# Patient Record
Sex: FEMALE | Race: OTHER | NOT HISPANIC OR LATINO
[De-identification: names, ages, dates, MRNs, and addresses within clinical notes are randomized per-mention and may not be internally consistent; named-entity substitution may affect disease eponyms.]

---

## 2017-01-03 ENCOUNTER — APPOINTMENT (OUTPATIENT)
Dept: INTERNAL MEDICINE | Facility: CLINIC | Age: 37
End: 2017-01-03

## 2017-01-03 LAB
INR PPP: 3.1 RATIO
POCT-PROTHROMBIN TIME: 37.7 SECS
QUALITY CONTROL: YES

## 2017-01-13 ENCOUNTER — OTHER (OUTPATIENT)
Age: 37
End: 2017-01-13

## 2017-01-17 ENCOUNTER — APPOINTMENT (OUTPATIENT)
Dept: INTERNAL MEDICINE | Facility: CLINIC | Age: 37
End: 2017-01-17

## 2017-01-17 LAB
INR PPP: 2.4 RATIO
POCT-PROTHROMBIN TIME: 28.3 SECS
QUALITY CONTROL: YES

## 2017-01-26 ENCOUNTER — LABORATORY RESULT (OUTPATIENT)
Age: 37
End: 2017-01-26

## 2017-01-26 ENCOUNTER — APPOINTMENT (OUTPATIENT)
Dept: PULMONOLOGY | Facility: CLINIC | Age: 37
End: 2017-01-26

## 2017-01-26 VITALS
TEMPERATURE: 97.9 F | OXYGEN SATURATION: 94 % | SYSTOLIC BLOOD PRESSURE: 118 MMHG | RESPIRATION RATE: 16 BRPM | HEIGHT: 67 IN | DIASTOLIC BLOOD PRESSURE: 80 MMHG | WEIGHT: 224 LBS | BODY MASS INDEX: 35.16 KG/M2 | HEART RATE: 131 BPM

## 2017-01-27 LAB
ALBUMIN SERPL ELPH-MCNC: 4.3 G/DL
ALP BLD-CCNC: 56 U/L
ALT SERPL-CCNC: 9 U/L
ANION GAP SERPL CALC-SCNC: 14 MMOL/L
AST SERPL-CCNC: 15 U/L
BASOPHILS # BLD AUTO: 0.08 K/UL
BASOPHILS NFR BLD AUTO: 0.9 %
BILIRUB SERPL-MCNC: 0.3 MG/DL
BUN SERPL-MCNC: 13 MG/DL
CALCIUM SERPL-MCNC: 9.2 MG/DL
CHLORIDE SERPL-SCNC: 103 MMOL/L
CO2 SERPL-SCNC: 22 MMOL/L
CREAT SERPL-MCNC: 1.04 MG/DL
EOSINOPHIL # BLD AUTO: 0.21 K/UL
EOSINOPHIL NFR BLD AUTO: 2.2 %
GLUCOSE SERPL-MCNC: 103 MG/DL
HCG SERPL-MCNC: <1 MIU/ML
HCT VFR BLD CALC: 36.3 %
HGB BLD-MCNC: 11.2 G/DL
IMM GRANULOCYTES NFR BLD AUTO: 1.1 %
LYMPHOCYTES # BLD AUTO: 1.91 K/UL
LYMPHOCYTES NFR BLD AUTO: 20.3 %
MAN DIFF?: NORMAL
MCHC RBC-ENTMCNC: 19 PG
MCHC RBC-ENTMCNC: 30.9 GM/DL
MCV RBC AUTO: 61.6 FL
MONOCYTES # BLD AUTO: 0.62 K/UL
MONOCYTES NFR BLD AUTO: 6.6 %
NEUTROPHILS # BLD AUTO: 6.48 K/UL
NEUTROPHILS NFR BLD AUTO: 68.9 %
PLATELET # BLD AUTO: 375 K/UL
POTASSIUM SERPL-SCNC: 3.9 MMOL/L
PROT SERPL-MCNC: 7.4 G/DL
RBC # BLD: 5.89 M/UL
RBC # FLD: 19.5 %
SODIUM SERPL-SCNC: 139 MMOL/L
TSH SERPL-ACNC: 3.5 UIU/ML
WBC # FLD AUTO: 9.4 K/UL

## 2017-02-28 ENCOUNTER — APPOINTMENT (OUTPATIENT)
Dept: INTERNAL MEDICINE | Facility: CLINIC | Age: 37
End: 2017-02-28

## 2017-02-28 LAB
INR PPP: 2.7 RATIO
POCT-PROTHROMBIN TIME: 32.4 SECS
QUALITY CONTROL: YES

## 2017-03-02 ENCOUNTER — MEDICATION RENEWAL (OUTPATIENT)
Age: 37
End: 2017-03-02

## 2017-03-03 ENCOUNTER — MEDICATION RENEWAL (OUTPATIENT)
Age: 37
End: 2017-03-03

## 2017-03-08 ENCOUNTER — MEDICATION RENEWAL (OUTPATIENT)
Age: 37
End: 2017-03-08

## 2017-03-08 ENCOUNTER — CHART COPY (OUTPATIENT)
Age: 37
End: 2017-03-08

## 2017-03-16 ENCOUNTER — APPOINTMENT (OUTPATIENT)
Dept: INTERNAL MEDICINE | Facility: CLINIC | Age: 37
End: 2017-03-16

## 2017-03-16 LAB
INR PPP: 2.4 RATIO
POCT-PROTHROMBIN TIME: 28.6 SECS
QUALITY CONTROL: YES

## 2017-03-31 ENCOUNTER — MEDICATION RENEWAL (OUTPATIENT)
Age: 37
End: 2017-03-31

## 2017-04-18 ENCOUNTER — APPOINTMENT (OUTPATIENT)
Dept: INTERNAL MEDICINE | Facility: CLINIC | Age: 37
End: 2017-04-18

## 2017-04-18 LAB
INR PPP: 2.4 RATIO
POCT-PROTHROMBIN TIME: 28.9 SECS
QUALITY CONTROL: YES

## 2017-05-01 ENCOUNTER — APPOINTMENT (OUTPATIENT)
Dept: CARDIOLOGY | Facility: CLINIC | Age: 37
End: 2017-05-01

## 2017-05-23 ENCOUNTER — APPOINTMENT (OUTPATIENT)
Dept: INTERNAL MEDICINE | Facility: CLINIC | Age: 37
End: 2017-05-23

## 2017-05-23 LAB
INR PPP: 2.6 RATIO
POCT-PROTHROMBIN TIME: 31.2 SECS
QUALITY CONTROL: YES

## 2017-06-20 ENCOUNTER — APPOINTMENT (OUTPATIENT)
Dept: INTERNAL MEDICINE | Facility: CLINIC | Age: 37
End: 2017-06-20

## 2017-06-20 LAB
INR PPP: 2 RATIO
POCT-PROTHROMBIN TIME: 24.2 SECS
QUALITY CONTROL: YES

## 2017-07-18 ENCOUNTER — APPOINTMENT (OUTPATIENT)
Dept: INTERNAL MEDICINE | Facility: CLINIC | Age: 37
End: 2017-07-18

## 2017-07-18 VITALS — RESPIRATION RATE: 20 BRPM | OXYGEN SATURATION: 98 % | HEART RATE: 108 BPM

## 2017-07-18 LAB
INR PPP: 1.9 RATIO
POCT-PROTHROMBIN TIME: 23.3 SECS
QUALITY CONTROL: YES

## 2017-08-14 ENCOUNTER — RX RENEWAL (OUTPATIENT)
Age: 37
End: 2017-08-14

## 2017-08-22 ENCOUNTER — APPOINTMENT (OUTPATIENT)
Dept: INTERNAL MEDICINE | Facility: CLINIC | Age: 37
End: 2017-08-22
Payer: MEDICAID

## 2017-08-22 VITALS — RESPIRATION RATE: 19 BRPM | HEART RATE: 100 BPM | OXYGEN SATURATION: 99 %

## 2017-08-22 LAB
INR PPP: 3.1 RATIO
POCT-PROTHROMBIN TIME: 36.8 SECS
QUALITY CONTROL: YES

## 2017-08-22 PROCEDURE — 99211 OFF/OP EST MAY X REQ PHY/QHP: CPT | Mod: 25

## 2017-08-22 PROCEDURE — 85610 PROTHROMBIN TIME: CPT | Mod: QW

## 2017-09-19 ENCOUNTER — APPOINTMENT (OUTPATIENT)
Dept: INTERNAL MEDICINE | Facility: CLINIC | Age: 37
End: 2017-09-19
Payer: MEDICAID

## 2017-09-19 VITALS — HEART RATE: 108 BPM | RESPIRATION RATE: 19 BRPM | OXYGEN SATURATION: 97 %

## 2017-09-19 LAB
INR PPP: 1.9 RATIO
POCT-PROTHROMBIN TIME: 22.8 SECS
QUALITY CONTROL: YES

## 2017-09-19 PROCEDURE — 85610 PROTHROMBIN TIME: CPT | Mod: QW

## 2017-09-19 PROCEDURE — 99211 OFF/OP EST MAY X REQ PHY/QHP: CPT | Mod: 25

## 2017-10-03 ENCOUNTER — APPOINTMENT (OUTPATIENT)
Dept: INTERNAL MEDICINE | Facility: CLINIC | Age: 37
End: 2017-10-03
Payer: MEDICAID

## 2017-10-03 LAB
INR PPP: 3.2 RATIO
POCT-PROTHROMBIN TIME: 38.1 SECS
QUALITY CONTROL: YES

## 2017-10-03 PROCEDURE — 99211 OFF/OP EST MAY X REQ PHY/QHP: CPT | Mod: 25

## 2017-10-03 PROCEDURE — 85610 PROTHROMBIN TIME: CPT | Mod: QW

## 2017-10-17 ENCOUNTER — APPOINTMENT (OUTPATIENT)
Dept: INTERNAL MEDICINE | Facility: CLINIC | Age: 37
End: 2017-10-17
Payer: MEDICAID

## 2017-10-17 VITALS — OXYGEN SATURATION: 98 % | RESPIRATION RATE: 19 BRPM | HEART RATE: 106 BPM

## 2017-10-17 LAB
INR PPP: 2.9 RATIO
POCT-PROTHROMBIN TIME: 34.3 SECS
QUALITY CONTROL: YES

## 2017-10-17 PROCEDURE — 85610 PROTHROMBIN TIME: CPT | Mod: QW

## 2017-10-17 PROCEDURE — 99211 OFF/OP EST MAY X REQ PHY/QHP: CPT | Mod: 25

## 2017-10-26 ENCOUNTER — APPOINTMENT (OUTPATIENT)
Dept: PULMONOLOGY | Facility: CLINIC | Age: 37
End: 2017-10-26

## 2017-11-03 ENCOUNTER — MEDICATION RENEWAL (OUTPATIENT)
Age: 37
End: 2017-11-03

## 2017-11-07 ENCOUNTER — APPOINTMENT (OUTPATIENT)
Dept: PULMONOLOGY | Facility: CLINIC | Age: 37
End: 2017-11-07

## 2017-11-08 ENCOUNTER — CHART COPY (OUTPATIENT)
Age: 37
End: 2017-11-08

## 2017-11-14 ENCOUNTER — APPOINTMENT (OUTPATIENT)
Dept: INTERNAL MEDICINE | Facility: CLINIC | Age: 37
End: 2017-11-14
Payer: MEDICAID

## 2017-11-14 ENCOUNTER — APPOINTMENT (OUTPATIENT)
Dept: PULMONOLOGY | Facility: CLINIC | Age: 37
End: 2017-11-14

## 2017-11-14 ENCOUNTER — RESULT CHARGE (OUTPATIENT)
Age: 37
End: 2017-11-14

## 2017-11-14 VITALS — HEART RATE: 116 BPM | OXYGEN SATURATION: 99 % | RESPIRATION RATE: 19 BRPM

## 2017-11-14 LAB
INR PPP: 2.4 RATIO
POCT-PROTHROMBIN TIME: 28.9 SECS
QUALITY CONTROL: YES

## 2017-11-14 PROCEDURE — 99211 OFF/OP EST MAY X REQ PHY/QHP: CPT | Mod: 25

## 2017-11-14 PROCEDURE — 85610 PROTHROMBIN TIME: CPT | Mod: QW

## 2017-11-21 ENCOUNTER — APPOINTMENT (OUTPATIENT)
Dept: PULMONOLOGY | Facility: CLINIC | Age: 37
End: 2017-11-21

## 2017-11-28 ENCOUNTER — RX RENEWAL (OUTPATIENT)
Age: 37
End: 2017-11-28

## 2017-11-28 ENCOUNTER — CHART COPY (OUTPATIENT)
Age: 37
End: 2017-11-28

## 2017-12-13 ENCOUNTER — RESULT CHARGE (OUTPATIENT)
Age: 37
End: 2017-12-13

## 2017-12-13 ENCOUNTER — APPOINTMENT (OUTPATIENT)
Dept: INTERNAL MEDICINE | Facility: CLINIC | Age: 37
End: 2017-12-13
Payer: MEDICAID

## 2017-12-13 VITALS — OXYGEN SATURATION: 99 % | RESPIRATION RATE: 19 BRPM | HEART RATE: 103 BPM

## 2017-12-13 LAB
INR PPP: 3.3 RATIO
POCT-PROTHROMBIN TIME: 39.7 SECS
QUALITY CONTROL: YES

## 2017-12-13 PROCEDURE — 99211 OFF/OP EST MAY X REQ PHY/QHP: CPT | Mod: 25

## 2017-12-13 PROCEDURE — 85610 PROTHROMBIN TIME: CPT | Mod: QW

## 2017-12-14 ENCOUNTER — LABORATORY RESULT (OUTPATIENT)
Age: 37
End: 2017-12-14

## 2017-12-14 ENCOUNTER — APPOINTMENT (OUTPATIENT)
Dept: PULMONOLOGY | Facility: CLINIC | Age: 37
End: 2017-12-14
Payer: MEDICAID

## 2017-12-14 VITALS
HEIGHT: 67 IN | SYSTOLIC BLOOD PRESSURE: 128 MMHG | WEIGHT: 210 LBS | RESPIRATION RATE: 15 BRPM | HEART RATE: 105 BPM | DIASTOLIC BLOOD PRESSURE: 70 MMHG | TEMPERATURE: 97.8 F | OXYGEN SATURATION: 95 % | BODY MASS INDEX: 32.96 KG/M2

## 2017-12-14 PROCEDURE — 36415 COLL VENOUS BLD VENIPUNCTURE: CPT

## 2017-12-14 PROCEDURE — 99215 OFFICE O/P EST HI 40 MIN: CPT | Mod: 25

## 2017-12-14 RX ORDER — AZITHROMYCIN 250 MG/1
250 TABLET, FILM COATED ORAL
Qty: 6 | Refills: 0 | Status: DISCONTINUED | COMMUNITY
Start: 2017-01-26 | End: 2017-12-14

## 2017-12-15 LAB
25(OH)D3 SERPL-MCNC: 9.1 NG/ML
ALBUMIN SERPL ELPH-MCNC: 4 G/DL
ALP BLD-CCNC: 47 U/L
ALT SERPL-CCNC: 6 U/L
ANION GAP SERPL CALC-SCNC: 12 MMOL/L
AST SERPL-CCNC: 15 U/L
BASOPHILS # BLD AUTO: 0.06 K/UL
BASOPHILS NFR BLD AUTO: 0.7 %
BILIRUB SERPL-MCNC: 0.4 MG/DL
BUN SERPL-MCNC: 8 MG/DL
CALCIUM SERPL-MCNC: 8.7 MG/DL
CHLORIDE SERPL-SCNC: 103 MMOL/L
CO2 SERPL-SCNC: 24 MMOL/L
CREAT SERPL-MCNC: 0.91 MG/DL
EOSINOPHIL # BLD AUTO: 0.17 K/UL
EOSINOPHIL NFR BLD AUTO: 1.8 %
GLUCOSE SERPL-MCNC: 86 MG/DL
HBA1C MFR BLD HPLC: 5.5 %
HCT VFR BLD CALC: 35.4 %
HGB BLD-MCNC: 10.7 G/DL
IMM GRANULOCYTES NFR BLD AUTO: 0.4 %
LYMPHOCYTES # BLD AUTO: 3.21 K/UL
LYMPHOCYTES NFR BLD AUTO: 34.9 %
MAN DIFF?: NORMAL
MCHC RBC-ENTMCNC: 19.1 PG
MCHC RBC-ENTMCNC: 30.2 GM/DL
MCV RBC AUTO: 63.2 FL
MONOCYTES # BLD AUTO: 0.71 K/UL
MONOCYTES NFR BLD AUTO: 7.7 %
NEUTROPHILS # BLD AUTO: 5.01 K/UL
NEUTROPHILS NFR BLD AUTO: 54.5 %
NT-PROBNP SERPL-MCNC: 95 PG/ML
PLATELET # BLD AUTO: 351 K/UL
POTASSIUM SERPL-SCNC: 4 MMOL/L
PROT SERPL-MCNC: 7.3 G/DL
RBC # BLD: 5.6 M/UL
RBC # FLD: 19.2 %
SODIUM SERPL-SCNC: 139 MMOL/L
TSH SERPL-ACNC: 1.77 UIU/ML
WBC # FLD AUTO: 9.2 K/UL

## 2017-12-18 LAB — TOTAL IGE SMQN RAST: 43 KU/L

## 2017-12-19 ENCOUNTER — RX RENEWAL (OUTPATIENT)
Age: 37
End: 2017-12-19

## 2017-12-20 ENCOUNTER — RESULT CHARGE (OUTPATIENT)
Age: 37
End: 2017-12-20

## 2017-12-20 ENCOUNTER — APPOINTMENT (OUTPATIENT)
Dept: INTERNAL MEDICINE | Facility: CLINIC | Age: 37
End: 2017-12-20
Payer: MEDICAID

## 2017-12-20 ENCOUNTER — RESULT REVIEW (OUTPATIENT)
Age: 37
End: 2017-12-20

## 2017-12-20 VITALS — HEART RATE: 110 BPM | OXYGEN SATURATION: 99 % | RESPIRATION RATE: 16 BRPM

## 2017-12-20 LAB
INR PPP: 2.8 RATIO
POCT-PROTHROMBIN TIME: 33.8 SECS

## 2017-12-20 PROCEDURE — 99211 OFF/OP EST MAY X REQ PHY/QHP: CPT | Mod: 25

## 2017-12-20 PROCEDURE — 85610 PROTHROMBIN TIME: CPT | Mod: QW

## 2018-01-17 ENCOUNTER — APPOINTMENT (OUTPATIENT)
Dept: INTERNAL MEDICINE | Facility: CLINIC | Age: 38
End: 2018-01-17
Payer: MEDICAID

## 2018-01-17 VITALS — RESPIRATION RATE: 18 BRPM | HEART RATE: 112 BPM | OXYGEN SATURATION: 99 %

## 2018-01-17 LAB
INR PPP: 2.5 RATIO
POCT-PROTHROMBIN TIME: 29.7 SECS
QUALITY CONTROL: YES

## 2018-01-17 PROCEDURE — 85610 PROTHROMBIN TIME: CPT | Mod: QW

## 2018-01-17 PROCEDURE — 99211 OFF/OP EST MAY X REQ PHY/QHP: CPT | Mod: 25

## 2018-01-31 ENCOUNTER — APPOINTMENT (OUTPATIENT)
Dept: INTERNAL MEDICINE | Facility: CLINIC | Age: 38
End: 2018-01-31
Payer: MEDICAID

## 2018-01-31 VITALS — HEART RATE: 111 BPM | OXYGEN SATURATION: 100 % | RESPIRATION RATE: 18 BRPM

## 2018-01-31 LAB
INR PPP: 2 RATIO
POCT-PROTHROMBIN TIME: 23.7 SECS
QUALITY CONTROL: YES

## 2018-01-31 PROCEDURE — 85610 PROTHROMBIN TIME: CPT | Mod: QW

## 2018-01-31 PROCEDURE — 99211 OFF/OP EST MAY X REQ PHY/QHP: CPT | Mod: 25

## 2018-02-14 ENCOUNTER — APPOINTMENT (OUTPATIENT)
Dept: INTERNAL MEDICINE | Facility: CLINIC | Age: 38
End: 2018-02-14
Payer: MEDICAID

## 2018-02-14 VITALS — RESPIRATION RATE: 18 BRPM | HEART RATE: 100 BPM | OXYGEN SATURATION: 99 %

## 2018-02-14 LAB
INR PPP: 1.9 RATIO
POCT-PROTHROMBIN TIME: 22.3 SECS
QUALITY CONTROL: YES

## 2018-02-14 PROCEDURE — 99211 OFF/OP EST MAY X REQ PHY/QHP: CPT | Mod: 25

## 2018-02-14 PROCEDURE — 85610 PROTHROMBIN TIME: CPT | Mod: QW

## 2018-02-28 ENCOUNTER — APPOINTMENT (OUTPATIENT)
Dept: INTERNAL MEDICINE | Facility: CLINIC | Age: 38
End: 2018-02-28
Payer: MEDICAID

## 2018-02-28 VITALS — HEART RATE: 102 BPM | RESPIRATION RATE: 19 BRPM | OXYGEN SATURATION: 99 %

## 2018-02-28 LAB
INR PPP: 2.2 RATIO
POCT-PROTHROMBIN TIME: 26.7 SECS

## 2018-02-28 PROCEDURE — 85610 PROTHROMBIN TIME: CPT | Mod: QW

## 2018-02-28 PROCEDURE — 99211 OFF/OP EST MAY X REQ PHY/QHP: CPT | Mod: 25

## 2018-03-14 ENCOUNTER — MEDICATION RENEWAL (OUTPATIENT)
Age: 38
End: 2018-03-14

## 2018-03-14 ENCOUNTER — APPOINTMENT (OUTPATIENT)
Dept: INTERNAL MEDICINE | Facility: CLINIC | Age: 38
End: 2018-03-14
Payer: MEDICAID

## 2018-03-14 VITALS — RESPIRATION RATE: 17 BRPM | HEART RATE: 116 BPM | OXYGEN SATURATION: 95 %

## 2018-03-14 LAB
INR PPP: 2.1 RATIO
POCT-PROTHROMBIN TIME: 25.5 SECS
QUALITY CONTROL: YES

## 2018-03-14 PROCEDURE — 99211 OFF/OP EST MAY X REQ PHY/QHP: CPT | Mod: 25

## 2018-03-14 PROCEDURE — 85610 PROTHROMBIN TIME: CPT | Mod: QW

## 2018-03-20 ENCOUNTER — APPOINTMENT (OUTPATIENT)
Dept: PULMONOLOGY | Facility: CLINIC | Age: 38
End: 2018-03-20
Payer: MEDICAID

## 2018-03-20 ENCOUNTER — LABORATORY RESULT (OUTPATIENT)
Age: 38
End: 2018-03-20

## 2018-03-20 VITALS
DIASTOLIC BLOOD PRESSURE: 66 MMHG | HEART RATE: 67 BPM | HEIGHT: 67 IN | RESPIRATION RATE: 16 BRPM | TEMPERATURE: 99.4 F | SYSTOLIC BLOOD PRESSURE: 118 MMHG | BODY MASS INDEX: 33.12 KG/M2 | WEIGHT: 211 LBS

## 2018-03-20 PROCEDURE — 99215 OFFICE O/P EST HI 40 MIN: CPT | Mod: 25

## 2018-03-20 PROCEDURE — 36415 COLL VENOUS BLD VENIPUNCTURE: CPT

## 2018-03-21 LAB
BASOPHILS NFR BLD AUTO: 1.8 %
EOSINOPHIL NFR BLD AUTO: 2.6 %
FERRITIN SERPL-MCNC: 5 NG/ML
HCG SERPL-MCNC: <1 MIU/ML
HCT VFR BLD CALC: 37.5 %
HGB BLD-MCNC: 11.1 G/DL
IRON SATN MFR SERPL: 3 %
IRON SERPL-MCNC: 14 UG/DL
LYMPHOCYTES # BLD AUTO: 2.39 K/UL
LYMPHOCYTES NFR BLD AUTO: 24.6 %
MAN DIFF?: NORMAL
MCHC RBC-ENTMCNC: 19.3 PG
MCHC RBC-ENTMCNC: 29.6 GM/DL
MCV RBC AUTO: 65.2 FL
MONOCYTES NFR BLD AUTO: 5.3 %
NEUTROPHILS # BLD AUTO: 6.39 K/UL
NEUTROPHILS NFR BLD AUTO: 65.8 %
PLATELET # BLD AUTO: 349 K/UL
RBC # BLD: 5.75 M/UL
RBC # FLD: 19.1 %
TIBC SERPL-MCNC: 430 UG/DL
UIBC SERPL-MCNC: 416 UG/DL
WBC # FLD AUTO: 9.71 K/UL

## 2018-03-22 LAB — 25(OH)D3 SERPL-MCNC: 25.5 NG/ML

## 2018-03-27 ENCOUNTER — APPOINTMENT (OUTPATIENT)
Dept: INTERNAL MEDICINE | Facility: CLINIC | Age: 38
End: 2018-03-27
Payer: MEDICAID

## 2018-03-28 ENCOUNTER — APPOINTMENT (OUTPATIENT)
Dept: INTERNAL MEDICINE | Facility: CLINIC | Age: 38
End: 2018-03-28
Payer: MEDICAID

## 2018-03-28 VITALS — HEART RATE: 143 BPM | RESPIRATION RATE: 18 BRPM | OXYGEN SATURATION: 99 %

## 2018-03-28 LAB
INR PPP: 3.5 RATIO
POCT-PROTHROMBIN TIME: 41.5 SECS
QUALITY CONTROL: YES

## 2018-03-28 PROCEDURE — 85610 PROTHROMBIN TIME: CPT | Mod: QW

## 2018-03-28 PROCEDURE — 99211 OFF/OP EST MAY X REQ PHY/QHP: CPT | Mod: 25

## 2018-04-11 ENCOUNTER — APPOINTMENT (OUTPATIENT)
Dept: INTERNAL MEDICINE | Facility: CLINIC | Age: 38
End: 2018-04-11
Payer: MEDICAID

## 2018-04-11 VITALS — OXYGEN SATURATION: 99 % | HEART RATE: 110 BPM | RESPIRATION RATE: 18 BRPM

## 2018-04-11 LAB
INR PPP: 2.1 RATIO
POCT-PROTHROMBIN TIME: 24.9 SECS
QUALITY CONTROL: YES

## 2018-04-11 PROCEDURE — 85610 PROTHROMBIN TIME: CPT | Mod: QW

## 2018-04-11 PROCEDURE — 99211 OFF/OP EST MAY X REQ PHY/QHP: CPT | Mod: 25

## 2018-04-13 ENCOUNTER — APPOINTMENT (OUTPATIENT)
Dept: PULMONOLOGY | Facility: CLINIC | Age: 38
End: 2018-04-13

## 2018-04-17 ENCOUNTER — OTHER (OUTPATIENT)
Age: 38
End: 2018-04-17

## 2018-04-23 ENCOUNTER — NON-APPOINTMENT (OUTPATIENT)
Age: 38
End: 2018-04-23

## 2018-04-25 ENCOUNTER — APPOINTMENT (OUTPATIENT)
Dept: INTERNAL MEDICINE | Facility: CLINIC | Age: 38
End: 2018-04-25

## 2018-04-26 ENCOUNTER — NON-APPOINTMENT (OUTPATIENT)
Age: 38
End: 2018-04-26

## 2018-04-26 ENCOUNTER — APPOINTMENT (OUTPATIENT)
Dept: PULMONOLOGY | Facility: CLINIC | Age: 38
End: 2018-04-26
Payer: MEDICAID

## 2018-04-26 ENCOUNTER — LABORATORY RESULT (OUTPATIENT)
Age: 38
End: 2018-04-26

## 2018-04-26 ENCOUNTER — MED ADMIN CHARGE (OUTPATIENT)
Age: 38
End: 2018-04-26

## 2018-04-26 VITALS
DIASTOLIC BLOOD PRESSURE: 70 MMHG | HEART RATE: 151 BPM | BODY MASS INDEX: 33.27 KG/M2 | TEMPERATURE: 98.6 F | WEIGHT: 212 LBS | HEIGHT: 67 IN | OXYGEN SATURATION: 97 % | SYSTOLIC BLOOD PRESSURE: 118 MMHG | RESPIRATION RATE: 15 BRPM

## 2018-04-26 PROCEDURE — 36415 COLL VENOUS BLD VENIPUNCTURE: CPT

## 2018-04-26 PROCEDURE — 93000 ELECTROCARDIOGRAM COMPLETE: CPT

## 2018-04-26 PROCEDURE — 99215 OFFICE O/P EST HI 40 MIN: CPT | Mod: 25

## 2018-04-26 PROCEDURE — 96372 THER/PROPH/DIAG INJ SC/IM: CPT

## 2018-04-29 LAB
ALBUMIN SERPL ELPH-MCNC: 4.1 G/DL
ALP BLD-CCNC: 39 U/L
ALT SERPL-CCNC: 9 U/L
ANION GAP SERPL CALC-SCNC: 16 MMOL/L
AST SERPL-CCNC: 12 U/L
BASOPHILS # BLD AUTO: 0.16 K/UL
BASOPHILS NFR BLD AUTO: 1.8 %
BILIRUB SERPL-MCNC: 0.6 MG/DL
BUN SERPL-MCNC: 14 MG/DL
CALCIUM SERPL-MCNC: 9.2 MG/DL
CHLORIDE SERPL-SCNC: 105 MMOL/L
CO2 SERPL-SCNC: 19 MMOL/L
CREAT SERPL-MCNC: 1.15 MG/DL
EOSINOPHIL # BLD AUTO: 0.25 K/UL
EOSINOPHIL NFR BLD AUTO: 2.8 %
GLUCOSE SERPL-MCNC: 77 MG/DL
HCG SERPL-MCNC: <1 MIU/ML
HCT VFR BLD CALC: 36 %
HGB BLD-MCNC: 10.4 G/DL
LYMPHOCYTES # BLD AUTO: 2.5 K/UL
LYMPHOCYTES NFR BLD AUTO: 27.5 %
MAN DIFF?: NORMAL
MCHC RBC-ENTMCNC: 19.1 PG
MCHC RBC-ENTMCNC: 28.9 GM/DL
MCV RBC AUTO: 66.2 FL
MONOCYTES # BLD AUTO: 0.58 K/UL
MONOCYTES NFR BLD AUTO: 6.4 %
NEUTROPHILS # BLD AUTO: 5.33 K/UL
NEUTROPHILS NFR BLD AUTO: 58.7 %
NT-PROBNP SERPL-MCNC: 817 PG/ML
PLATELET # BLD AUTO: 404 K/UL
POTASSIUM SERPL-SCNC: 3.9 MMOL/L
PROT SERPL-MCNC: 7 G/DL
RBC # BLD: 5.44 M/UL
RBC # FLD: 19.4 %
SODIUM SERPL-SCNC: 140 MMOL/L
WBC # FLD AUTO: 9.08 K/UL

## 2018-04-30 ENCOUNTER — MEDICATION RENEWAL (OUTPATIENT)
Age: 38
End: 2018-04-30

## 2018-05-01 ENCOUNTER — INPATIENT (INPATIENT)
Facility: HOSPITAL | Age: 38
LOS: 20 days | Discharge: ANOTHER HOSPITAL | DRG: 287 | End: 2018-05-22
Attending: SPECIALIST | Admitting: HOSPITALIST
Payer: MEDICAID

## 2018-05-01 VITALS
HEIGHT: 66 IN | OXYGEN SATURATION: 99 % | DIASTOLIC BLOOD PRESSURE: 73 MMHG | WEIGHT: 210.1 LBS | HEART RATE: 106 BPM | RESPIRATION RATE: 20 BRPM | SYSTOLIC BLOOD PRESSURE: 138 MMHG | TEMPERATURE: 98 F

## 2018-05-01 DIAGNOSIS — Z98.51 TUBAL LIGATION STATUS: Chronic | ICD-10-CM

## 2018-05-01 DIAGNOSIS — R06.09 OTHER FORMS OF DYSPNEA: ICD-10-CM

## 2018-05-01 LAB
ALBUMIN SERPL ELPH-MCNC: 4.1 G/DL — SIGNIFICANT CHANGE UP (ref 3.3–5)
ALP SERPL-CCNC: 42 U/L — SIGNIFICANT CHANGE UP (ref 40–120)
ALT FLD-CCNC: 8 U/L — LOW (ref 10–45)
ANION GAP SERPL CALC-SCNC: 12 MMOL/L — SIGNIFICANT CHANGE UP (ref 5–17)
APTT BLD: 51.2 SEC — HIGH (ref 27.5–37.4)
AST SERPL-CCNC: 12 U/L — SIGNIFICANT CHANGE UP (ref 10–40)
BASOPHILS # BLD AUTO: 0.1 K/UL — SIGNIFICANT CHANGE UP (ref 0–0.2)
BASOPHILS NFR BLD AUTO: 0.6 % — SIGNIFICANT CHANGE UP (ref 0–2)
BILIRUB SERPL-MCNC: 0.7 MG/DL — SIGNIFICANT CHANGE UP (ref 0.2–1.2)
BUN SERPL-MCNC: 12 MG/DL — SIGNIFICANT CHANGE UP (ref 7–23)
CALCIUM SERPL-MCNC: 9 MG/DL — SIGNIFICANT CHANGE UP (ref 8.4–10.5)
CHLORIDE SERPL-SCNC: 104 MMOL/L — SIGNIFICANT CHANGE UP (ref 96–108)
CK MB CFR SERPL CALC: 1 NG/ML — SIGNIFICANT CHANGE UP (ref 0–3.8)
CK SERPL-CCNC: 38 U/L — SIGNIFICANT CHANGE UP (ref 25–170)
CO2 SERPL-SCNC: 23 MMOL/L — SIGNIFICANT CHANGE UP (ref 22–31)
CREAT SERPL-MCNC: 1.12 MG/DL — SIGNIFICANT CHANGE UP (ref 0.5–1.3)
EOSINOPHIL # BLD AUTO: 0.2 K/UL — SIGNIFICANT CHANGE UP (ref 0–0.5)
EOSINOPHIL NFR BLD AUTO: 1.4 % — SIGNIFICANT CHANGE UP (ref 0–6)
GAS PNL BLDV: SIGNIFICANT CHANGE UP
GLUCOSE SERPL-MCNC: 95 MG/DL — SIGNIFICANT CHANGE UP (ref 70–99)
HCT VFR BLD CALC: 34.8 % — SIGNIFICANT CHANGE UP (ref 34.5–45)
HGB BLD-MCNC: 11 G/DL — LOW (ref 11.5–15.5)
INR BLD: 2.13 RATIO — HIGH (ref 0.88–1.16)
LIDOCAIN IGE QN: 27 U/L — SIGNIFICANT CHANGE UP (ref 7–60)
LYMPHOCYTES # BLD AUTO: 2.8 K/UL — SIGNIFICANT CHANGE UP (ref 1–3.3)
LYMPHOCYTES # BLD AUTO: 23.1 % — SIGNIFICANT CHANGE UP (ref 13–44)
MCHC RBC-ENTMCNC: 20.5 PG — LOW (ref 27–34)
MCHC RBC-ENTMCNC: 31.6 GM/DL — LOW (ref 32–36)
MCV RBC AUTO: 64.7 FL — LOW (ref 80–100)
MONOCYTES # BLD AUTO: 0.7 K/UL — SIGNIFICANT CHANGE UP (ref 0–0.9)
MONOCYTES NFR BLD AUTO: 6.1 % — SIGNIFICANT CHANGE UP (ref 2–14)
NEUTROPHILS # BLD AUTO: 8.4 K/UL — HIGH (ref 1.8–7.4)
NEUTROPHILS NFR BLD AUTO: 68.8 % — SIGNIFICANT CHANGE UP (ref 43–77)
PLATELET # BLD AUTO: 385 K/UL — SIGNIFICANT CHANGE UP (ref 150–400)
POTASSIUM SERPL-MCNC: 3.7 MMOL/L — SIGNIFICANT CHANGE UP (ref 3.5–5.3)
POTASSIUM SERPL-SCNC: 3.7 MMOL/L — SIGNIFICANT CHANGE UP (ref 3.5–5.3)
PROT SERPL-MCNC: 7.1 G/DL — SIGNIFICANT CHANGE UP (ref 6–8.3)
PROTHROM AB SERPL-ACNC: 23.4 SEC — HIGH (ref 9.8–12.7)
RBC # BLD: 5.37 M/UL — HIGH (ref 3.8–5.2)
RBC # FLD: 17.7 % — HIGH (ref 10.3–14.5)
SODIUM SERPL-SCNC: 139 MMOL/L — SIGNIFICANT CHANGE UP (ref 135–145)
TROPONIN T SERPL-MCNC: <0.01 NG/ML — SIGNIFICANT CHANGE UP (ref 0–0.06)
WBC # BLD: 12.3 K/UL — HIGH (ref 3.8–10.5)
WBC # FLD AUTO: 12.3 K/UL — HIGH (ref 3.8–10.5)

## 2018-05-01 PROCEDURE — 71046 X-RAY EXAM CHEST 2 VIEWS: CPT | Mod: 26

## 2018-05-01 PROCEDURE — 99285 EMERGENCY DEPT VISIT HI MDM: CPT | Mod: 25

## 2018-05-01 PROCEDURE — 93010 ELECTROCARDIOGRAM REPORT: CPT

## 2018-05-01 RX ORDER — NIFEDIPINE 30 MG
60 TABLET, EXTENDED RELEASE 24 HR ORAL
Qty: 0 | Refills: 0 | Status: DISCONTINUED | OUTPATIENT
Start: 2018-05-01 | End: 2018-05-02

## 2018-05-01 RX ORDER — RIVAROXABAN 15 MG-20MG
15 KIT ORAL
Qty: 0 | Refills: 0 | Status: DISCONTINUED | OUTPATIENT
Start: 2018-05-01 | End: 2018-05-01

## 2018-05-01 RX ORDER — RIVAROXABAN 15 MG-20MG
15 KIT ORAL EVERY 12 HOURS
Qty: 0 | Refills: 0 | Status: DISCONTINUED | OUTPATIENT
Start: 2018-05-01 | End: 2018-05-10

## 2018-05-01 RX ORDER — IPRATROPIUM BROMIDE 0.2 MG/ML
500 SOLUTION, NON-ORAL INHALATION ONCE
Qty: 0 | Refills: 0 | Status: DISCONTINUED | OUTPATIENT
Start: 2018-05-01 | End: 2018-05-02

## 2018-05-01 RX ORDER — PANTOPRAZOLE SODIUM 20 MG/1
40 TABLET, DELAYED RELEASE ORAL
Qty: 0 | Refills: 0 | Status: DISCONTINUED | OUTPATIENT
Start: 2018-05-01 | End: 2018-05-13

## 2018-05-01 RX ORDER — IPRATROPIUM BROMIDE 0.2 MG/ML
500 SOLUTION, NON-ORAL INHALATION ONCE
Qty: 0 | Refills: 0 | Status: COMPLETED | OUTPATIENT
Start: 2018-05-01 | End: 2018-05-01

## 2018-05-01 RX ORDER — SPIRONOLACTONE 25 MG/1
25 TABLET, FILM COATED ORAL DAILY
Qty: 0 | Refills: 0 | Status: DISCONTINUED | OUTPATIENT
Start: 2018-05-01 | End: 2018-05-13

## 2018-05-01 RX ADMIN — Medication 500 MICROGRAM(S): at 23:20

## 2018-05-01 NOTE — ED PROVIDER NOTE - PROGRESS NOTE DETAILS
ED attending Dr. Sánchez discussed case with outpatient pulm physician Dr. Vivian Yoon who recommended pt to be admitted to hospitalist for further work up and consult pulm team who is aware pt is in the hospital.

## 2018-05-01 NOTE — ED PROVIDER NOTE - ATTENDING CONTRIBUTION TO CARE
38 y/o female with PMHx of primary pulmonary HTN, GERD,  asthma on home O2 2L at bedtime, chronic PE on Xarelto, anemia presents for continued shortness of breath. per patient she has been evaluated by Dr. Yoon as outpt and referred to ER because of consistent SOB. Patient was admitted to [facility] in [place] for 3 days after syncopal event and workup was overall negative on April 18th.  While in the hospital was diagnosed with PNA and discharged home.  no chest pain no heart palp. here with  at the bedside.   Gen.  no acute distress  HEENT:  PERRL   Lungs:  b/l bs. no retractions  CVS: S1S2   Abd;  soft non tend  Ext: no edema. no calf tend.  Neuro: aaox3 no focal deficits.   MSK: 5/5 x 4

## 2018-05-01 NOTE — ED ADULT NURSE NOTE - OBJECTIVE STATEMENT
37 year old female a/ox3 ambulatory presenting to ed with worsening diff breathing over the last week. per patient, she had syncopal episode last week 37 year old female a/ox3 ambulatory presenting to ed with worsening diff breathing over the last week. per patient, she had syncopal episode last week and was admitted into [facility]. patient a poor historian, unsure if she had "a clogged heart or PNA" seen by md last thursday who told her to come to hospital this week to be admitted "for more tests" unsure of which tests. patient verbalizes feel more out of breath with exertion, not at rest. and intermittent cough (unable to describe cough) no fever/chills, long travel, no chest pain. respirations even unlabored no sob/dyspnea no change in bowel/bladder skin dry warm intact jones equally.

## 2018-05-01 NOTE — ED PROVIDER NOTE - MEDICAL DECISION MAKING DETAILS
ATTG: : sob, PE work up neg at other hospital. suspect worsening pulm HTN, will check echo, labs,and admit to hospital for frurther care. denny hernandez and his team aware.

## 2018-05-01 NOTE — ED ADULT NURSE NOTE - PMH
Anemia  On home O2 at 2L via NC  Asthma    Asthma with status asthmaticus, unspecified asthma severity    PE (pulmonary thromboembolism)  on coumadin  Pulmonary embolism    Pulmonary hypertension    Pulmonary hypertension    Sinus tachycardia    Tachycardia

## 2018-05-02 DIAGNOSIS — R06.09 OTHER FORMS OF DYSPNEA: ICD-10-CM

## 2018-05-02 DIAGNOSIS — K21.9 GASTRO-ESOPHAGEAL REFLUX DISEASE WITHOUT ESOPHAGITIS: ICD-10-CM

## 2018-05-02 DIAGNOSIS — Z29.9 ENCOUNTER FOR PROPHYLACTIC MEASURES, UNSPECIFIED: ICD-10-CM

## 2018-05-02 DIAGNOSIS — R63.8 OTHER SYMPTOMS AND SIGNS CONCERNING FOOD AND FLUID INTAKE: ICD-10-CM

## 2018-05-02 DIAGNOSIS — F41.9 ANXIETY DISORDER, UNSPECIFIED: ICD-10-CM

## 2018-05-02 DIAGNOSIS — I27.20 PULMONARY HYPERTENSION, UNSPECIFIED: ICD-10-CM

## 2018-05-02 DIAGNOSIS — D64.9 ANEMIA, UNSPECIFIED: ICD-10-CM

## 2018-05-02 DIAGNOSIS — J45.909 UNSPECIFIED ASTHMA, UNCOMPLICATED: ICD-10-CM

## 2018-05-02 DIAGNOSIS — R00.0 TACHYCARDIA, UNSPECIFIED: ICD-10-CM

## 2018-05-02 LAB
ALBUMIN SERPL ELPH-MCNC: 4 G/DL — SIGNIFICANT CHANGE UP (ref 3.3–5)
ALP SERPL-CCNC: 42 U/L — SIGNIFICANT CHANGE UP (ref 40–120)
ALT FLD-CCNC: 9 U/L — LOW (ref 10–45)
ANION GAP SERPL CALC-SCNC: 13 MMOL/L — SIGNIFICANT CHANGE UP (ref 5–17)
APTT BLD: 42.6 SEC — HIGH (ref 27.5–37.4)
AST SERPL-CCNC: 10 U/L — SIGNIFICANT CHANGE UP (ref 10–40)
BILIRUB SERPL-MCNC: 0.9 MG/DL — SIGNIFICANT CHANGE UP (ref 0.2–1.2)
BUN SERPL-MCNC: 10 MG/DL — SIGNIFICANT CHANGE UP (ref 7–23)
CALCIUM SERPL-MCNC: 9.1 MG/DL — SIGNIFICANT CHANGE UP (ref 8.4–10.5)
CHLORIDE SERPL-SCNC: 103 MMOL/L — SIGNIFICANT CHANGE UP (ref 96–108)
CO2 SERPL-SCNC: 22 MMOL/L — SIGNIFICANT CHANGE UP (ref 22–31)
CREAT SERPL-MCNC: 1.05 MG/DL — SIGNIFICANT CHANGE UP (ref 0.5–1.3)
GLUCOSE SERPL-MCNC: 109 MG/DL — HIGH (ref 70–99)
HCT VFR BLD CALC: 34.8 % — SIGNIFICANT CHANGE UP (ref 34.5–45)
HGB BLD-MCNC: 10.8 G/DL — LOW (ref 11.5–15.5)
INR BLD: 1.49 RATIO — HIGH (ref 0.88–1.16)
MAGNESIUM SERPL-MCNC: 1.9 MG/DL — SIGNIFICANT CHANGE UP (ref 1.6–2.6)
MCHC RBC-ENTMCNC: 20 PG — LOW (ref 27–34)
MCHC RBC-ENTMCNC: 31 GM/DL — LOW (ref 32–36)
MCV RBC AUTO: 64.6 FL — LOW (ref 80–100)
PHOSPHATE SERPL-MCNC: 3 MG/DL — SIGNIFICANT CHANGE UP (ref 2.5–4.5)
PLATELET # BLD AUTO: 391 K/UL — SIGNIFICANT CHANGE UP (ref 150–400)
POTASSIUM SERPL-MCNC: 4 MMOL/L — SIGNIFICANT CHANGE UP (ref 3.5–5.3)
POTASSIUM SERPL-SCNC: 4 MMOL/L — SIGNIFICANT CHANGE UP (ref 3.5–5.3)
PROT SERPL-MCNC: 6.9 G/DL — SIGNIFICANT CHANGE UP (ref 6–8.3)
PROTHROM AB SERPL-ACNC: 16.4 SEC — HIGH (ref 9.8–12.7)
RBC # BLD: 5.38 M/UL — HIGH (ref 3.8–5.2)
RBC # FLD: 18.2 % — HIGH (ref 10.3–14.5)
SODIUM SERPL-SCNC: 138 MMOL/L — SIGNIFICANT CHANGE UP (ref 135–145)
WBC # BLD: 10.5 K/UL — SIGNIFICANT CHANGE UP (ref 3.8–10.5)
WBC # FLD AUTO: 10.5 K/UL — SIGNIFICANT CHANGE UP (ref 3.8–10.5)

## 2018-05-02 PROCEDURE — 71275 CT ANGIOGRAPHY CHEST: CPT | Mod: 26

## 2018-05-02 PROCEDURE — 99223 1ST HOSP IP/OBS HIGH 75: CPT | Mod: AI,GC

## 2018-05-02 PROCEDURE — 99233 SBSQ HOSP IP/OBS HIGH 50: CPT | Mod: GC

## 2018-05-02 RX ORDER — NIFEDIPINE 30 MG
60 TABLET, EXTENDED RELEASE 24 HR ORAL
Qty: 0 | Refills: 0 | Status: DISCONTINUED | OUTPATIENT
Start: 2018-05-02 | End: 2018-05-07

## 2018-05-02 RX ORDER — IPRATROPIUM BROMIDE 0.2 MG/ML
500 SOLUTION, NON-ORAL INHALATION ONCE
Qty: 0 | Refills: 0 | Status: COMPLETED | OUTPATIENT
Start: 2018-05-02 | End: 2018-05-02

## 2018-05-02 RX ADMIN — Medication 100 MILLIGRAM(S): at 21:23

## 2018-05-02 RX ADMIN — SPIRONOLACTONE 25 MILLIGRAM(S): 25 TABLET, FILM COATED ORAL at 05:26

## 2018-05-02 RX ADMIN — Medication 10 MILLIGRAM(S): at 05:26

## 2018-05-02 RX ADMIN — Medication 100 MILLIGRAM(S): at 18:10

## 2018-05-02 RX ADMIN — Medication 1 TABLET(S): at 12:52

## 2018-05-02 RX ADMIN — Medication 60 MILLIGRAM(S): at 18:06

## 2018-05-02 RX ADMIN — RIVAROXABAN 15 MILLIGRAM(S): KIT at 18:06

## 2018-05-02 RX ADMIN — RIVAROXABAN 15 MILLIGRAM(S): KIT at 04:11

## 2018-05-02 RX ADMIN — Medication 500 MICROGRAM(S): at 11:06

## 2018-05-02 RX ADMIN — Medication 60 MILLIGRAM(S): at 05:26

## 2018-05-02 RX ADMIN — PANTOPRAZOLE SODIUM 40 MILLIGRAM(S): 20 TABLET, DELAYED RELEASE ORAL at 05:26

## 2018-05-02 NOTE — H&P ADULT - PROBLEM SELECTOR PLAN 1
Pt has known pulmonary HTN and chronic asthma; based on presentation and initial LOC in setting of Rx non-compliance re: pHTN, likely a result of pulmonary HTN which may be worsening; however, pt is coughing, hoarse, and having mild sputum production so infectious etiology should be r/o   - f/u pulmonary recs from Dr. Yoon  - Atrovent nebulizer PRN  - supplemental O2 PRN  - will touch base with Dr. Yoon regarding cardiology consult for RHC  - consider TTE as last TTE was in 2016  - consider chest CT  - consider RVP

## 2018-05-02 NOTE — H&P ADULT - PROBLEM SELECTOR PLAN 3
Pt has chronic asthma, recent increase in Atrovent nebulizer use as the weather is changing and she is having mild sputum production; not actively wheezing or in respiratory distress  - trend O2 sat  - supplemental O2 PRN  - Atrovent PRN

## 2018-05-02 NOTE — H&P ADULT - PROBLEM SELECTOR PLAN 2
Pt was dx'd in 2014 with pulmonary HTN w/ features of Class III and Class I as per a pulmonary angiogram done at [facility]  - management as per pulmonary team  - consider TTE/chest CT to evaluate disease progression  - consider cardiology consult for RHC to re-evaluate PA pressures  - c/w Nifedipine ER 60 mg BID  - c/w Adempas 0.5 TID

## 2018-05-02 NOTE — CONSULT NOTE ADULT - ASSESSMENT
36 y/o female with PMHx of pulm htn w/ features of WHO Group I and IV.    - Please obtain CTA  - Repeat ECHO  - Will need repeat RHC.   - Patient advised to bring adempas in, continue as inpatient.     Will follow

## 2018-05-02 NOTE — H&P ADULT - PROBLEM SELECTOR PLAN 7
May have underlying anxiety disorder which is contributing to her tachycardia and compounding the issue  - pt is amenable to a psychiatry consult ; will d/w pulmonary team

## 2018-05-02 NOTE — H&P ADULT - ATTENDING COMMENTS
37 you with pulmonary HTN class I and III admitted with worsening SOB/SOUZA, tachycardia and several syncopal episodes.  I reviewed CXR films (clear lungs, plump pulmonary arteries) and discuss case/care in detail with Dr Yoon.  --R heart cath, CTA to r/o recurrent PE  --cont out patient meds  --Xerlato for PE Hx (lifelong)

## 2018-05-02 NOTE — H&P ADULT - HISTORY OF PRESENT ILLNESS
38 y/o female with PMHx of pulm htn, GERD, asthma on home O2 2L at bedtime, chronic PE on xarelto, anemia sent in by pulmonologist Dr. Vivian Yoon to be admitted for further workup for worsening dyspnea on exertion. Patient stated she was recently admitted to [facility] in [place] for 3 days after syncopal event and workup was overall negative. Patient stated she has SOB at rest and worsens with ambulation. Has not used her inhaler more frequently.  Patient admits a nonproductive cough over the last few days but denied orthopnea or paroxysmal nocturnal dyspnea. Patient stated she has sharp intermittent epigastric pain as well but denied N/V/D, radiation of pain, or fever.    ROS otherwise negative.    In the ED:  VS: afebrile, HR , -138/71-73, RR 18-20, SaO2 96-99%  Received: ipratropium x 1, prednisone 10, protonix 40, Nifedipine 60 36 y/o female with PMHx of pulm htn w/ features of Class I/III, GERD, asthma on home O2 2L at bedtime, chronic PE on xarelto, iron deficiency anemia sent in by pulmonologist Dr. Vivian Yoon to be admitted for further workup for worsening dyspnea on exertion.     Pr states that 3 days ago, she was taking a shower, sat on the bed when she was done, felt like she was going to faint and passed out - this is the third time she has lost consciousness; no bowel/bladder incontinence/tongue biting/fasciculations, no post-ictal state, not related to change in position.  She went to [facility] in [facility]     Patient stated she was recently admitted to [facility] in [place] for 3 days after syncopal event and workup was overall negative. Patient stated she has SOB at rest and worsens with ambulation. Has not used her inhaler more frequently.  Patient admits a nonproductive cough over the last few days but denied orthopnea or paroxysmal nocturnal dyspnea. Patient stated she has sharp intermittent epigastric pain as well but denied N/V/D, radiation of pain, or fever.    ROS otherwise negative.    In the ED:  VS: afebrile, HR , -138/71-73, RR 18-20, SaO2 96-99%  Received: ipratropium x 1, prednisone 10, protonix 40, Nifedipine 60 36 y/o female with PMHx of pulm htn w/ features of Class I/III, GERD, asthma on home O2 2L at bedtime, chronic PE on xarelto, iron deficiency anemia sent in by pulmonologist Dr. Vivian Yoon to be admitted for further workup for worsening dyspnea on exertion.     Pr states that 3 days ago, she was taking a shower, sat on the bed when she was done, felt like she was going to faint, had palpitations, and passed out - this is the third time she has lost consciousness; no bowel/bladder incontinence/tongue biting/fasciculations, no post-ictal state, not related to change in position, but did vomit and stated that her whole body was shaking as per her .  She went to [facility] in [place] for evaluation where she states that her work up was negative; but after contacting Dr. Yoon, he advised her to come into the hospital for further work up.  Of note, pt had not been taking her Adempas and Nifedipine for multiple days, and this is really the only change in behavior or habit that she endorses.      Pt states that she has dyspnea at rest, but that it worsens with ambulation.  More importantly, she feels quite tachycardic when she walks and endorses significant anxiety about doing anything because she's afraid of her tachycardia, stating that she feels it's like a panic attack in some way, which exacerbates her underlying heart rate.      With respect to her asthma, she has a hx of chronic asthma with triggers known to be changes in weather, allergies, and any URI Sxs.  She does not wake up with an asthma attack, and her frequency of Atrovent inhaler use varies.  As of late, since she has had increased phlegm production and hoarseness, she is using her Atrovent nebulizer every night.      She endorses a cough as well over the last few days.  The cough is generally dry but occasionally productive with less than a teaspoon of clear mucus without blood or foul smell.  As per discussion, she denies PND and orthopnea; sleeps on 1-2 pillows at baseline.      Lastly, pt had some sharp midepigastric pain w/o radiation after receiving her medication in the ED, now abating.  No n/v/diarrhea/constipation at this time.      Last chest CT in 2016 showed enlarged main pulmonary artery w/ patchy bilateral ground glass opacities; last Echo in 2016 showed normal LV dimensions, hyperdynamic LV, flattening of IVS in systole/diastole c/w RV pressure overload, decreased RV systolic function w/ EF 82%.    ROS otherwise negative.    In the ED:  VS: afebrile, HR , -138/71-73, RR 18-20, SaO2 96-99%  Received: ipratropium x 1, prednisone 10, protonix 40, Nifedipine 60

## 2018-05-02 NOTE — H&P ADULT - PROBLEM SELECTOR PLAN 5
Regular diet Hx of iron deficiency anemia; no s/s of bleeding  - c/w ferrous sulfate 325  - trend H/H via CBC  - transfuse if < 7; low probability of bleeding

## 2018-05-02 NOTE — CONSULT NOTE ADULT - SUBJECTIVE AND OBJECTIVE BOX
HPI:    PAST MEDICAL & SURGICAL HISTORY:  PE (pulmonary thromboembolism): on coumadin  Asthma with status asthmaticus, unspecified asthma severity  Sinus tachycardia  Asthma  Pulmonary embolism  Pulmonary hypertension  Anemia: On home O2 at 2L via NC  Tachycardia  Pulmonary hypertension  No significant past surgical history  History of tubal ligation      FAMILY HISTORY:  No pertinent family history in first degree relatives  Family history of diabetes mellitus in mother  Family history of diabetes mellitus      SOCIAL HISTORY:  Smoking: __ packs x ___ years  EtOH Use:  Marital Status:  Occupation:  Exposures:  Recent Travel:    Allergies    No Known Allergies    Intolerances        HOME MEDICATIONS:    REVIEW OF SYSTEMS:  Constitutional: No fevers or chills. No weight loss. No fatigue or generalised malaise.  Eyes: No itching or discharge from the eyes  ENT: No ear pain. No ear discharge. No nasal congestion. No post nasal drip. No epistaxis. No throat pain. No sore throat. No difficulty swallowing.   CV: No chest pain. No palpitations. No lightheadedness or dizziness.   Resp: No dyspnea at rest. No dyspnea on exertion. No orthopnea. No wheezing. No cough. No stridor. No sputum production. No chest pain with respiration.  GI: No nausea. No vomiting. No diarrhea.  MSK: No joint pain or pain in any extremities  Integumentary: No skin lesions. No pedal edema.  Neurological: No gross motor weakness. No sensory changes.    [ ] All other systems negative  [ ] Unable to assess ROS because ________    OBJECTIVE:  ICU Vital Signs Last 24 Hrs  T(C): 37.1 (02 May 2018 05:24), Max: 37.1 (02 May 2018 05:24)  T(F): 98.7 (02 May 2018 05:24), Max: 98.7 (02 May 2018 05:24)  HR: 98 (02 May 2018 05:24) (80 - 106)  BP: 106/74 (02 May 2018 05:24) (100/71 - 138/73)  BP(mean): --  ABP: --  ABP(mean): --  RR: 18 (02 May 2018 05:24) (18 - 20)  SpO2: 99% (02 May 2018 05:24) (96% - 100%)        CAPILLARY BLOOD GLUCOSE          PHYSICAL EXAM:  General: Awake, alert, oriented X 3.   HEENT: Atraumatic, normocephalic.                 Mallampatti Grade                 No nasal congestion.                No tonsillar or pharyngeal exudates.  Lymph Nodes: No palpable lymphadenopathy  Neck: No JVD. No carotid bruit.   Respiratory: Normal chest expansion                         Normal percussion                         Normal and equal air entry                         No wheeze, rhonchi or rales.  Cardiovascular: S1 S2 normal. No murmurs, rubs or gallops.   Abdomen: Soft, non-tender, non-distended. No organomegaly.  Extremities: Warm to touch. Peripheral pulse palpable. No pedal edema.   Skin: No rashes or skin lesions  Neurological: Motor and sensory examination equal and normal in all four extremities.  Psychiatry: Appropriate mood and affect.    HOSPITAL MEDICATIONS:  MEDICATIONS  (STANDING):  multivitamin 1 Tablet(s) Oral daily  NIFEdipine XL 60 milliGRAM(s) Oral <User Schedule>  pantoprazole    Tablet 40 milliGRAM(s) Oral before breakfast  predniSONE   Tablet 10 milliGRAM(s) Oral daily  rivaroxaban 15 milliGRAM(s) Oral every 12 hours  spironolactone 25 milliGRAM(s) Oral daily    MEDICATIONS  (PRN):  ipratropium  for Nebulization. 500 MICROGram(s) Nebulizer once PRN SOB      LABS:                        11.0   12.3  )-----------( 385      ( 01 May 2018 15:13 )             34.8     05-01    139  |  104  |  12  ----------------------------<  95  3.7   |  23  |  1.12    Ca    9.0      01 May 2018 15:13    TPro  7.1  /  Alb  4.1  /  TBili  0.7  /  DBili  x   /  AST  12  /  ALT  8<L>  /  AlkPhos  42  05-01    PT/INR - ( 01 May 2018 15:13 )   PT: 23.4 sec;   INR: 2.13 ratio         PTT - ( 01 May 2018 15:13 )  PTT:51.2 sec      Venous Blood Gas:  05-01 @ 15:13  7.40/40/17/24/18  VBG Lactate: 1.3      MICROBIOLOGY:     RADIOLOGY:  [ ] Reviewed and interpreted by me    Point of Care Ultrasound Findings;    PFT:    EKG: HPI: 36 y/o female with PMHx of pulm htn w/ features of WHO Group I and IV. ALso has GERD, asthma on home O2 2L at bedtime, chronic PE on xarelto,    Pt states that 3 days ago, she was taking a shower, sat on the bed when she was done, felt like she was going to faint, had palpitations, and passed out  this is the third time she has lost consciousness; Of note, pt had not been taking her Adempas and Nifedipine for multiple days, and this is really the only change in behavior or habit that she endorses. She get very tachycardic even with blocking a few steps. Pt also states that she has dyspnea at rest, but that it worsens with ambulation.      With respect to her asthma, she has a hx of chronic asthma with triggers known to be changes in weather, allergies, and any URI Sxs.  Usually under control but has been bothering her lately. She endorses a cough as well over the last few days.  The cough is generally dry but occasionally productive with less than a teaspoon of clear mucus without blood or foul smell.  As per discussion, she denies PND and orthopnea; sleeps on 1-2 pillows at baseline. Last chest CT in 2016 showed enlarged main pulmonary artery w/ patchy bilateral ground glass opacities; last Echo in 2016 showed normal LV dimensions, hyperdynamic LV, flattening of IVS in systole/diastole c/w RV pressure overload, decreased RV systolic function w/ EF 82%.    PAST MEDICAL & SURGICAL HISTORY:  PE (pulmonary thromboembolism): on coumadin  Asthma with status asthmaticus, unspecified asthma severity  Sinus tachycardia  Asthma  Pulmonary embolism  Pulmonary hypertension  Anemia: On home O2 at 2L via NC  Tachycardia  Pulmonary hypertension  No significant past surgical history  History of tubal ligation      FAMILY HISTORY:  No pertinent family history in first degree relatives  Family history of diabetes mellitus in mother  Family history of diabetes mellitus    Allergies    No Known Allergies  Intolerances      HOME MEDICATIONS:    REVIEW OF SYSTEMS:  Constitutional: No fevers or chills. No weight loss. No fatigue or generalised malaise.  Eyes: No itching or discharge from the eyes  ENT: No ear pain. No ear discharge. No nasal congestion. No post nasal drip. No epistaxis. No throat pain. No sore throat. No difficulty swallowing.   CV: No chest pain. No palpitations. No lightheadedness or dizziness.   Resp: No dyspnea at rest. No dyspnea on exertion. No orthopnea. No wheezing. No cough. No stridor. No sputum production. No chest pain with respiration.  GI: No nausea. No vomiting. No diarrhea.  MSK: No joint pain or pain in any extremities  Integumentary: No skin lesions. No pedal edema.  Neurological: No gross motor weakness. No sensory changes.      OBJECTIVE:  ICU Vital Signs Last 24 Hrs  T(C): 37.1 (02 May 2018 05:24), Max: 37.1 (02 May 2018 05:24)  T(F): 98.7 (02 May 2018 05:24), Max: 98.7 (02 May 2018 05:24)  HR: 98 (02 May 2018 05:24) (80 - 106)  BP: 106/74 (02 May 2018 05:24) (100/71 - 138/73)  RR: 18 (02 May 2018 05:24) (18 - 20)  SpO2: 99% (02 May 2018 05:24) (96% - 100%)      PHYSICAL EXAM:  General: Awake, alert, oriented X 3.   HEENT: Atraumatic, normocephalic.                 Mallampatti Grade 2  Lymph Nodes: No palpable lymphadenopathy  Neck: No JVD. No carotid bruit.   Respiratory: Normal chest expansion                     Normal percussion  Cardiovascular: S1 S2 normal. No murmurs, rubs or gallops.   Abdomen: Soft, non-tender, non-distended. No organomegaly.  Extremities: Warm to touch. Peripheral pulse palpable. No pedal edema.   Skin: No rashes or skin lesions  Neurological: Motor and sensory examination equal and normal in all four extremities.  Psychiatry: Appropriate mood and affect.    HOSPITAL MEDICATIONS:  MEDICATIONS  (STANDING):  multivitamin 1 Tablet(s) Oral daily  NIFEdipine XL 60 milliGRAM(s) Oral <User Schedule>  pantoprazole    Tablet 40 milliGRAM(s) Oral before breakfast  predniSONE   Tablet 10 milliGRAM(s) Oral daily  rivaroxaban 15 milliGRAM(s) Oral every 12 hours  spironolactone 25 milliGRAM(s) Oral daily    MEDICATIONS  (PRN):  ipratropium  for Nebulization. 500 MICROGram(s) Nebulizer once PRN SOB      LABS:                        11.0   12.3  )-----------( 385      ( 01 May 2018 15:13 )             34.8     05-01    139  |  104  |  12  ----------------------------<  95  3.7   |  23  |  1.12    Ca    9.0      01 May 2018 15:13    TPro  7.1  /  Alb  4.1  /  TBili  0.7  /  DBili  x   /  AST  12  /  ALT  8<L>  /  AlkPhos  42  05-01    PT/INR - ( 01 May 2018 15:13 )   PT: 23.4 sec;   INR: 2.13 ratio         PTT - ( 01 May 2018 15:13 )  PTT:51.2 sec      Venous Blood Gas:  05-01 @ 15:13  7.40/40/17/24/18  VBG Lactate: 1.3      MICROBIOLOGY:     RADIOLOGY:  [X] Reviewed and interpreted by me

## 2018-05-02 NOTE — H&P ADULT - NSHPSOCIALHISTORY_GEN_ALL_CORE
Marital Status:  (   )    (   ) Single    (   )    (  )   Occupation:   Lives with: (  ) alone  (  ) children   (  ) spouse   (  ) parents  (  ) other    Substance Use (street drugs): (  ) never used  (  ) other:  Tobacco Usage:  (   ) never smoked   (   ) former smoker   (   ) current smoker  (     ) pack year  (        ) last cigarette date  Alcohol Usage:  Sexual History: Marital Status:  ( x  )    (   ) Single    (   )    (  )   Occupation:   Lives with: (  ) alone  ( x ) children   ( x ) spouse   (  ) parents  (  ) other    Substance Use (street drugs): ( x ) never used  (  ) other:  Tobacco Usage:  (  x ) never smoked   (   ) former smoker   (   ) current smoker  (     ) pack year  (        ) last cigarette date  Alcohol Usage: none   Sexual History: w/  only

## 2018-05-02 NOTE — H&P ADULT - ASSESSMENT
37 F 37 F w/ pulmonary HTN w/ Class I and III features (dx in 2014), chronic asthma, chronic PE on Xarelto, GERD p/w dyspnea at rest likely due to progression of pulmonary hypertensive disease, here for further work up.

## 2018-05-02 NOTE — H&P ADULT - PROBLEM SELECTOR PLAN 4
Hx of iron deficiency anemia; no s/s of bleeding  - c/w ferrous sulfate 325  - trend H/H via CBC  - transfuse if < 7; low probability of bleeding Unclear etiology of tachycardia; underlying structural heart disease versus chronic PE versus anxiety or a combination of all 3  - trend HR  - consider TSH and urine metanephrines ; would also consider UTOX to r/o drug use   - check orthostatics   - would probably benefit from telemetry monitoring given hx of tachycardia and multiple syncopal episodes  - will discuss starting low dose beta blocker with pulmonary

## 2018-05-02 NOTE — H&P ADULT - FAMILY HISTORY
Family history of diabetes mellitus     Family history of diabetes mellitus in mother     No pertinent family history in first degree relatives

## 2018-05-02 NOTE — H&P ADULT - NSHPPHYSICALEXAM_GEN_ALL_CORE
Vital Signs Last 24 Hrs  T(C): 37.1 (05-02-18 @ 05:24), Max: 37.1 (05-02-18 @ 05:24)  T(F): 98.7 (05-02-18 @ 05:24), Max: 98.7 (05-02-18 @ 05:24)  HR: 98 (05-02-18 @ 05:24) (80 - 106)  BP: 106/74 (05-02-18 @ 05:24) (100/71 - 138/73)  BP(mean): --  RR: 18 (05-02-18 @ 05:24) (18 - 20)  SpO2: 99% (05-02-18 @ 05:24) (96% - 100%)    PHYSICAL EXAM:  GENERAL: NAD, well-groomed, well-developed  HEAD:  Atraumatic, Normocephalic  EYES: EOMI, PERRLA, conjunctiva and sclera clear  ENMT: No tonsillar erythema, exudates, or enlargement; Moist mucous membranes, Good dentition, No lesions  NECK: Supple, No JVD, Normal thyroid  CHEST/LUNG: Clear to percussion bilaterally; No rales, rhonchi, wheezing, or rubs  HEART: Regular rate and rhythm; No murmurs, rubs, or gallops  ABDOMEN: Soft, Nontender, Nondistended; Bowel sounds present  EXTREMITIES:  2+ Peripheral Pulses, No clubbing, cyanosis, or edema  LYMPH: No lymphadenopathy noted  SKIN: No rashes or lesions  NERVOUS SYSTEM:  Alert & Oriented X3, Good concentration; Motor Strength 5/5 B/L upper and lower extremities; DTRs 2+ intact and symmetric Vital Signs Last 24 Hrs  T(C): 37.1 (05-02-18 @ 05:24), Max: 37.1 (05-02-18 @ 05:24)  T(F): 98.7 (05-02-18 @ 05:24), Max: 98.7 (05-02-18 @ 05:24)  HR: 98 (05-02-18 @ 05:24) (80 - 106)  BP: 106/74 (05-02-18 @ 05:24) (100/71 - 138/73)  RR: 18 (05-02-18 @ 05:24) (18 - 20)  SpO2: 99% (05-02-18 @ 05:24) (96% - 100%)    PHYSICAL EXAM:  GENERAL: NAD  HEAD:  NCAT  EYES: EOMI, PERRLA, conjunctiva and sclera clear  ENMT: No tonsillar erythema, exudates, or enlargement; Moist mucous membranes, Good dentition, No lesions  CHEST/LUNG: Clear to percussion bilaterally; No rales, rhonchi, wheezing, or rubs, no accessory muscle use  HEART: Regular rate and rhythm; No murmurs, rubs, or gallops  ABDOMEN: Soft, Nontender, Nondistended; Bowel sounds present  EXTREMITIES:  no edema  LYMPH: No lymphadenopathy noted  SKIN: No rashes or lesions

## 2018-05-02 NOTE — H&P ADULT - NSHPLABSRESULTS_GEN_ALL_CORE
05-01    139  |  104  |  12  ----------------------------<  95  3.7   |  23  |  1.12    Ca    9.0      01 May 2018 15:13    TPro  7.1  /  Alb  4.1  /  TBili  0.7  /  DBili  x   /  AST  12  /  ALT  8<L>  /  AlkPhos  42  05-01      PT/INR - ( 01 May 2018 15:13 )   PT: 23.4 sec;   INR: 2.13 ratio         PTT - ( 01 May 2018 15:13 )  PTT:51.2 sec                                        11.0   12.3  )-----------( 385      ( 01 May 2018 15:13 )             34.8     CAPILLARY BLOOD GLUCOSE        Blood Gas Source Venous: Venous (05-01-18 @ 15:13) 05-01    139  |  104  |  12  ----------------------------<  95  3.7   |  23  |  1.12    Ca    9.0      01 May 2018 15:13    TPro  7.1  /  Alb  4.1  /  TBili  0.7  /  DBili  x   /  AST  12  /  ALT  8<L>  /  AlkPhos  42  05-01      PT/INR - ( 01 May 2018 15:13 )   PT: 23.4 sec;   INR: 2.13 ratio    PTT - ( 01 May 2018 15:13 )  PTT:51.2 sec                            11.0   12.3  )-----------( 385      ( 01 May 2018 15:13 )             34.8     CAPILLARY BLOOD GLUCOSE        Blood Gas Source Venous: Venous (05-01-18 @ 15:13)

## 2018-05-02 NOTE — H&P ADULT - NSHPREVIEWOFSYSTEMS_GEN_ALL_CORE
REVIEW OF SYSTEMS:    CONSTITUTIONAL: No weakness, fevers or chills  EYES/ENT: No visual changes;  No vertigo or throat pain   NECK: No pain or stiffness  RESPIRATORY: No cough, wheezing, hemoptysis; No shortness of breath  CARDIOVASCULAR: No chest pain or palpitations  GASTROINTESTINAL: No abdominal or epigastric pain. No nausea, vomiting, or hematemesis; No diarrhea or constipation. No melena or hematochezia.  GENITOURINARY: No dysuria, frequency or hematuria  NEUROLOGICAL: No numbness or weakness  SKIN: No itching, burning, rashes, or lesions   All other review of systems is negative unless indicated above. REVIEW OF SYSTEMS:    CONSTITUTIONAL: No weakness, fevers or chills  EYES/ENT: No visual changes;  No vertigo or throat pain   NECK: No pain or stiffness  RESPIRATORY: SOB  CARDIOVASCULAR: No chest pain or palpitations  GASTROINTESTINAL: midepigastric pain w/o radiation   GENITOURINARY: No dysuria, frequency or hematuria  NEUROLOGICAL: No numbness or weakness  SKIN: No itching, burning, rashes, or lesions   All other review of systems is negative unless indicated above.

## 2018-05-03 LAB
ALBUMIN SERPL ELPH-MCNC: 3.9 G/DL — SIGNIFICANT CHANGE UP (ref 3.3–5)
ALP SERPL-CCNC: 38 U/L — LOW (ref 40–120)
ALT FLD-CCNC: 8 U/L — LOW (ref 10–45)
ANION GAP SERPL CALC-SCNC: 11 MMOL/L — SIGNIFICANT CHANGE UP (ref 5–17)
ANISOCYTOSIS BLD QL: SLIGHT — SIGNIFICANT CHANGE UP
AST SERPL-CCNC: 13 U/L — SIGNIFICANT CHANGE UP (ref 10–40)
BASOPHILS # BLD AUTO: 0.1 K/UL — SIGNIFICANT CHANGE UP (ref 0–0.2)
BASOPHILS NFR BLD AUTO: 0.7 % — SIGNIFICANT CHANGE UP (ref 0–2)
BILIRUB SERPL-MCNC: 0.5 MG/DL — SIGNIFICANT CHANGE UP (ref 0.2–1.2)
BUN SERPL-MCNC: 10 MG/DL — SIGNIFICANT CHANGE UP (ref 7–23)
CALCIUM SERPL-MCNC: 8.7 MG/DL — SIGNIFICANT CHANGE UP (ref 8.4–10.5)
CHLORIDE SERPL-SCNC: 104 MMOL/L — SIGNIFICANT CHANGE UP (ref 96–108)
CO2 SERPL-SCNC: 24 MMOL/L — SIGNIFICANT CHANGE UP (ref 22–31)
CREAT SERPL-MCNC: 1.12 MG/DL — SIGNIFICANT CHANGE UP (ref 0.5–1.3)
DACRYOCYTES BLD QL SMEAR: SLIGHT — SIGNIFICANT CHANGE UP
ELLIPTOCYTES BLD QL SMEAR: SLIGHT — SIGNIFICANT CHANGE UP
EOSINOPHIL # BLD AUTO: 0.2 K/UL — SIGNIFICANT CHANGE UP (ref 0–0.5)
EOSINOPHIL NFR BLD AUTO: 2.2 % — SIGNIFICANT CHANGE UP (ref 0–6)
GLUCOSE SERPL-MCNC: 102 MG/DL — HIGH (ref 70–99)
HCT VFR BLD CALC: 33.2 % — LOW (ref 34.5–45)
HGB BLD-MCNC: 10.2 G/DL — LOW (ref 11.5–15.5)
HYPOCHROMIA BLD QL: SLIGHT — SIGNIFICANT CHANGE UP
LYMPHOCYTES # BLD AUTO: 1.5 K/UL — SIGNIFICANT CHANGE UP (ref 1–3.3)
LYMPHOCYTES # BLD AUTO: 15.8 % — SIGNIFICANT CHANGE UP (ref 13–44)
MACROCYTES BLD QL: SLIGHT — SIGNIFICANT CHANGE UP
MAGNESIUM SERPL-MCNC: 1.8 MG/DL — SIGNIFICANT CHANGE UP (ref 1.6–2.6)
MCHC RBC-ENTMCNC: 19.9 PG — LOW (ref 27–34)
MCHC RBC-ENTMCNC: 30.7 GM/DL — LOW (ref 32–36)
MCV RBC AUTO: 64.7 FL — LOW (ref 80–100)
MICROCYTES BLD QL: SIGNIFICANT CHANGE UP
MONOCYTES # BLD AUTO: 0.4 K/UL — SIGNIFICANT CHANGE UP (ref 0–0.9)
MONOCYTES NFR BLD AUTO: 4.5 % — SIGNIFICANT CHANGE UP (ref 2–14)
NEUTROPHILS # BLD AUTO: 7.1 K/UL — SIGNIFICANT CHANGE UP (ref 1.8–7.4)
NEUTROPHILS NFR BLD AUTO: 76.8 % — SIGNIFICANT CHANGE UP (ref 43–77)
OVALOCYTES BLD QL SMEAR: SLIGHT — SIGNIFICANT CHANGE UP
PHOSPHATE SERPL-MCNC: 2.9 MG/DL — SIGNIFICANT CHANGE UP (ref 2.5–4.5)
PLAT MORPH BLD: NORMAL — SIGNIFICANT CHANGE UP
PLATELET # BLD AUTO: 367 K/UL — SIGNIFICANT CHANGE UP (ref 150–400)
POIKILOCYTOSIS BLD QL AUTO: SLIGHT — SIGNIFICANT CHANGE UP
POLYCHROMASIA BLD QL SMEAR: SLIGHT — SIGNIFICANT CHANGE UP
POTASSIUM SERPL-MCNC: 3.7 MMOL/L — SIGNIFICANT CHANGE UP (ref 3.5–5.3)
POTASSIUM SERPL-SCNC: 3.7 MMOL/L — SIGNIFICANT CHANGE UP (ref 3.5–5.3)
PROT SERPL-MCNC: 6.7 G/DL — SIGNIFICANT CHANGE UP (ref 6–8.3)
RBC # BLD: 5.13 M/UL — SIGNIFICANT CHANGE UP (ref 3.8–5.2)
RBC # FLD: 17.8 % — HIGH (ref 10.3–14.5)
RBC BLD AUTO: ABNORMAL
SODIUM SERPL-SCNC: 139 MMOL/L — SIGNIFICANT CHANGE UP (ref 135–145)
TARGETS BLD QL SMEAR: SLIGHT — SIGNIFICANT CHANGE UP
WBC # BLD: 9.3 K/UL — SIGNIFICANT CHANGE UP (ref 3.8–10.5)
WBC # FLD AUTO: 9.3 K/UL — SIGNIFICANT CHANGE UP (ref 3.8–10.5)

## 2018-05-03 PROCEDURE — 99233 SBSQ HOSP IP/OBS HIGH 50: CPT | Mod: GC

## 2018-05-03 RX ORDER — IPRATROPIUM BROMIDE 0.2 MG/ML
500 SOLUTION, NON-ORAL INHALATION EVERY 6 HOURS
Qty: 0 | Refills: 0 | Status: DISCONTINUED | OUTPATIENT
Start: 2018-05-03 | End: 2018-05-13

## 2018-05-03 RX ORDER — IPRATROPIUM BROMIDE 0.2 MG/ML
500 SOLUTION, NON-ORAL INHALATION ONCE
Qty: 0 | Refills: 0 | Status: COMPLETED | OUTPATIENT
Start: 2018-05-03 | End: 2018-05-03

## 2018-05-03 RX ADMIN — PANTOPRAZOLE SODIUM 40 MILLIGRAM(S): 20 TABLET, DELAYED RELEASE ORAL at 05:28

## 2018-05-03 RX ADMIN — Medication 500 MICROGRAM(S): at 17:36

## 2018-05-03 RX ADMIN — Medication 10 MILLIGRAM(S): at 05:23

## 2018-05-03 RX ADMIN — SPIRONOLACTONE 25 MILLIGRAM(S): 25 TABLET, FILM COATED ORAL at 05:24

## 2018-05-03 RX ADMIN — Medication 500 MICROGRAM(S): at 06:41

## 2018-05-03 RX ADMIN — Medication 60 MILLIGRAM(S): at 18:13

## 2018-05-03 RX ADMIN — Medication 60 MILLIGRAM(S): at 05:24

## 2018-05-03 RX ADMIN — Medication 100 MILLIGRAM(S): at 05:28

## 2018-05-03 RX ADMIN — Medication 100 MILLIGRAM(S): at 14:01

## 2018-05-03 RX ADMIN — Medication 500 MICROGRAM(S): at 00:10

## 2018-05-03 RX ADMIN — Medication 1 TABLET(S): at 11:47

## 2018-05-03 RX ADMIN — RIVAROXABAN 15 MILLIGRAM(S): KIT at 05:23

## 2018-05-03 RX ADMIN — Medication 100 MILLIGRAM(S): at 21:12

## 2018-05-03 NOTE — PROGRESS NOTE ADULT - PROBLEM SELECTOR PLAN 4
Unclear etiology of tachycardia; underlying structural heart disease versus chronic PE versus anxiety or a combination of all 3  - trend HR  - consider TSH and urine metanephrines ; would also consider UTOX to r/o drug use   - check orthostatics   - would probably benefit from telemetry monitoring given hx of tachycardia and multiple syncopal episodes  - will discuss starting low dose beta blocker with pulmonary

## 2018-05-03 NOTE — PROGRESS NOTE ADULT - ATTENDING COMMENTS
pt seen and examine d    d/w the team    pt seen and examined     d/w the team    pt with LANDRY [ WHO FUNCTIONAL CLASS III ]  has worsening features of PAH--recent admission for pneumonia  -obtain CTA  ECHO  - needs right heart cath    orlandod  armani  gentle diuresis  keep saturation >90%.

## 2018-05-03 NOTE — PROGRESS NOTE ADULT - PROBLEM SELECTOR PLAN 3
Pt has chronic asthma, recent increase in Atrovent nebulizer use as the weather is changing and she is having mild sputum production; not actively wheezing or in respiratory distress  - trend O2 sat  - supplemental O2 PRN  - Atrovent PRN Pt has chronic asthma, recent increase in Atrovent nebulizer use as the weather is changing and she is having mild sputum production; not actively wheezing or in respiratory distress  - trend O2 sat  - supplemental O2 PRN; O2 > 92   - Atrovent PRN

## 2018-05-03 NOTE — PROGRESS NOTE ADULT - PROBLEM SELECTOR PLAN 1
Pt has known pulmonary HTN and chronic asthma; based on presentation and initial LOC in setting of Rx non-compliance re: pHTN, likely a result of pulmonary HTN which may be worsening; however, pt is coughing, hoarse, and having mild sputum production so infectious etiology should be r/o   - f/u pulmonary recs from Dr. Yoon  - Atrovent nebulizer PRN  - supplemental O2 PRN  - will touch base with Dr. Yoon regarding cardiology consult for RHC  - consider TTE as last TTE was in 2016  - consider chest CT  - consider RVP Likely a result of pHTN c/b asthma as pt is using Atrovent; concern for possible URI sx as pt has productive cough; CTA w/o emergent findings  - f/u pulmonary recs from Dr. Yoon  - Atrovent nebulizer PRN  - supplemental O2 PRN  - f/u RHC  - f/u TTE  - consider RVP

## 2018-05-03 NOTE — PROGRESS NOTE ADULT - ASSESSMENT
37 F w/ pulmonary HTN w/ Class I and III features (dx in 2014), chronic asthma, chronic PE on Xarelto, GERD p/w dyspnea at rest likely due to progression of pulmonary hypertensive disease, here for further work up.

## 2018-05-03 NOTE — PROGRESS NOTE ADULT - PROBLEM SELECTOR PLAN 2
Pt was dx'd in 2014 with pulmonary HTN w/ features of Class III and Class I as per a pulmonary angiogram done at [facility]  - management as per pulmonary team  - consider TTE/chest CT to evaluate disease progression  - consider cardiology consult for RHC to re-evaluate PA pressures  - c/w Nifedipine ER 60 mg BID  - c/w Adempas 0.5 TID Pt was dx'd in 2014 with pulmonary HTN w/ features of Class III and Class I as per a pulmonary angiogram done at [facility]  - management as per pulmonary team  - f/u TTE  - f/u RHC for pulmonary pressures  - c/w Nifedipine ER 60 mg BID  - c/w Adempas 0.5 TID

## 2018-05-03 NOTE — PROGRESS NOTE ADULT - SUBJECTIVE AND OBJECTIVE BOX
Hugo Russell MD  Beeper: [phone] ([facility])/ [id number] ([facility])     Subjective:    Patient is a 37y old  Female who presents with a chief complaint of pulmonary artery HTN work up (02 May 2018 07:52)      Overnight events: no o/n events    MEDICATIONS  (STANDING):  benzonatate 100 milliGRAM(s) Oral three times a day  multivitamin 1 Tablet(s) Oral daily  NIFEdipine XL 60 milliGRAM(s) Oral <User Schedule>  pantoprazole    Tablet 40 milliGRAM(s) Oral before breakfast  predniSONE   Tablet 10 milliGRAM(s) Oral daily  rivaroxaban 15 milliGRAM(s) Oral every 12 hours  spironolactone 25 milliGRAM(s) Oral daily    MEDICATIONS  (PRN):  guaiFENesin    Syrup 100 milliGRAM(s) Oral every 6 hours PRN Cough      Objective:    Vitals: Vital Signs Last 24 Hrs  T(C): 36.9 (05-03-18 @ 05:13), Max: 36.9 (05-02-18 @ 21:32)  T(F): 98.5 (05-03-18 @ 05:13), Max: 98.5 (05-02-18 @ 21:32)  HR: 89 (05-03-18 @ 05:13) (75 - 108)  BP: 113/77 (05-03-18 @ 05:13) (95/65 - 115/78)  BP(mean): --  RR: 18 (05-03-18 @ 05:13) (16 - 20)  SpO2: 97% (05-03-18 @ 05:13) (96% - 100%)              I&O's Summary    02 May 2018 07:01  -  03 May 2018 07:00  --------------------------------------------------------  IN: 910 mL / OUT: 0 mL / NET: 910 mL        PHYSICAL EXAM:  GENERAL: NAD, well-groomed, well-developed  HEAD:  Atraumatic, Normocephalic  EYES: EOMI, PERRLA, conjunctiva and sclera clear  ENMT: No tonsillar erythema, exudates, or enlargement; Moist mucous membranes, Good dentition, No lesions  NECK: Supple, No JVD, Normal thyroid  CHEST/LUNG: Clear to percussion bilaterally; No rales, rhonchi, wheezing, or rubs  HEART: Regular rate and rhythm; No murmurs, rubs, or gallops  ABDOMEN: Soft, Nontender, Nondistended; Bowel sounds present  EXTREMITIES:  2+ Peripheral Pulses, No clubbing, cyanosis, or edema  LYMPH: No lymphadenopathy noted  SKIN: No rashes or lesions  NERVOUS SYSTEM:  Alert & Oriented X3, Good concentration; Motor Strength 5/5 B/L upper and lower extremities; DTRs 2+ intact and symmetric                                             LABS:  05-02    138  |  103  |  10  ----------------------------<  109<H>  4.0   |  22  |  1.05  05-01    139  |  104  |  12  ----------------------------<  95  3.7   |  23  |  1.12    Ca    9.1      02 May 2018 10:36  Ca    9.0      01 May 2018 15:13  Phos  3.0     05-02  Mg     1.9     05-02    TPro  6.9  /  Alb  4.0  /  TBili  0.9  /  DBili  x   /  AST  10  /  ALT  9<L>  /  AlkPhos  42  05-02  TPro  7.1  /  Alb  4.1  /  TBili  0.7  /  DBili  x   /  AST  12  /  ALT  8<L>  /  AlkPhos  42  05-01      PT/INR - ( 02 May 2018 10:36 )   PT: 16.4 sec;   INR: 1.49 ratio         PTT - ( 02 May 2018 10:36 )  PTT:42.6 sec                                              10.8   10.5  )-----------( 391      ( 02 May 2018 10:37 )             34.8                         11.0   12.3  )-----------( 385      ( 01 May 2018 15:13 )             34.8     CAPILLARY BLOOD GLUCOSE        CARDIAC MARKERS ( 01 May 2018 15:13 )  x     / <0.01 ng/mL / 38 U/L / x     / 1.0 ng/mL      RECENT CULTURES:      TELEMETRY:    ECG:    TTE:    RADIOLOGY & ADDITIONAL TESTS:    Imaging Personally Reviewed:  [ ] YES  [ ] NO    Consultants involved in case:   Consultant(s) Notes Reviewed:  [ ] YES  [ ] NO:   Care Discussed with Consultants/Other Providers [ ] YES  [ ] NO Hugo Russell MD  Beeper: [phone] ([facility])/ [phone] ([facility])     Subjective:    Patient is a 37y old  Female who presents with a chief complaint of pulmonary artery HTN work up (02 May 2018 07:52)    Overnight events: no o/n events    MEDICATIONS  (STANDING):  benzonatate 100 milliGRAM(s) Oral three times a day  multivitamin 1 Tablet(s) Oral daily  NIFEdipine XL 60 milliGRAM(s) Oral <User Schedule>  pantoprazole    Tablet 40 milliGRAM(s) Oral before breakfast  predniSONE   Tablet 10 milliGRAM(s) Oral daily  rivaroxaban 15 milliGRAM(s) Oral every 12 hours  spironolactone 25 milliGRAM(s) Oral daily    MEDICATIONS  (PRN):  guaiFENesin    Syrup 100 milliGRAM(s) Oral every 6 hours PRN Cough    Objective:    Vitals: Vital Signs Last 24 Hrs  T(C): 36.9 (05-03-18 @ 05:13), Max: 36.9 (05-02-18 @ 21:32)  T(F): 98.5 (05-03-18 @ 05:13), Max: 98.5 (05-02-18 @ 21:32)  HR: 89 (05-03-18 @ 05:13) (75 - 108)  BP: 113/77 (05-03-18 @ 05:13) (95/65 - 115/78)  RR: 18 (05-03-18 @ 05:13) (16 - 20)  SpO2: 97% (05-03-18 @ 05:13) (96% - 100%)              I&O's Summary    02 May 2018 07:01  -  03 May 2018 07:00  --------------------------------------------------------  IN: 910 mL / OUT: 0 mL / NET: 910 mL    PHYSICAL EXAM:  GENERAL: NAD  HEAD:  NCAT  EYES: PERLLA, sclera clear  ENMT: mmm  CHEST/LUNG: Clear to percussion bilaterally; No rales, rhonchi, wheezing, or rubs  HEART: Regular rate and rhythm; No murmurs, rubs, or gallops  ABDOMEN: Soft, Nontender, Nondistended; Bowel sounds present  EXTREMITIES:  no edema                                    LABS:  05-03    139  |  104  |  10  ----------------------------<  102<H>  3.7   |  24  |  1.12    Ca    8.7      03 May 2018 08:01  Phos  2.9     05-03  Mg     1.8     05-03    TPro  6.7  /  Alb  3.9  /  TBili  0.5  /  DBili  x   /  AST  13  /  ALT  8<L>  /  AlkPhos  38<L>  05-03    PT/INR - ( 02 May 2018 10:36 )   PT: 16.4 sec;   INR: 1.49 ratio         PTT - ( 02 May 2018 10:36 )  PTT:42.6 sec                                        10.2   9.3   )-----------( 367      ( 03 May 2018 08:01 )             33.2

## 2018-05-03 NOTE — PROGRESS NOTE ADULT - ASSESSMENT
38 y/o female with PMHx of pulm htn w/ features of WHO Group I and IV.    - f/u CTA results.   - Repeat ECHO  - For RHC today.  - Patient advised to bring in adempas. Will restart based on RHC results or consider alternative therapies including infusion therapies.     Will follow with you.     #[id number]

## 2018-05-03 NOTE — PROGRESS NOTE ADULT - ATTENDING COMMENTS
37 you with pulmonary HTN class I and III admitted with worsening SOB/SOUZA, tachycardia and several syncopal episodes.  CT angio chest negative for PE  --R heart cath on schedule for today  --cont out patient meds  --Xerlato for PE Hx (lifelong) .

## 2018-05-03 NOTE — PROGRESS NOTE ADULT - SUBJECTIVE AND OBJECTIVE BOX
Interval Events: No acute events noted. Patient on schedule for RHC today as per floor nurse.    REVIEW OF SYSTEMS:  Constitutional: No fevers or chills. No weight loss. No fatigue or generalised malaise.  Eyes: No itching or discharge from the eyes  ENT: No ear pain. No ear discharge. No nasal congestion. No post nasal drip. No epistaxis. No throat pain. No sore throat. No difficulty swallowing.   CV: No chest pain. No palpitations. No lightheadedness or dizziness.   Resp: No dyspnea at rest. No dyspnea on exertion. No orthopnea. No wheezing. No cough. No stridor. No sputum production. No chest pain with respiration.  GI: No nausea. No vomiting. No diarrhea.  MSK: No joint pain or pain in any extremities  Integumentary: No skin lesions. No pedal edema.  Neurological: No gross motor weakness. No sensory changes.      OBJECTIVE:  ICU Vital Signs Last 24 Hrs  T(C): 37.1 (02 May 2018 05:24), Max: 37.1 (02 May 2018 05:24)  T(F): 98.7 (02 May 2018 05:24), Max: 98.7 (02 May 2018 05:24)  HR: 98 (02 May 2018 05:24) (80 - 106)  BP: 106/74 (02 May 2018 05:24) (100/71 - 138/73)  RR: 18 (02 May 2018 05:24) (18 - 20)  SpO2: 99% (02 May 2018 05:24) (96% - 100%)      PHYSICAL EXAM:  General: Awake, alert, oriented X 3.   HEENT: Atraumatic, normocephalic.                 Mallampatti Grade 2  Lymph Nodes: No palpable lymphadenopathy  Neck: No JVD. No carotid bruit.   Respiratory: Normal chest expansion                     Normal percussion  Cardiovascular: S1 S2 normal. No murmurs, rubs or gallops.   Abdomen: Soft, non-tender, non-distended. No organomegaly.  Extremities: Warm to touch. Peripheral pulse palpable. No pedal edema.   Skin: No rashes or skin lesions  Neurological: Motor and sensory examination equal and normal in all four extremities.  Psychiatry: Appropriate mood and affect.    HOSPITAL MEDICATIONS:  MEDICATIONS  (STANDING):  multivitamin 1 Tablet(s) Oral daily  NIFEdipine XL 60 milliGRAM(s) Oral <User Schedule>  pantoprazole    Tablet 40 milliGRAM(s) Oral before breakfast  predniSONE   Tablet 10 milliGRAM(s) Oral daily  rivaroxaban 15 milliGRAM(s) Oral every 12 hours  spironolactone 25 milliGRAM(s) Oral daily    MEDICATIONS  (PRN):  ipratropium  for Nebulization. 500 MICROGram(s) Nebulizer once PRN SOB      LABS:                        11.0   12.3  )-----------( 385      ( 01 May 2018 15:13 )             34.8     05-01    139  |  104  |  12  ----------------------------<  95  3.7   |  23  |  1.12    Ca    9.0      01 May 2018 15:13    TPro  7.1  /  Alb  4.1  /  TBili  0.7  /  DBili  x   /  AST  12  /  ALT  8<L>  /  AlkPhos  42  05-01    PT/INR - ( 01 May 2018 15:13 )   PT: 23.4 sec;   INR: 2.13 ratio         PTT - ( 01 May 2018 15:13 )  PTT:51.2 sec      Venous Blood Gas:  05-01 @ 15:13  7.40/40/17/24/18  VBG Lactate: 1.3      MICROBIOLOGY:     RADIOLOGY:  [X] Reviewed and interpreted by me

## 2018-05-04 LAB
ALBUMIN SERPL ELPH-MCNC: 4.5 G/DL — SIGNIFICANT CHANGE UP (ref 3.3–5)
ALP SERPL-CCNC: 46 U/L — SIGNIFICANT CHANGE UP (ref 40–120)
ALT FLD-CCNC: 8 U/L — LOW (ref 10–45)
ANION GAP SERPL CALC-SCNC: 17 MMOL/L — SIGNIFICANT CHANGE UP (ref 5–17)
AST SERPL-CCNC: 13 U/L — SIGNIFICANT CHANGE UP (ref 10–40)
BILIRUB SERPL-MCNC: 0.9 MG/DL — SIGNIFICANT CHANGE UP (ref 0.2–1.2)
BUN SERPL-MCNC: 10 MG/DL — SIGNIFICANT CHANGE UP (ref 7–23)
CALCIUM SERPL-MCNC: 9.3 MG/DL — SIGNIFICANT CHANGE UP (ref 8.4–10.5)
CHLORIDE SERPL-SCNC: 99 MMOL/L — SIGNIFICANT CHANGE UP (ref 96–108)
CO2 SERPL-SCNC: 21 MMOL/L — LOW (ref 22–31)
CREAT SERPL-MCNC: 1.16 MG/DL — SIGNIFICANT CHANGE UP (ref 0.5–1.3)
GLUCOSE SERPL-MCNC: 134 MG/DL — HIGH (ref 70–99)
HCT VFR BLD CALC: 36.9 % — SIGNIFICANT CHANGE UP (ref 34.5–45)
HGB BLD-MCNC: 11.5 G/DL — SIGNIFICANT CHANGE UP (ref 11.5–15.5)
MAGNESIUM SERPL-MCNC: 1.9 MG/DL — SIGNIFICANT CHANGE UP (ref 1.6–2.6)
MCHC RBC-ENTMCNC: 20.2 PG — LOW (ref 27–34)
MCHC RBC-ENTMCNC: 31.1 GM/DL — LOW (ref 32–36)
MCV RBC AUTO: 65 FL — LOW (ref 80–100)
PHOSPHATE SERPL-MCNC: 3.2 MG/DL — SIGNIFICANT CHANGE UP (ref 2.5–4.5)
PLATELET # BLD AUTO: 476 K/UL — HIGH (ref 150–400)
POTASSIUM SERPL-MCNC: 3.5 MMOL/L — SIGNIFICANT CHANGE UP (ref 3.5–5.3)
POTASSIUM SERPL-SCNC: 3.5 MMOL/L — SIGNIFICANT CHANGE UP (ref 3.5–5.3)
PROT SERPL-MCNC: 7.6 G/DL — SIGNIFICANT CHANGE UP (ref 6–8.3)
RBC # BLD: 5.68 M/UL — HIGH (ref 3.8–5.2)
RBC # FLD: 17.9 % — HIGH (ref 10.3–14.5)
SODIUM SERPL-SCNC: 137 MMOL/L — SIGNIFICANT CHANGE UP (ref 135–145)
WBC # BLD: 13.2 K/UL — HIGH (ref 3.8–10.5)
WBC # FLD AUTO: 13.2 K/UL — HIGH (ref 3.8–10.5)

## 2018-05-04 PROCEDURE — 99233 SBSQ HOSP IP/OBS HIGH 50: CPT | Mod: GC

## 2018-05-04 PROCEDURE — 93451 RIGHT HEART CATH: CPT | Mod: 26

## 2018-05-04 PROCEDURE — 76937 US GUIDE VASCULAR ACCESS: CPT | Mod: 26

## 2018-05-04 RX ORDER — ACETAMINOPHEN 500 MG
650 TABLET ORAL EVERY 6 HOURS
Qty: 0 | Refills: 0 | Status: DISCONTINUED | OUTPATIENT
Start: 2018-05-04 | End: 2018-05-13

## 2018-05-04 RX ORDER — ACETAMINOPHEN 500 MG
650 TABLET ORAL ONCE
Qty: 0 | Refills: 0 | Status: COMPLETED | OUTPATIENT
Start: 2018-05-04 | End: 2018-05-04

## 2018-05-04 RX ADMIN — SPIRONOLACTONE 25 MILLIGRAM(S): 25 TABLET, FILM COATED ORAL at 06:10

## 2018-05-04 RX ADMIN — PANTOPRAZOLE SODIUM 40 MILLIGRAM(S): 20 TABLET, DELAYED RELEASE ORAL at 06:10

## 2018-05-04 RX ADMIN — Medication 10 MILLIGRAM(S): at 06:10

## 2018-05-04 RX ADMIN — Medication 60 MILLIGRAM(S): at 06:11

## 2018-05-04 RX ADMIN — Medication 650 MILLIGRAM(S): at 09:31

## 2018-05-04 RX ADMIN — RIVAROXABAN 15 MILLIGRAM(S): KIT at 18:00

## 2018-05-04 RX ADMIN — Medication 500 MICROGRAM(S): at 17:40

## 2018-05-04 RX ADMIN — Medication 100 MILLIGRAM(S): at 21:21

## 2018-05-04 RX ADMIN — Medication 1 TABLET(S): at 12:18

## 2018-05-04 RX ADMIN — Medication 500 MICROGRAM(S): at 06:18

## 2018-05-04 RX ADMIN — Medication 500 MICROGRAM(S): at 00:04

## 2018-05-04 RX ADMIN — Medication 650 MILLIGRAM(S): at 10:25

## 2018-05-04 RX ADMIN — Medication 100 MILLIGRAM(S): at 06:10

## 2018-05-04 NOTE — PROGRESS NOTE ADULT - ASSESSMENT
36 y/o female with PMHx of pulm htn w/ features of WHO Group I and IV.    - Repeat ECHO  - For RHC today.  - Patient advised to bring in adempas. Will restart based on RHC results or consider alternative therapies including infusion therapies. 38 y/o female with PMHx of pulm htn w/ features of WHO Group I and IV.    - Repeat ECHO  - RHC results noted. mPAP actually improved from previous cath in 2014. CO also improved.  - Restart adempas 2mg. Start at once a day on saturday, increase to twice a day on sunday and then back to baseline dose of 2mg TID on monday. Monitor for hypotension  - Dr. Yoon will discuss further therapeutic options including other combination therapies w/ patient on Monday.    Please call with questions over the weekend.

## 2018-05-04 NOTE — PROGRESS NOTE ADULT - PROBLEM SELECTOR PLAN 3
Pt has chronic asthma, recent increase in Atrovent nebulizer use as the weather is changing and she is having mild sputum production; not actively wheezing or in respiratory distress  - trend O2 sat  - supplemental O2 PRN; O2 > 92   - Atrovent PRN

## 2018-05-04 NOTE — PROGRESS NOTE ADULT - PROBLEM SELECTOR PLAN 1
Likely a result of pHTN c/b asthma as pt is using Atrovent; concern for possible URI sx as pt has productive cough; CTA w/o emergent findings  - f/u pulmonary recs from Dr. Yoon  - Atrovent nebulizer PRN  - supplemental O2 PRN; O2 > 90  - f/u RHC  - f/u TTE

## 2018-05-04 NOTE — PROGRESS NOTE ADULT - ATTENDING COMMENTS
37 you with pulmonary HTN class I and IV admitted with worsening SOB/SOUZA, tachycardia and several syncopal episodes.  CT angio chest negative for PE  --R heart cath on schedule for today  --cont out patient meds  --Xerlato for PE Hx (lifelong) .

## 2018-05-04 NOTE — PROGRESS NOTE ADULT - PROBLEM SELECTOR PLAN 2
Pt was dx'd in 2014 with pulmonary HTN w/ features of Class III and Class I as per a pulmonary angiogram done at [facility]  - management as per pulmonary team  - f/u TTE  - f/u RHC for pulmonary pressures  - c/w Nifedipine ER 60 mg BID  - c/w Adempas 0.5 TID

## 2018-05-04 NOTE — PROGRESS NOTE ADULT - SUBJECTIVE AND OBJECTIVE BOX
Hugo Russell MD  Beeper: [phone] ([facility])/ [phone] ([facility])     Subjective:    Patient is a 37y old  Female who presents with a chief complaint of pulmonary artery HTN work up (02 May 2018 07:52)    Overnight events: no overnight events    MEDICATIONS  (STANDING):  benzonatate 100 milliGRAM(s) Oral three times a day  ipratropium    for Nebulization 500 MICROGram(s) Nebulizer every 6 hours  multivitamin 1 Tablet(s) Oral daily  NIFEdipine XL 60 milliGRAM(s) Oral <User Schedule>  pantoprazole    Tablet 40 milliGRAM(s) Oral before breakfast  predniSONE   Tablet 10 milliGRAM(s) Oral daily  rivaroxaban 15 milliGRAM(s) Oral every 12 hours  spironolactone 25 milliGRAM(s) Oral daily    MEDICATIONS  (PRN):  guaiFENesin    Syrup 100 milliGRAM(s) Oral every 6 hours PRN Cough    Objective:    Vitals: Vital Signs Last 24 Hrs  T(C): 37 (05-04-18 @ 06:08), Max: 37 (05-03-18 @ 13:24)  T(F): 98.6 (05-04-18 @ 06:08), Max: 98.6 (05-03-18 @ 13:24)  HR: 96 (05-04-18 @ 06:19) (78 - 100)  BP: 108/72 (05-04-18 @ 06:08) (105/73 - 132/75)  RR: 18 (05-04-18 @ 06:08) (18 - 20)  SpO2: 98% (05-04-18 @ 06:19) (95% - 99%)              I&O's Summary    03 May 2018 07:01  -  04 May 2018 07:00  --------------------------------------------------------  IN: 810 mL / OUT: 0 mL / NET: 810 mL    PHYSICAL EXAM:  GENERAL: NAD  HEAD:  NCAT  EYES: sclera clear, EOMI  ENMT: mmm, no oral lesions   CHEST/LUNG: Clear to percussion bilaterally; No rales, rhonchi, wheezing, or rubs  HEART: Regular rate and rhythm; No murmurs, rubs, or gallops  ABDOMEN: Soft, Nontender, Nondistended; Bowel sounds present  EXTREMITIES:  no edema                                    LABS:  05-03    139  |  104  |  10  ----------------------------<  102<H>  3.7   |  24  |  1.12  05-02    138  |  103  |  10  ----------------------------<  109<H>  4.0   |  22  |  1.05  05-01    139  |  104  |  12  ----------------------------<  95  3.7   |  23  |  1.12    Ca    8.7      03 May 2018 08:01  Ca    9.1      02 May 2018 10:36  Ca    9.0      01 May 2018 15:13  Phos  2.9     05-03  Mg     1.8     05-03    TPro  6.7  /  Alb  3.9  /  TBili  0.5  /  DBili  x   /  AST  13  /  ALT  8<L>  /  AlkPhos  38<L>  05-03  TPro  6.9  /  Alb  4.0  /  TBili  0.9  /  DBili  x   /  AST  10  /  ALT  9<L>  /  AlkPhos  42  05-02  TPro  7.1  /  Alb  4.1  /  TBili  0.7  /  DBili  x   /  AST  12  /  ALT  8<L>  /  AlkPhos  42  05-01      PT/INR - ( 02 May 2018 10:36 )   PT: 16.4 sec;   INR: 1.49 ratio         PTT - ( 02 May 2018 10:36 )  PTT:42.6 sec                                              10.2   9.3   )-----------( 367      ( 03 May 2018 08:01 )             33.2                         10.8   10.5  )-----------( 391      ( 02 May 2018 10:37 )             34.8                         11.0   12.3  )-----------( 385      ( 01 May 2018 15:13 )             34.8     CAPILLARY BLOOD GLUCOSE            RECENT CULTURES:      TELEMETRY:    ECG:    TTE:    RADIOLOGY & ADDITIONAL TESTS:    Imaging Personally Reviewed:  [ ] YES  [ ] NO    Consultants involved in case:   Consultant(s) Notes Reviewed:  [ ] YES  [ ] NO:   Care Discussed with Consultants/Other Providers [ ] YES  [ ] NO

## 2018-05-04 NOTE — PROGRESS NOTE ADULT - PROBLEM SELECTOR PLAN 7
May have underlying anxiety disorder which is contributing to her tachycardia and compounding the issue  - [facility] consult for anxiety

## 2018-05-04 NOTE — PROGRESS NOTE ADULT - PROBLEM SELECTOR PLAN 4
Unclear etiology of tachycardia; underlying structural heart disease versus chronic PE versus anxiety or a combination of all 3  - trend HR  - consider TSH and urine metanephrines ; would also consider UTOX to r/o drug use   - check orthostatics   - will discuss starting low dose beta blocker with pulmonary

## 2018-05-04 NOTE — PROGRESS NOTE ADULT - SUBJECTIVE AND OBJECTIVE BOX
Interval Events: RHC wasnt done yesterday, On schedule for today.     REVIEW OF SYSTEMS:  Constitutional: No fevers or chills. No weight loss. No fatigue or generalised malaise.  Eyes: No itching or discharge from the eyes  ENT: No ear pain. No ear discharge. No nasal congestion. No post nasal drip. No epistaxis. No throat pain. No sore throat. No difficulty swallowing.   CV: No chest pain. No palpitations. No lightheadedness or dizziness.   Resp: No dyspnea at rest. No dyspnea on exertion. No orthopnea. No wheezing. No cough. No stridor. No sputum production. No chest pain with respiration.  GI: No nausea. No vomiting. No diarrhea.  MSK: No joint pain or pain in any extremities  Integumentary: No skin lesions. No pedal edema.  Neurological: No gross motor weakness. No sensory changes.      OBJECTIVE:  ICU Vital Signs Last 24 Hrs  T(C): 37 (04 May 2018 06:08), Max: 37 (03 May 2018 13:24)  T(F): 98.6 (04 May 2018 06:08), Max: 98.6 (03 May 2018 13:24)  HR: 96 (04 May 2018 06:19) (78 - 100)  BP: 108/72 (04 May 2018 06:08) (105/73 - 132/75)  RR: 18 (04 May 2018 06:08) (18 - 20)  SpO2: 98% (04 May 2018 06:19) (95% - 99%)      PHYSICAL EXAM:  General: Awake, alert, oriented X 3.   HEENT: Atraumatic, normocephalic.                 Mallampatti Grade 2  Lymph Nodes: No palpable lymphadenopathy  Neck: No JVD. No carotid bruit.   Respiratory: Normal chest expansion                     Normal percussion  Cardiovascular: S1 S2 normal. No murmurs, rubs or gallops.   Abdomen: Soft, non-tender, non-distended. No organomegaly.  Extremities: Warm to touch. Peripheral pulse palpable. No pedal edema.   Skin: No rashes or skin lesions  Neurological: Motor and sensory examination equal and normal in all four extremities.  Psychiatry: Appropriate mood and affect.    HOSPITAL MEDICATIONS:    MEDICATIONS  (STANDING):  benzonatate 100 milliGRAM(s) Oral three times a day  ipratropium    for Nebulization 500 MICROGram(s) Nebulizer every 6 hours  multivitamin 1 Tablet(s) Oral daily  NIFEdipine XL 60 milliGRAM(s) Oral <User Schedule>  pantoprazole    Tablet 40 milliGRAM(s) Oral before breakfast  predniSONE   Tablet 10 milliGRAM(s) Oral daily  rivaroxaban 15 milliGRAM(s) Oral every 12 hours  spironolactone 25 milliGRAM(s) Oral daily    MEDICATIONS  (PRN):  acetaminophen   Tablet. 650 milliGRAM(s) Oral every 6 hours PRN mild to moderate pain  guaiFENesin    Syrup 100 milliGRAM(s) Oral every 6 hours PRN Cough      LABS:                                   11.5   13.2  )-----------( 476      ( 04 May 2018 10:09 )             36.9     05-04    137  |  99  |  10  ----------------------------<  134<H>  3.5   |  21<L>  |  1.16    Ca    9.3      04 May 2018 10:09  Phos  3.2     05-04  Mg     1.9     05-04    TPro  7.6  /  Alb  4.5  /  TBili  0.9  /  DBili  x   /  AST  13  /  ALT  8<L>  /  AlkPhos  46  05-04        MICROBIOLOGY:     RADIOLOGY:  [X] Reviewed and interpreted by me - CTA showed no acute PE but mosacism which could be consistent with pulmonary vascular disease with a possible chronic thromboembolic component. Interval Events: RHC results noted. mPAP is improved and CO is increased.    REVIEW OF SYSTEMS:  Constitutional: No fevers or chills. No weight loss. No fatigue or generalised malaise.  Eyes: No itching or discharge from the eyes  ENT: No ear pain. No ear discharge. No nasal congestion. No post nasal drip. No epistaxis. No throat pain. No sore throat. No difficulty swallowing.   CV: No chest pain. No palpitations. No lightheadedness or dizziness.   Resp: No dyspnea at rest. No dyspnea on exertion. No orthopnea. No wheezing. No cough. No stridor. No sputum production. No chest pain with respiration.  GI: No nausea. No vomiting. No diarrhea.  MSK: No joint pain or pain in any extremities  Integumentary: No skin lesions. No pedal edema.  Neurological: No gross motor weakness. No sensory changes.      OBJECTIVE:  ICU Vital Signs Last 24 Hrs  T(C): 37 (04 May 2018 06:08), Max: 37 (03 May 2018 13:24)  T(F): 98.6 (04 May 2018 06:08), Max: 98.6 (03 May 2018 13:24)  HR: 96 (04 May 2018 06:19) (78 - 100)  BP: 108/72 (04 May 2018 06:08) (105/73 - 132/75)  RR: 18 (04 May 2018 06:08) (18 - 20)  SpO2: 98% (04 May 2018 06:19) (95% - 99%)      PHYSICAL EXAM:  General: Awake, alert, oriented X 3.   HEENT: Atraumatic, normocephalic.                 Mallampatti Grade 2  Lymph Nodes: No palpable lymphadenopathy  Neck: No JVD. No carotid bruit.   Respiratory: Normal chest expansion                     Normal percussion  Cardiovascular: S1 S2 normal. No murmurs, rubs or gallops.   Abdomen: Soft, non-tender, non-distended. No organomegaly.  Extremities: Warm to touch. Peripheral pulse palpable. No pedal edema.   Skin: No rashes or skin lesions  Neurological: Motor and sensory examination equal and normal in all four extremities.  Psychiatry: Appropriate mood and affect.    HOSPITAL MEDICATIONS:    MEDICATIONS  (STANDING):  benzonatate 100 milliGRAM(s) Oral three times a day  ipratropium    for Nebulization 500 MICROGram(s) Nebulizer every 6 hours  multivitamin 1 Tablet(s) Oral daily  NIFEdipine XL 60 milliGRAM(s) Oral <User Schedule>  pantoprazole    Tablet 40 milliGRAM(s) Oral before breakfast  predniSONE   Tablet 10 milliGRAM(s) Oral daily  rivaroxaban 15 milliGRAM(s) Oral every 12 hours  spironolactone 25 milliGRAM(s) Oral daily    MEDICATIONS  (PRN):  acetaminophen   Tablet. 650 milliGRAM(s) Oral every 6 hours PRN mild to moderate pain  guaiFENesin    Syrup 100 milliGRAM(s) Oral every 6 hours PRN Cough      LABS:                                   11.5   13.2  )-----------( 476      ( 04 May 2018 10:09 )             36.9     05-04    137  |  99  |  10  ----------------------------<  134<H>  3.5   |  21<L>  |  1.16    Ca    9.3      04 May 2018 10:09  Phos  3.2     05-04  Mg     1.9     05-04    TPro  7.6  /  Alb  4.5  /  TBili  0.9  /  DBili  x   /  AST  13  /  ALT  8<L>  /  AlkPhos  46  05-04        MICROBIOLOGY:     RADIOLOGY:  [X] Reviewed and interpreted by me - CTA showed no acute PE but mosacism which could be consistent with pulmonary vascular disease with a possible chronic thromboembolic component.     Cardiac Catheterizations  Pressures:  -- Pulmonary Artery (S/D/M): 88/31/56  Pressures:  -- Pulmonary Capillary Wedge: 10/10/6  Pressures:  -- Right Atrium (a/v/M): 13/4/4  Pressures:  -- Right Ventricle (s/edp): 90/14/--    Outputs:  -- OUTPUTS: CO by Xenia: 5.76  Outputs:  -- OUTPUTS: Xenia cardiac index: 2.81    Outputs:  -- RESISTANCES: Pulmonary vascular index (dsc): 1423.90  Outputs:  -- RESISTANCES: Pulmonary vascular index (Wood Units): 17.80  Outputs:  -- RESISTANCES: Pulmonary vascular resistance (dsc): 693.85  Outputs:  -- RESISTANCES: Pulmonary vascular resistance (Wood Units): 8.68

## 2018-05-05 LAB
ALBUMIN SERPL ELPH-MCNC: 3.9 G/DL — SIGNIFICANT CHANGE UP (ref 3.3–5)
ALP SERPL-CCNC: 41 U/L — SIGNIFICANT CHANGE UP (ref 40–120)
ALT FLD-CCNC: 8 U/L — LOW (ref 10–45)
ANION GAP SERPL CALC-SCNC: 14 MMOL/L — SIGNIFICANT CHANGE UP (ref 5–17)
AST SERPL-CCNC: 10 U/L — SIGNIFICANT CHANGE UP (ref 10–40)
BILIRUB SERPL-MCNC: 0.4 MG/DL — SIGNIFICANT CHANGE UP (ref 0.2–1.2)
BUN SERPL-MCNC: 14 MG/DL — SIGNIFICANT CHANGE UP (ref 7–23)
CALCIUM SERPL-MCNC: 8.7 MG/DL — SIGNIFICANT CHANGE UP (ref 8.4–10.5)
CHLORIDE SERPL-SCNC: 101 MMOL/L — SIGNIFICANT CHANGE UP (ref 96–108)
CO2 SERPL-SCNC: 23 MMOL/L — SIGNIFICANT CHANGE UP (ref 22–31)
CREAT SERPL-MCNC: 1.1 MG/DL — SIGNIFICANT CHANGE UP (ref 0.5–1.3)
GLUCOSE SERPL-MCNC: 81 MG/DL — SIGNIFICANT CHANGE UP (ref 70–99)
HCT VFR BLD CALC: 32.7 % — LOW (ref 34.5–45)
HGB BLD-MCNC: 10.3 G/DL — LOW (ref 11.5–15.5)
MAGNESIUM SERPL-MCNC: 1.8 MG/DL — SIGNIFICANT CHANGE UP (ref 1.6–2.6)
MCHC RBC-ENTMCNC: 20.4 PG — LOW (ref 27–34)
MCHC RBC-ENTMCNC: 31.6 GM/DL — LOW (ref 32–36)
MCV RBC AUTO: 64.5 FL — LOW (ref 80–100)
PHOSPHATE SERPL-MCNC: 4.2 MG/DL — SIGNIFICANT CHANGE UP (ref 2.5–4.5)
PLATELET # BLD AUTO: 362 K/UL — SIGNIFICANT CHANGE UP (ref 150–400)
POTASSIUM SERPL-MCNC: 3.5 MMOL/L — SIGNIFICANT CHANGE UP (ref 3.5–5.3)
POTASSIUM SERPL-SCNC: 3.5 MMOL/L — SIGNIFICANT CHANGE UP (ref 3.5–5.3)
PROT SERPL-MCNC: 6.8 G/DL — SIGNIFICANT CHANGE UP (ref 6–8.3)
RBC # BLD: 5.07 M/UL — SIGNIFICANT CHANGE UP (ref 3.8–5.2)
RBC # FLD: 17.8 % — HIGH (ref 10.3–14.5)
SODIUM SERPL-SCNC: 138 MMOL/L — SIGNIFICANT CHANGE UP (ref 135–145)
WBC # BLD: 11.7 K/UL — HIGH (ref 3.8–10.5)
WBC # FLD AUTO: 11.7 K/UL — HIGH (ref 3.8–10.5)

## 2018-05-05 PROCEDURE — 99233 SBSQ HOSP IP/OBS HIGH 50: CPT | Mod: GC

## 2018-05-05 PROCEDURE — 93010 ELECTROCARDIOGRAM REPORT: CPT

## 2018-05-05 RX ADMIN — Medication 10 MILLIGRAM(S): at 06:22

## 2018-05-05 RX ADMIN — Medication 500 MICROGRAM(S): at 17:28

## 2018-05-05 RX ADMIN — Medication 650 MILLIGRAM(S): at 00:59

## 2018-05-05 RX ADMIN — Medication 60 MILLIGRAM(S): at 18:39

## 2018-05-05 RX ADMIN — Medication 650 MILLIGRAM(S): at 01:29

## 2018-05-05 RX ADMIN — Medication 100 MILLIGRAM(S): at 06:21

## 2018-05-05 RX ADMIN — Medication 500 MICROGRAM(S): at 23:34

## 2018-05-05 RX ADMIN — PANTOPRAZOLE SODIUM 40 MILLIGRAM(S): 20 TABLET, DELAYED RELEASE ORAL at 06:22

## 2018-05-05 RX ADMIN — Medication 1 TABLET(S): at 12:30

## 2018-05-05 RX ADMIN — RIVAROXABAN 15 MILLIGRAM(S): KIT at 09:56

## 2018-05-05 RX ADMIN — Medication 100 MILLIGRAM(S): at 21:34

## 2018-05-05 RX ADMIN — Medication 500 MICROGRAM(S): at 06:45

## 2018-05-05 RX ADMIN — RIVAROXABAN 15 MILLIGRAM(S): KIT at 18:12

## 2018-05-05 RX ADMIN — SPIRONOLACTONE 25 MILLIGRAM(S): 25 TABLET, FILM COATED ORAL at 06:23

## 2018-05-05 RX ADMIN — Medication 100 MILLIGRAM(S): at 15:00

## 2018-05-05 NOTE — PROGRESS NOTE ADULT - PROBLEM SELECTOR PLAN 1
Likely a result of pHTN c/b asthma as pt is using Atrovent; low probability of infectious causes; RHC shows improved mPAP and CO from previous RHC in 2014  - f/u pulmonary recs from Dr. Yoon  - Sarah nebulizer PRN  - supplemental O2 PRN; O2 > 90  - f/u TTE

## 2018-05-05 NOTE — PROGRESS NOTE ADULT - PROBLEM SELECTOR PLAN 4
Unclear etiology of tachycardia; underlying structural heart disease versus chronic PE versus anxiety or a combination of all 3  - trend HR  - consider cards consult per pulm if deemed necessary   - check orthostatics   - will discuss starting low dose beta blocker with pulmonary

## 2018-05-05 NOTE — PROGRESS NOTE ADULT - SUBJECTIVE AND OBJECTIVE BOX
Interval Events: No acute events overnight. Pt seen and examined at bedside today.     OBJECTIVE:  ICU Vital Signs Last 24 Hrs  T(C): 36.7 (05 May 2018 15:00), Max: 37.1 (04 May 2018 17:18)  T(F): 98.1 (05 May 2018 15:00), Max: 98.8 (04 May 2018 17:18)  HR: 90 (05 May 2018 15:00) (85 - 96)  BP: 91/65 (05 May 2018 15:00) (91/65 - 108/73)  BP(mean): --  ABP: --  ABP(mean): --  RR: 18 (05 May 2018 15:00) (18 - 18)  SpO2: 96% (05 May 2018 15:00) (95% - 100%)        05-05 @ 07:01  -  05-05 @ 15:31  --------------------------------------------------------  IN: 480 mL / OUT: 0 mL / NET: 480 mL      CAPILLARY BLOOD GLUCOSE          HOSPITAL MEDICATIONS:  MEDICATIONS  (STANDING):  benzonatate 100 milliGRAM(s) Oral three times a day  ipratropium    for Nebulization 500 MICROGram(s) Nebulizer every 6 hours  multivitamin 1 Tablet(s) Oral daily  NIFEdipine XL 60 milliGRAM(s) Oral <User Schedule>  pantoprazole    Tablet 40 milliGRAM(s) Oral before breakfast  predniSONE   Tablet 10 milliGRAM(s) Oral daily  rivaroxaban 15 milliGRAM(s) Oral every 12 hours  spironolactone 25 milliGRAM(s) Oral daily    MEDICATIONS  (PRN):  acetaminophen   Tablet. 650 milliGRAM(s) Oral every 6 hours PRN mild to moderate pain  guaiFENesin    Syrup 100 milliGRAM(s) Oral every 6 hours PRN Cough      LABS:                        10.3   11.7  )-----------( 362      ( 05 May 2018 07:34 )             32.7     Hgb Trend: 10.3<--, 11.5<--, 10.2<--, 10.8<--, 11.0<--  05-05    138  |  101  |  14  ----------------------------<  81  3.5   |  23  |  1.10    Ca    8.7      05 May 2018 07:34  Phos  4.2     05-05  Mg     1.8     05-05    TPro  6.8  /  Alb  3.9  /  TBili  0.4  /  DBili  x   /  AST  10  /  ALT  8<L>  /  AlkPhos  41  05-05    Creatinine Trend: 1.10<--, 1.16<--, 1.12<--, 1.05<--, 1.12<--      RADIOLOGY:  [X] Reviewed and interpreted by me     Cardiac Catheterizations  Pressures:  -- Pulmonary Artery (S/D/M): 88/31/56  Pressures:  -- Pulmonary Capillary Wedge: 10/10/6  Pressures:  -- Right Atrium (a/v/M): 13/4/4  Pressures:  -- Right Ventricle (s/edp): 90/14/--    Outputs:  -- OUTPUTS: CO by Xenia: 5.76  Outputs:  -- OUTPUTS: Xenia cardiac index: 2.81    Outputs:  -- RESISTANCES: Pulmonary vascular index (dsc): 1423.90  Outputs:  -- RESISTANCES: Pulmonary vascular index (Wood Units): 17.80  Outputs:  -- RESISTANCES: Pulmonary vascular resistance (dsc): 693.85  Outputs:  -- RESISTANCES: Pulmonary vascular resistance (Wood Units): 8.68      EKG:  Reviewed

## 2018-05-05 NOTE — PROGRESS NOTE ADULT - ATTENDING COMMENTS
BP borderline. Will need to discuss with pulm BP parameters for initiation of Adempas given borderline BP and known side effect of hypotension

## 2018-05-05 NOTE — CONSULT NOTE ADULT - ATTENDING COMMENTS
pt seen and examined     d/w the team    pt with LANDRY [ WHO FUNCTIONAL CLASSIII]  has worsening features of PAH--recent admisioin for pneumonia  -obtain CTA  ECHO  -may need right heart cath    contd  adampas  gentle diuresis  keep saturation >90%
Patient seen and examined by me. Case discussed with Cardiology Fellow Dr. Silveira, and agree with assessment and plan unless otherwise noted below.    Briefly, 37 y.o F w/ Pulmonary HTN (features of Class I, IV), GERD, Asthma on Home O2, chronic pulmonary embolism (dx 2014) on xaretlo, admitted 05/01/2018 after progressive dyspnea, s/p recent admission at OSH for syncope where she was sitting on the bed, noted palpitations, and had sudden LOC (awoke on the floor after several minutes, no confusion, no loss of bowel control, no urinary incontinence, reports she was shaking). In the hospital CT-Angio done 05/02/2018 and negative for pulmonary embolism. Cardiology consulted for palpitations.    1. Syncope, Palpitations - Recent syncopal episode supicious for cardiac arrythmia related syncope. CT-Angio negative on admission, cardiac enzyme negative x 1 on admission. s/p RHC 05/04/2018 with PA mean 56 mmHG.  -Please obtain orthostatics  -Please monitor on Telemetry  -Please order Routine TTE  -Please order TSH    Thank you for this interesting consult. My team will continue to follow along with you.    John Hopkins MD  Cardiology Attending  [facility] / [facility]   Cell: [phone]  (Cardiology Nocturnist cell number available 7 pm - 7 am every night; available daytime week days for follow-up only; daytime weekends covered by general cardiology consult service)

## 2018-05-05 NOTE — PROGRESS NOTE ADULT - PROBLEM SELECTOR PLAN 2
Pt was dx'd in 2014 with pulmonary HTN w/ features of Class IV and Class I as per a pulmonary angiogram done at [facility]  - management as per pulmonary team  - f/u TTE  - c/w Nifedipine ER 60 mg BID  - Restart adempas 2mg; 2 mg QD on Saturday --> 2 mg BID on Sunday --> 2 mg TID on Monday; will monitor for hypotension

## 2018-05-05 NOTE — CONSULT NOTE ADULT - SUBJECTIVE AND OBJECTIVE BOX
Patient seen and evaluated @ 2:00 PM  Chief Complaint: Palpitations    HPI:  38 y/o female with PMHx of pulm htn w/ features of Class I/IV, GERD, asthma on home O2 2L at bedtime, chronic PE on xarelto, iron deficiency anemia sent in by pulmonologist Dr. Vivian Yoon to be admitted for further workup for worsening dyspnea on exertion.     Pt states that 3 days ago, she was taking a shower, sat on the bed when she was done, felt like she was going to faint, had palpitations, and passed out - this is the third time she has lost consciousness; no bowel/bladder incontinence/tongue biting/fasciculations, no post-ictal state, not related to change in position, but did vomit and stated that her whole body was shaking as per her .  She went to [facility] in [place] for evaluation where she states that her work up was negative; but after contacting Dr. Yoon, he advised her to come into the hospital for further work up.  Of note, pt had not been taking her Adempas and Nifedipine for multiple days, and this is really the only change in behavior or habit that she endorses.      Pt states that she has dyspnea at rest, but that it worsens with ambulation.  More importantly, she feels quite tachycardic when she walks and endorses significant anxiety about doing anything because she's afraid of her tachycardia, stating that she feels it's like a panic attack in some way, which exacerbates her underlying heart rate.      With respect to her asthma, she has a hx of chronic asthma with triggers known to be changes in weather, allergies, and any URI Sxs.  She does not wake up with an asthma attack, and her frequency of Atrovent inhaler use varies.  As of late, since she has had increased phlegm production and hoarseness, she is using her Atrovent nebulizer every night.      She endorses a cough as well over the last few days.  The cough is generally dry but occasionally productive with less than a teaspoon of clear mucus without blood or foul smell.  As per discussion, she denies PND and orthopnea; sleeps on 1-2 pillows at baseline.      Lastly, pt had some sharp midepigastric pain w/o radiation after receiving her medication in the ED, now abating.  No n/v/diarrhea/constipation at this time.      Last chest CT in 2016 showed enlarged main pulmonary artery w/ patchy bilateral ground glass opacities; last Echo in 2016 showed normal LV dimensions, hyperdynamic LV, flattening of IVS in systole/diastole c/w RV pressure overload, decreased RV systolic function w/ EF 82%.    ROS otherwise negative.    In the ED:  VS: afebrile, HR , -138/71-73, RR 18-20, SaO2 96-99%  Received: ipratropium x 1, prednisone 10, protonix 40, Nifedipine 60 (02 May 2018 07:52)    Upon my evaluation patient reports intermittent palpitations daily to weekly. Subsequently after the palpitations she has chest pain. Prior hospitalizations without events recorded on tele.    PMH:   PE (pulmonary thromboembolism)  Asthma with status asthmaticus, unspecified asthma severity  Sinus tachycardia  Asthma  Pulmonary embolism  Pulmonary hypertension  Anemia  Tachycardia  Pulmonary hypertension  Asthma    PSH:   No significant past surgical history  History of tubal ligation    Medications:   acetaminophen   Tablet. 650 milliGRAM(s) Oral every 6 hours PRN  benzonatate 100 milliGRAM(s) Oral three times a day  guaiFENesin    Syrup 100 milliGRAM(s) Oral every 6 hours PRN  ipratropium    for Nebulization 500 MICROGram(s) Nebulizer every 6 hours  multivitamin 1 Tablet(s) Oral daily  NIFEdipine XL 60 milliGRAM(s) Oral <User Schedule>  pantoprazole    Tablet 40 milliGRAM(s) Oral before breakfast  predniSONE   Tablet 10 milliGRAM(s) Oral daily  rivaroxaban 15 milliGRAM(s) Oral every 12 hours  spironolactone 25 milliGRAM(s) Oral daily    Allergies:  No Known Allergies    FAMILY HISTORY:  No pertinent family history in first degree relatives  Family history of diabetes mellitus in mother  Family history of diabetes mellitus    Social History:  [place]    Review of Systems:  Constitutional: [ ] Fever [ ] Chills [ ] Fatigue [ ] Weight change   HEENT: [ ] Blurred vision [ ] Eye Pain [ ] Headache [ ] Runny nose [ ] Sore Throat   Respiratory: [ ] Cough [ ] Wheezing [ ] Shortness of breath  Cardiovascular: [ ] Chest Pain [ ] Palpitations [ ] SOUZA [ ] PND [ ] Orthopnea  Gastrointestinal: [ ] Abdominal Pain [ ] Diarrhea [ ] Constipation [ ] Hemorrhoids [ ] Nausea [ ] Vomiting  Genitourinary: [ ] Nocturia [ ] Dysuria [ ] Incontinence  Extremities: [ ] Swelling [ ] Joint Pain  Neurologic: [ ] Focal deficit [ ] Paresthesias [ ] Syncope  Lymphatic: [ ] Swelling [ ] Lymphadenopathy   Skin: [ ] Rash [ ] Ecchymoses [ ] Wounds [ ] Lesions  Psychiatry: [ ] Depression [ ] Suicidal/Homicidal Ideation [ ] Anxiety [ ] Sleep Disturbances  [ ] 10 point review of systems is otherwise negative except as mentioned above            [ ]Unable to obtain    Physical Exam:  T(C): 36.7 (18 @ 15:00), Max: 37.1 (18 @ 17:18)  HR: 90 (18 @ 15:00) (85 - 96)  BP: 91/65 (18 @ 15:00) (91/65 - 108/73)  RR: 18 (18 @ 15:00) (18 - 18)  SpO2: 96% (18 @ 15:00) (95% - 100%)  Wt(kg): --     @ 07:01  -   @ 16:54  --------------------------------------------------------  IN: 480 mL / OUT: 0 mL / NET: 480 mL      Daily     Daily     Appearance: NAD  Eyes: PERRL, EOMI  HENT: Normal oral muscosa, NC/AT  Cardiovascular: normal S1 and S2, RRR, no m/r/g, no edema, normal JVP  Procedural Access Site:  R brachial vein No hematoma, non-tender to palpation, 2+ pulses distally, no bruit, no ecchymosis  Respiratory: Clear to auscultation bilaterally  Gastrointestinal: Soft, non-tender, non-distended, BS+  Musculoskeletal: No clubbing, no joint deformity   Neurologic: Non-focal  Lymphatic: No lymphadenopathy  Psychiatry: AAOx3, mood & affect appropriate  Skin: No rashes, no ecchymoses, no cyanosis    Cardiovascular Diagnostic Testing:  ECG: NSR 81 bpm, RVH, TWI inferiorly and precordium    Echo:  TTE 2016  EF 80%  Conclusions:  1. Normal left ventricular internal dimensions and wall  thicknesses.  2. Hyperdynamic left ventricle. Flattening of the  interventricular septum in both systole and diastole is  consistent with right ventricular pressure overload.  3. Decreased right ventricular systolic function.  4. Trace pericardial effusion.    Stress Testing: none    Cath:  2018  HEMODYNAMICS: Hemodynamic assessment demonstrates normal pulmonary  capillary wedge pressure. There is severe pulmonary hypertension.  COMPLICATIONS: There were no complications.  DIAGNOSTIC RECOMMENDATIONS: Medical management is recommended.  Prepared and signed by  Norma Stringer M.D.  Signed 2018 16:14:37  HEMODYNAMIC TABLES  Pressures:  Baseline  Pressures:  - HR: 88  Pressures:  - Rhythm:  Pressures:  -- Pulmonary Artery (S/D/M): 88/31/56  Pressures:  -- Pulmonary Capillary Wedge: 10/10/6  Pressures:  -- Right Atrium (a/v/M): 13/4/4  Pressures:  -- Right Ventricle (s/edp): 90/14/--  O2 Sats:  Baseline  O2 Sats:  - HR: 88  O2 Sats:  - Rhythm:  O2 Sats:  -- AO: 9.8/97/12.93  O2 Sats:  -- PA: 9.8/61.2/8.16  Outputs:  Baseline  Outputs:  -- CALCULATIONS[age]  Outputs:  -- CALCULATIONS: Body Surface Area: 2.05  Outputs:  -- CALCULATIONS: Height in cm: 167.00  Outputs:  -- CALCULATIONS: Sex: Female  Outputs:  -- CALCULATIONS: Weight in k.00  Outputs:  -- OUTPUTS: CO by Xenia: 5.76  Outputs:  -- OUTPUTS: Xenia cardiac index: 2.81  Outputs:  -- OUTPUTS: O2 consumption: 275.00  Outputs:  -- OUTPUTS: Vo2 Indexed: 134.00  Outputs:  -- RESISTANCES: Pulmonary vascular index (dsc): 1423.90  Outputs:  -- RESISTANCES: Pulmonary vascular index (Wood Units): 17.80  Outputs:  -- RESISTANCES: Pulmonary vascular resistance (dsc): 693.85  Outputs:  -- RESISTANCES: Pulmonary vascular resistance (Wood Units): 8.68  Outputs:  -- RESISTANCES: Right ventricular stroke work: 46.32  Outputs:  -- RESISTANCES: Right ventricular stroke work index: 22.57  Outputs:  -- RESISTANCES: Total pulmonary index (dsc): 1594.77  Outputs:  -- RESISTANCES: Total pulmonary index (Wood Units): 19.94  Outputs:  -- RESISTANCES: Total pulmonary resistance (dsc): 777.11  Outputs:  -- RESISTANCES: Total pulmonary resistance (Wood Units): 9.72  Outputs:  -- SHUNTS: Pulmonary flow: 5.76  Outputs:  -- SHUNTS: Qp Indexed: 2.81  Outputs:  -- SHUNTS: Qs Indexed: 2.81  Outputs:  -- SHUNTS: Systemic flow: 5.76    Interpretation of Telemetry: none    Imaging:  none    Labs:                        10.3   11.7  )-----------( 362      ( 05 May 2018 07:34 )             32.7     05-05    138  |  101  |  14  ----------------------------<  81  3.5   |  23  |  1.10    Ca    8.7      05 May 2018 07:34  Phos  4.2     05-05  Mg     1.8     05-05    TPro  6.8  /  Alb  3.9  /  TBili  0.4  /  DBili  x   /  AST  10  /  ALT  8<L>  /  AlkPhos  41  05-05 Patient seen and evaluated @ 2:00 PM  Chief Complaint: Palpitations    HPI:  36 y/o female with PMHx of pulm htn w/ features of Class I/IV, GERD, asthma on home O2 2L at bedtime, chronic PE on xarelto, iron deficiency anemia sent in by pulmonologist Dr. Vivian Yoon to be admitted for further workup for worsening dyspnea on exertion.     Pt states that 3 days ago, she was taking a shower, sat on the bed when she was done, felt like she was going to faint, had palpitations, and passed out - this is the third time she has lost consciousness; no bowel/bladder incontinence/tongue biting/fasciculations, no post-ictal state, not related to change in position, but did vomit and stated that her whole body was shaking as per her .  She went to [facility] in [place] for evaluation where she states that her work up was negative; but after contacting Dr. Yoon, he advised her to come into the hospital for further work up.  Of note, pt had not been taking her Adempas and Nifedipine for multiple days, and this is really the only change in behavior or habit that she endorses.      Pt states that she has dyspnea at rest, but that it worsens with ambulation.  More importantly, she feels quite tachycardic when she walks and endorses significant anxiety about doing anything because she's afraid of her tachycardia, stating that she feels it's like a panic attack in some way, which exacerbates her underlying heart rate.      With respect to her asthma, she has a hx of chronic asthma with triggers known to be changes in weather, allergies, and any URI Sxs.  She does not wake up with an asthma attack, and her frequency of Atrovent inhaler use varies.  As of late, since she has had increased phlegm production and hoarseness, she is using her Atrovent nebulizer every night.      She endorses a cough as well over the last few days.  The cough is generally dry but occasionally productive with less than a teaspoon of clear mucus without blood or foul smell.  As per discussion, she denies PND and orthopnea; sleeps on 1-2 pillows at baseline.      Lastly, pt had some sharp midepigastric pain w/o radiation after receiving her medication in the ED, now abating.  No n/v/diarrhea/constipation at this time.      Last chest CT in 2016 showed enlarged main pulmonary artery w/ patchy bilateral ground glass opacities; last Echo in 2016 showed normal LV dimensions, hyperdynamic LV, flattening of IVS in systole/diastole c/w RV pressure overload, decreased RV systolic function w/ EF 82%.    ROS otherwise negative.    In the ED:  VS: afebrile, HR , -138/71-73, RR 18-20, SaO2 96-99%  Received: ipratropium x 1, prednisone 10, protonix 40, Nifedipine 60 (02 May 2018 07:52)    Upon my evaluation patient reports intermittent palpitations daily to weekly. Subsequently after the palpitations she has chest pain. Prior hospitalizations without events recorded on tele.    PMH:   PE (pulmonary thromboembolism)  Asthma with status asthmaticus, unspecified asthma severity  Sinus tachycardia  Asthma  Pulmonary embolism  Pulmonary hypertension  Anemia  Tachycardia  Pulmonary hypertension  Asthma    PSH:   No significant past surgical history  History of tubal ligation    Medications:   acetaminophen   Tablet. 650 milliGRAM(s) Oral every 6 hours PRN  benzonatate 100 milliGRAM(s) Oral three times a day  guaiFENesin    Syrup 100 milliGRAM(s) Oral every 6 hours PRN  ipratropium    for Nebulization 500 MICROGram(s) Nebulizer every 6 hours  multivitamin 1 Tablet(s) Oral daily  NIFEdipine XL 60 milliGRAM(s) Oral <User Schedule>  pantoprazole    Tablet 40 milliGRAM(s) Oral before breakfast  predniSONE   Tablet 10 milliGRAM(s) Oral daily  rivaroxaban 15 milliGRAM(s) Oral every 12 hours  spironolactone 25 milliGRAM(s) Oral daily    Allergies:  No Known Allergies    FAMILY HISTORY:  No pertinent family history in first degree relatives  Family history of diabetes mellitus in mother  Family history of diabetes mellitus    Social History:  Never smoked, no ETOH, no IVDU    Review of Systems:  Constitutional: [ ] Fever [ ] Chills [ ] Fatigue [ ] Weight change   HEENT: [ ] Blurred vision [ ] Eye Pain [ ] Headache [ ] Runny nose [ ] Sore Throat   Respiratory: [ ] Cough [ ] Wheezing [ ] Shortness of breath  Cardiovascular: [ ] Chest Pain [ ] Palpitations [ ] SOUZA [ ] PND [ ] Orthopnea  Gastrointestinal: [ ] Abdominal Pain [ ] Diarrhea [ ] Constipation [ ] Hemorrhoids [ ] Nausea [ ] Vomiting  Genitourinary: [ ] Nocturia [ ] Dysuria [ ] Incontinence  Extremities: [ ] Swelling [ ] Joint Pain  Neurologic: [ ] Focal deficit [ ] Paresthesias [ ] Syncope  Lymphatic: [ ] Swelling [ ] Lymphadenopathy   Skin: [ ] Rash [ ] Ecchymoses [ ] Wounds [ ] Lesions  Psychiatry: [ ] Depression [ ] Suicidal/Homicidal Ideation [ ] Anxiety [ ] Sleep Disturbances  [ ] 10 point review of systems is otherwise negative except as mentioned above            [ ]Unable to obtain    Physical Exam:  T(C): 36.7 (18 @ 15:00), Max: 37.1 (18 @ 17:18)  HR: 90 (18 @ 15:00) (85 - 96)  BP: 91/65 (18 @ 15:00) (91/65 - 108/73)  RR: 18 (18 @ 15:00) (18 - 18)  SpO2: 96% (18 @ 15:00) (95% - 100%)  Wt(kg): --     @ 07:01  -   @ 16:54  --------------------------------------------------------  IN: 480 mL / OUT: 0 mL / NET: 480 mL      Daily     Daily     Appearance: NAD  Eyes: PERRL, EOMI  HENT: Normal oral muscosa, NC/AT  Cardiovascular: normal S1 and S2, RRR, no m/r/g, no edema, normal JVP  Procedural Access Site:  R brachial vein No hematoma, non-tender to palpation, 2+ pulses distally, no bruit, no ecchymosis  Respiratory: Clear to auscultation bilaterally  Gastrointestinal: Soft, non-tender, non-distended, BS+  Musculoskeletal: No clubbing, no joint deformity   Neurologic: Non-focal  Lymphatic: No lymphadenopathy  Psychiatry: AAOx3, mood & affect appropriate  Skin: No rashes, no ecchymoses, no cyanosis    Cardiovascular Diagnostic Testing:  ECG: NSR 81 bpm, RVH, TWI inferiorly and precordium    Echo:  TTE 2016  EF 80%  Conclusions:  1. Normal left ventricular internal dimensions and wall  thicknesses.  2. Hyperdynamic left ventricle. Flattening of the  interventricular septum in both systole and diastole is  consistent with right ventricular pressure overload.  3. Decreased right ventricular systolic function.  4. Trace pericardial effusion.    Stress Testing: none    Cath:  2018  HEMODYNAMICS: Hemodynamic assessment demonstrates normal pulmonary  capillary wedge pressure. There is severe pulmonary hypertension.  COMPLICATIONS: There were no complications.  DIAGNOSTIC RECOMMENDATIONS: Medical management is recommended.  Prepared and signed by  Norma Stringer M.D.  Signed 2018 16:14:37  HEMODYNAMIC TABLES  Pressures:  Baseline  Pressures:  - HR: 88  Pressures:  - Rhythm:  Pressures:  -- Pulmonary Artery (S/D/M): 88/31/56  Pressures:  -- Pulmonary Capillary Wedge: 10/10/6  Pressures:  -- Right Atrium (a/v/M): 13/4/4  Pressures:  -- Right Ventricle (s/edp): 90/14/--  O2 Sats:  Baseline  O2 Sats:  - HR: 88  O2 Sats:  - Rhythm:  O2 Sats:  -- AO: 9.8/97/12.93  O2 Sats:  -- PA: 9.8/61.2/8.16  Outputs:  Baseline  Outputs:  -- CALCULATIONS[age]  Outputs:  -- CALCULATIONS: Body Surface Area: 2.05  Outputs:  -- CALCULATIONS: Height in cm: 167.00  Outputs:  -- CALCULATIONS: Sex: Female  Outputs:  -- CALCULATIONS: Weight in k.00  Outputs:  -- OUTPUTS: CO by Xenia: 5.76  Outputs:  -- OUTPUTS: Xenia cardiac index: 2.81  Outputs:  -- OUTPUTS: O2 consumption: 275.00  Outputs:  -- OUTPUTS: Vo2 Indexed: 134.00  Outputs:  -- RESISTANCES: Pulmonary vascular index (dsc): 1423.90  Outputs:  -- RESISTANCES: Pulmonary vascular index (Wood Units): 17.80  Outputs:  -- RESISTANCES: Pulmonary vascular resistance (dsc): 693.85  Outputs:  -- RESISTANCES: Pulmonary vascular resistance (Wood Units): 8.68  Outputs:  -- RESISTANCES: Right ventricular stroke work: 46.32  Outputs:  -- RESISTANCES: Right ventricular stroke work index: 22.57  Outputs:  -- RESISTANCES: Total pulmonary index (dsc): 1594.77  Outputs:  -- RESISTANCES: Total pulmonary index (Wood Units): 19.94  Outputs:  -- RESISTANCES: Total pulmonary resistance (dsc): 777.11  Outputs:  -- RESISTANCES: Total pulmonary resistance (Wood Units): 9.72  Outputs:  -- SHUNTS: Pulmonary flow: 5.76  Outputs:  -- SHUNTS: Qp Indexed: 2.81  Outputs:  -- SHUNTS: Qs Indexed: 2.81  Outputs:  -- SHUNTS: Systemic flow: 5.76    Interpretation of Telemetry: none    Imaging:  none    Labs:                        10.3   11.7  )-----------( 362      ( 05 May 2018 07:34 )             32.7     05-05    138  |  101  |  14  ----------------------------<  81  3.5   |  23  |  1.10    Ca    8.7      05 May 2018 07:34  Phos  4.2     05-05  Mg     1.8     05-05    TPro  6.8  /  Alb  3.9  /  TBili  0.4  /  DBili  x   /  AST  10  /  ALT  8<L>  /  AlkPhos  41  05-05

## 2018-05-05 NOTE — PROGRESS NOTE ADULT - ASSESSMENT
Pt is a 37F with PMHx pulmHTN (WHO Group I and IV), GERD, asthma (nocturnal O2 supplementation dependent), and chronic PE on xarelto who presented with syncope and SOUZA s/p RHC found to have improvement in mPAP and cardiac output restarted on home pulmHTN meds now found to have persistent hypotension.   -c/w Nifedipine ER 60 mg BID as tolerated  -Given low BP, will hold Adempas for now. Dr. Yoon to evaluate pt on Monday and discuss further treatment options  -Pulmonary to follow

## 2018-05-05 NOTE — PROGRESS NOTE ADULT - SUBJECTIVE AND OBJECTIVE BOX
Hugo Russell MD  Beeper: [phone] ([facility])/ [id number] ([facility])     Subjective:    Patient is a 37y old  Female who presents with a chief complaint of pulmonary artery HTN work up (02 May 2018 07:52) - RHC results  Pressures:  -- Pulmonary Artery (S/D/M): 88/31/56  Pressures:  -- Pulmonary Capillary Wedge: 10/10/6  Outputs:  -- OUTPUTS: CO by Xenia: 5.76  Outputs:  -- RESISTANCES: Pulmonary vascular resistance (Wood Units): 8.68    Overnight events: no o/n events    MEDICATIONS  (STANDING):  benzonatate 100 milliGRAM(s) Oral three times a day  ipratropium    for Nebulization 500 MICROGram(s) Nebulizer every 6 hours  multivitamin 1 Tablet(s) Oral daily  NIFEdipine XL 60 milliGRAM(s) Oral <User Schedule>  pantoprazole    Tablet 40 milliGRAM(s) Oral before breakfast  predniSONE   Tablet 10 milliGRAM(s) Oral daily  rivaroxaban 15 milliGRAM(s) Oral every 12 hours  spironolactone 25 milliGRAM(s) Oral daily    MEDICATIONS  (PRN):  acetaminophen   Tablet. 650 milliGRAM(s) Oral every 6 hours PRN mild to moderate pain  guaiFENesin    Syrup 100 milliGRAM(s) Oral every 6 hours PRN Cough      Objective:    Vitals: Vital Signs Last 24 Hrs  T(C): 37 (05-05-18 @ 05:43), Max: 37.1 (05-04-18 @ 17:18)  T(F): 98.6 (05-05-18 @ 05:43), Max: 98.8 (05-04-18 @ 17:18)  HR: 95 (05-05-18 @ 06:45) (85 - 102)  BP: 96/65 (05-05-18 @ 05:43) (95/65 - 112/66)  RR: 18 (05-05-18 @ 05:43) (18 - 18)  SpO2: 98% (05-05-18 @ 06:45) (95% - 99%)              I&O's Summary    03 May 2018 07:01  -  04 May 2018 07:00  --------------------------------------------------------  IN: 810 mL / OUT: 0 mL / NET: 810 mL    PHYSICAL EXAM:  GENERAL: NAD  HEAD:  NCAT  EYES:  PERRLA, conjunctiva and sclera clear  ENMT: mmm  CHEST/LUNG: CTAB  HEART: Regular rate and rhythm; No murmurs, rubs, or gallops  ABDOMEN: Soft, Nontender, Nondistended; Bowel sounds present  EXTREMITIES:  no edema                                         LABS:  05-04    137  |  99  |  10  ----------------------------<  134<H>  3.5   |  21<L>  |  1.16  05-03    139  |  104  |  10  ----------------------------<  102<H>  3.7   |  24  |  1.12  05-02    138  |  103  |  10  ----------------------------<  109<H>  4.0   |  22  |  1.05    Ca    9.3      04 May 2018 10:09  Ca    8.7      03 May 2018 08:01  Ca    9.1      02 May 2018 10:36  Phos  3.2     05-04  Mg     1.9     05-04    TPro  7.6  /  Alb  4.5  /  TBili  0.9  /  DBili  x   /  AST  13  /  ALT  8<L>  /  AlkPhos  46  05-04  TPro  6.7  /  Alb  3.9  /  TBili  0.5  /  DBili  x   /  AST  13  /  ALT  8<L>  /  AlkPhos  38<L>  05-03  TPro  6.9  /  Alb  4.0  /  TBili  0.9  /  DBili  x   /  AST  10  /  ALT  9<L>  /  AlkPhos  42  05-02               11.5   13.2  )-----------( 476      ( 04 May 2018 10:09 )             36.9                         10.2   9.3   )-----------( 367      ( 03 May 2018 08:01 )             33.2                         10.8   10.5  )-----------( 391      ( 02 May 2018 10:37 )             34.8

## 2018-05-05 NOTE — CONSULT NOTE ADULT - ASSESSMENT
36 y/o female with PMHx of pulm htn w/ features of Class I/IV, GERD, asthma on home O2 2L at bedtime, chronic PE on xarelto, iron deficiency anemia sent in by pulmonologist Dr. Vivian Yoon to be admitted for further workup for worsening dyspnea on exertion complaining of palpitations. Prior workups have been unrevealing.    -- monitor on tele  -- check EKG  -- further management pending identification of arrhythmia  -- may require Holter monitor on discharge    Jose Silveira MD

## 2018-05-05 NOTE — PROGRESS NOTE ADULT - ATTENDING COMMENTS
Patient seen and examined. Pulmonary HTN (Grp 1/IV) who is now s/p RHC.  -Plan initially was to restart Adempas this weekend however patient has had borderline BP (SBP ). It is reasonable to hold the Adempas thru the weekend and then readdress on MOnday. If patients BP is acceptable will resume Adempas otherwise may require other PH directed therapy.  -Dr. Yoon to followup next week

## 2018-05-05 NOTE — PROGRESS NOTE ADULT - ASSESSMENT
37 F w/ pulmonary HTN w/ Class I and III features (dx in 2014), chronic asthma, chronic PE on Xarelto, GERD p/w dyspnea at rest likely due to progression of pulmonary hypertensive disease s/p RHC showing improved mPAP and CO.

## 2018-05-06 LAB
ALBUMIN SERPL ELPH-MCNC: 3.8 G/DL — SIGNIFICANT CHANGE UP (ref 3.3–5)
ALP SERPL-CCNC: 36 U/L — LOW (ref 40–120)
ALT FLD-CCNC: 8 U/L — LOW (ref 10–45)
ANION GAP SERPL CALC-SCNC: 12 MMOL/L — SIGNIFICANT CHANGE UP (ref 5–17)
AST SERPL-CCNC: 11 U/L — SIGNIFICANT CHANGE UP (ref 10–40)
BILIRUB SERPL-MCNC: 0.6 MG/DL — SIGNIFICANT CHANGE UP (ref 0.2–1.2)
BUN SERPL-MCNC: 12 MG/DL — SIGNIFICANT CHANGE UP (ref 7–23)
CALCIUM SERPL-MCNC: 8.7 MG/DL — SIGNIFICANT CHANGE UP (ref 8.4–10.5)
CHLORIDE SERPL-SCNC: 104 MMOL/L — SIGNIFICANT CHANGE UP (ref 96–108)
CO2 SERPL-SCNC: 22 MMOL/L — SIGNIFICANT CHANGE UP (ref 22–31)
CREAT SERPL-MCNC: 1.08 MG/DL — SIGNIFICANT CHANGE UP (ref 0.5–1.3)
GLUCOSE SERPL-MCNC: 79 MG/DL — SIGNIFICANT CHANGE UP (ref 70–99)
HCT VFR BLD CALC: 32.6 % — LOW (ref 34.5–45)
HGB BLD-MCNC: 10.3 G/DL — LOW (ref 11.5–15.5)
MAGNESIUM SERPL-MCNC: 1.8 MG/DL — SIGNIFICANT CHANGE UP (ref 1.6–2.6)
MCHC RBC-ENTMCNC: 20.4 PG — LOW (ref 27–34)
MCHC RBC-ENTMCNC: 31.6 GM/DL — LOW (ref 32–36)
MCV RBC AUTO: 64.4 FL — LOW (ref 80–100)
PHOSPHATE SERPL-MCNC: 3.9 MG/DL — SIGNIFICANT CHANGE UP (ref 2.5–4.5)
PLATELET # BLD AUTO: 350 K/UL — SIGNIFICANT CHANGE UP (ref 150–400)
POTASSIUM SERPL-MCNC: 3.4 MMOL/L — LOW (ref 3.5–5.3)
POTASSIUM SERPL-SCNC: 3.4 MMOL/L — LOW (ref 3.5–5.3)
PROT SERPL-MCNC: 6.7 G/DL — SIGNIFICANT CHANGE UP (ref 6–8.3)
RBC # BLD: 5.06 M/UL — SIGNIFICANT CHANGE UP (ref 3.8–5.2)
RBC # FLD: 17.5 % — HIGH (ref 10.3–14.5)
SODIUM SERPL-SCNC: 138 MMOL/L — SIGNIFICANT CHANGE UP (ref 135–145)
WBC # BLD: 11.7 K/UL — HIGH (ref 3.8–10.5)
WBC # FLD AUTO: 11.7 K/UL — HIGH (ref 3.8–10.5)

## 2018-05-06 PROCEDURE — 93308 TTE F-UP OR LMTD: CPT | Mod: 26

## 2018-05-06 PROCEDURE — 99233 SBSQ HOSP IP/OBS HIGH 50: CPT | Mod: GC

## 2018-05-06 PROCEDURE — 76604 US EXAM CHEST: CPT | Mod: 26

## 2018-05-06 PROCEDURE — 99222 1ST HOSP IP/OBS MODERATE 55: CPT

## 2018-05-06 PROCEDURE — 99291 CRITICAL CARE FIRST HOUR: CPT | Mod: 25

## 2018-05-06 RX ORDER — POTASSIUM CHLORIDE 20 MEQ
40 PACKET (EA) ORAL ONCE
Qty: 0 | Refills: 0 | Status: COMPLETED | OUTPATIENT
Start: 2018-05-06 | End: 2018-05-06

## 2018-05-06 RX ADMIN — SPIRONOLACTONE 25 MILLIGRAM(S): 25 TABLET, FILM COATED ORAL at 05:39

## 2018-05-06 RX ADMIN — Medication 100 MILLIGRAM(S): at 21:48

## 2018-05-06 RX ADMIN — Medication 500 MICROGRAM(S): at 18:17

## 2018-05-06 RX ADMIN — RIVAROXABAN 15 MILLIGRAM(S): KIT at 09:28

## 2018-05-06 RX ADMIN — Medication 60 MILLIGRAM(S): at 21:48

## 2018-05-06 RX ADMIN — Medication 100 MILLIGRAM(S): at 05:39

## 2018-05-06 RX ADMIN — Medication 500 MICROGRAM(S): at 23:20

## 2018-05-06 RX ADMIN — Medication 40 MILLIEQUIVALENT(S): at 09:28

## 2018-05-06 RX ADMIN — Medication 100 MILLIGRAM(S): at 16:34

## 2018-05-06 RX ADMIN — RIVAROXABAN 15 MILLIGRAM(S): KIT at 21:48

## 2018-05-06 RX ADMIN — Medication 60 MILLIGRAM(S): at 05:38

## 2018-05-06 RX ADMIN — Medication 10 MILLIGRAM(S): at 05:39

## 2018-05-06 RX ADMIN — Medication 500 MICROGRAM(S): at 11:40

## 2018-05-06 RX ADMIN — PANTOPRAZOLE SODIUM 40 MILLIGRAM(S): 20 TABLET, DELAYED RELEASE ORAL at 05:43

## 2018-05-06 RX ADMIN — Medication 1 TABLET(S): at 16:35

## 2018-05-06 NOTE — PROGRESS NOTE ADULT - PROBLEM SELECTOR PLAN 9
DVT/PE: Xarelto  GOC: full code  Dispo: home

## 2018-05-06 NOTE — PROGRESS NOTE ADULT - SUBJECTIVE AND OBJECTIVE BOX
Subjective:    Patient is a 37y old  Female who presents with a chief complaint of pulmonary artery HTN work up    Overnight events: no o/n events. Patient denies palpitations since her last episode yesterday. Denies cp, sob, fever, chills, n/v/d    MEDICATIONS  (STANDING):  benzonatate 100 milliGRAM(s) Oral three times a day  ipratropium    for Nebulization 500 MICROGram(s) Nebulizer every 6 hours  multivitamin 1 Tablet(s) Oral daily  NIFEdipine XL 60 milliGRAM(s) Oral <User Schedule>  pantoprazole    Tablet 40 milliGRAM(s) Oral before breakfast  predniSONE   Tablet 10 milliGRAM(s) Oral daily  rivaroxaban 15 milliGRAM(s) Oral every 12 hours  spironolactone 25 milliGRAM(s) Oral daily    MEDICATIONS  (PRN):  acetaminophen   Tablet. 650 milliGRAM(s) Oral every 6 hours PRN mild to moderate pain  guaiFENesin    Syrup 100 milliGRAM(s) Oral every 6 hours PRN Cough          Vital Signs Last 24 Hrs  T(C): 37 (06 May 2018 04:43), Max: 37 (06 May 2018 04:43)  T(F): 98.6 (06 May 2018 04:43), Max: 98.6 (06 May 2018 04:43)  HR: 98 (06 May 2018 04:43) (83 - 98)  BP: 114/77 (06 May 2018 04:43) (91/65 - 114/77)  BP(mean): --  RR: 18 (06 May 2018 04:43) (18 - 18)  SpO2: 98% (06 May 2018 04:43) (96% - 100%)      05-05 @ 07:01  -  05-06 @ 07:00  --------------------------------------------------------  IN: 480 mL / OUT: 0 mL / NET: 480 mL        PHYSICAL EXAM:  GENERAL: NAD  HEAD:  NCAT  EYES:  PERRLA, conjunctiva and sclera clear  ENMT: mmm  CHEST/LUNG: CTAB  HEART: Regular rate and rhythm; No murmurs, rubs, or gallops  ABDOMEN: Soft, Nontender, Nondistended; Bowel sounds present  EXTREMITIES:  no edema                                         LABS:                          10.3   11.7  )-----------( 350      ( 06 May 2018 07:07 )             32.6       05-06    138  |  104  |  12  ----------------------------<  79  3.4<L>   |  22  |  1.08    Ca    8.7      06 May 2018 07:07  Phos  3.9     05-06  Mg     1.8     05-06    TPro  6.7  /  Alb  3.8  /  TBili  0.6  /  DBili  x   /  AST  11  /  ALT  8<L>  /  AlkPhos  36<L>  05-06        RHC:   Pressures:  -- Pulmonary Artery (S/D/M): 88/31/56  Pressures:  -- Pulmonary Capillary Wedge: 10/10/6  Outputs:  -- OUTPUTS: CO by Xenia: 5.76  Outputs:  -- RESISTANCES: Pulmonary vascular resistance ([place] Units): 8.68       TTE pending

## 2018-05-06 NOTE — PROGRESS NOTE ADULT - ATTENDING COMMENTS
Complained of palpitations yesterday, as well as reported recent syncopal episode. Given the palpitations cardiology was consulted. There is suspicion for possible arrhythmia. To transfer patient to tele. Will obtain Echo  Holding Adempas given borderline BP's. Pulm to readdress on Monday

## 2018-05-06 NOTE — PROGRESS NOTE ADULT - PROBLEM SELECTOR PLAN 4
Unclear etiology of tachycardia; underlying structural heart disease versus chronic PE versus anxiety or a combination of all 3  - trend HR on telemetry  - will discuss starting low dose beta blocker with cards if necessary  - patient may need holter monitor outpatient. Unclear etiology of tachycardia; may be symptomatic cardiac arrythmia vs orthostatic hypotension (2/2 nifedipine?)   - TSH and TTE ordered  - trend HR on telemetry  - will discuss starting low dose beta blocker with cards if necessary Pt has chronic asthma, recent increase in Atrovent nebulizer use as the weather is changing and she is having mild sputum production; not actively wheezing or in respiratory distress  - supplemental O2 PRN; O2 > 92   - Atrovent PRN

## 2018-05-06 NOTE — PROGRESS NOTE ADULT - PROBLEM SELECTOR PLAN 3
Pt has chronic asthma, recent increase in Atrovent nebulizer use as the weather is changing and she is having mild sputum production; not actively wheezing or in respiratory distress  - trend O2 sat  - supplemental O2 PRN; O2 > 92   - Atrovent PRN Pt has chronic asthma, recent increase in Atrovent nebulizer use as the weather is changing and she is having mild sputum production; not actively wheezing or in respiratory distress  - supplemental O2 PRN; O2 > 92   - Atrovent PRN Pt was dx'd in 2014 with pulmonary HTN w/ features of Class I and Class IV as per a pulmonary angiogram done at [facility]. S/p RHC 5/4 showing improved mPAP (56mmHg) and CO as compared to 2014  - c/w Nifedipine ER 60 mg BID with BP parameters   - Hold adempas for now in setting of hypotension  - Pulmonology following, recommendations appreciated

## 2018-05-06 NOTE — PROGRESS NOTE ADULT - PROBLEM SELECTOR PLAN 7
May have underlying anxiety disorder which is contributing to her tachycardia and compounding the issue  - SWK consult for anxiety Regular diet

## 2018-05-06 NOTE — PROGRESS NOTE ADULT - PROBLEM SELECTOR PLAN 2
Pt was dx'd in 2014 with pulmonary HTN w/ features of Class IV and Class I as per a pulmonary angiogram done at [facility]  - management as per pulmonary team  - f/u TTE  - c/w Nifedipine ER 60 mg BID with BP parameters   - Hold adempas and resume if pulm recommends. Pt was dx'd in 2014 with pulmonary HTN w/ features of Class I and Class IV as per a pulmonary angiogram done at [facility]. S/p RHC 5/4 showing improved mPAP (56mmHg) and CO as compared to 2014  - c/w Nifedipine ER 60 mg BID with BP parameters   - Hold adempas for now in setting of hypotension  - Pulmonology following, recommendations appreciated Likely a result of pHTN c/b asthma as pt is using Atrovent vs cardiac etiology (symptomatic tachycardia); low probability of infectious causes; RHC shows improved mPAP and CO from previous RHC in 2014  Cardiac etiology more likely. Patient was found to have symptomatic tachycardia, suspect arrhythmia. Will monitor on tele. Found to have orthostatic hypotension ( laying -> 96 sitting)  - Pending TTE and TSH  - Atrovent nebulizer PRN  - supplemental O2 PRN  - cardiology following, recommendations appreciated  - pulmonology following, recommendations appreciated Likely a result of pHTN c/b asthma as pt is using Atrovent vs cardiac etiology (symptomatic tachycardia); low probability of infectious causes; RHC shows improved mPAP and CO from previous RHC in 2014  Cardiac etiology more likely. Patient was found to have symptomatic tachycardia, suspect arrhythmia. Will monitor on tele.  - Pending accurate orthostatic vital measurements (documentation of supine  and sitting SBP 96, no documentation of standing BP)  - Pending TTE and TSH  - Atrovent nebulizer PRN  - supplemental O2 PRN  - cardiology following, recommendations appreciated  - pulmonology following, recommendations appreciated

## 2018-05-06 NOTE — PROGRESS NOTE ADULT - PROBLEM SELECTOR PLAN 2
Pt was dx'd in 2014 with pulmonary HTN w/ features of Class IV and Class I as per a pulmonary angiogram done at [facility]  - management as per pulmonary team  - f/u TTE  - c/w Nifedipine ER 60 mg BID with BP parameters   - Hold adempas and resume if pulm recommends.

## 2018-05-06 NOTE — PROGRESS NOTE ADULT - ASSESSMENT
37 F w/ pulmonary HTN w/ Class I and III features (dx in 2014), chronic asthma, chronic PE on Xarelto, GERD p/w dyspnea at rest likely due to progression of pulmonary hypertensive disease s/p RHC showing improved mPAP and CO. 37 F w/ pulmonary HTN w/ Class I/IV features (dx in 2014), chronic asthma, chronic PE on Xarelto, GERD p/w dyspnea at rest now s/p RHC found to have improvement in mPAP and cardiac output restarted on home pulmHTN meds now found to have persistent hypotension with symptomatic tachycardia. Transferred to [facility] for tele monitoring.

## 2018-05-06 NOTE — PROGRESS NOTE ADULT - PROBLEM SELECTOR PLAN 1
Likely a result of pHTN c/b asthma as pt is using Atrovent vs cardiac etiology; low probability of infectious causes; RHC shows improved mPAP and CO from previous RHC in 2014  Cardiac etiology more likely. Patient was found to have possible paroxysmal SVT. Will monitor on tele. TTE ordered. Orthostatics negative. TSH ordered.   - f/u pulmonary recs from Dr. Yoon  - Atrovent nebulizer PRN  - supplemental O2 PRN; O2 > 90  - f/u TTE

## 2018-05-06 NOTE — PROGRESS NOTE ADULT - PROBLEM SELECTOR PLAN 4
Unclear etiology of tachycardia; underlying structural heart disease versus chronic PE versus anxiety or a combination of all 3  - trend HR on telemetry  - will discuss starting low dose beta blocker with cards if necessary  - patient may need holter monitor outpatient.

## 2018-05-06 NOTE — PROGRESS NOTE ADULT - PROBLEM SELECTOR PLAN 1
Likely a result of pHTN c/b asthma as pt is using Atrovent vs cardiac etiology; low probability of infectious causes; RHC shows improved mPAP and CO from previous RHC in 2014  Cardiac etiology more likely. Patient was found to have possible paroxysmal SVT. Will monitor on tele. TTE ordered. Orthostatics negative. TSH ordered.   - f/u pulmonary recs from Dr. Yoon  - Atrovent nebulizer PRN  - supplemental O2 PRN; O2 > 90  - f/u TTE Likely a result of pHTN c/b asthma as pt is using Atrovent vs cardiac etiology (symptomatic tachycardia); low probability of infectious causes; RHC shows improved mPAP and CO from previous RHC in 2014  Cardiac etiology more likely. Patient was found to have symptomatic tachycardia, suspect arrhythmia. Will monitor on tele. Found to have orthostatic hypotension ( laying -> 96 sitting)  - Pending TTE and TSH  - Atrovent nebulizer PRN  - supplemental O2 PRN  - cardiology following, recommendations appreciated  - pulmonology following, recommendations appreciated Unclear etiology of tachycardia; may be symptomatic cardiac arrythmia vs orthostatic hypotension (2/2 nifedipine?)   - TSH and TTE ordered  - trend HR on telemetry  - will discuss starting low dose beta blocker with cards if necessary

## 2018-05-06 NOTE — PROGRESS NOTE ADULT - SUBJECTIVE AND OBJECTIVE BOX
MEDICINE TRANSFER NOTE (CMA to Team 2):     Patient is a 37y old  Female who presents with a chief complaint of pulmonary artery HTN work up (02 May 2018 07:52)    HPI:  36 y/o female with PMHx of pulm htn w/ features of Class I/IV, GERD, asthma on home O2 2L at bedtime, chronic PE on xarelto, iron deficiency anemia sent in by pulmonologist Dr. Vivian Yoon to be admitted for further workup for worsening dyspnea on exertion.     Pr states that 3 days ago, she was taking a shower, sat on the bed when she was done, felt like she was going to faint, had palpitations, and passed out - this is the third time she has lost consciousness; no bowel/bladder incontinence/tongue biting/fasciculations, no post-ictal state, not related to change in position, but did vomit and stated that her whole body was shaking as per her .  She went to [facility] in [place] for evaluation where she states that her work up was negative; but after contacting Dr. Yoon, he advised her to come into the hospital for further work up.  Of note, pt had not been taking her Adempas and Nifedipine for multiple days, and this is really the only change in behavior or habit that she endorses.  Pt states that she has dyspnea at rest, but that it worsens with ambulation.  More importantly, she feels quite tachycardic when she walks and endorses significant anxiety about doing anything because she's afraid of her tachycardia, stating that she feels it's like a panic attack in some way, which exacerbates her underlying heart rate.  With respect to her asthma, she has a hx of chronic asthma with triggers known to be changes in weather, allergies, and any URI Sxs.  She does not wake up with an asthma attack, and her frequency of Atrovent inhaler use varies.  As of late, since she has had increased phlegm production and hoarseness, she is using her Atrovent nebulizer every night.  She endorses a cough as well over the last few days.  The cough is generally dry but occasionally productive with less than a teaspoon of clear mucus without blood or foul smell.  As per discussion, she denies PND and orthopnea; sleeps on 1-2 pillows at baseline.  Lastly, pt had some sharp midepigastric pain w/o radiation after receiving her medication in the ED, now abating.  No n/v/diarrhea/constipation at this time.  Last chest CT in 2016 showed enlarged main pulmonary artery w/ patchy bilateral ground glass opacities; last Echo in 2016 showed normal LV dimensions, hyperdynamic LV, flattening of IVS in systole/diastole c/w RV pressure overload, decreased RV systolic function w/ EF 82%. ROS otherwise negative.    Since being hospitalized, patient has undergone CTA that shows no PE as well as RHC showing improved mPAP (56mmHg) and CO from prior RHC in 2014. Patient with intermittent tachycardia so cardiology was consulted, suspect cardiac arrythmia and recommend telemetry monitoring. Patient now transferred to  for tele and care will be transferred to Team 2. Holding adempas over the weekend per pulm recs secondary to hypotension.      MEDICATIONS  (STANDING):  benzonatate 100 milliGRAM(s) Oral three times a day  ipratropium    for Nebulization 500 MICROGram(s) Nebulizer every 6 hours  multivitamin 1 Tablet(s) Oral daily  NIFEdipine XL 60 milliGRAM(s) Oral <User Schedule>  pantoprazole    Tablet 40 milliGRAM(s) Oral before breakfast  predniSONE   Tablet 10 milliGRAM(s) Oral daily  rivaroxaban 15 milliGRAM(s) Oral every 12 hours  spironolactone 25 milliGRAM(s) Oral daily    MEDICATIONS  (PRN):  acetaminophen   Tablet. 650 milliGRAM(s) Oral every 6 hours PRN mild to moderate pain  guaiFENesin    Syrup 100 milliGRAM(s) Oral every 6 hours PRN Cough    Allergies  No Known Allergies      REVIEW OF SYSTEMS:  CONSTITUTIONAL: No weakness, fevers or chills  EYES/ENT: No visual changes;  No vertigo or throat pain   NECK: No pain or stiffness  RESPIRATORY: No cough, wheezing, hemoptysis; No shortness of breath  CARDIOVASCULAR: No chest pain or palpitations  GASTROINTESTINAL: No abdominal or epigastric pain. No nausea, vomiting, or hematemesis; No diarrhea or constipation. No melena or hematochezia.  GENITOURINARY: No dysuria, frequency or hematuria  NEUROLOGICAL: No numbness or weakness  SKIN: No itching, burning, rashes, or lesions   All other review of systems is negative unless indicated above.      VITAL SIGNS:  T(C): 36.6 (05-06-18 @ 12:47), Max: 37 (05-06-18 @ 04:43)  T(F): 97.8 (05-06-18 @ 12:47), Max: 98.6 (05-06-18 @ 04:43)  HR: 90 (05-06-18 @ 12:47) (83 - 98)  BP: 93/64 (05-06-18 @ 12:47) (91/65 - 114/77)  BP(mean): --  RR: 18 (05-06-18 @ 12:47) (18 - 18)  SpO2: 99% (05-06-18 @ 12:47) (96% - 100%)  Wt(kg): --    PHYSICAL EXAM:  Constitutional: WDWN resting comfortably in bed; NAD  Head: NC/AT  Eyes: PERRL, EOMI, anicteric sclera  ENT: no nasal discharge; uvula midline, no oropharyngeal erythema or exudates; MMM  Neck: supple; no JVD or thyromegaly  Respiratory: CTA B/L; no W/R/R, no retractions  Cardiac: +S1/S2; RRR; no M/R/G; PMI non-displaced  Gastrointestinal: soft, NT/ND; no rebound or guarding; +BSx4  Genitourinary: normal external genitalia  Back: spine midline, no bony tenderness or step-offs; no CVAT B/L  Extremities: WWP, no clubbing or cyanosis; no peripheral edema  Musculoskeletal: NROM x4; no joint swelling, tenderness or erythema  Vascular: 2+ radial, femoral, DP/PT pulses B/L  Dermatologic: skin warm, dry and intact; no rashes, wounds, or scars  Lymphatic: no submandibular or cervical LAD  Neurologic: AAOx3; CNII-XII grossly intact; no focal deficits  Psychiatric: affect and characteristics of appearance, verbalizations, behaviors are appropriate      LABS:                        10.3   11.7  )-----------( 350      ( 06 May 2018 07:07 )             32.6     05-06    138  |  104  |  12  ----------------------------<  79  3.4<L>   |  22  |  1.08    Ca    8.7      06 May 2018 07:07  Phos  3.9     05-06  Mg     1.8     05-06    TPro  6.7  /  Alb  3.8  /  TBili  0.6  /  DBili  x   /  AST  11  /  ALT  8<L>  /  AlkPhos  36<L>  05-06    CAPILLARY BLOOD GLUCOSE                  CULTURES  RECENT CULTURES: MEDICINE TRANSFER NOTE (CMA to Team 2):     Patient is a 37y old  Female who presents with a chief complaint of pulmonary artery HTN work up (02 May 2018 07:52)    HPI:  38 y/o female with PMHx of pulm htn w/ features of Class I/IV, GERD, asthma on home O2 2L at bedtime, chronic PE on xarelto, iron deficiency anemia sent in by pulmonologist Dr. Vivian Yoon to be admitted for further workup for worsening dyspnea on exertion.     Pr states that 3 days ago, she was taking a shower, sat on the bed when she was done, felt like she was going to faint, had palpitations, and passed out - this is the third time she has lost consciousness; no bowel/bladder incontinence/tongue biting/fasciculations, no post-ictal state, not related to change in position, but did vomit and stated that her whole body was shaking as per her .  She went to [facility] in [place] for evaluation where she states that her work up was negative; but after contacting Dr. Yoon, he advised her to come into the hospital for further work up.  Of note, pt had not been taking her Adempas and Nifedipine for multiple days, and this is really the only change in behavior or habit that she endorses.  Pt states that she has dyspnea at rest, but that it worsens with ambulation.  More importantly, she feels quite tachycardic when she walks and endorses significant anxiety about doing anything because she's afraid of her tachycardia, stating that she feels it's like a panic attack in some way, which exacerbates her underlying heart rate.  With respect to her asthma, she has a hx of chronic asthma with triggers known to be changes in weather, allergies, and any URI Sxs.  She does not wake up with an asthma attack, and her frequency of Atrovent inhaler use varies.  As of late, since she has had increased phlegm production and hoarseness, she is using her Atrovent nebulizer every night.  She endorses a cough as well over the last few days.  The cough is generally dry but occasionally productive with less than a teaspoon of clear mucus without blood or foul smell.  As per discussion, she denies PND and orthopnea; sleeps on 1-2 pillows at baseline.  Lastly, pt had some sharp midepigastric pain w/o radiation after receiving her medication in the ED, now abating.  No n/v/diarrhea/constipation at this time.  Last chest CT in 2016 showed enlarged main pulmonary artery w/ patchy bilateral ground glass opacities; last Echo in 2016 showed normal LV dimensions, hyperdynamic LV, flattening of IVS in systole/diastole c/w RV pressure overload, decreased RV systolic function w/ EF 82%. ROS otherwise negative.    Since being hospitalized, patient has undergone CTA that shows no PE as well as RHC showing improved mPAP (56mmHg) and CO from prior RHC in 2014. Patient with intermittent tachycardia so cardiology was consulted, suspect cardiac arrythmia and recommend telemetry monitoring. Patient now transferred to  for tele and care will be transferred to Team 2. Holding adempas over the weekend per pulm recs secondary to hypotension.      MEDICATIONS  (STANDING):  benzonatate 100 milliGRAM(s) Oral three times a day  ipratropium    for Nebulization 500 MICROGram(s) Nebulizer every 6 hours  multivitamin 1 Tablet(s) Oral daily  NIFEdipine XL 60 milliGRAM(s) Oral <User Schedule>  pantoprazole    Tablet 40 milliGRAM(s) Oral before breakfast  predniSONE   Tablet 10 milliGRAM(s) Oral daily  rivaroxaban 15 milliGRAM(s) Oral every 12 hours  spironolactone 25 milliGRAM(s) Oral daily    MEDICATIONS  (PRN):  acetaminophen   Tablet. 650 milliGRAM(s) Oral every 6 hours PRN mild to moderate pain  guaiFENesin    Syrup 100 milliGRAM(s) Oral every 6 hours PRN Cough    Allergies  No Known Allergies      REVIEW OF SYSTEMS:  CONSTITUTIONAL: No weakness, fevers or chills  EYES/ENT: No visual changes;  No vertigo or throat pain   NECK: No pain or stiffness  RESPIRATORY: +SOB; No cough, wheezing, hemoptysis  CARDIOVASCULAR: +Palpitations; No chest pain  GASTROINTESTINAL: No abdominal or epigastric pain. No nausea, vomiting, or hematemesis; No diarrhea or constipation. No melena or hematochezia.  GENITOURINARY: No dysuria, frequency or hematuria  NEUROLOGICAL: No numbness or weakness  SKIN: No itching, burning, rashes, or lesions   All other review of systems is negative unless indicated above.      VITAL SIGNS:  T(C): 36.6 (05-06-18 @ 12:47), Max: 37 (05-06-18 @ 04:43)  T(F): 97.8 (05-06-18 @ 12:47), Max: 98.6 (05-06-18 @ 04:43)  HR: 90 (05-06-18 @ 12:47) (83 - 98)  BP: 93/64 (05-06-18 @ 12:47) (91/65 - 114/77)  BP(mean): --  RR: 18 (05-06-18 @ 12:47) (18 - 18)  SpO2: 99% (05-06-18 @ 12:47) (96% - 100%)  Wt(kg): --    PHYSICAL EXAM:  Constitutional: Young female, NAD, resting comfortably in bed  Eyes: conjunctiva and sclera clear  Respiratory: Normal work of breathing, CTA B/L; no W/R/R, no retractions  Cardiac: Normal rate and regular rhythm, +S1/S2; no M/R/G  Gastrointestinal: soft, NT/ND; no rebound or guarding; +BSx4  Extremities: well perfused, no clubbing or cyanosis; no peripheral edema; 2+ peripheral pulses  Dermatologic: skin warm, dry and intact; no rashes, wounds, or scars  Neurologic: AAOx3; responding to questions appropriately, moving all extremities        LABS:                        10.3   11.7  )-----------( 350      ( 06 May 2018 07:07 )             32.6     05-06    138  |  104  |  12  ----------------------------<  79  3.4<L>   |  22  |  1.08    Ca    8.7      06 May 2018 07:07  Phos  3.9     05-06  Mg     1.8     05-06    TPro  6.7  /  Alb  3.8  /  TBili  0.6  /  DBili  x   /  AST  11  /  ALT  8<L>  /  AlkPhos  36<L>  05-06

## 2018-05-07 DIAGNOSIS — R55 SYNCOPE AND COLLAPSE: ICD-10-CM

## 2018-05-07 DIAGNOSIS — I26.99 OTHER PULMONARY EMBOLISM WITHOUT ACUTE COR PULMONALE: ICD-10-CM

## 2018-05-07 LAB
ANION GAP SERPL CALC-SCNC: 16 MMOL/L — SIGNIFICANT CHANGE UP (ref 5–17)
BUN SERPL-MCNC: 14 MG/DL — SIGNIFICANT CHANGE UP (ref 7–23)
CALCIUM SERPL-MCNC: 8.9 MG/DL — SIGNIFICANT CHANGE UP (ref 8.4–10.5)
CHLORIDE SERPL-SCNC: 101 MMOL/L — SIGNIFICANT CHANGE UP (ref 96–108)
CO2 SERPL-SCNC: 20 MMOL/L — LOW (ref 22–31)
CREAT SERPL-MCNC: 1.07 MG/DL — SIGNIFICANT CHANGE UP (ref 0.5–1.3)
GLUCOSE SERPL-MCNC: 81 MG/DL — SIGNIFICANT CHANGE UP (ref 70–99)
MAGNESIUM SERPL-MCNC: 2.4 MG/DL — SIGNIFICANT CHANGE UP (ref 1.6–2.6)
PHOSPHATE SERPL-MCNC: 4.3 MG/DL — SIGNIFICANT CHANGE UP (ref 2.5–4.5)
POTASSIUM SERPL-MCNC: 3.7 MMOL/L — SIGNIFICANT CHANGE UP (ref 3.5–5.3)
POTASSIUM SERPL-SCNC: 3.7 MMOL/L — SIGNIFICANT CHANGE UP (ref 3.5–5.3)
SODIUM SERPL-SCNC: 137 MMOL/L — SIGNIFICANT CHANGE UP (ref 135–145)
TSH SERPL-MCNC: 4.09 UIU/ML — SIGNIFICANT CHANGE UP (ref 0.27–4.2)

## 2018-05-07 PROCEDURE — 99233 SBSQ HOSP IP/OBS HIGH 50: CPT

## 2018-05-07 PROCEDURE — 99232 SBSQ HOSP IP/OBS MODERATE 35: CPT | Mod: GC

## 2018-05-07 PROCEDURE — 99233 SBSQ HOSP IP/OBS HIGH 50: CPT | Mod: GC

## 2018-05-07 RX ORDER — RIVAROXABAN 15 MG-20MG
1 KIT ORAL
Qty: 0 | Refills: 0 | COMMUNITY
Start: 2018-05-07

## 2018-05-07 RX ORDER — ACETAMINOPHEN 500 MG
2 TABLET ORAL
Qty: 0 | Refills: 0 | DISCHARGE
Start: 2018-05-07

## 2018-05-07 RX ADMIN — Medication 100 MILLIGRAM(S): at 21:22

## 2018-05-07 RX ADMIN — Medication 100 MILLIGRAM(S): at 14:13

## 2018-05-07 RX ADMIN — PANTOPRAZOLE SODIUM 40 MILLIGRAM(S): 20 TABLET, DELAYED RELEASE ORAL at 05:58

## 2018-05-07 RX ADMIN — Medication 500 MICROGRAM(S): at 17:56

## 2018-05-07 RX ADMIN — RIVAROXABAN 15 MILLIGRAM(S): KIT at 08:11

## 2018-05-07 RX ADMIN — SPIRONOLACTONE 25 MILLIGRAM(S): 25 TABLET, FILM COATED ORAL at 06:00

## 2018-05-07 RX ADMIN — RIVAROXABAN 15 MILLIGRAM(S): KIT at 17:56

## 2018-05-07 RX ADMIN — Medication 500 MICROGRAM(S): at 05:57

## 2018-05-07 RX ADMIN — Medication 500 MICROGRAM(S): at 23:59

## 2018-05-07 RX ADMIN — Medication 1 TABLET(S): at 11:47

## 2018-05-07 RX ADMIN — Medication 10 MILLIGRAM(S): at 05:58

## 2018-05-07 RX ADMIN — Medication 100 MILLIGRAM(S): at 05:57

## 2018-05-07 NOTE — PROGRESS NOTE ADULT - ASSESSMENT
37 F w/ pulmonary HTN w/ Class I/IV features (dx in 2014), chronic asthma, chronic PE on Xarelto, GERD p/w dyspnea at rest now s/p RHC found to have improvement in mPAP and cardiac output restarted on home pHTN meds now found to have persistent hypotension with episodic symptomatic tachycardia. Transferred to [facility] for tele monitoring.

## 2018-05-07 NOTE — DISCHARGE NOTE ADULT - CARE PROVIDERS DIRECT ADDRESSES
[email or web address]

## 2018-05-07 NOTE — PROGRESS NOTE ADULT - SUBJECTIVE AND OBJECTIVE BOX
Esme Person MD ([id number])  Pager: [phone]  7AM-7PM  For emergent questions between 7PM and 7AM an on weekends after 12PM, please page [phone].      Patient is a 37y old  Female who presents with a chief complaint of pulmonary artery HTN work up (02 May 2018 07:52)      SUBJECTIVE / OVERNIGHT EVENTS:  No events overnight. Patient with HR 60-90s overnight. No episodes of palpitations or SOB.     MEDICATIONS  (STANDING):  benzonatate 100 milliGRAM(s) Oral three times a day  ipratropium    for Nebulization 500 MICROGram(s) Nebulizer every 6 hours  multivitamin 1 Tablet(s) Oral daily  NIFEdipine XL 60 milliGRAM(s) Oral <User Schedule>  pantoprazole    Tablet 40 milliGRAM(s) Oral before breakfast  predniSONE   Tablet 10 milliGRAM(s) Oral daily  rivaroxaban 15 milliGRAM(s) Oral every 12 hours  spironolactone 25 milliGRAM(s) Oral daily    MEDICATIONS  (PRN):  acetaminophen   Tablet. 650 milliGRAM(s) Oral every 6 hours PRN mild to moderate pain  guaiFENesin    Syrup 100 milliGRAM(s) Oral every 6 hours PRN Cough      Vital Signs:  Vital Signs Last 24 Hrs  T(C): 37.1 (07 May 2018 11:33), Max: 37.1 (07 May 2018 11:33)  T(F): 98.7 (07 May 2018 11:33), Max: 98.7 (07 May 2018 11:33)  HR: 120 (07 May 2018 13:49) (83 - 120)  BP: 106/73 (07 May 2018 13:49) (90/59 - 106/73)  BP(mean): --  RR: 18 (07 May 2018 13:49) (18 - 18)  SpO2: 95% (07 May 2018 13:49) (95% - 99%)  Daily     Daily Weight in k.9 (07 May 2018 11:34)  CAPILLARY BLOOD GLUCOSE        I&O's Summary    06 May 2018 07:01  -  07 May 2018 07:00  --------------------------------------------------------  IN: 1070 mL / OUT: 0 mL / NET: 1070 mL    07 May 2018 07:01  -  07 May 2018 14:41  --------------------------------------------------------  IN: 480 mL / OUT: 0 mL / NET: 480 mL        PHYSICAL EXAM  Constitutional: Young female, NAD, resting comfortably in bed  Eyes: conjunctiva and sclera clear  Mouth: MMM, no oral lesions  Respiratory: Normal work of breathing, CTA B/L; no W/R/R, no retractions  Cardiac: Normal rate and regular rhythm, +S1/S2; no M/R/G  Gastrointestinal: soft, NT/ND; no rebound or guarding; +BSx4  Extremities: well perfused, no clubbing or cyanosis; no peripheral edema; 2+ peripheral pulses  Dermatologic: skin warm, dry and intact; no rashes, wounds, or scars  Neurologic: AAOx3; responding to questions appropriately, moving all extremities      LABS:                        10.3   11.7  )-----------( 350      ( 06 May 2018 07:07 )             32.6     05-07    137  |  101  |  14  ----------------------------<  81  3.7   |  20<L>  |  1.07    Ca    8.9      07 May 2018 06:44  Phos  4.3     05-07  Mg     2.4     05-07    TPro  6.7  /  Alb  3.8  /  TBili  0.6  /  DBili  x   /  AST  11  /  ALT  8<L>  /  AlkPhos  36<L>  05-06              RADIOLOGY & ADDITIONAL TESTS:

## 2018-05-07 NOTE — DIETITIAN INITIAL EVALUATION ADULT. - ENERGY NEEDS
Ht: 66“, Wt: 213.8lbs, BMI: 34.5kg/m2, IBW: 130lbs (+/-10%)  Pertinent Information: 37 F w/ pulmonary HTN w/ Class I/IV features (dx in 2014), chronic asthma, chronic PE on Xarelto, GERD p/w dyspnea at rest now S/P RHC found to have improvement in mPAP and cardiac output restarted on home pulmHTN meds now found to have persistent hypotension with symptomatic tachycardia. Transferred to [facility] for tele monitoring.  no edema or pressure injury

## 2018-05-07 NOTE — PROGRESS NOTE ADULT - ASSESSMENT
38 y/o female with PMHx of pulm htn w/ features of WHO Group I and IV.  Adempas was being held in the setting of hypotension    -- hold Nifedipine  -- please give first dose of Adempas this evening  -- monitor BPs, if SBP <90, would give midodrine 10 mg PO    pt seen and examined with  Dr Yoon    plan discussed with patient and primary team    Kira Holman  Pulmonary Fellow  p [phone]

## 2018-05-07 NOTE — DIETITIAN INITIAL EVALUATION ADULT. - PROBLEM SELECTOR PLAN 5
Hx of iron deficiency anemia; no s/s of bleeding  - c/w ferrous sulfate 325  - trend H/H via CBC  - transfuse if < 7; low probability of bleeding

## 2018-05-07 NOTE — PROGRESS NOTE ADULT - PROBLEM SELECTOR PLAN 1
No events on telemetry. Recommend MCOT monitor for 30 days as an outpatient and follow up with Dr. Dangelo who is her primary cardiologist.   TTE pending for evaluation of LV function and r/o valvular abnormality.  One set of orthostatics obtained which are negative. Continue telemetry monitoring.

## 2018-05-07 NOTE — PROGRESS NOTE ADULT - SUBJECTIVE AND OBJECTIVE BOX
Interval Events:  Patient seen and examined at bedside this morning.   Adempas held over the weekend in the setting of hypotsnsion      REVIEW OF SYSTEMS:  Constitutional: relative hypotension  CV:  + palpitations with ambulation  Resp:  stable  GI: no abd pain      [x ] All other systems negative  [ ] Unable to assess ROS because ________    OBJECTIVE:  T(C): 37.1 (05-07-18 @ 11:33), Max: 37.1 (05-07-18 @ 11:33)  HR: 120 (05-07-18 @ 13:49) (83 - 120)  BP: 106/73 (05-07-18 @ 13:49) (90/59 - 106/73)  RR: 18 (05-07-18 @ 13:49) (18 - 18)  SpO2: 95% (05-07-18 @ 13:49) (95% - 99%)      05-06-18 @ 07:01  -  05-07-18 @ 07:00  --------------------------------------------------------  IN: 1070 mL / OUT: 0 mL / NET: 1070 mL    05-07-18 @ 07:01  -  05-07-18 @ 19:02  --------------------------------------------------------  IN: 480 mL / OUT: 0 mL / NET: 480 mL        PHYSICAL EXAM:  General: Well appearing Female. No apparent distress  HEENT:   Mucous membranes are moist.  Neck: Supple. No JVD  Respiratory: clear  Cardiovascular: RRR, no m/r/g  Abdomen: Soft, non-tender, non-distended.   Extremities: No lower extremity edema.  Skin: Warm, dry, no rash.   Neurological: CNII-XII grossly intact.   Psychiatry: appropriate mood and affect.     HOSPITAL MEDICATIONS:  MEDICATIONS  (STANDING):  Adempas 0.5 milliGRAM(s) 0.5 milliGRAM(s) Oral three times a day  benzonatate 100 milliGRAM(s) Oral three times a day  ipratropium    for Nebulization 500 MICROGram(s) Nebulizer every 6 hours  multivitamin 1 Tablet(s) Oral daily  pantoprazole    Tablet 40 milliGRAM(s) Oral before breakfast  predniSONE   Tablet 10 milliGRAM(s) Oral daily  rivaroxaban 15 milliGRAM(s) Oral every 12 hours  spironolactone 25 milliGRAM(s) Oral daily    MEDICATIONS  (PRN):  acetaminophen   Tablet. 650 milliGRAM(s) Oral every 6 hours PRN mild to moderate pain  guaiFENesin    Syrup 100 milliGRAM(s) Oral every 6 hours PRN Cough      LABS:                        10.3   11.7  )-----------( 350      ( 06 May 2018 07:07 )             32.6     05-07    137  |  101  |  14  ----------------------------<  81  3.7   |  20<L>  |  1.07    Ca    8.9      07 May 2018 06:44  Phos  4.3     05-07  Mg     2.4     05-07    TPro  6.7  /  Alb  3.8  /  TBili  0.6  /  DBili  x   /  AST  11  /  ALT  8<L>  /  AlkPhos  36<L>  05-06        RADIOLOGY:  [ x ] Reviewed and interpreted by me Interval Events:  Patient seen and examined at bedside this morning.   Adempas held over the weekend in the setting of hypotsnsion      REVIEW OF SYSTEMS:  Constitutional: relative hypotension  CV:  + palpitations with ambulation  Resp:  stable  GI: no abd pain      [x ] All other systems negative  [ ] Unable to assess ROS because ________    OBJECTIVE:  T(C): 37.1 (05-07-18 @ 11:33), Max: 37.1 (05-07-18 @ 11:33)  HR: 120 (05-07-18 @ 13:49) (83 - 120)  BP: 106/73 (05-07-18 @ 13:49) (90/59 - 106/73)  RR: 18 (05-07-18 @ 13:49) (18 - 18)  SpO2: 95% (05-07-18 @ 13:49) (95% - 99%)      05-06-18 @ 07:01  -  05-07-18 @ 07:00  --------------------------------------------------------  IN: 1070 mL / OUT: 0 mL / NET: 1070 mL    05-07-18 @ 07:01  -  05-07-18 @ 19:02  --------------------------------------------------------  IN: 480 mL / OUT: 0 mL / NET: 480 mL        PHYSICAL EXAM:  General: Well appearing Female. No apparent distress  HEENT:   Mucous membranes are moist.  Neck: Supple. No JVD  Respiratory: clear  Cardiovascular: RRR, no m/r/g  Abdomen: Soft, non-tender, non-distended.   Extremities: No lower extremity edema.  Skin: Warm, dry, no rash.   Neurological: CNII-XII grossly intact.   Psychiatry: appropriate mood and affect.     HOSPITAL MEDICATIONS:  MEDICATIONS  (STANDING):  Adempas 0.5 milliGRAM(s) 0.5 milliGRAM(s) Oral three times a day  benzonatate 100 milliGRAM(s) Oral three times a day  ipratropium    for Nebulization 500 MICROGram(s) Nebulizer every 6 hours  multivitamin 1 Tablet(s) Oral daily  pantoprazole    Tablet 40 milliGRAM(s) Oral before breakfast  predniSONE   Tablet 10 milliGRAM(s) Oral daily  rivaroxaban 15 milliGRAM(s) Oral every 12 hours  spironolactone 25 milliGRAM(s) Oral daily    MEDICATIONS  (PRN):  acetaminophen   Tablet. 650 milliGRAM(s) Oral every 6 hours PRN mild to moderate pain  guaiFENesin    Syrup 100 milliGRAM(s) Oral every 6 hours PRN Cough    < from: Cardiac Cath Lab - Adult (05.04.18 @ 15:14) >  Pressures:  -- Pulmonary Artery (S/D/M): 88/31/56  Pressures:  -- Pulmonary Capillary Wedge: 10/10/6  Pressures:  -- Right Atrium (a/v/M): 13/4/4  Pressures:  -- Right Ventricle (s/edp): 90/14/--    O2 Sats:  -- AO: 9.8/97/12.93  O2 Sats:  -- PA: 9.8/61.2/8.16  Outputs:  Baseline  Outputs:  -- OUTPUTS: CO by Xenia: 5.76  Outputs:  < end of copied text >    < from: Transthoracic Echocardiogram (08.17.16 @ 22:14) >  1. Normal left ventricular internal dimensions and wall  thicknesses.  2. Hyperdynamic left ventricle. Flattening of the  interventricular septum in both systole and diastole is  consistent with right ventricular pressure overload.  3. Decreased right ventricular systolic function.  4. Trace pericardial effusion.    < end of copied text >    LABS:                        10.3   11.7  )-----------( 350      ( 06 May 2018 07:07 )             32.6     05-07    137  |  101  |  14  ----------------------------<  81  3.7   |  20<L>  |  1.07    Ca    8.9      07 May 2018 06:44  Phos  4.3     05-07  Mg     2.4     05-07    TPro  6.7  /  Alb  3.8  /  TBili  0.6  /  DBili  x   /  AST  11  /  ALT  8<L>  /  AlkPhos  36<L>  05-06        RADIOLOGY:  [ x ] Reviewed and interpreted by me

## 2018-05-07 NOTE — PROGRESS NOTE ADULT - PROBLEM SELECTOR PLAN 4
Pt has chronic asthma, recent increase in Atrovent nebulizer use as the weather is changing and she is having mild sputum production; not actively wheezing or in respiratory distress  - started on prednisone 5 days ago for asthma exacerbation, will d/c today  - supplemental O2 PRN; O2 > 92   - Atrovent PRN

## 2018-05-07 NOTE — DISCHARGE NOTE ADULT - CARE PLAN
Principal Discharge DX:	Tachycardia  Secondary Diagnosis:	Pulmonary hypertension Principal Discharge DX:	Tachycardia  Goal:	Improvement in heart rate  Assessment and plan of treatment:	You were diagnosed with sinus tachycardia on exertion. You were taken off nifedipine and started on coreg. You will be placed on a holter monitor for 30 days to further evaluate your heart rhythm on a day to day basis. You should follow up with Dr. Dangelo within 1-2 weeks of discharge.  Secondary Diagnosis:	Pulmonary hypertension  Assessment and plan of treatment:	You had a right heart catheterization that showed improved pulmonary artery pressure (56mmHg) and cardiac output compared to 2014. You should continuing taking adempas as prescribed. You should also continue taking midodrine to help increase your blood pressure. Please follow up with Dr. Yoon within 1-2 weeks of discharge. Principal Discharge DX:	Tachycardia  Goal:	Improvement in heart rate  Assessment and plan of treatment:	You were diagnosed with sinus tachycardia (fast heart rate) on exertion likely secondary to right ventricle pressure overload secondary to pulmonary hypertension as seen on TTE (ultrasound of your heart). You were taken off nifedipine and started on coreg. You will be placed on a holter monitor for 30 days to further evaluate your heart rhythm on a day to day basis. You should follow up with Dr. Dangelo within 1-2 weeks of discharge.  Secondary Diagnosis:	Pulmonary hypertension  Assessment and plan of treatment:	You had a right heart catheterization that showed improved pulmonary artery pressure (56mmHg) and cardiac output compared to 2014. However, your tachycardia is likely related to right ventricle pressure overload due to your pulmonary hypertension. You were started on flolan to help reduce your pulmonary pressures. You should continuing taking adempas as prescribed. You should also continue taking midodrine to help increase your blood pressure. Please follow up with Dr. Yoon within 1-2 weeks of discharge. Principal Discharge DX:	Pulmonary hypertension  Goal:	Be evaluated at [place] for potential Lung Transplant  Assessment and plan of treatment:	You had a right heart catheterization that showed improved pulmonary artery pressure (56mmHg) and cardiac output compared to 2014. However, your tachycardia is likely related to right ventricle pressure overload due to your pulmonary hypertension. You were started on flolan to help reduce your pulmonary pressures. You should continuing taking adempas as prescribed. You should also continue taking midodrine to help increase your blood pressure. Please follow up with Dr. Yoon within 1-2 weeks of discharge.  Secondary Diagnosis:	Chronic respiratory failure with hypoxia  Assessment and plan of treatment:	Your shortness of breath is due to your severe pulmonary hypertension. You may need a lung transplant for symptom relief  Secondary Diagnosis:	Hypotension, unspecified hypotension type  Assessment and plan of treatment:	Your blood pressures were low and you required medications to keep them elevated.  Secondary Diagnosis:	Tachycardia  Assessment and plan of treatment:	You were diagnosed with sinus tachycardia (fast heart rate) on exertion likely secondary to right ventricle pressure overload secondary to pulmonary hypertension as seen on TTE (ultrasound of your heart). You were taken off nifedipine and started on coreg. You will be placed on a holter monitor for 30 days to further evaluate your heart rhythm on a day to day basis. You should follow up with Dr. aDngelo within 1-2 weeks of discharge.

## 2018-05-07 NOTE — PROGRESS NOTE ADULT - PROBLEM SELECTOR PLAN 2
Likely a result of pHTN c/b asthma vs cardiac etiology (symptomatic tachycardia); low probability of infectious causes; RHC shows improved mPAP and CO from previous RHC in 2014  - Orthostatics negative  - Pending TTE  - Atrovent nebulizer PRN  - supplemental O2 PRN  - cardiology following, recommendations appreciated  - pulmonology following, recommendations appreciated

## 2018-05-07 NOTE — DISCHARGE NOTE ADULT - SECONDARY DIAGNOSIS.
Pulmonary hypertension Chronic respiratory failure with hypoxia Hypotension, unspecified hypotension type Tachycardia

## 2018-05-07 NOTE — PROGRESS NOTE ADULT - SUBJECTIVE AND OBJECTIVE BOX
PROGRESS NOTE  Reason for follow up: syncope  Overnight: No new events.   Update: Patient sitting in bed. No complaints.     Subjective: "I feel good"  Complains of: none  Review of symptoms: Cardiac:  No chest pain. No dyspnea. No palpitations.  Respiratory:no cough. No dyspnea  Gastrointestinal: No diarrhea. No abdominal pain. No bleeding.     Past medical history: No updates.   Chronic conditions:  Pulmonary Hypertension: controlled. GERD: Stable. pulmonary embolism: Stable;    	  Vitals:  T(C): 37.1 (05-07-18 @ 11:33), Max: 37.1 (05-07-18 @ 11:33)  HR: 120 (05-07-18 @ 13:49) (83 - 120)  BP: 106/73 (05-07-18 @ 13:49) (90/59 - 106/73)  RR: 18 (05-07-18 @ 13:49) (18 - 18)  SpO2: 95% (05-07-18 @ 13:49) (95% - 99%)  Wt(kg): --  I&O's Summary    06 May 2018 07:01  -  07 May 2018 07:00  --------------------------------------------------------  IN: 1070 mL / OUT: 0 mL / NET: 1070 mL    07 May 2018 07:01  -  07 May 2018 14:31  --------------------------------------------------------  IN: 480 mL / OUT: 0 mL / NET: 480 mL          PHYSICAL EXAM:  Appearance: Comfortable. No acute distress  HEENT:  Head and neck: Atraumatic. Normocephalic.  Normal oral mucosa, PERRL, Neck is supple. No JVD, No carotid bruit.   Neurologic: A & O x 3, no focal deficits. EOMI   Lymphatic: No cervical lymphadenopathy  Cardiovascular: Normal S1 S2, No murmur, rubs/gallops. No JVD, No edema  Respiratory: Lungs clear to auscultation  Gastrointestinal:  Soft, Non-tender, + BS  Lower Extremities: No edema  Psychiatry: Patient is calm. No agitation. Mood & affect appropriate  Skin: No rashes/ ecchymoses/cyanosis/ulcers visualized on the face, hands or feet.    CURRENT MEDICATIONS:  NIFEdipine XL 60 milliGRAM(s) Oral <User Schedule>  spironolactone 25 milliGRAM(s) Oral daily    benzonatate  ipratropium    for Nebulization  pantoprazole    Tablet  predniSONE   Tablet  multivitamin  rivaroxaban      LABS:	 	                              10.3   11.7  )-----------( 350      ( 06 May 2018 07:07 )             32.6     05-07    137  |  101  |  14  ----------------------------<  81  3.7   |  20<L>  |  1.07    Ca    8.9      07 May 2018 06:44  Phos  4.3     05-07  Mg     2.4     05-07  < from: Cardiac Cath Lab - Adult (05.04.18 @ 15:14) >   Hemodynamic assessment demonstrates normal pulmonary  capillary wedge pressure. There is severe pulmonary hypertension.    < end of copied text >    TPro  6.7  /  Alb  3.8  /  TBili  0.6  /  DBili  x   /  AST  11  /  ALT  8<L>  /  AlkPhos  36<L>  05-06    proBNP:   Lipid Profile:   HgA1c: TSH: Thyroid Stimulating Hormone, Serum: 4.09 uIU/mL      TELEMETRY: Reviewed no events   ECG:  Reviewed by me. sinus rhythm, RVH	    DIAGNOSTIC TESTING:  [ ] Echocardiogram: pending  [ ]  Catheterization:    [ ] CAT scan  < from: CT Angio Chest w/ IV Cont (05.02.18 @ 21:09) >  No evidence of pulmonary embolism.    Pulmonary hypertension with enlarged main pulmonary artery, right atrium   and ventricle. There is thickening of the right ventricular free wall    Interval resolution of previously seen airspace opacities,with residual   scattered bilateral groundglass opacities in a mosaic pattern, likely   secondary to pulmonary hypertension.    < end of copied text >    OTHER:

## 2018-05-07 NOTE — DIETITIAN INITIAL EVALUATION ADULT. - OTHER INFO
Nutrition assessment for length of stay. Pt reports continued good po intake. No N+V. Pt reports her last BM was PTA but denies feeling constipated and declines stool softener. RD encouraged adequate fiber and fluids to help with bowel movements. No chewing or swallowing difficulties. No known food allergies.

## 2018-05-07 NOTE — PROGRESS NOTE ADULT - ATTENDING COMMENTS
Agree with above.    1. Palpitations.  -reviewed telemetry, currently sinus tachycardia.  encouraged patient to ambulate to see if can capture any arrhythmia.  -if not will place 30 day event monitor per cards and follow up with Dr. Dangelo as outpatietn..    2. Pulmonary HTN  group I/IV  -follow up Dr. Yoon's assessment today as patietn on nifedipine but droppign pressures holding other agents.    -defer to Dr. Yoon's expertise re: medication management for pulmonary HTN.    Pending pulm assessment, D/C planning tomorrow if no infusion set up for pulmonary HTN.

## 2018-05-07 NOTE — PROGRESS NOTE ADULT - PROBLEM SELECTOR PLAN 3
Pt was dx'd in 2014 with pulmonary HTN w/ features of Class I and Class IV as per a pulmonary angiogram done at [facility]. S/p RHC 5/4 showing improved mPAP (56mmHg) and CO as compared to 2014  - c/w Nifedipine ER 60 mg BID with BP parameters   - Hold adempas for now in setting of hypotension  - Pulmonology following, recommendations appreciated

## 2018-05-07 NOTE — DISCHARGE NOTE ADULT - HOSPITAL COURSE
37 F w/ pulmonary HTN w/ Class I/IV features (dx in 2014), chronic asthma, chronic PE on Xarelto, GERD p/w dyspnea at rest, episodic palpitations and syncope. Patient had RHC showing improvement in mPAP (56mmHg) and cardiac output from 2014, arguing against symptoms being a result of worsening pulmonary hypertension. Patient was noted to have episodic tachycardia on ambulation and was monitored on telemetry without any significant events. Orthostatic vitals were within normal limits. Cardiology was consulted and recommended a TTE as well as an outpatient holter monitor. Pulmonolgy (Dr. Yoon) was consulted and recommended discontinuing nifedipine due to hypotension and re-starting Adempas. 37 F w/ pulmonary HTN w/ Class I/IV features (dx in 2014), chronic asthma, chronic PE on Xarelto, GERD p/w dyspnea at rest, episodic palpitations and syncope. Patient had RHC showing improvement in mPAP (56mmHg) and cardiac output from 2014, arguing against symptoms being a result of worsening pulmonary hypertension. Patient was noted to have episodic tachycardia on ambulation and was monitored on telemetry with notable episodes of sinus tachycardia to 150s during ambulation (without hypoxia or other symptoms). Orthostatic vitals were within normal limits. Cardiology was consulted and recommended a TTE as well as an outpatient holter monitor. Pulmonolgy (Dr. Yoon) was consulted and recommended discontinuing nifedipine due to hypotension and re-starting Adempas with midodrine. Patient was also placed on coreg to help control heart rate. Patient will be discharge with [facility] holter monitor for 30 days and should follow up with Dr. Dangelo on **** and Dr. Yoon on ***. 37 F w/ pulmonary HTN w/ Class I/IV features (dx in 2014), chronic asthma, chronic PE on Xarelto, GERD p/w dyspnea at rest, episodic palpitations and syncope. Patient had RHC showing improvement in mPAP (56mmHg) and cardiac output from 2014, arguing against symptoms being a result of worsening pulmonary hypertension. Patient was noted to have episodic tachycardia on ambulation and was monitored on telemetry with notable episodes of sinus tachycardia to 150s during ambulation (without hypoxia or other symptoms). Orthostatic vitals were within normal limits. Cardiology was consulted and recommended a TTE as well as an outpatient holter monitor. TTE was performed and showed RV enlargement and RV dysfunction, flattening of the septum c/w RV pressure overload. Therefore, it was determined that your tachycardia was a result of your pulmonary hypertension causing severe pressure overload on your right heart. Pulmonolgy (Dr. Yoon) was consulted and recommended discontinuing nifedipine due to hypotension and re-starting Adempas with midodrine. You were also started on Flolan based on pulmonary recommendations. Patient was also placed on coreg to help control heart rate. Patient will be discharge with [facility] [place] holter monitor for 30 days and should follow up with Dr. Dangelo on **** and Dr. Yoon on ***. 37 F w/ pulmonary HTN w/ Class I/IV features (dx in 2014), chronic asthma, chronic PE on Xarelto, GERD p/w dyspnea at rest, episodic palpitations and syncope. Patient had RHC showing improvement in mPAP (56mmHg) and cardiac output from 2014, arguing against symptoms being a result of worsening pulmonary hypertension. Patient was noted to have episodic tachycardia on ambulation and was monitored on telemetry with notable episodes of sinus tachycardia to 150s during ambulation (without hypoxia or other symptoms). Orthostatic vitals were within normal limits. Cardiology was consulted and recommended a TTE as well as an outpatient holter monitor. TTE was performed and showed RV enlargement and RV dysfunction, flattening of the septum c/w RV pressure overload. Therefore, it was determined that your tachycardia was a result of your pulmonary hypertension causing severe pressure overload on your right heart. Pulmonolgy (Dr. Yoon) was consulted and recommended discontinuing nifedipine due to hypotension and re-starting Adempas with midodrine. You were also started on Flolan based on pulmonary recommendations. Patient was admitted to the MICU for hemodynamic monitoring due to hypotension requiring pressors exacerbated by Flolan. Flolan was eventually d/c'd due to inefficacy. Titration off of pressors with midodrine assistance was attempted but was complicated by bradycardia presumably from the midodrine. Ultimately, patient was transferred to [place] for lung transplant evaluation.

## 2018-05-07 NOTE — DISCHARGE NOTE ADULT - CARE PROVIDER_API CALL
Vivian Yoon), Critical Care Medicine; Internal Medicine; Pulmonary Disease  [address]  [address]  [place] [address]  Phone: [phone]  Fax: [phone] Vivian Yoon), Critical Care Medicine; Internal Medicine; Pulmonary Disease  [address]  [address]  [place] [address]  Phone: [phone]  Fax: [phone]    Vishal Dangelo), Cardiovascular Disease  [address] [place] [address]  Phone: [phone]  Fax: [phone]

## 2018-05-07 NOTE — DIETITIAN INITIAL EVALUATION ADULT. - PROBLEM SELECTOR PLAN 2
Pt was dx'd in 2014 with pulmonary HTN w/ features of Class III and Class I as per a pulmonary angiogram done at [facility]  - management as per pulmonary team  - consider TTE/chest CT to evaluate disease progression  - consider cardiology consult for RHC to re-evaluate PA pressures  - c/w Nifedipine ER 60 mg BID  - c/w Adempas 0.5 TID

## 2018-05-07 NOTE — DIETITIAN INITIAL EVALUATION ADULT. - ADHERENCE
Pt reports she doesn't use a lot of salt and watches fluid intake. Pt reports previously taking Coumadin but states it was discontinued 4/18/18.

## 2018-05-07 NOTE — PROGRESS NOTE ADULT - PROBLEM SELECTOR PLAN 1
Unclear etiology of tachycardia; may be symptomatic episodic cardiac arrythmia  - TSH and orthostatics WNL  - TTE ordered  - will ambulate to al for tachycardia/symptoms on exertion  - will need outpatient holter monitor and f/u with Dr. Dangelo

## 2018-05-07 NOTE — DISCHARGE NOTE ADULT - PATIENT PORTAL LINK FT
You can access the [facility], offered by [facility], by registering with the following website: [email or web address]

## 2018-05-07 NOTE — PROGRESS NOTE ADULT - ATTENDING COMMENTS
Thank you. My team will follow.    Rivera Brooke M.D, F.A.C.C  [facility]   [phone]

## 2018-05-07 NOTE — DIETITIAN INITIAL EVALUATION ADULT. - ORAL INTAKE PTA
Typical intake: 1/2 egg and cheese sandwich for breakfast and the other 1/2 for lunch; chicken, mac and cheese or spaghetti for dinner. Pt reports taking an MVI PTA./good

## 2018-05-07 NOTE — DIETITIAN INITIAL EVALUATION ADULT. - NS AS NUTRI INTERV MEALS SNACK
General/healthful diet/Continue regular diet, salt and fluid modifications per team as pt with persistent hypotension. Monitor po intake, GI tolerance, weight and lab values. Provide food preferences as requested by patient within therapeutic diet guidelines. RD to remain available for further nutritional interventions as indicated.

## 2018-05-07 NOTE — PROGRESS NOTE ADULT - ATTENDING COMMENTS
pt seen and examined   d/w the team    agree with assessment and plan of the pulmonary fellow  sever pulm htn    wen lomeli--  I discussed with the pt about starting IV Flolan  therapy  ---not clear of  with her home condition she can handle it  -will wen  working with her on this issue    gentle diuresis  --basal tachycardia is of concern  will ask for cardiology input pt seen and examined   d/w the team    agree with assessment and plan of the pulmonary fellow  sever pulm htn contd adampas-- --  I discussed with the pt about starting IV Flolan  therapy  ---not clear of  with her home condition she can handle it  -will wen  working with her on this issue    gentle diuresis  --basal tachycardia is of concern  will ask for cardiology input

## 2018-05-07 NOTE — PROGRESS NOTE ADULT - ASSESSMENT
38 y/o female with PMHx of pulm htn w/ features of Class I/IV, GERD, asthma on home O2 2L at bedtime, chronic PE on xarelto, iron deficiency anemia sent in by pulmonologist Dr. Vivian Yoon to be admitted for further workup for worsening dyspnea on exertion and syncopal episode.

## 2018-05-07 NOTE — DISCHARGE NOTE ADULT - PLAN OF CARE
Improvement in heart rate You were diagnosed with sinus tachycardia on exertion. You were taken off nifedipine and started on coreg. You will be placed on a holter monitor for 30 days to further evaluate your heart rhythm on a day to day basis. You should follow up with Dr. Dangelo within 1-2 weeks of discharge. You had a right heart catheterization that showed improved pulmonary artery pressure (56mmHg) and cardiac output compared to 2014. You should continuing taking adempas as prescribed. You should also continue taking midodrine to help increase your blood pressure. Please follow up with Dr. Yoon within 1-2 weeks of discharge. You were diagnosed with sinus tachycardia (fast heart rate) on exertion likely secondary to right ventricle pressure overload secondary to pulmonary hypertension as seen on TTE (ultrasound of your heart). You were taken off nifedipine and started on coreg. You will be placed on a holter monitor for 30 days to further evaluate your heart rhythm on a day to day basis. You should follow up with Dr. Dangelo within 1-2 weeks of discharge. You had a right heart catheterization that showed improved pulmonary artery pressure (56mmHg) and cardiac output compared to 2014. However, your tachycardia is likely related to right ventricle pressure overload due to your pulmonary hypertension. You were started on flolan to help reduce your pulmonary pressures. You should continuing taking adempas as prescribed. You should also continue taking midodrine to help increase your blood pressure. Please follow up with Dr. Yoon within 1-2 weeks of discharge. Be evaluated at [place] for potential Lung Transplant Your shortness of breath is due to your severe pulmonary hypertension. You may need a lung transplant for symptom relief Your blood pressures were low and you required medications to keep them elevated.

## 2018-05-08 ENCOUNTER — CHART COPY (OUTPATIENT)
Age: 38
End: 2018-05-08

## 2018-05-08 PROCEDURE — 99233 SBSQ HOSP IP/OBS HIGH 50: CPT | Mod: GC

## 2018-05-08 PROCEDURE — 99232 SBSQ HOSP IP/OBS MODERATE 35: CPT | Mod: GC

## 2018-05-08 PROCEDURE — 99233 SBSQ HOSP IP/OBS HIGH 50: CPT

## 2018-05-08 RX ORDER — FERROUS SULFATE 325(65) MG
325 TABLET ORAL DAILY
Qty: 0 | Refills: 0 | Status: DISCONTINUED | OUTPATIENT
Start: 2018-05-08 | End: 2018-05-13

## 2018-05-08 RX ORDER — MIDODRINE HYDROCHLORIDE 2.5 MG/1
5 TABLET ORAL THREE TIMES A DAY
Qty: 0 | Refills: 0 | Status: DISCONTINUED | OUTPATIENT
Start: 2018-05-08 | End: 2018-05-11

## 2018-05-08 RX ADMIN — Medication 650 MILLIGRAM(S): at 08:58

## 2018-05-08 RX ADMIN — Medication 100 MILLIGRAM(S): at 13:15

## 2018-05-08 RX ADMIN — Medication 500 MICROGRAM(S): at 06:24

## 2018-05-08 RX ADMIN — MIDODRINE HYDROCHLORIDE 5 MILLIGRAM(S): 2.5 TABLET ORAL at 15:07

## 2018-05-08 RX ADMIN — Medication 325 MILLIGRAM(S): at 15:06

## 2018-05-08 RX ADMIN — RIVAROXABAN 15 MILLIGRAM(S): KIT at 08:29

## 2018-05-08 RX ADMIN — Medication 500 MICROGRAM(S): at 20:03

## 2018-05-08 RX ADMIN — Medication 10 MILLIGRAM(S): at 06:24

## 2018-05-08 RX ADMIN — Medication 500 MICROGRAM(S): at 15:04

## 2018-05-08 RX ADMIN — Medication 100 MILLIGRAM(S): at 06:24

## 2018-05-08 RX ADMIN — RIVAROXABAN 15 MILLIGRAM(S): KIT at 17:19

## 2018-05-08 RX ADMIN — SPIRONOLACTONE 25 MILLIGRAM(S): 25 TABLET, FILM COATED ORAL at 06:24

## 2018-05-08 RX ADMIN — Medication 1 TABLET(S): at 11:28

## 2018-05-08 RX ADMIN — Medication 100 MILLIGRAM(S): at 21:00

## 2018-05-08 RX ADMIN — Medication 500 MICROGRAM(S): at 22:47

## 2018-05-08 RX ADMIN — Medication 650 MILLIGRAM(S): at 08:29

## 2018-05-08 RX ADMIN — MIDODRINE HYDROCHLORIDE 5 MILLIGRAM(S): 2.5 TABLET ORAL at 20:59

## 2018-05-08 RX ADMIN — PANTOPRAZOLE SODIUM 40 MILLIGRAM(S): 20 TABLET, DELAYED RELEASE ORAL at 06:24

## 2018-05-08 NOTE — PROGRESS NOTE ADULT - PROBLEM SELECTOR PLAN 1
Patient with symptomatic sinus tachycardia up to 150s on ambulation  - TSH and orthostatics WNL  - TTE ordered  - F/u Cardiology on possibly starting rate control agent  - On discharge will need holter monitor and f/u with Dr. Dangelo

## 2018-05-08 NOTE — PROGRESS NOTE ADULT - ATTENDING COMMENTS
Thank you. My team will follow.    Rivera Brooke M.D, F.A.C.C  [facility]   [phone]

## 2018-05-08 NOTE — PROGRESS NOTE ADULT - SUBJECTIVE AND OBJECTIVE BOX
Interval Events:  Patient seen and examined at bedside this morning.   s/p 2 doses of Adempas - at 9 pm and 6am. Tolerated well. Lowest SBP 90; asymptomatic  Nifedipine on hold.       REVIEW OF SYSTEMS:  Constitutional: relative hypotension  CV:  + palpitations with ambulation  Resp:  stable  GI: no abd pain      [x ] All other systems negative  [ ] Unable to assess ROS because ________    OBJECTIVE:    ICU Vital Signs Last 24 Hrs  T(C): 36.7 (08 May 2018 04:13), Max: 36.7 (08 May 2018 04:13)  T(F): 98.1 (08 May 2018 04:13), Max: 98.1 (08 May 2018 04:13)  HR: 92 (08 May 2018 04:13) (90 - 120)  BP: 93/65 (08 May 2018 06:15) (92/64 - 106/73)  BP(mean): --  ABP: --  ABP(mean): --  RR: 18 (08 May 2018 04:13) (18 - 18)  SpO2: 96% (08 May 2018 04:13) (95% - 98%)        05-07 @ 07:01  -  05-08 @ 07:00  --------------------------------------------------------  IN: 480 mL / OUT: 0 mL / NET: 480 mL    05-08 @ 07:01 - 05-08 @ 11:42  --------------------------------------------------------  IN: 240 mL / OUT: 0 mL / NET: 240 mL      CAPILLARY BLOOD GLUCOSE      PHYSICAL EXAM:  General: Well appearing Female. No apparent distress  HEENT:   Mucous membranes are moist.  Neck: Supple. No JVD  Respiratory: clear  Cardiovascular: RRR, no m/r/g  Abdomen: Soft, non-tender, non-distended.   Extremities: No lower extremity edema.  Skin: Warm, dry, no rash.   Neurological: CNII-XII grossly intact.   Psychiatry: appropriate mood and affect.     HOSPITAL MEDICATIONS:      HOSPITAL MEDICATIONS:  MEDICATIONS  (STANDING):  Adempas 0.5 milliGRAM(s) 0.5 milliGRAM(s) Oral three times a day  benzonatate 100 milliGRAM(s) Oral three times a day  ipratropium    for Nebulization 500 MICROGram(s) Nebulizer every 6 hours  multivitamin 1 Tablet(s) Oral daily  pantoprazole    Tablet 40 milliGRAM(s) Oral before breakfast  predniSONE   Tablet 10 milliGRAM(s) Oral daily  rivaroxaban 15 milliGRAM(s) Oral every 12 hours  spironolactone 25 milliGRAM(s) Oral daily    MEDICATIONS  (PRN):  acetaminophen   Tablet. 650 milliGRAM(s) Oral every 6 hours PRN mild to moderate pain  guaiFENesin    Syrup 100 milliGRAM(s) Oral every 6 hours PRN Cough      LABS:    05-07    137  |  101  |  14  ----------------------------<  81  3.7   |  20<L>  |  1.07    Ca    8.9      07 May 2018 06:44  Phos  4.3     05-07  Mg     2.4     05-07        RADIOLOGY:  [ x ] Reviewed and interpreted by me    < from: Cardiac Cath Lab - Adult (05.04.18 @ 15:14) >  Pressures:  -- Pulmonary Artery (S/D/M): 88/31/56  Pressures:  -- Pulmonary Capillary Wedge: 10/10/6  Pressures:  -- Right Atrium (a/v/M): 13/4/4  Pressures:  -- Right Ventricle (s/edp): 90/14/--    O2 Sats:  -- AO: 9.8/97/12.93  O2 Sats:  -- PA: 9.8/61.2/8.16  Outputs:  Baseline  Outputs:  -- OUTPUTS: CO by Xenia: 5.76  Outputs:  < end of copied text >    < from: Transthoracic Echocardiogram (08.17.16 @ 22:14) >  1. Normal left ventricular internal dimensions and wall  thicknesses.  2. Hyperdynamic left ventricle. Flattening of the  interventricular septum in both systole and diastole is  consistent with right ventricular pressure overload.  3. Decreased right ventricular systolic function.  4. Trace pericardial effusion.    < end of copied text >

## 2018-05-08 NOTE — PROGRESS NOTE ADULT - ASSESSMENT
37 F w/ pulmonary HTN w/ Class I/IV features (dx in 2014), chronic asthma, chronic PE on Xarelto, GERD p/w dyspnea at rest now s/p RHC found to have improvement in mPAP and cardiac output restarted on home pHTN meds now found to have persistent hypotension with episodic symptomatic tachycardia. Transferred to [facility] for tele monitoring.

## 2018-05-08 NOTE — PROGRESS NOTE ADULT - PROBLEM SELECTOR PLAN 4
Pt has chronic asthma, recent increase in Atrovent nebulizer use as the weather is changing and she is having mild sputum production; not actively wheezing or in respiratory distress  - supplemental O2 PRN; O2 > 92   - Atrovent PRN

## 2018-05-08 NOTE — PROGRESS NOTE ADULT - SUBJECTIVE AND OBJECTIVE BOX
Esme Person MD (PGY-1)  Pager: [phone]  7AM-7PM  For emergent questions between 7PM and 7AM an on weekends after 12PM, please page [phone].      Patient is a 37y old  Female who presents with a chief complaint of pulmonary artery HTN work up (07 May 2018 20:35)      SUBJECTIVE / OVERNIGHT EVENTS:  Patient with symptomatic sinus tachycardia to 150s on ambulation. Patient reporting no change in symptoms, has continued dyspnea only on ambulation. Re-started on Adempas and discontinued nifedipine due to hypotension.    MEDICATIONS  (STANDING):  Adempas 0.5 milliGRAM(s) 0.5 milliGRAM(s) Oral three times a day  benzonatate 100 milliGRAM(s) Oral three times a day  ipratropium    for Nebulization 500 MICROGram(s) Nebulizer every 6 hours  midodrine 5 milliGRAM(s) Oral three times a day  multivitamin 1 Tablet(s) Oral daily  pantoprazole    Tablet 40 milliGRAM(s) Oral before breakfast  predniSONE   Tablet 10 milliGRAM(s) Oral daily  rivaroxaban 15 milliGRAM(s) Oral every 12 hours  spironolactone 25 milliGRAM(s) Oral daily    MEDICATIONS  (PRN):  acetaminophen   Tablet. 650 milliGRAM(s) Oral every 6 hours PRN mild to moderate pain  guaiFENesin    Syrup 100 milliGRAM(s) Oral every 6 hours PRN Cough      Vital Signs:  Vital Signs Last 24 Hrs  T(C): 37.1 (08 May 2018 12:20), Max: 37.1 (08 May 2018 12:20)  T(F): 98.7 (08 May 2018 12:20), Max: 98.7 (08 May 2018 12:20)  HR: 93 (08 May 2018 12:20) (90 - 120)  BP: 93/70 (08 May 2018 12:20) (92/64 - 106/73)  BP(mean): --  RR: 18 (08 May 2018 12:20) (18 - 18)  SpO2: 98% (08 May 2018 12:20) (95% - 98%)  Daily     Daily   CAPILLARY BLOOD GLUCOSE        I&O's Summary    07 May 2018 07:01  -  08 May 2018 07:00  --------------------------------------------------------  IN: 480 mL / OUT: 0 mL / NET: 480 mL    08 May 2018 07:01  -  08 May 2018 13:09  --------------------------------------------------------  IN: 240 mL / OUT: 0 mL / NET: 240 mL        PHYSICAL EXAM  Constitutional: Young female, NAD, resting comfortably in bed  Eyes: conjunctiva and sclera clear  Respiratory: Normal work of breathing, CTA B/L; no W/R/R, no retractions  Cardiac: Normal rate and regular rhythm, +S1/S2; no M/R/G  Gastrointestinal: soft, NT/ND; no rebound or guarding; +BSx4  Extremities: well perfused, no clubbing or cyanosis; no peripheral edema; 2+ peripheral pulses  Dermatologic: skin warm, dry and intact; no rashes, wounds, or scars  Neurologic: AAOx3; responding to questions appropriately, moving all extremities    LABS:    05-07    137  |  101  |  14  ----------------------------<  81  3.7   |  20<L>  |  1.07    Ca    8.9      07 May 2018 06:44  Phos  4.3     05-07  Mg     2.4     05-07                RADIOLOGY & ADDITIONAL TESTS:

## 2018-05-08 NOTE — PROGRESS NOTE ADULT - ASSESSMENT
36 y/o female with PMHx of pulm htn w/ features of Class I/IV, GERD, asthma on home O2 2L at bedtime, chronic PE on xarelto, iron deficiency anemia sent in by pulmonologist Dr. Vivian Yoon to be admitted for further workup for worsening dyspnea on exertion and syncopal episode.

## 2018-05-08 NOTE — PROGRESS NOTE ADULT - ASSESSMENT
36 y/o female with PMHx of pulm htn w/ features of WHO Group I and IV.  Tolerated first 2 doses of Adempas    -- hold Nifedipine  -- will discuss with Dr Yoon regarding Adempas dosing  -- monitor BPs, if SBP <90, would give midodrine 10 mg PO    will discuss with Dr Car Holman  Pulmonary Fellow  p [phone]

## 2018-05-08 NOTE — PROGRESS NOTE ADULT - PROBLEM SELECTOR PLAN 1
Sinus tachycardia 150-160's, rhythm strips reviewed. Patient gets tachycardic during exertion such as going to bathroom and heart comes back to sinus rhythm gradually upon rest. Recommend MCOT monitor for 30 days as an outpatient and follow up with Dr. Dangelo who is her primary cardiologist.   TTE pending for evaluation of LV function and r/o valvular abnormality.  One set of orthostatics obtained which are negative. Continue telemetry monitoring.

## 2018-05-08 NOTE — PROGRESS NOTE ADULT - PROBLEM SELECTOR PLAN 3
Pt was dx'd in 2014 with pulmonary HTN w/ features of Class I and Class IV as per a pulmonary angiogram done at [facility]. S/p RHC 5/4 showing improved mPAP (56mmHg) and CO as compared to 2014  - d/c nifedipine  - c/w adempas TID  - will add midodrine 5mg BID for borderline hypotension on adempas  - Pulmonology following, recommendations appreciated

## 2018-05-08 NOTE — PROGRESS NOTE ADULT - ATTENDING COMMENTS
Agree with domenic with following addendum    #sinus tachycardia  -patient with tachycardia up to 150's with exertion, assymptomatic.  -will attempt to take PO2 at time of exertion, as concern is relative hypoxia causing tachycardia given no other source  -patient to be discharged with holter monitor and f/u with Dr. Mcknight  -to discuss utility of beta blocker    #Pulmonary HTN  -managmenet and titration of medications per pulm  -discussed case personally with pulmonolgy fellow, no flovent at this time.  Will do midodrine 5mg BID and adempas as per Dr. Car VALDEZ planning hopefully next 24 hours.

## 2018-05-08 NOTE — PROGRESS NOTE ADULT - ATTENDING COMMENTS
pt seen and  Examined     pt not ready for IV flolan    will contd adampas  0.5 mg po tid  add midodrine if necessary to keep  SBP >90

## 2018-05-08 NOTE — PROGRESS NOTE ADULT - SUBJECTIVE AND OBJECTIVE BOX
PROGRESS NOTE  Reason for follow up: syncope  Overnight: No new events.   Update: Patient sitting in bed. No complaints. Episodes of sinus tachycardia while going to the bathroom 150-160 BPM.     Subjective: "I feel good"  Complains of: none  Review of symptoms: Cardiac:  No chest pain. No dyspnea. No palpitations.  Respiratory:no cough. No dyspnea  Gastrointestinal: No diarrhea. No abdominal pain. No bleeding.     Past medical history: No updates.   Chronic conditions:  Pulmonary Hypertension: controlled. GERD: Stable. pulmonary embolism: Stable;    	  Vitals:  T(C): 37.1 (08 May 2018 12:20), Max: 37.1 (08 May 2018 12:20)  T(F): 98.7 (08 May 2018 12:20), Max: 98.7 (08 May 2018 12:20)  HR: 103 (08 May 2018 15:10) (90 - 103)  BP: 91/53 (08 May 2018 15:10) (91/53 - 102/68)  BP(mean): --  RR: 18 (08 May 2018 12:20) (18 - 18)  SpO2: 98% (08 May 2018 12:20) (96% - 98%)        PHYSICAL EXAM:  Appearance: Comfortable. No acute distress  HEENT:  Head and neck: Atraumatic. Normocephalic.  Normal oral mucosa, PERRL, Neck is supple. No JVD, No carotid bruit.   Neurologic: A & O x 3, no focal deficits. EOMI   Lymphatic: No cervical lymphadenopathy  Cardiovascular: Normal S1 S2, No murmur, rubs/gallops. No JVD, No edema  Respiratory: Lungs clear to auscultation  Gastrointestinal:  Soft, Non-tender, + BS  Lower Extremities: No edema  Psychiatry: Patient is calm. No agitation. Mood & affect appropriate  Skin: No rashes/ ecchymoses/cyanosis/ulcers visualized on the face, hands or feet.    CURRENT MEDICATIONS:  MEDICATIONS  (STANDING):  Adempas 0.5 milliGRAM(s) 0.5 milliGRAM(s) Oral three times a day  benzonatate 100 milliGRAM(s) Oral three times a day  ferrous    sulfate 325 milliGRAM(s) Oral daily  ipratropium    for Nebulization 500 MICROGram(s) Nebulizer every 6 hours  midodrine 5 milliGRAM(s) Oral three times a day  multivitamin 1 Tablet(s) Oral daily  pantoprazole    Tablet 40 milliGRAM(s) Oral before breakfast  rivaroxaban 15 milliGRAM(s) Oral every 12 hours  spironolactone 25 milliGRAM(s) Oral daily    MEDICATIONS  (PRN):  acetaminophen   Tablet. 650 milliGRAM(s) Oral every 6 hours PRN mild to moderate pain  guaiFENesin    Syrup 100 milliGRAM(s) Oral every 6 hours PRN Cough      LABS:	 	        07 May 2018 06:44    137    |  101    |  14     ----------------------------<  81     3.7     |  20     |  1.07     Ca    8.9        07 May 2018 06:44  Phos  4.3       07 May 2018 06:44  Mg     2.4       07 May 2018 06:44    < from: Cardiac Cath Lab - Adult (05.04.18 @ 15:14) >   Hemodynamic assessment demonstrates normal pulmonary  capillary wedge pressure. There is severe pulmonary hypertension.    < end of copied text >    TPro  6.7  /  Alb  3.8  /  TBili  0.6  /  DBili  x   /  AST  11  /  ALT  8<L>  /  AlkPhos  36<L>  05-06    proBNP:   Lipid Profile:   HgA1c: TSH: Thyroid Stimulating Hormone, Serum: 4.09 uIU/mL      TELEMETRY: Reviewed sinus tachycardia, 150-160 BPM  ECG:  Reviewed by me. sinus rhythm, RVH	    DIAGNOSTIC TESTING:  [ ] Echocardiogram: pending  [ ]  Catheterization:    [ ] CAT scan  < from: CT Angio Chest w/ IV Cont (05.02.18 @ 21:09) >  No evidence of pulmonary embolism.    Pulmonary hypertension with enlarged main pulmonary artery, right atrium   and ventricle. There is thickening of the right ventricular free wall    Interval resolution of previously seen airspace opacities,with residual   scattered bilateral groundglass opacities in a mosaic pattern, likely   secondary to pulmonary hypertension.    < end of copied text >    OTHER:

## 2018-05-09 LAB
ANION GAP SERPL CALC-SCNC: 18 MMOL/L — HIGH (ref 5–17)
BUN SERPL-MCNC: 15 MG/DL — SIGNIFICANT CHANGE UP (ref 7–23)
CALCIUM SERPL-MCNC: 9 MG/DL — SIGNIFICANT CHANGE UP (ref 8.4–10.5)
CHLORIDE SERPL-SCNC: 102 MMOL/L — SIGNIFICANT CHANGE UP (ref 96–108)
CO2 SERPL-SCNC: 18 MMOL/L — LOW (ref 22–31)
CREAT SERPL-MCNC: 1.02 MG/DL — SIGNIFICANT CHANGE UP (ref 0.5–1.3)
GLUCOSE SERPL-MCNC: 78 MG/DL — SIGNIFICANT CHANGE UP (ref 70–99)
HCT VFR BLD CALC: 34.1 % — LOW (ref 34.5–45)
HGB BLD-MCNC: 10.5 G/DL — LOW (ref 11.5–15.5)
MAGNESIUM SERPL-MCNC: 2 MG/DL — SIGNIFICANT CHANGE UP (ref 1.6–2.6)
MCHC RBC-ENTMCNC: 19.3 PG — LOW (ref 27–34)
MCHC RBC-ENTMCNC: 30.8 GM/DL — LOW (ref 32–36)
MCV RBC AUTO: 62.6 FL — LOW (ref 80–100)
PHOSPHATE SERPL-MCNC: 4.4 MG/DL — SIGNIFICANT CHANGE UP (ref 2.5–4.5)
PLATELET # BLD AUTO: 372 K/UL — SIGNIFICANT CHANGE UP (ref 150–400)
POTASSIUM SERPL-MCNC: 3.9 MMOL/L — SIGNIFICANT CHANGE UP (ref 3.5–5.3)
POTASSIUM SERPL-SCNC: 3.9 MMOL/L — SIGNIFICANT CHANGE UP (ref 3.5–5.3)
RBC # BLD: 5.45 M/UL — HIGH (ref 3.8–5.2)
RBC # FLD: 19 % — HIGH (ref 10.3–14.5)
SODIUM SERPL-SCNC: 138 MMOL/L — SIGNIFICANT CHANGE UP (ref 135–145)
WBC # BLD: 14.44 K/UL — HIGH (ref 3.8–10.5)
WBC # FLD AUTO: 14.44 K/UL — HIGH (ref 3.8–10.5)

## 2018-05-09 PROCEDURE — 93306 TTE W/DOPPLER COMPLETE: CPT | Mod: 26

## 2018-05-09 PROCEDURE — 99232 SBSQ HOSP IP/OBS MODERATE 35: CPT | Mod: GC

## 2018-05-09 PROCEDURE — 99233 SBSQ HOSP IP/OBS HIGH 50: CPT | Mod: GC

## 2018-05-09 PROCEDURE — 99232 SBSQ HOSP IP/OBS MODERATE 35: CPT

## 2018-05-09 RX ORDER — CARVEDILOL PHOSPHATE 80 MG/1
3.12 CAPSULE, EXTENDED RELEASE ORAL EVERY 12 HOURS
Qty: 0 | Refills: 0 | Status: DISCONTINUED | OUTPATIENT
Start: 2018-05-09 | End: 2018-05-10

## 2018-05-09 RX ORDER — METOPROLOL TARTRATE 50 MG
12.5 TABLET ORAL
Qty: 0 | Refills: 0 | Status: DISCONTINUED | OUTPATIENT
Start: 2018-05-09 | End: 2018-05-09

## 2018-05-09 RX ADMIN — Medication 500 MICROGRAM(S): at 11:46

## 2018-05-09 RX ADMIN — MIDODRINE HYDROCHLORIDE 5 MILLIGRAM(S): 2.5 TABLET ORAL at 14:39

## 2018-05-09 RX ADMIN — SPIRONOLACTONE 25 MILLIGRAM(S): 25 TABLET, FILM COATED ORAL at 05:35

## 2018-05-09 RX ADMIN — Medication 100 MILLIGRAM(S): at 05:35

## 2018-05-09 RX ADMIN — MIDODRINE HYDROCHLORIDE 5 MILLIGRAM(S): 2.5 TABLET ORAL at 21:58

## 2018-05-09 RX ADMIN — Medication 500 MICROGRAM(S): at 18:30

## 2018-05-09 RX ADMIN — PANTOPRAZOLE SODIUM 40 MILLIGRAM(S): 20 TABLET, DELAYED RELEASE ORAL at 05:35

## 2018-05-09 RX ADMIN — CARVEDILOL PHOSPHATE 3.12 MILLIGRAM(S): 80 CAPSULE, EXTENDED RELEASE ORAL at 21:58

## 2018-05-09 RX ADMIN — RIVAROXABAN 15 MILLIGRAM(S): KIT at 21:58

## 2018-05-09 RX ADMIN — Medication 500 MICROGRAM(S): at 05:35

## 2018-05-09 RX ADMIN — CARVEDILOL PHOSPHATE 3.12 MILLIGRAM(S): 80 CAPSULE, EXTENDED RELEASE ORAL at 11:46

## 2018-05-09 RX ADMIN — Medication 650 MILLIGRAM(S): at 09:30

## 2018-05-09 RX ADMIN — Medication 100 MILLIGRAM(S): at 14:39

## 2018-05-09 RX ADMIN — Medication 1 TABLET(S): at 11:46

## 2018-05-09 RX ADMIN — Medication 100 MILLIGRAM(S): at 21:58

## 2018-05-09 RX ADMIN — Medication 325 MILLIGRAM(S): at 11:46

## 2018-05-09 RX ADMIN — MIDODRINE HYDROCHLORIDE 5 MILLIGRAM(S): 2.5 TABLET ORAL at 05:35

## 2018-05-09 RX ADMIN — Medication 500 MICROGRAM(S): at 23:25

## 2018-05-09 RX ADMIN — Medication 650 MILLIGRAM(S): at 08:44

## 2018-05-09 RX ADMIN — RIVAROXABAN 15 MILLIGRAM(S): KIT at 08:44

## 2018-05-09 NOTE — PROGRESS NOTE ADULT - SUBJECTIVE AND OBJECTIVE BOX
Esme Person MD (PGY-1)  Pager: [phone]  7AM-7PM  For emergent questions between 7PM and 7AM an on weekends after 12PM, please page [phone].      Patient is a 37y old  Female who presents with a chief complaint of pulmonary artery HTN work up (07 May 2018 20:35)      SUBJECTIVE / OVERNIGHT EVENTS:  Patient with sinus tachycardia to 150s on exertion, denies associated symptoms of dizziness/lightheadedness, sob, chest pain. Tolerating re-initiation of adempas with the help of midodrine support.    MEDICATIONS  (STANDING):  Adempas 0.5 milliGRAM(s) 0.5 milliGRAM(s) Oral three times a day  benzonatate 100 milliGRAM(s) Oral three times a day  ferrous    sulfate 325 milliGRAM(s) Oral daily  ipratropium    for Nebulization 500 MICROGram(s) Nebulizer every 6 hours  midodrine 5 milliGRAM(s) Oral three times a day  multivitamin 1 Tablet(s) Oral daily  pantoprazole    Tablet 40 milliGRAM(s) Oral before breakfast  rivaroxaban 15 milliGRAM(s) Oral every 12 hours  spironolactone 25 milliGRAM(s) Oral daily    MEDICATIONS  (PRN):  acetaminophen   Tablet. 650 milliGRAM(s) Oral every 6 hours PRN mild to moderate pain  guaiFENesin    Syrup 100 milliGRAM(s) Oral every 6 hours PRN Cough      Vital Signs:  Vital Signs Last 24 Hrs  T(C): 36.8 (09 May 2018 04:49), Max: 37.1 (08 May 2018 12:20)  T(F): 98.2 (09 May 2018 04:49), Max: 98.7 (08 May 2018 12:20)  HR: 100 (09 May 2018 04:49) (93 - 103)  BP: 98/63 (09 May 2018 04:49) (91/53 - 98/63)  BP(mean): --  RR: 18 (09 May 2018 04:49) (18 - 18)  SpO2: 98% (09 May 2018 04:49) (98% - 98%)  Daily     Daily   CAPILLARY BLOOD GLUCOSE        I&O's Summary    08 May 2018 07:01  -  09 May 2018 07:00  --------------------------------------------------------  IN: 480 mL / OUT: 0 mL / NET: 480 mL        PHYSICAL EXAM  Constitutional: Young female, NAD, resting comfortably in bed  Eyes: conjunctiva and sclera clear  Respiratory: Normal work of breathing, CTA B/L; no W/R/R, no retractions  Cardiac: Normal rate and regular rhythm, +S1/S2; no M/R/G  Gastrointestinal: soft, NT/ND; no rebound or guarding; +BSx4  Extremities: well perfused, no clubbing or cyanosis; no peripheral edema; 2+ peripheral pulses  Dermatologic: skin warm, dry and intact; no rashes, wounds, or scars  Neurologic: AAOx3; responding to questions appropriately, moving all extremities      LABS:                        10.5   14.44 )-----------( 372      ( 09 May 2018 07:43 )             34.1     05-09    138  |  102  |  15  ----------------------------<  78  3.9   |  18<L>  |  1.02    Ca    9.0      09 May 2018 06:21  Phos  4.4     05-09  Mg     2.0     05-09                RADIOLOGY & ADDITIONAL TESTS:

## 2018-05-09 NOTE — PROGRESS NOTE ADULT - ASSESSMENT
37 F w/ pulmonary HTN w/ Class I/IV features (dx in 2014), chronic asthma, chronic PE on Xarelto, GERD p/w dyspnea at rest now s/p RHC found to have improvement in mPAP and cardiac output restarted on home pHTN meds now found to have persistent hypotension with sinus tachycardia on exertion. Transferred to [facility] for tele monitoring.

## 2018-05-09 NOTE — PROGRESS NOTE ADULT - ATTENDING COMMENTS
pt seen and examine d    d/w the team  tolerating Ad[place]as    but she will need IV flolan  pt hesitant for long term IV flolan  therapy    met with family all questions answered---  she will let me know about her decision  about flolan

## 2018-05-09 NOTE — PROGRESS NOTE ADULT - PROBLEM SELECTOR PLAN 4
Hx of iron deficiency anemia in setting of heavy menstrual bleeding now s/p D+C; no s/s of bleeding currently  - c/w ferrous sulfate 325  - trend H/H via CBC  - transfuse if < 7

## 2018-05-09 NOTE — PROGRESS NOTE ADULT - PROBLEM SELECTOR PLAN 2
Pt was dx'd in 2014 with pulmonary HTN w/ features of Class I and Class IV as per a pulmonary angiogram done at [facility]. S/p RHC 5/4 showing improved mPAP (56mmHg) and CO as compared to 2014  - d/c nifedipine  - c/w adempas TID  - will add midodrine 5mg BID for borderline hypotension on adempas  - Pulmonology following, recommendations appreciated Pt was dx'd in 2014 with pulmonary HTN w/ features of Class I and Class IV as per a pulmonary angiogram done at [facility]. S/p RHC 5/4 showing improved mPAP (56mmHg) and CO as compared to 2014  - d/c nifedipine  - c/w adempas TID  - will add midodrine 5mg TID for borderline hypotension on adempas  - Pulmonology following, recommendations appreciated

## 2018-05-09 NOTE — PROGRESS NOTE ADULT - ATTENDING COMMENTS
Agree with above with following addendum:    #Sinus tachycardia  -paitnet with sinus tachycardia with minimal exertion.  Ambluated presonally with R1 in hallway, did NOT desaturate.  No PE.  Unclear etiology of tachycardia as no pain, but patient IS symptomatic from it, stating stops to let heart rate slow down at home and then starts walking afterwards.  Patient with hallucinations to toprol, will initiate low dose coreg today and re-assess.

## 2018-05-09 NOTE — PROGRESS NOTE ADULT - SUBJECTIVE AND OBJECTIVE BOX
Interval Events:  Patient seen and examined at bedside this morning.   Tolerating Adempas  persistent tachycardia to 150s-160s while ambulating      REVIEW OF SYSTEMS:  Constitutional: relative hypotension  CV:  + palpitations with ambulation  Resp:  stable  GI: no abd pain      [x ] All other systems negative  [ ] Unable to assess ROS because ________    OBJECTIVE:      ICU Vital Signs Last 24 Hrs  T(C): 36.8 (09 May 2018 04:49), Max: 37.1 (08 May 2018 12:20)  T(F): 98.2 (09 May 2018 04:49), Max: 98.7 (08 May 2018 12:20)  HR: 100 (09 May 2018 04:49) (93 - 103)  BP: 98/63 (09 May 2018 04:49) (91/53 - 98/63)    RR: 18 (09 May 2018 04:49) (18 - 18)  SpO2: 98% (09 May 2018 04:49) (98% - 98%)    05-08 @ 07:01  -  05-09 @ 07:00  --------------------------------------------------------  IN: 480 mL / OUT: 0 mL / NET: 480 mL      PHYSICAL EXAM:  General: Well appearing Female. No apparent distress  HEENT:   Mucous membranes are moist.  Neck: Supple. No JVD  Respiratory: clear  Cardiovascular: RRR, no m/r/g  Abdomen: Soft, non-tender, non-distended.   Extremities: No lower extremity edema.  Skin: Warm, dry, no rash.   Neurological: CNII-XII grossly intact.   Psychiatry: appropriate mood and affect.     HOSPITAL MEDICATIONS:      HOSPITAL MEDICATIONS:  MEDICATIONS  (STANDING):  Adempas 0.5 milliGRAM(s) 0.5 milliGRAM(s) Oral three times a day  benzonatate 100 milliGRAM(s) Oral three times a day  ferrous    sulfate 325 milliGRAM(s) Oral daily  ipratropium    for Nebulization 500 MICROGram(s) Nebulizer every 6 hours  metoprolol tartrate 12.5 milliGRAM(s) Oral two times a day  midodrine 5 milliGRAM(s) Oral three times a day  multivitamin 1 Tablet(s) Oral daily  pantoprazole    Tablet 40 milliGRAM(s) Oral before breakfast  rivaroxaban 15 milliGRAM(s) Oral every 12 hours  spironolactone 25 milliGRAM(s) Oral daily    MEDICATIONS  (PRN):  acetaminophen   Tablet. 650 milliGRAM(s) Oral every 6 hours PRN mild to moderate pain  guaiFENesin    Syrup 100 milliGRAM(s) Oral every 6 hours PRN Cough      LABS:                        10.5   14.44 )-----------( 372      ( 09 May 2018 07:43 )             34.1     05-09    138  |  102  |  15  ----------------------------<  78  3.9   |  18<L>  |  1.02    Ca    9.0      09 May 2018 06:21  Phos  4.4     05-09  Mg     2.0     05-09        RADIOLOGY:  [ x ] Reviewed and interpreted by me    < from: Cardiac Cath Lab - Adult (05.04.18 @ 15:14) >  Pressures:  -- Pulmonary Artery (S/D/M): 88/31/56  Pressures:  -- Pulmonary Capillary Wedge: 10/10/6  Pressures:  -- Right Atrium (a/v/M): 13/4/4  Pressures:  -- Right Ventricle (s/edp): 90/14/--    O2 Sats:  -- AO: 9.8/97/12.93  O2 Sats:  -- PA: 9.8/61.2/8.16  Outputs:  Baseline  Outputs:  -- OUTPUTS: CO by Xenia: 5.76  Outputs:  < end of copied text >    < from: Transthoracic Echocardiogram (08.17.16 @ 22:14) >  1. Normal left ventricular internal dimensions and wall  thicknesses.  2. Hyperdynamic left ventricle. Flattening of the  interventricular septum in both systole and diastole is  consistent with right ventricular pressure overload.  3. Decreased right ventricular systolic function.  4. Trace pericardial effusion.    < end of copied text >

## 2018-05-09 NOTE — PROGRESS NOTE ADULT - SUBJECTIVE AND OBJECTIVE BOX
Cardiology Attending Progress Note    CHIEF COMPLAINT/REASON FOR CONSULT: Palptations    HISTORY OF PRESENT ILLNESS:    37 y.o F w/ Pulmonary HTN,  GERD, Asthma on Home O2, chronic pulmonary embolism (dx 2014) on xarelto, admitted 05/01/2018 after progressive dyspnea, s/p recent admission at OSH for syncope where she was sitting on the bed, noted palpitations, and had sudden LOC (awoke on the floor after several minutes, no confusion, no loss of bowel control, no urinary incontinence, reports she was shaking). In the hospital CT-Angio done 05/02/2018 and negative for pulmonary embolism. Cardiology consulted for palpitations.    INTERVAL EVENTS:   Patient seen and examined. Overall she states that she feels well. No Chest Pain. No SOB. No HA/Dizziness. No Palpitations. No N/V/D/F/C.    Allergies    No Known Allergies    Intolerances    	    MEDICATIONS:  carvedilol 3.125 milliGRAM(s) Oral every 12 hours  midodrine 5 milliGRAM(s) Oral three times a day  rivaroxaban 15 milliGRAM(s) Oral every 12 hours  spironolactone 25 milliGRAM(s) Oral daily      benzonatate 100 milliGRAM(s) Oral three times a day  guaiFENesin    Syrup 100 milliGRAM(s) Oral every 6 hours PRN  ipratropium    for Nebulization 500 MICROGram(s) Nebulizer every 6 hours    acetaminophen   Tablet. 650 milliGRAM(s) Oral every 6 hours PRN    pantoprazole    Tablet 40 milliGRAM(s) Oral before breakfast      ferrous    sulfate 325 milliGRAM(s) Oral daily  multivitamin 1 Tablet(s) Oral daily      PAST MEDICAL & SURGICAL HISTORY:  PE (pulmonary thromboembolism): on coumadin  Asthma with status asthmaticus, unspecified asthma severity  Sinus tachycardia  Asthma  Pulmonary embolism  Pulmonary hypertension  Anemia: On home O2 at 2L via NC  Tachycardia  Pulmonary hypertension  No significant past surgical history  History of tubal ligation      FAMILY HISTORY:  No pertinent family history in first degree relatives  Family history of diabetes mellitus in mother  Family history of diabetes mellitus      SOCIAL HISTORY:    Never smoked, no ETOH, no IVDU    REVIEW OF SYSTEMS:    CONSTITUTIONAL: No weakness, fevers or chills  EYES/ENT: No visual changes;  No vertigo or throat pain   NECK: No pain or stiffness  RESPIRATORY: No cough, wheezing, hemoptysis; No shortness of breath  CARDIOVASCULAR: No chest pain or palpitations  GASTROINTESTINAL: No abdominal or epigastric pain. No nausea, vomiting, or hematemesis; No diarrhea or constipation. No melena or hematochezia.  GENITOURINARY: No dysuria, frequency or hematuria  NEUROLOGICAL: No numbness or weakness  SKIN: No itching, burning, rashes, or lesions   All other review of systems is negative unless indicated above.    PHYSICAL EXAM:  T(C): 37 (05-09-18 @ 11:13), Max: 37 (05-09-18 @ 11:13)  HR: 95 (05-09-18 @ 14:38) (95 - 121)  BP: 92/58 (05-09-18 @ 14:38) (92/58 - 111/74)  RR: 18 (05-09-18 @ 14:38) (18 - 18)  SpO2: 98% (05-09-18 @ 14:38) (96% - 98%)  Wt(kg): --  I&O's Summary    08 May 2018 07:01  -  09 May 2018 07:00  --------------------------------------------------------  IN: 480 mL / OUT: 0 mL / NET: 480 mL    09 May 2018 07:01  -  09 May 2018 15:32  --------------------------------------------------------  IN: 480 mL / OUT: 0 mL / NET: 480 mL        Appearance: Normal	  HEENT:   Normal oral mucosa, PERRL, EOMI	  Lymphatic: No lymphadenopathy  Cardiovascular: Normal S1 S2, No JVD, No murmurs, No edema  Respiratory: Lungs clear to auscultation	  Psychiatry: A & O x 3, Mood & affect appropriate  Gastrointestinal:  Soft, Non-tender, + BS	  Skin: No rashes, No ecchymoses, No cyanosis	  Neurologic: Non-focal  Extremities: Normal range of motion, No clubbing, cyanosis or edema  Vascular: Peripheral pulses palpable 2+ bilaterally    LABS:	 	    CBC Full  -  ( 09 May 2018 07:43 )  WBC Count : 14.44 K/uL  Hemoglobin : 10.5 g/dL  Hematocrit : 34.1 %  Platelet Count - Automated : 372 K/uL  Mean Cell Volume : 62.6 fl  Mean Cell Hemoglobin : 19.3 pg  Mean Cell Hemoglobin Concentration : 30.8 gm/dL  Auto Neutrophil # : x  Auto Lymphocyte # : x  Auto Monocyte # : x  Auto Eosinophil # : x  Auto Basophil # : x  Auto Neutrophil % : x  Auto Lymphocyte % : x  Auto Monocyte % : x  Auto Eosinophil % : x  Auto Basophil % : x    05-09    138  |  102  |  15  ----------------------------<  78  3.9   |  18<L>  |  1.02    Ca    9.0      09 May 2018 06:21  Phos  4.4     05-09  Mg     2.0     05-09      < from: Cardiac Cath Lab - Adult (05.04.18 @ 15:14) >   Hemodynamic assessment demonstrates normal pulmonary  capillary wedge pressure. There is severe pulmonary hypertension.    < end of copied text >    TPro  6.7  /  Alb  3.8  /  TBili  0.6  /  DBili  x   /  AST  11  /  ALT  8<L>  /  AlkPhos  36<L>  05-06    HgA1c: TSH: Thyroid Stimulating Hormone, Serum: 4.09 uIU/mL      TELEMETRY: inus tachycardia, 150-160 BPM  ECG:  sinus rhythm, RVH	    DIAGNOSTIC TESTING:  [ ] Echocardiogram: pending  [ ]  Catheterization:    [ ] CAT scan  < from: CT Angio Chest w/ IV Cont (05.02.18 @ 21:09) >  No evidence of pulmonary embolism.    Pulmonary hypertension with enlarged main pulmonary artery, right atrium   and ventricle. There is thickening of the right ventricular free wall    Interval resolution of previously seen airspace opacities,with residual   scattered bilateral groundglass opacities in a mosaic pattern, likely   secondary to pulmonary hypertension.    < end of copied text >      A/P: 37 y.o F w/ Pulmonary HTN,  GERD, Asthma on Home O2, chronic pulmonary embolism (dx 2014) on xarelto, admitted 05/01/2018 after progressive dyspnea, s/p recent admission at OSH for syncope where she was sitting on the bed, noted palpitations, and had sudden LOC (awoke on the floor after several minutes, no confusion, no loss of bowel control, no urinary incontinence, reports she was shaking). In the hospital CT-Angio done 05/02/2018 and negative for pulmonary embolism. Cardiology consulted for palpitations.    1. Syncope, Palpitations - Recent syncopal episode suspicious for cardiac arrythmia related syncope.  - CT-Angio negative on admission, cardiac enzyme negative x 1 on admission. s/p RHC 05/04/2018 with PA mean 56 mmHG.  -At rest her HR is 90's, however goes up to 110-150's with minimal exertion.  -Suspect dyspnea on exertion at this point is 2/2 severe pHTN.   -ST may be multifactorial in etiology (RV pressure overload in the setting of pHTN, anemia, deconditioning)  -Pending TTE   -Plan for outpatient [facility] with symptom journal for further evaluation if TTE is unrevealing  -Patient to follow up with her outpatient cardiologist Dr. Dangelo upon dischareg.    2. pHTN - Appreciate pulmonary management  -Cont midodrine, adempas as per pulmonary    3. hx of PE - Cont Xarelto     Thank you for this interesting consult. My team will continue to follow along with you.    John Hopkins MD  Cardiology Attending  [facility] / [facility]  Cell: [phone]  (Cardiology Nocturnist cell number available 7 PM -7 AM every night; available day time weekdays for follow-up only; day time weekends covered by general cardiology consult service) Cardiology Attending Progress Note    CHIEF COMPLAINT/REASON FOR CONSULT: Palptations    HISTORY OF PRESENT ILLNESS:    37 y.o F w/ Pulmonary HTN,  GERD, Asthma on Home O2, chronic pulmonary embolism (dx 2014) on xarelto, admitted 05/01/2018 after progressive dyspnea, s/p recent admission at OSH for syncope where she was sitting on the bed, noted palpitations, and had sudden LOC (awoke on the floor after several minutes, no confusion, no loss of bowel control, no urinary incontinence, reports she was shaking). In the hospital CT-Angio done 05/02/2018 and negative for pulmonary embolism. Cardiology consulted for palpitations.    INTERVAL EVENTS:   Patient seen and examined. Overall she states that she feels well except for dysnpea on exertion. No Chest Pain. No SOB. No HA/Dizziness. No Palpitations. No N/V/D/F/C.    Allergies    No Known Allergies    Intolerances    	    MEDICATIONS:  carvedilol 3.125 milliGRAM(s) Oral every 12 hours  midodrine 5 milliGRAM(s) Oral three times a day  rivaroxaban 15 milliGRAM(s) Oral every 12 hours  spironolactone 25 milliGRAM(s) Oral daily      benzonatate 100 milliGRAM(s) Oral three times a day  guaiFENesin    Syrup 100 milliGRAM(s) Oral every 6 hours PRN  ipratropium    for Nebulization 500 MICROGram(s) Nebulizer every 6 hours    acetaminophen   Tablet. 650 milliGRAM(s) Oral every 6 hours PRN    pantoprazole    Tablet 40 milliGRAM(s) Oral before breakfast      ferrous    sulfate 325 milliGRAM(s) Oral daily  multivitamin 1 Tablet(s) Oral daily      PAST MEDICAL & SURGICAL HISTORY:  PE (pulmonary thromboembolism): on coumadin  Asthma with status asthmaticus, unspecified asthma severity  Sinus tachycardia  Asthma  Pulmonary embolism  Pulmonary hypertension  Anemia: On home O2 at 2L via NC  Tachycardia  Pulmonary hypertension  No significant past surgical history  History of tubal ligation      FAMILY HISTORY:  No pertinent family history in first degree relatives  Family history of diabetes mellitus in mother  Family history of diabetes mellitus      SOCIAL HISTORY:    Never smoked, no ETOH, no IVDU    REVIEW OF SYSTEMS:    CONSTITUTIONAL: No weakness, fevers or chills  EYES/ENT: No visual changes;  No vertigo or throat pain   NECK: No pain or stiffness  RESPIRATORY: No cough, wheezing, hemoptysis; No shortness of breath  CARDIOVASCULAR: No chest pain or palpitations  GASTROINTESTINAL: No abdominal or epigastric pain. No nausea, vomiting, or hematemesis; No diarrhea or constipation. No melena or hematochezia.  GENITOURINARY: No dysuria, frequency or hematuria  NEUROLOGICAL: No numbness or weakness  SKIN: No itching, burning, rashes, or lesions   All other review of systems is negative unless indicated above.    PHYSICAL EXAM:  T(C): 37 (05-09-18 @ 11:13), Max: 37 (05-09-18 @ 11:13)  HR: 95 (05-09-18 @ 14:38) (95 - 121)  BP: 92/58 (05-09-18 @ 14:38) (92/58 - 111/74)  RR: 18 (05-09-18 @ 14:38) (18 - 18)  SpO2: 98% (05-09-18 @ 14:38) (96% - 98%)  Wt(kg): --  I&O's Summary    08 May 2018 07:01  -  09 May 2018 07:00  --------------------------------------------------------  IN: 480 mL / OUT: 0 mL / NET: 480 mL    09 May 2018 07:01  -  09 May 2018 15:32  --------------------------------------------------------  IN: 480 mL / OUT: 0 mL / NET: 480 mL        Appearance: Normal	  HEENT:   Normal oral mucosa, PERRL, EOMI	  Lymphatic: No lymphadenopathy  Cardiovascular: Normal S1 S2, No JVD, No murmurs, No edema  Respiratory: Lungs clear to auscultation	  Psychiatry: A & O x 3, Mood & affect appropriate  Gastrointestinal:  Soft, Non-tender, + BS	  Skin: No rashes, No ecchymoses, No cyanosis	  Neurologic: Non-focal  Extremities: Normal range of motion, No clubbing, cyanosis or edema  Vascular: Peripheral pulses palpable 2+ bilaterally    LABS:	 	    CBC Full  -  ( 09 May 2018 07:43 )  WBC Count : 14.44 K/uL  Hemoglobin : 10.5 g/dL  Hematocrit : 34.1 %  Platelet Count - Automated : 372 K/uL  Mean Cell Volume : 62.6 fl  Mean Cell Hemoglobin : 19.3 pg  Mean Cell Hemoglobin Concentration : 30.8 gm/dL  Auto Neutrophil # : x  Auto Lymphocyte # : x  Auto Monocyte # : x  Auto Eosinophil # : x  Auto Basophil # : x  Auto Neutrophil % : x  Auto Lymphocyte % : x  Auto Monocyte % : x  Auto Eosinophil % : x  Auto Basophil % : x    05-09    138  |  102  |  15  ----------------------------<  78  3.9   |  18<L>  |  1.02    Ca    9.0      09 May 2018 06:21  Phos  4.4     05-09  Mg     2.0     05-09      < from: Cardiac Cath Lab - Adult (05.04.18 @ 15:14) >   Hemodynamic assessment demonstrates normal pulmonary  capillary wedge pressure. There is severe pulmonary hypertension.    < end of copied text >    TPro  6.7  /  Alb  3.8  /  TBili  0.6  /  DBili  x   /  AST  11  /  ALT  8<L>  /  AlkPhos  36<L>  05-06    HgA1c: TSH: Thyroid Stimulating Hormone, Serum: 4.09 uIU/mL      TELEMETRY: inus tachycardia, 150-160 BPM  ECG:  sinus rhythm, RVH	    DIAGNOSTIC TESTING:  [ ] Echocardiogram: pending  [ ]  Catheterization:    [ ] CAT scan  < from: CT Angio Chest w/ IV Cont (05.02.18 @ 21:09) >  No evidence of pulmonary embolism.    Pulmonary hypertension with enlarged main pulmonary artery, right atrium   and ventricle. There is thickening of the right ventricular free wall    Interval resolution of previously seen airspace opacities,with residual   scattered bilateral groundglass opacities in a mosaic pattern, likely   secondary to pulmonary hypertension.    < end of copied text >      A/P: 37 y.o F w/ Pulmonary HTN,  GERD, Asthma on Home O2, chronic pulmonary embolism (dx 2014) on xarelto, admitted 05/01/2018 after progressive dyspnea, s/p recent admission at OSH for syncope where she was sitting on the bed, noted palpitations, and had sudden LOC (awoke on the floor after several minutes, no confusion, no loss of bowel control, no urinary incontinence, reports she was shaking). In the hospital CT-Angio done 05/02/2018 and negative for pulmonary embolism. Cardiology consulted for palpitations.    1. Syncope, Palpitations - Recent syncopal episode suspicious for cardiac arrythmia related syncope.  - CT-Angio negative on admission, cardiac enzyme negative x 1 on admission. s/p RHC 05/04/2018 with PA mean 56 mmHG.  -At rest her HR is 90's, however goes up to 110-150's with minimal exertion.  -Suspect dyspnea on exertion at this point is 2/2 severe pHTN.   -ST may be multifactorial in etiology (RV pressure overload in the setting of pHTN, anemia, deconditioning)  -Pending TTE   -Plan for outpatient [facility] with symptom journal for further evaluation if TTE is unrevealing  -Patient to follow up with her outpatient cardiologist Dr. Dangelo upon dischareg.    2. pHTN - Appreciate pulmonary management  -Cont midodrine, adempas as per pulmonary    3. hx of PE - Cont Xarelto     Thank you for this interesting consult. My team will continue to follow along with you.    John Hopkins MD  Cardiology Attending  [facility] / [facility]  Cell: [phone]  (Cardiology Nocturnist cell number available 7 PM -7 AM every night; available day time weekdays for follow-up only; day time weekends covered by general cardiology consult service)

## 2018-05-10 LAB
ANION GAP SERPL CALC-SCNC: 13 MMOL/L — SIGNIFICANT CHANGE UP (ref 5–17)
APTT BLD: 44.1 SEC — HIGH (ref 27.5–37.4)
BUN SERPL-MCNC: 14 MG/DL — SIGNIFICANT CHANGE UP (ref 7–23)
CALCIUM SERPL-MCNC: 9.2 MG/DL — SIGNIFICANT CHANGE UP (ref 8.4–10.5)
CHLORIDE SERPL-SCNC: 100 MMOL/L — SIGNIFICANT CHANGE UP (ref 96–108)
CO2 SERPL-SCNC: 22 MMOL/L — SIGNIFICANT CHANGE UP (ref 22–31)
CREAT SERPL-MCNC: 1.25 MG/DL — SIGNIFICANT CHANGE UP (ref 0.5–1.3)
GLUCOSE SERPL-MCNC: 87 MG/DL — SIGNIFICANT CHANGE UP (ref 70–99)
HCT VFR BLD CALC: 34.7 % — SIGNIFICANT CHANGE UP (ref 34.5–45)
HGB BLD-MCNC: 10.6 G/DL — LOW (ref 11.5–15.5)
MCHC RBC-ENTMCNC: 19.7 PG — LOW (ref 27–34)
MCHC RBC-ENTMCNC: 30.7 GM/DL — LOW (ref 32–36)
MCV RBC AUTO: 64.2 FL — LOW (ref 80–100)
PLATELET # BLD AUTO: 341 K/UL — SIGNIFICANT CHANGE UP (ref 150–400)
POTASSIUM SERPL-MCNC: 3.9 MMOL/L — SIGNIFICANT CHANGE UP (ref 3.5–5.3)
POTASSIUM SERPL-SCNC: 3.9 MMOL/L — SIGNIFICANT CHANGE UP (ref 3.5–5.3)
RBC # BLD: 5.4 M/UL — HIGH (ref 3.8–5.2)
RBC # FLD: 17.6 % — HIGH (ref 10.3–14.5)
SODIUM SERPL-SCNC: 135 MMOL/L — SIGNIFICANT CHANGE UP (ref 135–145)
WBC # BLD: 11.1 K/UL — HIGH (ref 3.8–10.5)
WBC # FLD AUTO: 11.1 K/UL — HIGH (ref 3.8–10.5)

## 2018-05-10 PROCEDURE — 99233 SBSQ HOSP IP/OBS HIGH 50: CPT | Mod: GC

## 2018-05-10 PROCEDURE — 99232 SBSQ HOSP IP/OBS MODERATE 35: CPT

## 2018-05-10 RX ORDER — HEPARIN SODIUM 5000 [USP'U]/ML
8000 INJECTION INTRAVENOUS; SUBCUTANEOUS EVERY 6 HOURS
Qty: 0 | Refills: 0 | Status: DISCONTINUED | OUTPATIENT
Start: 2018-05-10 | End: 2018-05-10

## 2018-05-10 RX ORDER — CARVEDILOL PHOSPHATE 80 MG/1
6.25 CAPSULE, EXTENDED RELEASE ORAL EVERY 12 HOURS
Qty: 0 | Refills: 0 | Status: DISCONTINUED | OUTPATIENT
Start: 2018-05-10 | End: 2018-05-12

## 2018-05-10 RX ORDER — HEPARIN SODIUM 5000 [USP'U]/ML
4000 INJECTION INTRAVENOUS; SUBCUTANEOUS EVERY 6 HOURS
Qty: 0 | Refills: 0 | Status: DISCONTINUED | OUTPATIENT
Start: 2018-05-10 | End: 2018-05-10

## 2018-05-10 RX ORDER — HEPARIN SODIUM 5000 [USP'U]/ML
INJECTION INTRAVENOUS; SUBCUTANEOUS
Qty: 25000 | Refills: 0 | Status: DISCONTINUED | OUTPATIENT
Start: 2018-05-10 | End: 2018-05-10

## 2018-05-10 RX ORDER — RIVAROXABAN 15 MG-20MG
15 KIT ORAL
Qty: 0 | Refills: 0 | Status: COMPLETED | OUTPATIENT
Start: 2018-05-10 | End: 2018-05-10

## 2018-05-10 RX ORDER — RIVAROXABAN 15 MG-20MG
20 KIT ORAL EVERY 24 HOURS
Qty: 0 | Refills: 0 | Status: DISCONTINUED | OUTPATIENT
Start: 2018-05-11 | End: 2018-05-11

## 2018-05-10 RX ORDER — HEPARIN SODIUM 5000 [USP'U]/ML
8000 INJECTION INTRAVENOUS; SUBCUTANEOUS ONCE
Qty: 0 | Refills: 0 | Status: DISCONTINUED | OUTPATIENT
Start: 2018-05-10 | End: 2018-05-10

## 2018-05-10 RX ADMIN — Medication 650 MILLIGRAM(S): at 08:40

## 2018-05-10 RX ADMIN — MIDODRINE HYDROCHLORIDE 5 MILLIGRAM(S): 2.5 TABLET ORAL at 21:24

## 2018-05-10 RX ADMIN — MIDODRINE HYDROCHLORIDE 5 MILLIGRAM(S): 2.5 TABLET ORAL at 14:16

## 2018-05-10 RX ADMIN — Medication 1 TABLET(S): at 12:20

## 2018-05-10 RX ADMIN — Medication 500 MICROGRAM(S): at 06:07

## 2018-05-10 RX ADMIN — RIVAROXABAN 15 MILLIGRAM(S): KIT at 08:29

## 2018-05-10 RX ADMIN — Medication 650 MILLIGRAM(S): at 08:00

## 2018-05-10 RX ADMIN — CARVEDILOL PHOSPHATE 6.25 MILLIGRAM(S): 80 CAPSULE, EXTENDED RELEASE ORAL at 18:00

## 2018-05-10 RX ADMIN — Medication 500 MICROGRAM(S): at 12:20

## 2018-05-10 RX ADMIN — MIDODRINE HYDROCHLORIDE 5 MILLIGRAM(S): 2.5 TABLET ORAL at 06:06

## 2018-05-10 RX ADMIN — Medication 650 MILLIGRAM(S): at 18:40

## 2018-05-10 RX ADMIN — Medication 100 MILLIGRAM(S): at 21:23

## 2018-05-10 RX ADMIN — Medication 650 MILLIGRAM(S): at 18:04

## 2018-05-10 RX ADMIN — RIVAROXABAN 15 MILLIGRAM(S): KIT at 18:00

## 2018-05-10 RX ADMIN — Medication 500 MICROGRAM(S): at 18:01

## 2018-05-10 RX ADMIN — Medication 325 MILLIGRAM(S): at 12:20

## 2018-05-10 RX ADMIN — PANTOPRAZOLE SODIUM 40 MILLIGRAM(S): 20 TABLET, DELAYED RELEASE ORAL at 06:07

## 2018-05-10 RX ADMIN — Medication 100 MILLIGRAM(S): at 06:07

## 2018-05-10 RX ADMIN — Medication 500 MICROGRAM(S): at 22:02

## 2018-05-10 RX ADMIN — SPIRONOLACTONE 25 MILLIGRAM(S): 25 TABLET, FILM COATED ORAL at 06:07

## 2018-05-10 RX ADMIN — CARVEDILOL PHOSPHATE 3.12 MILLIGRAM(S): 80 CAPSULE, EXTENDED RELEASE ORAL at 06:07

## 2018-05-10 RX ADMIN — Medication 100 MILLIGRAM(S): at 14:16

## 2018-05-10 NOTE — PROGRESS NOTE ADULT - SUBJECTIVE AND OBJECTIVE BOX
Esme Person MD (PGY-1)  Pager: [phone]  7AM-7PM  For emergent questions between 7PM and 7AM an on weekends after 12PM, please page [phone].      Patient is a 37y old  Female who presents with a chief complaint of pulmonary artery HTN work up (07 May 2018 20:35)      SUBJECTIVE / OVERNIGHT EVENTS:  No events overnight.    MEDICATIONS  (STANDING):  benzonatate 100 milliGRAM(s) Oral three times a day  carvedilol 3.125 milliGRAM(s) Oral every 12 hours  ferrous    sulfate 325 milliGRAM(s) Oral daily  ipratropium    for Nebulization 500 MICROGram(s) Nebulizer every 6 hours  midodrine 5 milliGRAM(s) Oral three times a day  multivitamin 1 Tablet(s) Oral daily  pantoprazole    Tablet 40 milliGRAM(s) Oral before breakfast  Riociguat (Adempas) 2 milliGRAM(s) 2 milliGRAM(s) Oral three times a day  rivaroxaban 15 milliGRAM(s) Oral every 12 hours  spironolactone 25 milliGRAM(s) Oral daily    MEDICATIONS  (PRN):  acetaminophen   Tablet. 650 milliGRAM(s) Oral every 6 hours PRN mild to moderate pain  guaiFENesin    Syrup 100 milliGRAM(s) Oral every 6 hours PRN Cough      Vital Signs:  Vital Signs Last 24 Hrs  T(C): 37.1 (10 May 2018 04:06), Max: 37.2 (09 May 2018 20:43)  T(F): 98.7 (10 May 2018 04:06), Max: 99 (09 May 2018 20:43)  HR: 104 (10 May 2018 06:02) (89 - 121)  BP: 107/64 (10 May 2018 06:02) (92/58 - 111/74)  BP(mean): --  RR: 18 (10 May 2018 04:06) (18 - 18)  SpO2: 99% (10 May 2018 04:06) (96% - 99%)  Daily     Daily   CAPILLARY BLOOD GLUCOSE        I&O's Summary    09 May 2018 07:01  -  10 May 2018 07:00  --------------------------------------------------------  IN: 1260 mL / OUT: 0 mL / NET: 1260 mL        PHYSICAL EXAM  Constitutional: Young female, NAD, resting comfortably in bed  Eyes: conjunctiva and sclera clear  Respiratory: Normal work of breathing, CTA B/L; no W/R/R, no retractions  Cardiac: Normal rate and regular rhythm, +S1/S2; no M/R/G  Gastrointestinal: soft, NT/ND; no rebound or guarding; +BSx4  Extremities: well perfused, no clubbing or cyanosis; no peripheral edema; 2+ peripheral pulses  Dermatologic: skin warm, dry and intact; no rashes, wounds, or scars  Neurologic: AAOx3; responding to questions appropriately, moving all extremities    LABS:                        10.5   14.44 )-----------( 372      ( 09 May 2018 07:43 )             34.1     05-09    138  |  102  |  15  ----------------------------<  78  3.9   |  18<L>  |  1.02    Ca    9.0      09 May 2018 06:21  Phos  4.4     05-09  Mg     2.0     05-09                RADIOLOGY & ADDITIONAL TESTS: Esme Person MD (PGY-1)  Pager: [phone]  7AM-7PM  For emergent questions between 7PM and 7AM an on weekends after 12PM, please page [phone].      Patient is a 37y old  Female who presents with a chief complaint of pulmonary artery HTN work up (07 May 2018 20:35)      SUBJECTIVE / OVERNIGHT EVENTS:  Continues to have tachycardia to 140s with minimal exertion. No sob, chest pain, abdominal pain.    MEDICATIONS  (STANDING):  benzonatate 100 milliGRAM(s) Oral three times a day  carvedilol 3.125 milliGRAM(s) Oral every 12 hours  ferrous    sulfate 325 milliGRAM(s) Oral daily  ipratropium    for Nebulization 500 MICROGram(s) Nebulizer every 6 hours  midodrine 5 milliGRAM(s) Oral three times a day  multivitamin 1 Tablet(s) Oral daily  pantoprazole    Tablet 40 milliGRAM(s) Oral before breakfast  Riociguat (Adempas) 2 milliGRAM(s) 2 milliGRAM(s) Oral three times a day  rivaroxaban 15 milliGRAM(s) Oral every 12 hours  spironolactone 25 milliGRAM(s) Oral daily    MEDICATIONS  (PRN):  acetaminophen   Tablet. 650 milliGRAM(s) Oral every 6 hours PRN mild to moderate pain  guaiFENesin    Syrup 100 milliGRAM(s) Oral every 6 hours PRN Cough      Vital Signs:  Vital Signs Last 24 Hrs  T(C): 37.1 (10 May 2018 04:06), Max: 37.2 (09 May 2018 20:43)  T(F): 98.7 (10 May 2018 04:06), Max: 99 (09 May 2018 20:43)  HR: 104 (10 May 2018 06:02) (89 - 121)  BP: 107/64 (10 May 2018 06:02) (92/58 - 111/74)  BP(mean): --  RR: 18 (10 May 2018 04:06) (18 - 18)  SpO2: 99% (10 May 2018 04:06) (96% - 99%)  Daily     Daily   CAPILLARY BLOOD GLUCOSE        I&O's Summary    09 May 2018 07:01  -  10 May 2018 07:00  --------------------------------------------------------  IN: 1260 mL / OUT: 0 mL / NET: 1260 mL        PHYSICAL EXAM  Constitutional: Young female, NAD, resting comfortably in bed  Eyes: conjunctiva and sclera clear  Respiratory: Normal work of breathing, CTA B/L; no W/R/R, no retractions  Cardiac: Normal rate and regular rhythm, +S1/S2; no M/R/G  Gastrointestinal: soft, NT/ND; no rebound or guarding; +BSx4  Extremities: well perfused, no clubbing or cyanosis; no peripheral edema; 2+ peripheral pulses  Dermatologic: skin warm, dry and intact; no rashes, wounds, or scars  Neurologic: AAOx3; responding to questions appropriately, moving all extremities      LABS:                        10.5   14.44 )-----------( 372      ( 09 May 2018 07:43 )             34.1     05-09    138  |  102  |  15  ----------------------------<  78  3.9   |  18<L>  |  1.02    Ca    9.0      09 May 2018 06:21  Phos  4.4     05-09  Mg     2.0     05-09                RADIOLOGY & ADDITIONAL TESTS:    < from: Transthoracic Echocardiogram (05.09.18 @ 16:06) >  Conclusions:  Suboptimal image quality.  1. Left ventricular cavity size appears small (left  ventricular internal diastolic dimension 3.4 cm).  2. Not all wall segments were well visualized.  Normal  overall left ventricular systolic function with an  estimated ejection fraction of 70%.  3. Normal diastolic function.  4. Right ventricular enlargement with decreased right  ventricular systolic function.  Flattening of the  interventricular septum in systole consistent with right  ventricular pressure overload.  5. Inadequate tricuspid regurgitation Doppler envelope  precludes estimation of RVSP.    < end of copied text > Esme Person MD (PGY-1)  Pager: [phone]  7AM-7PM  For emergent questions between 7PM and 7AM an on weekends after 12PM, please page [phone].      Patient is a 37y old  Female who presents with a chief complaint of pulmonary artery HTN work up (07 May 2018 20:35)      SUBJECTIVE / OVERNIGHT EVENTS:  Continues to have tachycardia to 140s with minimal exertion. No sob, chest pain, abdominal pain.    MEDICATIONS  (STANDING):  benzonatate 100 milliGRAM(s) Oral three times a day  carvedilol 3.125 milliGRAM(s) Oral every 12 hours  ferrous    sulfate 325 milliGRAM(s) Oral daily  ipratropium    for Nebulization 500 MICROGram(s) Nebulizer every 6 hours  midodrine 5 milliGRAM(s) Oral three times a day  multivitamin 1 Tablet(s) Oral daily  pantoprazole    Tablet 40 milliGRAM(s) Oral before breakfast  Riociguat (Adempas) 2 milliGRAM(s) 2 milliGRAM(s) Oral three times a day  rivaroxaban 15 milliGRAM(s) Oral every 12 hours  spironolactone 25 milliGRAM(s) Oral daily    MEDICATIONS  (PRN):  acetaminophen   Tablet. 650 milliGRAM(s) Oral every 6 hours PRN mild to moderate pain  guaiFENesin    Syrup 100 milliGRAM(s) Oral every 6 hours PRN Cough      Vital Signs:  Vital Signs Last 24 Hrs  T(C): 37.1 (10 May 2018 04:06), Max: 37.2 (09 May 2018 20:43)  T(F): 98.7 (10 May 2018 04:06), Max: 99 (09 May 2018 20:43)  HR: 104 (10 May 2018 06:02) (89 - 121)  BP: 107/64 (10 May 2018 06:02) (92/58 - 111/74)  BP(mean): --  RR: 18 (10 May 2018 04:06) (18 - 18)  SpO2: 99% (10 May 2018 04:06) (96% - 99%)  Daily     Daily   CAPILLARY BLOOD GLUCOSE        I&O's Summary    09 May 2018 07:01  -  10 May 2018 07:00  --------------------------------------------------------  IN: 1260 mL / OUT: 0 mL / NET: 1260 mL        PHYSICAL EXAM  Constitutional: Young female, NAD, resting comfortably in bed  Eyes: conjunctiva and sclera clear  Respiratory: Normal work of breathing, CTA B/L; no W/R/R, no retractions  Cardiac: Normal rate and regular rhythm, +S1/S2; no M/R/G  Gastrointestinal: soft, NT/ND; no rebound or guarding; +BSx4  Extremities: well perfused, no clubbing or cyanosis; no peripheral edema; 2+ peripheral pulses  Dermatologic: skin warm, dry and intact; no rashes, wounds, or scars  Neurologic: AAOx3; responding to questions appropriately, moving all extremities      LABS:                        10.5   14.44 )-----------( 372      ( 09 May 2018 07:43 )             34.1     05-09    138  |  102  |  15  ----------------------------<  78  3.9   |  18<L>  |  1.02    Ca    9.0      09 May 2018 06:21  Phos  4.4     05-09  Mg     2.0     05-09                RADIOLOGY & ADDITIONAL TESTS:    < from: Transthoracic Echocardiogram (05.09.18 @ 16:06) >  Conclusions:  Suboptimal image quality.  1. Left ventricular cavity size appears small (left  ventricular internal diastolic dimension 3.4 cm).  2. Not all wall segments were well visualized.  Normal  overall left ventricular systolic function with an  estimated ejection fraction of 70%.  3. Normal diastolic function.  4. Right ventricular enlargement with decreased right  ventricular systolic function.  Flattening of the  interventricular septum in systole consistent with right  ventricular pressure overload.  5. Inadequate tricuspid regurgitation Doppler envelope  precludes estimation of RVSP.    < end of copied text >    Personally discussed case with: Pulmonology fellow, Cardiology attending

## 2018-05-10 NOTE — PROGRESS NOTE ADULT - PROBLEM SELECTOR PLAN 1
Patient with sinus tachycardia up to 150s on ambulation, denies symptoms of dizziness or SOB, reports she can feel her heart beating fast, oxygen >97% on ambulation so hypoxia is not driving this process, TSH and orthstatic vitals WNL  - TTE with EF 75%, small LV, enlarged RV and decr RV systolic function c/w elevated pulmonary pressures  - Will start low dose coreg 3.125mg BID (patient experienced hallucinations while on metoprolol tartrate)  - On discharge will need [facility] monitor and f/u with Dr. Dangelo Patient with sinus tachycardia up to 150s on ambulation likely secondary to RV pressure overload 2/2 pHTN and component of deconditioning  - TTE 05/09/2018 with RV enlargement and RV dysfunction, flattening of the septum c/w RV pressure overload  - Incr coreg to 6.25mg BID (patient experienced hallucinations while on metoprolol tartrate)  - On discharge will need [facility] monitor and f/u with Dr. Dangelo

## 2018-05-10 NOTE — PROGRESS NOTE ADULT - PROBLEM SELECTOR PLAN 2
Pt was dx'd in 2014 with pulmonary HTN w/ features of Class I and Class IV as per a pulmonary angiogram done at [facility]. S/p RHC 5/4 showing improved mPAP (56mmHg) and CO as compared to 2014  - c/w adempas TID  - will add midodrine 5mg TID for borderline hypotension on adempas  - Pulmonology following, recommendations appreciated Pt was dx'd in 2014 with pulmonary HTN w/ features of Class I and Class IV as per a pulmonary angiogram done at [facility]. S/p RHC 5/4 showing improved mPAP (56mmHg) and CO as compared to 2014  - c/w adempas TID and spironolactone  - will add midodrine 5mg TID for borderline hypotension on adempas  - Pulmonology following, recommendations appreciated

## 2018-05-10 NOTE — PROGRESS NOTE ADULT - ASSESSMENT
36 y/o female with PMHx of pulm htn w/ features of WHO Group I and IV.  Remains tachycardic with minimal exertion, which is related to her pHTN    -- c/w Adempas with Midodrine for BP support  -- c/w BB for HR control  -- may need IV agents for vasodilation - Dr Yoon to discuss with patient      will discuss with Dr Car Holman  Pulmonary Fellow  p [phone]

## 2018-05-10 NOTE — PROGRESS NOTE ADULT - SUBJECTIVE AND OBJECTIVE BOX
Interval Events:  Patient seen and examined at bedside this morning.   Tolerating Adempas w midodrine  Started on low dose BB with persistent tachycardia on minimal exertion      REVIEW OF SYSTEMS:  Constitutional: relative hypotension  CV:  + palpitations with ambulation  Resp:  stable  GI: no abd pain      [x ] All other systems negative  [ ] Unable to assess ROS because ________    OBJECTIVE:      ICU Vital Signs Last 24 Hrs  T(C): 37.1 (10 May 2018 04:06), Max: 37.2 (09 May 2018 20:43)  T(F): 98.7 (10 May 2018 04:06), Max: 99 (09 May 2018 20:43)  HR: 104 (10 May 2018 06:02) (89 - 121)  BP: 107/64 (10 May 2018 06:02) (92/58 - 111/74)  BP(mean): --  ABP: --  ABP(mean): --  RR: 18 (10 May 2018 04:06) (18 - 18)  SpO2: 99% (10 May 2018 04:06) (96% - 99%)        05-09 @ 07:01  -  05-10 @ 07:00  --------------------------------------------------------  IN: 1260 mL / OUT: 0 mL / NET: 1260 mL      CAPILLARY BLOOD GLUCOSE        PHYSICAL EXAM:  General: Well appearing Female. No apparent distress  HEENT:   Mucous membranes are moist.  Neck: Supple. No JVD  Respiratory: clear  Cardiovascular: RRR, no m/r/g  Abdomen: Soft, non-tender, non-distended.   Extremities: No lower extremity edema.  Skin: Warm, dry, no rash.   Neurological: CNII-XII grossly intact.   Psychiatry: appropriate mood and affect.     HOSPITAL MEDICATIONS:      HOSPITAL MEDICATIONS:      HOSPITAL MEDICATIONS:  MEDICATIONS  (STANDING):  benzonatate 100 milliGRAM(s) Oral three times a day  carvedilol 3.125 milliGRAM(s) Oral every 12 hours  ferrous    sulfate 325 milliGRAM(s) Oral daily  ipratropium    for Nebulization 500 MICROGram(s) Nebulizer every 6 hours  midodrine 5 milliGRAM(s) Oral three times a day  multivitamin 1 Tablet(s) Oral daily  pantoprazole    Tablet 40 milliGRAM(s) Oral before breakfast  Riociguat (Adempas) 2 milliGRAM(s) 2 milliGRAM(s) Oral three times a day  rivaroxaban 15 milliGRAM(s) Oral every 12 hours  spironolactone 25 milliGRAM(s) Oral daily    MEDICATIONS  (PRN):  acetaminophen   Tablet. 650 milliGRAM(s) Oral every 6 hours PRN mild to moderate pain  guaiFENesin    Syrup 100 milliGRAM(s) Oral every 6 hours PRN Cough      LABS:                        10.5   14.44 )-----------( 372      ( 09 May 2018 07:43 )             34.1     05-09    138  |  102  |  15  ----------------------------<  78  3.9   |  18<L>  |  1.02    Ca    9.0      09 May 2018 06:21  Phos  4.4     05-09  Mg     2.0     05-09      RADIOLOGY:  [ x ] Reviewed and interpreted by me    < from: Cardiac Cath Lab - Adult (05.04.18 @ 15:14) >  Pressures:  -- Pulmonary Artery (S/D/M): 88/31/56  Pressures:  -- Pulmonary Capillary Wedge: 10/10/6  Pressures:  -- Right Atrium (a/v/M): 13/4/4  Pressures:  -- Right Ventricle (s/edp): 90/14/--    O2 Sats:  -- AO: 9.8/97/12.93  O2 Sats:  -- PA: 9.8/61.2/8.16  Outputs:  Baseline  Outputs:  -- OUTPUTS: CO by Xenia: 5.76  Outputs:  < end of copied text >    < from: Transthoracic Echocardiogram (08.17.16 @ 22:14) >  1. Normal left ventricular internal dimensions and wall  thicknesses.  2. Hyperdynamic left ventricle. Flattening of the  interventricular septum in both systole and diastole is  consistent with right ventricular pressure overload.  3. Decreased right ventricular systolic function.  4. Trace pericardial effusion.    < end of copied text >

## 2018-05-10 NOTE — PROGRESS NOTE ADULT - SUBJECTIVE AND OBJECTIVE BOX
Cardiology Attending Progress Note    CHIEF COMPLAINT/REASON FOR CONSULT: Palptations    HISTORY OF PRESENT ILLNESS:    37 y.o F w/ Pulmonary HTN,  GERD, Asthma on Home O2, chronic pulmonary embolism (dx 2014) on xarelto, admitted 05/01/2018 after progressive dyspnea, s/p recent admission at OSH for syncope where she was sitting on the bed, noted palpitations, and had sudden LOC (awoke on the floor after several minutes, no confusion, no loss of bowel control, no urinary incontinence, reports she was shaking). In the hospital CT-Angio done 05/02/2018 and negative for pulmonary embolism. Cardiology consulted for palpitations.    INTERVAL EVENTS:   Patient seen and examined. Overall she states that she feels well except for dysnpea on exertion. No Chest Pain. No SOB. No HA/Dizziness. No Palpitations. No N/V/D/F/C.    Allergies    No Known Allergies    Intolerances    	    MEDICATIONS:  carvedilol 3.125 milliGRAM(s) Oral every 12 hours  midodrine 5 milliGRAM(s) Oral three times a day  rivaroxaban 15 milliGRAM(s) Oral every 12 hours  spironolactone 25 milliGRAM(s) Oral daily      benzonatate 100 milliGRAM(s) Oral three times a day  guaiFENesin    Syrup 100 milliGRAM(s) Oral every 6 hours PRN  ipratropium    for Nebulization 500 MICROGram(s) Nebulizer every 6 hours    acetaminophen   Tablet. 650 milliGRAM(s) Oral every 6 hours PRN    pantoprazole    Tablet 40 milliGRAM(s) Oral before breakfast      ferrous    sulfate 325 milliGRAM(s) Oral daily  multivitamin 1 Tablet(s) Oral daily      PAST MEDICAL & SURGICAL HISTORY:  PE (pulmonary thromboembolism): on coumadin  Asthma with status asthmaticus, unspecified asthma severity  Sinus tachycardia  Asthma  Pulmonary embolism  Pulmonary hypertension  Anemia: On home O2 at 2L via NC  Tachycardia  Pulmonary hypertension  No significant past surgical history  History of tubal ligation      FAMILY HISTORY:  No pertinent family history in first degree relatives  Family history of diabetes mellitus in mother  Family history of diabetes mellitus      SOCIAL HISTORY:    Never smoked, no ETOH, no IVDU    REVIEW OF SYSTEMS:    CONSTITUTIONAL: No weakness, fevers or chills  EYES/ENT: No visual changes;  No vertigo or throat pain   NECK: No pain or stiffness  RESPIRATORY: No cough, wheezing, hemoptysis; +shortness of breath  CARDIOVASCULAR: No chest pain or palpitations  GASTROINTESTINAL: No abdominal or epigastric pain. No nausea, vomiting, or hematemesis; No diarrhea or constipation. No melena or hematochezia.  GENITOURINARY: No dysuria, frequency or hematuria  NEUROLOGICAL: No numbness or weakness  SKIN: No itching, burning, rashes, or lesions   All other review of systems is negative unless indicated above.    PHYSICAL EXAM:  T(C): 37.1 (05-10-18 @ 04:06), Max: 37.2 (05-09-18 @ 20:43)  HR: 104 (05-10-18 @ 06:02) (89 - 104)  BP: 107/64 (05-10-18 @ 06:02) (92/58 - 107/64)  RR: 18 (05-10-18 @ 04:06) (18 - 18)  SpO2: 99% (05-10-18 @ 04:06) (98% - 99%)  Wt(kg): --  I&O's Summary    09 May 2018 07:01  -  10 May 2018 07:00  --------------------------------------------------------  IN: 1260 mL / OUT: 0 mL / NET: 1260 mL        Appearance: Normal	  HEENT:   Normal oral mucosa, PERRL, EOMI	  Lymphatic: No lymphadenopathy  Cardiovascular: Normal S1 S2, No JVD, No murmurs, No edema  Respiratory: Lungs clear to auscultation	  Psychiatry: A & O x 3, Mood & affect appropriate  Gastrointestinal:  Soft, Non-tender, + BS	  Skin: No rashes, No ecchymoses, No cyanosis	  Neurologic: Non-focal  Extremities: Normal range of motion, No clubbing, cyanosis or edema  Vascular: Peripheral pulses palpable 2+ bilaterally    LABS:	 	    CBC Full  -  ( 10 May 2018 10:43 )  WBC Count : 11.1 K/uL  Hemoglobin : 10.6 g/dL  Hematocrit : 34.7 %  Platelet Count - Automated : 341 K/uL  Mean Cell Volume : 64.2 fl  Mean Cell Hemoglobin : 19.7 pg  Mean Cell Hemoglobin Concentration : 30.7 gm/dL  Auto Neutrophil # : x  Auto Lymphocyte # : x  Auto Monocyte # : x  Auto Eosinophil # : x  Auto Basophil # : x  Auto Neutrophil % : x  Auto Lymphocyte % : x  Auto Monocyte % : x  Auto Eosinophil % : x  Auto Basophil % : x    05-10    135  |  100  |  14  ----------------------------<  87  3.9   |  22  |  1.25  05-09    138  |  102  |  15  ----------------------------<  78  3.9   |  18<L>  |  1.02    Ca    9.2      10 May 2018 10:43  Ca    9.0      09 May 2018 06:21  Phos  4.4     05-09  Mg     2.0     05-09      < from: Cardiac Cath Lab - Adult (05.04.18 @ 15:14) >   Hemodynamic assessment demonstrates normal pulmonary  capillary wedge pressure. There is severe pulmonary hypertension.    < end of copied text >    TPro  6.7  /  Alb  3.8  /  TBili  0.6  /  DBili  x   /  AST  11  /  ALT  8<L>  /  AlkPhos  36<L>  05-06    HgA1c: TSH: Thyroid Stimulating Hormone, Serum: 4.09 uIU/mL      TELEMETRY: sinus tachycardia,  at rest, 150-160 BPM with exertion  ECG:  sinus rhythm, RVH	    DIAGNOSTIC TESTING:  [ ] Echocardiogram: pending  [ ]  Catheterization:    [ ] CAT scan  < from: CT Angio Chest w/ IV Cont (05.02.18 @ 21:09) >  No evidence of pulmonary embolism.    Pulmonary hypertension with enlarged main pulmonary artery, right atrium   and ventricle. There is thickening of the right ventricular free wall    Interval resolution of previously seen airspace opacities,with residual   scattered bilateral groundglass opacities in a mosaic pattern, likely   secondary to pulmonary hypertension.    < end of copied text >    TTE: 05/09/2018:  EF (Visual Estimate): 70 %  Doppler Peak Velocity (m/sec): AoV=1.0 PV=1.3  ------------------------------------------------------------------------  Observations:  Mitral Valve: Normal mitral valve structure. Mild mitral  regurgitation.  Aortic Valve/Aorta: Tricuspid aortic valve with normal cusp  excursion. Peak transaortic valve gradient equals 4 mm Hg.  No aortic valve regurgitation seen. Peak left ventricular  outflow tract gradient equals 3 mm Hg.  Aortic Root (SoV): 3.3 cm.  Left Atrium: Normal left atrium.  Left Ventricle: Not all wall segments were well visualized.   Normal overall left ventricular systolic function with an  estimated ejection fraction of 70%. Left ventricular cavity  size appears small (left ventricular internal diastolic  dimension 3.4 cm). Normal diastolic function.  Right Heart: Right atrium not well visualized. Right  ventricular enlargement with decreased right ventricular  systolic function.  Flattening of the interventricular  septum in systole consistent with right ventricular  pressure overload. Normal tricuspid valve structure.  Minimal tricuspid regurgitation. No pulmonic stenosis. Mild  pulmonic regurgitation.  Pericardium/Pleura: No pericardial effusion seen.  Hemodynamic: Inadequate tricuspid regurgitation Doppler  envelope precludes estimation of RVSP.  ------------------------------------------------------------------------  Conclusions:  Suboptimal image quality.  1. Left ventricular cavity size appears small (left  ventricular internal diastolic dimension 3.4 cm).  2. Not all wall segments were well visualized.  Normal  overall left ventricular systolic function with an  estimated ejection fraction of 70%.  3. Normal diastolic function.  4. Right ventricular enlargement with decreased right  ventricular systolic function.  Flattening of the  interventricular septum in systole consistent with right  ventricular pressure overload.  5. Inadequate tricuspid regurgitation Doppler envelope  precludes estimation of RVSP.    A/P: 37 y.o F w/ Pulmonary HTN,  GERD, Asthma on Home O2, chronic pulmonary embolism (dx 2014) on xarelto, admitted 05/01/2018 after progressive dyspnea, s/p recent admission at OSH for syncope where she was sitting on the bed, noted palpitations, and had sudden LOC (awoke on the floor after several minutes, no confusion, no loss of bowel control, no urinary incontinence, reports she was shaking). In the hospital CT-Angio done 05/02/2018 and negative for pulmonary embolism. Cardiology consulted for palpitations.    1. Syncope, Palpitations - Recent syncopal episode suspicious for cardiac arrythmia related syncope.  - CT-Angio negative on admission, cardiac enzyme negative x 1 on admission. s/p RHC 05/04/2018 with PA mean 56 mmHG.  -At rest her HR is 90's, however goes up to 110-150's with minimal exertion.  -Suspect dyspnea on exertion at this point is 2/2 severe pHTN.   -ST may be multifactorial in etiology (RV pressure overload in the setting of pHTN, anemia, deconditioning)  -Plan for outpatient [facility] with symptom journal as outpatient  -Increase Coreg to 6.25 mg po BID as below    2. pHTN - Appreciate pulmonary management  -Cont midodrine, adempas as per pulmonary    3. HFpEF - TTE 05/09/2018 with RV enlargement and RV dysfunction, flattening of the septum c/w RV pressure overload.  -Likely driven by pHTN  -Appears relatively well compensated, euvolemic at this time.  -Increase Carvedilol to 6.25 mg po BID  -Cont Spironolactone 25 mg po QD    4. hx of PE - Cont Xarelto     Thank you for this interesting consult. My team will continue to follow along with you.  -Patient to follow up with her outpatient cardiologist Dr. Dangelo upon discharge.    John Hopkins MD  Cardiology Attending  [facility]  Cell: [phone]  (Cardiology Nocturnist cell number available 7 PM -7 AM every night; available day time weekdays for follow-up only; day time weekends covered by general cardiology consult service)

## 2018-05-10 NOTE — PROGRESS NOTE ADULT - ATTENDING COMMENTS
Agree with above.  Patient still to decide whether or not to go on flovent.  Increase beta blocker, though unsure utility if driving force is going to be the pulmonary HTN and pressure overload on right side.  contineu to monitor patient, per pulm not safe for discharge at this time.

## 2018-05-10 NOTE — PROGRESS NOTE ADULT - ASSESSMENT
37 F w/ pulmonary HTN w/ Class I/IV features (dx in 2014), chronic asthma, chronic PE on Xarelto, GERD p/w dyspnea at rest now s/p RHC found to have improvement in mPAP and cardiac output restarted on home pHTN meds now found to have persistent hypotension with sinus tachycardia on exertion. Transferred to [facility] for tele monitoring.

## 2018-05-11 PROCEDURE — 99232 SBSQ HOSP IP/OBS MODERATE 35: CPT

## 2018-05-11 PROCEDURE — 99233 SBSQ HOSP IP/OBS HIGH 50: CPT | Mod: GC

## 2018-05-11 PROCEDURE — 99232 SBSQ HOSP IP/OBS MODERATE 35: CPT | Mod: GC

## 2018-05-11 RX ORDER — HEPARIN SODIUM 5000 [USP'U]/ML
INJECTION INTRAVENOUS; SUBCUTANEOUS
Qty: 25000 | Refills: 0 | Status: DISCONTINUED | OUTPATIENT
Start: 2018-05-11 | End: 2018-05-13

## 2018-05-11 RX ORDER — HEPARIN SODIUM 5000 [USP'U]/ML
4000 INJECTION INTRAVENOUS; SUBCUTANEOUS EVERY 6 HOURS
Qty: 0 | Refills: 0 | Status: DISCONTINUED | OUTPATIENT
Start: 2018-05-11 | End: 2018-05-13

## 2018-05-11 RX ORDER — HEPARIN SODIUM 5000 [USP'U]/ML
8000 INJECTION INTRAVENOUS; SUBCUTANEOUS EVERY 6 HOURS
Qty: 0 | Refills: 0 | Status: DISCONTINUED | OUTPATIENT
Start: 2018-05-11 | End: 2018-05-13

## 2018-05-11 RX ORDER — MIDODRINE HYDROCHLORIDE 2.5 MG/1
10 TABLET ORAL THREE TIMES A DAY
Qty: 0 | Refills: 0 | Status: DISCONTINUED | OUTPATIENT
Start: 2018-05-11 | End: 2018-05-13

## 2018-05-11 RX ADMIN — Medication 650 MILLIGRAM(S): at 23:35

## 2018-05-11 RX ADMIN — CARVEDILOL PHOSPHATE 6.25 MILLIGRAM(S): 80 CAPSULE, EXTENDED RELEASE ORAL at 05:59

## 2018-05-11 RX ADMIN — Medication 650 MILLIGRAM(S): at 09:47

## 2018-05-11 RX ADMIN — Medication 325 MILLIGRAM(S): at 13:29

## 2018-05-11 RX ADMIN — Medication 650 MILLIGRAM(S): at 15:47

## 2018-05-11 RX ADMIN — MIDODRINE HYDROCHLORIDE 10 MILLIGRAM(S): 2.5 TABLET ORAL at 21:37

## 2018-05-11 RX ADMIN — PANTOPRAZOLE SODIUM 40 MILLIGRAM(S): 20 TABLET, DELAYED RELEASE ORAL at 06:00

## 2018-05-11 RX ADMIN — SPIRONOLACTONE 25 MILLIGRAM(S): 25 TABLET, FILM COATED ORAL at 06:00

## 2018-05-11 RX ADMIN — Medication 500 MICROGRAM(S): at 06:00

## 2018-05-11 RX ADMIN — Medication 100 MILLIGRAM(S): at 13:29

## 2018-05-11 RX ADMIN — MIDODRINE HYDROCHLORIDE 5 MILLIGRAM(S): 2.5 TABLET ORAL at 13:29

## 2018-05-11 RX ADMIN — Medication 650 MILLIGRAM(S): at 08:26

## 2018-05-11 RX ADMIN — Medication 500 MICROGRAM(S): at 23:34

## 2018-05-11 RX ADMIN — Medication 650 MILLIGRAM(S): at 14:39

## 2018-05-11 RX ADMIN — Medication 1 TABLET(S): at 13:29

## 2018-05-11 RX ADMIN — Medication 100 MILLIGRAM(S): at 21:37

## 2018-05-11 RX ADMIN — Medication 500 MICROGRAM(S): at 13:29

## 2018-05-11 RX ADMIN — Medication 100 MILLIGRAM(S): at 05:59

## 2018-05-11 RX ADMIN — MIDODRINE HYDROCHLORIDE 5 MILLIGRAM(S): 2.5 TABLET ORAL at 05:59

## 2018-05-11 RX ADMIN — CARVEDILOL PHOSPHATE 6.25 MILLIGRAM(S): 80 CAPSULE, EXTENDED RELEASE ORAL at 18:17

## 2018-05-11 RX ADMIN — Medication 500 MICROGRAM(S): at 18:17

## 2018-05-11 NOTE — PROGRESS NOTE ADULT - ASSESSMENT
37 F w/ pulmonary HTN w/ Class I/IV features (dx in 2014), chronic asthma, chronic PE on Xarelto, GERD p/w dyspnea at rest now s/p RHC found to have improvement in mPAP and cardiac output restarted on home pHTN meds now found to have persistent hypotension with sinus tachycardia on exertion. Transferred to [facility] for tele monitoring.

## 2018-05-11 NOTE — PROGRESS NOTE ADULT - ATTENDING COMMENTS
PAH Group 1 and 4.  Plan for Flolan titration in ICU over the weekend. Please hold xarelto and start heparin gtt, increase midodrine.

## 2018-05-11 NOTE — PROGRESS NOTE ADULT - PROBLEM SELECTOR PLAN 1
Patient with sinus tachycardia up to 150s on ambulation likely secondary to RV pressure overload 2/2 pHTN and component of deconditioning  - TTE 05/09/2018 with RV enlargement and RV dysfunction, flattening of the septum c/w RV pressure overload  - C/w coreg to 6.25mg BID (patient experienced hallucinations while on metoprolol tartrate)  - On discharge will need [facility] monitor and f/u with Dr. Dangelo

## 2018-05-11 NOTE — PROGRESS NOTE ADULT - SUBJECTIVE AND OBJECTIVE BOX
SUBJ:  Feeling well this afternoon. Rates ranging 70s to some episodes' of 110s while on carvedilol.  Denies chest pain and shortness of breath. Positive headache.     MEDICATIONS  (STANDING):  benzonatate 100 milliGRAM(s) Oral three times a day  carvedilol 6.25 milliGRAM(s) Oral every 12 hours  ferrous    sulfate 325 milliGRAM(s) Oral daily  heparin  Infusion.  Unit(s)/Hr (18 mL/Hr) IV Continuous <Continuous>  ipratropium    for Nebulization 500 MICROGram(s) Nebulizer every 6 hours  midodrine 10 milliGRAM(s) Oral three times a day  multivitamin 1 Tablet(s) Oral daily  pantoprazole    Tablet 40 milliGRAM(s) Oral before breakfast  Riociguat (Adempas) 2 milliGRAM(s) 2 milliGRAM(s) Oral three times a day  spironolactone 25 milliGRAM(s) Oral daily    MEDICATIONS  (PRN):  acetaminophen   Tablet. 650 milliGRAM(s) Oral every 6 hours PRN mild to moderate pain  guaiFENesin    Syrup 100 milliGRAM(s) Oral every 6 hours PRN Cough  heparin  Injectable 8000 Unit(s) IV Push every 6 hours PRN For aPTT less than 40  heparin  Injectable 4000 Unit(s) IV Push every 6 hours PRN For aPTT between 40 - 57    Vital Signs Last 24 Hrs  T(C): 36.6 (11 May 2018 20:09), Max: 36.7 (11 May 2018 11:46)  T(F): 97.9 (11 May 2018 20:09), Max: 98 (11 May 2018 11:46)  HR: 69 (11 May 2018 21:35) (69 - 94)  BP: 110/73 (11 May 2018 21:35) (91/62 - 110/73)  BP(mean): 79 (11 May 2018 20:09) (79 - 79)  RR: 18 (11 May 2018 20:09) (18 - 18)  SpO2: 98% (11 May 2018 20:09) (96% - 100%)    REVIEW OF SYSTEMS:  As per HPI, otherwise unremarkable.     PHYSICAL EXAM:  Appearance: Normal	  HEENT:   Normal oral mucosa	  Lymphatic: No lymphadenopathy  Cardiovascular: Normal S1 S2,   Respiratory: Lungs clear to auscultation	  Psychiatry: A & O x 3, Mood & affect appropriate  Gastrointestinal:  Soft, Non-tender  Skin: No rashes, No ecchymoses,  Neurologic: Non-focal  Extremities: Normal range of motion, No clubbing, cyanosis   Vascular: Peripheral pulses palpable 2+ bilaterally	    TELEMETRY: rates 70s - 110s.     LABS:   CBC Full  -  ( 10 May 2018 10:43 )  WBC Count : 11.1 K/uL  Hemoglobin : 10.6 g/dL  Hematocrit : 34.7 %  Platelet Count - Automated : 341 K/uL  Mean Cell Volume : 64.2 fl  Mean Cell Hemoglobin : 19.7 pg  Mean Cell Hemoglobin Concentration : 30.7 gm/dL    05-10    135  |  100  |  14  ----------------------------<  87  3.9   |  22  |  1.25    Ca    9.2      10 May 2018 10:43    IMPRESSION AND PLAN:  37 y.o F w/ Pulmonary HTN,  GERD, Asthma on Home O2, chronic pulmonary embolism (dx 2014) on xarelto, admitted 05/01/2018 after progressive dyspnea, s/p recent admission at OSH for syncope where she was sitting on the bed, noted palpitations, and had sudden LOC (awoke on the floor after several minutes, no confusion, no loss of bowel control, no urinary incontinence, reports she was shaking). In the hospital CT-Angio done 05/02/2018 and negative for pulmonary embolism. Cardiology consulted for palpitations.     1. Syncope, Palpitations   Recent syncopal episode suspicious for cardiac arrythmia related syncope.  CT-Angio negative on admission, cardiac enzyme negative x 1 on admission. s/p RHC 05/04/2018 with PA mean 56 mmHG.  Rest HR is 90's, however goes up to 110-150's with minimal exertion.  Dyspnea on exertion at this point is 2/2 severe pHTN.     ·	Continue Coreg to 6.25 mg po BID  ·	Plan for outpatient [facility] with symptom journal as outpatient    2. pHTN  PA mean 56 mmHG.    ·	Appreciate pulmonary management  ·	Cont midodrine, adempas as per pulmonary    3. HFpEF  TTE 05/09/2018 with RV enlargement and RV dysfunction, flattening of the septum c/w RV pressure overload.  Secondary to pHTN  Appears relatively well compensated, euvolemic at this time.    ·	Continue Carvedilol to 6.25 mg po BID, Spironolactone 25 mg po QD    4. hx of PE     ·	Cont Xarelto     Follow up outpatient with Dr. Dangelo. No further cardiac recommendations at this time. Continue BB and defer to pulmonary for more aggressive pHTN medical management. Please call with specific questions [phone]. Signing off.     Mari Guerra MD, MPH, SHEBA  Cardiovascular Specialist Attending  [facility]  C: [phone]  E: [email or web address]  (Cardiology nocturnist available every night 7 pm - 7 am; available week days for follow-up; general cardiology consult coverage weekend days)

## 2018-05-11 NOTE — PROGRESS NOTE ADULT - PROBLEM SELECTOR PLAN 2
Pt was dx'd in 2014 with pulmonary HTN w/ features of Class I and Class IV as per a pulmonary angiogram done at [facility]. S/p RHC 5/4 showing improved mPAP (56mmHg) and CO as compared to 2014  - c/w adempas TID and spironolactone  - c/w midodrine 5mg TID for borderline hypotension on adempas  - Pulmonology following, recommendations appreciated

## 2018-05-11 NOTE — PROGRESS NOTE ADULT - SUBJECTIVE AND OBJECTIVE BOX
Interval Events:  Patient seen and examined at bedside this morning.   Tolerating Adempas w midodrine  Started on low dose BB with persistent tachycardia on minimal exertion      REVIEW OF SYSTEMS:  Constitutional: relative hypotension  CV:  + palpitations with ambulation  Resp:  stable  GI: no abd pain      [x ] All other systems negative  [ ] Unable to assess ROS because ________    OBJECTIVE:      ICU Vital Signs Last 24 Hrs  T(C): 37.1 (10 May 2018 04:06), Max: 37.2 (09 May 2018 20:43)  T(F): 98.7 (10 May 2018 04:06), Max: 99 (09 May 2018 20:43)  HR: 104 (10 May 2018 06:02) (89 - 121)  BP: 107/64 (10 May 2018 06:02) (92/58 - 111/74)  BP(mean): --  ABP: --  ABP(mean): --  RR: 18 (10 May 2018 04:06) (18 - 18)  SpO2: 99% (10 May 2018 04:06) (96% - 99%)        05-09 @ 07:01  -  05-10 @ 07:00  --------------------------------------------------------  IN: 1260 mL / OUT: 0 mL / NET: 1260 mL      CAPILLARY BLOOD GLUCOSE        PHYSICAL EXAM:  General: Well appearing Female. No apparent distress  HEENT:   Mucous membranes are moist.  Neck: Supple. No JVD  Respiratory: clear  Cardiovascular: RRR, no m/r/g  Abdomen: Soft, non-tender, non-distended.   Extremities: No lower extremity edema.  Skin: Warm, dry, no rash.   Neurological: CNII-XII grossly intact.   Psychiatry: appropriate mood and affect.     HOSPITAL MEDICATIONS:      HOSPITAL MEDICATIONS:      HOSPITAL MEDICATIONS:  MEDICATIONS  (STANDING):  benzonatate 100 milliGRAM(s) Oral three times a day  carvedilol 3.125 milliGRAM(s) Oral every 12 hours  ferrous    sulfate 325 milliGRAM(s) Oral daily  ipratropium    for Nebulization 500 MICROGram(s) Nebulizer every 6 hours  midodrine 5 milliGRAM(s) Oral three times a day  multivitamin 1 Tablet(s) Oral daily  pantoprazole    Tablet 40 milliGRAM(s) Oral before breakfast  Riociguat (Adempas) 2 milliGRAM(s) 2 milliGRAM(s) Oral three times a day  rivaroxaban 15 milliGRAM(s) Oral every 12 hours  spironolactone 25 milliGRAM(s) Oral daily    MEDICATIONS  (PRN):  acetaminophen   Tablet. 650 milliGRAM(s) Oral every 6 hours PRN mild to moderate pain  guaiFENesin    Syrup 100 milliGRAM(s) Oral every 6 hours PRN Cough      LABS:                        10.5   14.44 )-----------( 372      ( 09 May 2018 07:43 )             34.1     05-09    138  |  102  |  15  ----------------------------<  78  3.9   |  18<L>  |  1.02    Ca    9.0      09 May 2018 06:21  Phos  4.4     05-09  Mg     2.0     05-09      RADIOLOGY:  [ x ] Reviewed and interpreted by me    < from: Cardiac Cath Lab - Adult (05.04.18 @ 15:14) >  Pressures:  -- Pulmonary Artery (S/D/M): 88/31/56  Pressures:  -- Pulmonary Capillary Wedge: 10/10/6  Pressures:  -- Right Atrium (a/v/M): 13/4/4  Pressures:  -- Right Ventricle (s/edp): 90/14/--    O2 Sats:  -- AO: 9.8/97/12.93  O2 Sats:  -- PA: 9.8/61.2/8.16  Outputs:  Baseline  Outputs:  -- OUTPUTS: CO by Xenia: 5.76  Outputs:  < end of copied text >    < from: Transthoracic Echocardiogram (08.17.16 @ 22:14) >  1. Normal left ventricular internal dimensions and wall  thicknesses.  2. Hyperdynamic left ventricle. Flattening of the  interventricular septum in both systole and diastole is  consistent with right ventricular pressure overload.  3. Decreased right ventricular systolic function.  4. Trace pericardial effusion.    < end of copied text > Interval Events:  Patient seen and examined at bedside this morning.   Tolerating Adempas w midodrine  Now agrees with Flolan      REVIEW OF SYSTEMS:  Constitutional: relative hypotension  CV:  + palpitations with ambulation  Resp:  stable  GI: no abd pain    [x ] All other systems negative  [ ] Unable to assess ROS because ________    OBJECTIVE:  ICU Vital Signs Last 24 Hrs  T(C): 36.7 (11 May 2018 11:46), Max: 36.7 (11 May 2018 11:46)  T(F): 98 (11 May 2018 11:46), Max: 98 (11 May 2018 11:46)  HR: 93 (11 May 2018 11:46) (88 - 93)  BP: 99/64 (11 May 2018 11:46) (91/62 - 99/64)  BP(mean): --  ABP: --  ABP(mean): --  RR: 18 (11 May 2018 11:46) (18 - 18)  SpO2: 96% (11 May 2018 11:46) (96% - 100%)        05-10 @ 07:01 - 05-11 @ 07:00  --------------------------------------------------------  IN: 1300 mL / OUT: 0 mL / NET: 1300 mL    05-11 @ 07:01 - 05-11 @ 17:55  --------------------------------------------------------  IN: 600 mL / OUT: 0 mL / NET: 600 mL      PHYSICAL EXAM:  General: Well appearing Female. No apparent distress  HEENT:   Mucous membranes are moist.  Neck: Supple. No JVD  Respiratory: clear  Cardiovascular: RRR, no m/r/g  Abdomen: Soft, non-tender, non-distended.   Extremities: No lower extremity edema.  Skin: Warm, dry, no rash.   Neurological: CNII-XII grossly intact.   Psychiatry: appropriate mood and affect.     HOSPITAL MEDICATIONS:  MEDICATIONS  (STANDING):  benzonatate 100 milliGRAM(s) Oral three times a day  carvedilol 6.25 milliGRAM(s) Oral every 12 hours  ferrous    sulfate 325 milliGRAM(s) Oral daily  heparin  Infusion.  Unit(s)/Hr (18 mL/Hr) IV Continuous <Continuous>  ipratropium    for Nebulization 500 MICROGram(s) Nebulizer every 6 hours  midodrine 10 milliGRAM(s) Oral three times a day  multivitamin 1 Tablet(s) Oral daily  pantoprazole    Tablet 40 milliGRAM(s) Oral before breakfast  Riociguat (Adempas) 2 milliGRAM(s) 2 milliGRAM(s) Oral three times a day  spironolactone 25 milliGRAM(s) Oral daily    MEDICATIONS  (PRN):  acetaminophen   Tablet. 650 milliGRAM(s) Oral every 6 hours PRN mild to moderate pain  guaiFENesin    Syrup 100 milliGRAM(s) Oral every 6 hours PRN Cough  heparin  Injectable 8000 Unit(s) IV Push every 6 hours PRN For aPTT less than 40  heparin  Injectable 4000 Unit(s) IV Push every 6 hours PRN For aPTT between 40 - 57      LABS:                        10.6   11.1  )-----------( 341      ( 10 May 2018 10:43 )             34.7     05-10    135  |  100  |  14  ----------------------------<  87  3.9   |  22  |  1.25    Ca    9.2      10 May 2018 10:43      PTT - ( 10 May 2018 18:09 )  PTT:44.1 sec        RADIOLOGY:  [ x ] Reviewed and interpreted by me    < from: Cardiac Cath Lab - Adult (05.04.18 @ 15:14) >  Pressures:  -- Pulmonary Artery (S/D/M): 88/31/56  Pressures:  -- Pulmonary Capillary Wedge: 10/10/6  Pressures:  -- Right Atrium (a/v/M): 13/4/4  Pressures:  -- Right Ventricle (s/edp): 90/14/--    O2 Sats:  -- AO: 9.8/97/12.93  O2 Sats:  -- PA: 9.8/61.2/8.16  Outputs:  Baseline  Outputs:  -- OUTPUTS: CO by Xenia: 5.76  Outputs:  < end of copied text >    < from: Transthoracic Echocardiogram (08.17.16 @ 22:14) >  1. Normal left ventricular internal dimensions and wall  thicknesses.  2. Hyperdynamic left ventricle. Flattening of the  interventricular septum in both systole and diastole is  consistent with right ventricular pressure overload.  3. Decreased right ventricular systolic function.  4. Trace pericardial effusion.    < end of copied text >

## 2018-05-11 NOTE — PROGRESS NOTE ADULT - ASSESSMENT
38 y/o female with PMHx of pulm htn w/ features of WHO Group I and IV.  Remains tachycardic with minimal exertion, which is related to her pHTN    -- c/w Adempas with Midodrine for BP support  -- c/w BB for HR control  -- may need IV agents for vasodilation - Dr Yoon to discuss with patient      will discuss with Dr Car Holman  Pulmonary Fellow  p [phone] 38 y/o female with PMHx of pulm htn w/ features of WHO Group I and IV. Agrees with trial of Flolan.    - c/w Adempas 2 tid  -- increase Midodrine to 10 tid  -- hold Xarelto, start hep gtt  -- discussed with ICU team - awaiting ICU bed for initiation of Flolan      Kira Holman  Pulmonary Fellow  p [phone]

## 2018-05-11 NOTE — PROGRESS NOTE ADULT - SUBJECTIVE AND OBJECTIVE BOX
Esme Person MD (PGY-1)  Pager: [phone]  7AM-7PM  For emergent questions between 7PM and 7AM an on weekends after 12PM, please page [phone].      Patient is a 37y old  Female who presents with a chief complaint of pulmonary artery HTN work up (07 May 2018 20:35)      SUBJECTIVE / OVERNIGHT EVENTS:  Patient still with HRs to 140s on minimal ambulation. Patient reporting headaches in the morning, thinks it might be related to Adempas, headache usually improved with Tylenol and sleep.     MEDICATIONS  (STANDING):  benzonatate 100 milliGRAM(s) Oral three times a day  carvedilol 6.25 milliGRAM(s) Oral every 12 hours  ferrous    sulfate 325 milliGRAM(s) Oral daily  ipratropium    for Nebulization 500 MICROGram(s) Nebulizer every 6 hours  midodrine 5 milliGRAM(s) Oral three times a day  multivitamin 1 Tablet(s) Oral daily  pantoprazole    Tablet 40 milliGRAM(s) Oral before breakfast  Riociguat (Adempas) 2 milliGRAM(s) 2 milliGRAM(s) Oral three times a day  rivaroxaban 20 milliGRAM(s) Oral every 24 hours  spironolactone 25 milliGRAM(s) Oral daily    MEDICATIONS  (PRN):  acetaminophen   Tablet. 650 milliGRAM(s) Oral every 6 hours PRN mild to moderate pain  guaiFENesin    Syrup 100 milliGRAM(s) Oral every 6 hours PRN Cough      Vital Signs:  Vital Signs Last 24 Hrs  T(C): 36.7 (11 May 2018 11:46), Max: 36.7 (11 May 2018 11:46)  T(F): 98 (11 May 2018 11:46), Max: 98 (11 May 2018 11:46)  HR: 93 (11 May 2018 11:46) (88 - 100)  BP: 99/64 (11 May 2018 11:46) (91/62 - 123/68)  BP(mean): --  RR: 18 (11 May 2018 11:46) (18 - 18)  SpO2: 96% (11 May 2018 11:46) (96% - 100%)  Daily     Daily   CAPILLARY BLOOD GLUCOSE        I&O's Summary    10 May 2018 07:01  -  11 May 2018 07:00  --------------------------------------------------------  IN: 1300 mL / OUT: 0 mL / NET: 1300 mL    11 May 2018 07:01  -  11 May 2018 13:39  --------------------------------------------------------  IN: 600 mL / OUT: 0 mL / NET: 600 mL        PHYSICAL EXAM  Constitutional: Young female, NAD, resting comfortably in bed  Eyes: conjunctiva and sclera clear  Neck: Supple, no LAD, no JVD  Respiratory: Normal work of breathing, CTA B/L; no W/R/R, no retractions  Cardiac: Tachycardic and regular rhythm, +S1/S2; no M/R/G  Gastrointestinal: soft, NT/ND; no rebound or guarding; +BSx4  Extremities: well perfused, no clubbing or cyanosis; no peripheral edema; 2+ peripheral pulses  Dermatologic: skin warm, dry and intact; no rashes, wounds, or scars  Neurologic: AAOx3; responding to questions appropriately, moving all extremities      LABS:                        10.6   11.1  )-----------( 341      ( 10 May 2018 10:43 )             34.7     05-10    135  |  100  |  14  ----------------------------<  87  3.9   |  22  |  1.25    Ca    9.2      10 May 2018 10:43      PTT - ( 10 May 2018 18:09 )  PTT:44.1 sec          RADIOLOGY & ADDITIONAL TESTS:

## 2018-05-12 LAB
ANION GAP SERPL CALC-SCNC: 13 MMOL/L — SIGNIFICANT CHANGE UP (ref 5–17)
APTT BLD: 97.1 SEC — HIGH (ref 27.5–37.4)
APTT BLD: > 200 SEC (ref 27.5–37.4)
BUN SERPL-MCNC: 11 MG/DL — SIGNIFICANT CHANGE UP (ref 7–23)
CALCIUM SERPL-MCNC: 9.5 MG/DL — SIGNIFICANT CHANGE UP (ref 8.4–10.5)
CHLORIDE SERPL-SCNC: 101 MMOL/L — SIGNIFICANT CHANGE UP (ref 96–108)
CO2 SERPL-SCNC: 22 MMOL/L — SIGNIFICANT CHANGE UP (ref 22–31)
CREAT SERPL-MCNC: 0.98 MG/DL — SIGNIFICANT CHANGE UP (ref 0.5–1.3)
GLUCOSE SERPL-MCNC: 85 MG/DL — SIGNIFICANT CHANGE UP (ref 70–99)
HCT VFR BLD CALC: 34.7 % — SIGNIFICANT CHANGE UP (ref 34.5–45)
HGB BLD-MCNC: 10.8 G/DL — LOW (ref 11.5–15.5)
MCHC RBC-ENTMCNC: 20.2 PG — LOW (ref 27–34)
MCHC RBC-ENTMCNC: 31.1 GM/DL — LOW (ref 32–36)
MCV RBC AUTO: 64.8 FL — LOW (ref 80–100)
PLATELET # BLD AUTO: 353 K/UL — SIGNIFICANT CHANGE UP (ref 150–400)
POTASSIUM SERPL-MCNC: 3.6 MMOL/L — SIGNIFICANT CHANGE UP (ref 3.5–5.3)
POTASSIUM SERPL-SCNC: 3.6 MMOL/L — SIGNIFICANT CHANGE UP (ref 3.5–5.3)
RBC # BLD: 5.36 M/UL — HIGH (ref 3.8–5.2)
RBC # FLD: 18.2 % — HIGH (ref 10.3–14.5)
SODIUM SERPL-SCNC: 136 MMOL/L — SIGNIFICANT CHANGE UP (ref 135–145)
WBC # BLD: 12.8 K/UL — HIGH (ref 3.8–10.5)
WBC # FLD AUTO: 12.8 K/UL — HIGH (ref 3.8–10.5)

## 2018-05-12 PROCEDURE — 99233 SBSQ HOSP IP/OBS HIGH 50: CPT | Mod: GC

## 2018-05-12 RX ORDER — FAMOTIDINE 10 MG/ML
40 INJECTION INTRAVENOUS ONCE
Qty: 0 | Refills: 0 | Status: COMPLETED | OUTPATIENT
Start: 2018-05-12 | End: 2018-05-12

## 2018-05-12 RX ADMIN — Medication 325 MILLIGRAM(S): at 11:10

## 2018-05-12 RX ADMIN — Medication 500 MICROGRAM(S): at 17:04

## 2018-05-12 RX ADMIN — MIDODRINE HYDROCHLORIDE 10 MILLIGRAM(S): 2.5 TABLET ORAL at 21:43

## 2018-05-12 RX ADMIN — HEPARIN SODIUM 0 UNIT(S)/HR: 5000 INJECTION INTRAVENOUS; SUBCUTANEOUS at 16:38

## 2018-05-12 RX ADMIN — Medication 500 MICROGRAM(S): at 06:14

## 2018-05-12 RX ADMIN — Medication 100 MILLIGRAM(S): at 13:54

## 2018-05-12 RX ADMIN — PANTOPRAZOLE SODIUM 40 MILLIGRAM(S): 20 TABLET, DELAYED RELEASE ORAL at 06:11

## 2018-05-12 RX ADMIN — Medication 100 MILLIGRAM(S): at 06:11

## 2018-05-12 RX ADMIN — SPIRONOLACTONE 25 MILLIGRAM(S): 25 TABLET, FILM COATED ORAL at 06:11

## 2018-05-12 RX ADMIN — MIDODRINE HYDROCHLORIDE 10 MILLIGRAM(S): 2.5 TABLET ORAL at 13:54

## 2018-05-12 RX ADMIN — HEPARIN SODIUM 1800 UNIT(S)/HR: 5000 INJECTION INTRAVENOUS; SUBCUTANEOUS at 00:16

## 2018-05-12 RX ADMIN — MIDODRINE HYDROCHLORIDE 10 MILLIGRAM(S): 2.5 TABLET ORAL at 06:13

## 2018-05-12 RX ADMIN — Medication 650 MILLIGRAM(S): at 00:30

## 2018-05-12 RX ADMIN — Medication 1 TABLET(S): at 11:10

## 2018-05-12 RX ADMIN — Medication 500 MICROGRAM(S): at 23:46

## 2018-05-12 RX ADMIN — HEPARIN SODIUM 1500 UNIT(S)/HR: 5000 INJECTION INTRAVENOUS; SUBCUTANEOUS at 17:46

## 2018-05-12 RX ADMIN — Medication 100 MILLIGRAM(S): at 21:44

## 2018-05-12 RX ADMIN — FAMOTIDINE 40 MILLIGRAM(S): 10 INJECTION INTRAVENOUS at 09:32

## 2018-05-12 RX ADMIN — HEPARIN SODIUM 1800 UNIT(S)/HR: 5000 INJECTION INTRAVENOUS; SUBCUTANEOUS at 08:57

## 2018-05-12 RX ADMIN — Medication 500 MICROGRAM(S): at 11:10

## 2018-05-12 NOTE — PROGRESS NOTE ADULT - ATTENDING COMMENTS
pt seen and examined     d/w the pt, family and the team  PAH Group 1 and 4.  Plan for Flolan titration in ICU ---awaiting bed in the MICU---  . Please hold xarelto and start heparin gtt, increase midodrine.

## 2018-05-12 NOTE — PROGRESS NOTE ADULT - ASSESSMENT
38 y/o female with PMHx of pulm htn w/ features of WHO Group I and IV. Agrees with trial of Flolan.    - c/w Adempas 2 tid  -- increase Midodrine to 10 tid  -- hold Xarelto, start hep gtt  -- discussed with ICU team - awaiting ICU bed for initiation of Flolan      Kira Holman  Pulmonary Fellow  p [phone]

## 2018-05-12 NOTE — PROGRESS NOTE ADULT - PROBLEM SELECTOR PLAN 1
Patient with sinus tachycardia up to 150s on ambulation likely secondary to RV pressure overload 2/2 pHTN and component of deconditioning  - TTE 05/09/2018 with RV enlargement and RV dysfunction, flattening of the septum c/w RV pressure overload  - Pending transfer to MICU and initiation of Flolan  - C/w coreg to 6.25mg BID (patient experienced hallucinations while on metoprolol tartrate)  - On discharge will need [facility] monitor and f/u with Dr. Dangelo Patient with sinus tachycardia up to 150s on ambulation likely secondary to RV pressure overload 2/2 pHTN and component of deconditioning  - TTE 05/09/2018 with RV enlargement and RV dysfunction, flattening of the septum c/w RV pressure overload  - Pending transfer to MICU and initiation of Flolan  - d/c coreg given low BP  -CTA without PE  - On discharge will need [facility] monitor and f/u with Dr. Dangelo

## 2018-05-12 NOTE — PROGRESS NOTE ADULT - SUBJECTIVE AND OBJECTIVE BOX
Interval Events:  Patient seen and examined at bedside this morning.   Tolerating Adempas w midodrine  Now agrees with Flolan      REVIEW OF SYSTEMS:  Constitutional: relative hypotension  CV:  + palpitations with ambulation  Resp:  stable  GI: no abd pain    [x ] All other systems negative  [ ] Unable to assess ROS because ________    OBJECTIVE:  ICU Vital Signs Last 24 Hrs  T(C): 36.7 (11 May 2018 11:46), Max: 36.7 (11 May 2018 11:46)  T(F): 98 (11 May 2018 11:46), Max: 98 (11 May 2018 11:46)  HR: 93 (11 May 2018 11:46) (88 - 93)  BP: 99/64 (11 May 2018 11:46) (91/62 - 99/64)  BP(mean): --  ABP: --  ABP(mean): --  RR: 18 (11 May 2018 11:46) (18 - 18)  SpO2: 96% (11 May 2018 11:46) (96% - 100%)        05-10 @ 07:01 - 05-11 @ 07:00  --------------------------------------------------------  IN: 1300 mL / OUT: 0 mL / NET: 1300 mL    05-11 @ 07:01 - 05-11 @ 17:55  --------------------------------------------------------  IN: 600 mL / OUT: 0 mL / NET: 600 mL      PHYSICAL EXAM:  General: Well appearing Female. No apparent distress  HEENT:   Mucous membranes are moist.  Neck: Supple. No JVD  Respiratory: clear  Cardiovascular: RRR, no m/r/g  Abdomen: Soft, non-tender, non-distended.   Extremities: No lower extremity edema.  Skin: Warm, dry, no rash.   Neurological: CNII-XII grossly intact.   Psychiatry: appropriate mood and affect.     HOSPITAL MEDICATIONS:  MEDICATIONS  (STANDING):  benzonatate 100 milliGRAM(s) Oral three times a day  carvedilol 6.25 milliGRAM(s) Oral every 12 hours  ferrous    sulfate 325 milliGRAM(s) Oral daily  heparin  Infusion.  Unit(s)/Hr (18 mL/Hr) IV Continuous <Continuous>  ipratropium    for Nebulization 500 MICROGram(s) Nebulizer every 6 hours  midodrine 10 milliGRAM(s) Oral three times a day  multivitamin 1 Tablet(s) Oral daily  pantoprazole    Tablet 40 milliGRAM(s) Oral before breakfast  Riociguat (Adempas) 2 milliGRAM(s) 2 milliGRAM(s) Oral three times a day  spironolactone 25 milliGRAM(s) Oral daily    MEDICATIONS  (PRN):  acetaminophen   Tablet. 650 milliGRAM(s) Oral every 6 hours PRN mild to moderate pain  guaiFENesin    Syrup 100 milliGRAM(s) Oral every 6 hours PRN Cough  heparin  Injectable 8000 Unit(s) IV Push every 6 hours PRN For aPTT less than 40  heparin  Injectable 4000 Unit(s) IV Push every 6 hours PRN For aPTT between 40 - 57      LABS:                        10.6   11.1  )-----------( 341      ( 10 May 2018 10:43 )             34.7     05-10    135  |  100  |  14  ----------------------------<  87  3.9   |  22  |  1.25    Ca    9.2      10 May 2018 10:43      PTT - ( 10 May 2018 18:09 )  PTT:44.1 sec        RADIOLOGY:  [ x ] Reviewed and interpreted by me    < from: Cardiac Cath Lab - Adult (05.04.18 @ 15:14) >  Pressures:  -- Pulmonary Artery (S/D/M): 88/31/56  Pressures:  -- Pulmonary Capillary Wedge: 10/10/6  Pressures:  -- Right Atrium (a/v/M): 13/4/4  Pressures:  -- Right Ventricle (s/edp): 90/14/--    O2 Sats:  -- AO: 9.8/97/12.93  O2 Sats:  -- PA: 9.8/61.2/8.16  Outputs:  Baseline  Outputs:  -- OUTPUTS: CO by Xenia: 5.76  Outputs:  < end of copied text >    < from: Transthoracic Echocardiogram (08.17.16 @ 22:14) >  1. Normal left ventricular internal dimensions and wall  thicknesses.  2. Hyperdynamic left ventricle. Flattening of the  interventricular septum in both systole and diastole is  consistent with right ventricular pressure overload.  3. Decreased right ventricular systolic function.  4. Trace pericardial effusion.    < end of copied text >

## 2018-05-12 NOTE — PROGRESS NOTE ADULT - ASSESSMENT
37 F w/ pulmonary HTN w/ Class I/IV features (dx in 2014), chronic asthma, chronic PE on Xarelto, GERD p/w dyspnea at rest now s/p RHC found to have improvement in mPAP and cardiac output restarted on home pHTN meds now found to have persistent hypotension with sinus tachycardia on exertion likely 2/2 RV pressure overload 2/2 pHTN. Pending transfer to MICU for initiation of Flolan.

## 2018-05-12 NOTE — PROGRESS NOTE ADULT - PROBLEM SELECTOR PROBLEM 6
GERD (gastroesophageal reflux disease)
Nutrition, metabolism, and development symptoms
GERD (gastroesophageal reflux disease)

## 2018-05-12 NOTE — PROGRESS NOTE ADULT - PROBLEM SELECTOR PLAN 6
chronic, also related to PO meds  - monitor; short course of PPI as needed
chronic, also related to PO meds  - monitor; short course of PPI as needed
Regular diet
chronic, also related to PO meds
chronic, also related to PO meds  - monitor; short course of PPI as needed

## 2018-05-12 NOTE — PROVIDER CONTACT NOTE (CRITICAL VALUE NOTIFICATION) - ASSESSMENT
Pt A&oX4. Pt. denies SOB, chest pain, difficulty breathing. Pt denies any headache. No s/s of bleeding

## 2018-05-12 NOTE — PROGRESS NOTE ADULT - ATTENDING COMMENTS
seen/examined  asymptomatic except for palpitations on exertion  sinus tachycardia due Pulmonary HTN  discussed with pulm Dr Yoon, BP borderline low, d/c coreg, avoid IVF as may decrease RV output, c/w midodrine 10mg tid  c/w heparin ggt given hx of PE, monitor PTT  Transfer to MICU for flolan titration when bed opens up

## 2018-05-12 NOTE — PROGRESS NOTE ADULT - PROBLEM SELECTOR PLAN 2
Pt was dx'd in 2014 with pulmonary HTN w/ features of Class I and Class IV as per a pulmonary angiogram done at [facility]. S/p RHC 5/4 showing improved mPAP (56mmHg) and CO as compared to 2014  - plan for transfer to MICU for initation of Flolan  - c/w adempas TID and spironolactone  - c/w midodrine 10mg TID for borderline hypotension on adempas  - Pulmonology following, recommendations appreciated

## 2018-05-12 NOTE — PROGRESS NOTE ADULT - SUBJECTIVE AND OBJECTIVE BOX
Esme Person MD (PGY-1)  Pager: [phone]  7AM-7PM  For emergent questions between 7PM and 7AM an on weekends after 12PM, please page [phone].      Patient is a 37y old  Female who presents with a chief complaint of pulmonary artery HTN work up (07 May 2018 20:35)      SUBJECTIVE / OVERNIGHT EVENTS:  Patient transitioned from xarelto to heparin gtt in prep for central line placement and flolan initiation. Patient without complaints this AM. HR up to 140s on ambulation. No SOB or dizziness.    MEDICATIONS  (STANDING):  benzonatate 100 milliGRAM(s) Oral three times a day  carvedilol 6.25 milliGRAM(s) Oral every 12 hours  ferrous    sulfate 325 milliGRAM(s) Oral daily  heparin  Infusion.  Unit(s)/Hr (18 mL/Hr) IV Continuous <Continuous>  ipratropium    for Nebulization 500 MICROGram(s) Nebulizer every 6 hours  midodrine 10 milliGRAM(s) Oral three times a day  multivitamin 1 Tablet(s) Oral daily  pantoprazole    Tablet 40 milliGRAM(s) Oral before breakfast  Riociguat (Adempas) 2 milliGRAM(s) 2 milliGRAM(s) Oral three times a day  spironolactone 25 milliGRAM(s) Oral daily    MEDICATIONS  (PRN):  acetaminophen   Tablet. 650 milliGRAM(s) Oral every 6 hours PRN mild to moderate pain  guaiFENesin    Syrup 100 milliGRAM(s) Oral every 6 hours PRN Cough  heparin  Injectable 8000 Unit(s) IV Push every 6 hours PRN For aPTT less than 40  heparin  Injectable 4000 Unit(s) IV Push every 6 hours PRN For aPTT between 40 - 57      Vital Signs:  Vital Signs Last 24 Hrs  T(C): 36.9 (12 May 2018 04:00), Max: 36.9 (12 May 2018 04:00)  T(F): 98.5 (12 May 2018 04:00), Max: 98.5 (12 May 2018 04:00)  HR: 99 (12 May 2018 06:07) (69 - 99)  BP: 96/64 (12 May 2018 06:07) (96/64 - 110/73)  BP(mean): 79 (11 May 2018 20:09) (79 - 79)  RR: 18 (12 May 2018 04:00) (18 - 18)  SpO2: 97% (12 May 2018 04:00) (96% - 98%)  Daily     Daily   CAPILLARY BLOOD GLUCOSE        I&O's Summary    11 May 2018 07:01  -  12 May 2018 07:00  --------------------------------------------------------  IN: 1374 mL / OUT: 0 mL / NET: 1374 mL        PHYSICAL EXAM  Constitutional: Young female, NAD, resting comfortably in bed  Eyes: conjunctiva and sclera clear  Respiratory: Normal work of breathing, CTA B/L; no W/R/R  Cardiac: Regular rate and rhythm, +S1/S2; no M/R/G  Gastrointestinal: soft, NT/ND; no rebound or guarding; +BSx4  Extremities: well perfused, no clubbing or cyanosis; no peripheral edema; 2+ peripheral pulses  Dermatologic: skin warm, dry and intact; no rashes, wounds, or scars  Neurologic: AAOx3; responding to questions appropriately, moving all extremities    LABS:                        10.8   12.8  )-----------( 353      ( 12 May 2018 07:43 )             34.7     05-12    136  |  101  |  11  ----------------------------<  85  3.6   |  22  |  0.98    Ca    9.5      12 May 2018 07:01      PTT - ( 12 May 2018 07:43 )  PTT:97.1 sec          RADIOLOGY & ADDITIONAL TESTS:

## 2018-05-13 LAB
ANION GAP SERPL CALC-SCNC: 15 MMOL/L — SIGNIFICANT CHANGE UP (ref 5–17)
APTT BLD: 175.5 SEC — CRITICAL HIGH (ref 27.5–37.4)
APTT BLD: 68 SEC — HIGH (ref 27.5–37.4)
APTT BLD: 92.9 SEC — HIGH (ref 27.5–37.4)
APTT BLD: >200 SEC — CRITICAL HIGH (ref 27.5–37.4)
BASE EXCESS BLDV CALC-SCNC: -0.5 MMOL/L — SIGNIFICANT CHANGE UP (ref -2–2)
BASOPHILS # BLD AUTO: 0 K/UL — SIGNIFICANT CHANGE UP (ref 0–0.2)
BASOPHILS NFR BLD AUTO: 0 % — SIGNIFICANT CHANGE UP (ref 0–2)
BUN SERPL-MCNC: 10 MG/DL — SIGNIFICANT CHANGE UP (ref 7–23)
CA-I SERPL-SCNC: 1.15 MMOL/L — SIGNIFICANT CHANGE UP (ref 1.12–1.3)
CALCIUM SERPL-MCNC: 9.5 MG/DL — SIGNIFICANT CHANGE UP (ref 8.4–10.5)
CHLORIDE BLDV-SCNC: 107 MMOL/L — SIGNIFICANT CHANGE UP (ref 96–108)
CHLORIDE SERPL-SCNC: 100 MMOL/L — SIGNIFICANT CHANGE UP (ref 96–108)
CO2 BLDV-SCNC: 24 MMOL/L — SIGNIFICANT CHANGE UP (ref 22–30)
CO2 SERPL-SCNC: 22 MMOL/L — SIGNIFICANT CHANGE UP (ref 22–31)
CREAT SERPL-MCNC: 1.05 MG/DL — SIGNIFICANT CHANGE UP (ref 0.5–1.3)
EOSINOPHIL # BLD AUTO: 0.23 K/UL — SIGNIFICANT CHANGE UP (ref 0–0.5)
EOSINOPHIL NFR BLD AUTO: 2 % — SIGNIFICANT CHANGE UP (ref 0–6)
GAS PNL BLDV: 134 MMOL/L — LOW (ref 136–145)
GAS PNL BLDV: SIGNIFICANT CHANGE UP
GAS PNL BLDV: SIGNIFICANT CHANGE UP
GLUCOSE BLDV-MCNC: 103 MG/DL — HIGH (ref 70–99)
GLUCOSE SERPL-MCNC: 86 MG/DL — SIGNIFICANT CHANGE UP (ref 70–99)
HCO3 BLDV-SCNC: 22 MMOL/L — SIGNIFICANT CHANGE UP (ref 21–29)
HCT VFR BLD CALC: 34.4 % — LOW (ref 34.5–45)
HCT VFR BLD CALC: 34.8 % — SIGNIFICANT CHANGE UP (ref 34.5–45)
HCT VFR BLDA CALC: 32 % — LOW (ref 39–50)
HGB BLD CALC-MCNC: 10.2 G/DL — LOW (ref 11.5–15.5)
HGB BLD-MCNC: 10.6 G/DL — LOW (ref 11.5–15.5)
HGB BLD-MCNC: 10.7 G/DL — LOW (ref 11.5–15.5)
INR BLD: 1.18 RATIO — HIGH (ref 0.88–1.16)
LACTATE BLDV-MCNC: 0.8 MMOL/L — SIGNIFICANT CHANGE UP (ref 0.7–2)
LYMPHOCYTES # BLD AUTO: 2.98 K/UL — SIGNIFICANT CHANGE UP (ref 1–3.3)
LYMPHOCYTES # BLD AUTO: 26 % — SIGNIFICANT CHANGE UP (ref 13–44)
MAGNESIUM SERPL-MCNC: 2.2 MG/DL — SIGNIFICANT CHANGE UP (ref 1.6–2.6)
MCHC RBC-ENTMCNC: 19.4 PG — LOW (ref 27–34)
MCHC RBC-ENTMCNC: 19.7 PG — LOW (ref 27–34)
MCHC RBC-ENTMCNC: 30.5 GM/DL — LOW (ref 32–36)
MCHC RBC-ENTMCNC: 31.1 GM/DL — LOW (ref 32–36)
MCV RBC AUTO: 62.3 FL — LOW (ref 80–100)
MCV RBC AUTO: 64.6 FL — LOW (ref 80–100)
MONOCYTES # BLD AUTO: 0.57 K/UL — SIGNIFICANT CHANGE UP (ref 0–0.9)
MONOCYTES NFR BLD AUTO: 5 % — SIGNIFICANT CHANGE UP (ref 2–14)
NEUTROPHILS # BLD AUTO: 7.56 K/UL — HIGH (ref 1.8–7.4)
NEUTROPHILS NFR BLD AUTO: 66 % — SIGNIFICANT CHANGE UP (ref 43–77)
OTHER CELLS CSF MANUAL: 10 ML/DL — LOW (ref 18–22)
PCO2 BLDV: 32 MMHG — LOW (ref 35–50)
PH BLDV: 7.46 — HIGH (ref 7.35–7.45)
PHOSPHATE SERPL-MCNC: 3.3 MG/DL — SIGNIFICANT CHANGE UP (ref 2.5–4.5)
PLATELET # BLD AUTO: 323 K/UL — SIGNIFICANT CHANGE UP (ref 150–400)
PLATELET # BLD AUTO: 346 K/UL — SIGNIFICANT CHANGE UP (ref 150–400)
PO2 BLDV: 39 MMHG — SIGNIFICANT CHANGE UP (ref 25–45)
POTASSIUM BLDV-SCNC: 3.5 MMOL/L — SIGNIFICANT CHANGE UP (ref 3.5–5)
POTASSIUM SERPL-MCNC: 3.4 MMOL/L — LOW (ref 3.5–5.3)
POTASSIUM SERPL-SCNC: 3.4 MMOL/L — LOW (ref 3.5–5.3)
PROTHROM AB SERPL-ACNC: 12.8 SEC — HIGH (ref 9.8–12.7)
RBC # BLD: 5.39 M/UL — HIGH (ref 3.8–5.2)
RBC # BLD: 5.52 M/UL — HIGH (ref 3.8–5.2)
RBC # FLD: 18.6 % — HIGH (ref 10.3–14.5)
RBC # FLD: 19.5 % — HIGH (ref 10.3–14.5)
SAO2 % BLDV: 68 % — SIGNIFICANT CHANGE UP (ref 67–88)
SODIUM SERPL-SCNC: 137 MMOL/L — SIGNIFICANT CHANGE UP (ref 135–145)
WBC # BLD: 11.46 K/UL — HIGH (ref 3.8–10.5)
WBC # BLD: 9.3 K/UL — SIGNIFICANT CHANGE UP (ref 3.8–10.5)
WBC # FLD AUTO: 11.46 K/UL — HIGH (ref 3.8–10.5)
WBC # FLD AUTO: 9.3 K/UL — SIGNIFICANT CHANGE UP (ref 3.8–10.5)

## 2018-05-13 PROCEDURE — 36556 INSERT NON-TUNNEL CV CATH: CPT

## 2018-05-13 PROCEDURE — 71045 X-RAY EXAM CHEST 1 VIEW: CPT | Mod: 26

## 2018-05-13 RX ORDER — METOPROLOL TARTRATE 50 MG
5 TABLET ORAL ONCE
Qty: 0 | Refills: 0 | Status: DISCONTINUED | OUTPATIENT
Start: 2018-05-13 | End: 2018-05-13

## 2018-05-13 RX ORDER — HEPARIN SODIUM 5000 [USP'U]/ML
1200 INJECTION INTRAVENOUS; SUBCUTANEOUS
Qty: 25000 | Refills: 0 | Status: DISCONTINUED | OUTPATIENT
Start: 2018-05-13 | End: 2018-05-17

## 2018-05-13 RX ORDER — PANTOPRAZOLE SODIUM 20 MG/1
40 TABLET, DELAYED RELEASE ORAL
Qty: 0 | Refills: 0 | Status: DISCONTINUED | OUTPATIENT
Start: 2018-05-13 | End: 2018-05-22

## 2018-05-13 RX ORDER — NOREPINEPHRINE BITARTRATE/D5W 8 MG/250ML
0.05 PLASTIC BAG, INJECTION (ML) INTRAVENOUS
Qty: 8 | Refills: 0 | Status: DISCONTINUED | OUTPATIENT
Start: 2018-05-13 | End: 2018-05-20

## 2018-05-13 RX ORDER — POTASSIUM CHLORIDE 20 MEQ
40 PACKET (EA) ORAL ONCE
Qty: 0 | Refills: 0 | Status: COMPLETED | OUTPATIENT
Start: 2018-05-13 | End: 2018-05-13

## 2018-05-13 RX ORDER — FERROUS SULFATE 325(65) MG
325 TABLET ORAL DAILY
Qty: 0 | Refills: 0 | Status: DISCONTINUED | OUTPATIENT
Start: 2018-05-13 | End: 2018-05-22

## 2018-05-13 RX ORDER — SPIRONOLACTONE 25 MG/1
25 TABLET, FILM COATED ORAL DAILY
Qty: 0 | Refills: 0 | Status: DISCONTINUED | OUTPATIENT
Start: 2018-05-13 | End: 2018-05-17

## 2018-05-13 RX ORDER — EPOPROSTENOL 0.5 MG/10ML
1 INJECTION, POWDER, LYOPHILIZED, FOR SOLUTION INTRAVENOUS
Qty: 500 | Refills: 0 | Status: DISCONTINUED | OUTPATIENT
Start: 2018-05-13 | End: 2018-05-13

## 2018-05-13 RX ORDER — MIDODRINE HYDROCHLORIDE 2.5 MG/1
10 TABLET ORAL THREE TIMES A DAY
Qty: 0 | Refills: 0 | Status: DISCONTINUED | OUTPATIENT
Start: 2018-05-13 | End: 2018-05-14

## 2018-05-13 RX ORDER — EPOPROSTENOL 0.5 MG/10ML
2 INJECTION, POWDER, LYOPHILIZED, FOR SOLUTION INTRAVENOUS
Qty: 500 | Refills: 0 | Status: DISCONTINUED | OUTPATIENT
Start: 2018-05-13 | End: 2018-05-14

## 2018-05-13 RX ORDER — IPRATROPIUM BROMIDE 0.2 MG/ML
500 SOLUTION, NON-ORAL INHALATION EVERY 6 HOURS
Qty: 0 | Refills: 0 | Status: DISCONTINUED | OUTPATIENT
Start: 2018-05-13 | End: 2018-05-14

## 2018-05-13 RX ORDER — ACETAMINOPHEN 500 MG
650 TABLET ORAL EVERY 6 HOURS
Qty: 0 | Refills: 0 | Status: DISCONTINUED | OUTPATIENT
Start: 2018-05-13 | End: 2018-05-18

## 2018-05-13 RX ORDER — DOBUTAMINE HCL 250MG/20ML
2.5 VIAL (ML) INTRAVENOUS
Qty: 500 | Refills: 0 | Status: DISCONTINUED | OUTPATIENT
Start: 2018-05-13 | End: 2018-05-14

## 2018-05-13 RX ORDER — FENTANYL CITRATE 50 UG/ML
50 INJECTION INTRAVENOUS ONCE
Qty: 0 | Refills: 0 | Status: DISCONTINUED | OUTPATIENT
Start: 2018-05-13 | End: 2018-05-13

## 2018-05-13 RX ADMIN — MIDODRINE HYDROCHLORIDE 10 MILLIGRAM(S): 2.5 TABLET ORAL at 06:47

## 2018-05-13 RX ADMIN — HEPARIN SODIUM 0 UNIT(S)/HR: 5000 INJECTION INTRAVENOUS; SUBCUTANEOUS at 00:48

## 2018-05-13 RX ADMIN — Medication 100 MILLIGRAM(S): at 21:20

## 2018-05-13 RX ADMIN — Medication 40 MILLIEQUIVALENT(S): at 11:55

## 2018-05-13 RX ADMIN — Medication 500 MICROGRAM(S): at 19:38

## 2018-05-13 RX ADMIN — EPOPROSTENOL 1.16 NANOGRAM(S)/KG/MIN: 0.5 INJECTION, POWDER, LYOPHILIZED, FOR SOLUTION INTRAVENOUS at 18:18

## 2018-05-13 RX ADMIN — MIDODRINE HYDROCHLORIDE 10 MILLIGRAM(S): 2.5 TABLET ORAL at 21:20

## 2018-05-13 RX ADMIN — Medication 325 MILLIGRAM(S): at 11:56

## 2018-05-13 RX ADMIN — PANTOPRAZOLE SODIUM 40 MILLIGRAM(S): 20 TABLET, DELAYED RELEASE ORAL at 11:55

## 2018-05-13 RX ADMIN — SPIRONOLACTONE 25 MILLIGRAM(S): 25 TABLET, FILM COATED ORAL at 06:47

## 2018-05-13 RX ADMIN — EPOPROSTENOL 2.33 NANOGRAM(S)/KG/MIN: 0.5 INJECTION, POWDER, LYOPHILIZED, FOR SOLUTION INTRAVENOUS at 18:26

## 2018-05-13 RX ADMIN — Medication 100 MILLIGRAM(S): at 06:47

## 2018-05-13 RX ADMIN — PANTOPRAZOLE SODIUM 40 MILLIGRAM(S): 20 TABLET, DELAYED RELEASE ORAL at 06:47

## 2018-05-13 RX ADMIN — Medication 1 TABLET(S): at 11:55

## 2018-05-13 RX ADMIN — Medication 500 MICROGRAM(S): at 06:47

## 2018-05-13 RX ADMIN — MIDODRINE HYDROCHLORIDE 10 MILLIGRAM(S): 2.5 TABLET ORAL at 15:03

## 2018-05-13 RX ADMIN — Medication 650 MILLIGRAM(S): at 16:09

## 2018-05-13 RX ADMIN — FENTANYL CITRATE 50 MICROGRAM(S): 50 INJECTION INTRAVENOUS at 13:25

## 2018-05-13 RX ADMIN — SPIRONOLACTONE 25 MILLIGRAM(S): 25 TABLET, FILM COATED ORAL at 11:56

## 2018-05-13 RX ADMIN — HEPARIN SODIUM 1200 UNIT(S)/HR: 5000 INJECTION INTRAVENOUS; SUBCUTANEOUS at 01:50

## 2018-05-13 RX ADMIN — Medication 650 MILLIGRAM(S): at 15:09

## 2018-05-13 RX ADMIN — Medication 100 MILLIGRAM(S): at 15:03

## 2018-05-13 RX ADMIN — EPOPROSTENOL 2.33 NANOGRAM(S)/KG/MIN: 0.5 INJECTION, POWDER, LYOPHILIZED, FOR SOLUTION INTRAVENOUS at 23:16

## 2018-05-13 RX ADMIN — Medication 500 MICROGRAM(S): at 12:30

## 2018-05-13 NOTE — PROGRESS NOTE ADULT - ATTENDING COMMENTS
38 yo F with a hx pHTN- initially diagnosed in 2014, chronic PE has been treated with coumadin and most recently on Xarelto asthma, Iron Deficiency Anemia, and GERD. 38 yo F with a hx pHTN (Group1/4 and +vasoreactivity)- initially diagnosed in 2014 on a/c, asthma transferred to ICU for flolan initiation. Pt w/ episodes of syncope and SOUZA. Currently at rest is comfortable. Plan for TLC placement followed by low dose flolan initiation. BP  at baseline is  currently. May need concurrent dobutamine for RV support. Cont riociguat. a/c held for tlc placement.

## 2018-05-13 NOTE — PROGRESS NOTE ADULT - ASSESSMENT
37yoF admitted to the MICU to institute Flolan therapy. The pt has a PMHX of Pulmonary HTN, , Chronic PE on Xarelto at home, asthma, GERD, Iron Deficiency Anemia who was admitted to NS for further workup of her Pulmonary HTN as per her worsening symptoms of late; SOUZA and a syncopal episode w a +LOC 4/16/18.    Neuro: Pt w/o any neurological deficit's  -c/w neuro checks as per protocol    CV: Hx Pulm Htn  -c/w midodrine- maintain a MAP > / = 65   -c/w nifedipine- pt with baseline tachycardia which become more markedly pronounced w exertion   -C/w aldactone    Pulm: Asthma / Pulm Htn/ PE (hx)  -c/w nocturnal and PRN O2 supplementation  -c/w Atrovent nebs atc  -c/w Adempas   -aspiration precautions / chest PT/ OOB to chair as pt condition permits  -Heparin gtt on hold now for supra-therapeutic levels and for line placement- will restart s/p line placement - and PTT within safe range to restart heparin gtt- restart at patient specific protocol  -f/u Dr. Yoon    GI; hx GERD  -c/w PPI  -c/w regular po diet as tolerated    Renal: No renal issues  -pt voiding urine freely

## 2018-05-13 NOTE — PROGRESS NOTE ADULT - SUBJECTIVE AND OBJECTIVE BOX
CHIEF COMPLAINT: Admitted to MICU to [facility]    HPI: This is a 37yoF with a PMHX of Pulmonary HTN- initially diagnosed in 2014, chronic PE has been treated with coumadin and most recently on Xarelto asthma, Iron Deficiency Anemia, and GERD. The pt had a syncopal episode 4/16 w a LOC had been admitted to [facility] ([place]) until 4/18, was becoming progressively SOB / SOUZA, followed up with Dr. Yoon, was admitted to NS 5/1, is s/p Right Cardiac Cath, now transferred to MICU 5/13 to institute [facility].     PAST MEDICAL & SURGICAL HISTORY:  PE (pulmonary thromboembolism): on coumadin  Asthma with status asthmaticus, unspecified asthma severity  Sinus tachycardia  Asthma  Pulmonary embolism  Pulmonary hypertension  Anemia: On home O2 at 2L via NC  Tachycardia  Pulmonary hypertension  No significant past surgical history  History of tubal ligation      FAMILY HISTORY:  No pertinent family history in first degree relatives  Family history of diabetes mellitus in mother  Family history of diabetes mellitus      SOCIAL HISTORY:  Smoking: [ ] Never Smoked [ ] Former Smoker (__ packs x ___ years) [ ] Current Smoker  (__ packs x ___ years)  Substance Use: [ ] Never Used [ ] Used ____  EtOH Use:  Marital Status: [ ] Single [ ]  [ ]  [ ]   Sexual History:   Occupation:  Recent Travel:  Country of Birth:  Advance Directives:    Allergies    No Known Allergies    Intolerances        HOME MEDICATIONS:    REVIEW OF SYSTEMS:  Constitutional: [ ] fevers [ ] chills [ ] weight loss [ ] weight gain  HEENT: [ ] dry eyes [ ] eye irritation [ ] postnasal drip [ ] nasal congestion  CV: [ ] chest pain [ ] orthopnea [ ] palpitations [ ] murmur  Resp: [ ] cough [ ] shortness of breath [ ] dyspnea [ ] wheezing [ ] sputum [ ] hemoptysis  GI: [ ] nausea [ ] vomiting [ ] diarrhea [ ] constipation [ ] abd pain [ ] dysphagia   : [ ] dysuria [ ] nocturia [ ] hematuria [ ] increased urinary frequency  Musculoskeletal: [ ] back pain [ ] myalgias [ ] arthralgias [ ] fracture  Skin: [ ] rash [ ] itch  Neurological: [ ] headache [ ] dizziness [ ] syncope [ ] weakness [ ] numbness  Psychiatric: [ ] anxiety [ ] depression  Endocrine: [ ] diabetes [ ] thyroid problem  Hematologic/Lymphatic: [ ] anemia [ ] bleeding problem  Allergic/Immunologic: [ ] itchy eyes [ ] nasal discharge [ ] hives [ ] angioedema  [ ] All other systems negative  [ ] Unable to assess ROS because ________    OBJECTIVE:  ICU Vital Signs Last 24 Hrs  T(C): 37.1 (13 May 2018 08:34), Max: 37.1 (13 May 2018 04:26)  T(F): 98.7 (13 May 2018 08:34), Max: 98.7 (13 May 2018 04:26)  HR: 104 (13 May 2018 08:34) (88 - 104)  BP: 124/79 (13 May 2018 08:34) (90/59 - 124/79)  BP(mean): 72 (12 May 2018 20:20) (72 - 73)  ABP: --  ABP(mean): --  RR: 18 (13 May 2018 08:34) (18 - 18)  SpO2: 98% (13 May 2018 08:34) (98% - 100%)        05-12 @ 07:01  -  05-13 @ 07:00  --------------------------------------------------------  IN: 1641 mL / OUT: 500 mL / NET: 1141 mL      CAPILLARY BLOOD GLUCOSE          PHYSICAL EXAM:  General:   HEENT:   Lymph Nodes:  Neck:   Respiratory:   Cardiovascular:   Abdomen:   Extremities:   Skin:   Neurological:  Psychiatry:    LINES:     HOSPITAL MEDICATIONS:  MEDICATIONS  (STANDING):  benzonatate 100 milliGRAM(s) Oral three times a day  ferrous    sulfate 325 milliGRAM(s) Oral daily  ipratropium    for Nebulization 500 MICROGram(s) Nebulizer every 6 hours  midodrine 10 milliGRAM(s) Oral three times a day  multivitamin 1 Tablet(s) Oral daily  pantoprazole    Tablet 40 milliGRAM(s) Oral before breakfast  Riociguat (Adempas) 2 milliGRAM(s) 2 milliGRAM(s) Oral three times a day  spironolactone 25 milliGRAM(s) Oral daily    MEDICATIONS  (PRN):  acetaminophen   Tablet. 650 milliGRAM(s) Oral every 6 hours PRN mild to moderate pain  guaiFENesin    Syrup 100 milliGRAM(s) Oral every 6 hours PRN Cough      LABS:                        10.8   12.8  )-----------( 353      ( 12 May 2018 07:43 )             34.7     Hgb Trend: 10.8<--, 10.6<--, 10.5<--  05-13    137  |  100  |  10  ----------------------------<  86  3.4<L>   |  22  |  1.05    Ca    9.5      13 May 2018 08:46      Creatinine Trend: 1.05<--, 0.98<--, 1.25<--, 1.02<--, 1.07<--, 1.08<--  PTT - ( 13 May 2018 08:46 )  PTT:175.5 sec          MICROBIOLOGY:     RADIOLOGY:  [ ] Reviewed and interpreted by me    EKG: CHIEF COMPLAINT: Admitted to MICU to [facility]    HPI: This is a 37yoF with a PMHX of Pulmonary HTN- initially diagnosed in , chronic PE has been treated with coumadin and most recently on Xarelto asthma, Iron Deficiency Anemia, and GERD. The pt had a syncopal episode  w a LOC had been admitted to [facility] ([place]) until , was becoming progressively SOB / SOUZA, followed up with Dr. Yoon, was admitted to NS , is s/p Right Cardiac Cath, now transferred to MICU  to institute flolan.     PAST MEDICAL & SURGICAL HISTORY:  PE (pulmonary thromboembolism): on coumadin  Asthma with status asthmaticus, unspecified asthma severity  Sinus tachycardia  Asthma  Pulmonary embolism  Pulmonary hypertension  Anemia: On home O2 at 2L via NC  Tachycardia  Pulmonary hypertension  No significant past surgical history  History of tubal ligation      FAMILY HISTORY:  No pertinent family history in first degree relatives  Family history of diabetes mellitus in mother  Family history of diabetes mellitus      SOCIAL HISTORY:  Smoking: [x ] Never Smoked [ ] Former Smoker (__ packs x ___ years) [ ] Current Smoker  (__ packs x ___ years)  Substance Use: [x ] Never Used [ ] Used ____  EtOH Use:  Marital Status: [ ] Single [ ]  [ ]  [ ]   Sexual History:   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  Recent Travel: none  Country of Birth:  Advance Directives:    Allergies:   No Known Allergies    Intolerances      HOME MEDICATIONS:      OBJECTIVE:  ICU Vital Signs Last 24 Hrs  T(C): 37.1 (13 May 2018 08:34), Max: 37.1 (13 May 2018 04:26)  T(F): 98.7 (13 May 2018 08:34), Max: 98.7 (13 May 2018 04:26)  HR: 104 (13 May 2018 08:34) (88 - 104)  BP: 124/79 (13 May 2018 08:34) (90/59 - 124/79)  BP(mean): 72 (12 May 2018 20:20) (72 - 73)  ABP: --  ABP(mean): --  RR: 18 (13 May 2018 08:34) (18 - 18)  SpO2: 98% (13 May 2018 08:34) (98% - 100%)      -12 @ 07:01  -  05- @ 07:00  --------------------------------------------------------  IN: 1641 mL / OUT: 500 mL / NET: 1141 mL    CAPILLARY BLOOD GLUCOSE    LINES:  [facility] MEDICATIONS:  MEDICATIONS  (STANDING):  benzonatate 100 milliGRAM(s) Oral three times a day  ferrous    sulfate 325 milliGRAM(s) Oral daily  ipratropium    for Nebulization 500 MICROGram(s) Nebulizer every 6 hours  midodrine 10 milliGRAM(s) Oral three times a day  multivitamin 1 Tablet(s) Oral daily  pantoprazole    Tablet 40 milliGRAM(s) Oral before breakfast  Riociguat (Adempas) 2 milliGRAM(s) 2 milliGRAM(s) Oral three times a day  spironolactone 25 milliGRAM(s) Oral daily    MEDICATIONS  (PRN):  acetaminophen   Tablet. 650 milliGRAM(s) Oral every 6 hours PRN mild to moderate pain  guaiFENesin    Syrup 100 milliGRAM(s) Oral every 6 hours PRN Cough      LABS:                        10.8   12.8  )-----------( 353      ( 12 May 2018 07:43 )             34.7     Hgb Trend: 10.8<--, 10.6<--, 10.5<--  05-13    137  |  100  |  10  ----------------------------<  86  3.4<L>   |  22  |  1.05    Ca    9.5      13 May 2018 08:46      Creatinine Trend: 1.05<--, 0.98<--, 1.25<--, 1.02<--, 1.07<--, 1.08<--  PTT - ( 13 May 2018 08:46 )  PTT:175.5 sec      MICROBIOLOGY:   Manual Differential (18 @ 08:01)    Microcytosis: Moderate    RADIOLOGY:  < from: CT Angio Chest w/ IV Cont (18 @ 21:09) >  EXAM:  CT ANGIO CHEST (W)AW IC                        PROCEDURE DATE:  2018    INTERPRETATION:  CLINICAL INFORMATION: Tachycardia and shortness of   breath; evaluate for pulmonary embolism. History of chronic pulmonary   embolism and pulmonary hypertension.    TECHNIQUE: CT pulmonary angiogram was obtained with intravenous contrast.   90 cc of Omnipaque 350 were administered intravenously without   complication and 10 cc were discarded. Coronal and Sagittal   reconstructions were performed.  Maximum Intensity Projection was   generated.    COMPARISON: CT chest from 2016. CT chest from 2015    FINDINGS:    PULMONARY ARTERIES: There is good opacification of pulmonary arterial   tree.  No filling defects are identified within the main, lobar,   segmental and subsegmental pulmonary arteries.  The main pulmonary artery   is enlarged, measuring 4.0 cm.    HEART AND VESSELS: The right atrium and right ventricle are enlarged.   There is no pericardial effusion.  There is thickening of the right   ventricular free wall    AIRWAYS, LUNGS AND PLEURA: Patent central airways.  Interval resolution   of previously seen bilateral airspace opacities. No significant interval   change in bilateral scattered groundglass opacities with a mosaic   pattern, compared to imaging from 2015.    There is no pleural effusion or pneumothorax.    MEDIASTINUM: No significant mediastinal or hilar adenopathy is seen.     NECK AND CHEST WALL: The visualized thyroidis homogeneous.  There is no   significant supraclavicular or axillary lymphadenopathy.    BONES: The visualized osseous structures are unremarkable.    UPPER ABDOMEN: There is a small radiopaque density within the stomach,   likely a pill.    IMPRESSION:  No evidence of pulmonary embolism.    Pulmonary hypertension with enlarged main pulmonary artery, right atrium   and ventricle. There is thickening of the right ventricular free wall    Interval resolution of previously seen airspace opacities,with residual   scattered bilateral groundglass opacities in a mosaic pattern, likely   secondary to pulmonary hypertension.      ECHO:  < from: Transthoracic Echocardiogram (18 @ 16:06) >  Patient name: ROXI MARIA  YOB: 1980   Age: 37 (F)   MR#: [id number]  Study Date: 2018  Location: [address] [place], [place] [id number]: Lucero Anderson RDCS  Study quality: Technically fair  Referring Physician: Girish Pierre MD  Blood Pressure: 98/52 mmHg  Height: 168 cm  Weight: 97 kg  BSA: 2.1 m2  ------------------------------------------------------------------------  PROCEDURE: Transthoracic echocardiogram with 2-D, M-Mode  and complete spectral and color flow Doppler.  INDICATION: Dyspnea, unspecified (R06.00)  ------------------------------------------------------------------------  Dimensions:    Normal Values:  LA:     2.8    2.0 - 4.0 cm  Ao:     3.3    2.0 - 3.8 cm  SEPTUM: 0.9    0.6 - 1.2 cm  PWT:    0.8    0.6 - 1.1 cm  LVIDd:  3.4    3.0 - 5.6 cm  LVIDs:  1.6    1.8 - 4.0 cm  Derived variables:  LVMI: 38 g/m2  RWT: 0.47  EF (Visual Estimate): 70 %  Doppler Peak Velocity (m/sec): AoV=1.0 PV=1.3  ------------------------------------------------------------------------  Observations:  Mitral Valve: Normal mitral valve structure. Mild mitral  regurgitation.  Aortic Valve/Aorta: Tricuspid aortic valve with normal cusp  excursion. Peak transaortic valve gradient equals 4 mm Hg.  No aortic valve regurgitation seen.Peak left ventricular  outflow tract gradient equals 3 mm Hg.  Aortic Root (SoV): 3.3 cm.  Left Atrium: Normal left atrium.  Left Ventricle: Not all wall segments were well visualized.   Normal overall left ventricular systolic function with an  estimated ejection fraction of 70%. Left ventricular cavity  size appears small (left ventricular internal diastolic  dimension 3.4 cm). Normal diastolic function.  Right Heart: Right atrium not well visualized. Right  ventricular enlargement with decreased right ventricular  systolic function.  Flattening of the interventricular  septum in systole consistent with right ventricular  pressure overload. Normal tricuspid valve structure.  Minimal tricuspid regurgitation. No pulmonic stenosis. Mild  pulmonic regurgitation.  Pericardium/Pleura: No pericardial effusion seen.  Hemodynamic: Inadequate tricuspid regurgitation Doppler  envelope precludes estimation of RVSP.  ------------------------------------------------------------------------  Conclusions:  Suboptimal image quality.  1. Left ventricular cavity size appears small (left  ventricular internal diastolic dimension 3.4 cm).  2. Not all wall segments were well visualized.  Normal  overall left ventricular systolic function with an  estimated ejection fraction of 70%.  3. Normal diastolic function.  4. Right ventricular enlargement with decreased right  ventricular systolic function.  Flattening of the  interventricular septum in systole consistent with right  ventricular pressure overload.  5. Inadequate tricuspid regurgitation Doppler envelope  precludes estimation of RVSP.  ------------------------------------------------------------------------  Confirmed on  2018 - 18:24:17 by Tavares Woo M.D.  ------------------------------------------------------------------------      CARDIAC CATH  < from: Cardiac Cath Lab - Adult (18 @ 15:14) >  [facility]  Department of Cardiology  [address] [place] [id number] [phone]  Cath Lab Report -- Comprehensive Report  Patient: ROXI MARIA  Study date: 2018  Account number: [id number]  MR number:[id number]  : 1980  Gender: Female  Race: O  Case Physician(s):  Norma Stringer M.D.  Referring Physician:  Girish Pierre M.D.  INDICATIONS: Primary pulmonary hypertension.  HISTORY: History includes pulmonary hypertension. The patient has  hypertension, medication-treated dyslipidemia, and a family history of  coronary artery disease.  PROCEDURE:  --  Right heart catheterization.  --  Sonosite - Diagnostic.  TECHNIQUE: The risks and alternatives of the procedures and conscious  sedation were explained to the patient and informed consent was obtained.  Cardiac catheterization performed electively.  Local anesthetic given. Right brachial vein access. Local anesthetic given.  A 5FR GLIDESHEATH was inserted in the vessel, utilizing themodified  Seldinger technique. Right heart catheterization. The procedure was  performed utilizing a 5fr Arrow catheter under fluoroscopic guidance.  Measurements of pressures, arterial and venous oxygen saturation, and  cardiac output (by Xenia usingassumed VO2) were obtained. The catheter was  removed. Sonosite - Diagnostic. RADIATION EXPOSURE: 0.9 min.  HEMODYNAMICS: Hemodynamic assessment demonstrates normal pulmonary  capillary wedge pressure. There is severe pulmonary hypertension.  COMPLICATIONS: There were no complications.  DIAGNOSTIC RECOMMENDATIONS: Medical management is recommended.  Prepared and signed by  Norma Stringer M.D.  Signed 2018 16:14:37  HEMODYNAMIC TABLES  Pressures:  Baseline  Pressures:  - HR: 88  Pressures:  - Rhythm:  Pressures:  -- Pulmonary Artery (S/D/M): 88/31/56  Pressures:  -- Pulmonary Capillary Wedge: 10/10/6  Pressures:  -- Right Atrium (a/v/M): 13/4/4  Pressures:  -- Right Ventricle (s/edp): 90/14/--  O2 Sats:  Baseline  O2 Sats:  - HR: 88  O2 Sats:  - Rhythm:  O2 Sats:  -- AO: 9.8/97/12.93  O2 Sats:  -- PA: 9.8/61.2/8.16  Outputs:  Baseline  Outputs:  -- CALCULATIONS[age]  Outputs:  -- CALCULATIONS: Body Surface Area: 2.05  Outputs:  -- CALCULATIONS: Height in cm: 167.00  Outputs:  -- CALCULATIONS: Sex: Female  Outputs:  -- CALCULATIONS: Weight in k.00  Outputs:  -- OUTPUTS: CO by Xenia: 5.76  Outputs:  -- OUTPUTS: Xenia cardiac index: 2.81  Outputs:  -- OUTPUTS: O2 consumption: 275.00  Outputs:  -- OUTPUTS: Vo2 Indexed: 134.00  Outputs:  -- RESISTANCES: Pulmonary vascular index (dsc): 1423.90  Outputs:  -- RESISTANCES: Pulmonary vascular index (Wood Units): 17.80  Outputs:  -- RESISTANCES: Pulmonary vascular resistance (dsc): 693.85  Outputs:  -- RESISTANCES: Pulmonary vascular resistance (Wood Units): 8.68  Outputs:  -- RESISTANCES: Right ventricular stroke work: 46.32  Outputs:  -- RESISTANCES: Right ventricular stroke work index: 22.57  Outputs:  -- RESISTANCES: Total pulmonary index (dsc): 1594.77  Outputs:  -- RESISTANCES: Total pulmonary index (Wood Units): 19.94  Outputs:  -- RESISTANCES: Total pulmonary resistance (dsc): 777.11  Outputs:  -- RESISTANCES: Total pulmonary resistance (Wood Units): 9.72  Outputs:  -- SHUNTS: Pulmonary flow: 5.76  Outputs:  -- SHUNTS: Qp Indexed: 2.81  Outputs:  -- SHUNTS: Qs Indexed: 2.81  Outputs:  -- SHUNTS: Systemic flow: 5.76    < end of copied text >

## 2018-05-14 LAB
ANION GAP SERPL CALC-SCNC: 20 MMOL/L — HIGH (ref 5–17)
APTT BLD: 132.1 SEC — CRITICAL HIGH (ref 27.5–37.4)
APTT BLD: 60.4 SEC — HIGH (ref 27.5–37.4)
APTT BLD: 66 SEC — HIGH (ref 27.5–37.4)
BUN SERPL-MCNC: 7 MG/DL — SIGNIFICANT CHANGE UP (ref 7–23)
CALCIUM SERPL-MCNC: 9 MG/DL — SIGNIFICANT CHANGE UP (ref 8.4–10.5)
CHLORIDE SERPL-SCNC: 98 MMOL/L — SIGNIFICANT CHANGE UP (ref 96–108)
CO2 SERPL-SCNC: 18 MMOL/L — LOW (ref 22–31)
CREAT SERPL-MCNC: 0.94 MG/DL — SIGNIFICANT CHANGE UP (ref 0.5–1.3)
GLUCOSE SERPL-MCNC: 92 MG/DL — SIGNIFICANT CHANGE UP (ref 70–99)
PHOSPHATE SERPL-MCNC: 4.6 MG/DL — HIGH (ref 2.5–4.5)
POTASSIUM SERPL-MCNC: 4 MMOL/L — SIGNIFICANT CHANGE UP (ref 3.5–5.3)
POTASSIUM SERPL-SCNC: 4 MMOL/L — SIGNIFICANT CHANGE UP (ref 3.5–5.3)
SODIUM SERPL-SCNC: 136 MMOL/L — SIGNIFICANT CHANGE UP (ref 135–145)

## 2018-05-14 PROCEDURE — 93308 TTE F-UP OR LMTD: CPT | Mod: 26

## 2018-05-14 PROCEDURE — 99291 CRITICAL CARE FIRST HOUR: CPT | Mod: 25

## 2018-05-14 PROCEDURE — 76604 US EXAM CHEST: CPT | Mod: 26

## 2018-05-14 RX ORDER — IPRATROPIUM BROMIDE 0.2 MG/ML
500 SOLUTION, NON-ORAL INHALATION EVERY 6 HOURS
Qty: 0 | Refills: 0 | Status: DISCONTINUED | OUTPATIENT
Start: 2018-05-14 | End: 2018-05-14

## 2018-05-14 RX ORDER — EPOPROSTENOL 0.5 MG/10ML
3 INJECTION, POWDER, LYOPHILIZED, FOR SOLUTION INTRAVENOUS
Qty: 500 | Refills: 0 | Status: DISCONTINUED | OUTPATIENT
Start: 2018-05-14 | End: 2018-05-15

## 2018-05-14 RX ORDER — FUROSEMIDE 40 MG
20 TABLET ORAL ONCE
Qty: 0 | Refills: 0 | Status: COMPLETED | OUTPATIENT
Start: 2018-05-14 | End: 2018-05-14

## 2018-05-14 RX ORDER — POTASSIUM PHOSPHATE, MONOBASIC POTASSIUM PHOSPHATE, DIBASIC 236; 224 MG/ML; MG/ML
15 INJECTION, SOLUTION INTRAVENOUS ONCE
Qty: 0 | Refills: 0 | Status: COMPLETED | OUTPATIENT
Start: 2018-05-14 | End: 2018-05-14

## 2018-05-14 RX ORDER — FAMOTIDINE 10 MG/ML
20 INJECTION INTRAVENOUS ONCE
Qty: 0 | Refills: 0 | Status: COMPLETED | OUTPATIENT
Start: 2018-05-14 | End: 2018-05-14

## 2018-05-14 RX ORDER — DOCUSATE SODIUM 100 MG
100 CAPSULE ORAL
Qty: 0 | Refills: 0 | Status: DISCONTINUED | OUTPATIENT
Start: 2018-05-14 | End: 2018-05-20

## 2018-05-14 RX ORDER — MIDODRINE HYDROCHLORIDE 2.5 MG/1
20 TABLET ORAL EVERY 8 HOURS
Qty: 0 | Refills: 0 | Status: DISCONTINUED | OUTPATIENT
Start: 2018-05-14 | End: 2018-05-18

## 2018-05-14 RX ORDER — SENNA PLUS 8.6 MG/1
2 TABLET ORAL AT BEDTIME
Qty: 0 | Refills: 0 | Status: DISCONTINUED | OUTPATIENT
Start: 2018-05-14 | End: 2018-05-20

## 2018-05-14 RX ORDER — IPRATROPIUM BROMIDE 0.2 MG/ML
500 SOLUTION, NON-ORAL INHALATION ONCE
Qty: 0 | Refills: 0 | Status: COMPLETED | OUTPATIENT
Start: 2018-05-14 | End: 2018-05-14

## 2018-05-14 RX ADMIN — POTASSIUM PHOSPHATE, MONOBASIC POTASSIUM PHOSPHATE, DIBASIC 62.5 MILLIMOLE(S): 236; 224 INJECTION, SOLUTION INTRAVENOUS at 13:04

## 2018-05-14 RX ADMIN — MIDODRINE HYDROCHLORIDE 20 MILLIGRAM(S): 2.5 TABLET ORAL at 13:05

## 2018-05-14 RX ADMIN — Medication 650 MILLIGRAM(S): at 01:10

## 2018-05-14 RX ADMIN — Medication 100 MILLIGRAM(S): at 22:10

## 2018-05-14 RX ADMIN — EPOPROSTENOL 3.49 NANOGRAM(S)/KG/MIN: 0.5 INJECTION, POWDER, LYOPHILIZED, FOR SOLUTION INTRAVENOUS at 19:23

## 2018-05-14 RX ADMIN — Medication 100 MILLIGRAM(S): at 17:57

## 2018-05-14 RX ADMIN — EPOPROSTENOL 2.33 NANOGRAM(S)/KG/MIN: 0.5 INJECTION, POWDER, LYOPHILIZED, FOR SOLUTION INTRAVENOUS at 15:44

## 2018-05-14 RX ADMIN — Medication 100 MILLIGRAM(S): at 13:04

## 2018-05-14 RX ADMIN — EPOPROSTENOL 2.33 NANOGRAM(S)/KG/MIN: 0.5 INJECTION, POWDER, LYOPHILIZED, FOR SOLUTION INTRAVENOUS at 07:16

## 2018-05-14 RX ADMIN — Medication 100 MILLIGRAM(S): at 06:23

## 2018-05-14 RX ADMIN — SENNA PLUS 2 TABLET(S): 8.6 TABLET ORAL at 22:10

## 2018-05-14 RX ADMIN — PANTOPRAZOLE SODIUM 40 MILLIGRAM(S): 20 TABLET, DELAYED RELEASE ORAL at 06:23

## 2018-05-14 RX ADMIN — Medication 325 MILLIGRAM(S): at 11:21

## 2018-05-14 RX ADMIN — SPIRONOLACTONE 25 MILLIGRAM(S): 25 TABLET, FILM COATED ORAL at 06:23

## 2018-05-14 RX ADMIN — FAMOTIDINE 20 MILLIGRAM(S): 10 INJECTION INTRAVENOUS at 01:10

## 2018-05-14 RX ADMIN — Medication 500 MICROGRAM(S): at 05:56

## 2018-05-14 RX ADMIN — MIDODRINE HYDROCHLORIDE 20 MILLIGRAM(S): 2.5 TABLET ORAL at 22:10

## 2018-05-14 RX ADMIN — Medication 20 MILLIGRAM(S): at 16:05

## 2018-05-14 RX ADMIN — HEPARIN SODIUM 9 UNIT(S)/HR: 5000 INJECTION INTRAVENOUS; SUBCUTANEOUS at 11:30

## 2018-05-14 RX ADMIN — HEPARIN SODIUM 9 UNIT(S)/HR: 5000 INJECTION INTRAVENOUS; SUBCUTANEOUS at 17:57

## 2018-05-14 RX ADMIN — Medication 650 MILLIGRAM(S): at 02:00

## 2018-05-14 RX ADMIN — Medication 1 TABLET(S): at 11:21

## 2018-05-14 RX ADMIN — EPOPROSTENOL 3.49 NANOGRAM(S)/KG/MIN: 0.5 INJECTION, POWDER, LYOPHILIZED, FOR SOLUTION INTRAVENOUS at 23:25

## 2018-05-14 RX ADMIN — MIDODRINE HYDROCHLORIDE 10 MILLIGRAM(S): 2.5 TABLET ORAL at 06:23

## 2018-05-14 NOTE — CHART NOTE - NSCHARTNOTEFT_GEN_A_CORE
Follow up.    Adm dx: pulmonary hypotension, transfer to MICU for flolan rx.    Source: Patient [x ]    Family [ ]     other [x ] chart    Diet : 5/13 Regular      Patient reports [ ] nausea  [ ] vomiting [ ] diarrhea [ ] constipation  [ ]chewing problems [ ] swallowing issues  [ ] other: last BM 5/11     PO intake:  < 50% [ ] 50-75% [ ]   % [x ]  other : reports eating well     Source for PO intake [x ] Patient [ ] family [ ] chart [ ] staff [ ] other     Enteral /Parenteral Nutrition: n/a      Current Weight: 5/14 205 lb, dosing wt 5/1 214 lb, 9lb wt loss, no edema, noted pt on aldactone rx      Pertinent Medications: MEDICATIONS  (STANDING):  benzonatate 100 milliGRAM(s) Oral three times a day  epoprostenol Infusion 2 NANOGram(s)/kG/Min (2.328 mL/Hr) IV Continuous <Continuous>  ferrous    sulfate 325 milliGRAM(s) Oral daily  heparin  Infusion 1200 Unit(s)/Hr (9 mL/Hr) IV Continuous <Continuous>  midodrine 20 milliGRAM(s) Oral every 8 hours  multivitamin 1 Tablet(s) Oral daily  norepinephrine Infusion 0.05 MICROgram(s)/kG/Min (9.094 mL/Hr) IV Continuous <Continuous>  pantoprazole    Tablet 40 milliGRAM(s) Oral before breakfast  Riociguat (Adempas) 2 milliGRAM(s) 2 milliGRAM(s) Oral three times a day  spironolactone 25 milliGRAM(s) Oral daily    MEDICATIONS  (PRN):  acetaminophen   Tablet. 650 milliGRAM(s) Oral every 6 hours PRN mild to moderate pain  guaiFENesin    Syrup 100 milliGRAM(s) Oral every 6 hours PRN Cough    Pertinent Labs:  05-13 Na137 mmol/L Glu 86 mg/dL K+ 3.4 mmol/L<L> Cr  1.05 mg/dL BUN 10 mg/dL 05-13 Phos 3.3 mg/dL      Skin: no pressure injuries    Estimated Needs:   [x ] no change since previous assessment  [ ] recalculated:       Previous Nutrition Diagnosis: none    New Nutrition Diagnosis: [x ] not applicable      Recommend    [ ] Change Diet To:    [ ] Nutrition Supplement    [ ] Nutrition Support    [x ] Other:  recommend continue regular diet       Monitoring and Evaluation:     [x ] PO intake [x ] Tolerance to diet prescription [x ] weights [x ] follow up per protocol    [ ] other:

## 2018-05-14 NOTE — PROGRESS NOTE ADULT - SUBJECTIVE AND OBJECTIVE BOX
INTERVAL HPI/OVERNIGHT EVENTS: 36 y/o F with hx of Pulmonary arterial hypertension who was admitted on 5/1 to medicine for SOB, unable to control her symptoms, transitioned to prostacyclin via IV and became hypotensive requiring vasopressors.   Overnight levophed dose was reduced. PTT was supratherapeutic and heparin was held and dose reduced to 9mg/kg/hr.     SUBJECTIVE: Patient seen and examined at bedside.     CONSTITUTIONAL: No weakness, fevers or chills  EYES/ENT: No visual changes;  No vertigo or throat pain   NECK: No pain or stiffness  RESPIRATORY: mild SOB. No cough, wheezing, hemoptysis.  CARDIOVASCULAR: No chest pain or palpitations  GASTROINTESTINAL: No abdominal or epigastric pain. No nausea, vomiting, or hematemesis; No diarrhea or constipation. No melena or hematochezia.  GENITOURINARY: No dysuria, frequency or hematuria  NEUROLOGICAL: No numbness or weakness  SKIN: No itching, rashes    OBJECTIVE:    VITAL SIGNS:  ICU Vital Signs Last 24 Hrs  T(C): 37.2 (14 May 2018 04:00), Max: 37.2 (14 May 2018 04:00)  T(F): 98.9 (14 May 2018 04:00), Max: 98.9 (14 May 2018 04:00)  HR: 101 (14 May 2018 12:15) (77 - 121)  BP: 97/50 (14 May 2018 12:15) (78/51 - 124/44)  BP(mean): 70 (14 May 2018 12:15) (60 - 82)  ABP: --  ABP(mean): --  RR: 20 (14 May 2018 12:15) (9 - 40)  SpO2: 95% (14 May 2018 12:15) (91% - 100%)        05-13 @ 07:01  -  05-14 @ 07:00  --------------------------------------------------------  IN: 508.8 mL / OUT: 150 mL / NET: 358.8 mL    05-14 @ 07:01 - 05-14 @ 12:27  --------------------------------------------------------  IN: 467.7 mL / OUT: 250 mL / NET: 217.7 mL      CAPILLARY BLOOD GLUCOSE          PHYSICAL EXAM:    General: NAD  HEENT: NC/AT; PERRL, clear conjunctiva  Neck: supple, right IJ in place with clean dressing  Respiratory: CTA b/l, no crackles/wheezing  Cardiovascular: +S1/S2; RRR  Abdomen: soft, NT/ND; +BS x4  Extremities: WWP, 2+ peripheral pulses b/l; no LE edema  Skin: normal color and turgor; no rash  Neurological: CN II-XII grossly intact, no focal deficit.     MEDICATIONS:  MEDICATIONS  (STANDING):  benzonatate 100 milliGRAM(s) Oral three times a day  epoprostenol Infusion 2 NANOGram(s)/kG/Min (2.328 mL/Hr) IV Continuous <Continuous>  ferrous    sulfate 325 milliGRAM(s) Oral daily  heparin  Infusion 1200 Unit(s)/Hr (9 mL/Hr) IV Continuous <Continuous>  midodrine 20 milliGRAM(s) Oral every 8 hours  multivitamin 1 Tablet(s) Oral daily  norepinephrine Infusion 0.05 MICROgram(s)/kG/Min (9.094 mL/Hr) IV Continuous <Continuous>  pantoprazole    Tablet 40 milliGRAM(s) Oral before breakfast  Riociguat (Adempas) 2 milliGRAM(s) 2 milliGRAM(s) Oral three times a day  spironolactone 25 milliGRAM(s) Oral daily    MEDICATIONS  (PRN):  acetaminophen   Tablet. 650 milliGRAM(s) Oral every 6 hours PRN mild to moderate pain  guaiFENesin    Syrup 100 milliGRAM(s) Oral every 6 hours PRN Cough      ALLERGIES:  Allergies    No Known Allergies    Intolerances        LABS:                        10.7   11.46 )-----------( 346      ( 13 May 2018 11:25 )             34.4     05-13    137  |  100  |  10  ----------------------------<  86  3.4<L>   |  22  |  1.05    Ca    9.5      13 May 2018 08:46  Phos  3.3     05-13  Mg     2.2     05-13      PT/INR - ( 13 May 2018 10:57 )   PT: 12.8 sec;   INR: 1.18 ratio         PTT - ( 14 May 2018 11:46 )  PTT:60.4 sec      RADIOLOGY & ADDITIONAL TESTS: Reviewed.

## 2018-05-14 NOTE — PROGRESS NOTE ADULT - ASSESSMENT
37yoF admitted to the MICU to institute [facility] therapy for baseline pulmonary HTN, patient subsequently developed hypotension 2/2 medication and was started on vasopressors. Patient at this time, followed by pulmonary team, goal is to take off vasopressor support and maintain prostacyclin, with improvement of function, CO and pulmonary compliance. Currently managed with heparin gtt for chronic PE.     Neuro- no active issues  -monitor MS  -OOB, PT    Skin- right IJ in place for vasopressors, clean dressing.    GI- no active issues  -hx of GERD on pantoprazole 40mg daily  -regular diet    Metabolic/renal-   -hypokalemia + hypophosphatemia- will replace with Potassium phosphate and repeat BMP, Phos    Heme-   -on heparin gtt for hx of chronic PE, currently at 9 units with 1 therapeutic PTT as per normogram, will repeat at 17:30 and titrate as protocol  -monitor for bleeding  -hx of JULIA on ferrous sulfate, MV    ID- no active issues  -monitor Tmax and WBC    CV: Hx Pulm Htn diagnosed in 2014, right heart cath on 5/5 with elevated right pressures  -continue with epoprostenol gtt, titrates as tolerated  -will assess with daily bedside echo with heart function   -continue riociguat   -hypotension 2/2 medication, continue to titrate levophed and increased midodrine to 20mg TID    Pulm: Asthma / Pulm Htn/ PE (hx)  - continue with pulm HTN treatment as above  -monitor O2 sat, O2 supplementation via NC at nighttime  -d/c ipratropium, if wheezing re-order    Rylie Salter PGY2

## 2018-05-14 NOTE — PROGRESS NOTE ADULT - ATTENDING COMMENTS
Critically ill with severe PAH on prostacylin infusion  Frequent bedside visits with therapy change today. Crit Care Time Today 35 min +

## 2018-05-15 LAB
ALBUMIN SERPL ELPH-MCNC: 4.3 G/DL — SIGNIFICANT CHANGE UP (ref 3.3–5)
ALBUMIN SERPL ELPH-MCNC: 4.6 G/DL — SIGNIFICANT CHANGE UP (ref 3.3–5)
ALP SERPL-CCNC: 41 U/L — SIGNIFICANT CHANGE UP (ref 40–120)
ALP SERPL-CCNC: 42 U/L — SIGNIFICANT CHANGE UP (ref 40–120)
ALT FLD-CCNC: 12 U/L — SIGNIFICANT CHANGE UP (ref 10–45)
ALT FLD-CCNC: 14 U/L — SIGNIFICANT CHANGE UP (ref 10–45)
ANION GAP SERPL CALC-SCNC: 19 MMOL/L — HIGH (ref 5–17)
ANION GAP SERPL CALC-SCNC: 20 MMOL/L — HIGH (ref 5–17)
APTT BLD: 33.2 SEC — SIGNIFICANT CHANGE UP (ref 27.5–37.4)
APTT BLD: 55.5 SEC — HIGH (ref 27.5–37.4)
APTT BLD: 57.7 SEC — HIGH (ref 27.5–37.4)
AST SERPL-CCNC: 13 U/L — SIGNIFICANT CHANGE UP (ref 10–40)
AST SERPL-CCNC: 15 U/L — SIGNIFICANT CHANGE UP (ref 10–40)
BILIRUB SERPL-MCNC: 0.5 MG/DL — SIGNIFICANT CHANGE UP (ref 0.2–1.2)
BILIRUB SERPL-MCNC: 0.8 MG/DL — SIGNIFICANT CHANGE UP (ref 0.2–1.2)
BLD GP AB SCN SERPL QL: NEGATIVE — SIGNIFICANT CHANGE UP
BUN SERPL-MCNC: 10 MG/DL — SIGNIFICANT CHANGE UP (ref 7–23)
BUN SERPL-MCNC: 6 MG/DL — LOW (ref 7–23)
CALCIUM SERPL-MCNC: 9.4 MG/DL — SIGNIFICANT CHANGE UP (ref 8.4–10.5)
CALCIUM SERPL-MCNC: 9.4 MG/DL — SIGNIFICANT CHANGE UP (ref 8.4–10.5)
CHLORIDE SERPL-SCNC: 96 MMOL/L — SIGNIFICANT CHANGE UP (ref 96–108)
CHLORIDE SERPL-SCNC: 98 MMOL/L — SIGNIFICANT CHANGE UP (ref 96–108)
CO2 SERPL-SCNC: 18 MMOL/L — LOW (ref 22–31)
CO2 SERPL-SCNC: 19 MMOL/L — LOW (ref 22–31)
CREAT SERPL-MCNC: 0.95 MG/DL — SIGNIFICANT CHANGE UP (ref 0.5–1.3)
CREAT SERPL-MCNC: 1.01 MG/DL — SIGNIFICANT CHANGE UP (ref 0.5–1.3)
GLUCOSE SERPL-MCNC: 94 MG/DL — SIGNIFICANT CHANGE UP (ref 70–99)
GLUCOSE SERPL-MCNC: 98 MG/DL — SIGNIFICANT CHANGE UP (ref 70–99)
HCT VFR BLD CALC: 35.6 % — SIGNIFICANT CHANGE UP (ref 34.5–45)
HCT VFR BLD CALC: 37.6 % — SIGNIFICANT CHANGE UP (ref 34.5–45)
HGB BLD-MCNC: 11.5 G/DL — SIGNIFICANT CHANGE UP (ref 11.5–15.5)
HGB BLD-MCNC: 11.6 G/DL — SIGNIFICANT CHANGE UP (ref 11.5–15.5)
INR BLD: 1.13 RATIO — SIGNIFICANT CHANGE UP (ref 0.88–1.16)
INR BLD: 1.15 RATIO — SIGNIFICANT CHANGE UP (ref 0.88–1.16)
MAGNESIUM SERPL-MCNC: 1.7 MG/DL — SIGNIFICANT CHANGE UP (ref 1.6–2.6)
MAGNESIUM SERPL-MCNC: 1.8 MG/DL — SIGNIFICANT CHANGE UP (ref 1.6–2.6)
MCHC RBC-ENTMCNC: 20.3 PG — LOW (ref 27–34)
MCHC RBC-ENTMCNC: 20.9 PG — LOW (ref 27–34)
MCHC RBC-ENTMCNC: 30.9 GM/DL — LOW (ref 32–36)
MCHC RBC-ENTMCNC: 32.3 GM/DL — SIGNIFICANT CHANGE UP (ref 32–36)
MCV RBC AUTO: 64.7 FL — LOW (ref 80–100)
MCV RBC AUTO: 65.7 FL — LOW (ref 80–100)
PHOSPHATE SERPL-MCNC: 3.7 MG/DL — SIGNIFICANT CHANGE UP (ref 2.5–4.5)
PHOSPHATE SERPL-MCNC: 4.2 MG/DL — SIGNIFICANT CHANGE UP (ref 2.5–4.5)
PLATELET # BLD AUTO: 318 K/UL — SIGNIFICANT CHANGE UP (ref 150–400)
PLATELET # BLD AUTO: 373 K/UL — SIGNIFICANT CHANGE UP (ref 150–400)
POTASSIUM SERPL-MCNC: 3.6 MMOL/L — SIGNIFICANT CHANGE UP (ref 3.5–5.3)
POTASSIUM SERPL-MCNC: 3.6 MMOL/L — SIGNIFICANT CHANGE UP (ref 3.5–5.3)
POTASSIUM SERPL-SCNC: 3.6 MMOL/L — SIGNIFICANT CHANGE UP (ref 3.5–5.3)
POTASSIUM SERPL-SCNC: 3.6 MMOL/L — SIGNIFICANT CHANGE UP (ref 3.5–5.3)
PROT SERPL-MCNC: 7.6 G/DL — SIGNIFICANT CHANGE UP (ref 6–8.3)
PROT SERPL-MCNC: 7.8 G/DL — SIGNIFICANT CHANGE UP (ref 6–8.3)
PROTHROM AB SERPL-ACNC: 12.4 SEC — SIGNIFICANT CHANGE UP (ref 9.8–12.7)
PROTHROM AB SERPL-ACNC: 12.5 SEC — SIGNIFICANT CHANGE UP (ref 9.8–12.7)
RBC # BLD: 5.49 M/UL — HIGH (ref 3.8–5.2)
RBC # BLD: 5.72 M/UL — HIGH (ref 3.8–5.2)
RBC # FLD: 18.9 % — HIGH (ref 10.3–14.5)
RBC # FLD: 19.4 % — HIGH (ref 10.3–14.5)
RH IG SCN BLD-IMP: POSITIVE — SIGNIFICANT CHANGE UP
SODIUM SERPL-SCNC: 134 MMOL/L — LOW (ref 135–145)
SODIUM SERPL-SCNC: 136 MMOL/L — SIGNIFICANT CHANGE UP (ref 135–145)
WBC # BLD: 14.2 K/UL — HIGH (ref 3.8–10.5)
WBC # BLD: 19.8 K/UL — HIGH (ref 3.8–10.5)
WBC # FLD AUTO: 14.2 K/UL — HIGH (ref 3.8–10.5)
WBC # FLD AUTO: 19.8 K/UL — HIGH (ref 3.8–10.5)

## 2018-05-15 PROCEDURE — 93308 TTE F-UP OR LMTD: CPT | Mod: 26

## 2018-05-15 PROCEDURE — 36556 INSERT NON-TUNNEL CV CATH: CPT | Mod: GC

## 2018-05-15 PROCEDURE — 76604 US EXAM CHEST: CPT | Mod: 26

## 2018-05-15 PROCEDURE — 99291 CRITICAL CARE FIRST HOUR: CPT | Mod: 25

## 2018-05-15 RX ORDER — POLYETHYLENE GLYCOL 3350 17 G/17G
17 POWDER, FOR SOLUTION ORAL DAILY
Qty: 0 | Refills: 0 | Status: DISCONTINUED | OUTPATIENT
Start: 2018-05-15 | End: 2018-05-15

## 2018-05-15 RX ORDER — FAMOTIDINE 10 MG/ML
20 INJECTION INTRAVENOUS ONCE
Qty: 0 | Refills: 0 | Status: DISCONTINUED | OUTPATIENT
Start: 2018-05-15 | End: 2018-05-15

## 2018-05-15 RX ORDER — FUROSEMIDE 40 MG
20 TABLET ORAL ONCE
Qty: 0 | Refills: 0 | Status: COMPLETED | OUTPATIENT
Start: 2018-05-15 | End: 2018-05-15

## 2018-05-15 RX ORDER — LIDOCAINE HCL 20 MG/ML
20 VIAL (ML) INJECTION ONCE
Qty: 0 | Refills: 0 | Status: DISCONTINUED | OUTPATIENT
Start: 2018-05-15 | End: 2018-05-16

## 2018-05-15 RX ORDER — IPRATROPIUM BROMIDE 0.2 MG/ML
500 SOLUTION, NON-ORAL INHALATION EVERY 6 HOURS
Qty: 0 | Refills: 0 | Status: DISCONTINUED | OUTPATIENT
Start: 2018-05-15 | End: 2018-05-19

## 2018-05-15 RX ORDER — FAMOTIDINE 10 MG/ML
20 INJECTION INTRAVENOUS ONCE
Qty: 0 | Refills: 0 | Status: COMPLETED | OUTPATIENT
Start: 2018-05-15 | End: 2018-05-15

## 2018-05-15 RX ORDER — POLYETHYLENE GLYCOL 3350 17 G/17G
17 POWDER, FOR SOLUTION ORAL DAILY
Qty: 0 | Refills: 0 | Status: DISCONTINUED | OUTPATIENT
Start: 2018-05-15 | End: 2018-05-16

## 2018-05-15 RX ORDER — EPOPROSTENOL 0.5 MG/10ML
4 INJECTION, POWDER, LYOPHILIZED, FOR SOLUTION INTRAVENOUS
Qty: 500 | Refills: 0 | Status: DISCONTINUED | OUTPATIENT
Start: 2018-05-15 | End: 2018-05-16

## 2018-05-15 RX ADMIN — EPOPROSTENOL 3.49 NANOGRAM(S)/KG/MIN: 0.5 INJECTION, POWDER, LYOPHILIZED, FOR SOLUTION INTRAVENOUS at 07:21

## 2018-05-15 RX ADMIN — POLYETHYLENE GLYCOL 3350 17 GRAM(S): 17 POWDER, FOR SOLUTION ORAL at 10:17

## 2018-05-15 RX ADMIN — EPOPROSTENOL 4.66 NANOGRAM(S)/KG/MIN: 0.5 INJECTION, POWDER, LYOPHILIZED, FOR SOLUTION INTRAVENOUS at 15:23

## 2018-05-15 RX ADMIN — Medication 100 MILLIGRAM(S): at 13:48

## 2018-05-15 RX ADMIN — EPOPROSTENOL 4.66 NANOGRAM(S)/KG/MIN: 0.5 INJECTION, POWDER, LYOPHILIZED, FOR SOLUTION INTRAVENOUS at 22:23

## 2018-05-15 RX ADMIN — Medication 650 MILLIGRAM(S): at 17:50

## 2018-05-15 RX ADMIN — SPIRONOLACTONE 25 MILLIGRAM(S): 25 TABLET, FILM COATED ORAL at 06:00

## 2018-05-15 RX ADMIN — Medication 1 MILLIGRAM(S): at 16:45

## 2018-05-15 RX ADMIN — Medication 100 MILLIGRAM(S): at 06:00

## 2018-05-15 RX ADMIN — HEPARIN SODIUM 10 UNIT(S)/HR: 5000 INJECTION INTRAVENOUS; SUBCUTANEOUS at 03:23

## 2018-05-15 RX ADMIN — EPOPROSTENOL 4.66 NANOGRAM(S)/KG/MIN: 0.5 INJECTION, POWDER, LYOPHILIZED, FOR SOLUTION INTRAVENOUS at 08:06

## 2018-05-15 RX ADMIN — Medication 500 MICROGRAM(S): at 15:23

## 2018-05-15 RX ADMIN — Medication 104 MILLIGRAM(S): at 03:15

## 2018-05-15 RX ADMIN — MIDODRINE HYDROCHLORIDE 20 MILLIGRAM(S): 2.5 TABLET ORAL at 13:46

## 2018-05-15 RX ADMIN — MIDODRINE HYDROCHLORIDE 20 MILLIGRAM(S): 2.5 TABLET ORAL at 06:00

## 2018-05-15 RX ADMIN — SENNA PLUS 2 TABLET(S): 8.6 TABLET ORAL at 21:48

## 2018-05-15 RX ADMIN — Medication 1 TABLET(S): at 10:17

## 2018-05-15 RX ADMIN — PANTOPRAZOLE SODIUM 40 MILLIGRAM(S): 20 TABLET, DELAYED RELEASE ORAL at 06:29

## 2018-05-15 RX ADMIN — Medication 500 MICROGRAM(S): at 08:56

## 2018-05-15 RX ADMIN — FAMOTIDINE 20 MILLIGRAM(S): 10 INJECTION INTRAVENOUS at 22:13

## 2018-05-15 RX ADMIN — Medication 100 MILLIGRAM(S): at 17:34

## 2018-05-15 RX ADMIN — Medication 650 MILLIGRAM(S): at 18:22

## 2018-05-15 RX ADMIN — MIDODRINE HYDROCHLORIDE 20 MILLIGRAM(S): 2.5 TABLET ORAL at 21:48

## 2018-05-15 NOTE — PROGRESS NOTE ADULT - SUBJECTIVE AND OBJECTIVE BOX
INTERVAL HPI/OVERNIGHT EVENTS: 38 y/o F with hx of Pulmonary arterial hypertension who was admitted on 5/1 to medicine for SOB, unable to control her symptoms, transitioned to prostacyclin via IV and became hypotensive requiring vasopressors.   Overnight complaining of dizziness and palpitations. This AM constipation and heartburn.     SUBJECTIVE: Patient seen and examined at bedside.     CONSTITUTIONAL: No weakness, fevers or chills  EYES/ENT: No visual changes;  No vertigo or throat pain   NECK: No pain or stiffness  RESPIRATORY: No cough, wheezing, hemoptysis; No shortness of breath  CARDIOVASCULAR: No chest pain or palpitations  GASTROINTESTINAL: +constipation, + heartburn. No nausea, vomiting, or hematemesis; No diarrhea. No melena or hematochezia.  GENITOURINARY: No dysuria, frequency or hematuria  NEUROLOGICAL: No numbness or weakness  SKIN: No itching, rashes    OBJECTIVE:    VITAL SIGNS:  ICU Vital Signs Last 24 Hrs  T(C): 37 (15 May 2018 03:00), Max: 37.5 (14 May 2018 23:00)  T(F): 98.6 (15 May 2018 03:00), Max: 99.5 (14 May 2018 23:00)  HR: 122 (15 May 2018 07:00) (89 - 143)  BP: 107/61 (15 May 2018 07:00) (87/57 - 138/85)  BP(mean): 77 (15 May 2018 07:00) (61 - 102)  ABP: --  ABP(mean): --  RR: 18 (15 May 2018 07:00) (9 - 29)  SpO2: 99% (15 May 2018 07:00) (91% - 100%)        05-14 @ 07:01  -  05-15 @ 07:00  --------------------------------------------------------  IN: 1240.6 mL / OUT: 1990 mL / NET: -749.4 mL      CAPILLARY BLOOD GLUCOSE          PHYSICAL EXAM:    General: NAD  HEENT: NC/AT; PERRL, clear conjunctiva  Neck: supple  Respiratory: CTA b/l  Cardiovascular: +S1/S2; RRR  Abdomen: soft, NT/ND; +BS x4  Extremities: WWP, 2+ peripheral pulses b/l; no LE edema  Skin: normal color and turgor; no rash  Neurological:    MEDICATIONS:  MEDICATIONS  (STANDING):  benzonatate 100 milliGRAM(s) Oral three times a day  docusate sodium 100 milliGRAM(s) Oral two times a day  epoprostenol Infusion 3 NANOGram(s)/kG/Min (3.492 mL/Hr) IV Continuous <Continuous>  ferrous    sulfate 325 milliGRAM(s) Oral daily  heparin  Infusion 1200 Unit(s)/Hr (10 mL/Hr) IV Continuous <Continuous>  midodrine 20 milliGRAM(s) Oral every 8 hours  multivitamin 1 Tablet(s) Oral daily  norepinephrine Infusion 0.05 MICROgram(s)/kG/Min (9.094 mL/Hr) IV Continuous <Continuous>  pantoprazole    Tablet 40 milliGRAM(s) Oral before breakfast  Riociguat (Adempas) 2 milliGRAM(s) 2 milliGRAM(s) Oral three times a day  senna 2 Tablet(s) Oral at bedtime  spironolactone 25 milliGRAM(s) Oral daily    MEDICATIONS  (PRN):  acetaminophen   Tablet. 650 milliGRAM(s) Oral every 6 hours PRN mild to moderate pain  guaiFENesin    Syrup 100 milliGRAM(s) Oral every 6 hours PRN Cough      ALLERGIES:  Allergies    No Known Allergies    Intolerances        LABS:                        11.6   19.8  )-----------( 373      ( 15 May 2018 00:36 )             37.6     05-15    136  |  98  |  6<L>  ----------------------------<  94  3.6   |  18<L>  |  0.95    Ca    9.4      15 May 2018 00:36  Phos  3.7     05-15  Mg     1.8     05-15    TPro  7.8  /  Alb  4.6  /  TBili  0.5  /  DBili  x   /  AST  15  /  ALT  12  /  AlkPhos  42  05-15    PT/INR - ( 15 May 2018 00:36 )   PT: 12.4 sec;   INR: 1.13 ratio         PTT - ( 15 May 2018 00:36 )  PTT:55.5 sec      RADIOLOGY & ADDITIONAL TESTS: Reviewed. INTERVAL HPI/OVERNIGHT EVENTS: 36 y/o F with hx of Pulmonary arterial hypertension who was admitted on 5/1 to medicine for SOB, unable to control her symptoms, transitioned to prostacyclin via IV and became hypotensive requiring vasopressors.   Overnight complaining of dizziness and palpitations. This AM constipation and heartburn.     SUBJECTIVE: Patient seen and examined at bedside.     CONSTITUTIONAL: No weakness, fevers or chills  EYES/ENT: No visual changes;  No vertigo or throat pain   NECK: No pain or stiffness  RESPIRATORY: No cough, wheezing, hemoptysis; No shortness of breath  CARDIOVASCULAR: No chest pain or palpitations  GASTROINTESTINAL: +constipation, + heartburn. No nausea, vomiting, or hematemesis; No diarrhea. No melena or hematochezia.  GENITOURINARY: No dysuria, frequency or hematuria  NEUROLOGICAL: No numbness or weakness  SKIN: No itching, rashes    OBJECTIVE:    VITAL SIGNS:  ICU Vital Signs Last 24 Hrs  T(C): 37 (15 May 2018 03:00), Max: 37.5 (14 May 2018 23:00)  T(F): 98.6 (15 May 2018 03:00), Max: 99.5 (14 May 2018 23:00)  HR: 122 (15 May 2018 07:00) (89 - 143)  BP: 107/61 (15 May 2018 07:00) (87/57 - 138/85)  BP(mean): 77 (15 May 2018 07:00) (61 - 102)  ABP: --  ABP(mean): --  RR: 18 (15 May 2018 07:00) (9 - 29)  SpO2: 99% (15 May 2018 07:00) (91% - 100%)        05-14 @ 07:01  -  05-15 @ 07:00  --------------------------------------------------------  IN: 1240.6 mL / OUT: 1990 mL / NET: -749.4 mL      CAPILLARY BLOOD GLUCOSE          PHYSICAL EXAM:    General: NAD  HEENT: NC/AT; PERRL, clear conjunctiva  Neck: supple, right IJ in place  Respiratory: CTA b/l  Cardiovascular: +S1/S2; RRR  Abdomen: soft, NT/ND; +BS x4  Extremities: WWP, 2+ peripheral pulses b/l; no LE edema  Skin: normal color and turgor; no rash  Neurological: AOx3    MEDICATIONS:  MEDICATIONS  (STANDING):  benzonatate 100 milliGRAM(s) Oral three times a day  docusate sodium 100 milliGRAM(s) Oral two times a day  epoprostenol Infusion 3 NANOGram(s)/kG/Min (3.492 mL/Hr) IV Continuous <Continuous>  ferrous    sulfate 325 milliGRAM(s) Oral daily  heparin  Infusion 1200 Unit(s)/Hr (10 mL/Hr) IV Continuous <Continuous>  midodrine 20 milliGRAM(s) Oral every 8 hours  multivitamin 1 Tablet(s) Oral daily  norepinephrine Infusion 0.05 MICROgram(s)/kG/Min (9.094 mL/Hr) IV Continuous <Continuous>  pantoprazole    Tablet 40 milliGRAM(s) Oral before breakfast  Riociguat (Adempas) 2 milliGRAM(s) 2 milliGRAM(s) Oral three times a day  senna 2 Tablet(s) Oral at bedtime  spironolactone 25 milliGRAM(s) Oral daily    MEDICATIONS  (PRN):  acetaminophen   Tablet. 650 milliGRAM(s) Oral every 6 hours PRN mild to moderate pain  guaiFENesin    Syrup 100 milliGRAM(s) Oral every 6 hours PRN Cough      ALLERGIES:  Allergies    No Known Allergies    Intolerances        LABS:                        11.6   19.8  )-----------( 373      ( 15 May 2018 00:36 )             37.6     05-15    136  |  98  |  6<L>  ----------------------------<  94  3.6   |  18<L>  |  0.95    Ca    9.4      15 May 2018 00:36  Phos  3.7     05-15  Mg     1.8     05-15    TPro  7.8  /  Alb  4.6  /  TBili  0.5  /  DBili  x   /  AST  15  /  ALT  12  /  AlkPhos  42  05-15    PT/INR - ( 15 May 2018 00:36 )   PT: 12.4 sec;   INR: 1.13 ratio         PTT - ( 15 May 2018 00:36 )  PTT:55.5 sec      RADIOLOGY & ADDITIONAL TESTS: Reviewed.

## 2018-05-15 NOTE — PROGRESS NOTE ADULT - ATTENDING COMMENTS
Critically ill on pressors with PAH For titration of prostacylin infusion   Frequent bedside visits with therapy change today. Crit Care Time Today 35 min +

## 2018-05-15 NOTE — PROVIDER CONTACT NOTE (OTHER) - ACTION/TREATMENT ORDERED:
MD assessed VS-As per Deborah GOINS it is safe to give Procardia. Will monitor pt closely.
MD sanchez and Procardia held. Will monitor pt closely.
Pt's abdomen assessed by Dr. Salter
Decrease flolan gtt to 3nano/kg
MD aware-states no intervention needed at this time. Will continue to monitor pt closely.
Give IV push 5 mg of Lopressor.  Continue to monitor and assess.
Right arm elevated on pillows refused Tylenol for now  will continue to monitor

## 2018-05-15 NOTE — CHART NOTE - NSCHARTNOTEFT_GEN_A_CORE
: Deepa Hastings MD    INDICATION: pulmonary HTN    PROCEDURE:  [x] LIMITED ECHO  [x] LIMITED CHEST  [ ] LIMITED RETROPERITONEAL  [ ] LIMITED ABDOMINAL  [ ] LIMITED DVT  [ ] NEEDLE GUIDANCE VASCULAR  [ ] NEEDLE GUIDANCE THORACENTESIS  [ ] NEEDLE GUIDANCE PARACENTESIS  [ ] NEEDLE GUIDANCE PERICARDIOCENTESIS  [ ] OTHER    FINDINGS:    Chest: A line predominant pattern bilaterally, no effusions.   Heart: Normal LV function. RV is not enlarged. Thickening of the RV free wall. No effusion.     INTERPRETATION: Normal aeration pattern. Normal LV function. RV not enlarged.

## 2018-05-15 NOTE — PROVIDER CONTACT NOTE (OTHER) - SITUATION
Should I hold Procardia?
/75
Dr. Salter made aware pt noted to vomit dark brown liquids
Pt c/o heart racing and nausea
Pt feeling flushed tachycardic to 140's c/o dyspnea
Pt sustaining HR of 130's
Right arm pain

## 2018-05-15 NOTE — PROGRESS NOTE ADULT - ASSESSMENT
37yoF admitted to the MICU to institute Flolan therapy for baseline pulmonary HTN, patient subsequently developed hypotension 2/2 medication and was started on vasopressors. Patient at this time, followed by pulmonary team, goal is to take off vasopressor support and maintain prostacyclin, with improvement of function, CO and pulmonary compliance. Currently managed with heparin gtt for chronic PE.     Neuro- no active issues  -monitor MS  -OOB, PT    Skin- right IJ in place for vasopressors, clean dressing.    GI- no active issues  -hx of GERD on pantoprazole 40mg daily  -regular diet    Metabolic/renal- no active issues    Heme-   -on heparin gtt for hx of chronic PE, currently at 9 units with 1 therapeutic PTT as per normogram, will repeat at 17:30 and titrate as protocol  -monitor for bleeding  -hx of JULIA on ferrous sulfate, MV    ID- no active issues  -monitor Tmax and WBC    CV: Hx Pulm Htn diagnosed in 2014, right heart cath on 5/5 with elevated right pressures  -continue with epoprostenol gtt, titrates as tolerated  -will assess with daily bedside echo with heart function   -continue riociguat   -hypotension 2/2 medication, continue to titrate levophed and increased midodrine to 20mg TID    Pulm: Asthma / Pulm Htn/ PE (hx)  - continue with pulm HTN treatment as above  -monitor O2 sat, O2 supplementation via NC at nighttime    Rylie Salter PGY2 37yoF admitted to the MICU to institute Flolan therapy for baseline pulmonary HTN, patient subsequently developed hypotension 2/2 medication and was started on vasopressors. Patient at this time, followed by pulmonary team, goal is to take off vasopressor support and maintain prostacyclin, with improvement of function, CO and pulmonary compliance. Currently managed with heparin gtt for chronic PE. Will place swan nick catheter to measure pressures directly to assess for treatment effectiveness.     Neuro- no active issues  -monitor MS  -OOB, PT    Skin- right IJ in place for vasopressors, clean dressing  -will place peripheral IV for pressor use.    GI- constipation  -start bowel regimen, add lactulose, miralax  -hx of GERD on pantoprazole 40mg daily  -regular diet    Metabolic/renal- no active issues    Heme-   -on heparin gtt for hx of chronic PE, currently being held for procedure, will re-start after procedure and re-measure PTT q6h per protocol  -monitor for bleeding  -hx of JULIA on ferrous sulfate, MV    ID- no active issues, increasing WBC with no evidence of infection.   -monitor Tmax and WBC    CV: Hx Pulm Htn diagnosed in 2014, right heart cath on 5/5 with elevated right pressures; swan nick catheterization at bedside to evaluate for PA pressures.   -continue with epoprostenol gtt, titrates as tolerated  -continue riociguat   -hypotension 2/2 medication, continue to titrate levophed  -midodrine 20mg TID    Pulm: Asthma / Pulm Htn/ PE (hx)  - continue with pulm HTN treatment as above  -monitor O2 sat, O2 supplementation via NC at nighttime    Rylie Salter PGY2

## 2018-05-15 NOTE — PROVIDER CONTACT NOTE (OTHER) - ASSESSMENT
VS as documented
/75 no distress noted
Pt VSS. Pt reported last intake of brown food was a soup 2 days ago.
VS as documented, no acute distress noted. Pt states she no longer feels her heart is racing. Pt still has c/o nausea.
Pt A&oX4. Pt. denies SOB, chest pain, difficulty breathing. Pt is asymptomatic. Pt denies any palpations..
Right arm dressing dry intact no hematoma noted pulse  able to move digits freely present

## 2018-05-16 DIAGNOSIS — J96.11 CHRONIC RESPIRATORY FAILURE WITH HYPOXIA: ICD-10-CM

## 2018-05-16 DIAGNOSIS — I95.9 HYPOTENSION, UNSPECIFIED: ICD-10-CM

## 2018-05-16 DIAGNOSIS — I50.22 CHRONIC SYSTOLIC (CONGESTIVE) HEART FAILURE: ICD-10-CM

## 2018-05-16 DIAGNOSIS — I27.21 SECONDARY PULMONARY ARTERIAL HYPERTENSION: ICD-10-CM

## 2018-05-16 LAB
BASE EXCESS BLDV CALC-SCNC: -0.7 MMOL/L — SIGNIFICANT CHANGE UP (ref -2–2)
CA-I SERPL-SCNC: 1.13 MMOL/L — SIGNIFICANT CHANGE UP (ref 1.12–1.3)
CHLORIDE BLDV-SCNC: 104 MMOL/L — SIGNIFICANT CHANGE UP (ref 96–108)
CO2 BLDV-SCNC: 23 MMOL/L — SIGNIFICANT CHANGE UP (ref 22–30)
GAS PNL BLDV: 132 MMOL/L — LOW (ref 136–145)
GAS PNL BLDV: SIGNIFICANT CHANGE UP
GAS PNL BLDV: SIGNIFICANT CHANGE UP
GLUCOSE BLDV-MCNC: 98 MG/DL — SIGNIFICANT CHANGE UP (ref 70–99)
HCO3 BLDV-SCNC: 22 MMOL/L — SIGNIFICANT CHANGE UP (ref 21–29)
HCT VFR BLDA CALC: 35 % — LOW (ref 39–50)
HGB BLD CALC-MCNC: 11.3 G/DL — LOW (ref 11.5–15.5)
HOROWITZ INDEX BLDV+IHG-RTO: 21 — SIGNIFICANT CHANGE UP
LACTATE BLDV-MCNC: 1.5 MMOL/L — SIGNIFICANT CHANGE UP (ref 0.7–2)
OTHER CELLS CSF MANUAL: 8 ML/DL — LOW (ref 18–22)
PCO2 BLDV: 33 MMHG — LOW (ref 35–50)
PH BLDV: 7.45 — SIGNIFICANT CHANGE UP (ref 7.35–7.45)
PO2 BLDV: 32 MMHG — SIGNIFICANT CHANGE UP (ref 25–45)
POTASSIUM BLDV-SCNC: 3.8 MMOL/L — SIGNIFICANT CHANGE UP (ref 3.5–5)
PROCALCITONIN SERPL-MCNC: <0.05 NG/ML — SIGNIFICANT CHANGE UP (ref 0–0.04)
SAO2 % BLDV: 54 % — LOW (ref 67–88)

## 2018-05-16 PROCEDURE — 71045 X-RAY EXAM CHEST 1 VIEW: CPT | Mod: 26

## 2018-05-16 PROCEDURE — 99291 CRITICAL CARE FIRST HOUR: CPT | Mod: 25

## 2018-05-16 PROCEDURE — 93010 ELECTROCARDIOGRAM REPORT: CPT

## 2018-05-16 PROCEDURE — 76604 US EXAM CHEST: CPT | Mod: 26

## 2018-05-16 PROCEDURE — 99291 CRITICAL CARE FIRST HOUR: CPT

## 2018-05-16 PROCEDURE — 36556 INSERT NON-TUNNEL CV CATH: CPT

## 2018-05-16 PROCEDURE — 93308 TTE F-UP OR LMTD: CPT | Mod: 26

## 2018-05-16 PROCEDURE — 93503 INSERT/PLACE HEART CATHETER: CPT

## 2018-05-16 RX ORDER — POLYETHYLENE GLYCOL 3350 17 G/17G
17 POWDER, FOR SOLUTION ORAL
Qty: 0 | Refills: 0 | Status: DISCONTINUED | OUTPATIENT
Start: 2018-05-16 | End: 2018-05-20

## 2018-05-16 RX ORDER — SODIUM CHLORIDE 9 MG/ML
250 INJECTION INTRAMUSCULAR; INTRAVENOUS; SUBCUTANEOUS ONCE
Qty: 0 | Refills: 0 | Status: COMPLETED | OUTPATIENT
Start: 2018-05-16 | End: 2018-05-16

## 2018-05-16 RX ORDER — METOCLOPRAMIDE HCL 10 MG
10 TABLET ORAL ONCE
Qty: 0 | Refills: 0 | Status: COMPLETED | OUTPATIENT
Start: 2018-05-16 | End: 2018-05-16

## 2018-05-16 RX ORDER — MORPHINE SULFATE 50 MG/1
5 CAPSULE, EXTENDED RELEASE ORAL ONCE
Qty: 0 | Refills: 0 | Status: DISCONTINUED | OUTPATIENT
Start: 2018-05-16 | End: 2018-05-16

## 2018-05-16 RX ORDER — ONDANSETRON 8 MG/1
4 TABLET, FILM COATED ORAL ONCE
Qty: 0 | Refills: 0 | Status: DISCONTINUED | OUTPATIENT
Start: 2018-05-16 | End: 2018-05-16

## 2018-05-16 RX ORDER — MORPHINE SULFATE 50 MG/1
0.5 CAPSULE, EXTENDED RELEASE ORAL ONCE
Qty: 0 | Refills: 0 | Status: DISCONTINUED | OUTPATIENT
Start: 2018-05-16 | End: 2018-05-16

## 2018-05-16 RX ORDER — PHENYLEPHRINE HYDROCHLORIDE 10 MG/ML
0.1 INJECTION INTRAVENOUS
Qty: 40 | Refills: 0 | Status: DISCONTINUED | OUTPATIENT
Start: 2018-05-16 | End: 2018-05-22

## 2018-05-16 RX ORDER — SODIUM CHLORIDE 9 MG/ML
500 INJECTION INTRAMUSCULAR; INTRAVENOUS; SUBCUTANEOUS ONCE
Qty: 0 | Refills: 0 | Status: DISCONTINUED | OUTPATIENT
Start: 2018-05-16 | End: 2018-05-16

## 2018-05-16 RX ORDER — ONDANSETRON 8 MG/1
4 TABLET, FILM COATED ORAL ONCE
Qty: 0 | Refills: 0 | Status: COMPLETED | OUTPATIENT
Start: 2018-05-16 | End: 2018-05-16

## 2018-05-16 RX ORDER — MIDAZOLAM HYDROCHLORIDE 1 MG/ML
1 INJECTION, SOLUTION INTRAMUSCULAR; INTRAVENOUS ONCE
Qty: 0 | Refills: 0 | Status: DISCONTINUED | OUTPATIENT
Start: 2018-05-16 | End: 2018-05-16

## 2018-05-16 RX ADMIN — PHENYLEPHRINE HYDROCHLORIDE 3.64 MICROGRAM(S)/KG/MIN: 10 INJECTION INTRAVENOUS at 06:20

## 2018-05-16 RX ADMIN — ONDANSETRON 4 MILLIGRAM(S): 8 TABLET, FILM COATED ORAL at 10:18

## 2018-05-16 RX ADMIN — MIDODRINE HYDROCHLORIDE 20 MILLIGRAM(S): 2.5 TABLET ORAL at 05:58

## 2018-05-16 RX ADMIN — Medication 500 MICROGRAM(S): at 21:18

## 2018-05-16 RX ADMIN — MIDODRINE HYDROCHLORIDE 20 MILLIGRAM(S): 2.5 TABLET ORAL at 16:07

## 2018-05-16 RX ADMIN — EPOPROSTENOL 4.66 NANOGRAM(S)/KG/MIN: 0.5 INJECTION, POWDER, LYOPHILIZED, FOR SOLUTION INTRAVENOUS at 02:43

## 2018-05-16 RX ADMIN — HEPARIN SODIUM 10 UNIT(S)/HR: 5000 INJECTION INTRAVENOUS; SUBCUTANEOUS at 17:43

## 2018-05-16 RX ADMIN — Medication 100 MILLIGRAM(S): at 05:58

## 2018-05-16 RX ADMIN — Medication 1 TABLET(S): at 09:01

## 2018-05-16 RX ADMIN — Medication 100 MILLIGRAM(S): at 16:07

## 2018-05-16 RX ADMIN — SODIUM CHLORIDE 1000 MILLILITER(S): 9 INJECTION INTRAMUSCULAR; INTRAVENOUS; SUBCUTANEOUS at 23:45

## 2018-05-16 RX ADMIN — Medication 100 MILLIGRAM(S): at 21:39

## 2018-05-16 RX ADMIN — MORPHINE SULFATE 0.5 MILLIGRAM(S): 50 CAPSULE, EXTENDED RELEASE ORAL at 14:16

## 2018-05-16 RX ADMIN — Medication 500 MICROGRAM(S): at 00:44

## 2018-05-16 RX ADMIN — SPIRONOLACTONE 25 MILLIGRAM(S): 25 TABLET, FILM COATED ORAL at 05:59

## 2018-05-16 RX ADMIN — Medication 650 MILLIGRAM(S): at 01:29

## 2018-05-16 RX ADMIN — EPOPROSTENOL 4.66 NANOGRAM(S)/KG/MIN: 0.5 INJECTION, POWDER, LYOPHILIZED, FOR SOLUTION INTRAVENOUS at 10:45

## 2018-05-16 RX ADMIN — MORPHINE SULFATE 0.5 MILLIGRAM(S): 50 CAPSULE, EXTENDED RELEASE ORAL at 14:35

## 2018-05-16 RX ADMIN — MIDODRINE HYDROCHLORIDE 20 MILLIGRAM(S): 2.5 TABLET ORAL at 21:39

## 2018-05-16 RX ADMIN — MIDAZOLAM HYDROCHLORIDE 1 MILLIGRAM(S): 1 INJECTION, SOLUTION INTRAMUSCULAR; INTRAVENOUS at 14:15

## 2018-05-16 RX ADMIN — SODIUM CHLORIDE 250 MILLILITER(S): 9 INJECTION INTRAMUSCULAR; INTRAVENOUS; SUBCUTANEOUS at 18:00

## 2018-05-16 RX ADMIN — PHENYLEPHRINE HYDROCHLORIDE 3.64 MICROGRAM(S)/KG/MIN: 10 INJECTION INTRAVENOUS at 10:15

## 2018-05-16 RX ADMIN — SODIUM CHLORIDE 250 MILLILITER(S): 9 INJECTION INTRAMUSCULAR; INTRAVENOUS; SUBCUTANEOUS at 19:00

## 2018-05-16 RX ADMIN — SENNA PLUS 2 TABLET(S): 8.6 TABLET ORAL at 21:39

## 2018-05-16 RX ADMIN — Medication 650 MILLIGRAM(S): at 02:00

## 2018-05-16 RX ADMIN — PANTOPRAZOLE SODIUM 40 MILLIGRAM(S): 20 TABLET, DELAYED RELEASE ORAL at 09:02

## 2018-05-16 RX ADMIN — Medication 10 MILLIGRAM(S): at 03:17

## 2018-05-16 NOTE — CONSULT NOTE ADULT - PROBLEM SELECTOR RECOMMENDATION 9
I would volume load her gradually to a CVP of approximately 10 mmHg with close monitoring of hemodynamic response. If possible, wean the phenylephrine and midodrine with goal MAP > 65 mmHg.

## 2018-05-16 NOTE — PROCEDURE NOTE - NSPOSTCAREGUIDE_GEN_A_CORE
Care for catheter as per unit/ICU protocols/Verbal/written post procedure instructions were given to patient/caregiver
Verbal/written post procedure instructions were given to patient/caregiver/Instructed patient/caregiver to follow-up with primary care physician/Care for catheter as per unit/ICU protocols

## 2018-05-16 NOTE — CONSULT NOTE ADULT - PROBLEM SELECTOR RECOMMENDATION 2
When she is adequately volume loaded, I would begin uptitration of epoprostenol. If she has any evidence of worsening hypoxia or further vasodilation, we will need to reassess.

## 2018-05-16 NOTE — PROGRESS NOTE ADULT - ASSESSMENT
Patient is a 37 year-old female admitted to the MICU to institute Flolan therapy for baseline pulmonary HTN, patient subsequently developed hypotension 2/2 medication and was started on vasopressors. Patient at this time, followed by pulmonary team, goal is to take off vasopressor support and maintain prostacyclin, with improvement of function, CO and pulmonary compliance. Currently managed with heparin gtt for chronic PE. Will place swan nick catheter to measure pressures directly to assess for treatment effectiveness.     Neuro- no active issues  -monitor MS  -OOB, PT    Skin- right IJ in place for vasopressors, clean dressing  -will place peripheral IV for pressor use.    GI- constipation  -start bowel regimen, add lactulose, miralax  -hx of GERD on pantoprazole 40mg daily  -regular diet    Metabolic/renal- no active issues    Heme-   -on heparin gtt for hx of chronic PE, currently being held for procedure, will re-start after procedure and re-measure PTT q6h per protocol  -monitor for bleeding  -hx of JULIA on ferrous sulfate, MV    ID- no active issues, increasing WBC with no evidence of infection.   -monitor Tmax and WBC    CV: Hx Pulm Htn diagnosed in 2014, right heart cath on 5/5 with elevated right pressures; swan nick catheterization at bedside to evaluate for PA pressures.   -continue with epoprostenol gtt, titrates as tolerated  -continue riociguat   -hypotension 2/2 medication, continue to titrate levophed  -midodrine 20mg TID    Pulm: Asthma / Pulm Htn/ PE (hx)  - continue with pulm HTN treatment as above  -monitor O2 sat, O2 supplementation via NC at nighttime    Rylie Salter PGY2 Patient is a 37 year-old female admitted to the MICU to institute Flolan therapy for baseline pulmonary HTN, patient subsequently developed hypotension 2/2 medication and was started on vasopressors. Patient at this time, followed by pulmonary team, goal is to take off vasopressor support and maintain prostacyclin, with improvement of function, CO and pulmonary compliance. Currently managed with heparin gtt for chronic PE. Will place swan nick catheter to measure pressures directly to assess for treatment effectiveness.     #Neuro: no issues  -monitor MS  -OOB, PT    Skin- right IJ in place for vasopressors, clean dressing  -S/p R. femoral triple lumen  -No acute issues    #GI  -Added lactulose, miralax, to titrate to bowel movements daily  -C/w pantoprazole 40mg daily  -Regular diet    #Renal: no active issues  -Trend I's+O's, S-Cr    #Heme-   -on heparin gtt for hx of chronic PE, continue per normogram  -monitor for bleeding  -hx of JULIA on ferrous sulfate, MV    #ID- no active issues, increasing WBC with no evidence of infection.   -monitor Tmax and WBC    #CV: Hx Pulm Htn diagnosed in 2014, right heart cath on 5/5 with elevated right pressures; swan nick catheterization at bedside to evaluate for PA pressures.   -continue with epoprostenol gtt, titrates as tolerated  -continue riociguat   -hypotension 2/2 medication, continue to titrate phenylephrine  -Continue with midodrine 20mg TID  -Due for RHC today    #Pulm: Asthma / Pulm Htn/ PE (hx)  - continue with pulm HTN treatment as above  -monitor O2 sat, O2 supplementation via NC at nighttime Patient is a 37 year-old female admitted to the MICU to institute Flolan therapy for baseline pulmonary HTN, patient subsequently developed hypotension 2/2 medication and was started on vasopressors. Patient at this time, followed by pulmonary team, goal is to take off vasopressor support and maintain prostacyclin, with improvement of function, CO and pulmonary compliance. Currently managed with heparin gtt for chronic PE. Will place swan cathy catheter to measure pressures directly to assess for treatment effectiveness.     #Neuro: no issues  -monitor MS  -OOB, PT    #Skin- right IJ in place for vasopressors, clean dressing  -S/p R. femoral triple lumen for additional access  -WIll convert R. IJV access to [place]athy catheter for detailed hemodynamic monitoring  -No acute issues    #GI  -Added lactulose, miralax, to titrate to bowel movements daily  -C/w pantoprazole 40mg daily  -Regular diet    #Renal: no active issues  -Trend I's+O's, S-Cr    #Heme-   -on heparin gtt for hx of chronic PE, continue per normogram  -monitor for bleeding  -hx of JULIA on ferrous sulfate, MV    #ID- no active issues, increasing WBC with no evidence of infection.   -Monitor Tmax and WBC    #CV: Hx Pulm Htn diagnosed in 2014, right heart cath on 5/5 with elevated right pressures; swan cathy catheterization at bedside to evaluate for PA pressures.   -continue with epoprostenol gtt, titrates as tolerated  -hypotension 2/2 medication, continue to titrate phenylephrine to maintain  -Continue with midodrine 20mg TID  -Monitor hemodynamics    #Pulm: Asthma / Pulm Htn/ PE (hx)  - continue with pulm HTN treatment as above  -monitor O2 sat, O2 supplementation via NC at nighttime  -On heparin gtt for known VTE    #VTE PPx:  -On heparin drip

## 2018-05-16 NOTE — PROGRESS NOTE ADULT - SUBJECTIVE AND OBJECTIVE BOX
CHIEF COMPLAINT:    Interval Events:    REVIEW OF SYSTEMS:  Constitutional: [ ] negative [ ] fevers [ ] chills [ ] weight loss [ ] weight gain  HEENT: [ ] negative [ ] dry eyes [ ] eye irritation [ ] postnasal drip [ ] nasal congestion  CV: [ ] negative  [ ] chest pain [ ] orthopnea [ ] palpitations [ ] murmur  Resp: [ ] negative [ ] cough [ ] shortness of breath [ ] dyspnea [ ] wheezing [ ] sputum [ ] hemoptysis  GI: [ ] negative [ ] nausea [ ] vomiting [ ] diarrhea [ ] constipation [ ] abd pain [ ] dysphagia   : [ ] negative [ ] dysuria [ ] nocturia [ ] hematuria [ ] increased urinary frequency  Musculoskeletal: [ ] negative [ ] back pain [ ] myalgias [ ] arthralgias [ ] fracture  Skin: [ ] negative [ ] rash [ ] itch  Neurological: [ ] negative [ ] headache [ ] dizziness [ ] syncope [ ] weakness [ ] numbness  Psychiatric: [ ] negative [ ] anxiety [ ] depression  Endocrine: [ ] negative [ ] diabetes [ ] thyroid problem  Hematologic/Lymphatic: [ ] negative [ ] anemia [ ] bleeding problem  Allergic/Immunologic: [ ] negative [ ] itchy eyes [ ] nasal discharge [ ] hives [ ] angioedema  [ ] All other systems negative  [ ] Unable to assess ROS because ________    OBJECTIVE:  ICU Vital Signs Last 24 Hrs  T(C): 36.9 (16 May 2018 04:00), Max: 37 (15 May 2018 13:30)  T(F): 98.5 (16 May 2018 04:00), Max: 98.6 (15 May 2018 13:30)  HR: 141 (16 May 2018 06:00) (107 - 148)  BP: 98/53 (16 May 2018 06:00) (79/51 - 120/69)  BP(mean): 74 (16 May 2018 06:00) (57 - 89)  ABP: --  ABP(mean): --  RR: 20 (16 May 2018 06:00) (8 - 31)  SpO2: 97% (16 May 2018 06:00) (94% - 100%)        05-14 @ 07:01  -  05-15 @ 07:00  --------------------------------------------------------  IN: 1240.6 mL / OUT: 1990 mL / NET: -749.4 mL    05-15 @ 07:01  - 05-16 @ 06:58  --------------------------------------------------------  IN: 596.1 mL / OUT: 1200 mL / NET: -603.9 mL      CAPILLARY BLOOD GLUCOSE          PHYSICAL EXAM:  General:   HEENT:   Lymph Nodes:  Neck:   Respiratory:   Cardiovascular:   Abdomen:   Extremities:   Skin:   Neurological:  Psychiatry:    LINES:    HOSPITAL MEDICATIONS:  Standing Meds:  benzonatate 100 milliGRAM(s) Oral three times a day  docusate sodium 100 milliGRAM(s) Oral two times a day  epoprostenol Infusion 4 NANOGram(s)/kG/Min IV Continuous <Continuous>  ferrous    sulfate 325 milliGRAM(s) Oral daily  heparin  Infusion 1200 Unit(s)/Hr IV Continuous <Continuous>  ipratropium    for Nebulization 500 MICROGram(s) Nebulizer every 6 hours  midodrine 20 milliGRAM(s) Oral every 8 hours  multivitamin 1 Tablet(s) Oral daily  norepinephrine Infusion 0.05 MICROgram(s)/kG/Min IV Continuous <Continuous>  pantoprazole    Tablet 40 milliGRAM(s) Oral before breakfast  phenylephrine    Infusion 0.1 MICROgram(s)/kG/Min IV Continuous <Continuous>  polyethylene glycol 3350 17 Gram(s) Oral two times a day  Ri[place] ([place]empas) 2 milliGRAM(s) 2 milliGRAM(s) Oral three times a day  senna 2 Tablet(s) Oral at bedtime  spironolactone 25 milliGRAM(s) Oral daily      PRN Meds:  acetaminophen   Tablet. 650 milliGRAM(s) Oral every 6 hours PRN  guaiFENesin    Syrup 100 milliGRAM(s) Oral every 6 hours PRN      LABS:                        11.5   14.2  )-----------( 318      ( 15 May 2018 22:57 )             35.6     Hgb Trend: 11.5<--, 11.6<--, 10.7<--, 10.6<--, 10.8<--  05-15    134<L>  |  96  |  10  ----------------------------<  98  3.6   |  19<L>  |  1.01    Ca    9.4      15 May 2018 22:57  Phos  4.2     05-15  Mg     1.7     05-15    TPro  7.6  /  Alb  4.3  /  TBili  0.8  /  DBili  x   /  AST  13  /  ALT  14  /  AlkPhos  41  05-15    Creatinine Trend: 1.01<--, 0.95<--, 0.94<--, 1.05<--, 0.98<--, 1.25<--  PT/INR - ( 15 May 2018 22:57 )   PT: 12.5 sec;   INR: 1.15 ratio         PTT - ( 15 May 2018 22:57 )  PTT:33.2 sec      Venous Blood Gas:  05-16 @ 00:27  7.45/33/32/22/54  VBG Lactate: 1.5      MICROBIOLOGY:     RADIOLOGY:  [ ] Reviewed and interpreted by me    EKG: CHIEF COMPLAINT:    Interval Events:  36 y/o F with hx of Pulmonary arterial hypertension who was admitted on 5/1 to medicine for SOB, unable to control her symptoms, transitioned to prostacyclin via IV and became hypotensive requiring vasopressors.   Overnight complaining of dizziness and palpitations. This AM constipation and heartburn.     REVIEW OF SYSTEMS:  Constitutional: [x ] negative [ ] fevers [ ] chills [ ] weight loss [ ] weight gain  HEENT: [x ] negative [ ] dry eyes [ ] eye irritation [ ] postnasal drip [ ] nasal congestion  CV: [ ] negative  [ ] chest pain [ ] orthopnea [x ] palpitations [ ] murmur  Resp: [ ] negative [ ] cough [ x] shortness of breath [ ] dyspnea [ ] wheezing [ ] sputum [ ] hemoptysis  GI: [ x] negative [ ] nausea [ ] vomiting [ ] diarrhea [ ] constipation [ ] abd pain [ ] dysphagia   : [x ] negative [ ] dysuria [ ] nocturia [ ] hematuria [ ] increased urinary frequency  Musculoskeletal: x[ ] negative [ ] back pain [ ] myalgias [ ] arthralgias [ ] fracture  Skin: [x ] negative [ ] rash [ ] itch  Neurological: [ x] negative [ ] headache [ ] dizziness [ ] syncope [ ] weakness [ ] numbness  Psychiatric: [x ] negative [ ] anxiety [ ] depression  Endocrine: [ x] negative [ ] diabetes [ ] thyroid problem  Hematologic/Lymphatic: [x ] negative [ ] anemia [ ] bleeding problem  Allergic/Immunologic: [x ] negative [ ] itchy eyes [ ] nasal discharge [ ] hives [ ] angioedema  [ ] All other systems negative  [ ] Unable to assess ROS because ________    OBJECTIVE:  ICU Vital Signs Last 24 Hrs  T(C): 36.9 (16 May 2018 04:00), Max: 37 (15 May 2018 13:30)  T(F): 98.5 (16 May 2018 04:00), Max: 98.6 (15 May 2018 13:30)  HR: 141 (16 May 2018 06:00) (107 - 148)  BP: 98/53 (16 May 2018 06:00) (79/51 - 120/69)  BP(mean): 74 (16 May 2018 06:00) (57 - 89)  ABP: --  ABP(mean): --  RR: 20 (16 May 2018 06:00) (8 - 31)  SpO2: 97% (16 May 2018 06:00) (94% - 100%)        05-14 @ 07:01  -  05-15 @ 07:00  --------------------------------------------------------  IN: 1240.6 mL / OUT: 1990 mL / NET: -749.4 mL    05-15 @ 07:01 - 05-16 @ 06:58  --------------------------------------------------------  IN: 596.1 mL / OUT: 1200 mL / NET: -603.9 mL      CAPILLARY BLOOD GLUCOSE          PHYSICAL EXAM:  General: Obese, comfortably lying in bed   HEENT: EOMI, PERRLA  Lymph Nodes: no cervical or calvicular adenopathy  Neck: no thyromegaly , midline trachea  Respiratory: CTA b/l, no wheezing  Cardiovascular: +S1S2, tachycardic  Abdomen: soft, NTND, bowel sounds present  Extremities: no edema, SHO  Skin: R. IJV site and R femoral line site clear without erythema  Neurological: AAO x 3, no focal deficits  Psychiatry: Appropriate mood and affect    LINES:    HOSPITAL MEDICATIONS:  Standing Meds:  benzonatate 100 milliGRAM(s) Oral three times a day  docusate sodium 100 milliGRAM(s) Oral two times a day  epoprostenol Infusion 4 NANOGram(s)/kG/Min IV Continuous <Continuous>  ferrous    sulfate 325 milliGRAM(s) Oral daily  heparin  Infusion 1200 Unit(s)/Hr IV Continuous <Continuous>  ipratropium    for Nebulization 500 MICROGram(s) Nebulizer every 6 hours  midodrine 20 milliGRAM(s) Oral every 8 hours  multivitamin 1 Tablet(s) Oral daily  norepinephrine Infusion 0.05 MICROgram(s)/kG/Min IV Continuous <Continuous>  pantoprazole    Tablet 40 milliGRAM(s) Oral before breakfast  phenylephrine    Infusion 0.1 MICROgram(s)/kG/Min IV Continuous <Continuous>  polyethylene glycol 3350 17 Gram(s) Oral two times a day  Ri[place] ([place]) 2 milliGRAM(s) 2 milliGRAM(s) Oral three times a day  senna 2 Tablet(s) Oral at bedtime  spironolactone 25 milliGRAM(s) Oral daily      PRN Meds:  acetaminophen   Tablet. 650 milliGRAM(s) Oral every 6 hours PRN  guaiFENesin    Syrup 100 milliGRAM(s) Oral every 6 hours PRN      LABS:                        11.5   14.2  )-----------( 318      ( 15 May 2018 22:57 )             35.6     Hgb Trend: 11.5<--, 11.6<--, 10.7<--, 10.6<--, 10.8<--  05-15    134<L>  |  96  |  10  ----------------------------<  98  3.6   |  19<L>  |  1.01    Ca    9.4      15 May 2018 22:57  Phos  4.2     05-15  Mg     1.7     05-15    TPro  7.6  /  Alb  4.3  /  TBili  0.8  /  DBili  x   /  AST  13  /  ALT  14  /  AlkPhos  41  05-15    Creatinine Trend: 1.01<--, 0.95<--, 0.94<--, 1.05<--, 0.98<--, 1.25<--  PT/INR - ( 15 May 2018 22:57 )   PT: 12.5 sec;   INR: 1.15 ratio         PTT - ( 15 May 2018 22:57 )  PTT:33.2 sec      Venous Blood Gas:  05-16 @ 00:27  7.45/33/32/22/54  VBG Lactate: 1.5      MICROBIOLOGY:     RADIOLOGY:  [ ] Reviewed and interpreted by me    EKG: CHIEF COMPLAINT:  36 y/o F with hx of Pulmonary arterial hypertension who was admitted on 5/1 to medicine for SOB, unable to control her symptoms, transitioned to prostacyclin via IV and became hypotensive requiring vasopressors.   Overnight complaining of dizziness and palpitations. This AM constipation and heartburn.     Interval Events: No major events overnight     REVIEW OF SYSTEMS:  Constitutional: [x ] negative [ ] fevers [ ] chills [ ] weight loss [ ] weight gain  HEENT: [x ] negative [ ] dry eyes [ ] eye irritation [ ] postnasal drip [ ] nasal congestion  CV: [ ] negative  [ ] chest pain [ ] orthopnea [x ] palpitations [ ] murmur  Resp: [ ] negative [ ] cough [ x] shortness of breath [ ] dyspnea [ ] wheezing [ ] sputum [ ] hemoptysis  GI: [ x] negative [ ] nausea [ ] vomiting [ ] diarrhea [ ] constipation [ ] abd pain [ ] dysphagia   : [x ] negative [ ] dysuria [ ] nocturia [ ] hematuria [ ] increased urinary frequency  Musculoskeletal: [x ] negative [ ] back pain [ ] myalgias [ ] arthralgias [ ] fracture  Skin: [x ] negative [ ] rash [ ] itch  Neurological: [ x] negative [ ] headache [ ] dizziness [ ] syncope [ ] weakness [ ] numbness  Psychiatric: [x ] negative [ ] anxiety [ ] depression  Endocrine: [ x] negative [ ] diabetes [ ] thyroid problem  Hematologic/Lymphatic: [x ] negative [ ] anemia [ ] bleeding problem  Allergic/Immunologic: [x ] negative [ ] itchy eyes [ ] nasal discharge [ ] hives [ ] angioedema  [ ] All other systems negative  [ ] Unable to assess ROS because ________    OBJECTIVE:  ICU Vital Signs Last 24 Hrs  T(C): 36.9 (16 May 2018 04:00), Max: 37 (15 May 2018 13:30)  T(F): 98.5 (16 May 2018 04:00), Max: 98.6 (15 May 2018 13:30)  HR: 141 (16 May 2018 06:00) (107 - 148)  BP: 98/53 (16 May 2018 06:00) (79/51 - 120/69)  BP(mean): 74 (16 May 2018 06:00) (57 - 89)  ABP: --  ABP(mean): --  RR: 20 (16 May 2018 06:00) (8 - 31)  SpO2: 97% (16 May 2018 06:00) (94% - 100%)        05-14 @ 07:01  -  05-15 @ 07:00  --------------------------------------------------------  IN: 1240.6 mL / OUT: 1990 mL / NET: -749.4 mL    05-15 @ 07:01 - 05-16 @ 06:58  --------------------------------------------------------  IN: 596.1 mL / OUT: 1200 mL / NET: -603.9 mL      CAPILLARY BLOOD GLUCOSE          PHYSICAL EXAM:  General: Obese, comfortably lying in bed   HEENT: EOMI, PERRLA  Lymph Nodes: no cervical or calvicular adenopathy  Neck: no thyromegaly , midline trachea  Respiratory: CTA b/l, no wheezing  Cardiovascular: +S1S2, tachycardic  Abdomen: soft, NTND, bowel sounds present  Extremities: no edema, SHO  Skin: R. IJV site and R femoral line site clear without erythema  Neurological: AAO x 3, no focal deficits  Psychiatry: Appropriate mood and affect    LINES:    HOSPITAL MEDICATIONS:  Standing Meds:  benzonatate 100 milliGRAM(s) Oral three times a day  docusate sodium 100 milliGRAM(s) Oral two times a day  epoprostenol Infusion 4 NANOGram(s)/kG/Min IV Continuous <Continuous>  ferrous    sulfate 325 milliGRAM(s) Oral daily  heparin  Infusion 1200 Unit(s)/Hr IV Continuous <Continuous>  ipratropium    for Nebulization 500 MICROGram(s) Nebulizer every 6 hours  midodrine 20 milliGRAM(s) Oral every 8 hours  multivitamin 1 Tablet(s) Oral daily  norepinephrine Infusion 0.05 MICROgram(s)/kG/Min IV Continuous <Continuous>  pantoprazole    Tablet 40 milliGRAM(s) Oral before breakfast  phenylephrine    Infusion 0.1 MICROgram(s)/kG/Min IV Continuous <Continuous>  polyethylene glycol 3350 17 Gram(s) Oral two times a day  Ri[place] ([place]empas) 2 milliGRAM(s) 2 milliGRAM(s) Oral three times a day  senna 2 Tablet(s) Oral at bedtime  spironolactone 25 milliGRAM(s) Oral daily      PRN Meds:  acetaminophen   Tablet. 650 milliGRAM(s) Oral every 6 hours PRN  guaiFENesin    Syrup 100 milliGRAM(s) Oral every 6 hours PRN      LABS:                        11.5   14.2  )-----------( 318      ( 15 May 2018 22:57 )             35.6     Hgb Trend: 11.5<--, 11.6<--, 10.7<--, 10.6<--, 10.8<--  05-15    134<L>  |  96  |  10  ----------------------------<  98  3.6   |  19<L>  |  1.01    Ca    9.4      15 May 2018 22:57  Phos  4.2     05-15  Mg     1.7     05-15    TPro  7.6  /  Alb  4.3  /  TBili  0.8  /  DBili  x   /  AST  13  /  ALT  14  /  AlkPhos  41  05-15    Creatinine Trend: 1.01<--, 0.95<--, 0.94<--, 1.05<--, 0.98<--, 1.25<--  PT/INR - ( 15 May 2018 22:57 )   PT: 12.5 sec;   INR: 1.15 ratio         PTT - ( 15 May 2018 22:57 )  PTT:33.2 sec      Venous Blood Gas:  05-16 @ 00:27  7.45/33/32/22/54  VBG Lactate: 1.5      MICROBIOLOGY:     RADIOLOGY:  [ ] Reviewed and interpreted by me    EKG:

## 2018-05-16 NOTE — CONSULT NOTE ADULT - ASSESSMENT
Ms. Conway is a 37 year old woman with WHO group I PAH. She currently is on vasopressor support due to persistent hypotension, but has no evidence of low cardiac output on exam nor is she volume overloaded. Her CVP is not elevated. Ms. Conway is a 37 year old woman with WHO group I PAH with chronic RV systolic heart failure. She presented with syncope and symptomatic tachycardia with hypotension. She currently is on vasopressor support due to persistent hypotension, but has no evidence of low cardiac output on exam nor is she volume overloaded. Her CVP is not elevated. Given her physical exam, hemodynamic and echocardiographic findings, I believe that she is relatively volume deplete.     >60 minutes of critical care time delivered monitoring hemodynamics and response to IV vasopressors and fluid infusion. Case discussed in multidisciplinary fashion with Dr. Yoon and Dr. Reagan.     Please call me with questions at [phone].

## 2018-05-16 NOTE — CONSULT NOTE ADULT - SUBJECTIVE AND OBJECTIVE BOX
Ms. Conway is a 37 year old woman with idiopathic pulmonary arterial hypertension which was diagnosed in  at which time she presented with one year of progressive shortness of breath and volume overload. She was initially diuresed and started on pulmonary vasodilator therapy with riociguat. Unfortunately she had a hard time coming to  with her illness and did not take her medications with sufficient frequency. At some point she was started on therapy with nifedipine, but also did not take this with any regularity. She notes that over the last few years she has been severely limited. She does not leave the house due to her shortness of breath. She has frequent palpitations. She has had three episodes of syncope, the first in  and the last right before admission when taking a shower.     Since being in the hospital, she has required IV vasopressors due to hypotension and has been on increasing doses of midodrine.     Past medical history:  WHO Group I PAH, idiopathic  History of pulmonary embolism  Chronic hypoxic respiratory failure on home oxygen  GERD    Allergies    No Known Allergies    MEDICATIONS  (STANDING):  benzonatate 100 milliGRAM(s) Oral three times a day  docusate sodium 100 milliGRAM(s) Oral two times a day  epoprostenol Infusion 4 NANOGram(s)/kG/Min (4.656 mL/Hr) IV Continuous <Continuous>  ferrous    sulfate 325 milliGRAM(s) Oral daily  heparin  Infusion 1200 Unit(s)/Hr (10 mL/Hr) IV Continuous <Continuous>  ipratropium    for Nebulization 500 MICROGram(s) Nebulizer every 6 hours  midodrine 20 milliGRAM(s) Oral every 8 hours  multivitamin 1 Tablet(s) Oral daily  norepinephrine Infusion 0.05 MICROgram(s)/kG/Min (9.094 mL/Hr) IV Continuous <Continuous>  pantoprazole    Tablet 40 milliGRAM(s) Oral before breakfast  phenylephrine    Infusion 0.1 MICROgram(s)/kG/Min (3.638 mL/Hr) IV Continuous <Continuous>  polyethylene glycol 3350 17 Gram(s) Oral two times a day  Riociguat (Adempas) 2 milliGRAM(s) 2 milliGRAM(s) Oral three times a day  senna 2 Tablet(s) Oral at bedtime  spironolactone 25 milliGRAM(s) Oral daily    MEDICATIONS  (PRN):  acetaminophen   Tablet. 650 milliGRAM(s) Oral every 6 hours PRN mild to moderate pain  guaiFENesin    Syrup 100 milliGRAM(s) Oral every 6 hours PRN Cough    Physical Exam:     ICU Vital Signs Last 24 Hrs  T(C): 36.9 (16 May 2018 11:00), Max: 36.9 (16 May 2018 04:00)  T(F): 98.5 (16 May 2018 11:00), Max: 98.5 (16 May 2018 04:00)  HR: 95 (16 May 2018 16:42) (87 - 148)  BP: 85/52 (16 May 2018 16:15) (79/51 - 120/69)  BP(mean): 64 (16 May 2018 16:15) (57 - 89)  RR: 20 (16 May 2018 16:15) (8 - 32)  SpO2: 100% (16 May 2018 16:42) (91% - 100%)    General: No distress. Slightly uncomfortable due to PA catheter in neck.  HEENT: EOM intact.  Neck: Neck supple. JVP not elevated. No masses  Chest: Clear to auscultation bilaterally  CV: Regular rate and rhythm with normal S1 and loud P2 across the precordium. There is a harsh II/VI systolic murmur heard over the PA. No gallops. Normal pulses. No edema.   Abdomen: Soft, non-distended, non-tender  Skin: No rashes or skin breakdown  Neurology: Alert and oriented times three. Sensation intact  Psych: Affect normal                          11.5   14.2  )-----------( 318      ( 15 May 2018 22:57 )             35.6     05-15    134<L>  |  96  |  10  ----------------------------<  98  3.6   |  19<L>  |  1.01    Ca    9.4      15 May 2018 22:57  Phos  4.2     05-15  Mg     1.7     05-15    TPro  7.6  /  Alb  4.3  /  TBili  0.8  /  DBili  x   /  AST  13  /  ALT  14  /  AlkPhos  41  05-15    EKG: I reviewed her EKG, showing RAD with right ventricular hypertrophy in normal sinus rhythm.    < from: Transthoracic Echocardiogram (18 @ 16:06) >    Patient name: ROXI CONWAY  YOB: 1980   Age: 37 (F)   MR#: [id number]  Study Date: 2018  Location: [address] [place], [place] [id number]: [facility]  Study quality: Technically fair  Referring Physician: Girish Pierre MD  Blood Pressure: 98/52 mmHg  Height: 168 cm  Weight: 97 kg  BSA: 2.1 m2  ------------------------------------------------------------------------  PROCEDURE: Transthoracic echocardiogram with 2-D, M-Mode  and complete spectral and color flow Doppler.  INDICATION: Dyspnea, unspecified (R06.00)  ------------------------------------------------------------------------  Dimensions:    Normal Values:  LA:     2.8    2.0 - 4.0 cm  Ao:     3.3    2.0 - 3.8 cm  SEPTUM: 0.9    0.6 - 1.2 cm  PWT:    0.8    0.6 - 1.1 cm  LVIDd:  3.4    3.0 - 5.6 cm  LVIDs:  1.6    1.8 - 4.0 cm  Derived variables:  LVMI: 38 g/m2  RWT: 0.47  EF (Visual Estimate): 70 %  Doppler Peak Velocity (m/sec): AoV=1.0 PV=1.3  ------------------------------------------------------------------------  Observations:  Mitral Valve: Normal mitral valve structure. Mild mitral  regurgitation.  Aortic Valve/Aorta: Tricuspid aortic valve with normal cusp  excursion. Peak transaortic valve gradient equals 4 mm Hg.  No aortic valve regurgitation seen.Peak left ventricular  outflow tract gradient equals 3 mm Hg.  Aortic Root (SoV): 3.3 cm.  Left Atrium: Normal left atrium.  Left Ventricle: Not all wall segments were well visualized.   Normal overall left ventricular systolic function with an  estimated ejection fraction of 70%. Left ventricular cavity  size appears small (left ventricular internal diastolic  dimension 3.4 cm). Normal diastolic function.  Right Heart: Right atrium not well visualized. Right  ventricular enlargement with decreased right ventricular  systolic function.  Flattening of the interventricular  septum in systole consistent with right ventricular  pressure overload. Normal tricuspid valve structure.  Minimal tricuspid regurgitation. No pulmonic stenosis. Mild  pulmonic regurgitation.  Pericardium/Pleura: No pericardial effusion seen.  Hemodynamic: Inadequate tricuspid regurgitation Doppler  envelope precludes estimation of RVSP.  ------------------------------------------------------------------------  Conclusions:  Suboptimal image quality.  1. Left ventricular cavity size appears small (left  ventricular internal diastolic dimension 3.4 cm).  2. Not all wall segments were well visualized.  Normal  overall left ventricular systolic function with an  estimated ejection fraction of 70%.  3. Normal diastolic function.  4. Right ventricular enlargement with decreased right  ventricular systolic function.  Flattening of the  interventricular septum in systole consistent with right  ventricular pressure overload.  5. Inadequate tricuspid regurgitation Doppler envelope  precludes estimation of RVSP.  ------------------------------------------------------------------------  Confirmed on  2018 - 18:24:17 by Tavares Woo M.D.  ------------------------------------------------------------------------    < end of copied text >      < from: Cardiac Cath Lab - Adult (18 @ 15:14) >    [facility]  Department of Cardiology  [address] [place] [address]  [phone]  Cath Lab Report -- Comprehensive Report  Patient: ROXI CONWAY  Study date: 2018  Account number: [id number]  MR number:[id number]  : 1980  Gender: Female  Race: O  Case Physician(s):  Norma Stringer M.D.  Referring Physician:  Girish Pierre M.D.  INDICATIONS: Primary pulmonary hypertension.  HISTORY: History includes pulmonary hypertension. The patient has  hypertension, medication-treated dyslipidemia, and a family history of  coronary artery disease.  PROCEDURE:  --  Right heart catheterization.  --  Sonosite - Diagnostic.  TECHNIQUE: The risks and alternatives of the procedures and conscious  sedation were explained to the patient and informed consent was obtained.  Cardiac catheterization performed electively.  Local anesthetic given. Right brachial vein access. Local anesthetic given.  A 5FR GLIDESHEATH was inserted in the vessel, utilizing themodified  Seldinger technique. Right heart catheterization. The procedure was  performed utilizing a 5fr Arrow catheter under fluoroscopic guidance.  Measurements of pressures, arterial and venous oxygen saturation, and  cardiac output (by Xenia usingassumed VO2) were obtained. The catheter was  removed. Sonosite - Diagnostic. RADIATION EXPOSURE: 0.9 min.  HEMODYNAMICS: Hemodynamic assessment demonstrates normal pulmonary  capillary wedge pressure. There is severe pulmonary hypertension.  COMPLICATIONS: There were no complications.  DIAGNOSTIC RECOMMENDATIONS: Medical management is recommended.  Prepared and signed by  Norma Stringer M.D.  Signed 2018 16:14:37  HEMODYNAMIC TABLES  Pressures:  Baseline  Pressures:  - HR: 88  Pressures:  - Rhythm:  Pressures:  -- Pulmonary Artery (S/D/M): 88/31/56  Pressures:  -- Pulmonary Capillary Wedge: 10/10/6  Pressures:  -- Right Atrium (a/v/M): 13/4/4  Pressures:  -- Right Ventricle (s/edp): 90/14/--  O2 Sats:  Baseline  O2 Sats:  - HR: 88  O2 Sats:  - Rhythm:  O2 Sats:  -- AO: 9.8/97/12.93  O2 Sats:  -- PA: 9.8/61.2/8.16  Outputs:  Baseline  Outputs:  -- CALCULATIONS[age]  Outputs:  -- CALCULATIONS: Body Surface Area: 2.05  Outputs:  -- CALCULATIONS: Height in cm: 167.00  Outputs:  -- CALCULATIONS: Sex: Female  Outputs:  -- CALCULATIONS: Weight in k.00  Outputs:  -- OUTPUTS: CO by Xenia: 5.76  Outputs:  -- OUTPUTS: Xenia cardiac index: 2.81  Outputs:  -- OUTPUTS: O2 consumption: 275.00  Outputs:  -- OUTPUTS: Vo2 Indexed: 134.00  Outputs:  -- RESISTANCES: Pulmonary vascular index (dsc): 1423.90  Outputs:  -- RESISTANCES: Pulmonary vascular index (Wood Units): 17.80  Outputs:  -- RESISTANCES: Pulmonary vascular resistance (dsc): 693.85  Outputs:  -- RESISTANCES: Pulmonary vascular resistance (Wood Units): 8.68  Outputs:  -- RESISTANCES: Right ventricular stroke work: 46.32  Outputs:  -- RESISTANCES: Right ventricular stroke work index: 22.57  Outputs:  -- RESISTANCES: Total pulmonary index (dsc): 1594.77  Outputs:  -- RESISTANCES: Total pulmonary index (Wood Units): 19.94  Outputs:  -- RESISTANCES: Total pulmonary resistance (dsc): 777.11  Outputs:  -- RESISTANCES: Total pulmonary resistance (Wood Units): 9.72  Outputs:  -- SHUNTS: Pulmonary flow: 5.76  Outputs:  -- SHUNTS: Qp Indexed: 2.81  Outputs:  -- SHUNTS: Qs Indexed: 2.81  Outputs:  -- SHUNTS: Systemic flow: 5.76    < end of copied text > Ms. Conway is a 37 year old woman with idiopathic pulmonary arterial hypertension which was diagnosed in  at which time she presented with one year of progressive shortness of breath and volume overload. She was initially diuresed and started on pulmonary vasodilator therapy with riociguat. Unfortunately she had a hard time coming to  with her illness and did not take her medications with sufficient frequency. At some point she was started on therapy with nifedipine, but also did not take this with any regularity. She notes that over the last few years she has been severely limited. She does not leave the house due to her shortness of breath. She has frequent palpitations. She has had three episodes of syncope, the first in  and the last right before admission when taking a shower.     Since being in the hospital, she has required IV vasopressors due to hypotension and has been on increasing doses of midodrine.     Past medical history:  WHO Group I PAH, idiopathic  History of pulmonary embolism  Chronic hypoxic respiratory failure on home oxygen  GERD    Allergies    No Known Allergies    MEDICATIONS  (STANDING):  benzonatate 100 milliGRAM(s) Oral three times a day  docusate sodium 100 milliGRAM(s) Oral two times a day  epoprostenol Infusion 4 NANOGram(s)/kG/Min (4.656 mL/Hr) IV Continuous <Continuous>  ferrous    sulfate 325 milliGRAM(s) Oral daily  heparin  Infusion 1200 Unit(s)/Hr (10 mL/Hr) IV Continuous <Continuous>  ipratropium    for Nebulization 500 MICROGram(s) Nebulizer every 6 hours  midodrine 20 milliGRAM(s) Oral every 8 hours  multivitamin 1 Tablet(s) Oral daily  norepinephrine Infusion 0.05 MICROgram(s)/kG/Min (9.094 mL/Hr) IV Continuous <Continuous>  pantoprazole    Tablet 40 milliGRAM(s) Oral before breakfast  phenylephrine    Infusion 0.1 MICROgram(s)/kG/Min (3.638 mL/Hr) IV Continuous <Continuous>  polyethylene glycol 3350 17 Gram(s) Oral two times a day  Riociguat (Adempas) 2 milliGRAM(s) 2 milliGRAM(s) Oral three times a day  senna 2 Tablet(s) Oral at bedtime  spironolactone 25 milliGRAM(s) Oral daily    MEDICATIONS  (PRN):  acetaminophen   Tablet. 650 milliGRAM(s) Oral every 6 hours PRN mild to moderate pain  guaiFENesin    Syrup 100 milliGRAM(s) Oral every 6 hours PRN Cough    FAMILY HISTORY: She has no known family history of cardiac disease. Her mother has diabetes.     SOCIAL HISTORY: She has two children. She does not smoke cigarettes.     ROS: 10 point review of systems otherwise negative in detail.     Physical Exam:     ICU Vital Signs Last 24 Hrs  T(C): 36.9 (16 May 2018 11:00), Max: 36.9 (16 May 2018 04:00)  T(F): 98.5 (16 May 2018 11:00), Max: 98.5 (16 May 2018 04:00)  HR: 95 (16 May 2018 16:42) (87 - 148)  BP: 85/52 (16 May 2018 16:15) (79/51 - 120/69)  BP(mean): 64 (16 May 2018 16:15) (57 - 89)  RR: 20 (16 May 2018 16:15) (8 - 32)  SpO2: 100% (16 May 2018 16:42) (91% - 100%)    General: No distress. Slightly uncomfortable due to PA catheter in neck.  HEENT: EOM intact.  Neck: Neck supple. JVP not elevated. No masses  Chest: Clear to auscultation bilaterally  CV: Regular rate and rhythm with normal S1 and loud P2 across the precordium. There is a harsh II/VI systolic murmur heard over the PA. No gallops. Normal pulses. No edema.   Abdomen: Soft, non-distended, non-tender  Skin: No rashes or skin breakdown  Neurology: Alert and oriented times three. Sensation intact  Psych: Affect normal                          11.5   14.2  )-----------( 318      ( 15 May 2018 22:57 )             35.6     05-15    134<L>  |  96  |  10  ----------------------------<  98  3.6   |  19<L>  |  1.01    Ca    9.4      15 May 2018 22:57  Phos  4.2     05-15  Mg     1.7     05-15    TPro  7.6  /  Alb  4.3  /  TBili  0.8  /  DBili  x   /  AST  13  /  ALT  14  /  AlkPhos  41  05-15    EKG: I reviewed her EKG, showing RAD with right ventricular hypertrophy in normal sinus rhythm.    < from: Transthoracic Echocardiogram (18 @ 16:06) >    Patient name: ROXI CONWAY  YOB: 1980   Age: 37 (F)   MR#: [id number]  Study Date: 2018  Location: [address] [place], [place] [id number]: [facility]  Study quality: Technically fair  Referring Physician: Girish Pierre MD  Blood Pressure: 98/52 mmHg  Height: 168 cm  Weight: 97 kg  BSA: 2.1 m2  ------------------------------------------------------------------------  PROCEDURE: Transthoracic echocardiogram with 2-D, M-Mode  and complete spectral and color flow Doppler.  INDICATION: Dyspnea, unspecified (R06.00)  ------------------------------------------------------------------------  Dimensions:    Normal Values:  LA:     2.8    2.0 - 4.0 cm  Ao:     3.3    2.0 - 3.8 cm  SEPTUM: 0.9    0.6 - 1.2 cm  PWT:    0.8    0.6 - 1.1 cm  LVIDd:  3.4    3.0 - 5.6 cm  LVIDs:  1.6    1.8 - 4.0 cm  Derived variables:  LVMI: 38 g/m2  RWT: 0.47  EF (Visual Estimate): 70 %  Doppler Peak Velocity (m/sec): AoV=1.0 PV=1.3  ------------------------------------------------------------------------  Observations:  Mitral Valve: Normal mitral valve structure. Mild mitral  regurgitation.  Aortic Valve/Aorta: Tricuspid aortic valve with normal cusp  excursion. Peak transaortic valve gradient equals 4 mm Hg.  No aortic valve regurgitation seen.Peak left ventricular  outflow tract gradient equals 3 mm Hg.  Aortic Root (SoV): 3.3 cm.  Left Atrium: Normal left atrium.  Left Ventricle: Not all wall segments were well visualized.   Normal overall left ventricular systolic function with an  estimated ejection fraction of 70%. Left ventricular cavity  size appears small (left ventricular internal diastolic  dimension 3.4 cm). Normal diastolic function.  Right Heart: Right atrium not well visualized. Right  ventricular enlargement with decreased right ventricular  systolic function.  Flattening of the interventricular  septum in systole consistent with right ventricular  pressure overload. Normal tricuspid valve structure.  Minimal tricuspid regurgitation. No pulmonic stenosis. Mild  pulmonic regurgitation.  Pericardium/Pleura: No pericardial effusion seen.  Hemodynamic: Inadequate tricuspid regurgitation Doppler  envelope precludes estimation of RVSP.  ------------------------------------------------------------------------  Conclusions:  Suboptimal image quality.  1. Left ventricular cavity size appears small (left  ventricular internal diastolic dimension 3.4 cm).  2. Not all wall segments were well visualized.  Normal  overall left ventricular systolic function with an  estimated ejection fraction of 70%.  3. Normal diastolic function.  4. Right ventricular enlargement with decreased right  ventricular systolic function.  Flattening of the  interventricular septum in systole consistent with right  ventricular pressure overload.  5. Inadequate tricuspid regurgitation Doppler envelope  precludes estimation of RVSP.  ------------------------------------------------------------------------  Confirmed on  2018 - 18:24:17 by Tavares Woo M.D.  ------------------------------------------------------------------------    < end of copied text >      < from: Cardiac Cath Lab - Adult (18 @ 15:14) >    [facility]  Department of Cardiology  [address] [place] [address]  [phone]  Cath Lab Report -- Comprehensive Report  Patient: ROXI CONWAY  Study date: 2018  Account number: [id number]  MR number:[id number]  : 1980  Gender: Female  Race: O  Case Physician(s):  Norma Stringer M.D.  Referring Physician:  Girish Pierre M.D.  INDICATIONS: Primary pulmonary hypertension.  HISTORY: History includes pulmonary hypertension. The patient has  hypertension, medication-treated dyslipidemia, and a family history of  coronary artery disease.  PROCEDURE:  --  Right heart catheterization.  --  Sonosite - Diagnostic.  TECHNIQUE: The risks and alternatives of the procedures and conscious  sedation were explained to the patient and informed consent was obtained.  Cardiac catheterization performed electively.  Local anesthetic given. Right brachial vein access. Local anesthetic given.  A 5FR GLIDESHEATH was inserted in the vessel, utilizing themodified  Seldinger technique. Right heart catheterization. The procedure was  performed utilizing a 5fr Arrow catheter under fluoroscopic guidance.  Measurements of pressures, arterial and venous oxygen saturation, and  cardiac output (by Xenia usingassumed VO2) were obtained. The catheter was  removed. Sonosite - Diagnostic. RADIATION EXPOSURE: 0.9 min.  HEMODYNAMICS: Hemodynamic assessment demonstrates normal pulmonary  capillary wedge pressure. There is severe pulmonary hypertension.  COMPLICATIONS: There were no complications.  DIAGNOSTIC RECOMMENDATIONS: Medical management is recommended.  Prepared and signed by  Norma Stringer M.D.  Signed 2018 16:14:37  HEMODYNAMIC TABLES  Pressures:  Baseline  Pressures:  - HR: 88  Pressures:  - Rhythm:  Pressures:  -- Pulmonary Artery (S/D/M): 88/31/56  Pressures:  -- Pulmonary Capillary Wedge: 10/10/6  Pressures:  -- Right Atrium (a/v/M): 13/4/4  Pressures:  -- Right Ventricle (s/edp): 90/14/--  O2 Sats:  Baseline  O2 Sats:  - HR: 88  O2 Sats:  - Rhythm:  O2 Sats:  -- AO: 9.8/97/12.93  O2 Sats:  -- PA: 9.8/61.2/8.16  Outputs:  Baseline  Outputs:  -- CALCULATIONS[age]  Outputs:  -- CALCULATIONS: Body Surface Area: 2.05  Outputs:  -- CALCULATIONS: Height in cm: 167.00  Outputs:  -- CALCULATIONS: Sex: Female  Outputs:  -- CALCULATIONS: Weight in k.00  Outputs:  -- OUTPUTS: CO by Xenia: 5.76  Outputs:  -- OUTPUTS: Xenia cardiac index: 2.81  Outputs:  -- OUTPUTS: O2 consumption: 275.00  Outputs:  -- OUTPUTS: Vo2 Indexed: 134.00  Outputs:  -- RESISTANCES: Pulmonary vascular index (dsc): 1423.90  Outputs:  -- RESISTANCES: Pulmonary vascular index (Wood Units): 17.80  Outputs:  -- RESISTANCES: Pulmonary vascular resistance (dsc): 693.85  Outputs:  -- RESISTANCES: Pulmonary vascular resistance (Wood Units): 8.68  Outputs:  -- RESISTANCES: Right ventricular stroke work: 46.32  Outputs:  -- RESISTANCES: Right ventricular stroke work index: 22.57  Outputs:  -- RESISTANCES: Total pulmonary index (dsc): 1594.77  Outputs:  -- RESISTANCES: Total pulmonary index (Wood Units): 19.94  Outputs:  -- RESISTANCES: Total pulmonary resistance (dsc): 777.11  Outputs:  -- RESISTANCES: Total pulmonary resistance (Wood Units): 9.72  Outputs:  -- SHUNTS: Pulmonary flow: 5.76  Outputs:  -- SHUNTS: Qp Indexed: 2.81  Outputs:  -- SHUNTS: Qs Indexed: 2.81  Outputs:  -- SHUNTS: Systemic flow: 5.76    < end of copied text >

## 2018-05-16 NOTE — PROGRESS NOTE ADULT - ATTENDING COMMENTS
Critically ill with PAH For PAC prostacyclin trial    Frequent bedside visits with therapy change today. Crit Care Time Today 35 min +

## 2018-05-16 NOTE — CONSULT NOTE ADULT - PROBLEM SELECTOR RECOMMENDATION 4
If she becomes hypoxic with uptitration of epoprostenol, we will need to consider alternative diagnoses, such as PVOD.

## 2018-05-16 NOTE — CONSULT NOTE ADULT - PROBLEM SELECTOR RECOMMENDATION 3
Notable for improvement since 2014. In particular, her RV is not showing evidence of volume overload, but remains pressure overloaded.

## 2018-05-16 NOTE — PROCEDURE NOTE - NSPOSTPRCRAD_GEN_A_CORE
no pneumothorax/central line located in the superior vena cava/post-procedure radiography performed
VBG done

## 2018-05-16 NOTE — PROCEDURE NOTE - NSPROCDETAILS_GEN_ALL_CORE
ultrasound guidance/lumen(s) aspirated and flushed/guidewire recovered/sterile technique, catheter placed/sterile dressing applied
lumen(s) aspirated and flushed/guidewire recovered/sterile dressing applied/sterile technique, catheter placed/ultrasound guidance

## 2018-05-17 LAB
ALBUMIN SERPL ELPH-MCNC: 3.6 G/DL — SIGNIFICANT CHANGE UP (ref 3.3–5)
ALP SERPL-CCNC: 31 U/L — LOW (ref 40–120)
ALT FLD-CCNC: 9 U/L — LOW (ref 10–45)
ANION GAP SERPL CALC-SCNC: 12 MMOL/L — SIGNIFICANT CHANGE UP (ref 5–17)
APTT BLD: 45.3 SEC — HIGH (ref 27.5–37.4)
APTT BLD: 46.3 SEC — HIGH (ref 27.5–37.4)
APTT BLD: 56.7 SEC — HIGH (ref 27.5–37.4)
APTT BLD: 60.5 SEC — HIGH (ref 27.5–37.4)
APTT BLD: 62 SEC — HIGH (ref 27.5–37.4)
AST SERPL-CCNC: 12 U/L — SIGNIFICANT CHANGE UP (ref 10–40)
BASOPHILS # BLD AUTO: 0.1 K/UL — SIGNIFICANT CHANGE UP (ref 0–0.2)
BASOPHILS NFR BLD AUTO: 0.7 % — SIGNIFICANT CHANGE UP (ref 0–2)
BILIRUB SERPL-MCNC: 0.4 MG/DL — SIGNIFICANT CHANGE UP (ref 0.2–1.2)
BUN SERPL-MCNC: 8 MG/DL — SIGNIFICANT CHANGE UP (ref 7–23)
CALCIUM SERPL-MCNC: 8.6 MG/DL — SIGNIFICANT CHANGE UP (ref 8.4–10.5)
CHLORIDE SERPL-SCNC: 104 MMOL/L — SIGNIFICANT CHANGE UP (ref 96–108)
CO2 SERPL-SCNC: 20 MMOL/L — LOW (ref 22–31)
CREAT SERPL-MCNC: 0.96 MG/DL — SIGNIFICANT CHANGE UP (ref 0.5–1.3)
EOSINOPHIL # BLD AUTO: 0.6 K/UL — HIGH (ref 0–0.5)
EOSINOPHIL NFR BLD AUTO: 4.2 % — SIGNIFICANT CHANGE UP (ref 0–6)
GIANT PLATELETS BLD QL SMEAR: PRESENT — SIGNIFICANT CHANGE UP
GLUCOSE SERPL-MCNC: 89 MG/DL — SIGNIFICANT CHANGE UP (ref 70–99)
HCT VFR BLD CALC: 30.6 % — LOW (ref 34.5–45)
HCT VFR BLD CALC: 31.4 % — LOW (ref 34.5–45)
HCT VFR BLD CALC: 33.9 % — LOW (ref 34.5–45)
HGB BLD-MCNC: 9.7 G/DL — LOW (ref 11.5–15.5)
HGB BLD-MCNC: 9.9 G/DL — LOW (ref 11.5–15.5)
HGB BLD-MCNC: 9.9 G/DL — LOW (ref 11.5–15.5)
INR BLD: 1.14 RATIO — SIGNIFICANT CHANGE UP (ref 0.88–1.16)
LYMPHOCYTES # BLD AUTO: 19.9 % — SIGNIFICANT CHANGE UP (ref 13–44)
LYMPHOCYTES # BLD AUTO: 2.7 K/UL — SIGNIFICANT CHANGE UP (ref 1–3.3)
MAGNESIUM SERPL-MCNC: 1.8 MG/DL — SIGNIFICANT CHANGE UP (ref 1.6–2.6)
MCHC RBC-ENTMCNC: 19.4 PG — LOW (ref 27–34)
MCHC RBC-ENTMCNC: 20.6 PG — LOW (ref 27–34)
MCHC RBC-ENTMCNC: 20.8 PG — LOW (ref 27–34)
MCHC RBC-ENTMCNC: 29.2 GM/DL — LOW (ref 32–36)
MCHC RBC-ENTMCNC: 31.6 GM/DL — LOW (ref 32–36)
MCHC RBC-ENTMCNC: 31.6 GM/DL — LOW (ref 32–36)
MCV RBC AUTO: 65.3 FL — LOW (ref 80–100)
MCV RBC AUTO: 65.8 FL — LOW (ref 80–100)
MCV RBC AUTO: 66.3 FL — LOW (ref 80–100)
MONOCYTES # BLD AUTO: 1 K/UL — HIGH (ref 0–0.9)
MONOCYTES NFR BLD AUTO: 7.7 % — SIGNIFICANT CHANGE UP (ref 2–14)
NEUTROPHILS # BLD AUTO: 9.2 K/UL — HIGH (ref 1.8–7.4)
NEUTROPHILS NFR BLD AUTO: 67.6 % — SIGNIFICANT CHANGE UP (ref 43–77)
PHOSPHATE SERPL-MCNC: 3.5 MG/DL — SIGNIFICANT CHANGE UP (ref 2.5–4.5)
PLAT MORPH BLD: ABNORMAL
PLATELET # BLD AUTO: 286 K/UL — SIGNIFICANT CHANGE UP (ref 150–400)
PLATELET # BLD AUTO: 297 K/UL — SIGNIFICANT CHANGE UP (ref 150–400)
PLATELET # BLD AUTO: 311 K/UL — SIGNIFICANT CHANGE UP (ref 150–400)
POTASSIUM SERPL-MCNC: 3.8 MMOL/L — SIGNIFICANT CHANGE UP (ref 3.5–5.3)
POTASSIUM SERPL-SCNC: 3.8 MMOL/L — SIGNIFICANT CHANGE UP (ref 3.5–5.3)
PROCALCITONIN SERPL-MCNC: 0.07 NG/ML — HIGH (ref 0–0.04)
PROT SERPL-MCNC: 6.5 G/DL — SIGNIFICANT CHANGE UP (ref 6–8.3)
PROTHROM AB SERPL-ACNC: 12.5 SEC — SIGNIFICANT CHANGE UP (ref 9.8–12.7)
RBC # BLD: 4.66 M/UL — SIGNIFICANT CHANGE UP (ref 3.8–5.2)
RBC # BLD: 4.81 M/UL — SIGNIFICANT CHANGE UP (ref 3.8–5.2)
RBC # BLD: 5.11 M/UL — SIGNIFICANT CHANGE UP (ref 3.8–5.2)
RBC # FLD: 18.9 % — HIGH (ref 10.3–14.5)
RBC # FLD: 19 % — HIGH (ref 10.3–14.5)
RBC # FLD: 19.4 % — HIGH (ref 10.3–14.5)
RBC BLD AUTO: SIGNIFICANT CHANGE UP
SODIUM SERPL-SCNC: 136 MMOL/L — SIGNIFICANT CHANGE UP (ref 135–145)
T3FREE SERPL-MCNC: 3 PG/ML — SIGNIFICANT CHANGE UP (ref 1.8–4.6)
T4 AB SER-ACNC: 9.5 UG/DL — SIGNIFICANT CHANGE UP (ref 4.6–12)
T4 FREE SERPL-MCNC: 1.9 NG/DL — HIGH (ref 0.9–1.8)
TSH SERPL-MCNC: 0.58 UIU/ML — SIGNIFICANT CHANGE UP (ref 0.27–4.2)
WBC # BLD: 12.4 K/UL — HIGH (ref 3.8–10.5)
WBC # BLD: 12.5 K/UL — HIGH (ref 3.8–10.5)
WBC # BLD: 13.6 K/UL — HIGH (ref 3.8–10.5)
WBC # FLD AUTO: 12.4 K/UL — HIGH (ref 3.8–10.5)
WBC # FLD AUTO: 12.5 K/UL — HIGH (ref 3.8–10.5)
WBC # FLD AUTO: 13.6 K/UL — HIGH (ref 3.8–10.5)

## 2018-05-17 PROCEDURE — 93308 TTE F-UP OR LMTD: CPT | Mod: 26

## 2018-05-17 PROCEDURE — 76604 US EXAM CHEST: CPT | Mod: 26

## 2018-05-17 PROCEDURE — 93010 ELECTROCARDIOGRAM REPORT: CPT

## 2018-05-17 PROCEDURE — 99291 CRITICAL CARE FIRST HOUR: CPT | Mod: 25

## 2018-05-17 PROCEDURE — 71045 X-RAY EXAM CHEST 1 VIEW: CPT | Mod: 26

## 2018-05-17 RX ORDER — HEPARIN SODIUM 5000 [USP'U]/ML
1200 INJECTION INTRAVENOUS; SUBCUTANEOUS
Qty: 25000 | Refills: 0 | Status: DISCONTINUED | OUTPATIENT
Start: 2018-05-17 | End: 2018-05-21

## 2018-05-17 RX ORDER — HEPARIN SODIUM 5000 [USP'U]/ML
1100 INJECTION INTRAVENOUS; SUBCUTANEOUS
Qty: 25000 | Refills: 0 | Status: DISCONTINUED | OUTPATIENT
Start: 2018-05-17 | End: 2018-05-17

## 2018-05-17 RX ORDER — ACETAMINOPHEN 500 MG
1000 TABLET ORAL ONCE
Qty: 0 | Refills: 0 | Status: COMPLETED | OUTPATIENT
Start: 2018-05-17 | End: 2018-05-17

## 2018-05-17 RX ORDER — POTASSIUM CHLORIDE 20 MEQ
20 PACKET (EA) ORAL ONCE
Qty: 0 | Refills: 0 | Status: COMPLETED | OUTPATIENT
Start: 2018-05-17 | End: 2018-05-17

## 2018-05-17 RX ORDER — SODIUM CHLORIDE 9 MG/ML
250 INJECTION INTRAMUSCULAR; INTRAVENOUS; SUBCUTANEOUS ONCE
Qty: 0 | Refills: 0 | Status: COMPLETED | OUTPATIENT
Start: 2018-05-17 | End: 2018-05-17

## 2018-05-17 RX ORDER — MAGNESIUM SULFATE 500 MG/ML
1 VIAL (ML) INJECTION ONCE
Qty: 0 | Refills: 0 | Status: COMPLETED | OUTPATIENT
Start: 2018-05-17 | End: 2018-05-17

## 2018-05-17 RX ADMIN — Medication 100 GRAM(S): at 02:29

## 2018-05-17 RX ADMIN — PHENYLEPHRINE HYDROCHLORIDE 3.64 MICROGRAM(S)/KG/MIN: 10 INJECTION INTRAVENOUS at 21:22

## 2018-05-17 RX ADMIN — Medication 100 MILLIGRAM(S): at 06:45

## 2018-05-17 RX ADMIN — MIDODRINE HYDROCHLORIDE 20 MILLIGRAM(S): 2.5 TABLET ORAL at 06:45

## 2018-05-17 RX ADMIN — Medication 500 MICROGRAM(S): at 15:41

## 2018-05-17 RX ADMIN — Medication 650 MILLIGRAM(S): at 19:00

## 2018-05-17 RX ADMIN — Medication 400 MILLIGRAM(S): at 02:29

## 2018-05-17 RX ADMIN — MIDODRINE HYDROCHLORIDE 20 MILLIGRAM(S): 2.5 TABLET ORAL at 14:27

## 2018-05-17 RX ADMIN — Medication 100 MILLIGRAM(S): at 14:27

## 2018-05-17 RX ADMIN — Medication 1000 MILLIGRAM(S): at 03:00

## 2018-05-17 RX ADMIN — Medication 100 MILLIEQUIVALENT(S): at 02:29

## 2018-05-17 RX ADMIN — Medication 30 MILLILITER(S): at 09:02

## 2018-05-17 RX ADMIN — Medication 30 MILLILITER(S): at 21:30

## 2018-05-17 RX ADMIN — Medication 500 MICROGRAM(S): at 09:22

## 2018-05-17 RX ADMIN — Medication 650 MILLIGRAM(S): at 18:00

## 2018-05-17 RX ADMIN — MIDODRINE HYDROCHLORIDE 20 MILLIGRAM(S): 2.5 TABLET ORAL at 21:22

## 2018-05-17 RX ADMIN — PANTOPRAZOLE SODIUM 40 MILLIGRAM(S): 20 TABLET, DELAYED RELEASE ORAL at 05:52

## 2018-05-17 RX ADMIN — SODIUM CHLORIDE 500 MILLILITER(S): 9 INJECTION INTRAMUSCULAR; INTRAVENOUS; SUBCUTANEOUS at 05:53

## 2018-05-17 RX ADMIN — Medication 100 MILLIGRAM(S): at 21:22

## 2018-05-17 RX ADMIN — Medication 1 TABLET(S): at 12:14

## 2018-05-17 RX ADMIN — SODIUM CHLORIDE 500 MILLILITER(S): 9 INJECTION INTRAMUSCULAR; INTRAVENOUS; SUBCUTANEOUS at 11:00

## 2018-05-17 RX ADMIN — HEPARIN SODIUM 11 UNIT(S)/HR: 5000 INJECTION INTRAVENOUS; SUBCUTANEOUS at 01:13

## 2018-05-17 NOTE — CHART NOTE - NSCHARTNOTEFT_GEN_A_CORE
: Deepa Hastings MD    INDICATION: pulmonary HTN    PROCEDURE:  [x] LIMITED ECHO  [x] LIMITED CHEST  [ ] LIMITED RETROPERITONEAL  [ ] LIMITED ABDOMINAL  [ ] LIMITED DVT  [ ] NEEDLE GUIDANCE VASCULAR  [ ] NEEDLE GUIDANCE THORACENTESIS  [ ] NEEDLE GUIDANCE PARACENTESIS  [ ] NEEDLE GUIDANCE PERICARDIOCENTESIS  [ ] OTHER    FINDINGS:    Chest: A line pattern bilaterally, no pleural effusions.   Cardiac: Normal LV function. RV is not enlarged. Thickening of the RV free wall. No effusion    INTERPRETATION: Normal aeration pattern. Normal LV function. No dilatation of RV.

## 2018-05-17 NOTE — PROGRESS NOTE ADULT - ASSESSMENT
Patient is a 37 year-old female admitted to the MICU to institute Flolan therapy for baseline pulmonary HTN, patient subsequently developed hypotension 2/2 medication and was started on vasopressors. Patient at this time, followed by pulmonary team, goal is to take off vasopressor support and maintain prostacyclin, with improvement of function, CO and pulmonary compliance. Currently managed with heparin gtt for chronic PE. Will place swan cathy catheter to measure pressures directly to assess for treatment effectiveness.     #Neuro: no issues  -monitor MS  -OOB, PT    #Skin- right IJ in place for vasopressors, clean dressing  -S/p R. femoral triple lumen for additional access  -WIll convert R. IJV access to [place]-Cathy catheter for detailed hemodynamic monitoring  -No acute issues    #GI  -Added lactulose, miralax, to titrate to bowel movements daily  -C/w pantoprazole 40mg daily  -Regular diet    #Renal: no active issues  -Trend I's+O's, S-Cr    #Heme-   -on heparin gtt for hx of chronic PE, continue per normogram  -monitor for bleeding  -hx of JULIA on ferrous sulfate, MV    #ID- no active issues, increasing WBC with no evidence of infection.   -Monitor Tmax and WBC    #CV: Hx Pulm Htn diagnosed in 2014, right heart cath on 5/5 with elevated right pressures; swan cathy catheterization at bedside to evaluate for PA pressures.   -continue with epoprostenol gtt, titrates as tolerated  -hypotension 2/2 medication, continue to titrate phenylephrine to maintain  -Continue with midodrine 20mg TID  -Monitor hemodynamics    #Pulm: Asthma / Pulm Htn/ PE (hx)  - continue with pulm HTN treatment as above  -monitor O2 sat, O2 supplementation via NC at nighttime  -On heparin gtt for known VTE    #VTE PPx:  -On heparin drip Patient is a 37 year-old female admitted to the MICU to institute Flolan therapy for baseline pulmonary HTN, patient subsequently developed hypotension 2/2 medication and was started on vasopressors. Patient at this time, followed by pulmonary team, goal is to take off vasopressor support and maintain prostacyclin, with improvement of function, CO and pulmonary compliance. Currently managed with heparin gtt for chronic PE. Will place swan cathy catheter to measure pressures directly to assess for treatment effectiveness.     #Neuro: no issues  -monitor MS  -OOB, PT    #Skin- right IJ now switched to [place]-Cathy  -S/p R. femoral triple lumen for additional access  -No acute issues    #GI  -Added lactulose, miralax, to titrate to bowel movements daily  -C/w pantoprazole 40mg daily  -Regular diet    #Renal: no active issues  -Trend I's+O's, S-Cr    #Heme-   -on heparin gtt for hx of chronic PE, continue per normogram  -monitor for bleeding  -hx of JULIA on ferrous sulfate, MV    #ID- no active issues, increasing WBC with no evidence of infection.   -Monitor Tmax and WBC    #CV: Hx Pulm HTN diagnosed in 2014, right heart cath on 5/5 with elevated right pressures; swan cathy catheterization at bedside to evaluate for PA pressures.   -Wean off of phenylephrine as tolerated  -Continue with midodrine 20mg TID  -Monitor hemodynamics    #Pulm: Asthma / Pulm Htn/ PE (hx)  -Holding flolan and adempas at this time given persistent hypotension  -IVF resuscitation as tolerated  -monitor O2 sat, O2 supplementation via NC at nighttime  -On heparin gtt for known VTE    #VTE PPx:  -On heparin drip

## 2018-05-17 NOTE — PROGRESS NOTE ADULT - SUBJECTIVE AND OBJECTIVE BOX
CHIEF COMPLAINT:36 y/o F with hx of Pulmonary arterial hypertension who was admitted on 5/1 to medicine for SOB, unable to control her symptoms, transitioned to prostacyclin via IV and became hypotensive requiring vasopressors.   Overnight complaining of dizziness and palpitations.    Interval Events: Flolan held overnight. S/p [place] Cathy placement    REVIEW OF SYSTEMS:  Constitutional: [x ] negative [ ] fevers [ ] chills [ ] weight loss [ ] weight gain  HEENT: [x ] negative [ ] dry eyes [ ] eye irritation [ ] postnasal drip [ ] nasal congestion  CV: [x ] negative  [ ] chest pain [ ] orthopnea [x ] palpitations [ ] murmur  Resp: [x ] negative [ ] cough [ ] shortness of breath [ ] dyspnea [ ] wheezing [ ] sputum [ ] hemoptysis  GI: [ x] negative [ ] nausea [ ] vomiting [ ] diarrhea [ ] constipation [ ] abd pain [ ] dysphagia   : [x ] negative [ ] dysuria [ ] nocturia [ ] hematuria [ ] increased urinary frequency  Musculoskeletal: [ ] negative [ ] back pain [ ] myalgias [ ] arthralgias [ ] fracture  Skin: [x ] negative [ ] rash [ ] itch  Neurological: [x ] negative [ ] headache [ ] dizziness [ ] syncope [ ] weakness [ ] numbness  Psychiatric: [x ] negative [ ] anxiety [ ] depression  Endocrine: [x ] negative [ ] diabetes [ ] thyroid problem  Hematologic/Lymphatic: [x ] negative [ ] anemia [ ] bleeding problem  Allergic/Immunologic: [x ] negative [ ] itchy eyes [ ] nasal discharge [ ] hives [ ] angioedema  [ ] All other systems negative  [ ] Unable to assess ROS because ________    OBJECTIVE:  ICU Vital Signs Last 24 Hrs  T(C): 37.1 (17 May 2018 04:00), Max: 37.3 (17 May 2018 00:00)  T(F): 98.7 (17 May 2018 04:00), Max: 99.1 (17 May 2018 00:00)  HR: 78 (17 May 2018 07:00) (72 - 128)  BP: 92/51 (17 May 2018 07:00) (77/45 - 113/60)  BP(mean): 67 (17 May 2018 07:00) (57 - 82)  ABP: --  ABP(mean): --  RR: 17 (17 May 2018 07:00) (3 - 35)  SpO2: 92% (17 May 2018 07:00) (90% - 100%)        05-16 @ 07:01  -  05-17 @ 07:00  --------------------------------------------------------  IN: 1850 mL / OUT: 1650 mL / NET: 200 mL      CAPILLARY BLOOD GLUCOSE          PHYSICAL EXAM:  General:   HEENT:   Lymph Nodes:  Neck:   Respiratory:   Cardiovascular:   Abdomen:   Extremities:   Skin:   Neurological:  Psychiatry:    LINES:    HOSPITAL MEDICATIONS:  Standing Meds:  benzonatate 100 milliGRAM(s) Oral three times a day  docusate sodium 100 milliGRAM(s) Oral two times a day  ferrous    sulfate 325 milliGRAM(s) Oral daily  heparin  Infusion 1100 Unit(s)/Hr IV Continuous <Continuous>  ipratropium    for Nebulization 500 MICROGram(s) Nebulizer every 6 hours  midodrine 20 milliGRAM(s) Oral every 8 hours  multivitamin 1 Tablet(s) Oral daily  norepinephrine Infusion 0.05 MICROgram(s)/kG/Min IV Continuous <Continuous>  pantoprazole    Tablet 40 milliGRAM(s) Oral before breakfast  phenylephrine    Infusion 0.1 MICROgram(s)/kG/Min IV Continuous <Continuous>  polyethylene glycol 3350 17 Gram(s) Oral two times a day  Riociguat (Adempas) 2 milliGRAM(s) 2 milliGRAM(s) Oral three times a day  senna 2 Tablet(s) Oral at bedtime  spironolactone 25 milliGRAM(s) Oral daily      PRN Meds:  acetaminophen   Tablet. 650 milliGRAM(s) Oral every 6 hours PRN  guaiFENesin    Syrup 100 milliGRAM(s) Oral every 6 hours PRN      LABS:                        9.9    12.5  )-----------( 286      ( 17 May 2018 01:40 )             31.4     Hgb Trend: 9.9<--, 9.7<--, 11.5<--, 11.6<--, 10.7<--  05-17    136  |  104  |  8   ----------------------------<  89  3.8   |  20<L>  |  0.96    Ca    8.6      17 May 2018 00:32  Phos  3.5     05-17  Mg     1.8     05-17    TPro  6.5  /  Alb  3.6  /  TBili  0.4  /  DBili  x   /  AST  12  /  ALT  9<L>  /  AlkPhos  31<L>  05-17    Creatinine Trend: 0.96<--, 1.01<--, 0.95<--, 0.94<--, 1.05<--, 0.98<--  PT/INR - ( 17 May 2018 00:32 )   PT: 12.5 sec;   INR: 1.14 ratio         PTT - ( 17 May 2018 00:32 )  PTT:46.3 sec      Venous Blood Gas:  05-16 @ 00:27  7.45/33/32/22/54  VBG Lactate: 1.5      MICROBIOLOGY:     Culture - Blood (collected 16 May 2018 05:47)  Source: .Blood Blood-Peripheral  Preliminary Report (17 May 2018 06:01):    No growth to date.      RADIOLOGY:  [ ] Reviewed and interpreted by me    EKG: CHIEF COMPLAINT:36 y/o F with hx of Pulmonary arterial hypertension who was admitted on 5/1 to medicine for SOB, unable to control her symptoms, transitioned to prostacyclin via IV and became hypotensive requiring vasopressors.   Overnight complaining of dizziness and palpitations.    Interval Events: Flolan held overnight. S/p [place] Cathy placement    REVIEW OF SYSTEMS:  Constitutional: [x ] negative [ ] fevers [ ] chills [ ] weight loss [ ] weight gain  HEENT: [x ] negative [ ] dry eyes [ ] eye irritation [ ] postnasal drip [ ] nasal congestion  CV: [x ] negative  [ ] chest pain [ ] orthopnea [x ] palpitations [ ] murmur  Resp: [x ] negative [ ] cough [ ] shortness of breath [ ] dyspnea [ ] wheezing [ ] sputum [ ] hemoptysis  GI: [ x] negative [ ] nausea [ ] vomiting [ ] diarrhea [ ] constipation [ ] abd pain [ ] dysphagia   : [x ] negative [ ] dysuria [ ] nocturia [ ] hematuria [ ] increased urinary frequency  Musculoskeletal: [ ] negative [ ] back pain [ ] myalgias [ ] arthralgias [ ] fracture  Skin: [x ] negative [ ] rash [ ] itch  Neurological: [x ] negative [ ] headache [ ] dizziness [ ] syncope [ ] weakness [ ] numbness  Psychiatric: [x ] negative [ ] anxiety [ ] depression  Endocrine: [x ] negative [ ] diabetes [ ] thyroid problem  Hematologic/Lymphatic: [x ] negative [ ] anemia [ ] bleeding problem  Allergic/Immunologic: [x ] negative [ ] itchy eyes [ ] nasal discharge [ ] hives [ ] angioedema  [ ] All other systems negative  [ ] Unable to assess ROS because ________    OBJECTIVE:  ICU Vital Signs Last 24 Hrs  T(C): 37.1 (17 May 2018 04:00), Max: 37.3 (17 May 2018 00:00)  T(F): 98.7 (17 May 2018 04:00), Max: 99.1 (17 May 2018 00:00)  HR: 78 (17 May 2018 07:00) (72 - 128)  BP: 92/51 (17 May 2018 07:00) (77/45 - 113/60)  BP(mean): 67 (17 May 2018 07:00) (57 - 82)  ABP: --  ABP(mean): --  RR: 17 (17 May 2018 07:00) (3 - 35)  SpO2: 92% (17 May 2018 07:00) (90% - 100%)        05-16 @ 07:01  -  05-17 @ 07:00  --------------------------------------------------------  IN: 1850 mL / OUT: 1650 mL / NET: 200 mL      CAPILLARY BLOOD GLUCOSE          PHYSICAL EXAM:  General: Comfortable, NAD  HEENT: EOMI, PERRLA  Lymph Nodes: no cervical  or clavicular adenopathy  Neck: no thyromegaly, midline trachea  Respiratory:  CTA b/l, no wheezing  Cardiovascular:  +S1S2, RRR  Abdomen: soft, NTND, bowel sounds present  Extremities: SHO, no joint erythema  Skin: dry, intact  Neurological: AAO x3, no focal deficits  Psychiatry: Appropriate mood and affect    LINES:    HOSPITAL MEDICATIONS:  Standing Meds:  benzonatate 100 milliGRAM(s) Oral three times a day  docusate sodium 100 milliGRAM(s) Oral two times a day  ferrous    sulfate 325 milliGRAM(s) Oral daily  heparin  Infusion 1100 Unit(s)/Hr IV Continuous <Continuous>  ipratropium    for Nebulization 500 MICROGram(s) Nebulizer every 6 hours  midodrine 20 milliGRAM(s) Oral every 8 hours  multivitamin 1 Tablet(s) Oral daily  norepinephrine Infusion 0.05 MICROgram(s)/kG/Min IV Continuous <Continuous>  pantoprazole    Tablet 40 milliGRAM(s) Oral before breakfast  phenylephrine    Infusion 0.1 MICROgram(s)/kG/Min IV Continuous <Continuous>  polyethylene glycol 3350 17 Gram(s) Oral two times a day  Riociguat (Adempas) 2 milliGRAM(s) 2 milliGRAM(s) Oral three times a day  senna 2 Tablet(s) Oral at bedtime  spironolactone 25 milliGRAM(s) Oral daily      PRN Meds:  acetaminophen   Tablet. 650 milliGRAM(s) Oral every 6 hours PRN  guaiFENesin    Syrup 100 milliGRAM(s) Oral every 6 hours PRN      LABS:                        9.9    12.5  )-----------( 286      ( 17 May 2018 01:40 )             31.4     Hgb Trend: 9.9<--, 9.7<--, 11.5<--, 11.6<--, 10.7<--  05-17    136  |  104  |  8   ----------------------------<  89  3.8   |  20<L>  |  0.96    Ca    8.6      17 May 2018 00:32  Phos  3.5     05-17  Mg     1.8     05-17    TPro  6.5  /  Alb  3.6  /  TBili  0.4  /  DBili  x   /  AST  12  /  ALT  9<L>  /  AlkPhos  31<L>  05-17    Creatinine Trend: 0.96<--, 1.01<--, 0.95<--, 0.94<--, 1.05<--, 0.98<--  PT/INR - ( 17 May 2018 00:32 )   PT: 12.5 sec;   INR: 1.14 ratio         PTT - ( 17 May 2018 00:32 )  PTT:46.3 sec      Venous Blood Gas:  05-16 @ 00:27  7.45/33/32/22/54  VBG Lactate: 1.5      MICROBIOLOGY:     Culture - Blood (collected 16 May 2018 05:47)  Source: .Blood Blood-Peripheral  Preliminary Report (17 May 2018 06:01):    No growth to date.      RADIOLOGY:  [ ] Reviewed and interpreted by me    EKG:

## 2018-05-17 NOTE — PROGRESS NOTE ADULT - ATTENDING COMMENTS
Critically ill on pressors with severe PAH PAC directed med titration in place   Frequent bedside visits with therapy change today. Crit Care Time Today 35 min +

## 2018-05-18 DIAGNOSIS — R07.89 OTHER CHEST PAIN: ICD-10-CM

## 2018-05-18 LAB
ALBUMIN SERPL ELPH-MCNC: 3.7 G/DL — SIGNIFICANT CHANGE UP (ref 3.3–5)
ALP SERPL-CCNC: 33 U/L — LOW (ref 40–120)
ALT FLD-CCNC: 10 U/L — SIGNIFICANT CHANGE UP (ref 10–45)
ANION GAP SERPL CALC-SCNC: 13 MMOL/L — SIGNIFICANT CHANGE UP (ref 5–17)
AST SERPL-CCNC: 12 U/L — SIGNIFICANT CHANGE UP (ref 10–40)
BASOPHILS # BLD AUTO: 0.1 K/UL — SIGNIFICANT CHANGE UP (ref 0–0.2)
BASOPHILS NFR BLD AUTO: 0.6 % — SIGNIFICANT CHANGE UP (ref 0–2)
BILIRUB SERPL-MCNC: 0.4 MG/DL — SIGNIFICANT CHANGE UP (ref 0.2–1.2)
BUN SERPL-MCNC: 5 MG/DL — LOW (ref 7–23)
C3 SERPL-MCNC: 117 MG/DL — SIGNIFICANT CHANGE UP (ref 80–180)
C4 SERPL-MCNC: 42 MG/DL — SIGNIFICANT CHANGE UP (ref 10–45)
CALCIUM SERPL-MCNC: 9 MG/DL — SIGNIFICANT CHANGE UP (ref 8.4–10.5)
CHLORIDE SERPL-SCNC: 102 MMOL/L — SIGNIFICANT CHANGE UP (ref 96–108)
CO2 SERPL-SCNC: 21 MMOL/L — LOW (ref 22–31)
CORTIS AM PEAK SERPL-MCNC: 15 UG/DL — SIGNIFICANT CHANGE UP (ref 6–18.4)
CREAT SERPL-MCNC: 0.93 MG/DL — SIGNIFICANT CHANGE UP (ref 0.5–1.3)
CRP SERPL-MCNC: 4.2 MG/DL — HIGH (ref 0–0.4)
DSDNA AB FLD-ACNC: <0.2 AI — SIGNIFICANT CHANGE UP
ENA SS-A AB FLD IA-ACNC: <0.2 AI — SIGNIFICANT CHANGE UP
EOSINOPHIL # BLD AUTO: 0.4 K/UL — SIGNIFICANT CHANGE UP (ref 0–0.5)
EOSINOPHIL NFR BLD AUTO: 3.6 % — SIGNIFICANT CHANGE UP (ref 0–6)
ERYTHROCYTE [SEDIMENTATION RATE] IN BLOOD: 24 MM/HR — HIGH (ref 0–15)
GLUCOSE SERPL-MCNC: 92 MG/DL — SIGNIFICANT CHANGE UP (ref 70–99)
HCT VFR BLD CALC: 31.3 % — LOW (ref 34.5–45)
HCT VFR BLD CALC: 31.5 % — LOW (ref 34.5–45)
HGB BLD-MCNC: 9.6 G/DL — LOW (ref 11.5–15.5)
HGB BLD-MCNC: 9.8 G/DL — LOW (ref 11.5–15.5)
LYMPHOCYTES # BLD AUTO: 2.4 K/UL — SIGNIFICANT CHANGE UP (ref 1–3.3)
LYMPHOCYTES # BLD AUTO: 20.6 % — SIGNIFICANT CHANGE UP (ref 13–44)
MAGNESIUM SERPL-MCNC: 1.8 MG/DL — SIGNIFICANT CHANGE UP (ref 1.6–2.6)
MCHC RBC-ENTMCNC: 20.3 PG — LOW (ref 27–34)
MCHC RBC-ENTMCNC: 20.5 PG — LOW (ref 27–34)
MCHC RBC-ENTMCNC: 30.8 GM/DL — LOW (ref 32–36)
MCHC RBC-ENTMCNC: 31 GM/DL — LOW (ref 32–36)
MCV RBC AUTO: 65.9 FL — LOW (ref 80–100)
MCV RBC AUTO: 66.1 FL — LOW (ref 80–100)
MONOCYTES # BLD AUTO: 1 K/UL — HIGH (ref 0–0.9)
MONOCYTES NFR BLD AUTO: 8.6 % — SIGNIFICANT CHANGE UP (ref 2–14)
NEUTROPHILS # BLD AUTO: 7.6 K/UL — HIGH (ref 1.8–7.4)
NEUTROPHILS NFR BLD AUTO: 66.6 % — SIGNIFICANT CHANGE UP (ref 43–77)
PHOSPHATE SERPL-MCNC: 2.9 MG/DL — SIGNIFICANT CHANGE UP (ref 2.5–4.5)
PLATELET # BLD AUTO: 249 K/UL — SIGNIFICANT CHANGE UP (ref 150–400)
PLATELET # BLD AUTO: 277 K/UL — SIGNIFICANT CHANGE UP (ref 150–400)
POTASSIUM SERPL-MCNC: 3.8 MMOL/L — SIGNIFICANT CHANGE UP (ref 3.5–5.3)
POTASSIUM SERPL-SCNC: 3.8 MMOL/L — SIGNIFICANT CHANGE UP (ref 3.5–5.3)
PROT SERPL-MCNC: 6.4 G/DL — SIGNIFICANT CHANGE UP (ref 6–8.3)
RBC # BLD: 4.75 M/UL — SIGNIFICANT CHANGE UP (ref 3.8–5.2)
RBC # BLD: 4.76 M/UL — SIGNIFICANT CHANGE UP (ref 3.8–5.2)
RBC # FLD: 18.9 % — HIGH (ref 10.3–14.5)
RBC # FLD: 19.1 % — HIGH (ref 10.3–14.5)
SODIUM SERPL-SCNC: 136 MMOL/L — SIGNIFICANT CHANGE UP (ref 135–145)
WBC # BLD: 11.4 K/UL — HIGH (ref 3.8–10.5)
WBC # BLD: 15.6 K/UL — HIGH (ref 3.8–10.5)
WBC # FLD AUTO: 11.4 K/UL — HIGH (ref 3.8–10.5)
WBC # FLD AUTO: 15.6 K/UL — HIGH (ref 3.8–10.5)

## 2018-05-18 PROCEDURE — 99233 SBSQ HOSP IP/OBS HIGH 50: CPT

## 2018-05-18 PROCEDURE — 99222 1ST HOSP IP/OBS MODERATE 55: CPT

## 2018-05-18 PROCEDURE — 99291 CRITICAL CARE FIRST HOUR: CPT | Mod: 25

## 2018-05-18 PROCEDURE — 93308 TTE F-UP OR LMTD: CPT | Mod: 26

## 2018-05-18 RX ORDER — MIDODRINE HYDROCHLORIDE 2.5 MG/1
30 TABLET ORAL
Qty: 0 | Refills: 0 | Status: DISCONTINUED | OUTPATIENT
Start: 2018-05-18 | End: 2018-05-20

## 2018-05-18 RX ORDER — ACETAMINOPHEN 500 MG
650 TABLET ORAL ONCE
Qty: 0 | Refills: 0 | Status: COMPLETED | OUTPATIENT
Start: 2018-05-18 | End: 2018-05-18

## 2018-05-18 RX ORDER — ACETAMINOPHEN 500 MG
650 TABLET ORAL EVERY 6 HOURS
Qty: 0 | Refills: 0 | Status: DISCONTINUED | OUTPATIENT
Start: 2018-05-18 | End: 2018-05-22

## 2018-05-18 RX ADMIN — Medication 650 MILLIGRAM(S): at 03:30

## 2018-05-18 RX ADMIN — Medication 500 MICROGRAM(S): at 01:02

## 2018-05-18 RX ADMIN — Medication 650 MILLIGRAM(S): at 12:15

## 2018-05-18 RX ADMIN — PANTOPRAZOLE SODIUM 40 MILLIGRAM(S): 20 TABLET, DELAYED RELEASE ORAL at 10:26

## 2018-05-18 RX ADMIN — Medication 100 MILLIGRAM(S): at 21:00

## 2018-05-18 RX ADMIN — HEPARIN SODIUM 12 UNIT(S)/HR: 5000 INJECTION INTRAVENOUS; SUBCUTANEOUS at 21:02

## 2018-05-18 RX ADMIN — Medication 1 TABLET(S): at 11:44

## 2018-05-18 RX ADMIN — PHENYLEPHRINE HYDROCHLORIDE 3.64 MICROGRAM(S)/KG/MIN: 10 INJECTION INTRAVENOUS at 21:02

## 2018-05-18 RX ADMIN — MIDODRINE HYDROCHLORIDE 20 MILLIGRAM(S): 2.5 TABLET ORAL at 06:23

## 2018-05-18 RX ADMIN — MIDODRINE HYDROCHLORIDE 30 MILLIGRAM(S): 2.5 TABLET ORAL at 17:50

## 2018-05-18 RX ADMIN — MIDODRINE HYDROCHLORIDE 30 MILLIGRAM(S): 2.5 TABLET ORAL at 22:57

## 2018-05-18 RX ADMIN — Medication 100 MILLIGRAM(S): at 06:23

## 2018-05-18 RX ADMIN — Medication 650 MILLIGRAM(S): at 02:26

## 2018-05-18 RX ADMIN — Medication 500 MICROGRAM(S): at 19:56

## 2018-05-18 RX ADMIN — HEPARIN SODIUM 12 UNIT(S)/HR: 5000 INJECTION INTRAVENOUS; SUBCUTANEOUS at 22:58

## 2018-05-18 RX ADMIN — MIDODRINE HYDROCHLORIDE 30 MILLIGRAM(S): 2.5 TABLET ORAL at 12:19

## 2018-05-18 RX ADMIN — Medication 500 MICROGRAM(S): at 07:53

## 2018-05-18 RX ADMIN — Medication 650 MILLIGRAM(S): at 11:44

## 2018-05-18 RX ADMIN — Medication 100 MILLIGRAM(S): at 14:57

## 2018-05-18 NOTE — PROGRESS NOTE ADULT - PROBLEM SELECTOR PLAN 2
Stable. Would not initiate pulmonary vasodilators until she is off of systemic vasopressor support as I do not believe that her systemic hypotension is related to RV failure.

## 2018-05-18 NOTE — PROGRESS NOTE ADULT - SUBJECTIVE AND OBJECTIVE BOX
CHIEF COMPLAINT: 38 y/o F with hx of Pulmonary arterial hypertension who was admitted on 5/1 to medicine for SOB, unable to control her symptoms, transitioned to prostacyclin via IV and became hypotensive requiring vasopressors.     Interval Events: Complains of chest discomfort.     REVIEW OF SYSTEMS:  Constitutional: [x ] negative [ ] fevers [ ] chills [ ] weight loss [ ] weight gain  HEENT: [x ] negative [ ] dry eyes [ ] eye irritation [ ] postnasal drip [ ] nasal congestion  CV: [x ] negative  [ ] chest pain [ ] orthopnea [x ] palpitations [ ] murmur  Resp: [x ] negative [ ] cough [ ] shortness of breath [ ] dyspnea [ ] wheezing [ ] sputum [ ] hemoptysis  GI: [ x] negative [ ] nausea [ ] vomiting [ ] diarrhea [ ] constipation [ ] abd pain [ ] dysphagia   : [x ] negative [ ] dysuria [ ] nocturia [ ] hematuria [ ] increased urinary frequency  Musculoskeletal: [ ] negative [ ] back pain [ ] myalgias [ ] arthralgias [ ] fracture  Skin: [x ] negative [ ] rash [ ] itch  Neurological: [x ] negative [ ] headache [ ] dizziness [ ] syncope [ ] weakness [ ] numbness  Psychiatric: [x ] negative [ ] anxiety [ ] depression  Endocrine: [x ] negative [ ] diabetes [ ] thyroid problem  Hematologic/Lymphatic: [x ] negative [ ] anemia [ ] bleeding problem  Allergic/Immunologic: [x ] negative [ ] itchy eyes [ ] nasal discharge [ ] hives [ ] angioedema  [ ] All other systems negative  [ ] Unable to assess ROS because ________    OBJECTIVE:  ICU Vital Signs Last 24 Hrs  T(C): 36.7 (18 May 2018 08:00), Max: 37.2 (17 May 2018 16:00)  T(F): 98 (18 May 2018 08:00), Max: 99 (17 May 2018 16:00)  HR: 67 (18 May 2018 08:00) (54 - 99)  BP: 107/64 (18 May 2018 08:00) (76/51 - 143/69)  BP(mean): 80 (18 May 2018 08:00) (57 - 102)  ABP: --  ABP(mean): --  RR: 16 (18 May 2018 08:00) (10 - 32)  SpO2: 100% (18 May 2018 08:00) (91% - 100%)        05-17 @ 07:01  -  05-18 @ 07:00  --------------------------------------------------------  IN: 2958.6 mL / OUT: 2650 mL / NET: 308.6 mL    05-18 @ 07:01 - 05-18 @ 08:14  --------------------------------------------------------  IN: 42 mL / OUT: 350 mL / NET: -308 mL      CAPILLARY BLOOD GLUCOSE          PHYSICAL EXAM:  General: Comfortable, NAD  HEENT: EOMI, PERRLA  Lymph Nodes: no cervical  or clavicular adenopathy  Neck: no thyromegaly, midline trachea  Respiratory:  CTA b/l, no wheezing  Cardiovascular:  +S1S2, RRR  Abdomen: soft, NTND, bowel sounds present  Extremities: SHO, no joint erythema  Skin: dry, intact  Neurological: AAO x3, no focal deficits  Psychiatry: Appropriate mood and affect    LINES:    HOSPITAL MEDICATIONS:  Standing Meds:  benzonatate 100 milliGRAM(s) Oral three times a day  docusate sodium 100 milliGRAM(s) Oral two times a day  ferrous    sulfate 325 milliGRAM(s) Oral daily  heparin  Infusion 1200 Unit(s)/Hr IV Continuous <Continuous>  ipratropium    for Nebulization 500 MICROGram(s) Nebulizer every 6 hours  midodrine 20 milliGRAM(s) Oral every 8 hours  multivitamin 1 Tablet(s) Oral daily  norepinephrine Infusion 0.05 MICROgram(s)/kG/Min IV Continuous <Continuous>  pantoprazole    Tablet 40 milliGRAM(s) Oral before breakfast  phenylephrine    Infusion 0.1 MICROgram(s)/kG/Min IV Continuous <Continuous>  polyethylene glycol 3350 17 Gram(s) Oral two times a day  senna 2 Tablet(s) Oral at bedtime      PRN Meds:  acetaminophen   Tablet. 650 milliGRAM(s) Oral every 6 hours PRN  aluminum hydroxide/magnesium hydroxide/simethicone Suspension 30 milliLiter(s) Oral every 4 hours PRN  guaiFENesin    Syrup 100 milliGRAM(s) Oral every 6 hours PRN      LABS:                        9.8    15.6  )-----------( 277      ( 18 May 2018 00:52 )             31.5     Hgb Trend: 9.8<--, 9.9<--, 9.9<--, 9.7<--, 11.5<--  05-18    136  |  102  |  5<L>  ----------------------------<  92  3.8   |  21<L>  |  0.93    Ca    9.0      18 May 2018 00:52  Phos  2.9     05-18  Mg     1.8     05-18    TPro  6.4  /  Alb  3.7  /  TBili  0.4  /  DBili  x   /  AST  12  /  ALT  10  /  AlkPhos  33<L>  05-18    Creatinine Trend: 0.93<--, 0.96<--, 1.01<--, 0.95<--, 0.94<--, 1.05<--  PT/INR - ( 17 May 2018 00:32 )   PT: 12.5 sec;   INR: 1.14 ratio         PTT - ( 17 May 2018 22:46 )  PTT:60.5 sec          MICROBIOLOGY:     Culture - Blood (collected 16 May 2018 17:49)  Source: .Blood Blood-Peripheral  Preliminary Report (17 May 2018 18:01):    No growth to date.    Culture - Blood (collected 16 May 2018 05:47)  Source: .Blood Blood-Peripheral  Preliminary Report (17 May 2018 06:01):    No growth to date.      RADIOLOGY:  [ ] Reviewed and interpreted by me    EKG:

## 2018-05-18 NOTE — PROGRESS NOTE ADULT - ASSESSMENT
Patient is a 37 year-old female admitted to the MICU to institute Flolan therapy for baseline pulmonary HTN, patient subsequently developed hypotension 2/2 medication and was started on vasopressors. Patient at this time, followed by pulmonary team, goal is to take off vasopressor support and maintain prostacyclin, with improvement of function, CO and pulmonary compliance. Currently managed with heparin gtt for chronic PE. Will place swan acthy catheter to measure pressures directly to assess for treatment effectiveness.     #Neuro: no issues  -monitor MS  -OOB, PT    #Skin- right IJ now switched to [place]-Cathy  -S/p R. femoral triple lumen for additional access  -No acute issues    #GI  -Added lactulose, miralax, to titrate to bowel movements daily  -C/w pantoprazole 40mg daily  -Regular diet    #Renal: no active issues  -Trend I's+O's, S-Cr    #Heme-   -on heparin gtt for hx of chronic PE, continue per normogram  -monitor for bleeding  -hx of JULIA on ferrous sulfate, MV    #ID- no active issues, increasing WBC with no evidence of infection.   -Monitor Tmax and WBC    #CV: Hx Pulm HTN diagnosed in 2014, right heart cath on 5/5 with elevated right pressures; swan cathy catheterization at bedside to evaluate for PA pressures.   -[place] Cathy now removed, cordis in place. Will remove femoral line after peripheral IV placement.  -Wean off of phenylephrine as tolerated  -Continue with midodrine 20mg TID  -Monitor hemodynamics    #Pulm: Asthma / Pulm Htn/ PE (hx)  -Holding flolan and adempas at this time given persistent hypotension  -IVF resuscitation as tolerated  -monitor O2 sat, O2 supplementation via NC at nighttime  -On heparin gtt for known VTE    #VTE PPx:  -On heparin drip

## 2018-05-18 NOTE — PROGRESS NOTE ADULT - ATTENDING COMMENTS
Critically ill on pressors with PAH /unexplained vasoplegia  Frequent bedside visits with therapy change today. Crit Care Time Today 35 min +

## 2018-05-18 NOTE — CONSULT NOTE ADULT - ASSESSMENT
37F with asthma and pulmonary hypertension on O2 with hypotension of uncertain etiology with improved leukocytosis (19->15K) and negative BC.  rule out adrenal insufficiency - cortisol is pending.  No definite infection.  Exam non-focal.  Would like repeat bc and closely monitor off antibiotics.  Procalcitonin is <0.05 and now 0.07    Suggest:  - repeat BC x2  - observe off antibiotics  - CBC diff in am    above d/w Pulmonary and intensivist/housestaff

## 2018-05-18 NOTE — PROGRESS NOTE ADULT - PROBLEM SELECTOR PLAN 1
Continue to wean phenylephrine as tolerated. I suspect she is vasodilated, but the etiology is unclear. I would continue to monitor for evidence of overt infection and monitor the leukocytosis.

## 2018-05-18 NOTE — PROGRESS NOTE ADULT - ASSESSMENT
Ms. Conway is a 37 year old woman with WHO group I PAH with chronic RV systolic heart failure. She presented with syncope and symptomatic tachycardia with hypotension. She currently is on vasopressor support due to persistent hypotension, but has no evidence of low cardiac output on exam nor is she volume overloaded. Her echocardiogram is not consistent with worsening RV failure as the etiology of her hypotension. Following cessation of the Adempas, she is on less vasopressor support, but she remains on phenylephrine and midodrine. She has leukocytosis of unclear etiology.     Please call me with questions at [phone]. Recommendations discussed with MICU team and Dr. Yoon.

## 2018-05-18 NOTE — CONSULT NOTE ADULT - SUBJECTIVE AND OBJECTIVE BOX
Patient is a 37y old  Female who presents with a chief complaint of pulmonary artery HTN work up (07 May 2018 20:35)    HPI:  37F with known idiopathic pulmonary hypertension diagnosed 2014 per chart with features of Class I/III, GERD, asthma on home O2 2L at bedtime, chronic PE on xarelto, iron deficiency anemia admitted 5/1/18 for worsening SOZUA and an episode of syncope.  While here had extensive work up including R heart catheterization and echocardiogram.  Eventually she was admitted to MICU for initiation of flolan.  Course complicated by hypotension during infusion initially responsive to IVF, however, now requiring pressors (kennedi).  CXR negative.  Was on prednisone that was stopped 5/8/19.  5/9/18 - TTE done.  5/16/18, WBC reached 19K.  BCs sent and were negative.  Now 15K yesterday without left shift.  Continues to have hypotension requiring pressors.  Developed chest pain - possible pericarditis.  ID asked to evaluate.  Denies fever/chills.  No headache.  No n/v/d.  Constipated.  No abdominal pain.  No dysuria. ROS otherwise negative.    PAST MEDICAL & SURGICAL HISTORY:  PE (pulmonary thromboembolism): on coumadin  Asthma with status asthmaticus, unspecified asthma severity  Sinus tachycardia  Asthma  Pulmonary hypertension  Anemia: On home O2 at 2L via NC  History of tubal ligation    Allergies  No Known Allergies    ANTIMICROBIALS:  none    OTHER MEDS: MEDICATIONS  (STANDING):  acetaminophen   Tablet. 650 every 6 hours PRN  aluminum hydroxide/magnesium hydroxide/simethicone Suspension 30 every 4 hours PRN  benzonatate 100 three times a day  docusate sodium 100 two times a day  guaiFENesin    Syrup 100 every 6 hours PRN  heparin  Infusion 1200 <Continuous>  ipratropium    for Nebulization 500 every 6 hours  midodrine 30 <User Schedule>  norepinephrine Infusion 0.05 <Continuous>  pantoprazole    Tablet 40 before breakfast  phenylephrine    Infusion 0.1 <Continuous>  polyethylene glycol 3350 17 two times a day  senna 2 at bedtime    SOCIAL HISTORY:  ; non-smoker    FAMILY HISTORY:  Family history of diabetes mellitus in mother    REVIEW OF SYSTEMS  [  ] ROS unobtainable because:    [  ] All other systems negative except as noted below:	    Constitutional:  [ ] fever [ ] chills  [ ] weight loss  [ ] weakness  Skin:  [ ] rash [ ] phlebitis	  Eyes: [ ] icterus [ ] pain  [ ] discharge	  ENMT: [ ] sore throat  [ ] thrush [ ] ulcers [ ] exudates  Respiratory: [ ] dyspnea [ ] hemoptysis [ ] cough [ ] sputum	  Cardiovascular:  [ ] chest pain [ ] palpitations [ ] edema	  Gastrointestinal:  [ ] nausea [ ] vomiting [ ] diarrhea [ ] constipation [ ] pain	  Genitourinary:  [ ] dysuria [ ] frequency [ ] hematuria [ ] discharge [ ] flank pain  [ ] incontinence  Musculoskeletal:  [ ] myalgias [ ] arthralgias [ ] arthritis  [ ] back pain  Neurological:  [ ] headache [ ] seizures  [ ] confusion/altered mental status  Psychiatric:  [ ] anxiety [ ] depression	  Hematology/Lymphatics:  [ ] lymphadenopathy  Endocrine:  [ ] adrenal [ ] thyroid  Allergic/Immunologic:	 [ ] transplant [ ] seasonal    Vital Signs Last 24 Hrs  T(F): 98.5 (05-18-18 @ 12:00), Max: 99.5 (05-14-18 @ 23:00)    Vital Signs Last 24 Hrs  HR: 66 (05-18-18 @ 12:45) (54 - 112)  BP: 114/77 (05-18-18 @ 12:45) (76/51 - 143/69)  RR: 18 (05-18-18 @ 12:45)  SpO2: 92% (05-18-18 @ 12:45) (91% - 100%)  Wt(kg): --    PHYSICAL EXAM:  General: non-toxic  HEAD/EYES: anicteric, PERRL  ENT:  supple  Cardiovascular:   S1, S2  Respiratory:  clear bilaterally  GI:  soft, non-tender, normal bowel sounds  :  no CVA tenderness   Musculoskeletal:  no synovitis  Neurologic:  grossly non-focal  Skin:  no rash  Lymph: no lymphadenopathy  Psychiatric:  appropriate affect  Vascular:  no phlebitis                      9.8    15.6  )-----------( 277      ( 18 May 2018 00:52 )             31.5     136  |  102  |  5<L>  ----------------------------<  92  3.8   |  21<L>  |  0.93    Ca    9.0      18 May 2018 00:52  Phos  2.9     05-18  Mg     1.8     05-18    TPro  6.4  /  Alb  3.7  /  TBili  0.4  /  DBili  x   /  AST  12  /  ALT  10  /  AlkPhos  33<L>  05-18    MICROBIOLOGY:  Culture - Blood (collected 05-16-18 @ 17:49)  Source: .Blood Blood-Peripheral  Preliminary Report (05-17-18 @ 18:01):    No growth to date.    Culture - Blood (collected 05-16-18 @ 05:47)  Source: .Blood Blood-Peripheral  Preliminary Report (05-17-18 @ 06:01):    No growth to date.    RADIOLOGY:  CT Angio Chest w/ IV Cont (05.02.18 @ 21:09) >  IMPRESSION:  No evidence of pulmonary embolism.  Pulmonary hypertension with enlarged main pulmonary artery, right atrium and ventricle. There is thickening of the right ventricular free wall Interval resolution of previously seen airspace opacities, with residual scattered bilateral groundglass opacities in a mosaic pattern, likely secondary to pulmonary hypertension.

## 2018-05-18 NOTE — PROGRESS NOTE ADULT - SUBJECTIVE AND OBJECTIVE BOX
Subjective: She complains of central chest pressure, that is worse with inspiration. This started last night. She continues to have a cough, which is non-productive. She is constipated.     Medications:  acetaminophen   Tablet. 650 milliGRAM(s) Oral every 6 hours PRN  aluminum hydroxide/magnesium hydroxide/simethicone Suspension 30 milliLiter(s) Oral every 4 hours PRN  benzonatate 100 milliGRAM(s) Oral three times a day  docusate sodium 100 milliGRAM(s) Oral two times a day  ferrous    sulfate 325 milliGRAM(s) Oral daily  guaiFENesin    Syrup 100 milliGRAM(s) Oral every 6 hours PRN  heparin  Infusion 1200 Unit(s)/Hr IV Continuous <Continuous>  ipratropium    for Nebulization 500 MICROGram(s) Nebulizer every 6 hours  midodrine 20 milliGRAM(s) Oral every 8 hours  multivitamin 1 Tablet(s) Oral daily  norepinephrine Infusion 0.05 MICROgram(s)/kG/Min IV Continuous <Continuous>  pantoprazole    Tablet 40 milliGRAM(s) Oral before breakfast  phenylephrine    Infusion 0.1 MICROgram(s)/kG/Min IV Continuous <Continuous>  polyethylene glycol 3350 17 Gram(s) Oral two times a day  senna 2 Tablet(s) Oral at bedtime      Physical Exam:    Vitals:  T(C): 36.7 (18 @ 08:00), Max: 37.2 (18 @ 16:00)  HR: 67 (18 @ 08:00) (54 - 99)  BP: 107/64 (18 @ 08:00) (76/51 - 143/69)  BP(mean): 80 (18 @ 08:00) (57 - 102)  RR: 16 (18 @ 08:00) (10 - 32)  SpO2: 100% (18 @ 08:00) (91% - 100%)    Daily Weight in k.4 (18 May 2018 04:00)    I&O's Summary    17 May 2018 07:01  -  18 May 2018 07:00  --------------------------------------------------------  IN: 2958.6 mL / OUT: 2650 mL / NET: 308.6 mL    18 May 2018 07:01  -  18 May 2018 09:54  --------------------------------------------------------  IN: 42 mL / OUT: 350 mL / NET: -308 mL      General: No distress. Comfortable.  HEENT: EOM intact.  Neck: Neck supple. JVP not elevated. No masses  Chest: Clear to auscultation bilaterally  CV: RRR with normal S1 and S2 (P2 only slightly accentuated). Split S2 on inspiration.  No murmurs, rub, or gallops. Radial pulses normal.  Abdomen: Soft, non-distended, non-tender  Skin: No rashes or skin breakdown  Neurology: Alert and oriented times three. Sensation intact  Psych: Affect normal    Labs:                        9.8    15.6  )-----------( 277      ( 18 May 2018 00:52 )             31.5     -    136  |  102  |  5<L>  ----------------------------<  92  3.8   |  21<L>  |  0.93    Ca    9.0      18 May 2018 00:52  Phos  2.9       Mg     1.8         TPro  6.4  /  Alb  3.7  /  TBili  0.4  /  DBili  x   /  AST  12  /  ALT  10  /  AlkPhos  33<L>  18    PT/INR - ( 17 May 2018 00:32 )   PT: 12.5 sec;   INR: 1.14 ratio      PTT - ( 17 May 2018 22:46 )  PTT:60.5 sec

## 2018-05-18 NOTE — CHART NOTE - NSCHARTNOTEFT_GEN_A_CORE
: Deepa Hastings MD    INDICATION: pulmonary HTN    PROCEDURE:  [x] LIMITED ECHO  [ ] LIMITED CHEST  [ ] LIMITED RETROPERITONEAL  [ ] LIMITED ABDOMINAL  [ ] LIMITED DVT  [ ] NEEDLE GUIDANCE VASCULAR  [ ] NEEDLE GUIDANCE THORACENTESIS  [ ] NEEDLE GUIDANCE PARACENTESIS  [ ] NEEDLE GUIDANCE PERICARDIOCENTESIS  [ ] OTHER    FINDINGS:    Heart: Trace pericardial effusion. Normal LV function. RV not dilated.       INTERPRETATION:   Trace pericardial effusion

## 2018-05-19 LAB
ALBUMIN SERPL ELPH-MCNC: 3.3 G/DL — SIGNIFICANT CHANGE UP (ref 3.3–5)
ALP SERPL-CCNC: 32 U/L — LOW (ref 40–120)
ALT FLD-CCNC: 12 U/L — SIGNIFICANT CHANGE UP (ref 10–45)
ANION GAP SERPL CALC-SCNC: 10 MMOL/L — SIGNIFICANT CHANGE UP (ref 5–17)
APTT BLD: 39.2 SEC — HIGH (ref 27.5–37.4)
APTT BLD: 55.8 SEC — HIGH (ref 27.5–37.4)
APTT BLD: 72.6 SEC — HIGH (ref 27.5–37.4)
APTT BLD: > 200 SEC — CRITICAL HIGH (ref 27.5–37.4)
AST SERPL-CCNC: 11 U/L — SIGNIFICANT CHANGE UP (ref 10–40)
BASOPHILS # BLD AUTO: 0.1 K/UL — SIGNIFICANT CHANGE UP (ref 0–0.2)
BASOPHILS NFR BLD AUTO: 0.8 % — SIGNIFICANT CHANGE UP (ref 0–2)
BILIRUB SERPL-MCNC: 0.3 MG/DL — SIGNIFICANT CHANGE UP (ref 0.2–1.2)
BUN SERPL-MCNC: 7 MG/DL — SIGNIFICANT CHANGE UP (ref 7–23)
CALCIUM SERPL-MCNC: 8.8 MG/DL — SIGNIFICANT CHANGE UP (ref 8.4–10.5)
CHLORIDE SERPL-SCNC: 103 MMOL/L — SIGNIFICANT CHANGE UP (ref 96–108)
CO2 SERPL-SCNC: 22 MMOL/L — SIGNIFICANT CHANGE UP (ref 22–31)
CREAT SERPL-MCNC: 1.07 MG/DL — SIGNIFICANT CHANGE UP (ref 0.5–1.3)
DSDNA AB SER-ACNC: <12 IU/ML — SIGNIFICANT CHANGE UP
EOSINOPHIL # BLD AUTO: 0.5 K/UL — SIGNIFICANT CHANGE UP (ref 0–0.5)
EOSINOPHIL NFR BLD AUTO: 4.6 % — SIGNIFICANT CHANGE UP (ref 0–6)
GLUCOSE SERPL-MCNC: 88 MG/DL — SIGNIFICANT CHANGE UP (ref 70–99)
HCT VFR BLD CALC: 31.2 % — LOW (ref 34.5–45)
HGB BLD-MCNC: 9.7 G/DL — LOW (ref 11.5–15.5)
INR BLD: 1.11 RATIO — SIGNIFICANT CHANGE UP (ref 0.88–1.16)
INR BLD: 1.15 RATIO — SIGNIFICANT CHANGE UP (ref 0.88–1.16)
LYMPHOCYTES # BLD AUTO: 2.6 K/UL — SIGNIFICANT CHANGE UP (ref 1–3.3)
LYMPHOCYTES # BLD AUTO: 26.2 % — SIGNIFICANT CHANGE UP (ref 13–44)
MAGNESIUM SERPL-MCNC: 1.9 MG/DL — SIGNIFICANT CHANGE UP (ref 1.6–2.6)
MCHC RBC-ENTMCNC: 20.7 PG — LOW (ref 27–34)
MCHC RBC-ENTMCNC: 31.2 GM/DL — LOW (ref 32–36)
MCV RBC AUTO: 66.3 FL — LOW (ref 80–100)
MONOCYTES # BLD AUTO: 0.9 K/UL — SIGNIFICANT CHANGE UP (ref 0–0.9)
MONOCYTES NFR BLD AUTO: 8.9 % — SIGNIFICANT CHANGE UP (ref 2–14)
NEUTROPHILS # BLD AUTO: 5.9 K/UL — SIGNIFICANT CHANGE UP (ref 1.8–7.4)
NEUTROPHILS NFR BLD AUTO: 59.5 % — SIGNIFICANT CHANGE UP (ref 43–77)
PHOSPHATE SERPL-MCNC: 4 MG/DL — SIGNIFICANT CHANGE UP (ref 2.5–4.5)
PLATELET # BLD AUTO: 239 K/UL — SIGNIFICANT CHANGE UP (ref 150–400)
POTASSIUM SERPL-MCNC: 3.7 MMOL/L — SIGNIFICANT CHANGE UP (ref 3.5–5.3)
POTASSIUM SERPL-SCNC: 3.7 MMOL/L — SIGNIFICANT CHANGE UP (ref 3.5–5.3)
PROT SERPL-MCNC: 6.4 G/DL — SIGNIFICANT CHANGE UP (ref 6–8.3)
PROTHROM AB SERPL-ACNC: 12.1 SEC — SIGNIFICANT CHANGE UP (ref 9.8–12.7)
PROTHROM AB SERPL-ACNC: 12.5 SEC — SIGNIFICANT CHANGE UP (ref 9.8–12.7)
RBC # BLD: 4.71 M/UL — SIGNIFICANT CHANGE UP (ref 3.8–5.2)
RBC # FLD: 19 % — HIGH (ref 10.3–14.5)
SODIUM SERPL-SCNC: 135 MMOL/L — SIGNIFICANT CHANGE UP (ref 135–145)
WBC # BLD: 9.9 K/UL — SIGNIFICANT CHANGE UP (ref 3.8–10.5)
WBC # FLD AUTO: 9.9 K/UL — SIGNIFICANT CHANGE UP (ref 3.8–10.5)

## 2018-05-19 PROCEDURE — 99232 SBSQ HOSP IP/OBS MODERATE 35: CPT

## 2018-05-19 PROCEDURE — 99291 CRITICAL CARE FIRST HOUR: CPT

## 2018-05-19 RX ORDER — IPRATROPIUM BROMIDE 0.2 MG/ML
500 SOLUTION, NON-ORAL INHALATION EVERY 6 HOURS
Qty: 0 | Refills: 0 | Status: DISCONTINUED | OUTPATIENT
Start: 2018-05-19 | End: 2018-05-22

## 2018-05-19 RX ADMIN — Medication 100 MILLIGRAM(S): at 13:21

## 2018-05-19 RX ADMIN — Medication 100 MILLIGRAM(S): at 05:56

## 2018-05-19 RX ADMIN — MIDODRINE HYDROCHLORIDE 30 MILLIGRAM(S): 2.5 TABLET ORAL at 05:56

## 2018-05-19 RX ADMIN — PANTOPRAZOLE SODIUM 40 MILLIGRAM(S): 20 TABLET, DELAYED RELEASE ORAL at 10:20

## 2018-05-19 RX ADMIN — Medication 100 MILLIGRAM(S): at 21:08

## 2018-05-19 RX ADMIN — HEPARIN SODIUM 13 UNIT(S)/HR: 5000 INJECTION INTRAVENOUS; SUBCUTANEOUS at 04:00

## 2018-05-19 RX ADMIN — Medication 500 MICROGRAM(S): at 15:37

## 2018-05-19 RX ADMIN — Medication 500 MICROGRAM(S): at 01:09

## 2018-05-19 RX ADMIN — Medication 1 TABLET(S): at 11:56

## 2018-05-19 RX ADMIN — MIDODRINE HYDROCHLORIDE 30 MILLIGRAM(S): 2.5 TABLET ORAL at 17:49

## 2018-05-19 RX ADMIN — MIDODRINE HYDROCHLORIDE 30 MILLIGRAM(S): 2.5 TABLET ORAL at 11:56

## 2018-05-19 RX ADMIN — PHENYLEPHRINE HYDROCHLORIDE 3.64 MICROGRAM(S)/KG/MIN: 10 INJECTION INTRAVENOUS at 21:10

## 2018-05-19 RX ADMIN — HEPARIN SODIUM 14 UNIT(S)/HR: 5000 INJECTION INTRAVENOUS; SUBCUTANEOUS at 21:13

## 2018-05-19 NOTE — PROGRESS NOTE ADULT - ATTENDING COMMENTS
1.Hypotension of unclear etiology. Flolan off.  cultures negative to date. Pt remains pressor dependent. If all cx negative will start steroids.  2. Pulmonary HTN: unclear etiology.  H/o of CTEP. On heparin. [facility] vascular screen pending.    cc time 35 min

## 2018-05-19 NOTE — PROGRESS NOTE ADULT - ASSESSMENT
Patient is a 37 year-old female admitted to the MICU to institute Flolan therapy for baseline pulmonary HTN, patient subsequently developed hypotension 2/2 medication and was started on vasopressors. Patient at this time, followed by pulmonary team, goal is to take off vasopressor support and maintain prostacyclin, with improvement of function, CO and pulmonary compliance. Currently managed with heparin gtt for chronic PE. Will place swan cathy catheter to measure pressures directly to assess for treatment effectiveness.     #Neuro: no issues  -monitor MS  -OOB, PT    #Skin-   -Dry, intact  -No acute issues    #GI  -Continue lactulose, miralax, to titrate to bowel movements daily  -C/w pantoprazole 40mg daily  -Regular diet    #Renal: no active issues  -Trend I's+O's, S-Cr    #Heme-   -on heparin gtt for hx of chronic PE, continue per normogram  -monitor for bleeding  -hx of JULIA on ferrous sulfate, MV    #ID- no active issues, increasing WBC with no evidence of infection.   -Monitor Tmax and WBC  -ID consulted for leukocytosis  -Check ESR, CRP  -Follow up repeat blood culture    #CV: Hx Pulm HTN diagnosed in 2014, right heart cath on 5/5 with elevated right pressures; swan cathy catheterization at bedside to evaluate for PA pressures.   -[place] Cathy now removed, cordis in place. Will remove femoral line after peripheral IV placement.  -Wean off of phenylephrine as tolerated  -Increased midodrine 30mg every 6 hrs  -Wean off of phenylephrine  -Monitor hemodynamics  -R. IJ cordis and R> femoral lines removed    #Pulm: Asthma / Pulm Htn/ PE (hx)  -Holding flolan , aldactone and adempas at this time given persistent hypotension  -IVF resuscitation as tolerated  -monitor O2 sat, O2 supplementation via NC at nighttime  -On heparin gtt for known VTE    #VTE PPx:  -On heparin drip

## 2018-05-19 NOTE — PROGRESS NOTE ADULT - SUBJECTIVE AND OBJECTIVE BOX
CHIEF COMPLAINT: 38 y/o F with hx of Pulmonary arterial hypertension who was admitted on 5/1 to medicine for SOB, unable to control her symptoms, transitioned to prostacyclin via IV and became hypotensive requiring vasopressors.     Interval Events:  Complains of chest discomfort.    REVIEW OF SYSTEMS:  Constitutional: [x ] negative [ ] fevers [ ] chills [ ] weight loss [ ] weight gain  HEENT: [x ] negative [ ] dry eyes [ ] eye irritation [ ] postnasal drip [ ] nasal congestion  CV: [x ] negative  [ ] chest pain [ ] orthopnea [x ] palpitations [ ] murmur  Resp: [x ] negative [ ] cough [ ] shortness of breath [ ] dyspnea [ ] wheezing [ ] sputum [ ] hemoptysis  GI: [ x] negative [ ] nausea [ ] vomiting [ ] diarrhea [ ] constipation [ ] abd pain [ ] dysphagia   : [x ] negative [ ] dysuria [ ] nocturia [ ] hematuria [ ] increased urinary frequency  Musculoskeletal: [ ] negative [ ] back pain [ ] myalgias [ ] arthralgias [ ] fracture  Skin: [x ] negative [ ] rash [ ] itch  Neurological: [x ] negative [ ] headache [ ] dizziness [ ] syncope [ ] weakness [ ] numbness  Psychiatric: [x ] negative [ ] anxiety [ ] depression  Endocrine: [x ] negative [ ] diabetes [ ] thyroid problem  Hematologic/Lymphatic: [x ] negative [ ] anemia [ ] bleeding problem  Allergic/Immunologic: [x ] negative [ ] itchy eyes [ ] nasal discharge [ ] hives [ ] angioedema  [ ] All other systems negative  [ ] Unable to assess ROS because ________    OBJECTIVE:  ICU Vital Signs Last 24 Hrs  T(C): 36.8 (19 May 2018 04:00), Max: 37.7 (18 May 2018 19:45)  T(F): 98.3 (19 May 2018 04:00), Max: 99.8 (18 May 2018 19:45)  HR: 58 (19 May 2018 07:00) (55 - 112)  BP: 94/57 (19 May 2018 07:00) (81/50 - 130/81)  BP(mean): 70 (19 May 2018 07:00) (59 - 98)  ABP: --  ABP(mean): --  RR: 17 (19 May 2018 07:00) (12 - 31)  SpO2: 100% (19 May 2018 07:00) (90% - 100%)        05-18 @ 07:01  -  05-19 @ 07:00  --------------------------------------------------------  IN: 1379.2 mL / OUT: 1500 mL / NET: -120.8 mL      CAPILLARY BLOOD GLUCOSE          PHYSICAL EXAM:  General: Comfortable, NAD  HEENT: EOMI, PERRLA  Lymph Nodes: no cervical  or clavicular adenopathy  Neck: no thyromegaly, midline trachea  Respiratory:  CTA b/l, no wheezing  Cardiovascular:  +S1S2, RRR  Abdomen: soft, NTND, bowel sounds present  Extremities: SHO, no joint erythema  Skin: dry, intact  Neurological: AAO x3, no focal deficits  Psychiatry: Appropriate mood and affect    LINES:    HOSPITAL MEDICATIONS:  Standing Meds:  benzonatate 100 milliGRAM(s) Oral three times a day  docusate sodium 100 milliGRAM(s) Oral two times a day  ferrous    sulfate 325 milliGRAM(s) Oral daily  heparin  Infusion 1200 Unit(s)/Hr IV Continuous <Continuous>  ipratropium    for Nebulization 500 MICROGram(s) Nebulizer every 6 hours  midodrine 30 milliGRAM(s) Oral <User Schedule>  multivitamin 1 Tablet(s) Oral daily  norepinephrine Infusion 0.05 MICROgram(s)/kG/Min IV Continuous <Continuous>  pantoprazole    Tablet 40 milliGRAM(s) Oral before breakfast  phenylephrine    Infusion 0.1 MICROgram(s)/kG/Min IV Continuous <Continuous>  polyethylene glycol 3350 17 Gram(s) Oral two times a day  senna 2 Tablet(s) Oral at bedtime      PRN Meds:  acetaminophen   Tablet. 650 milliGRAM(s) Oral every 6 hours PRN  aluminum hydroxide/magnesium hydroxide/simethicone Suspension 30 milliLiter(s) Oral every 4 hours PRN  guaiFENesin    Syrup 100 milliGRAM(s) Oral every 6 hours PRN      LABS:                        9.7    9.9   )-----------( 239      ( 19 May 2018 03:24 )             31.2     Hgb Trend: 9.7<--, 9.6<--, 9.8<--, 9.9<--, 9.9<--  05-19    135  |  103  |  7   ----------------------------<  88  3.7   |  22  |  1.07    Ca    8.8      19 May 2018 03:23  Phos  4.0     05-19  Mg     1.9     05-19    TPro  6.4  /  Alb  3.3  /  TBili  0.3  /  DBili  x   /  AST  11  /  ALT  12  /  AlkPhos  32<L>  05-19    Creatinine Trend: 1.07<--, 0.93<--, 0.96<--, 1.01<--, 0.95<--, 0.94<--  PT/INR - ( 19 May 2018 03:24 )   PT: 12.1 sec;   INR: 1.11 ratio         PTT - ( 19 May 2018 03:24 )  PTT:55.8 sec          MICROBIOLOGY:     Culture - Blood (collected 16 May 2018 17:49)  Source: .Blood Blood-Peripheral  Preliminary Report (17 May 2018 18:01):    No growth to date.      RADIOLOGY:  [ ] Reviewed and interpreted by me    EKG:

## 2018-05-20 LAB
ALBUMIN SERPL ELPH-MCNC: 3.9 G/DL — SIGNIFICANT CHANGE UP (ref 3.3–5)
ALP SERPL-CCNC: 35 U/L — LOW (ref 40–120)
ALT FLD-CCNC: 14 U/L — SIGNIFICANT CHANGE UP (ref 10–45)
ANION GAP SERPL CALC-SCNC: 17 MMOL/L — SIGNIFICANT CHANGE UP (ref 5–17)
APTT BLD: 75.3 SEC — HIGH (ref 27.5–37.4)
AST SERPL-CCNC: 12 U/L — SIGNIFICANT CHANGE UP (ref 10–40)
BILIRUB SERPL-MCNC: 0.3 MG/DL — SIGNIFICANT CHANGE UP (ref 0.2–1.2)
BUN SERPL-MCNC: 8 MG/DL — SIGNIFICANT CHANGE UP (ref 7–23)
CALCIUM SERPL-MCNC: 9.4 MG/DL — SIGNIFICANT CHANGE UP (ref 8.4–10.5)
CHLORIDE SERPL-SCNC: 102 MMOL/L — SIGNIFICANT CHANGE UP (ref 96–108)
CO2 SERPL-SCNC: 20 MMOL/L — LOW (ref 22–31)
CREAT ?TM UR-MCNC: 201 MG/DL — SIGNIFICANT CHANGE UP
CREAT SERPL-MCNC: 1.08 MG/DL — SIGNIFICANT CHANGE UP (ref 0.5–1.3)
GLUCOSE SERPL-MCNC: 86 MG/DL — SIGNIFICANT CHANGE UP (ref 70–99)
HCT VFR BLD CALC: 31.4 % — LOW (ref 34.5–45)
HGB BLD-MCNC: 9.9 G/DL — LOW (ref 11.5–15.5)
MAGNESIUM SERPL-MCNC: 1.8 MG/DL — SIGNIFICANT CHANGE UP (ref 1.6–2.6)
MCHC RBC-ENTMCNC: 20.8 PG — LOW (ref 27–34)
MCHC RBC-ENTMCNC: 31.6 GM/DL — LOW (ref 32–36)
MCV RBC AUTO: 66 FL — LOW (ref 80–100)
OSMOLALITY UR: 555 MOS/KG — SIGNIFICANT CHANGE UP (ref 300–900)
PHOSPHATE SERPL-MCNC: 4.2 MG/DL — SIGNIFICANT CHANGE UP (ref 2.5–4.5)
PLATELET # BLD AUTO: 275 K/UL — SIGNIFICANT CHANGE UP (ref 150–400)
POTASSIUM SERPL-MCNC: 3.7 MMOL/L — SIGNIFICANT CHANGE UP (ref 3.5–5.3)
POTASSIUM SERPL-SCNC: 3.7 MMOL/L — SIGNIFICANT CHANGE UP (ref 3.5–5.3)
PROT ?TM UR-MCNC: 13 MG/DL — HIGH (ref 0–12)
PROT SERPL-MCNC: 6.4 G/DL — SIGNIFICANT CHANGE UP (ref 6–8.3)
PROT/CREAT UR-RTO: 0.1 RATIO — SIGNIFICANT CHANGE UP (ref 0–0.2)
RBC # BLD: 4.76 M/UL — SIGNIFICANT CHANGE UP (ref 3.8–5.2)
RBC # FLD: 18.8 % — HIGH (ref 10.3–14.5)
SODIUM SERPL-SCNC: 139 MMOL/L — SIGNIFICANT CHANGE UP (ref 135–145)
SODIUM UR-SCNC: 85 MMOL/L — SIGNIFICANT CHANGE UP
UUN UR-MCNC: 835 MG/DL — SIGNIFICANT CHANGE UP
WBC # BLD: 10.5 K/UL — SIGNIFICANT CHANGE UP (ref 3.8–10.5)
WBC # FLD AUTO: 10.5 K/UL — SIGNIFICANT CHANGE UP (ref 3.8–10.5)

## 2018-05-20 PROCEDURE — 99291 CRITICAL CARE FIRST HOUR: CPT

## 2018-05-20 RX ORDER — MIDODRINE HYDROCHLORIDE 2.5 MG/1
30 TABLET ORAL EVERY 8 HOURS
Qty: 0 | Refills: 0 | Status: DISCONTINUED | OUTPATIENT
Start: 2018-05-20 | End: 2018-05-20

## 2018-05-20 RX ORDER — MIDODRINE HYDROCHLORIDE 2.5 MG/1
20 TABLET ORAL EVERY 8 HOURS
Qty: 0 | Refills: 0 | Status: DISCONTINUED | OUTPATIENT
Start: 2018-05-20 | End: 2018-05-21

## 2018-05-20 RX ORDER — SODIUM CHLORIDE 9 MG/ML
500 INJECTION INTRAMUSCULAR; INTRAVENOUS; SUBCUTANEOUS ONCE
Qty: 0 | Refills: 0 | Status: COMPLETED | OUTPATIENT
Start: 2018-05-20 | End: 2018-05-20

## 2018-05-20 RX ADMIN — MIDODRINE HYDROCHLORIDE 20 MILLIGRAM(S): 2.5 TABLET ORAL at 13:05

## 2018-05-20 RX ADMIN — PANTOPRAZOLE SODIUM 40 MILLIGRAM(S): 20 TABLET, DELAYED RELEASE ORAL at 08:30

## 2018-05-20 RX ADMIN — Medication 20 MILLIGRAM(S): at 21:34

## 2018-05-20 RX ADMIN — Medication 100 MILLIGRAM(S): at 13:05

## 2018-05-20 RX ADMIN — Medication 20 MILLIGRAM(S): at 13:05

## 2018-05-20 RX ADMIN — SODIUM CHLORIDE 500 MILLILITER(S): 9 INJECTION INTRAMUSCULAR; INTRAVENOUS; SUBCUTANEOUS at 15:42

## 2018-05-20 RX ADMIN — MIDODRINE HYDROCHLORIDE 20 MILLIGRAM(S): 2.5 TABLET ORAL at 21:34

## 2018-05-20 RX ADMIN — Medication 100 MILLIGRAM(S): at 06:48

## 2018-05-20 RX ADMIN — Medication 100 MILLIGRAM(S): at 22:36

## 2018-05-20 RX ADMIN — Medication 1 TABLET(S): at 11:59

## 2018-05-20 RX ADMIN — MIDODRINE HYDROCHLORIDE 30 MILLIGRAM(S): 2.5 TABLET ORAL at 06:47

## 2018-05-20 RX ADMIN — MIDODRINE HYDROCHLORIDE 30 MILLIGRAM(S): 2.5 TABLET ORAL at 00:40

## 2018-05-20 NOTE — PROGRESS NOTE ADULT - ASSESSMENT
Patient is a 37 year-old female admitted to the MICU to institute Flolan therapy for baseline pulmonary HTN, patient subsequently developed hypotension 2/2 medication and was started on vasopressors. Currently off od adempas, aldactone and flolan. Managed with heparin gtt for chronic PE, currently on high dose midodrine and being weaned off phenylphrine.   #Neuro: no issues  -monitor MS  -OOB, PT    #Skin-   -Dry, intact  -No acute issues    #GI  -Continue lactulose, miralax, to titrate to bowel movements daily  -C/w pantoprazole 40mg daily  -Regular diet    #Renal: no active issues  -Trend I's+O's, S-Cr    #Heme-   -on heparin gtt for hx of chronic PE, continue per normogram  -monitor for bleeding  -hx of JULIA on ferrous sulfate, MV    #ID- no active issues, increasing WBC with no evidence of infection.   -Monitor Tmax and WBC  -ID consulted for leukocytosis  -Check ESR, CRP  -Follow up repeat blood culture    #CV: Hx Pulm HTN diagnosed in 2014, right heart cath on 5/5 with elevated right pressures; swan cathy catheterization at bedside to evaluate for PA pressures.   -[place] Cathy now removed, cordis in place. Will remove femoral line after peripheral IV placement.  -Wean off of phenylephrine as tolerated  -Increased midodrine 30mg every 6 hrs  -Wean off of phenylephrine  -Monitor hemodynamics  -R. IJ cordis and R> femoral lines removed    #Pulm: Asthma / Pulm Htn/ PE (hx)  -Holding flolan , aldactone and adempas at this time given persistent hypotension  -IVF resuscitation as tolerated  -monitor O2 sat, O2 supplementation via NC at nighttime  -On heparin gtt for known VTE    #VTE PPx:  -On heparin drip Patient is a 37 year-old female admitted to the MICU to institute Flolan therapy for baseline pulmonary HTN, patient subsequently developed hypotension 2/2 medication and was started on vasopressors. Currently off od adempas, aldactone and flolan. Managed with heparin gtt for chronic PE, currently on high dose midodrine and being weaned off phenylphrine.   #Neuro: no issues  -monitor MS  -OOB, PT    #Skin-   -Dry, intact  -No acute issues    #GI  -Continue lactulose, miralax, to titrate to bowel movements daily  -C/w pantoprazole 40mg daily  -Regular diet    #Renal: no active issues  -Trend I's+O's, S-Cr    #Heme-   -on heparin gtt for hx of chronic PE, continue per normogram  -monitor for bleeding  -hx of JULIA on ferrous sulfate, MV    #ID- no active issues, increasing WBC with no evidence of infection.   -Monitor Tmax and WBC  -ID consulted for leukocytosis  -Check ESR, CRP  -Follow up repeat blood culture    #CV: Hx Pulm HTN diagnosed in 2014, right heart cath on 5/5 with elevated right pressures; swan cathy catheterization at bedside to evaluate for PA pressures.   -[place] Cathy now removed, cordis in place. Will remove femoral line after peripheral IV placement.  -Wean off of phenylephrine as tolerated  -Decreased midodrine 20mg every 8hrs  -Given negative blood cultures, will solumedrol 20mg every 8 hrs  -Wean off of phenylephrine  -Monitor hemodynamics    #Pulm: Asthma / Pulm Htn/ PE (hx)  -Holding flolan , aldactone and adempas at this time given persistent hypotension  -IVF resuscitation as tolerated  -monitor O2 sat, O2 supplementation via NC at nighttime  -On heparin gtt for known VTE    #VTE PPx:  -On heparin drip

## 2018-05-20 NOTE — PROGRESS NOTE ADULT - ATTENDING COMMENTS
1.Pulmonary HTN  from CTEP and ? other etiology. Pt remains hypotensive and requiring iv and po pressors. Decrease midodrine to 20 mg q 8 hrs. Bradycardia.   2.  All cx negative to date . Will  give Solu-medrol 20 mg q 8 hrs.      cc time 35 min

## 2018-05-20 NOTE — PHYSICAL THERAPY INITIAL EVALUATION ADULT - PRECAUTIONS/LIMITATIONS, REHAB EVAL
no bowel/bladder incontinence/tongue biting/fasciculations, no post-ictal state, not related to change in position, but did vomit and stated that her whole body was shaking as per her .  She went to [facility] in [place] for evaluation where she states that her work up was negative; but after contacting Dr. Yoon, he advised her to come into the hospital for further work up.  Pt states that she has dyspnea at rest, but that it worsens with ambulation.  More importantly, she feels quite tachycardic when she walks and endorses significant anxiety about doing anything because she's afraid of her tachycardia, stating that she feels it's like a panic attack in some way, which exacerbates her underlying heart rate. With respect to her asthma, she has a hx of chronic asthma with triggers known to be changes in weather, allergies, and any URI Sxs.  She does not wake up with an asthma attack, and her frequency of Atrovent inhaler use varies.  As of late, since she has had increased phlegm production and hoarseness.  Last chest CT in 2016 showed enlarged main pulmonary artery w/ patchy bilateral ground glass opacities; last Echo in 2016 showed normal LV dimensions, hyperdynamic LV, flattening of IVS in systole/diastole c/w RV pressure overload, decreased RV systolic function w/ EF 82%.  CTA chest: No evidence of pulmonary embolism.Pulmonary hypertension with enlarged main pulmonary artery, right atrium and ventricle. There is thickening of the right ventricular free wall.  ECHO: Left ventricular cavity size appears small. Normal overall left ventricular systolic function with an estimated ejection fraction of 70%. Normal diastolic function. Right ventricular enlargement with decreased right ventricular systolic function.Flattening of the interventricular septum in systole consistent with right ventricular pressure overload.

## 2018-05-20 NOTE — PROGRESS NOTE ADULT - SUBJECTIVE AND OBJECTIVE BOX
CHIEF COMPLAINT: 36 y/o F with hx of Pulmonary arterial hypertension who was admitted on 5/1 to medicine for SOB, unable to control her symptoms, transitioned to prostacyclin via IV and became hypotensive requiring vasopressors. Aldactone and adempas been on hold. No flolan trials.    Interval Events: Bradycardic overnight.    REVIEW OF SYSTEMS:  Constitutional: [x ] negative [ ] fevers [ ] chills [ ] weight loss [ ] weight gain  HEENT: [x ] negative [ ] dry eyes [ ] eye irritation [ ] postnasal drip [ ] nasal congestion  CV: [ ] negative  [ ] chest pain [ ] orthopnea [x ] palpitations [ ] murmur  Resp: [x ] negative [ ] cough [ ] shortness of breath [ ] dyspnea [ ] wheezing [ ] sputum [ ] hemoptysis  GI: [ x] negative [ ] nausea [ ] vomiting [ ] diarrhea [ ] constipation [ ] abd pain [ ] dysphagia   : [x ] negative [ ] dysuria [ ] nocturia [ ] hematuria [ ] increased urinary frequency  Musculoskeletal: [x ] negative [ ] back pain [ ] myalgias [ ] arthralgias [ ] fracture  Skin: [x ] negative [ ] rash [ ] itch  Neurological: [x ] negative [ ] headache [ ] dizziness [ ] syncope [ ] weakness [ ] numbness  Psychiatric: [x ] negative [ ] anxiety [ ] depression  Endocrine: [x ] negative [ ] diabetes [ ] thyroid problem  Hematologic/Lymphatic: [x ] negative [ ] anemia [ ] bleeding problem  Allergic/Immunologic: [x ] negative [ ] itchy eyes [ ] nasal discharge [ ] hives [ ] angioedema  [ ] All other systems negative  [ ] Unable to assess ROS because ________    OBJECTIVE:  ICU Vital Signs Last 24 Hrs  T(C): 37.2 (20 May 2018 04:00), Max: 37.2 (20 May 2018 00:00)  T(F): 99 (20 May 2018 04:00), Max: 99 (20 May 2018 00:00)  HR: 55 (20 May 2018 07:00) (50 - 103)  BP: 99/56 (20 May 2018 07:00) (75/62 - 144/65)  BP(mean): 68 (20 May 2018 07:00) (57 - 103)  ABP: --  ABP(mean): --  RR: 17 (20 May 2018 07:00) (14 - 38)  SpO2: 99% (20 May 2018 07:00) (92% - 100%)        05-19 @ 07:01  -  05-20 @ 07:00  --------------------------------------------------------  IN: 801.4 mL / OUT: 625 mL / NET: 176.4 mL      CAPILLARY BLOOD GLUCOSE          PHYSICAL EXAM:  General: Comfortable, NAD  HEENT: EOMI, PERRLA  Lymph Nodes: no cervical  or clavicular adenopathy  Neck: no thyromegaly, midline trachea  Respiratory:  CTA b/l, no wheezing  Cardiovascular:  +S1S2, RRR  Abdomen: soft, NTND, bowel sounds present  Extremities: SHO, no joint erythema  Skin: dry, intact  Neurological: AAO x3, no focal deficits  Psychiatry: Appropriate mood and affect    LINES:    HOSPITAL MEDICATIONS:  Standing Meds:  benzonatate 100 milliGRAM(s) Oral three times a day  docusate sodium 100 milliGRAM(s) Oral two times a day  ferrous    sulfate 325 milliGRAM(s) Oral daily  heparin  Infusion 1200 Unit(s)/Hr IV Continuous <Continuous>  midodrine 30 milliGRAM(s) Oral <User Schedule>  multivitamin 1 Tablet(s) Oral daily  norepinephrine Infusion 0.05 MICROgram(s)/kG/Min IV Continuous <Continuous>  pantoprazole    Tablet 40 milliGRAM(s) Oral before breakfast  phenylephrine    Infusion 0.1 MICROgram(s)/kG/Min IV Continuous <Continuous>  polyethylene glycol 3350 17 Gram(s) Oral two times a day  senna 2 Tablet(s) Oral at bedtime      PRN Meds:  acetaminophen   Tablet. 650 milliGRAM(s) Oral every 6 hours PRN  aluminum hydroxide/magnesium hydroxide/simethicone Suspension 30 milliLiter(s) Oral every 4 hours PRN  guaiFENesin    Syrup 100 milliGRAM(s) Oral every 6 hours PRN  ipratropium    for Nebulization 500 MICROGram(s) Nebulizer every 6 hours PRN      LABS:                        9.9    10.5  )-----------( 275      ( 20 May 2018 02:53 )             31.4     Hgb Trend: 9.9<--, 9.7<--, 9.6<--, 9.8<--, 9.9<--  05-20    139  |  102  |  8   ----------------------------<  86  3.7   |  20<L>  |  1.08    Ca    9.4      20 May 2018 02:53  Phos  4.2     05-20  Mg     1.8     05-20    TPro  6.4  /  Alb  3.9  /  TBili  0.3  /  DBili  x   /  AST  12  /  ALT  14  /  AlkPhos  35<L>  05-20    Creatinine Trend: 1.08<--, 1.07<--, 0.93<--, 0.96<--, 1.01<--, 0.95<--  PT/INR - ( 19 May 2018 20:57 )   PT: 12.5 sec;   INR: 1.15 ratio         PTT - ( 20 May 2018 02:53 )  PTT:75.3 sec          MICROBIOLOGY:     Culture - Blood (collected 18 May 2018 20:34)  Source: .Blood Blood-Peripheral  Preliminary Report (19 May 2018 21:01):    No growth to date.    Culture - Blood (collected 18 May 2018 20:34)  Source: .Blood Blood-Venous  Preliminary Report (19 May 2018 21:01):    No growth to date.      RADIOLOGY:  [ ] Reviewed and interpreted by me    EKG:

## 2018-05-21 LAB
ALBUMIN SERPL ELPH-MCNC: 4.2 G/DL — SIGNIFICANT CHANGE UP (ref 3.3–5)
ALP SERPL-CCNC: 38 U/L — LOW (ref 40–120)
ALT FLD-CCNC: 16 U/L — SIGNIFICANT CHANGE UP (ref 10–45)
ANA TITR SER: NEGATIVE — SIGNIFICANT CHANGE UP
ANION GAP SERPL CALC-SCNC: 15 MMOL/L — SIGNIFICANT CHANGE UP (ref 5–17)
APTT BLD: 46.5 SEC — HIGH (ref 27.5–37.4)
APTT BLD: 62.5 SEC — HIGH (ref 27.5–37.4)
APTT BLD: 71.2 SEC — HIGH (ref 27.5–37.4)
APTT BLD: 97.4 SEC — HIGH (ref 27.5–37.4)
AST SERPL-CCNC: 15 U/L — SIGNIFICANT CHANGE UP (ref 10–40)
BILIRUB SERPL-MCNC: 0.3 MG/DL — SIGNIFICANT CHANGE UP (ref 0.2–1.2)
BUN SERPL-MCNC: 9 MG/DL — SIGNIFICANT CHANGE UP (ref 7–23)
CALCIUM SERPL-MCNC: 9.7 MG/DL — SIGNIFICANT CHANGE UP (ref 8.4–10.5)
CHLORIDE SERPL-SCNC: 102 MMOL/L — SIGNIFICANT CHANGE UP (ref 96–108)
CO2 SERPL-SCNC: 21 MMOL/L — LOW (ref 22–31)
CREAT SERPL-MCNC: 0.83 MG/DL — SIGNIFICANT CHANGE UP (ref 0.5–1.3)
CULTURE RESULTS: SIGNIFICANT CHANGE UP
CULTURE RESULTS: SIGNIFICANT CHANGE UP
GLUCOSE SERPL-MCNC: 132 MG/DL — HIGH (ref 70–99)
HCT VFR BLD CALC: 30.9 % — LOW (ref 34.5–45)
HCT VFR BLD CALC: 31.3 % — LOW (ref 34.5–45)
HGB BLD-MCNC: 9.6 G/DL — LOW (ref 11.5–15.5)
HGB BLD-MCNC: 9.9 G/DL — LOW (ref 11.5–15.5)
INR BLD: 1.12 RATIO — SIGNIFICANT CHANGE UP (ref 0.88–1.16)
MAGNESIUM SERPL-MCNC: 1.9 MG/DL — SIGNIFICANT CHANGE UP (ref 1.6–2.6)
MCHC RBC-ENTMCNC: 20 PG — LOW (ref 27–34)
MCHC RBC-ENTMCNC: 20.9 PG — LOW (ref 27–34)
MCHC RBC-ENTMCNC: 30.6 GM/DL — LOW (ref 32–36)
MCHC RBC-ENTMCNC: 31.9 GM/DL — LOW (ref 32–36)
MCV RBC AUTO: 65.2 FL — LOW (ref 80–100)
MCV RBC AUTO: 65.4 FL — LOW (ref 80–100)
PHOSPHATE SERPL-MCNC: 3.5 MG/DL — SIGNIFICANT CHANGE UP (ref 2.5–4.5)
PLATELET # BLD AUTO: 327 K/UL — SIGNIFICANT CHANGE UP (ref 150–400)
PLATELET # BLD AUTO: 344 K/UL — SIGNIFICANT CHANGE UP (ref 150–400)
POTASSIUM SERPL-MCNC: 4.3 MMOL/L — SIGNIFICANT CHANGE UP (ref 3.5–5.3)
POTASSIUM SERPL-SCNC: 4.3 MMOL/L — SIGNIFICANT CHANGE UP (ref 3.5–5.3)
PROT SERPL-MCNC: 7.3 G/DL — SIGNIFICANT CHANGE UP (ref 6–8.3)
PROTHROM AB SERPL-ACNC: 12.2 SEC — SIGNIFICANT CHANGE UP (ref 9.8–12.7)
RBC # BLD: 4.73 M/UL — SIGNIFICANT CHANGE UP (ref 3.8–5.2)
RBC # BLD: 4.8 M/UL — SIGNIFICANT CHANGE UP (ref 3.8–5.2)
RBC # FLD: 18.6 % — HIGH (ref 10.3–14.5)
RBC # FLD: 18.9 % — HIGH (ref 10.3–14.5)
SODIUM SERPL-SCNC: 138 MMOL/L — SIGNIFICANT CHANGE UP (ref 135–145)
SPECIMEN SOURCE: SIGNIFICANT CHANGE UP
SPECIMEN SOURCE: SIGNIFICANT CHANGE UP
WBC # BLD: 7.7 K/UL — SIGNIFICANT CHANGE UP (ref 3.8–10.5)
WBC # BLD: 8.6 K/UL — SIGNIFICANT CHANGE UP (ref 3.8–10.5)
WBC # FLD AUTO: 7.7 K/UL — SIGNIFICANT CHANGE UP (ref 3.8–10.5)
WBC # FLD AUTO: 8.6 K/UL — SIGNIFICANT CHANGE UP (ref 3.8–10.5)

## 2018-05-21 PROCEDURE — 93306 TTE W/DOPPLER COMPLETE: CPT | Mod: 26

## 2018-05-21 PROCEDURE — 99232 SBSQ HOSP IP/OBS MODERATE 35: CPT

## 2018-05-21 PROCEDURE — 99291 CRITICAL CARE FIRST HOUR: CPT

## 2018-05-21 RX ORDER — HEPARIN SODIUM 5000 [USP'U]/ML
1450 INJECTION INTRAVENOUS; SUBCUTANEOUS
Qty: 25000 | Refills: 0 | Status: DISCONTINUED | OUTPATIENT
Start: 2018-05-21 | End: 2018-05-21

## 2018-05-21 RX ORDER — MIDODRINE HYDROCHLORIDE 2.5 MG/1
10 TABLET ORAL THREE TIMES A DAY
Qty: 0 | Refills: 0 | Status: DISCONTINUED | OUTPATIENT
Start: 2018-05-21 | End: 2018-05-22

## 2018-05-21 RX ORDER — HEPARIN SODIUM 5000 [USP'U]/ML
1300 INJECTION INTRAVENOUS; SUBCUTANEOUS
Qty: 25000 | Refills: 0 | Status: DISCONTINUED | OUTPATIENT
Start: 2018-05-21 | End: 2018-05-22

## 2018-05-21 RX ORDER — MAGNESIUM SULFATE 500 MG/ML
2 VIAL (ML) INJECTION ONCE
Qty: 0 | Refills: 0 | Status: COMPLETED | OUTPATIENT
Start: 2018-05-21 | End: 2018-05-21

## 2018-05-21 RX ADMIN — Medication 100 MILLIGRAM(S): at 06:06

## 2018-05-21 RX ADMIN — Medication 100 MILLIGRAM(S): at 21:42

## 2018-05-21 RX ADMIN — MIDODRINE HYDROCHLORIDE 10 MILLIGRAM(S): 2.5 TABLET ORAL at 13:00

## 2018-05-21 RX ADMIN — Medication 20 MILLIGRAM(S): at 13:01

## 2018-05-21 RX ADMIN — Medication 650 MILLIGRAM(S): at 22:58

## 2018-05-21 RX ADMIN — Medication 1 TABLET(S): at 13:01

## 2018-05-21 RX ADMIN — Medication 50 GRAM(S): at 22:38

## 2018-05-21 RX ADMIN — PANTOPRAZOLE SODIUM 40 MILLIGRAM(S): 20 TABLET, DELAYED RELEASE ORAL at 06:06

## 2018-05-21 RX ADMIN — Medication 20 MILLIGRAM(S): at 06:07

## 2018-05-21 RX ADMIN — Medication 650 MILLIGRAM(S): at 23:28

## 2018-05-21 RX ADMIN — MIDODRINE HYDROCHLORIDE 20 MILLIGRAM(S): 2.5 TABLET ORAL at 06:06

## 2018-05-21 RX ADMIN — Medication 500 MICROGRAM(S): at 01:19

## 2018-05-21 RX ADMIN — Medication 20 MILLIGRAM(S): at 21:42

## 2018-05-21 RX ADMIN — Medication 100 MILLIGRAM(S): at 13:00

## 2018-05-21 RX ADMIN — MIDODRINE HYDROCHLORIDE 10 MILLIGRAM(S): 2.5 TABLET ORAL at 21:42

## 2018-05-21 RX ADMIN — PHENYLEPHRINE HYDROCHLORIDE 3.64 MICROGRAM(S)/KG/MIN: 10 INJECTION INTRAVENOUS at 06:06

## 2018-05-21 RX ADMIN — HEPARIN SODIUM 13 UNIT(S)/HR: 5000 INJECTION INTRAVENOUS; SUBCUTANEOUS at 08:42

## 2018-05-21 NOTE — PROGRESS NOTE ADULT - ASSESSMENT
37F with asthma and pulmonary hypertension on O2 with hypotension of uncertain etiology.  She had leukocytosis that has resolved with multiple negative BC.  No definite infection.    Suggest:  - continue to observe off antibiotics    Please call Infectious Diseases if there is a change in status.  Thank you.  [phone].

## 2018-05-21 NOTE — PROGRESS NOTE ADULT - SUBJECTIVE AND OBJECTIVE BOX
Follow Up:  leukocytosis    Interval History/ROS:  on steroids now. BC negative.  awaiting transfer to [facility] for further management.  no fever. no n/v/d.  still with SOB. no dysuria.  still on kennedi.  Rest of ROS otherwise negative.    Allergies  No Known Allergies    ANTIMICROBIALS:  none    MEDICATIONS  (STANDING):  benzonatate 100 three times a day  heparin  Infusion 1300 <Continuous>  methylPREDNISolone sodium succinate Injectable 20 every 8 hours  midodrine 10 three times a day  pantoprazole    Tablet 40 before breakfast  phenylephrine    Infusion 0.1 <Continuous>    Vital Signs Last 24 Hrs  T(F): 98.9 (05-21-18 @ 12:00), Max: 99 (05-21-18 @ 08:00)  HR: 56 (05-21-18 @ 15:30)  BP: 94/53 (05-21-18 @ 15:30)  RR: 17 (05-21-18 @ 15:30)  SpO2: 100% (05-21-18 @ 15:30) (98% - 100%)  Wt(kg): --    PHYSICAL EXAM:  General: non-toxic  HEAD/EYES: anicteric  ENT:  supple  Cardiovascular:   S1, S2  Respiratory:  decreased bs  GI:  soft, non-tender, normal bowel sounds  :  no lucas  Musculoskeletal:  no synovitis  Neurologic:  grossly non-focal  Skin:  no rash  Psychiatric:  appropriate affect but frustrated  Vascular:  no phlebitis                        9.6    8.6   )-----------( 344      ( 21 May 2018 07:27 )             31.3     138  |  102  |  9   ----------------------------<  132<H>  4.3   |  21<L>  |  0.83    Ca    9.7      21 May 2018 00:59  Phos  3.5     05-21  Mg     1.9     05-21    TPro  7.3  /  Alb  4.2  /  TBili  0.3  /  DBili  x   /  AST  15  /  ALT  16  /  AlkPhos  38<L>  05-21    MICROBIOLOGY:  .Blood Blood-Venous  05-18-18   No growth to date.  --  --    .Blood Blood-Peripheral  05-16-18   No growth to date.  --  --    .Blood Blood-Peripheral  05-16-18   No growth at 5 days.  --  --    RADIOLOGY:  CT Angio Chest w/ IV Cont (05.02.18 @ 21:09) >  IMPRESSION:  No evidence of pulmonary embolism.  Pulmonary hypertension with enlarged main pulmonary artery, right atrium and ventricle. There is thickening of the right ventricular free wall Interval resolution of previously seen airspace opacities, with residual scattered bilateral groundglass opacities in a mosaic pattern, likely secondary to pulmonary hypertension.

## 2018-05-21 NOTE — PROGRESS NOTE ADULT - ATTENDING COMMENTS
1 Pulmonary HTN of unclear etiology.  [facility] vascular screen negative.  Pt has not tolerate Adempas as outpatient nor Flolan in ICU.  Pt remains hypotensive on pressors. Decrease midodrine  to 10 mg q 8hrs. Pt started on solumedrol 20 mg q 8 hrs .  No signs of infection.    Will transfer to [facility] for further work up when bed available.    cc time 35 min

## 2018-05-21 NOTE — PROGRESS NOTE ADULT - SUBJECTIVE AND OBJECTIVE BOX
CHIEF COMPLAINT: 36 y/o F with hx of Pulmonary arterial hypertension who was admitted on 5/1 to medicine for SOB, unable to control her symptoms, transitioned to prostacyclin via IV and became hypotensive requiring vasopressors. Aldactone and adempas been on hold. No flolan trials.    Interval Events:    REVIEW OF SYSTEMS:  Constitutional: [ ] negative [ ] fevers [ ] chills [ ] weight loss [ ] weight gain  HEENT: [ ] negative [ ] dry eyes [ ] eye irritation [ ] postnasal drip [ ] nasal congestion  CV: [ ] negative  [ ] chest pain [ ] orthopnea [ ] palpitations [ ] murmur  Resp: [ ] negative [ ] cough [ ] shortness of breath [ ] dyspnea [ ] wheezing [ ] sputum [ ] hemoptysis  GI: [ ] negative [ ] nausea [ ] vomiting [ ] diarrhea [ ] constipation [ ] abd pain [ ] dysphagia   : [ ] negative [ ] dysuria [ ] nocturia [ ] hematuria [ ] increased urinary frequency  Musculoskeletal: [ ] negative [ ] back pain [ ] myalgias [ ] arthralgias [ ] fracture  Skin: [ ] negative [ ] rash [ ] itch  Neurological: [ ] negative [ ] headache [ ] dizziness [ ] syncope [ ] weakness [ ] numbness  Psychiatric: [ ] negative [ ] anxiety [ ] depression  Endocrine: [ ] negative [ ] diabetes [ ] thyroid problem  Hematologic/Lymphatic: [ ] negative [ ] anemia [ ] bleeding problem  Allergic/Immunologic: [ ] negative [ ] itchy eyes [ ] nasal discharge [ ] hives [ ] angioedema  [ ] All other systems negative  [ ] Unable to assess ROS because ________    OBJECTIVE:  ICU Vital Signs Last 24 Hrs  T(C): 36.6 (21 May 2018 04:00), Max: 37.3 (20 May 2018 08:00)  T(F): 97.9 (21 May 2018 04:00), Max: 99.2 (20 May 2018 08:00)  HR: 51 (21 May 2018 07:00) (47 - 121)  BP: 117/79 (21 May 2018 07:00) (75/44 - 150/99)  BP(mean): 94 (21 May 2018 07:00) (55 - 121)  ABP: --  ABP(mean): --  RR: 16 (21 May 2018 07:00) (15 - 28)  SpO2: 100% (21 May 2018 07:00) (96% - 100%)        05-20 @ 07:01  -  05-21 @ 07:00  --------------------------------------------------------  IN: 2562.6 mL / OUT: 2225 mL / NET: 337.6 mL      CAPILLARY BLOOD GLUCOSE          PHYSICAL EXAM:  General: Comfortable, NAD  HEENT: EOMI, PERRLA  Lymph Nodes: no cervical  or clavicular adenopathy  Neck: no thyromegaly, midline trachea  Respiratory:  CTA b/l, no wheezing  Cardiovascular:  +S1S2, RRR  Abdomen: soft, NTND, bowel sounds present  Extremities: SHO, no joint erythema  Skin: dry, intact  Neurological: AAO x3, no focal deficits  Psychiatry: Appropriate mood and affect    LINES:    HOSPITAL MEDICATIONS:  Standing Meds:  benzonatate 100 milliGRAM(s) Oral three times a day  ferrous    sulfate 325 milliGRAM(s) Oral daily  heparin  Infusion 1450 Unit(s)/Hr IV Continuous <Continuous>  methylPREDNISolone sodium succinate Injectable 20 milliGRAM(s) IV Push every 8 hours  midodrine 20 milliGRAM(s) Oral every 8 hours  multivitamin 1 Tablet(s) Oral daily  pantoprazole    Tablet 40 milliGRAM(s) Oral before breakfast  phenylephrine    Infusion 0.1 MICROgram(s)/kG/Min IV Continuous <Continuous>      PRN Meds:  acetaminophen   Tablet. 650 milliGRAM(s) Oral every 6 hours PRN  aluminum hydroxide/magnesium hydroxide/simethicone Suspension 30 milliLiter(s) Oral every 4 hours PRN  guaiFENesin    Syrup 100 milliGRAM(s) Oral every 6 hours PRN  ipratropium    for Nebulization 500 MICROGram(s) Nebulizer every 6 hours PRN      LABS:                        9.9    7.7   )-----------( 327      ( 21 May 2018 00:59 )             30.9     Hgb Trend: 9.9<--, 9.9<--, 9.7<--, 9.6<--, 9.8<--  05-21    138  |  102  |  9   ----------------------------<  132<H>  4.3   |  21<L>  |  0.83    Ca    9.7      21 May 2018 00:59  Phos  3.5     05-21  Mg     1.9     05-21    TPro  7.3  /  Alb  4.2  /  TBili  0.3  /  DBili  x   /  AST  15  /  ALT  16  /  AlkPhos  38<L>  05-21    Creatinine Trend: 0.83<--, 1.08<--, 1.07<--, 0.93<--, 0.96<--, 1.01<--  PT/INR - ( 21 May 2018 00:59 )   PT: 12.2 sec;   INR: 1.12 ratio         PTT - ( 21 May 2018 00:59 )  PTT:46.5 sec      MICROBIOLOGY:     RADIOLOGY:  [ ] Reviewed and interpreted by me    EKG: CHIEF COMPLAINT: 36 y/o F with hx of Pulmonary arterial hypertension who was admitted on 5/1 to medicine for SOB, unable to control her symptoms, transitioned to prostacyclin via IV and became hypotensive requiring vasopressors. Aldactone and adempas been on hold. No further flolan trials. Pending potential transfer for lung transplant    Interval Events: No acute events overnight. Fem Line removed, PIVs placed. Tolerating NC. Hemodynamically stable on [place]. No BM for 2 days.    REVIEW OF SYSTEMS:  Constitutional: [x ] negative [ ] fevers [ ] chills [ ] weight loss [ ] weight gain  HEENT: [x ] negative [ ] dry eyes [ ] eye irritation [ ] postnasal drip [ ] nasal congestion  CV: [ ] negative  [ ] chest pain [ ] orthopnea [x ] palpitations [ ] murmur  Resp: [x ] negative [ ] cough [ ] shortness of breath [ ] dyspnea [ ] wheezing [ ] sputum [ ] hemoptysis  GI: [ x] negative [ ] nausea [ ] vomiting [ ] diarrhea [ ] constipation [ ] abd pain [ ] dysphagia   : [x ] negative [ ] dysuria [ ] nocturia [ ] hematuria [ ] increased urinary frequency  Musculoskeletal: [x ] negative [ ] back pain [ ] myalgias [ ] arthralgias [ ] fracture  Skin: [x ] negative [ ] rash [ ] itch  Neurological: [x ] negative [ ] headache [ ] dizziness [ ] syncope [ ] weakness [ ] numbness  Psychiatric: [x ] negative [ ] anxiety [ ] depression  Endocrine: [x ] negative [ ] diabetes [ ] thyroid problem  Hematologic/Lymphatic: [x ] negative [ ] anemia [ ] bleeding problem  Allergic/Immunologic: [x ] negative [ ] itchy eyes [ ] nasal discharge [ ] hives [ ] angioedema  [ ] All other systems negative  [ ] Unable to assess ROS because ________    OBJECTIVE:  ICU Vital Signs Last 24 Hrs  T(C): 36.6 (21 May 2018 04:00), Max: 37.3 (20 May 2018 08:00)  T(F): 97.9 (21 May 2018 04:00), Max: 99.2 (20 May 2018 08:00)  HR: 51 (21 May 2018 07:00) (47 - 121)  BP: 117/79 (21 May 2018 07:00) (75/44 - 150/99)  BP(mean): 94 (21 May 2018 07:00) (55 - 121)  ABP: --  ABP(mean): --  RR: 16 (21 May 2018 07:00) (15 - 28)  SpO2: 100% (21 May 2018 07:00) (96% - 100%)        05-20 @ 07:01  -  05-21 @ 07:00  --------------------------------------------------------  IN: 2562.6 mL / OUT: 2225 mL / NET: 337.6 mL    PHYSICAL EXAM:  General: Comfortable, NAD  HEENT: EOMI, PERRLA  Lymph Nodes: no cervical  or clavicular adenopathy  Neck: no thyromegaly, midline trachea  Respiratory:  CTA b/l, no wheezing  Cardiovascular:  +S1S2, RRR  Abdomen: soft, NTND, bowel sounds present  Extremities: SHO, no joint erythema  Skin: dry, intact  Neurological: AAO x3, no focal deficits  Psychiatry: Appropriate mood and affect    LINES: [facility] MEDICATIONS:  Standing Meds:  benzonatate 100 milliGRAM(s) Oral three times a day  ferrous    sulfate 325 milliGRAM(s) Oral daily  heparin  Infusion 1450 Unit(s)/Hr IV Continuous <Continuous>  methylPREDNISolone sodium succinate Injectable 20 milliGRAM(s) IV Push every 8 hours  midodrine 20 milliGRAM(s) Oral every 8 hours  multivitamin 1 Tablet(s) Oral daily  pantoprazole    Tablet 40 milliGRAM(s) Oral before breakfast  phenylephrine    Infusion 0.1 MICROgram(s)/kG/Min IV Continuous <Continuous>      PRN Meds:  acetaminophen   Tablet. 650 milliGRAM(s) Oral every 6 hours PRN  aluminum hydroxide/magnesium hydroxide/simethicone Suspension 30 milliLiter(s) Oral every 4 hours PRN  guaiFENesin    Syrup 100 milliGRAM(s) Oral every 6 hours PRN  ipratropium    for Nebulization 500 MICROGram(s) Nebulizer every 6 hours PRN      LABS:                        9.9    7.7   )-----------( 327      ( 21 May 2018 00:59 )             30.9     Hgb Trend: 9.9<--, 9.9<--, 9.7<--, 9.6<--, 9.8<--  05-21    138  |  102  |  9   ----------------------------<  132<H>  4.3   |  21<L>  |  0.83    Ca    9.7      21 May 2018 00:59  Phos  3.5     05-21  Mg     1.9     05-21    TPro  7.3  /  Alb  4.2  /  TBili  0.3  /  DBili  x   /  AST  15  /  ALT  16  /  AlkPhos  38<L>  05-21    Creatinine Trend: 0.83<--, 1.08<--, 1.07<--, 0.93<--, 0.96<--, 1.01<--  PT/INR - ( 21 May 2018 00:59 )   PT: 12.2 sec;   INR: 1.12 ratio         PTT - ( 21 May 2018 00:59 )  PTT:46.5 sec      MICROBIOLOGY:     RADIOLOGY:  [ ] Reviewed and interpreted by me    EKG: CHIEF COMPLAINT: 38 y/o F with hx of Pulmonary arterial hypertension who was admitted on 5/1 to medicine for SOB, unable to control her symptoms, transitioned to prostacyclin via IV and became hypotensive requiring vasopressors. Aldactone and adempas been on hold. No further flolan trials. Pending potential transfer for lung transplant    Interval Events: No acute events overnight. Fem Line removed, PIVs placed. Tolerating NC. Hemodynamically stable on [place]. No BM for 2 days. Intermittent bradycardia    REVIEW OF SYSTEMS:  Constitutional: [x ] negative [ ] fevers [ ] chills [ ] weight loss [ ] weight gain  HEENT: [x ] negative [ ] dry eyes [ ] eye irritation [ ] postnasal drip [ ] nasal congestion  CV: [ ] negative  [ ] chest pain [ ] orthopnea [x ] palpitations [ ] murmur  Resp: [x ] negative [ ] cough [ ] shortness of breath [ ] dyspnea [ ] wheezing [ ] sputum [ ] hemoptysis  GI: [ x] negative [ ] nausea [ ] vomiting [ ] diarrhea [ ] constipation [ ] abd pain [ ] dysphagia   : [x ] negative [ ] dysuria [ ] nocturia [ ] hematuria [ ] increased urinary frequency  Musculoskeletal: [x ] negative [ ] back pain [ ] myalgias [ ] arthralgias [ ] fracture  Skin: [x ] negative [ ] rash [ ] itch  Neurological: [x ] negative [ ] headache [ ] dizziness [ ] syncope [ ] weakness [ ] numbness  Psychiatric: [x ] negative [ ] anxiety [ ] depression  Endocrine: [x ] negative [ ] diabetes [ ] thyroid problem  Hematologic/Lymphatic: [x ] negative [ ] anemia [ ] bleeding problem  Allergic/Immunologic: [x ] negative [ ] itchy eyes [ ] nasal discharge [ ] hives [ ] angioedema  [ ] All other systems negative  [ ] Unable to assess ROS because ________    OBJECTIVE:  ICU Vital Signs Last 24 Hrs  T(C): 36.6 (21 May 2018 04:00), Max: 37.3 (20 May 2018 08:00)  T(F): 97.9 (21 May 2018 04:00), Max: 99.2 (20 May 2018 08:00)  HR: 51 (21 May 2018 07:00) (47 - 121)  BP: 117/79 (21 May 2018 07:00) (75/44 - 150/99)  BP(mean): 94 (21 May 2018 07:00) (55 - 121)  ABP: --  ABP(mean): --  RR: 16 (21 May 2018 07:00) (15 - 28)  SpO2: 100% (21 May 2018 07:00) (96% - 100%)        05-20 @ 07:01  -  05-21 @ 07:00  --------------------------------------------------------  IN: 2562.6 mL / OUT: 2225 mL / NET: 337.6 mL    PHYSICAL EXAM:  General: Comfortable, NAD  HEENT: EOMI, PERRLA  Lymph Nodes: no cervical  or clavicular adenopathy  Neck: no thyromegaly, midline trachea  Respiratory:  CTA b/l, no wheezing  Cardiovascular:  +S1S2, RRR  Abdomen: soft, NTND, bowel sounds present  Extremities: SHO, no joint erythema  Skin: dry, intact  Neurological: AAO x3, no focal deficits  Psychiatry: Appropriate mood and affect    LINES: [facility] MEDICATIONS:  Standing Meds:  benzonatate 100 milliGRAM(s) Oral three times a day  ferrous    sulfate 325 milliGRAM(s) Oral daily  heparin  Infusion 1450 Unit(s)/Hr IV Continuous <Continuous>  methylPREDNISolone sodium succinate Injectable 20 milliGRAM(s) IV Push every 8 hours  midodrine 20 milliGRAM(s) Oral every 8 hours  multivitamin 1 Tablet(s) Oral daily  pantoprazole    Tablet 40 milliGRAM(s) Oral before breakfast  phenylephrine    Infusion 0.1 MICROgram(s)/kG/Min IV Continuous <Continuous>      PRN Meds:  acetaminophen   Tablet. 650 milliGRAM(s) Oral every 6 hours PRN  aluminum hydroxide/magnesium hydroxide/simethicone Suspension 30 milliLiter(s) Oral every 4 hours PRN  guaiFENesin    Syrup 100 milliGRAM(s) Oral every 6 hours PRN  ipratropium    for Nebulization 500 MICROGram(s) Nebulizer every 6 hours PRN      LABS:                        9.9    7.7   )-----------( 327      ( 21 May 2018 00:59 )             30.9     Hgb Trend: 9.9<--, 9.9<--, 9.7<--, 9.6<--, 9.8<--  05-21    138  |  102  |  9   ----------------------------<  132<H>  4.3   |  21<L>  |  0.83    Ca    9.7      21 May 2018 00:59  Phos  3.5     05-21  Mg     1.9     05-21    TPro  7.3  /  Alb  4.2  /  TBili  0.3  /  DBili  x   /  AST  15  /  ALT  16  /  AlkPhos  38<L>  05-21    Creatinine Trend: 0.83<--, 1.08<--, 1.07<--, 0.93<--, 0.96<--, 1.01<--  PT/INR - ( 21 May 2018 00:59 )   PT: 12.2 sec;   INR: 1.12 ratio         PTT - ( 21 May 2018 00:59 )  PTT:46.5 sec      MICROBIOLOGY:     RADIOLOGY:  [ ] Reviewed and interpreted by me    EKG:

## 2018-05-21 NOTE — PROGRESS NOTE ADULT - ASSESSMENT
Patient is a 37 year-old female admitted to the MICU to institute Flolan therapy for baseline pulmonary HTN, patient subsequently developed hypotension 2/2 medication and was started on vasopressors. Currently off od adempas, aldactone and flolan. Managed with heparin gtt for chronic PE, currently on high dose midodrine and being weaned off phenylphrine.   #Neuro: no issues  -monitor MS  -OOB, PT    #Skin-   -Dry, intact  -No acute issues    #GI  -Continue lactulose, miralax, to titrate to bowel movements daily  -C/w pantoprazole 40mg daily  -Regular diet    #Renal: no active issues  -Trend I's+O's, S-Cr    #Heme-   -on heparin gtt for hx of chronic PE, continue per normogram  -monitor for bleeding  -hx of JULIA on ferrous sulfate, MV    #ID- no active issues, increasing WBC with no evidence of infection.   -Monitor Tmax and WBC  -ID consulted for leukocytosis  -Check ESR, CRP  -Follow up repeat blood culture    #CV: Hx Pulm HTN diagnosed in 2014, right heart cath on 5/5 with elevated right pressures; swan cathy catheterization at bedside to evaluate for PA pressures.   -Gifford Cathy now removed, cordis in place. Will remove femoral line after peripheral IV placement.  -Wean off of phenylephrine as tolerated  -Decreased midodrine 20mg every 8hrs  -Given negative blood cultures, will solumedrol 20mg every 8 hrs  -Wean off of phenylephrine  -Monitor hemodynamics    #Pulm: Asthma / Pulm Htn/ PE (hx)  -Holding flolan , aldactone and adempas at this time given persistent hypotension  -IVF resuscitation as tolerated  -monitor O2 sat, O2 supplementation via NC at nighttime  -On heparin gtt for known VTE    #VTE PPx:  -On heparin drip Patient is a 37 year-old female admitted to the MICU to institute Flolan therapy for baseline pulmonary HTN, patient subsequently developed hypotension 2/2 medication and was started on vasopressors. Currently off adempas, aldactone, and flolan. Currently on heparin gtt for chronic PE, and on high dose midodrine and being weaned off phenylphrine.     #Neuro: no issues  -monitor MS  -OOB, PT    #CV: Hx Pulm HTN diagnosed in 2014, right heart cath on 5/5 with elevated right pressures; s/p swan nick catheterization at bedside to evaluate for PA pressures.   -Wean off of phenylephrine as tolerated  -Decreased midodrine 20mg every 8hrs  -Given negative blood cultures, trialing solumedrol 20mg every 8 hrs  -Monitor hemodynamics    #Pulm: Asthma / Pulm Htn/ PE (hx)  -Holding flolan, aldactone and adempas at this time given persistent hypotension  -IVF resuscitation as tolerated  -monitor O2 sat, O2 supplementation via NC at nighttime  -On heparin gtt for known VTE  -f/u potential transfer for lung transplant    #Skin-   -Dry, intact  -No acute issues    #GI  -constipation: Continue lactulose, miralax, to titrate to bowel movements daily  -C/w pantoprazole 40mg daily  -Regular diet    #Renal: no active issues  -Trend I's+O's, S-Cr    #Heme-   -on heparin gtt for hx of chronic PE, continue per normogram  -monitor for bleeding  -hx of JULIA on ferrous sulfate, MV    #ID- no active issues, increasing WBC with no evidence of infection.   -Monitor Tmax and WBC  -leukocytosis resolved, Bld Cx's NGTD (5/18)  -appreciate ID recs    #VTE PPx:  -On heparin drip Patient is a 37 year-old female admitted to the MICU to institute Flolan therapy for baseline pulmonary HTN, patient subsequently developed hypotension 2/2 medication and was started on vasopressors. Currently off adempas, aldactone, and flolan. Currently on heparin gtt for chronic PE, and on high dose midodrine and being weaned off phenylphrine.     #Neuro: no issues  -monitor MS  -OOB, PT    #CV: Hx Pulm HTN diagnosed in 2014, right heart cath on 5/5 with elevated right pressures; s/p swan nick catheterization at bedside to evaluate for PA pressures.   -Wean off of phenylephrine as tolerated  -Decreased midodrine 20mg every 8hrs  -Given negative blood cultures, trialing solumedrol 20mg every 8 hrs  -Monitor hemodynamics    #Pulm: Asthma / Pulm Htn/ PE (hx)  -Holding flolan, aldactone and adempas at this time given persistent hypotension  -IVF resuscitation as tolerated  -monitor O2 sat, O2 supplementation via NC at nighttime  -On heparin gtt for known VTE, patient specific PTT = 60-90  -f/u potential transfer for lung transplant    #Skin-   -Dry, intact  -No acute issues    #GI  -constipation: Continue lactulose, miralax, to titrate to bowel movements daily  -C/w pantoprazole 40mg daily  -Regular diet    #Renal: no active issues  -Trend I's+O's, S-Cr    #Heme-   -on heparin gtt for hx of chronic PE, continue per normogram  -monitor for bleeding  -hx of JULIA on ferrous sulfate, MV    #ID- no active issues, increasing WBC with no evidence of infection.   -Monitor Tmax and WBC  -leukocytosis resolved, Bld Cx's NGTD (5/18)  -appreciate ID recs    #VTE PPx:  -On heparin drip Patient is a 37 year-old female admitted to the MICU to institute Flolan therapy for baseline pulmonary HTN, patient subsequently developed hypotension 2/2 medication and was started on vasopressors. Currently off adempas, aldactone, and flolan. Currently on heparin gtt for chronic PE, and on high dose midodrine and being weaned off phenylphrine.     #Neuro: no issues  -monitor MS  -OOB, PT    #CV: Hx Pulm HTN diagnosed in 2014, right heart cath on 5/5 with elevated right pressures; s/p swan nick catheterization at bedside to evaluate for PA pressures.   -Wean off of phenylephrine as tolerated  -Decrease midodrine 10mg every 8hrs 2/2 bradycardia  -Given negative blood cultures, trialing solumedrol 20mg every 8 hrs  -Monitor hemodynamics    #Pulm: Asthma / Pulm Htn/ PE (hx)  -Holding flolan, aldactone and adempas at this time given persistent hypotension  -IVF resuscitation as tolerated  -monitor O2 sat, O2 supplementation via NC at nighttime  -On heparin gtt for known VTE, patient specific PTT = 60-90  -f/u potential transfer to [place] for lung transplant    #Skin-   -Dry, intact  -No acute issues    #GI  -constipation: Continue lactulose, miralax, to titrate to bowel movements daily  -C/w pantoprazole 40mg daily  -Regular diet    #Renal: no active issues  -Trend I's+O's, S-Cr    #Heme-   -on heparin gtt for hx of chronic PE, continue per normogram  -monitor for bleeding  -hx of JULIA on ferrous sulfate, MV    #ID- no active issues, leukocytosis resolved  -Monitor Tmax and WBC  -leukocytosis resolved, Bld Cx's NGTD (5/18)  -appreciate ID recs    #VTE PPx:  -On heparin drip

## 2018-05-22 VITALS — RESPIRATION RATE: 28 BRPM | HEART RATE: 93 BPM | OXYGEN SATURATION: 100 %

## 2018-05-22 LAB
ALBUMIN SERPL ELPH-MCNC: 4.1 G/DL — SIGNIFICANT CHANGE UP (ref 3.3–5)
ALP SERPL-CCNC: 33 U/L — LOW (ref 40–120)
ALT FLD-CCNC: 28 U/L — SIGNIFICANT CHANGE UP (ref 10–45)
ANION GAP SERPL CALC-SCNC: 13 MMOL/L — SIGNIFICANT CHANGE UP (ref 5–17)
AST SERPL-CCNC: 21 U/L — SIGNIFICANT CHANGE UP (ref 10–40)
BILIRUB SERPL-MCNC: 0.3 MG/DL — SIGNIFICANT CHANGE UP (ref 0.2–1.2)
BUN SERPL-MCNC: 12 MG/DL — SIGNIFICANT CHANGE UP (ref 7–23)
CALCIUM SERPL-MCNC: 9.6 MG/DL — SIGNIFICANT CHANGE UP (ref 8.4–10.5)
CHLORIDE SERPL-SCNC: 102 MMOL/L — SIGNIFICANT CHANGE UP (ref 96–108)
CO2 SERPL-SCNC: 23 MMOL/L — SIGNIFICANT CHANGE UP (ref 22–31)
CREAT SERPL-MCNC: 1.03 MG/DL — SIGNIFICANT CHANGE UP (ref 0.5–1.3)
GLUCOSE SERPL-MCNC: 136 MG/DL — HIGH (ref 70–99)
HCT VFR BLD CALC: 30.1 % — LOW (ref 34.5–45)
HGB BLD-MCNC: 9.7 G/DL — LOW (ref 11.5–15.5)
INR BLD: 1.11 RATIO — SIGNIFICANT CHANGE UP (ref 0.88–1.16)
MAGNESIUM SERPL-MCNC: 2.6 MG/DL — SIGNIFICANT CHANGE UP (ref 1.6–2.6)
MCHC RBC-ENTMCNC: 21.1 PG — LOW (ref 27–34)
MCHC RBC-ENTMCNC: 32.3 GM/DL — SIGNIFICANT CHANGE UP (ref 32–36)
MCV RBC AUTO: 65.2 FL — LOW (ref 80–100)
PHOSPHATE SERPL-MCNC: 4.3 MG/DL — SIGNIFICANT CHANGE UP (ref 2.5–4.5)
PLATELET # BLD AUTO: 367 K/UL — SIGNIFICANT CHANGE UP (ref 150–400)
POTASSIUM SERPL-MCNC: 4.3 MMOL/L — SIGNIFICANT CHANGE UP (ref 3.5–5.3)
POTASSIUM SERPL-SCNC: 4.3 MMOL/L — SIGNIFICANT CHANGE UP (ref 3.5–5.3)
PROT SERPL-MCNC: 7.2 G/DL — SIGNIFICANT CHANGE UP (ref 6–8.3)
PROTHROM AB SERPL-ACNC: 12 SEC — SIGNIFICANT CHANGE UP (ref 9.8–12.7)
RBC # BLD: 4.61 M/UL — SIGNIFICANT CHANGE UP (ref 3.8–5.2)
RBC # FLD: 18.9 % — HIGH (ref 10.3–14.5)
SODIUM SERPL-SCNC: 138 MMOL/L — SIGNIFICANT CHANGE UP (ref 135–145)
WBC # BLD: 14.3 K/UL — HIGH (ref 3.8–10.5)
WBC # FLD AUTO: 14.3 K/UL — HIGH (ref 3.8–10.5)

## 2018-05-22 PROCEDURE — 86235 NUCLEAR ANTIGEN ANTIBODY: CPT

## 2018-05-22 PROCEDURE — 83690 ASSAY OF LIPASE: CPT

## 2018-05-22 PROCEDURE — 85652 RBC SED RATE AUTOMATED: CPT

## 2018-05-22 PROCEDURE — 82947 ASSAY GLUCOSE BLOOD QUANT: CPT

## 2018-05-22 PROCEDURE — 86225 DNA ANTIBODY NATIVE: CPT

## 2018-05-22 PROCEDURE — 84540 ASSAY OF URINE/UREA-N: CPT

## 2018-05-22 PROCEDURE — 80053 COMPREHEN METABOLIC PANEL: CPT

## 2018-05-22 PROCEDURE — 84145 PROCALCITONIN (PCT): CPT

## 2018-05-22 PROCEDURE — 86900 BLOOD TYPING SEROLOGIC ABO: CPT

## 2018-05-22 PROCEDURE — 87040 BLOOD CULTURE FOR BACTERIA: CPT

## 2018-05-22 PROCEDURE — 99285 EMERGENCY DEPT VISIT HI MDM: CPT

## 2018-05-22 PROCEDURE — C1817: CPT

## 2018-05-22 PROCEDURE — 85027 COMPLETE CBC AUTOMATED: CPT

## 2018-05-22 PROCEDURE — 84484 ASSAY OF TROPONIN QUANT: CPT

## 2018-05-22 PROCEDURE — 84443 ASSAY THYROID STIM HORMONE: CPT

## 2018-05-22 PROCEDURE — 82550 ASSAY OF CK (CPK): CPT

## 2018-05-22 PROCEDURE — 82435 ASSAY OF BLOOD CHLORIDE: CPT

## 2018-05-22 PROCEDURE — 86160 COMPLEMENT ANTIGEN: CPT

## 2018-05-22 PROCEDURE — 84436 ASSAY OF TOTAL THYROXINE: CPT

## 2018-05-22 PROCEDURE — 82553 CREATINE MB FRACTION: CPT

## 2018-05-22 PROCEDURE — 82330 ASSAY OF CALCIUM: CPT

## 2018-05-22 PROCEDURE — 97162 PT EVAL MOD COMPLEX 30 MIN: CPT

## 2018-05-22 PROCEDURE — 82533 TOTAL CORTISOL: CPT

## 2018-05-22 PROCEDURE — 84295 ASSAY OF SERUM SODIUM: CPT

## 2018-05-22 PROCEDURE — 83605 ASSAY OF LACTIC ACID: CPT

## 2018-05-22 PROCEDURE — 82131 AMINO ACIDS SINGLE QUANT: CPT

## 2018-05-22 PROCEDURE — 99233 SBSQ HOSP IP/OBS HIGH 50: CPT

## 2018-05-22 PROCEDURE — 86901 BLOOD TYPING SEROLOGIC RH(D): CPT

## 2018-05-22 PROCEDURE — 86038 ANTINUCLEAR ANTIBODIES: CPT

## 2018-05-22 PROCEDURE — 86140 C-REACTIVE PROTEIN: CPT

## 2018-05-22 PROCEDURE — 84300 ASSAY OF URINE SODIUM: CPT

## 2018-05-22 PROCEDURE — 84439 ASSAY OF FREE THYROXINE: CPT

## 2018-05-22 PROCEDURE — 84481 FREE ASSAY (FT-3): CPT

## 2018-05-22 PROCEDURE — 83935 ASSAY OF URINE OSMOLALITY: CPT

## 2018-05-22 PROCEDURE — 76937 US GUIDE VASCULAR ACCESS: CPT

## 2018-05-22 PROCEDURE — 80048 BASIC METABOLIC PNL TOTAL CA: CPT

## 2018-05-22 PROCEDURE — 94640 AIRWAY INHALATION TREATMENT: CPT

## 2018-05-22 PROCEDURE — 84132 ASSAY OF SERUM POTASSIUM: CPT

## 2018-05-22 PROCEDURE — 93010 ELECTROCARDIOGRAM REPORT: CPT

## 2018-05-22 PROCEDURE — 93451 RIGHT HEART CATH: CPT

## 2018-05-22 PROCEDURE — 93005 ELECTROCARDIOGRAM TRACING: CPT

## 2018-05-22 PROCEDURE — 83735 ASSAY OF MAGNESIUM: CPT

## 2018-05-22 PROCEDURE — 85730 THROMBOPLASTIN TIME PARTIAL: CPT

## 2018-05-22 PROCEDURE — 99291 CRITICAL CARE FIRST HOUR: CPT

## 2018-05-22 PROCEDURE — 86036 ANCA SCREEN EACH ANTIBODY: CPT

## 2018-05-22 PROCEDURE — C1769: CPT

## 2018-05-22 PROCEDURE — 85014 HEMATOCRIT: CPT

## 2018-05-22 PROCEDURE — 82803 BLOOD GASES ANY COMBINATION: CPT

## 2018-05-22 PROCEDURE — C1894: CPT

## 2018-05-22 PROCEDURE — 86850 RBC ANTIBODY SCREEN: CPT

## 2018-05-22 PROCEDURE — 84100 ASSAY OF PHOSPHORUS: CPT

## 2018-05-22 PROCEDURE — 71045 X-RAY EXAM CHEST 1 VIEW: CPT

## 2018-05-22 PROCEDURE — 84156 ASSAY OF PROTEIN URINE: CPT

## 2018-05-22 PROCEDURE — 85610 PROTHROMBIN TIME: CPT

## 2018-05-22 PROCEDURE — 93306 TTE W/DOPPLER COMPLETE: CPT

## 2018-05-22 PROCEDURE — 71046 X-RAY EXAM CHEST 2 VIEWS: CPT

## 2018-05-22 PROCEDURE — 71275 CT ANGIOGRAPHY CHEST: CPT

## 2018-05-22 RX ADMIN — PANTOPRAZOLE SODIUM 40 MILLIGRAM(S): 20 TABLET, DELAYED RELEASE ORAL at 05:52

## 2018-05-22 RX ADMIN — Medication 20 MILLIGRAM(S): at 14:39

## 2018-05-22 RX ADMIN — Medication 1 TABLET(S): at 12:21

## 2018-05-22 RX ADMIN — MIDODRINE HYDROCHLORIDE 10 MILLIGRAM(S): 2.5 TABLET ORAL at 05:52

## 2018-05-22 RX ADMIN — Medication 100 MILLIGRAM(S): at 14:39

## 2018-05-22 RX ADMIN — Medication 100 MILLIGRAM(S): at 05:52

## 2018-05-22 RX ADMIN — Medication 20 MILLIGRAM(S): at 05:52

## 2018-05-22 NOTE — PROGRESS NOTE ADULT - SUBJECTIVE AND OBJECTIVE BOX
Subjective: She continues to complain of intermittent palpitations, which leads to extreme fatigue and exhaustion. She has remained in bed. No fevers, nausea, or vomiting.     Medications:  acetaminophen   Tablet. 650 milliGRAM(s) Oral every 6 hours PRN  aluminum hydroxide/magnesium hydroxide/simethicone Suspension 30 milliLiter(s) Oral every 4 hours PRN  benzonatate 100 milliGRAM(s) Oral three times a day  ferrous    sulfate 325 milliGRAM(s) Oral daily  guaiFENesin    Syrup 100 milliGRAM(s) Oral every 6 hours PRN  heparin  Infusion 1300 Unit(s)/Hr IV Continuous <Continuous>  ipratropium    for Nebulization 500 MICROGram(s) Nebulizer every 6 hours PRN  methylPREDNISolone sodium succinate Injectable 20 milliGRAM(s) IV Push every 8 hours  midodrine 10 milliGRAM(s) Oral three times a day  multivitamin 1 Tablet(s) Oral daily  pantoprazole    Tablet 40 milliGRAM(s) Oral before breakfast  phenylephrine    Infusion 0.1 MICROgram(s)/kG/Min IV Continuous <Continuous>      Physical Exam:    Vitals:  T(C): 37.1 (18 @ 12:00), Max: 37.3 (18 @ 16:00)  HR: 53 (18 @ 14:00) (50 - 187)  BP: 106/62 (18 @ 13:45) (78/46 - 145/100)  BP(mean): 78 (18 @ 13:45) (56 - 117)  RR: 15 (18 @ 14:00) (14 - 35)  SpO2: 100% (18 @ 14:00) (100% - 100%)      Daily Weight in k.4 (22 May 2018 04:00)    I&O's Summary    21 May 2018 07:  -  22 May 2018 07:00  --------------------------------------------------------  IN: 1034.7 mL / OUT: 2125 mL / NET: -1090.3 mL    22 May 2018 07:01  -  22 May 2018 15:04  --------------------------------------------------------  IN: 95.9 mL / OUT: 0 mL / NET: 95.9 mL    General: No distress. Comfortable.  HEENT: EOM intact.  Neck: Neck supple. JVP not elevated. No masses  Chest: Clear to auscultation bilaterally  CV: RRR with normal S1 and S2 (Split). No rubs or gallops. Radial pulses normal. No edema.   Abdomen: Soft, non-distended, non-tender  Skin: No rashes or skin breakdown  Neurology: Alert and oriented times three. Sensation intact  Psych: Affect normal    Labs:                        9.7    14.3  )-----------( 367      ( 22 May 2018 01:07 )             30.1     05-    138  |  102  |  12  ----------------------------<  136<H>  4.3   |  23  |  1.03    Ca    9.6      22 May 2018 01:07  Phos  4.3     05-  Mg     2.6         TPro  7.2  /  Alb  4.1  /  TBili  0.3  /  DBili  x   /  AST  21  /  ALT  28  /  AlkPhos  33<L>  -    PT/INR - ( 22 May 2018 01:07 )   PT: 12.0 sec;   INR: 1.11 ratio         PTT - ( 21 May 2018 21:35 )  PTT:62.5 sec

## 2018-05-22 NOTE — PROGRESS NOTE ADULT - PROBLEM SELECTOR PLAN 5
Hx of iron deficiency anemia; no s/s of bleeding  - c/w ferrous sulfate 325  - trend H/H via CBC  - transfuse if < 7; low probability of bleeding
Hx of iron deficiency anemia; no s/s of bleeding  - c/w ferrous sulfate 325  - trend H/H via CBC  - transfuse if < 7; low probability of bleeding
Hx of iron deficiency anemia in setting of heavy menstrual bleeding now s/p D+C; no s/s of bleeding currently  - c/w ferrous sulfate 325  - trend H/H via CBC  - transfuse if < 7
Hx of iron deficiency anemia; no s/s of bleeding  - c/w ferrous sulfate 325  - trend H/H via CBC  - transfuse if < 7; low probability of bleeding
Notable for low MCV and high RDW. Please evaluate for hemolytic anemia or iron deficiency. A hemolytic anemia could explain a high output state with PAH.
Perform serial EKGs to look for evidence of pericarditis. Consider sending rheumatologic panel to query lupus.
chronic, also related to PO meds  - monitor; short course of PPI as needed
Hx of iron deficiency anemia; no s/s of bleeding  - c/w ferrous sulfate 325  - trend H/H via CBC  - transfuse if < 7; low probability of bleeding

## 2018-05-22 NOTE — PROGRESS NOTE ADULT - ATTENDING COMMENTS
1. Hypotension and Pulm HTN of unclear  etiology. Pt still requiring iv and po pressors. However, pressors have decreased once on steroids.  Recent TTE with RV function fairly normal but enlarged. Oxygenating on only 2-3 liters O2.    Continue current pressors. TO transfer to [place] when bed  available.    CC time 35 min

## 2018-05-22 NOTE — CHART NOTE - NSCHARTNOTEFT_GEN_A_CORE
Follow up.    Chart reviewed. Adm dx: pulmonary HTN, awaiting transfer to [place].    Source: Patient [ x]    Family [ ]     other [x ] chart    Diet : Regular      Patient reports [ ] nausea  [ ] vomiting [ ] diarrhea [ ] constipation  [ ]chewing problems [ ] swallowing issues  [ ] other: last BM 5/19     PO intake:  < 50% [ ] 50-75% [ ]   % [x ]  other : reports good appetite     Source for PO intake [ x] Patient [ ] family [ ] chart [ ] staff [ ] other     Enteral /Parenteral Nutrition: n/a      Current Weight: 5/22 199, dosing wt 5/1 214  wt range 198-204  has 1+ dependent edema    Pertinent Medications: MEDICATIONS  (STANDING):  benzonatate 100 milliGRAM(s) Oral three times a day  ferrous    sulfate 325 milliGRAM(s) Oral daily  heparin  Infusion 1300 Unit(s)/Hr (13 mL/Hr) IV Continuous <Continuous>  methylPREDNISolone sodium succinate Injectable 20 milliGRAM(s) IV Push every 8 hours  midodrine 10 milliGRAM(s) Oral three times a day  multivitamin 1 Tablet(s) Oral daily  pantoprazole    Tablet 40 milliGRAM(s) Oral before breakfast  phenylephrine    Infusion 0.1 MICROgram(s)/kG/Min (3.638 mL/Hr) IV Continuous <Continuous>    MEDICATIONS  (PRN):  acetaminophen   Tablet. 650 milliGRAM(s) Oral every 6 hours PRN Mild Pain (1 - 3)  aluminum hydroxide/magnesium hydroxide/simethicone Suspension 30 milliLiter(s) Oral every 4 hours PRN Dyspepsia  guaiFENesin    Syrup 100 milliGRAM(s) Oral every 6 hours PRN Cough  ipratropium    for Nebulization 500 MICROGram(s) Nebulizer every 6 hours PRN Shortness of Breath and/or Wheezing    Pertinent Labs:  05-22 Na138 mmol/L Glu 136 mg/dL<H> K+ 4.3 mmol/L Cr  1.03 mg/dL BUN 12 mg/dL 05-22 Phos 4.3 mg/dL 05-22 Alb 4.1 g/dL    Skin: no pressure injuries    Estimated Needs:   [x ] no change since previous assessment  [ ] recalculated:       Previous Nutrition Diagnosis: none       New Nutrition Diagnosis: [x ] not applicable      Recommend    [ ] Change Diet To:    [ ] Nutrition Supplement    [ ] Nutrition Support    [x ] Other:  continue regular diet       Monitoring and Evaluation:     [x ] PO intake [ x] Tolerance to diet prescription [x ] weights [ x] follow up per protocol    [ ] other:

## 2018-05-22 NOTE — PROGRESS NOTE ADULT - ASSESSMENT
Ms. Conway is a 37 year old woman with WHO group I PAH with chronic RV systolic heart failure. She presented with syncope and symptomatic tachycardia with hypotension. She currently is on vasopressor support due to persistent hypotension, but has no evidence of low cardiac output on exam nor is she volume overloaded. Her vasopressor requirements are less after the addition of steroids. Her echocardiogram is not consistent with worsening RV failure as the etiology of her hypotension and is notable for a dilated RV, but with normal RV systolic function as estimated by TAPSE and S'. She is anemic with a very low MCV and high RDW of unclear etiology.     Please call me with questions at [phone]. Recommendations discussed with MICU team and Dr. Yoon.

## 2018-05-22 NOTE — PROGRESS NOTE ADULT - SUBJECTIVE AND OBJECTIVE BOX
CHIEF COMPLAINT: SOB    38 y/o F with hx of Pulmonary arterial hypertension who was admitted on 5/1 to medicine for SOB, unable to control her symptoms, transitioned to prostacyclin via IV and became hypotensive requiring vasopressors. Aldactone, Adempas, and Flolan being held. Pending potential transfer for lung transplant, tentatively accepted to [place] for evaluation.    Interval Events:    REVIEW OF SYSTEMS:  Constitutional: [x ] negative [ ] fevers [ ] chills [ ] weight loss [ ] weight gain  HEENT: [x ] negative [ ] dry eyes [ ] eye irritation [ ] postnasal drip [ ] nasal congestion  CV: [ ] negative  [ ] chest pain [ ] orthopnea [x ] palpitations [ ] murmur  Resp: [x ] negative [ ] cough [ ] shortness of breath [ ] dyspnea [ ] wheezing [ ] sputum [ ] hemoptysis  GI: [ x] negative [ ] nausea [ ] vomiting [ ] diarrhea [ ] constipation [ ] abd pain [ ] dysphagia   : [x ] negative [ ] dysuria [ ] nocturia [ ] hematuria [ ] increased urinary frequency  Musculoskeletal: [x ] negative [ ] back pain [ ] myalgias [ ] arthralgias [ ] fracture  Skin: [x ] negative [ ] rash [ ] itch  Neurological: [x ] negative [ ] headache [ ] dizziness [ ] syncope [ ] weakness [ ] numbness  Psychiatric: [x ] negative [ ] anxiety [ ] depression  Endocrine: [x ] negative [ ] diabetes [ ] thyroid problem  Hematologic/Lymphatic: [x ] negative [ ] anemia [ ] bleeding problem  Allergic/Immunologic: [x ] negative [ ] itchy eyes [ ] nasal discharge [ ] hives [ ] angioedema  [x] All other systems negative  [ ] Unable to assess ROS because ________    OBJECTIVE:  ICU Vital Signs Last 24 Hrs  T(C): 37 (22 May 2018 04:15), Max: 37.3 (21 May 2018 16:00)  T(F): 98.6 (22 May 2018 04:15), Max: 99.2 (21 May 2018 16:00)  HR: 58 (22 May 2018 07:00) (48 - 187)  BP: 116/75 (22 May 2018 07:00) (82/53 - 146/74)  BP(mean): 90 (22 May 2018 07:00) (63 - 117)  RR: 28 (22 May 2018 07:00) (14 - 42)  SpO2: 100% (22 May 2018 07:00) (98% - 100%)      05-21 @ 07:01  -  05-22 @ 07:00  --------------------------------------------------------  IN: 1034.7 mL / OUT: 2125 mL / NET: -1090.3 mL        PHYSICAL EXAM:  General: Comfortable, NAD  HEENT: EOMI, PERRLA  Lymph Nodes: no cervical  or clavicular adenopathy  Neck: no thyromegaly, midline trachea  Respiratory:  CTA b/l, no wheezing  Cardiovascular:  +S1S2, RRR  Abdomen: soft, NTND, bowel sounds present  Extremities: SHO, no joint erythema  Skin: dry, intact  Neurological: AAO x3, no focal deficits  Psychiatry: Appropriate mood and affect    LINES: [facility] MEDICATIONS:  Standing Meds:  benzonatate 100 milliGRAM(s) Oral three times a day  ferrous    sulfate 325 milliGRAM(s) Oral daily  heparin  Infusion 1300 Unit(s)/Hr IV Continuous <Continuous>  methylPREDNISolone sodium succinate Injectable 20 milliGRAM(s) IV Push every 8 hours  midodrine 10 milliGRAM(s) Oral three times a day  multivitamin 1 Tablet(s) Oral daily  pantoprazole    Tablet 40 milliGRAM(s) Oral before breakfast  phenylephrine    Infusion 0.1 MICROgram(s)/kG/Min IV Continuous <Continuous>      PRN Meds:  acetaminophen   Tablet. 650 milliGRAM(s) Oral every 6 hours PRN  aluminum hydroxide/magnesium hydroxide/simethicone Suspension 30 milliLiter(s) Oral every 4 hours PRN  guaiFENesin    Syrup 100 milliGRAM(s) Oral every 6 hours PRN  ipratropium    for Nebulization 500 MICROGram(s) Nebulizer every 6 hours PRN      LABS:                        9.7    14.3  )-----------( 367      ( 22 May 2018 01:07 )             30.1     Hgb Trend: 9.7<--, 9.6<--, 9.9<--, 9.9<--, 9.7<--  05-22    138  |  102  |  12  ----------------------------<  136<H>  4.3   |  23  |  1.03    Ca    9.6      22 May 2018 01:07  Phos  4.3     05-22  Mg     2.6     05-22    TPro  7.2  /  Alb  4.1  /  TBili  0.3  /  DBili  x   /  AST  21  /  ALT  28  /  AlkPhos  33<L>  05-22    Creatinine Trend: 1.03<--, 0.83<--, 1.08<--, 1.07<--, 0.93<--, 0.96<--  PT/INR - ( 22 May 2018 01:07 )   PT: 12.0 sec;   INR: 1.11 ratio         PTT - ( 21 May 2018 21:35 )  PTT:62.5 sec      MICROBIOLOGY:     RADIOLOGY:  [ ] Reviewed and interpreted by me    EKG: CHIEF COMPLAINT: SOB    36 y/o F with hx of Pulmonary arterial hypertension who was admitted on 5/1 to medicine for SOB, unable to control her symptoms, transitioned to prostacyclin via IV and became hypotensive requiring vasopressors. Aldactone, Adempas, and Flolan being held. Pending potential transfer for lung transplant, tentatively accepted to [place] for evaluation.    Interval Events: two episodes of SVT overnight, spontaneously resolved. no other events.    REVIEW OF SYSTEMS:  Constitutional: [x ] negative [ ] fevers [ ] chills [ ] weight loss [ ] weight gain  HEENT: [x ] negative [ ] dry eyes [ ] eye irritation [ ] postnasal drip [ ] nasal congestion  CV: [ ] negative  [ ] chest pain [ ] orthopnea [x ] palpitations [ ] murmur  Resp: [x ] negative [ ] cough [ ] shortness of breath [ ] dyspnea [ ] wheezing [ ] sputum [ ] hemoptysis  GI: [ x] negative [ ] nausea [ ] vomiting [ ] diarrhea [ ] constipation [ ] abd pain [ ] dysphagia   : [x ] negative [ ] dysuria [ ] nocturia [ ] hematuria [ ] increased urinary frequency  Musculoskeletal: [x ] negative [ ] back pain [ ] myalgias [ ] arthralgias [ ] fracture  Skin: [x ] negative [ ] rash [ ] itch  Neurological: [x ] negative [ ] headache [ ] dizziness [ ] syncope [ ] weakness [ ] numbness  Psychiatric: [x ] negative [ ] anxiety [ ] depression  Endocrine: [x ] negative [ ] diabetes [ ] thyroid problem  Hematologic/Lymphatic: [x ] negative [ ] anemia [ ] bleeding problem  Allergic/Immunologic: [x ] negative [ ] itchy eyes [ ] nasal discharge [ ] hives [ ] angioedema  [x] All other systems negative  [ ] Unable to assess ROS because ________    OBJECTIVE:  ICU Vital Signs Last 24 Hrs  T(C): 37 (22 May 2018 04:15), Max: 37.3 (21 May 2018 16:00)  T(F): 98.6 (22 May 2018 04:15), Max: 99.2 (21 May 2018 16:00)  HR: 58 (22 May 2018 07:00) (48 - 187)  BP: 116/75 (22 May 2018 07:00) (82/53 - 146/74)  BP(mean): 90 (22 May 2018 07:00) (63 - 117)  RR: 28 (22 May 2018 07:00) (14 - 42)  SpO2: 100% (22 May 2018 07:00) (98% - 100%)      05-21 @ 07:01  -  05-22 @ 07:00  --------------------------------------------------------  IN: 1034.7 mL / OUT: 2125 mL / NET: -1090.3 mL        PHYSICAL EXAM:  General: Comfortable, NAD  HEENT: EOMI, PERRLA  Lymph Nodes: no cervical  or clavicular adenopathy  Neck: no thyromegaly, midline trachea  Respiratory:  CTA b/l, no wheezing  Cardiovascular:  +S1S2, RRR  Abdomen: soft, NTND, bowel sounds present  Extremities: SHO, no joint erythema  Skin: dry, intact  Neurological: AAO x3, no focal deficits  Psychiatry: Appropriate mood and affect    LINES: [facility] MEDICATIONS:  Standing Meds:  benzonatate 100 milliGRAM(s) Oral three times a day  ferrous    sulfate 325 milliGRAM(s) Oral daily  heparin  Infusion 1300 Unit(s)/Hr IV Continuous <Continuous>  methylPREDNISolone sodium succinate Injectable 20 milliGRAM(s) IV Push every 8 hours  midodrine 10 milliGRAM(s) Oral three times a day  multivitamin 1 Tablet(s) Oral daily  pantoprazole    Tablet 40 milliGRAM(s) Oral before breakfast  phenylephrine    Infusion 0.1 MICROgram(s)/kG/Min IV Continuous <Continuous>      PRN Meds:  acetaminophen   Tablet. 650 milliGRAM(s) Oral every 6 hours PRN  aluminum hydroxide/magnesium hydroxide/simethicone Suspension 30 milliLiter(s) Oral every 4 hours PRN  guaiFENesin    Syrup 100 milliGRAM(s) Oral every 6 hours PRN  ipratropium    for Nebulization 500 MICROGram(s) Nebulizer every 6 hours PRN      LABS:                        9.7    14.3  )-----------( 367      ( 22 May 2018 01:07 )             30.1     Hgb Trend: 9.7<--, 9.6<--, 9.9<--, 9.9<--, 9.7<--  05-22    138  |  102  |  12  ----------------------------<  136<H>  4.3   |  23  |  1.03    Ca    9.6      22 May 2018 01:07  Phos  4.3     05-22  Mg     2.6     05-22    TPro  7.2  /  Alb  4.1  /  TBili  0.3  /  DBili  x   /  AST  21  /  ALT  28  /  AlkPhos  33<L>  05-22    Creatinine Trend: 1.03<--, 0.83<--, 1.08<--, 1.07<--, 0.93<--, 0.96<--  PT/INR - ( 22 May 2018 01:07 )   PT: 12.0 sec;   INR: 1.11 ratio         PTT - ( 21 May 2018 21:35 )  PTT:62.5 sec      MICROBIOLOGY:     RADIOLOGY:  [ ] Reviewed and interpreted by me    EKG:

## 2018-05-22 NOTE — PROGRESS NOTE ADULT - NSHPATTENDINGPLANDISCUSS_GEN_ALL_CORE
PULM .ICU TEAM
resident and fellow.
PULM TEAM
fellow
pulm team
resident and fellow.
pulm team
pulm team
resident and fellow
resident and fellow.
Pulm Fellow, Patient

## 2018-05-22 NOTE — PROGRESS NOTE ADULT - PROVIDER SPECIALTY LIST ADULT
Cardiology
Heart Failure
Heart Failure
Infectious Disease
Internal Medicine
MICU
Pulmonology
MICU
Internal Medicine
MICU
Pulmonology
Pulmonology
Internal Medicine
Internal Medicine

## 2018-05-22 NOTE — PROGRESS NOTE ADULT - ASSESSMENT
Patient is a 37 year-old female admitted to the MICU to institute Flolan therapy for baseline pulmonary HTN, patient subsequently developed hypotension 2/2 medication and was started on vasopressors. Currently off adempas, aldactone, and flolan. Currently on heparin gtt for chronic PE, and on high dose midodrine and being weaned off phenylphrine.     #Neuro: no issues  -monitor MS  -OOB, PT    #CV: Hx Pulm HTN diagnosed in 2014, right heart cath on 5/5 with elevated right pressures; s/p swan nick catheterization at bedside to evaluate for PA pressures.   -Wean off of phenylephrine as tolerated  -Decrease midodrine 10mg every 8hrs 2/2 bradycardia  -Given negative blood cultures, trialing solumedrol 20mg every 8 hrs  -Monitor hemodynamics    #Pulm: Asthma / Pulm Htn/ PE (hx)  -Holding flolan, aldactone and adempas at this time given persistent hypotension  -IVF resuscitation as tolerated  -monitor O2 sat, O2 supplementation via NC at nighttime  -On heparin gtt for known VTE, patient specific PTT = 60-90  -f/u potential transfer to [place] for lung transplant    #Skin-   -Dry, intact  -No acute issues    #GI  -constipation: Continue lactulose, miralax, to titrate to bowel movements daily  -C/w pantoprazole 40mg daily  -Regular diet    #Renal: no active issues  -Trend I's+O's, S-Cr    #Heme-   -on heparin gtt for hx of chronic PE, continue per normogram  -monitor for bleeding  -hx of JULIA on ferrous sulfate, MV    #ID- no active issues, leukocytosis resolved  -Monitor Tmax and WBC  -leukocytosis resolved, Bld Cx's NGTD (5/18)  -appreciate ID recs    #VTE PPx:  -On heparin drip 37 year-old female admitted to the MICU to institute Flolan therapy for baseline pulmonary HTN, patient subsequently developed hypotension 2/2 medication and was started on vasopressors. Currently off adempas, aldactone, and flolan. Currently on heparin gtt for chronic PE, and on high dose midodrine and being weaned off phenylphrine.     #Neuro: no issues  -monitor MS  -OOB, PT    #CV: Hx Pulm HTN diagnosed in 2014, right heart cath on 5/5 with elevated right pressures; s/p swan nick catheterization at bedside to evaluate for PA pressures.   -Wean off of phenylephrine as tolerated  -Decrease midodrine 10mg every 8hrs 2/2 bradycardia  -Given negative blood cultures, trialing solumedrol 20mg every 8 hrs  -Monitor hemodynamics    #Pulm: Asthma / Pulm Htn/ PE (hx)  -Holding flolan, aldactone and adempas at this time given persistent hypotension  -IVF resuscitation as tolerated  -monitor O2 sat, O2 supplementation via NC at nighttime  -On heparin gtt for known VTE, patient specific PTT = 60-90  -f/u potential transfer to [place] for lung transplant    #Skin-   -Dry, intact  -No acute issues    #GI  -constipation: Continue lactulose, miralax, to titrate to bowel movements daily  -C/w pantoprazole 40mg daily  -Regular diet    #Renal: no active issues  -Trend I's+O's, S-Cr    #Heme-   -on heparin gtt for hx of chronic PE, continue per normogram  -monitor for bleeding  -hx of JULIA on ferrous sulfate, MV    #ID- no active issues, leukocytosis resolved  -Monitor Tmax and WBC  -leukocytosis resolved, Bld Cx's NGTD (5/18)  -appreciate ID recs    #VTE PPx:  -On heparin drip 37 year-old female admitted to the MICU to institute Flolan therapy for baseline pulmonary HTN, patient subsequently developed hypotension 2/2 medication and was started on vasopressors. Currently off adempas, aldactone, and flolan. Currently on heparin gtt for chronic PE, and on high dose midodrine and being weaned off phenylphrine.     #Neuro: no issues  -monitor MS  -OOB, PT    #CV: Hx Pulm HTN diagnosed in 2014, right heart cath on 5/5 with elevated right pressures; s/p swan nick catheterization at bedside to evaluate for PA pressures.   -Wean off of phenylephrine as tolerated  -c/w midodrine 10mg every 8hrs 2/2 bradycardia  -c/w trial of solumedrol 20mg every 8 hrs  -Monitor hemodynamics    #Pulm: Asthma / Pulm Htn/ PE (hx)  -Holding flolan, aldactone and adempas at this time given persistent hypotension  -IVF resuscitation as tolerated  -monitor O2 sat, O2 supplementation via NC at nighttime  -On heparin gtt for known VTE, patient specific PTT = 60-90  -accepted to [place] for lung transplant, pending bed    #Skin-   -Dry, intact  -No acute issues    #GI  -constipation: Continue lactulose, miralax, to titrate to bowel movements daily  -C/w pantoprazole 40mg daily  -Regular diet    #Renal: no active issues  -Trend I's+O's, S-Cr    #Heme-   -on heparin gtt for hx of chronic PE, continue per normogram  -monitor for bleeding  -hx of JULIA on ferrous sulfate, MV    #ID- no active issues, afebrile  -leukocytosis likely related to steroids  -appreciate ID recs    #VTE PPx:  -On heparin drip

## 2018-05-23 LAB
CORTICOSTEROID BINDING GLOBULIN RESULT: 3.1 MG/DL — SIGNIFICANT CHANGE UP
CORTIS F/TOTAL MFR SERPL: 7.2 % — SIGNIFICANT CHANGE UP
CORTIS SERPL-MCNC: 18 UG/DL — SIGNIFICANT CHANGE UP
CORTISOL, FREE RESULT: 1.3 UG/DL — SIGNIFICANT CHANGE UP
CULTURE RESULTS: SIGNIFICANT CHANGE UP
CULTURE RESULTS: SIGNIFICANT CHANGE UP
SPECIMEN SOURCE: SIGNIFICANT CHANGE UP
SPECIMEN SOURCE: SIGNIFICANT CHANGE UP

## 2018-05-30 LAB — CYSTINE UR-MCNC: SIGNIFICANT CHANGE UP

## 2018-06-19 ENCOUNTER — APPOINTMENT (OUTPATIENT)
Dept: PULMONOLOGY | Facility: CLINIC | Age: 38
End: 2018-06-19

## 2018-07-10 ENCOUNTER — APPOINTMENT (OUTPATIENT)
Dept: PULMONOLOGY | Facility: CLINIC | Age: 38
End: 2018-07-10

## 2018-07-10 ENCOUNTER — NON-APPOINTMENT (OUTPATIENT)
Age: 38
End: 2018-07-10

## 2018-07-10 ENCOUNTER — CHART COPY (OUTPATIENT)
Age: 38
End: 2018-07-10

## 2018-08-03 ENCOUNTER — LABORATORY RESULT (OUTPATIENT)
Age: 38
End: 2018-08-03

## 2018-08-03 ENCOUNTER — APPOINTMENT (OUTPATIENT)
Dept: PULMONOLOGY | Facility: CLINIC | Age: 38
End: 2018-08-03
Payer: MEDICAID

## 2018-08-03 VITALS
RESPIRATION RATE: 18 BRPM | WEIGHT: 196 LBS | HEART RATE: 117 BPM | SYSTOLIC BLOOD PRESSURE: 110 MMHG | DIASTOLIC BLOOD PRESSURE: 70 MMHG | TEMPERATURE: 96.7 F | BODY MASS INDEX: 30.76 KG/M2 | HEIGHT: 67 IN | OXYGEN SATURATION: 93 %

## 2018-08-03 PROCEDURE — 36415 COLL VENOUS BLD VENIPUNCTURE: CPT

## 2018-08-03 PROCEDURE — 99215 OFFICE O/P EST HI 40 MIN: CPT | Mod: 25

## 2018-08-03 RX ORDER — METOPROLOL TARTRATE 25 MG/1
25 TABLET, FILM COATED ORAL
Refills: 0 | Status: DISCONTINUED | COMMUNITY
End: 2018-08-03

## 2018-08-03 RX ORDER — AZELASTINE HYDROCHLORIDE 137 UG/1
0.1 SPRAY, METERED NASAL TWICE DAILY
Qty: 1 | Refills: 2 | Status: DISCONTINUED | COMMUNITY
Start: 2018-03-20 | End: 2018-08-03

## 2018-08-03 RX ORDER — RIOCIGUAT 1.5 MG/1
1.5 TABLET, FILM COATED ORAL EVERY 8 HOURS
Qty: 90 | Refills: 0 | Status: DISCONTINUED | COMMUNITY
Start: 2018-04-26 | End: 2018-08-03

## 2018-08-03 RX ORDER — RIOCIGUAT 1 MG/1
1 TABLET, FILM COATED ORAL EVERY 8 HOURS
Qty: 45 | Refills: 0 | Status: DISCONTINUED | COMMUNITY
Start: 2018-04-26 | End: 2018-08-03

## 2018-08-03 RX ORDER — RIOCIGUAT 0.5 MG/1
0.5 TABLET, FILM COATED ORAL
Qty: 90 | Refills: 2 | Status: DISCONTINUED | COMMUNITY
Start: 2018-04-26 | End: 2018-08-03

## 2018-08-03 RX ORDER — SPIRONOLACTONE 25 MG/1
25 TABLET ORAL
Qty: 30 | Refills: 2 | Status: DISCONTINUED | COMMUNITY
Start: 2018-04-26 | End: 2018-08-03

## 2018-08-04 LAB
ALBUMIN SERPL ELPH-MCNC: 4.2 G/DL
ALP BLD-CCNC: 51 U/L
ALT SERPL-CCNC: <5 U/L
ANION GAP SERPL CALC-SCNC: 15 MMOL/L
AST SERPL-CCNC: 10 U/L
BASOPHILS # BLD AUTO: 0.1 K/UL
BASOPHILS NFR BLD AUTO: 0.9 %
BILIRUB SERPL-MCNC: 0.4 MG/DL
BUN SERPL-MCNC: 12 MG/DL
CALCIUM SERPL-MCNC: 9.3 MG/DL
CHLORIDE SERPL-SCNC: 102 MMOL/L
CO2 SERPL-SCNC: 21 MMOL/L
CREAT SERPL-MCNC: 1.04 MG/DL
EOSINOPHIL # BLD AUTO: 0.28 K/UL
EOSINOPHIL NFR BLD AUTO: 2.6 %
GLUCOSE SERPL-MCNC: 94 MG/DL
HCG SERPL-MCNC: <1 MIU/ML
HCT VFR BLD CALC: 31 %
HGB BLD-MCNC: 8.8 G/DL
LYMPHOCYTES # BLD AUTO: 2.76 K/UL
LYMPHOCYTES NFR BLD AUTO: 25.4 %
MAN DIFF?: NORMAL
MCHC RBC-ENTMCNC: 17.1 PG
MCHC RBC-ENTMCNC: 28.4 GM/DL
MCV RBC AUTO: 60.1 FL
MONOCYTES # BLD AUTO: 0.48 K/UL
MONOCYTES NFR BLD AUTO: 4.4 %
NEUTROPHILS # BLD AUTO: 7.26 K/UL
NEUTROPHILS NFR BLD AUTO: 66.7 %
NT-PROBNP SERPL-MCNC: 637 PG/ML
PLATELET # BLD AUTO: 460 K/UL
POTASSIUM SERPL-SCNC: 3.8 MMOL/L
PROT SERPL-MCNC: 7 G/DL
RBC # BLD: 5.16 M/UL
RBC # FLD: 18.8 %
SODIUM SERPL-SCNC: 138 MMOL/L
WBC # FLD AUTO: 10.88 K/UL

## 2018-09-11 ENCOUNTER — MEDICATION RENEWAL (OUTPATIENT)
Age: 38
End: 2018-09-11

## 2018-10-08 ENCOUNTER — LABORATORY RESULT (OUTPATIENT)
Age: 38
End: 2018-10-08

## 2018-10-08 ENCOUNTER — APPOINTMENT (OUTPATIENT)
Dept: CARDIOLOGY | Facility: CLINIC | Age: 38
End: 2018-10-08

## 2018-10-08 ENCOUNTER — MED ADMIN CHARGE (OUTPATIENT)
Age: 38
End: 2018-10-08

## 2018-10-08 ENCOUNTER — APPOINTMENT (OUTPATIENT)
Dept: PULMONOLOGY | Facility: CLINIC | Age: 38
End: 2018-10-08
Payer: MEDICAID

## 2018-10-08 VITALS
RESPIRATION RATE: 17 BRPM | HEART RATE: 128 BPM | DIASTOLIC BLOOD PRESSURE: 86 MMHG | TEMPERATURE: 98 F | SYSTOLIC BLOOD PRESSURE: 128 MMHG | OXYGEN SATURATION: 98 % | HEIGHT: 67 IN | BODY MASS INDEX: 30.45 KG/M2 | WEIGHT: 194 LBS

## 2018-10-08 DIAGNOSIS — K13.79 OTHER LESIONS OF ORAL MUCOSA: ICD-10-CM

## 2018-10-08 PROCEDURE — 90686 IIV4 VACC NO PRSV 0.5 ML IM: CPT

## 2018-10-08 PROCEDURE — G0008: CPT

## 2018-10-08 PROCEDURE — 99215 OFFICE O/P EST HI 40 MIN: CPT | Mod: 25

## 2018-10-08 PROCEDURE — 36415 COLL VENOUS BLD VENIPUNCTURE: CPT

## 2018-10-08 RX ORDER — PREDNISONE 10 MG/1
10 TABLET ORAL DAILY
Qty: 30 | Refills: 1 | Status: DISCONTINUED | COMMUNITY
Start: 2018-04-26 | End: 2018-10-08

## 2018-10-08 RX ORDER — AZITHROMYCIN 250 MG/1
250 TABLET, FILM COATED ORAL
Qty: 1 | Refills: 0 | Status: DISCONTINUED | COMMUNITY
Start: 2018-09-11 | End: 2018-10-08

## 2018-10-09 LAB — HCG SERPL-MCNC: <1 MIU/ML

## 2018-10-11 LAB
ALBUMIN SERPL ELPH-MCNC: 4.4 G/DL
ALP BLD-CCNC: 47 U/L
ALT SERPL-CCNC: 64 U/L
ANION GAP SERPL CALC-SCNC: 12 MMOL/L
AST SERPL-CCNC: 36 U/L
BASOPHILS # BLD AUTO: 0.09 K/UL
BASOPHILS NFR BLD AUTO: 0.9 %
BILIRUB SERPL-MCNC: 0.4 MG/DL
BUN SERPL-MCNC: 11 MG/DL
CALCIUM SERPL-MCNC: 9.1 MG/DL
CHLORIDE SERPL-SCNC: 106 MMOL/L
CO2 SERPL-SCNC: 22 MMOL/L
CREAT SERPL-MCNC: 1.11 MG/DL
EOSINOPHIL # BLD AUTO: 0.34 K/UL
EOSINOPHIL NFR BLD AUTO: 3.6 %
GLUCOSE SERPL-MCNC: 81 MG/DL
HCT VFR BLD CALC: 31.7 %
HGB BLD-MCNC: 8.7 G/DL
LYMPHOCYTES # BLD AUTO: 2.55 K/UL
LYMPHOCYTES NFR BLD AUTO: 26.8 %
MAN DIFF?: NORMAL
MCHC RBC-ENTMCNC: 16.2 PG
MCHC RBC-ENTMCNC: 27.4 GM/DL
MCV RBC AUTO: 58.9 FL
MONOCYTES # BLD AUTO: 0.76 K/UL
MONOCYTES NFR BLD AUTO: 8 %
NEUTROPHILS # BLD AUTO: 5.78 K/UL
NEUTROPHILS NFR BLD AUTO: 60.7 %
PLATELET # BLD AUTO: 391 K/UL
POTASSIUM SERPL-SCNC: 3.9 MMOL/L
PROT SERPL-MCNC: 7.2 G/DL
RBC # BLD: 5.4 M/UL
RBC # FLD: 21.7 %
SODIUM SERPL-SCNC: 140 MMOL/L
WBC # FLD AUTO: 9.53 K/UL

## 2018-10-15 ENCOUNTER — MEDICATION RENEWAL (OUTPATIENT)
Age: 38
End: 2018-10-15

## 2018-10-26 ENCOUNTER — MEDICATION RENEWAL (OUTPATIENT)
Age: 38
End: 2018-10-26

## 2018-11-07 ENCOUNTER — MOBILE ON CALL (OUTPATIENT)
Age: 38
End: 2018-11-07

## 2018-11-15 ENCOUNTER — MEDICATION RENEWAL (OUTPATIENT)
Age: 38
End: 2018-11-15

## 2018-12-11 ENCOUNTER — MEDICATION RENEWAL (OUTPATIENT)
Age: 38
End: 2018-12-11

## 2018-12-11 ENCOUNTER — APPOINTMENT (OUTPATIENT)
Dept: PULMONOLOGY | Facility: CLINIC | Age: 38
End: 2018-12-11

## 2019-01-02 ENCOUNTER — MEDICATION RENEWAL (OUTPATIENT)
Age: 39
End: 2019-01-02

## 2019-03-11 ENCOUNTER — MEDICATION RENEWAL (OUTPATIENT)
Age: 39
End: 2019-03-11

## 2019-03-13 ENCOUNTER — MEDICATION RENEWAL (OUTPATIENT)
Age: 39
End: 2019-03-13

## 2019-03-27 ENCOUNTER — RECORD ABSTRACTING (OUTPATIENT)
Age: 39
End: 2019-03-27

## 2019-03-27 RX ORDER — AMOXICILLIN AND CLAVULANATE POTASSIUM 875; 125 MG/1; MG/1
875-125 TABLET, COATED ORAL
Qty: 14 | Refills: 3 | Status: DISCONTINUED | COMMUNITY
Start: 2018-10-26 | End: 2019-03-27

## 2019-04-02 ENCOUNTER — APPOINTMENT (OUTPATIENT)
Dept: PULMONOLOGY | Facility: CLINIC | Age: 39
End: 2019-04-02

## 2019-04-16 ENCOUNTER — LABORATORY RESULT (OUTPATIENT)
Age: 39
End: 2019-04-16

## 2019-04-16 ENCOUNTER — APPOINTMENT (OUTPATIENT)
Dept: PULMONOLOGY | Facility: CLINIC | Age: 39
End: 2019-04-16
Payer: MEDICAID

## 2019-04-16 VITALS
BODY MASS INDEX: 28.88 KG/M2 | RESPIRATION RATE: 17 BRPM | TEMPERATURE: 98.6 F | WEIGHT: 184 LBS | HEIGHT: 67 IN | HEART RATE: 114 BPM | SYSTOLIC BLOOD PRESSURE: 105 MMHG | OXYGEN SATURATION: 96 % | DIASTOLIC BLOOD PRESSURE: 77 MMHG

## 2019-04-16 PROCEDURE — 99215 OFFICE O/P EST HI 40 MIN: CPT | Mod: 25

## 2019-04-16 PROCEDURE — 36415 COLL VENOUS BLD VENIPUNCTURE: CPT

## 2019-04-16 RX ORDER — LEVOFLOXACIN 500 MG/1
500 TABLET, FILM COATED ORAL DAILY
Qty: 7 | Refills: 0 | Status: DISCONTINUED | COMMUNITY
Start: 2019-03-27 | End: 2019-04-16

## 2019-04-16 NOTE — PHYSICAL EXAM
[General Appearance - Well Developed] : well developed [Normal Conjunctiva] : the conjunctiva exhibited no abnormalities [General Appearance - Well Nourished] : well nourished [II] : II [Arterial Pulses Normal] : the arterial pulses were normal [Respiration, Rhythm And Depth] : normal respiratory rhythm and effort [Exaggerated Use Of Accessory Muscles For Inspiration] : no accessory muscle use [Auscultation Breath Sounds / Voice Sounds] : lungs were clear to auscultation bilaterally [Bowel Sounds] : normal bowel sounds [Abdomen Soft] : soft [Abnormal Walk] : normal gait [Nail Clubbing] : no clubbing of the fingernails [Cyanosis, Localized] : no localized cyanosis [Petechial Hemorrhages (___cm)] : no petechial hemorrhages [] : no ischemic changes [Skin Color & Pigmentation] : normal skin color and pigmentation [Cranial Nerves] : cranial nerves 2-12 were intact [Normal Oral Mucosa] : normal oral mucosa [Normal Oropharynx] : normal oropharynx [Oriented To Time, Place, And Person] : oriented to person, place, and time [No Anxiety] : not feeling anxious [FreeTextEntry1] : No major pedal edema

## 2019-04-16 NOTE — DISCUSSION/SUMMARY
[FreeTextEntry1] : ------------Assessment and Plan---------38 year-old lady with PULMONARY ARTERIAL HYPERTENSION  S/P PPM  FOR TACHYCARDIA AT [place] ON S/Q REMODULIN - \par \par 1]   PULM ART HTN ]   --------- WHO GROUP I FUNCTIONAL CLASS III----[ AS PER  DR FREEMAN  WHO DID PULM ANGIO SHE HAS FEATURES OF GROUP I  WITH SOME EVIDENCE OF DISTAL CLOTS] She is on remodulin at 13ng- we will increase to 14ng/KG/MIN ---REPEAT  ECHO, REPEAT CT CHEST\par 2) ---She is now on xarelto---will be on lifelong anticoagulation---encouraged use of oxygen-\par 3), labs today\par 4) S/P PPM- follows EP clinic at [place] ---NEEDS TO F/U WITH EPS SERVICE AS WELL\par 5) get records from hospital/Dr Chow \par 6  F/U WITH CARDIOLOGY   DR CAROLINA FLORES\par 7---DOXYCYCLINE\par f/u in 6-8 weeks\par \par 7------RECEIVED FLU VACCINE -----\par \par   Patient at this time  will follow  the above mentioned recommendations --and f/u in DEC 2018----If you have any questions  I can be reached  at #  [phone]. \par

## 2019-04-16 NOTE — CONSULT LETTER
[Vivian Yoon MD, FCCP] : Vivian Yoon MD, FCCP [Director, Pulmonary Hypertension Program] : Director, Pulmonary Hypertension Program [[facility]] : [facility] [Division of Pulmonary, Critical Care and Sleep Medicine] : Division of Pulmonary, Critical Care and Sleep Medicine [Associate Professor of Medicine] : Associate Professor of Medicine

## 2019-04-16 NOTE — REASON FOR VISIT
[Follow-Up - From Hospitalization] : a hospitalization follow-up [Family Member] : family member [Follow-Up] : a follow-up visit [Pulmonary Hypertension] : pulmonary hypertension [FreeTextEntry2] : tachycardia [FreeTextEntry1] : PAH

## 2019-04-16 NOTE — LETTER CLOSING
[Vivian Yoon MD, FCCP] : Vivian Yoon MD, FCCP [Director, Pulmonary Hypertension Program] : Director, Pulmonary Hypertension Program [Division of Pulmonary, Critical Care and Sleep Medicine] : Division of Pulmonary, Critical Care and Sleep Medicine [[facility]] : [facility] [Associate Professor of Medicine] : Associate Professor of Medicine [[facility]] : [facility]

## 2019-04-16 NOTE — HISTORY OF PRESENT ILLNESS
[Stable] : are stable [None] : ~He/She~ has no significant interval events [Difficulty Breathing During Exertion] : stable dyspnea on exertion [Nightly] : nightly [Oxygen Rate  ___ lpm] : Oxygen rate is [unfilled] lpm [Oxygen] : the patient uses supplemental oxygen [Group I - Pulmonary Arterial HTN] : Group I - Pulmonary Arterial HTN: [Flu Vaccine] : influenza virus vaccine [FreeTextEntry1] : -----------------Patient is here for follow up of her pulmonary htn. ------------------INITIALLY  REFD  WITH   ECHO [DONE  OUTSIDE ] ELEVATED PASP  DILATED RV AND RA---------  HAS BEEN TOLD IN THE PAST SHE HAS  ASTHMA-----long-standing history of dyspnea on exertion-----------------has baseline tachycardia ----.....No history of fever, chills , rigors, chest pain, or hemoptysis. No h/o significant weight loss in last few months. No history of liver dysfunction , collagen vascular disorder or chronic thromboembolic disease. I would classify her dyspnea as WHO  FUNCTIONAL CLASS III-----------no calf tenderness----------SLEEP STUDY SHOWS AHI 0.6 but T 90  < 35 %-----------WAS ON ADMAPAS  NOW ON   SQ REMODULIN  [ SINCE MAY 2108]  ---S/P PACEMAKER PLACEMENT AT [place]  AND ON METOPROLOL------\par \par ------cardiac CATH ---- 28/ july / 2014.......PAP -76 , Aortic pressure -84, left ventricle edp 10 MMHG , rt atrium - 13, PAP after NO - 42--- CO 3.19,   PVR 1928.78 ( dsc) 24.12 ( woods) PVR after NO ( 692.51( dsc), 8.66 ( enrique)\par \par CARDIAC  CATH 2108----\par -\par --CTA CHEST---- july 23/2014--- no evidence of PE. non specific ground glass opacities throughout the lungs....moderate pericardial effusion ---\par \par --repeat CT chest WITHOUT CONTRAST  12/2014 MOSAIC PATTERN UNCHANGED  FROM BEFORE\par vq scan 6/2015 abnormal- low prob PE\par cxr-6/2015 no acute disease \par ---------CT CHEST-----1. Enlarged main pulmonary artery compatible with pulmonary \par hypertension. -2. Patchy bilateral groundglass are unchanged since 06/20/2015 and are of \par uncertain origin. ------       \par \par ECHO ....July 24/ 2014----right ventricular enlargement with decrease ventricular systolic function...estimated rt ventricle systolic pre equals 113mmhg, ---\par \par ECHO 2018----  [id number], [id number]\par \par SEPT 2104  6MWT- WALKED 301 METERS-------PSG   2014   LOW  T90 BUT NO ERICA\par \par \par \par  june 2015---SAW DR FREEMAN  AT [place]  -cath showed   elevated  pressures but  pa ANGIOgram   should only distal clts  not a candidate for  surgery for CTEPH---RA 4 mmhg, RV 46/5, PA 49/24 mean PA 33, PCWP 13   WITH SUGGESTION OF VASODILATOR  RESPONSE  [ D/W DR LUONG]------AS PER HEATH FREEMAN  SHE HAS GROUP I PAH  WITH DISTAL CLOTS S RECOMMENDED   MEDICAL TREATMENT---PT ON  PROCARDIA  AND  ADMAPAS---DID NOT TOLERATE OPSUMIT IN PAST [[-[ DUE TO LOW BP] --------- -------------------------------.\par \par ALSO ON ANTICOAGULATION-follows coumadin clinic  ------- elevated INR today DENIES BLEEDING/HEMATURIA/MELENA\par \par She reports noncompliance to adempas since 8/1/15 has  basla tachycardia f/u with DR KRISHNAMURTHY  cardilogy ---\par \par ---PFT AUG 2016------FVC 2.44, FEV 1 1.61,   TLC 4.37, mild to moderate obstructive lung defect\par ----jan 2017 6MWD 250 m---\par \par --------ECHO AUG 2016-----EF 82%-----PASP 26 mmHg\par --CATH 2014 ----MEAN PA 77, LVEDP 10, CO 3.14\par \par ------CT SCAN AUG 2016-------patchy bilateral groundglass unchanged since 6/20/2015-----enlarged PA 3.5 cm-----\par CT SCAN  2018--NO PE, PA 4.0 CM,MOSAIC ATTENUATION\par \par july 2018 s/p hospitalization for syncopal episode at home. She was transferred to [facility] where  a pacemaker was inserted d/t tachycardia and syncope. She was also subsequently started on remodulin SQ by Dr Chow--current rate 12ng. She is still have dyspnea.\par \par ------OCT 2018--------s/p hospitalization at [facility]------S/P PAEMAKER PLACEMENT AT [place]------started on remodulin at [place]---ON S/Q REMODULIN AT 13 NG/KG/MIN ---still has dizziness and diarrhea-------\par ---APRIL 2109-  NON COMPLIANT TO FOLLOW UPS,  C/O SITE PAIN IN ABDOMEN, STILL FEELS PALPITATIONS,

## 2019-04-17 ENCOUNTER — MEDICATION RENEWAL (OUTPATIENT)
Age: 39
End: 2019-04-17

## 2019-04-17 LAB
25(OH)D3 SERPL-MCNC: 10.9 NG/ML
ALBUMIN SERPL ELPH-MCNC: 4.4 G/DL
ALP BLD-CCNC: 61 U/L
ALT SERPL-CCNC: 7 U/L
ANION GAP SERPL CALC-SCNC: 12 MMOL/L
AST SERPL-CCNC: 13 U/L
BASOPHILS # BLD AUTO: 0.06 K/UL
BASOPHILS NFR BLD AUTO: 0.7 %
BILIRUB SERPL-MCNC: 0.4 MG/DL
BUN SERPL-MCNC: 9 MG/DL
CALCIUM SERPL-MCNC: 9 MG/DL
CHLORIDE SERPL-SCNC: 101 MMOL/L
CO2 SERPL-SCNC: 23 MMOL/L
CREAT SERPL-MCNC: 0.96 MG/DL
DEPRECATED KAPPA LC FREE/LAMBDA SER: 1.36 RATIO
EOSINOPHIL # BLD AUTO: 0.33 K/UL
EOSINOPHIL NFR BLD AUTO: 3.9 %
GLUCOSE SERPL-MCNC: 88 MG/DL
HCG SERPL-MCNC: <1 MIU/ML
HCT VFR BLD CALC: 35 %
HGB BLD-MCNC: 8.7 G/DL
IGA SER QL IEP: 149 MG/DL
IGG SER QL IEP: 1128 MG/DL
IGM SER QL IEP: 127 MG/DL
IMM GRANULOCYTES NFR BLD AUTO: 0.2 %
KAPPA LC CSF-MCNC: 1.01 MG/DL
KAPPA LC SERPL-MCNC: 1.37 MG/DL
LYMPHOCYTES # BLD AUTO: 2.07 K/UL
LYMPHOCYTES NFR BLD AUTO: 24.5 %
MAN DIFF?: NORMAL
MCHC RBC-ENTMCNC: 15.9 PG
MCHC RBC-ENTMCNC: 24.9 GM/DL
MCV RBC AUTO: 63.9 FL
MONOCYTES # BLD AUTO: 0.59 K/UL
MONOCYTES NFR BLD AUTO: 7 %
NEUTROPHILS # BLD AUTO: 5.39 K/UL
NEUTROPHILS NFR BLD AUTO: 63.7 %
NT-PROBNP SERPL-MCNC: 94 PG/ML
PLATELET # BLD AUTO: 558 K/UL
POTASSIUM SERPL-SCNC: 4.2 MMOL/L
PROT SERPL-MCNC: 7.3 G/DL
RBC # BLD: 5.48 M/UL
RBC # FLD: 21.9 %
SODIUM SERPL-SCNC: 136 MMOL/L
TSH SERPL-ACNC: 2.73 UIU/ML
WBC # FLD AUTO: 8.46 K/UL

## 2019-04-25 LAB — TOTAL IGE SMQN RAST: 274 KU/L

## 2019-05-03 ENCOUNTER — LABORATORY RESULT (OUTPATIENT)
Age: 39
End: 2019-05-03

## 2019-05-03 ENCOUNTER — MED ADMIN CHARGE (OUTPATIENT)
Age: 39
End: 2019-05-03

## 2019-05-03 ENCOUNTER — APPOINTMENT (OUTPATIENT)
Dept: PULMONOLOGY | Facility: CLINIC | Age: 39
End: 2019-05-03
Payer: MEDICAID

## 2019-05-03 VITALS
BODY MASS INDEX: 29.29 KG/M2 | WEIGHT: 187 LBS | OXYGEN SATURATION: 95 % | TEMPERATURE: 97.7 F | DIASTOLIC BLOOD PRESSURE: 76 MMHG | RESPIRATION RATE: 15 BRPM | HEART RATE: 85 BPM | SYSTOLIC BLOOD PRESSURE: 109 MMHG

## 2019-05-03 DIAGNOSIS — R21 RASH AND OTHER NONSPECIFIC SKIN ERUPTION: ICD-10-CM

## 2019-05-03 DIAGNOSIS — R06.2 WHEEZING: ICD-10-CM

## 2019-05-03 PROCEDURE — 36415 COLL VENOUS BLD VENIPUNCTURE: CPT

## 2019-05-03 PROCEDURE — 99215 OFFICE O/P EST HI 40 MIN: CPT | Mod: 25

## 2019-05-03 PROCEDURE — 96372 THER/PROPH/DIAG INJ SC/IM: CPT

## 2019-05-03 RX ORDER — METHYLPREDNISOLONE 40 MG/ML
40 INJECTION, POWDER, LYOPHILIZED, FOR SOLUTION INTRAMUSCULAR; INTRAVENOUS
Qty: 1 | Refills: 0 | Status: COMPLETED | OUTPATIENT
Start: 2019-05-03

## 2019-05-03 RX ORDER — RIVAROXABAN 20 MG/1
20 TABLET, FILM COATED ORAL
Qty: 30 | Refills: 2 | Status: DISCONTINUED | COMMUNITY
End: 2019-05-03

## 2019-05-03 RX ADMIN — METHYLPREDNISOLONE SODIUM SUCCINATE 0.5 MG: 40 INJECTION, POWDER, FOR SOLUTION INTRAMUSCULAR; INTRAVENOUS at 00:00

## 2019-05-03 NOTE — HISTORY OF PRESENT ILLNESS
[Stable] : are stable [None] : ~He/She~ has no significant interval events [Oxygen] : the patient uses supplemental oxygen [Difficulty Breathing During Exertion] : stable dyspnea on exertion [Nightly] : nightly [Oxygen Rate  ___ lpm] : Oxygen rate is [unfilled] lpm [Flu Vaccine] : influenza virus vaccine [Group I - Pulmonary Arterial HTN] : Group I - Pulmonary Arterial HTN: [FreeTextEntry1] : -----------------Patient is here for follow up of her pulmonary htn. ------------------INITIALLY  REFD  WITH   ECHO [DONE  OUTSIDE ] ELEVATED PASP  DILATED RV AND RA---------  HAS BEEN TOLD IN THE PAST SHE HAS  ASTHMA-----long-standing history of dyspnea on exertion-----------------has baseline tachycardia ----.....No history of fever, chills , rigors, chest pain, or hemoptysis. No h/o significant weight loss in last few months. No history of liver dysfunction , collagen vascular disorder or chronic thromboembolic disease. I would classify her dyspnea as WHO  FUNCTIONAL CLASS III-----------no calf tenderness----------SLEEP STUDY SHOWS AHI 0.6 but T 90  < 35 %-----DIAGNOSED  AS PAH ---------WAS ON ADMAPAS  NOW ON   SQ REMODULIN  [ SINCE MAY 2108]  ---S/P PACEMAKER PLACEMENT AT [place]  AND ON METOPROLOL------\par \par ------cardiac CATH ---- 28/ july / 2014.......PAP -76 , Aortic pressure -84, left ventricle edp 10 MMHG , rt atrium - 13, PAP after NO - 42--- CO 3.19,   PVR 1928.78 ( dsc) 24.12 ( woods) PVR after NO ( 692.51( dsc), 8.66 ( enrique)\par \par CARDIAC  CATH 2108----\par -\par --CTA CHEST---- july 23/2014--- no evidence of PE. non specific ground glass opacities throughout the lungs....moderate pericardial effusion ---\par \par --repeat CT chest WITHOUT CONTRAST  12/2014 MOSAIC PATTERN UNCHANGED  FROM BEFORE\par vq scan 6/2015 abnormal- low prob PE\par cxr-6/2015 no acute disease \par ---------CT CHEST-----1. Enlarged main pulmonary artery compatible with pulmonary \par hypertension. -2. Patchy bilateral groundglass are unchanged since 06/20/2015 and are of \par uncertain origin. ------       \par \par ECHO ....July 24/ 2014----right ventricular enlargement with decrease ventricular systolic function...estimated rt ventricle systolic pre equals 113mmhg, ---\par \par ECHO 2018----  EF80, [id number]\par \par SEPT 2104  6MWT- WALKED 301 METERS-------PSG   2014   LOW  T90 BUT NO ERICA\par \par \par \par  june 2015---SAW DR FREEMAN  AT [place]  -cath showed   elevated  pressures but  pa ANGIOgram   should only distal clts  not a candidate for  surgery for CTEPH---RA 4 mmhg, RV 46/5, PA 49/24 mean PA 33, PCWP 13   WITH SUGGESTION OF VASODILATOR  RESPONSE  [ D/W DR LUONG]------AS PER HEATH FREEMAN  SHE HAS GROUP I PAH  WITH DISTAL CLOTS S RECOMMENDED   MEDICAL TREATMENT---PT ON  PROCARDIA  AND  ADMAPAS---DID NOT TOLERATE OPSUMIT IN PAST [[-[ DUE TO LOW BP] --------- -------------------------------.\par \par ALSO ON ANTICOAGULATION-follows coumadin clinic  ------- elevated INR today DENIES BLEEDING/HEMATURIA/MELENA\par \par She reports noncompliance to adempas since 8/1/15 has  basla tachycardia f/u with DR KRISHNAMURTHY  cardilogy ---\par \par ---PFT AUG 2016------FVC 2.44, FEV 1 1.61,   TLC 4.37, mild to moderate obstructive lung defect\par ----jan 2017 6MWD 250 m---\par \par --------ECHO AUG 2016-----EF 82%-----PASP 26 mmHg\par --CATH 2014 ----MEAN PA 77, LVEDP 10, CO 3.14\par \par ------CT SCAN AUG 2016-------patchy bilateral groundglass unchanged since 6/20/2015-----enlarged PA 3.5 cm-----\par CT SCAN  2018--NO PE, PA 4.0 CM,MOSAIC ATTENUATION\par \par july 2018 s/p hospitalization for syncopal episode at home. She was transferred to [facility] where  a pacemaker was inserted d/t tachycardia and syncope. She was also subsequently started on remodulin SQ by Dr Chow--current rate 12ng. She is still have dyspnea.\par \par ------OCT 2018--------s/p hospitalization at [facility]------S/P PAEMAKER PLACEMENT AT [place]------started on remodulin at [place]---ON S/Q REMODULIN \par \par \par may 2019 here for f/u visit- PAH w/remodulin @14ng infusing subcutaneously\par she is c/o fatigue, sleep disturbance and palpitations. She is wheezing today & short of breath.\par LAst hgb=8 but is not compliant to iron d/t constipation\par

## 2019-05-03 NOTE — REVIEW OF SYSTEMS
[Sinus Problems] : sinus problems [Dyspnea] : dyspnea [Palpitations] : palpitations [Headache] : headache [Panic Attacks] : panic attacks [Anxiety] : anxiety [Negative] : Sleep Disorder [Fever] : no fever [Dry Eyes] : no dryness of the eyes [Chills] : no chills [Eye Irritation] : no ~T irritation of the eyes [Sputum] : not coughing up ~M sputum [Cough] : no cough [Hemoptysis] : no hemoptysis [Chest Tightness] : no chest tightness [Pleuritic Pain] : no pleuritic pain [Hypertension] : no ~T hypertension [Wheezing] : no wheezing [Hypotension] : no hypotension [Chest Discomfort] : no chest discomfort [Orthopnea] : no orthopnea [Dysrhythmia] : no dysrhythmia [Hay Fever] : no hay fever [Edema] : ~T edema was not present [Itchy Eyes] : no itching of ~T the eyes [Dysphagia] : no dysphagia [Nausea] : no nausea [Nocturia] : no nocturia [Frequency] : no change in urinary frequency [Trauma] : no ~T physical trauma [Fracture] : no fracture [Syncope] : no fainting [Dizziness] : no dizziness [Diet Meds] : not taking dietary supplements [DVT] : no DVT [Hepatic Disease] : no hepatic disease

## 2019-05-03 NOTE — PHYSICAL EXAM
[General Appearance - Well Developed] : well developed [General Appearance - Well Nourished] : well nourished [II] : II [Normal Conjunctiva] : the conjunctiva exhibited no abnormalities [Arterial Pulses Normal] : the arterial pulses were normal [Respiration, Rhythm And Depth] : normal respiratory rhythm and effort [Exaggerated Use Of Accessory Muscles For Inspiration] : no accessory muscle use [Abdomen Soft] : soft [Bowel Sounds] : normal bowel sounds [Nail Clubbing] : no clubbing of the fingernails [Abnormal Walk] : normal gait [Cyanosis, Localized] : no localized cyanosis [Petechial Hemorrhages (___cm)] : no petechial hemorrhages [] : no ischemic changes [Skin Color & Pigmentation] : normal skin color and pigmentation [Normal Oral Mucosa] : normal oral mucosa [Cranial Nerves] : cranial nerves 2-12 were intact [Normal Oropharynx] : normal oropharynx [No Anxiety] : not feeling anxious [Oriented To Time, Place, And Person] : oriented to person, place, and time [FreeTextEntry1] : No major pedal edema

## 2019-05-03 NOTE — DISCUSSION/SUMMARY
[FreeTextEntry1] : ------------Assessment and Plan---------38 year-old lady with PULMONARY ARTERIAL HYPERTENSION  S/P PPM  FOR TACHYCARDIA AT [place] ON S/Q REMODULIN - \par \par 1]   PULM ART HTN ]   --------- WHO GROUP I FUNCTIONAL CLASS III----[ AS PER  DR FREEMAN  WHO DID PULM ANGIO SHE HAS FEATURES OF GROUP I  WITH SOME EVIDENCE OF DISTAL CLOTS] She is on remodulin at 14ng- \par 2) ---She is now on xarelto---will be on lifelong anticoagulation---encouraged use of oxygen-\par 3), labs today- ? anemia --NEEDS HEMATOLOGY OPINION \par 4) S/P PPM- follows EP clinic at [place] ---NEEDS TO F/U WITH EPS SERVICE AS WELL\par 5) get records from hospital/Dr Chow \par 6) s/p medrol 20mg IM d/t wheezing- will take prednisone 10mg \par 7) rash neck/under breasts- apple metronidazole cream \par 8--F/U WITH DR FLORES IN [facility] \par \par   Patient at this time  will follow  the above mentioned recommendations --and f/u in DEC 2018----If you have any questions  I can be reached  at #  [phone]. \par

## 2019-05-03 NOTE — REASON FOR VISIT
[Family Member] : family member [Pulmonary Hypertension] : pulmonary hypertension [Follow-Up] : a follow-up visit [FreeTextEntry2] : tachycardia [FreeTextEntry1] : PAH

## 2019-05-03 NOTE — CONSULT LETTER
[Director, Pulmonary Hypertension Program] : Director, Pulmonary Hypertension Program [Vivian Yoon MD, FCCP] : Vivian Yoon MD, FCCP [Division of Pulmonary, Critical Care and Sleep Medicine] : Division of Pulmonary, Critical Care and Sleep Medicine [[facility]] : [facility] [Associate Professor of Medicine] : Associate Professor of Medicine

## 2019-05-03 NOTE — LETTER CLOSING
[Vivian Yoon MD, FCCP] : Vivian Yoon MD, FCCP [Director, Pulmonary Hypertension Program] : Director, Pulmonary Hypertension Program [Division of Pulmonary, Critical Care and Sleep Medicine] : Division of Pulmonary, Critical Care and Sleep Medicine [[facility]] : [facility] [Associate Professor of Medicine] : Associate Professor of Medicine [[facility]] : [facility]

## 2019-05-06 LAB
ALBUMIN SERPL ELPH-MCNC: 4.6 G/DL
ALP BLD-CCNC: 60 U/L
ALT SERPL-CCNC: 9 U/L
ANION GAP SERPL CALC-SCNC: 16 MMOL/L
AST SERPL-CCNC: 14 U/L
BASOPHILS # BLD AUTO: 0.07 K/UL
BASOPHILS NFR BLD AUTO: 0.7 %
BILIRUB SERPL-MCNC: 0.6 MG/DL
BUN SERPL-MCNC: 8 MG/DL
CALCIUM SERPL-MCNC: 9.2 MG/DL
CHLORIDE SERPL-SCNC: 104 MMOL/L
CO2 SERPL-SCNC: 17 MMOL/L
CREAT SERPL-MCNC: 0.97 MG/DL
EOSINOPHIL # BLD AUTO: 0.41 K/UL
EOSINOPHIL NFR BLD AUTO: 4.4 %
FERRITIN SERPL-MCNC: 6 NG/ML
GLUCOSE SERPL-MCNC: 86 MG/DL
HAPTOGLOB SERPL-MCNC: 150 MG/DL
HCG SERPL-MCNC: <1 MIU/ML
HCT VFR BLD CALC: 31.9 %
HGB BLD-MCNC: 8.7 G/DL
IMM GRANULOCYTES NFR BLD AUTO: 0.2 %
INR PPP: 1.62 RATIO
IRON SATN MFR SERPL: 3 %
IRON SERPL-MCNC: 18 UG/DL
LDH SERPL-CCNC: 307 U/L
LYMPHOCYTES # BLD AUTO: 2.14 K/UL
LYMPHOCYTES NFR BLD AUTO: 22.8 %
MAN DIFF?: NORMAL
MCHC RBC-ENTMCNC: 15.9 PG
MCHC RBC-ENTMCNC: 27.3 GM/DL
MCV RBC AUTO: 58.3 FL
MONOCYTES # BLD AUTO: 0.55 K/UL
MONOCYTES NFR BLD AUTO: 5.9 %
NEUTROPHILS # BLD AUTO: 6.2 K/UL
NEUTROPHILS NFR BLD AUTO: 66 %
PLATELET # BLD AUTO: 415 K/UL
POTASSIUM SERPL-SCNC: 4.5 MMOL/L
PROT SERPL-MCNC: 7.3 G/DL
PT BLD: 18.7 SEC
RBC # BLD: 5.47 M/UL
RBC # FLD: 21.8 %
SODIUM SERPL-SCNC: 137 MMOL/L
TIBC SERPL-MCNC: 533 UG/DL
UIBC SERPL-MCNC: 515 UG/DL
WBC # FLD AUTO: 9.39 K/UL

## 2019-05-13 LAB
ACETAMINOPHE, BLOOD: NEGATIVE UG/ML
AMPHETAMINES, BLD: NEGATIVE NG/ML
BARBITURATES, BLD: NEGATIVE UG/ML
BENZODIAZEPINES, BLD: NEGATIVE NG/ML
BUPRENORPHINE, BLOOD: NEGATIVE NG/ML
CARISOPRODOL, BLOOD: NEGATIVE UG/ML
COCAINE METABOLITE. BLD: NEGATIVE NG/ML
DRUGS ABSENT REPORTED: NORMAL
DRUGS PRESENT NOT REPORTED: NORMAL
DRUGS PRESENT REPORTED: NORMAL
ETHYL ALCOHOL, BLD: NEGATIVE GM/DL
FENTANYL, BLD: NEGATIVE NG/ML
GABAPENTIN BLOOD: NEGATIVE UG/ML
MEPERIDINE,BLOOD: NEGATIVE NG/ML
METHADONE, BLD: NEGATIVE NG/ML
OPIATES, BLOOD: NEGATIVE NG/ML
OTHER DRUGS DETECTED: NORMAL
OXYCODONE, BLOOD: NEGATIVE NG/ML
PHENCYCLIDINE, BLD: NEGATIVE NG/ML
PROPOXYPHENE, BLD: NEGATIVE NG/ML
REPORTED MEDICATIONS: NORMAL
THC (MARIJUANA) METABOLITE BLD: NEGATIVE NG/ML
TRAMADOL BLOOD: NEGATIVE NG/ML

## 2019-07-12 ENCOUNTER — MOBILE ON CALL (OUTPATIENT)
Age: 39
End: 2019-07-12

## 2019-07-23 NOTE — LETTER CLOSING
[Vivian Yoon MD, FCCP] : Vivian Yoon MD, FCCP [Director, Pulmonary Hypertension Program] : Director, Pulmonary Hypertension Program [[facility]] : [facility] [Division of Pulmonary, Critical Care and Sleep Medicine] : Division of Pulmonary, Critical Care and Sleep Medicine [Associate Professor of Medicine] : Associate Professor of Medicine [[facility]] : [facility]

## 2019-07-23 NOTE — CONSULT LETTER
[Vivian Yoon MD, FCCP] : Vivian Yoon MD, FCCP [Director, Pulmonary Hypertension Program] : Director, Pulmonary Hypertension Program [[facility]] : [facility] [Division of Pulmonary, Critical Care and Sleep Medicine] : Division of Pulmonary, Critical Care and Sleep Medicine [Associate Professor of Medicine] : Associate Professor of Medicine

## 2019-07-24 ENCOUNTER — APPOINTMENT (OUTPATIENT)
Dept: PULMONOLOGY | Facility: CLINIC | Age: 39
End: 2019-07-24
Payer: MEDICAID

## 2019-07-24 ENCOUNTER — LABORATORY RESULT (OUTPATIENT)
Age: 39
End: 2019-07-24

## 2019-07-24 VITALS
WEIGHT: 180 LBS | BODY MASS INDEX: 28.25 KG/M2 | RESPIRATION RATE: 16 BRPM | SYSTOLIC BLOOD PRESSURE: 117 MMHG | DIASTOLIC BLOOD PRESSURE: 80 MMHG | OXYGEN SATURATION: 96 % | HEIGHT: 67 IN | HEART RATE: 114 BPM | TEMPERATURE: 97.9 F

## 2019-07-24 LAB — NT-PROBNP SERPL-MCNC: 112 PG/ML

## 2019-07-24 PROCEDURE — 94618 PULMONARY STRESS TESTING: CPT

## 2019-07-24 PROCEDURE — 99215 OFFICE O/P EST HI 40 MIN: CPT | Mod: 25

## 2019-07-24 PROCEDURE — ZZZZZ: CPT

## 2019-07-24 RX ORDER — PREDNISONE 10 MG/1
10 TABLET ORAL DAILY
Qty: 14 | Refills: 0 | Status: DISCONTINUED | COMMUNITY
Start: 2019-05-03 | End: 2019-07-24

## 2019-07-24 NOTE — PHYSICAL EXAM
[General Appearance - Well Nourished] : well nourished [General Appearance - Well Developed] : well developed [Normal Conjunctiva] : the conjunctiva exhibited no abnormalities [II] : II [Arterial Pulses Normal] : the arterial pulses were normal [Respiration, Rhythm And Depth] : normal respiratory rhythm and effort [Exaggerated Use Of Accessory Muscles For Inspiration] : no accessory muscle use [Bowel Sounds] : normal bowel sounds [Abnormal Walk] : normal gait [Abdomen Soft] : soft [Nail Clubbing] : no clubbing of the fingernails [Cyanosis, Localized] : no localized cyanosis [] : no ischemic changes [Petechial Hemorrhages (___cm)] : no petechial hemorrhages [Skin Color & Pigmentation] : normal skin color and pigmentation [Cranial Nerves] : cranial nerves 2-12 were intact [Normal Oral Mucosa] : normal oral mucosa [Normal Oropharynx] : normal oropharynx [Oriented To Time, Place, And Person] : oriented to person, place, and time [No Anxiety] : not feeling anxious [FreeTextEntry1] : neck rash & rash under breasts

## 2019-07-24 NOTE — HISTORY OF PRESENT ILLNESS
[Stable] : are stable [None] : ~He/She~ has no significant interval events [Difficulty Breathing During Exertion] : stable dyspnea on exertion [Oxygen] : the patient uses supplemental oxygen [Oxygen Rate  ___ lpm] : Oxygen rate is [unfilled] lpm [Nightly] : nightly [Group I - Pulmonary Arterial HTN] : Group I - Pulmonary Arterial HTN: [Flu Vaccine] : influenza virus vaccine [FreeTextEntry1] : -----------------Patient is here for follow up of her pulmonary htn. ------------------INITIALLY  REFD  WITH   ECHO [DONE  OUTSIDE ] ELEVATED PASP  DILATED RV AND RA---------  HAS BEEN TOLD IN THE PAST SHE HAS  ASTHMA-----long-standing history of dyspnea on exertion-----------------has baseline tachycardia ----.....No history of fever, chills , rigors, chest pain, or hemoptysis. No h/o significant weight loss in last few months. No history of liver dysfunction , collagen vascular disorder or chronic thromboembolic disease. I would classify her dyspnea as WHO  FUNCTIONAL CLASS III-----------no calf tenderness----------SLEEP STUDY SHOWS AHI 0.6 but T 90  < 35 %-----DIAGNOSED  AS PAH ---------WAS ON ADMAPAS  NOW ON   SQ REMODULIN  [ SINCE MAY 2108]  ---S/P PACEMAKER PLACEMENT AT [place]  AND ON METOPROLOL------\par \par ------cardiac CATH ---- 28/ july / 2014.......PAP -76 , Aortic pressure -84, left ventricle edp 10 MMHG , rt atrium - 13, PAP after NO - 42--- CO 3.19,   PVR 1928.78 ( dsc) 24.12 ( woods) PVR after NO ( 692.51( dsc), 8.66 ( enrique)\par \par CARDIAC  CATH 2108----\par -\par --CTA CHEST---- july 23/2014--- no evidence of PE. non specific ground glass opacities throughout the lungs....moderate pericardial effusion ---\par \par --repeat CT chest WITHOUT CONTRAST  12/2014 MOSAIC PATTERN UNCHANGED  FROM BEFORE\par vq scan 6/2015 abnormal- low prob PE\par cxr-6/2015 no acute disease \par ---------CT CHEST-----1. Enlarged main pulmonary artery compatible with pulmonary \par hypertension. -2. Patchy bilateral groundglass are unchanged since 06/20/2015 and are of \par uncertain origin. ------       \par \par ECHO ....July 24/ 2014----right ventricular enlargement with decrease ventricular systolic function...estimated rt ventricle systolic pre equals 113mmhg, ---\par \par ECHO 2018----  EF80, [id number]\par \par SEPT 2104  6MWT- WALKED 301 METERS-------PSG   2014   LOW  T90 BUT NO ERICA\par \par \par \par  june 2015---SAW DR FREEMAN  AT [place]  -cath showed   elevated  pressures but  pa ANGIOgram   should only distal clts  not a candidate for  surgery for CTEPH---RA 4 mmhg, RV 46/5, PA 49/24 mean PA 33, PCWP 13   WITH SUGGESTION OF VASODILATOR  RESPONSE  [ D/W DR LUONG]------AS PER HEATH FREEMAN  SHE HAS GROUP I PAH  WITH DISTAL CLOTS S RECOMMENDED   MEDICAL TREATMENT---PT ON  PROCARDIA  AND  ADMAPAS---DID NOT TOLERATE OPSUMIT IN PAST [[-[ DUE TO LOW BP] --------- -------------------------------.\par \par ALSO ON ANTICOAGULATION-follows coumadin clinic  ------- elevated INR today DENIES BLEEDING/HEMATURIA/MELENA\par \par She reports noncompliance to adempas since 8/1/15 has  basla tachycardia f/u with DR KRISHNAMURTHY  cardilogy ---\par \par ---PFT AUG 2016------FVC 2.44, FEV 1 1.61,   TLC 4.37, mild to moderate obstructive lung defect\par ----jan 2017 6MWD 250 m---\par ---2019  6MWT    ---165 METERS\par \par --------ECHO AUG 2016-----EF 82%-----PASP 26 mmHg\par --CATH 2014 ----MEAN PA 77, LVEDP 10, CO 3.14\par \par ------CT SCAN AUG 2016-------patchy bilateral groundglass unchanged since 6/20/2015-----enlarged PA 3.5 cm-----\par CT SCAN  2018--NO PE, PA 4.0 CM,MOSAIC ATTENUATION\par \par july 2018 s/p hospitalization for syncopal episode at home. She was transferred to [facility] where  a pacemaker was inserted d/t tachycardia and syncope. She was also subsequently started on remodulin SQ by Dr Chow--current rate 12ng. She is still have dyspnea.\par \par ------OCT 2018--------s/p hospitalization at [facility]------S/P PAEMAKER PLACEMENT AT [place]------started on remodulin at [place]---ON S/Q REMODULIN \par \par \par may 2019 here for f/u visit- PAH w/remodulin @14ng infusing subcutaneously\par she is c/o fatigue, sleep disturbance and palpitations. She is wheezing today & short of breath.\par LAst hgb=8 but is not compliant to iron d/t constipation\par

## 2019-07-24 NOTE — REVIEW OF SYSTEMS
[Sinus Problems] : sinus problems [Dyspnea] : dyspnea [Palpitations] : palpitations [Headache] : headache [Anxiety] : anxiety [Panic Attacks] : panic attacks [Negative] : Sleep Disorder [Fever] : no fever [Dry Eyes] : no dryness of the eyes [Chills] : no chills [Sputum] : not coughing up ~M sputum [Eye Irritation] : no ~T irritation of the eyes [Cough] : no cough [Hemoptysis] : no hemoptysis [Chest Tightness] : no chest tightness [Wheezing] : no wheezing [Pleuritic Pain] : no pleuritic pain [Hypertension] : no ~T hypertension [Chest Discomfort] : no chest discomfort [Hypotension] : no hypotension [Dysrhythmia] : no dysrhythmia [Orthopnea] : no orthopnea [Edema] : ~T edema was not present [Itchy Eyes] : no itching of ~T the eyes [Hay Fever] : no hay fever [Dysphagia] : no dysphagia [Nausea] : no nausea [Trauma] : no ~T physical trauma [Frequency] : no change in urinary frequency [Nocturia] : no nocturia [Fracture] : no fracture [Dizziness] : no dizziness [Diet Meds] : not taking dietary supplements [Syncope] : no fainting [Hepatic Disease] : no hepatic disease [DVT] : no DVT

## 2019-07-24 NOTE — DISCUSSION/SUMMARY
[FreeTextEntry1] : ------------Assessment and Plan---------38 year-old lady with PULMONARY ARTERIAL HYPERTENSION  S/P PPM  FOR TACHYCARDIA AT [place] ON S/Q REMODULIN - \par \par 1]   PULM  ART HTN ]   --------- WHO GROUP I FUNCTIONAL CLASS III----[ AS PER  DR FREEMAN  WHO DID PULM ANGIO SHE HAS FEATURES OF GROUP I  WITH SOME EVIDENCE OF DISTAL CLOTS] She is on remodulin at 14ng-  INCCREASE TO 15NG/KG/MIN --  REPEAT ECHO \par 2) ---She is now on xarelto---will be on lifelong anticoagulation---encouraged use of oxygen-\par 3), labs today- ? anemia --NEEDS HEMATOLOGY OPINION \par 4) S/P PPM- follows EP clinic at [place] ---NEEDS TO F/U WITH EPS SERVICE AS WELL\par 5) get records from hospital/Dr Chow \par 6)CONTD will take prednisone 10mg \par 7) rash neck/under breasts- apple metronidazole cream \par 8--F/U WITH DR FLORES IN HEART FAILURE CLINIC \par \par   Patient at this time  will follow  the above mentioned recommendations --and f/u in DEC 2018----If you have any questions  I can be reached  at #  [phone]. \par

## 2019-07-25 LAB
ALBUMIN SERPL ELPH-MCNC: 4.4 G/DL
ALP BLD-CCNC: 53 U/L
ALT SERPL-CCNC: 6 U/L
ANION GAP SERPL CALC-SCNC: 13 MMOL/L
AST SERPL-CCNC: 13 U/L
BASOPHILS # BLD AUTO: 0.08 K/UL
BASOPHILS NFR BLD AUTO: 1 %
BILIRUB SERPL-MCNC: 0.4 MG/DL
BUN SERPL-MCNC: 10 MG/DL
CALCIUM SERPL-MCNC: 9.3 MG/DL
CHLORIDE SERPL-SCNC: 106 MMOL/L
CO2 SERPL-SCNC: 21 MMOL/L
CREAT SERPL-MCNC: 1.04 MG/DL
EOSINOPHIL # BLD AUTO: 0.29 K/UL
EOSINOPHIL NFR BLD AUTO: 3.6 %
FERRITIN SERPL-MCNC: 118 NG/ML
GLUCOSE SERPL-MCNC: 86 MG/DL
HCG SERPL-MCNC: <1 MIU/ML
HCT VFR BLD CALC: 35.7 %
HGB BLD-MCNC: 9.8 G/DL
IMM GRANULOCYTES NFR BLD AUTO: 0.4 %
LYMPHOCYTES # BLD AUTO: 1.83 K/UL
LYMPHOCYTES NFR BLD AUTO: 22.5 %
MAN DIFF?: NORMAL
MCHC RBC-ENTMCNC: 17.6 PG
MCHC RBC-ENTMCNC: 27.5 GM/DL
MCV RBC AUTO: 64 FL
MONOCYTES # BLD AUTO: 0.63 K/UL
MONOCYTES NFR BLD AUTO: 7.8 %
NEUTROPHILS # BLD AUTO: 5.26 K/UL
NEUTROPHILS NFR BLD AUTO: 64.7 %
PLATELET # BLD AUTO: 359 K/UL
POTASSIUM SERPL-SCNC: 4 MMOL/L
PROT SERPL-MCNC: 7.1 G/DL
RBC # BLD: 5.58 M/UL
RBC # FLD: 29.3 %
SODIUM SERPL-SCNC: 140 MMOL/L
WBC # FLD AUTO: 8.12 K/UL

## 2019-09-04 NOTE — CONSULT LETTER
[Vivian Yoon MD, FCCP] : Vivian Yoon MD, FCCP [Director, Pulmonary Hypertension Program] : Director, Pulmonary Hypertension Program [[facility]] : [facility] [Division of Pulmonary, Critical Care and Sleep Medicine] : Division of Pulmonary, Critical Care and Sleep Medicine [Associate Professor of Medicine] : Associate Professor of Medicine

## 2019-09-04 NOTE — LETTER CLOSING
[Vivian Yoon MD, FCCP] : Vivian Yoon MD, FCCP [Director, Pulmonary Hypertension Program] : Director, Pulmonary Hypertension Program [[facility]] : [facility] [Division of Pulmonary, Critical Care and Sleep Medicine] : Division of Pulmonary, Critical Care and Sleep Medicine [Associate Professor of Medicine] : Associate Professor of Medicine [[facility]] : [facility]

## 2019-09-05 ENCOUNTER — APPOINTMENT (OUTPATIENT)
Dept: PULMONOLOGY | Facility: CLINIC | Age: 39
End: 2019-09-05
Payer: MEDICAID

## 2019-09-05 ENCOUNTER — LABORATORY RESULT (OUTPATIENT)
Age: 39
End: 2019-09-05

## 2019-09-05 VITALS
SYSTOLIC BLOOD PRESSURE: 127 MMHG | DIASTOLIC BLOOD PRESSURE: 84 MMHG | WEIGHT: 182 LBS | HEIGHT: 67 IN | OXYGEN SATURATION: 95 % | HEART RATE: 94 BPM | RESPIRATION RATE: 16 BRPM | BODY MASS INDEX: 28.56 KG/M2

## 2019-09-05 DIAGNOSIS — R53.83 OTHER FATIGUE: ICD-10-CM

## 2019-09-05 PROCEDURE — 99215 OFFICE O/P EST HI 40 MIN: CPT | Mod: 25

## 2019-09-05 PROCEDURE — 36415 COLL VENOUS BLD VENIPUNCTURE: CPT

## 2019-09-05 NOTE — REASON FOR VISIT
[Family Member] : family member [Follow-Up] : a follow-up visit [Pulmonary Hypertension] : pulmonary hypertension [FreeTextEntry2] : tachycardia [FreeTextEntry1] : PAH

## 2019-09-05 NOTE — PHYSICAL EXAM
[General Appearance - Well Developed] : well developed [General Appearance - Well Nourished] : well nourished [Normal Conjunctiva] : the conjunctiva exhibited no abnormalities [II] : II [Arterial Pulses Normal] : the arterial pulses were normal [Respiration, Rhythm And Depth] : normal respiratory rhythm and effort [Exaggerated Use Of Accessory Muscles For Inspiration] : no accessory muscle use [Bowel Sounds] : normal bowel sounds [Abdomen Soft] : soft [Abnormal Walk] : normal gait [Nail Clubbing] : no clubbing of the fingernails [Cyanosis, Localized] : no localized cyanosis [Petechial Hemorrhages (___cm)] : no petechial hemorrhages [] : no ischemic changes [Skin Color & Pigmentation] : normal skin color and pigmentation [Cranial Nerves] : cranial nerves 2-12 were intact [Normal Oral Mucosa] : normal oral mucosa [Normal Oropharynx] : normal oropharynx [Oriented To Time, Place, And Person] : oriented to person, place, and time [No Anxiety] : not feeling anxious [FreeTextEntry1] : No major pedal edema

## 2019-09-05 NOTE — HISTORY OF PRESENT ILLNESS
[Stable] : are stable [None] : ~He/She~ has no significant interval events [Difficulty Breathing During Exertion] : stable dyspnea on exertion [Oxygen] : the patient uses supplemental oxygen [Oxygen Rate  ___ lpm] : Oxygen rate is [unfilled] lpm [Nightly] : nightly [Group I - Pulmonary Arterial HTN] : Group I - Pulmonary Arterial HTN: [Flu Vaccine] : influenza virus vaccine [FreeTextEntry1] : -----------------Patient is here for follow up of her pulmonary htn. ------------------INITIALLY  REFD  WITH   ECHO [DONE  OUTSIDE ] ELEVATED PASP  DILATED RV AND RA---------  HAS BEEN TOLD IN THE PAST SHE HAS  ASTHMA-----long-standing history of dyspnea on exertion-----------------has baseline tachycardia ----.....No history of fever, chills , rigors, chest pain, or hemoptysis. No h/o significant weight loss in last few months. No history of liver dysfunction , collagen vascular disorder or chronic thromboembolic disease. I would classify her dyspnea as WHO  FUNCTIONAL CLASS III-----------no calf tenderness----------SLEEP STUDY SHOWS AHI 0.6 but T 90  < 35 %-----DIAGNOSED  AS PAH ---------WAS ON ADMAPAS  NOW ON   SQ REMODULIN  [ SINCE MAY 2108]  ---S/P PACEMAKER PLACEMENT AT [place]  AND ON METOPROLOL------\par \par ------cardiac CATH ---- 28/ july / 2014.......PAP -76 , Aortic pressure -84, left ventricle edp 10 MMHG , rt atrium - 13, PAP after NO - 42--- CO 3.19,   PVR 1928.78 ( dsc) 24.12 ( woods) PVR after NO ( 692.51( dsc), 8.66 ( enrique)\par \par CARDIAC  CATH 2108----\par -\par --CTA CHEST---- july 23/2014--- no evidence of PE. non specific ground glass opacities throughout the lungs....moderate pericardial effusion ---\par \par --repeat CT chest WITHOUT CONTRAST  12/2014 MOSAIC PATTERN UNCHANGED  FROM BEFORE\par vq scan 6/2015 abnormal- low prob PE\par cxr-6/2015 no acute disease \par ---------CT CHEST-----1. Enlarged main pulmonary artery compatible with pulmonary \par hypertension. -2. Patchy bilateral groundglass are unchanged since 06/20/2015 and are of \par uncertain origin. ------       \par \par ECHO ....July 24/ 2014----right ventricular enlargement with decrease ventricular systolic function...estimated rt ventricle systolic pre equals 113mmhg, ---\par \par ECHO 2018----  EF80, [id number]\par \par SEPT 2104  6MWT- WALKED 301 METERS-------PSG   2014   LOW  T90 BUT NO ERICA\par \par \par \par  june 2015---SAW DR FREEMAN  AT [place]  -cath showed   elevated  pressures but  pa ANGIOgram   should only distal clts  not a candidate for  surgery for CTEPH---RA 4 mmhg, RV 46/5, PA 49/24 mean PA 33, PCWP 13   WITH SUGGESTION OF VASODILATOR  RESPONSE  [ D/W DR LUONG]------AS PER HEATH FREEMAN  SHE HAS GROUP I PAH  WITH DISTAL CLOTS S RECOMMENDED   MEDICAL TREATMENT---PT ON  PROCARDIA  AND  ADMAPAS---DID NOT TOLERATE OPSUMIT IN PAST [[-[ DUE TO LOW BP] --------- -------------------------------.\par \par ALSO ON ANTICOAGULATION-follows coumadin clinic  ------- elevated INR today DENIES BLEEDING/HEMATURIA/MELENA\par \par She reports noncompliance to adempas since 8/1/15 has  basla tachycardia f/u with DR KRISHNAMURTHY  cardilogy ---\par \par ---PFT AUG 2016------FVC 2.44, FEV 1 1.61,   TLC 4.37, mild to moderate obstructive lung defect\par ----jan 2017 6MWD 250 m---\par ---2019  6MWT    ---165 METERS\par \par --------ECHO AUG 2016-----EF 82%-----PASP 26 mmHg\par --CATH 2014 ----MEAN PA 77, LVEDP 10, CO 3.14\par \par ------CT SCAN AUG 2016-------patchy bilateral groundglass unchanged since 6/20/2015-----enlarged PA 3.5 cm-----\par CT SCAN  2018--NO PE, PA 4.0 CM,MOSAIC ATTENUATION\par \par july 2018 s/p hospitalization for syncopal episode at home. She was transferred to [facility] where  a pacemaker was inserted d/t tachycardia and syncope. She was also subsequently started on remodulin SQ by Dr Chow--current rate 12ng. She is still have dyspnea.\par \par ------OCT 2018--------s/p hospitalization at [facility]------S/P PAEMAKER PLACEMENT AT [place]------started on remodulin at [place]---ON S/Q REMODULIN \par \par \par sept 2019 here for f/u visit- PAH w/remodulin @15ng infusing subcutaneously\par she is c/o fatigue and palpitations.\par

## 2019-09-05 NOTE — REVIEW OF SYSTEMS
[Sinus Problems] : sinus problems [Dyspnea] : dyspnea [Palpitations] : palpitations [Headache] : headache [Anxiety] : anxiety [Panic Attacks] : panic attacks [Negative] : Sleep Disorder [Fever] : no fever [Chills] : no chills [Dry Eyes] : no dryness of the eyes [Eye Irritation] : no ~T irritation of the eyes [Cough] : no cough [Sputum] : not coughing up ~M sputum [Hemoptysis] : no hemoptysis [Chest Tightness] : no chest tightness [Pleuritic Pain] : no pleuritic pain [Wheezing] : no wheezing [Hypertension] : no ~T hypertension [Hypotension] : no hypotension [Chest Discomfort] : no chest discomfort [Orthopnea] : no orthopnea [Dysrhythmia] : no dysrhythmia [Edema] : ~T edema was not present [Hay Fever] : no hay fever [Dysphagia] : no dysphagia [Itchy Eyes] : no itching of ~T the eyes [Nausea] : no nausea [Nocturia] : no nocturia [Frequency] : no change in urinary frequency [Trauma] : no ~T physical trauma [Fracture] : no fracture [Dizziness] : no dizziness [Syncope] : no fainting [Diet Meds] : not taking dietary supplements [DVT] : no DVT [Hepatic Disease] : no hepatic disease

## 2019-09-05 NOTE — DISCUSSION/SUMMARY
[FreeTextEntry1] : ------------Assessment and Plan--------39 year-old lady with PULMONARY ARTERIAL HYPERTENSION  S/P PPM  FOR TACHYCARDIA AT [place] ON S/Q REMODULIN - \par \par 1]   PULM  ART HTN ]   --------- WHO GROUP I FUNCTIONAL CLASS III----[ AS PER  DR FREEMAN  WHO DID PULM ANGIO SHE HAS FEATURES OF GROUP I  WITH SOME EVIDENCE OF DISTAL CLOTS] She is on remodulin  15NG/KG/MIN --  REPEAT ECHO --NEESD ECHO\par 2) ---She is now on xarelto---will be on lifelong anticoagulation---encouraged use of oxygen-\par 3), labs today- ? anemia - \par 4) S/P PPM- follows EP clinic at [place] ---NEEDS TO F/U WITH EPS SERVICE AS WELL\par 5) referred to Dr Garcia- heart failure clinic\par 6) f/iu in one month\par \par   Patient at this time  will follow  the above mentioned recommendations --and f/u in DEC 2018----If you have any questions  I can be reached  at #  [phone]. \par \par Vivian Yoon MD, FCCP \par Director, Pulmonary Hypertension Program \par [facility] \par Division of Pulmonary, Critical Care and Sleep Medicine \par  Professor of Medicine \par [employer]\par \par

## 2019-09-06 LAB
ALBUMIN SERPL ELPH-MCNC: 4.3 G/DL
ALP BLD-CCNC: 62 U/L
ALT SERPL-CCNC: 11 U/L
ANION GAP SERPL CALC-SCNC: 16 MMOL/L
AST SERPL-CCNC: 25 U/L
BASOPHILS # BLD AUTO: 0.06 K/UL
BASOPHILS NFR BLD AUTO: 0.9 %
BILIRUB SERPL-MCNC: 0.4 MG/DL
BUN SERPL-MCNC: 6 MG/DL
CALCIUM SERPL-MCNC: 9 MG/DL
CHLORIDE SERPL-SCNC: 103 MMOL/L
CO2 SERPL-SCNC: 19 MMOL/L
CREAT SERPL-MCNC: 0.91 MG/DL
EOSINOPHIL # BLD AUTO: 0.27 K/UL
EOSINOPHIL NFR BLD AUTO: 4.1 %
FERRITIN SERPL-MCNC: 28 NG/ML
GLUCOSE SERPL-MCNC: 75 MG/DL
HCG SERPL-MCNC: <1 MIU/ML
HCT VFR BLD CALC: 41.4 %
HGB BLD-MCNC: 11.8 G/DL
IMM GRANULOCYTES NFR BLD AUTO: 0.3 %
LYMPHOCYTES # BLD AUTO: 1.66 K/UL
LYMPHOCYTES NFR BLD AUTO: 25.1 %
MAN DIFF?: NORMAL
MCHC RBC-ENTMCNC: 20.2 PG
MCHC RBC-ENTMCNC: 28.5 GM/DL
MCV RBC AUTO: 70.9 FL
MONOCYTES # BLD AUTO: 0.51 K/UL
MONOCYTES NFR BLD AUTO: 7.7 %
NEUTROPHILS # BLD AUTO: 4.09 K/UL
NEUTROPHILS NFR BLD AUTO: 61.9 %
PLATELET # BLD AUTO: 244 K/UL
POTASSIUM SERPL-SCNC: 4.1 MMOL/L
PROT SERPL-MCNC: 7.2 G/DL
RBC # BLD: 5.84 M/UL
RBC # FLD: 26 %
SODIUM SERPL-SCNC: 138 MMOL/L
TSH SERPL-ACNC: 2.26 UIU/ML
WBC # FLD AUTO: 6.61 K/UL

## 2019-09-17 ENCOUNTER — MEDICATION RENEWAL (OUTPATIENT)
Age: 39
End: 2019-09-17

## 2019-09-17 NOTE — CONSULT LETTER
[Vivian Yoon MD, FCCP] : Vivian Yoon MD, FCCP [Director, Pulmonary Hypertension Program] : Director, Pulmonary Hypertension Program [[facility]] : [facility] [Division of Pulmonary, Critical Care and Sleep Medicine] : Division of Pulmonary, Critical Care and Sleep Medicine [Associate Professor of Medicine] : Associate Professor of Medicine

## 2019-09-17 NOTE — LETTER CLOSING
[Vivian Yoon MD, FCCP] : Vivian Yoon MD, FCCP [Director, Pulmonary Hypertension Program] : Director, Pulmonary Hypertension Program [Division of Pulmonary, Critical Care and Sleep Medicine] : Division of Pulmonary, Critical Care and Sleep Medicine [[facility]] : [facility] [Associate Professor of Medicine] : Associate Professor of Medicine [[facility]] : [facility]

## 2019-09-18 ENCOUNTER — APPOINTMENT (OUTPATIENT)
Dept: PULMONOLOGY | Facility: CLINIC | Age: 39
End: 2019-09-18
Payer: MEDICAID

## 2019-09-18 VITALS
TEMPERATURE: 98 F | SYSTOLIC BLOOD PRESSURE: 120 MMHG | WEIGHT: 186 LBS | HEART RATE: 81 BPM | RESPIRATION RATE: 17 BRPM | DIASTOLIC BLOOD PRESSURE: 86 MMHG | OXYGEN SATURATION: 96 % | BODY MASS INDEX: 29.13 KG/M2

## 2019-09-18 PROCEDURE — 99215 OFFICE O/P EST HI 40 MIN: CPT

## 2019-09-18 NOTE — PHYSICAL EXAM
[General Appearance - Well Developed] : well developed [General Appearance - Well Nourished] : well nourished [Normal Conjunctiva] : the conjunctiva exhibited no abnormalities [II] : II [Arterial Pulses Normal] : the arterial pulses were normal [Respiration, Rhythm And Depth] : normal respiratory rhythm and effort [Exaggerated Use Of Accessory Muscles For Inspiration] : no accessory muscle use [Bowel Sounds] : normal bowel sounds [Abdomen Soft] : soft [Abnormal Walk] : normal gait [Nail Clubbing] : no clubbing of the fingernails [Cyanosis, Localized] : no localized cyanosis [Petechial Hemorrhages (___cm)] : no petechial hemorrhages [] : no ischemic changes [Skin Color & Pigmentation] : normal skin color and pigmentation [Cranial Nerves] : cranial nerves 2-12 were intact [Normal Oral Mucosa] : normal oral mucosa [Normal Oropharynx] : normal oropharynx [Oriented To Time, Place, And Person] : oriented to person, place, and time [No Anxiety] : not feeling anxious [FreeTextEntry1] : neck rash & rash under breasts

## 2019-09-18 NOTE — HISTORY OF PRESENT ILLNESS
[Stable] : are stable [None] : ~He/She~ has no significant interval events [Difficulty Breathing During Exertion] : stable dyspnea on exertion [Oxygen] : the patient uses supplemental oxygen [Oxygen Rate  ___ lpm] : Oxygen rate is [unfilled] lpm [Nightly] : nightly [Group I - Pulmonary Arterial HTN] : Group I - Pulmonary Arterial HTN: [Flu Vaccine] : influenza virus vaccine [FreeTextEntry1] : -----------------Patient is here for follow up of her pulmonary htn. ------------------INITIALLY  REFD  WITH   ECHO [DONE  OUTSIDE ] ELEVATED PASP  DILATED RV AND RA---------  HAS BEEN TOLD IN THE PAST SHE HAS  ASTHMA-----long-standing history of dyspnea on exertion-----------------has baseline tachycardia ----.....No history of fever, chills , rigors, chest pain, or hemoptysis. No h/o significant weight loss in last few months. No history of liver dysfunction , collagen vascular disorder or chronic thromboembolic disease. I would classify her dyspnea as WHO  FUNCTIONAL CLASS III-----------no calf tenderness----------SLEEP STUDY SHOWS AHI 0.6 but T 90  < 35 %-----DIAGNOSED  AS PAH ---------WAS ON ADMAPAS  NOW ON   SQ REMODULIN  [ SINCE MAY 2108]  ---S/P PACEMAKER PLACEMENT AT [place]  AND ON METOPROLOL------\par \par ------cardiac CATH ---- 28/ july / 2014.......PAP -76 , Aortic pressure -84, left ventricle edp 10 MMHG , rt atrium - 13, PAP after NO - 42--- CO 3.19,   PVR 1928.78 ( dsc) 24.12 ( woods) PVR after NO ( 692.51( dsc), 8.66 ( enrique)\par \par CARDIAC  CATH 2108----\par -\par --CTA CHEST---- july 23/2014--- no evidence of PE. non specific ground glass opacities throughout the lungs....moderate pericardial effusion ---\par \par --repeat CT chest WITHOUT CONTRAST  12/2014 MOSAIC PATTERN UNCHANGED  FROM BEFORE\par vq scan 6/2015 abnormal- low prob PE\par cxr-6/2015 no acute disease \par ---------CT CHEST-----1. Enlarged main pulmonary artery compatible with pulmonary \par hypertension. -2. Patchy bilateral groundglass are unchanged since 06/20/2015 and are of \par uncertain origin. ------       \par \par ECHO ....July 24/ 2014----right ventricular enlargement with decrease ventricular systolic function...estimated rt ventricle systolic pre equals 113mmhg, ---\par \par ECHO 2018----  EF80, [id number]\par \par SEPT 2104  6MWT- WALKED 301 METERS-------PSG   2014   LOW  T90 BUT NO ERICA\par \par \par \par  june 2015---SAW DR FREEMAN  AT [place]  -cath showed   elevated  pressures but  pa ANGIOgram   should only distal clts  not a candidate for  surgery for CTEPH---RA 4 mmhg, RV 46/5, PA 49/24 mean PA 33, PCWP 13   WITH SUGGESTION OF VASODILATOR  RESPONSE  [ D/W DR LUONG]------AS PER HEATH FREEMAN  SHE HAS GROUP I PAH  WITH DISTAL CLOTS S RECOMMENDED   MEDICAL TREATMENT---PT ON  PROCARDIA  AND  ADMAPAS---DID NOT TOLERATE OPSUMIT IN PAST [[-[ DUE TO LOW BP] --------- -------------------------------.\par \par ALSO ON ANTICOAGULATION-follows coumadin clinic  ------- elevated INR today DENIES BLEEDING/HEMATURIA/MELENA\par \par She reports noncompliance to adempas since 8/1/15 has  basla tachycardia f/u with DR KRISHNAMURTHY  cardilogy ---\par \par ---PFT AUG 2016------FVC 2.44, FEV 1 1.61,   TLC 4.37, mild to moderate obstructive lung defect\par ----jan 2017 6MWD 250 m---\par ---2019  6MWT    ---165 METERS\par \par --------ECHO AUG 2016-----EF 82%-----PASP 26 mmHg\par --CATH 2014 ----MEAN PA 77, LVEDP 10, CO 3.14\par \par ------CT SCAN AUG 2016-------patchy bilateral groundglass unchanged since 6/20/2015-----enlarged PA 3.5 cm-----\par CT SCAN  2018--NO PE, PA 4.0 CM,MOSAIC ATTENUATION\par \par july 2018 s/p hospitalization for syncopal episode at home. She was transferred to [facility] where  a pacemaker was inserted d/t tachycardia and syncope. She was also subsequently started on remodulin SQ by Dr Chow--current rate 12ng. She is still have dyspnea.\par \par ------OCT 2018--------s/p hospitalization at [facility]------S/P PAEMAKER PLACEMENT AT [place]------started on remodulin at [place]---ON S/Q REMODULIN \par \par \par may 2019 here for f/u visit- PAH w/remodulin @14ng infusing subcutaneously\par she is c/o fatigue, sleep disturbance and palpitations. She is wheezing today & short of breath.\par LAst hgb=8 but is not compliant to iron d/t constipation\par sept 2019----   PAH w/remodulin @15 ng infusing subcutaneously\par she is c/o slight  palpitations.  LAst hgb=11  but is not compliant to iron d/t constipation\par

## 2019-09-18 NOTE — REVIEW OF SYSTEMS
[Sinus Problems] : sinus problems [Dyspnea] : dyspnea [Palpitations] : palpitations [Headache] : headache [Panic Attacks] : panic attacks [Anxiety] : anxiety [Negative] : Sleep Disorder [Fever] : no fever [Dry Eyes] : no dryness of the eyes [Chills] : no chills [Eye Irritation] : no ~T irritation of the eyes [Sputum] : not coughing up ~M sputum [Cough] : no cough [Chest Tightness] : no chest tightness [Hemoptysis] : no hemoptysis [Wheezing] : no wheezing [Pleuritic Pain] : no pleuritic pain [Hypotension] : no hypotension [Hypertension] : no ~T hypertension [Chest Discomfort] : no chest discomfort [Orthopnea] : no orthopnea [Dysrhythmia] : no dysrhythmia [Edema] : ~T edema was not present [Hay Fever] : no hay fever [Itchy Eyes] : no itching of ~T the eyes [Dysphagia] : no dysphagia [Nausea] : no nausea [Nocturia] : no nocturia [Frequency] : no change in urinary frequency [Fracture] : no fracture [Trauma] : no ~T physical trauma [Dizziness] : no dizziness [Syncope] : no fainting [Diet Meds] : not taking dietary supplements [DVT] : no DVT [Hepatic Disease] : no hepatic disease

## 2019-09-18 NOTE — DISCUSSION/SUMMARY
[FreeTextEntry1] : ------------Assessment and Plan--------39 year-old lady with PULMONARY ARTERIAL HYPERTENSION  S/P PPM  FOR TACHYCARDIA AT [place] ON S/Q REMODULIN - \par \par 1]   PULM  ART HTN ]   --------- WHO GROUP I FUNCTIONAL CLASS III----[ AS PER  DR FREEMAN  WHO DID PULM ANGIO SHE HAS FEATURES OF GROUP I  WITH SOME EVIDENCE OF DISTAL CLOTS] She is on remodulin  15NG/KG/MIN --  REPEAT ECHO --NEEDS  ECHO\par 2) ---She is now on xarelto---will be on lifelong anticoagulation---encouraged use of oxygen-\par 3), labs today- ? anemia - \par 4) S/P PPM- follows EP clinic at [place] ---NEEDS TO F/U WITH EPS SERVICE AS WELL\par 5) referred to Dr Garcia- heart failure clinic\par 6) f/iu in one month\par \par   Patient at this time  will follow  the above mentioned recommendations --and f/u in DEC 2018----If you have any questions  I can be reached  at #  [phone]. \par \par Vivian Yoon MD, FCCP \par Director, Pulmonary Hypertension Program \par [facility] \par Division of Pulmonary, Critical Care and Sleep Medicine \par  Professor of Medicine \par [employer]\par \par

## 2019-10-02 ENCOUNTER — APPOINTMENT (OUTPATIENT)
Dept: CARDIOLOGY | Facility: CLINIC | Age: 39
End: 2019-10-02

## 2019-10-08 ENCOUNTER — APPOINTMENT (OUTPATIENT)
Dept: CV DIAGNOSITCS | Facility: HOSPITAL | Age: 39
End: 2019-10-08

## 2019-10-29 ENCOUNTER — APPOINTMENT (OUTPATIENT)
Dept: PULMONOLOGY | Facility: CLINIC | Age: 39
End: 2019-10-29
Payer: MEDICAID

## 2019-10-29 ENCOUNTER — MED ADMIN CHARGE (OUTPATIENT)
Age: 39
End: 2019-10-29

## 2019-10-29 VITALS
HEART RATE: 92 BPM | BODY MASS INDEX: 29.35 KG/M2 | WEIGHT: 187.38 LBS | TEMPERATURE: 97.9 F | RESPIRATION RATE: 16 BRPM | OXYGEN SATURATION: 97 % | DIASTOLIC BLOOD PRESSURE: 77 MMHG | SYSTOLIC BLOOD PRESSURE: 109 MMHG

## 2019-10-29 PROCEDURE — 90688 IIV4 VACCINE SPLT 0.5 ML IM: CPT

## 2019-10-29 PROCEDURE — 99215 OFFICE O/P EST HI 40 MIN: CPT | Mod: 25

## 2019-10-29 PROCEDURE — 36415 COLL VENOUS BLD VENIPUNCTURE: CPT

## 2019-10-29 PROCEDURE — G0008: CPT

## 2019-10-29 RX ORDER — TREPROSTINIL 50 MG/20ML
2.5 INJECTION, SOLUTION INTRAVENOUS; SUBCUTANEOUS
Qty: 1 | Refills: 11 | Status: DISCONTINUED | COMMUNITY
Start: 2018-08-03 | End: 2019-10-29

## 2019-10-29 NOTE — HISTORY OF PRESENT ILLNESS
[Stable] : are stable [None] : ~He/She~ has no significant interval events [Difficulty Breathing During Exertion] : stable dyspnea on exertion [Oxygen] : the patient uses supplemental oxygen [Oxygen Rate  ___ lpm] : Oxygen rate is [unfilled] lpm [Nightly] : nightly [Group I - Pulmonary Arterial HTN] : Group I - Pulmonary Arterial HTN: [Flu Vaccine] : influenza virus vaccine [FreeTextEntry1] : -----------------Patient is here for follow up of her pulmonary htn. ------------------INITIALLY  REFD  WITH   ECHO [DONE  OUTSIDE ] ELEVATED PASP  DILATED RV AND RA---------  HAS BEEN TOLD IN THE PAST SHE HAS  ASTHMA-----long-standing history of dyspnea on exertion-----------------has baseline tachycardia ----.....No history of fever, chills , rigors, chest pain, or hemoptysis. No h/o significant weight loss in last few months. No history of liver dysfunction , collagen vascular disorder or chronic thromboembolic disease. I would classify her dyspnea as WHO  FUNCTIONAL CLASS III-----------no calf tenderness----------SLEEP STUDY SHOWS AHI 0.6 but T 90  < 35 %-----DIAGNOSED  AS PAH ---------WAS ON ADMAPAS  NOW ON   SQ REMODULIN  [ SINCE MAY 2108]  ---S/P PACEMAKER PLACEMENT AT [place]  AND ON METOPROLOL------\par \par ------cardiac CATH ---- 28/ july / 2014.......PAP -76 , Aortic pressure -84, left ventricle edp 10 MMHG , rt atrium - 13, PAP after NO - 42--- CO 3.19,   PVR 1928.78 ( dsc) 24.12 ( woods) PVR after NO ( 692.51( dsc), 8.66 ( enrique)\par \par CARDIAC  CATH 2108----\par -\par --CTA CHEST---- july 23/2014--- no evidence of PE. non specific ground glass opacities throughout the lungs....moderate pericardial effusion ---\par \par --repeat CT chest WITHOUT CONTRAST  12/2014 MOSAIC PATTERN UNCHANGED  FROM BEFORE\par vq scan 6/2015 abnormal- low prob PE\par cxr-6/2015 no acute disease \par ---------CT CHEST-----1. Enlarged main pulmonary artery compatible with pulmonary \par hypertension. -2. Patchy bilateral groundglass are unchanged since 06/20/2015 and are of \par uncertain origin. ------       \par \par ECHO ....July 24/ 2014----right ventricular enlargement with decrease ventricular systolic function...estimated rt ventricle systolic pre equals 113mmhg, ---\par \par ECHO 2018----  EF80, [id number]\par \par SEPT 2104  6MWT- WALKED 301 METERS-------PSG   2014   LOW  T90 BUT NO ERICA\par \par \par \par  june 2015---SAW DR FREEMAN  AT [place]  -cath showed   elevated  pressures but  pa ANGIOgram   should only distal clts  not a candidate for  surgery for CTEPH---RA 4 mmhg, RV 46/5, PA 49/24 mean PA 33, PCWP 13   WITH SUGGESTION OF VASODILATOR  RESPONSE  [ D/W DR LUONG]------AS PER HEATH FREEMAN  SHE HAS GROUP I PAH  WITH DISTAL CLOTS S RECOMMENDED   MEDICAL TREATMENT---PT ON  PROCARDIA  AND  ADMAPAS---DID NOT TOLERATE OPSUMIT IN PAST [[-[ DUE TO LOW BP] --------- -------------------------------.\par \par ALSO ON ANTICOAGULATION-follows coumadin clinic  ------- elevated INR today DENIES BLEEDING/HEMATURIA/MELENA\par \par She reports noncompliance to adempas since 8/1/15 has  basla tachycardia f/u with DR KRISHNAMURTHY  cardilogy ---\par \par ---PFT AUG 2016------FVC 2.44, FEV 1 1.61,   TLC 4.37, mild to moderate obstructive lung defect\par ----jan 2017 6MWD 250 m---\par ---2019  6MWT    ---165 METERS\par \par --------ECHO AUG 2016-----EF 82%-----PASP 26 mmHg\par --CATH 2014 ----MEAN PA 77, LVEDP 10, CO 3.14\par \par ------CT SCAN AUG 2016-------patchy bilateral groundglass unchanged since 6/20/2015-----enlarged PA 3.5 cm-----\par CT SCAN  2018--NO PE, PA 4.0 CM,MOSAIC ATTENUATION\par \par july 2018 s/p hospitalization for syncopal episode at home. She was transferred to [facility] where  a pacemaker was inserted d/t tachycardia and syncope. She was also subsequently started on remodulin SQ by Dr Chow--current rate 12ng. She is still have dyspnea.\par \par ------OCT 2018--------s/p hospitalization at [facility]------S/P PAEMAKER PLACEMENT AT [place]------\par \par october 2019 here for f/u visit- PAH w/remodulin @15ng infusing subcutaneously\par she is c/o chronic  palpitations, slightly improved. She is consulting w/Dr Garcia tomorrow\par PAH- remodulin @15ng SQ- minimal jaw pain. Resp wise she is stable.\par Awaiting consult w/[place] lung transplant team next month\par \par

## 2019-10-29 NOTE — REVIEW OF SYSTEMS
[Sinus Problems] : sinus problems [Dyspnea] : dyspnea [Palpitations] : palpitations [Headache] : headache [Anxiety] : anxiety [Panic Attacks] : panic attacks [Negative] : Sleep Disorder [Fever] : no fever [Chills] : no chills [Dry Eyes] : no dryness of the eyes [Eye Irritation] : no ~T irritation of the eyes [Cough] : no cough [Sputum] : not coughing up ~M sputum [Hemoptysis] : no hemoptysis [Chest Tightness] : no chest tightness [Pleuritic Pain] : no pleuritic pain [Wheezing] : no wheezing [Hypertension] : no ~T hypertension [Hypotension] : no hypotension [Chest Discomfort] : no chest discomfort [Orthopnea] : no orthopnea [Dysrhythmia] : no dysrhythmia [Edema] : ~T edema was not present [Hay Fever] : no hay fever [Itchy Eyes] : no itching of ~T the eyes [Dysphagia] : no dysphagia [Nausea] : no nausea [Nocturia] : no nocturia [Frequency] : no change in urinary frequency [Trauma] : no ~T physical trauma [Fracture] : no fracture [Dizziness] : no dizziness [Syncope] : no fainting [Diet Meds] : not taking dietary supplements [DVT] : no DVT [Hepatic Disease] : no hepatic disease

## 2019-10-29 NOTE — LETTER CLOSING
[Vivian Yoon MD, FCCP] : Vivian Yoon MD, FCCP [Director, Pulmonary Hypertension Program] : Director, Pulmonary Hypertension Program [Health system] : Health system [Division of Pulmonary, Critical Care and Sleep Medicine] : Division of Pulmonary, Critical Care and Sleep Medicine [Associate Professor of Medicine] : Associate Professor of Medicine [[facility]] : [facility]

## 2019-10-29 NOTE — CONSULT LETTER
[Vivian Yoon MD, FCCP] : Vivian Yoon MD, FCCP [Director, Pulmonary Hypertension Program] : Director, Pulmonary Hypertension Program [[facility]] : [facility] [Division of Pulmonary, Critical Care and Sleep Medicine] : Division of Pulmonary, Critical Care and Sleep Medicine [Associate Professor of Medicine] : Associate Professor of Medicine

## 2019-10-29 NOTE — PHYSICAL EXAM
[General Appearance - Well Developed] : well developed [General Appearance - Well Nourished] : well nourished [Normal Conjunctiva] : the conjunctiva exhibited no abnormalities [II] : II [Arterial Pulses Normal] : the arterial pulses were normal [Respiration, Rhythm And Depth] : normal respiratory rhythm and effort [Exaggerated Use Of Accessory Muscles For Inspiration] : no accessory muscle use [Bowel Sounds] : normal bowel sounds [Abdomen Soft] : soft [Abnormal Walk] : normal gait [Nail Clubbing] : no clubbing of the fingernails [Cyanosis, Localized] : no localized cyanosis [Petechial Hemorrhages (___cm)] : no petechial hemorrhages [] : no ischemic changes [Skin Color & Pigmentation] : normal skin color and pigmentation [Cranial Nerves] : cranial nerves 2-12 were intact [Normal Oral Mucosa] : normal oral mucosa [Normal Oropharynx] : normal oropharynx [Oriented To Time, Place, And Person] : oriented to person, place, and time [No Anxiety] : not feeling anxious [Auscultation Breath Sounds / Voice Sounds] : lungs were clear to auscultation bilaterally [FreeTextEntry1] : No major pedal edema

## 2019-10-30 ENCOUNTER — NON-APPOINTMENT (OUTPATIENT)
Age: 39
End: 2019-10-30

## 2019-10-30 ENCOUNTER — APPOINTMENT (OUTPATIENT)
Dept: CARDIOLOGY | Facility: CLINIC | Age: 39
End: 2019-10-30
Payer: MEDICAID

## 2019-10-30 VITALS
DIASTOLIC BLOOD PRESSURE: 83 MMHG | OXYGEN SATURATION: 100 % | SYSTOLIC BLOOD PRESSURE: 124 MMHG | HEART RATE: 100 BPM | HEIGHT: 67 IN | RESPIRATION RATE: 12 BRPM | BODY MASS INDEX: 30.76 KG/M2 | WEIGHT: 196 LBS

## 2019-10-30 DIAGNOSIS — Z87.898 PERSONAL HISTORY OF OTHER SPECIFIED CONDITIONS: ICD-10-CM

## 2019-10-30 DIAGNOSIS — I31.3 PERICARDIAL EFFUSION (NONINFLAMMATORY): ICD-10-CM

## 2019-10-30 DIAGNOSIS — Z87.09 PERSONAL HISTORY OF OTHER DISEASES OF THE RESPIRATORY SYSTEM: ICD-10-CM

## 2019-10-30 DIAGNOSIS — R93.89 ABNORMAL FINDINGS ON DIAGNOSTIC IMAGING OF OTHER SPECIFIED BODY STRUCTURES: ICD-10-CM

## 2019-10-30 LAB
ALBUMIN SERPL ELPH-MCNC: 4.4 G/DL
ALP BLD-CCNC: 62 U/L
ALT SERPL-CCNC: 6 U/L
ANION GAP SERPL CALC-SCNC: 11 MMOL/L
AST SERPL-CCNC: 13 U/L
BASOPHILS # BLD AUTO: 0.07 K/UL
BASOPHILS NFR BLD AUTO: 1 %
BILIRUB SERPL-MCNC: 0.3 MG/DL
BUN SERPL-MCNC: 10 MG/DL
CALCIUM SERPL-MCNC: 9 MG/DL
CHLORIDE SERPL-SCNC: 106 MMOL/L
CO2 SERPL-SCNC: 22 MMOL/L
CREAT SERPL-MCNC: 0.89 MG/DL
EOSINOPHIL # BLD AUTO: 0.17 K/UL
EOSINOPHIL NFR BLD AUTO: 2.5 %
GLUCOSE SERPL-MCNC: 80 MG/DL
HCT VFR BLD CALC: 39.2 %
HGB BLD-MCNC: 11.4 G/DL
IMM GRANULOCYTES NFR BLD AUTO: 0.1 %
LYMPHOCYTES # BLD AUTO: 2.03 K/UL
LYMPHOCYTES NFR BLD AUTO: 29.8 %
MAN DIFF?: NORMAL
MCHC RBC-ENTMCNC: 20.5 PG
MCHC RBC-ENTMCNC: 29.1 GM/DL
MCV RBC AUTO: 70.4 FL
MONOCYTES # BLD AUTO: 0.48 K/UL
MONOCYTES NFR BLD AUTO: 7 %
NEUTROPHILS # BLD AUTO: 4.05 K/UL
NEUTROPHILS NFR BLD AUTO: 59.6 %
PLATELET # BLD AUTO: 399 K/UL
POTASSIUM SERPL-SCNC: 4.4 MMOL/L
PROT SERPL-MCNC: 7.1 G/DL
RBC # BLD: 5.57 M/UL
RBC # FLD: 17 %
SODIUM SERPL-SCNC: 139 MMOL/L
WBC # FLD AUTO: 6.81 K/UL

## 2019-10-30 PROCEDURE — 99215 OFFICE O/P EST HI 40 MIN: CPT

## 2019-10-30 RX ORDER — ERGOCALCIFEROL 1.25 MG/1
1.25 MG CAPSULE, LIQUID FILLED ORAL
Qty: 4 | Refills: 2 | Status: DISCONTINUED | COMMUNITY
Start: 2019-04-17 | End: 2019-10-30

## 2019-10-30 RX ORDER — IBUPROFEN 600 MG/1
600 TABLET, FILM COATED ORAL TWICE DAILY
Qty: 14 | Refills: 0 | Status: DISCONTINUED | COMMUNITY
Start: 2018-10-08 | End: 2019-10-30

## 2019-10-30 RX ORDER — FLUTICASONE PROPIONATE 50 UG/1
50 SPRAY, METERED NASAL
Qty: 1 | Refills: 0 | Status: DISCONTINUED | COMMUNITY
Start: 2017-01-26 | End: 2019-10-30

## 2019-10-30 RX ORDER — METRONIDAZOLE 7.5 MG/G
0.75 CREAM TOPICAL TWICE DAILY
Qty: 1 | Refills: 0 | Status: DISCONTINUED | COMMUNITY
Start: 2019-05-03 | End: 2019-10-30

## 2019-10-30 RX ORDER — METOPROLOL SUCCINATE 25 MG/1
25 TABLET, EXTENDED RELEASE ORAL
Refills: 0 | Status: DISCONTINUED | COMMUNITY
End: 2019-10-30

## 2019-10-30 RX ORDER — ONDANSETRON 8 MG/1
8 TABLET, ORALLY DISINTEGRATING ORAL
Qty: 60 | Refills: 0 | Status: DISCONTINUED | COMMUNITY
Start: 2019-09-18 | End: 2019-10-30

## 2019-10-30 RX ORDER — DIPHENHYDRAMINE HCL 25 MG/1
25 CAPSULE ORAL AT BEDTIME
Qty: 30 | Refills: 0 | Status: DISCONTINUED | COMMUNITY
Start: 2019-05-03 | End: 2019-10-30

## 2019-10-30 RX ORDER — DIPHENHYDRAMINE HCL 25 MG/1
25 TABLET ORAL AT BEDTIME
Qty: 30 | Refills: 0 | Status: DISCONTINUED | COMMUNITY
Start: 2019-05-03 | End: 2019-10-30

## 2019-10-30 RX ORDER — GEL BASE NO.117
GEL (GRAM) TRANSDERMAL
Qty: 1 | Refills: 11 | Status: DISCONTINUED | OUTPATIENT
Start: 2018-11-15 | End: 2019-10-30

## 2019-10-30 NOTE — REASON FOR VISIT
[Consultation] : a consultation regarding [Cardiomyopathy] : cardiomyopathy [Heart Failure] : congestive heart failure

## 2019-10-31 NOTE — PHYSICAL EXAM
[General Appearance - Well Developed] : well developed [General Appearance - Well Nourished] : well nourished [Normal Conjunctiva] : the conjunctiva exhibited no abnormalities [Normal Oral Mucosa] : normal oral mucosa [Heart Rate And Rhythm] : heart rate and rhythm were normal [Heart Sounds] : normal S1 and S2 [Arterial Pulses Normal] : the arterial pulses were normal [Edema] : no peripheral edema present [Respiration, Rhythm And Depth] : normal respiratory rhythm and effort [Auscultation Breath Sounds / Voice Sounds] : lungs were clear to auscultation bilaterally [Bowel Sounds] : normal bowel sounds [Abdomen Soft] : soft [Abnormal Walk] : normal gait [Nail Clubbing] : no clubbing of the fingernails [Cyanosis, Localized] : no localized cyanosis [Skin Color & Pigmentation] : normal skin color and pigmentation [Skin Turgor] : normal skin turgor [] : no rash [Oriented To Time, Place, And Person] : oriented to person, place, and time [Impaired Insight] : insight and judgment were intact [No Anxiety] : not feeling anxious [FreeTextEntry1] : Normal JVP without HJR.

## 2019-10-31 NOTE — DISCUSSION/SUMMARY
[FreeTextEntry1] : - I would continue to increase the IV treprostinil barring development of side effects. She is still on a relatively low dose of therapy. It is reassuring that her symptoms have appeared to improve with therapy up to this point. If she develops worsening tachycardia or development of adverse symptoms related to uptitration, I would add either an ERA or a PDE5I. \par - Repeat echocardiogram in January per Dr. Yoon's order. \par - Follow-up with me in February. \par - I will discuss my suggestions with Dr. Yoon.

## 2019-10-31 NOTE — HISTORY OF PRESENT ILLNESS
[FreeTextEntry1] : Ms. Liz Conway is a 39 year old woman who developed shortness of breath approximately 6 years ago. Around five years ago she was diagnosed with pulmonary hypertension. She was found to have some evidence of distal pulmonary emboli and had previously been on therapy with riociquat. She had a prolonged hospitalization in May of 2018, after she developed syncope. She was found to have tachycardia of unclear etiology with associated hypotension requiring vasopressors. She was ultimately transferred to [place] for transplant evaluation. There she was started on IV treprostinil. Uptitrations of the treprostinil have been modest and she is currently on 16 ng/kg/min. Her primary complaint with the treprostinil has been jaw pain, but reasonably controlled.  \par \par Currently, she is not very active. Her primary complaint recently is related to tachycardia, that is exertional, and associated with shortness of breath. She has had multiple ED visits due to tachycardia. She says that her heart rate can increase to 200 bpm, but will slowly decrease. She has not recently suffered from syncope. \par \par She says that she is not particularly short of breath, but does not walk significant distances. She is occasionally dizzy, especially with bending over. She complains of nausea that has not been responsive to antiemetics. Her 6 minute walk test in July was only 167 meters. She notes that she has had some improvement in her symptoms over the last year, with concomitant decrease in the frequency of tachycardic episodes.\par \par I reviewed the following studies:\par \par Echocardiogram from May 21, 2018, showing LVDD of 3.8 cm with estimated LVEF of 80%. The wall thickness was augusto. The left atrium was small. The septum was flat in systole and in diastole. The right ventricle was increased in size and with decreased RV systolic function.  There was mild tricuspid regurgitation. Estimated PASP was 118 mmHg assuming right atrial pressure of 8 mmHg. There was no pericardial effusion. \par \par RHC from 5/4/2018, showing heart rate of 88 bpm, with RAP of 4, PAP 88/31 with mean of 56, PCWP of 6, and PA saturation of 61% for CO/CI of 5.7/2.8. PVR was 8.6WU.\par \par EKG from April 26th, 2018, showing normal sinus rhythm with RVH. T waves were negative across the precordium. \par \par Chest X-ray from June 2015, showing no effusion or infiltrate. \par \par Chest CT from 5/2/2018, showing resolution of previously seen bilateral airspace consolidations. She had scattered ground glass opacities with a mosaic pattern. There was no pleural effusion. There was no mediastinal or hilar lymphadenopathy. There was no evidence of pulmonary embolism.\par \par PFTs from 2016 showing FEV/FVC of 66% predicted with FEV1 of 56% predicted. FVC was 70% predicted. DLCO was 55% predicted. This was consistent with mild to moderate obstructive ventilatory defect with hyperinflation and decreased DLCO. \par \par Labs from 10/29/2019, showing hematocrit of 39%, platelets of 399, sodium of 139, creatinine of 0.89, and ALT of 6. Bilirubin was 0.3. \par \par BNP in July 2019 was 112 (as high as 817 in April 2018. \par \par HIV was nonreactive in 2014. \par

## 2019-10-31 NOTE — ASSESSMENT
[FreeTextEntry1] : Ms. Conway is a 39 year old woman with pulmonary arterial hypertension, WHO group I, with chronic right ventricular systolic heart failure. She currently is euvolemic and normotensive. She has poor functional capacity and is NYHA class III. She is currently on IV treprostinil at 16 ng/kg/min. She has periodic and exertional tachycardia, but has had some improvement in symptoms and frequency of symptomatic tachycardia over the last year. While her REVEAL risk score is only 6, consistent with a predicated 1-year survival of >95%, she has concerning risk factors of persistent tachycardia, exercise intolerance, and RV enlargement on echocardiogram.

## 2019-11-06 ENCOUNTER — CHART COPY (OUTPATIENT)
Age: 39
End: 2019-11-06

## 2019-11-12 ENCOUNTER — MEDICATION RENEWAL (OUTPATIENT)
Age: 39
End: 2019-11-12

## 2019-11-14 ENCOUNTER — APPOINTMENT (OUTPATIENT)
Dept: PULMONOLOGY | Facility: CLINIC | Age: 39
End: 2019-11-14
Payer: MEDICAID

## 2019-11-14 ENCOUNTER — MED ADMIN CHARGE (OUTPATIENT)
Age: 39
End: 2019-11-14

## 2019-11-14 VITALS
RESPIRATION RATE: 15 BRPM | WEIGHT: 194 LBS | OXYGEN SATURATION: 93 % | HEIGHT: 67 IN | BODY MASS INDEX: 30.45 KG/M2 | SYSTOLIC BLOOD PRESSURE: 120 MMHG | DIASTOLIC BLOOD PRESSURE: 81 MMHG | HEART RATE: 94 BPM | TEMPERATURE: 97.3 F

## 2019-11-14 PROCEDURE — 99215 OFFICE O/P EST HI 40 MIN: CPT | Mod: 25

## 2019-11-14 PROCEDURE — 96372 THER/PROPH/DIAG INJ SC/IM: CPT

## 2019-11-14 RX ORDER — METHYLPREDNISOLONE 40 MG/ML
40 INJECTION, POWDER, LYOPHILIZED, FOR SOLUTION INTRAMUSCULAR; INTRAVENOUS
Qty: 1 | Refills: 0 | Status: COMPLETED | OUTPATIENT
Start: 2019-11-14

## 2019-11-14 RX ADMIN — METHYLPREDNISOLONE SODIUM SUCCINATE 1 MG: 40 INJECTION, POWDER, FOR SOLUTION INTRAMUSCULAR; INTRAVENOUS at 00:00

## 2019-11-14 NOTE — LETTER CLOSING
[Vivian Yoon MD, FCCP] : Vivian Yoon MD, FCCP [[facility]] : [facility] [Director, Pulmonary Hypertension Program] : Director, Pulmonary Hypertension Program [Associate Professor of Medicine] : Associate Professor of Medicine [Division of Pulmonary, Critical Care and Sleep Medicine] : Division of Pulmonary, Critical Care and Sleep Medicine [[facility]] : [facility]

## 2019-11-14 NOTE — CONSULT LETTER
[Vivian Yoon MD, FCCP] : Vivian Yoon MD, FCCP [Director, Pulmonary Hypertension Program] : Director, Pulmonary Hypertension Program [Division of Pulmonary, Critical Care and Sleep Medicine] : Division of Pulmonary, Critical Care and Sleep Medicine [[facility]] : [facility] [Associate Professor of Medicine] : Associate Professor of Medicine

## 2019-11-14 NOTE — HISTORY OF PRESENT ILLNESS
[Stable] : are stable [None] : ~He/She~ has no significant interval events [Difficulty Breathing During Exertion] : stable dyspnea on exertion [Oxygen] : the patient uses supplemental oxygen [Oxygen Rate  ___ lpm] : Oxygen rate is [unfilled] lpm [Group I - Pulmonary Arterial HTN] : Group I - Pulmonary Arterial HTN: [Nightly] : nightly [Flu Vaccine] : influenza virus vaccine [FreeTextEntry1] : -----------------Patient is here for follow up of her pulmonary htn. ------------------INITIALLY  REFD  WITH   ECHO [DONE  OUTSIDE ] ELEVATED PASP  DILATED RV AND RA---------  HAS BEEN TOLD IN THE PAST SHE HAS  ASTHMA-----long-standing history of dyspnea on exertion-----------------has baseline tachycardia ----.....No history of fever, chills , rigors, chest pain, or hemoptysis. No h/o significant weight loss in last few months. No history of liver dysfunction , collagen vascular disorder or chronic thromboembolic disease. I would classify her dyspnea as WHO  FUNCTIONAL CLASS III-----------no calf tenderness----------SLEEP STUDY SHOWS AHI 0.6 but T 90  < 35 %-----DIAGNOSED  AS PAH ---------WAS ON ADMAPAS  NOW ON   SQ REMODULIN  [ SINCE MAY 2108]  ---S/P PACEMAKER PLACEMENT AT [place]  AND ON METOPROLOL------\par \par ------cardiac CATH ---- 28/ july / 2014.......PAP -76 , Aortic pressure -84, left ventricle edp 10 MMHG , rt atrium - 13, PAP after NO - 42--- CO 3.19,   PVR 1928.78 ( dsc) 24.12 ( woods) PVR after NO ( 692.51( dsc), 8.66 ( enrique)\par \par CARDIAC  CATH 2108----\par -\par --CTA CHEST---- july 23/2014--- no evidence of PE. non specific ground glass opacities throughout the lungs....moderate pericardial effusion ---\par \par --repeat CT chest WITHOUT CONTRAST  12/2014 MOSAIC PATTERN UNCHANGED  FROM BEFORE\par vq scan 6/2015 abnormal- low prob PE\par cxr-6/2015 no acute disease \par ---------CT CHEST-----1. Enlarged main pulmonary artery compatible with pulmonary \par hypertension. -2. Patchy bilateral groundglass are unchanged since 06/20/2015 and are of \par uncertain origin. ------       \par \par ECHO ....July 24/ 2014----right ventricular enlargement with decrease ventricular systolic function...estimated rt ventricle systolic pre equals 113mmhg, ---\par \par ECHO 2018----  EF80, [id number]\par \par SEPT 2104  6MWT- WALKED 301 METERS-------PSG   2014   LOW  T90 BUT NO ERICA\par \par \par \par  june 2015---SAW DR FREEMAN  AT [place]  -cath showed   elevated  pressures but  pa ANGIOgram   should only distal clts  not a candidate for  surgery for CTEPH---RA 4 mmhg, RV 46/5, PA 49/24 mean PA 33, PCWP 13   WITH SUGGESTION OF VASODILATOR  RESPONSE  [ D/W DR LUONG]------AS PER HEATH FREEMAN  SHE HAS GROUP I PAH  WITH DISTAL CLOTS S RECOMMENDED   MEDICAL TREATMENT---PT ON  PROCARDIA  AND  ADMAPAS---DID NOT TOLERATE OPSUMIT IN PAST [[-[ DUE TO LOW BP] --------- -------------------------------.\par \par ALSO ON ANTICOAGULATION-follows coumadin clinic  ------- elevated INR today DENIES BLEEDING/HEMATURIA/MELENA\par \par She reports noncompliance to adempas since 8/1/15 has  basla tachycardia f/u with DR KRISHNAMURTHY  cardilogy ---\par \par ---PFT AUG 2016------FVC 2.44, FEV 1 1.61,   TLC 4.37, mild to moderate obstructive lung defect\par ----jan 2017 6MWD 250 m---\par ---2019  6MWT    ---165 METERS\par \par --------ECHO AUG 2016-----EF 82%-----PASP 26 mmHg\par --CATH 2014 ----MEAN PA 77, LVEDP 10, CO 3.14\par \par ------CT SCAN AUG 2016-------patchy bilateral groundglass unchanged since 6/20/2015-----enlarged PA 3.5 cm-----\par CT SCAN  2018--NO PE, PA 4.0 CM,MOSAIC ATTENUATION\par \par july 2018 s/p hospitalization for syncopal episode at home. She was transferred to [facility] where  a pacemaker was inserted d/t tachycardia and syncope. She was also subsequently started on remodulin SQ by Dr Chow--current rate 12ng. She is still have dyspnea.\par \par ------OCT 2018--------s/p hospitalization at [facility]------S/P PAEMAKER PLACEMENT AT [place]------\par \par november 2019 here for acute visit- PAH w/remodulin @16ng infusing subcutaneously\par she is c/o chronic  palpitations, slightly improved.\par comes in today for cough and wheezing- started zpack and prednisone 2 days ago\par \par

## 2019-11-14 NOTE — REASON FOR VISIT
[Acute] : an acute visit [Family Member] : family member [Follow-Up] : a follow-up visit [Pulmonary Hypertension] : pulmonary hypertension [FreeTextEntry2] : tachycardia [FreeTextEntry1] : PAH

## 2019-11-14 NOTE — REVIEW OF SYSTEMS
[Sinus Problems] : sinus problems [Dyspnea] : dyspnea [Palpitations] : palpitations [Headache] : headache [Anxiety] : anxiety [Panic Attacks] : panic attacks [Negative] : Sleep Disorder [Chills] : no chills [Fever] : no fever [Eye Irritation] : no ~T irritation of the eyes [Dry Eyes] : no dryness of the eyes [Sputum] : not coughing up ~M sputum [Cough] : no cough [Hemoptysis] : no hemoptysis [Chest Tightness] : no chest tightness [Pleuritic Pain] : no pleuritic pain [Wheezing] : no wheezing [Hypertension] : no ~T hypertension [Hypotension] : no hypotension [Chest Discomfort] : no chest discomfort [Orthopnea] : no orthopnea [Dysrhythmia] : no dysrhythmia [Edema] : ~T edema was not present [Itchy Eyes] : no itching of ~T the eyes [Hay Fever] : no hay fever [Dysphagia] : no dysphagia [Nausea] : no nausea [Nocturia] : no nocturia [Frequency] : no change in urinary frequency [Trauma] : no ~T physical trauma [Fracture] : no fracture [Syncope] : no fainting [Dizziness] : no dizziness [DVT] : no DVT [Hepatic Disease] : no hepatic disease [Diet Meds] : not taking dietary supplements

## 2019-11-14 NOTE — PHYSICAL EXAM
[General Appearance - Well Developed] : well developed [General Appearance - Well Nourished] : well nourished [Normal Conjunctiva] : the conjunctiva exhibited no abnormalities [II] : II [Arterial Pulses Normal] : the arterial pulses were normal [Exaggerated Use Of Accessory Muscles For Inspiration] : no accessory muscle use [Respiration, Rhythm And Depth] : normal respiratory rhythm and effort [Auscultation Breath Sounds / Voice Sounds] : lungs were clear to auscultation bilaterally [Bowel Sounds] : normal bowel sounds [Abdomen Soft] : soft [Abnormal Walk] : normal gait [Nail Clubbing] : no clubbing of the fingernails [Cyanosis, Localized] : no localized cyanosis [Petechial Hemorrhages (___cm)] : no petechial hemorrhages [] : no ischemic changes [Skin Color & Pigmentation] : normal skin color and pigmentation [Cranial Nerves] : cranial nerves 2-12 were intact [Normal Oropharynx] : normal oropharynx [Normal Oral Mucosa] : normal oral mucosa [Oriented To Time, Place, And Person] : oriented to person, place, and time [No Anxiety] : not feeling anxious [FreeTextEntry1] : neck rash & rash under breasts

## 2019-11-14 NOTE — DISCUSSION/SUMMARY
[FreeTextEntry1] : ------------Assessment and Plan--------39 year-old lady with PULMONARY ARTERIAL HYPERTENSION  S/P PPM  FOR TACHYCARDIA AT [place] ON S/Q REMODULIN - \par \par 1]   PULM  ART HTN ]   --------- WHO GROUP I FUNCTIONAL CLASS III----[ AS PER  DR FREEMAN  WHO DID PULM ANGIO SHE HAS FEATURES OF GROUP I  WITH SOME EVIDENCE OF DISTAL CLOTS] She is on remodulin   16ng--  WILL INCREASE IT TO 17 NG/KG/MIN  ONCE URTI IS RESOLVED- -NEEDS repeat  ECHO\par 2) ---She is now on xarelto---will be on lifelong anticoagulation---encouraged use of oxygen-\par 3), ---- URI-  complete zpack and prednisone s/p medrol 40mg in office -TAPER PREDNSISONE  SLOWLY\par 4) S/P PPM- follows EP clinic at [place] ---NEEDS TO F/U WITH EPS SERVICE AS WELL\par 5) chronic palpitations- referred to Dr Garcia- heart failure clinic\par 6) needs yearly CT chest and PFT\par 7) she has features of ERICA- including sleep disturbance, nocturnal desat, and excessive fatigue- will get HST to r/o ERICA\par 8) influenza vac given\par 9) has consult /[place] [facility] Transplant Team next month\par 10) f/u in 2-3 months\par \par   Patient at this time  will follow  the above mentioned recommendations --and f/u in DEC 2018----If you have any questions  I can be reached  at #  [phone]. \par \par Vivian Yoon MD, FCCP \par Director, Pulmonary Hypertension Program \par [facility] \par Division of Pulmonary, Critical Care and Sleep Medicine \par  Professor of Medicine \par [employer]\par \par

## 2019-11-21 ENCOUNTER — APPOINTMENT (OUTPATIENT)
Dept: CV DIAGNOSITCS | Facility: HOSPITAL | Age: 39
End: 2019-11-21

## 2019-11-21 ENCOUNTER — OUTPATIENT (OUTPATIENT)
Dept: OUTPATIENT SERVICES | Facility: HOSPITAL | Age: 39
LOS: 1 days | End: 2019-11-21
Payer: MEDICAID

## 2019-11-21 DIAGNOSIS — Z98.51 TUBAL LIGATION STATUS: Chronic | ICD-10-CM

## 2019-11-21 DIAGNOSIS — I27.20 PULMONARY HYPERTENSION, UNSPECIFIED: ICD-10-CM

## 2019-11-21 PROCEDURE — 93306 TTE W/DOPPLER COMPLETE: CPT | Mod: 26

## 2019-11-21 PROCEDURE — 93306 TTE W/DOPPLER COMPLETE: CPT

## 2019-12-12 ENCOUNTER — RX RENEWAL (OUTPATIENT)
Age: 39
End: 2019-12-12

## 2019-12-16 ENCOUNTER — MOBILE ON CALL (OUTPATIENT)
Age: 39
End: 2019-12-16

## 2019-12-20 ENCOUNTER — RX CHANGE (OUTPATIENT)
Age: 39
End: 2019-12-20

## 2019-12-23 ENCOUNTER — APPOINTMENT (OUTPATIENT)
Dept: PULMONOLOGY | Facility: CLINIC | Age: 39
End: 2019-12-23

## 2020-01-02 NOTE — PHYSICAL THERAPY INITIAL EVALUATION ADULT - IMPAIRED TRANSFERS: SIT/STAND, REHAB EVAL
- Increase fluids and obtain sufficient rest please.     - Acetaminophen (Tylenol), over the counter 250-500 mg every 6-8 hours as needed for fever, aches or chills.     - Salt water gargles, as needed for sore throat, through out the day.     - OTC nasal decongestant as directed, as needed; take Corcidin HBP if have history of hypertension.     - Saline nasal rinse or Lizz Pot to relieve nasal congestion, as needed daily for the next 2 -3 weeks, and then as needed.     - Do not share eating &/or drinking utensils.     - Wash hands frequently, especially after coughing, sneezing, blowing nose.     - Please follow up if you develop fevers, chills, cough, shortness of breath, and or chest discomfort.     - Take a big spoonful of honey right before laying down for throat irritation and cough.     - Please start Azithromycin as prescribed     decreased strength

## 2020-01-07 ENCOUNTER — MEDICATION RENEWAL (OUTPATIENT)
Age: 40
End: 2020-01-07

## 2020-01-13 NOTE — LETTER CLOSING
[Vivian Yoon MD, FCCP] : Vivian Yoon MD, FCCP [Director, Pulmonary Hypertension Program] : Director, Pulmonary Hypertension Program [[facility]] : [facility] [Division of Pulmonary, Critical Care and Sleep Medicine] : Division of Pulmonary, Critical Care and Sleep Medicine [[facility]] : [facility] [Associate Professor of Medicine] : Associate Professor of Medicine

## 2020-01-13 NOTE — CONSULT LETTER
[Vivian Yoon MD, FCCP] : Vivian Yoon MD, FCCP [Director, Pulmonary Hypertension Program] : Director, Pulmonary Hypertension Program [[facility]] : [facility] [Division of Pulmonary, Critical Care and Sleep Medicine] : Division of Pulmonary, Critical Care and Sleep Medicine [Associate Professor of Medicine] : Associate Professor of Medicine

## 2020-01-14 ENCOUNTER — APPOINTMENT (OUTPATIENT)
Dept: PULMONOLOGY | Facility: CLINIC | Age: 40
End: 2020-01-14
Payer: MEDICAID

## 2020-01-14 ENCOUNTER — MED ADMIN CHARGE (OUTPATIENT)
Age: 40
End: 2020-01-14

## 2020-01-14 VITALS
DIASTOLIC BLOOD PRESSURE: 86 MMHG | BODY MASS INDEX: 31.08 KG/M2 | RESPIRATION RATE: 15 BRPM | SYSTOLIC BLOOD PRESSURE: 121 MMHG | HEART RATE: 104 BPM | WEIGHT: 198 LBS | OXYGEN SATURATION: 95 % | TEMPERATURE: 97.7 F | HEIGHT: 67 IN

## 2020-01-14 DIAGNOSIS — L50.9 URTICARIA, UNSPECIFIED: ICD-10-CM

## 2020-01-14 PROCEDURE — 99215 OFFICE O/P EST HI 40 MIN: CPT | Mod: 25

## 2020-01-14 PROCEDURE — 36415 COLL VENOUS BLD VENIPUNCTURE: CPT

## 2020-01-14 PROCEDURE — 96372 THER/PROPH/DIAG INJ SC/IM: CPT

## 2020-01-14 RX ORDER — METHYLPREDNISOLONE 40 MG/ML
40 INJECTION, POWDER, LYOPHILIZED, FOR SOLUTION INTRAMUSCULAR; INTRAVENOUS
Qty: 1 | Refills: 0 | Status: COMPLETED | OUTPATIENT
Start: 2020-01-14

## 2020-01-14 RX ADMIN — METHYLPREDNISOLONE SODIUM SUCCINATE 0.5 MG: 40 INJECTION, POWDER, FOR SOLUTION INTRAMUSCULAR; INTRAVENOUS at 00:00

## 2020-01-14 NOTE — PHYSICAL EXAM
[General Appearance - Well Nourished] : well nourished [General Appearance - Well Developed] : well developed [II] : II [Normal Conjunctiva] : the conjunctiva exhibited no abnormalities [Arterial Pulses Normal] : the arterial pulses were normal [Respiration, Rhythm And Depth] : normal respiratory rhythm and effort [Exaggerated Use Of Accessory Muscles For Inspiration] : no accessory muscle use [Bowel Sounds] : normal bowel sounds [Abdomen Soft] : soft [Abnormal Walk] : normal gait [Cyanosis, Localized] : no localized cyanosis [Nail Clubbing] : no clubbing of the fingernails [Petechial Hemorrhages (___cm)] : no petechial hemorrhages [] : no ischemic changes [Skin Color & Pigmentation] : normal skin color and pigmentation [Cranial Nerves] : cranial nerves 2-12 were intact [Normal Oral Mucosa] : normal oral mucosa [Normal Oropharynx] : normal oropharynx [Oriented To Time, Place, And Person] : oriented to person, place, and time [No Anxiety] : not feeling anxious [FreeTextEntry1] : No major pedal edema

## 2020-01-14 NOTE — REVIEW OF SYSTEMS
[Sinus Problems] : sinus problems [Dyspnea] : dyspnea [Palpitations] : palpitations [Headache] : headache [Anxiety] : anxiety [Panic Attacks] : panic attacks [Negative] : Sleep Disorder [Fever] : no fever [Eye Irritation] : no ~T irritation of the eyes [Chills] : no chills [Dry Eyes] : no dryness of the eyes [Hemoptysis] : no hemoptysis [Cough] : no cough [Sputum] : not coughing up ~M sputum [Pleuritic Pain] : no pleuritic pain [Wheezing] : no wheezing [Chest Tightness] : no chest tightness [Hypertension] : no ~T hypertension [Chest Discomfort] : no chest discomfort [Hypotension] : no hypotension [Dysrhythmia] : no dysrhythmia [Orthopnea] : no orthopnea [Hay Fever] : no hay fever [Itchy Eyes] : no itching of ~T the eyes [Edema] : ~T edema was not present [Nausea] : no nausea [Nocturia] : no nocturia [Dysphagia] : no dysphagia [Trauma] : no ~T physical trauma [Frequency] : no change in urinary frequency [Dizziness] : no dizziness [Fracture] : no fracture [Syncope] : no fainting [Diet Meds] : not taking dietary supplements [DVT] : no DVT [Hepatic Disease] : no hepatic disease

## 2020-01-14 NOTE — HISTORY OF PRESENT ILLNESS
[Stable] : are stable [None] : ~He/She~ has no significant interval events [Difficulty Breathing During Exertion] : stable dyspnea on exertion [Oxygen Rate  ___ lpm] : Oxygen rate is [unfilled] lpm [Oxygen] : the patient uses supplemental oxygen [Nightly] : nightly [Group I - Pulmonary Arterial HTN] : Group I - Pulmonary Arterial HTN: [Flu Vaccine] : influenza virus vaccine [FreeTextEntry1] : -----------------Patient is here for follow up of her pulmonary htn. ------------------INITIALLY  REFD  WITH   ECHO [DONE  OUTSIDE ] ELEVATED PASP  DILATED RV AND RA---------  HAS BEEN TOLD IN THE PAST SHE HAS  ASTHMA-----long-standing history of dyspnea on exertion-----------------has baseline tachycardia ----.....No history of fever, chills , rigors, chest pain, or hemoptysis. No h/o significant weight loss in last few months. No history of liver dysfunction , collagen vascular disorder or chronic thromboembolic disease. I would classify her dyspnea as WHO  FUNCTIONAL CLASS III-----------no calf tenderness----------SLEEP STUDY SHOWS AHI 0.6 but T 90  < 35 %-----DIAGNOSED  AS PAH ---------WAS ON ADMAPAS  NOW ON   SQ REMODULIN  [ SINCE MAY 2108]  ---S/P PACEMAKER PLACEMENT AT [place]  AND ON METOPROLOL------\par \par ------cardiac CATH ---- 28/ july / 2014.......PAP -76 , Aortic pressure -84, left ventricle edp 10 MMHG , rt atrium - 13, PAP after NO - 42--- CO 3.19,   PVR 1928.78 ( dsc) 24.12 ( woods) PVR after NO ( 692.51( dsc), 8.66 ( enrique)\par \par CARDIAC  CATH 2108----\par -\par --CTA CHEST---- july 23/2014--- no evidence of PE. non specific ground glass opacities throughout the lungs....moderate pericardial effusion ---\par \par --repeat CT chest WITHOUT CONTRAST  12/2014 MOSAIC PATTERN UNCHANGED  FROM BEFORE\par vq scan 6/2015 abnormal- low prob PE\par cxr-6/2015 no acute disease \par ---------CT CHEST-----1. Enlarged main pulmonary artery compatible with pulmonary \par hypertension. -2. Patchy bilateral groundglass are unchanged since 06/20/2015 and are of \par uncertain origin. ------       \par \par ECHO ....July 24/ 2014----right ventricular enlargement with decrease ventricular systolic function...estimated rt ventricle systolic pre equals 113mmhg, ---\par \par ECHO 2018----  EF80, [id number]\par \par SEPT 2104  6MWT- WALKED 301 METERS-------PSG   2014   LOW  T90 BUT NO ERICA\par \par \par \par  june 2015---SAW DR FREEMAN  AT [place]  -cath showed   elevated  pressures but  pa ANGIOgram   should only distal clts  not a candidate for  surgery for CTEPH---RA 4 mmhg, RV 46/5, PA 49/24 mean PA 33, PCWP 13   WITH SUGGESTION OF VASODILATOR  RESPONSE  [ D/W DR LUONG]------AS PER HEATH FREEMAN  SHE HAS GROUP I PAH  WITH DISTAL CLOTS S RECOMMENDED   MEDICAL TREATMENT---PT ON  PROCARDIA  AND  ADMAPAS---DID NOT TOLERATE OPSUMIT IN PAST [[-[ DUE TO LOW BP] --------- -------------------------------.\par \par ALSO ON ANTICOAGULATION-follows coumadin clinic  ------- elevated INR today DENIES BLEEDING/HEMATURIA/MELENA\par \par She reports noncompliance to adempas since 8/1/15 has  basla tachycardia f/u with DR KRISHNAMURTHY  cardilogy ---\par \par ---PFT AUG 2016------FVC 2.44, FEV 1 1.61,   TLC 4.37, mild to moderate obstructive lung defect\par ----jan 2017 6MWD 250 m---\par ---2019  6MWT    ---165 METERS\par \par --------ECHO AUG 2016-----EF 82%-----PASP 26 mmHg\par --CATH 2014 ----MEAN PA 77, LVEDP 10, CO 3.14\par \par ------CT SCAN AUG 2016-------patchy bilateral groundglass unchanged since 6/20/2015-----enlarged PA 3.5 cm-----\par CT SCAN  2018--NO PE, PA 4.0 CM,MOSAIC ATTENUATION\par \par july 2018 s/p hospitalization for syncopal episode at home. She was transferred to [facility] where  a pacemaker was inserted d/t tachycardia and syncope. She was also subsequently started on remodulin SQ by Dr Chow--current rate 12ng. She is still have dyspnea.\par \par ------OCT 2018--------s/p hospitalization at [facility]------S/P PAEMAKER PLACEMENT AT [place]------\par \par november 2019 here for acute visit- PAH w/remodulin @16ng infusing subcutaneously\par she is c/o chronic  palpitations, slightly improved.\par comes in today for cough and wheezing- started zpack and prednisone 2 days ago\par \par \par jan 2020- PAH- remodulin @16ng- infected previous SQ site - started doxy last week but still draining (yellow drainage). She is also c/o asthma exacerbation- started prednisone 10mg and using nebs

## 2020-01-14 NOTE — DISCUSSION/SUMMARY
[FreeTextEntry1] : ------------Assessment and Plan--------39 year-old lady with PULMONARY ARTERIAL HYPERTENSION  S/P PPM  FOR TACHYCARDIA AT [place] ON S/Q REMODULIN - \par \par 1]   PULM  ART HTN ]   --------- WHO GROUP I FUNCTIONAL CLASS III----[ AS PER  DR FREEMAN  WHO DID PULM ANGIO SHE HAS FEATURES OF GROUP I  WITH SOME EVIDENCE OF DISTAL CLOTS] She is on remodulin   16ng--  WILL INCREASE IT TO 17 NG/KG/MIN  ONCE URTI IS RESOLVED- -NEEDS repeat  ECHO\par 2) ---She is now on xarelto---will be on lifelong anticoagulation---encouraged use of oxygen-\par 3), ---- URI-  complete zpack and prednisone s/p medrol 40mg in office -TAPER PREDNSISONE  SLOWLY\par 4) S/P PPM- follows EP clinic at [facility] ---NEEDS TO F/U WITH EPS SERVICE AS WELL\par 5) chronic palpitations- referred to Dr Garcia- heart failure clinic\par 6) needs yearly CT chest and PFT\par 7) she has features of ERICA- including sleep disturbance, nocturnal desat, and excessive fatigue- will get HST to r/o ERICA\par 8) influenza vac given\par 9) has consult /[facility] Team next month\par 10)  INFECTION OF MERCEDES SITE ON ABDOMINAL WALL---CLINDAMYCIN, DAILY DRESSING \par \par 11]  F/U IN A WEEK\par \par \par \par   Patient at this time  will follow  the above mentioned recommendations --and f/u in DEC 2018----If you have any questions  I can be reached  at #  [phone]. \par \par Vivian Yoon MD, FCCP \par Director, Pulmonary Hypertension Program \par [facility] \par Division of Pulmonary, Critical Care and Sleep Medicine \par  Professor of Medicine \par [employer]\par \par

## 2020-01-15 LAB
25(OH)D3 SERPL-MCNC: 11.3 NG/ML
ALBUMIN SERPL ELPH-MCNC: 4.1 G/DL
ALP BLD-CCNC: 58 U/L
ALT SERPL-CCNC: 8 U/L
ANION GAP SERPL CALC-SCNC: 17 MMOL/L
AST SERPL-CCNC: 12 U/L
BASOPHILS # BLD AUTO: 0.05 K/UL
BASOPHILS NFR BLD AUTO: 0.6 %
BILIRUB SERPL-MCNC: 0.3 MG/DL
BUN SERPL-MCNC: 11 MG/DL
CALCIUM SERPL-MCNC: 9.4 MG/DL
CHLORIDE SERPL-SCNC: 102 MMOL/L
CO2 SERPL-SCNC: 22 MMOL/L
CREAT SERPL-MCNC: 0.97 MG/DL
EOSINOPHIL # BLD AUTO: 0.25 K/UL
EOSINOPHIL NFR BLD AUTO: 2.9 %
ERYTHROCYTE [SEDIMENTATION RATE] IN BLOOD BY WESTERGREN METHOD: 39 MM/HR
FERRITIN SERPL-MCNC: 11 NG/ML
GLUCOSE SERPL-MCNC: 82 MG/DL
HCT VFR BLD CALC: 36.8 %
HGB BLD-MCNC: 10.9 G/DL
IMM GRANULOCYTES NFR BLD AUTO: 0.2 %
IRON SATN MFR SERPL: 4 %
IRON SERPL-MCNC: 18 UG/DL
LYMPHOCYTES # BLD AUTO: 2.13 K/UL
LYMPHOCYTES NFR BLD AUTO: 25.1 %
MAN DIFF?: NORMAL
MCHC RBC-ENTMCNC: 19.8 PG
MCHC RBC-ENTMCNC: 29.6 GM/DL
MCV RBC AUTO: 66.9 FL
MONOCYTES # BLD AUTO: 0.48 K/UL
MONOCYTES NFR BLD AUTO: 5.6 %
NEUTROPHILS # BLD AUTO: 5.57 K/UL
NEUTROPHILS NFR BLD AUTO: 65.6 %
PLATELET # BLD AUTO: 460 K/UL
POTASSIUM SERPL-SCNC: 4.2 MMOL/L
PROT SERPL-MCNC: 6.8 G/DL
RBC # BLD: 5.5 M/UL
RBC # FLD: 18.3 %
SODIUM SERPL-SCNC: 140 MMOL/L
TIBC SERPL-MCNC: 413 UG/DL
UIBC SERPL-MCNC: 395 UG/DL
WBC # FLD AUTO: 8.5 K/UL

## 2020-01-16 NOTE — CONSULT LETTER
[Vivian Yoon MD, FCCP] : Vivian Yoon MD, FCCP [Director, Pulmonary Hypertension Program] : Director, Pulmonary Hypertension Program [[facility]] : [facility] [Division of Pulmonary, Critical Care and Sleep Medicine] : Division of Pulmonary, Critical Care and Sleep Medicine [Associate Professor of Medicine] : Associate Professor of Medicine

## 2020-01-16 NOTE — LETTER CLOSING
[Vivian Yoon MD, FCCP] : Vivian Yoon MD, FCCP [Director, Pulmonary Hypertension Program] : Director, Pulmonary Hypertension Program [[facility]] : [facility] [Associate Professor of Medicine] : Associate Professor of Medicine [Division of Pulmonary, Critical Care and Sleep Medicine] : Division of Pulmonary, Critical Care and Sleep Medicine [[facility]] : [facility]

## 2020-01-17 ENCOUNTER — APPOINTMENT (OUTPATIENT)
Dept: PULMONOLOGY | Facility: CLINIC | Age: 40
End: 2020-01-17
Payer: MEDICAID

## 2020-01-17 VITALS
BODY MASS INDEX: 31.08 KG/M2 | DIASTOLIC BLOOD PRESSURE: 83 MMHG | TEMPERATURE: 97.5 F | SYSTOLIC BLOOD PRESSURE: 142 MMHG | HEIGHT: 67 IN | WEIGHT: 198 LBS | HEART RATE: 100 BPM | RESPIRATION RATE: 15 BRPM | OXYGEN SATURATION: 92 %

## 2020-01-17 DIAGNOSIS — L08.9 LOCAL INFECTION OF THE SKIN AND SUBCUTANEOUS TISSUE, UNSPECIFIED: ICD-10-CM

## 2020-01-17 LAB — BACTERIA SPEC CULT: ABNORMAL

## 2020-01-17 PROCEDURE — 99215 OFFICE O/P EST HI 40 MIN: CPT

## 2020-01-17 NOTE — DISCUSSION/SUMMARY
[FreeTextEntry1] : ------------Assessment and Plan--------39 year-old lady with PULMONARY ARTERIAL HYPERTENSION  S/P PPM  FOR TACHYCARDIA AT [place] ON S/Q REMODULIN - \par \par 1]   PULM  ART HTN ]   --------- WHO GROUP I FUNCTIONAL CLASS III----[ AS PER  DR FREEMAN  WHO DID PULM ANGIO SHE HAS FEATURES OF GROUP I  WITH SOME EVIDENCE OF DISTAL CLOTS] She is on remodulin   16ng--  WILL INCREASE IT TO 17 NG/KG/MIN  ONCE tehs ite infection  IS RESOLVED- -NEEDS repeat  ECHO\par 2) ---She is now on xarelto---will be on lifelong anticoagulation---encouraged use of oxygen-\par 3), -asthma -TAPER PREDNSISONE  SLOWLY, use nebs\par 4) S/P PPM- follows EP clinic at [place] ---NEEDS TO F/U WITH EPS SERVICE AS WELL\par 5) chronic palpitations- referred to Dr Garcia- heart failure clinic\par 6) needs yearly CT chest and PFT\par 7) she has features of ERICA- including sleep disturbance, nocturnal desat, and excessive fatigue- will get HST to r/o ERICA\par \par 8) has consult /[facility] Team next month\par 9)  INFECTION OF  SITE ON ABDOMINAL WALL- ---the   abdominal area   cleaned  and dressed again [ Car] improved- stop --CLINDAMYCIN after total of 5 days of treatment, c/s-final reported today as MRSA susceptible to clindanycin\par \par 10/ f/u in one month\par \par \par \par   Patient at this time  will follow  the above mentioned recommendations --and f/u in DEC 2018----If you have any questions  I can be reached  at #  [phone]. \par \par Vivian Yoon MD, FCCP \par Director, Pulmonary Hypertension Program \par [facility] \par Division of Pulmonary, Critical Care and Sleep Medicine \par  Professor of Medicine \par [employer]\par \par

## 2020-01-17 NOTE — HISTORY OF PRESENT ILLNESS
[Stable] : are stable [None] : ~He/She~ has no significant interval events [Difficulty Breathing During Exertion] : stable dyspnea on exertion [Oxygen] : the patient uses supplemental oxygen [Oxygen Rate  ___ lpm] : Oxygen rate is [unfilled] lpm [Nightly] : nightly [Group I - Pulmonary Arterial HTN] : Group I - Pulmonary Arterial HTN: [Flu Vaccine] : influenza virus vaccine [FreeTextEntry1] : -----------------Patient is here for follow up of her pulmonary htn. ------------------INITIALLY  REFD  WITH   ECHO [DONE  OUTSIDE ] ELEVATED PASP  DILATED RV AND RA---------  HAS BEEN TOLD IN THE PAST SHE HAS  ASTHMA-----long-standing history of dyspnea on exertion-----------------has baseline tachycardia ----.....No history of fever, chills , rigors, chest pain, or hemoptysis. No h/o significant weight loss in last few months. No history of liver dysfunction , collagen vascular disorder or chronic thromboembolic disease. I would classify her dyspnea as WHO  FUNCTIONAL CLASS III-----------no calf tenderness----------SLEEP STUDY SHOWS AHI 0.6 but T 90  < 35 %-----DIAGNOSED  AS PAH ---------WAS ON ADMAPAS  NOW ON   SQ REMODULIN  [ SINCE MAY 2108]  ---S/P PACEMAKER PLACEMENT AT [place]  AND ON METOPROLOL------\par \par ------cardiac CATH ---- 28/ july / 2014.......PAP -76 , Aortic pressure -84, left ventricle edp 10 MMHG , rt atrium - 13, PAP after NO - 42--- CO 3.19,   PVR 1928.78 ( dsc) 24.12 ( woods) PVR after NO ( 692.51( dsc), 8.66 ( enrique)\par \par CARDIAC  CATH 2108----\par -\par --CTA CHEST---- july 23/2014--- no evidence of PE. non specific ground glass opacities throughout the lungs....moderate pericardial effusion ---\par \par --repeat CT chest WITHOUT CONTRAST  12/2014 MOSAIC PATTERN UNCHANGED  FROM BEFORE\par vq scan 6/2015 abnormal- low prob PE\par cxr-6/2015 no acute disease \par ---------CT CHEST-----1. Enlarged main pulmonary artery compatible with pulmonary \par hypertension. -2. Patchy bilateral groundglass are unchanged since 06/20/2015 and are of \par uncertain origin. ------       \par \par ECHO ....July 24/ 2014----right ventricular enlargement with decrease ventricular systolic function...estimated rt ventricle systolic pre equals 113mmhg, ---\par \par ECHO 2018----  EF80, [id number]\par \par SEPT 2104  6MWT- WALKED 301 METERS-------PSG   2014   LOW  T90 BUT NO ERICA\par \par \par \par  june 2015---SAW DR FREEMAN  AT [place]  -cath showed   elevated  pressures but  pa ANGIOgram   should only distal clts  not a candidate for  surgery for CTEPH---RA 4 mmhg, RV 46/5, PA 49/24 mean PA 33, PCWP 13   WITH SUGGESTION OF VASODILATOR  RESPONSE  [ D/W DR LUONG]------AS PER HEATH FREEMAN  SHE HAS GROUP I PAH  WITH DISTAL CLOTS S RECOMMENDED   MEDICAL TREATMENT---PT ON  PROCARDIA  AND  ADMAPAS---DID NOT TOLERATE OPSUMIT IN PAST [[-[ DUE TO LOW BP] --------- -------------------------------.\par \par ALSO ON ANTICOAGULATION-follows coumadin clinic  ------- elevated INR today DENIES BLEEDING/HEMATURIA/MELENA\par \par She reports noncompliance to adempas since 8/1/15 has  basla tachycardia f/u with DR KRISHNAMURTHY  cardilogy ---\par \par ---PFT AUG 2016------FVC 2.44, FEV 1 1.61,   TLC 4.37, mild to moderate obstructive lung defect\par ----jan 2017 6MWD 250 m---\par ---2019  6MWT    ---165 METERS\par \par --------ECHO AUG 2016-----EF 82%-----PASP 26 mmHg\par --CATH 2014 ----MEAN PA 77, LVEDP 10, CO 3.14\par \par ------CT SCAN AUG 2016-------patchy bilateral groundglass unchanged since 6/20/2015-----enlarged PA 3.5 cm-----\par CT SCAN  2018--NO PE, PA 4.0 CM,MOSAIC ATTENUATION\par \par july 2018 s/p hospitalization for syncopal episode at home. She was transferred to [facility] where  a pacemaker was inserted d/t tachycardia and syncope. She was also subsequently started on remodulin SQ by Dr Chow--current rate 12ng. She is still have dyspnea.\par \par ------OCT 2018--------s/p hospitalization at [facility]------S/P PAEMAKER PLACEMENT AT [place]------\par \par november 2019 here for acute visit- PAH w/remodulin @16ng infusing subcutaneously\par she is c/o chronic  palpitations, slightly improved.\par comes in today for cough and wheezing- started zpack and prednisone 2 days ago\par \par \par jan 17 2020- PAH- remodulin @16ng- SQ site - has stopped draining, less painful - completed doxy last week and is now on clindamycin ---\par She is also c/o asthma exacerbation- started prednisone 10mg and using nebs- symptoms somewhat improved from a few days ago

## 2020-01-17 NOTE — PHYSICAL EXAM
[General Appearance - Well Developed] : well developed [General Appearance - Well Nourished] : well nourished [Normal Conjunctiva] : the conjunctiva exhibited no abnormalities [II] : II [Arterial Pulses Normal] : the arterial pulses were normal [Respiration, Rhythm And Depth] : normal respiratory rhythm and effort [Exaggerated Use Of Accessory Muscles For Inspiration] : no accessory muscle use [Bowel Sounds] : normal bowel sounds [Abdomen Soft] : soft [Abnormal Walk] : normal gait [Nail Clubbing] : no clubbing of the fingernails [Cyanosis, Localized] : no localized cyanosis [Petechial Hemorrhages (___cm)] : no petechial hemorrhages [] : no ischemic changes [Skin Color & Pigmentation] : normal skin color and pigmentation [Cranial Nerves] : cranial nerves 2-12 were intact [Normal Oral Mucosa] : normal oral mucosa [Normal Oropharynx] : normal oropharynx [Oriented To Time, Place, And Person] : oriented to person, place, and time [No Anxiety] : not feeling anxious [Auscultation Breath Sounds / Voice Sounds] : lungs were clear to auscultation bilaterally [FreeTextEntry1] : remodulin site C/D/I

## 2020-01-17 NOTE — REVIEW OF SYSTEMS
[Sinus Problems] : sinus problems [Dyspnea] : dyspnea [Palpitations] : palpitations [Headache] : headache [Anxiety] : anxiety [Panic Attacks] : panic attacks [Negative] : Sleep Disorder [Fever] : no fever [Dry Eyes] : no dryness of the eyes [Chills] : no chills [Eye Irritation] : no ~T irritation of the eyes [Sputum] : not coughing up ~M sputum [Cough] : no cough [Hemoptysis] : no hemoptysis [Pleuritic Pain] : no pleuritic pain [Chest Tightness] : no chest tightness [Hypertension] : no ~T hypertension [Hypotension] : no hypotension [Wheezing] : no wheezing [Orthopnea] : no orthopnea [Chest Discomfort] : no chest discomfort [Dysrhythmia] : no dysrhythmia [Edema] : ~T edema was not present [Dysphagia] : no dysphagia [Hay Fever] : no hay fever [Itchy Eyes] : no itching of ~T the eyes [Nausea] : no nausea [Nocturia] : no nocturia [Frequency] : no change in urinary frequency [Syncope] : no fainting [Fracture] : no fracture [Dizziness] : no dizziness [Trauma] : no ~T physical trauma [DVT] : no DVT [Diet Meds] : not taking dietary supplements [Hepatic Disease] : no hepatic disease

## 2020-01-22 DIAGNOSIS — A49.02 METHICILLIN RESISTANT STAPHYLOCOCCUS AUREUS INFECTION, UNSPECIFIED SITE: ICD-10-CM

## 2020-01-31 ENCOUNTER — APPOINTMENT (OUTPATIENT)
Dept: HEART FAILURE | Facility: CLINIC | Age: 40
End: 2020-01-31
Payer: MEDICAID

## 2020-01-31 VITALS
OXYGEN SATURATION: 96 % | DIASTOLIC BLOOD PRESSURE: 79 MMHG | WEIGHT: 199 LBS | HEIGHT: 67 IN | BODY MASS INDEX: 31.23 KG/M2 | HEART RATE: 104 BPM | SYSTOLIC BLOOD PRESSURE: 110 MMHG

## 2020-01-31 PROCEDURE — 99214 OFFICE O/P EST MOD 30 MIN: CPT

## 2020-01-31 RX ORDER — PREDNISONE 10 MG/1
10 TABLET ORAL DAILY
Qty: 14 | Refills: 0 | Status: DISCONTINUED | COMMUNITY
Start: 2019-11-12 | End: 2020-01-31

## 2020-01-31 RX ORDER — AZITHROMYCIN 250 MG/1
250 TABLET, FILM COATED ORAL
Qty: 1 | Refills: 0 | Status: DISCONTINUED | COMMUNITY
Start: 2019-11-12 | End: 2020-01-31

## 2020-01-31 RX ORDER — RIVAROXABAN 20 MG/1
20 TABLET, FILM COATED ORAL
Qty: 30 | Refills: 2 | Status: DISCONTINUED | COMMUNITY
Start: 2019-01-06 | End: 2020-01-31

## 2020-01-31 NOTE — PHYSICAL EXAM
[General Appearance - Well Nourished] : well nourished [General Appearance - Well Developed] : well developed [Normal Conjunctiva] : the conjunctiva exhibited no abnormalities [Normal Oral Mucosa] : normal oral mucosa [Auscultation Breath Sounds / Voice Sounds] : lungs were clear to auscultation bilaterally [Respiration, Rhythm And Depth] : normal respiratory rhythm and effort [Heart Rate And Rhythm] : heart rate and rhythm were normal [Heart Sounds] : normal S1 and S2 [Edema] : no peripheral edema present [Arterial Pulses Normal] : the arterial pulses were normal [Bowel Sounds] : normal bowel sounds [Abdomen Soft] : soft [Abnormal Walk] : normal gait [Skin Color & Pigmentation] : normal skin color and pigmentation [Nail Clubbing] : no clubbing of the fingernails [Cyanosis, Localized] : no localized cyanosis [] : no rash [Skin Turgor] : normal skin turgor [Oriented To Time, Place, And Person] : oriented to person, place, and time [Impaired Insight] : insight and judgment were intact [No Anxiety] : not feeling anxious [FreeTextEntry1] : Normal JVP without HJR.

## 2020-01-31 NOTE — HISTORY OF PRESENT ILLNESS
[FreeTextEntry1] : Ms. Liz Conway is a 39 year old woman who developed shortness of breath approximately 6 years ago. Around five years ago she was diagnosed with pulmonary hypertension. She was found to have some evidence of distal pulmonary emboli and had previously been on therapy with riociquat. She had a prolonged hospitalization in May of 2018, after she developed syncope. She was found to have tachycardia of unclear etiology with associated hypotension requiring vasopressors. She was ultimately transferred to [place] for transplant evaluation. There she was started on IV treprostinil. Uptitrations of the treprostinil have been modest and she is currently on 16 ng/kg/min. Her primary complaint with the treprostinil has been jaw pain, but reasonably controlled.  More recently, she developed an infusion site infection and was treated with antibiotics. \par \par Since her last clinic visit with me, she has been stable. She is fatigued, which she relates to blood loss related to menstruation. She continues to have exertional tachycardia, , and associated with shortness of breath. She has had multiple ED visits due to tachycardia. She says that her heart rate can increase to 200 bpm, but will slowly decrease. She has not recently suffered from syncope.

## 2020-01-31 NOTE — DISCUSSION/SUMMARY
[FreeTextEntry1] : - I would continue to increase the IV treprostinil barring development of side effects. She is still on a relatively low dose of therapy. It is reassuring that her symptoms have appeared to improve with therapy up to this point. If she develops worsening tachycardia or development of adverse symptoms related to uptitration, I would add either an ERA or a PDE5I. \par - I will discuss my suggestions with Dr. Yoon.\par - I have encouraged her to take iron to address her iron deficiency\par - Follow-up with me in three months.

## 2020-01-31 NOTE — REASON FOR VISIT
[Cardiomyopathy] : cardiomyopathy [Heart Failure] : congestive heart failure [Follow-Up - Clinic] : a clinic follow-up of

## 2020-02-18 ENCOUNTER — APPOINTMENT (OUTPATIENT)
Dept: PULMONOLOGY | Facility: CLINIC | Age: 40
End: 2020-02-18
Payer: MEDICAID

## 2020-02-25 ENCOUNTER — LABORATORY RESULT (OUTPATIENT)
Age: 40
End: 2020-02-25

## 2020-02-25 ENCOUNTER — APPOINTMENT (OUTPATIENT)
Dept: PULMONOLOGY | Facility: CLINIC | Age: 40
End: 2020-02-25
Payer: MEDICAID

## 2020-02-25 ENCOUNTER — RX RENEWAL (OUTPATIENT)
Age: 40
End: 2020-02-25

## 2020-02-25 VITALS
BODY MASS INDEX: 31.08 KG/M2 | OXYGEN SATURATION: 94 % | TEMPERATURE: 97.9 F | RESPIRATION RATE: 15 BRPM | DIASTOLIC BLOOD PRESSURE: 85 MMHG | HEART RATE: 96 BPM | WEIGHT: 198 LBS | HEIGHT: 67 IN | SYSTOLIC BLOOD PRESSURE: 119 MMHG

## 2020-02-25 PROCEDURE — 36415 COLL VENOUS BLD VENIPUNCTURE: CPT

## 2020-02-25 PROCEDURE — 99215 OFFICE O/P EST HI 40 MIN: CPT | Mod: 25

## 2020-02-26 LAB
ALBUMIN SERPL ELPH-MCNC: 4.1 G/DL
ALP BLD-CCNC: 55 U/L
ALT SERPL-CCNC: 11 U/L
ANION GAP SERPL CALC-SCNC: 12 MMOL/L
AST SERPL-CCNC: 11 U/L
BASOPHILS # BLD AUTO: 0.07 K/UL
BASOPHILS NFR BLD AUTO: 0.9 %
BILIRUB SERPL-MCNC: 0.4 MG/DL
BUN SERPL-MCNC: 7 MG/DL
CALCIUM SERPL-MCNC: 8.9 MG/DL
CHLORIDE SERPL-SCNC: 106 MMOL/L
CO2 SERPL-SCNC: 22 MMOL/L
CREAT SERPL-MCNC: 0.96 MG/DL
EOSINOPHIL # BLD AUTO: 0.34 K/UL
EOSINOPHIL NFR BLD AUTO: 4.5 %
FERRITIN SERPL-MCNC: 8 NG/ML
GLUCOSE SERPL-MCNC: 86 MG/DL
HCT VFR BLD CALC: 39.7 %
HGB BLD-MCNC: 11.4 G/DL
IMM GRANULOCYTES NFR BLD AUTO: 0.3 %
LYMPHOCYTES # BLD AUTO: 2.22 K/UL
LYMPHOCYTES NFR BLD AUTO: 29.5 %
MAN DIFF?: NORMAL
MCHC RBC-ENTMCNC: 19.7 PG
MCHC RBC-ENTMCNC: 28.7 GM/DL
MCV RBC AUTO: 68.7 FL
MONOCYTES # BLD AUTO: 0.62 K/UL
MONOCYTES NFR BLD AUTO: 8.2 %
NEUTROPHILS # BLD AUTO: 4.25 K/UL
NEUTROPHILS NFR BLD AUTO: 56.6 %
PLATELET # BLD AUTO: 375 K/UL
POTASSIUM SERPL-SCNC: 4.2 MMOL/L
PROT SERPL-MCNC: 7 G/DL
RBC # BLD: 5.78 M/UL
RBC # FLD: 18.9 %
SODIUM SERPL-SCNC: 140 MMOL/L
WBC # FLD AUTO: 7.52 K/UL

## 2020-02-26 NOTE — HISTORY OF PRESENT ILLNESS
[FreeTextEntry1] : -----------------Patient is here for follow up of her pulmonary htn. ------------------INITIALLY  REFD  WITH   ECHO [DONE  OUTSIDE ] ELEVATED PASP  DILATED RV AND RA---------  HAS BEEN TOLD IN THE PAST SHE HAS  ASTHMA-----long-standing history of dyspnea on exertion-----------------has baseline tachycardia ----.....No history of fever, chills , rigors, chest pain, or hemoptysis. No h/o significant weight loss in last few months. No history of liver dysfunction , collagen vascular disorder or chronic thromboembolic disease. I would classify her dyspnea as WHO  FUNCTIONAL CLASS III-----------no calf tenderness----------SLEEP STUDY SHOWS AHI 0.6 but T 90  < 35 %-----DIAGNOSED  AS PAH ---------WAS ON ADMAPAS  NOW ON   SQ REMODULIN  [ SINCE MAY 2108]  ---S/P PACEMAKER PLACEMENT AT [place]  AND ON METOPROLOL------\par \par ------cardiac CATH ---- 28/ july / 2014.......PAP -76 , Aortic pressure -84, left ventricle edp 10 MMHG , rt atrium - 13, PAP after NO - 42--- CO 3.19,   PVR 1928.78 ( dsc) 24.12 ( woods) PVR after NO ( 692.51( dsc), 8.66 ( enrique)\par \par CARDIAC  CATH 2108----\par -\par --CTA CHEST---- july 23/2014--- no evidence of PE. non specific ground glass opacities throughout the lungs....moderate pericardial effusion ---\par \par --repeat CT chest WITHOUT CONTRAST  12/2014 MOSAIC PATTERN UNCHANGED  FROM BEFORE\par vq scan 6/2015 abnormal- low prob PE\par cxr-6/2015 no acute disease \par ---------CT CHEST-----1. Enlarged main pulmonary artery compatible with pulmonary \par hypertension. -2. Patchy bilateral groundglass are unchanged since 06/20/2015 and are of \par uncertain origin. ------       \par \par ECHO ....July 24/ 2014----right ventricular enlargement with decrease ventricular systolic function...estimated rt ventricle systolic pre equals 113mmhg, ---\par \par ECHO 2018----  EF80, [id number]\par \par SEPT 2104  6MWT- WALKED 301 METERS-------PSG   2014   LOW  T90 BUT NO ERICA\par \par \par \par  june 2015---SAW DR FREEMAN  AT [place]  -cath showed   elevated  pressures but  pa ANGIOgram   should only distal clts  not a candidate for  surgery for CTEPH---RA 4 mmhg, RV 46/5, PA 49/24 mean PA 33, PCWP 13   WITH SUGGESTION OF VASODILATOR  RESPONSE  [ D/W DR LUONG]------AS PER HEATH FREEMAN  SHE HAS GROUP I PAH  WITH DISTAL CLOTS S RECOMMENDED   MEDICAL TREATMENT---PT ON  PROCARDIA  AND  ADMAPAS---DID NOT TOLERATE OPSUMIT IN PAST [[-[ DUE TO LOW BP] --------- -------------------------------.\par \par ALSO ON ANTICOAGULATION-follows coumadin clinic  ------- elevated INR today DENIES BLEEDING/HEMATURIA/MELENA\par \par She reports noncompliance to adempas since 8/1/15 has  basla tachycardia f/u with DR KRISHNAMURTHY  cardilogy ---\par \par ---PFT AUG 2016------FVC 2.44, FEV 1 1.61,   TLC 4.37, mild to moderate obstructive lung defect\par ----jan 2017 6MWD 250 m---\par ---2019  6MWT    ---165 METERS\par \par --------ECHO AUG 2016-----EF 82%-----PASP 26 mmHg\par --CATH 2014 ----MEAN PA 77, LVEDP 10, CO 3.14\par \par ------CT SCAN AUG 2016-------patchy bilateral groundglass unchanged since 6/20/2015-----enlarged PA 3.5 cm-----\par CT SCAN  2018--NO PE, PA 4.0 CM,MOSAIC ATTENUATION\par \par july 2018 s/p hospitalization for syncopal episode at home. She was transferred to [facility] where  a pacemaker was inserted d/t tachycardia and syncope. She was also subsequently started on remodulin SQ by Dr Chow--current rate 12ng. She is still have dyspnea.\par \par ------OCT 2018--------s/p hospitalization at [facility]------S/P PAEMAKER PLACEMENT AT [place]------\par \par november 2019 here for acute visit- PAH w/remodulin @16ng infusing subcutaneously\par she is c/o chronic  palpitations, slightly improved.\par comes in today for cough and wheezing- started zpack and prednisone 2 days ago\par \par \par jan 17 2020- PAH- remodulin @16ng- SQ site - has stopped draining, less painful - completed doxy last week and is now on clindamycin ---\par She is also c/o asthma exacerbation- started prednisone 10mg and using nebs- symptoms somewhat improved from a few days ago\par \par Feb 25, 2020  PAH on Xarelto and Remodulin still at 16ng SQ site. New site looks good without infection. Old site has completely healed. Patient still having intermittent jaw claudication. She has more tachycardia with periods. She has Iron deficiency anemia and has stopped taking her Iron due to constipation. she otherwise has good exercise tolerance. Has oxygen at home and is using it.

## 2020-02-26 NOTE — DISCUSSION/SUMMARY
[FreeTextEntry1] : ------------Assessment and Plan--------39 year-old lady with PULMONARY ARTERIAL HYPERTENSION  S/P PPM  FOR TACHYCARDIA AT [place] ON S/Q REMODULIN - \par \par 1]   PULM  ART HTN ]   --------- WHO GROUP I FUNCTIONAL CLASS II----[ AS PER  DR FREEMAN  WHO DID PULM ANGIO SHE HAS FEATURES OF GROUP I  WITH SOME EVIDENCE OF DISTAL CLOTS] She is on remodulin   16ng--  WILL INCREASE IT TO 17 NG/KG/MIN  ONCE anemia better controlled. Site infection has resolved.  -NEEDS repeat  ECHO. Use of oxygen medically necessary given severe Pulm Htn, anemia and h/o nocturnal desaturation which could worsen Pulm HTN.\par 2) ---She is now on xarelto---will be on lifelong anticoagulation---.\par 3), -asthma -TAPER PREDNSISONE  SLOWLY, use nebs\par 4) S/P PPM- follows EP clinic at [place] ---NEEDS TO F/U WITH EPS SERVICE AS WELL\par 5) chronic palpitations- seeing Dr Garcia- heart failure clinic\par 6) needs yearly CT chest and PFT\par 7) she has features of ERICA- including sleep disturbance, nocturnal desat, and excessive fatigue-  HST was negative for ERICA in 2014 but had desaturation. Will try to repeat HST.\par 8) has consult /[place] Lung Transplant Team next month\par 9) Anemia: Will repeat Iron studies. Patient was advised to follow up with Hematology for possible Iron infusion and with GYN to look for Copper or Progesterone contraceptive to decrease frequency of periods and improve anemia.\par \par \par 10/ f/u in one month\par \par \par \par   Patient at this time  will follow  the above mentioned recommendations --and f/u in DEC 2018----If you have any questions  I can be reached  at #  [phone]. \par \par Vivian Yoon MD, FCCP \par Director, Pulmonary Hypertension Program \par [facility] \par Division of Pulmonary, Critical Care and Sleep Medicine \par  Professor of Medicine \par [employer] [facility]\par \par

## 2020-02-26 NOTE — REVIEW OF SYSTEMS
[Fever] : no fever [Chills] : no chills [Dry Eyes] : no dryness of the eyes [Eye Irritation] : no ~T irritation of the eyes [Cough] : no cough [Sputum] : not coughing up ~M sputum [Hemoptysis] : no hemoptysis [Chest Tightness] : no chest tightness [Pleuritic Pain] : no pleuritic pain [Wheezing] : no wheezing [Hypertension] : no ~T hypertension [Hypotension] : no hypotension [Chest Discomfort] : no chest discomfort [Orthopnea] : no orthopnea [Dysrhythmia] : no dysrhythmia [Edema] : ~T edema was not present [Hay Fever] : no hay fever [Itchy Eyes] : no itching of ~T the eyes [Dysphagia] : no dysphagia [Nausea] : no nausea [Nocturia] : no nocturia [Frequency] : no change in urinary frequency [Trauma] : no ~T physical trauma [Fracture] : no fracture [Dizziness] : no dizziness [Syncope] : no fainting [DVT] : no DVT [Diet Meds] : not taking dietary supplements [Hepatic Disease] : no hepatic disease

## 2020-03-30 ENCOUNTER — APPOINTMENT (OUTPATIENT)
Dept: OBGYN | Facility: CLINIC | Age: 40
End: 2020-03-30

## 2020-03-31 ENCOUNTER — APPOINTMENT (OUTPATIENT)
Dept: PULMONOLOGY | Facility: CLINIC | Age: 40
End: 2020-03-31
Payer: MEDICAID

## 2020-03-31 PROCEDURE — 99443: CPT

## 2020-03-31 RX ORDER — PREDNISONE 10 MG/1
10 TABLET ORAL DAILY
Qty: 30 | Refills: 1 | Status: DISCONTINUED | COMMUNITY
Start: 2020-01-07 | End: 2020-03-31

## 2020-04-20 ENCOUNTER — APPOINTMENT (OUTPATIENT)
Dept: HEART FAILURE | Facility: CLINIC | Age: 40
End: 2020-04-20

## 2020-04-23 ENCOUNTER — NON-APPOINTMENT (OUTPATIENT)
Age: 40
End: 2020-04-23

## 2020-04-27 ENCOUNTER — APPOINTMENT (OUTPATIENT)
Dept: CARDIOLOGY | Facility: CLINIC | Age: 40
End: 2020-04-27
Payer: MEDICAID

## 2020-04-27 PROCEDURE — 99442: CPT

## 2020-05-04 ENCOUNTER — OUTPATIENT (OUTPATIENT)
Dept: OUTPATIENT SERVICES | Facility: HOSPITAL | Age: 40
LOS: 1 days | Discharge: HOME | End: 2020-05-04

## 2020-05-04 DIAGNOSIS — D64.9 ANEMIA, UNSPECIFIED: ICD-10-CM

## 2020-05-04 DIAGNOSIS — Z98.51 TUBAL LIGATION STATUS: Chronic | ICD-10-CM

## 2020-05-07 ENCOUNTER — APPOINTMENT (OUTPATIENT)
Dept: HEMATOLOGY ONCOLOGY | Facility: CLINIC | Age: 40
End: 2020-05-07
Payer: MEDICAID

## 2020-05-07 PROCEDURE — 99205 OFFICE O/P NEW HI 60 MIN: CPT | Mod: 95

## 2020-05-08 NOTE — CONSULT LETTER
[Please see my note below.] : Please see my note below. [Consult Letter:] : I had the pleasure of evaluating your patient, [unfilled]. [Dear  ___] : Dear  [unfilled], [Sincerely,] : Sincerely, [Consult Closing:] : Thank you very much for allowing me to participate in the care of this patient.  If you have any questions, please do not hesitate to contact me. [DrJames  ___] : Dr. BOOTH [FreeTextEntry3] : Vanessa Jeong MD. MS. \par Hematologist/Oncologist\par [facility]\par ph. [phone]\par fx. [phone]\par

## 2020-05-08 NOTE — RESULTS/DATA
[FreeTextEntry1] : \par  Ferritin, Serum             Final\par \par No Documents Attached\par \par \par   Test   Result   Flag Reference Goal \par  Ferritin 8 ng/mL L   \par \par  Ordered by: YONY MAYEN       Collected/Examined: [id number] 03:30PM       \par Verified by: YONY MAYEN [id number] 09:40AM       \par  Result Communication: No patient communication needed at this time;\par Stage: Final       \par  Performed at: [facility] (Med Director: Javier Corley M.D)       Resulted: [id number] 12:32AM       Last Updated: [id number] 09:40AM       Accession: [id number]       \par

## 2020-05-08 NOTE — OB HISTORY
[Regular Cycle Intervals] : periods have been regular [Approximately ___ (Month)] : the LMP was approximately [unfilled] month(s) ago

## 2020-05-08 NOTE — HISTORY OF PRESENT ILLNESS
[Home] : at home, [unfilled] , at the time of the visit. [Patient] : the patient [Other Location: e.g. Home (Enter Location, City,State)___] : at [unfilled] [Self] : self [Disease:__________________________] : Disease: [unfilled] [Treatment Protocol] : Treatment Protocol [de-identified] : mild [FreeTextEntry1] : iron tabs po BID [de-identified] : 38 yo female w/ a hx of iron deficiency anemia, etiology inclear. Pt reuired IV ion infusions in the past, currently on oral iron tabs po BID, but not taking it consistently due to constipation. Pt last had labs done in Feb 2020 which showed adequate Hb 11.4g, ferritin was still low at 8. Pt denies any significant blood loss from menses. She has never had egd or colonoscopy. She reports famhx of iron def anemia in mother and dtr. Pt was last hospitalized 1 week ago for palpitations but this was cardiac induced, no significant anemia noted during that hospitalization - [facility]. Currently pt is home in usual state of health w/o any acute complaints.

## 2020-05-12 ENCOUNTER — APPOINTMENT (OUTPATIENT)
Dept: PULMONOLOGY | Facility: CLINIC | Age: 40
End: 2020-05-12
Payer: MEDICAID

## 2020-05-12 PROCEDURE — 99205 OFFICE O/P NEW HI 60 MIN: CPT | Mod: 95

## 2020-05-12 NOTE — HISTORY OF PRESENT ILLNESS
[Home] : at home, [unfilled] , at the time of the visit. [Medical Office: ([facility])___] : at the medical office located in  [Self] : self [Patient] : the patient [TextBox_4] : [facility] telehealth visit conducted\par 39 year-old lady with PULMONARY ARTERIAL HYPERTENSION S/P PPM FOR TACHYCARDIA AT [place] ON S/Q REMODULIN - \par \par recently admitted to [facility] for infected remodulin site  ON 17 NG/KG/MIN\par ON RIVARAOXABAN AS WELL\par \par   SHE IS ALSO BEING FOLLOWED AT [facility] FOR LUNG TRANSPLANT EVALUATION\par \par MAY 2020--RECENT ADMISSION TO [facility] FOR TACHYCARDIA AND  ABDOMEN  SITE INFECTION\par \par AFEBRILE ON DOXYCYCLINE FOR ABDOMEN SITE INFECTION---\par  BREATHING BACK TO BASELINE

## 2020-05-12 NOTE — DISCUSSION/SUMMARY
[FreeTextEntry1] : -Assessment and Plan--------39 year-old lady with PULMONARY ARTERIAL HYPERTENSION S/P PPM FOR TACHYCARDIA AT [place] ON S/Q REMODULIN - \par \par 1] PULM ART HTN ] --------- WHO GROUP I FUNCTIONAL CLASS III----[ AS PER DR FREEMAN WHO DID PULM ANGIO SHE HAS FEATURES OF GROUP I WITH SOME EVIDENCE OF DISTAL CLOTS] She is on remodulin 17NG/KG/MIN -----NEEDS repeat ECHO\par 2) ---She is now on xarelto---will be on lifelong anticoagulation-  BUT  DECREASED THE DOSE  FOR NOW AS SHE CONTINUES TO HAVE ANEMIA--encouraged use of oxygen-\par 3), labs today- ? anemia - F/U ONCOLOGY\par 4) S/P PPM- follows EP clinic at [place] ---NEEDS TO F/U WITH EPS SERVICE AS WELL\par 5) chronic palpitations- referred to Dr Garcia- heart failure clinic\par 6) needs yearly CT chest and PFT\par 7) she has features of ERICA- including sleep disturbance, nocturnal desat, and excessive fatigue- will get HST to r/o ERICA\par 8) influenza vac given\par 9) has consult /[place] Lung Transplant Team \par 10) ON DOXYCYCLINE FOR SITE INFECTRION\par 11]f/u in 1months\par \par  Patient at this time will follow the above mentioned recommendations ---If you have any questions I can be reached at # [phone]. \par \par Vivian Yoon MD, FCCP \par Director, Pulmonary Hypertension Program \par [facility] \par Division of Pulmonary, Critical Care and Sleep Medicine \par  Professor of Medicine \par [employer]\par  sob with wheezing

## 2020-05-15 LAB
ALBUMIN SERPL ELPH-MCNC: 4.2 G/DL
ALP BLD-CCNC: 62 U/L
ALT SERPL-CCNC: 12 U/L
ANION GAP SERPL CALC-SCNC: 24 MMOL/L
AST SERPL-CCNC: 33 U/L
BASOPHILS # BLD AUTO: 0.07 K/UL
BASOPHILS NFR BLD AUTO: 0.9 %
BILIRUB SERPL-MCNC: 0.2 MG/DL
BUN SERPL-MCNC: 11 MG/DL
CALCIUM SERPL-MCNC: 9.1 MG/DL
CHLORIDE SERPL-SCNC: 101 MMOL/L
CO2 SERPL-SCNC: 12 MMOL/L
CREAT SERPL-MCNC: 0.93 MG/DL
EOSINOPHIL # BLD AUTO: 0.36 K/UL
EOSINOPHIL NFR BLD AUTO: 4.8 %
GLUCOSE SERPL-MCNC: NORMAL MG/DL
HCT VFR BLD CALC: 41.7 %
HGB BLD-MCNC: 11.2 G/DL
IMM GRANULOCYTES NFR BLD AUTO: 0.4 %
LYMPHOCYTES # BLD AUTO: 2.35 K/UL
LYMPHOCYTES NFR BLD AUTO: 31.6 %
MAN DIFF?: NORMAL
MCHC RBC-ENTMCNC: 19 PG
MCHC RBC-ENTMCNC: 26.9 GM/DL
MCV RBC AUTO: 70.6 FL
MONOCYTES # BLD AUTO: 0.54 K/UL
MONOCYTES NFR BLD AUTO: 7.3 %
NEUTROPHILS # BLD AUTO: 4.09 K/UL
NEUTROPHILS NFR BLD AUTO: 55 %
PLATELET # BLD AUTO: 300 K/UL
POTASSIUM SERPL-SCNC: 5.1 MMOL/L
PROT SERPL-MCNC: 7.2 G/DL
RBC # BLD: 5.91 M/UL
RBC # FLD: 20.2 %
SODIUM SERPL-SCNC: 138 MMOL/L
WBC # FLD AUTO: 7.44 K/UL

## 2020-05-17 ENCOUNTER — INPATIENT (INPATIENT)
Facility: HOSPITAL | Age: 40
LOS: 0 days | Discharge: HOME | DRG: 274 | End: 2020-05-18
Attending: HOSPITALIST | Admitting: HOSPITALIST
Payer: MEDICAID

## 2020-05-17 VITALS
SYSTOLIC BLOOD PRESSURE: 90 MMHG | RESPIRATION RATE: 18 BRPM | TEMPERATURE: 98 F | HEART RATE: 114 BPM | DIASTOLIC BLOOD PRESSURE: 52 MMHG | WEIGHT: 190.04 LBS | OXYGEN SATURATION: 99 %

## 2020-05-17 DIAGNOSIS — Z95.0 PRESENCE OF CARDIAC PACEMAKER: Chronic | ICD-10-CM

## 2020-05-17 DIAGNOSIS — Z98.51 TUBAL LIGATION STATUS: Chronic | ICD-10-CM

## 2020-05-17 LAB
ALBUMIN SERPL ELPH-MCNC: 3.9 G/DL — SIGNIFICANT CHANGE UP (ref 3.3–5)
ALP SERPL-CCNC: 60 U/L — SIGNIFICANT CHANGE UP (ref 40–120)
ALT FLD-CCNC: 12 U/L — SIGNIFICANT CHANGE UP (ref 10–45)
ANION GAP SERPL CALC-SCNC: 16 MMOL/L — SIGNIFICANT CHANGE UP (ref 5–17)
AST SERPL-CCNC: 18 U/L — SIGNIFICANT CHANGE UP (ref 10–40)
BASOPHILS # BLD AUTO: 0 K/UL — SIGNIFICANT CHANGE UP (ref 0–0.2)
BASOPHILS NFR BLD AUTO: 0 % — SIGNIFICANT CHANGE UP (ref 0–2)
BILIRUB SERPL-MCNC: 0.4 MG/DL — SIGNIFICANT CHANGE UP (ref 0.2–1.2)
BUN SERPL-MCNC: 9 MG/DL — SIGNIFICANT CHANGE UP (ref 7–23)
CALCIUM SERPL-MCNC: 8.4 MG/DL — SIGNIFICANT CHANGE UP (ref 8.4–10.5)
CHLORIDE SERPL-SCNC: 106 MMOL/L — SIGNIFICANT CHANGE UP (ref 96–108)
CO2 SERPL-SCNC: 18 MMOL/L — LOW (ref 22–31)
CREAT SERPL-MCNC: 0.9 MG/DL — SIGNIFICANT CHANGE UP (ref 0.5–1.3)
EOSINOPHIL # BLD AUTO: 0.1 K/UL — SIGNIFICANT CHANGE UP (ref 0–0.5)
EOSINOPHIL NFR BLD AUTO: 1 % — SIGNIFICANT CHANGE UP (ref 0–6)
GLUCOSE SERPL-MCNC: 106 MG/DL — HIGH (ref 70–99)
HCT VFR BLD CALC: 39.7 % — SIGNIFICANT CHANGE UP (ref 34.5–45)
HGB BLD-MCNC: 11.5 G/DL — SIGNIFICANT CHANGE UP (ref 11.5–15.5)
LYMPHOCYTES # BLD AUTO: 1.36 K/UL — SIGNIFICANT CHANGE UP (ref 1–3.3)
LYMPHOCYTES # BLD AUTO: 14 % — SIGNIFICANT CHANGE UP (ref 13–44)
MAGNESIUM SERPL-MCNC: 1.8 MG/DL — SIGNIFICANT CHANGE UP (ref 1.6–2.6)
MCHC RBC-ENTMCNC: 18.7 PG — LOW (ref 27–34)
MCHC RBC-ENTMCNC: 29 GM/DL — LOW (ref 32–36)
MCV RBC AUTO: 64.7 FL — LOW (ref 80–100)
MONOCYTES # BLD AUTO: 0.49 K/UL — SIGNIFICANT CHANGE UP (ref 0–0.9)
MONOCYTES NFR BLD AUTO: 5 % — SIGNIFICANT CHANGE UP (ref 2–14)
NEUTROPHILS # BLD AUTO: 7.37 K/UL — SIGNIFICANT CHANGE UP (ref 1.8–7.4)
NEUTROPHILS NFR BLD AUTO: 76 % — SIGNIFICANT CHANGE UP (ref 43–77)
PHOSPHATE SERPL-MCNC: 2.7 MG/DL — SIGNIFICANT CHANGE UP (ref 2.5–4.5)
PLATELET # BLD AUTO: 392 K/UL — SIGNIFICANT CHANGE UP (ref 150–400)
POTASSIUM SERPL-MCNC: 3.7 MMOL/L — SIGNIFICANT CHANGE UP (ref 3.5–5.3)
POTASSIUM SERPL-SCNC: 3.7 MMOL/L — SIGNIFICANT CHANGE UP (ref 3.5–5.3)
PROT SERPL-MCNC: 7.1 G/DL — SIGNIFICANT CHANGE UP (ref 6–8.3)
RBC # BLD: 6.14 M/UL — HIGH (ref 3.8–5.2)
RBC # FLD: 19.2 % — HIGH (ref 10.3–14.5)
SARS-COV-2 RNA SPEC QL NAA+PROBE: SIGNIFICANT CHANGE UP
SODIUM SERPL-SCNC: 140 MMOL/L — SIGNIFICANT CHANGE UP (ref 135–145)
TROPONIN T, HIGH SENSITIVITY RESULT: 10 NG/L — SIGNIFICANT CHANGE UP (ref 0–51)
WBC # BLD: 9.7 K/UL — SIGNIFICANT CHANGE UP (ref 3.8–10.5)
WBC # FLD AUTO: 9.7 K/UL — SIGNIFICANT CHANGE UP (ref 3.8–10.5)

## 2020-05-17 PROCEDURE — 71046 X-RAY EXAM CHEST 2 VIEWS: CPT | Mod: 26

## 2020-05-17 PROCEDURE — 93010 ELECTROCARDIOGRAM REPORT: CPT

## 2020-05-17 PROCEDURE — 99220: CPT

## 2020-05-17 RX ORDER — SODIUM CHLORIDE 9 MG/ML
500 INJECTION INTRAMUSCULAR; INTRAVENOUS; SUBCUTANEOUS ONCE
Refills: 0 | Status: DISCONTINUED | OUTPATIENT
Start: 2020-05-17 | End: 2020-05-17

## 2020-05-17 RX ORDER — RIVAROXABAN 15 MG-20MG
10 KIT ORAL
Refills: 0 | Status: DISCONTINUED | OUTPATIENT
Start: 2020-05-17 | End: 2020-05-17

## 2020-05-17 RX ORDER — PANTOPRAZOLE SODIUM 20 MG/1
40 TABLET, DELAYED RELEASE ORAL
Refills: 0 | Status: DISCONTINUED | OUTPATIENT
Start: 2020-05-17 | End: 2020-05-18

## 2020-05-17 RX ORDER — IPRATROPIUM BROMIDE 0.2 MG/ML
500 SOLUTION, NON-ORAL INHALATION ONCE
Refills: 0 | Status: COMPLETED | OUTPATIENT
Start: 2020-05-17 | End: 2020-05-17

## 2020-05-17 RX ORDER — ACETAMINOPHEN 500 MG
650 TABLET ORAL ONCE
Refills: 0 | Status: COMPLETED | OUTPATIENT
Start: 2020-05-17 | End: 2020-05-17

## 2020-05-17 RX ORDER — SODIUM CHLORIDE 9 MG/ML
3 INJECTION INTRAMUSCULAR; INTRAVENOUS; SUBCUTANEOUS EVERY 8 HOURS
Refills: 0 | Status: DISCONTINUED | OUTPATIENT
Start: 2020-05-17 | End: 2020-05-18

## 2020-05-17 RX ORDER — TIOTROPIUM BROMIDE 18 UG/1
1 CAPSULE ORAL; RESPIRATORY (INHALATION) DAILY
Refills: 0 | Status: DISCONTINUED | OUTPATIENT
Start: 2020-05-17 | End: 2020-05-18

## 2020-05-17 RX ADMIN — Medication 500 MICROGRAM(S): at 22:22

## 2020-05-17 RX ADMIN — SODIUM CHLORIDE 3 MILLILITER(S): 9 INJECTION INTRAMUSCULAR; INTRAVENOUS; SUBCUTANEOUS at 22:03

## 2020-05-17 RX ADMIN — Medication 650 MILLIGRAM(S): at 16:19

## 2020-05-17 NOTE — ED CDU PROVIDER INITIAL DAY NOTE - ATTENDING CONTRIBUTION TO CARE
Patient with episode of SVT in field, evaluated by EP in Emergency Department, will observe in CDU overnight on telemetry for repeat arrythmia, hold anticoagulation or shaista blockers at this time, EP to re-evaluate tomorrow AM for possible ablation vs outpatient medical management.  Kirit Arroyo M.D.

## 2020-05-17 NOTE — ED CDU PROVIDER DISPOSITION NOTE - CLINICAL COURSE
40 y/o female PMHx pulmonary HTN w/ Class I/IV features (dx in 2014) on Remodulin infusions c/b RV systolic failure s/p PPM , chronic asthma on Atrovent and 2L nasal cannula overnight, chronic PE on Xarelto 10mg, GERD brought in via EMS after pt found to be in SVT receiving adenosine 6mg then 12mg converting to NSR. Patient was complaining of palpitations, dyspnea, and central sharp pressure 8/10. Patient concerned it felt like SVT as she had experience SVT in past most recently 2 weeks ago at [facility].  Currently not taking any shaista blockers. Patient currently denies any symptoms including CP, SOB, abdominal pain, N/V/D, fever cough, urinary symptoms, dizziness, headache, syncope. Patient is a non smoker. Mother has DM and Father has no medical problems   In ED, Labs unremarkable. Trop 9 and 10, COVID negative. Cardiology consulted who recommended CDU to monitor BP and tele overnight. 40 y/o female PMHx pulmonary HTN w/ Class I/IV features (dx in 2014) on Remodulin infusions c/b RV systolic failure s/p PPM , chronic asthma on Atrovent and 2L nasal cannula overnight, chronic PE on Xarelto 10mg, GERD brought in via EMS after pt found to be in SVT receiving adenosine 6mg then 12mg converting to NSR. Patient was complaining of palpitations, dyspnea, and central sharp pressure 8/10. Patient concerned it felt like SVT as she had experience SVT in past most recently 2 weeks ago at [facility].  Currently not taking any shaista blockers. Patient currently denies any symptoms including CP, SOB, abdominal pain, N/V/D, fever cough, urinary symptoms, dizziness, headache, syncope. Patient is a non smoker. Mother has DM and Father has no medical problems   In ED, Labs unremarkable. Trop 9 and 10, COVID negative. Cardiology consulted who recommended CDU to monitor BP and tele overnight. No significant events on tele overnight. EP attending Dr. 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 Patient verbalized understanding and agreement with plan and d/w Dr. Arroyo for admission.

## 2020-05-17 NOTE — ED ADULT NURSE REASSESSMENT NOTE - NS ED NURSE REASSESS COMMENT FT1
Pt. resting comfortably in stretcher in no acute distress. AOx4, on phone with family member. Pt. states family will bring her remodulin. Pt. aware she will be going to CDU upon arrival of her remodulin medication. VSS. Will continue to reassess.

## 2020-05-17 NOTE — ED CDU PROVIDER INITIAL DAY NOTE - OBJECTIVE STATEMENT
38 y/o female PMHx pulmonary HTN w/ Class I/IV features (dx in 2014) on Remodulin infusions c/b RV systolic failure s/p PPM , chronic asthma on Atrovent and 2L nasal cannula overnight, chronic PE on Xarelto 10mg, GERD brought in via EMS after pt found to be in SVT receiving adenosine 6mg then 12mg converting to NSR. Patient was complaining of palpitations, dyspnea, and central sharp pressure 8/10. Patient concerned it felt like SVT as she had experience SVT in past most recently 2 weeks ago at [facility].  Currently not taking any shaista blockers. Patient currently denies any symptoms including CP, SOB, abdominal pain, N/V/D, fever cough, urinary symptoms, dizziness, headache, syncope. Patient is a non smoker. Mother has DM and Father has no medical problems     ED course: Labs unremarkable. Trop 9, COVID negative. Cardiology consulted who recommended CDU to monitor BP and tele overnight. 38 y/o female PMHx pulmonary HTN w/ Class I/IV features (dx in 2014) on Remodulin infusions c/b RV systolic failure s/p PPM , chronic asthma on Atrovent and 2L nasal cannula overnight, chronic PE on Xarelto 10mg, GERD brought in via EMS after pt found to be in SVT receiving adenosine 6mg then 12mg converting to NSR. Patient was complaining of palpitations, dyspnea, and central sharp pressure 8/10. Patient concerned it felt like SVT as she had experience SVT in past most recently 2 weeks ago at [facility].  Currently not taking any shaista blockers. Patient currently denies any symptoms including CP, SOB, abdominal pain, N/V/D, fever cough, urinary symptoms, dizziness, headache, syncope. Patient is a non smoker. Mother has DM and Father has no medical problems     ED course: Labs unremarkable. Trop 9, COVID negative. Cardiology consulted who recommended CDU to monitor BP and tele overnight.

## 2020-05-17 NOTE — ED ADULT NURSE REASSESSMENT NOTE - NS ED NURSE REASSESS COMMENT FT1
Pt. resting comfortably in stretcher in no acute distress. AOx4. VSS. Pt. states family member will drop off pulmonary HTN medication tonight.

## 2020-05-17 NOTE — ED ADULT NURSE NOTE - NSIMPLEMENTINTERV_GEN_ALL_ED
Implemented All Universal Safety Interventions:  [place] to call system. Call bell, personal items and telephone within reach. Instruct patient to call for assistance. Room bathroom lighting operational. Non-slip footwear when patient is off stretcher. Physically safe environment: no spills, clutter or unnecessary equipment. Stretcher in lowest position, wheels locked, appropriate side rails in place.

## 2020-05-17 NOTE — ED CDU PROVIDER INITIAL DAY NOTE - PROGRESS NOTE DETAILS
CDU PROGRESS NOTE RAYMOND ROTH: Received pt at 1900 sign-out. Case/plan reviewed. Patient evaluated by EP, recommend hold Xarelto, hold off on metoprolol, monitor on tele and NPO for possible ablation tomorrow. CDU PROGRESS NOTE PA IRA: Patient returned from Chest x ray. VSS. Pt ambulatory around unit with steady gait. S1 S2 noted, RRR, lungs CTA b/l, BS x4 with soft, nontender abdomen. Pt without complaints. Will c/w remodulin and follow results. CDU PROGRESS NOTE PA IRA: Pt resting comfortably, feeling well without complaint. NAD, VSS. Chest XRay clear lungs, No events on telemetry.

## 2020-05-17 NOTE — ED CDU PROVIDER DISPOSITION NOTE - NSFOLLOWUPINSTRUCTIONS_ED_ALL_ED_FT
As per Cardiology..................  Follow up with your Primary Care Physician within the next 2-3 days  Bring a copy of your test results with you to your appointment  Continue your current medication regimen  Return to the Emergency Room if you experience new or worsening symptoms

## 2020-05-17 NOTE — ED PROVIDER NOTE - ATTENDING CONTRIBUTION TO CARE
Patient BIBEMS for SVT.  History of R sided heart failure and SVT in past, began having palpitations earlier this morning like prior episodes.  Received adenosine 6mg/12mg en route with resolution of SVT.  Chest dyscomfort now resolved.  Symptoms began suddenly, persisted and did not resolve until medications from EMS.  Reporting compliant with medications.  Denying fevers, chills, cough, abdominal pains, weight gain, extremity swelling.    A 14 point review of systems is negative except as in HPI or otherwise documented.    Exam:  General: Patient well appearing, vital signs within normal limits  HEENT: airway patent with moist mucous membranes  Cardiac: regular tachycardia S1/S2 with strong peripheral pulses  Respiratory: lungs clear without respiratory distress  GI: abdomen soft, non tender, non distended  Neuro: no gross neurologic deficits  Skin: warm, well perfused  Psych: normal mood and affect    Patient with known heart failure presenting with SVT s/p chemical cardioversion by EMS, now sinus rhythm on monitor.  Will obtain labs to evaluate for electrolyte abnormality causing SVT, troponin to screen for ACS, telemetry monitoring for return of SVT, cardiology consultation (patient follows with heart failure service at [facility])

## 2020-05-17 NOTE — ED CDU PROVIDER INITIAL DAY NOTE - DETAILS
38 y/o female PMHx pulmonary HTN w/ Class I/IV features (dx in 2014) on Remodulin infusions c/b RV systolic failure s/p PPM , chronic asthma on Atrovent and 2L nasal cannula overnight, chronic PE on Xarelto 10mg, GERD brought in via EMS after pt found to be in SVT receiving adenosine 6mg then 12mg converting to NSR.  *PALPITATIONS  -tele monitoring   -delta trop  -cardiology re-eval  -Discussed with Dr. Arroyo

## 2020-05-17 NOTE — ED PROVIDER NOTE - CLINICAL SUMMARY MEDICAL DECISION MAKING FREE TEXT BOX
38 y/o F hx of PAH c/b RV systolic failure s/p PPM, chronic PE on Xarelto, SVT presenting with episode of SVT.  Converted to NSR with adenosine.   Unclear etiology; r/o electrolyte derangement, ischemic event.   Cardiology evaluation for further recommendations.

## 2020-05-17 NOTE — ED PROVIDER NOTE - PHYSICAL EXAMINATION
GENERAL: no acute distress; anxious appearing.   HEAD:  Atraumatic, Normocephalic  EYES: EOMI, PERRLA, conjunctiva and sclera clear  ENT: MMM; oropharynx clear  NECK: Supple, No JVD  CHEST/LUNG: Clear to auscultation bilaterally; No wheeze  HEART: Tachycardic No murmurs, rubs, or gallops  ABDOMEN: Soft, Nontender, Nondistended; Bowel sounds present  EXTREMITIES:  2+ Peripheral Pulses, No clubbing, cyanosis, or edema  PSYCH: AAOx3  NEUROLOGY: no focal motor or sensory deficits. 5/5 muscle strength in all extremities.   SKIN: No rashes or lesions

## 2020-05-17 NOTE — ED ADULT NURSE REASSESSMENT NOTE - NS ED NURSE REASSESS COMMENT FT1
Received pt from [facility], received pt alert and responsive, oriented x4, denies any respiratory distress, SOB, or difficulty breathing. Pt transferred to CDU for telemetry monitoring. Pt is NSR on telemetry hr: 80's. Pt has no complaints of chest pain, chest pressure, SOB, heart palpitations, diaphoresis, dizziness, lightheadedness, N/V, F/C. IV in place, patent and free of signs of infiltration, V/S stable, pt afebrile, pt denies pain at this time. Pt educated on unit and unit rules, instructed patient to notify RN of any needed assistance, Pt verbalizes understanding, Call bell placed within reach. Safety maintained. Will continue to monitor.

## 2020-05-17 NOTE — ED ADULT NURSE REASSESSMENT NOTE - COMFORT CARE
NPO./plan of care explained/warm blanket provided/darkened lights/ambulated to bathroom/repositioned

## 2020-05-17 NOTE — ED ADULT NURSE NOTE - OBJECTIVE STATEMENT
38 y/o female coming in via EMS from home complaining of rapid heart beat. AOx4, ambulatory, PMH tubal ligation, DNC, idiopathic pulmonary HTN, right sided heart failure, pacemaker, on Xarelto. Pt. was experiencing chest pain and palpitations at home. pt. reports that she broke the SVT with vagal maneuvers at home but then the rhythm came back. Hospitalized 2 weeks ago with same issue, d/c a few days ago. Pt. called 911 and received 18 mg adenosine en route which did not resolve the issue. Upon arrival, pt. in sinus tachycardia, BP stable at 119/68, VW=990. Pt. placed on cardiac monitor and EKG completed immediately. Reports testing negative for covid approx. 2 weeks ago. Pt. denying any chest pain currently, denies SOB, fever, chills, N/V/D. 38 y/o female coming in via EMS from home complaining of rapid heart beat. AOx4, ambulatory, PMH tubal ligation, DNC, idiopathic pulmonary HTN, right sided heart failure, pacemaker, on Xarelto for chronic PE. Pt. was experiencing chest pain and palpitations at home. Pt. reports that she knew she was in SVT but was unable to resolve it with vagal maneuvers. Hospitalized 2 weeks ago with same issue, d/c from [place] a few days ago. Pt. called 911 and received 6 mg and then 12 mg adenosine en route which resolved the issue. Upon arrival, pt. in sinus tachycardia, BP stable at 119/68, YK=199. Pt. placed on cardiac monitor and EKG completed immediately. Reports testing negative for covid approx. 2 weeks ago. Pt. denying any chest pain currently, denies SOB, fever, chills, N/V/D. 20G R hand placed by EMS. VSS. Will continue to reassess.

## 2020-05-17 NOTE — CONSULT NOTE ADULT - SUBJECTIVE AND OBJECTIVE BOX
HISTORY OF PRESENT ILLNESS:  40 YO With Group I PAH with chronic RV systolic failure presents with chest pain that started earlier today. Started suddenly while sitting then checked pulse and was >200. Developed chest pain and some palpitations Not associated iwth nausea, vomiting diaphoresis. No dizziness, lightheadedness. No numbness, weakness or paraesthesias. Pt. has had history of tacadshycardia and is on treprostinil at 16 ng/kg/min. Was recently on metoprolol but that was stopped in december. pt. has not been having worsening heart failure symptoms. No swelling, edema, weight gain, changes in exercise tolerance.     Pt. was given 6mg->12mg adenosine with cardioversion to sinus rhythm. In the ED patient 90/60 but asymptomatic. Given 500cc NS bolus. Cardiology consulted for assistance with tachycardia.         Allergies    penicillin (Unknown)  vancomycin (Unknown)    Intolerances    	    MEDICATIONS:                  PAST MEDICAL & SURGICAL HISTORY:  PE (pulmonary thromboembolism): on coumadin  Asthma with status asthmaticus, unspecified asthma severity  Sinus tachycardia  Asthma  Pulmonary embolism  Pulmonary hypertension  Anemia: On home O2 at 2L via NC  Tachycardia  Pulmonary hypertension  No significant past surgical history  History of tubal ligation      FAMILY HISTORY:  No pertinent family history in first degree relatives  Family history of diabetes mellitus in mother  Family history of diabetes mellitus      SOCIAL HISTORY:    [ ] Non-smoker  [ ] Smoker  [ ] Alcohol      REVIEW OF SYSTEMS:  General: no fatigue/malaise, weight loss/gain.  Skin: no rashes.  Ophthalmologic: no blurred vision, no loss of vision. 	  ENT: no sore throat, rhinorrhea, sinus congestion.  Respiratory: no SOB, cough or wheeze.  Gastrointestinal:  no N/V/D, no melena/hematemesis/hematochezia.  Genitourinary: no dysuria/hesitancy or hematuria.  Musculoskeletal: no myalgias or arthralgias.  Neurological: no changes in vision or hearing, no lightheadedness/dizziness, no syncope/near syncope	  Psychiatric: no unusual stress/anxiety.   Hematology/Lymphatics: no unusual bleeding, bruising and no lymphadenopathy.  Endocrine: no unusual thirst.   All others negative except as stated above and in HPI.    PHYSICAL EXAM:  T(C): 37.1 (05-17-20 @ 14:56), Max: 37.1 (05-17-20 @ 14:56)  HR: 89 (05-17-20 @ 17:33) (89 - 114)  BP: 110/76 (05-17-20 @ 17:33) (87/67 - 119/68)  RR: 17 (05-17-20 @ 17:33) (16 - 18)  SpO2: 96% (05-17-20 @ 17:33) (96% - 99%)  Wt(kg): --  I&O's Summary      Appearance: Normal	  HEENT:   Normal oral mucosa, PERRL, EOMI	  Lymphatic: No lymphadenopathy  Cardiovascular: Normal S1 S2, No JVD, No murmurs, No edema  Respiratory: Lungs clear to auscultation	  Psychiatry: A & O x 3, Mood & affect appropriate  Gastrointestinal:  Soft, Non-tender, + BS	  Skin: No rashes, No ecchymoses, No cyanosis	  Neurologic: Non-focal  Extremities: Normal range of motion, No clubbing, cyanosis or edema  Vascular: Peripheral pulses palpable 2+ bilaterally        LABS:	 	    CBC Full  -  ( 17 May 2020 15:06 )  WBC Count : 9.70 K/uL  Hemoglobin : 11.5 g/dL  Hematocrit : 39.7 %  Platelet Count - Automated : 392 K/uL  Mean Cell Volume : 64.7 fl  Mean Cell Hemoglobin : 18.7 pg  Mean Cell Hemoglobin Concentration : 29.0 gm/dL  Auto Neutrophil # : 7.37 K/uL  Auto Lymphocyte # : 1.36 K/uL  Auto Monocyte # : 0.49 K/uL  Auto Eosinophil # : 0.10 K/uL  Auto Basophil # : 0.00 K/uL  Auto Neutrophil % : 76.0 %  Auto Lymphocyte % : 14.0 %  Auto Monocyte % : 5.0 %  Auto Eosinophil % : 1.0 %  Auto Basophil % : 0.0 %    05-17    140  |  106  |  9   ----------------------------<  106<H>  3.7   |  18<L>  |  0.90    Ca    8.4      17 May 2020 15:06  Phos  2.7     05-17  Mg     1.8     05-17    TPro  7.1  /  Alb  3.9  /  TBili  0.4  /  DBili  x   /  AST  18  /  ALT  12  /  AlkPhos  60  05-17      proBNP:   Lipid Profile:   HgA1c:   TSH:       CARDIAC MARKERS:        TELEMETRY: 	    ECG:  	sinus rhythm with Right axis deviation incomplete RBBB  RADIOLOGY: OTHER: 	    PREVIOUS DIAGNOSTIC TESTING:    [ ] Echocardiogram:  < from: Transthoracic Echocardiogram (11.21.19 @ 08:17) >  Conclusions:  1. Normal left ventricular internal dimensions and wall  thicknesses.  2. Normal left ventricular systolic function. No segmental  wall motion abnormalities. Septal motion consistent with  right ventricular overload.  3. Right atrial enlargement.  4. Right ventricular enlargement with decreased right  ventricular systolic function.  5. Normal tricuspidvalve. Mild tricuspid regurgitation.  6. Estimated pulmonary artery systolic pressure equals 55  mm Hg, assuming right atrial pressure equals 8 mm Hg,  consistent with moderate pulmonary pressures.  *** Compared with echocardiogram of 5/21/2018, estimated  PASP is reduced.    < end of copied text >    [ ]  Catheterization:    [ ] Stress Test:  	  	  ASSESSMENT/PLAN: 	  40 YO F with WHO Group I PAH, with chronic RV systolic failure presented to the ED with palpitations. 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     Tachycardia  Likely AVNRT  No heart failure symptoms at moment   Would consider EP consult in the AM for possible ablation   Since patient on xarelto, would need to be held 1-2 days prior to procedure.   Would hold off on metoprolol at the moment since in Normal Sinus rhythm and would potentially limit diagnostic yield of EP study as well as could exacerbate chronic rv systolic failure  If patient develops AVNRT again over night would consider starting low dose metoprolol  Will discuss with HF attending in the AM.     PAH  c/w xarelto  c/w remodulin    Please call cardiology with changes in clinical condition or with questions      [place]  Cardiology Fellow  [phone]  All Cardiology service information can be found 24/7 on [email or web address], password: [email or web address] outdoors HISTORY OF PRESENT ILLNESS:  40 YO With Group I PAH with chronic RV systolic failure presents with chest pain that started earlier today. Started suddenly while sitting then checked pulse and was >200. Developed chest pain and some palpitations Not associated iwth nausea, vomiting diaphoresis. No dizziness, lightheadedness. No numbness, weakness or paraesthesias. Pt. has had history of tacadshycardia and is on treprostinil at 16 ng/kg/min. Was recently on metoprolol but that was stopped in december. pt. has not been having worsening heart failure symptoms. No swelling, edema, weight gain, changes in exercise tolerance.     Pt. was given 6mg->12mg adenosine with cardioversion to sinus rhythm. In the ED patient 90/60 but asymptomatic. Given 500cc NS bolus. Cardiology consulted for assistance with tachycardia.         Allergies    penicillin (Unknown)  vancomycin (Unknown)    Intolerances    	    MEDICATIONS:                  PAST MEDICAL & SURGICAL HISTORY:  PE (pulmonary thromboembolism): on coumadin  Asthma with status asthmaticus, unspecified asthma severity  Sinus tachycardia  Asthma  Pulmonary embolism  Pulmonary hypertension  Anemia: On home O2 at 2L via NC  Tachycardia  Pulmonary hypertension  No significant past surgical history  History of tubal ligation      FAMILY HISTORY:  No pertinent family history in first degree relatives  Family history of diabetes mellitus in mother  Family history of diabetes mellitus      SOCIAL HISTORY:    [ ] Non-smoker  [ ] Smoker  [ ] Alcohol      REVIEW OF SYSTEMS:  General: no fatigue/malaise, weight loss/gain.  Skin: no rashes.  Ophthalmologic: no blurred vision, no loss of vision. 	  ENT: no sore throat, rhinorrhea, sinus congestion.  Respiratory: no SOB, cough or wheeze.  Gastrointestinal:  no N/V/D, no melena/hematemesis/hematochezia.  Genitourinary: no dysuria/hesitancy or hematuria.  Musculoskeletal: no myalgias or arthralgias.  Neurological: no changes in vision or hearing, no lightheadedness/dizziness, no syncope/near syncope	  Psychiatric: no unusual stress/anxiety.   Hematology/Lymphatics: no unusual bleeding, bruising and no lymphadenopathy.  Endocrine: no unusual thirst.   All others negative except as stated above and in HPI.    PHYSICAL EXAM:  T(C): 37.1 (05-17-20 @ 14:56), Max: 37.1 (05-17-20 @ 14:56)  HR: 89 (05-17-20 @ 17:33) (89 - 114)  BP: 110/76 (05-17-20 @ 17:33) (87/67 - 119/68)  RR: 17 (05-17-20 @ 17:33) (16 - 18)  SpO2: 96% (05-17-20 @ 17:33) (96% - 99%)  Wt(kg): --  I&O's Summary      Appearance: Normal	  HEENT:   Normal oral mucosa, PERRL, EOMI	  Lymphatic: No lymphadenopathy  Cardiovascular: Normal S1 S2, No JVD, No murmurs, No edema  Respiratory: Lungs clear to auscultation	  Psychiatry: A & O x 3, Mood & affect appropriate  Gastrointestinal:  Soft, Non-tender, + BS	  Skin: No rashes, No ecchymoses, No cyanosis	  Neurologic: Non-focal  Extremities: Normal range of motion, No clubbing, cyanosis or edema  Vascular: Peripheral pulses palpable 2+ bilaterally        LABS:	 	    CBC Full  -  ( 17 May 2020 15:06 )  WBC Count : 9.70 K/uL  Hemoglobin : 11.5 g/dL  Hematocrit : 39.7 %  Platelet Count - Automated : 392 K/uL  Mean Cell Volume : 64.7 fl  Mean Cell Hemoglobin : 18.7 pg  Mean Cell Hemoglobin Concentration : 29.0 gm/dL  Auto Neutrophil # : 7.37 K/uL  Auto Lymphocyte # : 1.36 K/uL  Auto Monocyte # : 0.49 K/uL  Auto Eosinophil # : 0.10 K/uL  Auto Basophil # : 0.00 K/uL  Auto Neutrophil % : 76.0 %  Auto Lymphocyte % : 14.0 %  Auto Monocyte % : 5.0 %  Auto Eosinophil % : 1.0 %  Auto Basophil % : 0.0 %    05-17    140  |  106  |  9   ----------------------------<  106<H>  3.7   |  18<L>  |  0.90    Ca    8.4      17 May 2020 15:06  Phos  2.7     05-17  Mg     1.8     05-17    TPro  7.1  /  Alb  3.9  /  TBili  0.4  /  DBili  x   /  AST  18  /  ALT  12  /  AlkPhos  60  05-17      proBNP:   Lipid Profile:   HgA1c:   TSH:       CARDIAC MARKERS:        TELEMETRY: 	    ECG:  	sinus rhythm with Right axis deviation incomplete RBBB  RADIOLOGY: OTHER: 	    PREVIOUS DIAGNOSTIC TESTING:    [ ] Echocardiogram:  < from: Transthoracic Echocardiogram (11.21.19 @ 08:17) >  Conclusions:  1. Normal left ventricular internal dimensions and wall  thicknesses.  2. Normal left ventricular systolic function. No segmental  wall motion abnormalities. Septal motion consistent with  right ventricular overload.  3. Right atrial enlargement.  4. Right ventricular enlargement with decreased right  ventricular systolic function.  5. Normal tricuspidvalve. Mild tricuspid regurgitation.  6. Estimated pulmonary artery systolic pressure equals 55  mm Hg, assuming right atrial pressure equals 8 mm Hg,  consistent with moderate pulmonary pressures.  *** Compared with echocardiogram of 5/21/2018, estimated  PASP is reduced.    < end of copied text >    [ ]  Catheterization:    [ ] Stress Test:  	  	  ASSESSMENT/PLAN: 	  40 YO F with WHO Group I PAH, with chronic RV systolic failure presented to the ED with palpitations. 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     Tachycardia  Likely AVNRT but could be other atrial arrythmias  No heart failure symptoms at moment   As per patient PE was ~3 years ago, would hold xarelto  Would hold off on metoprolol at the moment since in Normal Sinus rhythm and would potentially limit diagnostic yield of EP study as well as could exacerbate chronic rv systolic failure  Would monitor on tele  ?PPM, please obtain CXR  NPO for possible ablation tomorrow  Would obtain covid swab for routine preprocedural screening      PAH  hold xarelto  c/w remodulin    Please call cardiology with changes in clinical condition or with questions      [place]  Cardiology Fellow  [phone]  All Cardiology service information can be found 24/7 on [email or web address], password: [email or web address]

## 2020-05-17 NOTE — ED PROVIDER NOTE - OBJECTIVE STATEMENT
38 y/o F hx of PAH c/b RV systolic failure s/p PPM, chronic PE on Xarelto, SVT presenting with palpitations.     Patient experienced acute onset of chest pain and palpitations 2-3 hrs prior to arrival. Paramedics noted patient to be in SVT; received 6 mg of adenosine followed by 12mg with conversion to NSR. Arrives in sinus tach; chest pain resolved; felt dyspneic but now improving. No fevers/chills, cough. Recently discharged from OSH for SVT management. Currently not taking any shaista blockers. 38 y/o F hx of PAH c/b RV systolic failure s/p PPM on supplemental oxygen qHS, chronic PE on Xarelto, SVT presenting with palpitations.     Patient experienced acute onset of chest pain and palpitations 2-3 hrs prior to arrival. Paramedics noted patient to be in SVT; received 6 mg of adenosine followed by 12mg with conversion to NSR. Arrives in sinus tach; chest pain resolved; felt dyspneic but now improving. No fevers/chills, cough. Recently discharged from OSH for SVT management. Currently not taking any shaista blockers.

## 2020-05-17 NOTE — ED CDU PROVIDER DISPOSITION NOTE - ATTENDING CONTRIBUTION TO CARE
Patient in CDU for telemetry monitoring for episode of SVT in setting of RHF, no events overnight, EP re-evaluated this morning requesting admission for ablation procedure, will admit to hospitalists.  Kirit Arroyo M.D.

## 2020-05-17 NOTE — ED CDU PROVIDER INITIAL DAY NOTE - FAMILY HISTORY
Family history of diabetes mellitus     Mother  Still living? Yes, Estimated age: Age Unknown  Family history of diabetes mellitus in mother, Age at diagnosis: Age Unknown

## 2020-05-18 ENCOUNTER — TRANSCRIPTION ENCOUNTER (OUTPATIENT)
Age: 40
End: 2020-05-18

## 2020-05-18 VITALS
RESPIRATION RATE: 21 BRPM | HEART RATE: 88 BPM | DIASTOLIC BLOOD PRESSURE: 70 MMHG | OXYGEN SATURATION: 100 % | SYSTOLIC BLOOD PRESSURE: 100 MMHG | WEIGHT: 190.04 LBS | TEMPERATURE: 98 F

## 2020-05-18 DIAGNOSIS — I26.99 OTHER PULMONARY EMBOLISM WITHOUT ACUTE COR PULMONALE: ICD-10-CM

## 2020-05-18 DIAGNOSIS — I47.1 SUPRAVENTRICULAR TACHYCARDIA: ICD-10-CM

## 2020-05-18 DIAGNOSIS — Z29.9 ENCOUNTER FOR PROPHYLACTIC MEASURES, UNSPECIFIED: ICD-10-CM

## 2020-05-18 DIAGNOSIS — L98.9 DISORDER OF THE SKIN AND SUBCUTANEOUS TISSUE, UNSPECIFIED: ICD-10-CM

## 2020-05-18 DIAGNOSIS — I27.20 PULMONARY HYPERTENSION, UNSPECIFIED: ICD-10-CM

## 2020-05-18 DIAGNOSIS — J45.909 UNSPECIFIED ASTHMA, UNCOMPLICATED: ICD-10-CM

## 2020-05-18 DIAGNOSIS — D64.9 ANEMIA, UNSPECIFIED: ICD-10-CM

## 2020-05-18 DIAGNOSIS — R00.2 PALPITATIONS: ICD-10-CM

## 2020-05-18 LAB
BLD GP AB SCN SERPL QL: NEGATIVE — SIGNIFICANT CHANGE UP
RH IG SCN BLD-IMP: POSITIVE — SIGNIFICANT CHANGE UP

## 2020-05-18 PROCEDURE — C1732: CPT

## 2020-05-18 PROCEDURE — 80053 COMPREHEN METABOLIC PANEL: CPT

## 2020-05-18 PROCEDURE — 93653 COMPRE EP EVAL TX SVT: CPT

## 2020-05-18 PROCEDURE — 93613 INTRACARDIAC EPHYS 3D MAPG: CPT

## 2020-05-18 PROCEDURE — C1894: CPT

## 2020-05-18 PROCEDURE — 93005 ELECTROCARDIOGRAM TRACING: CPT

## 2020-05-18 PROCEDURE — 86900 BLOOD TYPING SEROLOGIC ABO: CPT

## 2020-05-18 PROCEDURE — 99285 EMERGENCY DEPT VISIT HI MDM: CPT | Mod: 25

## 2020-05-18 PROCEDURE — C1730: CPT

## 2020-05-18 PROCEDURE — 99223 1ST HOSP IP/OBS HIGH 75: CPT | Mod: GC

## 2020-05-18 PROCEDURE — 94640 AIRWAY INHALATION TREATMENT: CPT

## 2020-05-18 PROCEDURE — C1766: CPT

## 2020-05-18 PROCEDURE — 71046 X-RAY EXAM CHEST 2 VIEWS: CPT

## 2020-05-18 PROCEDURE — 83735 ASSAY OF MAGNESIUM: CPT

## 2020-05-18 PROCEDURE — 85027 COMPLETE CBC AUTOMATED: CPT

## 2020-05-18 PROCEDURE — C1731: CPT

## 2020-05-18 PROCEDURE — 86850 RBC ANTIBODY SCREEN: CPT

## 2020-05-18 PROCEDURE — 84484 ASSAY OF TROPONIN QUANT: CPT

## 2020-05-18 PROCEDURE — 93623 PRGRMD STIMJ&PACG IV RX NFS: CPT

## 2020-05-18 PROCEDURE — G0378: CPT

## 2020-05-18 PROCEDURE — 84100 ASSAY OF PHOSPHORUS: CPT

## 2020-05-18 PROCEDURE — 86901 BLOOD TYPING SEROLOGIC RH(D): CPT

## 2020-05-18 PROCEDURE — 93623 PRGRMD STIMJ&PACG IV RX NFS: CPT | Mod: 26

## 2020-05-18 PROCEDURE — 99217: CPT

## 2020-05-18 RX ORDER — IPRATROPIUM BROMIDE 0.2 MG/ML
500 SOLUTION, NON-ORAL INHALATION ONCE
Refills: 0 | Status: COMPLETED | OUTPATIENT
Start: 2020-05-18 | End: 2020-05-18

## 2020-05-18 RX ORDER — ACETAMINOPHEN 500 MG
2 TABLET ORAL
Qty: 0 | Refills: 0 | DISCHARGE
Start: 2020-05-18

## 2020-05-18 RX ORDER — IPRATROPIUM BROMIDE 0.2 MG/ML
500 SOLUTION, NON-ORAL INHALATION EVERY 6 HOURS
Refills: 0 | Status: DISCONTINUED | OUTPATIENT
Start: 2020-05-18 | End: 2020-05-18

## 2020-05-18 RX ORDER — FONDAPARINUX SODIUM 2.5 MG/.5ML
1 INJECTION, SOLUTION SUBCUTANEOUS
Qty: 0 | Refills: 0 | DISCHARGE

## 2020-05-18 RX ORDER — ASCORBIC ACID 60 MG
500 TABLET,CHEWABLE ORAL DAILY
Refills: 0 | Status: DISCONTINUED | OUTPATIENT
Start: 2020-05-18 | End: 2020-05-18

## 2020-05-18 RX ORDER — OMEPRAZOLE 10 MG/1
1 CAPSULE, DELAYED RELEASE ORAL
Qty: 0 | Refills: 0 | DISCHARGE

## 2020-05-18 RX ORDER — FERROUS SULFATE 325(65) MG
325 TABLET ORAL DAILY
Refills: 0 | Status: DISCONTINUED | OUTPATIENT
Start: 2020-05-18 | End: 2020-05-18

## 2020-05-18 RX ORDER — ACETAMINOPHEN 500 MG
650 TABLET ORAL EVERY 6 HOURS
Refills: 0 | Status: DISCONTINUED | OUTPATIENT
Start: 2020-05-18 | End: 2020-05-18

## 2020-05-18 RX ORDER — TREPROSTINIL 48 UG/1
0 INHALANT ORAL
Qty: 0 | Refills: 0 | DISCHARGE

## 2020-05-18 RX ADMIN — PANTOPRAZOLE SODIUM 40 MILLIGRAM(S): 20 TABLET, DELAYED RELEASE ORAL at 00:28

## 2020-05-18 RX ADMIN — Medication 1 APPLICATION(S): at 17:51

## 2020-05-18 RX ADMIN — Medication 500 MICROGRAM(S): at 04:36

## 2020-05-18 RX ADMIN — SODIUM CHLORIDE 3 MILLILITER(S): 9 INJECTION INTRAMUSCULAR; INTRAVENOUS; SUBCUTANEOUS at 05:08

## 2020-05-18 RX ADMIN — Medication 500 MICROGRAM(S): at 18:18

## 2020-05-18 RX ADMIN — Medication 650 MILLIGRAM(S): at 16:00

## 2020-05-18 NOTE — H&P ADULT - ASSESSMENT
Pt is a 38 yo F w/ PMHx Group I PAH on remodulin, complicated by RV systolic failure s/p PPM, on home O2 prn (2L NC at night), chronic PE (diagnosed 2017) on Xarelto, SVT, asthma presenting with palpitations and  chest pain in setting of SVT. Pt pending ablation today.

## 2020-05-18 NOTE — H&P ADULT - NSHPPHYSICALEXAM_GEN_ALL_CORE
GENERAL: NAD, well-groomed, well-developed  HEAD:  Atraumatic, Normocephalic  EYES: EOMI, PERRLA, conjunctiva and sclera clear  ENMT: No tonsillar erythema, exudates, or enlargement; Moist mucous membranes  NECK: Supple, No JVD, Normal thyroid  HEART: Regular rate and rhythm; No murmurs, rubs, or gallops  RESPIRATORY: CTA B/L, No W/R/R  ABDOMEN: Soft, Nontender, Nondistended; Bowel sounds present  NEUROLOGY: A&Ox3, nonfocal, moving all extremities  EXTREMITIES:  2+ Peripheral Pulses, No clubbing, cyanosis, or edema  SKIN: warm, dry, normal color, no rash or abnormal lesions GENERAL: NAD, well-groomed, well-developed. On 2L NC  HEAD:  Atraumatic, Normocephalic  EYES: EOMI, PERRLA, conjunctiva and sclera clear  ENMT: Moist mucous membranes  NECK: Supple, No JVD   HEART: Regular rate and rhythm; No murmurs, rubs, or gallops  RESPIRATORY: CTA B/L   ABDOMEN: Soft, Nontender, Nondistended; Bowel sounds present. LLQ infusion site appears uninfected  NEUROLOGY: A&Ox3, nonfocal, moving all extremities  EXTREMITIES:  2+ Peripheral Pulses, No clubbing, cyanosis, or edema  SKIN: warm, dry, normal color, no rash. pedunculated soft mass on inner L thigh

## 2020-05-18 NOTE — DISCHARGE NOTE PROVIDER - NSDCPNSUBOBJ_GEN_ALL_CORE
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 No inducible SVT after ablation on isoproterenol     Her dual chamber pacemaker was interrogated and programmed pre and post ablation.         Bilateral Groin sites benign, VSS, c/o mild headache,     Tylenol 650mg be discharge home after 4 hours of recovery,     pt will f/u with Dr. Yoon and EP Clinic prn

## 2020-05-18 NOTE — DISCHARGE NOTE PROVIDER - NSDCCPCAREPLAN_GEN_ALL_CORE_FT
PRINCIPAL DISCHARGE DIAGNOSIS  Diagnosis: Palpitation  Assessment and Plan of Treatment: Take medication (Xarelto tonight) and f/u with cardiologist and PCP in 2 weeks. PRINCIPAL DISCHARGE DIAGNOSIS  Diagnosis: Palpitation  Assessment and Plan of Treatment: Take medication (Xarelto tonight) and f/u with cardiologist and PCP in 2 weeks.      SECONDARY DISCHARGE DIAGNOSES  Diagnosis: PAH (pulmonary artery hypertension)  Assessment and Plan of Treatment: Continue to f/u with Dr. Yoon and use Remodulin as directed.

## 2020-05-18 NOTE — DISCHARGE NOTE PROVIDER - NSDCMRMEDTOKEN_GEN_ALL_CORE_FT
Actamin 325 mg oral tablet: 2 tab(s) orally every 6 hours, As needed, Temp greater or equal to 38C (100.4F), Moderate Pain (4 - 6)  ferrous sulfate 325 mg (65 mg elemental iron) oral tablet: 1 tab(s) orally , As Needed  ipratropium 500 mcg/2.5 mL inhalation solution: 2.5 milliliter(s) inhaled , As Needed  omeprazole 40 mg oral delayed release capsule: 1 cap(s) orally once a day  Remodulin 2.5 mg/mL injectable solution: 3 milliliter(s) subcutaneous once every 3 days  Vitamin C 500 mg oral tablet: 1 tab(s) orally once a day  Xarelto 10 mg oral tablet: 1 tab(s) orally once a day Actamin 325 mg oral tablet: 2 tab(s) orally every 6 hours, As needed, Temp greater or equal to 38C (100.4F), Moderate Pain (4 - 6)  Clobetasol (Eqv-Temovate E) 0.05% topical cream: Apply topically to affected area 2 times a day  for 3days  ferrous sulfate 325 mg (65 mg elemental iron) oral tablet: 1 tab(s) orally , As Needed  ipratropium 500 mcg/2.5 mL inhalation solution: 2.5 milliliter(s) inhaled , As Needed  omeprazole 40 mg oral delayed release capsule: 1 cap(s) orally once a day  Remodulin 2.5 mg/mL injectable solution: 3 milliliter(s) subcutaneous once every 3 days  Vitamin C 500 mg oral tablet: 1 tab(s) orally once a day  Xarelto 10 mg oral tablet: 1 tab(s) orally once a day

## 2020-05-18 NOTE — H&P ADULT - PROBLEM SELECTOR PLAN 6
DVT ppx: SCDs. holding Eliquis prior to ablation  Diet: npo for procedure Pt with pedunculated skin lesion on L thigh, pt states it has been growing for past year.  - outpt f/u w/ derm vs plastics given size of lesion

## 2020-05-18 NOTE — H&P ADULT - PROBLEM SELECTOR PLAN 4
Samantha c/w atrovent Not on steroids at home or albuterol 2/2 tachycardia  - c/w atrovent nebulizer prn

## 2020-05-18 NOTE — H&P ADULT - PROBLEM SELECTOR PLAN 2
- holding xarelto prior to ablation Hx of PEs. Per pulmonary angio in the past, evidence of distal clots. Pt will require lifelong AC. Xarelto dose recently decreased 2/2 anemia  - holding xarelto prior to ablation. Hx of PEs. Per pulmonary angio in the past, evidence of distal clots. Pt will require lifelong AC per outpt notes. Xarelto dose recently decreased 2/2 anemia  - holding xarelto prior to ablation. Hx of PEs. Per pulmonary angio in the past, evidence of distal clots. Pt will require lifelong AC per outpt notes. Xarelto dose recently decreased 2/2 anemia  - holding xarelto prior to ablation. if no bleeding post-procedure, ot can be restarted tonight

## 2020-05-18 NOTE — PROGRESS NOTE ADULT - ATTENDING COMMENTS
She has moderate pulmonary hypertension and a history of pulmonary embolism. Exact etiology of PH is unclear at this time. She has recurrent SVT due to a likely AV shaista dependant mechanism as they terminate with adenosine. She does have short periods of atrial tachycardia by interrogation of her dual chamber pacemaker ([employer]). The exact indication for the pacing is unknown at this time.    We will proceed to ablation for SVT and plan to allow home after to continue Xarelto and her usual medications.

## 2020-05-18 NOTE — DISCHARGE NOTE PROVIDER - NSDCCAREPROVSEEN_GEN_ALL_CORE_FT
[facility] Cardiac Electrophysiology, Team [facility] Cardiac Electrophysiology, Team  [facility] Pulmonary, Team

## 2020-05-18 NOTE — DISCHARGE NOTE PROVIDER - CARE PROVIDERS DIRECT ADDRESSES
[email or web address]

## 2020-05-18 NOTE — H&P ADULT - PROBLEM SELECTOR PLAN 3
Pt with Grade I PAH. Last TTE 11/2019 w/ EF 55-60%; Normal LV, enlarged RA and RV with decreased RVSF. Estimated PASP 55 mm Hg c/w moderate pulmonary pressures.  - c/w remodulin (pt has home medication with her) Pt with Grade I PAH. Last TTE 11/2019 w/ EF 55-60%; Normal LV, enlarged RA and RV with decreased RVSF. Estimated PASP 55 mm Hg c/w moderate pulmonary pressures. Followed at [place] for lung transplant evaluation.   - c/w remodulin (pt has home medication with her) Pt with Grade I PAH. Last TTE 11/2019 w/ EF 55-60%; Normal LV, enlarged RA and RV with decreased RVSF. Estimated PASP 55 mm Hg c/w moderate pulmonary pressures. Currently being evaluated at [place] for lung transplant.   - c/w remodulin at 17 ng/kg/min (pt has home medication with her, has to exchange every 3 days)

## 2020-05-18 NOTE — H&P ADULT - PROBLEM SELECTOR PLAN 5
DVT ppx: SCDs. holding Eliquis prior to ablation  Diet: npo for procedure Pt with chronic iron deficiency anemia, unclear etiology. Recently established care with Heme to work up further. Also had recent dose decrease in Xarelto on account of the anemia. Pending outpt GI w/u per outpt Heme note but no current signs of GI bleed. Currently menstruating  - c/w Vit C and Fe supplementation  - monitor CBC  - c/w supplemental O2

## 2020-05-18 NOTE — H&P ADULT - HISTORY OF PRESENT ILLNESS
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 Pt states she experienced acute onset of chest pain, palpitations, and dyspnea 2-3 hrs prior to arrival. It started suddenly when patient was sitting down. The chest pain was a central sharp pressure, 8/10. Paramedics noted that the pt was in SVT. She received 6 mg of adenosine followed by 12mg with conversion to NSR. On arrival to the ED, pt was in sinus tachycardia. Pt had previously been on metoprolol but it was stopped in December. Pt was admitted to [facility] 2 weeks ago for management of SVT.    In ED:  VS: Temp 36.6,  -->now 80s, Bp 90/52, RR 18, SpO2 99% on RA. EKG  Labs: HST 9 -->10, covid negative  Imaging: CXR demonstrates PPM  Interventions:  cs Pt is a 38 yo F w/ PMHx Group I PAH on remodulin, complicated by RV systolic failure s/p PPM, on home O2 (2L NC at night), chronic PE (diagnosed 2017) on Xarelto, SVT, asthma presenting with palpitations and chest pain. Pt states she experienced acute onset of chest pain, palpitations, and dyspnea 2-3 hrs prior to arrival. It started suddenly when patient was sitting down. The chest pain was a central sharp pressure, 8/10. Paramedics noted that the pt was in SVT. She received 6 mg of adenosine followed by 12mg with conversion to NSR. On arrival to the ED, pt was in sinus tachycardia. Pt had previously been on metoprolol but it was stopped in December. Pt was admitted to [facility] 2 weeks ago for management of SVT and infected abdominal remodulin site s/p doxycycline.    In ED:  VS: Temp 36.6,  -->now 80s, Bp 90/52, RR 18, SpO2 99% on RA. EKG  Labs: HST 9 -->10, covid negative  Imaging: CXR demonstrates PPM  Interventions:  cs Pt is a 38 yo F w/ PMHx Group I PAH on remodulin, complicated by RV systolic failure s/p PPM, anemia on home O2 prn (2L NC at night), chronic PE (diagnosed 2017) on Xarelto, SVT, asthma presenting with  palpitations and chest pain. Pt states she experienced acute onset of chest pain, palpitations, and dyspnea 2-3 hrs prior to arrival. It started suddenly when patient was sitting down. The chest pain was a central sharp  pressure, 8/10. Paramedics noted that the pt was in SVT, so she received 6 mg of adenosine followed by 12mg with conversion to NSR. Pt states that the symptoms resolved when SVT broke. On arrival to the ED, pt  was in sinus tachycardia. Pt had previously been on metoprolol but it was stopped in December. Pt was admitted to [facility] 2 weeks ago for an infected abdominal remodulin site s/p doxycycline, now  resolved. Pt currently denies fevers, chills, CP, nausea, vomiting, diarrhea, LE edema, abdominal pain, dysuria. Pt currently has SOB with ambulation; pt is menstruating and states she usually develops SOUZA w/  worsening anemia.      In ED:  VS: Temp 36.6,  -->now 80s, Bp 90/52, RR 18, SpO2 99% on RA. EKG sinus tach to 119 w/ RAD  Labs: HST 9 -->10, covid negative  Imaging: CXR demonstrates PPM  Interventions:  cs Pt is a 38 yo F w/ PMHx Group I PAH on remodulin, complicated by RV systolic failure s/p PPM, anemia on home O2 prn (2L NC at night), chronic PE (diagnosed 2017) on Xarelto, SVT, asthma presenting with palpitations and chest pain. Pt states she experienced acute onset of chest pain, palpitations, and dyspnea 2-3 hrs prior to arrival. It started suddenly when patient was sitting down. The chest pain was a central sharp pressure, 8/10. Paramedics noted that the pt was in SVT, so she received 6 mg of adenosine followed by 12mg with conversion to NSR. Pt states that the symptoms resolved when SVT broke. On arrival to the ED, pt was in sinus tachycardia. Pt had previously been on metoprolol but it was stopped in December. Pt was admitted to [facility] 2 weeks ago for an infected abdominal remodulin site s/p doxycycline, now resolved. Pt currently denies fevers, chills, CP, nausea, vomiting, diarrhea, LE edema, abdominal pain, dysuria. Pt currently has SOB with ambulation; pt is menstruating and states she usually develops SOUZA w/ worsening anemia.      In ED:  VS: Temp 36.6,  -->now 80s, Bp 90/52, RR 18, SpO2 99% on RA. EKG sinus tach to 119 w/ RAD  Labs: HST 9 -->10, covid negative  Imaging: CXR demonstrates PPM  Interventions:  cs

## 2020-05-18 NOTE — H&P ADULT - PROBLEM SELECTOR PLAN 1
Pt presented with palpitations 2/2 SVT. Currently in NSR  - holding off on metoprolol since could limit diagnostic yield of EP study and could exacerbate chronic RV systolic dysfunction  - f/u EP recs  - plan for ablation today  - monitor on telemetry

## 2020-05-18 NOTE — DISCHARGE NOTE PROVIDER - NSDCFUSCHEDAPPT_GEN_ALL_CORE_FT
ROXI MARIA ; 06/09/2020 ; NPP Med Pulm [address] [facility] ROXI MARIA ; 06/09/2020 ; NPP Med Pulm [address] [facility] ROXI MARIA ; 06/09/2020 ; NPP Med Pulm [address] [facility] ROXI MARIA ; 06/09/2020 ; NPP Med Pulm [address] [facility] ROXI MARIA ; 06/09/2020 ; NPP Med Pulm [address] [facility]  ROXI MARIA ; 06/19/2020 ; NPP Cardio Electro 300 [address]

## 2020-05-18 NOTE — PRE-ANESTHESIA EVALUATION ADULT - NSANTHPMHFT_GEN_ALL_CORE
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 Pt states she experienced acute onset of chest pain, palpitations, and dyspnea 2-3 hrs prior to arrival. It started suddenly when patient was sitting down. The chest pain was a central sharp pressure, 8/10. Paramedics noted that the pt was in SVT. She received 6 mg of adenosine followed by 12mg with conversion to NSR. On arrival to the ED, pt was in sinus tachycardia. Pt had previously been on metoprolol but it was stopped in December. Pt was admitted to [facility] 2 weeks ago for management of SVT.    TTE:  Conclusions:  1. Normal left ventricular internal dimensions and wall  thicknesses.  2. Normal left ventricular systolic function. No segmental  wall motion abnormalities. Septal motion consistent with  right ventricular overload.  3. Right atrial enlargement.  4. Right ventricular enlargement with decreased right  ventricular systolic function.  5. Normal tricuspid valve. Mild tricuspid regurgitation.  6. Estimated pulmonary artery systolic pressure equals 55  mm Hg, assuming right atrial pressure equals 8 mm Hg,  consistent with moderate pulmonary pressures.  *** Compared with echocardiogram of 5/21/2018, estimated  PASP is reduced.

## 2020-05-18 NOTE — DISCHARGE NOTE NURSING/CASE MANAGEMENT/SOCIAL WORK - PATIENT PORTAL LINK FT
You can access the FollowMyHealth Patient Portal offered by [facility] by registering at the following website: [email or web address]. By joining [facility]’s FollowMyHealth portal, you will also be able to view your health information using other applications (apps) compatible with our system.

## 2020-05-18 NOTE — ED ADULT NURSE REASSESSMENT NOTE - NS ED NURSE REASSESS COMMENT FT1
report taken from Elodia BEGUM. states pt had good night with no complaints. Will continue to monitor.

## 2020-05-18 NOTE — PROGRESS NOTE ADULT - SUBJECTIVE AND OBJECTIVE BOX
24H hour events: Patient resting comfortably in bed in NSR.     MEDICATIONS:    tiotropium 18 MICROgram(s) Capsule 1 Capsule(s) Inhalation daily    pantoprazole    Tablet 40 milliGRAM(s) Oral before breakfast    sodium chloride 0.9% lock flush 3 milliLiter(s) IV Push every 8 hours      REVIEW OF SYSTEMS:  Complete 10point ROS negative.    PHYSICAL EXAM:  T(C): 36.6 (05-18-20 @ 10:18), Max: 37.1 (05-17-20 @ 14:56)  HR: 88 (05-18-20 @ 10:18) (88 - 114)  BP: 100/70 (05-18-20 @ 10:18) (87/67 - 122/74)  RR: 21 (05-18-20 @ 10:18) (16 - 21)  SpO2: 100% (05-18-20 @ 10:18) (96% - 100%)      Appearance: Normal	  Physical Exam per Primary Team     LABS:	 	    CBC Full  -  ( 17 May 2020 15:06 )  WBC Count : 9.70 K/uL  Hemoglobin : 11.5 g/dL  Hematocrit : 39.7 %  Platelet Count - Automated : 392 K/uL  Mean Cell Volume : 64.7 fl  Mean Cell Hemoglobin : 18.7 pg  Mean Cell Hemoglobin Concentration : 29.0 gm/dL  Auto Neutrophil # : 7.37 K/uL  Auto Lymphocyte # : 1.36 K/uL  Auto Monocyte # : 0.49 K/uL  Auto Eosinophil # : 0.10 K/uL  Auto Basophil # : 0.00 K/uL  Auto Neutrophil % : 76.0 %  Auto Lymphocyte % : 14.0 %  Auto Monocyte % : 5.0 %  Auto Eosinophil % : 1.0 %  Auto Basophil % : 0.0 %    05-17    140  |  106  |  9   ----------------------------<  106<H>  3.7   |  18<L>  |  0.90    Ca    8.4      17 May 2020 15:06  Phos  2.7     05-17  Mg     1.8     05-17    TPro  7.1  /  Alb  3.9  /  TBili  0.4  /  DBili  x   /  AST  18  /  ALT  12  /  AlkPhos  60  05-17        TELEMETRY: 	  NSR 90's

## 2020-05-18 NOTE — H&P ADULT - ATTENDING COMMENTS
Patient seen after ablation as was already in lab by time I went to see patient this AM.  Patient tolerated procedure well, cleared by EP for discharge.  Dr. Yoon saw patient in CRS as well, per patient to call him tomorrow. Continue current meds.   D/C planning with outpatient follow up with Dr. Schaffer and Dr. Yoon 33 minutes.

## 2020-05-18 NOTE — DISCHARGE NOTE PROVIDER - CARE PROVIDER_API CALL
Vviian Yoon  CRITICAL CARE MEDICINE  [address] [facility] [place] [address]  Phone: [phone]  Fax: [phone]  Follow Up Time:     Jarocho Schaffer)  Cardiac Electrophysiology; Cardiovascular Disease; Internal Medicine  [address] [place] [address]  Phone: [phone]  Fax: [phone]  Follow Up Time:

## 2020-05-18 NOTE — PROGRESS NOTE ADULT - SUBJECTIVE AND OBJECTIVE BOX
Pre-op Diagnosis:  SVT    Post-op Diagnosis:  AVNRT    Procedure:  Catheter ablation of AVNRT via modification of the slow pathway    Electrophysiologist:  NOELLE Schaffer MD    Assistant:  NATE Livingston MD    Anesthesia:  Procedural sedation    Access:  RFV, LFV    Description:  Successful catheter ablation of AVNRT    Complications:  None    EBL:  10 cc    Disposition:  Stable    Plan:  - Bedrest x 4 hrs  - May resume home dose of anticoagulation tonight  - Resume other home medications  - Likely discharge later today

## 2020-05-18 NOTE — ED CDU PROVIDER SUBSEQUENT DAY NOTE - PROGRESS NOTE DETAILS
CDU PROGRESS NOTE PA IRA: Pt resting comfortably, feeling well without complaint. NAD, VSS. No events on telemetry. Patient seen and evaluated by EP attending Dr. Schaffer with fellows at the bedside. Recommending admission for ablation and continued monitoring. Will discuss with Dr. Arroyo for admission and admit to hospitalist. -Judy Perez PA-C

## 2020-05-18 NOTE — DISCHARGE NOTE PROVIDER - HOSPITAL COURSE
Pt is a 38 yo F w/ PMHx Group I PAH on remodulin, complicated by RV systolic failure s/p PPM, anemia on home O2 prn (2L NC at night), chronic PE (diagnosed 2017) on Xarelto, SVT, asthma presenting with palpitations and chest pain. Pt states she experienced acute onset of chest pain, palpitations, and dyspnea 2-3 hrs prior to arrival. It started suddenly when patient was sitting down. The chest pain was a central sharp pressure, 8/10. Paramedics noted that the pt was in SVT, so she received 6 mg of adenosine followed by 12mg with conversion to NSR. Pt states that the symptoms resolved when SVT broke. On arrival to the ED, pt was in sinus tachycardia. Pt had previously been on metoprolol but it was stopped in December. Pt was admitted to [facility] 2 weeks ago for an infected abdominal remodulin site s/p doxycycline, now resolved. Pt currently denies fevers, chills, CP, nausea, vomiting, diarrhea, LE edema, abdominal pain, dysuria. Pt currently has SOB with ambulation; pt is menstruating and states she usually develops SOUZA w/ worsening anemia.          In ED:    VS: Temp 36.6,  -->now 80s, Bp 90/52, RR 18, SpO2 99% on RA. EKG sinus tach to 119 w/ RAD    Labs: HST 9 -->10, covid negative    Imaging: CXR demonstrates PPM    Interventions:  cs        s/p EPS and SVT ablation via bilateral groin access, site benign. VSS.     may discharge home today after 4 hours recovery and stable.

## 2020-05-18 NOTE — H&P ADULT - NSICDXPASTMEDICALHX_GEN_ALL_CORE_FT
PAST MEDICAL HISTORY:  Anemia On home O2 at 2L via NC    Asthma     Asthma with status asthmaticus, unspecified asthma severity     PE (pulmonary thromboembolism) on coumadin    Pulmonary embolism     Pulmonary hypertension     Pulmonary hypertension     Sinus tachycardia     Tachycardia

## 2020-05-18 NOTE — PROGRESS NOTE ADULT - ASSESSMENT
39 year old Female with WHO Group I PAH, with chronic RV systolic failure, s/p BSCI dual PPM (At [facility]), wears Home 02 nightly, Pulmonary Embolism 3 years ago, Asthma, Anemia,  presented to the ED with palpitations. 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     1. SVT Likely AVNRT    -NPO for EP Study/ possible ablation  -STAT type and screen   -Hold metoprolol  prior to EP Study, possible ablation   -s/p BSCI PPM interrogation by Fellow   -Continue to monitor on tele    2. History of PE 3 years ago, Hold Xarelto for now  -Can resume Xaelto tonight if patient is witout bleeding post procedure     Plan discussed with ER team        Oskar CARLIN [id number] 39 year old Female with WHO Group I PAH, with chronic RV systolic failure, s/p BSCI dual PPM (At [facility]), wears Home 02 nightly, Pulmonary Embolism 3 years ago, Asthma, Anemia,  presented to the ED with palpitations. 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     1. SVT Likely AVNRT    -NPO for EP Study/ possible ablation  -STAT type and screen   -Hold metoprolol  prior to EP Study, possible ablation   -s/p BSCI PPM interrogation by Fellow   -Continue to monitor on tele    2. History of PE 3 years ago, Hold Xarelto for now  -Can resume Xarelto tonight if patient is witout bleeding post procedure     Plan discussed with ER team        Oskar CARLIN [id number]

## 2020-05-18 NOTE — H&P ADULT - NSHPLABSRESULTS_GEN_ALL_CORE
11.5   9.70  )-----------( 392      ( 17 May 2020 15:06 )             39.7     05-17    140  |  106  |  9   ----------------------------<  106<H>  3.7   |  18<L>  |  0.90    Ca    8.4      17 May 2020 15:06  Phos  2.7     05-17  Mg     1.8     05-17    TPro  7.1  /  Alb  3.9  /  TBili  0.4  /  DBili  x   /  AST  18  /  ALT  12  /  AlkPhos  60  05-17        RADIOLOGY, EKG & ADDITIONAL TESTS: Reviewed.

## 2020-05-18 NOTE — ED CDU PROVIDER SUBSEQUENT DAY NOTE - HISTORY
38 YO F with WHO Group I PAH, with chronic RV systolic failure presented to the ED with palpitations. 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 Patient evaluated by EP, recommend hold off on metoprolol, hold Xarelto, monitor on tele, NPO for possible ablation tomorrow.

## 2020-05-18 NOTE — H&P ADULT - NSHPREVIEWOFSYSTEMS_GEN_ALL_CORE
CONSTITUTIONAL: No weakness, fatigue, malaise, fevers or chills, no weight change, appetite change  EYES: No visual changes; No double vision,  No vertigo, eye pain  Ears: no otalgia, no otorhea, no hearing loss, tinnitus  Nose: no epistaxis, rhinorrhea, post-discharge, sinus pressure  Throat: no throat pain, no oral lesions, tooth pain   NECK: No pain or stiffness  RESPIRATORY: No cough (productive or dry), wheezing, hemoptysis; No shortness of breath, orthnopnea, PND, SOUZA, snoring,  CARDIOVASCULAR: No chest pain or palpitations, no leg edema, no claudication    GASTROINTESTINAL: No abdominal or epigastric pain. No nausea, vomiting, or hematemesis; No diarrhea or constipation. No melena or hematochezia.  GENITOURINARY: No dysuria, frequency, urgency or hematuria, no pelvic pain, urinary incontinence, urgency  MSK: no joints or muscle pain, no swelling in joints or muscles  NEUROLOGICAL: No numbness or weakness, headache, memory loss, seizures, dizziness, vertigo, syncope, ataxia  SKIN: No pruritis, rashes, lesions or new moles  Psych: No anxiety, sadness, insomnia, suicide thoughts  Endocrine: No Heat or Cold intolerance, polydipsia, polyphagia  Heme/Lymph: no LN enlargement, no easy bruising or bleeding CONSTITUTIONAL: No weakness, fatigue, fevers or chills   Nose: no rhinorrhea  Throat: no throat pain   RESPIRATORY: chronic productive cough. no wheezing; No shortness of breath, PND, SOUZA   CARDIOVASCULAR: No chest pain or palpitations, no leg edema   GASTROINTESTINAL: No abdominal or epigastric pain. No nausea, vomiting. No diarrhea or constipation. No melena or hematochezia.  GENITOURINARY: No dysuria, frequency, urgency   MSK: no joints or muscle pain   NEUROLOGICAL: No numbness or weakness, headache  SKIN: No pruritis, rashes   Psych: No anxiety, sadness   Heme/Lymph: no easy bruising or bleeding

## 2020-05-18 NOTE — H&P ADULT - PROBLEM SELECTOR PLAN 7
DVT ppx: SCDs. holding Eliquis prior to ablation  Diet: npo for procedure DVT ppx: SCDs. holding Eliquis prior to ablation  Diet: npo for procedure    She Magana MD PGY2   spectra [id number]

## 2020-05-26 ENCOUNTER — NON-APPOINTMENT (OUTPATIENT)
Age: 40
End: 2020-05-26

## 2020-06-09 ENCOUNTER — APPOINTMENT (OUTPATIENT)
Dept: PULMONOLOGY | Facility: CLINIC | Age: 40
End: 2020-06-09
Payer: MEDICAID

## 2020-06-09 PROCEDURE — 99443: CPT

## 2020-06-09 NOTE — DISCUSSION/SUMMARY
[FreeTextEntry1] : -Assessment and Plan--------39 year-old lady with PULMONARY ARTERIAL HYPERTENSION S/P PPM FOR TACHYCARDIA AT [place] ON S/Q REMODULIN - ----S/P ABLATION MAY 18 2020 FOR TACHYCARDIA \par \par 1] PULM ART HTN ] --------- WHO GROUP I FUNCTIONAL CLASS III----[ AS PER DR FREEMAN WHO DID PULM ANGIO SHE HAS FEATURES OF GROUP I WITH SOME EVIDENCE OF DISTAL CLOTS] She is on remodulin 17NG/KG/MIN -----NEEDS repeat ECHO\par 2) ---She is now on xarelto---will be on lifelong anticoagulation-  BUT  DECREASED THE DOSE  FOR NOW AS SHE CONTINUES TO HAVE ANEMIA--encouraged use of oxygen-\par 3),HYPEREACTIVITY ----Z PACK , NEBULIZER, PREDNISONE \par 4) S/P PPM- follows EP clinic at [facility] --S/P   ABLATION FOR TACHYCARDIA AT [facility]  MAY 2020------NO MORE PALPITATIONS \par 5) referred to Dr Garcia- heart failure clinic\par 6) needs yearly CT chest and PFT\par 7) she has features of ERICA- including sleep disturbance, nocturnal desat, and excessive fatigue- will get HST to r/o ERICA\par 8) influenza vac given\par 9) has consult /[facility] Lung Transplant Team \par 10) ON DOXYCYCLINE FOR SITE INFECTRION\par 11]f/u in 1months\par \par  Patient at this time will follow the above mentioned recommendations ---If you have any questions I can be reached at # [phone]. \par \par Vivian Yoon MD, FCCP \par Director, Pulmonary Hypertension Program \par [facility] \par Division of Pulmonary, Critical Care and Sleep Medicine \par  Professor of Medicine \par [employer]\par

## 2020-06-09 NOTE — HISTORY OF PRESENT ILLNESS
[Medical Office: ([facility])___] : at the medical office located in  [TextBox_4] : [facility] telehealth visit conducted\par 39 year-old lady with PULMONARY ARTERIAL HYPERTENSION S/P PPM FOR TACHYCARDIA AT [place] ON S/Q REMODULIN - \par \par recently admitted to [facility] for infected remodulin site  ON 17 NG/KG/MIN\par ON RIVARAOXABAN AS WELL\par ----S/P ABLATION MAY 18 2020 FOR TACHYCARDIA \par \par   SHE IS ALSO BEING FOLLOWED AT [facility] FOR LUNG TRANSPLANT EVALUATION\par \par MAY 2020--RECENT ADMISSION TO [facility] FOR TACHYCARDIA AND  ABDOMEN  SITE INFECTION\par \par AFEBRILE ON DOXYCYCLINE FOR ABDOMEN SITE INFECTION---\par  BREATHING BACK TO BASELINE\par \par JUN9 2020---STARTED HAVING WHEEZING,  NEBULIZER , ON XARELTO

## 2020-06-19 ENCOUNTER — APPOINTMENT (OUTPATIENT)
Dept: ELECTROPHYSIOLOGY | Facility: CLINIC | Age: 40
End: 2020-06-19

## 2020-07-01 ENCOUNTER — OUTPATIENT (OUTPATIENT)
Dept: OUTPATIENT SERVICES | Facility: HOSPITAL | Age: 40
LOS: 1 days | End: 2020-07-01
Payer: MEDICAID

## 2020-07-01 DIAGNOSIS — Z95.0 PRESENCE OF CARDIAC PACEMAKER: Chronic | ICD-10-CM

## 2020-07-01 DIAGNOSIS — Z98.51 TUBAL LIGATION STATUS: Chronic | ICD-10-CM

## 2020-07-09 ENCOUNTER — NON-APPOINTMENT (OUTPATIENT)
Age: 40
End: 2020-07-09

## 2020-07-15 ENCOUNTER — APPOINTMENT (OUTPATIENT)
Dept: ELECTROPHYSIOLOGY | Facility: CLINIC | Age: 40
End: 2020-07-15

## 2020-07-20 ENCOUNTER — APPOINTMENT (OUTPATIENT)
Dept: PULMONOLOGY | Facility: CLINIC | Age: 40
End: 2020-07-20
Payer: MEDICAID

## 2020-07-20 PROCEDURE — 99215 OFFICE O/P EST HI 40 MIN: CPT | Mod: 95

## 2020-07-20 NOTE — HISTORY OF PRESENT ILLNESS
[Medical Office: ([facility])___] : at the medical office located in  [TextBox_4] : [facility] telehealth visit conducted\par 39 year-old lady with PULMONARY ARTERIAL HYPERTENSION S/P PPM FOR TACHYCARDIA AT [place] ON S/Q REMODULIN - \par \par recently admitted to [facility] for infected remodulin site  ON 17 NG/KG/MIN\par ON RIVARAOXABAN AS WELL\par ----S/P ABLATION MAY 18 2020 FOR TACHYCARDIA \par \par   SHE IS ALSO BEING FOLLOWED AT [facility] FOR LUNG TRANSPLANT EVALUATION\par \par MAY 2020--RECENT ADMISSION TO [facility] FOR TACHYCARDIA AND  ABDOMEN  SITE INFECTION\par \par AFEBRILE ON DOXYCYCLINE FOR ABDOMEN SITE INFECTION---\par  BREATHING BACK TO BASELINE\par \par JUNE 9 2020---STARTED HAVING WHEEZING,  NEBULIZER , ON XARELTO \par \par JULY 2020---On subcu Remodulin and Xarelto is compliant with nebulizer but now is complaining of increasing leg swelling and decreased exercise tolerance--- I have advised her that she be admitted for IV diuretic therapy\par \par

## 2020-07-20 NOTE — DISCUSSION/SUMMARY
[FreeTextEntry1] : -Assessment and Plan--------39 year-old lady with PULMONARY ARTERIAL HYPERTENSION S/P PPM FOR TACHYCARDIA AT [place] ON S/Q REMODULIN - ----S/P ABLATION MAY 18 2020 FOR TACHYCARDIA \par \par 1] PULM ART HTN ] --------- WHO GROUP I FUNCTIONAL CLASS III----[ AS PER DR FREEMAN WHO DID PULM ANGIO SHE HAS FEATURES OF GROUP I WITH SOME EVIDENCE OF DISTAL CLOTS] She is on remodulin 17NG/KG/MIN -----NEEDS repeat ECHO----we will have to increase her diuretics as she seems to be in fluid overload\par 2) ---She is now on xarelto---will be on lifelong anticoagulation-  BUT  DECREASED THE DOSE  FOR NOW AS SHE CONTINUES TO HAVE ANEMIA--encouraged use of oxygen-\par 3),HYPEREACTIVITY --continue NEBULIZER,\par 4) S/P PPM- follows EP clinic at [place] --S/P   ABLATION FOR TACHYCARDIA AT [facility]  MAY 2020------NO MORE PALPITATIONS \par 5) referred to Dr Garcia- heart failure clinic\par 6) needs yearly CT chest and PFT\par 7) she has features of ERICA- including sleep disturbance, nocturnal desat, and excessive fatigue- will get HST to r/o ERICA\par 8) influenza vac given\par 9) has consult /[place] Lung Transplant Team \par 10) ON DOXYCYCLINE FOR SITE INFECTRION\par 11]f/u in 1months\par \par  Patient at this time will follow the above mentioned recommendations ---If you have any questions I can be reached at # [phone]. \par \par Vivian Yoon MD, FCCP \par Director, Pulmonary Hypertension Program \par [facility] \par Division of Pulmonary, Critical Care and Sleep Medicine \par  Professor of Medicine \par [employer]\par

## 2020-07-21 ENCOUNTER — INPATIENT (INPATIENT)
Facility: HOSPITAL | Age: 40
LOS: 7 days | Discharge: HOME | DRG: 286 | End: 2020-07-29
Attending: HOSPITALIST | Admitting: HOSPITALIST
Payer: MEDICAID

## 2020-07-21 VITALS
RESPIRATION RATE: 20 BRPM | WEIGHT: 190.04 LBS | SYSTOLIC BLOOD PRESSURE: 139 MMHG | OXYGEN SATURATION: 98 % | HEART RATE: 107 BPM | HEIGHT: 67 IN | DIASTOLIC BLOOD PRESSURE: 83 MMHG | TEMPERATURE: 98 F

## 2020-07-21 DIAGNOSIS — D64.9 ANEMIA, UNSPECIFIED: ICD-10-CM

## 2020-07-21 DIAGNOSIS — I27.20 PULMONARY HYPERTENSION, UNSPECIFIED: ICD-10-CM

## 2020-07-21 DIAGNOSIS — Z29.9 ENCOUNTER FOR PROPHYLACTIC MEASURES, UNSPECIFIED: ICD-10-CM

## 2020-07-21 DIAGNOSIS — Z95.0 PRESENCE OF CARDIAC PACEMAKER: Chronic | ICD-10-CM

## 2020-07-21 DIAGNOSIS — Z02.9 ENCOUNTER FOR ADMINISTRATIVE EXAMINATIONS, UNSPECIFIED: ICD-10-CM

## 2020-07-21 DIAGNOSIS — I26.99 OTHER PULMONARY EMBOLISM WITHOUT ACUTE COR PULMONALE: ICD-10-CM

## 2020-07-21 DIAGNOSIS — H18.9 UNSPECIFIED DISORDER OF CORNEA: ICD-10-CM

## 2020-07-21 DIAGNOSIS — I27.21 SECONDARY PULMONARY ARTERIAL HYPERTENSION: ICD-10-CM

## 2020-07-21 DIAGNOSIS — J45.909 UNSPECIFIED ASTHMA, UNCOMPLICATED: ICD-10-CM

## 2020-07-21 DIAGNOSIS — J32.9 CHRONIC SINUSITIS, UNSPECIFIED: ICD-10-CM

## 2020-07-21 DIAGNOSIS — Z98.51 TUBAL LIGATION STATUS: Chronic | ICD-10-CM

## 2020-07-21 DIAGNOSIS — I27.29 OTHER SECONDARY PULMONARY HYPERTENSION: ICD-10-CM

## 2020-07-21 LAB
ALBUMIN SERPL ELPH-MCNC: 4.3 G/DL — SIGNIFICANT CHANGE UP (ref 3.3–5)
ALP SERPL-CCNC: 56 U/L — SIGNIFICANT CHANGE UP (ref 40–120)
ALT FLD-CCNC: 8 U/L — LOW (ref 10–45)
ANION GAP SERPL CALC-SCNC: 15 MMOL/L — SIGNIFICANT CHANGE UP (ref 5–17)
APTT BLD: 40.6 SEC — HIGH (ref 27.5–35.5)
AST SERPL-CCNC: 19 U/L — SIGNIFICANT CHANGE UP (ref 10–40)
BASE EXCESS BLDV CALC-SCNC: -0.9 MMOL/L — SIGNIFICANT CHANGE UP (ref -2–2)
BILIRUB SERPL-MCNC: 0.6 MG/DL — SIGNIFICANT CHANGE UP (ref 0.2–1.2)
BUN SERPL-MCNC: 17 MG/DL — SIGNIFICANT CHANGE UP (ref 7–23)
CA-I SERPL-SCNC: 1.15 MMOL/L — SIGNIFICANT CHANGE UP (ref 1.12–1.3)
CALCIUM SERPL-MCNC: 9 MG/DL — SIGNIFICANT CHANGE UP (ref 8.4–10.5)
CHLORIDE BLDV-SCNC: 105 MMOL/L — SIGNIFICANT CHANGE UP (ref 96–108)
CHLORIDE SERPL-SCNC: 106 MMOL/L — SIGNIFICANT CHANGE UP (ref 96–108)
CO2 BLDV-SCNC: 24 MMOL/L — SIGNIFICANT CHANGE UP (ref 22–30)
CO2 SERPL-SCNC: 15 MMOL/L — LOW (ref 22–31)
CREAT SERPL-MCNC: 1.07 MG/DL — SIGNIFICANT CHANGE UP (ref 0.5–1.3)
GAS PNL BLDV: 140 MMOL/L — SIGNIFICANT CHANGE UP (ref 135–145)
GAS PNL BLDV: SIGNIFICANT CHANGE UP
GLUCOSE BLDV-MCNC: 99 MG/DL — SIGNIFICANT CHANGE UP (ref 70–99)
GLUCOSE SERPL-MCNC: 86 MG/DL — SIGNIFICANT CHANGE UP (ref 70–99)
HCG SERPL-ACNC: <2 MIU/ML — SIGNIFICANT CHANGE UP
HCO3 BLDV-SCNC: 23 MMOL/L — SIGNIFICANT CHANGE UP (ref 21–29)
HCT VFR BLD CALC: 38.7 % — SIGNIFICANT CHANGE UP (ref 34.5–45)
HCT VFR BLDA CALC: 41 % — SIGNIFICANT CHANGE UP (ref 39–50)
HGB BLD CALC-MCNC: 13.5 G/DL — SIGNIFICANT CHANGE UP (ref 11.5–15.5)
HGB BLD-MCNC: 11.1 G/DL — LOW (ref 11.5–15.5)
HIV 1 & 2 AB SERPL IA.RAPID: SIGNIFICANT CHANGE UP
INR BLD: 1.04 RATIO — SIGNIFICANT CHANGE UP (ref 0.88–1.16)
LACTATE BLDV-MCNC: 1.4 MMOL/L — SIGNIFICANT CHANGE UP (ref 0.7–2)
MCHC RBC-ENTMCNC: 18 PG — LOW (ref 27–34)
MCHC RBC-ENTMCNC: 28.7 GM/DL — LOW (ref 32–36)
MCV RBC AUTO: 62.9 FL — LOW (ref 80–100)
NRBC # BLD: 0 /100 WBCS — SIGNIFICANT CHANGE UP (ref 0–0)
NT-PROBNP SERPL-SCNC: 847 PG/ML — HIGH (ref 0–300)
OTHER CELLS CSF MANUAL: 4 ML/DL — LOW (ref 18–22)
PCO2 BLDV: 38 MMHG — SIGNIFICANT CHANGE UP (ref 35–50)
PH BLDV: 7.4 — SIGNIFICANT CHANGE UP (ref 7.35–7.45)
PLATELET # BLD AUTO: 428 K/UL — HIGH (ref 150–400)
PO2 BLDV: 20 MMHG — LOW (ref 25–45)
POTASSIUM BLDV-SCNC: 3.6 MMOL/L — SIGNIFICANT CHANGE UP (ref 3.5–5.3)
POTASSIUM SERPL-MCNC: 4.2 MMOL/L — SIGNIFICANT CHANGE UP (ref 3.5–5.3)
POTASSIUM SERPL-SCNC: 4.2 MMOL/L — SIGNIFICANT CHANGE UP (ref 3.5–5.3)
PROT SERPL-MCNC: 7.2 G/DL — SIGNIFICANT CHANGE UP (ref 6–8.3)
PROTHROM AB SERPL-ACNC: 12.4 SEC — SIGNIFICANT CHANGE UP (ref 10.6–13.6)
RBC # BLD: 6.15 M/UL — HIGH (ref 3.8–5.2)
RBC # FLD: 19.9 % — HIGH (ref 10.3–14.5)
SAO2 % BLDV: 19 % — LOW (ref 67–88)
SARS-COV-2 RNA SPEC QL NAA+PROBE: SIGNIFICANT CHANGE UP
SODIUM SERPL-SCNC: 136 MMOL/L — SIGNIFICANT CHANGE UP (ref 135–145)
TROPONIN T, HIGH SENSITIVITY RESULT: 7 NG/L — SIGNIFICANT CHANGE UP (ref 0–51)
TROPONIN T, HIGH SENSITIVITY RESULT: <6 NG/L — SIGNIFICANT CHANGE UP (ref 0–51)
WBC # BLD: 9.91 K/UL — SIGNIFICANT CHANGE UP (ref 3.8–10.5)
WBC # FLD AUTO: 9.91 K/UL — SIGNIFICANT CHANGE UP (ref 3.8–10.5)

## 2020-07-21 PROCEDURE — 99223 1ST HOSP IP/OBS HIGH 75: CPT | Mod: GC

## 2020-07-21 PROCEDURE — 93308 TTE F-UP OR LMTD: CPT | Mod: 26

## 2020-07-21 PROCEDURE — 99285 EMERGENCY DEPT VISIT HI MDM: CPT

## 2020-07-21 PROCEDURE — 93306 TTE W/DOPPLER COMPLETE: CPT | Mod: 26

## 2020-07-21 PROCEDURE — 71046 X-RAY EXAM CHEST 2 VIEWS: CPT | Mod: 26

## 2020-07-21 PROCEDURE — 99231 SBSQ HOSP IP/OBS SF/LOW 25: CPT

## 2020-07-21 PROCEDURE — 93010 ELECTROCARDIOGRAM REPORT: CPT

## 2020-07-21 PROCEDURE — 71275 CT ANGIOGRAPHY CHEST: CPT | Mod: 26

## 2020-07-21 RX ORDER — RIVAROXABAN 15 MG-20MG
10 KIT ORAL
Refills: 0 | Status: DISCONTINUED | OUTPATIENT
Start: 2020-07-21 | End: 2020-07-22

## 2020-07-21 RX ORDER — FUROSEMIDE 40 MG
40 TABLET ORAL
Refills: 0 | Status: DISCONTINUED | OUTPATIENT
Start: 2020-07-21 | End: 2020-07-23

## 2020-07-21 RX ORDER — IPRATROPIUM BROMIDE 0.2 MG/ML
500 SOLUTION, NON-ORAL INHALATION ONCE
Refills: 0 | Status: COMPLETED | OUTPATIENT
Start: 2020-07-21 | End: 2020-07-21

## 2020-07-21 RX ORDER — IPRATROPIUM BROMIDE 0.2 MG/ML
500 SOLUTION, NON-ORAL INHALATION EVERY 6 HOURS
Refills: 0 | Status: DISCONTINUED | OUTPATIENT
Start: 2020-07-21 | End: 2020-07-29

## 2020-07-21 RX ORDER — ALBUTEROL 90 UG/1
2.5 AEROSOL, METERED ORAL ONCE
Refills: 0 | Status: DISCONTINUED | OUTPATIENT
Start: 2020-07-21 | End: 2020-07-21

## 2020-07-21 RX ORDER — ALBUTEROL 90 UG/1
1 AEROSOL, METERED ORAL EVERY 4 HOURS
Refills: 0 | Status: DISCONTINUED | OUTPATIENT
Start: 2020-07-21 | End: 2020-07-22

## 2020-07-21 RX ORDER — ASCORBIC ACID 60 MG
1 TABLET,CHEWABLE ORAL
Qty: 0 | Refills: 0 | DISCHARGE

## 2020-07-21 RX ORDER — ALBUTEROL 90 UG/1
1 AEROSOL, METERED ORAL ONCE
Refills: 0 | Status: DISCONTINUED | OUTPATIENT
Start: 2020-07-21 | End: 2020-07-21

## 2020-07-21 RX ORDER — FERROUS SULFATE 325(65) MG
1 TABLET ORAL
Qty: 0 | Refills: 0 | DISCHARGE

## 2020-07-21 RX ORDER — FUROSEMIDE 40 MG
40 TABLET ORAL ONCE
Refills: 0 | Status: COMPLETED | OUTPATIENT
Start: 2020-07-21 | End: 2020-07-21

## 2020-07-21 RX ORDER — TIOTROPIUM BROMIDE 18 UG/1
1 CAPSULE ORAL; RESPIRATORY (INHALATION) DAILY
Refills: 0 | Status: DISCONTINUED | OUTPATIENT
Start: 2020-07-21 | End: 2020-07-29

## 2020-07-21 RX ORDER — IPRATROPIUM/ALBUTEROL SULFATE 18-103MCG
3 AEROSOL WITH ADAPTER (GRAM) INHALATION EVERY 6 HOURS
Refills: 0 | Status: DISCONTINUED | OUTPATIENT
Start: 2020-07-21 | End: 2020-07-21

## 2020-07-21 RX ORDER — PANTOPRAZOLE SODIUM 20 MG/1
40 TABLET, DELAYED RELEASE ORAL
Refills: 0 | Status: DISCONTINUED | OUTPATIENT
Start: 2020-07-21 | End: 2020-07-29

## 2020-07-21 RX ADMIN — Medication 500 MICROGRAM(S): at 13:48

## 2020-07-21 RX ADMIN — RIVAROXABAN 10 MILLIGRAM(S): KIT at 21:43

## 2020-07-21 RX ADMIN — Medication 40 MILLIGRAM(S): at 18:50

## 2020-07-21 RX ADMIN — Medication 40 MILLIGRAM(S): at 15:35

## 2020-07-21 RX ADMIN — Medication 500 MICROGRAM(S): at 21:43

## 2020-07-21 NOTE — CONSULT NOTE ADULT - ATTENDING COMMENTS
59 yo F PMH WHO class I pHTN (currently on remodulin, previously on riociguat, PAP 88/31), PE (on xarelto), s/p PPM (Faulk, 2016), AVRNT s/p ablation, asthma, and nocturnal hypoxia on intermittent 2LNC, recent site infection presenting  for worsening SOB. Pt reports her device has been functioning normally. Pt subjectively feels she is volume overloaded w/ edema and abd distension. Did note that she feels more asthmatic episodes over the past week and using atrovent neb 5 xday. May not have been on steroids recently because she ran out? Concern for worsening RVF w/o clear precipitating factor. Cont diuresis w/ monitoring of hemodynamic status. Cont remodulin. Cont baseline pred. Cont atrovent neb. Check official echo.

## 2020-07-21 NOTE — ED ADULT NURSE REASSESSMENT NOTE - NS ED NURSE REASSESS COMMENT FT1
Pt stable, no signs of acute distress noted, blood coagulated on lab, pt is having US to place another IV and collect blood. Pt tolerating well. Will continue to monitor closely.

## 2020-07-21 NOTE — ED PROVIDER NOTE - CLINICAL SUMMARY MEDICAL DECISION MAKING FREE TEXT BOX
BETTY Nicole MD: Pt is a 39 y/o female with PMHx pulmonary HTN w/ Class I/IV features (dx in 2014) on continuous Remodulin infusions c/b RV systolic failure s/p PPM , chronic asthma on Atrovent and 2L nasal cannula at night, chronic PE on Xarelto 10mg, GERD, SVT s/p ablation (5/2020) who p/w SOB and orthopnea and cough with white sputum x 1 mo, worsening. EKG shows new TWI in II, III, aVF since May 2020. Ddx includes, however, is not limited to: CHF exacerbation, ACS, PNA, pulmonary d/o, other. Less likely PE as pt is already on AC. Plan: trop, bnp, CXR, basic labs, covid testing, Cards/Pulm consults, likely TBA

## 2020-07-21 NOTE — H&P ADULT - PROBLEM SELECTOR PLAN 2
Pt c/o SOB for 1 month possibly 2/2 fluid overload in the setting of recent hospitalization w/fluids given. Pt at baseline HR and BP but given SOB and lightheadedness on exertion, TTE with RV enlargement and LV dysfunction possibly 2/2 to RHF. Elevated BNP with troponins 6<-7<-10<-9.   Plan - Pulm and Cardio recs appreciated greatly  - initial 40mg Lasix IV in ED;  - STRICT Is and Os with goal net negative 500 to 1000cc daily  close hemodynamic monitoring  - avoid excess IV fluids, may worsen RV dilation and further compromise LV function/cardiac output  - VBG for evaluation of end organ hypoperfusion  - re-initiate 10mg prednisone daily, no signs of acute asthma exacerbation  - prn ipratropium  - okay to defer albuterol iso tachycardia  - avoid IV beta blocker  - Close f/u renal function in setting of dye load for CT.

## 2020-07-21 NOTE — ED ADULT NURSE NOTE - OBJECTIVE STATEMENT
41 yo female complaining of shortness of breath for 5 days, pt with extensive cardiac hx, called her MD who told her to come to the hospital. Pt with the constant infusion pulmonary device, denies chest pain, nausea and vomiting at this moment. Pt alerted and oriented x3, socializing on the phone, no complains of pain at this moment. Heart sounds normal, lungs clear, no edema noted at this moment. IV placed on right hand 20 G. Blood drawn, sent to lab. Pt placed on continuous monitoring. Will continue to monitor closely.

## 2020-07-21 NOTE — H&P ADULT - ASSESSMENT
Pt is a 41 y/o female with PMHx pulmonary HTN w/ Class I/IV features (dx in 2014) on continuous Remodulin infusions c/b RV systolic failure s/p PPM, chronic asthma on Atrovent and 2L nasal cannula at night, chronic PE on Xarelto 10mg, GERD, SVT s/p ablation (5/2020), chronic sinusitis, unspecified keratoconus who p/w worsening SOB of 1 month duration. Pt is being admitted to floors for acute-on-chronic RHF and PAH with plan for careful diuresis and continued monitoring of fluid status, currently hemodynamically stable.

## 2020-07-21 NOTE — H&P ADULT - NSICDXPASTMEDICALHX_GEN_ALL_CORE_FT
PAST MEDICAL HISTORY:  Anemia     Asthma On home O2 nightly at 2L via NC    Keratoconus     PE (pulmonary thromboembolism) on Xarelto 10 mg daily    Pulmonary embolism     Pulmonary hypertension     Sinusitis     Tachycardia PAST MEDICAL HISTORY:  Anemia     Asthma On home O2 nightly at 2L via NC    Corneal disorder     PE (pulmonary thromboembolism) on Xarelto 10 mg daily    Pulmonary embolism     Pulmonary hypertension     Right heart failure     Sinusitis     Tachycardia

## 2020-07-21 NOTE — CONSULT NOTE ADULT - ASSESSMENT
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    Pertinent Studies:  RHC 5/4/2018: HR 88 bpm, RAP 4, PAP 88/31 (51), CWP 6, PA sat 61% for CO/CI 5.7/28, PVR 8.6 MONACO  TTE 11/2019: normal LV systolic function. TAJ, RV enlargement with decreased RV systolic function. Mild TR. 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 Presentation possibly due to RV volume overload, will trial diuresis and reassess improvement of symptoms.    Pertinent Studies:  RHC 5/4/2018: HR 88 bpm, RAP 4, PAP 88/31 (51), CWP 6, PA sat 61% for CO/CI 5.7/28, PVR 8.6 MONACO  TTE 11/2019: normal LV systolic function. TAJ, RV enlargement with decreased RV systolic function. Mild TR. PaSP approximately 55

## 2020-07-21 NOTE — CONSULT NOTE ADULT - SUBJECTIVE AND OBJECTIVE BOX
HPI:  61 yo F PMH WHO class I pHTN w/ history of PE (previously on riociguat, currently on remodulin), PE on half dose xarelto iso anemia, bradycardia s/p PPM ([place], 2016), AVRNT s/p ablation (5/2020), asthma, and nocturnal hypoxia on intermittent 2LNC presenting from outpatient pulmonologist for worsening SOB x1 month.     Of note, pt recently hospitalized at [facility] in May for abdominal remodulin site infection. Reports being managed with IV fluids and abx. States that since hospitalization has been experiencing progressively worsening shortness of breath. Symptoms have significantly worsened recently over past few weeks with decreased ET, nocturnal dyspnea, abdominal swelling, LE edema, and exertional lightheadedness. Also reports increased frequency of asthma exacerbations (shortness of breath, wheezing) requiring atrovent ~5x daily this past week. States that at baseline she experiences palpitations, but decreased in frequency over past few weeks. Evaluated by Dr. Yoon via televisit and recommended for ED evaluation.     Vitals in ED notable for normotension, baseline tachycardia to 100s (pt states ranges from 100-120s at home), and saturations of 100% on RA. Labs w/ negative troponin and mildly elevated proBNP to 847. COVID negative. CTPE obtained w/ e/o significant RV enlargement and decreased LV size. Pulmonary consulted for assistance with SOB and pHTN management.     PAST MEDICAL & SURGICAL HISTORY:  PE (pulmonary thromboembolism): on coumadin  Asthma with status asthmaticus, unspecified asthma severity  Sinus tachycardia  Asthma  Pulmonary embolism  Pulmonary hypertension  Anemia: On home O2 at 2L via NC  Tachycardia  Pulmonary hypertension  Pacemaker  History of tubal ligation      FAMILY HISTORY:  Family history of diabetes mellitus in mother  Family history of diabetes mellitus      SOCIAL HISTORY:  Smoking: [x] Never Smoked [ ] Former Smoker (__ packs x ___ years) [ ] Current Smoker  (__ packs x ___ years)  Substance Use: [ ] Never Used [ ] Used ____  EtOH Use:  Marital Status: [ ] Single [ ]  [ ]  [ ]   Sexual History:   Occupation:  Recent Travel:  Country of Birth:  Advance Directives:    Allergies    penicillin (Unknown)  vancomycin (Unknown)    Intolerances        HOME MEDICATIONS:  Home Medications:  ferrous sulfate 325 mg (65 mg elemental iron) oral tablet: 1 tab(s) orally 2 times a day (21 Jul 2020 16:22)  ipratropium 500 mcg/2.5 mL inhalation solution: 2.5 milliliter(s) inhaled , As Needed (21 Jul 2020 16:22)  omeprazole 40 mg oral delayed release capsule: 1 cap(s) orally once a day (21 Jul 2020 16:22)  Remodulin 2.5 mg/mL injectable solution: 3 milliliter(s) subcutaneous once every 3 days    **NOTE: Medication picked up from specialty pharmacy** (21 Jul 2020 16:22)  Xarelto 10 mg oral tablet: 1 tab(s) orally once a day (21 Jul 2020 16:22)      REVIEW OF SYSTEMS:  Constitutional: [x] negative [ ] fevers [ ] chills [ ] weight loss [ ] weight gain  HEENT: [ ] negative [ ] dry eyes [ ] eye irritation [ ] postnasal drip [ ] nasal congestion  CV: [x] negative  [ ] chest pain [ ] orthopnea [ ] palpitations [ ] murmur  Resp: [ ] negative [ ] cough [x] shortness of breath [x] dyspnea [ ] wheezing [ ] sputum [ ] hemoptysis  GI: [ ] negative [ ] nausea [ ] vomiting [ ] diarrhea [ ] constipation [ ] abd pain [ ] dysphagia   : [ ] negative [ ] dysuria [ ] nocturia [ ] hematuria [ ] increased urinary frequency  Musculoskeletal: [ ] negative [ ] back pain [ ] myalgias [ ] arthralgias [ ] fracture  Skin: [ ] negative [ ] rash [ ] itch  Neurological: [ ] negative [ ] headache [ ] dizziness [ ] syncope [ ] weakness [ ] numbness  Psychiatric: [ ] negative [ ] anxiety [ ] depression  Endocrine: [ ] negative [ ] diabetes [ ] thyroid problem  Hematologic/Lymphatic: [ ] negative [ ] anemia [ ] bleeding problem  Allergic/Immunologic: [ ] negative [ ] itchy eyes [ ] nasal discharge [ ] hives [ ] angioedema  [x] All other systems negative  [ ] Unable to assess ROS because ________    OBJECTIVE:  ICU Vital Signs Last 24 Hrs  T(C): 36.8 (21 Jul 2020 10:48), Max: 36.9 (21 Jul 2020 09:51)  T(F): 98.2 (21 Jul 2020 10:48), Max: 98.5 (21 Jul 2020 09:51)  HR: 113 (21 Jul 2020 16:41) (100 - 113)  BP: 126/86 (21 Jul 2020 16:41) (126/86 - 139/83)  BP(mean): --  ABP: --  ABP(mean): --  RR: 20 (21 Jul 2020 16:41) (20 - 20)  SpO2: 98% (21 Jul 2020 16:41) (98% - 98%)        CAPILLARY BLOOD GLUCOSE          PHYSICAL EXAM:  GEN: Middle aged female, NAD  HEENT: EOMI, MMM  CV: Tachycardic w/o mrg  PULM: CTAB  ABD: Soft, NT  EXT: WWP, trace LE edema bilaterally    HOSPITAL MEDICATIONS:  Standing Meds:  furosemide   Injectable 40 milliGRAM(s) IV Push two times a day  Remodulin (Treprostinil) vial 50 milliGRAM(s),IV Solution 20 milliLiter(s) 50 milliGRAM(s) IV Continuous <Continuous>  rivaroxaban 10 milliGRAM(s) Oral with dinner      PRN Meds:      LABS:                        11.1   9.91  )-----------( 428      ( 21 Jul 2020 10:55 )             38.7     Hgb Trend: 11.1<--  07-21    136  |  106  |  17  ----------------------------<  86  4.2   |  15<L>  |  1.07    Ca    9.0      21 Jul 2020 10:55    TPro  7.2  /  Alb  4.3  /  TBili  0.6  /  DBili  x   /  AST  19  /  ALT  8<L>  /  AlkPhos  56  07-21    Creatinine Trend: 1.07<--  PT/INR - ( 21 Jul 2020 10:55 )   PT: 12.4 sec;   INR: 1.04 ratio         PTT - ( 21 Jul 2020 10:55 )  PTT:40.6 sec          MICROBIOLOGY:       RADIOLOGY:  [ ] Reviewed and interpreted by me    PULMONARY FUNCTION TESTS:    EKG:

## 2020-07-21 NOTE — ED PROVIDER NOTE - PROGRESS NOTE DETAILS
Valdo, PGY2: cardiology fellow contacted regarding new TWI in inferior leads a/w SOB, worse past few days with exertion. Agree w/ plan will see pt in ED. Valdo, PGY2: spoke w pt pulmonologist who recommends iv diuretics, 40mg lasix and tba hospitalist tele.

## 2020-07-21 NOTE — H&P ADULT - PROBLEM SELECTOR PLAN 5
Family hx of anemia, currently at baseline, Hg   Plan: Monitor CBC for sudden decrease from baseline, Hg11 on admission.

## 2020-07-21 NOTE — CONSULT NOTE ADULT - SUBJECTIVE AND OBJECTIVE BOX
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    Pt was diagnosed with pulmonary hypertension 6 years ago. She was diagnosed with PAH WHO group 1 with chronic RV systolic dysfunction. She was found to have e/o distal pulmonary emboli and had previously been on treatment with riociquat. She has been evaluated at [facility] for transplant where she was started on IV treprostinil (primary complaint is jaw pain as side effect). She was last seen by Dr. Garcia 1/2020 with poor functional capacity and has NYHA Class III symptoms. Via televisit 4/2020, her treprostinil was uptitrated from 16 ng/kg/min to 17.     She was seen by Dr. Yoon yesterday, noted worsening SOB, orthopnea x 2 months so she was told to come to ED.     PMHx:   PE (pulmonary thromboembolism)  Asthma with status asthmaticus, unspecified asthma severity  Sinus tachycardia  Asthma  Pulmonary embolism  Pulmonary hypertension  Anemia  Tachycardia  Pulmonary hypertension  Asthma      PSHx:   Pacemaker  No significant past surgical history  History of tubal ligation      Allergies:  penicillin (Unknown)  vancomycin (Unknown)      Home Meds:  Remodulin 17ng/kg/min  Xarelto 10 daily (reduced dose due to anemia)  Toprol 25 XL daily  Atrovent/albuterol nebs    FAMILY HISTORY:  Family history of diabetes mellitus in mother  Family history of diabetes mellitus      Social History:  Smoking History:  Alcohol Use:  Drug Use:    REVIEW OF SYSTEMS:  CONSTITUTIONAL: No weakness, fevers or chills  EYES/ENT: No visual changes;  No dysphagia  NECK: No pain or stiffness  RESPIRATORY: No cough, wheezing, hemoptysis; No shortness of breath  CARDIOVASCULAR: No chest pain or palpitations; No lower extremity edema  GASTROINTESTINAL: No abdominal or epigastric pain. No nausea, vomiting, or hematemesis; No diarrhea or constipation. No melena or hematochezia.  BACK: No back pain  GENITOURINARY: No dysuria, frequency or hematuria  NEUROLOGICAL: No numbness or weakness  SKIN: No itching, burning, rashes, or lesions   All other review of systems is negative unless indicated above.    Physical Exam:  T(F): 98.2 (07-21), Max: 98.5 (07-21)  HR: 100 (07-21) (100 - 107)  BP: 135/81 (07-21) (135/81 - 139/83)  RR: 20 (07-21)  SpO2: 98% (07-21)  GENERAL: No acute distress, well-developed  HEAD:  Atraumatic, Normocephalic  ENT: EOMI, PERRLA, conjunctiva and sclera clear, Neck supple, No JVD, moist mucosa  CHEST/LUNG: Clear to auscultation bilaterally; No wheeze, equal breath sounds bilaterally   BACK: No spinal tenderness  HEART: Regular rate and rhythm; No murmurs, rubs, or gallops  ABDOMEN: Soft, Nontender, Nondistended; Bowel sounds present  EXTREMITIES:  No clubbing, cyanosis, or edema  PSYCH: Nl behavior, nl affect  NEUROLOGY: AAOx3, non-focal, cranial nerves intact  SKIN: Normal color, No rashes or lesions  LINES:    Labs:  reviewed                        11.1   9.91  )-----------( 428      ( 21 Jul 2020 10:55 )             38.7     07-21    136  |  106  |  17  ----------------------------<  86  4.2   |  15<L>  |  1.07    Ca    9.0      21 Jul 2020 10:55    TPro  7.2  /  Alb  4.3  /  TBili  0.6  /  DBili  x   /  AST  19  /  ALT  8<L>  /  AlkPhos  56  07-21    PT/INR - ( 21 Jul 2020 10:55 )   PT: 12.4 sec;   INR: 1.04 ratio         PTT - ( 21 Jul 2020 10:55 )  PTT:40.6 sec    CARDIAC MARKERS ( 21 Jul 2020 12:21 )  <6 ng/L / x     / x     / x     / x     / x      CARDIAC MARKERS ( 21 Jul 2020 10:55 )  7 ng/L / x     / x     / x     / x     / x            Serum Pro-Brain Natriuretic Peptide: 847 pg/mL (07-21 @ 10:55)      Cardiovascular Diagnostic Testing:    Echo: ------------------------------------------------------------------------  Conclusions:  1. Normal left ventricular internal dimensions and wall  thicknesses.  2. Normal left ventricular systolic function. No segmental  wall motion abnormalities. Septal motion consistent with  right ventricular overload.  3. Right atrial enlargement.  4. Right ventricular enlargement with decreased right  ventricular systolic function.  5. Normal tricuspidvalve. Mild tricuspid regurgitation.  6. Estimated pulmonary artery systolic pressure equals 55  mm Hg, assuming right atrial pressure equals 8 mm Hg,  consistent with moderate pulmonary pressures.  *** Compared with echocardiogram of 5/21/2018, estimated  PASP is reduced.      Imaging:    CXR:  FINDINGS:  The cardiac silhouette is normal in size. There are no focal consolidations or pleural effusions. There is prominence of the pulmonary artery, unchanged. The osseous structures are intact.    IMPRESSION: Clear lungs. 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    Pt was diagnosed with pulmonary hypertension 6 years ago. She was diagnosed with PAH WHO group 1 with chronic RV systolic dysfunction. She was found to have e/o distal pulmonary emboli and had previously been on treatment with riociquat. She has been evaluated at [facility] for transplant where she was started on IV treprostinil (primary complaint is jaw pain as side effect). She was last seen by Dr. Garcia 1/2020 with poor functional capacity and has NYHA Class III symptoms. Via televisit 4/2020, her treprostinil was uptitrated from 16 ng/kg/min to 17.     She reports doing well since last televisit with Dr. Garcia. Her baseline exercise tolerance is approximately 1/2 block to 1 block. She was last seen by Dr. Yoon yesterday. She reported exertional SOB, orthopnea, PND x 1 month so she was told to come to ED for evaluation. She reports compliance with Xarelto. Reports cough with white sputum but no f/c, N/v/d, hemoptysis or recent travel. noted worsening SOB, orthopnea x 2 months so she was told to come to ED.     PMHx:   PE (pulmonary thromboembolism)  Asthma with status asthmaticus, unspecified asthma severity  Sinus tachycardia  Asthma  Pulmonary embolism  Pulmonary hypertension  Anemia  Tachycardia  Pulmonary hypertension  Asthma      PSHx:   Pacemaker  No significant past surgical history  History of tubal ligation      Allergies:  penicillin (Unknown)  vancomycin (Unknown)      Home Meds:  Remodulin 17ng/kg/min  Xarelto 10 daily (reduced dose due to anemia)  Atrovent/albuterol nebs    FAMILY HISTORY:  Family history of diabetes mellitus in mother  Family history of diabetes mellitus      Social History:  No toxic habits    REVIEW OF SYSTEMS:  CONSTITUTIONAL: No weakness, fevers or chills  EYES/ENT: No visual changes;  No dysphagia  NECK: No pain or stiffness  RESPIRATORY: No cough, wheezing, hemoptysis; No shortness of breath  CARDIOVASCULAR: No chest pain or palpitations; No lower extremity edema  GASTROINTESTINAL: No abdominal or epigastric pain. No nausea, vomiting, or hematemesis; No diarrhea or constipation. No melena or hematochezia.  BACK: No back pain  GENITOURINARY: No dysuria, frequency or hematuria  NEUROLOGICAL: No numbness or weakness  SKIN: No itching, burning, rashes, or lesions   All other review of systems is negative unless indicated above.    Physical Exam:  T(F): 98.2 (07-21), Max: 98.5 (07-21)  HR: 100 (07-21) (100 - 107)  BP: 135/81 (07-21) (135/81 - 139/83)  RR: 20 (07-21)  SpO2: 98% (07-21)  GENERAL: No acute distress, well-developed  HEAD:  Atraumatic, Normocephalic  ENT: EOMI, PERRLA, conjunctiva and sclera clear, Neck supple, moist mucosa  CHEST/LUNG: Clear to auscultation bilaterally; No wheeze, equal breath sounds bilaterally.   BACK: No spinal tenderness  HEART: regular, borderline tachycardic; nl s1 s2, no murmurs, rubs, or gallops. No JVD  ABDOMEN: Soft, Nontender, Nondistended; Bowel sounds present. Remodulin injection site c/d/i  EXTREMITIES:  No clubbing, cyanosis, or edema  PSYCH: Nl behavior, nl affect  NEUROLOGY: AAOx3, non-focal, cranial nerves intact  SKIN: Normal color, No rashes or lesions    LINES:    Labs:  reviewed                        11.1   9.91  )-----------( 428      ( 21 Jul 2020 10:55 )             38.7     07-21    136  |  106  |  17  ----------------------------<  86  4.2   |  15<L>  |  1.07    Ca    9.0      21 Jul 2020 10:55    TPro  7.2  /  Alb  4.3  /  TBili  0.6  /  DBili  x   /  AST  19  /  ALT  8<L>  /  AlkPhos  56  07-21    PT/INR - ( 21 Jul 2020 10:55 )   PT: 12.4 sec;   INR: 1.04 ratio         PTT - ( 21 Jul 2020 10:55 )  PTT:40.6 sec    CARDIAC MARKERS ( 21 Jul 2020 12:21 )  <6 ng/L / x     / x     / x     / x     / x      CARDIAC MARKERS ( 21 Jul 2020 10:55 )  7 ng/L / x     / x     / x     / x     / x            Serum Pro-Brain Natriuretic Peptide: 847 pg/mL (07-21 @ 10:55)      Cardiovascular Diagnostic Testing:    ECG: sinus tachycardia with RAD, tall r waves in V1, precordial TWI and TWI inferiorly    Echo: ------------------------------------------------------------------------  Conclusions:  1. Normal left ventricular internal dimensions and wall  thicknesses.  2. Normal left ventricular systolic function. No segmental  wall motion abnormalities. Septal motion consistent with  right ventricular overload.  3. Right atrial enlargement.  4. Right ventricular enlargement with decreased right  ventricular systolic function.  5. Normal tricuspid valve. Mild tricuspid regurgitation.  6. Estimated pulmonary artery systolic pressure equals 55  mm Hg, assuming right atrial pressure equals 8 mm Hg,  consistent with moderate pulmonary pressures.  *** Compared with echocardiogram of 5/21/2018, estimated  PASP is reduced.      Imaging:    CXR:  FINDINGS:  The cardiac silhouette is normal in size. There are no focal consolidations or pleural effusions. There is prominence of the pulmonary artery, unchanged. The osseous structures are intact.    IMPRESSION: Clear lungs. 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    Pt was diagnosed with pulmonary hypertension 6 years ago. She was diagnosed with PAH WHO group 1 with chronic RV systolic dysfunction. She was found to have e/o distal pulmonary emboli and had previously been on treatment with riociquat. She has been evaluated at [facility] for transplant where she was started on IV treprostinil (primary complaint is jaw pain as side effect). She was last seen by Dr. Garcia 1/2020 with poor functional capacity and has NYHA Class III symptoms. Via televisit 4/2020, her treprostinil was uptitrated from 16 ng/kg/min to 17.     She reports doing well since last televisit with Dr. Garcia. Her baseline exercise tolerance is approximately 1/2 block to 1 block. She was evaluated by hematology in 5/2020 who reduced her Xarelto dosing from 20 to 10 given history of anemia. She was last seen by Dr. Yoon yesterday. She reported exertional SOB, orthopnea, PND x 1 month so she was told to come to ED for evaluation. She reports compliance with Xarelto. Reports cough with white sputum but no f/c, N/v/d, hemoptysis or recent travel. noted worsening SOB, orthopnea x 2 months so she was told to come to ED.     PMHx:   PE (pulmonary thromboembolism)  Asthma with status asthmaticus, unspecified asthma severity  Sinus tachycardia  Asthma  Pulmonary embolism  Pulmonary hypertension  Anemia  Tachycardia  Pulmonary hypertension  Asthma      PSHx:   Pacemaker  No significant past surgical history  History of tubal ligation      Allergies:  penicillin (Unknown)  vancomycin (Unknown)      Home Meds:  Remodulin 17ng/kg/min  Xarelto 10 daily (reduced dose due to anemia)  Atrovent/albuterol nebs    FAMILY HISTORY:  Family history of diabetes mellitus in mother  Family history of diabetes mellitus      Social History:  No toxic habits    REVIEW OF SYSTEMS:  CONSTITUTIONAL: No weakness, fevers or chills  EYES/ENT: No visual changes;  No dysphagia  NECK: No pain or stiffness  RESPIRATORY: No cough, wheezing, hemoptysis; No shortness of breath  CARDIOVASCULAR: No chest pain or palpitations; No lower extremity edema  GASTROINTESTINAL: No abdominal or epigastric pain. No nausea, vomiting, or hematemesis; No diarrhea or constipation. No melena or hematochezia.  BACK: No back pain  GENITOURINARY: No dysuria, frequency or hematuria  NEUROLOGICAL: No numbness or weakness  SKIN: No itching, burning, rashes, or lesions   All other review of systems is negative unless indicated above.    Physical Exam:  T(F): 98.2 (07-21), Max: 98.5 (07-21)  HR: 100 (07-21) (100 - 107)  BP: 135/81 (07-21) (135/81 - 139/83)  RR: 20 (07-21)  SpO2: 98% (07-21)  GENERAL: No acute distress, well-developed  HEAD:  Atraumatic, Normocephalic  ENT: EOMI, PERRLA, conjunctiva and sclera clear, Neck supple, moist mucosa  CHEST/LUNG: Clear to auscultation bilaterally; No wheeze, equal breath sounds bilaterally.   BACK: No spinal tenderness  HEART: regular, borderline tachycardic; nl s1 s2, no murmurs, rubs, or gallops. No JVD  ABDOMEN: Soft, Nontender, Nondistended; Bowel sounds present. Remodulin injection site c/d/i  EXTREMITIES:  No clubbing, cyanosis, or edema  PSYCH: Nl behavior, nl affect  NEUROLOGY: AAOx3, non-focal, cranial nerves intact  SKIN: Normal color, No rashes or lesions    LINES:    Labs:  reviewed                        11.1   9.91  )-----------( 428      ( 21 Jul 2020 10:55 )             38.7     07-21    136  |  106  |  17  ----------------------------<  86  4.2   |  15<L>  |  1.07    Ca    9.0      21 Jul 2020 10:55    TPro  7.2  /  Alb  4.3  /  TBili  0.6  /  DBili  x   /  AST  19  /  ALT  8<L>  /  AlkPhos  56  07-21    PT/INR - ( 21 Jul 2020 10:55 )   PT: 12.4 sec;   INR: 1.04 ratio         PTT - ( 21 Jul 2020 10:55 )  PTT:40.6 sec    CARDIAC MARKERS ( 21 Jul 2020 12:21 )  <6 ng/L / x     / x     / x     / x     / x      CARDIAC MARKERS ( 21 Jul 2020 10:55 )  7 ng/L / x     / x     / x     / x     / x            Serum Pro-Brain Natriuretic Peptide: 847 pg/mL (07-21 @ 10:55)      Cardiovascular Diagnostic Testing:    ECG: sinus tachycardia with RAD, tall r waves in V1, precordial TWI and TWI inferiorly    Echo: ------------------------------------------------------------------------  Conclusions:  1. Normal left ventricular internal dimensions and wall  thicknesses.  2. Normal left ventricular systolic function. No segmental  wall motion abnormalities. Septal motion consistent with  right ventricular overload.  3. Right atrial enlargement.  4. Right ventricular enlargement with decreased right  ventricular systolic function.  5. Normal tricuspid valve. Mild tricuspid regurgitation.  6. Estimated pulmonary artery systolic pressure equals 55  mm Hg, assuming right atrial pressure equals 8 mm Hg,  consistent with moderate pulmonary pressures.  *** Compared with echocardiogram of 5/21/2018, estimated  PASP is reduced.      Imaging:    CXR:  FINDINGS:  The cardiac silhouette is normal in size. There are no focal consolidations or pleural effusions. There is prominence of the pulmonary artery, unchanged. The osseous structures are intact.    IMPRESSION: Clear lungs. 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    Pt was diagnosed with pulmonary hypertension 6 years ago. She was diagnosed with PAH WHO group 1 with chronic RV systolic dysfunction. She was found to have e/o distal pulmonary emboli and had previously been on treatment with riociquat. She has been evaluated at [facility] for transplant where she was started on IV treprostinil (primary complaint is jaw pain as side effect). She was last seen by Dr. Garcia 1/2020 with poor functional capacity and has NYHA Class III symptoms. Via televisit 4/2020, her treprostinil was uptitrated from 16 ng/kg/min to 17.     She reports doing well since last televisit with Dr. Garcia. Her baseline exercise tolerance is approximately 1/2 block to 1 block. She was evaluated by hematology in 5/2020 who reduced her Xarelto dosing from 20 to 10 given history of anemia. She was last seen by Dr. Yoon yesterday. She reported exertional SOB, orthopnea, PND x 1 month so she was told to come to ED for evaluation. She reports compliance with Xarelto. Reports cough with white sputum but no f/c, N/v/d, hemoptysis or recent travel. noted worsening SOB, orthopnea x 2 months so she was told to come to ED.     PMHx:   PE (pulmonary thromboembolism)  Asthma with status asthmaticus, unspecified asthma severity  Sinus tachycardia  Asthma  Pulmonary embolism  Pulmonary hypertension  Anemia  Tachycardia  Pulmonary hypertension  Asthma      PSHx:   Pacemaker  No significant past surgical history  History of tubal ligation      Allergies:  penicillin (Unknown)  vancomycin (Unknown)      Home Meds:  Remodulin 17ng/kg/min  Xarelto 10 daily (reduced dose due to anemia)  Atrovent/albuterol nebs    FAMILY HISTORY:  Family history of diabetes mellitus in mother  Family history of diabetes mellitus      Social History:  No toxic habits    REVIEW OF SYSTEMS:  CONSTITUTIONAL: No weakness, fevers or chills  EYES/ENT: No visual changes;  No dysphagia  NECK: No pain or stiffness  RESPIRATORY: No cough, wheezing, hemoptysis; No shortness of breath  CARDIOVASCULAR: No chest pain or palpitations; No lower extremity edema  GASTROINTESTINAL: No abdominal or epigastric pain. No nausea, vomiting, or hematemesis; No diarrhea or constipation. No melena or hematochezia.  BACK: No back pain  GENITOURINARY: No dysuria, frequency or hematuria  NEUROLOGICAL: No numbness or weakness  SKIN: No itching, burning, rashes, or lesions   All other review of systems is negative unless indicated above.    Physical Exam:  T(F): 98.2 (07-21), Max: 98.5 (07-21)  HR: 100 (07-21) (100 - 107)  BP: 135/81 (07-21) (135/81 - 139/83)  RR: 20 (07-21)  SpO2: 98% (07-21)  GENERAL: No acute distress, well-developed  HEAD:  Atraumatic, Normocephalic  ENT: EOMI, PERRLA, conjunctiva and sclera clear, Neck supple, moist mucosa  CHEST/LUNG: Clear to auscultation bilaterally; No wheeze, equal breath sounds bilaterally.   BACK: No spinal tenderness  HEART: regular, borderline tachycardic; nl s1 s2, no murmurs, rubs, or gallops. No JVD  ABDOMEN: Soft, Nontender, Nondistended; Bowel sounds present. Remodulin injection site c/d/i  EXTREMITIES:  No clubbing, cyanosis, or edema  PSYCH: Nl behavior, nl affect  NEUROLOGY: AAOx3, non-focal, cranial nerves intact  SKIN: Normal color, No rashes or lesions    LINES:    Labs:  reviewed                        11.1   9.91  )-----------( 428      ( 21 Jul 2020 10:55 )             38.7     07-21    136  |  106  |  17  ----------------------------<  86  4.2   |  15<L>  |  1.07    Ca    9.0      21 Jul 2020 10:55    TPro  7.2  /  Alb  4.3  /  TBili  0.6  /  DBili  x   /  AST  19  /  ALT  8<L>  /  AlkPhos  56  07-21    PT/INR - ( 21 Jul 2020 10:55 )   PT: 12.4 sec;   INR: 1.04 ratio         PTT - ( 21 Jul 2020 10:55 )  PTT:40.6 sec    CARDIAC MARKERS ( 21 Jul 2020 12:21 )  <6 ng/L / x     / x     / x     / x     / x      CARDIAC MARKERS ( 21 Jul 2020 10:55 )  7 ng/L / x     / x     / x     / x     / x            Serum Pro-Brain Natriuretic Peptide: 847 pg/mL (07-21 @ 10:55)      Cardiovascular Diagnostic Testing:    ECG: sinus tachycardia with RAD, tall r waves in V1, precordial TWI and TWI inferiorly    Echo: ------------------------------------------------------------------------  Conclusions:  1. Normal left ventricular internal dimensions and wall  thicknesses.  2. Normal left ventricular systolic function. No segmental  wall motion abnormalities. Septal motion consistent with  right ventricular overload.  3. Right atrial enlargement.  4. Right ventricular enlargement with decreased right  ventricular systolic function.  5. Normal tricuspid valve. Mild tricuspid regurgitation.  6. Estimated pulmonary artery systolic pressure equals 55  mm Hg, assuming right atrial pressure equals 8 mm Hg,  consistent with moderate pulmonary pressures.  *** Compared with echocardiogram of 5/21/2018, estimated  PASP is reduced.      Imaging:    CXR:  FINDINGS:  The cardiac silhouette is normal in size. There are no focal consolidations or pleural effusions. There is prominence of the pulmonary artery, unchanged. The osseous structures are intact.    IMPRESSION: Clear lungs.

## 2020-07-21 NOTE — ED PEDIATRIC NURSE REASSESSMENT NOTE - NS ED NURSE REASSESS COMMENT FT2
Pt used bedpan, all comfort measures taken, pt slept comfortably on stretcher from 12 to aprox 3pm. Vitals WNL except heart rate elevated (as per pt, normal condition) will continue to monitor closely. MD (adm team) at the bedside.

## 2020-07-21 NOTE — H&P ADULT - PROBLEM SELECTOR PLAN 3
Pt has baseline asthma, getting worse over past month, atrovent requirement now up to 5 times a day.   -2L NC at night  -c/w home atrovent  -add spiriva

## 2020-07-21 NOTE — H&P ADULT - NSHPLABSRESULTS_GEN_ALL_CORE
Labs  CBC Full  -  ( 21 Jul 2020 10:55 )  WBC Count : 9.91 K/uL  RBC Count : 6.15 M/uL  Hemoglobin : 11.1 g/dL  Hematocrit : 38.7 %  Platelet Count - Automated : 428 K/uL  Mean Cell Volume : 62.9 fl  Mean Cell Hemoglobin : 18.0 pg  Mean Cell Hemoglobin Concentration : 28.7 gm/dL  Auto Neutrophil # : x  Auto Lymphocyte # : x  Auto Monocyte # : x  Auto Eosinophil # : x  Auto Basophil # : x  Auto Neutrophil % : x  Auto Lymphocyte % : x  Auto Monocyte % : x  Auto Eosinophil % : x  Auto Basophil % : x    07-21    136  |  106  |  17  ----------------------------<  86  4.2   |  15<L>  |  1.07    Ca    9.0      21 Jul 2020 10:55    TPro  7.2  /  Alb  4.3  /  TBili  0.6  /  DBili  x   /  AST  19  /  ALT  8<L>  /  AlkPhos  56  07-21    PT/INR - ( 21 Jul 2020 10:55 )   PT: 12.4 sec;   INR: 1.04 ratio      PTT - ( 21 Jul 2020 10:55 )  PTT:40.6 sec    Blood Gas Profile - Venous (07.21.20 @ 20:29)    pH, Venous: 7.40    pCO2, Venous: 38 mmHg    pO2, Venous: 20 mmHg    HCO3, Venous: 23 mmol/L    Base Excess, Venous: -0.9 mmol/L    Oxygen Saturation, Venous: 19 %    Total CO2, Venous: 24 mmol/L    Troponin T, High Sensitivity (07.21.20 @ 12:21)    Troponin T, High Sensitivity Result: <6  Troponin T, High Sensitivity (07.21.20 @ 10:55)    Troponin T, High Sensitivity Result: 7    Serum Pro-Brain Natriuretic Peptide (07.21.20 @ 10:55)    Serum Pro-Brain Natriuretic Peptide: 847 pg/mL    Rapid HIV-1/2 Antibody (07.21.20 @ 10:53)    Rapid HIV-1/2 Antibody: Nonreact: Rapid HIV test reactive results are preliminary. Further confirmatory  testing will follow according to the [facility] HIV testing algorithm, the  results of which must be considered when making a HIV diagnosis. Further  HIV tests include a HIV 4th generation antibody/antigen assay, HIV  confirmatory/differentiation testing, and nucleic acid testing if needed.  [place] State Law prohibits disclosure of this result to any unauthorized  party.  TEST PERFORMED ON COBAS07/21/20 11:54      CAPILLARY BLOOD GLUCOSE    Imaging    EXAM:  CT ANGIO CHEST (W)AW IC                        PROCEDURE DATE:  07/21/2020    IMPRESSION:   The pulmonary trunk, left main, right main and lobar arteries are enlarged, consistent with pulmonary hypertension. No pulmonary embolism in the left main, right main or lobar arteries. The distal pulmonary arteries are diminished in size.    Mosaic attenuation noted within both lungs is most likely secondary to pulmonary hypertension.    Nodule in the medial left breast, correlate with mammography/ultrasound.    EXAM:  XR CHEST PA LAT 2V                        PROCEDURE DATE:  07/21/2020    INTERPRETATION:  HISTORY: Shortness of breath  TECHNIQUE: PA and lateral views of the chest were obtained.  COMPARISON: 5/17/2020  FINDINGS:  The cardiac silhouette is normal in size. There are no focal consolidations or pleural effusions. There is prominence of the pulmonary artery, unchanged. The osseous structures are intact.  IMPRESSION: Clear lungs. Labs  CBC Full  -  ( 21 Jul 2020 10:55 )  WBC Count : 9.91 K/uL  RBC Count : 6.15 M/uL  Hemoglobin : 11.1 g/dL  Hematocrit : 38.7 %  Platelet Count - Automated : 428 K/uL  Mean Cell Volume : 62.9 fl  Mean Cell Hemoglobin : 18.0 pg  Mean Cell Hemoglobin Concentration : 28.7 gm/dL  Auto Neutrophil # : x  Auto Lymphocyte # : x  Auto Monocyte # : x  Auto Eosinophil # : x  Auto Basophil # : x  Auto Neutrophil % : x  Auto Lymphocyte % : x  Auto Monocyte % : x  Auto Eosinophil % : x  Auto Basophil % : x    07-21    136  |  106  |  17  ----------------------------<  86  4.2   |  15<L>  |  1.07    Ca    9.0      21 Jul 2020 10:55    TPro  7.2  /  Alb  4.3  /  TBili  0.6  /  DBili  x   /  AST  19  /  ALT  8<L>  /  AlkPhos  56  07-21    PT/INR - ( 21 Jul 2020 10:55 )   PT: 12.4 sec;   INR: 1.04 ratio      PTT - ( 21 Jul 2020 10:55 )  PTT:40.6 sec    Blood Gas Profile - Venous (07.21.20 @ 20:29)    pH, Venous: 7.40    pCO2, Venous: 38 mmHg    pO2, Venous: 20 mmHg    HCO3, Venous: 23 mmol/L    Base Excess, Venous: -0.9 mmol/L    Oxygen Saturation, Venous: 19 %    Total CO2, Venous: 24 mmol/L    Troponin T, High Sensitivity (07.21.20 @ 12:21)    Troponin T, High Sensitivity Result: <6  Troponin T, High Sensitivity (07.21.20 @ 10:55)    Troponin T, High Sensitivity Result: 7    Serum Pro-Brain Natriuretic Peptide (07.21.20 @ 10:55)    Serum Pro-Brain Natriuretic Peptide: 847 pg/mL    Rapid HIV-1/2 Antibody (07.21.20 @ 10:53)    Rapid HIV-1/2 Antibody: Nonreact: Rapid HIV test reactive results are preliminary. Further confirmatory  testing will follow according to the [facility] HIV testing algorithm, the  results of which must be considered when making a HIV diagnosis. Further  HIV tests include a HIV 4th generation antibody/antigen assay, HIV  confirmatory/differentiation testing, and nucleic acid testing if needed.  [place] State Law prohibits disclosure of this result to any unauthorized  party.  TEST PERFORMED ON COBAS07/21/20 11:54      CAPILLARY BLOOD GLUCOSE    Imaging    EXAM:  CT ANGIO CHEST (W)AW IC                        PROCEDURE DATE:  07/21/2020    IMPRESSION:   The pulmonary trunk, left main, right main and lobar arteries are enlarged, consistent with pulmonary hypertension. No pulmonary embolism in the left main, right main or lobar arteries. The distal pulmonary arteries are diminished in size.    Mosaic attenuation noted within both lungs is most likely secondary to pulmonary hypertension.    Nodule in the medial left breast, correlate with mammography/ultrasound.    EXAM:  XR CHEST PA LAT 2V                        PROCEDURE DATE:  07/21/2020    INTERPRETATION:  HISTORY: Shortness of breath  TECHNIQUE: PA and lateral views of the chest were obtained.  COMPARISON: 5/17/2020  FINDINGS:  The cardiac silhouette is normal in size. There are no focal consolidations or pleural effusions. There is prominence of the pulmonary artery, unchanged. The osseous structures are intact.  IMPRESSION: Clear lungs.    EXAM:  ER TTE LIMITED    PROCEDURE DATE:  07/21/2020    FOCUSED ED ULTRASOUND REPORT  IMPRESSION:   No significant Pericardial Effusion.  Right ventricular enlargement

## 2020-07-21 NOTE — H&P ADULT - NSHPSOCIALHISTORY_GEN_ALL_CORE
Pt denies alcohol, tobacco, or drug use. Pt does not work, living situation otherwise unknown. Pt denies alcohol, tobacco, or drug use. Pt is , does not work, independent in ADLs, lives at home with family.

## 2020-07-21 NOTE — H&P ADULT - PROBLEM SELECTOR PLAN 7
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 Pt described no changes in vision, vision at baseline per patient, plan for patient to follow up outpatient with ophthalmologist.

## 2020-07-21 NOTE — CONSULT NOTE ADULT - PROBLEM SELECTOR RECOMMENDATION 9
-c/w remodulin at 17 ng/kg/min  -discuss with Dr. Yoon  -repeat 2D TTE -c/w remodulin at 17 ng/kg/min  -discuss with Dr. Yoon  -repeat 2D TTE  -start lasix IV 40 bid for now,  -check daily weights, strict Is/Os

## 2020-07-21 NOTE — H&P ADULT - NSHPPHYSICALEXAM_GEN_ALL_CORE
PHYSICAL EXAM:  GENERAL: NAD, well-developed  HEAD:  Atraumatic, Normocephalic  EYES: EOMI, PERRLA, conjunctiva and sclera clear  NECK: Supple, No JVD  CHEST/LUNG: Clear to auscultation bilaterally; No wheeze  HEART: Regular rate and rhythm; , rubs, or gallops  ABDOMEN: Soft, Nontender, distended; Bowel sounds present  EXTREMITIES:  well-perfused, No clubbing, cyanosis, 1+ b/l LE edema  PSYCH: AAOx3  NEUROLOGY: non-focal  SKIN: Mild rash at ekg lead tape site, left thigh skin tag (1 in diameter) PHYSICAL EXAM:  GENERAL: NAD, well-developed  HEAD:  Atraumatic, Normocephalic  EYES: EOMI, conjunctiva and sclera clear  NECK: Supple, No JVD  CHEST/LUNG: occasional crackles at base of lungs; No wheeze  HEART: Regular rate and rhythm; , rubs, or gallops  ABDOMEN: Soft, Nontender, mildly distended; Bowel sounds present  EXTREMITIES:  well-perfused, No clubbing, cyanosis, 1+ b/l LE edema  PSYCH: AAOx3  NEUROLOGY: non-focal  SKIN: Mild rash at ekg lead tape site, left thigh skin tag, round pedunculated, no erythema or induration (1 in. diameter)

## 2020-07-21 NOTE — H&P ADULT - NSICDXFAMILYHX_GEN_ALL_CORE_FT
FAMILY HISTORY:  Family history of diabetes mellitus, in brother  Family history of diabetes mellitus in mother  FH: anemia

## 2020-07-21 NOTE — CONSULT NOTE ADULT - ASSESSMENT
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     #SOB/pHTN  - suspect significant component of exacerbation 2/2 fluid overload, possibly worsened by recent hospitalization for infection. While currently normotensive and at baseline tachycardia, symptoms of exertional lightheadedness and imaging w/ significant RV dilation and decreased LV are concerning for signs of LV compromise iso RV dysfunction. w/o troponemia and only mildly elevated BNP, but may be falsely low iso body habitus. No signs of acute infection. Possible component of worsening chronic pulmonary disease.   - recommend IV diuresis with initial 40mg IV; STRICT IOs with goal net negative 500 to 1000cc daily  - recommend close monitoring of hemodynamics, PLEASE AVOID EXCESSIVE IV FLUIDS AS MAY WORSEN RV DILATION AND FURTHER COMPROMISE LV FUNCTION/CARDIAC OUTPUT  - please obtain VBG for evaluation of end organ hypoperfusion  - c/w currently dose of remodulin 17  - can re-initiate 10mg prednisone daily, no signs of acute asthma exacerbation  - prn ipratropium, okay to defer albuterol iso tachycardia  - please be cautious with IV beta blocker as may worsen asthma and, more importantly, worsen LV function  - obtain TTE  - okay to continue nocturnal O2  - recommend telemetry monitoring  - low threshold for uptriage to ICU for ionotrope assisted diuresis if worsening signs of end ogan dysfunction or hypoperfusion/hypotension   - pulmonary to follow    Demarco Ortiz MD  PGY-4  PCCM Fellow  Pager [phone]    Note unofficial until cosigned by attending

## 2020-07-21 NOTE — H&P ADULT - ATTENDING COMMENTS
Patient seen and examined with the resident and team at bedside, Labs and vital are reviewed.  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  TTE   heart failure and pulmonary note appreciated   will monitor closely   low threshold for MICU consult

## 2020-07-21 NOTE — ED ADULT NURSE NOTE - NSIMPLEMENTINTERV_GEN_ALL_ED
Implemented All Fall Risk Interventions:  [place] to call system. Call bell, personal items and telephone within reach. Instruct patient to call for assistance. Room bathroom lighting operational. Non-slip footwear when patient is off stretcher. Physically safe environment: no spills, clutter or unnecessary equipment. Stretcher in lowest position, wheels locked, appropriate side rails in place. Provide visual cue, wrist band, yellow gown, etc. Monitor gait and stability. Monitor for mental status changes and reorient to person, place, and time. Review medications for side effects contributing to fall risk. Reinforce activity limits and safety measures with patient and family.

## 2020-07-21 NOTE — CONSULT NOTE ADULT - ATTENDING COMMENTS
40yrs, WHO I PHTN on IV remodulin followed by Dr Yoon and Radha.   PE on Xarelto (2014). AVNRT s/p ablation 5/20. PPM for 'bradycaardia'  PHTN diagnosed 6 years ago found to have PE at that time. Initially reocigrat. Recently referred to [place] (1 visit) for possible lung transplant when she was intiated  on remodulin. Seen by Dr Garcia in Jan in [facility] 3 symptoms. Goal was to uptitrate remodulin as it appeared that she was symptomatically responding to remodulin. Dose increased 16ng/kg/min to 17. no change in symptoms with this.   Baseline ex tolerance is 1/2 block. over the past month worsening exercise tolerance. Reports orthopnea. Home O2 at night. Spoke to Dr Yoon who referred the patient here. Feels nathanael larose,   meds: remoduline 17, xarelto 10 (this was reduced in may from 20 after discussion between Dr Yoon and hematology), albuterol. no diuretics.   TTE: 11.19 normal LV, RA enlarged, RV enlarged decreased, mild TR. RVSP 55.   RHC 2018: RA 4, PA 88/31 51, PCWP 6, PA 61, Xenia 5.7/2.8 PVR 8.6. 40yrs, WHO I PHTN on IV remodulin followed by Dr Yoon and Radha.   PE on Xarelto (2014). AVNRT s/p ablation 5/20. PPM for 'bradycaardia'  PHTN diagnosed 6 years ago found to have PE at that time. Initially reocigrat. Recently referred to [place] (1 visit) for possible lung transplant when she was intiated  on remodulin. Seen by Dr Garcia in Jan in [facility] 3 symptoms. Goal was to uptitrate remodulin as it appeared that she was symptomatically responding to remodulin. Dose increased 16ng/kg/min to 17. no change in symptoms with this.   Baseline ex tolerance is 1/2 block. over the past month worsening exercise tolerance. Reports orthopnea. Home O2 at night. Spoke to Dr Yoon who referred the patient here. Feels nathanael larose,   meds: remoduline 17, xarelto 10 (this was reduced in may from 20 after discussion between Dr Yoon and hematology), albuterol. no diuretics.   TTE: 11.19 normal LV, RA enlarged, RV enlarged decreased, mild TR. RVSP 55.   RHC 2018: RA 4, PA 88/31 51, PCWP 6, PA 61, Xenia 5.7/2.8 PVR 8.6.  Afebrile, 100 135/81, 98  07-21  136  |  106  |  17  ----------------------------<  86  4.2   |  15<L>  |  1.07  Ca    9.0      21 Jul 2020 10:55  TPro  7.2  /  Alb  4.3  /  TBili  0.6  /  DBili  x   /  AST  19  /  ALT  8<L>  /  AlkPhos  56  07-21                        11.1   9.91  )-----------( 428      ( 21 Jul 2020 10:55 )             38.7   [id number]  CT angio pending. 40yrs, WHO I PHTN on IV remodulin followed by Dr Yoon and Radha.   PE on Xarelto (2014). AVNRT s/p ablation 5/20. PPM for 'bradycaardia'  PHTN diagnosed 6 years ago found to have PE at that time. Initially reocigrat. Recently referred to [place] (1 visit) for possible lung transplant when she was intiated  on remodulin. Seen by Dr Garcia in Jan in [facility] 3 symptoms. Goal was to uptitrate remodulin as it appeared that she was symptomatically responding to remodulin. Dose increased 16ng/kg/min to 17. no change in symptoms with this.   Baseline ex tolerance is 1/2 block. over the past month worsening exercise tolerance. Reports orthopnea. Home O2 at night. Spoke to Dr Yoon who referred the patient here. Feels abdo distension,   meds: remoduline 17, xarelto 10 (this was reduced in may from 20 after discussion between Dr Yoon and hematology), albuterol. no diuretics.   TTE: 11.19 normal LV, RA enlarged, RV enlarged decreased, mild TR. RVSP 55.   RHC 2018: RA 4, PA 88/31 51, PCWP 6, PA 61, Xenia 5.7/2.8 PVR 8.6.  Afebrile, 100 135/81, 98  no clear evidence of JVP, ascites, or LE edema.   07-21  136  |  106  |  17  ----------------------------<  86  4.2   |  15<L>  |  1.07  Ca    9.0      21 Jul 2020 10:55  TPro  7.2  /  Alb  4.3  /  TBili  0.6  /  DBili  x   /  AST  19  /  ALT  8<L>  /  AlkPhos  56  07-21                        11.1   9.91  )-----------( 428      ( 21 Jul 2020 10:55 )             38.7   [id number]  CT angio pending.  40yrs with ? CTEPH on remodulin with recent worsening exercise tolerance. symptomatic improvement on remodulin and improvement in PA pressures by TTE.  now likely component of worsening right heart failure in view of symptoms of abdo distension although exam is not revealing.   Will manage with empiric IV diuretics. Close f/u renal function in setting of dye load for CT.   Plan:  Lasix IV 40 bid.   continue outpatient meds.   Dr Yoon's team to follow and adjust remodulin as appropriate. 40yrs, WHO I PHTN on IV remodulin followed by Dr Yoon and Radha.   PE on Xarelto (2014). AVNRT s/p ablation 5/20. PPM for 'bradycaardia'  PHTN diagnosed 6 years ago found to have PE at that time. Initially reocigrat. Recently referred to [place] (1 visit) for possible lung transplant when she was intiated  on remodulin. Seen by Dr Garcia in Jan in [facility] 3 symptoms. Goal was to uptitrate remodulin as it appeared that she was symptomatically responding to remodulin. Dose increased 16ng/kg/min to 17. no change in symptoms with this.   Baseline ex tolerance is 1/2 block. over the past month worsening exercise tolerance. Reports orthopnea. Home O2 at night. Spoke to Dr Yoon who referred the patient here. Feels abdo distension,   meds: remoduline 17, xarelto 10 (this was reduced in may from 20 after discussion between Dr Yoon and hematology), albuterol. no diuretics.   TTE: 11.19 normal LV, RA enlarged, RV enlarged decreased, mild TR. RVSP 55.   RHC 2018: RA 4, PA 88/31 51, PCWP 6, PA 61, Xenia 5.7/2.8 PVR 8.6.  Afebrile, 100 135/81, 98  no clear evidence of JVP, ascites, or LE edema.   07-21  136  |  106  |  17  ----------------------------<  86  4.2   |  15<L>  |  1.07  Ca    9.0      21 Jul 2020 10:55  TPro  7.2  /  Alb  4.3  /  TBili  0.6  /  DBili  x   /  AST  19  /  ALT  8<L>  /  AlkPhos  56  07-21                        11.1   9.91  )-----------( 428      ( 21 Jul 2020 10:55 )             38.7   [id number]  CT angio pending.  40yrs WHO I PHTN with history of PE, on remodulin with recent worsening exercise tolerance. symptomatic improvement on remodulin and improvement in PA pressures by TTE.  now likely component of worsening right heart failure in view of symptoms of abdo distension although exam is not revealing.   Will manage with empiric IV diuretics. Close f/u renal function in setting of dye load for CT.   Plan:  Lasix IV 40 bid.   continue outpatient meds.   Dr Yoon's team to follow and adjust remodulin as appropriate.

## 2020-07-21 NOTE — ED PROVIDER NOTE - OBJECTIVE STATEMENT
BETTY Nicole MD: Pt is a 39 y/o female with PMHx pulmonary HTN w/ Class I/IV features (dx in 2014) on continuous Remodulin infusions c/b RV systolic failure s/p PPM , chronic asthma on Atrovent and 2L nasal cannula at night, chronic PE on Xarelto 10mg, GERD, SVT s/p ablation (5/2020) who p/w SOB. Pt states that she has had chronically worsening SOB x 2 mo. +Orthopnea. No LE edema, however, states like she has "fluid" in her lungs. States this does not feel like her asthma. Denies CP. No recent sick contacts, no travel. No F/C. +Cough with white sputum. Called Dr. Yoon (Pulm) today who told her to come to ED. No medication changes. Pt denies: chest pain, fevers, chills, pleuritic chest pain, abdominal pain, n/v/d, back pain, neck pain, HA, neck stiffness, focal numbness or weakness, visual changes, dizziness, lightheadedness, leg pain/swelling, recent travel, recent trauma, recent immobilization, dysuria, hematuria, rash.   Pulmonary: Car  Cardiology: Radha

## 2020-07-21 NOTE — H&P ADULT - PROBLEM SELECTOR PLAN 4
Pt has chronic pulmonary embolism, CT-Angio showed distal pulmonary arteries are diminished in size.  Plan:  c/w home xarelto 10mg Daily

## 2020-07-21 NOTE — H&P ADULT - SEXUAL ORIENTATION
Problem: NORMAL   Goal: Experiences normal transition  Description  INTERVENTIONS:  - Assess and monitor vital signs and lab values.   - Encourage skin-to-skin with caregiver for thermoregulation  - Assess signs, symptoms and risk factors for hypog Don't know, Unknown Straight, Heterosexual

## 2020-07-21 NOTE — ED PROVIDER NOTE - ATTENDING CONTRIBUTION TO CARE
I saw and evaluated patient with resident. I discussed H+P and MDM with resident. I agree with the statements made by the resident unless otherwise noted.    The care of this patient was in support of the [facility] countermeasures to Covid-19.

## 2020-07-21 NOTE — H&P ADULT - PROBLEM SELECTOR PLAN 1
Pt has known RHF d/t pulmonary HTN. Pt c/o SOB for 1 month possibly 2/2 fluid overload in the setting of recent hospitalization w/fluids given. Pt at baseline HR and BP but given SOB and lightheadedness on exertion, TTE with RV enlargement and LV dysfunction possibly 2/2 to RHF. Elevated BNP with troponins 6<-7<-10<-9.   Plan - Pulm and Cardio recs appreciated greatly  - initial 40mg Lasix IV in ED;  - STRICT Is and Os with goal net negative 500 to 1000cc daily  close hemodynamic monitoring  - avoid excess IV fluids, may worsen RV dilation and further compromise LV function/cardiac output  - VBG for evaluation of end organ hypoperfusion  - re-initiate 10mg prednisone daily, no signs of acute asthma exacerbation  - prn ipratropium  - okay to defer albuterol iso tachycardia  - avoid IV beta blocker  - Close f/u renal function in setting of dye load for CT.  - after initial lasix, start 40mg lasix bid

## 2020-07-21 NOTE — H&P ADULT - HISTORY OF PRESENT ILLNESS
Pt is a 41 y/o female with PMHx pulmonary HTN w/ Class I/IV features (dx in 2014) on continuous Remodulin infusions c/b RV systolic failure s/p PPM, chronic asthma on Atrovent and 2L nasal cannula at night, chronic PE on Xarelto 10mg, GERD, SVT s/p ablation (5/2020) and multiple recent hospital admissions at [facility] and here who p/w worsening SOB of 1 month duration. She endorses orthopnea, intermittently, occurring several times a week. She c/o mild LE edema but is more concerned about feeling fluid in her chest and abdomen. States this does not feel like her asthma, but that her asthma attacks have increased over the past month and that her atrovent nebulizer use has increased to up to 5x per day. Denies CP but endorses chest tightness. No recent sick contacts, no travel. No F/C. +Cough with clear mucus sputum, states it feels like an asthmatic cough. Called Dr. Yoon, her pulmonologist today who told her to come to ED. Recent medication changes include slow increase in remodulin infusion dose and d/c of steroids last month following a hospital admission. Pt denies: chest pain, fevers, chills, pleuritic chest pain, abdominal pain, n/v/d, back pain, neck pain, HA, neck stiffness, focal numbness or weakness, visual changes, dizziness, lightheadedness, leg pain/swelling, recent travel, recent trauma, recent immobilization, dysuria, hematuria, rash. Pt is a 41 y/o female with PMHx pulmonary HTN w/ Class I/IV features (dx in 2014) on continuous Remodulin infusions c/b RV systolic failure s/p PPM, chronic asthma on Atrovent and 2L nasal cannula at night, chronic PE on Xarelto 10mg, GERD, SVT s/p ablation (5/2020) and multiple recent hospital admissions at [facility] and here who p/w worsening SOB of 1 month duration. She has SOB on exertion assoc. w/ dizziness, worsening over the last month, initially she could walk half a block now she can only walk a few steps w/out becoming short of breath.  She endorses orthopnea, intermittently, occurring several times a week. She c/o mild LE edema but is more concerned about feeling fluid in her chest and abdomen, states that her waistline has increased and her pants are tighter. States she has palpitations at baseline but over the last week she has not felt them so she knew she had fluid in lungs. States this does not feel like her asthma, but that her asthma attacks have increased over the past month and that her atrovent nebulizer use has increased to up to 5x per day. Denies CP but endorses chest tightness. No recent sick contacts, no travel. No F/C. +Cough with clear mucus sputum, states it feels like an asthmatic cough. She endorses chronic watery non-smelly brown diarrhea, per Pt d/t remodulin. Called Dr. Yoon, her pulmonologist today who told her to come to ED. Recent medication changes include slow increase in remodulin infusion dose and d/c of steroids last month following a hospital admission. Pt denies: chest pain, fevers, chills, pleuritic chest pain, abdominal pain, n/v, back pain, neck pain, HA, neck stiffness, focal numbness or weakness, visual changes, leg pain, recent travel, recent trauma, recent immobilization, dysuria, hematuria, rash. Pt is a 41 y/o female with PMHx pulmonary HTN w/ Class I/IV features (dx in 2014) on continuous Remodulin infusions c/b RV systolic failure s/p PPM, chronic asthma on Atrovent and 2L nasal cannula at night, chronic PE on Xarelto 10mg, GERD, SVT s/p ablation (5/2020),  and multiple recent hospital admissions at [facility] and here who p/w worsening SOB of 1 month duration. She has SOB on exertion assoc. w/ dizziness, worsening over the last month, initially she could walk half a block now she can only walk a few steps w/out becoming short of breath.  She endorses orthopnea, intermittently, occurring several times a week. She c/o mild LE edema but is more concerned about feeling fluid in her chest and abdomen, states that her waistline has increased and her pants are tighter. States she has palpitations at baseline but over the last week she has not felt them so she knew she had fluid in lungs. States this does not feel like her asthma, but that her asthma attacks have increased over the past month and that her atrovent nebulizer use has increased to up to 5x per day. Denies CP but endorses chest tightness. No recent sick contacts, no travel. No F/C. +Cough with clear mucus sputum, states it feels like an asthmatic cough. She endorses chronic watery non-smelly brown diarrhea, per Pt d/t remodulin. Called Dr. Yoon, her pulmonologist today who told her to come to ED. Recent medication changes include slow increase in remodulin infusion dose and d/c of steroids last month following a hospital admission. Pt denies: chest pain, fevers, chills, pleuritic chest pain, abdominal pain, n/v, back pain, neck pain, HA, neck stiffness, focal numbness or weakness, visual changes, leg pain, recent travel, recent trauma, recent immobilization, dysuria, hematuria, rash. Pt is a 39 y/o female with PMHx pulmonary HTN w/ Class I/IV features (dx in 2014) on continuous Remodulin infusions c/b RV systolic failure s/p PPM, chronic asthma on Atrovent and 2L nasal cannula at night, chronic PE on Xarelto 10mg, GERD, SVT s/p ablation (5/2020), chronic sinusitis, unspecified keratoconus who p/w worsening SOB of 1 month duration. She has SOB on exertion assoc. w/ dizziness, worsening over the last month, initially she could walk half a block now she can only walk a few steps w/out becoming short of breath.  She endorses orthopnea, intermittently, occurring several times a week. She c/o mild LE edema but is more concerned about feeling fluid in her chest and abdomen, states that her waistline has increased and her pants are tighter. States she has palpitations at baseline but over the last week she has not felt them so she knew she had fluid in lungs. States this does not feel like her asthma, but that her asthma attacks have increased over the past month and that her atrovent nebulizer use has increased to up to 5x per day. Denies CP but endorses chest tightness. No recent sick contacts, no travel. No F/C. +Cough with clear mucus sputum, states it feels like an asthmatic cough. She endorses chronic watery non-smelly brown diarrhea, per Pt d/t remodulin. Called Dr. Yoon, her pulmonologist today who told her to come to ED. Recent medication changes include slow increase in remodulin infusion dose and d/c of steroids last month following an episode of sinusitis. Pt denies: chest pain, fevers, chills, pleuritic chest pain, abdominal pain, n/v, back pain, neck pain, HA, neck stiffness, focal numbness or weakness, visual changes, leg pain, recent travel, recent trauma, recent immobilization, dysuria, hematuria, rash. Pt is a 39 y/o female with PMHx pulmonary HTN w/ Class I/IV features (dx in 2014) on continuous Remodulin infusions c/b RV systolic failure s/p PPM, chronic asthma on Atrovent and 2L nasal cannula at night, chronic PE on Xarelto 10mg, GERD, SVT s/p ablation (5/2020), chronic sinusitis, unspecified corneal disorder w/baseline vision loss who p/w worsening SOB of 1 month duration. She has SOB on exertion assoc. w/ dizziness, worsening over the last month, initially she could walk half a block now she can only walk a few steps w/out becoming short of breath.  She endorses orthopnea, intermittently, occurring several times a week. She c/o mild LE edema but is more concerned about feeling fluid in her chest and abdomen, states that her waistline has increased and her pants are tighter. States she has palpitations at baseline but over the last week she has not felt them so she knew she had fluid in lungs. States this does not feel like her asthma, but that her asthma attacks have increased over the past month and that her atrovent nebulizer use has increased to up to 5x per day. Also c/o recurrent sinusitis, currently feeling symptoms. Denies CP but endorses chest tightness. No recent sick contacts, no travel. No F/C. +Cough with clear mucus sputum, states it feels like an asthmatic cough. She endorses chronic watery non-smelly brown diarrhea, per Pt d/t remodulin. Called Dr. Yoon, her pulmonologist today who told her to come to ED. Recent medication changes include slow increase in remodulin infusion dose and d/c of steroids last month following an episode of sinusitis. Pt denies: chest pain, fevers, chills, pleuritic chest pain, abdominal pain, n/v, back pain, neck pain, HA, neck stiffness, focal numbness or weakness, visual changes, leg pain, recent travel, recent trauma, recent immobilization, dysuria, hematuria, rash.     in ED:  T(C): 36.8 (21 Jul 2020 19:05), Max: 36.9 (21 Jul 2020 09:51)  T(F): 98.2 (21 Jul 2020 19:05), Max: 98.5 (21 Jul 2020 09:51)  HR: 114 (21 Jul 2020 19:05) (100 - 114)  BP: 110/80 (21 Jul 2020 19:05) (110/80 - 139/83)  RR: 18 (21 Jul 2020 19:05) (18 - 20)  SpO2: 92% (21 Jul 2020 19:05) (92% - 98%)  Interventions: 40mg Lasix

## 2020-07-21 NOTE — H&P ADULT - NSHPREVIEWOFSYSTEMS_GEN_ALL_CORE
REVIEW OF SYSTEMS:  CONSTITUTIONAL: No weakness, fevers or chills  EYES/ENT: Poor vision in one eye;  Lightheadedness on exertion, no throat pain   NECK: No pain or stiffness  RESPIRATORY: mild productive cough, clear sputum, wheezing at night; +SOB on exertion  CARDIOVASCULAR: No chest pain, +palpitations at baseline, not present currently  GASTROINTESTINAL: No abdominal or epigastric pain. No nausea, vomiting, or hematemesis; +Diarrhea, no constipation. No melena or hematochezia.  GENITOURINARY: No dysuria, frequency or hematuria  NEUROLOGICAL: No numbness or weakness  SKIN: No itching, +rash 2/2 to ekg lead REVIEW OF SYSTEMS:  CONSTITUTIONAL: No weakness, fevers or chills  EYES/ENT: Poor vision in one eye;  Lightheadedness on exertion, no throat pain   NECK: No pain or stiffness  RESPIRATORY: mild productive cough, clear sputum, wheezing at night; +SOB on exertion  CARDIOVASCULAR: No chest pain, pleuritic or otherwise, +chest discomfort+palpitations at baseline, not present currently  GASTROINTESTINAL: No abdominal or epigastric pain. No nausea, vomiting, or hematemesis; +Diarrhea, no constipation. No melena or hematochezia.  GENITOURINARY: No dysuria, frequency or hematuria  NEUROLOGICAL: No numbness or weakness  SKIN: No itching, +rash 2/2 to ekg lead REVIEW OF SYSTEMS:  CONSTITUTIONAL: No weakness, fevers or chills  EYES/ENT: Poor vision in one eye;  Lightheadedness on exertion, no throat pain   NECK: No pain or stiffness  RESPIRATORY: mild productive cough, clear sputum, wheezing at night; +SOB on exertion  CARDIOVASCULAR: No chest pain, pleuritic or otherwise, +chest discomfort+palpitations at baseline, not present currently  GASTROINTESTINAL: No abdominal or epigastric pain. No nausea, vomiting, or hematemesis; +Diarrhea, no constipation. No melena or hematochezia.  GENITOURINARY: No dysuria, frequency or hematuria  NEUROLOGICAL: No numbness or weakness  SKIN: No itching, +rash 2/2 to ekg lead  Haem - on Xarelto but no easy bruisability  Endo- no polyuria or polydipsia REVIEW OF SYSTEMS:  CONSTITUTIONAL: No weakness, fevers or chills  EYES/ENT: Poor vision in one eye;  Lightheadedness on exertion, no throat pain, mild sinus pain  NECK: No pain or stiffness  RESPIRATORY: mild productive cough, clear sputum, wheezing at night; +SOB on exertion  CARDIOVASCULAR: No chest pain, pleuritic or otherwise, +chest discomfort+palpitations at baseline, not present currently  GASTROINTESTINAL: No abdominal or epigastric pain. No nausea, vomiting, or hematemesis; +Diarrhea, no constipation. No melena or hematochezia.  GENITOURINARY: No dysuria, frequency or hematuria  NEUROLOGICAL: No numbness or weakness  SKIN: No itching, +rash 2/2 to ekg lead  Haem - on Xarelto but no easy bruisability  Endo- no polyuria or polydipsia

## 2020-07-22 DIAGNOSIS — N63.0 UNSPECIFIED LUMP IN UNSPECIFIED BREAST: ICD-10-CM

## 2020-07-22 LAB
ALBUMIN SERPL ELPH-MCNC: 4.6 G/DL — SIGNIFICANT CHANGE UP (ref 3.3–5)
ALP SERPL-CCNC: 61 U/L — SIGNIFICANT CHANGE UP (ref 40–120)
ALT FLD-CCNC: 8 U/L — LOW (ref 10–45)
ANION GAP SERPL CALC-SCNC: 14 MMOL/L — SIGNIFICANT CHANGE UP (ref 5–17)
ANISOCYTOSIS BLD QL: SIGNIFICANT CHANGE UP
AST SERPL-CCNC: 14 U/L — SIGNIFICANT CHANGE UP (ref 10–40)
BASE EXCESS BLDV CALC-SCNC: -1 MMOL/L — SIGNIFICANT CHANGE UP (ref -2–2)
BASOPHILS # BLD AUTO: 0.07 K/UL — SIGNIFICANT CHANGE UP (ref 0–0.2)
BASOPHILS NFR BLD AUTO: 0.7 % — SIGNIFICANT CHANGE UP (ref 0–2)
BILIRUB SERPL-MCNC: 0.7 MG/DL — SIGNIFICANT CHANGE UP (ref 0.2–1.2)
BUN SERPL-MCNC: 16 MG/DL — SIGNIFICANT CHANGE UP (ref 7–23)
CA-I SERPL-SCNC: 1.14 MMOL/L — SIGNIFICANT CHANGE UP (ref 1.12–1.3)
CALCIUM SERPL-MCNC: 9.1 MG/DL — SIGNIFICANT CHANGE UP (ref 8.4–10.5)
CHLORIDE BLDV-SCNC: 106 MMOL/L — SIGNIFICANT CHANGE UP (ref 96–108)
CHLORIDE SERPL-SCNC: 101 MMOL/L — SIGNIFICANT CHANGE UP (ref 96–108)
CO2 BLDV-SCNC: 25 MMOL/L — SIGNIFICANT CHANGE UP (ref 22–30)
CO2 SERPL-SCNC: 22 MMOL/L — SIGNIFICANT CHANGE UP (ref 22–31)
CREAT SERPL-MCNC: 1.22 MG/DL — SIGNIFICANT CHANGE UP (ref 0.5–1.3)
EOSINOPHIL # BLD AUTO: 0.27 K/UL — SIGNIFICANT CHANGE UP (ref 0–0.5)
EOSINOPHIL NFR BLD AUTO: 2.9 % — SIGNIFICANT CHANGE UP (ref 0–6)
GAS PNL BLDV: 139 MMOL/L — SIGNIFICANT CHANGE UP (ref 135–145)
GAS PNL BLDV: SIGNIFICANT CHANGE UP
GAS PNL BLDV: SIGNIFICANT CHANGE UP
GLUCOSE BLDV-MCNC: 83 MG/DL — SIGNIFICANT CHANGE UP (ref 70–99)
GLUCOSE SERPL-MCNC: 81 MG/DL — SIGNIFICANT CHANGE UP (ref 70–99)
HCO3 BLDV-SCNC: 24 MMOL/L — SIGNIFICANT CHANGE UP (ref 21–29)
HCT VFR BLD CALC: 41.2 % — SIGNIFICANT CHANGE UP (ref 34.5–45)
HCT VFR BLDA CALC: 38 % — LOW (ref 39–50)
HGB BLD CALC-MCNC: 12.3 G/DL — SIGNIFICANT CHANGE UP (ref 11.5–15.5)
HGB BLD-MCNC: 12.1 G/DL — SIGNIFICANT CHANGE UP (ref 11.5–15.5)
IMM GRANULOCYTES NFR BLD AUTO: 0.2 % — SIGNIFICANT CHANGE UP (ref 0–1.5)
LACTATE BLDV-MCNC: 1.3 MMOL/L — SIGNIFICANT CHANGE UP (ref 0.7–2)
LYMPHOCYTES # BLD AUTO: 2.57 K/UL — SIGNIFICANT CHANGE UP (ref 1–3.3)
LYMPHOCYTES # BLD AUTO: 27.4 % — SIGNIFICANT CHANGE UP (ref 13–44)
MAGNESIUM SERPL-MCNC: 2 MG/DL — SIGNIFICANT CHANGE UP (ref 1.6–2.6)
MANUAL SMEAR VERIFICATION: SIGNIFICANT CHANGE UP
MCHC RBC-ENTMCNC: 18.4 PG — LOW (ref 27–34)
MCHC RBC-ENTMCNC: 29.4 GM/DL — LOW (ref 32–36)
MCV RBC AUTO: 62.6 FL — LOW (ref 80–100)
MICROCYTES BLD QL: SLIGHT — SIGNIFICANT CHANGE UP
MONOCYTES # BLD AUTO: 0.82 K/UL — SIGNIFICANT CHANGE UP (ref 0–0.9)
MONOCYTES NFR BLD AUTO: 8.8 % — SIGNIFICANT CHANGE UP (ref 2–14)
NEUTROPHILS # BLD AUTO: 5.62 K/UL — SIGNIFICANT CHANGE UP (ref 1.8–7.4)
NEUTROPHILS NFR BLD AUTO: 60 % — SIGNIFICANT CHANGE UP (ref 43–77)
NRBC # BLD: 0 /100 WBCS — SIGNIFICANT CHANGE UP (ref 0–0)
OTHER CELLS CSF MANUAL: 7 ML/DL — LOW (ref 18–22)
OVALOCYTES BLD QL SMEAR: SLIGHT — SIGNIFICANT CHANGE UP
PCO2 BLDV: 42 MMHG — SIGNIFICANT CHANGE UP (ref 35–50)
PH BLDV: 7.37 — SIGNIFICANT CHANGE UP (ref 7.35–7.45)
PHOSPHATE SERPL-MCNC: 4.3 MG/DL — SIGNIFICANT CHANGE UP (ref 2.5–4.5)
PLAT MORPH BLD: NORMAL — SIGNIFICANT CHANGE UP
PLATELET # BLD AUTO: 450 K/UL — HIGH (ref 150–400)
PLATELET COUNT - ESTIMATE: NORMAL — SIGNIFICANT CHANGE UP
PO2 BLDV: 30 MMHG — SIGNIFICANT CHANGE UP (ref 25–45)
POIKILOCYTOSIS BLD QL AUTO: SLIGHT — SIGNIFICANT CHANGE UP
POTASSIUM BLDV-SCNC: 3.4 MMOL/L — LOW (ref 3.5–5.3)
POTASSIUM SERPL-MCNC: 3.4 MMOL/L — LOW (ref 3.5–5.3)
POTASSIUM SERPL-SCNC: 3.4 MMOL/L — LOW (ref 3.5–5.3)
PROT SERPL-MCNC: 7.8 G/DL — SIGNIFICANT CHANGE UP (ref 6–8.3)
RBC # BLD: 6.58 M/UL — HIGH (ref 3.8–5.2)
RBC # FLD: 20 % — HIGH (ref 10.3–14.5)
RBC BLD AUTO: ABNORMAL
SAO2 % BLDV: 41 % — LOW (ref 67–88)
SARS-COV-2 IGG SERPL QL IA: NEGATIVE — SIGNIFICANT CHANGE UP
SARS-COV-2 IGM SERPL IA-ACNC: 0.09 INDEX — SIGNIFICANT CHANGE UP
SODIUM SERPL-SCNC: 137 MMOL/L — SIGNIFICANT CHANGE UP (ref 135–145)
WBC # BLD: 9.37 K/UL — SIGNIFICANT CHANGE UP (ref 3.8–10.5)
WBC # FLD AUTO: 9.37 K/UL — SIGNIFICANT CHANGE UP (ref 3.8–10.5)

## 2020-07-22 PROCEDURE — 99233 SBSQ HOSP IP/OBS HIGH 50: CPT | Mod: GC

## 2020-07-22 PROCEDURE — 99233 SBSQ HOSP IP/OBS HIGH 50: CPT

## 2020-07-22 RX ORDER — POTASSIUM CHLORIDE 20 MEQ
40 PACKET (EA) ORAL ONCE
Refills: 0 | Status: COMPLETED | OUTPATIENT
Start: 2020-07-22 | End: 2020-07-22

## 2020-07-22 RX ORDER — LEVALBUTEROL 1.25 MG/.5ML
0.63 SOLUTION, CONCENTRATE RESPIRATORY (INHALATION) EVERY 4 HOURS
Refills: 0 | Status: DISCONTINUED | OUTPATIENT
Start: 2020-07-22 | End: 2020-07-29

## 2020-07-22 RX ADMIN — Medication 500 MICROGRAM(S): at 10:14

## 2020-07-22 RX ADMIN — Medication 40 MILLIGRAM(S): at 08:49

## 2020-07-22 RX ADMIN — Medication 500 MICROGRAM(S): at 03:02

## 2020-07-22 RX ADMIN — PANTOPRAZOLE SODIUM 40 MILLIGRAM(S): 20 TABLET, DELAYED RELEASE ORAL at 05:54

## 2020-07-22 RX ADMIN — Medication 40 MILLIGRAM(S): at 23:07

## 2020-07-22 RX ADMIN — Medication 10 MILLIGRAM(S): at 05:54

## 2020-07-22 RX ADMIN — Medication 40 MILLIEQUIVALENT(S): at 08:49

## 2020-07-22 NOTE — PROGRESS NOTE ADULT - PROBLEM SELECTOR PLAN 1
- Pt on Remodulin at home.   - C/w this for now.   - She seems to be responding to diuretics at this time. Continue with lasix 40 IV bid.   - goal to keep net negative 1000 cc over next 24 hours.   - start spironolactone 25 QD  - Given that PA pressures are much higher on echo compared to previous echo will plan for assessment via RHC and LHC (only for LVEDP).   - Hold  tonight in preparation. (will d/c order for now)  - pulm recs appreciated.

## 2020-07-22 NOTE — PROGRESS NOTE ADULT - ASSESSMENT
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 Presentation possibly due to RV volume overload, will trial diuresis and reassess improvement of symptoms.    Pertinent Studies:  RHC 5/4/2018: HR 88 bpm, RAP 4, PAP 88/31 (51), CWP 6, PA sat 61% for CO/CI 5.7/28, PVR 8.6 MONACO  TTE 11/2019: normal LV systolic function. TAJ, RV enlargement with decreased RV systolic function. Mild TR. PaSP approximately 55    TTE7/2020: RVSP > 100

## 2020-07-22 NOTE — PROGRESS NOTE ADULT - SUBJECTIVE AND OBJECTIVE BOX
ROXI MAIRA  40y  Female    Michelle Cannon MD PGY1 [phone]  Pt reports feeling "no change" from last night's symptoms, said she still has chest tightness, shortness of breath, and used her atrovent neutralizer at 9pm and 2 am. She still has occasional cough with clear "mucus" sputum, she reports no tachycardia, palpitations reduced from baseline, no changes in vision, HA, fever/chills, nausea/vomiting. Pt states she currently does not have diarrhea, that she experiences it intermittently, and that her stools are well-formed and not malodorous.      Subjective    O/N Events: No acute events overnight    Meds    MEDICATIONS  (STANDING):  ALBUTerol    90 MICROgram(s) HFA Inhaler 1 Puff(s) Inhalation every 4 hours  furosemide   Injectable 40 milliGRAM(s) IV Push two times a day  pantoprazole    Tablet 40 milliGRAM(s) Oral before breakfast  predniSONE   Tablet 10 milliGRAM(s) Oral daily  Remodulin (Treprostinil) vial 50 milliGRAM(s),IV Solution 20 milliLiter(s) 50 milliGRAM(s) (17 NANOGram(s)/kG/Min) IV Continuous <Continuous>  rivaroxaban 10 milliGRAM(s) Oral with dinner  tiotropium 18 MICROgram(s) Capsule 1 Capsule(s) Inhalation daily    MEDICATIONS  (PRN):  ipratropium    for Nebulization 500 MICROGram(s) Nebulizer every 6 hours PRN Shortness of Breath        Vital Signs Last 24 Hrs  T(C): 36.7 (22 Jul 2020 13:30), Max: 36.8 (21 Jul 2020 19:05)  T(F): 98 (22 Jul 2020 13:30), Max: 98.2 (21 Jul 2020 19:05)  HR: 97 (22 Jul 2020 13:30) (96 - 114)  BP: 106/67 (22 Jul 2020 13:30) (94/66 - 130/85)  BP(mean): 77 (22 Jul 2020 07:33) (77 - 77)  RR: 18 (22 Jul 2020 13:30) (18 - 20)  SpO2: 93% (22 Jul 2020 13:30) (92% - 98%)    Physical Exam: PHYSICAL EXAM:  GENERAL: NAD, well-developed  HEAD:  Atraumatic, Normocephalic  EYES: EOMI, conjunctiva and sclera clear  NECK: Supple, No JVD  CHEST/LUNG: occasional crackles at base of lungs; mild wheezing b/l  HEART: Regular rate and rhythm; , rubs, or gallops  ABDOMEN: Soft, Nontender, mildly distended; Bowel sounds present  EXTREMITIES:  well-perfused, No clubbing, cyanosis, 1+ b/l LE edema  PSYCH: AAOx3  NEUROLOGY: non-focal SKIN: Mild rash at ekg lead tape site, left thigh skin tag, round pedunculated, no erythema or induration (1 in. diameter)	      Consultant(s) Notes Reviewed:  [x ] YES  [ ] NO  Care Discussed with Consultants/Other Providers [ x] YES  [ ] NO      07-22    137  |  101  |  16  ----------------------------<  81  3.4<L>   |  22  |  1.22  07-21    136  |  106  |  17  ----------------------------<  86  4.2   |  15<L>  |  1.07    Ca    9.1      22 Jul 2020 05:24  Ca    9.0      21 Jul 2020 10:55  Phos  4.3     07-22  Mg     2.0     07-22    TPro  7.8  /  Alb  4.6  /  TBili  0.7  /  DBili  x   /  AST  14  /  ALT  8<L>  /  AlkPhos  61  07-22  TPro  7.2  /  Alb  4.3  /  TBili  0.6  /  DBili  x   /  AST  19  /  ALT  8<L>  /  AlkPhos  56  07-21    Magnesium, Serum: 2.0 mg/dL (07-22-20 @ 05:24)    Phosphorus Level, Serum: 4.3 mg/dL (07-22-20 @ 05:24)      PT/INR - ( 21 Jul 2020 10:55 )   PT: 12.4 sec;   INR: 1.04 ratio         PTT - ( 21 Jul 2020 10:55 )  PTT:40.6 sec                                        12.1   9.37  )-----------( 450      ( 22 Jul 2020 05:24 )             41.2                         11.1   9.91  )-----------( 428      ( 21 Jul 2020 10:55 )             38.7     LIVER FUNCTIONS - ( 22 Jul 2020 05:24 )  Alb: 4.6 g/dL / Pro: 7.8 g/dL / ALK PHOS: 61 U/L / ALT: 8 U/L / AST: 14 U/L / GGT: x           CAPILLARY BLOOD GLUCOSE        Blood Gas Source Venous: Venous (07-22-20 @ 05:27)        LIVER FUNCTIONS - ( 22 Jul 2020 05:24 )  Alb: 4.6 g/dL / Pro: 7.8 g/dL / ALK PHOS: 61 U/L / ALT: 8 U/L / AST: 14 U/L / GGT: x           Microbiology MARIAPINGA  40y  Female    Michelle Cannon MD PGY1 [phone]    Subjective  Pt was seen at bedside as she was changing out her remodulin dose. Pt reports feeling "no change" from last night's symptoms, said she still has chest tightness, shortness of breath, and used her atrovent neutralizer at 9pm and 2 am. She still has occasional cough with clear "mucus" sputum, she reports no tachycardia, palpitations reduced from baseline, no changes in vision, HA, fever/chills, nausea/vomiting. Pt states she currently does not have diarrhea, that she experiences it intermittently, and that her stools are well-formed and not malodorous.      O/N Events: No acute events overnight    Meds    MEDICATIONS  (STANDING):  ALBUTerol    90 MICROgram(s) HFA Inhaler 1 Puff(s) Inhalation every 4 hours  furosemide   Injectable 40 milliGRAM(s) IV Push two times a day  pantoprazole    Tablet 40 milliGRAM(s) Oral before breakfast  predniSONE   Tablet 10 milliGRAM(s) Oral daily  Remodulin (Treprostinil) vial 50 milliGRAM(s),IV Solution 20 milliLiter(s) 50 milliGRAM(s) (17 NANOGram(s)/kG/Min) IV Continuous <Continuous>  rivaroxaban 10 milliGRAM(s) Oral with dinner  tiotropium 18 MICROgram(s) Capsule 1 Capsule(s) Inhalation daily    MEDICATIONS  (PRN):  ipratropium    for Nebulization 500 MICROGram(s) Nebulizer every 6 hours PRN Shortness of Breath        Vital Signs Last 24 Hrs  T(C): 36.7 (22 Jul 2020 13:30), Max: 36.8 (21 Jul 2020 19:05)  T(F): 98 (22 Jul 2020 13:30), Max: 98.2 (21 Jul 2020 19:05)  HR: 97 (22 Jul 2020 13:30) (96 - 114)  BP: 106/67 (22 Jul 2020 13:30) (94/66 - 130/85)  BP(mean): 77 (22 Jul 2020 07:33) (77 - 77)  RR: 18 (22 Jul 2020 13:30) (18 - 20)  SpO2: 93% (22 Jul 2020 13:30) (92% - 98%)    Physical Exam: PHYSICAL EXAM:  GENERAL: NAD, well-developed  HEAD:  Atraumatic, Normocephalic  EYES: EOMI, conjunctiva and sclera clear  NECK: Supple, No JVD  CHEST/LUNG: occasional crackles at base of lungs; mild wheezing b/l  HEART: Regular rate and rhythm; , rubs, or gallops  ABDOMEN: Soft, Nontender, mildly distended; Bowel sounds present  EXTREMITIES:  well-perfused, No clubbing, cyanosis, 1+ b/l LE edema  PSYCH: AAOx3  NEUROLOGY: non-focal SKIN: Mild rash at ekg lead tape site, left thigh skin tag, round pedunculated, no erythema or induration (1 in. diameter)	      Consultant(s) Notes Reviewed:  [x ] YES  [ ] NO  Care Discussed with Consultants/Other Providers [ x] YES  [ ] NO      07-22    137  |  101  |  16  ----------------------------<  81  3.4<L>   |  22  |  1.22  07-21    136  |  106  |  17  ----------------------------<  86  4.2   |  15<L>  |  1.07    Ca    9.1      22 Jul 2020 05:24  Ca    9.0      21 Jul 2020 10:55  Phos  4.3     07-22  Mg     2.0     07-22    TPro  7.8  /  Alb  4.6  /  TBili  0.7  /  DBili  x   /  AST  14  /  ALT  8<L>  /  AlkPhos  61  07-22  TPro  7.2  /  Alb  4.3  /  TBili  0.6  /  DBili  x   /  AST  19  /  ALT  8<L>  /  AlkPhos  56  07-21    Magnesium, Serum: 2.0 mg/dL (07-22-20 @ 05:24)    Phosphorus Level, Serum: 4.3 mg/dL (07-22-20 @ 05:24)      PT/INR - ( 21 Jul 2020 10:55 )   PT: 12.4 sec;   INR: 1.04 ratio         PTT - ( 21 Jul 2020 10:55 )  PTT:40.6 sec                                        12.1   9.37  )-----------( 450      ( 22 Jul 2020 05:24 )             41.2                         11.1   9.91  )-----------( 428      ( 21 Jul 2020 10:55 )             38.7     LIVER FUNCTIONS - ( 22 Jul 2020 05:24 )  Alb: 4.6 g/dL / Pro: 7.8 g/dL / ALK PHOS: 61 U/L / ALT: 8 U/L / AST: 14 U/L / GGT: x           CAPILLARY BLOOD GLUCOSE        Blood Gas Source Venous: Venous (07-22-20 @ 05:27)        LIVER FUNCTIONS - ( 22 Jul 2020 05:24 )  Alb: 4.6 g/dL / Pro: 7.8 g/dL / ALK PHOS: 61 U/L / ALT: 8 U/L / AST: 14 U/L / GGT: x           Microbiology

## 2020-07-22 NOTE — PROGRESS NOTE ADULT - SUBJECTIVE AND OBJECTIVE BOX
Interval Events:      REVIEW OF SYSTEMS:  Constitutional: [ ] negative [ ] fevers [ ] chills [ ] weight loss [ ] weight gain  HEENT: [ ] negative [ ] dry eyes [ ] eye irritation [ ] postnasal drip [ ] nasal congestion  CV: [ ] negative  [ ] chest pain [ ] orthopnea [ ] palpitations [ ] murmur  Resp: [ ] negative [ ] cough [ ] shortness of breath [ ] dyspnea [ ] wheezing [ ] sputum [ ] hemoptysis  GI: [ ] negative [ ] nausea [ ] vomiting [ ] diarrhea [ ] constipation [ ] abd pain [ ] dysphagia   : [ ] negative [ ] dysuria [ ] nocturia [ ] hematuria [ ] increased urinary frequency  Musculoskeletal: [ ] negative [ ] back pain [ ] myalgias [ ] arthralgias [ ] fracture  Skin: [ ] negative [ ] rash [ ] itch  Neurological: [ ] negative [ ] headache [ ] dizziness [ ] syncope [ ] weakness [ ] numbness  Psychiatric: [ ] negative [ ] anxiety [ ] depression  Endocrine: [ ] negative [ ] diabetes [ ] thyroid problem  Hematologic/Lymphatic: [ ] negative [ ] anemia [ ] bleeding problem  Allergic/Immunologic: [ ] negative [ ] itchy eyes [ ] nasal discharge [ ] hives [ ] angioedema  [ ] All other systems negative  [ ] Unable to assess ROS because ________    OBJECTIVE:  ICU Vital Signs Last 24 Hrs  T(C): 36.8 (22 Jul 2020 05:12), Max: 36.8 (21 Jul 2020 10:48)  T(F): 98.2 (22 Jul 2020 05:12), Max: 98.2 (21 Jul 2020 10:48)  HR: 98 (22 Jul 2020 07:33) (96 - 114)  BP: 95/55 (22 Jul 2020 10:15) (94/66 - 135/81)  BP(mean): 77 (22 Jul 2020 07:33) (77 - 77)  ABP: --  ABP(mean): --  RR: 18 (22 Jul 2020 05:12) (18 - 20)  SpO2: 93% (22 Jul 2020 05:12) (92% - 98%)        07-21 @ 07:01  -  07-22 @ 07:00  --------------------------------------------------------  IN: 240 mL / OUT: 600 mL / NET: -360 mL    07-22 @ 07:01  -  07-22 @ 10:17  --------------------------------------------------------  IN: 240 mL / OUT: 0 mL / NET: 240 mL      CAPILLARY BLOOD GLUCOSE          PHYSICAL EXAM:  General:   HEENT:   Lymph Nodes:  Neck:   Respiratory:   Cardiovascular:   Abdomen:   Extremities:   Skin:   Neurological:  Psychiatry:    HOSPITAL MEDICATIONS:  MEDICATIONS  (STANDING):  ALBUTerol    90 MICROgram(s) HFA Inhaler 1 Puff(s) Inhalation every 4 hours  furosemide   Injectable 40 milliGRAM(s) IV Push two times a day  pantoprazole    Tablet 40 milliGRAM(s) Oral before breakfast  predniSONE   Tablet 10 milliGRAM(s) Oral daily  Remodulin (Treprostinil) vial 50 milliGRAM(s),IV Solution 20 milliLiter(s) 50 milliGRAM(s) (17 NANOGram(s)/kG/Min) IV Continuous <Continuous>  rivaroxaban 10 milliGRAM(s) Oral with dinner  tiotropium 18 MICROgram(s) Capsule 1 Capsule(s) Inhalation daily    MEDICATIONS  (PRN):  ipratropium    for Nebulization 500 MICROGram(s) Nebulizer every 6 hours PRN Shortness of Breath      LABS:                        12.1   9.37  )-----------( 450      ( 22 Jul 2020 05:24 )             41.2     Hgb Trend: 12.1<--, 11.1<--  07-22    137  |  101  |  16  ----------------------------<  81  3.4<L>   |  22  |  1.22    Ca    9.1      22 Jul 2020 05:24  Phos  4.3     07-22  Mg     2.0     07-22    TPro  7.8  /  Alb  4.6  /  TBili  0.7  /  DBili  x   /  AST  14  /  ALT  8<L>  /  AlkPhos  61  07-22    Creatinine Trend: 1.22<--, 1.07<--  PT/INR - ( 21 Jul 2020 10:55 )   PT: 12.4 sec;   INR: 1.04 ratio         PTT - ( 21 Jul 2020 10:55 )  PTT:40.6 sec      Venous Blood Gas:  07-22 @ 05:27  7.37/42/30/24/41  VBG Lactate: 1.3  Venous Blood Gas:  07-21 @ 20:29  7.40/38/20/23/19  VBG Lactate: 1.4      MICROBIOLOGY:       RADIOLOGY:  [ ] Reviewed and interpreted by me    PULMONARY FUNCTION TESTS:    EKG: Interval Events:  S/p 3 x IV lasix 40 since admission with charted net negative 360cc yesterday (incomplete 24hour iso recent admission). Reports this morning still with shortness of breath and exertional dyspnea. Denies further exertional lightheadedness during hospitalization, though with worsening tachycardia on ambulation. Also reporting persistent cough with associated chest pain. Reports reduction in leg swelling    REVIEW OF SYSTEMS:  Constitutional: [x] negative [ ] fevers [ ] chills [ ] weight loss [ ] weight gain  HEENT: [ ] negative [ ] dry eyes [ ] eye irritation [ ] postnasal drip [ ] nasal congestion  CV: [ ] negative  [ ] chest pain [ ] orthopnea [ ] palpitations [ ] murmur  Resp: [ ] negative [x] cough [x] shortness of breath [ ] dyspnea [ ] wheezing [ ] sputum [ ] hemoptysis  GI: [ ] negative [ ] nausea [ ] vomiting [ ] diarrhea [ ] constipation [ ] abd pain [ ] dysphagia   : [ ] negative [ ] dysuria [ ] nocturia [ ] hematuria [ ] increased urinary frequency  Musculoskeletal: [ ] negative [ ] back pain [ ] myalgias [ ] arthralgias [ ] fracture  Skin: [ ] negative [ ] rash [ ] itch  Neurological: [ ] negative [ ] headache [ ] dizziness [ ] syncope [ ] weakness [ ] numbness  Psychiatric: [ ] negative [ ] anxiety [ ] depression  Endocrine: [ ] negative [ ] diabetes [ ] thyroid problem  Hematologic/Lymphatic: [ ] negative [ ] anemia [ ] bleeding problem  Allergic/Immunologic: [ ] negative [ ] itchy eyes [ ] nasal discharge [ ] hives [ ] angioedema  [x] All other systems negative  [ ] Unable to assess ROS because ________    OBJECTIVE:  ICU Vital Signs Last 24 Hrs  T(C): 36.8 (22 Jul 2020 05:12), Max: 36.8 (21 Jul 2020 10:48)  T(F): 98.2 (22 Jul 2020 05:12), Max: 98.2 (21 Jul 2020 10:48)  HR: 98 (22 Jul 2020 07:33) (96 - 114)  BP: 95/55 (22 Jul 2020 10:15) (94/66 - 135/81)  BP(mean): 77 (22 Jul 2020 07:33) (77 - 77)  ABP: --  ABP(mean): --  RR: 18 (22 Jul 2020 05:12) (18 - 20)  SpO2: 93% (22 Jul 2020 05:12) (92% - 98%)        07-21 @ 07:01 - 07-22 @ 07:00  --------------------------------------------------------  IN: 240 mL / OUT: 600 mL / NET: -360 mL    07-22 @ 07:01 - 07-22 @ 10:17  --------------------------------------------------------  IN: 240 mL / OUT: 0 mL / NET: 240 mL      CAPILLARY BLOOD GLUCOSE          PHYSICAL EXAM:  GEN: Middle aged female, NAD  HEENT: EOMI, MMM  CV: Tachycardia w/o mrg  PULM: CTAB  ABD: Soft, NT, ND, no rebound or guarding  EXT: [facility] MEDICATIONS:  MEDICATIONS  (STANDING):  ALBUTerol    90 MICROgram(s) HFA Inhaler 1 Puff(s) Inhalation every 4 hours  furosemide   Injectable 40 milliGRAM(s) IV Push two times a day  pantoprazole    Tablet 40 milliGRAM(s) Oral before breakfast  predniSONE   Tablet 10 milliGRAM(s) Oral daily  Remodulin (Treprostinil) vial 50 milliGRAM(s),IV Solution 20 milliLiter(s) 50 milliGRAM(s) (17 NANOGram(s)/kG/Min) IV Continuous <Continuous>  rivaroxaban 10 milliGRAM(s) Oral with dinner  tiotropium 18 MICROgram(s) Capsule 1 Capsule(s) Inhalation daily    MEDICATIONS  (PRN):  ipratropium    for Nebulization 500 MICROGram(s) Nebulizer every 6 hours PRN Shortness of Breath      LABS:                        12.1   9.37  )-----------( 450      ( 22 Jul 2020 05:24 )             41.2     Hgb Trend: 12.1<--, 11.1<--  07-22    137  |  101  |  16  ----------------------------<  81  3.4<L>   |  22  |  1.22    Ca    9.1      22 Jul 2020 05:24  Phos  4.3     07-22  Mg     2.0     07-22    TPro  7.8  /  Alb  4.6  /  TBili  0.7  /  DBili  x   /  AST  14  /  ALT  8<L>  /  AlkPhos  61  07-22    Creatinine Trend: 1.22<--, 1.07<--  PT/INR - ( 21 Jul 2020 10:55 )   PT: 12.4 sec;   INR: 1.04 ratio         PTT - ( 21 Jul 2020 10:55 )  PTT:40.6 sec      Venous Blood Gas:  07-22 @ 05:27  7.37/42/30/24/41  VBG Lactate: 1.3  Venous Blood Gas:  07-21 @ 20:29  7.40/38/20/23/19  VBG Lactate: 1.4      MICROBIOLOGY:       RADIOLOGY:  [ ] Reviewed and interpreted by me    PULMONARY FUNCTION TESTS:    EKG:

## 2020-07-22 NOTE — PROGRESS NOTE ADULT - ATTENDING COMMENTS
Patient seen and examined, history as noted above.  Data and imaging personally reviewed by me.  Patient with history of asthma presents with worsening pulmonary hypertension - group I vs group IV,  Markedly enlarged RV noted without overt signs of peripheral volume overload. Currently receiving diuretics.  Plan is for right heart cath tomorrow.  May require IV pulmonary vasodilator therapy depending on cath results.  Agree with plan as outlined above.

## 2020-07-22 NOTE — PROGRESS NOTE ADULT - ASSESSMENT
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     #SOB/pHTN  - suspect significant component of exacerbation 2/2 fluid overload. Symptoms of exertional lightheadedness and imaging w/ significant RV dilation and decreased LV are concerning for signs of LV compromise iso RV dysfunction. w/o troponemia and only mildly elevated BNP, but may be falsely low iso body habitus. No signs of acute infection. Possible component of worsening chronic pulmonary disease.   - c/w diuresis to maintain net negative 500 to 1000cc  - recommend close monitoring of hemodynamics, PLEASE AVOID EXCESSIVE IV FLUIDS AS MAY WORSEN RV DILATION AND FURTHER COMPROMISE LV FUNCTION/CARDIAC OUTPUT  - c/w currently dose of remodulin 17  - c/w prednisone 10  - prn ipratropium, okay to defer albuterol iso tachycardia  - please be cautious with IV beta blocker as may worsen asthma and, more importantly, worsen LV function  - okay to continue nocturnal O2  - recommend telemetry monitoring  - low threshold for uptriage to ICU for ionotrope assisted diuresis if worsening signs of end ogan dysfunction or hypoperfusion/hypotension   - pulmonary to follow    Demarco Ortiz MD  PGY-4  PCCM Fellow  Pager [phone]    Note unofficial until cosigned by attending

## 2020-07-22 NOTE — PROGRESS NOTE ADULT - PROBLEM SELECTOR PLAN 1
Pt has known RHF d/t pulmonary HTN. Pt c/o SOB for 1 month possibly 2/2 fluid overload in the setting of recent hospitalization w/fluids given. Pt at baseline HR and BP but given SOB and lightheadedness on exertion, TTE with RV enlargement and LV dysfunction possibly 2/2 to RHF. Elevated BNP with troponins 6<-7<-10<-9.   Plan - Pulm and Cardio recs appreciated greatly  - initial 40mg Lasix IV in ED;  - STRICT Is and Os with goal net negative 500 to 1000cc daily  close hemodynamic monitoring  - avoid excess IV fluids, may worsen RV dilation and further compromise LV function/cardiac output  - VBG for evaluation of end organ hypoperfusion  - re-initiate 10mg prednisone daily, no signs of acute asthma exacerbation  - prn ipratropium  - okay to defer albuterol iso tachycardia  - avoid IV beta blocker  - Close f/u renal function in setting of dye load for CT.  - after initial lasix, start 40mg lasix bid 68

## 2020-07-22 NOTE — PROVIDER CONTACT NOTE (OTHER) - ACTION/TREATMENT ORDERED:
Hold Lasix. Potassium to be supplemented. No further orders at this time. Continue to monitor the pt.

## 2020-07-22 NOTE — PROGRESS NOTE ADULT - ATTENDING COMMENTS
good response to IV diuretics, but no change in subjective symptoms.   meds: remodulin 17ng/, xarelto 10, lasix 40 IV bid, PPI, pred 10 (added), atrovent.   , 90/-100/, standing 196lbs  -360.   TTE 7.21: LA normal, RV enlarged with decrease function. LVEDD 2.6, LVEF 60, severe Tr, RVSP 112mHg,   CT angio: enlarged mPA, mosaic attenuation of the lungs suggestive of PTH.   new nodule in left breast. No PE.   07-22    137  |  101  |  16  ----------------------------<  81  3.4<L>   |  22  |  1.22  Ca    9.1      22 Jul 2020 05:24  Phos  4.3     07-22  Mg     2.0     07-22  TPro  7.8  /  Alb  4.6  /  TBili  0.7  /  DBili  x   /  AST  14  /  ALT  8<L>  /  AlkPhos  61  07-22                        12.1   9.37  )-----------( 450      ( 22 Jul 2020 05:24 )             41.2   responding to diuretics would continue for now.  TTE shows calculated PA pressures double measured 11. 2019.   appreciate pulmonary input. good response to IV diuretics, but no change in subjective symptoms.   meds: remodulin 17ng/, xarelto 10, lasix 40 IV bid, PPI, pred 10 (added), atrovent.   , 90/-100/, standing 196lbs  -360.   TTE 7.21: LA normal, RV enlarged with decrease function. LVEDD 2.6, LVEF 60, severe Tr, RVSP 112mHg,   CT angio: enlarged mPA, mosaic attenuation of the lungs suggestive of PTH.   new nodule in left breast. No PE.   07-22    137  |  101  |  16  ----------------------------<  81  3.4<L>   |  22  |  1.22  Ca    9.1      22 Jul 2020 05:24  Phos  4.3     07-22  Mg     2.0     07-22  TPro  7.8  /  Alb  4.6  /  TBili  0.7  /  DBili  x   /  AST  14  /  ALT  8<L>  /  AlkPhos  61  07-22                        12.1   9.37  )-----------( 450      ( 22 Jul 2020 05:24 )             41.2   responding to diuretics would continue for now.  TTE shows calculated PA pressures double measured 11. 2019. discussed with Dr Mas, will arrange for R/L cath (LVEDP). Patient may need transition to IV remodulin and more urgent evaluation for transplant.   William Harper

## 2020-07-22 NOTE — PROGRESS NOTE ADULT - PROBLEM SELECTOR PLAN 2
Pt c/o SOB for 1 month possibly 2/2 fluid overload in the setting of recent hospitalization w/fluids given. Pt at baseline HR and BP but given SOB and lightheadedness on exertion, TTE with RV enlargement and LV dysfunction possibly 2/2 to RHF. Elevated BNP with troponins 6<-7<-10<-9.   Plan - Pulm and Cardio recs appreciated greatly  - initial 40mg Lasix IV in ED;  - STRICT Is and Os with goal net negative 500 to 1000cc daily  close hemodynamic monitoring  - avoid excess IV fluids, may worsen RV dilation and further compromise LV function/cardiac output  - VBG for evaluation of end organ hypoperfusion  - re-initiate 10mg prednisone daily, no signs of acute asthma exacerbation  - prn ipratropium  - okay to defer albuterol iso tachycardia  - avoid IV beta blocker  - Close f/u renal function in setting of dye load for CT. Pt c/o SOB for 1 month possibly 2/2 fluid overload in the setting of recent hospitalization w/fluids given. Pt at baseline HR and BP but given SOB and lightheadedness on exertion, TTE with RV enlargement and LV dysfunction possibly 2/2 to RHF. Elevated BNP with troponins 6<-7<-10<-9.   Plan - Pulm and Cardio recs appreciated greatly  - initial 40mg Lasix IV in ED;  - STRICT Is and Os with goal net negative 500 to 1000cc daily  close hemodynamic monitoring  - avoid excess IV fluids, may worsen RV dilation and further compromise LV function/cardiac output  - VBG for evaluation of end organ hypoperfusion  - re-initiate 10mg prednisone daily, no signs of acute asthma exacerbation  - prn ipratropium  - okay to defer albuterol iso tachycardia  - avoid IV beta blocker  - Close f/u renal function in setting of dye load for CT.  - Continue home remodulin Pt c/o SOB for 1 month possibly 2/2 fluid overload in the setting of recent hospitalization w/fluids given. Pt at baseline HR and BP but given SOB and lightheadedness on exertion, TTE with RV enlargement and LV dysfunction possibly 2/2 to RHF. Elevated BNP with troponins 6<-7<-10<-9.   Plan - Pulm and Cardio recs appreciated greatly  - initial 40mg Lasix IV in ED;  - STRICT Is and Os with goal net negative 500 to 1000cc daily  close hemodynamic monitoring  - avoid excess IV fluids, may worsen RV dilation and further compromise LV function/cardiac output  - VBG for evaluation of end organ hypoperfusion  - re-initiate 10mg prednisone daily, no signs of acute asthma exacerbation  - prn ipratropium  - okay to defer albuterol iso tachycardia  - avoid IV beta blocker  - Close f/u renal function in setting of dye load for CT.  - Continue home remodulin 17ng/kg/ml

## 2020-07-22 NOTE — PROGRESS NOTE ADULT - ATTENDING COMMENTS
Patient seen and examined with the resident and team at bedside, Labs and vital are reviewed. I agree with above unless noted below    A/P; 59 Y/O/F with  PMHx  class I pHTN ,currently on Remodulin, PE on half dose xarelto, , bradycardia s/p , AVRNT s/p ablation (5/2020), asthma, chronic right sided systolic heart failure presented with progressive dyspnia, worsening cough with clear sputum, p/e with tachycardia, and occasional bibasilar crackles been admitted with Acute on chronic Right sided systolic heart failure in a setting P HTN and CTA evidence of pulmonary artry dilatation   continue IV Lasix 40 mg BID, continue Remodulin infusion, home medications including Xarelto and  low dose prednisone   pulmonary note appreciated   for right hear cath tomorrow

## 2020-07-22 NOTE — PROGRESS NOTE ADULT - SUBJECTIVE AND OBJECTIVE BOX
Patient seen and examined at bedside.    Overnight Events:       REVIEW OF SYSTEMS:  Constitutional:     [ ] negative [ ] fevers [ ] chills [ ] weight loss [ ] weight gain  HEENT:                  [ ] negative [ ] dry eyes [ ] eye irritation [ ] postnasal drip [ ] nasal congestion  CV:                         [ ] negative  [ ] chest pain [ ] orthopnea [ ] palpitations [ ] murmur  Resp:                     [ ] negative [ ] cough [ ] shortness of breath [ ] dyspnea [ ] wheezing [ ] sputum [ ]hemoptysis  GI:                          [ ] negative [ ] nausea [ ] vomiting [ ] diarrhea [ ] constipation [ ] abd pain [ ] dysphagia   :                        [ ] negative [ ] dysuria [ ] nocturia [ ] hematuria [ ] increased urinary frequency  Musculoskeletal: [ ] negative [ ] back pain [ ] myalgias [ ] arthralgias [ ] fracture  Skin:                       [ ] negative [ ] rash [ ] itch  Neurological:        [ ] negative [ ] headache [ ] dizziness [ ] syncope [ ] weakness [ ] numbness  Psychiatric:           [ ] negative [ ] anxiety [ ] depression  Endocrine:            [ ] negative [ ] diabetes [ ] thyroid problem  Heme/Lymph:      [ ] negative [ ] anemia [ ] bleeding problem  Allergic/Immune: [ ] negative [ ] itchy eyes [ ] nasal discharge [ ] hives [ ] angioedema    [ ] All other systems negative  [ ] Unable to assess ROS due to    Current Meds:  ALBUTerol    90 MICROgram(s) HFA Inhaler 1 Puff(s) Inhalation every 4 hours  furosemide   Injectable 40 milliGRAM(s) IV Push two times a day  ipratropium    for Nebulization 500 MICROGram(s) Nebulizer every 6 hours PRN  pantoprazole    Tablet 40 milliGRAM(s) Oral before breakfast  predniSONE   Tablet 10 milliGRAM(s) Oral daily  Remodulin (Treprostinil) vial 50 milliGRAM(s),IV Solution 20 milliLiter(s) 50 milliGRAM(s) IV Continuous <Continuous>  rivaroxaban 10 milliGRAM(s) Oral with dinner  tiotropium 18 MICROgram(s) Capsule 1 Capsule(s) Inhalation daily      PAST MEDICAL & SURGICAL HISTORY:  Right heart failure  Corneal disorder  Sinusitis  PE (pulmonary thromboembolism): on Xarelto 10 mg daily  Asthma: On home O2 nightly at 2L via NC  Pulmonary embolism  Pulmonary hypertension  Anemia  Tachycardia  Pacemaker  History of tubal ligation      Vitals:  T(F): 98.2 (07-22), Max: 98.2 (07-21)  HR: 98 (07-22) (96 - 114)  BP: 95/55 (07-22) (94/66 - 130/85)  RR: 18 (07-22)  SpO2: 93% (07-22)  I&O's Summary    21 Jul 2020 07:01  -  22 Jul 2020 07:00  --------------------------------------------------------  IN: 240 mL / OUT: 600 mL / NET: -360 mL    22 Jul 2020 07:01  -  22 Jul 2020 12:12  --------------------------------------------------------  IN: 240 mL / OUT: 0 mL / NET: 240 mL        Physical Exam:  Appearance: No acute distress; well appearing  Eyes: PERRL, EOMI, pink conjunctiva  HENT: Normal oral mucosa  Cardiovascular: RRR, S1, S2, no murmurs, rubs, or gallops; no edema; no JVD  Respiratory: Clear to auscultation bilaterally  Gastrointestinal: soft, non-tender, non-distended with normal bowel sounds  Musculoskeletal: No clubbing; no joint deformity   Neurologic: Non-focal  Lymphatic: No lymphadenopathy  Psychiatry: AAOx3, mood & affect appropriate  Skin: No rashes, ecchymoses, or cyanosis                          12.1   9.37  )-----------( 450      ( 22 Jul 2020 05:24 )             41.2     07-22    137  |  101  |  16  ----------------------------<  81  3.4<L>   |  22  |  1.22    Ca    9.1      22 Jul 2020 05:24  Phos  4.3     07-22  Mg     2.0     07-22    TPro  7.8  /  Alb  4.6  /  TBili  0.7  /  DBili  x   /  AST  14  /  ALT  8<L>  /  AlkPhos  61  07-22    PT/INR - ( 21 Jul 2020 10:55 )   PT: 12.4 sec;   INR: 1.04 ratio         PTT - ( 21 Jul 2020 10:55 )  PTT:40.6 sec      Serum Pro-Brain Natriuretic Peptide: 847 pg/mL (07-21 @ 10:55) Patient seen and examined at bedside.    Overnight Events: Pt states that she still feels SOB. She does feel like her bloating has improved, however, her SOB is still present.       REVIEW OF SYSTEMS:  Constitutional:     [ ] negative [ ] fevers [ ] chills [ ] weight loss [ ] weight gain  HEENT:                  [ ] negative [ ] dry eyes [ ] eye irritation [ ] postnasal drip [ ] nasal congestion  CV:                         [ ] negative  [ ] chest pain [ ] orthopnea [ ] palpitations [ ] murmur  Resp:                     [ ] negative [ ] cough [x ] shortness of breath [ ] dyspnea [ ] wheezing [ ] sputum [ ]hemoptysis  GI:                          [ ] negative [ ] nausea [ ] vomiting [ ] diarrhea [ ] constipation [ ] abd pain [ ] dysphagia   :                        [ ] negative [ ] dysuria [ ] nocturia [ ] hematuria [ ] increased urinary frequency  Musculoskeletal: [ ] negative [ ] back pain [ ] myalgias [ ] arthralgias [ ] fracture  Skin:                       [ ] negative [ ] rash [ ] itch  Neurological:        [ ] negative [ ] headache [ ] dizziness [ ] syncope [ ] weakness [ ] numbness  Psychiatric:           [ ] negative [ ] anxiety [ ] depression  Endocrine:            [ ] negative [ ] diabetes [ ] thyroid problem  Heme/Lymph:      [ ] negative [ ] anemia [ ] bleeding problem  Allergic/Immune: [ ] negative [ ] itchy eyes [ ] nasal discharge [ ] hives [ ] angioedema    [x ] All other systems negative  [ ] Unable to assess ROS due to    Current Meds:  ALBUTerol    90 MICROgram(s) HFA Inhaler 1 Puff(s) Inhalation every 4 hours  furosemide   Injectable 40 milliGRAM(s) IV Push two times a day  ipratropium    for Nebulization 500 MICROGram(s) Nebulizer every 6 hours PRN  pantoprazole    Tablet 40 milliGRAM(s) Oral before breakfast  predniSONE   Tablet 10 milliGRAM(s) Oral daily  Remodulin (Treprostinil) vial 50 milliGRAM(s),IV Solution 20 milliLiter(s) 50 milliGRAM(s) IV Continuous <Continuous>  rivaroxaban 10 milliGRAM(s) Oral with dinner  tiotropium 18 MICROgram(s) Capsule 1 Capsule(s) Inhalation daily      PAST MEDICAL & SURGICAL HISTORY:  Right heart failure  Corneal disorder  Sinusitis  PE (pulmonary thromboembolism): on Xarelto 10 mg daily  Asthma: On home O2 nightly at 2L via NC  Pulmonary embolism  Pulmonary hypertension  Anemia  Tachycardia  Pacemaker  History of tubal ligation      Vitals:  T(F): 98.2 (07-22), Max: 98.2 (07-21)  HR: 98 (07-22) (96 - 114)  BP: 95/55 (07-22) (94/66 - 130/85)  RR: 18 (07-22)  SpO2: 93% (07-22)  I&O's Summary    21 Jul 2020 07:01  -  22 Jul 2020 07:00  --------------------------------------------------------  IN: 240 mL / OUT: 600 mL / NET: -360 mL    22 Jul 2020 07:01  -  22 Jul 2020 12:12  --------------------------------------------------------  IN: 240 mL / OUT: 0 mL / NET: 240 mL        Physical Exam:  Appearance: No acute distress; well appearing  Eyes: PERRL, EOMI, pink conjunctiva  HENT: Normal oral mucosa  Cardiovascular: RRR, S1, S2, no murmurs, rubs, or gallops; elevated JVP  Respiratory: b/l Wheezing   Gastrointestinal: soft, non-tender, non-distended with normal bowel sounds  Musculoskeletal: No clubbing; no joint deformity   Neurologic: Non-focal  Lymphatic: No lymphadenopathy  Psychiatry: AAOx3, mood & affect appropriate  Skin: No rashes, ecchymoses, or cyanosis                          12.1   9.37  )-----------( 450      ( 22 Jul 2020 05:24 )             41.2     07-22    137  |  101  |  16  ----------------------------<  81  3.4<L>   |  22  |  1.22    Ca    9.1      22 Jul 2020 05:24  Phos  4.3     07-22  Mg     2.0     07-22    TPro  7.8  /  Alb  4.6  /  TBili  0.7  /  DBili  x   /  AST  14  /  ALT  8<L>  /  AlkPhos  61  07-22    PT/INR - ( 21 Jul 2020 10:55 )   PT: 12.4 sec;   INR: 1.04 ratio         PTT - ( 21 Jul 2020 10:55 )  PTT:40.6 sec      Serum Pro-Brain Natriuretic Peptide: 847 pg/mL (07-21 @ 10:55)

## 2020-07-22 NOTE — PROGRESS NOTE ADULT - PROBLEM SELECTOR PLAN 3
Pt has baseline asthma, getting worse over past month, atrovent requirement now up to 5 times a day.   -2L NC at night  -c/w home atrovent  -add spiriva Pt has baseline asthma, getting worse over past month, atrovent requirement now up to 5 times a day. O/N used atrovent twice.  -2L NC at night  -c/w home atrovent  -add spiriva

## 2020-07-23 LAB
ALBUMIN SERPL ELPH-MCNC: 4.3 G/DL — SIGNIFICANT CHANGE UP (ref 3.3–5)
ALP SERPL-CCNC: 64 U/L — SIGNIFICANT CHANGE UP (ref 40–120)
ALT FLD-CCNC: 7 U/L — LOW (ref 10–45)
ANION GAP SERPL CALC-SCNC: 15 MMOL/L — SIGNIFICANT CHANGE UP (ref 5–17)
AST SERPL-CCNC: 15 U/L — SIGNIFICANT CHANGE UP (ref 10–40)
BILIRUB SERPL-MCNC: 0.5 MG/DL — SIGNIFICANT CHANGE UP (ref 0.2–1.2)
BUN SERPL-MCNC: 25 MG/DL — HIGH (ref 7–23)
CALCIUM SERPL-MCNC: 8.6 MG/DL — SIGNIFICANT CHANGE UP (ref 8.4–10.5)
CHLORIDE SERPL-SCNC: 100 MMOL/L — SIGNIFICANT CHANGE UP (ref 96–108)
CO2 SERPL-SCNC: 20 MMOL/L — LOW (ref 22–31)
CREAT SERPL-MCNC: 1.24 MG/DL — SIGNIFICANT CHANGE UP (ref 0.5–1.3)
GLUCOSE SERPL-MCNC: 90 MG/DL — SIGNIFICANT CHANGE UP (ref 70–99)
HCT VFR BLD CALC: 40.8 % — SIGNIFICANT CHANGE UP (ref 34.5–45)
HGB BLD-MCNC: 11.9 G/DL — SIGNIFICANT CHANGE UP (ref 11.5–15.5)
MAGNESIUM SERPL-MCNC: 1.9 MG/DL — SIGNIFICANT CHANGE UP (ref 1.6–2.6)
MCHC RBC-ENTMCNC: 18.1 PG — LOW (ref 27–34)
MCHC RBC-ENTMCNC: 29.2 GM/DL — LOW (ref 32–36)
MCV RBC AUTO: 62.2 FL — LOW (ref 80–100)
NRBC # BLD: 0 /100 WBCS — SIGNIFICANT CHANGE UP (ref 0–0)
PHOSPHATE SERPL-MCNC: 5.1 MG/DL — HIGH (ref 2.5–4.5)
PLATELET # BLD AUTO: 452 K/UL — HIGH (ref 150–400)
POTASSIUM SERPL-MCNC: 3.6 MMOL/L — SIGNIFICANT CHANGE UP (ref 3.5–5.3)
POTASSIUM SERPL-SCNC: 3.6 MMOL/L — SIGNIFICANT CHANGE UP (ref 3.5–5.3)
PROT SERPL-MCNC: 7.4 G/DL — SIGNIFICANT CHANGE UP (ref 6–8.3)
RBC # BLD: 6.56 M/UL — HIGH (ref 3.8–5.2)
RBC # FLD: 19.9 % — HIGH (ref 10.3–14.5)
SODIUM SERPL-SCNC: 135 MMOL/L — SIGNIFICANT CHANGE UP (ref 135–145)
WBC # BLD: 12.26 K/UL — HIGH (ref 3.8–10.5)
WBC # FLD AUTO: 12.26 K/UL — HIGH (ref 3.8–10.5)

## 2020-07-23 PROCEDURE — 93453 R&L HRT CATH W/VENTRICLGRPHY: CPT | Mod: 26

## 2020-07-23 PROCEDURE — 99233 SBSQ HOSP IP/OBS HIGH 50: CPT

## 2020-07-23 PROCEDURE — 99152 MOD SED SAME PHYS/QHP 5/>YRS: CPT

## 2020-07-23 PROCEDURE — 99232 SBSQ HOSP IP/OBS MODERATE 35: CPT | Mod: GC

## 2020-07-23 RX ORDER — SPIRONOLACTONE 25 MG/1
12.5 TABLET, FILM COATED ORAL DAILY
Refills: 0 | Status: DISCONTINUED | OUTPATIENT
Start: 2020-07-23 | End: 2020-07-23

## 2020-07-23 RX ORDER — SPIRONOLACTONE 25 MG/1
25 TABLET, FILM COATED ORAL DAILY
Refills: 0 | Status: DISCONTINUED | OUTPATIENT
Start: 2020-07-23 | End: 2020-07-29

## 2020-07-23 RX ORDER — RIVAROXABAN 15 MG-20MG
10 KIT ORAL
Refills: 0 | Status: DISCONTINUED | OUTPATIENT
Start: 2020-07-23 | End: 2020-07-29

## 2020-07-23 RX ADMIN — Medication 500 MICROGRAM(S): at 13:49

## 2020-07-23 RX ADMIN — Medication 500 MICROGRAM(S): at 04:31

## 2020-07-23 RX ADMIN — PANTOPRAZOLE SODIUM 40 MILLIGRAM(S): 20 TABLET, DELAYED RELEASE ORAL at 05:51

## 2020-07-23 RX ADMIN — Medication 10 MILLIGRAM(S): at 05:51

## 2020-07-23 NOTE — PROGRESS NOTE ADULT - ASSESSMENT
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 Presentation possibly due to RV volume overload, will trial diuresis and reassess improvement of symptoms.    Pertinent Studies:  RHC 5/4/2018: HR 88 bpm, RAP 4, PAP 88/31 (51), CWP 6, PA sat 61% for CO/CI 5.7/28, PVR 8.6 MONACO  TTE 11/2019: normal LV systolic function. TAJ, RV enlargement with decreased RV systolic function. Mild TR. 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 Presentation possibly due to RV volume overload, will trial diuresis and reassess improvement of symptoms.    Pertinent Studies:  RHC 7/23/20:  RA 4, RV 80/4 PA 80/40 57 PCWP 22 (24) PA 51, A0 91 Xenia 4.1/2.0   PVR wedge 8.7, PVR LVEDP 13.3   RHC 5/4/2018: HR 88 bpm, RAP 4, PAP 88/31 (51), CWP 6, PA sat 61% for CO/CI 5.7/28, PVR 8.6 MONACO  TTE 11/2019: normal LV systolic function. TAJ, RV enlargement with decreased RV systolic function. Mild TR. PaSP approximately 55    TTE7/2020: RVSP > 100

## 2020-07-23 NOTE — PROGRESS NOTE ADULT - SUBJECTIVE AND OBJECTIVE BOX
Interval History: Awaiting R/LHC for LVEDP today    Medications:  furosemide   Injectable 40 milliGRAM(s) IV Push two times a day  ipratropium    for Nebulization 500 MICROGram(s) Nebulizer every 6 hours PRN  levalbuterol Inhalation 0.63 milliGRAM(s) Inhalation every 4 hours PRN  pantoprazole    Tablet 40 milliGRAM(s) Oral before breakfast  predniSONE   Tablet 10 milliGRAM(s) Oral daily  Remodulin (Treprostinil) vial 50 milliGRAM(s),IV Solution 20 milliLiter(s) 50 milliGRAM(s) IV Continuous <Continuous>  tiotropium 18 MICROgram(s) Capsule 1 Capsule(s) Inhalation daily      Vitals:  T(C): 37.3 (20 @ 09:21), Max: 37.3 (20 @ 09:21)  HR: 100 (20 @ 09:21) (97 - 104)  BP: 119/73 (20 @ 09:21) (95/55 - 119/73)  BP(mean): --  RR: 18 (20 @ 09:21) (16 - 18)  SpO2: 94% (20 @ 09:21) (93% - 96%)    Daily     Daily Weight in k (2020 10:23)    Weight (kg): 86.2 ( @ 09:51)    I&O's Summary    2020 07:  -  2020 07:00  --------------------------------------------------------  IN: 1020 mL / OUT: 1800 mL / NET: -780 mL    :  -  2020 09:41  --------------------------------------------------------  IN: 0 mL / OUT: 0 mL / NET: 0 mL        Physical Exam:  Appearance: No Acute Distress  HEENT: PERRL  Neck: No JVD  Cardiovascular: Normal S1 S2, No murmurs/rubs/gallops  Respiratory: Clear to auscultation bilaterally  Gastrointestinal: Soft, Non-tender	  Skin: No cyanosis	  Neurologic: Non-focal  Extremities: No LE edema  Psychiatry: A & O x 3, Mood & affect appropriate    Labs:                        11.9   12.26 )-----------( 452      ( 2020 06:29 )             40.8     07-    135  |  100  |  25<H>  ----------------------------<  90  3.6   |  20<L>  |  1.24    Ca    8.6      2020 06:29  Phos  5.1     07-  Mg     1.9     -23    TPro  7.4  /  Alb  4.3  /  TBili  0.5  /  DBili  x   /  AST  15  /  ALT  7<L>  /  AlkPhos  64  07-23    PT/INR - ( 2020 10:55 )   PT: 12.4 sec;   INR: 1.04 ratio         PTT - ( 2020 10:55 )  PTT:40.6 sec

## 2020-07-23 NOTE — PROGRESS NOTE ADULT - SUBJECTIVE AND OBJECTIVE BOX
Interval Events:  Net negative 780cc on 40mg BID IV lasix. Feels mildly improved from respiratory standpoint. Improved ambulatory tolerance. Wheezing improved on ipratropium. Ordered for levalbuterol, though has not needed. Had one episode of emesis this morning. Reports chronic nausea w/o emesis. No fevers, chills, diarrhea.     REVIEW OF SYSTEMS:  Constitutional: [x] negative [ ] fevers [ ] chills [ ] weight loss [ ] weight gain  HEENT: [x] negative [ ] dry eyes [ ] eye irritation [ ] postnasal drip [ ] nasal congestion  CV: [x] negative  [ ] chest pain [ ] orthopnea [ ] palpitations [ ] murmur  Resp: [ ] negative [x] cough [ ] shortness of breath [ ] dyspnea [ ] wheezing [ ] sputum [ ] hemoptysis  GI: [ ] negative [ ] nausea [ ] vomiting [ ] diarrhea [ ] constipation [ ] abd pain [ ] dysphagia   : [ ] negative [ ] dysuria [ ] nocturia [ ] hematuria [ ] increased urinary frequency  Musculoskeletal: [ ] negative [ ] back pain [ ] myalgias [ ] arthralgias [ ] fracture  Skin: [ ] negative [ ] rash [ ] itch  Neurological: [ ] negative [ ] headache [ ] dizziness [ ] syncope [ ] weakness [ ] numbness  Psychiatric: [ ] negative [ ] anxiety [ ] depression  Endocrine: [ ] negative [ ] diabetes [ ] thyroid problem  Hematologic/Lymphatic: [ ] negative [ ] anemia [ ] bleeding problem  Allergic/Immunologic: [ ] negative [ ] itchy eyes [ ] nasal discharge [ ] hives [ ] angioedema  [x] All other systems negative  [ ] Unable to assess ROS because ________    OBJECTIVE:  ICU Vital Signs Last 24 Hrs  T(C): 37.3 (23 Jul 2020 09:21), Max: 37.3 (23 Jul 2020 09:21)  T(F): 99.1 (23 Jul 2020 09:21), Max: 99.1 (23 Jul 2020 09:21)  HR: 100 (23 Jul 2020 09:21) (97 - 104)  BP: 119/73 (23 Jul 2020 09:21) (95/55 - 119/73)  BP(mean): --  ABP: --  ABP(mean): --  RR: 18 (23 Jul 2020 09:21) (16 - 18)  SpO2: 94% (23 Jul 2020 09:21) (93% - 96%)        07-22 @ 07:01 - 07-23 @ 07:00  --------------------------------------------------------  IN: 1020 mL / OUT: 1800 mL / NET: -780 mL    07-23 @ 07:01 - 07-23 @ 09:59  --------------------------------------------------------  IN: 0 mL / OUT: 0 mL / NET: 0 mL      CAPILLARY BLOOD GLUCOSE          PHYSICAL EXAM:  GEN: Middle aged female, NAD  HEENT: EOMI, MMM  CV: Tachycardic w/o mrg  PULM: CTAB  ABD: Soft, NT, ND  EXT: WWP, no edema    HOSPITAL MEDICATIONS:  MEDICATIONS  (STANDING):  furosemide   Injectable 40 milliGRAM(s) IV Push two times a day  pantoprazole    Tablet 40 milliGRAM(s) Oral before breakfast  predniSONE   Tablet 10 milliGRAM(s) Oral daily  Remodulin (Treprostinil) vial 50 milliGRAM(s),IV Solution 20 milliLiter(s) 50 milliGRAM(s) (17 NANOGram(s)/kG/Min) IV Continuous <Continuous>  tiotropium 18 MICROgram(s) Capsule 1 Capsule(s) Inhalation daily    MEDICATIONS  (PRN):  ipratropium    for Nebulization 500 MICROGram(s) Nebulizer every 6 hours PRN Shortness of Breath  levalbuterol Inhalation 0.63 milliGRAM(s) Inhalation every 4 hours PRN SOB/wheezing      LABS:                        11.9   12.26 )-----------( 452      ( 23 Jul 2020 06:29 )             40.8     Hgb Trend: 11.9<--, 12.1<--, 11.1<--  07-23    135  |  100  |  25<H>  ----------------------------<  90  3.6   |  20<L>  |  1.24    Ca    8.6      23 Jul 2020 06:29  Phos  5.1     07-23  Mg     1.9     07-23    TPro  7.4  /  Alb  4.3  /  TBili  0.5  /  DBili  x   /  AST  15  /  ALT  7<L>  /  AlkPhos  64  07-23    Creatinine Trend: 1.24<--, 1.22<--, 1.07<--  PT/INR - ( 21 Jul 2020 10:55 )   PT: 12.4 sec;   INR: 1.04 ratio         PTT - ( 21 Jul 2020 10:55 )  PTT:40.6 sec      Venous Blood Gas:  07-22 @ 05:27  7.37/42/30/24/41  VBG Lactate: 1.3  Venous Blood Gas:  07-21 @ 20:29  7.40/38/20/23/19  VBG Lactate: 1.4      MICROBIOLOGY:       RADIOLOGY:  [ ] Reviewed and interpreted by me    PULMONARY FUNCTION TESTS:    EKG:

## 2020-07-23 NOTE — PROGRESS NOTE ADULT - ASSESSMENT
61 yo F PMH WHO class I pHTN w/ history of PE (previously on riociguat, currently on remodulin), PE on half dose xarelto iso anemia, bradycardia s/p PPM ([place], 2016), AVRNT s/p ablation (5/2020), asthma, and nocturnal hypoxia on intermittent 2LNC p/w acute on chronic worsening SOB c/f overload vs. progression of disease.     #SOB/pHTN  - mild improvement in respiratory symptoms with diuresis. Plan for RHC today w/ subsequent LVEDP to assess for pHTN progression  - c/w diuresis per advanced HF; labs w/ e/o rising creatinine, appears euvolemic on exam though still with data suggesting RV overload  - c/w currently dose of SQ remodulin 17; will consider adjunctive therapies vs. IV pending RHC  - c/w prednisone 10  - c/w ipratropium  - can use levalbuterol for further bronchodilation given less risk of KEE induced tachycardia   - please be cautious with IV beta blocker as may worsen asthma and, more importantly, worsen LV function  - recommend close monitoring of hemodynamics, PLEASE AVOID EXCESSIVE IV FLUIDS AS MAY WORSEN RV DILATION AND FURTHER COMPROMISE LV FUNCTION/CARDIAC OUTPUT  - okay to continue nocturnal O2  - recommend telemetry monitoring  - low threshold for uptriage to ICU for ionotrope assisted diuresis if worsening signs of end ogan dysfunction or hypoperfusion/hypotension   - pulmonary to follow    Demarco Ortiz MD  PGY-4  PCCM Fellow  Pager [phone]    Note unofficial until cosigned by attending

## 2020-07-23 NOTE — PROGRESS NOTE ADULT - ATTENDING COMMENTS
Patient seen and examined with the resident and team at bedside, Labs and vital are reviewed. I agree with above unless noted below    A/P; 61 Y/O/F with  PMHx  class I pHTN ,currently on Remodulin, PE on half dose xarelto, , bradycardia s/p , AVRNT s/p ablation (5/2020), asthma, chronic right sided systolic heart failure  with Acute on chronic Right sided systolic heart failure in a setting P HTN   s/p cath consistent with severe pulmonary HTN   can DC IV Lasix as per heart failure team   may up titrate Remodulin infusion,  likely DC in next  24 hours Patient seen and examined with the resident and team at bedside, Labs and vital are reviewed. I agree with above unless noted below    A/P; 61 Y/O/F with  PMHx  class I pHTN ,currently on Remodulin, PE on half dose xarelto, , bradycardia s/p , AVRNT s/p ablation (5/2020), asthma, chronic right sided systolic heart failure  with Acute on chronic Right sided systolic heart failure in a setting P HTN   s/p cath consistent with severe pulmonary HTN   can DC IV Lasix as per heart failure team   may up titrate Remodulin infusion,  Mild leukocytosis - afebrile likely secondary to steroid   likely DC in next  24 hours

## 2020-07-23 NOTE — PROGRESS NOTE ADULT - PROBLEM SELECTOR PLAN 2
Pt c/o SOB for 1 month possibly 2/2 fluid overload in the setting of recent hospitalization w/fluids given. Pt at baseline HR and BP but given SOB and lightheadedness on exertion, TTE with RV enlargement and LV dysfunction possibly 2/2 to RHF. Elevated BNP with troponins 6<-7<-10<-9.   Plan - Pulm and Cardio recs appreciated greatly  - initial 40mg Lasix IV in ED;  - STRICT Is and Os with goal net negative 500 to 1000cc daily  close hemodynamic monitoring  - avoid excess IV fluids, may worsen RV dilation and further compromise LV function/cardiac output  - VBG for evaluation of end organ hypoperfusion  - re-initiate 10mg prednisone daily, no signs of acute asthma exacerbation  - prn ipratropium  - okay to defer albuterol iso tachycardia  - avoid IV beta blocker  - Close f/u renal function in setting of dye load for CT.  - Continue home remodulin 17ng/kg/ml

## 2020-07-23 NOTE — PROGRESS NOTE ADULT - PROBLEM SELECTOR PLAN 3
- CT chest done showing now PE, however, breast lesion seen.   - Pt would need mammogram. - CT chest done showing no PE, however, breast lesion seen.   - Pt would need mammogram.

## 2020-07-23 NOTE — PROGRESS NOTE ADULT - SUBJECTIVE AND OBJECTIVE BOX
Dr. Eulalio Reyes  Internal Medicine PGY-2  Pager# [phone]    Patient is a 40y old  Female who presents with a chief complaint of shortness of breath (22 Jul 2020 14:17)      SUBJECTIVE / OVERNIGHT EVENTS:    MEDICATIONS  (STANDING):  furosemide   Injectable 40 milliGRAM(s) IV Push two times a day  pantoprazole    Tablet 40 milliGRAM(s) Oral before breakfast  predniSONE   Tablet 10 milliGRAM(s) Oral daily  Remodulin (Treprostinil) vial 50 milliGRAM(s),IV Solution 20 milliLiter(s) 50 milliGRAM(s) (17 NANOGram(s)/kG/Min) IV Continuous <Continuous>  tiotropium 18 MICROgram(s) Capsule 1 Capsule(s) Inhalation daily    MEDICATIONS  (PRN):  ipratropium    for Nebulization 500 MICROGram(s) Nebulizer every 6 hours PRN Shortness of Breath  levalbuterol Inhalation 0.63 milliGRAM(s) Inhalation every 4 hours PRN SOB/wheezing      Vital Signs Last 24 Hrs  T(C): 36.8 (23 Jul 2020 05:02), Max: 37 (22 Jul 2020 20:29)  T(F): 98.2 (23 Jul 2020 05:02), Max: 98.6 (22 Jul 2020 20:29)  HR: 101 (23 Jul 2020 05:02) (97 - 104)  BP: 113/71 (23 Jul 2020 05:02) (95/55 - 113/71)  BP(mean): --  RR: 16 (23 Jul 2020 05:02) (16 - 18)  SpO2: 96% (23 Jul 2020 05:02) (93% - 96%)  CAPILLARY BLOOD GLUCOSE        I&O's Summary    22 Jul 2020 07:01  -  23 Jul 2020 07:00  --------------------------------------------------------  IN: 1020 mL / OUT: 1800 mL / NET: -780 mL    23 Jul 2020 07:01  -  23 Jul 2020 09:08  --------------------------------------------------------  IN: 0 mL / OUT: 0 mL / NET: 0 mL        PHYSICAL EXAM:  GENERAL: NAD, well-developed  HEAD:  Atraumatic, Normocephalic  EYES: EOMI, PERRLA, conjunctiva and sclera clear  NECK: Supple, No JVD  CHEST/LUNG: Clear to auscultation bilaterally; No wheeze  HEART: Regular rate and rhythm; No murmurs, rubs, or gallops  ABDOMEN: Soft, Nontender, Nondistended; Bowel sounds present  EXTREMITIES:  2+ Peripheral Pulses, No clubbing, cyanosis, or edema  PSYCH: AAOx3  NEUROLOGY: non-focal  SKIN: No rashes or lesions    LABS:                        11.9   12.26 )-----------( 452      ( 23 Jul 2020 06:29 )             40.8     07-23    135  |  100  |  25<H>  ----------------------------<  90  3.6   |  20<L>  |  1.24    Ca    8.6      23 Jul 2020 06:29  Phos  5.1     07-23  Mg     1.9     07-23    TPro  7.4  /  Alb  4.3  /  TBili  0.5  /  DBili  x   /  AST  15  /  ALT  7<L>  /  AlkPhos  64  07-23    PT/INR - ( 21 Jul 2020 10:55 )   PT: 12.4 sec;   INR: 1.04 ratio         PTT - ( 21 Jul 2020 10:55 )  PTT:40.6 sec          RADIOLOGY & ADDITIONAL TESTS:    Imaging Personally Reviewed:    Consultant(s) Notes Reviewed:      Care Discussed with Consultants/Other Providers: Dr. Eulalio Reyes  Internal Medicine PGY-2  Pager# [phone]    Patient is a 40y old  Female who presents with a chief complaint of shortness of breath (22 Jul 2020 14:17)      SUBJECTIVE / OVERNIGHT EVENTS: No acute events overnight. Patient was HD stable. She was net negative 780 cc. Denies chest tightness and endorses improvement in SOB and cough. Had one episode of NBNB emesis.     MEDICATIONS  (STANDING):  furosemide   Injectable 40 milliGRAM(s) IV Push two times a day  pantoprazole    Tablet 40 milliGRAM(s) Oral before breakfast  predniSONE   Tablet 10 milliGRAM(s) Oral daily  Remodulin (Treprostinil) vial 50 milliGRAM(s),IV Solution 20 milliLiter(s) 50 milliGRAM(s) (17 NANOGram(s)/kG/Min) IV Continuous <Continuous>  tiotropium 18 MICROgram(s) Capsule 1 Capsule(s) Inhalation daily    MEDICATIONS  (PRN):  ipratropium    for Nebulization 500 MICROGram(s) Nebulizer every 6 hours PRN Shortness of Breath  levalbuterol Inhalation 0.63 milliGRAM(s) Inhalation every 4 hours PRN SOB/wheezing      Vital Signs Last 24 Hrs  T(C): 36.8 (23 Jul 2020 05:02), Max: 37 (22 Jul 2020 20:29)  T(F): 98.2 (23 Jul 2020 05:02), Max: 98.6 (22 Jul 2020 20:29)  HR: 101 (23 Jul 2020 05:02) (97 - 104)  BP: 113/71 (23 Jul 2020 05:02) (95/55 - 113/71)  BP(mean): --  RR: 16 (23 Jul 2020 05:02) (16 - 18)  SpO2: 96% (23 Jul 2020 05:02) (93% - 96%)  CAPILLARY BLOOD GLUCOSE        I&O's Summary    22 Jul 2020 07:01  -  23 Jul 2020 07:00  --------------------------------------------------------  IN: 1020 mL / OUT: 1800 mL / NET: -780 mL    23 Jul 2020 07:01  -  23 Jul 2020 09:08  --------------------------------------------------------  IN: 0 mL / OUT: 0 mL / NET: 0 mL        PHYSICAL EXAM:  GENERAL: NAD, well-developed  HEAD:  Atraumatic, Normocephalic  EYES: EOMI, PERRLA, conjunctiva and sclera clear  NECK: Supple, No JVD  CHEST/LUNG: crackles at base of lung; No wheeze  HEART: Regular rate and rhythm; No murmurs, rubs, or gallops  ABDOMEN: Soft, Nontender, Nondistended; Bowel sounds present  EXTREMITIES:  2+ Peripheral Pulses, 1+ b/l LE edema  PSYCH: AAOx3  NEUROLOGY: non-focal  SKIN: No rashes or lesions    LABS:                        11.9   12.26 )-----------( 452      ( 23 Jul 2020 06:29 )             40.8     07-23    135  |  100  |  25<H>  ----------------------------<  90  3.6   |  20<L>  |  1.24    Ca    8.6      23 Jul 2020 06:29  Phos  5.1     07-23  Mg     1.9     07-23    TPro  7.4  /  Alb  4.3  /  TBili  0.5  /  DBili  x   /  AST  15  /  ALT  7<L>  /  AlkPhos  64  07-23    PT/INR - ( 21 Jul 2020 10:55 )   PT: 12.4 sec;   INR: 1.04 ratio         PTT - ( 21 Jul 2020 10:55 )  PTT:40.6 sec          RADIOLOGY & ADDITIONAL TESTS:    Imaging Personally Reviewed:    Consultant(s) Notes Reviewed:      Care Discussed with Consultants/Other Providers:

## 2020-07-23 NOTE — PROGRESS NOTE ADULT - PROBLEM SELECTOR PLAN 1
- Pt on Remodulin at home.   - C/w this for now.   - She seems to be responding to diuretics at this time. Continue with lasix 40 IV bid.   - goal to keep net negative 1000 cc over next 24 hours.   - start spironolactone 25 QD  - Given that PA pressures are much higher on echo compared to previous echo will plan for assessment via RHC and LHC (only for LVEDP).   - Hold  tonight in preparation.  - pulm recs appreciated. - c/w home remodulin, will need uptitration per Dr. Yoon  - RAP 4, discontinue diuretics at this time  - LVEDP 4, with significantly elevated PVR as above  - start aldactone 12.5 daily  - possible DC home tomorrow after uptitration of remodulin per Dr. Yoon, can dc on lasix 40 PO three times weekly

## 2020-07-23 NOTE — PROGRESS NOTE ADULT - PROBLEM SELECTOR PLAN 3
Pt has baseline asthma, getting worse over past month, atrovent requirement now up to 5 times a day. O/N used atrovent twice.  -2L NC at night  -c/w home atrovent  -add spiriva Pt has baseline asthma, getting worse over past month, atrovent requirement now up to 5 times a day. O/N used atrovent twice.  -2L NC at night  -c/w home atrovent and spiriva

## 2020-07-23 NOTE — PROGRESS NOTE ADULT - PROBLEM SELECTOR PLAN 4
Pt has chronic pulmonary embolism, CT-Angio showed distal pulmonary arteries are diminished in size.  Plan:  c/w home xarelto 10mg Daily Pt has chronic pulmonary embolism, CT-Angio showed distal pulmonary arteries are diminished in size.  Plan:  Holding home xarelto 10mg Daily in the setting of cardiac cath

## 2020-07-23 NOTE — CHART NOTE - NSCHARTNOTEFT_GEN_A_CORE
Removal of Femoral Sheath    Pulses in the rightlower extremity are palpable. The patient was placed in the supine position. The insertion site was identified and the sutures were removed per protocol.  The 5 [place] femoral arterial sheath was then removed. Direct pressure was applied for 15 minutes. The 7 [place] femoral arterial sheath was also removed, and pressure was applied for 11 minutes.    Monitoring of the right groin and both lower extremities including neuro-vascular checks and vital signs every 15 minutes x 4, then every 30 minutes x 2, then every 1 hour was ordered.    Complications: None/Other    Savannah Meadows MD  Cardiology Fellow, PGY-4

## 2020-07-23 NOTE — PROGRESS NOTE ADULT - ATTENDING COMMENTS
s/p R/L cath  RA 4, RV 80/4 PA 80/40 57 PCWP 22 (24) PA 51, A0 91 Xenia 4.1/2.0   PVR wedge 8.7, PVR LVEDP 13.3   meds: remodulin 17, lasix 40 IV bid, pred 10, protonix.   afebrile, 101, 90/-110/ 96  I/O: -780  07-23    135  |  100  |  25<H>  ----------------------------<  90  3.6   |  20<L>  |  1.24  Ca    8.6      23 Jul 2020 06:29  Phos  5.1     07-23  Mg     1.9     07-23  TPro  7.4  /  Alb  4.3  /  TBili  0.5  /  DBili  x   /  AST  15  /  ALT  7<L>  /  AlkPhos  64  07-23  Cath shows severe pulmonary HTN. will discuss change to IV prostacyclin with Dr Yoon.   RA pressure is low. d/c lasix. could d/c on lasix 40 tiw and aldactone 25.   William Harper s/p R/L cath  RA 4, RV 80/4 PA 80/40 57 PCWP 22 (24) PA 51, A0 91 Xenia 4.1/2.0   PVR wedge 8.7, PVR LVEDP 13.3   meds: remodulin 17, lasix 40 IV bid, pred 10, protonix.   afebrile, 101, 90/-110/ 96  I/O: -780  07-23    135  |  100  |  25<H>  ----------------------------<  90  3.6   |  20<L>  |  1.24  Ca    8.6      23 Jul 2020 06:29  Phos  5.1     07-23  Mg     1.9     07-23  TPro  7.4  /  Alb  4.3  /  TBili  0.5  /  DBili  x   /  AST  15  /  ALT  7<L>  /  AlkPhos  64  07-23  Cath shows severe pulmonary HTN. will discuss change to IV prostacyclin with Dr Yoon.   RA pressure is low. d/c lasix. could d/c on lasix 40 tiw and aldactone 25.   Addendum:  d/w Dr Mas, for increase in s/c remodulin. patient can be d/c home for daily uptitration. he will see patient this evenign.    William Harper

## 2020-07-23 NOTE — PROGRESS NOTE ADULT - PROBLEM SELECTOR PLAN 8
c/w home xarelto 10mg daily  DASH diet  telemetry  protonix 40mg daily DVT ppx- holding Xarelto in the setting of cardiac cath  DASH diet  telemetry  protonix 40mg daily

## 2020-07-23 NOTE — PROGRESS NOTE ADULT - PROBLEM SELECTOR PLAN 1
Pt has known RHF d/t pulmonary HTN. Pt c/o SOB for 1 month possibly 2/2 fluid overload in the setting of recent hospitalization w/fluids given. Pt at baseline HR and BP but given SOB and lightheadedness on exertion, TTE with RV enlargement and LV dysfunction possibly 2/2 to RHF. Elevated BNP with troponins 6<-7<-10<-9.   Plan - Pulm and Cardio recs appreciated greatly  - initial 40mg Lasix IV in ED;  - STRICT Is and Os with goal net negative 500 to 1000cc daily  close hemodynamic monitoring  - avoid excess IV fluids, may worsen RV dilation and further compromise LV function/cardiac output  - VBG for evaluation of end organ hypoperfusion  - re-initiate 10mg prednisone daily, no signs of acute asthma exacerbation  - prn ipratropium  - okay to defer albuterol iso tachycardia  - avoid IV beta blocker  - Close f/u renal function in setting of dye load for CT.  - after initial lasix, start 40mg lasix bid Pt has known RHF d/t pulmonary HTN. Pt c/o SOB for 1 month possibly 2/2 fluid overload in the setting of recent hospitalization w/fluids given. Pt at baseline HR and BP but given SOB and lightheadedness on exertion, TTE with RV enlargement and LV dysfunction possibly 2/2 to RHF. Elevated BNP with troponins 6<-7<-10<-9.   Plan - Pulm and Cardio recs appreciated greatly  - initial 40mg Lasix IV in ED;  - STRICT Is and Os with goal net negative 500 to 1000cc daily  close hemodynamic monitoring  - avoid excess IV fluids, may worsen RV dilation and further compromise LV function/cardiac output  - VBG for evaluation of end organ hypoperfusion  - re-initiate 10mg prednisone daily, no signs of acute asthma exacerbation  - prn ipratropium  - okay to defer albuterol iso tachycardia  - avoid IV beta blocker  - Close f/u renal function in setting of dye load for CT.  - after initial lasix, start 40mg lasix bid  - Plan for RT heart cath. Will f/u on recs

## 2020-07-23 NOTE — PROGRESS NOTE ADULT - ASSESSMENT
Pt is a 39 y/o female with PMHx pulmonary HTN w/ Class I/IV features (dx in 2014) on continuous Remodulin infusions c/b RV systolic failure s/p PPM, chronic asthma on Atrovent and 2L nasal cannula at night, chronic PE on Xarelto 10mg, GERD, SVT s/p ablation (5/2020), chronic sinusitis, unspecified keratoconus who p/w worsening SOB of 1 month duration. Pt is being admitted to floors for acute-on-chronic RHF and PAH with plan for careful diuresis and continued monitoring of fluid status, currently hemodynamically stable. Pt is a 39 y/o female with PMHx pulmonary HTN w/ Class I/IV features (dx in 2014) on continuous Remodulin infusions c/b RV systolic failure s/p PPM, chronic asthma on Atrovent and 2L nasal cannula at night, chronic PE on Xarelto 10mg, GERD, SVT s/p ablation (5/2020), chronic sinusitis, unspecified keratoconus who p/w worsening SOB of 1 month duration. Pt is being admitted to floors for acute-on-chronic RHF and PAH with plan for careful diuresis and continued monitoring of fluid status, currently hemodynamically stable. Plan for RT heart cath today.

## 2020-07-24 ENCOUNTER — TRANSCRIPTION ENCOUNTER (OUTPATIENT)
Age: 40
End: 2020-07-24

## 2020-07-24 ENCOUNTER — NON-APPOINTMENT (OUTPATIENT)
Age: 40
End: 2020-07-24

## 2020-07-24 DIAGNOSIS — N63.0 UNSPECIFIED LUMP IN UNSPECIFIED BREAST: ICD-10-CM

## 2020-07-24 LAB
ALBUMIN SERPL ELPH-MCNC: 4.3 G/DL — SIGNIFICANT CHANGE UP (ref 3.3–5)
ALP SERPL-CCNC: 57 U/L — SIGNIFICANT CHANGE UP (ref 40–120)
ALT FLD-CCNC: 6 U/L — LOW (ref 10–45)
ANION GAP SERPL CALC-SCNC: 12 MMOL/L — SIGNIFICANT CHANGE UP (ref 5–17)
APTT BLD: 35.4 SEC — SIGNIFICANT CHANGE UP (ref 27.5–35.5)
AST SERPL-CCNC: 14 U/L — SIGNIFICANT CHANGE UP (ref 10–40)
BASOPHILS # BLD AUTO: 0.09 K/UL — SIGNIFICANT CHANGE UP (ref 0–0.2)
BASOPHILS NFR BLD AUTO: 0.9 % — SIGNIFICANT CHANGE UP (ref 0–2)
BILIRUB SERPL-MCNC: 0.8 MG/DL — SIGNIFICANT CHANGE UP (ref 0.2–1.2)
BUN SERPL-MCNC: 19 MG/DL — SIGNIFICANT CHANGE UP (ref 7–23)
CALCIUM SERPL-MCNC: 8.9 MG/DL — SIGNIFICANT CHANGE UP (ref 8.4–10.5)
CHLORIDE SERPL-SCNC: 100 MMOL/L — SIGNIFICANT CHANGE UP (ref 96–108)
CO2 SERPL-SCNC: 22 MMOL/L — SIGNIFICANT CHANGE UP (ref 22–31)
CREAT SERPL-MCNC: 1.1 MG/DL — SIGNIFICANT CHANGE UP (ref 0.5–1.3)
EOSINOPHIL # BLD AUTO: 0.23 K/UL — SIGNIFICANT CHANGE UP (ref 0–0.5)
EOSINOPHIL NFR BLD AUTO: 2.3 % — SIGNIFICANT CHANGE UP (ref 0–6)
GLUCOSE SERPL-MCNC: 87 MG/DL — SIGNIFICANT CHANGE UP (ref 70–99)
HCT VFR BLD CALC: 39.3 % — SIGNIFICANT CHANGE UP (ref 34.5–45)
HGB BLD-MCNC: 11.5 G/DL — SIGNIFICANT CHANGE UP (ref 11.5–15.5)
IMM GRANULOCYTES NFR BLD AUTO: 0.2 % — SIGNIFICANT CHANGE UP (ref 0–1.5)
INR BLD: 1.14 RATIO — SIGNIFICANT CHANGE UP (ref 0.88–1.16)
LYMPHOCYTES # BLD AUTO: 2.69 K/UL — SIGNIFICANT CHANGE UP (ref 1–3.3)
LYMPHOCYTES # BLD AUTO: 27.1 % — SIGNIFICANT CHANGE UP (ref 13–44)
MAGNESIUM SERPL-MCNC: 2 MG/DL — SIGNIFICANT CHANGE UP (ref 1.6–2.6)
MCHC RBC-ENTMCNC: 18.3 PG — LOW (ref 27–34)
MCHC RBC-ENTMCNC: 29.3 GM/DL — LOW (ref 32–36)
MCV RBC AUTO: 62.4 FL — LOW (ref 80–100)
MONOCYTES # BLD AUTO: 0.97 K/UL — HIGH (ref 0–0.9)
MONOCYTES NFR BLD AUTO: 9.8 % — SIGNIFICANT CHANGE UP (ref 2–14)
NEUTROPHILS # BLD AUTO: 5.93 K/UL — SIGNIFICANT CHANGE UP (ref 1.8–7.4)
NEUTROPHILS NFR BLD AUTO: 59.7 % — SIGNIFICANT CHANGE UP (ref 43–77)
NRBC # BLD: 0 /100 WBCS — SIGNIFICANT CHANGE UP (ref 0–0)
PHOSPHATE SERPL-MCNC: 3.8 MG/DL — SIGNIFICANT CHANGE UP (ref 2.5–4.5)
PLATELET # BLD AUTO: 415 K/UL — HIGH (ref 150–400)
POTASSIUM SERPL-MCNC: 3.6 MMOL/L — SIGNIFICANT CHANGE UP (ref 3.5–5.3)
POTASSIUM SERPL-SCNC: 3.6 MMOL/L — SIGNIFICANT CHANGE UP (ref 3.5–5.3)
PROT SERPL-MCNC: 7.1 G/DL — SIGNIFICANT CHANGE UP (ref 6–8.3)
PROTHROM AB SERPL-ACNC: 13.4 SEC — SIGNIFICANT CHANGE UP (ref 10.6–13.6)
RBC # BLD: 6.3 M/UL — HIGH (ref 3.8–5.2)
RBC # FLD: 19.6 % — HIGH (ref 10.3–14.5)
SODIUM SERPL-SCNC: 134 MMOL/L — LOW (ref 135–145)
WBC # BLD: 9.93 K/UL — SIGNIFICANT CHANGE UP (ref 3.8–10.5)
WBC # FLD AUTO: 9.93 K/UL — SIGNIFICANT CHANGE UP (ref 3.8–10.5)

## 2020-07-24 PROCEDURE — 99233 SBSQ HOSP IP/OBS HIGH 50: CPT | Mod: GC

## 2020-07-24 PROCEDURE — 99232 SBSQ HOSP IP/OBS MODERATE 35: CPT | Mod: GC

## 2020-07-24 RX ORDER — TIOTROPIUM BROMIDE 18 UG/1
1 CAPSULE ORAL; RESPIRATORY (INHALATION)
Qty: 0 | Refills: 0 | DISCHARGE
Start: 2020-07-24

## 2020-07-24 RX ORDER — TREPROSTINIL 48 UG/1
3 INHALANT ORAL
Qty: 0 | Refills: 0 | DISCHARGE

## 2020-07-24 RX ORDER — SPIRONOLACTONE 25 MG/1
1 TABLET, FILM COATED ORAL
Qty: 0 | Refills: 0 | DISCHARGE
Start: 2020-07-24

## 2020-07-24 RX ADMIN — Medication 10 MILLIGRAM(S): at 05:47

## 2020-07-24 RX ADMIN — RIVAROXABAN 10 MILLIGRAM(S): KIT at 17:30

## 2020-07-24 RX ADMIN — SPIRONOLACTONE 25 MILLIGRAM(S): 25 TABLET, FILM COATED ORAL at 11:47

## 2020-07-24 RX ADMIN — Medication 500 MICROGRAM(S): at 01:19

## 2020-07-24 RX ADMIN — PANTOPRAZOLE SODIUM 40 MILLIGRAM(S): 20 TABLET, DELAYED RELEASE ORAL at 05:47

## 2020-07-24 NOTE — PROGRESS NOTE ADULT - ASSESSMENT
Pt is a 39 y/o female with PMHx pulmonary HTN w/ Class I/IV features (dx in 2014) on continuous Remodulin infusions c/b RV systolic failure s/p PPM, chronic asthma on Atrovent and 2L nasal cannula at night, chronic PE on Xarelto 10mg, GERD, SVT s/p ablation (5/2020), chronic sinusitis, unspecified keratoconus who p/w worsening SOB of 1 month duration. Pt is being admitted to floors for acute-on-chronic RHF and PAH with plan for careful diuresis and continued monitoring of fluid status, currently hemodynamically stable. Plan for RT heart cath today. Pt is a 41 y/o female with PMHx pulmonary HTN w/ Class I/IV features (dx in 2014) on continuous Remodulin infusions c/b RV systolic failure s/p PPM, chronic asthma on Atrovent and 2L nasal cannula at night, chronic PE on Xarelto 10mg, GERD, SVT s/p ablation (5/2020), chronic sinusitis, unspecified keratoconus who p/w worsening SOB of 1 month duration. Pt is being admitted to floors for acute-on-chronic RHF and PAH with plan for careful diuresis and continued monitoring of fluid status, currently hemodynamically stable. Fluid status is now at baseline, plan to d/c following optimization of remodulin dose.

## 2020-07-24 NOTE — DISCHARGE NOTE PROVIDER - CARE PROVIDERS DIRECT ADDRESSES
[email or web address]

## 2020-07-24 NOTE — DISCHARGE NOTE PROVIDER - PROVIDER TOKENS
PROVIDER:[TOKEN:[[id number]],SCHEDULEDAPPT:[07/31/2020],ESTABLISHEDPATIENT:[T]] PROVIDER:[TOKEN:[[id number]],SCHEDULEDAPPT:[07/31/2020],ESTABLISHEDPATIENT:[T]],PROVIDER:[TOKEN:[[id number]]]

## 2020-07-24 NOTE — PROGRESS NOTE ADULT - PROBLEM SELECTOR PLAN 3
Pt has baseline asthma, getting worse over past month, atrovent requirement now up to 5 times a day. O/N used atrovent twice.  -2L NC at night  -c/w home atrovent and spiriva Pt has baseline asthma, getting worse over past month, atrovent requirement now up to 5 times a day. O/N used atrovent twice.  -c/w home atrovent and spiriva  -was on RA overnight

## 2020-07-24 NOTE — DISCHARGE NOTE PROVIDER - NSDCFUSCHEDAPPT_GEN_ALL_CORE_FT
ROXI MARIA ; 08/10/2020 ; NPP HeartFail [address] [facility]  ROXI MARIA ; 08/06/2020 ; NPP Rad  [facility]d [facility]  ROXI MARIA ; 08/10/2020 ; NPP Med Pulm [address] [facility]  ROXI MARIA ; 08/10/2020 ; NPP HeartFail 300 [facility]

## 2020-07-24 NOTE — DISCHARGE NOTE PROVIDER - NSDCCPTREATMENT_GEN_ALL_CORE_FT
PRINCIPAL PROCEDURE  Procedure: Complete cardiac catheterization  Findings and Treatment: Cath Lab Report  Study date: 07/23/2020  Case Physician(s):  Demarco Robison M.D. Fellow: Sushil Donald M.D.  INDICATIONS: shortness of breath  HISTORY: Worsening pulmonary hypertension  PROCEDURE: Right and left heart catheterization.  DIAGNOSTIC IMPRESSIONS: Low right heart pressure  Severe pulmonary hypertension (sPAP 80mmHg, mPAP 57mmHg)  PCWP = 22mmHg with a V wave of 24mmHg  LVEDP = 4mmHg  No aortic valve stenosis  PVR = 8.57Wu  Xenia CO // CI = 4.00l/min // 2.04l/min/m2  INTERVENTIONAL IMPRESSIONS: Low right heart pressure  Severe pulmonary hypertension (sPAP 80mmHg, mPAP 57mmHg)  PCWP = 22mmHg with a V wave of 24mmHg  LVEDP = 4mmHg  No aortic valve stenosis  PVR = 8.57Wu  Xenia CO // CI = 4.00l/min // 2.04l/min/m2  Prepared and signed by  Demarco Robison MD  Signed 07/23/2020 13:39:23  Pressures:  -- Aortic Pressure (S/D/M): 90/65/76  Pressures:  -- Left Ventricle (s/edp): 90/4/--  Pressures:  -- Pulmonary Artery (S/D/M): 80/40/57  Pressures:  -- Pulmonary Capillary Wedge: 31/24/22  Pressures:  -- Right Atrium (a/v/M): 4/-1/0  Pressures:  -- Right Ventricle (s/edp): 80/4/--

## 2020-07-24 NOTE — DISCHARGE NOTE PROVIDER - HOSPITAL COURSE
Pt is a 39 y/o female with PMHx pulmonary HTN w/ Class I/IV features (dx in 2014) on continuous Remodulin infusions c/b RV systolic failure s/p PPM, chronic asthma on Atrovent and 2L nasal cannula at night, chronic PE on Xarelto 10mg, GERD, SVT s/p ablation (5/2020), chronic sinusitis, unspecified keratoconus who presented with worsening SOB of 1 month duration. Pt was being admitted to floors for acute-on-chronic RHF and PAH with plan for careful diuresis and continued monitoring of fluid status. ----- Pt is a 39 y/o female with PMHx pulmonary HTN w/ Class I/IV features (dx in 2014) on continuous Remodulin infusions c/b RV systolic failure s/p PPM, chronic asthma on Atrovent and 2L nasal cannula at night, chronic PE on Xarelto 10mg, GERD, SVT s/p ablation (5/2020), chronic sinusitis, unspecified corneal abnormality who presented with worsening SOB of 1 month duration. Pt was being admitted to floors for acute-on-chronic RHF and PAH with plan for careful diuresis and continued monitoring of fluid status. She was started on prednisone 10mg oral daily and Spiriva and continued on her home Remodulin 17 and Atrovent prn q6. She was given 40mg IV lasix in the ED on admission, placed on strict Is and Os with goal net negative 500-1000 cc daily. She got 2 more doses of lasix on 7/22 following with lasix was discontinued. Spironolactone was started 25mg oral daily. On 7/23 RHC and LHC were performed, catheterization was notable for high pulmonary pressures and low LVEDP. Following catheterization xarelto 10mg was restarted, in the morning 7/24, right thigh catheterization site was clean, dry and intact. Cardiology signed off with recommendation to discharge with lasix 40mg three times a week and spironolactone 25mg daily. Pulmonology stated they needed to check with the company to see if they can increase the Remodulin dose any further so plan is to d/c on monday. Pt is a 39 y/o female with PMHx pulmonary HTN w/ Class I/IV features (dx in 2014) on continuous Remodulin infusions c/b RV systolic failure s/p PPM, chronic asthma on Atrovent and 2L nasal cannula at night, chronic PE on Xarelto 10mg, GERD, SVT s/p ablation (5/2020), chronic sinusitis, unspecified corneal abnormality who presented with worsening SOB of 1 month duration. Pt was being admitted to floors for acute-on-chronic RHF and PAH with plan for careful diuresis and continued monitoring of fluid status. She was started on prednisone 10mg oral daily and Spiriva and continued on her home Remodulin 17 and Atrovent prn q6. She was given 40mg IV lasix in the ED on admission, placed on strict Is and Os with goal net negative 500-1000 cc daily. She got 2 more doses of lasix on 7/22 following with lasix was discontinued. Spironolactone was started 25mg oral daily. On 7/23 RHC and LHC were performed, catheterization was notable for high pulmonary pressures and low LVEDP. Following catheterization xarelto 10mg was restarted, in the morning 7/24, right thigh catheterization site was clean, dry and intact. Cardiology signed off with recommendation to discharge with lasix 40mg three times a week and spironolactone 25mg daily. Pulmonology team d/w Dr. Yoon and increased her Remodulin dose from 17 ng/kg/min to 18 ng/kg/min. She was nauseous after the dose change and was monitored for recurrence of symptoms for the next day and plan is for discharge on Monday. Pt is a 39 y/o female with PMHx pulmonary HTN w/ Class I/IV features (dx in 2014) on continuous Remodulin infusions c/b RV systolic failure s/p PPM, chronic asthma on Atrovent and 2L nasal cannula at night, chronic PE on Xarelto 10mg, GERD, SVT s/p ablation (5/2020), chronic sinusitis, unspecified corneal abnormality who presented with worsening SOB of 1 month duration. Pt was being admitted to floors for acute-on-chronic RHF and PAH with plan for careful diuresis and continued monitoring of fluid status. She was started on prednisone 10mg oral daily and Spiriva and continued on her home Remodulin 17 and Atrovent prn q6. She was given 40mg IV lasix in the ED on admission, placed on strict Is and Os with goal net negative 500-1000 cc daily. She got 2 more doses of lasix on 7/22 following which lasix was discontinued. Spironolactone was started 25mg oral daily. On 7/23 RHC and LHC were performed, catheterization was notable for high pulmonary pressures and low LVEDP. Following catheterization xarelto 10mg was restarted, in the morning 7/24, right thigh catheterization site was clean, dry and intact. Cardiology signed off with recommendation to discharge with lasix 20mg three times a week and spironolactone 25mg daily. Pulmonology team d/w Dr. Yoon and increased her Remodulin dose from 17 ng/kg/min to 18 ng/kg/min. She was nauseous after the dose change and was monitored for recurrence of symptoms. Patient tolerated dosage increase well over next 2 days and Remodulin was increased again to 19 ng/kg/min. Patient was monitored for one more day to assess response to increased Remodulin, patient remained stable and ready for discharge. Pt is a 39 y/o female with PMHx pulmonary HTN w/ Class I/IV features (dx in 2014) on continuous Remodulin infusions c/b RV systolic failure s/p PPM, chronic asthma on Atrovent and 2L nasal cannula at night, chronic PE on Xarelto 10mg, GERD, SVT s/p ablation (5/2020), chronic sinusitis, unspecified corneal abnormality who presented with worsening SOB, gradually started a month ago. Pt was being admitted to floors for acute-on-chronic RHF and severe PAH on Tele.    She was evaluated by Cardiology, started on IV diuretic and continued monitoring of fluid status. She was also started on prednisone 10mg oral daily and Spiriva and continued on her home Remodulin 17 and Atrovent prn q6 as recommended by Pulmonary. Her treatment regimens included Spironolactone 25mg daily. On 7/23/20 RHC and LHC were performed, catheterization was notable for high pulmonary pressures and low LVEDP. Following catheterization xarelto 10mg was restarted. Cardiology recommended lasix 20mg three times a week and spironolactone 25mg daily. Pulmonology team d/w Dr. Yoon and increased her Remodulin dose from 17 ng/kg/min to 19 ng/kg/min. Patient tolerated dosage increase well over 2 days. She was discharged with instructions to follow up with her Pulmonary- Dr Yoon in a few days.

## 2020-07-24 NOTE — PROGRESS NOTE ADULT - ASSESSMENT
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 S/p RHC/LHC w/ e/o severe pHTN and low LVEDP.     #SOB/pHTN  - will discuss w/ Dr. Yoon re: increasing remodulin  - c/w low dose diuresis and aldactone per cardiology  - c/w prednisone 10  - c/w ipratropium  - can use levalbuterol for further bronchodilation given less risk of KEE induced tachycardia   - please be cautious with IV beta blocker as may worsen asthma and, more importantly, worsen LV function  - recommend close monitoring of hemodynamics, PLEASE AVOID EXCESSIVE IV FLUIDS AS MAY WORSEN RV DILATION AND FURTHER COMPROMISE LV FUNCTION/CARDIAC OUTPUT  - okay to continue nocturnal O2  - recommend telemetry monitoring  - pulmonary to follow    Demarco Ortiz MD  PGY-4  PCCM Fellow  Pager [phone]    Note unofficial until cosigned by attending 59 yo F PMH WHO class I pHTN w/ history of PE (previously on riociguat, currently on remodulin), PE on half dose xarelto iso anemia, 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 S/p RHC/LHC w/ e/o severe pHTN and low LVEDP.     #SOB/pHTN  - will discuss w/ Dr. Yoon re: increasing remodulin  - recommend transition to 20mg PO lasix daily  - c/w aldactone 12.5mg daily per HF  - c/w prednisone 10  - c/w ipratropium  - can use levalbuterol for further bronchodilation given less risk of KEE induced tachycardia   - please be cautious with IV beta blocker as may worsen asthma and, more importantly, worsen LV function  - recommend close monitoring of hemodynamics, PLEASE AVOID EXCESSIVE IV FLUIDS AS MAY WORSEN RV DILATION AND FURTHER COMPROMISE LV FUNCTION/CARDIAC OUTPUT  - okay to continue nocturnal O2  - recommend telemetry monitoring  - pulmonary to follow    Demarco Ortiz MD  PGY-4  PCCM Fellow  Pager [phone]    Note unofficial until cosigned by attending

## 2020-07-24 NOTE — PROGRESS NOTE ADULT - SUBJECTIVE AND OBJECTIVE BOX
Interval Events:  Breathing overall improved. S/p RHC and LHC yesterday w/ mPAP 57 and PCWP 22 w/ LVEDP 4. Renal function and leukocytosis improved. No further episodes of emesis.    REVIEW OF SYSTEMS:  Constitutional: [x] negative [ ] fevers [ ] chills [ ] weight loss [ ] weight gain  HEENT: [x] negative [ ] dry eyes [ ] eye irritation [ ] postnasal drip [ ] nasal congestion  CV: [x] negative  [ ] chest pain [ ] orthopnea [ ] palpitations [ ] murmur  Resp: [ ] negative [ ] cough [x] shortness of breath [ ] dyspnea [ ] wheezing [ ] sputum [ ] hemoptysis  GI: [ ] negative [ ] nausea [ ] vomiting [ ] diarrhea [ ] constipation [ ] abd pain [ ] dysphagia   : [ ] negative [ ] dysuria [ ] nocturia [ ] hematuria [ ] increased urinary frequency  Musculoskeletal: [ ] negative [ ] back pain [ ] myalgias [ ] arthralgias [ ] fracture  Skin: [ ] negative [ ] rash [ ] itch  Neurological: [ ] negative [ ] headache [ ] dizziness [ ] syncope [ ] weakness [ ] numbness  Psychiatric: [ ] negative [ ] anxiety [ ] depression  Endocrine: [ ] negative [ ] diabetes [ ] thyroid problem  Hematologic/Lymphatic: [ ] negative [ ] anemia [ ] bleeding problem  Allergic/Immunologic: [ ] negative [ ] itchy eyes [ ] nasal discharge [ ] hives [ ] angioedema  [x] All other systems negative  [ ] Unable to assess ROS because ________    OBJECTIVE:  ICU Vital Signs Last 24 Hrs  T(C): 36.8 (24 Jul 2020 05:40), Max: 37.3 (23 Jul 2020 09:21)  T(F): 98.3 (24 Jul 2020 05:40), Max: 99.1 (23 Jul 2020 09:21)  HR: 103 (24 Jul 2020 05:40) (85 - 108)  BP: 97/61 (24 Jul 2020 05:40) (97/61 - 119/81)  BP(mean): --  ABP: --  ABP(mean): --  RR: 18 (24 Jul 2020 05:40) (16 - 18)  SpO2: 94% (24 Jul 2020 05:40) (92% - 95%)        07-23 @ 07:01  -  07-24 @ 07:00  --------------------------------------------------------  IN: 420 mL / OUT: 500 mL / NET: -80 mL      CAPILLARY BLOOD GLUCOSE          PHYSICAL EXAM:  GEN: Middle aged female, NAD  HEENT: EOMI, MMM  CV: Tachycardic w/o mrg  PULM: CTAB  ABD: Soft, NT, ND  EXT: WWP, no edema    HOSPITAL MEDICATIONS:  MEDICATIONS  (STANDING):  pantoprazole    Tablet 40 milliGRAM(s) Oral before breakfast  predniSONE   Tablet 10 milliGRAM(s) Oral daily  Remodulin (Treprostinil) vial 50 milliGRAM(s),IV Solution 20 milliLiter(s) 50 milliGRAM(s) (17 NANOGram(s)/kG/Min) IV Continuous <Continuous>  rivaroxaban 10 milliGRAM(s) Oral with dinner  spironolactone 25 milliGRAM(s) Oral daily  tiotropium 18 MICROgram(s) Capsule 1 Capsule(s) Inhalation daily    MEDICATIONS  (PRN):  ipratropium    for Nebulization 500 MICROGram(s) Nebulizer every 6 hours PRN Shortness of Breath  levalbuterol Inhalation 0.63 milliGRAM(s) Inhalation every 4 hours PRN SOB/wheezing      LABS:                        11.5   9.93  )-----------( 415      ( 24 Jul 2020 06:56 )             39.3     Hgb Trend: 11.5<--, 11.9<--, 12.1<--, 11.1<--  07-24    134<L>  |  100  |  19  ----------------------------<  87  3.6   |  22  |  1.10    Ca    8.9      24 Jul 2020 06:56  Phos  3.8     07-24  Mg     2.0     07-24    TPro  7.1  /  Alb  4.3  /  TBili  0.8  /  DBili  x   /  AST  14  /  ALT  6<L>  /  AlkPhos  57  07-24    Creatinine Trend: 1.10<--, 1.24<--, 1.22<--, 1.07<--            MICROBIOLOGY:       RADIOLOGY:  [ ] Reviewed and interpreted by me    PULMONARY FUNCTION TESTS:    EKG:

## 2020-07-24 NOTE — PROGRESS NOTE ADULT - ATTENDING COMMENTS
Patient seen and examined with the resident and team at bedside, Labs and vital are reviewed. I agree with above unless noted below    A/P; 61 Y/O/F with  PMHx  class I pHTN ,currently on Remodulin, PE on half dose xarelto, , bradycardia s/p , AVRNT s/p ablation (5/2020), asthma, chronic right sided systolic heart failure  with Acute on chronic Right sided systolic heart failure in a setting P HTN   s/p cath consistent with severe pulmonary HTN   clinically euvolemic   on spironolactone and Lasix 3xweek   await Pulm/ Dr. Yoon recommendation regarding increasing Remodulin and DC planning

## 2020-07-24 NOTE — PROGRESS NOTE ADULT - PROBLEM SELECTOR PLAN 1
Pt has known RHF d/t pulmonary HTN. Pt c/o SOB for 1 month possibly 2/2 fluid overload in the setting of recent hospitalization w/fluids given. Pt at baseline HR and BP but given SOB and lightheadedness on exertion, TTE with RV enlargement and LV dysfunction possibly 2/2 to RHF. Elevated BNP with troponins 6<-7<-10<-9.   Plan - Pulm and Cardio recs appreciated greatly  - initial 40mg Lasix IV in ED;  - STRICT Is and Os with goal net negative 500 to 1000cc daily  close hemodynamic monitoring  - avoid excess IV fluids, may worsen RV dilation and further compromise LV function/cardiac output  - VBG for evaluation of end organ hypoperfusion  - re-initiate 10mg prednisone daily, no signs of acute asthma exacerbation  - prn ipratropium  - okay to defer albuterol iso tachycardia  - avoid IV beta blocker  - Close f/u renal function in setting of dye load for CT.  - after initial lasix, start 40mg lasix bid  - Plan for RT heart cath. Will f/u on recs Pt has known RHF d/t pulmonary HTN. Pt c/o SOB for 1 month possibly 2/2 fluid overload in the setting of recent hospitalization w/fluids given. Pt at baseline HR and BP but given SOB and lightheadedness on exertion, TTE with RV enlargement and LV dysfunction possibly 2/2 to RHF. Elevated BNP with troponins 6<-7<-10<-9.   Plan - Pulm and Cardio recs appreciated greatly  - initial 40mg Lasix IV in ED;  - STRICT Is and Os with goal net negative 500 to 1000cc daily  close hemodynamic monitoring  - avoid excess IV fluids, may worsen RV dilation and further compromise LV function/cardiac output  - VBG for evaluation of end organ hypoperfusion  - re-initiate 10mg prednisone daily, no signs of acute asthma exacerbation  - prn ipratropium  - okay to defer albuterol iso tachycardia  - avoid IV beta blocker  - Close f/u renal function in setting of dye load for CT.  - after initial lasix, start 40mg lasix bid  - RHC 7/23: RA 4, RV 80/4 PA 80/40 57 PCWP 22 (24) PA 51, A0 91 Xenia 4.1/2.0   PVR wedge 8.7, PVR LVEDP 13.3  - Recs from Cardiology: Cath shows severe pulmonary HTN, f/u with Pulm recs re Remodulin dose. RA pressure is low. d/c lasix. could d/c on lasix 40 tiw and aldactone 25.  -Lasix d'c'd yesterday, started on aldactone 25mg PO daily Pt has known RHF d/t pulmonary HTN. Pt c/o SOB for 1 month possibly 2/2 fluid overload in the setting of recent hospitalization w/fluids given. Pt at baseline HR and BP but given SOB and lightheadedness on exertion, TTE with RV enlargement and LV dysfunction possibly 2/2 to RHF. Elevated BNP with troponins 6<-7<-10<-9.   Plan - Pulm and Cardio recs appreciated greatly  - initial 40mg Lasix IV in ED;  - STRICT Is and Os with goal net negative 500 to 1000cc daily  close hemodynamic monitoring  - avoid excess IV fluids, may worsen RV dilation and further compromise LV function/cardiac output  - VBG for evaluation of end organ hypoperfusion  - re-initiate 10mg prednisone daily, no signs of acute asthma exacerbation  - prn ipratropium  - okay to defer albuterol iso tachycardia  - avoid IV beta blocker  - Close f/u renal function in setting of dye load for CT.  - after initial lasix, start 40mg lasix bid  - RHC 7/23: RA 4, RV 80/4 PA 80/40 57 PCWP 22 (24) PA 51, A0 91 Xenia 4.1/2.0   PVR wedge 8.7, PVR LVEDP 13.3  - Recs from Cardiology: Cath shows severe pulmonary HTN, f/u with Pulm recs re Remodulin dose. RA pressure is low. d/c lasix. could d/c on lasix 40 tiw and aldactone 25.  -Lasix d'c'd yesterday, started on aldactone 25mg PO daily  -BUN/Cr improved Pt has known RHF d/t pulmonary HTN. Pt c/o SOB for 1 month possibly 2/2 fluid overload in the setting of recent hospitalization w/fluids given. Pt at baseline HR and BP but given SOB and lightheadedness on exertion, TTE with RV enlargement and LV dysfunction possibly 2/2 to RHF. Elevated BNP with troponins 6<-7<-10<-9.   Plan - Pulm and Cardio recs appreciated greatly  - initial 40mg Lasix IV in ED;  - STRICT Is and Os with goal net negative 500 to 1000cc daily  close hemodynamic monitoring  - avoid excess IV fluids, may worsen RV dilation and further compromise LV function/cardiac output  - VBG for evaluation of end organ hypoperfusion  - re-initiate 10mg prednisone daily, no signs of acute asthma exacerbation  - prn ipratropium  - okay to defer albuterol iso tachycardia  - avoid IV beta blocker  - Close f/u renal function in setting of dye load for CT.  - after initial lasix, start 40mg lasix bid  - RHC 7/23: RA 4, RV 80/4 PA 80/40 57 PCWP 22 (24) PA 51, A0 91 Xenia 4.1/2.0   PVR wedge 8.7, PVR LVEDP 13.3  - Recs from Cardiology: Cath shows severe pulmonary HTN, f/u with Pulm recs re Remodulin dose. RA pressure is low. d/c lasix. could d/c on lasix 40 tiw and aldactone 25.  -Lasix d'c'd yesterday, started on aldactone 25mg PO daily, plan to d/c on lasix three times a week and aldactone daily.  -BUN/Cr improved TTE with RV enlargement and LV dysfunction possibly 2/2 to RHF. Elevated BNP with troponins 6<-7<-10<-9.   -continue Prednisone  - prn ipratropium  - s/p RHC 7/23: RA 4, RV 80/4 PA 80/40 57 PCWP 22 (24) PA 51, A0 91 Xenia 4.1/2.0   PVR wedge 8.7, PVR LVEDP 13.3  - Recs from Cardiology: Cath shows severe pulmonary HTN, f/u with Pulm recs re Remodulin dose. RA pressure is low. d/c lasix. could d/c on lasix 40 tiw and aldactone 25.  -Lasix d'c'd yesterday, started on aldactone 25mg PO daily, plan to d/c on lasix three times a week and aldactone daily.  -BUN/Cr improved

## 2020-07-24 NOTE — PROGRESS NOTE ADULT - PROBLEM SELECTOR PLAN 2
Pt c/o SOB for 1 month possibly 2/2 fluid overload in the setting of recent hospitalization w/fluids given. Pt at baseline HR and BP but given SOB and lightheadedness on exertion, TTE with RV enlargement and LV dysfunction possibly 2/2 to RHF. Elevated BNP with troponins 6<-7<-10<-9.   Plan - Pulm and Cardio recs appreciated greatly  - initial 40mg Lasix IV in ED;  - STRICT Is and Os with goal net negative 500 to 1000cc daily  close hemodynamic monitoring  - avoid excess IV fluids, may worsen RV dilation and further compromise LV function/cardiac output  - VBG for evaluation of end organ hypoperfusion  - re-initiate 10mg prednisone daily, no signs of acute asthma exacerbation  - prn ipratropium  - okay to defer albuterol iso tachycardia  - avoid IV beta blocker  - Close f/u renal function in setting of dye load for CT.  - Continue home remodulin 17ng/kg/ml Pt c/o SOB for 1 month possibly 2/2 fluid overload in the setting of recent hospitalization w/fluids given. Pt at baseline HR and BP but given SOB and lightheadedness on exertion, TTE with RV enlargement and LV dysfunction possibly 2/2 to RHF. Elevated BNP with troponins 6<-7<-10<-9.   Plan - Pulm and Cardio recs appreciated greatly  - initial 40mg Lasix IV in ED;  - STRICT Is and Os with goal net negative 500 to 1000cc daily  close hemodynamic monitoring  - avoid excess IV fluids, may worsen RV dilation and further compromise LV function/cardiac output  - VBG for evaluation of end organ hypoperfusion  - re-initiate 10mg prednisone daily, no signs of acute asthma exacerbation  - prn ipratropium  - okay to defer albuterol iso tachycardia  - avoid IV beta blocker  - Close f/u renal function in setting of dye load for CT.  - Continue home remodulin 17ng/kg/ml  -f/u with Dr. Yoon RE home remodulin. - As above  - Continue home remodulin 17ng/kg/ml  -f/u with Dr. Yoon RE home remodulin.

## 2020-07-24 NOTE — PROGRESS NOTE ADULT - PROBLEM SELECTOR PLAN 4
Pt has chronic pulmonary embolism, CT-Angio showed distal pulmonary arteries are diminished in size.  Plan:  Holding home xarelto 10mg Daily in the setting of cardiac cath Pt has chronic pulmonary embolism, CT-Angio showed distal pulmonary arteries are diminished in size.  Plan:  restarted home xarelto overnight 10mg Daily Pt has chronic pulmonary embolism, CT-Angio showed distal pulmonary arteries are diminished in size.  Plan:  continue  xarelto overnight 10mg Daily

## 2020-07-24 NOTE — PROGRESS NOTE ADULT - PROBLEM SELECTOR PLAN 9
Nodule in left breast - plan to inform patient and have them follow up outpatient. Nodule in left breast - plan to inform patient and have her follow up outpatient.

## 2020-07-24 NOTE — PROGRESS NOTE ADULT - PROBLEM SELECTOR PLAN 8
DVT ppx- holding Xarelto in the setting of cardiac cath  DASH diet  telemetry  protonix 40mg daily DVT ppx- holding Xarelto in the setting of cardiac cath - added yesterday night post cardiac cath  DASH diet  telemetry  protonix 40mg daily

## 2020-07-24 NOTE — DISCHARGE NOTE PROVIDER - CARE PROVIDER_API CALL
Vivian Yoon  CRITICAL CARE MEDICINE  [address] [place] [address]  Phone: [phone]  Fax: [phone]  Established Patient  Scheduled Appointment: 07/31/2020 Vivian Yoon  CRITICAL CARE MEDICINE  [address] [place] [address]  Phone: [phone]  Fax: [phone]  Established Patient  Scheduled Appointment: 07/31/2020    Girish Garcia  ADV HEART FAIL [facility] CARDIO  [address] [place] [id number]  Phone: [phone]  Fax: [phone]  Follow Up Time:

## 2020-07-24 NOTE — DISCHARGE NOTE PROVIDER - NSDCMRMEDTOKEN_GEN_ALL_CORE_FT
ferrous sulfate 325 mg (65 mg elemental iron) oral tablet: 1 tab(s) orally 2 times a day  ipratropium 500 mcg/2.5 mL inhalation solution: 2.5 milliliter(s) inhaled , As Needed  omeprazole 40 mg oral delayed release capsule: 1 cap(s) orally once a day  Remodulin 2.5 mg/mL injectable solution: 3 milliliter(s) subcutaneous once every 3 days    **NOTE: Medication picked up from specialty pharmacy**  Xarelto 10 mg oral tablet: 1 tab(s) orally once a day ferrous sulfate 325 mg (65 mg elemental iron) oral tablet: 1 tab(s) orally 2 times a day  ipratropium 500 mcg/2.5 mL inhalation solution: 2.5 milliliter(s) inhaled , As Needed  omeprazole 40 mg oral delayed release capsule: 1 cap(s) orally once a day  predniSONE 10 mg oral tablet: 1 tab(s) orally once a day  spironolactone 25 mg oral tablet: 1 tab(s) orally once a day  tiotropium 18 mcg inhalation capsule: 1 cap(s) inhaled once a day  Xarelto 10 mg oral tablet: 1 tab(s) orally once a day ferrous sulfate 325 mg (65 mg elemental iron) oral tablet: 1 tab(s) orally 2 times a day  ipratropium 500 mcg/2.5 mL inhalation solution: 2.5 milliliter(s) inhaled , As Needed  Lasix 20 mg oral tablet: 1 tab(s) orally Monday, Wednesday, and Friday   omeprazole 40 mg oral delayed release capsule: 1 cap(s) orally once a day  predniSONE 10 mg oral tablet: 1 tab(s) orally once a day  Remodulin 5 mg/mL injectable solution: Please continue with your remodulin at the recommended dose of 19 ng/kg/min  spironolactone 25 mg oral tablet: 1 tab(s) orally once a day  tiotropium 18 mcg inhalation capsule: 1 cap(s) inhaled once a day  Xarelto 10 mg oral tablet: 1 tab(s) orally once a day

## 2020-07-24 NOTE — PROGRESS NOTE ADULT - PROBLEM SELECTOR PLAN 6
Pt currently experiencing sinus pain, no discharge  -Nasal corticosteroid spray Pt was experiencing sinus pain at the time of admission, resolved  recommend follow-up outpatient to prevent further episodes Pt was experiencing sinus pain at the time of admission, resolved  recommend follow-up outpatient to prevent further episodes  leukocytosis improved

## 2020-07-24 NOTE — PROGRESS NOTE ADULT - PROBLEM SELECTOR PLAN 5
Family hx of anemia, currently at baseline, Hg   Plan: Monitor CBC for sudden decrease from baseline, Hg11 on admission. Family hx of anemia, currently at baseline   Plan: Monitor CBC for sudden decrease from baseline, Hg11 on admission.  H&H stable 7/24

## 2020-07-24 NOTE — DISCHARGE NOTE PROVIDER - NSDCCPCAREPLAN_GEN_ALL_CORE_FT
PRINCIPAL DISCHARGE DIAGNOSIS  Diagnosis: Pulmonary hypertension  Assessment and Plan of Treatment: You have pulmonary arterial hypertension diagnosed in 2014. During this visit you had fluid removed with diuretic agents and you had a catheterization test of your heart in order to determine the pressures in your heart and lungs. It has found that you the pressures in your lungs are high and your right heart is not pumping effectively due to this pressure.  Please continue your Remodulin as per Dr. Yoon's recommendations.      SECONDARY DISCHARGE DIAGNOSES  Diagnosis: Asthma  Assessment and Plan of Treatment: You have asthma and were on Atrovent at home, asthma attacks have been inc    Diagnosis: Breast nodule  Assessment and Plan of Treatment: You have a nodule in your breast found on CT. PRINCIPAL DISCHARGE DIAGNOSIS  Diagnosis: Pulmonary hypertension  Assessment and Plan of Treatment: You have pulmonary arterial hypertension diagnosed in 2014. During this visit you had fluid removed with diuretic agents and you had a catheterization test of your heart in order to determine the pressures in your heart and lungs. It has found that you the pressures in your lungs are high and your right heart is not pumping effectively due to this pressure.  Please continue your Remodulin as per Dr. Yoon's recommendations.      SECONDARY DISCHARGE DIAGNOSES  Diagnosis: Asthma  Assessment and Plan of Treatment: You have asthma and your asthma symptoms had worsened over the last month. They have been well-controlled during your hospital stay and we encourage you to keep taking your home asthma medications and follow up with your pulmonologist Dr. Yoon to optimize your asthma regimen.    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 For this condition, please take the Lasix and Aldactone as prescribed to jazlyn you from getting Please continue taking your Remodulin as prescribed by Dr. Yoon.    Diagnosis: Breast nodule  Assessment and Plan of Treatment: You have a nodule in your breast found on CT. Please follow up with primary care doctor so they can evaluate the nodule.    Diagnosis: Pulmonary hypertension  Assessment and Plan of Treatment: Pulmonary hypertension    Diagnosis: Corneal disorder  Assessment and Plan of Treatment: You stated you had a corneal disorder, possibly keratoconus. Please follow up with your ophthalmologist yearly or as per usual.    Diagnosis: Sinusitis  Assessment and Plan of Treatment: On admission you reported recurring sinusitis. Please follow up with your primary care doctor in order to prevent recurring episodes of sinusitis. PRINCIPAL DISCHARGE DIAGNOSIS  Diagnosis: Pulmonary hypertension  Assessment and Plan of Treatment: You have pulmonary arterial hypertension diagnosed in 2014. During this visit you had fluid removed with diuretic agents and you had a catheterization test of your heart in order to determine the pressures in your heart and lungs. It has found that you the pressures in your lungs are high and your right heart is not pumping effectively due to this pressure.  Please continue your Remodulin as per Dr. Yoon's recommendations.      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 For this condition, please take the Lasix and Aldactone as prescribed to keep your right heart from being overwhelmed by too high a fluid volume. Please also continue taking your Remodulin as prescribed by Dr. Yoon.    Diagnosis: Pulmonary hypertension  Assessment and Plan of Treatment: Pulmonary hypertension    Diagnosis: Sinusitis  Assessment and Plan of Treatment: On admission you reported recurring sinusitis. Please follow up with your primary care doctor in order to prevent recurring episodes of sinusitis.    Diagnosis: Corneal disorder  Assessment and Plan of Treatment: You stated you had a corneal disorder, possibly keratoconus. Please follow up with your ophthalmologist yearly or as per usual.    Diagnosis: Breast nodule  Assessment and Plan of Treatment: You have a nodule in your breast found on CT. Please follow up with primary care doctor so they can evaluate the nodule.    Diagnosis: Asthma  Assessment and Plan of Treatment: You have asthma and your asthma symptoms had worsened over the last month. They have been well-controlled during your hospital stay and we encourage you to keep taking your home asthma medications and follow up with your pulmonologist Dr. Yoon to optimize your asthma regimen. PRINCIPAL DISCHARGE DIAGNOSIS  Diagnosis: Pulmonary hypertension  Assessment and Plan of Treatment: You have pulmonary arterial hypertension diagnosed in 2014. During this visit you had fluid removed with diuretic agents and you had a catheterization test of your heart in order to determine the pressures in your heart and lungs. It has found that you the pressures in your lungs are high and your right heart is not pumping effectively due to this pressure.  Please continue your Remodulin as per Dr. Yoon's recommendations.      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 For this condition, please take the Lasix and Aldactone as prescribed to keep your right heart from being overwhelmed by too high a fluid volume. Please also continue taking your Remodulin as prescribed by Dr. Yoon.    Diagnosis: Sinusitis  Assessment and Plan of Treatment: On admission you reported recurring sinusitis. Please follow up with your primary care doctor in order to prevent recurring episodes of sinusitis.    Diagnosis: Corneal disorder  Assessment and Plan of Treatment: You stated you had a corneal disorder, possibly keratoconus. Please follow up with your ophthalmologist yearly or as per usual.    Diagnosis: Breast nodule  Assessment and Plan of Treatment: You have a nodule in your breast found on CT. Please follow up with primary care doctor so they can evaluate the nodule.    Diagnosis: Asthma  Assessment and Plan of Treatment: You have asthma and your asthma symptoms had worsened over the last month. They have been well-controlled during your hospital stay and we encourage you to keep taking your home asthma medications and follow up with your pulmonologist Dr. Yoon to optimize your asthma regimen.

## 2020-07-24 NOTE — PROGRESS NOTE ADULT - ATTENDING COMMENTS
Patient seen and examined, data reviewed.  Cath showed elevated PA pressures with low LVEDP.  As she has had increased symptoms will likely increase remodulin dose.  Will discuss with dr. Yoon.

## 2020-07-24 NOTE — PROGRESS NOTE ADULT - SUBJECTIVE AND OBJECTIVE BOX
ROXI MARIA  40y  Female    Michelle Cannon MD PGY1 [phone]    Subjective: Pt reports use of atrovent once overnight and once in the morning, SOB/chest tightness at baseline, occasional productive cough with clear sputum. Palpitations increased to baseline which per patient indicates reduction of fluid overload. Pt requests change from diabetic diet given she is not diabetic. She denies n/v/d/c today, denies HA, vision change, chest pain, pain/bleeding at catheter site on right thigh.     Meds    MEDICATIONS  (STANDING):  pantoprazole    Tablet 40 milliGRAM(s) Oral before breakfast  predniSONE   Tablet 10 milliGRAM(s) Oral daily  Remodulin (Treprostinil) vial 50 milliGRAM(s),IV Solution 20 milliLiter(s) 50 milliGRAM(s) (17 NANOGram(s)/kG/Min) IV Continuous <Continuous>  rivaroxaban 10 milliGRAM(s) Oral with dinner  spironolactone 25 milliGRAM(s) Oral daily  tiotropium 18 MICROgram(s) Capsule 1 Capsule(s) Inhalation daily    MEDICATIONS  (PRN):  ipratropium    for Nebulization 500 MICROGram(s) Nebulizer every 6 hours PRN Shortness of Breath  levalbuterol Inhalation 0.63 milliGRAM(s) Inhalation every 4 hours PRN SOB/wheezing        Vital Signs Last 24 Hrs  T(C): 36.8 (24 Jul 2020 05:40), Max: 37.3 (23 Jul 2020 09:21)  T(F): 98.3 (24 Jul 2020 05:40), Max: 99.1 (23 Jul 2020 09:21)  HR: 103 (24 Jul 2020 05:40) (85 - 108)  BP: 97/61 (24 Jul 2020 05:40) (97/61 - 119/81)  BP(mean): --  RR: 18 (24 Jul 2020 05:40) (16 - 18)  SpO2: 94% (24 Jul 2020 05:40) (92% - 95%)     PHYSICAL EXAM:  GENERAL: NAD, well-developed  HEAD:  Atraumatic, Normocephalic  EYES: EOMI, conjunctiva and sclera clear  NECK: Supple, No JVD  CHEST/LUNG: occasional crackles at base of lungs; mild wheeze R>L  HEART: S1,S2, regular rate and rhythm; no murmurs, rubs, or gallops  ABDOMEN: Soft, Nontender, nondistended; Bowel sounds present  EXTREMITIES:  well-perfused, No clubbing, cyanosis, 1+ b/l LE edema, right thigh cathetic site c/d/i  PSYCH: AAOx3  NEUROLOGY: non-focal  SKIN: Mild rash at ekg lead tape site, left thigh skin tag, round pedunculated, no erythema or induration (1 in. diameter)        Consultant(s) Notes Reviewed:  [x ] YES  [ ] NO  Care Discussed with Consultants/Other Providers [ x] YES  [ ] NO      07-24    134<L>  |  100  |  19  ----------------------------<  87  3.6   |  22  |  1.10  07-23    135  |  100  |  25<H>  ----------------------------<  90  3.6   |  20<L>  |  1.24  07-22    137  |  101  |  16  ----------------------------<  81  3.4<L>   |  22  |  1.22    Ca    8.9      24 Jul 2020 06:56  Ca    8.6      23 Jul 2020 06:29  Ca    9.1      22 Jul 2020 05:24  Phos  3.8     07-24  Mg     2.0     07-24    TPro  7.1  /  Alb  4.3  /  TBili  0.8  /  DBili  x   /  AST  14  /  ALT  6<L>  /  AlkPhos  57  07-24  TPro  7.4  /  Alb  4.3  /  TBili  0.5  /  DBili  x   /  AST  15  /  ALT  7<L>  /  AlkPhos  64  07-23  TPro  7.8  /  Alb  4.6  /  TBili  0.7  /  DBili  x   /  AST  14  /  ALT  8<L>  /  AlkPhos  61  07-22    Magnesium, Serum: 2.0 mg/dL (07-24-20 @ 06:56)  Magnesium, Serum: 1.9 mg/dL (07-23-20 @ 06:29)  Magnesium, Serum: 2.0 mg/dL (07-22-20 @ 05:24)    Phosphorus Level, Serum: 3.8 mg/dL (07-24-20 @ 06:56)  Phosphorus Level, Serum: 5.1 mg/dL (07-23-20 @ 06:29)  Phosphorus Level, Serum: 4.3 mg/dL (07-22-20 @ 05:24)                                              11.5   9.93  )-----------( 415      ( 24 Jul 2020 06:56 )             39.3                         11.9   12.26 )-----------( 452      ( 23 Jul 2020 06:29 )             40.8                         12.1   9.37  )-----------( 450      ( 22 Jul 2020 05:24 )             41.2     LIVER FUNCTIONS - ( 24 Jul 2020 06:56 )  Alb: 4.3 g/dL / Pro: 7.1 g/dL / ALK PHOS: 57 U/L / ALT: 6 U/L / AST: 14 U/L / GGT: x           CAPILLARY BLOOD GLUCOSE        Blood Gas Source Venous: Venous (07-22-20 @ 05:27)        LIVER FUNCTIONS - ( 24 Jul 2020 06:56 )  Alb: 4.3 g/dL / Pro: 7.1 g/dL / ALK PHOS: 57 U/L / ALT: 6 U/L / AST: 14 U/L / GGT: x           Microbiology ROXI MARIA  40y  Female    Michelle Cannon MD PGY1 [phone]    Subjective: Pt reports use of atrovent once overnight and once in the morning, SOB/chest tightness at baseline, occasional productive cough with clear sputum. Palpitations increased to baseline which per patient indicates reduction of fluid overload. She denies n/v/d/c today, denies HA, vision change, chest pain, pain/bleeding at catheter site on right thigh.     Meds    MEDICATIONS  (STANDING):  pantoprazole    Tablet 40 milliGRAM(s) Oral before breakfast  predniSONE   Tablet 10 milliGRAM(s) Oral daily  Remodulin (Treprostinil) vial 50 milliGRAM(s),IV Solution 20 milliLiter(s) 50 milliGRAM(s) (17 NANOGram(s)/kG/Min) IV Continuous <Continuous>  rivaroxaban 10 milliGRAM(s) Oral with dinner  spironolactone 25 milliGRAM(s) Oral daily  tiotropium 18 MICROgram(s) Capsule 1 Capsule(s) Inhalation daily    MEDICATIONS  (PRN):  ipratropium    for Nebulization 500 MICROGram(s) Nebulizer every 6 hours PRN Shortness of Breath  levalbuterol Inhalation 0.63 milliGRAM(s) Inhalation every 4 hours PRN SOB/wheezing        Vital Signs Last 24 Hrs  T(C): 36.8 (24 Jul 2020 05:40), Max: 37.3 (23 Jul 2020 09:21)  T(F): 98.3 (24 Jul 2020 05:40), Max: 99.1 (23 Jul 2020 09:21)  HR: 103 (24 Jul 2020 05:40) (85 - 108)  BP: 97/61 (24 Jul 2020 05:40) (97/61 - 119/81)  BP(mean): --  RR: 18 (24 Jul 2020 05:40) (16 - 18)  SpO2: 94% (24 Jul 2020 05:40) (92% - 95%)     PHYSICAL EXAM:  GENERAL: NAD, well-developed  HEAD:  Atraumatic, Normocephalic  EYES: EOMI, conjunctiva and sclera clear  NECK: Supple, No JVD  CHEST/LUNG: occasional crackles at base of lungs; mild wheeze R>L  HEART: S1,S2, regular rate and rhythm; no murmurs, rubs, or gallops  ABDOMEN: Soft, Nontender, nondistended; Bowel sounds present  EXTREMITIES:  well-perfused, No clubbing, cyanosis, 1+ b/l LE edema, right thigh cathetic site c/d/i  PSYCH: AAOx3  NEUROLOGY: non-focal  SKIN: Mild rash at ekg lead tape site, left thigh skin tag, round pedunculated, no erythema or induration (1 in. diameter)        Consultant(s) Notes Reviewed:  [x ] YES  [ ] NO  Care Discussed with Consultants/Other Providers [ x] YES  [ ] NO      07-24    134<L>  |  100  |  19  ----------------------------<  87  3.6   |  22  |  1.10  07-23    135  |  100  |  25<H>  ----------------------------<  90  3.6   |  20<L>  |  1.24  07-22    137  |  101  |  16  ----------------------------<  81  3.4<L>   |  22  |  1.22    Ca    8.9      24 Jul 2020 06:56  Ca    8.6      23 Jul 2020 06:29  Ca    9.1      22 Jul 2020 05:24  Phos  3.8     07-24  Mg     2.0     07-24    TPro  7.1  /  Alb  4.3  /  TBili  0.8  /  DBili  x   /  AST  14  /  ALT  6<L>  /  AlkPhos  57  07-24  TPro  7.4  /  Alb  4.3  /  TBili  0.5  /  DBili  x   /  AST  15  /  ALT  7<L>  /  AlkPhos  64  07-23  TPro  7.8  /  Alb  4.6  /  TBili  0.7  /  DBili  x   /  AST  14  /  ALT  8<L>  /  AlkPhos  61  07-22    Magnesium, Serum: 2.0 mg/dL (07-24-20 @ 06:56)  Magnesium, Serum: 1.9 mg/dL (07-23-20 @ 06:29)  Magnesium, Serum: 2.0 mg/dL (07-22-20 @ 05:24)    Phosphorus Level, Serum: 3.8 mg/dL (07-24-20 @ 06:56)  Phosphorus Level, Serum: 5.1 mg/dL (07-23-20 @ 06:29)  Phosphorus Level, Serum: 4.3 mg/dL (07-22-20 @ 05:24)                                              11.5   9.93  )-----------( 415      ( 24 Jul 2020 06:56 )             39.3                         11.9   12.26 )-----------( 452      ( 23 Jul 2020 06:29 )             40.8                         12.1   9.37  )-----------( 450      ( 22 Jul 2020 05:24 )             41.2     LIVER FUNCTIONS - ( 24 Jul 2020 06:56 )  Alb: 4.3 g/dL / Pro: 7.1 g/dL / ALK PHOS: 57 U/L / ALT: 6 U/L / AST: 14 U/L / GGT: x           CAPILLARY BLOOD GLUCOSE        Blood Gas Source Venous: Venous (07-22-20 @ 05:27)        LIVER FUNCTIONS - ( 24 Jul 2020 06:56 )  Alb: 4.3 g/dL / Pro: 7.1 g/dL / ALK PHOS: 57 U/L / ALT: 6 U/L / AST: 14 U/L / GGT: x           Microbiology

## 2020-07-24 NOTE — PROGRESS NOTE ADULT - CONSTITUTIONAL DETAILS
Received refill request for:  Metoprolol Succinate  Last OV was: 3/17/2020 with LEESA Lacy  Labs/EKG: n/a  F/U scheduled: orders in Epic for 7/2020, not yet scheduled.  Note to pharmacy and refill letter sent to pt  New script sent to: [facility]  
well-groomed/no distress/well-developed
well-developed/no distress/obese/well-groomed

## 2020-07-25 LAB
ANION GAP SERPL CALC-SCNC: 13 MMOL/L — SIGNIFICANT CHANGE UP (ref 5–17)
BUN SERPL-MCNC: 17 MG/DL — SIGNIFICANT CHANGE UP (ref 7–23)
CALCIUM SERPL-MCNC: 9 MG/DL — SIGNIFICANT CHANGE UP (ref 8.4–10.5)
CHLORIDE SERPL-SCNC: 102 MMOL/L — SIGNIFICANT CHANGE UP (ref 96–108)
CO2 SERPL-SCNC: 21 MMOL/L — LOW (ref 22–31)
CREAT SERPL-MCNC: 1.14 MG/DL — SIGNIFICANT CHANGE UP (ref 0.5–1.3)
GLUCOSE SERPL-MCNC: 94 MG/DL — SIGNIFICANT CHANGE UP (ref 70–99)
HCT VFR BLD CALC: 40.4 % — SIGNIFICANT CHANGE UP (ref 34.5–45)
HGB BLD-MCNC: 11.7 G/DL — SIGNIFICANT CHANGE UP (ref 11.5–15.5)
MAGNESIUM SERPL-MCNC: 2.2 MG/DL — SIGNIFICANT CHANGE UP (ref 1.6–2.6)
MCHC RBC-ENTMCNC: 18.1 PG — LOW (ref 27–34)
MCHC RBC-ENTMCNC: 29 GM/DL — LOW (ref 32–36)
MCV RBC AUTO: 62.3 FL — LOW (ref 80–100)
NRBC # BLD: 0 /100 WBCS — SIGNIFICANT CHANGE UP (ref 0–0)
PHOSPHATE SERPL-MCNC: 3.5 MG/DL — SIGNIFICANT CHANGE UP (ref 2.5–4.5)
PLATELET # BLD AUTO: 444 K/UL — HIGH (ref 150–400)
POTASSIUM SERPL-MCNC: 4 MMOL/L — SIGNIFICANT CHANGE UP (ref 3.5–5.3)
POTASSIUM SERPL-SCNC: 4 MMOL/L — SIGNIFICANT CHANGE UP (ref 3.5–5.3)
RBC # BLD: 6.48 M/UL — HIGH (ref 3.8–5.2)
RBC # FLD: 19.9 % — HIGH (ref 10.3–14.5)
SODIUM SERPL-SCNC: 136 MMOL/L — SIGNIFICANT CHANGE UP (ref 135–145)
WBC # BLD: 9.81 K/UL — SIGNIFICANT CHANGE UP (ref 3.8–10.5)
WBC # FLD AUTO: 9.81 K/UL — SIGNIFICANT CHANGE UP (ref 3.8–10.5)

## 2020-07-25 PROCEDURE — 99233 SBSQ HOSP IP/OBS HIGH 50: CPT | Mod: GC

## 2020-07-25 PROCEDURE — 99232 SBSQ HOSP IP/OBS MODERATE 35: CPT | Mod: GC

## 2020-07-25 RX ADMIN — Medication 10 MILLIGRAM(S): at 05:35

## 2020-07-25 RX ADMIN — RIVAROXABAN 10 MILLIGRAM(S): KIT at 17:37

## 2020-07-25 RX ADMIN — PANTOPRAZOLE SODIUM 40 MILLIGRAM(S): 20 TABLET, DELAYED RELEASE ORAL at 05:35

## 2020-07-25 RX ADMIN — Medication 500 MICROGRAM(S): at 05:12

## 2020-07-25 RX ADMIN — Medication 500 MICROGRAM(S): at 13:07

## 2020-07-25 RX ADMIN — SPIRONOLACTONE 25 MILLIGRAM(S): 25 TABLET, FILM COATED ORAL at 05:35

## 2020-07-25 NOTE — PROGRESS NOTE ADULT - PROBLEM SELECTOR PLAN 1
TTE with RV enlargement and LV dysfunction possibly 2/2 to RHF. Elevated BNP with troponins 6<-7<-10<-9.   -continue Prednisone  - prn ipratropium  - s/p RHC 7/23: RA 4, RV 80/4 PA 80/40 57 PCWP 22 (24) PA 51, A0 91 Xenia 4.1/2.0   PVR wedge 8.7, PVR LVEDP 13.3  - Recs from Cardiology: Cath shows severe pulmonary HTN, f/u with Pulm recs re Remodulin dose. RA pressure is low. d/c lasix. could d/c on lasix 40 tiw and aldactone 25.  -Lasix d'c'd yesterday, started on aldactone 25mg PO daily, plan to d/c on lasix three times a week and aldactone daily.  -BUN/Cr improved TTE with RV enlargement and LV dysfunction possibly 2/2 to RHF. Elevated BNP with troponins 6<-7<-10<-9.   -continue Prednisone  - prn ipratropium  - s/p RHC 7/23: RA 4, RV 80/4 PA 80/40 57 PCWP 22 (24) PA 51, A0 91 Xenia 4.1/2.0   PVR wedge 8.7, PVR LVEDP 13.3  - Recs from Cardiology: Cath shows severe pulmonary HTN, f/u with Pulm recs re Remodulin dose. RA pressure is low. d/c lasix. could d/c on lasix 40 tiw and aldactone 25.  -Lasix d'c'd yesterday, started on aldactone 25mg PO daily, plan to d/c on lasix three times a week and aldactone daily.

## 2020-07-25 NOTE — PROGRESS NOTE ADULT - PROBLEM SELECTOR PLAN 4
Pt has chronic pulmonary embolism, CT-Angio showed distal pulmonary arteries are diminished in size.  Plan:  continue  xarelto overnight 10mg Daily

## 2020-07-25 NOTE — PROGRESS NOTE ADULT - ASSESSMENT
Pt is a 41 y/o female with PMHx pulmonary HTN w/ Class I/IV features (dx in 2014) on continuous Remodulin infusions c/b RV systolic failure s/p PPM, chronic asthma on Atrovent and 2L nasal cannula at night, chronic PE on Xarelto 10mg, GERD, SVT s/p ablation (5/2020), chronic sinusitis, unspecified keratoconus who p/w worsening SOB of 1 month duration. Pt is being admitted to floors for acute-on-chronic RHF and PAH with plan for careful diuresis and continued monitoring of fluid status, currently hemodynamically stable. Fluid status is now at baseline, plan to d/c following optimization of remodulin dose.

## 2020-07-25 NOTE — PROGRESS NOTE ADULT - PROBLEM SELECTOR PLAN 3
Pt has baseline asthma, getting worse over past month, atrovent requirement now up to 5 times a day. O/N used atrovent twice.  -c/w home atrovent and spiriva  -was on RA overnight Pt has baseline asthma, getting worse over past month, atrovent requirement now up to 5 times a day. O/N used atrovent once.  -c/w home atrovent and spiriva  -was on RA overnight

## 2020-07-25 NOTE — PROGRESS NOTE ADULT - ATTENDING COMMENTS
Patient seen and examined with the resident and team at bedside, Labs and vital are reviewed. 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

## 2020-07-25 NOTE — PROGRESS NOTE ADULT - ASSESSMENT
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 Underwent right and left heart cath consistent which revealed worsening pulmonary hypertension and low LVEDP.     Recommendations:  - Discussed with Dr. Yoon and dose of treprostinil increased today from 17 to 18 ng/kg/min   - If able to tolerate, will increase dose gradually every 3 days   - Continue spironolactone 25 mg daily  - Continue prednisone 10 mg daily  - Continue ipratropium and may use levalbuterol as needed  - Continue nocturnal oxygen  - Continue xarelto given history of pulmonary embolism    --------------------------------------------------------  Raheel Cash, PGY-5  Pulmonary/Critical Care Fellow  Pager: [phone] ([facility]), [phone] (NS)

## 2020-07-25 NOTE — PROGRESS NOTE ADULT - ATTENDING COMMENTS
Patient seen and examined, data reviewed.  Remodulin dose increased by patient according to instruction from Dr. Yoon.  Feels fatigued this morning but is otherwise well. VSS, labs WNL.  Agree with current management.

## 2020-07-25 NOTE — PROGRESS NOTE ADULT - PROBLEM SELECTOR PLAN 5
Family hx of anemia, currently at baseline   Plan: Monitor CBC for sudden decrease from baseline, Hg11 on admission.  H&H stable 7/24

## 2020-07-25 NOTE — PROGRESS NOTE ADULT - SUBJECTIVE AND OBJECTIVE BOX
INTERVAL EVENTS          OBJECTIVE    Vital Signs Last 24 Hrs  T(C): 36.4 (25 Jul 2020 05:13), Max: 36.6 (24 Jul 2020 19:15)  T(F): 97.5 (25 Jul 2020 05:13), Max: 97.9 (24 Jul 2020 19:15)  HR: 122 (25 Jul 2020 05:13) (99 - 122)  BP: 100/80 (25 Jul 2020 05:13) (92/61 - 117/71)  BP(mean): 87 (25 Jul 2020 05:13) (87 - 87)  RR: 18 (25 Jul 2020 05:13) (18 - 18)  SpO2: 92% (25 Jul 2020 05:13) (92% - 95%)                  LABORATORY                          11.7   9.81  )-----------( 444      ( 25 Jul 2020 06:27 )             40.4     07-25    136  |  102  |  17  ----------------------------<  94  4.0   |  21<L>  |  1.14    Ca    9.0      25 Jul 2020 06:27  Phos  3.5     07-25  Mg     2.2     07-25    TPro  7.1  /  Alb  4.3  /  TBili  0.8  /  DBili  x   /  AST  14  /  ALT  6<L>  /  AlkPhos  57  07-24      RADIOLOGY    CT Angio Chest w/ IV Cont (07.21.20 @ 13:24) >  The pulmonary trunk, left main, right main and lobar arteries are enlarged, consistent with pulmonary hypertension. No pulmonary embolism in the left main, right main or lobar arteries. The distal pulmonary arteries are diminished in size.    Mosaic attenuation noted within both lungs is most likely secondary to pulmonary hypertension.    Nodule in the medial left breast, correlate with mammography/ultrasound.    < end of copied text >        Transthoracic Echocardiogram (07.21.20 @ 19:25) >  1. Increased relative wall thickness with normal left ventricular mass index, consistent with concentric left ventricular remodeling.  2. Hyperdynamic left ventricular systolic function. Septal motion consistent with right ventricular overload.  3. The right ventricle is not well visualized; grossly Right ventricular enlargement with decreased right ventricular systolic function. TV s' = 7 cm/sec.  4. Estimated right ventricular systolic pressure equals 112 mm Hg, assuming right atrial pressure equals 8 mm Hg, consistent with severe pulmonary hypertension.  *** Compared with echocardiogram of 11/21/2019, Pulmonary  hypertension has increased.  -----------------------------------------    < end of copied text >      Right/left heart cath:  sPAP 80 mmHg / mean PAP 57 mmHg  LVEDP: 4 mmHg  PVR: 8.57 Woods INTERVAL EVENTS  No acute events overnight  Feels tired, otherwise no shortness of breath or chest pain  Edema improved  Dose of treprostinil increased today    OBJECTIVE    Vital Signs Last 24 Hrs  T(C): 36.4 (25 Jul 2020 05:13), Max: 36.6 (24 Jul 2020 19:15)  T(F): 97.5 (25 Jul 2020 05:13), Max: 97.9 (24 Jul 2020 19:15)  HR: 122 (25 Jul 2020 05:13) (99 - 122)  BP: 100/80 (25 Jul 2020 05:13) (92/61 - 117/71)  BP(mean): 87 (25 Jul 2020 05:13) (87 - 87)  RR: 18 (25 Jul 2020 05:13) (18 - 18)  SpO2: 92% (25 Jul 2020 05:13) (92% - 95%)      PHYSICAL EXAM:  General: no acute distress  HEENT: normocephalic  Eyes: extraocular movements intact, pupils equal and reactive to light  Neck: supple  Respiratory: non labored breathing, decreased breath sounds in bases  Cardiovascular: normal rate, regular rhythm  Gastrointestinal: abdomen soft, non tender, non distended  Extremities: lower extremity edema improved  Neurological: alert, oriented, no focal deficits  Psychiatric: cooperative      MEDICATIONS  (STANDING):  pantoprazole    Tablet 40 milliGRAM(s) Oral before breakfast  predniSONE   Tablet 10 milliGRAM(s) Oral daily  Remodulin (Treprostinil) vial 50 milliGRAM(s),IV Solution 20 milliLiter(s) 50 milliGRAM(s) (17 NANOGram(s)/kG/Min) IV Continuous <Continuous>  rivaroxaban 10 milliGRAM(s) Oral with dinner  spironolactone 25 milliGRAM(s) Oral daily  tiotropium 18 MICROgram(s) Capsule 1 Capsule(s) Inhalation daily    MEDICATIONS  (PRN):  ipratropium    for Nebulization 500 MICROGram(s) Nebulizer every 6 hours PRN Shortness of Breath  levalbuterol Inhalation 0.63 milliGRAM(s) Inhalation every 4 hours PRN SOB/wheezing      LABORATORY                          11.7   9.81  )-----------( 444      ( 25 Jul 2020 06:27 )             40.4     07-25    136  |  102  |  17  ----------------------------<  94  4.0   |  21<L>  |  1.14    Ca    9.0      25 Jul 2020 06:27  Phos  3.5     07-25  Mg     2.2     07-25    TPro  7.1  /  Alb  4.3  /  TBili  0.8  /  DBili  x   /  AST  14  /  ALT  6<L>  /  AlkPhos  57  07-24        RADIOLOGY    CT Angio Chest w/ IV Cont (07.21.20 @ 13:24) >  The pulmonary trunk, left main, right main and lobar arteries are enlarged, consistent with pulmonary hypertension. No pulmonary embolism in the left main, right main or lobar arteries. The distal pulmonary arteries are diminished in size.    Mosaic attenuation noted within both lungs is most likely secondary to pulmonary hypertension.    Nodule in the medial left breast, correlate with mammography/ultrasound.    < end of copied text >        Transthoracic Echocardiogram (07.21.20 @ 19:25) >  1. Increased relative wall thickness with normal left ventricular mass index, consistent with concentric left ventricular remodeling.  2. Hyperdynamic left ventricular systolic function. Septal motion consistent with right ventricular overload.  3. The right ventricle is not well visualized; grossly Right ventricular enlargement with decreased right ventricular systolic function. TV s' = 7 cm/sec.  4. Estimated right ventricular systolic pressure equals 112 mm Hg, assuming right atrial pressure equals 8 mm Hg, consistent with severe pulmonary hypertension.  *** Compared with echocardiogram of 11/21/2019, Pulmonary  hypertension has increased.  -----------------------------------------    < end of copied text >      Right/left heart cath:  sPAP 80 mmHg / mean PAP 57 mmHg  LVEDP: 4 mmHg  PVR: 8.57 Woods

## 2020-07-25 NOTE — PROGRESS NOTE ADULT - SUBJECTIVE AND OBJECTIVE BOX
ROXI MARIA  40y  Female    Michelle Cannon MD PGY1 [phone]  *Note incomplete*    Subjective    O/N Events    ROS (incomplete)  REVIEW OF SYSTEMS:  CONSTITUTIONAL: No weakness, fevers or chills  EYES/ENT: No visual changes;  No vertigo or throat pain   NECK: No pain or stiffness  RESPIRATORY: No cough, wheezing, hemoptysis; No shortness of breath  CARDIOVASCULAR: No chest pain or palpitations  GASTROINTESTINAL: No abdominal or epigastric pain. No nausea, vomiting, or hematemesis; No diarrhea or constipation. No melena or hematochezia.  GENITOURINARY: No dysuria, frequency or hematuria  NEUROLOGICAL: No numbness or weakness  SKIN: No itching, rashes      Meds    MEDICATIONS  (STANDING):  pantoprazole    Tablet 40 milliGRAM(s) Oral before breakfast  predniSONE   Tablet 10 milliGRAM(s) Oral daily  Remodulin (Treprostinil) vial 50 milliGRAM(s),IV Solution 20 milliLiter(s) 50 milliGRAM(s) (17 NANOGram(s)/kG/Min) IV Continuous <Continuous>  rivaroxaban 10 milliGRAM(s) Oral with dinner  spironolactone 25 milliGRAM(s) Oral daily  tiotropium 18 MICROgram(s) Capsule 1 Capsule(s) Inhalation daily    MEDICATIONS  (PRN):  ipratropium    for Nebulization 500 MICROGram(s) Nebulizer every 6 hours PRN Shortness of Breath  levalbuterol Inhalation 0.63 milliGRAM(s) Inhalation every 4 hours PRN SOB/wheezing        Vital Signs Last 24 Hrs  T(C): 36.4 (25 Jul 2020 05:13), Max: 36.6 (24 Jul 2020 19:15)  T(F): 97.5 (25 Jul 2020 05:13), Max: 97.9 (24 Jul 2020 19:15)  HR: 122 (25 Jul 2020 05:13) (99 - 122)  BP: 100/80 (25 Jul 2020 05:13) (92/61 - 117/71)  BP(mean): 87 (25 Jul 2020 05:13) (87 - 87)  RR: 18 (25 Jul 2020 05:13) (18 - 18)  SpO2: 92% (25 Jul 2020 05:13) (92% - 95%)    PHYSICAL EXAM: incomplete  GENERAL: NAD, well-groomed, well-developed  HEENT - NC/AT, pupils equal and reactive to light,  ; Moist mucous membranes, Good dentition, No lesions  NECK: Supple, No JVD  CHEST/LUNG: Clear to auscultation bilaterally; No rales, rhonchi, wheezing  HEART: Regular rate and rhythm; No murmurs, rubs, or gallops  ABDOMEN: Soft, Nontender, Nondistended; Bowel sounds present  EXTREMITIES:  2+ Peripheral Pulses, No clubbing, cyanosis, or edema  NEURO:  No Focal deficits, sensory and motor intact  SKIN: No rashes or lesions    Consultant(s) Notes Reviewed:  [x ] YES  [ ] NO  Care Discussed with Consultants/Other Providers [ x] YES  [ ] NO      07-25    136  |  102  |  17  ----------------------------<  94  4.0   |  21<L>  |  1.14  07-24    134<L>  |  100  |  19  ----------------------------<  87  3.6   |  22  |  1.10  07-23    135  |  100  |  25<H>  ----------------------------<  90  3.6   |  20<L>  |  1.24    Ca    9.0      25 Jul 2020 06:27  Ca    8.9      24 Jul 2020 06:56  Ca    8.6      23 Jul 2020 06:29  Phos  3.5     07-25  Mg     2.2     07-25    TPro  7.1  /  Alb  4.3  /  TBili  0.8  /  DBili  x   /  AST  14  /  ALT  6<L>  /  AlkPhos  57  07-24  TPro  7.4  /  Alb  4.3  /  TBili  0.5  /  DBili  x   /  AST  15  /  ALT  7<L>  /  AlkPhos  64  07-23    Magnesium, Serum: 2.2 mg/dL (07-25-20 @ 06:27)  Magnesium, Serum: 2.0 mg/dL (07-24-20 @ 06:56)  Magnesium, Serum: 1.9 mg/dL (07-23-20 @ 06:29)    Phosphorus Level, Serum: 3.5 mg/dL (07-25-20 @ 06:27)  Phosphorus Level, Serum: 3.8 mg/dL (07-24-20 @ 06:56)  Phosphorus Level, Serum: 5.1 mg/dL (07-23-20 @ 06:29)      PT/INR - ( 24 Jul 2020 08:24 )   PT: 13.4 sec;   INR: 1.14 ratio         PTT - ( 24 Jul 2020 08:24 )  PTT:35.4 sec                                        11.7   9.81  )-----------( 444      ( 25 Jul 2020 06:27 )             40.4                         11.5   9.93  )-----------( 415      ( 24 Jul 2020 06:56 )             39.3                         11.9   12.26 )-----------( 452      ( 23 Jul 2020 06:29 )             40.8       CAPILLARY BLOOD GLUCOSE                Microbiology MARIAROXI  40y  Female    Michelle Cannon MD PGY1 [phone]  *Note incomplete*    Subjective;doing well this morning, on room air overnight, used atrovent once overnight, feeling better, reports     O/N Events    Meds    MEDICATIONS  (STANDING):  pantoprazole    Tablet 40 milliGRAM(s) Oral before breakfast  predniSONE   Tablet 10 milliGRAM(s) Oral daily  Remodulin (Treprostinil) vial 50 milliGRAM(s),IV Solution 20 milliLiter(s) 50 milliGRAM(s) (17 NANOGram(s)/kG/Min) IV Continuous <Continuous>  rivaroxaban 10 milliGRAM(s) Oral with dinner  spironolactone 25 milliGRAM(s) Oral daily  tiotropium 18 MICROgram(s) Capsule 1 Capsule(s) Inhalation daily    MEDICATIONS  (PRN):  ipratropium    for Nebulization 500 MICROGram(s) Nebulizer every 6 hours PRN Shortness of Breath  levalbuterol Inhalation 0.63 milliGRAM(s) Inhalation every 4 hours PRN SOB/wheezing        Vital Signs Last 24 Hrs  T(C): 36.4 (25 Jul 2020 05:13), Max: 36.6 (24 Jul 2020 19:15)  T(F): 97.5 (25 Jul 2020 05:13), Max: 97.9 (24 Jul 2020 19:15)  HR: 122 (25 Jul 2020 05:13) (99 - 122)  BP: 100/80 (25 Jul 2020 05:13) (92/61 - 117/71)  BP(mean): 87 (25 Jul 2020 05:13) (87 - 87)  RR: 18 (25 Jul 2020 05:13) (18 - 18)  SpO2: 92% (25 Jul 2020 05:13) (92% - 95%)    PHYSICAL EXAM: incomplete  GENERAL: NAD, well-groomed, well-developed  HEENT - NC/AT, pupils equal and reactive to light,  ; Moist mucous membranes, Good dentition, No lesions  NECK: Supple, No JVD  CHEST/LUNG: Clear to auscultation bilaterally; No rales, rhonchi, wheezing  HEART: Regular rate and rhythm; No murmurs, rubs, or gallops  ABDOMEN: Soft, Nontender, Nondistended; Bowel sounds present  EXTREMITIES:  2+ Peripheral Pulses, No clubbing, cyanosis, or edema  NEURO:  No Focal deficits, sensory and motor intact  SKIN: No rashes or lesions    Consultant(s) Notes Reviewed:  [x ] YES  [ ] NO  Care Discussed with Consultants/Other Providers [ x] YES  [ ] NO      07-25    136  |  102  |  17  ----------------------------<  94  4.0   |  21<L>  |  1.14  07-24    134<L>  |  100  |  19  ----------------------------<  87  3.6   |  22  |  1.10  07-23    135  |  100  |  25<H>  ----------------------------<  90  3.6   |  20<L>  |  1.24    Ca    9.0      25 Jul 2020 06:27  Ca    8.9      24 Jul 2020 06:56  Ca    8.6      23 Jul 2020 06:29  Phos  3.5     07-25  Mg     2.2     07-25    TPro  7.1  /  Alb  4.3  /  TBili  0.8  /  DBili  x   /  AST  14  /  ALT  6<L>  /  AlkPhos  57  07-24  TPro  7.4  /  Alb  4.3  /  TBili  0.5  /  DBili  x   /  AST  15  /  ALT  7<L>  /  AlkPhos  64  07-23    Magnesium, Serum: 2.2 mg/dL (07-25-20 @ 06:27)  Magnesium, Serum: 2.0 mg/dL (07-24-20 @ 06:56)  Magnesium, Serum: 1.9 mg/dL (07-23-20 @ 06:29)    Phosphorus Level, Serum: 3.5 mg/dL (07-25-20 @ 06:27)  Phosphorus Level, Serum: 3.8 mg/dL (07-24-20 @ 06:56)  Phosphorus Level, Serum: 5.1 mg/dL (07-23-20 @ 06:29)      PT/INR - ( 24 Jul 2020 08:24 )   PT: 13.4 sec;   INR: 1.14 ratio         PTT - ( 24 Jul 2020 08:24 )  PTT:35.4 sec                                        11.7   9.81  )-----------( 444      ( 25 Jul 2020 06:27 )             40.4                         11.5   9.93  )-----------( 415      ( 24 Jul 2020 06:56 )             39.3                         11.9   12.26 )-----------( 452      ( 23 Jul 2020 06:29 )             40.8       CAPILLARY BLOOD GLUCOSE                Microbiology MARIAROXI  40y  Female    Michelle Cannon MD PGY1 [phone]    Subjective: doing well this morning, on room air overnight, used atrovent once overnight, feeling better, reports no n/v/d/c    MEDICATIONS  (STANDING):  pantoprazole    Tablet 40 milliGRAM(s) Oral before breakfast  predniSONE   Tablet 10 milliGRAM(s) Oral daily  Remodulin (Treprostinil) vial 50 milliGRAM(s),IV Solution 20 milliLiter(s) 50 milliGRAM(s) (17 NANOGram(s)/kG/Min) IV Continuous <Continuous>  rivaroxaban 10 milliGRAM(s) Oral with dinner  spironolactone 25 milliGRAM(s) Oral daily  tiotropium 18 MICROgram(s) Capsule 1 Capsule(s) Inhalation daily    MEDICATIONS  (PRN):  ipratropium    for Nebulization 500 MICROGram(s) Nebulizer every 6 hours PRN Shortness of Breath  levalbuterol Inhalation 0.63 milliGRAM(s) Inhalation every 4 hours PRN SOB/wheezing        Vital Signs Last 24 Hrs  T(C): 36.4 (25 Jul 2020 05:13), Max: 36.6 (24 Jul 2020 19:15)  T(F): 97.5 (25 Jul 2020 05:13), Max: 97.9 (24 Jul 2020 19:15)  HR: 122 (25 Jul 2020 05:13) (99 - 122)  BP: 100/80 (25 Jul 2020 05:13) (92/61 - 117/71)  BP(mean): 87 (25 Jul 2020 05:13) (87 - 87)  RR: 18 (25 Jul 2020 05:13) (18 - 18)  SpO2: 92% (25 Jul 2020 05:13) (92% - 95%)    PHYSICAL EXAM:  GENERAL: NAD, well-developed  HEAD:  Atraumatic, Normocephalic  EYES: EOMI, conjunctiva and sclera clear  NECK: Supple, No JVD  CHEST/LUNG: occasional crackles at base of lungs; no wheezing  HEART: S1,S2, regular rate and rhythm; no murmurs, rubs, or gallops  ABDOMEN: Soft, Nontender, nondistended; Bowel sounds present  EXTREMITIES:  well-perfused, No clubbing, cyanosis, 1+ b/l LE edema, right thigh catheter site c/d/i  PSYCH: AAOx3  NEUROLOGY: non-focal  SKIN: eft thigh skin tag, round pedunculated, no erythema or induration (1 in. diameter)    Consultant(s) Notes Reviewed:  [x ] YES  [ ] NO  Care Discussed with Consultants/Other Providers [ x] YES  [ ] NO      07-25    136  |  102  |  17  ----------------------------<  94  4.0   |  21<L>  |  1.14  07-24    134<L>  |  100  |  19  ----------------------------<  87  3.6   |  22  |  1.10  07-23    135  |  100  |  25<H>  ----------------------------<  90  3.6   |  20<L>  |  1.24    Ca    9.0      25 Jul 2020 06:27  Ca    8.9      24 Jul 2020 06:56  Ca    8.6      23 Jul 2020 06:29  Phos  3.5     07-25  Mg     2.2     07-25    TPro  7.1  /  Alb  4.3  /  TBili  0.8  /  DBili  x   /  AST  14  /  ALT  6<L>  /  AlkPhos  57  07-24  TPro  7.4  /  Alb  4.3  /  TBili  0.5  /  DBili  x   /  AST  15  /  ALT  7<L>  /  AlkPhos  64  07-23    Magnesium, Serum: 2.2 mg/dL (07-25-20 @ 06:27)  Magnesium, Serum: 2.0 mg/dL (07-24-20 @ 06:56)  Magnesium, Serum: 1.9 mg/dL (07-23-20 @ 06:29)    Phosphorus Level, Serum: 3.5 mg/dL (07-25-20 @ 06:27)  Phosphorus Level, Serum: 3.8 mg/dL (07-24-20 @ 06:56)  Phosphorus Level, Serum: 5.1 mg/dL (07-23-20 @ 06:29)      PT/INR - ( 24 Jul 2020 08:24 )   PT: 13.4 sec;   INR: 1.14 ratio         PTT - ( 24 Jul 2020 08:24 )  PTT:35.4 sec                                        11.7   9.81  )-----------( 444      ( 25 Jul 2020 06:27 )             40.4                         11.5   9.93  )-----------( 415      ( 24 Jul 2020 06:56 )             39.3                         11.9   12.26 )-----------( 452      ( 23 Jul 2020 06:29 )             40.8       CAPILLARY BLOOD GLUCOSE                Microbiology

## 2020-07-25 NOTE — PROGRESS NOTE ADULT - PROBLEM SELECTOR PLAN 2
- As above  - Continue home remodulin 17ng/kg/ml  -f/u with Dr. Yoon RE home remodulin. - As above  - remodulin dose increased by patient with pump, per Dr. Yoon and Rosie aguero  - dose increased from 17 to 18 ng/kg/min with plan to increase dose gradually every 3 days as tolerated by patient

## 2020-07-25 NOTE — PROGRESS NOTE ADULT - PROBLEM SELECTOR PLAN 8
DVT ppx- holding Xarelto in the setting of cardiac cath - added yesterday night post cardiac cath  DASH diet  telemetry  protonix 40mg daily

## 2020-07-25 NOTE — PROGRESS NOTE ADULT - PROBLEM SELECTOR PLAN 6
Pt was experiencing sinus pain at the time of admission, resolved  recommend follow-up outpatient to prevent further episodes  leukocytosis improved

## 2020-07-26 LAB
ANION GAP SERPL CALC-SCNC: 11 MMOL/L — SIGNIFICANT CHANGE UP (ref 5–17)
BUN SERPL-MCNC: 16 MG/DL — SIGNIFICANT CHANGE UP (ref 7–23)
CALCIUM SERPL-MCNC: 8.9 MG/DL — SIGNIFICANT CHANGE UP (ref 8.4–10.5)
CHLORIDE SERPL-SCNC: 102 MMOL/L — SIGNIFICANT CHANGE UP (ref 96–108)
CO2 SERPL-SCNC: 22 MMOL/L — SIGNIFICANT CHANGE UP (ref 22–31)
CREAT SERPL-MCNC: 1.09 MG/DL — SIGNIFICANT CHANGE UP (ref 0.5–1.3)
GLUCOSE SERPL-MCNC: 94 MG/DL — SIGNIFICANT CHANGE UP (ref 70–99)
HCT VFR BLD CALC: 39.7 % — SIGNIFICANT CHANGE UP (ref 34.5–45)
HGB BLD-MCNC: 11.6 G/DL — SIGNIFICANT CHANGE UP (ref 11.5–15.5)
MAGNESIUM SERPL-MCNC: 2.2 MG/DL — SIGNIFICANT CHANGE UP (ref 1.6–2.6)
MCHC RBC-ENTMCNC: 18.3 PG — LOW (ref 27–34)
MCHC RBC-ENTMCNC: 29.2 GM/DL — LOW (ref 32–36)
MCV RBC AUTO: 62.5 FL — LOW (ref 80–100)
NRBC # BLD: 0 /100 WBCS — SIGNIFICANT CHANGE UP (ref 0–0)
PHOSPHATE SERPL-MCNC: 3.7 MG/DL — SIGNIFICANT CHANGE UP (ref 2.5–4.5)
PLATELET # BLD AUTO: 416 K/UL — HIGH (ref 150–400)
POTASSIUM SERPL-MCNC: 3.9 MMOL/L — SIGNIFICANT CHANGE UP (ref 3.5–5.3)
POTASSIUM SERPL-SCNC: 3.9 MMOL/L — SIGNIFICANT CHANGE UP (ref 3.5–5.3)
RBC # BLD: 6.35 M/UL — HIGH (ref 3.8–5.2)
RBC # FLD: 19.8 % — HIGH (ref 10.3–14.5)
SODIUM SERPL-SCNC: 135 MMOL/L — SIGNIFICANT CHANGE UP (ref 135–145)
WBC # BLD: 11.06 K/UL — HIGH (ref 3.8–10.5)
WBC # FLD AUTO: 11.06 K/UL — HIGH (ref 3.8–10.5)

## 2020-07-26 PROCEDURE — 99232 SBSQ HOSP IP/OBS MODERATE 35: CPT | Mod: GC

## 2020-07-26 RX ADMIN — Medication 500 MICROGRAM(S): at 14:48

## 2020-07-26 RX ADMIN — PANTOPRAZOLE SODIUM 40 MILLIGRAM(S): 20 TABLET, DELAYED RELEASE ORAL at 05:59

## 2020-07-26 RX ADMIN — RIVAROXABAN 10 MILLIGRAM(S): KIT at 17:26

## 2020-07-26 RX ADMIN — Medication 10 MILLIGRAM(S): at 05:58

## 2020-07-26 RX ADMIN — Medication 500 MICROGRAM(S): at 06:28

## 2020-07-26 RX ADMIN — Medication 500 MICROGRAM(S): at 22:18

## 2020-07-26 NOTE — PROGRESS NOTE ADULT - PROBLEM SELECTOR PLAN 6
Pt was experiencing sinus pain at the time of admission, resolved  recommend follow-up outpatient to prevent further episodes  leukocytosis improved Pt was experiencing sinus pain at the time of admission, resolved  - recommend follow-up outpatient to prevent further episodes  - leukocytosis improved

## 2020-07-26 NOTE — PROGRESS NOTE ADULT - ATTENDING COMMENTS
Patient seen and examined with the resident and team at bedside, Labs and vital are reviewed. 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

## 2020-07-26 NOTE — PROGRESS NOTE ADULT - SUBJECTIVE AND OBJECTIVE BOX
RXOI MARIA  40y  Female    Michelle Cannon MD PGY1 [phone]    Subjective:     O/N Events:    ROS (incomplete)  REVIEW OF SYSTEMS:  CONSTITUTIONAL: No weakness, fevers or chills  EYES/ENT: No visual changes;  No vertigo or throat pain   NECK: No pain or stiffness  RESPIRATORY: No cough, wheezing, hemoptysis; No shortness of breath  CARDIOVASCULAR: No chest pain or palpitations  GASTROINTESTINAL: No abdominal or epigastric pain. No nausea, vomiting, or hematemesis; No diarrhea or constipation. No melena or hematochezia.  GENITOURINARY: No dysuria, frequency or hematuria  NEUROLOGICAL: No numbness or weakness  SKIN: No itching, rashes      Meds    MEDICATIONS  (STANDING):  pantoprazole    Tablet 40 milliGRAM(s) Oral before breakfast  predniSONE   Tablet 10 milliGRAM(s) Oral daily  Remodulin (Treprostinil) vial 50 milliGRAM(s),IV Solution 20 milliLiter(s) 50 milliGRAM(s) (17 NANOGram(s)/kG/Min) IV Continuous <Continuous>  rivaroxaban 10 milliGRAM(s) Oral with dinner  spironolactone 25 milliGRAM(s) Oral daily  tiotropium 18 MICROgram(s) Capsule 1 Capsule(s) Inhalation daily    MEDICATIONS  (PRN):  ipratropium    for Nebulization 500 MICROGram(s) Nebulizer every 6 hours PRN Shortness of Breath  levalbuterol Inhalation 0.63 milliGRAM(s) Inhalation every 4 hours PRN SOB/wheezing        Vital Signs Last 24 Hrs  T(C): 36.4 (25 Jul 2020 20:21), Max: 36.7 (25 Jul 2020 12:32)  T(F): 97.5 (25 Jul 2020 20:21), Max: 98 (25 Jul 2020 12:32)  HR: 107 (25 Jul 2020 20:21) (101 - 107)  BP: 103/69 (25 Jul 2020 20:21) (103/69 - 103/79)  BP(mean): --  RR: 18 (25 Jul 2020 20:21) (18 - 18)  SpO2: 97% (25 Jul 2020 20:21) (95% - 97%)    PHYSICAL EXAM: incomplete  GENERAL: NAD, well-groomed, well-developed  HEENT - NC/AT, pupils equal and reactive to light,  ; Moist mucous membranes, Good dentition, No lesions  NECK: Supple, No JVD  CHEST/LUNG: Clear to auscultation bilaterally; No rales, rhonchi, wheezing  HEART: Regular rate and rhythm; No murmurs, rubs, or gallops  ABDOMEN: Soft, Nontender, Nondistended; Bowel sounds present  EXTREMITIES:  2+ Peripheral Pulses, No clubbing, cyanosis, or edema  NEURO:  No Focal deficits, sensory and motor intact  SKIN: No rashes or lesions    Consultant(s) Notes Reviewed:  [x ] YES  [ ] NO  Care Discussed with Consultants/Other Providers [ x] YES  [ ] NO      07-26    135  |  102  |  16  ----------------------------<  94  3.9   |  22  |  1.09  07-25    136  |  102  |  17  ----------------------------<  94  4.0   |  21<L>  |  1.14  07-24    134<L>  |  100  |  19  ----------------------------<  87  3.6   |  22  |  1.10    Ca    8.9      26 Jul 2020 06:32  Ca    9.0      25 Jul 2020 06:27  Ca    8.9      24 Jul 2020 06:56  Phos  3.7     07-26  Mg     2.2     07-26    TPro  7.1  /  Alb  4.3  /  TBili  0.8  /  DBili  x   /  AST  14  /  ALT  6<L>  /  AlkPhos  57  07-24    Magnesium, Serum: 2.2 mg/dL (07-26-20 @ 06:32)  Magnesium, Serum: 2.2 mg/dL (07-25-20 @ 06:27)  Magnesium, Serum: 2.0 mg/dL (07-24-20 @ 06:56)    Phosphorus Level, Serum: 3.7 mg/dL (07-26-20 @ 06:32)  Phosphorus Level, Serum: 3.5 mg/dL (07-25-20 @ 06:27)  Phosphorus Level, Serum: 3.8 mg/dL (07-24-20 @ 06:56)      PT/INR - ( 24 Jul 2020 08:24 )   PT: 13.4 sec;   INR: 1.14 ratio         PTT - ( 24 Jul 2020 08:24 )  PTT:35.4 sec                                        11.6   11.06 )-----------( 416      ( 26 Jul 2020 06:32 )             39.7                         11.7   9.81  )-----------( 444      ( 25 Jul 2020 06:27 )             40.4                         11.5   9.93  )-----------( 415      ( 24 Jul 2020 06:56 )             39.3       CAPILLARY BLOOD GLUCOSE                Microbiology CROW ROXI  40y  Female    Michelle Cannon MD PGY1 [phone]    O/N Events: No acute events overnight  Subjective: Pt states she had some mild nausea yesterday after the remodulin dose was increased, but that it resolved quickly. She denies f/c/n/v/d/c/vision changes. Reports no cough, SOB or wheezing today, satisfied with her current treatments.     MEDICATIONS  (STANDING):  pantoprazole    Tablet 40 milliGRAM(s) Oral before breakfast  predniSONE   Tablet 10 milliGRAM(s) Oral daily  Remodulin (Treprostinil) vial 50 milliGRAM(s),IV Solution 20 milliLiter(s) 50 milliGRAM(s) (17 NANOGram(s)/kG/Min) IV Continuous <Continuous>  rivaroxaban 10 milliGRAM(s) Oral with dinner  spironolactone 25 milliGRAM(s) Oral daily  tiotropium 18 MICROgram(s) Capsule 1 Capsule(s) Inhalation daily    MEDICATIONS  (PRN):  ipratropium    for Nebulization 500 MICROGram(s) Nebulizer every 6 hours PRN Shortness of Breath  levalbuterol Inhalation 0.63 milliGRAM(s) Inhalation every 4 hours PRN SOB/wheezing        Vital Signs Last 24 Hrs  T(C): 36.4 (25 Jul 2020 20:21), Max: 36.7 (25 Jul 2020 12:32)  T(F): 97.5 (25 Jul 2020 20:21), Max: 98 (25 Jul 2020 12:32)  HR: 107 (25 Jul 2020 20:21) (101 - 107)  BP: 103/69 (25 Jul 2020 20:21) (103/69 - 103/79)  BP(mean): --  RR: 18 (25 Jul 2020 20:21) (18 - 18)  SpO2: 97% (25 Jul 2020 20:21) (95% - 97%)    PHYSICAL EXAM:  GENERAL: NAD, well-developed  HEAD:  Atraumatic, Normocephalic  EYES: EOMI, conjunctiva and sclera clear  NECK: Supple, No JVD  CHEST/LUNG: occasional crackles at base of lungs; no wheezing  HEART: S1,S2, regular rate and rhythm; no murmurs, rubs, or gallops  ABDOMEN: Soft, Nontender, nondistended; Bowel sounds present  EXTREMITIES:  well-perfused, No clubbing, cyanosis, no edema   PSYCH: AAOx4  NEUROLOGY: non-focal  SKIN: eft thigh skin tag, round pedunculated, no erythema or induration (1 in. diameter)    Consultant(s) Notes Reviewed:  [x ] YES  [ ] NO  Care Discussed with Consultants/Other Providers [ x] YES  [ ] NO      07-26    135  |  102  |  16  ----------------------------<  94  3.9   |  22  |  1.09  07-25    136  |  102  |  17  ----------------------------<  94  4.0   |  21<L>  |  1.14  07-24    134<L>  |  100  |  19  ----------------------------<  87  3.6   |  22  |  1.10    Ca    8.9      26 Jul 2020 06:32  Ca    9.0      25 Jul 2020 06:27  Ca    8.9      24 Jul 2020 06:56  Phos  3.7     07-26  Mg     2.2     07-26    TPro  7.1  /  Alb  4.3  /  TBili  0.8  /  DBili  x   /  AST  14  /  ALT  6<L>  /  AlkPhos  57  07-24    Magnesium, Serum: 2.2 mg/dL (07-26-20 @ 06:32)  Magnesium, Serum: 2.2 mg/dL (07-25-20 @ 06:27)  Magnesium, Serum: 2.0 mg/dL (07-24-20 @ 06:56)    Phosphorus Level, Serum: 3.7 mg/dL (07-26-20 @ 06:32)  Phosphorus Level, Serum: 3.5 mg/dL (07-25-20 @ 06:27)  Phosphorus Level, Serum: 3.8 mg/dL (07-24-20 @ 06:56)      PT/INR - ( 24 Jul 2020 08:24 )   PT: 13.4 sec;   INR: 1.14 ratio         PTT - ( 24 Jul 2020 08:24 )  PTT:35.4 sec                                        11.6   11.06 )-----------( 416      ( 26 Jul 2020 06:32 )             39.7                         11.7   9.81  )-----------( 444      ( 25 Jul 2020 06:27 )             40.4                         11.5   9.93  )-----------( 415      ( 24 Jul 2020 06:56 )             39.3       CAPILLARY BLOOD GLUCOSE                Microbiology

## 2020-07-26 NOTE — PROGRESS NOTE ADULT - PROBLEM SELECTOR PLAN 1
TTE with RV enlargement and LV dysfunction possibly 2/2 to RHF. Elevated BNP with troponins 6<-7<-10<-9.   -continue Prednisone  - prn ipratropium  - s/p RHC 7/23: RA 4, RV 80/4 PA 80/40 57 PCWP 22 (24) PA 51, A0 91 Xenia 4.1/2.0   PVR wedge 8.7, PVR LVEDP 13.3  - Recs from Cardiology: Cath shows severe pulmonary HTN, f/u with Pulm recs re Remodulin dose. RA pressure is low. d/c lasix. could d/c on lasix 40 tiw and aldactone 25.  -Lasix d'c'd yesterday, started on aldactone 25mg PO daily, plan to d/c on lasix three times a week and aldactone daily.

## 2020-07-26 NOTE — PROGRESS NOTE ADULT - PROBLEM SELECTOR PLAN 8
DVT ppx- holding Xarelto in the setting of cardiac cath - added yesterday night post cardiac cath  DASH diet  telemetry  protonix 40mg daily DVT ppx - c/w home xarelto  - DASH diet  - telemetry  protonix 40mg daily

## 2020-07-26 NOTE — PROGRESS NOTE ADULT - PROBLEM SELECTOR PLAN 7
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 Pt described no changes in vision, vision at baseline per patient, plan for patient to follow up outpatient with ophthalmologist. 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 Pt described no changes in vision, vision at baseline per patient,   - follow up outpatient with ophthalmologist.

## 2020-07-26 NOTE — PROGRESS NOTE ADULT - PROBLEM SELECTOR PLAN 5
Family hx of anemia, currently at baseline   Plan: Monitor CBC for sudden decrease from baseline, Hg11 on admission.  H&H stable 7/24 Family hx of anemia, currently at baseline   - Monitor CBC for sudden decrease from baseline, Hg11 on admission.  - H&H stable

## 2020-07-26 NOTE — PROGRESS NOTE ADULT - PROBLEM SELECTOR PLAN 9
Nodule in left breast - plan to inform patient and have her follow up outpatient. Nodule in left breast - informed patient and she agreed with the plan to follow up outpatient.

## 2020-07-26 NOTE — PROGRESS NOTE ADULT - PROBLEM SELECTOR PLAN 4
Pt has chronic pulmonary embolism, CT-Angio showed distal pulmonary arteries are diminished in size.  Plan:  continue  xarelto overnight 10mg Daily Pt has chronic pulmonary embolism, CT-Angio showed distal pulmonary arteries are diminished in size.  - continue xarelto overnight 10mg Daily

## 2020-07-26 NOTE — PROGRESS NOTE ADULT - PROBLEM SELECTOR PLAN 2
- As above  - remodulin dose increased by patient with pump, per Dr. Yoon and Rosie aguero  - dose increased from 17 to 18 ng/kg/min with plan to increase dose gradually every 3 days as tolerated by patient

## 2020-07-26 NOTE — PROGRESS NOTE ADULT - PROBLEM SELECTOR PLAN 3
Pt has baseline asthma, getting worse over past month, atrovent requirement now up to 5 times a day. O/N used atrovent once.  -c/w home atrovent and spiriva  -was on RA overnight Pt has baseline asthma, getting worse over past month, atrovent requirement now up to 5 times a day. O/N used atrovent once.  - c/w home atrovent and spiriva  - was on RA overnight

## 2020-07-27 LAB
ANION GAP SERPL CALC-SCNC: 14 MMOL/L — SIGNIFICANT CHANGE UP (ref 5–17)
BUN SERPL-MCNC: 16 MG/DL — SIGNIFICANT CHANGE UP (ref 7–23)
CALCIUM SERPL-MCNC: 8.6 MG/DL — SIGNIFICANT CHANGE UP (ref 8.4–10.5)
CHLORIDE SERPL-SCNC: 101 MMOL/L — SIGNIFICANT CHANGE UP (ref 96–108)
CO2 SERPL-SCNC: 21 MMOL/L — LOW (ref 22–31)
CREAT SERPL-MCNC: 1.05 MG/DL — SIGNIFICANT CHANGE UP (ref 0.5–1.3)
GLUCOSE SERPL-MCNC: 87 MG/DL — SIGNIFICANT CHANGE UP (ref 70–99)
HCT VFR BLD CALC: 37.7 % — SIGNIFICANT CHANGE UP (ref 34.5–45)
HGB BLD-MCNC: 10.9 G/DL — LOW (ref 11.5–15.5)
MAGNESIUM SERPL-MCNC: 2.2 MG/DL — SIGNIFICANT CHANGE UP (ref 1.6–2.6)
MCHC RBC-ENTMCNC: 18.3 PG — LOW (ref 27–34)
MCHC RBC-ENTMCNC: 28.9 GM/DL — LOW (ref 32–36)
MCV RBC AUTO: 63.1 FL — LOW (ref 80–100)
NRBC # BLD: 0 /100 WBCS — SIGNIFICANT CHANGE UP (ref 0–0)
PHOSPHATE SERPL-MCNC: 3.9 MG/DL — SIGNIFICANT CHANGE UP (ref 2.5–4.5)
PLATELET # BLD AUTO: 375 K/UL — SIGNIFICANT CHANGE UP (ref 150–400)
POTASSIUM SERPL-MCNC: 3.7 MMOL/L — SIGNIFICANT CHANGE UP (ref 3.5–5.3)
POTASSIUM SERPL-SCNC: 3.7 MMOL/L — SIGNIFICANT CHANGE UP (ref 3.5–5.3)
RBC # BLD: 5.97 M/UL — HIGH (ref 3.8–5.2)
RBC # FLD: 19 % — HIGH (ref 10.3–14.5)
SODIUM SERPL-SCNC: 136 MMOL/L — SIGNIFICANT CHANGE UP (ref 135–145)
WBC # BLD: 10.28 K/UL — SIGNIFICANT CHANGE UP (ref 3.8–10.5)
WBC # FLD AUTO: 10.28 K/UL — SIGNIFICANT CHANGE UP (ref 3.8–10.5)

## 2020-07-27 PROCEDURE — 99233 SBSQ HOSP IP/OBS HIGH 50: CPT | Mod: GC

## 2020-07-27 PROCEDURE — 99232 SBSQ HOSP IP/OBS MODERATE 35: CPT

## 2020-07-27 RX ORDER — FUROSEMIDE 40 MG
20 TABLET ORAL DAILY
Refills: 0 | Status: DISCONTINUED | OUTPATIENT
Start: 2020-07-27 | End: 2020-07-28

## 2020-07-27 RX ORDER — ACETAMINOPHEN 500 MG
650 TABLET ORAL EVERY 6 HOURS
Refills: 0 | Status: DISCONTINUED | OUTPATIENT
Start: 2020-07-27 | End: 2020-07-29

## 2020-07-27 RX ORDER — FUROSEMIDE 40 MG
40 TABLET ORAL ONCE
Refills: 0 | Status: DISCONTINUED | OUTPATIENT
Start: 2020-07-27 | End: 2020-07-27

## 2020-07-27 RX ADMIN — Medication 650 MILLIGRAM(S): at 15:15

## 2020-07-27 RX ADMIN — SPIRONOLACTONE 25 MILLIGRAM(S): 25 TABLET, FILM COATED ORAL at 05:10

## 2020-07-27 RX ADMIN — Medication 500 MICROGRAM(S): at 04:04

## 2020-07-27 RX ADMIN — Medication 500 MICROGRAM(S): at 12:21

## 2020-07-27 RX ADMIN — RIVAROXABAN 10 MILLIGRAM(S): KIT at 18:26

## 2020-07-27 RX ADMIN — PANTOPRAZOLE SODIUM 40 MILLIGRAM(S): 20 TABLET, DELAYED RELEASE ORAL at 05:10

## 2020-07-27 RX ADMIN — Medication 650 MILLIGRAM(S): at 14:43

## 2020-07-27 RX ADMIN — Medication 10 MILLIGRAM(S): at 05:10

## 2020-07-27 NOTE — PROGRESS NOTE ADULT - PROBLEM SELECTOR PLAN 4
Pt has chronic pulmonary embolism, CT-Angio showed distal pulmonary arteries are diminished in size.  - continue xarelto overnight 10mg Daily

## 2020-07-27 NOTE — DIETITIAN INITIAL EVALUATION ADULT. - ENERGY NEEDS
ht: 67 inches. estimated dry wt per pt: 190 pounds. current weight: 198.4 pounds. dry wt BMI: 29.7 kG/m2. IBW: 135 pounds +/- 10%. %IBW: 147%.  Other pertinent objective information: Pt is a 40 year old female with PMH pulmonary HTN w/ Class I/IV features (dx in 2014) on continuous Remodulin infusions c/b RV systolic failure s/p PPM, chronic asthma on Atrovent and 2L nasal cannula at night, chronic PE on Xarelto 10mg, GERD, SVT s/p ablation (5/2020), chronic sinusitis, unspecified keratoconus who p/w worsening SOB of 1 month duration. Pt is being admitted to floors for acute-on-chronic RHF. Skin: No pressure injuries noted.

## 2020-07-27 NOTE — PROGRESS NOTE ADULT - PROBLEM SELECTOR PLAN 7
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 Pt described no changes in vision, vision at baseline per patient,   - follow up outpatient with ophthalmologist.

## 2020-07-27 NOTE — PROGRESS NOTE ADULT - PROBLEM SELECTOR PLAN 3
Pt has baseline asthma, getting worse over past month, atrovent requirement now up to 5 times a day. O/N used atrovent once.  - c/w home atrovent and spiriva  - was on RA overnight Pt has baseline asthma, getting worse over past month, atrovent requirement now up to 5 times a day. O/N used atrovent once.  - c/w home atrovent and spiriva, levalbuterol as needed

## 2020-07-27 NOTE — PROGRESS NOTE ADULT - ASSESSMENT
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 Underwent right and left heart cath consistent which revealed worsening pulmonary hypertension and low LVEDP (mPAP57, PCWP 22, LVEDP 4).     Recommendations:  - Discussed with Dr. Yoon - increase dose of treprostinil today from 17 to 18 ng/kg/min   - If able to tolerate, will increase dose gradually every 3 days   - Continue spironolactone 25 mg daily  - Continue prednisone 10 mg daily  - Continue ipratropium and may use levalbuterol as needed  - Continue nocturnal oxygen  - Continue xarelto given history of pulmonary embolism    Agata Birch MD  Pulmonary & Critical Care Medicine Fellow | PGY-5  Pager: [phone]  Spectra: [phone] ([facility]) and [phone] ([facility]) 59 yo woman with pulmonary hypertension likely WHO class I and possible component of class IV given history of pulmonary embolism (previously on riociguat, currently on remodulin), on anticoagulation with half dose xarelto due to anemia, 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 Underwent right and left heart cath consistent which revealed worsening pulmonary hypertension and low LVEDP (mPAP57, mPCWP 22, LVEDP 4).     Recommendations:  - Discussed with Dr. Yoon - increase dose of treprostinil yesterday from 17 to 18 ng/kg/min   - Can continue with dose titrations and current medications (lasix, aldactone, prednisone, xarelto, atrovent/spiriva) at home with televisit with Dr. Yoon on Friday  - If able to tolerate, will increase dose gradually every 3 days to goal of 25  - Continue spironolactone 25 mg daily  - Continue prednisone 10 mg daily  - Continue ipratropium and may use levalbuterol as needed  - Continue nocturnal oxygen  - Continue xarelto given history of pulmonary embolism  - Continue Lasix 20mg PO daily    Kye Retana  PGY1 - Internal Medicine  Pager: [phone]    Agata Birch MD  Pulmonary & Critical Care Medicine Fellow | PGY-5  Pager: [phone]  Spectra: [phone] ([facility]) and [phone] ([facility])    **NOTE INCOMPLETE UNTIL SIGNED BY ATTENDING**

## 2020-07-27 NOTE — DIETITIAN INITIAL EVALUATION ADULT. - OTHER INFO
Pt currently reports good po intake/appetite eating most-all of meals. Denies GI distress at this time, no N/V/diarrhea or vomiting, does admit to chronic intermittent nausea which she attributes to meds. Pt denies chewing/swallowing difficulty. Admits to recent weight gain (8 pounds per current daily wt) 2/2 fluid retention, UBW stated as 190 pounds. Pt reports adhering to a low sodium diet at home, states her and her  are diligent about restricting sodium when they cook (does not add salt to food, no longer used high sodium canned products). She admits to an unusual sleeping schedule therefore often skips breakfast and lunch and will have one larger dinner meal consisting of spaghetti or burgers and fries. She drinks mostly water, tea, juice and soda. Large 2L bottle observed at bedside. Pt denies use of vitamin/mineral/herbal supplements. Denies food allergies or intolerance.

## 2020-07-27 NOTE — PROGRESS NOTE ADULT - PROBLEM SELECTOR PLAN 6
Pt was experiencing sinus pain at the time of admission, resolved  - recommend follow-up outpatient to prevent further episodes  - leukocytosis improved

## 2020-07-27 NOTE — PROGRESS NOTE ADULT - SUBJECTIVE AND OBJECTIVE BOX
Pulmonary Progress Note     Interval Events:  No acute events overnight    SUBJECTIVE:    OBJECTIVE:  ICU Vital Signs Last 24 Hrs  T(C): 36.6 (27 Jul 2020 04:35), Max: 37 (26 Jul 2020 06:17)  T(F): 97.8 (27 Jul 2020 04:35), Max: 98.6 (26 Jul 2020 06:17)  HR: 91 (27 Jul 2020 04:35) (83 - 97)  BP: 101/69 (27 Jul 2020 04:35) (98/65 - 148/82)  RR: 18 (27 Jul 2020 04:35) (18 - 20)  SpO2: 95% (27 Jul 2020 04:35) (95% - 99%)    07-25 @ 07:01  -  07-26 @ 07:00  --------------------------------------------------------  IN: 580 mL / OUT: 850 mL / NET: -270 mL    07-26 @ 07:01 - 07-27 @ 05:35  --------------------------------------------------------  IN: 240 mL / OUT: 0 mL / NET: 240 mL    PHYSICAL EXAM:  GENERAL: NAD, well-groomed, well-developed  HEAD:  Atraumatic, Normocephalic  EYES: EOMI, conjunctiva and sclera clear  NERVOUS SYSTEM:  Alert & Oriented X3, Good concentration; Moves all extremities  CHEST/LUNG: Clear to percussion bilaterally; No rales, rhonchi, wheezing, or rubs  HEART: Regular rate and rhythm; No murmurs, rubs, or gallops  ABDOMEN: Soft, Nontender, Nondistended;   EXTREMITIES:  2+ Peripheral Pulses, No LE edema  SKIN: No rashes or lesions    HOSPITAL MEDICATIONS:  MEDICATIONS  (STANDING):  pantoprazole    Tablet 40 milliGRAM(s) Oral before breakfast  predniSONE   Tablet 10 milliGRAM(s) Oral daily  Remodulin (Treprostinil) vial 50 milliGRAM(s),IV Solution 20 milliLiter(s) 50 milliGRAM(s) (17 NANOGram(s)/kG/Min) IV Continuous <Continuous>  rivaroxaban 10 milliGRAM(s) Oral with dinner  spironolactone 25 milliGRAM(s) Oral daily  tiotropium 18 MICROgram(s) Capsule 1 Capsule(s) Inhalation daily    MEDICATIONS  (PRN):  ipratropium    for Nebulization 500 MICROGram(s) Nebulizer every 6 hours PRN Shortness of Breath  levalbuterol Inhalation 0.63 milliGRAM(s) Inhalation every 4 hours PRN SOB/wheezing    LABS:                        11.6   11.06 )-----------( 416      ( 26 Jul 2020 06:32 )             39.7     Hgb Trend: 11.6<--, 11.7<--, 11.5<--, 11.9<--, 12.1<--  07-26    135  |  102  |  16  ----------------------------<  94  3.9   |  22  |  1.09    Ca    8.9      26 Jul 2020 06:32  Phos  3.7     07-26  Mg     2.2     07-26      Creatinine Trend: 1.09<--, 1.14<--, 1.10<--, 1.24<--, 1.22<--, 1.07<--    RADIOLOGY:  [x] Reviewed and interpreted by me: No new imaging Pulmonary Progress Note     Interval Events: romodulin dose increased from 17 to 18  No acute events overnight    SUBJECTIVE: endorsing breathing improved and good urine output but still having some jaw pain that is attributed to increasing dose of romodulin. Nausea present intermittently as well also attributed to romodulin. Otherwise denies headache, fever, chills, SOB, cough, chest pain, palpitations, abdominal pain, diarrhea, vomiting, constipation, and dysuria.      OBJECTIVE:  ICU Vital Signs Last 24 Hrs  T(C): 36.6 (27 Jul 2020 04:35), Max: 37 (26 Jul 2020 06:17)  T(F): 97.8 (27 Jul 2020 04:35), Max: 98.6 (26 Jul 2020 06:17)  HR: 91 (27 Jul 2020 04:35) (83 - 97)  BP: 101/69 (27 Jul 2020 04:35) (98/65 - 148/82)  RR: 18 (27 Jul 2020 04:35) (18 - 20)  SpO2: 95% (27 Jul 2020 04:35) (95% - 99%)    07-25 @ 07:01 - 07-26 @ 07:00  --------------------------------------------------------  IN: 580 mL / OUT: 850 mL / NET: -270 mL    07-26 @ 07:01 - 07-27 @ 05:35  --------------------------------------------------------  IN: 240 mL / OUT: 0 mL / NET: 240 mL    PHYSICAL EXAM:  GENERAL: NAD, well-groomed, well-developed  HEAD:  Atraumatic, Normocephalic  EYES: EOMI, conjunctiva and sclera clear  NERVOUS SYSTEM:  Alert & Oriented X3, Good concentration; Moves all extremities  CHEST/LUNG: Clear to percussion bilaterally; No rales, rhonchi, wheezing, or rubs  HEART: Regular rate and rhythm; No murmurs, rubs, or gallops  ABDOMEN: Soft, Nontender, Nondistended;   EXTREMITIES:  2+ Peripheral Pulses, No LE edema  SKIN: No rashes or lesions    HOSPITAL MEDICATIONS:  MEDICATIONS  (STANDING):  pantoprazole    Tablet 40 milliGRAM(s) Oral before breakfast  predniSONE   Tablet 10 milliGRAM(s) Oral daily  Remodulin (Treprostinil) vial 50 milliGRAM(s),IV Solution 20 milliLiter(s) 50 milliGRAM(s) (17 NANOGram(s)/kG/Min) IV Continuous <Continuous>  rivaroxaban 10 milliGRAM(s) Oral with dinner  spironolactone 25 milliGRAM(s) Oral daily  tiotropium 18 MICROgram(s) Capsule 1 Capsule(s) Inhalation daily    MEDICATIONS  (PRN):  ipratropium    for Nebulization 500 MICROGram(s) Nebulizer every 6 hours PRN Shortness of Breath  levalbuterol Inhalation 0.63 milliGRAM(s) Inhalation every 4 hours PRN SOB/wheezing    LABS:                        11.6   11.06 )-----------( 416      ( 26 Jul 2020 06:32 )             39.7     Hgb Trend: 11.6<--, 11.7<--, 11.5<--, 11.9<--, 12.1<--  07-26    135  |  102  |  16  ----------------------------<  94  3.9   |  22  |  1.09    Ca    8.9      26 Jul 2020 06:32  Phos  3.7     07-26  Mg     2.2     07-26      Creatinine Trend: 1.09<--, 1.14<--, 1.10<--, 1.24<--, 1.22<--, 1.07<--    RADIOLOGY:  [x] Reviewed and interpreted by me: No new imaging

## 2020-07-27 NOTE — PROGRESS NOTE ADULT - SUBJECTIVE AND OBJECTIVE BOX
ROXI MARIA  40y  Female    Michelle Cannon MD PGY1 [phone]    Subjective: does not want lasix, says it doesn't maker her pee, used atrovent once before bed and once in the morning, breathing well, symptoms have resolved. Reports jaw pain when remodulin dose is increased, nausea comes and goes. no chest pain, sob, reports no visual changes, weakness, vomiting diarrhea or constipation.    MEDICATIONS  (STANDING):  furosemide    Tablet 40 milliGRAM(s) Oral once  pantoprazole    Tablet 40 milliGRAM(s) Oral before breakfast  predniSONE   Tablet 10 milliGRAM(s) Oral daily  Remodulin (Treprostinil) vial 50 milliGRAM(s),IV Solution 20 milliLiter(s) 50 milliGRAM(s) (17 NANOGram(s)/kG/Min) IV Continuous <Continuous>  rivaroxaban 10 milliGRAM(s) Oral with dinner  spironolactone 25 milliGRAM(s) Oral daily  tiotropium 18 MICROgram(s) Capsule 1 Capsule(s) Inhalation daily    MEDICATIONS  (PRN):  ipratropium    for Nebulization 500 MICROGram(s) Nebulizer every 6 hours PRN Shortness of Breath  levalbuterol Inhalation 0.63 milliGRAM(s) Inhalation every 4 hours PRN SOB/wheezing        Vital Signs Last 24 Hrs  T(C): 36.6 (27 Jul 2020 04:35), Max: 36.8 (26 Jul 2020 20:05)  T(F): 97.8 (27 Jul 2020 04:35), Max: 98.2 (26 Jul 2020 20:05)  HR: 91 (27 Jul 2020 04:35) (83 - 97)  BP: 101/69 (27 Jul 2020 04:35) (101/69 - 148/82)  BP(mean): --  RR: 18 (27 Jul 2020 04:35) (18 - 18)  SpO2: 95% (27 Jul 2020 04:35) (95% - 99%)    PHYSICAL EXAM:  GENERAL: NAD, well-developed  HEAD:  Atraumatic, Normocephalic  EYES: EOMI, conjunctiva and sclera clear  NECK: Supple, No JVD  CHEST/LUNG: occasional crackles at base of lungs; very mild wheeze auscultated b/l  HEART: S1,S2, regular rate and rhythm; no murmurs, rubs, or gallops  ABDOMEN: Soft, Nontender, nondistended; Bowel sounds present  EXTREMITIES:  well-perfused, No clubbing, cyanosis, no edema   PSYCH: AAOx4  NEUROLOGY: non-focal  SKIN: eft thigh skin tag, round pedunculated, no erythema or induration (1 in. diameter)    Consultant(s) Notes Reviewed:  [x ] YES  [ ] NO  Care Discussed with Consultants/Other Providers [ x] YES  [ ] NO      07-27    136  |  101  |  16  ----------------------------<  87  3.7   |  21<L>  |  1.05  07-26    135  |  102  |  16  ----------------------------<  94  3.9   |  22  |  1.09  07-25    136  |  102  |  17  ----------------------------<  94  4.0   |  21<L>  |  1.14    Ca    8.6      27 Jul 2020 05:33  Ca    8.9      26 Jul 2020 06:32  Ca    9.0      25 Jul 2020 06:27  Phos  3.9     07-27  Mg     2.2     07-27      Magnesium, Serum: 2.2 mg/dL (07-27-20 @ 05:33)  Magnesium, Serum: 2.2 mg/dL (07-26-20 @ 06:32)  Magnesium, Serum: 2.2 mg/dL (07-25-20 @ 06:27)    Phosphorus Level, Serum: 3.9 mg/dL (07-27-20 @ 05:33)  Phosphorus Level, Serum: 3.7 mg/dL (07-26-20 @ 06:32)  Phosphorus Level, Serum: 3.5 mg/dL (07-25-20 @ 06:27)                                              10.9   10.28 )-----------( 375      ( 27 Jul 2020 05:33 )             37.7                         11.6   11.06 )-----------( 416      ( 26 Jul 2020 06:32 )             39.7                         11.7   9.81  )-----------( 444      ( 25 Jul 2020 06:27 )             40.4       CAPILLARY BLOOD GLUCOSE                Microbiology

## 2020-07-27 NOTE — DIETITIAN INITIAL EVALUATION ADULT. - PROBLEM SELECTOR PROBLEM 5
AVS was printed, reviewed and given to the patient following the office visit today.    Patient was provided with Centralized scheduling card to set up Barium Esophagram and Chest X-ray.     Anemia

## 2020-07-27 NOTE — DIETITIAN INITIAL EVALUATION ADULT. - ADD RECOMMEND
Recommend continue DASH/TLC diet to promote heart health. Discussed with pt more speciifc recommendations re: regular meal intake as able to help prevent sodium load from one large meal daily; reviewed recommended sodium intake daily, nutrition-label reading and how to identify low sodium items. Consider fluid restriction as deemed necessary, pt currently fluid net negative per flowsheet records.

## 2020-07-27 NOTE — PROGRESS NOTE ADULT - ATTENDING COMMENTS
Seen, examined the patient this afternoon  Resting in bed, c/o mild HA. no fever, SOB  - Reviewed labs, imaging  - Pulmonary and cardiology plans noted, readjusting with increase dose of treprostinil from 17 to 18 ng/kg/min   - will continue with lasix, aldactone, prednisone, xarelto, atrovent/spiriva  - d/c planning in 2 days if tolerates

## 2020-07-27 NOTE — PROGRESS NOTE ADULT - PROBLEM SELECTOR PLAN 1
TTE with RV enlargement and LV dysfunction possibly 2/2 to RHF. Elevated BNP with troponins 6<-7<-10<-9.   -continue Prednisone  - prn ipratropium  - s/p RHC 7/23: RA 4, RV 80/4 PA 80/40 57 PCWP 22 (24) PA 51, A0 91 Xenia 4.1/2.0   PVR wedge 8.7, PVR LVEDP 13.3  - Recs from Cardiology: Cath shows severe pulmonary HTN, f/u with Pulm recs re Remodulin dose. RA pressure is low. d/c lasix. could d/c on lasix 40 tiw and aldactone 25.  -Lasix d'c'd yesterday, started on aldactone 25mg PO daily, plan to d/c on lasix three times a week and aldactone daily. TTE with RV enlargement and LV dysfunction possibly 2/2 to RHF. Elevated BNP with troponins 6<-7<-10<-9.   -continue Prednisone  - prn ipratropium  - s/p RHC 7/23: RA 4, RV 80/4 PA 80/40 57 PCWP 22 (24) PA 51, A0 91 Xenia 4.1/2.0   PVR wedge 8.7, PVR LVEDP 13.3  - Recs from Cardiology: Cath shows severe pulmonary HTN, f/u with Pulm recs re Remodulin dose. RA pressure is low. d/c lasix. could d/c on lasix 40 tiw and aldactone 25.  -Lasix d'c'd yesterday, started on aldactone 25mg PO daily, plan to d/c on lasix 20mg PO daily and aldactone daily.

## 2020-07-27 NOTE — PROGRESS NOTE ADULT - ATTENDING COMMENTS
Patient seen and examined.  Tolerating the increased dose of remodulin with just some headache and intermittent jaw pain.  Denies chest discomfort or dizziness.  She is hemodynamically stable.  Would increase remodulin dose by 1 ng tomorrow, observe for the day and night with potential discharge on Wednesday if she is feeling well.

## 2020-07-27 NOTE — PROGRESS NOTE ADULT - PROBLEM SELECTOR PLAN 5
Family hx of anemia, currently at baseline   - Monitor CBC for sudden decrease from baseline, Hg11 on admission.  - H&H stable

## 2020-07-28 LAB
ANION GAP SERPL CALC-SCNC: 14 MMOL/L — SIGNIFICANT CHANGE UP (ref 5–17)
BUN SERPL-MCNC: 18 MG/DL — SIGNIFICANT CHANGE UP (ref 7–23)
CALCIUM SERPL-MCNC: 8.6 MG/DL — SIGNIFICANT CHANGE UP (ref 8.4–10.5)
CHLORIDE SERPL-SCNC: 103 MMOL/L — SIGNIFICANT CHANGE UP (ref 96–108)
CO2 SERPL-SCNC: 19 MMOL/L — LOW (ref 22–31)
CREAT SERPL-MCNC: 1.11 MG/DL — SIGNIFICANT CHANGE UP (ref 0.5–1.3)
GLUCOSE SERPL-MCNC: 88 MG/DL — SIGNIFICANT CHANGE UP (ref 70–99)
HCT VFR BLD CALC: 36.3 % — SIGNIFICANT CHANGE UP (ref 34.5–45)
HGB BLD-MCNC: 10.6 G/DL — LOW (ref 11.5–15.5)
MAGNESIUM SERPL-MCNC: 1.9 MG/DL — SIGNIFICANT CHANGE UP (ref 1.6–2.6)
MCHC RBC-ENTMCNC: 18.4 PG — LOW (ref 27–34)
MCHC RBC-ENTMCNC: 29.2 GM/DL — LOW (ref 32–36)
MCV RBC AUTO: 63 FL — LOW (ref 80–100)
NRBC # BLD: 0 /100 WBCS — SIGNIFICANT CHANGE UP (ref 0–0)
PHOSPHATE SERPL-MCNC: 4.2 MG/DL — SIGNIFICANT CHANGE UP (ref 2.5–4.5)
PLATELET # BLD AUTO: 403 K/UL — HIGH (ref 150–400)
POTASSIUM SERPL-MCNC: 4 MMOL/L — SIGNIFICANT CHANGE UP (ref 3.5–5.3)
POTASSIUM SERPL-SCNC: 4 MMOL/L — SIGNIFICANT CHANGE UP (ref 3.5–5.3)
RBC # BLD: 5.76 M/UL — HIGH (ref 3.8–5.2)
RBC # FLD: 19.5 % — HIGH (ref 10.3–14.5)
SODIUM SERPL-SCNC: 136 MMOL/L — SIGNIFICANT CHANGE UP (ref 135–145)
WBC # BLD: 10.17 K/UL — SIGNIFICANT CHANGE UP (ref 3.8–10.5)
WBC # FLD AUTO: 10.17 K/UL — SIGNIFICANT CHANGE UP (ref 3.8–10.5)

## 2020-07-28 PROCEDURE — 99232 SBSQ HOSP IP/OBS MODERATE 35: CPT

## 2020-07-28 PROCEDURE — 99233 SBSQ HOSP IP/OBS HIGH 50: CPT

## 2020-07-28 PROCEDURE — 99233 SBSQ HOSP IP/OBS HIGH 50: CPT | Mod: GC

## 2020-07-28 RX ORDER — FUROSEMIDE 40 MG
20 TABLET ORAL ONCE
Refills: 0 | Status: COMPLETED | OUTPATIENT
Start: 2020-07-29 | End: 2020-07-29

## 2020-07-28 RX ADMIN — RIVAROXABAN 10 MILLIGRAM(S): KIT at 17:35

## 2020-07-28 RX ADMIN — PANTOPRAZOLE SODIUM 40 MILLIGRAM(S): 20 TABLET, DELAYED RELEASE ORAL at 05:50

## 2020-07-28 RX ADMIN — Medication 650 MILLIGRAM(S): at 12:08

## 2020-07-28 RX ADMIN — Medication 500 MICROGRAM(S): at 22:58

## 2020-07-28 RX ADMIN — Medication 10 MILLIGRAM(S): at 05:50

## 2020-07-28 RX ADMIN — Medication 500 MICROGRAM(S): at 08:26

## 2020-07-28 RX ADMIN — Medication 500 MICROGRAM(S): at 00:27

## 2020-07-28 RX ADMIN — Medication 650 MILLIGRAM(S): at 12:40

## 2020-07-28 NOTE — PROGRESS NOTE ADULT - SUBJECTIVE AND OBJECTIVE BOX
ROXI MARIA  40y  Female    Michelle Cannon MD PGY1 [phone]    Subjective: This morning she reported her breathing was worse overall d/t her menstrual cycle, she said "before, during and after" her cycle her breathing worsens, she reports having d/w Dr. Yoon. She reported no wheezing at the moment having just had a nebulizer treatment. On rounds she reported having increased the remodulin dose on her pump at 7am. She c/o headaches, recurrent, bandlike and diffuse, relieved with tylenol. No vision changes, chest pain, palpitations at baseline. No abdominal pain, edema or weakness, difficulty stooling or dysuria.     Yesterday I spoke with her about Lasix and she does not want it as she does not like that overdiuresis leads to low blood pressure for her. She refused her lasix this morning.     MEDICATIONS  (STANDING):  furosemide    Tablet 20 milliGRAM(s) Oral daily  pantoprazole    Tablet 40 milliGRAM(s) Oral before breakfast  predniSONE   Tablet 10 milliGRAM(s) Oral daily  Remodulin (Treprostinil) vial 50 milliGRAM(s),IV Solution 20 milliLiter(s) 50 milliGRAM(s) (17 NANOGram(s)/kG/Min) IV Continuous <Continuous>  rivaroxaban 10 milliGRAM(s) Oral with dinner  spironolactone 25 milliGRAM(s) Oral daily  tiotropium 18 MICROgram(s) Capsule 1 Capsule(s) Inhalation daily    MEDICATIONS  (PRN):  acetaminophen   Tablet .. 650 milliGRAM(s) Oral every 6 hours PRN Temp greater or equal to 38C (100.4F), Moderate Pain (4 - 6)  ipratropium    for Nebulization 500 MICROGram(s) Nebulizer every 6 hours PRN Shortness of Breath  levalbuterol Inhalation 0.63 milliGRAM(s) Inhalation every 4 hours PRN SOB/wheezing        Vital Signs Last 24 Hrs  T(C): 36.9 (28 Jul 2020 12:09), Max: 36.9 (28 Jul 2020 12:09)  T(F): 98.4 (28 Jul 2020 12:09), Max: 98.4 (28 Jul 2020 12:09)  HR: 95 (28 Jul 2020 12:09) (95 - 107)  BP: 104/75 (28 Jul 2020 12:09) (93/70 - 104/75)  BP(mean): --  RR: 18 (28 Jul 2020 12:09) (17 - 18)  SpO2: 97% (28 Jul 2020 12:09) (96% - 97%)    PHYSICAL EXAM:  GENERAL: NAD, well-developed  HEAD:  Atraumatic, Normocephalic  CHEST/LUNG: occasional crackles at base of lungs; very mild wheeze on L base.  HEART: S1,S2, regular rate and rhythm; no murmurs, rubs, or gallops  ABDOMEN: Soft, Nontender, nondistended; Bowel sounds present  EXTREMITIES:  well-perfused, No clubbing, cyanosis, no edema   PSYCH: AAOx4  NEUROLOGY: No focal deficits, pleasant, awake.   SKIN: left thigh skin tag, round pedunculated, no erythema or induration (1 in. diameter)  Consultant(s) Notes Reviewed:  [x ] YES  [ ] NO  Care Discussed with Consultants/Other Providers [ x] YES  [ ] NO      07-28    136  |  103  |  18  ----------------------------<  88  4.0   |  19<L>  |  1.11  07-27    136  |  101  |  16  ----------------------------<  87  3.7   |  21<L>  |  1.05  07-26    135  |  102  |  16  ----------------------------<  94  3.9   |  22  |  1.09    Ca    8.6      28 Jul 2020 06:40  Ca    8.6      27 Jul 2020 05:33  Ca    8.9      26 Jul 2020 06:32  Phos  4.2     07-28  Mg     1.9     07-28      Magnesium, Serum: 1.9 mg/dL (07-28-20 @ 06:40)  Magnesium, Serum: 2.2 mg/dL (07-27-20 @ 05:33)  Magnesium, Serum: 2.2 mg/dL (07-26-20 @ 06:32)    Phosphorus Level, Serum: 4.2 mg/dL (07-28-20 @ 06:40)  Phosphorus Level, Serum: 3.9 mg/dL (07-27-20 @ 05:33)  Phosphorus Level, Serum: 3.7 mg/dL (07-26-20 @ 06:32)                                              10.6   10.17 )-----------( 403      ( 28 Jul 2020 06:41 )             36.3                         10.9   10.28 )-----------( 375      ( 27 Jul 2020 05:33 )             37.7                         11.6   11.06 )-----------( 416      ( 26 Jul 2020 06:32 )             39.7       CAPILLARY BLOOD GLUCOSE                Microbiology

## 2020-07-28 NOTE — PROGRESS NOTE ADULT - ATTENDING COMMENTS
meds: remodulin 19, xarelto, pred 10, ald 25, lasix 20 refusing.   afebrile, 80-100SR, 94/61 108/70 198 (up 2 lbs)   07-28    136  |  103  |  18  ----------------------------<  88  4.0   |  19<L>  |  1.11  Ca    8.6      28 Jul 2020 06:40  Phos  4.2     07-28  Mg     1.9     07-28    For d/c tomorrow on higher dose remodulin for f/u with Dr Mas.   will advise patient to try low dose diuretics.   William Harper

## 2020-07-28 NOTE — PROGRESS NOTE ADULT - ATTENDING COMMENTS
Seen, examined the patient this morning with house staff  Sitting in chair, c/o mild HA, no fever, N/V. says she has known migraine   - Reviewed labs, imaging  - Pulmonary and HF cardiology plans noted,     Increased dose of treprostinil from 18 to 19 ng/kg/min this am per Pulmonary- Dr Yoon  - will continue with aldactone, prednisone, xarelto, atrovent/spiriva    patient refused Lasix at this time, wants to take only tylenol for HA  - d/c planning tomorrow if tolerate current dose.

## 2020-07-28 NOTE — PROGRESS NOTE ADULT - ATTENDING COMMENTS
Patient seen and examined, data reviewed.  She is HD stable.  Tolerating gradual increase in remodulin.  Would plan on discharge tomorrow if she remains stable today.

## 2020-07-28 NOTE — PROGRESS NOTE ADULT - PROBLEM SELECTOR PLAN 1
TTE with RV enlargement and LV dysfunction possibly 2/2 to RHF. Elevated BNP with troponins 6<-7<-10<-9.   -continue Prednisone  - prn ipratropium  - s/p RHC 7/23: RA 4, RV 80/4 PA 80/40 57 PCWP 22 (24) PA 51, A0 91 Xenia 4.1/2.0   PVR wedge 8.7, PVR LVEDP 13.3  - Recs from Cardiology: Cath shows severe pulmonary HTN, f/u with Pulm recs re Remodulin dose. RA pressure is low. d/c lasix. could d/c on lasix 40 tiw and aldactone 25.  -Lasix d'c'd yesterday, started on aldactone 25mg PO daily, plan to d/c on lasix 20mg PO daily and aldactone daily. TTE with RV enlargement and LV dysfunction possibly 2/2 to RHF. Elevated BNP with troponins 6<-7<-10<-9.   -continue Prednisone  - prn ipratropium  - s/p RHC 7/23: RA 4, RV 80/4 PA 80/40 57 PCWP 22 (24) PA 51, A0 91 Xenia 4.1/2.0   PVR wedge 8.7, PVR LVEDP 13.3  - Recs from Cardiology: Cath shows severe pulmonary HTN, f/u with Pulm recs re Remodulin dose. RA pressure is low. d/c lasix. could d/c on lasix 40 tiw and aldactone 25.  -Lasix d'c'd yesterday, started on aldactone 25mg PO daily, plan to d/c on lasix 20mg PO daily and aldactone daily. - Pt refusing lasix, on spironolactone.

## 2020-07-28 NOTE — PROGRESS NOTE ADULT - PROBLEM SELECTOR PLAN 2
- As above  - remodulin dose increased by patient with pump, per Dr. Yoon and Rosie aguero  - dose increased from 17 to 18 ng/kg/min with plan to increase dose gradually every 3 days as tolerated by patient - As above  - remodulin dose increased by patient with pump, per Dr. Yoon and Rosie aguero  - dose increased from 18 to 19 ng/kg/min today with plan to increase dose gradually every 3 days as tolerated by patient

## 2020-07-28 NOTE — PROGRESS NOTE ADULT - SUBJECTIVE AND OBJECTIVE BOX
Subjective:   Breathing is worse today attributed to start of menstrual cycle which regularly increases cardiac and pulmonary symptoms for her. She also said that she has not been taking her prescribed lasix since coming up to the floors and doesn't want to take it when she goes home. She was complaining of some wheezing but was much better after her nebulization treatment. She increased the dose of remodulin on her pump this morning at 7AM. Otherwise denies headache, jaw pain, chest pain, palpitations, cough, abdominal pain, nausea, vomiting, diarrhea, and constipation.    PAST MEDICAL & SURGICAL HISTORY:  Right heart failure  Corneal disorder  Sinusitis  PE (pulmonary thromboembolism): on Xarelto 10 mg daily  Asthma: On home O2 nightly at 2L via NC  Pulmonary embolism  Pulmonary hypertension  Anemia  Tachycardia  Pacemaker  History of tubal ligation      FAMILY HISTORY:  FH: anemia  Family history of diabetes mellitus in mother  Family history of diabetes mellitus: in brother      Allergies    penicillin (Unknown)  vancomycin (Unknown)    Intolerances    albuterol (Other; Rash)      HOME MEDICATIONS:    REVIEW OF SYSTEMS:  Constitutional: No fevers or chills. No weight loss. No fatigue or generalised malaise.  Eyes: No itching or discharge from the eyes  ENT: No ear pain. No ear discharge. No nasal congestion. No post nasal drip. No epistaxis. No throat pain. No sore throat. No difficulty swallowing.   CV: No chest pain. No palpitations. No lightheadedness or dizziness.   Resp: + dyspnea at rest. No orthopnea. No wheezing. No cough. No stridor. No sputum production. No chest pain with respiration.  GI: No nausea. No vomiting. No diarrhea.  MSK: No joint pain or pain in any extremities  Integumentary: No skin lesions. No pedal edema.  Neurological: No gross motor weakness. No sensory changes.    [X ] All other systems negative  [ ] Unable to assess ROS because ________    OBJECTIVE:  ICU Vital Signs Last 24 Hrs  T(C): 36.5 (28 Jul 2020 05:30), Max: 36.8 (27 Jul 2020 12:12)  T(F): 97.7 (28 Jul 2020 05:30), Max: 98.3 (27 Jul 2020 12:12)  HR: 107 (28 Jul 2020 05:30) (85 - 107)  BP: 94/61 (28 Jul 2020 05:30) (93/70 - 108/75)  RR: 17 (28 Jul 2020 05:30) (17 - 18)  SpO2: 96% (28 Jul 2020 05:30) (96% - 97%)        07-27 @ 07:01  -  07-28 @ 07:00  --------------------------------------------------------  IN: 820 mL / OUT: 0 mL / NET: 820 mL      CAPILLARY BLOOD GLUCOSE          PHYSICAL EXAM:  General: Awake, alert, oriented X 3.   Respiratory: Good air movement. mild inspiratory and expiratory wheezes bilaterally.  Cardiovascular: S1 S2 normal. No murmurs, rubs or gallops.   Abdomen: Soft, non-tender, non-distended. No organomegaly.  Extremities: Warm to touch. Peripheral pulse palpable. No pedal edema.   Skin: No rashes or skin lesions  Neurological: Motor and sensory examination equal and normal in all four extremities.  Psychiatry: Appropriate mood and affect.    HOSPITAL MEDICATIONS:  MEDICATIONS  (STANDING):  furosemide    Tablet 20 milliGRAM(s) Oral daily  pantoprazole    Tablet 40 milliGRAM(s) Oral before breakfast  predniSONE   Tablet 10 milliGRAM(s) Oral daily  Remodulin (Treprostinil) vial 50 milliGRAM(s),IV Solution 20 milliLiter(s) 50 milliGRAM(s) (17 NANOGram(s)/kG/Min) IV Continuous <Continuous>  rivaroxaban 10 milliGRAM(s) Oral with dinner  spironolactone 25 milliGRAM(s) Oral daily  tiotropium 18 MICROgram(s) Capsule 1 Capsule(s) Inhalation daily    MEDICATIONS  (PRN):  acetaminophen   Tablet .. 650 milliGRAM(s) Oral every 6 hours PRN Temp greater or equal to 38C (100.4F), Moderate Pain (4 - 6)  ipratropium    for Nebulization 500 MICROGram(s) Nebulizer every 6 hours PRN Shortness of Breath  levalbuterol Inhalation 0.63 milliGRAM(s) Inhalation every 4 hours PRN SOB/wheezing      LABS:                        10.6   10.17 )-----------( 403      ( 28 Jul 2020 06:41 )             36.3     07-28    136  |  103  |  18  ----------------------------<  88  4.0   |  19<L>  |  1.11    Ca    8.6      28 Jul 2020 06:40  Phos  4.2     07-28  Mg     1.9     07-28    < from: Cardiac Cath Lab - Adult (07.23.20 @ 09:26) >  DIAGNOSTIC IMPRESSIONS: Low right heart pressure  Severe pulmonary hypertension (sPAP 80mmHg, mPAP 57mmHg)  PCWP = 22mmHg with a V wave of 24mmHg  LVEDP = 4mmHg  No aortic valve stenosis  PVR = 8.57Wu  Xenia CO // CI = 4.00l/min // 2.04l/min/m2    < end of copied text >

## 2020-07-28 NOTE — PROGRESS NOTE ADULT - PROBLEM SELECTOR PLAN 3
Pt has baseline asthma, getting worse over past month, atrovent requirement now up to 5 times a day. O/N used atrovent once.  - c/w home atrovent and spiriva, levalbuterol as needed

## 2020-07-28 NOTE — PROGRESS NOTE ADULT - SUBJECTIVE AND OBJECTIVE BOX
Subjective:    Medications:  acetaminophen   Tablet .. 650 milliGRAM(s) Oral every 6 hours PRN  furosemide    Tablet 20 milliGRAM(s) Oral daily  ipratropium    for Nebulization 500 MICROGram(s) Nebulizer every 6 hours PRN  levalbuterol Inhalation 0.63 milliGRAM(s) Inhalation every 4 hours PRN  pantoprazole    Tablet 40 milliGRAM(s) Oral before breakfast  predniSONE   Tablet 10 milliGRAM(s) Oral daily  Remodulin (Treprostinil) vial 50 milliGRAM(s),IV Solution 20 milliLiter(s) 50 milliGRAM(s) IV Continuous <Continuous>  rivaroxaban 10 milliGRAM(s) Oral with dinner  spironolactone 25 milliGRAM(s) Oral daily  tiotropium 18 MICROgram(s) Capsule 1 Capsule(s) Inhalation daily      Physical Exam:    Vitals:  Vital Signs Last 24 Hours  T(C): 36.5 (20 @ 05:30), Max: 36.8 (20 @ 12:12)  HR: 107 (20 @ 05:30) (85 - 107)  BP: 94/61 (20 @ 05:30) (93/70 - 108/75)  RR: 17 (20 @ 05:30) (17 - 18)  SpO2: 96% (20 @ 05:30) (96% - 97%)    Weight in k.1 ( @ 08:34)    I&O's Summary     07:00  --------------------------------------------------------  IN: 820 mL / OUT: 0 mL / NET: 820 mL      -  2020 12:05  --------------------------------------------------------  IN: 240 mL / OUT: 0 mL / NET: 240 mL        Tele:    General: No distress. Comfortable.  HEENT: EOM intact.  Neck: Neck supple. JVP not elevated. No masses  Chest: Clear to auscultation bilaterally  CV: Normal S1 and S2. No murmurs, rub, or gallops. Radial pulses normal.  Abdomen: Soft, non-distended, non-tender  Skin: No rashes or skin breakdown  Neurology: Alert and oriented times three. Sensation intact  Psych: Affect normal    Labs:                        10.6   10.17 )-----------( 403      ( 2020 06:41 )             36.3         136  |  103  |  18  ----------------------------<  88  4.0   |  19<L>  |  1.11    Ca    8.6      2020 06:40  Phos  4.2       Mg     1.9      Subjective:  - Started her menstrual cycle. Notes increased SOB and heart racing today as she typically does with start of her menstrual cycle  - Reports ongoing headache  - Denies orthopnea    Medications:  acetaminophen   Tablet .. 650 milliGRAM(s) Oral every 6 hours PRN  furosemide    Tablet 20 milliGRAM(s) Oral daily  ipratropium    for Nebulization 500 MICROGram(s) Nebulizer every 6 hours PRN  levalbuterol Inhalation 0.63 milliGRAM(s) Inhalation every 4 hours PRN  pantoprazole    Tablet 40 milliGRAM(s) Oral before breakfast  predniSONE   Tablet 10 milliGRAM(s) Oral daily  Remodulin (Treprostinil) vial 50 milliGRAM(s),IV Solution 20 milliLiter(s) 50 milliGRAM(s) IV Continuous <Continuous>  rivaroxaban 10 milliGRAM(s) Oral with dinner  spironolactone 25 milliGRAM(s) Oral daily  tiotropium 18 MICROgram(s) Capsule 1 Capsule(s) Inhalation daily      Physical Exam:    Vitals:  Vital Signs Last 24 Hours  T(C): 36.5 (20 @ 05:30), Max: 36.8 (20 @ 12:12)  HR: 107 (20 @ 05:30) (85 - 107)  BP: 94/61 (20 @ 05:30) (93/70 - 108/75)  RR: 17 (20 @ 05:30) (17 - 18)  SpO2: 96% (20 @ 05:30) (96% - 97%)    Weight in k.1 ( @ 08:34)    I&O's Summary    2020 07:  -  2020 07:00  --------------------------------------------------------  IN: 820 mL / OUT: 0 mL / NET: 820 mL    :  -  2020 12:05  --------------------------------------------------------  IN: 240 mL / OUT: 0 mL / NET: 240 mL    Tele: not on tele    General: No distress. Comfortable.  HEENT: EOM intact.  Neck: Neck supple. JVP not elevated. No masses  Chest: Clear to auscultation bilaterally  CV: Normal S1 and S2. No murmurs, rub, or gallops. Radial pulses normal. No LE edema  Abdomen: Soft, non-distended, non-tender  Skin: No rashes or skin breakdown. warm peripherally  Neurology: Alert and oriented times three. Sensation intact  Psych: Affect normal    Labs:                        10.6   10.17 )-----------( 403      ( 2020 06:41 )             36.3         136  |  103  |  18  ----------------------------<  88  4.0   |  19<L>  |  1.11    Ca    8.6      2020 06:40  Phos  4.2       Mg     1.9

## 2020-07-28 NOTE — PROGRESS NOTE ADULT - ASSESSMENT
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 Presentation possibly due to RV volume overload, will trial diuresis and reassess improvement of symptoms.    Pertinent Studies:  RHC 7/23/20:  RA 4, RV 80/4 PA 80/40 57 PCWP 22 (24) PA 51, A0 91 Xenia 4.1/2.0   PVR wedge 8.7, PVR LVEDP 13.3   RHC 5/4/2018: HR 88 bpm, RAP 4, PAP 88/31 (51), CWP 6, PA sat 61% for CO/CI 5.7/28, PVR 8.6 MONACO  TTE 11/2019: normal LV systolic function. TAJ, RV enlargement with decreased RV systolic function. Mild TR. PaSP approximately 55    TTE7/2020: RVSP > 100

## 2020-07-28 NOTE — PROGRESS NOTE ADULT - ASSESSMENT
59 yo woman with pulmonary hypertension likely WHO class I and possible component of class IV given history of pulmonary embolism (previously on riociguat, currently on remodulin), on anticoagulation with half dose xarelto due to anemia, bradycardia s/p pacemaker ([place], 2016), 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 Underwent right and left heart cath consistent which revealed worsening pulmonary hypertension and low LVEDP (mPAP57, mPCWP 22, LVEDP 4).     Recommendations:  - Discussed with Dr. Yoon  - dose of treprostinil increased from 18 to 19 ng/kg/min today  - If able to tolerate, will increase dose gradually every 3 days to goal of 25  - Continue spironolactone 25 mg daily  - Continue prednisone 10 mg daily  - Continue ipratropium and may use levalbuterol as needed  - Continue nocturnal oxygen  - Continue xarelto given history of pulmonary embolism  - Patient has been refusing lasix since coming up from ED. Given clinical improvement can d/c order for lasix and have patient discuss it with Dr. Yoon after discharge.  - At home with televisit with Dr. Yoon on Friday    Kye Retana  PGY1 - Internal Medicine  Pager: [phone]    **NOTE INCOMPLETE UNTIL SIGNED BY ATTENDING**

## 2020-07-28 NOTE — PROGRESS NOTE ADULT - PROBLEM SELECTOR PLAN 1
- c/w home remodulin, will need uptitration per Dr. Yoon  - Please start lasix 20mg PO every other day tomorrow. Pt agreeable.  - LVEDP 4, with significantly elevated PVR as above  - Continue jacqueline. Follow up BMP to reassess renal function and potassium  - possible DC home tomorrow after uptitration of remodulin per Dr. Yoon, can dc on lasix 20 PO every other day  - Follow up with Dr. Girish Garcia in HF clinic post discharge. [phone]

## 2020-07-29 ENCOUNTER — TRANSCRIPTION ENCOUNTER (OUTPATIENT)
Age: 40
End: 2020-07-29

## 2020-07-29 VITALS
TEMPERATURE: 98 F | SYSTOLIC BLOOD PRESSURE: 100 MMHG | HEART RATE: 84 BPM | OXYGEN SATURATION: 96 % | DIASTOLIC BLOOD PRESSURE: 69 MMHG | RESPIRATION RATE: 18 BRPM

## 2020-07-29 PROBLEM — I50.810 RIGHT HEART FAILURE, UNSPECIFIED: Chronic | Status: ACTIVE | Noted: 2020-07-21

## 2020-07-29 PROBLEM — J32.9 CHRONIC SINUSITIS, UNSPECIFIED: Chronic | Status: ACTIVE | Noted: 2020-07-21

## 2020-07-29 PROBLEM — H18.9 UNSPECIFIED DISORDER OF CORNEA: Chronic | Status: ACTIVE | Noted: 2020-07-21

## 2020-07-29 LAB
ANION GAP SERPL CALC-SCNC: 15 MMOL/L — SIGNIFICANT CHANGE UP (ref 5–17)
BUN SERPL-MCNC: 13 MG/DL — SIGNIFICANT CHANGE UP (ref 7–23)
CALCIUM SERPL-MCNC: 8.8 MG/DL — SIGNIFICANT CHANGE UP (ref 8.4–10.5)
CHLORIDE SERPL-SCNC: 104 MMOL/L — SIGNIFICANT CHANGE UP (ref 96–108)
CO2 SERPL-SCNC: 19 MMOL/L — LOW (ref 22–31)
CREAT SERPL-MCNC: 1.02 MG/DL — SIGNIFICANT CHANGE UP (ref 0.5–1.3)
GLUCOSE SERPL-MCNC: 84 MG/DL — SIGNIFICANT CHANGE UP (ref 70–99)
HCT VFR BLD CALC: 38.7 % — SIGNIFICANT CHANGE UP (ref 34.5–45)
HGB BLD-MCNC: 11.2 G/DL — LOW (ref 11.5–15.5)
MAGNESIUM SERPL-MCNC: 2.1 MG/DL — SIGNIFICANT CHANGE UP (ref 1.6–2.6)
MCHC RBC-ENTMCNC: 18.2 PG — LOW (ref 27–34)
MCHC RBC-ENTMCNC: 28.9 GM/DL — LOW (ref 32–36)
MCV RBC AUTO: 62.9 FL — LOW (ref 80–100)
NRBC # BLD: 0 /100 WBCS — SIGNIFICANT CHANGE UP (ref 0–0)
PHOSPHATE SERPL-MCNC: 3.9 MG/DL — SIGNIFICANT CHANGE UP (ref 2.5–4.5)
PLATELET # BLD AUTO: 409 K/UL — HIGH (ref 150–400)
POTASSIUM SERPL-MCNC: 4 MMOL/L — SIGNIFICANT CHANGE UP (ref 3.5–5.3)
POTASSIUM SERPL-SCNC: 4 MMOL/L — SIGNIFICANT CHANGE UP (ref 3.5–5.3)
RBC # BLD: 6.15 M/UL — HIGH (ref 3.8–5.2)
RBC # FLD: 19.8 % — HIGH (ref 10.3–14.5)
SARS-COV-2 RNA SPEC QL NAA+PROBE: SIGNIFICANT CHANGE UP
SODIUM SERPL-SCNC: 138 MMOL/L — SIGNIFICANT CHANGE UP (ref 135–145)
WBC # BLD: 10.5 K/UL — SIGNIFICANT CHANGE UP (ref 3.8–10.5)
WBC # FLD AUTO: 10.5 K/UL — SIGNIFICANT CHANGE UP (ref 3.8–10.5)

## 2020-07-29 PROCEDURE — 71046 X-RAY EXAM CHEST 2 VIEWS: CPT

## 2020-07-29 PROCEDURE — 99239 HOSP IP/OBS DSCHRG MGMT >30: CPT | Mod: GC

## 2020-07-29 PROCEDURE — C1887: CPT

## 2020-07-29 PROCEDURE — 87635 SARS-COV-2 COVID-19 AMP PRB: CPT

## 2020-07-29 PROCEDURE — 84295 ASSAY OF SERUM SODIUM: CPT

## 2020-07-29 PROCEDURE — 80053 COMPREHEN METABOLIC PANEL: CPT

## 2020-07-29 PROCEDURE — 93005 ELECTROCARDIOGRAM TRACING: CPT

## 2020-07-29 PROCEDURE — 83880 ASSAY OF NATRIURETIC PEPTIDE: CPT

## 2020-07-29 PROCEDURE — 83605 ASSAY OF LACTIC ACID: CPT

## 2020-07-29 PROCEDURE — 96374 THER/PROPH/DIAG INJ IV PUSH: CPT | Mod: XU

## 2020-07-29 PROCEDURE — 80048 BASIC METABOLIC PNL TOTAL CA: CPT

## 2020-07-29 PROCEDURE — 83735 ASSAY OF MAGNESIUM: CPT

## 2020-07-29 PROCEDURE — 82803 BLOOD GASES ANY COMBINATION: CPT

## 2020-07-29 PROCEDURE — 93306 TTE W/DOPPLER COMPLETE: CPT

## 2020-07-29 PROCEDURE — 86703 HIV-1/HIV-2 1 RESULT ANTBDY: CPT

## 2020-07-29 PROCEDURE — 84132 ASSAY OF SERUM POTASSIUM: CPT

## 2020-07-29 PROCEDURE — 99152 MOD SED SAME PHYS/QHP 5/>YRS: CPT

## 2020-07-29 PROCEDURE — C1769: CPT

## 2020-07-29 PROCEDURE — C1894: CPT

## 2020-07-29 PROCEDURE — 85014 HEMATOCRIT: CPT

## 2020-07-29 PROCEDURE — 94640 AIRWAY INHALATION TREATMENT: CPT

## 2020-07-29 PROCEDURE — 99153 MOD SED SAME PHYS/QHP EA: CPT

## 2020-07-29 PROCEDURE — 85027 COMPLETE CBC AUTOMATED: CPT

## 2020-07-29 PROCEDURE — 84100 ASSAY OF PHOSPHORUS: CPT

## 2020-07-29 PROCEDURE — 85610 PROTHROMBIN TIME: CPT

## 2020-07-29 PROCEDURE — 82330 ASSAY OF CALCIUM: CPT

## 2020-07-29 PROCEDURE — 85730 THROMBOPLASTIN TIME PARTIAL: CPT

## 2020-07-29 PROCEDURE — 86769 SARS-COV-2 COVID-19 ANTIBODY: CPT

## 2020-07-29 PROCEDURE — 93308 TTE F-UP OR LMTD: CPT

## 2020-07-29 PROCEDURE — 84702 CHORIONIC GONADOTROPIN TEST: CPT

## 2020-07-29 PROCEDURE — 82435 ASSAY OF BLOOD CHLORIDE: CPT

## 2020-07-29 PROCEDURE — 99285 EMERGENCY DEPT VISIT HI MDM: CPT | Mod: 25

## 2020-07-29 PROCEDURE — 71275 CT ANGIOGRAPHY CHEST: CPT

## 2020-07-29 PROCEDURE — 93453 R&L HRT CATH W/VENTRICLGRPHY: CPT

## 2020-07-29 PROCEDURE — U0003: CPT

## 2020-07-29 PROCEDURE — 84484 ASSAY OF TROPONIN QUANT: CPT

## 2020-07-29 PROCEDURE — 99233 SBSQ HOSP IP/OBS HIGH 50: CPT | Mod: GC

## 2020-07-29 PROCEDURE — 82947 ASSAY GLUCOSE BLOOD QUANT: CPT

## 2020-07-29 RX ORDER — SPIRONOLACTONE 25 MG/1
1 TABLET, FILM COATED ORAL
Qty: 30 | Refills: 0
Start: 2020-07-29 | End: 2020-08-27

## 2020-07-29 RX ORDER — FUROSEMIDE 40 MG
1 TABLET ORAL
Qty: 30 | Refills: 0
Start: 2020-07-29

## 2020-07-29 RX ORDER — TREPROSTINIL 48 UG/1
0 INHALANT ORAL
Qty: 0 | Refills: 0 | DISCHARGE

## 2020-07-29 RX ADMIN — Medication 20 MILLIGRAM(S): at 06:16

## 2020-07-29 RX ADMIN — PANTOPRAZOLE SODIUM 40 MILLIGRAM(S): 20 TABLET, DELAYED RELEASE ORAL at 06:16

## 2020-07-29 RX ADMIN — Medication 650 MILLIGRAM(S): at 11:45

## 2020-07-29 RX ADMIN — Medication 650 MILLIGRAM(S): at 12:15

## 2020-07-29 RX ADMIN — Medication 500 MICROGRAM(S): at 06:16

## 2020-07-29 RX ADMIN — Medication 10 MILLIGRAM(S): at 06:16

## 2020-07-29 RX ADMIN — SPIRONOLACTONE 25 MILLIGRAM(S): 25 TABLET, FILM COATED ORAL at 06:16

## 2020-07-29 NOTE — PROGRESS NOTE ADULT - PROBLEM SELECTOR PLAN 1
TTE with RV enlargement and LV dysfunction possibly 2/2 to RHF. Elevated BNP with troponins 6<-7<-10<-9.   -continue Prednisone  - prn ipratropium  - s/p RHC 7/23: RA 4, RV 80/4 PA 80/40 57 PCWP 22 (24) PA 51, A0 91 Xenia 4.1/2.0   PVR wedge 8.7, PVR LVEDP 13.3  - Recs from Cardiology: Cath shows severe pulmonary HTN, f/u with Pulm recs re Remodulin dose. RA pressure is low. d/c lasix. could d/c on lasix 40 tiw and aldactone 25.  -Lasix d'c'd yesterday, started on aldactone 25mg PO daily, plan to d/c on lasix 20mg PO daily and aldactone daily. - Pt refusing lasix, on spironolactone.

## 2020-07-29 NOTE — PROGRESS NOTE ADULT - ASSESSMENT
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 Underwent right and left heart cath consistent which revealed worsening pulmonary hypertension and low LVEDP (mPAP57, mPCWP 22, LVEDP 4).     Recommendations:  - Plan for discharge today as long as patient is tolerating remodulin uptitration well.  - Discussed with Dr. Yoon  - dose of treprostinil increased from 18 to 19 ng/kg/min yesterday  - If able to tolerate, will increase dose gradually every 3 days to goal of 25  - Continue spironolactone 25 mg daily  - Continue prednisone 10 mg daily  - Continue ipratropium and may use levalbuterol as needed  - Continue nocturnal oxygen  - Continue xarelto given history of pulmonary embolism  - Patient has been refusing lasix since coming up from ED. Given clinical improvement can d/c order for lasix and have patient discuss it with Dr. Yoon after discharge.  - At home with televisit with Dr. 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 Underwent right and left heart cath consistent which revealed worsening pulmonary hypertension and low LVEDP (mPAP57, mPCWP 22, LVEDP 4).     Recommendations:  - Plan for discharge today as long as patient is tolerating remodulin uptitration well.  - Discussed with Dr. Yoon  - dose of treprostinil increased from 18 to 19 ng/kg/min yesterday  - If able to tolerate, will increase dose gradually every 3 days to goal of 25  - Continue spironolactone 25 mg daily  - Continue prednisone 10 mg daily  - Continue ipratropium and may use levalbuterol as needed  - Continue nocturnal oxygen  - Continue xarelto given history of pulmonary embolism  - At home with televisit with Dr. Yoon on Friday    Kye Retana  PGY1 - Internal Medicine  Pager: [phone]    **NOTE INCOMPLETE UNTIL SIGNED BY ATTENDING**

## 2020-07-29 NOTE — DISCHARGE NOTE NURSING/CASE MANAGEMENT/SOCIAL WORK - PATIENT PORTAL LINK FT
You can access the FollowMyHealth Patient Portal offered by [facility] by registering at the following website: [email or web address]. By joining [facility]’s FollowMyHealth portal, you will also be able to view your health information using other applications (apps) compatible with our system.

## 2020-07-29 NOTE — PROGRESS NOTE ADULT - ATTENDING COMMENTS
Patient seen and examined, data reviewed.  She is feeling well, HD stable.  Plan for discharge today with outpatient follow up with Dr. Yoon.  AGree with current management.

## 2020-07-29 NOTE — PROGRESS NOTE ADULT - SUBJECTIVE AND OBJECTIVE BOX
Subjective/Overnight:      REVIEW OF SYSTEMS:  Constitutional: No fevers or chills. No weight loss. No fatigue or generalised malaise.  Eyes: No itching or discharge from the eyes  ENT: No ear pain. No ear discharge. No nasal congestion. No post nasal drip. No epistaxis. No throat pain. No sore throat. No difficulty swallowing.   CV: No chest pain. No palpitations. No lightheadedness or dizziness.   Resp: No dyspnea at rest. No dyspnea on exertion. No orthopnea. No wheezing. No cough. No stridor. No sputum production. No chest pain with respiration.  GI: No nausea. No vomiting. No diarrhea.  MSK: No joint pain or pain in any extremities  Integumentary: No skin lesions. No pedal edema.  Neurological: No gross motor weakness. No sensory changes.    [ ] All other systems negative  [ ] Unable to assess ROS because ________    OBJECTIVE:  ICU Vital Signs Last 24 Hrs  T(C): 36.9 (29 Jul 2020 05:48), Max: 36.9 (28 Jul 2020 12:09)  T(F): 98.4 (29 Jul 2020 05:48), Max: 98.4 (28 Jul 2020 12:09)  HR: 114 (29 Jul 2020 05:48) (93 - 114)  BP: 103/67 (29 Jul 2020 05:48) (97/67 - 104/75)  RR: 16 (29 Jul 2020 05:48) (16 - 18)  SpO2: 95% (29 Jul 2020 05:48) (95% - 99%)        07-28 @ 07:01  -  07-29 @ 07:00  --------------------------------------------------------  IN: 940 mL / OUT: 0 mL / NET: 940 mL      CAPILLARY BLOOD GLUCOSE          PHYSICAL EXAM:  General: Awake, alert, oriented X 3.   HEENT: Atraumatic, normocephalic.                 Mallampatti Grade                 No nasal congestion.                No tonsillar or pharyngeal exudates.  Lymph Nodes: No palpable lymphadenopathy  Neck: No JVD. No carotid bruit.   Respiratory: Normal chest expansion                         Normal percussion                         Normal and equal air entry                         No wheeze, rhonchi or rales.  Cardiovascular: S1 S2 normal. No murmurs, rubs or gallops.   Abdomen: Soft, non-tender, non-distended. No organomegaly.  Extremities: Warm to touch. Peripheral pulse palpable. No pedal edema.   Skin: No rashes or skin lesions  Neurological: Motor and sensory examination equal and normal in all four extremities.  Psychiatry: Appropriate mood and affect.    HOSPITAL MEDICATIONS:  MEDICATIONS  (STANDING):  pantoprazole    Tablet 40 milliGRAM(s) Oral before breakfast  predniSONE   Tablet 10 milliGRAM(s) Oral daily  Remodulin (Treprostinil) vial 50 milliGRAM(s),IV Solution 20 milliLiter(s) 50 milliGRAM(s) (17 NANOGram(s)/kG/Min) IV Continuous <Continuous>  rivaroxaban 10 milliGRAM(s) Oral with dinner  spironolactone 25 milliGRAM(s) Oral daily  tiotropium 18 MICROgram(s) Capsule 1 Capsule(s) Inhalation daily    MEDICATIONS  (PRN):  acetaminophen   Tablet .. 650 milliGRAM(s) Oral every 6 hours PRN Temp greater or equal to 38C (100.4F), Moderate Pain (4 - 6)  ipratropium    for Nebulization 500 MICROGram(s) Nebulizer every 6 hours PRN Shortness of Breath  levalbuterol Inhalation 0.63 milliGRAM(s) Inhalation every 4 hours PRN SOB/wheezing      LABS:                        11.2   10.50 )-----------( 409      ( 29 Jul 2020 06:09 )             38.7     07-29    138  |  104  |  13  ----------------------------<  84  4.0   |  19<L>  |  1.02    Ca    8.8      29 Jul 2020 06:38  Phos  3.9     07-29  Mg     2.1     07-29 Subjective/Overnight:  Seen and examined at bedside. No acute events overnight. Agreed to take lasix today and with subjective large urine output (not being measured). Breathlessness and palpitations at baseline for her given that menstruation makes symptoms worse. No symptoms related to Remodulin: headache, nausea, jaw pain. Denies fevers, chills, chest pain, cough, abdominal pain, N/V/D/C, leg swelling.     REVIEW OF SYSTEMS:  Constitutional: No fevers or chills. No weight loss. No fatigue or generalized malaise.  Eyes: No itching or discharge from the eyes  ENT: No ear pain. No ear discharge. No nasal congestion. No post nasal drip. No epistaxis. No throat pain. No sore throat. No difficulty swallowing.   CV: No chest pain. + palpitations. No lightheadedness or dizziness.   Resp: + baseline dyspnea at rest. No dyspnea on exertion. No orthopnea. + wheezing. No cough. No stridor. No sputum production. No chest pain with respiration.  GI: No nausea. No vomiting. No diarrhea.  MSK: No joint pain or pain in any extremities  Integumentary: No skin lesions. No pedal edema.  Neurological: No gross motor weakness. No sensory changes.    [x] All other systems negative  [ ] Unable to assess ROS because ________    OBJECTIVE:  ICU Vital Signs Last 24 Hrs  T(C): 36.9 (2020 05:48), Max: 36.9 (2020 12:09)  T(F): 98.4 (2020 05:48), Max: 98.4 (2020 12:09)  HR: 114 (2020 05:48) (93 - 114)  BP: 103/67 (2020 05:48) (97/67 - 104/75)  RR: 16 (2020 05:48) (16 - 18)  SpO2: 95% (2020 05:48) (95% - 99%)    Daily     Daily Weight in k.7 (2020 07:49)  Weight at admission 89.1 (2020)    PHYSICAL EXAM:  General: Awake, alert, oriented X 3.   Respiratory: + inspiratory wheeze. good air entry bilaterally.  Cardiovascular: S1 S2 normal. No murmurs, rubs or gallops.   Abdomen: Soft, non-tender, non-distended. No organomegaly.  Extremities: Warm to touch. Peripheral pulse palpable. No pedal edema.   Skin: No rashes or skin lesions  Neurological: Motor and sensory examination equal and normal in all four extremities.  Psychiatry: Appropriate mood and affect.    HOSPITAL MEDICATIONS:  MEDICATIONS  (STANDING):  pantoprazole    Tablet 40 milliGRAM(s) Oral before breakfast  predniSONE   Tablet 10 milliGRAM(s) Oral daily  Remodulin (Treprostinil) vial 50 milliGRAM(s),IV Solution 20 milliLiter(s) 50 milliGRAM(s) (17 NANOGram(s)/kG/Min) IV Continuous <Continuous>  rivaroxaban 10 milliGRAM(s) Oral with dinner  spironolactone 25 milliGRAM(s) Oral daily  tiotropium 18 MICROgram(s) Capsule 1 Capsule(s) Inhalation daily    MEDICATIONS  (PRN):  acetaminophen   Tablet .. 650 milliGRAM(s) Oral every 6 hours PRN Temp greater or equal to 38C (100.4F), Moderate Pain (4 - 6)  ipratropium    for Nebulization 500 MICROGram(s) Nebulizer every 6 hours PRN Shortness of Breath  levalbuterol Inhalation 0.63 milliGRAM(s) Inhalation every 4 hours PRN SOB/wheezing      LABS:                        11.2   10.50 )-----------( 409      ( 2020 06:09 )             38.7     07-    138  |  104  |  13  ----------------------------<  84  4.0   |  19<L>  |  1.02    Ca    8.8      2020 06:38  Phos  3.9     -  Mg     2.1

## 2020-07-29 NOTE — PROGRESS NOTE ADULT - PROBLEM SELECTOR PROBLEM 1
Right heart failure due to pulmonary hypertension
Pulmonary arterial hypertension
Right heart failure due to pulmonary hypertension
Pulmonary arterial hypertension
Pulmonary arterial hypertension
Right heart failure due to pulmonary hypertension

## 2020-07-29 NOTE — PROGRESS NOTE ADULT - PROBLEM SELECTOR PROBLEM 2
Pulmonary arterial hypertension
Asthma
Pulmonary arterial hypertension
Asthma
Asthma
Pulmonary arterial hypertension

## 2020-07-29 NOTE — PROGRESS NOTE ADULT - PROBLEM SELECTOR PROBLEM 4
Pulmonary embolism

## 2020-07-29 NOTE — PROGRESS NOTE ADULT - ATTENDING COMMENTS
Seen, examined the patient   Feels ok, c/o mild HA, no fever, N/V. hemodynamicallys stable  - Reviewed labs, imaging  - Pulmonary and HF cardiology plans noted,     On treprostinil from 18 to 19 ng/kg/min since yesterday, goal 25 ng/kg/min gradually per Pulmonary  - will continue with aldactone, Lasix 20mg 3 times a week, prednisone, xarelto, atrovent/spiriva,   - d/c home today. Outpatient f/u with Dr Yoon (Pulmonary) in few days  - d/c time 45 min

## 2020-07-29 NOTE — PROGRESS NOTE ADULT - PROBLEM SELECTOR PROBLEM 7
Corneal disorder

## 2020-07-29 NOTE — PROGRESS NOTE ADULT - SUBJECTIVE AND OBJECTIVE BOX
ROXI MARIA  40y  Female    Michelle Cannon MD PGY1 [phone]    O/N Events: No acute events overnight.    Subjective: Ms. Maria is feeling well, reports no cough, wheezing, SOB "only when the asthma comes," reports mild headache, palpitations on exertion.  Denies f/c/n/v/d/c, visual changes, dysuria, weakness. reports agreeing to take 20mg lasix three times a week after speaking with Dr. Harper.     MEDICATIONS  (STANDING):  pantoprazole    Tablet 40 milliGRAM(s) Oral before breakfast  predniSONE   Tablet 10 milliGRAM(s) Oral daily  Remodulin (Treprostinil) vial 50 milliGRAM(s),IV Solution 20 milliLiter(s) 50 milliGRAM(s) (17 NANOGram(s)/kG/Min) IV Continuous <Continuous>  rivaroxaban 10 milliGRAM(s) Oral with dinner  spironolactone 25 milliGRAM(s) Oral daily  tiotropium 18 MICROgram(s) Capsule 1 Capsule(s) Inhalation daily    MEDICATIONS  (PRN):  acetaminophen   Tablet .. 650 milliGRAM(s) Oral every 6 hours PRN Temp greater or equal to 38C (100.4F), Moderate Pain (4 - 6)  ipratropium    for Nebulization 500 MICROGram(s) Nebulizer every 6 hours PRN Shortness of Breath  levalbuterol Inhalation 0.63 milliGRAM(s) Inhalation every 4 hours PRN SOB/wheezing        Vital Signs Last 24 Hrs  T(C): 36.9 (29 Jul 2020 05:48), Max: 36.9 (28 Jul 2020 12:09)  T(F): 98.4 (29 Jul 2020 05:48), Max: 98.4 (28 Jul 2020 12:09)  HR: 114 (29 Jul 2020 05:48) (93 - 114)  BP: 103/67 (29 Jul 2020 05:48) (97/67 - 104/75)  BP(mean): --  RR: 16 (29 Jul 2020 05:48) (16 - 18)  SpO2: 95% (29 Jul 2020 05:48) (95% - 99%)    PHYSICAL EXAM:  GENERAL: NAD, well-developed  HEAD:  Atraumatic, Normocephalic  CHEST/LUNG: occasional crackles at base of lungs; very mild wheeze on L base.  HEART: S1,S2, regular rate and rhythm; no murmurs, rubs, or gallops  ABDOMEN: Soft, Nontender, nondistended; Bowel sounds present  EXTREMITIES:  well-perfused, No clubbing, cyanosis, no edema   PSYCH: AAOx4  NEUROLOGY: No focal deficits, pleasant, awake.   SKIN: left thigh skin tag, round pedunculated, no erythema or induration (1 in. diameter)  Consultant(s) Notes Reviewed:  [x ] YES  [ ] NO  Care Discussed with Consultants/Other Providers [ x] YES  [ ] NO      07-29    138  |  104  |  13  ----------------------------<  84  4.0   |  19<L>  |  1.02  07-28    136  |  103  |  18  ----------------------------<  88  4.0   |  19<L>  |  1.11  07-27    136  |  101  |  16  ----------------------------<  87  3.7   |  21<L>  |  1.05    Ca    8.8      29 Jul 2020 06:38  Ca    8.6      28 Jul 2020 06:40  Ca    8.6      27 Jul 2020 05:33  Phos  3.9     07-29  Mg     2.1     07-29      Magnesium, Serum: 2.1 mg/dL (07-29-20 @ 06:38)  Magnesium, Serum: 1.9 mg/dL (07-28-20 @ 06:40)  Magnesium, Serum: 2.2 mg/dL (07-27-20 @ 05:33)    Phosphorus Level, Serum: 3.9 mg/dL (07-29-20 @ 06:38)  Phosphorus Level, Serum: 4.2 mg/dL (07-28-20 @ 06:40)  Phosphorus Level, Serum: 3.9 mg/dL (07-27-20 @ 05:33)                                              11.2   10.50 )-----------( 409      ( 29 Jul 2020 06:09 )             38.7                         10.6   10.17 )-----------( 403      ( 28 Jul 2020 06:41 )             36.3                         10.9   10.28 )-----------( 375      ( 27 Jul 2020 05:33 )             37.7       CAPILLARY BLOOD GLUCOSE                Microbiology

## 2020-07-29 NOTE — PROGRESS NOTE ADULT - PROBLEM SELECTOR PLAN 2
- As above  - remodulin dose increased by patient with pump, per Dr. Yoon and Rosie aguero  - dose increased from 18 to 19 ng/kg/min today with plan to increase dose gradually every 3 days as tolerated by patient

## 2020-07-29 NOTE — PROGRESS NOTE ADULT - ASSESSMENT
Pt is a 39 y/o female with PMHx pulmonary HTN w/ Class I/IV features (dx in 2014) on continuous Remodulin infusions c/b RV systolic failure s/p PPM, chronic asthma on Atrovent and 2L nasal cannula at night, chronic PE on Xarelto 10mg, GERD, SVT s/p ablation (5/2020), chronic sinusitis, unspecified keratoconus who p/w worsening SOB of 1 month duration. Pt is being admitted to floors for acute-on-chronic RHF and PAH with plan for careful diuresis and continued monitoring of fluid status, currently hemodynamically stable. Fluid status is now at baseline, plan to d/c following optimization of remodulin dose.

## 2020-07-29 NOTE — PROGRESS NOTE ADULT - PROVIDER SPECIALTY LIST ADULT
Heart Failure
Internal Medicine
Pulmonology
Internal Medicine

## 2020-08-03 ENCOUNTER — OUTPATIENT (OUTPATIENT)
Dept: OUTPATIENT SERVICES | Facility: HOSPITAL | Age: 40
LOS: 1 days | End: 2020-08-03
Payer: MEDICAID

## 2020-08-03 ENCOUNTER — APPOINTMENT (OUTPATIENT)
Dept: MAMMOGRAPHY | Facility: IMAGING CENTER | Age: 40
End: 2020-08-03
Payer: MEDICAID

## 2020-08-03 ENCOUNTER — RESULT REVIEW (OUTPATIENT)
Age: 40
End: 2020-08-03

## 2020-08-03 ENCOUNTER — APPOINTMENT (OUTPATIENT)
Dept: ULTRASOUND IMAGING | Facility: IMAGING CENTER | Age: 40
End: 2020-08-03
Payer: MEDICAID

## 2020-08-03 DIAGNOSIS — Z98.51 TUBAL LIGATION STATUS: Chronic | ICD-10-CM

## 2020-08-03 DIAGNOSIS — N63.20 UNSPECIFIED LUMP IN THE LEFT BREAST, UNSPECIFIED QUADRANT: ICD-10-CM

## 2020-08-03 DIAGNOSIS — Z95.0 PRESENCE OF CARDIAC PACEMAKER: Chronic | ICD-10-CM

## 2020-08-03 PROCEDURE — 77066 DX MAMMO INCL CAD BI: CPT

## 2020-08-03 PROCEDURE — 77062 BREAST TOMOSYNTHESIS BI: CPT | Mod: 26

## 2020-08-03 PROCEDURE — 77066 DX MAMMO INCL CAD BI: CPT | Mod: 26

## 2020-08-03 PROCEDURE — 76641 ULTRASOUND BREAST COMPLETE: CPT | Mod: 26,50

## 2020-08-03 PROCEDURE — 76641 ULTRASOUND BREAST COMPLETE: CPT

## 2020-08-03 PROCEDURE — G0279: CPT

## 2020-08-04 ENCOUNTER — NON-APPOINTMENT (OUTPATIENT)
Age: 40
End: 2020-08-04

## 2020-08-06 ENCOUNTER — RESULT REVIEW (OUTPATIENT)
Age: 40
End: 2020-08-06

## 2020-08-06 ENCOUNTER — APPOINTMENT (OUTPATIENT)
Dept: ULTRASOUND IMAGING | Facility: IMAGING CENTER | Age: 40
End: 2020-08-06
Payer: MEDICAID

## 2020-08-06 ENCOUNTER — OUTPATIENT (OUTPATIENT)
Dept: OUTPATIENT SERVICES | Facility: HOSPITAL | Age: 40
LOS: 1 days | End: 2020-08-06
Payer: MEDICAID

## 2020-08-06 DIAGNOSIS — N63.20 UNSPECIFIED LUMP IN THE LEFT BREAST, UNSPECIFIED QUADRANT: ICD-10-CM

## 2020-08-06 DIAGNOSIS — Z98.51 TUBAL LIGATION STATUS: Chronic | ICD-10-CM

## 2020-08-06 DIAGNOSIS — Z95.0 PRESENCE OF CARDIAC PACEMAKER: Chronic | ICD-10-CM

## 2020-08-06 PROCEDURE — 88305 TISSUE EXAM BY PATHOLOGIST: CPT | Mod: 26

## 2020-08-06 PROCEDURE — 88342 IMHCHEM/IMCYTCHM 1ST ANTB: CPT | Mod: 26,59

## 2020-08-06 PROCEDURE — 88341 IMHCHEM/IMCYTCHM EA ADD ANTB: CPT | Mod: 26,59

## 2020-08-06 PROCEDURE — 19083 BX BREAST 1ST LESION US IMAG: CPT | Mod: LT

## 2020-08-06 PROCEDURE — A4648: CPT

## 2020-08-06 PROCEDURE — 88360 TUMOR IMMUNOHISTOCHEM/MANUAL: CPT | Mod: 26

## 2020-08-06 PROCEDURE — 19083 BX BREAST 1ST LESION US IMAG: CPT

## 2020-08-06 PROCEDURE — 88360 TUMOR IMMUNOHISTOCHEM/MANUAL: CPT

## 2020-08-06 PROCEDURE — 88341 IMHCHEM/IMCYTCHM EA ADD ANTB: CPT

## 2020-08-06 PROCEDURE — 88305 TISSUE EXAM BY PATHOLOGIST: CPT

## 2020-08-07 RX ORDER — ERGOCALCIFEROL 1.25 MG/1
1.25 MG CAPSULE, LIQUID FILLED ORAL
Qty: 4 | Refills: 2 | Status: DISCONTINUED | COMMUNITY
Start: 2020-01-15 | End: 2020-08-07

## 2020-08-07 RX ORDER — AZITHROMYCIN 250 MG/1
250 TABLET, FILM COATED ORAL
Qty: 1 | Refills: 0 | Status: DISCONTINUED | COMMUNITY
Start: 2020-06-09 | End: 2020-08-07

## 2020-08-07 RX ORDER — METOPROLOL SUCCINATE 25 MG/1
25 TABLET, EXTENDED RELEASE ORAL
Qty: 30 | Refills: 3 | Status: DISCONTINUED | COMMUNITY
Start: 2019-12-20 | End: 2020-08-07

## 2020-08-07 RX ORDER — TREPROSTINIL 20 MG/20ML
20 INJECTION, SOLUTION INTRAVENOUS; SUBCUTANEOUS
Refills: 0 | Status: DISCONTINUED | COMMUNITY
Start: 2019-10-29 | End: 2020-08-07

## 2020-08-07 RX ORDER — RIVAROXABAN 10 MG/1
10 TABLET, FILM COATED ORAL
Qty: 30 | Refills: 0 | Status: DISCONTINUED | COMMUNITY
Start: 2019-07-29 | End: 2020-08-07

## 2020-08-07 RX ORDER — HYDROCORTISONE 10 MG/G
1 CREAM TOPICAL
Qty: 1 | Refills: 1 | Status: DISCONTINUED | COMMUNITY
Start: 2020-01-14 | End: 2020-08-07

## 2020-08-10 ENCOUNTER — APPOINTMENT (OUTPATIENT)
Dept: HEART FAILURE | Facility: CLINIC | Age: 40
End: 2020-08-10
Payer: MEDICAID

## 2020-08-10 ENCOUNTER — APPOINTMENT (OUTPATIENT)
Dept: PULMONOLOGY | Facility: CLINIC | Age: 40
End: 2020-08-10
Payer: MEDICAID

## 2020-08-10 VITALS
DIASTOLIC BLOOD PRESSURE: 74 MMHG | BODY MASS INDEX: 31.86 KG/M2 | TEMPERATURE: 98.3 F | SYSTOLIC BLOOD PRESSURE: 105 MMHG | WEIGHT: 203 LBS | HEART RATE: 119 BPM | OXYGEN SATURATION: 93 % | HEIGHT: 67 IN | RESPIRATION RATE: 12 BRPM

## 2020-08-10 DIAGNOSIS — Z71.89 OTHER SPECIFIED COUNSELING: ICD-10-CM

## 2020-08-10 LAB — SURGICAL PATHOLOGY STUDY: SIGNIFICANT CHANGE UP

## 2020-08-10 PROCEDURE — 99214 OFFICE O/P EST MOD 30 MIN: CPT

## 2020-08-10 PROCEDURE — 99215 OFFICE O/P EST HI 40 MIN: CPT | Mod: 95

## 2020-08-10 NOTE — REVIEW OF SYSTEMS
[Orthopnea] : orthopnea [GERD] : gerd [Anemia] : anemia [Fever] : no fever [Chills] : no chills [Dry Eyes] : no dry eyes [Cough] : no cough [Chest Discomfort] : no chest discomfort [Nasal Discharge] : no nasal discharge [Nocturia] : no nocturia [Arthralgias] : no arthralgias [Raynaud] : no raynaud [Headache] : no headache [Depression] : no depression [Diabetes] : no diabetes

## 2020-08-10 NOTE — DISCUSSION/SUMMARY
[FreeTextEntry1] : -----Assessment and Plan--------39 year-old lady with PULMONARY ARTERIAL HYPERTENSION  S/P PPM  FOR TACHYCARDIA AT [place] ON S/Q REMODULIN - S/P LEFT BREAST BIOPSY \par \par 1]   PULM  ART HTN ]   --------- WHO GROUP I FUNCTIONAL CLASS II----[ AS PER  DR FREEMAN  WHO DID PULM ANGIO SHE HAS FEATURES OF GROUP I  WITH SOME EVIDENCE OF DISTAL CLOTS] She is on remodulin   19ng-- ALSO OPSUMIT --- WUse of oxygen medically necessary given severe Pulm Htn, anemia and h/o nocturnal desaturation which could worsen Pulm HTN.\par 2) ---She is  on xarelto---will be on lifelong anticoagulation---.\par 3), -asthma -OFF PREDNSISONE  SLOWLY, use nebs\par 4) S/P PPM- follows EP clinic at [facility] ---NEEDS TO F/U WITH EPS SERVICE AS WELL\par 5) chronic palpitations- seeing Dr Garcia- heart failure clinic\par 6) needs yearly CT chest and PFT\par 7) she has features of ERICA- including sleep disturbance, nocturnal desat, and excessive fatigue-  HST was negative for ERICA in 2014 but had desaturation. Will try to repeat HST.\par 8) has consult /[facility] -----\par 9) Anemia: Will repeat Iron studies. Patient was advised to follow up with Hematology for possible Iron infusion and with GYN to look for Copper or Progesterone contraceptive to decrease frequency of periods and improve anemia.\par \par 10 S/P LEFT BREAST BIOSPY \par 11-   GUM PAIN AND INFECTION ---CLINDAMYCIN\par 10/ f/u in one month\par \par \par \par   Patient at this time  will follow  the above mentioned recommendations --and f/u in DEC 2018----If you have any questions  I can be reached  at #  [phone]. \par \par Vivian Yoon MD, [facility] \par Director, Pulmonary Hypertension Program \par [facility] \par Division of Pulmonary, Critical Care and Sleep Medicine \par  Professor of Medicine \par [facility]\par \par

## 2020-08-10 NOTE — HISTORY OF PRESENT ILLNESS
[FreeTextEntry1] : Ms. Liz Conway is a 40 year old woman who developed shortness of breath approximately 7 years ago. Around six years ago she was diagnosed with pulmonary hypertension. She was found to have some evidence of distal pulmonary emboli and had previously been on therapy with riociquat. She had a prolonged hospitalization in May of 2018, after she developed syncope. She was found to have tachycardia of unclear etiology with associated hypotension requiring vasopressors. She was ultimately transferred to [facility] for transplant evaluation. There she was started on IV treprostinil. Uptitrations of the treprostinil have been modest and she is currently on 18 ng/kg/min. She did not tolerate further increases more recently due to headaches. \par \par In May she underwent SVT ablation. She tells me that since the ablation, her heart does not race as frequently, but when it does she develops left arm pain, which she rates a 7/10, and which last as long as the palpitations do, which is typically on the order of seconds. Her shortness of breath has progressed and she can currently only walk a few steps before having to stop due to dyspnea. She is not dizzy and has had no syncopal events. She notes no abdominal distention or swelling in her legs. She has no cough. She has no PND or orthopnea.

## 2020-08-10 NOTE — HISTORY OF PRESENT ILLNESS
[Medical Office: ([facility])___] : at the medical office located in  [Home] : at home, [unfilled] , at the time of the visit. [Spouse] : spouse [TextBox_4] : ------Patient is here for follow up of her pulmonary htn. ------------------INITIALLY  REFD  WITH   ECHO [DONE  OUTSIDE ] ELEVATED PASP  DILATED RV AND RA---------  HAS BEEN TOLD IN THE PAST SHE HAS  ASTHMA-----long-standing history of dyspnea on exertion-----------------has baseline tachycardia ----.....No history of fever, chills , rigors, chest pain, or hemoptysis. No h/o significant weight loss in last few months. No history of liver dysfunction , collagen vascular disorder or chronic thromboembolic disease. I would classify her dyspnea as WHO  FUNCTIONAL CLASS III-----------no calf tenderness----------SLEEP STUDY SHOWS AHI 0.6 but T 90  < 35 %-----DIAGNOSED  AS PAH ---------WAS ON ADMAPAS  NOW ON   SQ REMODULIN  [ SINCE MAY 2108]  ---S/P PACEMAKER PLACEMENT AT [facility]  AND ON METOPROLOL------\par \par ------cardiac CATH ---- 28/ july / 2014.......PAP -76 , Aortic pressure -84, left ventricle edp 10 MMHG , rt atrium - 13, PAP after NO - 42--- CO 3.19,   PVR 1928.78 ( dsc) 24.12 ( woods) PVR after NO ( 692.51( dsc), 8.66 ( enrique)\par \par CARDIAC  CATH 2108-----\par --CTA CHEST---- july 23/2014--- no evidence of PE. non specific ground glass opacities throughout the lungs....moderate pericardial effusion ---\par --repeat CT chest WITHOUT CONTRAST  12/2014 MOSAIC PATTERN UNCHANGED  FROM BEFORE\par vq scan 6/2015 abnormal- low prob PE\par cxr-6/2015 no acute disease \par ---------CT CHEST-----1. Enlarged main pulmonary artery compatible with pulmonary \par hypertension. -2. Patchy bilateral groundglass are unchanged since 06/20/2015 and are of \par uncertain origin. ------       \par \par ECHO ....July 24/ 2014----right ventricular enlargement with decrease ventricular systolic function...estimated rt ventricle systolic pre equals 113mmhg, ---\par \par ECHO 2018----  EF80, [id number]\par \par SEPT 2104  6MWT- WALKED 301 METERS-------PSG   2014   LOW  T90 BUT NO ERICA\par \par \par \par  june 2015---SAW DR FREEMAN  AT [facility]  -cath showed   elevated  pressures but  pa ANGIOgram   should only distal clts  not a candidate for  surgery for CTEPH---RA 4 mmhg, RV 46/5, PA 49/24 mean PA 33, PCWP 13   WITH SUGGESTION OF VASODILATOR  RESPONSE  [ D/W DR LUONG]------AS PER HEATH FREEMAN  SHE HAS GROUP I PAH  WITH DISTAL CLOTS S RECOMMENDED   MEDICAL TREATMENT---PT ON  PROCARDIA  AND  ADMAPAS---DID NOT TOLERATE OPSUMIT IN PAST [[-[ DUE TO LOW BP] --------- -------------------------------.\par \par ALSO ON ANTICOAGULATION-follows coumadin clinic  ------- elevated INR today DENIES BLEEDING/HEMATURIA/MELENA\par \par She reports noncompliance to adempas since 8/1/15 has  basla tachycardia f/u with DR KRISHNAMURTHY  cardilogy ---\par \par ---PFT AUG 2016------FVC 2.44, FEV 1 1.61,   TLC 4.37, mild to moderate obstructive lung defect\par ----jan 2017 6MWD 250 m---\par ---2019  6MWT    ---165 METERS\par \par --------ECHO AUG 2016-----EF 82%-----PASP 26 mmHg\par --CATH 2014 ----MEAN PA 77, LVEDP 10, CO 3.14\par \par ------CT SCAN AUG 2016-------patchy bilateral groundglass unchanged since 6/20/2015-----enlarged PA 3.5 cm-----\par CT SCAN  2018--NO PE, PA 4.0 CM,MOSAIC ATTENUATION\par \par july 2018 s/p hospitalization for syncopal episode at home. She was transferred to [facility] where  a pacemaker was inserted d/t tachycardia and syncope. She was also subsequently started on remodulin SQ by Dr Chow--current rate 12ng. She is still have dyspnea.\par \par ------OCT 2018--------s/p hospitalization at [facility]------S/P PACEMAKER PLACEMENT AT [facility]------\par \par november 2019 here for acute visit- PAH w/remodulin @16ng infusing subcutaneously\par she is c/o chronic  palpitations, slightly improved.\par comes in today for cough and wheezing- started zpack and prednisone 2 days ago\par \par \par jan 17 2020- PAH- remodulin @16ng- SQ site - has stopped draining, less painful - completed doxy last week and is now on clindamycin ---\par She is also c/o asthma exacerbation- started prednisone 10mg and using nebs- symptoms somewhat improved from a few days ago\par \par Feb 25, 2020  PAH on Xarelto and Remodulin still at 16ng SQ site. New site looks good without infection. Old site has completely healed. Patient still having intermittent jaw claudication. She has more tachycardia with periods. She has Iron deficiency anemia and has stopped taking her Iron due to constipation. she otherwise has good exercise tolerance. Has oxygen at home and is using it.\par \par JULY 2020-----S/P    LEFT BREAST BIOPSY  -------    PAH ON XARELTO   , REMODULIN 19 NG/KG/MIN ----ON OPSUMIT AND  DIURETICS --  HAS GUM PAIN /

## 2020-08-10 NOTE — PHYSICAL EXAM
[General Appearance - Well Nourished] : well nourished [General Appearance - Well Developed] : well developed [Normal Oral Mucosa] : normal oral mucosa [Normal Conjunctiva] : the conjunctiva exhibited no abnormalities [Respiration, Rhythm And Depth] : normal respiratory rhythm and effort [Auscultation Breath Sounds / Voice Sounds] : lungs were clear to auscultation bilaterally [Heart Sounds] : normal S1 and S2 [Heart Rate And Rhythm] : heart rate and rhythm were normal [Edema] : no peripheral edema present [Arterial Pulses Normal] : the arterial pulses were normal [Bowel Sounds] : normal bowel sounds [Abdomen Soft] : soft [Nail Clubbing] : no clubbing of the fingernails [Abnormal Walk] : normal gait [Cyanosis, Localized] : no localized cyanosis [Skin Color & Pigmentation] : normal skin color and pigmentation [Skin Turgor] : normal skin turgor [Oriented To Time, Place, And Person] : oriented to person, place, and time [] : no rash [Impaired Insight] : insight and judgment were intact [No Anxiety] : not feeling anxious [FreeTextEntry1] : Normal JVP without HJR.

## 2020-08-10 NOTE — DISCUSSION/SUMMARY
[FreeTextEntry1] : - I would continue to increase the IV treprostinil barring development of side effects. She is still on a relatively low dose of therapy. It is reassuring that her symptoms have appeared to improve with therapy up to this point. If she develops worsening tachycardia or development of adverse symptoms related to uptitration, I would add either an ERA or a PDE5I. I have encouraged her to follow-up with the [facility] transplant team give her disease progression. \par - I will discuss my suggestions with Dr. Yoon.\par - Follow-up with me in two months.

## 2020-08-10 NOTE — ASSESSMENT
[FreeTextEntry1] : Ms. Conway is a 40 year old woman with pulmonary arterial hypertension, WHO group I, with chronic right ventricular systolic heart failure. She currently is euvolemic and normotensive. She has poor functional capacity and is NYHA class III. She is currently on IV treprostinil at 18 ng/kg/min. She has had some improvement in symptoms following her SVT ablation in May, but she remains very limited. While her REVEAL risk score is only 6, consistent with a predicated 1-year survival of >95%, she has concerning risk factors of persistent tachycardia, exercise intolerance, and RV enlargement on echocardiogram.

## 2020-08-13 ENCOUNTER — NON-APPOINTMENT (OUTPATIENT)
Age: 40
End: 2020-08-13

## 2020-08-18 ENCOUNTER — APPOINTMENT (OUTPATIENT)
Dept: PULMONOLOGY | Facility: CLINIC | Age: 40
End: 2020-08-18
Payer: MEDICAID

## 2020-08-18 PROCEDURE — 99215 OFFICE O/P EST HI 40 MIN: CPT

## 2020-08-19 NOTE — HISTORY OF PRESENT ILLNESS
[TextBox_4] : ------Patient is here for follow up of her pulmonary htn. ------------------INITIALLY  REFD  WITH   ECHO [DONE  OUTSIDE ] ELEVATED PASP  DILATED RV AND RA---------  HAS BEEN TOLD IN THE PAST SHE HAS  ASTHMA-----long-standing history of dyspnea on exertion-----------------has baseline tachycardia ----.....No history of fever, chills , rigors, chest pain, or hemoptysis. No h/o significant weight loss in last few months. No history of liver dysfunction , collagen vascular disorder or chronic thromboembolic disease. I would classify her dyspnea as WHO  FUNCTIONAL CLASS III-----------no calf tenderness----------SLEEP STUDY SHOWS AHI 0.6 but T 90  < 35 %-----DIAGNOSED  AS PAH ---------WAS ON ADMAPAS  NOW ON   SQ REMODULIN  [ SINCE MAY 2108]  ---S/P PACEMAKER PLACEMENT AT [facility]  AND ON METOPROLOL------\par \par ------cardiac CATH ---- 28/ july / 2014.......PAP -76 , Aortic pressure -84, left ventricle edp 10 MMHG , rt atrium - 13, PAP after NO - 42--- CO 3.19,   PVR 1928.78 ( dsc) 24.12 ( woods) PVR after NO ( 692.51( dsc), 8.66 ( enrique)\par \par CARDIAC  CATH 2108-----\par --CTA CHEST---- july 23/2014--- no evidence of PE. non specific ground glass opacities throughout the lungs....moderate pericardial effusion ---\par --repeat CT chest WITHOUT CONTRAST  12/2014 MOSAIC PATTERN UNCHANGED  FROM BEFORE\par vq scan 6/2015 abnormal- low prob PE\par cxr-6/2015 no acute disease \par ---------CT CHEST-----1. Enlarged main pulmonary artery compatible with pulmonary \par hypertension. -2. Patchy bilateral groundglass are unchanged since 06/20/2015 and are of \par uncertain origin. ------       \par \par ECHO ....July 24/ 2014----right ventricular enlargement with decrease ventricular systolic function...estimated rt ventricle systolic pre equals 113mmhg, ---\par \par ECHO 2018----  EF80, [id number]\par \par SEPT 2104  6MWT- WALKED 301 METERS-------PSG   2014   LOW  T90 BUT NO ERICA\par \par \par \par  june 2015---SAW DR FREEMAN  AT [facility]  -cath showed   elevated  pressures but  pa ANGIOgram   should only distal clts  not a candidate for  surgery for CTEPH---RA 4 mmhg, RV 46/5, PA 49/24 mean PA 33, PCWP 13   WITH SUGGESTION OF VASODILATOR  RESPONSE  [ D/W DR LUONG]------AS PER HEATH FREEMAN  SHE HAS GROUP I PAH  WITH DISTAL CLOTS S RECOMMENDED   MEDICAL TREATMENT---PT ON  PROCARDIA  AND  ADMAPAS---DID NOT TOLERATE OPSUMIT IN PAST [[-[ DUE TO LOW BP] --------- -------------------------------.\par \par ALSO ON ANTICOAGULATION-follows coumadin clinic  ------- elevated INR today DENIES BLEEDING/HEMATURIA/MELENA\par \par She reports noncompliance to adempas since 8/1/15 has  basla tachycardia f/u with DR KRISHNAMURTHY  cardilogy ---\par \par ---PFT AUG 2016------FVC 2.44, FEV 1 1.61,   TLC 4.37, mild to moderate obstructive lung defect\par ----jan 2017 6MWD 250 m---\par ---2019  6MWT    ---165 METERS\par \par --------ECHO AUG 2016-----EF 82%-----PASP 26 mmHg\par --CATH 2014 ----MEAN PA 77, LVEDP 10, CO 3.14\par \par ------CT SCAN AUG 2016-------patchy bilateral groundglass unchanged since 6/20/2015-----enlarged PA 3.5 cm-----\par CT SCAN  2018--NO PE, PA 4.0 CM,MOSAIC ATTENUATION\par \par july 2018 s/p hospitalization for syncopal episode at home. She was transferred to [facility] where  a pacemaker was inserted d/t tachycardia and syncope. She was also subsequently started on remodulin SQ by Dr Chow--current rate 12ng. She is still have dyspnea.\par \par ------OCT 2018--------s/p hospitalization at [facility]------S/P PACEMAKER PLACEMENT AT [facility]------\par \par november 2019 here for acute visit- PAH w/remodulin @16ng infusing subcutaneously\par she is c/o chronic  palpitations, slightly improved.\par comes in today for cough and wheezing- started zpack and prednisone 2 days ago\par \par \par jan 17 2020- PAH- remodulin @16ng- SQ site - has stopped draining, less painful - completed doxy last week and is now on clindamycin ---\par She is also c/o asthma exacerbation- started prednisone 10mg and using nebs- symptoms somewhat improved from a few days ago\par \par Feb 25, 2020  PAH on Xarelto and Remodulin still at 16ng SQ site. New site looks good without infection. Old site has completely healed. Patient still having intermittent jaw claudication. She has more tachycardia with periods. She has Iron deficiency anemia and has stopped taking her Iron due to constipation. she otherwise has good exercise tolerance. Has oxygen at home and is using it.\par \par JULY 2020-----S/P    LEFT BREAST BIOPSY  -------    PAH ON XARELTO   , REMODULIN 19 NG/KG/MIN ----ON OPSUMIT AND  DIURETICS --  HAS GUM PAIN / \par \par aug 2020 pulm htn on remodulin 19ng no jaw pain, no diarrhea- site clean, kevin\par no further facial swelling - broken tooth right upper gum\par She is c/o worsening DOE_ uses oxygen only at night- off diuretics\par breast mass- s/p biopsy- negative for malignancy

## 2020-08-19 NOTE — DISCUSSION/SUMMARY
[FreeTextEntry1] : -----Assessment and Plan--------39 year-old lady with PULMONARY ARTERIAL HYPERTENSION  S/P PPM  FOR TACHYCARDIA AT [facility] ON S/Q REMODULIN - S/P LEFT BREAST BIOPSY \par \par 1]   PULM  ART HTN     --------- WHO GROUP I FUNCTIONAL CLASS II----[ AS PER  DR FREEMAN  WHO DID PULM ANGIO SHE HAS FEATURES OF GROUP I  WITH SOME EVIDENCE OF DISTAL CLOTS] She is on remodulin   19ng-Increase 20ng - ALSO OPSUMIT --- Restart sildenafil 20mg once daily x 1 week then bid  \par 2) of oxygen medically necessary given severe Pulm Htn, anemia and h/o nocturnal desaturation which could worsen Pulm HTN. ADvised oxygen 2 lpm x 24hrs\par 3) she was advised to resume lasix 20mg every other day\par 4) ) -She is  on xarelto---will be on lifelong anticoagulation---.\par 5) , -asthma -use nebs\par 6) S/P PPM- follows EP clinic at [facility] ---NEEDS TO F/U WITH EPS SERVICE AS WELL\par 7) chronic palpitations- seeing Dr Garcia- heart failure clinic\par 8)  she has features of ERICA- including sleep disturbance, nocturnal desat, and excessive fatigue-  HST was negative for ERICA in 2014 but had desaturation. Will try to repeat HST.\par 9)  has consult /[facility] -----\par 10) Anemia: Will repeat Iron studies. Patient was advised to follow up with Hematology for possible Iron infusion \par 11) breast mass- biopsy negative for malignancy- year;y mammo advised\par 12) will order home blood draw as we were unable to obtain labs in office d/t access\par 13) needs f/u with dentist\par 14) f/u in office in 2 weeks\par 10 S/P LEFT BREAST BIOSPY \par 11-   GUM PAIN AND INFECTION ---CLINDAMYCIN\par 10/ f/u in one month\par \par \par \par   Patient at this time  will follow  the above mentioned recommendations --and f/u in DEC 2018----If you have any questions  I can be reached  at #  [phone]. \par \par Vivian Yoon MD, FCCP \par Director, Pulmonary Hypertension Program \par [facility] \par Division of Pulmonary, Critical Care and Sleep Medicine \par  Professor of Medicine \par [employer]\par \par

## 2020-08-19 NOTE — PHYSICAL EXAM
[No Acute Distress] : no acute distress [Normal Oropharynx] : normal oropharynx [Normal Appearance] : normal appearance [No Neck Mass] : no neck mass [No Murmurs] : no murmurs [No Resp Distress] : no resp distress [Clear to Auscultation Bilaterally] : clear to auscultation bilaterally [No Abnormalities] : no abnormalities [Benign] : benign [Normal Gait] : normal gait [No Edema] : no edema [No Clubbing] : no clubbing [No Cyanosis] : no cyanosis [Oriented x3] : oriented x3 [Normal Color/ Pigmentation] : normal color/ pigmentation [FROM] : FROM [No Focal Deficits] : no focal deficits [Normal Affect] : normal affect [TextBox_54] : tachycardia

## 2020-08-19 NOTE — REVIEW OF SYSTEMS
[Orthopnea] : orthopnea [GERD] : gerd [Anemia] : anemia [Fever] : no fever [Cough] : no cough [Dry Eyes] : no dry eyes [Chills] : no chills [Nasal Discharge] : no nasal discharge [Chest Discomfort] : no chest discomfort [Nocturia] : no nocturia [Raynaud] : no raynaud [Arthralgias] : no arthralgias [Headache] : no headache [Depression] : no depression [Diabetes] : no diabetes

## 2020-08-26 ENCOUNTER — LABORATORY RESULT (OUTPATIENT)
Age: 40
End: 2020-08-26

## 2020-08-27 LAB
ALBUMIN SERPL ELPH-MCNC: 4.3 G/DL
ALP BLD-CCNC: 59 U/L
ALT SERPL-CCNC: 8 U/L
ANION GAP SERPL CALC-SCNC: 14 MMOL/L
AST SERPL-CCNC: 11 U/L
BASOPHILS # BLD AUTO: 0.09 K/UL
BASOPHILS NFR BLD AUTO: 0.8 %
BILIRUB SERPL-MCNC: 0.7 MG/DL
BUN SERPL-MCNC: 9 MG/DL
CALCIUM SERPL-MCNC: 8.8 MG/DL
CHLORIDE SERPL-SCNC: 105 MMOL/L
CO2 SERPL-SCNC: 18 MMOL/L
CREAT SERPL-MCNC: 1.04 MG/DL
EOSINOPHIL # BLD AUTO: 0.24 K/UL
EOSINOPHIL NFR BLD AUTO: 2 %
GLUCOSE SERPL-MCNC: 85 MG/DL
HCT VFR BLD CALC: 38.3 %
HGB BLD-MCNC: 11 G/DL
IMM GRANULOCYTES NFR BLD AUTO: 0.4 %
LYMPHOCYTES # BLD AUTO: 2.45 K/UL
LYMPHOCYTES NFR BLD AUTO: 20.8 %
MAN DIFF?: NORMAL
MCHC RBC-ENTMCNC: 17.9 PG
MCHC RBC-ENTMCNC: 28.7 GM/DL
MCV RBC AUTO: 62.2 FL
MONOCYTES # BLD AUTO: 0.93 K/UL
MONOCYTES NFR BLD AUTO: 7.9 %
NEUTROPHILS # BLD AUTO: 8.01 K/UL
NEUTROPHILS NFR BLD AUTO: 68.1 %
PLATELET # BLD AUTO: 507 K/UL
POTASSIUM SERPL-SCNC: 4 MMOL/L
PROT SERPL-MCNC: 6.6 G/DL
RBC # BLD: 6.16 M/UL
RBC # FLD: 20.9 %
SARS-COV-2 IGG SERPL IA-ACNC: 0.19 INDEX
SARS-COV-2 IGG SERPL QL IA: NEGATIVE
SODIUM SERPL-SCNC: 138 MMOL/L
WBC # FLD AUTO: 11.77 K/UL

## 2020-09-01 ENCOUNTER — APPOINTMENT (OUTPATIENT)
Dept: PULMONOLOGY | Facility: CLINIC | Age: 40
End: 2020-09-01
Payer: MEDICAID

## 2020-09-01 VITALS
DIASTOLIC BLOOD PRESSURE: 81 MMHG | BODY MASS INDEX: 30.76 KG/M2 | TEMPERATURE: 98.7 F | HEART RATE: 121 BPM | HEIGHT: 67 IN | WEIGHT: 196 LBS | SYSTOLIC BLOOD PRESSURE: 114 MMHG | RESPIRATION RATE: 20 BRPM

## 2020-09-01 PROCEDURE — 99215 OFFICE O/P EST HI 40 MIN: CPT | Mod: 25

## 2020-09-01 PROCEDURE — 96372 THER/PROPH/DIAG INJ SC/IM: CPT

## 2020-09-01 NOTE — DISCUSSION/SUMMARY
[FreeTextEntry1] : -----Assessment and Plan--------39 year-old lady with PULMONARY ARTERIAL HYPERTENSION  S/P PPM  FOR TACHYCARDIA AT [place] ON S/Q REMODULIN - S/P LEFT BREAST BIOPSY \par \par 1]   PULM  ART HTN     --------- WHO GROUP I FUNCTIONAL CLASS II----[ AS PER  DR FREEMAN  WHO DID PULM ANGIO SHE HAS FEATURES OF GROUP I  WITH SOME EVIDENCE OF DISTAL CLOTS] She is on remodulin   20 ng-Increase 21ng - ALSO OPSUMIT --- Restart sildenafil 20mg once daily x 1 week then bid  \par 2) of oxygen medically necessary given severe Pulm Htn, anemia and h/o nocturnal desaturation which could worsen Pulm HTN. ADvised oxygen 2 lpm x 24hrs\par 3) she was advised to resume lasix 20mg every other day\par 4) ) -She is  on xarelto---will be on lifelong anticoagulation---.\par 5) , -asthma -use nebs , PREDNISOSNE, I/M STEROIDS GIVEN TODAY -Z APCK PRESCRIBED\par 6) S/P PPM- follows EP clinic at [facility] ---NEEDS TO F/U WITH EPS SERVICE AS WELL\par 7) chronic palpitations- seeing Dr Garcia- heart failure clinic\par 8)  she has features of ERICA- including sleep disturbance, nocturnal desat, and excessive fatigue-  HST was negative for ERICA in 2014 but had desaturation. Will try to repeat HST.\par 9)  has consult /[facility] Team -----\par 10) Anemia: Will repeat Iron studies. Patient was advised to follow up with Hematology for possible Iron infusion \par 11) breast mass- biopsy negative for malignancy- year;y mammo advised\par 12) will order home blood draw as we were unable to obtain labs in office d/t access\par 13) needs f/u with dentist\par 14) f/u in office in 2 weeks\par 10 S/P LEFT BREAST BIOSPY \par 11-   GUM PAIN AND INFECTION ---CLINDAMYCIN\par 10/ f/u in one month\par \par \par \par   Patient at this time  will follow  the above mentioned recommendations --and f/u in DEC 2018----If you have any questions  I can be reached  at #  [phone]. \par \par Vivian Yoon MD, FCCP \par Director, Pulmonary Hypertension Program \par [facility] \par Division of Pulmonary, Critical Care and Sleep Medicine \par  Professor of Medicine \par [employer]\par \par

## 2020-09-01 NOTE — PHYSICAL EXAM
[No Acute Distress] : no acute distress [Normal Oropharynx] : normal oropharynx [Normal Appearance] : normal appearance [No Neck Mass] : no neck mass [No Murmurs] : no murmurs [No Resp Distress] : no resp distress [Clear to Auscultation Bilaterally] : clear to auscultation bilaterally [No Abnormalities] : no abnormalities [Benign] : benign [Normal Gait] : normal gait [No Clubbing] : no clubbing [No Cyanosis] : no cyanosis [No Edema] : no edema [FROM] : FROM [Normal Color/ Pigmentation] : normal color/ pigmentation [No Focal Deficits] : no focal deficits [Oriented x3] : oriented x3 [Normal Affect] : normal affect [TextBox_54] : tachycardia

## 2020-09-01 NOTE — HISTORY OF PRESENT ILLNESS
[TextBox_4] : ------Patient is here for follow up of her pulmonary htn. ------------------INITIALLY  REFD  WITH   ECHO [DONE  OUTSIDE ] ELEVATED PASP  DILATED RV AND RA---------  HAS BEEN TOLD IN THE PAST SHE HAS  ASTHMA-----long-standing history of dyspnea on exertion-----------------has baseline tachycardia ----.....No history of fever, chills , rigors, chest pain, or hemoptysis. No h/o significant weight loss in last few months. No history of liver dysfunction , collagen vascular disorder or chronic thromboembolic disease. I would classify her dyspnea as WHO  FUNCTIONAL CLASS III-----------no calf tenderness----------SLEEP STUDY SHOWS AHI 0.6 but T 90  < 35 %-----DIAGNOSED  AS PAH ---------WAS ON ADMAPAS  NOW ON   SQ REMODULIN  [ SINCE MAY 2108]  ---S/P PACEMAKER PLACEMENT AT [facility]  AND ON METOPROLOL------\par \par ------cardiac CATH ---- 28/ july / 2014.......PAP -76 , Aortic pressure -84, left ventricle edp 10 MMHG , rt atrium - 13, PAP after NO - 42--- CO 3.19,   PVR 1928.78 ( dsc) 24.12 ( woods) PVR after NO ( 692.51( dsc), 8.66 ( enrique)\par \par CARDIAC  CATH 2108-----\par --CTA CHEST---- july 23/2014--- no evidence of PE. non specific ground glass opacities throughout the lungs....moderate pericardial effusion ---\par --repeat CT chest WITHOUT CONTRAST  12/2014 MOSAIC PATTERN UNCHANGED  FROM BEFORE\par vq scan 6/2015 abnormal- low prob PE\par cxr-6/2015 no acute disease \par ---------CT CHEST-----1. Enlarged main pulmonary artery compatible with pulmonary \par hypertension. -2. Patchy bilateral groundglass are unchanged since 06/20/2015 and are of \par uncertain origin. ------       \par \par ECHO ....July 24/ 2014----right ventricular enlargement with decrease ventricular systolic function...estimated rt ventricle systolic pre equals 113mmhg, ---\par \par ECHO 2018----  EF80, [id number]\par \par SEPT 2104  6MWT- WALKED 301 METERS-------PSG   2014   LOW  T90 BUT NO ERICA\par \par \par \par  june 2015---SAW DR FREEMAN  AT [facility]  -cath showed   elevated  pressures but  pa ANGIOgram   should only distal clts  not a candidate for  surgery for CTEPH---RA 4 mmhg, RV 46/5, PA 49/24 mean PA 33, PCWP 13   WITH SUGGESTION OF VASODILATOR  RESPONSE  [ D/W DR LUONG]------AS PER HEATH FREEMAN  SHE HAS GROUP I PAH  WITH DISTAL CLOTS S RECOMMENDED   MEDICAL TREATMENT---PT ON  PROCARDIA  AND  ADMAPAS---DID NOT TOLERATE OPSUMIT IN PAST [[-[ DUE TO LOW BP] --------- -------------------------------.\par \par ALSO ON ANTICOAGULATION-follows coumadin clinic  ------- elevated INR today DENIES BLEEDING/HEMATURIA/MELENA\par \par She reports noncompliance to adempas since 8/1/15 has  basla tachycardia f/u with DR KRISHNAMURTHY  cardilogy ---\par \par ---PFT AUG 2016------FVC 2.44, FEV 1 1.61,   TLC 4.37, mild to moderate obstructive lung defect\par ----jan 2017 6MWD 250 m---\par ---2019  6MWT    ---165 METERS\par \par --------ECHO AUG 2016-----EF 82%-----PASP 26 mmHg\par --CATH 2014 ----MEAN PA 77, LVEDP 10, CO 3.14\par \par ------CT SCAN AUG 2016-------patchy bilateral groundglass unchanged since 6/20/2015-----enlarged PA 3.5 cm-----\par CT SCAN  2018--NO PE, PA 4.0 CM,MOSAIC ATTENUATION\par \par july 2018 s/p hospitalization for syncopal episode at home. She was transferred to [facility] where  a pacemaker was inserted d/t tachycardia and syncope. She was also subsequently started on remodulin SQ by Dr Chow--current rate 12ng. She is still have dyspnea.\par \par ------OCT 2018--------s/p hospitalization at [facility]------S/P PACEMAKER PLACEMENT AT [facility]------\par \par november 2019 here for acute visit- PAH w/remodulin @16ng infusing subcutaneously\par she is c/o chronic  palpitations, slightly improved.\par comes in today for cough and wheezing- started zpack and prednisone 2 days ago\par \par \par jan 17 2020- PAH- remodulin @16ng- SQ site - has stopped draining, less painful - completed doxy last week and is now on clindamycin ---\par She is also c/o asthma exacerbation- started prednisone 10mg and using nebs- symptoms somewhat improved from a few days ago\par \par Feb 25, 2020  PAH on Xarelto and Remodulin still at 16ng SQ site. New site looks good without infection. Old site has completely healed. Patient still having intermittent jaw claudication. She has more tachycardia with periods. She has Iron deficiency anemia and has stopped taking her Iron due to constipation. she otherwise has good exercise tolerance. Has oxygen at home and is using it.\par \par JULY 2020-----S/P    LEFT BREAST BIOPSY  -------    PAH ON XARELTO   , REMODULIN 19 NG/KG/MIN ----ON OPSUMIT AND  DIURETICS --  HAS GUM PAIN / \par \par aug 2020 pulm htn on remodulin 19ng no jaw pain, no diarrhea- site clean, kevin\par no further facial swelling - broken tooth right upper gum\par She is c/o worsening DOE_ uses oxygen only at night- off diuretics\par breast mass- s/p biopsy- negative for malignancy\par SEPT 2020----- INCREASE REMODULIN TO 21 NG/KG/MIN, NON COMPLIANT TO OXYGEN   ON XARELTO AND DIURETICS----[facility] APPOINTMENT   SEPT 21

## 2020-09-15 ENCOUNTER — APPOINTMENT (OUTPATIENT)
Dept: PULMONOLOGY | Facility: CLINIC | Age: 40
End: 2020-09-15
Payer: MEDICAID

## 2020-09-15 VITALS
HEART RATE: 122 BPM | WEIGHT: 202 LBS | BODY MASS INDEX: 31.71 KG/M2 | OXYGEN SATURATION: 98 % | TEMPERATURE: 98.8 F | SYSTOLIC BLOOD PRESSURE: 112 MMHG | DIASTOLIC BLOOD PRESSURE: 83 MMHG | HEIGHT: 67 IN | RESPIRATION RATE: 15 BRPM

## 2020-09-15 DIAGNOSIS — Z23 ENCOUNTER FOR IMMUNIZATION: ICD-10-CM

## 2020-09-15 PROCEDURE — G0008: CPT

## 2020-09-15 PROCEDURE — 99215 OFFICE O/P EST HI 40 MIN: CPT | Mod: 25

## 2020-09-15 PROCEDURE — 90686 IIV4 VACC NO PRSV 0.5 ML IM: CPT

## 2020-09-15 NOTE — REVIEW OF SYSTEMS
[Orthopnea] : orthopnea [GERD] : gerd [Anemia] : anemia [Fever] : no fever [Cough] : no cough [Dry Eyes] : no dry eyes [Chills] : no chills [Nasal Discharge] : no nasal discharge [Chest Discomfort] : no chest discomfort [Raynaud] : no raynaud [Nocturia] : no nocturia [Arthralgias] : no arthralgias [Headache] : no headache [Depression] : no depression [Diabetes] : no diabetes

## 2020-09-15 NOTE — PHYSICAL EXAM
[No Acute Distress] : no acute distress [Normal Oropharynx] : normal oropharynx [No Neck Mass] : no neck mass [Normal Appearance] : normal appearance [No Murmurs] : no murmurs [No Resp Distress] : no resp distress [No Abnormalities] : no abnormalities [Benign] : benign [Clear to Auscultation Bilaterally] : clear to auscultation bilaterally [Normal Gait] : normal gait [No Clubbing] : no clubbing [No Cyanosis] : no cyanosis [No Edema] : no edema [FROM] : FROM [Normal Color/ Pigmentation] : normal color/ pigmentation [Oriented x3] : oriented x3 [No Focal Deficits] : no focal deficits [Normal Affect] : normal affect [TextBox_54] : tachycardia

## 2020-09-15 NOTE — HISTORY OF PRESENT ILLNESS
[TextBox_4] : ------Patient is here for follow up of her pulmonary htn. ------------------INITIALLY  REFD  WITH   ECHO [DONE  OUTSIDE ] ELEVATED PASP  DILATED RV AND RA---------  HAS BEEN TOLD IN THE PAST SHE HAS  ASTHMA-----long-standing history of dyspnea on exertion-----------------has baseline tachycardia ----.....No history of fever, chills , rigors, chest pain, or hemoptysis. No h/o significant weight loss in last few months. No history of liver dysfunction , collagen vascular disorder or chronic thromboembolic disease. I would classify her dyspnea as WHO  FUNCTIONAL CLASS III-----------no calf tenderness----------SLEEP STUDY SHOWS AHI 0.6 but T 90  < 35 %-----DIAGNOSED  AS PAH ---------WAS ON ADMAPAS  NOW ON   SQ REMODULIN  [ SINCE MAY 2108]  ---S/P PACEMAKER PLACEMENT AT [place]  AND ON METOPROLOL------\par \par ------cardiac CATH ---- 28/ july / 2014.......PAP -76 , Aortic pressure -84, left ventricle edp 10 MMHG , rt atrium - 13, PAP after NO - 42--- CO 3.19,   PVR 1928.78 ( dsc) 24.12 ( woods) PVR after NO ( 692.51( dsc), 8.66 ( enrique)\par \par CARDIAC  CATH 2108-----\par --CTA CHEST---- july 23/2014--- no evidence of PE. non specific ground glass opacities throughout the lungs....moderate pericardial effusion ---\par --repeat CT chest WITHOUT CONTRAST  12/2014 MOSAIC PATTERN UNCHANGED  FROM BEFORE\par vq scan 6/2015 abnormal- low prob PE\par cxr-6/2015 no acute disease \par ---------CT CHEST-----1. Enlarged main pulmonary artery compatible with pulmonary \par hypertension. -2. Patchy bilateral groundglass are unchanged since 06/20/2015 and are of \par uncertain origin. ------       \par \par ECHO ....July 24/ 2014----right ventricular enlargement with decrease ventricular systolic function...estimated rt ventricle systolic pre equals 113mmhg, ---\par \par ECHO 2018----  EF80, [id number]\par \par SEPT 2104  6MWT- WALKED 301 METERS-------PSG   2014   LOW  T90 BUT NO ERICA\par \par \par \par  june 2015---SAW DR FREEMAN  AT [place]  -cath showed   elevated  pressures but  pa ANGIOgram   should only distal clts  not a candidate for  surgery for CTEPH---RA 4 mmhg, RV 46/5, PA 49/24 mean PA 33, PCWP 13   WITH SUGGESTION OF VASODILATOR  RESPONSE  [ D/W DR LUONG]------AS PER HEATH FREEMAN  SHE HAS GROUP I PAH  WITH DISTAL CLOTS S RECOMMENDED   MEDICAL TREATMENT---PT ON  PROCARDIA  AND  ADMAPAS---DID NOT TOLERATE OPSUMIT IN PAST [[-[ DUE TO LOW BP] --------- -------------------------------.\par \par ALSO ON ANTICOAGULATION-follows coumadin clinic  ------- elevated INR today DENIES BLEEDING/HEMATURIA/MELENA\par \par She reports noncompliance to adempas since 8/1/15 has  basla tachycardia f/u with DR KRISHNAMURTHY  cardilogy ---\par \par ---PFT AUG 2016------FVC 2.44, FEV 1 1.61,   TLC 4.37, mild to moderate obstructive lung defect\par ----jan 2017 6MWD 250 m---\par ---2019  6MWT    ---165 METERS\par \par --------ECHO AUG 2016-----EF 82%-----PASP 26 mmHg\par --CATH 2014 ----MEAN PA 77, LVEDP 10, CO 3.14\par \par ------CT SCAN AUG 2016-------patchy bilateral groundglass unchanged since 6/20/2015-----enlarged PA 3.5 cm-----\par CT SCAN  2018--NO PE, PA 4.0 CM,MOSAIC ATTENUATION\par \par july 2018 s/p hospitalization for syncopal episode at home. She was transferred to [facility] where  a pacemaker was inserted d/t tachycardia and syncope. She was also subsequently started on remodulin SQ by Dr Chow--current rate 12ng. She is still have dyspnea.\par \par ------OCT 2018--------s/p hospitalization at [facility]------S/P PACEMAKER PLACEMENT AT [place]------\par \par november 2019 here for acute visit- PAH w/remodulin @16ng infusing subcutaneously\par she is c/o chronic  palpitations, slightly improved.\par comes in today for cough and wheezing- started zpack and prednisone 2 days ago\par \par \par jan 17 2020- PAH- remodulin @16ng- SQ site - has stopped draining, less painful - completed doxy last week and is now on clindamycin ---\par She is also c/o asthma exacerbation- started prednisone 10mg and using nebs- symptoms somewhat improved from a few days ago\par \par Feb 25, 2020  PAH on Xarelto and Remodulin still at 16ng SQ site. New site looks good without infection. Old site has completely healed. Patient still having intermittent jaw claudication. She has more tachycardia with periods. She has Iron deficiency anemia and has stopped taking her Iron due to constipation. she otherwise has good exercise tolerance. Has oxygen at home and is using it.\par \par JULY 2020-----S/P    LEFT BREAST BIOPSY  -------    PAH ON XARELTO   , REMODULIN 19 NG/KG/MIN ----ON OPSUMIT AND  DIURETICS --  HAS GUM PAIN / \par \par aug 2020 pulm htn on remodulin 19ng no jaw pain, no diarrhea- site clean, kevin\par no further facial swelling - broken tooth right upper gum\par She is c/o worsening DOE_ uses oxygen only at night- off diuretics\par breast mass- s/p biopsy- negative for malignancy\par \par SEPT 2020--pulm htn  REMODULIN TO 21 NG/KG/MIN, using  OXYGEN  at 2 lpm,  ON XARELTO AND DIURETICS----[place] LUNG TRANSPLANT APPOINTMENT   SEPT 21\par Currently on clindamycin for skin infection at old remodulin site

## 2020-09-15 NOTE — DISCUSSION/SUMMARY
[FreeTextEntry1] : -----Assessment and Plan--------39 year-old lady with PULMONARY ARTERIAL HYPERTENSION  S/P PPM  FOR TACHYCARDIA AT [place] ON S/Q REMODULIN - S/P LEFT BREAST BIOPSY \par \par 1]   PULM  ART HTN     --------- WHO GROUP I FUNCTIONAL CLASS II----[ AS PER  DR FREEMAN  WHO DID PULM ANGIO SHE HAS FEATURES OF GROUP I  WITH SOME EVIDENCE OF DISTAL CLOTS] She is on remodulin -Increase 22ng - ALSO OPSUMIT --- Restart sildenafil 20mg  bid  \par 2) of oxygen medically necessary given severe Pulm Htn, anemia and h/o nocturnal desaturation which could worsen Pulm HTN. ADvised oxygen 2 lpm x 24hrs\par 3)  lasix 20mg every other day\par 4) ) -She is  on xarelto---will be on lifelong anticoagulation---.\par 5) , -asthma -use nebs , PREDNISone 7.5mg , \par 6) S/P PPM- follows EP clinic at [place] ---NEEDS TO F/U WITH EPS SERVICE AS WELL\par 7) chronic palpitations- seeing Dr Garcia- heart failure clinic\par 8)  she has features of ERICA- including sleep disturbance, nocturnal desat, and excessive fatigue-  HST was negative for ERICA in 2014 but had desaturation. Will try to repeat HST.\par 9)  has consult /[place] [facility] Transplant Team -----\par 10) Anemia: Will repeat Iron studies. Patient was advised to follow up with Hematology for possible Iron infusion \par 11) breast mass- biopsy negative for malignancy- year;y mammo advised\par 12) s/p influenza vac in office today\par 13) f/u in one month\par 14) advised to rotate remodulin site d/t repeated infections- will send in [facility] RN to re-educate on cath placement and care\par \par \par \par   Patient at this time  will follow  the above mentioned recommendations --and f/u in DEC 2018----If you have any questions  I can be reached  at #  [phone]. \par \par Vivian Yoon MD, FCCP \par Director, Pulmonary Hypertension Program \par [facility] \par Division of Pulmonary, Critical Care and Sleep Medicine \par  Professor of Medicine \par [employer]\par \par

## 2020-10-05 ENCOUNTER — APPOINTMENT (OUTPATIENT)
Dept: PULMONOLOGY | Facility: CLINIC | Age: 40
End: 2020-10-05

## 2020-10-13 ENCOUNTER — APPOINTMENT (OUTPATIENT)
Dept: PULMONOLOGY | Facility: CLINIC | Age: 40
End: 2020-10-13
Payer: MEDICAID

## 2020-10-13 VITALS
DIASTOLIC BLOOD PRESSURE: 76 MMHG | WEIGHT: 202 LBS | HEIGHT: 67 IN | TEMPERATURE: 98 F | BODY MASS INDEX: 31.71 KG/M2 | SYSTOLIC BLOOD PRESSURE: 108 MMHG | RESPIRATION RATE: 18 BRPM | HEART RATE: 100 BPM

## 2020-10-13 DIAGNOSIS — R00.0 TACHYCARDIA, UNSPECIFIED: ICD-10-CM

## 2020-10-13 PROCEDURE — 99215 OFFICE O/P EST HI 40 MIN: CPT

## 2020-10-13 RX ORDER — SILDENAFIL 20 MG/1
20 TABLET ORAL
Qty: 60 | Refills: 3 | Status: DISCONTINUED | COMMUNITY
Start: 2020-08-18 | End: 2020-10-13

## 2020-10-13 NOTE — DISCUSSION/SUMMARY
[FreeTextEntry1] : -----Assessment and Plan--------39 year-old lady with PULMONARY ARTERIAL HYPERTENSION  S/P PPM  FOR TACHYCARDIA AT [place] ON S/Q REMODULIN - S/P LEFT BREAST BIOPSY \par \par 1]   PULM  ART HTN     --------- WHO GROUP I FUNCTIONAL CLASS II----[ AS PER  DR FREEMAN  WHO DID PULM ANGIO SHE HAS FEATURES OF GROUP I  WITH SOME EVIDENCE OF DISTAL CLOTS] She is on remodulin -Increase 23ng--- Restart sildenafil and OPSUMIT when Remodulin is titrated up to 25.  \par 2) of oxygen medically necessary given severe Pulm Htn, anemia and h/o nocturnal desaturation which could worsen Pulm HTN. ADvised oxygen 2 lpm x 24hrs\par 3)  Now off diuresis, euvolemic. Monitor need for lasix. . \par 4) ) -She is  on xarelto---will be on lifelong anticoagulation---.\par 5) , -asthma -use nebs , PREDNISone 7.5mg , \par 6) S/P PPM- follows EP clinic at [place] ---NEEDS TO F/U WITH EPS SERVICE AS WELL\par 7) chronic palpitations- seeing Dr Garcia- heart failure clinic\par 8)  she has features of ERICA- including sleep disturbance, nocturnal desat, and excessive fatigue-  HST was negative for ERICA in 2014 but had desaturation. Will try to repeat HST.\par 9)  has consult /[place] [facility] Transplant Team -----\par 10) Anemia: Last hg was 11. Will monitor for need for transfusions/iron\par 11) breast mass- biopsy negative for malignancy- year;y mammo advised\par 12) s/p influenza vac\par 13) f/u in one month\par 14) advised to rotate remodulin site d/t repeated infections- will send in [facility] RN to re-educate on cath placement and care\par \par \par \par   Patient at this time  will follow  the above mentioned recommendations --and f/u in 1 month---If you have any questions  I can be reached  at #  [phone]. \par \par Vivian Yoon MD, [facility] \par Director, Pulmonary Hypertension Program \par [facility] \par Division of Pulmonary, Critical Care and Sleep Medicine \par  Professor of Medicine \par [employer]\par \par

## 2020-10-13 NOTE — HISTORY OF PRESENT ILLNESS
[TextBox_4] : ------Patient is here for follow up of her pulmonary htn. ------------------INITIALLY  REFD  WITH   ECHO [DONE  OUTSIDE ] ELEVATED PASP  DILATED RV AND RA---------  HAS BEEN TOLD IN THE PAST SHE HAS  ASTHMA-----long-standing history of dyspnea on exertion-----------------has baseline tachycardia ----.....No history of fever, chills , rigors, chest pain, or hemoptysis. No h/o significant weight loss in last few months. No history of liver dysfunction , collagen vascular disorder or chronic thromboembolic disease. I would classify her dyspnea as WHO  FUNCTIONAL CLASS III-----------no calf tenderness----------SLEEP STUDY SHOWS AHI 0.6 but T 90  < 35 %-----DIAGNOSED  AS PAH ---------WAS ON ADMAPAS  NOW ON   SQ REMODULIN  [ SINCE MAY 2108]  ---S/P PACEMAKER PLACEMENT AT [place]  AND ON METOPROLOL------\par \par ------cardiac CATH ---- 28/ july / 2014.......PAP -76 , Aortic pressure -84, left ventricle edp 10 MMHG , rt atrium - 13, PAP after NO - 42--- CO 3.19,   PVR 1928.78 ( dsc) 24.12 ( woods) PVR after NO ( 692.51( dsc), 8.66 ( enrique)\par \par CARDIAC  CATH 2108-----\par --CTA CHEST---- july 23/2014--- no evidence of PE. non specific ground glass opacities throughout the lungs....moderate pericardial effusion ---\par --repeat CT chest WITHOUT CONTRAST  12/2014 MOSAIC PATTERN UNCHANGED  FROM BEFORE\par vq scan 6/2015 abnormal- low prob PE\par cxr-6/2015 no acute disease \par ---------CT CHEST-----1. Enlarged main pulmonary artery compatible with pulmonary \par hypertension. -2. Patchy bilateral groundglass are unchanged since 06/20/2015 and are of \par uncertain origin. ------       \par \par ECHO ....July 24/ 2014----right ventricular enlargement with decrease ventricular systolic function...estimated rt ventricle systolic pre equals 113mmhg, ---\par \par ECHO 2018----  EF80, [id number]\par \par SEPT 2104  6MWT- WALKED 301 METERS-------PSG   2014   LOW  T90 BUT NO ERICA\par \par \par \par  june 2015---SAW DR FREEMAN  AT [place]  -cath showed   elevated  pressures but  pa ANGIOgram   should only distal clts  not a candidate for  surgery for CTEPH---RA 4 mmhg, RV 46/5, PA 49/24 mean PA 33, PCWP 13   WITH SUGGESTION OF VASODILATOR  RESPONSE  [ D/W DR LUONG]------AS PER HEATH FREEMAN  SHE HAS GROUP I PAH  WITH DISTAL CLOTS S RECOMMENDED   MEDICAL TREATMENT---PT ON  PROCARDIA  AND  ADMAPAS---DID NOT TOLERATE OPSUMIT IN PAST [[-[ DUE TO LOW BP] --------- -------------------------------.\par \par ALSO ON ANTICOAGULATION-follows coumadin clinic  ------- elevated INR today DENIES BLEEDING/HEMATURIA/MELENA\par \par She reports noncompliance to adempas since 8/1/15 has  basla tachycardia f/u with DR KRISHNAMURTHY  cardilogy ---\par \par ---PFT AUG 2016------FVC 2.44, FEV 1 1.61,   TLC 4.37, mild to moderate obstructive lung defect\par ----jan 2017 6MWD 250 m---\par ---2019  6MWT    ---165 METERS\par \par --------ECHO AUG 2016-----EF 82%-----PASP 26 mmHg\par --CATH 2014 ----MEAN PA 77, LVEDP 10, CO 3.14\par \par ------CT SCAN AUG 2016-------patchy bilateral groundglass unchanged since 6/20/2015-----enlarged PA 3.5 cm-----\par CT SCAN  2018--NO PE, PA 4.0 CM,MOSAIC ATTENUATION\par \par july 2018 s/p hospitalization for syncopal episode at home. She was transferred to [facility] where  a pacemaker was inserted d/t tachycardia and syncope. She was also subsequently started on remodulin SQ by Dr Chow--current rate 12ng. She is still have dyspnea.\par \par ------OCT 2018--------s/p hospitalization at [facility]------S/P PACEMAKER PLACEMENT AT [place]------\par \par november 2019 here for acute visit- PAH w/remodulin @16ng infusing subcutaneously\par she is c/o chronic  palpitations, slightly improved.\par comes in today for cough and wheezing- started zpack and prednisone 2 days ago\par \par \par jan 17 2020- PAH- remodulin @16ng- SQ site - has stopped draining, less painful - completed doxy last week and is now on clindamycin ---\par She is also c/o asthma exacerbation- started prednisone 10mg and using nebs- symptoms somewhat improved from a few days ago\par \par Feb 25, 2020  PAH on Xarelto and Remodulin still at 16ng SQ site. New site looks good without infection. Old site has completely healed. Patient still having intermittent jaw claudication. She has more tachycardia with periods. She has Iron deficiency anemia and has stopped taking her Iron due to constipation. she otherwise has good exercise tolerance. Has oxygen at home and is using it.\par \par JULY 2020-----S/P    LEFT BREAST BIOPSY  -------    PAH ON XARELTO   , REMODULIN 19 NG/KG/MIN ----ON OPSUMIT AND  DIURETICS --  HAS GUM PAIN / \par \par aug 2020 pulm htn on remodulin 19ng no jaw pain, no diarrhea- site clean, kevin\par no further facial swelling - broken tooth right upper gum\par She is c/o worsening DOE_ uses oxygen only at night- off diuretics\par breast mass- s/p biopsy- negative for malignancy\par \par SEPT 2020--pulm htn  REMODULIN TO 21 NG/KG/MIN, using  OXYGEN  at 2 lpm,  ON XARELTO AND DIURETICS----[place] LUNG TRANSPLANT APPOINTMENT   SEPT 21\par Currently on clindamycin for skin infection at old remodulin site\par \par 10/2020- Remodulin now up to 22 NG/KG/MIN, using  OXYGEN  at 2 lpm,  ON XARELTO, now off diuretics. S/p [place] transplant eval pending further testing. Old remodulin site is d/c/i. Otherwise respiratory symptoms are stable.

## 2020-10-14 RX ORDER — CHLORHEXIDINE GLUCONATE 4 %
325 (65 FE) LIQUID (ML) TOPICAL TWICE DAILY
Qty: 60 | Refills: 11 | Status: DISCONTINUED | COMMUNITY
Start: 2020-01-15 | End: 2020-10-14

## 2020-10-14 RX ORDER — CLINDAMYCIN HYDROCHLORIDE 300 MG/1
300 CAPSULE ORAL EVERY 8 HOURS
Qty: 21 | Refills: 0 | Status: DISCONTINUED | COMMUNITY
Start: 2020-01-14 | End: 2020-10-14

## 2020-10-14 RX ORDER — SAW PALMETTO 160 MG
500 CAPSULE ORAL DAILY
Qty: 30 | Refills: 5 | Status: DISCONTINUED | COMMUNITY
Start: 2020-03-25 | End: 2020-10-14

## 2020-10-14 RX ORDER — FUROSEMIDE 20 MG/1
20 TABLET ORAL
Qty: 12 | Refills: 1 | Status: DISCONTINUED | COMMUNITY
Start: 2020-08-18 | End: 2020-10-14

## 2020-10-14 RX ORDER — AZITHROMYCIN 250 MG/1
250 TABLET, FILM COATED ORAL
Qty: 1 | Refills: 0 | Status: DISCONTINUED | COMMUNITY
Start: 2020-09-01 | End: 2020-10-14

## 2020-10-14 RX ORDER — LECITHIN/ISOPROPYL PALMITATE
LIQUID (ML) MISCELLANEOUS
Qty: 1 | Refills: 11 | Status: DISCONTINUED | OUTPATIENT
Start: 2020-06-18 | End: 2020-10-14

## 2020-10-16 ENCOUNTER — APPOINTMENT (OUTPATIENT)
Dept: HEART FAILURE | Facility: CLINIC | Age: 40
End: 2020-10-16

## 2020-10-26 ENCOUNTER — APPOINTMENT (OUTPATIENT)
Dept: PULMONOLOGY | Facility: CLINIC | Age: 40
End: 2020-10-26
Payer: MEDICAID

## 2020-10-26 ENCOUNTER — INPATIENT (INPATIENT)
Facility: HOSPITAL | Age: 40
LOS: 9 days | Discharge: HOME | DRG: 315 | End: 2020-11-05
Attending: INTERNAL MEDICINE | Admitting: INTERNAL MEDICINE
Payer: MEDICAID

## 2020-10-26 VITALS
OXYGEN SATURATION: 98 % | DIASTOLIC BLOOD PRESSURE: 85 MMHG | RESPIRATION RATE: 18 BRPM | HEART RATE: 130 BPM | BODY MASS INDEX: 31.34 KG/M2 | SYSTOLIC BLOOD PRESSURE: 114 MMHG | WEIGHT: 200.13 LBS | TEMPERATURE: 98.2 F

## 2020-10-26 VITALS
SYSTOLIC BLOOD PRESSURE: 109 MMHG | HEART RATE: 107 BPM | TEMPERATURE: 98 F | OXYGEN SATURATION: 98 % | WEIGHT: 199.96 LBS | HEIGHT: 67 IN | DIASTOLIC BLOOD PRESSURE: 71 MMHG | RESPIRATION RATE: 18 BRPM

## 2020-10-26 DIAGNOSIS — Z98.51 TUBAL LIGATION STATUS: Chronic | ICD-10-CM

## 2020-10-26 DIAGNOSIS — I27.82 CHRONIC PULMONARY EMBOLISM: ICD-10-CM

## 2020-10-26 DIAGNOSIS — I27.20 PULMONARY HYPERTENSION, UNSPECIFIED: ICD-10-CM

## 2020-10-26 DIAGNOSIS — R06.02 SHORTNESS OF BREATH: ICD-10-CM

## 2020-10-26 DIAGNOSIS — Z95.0 PRESENCE OF CARDIAC PACEMAKER: Chronic | ICD-10-CM

## 2020-10-26 DIAGNOSIS — I27.29 OTHER SECONDARY PULMONARY HYPERTENSION: ICD-10-CM

## 2020-10-26 DIAGNOSIS — R00.0 TACHYCARDIA, UNSPECIFIED: ICD-10-CM

## 2020-10-26 LAB
ALBUMIN SERPL ELPH-MCNC: 4.3 G/DL — SIGNIFICANT CHANGE UP (ref 3.3–5)
ALP SERPL-CCNC: 62 U/L — SIGNIFICANT CHANGE UP (ref 40–120)
ALT FLD-CCNC: 9 U/L — LOW (ref 10–45)
ANION GAP SERPL CALC-SCNC: 11 MMOL/L — SIGNIFICANT CHANGE UP (ref 5–17)
APTT BLD: 34.5 SEC — SIGNIFICANT CHANGE UP (ref 27.5–35.5)
AST SERPL-CCNC: 15 U/L — SIGNIFICANT CHANGE UP (ref 10–40)
BILIRUB SERPL-MCNC: 0.6 MG/DL — SIGNIFICANT CHANGE UP (ref 0.2–1.2)
BUN SERPL-MCNC: 8 MG/DL — SIGNIFICANT CHANGE UP (ref 7–23)
CALCIUM SERPL-MCNC: 8.8 MG/DL — SIGNIFICANT CHANGE UP (ref 8.4–10.5)
CHLORIDE SERPL-SCNC: 103 MMOL/L — SIGNIFICANT CHANGE UP (ref 96–108)
CO2 SERPL-SCNC: 20 MMOL/L — LOW (ref 22–31)
CREAT SERPL-MCNC: 1.1 MG/DL — SIGNIFICANT CHANGE UP (ref 0.5–1.3)
GAS PNL BLDV: SIGNIFICANT CHANGE UP
GLUCOSE SERPL-MCNC: 74 MG/DL — SIGNIFICANT CHANGE UP (ref 70–99)
HCT VFR BLD CALC: 38.2 % — SIGNIFICANT CHANGE UP (ref 34.5–45)
HGB BLD-MCNC: 10.9 G/DL — LOW (ref 11.5–15.5)
INR BLD: 1.19 RATIO — HIGH (ref 0.88–1.16)
MCHC RBC-ENTMCNC: 17.8 PG — LOW (ref 27–34)
MCHC RBC-ENTMCNC: 28.5 GM/DL — LOW (ref 32–36)
MCV RBC AUTO: 62.5 FL — LOW (ref 80–100)
NRBC # BLD: 0 /100 WBCS — SIGNIFICANT CHANGE UP (ref 0–0)
NT-PROBNP SERPL-SCNC: 2492 PG/ML — HIGH (ref 0–300)
PLATELET # BLD AUTO: 445 K/UL — HIGH (ref 150–400)
POTASSIUM SERPL-MCNC: 3.8 MMOL/L — SIGNIFICANT CHANGE UP (ref 3.5–5.3)
POTASSIUM SERPL-SCNC: 3.8 MMOL/L — SIGNIFICANT CHANGE UP (ref 3.5–5.3)
PROT SERPL-MCNC: 7.3 G/DL — SIGNIFICANT CHANGE UP (ref 6–8.3)
PROTHROM AB SERPL-ACNC: 14.2 SEC — HIGH (ref 10.6–13.6)
RAPID RVP RESULT: SIGNIFICANT CHANGE UP
RBC # BLD: 6.11 M/UL — HIGH (ref 3.8–5.2)
RBC # FLD: 21.5 % — HIGH (ref 10.3–14.5)
SARS-COV-2 RNA SPEC QL NAA+PROBE: SIGNIFICANT CHANGE UP
SODIUM SERPL-SCNC: 134 MMOL/L — LOW (ref 135–145)
TROPONIN T, HIGH SENSITIVITY RESULT: 7 NG/L — SIGNIFICANT CHANGE UP (ref 0–51)
WBC # BLD: 9.06 K/UL — SIGNIFICANT CHANGE UP (ref 3.8–10.5)
WBC # FLD AUTO: 9.06 K/UL — SIGNIFICANT CHANGE UP (ref 3.8–10.5)

## 2020-10-26 PROCEDURE — 76937 US GUIDE VASCULAR ACCESS: CPT | Mod: 26

## 2020-10-26 PROCEDURE — 36000 PLACE NEEDLE IN VEIN: CPT

## 2020-10-26 PROCEDURE — 99072 ADDL SUPL MATRL&STAF TM PHE: CPT

## 2020-10-26 PROCEDURE — 71045 X-RAY EXAM CHEST 1 VIEW: CPT | Mod: 26

## 2020-10-26 PROCEDURE — 71260 CT THORAX DX C+: CPT | Mod: 26

## 2020-10-26 PROCEDURE — 93308 TTE F-UP OR LMTD: CPT | Mod: 26

## 2020-10-26 PROCEDURE — 99215 OFFICE O/P EST HI 40 MIN: CPT

## 2020-10-26 PROCEDURE — 99285 EMERGENCY DEPT VISIT HI MDM: CPT

## 2020-10-26 RX ORDER — FERROUS SULFATE 325(65) MG
1 TABLET ORAL
Qty: 0 | Refills: 0 | DISCHARGE

## 2020-10-26 RX ORDER — IPRATROPIUM BROMIDE 0.2 MG/ML
500 SOLUTION, NON-ORAL INHALATION ONCE
Refills: 0 | Status: COMPLETED | OUTPATIENT
Start: 2020-10-26 | End: 2020-10-26

## 2020-10-26 RX ORDER — RIVAROXABAN 15 MG-20MG
10 KIT ORAL DAILY
Refills: 0 | Status: DISCONTINUED | OUTPATIENT
Start: 2020-10-26 | End: 2020-11-05

## 2020-10-26 RX ORDER — PANTOPRAZOLE SODIUM 20 MG/1
40 TABLET, DELAYED RELEASE ORAL
Refills: 0 | Status: DISCONTINUED | OUTPATIENT
Start: 2020-10-26 | End: 2020-11-05

## 2020-10-26 RX ORDER — IPRATROPIUM BROMIDE 0.2 MG/ML
1 SOLUTION, NON-ORAL INHALATION EVERY 6 HOURS
Refills: 0 | Status: DISCONTINUED | OUTPATIENT
Start: 2020-10-26 | End: 2020-11-05

## 2020-10-26 RX ADMIN — Medication 500 MICROGRAM(S): at 23:39

## 2020-10-26 NOTE — ED PROVIDER NOTE - PHYSICAL EXAMINATION
Gen: WDWN, NAD  HEENT: EOMI, no nasal discharge, mucous membranes moist  CV: RRR, +S1/S2, no M/R/G  Resp: (+) end expiratory wheezes   GI: Abdomen soft non-distended, NTTP, no masses  MSK: No open wounds, no bruising, no LE edema  Neuro: A&Ox4, following commands, moving all four extremities spontaneously  Psych: appropriate mood, denies AH, VH, SI

## 2020-10-26 NOTE — H&P ADULT - PROBLEM SELECTOR PLAN 1
Patient with palpitations, sinus tach on tele, CE x 1 negative, ? etiology, needs cards / EP eval. Follow up Echo.

## 2020-10-26 NOTE — ED ADULT NURSE REASSESSMENT NOTE - NS ED NURSE REASSESS COMMENT FT1
Pt returned fro CT scan.  VSS. Pt resting comfortably in bed. Safety and comfort measures provided, bed placed in lowest position and side rails raised. Pt oriented to call bell system.

## 2020-10-26 NOTE — HISTORY OF PRESENT ILLNESS
[TextBox_4] : ------Patient is here for follow up of her pulmonary htn. ------------------INITIALLY  REFD  WITH   ECHO [DONE  OUTSIDE ] ELEVATED PASP  DILATED RV AND RA---------  HAS BEEN TOLD IN THE PAST SHE HAS  ASTHMA-----long-standing history of dyspnea on exertion-----------------has baseline tachycardia ----.....No history of fever, chills , rigors, chest pain, or hemoptysis. No h/o significant weight loss in last few months. No history of liver dysfunction , collagen vascular disorder or chronic thromboembolic disease. I would classify her dyspnea as WHO  FUNCTIONAL CLASS III-----------no calf tenderness----------SLEEP STUDY SHOWS AHI 0.6 but T 90  < 35 %-----DIAGNOSED  AS PAH ---------WAS ON ADMAPAS  NOW ON   SQ REMODULIN  [ SINCE MAY 2108]  ---S/P PACEMAKER PLACEMENT AT [place]  AND ON METOPROLOL------\par \par ------cardiac CATH ---- 28/ july / 2014.......PAP -76 , Aortic pressure -84, left ventricle edp 10 MMHG , rt atrium - 13, PAP after NO - 42--- CO 3.19,   PVR 1928.78 ( dsc) 24.12 ( woods) PVR after NO ( 692.51( dsc), 8.66 ( enrique)\par \par CARDIAC  CATH 2108-----\par --CTA CHEST---- july 23/2014--- no evidence of PE. non specific ground glass opacities throughout the lungs....moderate pericardial effusion ---\par --repeat CT chest WITHOUT CONTRAST  12/2014 MOSAIC PATTERN UNCHANGED  FROM BEFORE\par vq scan 6/2015 abnormal- low prob PE\par cxr-6/2015 no acute disease \par ---------CT CHEST-----1. Enlarged main pulmonary artery compatible with pulmonary \par hypertension. -2. Patchy bilateral groundglass are unchanged since 06/20/2015 and are of \par uncertain origin. ------       \par \par ECHO ....July 24/ 2014----right ventricular enlargement with decrease ventricular systolic function...estimated rt ventricle systolic pre equals 113mmhg, ---\par \par ECHO 2018----  EF80, [id number]\par \par SEPT 2104  6MWT- WALKED 301 METERS-------PSG   2014   LOW  T90 BUT NO ERICA\par \par \par \par  june 2015---SAW DR FREEMAN  AT [place]  -cath showed   elevated  pressures but  pa ANGIOgram   should only distal clts  not a candidate for  surgery for CTEPH---RA 4 mmhg, RV 46/5, PA 49/24 mean PA 33, PCWP 13   WITH SUGGESTION OF VASODILATOR  RESPONSE  [ D/W DR LUONG]------AS PER HEATH FREEMAN  SHE HAS GROUP I PAH  WITH DISTAL CLOTS S RECOMMENDED   MEDICAL TREATMENT---PT ON  PROCARDIA  AND  ADMAPAS---DID NOT TOLERATE OPSUMIT IN PAST [[-[ DUE TO LOW BP] --------- -------------------------------.\par \par ALSO ON ANTICOAGULATION-follows coumadin clinic  ------- elevated INR today DENIES BLEEDING/HEMATURIA/MELENA\par \par She reports noncompliance to adempas since 8/1/15 has  basla tachycardia f/u with DR KRISHNAMURTHY  cardilogy ---\par \par ---PFT AUG 2016------FVC 2.44, FEV 1 1.61,   TLC 4.37, mild to moderate obstructive lung defect\par ----jan 2017 6MWD 250 m---\par ---2019  6MWT    ---165 METERS\par \par --------ECHO AUG 2016-----EF 82%-----PASP 26 mmHg\par --CATH 2014 ----MEAN PA 77, LVEDP 10, CO 3.14\par \par ------CT SCAN AUG 2016-------patchy bilateral groundglass unchanged since 6/20/2015-----enlarged PA 3.5 cm-----\par CT SCAN  2018--NO PE, PA 4.0 CM,MOSAIC ATTENUATION\par \par july 2018 s/p hospitalization for syncopal episode at home. She was [facility] to [facility] where  a pacemaker was inserted d/t tachycardia and syncope. She was also subsequently started on remodulin SQ by Dr Chow--current rate 12ng. She is still have dyspnea.\par \par ------OCT 2018--------s/p hospitalization at [facility]------S/P PACEMAKER PLACEMENT AT [place]------\par \par november 2019 here for acute visit- PAH w/remodulin @16ng infusing subcutaneously\par she is c/o chronic  palpitations, slightly improved.\par comes in today for cough and wheezing- started zpack and prednisone 2 days ago\par \par \par jan 17 2020- PAH- remodulin @16ng- SQ site - has stopped draining, less painful - completed doxy last week and is now on clindamycin ---\par She is also c/o asthma exacerbation- started prednisone 10mg and using nebs- symptoms somewhat improved from a few days ago\par \par Feb 25, 2020  PAH on Xarelto and Remodulin still at 16ng SQ site. New site looks good without infection. Old site has completely healed. Patient still having intermittent jaw claudication. She has more tachycardia with periods. She has Iron deficiency anemia and has stopped taking her Iron due to constipation. she otherwise has good exercise tolerance. Has oxygen at home and is using it.\par \par JULY 2020-----S/P    LEFT BREAST BIOPSY  -------    PAH ON XARELTO   , REMODULIN 19 NG/KG/MIN ----ON OPSUMIT AND  DIURETICS --  HAS GUM PAIN / \par \par aug 2020 pulm htn on remodulin 19ng no jaw pain, no diarrhea- site clean, kevin\par no further facial swelling - broken tooth right upper gum\par She is c/o worsening DOE_ uses oxygen only at night- off diuretics\par breast mass- s/p biopsy- negative for malignancy\par \par SEPT 2020--pulm htn  REMODULIN TO 21 NG/KG/MIN, using  OXYGEN  at 2 lpm,  ON XARELTO AND DIURETICS----[place] LUNG TRANSPLANT APPOINTMENT   SEPT 21\par Currently on clindamycin for skin infection at old remodulin site\par \par 10/2020- Remodulin now up to 23 NG/KG/MIN, using  OXYGEN  at 2 lpm,  ON XARELTO, now off diuretics. S/p [place] transplant eval pending further testing.  remodulin site is d/c/i. Otherwise respiratory symptoms are stable. c/o palpitations, weakness and dyspnea worse\par \par

## 2020-10-26 NOTE — ED ADULT NURSE NOTE - NSIMPLEMENTINTERV_GEN_ALL_ED
Implemented All Universal Safety Interventions:  [place] to call system. Call bell, personal items and telephone within reach. Instruct patient to call for assistance. Room bathroom lighting operational. Non-slip footwear when patient is off stretcher. Physically safe environment: no spills, clutter or unnecessary equipment. Stretcher in lowest position, wheels locked, appropriate side rails in place.

## 2020-10-26 NOTE — ED PROVIDER NOTE - ATTENDING CONTRIBUTION TO CARE
I saw and evaluated patient with resident. I discussed H+P and MDM with resident. I agree with the statements made by the resident unless otherwise noted.    The care of this patient was in support of the [facility] countermeasures to Covid-19.

## 2020-10-26 NOTE — H&P ADULT - ASSESSMENT
36 F with pmhx PE on coumadin, pulmonary HTN, anemia referred by her Pulmonologist for tachycardia.

## 2020-10-26 NOTE — ED ADULT NURSE NOTE - PMH
Anemia    Asthma  On home O2 nightly at 2L via [place]  Corneal disorder    PE (pulmonary thromboembolism)  on Xarelto 10 mg daily  Pulmonary embolism    Pulmonary hypertension    Right heart failure    Sinusitis    Tachycardia

## 2020-10-26 NOTE — H&P ADULT - HISTORY OF PRESENT ILLNESS
36 F with pmhx PE on coumadin, pulmonary HTN, anemia referred by her Pulmonologist for evaluation of tachycardia.   Patient states that she went to her pulmonologist office for follow up and routine visit, states that she had an episode of palpitations yesterday with worsening dyspnea on exertion over the past few dyas. No hx chest pain/ fever/ chills, rigors.   ROS other wise negative. Patient diagnosed as having PAH was on Admapas now on SQ Remodulin since May 2018. 36 F with pmhx PE on coumadin, pulmonary HTN, anemia referred by her Pulmonologist for tachycardia.   Patient states that she went to her pulmonologist office for follow up and routine visit, states that she had an episode of palpitations yesterday with worsening dyspnea on exertion over the past few dyas. No hx chest pain/ fever/ chills, rigors.   ROS other wise negative. Patient diagnosed as having PAH was on Admapas now on SQ Remodulin since May 2018.

## 2020-10-26 NOTE — ED ADULT NURSE REASSESSMENT NOTE - NS ED NURSE REASSESS COMMENT FT1
Bedside report given to on coming nurse She . Understands medical history medications given and plan of care for patient. Patient in stable condition, vital signs updated, has no complaints at this time and has been updated on care plan. Explained to patient that it is change of shift and new nurse is taking over, pt verbalized understanding.

## 2020-10-26 NOTE — ED ADULT NURSE REASSESSMENT NOTE - NS ED NURSE REASSESS COMMENT FT1
Patient aware they are being admitted to the hospital. Will be notified when bed is available. Patient has no further questions at this time.

## 2020-10-26 NOTE — ED PROVIDER NOTE - OBJECTIVE STATEMENT
40F PMH of Severe Pulmonary HTN on Remodulin 23ng/kg/min outpatient and longstanding SOB (on 2LNC at baseline), chronic PE on Xarelto, now off diuretics presents here for 1 day history of worsening dyspnea (WHO Functional Class III) and tachycardia. No fevers, chills, chest pain hemoptysis, calf tenderness. Patient sent in from Dr. Yoon's office.

## 2020-10-26 NOTE — ED ADULT NURSE NOTE - OBJECTIVE STATEMENT
39y/o female  arrives to the ER ambulatory complaining of SOB on exertion. PMH of PE on Xarelto, pulmonary hypertension, right heart failure.  Pt is A&Ox4, speaking coherently. Pt states having difficulty breathing and racing heart rate since yesterday. Pt has sent to the  ER by her pulmonologist to be admitted.   Pt placed on continuous pulse ox and cardiac monitor, sinus tachycardia noted. Remodolion pump on the abdomen noted upon assessment. On assessment airway is patent, breathing spontaneously and unlabored, skin is dry, warm and color appropriate to race. abdomen is soft, non distended, non tender, ROM in all extremities, pulse motor sensory intact.  Pt denies chest pain, NVD, fevers, chills, no pedal edema.  Comfort measures provided. call bell within reach, bed locked in the lowest position. will continue to reassess .

## 2020-10-26 NOTE — REVIEW OF SYSTEMS
[Orthopnea] : orthopnea [GERD] : gerd [Anemia] : anemia [Fever] : no fever [Chills] : no chills [Dry Eyes] : no dry eyes [Cough] : no cough [Dyspnea] : dyspnea [SOB on Exertion] : sob on exertion [Chest Discomfort] : no chest discomfort [Palpitations] : palpitations [Nasal Discharge] : no nasal discharge [Nocturia] : no nocturia [Arthralgias] : no arthralgias [Raynaud] : no raynaud [Headache] : no headache [Depression] : no depression [Diabetes] : no diabetes

## 2020-10-26 NOTE — H&P ADULT - NSICDXPASTMEDICALHX_GEN_ALL_CORE_FT
PAST MEDICAL HISTORY:  Anemia     Asthma On home O2 nightly at 2L via NC    Corneal disorder     PE (pulmonary thromboembolism) on Xarelto 10 mg daily    Pulmonary embolism     Pulmonary hypertension     Right heart failure     Sinusitis     Tachycardia

## 2020-10-26 NOTE — ED PROVIDER NOTE - CLINICAL SUMMARY MEDICAL DECISION MAKING FREE TEXT BOX
40F PMH of severe Pulm HTN and chronic PE here for SOB and tachycardia. Concern for fluid overload or acute on chronic PE vs asthma exacerbation. Low concern for ACS. Will order bnp, trops, echo, EKG, likely admit. 40F PMH of severe Pulm HTN and chronic PE here for SOB and tachycardia. Concern for fluid overload or acute on chronic PE vs asthma exacerbation. Low concern for ACS. Will order bnp, trops, echo, EKG, likely admit.    BETTY Nicole MD: Agree with resident statement above

## 2020-10-26 NOTE — H&P ADULT - PROBLEM SELECTOR PLAN 2
Last Echo 07/21/20- Severe pulmonary HTN with decreased RV function.   Continue with Oxygen, patient off Lasix as per pulmonary.  Continue with Remodulin, dose to be verified by Clinical pharmacy.

## 2020-10-26 NOTE — ED PROVIDER NOTE - NS ED ROS FT
Gen: No fever, normal appetite  Eyes: No eye irritation or discharge  ENT: No ear pain, congestion, sore throat  Resp: (+) trouble breathing  Cardiovascular: (+) palpitation  Gastroenteric: No nausea/vomiting, diarrhea, constipation  :  No change in urine output; no dysuria  MS: No joint or muscle pain  Skin: No rashes  Neuro: No headache; no abnormal movements  Remainder negative, except as per the HPI

## 2020-10-26 NOTE — DISCUSSION/SUMMARY
[FreeTextEntry1] : -----Assessment and Plan--------39 year-old lady with PULMONARY ARTERIAL HYPERTENSION  S/P PPM  FOR TACHYCARDIA AT [place] ON S/Q REMODULIN - S/P LEFT BREAST BIOPSY \par \par 1]   PULM  ART HTN     --------- WHO GROUP I FUNCTIONAL CLASS II----[ AS PER  DR FREEMAN  WHO DID PULM ANGIO SHE HAS FEATURES OF GROUP I  WITH SOME EVIDENCE OF DISTAL CLOTS] She is on remodulin 23ng/kg/min  --and OPSUMIT on hold\par 2) of oxygen medically necessary given severe Pulm Htn, anemia and h/o nocturnal desaturation which could worsen Pulm HTN. ADvised oxygen 2 lpm x 24hrs\par 3)  Now off diuresis, euvolemic. Monitor need for lasix. . \par 4) ) -She is  on xarelto---will be on lifelong anticoagulation---.\par 5) , -asthma -use nebs , PREDNISone 7.5mg , \par 6)   Perssitent sinus tacycardia-----S/P PPM- follows EP clinic at [facility] --- will admit to telemtery  at [facility] ---for echo and EPS  EVALUATION-- ----------\par 7) chronic palpitations- seeing Dr Garcia- heart failure clinic\par 8)  she has features of ERICA- including sleep disturbance, nocturnal desat, and excessive fatigue-  HST was negative for ERICA in 2014 but had desaturation. Will try to repeat HST.\par 9)  has consult /[facility] -----\par 10) Anemia: Last hg was 11. Will monitor for need for transfusions/iron\par 11) breast mass- biopsy negative for malignancy- year;y mammo advised\par 12) s/p influenza vac\par 13) f/u in one month\par 14) advised to rotate remodulin site d/t repeated infections- will send in [facility] RN to re-educate on cath placement and care\par \par \par \par   Patient at this time  will follow  the above mentioned recommendations --and f/u in 1 month---If you have any questions  I can be reached  at #  [phone]. \par \par Vivian Yoon MD, FCCP \par Director, Pulmonary Hypertension Program \par [facility] \par Division of Pulmonary, Critical Care and Sleep Medicine \par  Professor of Medicine \par [employer]\par \par

## 2020-10-27 PROCEDURE — 99233 SBSQ HOSP IP/OBS HIGH 50: CPT | Mod: GC

## 2020-10-27 RX ORDER — FUROSEMIDE 40 MG
40 TABLET ORAL DAILY
Refills: 0 | Status: DISCONTINUED | OUTPATIENT
Start: 2020-10-28 | End: 2020-10-29

## 2020-10-27 RX ORDER — ACETAMINOPHEN 500 MG
650 TABLET ORAL EVERY 6 HOURS
Refills: 0 | Status: DISCONTINUED | OUTPATIENT
Start: 2020-10-27 | End: 2020-11-05

## 2020-10-27 RX ORDER — IPRATROPIUM BROMIDE 0.2 MG/ML
500 SOLUTION, NON-ORAL INHALATION EVERY 6 HOURS
Refills: 0 | Status: DISCONTINUED | OUTPATIENT
Start: 2020-10-27 | End: 2020-11-05

## 2020-10-27 RX ORDER — FUROSEMIDE 40 MG
TABLET ORAL
Refills: 0 | Status: DISCONTINUED | OUTPATIENT
Start: 2020-10-27 | End: 2020-10-29

## 2020-10-27 RX ORDER — FUROSEMIDE 40 MG
40 TABLET ORAL ONCE
Refills: 0 | Status: COMPLETED | OUTPATIENT
Start: 2020-10-27 | End: 2020-10-27

## 2020-10-27 RX ADMIN — Medication 500 MICROGRAM(S): at 12:47

## 2020-10-27 RX ADMIN — Medication 500 MICROGRAM(S): at 06:23

## 2020-10-27 RX ADMIN — Medication 650 MILLIGRAM(S): at 14:38

## 2020-10-27 RX ADMIN — Medication 650 MILLIGRAM(S): at 15:00

## 2020-10-27 RX ADMIN — Medication 40 MILLIGRAM(S): at 13:59

## 2020-10-27 RX ADMIN — Medication 500 MICROGRAM(S): at 21:24

## 2020-10-27 RX ADMIN — Medication 500 MICROGRAM(S): at 23:39

## 2020-10-27 RX ADMIN — PANTOPRAZOLE SODIUM 40 MILLIGRAM(S): 20 TABLET, DELAYED RELEASE ORAL at 06:22

## 2020-10-27 RX ADMIN — Medication 7 MILLIGRAM(S): at 06:22

## 2020-10-27 RX ADMIN — RIVAROXABAN 10 MILLIGRAM(S): KIT at 12:43

## 2020-10-27 NOTE — PROGRESS NOTE ADULT - PROBLEM SELECTOR PLAN 1
Patient with palpitations, sinus tach on tele, CE x 1 negative,   Pulm consulted.   - TTE in admission/POCUS c/f LV dysfunction 2/2 RV dilation; suspect tachycardia may be in response from decreased LV output 2/2 RV  -Lasix iv daily, monitor Is and Os.

## 2020-10-27 NOTE — CONSULT NOTE ADULT - SUBJECTIVE AND OBJECTIVE BOX
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 Of note, pt last admitted to [facility] in July 2020 for subacute worsening SOB. Discharged with increased remodulin dose.     PAST MEDICAL & SURGICAL HISTORY:  Right heart failure    Corneal disorder    Sinusitis    PE (pulmonary thromboembolism)  on Xarelto 10 mg daily    Asthma  On home O2 nightly at 2L via NC    Pulmonary embolism    Pulmonary hypertension    Anemia    Tachycardia    Pacemaker    History of tubal ligation        FAMILY HISTORY:  FH: anemia    Family history of diabetes mellitus in mother    Family history of diabetes mellitus  in brother        SOCIAL HISTORY:  Smoking: [ ] Never Smoked [ ] Former Smoker (__ packs x ___ years) [ ] Current Smoker  (__ packs x ___ years)  Substance Use: [ ] Never Used [ ] Used ____  EtOH Use:  Marital Status: [ ] Single [ ]  [ ]  [ ]   Sexual History:   Occupation:  Recent Travel:  Country of Birth:  Advance Directives:    Allergies    penicillin (Unknown)  vancomycin (Unknown)    Intolerances    albuterol (Other; Rash)      HOME MEDICATIONS:  Home Medications:  Atrovent HFA 17 mcg/inh inhalation aerosol: puff(s) inhaled , As Needed (26 Oct 2020 18:14)  ipratropium 500 mcg/2.5 mL inhalation solution: 2.5 milliliter(s) inhaled , As Needed (26 Oct 2020 18:14)  omeprazole 40 mg oral delayed release capsule: 1 cap(s) orally once a day (26 Oct 2020 18:14)  predniSONE: 7 milligram(s) orally once a day (26 Oct 2020 18:14)  Remodulin 5 mg/mL injectable solution: Please continue with your remodulin at the recommended dose of 19 ng/kg/min (26 Oct 2020 18:14)  Xarelto 10 mg oral tablet: 1 tab(s) orally once a day (26 Oct 2020 18:14)      REVIEW OF SYSTEMS:  Constitutional: [ ] negative [ ] fevers [ ] chills [ ] weight loss [ ] weight gain  HEENT: [ ] negative [ ] dry eyes [ ] eye irritation [ ] postnasal drip [ ] nasal congestion  CV: [ ] negative  [ ] chest pain [ ] orthopnea [ ] palpitations [ ] murmur  Resp: [ ] negative [ ] cough [ ] shortness of breath [ ] dyspnea [ ] wheezing [ ] sputum [ ] hemoptysis  GI: [ ] negative [ ] nausea [ ] vomiting [ ] diarrhea [ ] constipation [ ] abd pain [ ] dysphagia   : [ ] negative [ ] dysuria [ ] nocturia [ ] hematuria [ ] increased urinary frequency  Musculoskeletal: [ ] negative [ ] back pain [ ] myalgias [ ] arthralgias [ ] fracture  Skin: [ ] negative [ ] rash [ ] itch  Neurological: [ ] negative [ ] headache [ ] dizziness [ ] syncope [ ] weakness [ ] numbness  Psychiatric: [ ] negative [ ] anxiety [ ] depression  Endocrine: [ ] negative [ ] diabetes [ ] thyroid problem  Hematologic/Lymphatic: [ ] negative [ ] anemia [ ] bleeding problem  Allergic/Immunologic: [ ] negative [ ] itchy eyes [ ] nasal discharge [ ] hives [ ] angioedema  [ ] All other systems negative  [ ] Unable to assess ROS because ________    OBJECTIVE:  ICU Vital Signs Last 24 Hrs  T(C): 36.7 (27 Oct 2020 04:32), Max: 36.9 (26 Oct 2020 14:16)  T(F): 98 (27 Oct 2020 04:32), Max: 98.5 (26 Oct 2020 14:16)  HR: 116 (27 Oct 2020 04:32) (95 - 120)  BP: 109/77 (27 Oct 2020 04:32) (99/78 - 116/83)  BP(mean): 90 (26 Oct 2020 18:50) (90 - 90)  ABP: --  ABP(mean): --  RR: 18 (27 Oct 2020 04:32) (16 - 18)  SpO2: 97% (27 Oct 2020 04:32) (95% - 100%)        CAPILLARY BLOOD GLUCOSE          PHYSICAL EXAM:  General:   HEENT:   Lymph Nodes:  Neck:   Respiratory:   Cardiovascular:   Abdomen:   Extremities:   Skin:   Neurological:  Psychiatry:    HOSPITAL MEDICATIONS:  Standing Meds:  ipratropium    for Nebulization 500 MICROGram(s) Nebulizer every 6 hours  pantoprazole    Tablet 40 milliGRAM(s) Oral before breakfast  predniSONE   Tablet 7 milliGRAM(s) Oral daily  rivaroxaban 10 milliGRAM(s) Oral daily      PRN Meds:  ipratropium 17 MICROgram(s) HFA Inhaler 1 Puff(s) Inhalation every 6 hours PRN      LABS:                        10.9   9.06  )-----------( 445      ( 26 Oct 2020 16:14 )             38.2     Hgb Trend: 10.9<--  10-26    134<L>  |  103  |  8   ----------------------------<  74  3.8   |  20<L>  |  1.10    Ca    8.8      26 Oct 2020 16:14    TPro  7.3  /  Alb  4.3  /  TBili  0.6  /  DBili  x   /  AST  15  /  ALT  9<L>  /  AlkPhos  62  10-26    Creatinine Trend: 1.10<--  PT/INR - ( 26 Oct 2020 16:14 )   PT: 14.2 sec;   INR: 1.19 ratio         PTT - ( 26 Oct 2020 16:14 )  PTT:34.5 sec      Venous Blood Gas:  10-26 @ 17:27  7.36/40/37/22/59  VBG Lactate: 2.5      MICROBIOLOGY:       RADIOLOGY:  [ ] Reviewed and interpreted by me    PULMONARY FUNCTION TESTS:    EKG:   CHIEF COMPLAINT: Tachycardia    HPI:  41 yo F PMH 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 Of note, pt last admitted to [facility] in July 2020 for subacute worsening SOB. Discharged with increased remodulin dose to 19. Since then has been followed output with increasing doses and most recently on 23 as of this month.     PAST MEDICAL & SURGICAL HISTORY:  Right heart failure    Corneal disorder    Sinusitis    PE (pulmonary thromboembolism)  on Xarelto 10 mg daily    Asthma  On home O2 nightly at 2L via NC    Pulmonary embolism    Pulmonary hypertension    Anemia    Tachycardia    Pacemaker    History of tubal ligation        FAMILY HISTORY:  FH: anemia    Family history of diabetes mellitus in mother    Family history of diabetes mellitus  in brother        SOCIAL HISTORY:  Smoking: [ ] Never Smoked [ ] Former Smoker (__ packs x ___ years) [ ] Current Smoker  (__ packs x ___ years)  Substance Use: [ ] Never Used [ ] Used ____  EtOH Use:  Marital Status: [ ] Single [ ]  [ ]  [ ]   Sexual History:   Occupation:  Recent Travel:  Country of Birth:  Advance Directives:    Allergies    penicillin (Unknown)  vancomycin (Unknown)    Intolerances    albuterol (Other; Rash)      HOME MEDICATIONS:  Home Medications:  Atrovent HFA 17 mcg/inh inhalation aerosol: puff(s) inhaled , As Needed (26 Oct 2020 18:14)  ipratropium 500 mcg/2.5 mL inhalation solution: 2.5 milliliter(s) inhaled , As Needed (26 Oct 2020 18:14)  omeprazole 40 mg oral delayed release capsule: 1 cap(s) orally once a day (26 Oct 2020 18:14)  predniSONE: 7 milligram(s) orally once a day (26 Oct 2020 18:14)  Remodulin 5 mg/mL injectable solution: Please continue with your remodulin at the recommended dose of 19 ng/kg/min (26 Oct 2020 18:14)  Xarelto 10 mg oral tablet: 1 tab(s) orally once a day (26 Oct 2020 18:14)      REVIEW OF SYSTEMS:  Constitutional: [ ] negative [ ] fevers [ ] chills [ ] weight loss [ ] weight gain  HEENT: [ ] negative [ ] dry eyes [ ] eye irritation [ ] postnasal drip [ ] nasal congestion  CV: [ ] negative  [ ] chest pain [ ] orthopnea [ ] palpitations [ ] murmur  Resp: [ ] negative [ ] cough [ ] shortness of breath [ ] dyspnea [ ] wheezing [ ] sputum [ ] hemoptysis  GI: [ ] negative [ ] nausea [ ] vomiting [ ] diarrhea [ ] constipation [ ] abd pain [ ] dysphagia   : [ ] negative [ ] dysuria [ ] nocturia [ ] hematuria [ ] increased urinary frequency  Musculoskeletal: [ ] negative [ ] back pain [ ] myalgias [ ] arthralgias [ ] fracture  Skin: [ ] negative [ ] rash [ ] itch  Neurological: [ ] negative [ ] headache [ ] dizziness [ ] syncope [ ] weakness [ ] numbness  Psychiatric: [ ] negative [ ] anxiety [ ] depression  Endocrine: [ ] negative [ ] diabetes [ ] thyroid problem  Hematologic/Lymphatic: [ ] negative [ ] anemia [ ] bleeding problem  Allergic/Immunologic: [ ] negative [ ] itchy eyes [ ] nasal discharge [ ] hives [ ] angioedema  [ ] All other systems negative  [ ] Unable to assess ROS because ________    OBJECTIVE:  ICU Vital Signs Last 24 Hrs  T(C): 36.7 (27 Oct 2020 04:32), Max: 36.9 (26 Oct 2020 14:16)  T(F): 98 (27 Oct 2020 04:32), Max: 98.5 (26 Oct 2020 14:16)  HR: 116 (27 Oct 2020 04:32) (95 - 120)  BP: 109/77 (27 Oct 2020 04:32) (99/78 - 116/83)  BP(mean): 90 (26 Oct 2020 18:50) (90 - 90)  ABP: --  ABP(mean): --  RR: 18 (27 Oct 2020 04:32) (16 - 18)  SpO2: 97% (27 Oct 2020 04:32) (95% - 100%)        CAPILLARY BLOOD GLUCOSE          PHYSICAL EXAM:  General:   HEENT:   Lymph Nodes:  Neck:   Respiratory:   Cardiovascular:   Abdomen:   Extremities:   Skin:   Neurological:  Psychiatry:    HOSPITAL MEDICATIONS:  Standing Meds:  ipratropium    for Nebulization 500 MICROGram(s) Nebulizer every 6 hours  pantoprazole    Tablet 40 milliGRAM(s) Oral before breakfast  predniSONE   Tablet 7 milliGRAM(s) Oral daily  rivaroxaban 10 milliGRAM(s) Oral daily      PRN Meds:  ipratropium 17 MICROgram(s) HFA Inhaler 1 Puff(s) Inhalation every 6 hours PRN      LABS:                        10.9   9.06  )-----------( 445      ( 26 Oct 2020 16:14 )             38.2     Hgb Trend: 10.9<--  10-26    134<L>  |  103  |  8   ----------------------------<  74  3.8   |  20<L>  |  1.10    Ca    8.8      26 Oct 2020 16:14    TPro  7.3  /  Alb  4.3  /  TBili  0.6  /  DBili  x   /  AST  15  /  ALT  9<L>  /  AlkPhos  62  10-26    Creatinine Trend: 1.10<--  PT/INR - ( 26 Oct 2020 16:14 )   PT: 14.2 sec;   INR: 1.19 ratio         PTT - ( 26 Oct 2020 16:14 )  PTT:34.5 sec      Venous Blood Gas:  10-26 @ 17:27  7.36/40/37/22/59  VBG Lactate: 2.5      MICROBIOLOGY:       RADIOLOGY:  [ ] Reviewed and interpreted by me    PULMONARY FUNCTION TESTS:    EKG:   CHIEF COMPLAINT: Tachycardia    HPI:  39 yo F PMH 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 Of note, pt last admitted to [facility] in July 2020 for subacute worsening SOB. Discharged with increased remodulin dose to 19. Since then has been followed output with increasing doses and most recently on 23 as of this month.         PAST MEDICAL & SURGICAL HISTORY:  Right heart failure    Corneal disorder    Sinusitis    PE (pulmonary thromboembolism)  on Xarelto 10 mg daily    Asthma  On home O2 nightly at 2L via NC    Pulmonary embolism    Pulmonary hypertension    Anemia    Tachycardia    Pacemaker    History of tubal ligation        FAMILY HISTORY:  FH: anemia    Family history of diabetes mellitus in mother    Family history of diabetes mellitus  in brother        SOCIAL HISTORY:  Smoking: [ ] Never Smoked [ ] Former Smoker (__ packs x ___ years) [ ] Current Smoker  (__ packs x ___ years)  Substance Use: [ ] Never Used [ ] Used ____  EtOH Use:  Marital Status: [ ] Single [ ]  [ ]  [ ]   Sexual History:   Occupation:  Recent Travel:  Country of Birth:  Advance Directives:    Allergies    penicillin (Unknown)  vancomycin (Unknown)    Intolerances    albuterol (Other; Rash)      HOME MEDICATIONS:  Home Medications:  Atrovent HFA 17 mcg/inh inhalation aerosol: puff(s) inhaled , As Needed (26 Oct 2020 18:14)  ipratropium 500 mcg/2.5 mL inhalation solution: 2.5 milliliter(s) inhaled , As Needed (26 Oct 2020 18:14)  omeprazole 40 mg oral delayed release capsule: 1 cap(s) orally once a day (26 Oct 2020 18:14)  predniSONE: 7 milligram(s) orally once a day (26 Oct 2020 18:14)  Remodulin 5 mg/mL injectable solution: Please continue with your remodulin at the recommended dose of 19 ng/kg/min (26 Oct 2020 18:14)  Xarelto 10 mg oral tablet: 1 tab(s) orally once a day (26 Oct 2020 18:14)      REVIEW OF SYSTEMS:  Constitutional: [ ] negative [ ] fevers [ ] chills [ ] weight loss [ ] weight gain  HEENT: [ ] negative [ ] dry eyes [ ] eye irritation [ ] postnasal drip [ ] nasal congestion  CV: [ ] negative  [ ] chest pain [ ] orthopnea [ ] palpitations [ ] murmur  Resp: [ ] negative [ ] cough [ ] shortness of breath [ ] dyspnea [ ] wheezing [ ] sputum [ ] hemoptysis  GI: [ ] negative [ ] nausea [ ] vomiting [ ] diarrhea [ ] constipation [ ] abd pain [ ] dysphagia   : [ ] negative [ ] dysuria [ ] nocturia [ ] hematuria [ ] increased urinary frequency  Musculoskeletal: [ ] negative [ ] back pain [ ] myalgias [ ] arthralgias [ ] fracture  Skin: [ ] negative [ ] rash [ ] itch  Neurological: [ ] negative [ ] headache [ ] dizziness [ ] syncope [ ] weakness [ ] numbness  Psychiatric: [ ] negative [ ] anxiety [ ] depression  Endocrine: [ ] negative [ ] diabetes [ ] thyroid problem  Hematologic/Lymphatic: [ ] negative [ ] anemia [ ] bleeding problem  Allergic/Immunologic: [ ] negative [ ] itchy eyes [ ] nasal discharge [ ] hives [ ] angioedema  [ ] All other systems negative  [ ] Unable to assess ROS because ________    OBJECTIVE:  ICU Vital Signs Last 24 Hrs  T(C): 36.7 (27 Oct 2020 04:32), Max: 36.9 (26 Oct 2020 14:16)  T(F): 98 (27 Oct 2020 04:32), Max: 98.5 (26 Oct 2020 14:16)  HR: 116 (27 Oct 2020 04:32) (95 - 120)  BP: 109/77 (27 Oct 2020 04:32) (99/78 - 116/83)  BP(mean): 90 (26 Oct 2020 18:50) (90 - 90)  ABP: --  ABP(mean): --  RR: 18 (27 Oct 2020 04:32) (16 - 18)  SpO2: 97% (27 Oct 2020 04:32) (95% - 100%)        CAPILLARY BLOOD GLUCOSE          PHYSICAL EXAM:  General:   HEENT:   Lymph Nodes:  Neck:   Respiratory:   Cardiovascular:   Abdomen:   Extremities:   Skin:   Neurological:  Psychiatry:    HOSPITAL MEDICATIONS:  Standing Meds:  ipratropium    for Nebulization 500 MICROGram(s) Nebulizer every 6 hours  pantoprazole    Tablet 40 milliGRAM(s) Oral before breakfast  predniSONE   Tablet 7 milliGRAM(s) Oral daily  rivaroxaban 10 milliGRAM(s) Oral daily      PRN Meds:  ipratropium 17 MICROgram(s) HFA Inhaler 1 Puff(s) Inhalation every 6 hours PRN      LABS:                        10.9   9.06  )-----------( 445      ( 26 Oct 2020 16:14 )             38.2     Hgb Trend: 10.9<--  10-26    134<L>  |  103  |  8   ----------------------------<  74  3.8   |  20<L>  |  1.10    Ca    8.8      26 Oct 2020 16:14    TPro  7.3  /  Alb  4.3  /  TBili  0.6  /  DBili  x   /  AST  15  /  ALT  9<L>  /  AlkPhos  62  10-26    Creatinine Trend: 1.10<--  PT/INR - ( 26 Oct 2020 16:14 )   PT: 14.2 sec;   INR: 1.19 ratio         PTT - ( 26 Oct 2020 16:14 )  PTT:34.5 sec      Venous Blood Gas:  10-26 @ 17:27  7.36/40/37/22/59  VBG Lactate: 2.5      MICROBIOLOGY:       RADIOLOGY:  [ ] Reviewed and interpreted by me    PULMONARY FUNCTION TESTS:    EKG:   CHIEF COMPLAINT: Tachycardia    HPI:  39 yo F PMH 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 Of note, pt last admitted to [facility] in July 2020 for subacute worsening SOB. Discharged with increased remodulin dose to 19. Of note RHC at that time notable for elevated PASP 80 (D40/M57) with elevated PCWP 30. Since then has been followed output with increasing doses and most recently on 23 as of this month.     Reports mild improvement on increasing dose of remodulin. However, since sunday has noted acutely worsening shortness of breath (ET decreased from 1/2 block at slow pace to few steps with significant dyspnea) as well as palpitations and dizziness with exertion/standing. Positional chest pain worse w/ lying on left side. Also reports intermittent abdominal pain with subsequent decrease in PO intake. Denies fevers, chills, vomiting. No recent sick contacts. Discussed symptoms with Dr. Yoon and recommended for inpatient evaluation.     Upon arrival HDS though tachycadic to 110s. Saturations remained >90s on home 2LNC. CTPE obtained negative for PE though with stable GGOs. TTE with e/o RV dilation and subsequent LV compression. Pulmonary consulted for management of pHTN.     PAST MEDICAL & SURGICAL HISTORY:  Right heart failure    Corneal disorder    Sinusitis    PE (pulmonary thromboembolism)  on Xarelto 10 mg daily    Asthma  On home O2 nightly at 2L via NC    Pulmonary embolism    Pulmonary hypertension    Anemia    Tachycardia    Pacemaker    History of tubal ligation        FAMILY HISTORY:  FH: anemia    Family history of diabetes mellitus in mother    Family history of diabetes mellitus  in brother        SOCIAL HISTORY:  Smoking: [ ] Never Smoked [ ] Former Smoker (__ packs x ___ years) [ ] Current Smoker  (__ packs x ___ years)  Substance Use: [ ] Never Used [ ] Used ____  EtOH Use:  Marital Status: [ ] Single [ ]  [ ]  [ ]   Sexual History:   Occupation:  Recent Travel:  Country of Birth:  Advance Directives:    Allergies    penicillin (Unknown)  vancomycin (Unknown)    Intolerances    albuterol (Other; Rash)      HOME MEDICATIONS:  Home Medications:  Atrovent HFA 17 mcg/inh inhalation aerosol: puff(s) inhaled , As Needed (26 Oct 2020 18:14)  ipratropium 500 mcg/2.5 mL inhalation solution: 2.5 milliliter(s) inhaled , As Needed (26 Oct 2020 18:14)  omeprazole 40 mg oral delayed release capsule: 1 cap(s) orally once a day (26 Oct 2020 18:14)  predniSONE: 7 milligram(s) orally once a day (26 Oct 2020 18:14)  Remodulin 5 mg/mL injectable solution: Please continue with your remodulin at the recommended dose of 19 ng/kg/min (26 Oct 2020 18:14)  Xarelto 10 mg oral tablet: 1 tab(s) orally once a day (26 Oct 2020 18:14)      REVIEW OF SYSTEMS:  Constitutional: [x] negative [ ] fevers [ ] chills [ ] weight loss [ ] weight gain  HEENT: [x] negative [ ] dry eyes [ ] eye irritation [ ] postnasal drip [ ] nasal congestion  CV: [ ] negative  [x] chest pain [ ] orthopnea [ ] palpitations [ ] murmur  Resp: [ ] negative [ ] cough [x] shortness of breath [x] dyspnea [ ] wheezing [ ] sputum [ ] hemoptysis  GI: [ ] negative [ ] nausea [ ] vomiting [ ] diarrhea [ ] constipation [x] abd pain [ ] dysphagia   : [x] negative [ ] dysuria [ ] nocturia [ ] hematuria [ ] increased urinary frequency  Musculoskeletal: [ ] negative [ ] back pain [ ] myalgias [ ] arthralgias [ ] fracture  Skin: [ ] negative [ ] rash [ ] itch  Neurological: [ ] negative [ ] headache [ ] dizziness [ ] syncope [ ] weakness [ ] numbness  Psychiatric: [ ] negative [ ] anxiety [ ] depression  Endocrine: [ ] negative [ ] diabetes [ ] thyroid problem  Hematologic/Lymphatic: [ ] negative [ ] anemia [ ] bleeding problem  Allergic/Immunologic: [ ] negative [ ] itchy eyes [ ] nasal discharge [ ] hives [ ] angioedema  [x] All other systems negative  [ ] Unable to assess ROS because ________    OBJECTIVE:  ICU Vital Signs Last 24 Hrs  T(C): 36.7 (27 Oct 2020 04:32), Max: 36.9 (26 Oct 2020 14:16)  T(F): 98 (27 Oct 2020 04:32), Max: 98.5 (26 Oct 2020 14:16)  HR: 116 (27 Oct 2020 04:32) (95 - 120)  BP: 109/77 (27 Oct 2020 04:32) (99/78 - 116/83)  BP(mean): 90 (26 Oct 2020 18:50) (90 - 90)  ABP: --  ABP(mean): --  RR: 18 (27 Oct 2020 04:32) (16 - 18)  SpO2: 97% (27 Oct 2020 04:32) (95% - 100%)        CAPILLARY BLOOD GLUCOSE          PHYSICAL EXAM:  GEN: Middle aged female, uncomfortable  HEENT: EOMI, MMM  CV: tachycardic w/o mrg  PULM: CTAB  ABD: Soft  EXT: WWP, no edema    HOSPITAL MEDICATIONS:  Standing Meds:  ipratropium    for Nebulization 500 MICROGram(s) Nebulizer every 6 hours  pantoprazole    Tablet 40 milliGRAM(s) Oral before breakfast  predniSONE   Tablet 7 milliGRAM(s) Oral daily  rivaroxaban 10 milliGRAM(s) Oral daily      PRN Meds:  ipratropium 17 MICROgram(s) HFA Inhaler 1 Puff(s) Inhalation every 6 hours PRN      LABS:                        10.9   9.06  )-----------( 445      ( 26 Oct 2020 16:14 )             38.2     Hgb Trend: 10.9<--  10-26    134<L>  |  103  |  8   ----------------------------<  74  3.8   |  20<L>  |  1.10    Ca    8.8      26 Oct 2020 16:14    TPro  7.3  /  Alb  4.3  /  TBili  0.6  /  DBili  x   /  AST  15  /  ALT  9<L>  /  AlkPhos  62  10-26    Creatinine Trend: 1.10<--  PT/INR - ( 26 Oct 2020 16:14 )   PT: 14.2 sec;   INR: 1.19 ratio         PTT - ( 26 Oct 2020 16:14 )  PTT:34.5 sec      Venous Blood Gas:  10-26 @ 17:27  7.36/40/37/22/59  VBG Lactate: 2.5      MICROBIOLOGY:       RADIOLOGY:  [ ] Reviewed and interpreted by me    PULMONARY FUNCTION TESTS:    EKG:

## 2020-10-27 NOTE — CONSULT NOTE ADULT - ASSESSMENT
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    - TTE in admission/POCUS c/f LV dysfunction 2/2 RV dilation; suspect tachycardia may be in response from decreased LV output 2/2 RV  - CTPE 10/26 w/o e/o PE  - Would recommend initiation of IV lasix 40mg daily for now, will titrate based on response  - please monitor strict IOs  - IF PATIENT HYPOTENSIVE, PLEASE DO NOT BOLUS FLUIDS AS MAY WORSEN RV DYSFUNCTION LEADING TO DECREASED CARDIAC OUTPUT. CALL MEDICAL ICU.  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    - TTE in admission/POCUS c/f LV dysfunction 2/2 RV dilation; suspect tachycardia may be in response from decreased LV output 2/2 RV  - CTPE 10/26 w/o e/o PE  - Would recommend initiation of IV lasix 40mg daily for now, will titrate based on response  - please monitor strict IOs  - IF PATIENT HYPOTENSIVE, PLEASE DO NOT BOLUS FLUIDS AS MAY WORSEN RV DYSFUNCTION LEADING TO DECREASED CARDIAC OUTPUT. CALL MEDICAL ICU.  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    - TTE in admission/POCUS c/f LV dysfunction 2/2 RV dilation; suspect tachycardia may be in response from decreased LV output 2/2 RV  - CTPE 10/26 w/o e/o PE  - Would recommend initiation of IV lasix 40mg daily for now, will titrate based on response  - please monitor strict IOs  - IF PATIENT HYPOTENSIVE, PLEASE DO NOT BOLUS FLUIDS AS MAY WORSEN RV DYSFUNCTION LEADING TO DECREASED CARDIAC OUTPUT. CALL MEDICAL ICU.  - Avoid positive pressure ventilation (BiPAP/CPAP) if possible, if requires increased O2 trial HFNC  - c/f abdominal pain may be related to low flow states vs. medication, monitor for signs of intraabdominal infection or bleeding  - c/w remodulin 23ng/kg/min  - will discuss w/ outpatient pHTN re: further uptitration of vasodilators    Demarco Ortiz MD  PGY-4  PCCM Fellow  Pager [phone]

## 2020-10-27 NOTE — CONSULT NOTE ADULT - SUBJECTIVE AND OBJECTIVE BOX
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 Followed by Dr. Garcia. The patient states that she was in her usual state of health when she began developing increasing shortness of breath and palpitations over the past [place]. She reports that her palpitations are always in the setting of exertion and associated with difficulty breathing, rarely at rest, has had difficulty ambulating around the house / ambulating to the bathroom in this setting. Went to her outpatient pulmonologists office where she was noted to be dyspneic and tachycardic, referred to the ED for further management.     Upon arrival tachycadic to 110s, saturating well on home 2L. CTPE obtained negative for PE though with stable GGOs. Started on lasix with improvement in sxs although continues to have exertional dyspnea, occasional palpitations with exertion, tele shows episodes of sinus tachycardia - discussed with patient, believes that these correlate with exertion.    PMHx:   Right heart failure  Corneal disorde  Sinusitis  Keratoconus  PE (pulmonary thromboembolism)  Asthma with status asthmaticus, unspecified asthma severit  Sinus tachycardia  Asthma  Pulmonary embolism  Pulmonary hypertension  Anemia  Tachycardia  Pulmonary hypertension  Asthma    PSHx:   Pacemaker  No significant past surgical history  History of tubal ligation    Allergies:  albuterol (Other; Rash)  penicillin (Unknown)  vancomycin (Unknown)    Current Medications:   acetaminophen   Tablet .. 650 milliGRAM(s) Oral every 6 hours PRN  furosemide   Injectable      ipratropium    for Nebulization 500 MICROGram(s) Nebulizer every 6 hours  ipratropium 17 MICROgram(s) HFA Inhaler 1 Puff(s) Inhalation every 6 hours PRN  pantoprazole    Tablet 40 milliGRAM(s) Oral before breakfast  predniSONE   Tablet 7 milliGRAM(s) Oral daily  rivaroxaban 10 milliGRAM(s) Oral daily  Treprostinil (Remodulin) 2.5mg/mL 7 milliGRAM(s) 7 milliGRAM(s) SubCutaneous Continuous Pump    FAMILY HISTORY:  FH: anemia    Family history of diabetes mellitus in mother    Family history of diabetes mellitus  in brother    Physical Exam:  T(F): 97.6 (10-27), Max: 98 (10-27)  HR: 103 (10-27) (103 - 120)  BP: 117/82 (10-) (108/70 - 117/82)  RR: 18 (10-)  SpO2: 100% (10-27)  GENERAL: No acute distress, well-developed  HEAD:  Atraumatic, Normocephalic  ENT: EOMI, PERRLA, conjunctiva and sclera clear  CHEST/LUNG: decreased breath sounds   BACK: No spinal tenderness  HEART: mildly tachy, regular rhythm; No murmurs, rubs, or gallops  ABDOMEN: Soft, Nontender, Nondistended; Bowel sounds present  EXTREMITIES:  No clubbing, cyanosis, trace edema, warm and well perfused   PSYCH: Nl behavior, nl affect  NEUROLOGY: AAOx3, non-focal, cranial nerves intact  SKIN: Normal color, No rashes or lesions    Cardiovascular Diagnostic Testing:    ECG: sinus tach, R axis, RVH w/ repol    Echo:  1. Increased relative wall thickness with normal left  ventricular mass index, consistent with concentric left  ventricular remodeling.  2. Hyperdynamic left ventricular systolic function. Septal  motion consistent with right ventricular overload.  3. The right ventricle is not well visualized; grossly  Right ventricular enlargement with decreased right  ventricular systolic function. TV s' = 7 cm/sec.  4. Estimated right ventricular systolic pressure equals 112  mm Hg, assuming right atrial pressure equals 8 mm Hg,  consistent with severepulmonary hypertension.  *** Compared with echocardiogram of 2019, Pulmonary  hypertension has increased.  --------------------------------------    RHC: Pressures:  - HR: 97  Pressures:  - Rhythm:  Pressures:  -- Aortic Pressure (S/D/M): 90/65/76  Pressures:  -- Left Ventricle (s/edp): 90/4/--  Pressures:  -- Pulmonary Artery (S/D/M): 80/40/57  Pressures:  -- Pulmonary Capillary Wedge: 31/24/22  Pressures:  -- Right Atrium (a/v/M): 4/-1/0  Pressures:  -- Right Ventricle (s/edp): 80/4/--  O2 Sats:  Baseline  O2 Sats:  - HR: 97  O2 Sats:  - Rhythm:  O2 Sats:  -- AO: 12.1/91.1/14.99  O2 Sats:  -- PA: 12.1/51.2/8.43  Outputs:  Baseline  Outputs:  -- CALCULATIONS[age]  Outputs:  -- CALCULATIONS: Body Surface Area: 2.00  Outputs:  -- CALCULATIONS: Height in cm: 170.00  Outputs:  -- CALCULATIONS: Sex: Female  Outputs:  -- CALCULATIONS: Weight in k.90  Outputs:  -- OUTPUTS: Blood Oxygen Difference: 6.57  Outputs:  -- OUTPUTS: CO by Xenia: 4.08  Outputs:  -- OUTPUTS: Xenia cardiac index: 2.04  Outputs:  -- OUTPUTS: O2 consumption: 268.00  Outputs:  -- OUTPUTS: Vo2 Indexed: 133.79  Outputs:  -- RESISTANCES: Left ventricular stroke work: 30.59  Outputs:  -- RESISTANCES: Left Ventricular Stroke Work index: 15.27  Outputs:  -- RESISTANCES: Pulmonary vascular index (dsc): 1373.84  Outputs:  -- RESISTANCES: Pulmonary vascular index (Wood Units): 17.18  Outputs: -- RESISTANCES: Pulmonary vascular resistance (dsc): 685.82  Outputs:  -- RESISTANCES: Pulmonary vascular resistance (Wood Units): 8.57  Outputs:  -- RESISTANCES: PVR_SVR Ratio: 0.46  Outputs:  -- RESISTANCES: Systemic vascular index (dsc): 2983.20  Outputs:  -- RESISTANCES: Systemic vascular index (Wood Units): 37.30  Outputs:  -- RESISTANCES: Systemic vascular resistance (dsc): 1489.21  Outputs:  -- RESISTANCES: Systemic vascular resistance (Wood Units): 18.62  Outputs:  -- RESISTANCES: Total pulmonary index (dsc): 2237.40  Outputs:  -- RESISTANCES: Total pulmonary index (Wood Units): 27.97  Outputs:  -- RESISTANCES: Total pulmonary resistance (dsc): 1116.91  Outputs:  -- RESISTANCES: Total pulmonary resistance (Wood Units): 13.96  Outputs:  -- RESISTANCES: Total vascular index (Wood Units): 37.30  Outputs:  -- RESISTANCES: Total vascular resistance (dsc): 1489.21  Outputs:  -- RESISTANCES: Total vascular resistance (Wood Units): 18.62  Outputs:  -- RESISTANCES: Total vascular resistance index (dsc): 2983.20  Outputs:  -- RESISTANCES: TPR_TVR Ratio: 0.75  Outputs:  -- SHUNTS: Qs Indexed: 2.04  Outputs:  -- SHUNTS: Systemic flow: 4.08    CT Chest: No pleural effusion.  Mosaic groundglass opacities are unchanged likely related to pulmonary hypertension.    Labs: Personally reviewed                        10.9   9.06  )-----------( 445      ( 26 Oct 2020 16:14 )             38.2     10-26    134<L>  |  103  |  8   ----------------------------<  74  3.8   |  20<L>  |  1.10    Ca    8.8      26 Oct 2020 16:14    TPro  7.3  /  Alb  4.3  /  TBili  0.6  /  DBili  x   /  AST  15  /  ALT  9<L>  /  AlkPhos  62  10-26    PT/INR - ( 26 Oct 2020 16:14 )   PT: 14.2 sec;   INR: 1.19 ratio         PTT - ( 26 Oct 2020 16:14 )  PTT:34.5 sec    CARDIAC MARKERS ( 26 Oct 2020 16:14 )  7 ng/L / x     / x     / x     / x     / x        Serum Pro-Brain Natriuretic Peptide: 2492 pg/mL (10-26 @ 16:14)      36 F with pmhx PE on coumadin, pulmonary HTN (PASP 80, primarily Group 1), 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     - palpitations related to exertion, appear to be sinus in etiology, likely related to underlying pulmonary hypertension, can continue to monitor on telemetry, would not treat heart rate aggressively   - appears to be responding well to gentle diuresis  - remodulin per pulm recs   - please discuss patient with heart failure team, patient of Dr. 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 Followed by Dr. Garcia. The patient states that she was in her usual state of health when she began developing increasing shortness of breath and palpitations over the past week. She reports that her palpitations are always in the setting of exertion and associated with difficulty breathing, rarely at rest, has had difficulty ambulating around the house / ambulating to the bathroom in this setting. Went to her outpatient pulmonologists office where she was noted to be dyspneic and tachycardic, referred to the ED for further management.     Upon arrival tachycadic to 110s, saturating well on home 2L. CTPE obtained negative for PE though with stable GGOs. Started on lasix with improvement in sxs although continues to have exertional dyspnea, occasional palpitations with exertion, tele shows episodes of sinus tachycardia - discussed with patient, believes that these correlate with exertion.    PMHx:   Right heart failure  Corneal disorde  Sinusitis  Keratoconus  PE (pulmonary thromboembolism)  Asthma with status asthmaticus, unspecified asthma severit  Sinus tachycardia  Asthma  Pulmonary embolism  Pulmonary hypertension  Anemia  Tachycardia  Pulmonary hypertension  Asthma    PSHx:   Pacemaker  No significant past surgical history  History of tubal ligation    Allergies:  albuterol (Other; Rash)  penicillin (Unknown)  vancomycin (Unknown)    Current Medications:   acetaminophen   Tablet .. 650 milliGRAM(s) Oral every 6 hours PRN  furosemide   Injectable      ipratropium    for Nebulization 500 MICROGram(s) Nebulizer every 6 hours  ipratropium 17 MICROgram(s) HFA Inhaler 1 Puff(s) Inhalation every 6 hours PRN  pantoprazole    Tablet 40 milliGRAM(s) Oral before breakfast  predniSONE   Tablet 7 milliGRAM(s) Oral daily  rivaroxaban 10 milliGRAM(s) Oral daily  Treprostinil (Remodulin) 2.5mg/mL 7 milliGRAM(s) 7 milliGRAM(s) SubCutaneous Continuous Pump    FAMILY HISTORY:  FH: anemia    Family history of diabetes mellitus in mother    Family history of diabetes mellitus  in brother    Physical Exam:  T(F): 97.6 (10-27), Max: 98 (10-27)  HR: 103 (10-27) (103 - 120)  BP: 117/82 (10-) (108/70 - 117/82)  RR: 18 (10-)  SpO2: 100% (10-27)  GENERAL: No acute distress, well-developed  HEAD:  Atraumatic, Normocephalic  ENT: EOMI, PERRLA, conjunctiva and sclera clear  CHEST/LUNG: decreased breath sounds   BACK: No spinal tenderness  HEART: mildly tachy, regular rhythm; No murmurs, rubs, or gallops  ABDOMEN: Soft, Nontender, Nondistended; Bowel sounds present  EXTREMITIES:  No clubbing, cyanosis, trace edema, warm and well perfused   PSYCH: Nl behavior, nl affect  NEUROLOGY: AAOx3, non-focal, cranial nerves intact  SKIN: Normal color, No rashes or lesions    Cardiovascular Diagnostic Testing:    ECG: sinus tach, R axis, RVH w/ repol    Echo:  1. Increased relative wall thickness with normal left  ventricular mass index, consistent with concentric left  ventricular remodeling.  2. Hyperdynamic left ventricular systolic function. Septal  motion consistent with right ventricular overload.  3. The right ventricle is not well visualized; grossly  Right ventricular enlargement with decreased right  ventricular systolic function. TV s' = 7 cm/sec.  4. Estimated right ventricular systolic pressure equals 112  mm Hg, assuming right atrial pressure equals 8 mm Hg,  consistent with severepulmonary hypertension.  *** Compared with echocardiogram of 2019, Pulmonary  hypertension has increased.  --------------------------------------    RHC: Pressures:  - HR: 97  Pressures:  - Rhythm:  Pressures:  -- Aortic Pressure (S/D/M): 90/65/76  Pressures:  -- Left Ventricle (s/edp): 90/4/--  Pressures:  -- Pulmonary Artery (S/D/M): 80/40/57  Pressures:  -- Pulmonary Capillary Wedge: 31/24/22  Pressures:  -- Right Atrium (a/v/M): 4/-1/0  Pressures:  -- Right Ventricle (s/edp): 80/4/--  O2 Sats:  Baseline  O2 Sats:  - HR: 97  O2 Sats:  - Rhythm:  O2 Sats:  -- AO: 12.1/91.1/14.99  O2 Sats:  -- PA: 12.1/51.2/8.43  Outputs:  Baseline  Outputs:  -- CALCULATIONS: [age]  Outputs:  -- CALCULATIONS: Body Surface Area: 2.00  Outputs:  -- CALCULATIONS: Height in cm: 170.00  Outputs:  -- CALCULATIONS: Sex: Female  Outputs:  -- CALCULATIONS: Weight in k.90  Outputs:  -- OUTPUTS: Blood Oxygen Difference: 6.57  Outputs:  -- OUTPUTS: CO by Xenia: 4.08  Outputs:  -- OUTPUTS: Xenia cardiac index: 2.04  Outputs:  -- OUTPUTS: O2 consumption: 268.00  Outputs:  -- OUTPUTS: Vo2 Indexed: 133.79  Outputs:  -- RESISTANCES: Left ventricular stroke work: 30.59  Outputs:  -- RESISTANCES: Left Ventricular Stroke Work index: 15.27  Outputs:  -- RESISTANCES: Pulmonary vascular index (dsc): 1373.84  Outputs:  -- RESISTANCES: Pulmonary vascular index (Wood Units): 17.18  Outputs: -- RESISTANCES: Pulmonary vascular resistance (dsc): 685.82  Outputs:  -- RESISTANCES: Pulmonary vascular resistance (Wood Units): 8.57  Outputs:  -- RESISTANCES: PVR_SVR Ratio: 0.46  Outputs:  -- RESISTANCES: Systemic vascular index (dsc): 2983.20  Outputs:  -- RESISTANCES: Systemic vascular index (Wood Units): 37.30  Outputs:  -- RESISTANCES: Systemic vascular resistance (dsc): 1489.21  Outputs:  -- RESISTANCES: Systemic vascular resistance (Wood Units): 18.62  Outputs:  -- RESISTANCES: Total pulmonary index (dsc): 2237.40  Outputs:  -- RESISTANCES: Total pulmonary index (Wood Units): 27.97  Outputs:  -- RESISTANCES: Total pulmonary resistance (dsc): 1116.91  Outputs:  -- RESISTANCES: Total pulmonary resistance (Wood Units): 13.96  Outputs:  -- RESISTANCES: Total vascular index (Wood Units): 37.30  Outputs:  -- RESISTANCES: Total vascular resistance (dsc): 1489.21  Outputs:  -- RESISTANCES: Total vascular resistance (Wood Units): 18.62  Outputs:  -- RESISTANCES: Total vascular resistance index (dsc): 2983.20  Outputs:  -- RESISTANCES: TPR_TVR Ratio: 0.75  Outputs:  -- SHUNTS: Qs Indexed: 2.04  Outputs:  -- SHUNTS: Systemic flow: 4.08    CT Chest: No pleural effusion.  Mosaic groundglass opacities are unchanged likely related to pulmonary hypertension.    Labs: Personally reviewed                        10.9   9.06  )-----------( 445      ( 26 Oct 2020 16:14 )             38.2     10-26    134<L>  |  103  |  8   ----------------------------<  74  3.8   |  20<L>  |  1.10    Ca    8.8      26 Oct 2020 16:14    TPro  7.3  /  Alb  4.3  /  TBili  0.6  /  DBili  x   /  AST  15  /  ALT  9<L>  /  AlkPhos  62  10-26    PT/INR - ( 26 Oct 2020 16:14 )   PT: 14.2 sec;   INR: 1.19 ratio         PTT - ( 26 Oct 2020 16:14 )  PTT:34.5 sec    CARDIAC MARKERS ( 26 Oct 2020 16:14 )  7 ng/L / x     / x     / x     / x     / x        Serum Pro-Brain Natriuretic Peptide: 2492 pg/mL (10-26 @ 16:14)      36 F with pmhx PE on coumadin, pulmonary HTN (PASP 80, primarily Group 1), 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     - palpitations related to exertion, appear to be sinus in etiology, likely related to underlying pulmonary hypertension, can continue to monitor on telemetry, would not treat heart rate aggressively   - appears to be responding well to gentle diuresis  - remodulin per pulm recs   - please discuss patient with heart failure team, patient of Dr. Garcia, aware that patient has been admitted  - patient is currently normotensive, well appearing, HR 90s at rest, warm and well perfused   - consider repeat TTE   - will defer management of ongoing severe pulmonary hypertension to pulmonary team and heart failure service

## 2020-10-27 NOTE — PROGRESS NOTE ADULT - PROBLEM SELECTOR PLAN 3
s/p RHC 7/23- RA 4, RV 80/4 PA 80/40 57 PCWP 22 (24) PA 51, A0 91 Xenia 4.1/2.0   PVR wedge 8.7, PVR LVEDP 13.3 severe pulmonary HTN   Resume Lasix. Cards and Pulm following.

## 2020-10-28 LAB
ANION GAP SERPL CALC-SCNC: 15 MMOL/L — SIGNIFICANT CHANGE UP (ref 5–17)
BUN SERPL-MCNC: 13 MG/DL — SIGNIFICANT CHANGE UP (ref 7–23)
CALCIUM SERPL-MCNC: 9 MG/DL — SIGNIFICANT CHANGE UP (ref 8.4–10.5)
CHLORIDE SERPL-SCNC: 102 MMOL/L — SIGNIFICANT CHANGE UP (ref 96–108)
CO2 SERPL-SCNC: 20 MMOL/L — LOW (ref 22–31)
CREAT SERPL-MCNC: 1.04 MG/DL — SIGNIFICANT CHANGE UP (ref 0.5–1.3)
GLUCOSE SERPL-MCNC: 104 MG/DL — HIGH (ref 70–99)
HCT VFR BLD CALC: 43.8 % — SIGNIFICANT CHANGE UP (ref 34.5–45)
HGB BLD-MCNC: 12.9 G/DL — SIGNIFICANT CHANGE UP (ref 11.5–15.5)
MCHC RBC-ENTMCNC: 18 PG — LOW (ref 27–34)
MCHC RBC-ENTMCNC: 29.5 GM/DL — LOW (ref 32–36)
MCV RBC AUTO: 61.3 FL — LOW (ref 80–100)
NRBC # BLD: 0 /100 WBCS — SIGNIFICANT CHANGE UP (ref 0–0)
PLATELET # BLD AUTO: 545 K/UL — HIGH (ref 150–400)
POTASSIUM SERPL-MCNC: 4 MMOL/L — SIGNIFICANT CHANGE UP (ref 3.5–5.3)
POTASSIUM SERPL-SCNC: 4 MMOL/L — SIGNIFICANT CHANGE UP (ref 3.5–5.3)
RBC # BLD: 7.15 M/UL — HIGH (ref 3.8–5.2)
RBC # FLD: 22.3 % — HIGH (ref 10.3–14.5)
SARS-COV-2 IGG SERPL QL IA: NEGATIVE — SIGNIFICANT CHANGE UP
SARS-COV-2 IGM SERPL IA-ACNC: <3.8 AU/ML — SIGNIFICANT CHANGE UP
SODIUM SERPL-SCNC: 137 MMOL/L — SIGNIFICANT CHANGE UP (ref 135–145)
WBC # BLD: 14.18 K/UL — HIGH (ref 3.8–10.5)
WBC # FLD AUTO: 14.18 K/UL — HIGH (ref 3.8–10.5)

## 2020-10-28 PROCEDURE — 99233 SBSQ HOSP IP/OBS HIGH 50: CPT | Mod: GC

## 2020-10-28 RX ADMIN — Medication 7 MILLIGRAM(S): at 05:26

## 2020-10-28 RX ADMIN — Medication 40 MILLIGRAM(S): at 05:25

## 2020-10-28 RX ADMIN — RIVAROXABAN 10 MILLIGRAM(S): KIT at 12:18

## 2020-10-28 RX ADMIN — PANTOPRAZOLE SODIUM 40 MILLIGRAM(S): 20 TABLET, DELAYED RELEASE ORAL at 05:26

## 2020-10-28 RX ADMIN — Medication 500 MICROGRAM(S): at 12:19

## 2020-10-28 RX ADMIN — Medication 500 MICROGRAM(S): at 17:28

## 2020-10-28 RX ADMIN — Medication 500 MICROGRAM(S): at 06:31

## 2020-10-28 NOTE — PROGRESS NOTE ADULT - SUBJECTIVE AND OBJECTIVE BOX
Patient is a 40y old  Female who presents with a chief complaint of Referred by Pulmonologist for tachycardia (27 Oct 2020 18:54)      SUBJECTIVE / OVERNIGHT EVENTS: No events over night.   ROS otherwise negative.     T(C): 36.4 (10-28-20 @ 11:35), Max: 36.4 (10-28-20 @ 11:35)  HR: 81 (10-28-20 @ 11:35) (81 - 81)  BP: 97/66 (10-28-20 @ 11:35) (97/66 - 97/66)  RR: 18 (10-28-20 @ 11:35) (18 - 18)  SpO2: 99% (10-28-20 @ 11:35) (99% - 99%)    MEDICATIONS  (STANDING):  furosemide   Injectable      furosemide   Injectable 40 milliGRAM(s) IV Push daily  ipratropium    for Nebulization 500 MICROGram(s) Nebulizer every 6 hours  pantoprazole    Tablet 40 milliGRAM(s) Oral before breakfast  predniSONE   Tablet 7 milliGRAM(s) Oral daily  rivaroxaban 10 milliGRAM(s) Oral daily  Treprostinil (Remodulin) 2.5mg/mL 7 milliGRAM(s) 7 milliGRAM(s) SubCutaneous Continuous Pump    MEDICATIONS  (PRN):  acetaminophen   Tablet .. 650 milliGRAM(s) Oral every 6 hours PRN Mild Pain (1 - 3)  ipratropium 17 MICROgram(s) HFA Inhaler 1 Puff(s) Inhalation every 6 hours PRN SOB    PHYSICAL EXAM:  GENERAL: NAD, well-developed  HEAD:  Atraumatic, Normocephalic  EYES: EOMI, conjunctiva and sclera clear  NECK: Supple, No JVD  CHEST/LUNG: Crackles at bases, no wheeze  HEART: Regular rate and rhythm; No murmurs, rubs, or gallops  ABDOMEN: Soft, Nontender, Nondistended; Bowel sounds present  EXTREMITIES:  2+ Peripheral Pulses, No clubbing, cyanosis, or edema  PSYCH: AAOx3  NEUROLOGY: non-focal  SKIN: No rashes or lesions                                                   12.9   14.18 )-----------( 545      ( 28 Oct 2020 10:30 )             43.8               137|102|13<104  4.0|20|1.04  9.0,--,--  10-28 @ 10:30          RADIOLOGY & ADDITIONAL TESTS:    Imaging Personally Reviewed:    Consultant(s) Notes Reviewed:      Care Discussed with Consultants/Other Providers:

## 2020-10-28 NOTE — PROGRESS NOTE ADULT - ASSESSMENT
39 yo F PMH WHO class I (possible component of class IV) pHTN w/ history of PE (previously on riociguat, currently on remodulin) on persistent 2LNC, PE on half dose xarelto iso anemia, bradycardia s/p PPM ([place], 2016), 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    - TTE in admission/POCUS c/f LV dysfunction 2/2 RV dilation  - CTPE 10/26 w/o e/o PE  - C/w IV lasix 40mg daily, please maintain strict IOs  - Avoid aggressive resuscitation as may worsen LV dysfunction iso RV overload, if hypotensive please call Medical ICU  - Avoid positive pressure ventilation (BiPAP/CPAP) if possible, if requires increased O2 trial HFNC  - c/f abdominal pain may be related to low flow states vs. medication, monitor for signs of intraabdominal infectio  - c/w remodulin 23ng/kg/min  - will discuss w/ outpatient pHTN re: further uptitration of vasodilators    Demarco Ortiz MD  PGY-4  PCCM Fellow  Pager [phone] 39 yo F PMH WHO class I (possible component of class IV) pHTN w/ history of PE (previously on riociguat, currently on remodulin) on persistent 2LNC, PE on half dose xarelto iso anemia, bradycardia s/p PPM ([place], 2016), 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    - TTE in admission/POCUS c/f LV dysfunction 2/2 RV dilation  - CTPE 10/26 w/o e/o PE  - C/w IV lasix 40mg daily, please maintain strict IOs  - Avoid aggressive resuscitation as may worsen LV dysfunction iso RV overload, if hypotensive please call Medical ICU  - Avoid positive pressure ventilation (BiPAP/CPAP) if possible, if requires increased O2 trial HFNC  - c/f abdominal pain may be related to low flow states vs. medication, monitor for signs of intraabdominal infectio  - c/w remodulin 23ng/kg/min  - will discuss w/ outpatient pHTN re: further uptitration of vasodilators  - please check labs/lytes as patient being actively diuresed    Demarco Ortiz MD  PGY-4  PCCM Fellow  Pager [phone]

## 2020-10-28 NOTE — PROGRESS NOTE ADULT - PROBLEM SELECTOR PLAN 2
Last Echo 07/21/20- Severe pulmonary HTN with decreased RV function.   Continue with Oxygen, patient off Lasix as per pulmonary.  Continue with Remodulin.

## 2020-10-28 NOTE — PROGRESS NOTE ADULT - SUBJECTIVE AND OBJECTIVE BOX
Patient seen and examined at bedside.    Overnight Events: No acute events overnight, patient reporting improvement in shortness of breath and palpitations, now significantly better than on admission. Rates remain 90s to 100s, occasional 130s, sinus. Still exertional shortness of breath.     Current Meds:  acetaminophen   Tablet .. 650 milliGRAM(s) Oral every 6 hours PRN  furosemide   Injectable      furosemide   Injectable 40 milliGRAM(s) IV Push daily  ipratropium    for Nebulization 500 MICROGram(s) Nebulizer every 6 hours  ipratropium 17 MICROgram(s) HFA Inhaler 1 Puff(s) Inhalation every 6 hours PRN  pantoprazole    Tablet 40 milliGRAM(s) Oral before breakfast  predniSONE   Tablet 7 milliGRAM(s) Oral daily  rivaroxaban 10 milliGRAM(s) Oral daily  Treprostinil (Remodulin) 2.5mg/mL 7 milliGRAM(s) 7 milliGRAM(s) SubCutaneous Continuous Pump      Vitals:  T(F): 97.8 (10-28), Max: 97.8 (10-28)  HR: 103 (10-28) (103 - 111)  BP: 102/77 (10-28) (102/77 - 117/82)  RR: 18 (10-28)  SpO2: 95% (10-28)  I&O's Summary    27 Oct 2020 07:01  -  28 Oct 2020 07:00  --------------------------------------------------------  IN: 640 mL / OUT: 1100 mL / NET: -460 mL    Physical Exam:  Appearance: No acute distress; well appearing  Eyes: PERRL, EOMI, pink conjunctiva  HEENT: Normal oral mucosa  Cardiovascular: RRR, S1, S2, no murmurs, rubs, or gallops; no edema; no JVD  Respiratory: Clear to auscultation bilaterally  Gastrointestinal: soft, non-tender, non-distended with normal bowel sounds  Musculoskeletal: No clubbing; no joint deformity   Neurologic: Non-focal  Lymphatic: No lymphadenopathy  Psychiatry: AAOx3, mood & affect appropriate  Skin: No rashes, ecchymoses, or cyanosis                          10.9   9.06  )-----------( 445      ( 26 Oct 2020 16:14 )             38.2     10-26    134<L>  |  103  |  8   ----------------------------<  74  3.8   |  20<L>  |  1.10    Ca    8.8      26 Oct 2020 16:14    TPro  7.3  /  Alb  4.3  /  TBili  0.6  /  DBili  x   /  AST  15  /  ALT  9<L>  /  AlkPhos  62  10-26    PT/INR - ( 26 Oct 2020 16:14 )   PT: 14.2 sec;   INR: 1.19 ratio         PTT - ( 26 Oct 2020 16:14 )  PTT:34.5 sec  CARDIAC MARKERS ( 26 Oct 2020 16:14 )  7 ng/L / x     / x     / x     / x     / x          Serum Pro-Brain Natriuretic Peptide: 2492 pg/mL (10-26 @ 16:14)    1. Increased relative wall thickness with normal left  ventricular mass index, consistent with concentric left  ventricular remodeling.  2. Hyperdynamic left ventricular systolic function. Septal  motion consistent with right ventricular overload.  3. The right ventricle is not well visualized; grossly  Right ventricular enlargement with decreased right  ventricular systolic function. TV s' = 7 cm/sec.  4. Estimated right ventricular systolic pressure equals 112  mm Hg, assuming right atrial pressure equals 8 mm Hg,  consistent with severepulmonary hypertension.  *** Compared with echocardiogram of 2019, Pulmonary  hypertension has increased.      RHC: Pressures:  - HR: 97  Pressures:  - Rhythm:  Pressures:  -- Aortic Pressure (S/D/M): 90/65/76  Pressures:  -- Left Ventricle (s/edp): 90/4/--  Pressures:  -- Pulmonary Artery (S/D/M): 80/40/57  Pressures:  -- Pulmonary Capillary Wedge: 31/24/22  Pressures:  -- Right Atrium (a/v/M): 4/-1/0  Pressures:  -- Right Ventricle (s/edp): 80/4/--  O2 Sats:  Baseline  O2 Sats:  - HR: 97  O2 Sats:  - Rhythm:  O2 Sats:  -- AO: 12.1/91.1/14.99  O2 Sats:  -- PA: 12.1/51.2/8.43  Outputs:  Baseline  Outputs:  -- CALCULATIONS[age]  Outputs:  -- CALCULATIONS: Body Surface Area: 2.00  Outputs:  -- CALCULATIONS: Height in cm: 170.00  Outputs:  -- CALCULATIONS: Sex: Female  Outputs:  -- CALCULATIONS: Weight in k.90  Outputs:  -- OUTPUTS: Blood Oxygen Difference: 6.57  Outputs:  -- OUTPUTS: CO by Xenia: 4.08  Outputs:  -- OUTPUTS: Xenia cardiac index: 2.04  Outputs:  -- OUTPUTS: O2 consumption: 268.00  Outputs:  -- OUTPUTS: Vo2 Indexed: 133.79  Outputs:  -- RESISTANCES: Left ventricular stroke work: 30.59  Outputs:  -- RESISTANCES: Left Ventricular Stroke Work index: 15.27  Outputs:  -- RESISTANCES: Pulmonary vascular index (dsc): 1373.84  Outputs:  -- RESISTANCES: Pulmonary vascular index (Wood Units): 17.18  Outputs: -- RESISTANCES: Pulmonary vascular resistance (dsc): 685.82  Outputs:  -- RESISTANCES: Pulmonary vascular resistance (Wood Units): 8.57  Outputs:  -- RESISTANCES: PVR_SVR Ratio: 0.46  Outputs:  -- RESISTANCES: Systemic vascular index (dsc): 2983.20  Outputs:  -- RESISTANCES: Systemic vascular index (Wood Units): 37.30  Outputs:  -- RESISTANCES: Systemic vascular resistance (dsc): 1489.21  Outputs:  -- RESISTANCES: Systemic vascular resistance (Wood Units): 18.62  Outputs:  -- RESISTANCES: Total pulmonary index (dsc): 2237.40  Outputs:  -- RESISTANCES: Total pulmonary index (Wood Units): 27.97  Outputs:  -- RESISTANCES: Total pulmonary resistance (dsc): 1116.91  Outputs:  -- RESISTANCES: Total pulmonary resistance (Wood Units): 13.96  Outputs:  -- RESISTANCES: Total vascular index (Wood Units): 37.30  Outputs:  -- RESISTANCES: Total vascular resistance (dsc): 1489.21  Outputs:  -- RESISTANCES: Total vascular resistance (Wood Units): 18.62  Outputs:  -- RESISTANCES: Total vascular resistance index (dsc): 2983.20  Outputs:  -- RESISTANCES: TPR_TVR Ratio: 0.75  Outputs:  -- SHUNTS: Qs Indexed: 2.04  Outputs:  -- SHUNTS: Systemic flow: 4.08    36 F with pmhx PE on coumadin, pulmonary HTN (PASP 80, primarily Group 1), 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     - palpitations related to exertion, appear to be sinus in etiology, likely related to underlying pulmonary hypertension, can continue to monitor on telemetry, would not treat heart rate aggressively, now improving   - appears to be responding well to gentle diuresis, can discuss with heart failure team today  - remodulin per pulm recs   - patient is currently normotensive, well appearing, HR low 100s at rest, warm and well perfused   - consider repeat TTE   - will defer management of ongoing severe pulmonary hypertension to pulmonary team and heart failure service

## 2020-10-28 NOTE — PROGRESS NOTE ADULT - SUBJECTIVE AND OBJECTIVE BOX
CHIEF COMPLAINT:    Interval Events:  Reports significant improvement in breathing. S/p 40mg IV lasix yesterday with recorded net negative of ~460cc. Able to ambulate further without shortness of breath/dyspnea. Still with some intermittent abdominal pain similar to prior when she experiences episodes of SOB. Intermittent non-bloody diarrhea.     REVIEW OF SYSTEMS:  Constitutional: [x] negative [ ] fevers [ ] chills [ ] weight loss [ ] weight gain  HEENT: [x] negative [ ] dry eyes [ ] eye irritation [ ] postnasal drip [ ] nasal congestion  CV: [x] negative  [ ] chest pain [ ] orthopnea [ ] palpitations [ ] murmur  Resp: [ ] negative [ ] cough [x] shortness of breath [x] dyspnea [ ] wheezing [ ] sputum [ ] hemoptysis  GI: [x] negative [ ] nausea [ ] vomiting [ ] diarrhea [ ] constipation [ ] abd pain [ ] dysphagia   : [x] negative [ ] dysuria [ ] nocturia [ ] hematuria [ ] increased urinary frequency  Musculoskeletal: [ ] negative [ ] back pain [ ] myalgias [ ] arthralgias [ ] fracture  Skin: [ ] negative [ ] rash [ ] itch  Neurological: [ ] negative [ ] headache [ ] dizziness [ ] syncope [ ] weakness [ ] numbness  Psychiatric: [ ] negative [ ] anxiety [ ] depression  Endocrine: [ ] negative [ ] diabetes [ ] thyroid problem  Hematologic/Lymphatic: [ ] negative [ ] anemia [ ] bleeding problem  Allergic/Immunologic: [ ] negative [ ] itchy eyes [ ] nasal discharge [ ] hives [ ] angioedema  [x] All other systems negative  [ ] Unable to assess ROS because ________    OBJECTIVE:  ICU Vital Signs Last 24 Hrs  T(C): 36.6 (28 Oct 2020 04:47), Max: 36.6 (28 Oct 2020 04:47)  T(F): 97.8 (28 Oct 2020 04:47), Max: 97.8 (28 Oct 2020 04:47)  HR: 103 (28 Oct 2020 04:47) (103 - 111)  BP: 102/77 (28 Oct 2020 04:47) (102/77 - 117/82)  BP(mean): --  ABP: --  ABP(mean): --  RR: 18 (28 Oct 2020 04:47) (18 - 18)  SpO2: 95% (28 Oct 2020 04:47) (95% - 100%)        10-27 @ 07:01  -  10-28 @ 07:00  --------------------------------------------------------  IN: 640 mL / OUT: 1100 mL / NET: -460 mL      CAPILLARY BLOOD GLUCOSE          PHYSICAL EXAM:  GEN: Middle aged female, NAD  HEENT: EOMI, MMM  CV: RRR  PULM: CTAB  ABD: Soft  EXT: WWP, no edema    HOSPITAL MEDICATIONS:  MEDICATIONS  (STANDING):  furosemide   Injectable      furosemide   Injectable 40 milliGRAM(s) IV Push daily  ipratropium    for Nebulization 500 MICROGram(s) Nebulizer every 6 hours  pantoprazole    Tablet 40 milliGRAM(s) Oral before breakfast  predniSONE   Tablet 7 milliGRAM(s) Oral daily  rivaroxaban 10 milliGRAM(s) Oral daily  Treprostinil (Remodulin) 2.5mg/mL 7 milliGRAM(s) 7 milliGRAM(s) SubCutaneous Continuous Pump    MEDICATIONS  (PRN):  acetaminophen   Tablet .. 650 milliGRAM(s) Oral every 6 hours PRN Mild Pain (1 - 3)  ipratropium 17 MICROgram(s) HFA Inhaler 1 Puff(s) Inhalation every 6 hours PRN SOB      LABS:                        10.9   9.06  )-----------( 445      ( 26 Oct 2020 16:14 )             38.2     Hgb Trend: 10.9<--  10-26    134<L>  |  103  |  8   ----------------------------<  74  3.8   |  20<L>  |  1.10    Ca    8.8      26 Oct 2020 16:14    TPro  7.3  /  Alb  4.3  /  TBili  0.6  /  DBili  x   /  AST  15  /  ALT  9<L>  /  AlkPhos  62  10-26    Creatinine Trend: 1.10<--  PT/INR - ( 26 Oct 2020 16:14 )   PT: 14.2 sec;   INR: 1.19 ratio         PTT - ( 26 Oct 2020 16:14 )  PTT:34.5 sec      Venous Blood Gas:  10-26 @ 17:27  7.36/40/37/22/59  VBG Lactate: 2.5      MICROBIOLOGY:       RADIOLOGY:  [ ] Reviewed and interpreted by me    PULMONARY FUNCTION TESTS:    EKG:

## 2020-10-29 LAB
ANION GAP SERPL CALC-SCNC: 12 MMOL/L — SIGNIFICANT CHANGE UP (ref 5–17)
BUN SERPL-MCNC: 11 MG/DL — SIGNIFICANT CHANGE UP (ref 7–23)
CALCIUM SERPL-MCNC: 8.5 MG/DL — SIGNIFICANT CHANGE UP (ref 8.4–10.5)
CHLORIDE SERPL-SCNC: 101 MMOL/L — SIGNIFICANT CHANGE UP (ref 96–108)
CO2 SERPL-SCNC: 23 MMOL/L — SIGNIFICANT CHANGE UP (ref 22–31)
CREAT SERPL-MCNC: 1.05 MG/DL — SIGNIFICANT CHANGE UP (ref 0.5–1.3)
GLUCOSE SERPL-MCNC: 107 MG/DL — HIGH (ref 70–99)
HCT VFR BLD CALC: 42.8 % — SIGNIFICANT CHANGE UP (ref 34.5–45)
HGB BLD-MCNC: 12.3 G/DL — SIGNIFICANT CHANGE UP (ref 11.5–15.5)
MCHC RBC-ENTMCNC: 18.1 PG — LOW (ref 27–34)
MCHC RBC-ENTMCNC: 28.7 GM/DL — LOW (ref 32–36)
MCV RBC AUTO: 62.8 FL — LOW (ref 80–100)
NRBC # BLD: 0 /100 WBCS — SIGNIFICANT CHANGE UP (ref 0–0)
PLATELET # BLD AUTO: 481 K/UL — HIGH (ref 150–400)
POTASSIUM SERPL-MCNC: 3.8 MMOL/L — SIGNIFICANT CHANGE UP (ref 3.5–5.3)
POTASSIUM SERPL-SCNC: 3.8 MMOL/L — SIGNIFICANT CHANGE UP (ref 3.5–5.3)
RBC # BLD: 6.81 M/UL — HIGH (ref 3.8–5.2)
RBC # FLD: 22.1 % — HIGH (ref 10.3–14.5)
SODIUM SERPL-SCNC: 136 MMOL/L — SIGNIFICANT CHANGE UP (ref 135–145)
WBC # BLD: 11.89 K/UL — HIGH (ref 3.8–10.5)
WBC # FLD AUTO: 11.89 K/UL — HIGH (ref 3.8–10.5)

## 2020-10-29 PROCEDURE — 78597 LUNG PERFUSION DIFFERENTIAL: CPT | Mod: 26

## 2020-10-29 PROCEDURE — 99223 1ST HOSP IP/OBS HIGH 75: CPT

## 2020-10-29 PROCEDURE — 99233 SBSQ HOSP IP/OBS HIGH 50: CPT | Mod: GC

## 2020-10-29 RX ORDER — HYDROXYZINE HCL 10 MG
25 TABLET ORAL DAILY
Refills: 0 | Status: DISCONTINUED | OUTPATIENT
Start: 2020-10-29 | End: 2020-10-31

## 2020-10-29 RX ORDER — SPIRONOLACTONE 25 MG/1
25 TABLET, FILM COATED ORAL DAILY
Refills: 0 | Status: DISCONTINUED | OUTPATIENT
Start: 2020-10-29 | End: 2020-11-05

## 2020-10-29 RX ORDER — FUROSEMIDE 40 MG
40 TABLET ORAL DAILY
Refills: 0 | Status: DISCONTINUED | OUTPATIENT
Start: 2020-10-29 | End: 2020-11-05

## 2020-10-29 RX ADMIN — Medication 500 MICROGRAM(S): at 00:33

## 2020-10-29 RX ADMIN — Medication 500 MICROGRAM(S): at 17:34

## 2020-10-29 RX ADMIN — Medication 7 MILLIGRAM(S): at 05:05

## 2020-10-29 RX ADMIN — Medication 25 MILLIGRAM(S): at 17:37

## 2020-10-29 RX ADMIN — RIVAROXABAN 10 MILLIGRAM(S): KIT at 17:32

## 2020-10-29 RX ADMIN — Medication 500 MICROGRAM(S): at 05:04

## 2020-10-29 RX ADMIN — Medication 40 MILLIGRAM(S): at 05:04

## 2020-10-29 RX ADMIN — PANTOPRAZOLE SODIUM 40 MILLIGRAM(S): 20 TABLET, DELAYED RELEASE ORAL at 05:05

## 2020-10-29 NOTE — PROGRESS NOTE ADULT - SUBJECTIVE AND OBJECTIVE BOX
CHIEF COMPLAINT:    Interval Events:  NAEO. Reports that her breathing is somewhat worse compared to yesterday, though still overall improved from admission. Still with exertional tachycardia. Charted net negative UOP 1.4L.     REVIEW OF SYSTEMS:  Constitutional: [x] negative [ ] fevers [ ] chills [ ] weight loss [ ] weight gain  HEENT: [x] negative [ ] dry eyes [ ] eye irritation [ ] postnasal drip [ ] nasal congestion  CV: [ ] negative  [ ] chest pain [ ] orthopnea [x] palpitations [ ] murmur  Resp: [ ] negative [ ] cough [x] shortness of breath [ ] dyspnea [ ] wheezing [ ] sputum [ ] hemoptysis  GI: [x] negative [ ] nausea [ ] vomiting [ ] diarrhea [ ] constipation [ ] abd pain [ ] dysphagia   : [x] negative [ ] dysuria [ ] nocturia [ ] hematuria [ ] increased urinary frequency  Musculoskeletal: [x] negative [ ] back pain [ ] myalgias [ ] arthralgias [ ] fracture  Skin: [ ] negative [ ] rash [ ] itch  Neurological: [ ] negative [ ] headache [ ] dizziness [ ] syncope [ ] weakness [ ] numbness  Psychiatric: [ ] negative [ ] anxiety [ ] depression  Endocrine: [ ] negative [ ] diabetes [ ] thyroid problem  Hematologic/Lymphatic: [ ] negative [ ] anemia [ ] bleeding problem  Allergic/Immunologic: [ ] negative [ ] itchy eyes [ ] nasal discharge [ ] hives [ ] angioedema  [ ] All other systems negative  [ ] Unable to assess ROS because ________    OBJECTIVE:  ICU Vital Signs Last 24 Hrs  T(C): 36.4 (29 Oct 2020 12:02), Max: 36.8 (29 Oct 2020 00:38)  T(F): 97.6 (29 Oct 2020 12:02), Max: 98.3 (29 Oct 2020 00:38)  HR: 99 (29 Oct 2020 12:02) (98 - 106)  BP: 90/62 (29 Oct 2020 12:02) (90/62 - 120/81)  BP(mean): --  ABP: --  ABP(mean): --  RR: 18 (29 Oct 2020 12:02) (18 - 18)  SpO2: 94% (29 Oct 2020 12:02) (93% - 98%)        10-28 @ 07:01  -  10-29 @ 07:00  --------------------------------------------------------  IN: 600 mL / OUT: 1900 mL / NET: -1300 mL    10-29 @ 07:01  -  10-29 @ 18:24  --------------------------------------------------------  IN: 280 mL / OUT: 200 mL / NET: 80 mL      CAPILLARY BLOOD GLUCOSE          PHYSICAL EXAM:  GEN: Middle aged female, NAD  HEENT: EOMI, MMM  CV: Tachycardia w/o mrg  PULM: CTAB  ABD: Soft  EXT: WWP, no edema    HOSPITAL MEDICATIONS:  MEDICATIONS  (STANDING):  furosemide    Tablet 40 milliGRAM(s) Oral daily  hydrOXYzine hydrochloride 25 milliGRAM(s) Oral daily  ipratropium    for Nebulization 500 MICROGram(s) Nebulizer every 6 hours  pantoprazole    Tablet 40 milliGRAM(s) Oral before breakfast  predniSONE   Tablet 7 milliGRAM(s) Oral daily  rivaroxaban 10 milliGRAM(s) Oral daily  spironolactone 25 milliGRAM(s) Oral daily  Treprostinil (Remodulin) 2.5mg/mL 7 milliGRAM(s) 7 milliGRAM(s) SubCutaneous Continuous Pump    MEDICATIONS  (PRN):  acetaminophen   Tablet .. 650 milliGRAM(s) Oral every 6 hours PRN Mild Pain (1 - 3)  ipratropium 17 MICROgram(s) HFA Inhaler 1 Puff(s) Inhalation every 6 hours PRN SOB      LABS:                        12.3   11.89 )-----------( 481      ( 29 Oct 2020 08:54 )             42.8     Hgb Trend: 12.3<--, 12.9<--, 10.9<--  10-29    136  |  101  |  11  ----------------------------<  107<H>  3.8   |  23  |  1.05    Ca    8.5      29 Oct 2020 08:54      Creatinine Trend: 1.05<--, 1.04<--, 1.10<--            MICROBIOLOGY:       RADIOLOGY:  [ ] Reviewed and interpreted by me    PULMONARY FUNCTION TESTS:    EKG:

## 2020-10-29 NOTE — CONSULT NOTE ADULT - PROBLEM SELECTOR RECOMMENDATION 3
- Continue remodulin per pulmonary  - Under evaluation for lung transplant at [place] - Continue remodulin per pulmonary  - will discuss with Dr. Yoon whether she should get a combination therapy with PDE5i or endothelin antagonist  - Under evaluation for lung transplant at [place]

## 2020-10-29 NOTE — CONSULT NOTE ADULT - SUBJECTIVE AND OBJECTIVE BOX
HPI:  36 F with pmhx PE on coumadin, pulmonary HTN, anemia referred by her Pulmonologist for tachycardia.   Patient states that she went to her pulmonologist office for follow up and routine visit, states that she had an episode of palpitations yesterday with worsening dyspnea on exertion over the past few dyas. No hx chest pain/ fever/ chills, rigors.   ROS other wise negative. Patient diagnosed as having PAH was on Admapas now on SQ Remodulin since May 2018.   (26 Oct 2020 18:50)      PAST MEDICAL & SURGICAL HISTORY:  Right heart failure    Corneal disorder    Sinusitis    PE (pulmonary thromboembolism)  on Xarelto 10 mg daily    Asthma  On home O2 nightly at 2L via NC    Pulmonary embolism    Pulmonary hypertension    Anemia    Tachycardia    Pacemaker    History of tubal ligation        Review of Systems:  14 point ROS done and found to be negative or noncontributory other than noted in HPI.    MEDICATIONS  (STANDING):  furosemide   Injectable      furosemide   Injectable 40 milliGRAM(s) IV Push daily  ipratropium    for Nebulization 500 MICROGram(s) Nebulizer every 6 hours  pantoprazole    Tablet 40 milliGRAM(s) Oral before breakfast  predniSONE   Tablet 7 milliGRAM(s) Oral daily  rivaroxaban 10 milliGRAM(s) Oral daily  Treprostinil (Remodulin) 2.5mg/mL 7 milliGRAM(s) 7 milliGRAM(s) SubCutaneous Continuous Pump    MEDICATIONS  (PRN):  acetaminophen   Tablet .. 650 milliGRAM(s) Oral every 6 hours PRN Mild Pain (1 - 3)  ipratropium 17 MICROgram(s) HFA Inhaler 1 Puff(s) Inhalation every 6 hours PRN SOB      HOME MEDICATIONS:    Allergies    penicillin (Unknown)  vancomycin (Unknown)    Intolerances    albuterol (Other; Rash)      SOCIAL HISTORY:    FAMILY HISTORY:  FH: anemia    Family history of diabetes mellitus in mother    Family history of diabetes mellitus  in brother        Vital Signs Last 24 Hrs  T(C): 36.7 (29 Oct 2020 08:17), Max: 36.8 (29 Oct 2020 00:38)  T(F): 98.1 (29 Oct 2020 08:17), Max: 98.3 (29 Oct 2020 00:38)  HR: 102 (29 Oct 2020 08:17) (81 - 106)  BP: 96/63 (29 Oct 2020 08:17) (96/63 - 120/81)  BP(mean): --  RR: 18 (29 Oct 2020 08:17) (18 - 18)  SpO2: 97% (29 Oct 2020 08:17) (93% - 99%)    Tele:    General: No distress. Comfortable.  HEENT: EOM intact.  Neck: Neck supple. JVP not elevated. No masses  Chest: Clear to auscultation bilaterally  CV: Normal S1 and S2. No murmurs, rub, or gallops. Radial pulses normal.  Abdomen: Soft, non-distended, non-tender  Skin: No rashes or skin breakdown  Neurology: Alert and oriented times three. Sensation intact  Psych: Affect normal    LABS:                        12.3   11.89 )-----------( 481      ( 29 Oct 2020 08:54 )             42.8     10-29    136  |  101  |  11  ----------------------------<  107<H>  3.8   |  23  |  1.05    Ca    8.5      29 Oct 2020 08:54    RADIOLOGY & ADDITIONAL STUDIES: HPI:  Ms. Conway is a 41 y/o F with asthma, pulmonary hypertension (group I and IV) with hx of PE and evidence of distal emboli, previously on riociquat, now on remodulin, who was noted to have palpitations, increased SOB, abdominal discomfort and persistent sinus tachycardia at her outpatient pulmonologist, Dr. Yoon's office thus was referred for admission. Recently her activity tolerance has decreased from 1/2 block at a slow pace to feeling SOB after a few steps.     Of note, she was last admitted in July 2020 for increased SOB at which time she had an echo showing LVEF 65-70%, LIVDd 2.6, RV enlargement with decreased RV systolic function, mild-mod TR and thickened pericardium. RHC showed low right sided filling pressures, severe pulmonary hypertension and preserved cardiac output (RA 0, PA 80/40/57, PCWP 22, Ao 91%, PA sat 51%, CO/CI 4.1/2.0, PVR 8/6 Graham, SVR 1489, BP 90/65/76 HR 97).    On admission she was noted to be tachcyardic with HR 110s.    PAST MEDICAL & SURGICAL HISTORY:  Right heart failure    Corneal disorder    Sinusitis    PE (pulmonary thromboembolism)  on Xarelto 10 mg daily    Asthma  On home O2 nightly at 2L via NC    Pulmonary embolism    Pulmonary hypertension    Anemia    Tachycardia    Pacemaker    History of tubal ligation        Review of Systems:  14 point ROS done and found to be negative or noncontributory other than noted in HPI.    MEDICATIONS  (STANDING):  furosemide   Injectable      furosemide   Injectable 40 milliGRAM(s) IV Push daily  ipratropium    for Nebulization 500 MICROGram(s) Nebulizer every 6 hours  pantoprazole    Tablet 40 milliGRAM(s) Oral before breakfast  predniSONE   Tablet 7 milliGRAM(s) Oral daily  rivaroxaban 10 milliGRAM(s) Oral daily  Treprostinil (Remodulin) 2.5mg/mL 7 milliGRAM(s) 7 milliGRAM(s) SubCutaneous Continuous Pump    MEDICATIONS  (PRN):  acetaminophen   Tablet .. 650 milliGRAM(s) Oral every 6 hours PRN Mild Pain (1 - 3)  ipratropium 17 MICROgram(s) HFA Inhaler 1 Puff(s) Inhalation every 6 hours PRN SOB      HOME MEDICATIONS:    Allergies    penicillin (Unknown)  vancomycin (Unknown)    Intolerances    albuterol (Other; Rash)      SOCIAL HISTORY:    FAMILY HISTORY:  FH: anemia    Family history of diabetes mellitus in mother    Family history of diabetes mellitus  in brother        Vital Signs Last 24 Hrs  T(C): 36.7 (29 Oct 2020 08:17), Max: 36.8 (29 Oct 2020 00:38)  T(F): 98.1 (29 Oct 2020 08:17), Max: 98.3 (29 Oct 2020 00:38)  HR: 102 (29 Oct 2020 08:17) (81 - 106)  BP: 96/63 (29 Oct 2020 08:17) (96/63 - 120/81)  BP(mean): --  RR: 18 (29 Oct 2020 08:17) (18 - 18)  SpO2: 97% (29 Oct 2020 08:17) (93% - 99%)    Tele:    General: No distress. Comfortable.  HEENT: EOM intact.  Neck: Neck supple. JVP not elevated. No masses  Chest: Clear to auscultation bilaterally  CV: Normal S1 and S2. No murmurs, rub, or gallops. Radial pulses normal.  Abdomen: Soft, non-distended, non-tender  Skin: No rashes or skin breakdown  Neurology: Alert and oriented times three. Sensation intact  Psych: Affect normal    LABS:                        12.3   11.89 )-----------( 481      ( 29 Oct 2020 08:54 )             42.8     10-29    136  |  101  |  11  ----------------------------<  107<H>  3.8   |  23  |  1.05    Ca    8.5      29 Oct 2020 08:54    RADIOLOGY & ADDITIONAL STUDIES: HPI:  Ms. Conway is a 39 y/o F with asthma, pulmonary hypertension (group I and IV) with hx of PE and evidence of distal emboli, previously on riociquat, now on remodulin, who was noted to have palpitations, increased SOB, abdominal discomfort and persistent sinus tachycardia at her outpatient pulmonologist, Dr. Yoon's office thus was referred for admission. Recently her activity tolerance has decreased from 1/2 block at a slow pace to feeling SOB after a few steps earlier this week. She reports having abdominal distention with intermittent diarrhea. She has been off diuretics since July as instructed by Dr. Yoon (was previously on lasix 20mg TIW and spironolactone 25mg daily). She has been tolerating gradual uptitration of her Remodulin and is currently at 23 ng/kg/min via subcutaneous pump. She does not weight herself regularly. Intermittent headaches. Denies orthopnea, cough, CP, palpitations, lightheadedness, and LE edema.    Of note, she was last admitted in 2020 for increased SOB at which time she had an echo showing LVEF 65-70%, LIVDd 2.6, RV enlargement with decreased RV systolic function, mild-mod TR and thickened pericardium. RHC showed low right sided filling pressures, severe pulmonary hypertension and preserved cardiac output (RA 0, PA 80/40/57, PCWP 22, Ao 91%, PA sat 51%, CO/CI 4.1/2.0, PVR 8/6 Graham, SVR 1489, BP 90/65/76 HR 97). She is being evaluated for lung transplant at [facility] and follows there with Dr. Martinez.    On admission she was noted to be tachcyardic with HR 110s. CTA of the chest negative for PE and with stable GGOs. Limited echo showing RV dilation. She has been receiving lasix 40mg IV daily. Labs notable for proBNP of 2492. Lac on 10/16 2.5. Normal renal function.    PAST MEDICAL & SURGICAL HISTORY:  Right heart failure    Corneal disorder    Sinusitis    PE (pulmonary thromboembolism)  on Xarelto 10 mg daily    Asthma  On home O2 nightly at 2L via NC    Pulmonary embolism    Pulmonary hypertension    Anemia    Tachycardia    Pacemaker    History of tubal ligation    Review of Systems:  14 point ROS done and found to be negative or noncontributory other than noted in HPI.    MEDICATIONS  (STANDING):  furosemide   Injectable      furosemide   Injectable 40 milliGRAM(s) IV Push daily  ipratropium    for Nebulization 500 MICROGram(s) Nebulizer every 6 hours  pantoprazole    Tablet 40 milliGRAM(s) Oral before breakfast  predniSONE   Tablet 7 milliGRAM(s) Oral daily  rivaroxaban 10 milliGRAM(s) Oral daily  Treprostinil (Remodulin) 2.5mg/mL 7 milliGRAM(s) 7 milliGRAM(s) SubCutaneous Continuous Pump    MEDICATIONS  (PRN):  acetaminophen   Tablet .. 650 milliGRAM(s) Oral every 6 hours PRN Mild Pain (1 - 3)  ipratropium 17 MICROgram(s) HFA Inhaler 1 Puff(s) Inhalation every 6 hours PRN SOB    HOME MEDICATIONS:    Allergies    penicillin (Unknown)  vancomycin (Unknown)    Intolerances    albuterol (Other; Rash)    SOCIAL HISTORY:    FAMILY HISTORY:  FH: anemia    Family history of diabetes mellitus in mother    Family history of diabetes mellitus  in brother    Vital Signs Last 24 Hrs  T(C): 36.7 (29 Oct 2020 08:17), Max: 36.8 (29 Oct 2020 00:38)  T(F): 98.1 (29 Oct 2020 08:17), Max: 98.3 (29 Oct 2020 00:38)  HR: 102 (29 Oct 2020 08:17) (81 - 106)  BP: 96/63 (29 Oct 2020 08:17) (96/63 - 120/81)  BP(mean): --  RR: 18 (29 Oct 2020 08:17) (18 - 18)  SpO2: 97% (29 Oct 2020 08:17) (93% - 99%)    Tele: SR-ST     General: No distress. Comfortable.  HEENT: EOM intact.  Neck: Neck supple. JVP ~6-8 cm H2O. No masses  Chest: Unlabored. Wheeze throughout  CV: Regular. Tachycardic Normal S1 and S2. Pronounced P2, II/VI SM at LLSB. Radial pulses normal. No LE edema.  Abdomen: Soft, non-distended, mildly tender at subcut pump insertion site  Skin: No rashes or skin breakdown. Warm peripherally  Neurology: Alert and oriented times three. Sensation intact  Psych: Affect normal    LABS:                        12.3   11.89 )-----------( 481      ( 29 Oct 2020 08:54 )             42.8     10-29    136  |  101  |  11  ----------------------------<  107<H>  3.8   |  23  |  1.05    Ca    8.5      29 Oct 2020 08:54    RADIOLOGY & ADDITIONAL STUDIES:< from: Transthoracic Echocardiogram (20 @ 19:25) >  Dimensions:    Normal Values:  LA:     2.4    2.0 - 4.0 cm  Ao:     2.9    2.0 - 3.8 cm  SEPTUM: 1.0    0.6 - 1.2 cm  PWT: 1.0    0.6 - 1.1 cm  LVIDd:  2.6    3.0 - 5.6 cm  LVIDs:  0.9    1.8 - 4.0 cm  Derived variables:  LVMI: 35 g/m2  RWT: 0.81  Fractional short: 62 %  EF (Visual Estimate): 65-70 %  Doppler Peak Velocity (m/sec): AoV=0.9  ------------------------------------------------------------------------  Observations:  Mitral Valve: Normal mitral valve. Minimal mitral  regurgitation.  Aortic Valve/Aorta: Normal trileaflet aortic valve. Peak  transaortic valve gradient equals 3 mm Hg, estimated aortic  valvearea equals 2.5 sqcm. Minimal aortic regurgitation.  Peak left ventricular outflow tract gradient equals 1 mm  Hg.  Aortic Root: 2.9 cm.  LVOT diameter: 2.2 cm.  Left Atrium: Normal left atrium.  LA volume index = 11  cc/m2.  Left Ventricle: Hyperdynamic left ventricular systolic  function. Septal motion consistent with right ventricular  overload. Increased relative wall thickness with normal  left ventricular mass index, consistent with concentric  left ventricular remodeling. Normal diastolic function  Right Heart: Mild right atrial enlargement. The right  ventricle is not well visualized; grossly Right ventricular  enlargement with decreased right ventricular systolic  function. TV s' = 7 cm/sec.  Normal tricuspid valve.  Mild-moderate tricuspid regurgitation. Pulmonic valve not  well visualized, probably normal. Mild pulmonic  regurgitation.  Pericardium/Pleura: Thickened pericardium.  Hemodynamic: Estimated right atrial pressure is 8 mm Hg.  Estimated right ventricular systolic pressure equals 112 mm  Hg, assuming right atrial pressure equals 8 mm Hg,  consistent with severe pulmonary hypertension.  ------------------------------------------------------------------------  Conclusions:  1. Increased relative wall thickness with normal left  ventricular mass index, consistent with concentric left  ventricular remodeling.  2. Hyperdynamic left ventricular systolic function. Septal  motion consistent with right ventricular overload.  3. The right ventricle is not well visualized; grossly  Right ventricular enlargement with decreased right  ventricular systolic function. TV s' = 7 cm/sec.  4. Estimated right ventricular systolic pressure equals 112  mm Hg, assuming right atrial pressure equals 8 mm Hg,  consistent with severepulmonary hypertension.  *** Compared with echocardiogram of 2019, Pulmonary  hypertension has increased.  < from: Cardiac Cath Lab - Adult (20 @ 09:26) >  Pressures:  Baseline  Pressures:  - HR: 97  Pressures:  - Rhythm:  Pressures:  -- Aortic Pressure (S/D/M): 90/65/76  Pressures:  -- Left Ventricle (s/edp): 90/4/--  Pressures:  -- Pulmonary Artery (S/D/M): 80/40/57  Pressures:  -- Pulmonary Capillary Wedge: 31/24/22  Pressures:  -- Right Atrium (a/v/M): 4/-1/0  Pressures:  -- Right Ventricle (s/edp): 80/4/--  O2 Sats:  Baseline  O2 Sats:  - HR: 97  O2 Sats:  - Rhythm:  O2 Sats:  -- AO: 12.1/91.1/14.99  O2 Sats:  -- PA: 12.1/51.2/8.43  Outputs:  Baseline  Outputs:  -- CALCULATIONS: [age]  Outputs:  -- CALCULATIONS: Body Surface Area: 2.00  Outputs:  -- CALCULATIONS: Height in cm: 170.00  Outputs:  -- CALCULATIONS: Sex: Female  Outputs:  -- CALCULATIONS: Weight in k.90  Outputs:  -- OUTPUTS: Blood Oxygen Difference: 6.57  Outputs:  -- OUTPUTS: CO by Xenia: 4.08  Outputs:  -- OUTPUTS: Xenia cardiac index: 2.04  Outputs:  -- OUTPUTS: O2 consumption: 268.00  Outputs:  -- OUTPUTS: Vo2 Indexed: 133.79  Outputs:  -- RESISTANCES: Left ventricular stroke work: 30.59  Outputs:  -- RESISTANCES: Left Ventricular Stroke Work index: 15.27  Outputs:  -- RESISTANCES: Pulmonary vascular index (dsc): 1373.84  Outputs:  -- RESISTANCES: Pulmonary vascular index (Wood Units): 17.18  Outputs: -- RESISTANCES: Pulmonary vascular resistance (dsc): 685.82  Outputs:  -- RESISTANCES: Pulmonary vascular resistance (Wood Units): 8.57  Outputs:  -- RESISTANCES: PVR_SVR Ratio: 0.46  Outputs:  -- RESISTANCES: Systemic vascular index (dsc): 2983.20  Outputs:  -- RESISTANCES: Systemic vascular index (Wood Units): 37.30  Outputs:  -- RESISTANCES: Systemic vascular resistance (dsc): 1489.21  Outputs:  -- RESISTANCES: Systemic vascular resistance (Wood Units): 18.62  Outputs:  -- RESISTANCES: Total pulmonary index (dsc): 2237.40  Outputs:  -- RESISTANCES: Total pulmonary index (Wood Units): 27.97  Outputs:  -- RESISTANCES: Total pulmonary resistance (dsc): 1116.91  Outputs:  -- RESISTANCES: Total pulmonary resistance (Wood Units): 13.96  Outputs:  -- RESISTANCES: Total vascular index (Wood Units): 37.30  Outputs:  -- RESISTANCES: Total vascular resistance (dsc): 1489.21  Outputs:  -- RESISTANCES: Total vascular resistance (Wood Units): 18.62  Outputs:  -- RESISTANCES: Total vascular resistance index (dsc): 2983.20  Outputs:  -- RESISTANCES: TPR_TVR Ratio: 0.75  Outputs:  -- SHUNTS: Qs Indexed: 2.04  Outputs:  -- SHUNTS: Systemic flow: 4.08    < from: CT Chest w/ IV Cont (10.26.20 @ 17:22) >  IMPRESSION: No pleural effusion.    Mosaic groundglass opacities are unchanged likely related to pulmonary hypertension.         HPI:  Ms. Conway is a 41 y/o F with asthma, pulmonary hypertension (group I and IV) with hx of PE and evidence of distal emboli, previously on riociquat, now on subcutaneous remodulin, who was noted to have palpitations, increased SOB, abdominal discomfort and persistent sinus tachycardia at her outpatient pulmonologist, Dr. Yoon's office thus was referred for admission. Recently her activity tolerance has decreased from 1/2 block at a slow pace to feeling SOB after a few steps earlier this week. She reports having abdominal distention with intermittent diarrhea. She has been off diuretics since July as instructed by Dr. Yoon (was previously on lasix 20mg TIW and spironolactone 25mg daily). She has been tolerating gradual uptitration of her Remodulin and is currently at 23 ng/kg/min via subcutaneous pump. She does not weight herself regularly. Intermittent headaches. Denies orthopnea, cough, CP, palpitations, lightheadedness, and LE edema.    Of note, she was last admitted in 2020 for increased SOB at which time she had an echo showing LVEF 65-70%, LIVDd 2.6, RV enlargement with decreased RV systolic function, mild-mod TR and thickened pericardium. RHC showed low right sided filling pressures, severe pulmonary hypertension and preserved cardiac output (RA 0, PA 80/40/57, PCWP 22, Ao 91%, PA sat 51%, CO/CI 4.1/2.0, PVR 8/6 Graham, SVR 1489, BP 90/65/76 HR 97). She is being evaluated for lung transplant at [facility] and follows there with Dr. Martinez.    On admission she was noted to be tachycardic with HR 110s. CTA of the chest negative for PE and with stable GGOs. Limited echo showing RV dilation. She has been receiving lasix 40mg IV daily. Labs notable for proBNP of 2492. Lac on 10/16 2.5. Normal renal function.    PAST MEDICAL & SURGICAL HISTORY:  Right heart failure    Corneal disorder    Sinusitis    PE (pulmonary thromboembolism)  on Xarelto 10 mg daily    Asthma  On home O2 nightly at 2L via NC    Pulmonary embolism    Pulmonary hypertension    Anemia    Tachycardia    Pacemaker    History of tubal ligation    Review of Systems:  14 point ROS done and found to be negative or noncontributory other than noted in HPI.    MEDICATIONS  (STANDING):  furosemide   Injectable      furosemide   Injectable 40 milliGRAM(s) IV Push daily  ipratropium    for Nebulization 500 MICROGram(s) Nebulizer every 6 hours  pantoprazole    Tablet 40 milliGRAM(s) Oral before breakfast  predniSONE   Tablet 7 milliGRAM(s) Oral daily  rivaroxaban 10 milliGRAM(s) Oral daily  Treprostinil (Remodulin) 2.5mg/mL 7 milliGRAM(s) 7 milliGRAM(s) SubCutaneous Continuous Pump    MEDICATIONS  (PRN):  acetaminophen   Tablet .. 650 milliGRAM(s) Oral every 6 hours PRN Mild Pain (1 - 3)  ipratropium 17 MICROgram(s) HFA Inhaler 1 Puff(s) Inhalation every 6 hours PRN SOB    HOME MEDICATIONS:    Allergies    penicillin (Unknown)  vancomycin (Unknown)    Intolerances    albuterol (Other; Rash)    SOCIAL HISTORY:    FAMILY HISTORY:  FH: anemia    Family history of diabetes mellitus in mother    Family history of diabetes mellitus  in brother    Vital Signs Last 24 Hrs  T(C): 36.7 (29 Oct 2020 08:17), Max: 36.8 (29 Oct 2020 00:38)  T(F): 98.1 (29 Oct 2020 08:17), Max: 98.3 (29 Oct 2020 00:38)  HR: 102 (29 Oct 2020 08:17) (81 - 106)  BP: 96/63 (29 Oct 2020 08:17) (96/63 - 120/81)  BP(mean): --  RR: 18 (29 Oct 2020 08:17) (18 - 18)  SpO2: 97% (29 Oct 2020 08:17) (93% - 99%)    Tele: SR-ST     General: No distress. Comfortable.  HEENT: EOM intact.  Neck: Neck supple. JVP ~6-8 cm H2O. No masses  Chest: Unlabored. Wheeze throughout  CV: Regular. Tachycardic Normal S1 and S2. Pronounced P2, II/VI SM at LLSB. Radial pulses normal. No LE edema.  Abdomen: Soft, non-distended, mildly tender at subcut pump insertion site  Skin: No rashes or skin breakdown. Warm peripherally  Neurology: Alert and oriented times three. Sensation intact  Psych: Affect normal    LABS:                        12.3   11.89 )-----------( 481      ( 29 Oct 2020 08:54 )             42.8     10-29    136  |  101  |  11  ----------------------------<  107<H>  3.8   |  23  |  1.05    Ca    8.5      29 Oct 2020 08:54    RADIOLOGY & ADDITIONAL STUDIES:< from: Transthoracic Echocardiogram (20 @ 19:25) >  Dimensions:    Normal Values:  LA:     2.4    2.0 - 4.0 cm  Ao:     2.9    2.0 - 3.8 cm  SEPTUM: 1.0    0.6 - 1.2 cm  PWT: 1.0    0.6 - 1.1 cm  LVIDd:  2.6    3.0 - 5.6 cm  LVIDs:  0.9    1.8 - 4.0 cm  Derived variables:  LVMI: 35 g/m2  RWT: 0.81  Fractional short: 62 %  EF (Visual Estimate): 65-70 %  Doppler Peak Velocity (m/sec): AoV=0.9  ------------------------------------------------------------------------  Observations:  Mitral Valve: Normal mitral valve. Minimal mitral  regurgitation.  Aortic Valve/Aorta: Normal trileaflet aortic valve. Peak  transaortic valve gradient equals 3 mm Hg, estimated aortic  valvearea equals 2.5 sqcm. Minimal aortic regurgitation.  Peak left ventricular outflow tract gradient equals 1 mm  Hg.  Aortic Root: 2.9 cm.  LVOT diameter: 2.2 cm.  Left Atrium: Normal left atrium.  LA volume index = 11  cc/m2.  Left Ventricle: Hyperdynamic left ventricular systolic  function. Septal motion consistent with right ventricular  overload. Increased relative wall thickness with normal  left ventricular mass index, consistent with concentric  left ventricular remodeling. Normal diastolic function  Right Heart: Mild right atrial enlargement. The right  ventricle is not well visualized; grossly Right ventricular  enlargement with decreased right ventricular systolic  function. TV s' = 7 cm/sec.  Normal tricuspid valve.  Mild-moderate tricuspid regurgitation. Pulmonic valve not  well visualized, probably normal. Mild pulmonic  regurgitation.  Pericardium/Pleura: Thickened pericardium.  Hemodynamic: Estimated right atrial pressure is 8 mm Hg.  Estimated right ventricular systolic pressure equals 112 mm  Hg, assuming right atrial pressure equals 8 mm Hg,  consistent with severe pulmonary hypertension.  ------------------------------------------------------------------------  Conclusions:  1. Increased relative wall thickness with normal left  ventricular mass index, consistent with concentric left  ventricular remodeling.  2. Hyperdynamic left ventricular systolic function. Septal  motion consistent with right ventricular overload.  3. The right ventricle is not well visualized; grossly  Right ventricular enlargement with decreased right  ventricular systolic function. TV s' = 7 cm/sec.  4. Estimated right ventricular systolic pressure equals 112  mm Hg, assuming right atrial pressure equals 8 mm Hg,  consistent with severepulmonary hypertension.  *** Compared with echocardiogram of 2019, Pulmonary  hypertension has increased.  < from: Cardiac Cath Lab - Adult (20 @ 09:26) >  Pressures:  Baseline  Pressures:  - HR: 97  Pressures:  - Rhythm:  Pressures:  -- Aortic Pressure (S/D/M): 90/65/76  Pressures:  -- Left Ventricle (s/edp): 90/4/--  Pressures:  -- Pulmonary Artery (S/D/M): 80/40/57  Pressures:  -- Pulmonary Capillary Wedge: 31/24/22  Pressures:  -- Right Atrium (a/v/M): 4/-1/0  Pressures:  -- Right Ventricle (s/edp): 80/4/--  O2 Sats:  Baseline  O2 Sats:  - HR: 97  O2 Sats:  - Rhythm:  O2 Sats:  -- AO: 12.1/91.1/14.99  O2 Sats:  -- PA: 12.1/51.2/8.43  Outputs:  Baseline  Outputs:  -- CALCULATIONS: [age] 40.05  Outputs:  -- CALCULATIONS: Body Surface Area: 2.00  Outputs:  -- CALCULATIONS: Height in cm: 170.00  Outputs:  -- CALCULATIONS: Sex: Female  Outputs:  -- CALCULATIONS: Weight in k.90  Outputs:  -- OUTPUTS: Blood Oxygen Difference: 6.57  Outputs:  -- OUTPUTS: CO by Xenia: 4.08  Outputs:  -- OUTPUTS: Xenia cardiac index: 2.04  Outputs:  -- OUTPUTS: O2 consumption: 268.00  Outputs:  -- OUTPUTS: Vo2 Indexed: 133.79  Outputs:  -- RESISTANCES: Left ventricular stroke work: 30.59  Outputs:  -- RESISTANCES: Left Ventricular Stroke Work index: 15.27  Outputs:  -- RESISTANCES: Pulmonary vascular index (dsc): 1373.84  Outputs:  -- RESISTANCES: Pulmonary vascular index (Wood Units): 17.18  Outputs: -- RESISTANCES: Pulmonary vascular resistance (dsc): 685.82  Outputs:  -- RESISTANCES: Pulmonary vascular resistance (Wood Units): 8.57  Outputs:  -- RESISTANCES: PVR_SVR Ratio: 0.46  Outputs:  -- RESISTANCES: Systemic vascular index (dsc): 2983.20  Outputs:  -- RESISTANCES: Systemic vascular index (Wood Units): 37.30  Outputs:  -- RESISTANCES: Systemic vascular resistance (dsc): 1489.21  Outputs:  -- RESISTANCES: Systemic vascular resistance (Wood Units): 18.62  Outputs:  -- RESISTANCES: Total pulmonary index (dsc): 2237.40  Outputs:  -- RESISTANCES: Total pulmonary index (Wood Units): 27.97  Outputs:  -- RESISTANCES: Total pulmonary resistance (dsc): 1116.91  Outputs:  -- RESISTANCES: Total pulmonary resistance (Wood Units): 13.96  Outputs:  -- RESISTANCES: Total vascular index (Wood Units): 37.30  Outputs:  -- RESISTANCES: Total vascular resistance (dsc): 1489.21  Outputs:  -- RESISTANCES: Total vascular resistance (Wood Units): 18.62  Outputs:  -- RESISTANCES: Total vascular resistance index (dsc): 2983.20  Outputs:  -- RESISTANCES: TPR_TVR Ratio: 0.75  Outputs:  -- SHUNTS: Qs Indexed: 2.04  Outputs:  -- SHUNTS: Systemic flow: 4.08    < from: CT Chest w/ IV Cont (10.26.20 @ 17:22) >  IMPRESSION: No pleural effusion.    Mosaic groundglass opacities are unchanged likely related to pulmonary hypertension.

## 2020-10-29 NOTE — CONSULT NOTE ADULT - ASSESSMENT
Ms. Conway is a 41 y/o F with asthma, pulmonary hypertension (group I and IV) with hx of PE and evidence of distal emboli, previously on riociquat, currently on remodulin and home O2 2L NC who presents with worsening SOB and abdominal distention in the setting of RVSD and severe PH. She has had some symptomatic improvement with IV diuretics although her weight is unchanged. Her right sided filling pressures do not appear elevated on exam. She does not currently appear to be in a low output state. Her renal function is normal. She is generally low normotensive. Ongoing ST with HR to 110s. Leukocytosis improving. Afebrile. She is being evaluated for lung transplant at [place].

## 2020-10-29 NOTE — CONSULT NOTE ADULT - PROBLEM SELECTOR RECOMMENDATION 9
- Change lasix to 40mg PO daily starting 10/30  - Start spironolactone 25mg daily  - Repeat full TTE  - Repeat lactate  - Daily standing weights. Strict I/Os  - Monitor BMP, Mg. Maintain K 4-4.5 and Mg 2-2.5

## 2020-10-29 NOTE — PROGRESS NOTE ADULT - SUBJECTIVE AND OBJECTIVE BOX
Patient is a 40y old  Female who presents with a chief complaint of Referred by Pulmonologist for tachycardia (27 Oct 2020 18:54)    HPI:  36 F with pmhx PE on coumadin, pulmonary HTN, anemia referred by her Pulmonologist for tachycardia.   Patient states that she went to her pulmonologist office for follow up and routine visit, states that she had an episode of palpitations yesterday with worsening dyspnea on exertion over the past few dyas. No hx chest pain/ fever/ chills, rigors.   ROS other wise negative. Patient diagnosed as having PAH was on Admapas now on SQ Remodulin since May 2018.   (26 Oct 2020 18:50)      SUBJECTIVE / OVERNIGHT EVENTS: No events over night.   ROS otherwise negative.     T(C): 36.4 (10-29-20 @ 12:02), Max: 36.7 (10-29-20 @ 08:17)  HR: 99 (10-29-20 @ 12:02) (99 - 102)  BP: 90/62 (10-29-20 @ 12:02) (90/62 - 96/63)  RR: 18 (10-29-20 @ 12:02) (18 - 18)  SpO2: 94% (10-29-20 @ 12:02) (94% - 97%)      MEDICATIONS  (STANDING):  furosemide    Tablet 40 milliGRAM(s) Oral daily  hydrOXYzine hydrochloride 25 milliGRAM(s) Oral daily  ipratropium    for Nebulization 500 MICROGram(s) Nebulizer every 6 hours  pantoprazole    Tablet 40 milliGRAM(s) Oral before breakfast  predniSONE   Tablet 7 milliGRAM(s) Oral daily  rivaroxaban 10 milliGRAM(s) Oral daily  spironolactone 25 milliGRAM(s) Oral daily  Treprostinil (Remodulin) 2.5mg/mL 7 milliGRAM(s) 7 milliGRAM(s) SubCutaneous Continuous Pump    MEDICATIONS  (PRN):  acetaminophen   Tablet .. 650 milliGRAM(s) Oral every 6 hours PRN Mild Pain (1 - 3)  ipratropium 17 MICROgram(s) HFA Inhaler 1 Puff(s) Inhalation every 6 hours PRN SOB    PHYSICAL EXAM:  GENERAL: NAD, well-developed  HEAD:  Atraumatic, Normocephalic  EYES: EOMI, conjunctiva and sclera clear  NECK: Supple, No JVD  CHEST/LUNG: Crackles at bases, no wheeze  HEART: Regular rate and rhythm; No murmurs, rubs, or gallops  ABDOMEN: Soft, Nontender, Nondistended; Bowel sounds present  EXTREMITIES:  2+ Peripheral Pulses, No clubbing, cyanosis, or edema  PSYCH: AAOx3  NEUROLOGY: non-focal  SKIN: No rashes or lesions                                                                        12.3   11.89 )-----------( 481      ( 29 Oct 2020 08:54 )             42.8               136|101|11<107  3.8|23|1.05  8.5,--,--  10-29 @ 08:54    CAPILLARY BLOOD GLUCOSE              RADIOLOGY & ADDITIONAL TESTS:    Imaging Personally Reviewed:    Consultant(s) Notes Reviewed:      Care Discussed with Consultants/Other Providers:

## 2020-10-29 NOTE — PROGRESS NOTE ADULT - ASSESSMENT
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    - TTE in admission/POCUS c/f LV dysfunction 2/2 RV dilation  - CTPE 10/26 w/o e/o PE  - C/w IV lasix 40mg daily, please maintain strict IOs; goal net negative 500cc, monitor closely for hypotension/worsening hypoxemia  - Avoid aggressive resuscitation as may worsen LV dysfunction iso RV overload, if hypotensive please call Medical ICU  - Avoid positive pressure ventilation (BiPAP/CPAP) if possible, if requires increased O2 trial HFNC  - c/w remodulin 23ng/kg/min  - will discuss w/ outpatient pHTN re: further uptitration of vasodilators  - please check labs/lytes as patient being actively diuresed    Demarco Ortiz MD  PGY-4  PCCM Fellow  Pager [phone]

## 2020-10-29 NOTE — CONSULT NOTE ADULT - ATTENDING COMMENTS
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    Patient with acute right heart decompensation likely from volume overload as suggested on bedside echocardiogram. Given enlargement of RV and bowing into the LV would suggest diuresis to offload the RV. Please start with furosemide 40mg IV daily for now and will monitor clinically. Will be in contact with both Dr. Yoon regarding any changes in Remodulin therapy and with [facility] regarding transplant.
Briefly, 41 y/o F with asthma (on home O2 2L), pulmonary hypertension (group I and IV; Dx 2014) with hx of PE and evidence of distal emboli on subcutaneous remodulin, prior AVNRT ablation (5/20), bradycardia s/p dual chamber PPM who was admitted for worsening palpitations, increased SOB, abdominal discomfort referred by Dr. Yoon for admission. Prior ET 1/2 block now limited by short distance. Previously was taking lasix every 3 days and jacqueline daily but stopped around July. Has not checked weights. In ER, had CTA which was negative for PE but showed mosaic pattern. Was givne IV lasix with improvement in symptoms. She has been tolerating gradual uptitration of her Remodulin and is currently at 23 ng/kg/min via subcutaneous pump (uptitration limited by headache, jaw pain, diarrhea). On exam, NAD, laying flat, JvP approx 6 cm w/o HJR, tachycardic, prominent S2, expiratory wheeze bilaterally, nondistended abdomen, no pedal edema. Labs reviewed proBNP 2400 (much higher than outpatient  7/24). Prior RHC 7/23/20 RA 4, RV 80/4, PA 80/40/57, PCWP 22, LVEDP 4; CO/CI 4/2.0; PVR (using EDP 13). REVEAL score is 11 but signs/symptoms concerning.   - change IV lasix to PO  - start jacqueline 25 mg daily  - given worsening symptoms, will d/w Dr. Yoon if she's canddiate for combination therapy; currently on remodulin 23 ng/kg/min  - c/w Xarelto 20 mg daily

## 2020-10-30 LAB
ANION GAP SERPL CALC-SCNC: 13 MMOL/L — SIGNIFICANT CHANGE UP (ref 5–17)
BUN SERPL-MCNC: 15 MG/DL — SIGNIFICANT CHANGE UP (ref 7–23)
CALCIUM SERPL-MCNC: 8.9 MG/DL — SIGNIFICANT CHANGE UP (ref 8.4–10.5)
CHLORIDE SERPL-SCNC: 99 MMOL/L — SIGNIFICANT CHANGE UP (ref 96–108)
CO2 SERPL-SCNC: 24 MMOL/L — SIGNIFICANT CHANGE UP (ref 22–31)
CREAT SERPL-MCNC: 1.09 MG/DL — SIGNIFICANT CHANGE UP (ref 0.5–1.3)
GLUCOSE SERPL-MCNC: 90 MG/DL — SIGNIFICANT CHANGE UP (ref 70–99)
HCT VFR BLD CALC: 43.5 % — SIGNIFICANT CHANGE UP (ref 34.5–45)
HGB BLD-MCNC: 12.8 G/DL — SIGNIFICANT CHANGE UP (ref 11.5–15.5)
LACTATE SERPL-SCNC: 1.3 MMOL/L — SIGNIFICANT CHANGE UP (ref 0.7–2)
MAGNESIUM SERPL-MCNC: 1.9 MG/DL — SIGNIFICANT CHANGE UP (ref 1.6–2.6)
MCHC RBC-ENTMCNC: 18.2 PG — LOW (ref 27–34)
MCHC RBC-ENTMCNC: 29.4 GM/DL — LOW (ref 32–36)
MCV RBC AUTO: 61.8 FL — LOW (ref 80–100)
NRBC # BLD: 0 /100 WBCS — SIGNIFICANT CHANGE UP (ref 0–0)
PHOSPHATE SERPL-MCNC: 3.1 MG/DL — SIGNIFICANT CHANGE UP (ref 2.5–4.5)
PLATELET # BLD AUTO: 481 K/UL — HIGH (ref 150–400)
POTASSIUM SERPL-MCNC: 4.4 MMOL/L — SIGNIFICANT CHANGE UP (ref 3.5–5.3)
POTASSIUM SERPL-SCNC: 4.4 MMOL/L — SIGNIFICANT CHANGE UP (ref 3.5–5.3)
RBC # BLD: 7.04 M/UL — HIGH (ref 3.8–5.2)
RBC # FLD: 22.2 % — HIGH (ref 10.3–14.5)
SODIUM SERPL-SCNC: 136 MMOL/L — SIGNIFICANT CHANGE UP (ref 135–145)
WBC # BLD: 9.54 K/UL — SIGNIFICANT CHANGE UP (ref 3.8–10.5)
WBC # FLD AUTO: 9.54 K/UL — SIGNIFICANT CHANGE UP (ref 3.8–10.5)

## 2020-10-30 PROCEDURE — 99233 SBSQ HOSP IP/OBS HIGH 50: CPT | Mod: GC

## 2020-10-30 PROCEDURE — 99233 SBSQ HOSP IP/OBS HIGH 50: CPT

## 2020-10-30 RX ADMIN — Medication 500 MICROGRAM(S): at 18:19

## 2020-10-30 RX ADMIN — Medication 500 MICROGRAM(S): at 09:03

## 2020-10-30 RX ADMIN — Medication 25 MILLIGRAM(S): at 11:51

## 2020-10-30 RX ADMIN — Medication 500 MICROGRAM(S): at 02:18

## 2020-10-30 RX ADMIN — Medication 7 MILLIGRAM(S): at 05:02

## 2020-10-30 RX ADMIN — SPIRONOLACTONE 25 MILLIGRAM(S): 25 TABLET, FILM COATED ORAL at 05:02

## 2020-10-30 RX ADMIN — Medication 500 MICROGRAM(S): at 13:11

## 2020-10-30 RX ADMIN — PANTOPRAZOLE SODIUM 40 MILLIGRAM(S): 20 TABLET, DELAYED RELEASE ORAL at 05:02

## 2020-10-30 RX ADMIN — RIVAROXABAN 10 MILLIGRAM(S): KIT at 11:51

## 2020-10-30 RX ADMIN — Medication 40 MILLIGRAM(S): at 05:02

## 2020-10-30 NOTE — PROGRESS NOTE ADULT - ASSESSMENT
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    - TTE in admission/POCUS c/f LV dysfunction 2/2 RV dilation  - CTPE 10/26 w/o e/o PE  - transitioned to 40mg PO lasix, goal -1000 Net  - reinitiated on aldactone 25 per HF  - Avoid aggressive resuscitation as may worsen LV dysfunction iso RV overload, if hypotensive please call Medical ICU  - Avoid positive pressure ventilation (BiPAP/CPAP) if possible, if requires increased O2 trial HFNC  - c/w remodulin 23ng/kg/min  - will discuss w/ outpatient pHTN re: further uptitration of vasodilators  - please check labs/lytes as patient being actively diuresed    Demarco Ortiz MD  PGY-4  PCCM Fellow  Pager [phone]   41 yo F PMH WHO class I (possible component of class IV) pHTN w/ history of PE (previously on riociguat, currently on remodulin) on persistent 2LNC, PE on half dose xarelto iso anemia, 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    - TTE in admission/POCUS c/f LV dysfunction 2/2 RV dilation  - CTPE 10/26 w/o e/o PE  - transitioned to 40mg PO lasix, goal -1000 Net  - reinitiated on aldactone 25 per HF  - Avoid aggressive resuscitation as may worsen LV dysfunction iso RV overload, if hypotensive please call Medical ICU  - Avoid positive pressure ventilation (BiPAP/CPAP) if possible, if requires increased O2 trial HFNC  - plan to initiate sildenafil 25mg daily on 10/31; will assess response and titrate accordingly  - c/w remodulin 23ng/kg/min  - will discuss w/ outpatient pHTN re: further uptitration of vasodilators  - please check labs/lytes as patient being actively diuresed    Demarco Ortiz MD  PGY-4  PCCM Fellow  Pager [phone]

## 2020-10-30 NOTE — PROGRESS NOTE ADULT - SUBJECTIVE AND OBJECTIVE BOX
CHIEF COMPLAINT:    Interval Events:  Still reporting exertional dyspnea and tachycardia, though stable. No dizziness or presyncope. Breathing overall improved from admission. S/p ~2L net on IV diuresis. S/p VQ yesterday w/ e/o significant heterogeneous defects    REVIEW OF SYSTEMS:  Constitutional: [x] negative [ ] fevers [ ] chills [ ] weight loss [ ] weight gain  HEENT: [x] negative [ ] dry eyes [ ] eye irritation [ ] postnasal drip [ ] nasal congestion  CV: [ ] negative  [ ] chest pain [ ] orthopnea [x] palpitations [ ] murmur  Resp: [ ] negative [ ] cough [x] shortness of breath [ ] dyspnea [ ] wheezing [ ] sputum [ ] hemoptysis  GI: [x] negative [ ] nausea [ ] vomiting [ ] diarrhea [ ] constipation [ ] abd pain [ ] dysphagia   : [x] negative [ ] dysuria [ ] nocturia [ ] hematuria [ ] increased urinary frequency  Musculoskeletal: [ ] negative [ ] back pain [ ] myalgias [ ] arthralgias [ ] fracture  Skin: [ ] negative [ ] rash [ ] itch  Neurological: [ ] negative [ ] headache [ ] dizziness [ ] syncope [ ] weakness [ ] numbness  Psychiatric: [ ] negative [ ] anxiety [ ] depression  Endocrine: [ ] negative [ ] diabetes [ ] thyroid problem  Hematologic/Lymphatic: [ ] negative [ ] anemia [ ] bleeding problem  Allergic/Immunologic: [ ] negative [ ] itchy eyes [ ] nasal discharge [ ] hives [ ] angioedema  [x] All other systems negative  [ ] Unable to assess ROS because ________    OBJECTIVE:  ICU Vital Signs Last 24 Hrs  T(C): 36.8 (30 Oct 2020 04:27), Max: 36.8 (29 Oct 2020 19:58)  T(F): 98.3 (30 Oct 2020 04:27), Max: 98.3 (29 Oct 2020 19:58)  HR: 98 (30 Oct 2020 04:27) (98 - 104)  BP: 109/74 (30 Oct 2020 04:27) (90/62 - 111/73)  BP(mean): --  ABP: --  ABP(mean): --  RR: 18 (30 Oct 2020 04:27) (18 - 18)  SpO2: 95% (30 Oct 2020 04:27) (94% - 95%)        10-29 @ 07:01  -  10-30 @ 07:00  --------------------------------------------------------  IN: 280 mL / OUT: 2200 mL / NET: -1920 mL    10-30 @ 07:01  -  10-30 @ 09:47  --------------------------------------------------------  IN: 0 mL / OUT: 400 mL / NET: -400 mL      CAPILLARY BLOOD GLUCOSE          PHYSICAL EXAM:  GEN: Middle aged female, NAD  HEENT: EOMI  CV: Tachycardic  PULM: CTAB  ABD: Soft  EXT: WWP, no edema    HOSPITAL MEDICATIONS:  MEDICATIONS  (STANDING):  furosemide    Tablet 40 milliGRAM(s) Oral daily  hydrOXYzine hydrochloride 25 milliGRAM(s) Oral daily  ipratropium    for Nebulization 500 MICROGram(s) Nebulizer every 6 hours  pantoprazole    Tablet 40 milliGRAM(s) Oral before breakfast  predniSONE   Tablet 7 milliGRAM(s) Oral daily  rivaroxaban 10 milliGRAM(s) Oral daily  spironolactone 25 milliGRAM(s) Oral daily  Treprostinil (Remodulin) 2.5mg/mL 7 milliGRAM(s) 7 milliGRAM(s) SubCutaneous Continuous Pump    MEDICATIONS  (PRN):  acetaminophen   Tablet .. 650 milliGRAM(s) Oral every 6 hours PRN Mild Pain (1 - 3)  ipratropium 17 MICROgram(s) HFA Inhaler 1 Puff(s) Inhalation every 6 hours PRN SOB      LABS:                        12.3   11.89 )-----------( 481      ( 29 Oct 2020 08:54 )             42.8     Hgb Trend: 12.3<--, 12.9<--, 10.9<--  10-29    136  |  101  |  11  ----------------------------<  107<H>  3.8   |  23  |  1.05    Ca    8.5      29 Oct 2020 08:54      Creatinine Trend: 1.05<--, 1.04<--, 1.10<--            MICROBIOLOGY:       RADIOLOGY:  [ ] Reviewed and interpreted by me    PULMONARY FUNCTION TESTS:    EKG:

## 2020-10-30 NOTE — PROGRESS NOTE ADULT - SUBJECTIVE AND OBJECTIVE BOX
Patient is a 40y old  Female who presents with a chief complaint of Referred by Pulmonologist for tachycardia (27 Oct 2020 18:54)    HPI:  36 F with pmhx PE on coumadin, pulmonary HTN, anemia referred by her Pulmonologist for tachycardia.   Patient states that she went to her pulmonologist office for follow up and routine visit, states that she had an episode of palpitations yesterday with worsening dyspnea on exertion over the past few dyas. No hx chest pain/ fever/ chills, rigors.   ROS other wise negative. Patient diagnosed as having PAH was on Admapas now on SQ Remodulin since May 2018.   (26 Oct 2020 18:50)      SUBJECTIVE / OVERNIGHT EVENTS: No events over night.   ROS otherwise negative.       T(C): 36.6 (10-30-20 @ 19:57), Max: 36.9 (10-30-20 @ 11:30)  HR: 108 (10-30-20 @ 19:57) (108 - 109)  BP: 93/67 (10-30-20 @ 19:57) (93/67 - 93/69)  RR: 18 (10-30-20 @ 19:57) (18 - 18)  SpO2: 96% (10-30-20 @ 19:57) (95% - 96%)      MEDICATIONS  (STANDING):  furosemide    Tablet 40 milliGRAM(s) Oral daily  hydrOXYzine hydrochloride 25 milliGRAM(s) Oral daily  ipratropium    for Nebulization 500 MICROGram(s) Nebulizer every 6 hours  pantoprazole    Tablet 40 milliGRAM(s) Oral before breakfast  predniSONE   Tablet 7 milliGRAM(s) Oral daily  rivaroxaban 10 milliGRAM(s) Oral daily  spironolactone 25 milliGRAM(s) Oral daily  Treprostinil (Remodulin) 2.5mg/mL 7 milliGRAM(s) 7 milliGRAM(s) SubCutaneous Continuous Pump    MEDICATIONS  (PRN):  acetaminophen   Tablet .. 650 milliGRAM(s) Oral every 6 hours PRN Mild Pain (1 - 3)  ipratropium 17 MICROgram(s) HFA Inhaler 1 Puff(s) Inhalation every 6 hours PRN SOB    PHYSICAL EXAM:  GENERAL: NAD, well-developed  HEAD:  Atraumatic, Normocephalic  EYES: EOMI, conjunctiva and sclera clear  NECK: Supple, No JVD  CHEST/LUNG: Crackles at bases, no wheeze  HEART: Regular rate and rhythm; No murmurs, rubs, or gallops  ABDOMEN: Soft, Nontender, Nondistended; Bowel sounds present  EXTREMITIES:  2+ Peripheral Pulses, No clubbing, cyanosis, or edema  PSYCH: AAOx3  NEUROLOGY: non-focal  SKIN: No rashes or lesions                                                                     12.8   9.54  )-----------( 481      ( 30 Oct 2020 10:15 )             43.5               136|99|15<90  4.4|24|1.09  8.9,1.9,3.1  10-30 @ 10:15            RADIOLOGY & ADDITIONAL TESTS:    Imaging Personally Reviewed:    Consultant(s) Notes Reviewed:      Care Discussed with Consultants/Other Providers:

## 2020-10-30 NOTE — PROGRESS NOTE ADULT - PROBLEM SELECTOR PLAN 1
- Continue lasix 40mg PO daily  - Continue spironolactone 25mg daily  - Repeat full TTE pending  - Repeat lactate 1.3 today  - Daily standing weights. Strict I/Os  - Monitor BMP, Mg. Maintain K 4-4.5 and Mg 2-2.5.

## 2020-10-30 NOTE — PROGRESS NOTE ADULT - SUBJECTIVE AND OBJECTIVE BOX
Subjective:  - NAEO  - States she feels well this morning and denies CP, palpitations, lightheadedness, and SOB    Medications:  acetaminophen   Tablet .. 650 milliGRAM(s) Oral every 6 hours PRN  furosemide    Tablet 40 milliGRAM(s) Oral daily  hydrOXYzine hydrochloride 25 milliGRAM(s) Oral daily  ipratropium    for Nebulization 500 MICROGram(s) Nebulizer every 6 hours  ipratropium 17 MICROgram(s) HFA Inhaler 1 Puff(s) Inhalation every 6 hours PRN  pantoprazole    Tablet 40 milliGRAM(s) Oral before breakfast  predniSONE   Tablet 7 milliGRAM(s) Oral daily  rivaroxaban 10 milliGRAM(s) Oral daily  spironolactone 25 milliGRAM(s) Oral daily  Treprostinil (Remodulin) 2.5mg/mL 7 milliGRAM(s) 7 milliGRAM(s) SubCutaneous Continuous Pump      ICU Vital Signs Last 24 Hrs  T(C): 36.9, Max: 36.9 (10-30 @ 11:30)  HR: 109 (98 - 109)  BP: 93/69 (93/69 - 111/73)  BP(mean): --  ABP: --  ABP(mean): --  RR: 18 (18 - 18)  SpO2: 95% (94% - 95%)    Weight in k.2 (10-30-20)  Weight in k (10-29-20)      I&O's Summary Last 24 Hrs    IN: 280 mL / OUT: 2200 mL / NET: -1920 mL      Tele:  80-90s    Physical Exam:    General: No distress. Comfortable.  HEENT: EOM intact.  Neck: Neck supple. JVP ~6-8 cm H2O. No masses  Chest: CTA  CV: Regular. Tachycardic at times Normal S1 and S2. Pronounced P2, II/VI SM at LLSB. Radial pulses normal. No LE edema.  Abdomen: Soft, non-distended, mildly tender at subcut pump insertion site  Skin: No rashes or skin breakdown. Warm peripherally  Neurology: Alert and oriented times three. Sensation intact  Psych: Affect normal    Labs:    ( 10-30-20 @ 10:15 )               12.8   9.54  )--------( 481                  43.5     ( 10-30-20 @ 10:15 )     136  |  99  |  15  ---------------------<  90  4.4  |  24  |  1.09    Ca 8.9  Phos 3.1  Mg 1.9    ( 10-26-20 @ 16:14 )  TropHS 7     / CK x     / CKMB x        Serum Pro-Brain Natriuretic Peptide: 2492 (10-26-20 @ 16:14)    Lactate, Blood: 1.3 (10-30-20 @ 10:15)

## 2020-10-30 NOTE — PROGRESS NOTE ADULT - PROBLEM SELECTOR PLAN 2
Last Echo 07/21/20- Severe pulmonary HTN with decreased RV function.   Continue with Oxygen, patient off Lasix as per pulmonary.  Continue with Remodulin.  -Under evaluation for lung transplant.

## 2020-10-30 NOTE — PROGRESS NOTE ADULT - PROBLEM SELECTOR PLAN 1
Patient with palpitations, sinus tach on tele, CE x 1 negative,   Pulm following.   - TTE in admission/POCUS c/f LV dysfunction 2/2 RV dilation; suspect tachycardia may be in response from decreased LV output 2/2 RV  -Changed Lasix iv to po daily, monitor Is and Os.

## 2020-10-30 NOTE — PROGRESS NOTE ADULT - ASSESSMENT
Ms. Conway is a 41 y/o F with asthma, pulmonary hypertension (group I and IV) with hx of PE and evidence of distal emboli, previously on riociquat, currently on remodulin and home O2 2L NC who presents with worsening SOB and abdominal distention in the setting of RVSD and severe PH. She has had some symptomatic improvement with IV diuretics although her weight is unchanged. Her right sided filling pressures do not appear elevated on exam. She does not currently appear to be in a low output state. Her renal function is normal. She is generally low normotensive. Ongoing ST with HR to 110s. Leukocytosis improving. Afebrile. She is being evaluated for lung transplant at [place].

## 2020-10-30 NOTE — PROGRESS NOTE ADULT - PROBLEM SELECTOR PLAN 3
- Continue remodulin per pulmonary  - will discuss with Dr. Yoon whether she should get a combination therapy with PDE5i or endothelin antagonist  - Under evaluation for lung transplant at [place].

## 2020-10-31 LAB
ANION GAP SERPL CALC-SCNC: 15 MMOL/L — SIGNIFICANT CHANGE UP (ref 5–17)
BUN SERPL-MCNC: 18 MG/DL — SIGNIFICANT CHANGE UP (ref 7–23)
CALCIUM SERPL-MCNC: 8.9 MG/DL — SIGNIFICANT CHANGE UP (ref 8.4–10.5)
CHLORIDE SERPL-SCNC: 98 MMOL/L — SIGNIFICANT CHANGE UP (ref 96–108)
CO2 SERPL-SCNC: 23 MMOL/L — SIGNIFICANT CHANGE UP (ref 22–31)
CREAT SERPL-MCNC: 1.16 MG/DL — SIGNIFICANT CHANGE UP (ref 0.5–1.3)
GLUCOSE SERPL-MCNC: 96 MG/DL — SIGNIFICANT CHANGE UP (ref 70–99)
MAGNESIUM SERPL-MCNC: 2 MG/DL — SIGNIFICANT CHANGE UP (ref 1.6–2.6)
PHOSPHATE SERPL-MCNC: 3.4 MG/DL — SIGNIFICANT CHANGE UP (ref 2.5–4.5)
POTASSIUM SERPL-MCNC: 4.1 MMOL/L — SIGNIFICANT CHANGE UP (ref 3.5–5.3)
POTASSIUM SERPL-SCNC: 4.1 MMOL/L — SIGNIFICANT CHANGE UP (ref 3.5–5.3)
SODIUM SERPL-SCNC: 136 MMOL/L — SIGNIFICANT CHANGE UP (ref 135–145)

## 2020-10-31 RX ADMIN — Medication 500 MICROGRAM(S): at 17:44

## 2020-10-31 RX ADMIN — RIVAROXABAN 10 MILLIGRAM(S): KIT at 11:18

## 2020-10-31 RX ADMIN — PANTOPRAZOLE SODIUM 40 MILLIGRAM(S): 20 TABLET, DELAYED RELEASE ORAL at 05:13

## 2020-10-31 RX ADMIN — Medication 500 MICROGRAM(S): at 05:14

## 2020-10-31 RX ADMIN — SPIRONOLACTONE 25 MILLIGRAM(S): 25 TABLET, FILM COATED ORAL at 05:14

## 2020-10-31 RX ADMIN — Medication 500 MICROGRAM(S): at 11:18

## 2020-10-31 RX ADMIN — Medication 7 MILLIGRAM(S): at 05:14

## 2020-10-31 RX ADMIN — Medication 40 MILLIGRAM(S): at 05:13

## 2020-10-31 RX ADMIN — Medication 1 DROP(S): at 17:44

## 2020-10-31 NOTE — PROGRESS NOTE ADULT - SUBJECTIVE AND OBJECTIVE BOX
Patient is a 40y old  Female who presents with a chief complaint of Referred by Pulmonologist for tachycardia (27 Oct 2020 18:54)    HPI:  36 F with pmhx PE on coumadin, pulmonary HTN, anemia referred by her Pulmonologist for tachycardia.   Patient states that she went to her pulmonologist office for follow up and routine visit, states that she had an episode of palpitations yesterday with worsening dyspnea on exertion over the past few dyas. No hx chest pain/ fever/ chills, rigors.   ROS other wise negative. Patient diagnosed as having PAH was on Admapas now on SQ Remodulin since May 2018.   (26 Oct 2020 18:50)      SUBJECTIVE / OVERNIGHT EVENTS: No events over night.   ROS otherwise negative.       T(C): 36.8 (10-31-20 @ 11:32), Max: 37.1 (10-31-20 @ 04:36)  HR: 96 (10-31-20 @ 11:32) (96 - 100)  BP: 105/73 (10-31-20 @ 11:32) (101/71 - 105/73)  RR: 18 (10-31-20 @ 11:32) (18 - 18)  SpO2: 96% (10-31-20 @ 11:32) (96% - 97%)      MEDICATIONS  (STANDING):  furosemide    Tablet 40 milliGRAM(s) Oral daily  ipratropium    for Nebulization 500 MICROGram(s) Nebulizer every 6 hours  pantoprazole    Tablet 40 milliGRAM(s) Oral before breakfast  predniSONE   Tablet 7 milliGRAM(s) Oral daily  rivaroxaban 10 milliGRAM(s) Oral daily  sildenafil (VIAGRA) 25 milliGRAM(s) Oral once  spironolactone 25 milliGRAM(s) Oral daily  Treprostinil (Remodulin) 2.5mg/mL 7 milliGRAM(s) 7 milliGRAM(s) SubCutaneous Continuous Pump    MEDICATIONS  (PRN):  acetaminophen   Tablet .. 650 milliGRAM(s) Oral every 6 hours PRN Mild Pain (1 - 3)  ipratropium 17 MICROgram(s) HFA Inhaler 1 Puff(s) Inhalation every 6 hours PRN SOB      PHYSICAL EXAM:  GENERAL: NAD, well-developed  HEAD:  Atraumatic, Normocephalic  EYES: EOMI, conjunctiva and sclera clear  NECK: Supple, No JVD  CHEST/LUNG: Crackles at bases, no wheeze  HEART: Regular rate and rhythm; No murmurs, rubs, or gallops  ABDOMEN: Soft, Nontender, Nondistended; Bowel sounds present  EXTREMITIES:  2+ Peripheral Pulses, No clubbing, cyanosis, or edema  PSYCH: AAOx3  NEUROLOGY: non-focal  SKIN: No rashes or lesions                                                                         12.8   9.54  )-----------( 481      ( 30 Oct 2020 10:15 )             43.5               136|98|18<96  4.1|23|1.16  8.9,2.0,3.4  10-31 @ 10:31    CAPILLARY BLOOD GLUCOSE              RADIOLOGY & ADDITIONAL TESTS:    Imaging Personally Reviewed:    Consultant(s) Notes Reviewed:      Care Discussed with Consultants/Other Providers:

## 2020-10-31 NOTE — PROGRESS NOTE ADULT - PROBLEM SELECTOR PLAN 1
s/p RHC 7/23- RA 4, RV 80/4 PA 80/40 57 PCWP 22 (24) PA 51, A0 91 [place] 4.1/2.0   PVR wedge 8.7, PVR LVEDP 13.3 severe pulmonary HTN   Resume Lasix. Cards and Pulm following.  Add Aldactone as per HF team. Viagra x 1 dose as per pulm.

## 2020-10-31 NOTE — PROGRESS NOTE ADULT - PROBLEM SELECTOR PLAN 3
Patient with palpitations, sinus tach on tele, CE x 1 negative,   Pulm following.   - TTE in admission/POCUS c/f LV dysfunction 2/2 RV dilation; suspect tachycardia may be in response from decreased LV output 2/2 RV  -Changed Lasix iv to po daily, monitor Is and Os. add Aldactone as per HF team.

## 2020-11-01 LAB
ANION GAP SERPL CALC-SCNC: 13 MMOL/L — SIGNIFICANT CHANGE UP (ref 5–17)
BUN SERPL-MCNC: 16 MG/DL — SIGNIFICANT CHANGE UP (ref 7–23)
CALCIUM SERPL-MCNC: 9.1 MG/DL — SIGNIFICANT CHANGE UP (ref 8.4–10.5)
CHLORIDE SERPL-SCNC: 98 MMOL/L — SIGNIFICANT CHANGE UP (ref 96–108)
CO2 SERPL-SCNC: 23 MMOL/L — SIGNIFICANT CHANGE UP (ref 22–31)
CREAT SERPL-MCNC: 1.1 MG/DL — SIGNIFICANT CHANGE UP (ref 0.5–1.3)
GLUCOSE SERPL-MCNC: 113 MG/DL — HIGH (ref 70–99)
MAGNESIUM SERPL-MCNC: 1.9 MG/DL — SIGNIFICANT CHANGE UP (ref 1.6–2.6)
PHOSPHATE SERPL-MCNC: 3.3 MG/DL — SIGNIFICANT CHANGE UP (ref 2.5–4.5)
POTASSIUM SERPL-MCNC: 3.7 MMOL/L — SIGNIFICANT CHANGE UP (ref 3.5–5.3)
POTASSIUM SERPL-SCNC: 3.7 MMOL/L — SIGNIFICANT CHANGE UP (ref 3.5–5.3)
SODIUM SERPL-SCNC: 134 MMOL/L — LOW (ref 135–145)

## 2020-11-01 RX ORDER — HYDROXYZINE HCL 10 MG
12.5 TABLET ORAL ONCE
Refills: 0 | Status: DISCONTINUED | OUTPATIENT
Start: 2020-11-01 | End: 2020-11-04

## 2020-11-01 RX ADMIN — Medication 500 MICROGRAM(S): at 03:50

## 2020-11-01 RX ADMIN — Medication 7 MILLIGRAM(S): at 06:25

## 2020-11-01 RX ADMIN — Medication 650 MILLIGRAM(S): at 10:47

## 2020-11-01 RX ADMIN — Medication 500 MICROGRAM(S): at 11:07

## 2020-11-01 RX ADMIN — RIVAROXABAN 10 MILLIGRAM(S): KIT at 11:07

## 2020-11-01 RX ADMIN — Medication 40 MILLIGRAM(S): at 06:25

## 2020-11-01 RX ADMIN — PANTOPRAZOLE SODIUM 40 MILLIGRAM(S): 20 TABLET, DELAYED RELEASE ORAL at 06:25

## 2020-11-01 RX ADMIN — Medication 10 MILLIGRAM(S): at 18:46

## 2020-11-01 RX ADMIN — Medication 650 MILLIGRAM(S): at 19:36

## 2020-11-01 RX ADMIN — Medication 500 MICROGRAM(S): at 18:25

## 2020-11-01 RX ADMIN — Medication 650 MILLIGRAM(S): at 09:47

## 2020-11-01 RX ADMIN — SPIRONOLACTONE 25 MILLIGRAM(S): 25 TABLET, FILM COATED ORAL at 06:25

## 2020-11-01 RX ADMIN — Medication 650 MILLIGRAM(S): at 20:39

## 2020-11-01 NOTE — PROGRESS NOTE ADULT - PROBLEM SELECTOR PLAN 1
s/p RHC 7/23- RA 4, RV 80/4 PA 80/40 57 PCWP 22 (24) PA 51, A0 91 [place] 4.1/2.0   PVR wedge 8.7, PVR LVEDP 13.3 severe pulmonary HTN   Resume Lasix. Cards and Pulm following.  Add Aldactone as per HF team. Viagra x 1 dose 10 mg as patient having headaches.

## 2020-11-01 NOTE — PROGRESS NOTE ADULT - SUBJECTIVE AND OBJECTIVE BOX
Patient is a 40y old  Female who presents with a chief complaint of Referred by Pulmonologist for tachycardia (27 Oct 2020 18:54)    HPI:  36 F with pmhx PE on coumadin, pulmonary HTN, anemia referred by her Pulmonologist for tachycardia.   Patient states that she went to her pulmonologist office for follow up and routine visit, states that she had an episode of palpitations yesterday with worsening dyspnea on exertion over the past few dyas. No hx chest pain/ fever/ chills, rigors.   ROS other wise negative. Patient diagnosed as having PAH was on Admapas now on SQ Remodulin since May 2018.   (26 Oct 2020 18:50)      SUBJECTIVE / OVERNIGHT EVENTS: No events over night.   ROS otherwise negative. Patient reports headache , 9/10, non radiating.     T(C): 36.5 (11-01-20 @ 11:12), Max: 36.5 (11-01-20 @ 11:12)  HR: 97 (11-01-20 @ 11:12) (97 - 97)  BP: 110/78 (11-01-20 @ 11:12) (110/78 - 110/78)  RR: 18 (11-01-20 @ 11:12) (18 - 18)  SpO2: 91% (11-01-20 @ 11:12) (91% - 91%)    MEDICATIONS  (STANDING):  furosemide    Tablet 40 milliGRAM(s) Oral daily  ipratropium    for Nebulization 500 MICROGram(s) Nebulizer every 6 hours  pantoprazole    Tablet 40 milliGRAM(s) Oral before breakfast  predniSONE   Tablet 7 milliGRAM(s) Oral daily  rivaroxaban 10 milliGRAM(s) Oral daily  sildenafil (REVATIO) 10 milliGRAM(s) Oral once  spironolactone 25 milliGRAM(s) Oral daily  Treprostinil (Remodulin) 2.5mg/mL 7 milliGRAM(s) 7 milliGRAM(s) SubCutaneous Continuous Pump    MEDICATIONS  (PRN):  acetaminophen   Tablet .. 650 milliGRAM(s) Oral every 6 hours PRN Mild Pain (1 - 3)  artificial  tears Solution 1 Drop(s) Both EYES three times a day PRN Dry Eyes  ipratropium 17 MICROgram(s) HFA Inhaler 1 Puff(s) Inhalation every 6 hours PRN SOB      PHYSICAL EXAM:  GENERAL: NAD, well-developed  HEAD:  Atraumatic, Normocephalic  EYES: EOMI, conjunctiva and sclera clear  NECK: Supple, No JVD  CHEST/LUNG: Crackles at bases, no wheeze  HEART: Regular rate and rhythm; No murmurs, rubs, or gallops  ABDOMEN: Soft, Nontender, Nondistended; Bowel sounds present  EXTREMITIES:  2+ Peripheral Pulses, No clubbing, cyanosis, or edema  PSYCH: AAOx3  NEUROLOGY: non-focal  SKIN: No rashes or lesions                                                          134|98|16<113  3.7|23|1.10  9.1,1.9,3.3  11-01 @ 10:22            RADIOLOGY & ADDITIONAL TESTS:    Imaging Personally Reviewed:    Consultant(s) Notes Reviewed:      Care Discussed with Consultants/Other Providers:

## 2020-11-02 LAB
ANION GAP SERPL CALC-SCNC: 13 MMOL/L — SIGNIFICANT CHANGE UP (ref 5–17)
BUN SERPL-MCNC: 19 MG/DL — SIGNIFICANT CHANGE UP (ref 7–23)
CALCIUM SERPL-MCNC: 8.9 MG/DL — SIGNIFICANT CHANGE UP (ref 8.4–10.5)
CHLORIDE SERPL-SCNC: 97 MMOL/L — SIGNIFICANT CHANGE UP (ref 96–108)
CO2 SERPL-SCNC: 24 MMOL/L — SIGNIFICANT CHANGE UP (ref 22–31)
CREAT SERPL-MCNC: 1.15 MG/DL — SIGNIFICANT CHANGE UP (ref 0.5–1.3)
GLUCOSE SERPL-MCNC: 112 MG/DL — HIGH (ref 70–99)
HCT VFR BLD CALC: 40.5 % — SIGNIFICANT CHANGE UP (ref 34.5–45)
HGB BLD-MCNC: 11.9 G/DL — SIGNIFICANT CHANGE UP (ref 11.5–15.5)
MAGNESIUM SERPL-MCNC: 1.9 MG/DL — SIGNIFICANT CHANGE UP (ref 1.6–2.6)
MCHC RBC-ENTMCNC: 18.3 PG — LOW (ref 27–34)
MCHC RBC-ENTMCNC: 29.4 GM/DL — LOW (ref 32–36)
MCV RBC AUTO: 62.3 FL — LOW (ref 80–100)
NRBC # BLD: 0 /100 WBCS — SIGNIFICANT CHANGE UP (ref 0–0)
PHOSPHATE SERPL-MCNC: 3.1 MG/DL — SIGNIFICANT CHANGE UP (ref 2.5–4.5)
PLATELET # BLD AUTO: 384 K/UL — SIGNIFICANT CHANGE UP (ref 150–400)
POTASSIUM SERPL-MCNC: 3.5 MMOL/L — SIGNIFICANT CHANGE UP (ref 3.5–5.3)
POTASSIUM SERPL-SCNC: 3.5 MMOL/L — SIGNIFICANT CHANGE UP (ref 3.5–5.3)
RBC # BLD: 6.5 M/UL — HIGH (ref 3.8–5.2)
RBC # FLD: 21.3 % — HIGH (ref 10.3–14.5)
SARS-COV-2 RNA SPEC QL NAA+PROBE: SIGNIFICANT CHANGE UP
SODIUM SERPL-SCNC: 134 MMOL/L — LOW (ref 135–145)
WBC # BLD: 9.65 K/UL — SIGNIFICANT CHANGE UP (ref 3.8–10.5)
WBC # FLD AUTO: 9.65 K/UL — SIGNIFICANT CHANGE UP (ref 3.8–10.5)

## 2020-11-02 PROCEDURE — 99233 SBSQ HOSP IP/OBS HIGH 50: CPT | Mod: GC

## 2020-11-02 PROCEDURE — 93306 TTE W/DOPPLER COMPLETE: CPT | Mod: 26

## 2020-11-02 RX ADMIN — SPIRONOLACTONE 25 MILLIGRAM(S): 25 TABLET, FILM COATED ORAL at 05:57

## 2020-11-02 RX ADMIN — Medication 500 MICROGRAM(S): at 16:52

## 2020-11-02 RX ADMIN — Medication 500 MICROGRAM(S): at 05:58

## 2020-11-02 RX ADMIN — Medication 40 MILLIGRAM(S): at 05:57

## 2020-11-02 RX ADMIN — Medication 500 MICROGRAM(S): at 00:27

## 2020-11-02 RX ADMIN — Medication 7 MILLIGRAM(S): at 05:57

## 2020-11-02 RX ADMIN — RIVAROXABAN 10 MILLIGRAM(S): KIT at 11:11

## 2020-11-02 RX ADMIN — PANTOPRAZOLE SODIUM 40 MILLIGRAM(S): 20 TABLET, DELAYED RELEASE ORAL at 05:57

## 2020-11-02 RX ADMIN — Medication 10 MILLIGRAM(S): at 17:20

## 2020-11-02 RX ADMIN — Medication 500 MICROGRAM(S): at 11:11

## 2020-11-02 NOTE — PROGRESS NOTE ADULT - SUBJECTIVE AND OBJECTIVE BOX
Patient is a 40y old  Female who presents with a chief complaint of Referred by Pulmonologist for tachycardia (27 Oct 2020 18:54)    HPI:  36 F with pmhx PE on coumadin, pulmonary HTN, anemia referred by her Pulmonologist for tachycardia.   Patient states that she went to her pulmonologist office for follow up and routine visit, states that she had an episode of palpitations yesterday with worsening dyspnea on exertion over the past few dyas. No hx chest pain/ fever/ chills, rigors.   ROS other wise negative. Patient diagnosed as having PAH was on Admapas now on SQ Remodulin since May 2018.   (26 Oct 2020 18:50)      SUBJECTIVE / OVERNIGHT EVENTS: No events over night.   ROS otherwise negative.   T(C): 36.8 (11-02-20 @ 11:25), Max: 36.8 (11-02-20 @ 11:25)  HR: 98 (11-02-20 @ 17:19) (90 - 98)  BP: 105/72 (11-02-20 @ 17:19) (104/72 - 105/72)  RR: 18 (11-02-20 @ 11:25) (18 - 18)  SpO2: 98% (11-02-20 @ 11:25) (98% - 98%)      MEDICATIONS  (STANDING):  furosemide    Tablet 40 milliGRAM(s) Oral daily  hydrOXYzine hydrochloride 12.5 milliGRAM(s) Oral once  ipratropium    for Nebulization 500 MICROGram(s) Nebulizer every 6 hours  pantoprazole    Tablet 40 milliGRAM(s) Oral before breakfast  predniSONE   Tablet 7 milliGRAM(s) Oral daily  rivaroxaban 10 milliGRAM(s) Oral daily  spironolactone 25 milliGRAM(s) Oral daily  Treprostinil (Remodulin) 2.5mg/mL 7 milliGRAM(s) 7 milliGRAM(s) SubCutaneous Continuous Pump    MEDICATIONS  (PRN):  acetaminophen   Tablet .. 650 milliGRAM(s) Oral every 6 hours PRN Mild Pain (1 - 3)  artificial  tears Solution 1 Drop(s) Both EYES three times a day PRN Dry Eyes  ipratropium 17 MICROgram(s) HFA Inhaler 1 Puff(s) Inhalation every 6 hours PRN SOB  PHYSICAL EXAM:  GENERAL: NAD, well-developed  HEAD:  Atraumatic, Normocephalic  EYES: EOMI, conjunctiva and sclera clear  NECK: Supple, No JVD  CHEST/LUNG: Crackles at bases, no wheeze  HEART: Regular rate and rhythm; No murmurs, rubs, or gallops  ABDOMEN: Soft, Nontender, Nondistended; Bowel sounds present  EXTREMITIES:  2+ Peripheral Pulses, No clubbing, cyanosis, or edema  PSYCH: AAOx3  NEUROLOGY: non-focal  SKIN: No rashes or lesions                              11.9   9.65  )-----------( 384      ( 02 Nov 2020 09:28 )             40.5               134|97|19<112  3.5|24|1.15  8.9,1.9,3.1  11-02 @ 09:27            RADIOLOGY & ADDITIONAL TESTS:    Imaging Personally Reviewed:    Consultant(s) Notes Reviewed:      Care Discussed with Consultants/Other Providers:

## 2020-11-02 NOTE — PROGRESS NOTE ADULT - SUBJECTIVE AND OBJECTIVE BOX
Patient is a 40y old  Female who presents with a chief complaint of Referred by Pulmonologist for tachycardia (01 Nov 2020 16:19)      SUBJECTIVE / OVERNIGHT EVENTS:    Patient seen and examined at bedside. No acute events overnight.    T(F): 98 (11-02 @ 04:48), Max: 98 (11-01 @ 20:18)  HR: 92 (11-02 @ 04:48) (92 - 99)  BP: 110/70 (11-02 @ 04:48) (100/70 - 110/78)  RR: 18 (11-02 @ 04:48) (18 - 18)  SpO2: 97% (11-02 @ 04:48) (91% - 97%)    I&O's Summary    01 Nov 2020 07:01  -  02 Nov 2020 07:00  --------------------------------------------------------  IN: 780 mL / OUT: 1850 mL / NET: -1070 mL        MEDICATIONS  (STANDING):  furosemide    Tablet 40 milliGRAM(s) Oral daily  hydrOXYzine hydrochloride 12.5 milliGRAM(s) Oral once  ipratropium    for Nebulization 500 MICROGram(s) Nebulizer every 6 hours  pantoprazole    Tablet 40 milliGRAM(s) Oral before breakfast  predniSONE   Tablet 7 milliGRAM(s) Oral daily  rivaroxaban 10 milliGRAM(s) Oral daily  spironolactone 25 milliGRAM(s) Oral daily  Treprostinil (Remodulin) 2.5mg/mL 7 milliGRAM(s) 7 milliGRAM(s) SubCutaneous Continuous Pump    MEDICATIONS  (PRN):  acetaminophen   Tablet .. 650 milliGRAM(s) Oral every 6 hours PRN Mild Pain (1 - 3)  artificial  tears Solution 1 Drop(s) Both EYES three times a day PRN Dry Eyes  ipratropium 17 MICROgram(s) HFA Inhaler 1 Puff(s) Inhalation every 6 hours PRN SOB      PHYSICAL EXAM:   GEN: Age appropriate, resting comfortably in bed, no acute distress, non toxic appearing, speaking in complete sentences.   HEENT: Conjunctiva and sclera normal  PULM: Lungs CTAB, no wheezes, rales, rhonchi  CV: RRR, S1S2, no MRG  MSK: no stiffness or joint effusions  Abdominal: Soft, nontender to palpation, non-distended, +BS  Extremities: No edema or cyanosis  NEURO: AAOx3  Psych: normal affect, normal behavior  Skin: No rashes, lesions    LABS:  Labs personally reviewed.    Hgb Trend: 12.8<--, 12.3<--, 12.9<--, 10.9<--  11-01    134<L>  |  98  |  16  ----------------------------<  113<H>  3.7   |  23  |  1.10    Ca    9.1      01 Nov 2020 10:22  Phos  3.3     11-01  Mg     1.9     11-01      Creatinine Trend: 1.10<--, 1.16<--, 1.09<--, 1.05<--, 1.04<--, 1.10<--          [facility]  Pulmonary and Critical Care Fellow    PGY-4 Pager: [phone]  [phone]  Night Float:

## 2020-11-02 NOTE — PROGRESS NOTE ADULT - ASSESSMENT
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    Problem: Worsening dyspnea   Assessment: Likely 2/2 to decompensated pHTN and RV dysfunction. She is currently on remodulin. Received sildenafil 10mg yesterday however did not report improvement in symptoms bc took it before sleep. Spoke to Dr. Yoon today who recommended additional dose of 10mg. She reports some tachycardia after taking the sildenafil.  Plan: Recommend continuing sildenafil 10mg orally. Will increase as tolerated.

## 2020-11-02 NOTE — PROGRESS NOTE ADULT - PROBLEM SELECTOR PLAN 2
Last Echo 07/21/20- Severe pulmonary HTN with decreased RV function.   Continue with Oxygen, patient off Lasix as per pulmonary.  Continue with Remodulin.  -Under evaluation for lung transplant.  -Continue with Viagra 10 mg daily.

## 2020-11-03 LAB
ANION GAP SERPL CALC-SCNC: 12 MMOL/L — SIGNIFICANT CHANGE UP (ref 5–17)
BUN SERPL-MCNC: 20 MG/DL — SIGNIFICANT CHANGE UP (ref 7–23)
CALCIUM SERPL-MCNC: 8.9 MG/DL — SIGNIFICANT CHANGE UP (ref 8.4–10.5)
CHLORIDE SERPL-SCNC: 101 MMOL/L — SIGNIFICANT CHANGE UP (ref 96–108)
CO2 SERPL-SCNC: 22 MMOL/L — SIGNIFICANT CHANGE UP (ref 22–31)
CREAT SERPL-MCNC: 0.96 MG/DL — SIGNIFICANT CHANGE UP (ref 0.5–1.3)
GLUCOSE SERPL-MCNC: 90 MG/DL — SIGNIFICANT CHANGE UP (ref 70–99)
HCT VFR BLD CALC: 38.2 % — SIGNIFICANT CHANGE UP (ref 34.5–45)
HGB BLD-MCNC: 11.2 G/DL — LOW (ref 11.5–15.5)
MAGNESIUM SERPL-MCNC: 2.1 MG/DL — SIGNIFICANT CHANGE UP (ref 1.6–2.6)
MCHC RBC-ENTMCNC: 17.9 PG — LOW (ref 27–34)
MCHC RBC-ENTMCNC: 29.3 GM/DL — LOW (ref 32–36)
MCV RBC AUTO: 61 FL — LOW (ref 80–100)
NRBC # BLD: 0 /100 WBCS — SIGNIFICANT CHANGE UP (ref 0–0)
PHOSPHATE SERPL-MCNC: 3.5 MG/DL — SIGNIFICANT CHANGE UP (ref 2.5–4.5)
PLATELET # BLD AUTO: 342 K/UL — SIGNIFICANT CHANGE UP (ref 150–400)
POTASSIUM SERPL-MCNC: 3.4 MMOL/L — LOW (ref 3.5–5.3)
POTASSIUM SERPL-SCNC: 3.4 MMOL/L — LOW (ref 3.5–5.3)
RBC # BLD: 6.26 M/UL — HIGH (ref 3.8–5.2)
RBC # FLD: 21 % — HIGH (ref 10.3–14.5)
SODIUM SERPL-SCNC: 135 MMOL/L — SIGNIFICANT CHANGE UP (ref 135–145)
WBC # BLD: 10.09 K/UL — SIGNIFICANT CHANGE UP (ref 3.8–10.5)
WBC # FLD AUTO: 10.09 K/UL — SIGNIFICANT CHANGE UP (ref 3.8–10.5)

## 2020-11-03 PROCEDURE — 99233 SBSQ HOSP IP/OBS HIGH 50: CPT

## 2020-11-03 PROCEDURE — 99233 SBSQ HOSP IP/OBS HIGH 50: CPT | Mod: GC

## 2020-11-03 RX ORDER — POTASSIUM CHLORIDE 20 MEQ
20 PACKET (EA) ORAL ONCE
Refills: 0 | Status: COMPLETED | OUTPATIENT
Start: 2020-11-03 | End: 2020-11-03

## 2020-11-03 RX ADMIN — Medication 7 MILLIGRAM(S): at 05:17

## 2020-11-03 RX ADMIN — Medication 500 MICROGRAM(S): at 02:31

## 2020-11-03 RX ADMIN — Medication 10 MILLIGRAM(S): at 19:38

## 2020-11-03 RX ADMIN — Medication 500 MICROGRAM(S): at 17:28

## 2020-11-03 RX ADMIN — Medication 20 MILLIEQUIVALENT(S): at 19:34

## 2020-11-03 RX ADMIN — Medication 10 MILLIGRAM(S): at 09:07

## 2020-11-03 RX ADMIN — Medication 500 MICROGRAM(S): at 23:07

## 2020-11-03 RX ADMIN — PANTOPRAZOLE SODIUM 40 MILLIGRAM(S): 20 TABLET, DELAYED RELEASE ORAL at 05:17

## 2020-11-03 RX ADMIN — Medication 40 MILLIGRAM(S): at 05:17

## 2020-11-03 RX ADMIN — SPIRONOLACTONE 25 MILLIGRAM(S): 25 TABLET, FILM COATED ORAL at 05:17

## 2020-11-03 RX ADMIN — RIVAROXABAN 10 MILLIGRAM(S): KIT at 11:04

## 2020-11-03 RX ADMIN — Medication 500 MICROGRAM(S): at 09:07

## 2020-11-03 NOTE — PROGRESS NOTE ADULT - PROBLEM SELECTOR PLAN 3
- Continue remodulin per pulmonary  - started on sildenafil 10 mg daily; would increase to three times/day; defer to pulmonary  - Under evaluation for lung transplant at [place].

## 2020-11-03 NOTE — PROGRESS NOTE ADULT - SUBJECTIVE AND OBJECTIVE BOX
Patient is a 40y old  Female who presents with a chief complaint of Referred by Pulmonologist for tachycardia (27 Oct 2020 18:54)    HPI:  36 F with pmhx PE on coumadin, pulmonary HTN, anemia referred by her Pulmonologist for tachycardia.   Patient states that she went to her pulmonologist office for follow up and routine visit, states that she had an episode of palpitations yesterday with worsening dyspnea on exertion over the past few dyas. No hx chest pain/ fever/ chills, rigors.   ROS other wise negative. Patient diagnosed as having PAH was on Admapas now on SQ Remodulin since May 2018.   (26 Oct 2020 18:50)      SUBJECTIVE / OVERNIGHT EVENTS: No events over night.   ROS otherwise negative.     T(C): 36.7 (11-03-20 @ 11:24), Max: 36.7 (11-03-20 @ 11:24)  HR: 83 (11-03-20 @ 11:24) (83 - 101)  BP: 97/66 (11-03-20 @ 11:24) (97/66 - 105/69)  RR: 18 (11-03-20 @ 11:24) (18 - 18)  SpO2: 96% (11-03-20 @ 11:24) (96% - 96%)    MEDICATIONS  (STANDING):  furosemide    Tablet 40 milliGRAM(s) Oral daily  hydrOXYzine hydrochloride 12.5 milliGRAM(s) Oral once  ipratropium    for Nebulization 500 MICROGram(s) Nebulizer every 6 hours  pantoprazole    Tablet 40 milliGRAM(s) Oral before breakfast  predniSONE   Tablet 7 milliGRAM(s) Oral daily  rivaroxaban 10 milliGRAM(s) Oral daily  sildenafil (REVATIO) 10 milliGRAM(s) Oral two times a day  spironolactone 25 milliGRAM(s) Oral daily  Treprostinil (Remodulin) 2.5mg/mL 7 milliGRAM(s) 7 milliGRAM(s) SubCutaneous Continuous Pump    MEDICATIONS  (PRN):  acetaminophen   Tablet .. 650 milliGRAM(s) Oral every 6 hours PRN Mild Pain (1 - 3)  artificial  tears Solution 1 Drop(s) Both EYES three times a day PRN Dry Eyes  ipratropium 17 MICROgram(s) HFA Inhaler 1 Puff(s) Inhalation every 6 hours PRN SOB  PHYSICAL EXAM:  GENERAL: NAD, well-developed  HEAD:  Atraumatic, Normocephalic  EYES: EOMI, conjunctiva and sclera clear  NECK: Supple, No JVD  CHEST/LUNG: Crackles at bases, no wheeze  HEART: Regular rate and rhythm; No murmurs, rubs, or gallops  ABDOMEN: Soft, Nontender, Nondistended; Bowel sounds present  EXTREMITIES:  2+ Peripheral Pulses, No clubbing, cyanosis, or edema  PSYCH: AAOx3  NEUROLOGY: non-focal  SKIN: No rashes or lesions                                                   11.2   10.09 )-----------( 342      ( 03 Nov 2020 06:21 )             38.2               135|101|20<90  3.4|22|0.96  8.9,2.1,3.5  11-03 @ 06:21          RADIOLOGY & ADDITIONAL TESTS:    Imaging Personally Reviewed:    Consultant(s) Notes Reviewed:      Care Discussed with Consultants/Other Providers:

## 2020-11-03 NOTE — PROGRESS NOTE ADULT - ASSESSMENT
Ms. Conway is a 39 y/o F with asthma, pulmonary hypertension (group I and IV) with hx of PE and evidence of distal emboli, previously on riociquat, currently on remodulin and home O2 2L NC who presents with worsening SOB and abdominal distention in the setting of RVSD and severe PH. She has had some symptomatic improvement with IV diuretics although her weight is unchanged. Her right sided filling pressures do not appear elevated on exam. She does not currently appear to be in a low output state. Her renal function is normal. She is generally low normotensive. Ongoing ST with HR to 110s. Leukocytosis improving. Afebrile. She is being evaluated for lung transplant at [place]. Repeat TTE suggests severe RV dysfunction, hyperdynamic LV function, RVSP 49, IVC collapsible.

## 2020-11-03 NOTE — PROGRESS NOTE ADULT - SUBJECTIVE AND OBJECTIVE BOX
Interval History:  - feels better than admission  - walking to bathroom w/o difficulty  - started on sildenafil and feels transient grogginess which resolves    Medications:  acetaminophen   Tablet .. 650 milliGRAM(s) Oral every 6 hours PRN  artificial  tears Solution 1 Drop(s) Both EYES three times a day PRN  furosemide    Tablet 40 milliGRAM(s) Oral daily  hydrOXYzine hydrochloride 12.5 milliGRAM(s) Oral once  ipratropium    for Nebulization 500 MICROGram(s) Nebulizer every 6 hours  ipratropium 17 MICROgram(s) HFA Inhaler 1 Puff(s) Inhalation every 6 hours PRN  pantoprazole    Tablet 40 milliGRAM(s) Oral before breakfast  predniSONE   Tablet 7 milliGRAM(s) Oral daily  rivaroxaban 10 milliGRAM(s) Oral daily  sildenafil (REVATIO) 10 milliGRAM(s) Oral two times a day  spironolactone 25 milliGRAM(s) Oral daily  Treprostinil (Remodulin) 2.5mg/mL 7 milliGRAM(s) 7 milliGRAM(s) SubCutaneous Continuous Pump      Vitals:  T(C): 36.8 (20 @ 20:22), Max: 36.8 (20 @ 05:00)  HR: 95 (20 @ 20:22) (83 - 101)  BP: 105/74 (20 @ 20:22) (97/66 - 105/74)  BP(mean): --  RR: 19 (20 @ 20:22) (18 - 19)  SpO2: 98% (20 @ 20:22) (96% - 98%)    Daily     Daily Weight in k.7 (2020 08:55)        I&O's Summary    2020 07:01  -  2020 07:00  --------------------------------------------------------  IN: 680 mL / OUT: 0 mL / NET: 680 mL    2020 07:01  -  2020 21:18  --------------------------------------------------------  IN: 1040 mL / OUT: 0 mL / NET: 1040 mL      Physical Exam:  Appearance: No Acute Distress  HEENT: PERRL  Neck: JVD approx 6 cm  Cardiovascular: Normal S1 S2, Prominent P2. No murmurs/rubs/gallops  Respiratory: Clear to auscultation bilaterally  Gastrointestinal: Soft, Non-tender	  Skin: No cyanosis	  Neurologic: Non-focal  Extremities: No LE edema  Psychiatry: A & O x 3, Mood & affect appropriate    Labs:                        11.2   10.09 )-----------( 342      ( 2020 06:21 )             38.2     -    135  |  101  |  20  ----------------------------<  90  3.4<L>   |  22  |  0.96    Ca    8.9      2020 06:21  Phos  3.5       Mg     2.1                   TELEMETRY:    Echocardiogram:

## 2020-11-03 NOTE — PROGRESS NOTE ADULT - PROBLEM SELECTOR PLAN 2
Last Echo 07/21/20- Severe pulmonary HTN with decreased RV function.   Continue with Oxygen, patient off Lasix as per pulmonary.  Continue with Remodulin.  -Under evaluation for lung transplant.  -Continue with Viagra 10 mg BID.

## 2020-11-03 NOTE — DIETITIAN INITIAL EVALUATION ADULT. - PERTINENT MEDS FT
MEDICATIONS  (STANDING):  furosemide    Tablet 40 milliGRAM(s) Oral daily  hydrOXYzine hydrochloride 12.5 milliGRAM(s) Oral once  ipratropium    for Nebulization 500 MICROGram(s) Nebulizer every 6 hours  pantoprazole    Tablet 40 milliGRAM(s) Oral before breakfast  predniSONE   Tablet 7 milliGRAM(s) Oral daily  rivaroxaban 10 milliGRAM(s) Oral daily  sildenafil (REVATIO) 10 milliGRAM(s) Oral <User Schedule>  spironolactone 25 milliGRAM(s) Oral daily  Treprostinil (Remodulin) 2.5mg/mL 7 milliGRAM(s) 7 milliGRAM(s) SubCutaneous Continuous Pump    MEDICATIONS  (PRN):  acetaminophen   Tablet .. 650 milliGRAM(s) Oral every 6 hours PRN Mild Pain (1 - 3)  artificial  tears Solution 1 Drop(s) Both EYES three times a day PRN Dry Eyes  ipratropium 17 MICROgram(s) HFA Inhaler 1 Puff(s) Inhalation every 6 hours PRN SOB

## 2020-11-03 NOTE — PROGRESS NOTE ADULT - PROBLEM SELECTOR PLAN 1
- Continue lasix 40mg PO daily  - Continue spironolactone 25mg daily  - Repeat lactate 1.3  - Daily standing weights. Strict I/Os

## 2020-11-03 NOTE — DIETITIAN INITIAL EVALUATION ADULT. - OTHER INFO
Nutrition Supplements PTA: none  NKFA reported.    Pt UBW: ~190 lbs and reports no recent wt changes PTA. However, pt states she does not own a scale and was not weighing herself regularly at home as has been recommended to her in the past.  Weight history per chart: 202.3 lbs (5/21/18), 196.2 (7/22/20), 197.7 (7/29/20).  Pt current dosing wt 199.9 lbs (stated wt) and recent weights obtained noted as 194.4 lbs (10-31) and 193/3 lbs (11-3). Weight loss likely in setting of diuresis in-patient.    Pt reports good appetite and po intake on current admission, noted with ~100% po intake here per nursing flow sheet.  Pt denies chewing/swallowing difficulties.  Pt has no c/o nausea, vomiting, diarrhea, or constipation.     Nutrition education provided: Heart failure education provided to the patient in detail. Discussed heart failure nutrition therapy, sodium and fluid intake, importance of diet adherence, daily weights monitoring with the patient. Reinforced importance of weight gain parameters and importance of contacting MD’s about weight changes. Pt states she has Heart Failure written and outs at home and does not want any additional written information at this time. RD contact information left with patient for any future questioning. Nutrition Supplements PTA: none  NKFA reported.    Pt UBW: ~190 lbs and reports no recent wt changes PTA. However, pt states she does not own a scale and was not weighing herself regularly at home as has been recommended to her in the past.  Weight history per chart: 202.3 lbs (5/21/18), 196.2 (7/22/20), 197.7 (7/29/20).  Pt current dosing wt 199.9 lbs (stated wt) and recent weights obtained noted as 194.4 lbs (10-31) and 193/3 lbs (11-3). Weight loss likely in setting of diuresis in-patient.    Pt reports good appetite and po intake on current admission, noted with ~100% po intake here per nursing flow sheet.  Pt denies chewing/swallowing difficulties.  Pt has no c/o nausea, vomiting, diarrhea, or constipation.     Nutrition education provided: Heart failure education provided to the patient in detail. Discussed heart failure nutrition therapy, sodium and fluid intake, importance of diet adherence, daily weights monitoring with the patient. Reinforced importance of weight gain parameters and importance of contacting MD’s about weight changes. Discussed high potassium foods to consume to encourage normal potassium labs. Pt states she has Heart Failure written and outs at home and does not want any additional written information at this time. RD contact information left with patient for any future questioning.

## 2020-11-03 NOTE — DIETITIAN INITIAL EVALUATION ADULT. - ADD RECOMMEND
1. Continue DASH/TLC therapeutic diet as indicated, fluids per team 2. Provide/review nutrition education as indicated 3. Will continue to monitor nutrient intake, wt, labs, f/u per protocol

## 2020-11-03 NOTE — PROGRESS NOTE ADULT - SUBJECTIVE AND OBJECTIVE BOX
Patient is a 40y old  Female who presents with a chief complaint of Referred by Pulmonologist for tachycardia (02 Nov 2020 14:43)      SUBJECTIVE / OVERNIGHT EVENTS:    Patient seen and examined at bedside. No acute events overnight.    T(F): 98.2 (11-03 @ 05:00), Max: 98.2 (11-02 @ 11:25)  HR: 85 (11-03 @ 05:00) (85 - 98)  BP: 103/69 (11-03 @ 05:00) (103/69 - 112/78)  RR: 18 (11-03 @ 05:00) (18 - 18)  SpO2: 97% (11-03 @ 05:00) (96% - 98%)    I&O's Summary    02 Nov 2020 07:01  -  03 Nov 2020 07:00  --------------------------------------------------------  IN: 680 mL / OUT: 0 mL / NET: 680 mL        MEDICATIONS  (STANDING):  furosemide    Tablet 40 milliGRAM(s) Oral daily  hydrOXYzine hydrochloride 12.5 milliGRAM(s) Oral once  ipratropium    for Nebulization 500 MICROGram(s) Nebulizer every 6 hours  pantoprazole    Tablet 40 milliGRAM(s) Oral before breakfast  predniSONE   Tablet 7 milliGRAM(s) Oral daily  rivaroxaban 10 milliGRAM(s) Oral daily  sildenafil (REVATIO) 10 milliGRAM(s) Oral <User Schedule>  spironolactone 25 milliGRAM(s) Oral daily  Treprostinil (Remodulin) 2.5mg/mL 7 milliGRAM(s) 7 milliGRAM(s) SubCutaneous Continuous Pump    MEDICATIONS  (PRN):  acetaminophen   Tablet .. 650 milliGRAM(s) Oral every 6 hours PRN Mild Pain (1 - 3)  artificial  tears Solution 1 Drop(s) Both EYES three times a day PRN Dry Eyes  ipratropium 17 MICROgram(s) HFA Inhaler 1 Puff(s) Inhalation every 6 hours PRN SOB      PHYSICAL EXAM:   GEN: Age appropriate, resting comfortably in bed, no acute distress, non toxic appearing, speaking in complete sentences.   HEENT: Conjunctiva and sclera normal  PULM: Lungs CTAB, no wheezes, rales, rhonchi  CV: RRR, S1S2, no MRG  MSK: no stiffness or joint effusions  Abdominal: Soft, nontender to palpation, non-distended, +BS  Extremities: No edema or cyanosis  NEURO: AAOx3  Psych: normal affect, normal behavior  Skin: No rashes, lesions    LABS:  Labs personally reviewed.                        11.2   10.09 )-----------( 342      ( 03 Nov 2020 06:21 )             38.2     Hgb Trend: 11.2<--, 11.9<--, 12.8<--, 12.3<--, 12.9<--  11-03    135  |  101  |  20  ----------------------------<  90  3.4<L>   |  22  |  0.96    Ca    8.9      03 Nov 2020 06:21  Phos  3.5     11-03  Mg     2.1     11-03      Creatinine Trend: 0.96<--, 1.15<--, 1.10<--, 1.16<--, 1.09<--, 1.05<--          [facility]  Pulmonary and Critical Care Fellow    PGY-4 Pager: [phone]  to [phone]  Night Float:

## 2020-11-03 NOTE — DIETITIAN INITIAL EVALUATION ADULT. - CHIEF COMPLAINT
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

## 2020-11-03 NOTE — DIETITIAN INITIAL EVALUATION ADULT. - ORAL INTAKE PTA/DIET HISTORY
Pt reports she and her  do the cooking at home. She consumes anywhere from 1-3 meals daily depending on her appetite. Pt endorses trying to follow a low sodium diet at home, she defers providing a diet recall. Pt states she avoids take out foods. Pt also endorses following a fluid restricted diet at home, however endorses drinking 6-8 cups of fluid daily and states she isn't sure if that is too much for her.

## 2020-11-03 NOTE — DIETITIAN INITIAL EVALUATION ADULT. - PROBLEM SELECTOR PLAN 3
s/p RHC 7/23- RA 4, RV 80/4 PA 80/40 57 PCWP 22 (24) PA 51, A0 91 Xenia 4.1/2.0   PVR wedge 8.7, PVR LVEDP 13.3 severe pulmonary HTN   Patient off Lasix   Cards and Pulm consult.

## 2020-11-03 NOTE — PROGRESS NOTE ADULT - PROBLEM SELECTOR PLAN 1
s/p RHC 7/23- RA 4, RV 80/4 PA 80/40 57 PCWP 22 (24) PA 51, A0 91 Xenia 4.1/2.0   PVR wedge 8.7, PVR LVEDP 13.3 severe pulmonary HTN   Resume Lasix. Cards and Pulm following.  Add Aldactone as per HF team. Increased Viagra 10 mg BID from today 11/03.

## 2020-11-03 NOTE — PROGRESS NOTE ADULT - ASSESSMENT
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    Problem: Worsening dyspnea   Assessment: Likely 2/2 to decompensated pHTN and RV dysfunction. CTPA negative for PE. She is currently on treprostinil (remodulin). Started sildenafil 10mg daily on 11/1. Case discussed with Dr. Yoon today who recommended this regiment and increasing as tolerated.  She reports some tachycardia after taking the sildenafil.  Plan: Recommend continuing sildenafil 10mg orally. Will increase as tolerated.    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    Problem: Worsening dyspnea   Assessment: Likely 2/2 to decompensated pHTN and RV dysfunction. CTPA negative for PE. She is currently on treprostinil (remodulin). Started sildenafil 10mg daily on 11/1. Case discussed with Dr. Yoon today who recommended this regiment and increasing as tolerated.  She reports some tachycardia after taking the sildenafil.  Plan: Recommend continuing sildenafil 10mg orally. Will increase as tolerated.   Please order "fluoroscopy of the diaphragm" and an esophagram. These studies have been requested by [place] as part of her transplant eval. 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    Problem: Worsening dyspnea   Assessment: Likely 2/2 to decompensated pHTN and RV dysfunction. CTPA negative for PE. She is currently on treprostinil (remodulin). Started sildenafil 10mg daily on 11/1. Case discussed with Dr. Yoon today who recommended this regiment and increasing as tolerated.  She reports some tachycardia after taking the sildenafil.  Plan: Case discussed with Dr. Yoon on 11/3. Recommend increasing sildenafil 10mg to BID.     Please order "fluoroscopy of the diaphragm" and an esophagram. These studies have been requested by [place] as part of her transplant eval.

## 2020-11-04 LAB
ANION GAP SERPL CALC-SCNC: 12 MMOL/L — SIGNIFICANT CHANGE UP (ref 5–17)
BUN SERPL-MCNC: 16 MG/DL — SIGNIFICANT CHANGE UP (ref 7–23)
CALCIUM SERPL-MCNC: 8.8 MG/DL — SIGNIFICANT CHANGE UP (ref 8.4–10.5)
CHLORIDE SERPL-SCNC: 100 MMOL/L — SIGNIFICANT CHANGE UP (ref 96–108)
CO2 SERPL-SCNC: 22 MMOL/L — SIGNIFICANT CHANGE UP (ref 22–31)
CREAT SERPL-MCNC: 1.03 MG/DL — SIGNIFICANT CHANGE UP (ref 0.5–1.3)
GLUCOSE SERPL-MCNC: 124 MG/DL — HIGH (ref 70–99)
MAGNESIUM SERPL-MCNC: 2 MG/DL — SIGNIFICANT CHANGE UP (ref 1.6–2.6)
PHOSPHATE SERPL-MCNC: 3.3 MG/DL — SIGNIFICANT CHANGE UP (ref 2.5–4.5)
POTASSIUM SERPL-MCNC: 3.7 MMOL/L — SIGNIFICANT CHANGE UP (ref 3.5–5.3)
POTASSIUM SERPL-SCNC: 3.7 MMOL/L — SIGNIFICANT CHANGE UP (ref 3.5–5.3)
SODIUM SERPL-SCNC: 134 MMOL/L — LOW (ref 135–145)

## 2020-11-04 PROCEDURE — 99233 SBSQ HOSP IP/OBS HIGH 50: CPT | Mod: GC

## 2020-11-04 RX ORDER — HYDROXYZINE HCL 10 MG
12.5 TABLET ORAL ONCE
Refills: 0 | Status: COMPLETED | OUTPATIENT
Start: 2020-11-04 | End: 2020-11-04

## 2020-11-04 RX ADMIN — Medication 10 MILLIGRAM(S): at 18:29

## 2020-11-04 RX ADMIN — PANTOPRAZOLE SODIUM 40 MILLIGRAM(S): 20 TABLET, DELAYED RELEASE ORAL at 05:34

## 2020-11-04 RX ADMIN — Medication 500 MICROGRAM(S): at 11:35

## 2020-11-04 RX ADMIN — Medication 7 MILLIGRAM(S): at 05:33

## 2020-11-04 RX ADMIN — Medication 500 MICROGRAM(S): at 05:34

## 2020-11-04 RX ADMIN — Medication 40 MILLIGRAM(S): at 05:33

## 2020-11-04 RX ADMIN — RIVAROXABAN 10 MILLIGRAM(S): KIT at 11:35

## 2020-11-04 RX ADMIN — SPIRONOLACTONE 25 MILLIGRAM(S): 25 TABLET, FILM COATED ORAL at 05:33

## 2020-11-04 RX ADMIN — Medication 10 MILLIGRAM(S): at 05:33

## 2020-11-04 RX ADMIN — Medication 500 MICROGRAM(S): at 18:30

## 2020-11-04 RX ADMIN — Medication 12.5 MILLIGRAM(S): at 22:11

## 2020-11-04 NOTE — PROGRESS NOTE ADULT - ASSESSMENT
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    Problem: Worsening dyspnea   Assessment: Likely 2/2 to decompensated pHTN and RV dysfunction. CTPA negative for PE. She is currently on treprostinil (remodulin). Started sildenafil 10mg daily on 11/1. Case discussed with Dr. Yoon today who recommended this regiment and increasing as tolerated.  She reports some tachycardia after taking the sildenafil.  Plan:      Please order "fluoroscopy of the diaphragm" and an esophagram. These studies have been requested by [place] as part of her transplant eval. 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    Problem: Worsening dyspnea   Assessment: Likely 2/2 to decompensated pHTN and RV dysfunction. CTPA negative for PE. She is currently on treprostinil (remodulin). Started sildenafil 10mg daily on . Case discussed with Dr. Yoon today who recommended this regiment and increasing as tolerated.  She reports some tachycardia after taking the sildenafil.  Plan: C/w Sildenafil 10mg BID. Can discharge patient from pulmonary standpoint. Please ensure patient has follow up appt with Dr. Yoon prior to leaving the hospital. In addition, if possible please perform below studies while still inpatient. If one of both studies are not performed, please include instructions to have them performed as an outpatient in the discharge summary.      Please order "fluoroscopy of the diaphragm" and an esophagram. These studies have been requested by [place] as part of her transplant eval.     Please email: [email or web address] to setup an appointment prior to discharge. Include the patient's name, , MRN and contact information in the email.      Pulmonary/Sleep Clinic  [address] [place] [address]  [phone]

## 2020-11-04 NOTE — PROGRESS NOTE ADULT - SUBJECTIVE AND OBJECTIVE BOX
Patient is a 40y old  Female who presents with a chief complaint of Referred by Pulmonologist for tachycardia (03 Nov 2020 21:18)      SUBJECTIVE / OVERNIGHT EVENTS:    Patient seen and examined at bedside. No acute events overnight.    T(F): 98.4 (11-04 @ 04:39), Max: 98.4 (11-04 @ 04:39)  HR: 105 (11-04 @ 05:31) (83 - 105)  BP: 109/69 (11-04 @ 05:31) (97/66 - 109/69)  RR: 18 (11-04 @ 04:39) (18 - 19)  SpO2: 97% (11-04 @ 04:39) (96% - 98%)    I&O's Summary    03 Nov 2020 07:01  -  04 Nov 2020 07:00  --------------------------------------------------------  IN: 1040 mL / OUT: 0 mL / NET: 1040 mL        MEDICATIONS  (STANDING):  furosemide    Tablet 40 milliGRAM(s) Oral daily  hydrOXYzine hydrochloride 12.5 milliGRAM(s) Oral once  ipratropium    for Nebulization 500 MICROGram(s) Nebulizer every 6 hours  pantoprazole    Tablet 40 milliGRAM(s) Oral before breakfast  predniSONE   Tablet 7 milliGRAM(s) Oral daily  rivaroxaban 10 milliGRAM(s) Oral daily  sildenafil (REVATIO) 10 milliGRAM(s) Oral two times a day  spironolactone 25 milliGRAM(s) Oral daily  Treprostinil (Remodulin) 2.5mg/mL 7 milliGRAM(s) 7 milliGRAM(s) SubCutaneous Continuous Pump    MEDICATIONS  (PRN):  acetaminophen   Tablet .. 650 milliGRAM(s) Oral every 6 hours PRN Mild Pain (1 - 3)  artificial  tears Solution 1 Drop(s) Both EYES three times a day PRN Dry Eyes  ipratropium 17 MICROgram(s) HFA Inhaler 1 Puff(s) Inhalation every 6 hours PRN SOB      PHYSICAL EXAM:   GEN: Age appropriate, resting comfortably in bed, no acute distress, non toxic appearing, speaking in complete sentences.   HEENT: Conjunctiva and sclera normal  PULM: Lungs CTAB, no wheezes, rales, rhonchi  CV: RRR, S1S2, no MRG  MSK: no stiffness or joint effusions  Abdominal: Soft, nontender to palpation, non-distended, +BS  Extremities: No edema or cyanosis  NEURO: AAOx3  Psych: normal affect, normal behavior  Skin: No rashes, lesions    LABS:  Labs personally reviewed.                        11.2   10.09 )-----------( 342      ( 03 Nov 2020 06:21 )             38.2     Hgb Trend: 11.2<--, 11.9<--, 12.8<--, 12.3<--, 12.9<--  11-03    135  |  101  |  20  ----------------------------<  90  3.4<L>   |  22  |  0.96    Ca    8.9      03 Nov 2020 06:21  Phos  3.5     11-03  Mg     2.1     11-03      Creatinine Trend: 0.96<--, 1.15<--, 1.10<--, 1.16<--, 1.09<--, 1.05<--          Gabino [facility]  Pulmonary and Critical Care Fellow    PGY-4 Pager: [phone]  [phone]  Night Float:

## 2020-11-04 NOTE — PROVIDER CONTACT NOTE (OTHER) - ASSESSMENT
Pt VSS. Pt had chest retractions. Pt put on venturi mask temporarily. Sp02 %. Pt denied cp or any light headiness/dizziness. Lungs clear on ascultation.

## 2020-11-04 NOTE — PROGRESS NOTE ADULT - PROBLEM SELECTOR PLAN 1
s/p RHC 7/23- RA 4, RV 80/4 PA 80/40 57 PCWP 22 (24) PA 51, A0 91 Xenia 4.1/2.0   PVR wedge 8.7, PVR LVEDP 13.3 severe pulmonary HTN   Resume Lasix. Cards and Pulm following.  Add Aldactone as per HF team. Increased Viagra 10 mg BID from 11/03.  Fluoroscopy Diaphragm for pre op test- awaiting lung transplant.

## 2020-11-04 NOTE — PROGRESS NOTE ADULT - PROBLEM SELECTOR PLAN 3
- TTE in admission/POCUS c/f LV dysfunction 2/2 RV dilation; suspect tachycardia may be in response from decreased LV output 2/2 RV  -Changed Lasix iv to po daily, monitor Is and Os. add Aldactone as per HF team.

## 2020-11-05 ENCOUNTER — TRANSCRIPTION ENCOUNTER (OUTPATIENT)
Age: 40
End: 2020-11-05

## 2020-11-05 VITALS
SYSTOLIC BLOOD PRESSURE: 96 MMHG | HEART RATE: 92 BPM | TEMPERATURE: 99 F | DIASTOLIC BLOOD PRESSURE: 67 MMHG | RESPIRATION RATE: 18 BRPM | OXYGEN SATURATION: 96 %

## 2020-11-05 LAB
ANION GAP SERPL CALC-SCNC: 11 MMOL/L — SIGNIFICANT CHANGE UP (ref 5–17)
BUN SERPL-MCNC: 19 MG/DL — SIGNIFICANT CHANGE UP (ref 7–23)
CALCIUM SERPL-MCNC: 8.8 MG/DL — SIGNIFICANT CHANGE UP (ref 8.4–10.5)
CHLORIDE SERPL-SCNC: 102 MMOL/L — SIGNIFICANT CHANGE UP (ref 96–108)
CO2 SERPL-SCNC: 21 MMOL/L — LOW (ref 22–31)
CREAT SERPL-MCNC: 1.06 MG/DL — SIGNIFICANT CHANGE UP (ref 0.5–1.3)
GLUCOSE SERPL-MCNC: 98 MG/DL — SIGNIFICANT CHANGE UP (ref 70–99)
MAGNESIUM SERPL-MCNC: 2 MG/DL — SIGNIFICANT CHANGE UP (ref 1.6–2.6)
PHOSPHATE SERPL-MCNC: 3.9 MG/DL — SIGNIFICANT CHANGE UP (ref 2.5–4.5)
POTASSIUM SERPL-MCNC: 4.1 MMOL/L — SIGNIFICANT CHANGE UP (ref 3.5–5.3)
POTASSIUM SERPL-SCNC: 4.1 MMOL/L — SIGNIFICANT CHANGE UP (ref 3.5–5.3)
SODIUM SERPL-SCNC: 134 MMOL/L — LOW (ref 135–145)

## 2020-11-05 PROCEDURE — 84295 ASSAY OF SERUM SODIUM: CPT

## 2020-11-05 PROCEDURE — 83605 ASSAY OF LACTIC ACID: CPT

## 2020-11-05 PROCEDURE — 93005 ELECTROCARDIOGRAM TRACING: CPT

## 2020-11-05 PROCEDURE — 74220 X-RAY XM ESOPHAGUS 1CNTRST: CPT

## 2020-11-05 PROCEDURE — 71045 X-RAY EXAM CHEST 1 VIEW: CPT

## 2020-11-05 PROCEDURE — 71260 CT THORAX DX C+: CPT

## 2020-11-05 PROCEDURE — 83880 ASSAY OF NATRIURETIC PEPTIDE: CPT

## 2020-11-05 PROCEDURE — U0003: CPT

## 2020-11-05 PROCEDURE — 93308 TTE F-UP OR LMTD: CPT

## 2020-11-05 PROCEDURE — 82435 ASSAY OF BLOOD CHLORIDE: CPT

## 2020-11-05 PROCEDURE — 83735 ASSAY OF MAGNESIUM: CPT

## 2020-11-05 PROCEDURE — C8929: CPT

## 2020-11-05 PROCEDURE — 85610 PROTHROMBIN TIME: CPT

## 2020-11-05 PROCEDURE — 99233 SBSQ HOSP IP/OBS HIGH 50: CPT | Mod: GC

## 2020-11-05 PROCEDURE — 0225U NFCT DS DNA&RNA 21 SARSCOV2: CPT

## 2020-11-05 PROCEDURE — 84100 ASSAY OF PHOSPHORUS: CPT

## 2020-11-05 PROCEDURE — 76000 FLUOROSCOPY <1 HR PHYS/QHP: CPT

## 2020-11-05 PROCEDURE — 85027 COMPLETE CBC AUTOMATED: CPT

## 2020-11-05 PROCEDURE — A9540: CPT

## 2020-11-05 PROCEDURE — 82803 BLOOD GASES ANY COMBINATION: CPT

## 2020-11-05 PROCEDURE — 85014 HEMATOCRIT: CPT

## 2020-11-05 PROCEDURE — 76937 US GUIDE VASCULAR ACCESS: CPT

## 2020-11-05 PROCEDURE — 85018 HEMOGLOBIN: CPT

## 2020-11-05 PROCEDURE — 84484 ASSAY OF TROPONIN QUANT: CPT

## 2020-11-05 PROCEDURE — 84132 ASSAY OF SERUM POTASSIUM: CPT

## 2020-11-05 PROCEDURE — 80053 COMPREHEN METABOLIC PANEL: CPT

## 2020-11-05 PROCEDURE — 86769 SARS-COV-2 COVID-19 ANTIBODY: CPT

## 2020-11-05 PROCEDURE — 74220 X-RAY XM ESOPHAGUS 1CNTRST: CPT | Mod: 26

## 2020-11-05 PROCEDURE — 94640 AIRWAY INHALATION TREATMENT: CPT

## 2020-11-05 PROCEDURE — 82947 ASSAY GLUCOSE BLOOD QUANT: CPT

## 2020-11-05 PROCEDURE — 85730 THROMBOPLASTIN TIME PARTIAL: CPT

## 2020-11-05 PROCEDURE — 99285 EMERGENCY DEPT VISIT HI MDM: CPT | Mod: 25

## 2020-11-05 PROCEDURE — 76000 FLUOROSCOPY <1 HR PHYS/QHP: CPT | Mod: 26,59

## 2020-11-05 PROCEDURE — 82330 ASSAY OF CALCIUM: CPT

## 2020-11-05 PROCEDURE — 80048 BASIC METABOLIC PNL TOTAL CA: CPT

## 2020-11-05 PROCEDURE — 78597 LUNG PERFUSION DIFFERENTIAL: CPT

## 2020-11-05 RX ORDER — HYDROXYZINE HCL 10 MG
0.5 TABLET ORAL
Qty: 3.5 | Refills: 0
Start: 2020-11-05 | End: 2020-11-11

## 2020-11-05 RX ORDER — FUROSEMIDE 40 MG
1 TABLET ORAL
Qty: 30 | Refills: 0
Start: 2020-11-05 | End: 2020-12-04

## 2020-11-05 RX ORDER — SPIRONOLACTONE 25 MG/1
1 TABLET, FILM COATED ORAL
Qty: 30 | Refills: 0
Start: 2020-11-05 | End: 2020-12-04

## 2020-11-05 RX ADMIN — Medication 10 MILLIGRAM(S): at 06:02

## 2020-11-05 RX ADMIN — PANTOPRAZOLE SODIUM 40 MILLIGRAM(S): 20 TABLET, DELAYED RELEASE ORAL at 05:59

## 2020-11-05 RX ADMIN — Medication 650 MILLIGRAM(S): at 14:25

## 2020-11-05 RX ADMIN — Medication 10 MILLIGRAM(S): at 17:06

## 2020-11-05 RX ADMIN — Medication 40 MILLIGRAM(S): at 05:59

## 2020-11-05 RX ADMIN — Medication 500 MICROGRAM(S): at 06:00

## 2020-11-05 RX ADMIN — SPIRONOLACTONE 25 MILLIGRAM(S): 25 TABLET, FILM COATED ORAL at 05:59

## 2020-11-05 RX ADMIN — RIVAROXABAN 10 MILLIGRAM(S): KIT at 13:26

## 2020-11-05 RX ADMIN — Medication 7 MILLIGRAM(S): at 05:59

## 2020-11-05 RX ADMIN — Medication 500 MICROGRAM(S): at 12:40

## 2020-11-05 RX ADMIN — Medication 650 MILLIGRAM(S): at 15:40

## 2020-11-05 NOTE — PROGRESS NOTE ADULT - REASON FOR ADMISSION
Referred by Pulmonologist for tachycardia

## 2020-11-05 NOTE — DISCHARGE NOTE PROVIDER - PROVIDER TOKENS
PROVIDER:[TOKEN:[[id number]],SCHEDULEDAPPT:[11/10/2020],SCHEDULEDAPPTTIME:[01:00 PM],ESTABLISHEDPATIENT:[T]]

## 2020-11-05 NOTE — DISCHARGE NOTE PROVIDER - NSDCMRMEDTOKEN_GEN_ALL_CORE_FT
Atrovent HFA 17 mcg/inh inhalation aerosol: puff(s) inhaled , As Needed  furosemide 40 mg oral tablet: 1 tab(s) orally once a day  ipratropium 500 mcg/2.5 mL inhalation solution: 2.5 milliliter(s) inhaled , As Needed  ocular lubricant ophthalmic solution: 1 drop(s) to each affected eye 3 times a day, As needed, Dry Eyes  omeprazole 40 mg oral delayed release capsule: 1 cap(s) orally once a day  predniSONE: 7 milligram(s) orally once a day  Remodulin 5 mg/mL injectable solution: Please continue with your remodulin at the recommended dose of 19 ng/kg/min  sildenafil 20 mg oral tablet: 0.5 tab(s) orally 2 times a day  spironolactone 25 mg oral tablet: 1 tab(s) orally once a day  Xarelto 10 mg oral tablet: 1 tab(s) orally once a day

## 2020-11-05 NOTE — PROGRESS NOTE ADULT - SUBJECTIVE AND OBJECTIVE BOX
Patient is a 40y old  Female who presents with a chief complaint of Referred by Pulmonologist for tachycardia (04 Nov 2020 14:38)      SUBJECTIVE / OVERNIGHT EVENTS:    Patient seen and examined at bedside. No acute events overnight.    T(F): 98.6 (11-05 @ 04:36), Max: 98.6 (11-05 @ 04:36)  HR: 102 (11-05 @ 06:00) (97 - 113)  BP: 96/62 (11-05 @ 06:00) (95/63 - 108/75)  RR: 18 (11-05 @ 04:36) (18 - 18)  SpO2: 97% (11-05 @ 04:36) (97% - 99%)    I&O's Summary    04 Nov 2020 07:01  -  05 Nov 2020 07:00  --------------------------------------------------------  IN: 815 mL / OUT: 0 mL / NET: 815 mL        MEDICATIONS  (STANDING):  furosemide    Tablet 40 milliGRAM(s) Oral daily  ipratropium    for Nebulization 500 MICROGram(s) Nebulizer every 6 hours  pantoprazole    Tablet 40 milliGRAM(s) Oral before breakfast  predniSONE   Tablet 7 milliGRAM(s) Oral daily  rivaroxaban 10 milliGRAM(s) Oral daily  sildenafil (REVATIO) 10 milliGRAM(s) Oral two times a day  spironolactone 25 milliGRAM(s) Oral daily  Treprostinil (Remodulin) 2.5mg/mL 7 milliGRAM(s) 7 milliGRAM(s) SubCutaneous Continuous Pump    MEDICATIONS  (PRN):  acetaminophen   Tablet .. 650 milliGRAM(s) Oral every 6 hours PRN Mild Pain (1 - 3)  artificial  tears Solution 1 Drop(s) Both EYES three times a day PRN Dry Eyes  ipratropium 17 MICROgram(s) HFA Inhaler 1 Puff(s) Inhalation every 6 hours PRN SOB      PHYSICAL EXAM:   GEN: Age appropriate, resting comfortably in bed, no acute distress, non toxic appearing, speaking in complete sentences.   HEENT: Conjunctiva and sclera normal  PULM: Lungs CTAB, no wheezes, rales, rhonchi  CV: RRR, S1S2, no MRG  MSK: no stiffness or joint effusions  Abdominal: Soft, nontender to palpation, non-distended, +BS  Extremities: No edema or cyanosis  NEURO: AAOx3  Psych: normal affect, normal behavior  Skin: No rashes, lesions    LABS:  Labs personally reviewed.    Hgb Trend: 11.2<--, 11.9<--, 12.8<--, 12.3<--  11-04    134<L>  |  100  |  16  ----------------------------<  124<H>  3.7   |  22  |  1.03    Ca    8.8      04 Nov 2020 08:57  Phos  3.3     11-04  Mg     2.0     11-04      Creatinine Trend: 1.03<--, 0.96<--, 1.15<--, 1.10<--, 1.16<--, 1.09<--          Gabino [facility]  Pulmonary and Critical Care Fellow    PGY-4 Pager: [phone]  [employer]-[phone]  Night Float:

## 2020-11-05 NOTE — DISCHARGE NOTE PROVIDER - HOSPITAL COURSE
36 F with pmhx PE on coumadin, pulmonary HTN, anemia referred by her Pulmonologist for tachycardia. Likely 2/2 to decompensated pHTN and RV dysfunction. CTPA negative for PE. She is currently on treprostinil (remodulin). Started sildenafil 10mg daily on 11/1 and can be increased as tolerated, as per Dr Yoon. Added Aldactone as per HF team. Completed "fluoroscopy of the diaphragm" and an esophagram. These studies have been requested by [place] as part of her transplant eval. Now stable for discharge and to follow up with Dr Yoon on 11/10 at 1PM.

## 2020-11-05 NOTE — DISCHARGE NOTE PROVIDER - NSDCCPCAREPLAN_GEN_ALL_CORE_FT
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   Started sildenafil 10mg daily on 11/1 and can be increased as tolerated, as per Dr Yoon.   Added Aldactone as per HF team. Completed "fluoroscopy of the diaphragm" and an esophagram. These studies have been requested by [place] as part of her transplant evaluation.   Now stable for discharge and to follow up with Dr Yoon on 11/10 at 1PM.      SECONDARY DISCHARGE DIAGNOSES  Diagnosis: Tachycardia  Assessment and Plan of Treatment: -Tachycardia may be in response from decreased LV output secondary to right ventricle.  -Continue with Lasix 40mg daily. Added Aldactone as per HF team.

## 2020-11-05 NOTE — DISCHARGE NOTE NURSING/CASE MANAGEMENT/SOCIAL WORK - PATIENT PORTAL LINK FT
You can access the FollowMyHealth Patient Portal offered by [facility] by registering at the following website: [email or web address]. By joining [employer]’s FollowMyHealth portal, you will also be able to view your health information using other applications (apps) compatible with our system.

## 2020-11-05 NOTE — PROGRESS NOTE ADULT - ASSESSMENT
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    Problem: Worsening dyspnea   Assessment: Likely 2/2 to decompensated pHTN and RV dysfunction. CTPA negative for PE. She is currently on treprostinil (remodulin). Started sildenafil 10mg daily on . Case discussed with Dr. Yoon today who recommended this regiment and increasing as tolerated.  She reports some tachycardia after taking the sildenafil.  Plan: C/w Sildenafil 10mg BID. Will discharge patient on this dose per Dr. Yono. Can discharge patient from pulmonary standpoint. Please ensure patient has follow up appt with Dr. Yoon prior to leaving the hospital. In addition, if possible please perform below studies while still inpatient. If one of both studies are not performed, please include instructions to have them performed as an outpatient in the discharge summary.      Please order "fluoroscopy of the diaphragm" and an esophagram. These studies have been requested by [place] as part of her transplant eval.     Please email: [email or web address] to setup an appointment prior to discharge. Include the patient's name, , MRN and contact information in the email.      Pulmonary/Sleep Clinic  [address] [place] [address]  [phone]

## 2020-11-05 NOTE — DISCHARGE NOTE PROVIDER - CARE PROVIDER_API CALL
Vivian Yoon  CRITICAL CARE MEDICINE  [address], [address]  [place] [address]  Phone: [phone]  Fax: [phone]  Established Patient  Scheduled Appointment: 11/10/2020 01:00 PM

## 2020-11-05 NOTE — PROGRESS NOTE ADULT - ATTENDING COMMENTS
Patient seen and examined, data and imaging personally reviewed by me.  40 year old woman with PH, likely group, with history of PE, previously on riociguat, currently on remodulin, 2 L NC,  admitted with increasing shortness of breath.  CTPA did not show PE but showed mosaic pattern.  REmains on remodulin and sildenafil started last night at 10.  Continue Sildenafil 10mg orally and will gradually titrate up to therapeutic doses if she tolerates.  Agree with current management.
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    Patient doing well with diuresis and breathing is improved from admission. Please transition to PO furosemide and patient will likely continue with aldactone as per cardiology. Will be in contact with both Dr. Yoon regarding any changes in Remodulin therapy and with [facility] regarding transplant. VQ scan is positive so will need to discuss in the future about increasing AC. As per Dr. Yoon, will need to start sildenafil 25mg PO daily while inpatient will increasing dose while at home.     Patient will seen tomorrow by pulmonary to initiate sildenafil 25mg .
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    Patient doing well with diuresis and breathing is improved from admission. Please cont with furosemide 40mg IV daily for now and will monitor clinically. Will be in contact with both Dr. Yoon regarding any changes in Remodulin therapy and with [facility] regarding transplant. Will likely need to be discharged home with a diuretic.
Patient seen and examined, data and imaging reviewed.  Agree with plan as outlined above.  Patient is stable from the pulmonary standpoint to be discharged on current diuretic dose and current Remodulin dose and sildenafil dose.  To follow up with Dr. Yoon as an outpatient.
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    Patient doing well with diuresis and breathing is improved from admission. Please cont with furosemide 40mg IV daily for now and will monitor clinically. Will be in contact with both Dr. Yoon regarding any changes in Remodulin therapy and with [facility] regarding transplant. Will likely need to be discharged home with a diuretic.
Patient seen and examined, data and imaging personally reviewed.  Agree with plan as outlined above.
Patient seen and examined, data personally reviewed, ultrasound performed at bedside.  She remains comfortable off oxygen, has decreased BS at the right base on lung exam.  US shows small effusion on the left and small to moderate on the right.  She is undergoing treatment of her lymphoma.  At this time does not require thoracentesis- she may respond to therapy of underlying lymphoma.  Please reconsult if her oxygen requirements increase or she develops dyspnea.  We can reevaluate for thoracentesis as needed.

## 2020-11-10 ENCOUNTER — APPOINTMENT (OUTPATIENT)
Dept: PULMONOLOGY | Facility: CLINIC | Age: 40
End: 2020-11-10
Payer: MEDICAID

## 2020-11-10 PROCEDURE — 99443: CPT

## 2020-11-10 NOTE — REVIEW OF SYSTEMS
[Dyspnea] : dyspnea [SOB on Exertion] : sob on exertion [Orthopnea] : orthopnea [Palpitations] : palpitations [GERD] : gerd [Anemia] : anemia [Fever] : no fever [Chills] : no chills [Dry Eyes] : no dry eyes [Cough] : no cough [Chest Discomfort] : no chest discomfort [Nasal Discharge] : no nasal discharge [Nocturia] : no nocturia [Arthralgias] : no arthralgias [Raynaud] : no raynaud [Headache] : no headache [Depression] : no depression [Diabetes] : no diabetes

## 2020-11-10 NOTE — DISCUSSION/SUMMARY
[FreeTextEntry1] : -----Assessment and Plan--------39 year-old lady with PULMONARY ARTERIAL HYPERTENSION  S/P PPM  FOR TACHYCARDIA AT [place] ON S/Q REMODULIN - S/P LEFT BREAST BIOPSY \par \par 1]   PULM  ART HTN     --------- WHO GROUP I FUNCTIONAL CLASS II----[ AS PER  DR FREEMAN  WHO DID PULM ANGIO SHE HAS FEATURES OF GROUP I  WITH SOME EVIDENCE OF DISTAL CLOTS] She is on remodulin 24ng/kg/min  -increase to 25ng\par 2) of oxygen medically necessary given severe Pulm Htn, anemia and h/o nocturnal desaturation which could worsen Pulm HTN. ADvised oxygen 2 lpm x 24hrs\par 3) keep euvolemic. Monitor need for lasix. . \par 4) ) -She is  on xarelto---will be on lifelong anticoagulation---.\par 5) , -asthma -use nebs \par 6)   Perssitent sinus tacycardia-----S/P PPM- follows EP clinic at [facility] -\par 7) chronic palpitations- seeing Dr Garcia- heart failure clinic\par 8)  she has features of ERICA- including sleep disturbance, nocturnal desat, and excessive fatigue-  HST was negative for ERICA in 2014 but had desaturation. Will try to repeat HST.\par 9)  has consult /[facility] -----\par 10) Anemia: Last hg was 11. Will monitor for need for transfusions/iron\par 11) breast mass- biopsy negative for malignancy- year;y mammo advised\par 12)up to date with  influenza vac\par 13) f/u in 2 weeks\par \par \par \par \par   Patient at this time  will follow  the above mentioned recommendations --and f/u in 1 month---If you have any questions  I can be reached  at #  [phone]. \par \par Vivian Yoon MD, FCCP \par Director, Pulmonary Hypertension Program \par [facility] \par Division of Pulmonary, Critical Care and Sleep Medicine \par  Professor of Medicine \par [facility]\par \par

## 2020-11-10 NOTE — HISTORY OF PRESENT ILLNESS
[TextBox_4] : ------Patient is here for follow up of her pulmonary htn. ------------------INITIALLY  REFD  WITH   ECHO [DONE  OUTSIDE ] ELEVATED PASP  DILATED RV AND RA---------  HAS BEEN TOLD IN THE PAST SHE HAS  ASTHMA-----long-standing history of dyspnea on exertion-----------------has baseline tachycardia ----.....No history of fever, chills , rigors, chest pain, or hemoptysis. No h/o significant weight loss in last few months. No history of liver dysfunction , collagen vascular disorder or chronic thromboembolic disease. I would classify her dyspnea as WHO  FUNCTIONAL CLASS III-----------no calf tenderness----------SLEEP STUDY SHOWS AHI 0.6 but T 90  < 35 %-----DIAGNOSED  AS PAH ---------WAS ON ADMAPAS  NOW ON   SQ REMODULIN  [ SINCE MAY 2108]  ---S/P PACEMAKER PLACEMENT AT [place]  AND ON METOPROLOL------\par \par ------cardiac CATH ---- 28/ july / 2014.......PAP -76 , Aortic pressure -84, left ventricle edp 10 MMHG , rt atrium - 13, PAP after NO - 42--- CO 3.19,   PVR 1928.78 ( dsc) 24.12 ( woods) PVR after NO ( 692.51( dsc), 8.66 ( enrique)\par \par CARDIAC  CATH 2108-----\par --CTA CHEST---- july 23/2014--- no evidence of PE. non specific ground glass opacities throughout the lungs....moderate pericardial effusion ---\par --repeat CT chest WITHOUT CONTRAST  12/2014 MOSAIC PATTERN UNCHANGED  FROM BEFORE\par vq scan 6/2015 abnormal- low prob PE\par cxr-6/2015 no acute disease \par ---------CT CHEST-----1. Enlarged main pulmonary artery compatible with pulmonary \par hypertension. -2. Patchy bilateral groundglass are unchanged since 06/20/2015 and are of \par uncertain origin. ------       \par \par ECHO ....July 24/ 2014----right ventricular enlargement with decrease ventricular systolic function...estimated rt ventricle systolic pre equals 113mmhg, ---\par \par ECHO 2018----  EF80, [id number]\par \par SEPT 2104  6MWT- WALKED 301 METERS-------PSG   2014   LOW  T90 BUT NO ERICA\par \par \par \par  june 2015---SAW DR FREEMAN  AT [place]  -cath showed   elevated  pressures but  pa ANGIOgram   should only distal clts  not a candidate for  surgery for CTEPH---RA 4 mmhg, RV 46/5, PA 49/24 mean PA 33, PCWP 13   WITH SUGGESTION OF VASODILATOR  RESPONSE  [ D/W DR LUONG]------AS PER HEATH FREEMAN  SHE HAS GROUP I PAH  WITH DISTAL CLOTS S RECOMMENDED   MEDICAL TREATMENT---PT ON  PROCARDIA  AND  ADMAPAS---DID NOT TOLERATE OPSUMIT IN PAST [[-[ DUE TO LOW BP] --------- -------------------------------.\par \par ALSO ON ANTICOAGULATION-follows coumadin clinic  ------- elevated INR today DENIES BLEEDING/HEMATURIA/MELENA\par \par She reports noncompliance to adempas since 8/1/15 has  basla tachycardia f/u with DR KRISHNAMURTHY  cardilogy ---\par \par ---PFT AUG 2016------FVC 2.44, FEV 1 1.61,   TLC 4.37, mild to moderate obstructive lung defect\par ----jan 2017 6MWD 250 m---\par ---2019  6MWT    ---165 METERS\par \par --------ECHO AUG 2016-----EF 82%-----PASP 26 mmHg\par --CATH 2014 ----MEAN PA 77, LVEDP 10, CO 3.14\par \par ------CT SCAN AUG 2016-------patchy bilateral groundglass unchanged since 6/20/2015-----enlarged PA 3.5 cm-----\par CT SCAN  2018--NO PE, PA 4.0 CM,MOSAIC ATTENUATION\par \par july 2018 s/p hospitalization for syncopal episode at home. She was transferred to [facility] where  a pacemaker was inserted d/t tachycardia and syncope. She was also subsequently started on remodulin SQ by Dr Chow--current rate 12ng. She is still have dyspnea.\par \par ------OCT 2018--------s/p hospitalization at [facility]------S/P PACEMAKER PLACEMENT AT [place]------\par \par november 2019 here for acute visit- PAH w/remodulin @16ng infusing subcutaneously\par she is c/o chronic  palpitations, slightly improved.\par comes in today for cough and wheezing- started zpack and prednisone 2 days ago\par \par \par jan 17 2020- PAH- remodulin @16ng- SQ site - has stopped draining, less painful - completed doxy last week and is now on clindamycin ---\par She is also c/o asthma exacerbation- started prednisone 10mg and using nebs- symptoms somewhat improved from a few days ago\par \par Feb 25, 2020  PAH on Xarelto and Remodulin still at 16ng SQ site. New site looks good without infection. Old site has completely healed. Patient still having intermittent jaw claudication. She has more tachycardia with periods. She has Iron deficiency anemia and has stopped taking her Iron due to constipation. she otherwise has good exercise tolerance. Has oxygen at home and is using it.\par \par JULY 2020-----S/P    LEFT BREAST BIOPSY  -------    PAH ON XARELTO   , REMODULIN 19 NG/KG/MIN ----ON OPSUMIT AND  DIURETICS --  HAS GUM PAIN / \par \par aug 2020 pulm htn on remodulin 19ng no jaw pain, no diarrhea- site clean, kevin\par no further facial swelling - broken tooth right upper gum\par She is c/o worsening DOE_ uses oxygen only at night- off diuretics\par breast mass- s/p biopsy- negative for malignancy\par \par SEPT 2020--pulm htn  REMODULIN TO 21 NG/KG/MIN, using  OXYGEN  at 2 lpm,  ON XARELTO AND DIURETICS----[place] LUNG TRANSPLANT APPOINTMENT   SEPT 21\par Currently on clindamycin for skin infection at old remodulin site\par \par 10/2020- Remodulin now up to 23 NG/KG/MIN, using  OXYGEN  at 2 lpm,  ON XARELTO, now off diuretics. S/p [place] transplant eval pending further testing.  remodulin site is d/c/i. Otherwise respiratory symptoms are stable. c/o palpitations, weakness and dyspnea worse\par \par 11/2020 telehealth phone visit w/pt, Dr Yoon and isidro NP\par Pt is s/p hospitalization- doing well on remodulin 24ng

## 2020-11-24 ENCOUNTER — APPOINTMENT (OUTPATIENT)
Dept: PULMONOLOGY | Facility: CLINIC | Age: 40
End: 2020-11-24
Payer: MEDICAID

## 2020-11-24 PROCEDURE — 99215 OFFICE O/P EST HI 40 MIN: CPT

## 2020-11-24 NOTE — HISTORY OF PRESENT ILLNESS
[TextBox_4] : ------Patient is here for follow up of her pulmonary htn. ------------------INITIALLY  REFD  WITH   ECHO [DONE  OUTSIDE ] ELEVATED PASP  DILATED RV AND RA---------  HAS BEEN TOLD IN THE PAST SHE HAS  ASTHMA-----long-standing history of dyspnea on exertion-----------------has baseline tachycardia ----.....No history of fever, chills , rigors, chest pain, or hemoptysis. No h/o significant weight loss in last few months. No history of liver dysfunction , collagen vascular disorder or chronic thromboembolic disease. I would classify her dyspnea as WHO  FUNCTIONAL CLASS III-----------no calf tenderness----------SLEEP STUDY SHOWS AHI 0.6 but T 90  < 35 %-----DIAGNOSED  AS PAH ---------WAS ON ADMAPAS  NOW ON   SQ REMODULIN  [ SINCE MAY 2108]  ---S/P PACEMAKER PLACEMENT AT [place]  AND ON METOPROLOL------\par \par ------cardiac CATH ---- 28/ july / 2014.......PAP -76 , Aortic pressure -84, left ventricle edp 10 MMHG , rt atrium - 13, PAP after NO - 42--- CO 3.19,   PVR 1928.78 ( dsc) 24.12 ( woods) PVR after NO ( 692.51( dsc), 8.66 ( enrique)\par \par CARDIAC  CATH 2108-----\par --CTA CHEST---- july 23/2014--- no evidence of PE. non specific ground glass opacities throughout the lungs....moderate pericardial effusion ---\par --repeat CT chest WITHOUT CONTRAST  12/2014 MOSAIC PATTERN UNCHANGED  FROM BEFORE\par vq scan 6/2015 abnormal- low prob PE\par cxr-6/2015 no acute disease \par ---------CT CHEST-----1. Enlarged main pulmonary artery compatible with pulmonary \par hypertension. -2. Patchy bilateral groundglass are unchanged since 06/20/2015 and are of \par uncertain origin. ------       \par \par ECHO ....July 24/ 2014----right ventricular enlargement with decrease ventricular systolic function...estimated rt ventricle systolic pre equals 113mmhg, ---\par \par ECHO 2018----  EF80, [id number]\par \par SEPT 2104  6MWT- WALKED 301 METERS-------PSG   2014   LOW  T90 BUT NO ERICA\par \par \par \par  june 2015---SAW DR FREEMAN  AT [place]  -cath showed   elevated  pressures but  pa ANGIOgram   should only distal clts  not a candidate for  surgery for CTEPH---RA 4 mmhg, RV 46/5, PA 49/24 mean PA 33, PCWP 13   WITH SUGGESTION OF VASODILATOR  RESPONSE  [ D/W DR LUONG]------AS PER HEATH FREEMAN  SHE HAS GROUP I PAH  WITH DISTAL CLOTS S RECOMMENDED   MEDICAL TREATMENT---PT ON  PROCARDIA  AND  ADMAPAS---DID NOT TOLERATE OPSUMIT IN PAST [[-[ DUE TO LOW BP] --------- -------------------------------.\par \par ALSO ON ANTICOAGULATION-follows coumadin clinic  ------- elevated INR today DENIES BLEEDING/HEMATURIA/MELENA\par \par She reports noncompliance to adempas since 8/1/15 has  basla tachycardia f/u with DR KRISHNAMURTHY  cardilogy ---\par \par ---PFT AUG 2016------FVC 2.44, FEV 1 1.61,   TLC 4.37, mild to moderate obstructive lung defect\par ----jan 2017 6MWD 250 m---\par ---2019  6MWT    ---165 METERS\par \par --------ECHO AUG 2016-----EF 82%-----PASP 26 mmHg\par --CATH 2014 ----MEAN PA 77, LVEDP 10, CO 3.14\par \par ------CT SCAN AUG 2016-------patchy bilateral groundglass unchanged since 6/20/2015-----enlarged PA 3.5 cm-----\par CT SCAN  2018--NO PE, PA 4.0 CM,MOSAIC ATTENUATION\par \par july 2018 s/p hospitalization for syncopal episode at home. She was transferred to [facility] where  a pacemaker was inserted d/t tachycardia and syncope. She was also subsequently started on remodulin SQ by Dr Chow--current rate 12ng. She is still have dyspnea.\par \par ------OCT 2018--------s/p hospitalization at [facility]------S/P PACEMAKER PLACEMENT AT [place]------\par \par november 2019 here for acute visit- PAH w/remodulin @16ng infusing subcutaneously\par she is c/o chronic  palpitations, slightly improved.\par comes in today for cough and wheezing- started zpack and prednisone 2 days ago\par \par \par jan 17 2020- PAH- remodulin @16ng- SQ site - has stopped draining, less painful - completed doxy last week and is now on clindamycin ---\par She is also c/o asthma exacerbation- started prednisone 10mg and using nebs- symptoms somewhat improved from a few days ago\par \par Feb 25, 2020  PAH on Xarelto and Remodulin still at 16ng SQ site. New site looks good without infection. Old site has completely healed. Patient still having intermittent jaw claudication. She has more tachycardia with periods. She has Iron deficiency anemia and has stopped taking her Iron due to constipation. she otherwise has good exercise tolerance. Has oxygen at home and is using it.\par \par JULY 2020-----S/P    LEFT BREAST BIOPSY  -------    PAH ON XARELTO   , REMODULIN 19 NG/KG/MIN ----ON OPSUMIT AND  DIURETICS --  HAS GUM PAIN / \par \par aug 2020 pulm htn on remodulin 19ng no jaw pain, no diarrhea- site clean, kevin\par no further facial swelling - broken tooth right upper gum\par She is c/o worsening DOE_ uses oxygen only at night- off diuretics\par breast mass- s/p biopsy- negative for malignancy\par \par SEPT 2020--pulm htn  REMODULIN TO 21 NG/KG/MIN, using  OXYGEN  at 2 lpm,  ON XARELTO AND DIURETICS----[place] LUNG TRANSPLANT APPOINTMENT   SEPT 21\par Currently on clindamycin for skin infection at old remodulin site\par \par 10/2020- Remodulin now up to 23 NG/KG/MIN, using  OXYGEN  at 2 lpm,  ON XARELTO, now off diuretics. S/p [place] transplant eval pending further testing.  remodulin site is d/c/i. Otherwise respiratory symptoms are stable. c/o palpitations, weakness and dyspnea worse\par \par 11/2020 telehealth phone visit w/pt, Dr Yoon and isidro NP\par Pt is s/p hospitalization- doing well on remodulin 24ng \par \par 11 24 /2020-  telehealth phone visit w/pt, Dr Yoon and isidro NP ------  Pt is s/p hospitalization- doing well on remodulin 26 ng  ----- ON XARELTO,  LASIX AND ALDACTONE  -------LOW DOSE PREDNISONE

## 2020-11-24 NOTE — DISCUSSION/SUMMARY
[FreeTextEntry1] : -----Assessment and Plan--------40  year-old lady with PULMONARY ARTERIAL HYPERTENSION  S/P PPM  FOR TACHYCARDIA AT [place] ON S/Q REMODULIN - S/P LEFT BREAST BIOPSY \par \par 1]   PULM  ART HTN     --------- WHO GROUP I FUNCTIONAL CLASS II----[ AS PER  DR FREEMAN  WHO DID PULM ANGIO SHE HAS FEATURES OF GROUP I  WITH SOME EVIDENCE OF DISTAL CLOTS] She is on remodulin 26ng/kg/min  -increase to 25ng ---ON LASIX  20 THREE TIMES A WEEK   AND  ALDACTONE 25 MG  VERY DAY \par 2) of oxygen medically necessary given severe Pulm Htn, anemia and h/o nocturnal desaturation which could worsen Pulm HTN. ADvised oxygen 2 lpm x 24hrs\par 3) keep euvolemic. -----  .  \par 4) ) -She is  on xarelto---will be on lifelong anticoagulation---.\par 5) , -asthma -use nebs \par 6)   --S/P PPM- follows EP clinic at [place] -\par 7) chronic palpitations- seeing Dr HILTON - heart failure clinic\par 8)  she has features of ERICA- including sleep disturbance, nocturnal desat, and excessive fatigue-  HST was negative for ERICA in 2014 but had desaturation. Will try to repeat HST.\par 9)  has consult /[place] [facility] Transplant Team -----\par 10) Anemia: -------  . Will monitor for need for transfusions/iron\par 11) breast mass- biopsy negative for malignancy- yearLy mammo advised\par 12)up to date with  influenza vac\par 13) f/u in 2 weeks\par \par \par \par \par   Patient at this time  will follow  the above mentioned recommendations --and f/u in 1 month---If you have any questions  I can be reached  at #  [phone]. \par \par Vivian Yoon MD, FCCP \par Director, Pulmonary Hypertension Program \par [facility] \par Division of Pulmonary, Critical Care and Sleep Medicine \par  Professor of Medicine \par [employer]\par \par

## 2020-12-03 ENCOUNTER — NON-APPOINTMENT (OUTPATIENT)
Age: 40
End: 2020-12-03

## 2020-12-22 ENCOUNTER — APPOINTMENT (OUTPATIENT)
Dept: PULMONOLOGY | Facility: CLINIC | Age: 40
End: 2020-12-22
Payer: MEDICAID

## 2020-12-22 PROCEDURE — 99205 OFFICE O/P NEW HI 60 MIN: CPT | Mod: 95

## 2020-12-22 NOTE — HISTORY OF PRESENT ILLNESS
[TextBox_4] : ------Patient is here for follow up of her pulmonary htn. ------------------INITIALLY  REFD  WITH   ECHO [DONE  OUTSIDE ] ELEVATED PASP  DILATED RV AND RA---------  HAS BEEN TOLD IN THE PAST SHE HAS  ASTHMA-----long-standing history of dyspnea on exertion-----------------has baseline tachycardia ----.....No history of fever, chills , rigors, chest pain, or hemoptysis. No h/o significant weight loss in last few months. No history of liver dysfunction , collagen vascular disorder or chronic thromboembolic disease. I would classify her dyspnea as WHO  FUNCTIONAL CLASS III-----------no calf tenderness----------SLEEP STUDY SHOWS AHI 0.6 but T 90  < 35 %-----DIAGNOSED  AS PAH ---------WAS ON ADMAPAS  NOW ON   SQ REMODULIN  [ SINCE MAY 2108]  ---S/P PACEMAKER PLACEMENT AT [place]  AND ON METOPROLOL------\par \par ------cardiac CATH ---- 28/ july / 2014.......PAP -76 , Aortic pressure -84, left ventricle edp 10 MMHG , rt atrium - 13, PAP after NO - 42--- CO 3.19,   PVR 1928.78 ( dsc) 24.12 ( woods) PVR after NO ( 692.51( dsc), 8.66 ( enrique)\par \par CARDIAC  CATH 2108-----\par --CTA CHEST---- july 23/2014--- no evidence of PE. non specific ground glass opacities throughout the lungs....moderate pericardial effusion ---\par --repeat CT chest WITHOUT CONTRAST  12/2014 MOSAIC PATTERN UNCHANGED  FROM BEFORE\par vq scan 6/2015 abnormal- low prob PE\par cxr-6/2015 no acute disease \par ---------CT CHEST-----1. Enlarged main pulmonary artery compatible with pulmonary \par hypertension. -2. Patchy bilateral groundglass are unchanged since 06/20/2015 and are of \par uncertain origin. ------       \par \par ECHO ....July 24/ 2014----right ventricular enlargement with decrease ventricular systolic function...estimated rt ventricle systolic pre equals 113mmhg, ---\par \par ECHO 2018----  EF80, [id number]\par \par SEPT 2104  6MWT- WALKED 301 METERS-------PSG   2014   LOW  T90 BUT NO ERICA\par \par \par \par  june 2015---SAW DR FREEMAN  AT [place]  -cath showed   elevated  pressures but  pa ANGIOgram   should only distal clts  not a candidate for  surgery for CTEPH---RA 4 mmhg, RV 46/5, PA 49/24 mean PA 33, PCWP 13   WITH SUGGESTION OF VASODILATOR  RESPONSE  [ D/W DR LUONG]------AS PER HEATH FREEMAN  SHE HAS GROUP I PAH  WITH DISTAL CLOTS S RECOMMENDED   MEDICAL TREATMENT---PT ON  PROCARDIA  AND  ADMAPAS---DID NOT TOLERATE OPSUMIT IN PAST [[-[ DUE TO LOW BP] --------- -------------------------------.\par \par ALSO ON ANTICOAGULATION-follows coumadin clinic  ------- elevated INR today DENIES BLEEDING/HEMATURIA/MELENA\par \par She reports noncompliance to adempas since 8/1/15 has  basla tachycardia f/u with DR KRISHNAMURTHY  cardilogy ---\par \par ---PFT AUG 2016------FVC 2.44, FEV 1 1.61,   TLC 4.37, mild to moderate obstructive lung defect\par ----jan 2017 6MWD 250 m---\par ---2019  6MWT    ---165 METERS\par \par --------ECHO AUG 2016-----EF 82%-----PASP 26 mmHg\par --CATH 2014 ----MEAN PA 77, LVEDP 10, CO 3.14\par \par ------CT SCAN AUG 2016-------patchy bilateral groundglass unchanged since 6/20/2015-----enlarged PA 3.5 cm-----\par CT SCAN  2018--NO PE, PA 4.0 CM,MOSAIC ATTENUATION\par \par july 2018 s/p hospitalization for syncopal episode at home. She was transferred to [facility] where  a pacemaker was inserted d/t tachycardia and syncope. She was also subsequently started on remodulin SQ by Dr Chow--current rate 12ng. She is still have dyspnea.\par \par ------OCT 2018--------s/p hospitalization at [facility]------S/P PACEMAKER PLACEMENT AT [place]------\par \par november 2019 here for acute visit- PAH w/remodulin @16ng infusing subcutaneously\par she is c/o chronic  palpitations, slightly improved.\par comes in today for cough and wheezing- started zpack and prednisone 2 days ago\par \par \par jan 17 2020- PAH- remodulin @16ng- SQ site - has stopped draining, less painful - completed doxy last week and is now on clindamycin ---\par She is also c/o asthma exacerbation- started prednisone 10mg and using nebs- symptoms somewhat improved from a few days ago\par \par Feb 25, 2020  PAH on Xarelto and Remodulin still at 16ng SQ site. New site looks good without infection. Old site has completely healed. Patient still having intermittent jaw claudication. She has more tachycardia with periods. She has Iron deficiency anemia and has stopped taking her Iron due to constipation. she otherwise has good exercise tolerance. Has oxygen at home and is using it.\par \par JULY 2020-----S/P    LEFT BREAST BIOPSY  -------    PAH ON XARELTO   , REMODULIN 19 NG/KG/MIN ----ON OPSUMIT AND  DIURETICS --  HAS GUM PAIN / \par \par aug 2020 pulm htn on remodulin 19ng no jaw pain, no diarrhea- site clean, kevin\par no further facial swelling - broken tooth right upper gum\par She is c/o worsening DOE_ uses oxygen only at night- off diuretics\par breast mass- s/p biopsy- negative for malignancy\par \par SEPT 2020--pulm htn  REMODULIN TO 21 NG/KG/MIN, using  OXYGEN  at 2 lpm,  ON XARELTO AND DIURETICS----[place] LUNG TRANSPLANT APPOINTMENT   SEPT 21\par Currently on clindamycin for skin infection at old remodulin site\par \par 10/2020- Remodulin now up to 23 NG/KG/MIN, using  OXYGEN  at 2 lpm,  ON XARELTO, now off diuretics. S/p [place] transplant eval pending further testing.  remodulin site is d/c/i. Otherwise respiratory symptoms are stable. c/o palpitations, weakness and dyspnea worse\par \par 11/2020 telehealth phone visit w/pt, Dr Yoon and isidro CARLIN\par Pt is s/p hospitalization- doing well on remodulin 24ng \par \par 11 24 /2020-  telehealth phone visit w/pt, Dr Yoon and isidro NP ------  Pt is s/p hospitalization- doing well on remodulin 26 ng  ----- ON XARELTO,  LASIX AND ALDACTONE  -------LOW DOSE PREDNISONE \par \par DEC 2020----  doing well on remodulin 30  ng  ----- ON XARELTO,  LASIX AND ALDACTONE  -------LOW DOSE PREDNISONE -----is following up with at [facility] for transplant listing-----final decision after the  and psych evaluation------- otherwise feels much better

## 2020-12-22 NOTE — DISCUSSION/SUMMARY
[FreeTextEntry1] : -----Assessment and Plan--------40  year-old lady with PULMONARY ARTERIAL HYPERTENSION  S/P PPM  FOR TACHYCARDIA AT [place] ON S/Q REMODULIN - S/P LEFT BREAST BIOPSY \par   doing well on remodulin 30  ng  ----- ON XARELTO,  LASIX AND ALDACTONE  -------LOW DOSE PREDNISONE -----is following up with at [facility] for transplant listing-----final decision after the  and psych evaluation------- otherwise feels much better\par \par \par 1]   PULM  ART HTN     --------- WHO GROUP I FUNCTIONAL CLASS III ----[ AS PER  DR FREEMAN  WHO DID PULM ANGIO SHE HAS FEATURES OF GROUP I  WITH SOME EVIDENCE OF DISTAL CLOTS] She is on remodulin 30 please tell Eli ng/kg/min  -increase to 25ng ---ON LASIX  20 THREE TIMES A WEEK   AND  ALDACTONE 25 MG  VERY DAY \par 2) of oxygen medically necessary given severe Pulm Htn, anemia and h/o nocturnal desaturation which could worsen Pulm HTN. ADvised oxygen 2 lpm x 24hrs\par 3) keep euvolemic. -----  .  \par 4) ) -She is  on xarelto---will be on lifelong anticoagulation---.\par 5) , -asthma -use nebs \par 6)   --S/P PPM- follows EP clinic at [place] -\par 7) chronic palpitations- seeing Dr HILTON - heart failure clinic\par 8)  she has features of ERICA- including sleep disturbance, nocturnal desat, and excessive fatigue-  HST was negative for ERICA in 2014 but had desaturation. Will try to repeat HST.\par 9)  has consult /[place] Lung Transplant Team -----\par 10) Anemia: -------  . Will monitor for need for transfusions/iron\par 11) breast mass- biopsy negative for malignancy- yearLy mammo advised\par 12)up to date with  influenza vac\par 13) f/u in 6 weeks\par \par \par \par \par   Patient at this time  will follow  the above mentioned recommendations --and f/u in 1 month---If you have any questions  I can be reached  at #  [phone]. \par \par Vivian Yoon MD, FCCP \par Director, Pulmonary Hypertension Program \par [facility] \par Division of Pulmonary, Critical Care and Sleep Medicine \par  Professor of Medicine \par [employer]\par \par

## 2021-01-21 ENCOUNTER — APPOINTMENT (OUTPATIENT)
Dept: OBGYN | Facility: CLINIC | Age: 41
End: 2021-01-21
Payer: MEDICAID

## 2021-01-21 PROCEDURE — 99072 ADDL SUPL MATRL&STAF TM PHE: CPT

## 2021-01-21 PROCEDURE — 99396 PREV VISIT EST AGE 40-64: CPT

## 2021-01-29 ENCOUNTER — APPOINTMENT (OUTPATIENT)
Dept: OBGYN | Facility: CLINIC | Age: 41
End: 2021-01-29

## 2021-01-30 ENCOUNTER — TRANSCRIPTION ENCOUNTER (OUTPATIENT)
Age: 41
End: 2021-01-30

## 2021-02-05 ENCOUNTER — APPOINTMENT (OUTPATIENT)
Dept: OBGYN | Facility: CLINIC | Age: 41
End: 2021-02-05
Payer: MEDICAID

## 2021-02-05 PROCEDURE — 99072 ADDL SUPL MATRL&STAF TM PHE: CPT

## 2021-02-05 PROCEDURE — 58300 INSERT INTRAUTERINE DEVICE: CPT

## 2021-02-09 ENCOUNTER — APPOINTMENT (OUTPATIENT)
Dept: PULMONOLOGY | Facility: CLINIC | Age: 41
End: 2021-02-09
Payer: MEDICAID

## 2021-02-09 ENCOUNTER — LABORATORY RESULT (OUTPATIENT)
Age: 41
End: 2021-02-09

## 2021-02-09 PROCEDURE — 94618 PULMONARY STRESS TESTING: CPT

## 2021-02-09 PROCEDURE — 94729 DIFFUSING CAPACITY: CPT

## 2021-02-09 PROCEDURE — ZZZZZ: CPT

## 2021-02-09 PROCEDURE — 94726 PLETHYSMOGRAPHY LUNG VOLUMES: CPT

## 2021-02-09 PROCEDURE — 94010 BREATHING CAPACITY TEST: CPT

## 2021-02-09 PROCEDURE — 99072 ADDL SUPL MATRL&STAF TM PHE: CPT

## 2021-02-09 PROCEDURE — 99215 OFFICE O/P EST HI 40 MIN: CPT | Mod: 25

## 2021-02-09 NOTE — HISTORY OF PRESENT ILLNESS
[TextBox_4] : ------Patient is here for follow up of her pulmonary htn. ------------------INITIALLY  REFD  WITH   ECHO [DONE  OUTSIDE ] ELEVATED PASP  DILATED RV AND RA---------  HAS BEEN TOLD IN THE PAST SHE HAS  ASTHMA-----long-standing history of dyspnea on exertion-----------------has baseline tachycardia ----.....No history of fever, chills , rigors, chest pain, or hemoptysis. No h/o significant weight loss in last few months. No history of liver dysfunction , collagen vascular disorder or chronic thromboembolic disease. I would classify her dyspnea as WHO  FUNCTIONAL CLASS III-----------no calf tenderness----------SLEEP STUDY SHOWS AHI 0.6 but T 90  < 35 %-----DIAGNOSED  AS PAH ---------WAS ON ADMAPAS  NOW ON   SQ REMODULIN  [ SINCE MAY 2108]  ---S/P PACEMAKER PLACEMENT AT [place]  AND ON METOPROLOL------\par \par ------cardiac CATH ---- 28/ july / 2014.......PAP -76 , Aortic pressure -84, left ventricle edp 10 MMHG , rt atrium - 13, PAP after NO - 42--- CO 3.19,   PVR 1928.78 ( dsc) 24.12 ( woods) PVR after NO ( 692.51( dsc), 8.66 ( enrique)\par \par CARDIAC  CATH 2108-----\par --CTA CHEST---- july 23/2014--- no evidence of PE. non specific ground glass opacities throughout the lungs....moderate pericardial effusion ---\par --repeat CT chest WITHOUT CONTRAST  12/2014 MOSAIC PATTERN UNCHANGED  FROM BEFORE\par vq scan 6/2015 abnormal- low prob PE\par cxr-6/2015 no acute disease \par ---------CT CHEST-----1. Enlarged main pulmonary artery compatible with pulmonary \par hypertension. -2. Patchy bilateral groundglass are unchanged since 06/20/2015 and are of \par uncertain origin. ------       \par \par ECHO ....July 24/ 2014----right ventricular enlargement with decrease ventricular systolic function...estimated rt ventricle systolic pre equals 113mmhg, ---\par \par ECHO 2018----  EF80, [id number]\par \par SEPT 2104  6MWT- WALKED 301 METERS-------PSG   2014   LOW  T90 BUT NO ERICA\par \par \par \par  june 2015---SAW DR FREEMAN  AT [place]  -cath showed   elevated  pressures but  pa ANGIOgram   should only distal clts  not a candidate for  surgery for CTEPH---RA 4 mmhg, RV 46/5, PA 49/24 mean PA 33, PCWP 13   WITH SUGGESTION OF VASODILATOR  RESPONSE  [ D/W DR LUONG]------AS PER HEATH FREEMAN  SHE HAS GROUP I PAH  WITH DISTAL CLOTS S RECOMMENDED   MEDICAL TREATMENT---PT ON  PROCARDIA  AND  ADMAPAS---DID NOT TOLERATE OPSUMIT IN PAST [[-[ DUE TO LOW BP] --------- -------------------------------.\par \par ALSO ON ANTICOAGULATION-follows coumadin clinic  ------- elevated INR today DENIES BLEEDING/HEMATURIA/MELENA\par \par She reports noncompliance to adempas since 8/1/15 has  basla tachycardia f/u with DR KRISHNAMURTHY  cardilogy ---\par \par ---PFT AUG 2016------FVC 2.44, FEV 1 1.61,   TLC 4.37, mild to moderate obstructive lung defect\par ----jan 2017 6MWD 250 m---\par ---2019  6MWT    ---165 METERS\par \par --------ECHO AUG 2016-----EF 82%-----PASP 26 mmHg\par --CATH 2014 ----MEAN PA 77, LVEDP 10, CO 3.14\par \par ------CT SCAN AUG 2016-------patchy bilateral groundglass unchanged since 6/20/2015-----enlarged PA 3.5 cm-----\par CT SCAN  2018--NO PE, PA 4.0 CM,MOSAIC ATTENUATION\par \par july 2018 s/p hospitalization for syncopal episode at home. She was transferred to [facility] where  a pacemaker was inserted d/t tachycardia and syncope. She was also subsequently started on remodulin SQ by Dr Chow--current rate 12ng. She is still have dyspnea.\par \par ------OCT 2018--------s/p hospitalization at [facility]------S/P PACEMAKER PLACEMENT AT [place]------\par \par november 2019 here for acute visit- PAH w/remodulin @16ng infusing subcutaneously\par she is c/o chronic  palpitations, slightly improved.\par comes in today for cough and wheezing- started zpack and prednisone 2 days ago\par \par \par jan 17 2020- PAH- remodulin @16ng- SQ site - has stopped draining, less painful - completed doxy last week and is now on clindamycin ---\par She is also c/o asthma exacerbation- started prednisone 10mg and using nebs- symptoms somewhat improved from a few days ago\par \par Feb 25, 2020  PAH on Xarelto and Remodulin still at 16ng SQ site. New site looks good without infection. Old site has completely healed. Patient still having intermittent jaw claudication. She has more tachycardia with periods. She has Iron deficiency anemia and has stopped taking her Iron due to constipation. she otherwise has good exercise tolerance. Has oxygen at home and is using it.\par \par JULY 2020-----S/P    LEFT BREAST BIOPSY  -------    PAH ON XARELTO   , REMODULIN 19 NG/KG/MIN ----ON OPSUMIT AND  DIURETICS --  HAS GUM PAIN / \par \par aug 2020 pulm htn on remodulin 19ng no jaw pain, no diarrhea- site clean, kevin\par no further facial swelling - broken tooth right upper gum\par She is c/o worsening DOE_ uses oxygen only at night- off diuretics\par breast mass- s/p biopsy- negative for malignancy\par \par SEPT 2020--pulm htn  REMODULIN TO 21 NG/KG/MIN, using  OXYGEN  at 2 lpm,  ON XARELTO AND DIURETICS----[place] LUNG TRANSPLANT APPOINTMENT   SEPT 21\par Currently on clindamycin for skin infection at old remodulin site\par \par 10/2020- Remodulin now up to 23 NG/KG/MIN, using  OXYGEN  at 2 lpm,  ON XARELTO, now off diuretics. S/p [place] transplant eval pending further testing.  remodulin site is d/c/i. Otherwise respiratory symptoms are stable. c/o palpitations, weakness and dyspnea worse\par \par 11/2020 telehealth phone visit w/pt, Dr Yoon and isidro NP\par Pt is s/p hospitalization- doing well on remodulin 24ng \par \par 11 24 /2020-  telehealth phone visit w/pt, Dr Yoon and isidro NP ------  Pt is s/p hospitalization- doing well on remodulin 26 ng  ----- ON XARELTO,  LASIX AND ALDACTONE  -------LOW DOSE PREDNISONE \par \par DEC 2020----  doing well on remodulin 30  ng  ----- ON XARELTO,  LASIX AND ALDACTONE  -------LOW DOSE PREDNISONE -----is following up with at [facility] for transplant listing-----final decision after the  and psych evaluation------- otherwise feels much better  \par \par FEB 2021-----WILL INCREASE REMODULIN TO 32 NG/KG/MIN ----------   ON XARELTO,  LASIX AND ALDACTONE  -------LOW DOSE PREDNISONE -----is following up with at [facility] for transplant listing-----final decision after the  and psych evaluation------- otherwise feels much better

## 2021-02-09 NOTE — DISCUSSION/SUMMARY
[FreeTextEntry1] : -----Assessment and Plan--------40  year-old lady with PULMONARY ARTERIAL HYPERTENSION  S/P PPM  FOR TACHYCARDIA AT [place] ON S/Q REMODULIN - S/P LEFT BREAST BIOPSY \par   doing well on remodulin 30  ng  ----- ON XARELTO,  LASIX AND ALDACTONE  -------LOW DOSE PREDNISONE -----is following up with at [facility] for transplant listing-----final decision after the  and psych evaluation------- otherwise feels much better\par \par \par 1]   PULM  ART HTN     --------- WHO GROUP I FUNCTIONAL CLASS III ----[ AS PER  DR FREEMAN  WHO DID PULM ANGIO SHE HAS FEATURES OF GROUP I  WITH SOME EVIDENCE OF DISTAL CLOTS] She is on remodulin 30 please tell Eli ng/kg/min  -increase to 32 ng ---ON LASIX  20 THREE TIMES A WEEK   AND  ALDACTONE 25 MG  VERY DAY \par 2) of oxygen medically necessary given severe Pulm Htn, anemia and h/o nocturnal desaturation which could worsen Pulm HTN. ADvised oxygen 2 lpm x 24hrs\par 3) keep euvolemic. -----  .  \par 4) ) -She is  on xarelto---will be on lifelong anticoagulation---.\par 5) , -asthma -use nebs \par 6)   --S/P PPM- follows EP clinic at [place] -\par 7) chronic palpitations- seeing Dr HILTON - heart failure clinic\par 8)  she has features of ERICA- including sleep disturbance, nocturnal desat, and excessive fatigue-  HST was negative for ERICA in 2014 but had desaturation. Will try to repeat HST.\par 9)  has consult /[place] [facility] Transplant Team -----\par 10) Anemia: -------  . Will monitor for need for transfusions/iron\par 11) breast mass- biopsy negative for malignancy- yearLy mammo advised\par 12)up to date with  influenza vac\par 13) f/u in  8 weeks\par APRIL 3 WEEK\par \par \par \par \par   Patient at this time  will follow  the above mentioned recommendations --and f/u in 1 month---If you have any questions  I can be reached  at #  [phone]. \par \par Vivian Yoon MD, FCCP \par Director, Pulmonary Hypertension Program \par [facility] \par Division of Pulmonary, Critical Care and Sleep Medicine \par  Professor of Medicine \par [employer]\par \par

## 2021-02-10 LAB
ALBUMIN SERPL ELPH-MCNC: 4.2 G/DL
ALP BLD-CCNC: 68 U/L
ALT SERPL-CCNC: 10 U/L
ANION GAP SERPL CALC-SCNC: 13 MMOL/L
AST SERPL-CCNC: 14 U/L
BASOPHILS # BLD AUTO: 0.1 K/UL
BASOPHILS NFR BLD AUTO: 1.1 %
BILIRUB SERPL-MCNC: 0.5 MG/DL
BUN SERPL-MCNC: 8 MG/DL
CALCIUM SERPL-MCNC: 8.6 MG/DL
CHLORIDE SERPL-SCNC: 103 MMOL/L
CO2 SERPL-SCNC: 21 MMOL/L
CREAT SERPL-MCNC: 1.07 MG/DL
EOSINOPHIL # BLD AUTO: 0.32 K/UL
EOSINOPHIL NFR BLD AUTO: 3.5 %
FERRITIN SERPL-MCNC: 6 NG/ML
GLUCOSE SERPL-MCNC: 84 MG/DL
HCT VFR BLD CALC: 38 %
HGB BLD-MCNC: 10.6 G/DL
IMM GRANULOCYTES NFR BLD AUTO: 0.3 %
LYMPHOCYTES # BLD AUTO: 2.23 K/UL
LYMPHOCYTES NFR BLD AUTO: 24.5 %
MAN DIFF?: NORMAL
MCHC RBC-ENTMCNC: 17.7 PG
MCHC RBC-ENTMCNC: 27.9 GM/DL
MCV RBC AUTO: 63.4 FL
MONOCYTES # BLD AUTO: 0.56 K/UL
MONOCYTES NFR BLD AUTO: 6.1 %
NEUTROPHILS # BLD AUTO: 5.88 K/UL
NEUTROPHILS NFR BLD AUTO: 64.5 %
NT-PROBNP SERPL-MCNC: 40 PG/ML
PLATELET # BLD AUTO: 494 K/UL
POTASSIUM SERPL-SCNC: 4.5 MMOL/L
PROT SERPL-MCNC: 7.3 G/DL
RBC # BLD: 5.99 M/UL
RBC # FLD: 21.5 %
SODIUM SERPL-SCNC: 137 MMOL/L
WBC # FLD AUTO: 9.12 K/UL

## 2021-03-02 DIAGNOSIS — M54.9 DORSALGIA, UNSPECIFIED: ICD-10-CM

## 2021-03-05 ENCOUNTER — APPOINTMENT (OUTPATIENT)
Dept: ANTEPARTUM | Facility: CLINIC | Age: 41
End: 2021-03-05
Payer: MEDICAID

## 2021-03-05 PROCEDURE — 76376 3D RENDER W/INTRP POSTPROCES: CPT

## 2021-03-05 PROCEDURE — 76856 US EXAM PELVIC COMPLETE: CPT

## 2021-03-05 PROCEDURE — 99072 ADDL SUPL MATRL&STAF TM PHE: CPT

## 2021-03-05 PROCEDURE — 76830 TRANSVAGINAL US NON-OB: CPT

## 2021-03-22 ENCOUNTER — NON-APPOINTMENT (OUTPATIENT)
Age: 41
End: 2021-03-22

## 2021-04-01 NOTE — H&P ADULT - PROBLEM/PLAN-8
Remember, avoid any aspirin, advil, aleve, diclofenac, ibuprofen, indomethacin, naproxen, motrin, excedrin, meloxicam for a week before surgery. These can make you bleed more than the surgeon wants.    We'll talk soon!   DISPLAY PLAN FREE TEXT

## 2021-04-02 ENCOUNTER — RX RENEWAL (OUTPATIENT)
Age: 41
End: 2021-04-02

## 2021-04-06 ENCOUNTER — APPOINTMENT (OUTPATIENT)
Dept: PULMONOLOGY | Facility: CLINIC | Age: 41
End: 2021-04-06
Payer: MEDICAID

## 2021-04-06 DIAGNOSIS — E53.8 DEFICIENCY OF OTHER SPECIFIED B GROUP VITAMINS: ICD-10-CM

## 2021-04-06 LAB
25(OH)D3 SERPL-MCNC: 11.4 NG/ML
ALBUMIN SERPL ELPH-MCNC: 4.5 G/DL
ALP BLD-CCNC: 64 U/L
ALT SERPL-CCNC: 10 U/L
ANION GAP SERPL CALC-SCNC: 14 MMOL/L
AST SERPL-CCNC: 15 U/L
BASOPHILS # BLD AUTO: 0.09 K/UL
BASOPHILS NFR BLD AUTO: 0.6 %
BILIRUB SERPL-MCNC: 0.4 MG/DL
BUN SERPL-MCNC: 10 MG/DL
CALCIUM SERPL-MCNC: 9.5 MG/DL
CHLORIDE SERPL-SCNC: 102 MMOL/L
CO2 SERPL-SCNC: 25 MMOL/L
COVID-19 NUCLEOCAPSID  GAM ANTIBODY INTERPRETATION: NEGATIVE
CREAT SERPL-MCNC: 1.12 MG/DL
EOSINOPHIL # BLD AUTO: 0.37 K/UL
EOSINOPHIL NFR BLD AUTO: 2.5 %
FERRITIN SERPL-MCNC: 309 NG/ML
GLUCOSE SERPL-MCNC: 96 MG/DL
HCT VFR BLD CALC: 38.9 %
HGB BLD-MCNC: 10.8 G/DL
IMM GRANULOCYTES NFR BLD AUTO: 0.3 %
LYMPHOCYTES # BLD AUTO: 2.89 K/UL
LYMPHOCYTES NFR BLD AUTO: 19.2 %
MAN DIFF?: NORMAL
MCHC RBC-ENTMCNC: 18 PG
MCHC RBC-ENTMCNC: 27.8 GM/DL
MCV RBC AUTO: 64.7 FL
MONOCYTES # BLD AUTO: 0.86 K/UL
MONOCYTES NFR BLD AUTO: 5.7 %
NEUTROPHILS # BLD AUTO: 10.81 K/UL
NEUTROPHILS NFR BLD AUTO: 71.7 %
NT-PROBNP SERPL-MCNC: 61 PG/ML
PLATELET # BLD AUTO: 453 K/UL
POTASSIUM SERPL-SCNC: 3.6 MMOL/L
PROT SERPL-MCNC: 7 G/DL
RBC # BLD: 6.01 M/UL
RBC # FLD: 23.9 %
SARS-COV-2 AB SERPL QL IA: 0.08 INDEX
SODIUM SERPL-SCNC: 141 MMOL/L
TSH SERPL-ACNC: 2.16 UIU/ML
VIT B12 SERPL-MCNC: 309 PG/ML
WBC # FLD AUTO: 15.06 K/UL

## 2021-04-06 PROCEDURE — 99072 ADDL SUPL MATRL&STAF TM PHE: CPT

## 2021-04-06 PROCEDURE — 99215 OFFICE O/P EST HI 40 MIN: CPT | Mod: 25

## 2021-04-06 RX ORDER — MICONAZOLE NITRATE 2 G/100G
2 CREAM TOPICAL TWICE DAILY
Qty: 1 | Refills: 0 | Status: ACTIVE | COMMUNITY
Start: 2021-04-06 | End: 1900-01-01

## 2021-04-06 RX ORDER — HYDROCORTISONE 25 MG/G
2.5 CREAM TOPICAL
Qty: 1 | Refills: 1 | Status: ACTIVE | COMMUNITY
Start: 2021-04-06 | End: 1900-01-01

## 2021-04-06 NOTE — DISCUSSION/SUMMARY
[FreeTextEntry1] : -----Assessment and Plan--------40  year-old lady with PULMONARY ARTERIAL HYPERTENSION  S/P PPM  FOR TACHYCARDIA AT [place] ON S/Q REMODULIN - S/P LEFT BREAST BIOPSY \par   doing well on remodulin 30  ng  ----- ON XARELTO,  LASIX AND ALDACTONE  -------LOW DOSE PREDNISONE -----is following up with at [facility] for transplant listing-----final decision after the  and psych evaluation------- otherwise feels much better\par \par \par 1]   PULM  ART HTN     --------- WHO GROUP I FUNCTIONAL CLASS III ----[ AS PER  DR FREEMAN  WHO DID PULM ANGIO SHE HAS FEATURES OF GROUP I  WITH SOME EVIDENCE OF DISTAL CLOTS] She is on remodulin 32 please tell Eli ng/kg/min  -increase to 33 ng ---ON LASIX  20 THREE TIMES A WEEK   AND  ALDACTONE 25 MG  VERY DAY -also on sildeanfil \par 2) of oxygen medically necessary given severe Pulm Htn, anemia and h/o nocturnal desaturation which could worsen Pulm HTN. ADvised oxygen 2 lpm x 24hrs\par 3) keep euvolemic. -----  .  \par 4) ) -She is  on xarelto---will be on lifelong anticoagulation---.\par 5) , -asthma -use nebs \par 6)   --S/P PPM- follows EP clinic at [place] -\par 7) chronic palpitations- seeing Dr HILTON - heart failure clinic\par 8)  she has features of ERICA- including sleep disturbance, nocturnal desat, and excessive fatigue-  HST was negative for ERICA in 2014 but had desaturation. Will try to repeat HST.\par 9)  has consult /[place] [facility] Transplant Team -----\par 10) Anemia: -------  . Will monitor for need for transfusions/iron\par 11) breast mass- biopsy negative for malignancy- yearLy mammo advised\par 12)up to date with  influenza vac\par 13) f/u in  8 weeks\par june/ july  3 WEEK\par \par \par \par \par   Patient at this time  will follow  the above mentioned recommendations --and f/u in 1 month---If you have any questions  I can be reached  at #  [phone]. \par \par Vivian Yoon MD, FCCP \par Director, Pulmonary Hypertension Program \par [facility] \par Division of Pulmonary, Critical Care and Sleep Medicine \par  Professor of Medicine \par [employer]\par \par

## 2021-04-06 NOTE — HISTORY OF PRESENT ILLNESS
[TextBox_4] : ------Patient is here for follow up of her pulmonary htn. ------------------INITIALLY  REFD  WITH   ECHO [DONE  OUTSIDE ] ELEVATED PASP  DILATED RV AND RA---------  HAS BEEN TOLD IN THE PAST SHE HAS  ASTHMA-----long-standing history of dyspnea on exertion-----------------has baseline tachycardia ----.....No history of fever, chills , rigors, chest pain, or hemoptysis. No h/o significant weight loss in last few months. No history of liver dysfunction , collagen vascular disorder or chronic thromboembolic disease. I would classify her dyspnea as WHO  FUNCTIONAL CLASS III-----------no calf tenderness----------SLEEP STUDY SHOWS AHI 0.6 but T 90  < 35 %-----DIAGNOSED  AS PAH ---------WAS ON ADMAPAS  NOW ON   SQ REMODULIN  [ SINCE MAY 2108]  ---S/P PACEMAKER PLACEMENT AT [place]  AND ON METOPROLOL------\par \par ------cardiac CATH ---- 28/ july / 2014.......PAP -76 , Aortic pressure -84, left ventricle edp 10 MMHG , rt atrium - 13, PAP after NO - 42--- CO 3.19,   PVR 1928.78 ( dsc) 24.12 ( woods) PVR after NO ( 692.51( dsc), 8.66 ( enrique)\par \par CARDIAC  CATH 2108-----\par --CTA CHEST---- july 23/2014--- no evidence of PE. non specific ground glass opacities throughout the lungs....moderate pericardial effusion ---\par --repeat CT chest WITHOUT CONTRAST  12/2014 MOSAIC PATTERN UNCHANGED  FROM BEFORE\par vq scan 6/2015 abnormal- low prob PE\par cxr-6/2015 no acute disease \par ---------CT CHEST-----1. Enlarged main pulmonary artery compatible with pulmonary \par hypertension. -2. Patchy bilateral groundglass are unchanged since 06/20/2015 and are of \par uncertain origin. ------       \par \par ECHO ....July 24/ 2014----right ventricular enlargement with decrease ventricular systolic function...estimated rt ventricle systolic pre equals 113mmhg, ---\par \par ECHO 2018----  EF80, [id number]\par \par SEPT 2104  6MWT- WALKED 301 METERS-------PSG   2014   LOW  T90 BUT NO ERICA\par \par \par \par  june 2015---SAW DR FREEMAN  AT [place]  -cath showed   elevated  pressures but  pa ANGIOgram   should only distal clts  not a candidate for  surgery for CTEPH---RA 4 mmhg, RV 46/5, PA 49/24 mean PA 33, PCWP 13   WITH SUGGESTION OF VASODILATOR  RESPONSE  [ D/W DR LUONG]------AS PER HEATH FREEMAN  SHE HAS GROUP I PAH  WITH DISTAL CLOTS S RECOMMENDED   MEDICAL TREATMENT---PT ON  PROCARDIA  AND  ADMAPAS---DID NOT TOLERATE OPSUMIT IN PAST [[-[ DUE TO LOW BP] --------- -------------------------------.\par \par ALSO ON ANTICOAGULATION-follows coumadin clinic  ------- elevated INR today DENIES BLEEDING/HEMATURIA/MELENA\par \par She reports noncompliance to adempas since 8/1/15 has  basla tachycardia f/u with DR KRISHNAMURTHY  cardilogy ---\par \par ---PFT AUG 2016------FVC 2.44, FEV 1 1.61,   TLC 4.37, mild to moderate obstructive lung defect\par ----jan 2017 6MWD 250 m---\par ---2019  6MWT    ---165 METERS\par \par --------ECHO AUG 2016-----EF 82%-----PASP 26 mmHg\par --CATH 2014 ----MEAN PA 77, LVEDP 10, CO 3.14\par \par ------CT SCAN AUG 2016-------patchy bilateral groundglass unchanged since 6/20/2015-----enlarged PA 3.5 cm-----\par CT SCAN  2018--NO PE, PA 4.0 CM,MOSAIC ATTENUATION\par \par july 2018 s/p hospitalization for syncopal episode at home. She was transferred to [facility] where  a pacemaker was inserted d/t tachycardia and syncope. She was also subsequently started on remodulin SQ by Dr Chow--current rate 12ng. She is still have dyspnea.\par \par ------OCT 2018--------s/p hospitalization at [facility]------S/P PACEMAKER PLACEMENT AT [place]------\par \par november 2019 here for acute visit- PAH w/remodulin @16ng infusing subcutaneously\par she is c/o chronic  palpitations, slightly improved.\par comes in today for cough and wheezing- started zpack and prednisone 2 days ago\par \par \par jan 17 2020- PAH- remodulin @16ng- SQ site - has stopped draining, less painful - completed doxy last week and is now on clindamycin ---\par She is also c/o asthma exacerbation- started prednisone 10mg and using nebs- symptoms somewhat improved from a few days ago\par \par Feb 25, 2020  PAH on Xarelto and Remodulin still at 16ng SQ site. New site looks good without infection. Old site has completely healed. Patient still having intermittent jaw claudication. She has more tachycardia with periods. She has Iron deficiency anemia and has stopped taking her Iron due to constipation. she otherwise has good exercise tolerance. Has oxygen at home and is using it.\par \par JULY 2020-----S/P    LEFT BREAST BIOPSY  -------    PAH ON XARELTO   , REMODULIN 19 NG/KG/MIN ----ON OPSUMIT AND  DIURETICS --  HAS GUM PAIN / \par \par aug 2020 pulm htn on remodulin 19ng no jaw pain, no diarrhea- site clean, kevin\par no further facial swelling - broken tooth right upper gum\par She is c/o worsening DOE_ uses oxygen only at night- off diuretics\par breast mass- s/p biopsy- negative for malignancy\par \par SEPT 2020--pulm htn  REMODULIN TO 21 NG/KG/MIN, using  OXYGEN  at 2 lpm,  ON XARELTO AND DIURETICS----[place] LUNG TRANSPLANT APPOINTMENT   SEPT 21\par Currently on clindamycin for skin infection at old remodulin site\par \par 10/2020- Remodulin now up to 23 NG/KG/MIN, using  OXYGEN  at 2 lpm,  ON XARELTO, now off diuretics. S/p [place] transplant eval pending further testing.  remodulin site is d/c/i. Otherwise respiratory symptoms are stable. c/o palpitations, weakness and dyspnea worse\par \par 11/2020 telehealth phone visit w/pt, Dr Yoon and isidro NP\par Pt is s/p hospitalization- doing well on remodulin 24ng \par \par 11 24 /2020-  telehealth phone visit w/pt, Dr Yoon and isidro NP ------  Pt is s/p hospitalization- doing well on remodulin 26 ng  ----- ON XARELTO,  LASIX AND ALDACTONE  -------LOW DOSE PREDNISONE \par \par DEC 2020----  doing well on remodulin 30  ng  ----- ON XARELTO,  LASIX AND ALDACTONE  -------LOW DOSE PREDNISONE -----is following up with at [facility] for transplant listing-----final decision after the  and psych evaluation------- otherwise feels much better  \par \par FEB 2021-----WILL INCREASE REMODULIN TO 32 NG/KG/MIN ----------   ON XARELTO,  LASIX AND ALDACTONE  -------LOW DOSE PREDNISONE -----is following up with at [facility] for transplant listing-----final decision after the  and psych evaluation------- otherwise feels much better\par \par APRIL 2021----WILL INCREASE REMODULIN TO 33 NG/KG/MIN -----AND SILDENAFILC-----   ON XARELTO,  LASIX AND ALDACTONE  -------LOW DOSE PREDNISONE -----is following up with at [facility] for transplant listing-----final decision after the  and psych evaluation------- otherwise feels much better

## 2021-04-08 DIAGNOSIS — Z01.818 ENCOUNTER FOR OTHER PREPROCEDURAL EXAMINATION: ICD-10-CM

## 2021-04-09 ENCOUNTER — APPOINTMENT (OUTPATIENT)
Dept: DISASTER EMERGENCY | Facility: CLINIC | Age: 41
End: 2021-04-09

## 2021-04-14 NOTE — ED ADULT TRIAGE NOTE - ACCOMPANIED BY
Assessment/Plan:  Assessment/Plan   Diagnoses and all orders for this visit:    Injury of right elbow, subsequent encounter  -     XR elbow 3+ vw right; Future      13year-old male with right elbow pain from injury 04/08/2021  Discussed with patient and accompanying  physical exam, radiographs, impression and plan  X-rays the right elbow are unremarkable for appreciates osseous abnormality  There is still appreciation of anterior sail sign, which indicates intra-articular fluid  Physical exam the elbow is unremarkable for bony or soft tissue tenderness  He has mild limited range of motion with endpoint flexion and extension limited to -10 compared to the contralateral elbow  He has intact strength of the elbow and forearm  I discussed with patient and  that even though x-rays are unremarkable for gross osseous abnormality, there is still possibility of fracture the elbow  I recommend he continue with use of arm sling to prevent using the arm /worsening of injury  He may remove the sling for sleep and hygiene purposes  He will follow up in 3 weeks at which point we will repeat x-rays of the right elbow and he will be re-evaluated  Subjective:   Patient ID: Alec Singh is a 13 y o  male  Chief Complaint   Patient presents with    Right Elbow - Pain, Follow-up        77-year-old male is a complex  for follow-up of right elbow pain of onset from injury on 04/08/2021  He was last seen by Dr Esa Thompson on 04/09/2021 at which point x-rays revealed anterior so sign and was concern for occult fracture  He was placed in posterior long-arm splint and advised to follow up for repeat imaging  Today reports significant improvement and currently denies any pain of the elbow  Also reports improvement in range of motion  Elbow Pain  This is a new problem  The current episode started in the past 7 days  The problem has been rapidly improving   Pertinent negatives include no arthralgias, joint swelling, numbness or weakness  Nothing aggravates the symptoms  He has tried rest, immobilization and NSAIDs for the symptoms  The treatment provided significant relief  Review of Systems   Musculoskeletal: Negative for arthralgias and joint swelling  Neurological: Negative for weakness and numbness  Objective:  Vitals:    04/14/21 1030   BP: (!) 124/81   BP Location: Left arm   Patient Position: Sitting   Cuff Size: Adult   Pulse: (!) 56   Weight: 93 4 kg (206 lb)     Right Hand Exam     Muscle Strength   The patient has normal right wrist strength  Other   Sensation: normal  Pulse: present      Right Elbow Exam     Tenderness   The patient is experiencing no tenderness  Range of Motion   Extension: -10   Flexion: 120   Pronation: normal   Supination: normal     Muscle Strength   The patient has normal right elbow strength ( 5/5 flexion and extension)  Tests   Varus: negative  Valgus: negative  Tinel's sign (cubital tunnel): negative    Other   Sensation: normal      Left Elbow Exam     Range of Motion   Flexion: 130           Strength/Myotome Testing     Right Wrist/Hand   Normal wrist strength      Physical Exam  Vitals signs and nursing note reviewed  Constitutional:       General: He is not in acute distress  Appearance: He is well-developed  HENT:      Head: Normocephalic and atraumatic  Right Ear: External ear normal       Left Ear: External ear normal    Eyes:      Conjunctiva/sclera: Conjunctivae normal    Neck:      Trachea: No tracheal deviation  Cardiovascular:      Comments:  bradycardic  Pulmonary:      Effort: Pulmonary effort is normal  No respiratory distress  Abdominal:      General: There is no distension  Musculoskeletal:         General: No swelling or tenderness  Skin:     General: Skin is warm and dry  Neurological:      Mental Status: He is alert and oriented to person, place, and time     Psychiatric: Behavior: Behavior normal          I have personally reviewed pertinent films in PACS and my interpretation is   No acute osseous abnormality of the right elbow  Anterior sail sign  EMT/paramedic

## 2021-04-29 ENCOUNTER — OUTPATIENT (OUTPATIENT)
Dept: OUTPATIENT SERVICES | Facility: HOSPITAL | Age: 41
LOS: 1 days | End: 2021-04-29
Payer: MEDICAID

## 2021-04-29 VITALS
DIASTOLIC BLOOD PRESSURE: 70 MMHG | TEMPERATURE: 98 F | HEIGHT: 65 IN | WEIGHT: 218.04 LBS | OXYGEN SATURATION: 98 % | SYSTOLIC BLOOD PRESSURE: 100 MMHG | HEART RATE: 77 BPM | RESPIRATION RATE: 16 BRPM

## 2021-04-29 DIAGNOSIS — Z98.51 TUBAL LIGATION STATUS: Chronic | ICD-10-CM

## 2021-04-29 DIAGNOSIS — R22.9 LOCALIZED SWELLING, MASS AND LUMP, UNSPECIFIED: ICD-10-CM

## 2021-04-29 DIAGNOSIS — I26.99 OTHER PULMONARY EMBOLISM WITHOUT ACUTE COR PULMONALE: ICD-10-CM

## 2021-04-29 DIAGNOSIS — Z95.0 PRESENCE OF CARDIAC PACEMAKER: Chronic | ICD-10-CM

## 2021-04-29 DIAGNOSIS — D21.22 BENIGN NEOPLASM OF CONNECTIVE AND OTHER SOFT TISSUE OF LEFT LOWER LIMB, INCLUDING HIP: ICD-10-CM

## 2021-04-29 LAB
ALBUMIN SERPL ELPH-MCNC: 4.2 G/DL — SIGNIFICANT CHANGE UP (ref 3.3–5)
ALP SERPL-CCNC: 58 U/L — SIGNIFICANT CHANGE UP (ref 40–120)
ALT FLD-CCNC: 8 U/L — SIGNIFICANT CHANGE UP (ref 4–33)
ANION GAP SERPL CALC-SCNC: 12 MMOL/L — SIGNIFICANT CHANGE UP (ref 7–14)
AST SERPL-CCNC: 10 U/L — SIGNIFICANT CHANGE UP (ref 4–32)
BILIRUB SERPL-MCNC: 0.4 MG/DL — SIGNIFICANT CHANGE UP (ref 0.2–1.2)
BUN SERPL-MCNC: 12 MG/DL — SIGNIFICANT CHANGE UP (ref 7–23)
CALCIUM SERPL-MCNC: 8.6 MG/DL — SIGNIFICANT CHANGE UP (ref 8.4–10.5)
CHLORIDE SERPL-SCNC: 103 MMOL/L — SIGNIFICANT CHANGE UP (ref 98–107)
CO2 SERPL-SCNC: 24 MMOL/L — SIGNIFICANT CHANGE UP (ref 22–31)
CREAT SERPL-MCNC: 1.05 MG/DL — SIGNIFICANT CHANGE UP (ref 0.5–1.3)
GLUCOSE SERPL-MCNC: 82 MG/DL — SIGNIFICANT CHANGE UP (ref 70–99)
HCT VFR BLD CALC: 41.4 % — SIGNIFICANT CHANGE UP (ref 34.5–45)
HGB BLD-MCNC: 12.1 G/DL — SIGNIFICANT CHANGE UP (ref 11.5–15.5)
MCHC RBC-ENTMCNC: 19.5 PG — LOW (ref 27–34)
MCHC RBC-ENTMCNC: 29.2 GM/DL — LOW (ref 32–36)
MCV RBC AUTO: 66.7 FL — LOW (ref 80–100)
NRBC # BLD: 0 /100 WBCS — SIGNIFICANT CHANGE UP
NRBC # FLD: 0 K/UL — SIGNIFICANT CHANGE UP
PLATELET # BLD AUTO: 380 K/UL — SIGNIFICANT CHANGE UP (ref 150–400)
POTASSIUM SERPL-MCNC: 3.4 MMOL/L — LOW (ref 3.5–5.3)
POTASSIUM SERPL-SCNC: 3.4 MMOL/L — LOW (ref 3.5–5.3)
PROT SERPL-MCNC: 7.1 G/DL — SIGNIFICANT CHANGE UP (ref 6–8.3)
RBC # BLD: 6.21 M/UL — HIGH (ref 3.8–5.2)
RBC # FLD: 27.1 % — HIGH (ref 10.3–14.5)
SODIUM SERPL-SCNC: 139 MMOL/L — SIGNIFICANT CHANGE UP (ref 135–145)
WBC # BLD: 10.61 K/UL — HIGH (ref 3.8–10.5)
WBC # FLD AUTO: 10.61 K/UL — HIGH (ref 3.8–10.5)

## 2021-04-29 PROCEDURE — 93010 ELECTROCARDIOGRAM REPORT: CPT

## 2021-04-29 NOTE — H&P PST ADULT - BP NONINVASIVE SYSTOLIC (MM HG)
PICC Home Care Guide    A peripherally inserted central catheter (PICC) is a form of IV access that allows medicines and IV fluids to be quickly distributed throughout the body. The PICC is a long, thin, flexible tube (catheter) that is inserted into a vein in the upper arm. The catheter ends in a large vein in the chest (superior vena cava, or SVC). After the PICC is inserted, a chest X-ray may be done to make sure that it is in the correct place.  A PICC may be placed for different reasons, such as:  · To give medicines and liquid nutrition.  · To give IV fluids and blood products.  · If there is trouble placing a peripheral intravenous (PIV) catheter.  If taken care of properly, a PICC can remain in place for several months. Having a PICC can also allow a person to go home from the hospital sooner. Medicine and PICC care can be managed at home by a family member, caregiver, or home health care team.  What are the risks?  Generally, having a PICC is safe. However, problems may occur, including:  · A blood clot (thrombus) forming in or at the tip of the PICC.  · A blood clot forming in a vein (deep vein thrombosis) or traveling to the lung (pulmonary embolism).  · Inflammation of the vein (phlebitis) in which the PICC is placed.  · Infection. Central line associated blood stream infection (CLABSI) is a serious infection that often requires hospitalization.  · PICC movement (malposition). The PICC tip may move from its original position due to excessive physical activity, forceful coughing, sneezing, or vomiting.  · A break or cut in the PICC. It is important not to use scissors near the PICC.  · Nerve or tendon irritation or injury during PICC insertion.  How to take care of your PICC  Preventing problems  · You and any caregivers should wash your hands often with soap. Wash hands:  ? Before touching the PICC line or the infusion device.  ? Before changing a bandage (dressing).  · Flush the PICC as told by your  health care provider. Let your health care provider know right away if the PICC is hard to flush or does not flush. Do not use force to flush the PICC.  · Do not use a syringe that is less than 10 mL to flush the PICC.  · Avoid blood pressure checks on the arm in which the PICC is placed.  · Never pull or tug on the PICC.  · Do not take the PICC out yourself. Only a trained clinical professional should remove the PICC.  · Use clean and sterile supplies only. Keep the supplies in a dry place. Do not reuse needles, syringes, or any other supplies. Doing that can lead to infection.  · Keep pets and children away from your PICC line.  · Check the PICC insertion site every day for signs of infection. Check for:  ? Leakage.  ? Redness, swelling, or pain.  ? Fluid or blood.  ? Warmth.  ? Pus or a bad smell.  PICC dressing care  · Keep your PICC bandage (dressing) clean and dry to prevent infection.  · Do not take baths, swim, or use a hot tub until your health care provider approves. Ask your health care provider if you can take showers. You may only be allowed to take sponge baths for bathing. When you are allowed to shower:  ? Ask your health care provider to teach you how to wrap the PICC line.  ? Cover the PICC line with clear plastic wrap and tape to keep it dry while showering.  · Follow instructions from your health care provider about how to take care of your insertion site and dressing. Make sure you:  ? Wash your hands with soap and water before you change your bandage (dressing). If soap and water are not available, use hand .  ? Change your dressing as told by your health care provider.  ? Leave stitches (sutures), skin glue, or adhesive strips in place. These skin closures may need to stay in place for 2 weeks or longer. If adhesive strip edges start to loosen and curl up, you may trim the loose edges. Do not remove adhesive strips completely unless your health care provider tells you to do  "that.  · Change your PICC dressing if it becomes loose or wet.  General instructions    · Carry your PICC identification card or wear a medical alert bracelet at all times.  · Keep the tube clamped at all times, unless it is being used.  · Carry a smooth-edge clamp with you at all times to place on the tube if it breaks.  · Do not use scissors or sharp objects near the tube.  · You may bend your arm and move it freely. If your PICC is near or at the bend of your elbow, avoid activity with repeated motion at the elbow.  · Avoid lifting heavy objects as told by your health care provider.  · Keep all follow-up visits as told by your health care provider. This is important.  Disposal of supplies  · Throw away any syringes in a disposal container that is meant for sharp items (sharps container). You can buy a sharps container from a pharmacy, or you can make one by using an empty hard plastic bottle with a cover.  · Place any used dressings or infusion bags into a plastic bag. Throw that bag in the trash.  Contact a health care provider if:  · You have pain in your arm, ear, face, or teeth.  · You have a fever or chills.  · You have redness, swelling, or pain around the insertion site.  · You have fluid or blood coming from the insertion site.  · Your insertion site feels warm to the touch.  · You have pus or a bad smell coming from the insertion site.  · Your skin feels hard and raised around the insertion site.  Get help right away if:  · Your PICC is accidentally pulled all the way out. If this happens, cover the insertion site with a bandage or gauze dressing. Do not throw the PICC away. Your health care provider will need to check it.  · Your PICC was tugged or pulled and has partially come out. Do not  push the PICC back in.  · You cannot flush the PICC, it is hard to flush, or the PICC leaks around the insertion site when it is flushed.  · You hear a \"flushing\" sound when the PICC is flushed.  · You feel your " heart racing or skipping beats.  · There is a hole or tear in the PICC.  · You have swelling in the arm in which the PICC was inserted.  · You have a red streak going up your arm from where the PICC was inserted.  Summary  · A peripherally inserted central catheter (PICC) is a long, thin, flexible tube (catheter) that is inserted into a vein in the upper arm.  · The PICC is inserted using a sterile technique by a specially trained nurse or physician. Only a trained clinical professional should remove it.  · Keep your PICC identification card with you at all times.  · Avoid blood pressure checks on the arm in which the PICC is placed.  · If cared for properly, a PICC can remain in place for several months. Having a PICC can also allow a person to go home from the hospital sooner.  This information is not intended to replace advice given to you by your health care provider. Make sure you discuss any questions you have with your health care provider.  Document Released: 06/23/2004 Document Revised: 01/20/2018 Document Reviewed: 01/20/2018  [employer] Interactive Patient Education © 2019 [employer]     100

## 2021-04-29 NOTE — H&P PST ADULT - NSICDXPASTSURGICALHX_GEN_ALL_CORE_FT
PAST SURGICAL HISTORY:  H/O tubal ligation     History of pacemaker 12/19 - [employer] model Ingevity 4402

## 2021-04-29 NOTE — H&P PST ADULT - SKIN/BREAST COMMENTS
Small left upper thigh mass noted for past 1-2 yrs - now increased in size and causes occasional pain when pressed

## 2021-04-29 NOTE — H&P PST ADULT - OTHER CARE PROVIDERS
Cardio Dr Garcia Cardio Dr Garcia ( from 2020 but has left practice as per patient - given Dr Botello card)

## 2021-04-29 NOTE — H&P PST ADULT - NSICDXPROBLEM_GEN_ALL_CORE_FT
PROBLEM DIAGNOSES  Problem: Skin mass  Assessment and Plan: Pt scheduled for surgery and preop instructions including instructions for taking own GI protection  and for Chlorhexidine use in showering on the day of surgery, given verbally and with use of  written materials, and patient confirming understanding of such instructions using  teach back method.  OR Booking notified of O2 use, ella precautions, Pulmonary HTN/on infusion pump , Pacemaker , IUD, Xarelto   Spoke with Dr South and pt to get CC - pt seen last by Dr Strong who no longer is in same office - pt told to call office for other Cardio or given Dr Botello card - pt aware of need for CC - Echo and Cardiac cath from 7/20 in chart  Call to Dr Barone who agreed patient can stay on Xarelto for surgery   Pt to take Sildenafil, Lasix, Prednisone and Spironolactone am DOS  Requst CC   REquest Pulmonary HTN clearance from Dr Yoon          PROBLEM DIAGNOSES  Problem: Skin mass  Assessment and Plan: Pt scheduled for surgery and preop instructions including instructions for taking own GI protection  and for Chlorhexidine use in showering on the day of surgery, given verbally and with use of  written materials, and patient confirming understanding of such instructions using  teach back method.  OR Booking notified of O2 use, ella precautions, Pulmonary HTN/on infusion pump , Pacemaker , IUD, Xarelto, allergy to PCN, adhesives, and Vancomycin   Spoke with Dr South - she is aware of infusion pump and requested pt  get CC - pt seen last by Dr Strong who no longer is in same office - pt told to call office for other Cardio or given Dr Botello card - pt aware of need for CC - Echo and Cardiac cath from 7/20 in chart  Call to Dr Barone who agreed patient can stay on Xarelto for surgery   Pt to take Sildenafil, Lasix, Prednisone and Spironolactone am DOS  Requst CC   REquest Pulmonary HTN clearance from Dr Yoon

## 2021-04-29 NOTE — H&P PST ADULT - NSICDXPASTMEDICALHX_GEN_ALL_CORE_FT
PAST MEDICAL HISTORY:  Asthma     CAD (coronary artery disease)     H/O pulmonary hypertension     History of tachycardia     On supplemental oxygen by nasal cannula O2 @ 2L    Pacemaker     Pulmonary embolism     Skin mass left upper thigh mass

## 2021-04-29 NOTE — H&P PST ADULT - HISTORY OF PRESENT ILLNESS
Pt is a 40 yr old female scheduled for Excision Left Thigh Mass with Dr Barone tentatively 5/10/21 - Pt c/o of left upper thigh mass noted 1-2 yrs ago and now increased in size with occasional pain with  pressure.  pt hx Pulmonary HTN and followed by Dr Yoon - on O2@2L and Remodulin via infusion pump with varying set basal rates. Pt also had Pacemaker placed at [place] - [employer] model 7741 [employer] MRI EL  Pacemaker placed 2019 and last interrogated while hospitalized at [place] for SOB 3/21 for 3 days. Pt on Xarelto and prednisone from Dr Yoon. Hx asthma , HTN  Pt denies COVID   Pt to have COVID preop test  Pt is a 40 yr old female scheduled for Excision Left Thigh Mass with Dr Barone tentatively 5/10/21 - Pt c/o of left upper thigh mass noted 1-2 yrs ago and now increased in size with occasional pain with  pressure.  pt hx Pulmonary HTN and followed by Dr oYon - on O2@2L and Remodulin via infusion pump with set with varying  rates. Pt also had Pacemaker placed at [place] - [employer] model 7741 Accolade MRI EL  Pacemaker placed 2019 and last interrogated while hospitalized at [place] for SOB 3/21 for 3 days. Pt on Xarelto and prednisone from Dr Yoon, [facility].  Hx asthma , HTN  Pt denies COVID   Pt to have COVID preop test

## 2021-04-29 NOTE — H&P PST ADULT - VENOUS THROMBOEMBOLISM CURRENT STATUS
(1) abnormal pulmonary function (COPD)/(1) serious lung disease, including pneumonia (within 1 month)/(1) other risk factor (includes escalating BMI, pack-years of smoking, diabetes requiring insulin, chemotherapy, female gender and length of surgery)

## 2021-04-29 NOTE — H&P PST ADULT - RESPIRATORY AND THORAX COMMENTS
Hx Asthma and pulmonary HTN - on Remodulin via infusion pump with varying rates set - pt followed by Dr Yoon and on Xarelto , O2@2L and prednisone

## 2021-05-06 RX ORDER — ERGOCALCIFEROL 1.25 MG/1
1.25 MG CAPSULE, LIQUID FILLED ORAL
Qty: 4 | Refills: 3 | Status: ACTIVE | COMMUNITY
Start: 2021-04-06 | End: 1900-01-01

## 2021-05-06 RX ORDER — SPIRONOLACTONE 25 MG/1
25 TABLET ORAL
Qty: 30 | Refills: 2 | Status: DISCONTINUED | COMMUNITY
End: 2021-05-06

## 2021-05-06 RX ORDER — FUROSEMIDE 40 MG/1
40 TABLET ORAL
Qty: 30 | Refills: 0 | Status: DISCONTINUED | COMMUNITY
Start: 2020-11-05 | End: 2021-05-06

## 2021-05-07 ENCOUNTER — APPOINTMENT (OUTPATIENT)
Dept: DISASTER EMERGENCY | Facility: CLINIC | Age: 41
End: 2021-05-07

## 2021-05-27 NOTE — CHART NOTE - NSCHARTNOTEFT_GEN_A_CORE
: Deepa Hastings MD    INDICATION: pulmonary HTN    PROCEDURE:  [x] LIMITED ECHO  [x] LIMITED CHEST  [ ] LIMITED RETROPERITONEAL  [ ] LIMITED ABDOMINAL  [ ] LIMITED DVT  [ ] NEEDLE GUIDANCE VASCULAR  [ ] NEEDLE GUIDANCE THORACENTESIS  [ ] NEEDLE GUIDANCE PARACENTESIS  [ ] NEEDLE GUIDANCE PERICARDIOCENTESIS  [ ] OTHER    FINDINGS:    Chest: A line predominant pattern bilaterally, no pleural effusion.   Heart: Normal LV function. RV is not enlarged. Thickening of the RV free wall. No effusion.       INTERPRETATION: Normal aeration pattern. Normal LV function. Thickened RV free wall. 3

## 2021-07-06 ENCOUNTER — APPOINTMENT (OUTPATIENT)
Dept: PULMONOLOGY | Facility: CLINIC | Age: 41
End: 2021-07-06
Payer: MEDICAID

## 2021-07-12 ENCOUNTER — APPOINTMENT (OUTPATIENT)
Dept: PULMONOLOGY | Facility: CLINIC | Age: 41
End: 2021-07-12
Payer: MEDICAID

## 2021-07-12 VITALS
WEIGHT: 225 LBS | HEART RATE: 111 BPM | TEMPERATURE: 98 F | DIASTOLIC BLOOD PRESSURE: 82 MMHG | SYSTOLIC BLOOD PRESSURE: 129 MMHG | BODY MASS INDEX: 35.24 KG/M2 | RESPIRATION RATE: 16 BRPM | OXYGEN SATURATION: 94 %

## 2021-07-12 PROBLEM — Z87.898 PERSONAL HISTORY OF OTHER SPECIFIED CONDITIONS: Chronic | Status: ACTIVE | Noted: 2021-04-29

## 2021-07-12 PROBLEM — J45.909 UNSPECIFIED ASTHMA, UNCOMPLICATED: Chronic | Status: ACTIVE | Noted: 2021-04-29

## 2021-07-12 PROBLEM — Z78.9 OTHER SPECIFIED HEALTH STATUS: Chronic | Status: ACTIVE | Noted: 2021-04-29

## 2021-07-12 PROBLEM — I26.99 OTHER PULMONARY EMBOLISM WITHOUT ACUTE COR PULMONALE: Chronic | Status: ACTIVE | Noted: 2021-04-29

## 2021-07-12 PROBLEM — I25.10 ATHEROSCLEROTIC HEART DISEASE OF NATIVE CORONARY ARTERY WITHOUT ANGINA PECTORIS: Chronic | Status: ACTIVE | Noted: 2021-04-29

## 2021-07-12 PROBLEM — Z95.0 PRESENCE OF CARDIAC PACEMAKER: Chronic | Status: ACTIVE | Noted: 2021-04-29

## 2021-07-12 PROBLEM — Z86.79 PERSONAL HISTORY OF OTHER DISEASES OF THE CIRCULATORY SYSTEM: Chronic | Status: ACTIVE | Noted: 2021-04-29

## 2021-07-12 PROBLEM — R22.9 LOCALIZED SWELLING, MASS AND LUMP, UNSPECIFIED: Chronic | Status: ACTIVE | Noted: 2021-04-29

## 2021-07-12 PROCEDURE — 99215 OFFICE O/P EST HI 40 MIN: CPT | Mod: 25

## 2021-07-12 NOTE — REVIEW OF SYSTEMS
[Fever] : no fever [Chills] : no chills [Dry Eyes] : no dry eyes [Cough] : no cough [Chest Discomfort] : no chest discomfort [Nasal Discharge] : no nasal discharge [Nocturia] : no nocturia [Arthralgias] : no arthralgias [Raynaud] : no raynaud [Headache] : no headache [Depression] : no depression [Diabetes] : no diabetes

## 2021-07-12 NOTE — DISCUSSION/SUMMARY
[FreeTextEntry1] : -----Assessment and Plan--------40  year-old lady with PULMONARY ARTERIAL HYPERTENSION  S/P PPM  FOR TACHYCARDIA AT [place] ON S/Q REMODULIN - S/P LEFT BREAST BIOPSY \par   doing well on remodulin 30  ng  ----- ON XARELTO,  LASIX AND ALDACTONE  -------LOW DOSE PREDNISONE -----is following up with at [facility] for transplant listing-----final decision after the  and psych evaluation------- otherwise feels much better\par \par \par 1]   PULM  ART HTN     --------- WHO GROUP I FUNCTIONAL CLASS III ----[ AS PER  DR FREEMAN  WHO DID PULM ANGIO SHE HAS FEATURES OF GROUP I  WITH SOME EVIDENCE OF DISTAL CLOTS] She is on remodulin 32 please tell Eli ng/kg/min  -increase to 33 ng ---ON LASIX  20 THREE TIMES A WEEK   AND  ALDACTONE 25 MG  VERY DAY -also on sildeanfil \par 2) of oxygen medically necessary given severe Pulm Htn, anemia and h/o nocturnal desaturation which could worsen Pulm HTN. ADvised oxygen 2 lpm x 24hrs\par 3) keep euvolemic. -----  .  \par 4) ) -She is  on xarelto---will be on lifelong anticoagulation---.\par 5) , -asthma -use nebs \par 6)   --S/P PPM- follows EP clinic at [place] -\par 7) chronic palpitations- -- NEEDS TOSEE   Dr AMBAR HILTON - heart failure clinic\par 8)  she has features of ERICA- including sleep disturbance, nocturnal desat, and excessive fatigue-  HST was negative for ERICA in 2014 but had desaturation. Will try to repeat HST.\par 9)  has consult /[place] [facility] -----\par 10) Anemia: -------  . Will monitor for need for transfusions/iron\par 11) breast mass- biopsy negative for malignancy- yearLy mammo advised\par 12)up to date with  influenza vac\par 13)  LEFT THIGH SKIN LESION ----needs to see Dr. Reynolds\par 14]  f/u in  8 weeks\par june/ july  3 WEEK\par \par \par \par \par   Patient at this time  will follow  the above mentioned recommendations --and f/u in 1 month---If you have any questions  I can be reached  at #  [phone]. \par \par Vivian Yoon MD, [facility] \par Director, Pulmonary Hypertension Program \par [facility] \par Division of Pulmonary, Critical Care and Sleep Medicine \par  Professor of Medicine \par [employer]\par \par

## 2021-07-12 NOTE — HISTORY OF PRESENT ILLNESS
[TextBox_4] : ------Patient is here for follow up of her pulmonary htn. ------------------INITIALLY  REFD  WITH   ECHO [DONE  OUTSIDE ] ELEVATED PASP  DILATED RV AND RA---------  HAS BEEN TOLD IN THE PAST SHE HAS  ASTHMA-----long-standing history of dyspnea on exertion-----------------has baseline tachycardia ----.....No history of fever, chills , rigors, chest pain, or hemoptysis. No h/o significant weight loss in last few months. No history of liver dysfunction , collagen vascular disorder or chronic thromboembolic disease. I would classify her dyspnea as WHO  FUNCTIONAL CLASS III-----------no calf tenderness----------SLEEP STUDY SHOWS AHI 0.6 but T 90  < 35 %-----DIAGNOSED  AS PAH ---------WAS ON ADMAPAS  NOW ON   SQ REMODULIN  [ SINCE MAY 2108]  ---S/P PACEMAKER PLACEMENT AT [facility]  AND ON METOPROLOL------\par \par ------cardiac CATH ---- 28/ july / 2014.......PAP -76 , Aortic pressure -84, left ventricle edp 10 MMHG , rt atrium - 13, PAP after NO - 42--- CO 3.19,   PVR 1928.78 ( dsc) 24.12 ( woods) PVR after NO ( 692.51( dsc), 8.66 ( enrique)\par \par CARDIAC  CATH 2108-----\par --CTA CHEST---- july 23/2014--- no evidence of PE. non specific ground glass opacities throughout the lungs....moderate pericardial effusion ---\par --repeat CT chest WITHOUT CONTRAST  12/2014 MOSAIC PATTERN UNCHANGED  FROM BEFORE\par vq scan 6/2015 abnormal- low prob PE\par cxr-6/2015 no acute disease \par ---------CT CHEST-----1. Enlarged main pulmonary artery compatible with pulmonary \par hypertension. -2. Patchy bilateral groundglass are unchanged since 06/20/2015 and are of \par uncertain origin. ------       \par \par ECHO ....July 24/ 2014----right ventricular enlargement with decrease ventricular systolic function...estimated rt ventricle systolic pre equals 113mmhg, ---\par \par ECHO 2018----  EF80, [id number]\par \par SEPT 2104  6MWT- WALKED 301 METERS-------PSG   2014   LOW  T90 BUT NO ERICA\par \par \par \par  june 2015---SAW DR FREEMAN  AT [facility]  -cath showed   elevated  pressures but  pa ANGIOgram   should only distal clts  not a candidate for  surgery for CTEPH---RA 4 mmhg, RV 46/5, PA 49/24 mean PA 33, PCWP 13   WITH SUGGESTION OF VASODILATOR  RESPONSE  [ D/W DR LUONG]------AS PER HEATH FREEMAN  SHE HAS GROUP I PAH  WITH DISTAL CLOTS S RECOMMENDED   MEDICAL TREATMENT---PT ON  PROCARDIA  AND  ADMAPAS---DID NOT TOLERATE OPSUMIT IN PAST [[-[ DUE TO LOW BP] --------- -------------------------------.\par \par ALSO ON ANTICOAGULATION-follows coumadin clinic  ------- elevated INR today DENIES BLEEDING/HEMATURIA/MELENA\par \par She reports noncompliance to adempas since 8/1/15 has  basla tachycardia f/u with DR KRISHNAMURTHY  cardilogy ---\par \par ---PFT AUG 2016------FVC 2.44, FEV 1 1.61,   TLC 4.37, mild to moderate obstructive lung defect\par ----jan 2017 6MWD 250 m---\par ---2019  6MWT    ---165 METERS\par \par --------ECHO AUG 2016-----EF 82%-----PASP 26 mmHg\par --CATH 2014 ----MEAN PA 77, LVEDP 10, CO 3.14\par \par ------CT SCAN AUG 2016-------patchy bilateral groundglass unchanged since 6/20/2015-----enlarged PA 3.5 cm-----\par CT SCAN  2018--NO PE, PA 4.0 CM,MOSAIC ATTENUATION\par \par july 2018 s/p hospitalization for syncopal episode at home. She was transferred to [facility] where  a pacemaker was inserted d/t tachycardia and syncope. She was also subsequently started on remodulin SQ by Dr Chow--current rate 12ng. She is still have dyspnea.\par \par ------OCT 2018--------s/p hospitalization at [facility]------S/P PACEMAKER PLACEMENT AT [facility]------\par \par november 2019 here for acute visit- PAH w/remodulin @16ng infusing subcutaneously\par she is c/o chronic  palpitations, slightly improved.\par comes in today for cough and wheezing- started zpack and prednisone 2 days ago\par \par \par jan 17 2020- PAH- remodulin @16ng- SQ site - has stopped draining, less painful - completed doxy last week and is now on clindamycin ---\par She is also c/o asthma exacerbation- started prednisone 10mg and using nebs- symptoms somewhat improved from a few days ago\par \par Feb 25, 2020  PAH on Xarelto and Remodulin still at 16ng SQ site. New site looks good without infection. Old site has completely healed. Patient still having intermittent jaw claudication. She has more tachycardia with periods. She has Iron deficiency anemia and has stopped taking her Iron due to constipation. she otherwise has good exercise tolerance. Has oxygen at home and is using it.\par \par JULY 2020-----S/P    LEFT BREAST BIOPSY  -------    PAH ON XARELTO   , REMODULIN 19 NG/KG/MIN ----ON OPSUMIT AND  DIURETICS --  HAS GUM PAIN / \par \par aug 2020 pulm htn on remodulin 19ng no jaw pain, no diarrhea- site clean, kevin\par no further facial swelling - broken tooth right upper gum\par She is c/o worsening DOE_ uses oxygen only at night- off diuretics\par breast mass- s/p biopsy- negative for malignancy\par \par SEPT 2020--pulm htn  REMODULIN TO 21 NG/KG/MIN, using  OXYGEN  at 2 lpm,  ON XARELTO AND DIURETICS----[facility] APPOINTMENT   SEPT 21\par Currently on clindamycin for skin infection at old remodulin site\par \par 10/2020- Remodulin now up to 23 NG/KG/MIN, using  OXYGEN  at 2 lpm,  ON XARELTO, now off diuretics. S/p [facility] transplant eval pending further testing.  remodulin site is d/c/i. Otherwise respiratory symptoms are stable. c/o palpitations, weakness and dyspnea worse\par \par 11/2020 telehealth phone visit w/pt, Dr Yoon and isidro NP\par Pt is s/p hospitalization- doing well on remodulin 24ng \par \par 11 24 /2020-  telehealth phone visit w/pt, Dr Yoon and isidro NP ------  Pt is s/p hospitalization- doing well on remodulin 26 ng  ----- ON XARELTO,  LASIX AND ALDACTONE  -------LOW DOSE PREDNISONE \par \par DEC 2020----  doing well on remodulin 30  ng  ----- ON XARELTO,  LASIX AND ALDACTONE  -------LOW DOSE PREDNISONE -----is following up with at [facility] for transplant listing-----final decision after the  and psych evaluation------- otherwise feels much better  \par \par FEB 2021-----WILL INCREASE REMODULIN TO 32 NG/KG/MIN ----------   ON XARELTO,  LASIX AND ALDACTONE  -------LOW DOSE PREDNISONE -----is following up with at [facility] for transplant listing-----final decision after the  and psych evaluation------- otherwise feels much better\par \par APRIL 2021----WILL INCREASE REMODULIN TO 33 NG/KG/MIN -----AND SILDENAFILC-----   ON XARELTO,  LASIX AND ALDACTONE  -------LOW DOSE PREDNISONE -----is following up with at [facility] for transplant listing-----final decision after the  and psych evaluation------- otherwise feels much better\par \par JULY 2021----ON REMODULIN  33 NG/KG/MIN ---Remodulin site appears clean--AND SILDENAFILC-----   ON XARELTO,  LASIX AND ALDACTONE  -------LOW DOSE PREDNISONE -----is following up with at [facility] for transplant listing-----final decision after the  and psych evaluation------- otherwise feels much better---HAS has a skin lesion on the left thigh which needs to be excised but can be done under local anesthesia thanks

## 2021-07-27 ENCOUNTER — APPOINTMENT (OUTPATIENT)
Dept: DERMATOLOGY | Facility: CLINIC | Age: 41
End: 2021-07-27

## 2021-08-02 ENCOUNTER — APPOINTMENT (OUTPATIENT)
Dept: PULMONOLOGY | Facility: CLINIC | Age: 41
End: 2021-08-02
Payer: MEDICAID

## 2021-08-02 PROCEDURE — 99443: CPT

## 2021-08-02 NOTE — HISTORY OF PRESENT ILLNESS
[TextBox_4] : ------Patient is here for follow up of her pulmonary htn. ------------------INITIALLY  REFD  WITH   ECHO [DONE  OUTSIDE ] ELEVATED PASP  DILATED RV AND RA---------  HAS BEEN TOLD IN THE PAST SHE HAS  ASTHMA-----long-standing history of dyspnea on exertion-----------------has baseline tachycardia ----.....No history of fever, chills , rigors, chest pain, or hemoptysis. No h/o significant weight loss in last few months. No history of liver dysfunction , collagen vascular disorder or chronic thromboembolic disease. I would classify her dyspnea as WHO  FUNCTIONAL CLASS III-----------no calf tenderness----------SLEEP STUDY SHOWS AHI 0.6 but T 90  < 35 %-----DIAGNOSED  AS PAH ---------WAS ON ADMAPAS  NOW ON   SQ REMODULIN  [ SINCE MAY 2108]  ---S/P PACEMAKER PLACEMENT AT [facility]  AND ON METOPROLOL------\par \par ------cardiac CATH ---- 28/ july / 2014.......PAP -76 , Aortic pressure -84, left ventricle edp 10 MMHG , rt atrium - 13, PAP after NO - 42--- CO 3.19,   PVR 1928.78 ( dsc) 24.12 ( woods) PVR after NO ( 692.51( dsc), 8.66 ( enrique)\par \par CARDIAC  CATH 2108-----\par --CTA CHEST---- july 23/2014--- no evidence of PE. non specific ground glass opacities throughout the lungs....moderate pericardial effusion ---\par --repeat CT chest WITHOUT CONTRAST  12/2014 MOSAIC PATTERN UNCHANGED  FROM BEFORE\par vq scan 6/2015 abnormal- low prob PE\par cxr-6/2015 no acute disease \par ---------CT CHEST-----1. Enlarged main pulmonary artery compatible with pulmonary \par hypertension. -2. Patchy bilateral groundglass are unchanged since 06/20/2015 and are of \par uncertain origin. ------       \par \par ECHO ....July 24/ 2014----right ventricular enlargement with decrease ventricular systolic function...estimated rt ventricle systolic pre equals 113mmhg, ---\par \par ECHO 2018----  EF80, [id number]\par \par SEPT 2104  6MWT- WALKED 301 METERS-------PSG   2014   LOW  T90 BUT NO ERICA\par \par \par \par  june 2015---SAW DR FREEMAN  AT [facility]  -cath showed   elevated  pressures but  pa ANGIOgram   should only distal clts  not a candidate for  surgery for CTEPH---RA 4 mmhg, RV 46/5, PA 49/24 mean PA 33, PCWP 13   WITH SUGGESTION OF VASODILATOR  RESPONSE  [ D/W DR LUONG]------AS PER HEATH FREEMAN  SHE HAS GROUP I PAH  WITH DISTAL CLOTS S RECOMMENDED   MEDICAL TREATMENT---PT ON  PROCARDIA  AND  ADMAPAS---DID NOT TOLERATE OPSUMIT IN PAST [[-[ DUE TO LOW BP] --------- -------------------------------.\par \par ALSO ON ANTICOAGULATION-follows coumadin clinic  ------- elevated INR today DENIES BLEEDING/HEMATURIA/MELENA\par \par She reports noncompliance to adempas since 8/1/15 has  basla tachycardia f/u with DR KRISHNAMURTHY  cardilogy ---\par \par ---PFT AUG 2016------FVC 2.44, FEV 1 1.61,   TLC 4.37, mild to moderate obstructive lung defect\par ----jan 2017 6MWD 250 m---\par ---2019  6MWT    ---165 METERS\par \par --------ECHO AUG 2016-----EF 82%-----PASP 26 mmHg\par --CATH 2014 ----MEAN PA 77, LVEDP 10, CO 3.14\par \par ------CT SCAN AUG 2016-------patchy bilateral groundglass unchanged since 6/20/2015-----enlarged PA 3.5 cm-----\par CT SCAN  2018--NO PE, PA 4.0 CM,MOSAIC ATTENUATION\par \par july 2018 s/p hospitalization for syncopal episode at home. She was transferred to [facility] where  a pacemaker was inserted d/t tachycardia and syncope. She was also subsequently started on remodulin SQ by Dr Chow--current rate 12ng. She is still have dyspnea.\par \par ------OCT 2018--------s/p hospitalization at [facility]------S/P PACEMAKER PLACEMENT AT [facility]------\par \par november 2019 here for acute visit- PAH w/remodulin @16ng infusing subcutaneously\par she is c/o chronic  palpitations, slightly improved.\par comes in today for cough and wheezing- started zpack and prednisone 2 days ago\par \par \par jan 17 2020- PAH- remodulin @16ng- SQ site - has stopped draining, less painful - completed doxy last week and is now on clindamycin ---\par She is also c/o asthma exacerbation- started prednisone 10mg and using nebs- symptoms somewhat improved from a few days ago\par \par Feb 25, 2020  PAH on Xarelto and Remodulin still at 16ng SQ site. New site looks good without infection. Old site has completely healed. Patient still having intermittent jaw claudication. She has more tachycardia with periods. She has Iron deficiency anemia and has stopped taking her Iron due to constipation. she otherwise has good exercise tolerance. Has oxygen at home and is using it.\par \par JULY 2020-----S/P    LEFT BREAST BIOPSY  -------    PAH ON XARELTO   , REMODULIN 19 NG/KG/MIN ----ON OPSUMIT AND  DIURETICS --  HAS GUM PAIN / \par \par aug 2020 pulm htn on remodulin 19ng no jaw pain, no diarrhea- site clean, kevin\par no further facial swelling - broken tooth right upper gum\par She is c/o worsening DOE_ uses oxygen only at night- off diuretics\par breast mass- s/p biopsy- negative for malignancy\par \par SEPT 2020--pulm htn  REMODULIN TO 21 NG/KG/MIN, using  OXYGEN  at 2 lpm,  ON XARELTO AND DIURETICS----[facility] LUNG TRANSPLANT APPOINTMENT   SEPT 21\par Currently on clindamycin for skin infection at old remodulin site\par \par 10/2020- Remodulin now up to 23 NG/KG/MIN, using  OXYGEN  at 2 lpm,  ON XARELTO, now off diuretics. S/p [place] transplant eval pending further testing.  remodulin site is d/c/i. Otherwise respiratory symptoms are stable. c/o palpitations, weakness and dyspnea worse\par \par 11/2020 telehealth phone visit w/pt, Dr Yoon and isidro NP\par Pt is s/p hospitalization- doing well on remodulin 24ng \par \par 11 24 /2020-  telehealth phone visit w/pt, Dr Yoon and isidro NP ------  Pt is s/p hospitalization- doing well on remodulin 26 ng  ----- ON XARELTO,  LASIX AND ALDACTONE  -------LOW DOSE PREDNISONE \par \par DEC 2020----  doing well on remodulin 30  ng  ----- ON XARELTO,  LASIX AND ALDACTONE  -------LOW DOSE PREDNISONE -----is following up with at [facility] for transplant listing-----final decision after the  and psych evaluation------- otherwise feels much better  \par \par FEB 2021-----WILL INCREASE REMODULIN TO 32 NG/KG/MIN ----------   ON XARELTO,  LASIX AND ALDACTONE  -------LOW DOSE PREDNISONE -----is following up with at [facility] for transplant listing-----final decision after the  and psych evaluation------- otherwise feels much better\par \par APRIL 2021----WILL INCREASE REMODULIN TO 33 NG/KG/MIN -----AND SILDENAFILC-----   ON XARELTO,  LASIX AND ALDACTONE  -------LOW DOSE PREDNISONE -----is following up with at [facility] for transplant listing-----final decision after the  and psych evaluation------- otherwise feels much better\par \par JULY 2021----ON REMODULIN  33 NG/KG/MIN ---Remodulin site appears clean--AND SILDENAFILC-----   ON XARELTO,  LASIX AND ALDACTONE  -------LOW DOSE PREDNISONE -----is following up with at [facility] for transplant listing-----final decision after the  and psych evaluation------- otherwise feels much better---HAS has a skin lesion on the left thigh which needs to be excised but can be done under local anesthesia thanks\par AUGUST------2021--   ON REMODULIN  33 NG/KG/MIN ---Remodulin site appears clean--AND SILDENAFILC-----   ON XARELTO, alternate day LASIX  -------LOW DOSE PREDNISONE -----is following up with at [facility] for transplant listing---------- complaining of URTI

## 2021-08-02 NOTE — DISCUSSION/SUMMARY
[FreeTextEntry1] : -----Assessment and Plan--------40  year-old lady with PULMONARY ARTERIAL HYPERTENSION  S/P PPM  FOR TACHYCARDIA AT [place] ON S/Q REMODULIN - S/P LEFT BREAST BIOPSY \par   doing well on remodulin 30  ng  ----- ON XARELTO,  LASIX AND ALDACTONE  -------LOW DOSE PREDNISONE -----is following up with at [facility] for transplant listing-----final decision after the  and psych evaluation------- otherwise feels much better\par \par \par 1]   PULM  ART HTN     --------- WHO GROUP I FUNCTIONAL CLASS III ----[ AS PER  DR FREEMAN  WHO DID PULM ANGIO SHE HAS FEATURES OF GROUP I  WITH SOME EVIDENCE OF DISTAL CLOTS] She is on remodulin 32 please tell Eli ng/kg/min  -increase to 33 ng ---ON LASIX  20 THREE TIMES A WEEK   AND  ALDACTONE 25 MG  VERY DAY -also on sildeanfil \par 2) of oxygen medically necessary given severe Pulm Htn, anemia and h/o nocturnal desaturation which could worsen Pulm HTN. ADvised oxygen 2 lpm x 24hrs\par 3) course of Z-Boo-  .  \par 4) ) -She is  on xarelto---will be on lifelong anticoagulation---.\par 5) , -asthma -use nebs \par 6)   --S/P PPM- follows EP clinic at [facility] -\par 7) chronic palpitations- -- NEEDS TO SEE   Dr AMBAR HILTON - heart failure clinic\par 8)  she has features of ERICA- including sleep disturbance, nocturnal desat, and excessive fatigue-  HST was negative for ERICA in 2014 but had desaturation. Will try to repeat HST.\par 9)  has consult /[facility] -----\par 10) Anemia: -------  . Will monitor for need for transfusions/iron\par 11) breast mass- biopsy negative for malignancy- yearLy mammo advised\par 12)up to date with  influenza vac\par 13)  LEFT THIGH SKIN LESION ----needs to see Dr. Reynolds\par 14]  f/u in 1 weeks\par june/ july  3 WEEK\par \par \par \par \par   Patient at this time  will follow  the above mentioned recommendations --and f/u in 1 month---If you have any questions  I can be reached  at #  [phone]. \par \par Vivian Yoon MD, FCCP \par Director, Pulmonary Hypertension Program \par [facility] [place] \par Division of Pulmonary, Critical Care and Sleep Medicine \par  Professor of Medicine \par [employer]\par \par

## 2021-08-03 ENCOUNTER — APPOINTMENT (OUTPATIENT)
Dept: PULMONOLOGY | Facility: CLINIC | Age: 41
End: 2021-08-03
Payer: MEDICAID

## 2021-08-03 ENCOUNTER — NON-APPOINTMENT (OUTPATIENT)
Age: 41
End: 2021-08-03

## 2021-08-03 VITALS
BODY MASS INDEX: 35.63 KG/M2 | WEIGHT: 227 LBS | SYSTOLIC BLOOD PRESSURE: 118 MMHG | DIASTOLIC BLOOD PRESSURE: 83 MMHG | HEART RATE: 96 BPM | TEMPERATURE: 97.3 F | HEIGHT: 67 IN | OXYGEN SATURATION: 97 %

## 2021-08-03 PROCEDURE — 99215 OFFICE O/P EST HI 40 MIN: CPT | Mod: 25

## 2021-08-03 NOTE — DISCUSSION/SUMMARY
[FreeTextEntry1] : -----Assessment and Plan--------40  year-old lady with PULMONARY ARTERIAL HYPERTENSION  S/P PPM  FOR TACHYCARDIA AT [place] ON S/Q REMODULIN - S/P LEFT BREAST BIOPSY \par   doing well on remodulin 30  ng  ----- ON XARELTO,  LASIX AND ALDACTONE  -------LOW DOSE PREDNISONE -----is following up with at [facility] for transplant listing-----final decision after the  and psych evaluation------- otherwise feels much better\par \par \par 1]   PULM  ART HTN     --------- WHO GROUP I FUNCTIONAL CLASS III ----[ AS PER  DR FREEMAN  WHO DID PULM ANGIO SHE HAS FEATURES OF GROUP I  WITH SOME EVIDENCE OF DISTAL CLOTS] She is on remodulin 34 - will increase to 36ng --ON LASIX  20 5  TIMES A WEEK - will increase to daily dosing  AND  ALDACTONE 25 MG  VERY DAY -also on sildenafil \par 2) of oxygen medically necessary given severe Pulm Htn, anemia and h/o nocturnal desaturation which could worsen Pulm HTN. ADvised oxygen 2 lpm x 24hrs\par 3) course of Z-Boo-  .and prednisone  \par 4) ) -She is  on xarelto---will be on lifelong anticoagulation---.\par 5) , -asthma -use nebs \par 6)   --S/P PPM- follows EP clinic at [place] -\par 7) chronic palpitations- -- NEEDS TO SEE   Dr AMBAR HILTON - heart failure clinic\par 8)  she has features of ERICA- including sleep disturbance, nocturnal desat, and excessive fatigue-  HST was negative for ERICA in 2014 but had desaturation. Will try to repeat HST.\par 9)  has consult /[facility] [facility] Transplant Team -----\par 10) Anemia: -------  . Will monitor for need for transfusions/iron\par 11) breast mass- biopsy negative for malignancy- yearLy mammo advised\par 12) f/u next week\par 13) labs drawn in our pffice today\par \par \par \par \par   Patient at this time  will follow  the above mentioned recommendations --and f/u in 1 month---If you have any questions  I can be reached  at #  [phone]. \par \par Vivian Yoon MD, FCCP \par Director, Pulmonary Hypertension Program \par [facility] \par Division of Pulmonary, Critical Care and Sleep Medicine \par  Professor of Medicine \par [employer]\par \par

## 2021-08-03 NOTE — HISTORY OF PRESENT ILLNESS
[TextBox_4] : ------Patient is here for follow up of her pulmonary htn. ------------------INITIALLY  REFD  WITH   ECHO [DONE  OUTSIDE ] ELEVATED PASP  DILATED RV AND RA---------  HAS BEEN TOLD IN THE PAST SHE HAS  ASTHMA-----long-standing history of dyspnea on exertion-----------------has baseline tachycardia ----.....No history of fever, chills , rigors, chest pain, or hemoptysis. No h/o significant weight loss in last few months. No history of liver dysfunction , collagen vascular disorder or chronic thromboembolic disease. I would classify her dyspnea as WHO  FUNCTIONAL CLASS III-----------no calf tenderness----------SLEEP STUDY SHOWS AHI 0.6 but T 90  < 35 %-----DIAGNOSED  AS PAH ---------WAS ON ADMAPAS  NOW ON   SQ REMODULIN  [ SINCE MAY 2108]  ---S/P PACEMAKER PLACEMENT AT [facility]  AND ON METOPROLOL------\par \par ------cardiac CATH ---- 28/ july / 2014.......PAP -76 , Aortic pressure -84, left ventricle edp 10 MMHG , rt atrium - 13, PAP after NO - 42--- CO 3.19,   PVR 1928.78 ( dsc) 24.12 ( woods) PVR after NO ( 692.51( dsc), 8.66 ( enrique)\par \par CARDIAC  CATH 2108-----\par --CTA CHEST---- july 23/2014--- no evidence of PE. non specific ground glass opacities throughout the lungs....moderate pericardial effusion ---\par --repeat CT chest WITHOUT CONTRAST  12/2014 MOSAIC PATTERN UNCHANGED  FROM BEFORE\par vq scan 6/2015 abnormal- low prob PE\par cxr-6/2015 no acute disease \par ---------CT CHEST-----1. Enlarged main pulmonary artery compatible with pulmonary \par hypertension. -2. Patchy bilateral groundglass are unchanged since 06/20/2015 and are of \par uncertain origin. ------       \par \par ECHO ....July 24/ 2014----right ventricular enlargement with decrease ventricular systolic function...estimated rt ventricle systolic pre equals 113mmhg, ---\par \par ECHO 2018----  EF80, [id number]\par \par SEPT 2104  6MWT- WALKED 301 METERS-------PSG   2014   LOW  T90 BUT NO ERICA\par \par \par \par  june 2015---SAW DR FREEMAN  AT [facility]  -cath showed   elevated  pressures but  pa ANGIOgram   should only distal clts  not a candidate for  surgery for CTEPH---RA 4 mmhg, RV 46/5, PA 49/24 mean PA 33, PCWP 13   WITH SUGGESTION OF VASODILATOR  RESPONSE  [ D/W DR LUONG]------AS PER HEATH FREEMAN  SHE HAS GROUP I PAH  WITH DISTAL CLOTS S RECOMMENDED   MEDICAL TREATMENT---PT ON  PROCARDIA  AND  ADMAPAS---DID NOT TOLERATE OPSUMIT IN PAST [[-[ DUE TO LOW BP] --------- -------------------------------.\par \par ALSO ON ANTICOAGULATION-follows coumadin clinic  ------- elevated INR today DENIES BLEEDING/HEMATURIA/MELENA\par \par She reports noncompliance to adempas since 8/1/15 has  basla tachycardia f/u with DR KRSIHNAMURTHY  cardilogy ---\par \par ---PFT AUG 2016------FVC 2.44, FEV 1 1.61,   TLC 4.37, mild to moderate obstructive lung defect\par ----jan 2017 6MWD 250 m---\par ---2019  6MWT    ---165 METERS\par \par --------ECHO AUG 2016-----EF 82%-----PASP 26 mmHg\par --CATH 2014 ----MEAN PA 77, LVEDP 10, CO 3.14\par \par ------CT SCAN AUG 2016-------patchy bilateral groundglass unchanged since 6/20/2015-----enlarged PA 3.5 cm-----\par CT SCAN  2018--NO PE, PA 4.0 CM,MOSAIC ATTENUATION\par \par july 2018 s/p hospitalization for syncopal episode at home. She was transferred to [facility] where  a pacemaker was inserted d/t tachycardia and syncope. She was also subsequently started on remodulin SQ by Dr Chow--current rate 12ng. She is still have dyspnea.\par \par ------OCT 2018--------s/p hospitalization at [facility]------S/P PACEMAKER PLACEMENT AT [facility]------\par \par november 2019 here for acute visit- PAH w/remodulin @16ng infusing subcutaneously\par she is c/o chronic  palpitations, slightly improved.\par comes in today for cough and wheezing- started zpack and prednisone 2 days ago\par \par \par jan 17 2020- PAH- remodulin @16ng- SQ site - has stopped draining, less painful - completed doxy last week and is now on clindamycin ---\par She is also c/o asthma exacerbation- started prednisone 10mg and using nebs- symptoms somewhat improved from a few days ago\par \par Feb 25, 2020  PAH on Xarelto and Remodulin still at 16ng SQ site. New site looks good without infection. Old site has completely healed. Patient still having intermittent jaw claudication. She has more tachycardia with periods. She has Iron deficiency anemia and has stopped taking her Iron due to constipation. she otherwise has good exercise tolerance. Has oxygen at home and is using it.\par \par JULY 2020-----S/P    LEFT BREAST BIOPSY  -------    PAH ON XARELTO   , REMODULIN 19 NG/KG/MIN ----ON OPSUMIT AND  DIURETICS --  HAS GUM PAIN / \par \par aug 2020 pulm htn on remodulin 19ng no jaw pain, no diarrhea- site clean, kevin\par no further facial swelling - broken tooth right upper gum\par She is c/o worsening DOE_ uses oxygen only at night- off diuretics\par breast mass- s/p biopsy- negative for malignancy\par \par SEPT 2020--pulm htn  REMODULIN TO 21 NG/KG/MIN, using  OXYGEN  at 2 lpm,  ON XARELTO AND DIURETICS----[facility] LUNG TRANSPLANT APPOINTMENT   SEPT 21\par Currently on clindamycin for skin infection at old remodulin site\par \par 10/2020- Remodulin now up to 23 NG/KG/MIN, using  OXYGEN  at 2 lpm,  ON XARELTO, now off diuretics. S/p [place] transplant eval pending further testing.  remodulin site is d/c/i. Otherwise respiratory symptoms are stable. c/o palpitations, weakness and dyspnea worse\par \par 11/2020 telehealth phone visit w/pt, Dr Yoon and isidro NP\par Pt is s/p hospitalization- doing well on remodulin 24ng \par \par 11 24 /2020-  telehealth phone visit w/pt, Dr Yoon and isidro NP ------  Pt is s/p hospitalization- doing well on remodulin 26 ng  ----- ON XARELTO,  LASIX AND ALDACTONE  -------LOW DOSE PREDNISONE \par \par DEC 2020----  doing well on remodulin 30  ng  ----- ON XARELTO,  LASIX AND ALDACTONE  -------LOW DOSE PREDNISONE -----is following up with at [facility] for transplant listing-----final decision after the  and psych evaluation------- otherwise feels much better  \par \par FEB 2021-----WILL INCREASE REMODULIN TO 32 NG/KG/MIN ----------   ON XARELTO,  LASIX AND ALDACTONE  -------LOW DOSE PREDNISONE -----is following up with at [facility] for transplant listing-----final decision after the  and psych evaluation------- otherwise feels much better\par \par APRIL 2021----WILL INCREASE REMODULIN TO 33 NG/KG/MIN -----AND SILDENAFILC-----   ON XARELTO,  LASIX AND ALDACTONE  -------LOW DOSE PREDNISONE -----is following up with at [facility] for transplant listing-----final decision after the  and psych evaluation------- otherwise feels much better\par \par JULY 2021----ON REMODULIN  33 NG/KG/MIN ---Remodulin site appears clean--AND SILDENAFILC-----   ON XARELTO,  LASIX AND ALDACTONE  -------LOW DOSE PREDNISONE -----is following up with at [facility] for transplant listing-----final decision after the  and psych evaluation------- otherwise feels much better---HAS has a skin lesion on the left thigh which needs to be excised but can be done under local anesthesia thanks\par AUGUST------2021--   ON REMODULIN  33 NG/KG/MIN ---Remodulin site appears clean--AND SILDENAFILC-----   ON XARELTO, alternate day LASIX  -------LOW DOSE PREDNISONE -----is following up with at [facility] for transplant listing---------- complaining of URTI\par \par 8/2021 c/o dyspnea. On remodulin 34ng, starting prednisone 10mg and zpack yesterday. On oxygen 2 lpm. Taking diuretics every day----feels tachycardia on exertion\par

## 2021-08-09 ENCOUNTER — NON-APPOINTMENT (OUTPATIENT)
Age: 41
End: 2021-08-09

## 2021-08-16 ENCOUNTER — APPOINTMENT (OUTPATIENT)
Dept: PULMONOLOGY | Facility: CLINIC | Age: 41
End: 2021-08-16

## 2021-08-16 ENCOUNTER — APPOINTMENT (OUTPATIENT)
Dept: PULMONOLOGY | Facility: CLINIC | Age: 41
End: 2021-08-16
Payer: MEDICAID

## 2021-08-16 PROCEDURE — 99214 OFFICE O/P EST MOD 30 MIN: CPT | Mod: 95

## 2021-08-16 NOTE — HISTORY OF PRESENT ILLNESS
[Home] : at home, [unfilled] , at the time of the visit. [Medical Office: ([facility])___] : at the medical office located in  [Verbal consent obtained from patient] : the patient, [unfilled] [TextBox_4] : ------Patient is here for follow up of her pulmonary htn. ------------------INITIALLY  REFD  WITH   ECHO [DONE  OUTSIDE ] ELEVATED PASP  DILATED RV AND RA---------  HAS BEEN TOLD IN THE PAST SHE HAS  ASTHMA-----long-standing history of dyspnea on exertion-----------------has baseline tachycardia ----.....No history of fever, chills , rigors, chest pain, or hemoptysis. No h/o significant weight loss in last few months. No history of liver dysfunction , collagen vascular disorder or chronic thromboembolic disease. I would classify her dyspnea as WHO  FUNCTIONAL CLASS III-----------no calf tenderness----------SLEEP STUDY SHOWS AHI 0.6 but T 90  < 35 %-----DIAGNOSED  AS PAH ---------WAS ON ADMAPAS  NOW ON   SQ REMODULIN  [ SINCE MAY 2108]  ---S/P PACEMAKER PLACEMENT AT [facility]  AND ON METOPROLOL------\par \par ------cardiac CATH ---- 28/ july / 2014.......PAP -76 , Aortic pressure -84, left ventricle edp 10 MMHG , rt atrium - 13, PAP after NO - 42--- CO 3.19,   PVR 1928.78 ( dsc) 24.12 ( woods) PVR after NO ( 692.51( dsc), 8.66 ( enrique)\par \par CARDIAC  CATH 2108-----\par --CTA CHEST---- july 23/2014--- no evidence of PE. non specific ground glass opacities throughout the lungs....moderate pericardial effusion ---\par --repeat CT chest WITHOUT CONTRAST  12/2014 MOSAIC PATTERN UNCHANGED  FROM BEFORE\par vq scan 6/2015 abnormal- low prob PE\par cxr-6/2015 no acute disease \par ---------CT CHEST-----1. Enlarged main pulmonary artery compatible with pulmonary \par hypertension. -2. Patchy bilateral groundglass are unchanged since 06/20/2015 and are of \par uncertain origin. ------       \par \par ECHO ....July 24/ 2014----right ventricular enlargement with decrease ventricular systolic function...estimated rt ventricle systolic pre equals 113mmhg, ---\par \par ECHO 2018----  EF80, [id number]\par \par SEPT 2104  6MWT- WALKED 301 METERS-------PSG   2014   LOW  T90 BUT NO ERICA\par \par \par \par  june 2015---SAW DR FREEMAN  AT [facility]  -cath showed   elevated  pressures but  pa ANGIOgram   should only distal clts  not a candidate for  surgery for CTEPH---RA 4 mmhg, RV 46/5, PA 49/24 mean PA 33, PCWP 13   WITH SUGGESTION OF VASODILATOR  RESPONSE  [ D/W DR LUONG]------AS PER HEATH FREEMAN  SHE HAS GROUP I PAH  WITH DISTAL CLOTS S RECOMMENDED   MEDICAL TREATMENT---PT ON  PROCARDIA  AND  ADMAPAS---DID NOT TOLERATE OPSUMIT IN PAST [[-[ DUE TO LOW BP] --------- -------------------------------.\par \par ALSO ON ANTICOAGULATION-follows coumadin clinic  ------- elevated INR today DENIES BLEEDING/HEMATURIA/MELENA\par \par She reports noncompliance to adempas since 8/1/15 has  basla tachycardia f/u with DR KRISHNAMURTHY  cardilogy ---\par \par ---PFT AUG 2016------FVC 2.44, FEV 1 1.61,   TLC 4.37, mild to moderate obstructive lung defect\par ----jan 2017 6MWD 250 m---\par ---2019  6MWT    ---165 METERS\par \par --------ECHO AUG 2016-----EF 82%-----PASP 26 mmHg\par --CATH 2014 ----MEAN PA 77, LVEDP 10, CO 3.14\par \par ------CT SCAN AUG 2016-------patchy bilateral groundglass unchanged since 6/20/2015-----enlarged PA 3.5 cm-----\par CT SCAN  2018--NO PE, PA 4.0 CM,MOSAIC ATTENUATION\par \par july 2018 s/p hospitalization for syncopal episode at home. She was transferred to [facility] where  a pacemaker was inserted d/t tachycardia and syncope. She was also subsequently started on remodulin SQ by Dr Chow--current rate 12ng. She is still have dyspnea.\par \par ------OCT 2018--------s/p hospitalization at [facility]------S/P PACEMAKER PLACEMENT AT [place]------\par \par november 2019 here for acute visit- PAH w/remodulin @16ng infusing subcutaneously\par she is c/o chronic  palpitations, slightly improved.\par comes in today for cough and wheezing- started zpack and prednisone 2 days ago\par \par \par jan 17 2020- PAH- remodulin @16ng- SQ site - has stopped draining, less painful - completed doxy last week and is now on clindamycin ---\par She is also c/o asthma exacerbation- started prednisone 10mg and using nebs- symptoms somewhat improved from a few days ago\par \par Feb 25, 2020  PAH on Xarelto and Remodulin still at 16ng SQ site. New site looks good without infection. Old site has completely healed. Patient still having intermittent jaw claudication. She has more tachycardia with periods. She has Iron deficiency anemia and has stopped taking her Iron due to constipation. she otherwise has good exercise tolerance. Has oxygen at home and is using it.\par \par JULY 2020-----S/P    LEFT BREAST BIOPSY  -------    PAH ON XARELTO   , REMODULIN 19 NG/KG/MIN ----ON OPSUMIT AND  DIURETICS --  HAS GUM PAIN / \par \par aug 2020 pulm htn on remodulin 19ng no jaw pain, no diarrhea- site clean, kevin\par no further facial swelling - broken tooth right upper gum\par She is c/o worsening DOE_ uses oxygen only at night- off diuretics\par breast mass- s/p biopsy- negative for malignancy\par \par SEPT 2020--pulm htn  REMODULIN TO 21 NG/KG/MIN, using  OXYGEN  at 2 lpm,  ON XARELTO AND DIURETICS----[facility] LUNG TRANSPLANT APPOINTMENT   SEPT 21\par Currently on clindamycin for skin infection at old remodulin site\par \par 10/2020- Remodulin now up to 23 NG/KG/MIN, using  OXYGEN  at 2 lpm,  ON XARELTO, now off diuretics. S/p [facility] transplant eval pending further testing.  remodulin site is d/c/i. Otherwise respiratory symptoms are stable. c/o palpitations, weakness and dyspnea worse\par \par 11/2020 telehealth phone visit w/pt, Dr Yoon and isidro NP\par Pt is s/p hospitalization- doing well on remodulin 24ng \par \par 11 24 /2020-  telehealth phone visit w/pt, Dr Yoon and isidro NP ------  Pt is s/p hospitalization- doing well on remodulin 26 ng  ----- ON XARELTO,  LASIX AND ALDACTONE  -------LOW DOSE PREDNISONE \par \par DEC 2020----  doing well on remodulin 30  ng  ----- ON XARELTO,  LASIX AND ALDACTONE  -------LOW DOSE PREDNISONE -----is following up with at [facility] for transplant listing-----final decision after the  and psych evaluation------- otherwise feels much better  \par \par FEB 2021-----WILL INCREASE REMODULIN TO 32 NG/KG/MIN ----------   ON XARELTO,  LASIX AND ALDACTONE  -------LOW DOSE PREDNISONE -----is following up with at [facility] for transplant listing-----final decision after the  and psych evaluation------- otherwise feels much better\par \par APRIL 2021----WILL INCREASE REMODULIN TO 33 NG/KG/MIN -----AND SILDENAFILC-----   ON XARELTO,  LASIX AND ALDACTONE  -------LOW DOSE PREDNISONE -----is following up with at [facility] for transplant listing-----final decision after the  and psych evaluation------- otherwise feels much better\par \par JULY 2021----ON REMODULIN  33 NG/KG/MIN ---Remodulin site appears clean--AND SILDENAFILC-----   ON XARELTO,  LASIX AND ALDACTONE  -------LOW DOSE PREDNISONE -----is following up with at [facility] for transplant listing-----final decision after the  and psych evaluation------- otherwise feels much better---HAS has a skin lesion on the left thigh which needs to be excised but can be done under local anesthesia thanks\par AUGUST------2021--   ON REMODULIN  33 NG/KG/MIN ---Remodulin site appears clean--AND SILDENAFILC-----   ON XARELTO, alternate day LASIX  -------LOW DOSE PREDNISONE -----is following up with at [facility] for transplant listing---------- complaining of URTI\par \par 8/2021 c/o dyspnea. On remodulin 34ng, starting prednisone 10mg and zpack yesterday. On oxygen 2 lpm. Taking diuretics every day----feels tachycardia on exertion\par \par 8/16/2021 pulm htn - doing well  on remodulin 36ng- having jaw pain\par

## 2021-08-16 NOTE — DISCUSSION/SUMMARY
[FreeTextEntry1] : -----Assessment and Plan--------41  year-old lady with PULMONARY ARTERIAL HYPERTENSION  S/P PPM  FOR TACHYCARDIA AT [place] ON S/Q REMODULIN - S/P LEFT BREAST BIOPSY \par   doing well on remodulin 30  ng  ----- ON XARELTO,  LASIX AND ALDACTONE  -------LOW DOSE PREDNISONE -----is following up with at [facility] for transplant listing-----final decision after the  and psych evaluation------- otherwise feels much better\par \par \par 1]   PULM  ART HTN     --------- WHO GROUP I FUNCTIONAL CLASS III ----[ AS PER  DR FREEMAN  WHO DID PULM ANGIO SHE HAS FEATURES OF GROUP I  WITH SOME EVIDENCE OF DISTAL CLOTS] She is on remodulin 36ng --ON LASIX  20 5  TIMES A WEEK - will increase to daily dosing  AND  ALDACTONE 25 MG  VERY DAY -also on sildenafil \par 2) of oxygen medically necessary given severe Pulm Htn, anemia and h/o nocturnal desaturation which could worsen Pulm HTN. ADvised oxygen 2 lpm x 24hrs\par 3) -She is  on xarelto---will be on lifelong anticoagulation---.\par 4)-asthma -use nebs \par 5)   --S/P PPM- follows EP clinic at [facility] -\par 6) chronic palpitations- -- NEEDS TO SEE   Dr AMBAR HILTON - heart failure clinic\par 7)  she has features of ERICA- including sleep disturbance, nocturnal desat, and excessive fatigue-  HST was negative for ERICA in 2014 but had desaturation. Will try to repeat HST.\par 8)  has consult /[facility] Transplant Team -----\par 9) Anemia: -------  . Will monitor for need for transfusions/iron\par 10) f/u in one month\par \par \par \par \par   Patient at this time  will follow  the above mentioned recommendations --and f/u in 1 month---If you have any questions  I can be reached  at #  [phone]. \par \par Vivian Yoon MD, FCCP \par Director, Pulmonary Hypertension Program \par [facility] \par Division of Pulmonary, Critical Care and Sleep Medicine \par  Professor of Medicine \par [facility] [employer] [facility]\par \par \par CLINTON Harrell\par \par

## 2021-08-23 NOTE — ED ADULT TRIAGE NOTE - WEIGHT IN LBS
What Type Of Note Output Would You Prefer (Optional)?: Standard Output
How Severe Are Your Spot(S)?: mild
Have Your Spot(S) Been Treated In The Past?: has not been treated
Hpi Title: Evaluation of Skin Lesions
199.9

## 2021-09-23 ENCOUNTER — NON-APPOINTMENT (OUTPATIENT)
Age: 41
End: 2021-09-23

## 2021-09-27 ENCOUNTER — NON-APPOINTMENT (OUTPATIENT)
Age: 41
End: 2021-09-27

## 2021-10-07 ENCOUNTER — APPOINTMENT (OUTPATIENT)
Dept: PULMONOLOGY | Facility: CLINIC | Age: 41
End: 2021-10-07
Payer: MEDICAID

## 2021-10-07 ENCOUNTER — MED ADMIN CHARGE (OUTPATIENT)
Age: 41
End: 2021-10-07

## 2021-10-07 ENCOUNTER — LABORATORY RESULT (OUTPATIENT)
Age: 41
End: 2021-10-07

## 2021-10-07 VITALS
TEMPERATURE: 97.4 F | HEART RATE: 113 BPM | BODY MASS INDEX: 35.63 KG/M2 | SYSTOLIC BLOOD PRESSURE: 114 MMHG | HEIGHT: 67 IN | WEIGHT: 227 LBS | OXYGEN SATURATION: 87 % | DIASTOLIC BLOOD PRESSURE: 85 MMHG

## 2021-10-07 PROCEDURE — 96372 THER/PROPH/DIAG INJ SC/IM: CPT

## 2021-10-07 PROCEDURE — 99215 OFFICE O/P EST HI 40 MIN: CPT | Mod: 25

## 2021-10-07 RX ORDER — CLINDAMYCIN HYDROCHLORIDE 150 MG/1
150 CAPSULE ORAL EVERY 6 HOURS
Qty: 30 | Refills: 1 | Status: DISCONTINUED | COMMUNITY
Start: 2020-08-10 | End: 2021-10-07

## 2021-10-07 RX ORDER — AZITHROMYCIN 250 MG/1
250 TABLET, FILM COATED ORAL
Qty: 1 | Refills: 0 | Status: DISCONTINUED | COMMUNITY
Start: 2021-06-11 | End: 2021-10-07

## 2021-10-07 RX ORDER — RIVAROXABAN 10 MG/1
10 TABLET, FILM COATED ORAL
Qty: 30 | Refills: 2 | Status: DISCONTINUED | COMMUNITY
Start: 2020-12-28 | End: 2021-10-07

## 2021-10-07 RX ORDER — METHYLPREDNISOLONE 40 MG/ML
40 INJECTION, POWDER, LYOPHILIZED, FOR SOLUTION INTRAMUSCULAR; INTRAVENOUS
Qty: 1 | Refills: 0 | Status: COMPLETED | OUTPATIENT
Start: 2021-10-07

## 2021-10-07 RX ADMIN — METHYLPREDNISOLONE SODIUM SUCCINATE 0.5 MG: 40 INJECTION, POWDER, FOR SOLUTION INTRAMUSCULAR; INTRAVENOUS at 00:00

## 2021-10-07 NOTE — DISCUSSION/SUMMARY
[FreeTextEntry1] : -----Assessment and Plan--------41  year-old lady with PULMONARY ARTERIAL HYPERTENSION  S/P PPM  FOR TACHYCARDIA AT [place] ON REMODULIN - S/P LEFT BREAST BIOPSY \par   doing well on remodulin 36  ng  ----- ON XARELTO,  LASIX AND ALDACTONE  -------LOW DOSE PREDNISONE -----is following up with at [facility] for transplant listing-----final decision after the  and psych evaluation\par \par \par 1]   PULM  ART HTN     --------- WHO GROUP I FUNCTIONAL CLASS III ----[ AS PER  DR FREEMAN  WHO DID PULM ANGIO SHE HAS FEATURES OF GROUP I  WITH SOME EVIDENCE OF DISTAL CLOTS] She is on remodulin 36ng -- sildenafile 10 mg TID, ON LASIX  20 mg 3  TIMES A WEEK  AND  ALDACTONE 25 MG  VERY DAY \par 2) Asthma - gave methylprednisolone IM for wheezing with clinical improvement. continue prednisone 10 mg rather than her previous dose of 7.5 mg. on ipratropium, added nebulized levalbuterol and budesonide. will start  DUPIXENT  as has been steroid dependent likely contributing to her weight, which is precluding lung transplant. \par 3) oxygen medically necessary given severe Pulm Htn, anemia and h/o nocturnal desaturation which could worsen Pulm HTN. ADvised oxygen 2 lpm x 24hrs\par 4) -She is  on xarelto---will be on lifelong anticoagulation---.\par 5)   --S/P PPM- follows EP clinic at [place] -\par 6) chronic palpitations- -- NEEDS TO SEE   Dr AMBAR HILTON - heart failure clinic\par 7)  she has features of ERICA- including sleep disturbance, nocturnal desat, and excessive fatigue-  HST was negative for ERICA in 2014 but had desaturation. Will try to repeat HST at next visit\par 8)  has consult /[facility] Transplant Team -----\par 9) Anemia: -------  . Will monitor for need for transfusions/iron\par 10) f/u in one month\par 11) labs today\par \par \par \par \par   Patient at this time  will follow  the above mentioned recommendations --and f/u in 1 month---If you have any questions  I can be reached  at #  [phone]. \par \par Vivian Yoon MD, FCCP \par Director, Pulmonary Hypertension Program \par [facility] \par Division of Pulmonary, Critical Care and Sleep Medicine \par  Professor of Medicine \par [employer]\par \par \par CLINTON Harrell\par \par

## 2021-10-07 NOTE — PHYSICAL EXAM
[No Acute Distress] : no acute distress [Well Nourished] : well nourished [Well Groomed] : well groomed [Normal Oropharynx] : normal oropharynx [III] : Mallampati Class: III [Normal Appearance] : normal appearance [No JVD] : no jvd [Normal Pulses] : normal pulses [Normal S1, S2] : normal s1, s2 [No Murmurs] : no murmurs [No Resp Distress] : no resp distress [No Acc Muscle Use] : no acc muscle use [No Abnormalities] : no abnormalities [Benign] : benign [Soft] : soft [Normal Gait] : normal gait [No Clubbing] : no clubbing [No Cyanosis] : no cyanosis [No Edema] : no edema [Normal Color/ Pigmentation] : normal color/ pigmentation [No Focal Deficits] : no focal deficits [Oriented x3] : oriented x3 [Normal Mood] : normal mood [Normal Affect] : normal affect [TextBox_54] : tachycardia [TextBox_68] : mild inspiratory and expiratory wheezes bilaterally

## 2021-10-07 NOTE — HISTORY OF PRESENT ILLNESS
[Home] : at home, [unfilled] , at the time of the visit. [Medical Office: ([place])___] : at the medical office located in  [Verbal consent obtained from patient] : the patient, [unfilled] [TextBox_4] : ------Patient is here for follow up of her pulmonary htn. ------------------INITIALLY  REFD  WITH   ECHO [DONE  OUTSIDE ] ELEVATED PASP  DILATED RV AND RA---------  HAS BEEN TOLD IN THE PAST SHE HAS  ASTHMA-----long-standing history of dyspnea on exertion-----------------has baseline tachycardia ----.....No history of fever, chills , rigors, chest pain, or hemoptysis. No h/o significant weight loss in last few months. No history of liver dysfunction , collagen vascular disorder or chronic thromboembolic disease. I would classify her dyspnea as WHO  FUNCTIONAL CLASS III-----------no calf tenderness----------SLEEP STUDY SHOWS AHI 0.6 but T 90  < 35 %-----DIAGNOSED  AS PAH ---------WAS ON ADMAPAS  NOW ON   SQ REMODULIN  [ SINCE MAY 2108]  ---S/P PACEMAKER PLACEMENT AT [facility]  AND ON METOPROLOL------\par \par ------cardiac CATH ---- 28/ july / 2014.......PAP -76 , Aortic pressure -84, left ventricle edp 10 MMHG , rt atrium - 13, PAP after NO - 42--- CO 3.19,   PVR 1928.78 ( dsc) 24.12 ( woods) PVR after NO ( 692.51( dsc), 8.66 ( enrique)\par \par CARDIAC  CATH 2108-----\par --CTA CHEST---- july 23/2014--- no evidence of PE. non specific ground glass opacities throughout the lungs....moderate pericardial effusion ---\par --repeat CT chest WITHOUT CONTRAST  12/2014 MOSAIC PATTERN UNCHANGED  FROM BEFORE\par vq scan 6/2015 abnormal- low prob PE\par cxr-6/2015 no acute disease \par ---------CT CHEST-----1. Enlarged main pulmonary artery compatible with pulmonary \par hypertension. -2. Patchy bilateral groundglass are unchanged since 06/20/2015 and are of \par uncertain origin. ------       \par \par ECHO ....July 24/ 2014----right ventricular enlargement with decrease ventricular systolic function...estimated rt ventricle systolic pre equals 113mmhg, ---\par \par ECHO 2018----  EF80, [id number]\par \par SEPT 2104  6MWT- WALKED 301 METERS-------PSG   2014   LOW  T90 BUT NO ERICA\par \par \par \par  june 2015---SAW DR FREEMAN  AT [facility]  -cath showed   elevated  pressures but  pa ANGIOgram   should only distal clts  not a candidate for  surgery for CTEPH---RA 4 mmhg, RV 46/5, PA 49/24 mean PA 33, PCWP 13   WITH SUGGESTION OF VASODILATOR  RESPONSE  [ D/W DR LUONG]------AS PER HETAH FREEMAN  SHE HAS GROUP I PAH  WITH DISTAL CLOTS S RECOMMENDED   MEDICAL TREATMENT---PT ON  PROCARDIA  AND  ADMAPAS---DID NOT TOLERATE OPSUMIT IN PAST [[-[ DUE TO LOW BP] --------- -------------------------------.\par \par ALSO ON ANTICOAGULATION-follows coumadin clinic  ------- elevated INR today DENIES BLEEDING/HEMATURIA/MELENA\par \par She reports noncompliance to adempas since 8/1/15 has  basla tachycardia f/u with DR KRISHNAMURTHY  cardilogy ---\par \par ---PFT AUG 2016------FVC 2.44, FEV 1 1.61,   TLC 4.37, mild to moderate obstructive lung defect\par ----jan 2017 6MWD 250 m---\par ---2019  6MWT    ---165 METERS\par \par --------ECHO AUG 2016-----EF 82%-----PASP 26 mmHg\par --CATH 2014 ----MEAN PA 77, LVEDP 10, CO 3.14\par \par ------CT SCAN AUG 2016-------patchy bilateral groundglass unchanged since 6/20/2015-----enlarged PA 3.5 cm-----\par CT SCAN  2018--NO PE, PA 4.0 CM,MOSAIC ATTENUATION\par \par july 2018 s/p hospitalization for syncopal episode at home. She was transferred to [facility] where  a pacemaker was inserted d/t tachycardia and syncope. She was also subsequently started on remodulin SQ by Dr Chow--current rate 12ng. She is still have dyspnea.\par \par ------OCT 2018--------s/p hospitalization at [facility]------S/P PACEMAKER PLACEMENT AT [facility]------\par \par november 2019 here for acute visit- PAH w/remodulin @16ng infusing subcutaneously\par she is c/o chronic  palpitations, slightly improved.\par comes in today for cough and wheezing- started zpack and prednisone 2 days ago\par \par \par jan 17 2020- PAH- remodulin @16ng- SQ site - has stopped draining, less painful - completed doxy last week and is now on clindamycin ---\par She is also c/o asthma exacerbation- started prednisone 10mg and using nebs- symptoms somewhat improved from a few days ago\par \par Feb 25, 2020  PAH on Xarelto and Remodulin still at 16ng SQ site. New site looks good without infection. Old site has completely healed. Patient still having intermittent jaw claudication. She has more tachycardia with periods. She has Iron deficiency anemia and has stopped taking her Iron due to constipation. she otherwise has good exercise tolerance. Has oxygen at home and is using it.\par \par JULY 2020-----S/P    LEFT BREAST BIOPSY  -------    PAH ON XARELTO   , REMODULIN 19 NG/KG/MIN ----ON OPSUMIT AND  DIURETICS --  HAS GUM PAIN / \par \par aug 2020 pulm htn on remodulin 19ng no jaw pain, no diarrhea- site clean, kevin\par no further facial swelling - broken tooth right upper gum\par She is c/o worsening DOE_ uses oxygen only at night- off diuretics\par breast mass- s/p biopsy- negative for malignancy\par \par SEPT 2020--pulm htn  REMODULIN TO 21 NG/KG/MIN, using  OXYGEN  at 2 lpm,  ON XARELTO AND DIURETICS----[facility] LUNG TRANSPLANT APPOINTMENT   SEPT 21\par Currently on clindamycin for skin infection at old remodulin site\par \par 10/2020- Remodulin now up to 23 NG/KG/MIN, using  OXYGEN  at 2 lpm,  ON XARELTO, now off diuretics. S/p [facility] transplant eval pending further testing.  remodulin site is d/c/i. Otherwise respiratory symptoms are stable. c/o palpitations, weakness and dyspnea worse\par \par 11/2020 telehealth phone visit w/pt, Dr Yoon and isidro NP\par Pt is s/p hospitalization- doing well on remodulin 24ng \par \par 11 24 /2020-  telehealth phone visit w/pt, Dr Yoon and isidro NP ------  Pt is s/p hospitalization- doing well on remodulin 26 ng  ----- ON XARELTO,  LASIX AND ALDACTONE  -------LOW DOSE PREDNISONE \par \par DEC 2020----  doing well on remodulin 30  ng  ----- ON XARELTO,  LASIX AND ALDACTONE  -------LOW DOSE PREDNISONE -----is following up with at [facility] for transplant listing-----final decision after the  and psych evaluation------- otherwise feels much better  \par \par FEB 2021-----WILL INCREASE REMODULIN TO 32 NG/KG/MIN ----------   ON XARELTO,  LASIX AND ALDACTONE  -------LOW DOSE PREDNISONE -----is following up with at [facility] for transplant listing-----final decision after the  and psych evaluation------- otherwise feels much better\par \par APRIL 2021----WILL INCREASE REMODULIN TO 33 NG/KG/MIN -----AND SILDENAFILC-----   ON XARELTO,  LASIX AND ALDACTONE  -------LOW DOSE PREDNISONE -----is following up with at [facility] for transplant listing-----final decision after the  and psych evaluation------- otherwise feels much better\par \par JULY 2021----ON REMODULIN  33 NG/KG/MIN ---Remodulin site appears clean--AND SILDENAFILC-----   ON XARELTO,  LASIX AND ALDACTONE  -------LOW DOSE PREDNISONE -----is following up with at [facility] for transplant listing-----final decision after the  and psych evaluation------- otherwise feels much better---HAS has a skin lesion on the left thigh which needs to be excised but can be done under local anesthesia thanks\par AUGUST------2021--   ON REMODULIN  33 NG/KG/MIN ---Remodulin site appears clean--AND SILDENAFILC-----   ON XARELTO, alternate day LASIX  -------LOW DOSE PREDNISONE -----is following up with at [facility] for transplant listing---------- complaining of URTI\par \par 8/2021 c/o dyspnea. On remodulin 34ng, starting prednisone 10mg and zpack yesterday. On oxygen 2 lpm. Taking diuretics every day----feels tachycardia on exertion\par \par 8/16/2021 pulm htn - doing well  on remodulin 36ng- having jaw pain\par \par 10/8/2021 pulm htn - on remodulin 36 ng with unchanged headaches and jaw pain, sildenafil 10 mg TID, furosemide 20 mg 3X weekly. she reports worsened dry and cough and dyspnea x1 week, comfortable but wheezing on exam. she has been on 10 mg of prednisone, increased from her usual dose of 7.5 mg. using ipratropium at least 4X daily. ----For her asthma I will consider her for for Dupixent\par

## 2021-10-07 NOTE — REVIEW OF SYSTEMS
[Dyspnea] : dyspnea [SOB on Exertion] : sob on exertion [Orthopnea] : orthopnea [Palpitations] : palpitations [GERD] : gerd [Anemia] : anemia [Fatigue] : fatigue [Recent Wt Gain (___ Lbs)] : ~T recent [unfilled] lb weight gain [Poor Appetite] : poor appetite [Fever] : no fever [Chills] : no chills [Dry Eyes] : no dry eyes [Cough] : no cough [Hemoptysis] : no hemoptysis [Sputum] : no sputum [Chest Discomfort] : no chest discomfort [Nasal Discharge] : no nasal discharge [Abdominal Pain] : no abdominal pain [Nausea] : no nausea [Vomiting] : no vomiting [Nocturia] : no nocturia [Arthralgias] : no arthralgias [Myalgias] : no myalgias [Raynaud] : no raynaud [Headache] : no headache [Focal Weakness] : no focal weakness [Numbness] : no numbness [Depression] : no depression [Anxiety] : no anxiety [Diabetes] : no diabetes

## 2021-10-08 ENCOUNTER — NON-APPOINTMENT (OUTPATIENT)
Age: 41
End: 2021-10-08

## 2021-10-08 LAB
ALBUMIN SERPL ELPH-MCNC: 4.4 G/DL
ALP BLD-CCNC: 64 U/L
ALT SERPL-CCNC: 8 U/L
ANION GAP SERPL CALC-SCNC: 14 MMOL/L
AST SERPL-CCNC: 12 U/L
BASOPHILS # BLD AUTO: 0.1 K/UL
BASOPHILS NFR BLD AUTO: 0.9 %
BILIRUB SERPL-MCNC: 0.5 MG/DL
BUN SERPL-MCNC: 11 MG/DL
CALCIUM SERPL-MCNC: 8.9 MG/DL
CHLORIDE SERPL-SCNC: 104 MMOL/L
CO2 SERPL-SCNC: 21 MMOL/L
CREAT SERPL-MCNC: 1.18 MG/DL
DEPRECATED KAPPA LC FREE/LAMBDA SER: 1.27 RATIO
EOSINOPHIL # BLD AUTO: 0.38 K/UL
EOSINOPHIL # BLD MANUAL: 390 /UL
EOSINOPHIL NFR BLD AUTO: 3.5 %
GLUCOSE SERPL-MCNC: 90 MG/DL
HCT VFR BLD CALC: 47.3 %
HGB BLD-MCNC: 14.7 G/DL
IGA SER QL IEP: 158 MG/DL
IGG SER QL IEP: 1348 MG/DL
IGM SER QL IEP: 144 MG/DL
IMM GRANULOCYTES NFR BLD AUTO: 0.3 %
KAPPA LC CSF-MCNC: 0.99 MG/DL
KAPPA LC SERPL-MCNC: 1.26 MG/DL
LYMPHOCYTES # BLD AUTO: 2.57 K/UL
LYMPHOCYTES NFR BLD AUTO: 23.6 %
MAN DIFF?: NORMAL
MCHC RBC-ENTMCNC: 21.9 PG
MCHC RBC-ENTMCNC: 31.1 GM/DL
MCV RBC AUTO: 70.6 FL
MONOCYTES # BLD AUTO: 0.92 K/UL
MONOCYTES NFR BLD AUTO: 8.5 %
NEUTROPHILS # BLD AUTO: 6.88 K/UL
NEUTROPHILS NFR BLD AUTO: 63.2 %
NT-PROBNP SERPL-MCNC: 72 PG/ML
PLATELET # BLD AUTO: 386 K/UL
POTASSIUM SERPL-SCNC: 4.3 MMOL/L
PROT SERPL-MCNC: 7.3 G/DL
RBC # BLD: 6.7 M/UL
RBC # FLD: 19.8 %
SODIUM SERPL-SCNC: 139 MMOL/L
WBC # FLD AUTO: 10.88 K/UL

## 2021-10-09 LAB — TOTAL IGE SMQN RAST: 1137 KU/L

## 2021-10-11 LAB
A ALTERNATA IGE QN: <0.1 KUA/L
A FUMIGATUS IGE QN: <0.1 KUA/L
BERMUDA GRASS IGE QN: <0.1 KUA/L
BOXELDER IGE QN: <0.1 KUA/L
C HERBARUM IGE QN: <0.1 KUA/L
CALIF WALNUT IGE QN: <0.1 KUA/L
CAT DANDER IGE QN: <0.1 KUA/L
CMN PIGWEED IGE QN: <0.1 KUA/L
COMMON RAGWEED IGE QN: <0.1 KUA/L
COTTONWOOD IGE QN: <0.1 KUA/L
D FARINAE IGE QN: <0.1 KUA/L
D PTERONYSS IGE QN: <0.1 KUA/L
DEPRECATED A ALTERNATA IGE RAST QL: 0
DEPRECATED A FUMIGATUS IGE RAST QL: 0
DEPRECATED BERMUDA GRASS IGE RAST QL: 0
DEPRECATED BOXELDER IGE RAST QL: 0
DEPRECATED C HERBARUM IGE RAST QL: 0
DEPRECATED CAT DANDER IGE RAST QL: 0
DEPRECATED COMMON PIGWEED IGE RAST QL: 0
DEPRECATED COMMON RAGWEED IGE RAST QL: 0
DEPRECATED COTTONWOOD IGE RAST QL: 0
DEPRECATED D FARINAE IGE RAST QL: 0
DEPRECATED D PTERONYSS IGE RAST QL: 0
DEPRECATED DOG DANDER IGE RAST QL: 0
DEPRECATED GOOSEFOOT IGE RAST QL: NORMAL
DEPRECATED LONDON PLANE IGE RAST QL: 0
DEPRECATED MOUSE URINE PROT IGE RAST QL: 0
DEPRECATED MUGWORT IGE RAST QL: 0
DEPRECATED P NOTATUM IGE RAST QL: 0
DEPRECATED RED CEDAR IGE RAST QL: 0
DEPRECATED ROACH IGE RAST QL: 0
DEPRECATED SHEEP SORREL IGE RAST QL: NORMAL
DEPRECATED SILVER BIRCH IGE RAST QL: 0
DEPRECATED TIMOTHY IGE RAST QL: 0
DEPRECATED WHITE ASH IGE RAST QL: 0
DEPRECATED WHITE OAK IGE RAST QL: 0
DOG DANDER IGE QN: <0.1 KUA/L
GOOSEFOOT IGE QN: 0.11 KUA/L
LONDON PLANE IGE QN: <0.1 KUA/L
MOUSE URINE PROT IGE QN: <0.1 KUA/L
MUGWORT IGE QN: <0.1 KUA/L
MULBERRY (T70) CLASS: 0
MULBERRY (T70) CONC: <0.1 KUA/L
P NOTATUM IGE QN: <0.1 KUA/L
RED CEDAR IGE QN: <0.1 KUA/L
ROACH IGE QN: <0.1 KUA/L
SHEEP SORREL IGE QN: 0.1 KUA/L
SILVER BIRCH IGE QN: <0.1 KUA/L
TIMOTHY IGE QN: <0.1 KUA/L
TREE ALLERG MIX1 IGE QL: 0
WHITE ASH IGE QN: <0.1 KUA/L
WHITE ELM IGE QN: 0
WHITE ELM IGE QN: <0.1 KUA/L
WHITE OAK IGE QN: <0.1 KUA/L

## 2021-10-11 NOTE — PROGRESS NOTE ADULT - NEUROLOGICAL DETAILS
Called patient .    Patient is still in the hospital.  With no fevers but with lots of pain urinating.    Instructed patient to call office once he is discharged from the hospital.   
Last Friday, the patient had urinary symptoms a low-grade temp.  He actually went to [facility] and was admitted Friday evening.  I am not sure if he is still an inpatient.  Can you please check with the patient for a status report today and we can update our plan moving forward  
thx
alert and oriented x 3/responds to verbal commands

## 2021-10-28 RX ORDER — IPRATROPIUM BROMIDE 0.5 MG/2.5ML
0.02 SOLUTION RESPIRATORY (INHALATION) 4 TIMES DAILY
Qty: 120 | Refills: 5 | Status: COMPLETED | COMMUNITY
Start: 2017-12-14 | End: 2021-10-28

## 2021-10-28 RX ORDER — AZITHROMYCIN 250 MG/1
250 TABLET, FILM COATED ORAL
Qty: 1 | Refills: 0 | Status: COMPLETED | COMMUNITY
Start: 2021-08-02 | End: 2021-10-28

## 2021-10-28 RX ORDER — GUAIFENESIN 600 MG/1
600 TABLET, EXTENDED RELEASE ORAL
Qty: 60 | Refills: 2 | Status: COMPLETED | COMMUNITY
Start: 2021-06-11 | End: 2021-10-28

## 2021-10-29 ENCOUNTER — APPOINTMENT (OUTPATIENT)
Dept: PULMONOLOGY | Facility: CLINIC | Age: 41
End: 2021-10-29
Payer: MEDICAID

## 2021-10-29 VITALS
BODY MASS INDEX: 35.79 KG/M2 | HEIGHT: 67 IN | SYSTOLIC BLOOD PRESSURE: 121 MMHG | RESPIRATION RATE: 18 BRPM | TEMPERATURE: 97.3 F | WEIGHT: 228 LBS | HEART RATE: 118 BPM | DIASTOLIC BLOOD PRESSURE: 91 MMHG

## 2021-10-29 DIAGNOSIS — J84.9 INTERSTITIAL PULMONARY DISEASE, UNSPECIFIED: ICD-10-CM

## 2021-10-29 DIAGNOSIS — E61.1 IRON DEFICIENCY: ICD-10-CM

## 2021-10-29 PROCEDURE — 99205 OFFICE O/P NEW HI 60 MIN: CPT

## 2021-10-29 NOTE — COUNSELING
[Potential consequences of obesity discussed] : Potential consequences of obesity discussed [Benefits of weight loss discussed] : Benefits of weight loss discussed [Encouraged to maintain food diary] : Encouraged to maintain food diary [Encouraged to increase physical activity] : Encouraged to increase physical activity [Encouraged to use exercise tracking device] : Encouraged to use exercise tracking device [Weigh Self Weekly] : weigh self weekly [Decrease Portions] : decrease portions [Keep Food Diary] : keep food diary [Patient motivation] : Patient motivation

## 2021-11-01 LAB
ALBUMIN SERPL ELPH-MCNC: 4.3 G/DL
ALP BLD-CCNC: 63 U/L
ALT SERPL-CCNC: 11 U/L
ANION GAP SERPL CALC-SCNC: 14 MMOL/L
AST SERPL-CCNC: 12 U/L
BASOPHILS # BLD AUTO: 0.09 K/UL
BASOPHILS NFR BLD AUTO: 0.7 %
BILIRUB SERPL-MCNC: 0.6 MG/DL
BUN SERPL-MCNC: 9 MG/DL
CALCIUM SERPL-MCNC: 8.8 MG/DL
CHLORIDE SERPL-SCNC: 103 MMOL/L
CO2 SERPL-SCNC: 20 MMOL/L
CREAT SERPL-MCNC: 1.04 MG/DL
EOSINOPHIL # BLD AUTO: 0.27 K/UL
EOSINOPHIL NFR BLD AUTO: 2.1 %
FERRITIN SERPL-MCNC: 23 NG/ML
GLUCOSE SERPL-MCNC: 82 MG/DL
HCT VFR BLD CALC: 49.4 %
HGB BLD-MCNC: 14.9 G/DL
IMM GRANULOCYTES NFR BLD AUTO: 0.2 %
LYMPHOCYTES # BLD AUTO: 2.17 K/UL
LYMPHOCYTES NFR BLD AUTO: 16.6 %
MAN DIFF?: NORMAL
MCHC RBC-ENTMCNC: 21.6 PG
MCHC RBC-ENTMCNC: 30.2 GM/DL
MCV RBC AUTO: 71.5 FL
MONOCYTES # BLD AUTO: 0.88 K/UL
MONOCYTES NFR BLD AUTO: 6.7 %
NEUTROPHILS # BLD AUTO: 9.64 K/UL
NEUTROPHILS NFR BLD AUTO: 73.7 %
NT-PROBNP SERPL-MCNC: 154 PG/ML
PLATELET # BLD AUTO: 466 K/UL
POTASSIUM SERPL-SCNC: 4 MMOL/L
PROT SERPL-MCNC: 7.2 G/DL
RBC # BLD: 6.91 M/UL
RBC # FLD: 19.1 %
SODIUM SERPL-SCNC: 137 MMOL/L
WBC # FLD AUTO: 13.08 K/UL

## 2021-11-02 NOTE — ASSESSMENT
[FreeTextEntry1] : Ms. Conway was seen and examined today by me.\par \par We addressed  lung transplant as a possible option during my discussion.  \par \par Her chart was reviewed thrice  as it is quite complex with records also sent from Dr. Capellan from [place].\par \par Would recommend the following for hypoxemia (2 l/min)  who grade 3 symptoms:\par \par 1- Not possible to transplant unless goal of 170-180 lbs in achieved due to difficulty with surgical closure after a clamshell operation and post-op complication risk\par \par 2- Consider sleep study as she has significant symptoms of frequent awakenings and insomnia. Looks like a nocturnal desat study was done (although unsure) and full sleep study last  done  in 2014 when first developing PH and lung disease\par \par 3-  Exercise rehab program if possible as insurance limited\par \par 4- I made dietary recommendations in reference to low fat meals (broiled chicken fish) and reduced caloric and Na intake \par \par 5- Will see again in 3 months and asked her to reduce her weight from 228 to 219 lbs during that interval\par \par Thanks for referring this very nice woman for potential lung transplantation.  I look forward to hoping to help her make some progress.  \par \par \par \par \par \par

## 2021-11-02 NOTE — CONSULT LETTER
[Dear  ___] : Dear ~JANESSA, [Courtesy Letter:] : I had the pleasure of seeing your patient, [unfilled], in my office today. [Please see my note below.] : Please see my note below. [Sincerely,] : Sincerely, [FreeTextEntry2] : Vivian Yoon MD, FCCP \par Director, Pulmonary Hypertension Program \par [facility] \par Division of Pulmonary, Critical Care and Sleep Medicine \par  Professor of Medicine \par [facility] [FreeTextEntry3] : Elpidio Lou MD\par Medical Director, Advanced Lung Disease and Transplantation\par Professor of Pulmonary Medicine and Cardiothoracic surgery \par [employer] \par [employer] [facility]\par \par \par

## 2021-11-02 NOTE — PHYSICAL EXAM
[No Acute Distress] : no acute distress [No JVD] : no jvd [Not Tender] : not tender [No Resp Distress] : no resp distress

## 2021-11-02 NOTE — ASSESSMENT
[FreeTextEntry1] : Ms. Conway was seen and examined today by me.\par \par We addressed  lung transplant as a possible option during my discussion.  \par \par Her chart was reviewed thrice  as it is quite complex with records also sent from Dr. Capellan from [place].\par \par Would recommend the following for hypoxemia (2 l/min)  who grade 3 symptoms:\par \par 1- Not possible to transplant unless goal of 170-180 lbs in achieved due to difficulty with surgical closure after a clamshell operation and post-op complication risk\par \par 2- Consider sleep study as she has significant symptoms of frequent awakenings and insomnia. Looks like a nocturnal desat study was done (although unsure) and full sleep study last  done  in 2014 when first developing PH and lung disease\par \par 3-  Exercise rehab program if possible as insurance limited\par \par 4- I made dietary recommendations in reference to low fat meals (broiled chicken fish) and reduced caloric and Na intake \par \par 5- Will see again in 3 months and asked her to reduce her weight from 228 to 219 lbs during that interval\par \par Thanks for referring this very nice woman for potential lung transplantation.  I look forward to hoping to help her make some progress.  \par \par \par \par \par \par

## 2021-11-02 NOTE — REASON FOR VISIT
[Follow-Up] : a follow-up visit [Asthma] : asthma [Shortness of Breath] : shortness of breath [Consultation] : a consultation [Pulmonary Hypertension] : pulmonary hypertension

## 2021-11-02 NOTE — CONSULT LETTER
[Dear  ___] : Dear ~JANESSA, [Courtesy Letter:] : I had the pleasure of seeing your patient, [unfilled], in my office today. [Please see my note below.] : Please see my note below. [Sincerely,] : Sincerely, [FreeTextEntry2] : Vivian Yoon MD, FCCP \par Director, Pulmonary Hypertension Program \par [facility] \par Division of Pulmonary, Critical Care and Sleep Medicine \par  Professor of Medicine \par [facility] [FreeTextEntry3] : Elpidio Lou MD\par Medical Director, Advanced Lung Disease and Transplantation\par Professor of Pulmonary Medicine and Cardiothoracic surgery \par [employer] \par [employer] at [facility]\par \par \par

## 2021-11-08 ENCOUNTER — APPOINTMENT (OUTPATIENT)
Dept: PULMONOLOGY | Facility: CLINIC | Age: 41
End: 2021-11-08
Payer: MEDICAID

## 2021-11-08 VITALS
DIASTOLIC BLOOD PRESSURE: 84 MMHG | HEART RATE: 107 BPM | TEMPERATURE: 97.8 F | BODY MASS INDEX: 35.16 KG/M2 | RESPIRATION RATE: 18 BRPM | HEIGHT: 67 IN | SYSTOLIC BLOOD PRESSURE: 131 MMHG | WEIGHT: 224 LBS

## 2021-11-08 PROCEDURE — 99215 OFFICE O/P EST HI 40 MIN: CPT

## 2021-11-08 NOTE — REVIEW OF SYSTEMS
[Fatigue] : fatigue [Recent Wt Gain (___ Lbs)] : ~T recent [unfilled] lb weight gain [Poor Appetite] : poor appetite [Dyspnea] : dyspnea [SOB on Exertion] : sob on exertion [Orthopnea] : orthopnea [Palpitations] : palpitations [GERD] : gerd [Anemia] : anemia [Fever] : no fever [Chills] : no chills [Dry Eyes] : no dry eyes [Cough] : no cough [Hemoptysis] : no hemoptysis [Sputum] : no sputum [Chest Discomfort] : no chest discomfort [Nasal Discharge] : no nasal discharge [Abdominal Pain] : no abdominal pain [Nausea] : no nausea [Vomiting] : no vomiting [Nocturia] : no nocturia [Arthralgias] : no arthralgias [Myalgias] : no myalgias [Raynaud] : no raynaud [Headache] : no headache [Focal Weakness] : no focal weakness [Numbness] : no numbness [Depression] : no depression [Anxiety] : no anxiety [Diabetes] : no diabetes

## 2021-11-08 NOTE — HISTORY OF PRESENT ILLNESS
[TextBox_4] : ------Patient is here for follow up of her pulmonary htn. ------------------INITIALLY  REFD  WITH   ECHO [DONE  OUTSIDE ] ELEVATED PASP  DILATED RV AND RA---------  HAS BEEN TOLD IN THE PAST SHE HAS  ASTHMA-----long-standing history of dyspnea on exertion-----------------has baseline tachycardia ----.....No history of fever, chills , rigors, chest pain, or hemoptysis. No h/o significant weight loss in last few months. No history of liver dysfunction , collagen vascular disorder or chronic thromboembolic disease. I would classify her dyspnea as WHO  FUNCTIONAL CLASS III-----------no calf tenderness----------SLEEP STUDY SHOWS AHI 0.6 but T 90  < 35 %-----DIAGNOSED  AS PAH ---------WAS ON ADMAPAS  NOW ON   SQ REMODULIN  [ SINCE MAY 2108]  ---S/P PACEMAKER PLACEMENT AT [facility]  AND ON METOPROLOL------\par \par ------cardiac CATH ---- 28/ july / 2014.......PAP -76 , Aortic pressure -84, left ventricle edp 10 MMHG , rt atrium - 13, PAP after NO - 42--- CO 3.19,   PVR 1928.78 ( dsc) 24.12 ( woods) PVR after NO ( 692.51( dsc), 8.66 ( enrique)\par \par CARDIAC  CATH 2108-----\par --CTA CHEST---- july 23/2014--- no evidence of PE. non specific ground glass opacities throughout the lungs....moderate pericardial effusion ---\par --repeat CT chest WITHOUT CONTRAST  12/2014 MOSAIC PATTERN UNCHANGED  FROM BEFORE\par vq scan 6/2015 abnormal- low prob PE\par cxr-6/2015 no acute disease \par ---------CT CHEST-----1. Enlarged main pulmonary artery compatible with pulmonary \par hypertension. -2. Patchy bilateral groundglass are unchanged since 06/20/2015 and are of \par uncertain origin. ------       \par \par ECHO ....July 24/ 2014----right ventricular enlargement with decrease ventricular systolic function...estimated rt ventricle systolic pre equals 113mmhg, ---\par \par ECHO 2018----  EF80, HHSG180\par \par SEPT 2104  6MWT- WALKED 301 METERS-------PSG   2014   LOW  T90 BUT NO ERICA\par \par \par \par  june 2015---SAW DR FREEMAN  AT [facility]  -cath showed   elevated  pressures but  pa ANGIOgram   should only distal clts  not a candidate for  surgery for CTEPH---RA 4 mmhg, RV 46/5, PA 49/24 mean PA 33, PCWP 13   WITH SUGGESTION OF VASODILATOR  RESPONSE  [ D/W DR LUONG]------AS PER HEATH FREEMAN  SHE HAS GROUP I PAH  WITH DISTAL CLOTS S RECOMMENDED   MEDICAL TREATMENT---PT ON  PROCARDIA  AND  ADMAPAS---DID NOT TOLERATE OPSUMIT IN PAST [[-[ DUE TO LOW BP] --------- -------------------------------.\par \par ALSO ON ANTICOAGULATION-follows coumadin clinic  ------- elevated INR today DENIES BLEEDING/HEMATURIA/MELENA\par \par She reports noncompliance to adempas since 8/1/15 has  basla tachycardia f/u with DR KRISHNAMURTHY  cardilogy ---\par \par ---PFT AUG 2016------FVC 2.44, FEV 1 1.61,   TLC 4.37, mild to moderate obstructive lung defect\par ----jan 2017 6MWD 250 m---\par ---2019  6MWT    ---165 METERS\par \par --------ECHO AUG 2016-----EF 82%-----PASP 26 mmHg\par --CATH 2014 ----MEAN PA 77, LVEDP 10, CO 3.14\par \par ------CT SCAN AUG 2016-------patchy bilateral groundglass unchanged since 6/20/2015-----enlarged PA 3.5 cm-----\par CT SCAN  2018--NO PE, PA 4.0 CM,MOSAIC ATTENUATION\par \par july 2018 s/p hospitalization for syncopal episode at home. She was transferred to [facility] where  a pacemaker was inserted d/t tachycardia and syncope. She was also subsequently started on remodulin SQ by Dr Chow--current rate 12ng. She is still have dyspnea.\par \par ------OCT 2018--------s/p hospitalization at [facility]------S/P PACEMAKER PLACEMENT AT [facility]------\par \par november 2019 here for acute visit- PAH w/remodulin @16ng infusing subcutaneously\par she is c/o chronic  palpitations, slightly improved.\par comes in today for cough and wheezing- started zpack and prednisone 2 days ago\par \par \par jan 17 2020- PAH- remodulin @16ng- SQ site - has stopped draining, less painful - completed doxy last week and is now on clindamycin ---\par She is also c/o asthma exacerbation- started prednisone 10mg and using nebs- symptoms somewhat improved from a few days ago\par \par Feb 25, 2020  PAH on Xarelto and Remodulin still at 16ng SQ site. New site looks good without infection. Old site has completely healed. Patient still having intermittent jaw claudication. She has more tachycardia with periods. She has Iron deficiency anemia and has stopped taking her Iron due to constipation. she otherwise has good exercise tolerance. Has oxygen at home and is using it.\par \par JULY 2020-----S/P    LEFT BREAST BIOPSY  -------    PAH ON XARELTO   , REMODULIN 19 NG/KG/MIN ----ON OPSUMIT AND  DIURETICS --  HAS GUM PAIN / \par \par aug 2020 pulm htn on remodulin 19ng no jaw pain, no diarrhea- site clean, kevin\par no further facial swelling - broken tooth right upper gum\par She is c/o worsening DOE_ uses oxygen only at night- off diuretics\par breast mass- s/p biopsy- negative for malignancy\par \par SEPT 2020--pulm htn  REMODULIN TO 21 NG/KG/MIN, using  OXYGEN  at 2 lpm,  ON XARELTO AND DIURETICS----[facility] LUNG TRANSPLANT APPOINTMENT   SEPT 21\par Currently on clindamycin for skin infection at old remodulin site\par \par 10/2020- Remodulin now up to 23 NG/KG/MIN, using  OXYGEN  at 2 lpm,  ON XARELTO, now off diuretics. S/p [place] transplant eval pending further testing.  remodulin site is d/c/i. Otherwise respiratory symptoms are stable. c/o palpitations, weakness and dyspnea worse\par \par 11/2020 telehealth phone visit w/pt, Dr Yoon and isidro NP\par Pt is s/p hospitalization- doing well on remodulin 24ng \par \par 11 24 /2020-  telehealth phone visit w/pt, Dr Yoon and isidro NP ------  Pt is s/p hospitalization- doing well on remodulin 26 ng  ----- ON XARELTO,  LASIX AND ALDACTONE  -------LOW DOSE PREDNISONE \par \par DEC 2020----  doing well on remodulin 30  ng  ----- ON XARELTO,  LASIX AND ALDACTONE  -------LOW DOSE PREDNISONE -----is following up with at [facility] for transplant listing-----final decision after the  and psych evaluation------- otherwise feels much better  \par \par FEB 2021-----WILL INCREASE REMODULIN TO 32 NG/KG/MIN ----------   ON XARELTO,  LASIX AND ALDACTONE  -------LOW DOSE PREDNISONE -----is following up with at [facility] for transplant listing-----final decision after the  and psych evaluation------- otherwise feels much better\par \par APRIL 2021----WILL INCREASE REMODULIN TO 33 NG/KG/MIN -----AND SILDENAFILC-----   ON XARELTO,  LASIX AND ALDACTONE  -------LOW DOSE PREDNISONE -----is following up with at [facility] for transplant listing-----final decision after the  and psych evaluation------- otherwise feels much better\par \par JULY 2021----ON REMODULIN  33 NG/KG/MIN ---Remodulin site appears clean--AND SILDENAFILC-----   ON XARELTO,  LASIX AND ALDACTONE  -------LOW DOSE PREDNISONE -----is following up with at [facility] for transplant listing-----final decision after the  and psych evaluation------- otherwise feels much better---HAS has a skin lesion on the left thigh which needs to be excised but can be done under local anesthesia thanks\par AUGUST------2021--   ON REMODULIN  33 NG/KG/MIN ---Remodulin site appears clean--AND SILDENAFILC-----   ON XARELTO, alternate day LASIX  -------LOW DOSE PREDNISONE -----is following up with at [facility] for transplant listing---------- complaining of URTI\par \par 8/2021 c/o dyspnea. On remodulin 34ng, starting prednisone 10mg and zpack yesterday. On oxygen 2 lpm. Taking diuretics every day----feels tachycardia on exertion\par \par 8/16/2021 pulm htn - doing well  on remodulin 36ng- having jaw pain\par \par 10/8/2021 pulm htn - on remodulin 36 ng with unchanged headaches and jaw pain, sildenafil 10 mg TID, furosemide 20 mg 3X weekly. she reports worsened dry and cough and dyspnea x1 week, comfortable but wheezing on exam. she has been on 10 mg of prednisone, increased from her usual dose of 7.5 mg. using ipratropium at least 4X daily. ----For her asthma I will consider her for for Dupixent\par \par \par 11/2021 Doing well. pulm htn - on remodulin 36 ng (having jaw pain), sildenafil 10 mg TID, furosemide 20 mg recently increased to 5x weekly which helped w/edema but still having abd bloating . She is also having palpitations- has not yet followed up with DR Chavis in cardiology.\par Asthma-occas wheezing- awaiting start of dupixent\par Up to date w/covid vac- declines influenza vac. s/p consult with lung transpalnt team but needs to lower BMI before being considered- pt has been referred to nutritionist but has not scheduled appt

## 2021-11-08 NOTE — DISCUSSION/SUMMARY
[FreeTextEntry1] : -----Assessment and Plan--------41  year-old lady with PULMONARY ARTERIAL HYPERTENSION  S/P PPM  FOR TACHYCARDIA AT [place] ON REMODULIN - S/P LEFT BREAST BIOPSY \par   doing well on remodulin 36  ng  ----- ON XARELTO,  LASIX AND ALDACTONE  -------LOW DOSE PREDNISONE -----is following up with at [facility] for transplant listing-----final decision after the  and psych evaluation\par \par \par 1]   PULM  ART HTN     --------- WHO GROUP I FUNCTIONAL CLASS III ----[ AS PER  DR FREEMAN  WHO DID PULM ANGIO SHE HAS FEATURES OF GROUP I  WITH SOME EVIDENCE OF DISTAL CLOTS] She is on remodulin 36ng -- sildenafil 10 mg TID, ON LASIX  20 mg 5  TIMES A WEEK  AND  ALDACTONE 25 MG  VERY DAY \par 2) Asthma - gave methylprednisolone IM for wheezing with clinical improvement. continue prednisone 10 mg rather than her previous dose of 7.5 mg. on ipratropium, added nebulized levalbuterol and budesonide. will start  DUPIXENT  as has been steroid dependent likely contributing to her weight, which is precluding lung transplant. \par 3) oxygen medically necessary given severe Pulm Htn, anemia and h/o nocturnal desaturation which could worsen Pulm HTN. ADvised oxygen 2 lpm x 24hrs\par 4) -She is  on xarelto---will be on lifelong anticoagulation---.\par 5)   --S/P PPM- follows EP clinic at [place] -\par 6) chronic palpitations- -- NEEDS TO SEE   Dr AMBAR HILTON - heart failure clinic\par 7)  she has features of ERICA- including sleep disturbance, nocturnal desat, and excessive fatigue-  HST was negative for ERICA in 2014 but had desaturation. Will try to repeat HST at next visit\par 8) follows  /[place] Lung Transplant Team - needs to lose wt to be considered for transplant listing- referred to nutritionist-----\par 9) Anemia: -------  . Will monitor for need for transfusions/iron\par 10) f/u in one month\par 11)  ASTHMA / WHEEZING HISTORY---- add singulair for asthma/high IGE- awaiting dupixent start- prednisone 10mg\par \par \par \par \par   Patient at this time  will follow  the above mentioned recommendations --and f/u in 1 month---If you have any questions  I can be reached  at #  [phone]. \par \par Vivian Yoon MD, FCCP \par Director, Pulmonary Hypertension Program \par [facility] \par Division of Pulmonary, Critical Care and Sleep Medicine \par  Professor of Medicine \par [employer]\par \par \par VANDANA Harrell-C\par \par

## 2021-11-15 ENCOUNTER — NON-APPOINTMENT (OUTPATIENT)
Age: 41
End: 2021-11-15

## 2021-12-01 PROCEDURE — G9005: CPT

## 2021-12-03 NOTE — DISCUSSION/SUMMARY
[id number] Felisha Carrizales  used. Patient  Signed out AMA, verblized understanding of discharge instructions and risks of leaving AMA. Daughter yelling at this RN stating I am just trying to scare the patient. Explained that I am just reading the AMA form to the patient via  but she continued to yell. Pt ambulatory out of ED with steady gait    [FreeTextEntry1] : -----Assessment and Plan--------41  year-old lady with PULMONARY ARTERIAL HYPERTENSION  S/P PPM  FOR TACHYCARDIA AT [place] ON REMODULIN - S/P LEFT BREAST BIOPSY \par   doing well on remodulin 36  ng  ----- ON XARELTO,  LASIX AND ALDACTONE  -------LOW DOSE PREDNISONE -----is following up with at [facility] for transplant listing-----final decision after the  and psych evaluation\par \par \par 1]   PULM  ART HTN     --------- WHO GROUP I FUNCTIONAL CLASS III ----[ AS PER  DR FREEMAN  WHO DID PULM ANGIO SHE HAS FEATURES OF GROUP I  WITH SOME EVIDENCE OF DISTAL CLOTS] She is on remodulin 36ng -- sildenafile 10 mg TID, ON LASIX  20 mg 3  TIMES A WEEK  AND  ALDACTONE 25 MG  VERY DAY \par 2) Asthma - gave methylprednisolone IM for wheezing with clinical improvement. continue prednisone 10 mg rather than her previous dose of 7.5 mg. on ipratropium, added nebulized levalbuterol and budesonide. will start  DUPIXENT  as has been steroid dependent likely contributing to her weight, which is precluding lung transplant. \par 3) oxygen medically necessary given severe Pulm Htn, anemia and h/o nocturnal desaturation which could worsen Pulm HTN. ADvised oxygen 2 lpm x 24hrs\par 4) -She is  on xarelto---will be on lifelong anticoagulation---.\par 5)   --S/P PPM- follows EP clinic at [place] -\par 6) chronic palpitations- -- NEEDS TO SEE   Dr AMBAR HILTON - heart failure clinic\par 7)  she has features of ERICA- including sleep disturbance, nocturnal desat, and excessive fatigue-  HST was negative for ERICA in 2014 but had desaturation. Will try to repeat HST at next visit\par 8)  has consult /[place] Lung Transplant Team -----\par 9) Anemia: -------  . Will monitor for need for transfusions/iron\par 10) f/u in one month\par 11) labs today\par \par \par \par \par   Patient at this time  will follow  the above mentioned recommendations --and f/u in 1 month---If you have any questions  I can be reached  at #  [phone]. \par \par Vivian Yoon MD, FCCP \par Director, Pulmonary Hypertension Program \par [facility] \par Division of Pulmonary, Critical Care and Sleep Medicine \par  Professor of Medicine \par [facility] [employer]\par \par \par CLINTON Harrell\par \par

## 2021-12-03 NOTE — REVIEW OF SYSTEMS
[Feeling Tired] : feeling tired [Negative] : Psychiatric [Fatigue] : fatigue [Dyspnea] : dyspnea [SOB on Exertion] : sob on exertion [Edema] : edema [Palpitations] : palpitations [PND] : no PND [Chronic Pain] : no chronic pain [Chest Pain] : no chest pain [Fainting] : no fainting [Fever] : no fever [Chills] : no chills [Dry Eyes] : no dry eyes [Cough] : no cough [Hemoptysis] : no hemoptysis [Sputum] : no sputum [Chest Discomfort] : no chest discomfort [Nasal Discharge] : no nasal discharge [Abdominal Pain] : no abdominal pain [Nausea] : no nausea [Vomiting] : no vomiting [Nocturia] : no nocturia [Arthralgias] : no arthralgias [Myalgias] : no myalgias [Raynaud] : no raynaud [Headache] : no headache [Focal Weakness] : no focal weakness [Numbness] : no numbness [Depression] : no depression [Anxiety] : no anxiety [Diabetes] : no diabetes

## 2021-12-03 NOTE — DISCUSSION/SUMMARY
[FreeTextEntry1] : -----Assessment and Plan--------41  year-old lady with PULMONARY ARTERIAL HYPERTENSION  S/P PPM  FOR TACHYCARDIA AT [place] ON REMODULIN - S/P LEFT BREAST BIOPSY \par   doing well on remodulin 36  ng  ----- ON XARELTO,  LASIX AND ALDACTONE  -------LOW DOSE PREDNISONE -----is following up with at [facility] for transplant listing-----final decision after the  and psych evaluation\par \par \par 1]   PULM  ART HTN     --------- WHO GROUP I FUNCTIONAL CLASS III ----[ AS PER  DR FREEMAN  WHO DID PULM ANGIO SHE HAS FEATURES OF GROUP I  WITH SOME EVIDENCE OF DISTAL CLOTS] She is on remodulin 36ng -- sildenafile 10 mg TID, ON LASIX  20 mg 3  TIMES A WEEK  AND  ALDACTONE 25 MG  VERY DAY \par 2) Asthma - gave methylprednisolone IM for wheezing with clinical improvement. continue prednisone 10 mg rather than her previous dose of 7.5 mg. on ipratropium, added nebulized levalbuterol and budesonide. will start  DUPIXENT  as has been steroid dependent likely contributing to her weight, which is precluding lung transplant. \par 3) oxygen medically necessary given severe Pulm Htn, anemia and h/o nocturnal desaturation which could worsen Pulm HTN. ADvised oxygen 2 lpm x 24hrs\par 4) -She is  on xarelto---will be on lifelong anticoagulation---.\par 5)   --S/P PPM- follows EP clinic at [place] -\par 6) chronic palpitations- -- NEEDS TO SEE   Dr AMBAR HILTON - heart failure clinic\par 7)  she has features of ERICA- including sleep disturbance, nocturnal desat, and excessive fatigue-  HST was negative for ERICA in 2014 but had desaturation. Will try to repeat HST at next visit\par 8)  has consult /[place] Lung Transplant Team -----\par 9) Anemia: -------  . Will monitor for need for transfusions/iron\par 10) f/u in one month\par 11) labs today\par \par \par \par \par   Patient at this time  will follow  the above mentioned recommendations --and f/u in 1 month---If you have any questions  I can be reached  at #  [phone]. \par \par Vivian Yoon MD, FCCP \par Director, Pulmonary Hypertension Program \par [facility] \par Division of Pulmonary, Critical Care and Sleep Medicine \par  Professor of Medicine \par [employer] [facility] [employer]\par \par \par CLINTON Harrell\par \par

## 2021-12-03 NOTE — DATA REVIEWED
[FreeTextEntry1] : Cardiac CATH-  July 28 2014.\par PAP -76 , Aortic pressure -84, left ventricle edp 10 MMHG , rt atrium - 13, PAP after NO - 42--- CO 3.19, PVR 1928.78 ( dsc) 24.12 ( woods) PVR after NO ( 692.51( dsc), 8.66 ( woods)\par \par ---2019 6MWT ---165 METERS

## 2021-12-03 NOTE — HISTORY OF PRESENT ILLNESS
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 Patient follows with Dr. Yoon who referred patient for lung transplant today.  Patient had been taking Admapas and now maintained on REMODULIN [ SINCE MAY 2108] -\par \par JULY 2021----ON REMODULIN 33 NG/KG/MIN ---Remodulin site appears clean--AND SILDENAFILC----- ON XARELTO, LASIX AND ALDACTONE -------LOW DOSE PREDNISONE -----is following up with at [facility] for transplant listing-----final decision after the  and psych evaluation------- otherwise feels much better---HAS has a skin lesion on the left thigh which needs to be excised but can be done under local anesthesia than\par APRIL 2021----WILL INCREASE REMODULIN TO 33 NG/KG/MIN -----AND SILDENAFILC----- ON XARELTO, LASIX AND ALDACTONE -------LOW DOSE PREDNISONE -----is following up with at [facility] for transplant listing-----final decision after the  and psych evaluation------- otherwise feels much better\par AUGUST------2021-- ON REMODULIN 33 NG/KG/MIN ---Remodulin site appears clean--AND SILDENAFILC----- ON XARELTO, alternate day LASIX -------LOW DOSE PREDNISONE -----is following up with at [facility] for transplant listing---------- complaining of URTI\par \par 8/2021 c/o dyspnea. On remodulin 34ng, starting prednisone 10mg and zpack yesterday. On oxygen 2 lpm. Taking diuretics every day----feels tachycardia on exertion\par \par 8/16/2021 pulm htn - doing well on remodulin 36ng- having jaw pain\par \par 10/8/2021 pulm htn - on remodulin 36 ng with unchanged headaches and jaw pain, sildenafil 10 mg TID, furosemide 20 mg 3X weekly. she reports worsened dry and cough and dyspnea x1 week, comfortable but wheezing on exam. she has been on 10 mg of prednisone, increased from her usual dose of 7.5 mg. using ipratropium at least 4X daily. ----For her asthma I will consider her for for Dupixent\par . \par \par \par  [TextBox_4] : ------Patient is here for follow up of her pulmonary htn. ------------------INITIALLY  REFD  WITH   ECHO [DONE  OUTSIDE ] ELEVATED PASP  DILATED RV AND RA---------  HAS BEEN TOLD IN THE PAST SHE HAS  ASTHMA-----long-standing history of dyspnea on exertion-----------------has baseline tachycardia ----.....No history of fever, chills , rigors, chest pain, or hemoptysis. No h/o significant weight loss in last few months. No history of liver dysfunction , collagen vascular disorder or chronic thromboembolic disease. I would classify her dyspnea as WHO  FUNCTIONAL CLASS III-----------no calf tenderness----------SLEEP STUDY SHOWS AHI 0.6 but T 90  < 35 %-----DIAGNOSED  AS PAH ---------WAS ON ADMAPAS  NOW ON   SQ REMODULIN  [ SINCE MAY 2108]  ---S/P PACEMAKER PLACEMENT AT [facility]  AND ON METOPROLOL------\par \par ------cardiac CATH ---- 28/ july / 2014.......PAP -76 , Aortic pressure -84, left ventricle edp 10 MMHG , rt atrium - 13, PAP after NO - 42--- CO 3.19,   PVR 1928.78 ( dsc) 24.12 ( woods) PVR after NO ( 692.51( dsc), 8.66 ( enrique)\par \par CARDIAC  CATH 2108-----\par --CTA CHEST---- july 23/2014--- no evidence of PE. non specific ground glass opacities throughout the lungs....moderate pericardial effusion ---\par --repeat CT chest WITHOUT CONTRAST  12/2014 MOSAIC PATTERN UNCHANGED  FROM BEFORE\par vq scan 6/2015 abnormal- low prob PE\par cxr-6/2015 no acute disease \par ---------CT CHEST-----1. Enlarged main pulmonary artery compatible with pulmonary \par hypertension. -2. Patchy bilateral groundglass are unchanged since 06/20/2015 and are of \par uncertain origin. ------       \par \par ECHO ....July 24/ 2014----right ventricular enlargement with decrease ventricular systolic function...estimated rt ventricle systolic pre equals 113mmhg, ---\par \par ECHO 2018----  [id number], [id number]\par \par SEPT 2104  6MWT- WALKED 301 METERS-------PSG   2014   LOW  T90 BUT NO ERICA\par \par \par \par  june 2015---SAW DR FREEMAN  AT [facility]  -cath showed   elevated  pressures but  pa ANGIOgram   should only distal clts  not a candidate for  surgery for CTEPH---RA 4 mmhg, RV 46/5, PA 49/24 mean PA 33, PCWP 13   WITH SUGGESTION OF VASODILATOR  RESPONSE  [ D/W DR LUONG]------AS PER HEATH FREEMAN  SHE HAS GROUP I PAH  WITH DISTAL CLOTS S RECOMMENDED   MEDICAL TREATMENT---PT ON  PROCARDIA  AND  ADMAPAS---DID NOT TOLERATE OPSUMIT IN PAST [[-[ DUE TO LOW BP] --------- -------------------------------.\par \par ALSO ON ANTICOAGULATION-follows coumadin clinic  ------- elevated INR today DENIES BLEEDING/HEMATURIA/MELENA\par \par She reports noncompliance to adempas since 8/1/15 has  basla tachycardia f/u with DR KRISHNAMURTHY  cardilogy ---\par \par ---PFT AUG 2016------FVC 2.44, FEV 1 1.61,   TLC 4.37, mild to moderate obstructive lung defect\par ----jan 2017 6MWD 250 m---\par ---2019  6MWT    ---165 METERS\par \par --------ECHO AUG 2016-----EF 82%-----PASP 26 mmHg\par --CATH 2014 ----MEAN PA 77, LVEDP 10, CO 3.14\par \par ------CT SCAN AUG 2016-------patchy bilateral groundglass unchanged since 6/20/2015-----enlarged PA 3.5 cm-----\par CT SCAN  2018--NO PE, PA 4.0 CM,MOSAIC ATTENUATION\par \par july 2018 s/p hospitalization for syncopal episode at home. She was transferred to [facility] where  a pacemaker was inserted d/t tachycardia and syncope. She was also subsequently started on remodulin SQ by Dr Chow--current rate 12ng. She is still have dyspnea.\par \par ------OCT 2018--------s/p hospitalization at [facility]------S/P PACEMAKER PLACEMENT AT [facility]------\par \par november 2019 here for acute visit- PAH w/remodulin @16ng infusing subcutaneously\par she is c/o chronic  palpitations, slightly improved.\par comes in today for cough and wheezing- started zpack and prednisone 2 days ago\par \par \par jan 17 2020- PAH- remodulin @16ng- SQ site - has stopped draining, less painful - completed doxy last week and is now on clindamycin ---\par She is also c/o asthma exacerbation- started prednisone 10mg and using nebs- symptoms somewhat improved from a few days ago\par \par Feb 25, 2020  PAH on Xarelto and Remodulin still at 16ng SQ site. New site looks good without infection. Old site has completely healed. Patient still having intermittent jaw claudication. She has more tachycardia with periods. She has Iron deficiency anemia and has stopped taking her Iron due to constipation. she otherwise has good exercise tolerance. Has oxygen at home and is using it.\par \par JULY 2020-----S/P    LEFT BREAST BIOPSY  -------    PAH ON XARELTO   , REMODULIN 19 NG/KG/MIN ----ON OPSUMIT AND  DIURETICS --  HAS GUM PAIN / \par \par aug 2020 pulm htn on remodulin 19ng no jaw pain, no diarrhea- site clean, kevin\par no further facial swelling - broken tooth right upper gum\par She is c/o worsening DOE_ uses oxygen only at night- off diuretics\par breast mass- s/p biopsy- negative for malignancy\par \par SEPT 2020--pulm htn  REMODULIN TO 21 NG/KG/MIN, using  OXYGEN  at 2 lpm,  ON XARELTO AND DIURETICS----[facility] LUNG TRANSPLANT APPOINTMENT   SEPT 21\par Currently on clindamycin for skin infection at old remodulin site\par \par 10/2020- Remodulin now up to 23 NG/KG/MIN, using  OXYGEN  at 2 lpm,  ON XARELTO, now off diuretics. S/p [place] transplant eval pending further testing.  remodulin site is d/c/i. Otherwise respiratory symptoms are stable. c/o palpitations, weakness and dyspnea worse\par \par 11/2020 telehealth phone visit w/pt, Dr Yoon and isidro NP\par Pt is s/p hospitalization- doing well on remodulin 24ng \par \par 11 24 /2020-  telehealth phone visit w/pt, Dr Yoon and isidro NP ------  Pt is s/p hospitalization- doing well on remodulin 26 ng  ----- ON XARELTO,  LASIX AND ALDACTONE  -------LOW DOSE PREDNISONE \par \par DEC 2020----  doing well on remodulin 30  ng  ----- ON XARELTO,  LASIX AND ALDACTONE  -------LOW DOSE PREDNISONE -----is following up with at [facility] for transplant listing-----final decision after the  and psych evaluation------- otherwise feels much better  \par \par FEB 2021-----WILL INCREASE REMODULIN TO 32 NG/KG/MIN ----------   ON XARELTO,  LASIX AND ALDACTONE  -------LOW DOSE PREDNISONE -----is following up with at [facility] for transplant listing-----final decision after the  and psych evaluation------- otherwise feels much better\par \par APRIL 2021----WILL INCREASE REMODULIN TO 33 NG/KG/MIN -----AND SILDENAFILC-----   ON XARELTO,  LASIX AND ALDACTONE  -------LOW DOSE PREDNISONE -----is following up with at [facility] for transplant listing-----final decision after the  and psych evaluation------- otherwise feels much better\par \par JULY 2021----ON REMODULIN  33 NG/KG/MIN ---Remodulin site appears clean--AND SILDENAFILC-----   ON XARELTO,  LASIX AND ALDACTONE  -------LOW DOSE PREDNISONE -----is following up with at [facility] for transplant listing-----final decision after the  and psych evaluation------- otherwise feels much better---HAS has a skin lesion on the left thigh which needs to be excised but can be done under local anesthesia thanks\par AUGUST------2021--   ON REMODULIN  33 NG/KG/MIN ---Remodulin site appears clean--AND SILDENAFILC-----   ON XARELTO, alternate day LASIX  -------LOW DOSE PREDNISONE -----is following up with at [facility] for transplant listing---------- complaining of URTI\par \par 8/2021 c/o dyspnea. On remodulin 34ng, starting prednisone 10mg and zpack yesterday. On oxygen 2 lpm. Taking diuretics every day----feels tachycardia on exertion\par \par 8/16/2021 pulm htn - doing well  on remodulin 36ng- having jaw pain\par \par 10/8/2021 pulm htn - on remodulin 36 ng with unchanged headaches and jaw pain, sildenafil 10 mg TID, furosemide 20 mg 3X weekly. she reports worsened dry and cough and dyspnea x1 week, comfortable but wheezing on exam. she has been on 10 mg of prednisone, increased from her usual dose of 7.5 mg. using ipratropium at least 4X daily. ----For her asthma I will consider her for for Dupixent\par

## 2021-12-03 NOTE — HISTORY OF PRESENT ILLNESS
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 Patient follows with Dr. Yoon who referred patient for lung transplant today.  Patient had been taking Admapas and now maintained on REMODULIN [ SINCE MAY 2108] -\par \par JULY 2021----ON REMODULIN 33 NG/KG/MIN ---Remodulin site appears clean--AND SILDENAFILC----- ON XARELTO, LASIX AND ALDACTONE -------LOW DOSE PREDNISONE -----is following up with at [facility] for transplant listing-----final decision after the  and psych evaluation------- otherwise feels much better---HAS has a skin lesion on the left thigh which needs to be excised but can be done under local anesthesia than\par APRIL 2021----WILL INCREASE REMODULIN TO 33 NG/KG/MIN -----AND SILDENAFILC----- ON XARELTO, LASIX AND ALDACTONE -------LOW DOSE PREDNISONE -----is following up with at [facility] for transplant listing-----final decision after the  and psych evaluation------- otherwise feels much better\par AUGUST------2021-- ON REMODULIN 33 NG/KG/MIN ---Remodulin site appears clean--AND SILDENAFILC----- ON XARELTO, alternate day LASIX -------LOW DOSE PREDNISONE -----is following up with at [facility] for transplant listing---------- complaining of URTI\par \par 8/2021 c/o dyspnea. On remodulin 34ng, starting prednisone 10mg and zpack yesterday. On oxygen 2 lpm. Taking diuretics every day----feels tachycardia on exertion\par \par 8/16/2021 pulm htn - doing well on remodulin 36ng- having jaw pain\par \par 10/8/2021 pulm htn - on remodulin 36 ng with unchanged headaches and jaw pain, sildenafil 10 mg TID, furosemide 20 mg 3X weekly. she reports worsened dry and cough and dyspnea x1 week, comfortable but wheezing on exam. she has been on 10 mg of prednisone, increased from her usual dose of 7.5 mg. using ipratropium at least 4X daily. ----For her asthma I will consider her for for Dupixent\par . \par \par \par  [TextBox_4] : ------Patient is here for follow up of her pulmonary htn. ------------------INITIALLY  REFD  WITH   ECHO [DONE  OUTSIDE ] ELEVATED PASP  DILATED RV AND RA---------  HAS BEEN TOLD IN THE PAST SHE HAS  ASTHMA-----long-standing history of dyspnea on exertion-----------------has baseline tachycardia ----.....No history of fever, chills , rigors, chest pain, or hemoptysis. No h/o significant weight loss in last few months. No history of liver dysfunction , collagen vascular disorder or chronic thromboembolic disease. I would classify her dyspnea as WHO  FUNCTIONAL CLASS III-----------no calf tenderness----------SLEEP STUDY SHOWS AHI 0.6 but T 90  < 35 %-----DIAGNOSED  AS PAH ---------WAS ON ADMAPAS  NOW ON   SQ REMODULIN  [ SINCE MAY 2108]  ---S/P PACEMAKER PLACEMENT AT [facility]  AND ON METOPROLOL------\par \par ------cardiac CATH ---- 28/ july / 2014.......PAP -76 , Aortic pressure -84, left ventricle edp 10 MMHG , rt atrium - 13, PAP after NO - 42--- CO 3.19,   PVR 1928.78 ( dsc) 24.12 ( woods) PVR after NO ( 692.51( dsc), 8.66 ( enrique)\par \par CARDIAC  CATH 2108-----\par --CTA CHEST---- july 23/2014--- no evidence of PE. non specific ground glass opacities throughout the lungs....moderate pericardial effusion ---\par --repeat CT chest WITHOUT CONTRAST  12/2014 MOSAIC PATTERN UNCHANGED  FROM BEFORE\par vq scan 6/2015 abnormal- low prob PE\par cxr-6/2015 no acute disease \par ---------CT CHEST-----1. Enlarged main pulmonary artery compatible with pulmonary \par hypertension. -2. Patchy bilateral groundglass are unchanged since 06/20/2015 and are of \par uncertain origin. ------       \par \par ECHO ....July 24/ 2014----right ventricular enlargement with decrease ventricular systolic function...estimated rt ventricle systolic pre equals 113mmhg, ---\par \par ECHO 2018----  EF80, [id number]\par \par SEPT 2104  6MWT- WALKED 301 METERS-------PSG   2014   LOW  T90 BUT NO ERICA\par \par \par \par  june 2015---SAW DR FREEMAN  AT [facility]  -cath showed   elevated  pressures but  pa ANGIOgram   should only distal clts  not a candidate for  surgery for CTEPH---RA 4 mmhg, RV 46/5, PA 49/24 mean PA 33, PCWP 13   WITH SUGGESTION OF VASODILATOR  RESPONSE  [ D/W DR LUONG]------AS PER HEATH FREEMAN  SHE HAS GROUP I PAH  WITH DISTAL CLOTS S RECOMMENDED   MEDICAL TREATMENT---PT ON  PROCARDIA  AND  ADMAPAS---DID NOT TOLERATE OPSUMIT IN PAST [[-[ DUE TO LOW BP] --------- -------------------------------.\par \par ALSO ON ANTICOAGULATION-follows coumadin clinic  ------- elevated INR today DENIES BLEEDING/HEMATURIA/MELENA\par \par She reports noncompliance to adempas since 8/1/15 has  basla tachycardia f/u with DR KRISHNAMURTHY  cardilogy ---\par \par ---PFT AUG 2016------FVC 2.44, FEV 1 1.61,   TLC 4.37, mild to moderate obstructive lung defect\par ----jan 2017 6MWD 250 m---\par ---2019  6MWT    ---165 METERS\par \par --------ECHO AUG 2016-----EF 82%-----PASP 26 mmHg\par --CATH 2014 ----MEAN PA 77, LVEDP 10, CO 3.14\par \par ------CT SCAN AUG 2016-------patchy bilateral groundglass unchanged since 6/20/2015-----enlarged PA 3.5 cm-----\par CT SCAN  2018--NO PE, PA 4.0 CM,MOSAIC ATTENUATION\par \par july 2018 s/p hospitalization for syncopal episode at home. She was transferred to [facility] where  a pacemaker was inserted d/t tachycardia and syncope. She was also subsequently started on remodulin SQ by Dr Chow--current rate 12ng. She is still have dyspnea.\par \par ------OCT 2018--------s/p hospitalization at [facility]------S/P PACEMAKER PLACEMENT AT [facility]------\par \par november 2019 here for acute visit- PAH w/remodulin @16ng infusing subcutaneously\par she is c/o chronic  palpitations, slightly improved.\par comes in today for cough and wheezing- started zpack and prednisone 2 days ago\par \par \par jan 17 2020- PAH- remodulin @16ng- SQ site - has stopped draining, less painful - completed doxy last week and is now on clindamycin ---\par She is also c/o asthma exacerbation- started prednisone 10mg and using nebs- symptoms somewhat improved from a few days ago\par \par Feb 25, 2020  PAH on Xarelto and Remodulin still at 16ng SQ site. New site looks good without infection. Old site has completely healed. Patient still having intermittent jaw claudication. She has more tachycardia with periods. She has Iron deficiency anemia and has stopped taking her Iron due to constipation. she otherwise has good exercise tolerance. Has oxygen at home and is using it.\par \par JULY 2020-----S/P    LEFT BREAST BIOPSY  -------    PAH ON XARELTO   , REMODULIN 19 NG/KG/MIN ----ON OPSUMIT AND  DIURETICS --  HAS GUM PAIN / \par \par aug 2020 pulm htn on remodulin 19ng no jaw pain, no diarrhea- site clean, kevin\par no further facial swelling - broken tooth right upper gum\par She is c/o worsening DOE_ uses oxygen only at night- off diuretics\par breast mass- s/p biopsy- negative for malignancy\par \par SEPT 2020--pulm htn  REMODULIN TO 21 NG/KG/MIN, using  OXYGEN  at 2 lpm,  ON XARELTO AND DIURETICS----[facility] LUNG TRANSPLANT APPOINTMENT   SEPT 21\par Currently on clindamycin for skin infection at old remodulin site\par \par 10/2020- Remodulin now up to 23 NG/KG/MIN, using  OXYGEN  at 2 lpm,  ON XARELTO, now off diuretics. S/p [place] transplant eval pending further testing.  remodulin site is d/c/i. Otherwise respiratory symptoms are stable. c/o palpitations, weakness and dyspnea worse\par \par 11/2020 telehealth phone visit w/pt, Dr Yoon and isidro NP\par Pt is s/p hospitalization- doing well on remodulin 24ng \par \par 11 24 /2020-  telehealth phone visit w/pt, Dr Yoon and isidro NP ------  Pt is s/p hospitalization- doing well on remodulin 26 ng  ----- ON XARELTO,  LASIX AND ALDACTONE  -------LOW DOSE PREDNISONE \par \par DEC 2020----  doing well on remodulin 30  ng  ----- ON XARELTO,  LASIX AND ALDACTONE  -------LOW DOSE PREDNISONE -----is following up with at [facility] for transplant listing-----final decision after the  and psych evaluation------- otherwise feels much better  \par \par FEB 2021-----WILL INCREASE REMODULIN TO 32 NG/KG/MIN ----------   ON XARELTO,  LASIX AND ALDACTONE  -------LOW DOSE PREDNISONE -----is following up with at [facility] for transplant listing-----final decision after the  and psych evaluation------- otherwise feels much better\par \par APRIL 2021----WILL INCREASE REMODULIN TO 33 NG/KG/MIN -----AND SILDENAFILC-----   ON XARELTO,  LASIX AND ALDACTONE  -------LOW DOSE PREDNISONE -----is following up with at [facility] for transplant listing-----final decision after the  and psych evaluation------- otherwise feels much better\par \par JULY 2021----ON REMODULIN  33 NG/KG/MIN ---Remodulin site appears clean--AND SILDENAFILC-----   ON XARELTO,  LASIX AND ALDACTONE  -------LOW DOSE PREDNISONE -----is following up with at [facility] for transplant listing-----final decision after the  and psych evaluation------- otherwise feels much better---HAS has a skin lesion on the left thigh which needs to be excised but can be done under local anesthesia thanks\par AUGUST------2021--   ON REMODULIN  33 NG/KG/MIN ---Remodulin site appears clean--AND SILDENAFILC-----   ON XARELTO, alternate day LASIX  -------LOW DOSE PREDNISONE -----is following up with at [facility] for transplant listing---------- complaining of URTI\par \par 8/2021 c/o dyspnea. On remodulin 34ng, starting prednisone 10mg and zpack yesterday. On oxygen 2 lpm. Taking diuretics every day----feels tachycardia on exertion\par \par 8/16/2021 pulm htn - doing well  on remodulin 36ng- having jaw pain\par \par 10/8/2021 pulm htn - on remodulin 36 ng with unchanged headaches and jaw pain, sildenafil 10 mg TID, furosemide 20 mg 3X weekly. she reports worsened dry and cough and dyspnea x1 week, comfortable but wheezing on exam. she has been on 10 mg of prednisone, increased from her usual dose of 7.5 mg. using ipratropium at least 4X daily. ----For her asthma I will consider her for for Dupixent\par

## 2021-12-09 ENCOUNTER — APPOINTMENT (OUTPATIENT)
Dept: PULMONOLOGY | Facility: CLINIC | Age: 41
End: 2021-12-09

## 2021-12-09 ENCOUNTER — NON-APPOINTMENT (OUTPATIENT)
Age: 41
End: 2021-12-09

## 2021-12-14 ENCOUNTER — EMERGENCY (EMERGENCY)
Facility: HOSPITAL | Age: 41
LOS: 1 days | Discharge: HOME | End: 2021-12-14
Attending: EMERGENCY MEDICINE
Payer: MEDICAID

## 2021-12-14 ENCOUNTER — NON-APPOINTMENT (OUTPATIENT)
Age: 41
End: 2021-12-14

## 2021-12-14 VITALS
OXYGEN SATURATION: 99 % | RESPIRATION RATE: 16 BRPM | SYSTOLIC BLOOD PRESSURE: 126 MMHG | HEIGHT: 65 IN | HEART RATE: 90 BPM | DIASTOLIC BLOOD PRESSURE: 88 MMHG | TEMPERATURE: 98 F

## 2021-12-14 VITALS
OXYGEN SATURATION: 98 % | TEMPERATURE: 98 F | HEART RATE: 90 BPM | RESPIRATION RATE: 16 BRPM | SYSTOLIC BLOOD PRESSURE: 110 MMHG | DIASTOLIC BLOOD PRESSURE: 75 MMHG

## 2021-12-14 DIAGNOSIS — Z98.51 TUBAL LIGATION STATUS: Chronic | ICD-10-CM

## 2021-12-14 DIAGNOSIS — Z95.0 PRESENCE OF CARDIAC PACEMAKER: Chronic | ICD-10-CM

## 2021-12-14 LAB
ALBUMIN SERPL ELPH-MCNC: 4.3 G/DL — SIGNIFICANT CHANGE UP (ref 3.3–5)
ALP SERPL-CCNC: 63 U/L — SIGNIFICANT CHANGE UP (ref 40–120)
ALT FLD-CCNC: 11 U/L — SIGNIFICANT CHANGE UP (ref 10–45)
ANION GAP SERPL CALC-SCNC: 15 MMOL/L — SIGNIFICANT CHANGE UP (ref 5–17)
AST SERPL-CCNC: 13 U/L — SIGNIFICANT CHANGE UP (ref 10–40)
BASOPHILS # BLD AUTO: 0 K/UL — SIGNIFICANT CHANGE UP (ref 0–0.2)
BASOPHILS NFR BLD AUTO: 0 % — SIGNIFICANT CHANGE UP (ref 0–2)
BILIRUB SERPL-MCNC: 0.6 MG/DL — SIGNIFICANT CHANGE UP (ref 0.2–1.2)
BUN SERPL-MCNC: 9 MG/DL — SIGNIFICANT CHANGE UP (ref 7–23)
CALCIUM SERPL-MCNC: 9.1 MG/DL — SIGNIFICANT CHANGE UP (ref 8.4–10.5)
CHLORIDE SERPL-SCNC: 105 MMOL/L — SIGNIFICANT CHANGE UP (ref 96–108)
CO2 SERPL-SCNC: 19 MMOL/L — LOW (ref 22–31)
CREAT SERPL-MCNC: 0.93 MG/DL — SIGNIFICANT CHANGE UP (ref 0.5–1.3)
EOSINOPHIL # BLD AUTO: 0.09 K/UL — SIGNIFICANT CHANGE UP (ref 0–0.5)
EOSINOPHIL NFR BLD AUTO: 0.9 % — SIGNIFICANT CHANGE UP (ref 0–6)
GAS PNL BLDV: SIGNIFICANT CHANGE UP
GLUCOSE SERPL-MCNC: 87 MG/DL — SIGNIFICANT CHANGE UP (ref 70–99)
HCT VFR BLD CALC: 45.7 % — HIGH (ref 34.5–45)
HGB BLD-MCNC: 14.2 G/DL — SIGNIFICANT CHANGE UP (ref 11.5–15.5)
LYMPHOCYTES # BLD AUTO: 2.81 K/UL — SIGNIFICANT CHANGE UP (ref 1–3.3)
LYMPHOCYTES # BLD AUTO: 28.3 % — SIGNIFICANT CHANGE UP (ref 13–44)
MCHC RBC-ENTMCNC: 21.7 PG — LOW (ref 27–34)
MCHC RBC-ENTMCNC: 31.1 GM/DL — LOW (ref 32–36)
MCV RBC AUTO: 69.8 FL — LOW (ref 80–100)
MONOCYTES # BLD AUTO: 0.35 K/UL — SIGNIFICANT CHANGE UP (ref 0–0.9)
MONOCYTES NFR BLD AUTO: 3.5 % — SIGNIFICANT CHANGE UP (ref 2–14)
NEUTROPHILS # BLD AUTO: 6.69 K/UL — SIGNIFICANT CHANGE UP (ref 1.8–7.4)
NEUTROPHILS NFR BLD AUTO: 67.3 % — SIGNIFICANT CHANGE UP (ref 43–77)
PLATELET # BLD AUTO: 338 K/UL — SIGNIFICANT CHANGE UP (ref 150–400)
POTASSIUM SERPL-MCNC: 3.4 MMOL/L — LOW (ref 3.5–5.3)
POTASSIUM SERPL-SCNC: 3.4 MMOL/L — LOW (ref 3.5–5.3)
PROT SERPL-MCNC: 7.4 G/DL — SIGNIFICANT CHANGE UP (ref 6–8.3)
RBC # BLD: 6.55 M/UL — HIGH (ref 3.8–5.2)
RBC # FLD: 19 % — HIGH (ref 10.3–14.5)
SARS-COV-2 RNA SPEC QL NAA+PROBE: SIGNIFICANT CHANGE UP
SODIUM SERPL-SCNC: 139 MMOL/L — SIGNIFICANT CHANGE UP (ref 135–145)
TROPONIN T, HIGH SENSITIVITY RESULT: <6 NG/L — SIGNIFICANT CHANGE UP (ref 0–51)
WBC # BLD: 9.94 K/UL — SIGNIFICANT CHANGE UP (ref 3.8–10.5)
WBC # FLD AUTO: 9.94 K/UL — SIGNIFICANT CHANGE UP (ref 3.8–10.5)

## 2021-12-14 PROCEDURE — 80053 COMPREHEN METABOLIC PANEL: CPT

## 2021-12-14 PROCEDURE — 84484 ASSAY OF TROPONIN QUANT: CPT

## 2021-12-14 PROCEDURE — 71046 X-RAY EXAM CHEST 2 VIEWS: CPT

## 2021-12-14 PROCEDURE — 84295 ASSAY OF SERUM SODIUM: CPT

## 2021-12-14 PROCEDURE — 82803 BLOOD GASES ANY COMBINATION: CPT

## 2021-12-14 PROCEDURE — 93005 ELECTROCARDIOGRAM TRACING: CPT

## 2021-12-14 PROCEDURE — 82330 ASSAY OF CALCIUM: CPT

## 2021-12-14 PROCEDURE — 84132 ASSAY OF SERUM POTASSIUM: CPT

## 2021-12-14 PROCEDURE — 82435 ASSAY OF BLOOD CHLORIDE: CPT

## 2021-12-14 PROCEDURE — 99285 EMERGENCY DEPT VISIT HI MDM: CPT

## 2021-12-14 PROCEDURE — 99284 EMERGENCY DEPT VISIT MOD MDM: CPT | Mod: 25

## 2021-12-14 PROCEDURE — 82565 ASSAY OF CREATININE: CPT

## 2021-12-14 PROCEDURE — 94640 AIRWAY INHALATION TREATMENT: CPT

## 2021-12-14 PROCEDURE — U0005: CPT

## 2021-12-14 PROCEDURE — 85014 HEMATOCRIT: CPT

## 2021-12-14 PROCEDURE — U0003: CPT

## 2021-12-14 PROCEDURE — 71046 X-RAY EXAM CHEST 2 VIEWS: CPT | Mod: 26

## 2021-12-14 PROCEDURE — 85018 HEMOGLOBIN: CPT

## 2021-12-14 PROCEDURE — 82947 ASSAY GLUCOSE BLOOD QUANT: CPT

## 2021-12-14 PROCEDURE — 85025 COMPLETE CBC W/AUTO DIFF WBC: CPT

## 2021-12-14 PROCEDURE — 83605 ASSAY OF LACTIC ACID: CPT

## 2021-12-14 PROCEDURE — 93010 ELECTROCARDIOGRAM REPORT: CPT

## 2021-12-14 RX ORDER — TIOTROPIUM BROMIDE 18 UG/1
1 CAPSULE ORAL; RESPIRATORY (INHALATION) ONCE
Refills: 0 | Status: COMPLETED | OUTPATIENT
Start: 2021-12-14 | End: 2021-12-14

## 2021-12-14 RX ORDER — IPRATROPIUM BROMIDE 0.2 MG/ML
500 SOLUTION, NON-ORAL INHALATION ONCE
Refills: 0 | Status: COMPLETED | OUTPATIENT
Start: 2021-12-14 | End: 2021-12-14

## 2021-12-14 RX ADMIN — TIOTROPIUM BROMIDE 1 CAPSULE(S): 18 CAPSULE ORAL; RESPIRATORY (INHALATION) at 23:09

## 2021-12-14 RX ADMIN — Medication 500 MICROGRAM(S): at 23:23

## 2021-12-14 NOTE — ED ADULT NURSE NOTE - NSICDXPASTMEDICALHX_GEN_ALL_CORE_FT
PAST MEDICAL HISTORY:  Anemia     Asthma On home O2 nightly at 2L via NC    Asthma     CAD (coronary artery disease)     Corneal disorder     H/O pulmonary hypertension     History of tachycardia     On supplemental oxygen by nasal cannula O2 @ 2L    Pacemaker     PE (pulmonary thromboembolism) on Xarelto 10 mg daily    Pulmonary embolism     Pulmonary embolism     Pulmonary hypertension     Right heart failure     Sinusitis     Skin mass left upper thigh mass    Tachycardia

## 2021-12-14 NOTE — ED PROVIDER NOTE - PHYSICAL EXAMINATION
GENERAL: Awake, alert, NAD  HEENT: NC/AT, moist mucous membranes, PERRL, EOMI  LUNGS: bl wheezing worse on bases  CARDIAC: RRR, no m/r/g  ABDOMEN: Soft, non tender, non distended, no rebound, no guarding  BACK: No midline spinal tenderness  EXT: No edema, no calf tenderness, 2+ DP pulses bilaterally, no deformities.  NEURO: A&Ox3. Moving all extremities.  SKIN: Warm and dry.

## 2021-12-14 NOTE — ED ADULT NURSE NOTE - NSICDXPASTSURGICALHX_GEN_ALL_CORE_FT
PAST SURGICAL HISTORY:  H/O tubal ligation     History of pacemaker 12/19 - [employer] model [employer]gevity 4469    History of tubal ligation     Pacemaker

## 2021-12-14 NOTE — ED PROVIDER NOTE - PROGRESS NOTE DETAILS
ekg unchanged from previous. no signs of ACS at this time. trop neg. evaluated s/p atrovent and found w improved breath sounds bl ekg unchanged from previous. no signs of ACS at this time. no right heart strain, trop neg. evaluated s/p atrovent and found w improved breath sounds bl

## 2021-12-14 NOTE — ED PROVIDER NOTE - ATTENDING CONTRIBUTION TO CARE
patient with mild wheezing and chest tightness  ce within normal limits   patient improved with therapies in emergency department  relayed results to patient and will follow up with primary medical doctor  The patient was serially evaluated throughout emergency department course. There was no acute deterioration up to this time in the department. Patient has demonstrated clinical improvement and is stable, feels better at this time according to emergency department team. Agree with goals/plan of emergency department care as described in this physician's electronic medical record, including diagnostics, therapeutics and consultation as clinically warranted. Will discharge home with close outpatient follow up with primary care physician/provider and specialist if necessary. The patient and/or family was educated on concerning signs and features to return to the emergency department, in layman terms, including but not limited to: nausea, vomiting, fever, chills, persistent/worsening symptoms or any concerns at all. No immediate life threatening issues present on history, clinical exam, or any diagnostic evaluation. The patient is a safe disposition home, has capacity and insight into their condition, is ambulatory in the Emergency Department with no further questions and will follow up with their doctor(s) this week. Diagnosis, prognosis, natural history and treatment was discussed with patient and/or family. The patient and/or family were given the opportunity to ask questions and have them answered in full. The patient and/or family are with capacity and insight into the situation, treatment, risks, benefits, alternative therapies, and understand that they can ask any further questions if needed. Patient and/or family/guardian understands anticipatory guidance and was given strict return and follow up precautions. The patient and/or family/guardian has been informed, in layman terms, of all concerning signs and symptoms to return to Emergency Department, the necessity to follow up with the PMD/Clinic/follow up provided within 2-3 days was explained, and the patient and/or family/guardian reports understanding of above with capacity and insight. The patient and/or family/guardian were informed of any results of their tests and are were encouraged to follow up on the findings with their doctor as well as the need to inform their doctor of any results. The patient and/or family/guardian are aware of the need to follow up with repeat testing as applicable and report understanding of the above with capacity and insight. The patient and/or family/guardian was made aware of any pending test results at the time of discharge and of the need to call back for the final results a well as the need to inform their doctor of the results.

## 2021-12-14 NOTE — ED PROVIDER NOTE - NS ED ROS FT
CONST: no fevers, no chills  EYES: no pain, no vision changes  ENT: no sore throat, no ear pain, no change in hearing  CV: pos cp, palpitations x3  RESP: pos SOB, wheezing  ABD: no abdominal pain, no nausea, no vomiting, no diarrhea  : no dysuria, no flank pain, no hematuria  MSK: no back pain, no extremity pain  NEURO: pos HA  SKIN:  no rash CONST: no fevers, no chills  EYES: no pain, no vision changes  ENT: no sore throat, no ear pain, no change in hearing  CV: pos cp, palpitations x3, non-tachycardic   RESP: pos SOB, wheezing  ABD: no abdominal pain, no nausea, no vomiting, no diarrhea  : no dysuria, no flank pain, no hematuria  MSK: no back pain, no extremity pain  NEURO: pos HA  SKIN:  no rash

## 2021-12-14 NOTE — ED PROVIDER NOTE - RAPID ASSESSMENT
41y F with pmhx of pulmonary HTN, CHF on 2L NC p/w palpitations. Pt reports HR of 119 at home. States she felt faint earlier today a/w episode. Denies SOB    Patient was seen as a tele QDOC patient. The patient will be seen and further worked up in the main emergency department and their care will be completed by the main emergency department team along with a thorough physical exam. Receiving team will follow up on labs, analgesia, any clinical imaging, reassess and disposition as clinically indicated, all decisions regarding the progression of care will be made at their discretion.    Scribe Statement: Paola FRYE, attest that this documentation has been prepared under the direction and in the presence of Dr. Newberry. 41y F with pmhx of pulmonary HTN, CHF on 2L NC p/w palpitations. Pt reports HR of 119 at home. States she felt faint earlier today a/w episode. Denies SOB    Patient was seen as a tele QDOC patient. The patient will be seen and further worked up in the main emergency department and their care will be completed by the main emergency department team along with a thorough physical exam. Receiving team will follow up on labs, analgesia, any clinical imaging, reassess and disposition as clinically indicated, all decisions regarding the progression of care will be made at their discretion.    Scribe Statement: I, Paola Mirza, attest that this documentation has been prepared under the direction and in the presence of Dr. Newberry.    The scribe's documentation has been prepared under my direction and personally reviewed by me in its entirety. I confirm that the note above accurately reflects all work, treatment, procedures, and medical decision making performed by me (Dr. Newberry).

## 2021-12-14 NOTE — ED PROVIDER NOTE - NSICDXPASTSURGICALHX_GEN_ALL_CORE_FT
PAST SURGICAL HISTORY:  H/O tubal ligation     History of pacemaker 12/19 - [email or web address] model [employer]gevity 4469    History of tubal ligation     Pacemaker

## 2021-12-14 NOTE — ED PROVIDER NOTE - CLINICAL SUMMARY MEDICAL DECISION MAKING FREE TEXT BOX
40 yo female pt w PMH pulm htn, chf, asthma, pe who presents to ED w complaints of palpitations of chronic onset associated to chest tightness, SOB, dizziness and HA. Patient has been told previously palpitations secondary to pulm htn. Will rule out arrhythmias vs ACS 40 yo female pt w PMH pulm htn, chf, asthma, pe who presents to ED w complaints of palpitations of chronic onset associated to chest tightness, SOB, dizziness and HA. Patient has been told previously palpitations secondary to pulm htn. Will rule out arrhythmias vs ACS.   Historical features, symptoms, and clinical exam not consistent with acute pe as patient without shortness of breath or pain with breathing.   will get iv, cbc, cmp, ekg, vbg, ce, ipratropium and reassess  Will follow up on labs, analgesia, imaging, reassess and disposition as clinically indicated.

## 2021-12-14 NOTE — ED PROVIDER NOTE - NSFOLLOWUPINSTRUCTIONS_ED_ALL_ED_FT
You were seen today for concerns of palpitations    Palpitations  A palpitation is the feeling that your heartbeat is irregular or is faster than normal. It may feel like your heart is fluttering or skipping a beat. Palpitations are usually not a serious problem. They may be caused by many things, including smoking, caffeine, alcohol, stress, and certain medicines. Although most causes of palpitations are not serious, palpitations can be a sign of a serious medical problem. In some cases, you may need further medical evaluation.    Follow these instructions at home:  Pay attention to any changes in your symptoms. Take these actions to help with your condition:    Avoid the following:    Caffeinated coffee, tea, soft drinks, diet pills, and energy drinks.  Chocolate.  Alcohol.    Do not use any tobacco products, such as cigarettes, chewing tobacco, and e-cigarettes. If you need help quitting, ask your health care provider.  Try to reduce your stress and anxiety. Things that can help you relax include:    Yoga.  Meditation.  Physical activity, such as swimming, jogging, or walking.  Biofeedback. This is a method that helps you learn to use your mind to control things in your body, such as your heartbeats.    Get plenty of rest and sleep.  Take over-the-counter and prescription medicines only as told by your health care provider.  Keep all follow-up visits as told by your health care provider. This is important.    Contact a health care provider if:  You continue to have a fast or irregular heartbeat after 24 hours.  Your palpitations occur more often.  Get help right away if:  You have chest pain or shortness of breath.  You have a severe headache.  You feel dizzy or you faint.  This information is not intended to replace advice given to you by your health care provider. Make sure you discuss any questions you have with your health care provider. You were seen today for concerns of palpitations, chest tightness, and shortness of breath. You were evaluated and worked up. No emergent concerns at this moment. Please follow up with your cardiologist and primary care provider for further management.     Palpitations  A palpitation is the feeling that your heartbeat is irregular or is faster than normal. It may feel like your heart is fluttering or skipping a beat. Palpitations are usually not a serious problem. They may be caused by many things, including smoking, caffeine, alcohol, stress, and certain medicines. Although most causes of palpitations are not serious, palpitations can be a sign of a serious medical problem. In some cases, you may need further medical evaluation.    Follow these instructions at home:  Pay attention to any changes in your symptoms. Take these actions to help with your condition:    Avoid the following:    Caffeinated coffee, tea, soft drinks, diet pills, and energy drinks.  Chocolate.  Alcohol.    Do not use any tobacco products, such as cigarettes, chewing tobacco, and e-cigarettes. If you need help quitting, ask your health care provider.  Try to reduce your stress and anxiety. Things that can help you relax include:    Yoga.  Meditation.  Physical activity, such as swimming, jogging, or walking.  Biofeedback. This is a method that helps you learn to use your mind to control things in your body, such as your heartbeats.    Get plenty of rest and sleep.  Take over-the-counter and prescription medicines only as told by your health care provider.  Keep all follow-up visits as told by your health care provider. This is important.    Contact a health care provider if:  You continue to have a fast or irregular heartbeat after 24 hours.  Your palpitations occur more often.  Get help right away if:  You have chest pain or shortness of breath.  You have a severe headache.  You feel dizzy or you faint.  This information is not intended to replace advice given to you by your health care provider. Make sure you discuss any questions you have with your health care provider.

## 2021-12-14 NOTE — ED PROVIDER NOTE - PATIENT PORTAL LINK FT
You can access the FollowMyHealth Patient Portal offered by [facility] by registering at the following website: [email or web address]. By joining [employer]’s FollowMyHealth portal, you will also be able to view your health information using other applications (apps) compatible with our system.

## 2021-12-14 NOTE — ED PROVIDER NOTE - OBJECTIVE STATEMENT
42 yo female pt pmh pulm HTN, CHF, asthma, hx of PE (on xarelto), pacemaker who presents to ED for palpitations today x3. Pt refers feeling irregular tachycardia in the early afternoon today with short duration 5 mins. Associated w chest tightness, SOB, and dizziness. Currently still complaining of chest tightness and HA on frontal area tight and of same severity. @home on 2L NC. Previously has had same episodes of palpitations. told it is associated to her pulm htn. Denies n/v, abd pain, urianry sx

## 2021-12-14 NOTE — ED ADULT NURSE NOTE - OBJECTIVE STATEMENT
Pt 40 y/o female, AxOx3, presents to ED from home complaining of 1 epuisode of tachycardia at home w/ HR in 120's. Endorses feeling dizzy and diaphoretic at this time. PMH of CHF, pacemaker, PE on xarelto, asthma. Pt is well appearing, speaking full sentences without difficulty. Breathing spontaneous and unlabored. Had breathing treatment at home for asthma with mild relief. Upon assessment, abdomen soft and nontender, +strong peripheral pulses, moving all extremities without difficulty, lungs clear. Safety and comfort measures initiated- bed placed in lowest position and side rails raised. Pt oriented to call bell system.

## 2021-12-14 NOTE — ED ADULT NURSE NOTE - NS ED NURSE DISCH DISPOSITION
Ongoing SW/CM Assessment/Plan of Care Note     See SW/CM flowsheets for goals and other objective data.    Patient/Family discharge goal (s):  Goal #1: Psychosocial needs assessed  Goal #2: Extended Care Facility discharge arranged       PT Recommendation:  Recommendation for Discharge: PT: 24 Hour assist, Post acute therapy    OT Recommendation:  Recommendations for Discharge: OT: 24 Hour assist    SLP Recommendation:       Disposition:  Planned Discharge Destination: Rehabilitation/Skilled Care    Progress note:   Per rounding, pt is on room air, becoming medically stable, SW updated [facility] admissions- they are to start insurance prior authorization with pt's [facility], SNF requires more updated PT/OT notes- SW arch paged PT and OT to please work with pt today. Discussed with SNF admissions obtaining [employer] health insurance prior authorization historically takes 24-72 hours (busniess hours).         Discharged

## 2021-12-15 RX ORDER — IPRATROPIUM BROMIDE 0.2 MG/ML
500 SOLUTION, NON-ORAL INHALATION ONCE
Refills: 0 | Status: COMPLETED | OUTPATIENT
Start: 2021-12-15 | End: 2021-12-15

## 2021-12-23 ENCOUNTER — APPOINTMENT (OUTPATIENT)
Dept: PULMONOLOGY | Facility: CLINIC | Age: 41
End: 2021-12-23
Payer: MEDICAID

## 2021-12-23 DIAGNOSIS — R76.8 OTHER SPECIFIED ABNORMAL IMMUNOLOGICAL FINDINGS IN SERUM: ICD-10-CM

## 2021-12-23 PROCEDURE — 99443: CPT

## 2021-12-23 NOTE — DISCUSSION/SUMMARY
[FreeTextEntry1] : -----Assessment and Plan--------41  year-old lady with PULMONARY ARTERIAL HYPERTENSION  S/P PPM  FOR TACHYCARDIA AT [place] ON REMODULIN - S/P LEFT BREAST BIOPSY \par   doing well on remodulin 36  ng  ----- ON XARELTO,  LASIX AND ALDACTONE  -------LOW DOSE PREDNISONE -----is following up with at [facility] for transplant listing-----final decision after the  and psych evaluation\par \par \par 1]   PULM  ART HTN     --------- WHO GROUP I FUNCTIONAL CLASS III ----[ AS PER  DR FREEMAN  WHO DID PULM ANGIO SHE HAS FEATURES OF GROUP I  WITH SOME EVIDENCE OF DISTAL CLOTS] She is on remodulin 36ng -- sildenafil 10 mg TID, ON LASIX  20 mg 5  TIMES A WEEK  AND  ALDACTONE 25 MG  VERY DAY - add kcl 10meq for hypokalemia\par 2) Asthma - . continue prednisone 5mg  ipratropium, added nebulized levalbuterol and budesonide. will start  DUPIXENT  as has been steroid dependent likely contributing to her weight, which is precluding lung transplant. ---FLONASE AND  DYMYSTA NASAL SPRAY-----\par 3) oxygen medically necessary given severe Pulm Htn, anemia and h/o nocturnal desaturation which could worsen Pulm HTN. ADvised oxygen 2 lpm x 24hrs\par 4) -She is  on xarelto---will be on lifelong anticoagulation---.\par 5)   --S/P PPM- follows EP clinic at [place] -\par 6) chronic palpitations- -- NEEDS TO SEE   Dr AMBAR HILTON - heart failure clinic\par 7)  she has features of ERICA- including sleep disturbance, nocturnal desat, and excessive fatigue-  HST was negative for ERICA in 2014 but had desaturation. Will try to repeat HST at next visit\par 8) follows  /[place] [facility] - needs to lose wt to be considered for transplant listing- referred to nutritionist-----\par 9) Anemia: -------  . Will monitor for need for transfusions/iron\par 10) f/u in one month\par 11)  ASTHMA / WHEEZING HISTORY---- singulair for asthma/high IGE- awaiting dupixent start- prednisone 5mg, add flonase and azelastine\par \par \par F/U IN A MONTH \par \par   Patient at this time  will follow  the above mentioned recommendations --and f/u in 1 month---If you have any questions  I can be reached  at #  [phone]. \par \par Vivian Yoon MD, FCCP \par Director, Pulmonary Hypertension Program \par [facility] \par Division of Pulmonary, Critical Care and Sleep Medicine \par  Professor of Medicine \par [employer]\par \par \par CLINTON Harrell\par \par

## 2021-12-23 NOTE — HISTORY OF PRESENT ILLNESS
[Home] : at home, [unfilled] , at the time of the visit. [Medical Office: ([facility])___] : at the medical office located in  [Verbal consent obtained from patient] : the patient, [unfilled] [TextBox_4] : ------Patient is here for follow up of her pulmonary htn. ------------------INITIALLY  REFD  WITH   ECHO [DONE  OUTSIDE ] ELEVATED PASP  DILATED RV AND RA---------  HAS BEEN TOLD IN THE PAST SHE HAS  ASTHMA-----long-standing history of dyspnea on exertion-----------------has baseline tachycardia ----.....No history of fever, chills , rigors, chest pain, or hemoptysis. No h/o significant weight loss in last few months. No history of liver dysfunction , collagen vascular disorder or chronic thromboembolic disease. I would classify her dyspnea as WHO  FUNCTIONAL CLASS III-----------no calf tenderness----------SLEEP STUDY SHOWS AHI 0.6 but T 90  < 35 %-----DIAGNOSED  AS PAH ---------WAS ON ADMAPAS  NOW ON   SQ REMODULIN  [ SINCE MAY 2108]  ---S/P PACEMAKER PLACEMENT AT [place]  AND ON METOPROLOL------\par \par ------cardiac CATH ---- 28/ july / 2014.......PAP -76 , Aortic pressure -84, left ventricle edp 10 MMHG , rt atrium - 13, PAP after NO - 42--- CO 3.19,   PVR 1928.78 ( dsc) 24.12 ( woods) PVR after NO ( 692.51( dsc), 8.66 ( enrique)\par \par CARDIAC  CATH 2108-----\par --CTA CHEST---- july 23/2014--- no evidence of PE. non specific ground glass opacities throughout the lungs....moderate pericardial effusion ---\par --repeat CT chest WITHOUT CONTRAST  12/2014 MOSAIC PATTERN UNCHANGED  FROM BEFORE\par vq scan 6/2015 abnormal- low prob PE\par cxr-6/2015 no acute disease \par ---------CT CHEST-----1. Enlarged main pulmonary artery compatible with pulmonary \par hypertension. -2. Patchy bilateral groundglass are unchanged since 06/20/2015 and are of \par uncertain origin. ------       \par \par ECHO ....July 24/ 2014----right ventricular enlargement with decrease ventricular systolic function...estimated rt ventricle systolic pre equals 113mmhg, ---\par \par ECHO 2018----  EF80, [id number]\par \par SEPT 2104  6MWT- WALKED 301 METERS-------PSG   2014   LOW  T90 BUT NO ERICA\par \par \par \par  june 2015---SAW DR FREEMAN  AT [place]  -cath showed   elevated  pressures but  pa ANGIOgram   should only distal clts  not a candidate for  surgery for CTEPH---RA 4 mmhg, RV 46/5, PA 49/24 mean PA 33, PCWP 13   WITH SUGGESTION OF VASODILATOR  RESPONSE  [ D/W DR LUONG]------AS PER HEATH FREEMAN  SHE HAS GROUP I PAH  WITH DISTAL CLOTS S RECOMMENDED   MEDICAL TREATMENT---PT ON  PROCARDIA  AND  ADMAPAS---DID NOT TOLERATE OPSUMIT IN PAST [[-[ DUE TO LOW BP] --------- -------------------------------.\par \par ALSO ON ANTICOAGULATION-follows coumadin clinic  ------- elevated INR today DENIES BLEEDING/HEMATURIA/MELENA\par \par She reports noncompliance to adempas since 8/1/15 has  basla tachycardia f/u with DR KRISHNAMURTHY  cardilogy ---\par \par ---PFT AUG 2016------FVC 2.44, FEV 1 1.61,   TLC 4.37, mild to moderate obstructive lung defect\par ----jan 2017 6MWD 250 m---\par ---2019  6MWT    ---165 METERS\par \par --------ECHO AUG 2016-----EF 82%-----PASP 26 mmHg\par --CATH 2014 ----MEAN PA 77, LVEDP 10, CO 3.14\par \par ------CT SCAN AUG 2016-------patchy bilateral groundglass unchanged since 6/20/2015-----enlarged PA 3.5 cm-----\par CT SCAN  2018--NO PE, PA 4.0 CM,MOSAIC ATTENUATION\par \par july 2018 s/p hospitalization for syncopal episode at home. She was transferred to [facility] where  a pacemaker was inserted d/t tachycardia and syncope. She was also subsequently started on remodulin SQ by Dr Chow--current rate 12ng. She is still have dyspnea.\par \par ------OCT 2018--------s/p hospitalization at [facility]------S/P PACEMAKER PLACEMENT AT [place]------\par \par november 2019 here for acute visit- PAH w/remodulin @16ng infusing subcutaneously\par she is c/o chronic  palpitations, slightly improved.\par comes in today for cough and wheezing- started zpack and prednisone 2 days ago\par \par \par jan 17 2020- PAH- remodulin @16ng- SQ site - has stopped draining, less painful - completed doxy last week and is now on clindamycin ---\par She is also c/o asthma exacerbation- started prednisone 10mg and using nebs- symptoms somewhat improved from a few days ago\par \par Feb 25, 2020  PAH on Xarelto and Remodulin still at 16ng SQ site. New site looks good without infection. Old site has completely healed. Patient still having intermittent jaw claudication. She has more tachycardia with periods. She has Iron deficiency anemia and has stopped taking her Iron due to constipation. she otherwise has good exercise tolerance. Has oxygen at home and is using it.\par \par JULY 2020-----S/P    LEFT BREAST BIOPSY  -------    PAH ON XARELTO   , REMODULIN 19 NG/KG/MIN ----ON OPSUMIT AND  DIURETICS --  HAS GUM PAIN / \par \par aug 2020 pulm htn on remodulin 19ng no jaw pain, no diarrhea- site clean, kevin\par no further facial swelling - broken tooth right upper gum\par She is c/o worsening DOE_ uses oxygen only at night- off diuretics\par breast mass- s/p biopsy- negative for malignancy\par \par SEPT 2020--pulm htn  REMODULIN TO 21 NG/KG/MIN, using  OXYGEN  at 2 lpm,  ON XARELTO AND DIURETICS----[place] LUNG TRANSPLANT APPOINTMENT   SEPT 21\par Currently on clindamycin for skin infection at old remodulin site\par \par 10/2020- Remodulin now up to 23 NG/KG/MIN, using  OXYGEN  at 2 lpm,  ON XARELTO, now off diuretics. S/p [facility] transplant eval pending further testing.  remodulin site is d/c/i. Otherwise respiratory symptoms are stable. c/o palpitations, weakness and dyspnea worse\par \par 11/2020 telehealth phone visit w/pt, Dr Yoon and isidro NP\par Pt is s/p hospitalization- doing well on remodulin 24ng \par \par 11 24 /2020-  telehealth phone visit w/pt, Dr Yoon and isidro NP ------  Pt is s/p hospitalization- doing well on remodulin 26 ng  ----- ON XARELTO,  LASIX AND ALDACTONE  -------LOW DOSE PREDNISONE \par \par DEC 2020----  doing well on remodulin 30  ng  ----- ON XARELTO,  LASIX AND ALDACTONE  -------LOW DOSE PREDNISONE -----is following up with at [facility] for transplant listing-----final decision after the  and psych evaluation------- otherwise feels much better  \par \par FEB 2021-----WILL INCREASE REMODULIN TO 32 NG/KG/MIN ----------   ON XARELTO,  LASIX AND ALDACTONE  -------LOW DOSE PREDNISONE -----is following up with at [facility] for transplant listing-----final decision after the  and psych evaluation------- otherwise feels much better\par \par APRIL 2021----WILL INCREASE REMODULIN TO 33 NG/KG/MIN -----AND SILDENAFILC-----   ON XARELTO,  LASIX AND ALDACTONE  -------LOW DOSE PREDNISONE -----is following up with at [facility] for transplant listing-----final decision after the  and psych evaluation------- otherwise feels much better\par \par JULY 2021----ON REMODULIN  33 NG/KG/MIN ---Remodulin site appears clean--AND SILDENAFILC-----   ON XARELTO,  LASIX AND ALDACTONE  -------LOW DOSE PREDNISONE -----is following up with at [facility] for transplant listing-----final decision after the  and psych evaluation------- otherwise feels much better---HAS has a skin lesion on the left thigh which needs to be excised but can be done under local anesthesia thanks\par AUGUST------2021--   ON REMODULIN  33 NG/KG/MIN ---Remodulin site appears clean--AND SILDENAFILC-----   ON XARELTO, alternate day LASIX  -------LOW DOSE PREDNISONE -----is following up with at [facility] for transplant listing---------- complaining of URTI\par \par 8/2021 c/o dyspnea. On remodulin 34ng, starting prednisone 10mg and zpack yesterday. On oxygen 2 lpm. Taking diuretics every day----feels tachycardia on exertion\par \par 8/16/2021 pulm htn - doing well  on remodulin 36ng- having jaw pain\par \par 10/8/2021 pulm htn - on remodulin 36 ng with unchanged headaches and jaw pain, sildenafil 10 mg TID, furosemide 20 mg 3X weekly. she reports worsened dry and cough and dyspnea x1 week, comfortable but wheezing on exam. she has been on 10 mg of prednisone, increased from her usual dose of 7.5 mg. using ipratropium at least 4X daily. ----For her asthma I will consider her for for Dupixent\par \par \par 11/2021 Doing well. pulm htn - on remodulin 36 ng (having jaw pain), sildenafil 10 mg TID, furosemide 20 mg recently increased to 5x weekly which helped w/edema but still having abd bloating . She is also having palpitations- has not yet followed up with DR Chavis in cardiology.\par Asthma-occas wheezing- awaiting start of dupixent\par Up to date w/covid vac- declines influenza vac. s/p consult with lung transpalnt team but needs to lower BMI before being considered- pt has been referred to nutritionist but has not scheduled appt\par \par \par 12/2021 Doing well. pulm htn - on remodulin 36 ng (having jaw pain), sildenafil and diuretics. Recent hospitalization for tachycardia- now improved. Awaiting reinstatement of access-a ride so she can come to office for biologic training- will start dupixent for asthma. c/o nasal congestion, afebrile. Covid vac x 2

## 2021-12-27 ENCOUNTER — NON-APPOINTMENT (OUTPATIENT)
Age: 41
End: 2021-12-27

## 2022-02-03 ENCOUNTER — NON-APPOINTMENT (OUTPATIENT)
Age: 42
End: 2022-02-03

## 2022-02-03 ENCOUNTER — APPOINTMENT (OUTPATIENT)
Dept: PULMONOLOGY | Facility: CLINIC | Age: 42
End: 2022-02-03
Payer: MEDICAID

## 2022-02-03 PROCEDURE — 99214 OFFICE O/P EST MOD 30 MIN: CPT | Mod: 95

## 2022-02-03 RX ORDER — GUAIFENESIN 600 MG/1
600 TABLET, EXTENDED RELEASE ORAL
Qty: 60 | Refills: 2 | Status: ACTIVE | COMMUNITY
Start: 2022-02-03 | End: 1900-01-01

## 2022-02-03 NOTE — HISTORY OF PRESENT ILLNESS
[Home] : at home, [unfilled] , at the time of the visit. [Medical Office: ([facility])___] : at the medical office located in  [Verbal consent obtained from patient] : the patient, [unfilled] [TextBox_4] : ------Patient is here for follow up of her pulmonary htn. ------------------INITIALLY  REFD  WITH   ECHO [DONE  OUTSIDE ] ELEVATED PASP  DILATED RV AND RA---------  HAS BEEN TOLD IN THE PAST SHE HAS  ASTHMA-----long-standing history of dyspnea on exertion-----------------has baseline tachycardia ----.....No history of fever, chills , rigors, chest pain, or hemoptysis. No h/o significant weight loss in last few months. No history of liver dysfunction , collagen vascular disorder or chronic thromboembolic disease. I would classify her dyspnea as WHO  FUNCTIONAL CLASS III-----------no calf tenderness----------SLEEP STUDY SHOWS AHI 0.6 but T 90  < 35 %-----DIAGNOSED  AS PAH ---------WAS ON ADMAPAS  NOW ON   SQ REMODULIN  [ SINCE MAY 2108]  ---S/P PACEMAKER PLACEMENT AT [place]  AND ON METOPROLOL-----\par \par \par 2/3/2022 tested positive on home covid test 2/2/2022- developed shortness of breath, nausea and vomitting- son is positive\par She is vaccinated for covid but not boosted\par Afebrile, drinking but not eating well. O2 sat 94% room air rest- 92% room air w/walk\par \par PLAN:\par 1) Pulm htn on remodulin @ 36ng SQ\par 2) will refer for MAB given chronic lung disease, high BMI - certify based on home test-\par 3) maintain hydration- hold diuretics for now- monitor I&O and for any s/s of edema\par 4) has oxygen at home - continue o2 sat monitoring- maintain o2 sat >90%\par 5) prednisone 20mg w/taper, zpack, nebs\par 6) flonase and mucinex for congestion\par 7) zofran for nausea\par 8) check home labs\par 9) f/u TEB MOnday\par 10) advised ER if symptoms worsen\par \par \par \par Thanks for allowing  me to participate  in the care of this patient.  Patient at this time  will follow  the above mentioned recommendations and return back for follow up visit. If you have any questions  I can be reached  at # [phone] (office).\par \par Vivian Yoon MD, [facility] \par Director, Pulmonary Hypertension Program \par [facility] \par Division of Pulmonary, Critical Care and Sleep Medicine \par  Professor of Medicine \par [employer]\par \par PEARL HarrellC\par \par

## 2022-02-05 LAB
ALBUMIN SERPL ELPH-MCNC: 4.1 G/DL
ALP BLD-CCNC: 59 U/L
ALT SERPL-CCNC: 10 U/L
ANION GAP SERPL CALC-SCNC: 14 MMOL/L
AST SERPL-CCNC: 14 U/L
BASOPHILS # BLD AUTO: 0.09 K/UL
BASOPHILS NFR BLD AUTO: 0.9 %
BILIRUB SERPL-MCNC: 0.7 MG/DL
BUN SERPL-MCNC: 7 MG/DL
CALCIUM SERPL-MCNC: 8.7 MG/DL
CHLORIDE SERPL-SCNC: 104 MMOL/L
CO2 SERPL-SCNC: 18 MMOL/L
CREAT SERPL-MCNC: 0.97 MG/DL
CRP SERPL-MCNC: 4 MG/L
DEPRECATED D DIMER PPP IA-ACNC: <150 NG/ML DDU
EOSINOPHIL # BLD AUTO: 0.28 K/UL
EOSINOPHIL NFR BLD AUTO: 2.8 %
FERRITIN SERPL-MCNC: 12 NG/ML
GLUCOSE SERPL-MCNC: 82 MG/DL
HCT VFR BLD CALC: 46.8 %
HGB BLD-MCNC: 14.3 G/DL
IMM GRANULOCYTES NFR BLD AUTO: 0.3 %
LYMPHOCYTES # BLD AUTO: 2.79 K/UL
LYMPHOCYTES NFR BLD AUTO: 27.4 %
MAN DIFF?: NORMAL
MCHC RBC-ENTMCNC: 21.7 PG
MCHC RBC-ENTMCNC: 30.6 GM/DL
MCV RBC AUTO: 71.1 FL
MONOCYTES # BLD AUTO: 0.83 K/UL
MONOCYTES NFR BLD AUTO: 8.2 %
NEUTROPHILS # BLD AUTO: 6.15 K/UL
NEUTROPHILS NFR BLD AUTO: 60.4 %
PLATELET # BLD AUTO: 417 K/UL
POTASSIUM SERPL-SCNC: 4.4 MMOL/L
PROCALCITONIN SERPL-MCNC: 0.03 NG/ML
PROT SERPL-MCNC: 6.9 G/DL
RBC # BLD: 6.58 M/UL
RBC # FLD: 18.8 %
SARS-COV-2 N GENE NPH QL NAA+PROBE: NOT DETECTED
SODIUM SERPL-SCNC: 136 MMOL/L
WBC # FLD AUTO: 10.17 K/UL

## 2022-02-07 ENCOUNTER — APPOINTMENT (OUTPATIENT)
Dept: PULMONOLOGY | Facility: CLINIC | Age: 42
End: 2022-02-07
Payer: MEDICAID

## 2022-02-07 DIAGNOSIS — R05.3 CHRONIC COUGH: ICD-10-CM

## 2022-02-07 PROCEDURE — 99443: CPT

## 2022-02-07 NOTE — REVIEW OF SYSTEMS
[Fever] : no fever [Fatigue] : fatigue [Recent Wt Gain (___ Lbs)] : ~T recent [unfilled] lb weight gain [Chills] : no chills [Poor Appetite] : poor appetite [Dry Eyes] : no dry eyes [Cough] : no cough [Hemoptysis] : no hemoptysis [Sputum] : no sputum [Dyspnea] : dyspnea [SOB on Exertion] : sob on exertion [Chest Discomfort] : no chest discomfort [Orthopnea] : orthopnea [Palpitations] : palpitations [Nasal Discharge] : no nasal discharge [GERD] : gerd [Abdominal Pain] : no abdominal pain [Nausea] : no nausea [Vomiting] : no vomiting [Nocturia] : no nocturia [Arthralgias] : no arthralgias [Myalgias] : no myalgias [Raynaud] : no raynaud [Anemia] : anemia [Headache] : no headache [Focal Weakness] : no focal weakness [Numbness] : no numbness [Depression] : no depression [Anxiety] : no anxiety [Diabetes] : no diabetes

## 2022-02-07 NOTE — HISTORY OF PRESENT ILLNESS
[TextBox_4] : ------Patient is here for follow up of her pulmonary htn. ------------------INITIALLY  REFD  WITH   ECHO [DONE  OUTSIDE ] ELEVATED PASP  DILATED RV AND RA---------  HAS BEEN TOLD IN THE PAST SHE HAS  ASTHMA-----long-standing history of dyspnea on exertion-----------------has baseline tachycardia ----.....No history of fever, chills , rigors, chest pain, or hemoptysis. No h/o significant weight loss in last few months. No history of liver dysfunction , collagen vascular disorder or chronic thromboembolic disease. I would classify her dyspnea as WHO  FUNCTIONAL CLASS III-----------no calf tenderness----------SLEEP STUDY SHOWS AHI 0.6 but T 90  < 35 %-----DIAGNOSED  AS PAH ---------WAS ON ADMAPAS  NOW ON   SQ REMODULIN  [ SINCE MAY 2108]  ---S/P PACEMAKER PLACEMENT AT [place]  AND ON METOPROLOL-----\par \par \par 2/3/2022 tested positive on home covid test 2/2/2022- developed shortness of breath, nausea and vomitting- son is positive\par She is vaccinated for covid but not boosted\par Afebrile, drinking but not eating well. O2 sat 94% room air rest- 92% room air w/walk\par \par 2/7/2022 covid pcr negative - did not get MAD\par still with SOUZA - on prednisone with taper\par \par \par PLAN:\par 1) Pulm htn on remodulin @ 36ng SQ\par 2) prednisone with taper\par 3) has oxygen at home - continue o2 sat monitoring- maintain o2 sat >90%\par 4) awaiting transportation reinstatement for in office appt w/dupixent start for asthma\par 5) flonase and mucinex for congestion\par 6) needs iron for iron deficiency\par 7) f/u next week\par \par \par \par \par Thanks for allowing  me to participate  in the care of this patient.  Patient at this time  will follow  the above mentioned recommendations and return back for follow up visit. If you have any questions  I can be reached  at # [phone] (office).\par \par Vivian Yoon MD, FCCP \par Director, Pulmonary Hypertension Program \par [facility] \par Division of Pulmonary, Critical Care and Sleep Medicine \par  Professor of Medicine \par [employer] [facility] [employer]\par \par CLINTON Harrell\par \par  [Home] : at home, [unfilled] , at the time of the visit. [Medical Office: ([facility])___] : at the medical office located in  [Verbal consent obtained from patient] : the patient, [unfilled]

## 2022-02-14 ENCOUNTER — APPOINTMENT (OUTPATIENT)
Dept: PULMONOLOGY | Facility: CLINIC | Age: 42
End: 2022-02-14
Payer: MEDICAID

## 2022-02-14 PROCEDURE — 99443: CPT

## 2022-02-14 NOTE — REVIEW OF SYSTEMS
[Recent Wt Gain (___ Lbs)] : ~T recent [unfilled] lb weight gain [Fatigue] : fatigue [Poor Appetite] : poor appetite [Dyspnea] : dyspnea [SOB on Exertion] : sob on exertion [Orthopnea] : orthopnea [Palpitations] : palpitations [GERD] : gerd [Anemia] : anemia [Fever] : no fever [Chills] : no chills [Dry Eyes] : no dry eyes [Cough] : no cough [Hemoptysis] : no hemoptysis [Sputum] : no sputum [Chest Discomfort] : no chest discomfort [Nasal Discharge] : no nasal discharge [Abdominal Pain] : no abdominal pain [Nausea] : no nausea [Vomiting] : no vomiting [Nocturia] : no nocturia [Arthralgias] : no arthralgias [Myalgias] : no myalgias [Raynaud] : no raynaud [Headache] : no headache [Focal Weakness] : no focal weakness [Numbness] : no numbness [Depression] : no depression [Anxiety] : no anxiety [Diabetes] : no diabetes

## 2022-02-14 NOTE — HISTORY OF PRESENT ILLNESS
[Home] : at home, [unfilled] , at the time of the visit. [Medical Office: ([facility])___] : at the medical office located in  [Verbal consent obtained from patient] : the patient, [unfilled] [TextBox_4] : ------Patient is here for follow up of her pulmonary htn. ------------------INITIALLY  REFD  WITH   ECHO [DONE  OUTSIDE ] ELEVATED PASP  DILATED RV AND RA---------  HAS BEEN TOLD IN THE PAST SHE HAS  ASTHMA-----long-standing history of dyspnea on exertion-----------------has baseline tachycardia ----.....No history of fever, chills , rigors, chest pain, or hemoptysis. No h/o significant weight loss in last few months. No history of liver dysfunction , collagen vascular disorder or chronic thromboembolic disease. I would classify her dyspnea as WHO  FUNCTIONAL CLASS III-----------no calf tenderness----------SLEEP STUDY SHOWS AHI 0.6 but T 90  < 35 %-----DIAGNOSED  AS PAH ---------WAS ON ADMAPAS  NOW ON   SQ REMODULIN  [ SINCE MAY 2108]  ---S/P PACEMAKER PLACEMENT AT [place]  AND ON METOPROLOL-----\par \par \par 2/3/2022 tested positive on home covid test 2/2/2022- developed shortness of breath, nausea and vomitting- son is positive\par She is vaccinated for covid but not boosted\par Afebrile, drinking but not eating well. O2 sat 94% room air rest- 92% room air w/walk\par \par 2/7/2022 covid pcr negative - did not get MAB\par still with SOUZA - on prednisone with taper\par \par \par FEB 14 2022 ---  FEELS BETTER--ON PREDNISONE 10 MG PO QD----- AWAITING NUCALAL APPROVAL  \par \par PLAN:\par 1) Pulm htn on remodulin @ 36ng SQ\par 2) prednisone  10 MG WITH TAPER er\par 3) has oxygen at home - continue o2 sat monitoring- maintain o2 sat >90%\par 4) awaiting transportation reinstatement for in office appt w/dupixent start for asthma\par 5) flonase and mucinex for congestion\par 6) needs iron for iron deficiency\par 7) f/u next week\par \par \par \par \par Thanks for allowing  me to participate  in the care of this patient.  Patient at this time  will follow  the above mentioned recommendations and return back for follow up visit. If you have any questions  I can be reached  at # [phone] (office).\par \par Vivian Yoon MD, FCCP \par Director, Pulmonary Hypertension Program \par [facility] \par Division of Pulmonary, Critical Care and Sleep Medicine \par  Professor of Medicine \par [employer]\par \par PEARL HarrellC\par \par

## 2022-02-25 ENCOUNTER — APPOINTMENT (OUTPATIENT)
Dept: PULMONOLOGY | Facility: CLINIC | Age: 42
End: 2022-02-25

## 2022-03-02 ENCOUNTER — APPOINTMENT (OUTPATIENT)
Dept: PULMONOLOGY | Facility: CLINIC | Age: 42
End: 2022-03-02

## 2022-04-15 ENCOUNTER — APPOINTMENT (OUTPATIENT)
Dept: PULMONOLOGY | Facility: CLINIC | Age: 42
End: 2022-04-15
Payer: MEDICAID

## 2022-04-15 ENCOUNTER — NON-APPOINTMENT (OUTPATIENT)
Age: 42
End: 2022-04-15

## 2022-04-15 VITALS
HEART RATE: 124 BPM | RESPIRATION RATE: 20 BRPM | DIASTOLIC BLOOD PRESSURE: 83 MMHG | WEIGHT: 219 LBS | TEMPERATURE: 97.3 F | SYSTOLIC BLOOD PRESSURE: 113 MMHG | BODY MASS INDEX: 43 KG/M2 | HEIGHT: 60 IN

## 2022-04-15 VITALS — HEART RATE: 113 BPM

## 2022-04-15 DIAGNOSIS — Z76.82 AWAITING ORGAN TRANSPLANT STATUS: ICD-10-CM

## 2022-04-15 DIAGNOSIS — Z91.09 OTHER ALLERGY STATUS, OTHER THAN TO DRUGS AND BIOLOGICAL SUBSTANCES: ICD-10-CM

## 2022-04-15 LAB — SARS-COV-2 RNA CT RESP QN NAA+PROBE: NEGATIVE

## 2022-04-15 PROCEDURE — 94010 BREATHING CAPACITY TEST: CPT

## 2022-04-15 PROCEDURE — 87635 SARS-COV-2 COVID-19 AMP PRB: CPT

## 2022-04-15 PROCEDURE — 94729 DIFFUSING CAPACITY: CPT

## 2022-04-15 PROCEDURE — 94726 PLETHYSMOGRAPHY LUNG VOLUMES: CPT

## 2022-04-15 PROCEDURE — 99215 OFFICE O/P EST HI 40 MIN: CPT | Mod: 25

## 2022-04-15 PROCEDURE — 94618 PULMONARY STRESS TESTING: CPT

## 2022-04-15 RX ORDER — AZITHROMYCIN 250 MG/1
250 TABLET, FILM COATED ORAL
Qty: 1 | Refills: 0 | Status: DISCONTINUED | COMMUNITY
Start: 2022-02-03 | End: 2022-04-15

## 2022-04-15 RX ORDER — IPRATROPIUM BROMIDE 17 UG/1
17 AEROSOL, METERED RESPIRATORY (INHALATION) 4 TIMES DAILY
Qty: 1 | Refills: 3 | Status: ACTIVE | COMMUNITY
Start: 2017-12-14 | End: 1900-01-01

## 2022-04-15 RX ORDER — FERROUS GLUCONATE 324(37.5)
324 (37.5 FE) TABLET ORAL DAILY
Qty: 30 | Refills: 0 | Status: ACTIVE | COMMUNITY
Start: 2022-04-15 | End: 1900-01-01

## 2022-04-15 RX ORDER — KETOTIFEN FUMARATE 0.25 MG/ML
0.03 SOLUTION OPHTHALMIC
Qty: 1 | Refills: 2 | Status: ACTIVE | COMMUNITY
Start: 2022-04-15 | End: 1900-01-01

## 2022-04-15 RX ORDER — AZITHROMYCIN 250 MG/1
250 TABLET, FILM COATED ORAL
Qty: 1 | Refills: 0 | Status: DISCONTINUED | COMMUNITY
Start: 2021-10-07 | End: 2022-04-15

## 2022-04-15 RX ORDER — PREDNISONE 10 MG/1
10 TABLET ORAL
Qty: 25 | Refills: 1 | Status: DISCONTINUED | COMMUNITY
Start: 2021-08-02 | End: 2022-04-15

## 2022-04-15 RX ORDER — PREDNISONE 10 MG/1
10 TABLET ORAL DAILY
Qty: 30 | Refills: 2 | Status: DISCONTINUED | COMMUNITY
Start: 2020-06-09 | End: 2022-04-15

## 2022-04-15 RX ORDER — FLUTICASONE PROPIONATE 50 UG/1
50 SPRAY, METERED NASAL DAILY
Qty: 1 | Refills: 2 | Status: ACTIVE | COMMUNITY
Start: 2021-12-23 | End: 1900-01-01

## 2022-04-17 ENCOUNTER — NON-APPOINTMENT (OUTPATIENT)
Age: 42
End: 2022-04-17

## 2022-04-18 LAB
ALBUMIN SERPL ELPH-MCNC: 4.3 G/DL
ALP BLD-CCNC: 63 U/L
ALT SERPL-CCNC: 9 U/L
ANION GAP SERPL CALC-SCNC: 15 MMOL/L
AST SERPL-CCNC: 13 U/L
BASOPHILS # BLD AUTO: 0.06 K/UL
BASOPHILS NFR BLD AUTO: 0.6 %
BILIRUB SERPL-MCNC: 0.5 MG/DL
BUN SERPL-MCNC: 13 MG/DL
CALCIUM SERPL-MCNC: 9.3 MG/DL
CHLORIDE SERPL-SCNC: 104 MMOL/L
CO2 SERPL-SCNC: 19 MMOL/L
COVID-19 NUCLEOCAPSID  GAM ANTIBODY INTERPRETATION: NEGATIVE
COVID-19 SPIKE DOMAIN ANTIBODY INTERPRETATION: POSITIVE
CREAT SERPL-MCNC: 1.14 MG/DL
EGFR: 62 ML/MIN/1.73M2
EOSINOPHIL # BLD AUTO: 0.24 K/UL
EOSINOPHIL NFR BLD AUTO: 2.3 %
ESTIMATED AVERAGE GLUCOSE: 117 MG/DL
FERRITIN SERPL-MCNC: 21 NG/ML
GLUCOSE SERPL-MCNC: 78 MG/DL
HBA1C MFR BLD HPLC: 5.7 %
HCT VFR BLD CALC: 49.5 %
HGB BLD-MCNC: 15 G/DL
IMM GRANULOCYTES NFR BLD AUTO: 0.4 %
LYMPHOCYTES # BLD AUTO: 2.19 K/UL
LYMPHOCYTES NFR BLD AUTO: 20.8 %
MAN DIFF?: NORMAL
MCHC RBC-ENTMCNC: 21.7 PG
MCHC RBC-ENTMCNC: 30.3 GM/DL
MCV RBC AUTO: 71.7 FL
MONOCYTES # BLD AUTO: 0.83 K/UL
MONOCYTES NFR BLD AUTO: 7.9 %
NEUTROPHILS # BLD AUTO: 7.19 K/UL
NEUTROPHILS NFR BLD AUTO: 68 %
NT-PROBNP SERPL-MCNC: 393 PG/ML
PLATELET # BLD AUTO: 423 K/UL
POTASSIUM SERPL-SCNC: 4.2 MMOL/L
PROT SERPL-MCNC: 7.3 G/DL
RBC # BLD: 6.9 M/UL
RBC # FLD: 18.8 %
SARS-COV-2 AB SERPL IA-ACNC: >250 U/ML
SARS-COV-2 AB SERPL QL IA: 0.08 INDEX
SODIUM SERPL-SCNC: 138 MMOL/L
TSH SERPL-ACNC: 2.32 UIU/ML
WBC # FLD AUTO: 10.55 K/UL

## 2022-04-19 NOTE — PHYSICAL EXAM
[No Acute Distress] : no acute distress [Normal Oropharynx] : normal oropharynx [Normal Appearance] : normal appearance [No Neck Mass] : no neck mass [Normal Rate/Rhythm] : normal rate/rhythm [No Resp Distress] : no resp distress [Clear to Auscultation Bilaterally] : clear to auscultation bilaterally [No Abnormalities] : no abnormalities [Benign] : benign [Normal Gait] : normal gait [No Clubbing] : no clubbing [No Cyanosis] : no cyanosis [No Edema] : no edema [FROM] : FROM [Normal Color/ Pigmentation] : normal color/ pigmentation [No Focal Deficits] : no focal deficits [Oriented x3] : oriented x3 [Normal Affect] : normal affect [TextBox_54] : loud s2

## 2022-04-19 NOTE — HISTORY OF PRESENT ILLNESS
[TextBox_4] : ------Patient is here for follow up of her pulmonary htn. ------------------INITIALLY  REFD  WITH   ECHO [DONE  OUTSIDE ] ELEVATED PASP  DILATED RV AND RA---------  HAS BEEN TOLD IN THE PAST SHE HAS  ASTHMA-----long-standing history of dyspnea on exertion-----------------has baseline tachycardia ----.....No history of fever, chills , rigors, chest pain, or hemoptysis. No h/o significant weight loss in last few months. No history of liver dysfunction , collagen vascular disorder or chronic thromboembolic disease. I would classify her dyspnea as WHO  FUNCTIONAL CLASS III-----------no calf tenderness----------SLEEP STUDY SHOWS AHI 0.6 but T 90  < 35 %-----DIAGNOSED  AS PAH ---------WAS ON ADMAPAS  NOW ON   SQ REMODULIN  [ SINCE MAY 2108]  ---S/P PACEMAKER PLACEMENT AT [place]  AND ON METOPROLOL-----\par \par \par 2/3/2022 tested positive on home covid test 2/2/2022- developed shortness of breath, nausea and vomitting- son is positive\par She is vaccinated for covid but not boosted\par Afebrile, drinking but not eating well. O2 sat 94% room air rest- 92% room air w/walk\par \par 2/7/2022 covid pcr negative - did not get MAB\par still with SOUZA - on prednisone with taper\par \par 4/2022 6mwt o2 sat 95% to 94% on oxygen 69meters (135m) stopped d/t severe SOB and elevated HR\par \par 4/15/2022  pulm htn - on remodulin 36 ng (having jaw pain sometimes), sildenafil 10 mg qd, furosemide 20 mg 5x weekly which helped w/edema. She is also having palpitations- has not yet followed up with DR Chavis in cardiology.\par Asthma-occas wheezing- awaiting start of dupixent\par Up to date w/covid vac- declines influenza vac. s/p consult with lung transplant team but needs to lower BMI before being considered- pt has been referred to nutritionist but has not scheduled appt

## 2022-04-19 NOTE — DISCUSSION/SUMMARY
[FreeTextEntry1] : ----Assessment and Plan--------41 year-old lady with PULMONARY ARTERIAL HYPERTENSION S/P PPM FOR TACHYCARDIA AT [place] ON REMODULIN - S/P LEFT BREAST BIOPSY \par  doing well on remodulin 36 ng ----- ON XARELTO, LASIX AND ALDACTONE -------LOW DOSE PREDNISONE -----is following up with at [facility] for transplant listing-----final decision after the  and psych evaluation\par \par \par 1] PULM ART HTN --------- WHO GROUP I FUNCTIONAL CLASS III ----[ AS PER DR FREEMAN WHO DID PULM ANGIO SHE HAS FEATURES OF GROUP I WITH SOME EVIDENCE OF DISTAL CLOTS] She is on remodulin 36ng -Increase to 38ng - sildenafil 10mg qd, xarelto, ON LASIX 20 mg 5 TIMES A WEEK AND ALDACTONE 25 MG \par 2) Asthma -.prednisone 7. 5mg then 5mg daily- use  ipratropium and levalbuterol and budesonide. will start DUPIXENT as has been steroid dependent likely contributing to her weight-- She is hesitatnt to start self injections- willl discuss further with Dr Yoon at next visit. Continue FLONASE AND DYMYSTA NASAL SPRAY---\par 3) high BMI which  is precluding lung transplant. ---referred to nutritionist--\par 3) oxygen medically necessary given severe Pulm Htn, anemia and h/o nocturnal desaturation which could worsen Pulm HTN. ADvised oxygen 2 lpm x 24hrs\par 4) -She is on xarelto---will be on lifelong anticoagulation---.\par 5) --S/P PPM- follows EP clinic at [place] -\par 6) chronic palpitations- -- NEEDS TO SEE Dr AMBAR HILTON - heart failure clinic as soon as possible\par 7) she has features of ERICA- including sleep disturbance, nocturnal desat, and excessive fatigue- HST was negative for ERICA in 2014 but had desaturation. Will try to repeat HST at next visit\par 8) follows /[facility] Lung Transplant Team Also consulted with Dr Lou at [facility]- needs to lose wt to be considered for transplant listing---\par 9) Anemia:. Will monitor for need for transfusions/iron\par 10) f/u in one month\par 11) labs drawn in our office today\par \par  Patient at this time will follow the above mentioned recommendations --and f/u in 1 month---If you have any questions I can be reached at # [phone]. \par \par VANDANA Harrell-C\par for\par \par Vivian Yoon MD, [facility] \par Director, Pulmonary Hypertension Program \par [facility] \par Division of Pulmonary, Critical Care and Sleep Medicine \par  Professor of Medicine \par [employer]\par \par \par \par \par . \par \par  \par \par

## 2022-05-09 NOTE — CHART NOTE - NSCHARTNOTEFT_GEN_A_CORE
: Deepa Hastings MD    INDICATION: pulmonary hypertension     PROCEDURE:  [x] LIMITED ECHO  [x] LIMITED CHEST  [ ] LIMITED RETROPERITONEAL  [ ] LIMITED ABDOMINAL  [ ] LIMITED DVT  [ ] NEEDLE GUIDANCE VASCULAR  [ ] NEEDLE GUIDANCE THORACENTESIS  [ ] NEEDLE GUIDANCE PARACENTESIS  [ ] NEEDLE GUIDANCE PERICARDIOCENTESIS  [ ] OTHER    FINDINGS:      Lungs: A line pattern predominantly bilaterally. Scattered B lines at the bases.   Cardiac: Normal LV function. Septum celso, minimal flattening. Thickened free wall of RV, no significant RV dilatation. VTI attempted by MICU fellow but unable to get accurate measures.     INTERPRETATION: Normal aeration pattern. Normal LV function. RV free wall thickening, no significant RV dilatation. No Residual Tumor Seen Histology Text: There were no malignant cells seen in the sections examined.

## 2022-05-31 ENCOUNTER — APPOINTMENT (OUTPATIENT)
Dept: PULMONOLOGY | Facility: CLINIC | Age: 42
End: 2022-05-31
Payer: MEDICAID

## 2022-05-31 VITALS
OXYGEN SATURATION: 95 % | WEIGHT: 220 LBS | BODY MASS INDEX: 43.19 KG/M2 | DIASTOLIC BLOOD PRESSURE: 85 MMHG | HEART RATE: 128 BPM | TEMPERATURE: 97.5 F | SYSTOLIC BLOOD PRESSURE: 124 MMHG | HEIGHT: 60 IN

## 2022-05-31 DIAGNOSIS — H04.123 DRY EYE SYNDROME OF BILATERAL LACRIMAL GLANDS: ICD-10-CM

## 2022-05-31 PROCEDURE — 99215 OFFICE O/P EST HI 40 MIN: CPT

## 2022-05-31 RX ORDER — POLYVINYL ALCOHOL, POVIDONE 500; 600 MG/100ML; MG/100ML
5-6 SOLUTION/ DROPS OPHTHALMIC
Qty: 1 | Refills: 0 | Status: ACTIVE | COMMUNITY
Start: 2022-05-31 | End: 1900-01-01

## 2022-05-31 NOTE — HISTORY OF PRESENT ILLNESS
[TextBox_4] : ------Patient is here for follow up of her pulmonary htn. ------------------INITIALLY  REFD  WITH   ECHO [DONE  OUTSIDE ] ELEVATED PASP  DILATED RV AND RA---------  HAS BEEN TOLD IN THE PAST SHE HAS  ASTHMA-----long-standing history of dyspnea on exertion-----------------has baseline tachycardia ----.....No history of fever, chills , rigors, chest pain, or hemoptysis. No h/o significant weight loss in last few months. No history of liver dysfunction , collagen vascular disorder or chronic thromboembolic disease. I would classify her dyspnea as WHO  FUNCTIONAL CLASS III-----------no calf tenderness----------SLEEP STUDY SHOWS AHI 0.6 but T 90  < 35 %-----DIAGNOSED  AS PAH ---------WAS ON ADMAPAS  NOW ON   SQ REMODULIN  [ SINCE MAY 2108]  ---S/P PACEMAKER PLACEMENT AT [place]  AND ON METOPROLOL-----\par \par \par 2/3/2022 tested positive on home covid test 2/2/2022- developed shortness of breath, nausea and vomitting- son is positive\par She is vaccinated for covid but not boosted\par Afebrile, drinking but not eating well. O2 sat 94% room air rest- 92% room air w/walk\par \par 2/7/2022 covid pcr negative - did not get MAB\par still with SOUZA - on prednisone with taper\par \par 4/2022 6mwt o2 sat 95% to 94% on oxygen 69meters (135m) stopped d/t severe SOB and elevated HR\par \par 4/15/2022  pulm htn - on remodulin 36 ng (having jaw pain sometimes), sildenafil 10 mg qd, furosemide 20 mg 5x weekly which helped w/edema. She is also having palpitations- has not yet followed up with DR Chavis in cardiology.\par Asthma-occas wheezing- awaiting start of dupixent\par Up to date w/covid vac- declines influenza vac. s/p consult with lung transplant team but needs to lower BMI before being considered- pt has been referred to nutritionist but has not scheduled appt\par \par \par 5/31/22- pulm htn - on remodulin 36 ng SQ (having jaw pain sometimes), sildenafil 10 mg qd, and diuretics daily- doing well from pulm perspectives\par Asthma currently under control- declining biologic therapy\par Was following lung  transplant team but currently on hold d/t BMI

## 2022-05-31 NOTE — HISTORY OF PRESENT ILLNESS
[TextBox_4] : ------Patient is here for follow up of her pulmonary htn. ------------------INITIALLY  REFD  WITH   ECHO [DONE  OUTSIDE ] ELEVATED PASP  DILATED RV AND RA---------  HAS BEEN TOLD IN THE PAST SHE HAS  ASTHMA-----long-standing history of dyspnea on exertion-----------------has baseline tachycardia ----.....No history of fever, chills , rigors, chest pain, or hemoptysis. No h/o significant weight loss in last few months. No history of liver dysfunction , collagen vascular disorder or chronic thromboembolic disease. I would classify her dyspnea as WHO  FUNCTIONAL CLASS III-----------no calf tenderness----------SLEEP STUDY SHOWS AHI 0.6 but T 90  < 35 %-----DIAGNOSED  AS PAH ---------WAS ON ADMAPAS  NOW ON   SQ REMODULIN  [ SINCE MAY 2108]  ---S/P PACEMAKER PLACEMENT AT [place]  AND ON METOPROLOL-----\par \par \par 2/3/2022 tested positive on home covid test 2/2/2022- developed shortness of breath, nausea and vomitting- son is positive\par She is vaccinated for covid but not boosted\par Afebrile, drinking but not eating well. O2 sat 94% room air rest- 92% room air w/walk\par \par 2/7/2022 covid pcr negative - did not get MAB\par still with SOUZA - on prednisone with taper\par \par 4/2022 6mwt o2 sat 95% to 94% on oxygen 69meters (135m) stopped d/t severe SOB and elevated HR\par \par 4/15/2022  pulm htn - on remodulin 36 ng (having jaw pain sometimes), sildenafil 10 mg qd, furosemide 20 mg 5x weekly which helped w/edema. She is also having palpitations- has not yet followed up with DR Chavis in cardiology.\par Asthma-occas wheezing- awaiting start of dupixent\par Up to date w/covid vac- declines influenza vac. s/p consult with lung transplant team but needs to lower BMI before being considered- pt has been referred to nutritionist but has not scheduled appt\par \par \par 5/31/22- pulm htn - on remodulin 36 ng SQ (having jaw pain sometimes), sildenafil 10 mg qd, and diuretics daily- doing well from pulm perspectives\par Asthma currently under control- declining biologic therapy\par Was following lung  transplant team but currently on hold d/t BMI

## 2022-05-31 NOTE — DISCUSSION/SUMMARY
[FreeTextEntry1] : ----Assessment and Plan--------41 year-old lady with PULMONARY ARTERIAL HYPERTENSION S/P PPM FOR TACHYCARDIA AT [place] ON REMODULIN - S/P LEFT BREAST BIOPSY \par  doing well on remodulin 38 ng ----- ON XARELTO, LASIX AND ALDACTONE -------LOW DOSE PREDNISONE -----is following up with at [facility] for transplant listing-----final decision after the  and psych evaluation\par \par \par 1] PULM ART HTN --------- WHO GROUP I FUNCTIONAL CLASS III ----[ AS PER DR FREEMAN WHO DID PULM ANGIO SHE HAS FEATURES OF GROUP I WITH SOME EVIDENCE OF DISTAL CLOTS] She is on remodulin 36ng -Increase to 40ng - sildenafil 10mg qd, xarelto, ON LASIX 20 mg 5 TIMES A WEEK AND ALDACTONE 25 MG \par 2) Asthma -. use  ipratropium and levalbuterol and budesonide. will start DUPIXENT as has been steroid dependent likely contributing to her weight-- She is hesitatnt to start self injections-  Continue FLONASE AND DYMYSTA NASAL SPRAY---\par 3) high BMI which  is precluding lung transplant. ---referred to nutritionist-/wt loss program-\par 3) oxygen medically necessary given severe Pulm Htn, anemia and h/o nocturnal desaturation which could worsen Pulm HTN. ADvised oxygen 2 lpm x 24hrs\par 4) -She is on xarelto---will be on lifelong anticoagulation---.\par 5) --S/P PPM- follows EP clinic at [place] -\par 6) chronic palpitations- -- NEEDS TO SEE Dr AMBAR HILTON - heart failure clinic as soon as possible\par 7) she has features of ERICA- including sleep disturbance, nocturnal desat, and excessive fatigue- HST was negative for ERICA in 2014 but had desaturation. Will try to repeat HST at next visit\par 8) follows /[place] Lung Transplant Team Also consulted with Dr Lou at [facility]- needs to lose wt to be considered for transplant listing---\par 9) Anemia:. Will monitor for need for transfusions/iron\par 10) needs repeat ECHO\par 11) will set up for home labs (unable to access in office)\par 12? f/u in july\par  Patient at this time will follow the above mentioned recommendations --and f/u in 1 month---If you have any questions I can be reached at # [phone]. \par \par \par \par Vivian Yoon MD, [facility] \par Director, Pulmonary Hypertension Program \par [facility] \par Division of Pulmonary, Critical Care and Sleep Medicine \par  Professor of Medicine \par [employer]\par \par \par VANDANA Harrell-MINE\par \par \par \par \par \par . \par \par  \par \par

## 2022-05-31 NOTE — DISCUSSION/SUMMARY
[FreeTextEntry1] : ----Assessment and Plan--------41 year-old lady with PULMONARY ARTERIAL HYPERTENSION S/P PPM FOR TACHYCARDIA AT [place] ON REMODULIN - S/P LEFT BREAST BIOPSY \par  doing well on remodulin 38 ng ----- ON XARELTO, LASIX AND ALDACTONE -------LOW DOSE PREDNISONE -----is following up with at [facility] for transplant listing-----final decision after the  and psych evaluation\par \par \par 1] PULM ART HTN --------- WHO GROUP I FUNCTIONAL CLASS III ----[ AS PER DR FREEMAN WHO DID PULM ANGIO SHE HAS FEATURES OF GROUP I WITH SOME EVIDENCE OF DISTAL CLOTS] She is on remodulin 36ng -Increase to 40ng - sildenafil 10mg qd, xarelto, ON LASIX 20 mg 5 TIMES A WEEK AND ALDACTONE 25 MG \par 2) Asthma -. use  ipratropium and levalbuterol and budesonide. will start DUPIXENT as has been steroid dependent likely contributing to her weight-- She is hesitatnt to start self injections-  Continue FLONASE AND DYMYSTA NASAL SPRAY---\par 3) high BMI which  is precluding lung transplant. ---referred to nutritionist-/wt loss program-\par 3) oxygen medically necessary given severe Pulm Htn, anemia and h/o nocturnal desaturation which could worsen Pulm HTN. ADvised oxygen 2 lpm x 24hrs\par 4) -She is on xarelto---will be on lifelong anticoagulation---.\par 5) --S/P PPM- follows EP clinic at [place] -\par 6) chronic palpitations- -- NEEDS TO SEE Dr AMBAR HILTON - heart failure clinic as soon as possible\par 7) she has features of ERICA- including sleep disturbance, nocturnal desat, and excessive fatigue- HST was negative for ERICA in 2014 but had desaturation. Will try to repeat HST at next visit\par 8) follows /[place] [facility] Transplant Team Also consulted with Dr Lou at [facility]- needs to lose wt to be considered for transplant listing---\par 9) Anemia:. Will monitor for need for transfusions/iron\par 10) needs repeat ECHO\par 11) will set up for home labs (unable to access in office)\par 12? f/u in july\par  Patient at this time will follow the above mentioned recommendations --and f/u in 1 month---If you have any questions I can be reached at # [phone]. \par \par \par \par Vivian Yoon MD, [facility] \par Director, Pulmonary Hypertension Program \par [facility] \par Division of Pulmonary, Critical Care and Sleep Medicine \par  Professor of Medicine \par [employer]\par \par \par VANDANA Harrell-MINE\par \par \par \par \par \par . \par \par  \par \par

## 2022-06-02 LAB
ALBUMIN SERPL ELPH-MCNC: 4.1 G/DL
ALP BLD-CCNC: 62 U/L
ALT SERPL-CCNC: 10 U/L
ANION GAP SERPL CALC-SCNC: 14 MMOL/L
AST SERPL-CCNC: 17 U/L
BASOPHILS # BLD AUTO: 0.07 K/UL
BASOPHILS NFR BLD AUTO: 0.7 %
BILIRUB SERPL-MCNC: 0.6 MG/DL
BUN SERPL-MCNC: 15 MG/DL
CALCIUM SERPL-MCNC: 8.7 MG/DL
CHLORIDE SERPL-SCNC: 102 MMOL/L
CO2 SERPL-SCNC: 21 MMOL/L
COVID-19 NUCLEOCAPSID  GAM ANTIBODY INTERPRETATION: NEGATIVE
CREAT SERPL-MCNC: 1.05 MG/DL
EGFR: 68 ML/MIN/1.73M2
EOSINOPHIL # BLD AUTO: 0.2 K/UL
EOSINOPHIL NFR BLD AUTO: 2 %
GLUCOSE SERPL-MCNC: 67 MG/DL
HCT VFR BLD CALC: 47.6 %
HGB BLD-MCNC: 14.3 G/DL
IMM GRANULOCYTES NFR BLD AUTO: 0.4 %
LYMPHOCYTES # BLD AUTO: 2.82 K/UL
LYMPHOCYTES NFR BLD AUTO: 28.1 %
MAN DIFF?: NORMAL
MCHC RBC-ENTMCNC: 21.9 PG
MCHC RBC-ENTMCNC: 30 GM/DL
MCV RBC AUTO: 72.8 FL
MONOCYTES # BLD AUTO: 0.64 K/UL
MONOCYTES NFR BLD AUTO: 6.4 %
NEUTROPHILS # BLD AUTO: 6.26 K/UL
NEUTROPHILS NFR BLD AUTO: 62.4 %
PLATELET # BLD AUTO: 348 K/UL
POTASSIUM SERPL-SCNC: 4 MMOL/L
PROT SERPL-MCNC: 6.7 G/DL
RBC # BLD: 6.54 M/UL
RBC # FLD: 19.5 %
SARS-COV-2 AB SERPL QL IA: 0.07 INDEX
SODIUM SERPL-SCNC: 137 MMOL/L
WBC # FLD AUTO: 10.03 K/UL

## 2022-06-08 ENCOUNTER — RESULT CHARGE (OUTPATIENT)
Age: 42
End: 2022-06-08

## 2022-06-10 ENCOUNTER — APPOINTMENT (OUTPATIENT)
Dept: ELECTROPHYSIOLOGY | Facility: CLINIC | Age: 42
End: 2022-06-10
Payer: MEDICAID

## 2022-06-10 ENCOUNTER — APPOINTMENT (OUTPATIENT)
Dept: CARDIOLOGY | Facility: CLINIC | Age: 42
End: 2022-06-10
Payer: MEDICAID

## 2022-06-10 VITALS
TEMPERATURE: 98.1 F | SYSTOLIC BLOOD PRESSURE: 135 MMHG | OXYGEN SATURATION: 95 % | HEIGHT: 60 IN | HEART RATE: 105 BPM | DIASTOLIC BLOOD PRESSURE: 84 MMHG | BODY MASS INDEX: 42.97 KG/M2

## 2022-06-10 VITALS — BODY MASS INDEX: 42.97 KG/M2 | WEIGHT: 220 LBS

## 2022-06-10 PROCEDURE — 93000 ELECTROCARDIOGRAM COMPLETE: CPT | Mod: 59

## 2022-06-10 PROCEDURE — 93280 PM DEVICE PROGR EVAL DUAL: CPT

## 2022-06-10 PROCEDURE — 99214 OFFICE O/P EST MOD 30 MIN: CPT | Mod: 25

## 2022-06-15 ENCOUNTER — APPOINTMENT (OUTPATIENT)
Dept: CT IMAGING | Facility: IMAGING CENTER | Age: 42
End: 2022-06-15
Payer: MEDICAID

## 2022-06-15 ENCOUNTER — OUTPATIENT (OUTPATIENT)
Dept: OUTPATIENT SERVICES | Facility: HOSPITAL | Age: 42
LOS: 1 days | End: 2022-06-15
Payer: MEDICAID

## 2022-06-15 DIAGNOSIS — Z98.51 TUBAL LIGATION STATUS: Chronic | ICD-10-CM

## 2022-06-15 DIAGNOSIS — Z00.8 ENCOUNTER FOR OTHER GENERAL EXAMINATION: ICD-10-CM

## 2022-06-15 DIAGNOSIS — Z95.0 PRESENCE OF CARDIAC PACEMAKER: Chronic | ICD-10-CM

## 2022-06-15 DIAGNOSIS — I27.20 PULMONARY HYPERTENSION, UNSPECIFIED: ICD-10-CM

## 2022-06-15 PROCEDURE — 71250 CT THORAX DX C-: CPT | Mod: 26

## 2022-06-15 PROCEDURE — 71250 CT THORAX DX C-: CPT

## 2022-06-21 NOTE — HISTORY OF PRESENT ILLNESS
[FreeTextEntry1] : Ms. 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 Referred by Dr. Yoon. Previously followed by Dr. Garcia.\par \par Per patient, HPI began in [id number] when was hospitalized for shortness of breath and was told it was asthma. Started seeing Dr. Yoon in 2014. Since was diagnosed with pulmonary HTN (likely group I and IV) and was gradually started on pulmonary vasodilators. \par \par Her pulmonary HTN workup has included the following: \par 7/14 - CTA - negative for PE; mod pericardial effusion; no lymphadenopathy\par 7/14 - negative for HIV, SCOTT\par 6/15 - low probability V/Q scan but heterogeneous distribution \par 5/18 - anti-DsDNA, anti-SSA\par \par Currently, she is on Remodulin infusion at 38 mcg/kg/min continuous with occasional jaw pain and loose stool . Plans to go up to 39 next month. She was started on sildenafil 20 mg tab- takes 1/2 (10 mg) daily. Prior PE in lungs and is on Xarelto 10 mg, told when she had cardiac cath. Last hospitalization at [facility] 2021 for 3-4 days. \par \par Was last admitted 10/26/20 for worsening dyspnea on exertion, tachycardia. Had CTA which was negative for PE but showed mosaic pattern. Had personally seen her during admission and notably was euvolemic. \par \par She was being evaluated for lung transplant at [facility] and follows there with Dr. Martinez.\par \par Pt states she had recent med changes including increasing furosemide to 20 mg daily from QOD, increased prednisone to 5 mg from 2.5 mg and has continued spirolactone 25 mg daily. \par \par States weight has gone up due to prednisone and went up to 220 lbs from 190 lbs. 1 year ago. States she is being evaluated at [facility] for lung transplant but needs to lose weight. BMI needs to < 35 and is currently > 40. \par She has cut out carbs, potatoes, bread, soda but still has 1 cup of cherry coke per day. Prior 2 liters per day. She follow up with a nutritionist.\par \par She uses continuous oxygen at 2 L (for 2 years) at rest and can go up to 4 liters with activity. Walks room to room without dyspnea. She walks out to do shopping but stops because HR increases. Complains of palpitations. She has 5 steps to elevator at home. sleeps with one pillow with difficulty sleeping at times with menstrual period. Feels bloated in hands but  denies lower extremity edema. \par \par Has occasional dizziness. Had syncope several years ago and had PPM implanted in 2019. No further syncope. No recent dizziness/LH. Device checked today with 11 years of battery remaining  < 1%. Episodes of SVT in log and presenting rhythm sinus tachycardia 110 bpm.  \par \par Tries to adhere to low salt diet and chooses less salty options if eating out. Drinks less than 2 liters of fluid per day. \par \par She had Pfizer vaccine x 2 and did not get booster since had mild reaction with last vaccine. She has not had Covid.

## 2022-06-21 NOTE — CARDIOLOGY SUMMARY
[de-identified] : \par 6/10/22 sinus tachycardia 109, RAD, TAJ, RBBB, TWI [de-identified] : \par 11/2/20 - LVEF 65-70%, LIVDd 2.6, RV enlargement with decreased RV systolic function, mild-mod TR RVSP 34 (likely underestimated), D shaped septum in systole/diastole, mod pulmonic insufficiency, IVC small and collapsed  [de-identified] : \par RHC 7/23/20 RA 4, RV 80/4, PA 80/40/57, PCWP 22, LVEDP 4; CO/CI 4/2.0; PVR 11 (using EDP 13).

## 2022-06-21 NOTE — PHYSICAL EXAM
[Well Nourished] : well nourished [Obese] : obese [Normal Conjunctiva] : normal conjunctiva [No Edema] : no edema [Normal] : alert and oriented, normal memory [de-identified] : JVP 6 cm  [de-identified] : Left chest PPM, tachy 100's [de-identified] : mild right expiratory wheeze on 2 L nasal cannula continuous [de-identified] : softly distended [de-identified] : slow gait, useds wheelchair for long distances

## 2022-06-21 NOTE — ASSESSMENT
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 Currently, appears compensated with B/P 135/84 and  bpm. Ultimately would benefit from lung transplant however due to BMI >35, not currently a candidate. \par \par 1. Pulmonary HTN group I and IV)\par - on continuous remodulin at 38 mcg/kg/min \par - follow up with Dr. Yoon\par - may benefit from Ozempic to assist with weight loss. BMI needs to be below 35 for transplant. Will discuss further with Dr. Yoon\par \par 2. Right  heart failure with normal LVEF\par - appears compensated\par - continue furosemide 20 mg daily with additional 20 mg as needed for weight gain of 2-3 lsb in 2-3 days\par - continue spironolactone 25 mg daily\par - schedule TTE\par - limit salt to less than 2000 mg per day\par - follow up with nutritionist to assist with calorie reduction\par \par 3. Hx of PE and evidence of distal emboli; V/Q scan low probability in 2015 with heterogenous distribution\par - continue Xarelto 10 mg daily\par - no s/s of bleeding\par \par 4. 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

## 2022-06-27 RX ORDER — BUDESONIDE 0.5 MG/2ML
0.5 INHALANT ORAL TWICE DAILY
Qty: 2 | Refills: 6 | Status: ACTIVE | COMMUNITY
Start: 2021-10-07 | End: 1900-01-01

## 2022-06-27 RX ORDER — LEVALBUTEROL HYDROCHLORIDE 0.63 MG/3ML
0.63 SOLUTION RESPIRATORY (INHALATION)
Qty: 120 | Refills: 5 | Status: ACTIVE | COMMUNITY
Start: 2021-10-07 | End: 1900-01-01

## 2022-06-28 ENCOUNTER — NON-APPOINTMENT (OUTPATIENT)
Age: 42
End: 2022-06-28

## 2022-06-30 ENCOUNTER — NON-APPOINTMENT (OUTPATIENT)
Age: 42
End: 2022-06-30

## 2022-07-10 ENCOUNTER — RX RENEWAL (OUTPATIENT)
Age: 42
End: 2022-07-10

## 2022-07-11 ENCOUNTER — APPOINTMENT (OUTPATIENT)
Dept: PULMONOLOGY | Facility: CLINIC | Age: 42
End: 2022-07-11

## 2022-07-11 VITALS
OXYGEN SATURATION: 94 % | HEIGHT: 60 IN | WEIGHT: 220 LBS | HEART RATE: 119 BPM | TEMPERATURE: 97.3 F | SYSTOLIC BLOOD PRESSURE: 113 MMHG | DIASTOLIC BLOOD PRESSURE: 81 MMHG | BODY MASS INDEX: 43.19 KG/M2 | RESPIRATION RATE: 20 BRPM

## 2022-07-11 PROCEDURE — 99215 OFFICE O/P EST HI 40 MIN: CPT | Mod: 25

## 2022-07-11 PROCEDURE — 36415 COLL VENOUS BLD VENIPUNCTURE: CPT

## 2022-07-11 NOTE — HISTORY OF PRESENT ILLNESS
[TextBox_4] : ------Patient is here for follow up of her pulmonary htn. ------------------INITIALLY  REFD  WITH   ECHO [DONE  OUTSIDE ] ELEVATED PASP  DILATED RV AND RA---------  HAS BEEN TOLD IN THE PAST SHE HAS  ASTHMA-----long-standing history of dyspnea on exertion-----------------has baseline tachycardia ----.....No history of fever, chills , rigors, chest pain, or hemoptysis. No h/o significant weight loss in last few months. No history of liver dysfunction , collagen vascular disorder or chronic thromboembolic disease. I would classify her dyspnea as WHO  FUNCTIONAL CLASS III-----------no calf tenderness----------SLEEP STUDY SHOWS AHI 0.6 but T 90  < 35 %-----DIAGNOSED  AS PAH ---------WAS ON ADMAPAS  NOW ON   SQ REMODULIN  [ SINCE MAY 2108]  ---S/P PACEMAKER PLACEMENT AT [place]  AND ON METOPROLOL-----\par \par \par 2/3/2022 tested positive on home covid test 2/2/2022- developed shortness of breath, nausea and vomitting- son is positive\par She is vaccinated for covid but not boosted\par Afebrile, drinking but not eating well. O2 sat 94% room air rest- 92% room air w/walk\par \par 2/7/2022 covid pcr negative - did not get MAB\par still with SOUZA - on prednisone with taper\par \par 4/2022 6mwt o2 sat 95% to 94% on oxygen 69meters (135m) stopped d/t severe SOB and elevated HR\par \par 4/15/2022  pulm htn - on remodulin 36 ng (having jaw pain sometimes), sildenafil 10 mg qd, furosemide 20 mg 5x weekly which helped w/edema. She is also having palpitations- has not yet followed up with DR Chavis in cardiology.\par Asthma-occas wheezing- awaiting start of dupixent\par Up to date w/covid vac- declines influenza vac. s/p consult with lung transplant team but needs to lower BMI before being considered- pt has been referred to nutritionist but has not scheduled appt\par \par \par 5/31/22- pulm htn - on remodulin 36 ng SQ (having jaw pain sometimes), sildenafil 10 mg qd, and diuretics daily- doing well from pulm perspectives\par Asthma currently under control- declining biologic therapy\par Was following lung  transplant team but currently on hold d/t BMI\par \par \par 7/11/22- pulm htn--on remodulin 36 ng SQ (having jaw pain sometimes), sildenafil 10 mg qd and diuretics daily [LASIX WO MG---XARELTO \par was in the hospital at [facility]-- given Lasix --- we will slowly increase her dose of Remodulin\par

## 2022-07-11 NOTE — DISCUSSION/SUMMARY
[FreeTextEntry1] : ----Assessment and Plan--------41 year-old lady with PULMONARY ARTERIAL HYPERTENSION S/P PPM FOR TACHYCARDIA AT [place] ON REMODULIN - S/P LEFT BREAST BIOPSY \par  doing well on remodulin 38 ng ----- ON XARELTO, LASIX AND ALDACTONE -------LOW DOSE PREDNISONE -----is following up with at [facility] for transplant listing-----final decision after the  and psych evaluation\par \par \par 1] PULM ART HTN --------- WHO GROUP I FUNCTIONAL CLASS III ----[ AS PER DR FREEMAN WHO DID PULM ANGIO SHE HAS FEATURES OF GROUP I WITH SOME EVIDENCE OF DISTAL CLOTS] She is on remodulin 36ng -Increase to 40ng - sildenafil 10mg qd, xarelto, ON LASIX 20 mg 5 TIMES A WEEK AND ALDACTONE 25 MG \par 2) Asthma -. use  ipratropium and levalbuterol and budesonide. will start DUPIXENT as has been steroid dependent likely contributing to her weight-- She is hesitatnt to start self injections-  Continue FLONASE AND DYMYSTA NASAL SPRAY---\par 3) high BMI which  is precluding lung transplant. ---referred to nutritionist-/wt loss program-\par 3) oxygen medically necessary given severe Pulm Htn, anemia and h/o nocturnal desaturation which could worsen Pulm HTN. ADvised oxygen 2 lpm x 24hrs\par 4) -She is on xarelto---will be on lifelong anticoagulation---.\par 5) --S/P PPM- follows EP clinic at [place] ----NOW  DR AMBAR HILTON ---- \par 6) chronic palpitations- -- NEEDS TO SEE Dr AMBAR HILTON - heart failure clinic as soon as possible\par 7) she has features of ERICA- including sleep disturbance, nocturnal desat, and excessive fatigue- HST was negative for ERICA in 2014 but had desaturation. Will try to repeat HST at next visit\par 8) follows /[place] Lung Transplant Team Also consulted with Dr Lou at [facility]- needs to lose wt to be considered for transplant listing---\par 9) Anemia:. Will monitor for need for transfusions/iron\par 10) needs repeat ECHO\par 11) will set up for home labs (unable to access in office)\par 12? f/u in AUGUST 2022\par \par \par \par Vivian Yoon MD, FCCP \par Director, Pulmonary Hypertension Program \par [facility] \par Division of Pulmonary, Critical Care and Sleep Medicine \par  Professor of Medicine \par [employer]\par \par \par VANDANA Harrell-C\par \par \par \par \par \par . \par \par  \par \par

## 2022-07-12 LAB
ALBUMIN SERPL ELPH-MCNC: 4.4 G/DL
ALP BLD-CCNC: 65 U/L
ALT SERPL-CCNC: 11 U/L
ANION GAP SERPL CALC-SCNC: 14 MMOL/L
AST SERPL-CCNC: 11 U/L
BASOPHILS # BLD AUTO: 0.09 K/UL
BASOPHILS NFR BLD AUTO: 0.9 %
BILIRUB SERPL-MCNC: 0.9 MG/DL
BUN SERPL-MCNC: 11 MG/DL
CALCIUM SERPL-MCNC: 8.8 MG/DL
CHLORIDE SERPL-SCNC: 105 MMOL/L
CO2 SERPL-SCNC: 18 MMOL/L
CREAT SERPL-MCNC: 1.14 MG/DL
EGFR: 62 ML/MIN/1.73M2
EOSINOPHIL # BLD AUTO: 0.26 K/UL
EOSINOPHIL NFR BLD AUTO: 2.7 %
GLUCOSE SERPL-MCNC: 85 MG/DL
HCT VFR BLD CALC: 48.5 %
HGB BLD-MCNC: 15.2 G/DL
IMM GRANULOCYTES NFR BLD AUTO: 0.2 %
LYMPHOCYTES # BLD AUTO: 1.53 K/UL
LYMPHOCYTES NFR BLD AUTO: 16.1 %
MAN DIFF?: NORMAL
MCHC RBC-ENTMCNC: 22.2 PG
MCHC RBC-ENTMCNC: 31.3 GM/DL
MCV RBC AUTO: 70.7 FL
MONOCYTES # BLD AUTO: 0.47 K/UL
MONOCYTES NFR BLD AUTO: 4.9 %
NEUTROPHILS # BLD AUTO: 7.13 K/UL
NEUTROPHILS NFR BLD AUTO: 75.2 %
NT-PROBNP SERPL-MCNC: 666 PG/ML
PLATELET # BLD AUTO: 370 K/UL
POTASSIUM SERPL-SCNC: 4.5 MMOL/L
PROT SERPL-MCNC: 7.2 G/DL
RBC # BLD: 6.86 M/UL
RBC # FLD: 19.8 %
SODIUM SERPL-SCNC: 137 MMOL/L
WBC # FLD AUTO: 9.5 K/UL

## 2022-07-13 ENCOUNTER — NON-APPOINTMENT (OUTPATIENT)
Age: 42
End: 2022-07-13

## 2022-07-15 ENCOUNTER — NON-APPOINTMENT (OUTPATIENT)
Age: 42
End: 2022-07-15

## 2022-07-22 ENCOUNTER — APPOINTMENT (OUTPATIENT)
Dept: PULMONOLOGY | Facility: CLINIC | Age: 42
End: 2022-07-22

## 2022-07-22 VITALS
OXYGEN SATURATION: 92 % | HEIGHT: 60 IN | HEART RATE: 116 BPM | SYSTOLIC BLOOD PRESSURE: 111 MMHG | BODY MASS INDEX: 42.8 KG/M2 | WEIGHT: 218 LBS | DIASTOLIC BLOOD PRESSURE: 83 MMHG | RESPIRATION RATE: 17 BRPM | TEMPERATURE: 97.7 F

## 2022-07-22 DIAGNOSIS — Z79.01 LONG TERM (CURRENT) USE OF ANTICOAGULANTS: ICD-10-CM

## 2022-07-22 PROCEDURE — 99215 OFFICE O/P EST HI 40 MIN: CPT

## 2022-07-22 NOTE — HISTORY OF PRESENT ILLNESS
[TextBox_4] : ------Patient is here for follow up of her pulmonary htn. ------------------INITIALLY  REFD  WITH   ECHO [DONE  OUTSIDE ] ELEVATED PASP  DILATED RV AND RA---------  HAS BEEN TOLD IN THE PAST SHE HAS  ASTHMA-----long-standing history of dyspnea on exertion-----------------has baseline tachycardia ----.....No history of fever, chills , rigors, chest pain, or hemoptysis. No h/o significant weight loss in last few months. No history of liver dysfunction , collagen vascular disorder or chronic thromboembolic disease. I would classify her dyspnea as WHO  FUNCTIONAL CLASS III-----------no calf tenderness----------SLEEP STUDY SHOWS AHI 0.6 but T 90  < 35 %-----DIAGNOSED  AS PAH ---------WAS ON ADMAPAS  NOW ON   SQ REMODULIN  [ SINCE MAY 2108]  ---S/P PACEMAKER PLACEMENT AT [place]  AND ON METOPROLOL-----\par \par \par 2/3/2022 tested positive on home covid test 2/2/2022- developed shortness of breath, nausea and vomitting- son is positive\par She is vaccinated for covid but not boosted\par Afebrile, drinking but not eating well. O2 sat 94% room air rest- 92% room air w/walk\par \par 2/7/2022 covid pcr negative - did not get MAB\par still with SOUZA - on prednisone with taper\par \par 4/2022 6mwt o2 sat 95% to 94% on oxygen 69meters (135m) stopped d/t severe SOB and elevated HR\par \par 4/15/2022  pulm htn - on remodulin 36 ng (having jaw pain sometimes), sildenafil 10 mg qd, furosemide 20 mg 5x weekly which helped w/edema. She is also having palpitations- has not yet followed up with DR Chavis in cardiology.\par Asthma-occas wheezing- awaiting start of dupixent\par Up to date w/covid vac- declines influenza vac. s/p consult with lung transplant team but needs to lower BMI before being considered- pt has been referred to nutritionist but has not scheduled appt\par \par \par 5/31/22- pulm htn - on remodulin 36 ng SQ (having jaw pain sometimes), sildenafil 10 mg qd, and diuretics daily- doing well from pulm perspectives\par Asthma currently under control- declining biologic therapy\par Was following lung  transplant team but currently on hold d/t BMI\par \par \par 7/11/22- pulm htn--on remodulin 36 ng SQ (having jaw pain sometimes), sildenafil 10 mg qd and diuretics daily [LASIX WO MG---XARELTO \par was in the hospital at [facility]-- given Lasix --- we will slowly increase her dose of Remodulin\par \par 7/22/2022 pulm htn- breathing improved on higher dose of remodulin 40ng, having some diarrhea. Palpitations improved\par

## 2022-07-22 NOTE — DISCUSSION/SUMMARY
[FreeTextEntry1] : ----Assessment and Plan--------41 year-old lady with PULMONARY ARTERIAL HYPERTENSION S/P PPM FOR TACHYCARDIA AT [place] ON REMODULIN - S/P LEFT BREAST BIOPSY \par  doing well on remodulin 38 ng ----- ON XARELTO, LASIX AND ALDACTONE -------LOW DOSE PREDNISONE -----is following up with at [facility] for transplant listing-----final decision after the  and psych evaluation\par \par \par 1] PULM ART HTN --------- WHO GROUP I FUNCTIONAL CLASS III ----[ AS PER DR FREEMAN WHO DID PULM ANGIO SHE HAS FEATURES OF GROUP I WITH SOME EVIDENCE OF DISTAL CLOTS] She is on remodulin 36ng -Increase to 40ng -We will transition her to the new Remunity Pump- await insurance approval and RN teaching from [facility]\par Continue  sildenafil 10mg qd, xarelto, ON LASIX 20 mg 5 TIMES A WEEK AND ALDACTONE 25 MG \par 2) Asthma -. use  ipratropium and levalbuterol and budesonide. will start DUPIXENT as has been steroid dependent likely contributing to her weight-- She is hesitant to start biologic injections-  Continue FLONASE AND DYMYSTA NASAL SPRAY---\par 3) high BMI which  is precluding lung transplant. ---referred to nutritionist-/wt loss program-\par 3) oxygen medically necessary given severe Pulm Htn, anemia and h/o nocturnal desaturation which could worsen Pulm HTN. ADvised oxygen 2 lpm x 24hrs\par 4) -She is on xarelto---will be on lifelong anticoagulation---.\par 5) --S/P PPM- follows EP clinic at [facility] ----NOW  DR AMBAR HILTON ---- \par 6) chronic palpitations- -- NEEDS TO SEE Dr AMBAR HILTON - heart failure clinic as soon as possible\par 7) she has features of ERICA- including sleep disturbance, nocturnal desat, and excessive fatigue- HST was negative for ERICA in 2014 but had desaturation. Will try to repeat HST at next visit\par 8) follows w/[facility] Also consulted with Dr Lou at [facility]- needs to lose wt to be considered for transplant listing---\par 9) Anemia:. Will monitor for need for transfusions/iron\par 10) needs repeat ECHO\par 11)  f/u in 1 month\par \par \par \par Vivian Yoon MD, FCCP \par Director, Pulmonary Hypertension Program \par [facility] \par Division of Pulmonary, Critical Care and Sleep Medicine \par  Professor of Medicine \par [employer]\par \par \par VANDANA Harrell-C\par \par \par \par \par \par . \par \par  \par \par

## 2022-07-27 ENCOUNTER — NON-APPOINTMENT (OUTPATIENT)
Age: 42
End: 2022-07-27

## 2022-08-01 ENCOUNTER — NON-APPOINTMENT (OUTPATIENT)
Age: 42
End: 2022-08-01

## 2022-08-28 ENCOUNTER — INPATIENT (INPATIENT)
Facility: HOSPITAL | Age: 42
LOS: 16 days | Discharge: HOME | DRG: 228 | End: 2022-09-14
Attending: INTERNAL MEDICINE | Admitting: HOSPITALIST
Payer: MEDICAID

## 2022-08-28 VITALS
TEMPERATURE: 98 F | HEART RATE: 115 BPM | WEIGHT: 229.94 LBS | HEIGHT: 65 IN | RESPIRATION RATE: 20 BRPM | DIASTOLIC BLOOD PRESSURE: 91 MMHG | SYSTOLIC BLOOD PRESSURE: 122 MMHG | OXYGEN SATURATION: 100 %

## 2022-08-28 DIAGNOSIS — D63.8 ANEMIA IN OTHER CHRONIC DISEASES CLASSIFIED ELSEWHERE: ICD-10-CM

## 2022-08-28 DIAGNOSIS — Z29.9 ENCOUNTER FOR PROPHYLACTIC MEASURES, UNSPECIFIED: ICD-10-CM

## 2022-08-28 DIAGNOSIS — Z95.0 PRESENCE OF CARDIAC PACEMAKER: Chronic | ICD-10-CM

## 2022-08-28 DIAGNOSIS — R06.02 SHORTNESS OF BREATH: ICD-10-CM

## 2022-08-28 DIAGNOSIS — J45.909 UNSPECIFIED ASTHMA, UNCOMPLICATED: ICD-10-CM

## 2022-08-28 DIAGNOSIS — I26.99 OTHER PULMONARY EMBOLISM WITHOUT ACUTE COR PULMONALE: ICD-10-CM

## 2022-08-28 DIAGNOSIS — I50.810 RIGHT HEART FAILURE, UNSPECIFIED: ICD-10-CM

## 2022-08-28 DIAGNOSIS — R00.2 PALPITATIONS: ICD-10-CM

## 2022-08-28 DIAGNOSIS — I27.0 PRIMARY PULMONARY HYPERTENSION: ICD-10-CM

## 2022-08-28 DIAGNOSIS — Z98.51 TUBAL LIGATION STATUS: Chronic | ICD-10-CM

## 2022-08-28 LAB
ALBUMIN SERPL ELPH-MCNC: 4.1 G/DL — SIGNIFICANT CHANGE UP (ref 3.3–5)
ALBUMIN SERPL ELPH-MCNC: 4.2 G/DL — SIGNIFICANT CHANGE UP (ref 3.3–5)
ALP SERPL-CCNC: 63 U/L — SIGNIFICANT CHANGE UP (ref 40–120)
ALP SERPL-CCNC: 63 U/L — SIGNIFICANT CHANGE UP (ref 40–120)
ALT FLD-CCNC: 247 U/L — HIGH (ref 10–45)
ALT FLD-CCNC: 86 U/L — HIGH (ref 10–45)
ANION GAP SERPL CALC-SCNC: 15 MMOL/L — SIGNIFICANT CHANGE UP (ref 5–17)
ANION GAP SERPL CALC-SCNC: 17 MMOL/L — SIGNIFICANT CHANGE UP (ref 5–17)
ANISOCYTOSIS BLD QL: SLIGHT — SIGNIFICANT CHANGE UP
APTT BLD: 36.7 SEC — HIGH (ref 27.5–35.5)
AST SERPL-CCNC: 244 U/L — HIGH (ref 10–40)
AST SERPL-CCNC: 78 U/L — HIGH (ref 10–40)
BASE EXCESS BLDV CALC-SCNC: -4.4 MMOL/L — LOW (ref -2–3)
BASOPHILS # BLD AUTO: 0 K/UL — SIGNIFICANT CHANGE UP (ref 0–0.2)
BASOPHILS NFR BLD AUTO: 0 % — SIGNIFICANT CHANGE UP (ref 0–2)
BILIRUB SERPL-MCNC: 1.2 MG/DL — SIGNIFICANT CHANGE UP (ref 0.2–1.2)
BILIRUB SERPL-MCNC: 1.4 MG/DL — HIGH (ref 0.2–1.2)
BUN SERPL-MCNC: 18 MG/DL — SIGNIFICANT CHANGE UP (ref 7–23)
BUN SERPL-MCNC: 22 MG/DL — SIGNIFICANT CHANGE UP (ref 7–23)
BURR CELLS BLD QL SMEAR: SLIGHT — SIGNIFICANT CHANGE UP
CA-I SERPL-SCNC: 1.11 MMOL/L — LOW (ref 1.15–1.33)
CALCIUM SERPL-MCNC: 8.5 MG/DL — SIGNIFICANT CHANGE UP (ref 8.4–10.5)
CALCIUM SERPL-MCNC: 8.7 MG/DL — SIGNIFICANT CHANGE UP (ref 8.4–10.5)
CHLORIDE BLDV-SCNC: 101 MMOL/L — SIGNIFICANT CHANGE UP (ref 96–108)
CHLORIDE SERPL-SCNC: 103 MMOL/L — SIGNIFICANT CHANGE UP (ref 96–108)
CHLORIDE SERPL-SCNC: 103 MMOL/L — SIGNIFICANT CHANGE UP (ref 96–108)
CK MB CFR SERPL CALC: 2.5 NG/ML — SIGNIFICANT CHANGE UP (ref 0–3.8)
CK SERPL-CCNC: 82 U/L — SIGNIFICANT CHANGE UP (ref 25–170)
CO2 BLDV-SCNC: 23 MMOL/L — SIGNIFICANT CHANGE UP (ref 22–26)
CO2 SERPL-SCNC: 14 MMOL/L — LOW (ref 22–31)
CO2 SERPL-SCNC: 17 MMOL/L — LOW (ref 22–31)
CREAT SERPL-MCNC: 1.21 MG/DL — SIGNIFICANT CHANGE UP (ref 0.5–1.3)
CREAT SERPL-MCNC: 1.48 MG/DL — HIGH (ref 0.5–1.3)
EGFR: 45 ML/MIN/1.73M2 — LOW
EGFR: 57 ML/MIN/1.73M2 — LOW
ELLIPTOCYTES BLD QL SMEAR: SLIGHT — SIGNIFICANT CHANGE UP
EOSINOPHIL # BLD AUTO: 0.1 K/UL — SIGNIFICANT CHANGE UP (ref 0–0.5)
EOSINOPHIL NFR BLD AUTO: 0.9 % — SIGNIFICANT CHANGE UP (ref 0–6)
GAS PNL BLDA: SIGNIFICANT CHANGE UP
GAS PNL BLDV: 131 MMOL/L — LOW (ref 136–145)
GAS PNL BLDV: SIGNIFICANT CHANGE UP
GIANT PLATELETS BLD QL SMEAR: PRESENT — SIGNIFICANT CHANGE UP
GLUCOSE BLDC GLUCOMTR-MCNC: 115 MG/DL — HIGH (ref 70–99)
GLUCOSE BLDV-MCNC: 94 MG/DL — SIGNIFICANT CHANGE UP (ref 70–99)
GLUCOSE SERPL-MCNC: 126 MG/DL — HIGH (ref 70–99)
GLUCOSE SERPL-MCNC: 96 MG/DL — SIGNIFICANT CHANGE UP (ref 70–99)
HCO3 BLDV-SCNC: 22 MMOL/L — SIGNIFICANT CHANGE UP (ref 22–29)
HCT VFR BLD CALC: 46.7 % — HIGH (ref 34.5–45)
HCT VFR BLD CALC: 46.9 % — HIGH (ref 34.5–45)
HCT VFR BLDA CALC: 48 % — HIGH (ref 34.5–46.5)
HGB BLD CALC-MCNC: 15.9 G/DL — SIGNIFICANT CHANGE UP (ref 11.7–16.1)
HGB BLD-MCNC: 14.9 G/DL — SIGNIFICANT CHANGE UP (ref 11.5–15.5)
HGB BLD-MCNC: 14.9 G/DL — SIGNIFICANT CHANGE UP (ref 11.5–15.5)
INR BLD: 2.29 RATIO — HIGH (ref 0.88–1.16)
LACTATE BLDV-MCNC: 2.7 MMOL/L — HIGH (ref 0.5–2)
LYMPHOCYTES # BLD AUTO: 2.87 K/UL — SIGNIFICANT CHANGE UP (ref 1–3.3)
LYMPHOCYTES # BLD AUTO: 26.5 % — SIGNIFICANT CHANGE UP (ref 13–44)
MAGNESIUM SERPL-MCNC: 1.8 MG/DL — SIGNIFICANT CHANGE UP (ref 1.6–2.6)
MANUAL SMEAR VERIFICATION: SIGNIFICANT CHANGE UP
MCHC RBC-ENTMCNC: 22.2 PG — LOW (ref 27–34)
MCHC RBC-ENTMCNC: 22.4 PG — LOW (ref 27–34)
MCHC RBC-ENTMCNC: 31.8 GM/DL — LOW (ref 32–36)
MCHC RBC-ENTMCNC: 31.9 GM/DL — LOW (ref 32–36)
MCV RBC AUTO: 69.8 FL — LOW (ref 80–100)
MCV RBC AUTO: 70.2 FL — LOW (ref 80–100)
MICROCYTES BLD QL: SLIGHT — SIGNIFICANT CHANGE UP
MONOCYTES # BLD AUTO: 0.92 K/UL — HIGH (ref 0–0.9)
MONOCYTES NFR BLD AUTO: 8.5 % — SIGNIFICANT CHANGE UP (ref 2–14)
MYELOCYTES NFR BLD: 0.9 % — HIGH (ref 0–0)
NEUTROPHILS # BLD AUTO: 6.84 K/UL — SIGNIFICANT CHANGE UP (ref 1.8–7.4)
NEUTROPHILS NFR BLD AUTO: 63.2 % — SIGNIFICANT CHANGE UP (ref 43–77)
NRBC # BLD: 0 /100 WBCS — SIGNIFICANT CHANGE UP (ref 0–0)
NT-PROBNP SERPL-SCNC: 7946 PG/ML — HIGH (ref 0–300)
NT-PROBNP SERPL-SCNC: HIGH PG/ML (ref 0–300)
PCO2 BLDV: 42 MMHG — SIGNIFICANT CHANGE UP (ref 39–42)
PH BLDV: 7.32 — SIGNIFICANT CHANGE UP (ref 7.32–7.43)
PHOSPHATE SERPL-MCNC: 5.2 MG/DL — HIGH (ref 2.5–4.5)
PLAT MORPH BLD: NORMAL — SIGNIFICANT CHANGE UP
PLATELET # BLD AUTO: 410 K/UL — HIGH (ref 150–400)
PLATELET # BLD AUTO: 432 K/UL — HIGH (ref 150–400)
PO2 BLDV: 24 MMHG — LOW (ref 25–45)
POIKILOCYTOSIS BLD QL AUTO: SLIGHT — SIGNIFICANT CHANGE UP
POLYCHROMASIA BLD QL SMEAR: SLIGHT — SIGNIFICANT CHANGE UP
POTASSIUM BLDV-SCNC: 4.5 MMOL/L — SIGNIFICANT CHANGE UP (ref 3.5–5.1)
POTASSIUM SERPL-MCNC: 4.1 MMOL/L — SIGNIFICANT CHANGE UP (ref 3.5–5.3)
POTASSIUM SERPL-MCNC: 4.1 MMOL/L — SIGNIFICANT CHANGE UP (ref 3.5–5.3)
POTASSIUM SERPL-SCNC: 4.1 MMOL/L — SIGNIFICANT CHANGE UP (ref 3.5–5.3)
POTASSIUM SERPL-SCNC: 4.1 MMOL/L — SIGNIFICANT CHANGE UP (ref 3.5–5.3)
PROT SERPL-MCNC: 7.5 G/DL — SIGNIFICANT CHANGE UP (ref 6–8.3)
PROT SERPL-MCNC: 7.5 G/DL — SIGNIFICANT CHANGE UP (ref 6–8.3)
PROTHROM AB SERPL-ACNC: 26.5 SEC — HIGH (ref 10.5–13.4)
RAPID RVP RESULT: SIGNIFICANT CHANGE UP
RBC # BLD: 6.65 M/UL — HIGH (ref 3.8–5.2)
RBC # BLD: 6.72 M/UL — HIGH (ref 3.8–5.2)
RBC # FLD: 18.1 % — HIGH (ref 10.3–14.5)
RBC # FLD: 18.4 % — HIGH (ref 10.3–14.5)
RBC BLD AUTO: ABNORMAL
SAO2 % BLDV: 24.8 % — LOW (ref 67–88)
SARS-COV-2 RNA SPEC QL NAA+PROBE: SIGNIFICANT CHANGE UP
SODIUM SERPL-SCNC: 134 MMOL/L — LOW (ref 135–145)
SODIUM SERPL-SCNC: 135 MMOL/L — SIGNIFICANT CHANGE UP (ref 135–145)
TROPONIN T, HIGH SENSITIVITY RESULT: 15 NG/L — SIGNIFICANT CHANGE UP (ref 0–51)
TROPONIN T, HIGH SENSITIVITY RESULT: 23 NG/L — SIGNIFICANT CHANGE UP (ref 0–51)
WBC # BLD: 10.83 K/UL — HIGH (ref 3.8–10.5)
WBC # BLD: 12.83 K/UL — HIGH (ref 3.8–10.5)
WBC # FLD AUTO: 10.83 K/UL — HIGH (ref 3.8–10.5)
WBC # FLD AUTO: 12.83 K/UL — HIGH (ref 3.8–10.5)

## 2022-08-28 PROCEDURE — 99223 1ST HOSP IP/OBS HIGH 75: CPT

## 2022-08-28 PROCEDURE — 99285 EMERGENCY DEPT VISIT HI MDM: CPT

## 2022-08-28 PROCEDURE — 71045 X-RAY EXAM CHEST 1 VIEW: CPT | Mod: 26

## 2022-08-28 PROCEDURE — 93010 ELECTROCARDIOGRAM REPORT: CPT

## 2022-08-28 RX ORDER — OMEPRAZOLE 10 MG/1
1 CAPSULE, DELAYED RELEASE ORAL
Qty: 0 | Refills: 0 | DISCHARGE

## 2022-08-28 RX ORDER — FAMOTIDINE 10 MG/ML
20 INJECTION INTRAVENOUS ONCE
Refills: 0 | Status: COMPLETED | OUTPATIENT
Start: 2022-08-28 | End: 2022-08-28

## 2022-08-28 RX ORDER — SPIRONOLACTONE 25 MG/1
1 TABLET, FILM COATED ORAL
Qty: 0 | Refills: 0 | DISCHARGE

## 2022-08-28 RX ORDER — ACETAMINOPHEN 500 MG
650 TABLET ORAL EVERY 6 HOURS
Refills: 0 | Status: DISCONTINUED | OUTPATIENT
Start: 2022-08-28 | End: 2022-08-30

## 2022-08-28 RX ORDER — RIVAROXABAN 15 MG-20MG
10 KIT ORAL DAILY
Refills: 0 | Status: DISCONTINUED | OUTPATIENT
Start: 2022-08-28 | End: 2022-08-30

## 2022-08-28 RX ORDER — PANTOPRAZOLE SODIUM 20 MG/1
40 TABLET, DELAYED RELEASE ORAL
Refills: 0 | Status: DISCONTINUED | OUTPATIENT
Start: 2022-08-28 | End: 2022-09-14

## 2022-08-28 RX ORDER — ONDANSETRON 8 MG/1
4 TABLET, FILM COATED ORAL EVERY 8 HOURS
Refills: 0 | Status: DISCONTINUED | OUTPATIENT
Start: 2022-08-28 | End: 2022-09-14

## 2022-08-28 RX ORDER — SODIUM CHLORIDE 9 MG/ML
1000 INJECTION, SOLUTION INTRAVENOUS ONCE
Refills: 0 | Status: COMPLETED | OUTPATIENT
Start: 2022-08-28 | End: 2022-08-28

## 2022-08-28 RX ORDER — IPRATROPIUM BROMIDE 0.2 MG/ML
500 SOLUTION, NON-ORAL INHALATION ONCE
Refills: 0 | Status: COMPLETED | OUTPATIENT
Start: 2022-08-28 | End: 2022-08-28

## 2022-08-28 RX ORDER — METOCLOPRAMIDE HCL 10 MG
10 TABLET ORAL ONCE
Refills: 0 | Status: COMPLETED | OUTPATIENT
Start: 2022-08-28 | End: 2022-08-28

## 2022-08-28 RX ORDER — FUROSEMIDE 40 MG
20 TABLET ORAL ONCE
Refills: 0 | Status: COMPLETED | OUTPATIENT
Start: 2022-08-28 | End: 2022-08-28

## 2022-08-28 RX ORDER — SODIUM CHLORIDE 9 MG/ML
100 INJECTION INTRAMUSCULAR; INTRAVENOUS; SUBCUTANEOUS ONCE
Refills: 0 | Status: COMPLETED | OUTPATIENT
Start: 2022-08-28 | End: 2022-08-28

## 2022-08-28 RX ORDER — TIOTROPIUM BROMIDE 18 UG/1
1 CAPSULE ORAL; RESPIRATORY (INHALATION) DAILY
Refills: 0 | Status: DISCONTINUED | OUTPATIENT
Start: 2022-08-28 | End: 2022-08-30

## 2022-08-28 RX ORDER — IPRATROPIUM BROMIDE 0.2 MG/ML
1 SOLUTION, NON-ORAL INHALATION EVERY 6 HOURS
Refills: 0 | Status: DISCONTINUED | OUTPATIENT
Start: 2022-08-28 | End: 2022-08-28

## 2022-08-28 RX ORDER — RIVAROXABAN 15 MG-20MG
1 KIT ORAL
Qty: 0 | Refills: 0 | DISCHARGE

## 2022-08-28 RX ORDER — RIVAROXABAN 15 MG-20MG
10 KIT ORAL ONCE
Refills: 0 | Status: COMPLETED | OUTPATIENT
Start: 2022-08-28 | End: 2022-08-28

## 2022-08-28 RX ORDER — FUROSEMIDE 40 MG
20 TABLET ORAL
Refills: 0 | Status: DISCONTINUED | OUTPATIENT
Start: 2022-08-28 | End: 2022-08-29

## 2022-08-28 RX ORDER — LANOLIN ALCOHOL/MO/W.PET/CERES
3 CREAM (GRAM) TOPICAL AT BEDTIME
Refills: 0 | Status: DISCONTINUED | OUTPATIENT
Start: 2022-08-28 | End: 2022-09-14

## 2022-08-28 RX ORDER — SPIRONOLACTONE 25 MG/1
25 TABLET, FILM COATED ORAL DAILY
Refills: 0 | Status: DISCONTINUED | OUTPATIENT
Start: 2022-08-28 | End: 2022-09-14

## 2022-08-28 RX ORDER — PREGABALIN 225 MG/1
1000 CAPSULE ORAL DAILY
Refills: 0 | Status: DISCONTINUED | OUTPATIENT
Start: 2022-08-28 | End: 2022-09-14

## 2022-08-28 RX ADMIN — SODIUM CHLORIDE 400 MILLILITER(S): 9 INJECTION INTRAMUSCULAR; INTRAVENOUS; SUBCUTANEOUS at 21:00

## 2022-08-28 RX ADMIN — ONDANSETRON 4 MILLIGRAM(S): 8 TABLET, FILM COATED ORAL at 18:41

## 2022-08-28 RX ADMIN — Medication 500 MICROGRAM(S): at 16:28

## 2022-08-28 RX ADMIN — SODIUM CHLORIDE 1000 MILLILITER(S): 9 INJECTION, SOLUTION INTRAVENOUS at 19:15

## 2022-08-28 RX ADMIN — FAMOTIDINE 20 MILLIGRAM(S): 10 INJECTION INTRAVENOUS at 19:20

## 2022-08-28 RX ADMIN — RIVAROXABAN 10 MILLIGRAM(S): KIT at 12:02

## 2022-08-28 RX ADMIN — Medication 20 MILLIGRAM(S): at 16:28

## 2022-08-28 RX ADMIN — Medication 10 MILLIGRAM(S): at 11:56

## 2022-08-28 NOTE — H&P ADULT - NEGATIVE MUSCULOSKELETAL SYMPTOMS
Continue eye drops per instructions. no arthralgia/no arthritis/no joint swelling/no myalgia/no muscle cramps/no muscle weakness/no stiffness

## 2022-08-28 NOTE — H&P ADULT - CARDIOVASCULAR
details… normal/S1 S2 present/no gallops/no rub/no murmur/no JVD/normal PMI/no pedal edema/tachycardia

## 2022-08-28 NOTE — ED PROVIDER NOTE - PROGRESS NOTE DETAILS
O'[place]ter[place] DO PGY-3: pt has severe nausea and vomiting in ED. Pt's qtc is 507. Pt is tachycardic which should protect aginst ill effects of prolonged qtc. Ordered reglan. Pt states zofran "never works" O'Sterling DO PGY-3: pt requesting home med of ipratropium treatment. Discussed admission w/ pt who agrees O'Sterling DO PGY-3: pt requesting home med of ipratropium treatment. Discussed admission w/ pt who agrees.

## 2022-08-28 NOTE — H&P ADULT - GASTROINTESTINAL
details… normal/soft/nontender/nondistended/normal active bowel sounds/no guarding/no rigidity/no organomegaly

## 2022-08-28 NOTE — ED PROVIDER NOTE - CLINICAL SUMMARY MEDICAL DECISION MAKING FREE TEXT BOX
Impression:  Palpitations and flushed skin, subjective fevers in patient with significant underlying pulmonary pathology.   Plan:  basic labs, pan-culture, cxr, cardiac eval.

## 2022-08-28 NOTE — CONSULT NOTE ADULT - ASSESSMENT
42F PMH PE on xarelto, pHTN, ILD, JULIA, right sided heart failure originally presenting with palpitations with RRT for hypotension and MICU consulted for hypotension.     #Hypotension in the setting of pulmonary HTN and RHF  -would not give further fluids at this time.   -No longer hypotensive  Recommend HF consult in the AM. Patient states she follows with Dr. Paramjit Chavis  -Will require a formal echo on this admission     #Chest Discomfort  -patient reports chest discomfort relieved with sitting forward  -CT with chronic bilateral GG and dilated pulmonary arteries.   -EKG reviewed from 8/28. without diffuse ST segment elevation or other sequelae suggesting pericarditis, no obvious ischemic changes.  -Telemetry reviewed without acute events during RRT although noted to have tachycardia to 130s during RRT.   -Trop 15 -> 23  -If chest discomfort recurs/worsens would consult cardiology overnight  -would also consider device interrogation     #RUQ Discomfort  DDx acute cholecystitis v hepatic congestion  -patient with nausea and RUQ abdominal pain   -add on lipase to last labs  -Would order RUQ US    Not a Candidate for MICU at this time. Please reconsult MICU PRN if questions arise.

## 2022-08-28 NOTE — H&P ADULT - NEGATIVE CARDIOVASCULAR SYMPTOMS
no chest pain/no dyspnea on exertion/no paroxysmal nocturnal dyspnea/no peripheral edema/no claudication

## 2022-08-28 NOTE — CONSULT NOTE ADULT - SUBJECTIVE AND OBJECTIVE BOX
CHIEF COMPLAINT: Hypotension     HPI:    H&P  42F PMH PE on xarelto, pHTN, ILD, JULIA, right sided heart failure, presents to the hospital for 2 days of palpitations. Patient reports she has had similar episodes of palpitations in the past, however workup in the past was unremarkable. Patient also reporting mild orthopnea. Denies chest pain. Denies diaphoresis. Denies recent use of stimulants including caffeine. Patient also reporting severe nausea and vomiting which has resolved with zofran in the ED. ROS otherwise unremarkable.    ED course: VS with tachycardia. CBC with anemia. CMP normal. BNP 7646. Troponin T normal. EKG with sinus tachycardia, CXR without consolidation. CT chest noncon revealed pulmonary artery dilations likely secondary to pulmonary hypertension. Patient now for admission to medicine for further evaluation and treatment.     Additional Collateral obtained by MICU:    Patient is a limited historian. She states she is experiencing RUQ discomfort and nausea requiring zofran. Recent rapid response during which a liter bolus was ordered which was stopped by MICU during consultation after ~100cc infused. She states that she follows with Dr. Yoon and requires remodulin infusion, sildenafil, and diuresis with lasix. She states that her lasix was recently changed to daily from every other day although denies other medication changes.     PAST MEDICAL & SURGICAL HISTORY:  Tachycardia      Anemia      Pulmonary hypertension      Pulmonary embolism      Asthma  On home O2 nightly at 2L via NC      PE (pulmonary thromboembolism)  on Xarelto 10 mg daily      Sinusitis      Corneal disorder      Right heart failure      CAD (coronary artery disease)      H/O pulmonary hypertension      Pulmonary embolism      Asthma      History of tachycardia      On supplemental oxygen by nasal cannula  O2 @ 2L      Pacemaker      Skin mass  left upper thigh mass      History of tubal ligation      Pacemaker      H/O tubal ligation      History of pacemaker  12/19 - [employer] model Ingevity 4471          FAMILY HISTORY:  Family history of diabetes mellitus  in brother    Family history of diabetes mellitus in mother    FH: anemia        SOCIAL HISTORY:  Smoking: [ ] Never Smoked [ ] Former Smoker (__ packs x ___ years) [ ] Current Smoker  (__ packs x ___ years)  Substance Use: [ ] Never Used [ ] Used ____  EtOH Use:  Marital Status: [ ] Single [ ]  [ ]  [ ]   Sexual History:   Occupation:  Recent Travel:  Country of Birth:  Advance Directives:    Allergies    adhesives (Rash)  penicillin (Rash)  penicillin (Unknown)  vancomycin (Rash)  vancomycin (Unknown)    Intolerances    albuterol (Other; Rash)      HOME MEDICATIONS:    REVIEW OF SYSTEMS:  Const no fevers chills sweats  CV chest discomfort positional  Abd RUQ abdominal pain       OBJECTIVE:  ICU Vital Signs Last 24 Hrs  T(C): 36.4 (28 Aug 2022 20:50), Max: 36.5 (28 Aug 2022 16:23)  T(F): 97.6 (28 Aug 2022 20:50), Max: 97.7 (28 Aug 2022 16:23)  HR: 120 (28 Aug 2022 21:40) (112 - 120)  BP: 100/85 (28 Aug 2022 21:40) (89/71 - 122/91)  BP(mean): 95 (28 Aug 2022 15:36) (95 - 95)  ABP: --  ABP(mean): --  RR: 20 (28 Aug 2022 21:40) (20 - 22)  SpO2: 97% (28 Aug 2022 21:40) (94% - 100%)    O2 Parameters below as of 28 Aug 2022 21:40  Patient On (Oxygen Delivery Method): nasal cannula  O2 Flow (L/min): 4            CAPILLARY BLOOD GLUCOSE      POCT Blood Glucose.: 115 mg/dL (28 Aug 2022 19:05)      PHYSICAL EXAM:  General: sitting upright in moderate discomfort.   Respiratory: CTAB  Cardiovascular: RRR  Abdomen: RUQ tenderness to palpation, obese abdomen  Skin: non cyanotic  Neurological:AOx3    LINES:     HOSPITAL MEDICATIONS:  rivaroxaban 10 milliGRAM(s) Oral daily      furosemide    Tablet 20 milliGRAM(s) Oral <User Schedule>  sildenafil (REVATIO) 10 milliGRAM(s) Oral once  spironolactone 25 milliGRAM(s) Oral daily    predniSONE   Tablet 7 milliGRAM(s) Oral daily    tiotropium 18 MICROgram(s) Capsule 1 Capsule(s) Inhalation daily    acetaminophen     Tablet .. 650 milliGRAM(s) Oral every 6 hours PRN  melatonin 3 milliGRAM(s) Oral at bedtime PRN  ondansetron Injectable 4 milliGRAM(s) IV Push every 8 hours PRN    aluminum hydroxide/magnesium hydroxide/simethicone Suspension 30 milliLiter(s) Oral every 4 hours PRN  pantoprazole    Tablet 40 milliGRAM(s) Oral before breakfast        cyanocobalamin 1000 MICROGram(s) Oral daily      artificial  tears Solution 1 Drop(s) Both EYES three times a day PRN        LABS:                        14.9   12.83 )-----------( 410      ( 28 Aug 2022 19:33 )             46.9     Hgb Trend: 14.9<--, 14.9<--  08-28    134<L>  |  103  |  22  ----------------------------<  126<H>  4.1   |  14<L>  |  1.48<H>    Ca    8.5      28 Aug 2022 19:33  Phos  5.2     08-28  Mg     1.8     08-28    TPro  7.5  /  Alb  4.2  /  TBili  1.4<H>  /  DBili  x   /  AST  244<H>  /  ALT  247<H>  /  AlkPhos  63  08-28    Creatinine Trend: 1.48<--, 1.21<--  PT/INR - ( 28 Aug 2022 19:33 )   PT: 26.5 sec;   INR: 2.29 ratio         PTT - ( 28 Aug 2022 19:33 )  PTT:36.7 sec    Arterial Blood Gas:  08-28 @ 19:31  7.37/23/94/13/98.5/-9.7  ABG lactate: --    Venous Blood Gas:  08-28 @ 11:23  7.32/42/24/22/24.8  VBG Lactate: 2.7      MICROBIOLOGY:     RADIOLOGY:  [ ] Reviewed and interpreted by me    EKG:    Zaire Love  Internal Medicine  Pager #[phone]

## 2022-08-28 NOTE — ED PROVIDER NOTE - NSICDXPASTSURGICALHX_GEN_ALL_CORE_FT
PAST SURGICAL HISTORY:  H/O tubal ligation     History of pacemaker 12/19 - [employer] model Ingevity 4469    History of tubal ligation     Pacemaker

## 2022-08-28 NOTE — H&P ADULT - NEGATIVE PSYCHIATRIC SYMPTOMS
no suicidal ideation/no depression/no anxiety/no insomnia/no memory loss/no paranoia/no mood swings/no agitation/no visual hallucinations

## 2022-08-28 NOTE — H&P ADULT - PROBLEM SELECTOR PLAN 6
Patient with a history of anemia secondary to iron deficiency. Patient previously was on PO iron however this was discontinued secondary to GI complaints  - Iron studies with AM labs  - No indications for transfusion at this time

## 2022-08-28 NOTE — H&P ADULT - RESPIRATORY
clear to auscultation bilaterally/no wheezes/no rales/no rhonchi/no respiratory distress/no use of accessory muscles/no subcutaneous emphysema/airway patent/breath sounds equal/good air movement

## 2022-08-28 NOTE — CONSULT NOTE ADULT - ATTENDING COMMENTS
42F with PE on xarelto pulm htn ILD. pt follows with Dr. Yoon and Dr. Paramjit Chavis. Pt is on remodulin. Pt states she has had "hot flashes" accompanied with abdominal pain. She also admits to palpitations which she states has happened before but that's normally when her HR is in the 200s. Pt states pain worse after she had gingerale and she vomited. She denies shortness of breath. She does admit to some chest discomfort that is better with leaning forward. States she has been having pain on the right side of the abdomen that wraps around to the back. MICU consulted for hypotension pt with BP [id number]. Upon chart review appears systolic in the 90-100s.     Gen: Alert NAD  Cardiac: RRR  Resp: BS CTA BL   Ab: + BS, soft with ruq ttp + murphys no CVA tenderness   Neuro: MARTE, sensation intact  radial pulses palpated     While in room BP systolic 100 pt is mentating stating she is urinating normally. Pt has been taking her lasix as prescribed. Pt had been ordered for fluid however her BP back to baseline and due to hx of pulm htn would be judicious with fluid administration. LFTs appear elevated could be secondary to hepatic congestion but due to RUQ pain would obtain lipase and RUQ ultrasound. Would reach out to her specialists for baldo aguero for her care in the AM.

## 2022-08-28 NOTE — ED PROVIDER NOTE - ATTENDING CONTRIBUTION TO CARE
MD Marshall:  patient seen and evaluated with the resident.  I was present for key portions of the History and Physical, and I agree with the Impression and Plan.    Patient is a 41 yo F, c/o palpitations, and sensation of flushed skin, ans subjective fevers.  Onset:  gradually over the last 2d.  Quality:  heart-racing sensation, and   Duration:  2d  Context:  known pulmonary HTN, RHF, on chronic 2L O2 by NC.  On trepostinil infusion for years, PO sildenafil.  PEs on Xarelto  Associated Sx:  No CP, back pain.  No weakness/numbness, no abdominal pain.   VS: wnl.  Physical Exam: adult F, NAD, NCAT, PERRL, EOMI, neck supple, CTA B, RRR, Abd: s/nd/nt, Ext: no edema.  Neuro:  AAOx3, moving all 4 extremities equally, normal gait.     ECG:  sinus tachycardia, normal intervals.  nonspecific TWI throughout  Impression:  Palpitations and flushed skin, subjective fevers in patient with significant underlying pulmonary pathology.   Plan:  basic labs, pan-culture, cxr, cardiac eval. MD Marshall:  patient seen and evaluated with the resident.  I was present for key portions of the History and Physical, and I agree with the Impression and Plan.    Patient is a 41 yo F, c/o palpitations, and sensation of flushed skin, ans subjective fevers.  Onset:  gradually over the last 2d.  Quality:  heart-racing sensation, and   Duration:  2d  Context:  known pulmonary HTN, RHF, on chronic 2L O2 by NC.  On trepostinil infusion for years, PO sildenafil.  PEs on Xarelto  Associated Sx:  No CP, back pain.  No weakness/numbness, no abdominal pain.   VS: wnl.  Physical Exam: adult F, NAD, NCAT, PERRL, EOMI, neck supple, CTA B, RRR, Abd: s/nd/nt, Ext: no edema.  Neuro:  AAOx3, moving all 4 extremities equally, normal gait.     ECG:  sinus tachycardia, normal intervals.  nonspecific TWI throughout  Impression:  Palpitations and flushed skin, subjective fevers in patient with significant underlying pulmonary pathology (pulm HTN, cor pulmonale; on IV prostaglandin + PO sildenafil).    Extremely low threshold to admit.    Plan:  basic labs, pan-culture, cxr, cardiac eval.  minimum admit for tele and inpatient pulm/cards consults.

## 2022-08-28 NOTE — H&P ADULT - NSHPLABSRESULTS_GEN_ALL_CORE
I have personally reviewed the EKG and found sinus tach with no signs of ischemia  I have personally reviewed the CXR and found no pleural effusions or consolidations suggestive of infection. PPM in place.

## 2022-08-28 NOTE — H&P ADULT - ASSESSMENT
42F PMH PE on xarelto, pHTN, ILD, JULIA, right sided heart failure, presents to the hospital for 2 days of palpitations and now admitted for evaluation.

## 2022-08-28 NOTE — CHART NOTE - NSCHARTNOTEFT_GEN_A_CORE
ROXI MARIA    Notified by RN patient had RRT at 7pm due to hypotension and now SBP 89. Seen at bedside in mod resp distress, no c/o cp or dizziness.          Interventions taken     NS 100cc iv bolus x1   called MICU consult, rejected pt due to sbp back to 100's, and suggested: HF consult in am, echo, RUQ abd US.   cardio consult  cont assess and monitor  f/u with am team.                    Carley Rodriguez ANP-BC  Spectralink #[id number]

## 2022-08-28 NOTE — ED ADULT NURSE NOTE - NSICDXPASTSURGICALHX_GEN_ALL_CORE_FT
PAST SURGICAL HISTORY:  H/O tubal ligation     History of pacemaker 12/19 - [employer] model Ingevity 4469    History of tubal ligation     Pacemaker

## 2022-08-28 NOTE — H&P ADULT - PROBLEM SELECTOR PLAN 2
Patient with longstanding history of pulmonary hypertension secondary to ILD. Follows with Dr. Aleksandr Yoon as an outpatient.  - Will continue with home medications, sildenafil and remodulin   - Notification sent to Dr. Yoon Patient with longstanding history of pulmonary hypertension secondary to ILD. Follows with Dr. Aleksandr Yoon as an outpatient.  - Will continue with home medications, sildenafil and Remodulin   - Notification sent to Dr. Yoon

## 2022-08-28 NOTE — H&P ADULT - PROBLEM SELECTOR PLAN 4
Patient with right sided failure secondary to pulmonary hypertension   - Continue with home furosemide 20 mg PO every other day  - Mgmt as per pulmonary hypertension

## 2022-08-28 NOTE — ED ADULT NURSE NOTE - NSIMPLEMENTINTERV_GEN_ALL_ED
Implemented All Fall Risk Interventions:  [place] to call system. Call bell, personal items and telephone within reach. Instruct patient to call for assistance. Room bathroom lighting operational. Non-slip footwear when patient is off stretcher. Physically safe environment: no spills, clutter or unnecessary equipment. Stretcher in lowest position, wheels locked, appropriate side rails in place. Provide visual cue, wrist band, yellow gown, etc. Monitor gait and stability. Monitor for mental status changes and reorient to person, place, and time. Review medications for side effects contributing to fall risk. Reinforce activity limits and safety measures with patient and family.

## 2022-08-28 NOTE — RAPID RESPONSE TEAM SUMMARY - NSSITUATIONBACKGROUNDRRT_GEN_ALL_CORE
42F PMH PE on xarelto, pHTN, ILD, JULIA, right sided heart failure, presents to the hospital for 2 days of palpitations. Patient reports she has had similar episodes of palpitations in the past, however workup in the past was unremarkable. Patient also reporting mild orthopnea. Denies chest pain. Denies diaphoresis. Denies recent use of stimulants including caffeine. Patient also reporting severe nausea and vomiting which has resolved with zofran in the ED. ROS otherwise unremarkable. RRT called this evening for chest pain as well as hypotension. Upon initial evaluation, BP difficult to be obtained due to body habitus. Patient mentating well, A/O x3, endorsing mid-epigastric chest discomfort after episode of recent emesis. Currently on 4L NC, saturating at 98%, no acute respiratory distress, not tachypneic, afebrile on axillary temperature. 12 lead ekg demonstrating significant right ventricular enlargement likely in setting of long standing pulmonary hypertension without evidence of ST changes or ischemia concerning. CBC, CMP, ABG, coags, CK/CKMB/troponins ordered. Previous troponin was 15, unlikely ACS given clinical presentation. Blood pressure on repeat in 100/70's, receiving approximately 50 cc of LR - discontinued in setting of significant pulmonary hypertension. Famotidine 20 mg IV x 1 given. Patient hemodynamically stable, will remain on unit. Primary team to follow up on lab work

## 2022-08-28 NOTE — H&P ADULT - PROBLEM SELECTOR PLAN 1
Patient presenting with palpitations. Reports she previously had similar episode and was admitted without identifiable cause.  - Will continue to monitor on telemetry  - Obtain TSH to rule out thyroid dysfunction  - Troponin T negative indicating no new heart strain  - Patient denies stimulant drug use Patient presenting with palpitations. Reports she previously had similar episode and was admitted without identifiable cause.  - Will continue to monitor on telemetry  - Obtain TSH to rule out thyroid dysfunction  - Troponin T negative indicating no new heart strain  - Patient denies stimulant drug use  - Will follow up with recommendations from Dr. Yoon (primary [place] physician), if not available will obtain pulmonary consultation on Monday

## 2022-08-28 NOTE — ED ADULT NURSE NOTE - OBJECTIVE STATEMENT
42 y/op F PMH pulm HTN on ?, CHF on @L NC chronically, PE on xarelto, PPM, BIB EMS from home for palpitations x 2 days. States mild discomfort when feeling palpitations and endorsing feeling weak as well as "like I am in a sauna." No sick contacts, no fevers, chills. Compliant with all medications. CM applied, EKG done. Denies , n/v/d, fevers, chills, abdominal pain, urinary symptoms, numbness, tingling in upper and lower extremities, HA, blurry vision. Updated on plan of care. 42 y/op F PMH pulm HTN on Remodulin infusion, CHF on @L NC chronically, PE on xarelto, PPM, BIB EMS from home for palpitations x 2 days. States mild discomfort when feeling palpitations and endorsing feeling weak as well as "like I am in a sauna." No sick contacts, no fevers, chills. Compliant with all medications. CM applied, EKG done. Denies , n/v/d, fevers, chills, abdominal pain, urinary symptoms, numbness, tingling in upper and lower extremities, HA, blurry vision. Updated on plan of care.

## 2022-08-28 NOTE — ED PROVIDER NOTE - PHYSICAL EXAMINATION
CONSTITUTIONAL: Well-developed; well-nourished; appears in pain   HEAD: Normocephalic; atraumatic.  EYES: no conjunctival injection. PERRL.   ENT: No nasal discharge; airway clear.  NECK: Supple; non tender.  CARD: S1, S2 normal; no murmurs, gallops, or rubs. +tachycardic   RESP: +mild expiratory wheezing LLL  ABD: soft ntnd, no guarding, no distention, no rigidity.   EXT: Ambulates independently.  No cyanosis or edema.   NEURO: Alert, oriented, grossly unremarkable  PSYCH: Cooperative, appropriate.

## 2022-08-28 NOTE — ED PROVIDER NOTE - OBJECTIVE STATEMENT
43 y/o F w/ pmhx of PE on xarleto, pHTN, CHF on 2L NC, asthma and has ppm p/w 2 days of palpiations, orthopnea, and feeling like "I'm in a sauna". In ED pt c/o severe nausea and vomiting. Denies chest pain but does endorse the palpitations. Denies any pain. 43 y/o F w/ pmhx of PE on xarleto, pHTN, CHF on 2L NC, asthma and has ppm p/w 2 days of palpiations, orthopnea, and feeling like "I'm in a sauna". In ED pt c/o severe nausea and vomiting. Denies chest pain but does endorse the palpitations.

## 2022-08-28 NOTE — H&P ADULT - NSICDXPASTSURGICALHX_GEN_ALL_CORE_FT
PAST SURGICAL HISTORY:  H/O tubal ligation     History of pacemaker 12/19 - [employer] model Ingevity 4469    History of tubal ligation     Pacemaker

## 2022-08-28 NOTE — ED PROVIDER NOTE - NS ED ROS FT
CONSTITUTIONAL - No fever, No diaphoresis, No weight change  SKIN - No rash  HEMATOLOGIC - No abnormal bleeding or bruising  EYES - No eye pain, No blurred vision  ENT - No change in hearing, No sore throat, No neck pain, No rhinorrhea, No ear pain  RESPIRATORY +shortness of breath, +cough  CARDIAC -No chest pain, No palpitations  GI - No abdominal pain, +nausea, +vomiting, No diarrhea, No constipation  - No dysuria, no frequency, no hematuria.   MUSCULOSKELETAL - No joint pain, No swelling, No back pain  NEUROLOGIC - No numbness, No focal weakness, No headache, No dizziness

## 2022-08-29 DIAGNOSIS — Z79.899 OTHER LONG TERM (CURRENT) DRUG THERAPY: ICD-10-CM

## 2022-08-29 DIAGNOSIS — I50.813 ACUTE ON CHRONIC RIGHT HEART FAILURE: ICD-10-CM

## 2022-08-29 DIAGNOSIS — N17.9 ACUTE KIDNEY FAILURE, UNSPECIFIED: ICD-10-CM

## 2022-08-29 LAB
ALBUMIN SERPL ELPH-MCNC: 4.3 G/DL — SIGNIFICANT CHANGE UP (ref 3.3–5)
ALBUMIN SERPL ELPH-MCNC: 4.4 G/DL — SIGNIFICANT CHANGE UP (ref 3.3–5)
ALP SERPL-CCNC: 76 U/L — SIGNIFICANT CHANGE UP (ref 40–120)
ALP SERPL-CCNC: 81 U/L — SIGNIFICANT CHANGE UP (ref 40–120)
ALT FLD-CCNC: 1050 U/L — HIGH (ref 10–45)
ALT FLD-CCNC: 1363 U/L — HIGH (ref 10–45)
ANION GAP SERPL CALC-SCNC: 19 MMOL/L — HIGH (ref 5–17)
ANION GAP SERPL CALC-SCNC: 21 MMOL/L — HIGH (ref 5–17)
APPEARANCE UR: CLEAR — SIGNIFICANT CHANGE UP
AST SERPL-CCNC: 1025 U/L — HIGH (ref 10–40)
AST SERPL-CCNC: 1183 U/L — HIGH (ref 10–40)
BACTERIA # UR AUTO: NEGATIVE — SIGNIFICANT CHANGE UP
BASE EXCESS BLDV CALC-SCNC: -13.5 MMOL/L — LOW (ref -2–3)
BILIRUB SERPL-MCNC: 2 MG/DL — HIGH (ref 0.2–1.2)
BILIRUB SERPL-MCNC: 2.1 MG/DL — HIGH (ref 0.2–1.2)
BILIRUB UR-MCNC: NEGATIVE — SIGNIFICANT CHANGE UP
BUN SERPL-MCNC: 33 MG/DL — HIGH (ref 7–23)
BUN SERPL-MCNC: 38 MG/DL — HIGH (ref 7–23)
CA-I SERPL-SCNC: 1.05 MMOL/L — LOW (ref 1.15–1.33)
CALCIUM SERPL-MCNC: 8.6 MG/DL — SIGNIFICANT CHANGE UP (ref 8.4–10.5)
CALCIUM SERPL-MCNC: 8.9 MG/DL — SIGNIFICANT CHANGE UP (ref 8.4–10.5)
CHLORIDE BLDV-SCNC: 100 MMOL/L — SIGNIFICANT CHANGE UP (ref 96–108)
CHLORIDE SERPL-SCNC: 100 MMOL/L — SIGNIFICANT CHANGE UP (ref 96–108)
CHLORIDE SERPL-SCNC: 98 MMOL/L — SIGNIFICANT CHANGE UP (ref 96–108)
CO2 BLDV-SCNC: 17 MMOL/L — LOW (ref 22–26)
CO2 SERPL-SCNC: 13 MMOL/L — LOW (ref 22–31)
CO2 SERPL-SCNC: 15 MMOL/L — LOW (ref 22–31)
COLOR SPEC: YELLOW — SIGNIFICANT CHANGE UP
CREAT ?TM UR-MCNC: 74 MG/DL — SIGNIFICANT CHANGE UP
CREAT SERPL-MCNC: 2.53 MG/DL — HIGH (ref 0.5–1.3)
CREAT SERPL-MCNC: 3.05 MG/DL — HIGH (ref 0.5–1.3)
DIFF PNL FLD: ABNORMAL
EGFR: 19 ML/MIN/1.73M2 — LOW
EGFR: 24 ML/MIN/1.73M2 — LOW
EPI CELLS # UR: 0 /HPF — SIGNIFICANT CHANGE UP
FERRITIN SERPL-MCNC: 320 NG/ML — HIGH (ref 15–150)
GAS PNL BLDA: SIGNIFICANT CHANGE UP
GAS PNL BLDV: 129 MMOL/L — LOW (ref 136–145)
GAS PNL BLDV: SIGNIFICANT CHANGE UP
GAS PNL BLDV: SIGNIFICANT CHANGE UP
GLUCOSE BLDV-MCNC: 116 MG/DL — HIGH (ref 70–99)
GLUCOSE SERPL-MCNC: 117 MG/DL — HIGH (ref 70–99)
GLUCOSE SERPL-MCNC: 123 MG/DL — HIGH (ref 70–99)
GLUCOSE UR QL: NEGATIVE — SIGNIFICANT CHANGE UP
HCO3 BLDV-SCNC: 16 MMOL/L — LOW (ref 22–29)
HCT VFR BLD CALC: 47.8 % — HIGH (ref 34.5–45)
HCT VFR BLD CALC: 49.2 % — HIGH (ref 34.5–45)
HCT VFR BLDA CALC: 46 % — SIGNIFICANT CHANGE UP (ref 34.5–46.5)
HGB BLD CALC-MCNC: 15.4 G/DL — SIGNIFICANT CHANGE UP (ref 11.7–16.1)
HGB BLD-MCNC: 14.9 G/DL — SIGNIFICANT CHANGE UP (ref 11.5–15.5)
HGB BLD-MCNC: 15.2 G/DL — SIGNIFICANT CHANGE UP (ref 11.5–15.5)
HOROWITZ INDEX BLDV+IHG-RTO: 32 — SIGNIFICANT CHANGE UP
HYALINE CASTS # UR AUTO: 4 /LPF — HIGH (ref 0–2)
IRON SATN MFR SERPL: 12 % — LOW (ref 14–50)
IRON SATN MFR SERPL: 52 UG/DL — SIGNIFICANT CHANGE UP (ref 30–160)
KETONES UR-MCNC: NEGATIVE — SIGNIFICANT CHANGE UP
LACTATE BLDV-MCNC: 4.9 MMOL/L — CRITICAL HIGH (ref 0.5–2)
LEUKOCYTE ESTERASE UR-ACNC: NEGATIVE — SIGNIFICANT CHANGE UP
LIDOCAIN IGE QN: 21 U/L — SIGNIFICANT CHANGE UP (ref 7–60)
MAGNESIUM SERPL-MCNC: 2.1 MG/DL — SIGNIFICANT CHANGE UP (ref 1.6–2.6)
MCHC RBC-ENTMCNC: 22.3 PG — LOW (ref 27–34)
MCHC RBC-ENTMCNC: 22.4 PG — LOW (ref 27–34)
MCHC RBC-ENTMCNC: 30.9 GM/DL — LOW (ref 32–36)
MCHC RBC-ENTMCNC: 31.2 GM/DL — LOW (ref 32–36)
MCV RBC AUTO: 71.7 FL — LOW (ref 80–100)
MCV RBC AUTO: 72.4 FL — LOW (ref 80–100)
NITRITE UR-MCNC: NEGATIVE — SIGNIFICANT CHANGE UP
NRBC # BLD: 0 /100 WBCS — SIGNIFICANT CHANGE UP (ref 0–0)
NRBC # BLD: 0 /100 WBCS — SIGNIFICANT CHANGE UP (ref 0–0)
OTHER CELLS CSF MANUAL: 7.6 ML/DL — LOW (ref 18–22)
PCO2 BLDV: 47 MMHG — HIGH (ref 39–42)
PH BLDV: 7.13 — CRITICAL LOW (ref 7.32–7.43)
PH UR: 5.5 — SIGNIFICANT CHANGE UP (ref 5–8)
PHOSPHATE SERPL-MCNC: 7.3 MG/DL — HIGH (ref 2.5–4.5)
PLATELET # BLD AUTO: 374 K/UL — SIGNIFICANT CHANGE UP (ref 150–400)
PLATELET # BLD AUTO: 423 K/UL — HIGH (ref 150–400)
PO2 BLDV: 31 MMHG — SIGNIFICANT CHANGE UP (ref 25–45)
POTASSIUM BLDV-SCNC: 5.4 MMOL/L — HIGH (ref 3.5–5.1)
POTASSIUM SERPL-MCNC: 5.2 MMOL/L — SIGNIFICANT CHANGE UP (ref 3.5–5.3)
POTASSIUM SERPL-MCNC: 5.7 MMOL/L — HIGH (ref 3.5–5.3)
POTASSIUM SERPL-SCNC: 5.2 MMOL/L — SIGNIFICANT CHANGE UP (ref 3.5–5.3)
POTASSIUM SERPL-SCNC: 5.7 MMOL/L — HIGH (ref 3.5–5.3)
PROT SERPL-MCNC: 7.6 G/DL — SIGNIFICANT CHANGE UP (ref 6–8.3)
PROT SERPL-MCNC: 7.6 G/DL — SIGNIFICANT CHANGE UP (ref 6–8.3)
PROT UR-MCNC: ABNORMAL
RBC # BLD: 6.67 M/UL — HIGH (ref 3.8–5.2)
RBC # BLD: 6.8 M/UL — HIGH (ref 3.8–5.2)
RBC # FLD: 17.9 % — HIGH (ref 10.3–14.5)
RBC # FLD: 18.3 % — HIGH (ref 10.3–14.5)
RBC CASTS # UR COMP ASSIST: 14 /HPF — HIGH (ref 0–4)
SAO2 % BLDV: 35 % — LOW (ref 67–88)
SODIUM SERPL-SCNC: 132 MMOL/L — LOW (ref 135–145)
SODIUM SERPL-SCNC: 134 MMOL/L — LOW (ref 135–145)
SP GR SPEC: 1.01 — SIGNIFICANT CHANGE UP (ref 1.01–1.02)
TIBC SERPL-MCNC: 428 UG/DL — SIGNIFICANT CHANGE UP (ref 220–430)
TSH SERPL-MCNC: 2.94 UIU/ML — SIGNIFICANT CHANGE UP (ref 0.27–4.2)
UIBC SERPL-MCNC: 376 UG/DL — HIGH (ref 110–370)
UROBILINOGEN FLD QL: NEGATIVE — SIGNIFICANT CHANGE UP
WBC # BLD: 13.56 K/UL — HIGH (ref 3.8–10.5)
WBC # BLD: 16.94 K/UL — HIGH (ref 3.8–10.5)
WBC # FLD AUTO: 13.56 K/UL — HIGH (ref 3.8–10.5)
WBC # FLD AUTO: 16.94 K/UL — HIGH (ref 3.8–10.5)
WBC UR QL: 4 /HPF — SIGNIFICANT CHANGE UP (ref 0–5)

## 2022-08-29 PROCEDURE — 99233 SBSQ HOSP IP/OBS HIGH 50: CPT

## 2022-08-29 PROCEDURE — 99291 CRITICAL CARE FIRST HOUR: CPT | Mod: GC,25

## 2022-08-29 PROCEDURE — 93306 TTE W/DOPPLER COMPLETE: CPT | Mod: 26

## 2022-08-29 PROCEDURE — 99291 CRITICAL CARE FIRST HOUR: CPT | Mod: GC

## 2022-08-29 PROCEDURE — 36556 INSERT NON-TUNNEL CV CATH: CPT

## 2022-08-29 RX ORDER — AZTREONAM 2 G
2000 VIAL (EA) INJECTION EVERY 8 HOURS
Refills: 0 | Status: DISCONTINUED | OUTPATIENT
Start: 2022-08-29 | End: 2022-08-29

## 2022-08-29 RX ORDER — AZTREONAM 2 G
2000 VIAL (EA) INJECTION EVERY 12 HOURS
Refills: 0 | Status: DISCONTINUED | OUTPATIENT
Start: 2022-08-29 | End: 2022-09-01

## 2022-08-29 RX ORDER — IPRATROPIUM/ALBUTEROL SULFATE 18-103MCG
3 AEROSOL WITH ADAPTER (GRAM) INHALATION EVERY 6 HOURS
Refills: 0 | Status: DISCONTINUED | OUTPATIENT
Start: 2022-08-29 | End: 2022-08-29

## 2022-08-29 RX ORDER — ACETAMINOPHEN 500 MG
1000 TABLET ORAL ONCE
Refills: 0 | Status: COMPLETED | OUTPATIENT
Start: 2022-08-29 | End: 2022-08-29

## 2022-08-29 RX ORDER — ONDANSETRON 8 MG/1
2 TABLET, FILM COATED ORAL ONCE
Refills: 0 | Status: COMPLETED | OUTPATIENT
Start: 2022-08-29 | End: 2022-08-29

## 2022-08-29 RX ORDER — TREPROSTINIL 48 UG/1
0 INHALANT ORAL
Qty: 0 | Refills: 0 | DISCHARGE

## 2022-08-29 RX ORDER — CHLORHEXIDINE GLUCONATE 213 G/1000ML
1 SOLUTION TOPICAL
Refills: 0 | Status: DISCONTINUED | OUTPATIENT
Start: 2022-08-29 | End: 2022-09-14

## 2022-08-29 RX ORDER — IPRATROPIUM BROMIDE 0.2 MG/ML
2 SOLUTION, NON-ORAL INHALATION
Qty: 0 | Refills: 0 | DISCHARGE

## 2022-08-29 RX ORDER — DOBUTAMINE HCL 250MG/20ML
2.5 VIAL (ML) INTRAVENOUS
Qty: 500 | Refills: 0 | Status: DISCONTINUED | OUTPATIENT
Start: 2022-08-29 | End: 2022-09-06

## 2022-08-29 RX ORDER — IPRATROPIUM BROMIDE 0.2 MG/ML
2.5 SOLUTION, NON-ORAL INHALATION
Qty: 0 | Refills: 0 | DISCHARGE

## 2022-08-29 RX ORDER — IPRATROPIUM BROMIDE 0.2 MG/ML
500 SOLUTION, NON-ORAL INHALATION EVERY 6 HOURS
Refills: 0 | Status: DISCONTINUED | OUTPATIENT
Start: 2022-08-29 | End: 2022-09-01

## 2022-08-29 RX ORDER — IPRATROPIUM BROMIDE 0.2 MG/ML
500 SOLUTION, NON-ORAL INHALATION ONCE
Refills: 0 | Status: COMPLETED | OUTPATIENT
Start: 2022-08-29 | End: 2022-08-29

## 2022-08-29 RX ORDER — FUROSEMIDE 40 MG
60 TABLET ORAL ONCE
Refills: 0 | Status: DISCONTINUED | OUTPATIENT
Start: 2022-08-29 | End: 2022-08-29

## 2022-08-29 RX ORDER — SODIUM CHLORIDE 9 MG/ML
10 INJECTION INTRAMUSCULAR; INTRAVENOUS; SUBCUTANEOUS
Refills: 0 | Status: DISCONTINUED | OUTPATIENT
Start: 2022-08-29 | End: 2022-09-14

## 2022-08-29 RX ORDER — IPRATROPIUM BROMIDE 0.2 MG/ML
1 SOLUTION, NON-ORAL INHALATION EVERY 6 HOURS
Refills: 0 | Status: DISCONTINUED | OUTPATIENT
Start: 2022-08-29 | End: 2022-08-29

## 2022-08-29 RX ORDER — CHLORHEXIDINE GLUCONATE 213 G/1000ML
1 SOLUTION TOPICAL
Refills: 0 | Status: DISCONTINUED | OUTPATIENT
Start: 2022-08-29 | End: 2022-08-30

## 2022-08-29 RX ORDER — FUROSEMIDE 40 MG
60 TABLET ORAL ONCE
Refills: 0 | Status: COMPLETED | OUTPATIENT
Start: 2022-08-29 | End: 2022-08-29

## 2022-08-29 RX ADMIN — RIVAROXABAN 10 MILLIGRAM(S): KIT at 14:57

## 2022-08-29 RX ADMIN — Medication 60 MILLIGRAM(S): at 21:00

## 2022-08-29 RX ADMIN — CHLORHEXIDINE GLUCONATE 1 APPLICATION(S): 213 SOLUTION TOPICAL at 14:00

## 2022-08-29 RX ADMIN — SPIRONOLACTONE 25 MILLIGRAM(S): 25 TABLET, FILM COATED ORAL at 05:46

## 2022-08-29 RX ADMIN — Medication 1000 MILLIGRAM(S): at 23:25

## 2022-08-29 RX ADMIN — Medication 500 MICROGRAM(S): at 10:45

## 2022-08-29 RX ADMIN — ONDANSETRON 4 MILLIGRAM(S): 8 TABLET, FILM COATED ORAL at 05:54

## 2022-08-29 RX ADMIN — ONDANSETRON 2 MILLIGRAM(S): 8 TABLET, FILM COATED ORAL at 23:20

## 2022-08-29 RX ADMIN — Medication 20 MILLIGRAM(S): at 05:46

## 2022-08-29 RX ADMIN — Medication 650 MILLIGRAM(S): at 04:36

## 2022-08-29 RX ADMIN — CHLORHEXIDINE GLUCONATE 1 APPLICATION(S): 213 SOLUTION TOPICAL at 21:01

## 2022-08-29 RX ADMIN — Medication 100 MILLIGRAM(S): at 21:43

## 2022-08-29 RX ADMIN — Medication 7 MILLIGRAM(S): at 05:46

## 2022-08-29 RX ADMIN — PREGABALIN 1000 MICROGRAM(S): 225 CAPSULE ORAL at 14:44

## 2022-08-29 RX ADMIN — Medication 7.82 MICROGRAM(S)/KG/MIN: at 21:43

## 2022-08-29 RX ADMIN — Medication 400 MILLIGRAM(S): at 22:51

## 2022-08-29 RX ADMIN — Medication 650 MILLIGRAM(S): at 03:27

## 2022-08-29 RX ADMIN — PANTOPRAZOLE SODIUM 40 MILLIGRAM(S): 20 TABLET, DELAYED RELEASE ORAL at 05:46

## 2022-08-29 RX ADMIN — ONDANSETRON 4 MILLIGRAM(S): 8 TABLET, FILM COATED ORAL at 14:45

## 2022-08-29 NOTE — PHARMACOTHERAPY INTERVENTION NOTE - COMMENTS
Performed medication reconciliation and home medication list updated in outpatient medication review. Medications verified with patient. Patient sees Dr. Yoon as her pulmonologist outpatient.     Home Medications:  Atrovent 500 mcg/2.5 mL inhalation solution: 2.5 milliliter(s) inhaled , As Needed - Pt uses neb and HFA, however says neb work better for her  Atrovent HFA 17 mcg/inh inhalation aerosol: puff(s) inhaled , As Needed  Lasix 20 mg oral tablet: 1 tab(s) orally every other day MWF  omeprazole 40 mg oral delayed release capsule: 1 cap(s) orally once a day  predniSONE: 10 milligram(s) orally once a day  Remodulin 5 mg/mL injectable solution: patient is taking 42ng/kg/min via her own SQ PUMP (0.046 mL/hr)  sildenafil 20 mg oral tablet: 0.5 tab(s) orally once a day   Vitamin B12 1000 mcg oral tablet: 1 tab(s) orally once a day  Xarelto 10 mg oral tablet: 1 tab(s) orally once a day     Please refer to specifics in home medication list (outpatient medication review).    Recommendations:  1) Discussed with attending, will order Remodulin (treprostinil) SQ pump as pt's own med; pump is running continuous. Remodulin 5mg/mL pump is running at 0.046 mL/hr fixed dose (42 nG/mL/min). Patient has been on this dose since end of July and tolerating this dose. Pt says each med lasts her ~2 days and is due to change her pump tmr. Pt knows how to use/ change her pump. She says she may obtain more med from [facility] specialty pharmacy and have  bring it in from home if needed.   2) Pt is on prednisone 10mg daily at home, ordered for 7mg here. Recommend to reconcile 10mg daily.   3) Atrovent neb ordered for patient. Pt on 2L NC at home.     Elvie Meadows, PharmD, [facility]  Clinical Pharmacy Specialist  [phone] or Teams  Performed medication reconciliation and home medication list updated in outpatient medication review. Medications verified with patient. Patient sees Dr. Yoon as her pulmonologist outpatient.     Home Medications:  Atrovent 500 mcg/2.5 mL inhalation solution: 2.5 milliliter(s) inhaled , As Needed - Pt uses neb and HFA, however says neb work better for her  Atrovent HFA 17 mcg/inh inhalation aerosol: puff(s) inhaled , As Needed  Lasix 20 mg oral tablet: 1 tab(s) orally every other day MWF  omeprazole 40 mg oral delayed release capsule: 1 cap(s) orally once a day  predniSONE: 10 milligram(s) orally once a day  Remodulin 5 mg/mL injectable solution: patient is taking 42ng/kg/min via her own SQ PUMP (0.046 mL/hr)  sildenafil 20 mg oral tablet: 0.5 tab(s) orally once a day   Vitamin B12 1000 mcg oral tablet: 1 tab(s) orally once a day  Xarelto 10 mg oral tablet: 1 tab(s) orally once a day     Please refer to specifics in home medication list (outpatient medication review).    Recommendations:  1) Discussed with attending, will order Remodulin (treprostinil) SQ pump as pt's own med; pump is running continuous. Remodulin 5mg/mL SQ pump is running at 0.046 mL/hr fixed dose (42 nG/mL/min). Dosing weight: 92kg, confirmed w/ [facility] specialty pharmacy. Dr. Maier and Dr. Diamond aware. Patient has been on this dose since end of July and tolerating this dose. Patient has additional vial at bedside so will change pump tonight and each vial lasts her ~2 days. Pt knows how to use/ change her pump. Called [facility] and medication cannot be delivered to pt (whether to home or in the hospital) if pt is in the hospital. Will discuss with pharmacy and attempt to order if possible.   2) Pt is on prednisone 10mg daily at home, ordered for 7mg here. Recommend to reconcile 10mg daily.   3) Atrovent neb ordered for patient. Pt on 2L NC at home.     Elvie Meadows, PharmD, [facility]  Clinical Pharmacy Specialist  [phone] or Teams  Performed medication reconciliation and home medication list updated in outpatient medication review. Medications verified with patient. Patient sees Dr. Yoon as her pulmonologist outpatient.     Home Medications:  Atrovent 500 mcg/2.5 mL inhalation solution: 2.5 milliliter(s) inhaled , As Needed - Pt uses neb and HFA, however says neb work better for her  Atrovent HFA 17 mcg/inh inhalation aerosol: puff(s) inhaled , As Needed  Lasix 20 mg oral tablet: 1 tab(s) orally every other day MWF  omeprazole 40 mg oral delayed release capsule: 1 cap(s) orally once a day  predniSONE: 10 milligram(s) orally once a day  Remodulin 5 mg/mL injectable solution: patient is taking 42ng/kg/min via her own SQ PUMP (0.046 mL/hr)  sildenafil 20 mg oral tablet: 0.5 tab(s) orally once a day   Vitamin B12 1000 mcg oral tablet: 1 tab(s) orally once a day  Xarelto 10 mg oral tablet: 1 tab(s) orally once a day     Please refer to specifics in home medication list (outpatient medication review).    Recommendations:  1) Discussed with attending, will order Remodulin (treprostinil) SQ pump as pt's own med; pump is running continuous. Remodulin 5mg/mL SQ pump is running at 0.046 mL/hr fixed dose (42 nG/mL/min). Dosing weight: 92kg, confirmed w/ [facility] specialty pharmacy. Dr. Maier and Dr. Diamond aware. Patient has been on this dose since end of July and tolerating this dose. Patient has additional vial at bedside so will change pump tonight and each vial lasts her ~2 days. Pt knows how to use/ change her pump. Will attempt to reach [facility] to see if additional medication could be supplied to pt's home and pt's  can bring it in for her.   2) Pt is on prednisone 10mg daily at home, ordered for 7mg here. Recommend to reconcile 10mg daily.   3) Atrovent neb ordered for patient. Pt on 2L NC at home.     Elvie Meadows, PharmD, [facility]  Clinical Pharmacy Specialist  [phone] or Teams

## 2022-08-29 NOTE — PROGRESS NOTE ADULT - PROBLEM SELECTOR PLAN 3
Patient with a history of PE and is now on Xarelto  - Continue with home medication Xarelto 10 mg PO Qd

## 2022-08-29 NOTE — CONSULT NOTE ADULT - SUBJECTIVE AND OBJECTIVE BOX
Per Carballo MD  Cardiology Fellow  [phone]  All Cardiology service information can be found 24/7 on [email or web address], password: [email or web address]    Patient seen and evaluated at bedside    Chief Complaint:    HPI:  42F PMH PE on xarelto, pHTN, ILD, JULIA, right sided heart failure, presents to the hospital for 2 days of palpitations. Patient reports she has had similar episodes of palpitations in the past, however workup in the past was unremarkable. Patient also reporting mild orthopnea. Denies chest pain. Denies diaphoresis. Denies recent use of stimulants including caffeine. Patient also reporting severe nausea and vomiting which has resolved with zofran in the ED. ROS otherwise unremarkable.    ED course: VS with tachycardia. CBC with anemia. CMP normal. BNP 7646. Troponin T normal. EKG with sinus tachycardia, CXR without consolidation. CT chest noncon revealed pulmonary artery dilations likely secondary to pulmonary hypertension. Patient now for admission to medicine for further evaluation and treatment.  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 The patient came in with worsening nausea, vomiting, dyspnea. She came to ED and she was found to be hypotensive with signs of end organ damage. Repeat labs from today show shock liver and EDOUARD. She has signs of Right sided heart failure on exam. MICU was contacted for further management in care.      PMHx:   Asthma  Pulmonary hypertension  Tachycardia  Anemia  Pulmonary hypertension  Pulmonary embolism  Asthma  Sinus tachycardia  Asthma with status asthmaticus, unspecified asthma severity  PE (pulmonary thromboembolism)  Keratoconus  Sinusitis  Corneal disorder  Right heart failure  CAD (coronary artery disease)  H/O pulmonary hypertension  Pulmonary embolism  Asthma  History of tachycardia  On supplemental oxygen by nasal cannula  Pacemaker  Skin mass        PSHx:   History of tubal ligation  No significant past surgical history  Pacemaker  H/O tubal ligation  History of pacemaker        Allergies:  adhesives (Rash)  albuterol (Other; Rash)  penicillin (Rash)  penicillin (Unknown)  vancomycin (Rash)  vancomycin (Unknown)      Home Meds:    Current Medications:   acetaminophen     Tablet .. 650 milliGRAM(s) Oral every 6 hours PRN  aluminum hydroxide/magnesium hydroxide/simethicone Suspension 30 milliLiter(s) Oral every 4 hours PRN  artificial  tears Solution 1 Drop(s) Both EYES three times a day PRN  chlorhexidine 2% Cloths 1 Application(s) Topical <User Schedule>  chlorhexidine 4% Liquid 1 Application(s) Topical <User Schedule>  cyanocobalamin 1000 MICROGram(s) Oral daily  furosemide    Tablet 20 milliGRAM(s) Oral <User Schedule>  ipratropium    for Nebulization 500 MICROGram(s) Nebulizer every 6 hours  melatonin 3 milliGRAM(s) Oral at bedtime PRN  ondansetron Injectable 4 milliGRAM(s) IV Push every 8 hours PRN  pantoprazole    Tablet 40 milliGRAM(s) Oral before breakfast  Remodulin (Treprostinil) 5mG/mL 1 Vial(s) 1 Vial(s) SubCutaneous Continuous Pump  rivaroxaban 10 milliGRAM(s) Oral daily  spironolactone 25 milliGRAM(s) Oral daily  tiotropium 18 MICROgram(s) Capsule 1 Capsule(s) Inhalation daily      FAMILY HISTORY:  Family history of diabetes mellitus  in brother  Family history of diabetes mellitus in mother  FH: anemia        REVIEW OF SYSTEMS:  CONSTITUTIONAL: +weakness, no fevers or chills  EYES/ENT: No visual changes;  No dysphagia  NECK: No pain or stiffness  RESPIRATORY:+ cough,no wheezing,no  hemoptysis; + shortness of breath  CARDIOVASCULAR: No chest pain, + palpitations; + lower extremity edema  GASTROINTESTINAL: + abdominal and epigastric pain. + nausea, + vomiting, or no hematemesis; No diarrhea or constipation. No melena or hematochezia.  BACK: No back pain  GENITOURINARY: No dysuria, frequency or hematuria  NEUROLOGICAL: No numbness or weakness  SKIN: No itching, burning, rashes, or lesions   All other review of systems is negative unless indicated above.    Physical Exam:  T(F): 98.2 (08-29), Max: 98.2 (08-29)  HR: 102 (08-29) (102 - 120)  BP: 113/60 (08-29) (80/41 - 113/60)  RR: 21 (08-29)  SpO2: 97% (08-29)  GENERAL: No acute distress, well-developed  HEAD:  Atraumatic, Normocephalic  ENT: +JVD   CHEST/LUNG: b/l crackles   BACK: No spinal tenderness  HEART: tachycardic rate, regular rhythm   ABDOMEN: Tender+   EXTREMITIES: +edema   PSYCH: Nl behavior, nl affect  NEUROLOGY: AAOx3, non-focal, cranial nerves intact  SKIN: Normal color, No rashes or lesions  LINES:    Cardiovascular Diagnostic Testing:    Echo: Personally reviewed at bedside       Imaging:    CXR: Personally reviewed    Labs: Personally reviewed                        14.9   13.56 )-----------( 423      ( 29 Aug 2022 14:06 )             47.8     08-29    132<L>  |  100  |  33<H>  ----------------------------<  117<H>  5.7<H>   |  13<L>  |  2.53<H>    Ca    8.9      29 Aug 2022 14:06  Phos  5.2     08-28  Mg     1.8     08-28    TPro  7.6  /  Alb  4.4  /  TBili  2.0<H>  /  DBili  x   /  AST  1025<H>  /  ALT  1050<H>  /  AlkPhos  76  08-29    PT/INR - ( 28 Aug 2022 19:33 )   PT: 26.5 sec;   INR: 2.29 ratio         PTT - ( 28 Aug 2022 19:33 )  PTT:36.7 sec    CARDIAC MARKERS ( 28 Aug 2022 19:33 )  23 ng/L / x     / x     / 82 U/L / x     / 2.5 ng/mL  CARDIAC MARKERS ( 28 Aug 2022 11:39 )  15 ng/L / x     / x     / x     / x     / x            Serum Pro-Brain Natriuretic Peptide: 54933 pg/mL (08-28 @ 19:33)  Serum Pro-Brain Natriuretic Peptide: 7946 pg/mL (08-28 @ 11:39)

## 2022-08-29 NOTE — CONSULT NOTE ADULT - PROBLEM SELECTOR RECOMMENDATION 3
Discussed with pulm, will contact patient's pulmonary HTN doctor for further inpatient medication recommendations

## 2022-08-29 NOTE — CONSULT NOTE ADULT - ASSESSMENT
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 The patient came in with worsening nausea, vomiting, dyspnea. She came to ED and she was found to be hypotensive with signs of end organ damage. Repeat labs from today show shock liver and EDOUARD. She has signs of Right sided heart failure on exam, BNP 87463,

## 2022-08-29 NOTE — CONSULT NOTE ADULT - ASSESSMENT
42F PMH PE on xarelto, asthma, ERICA, pHTN potentially eval for lung transplant with [facility] if able to lose weight, ILD, JULIA, right sided heart failure originally presenting with palpitations with RRT for hypotension overnight. Pulm consulted for known Group I PHTN on prostatacylcin sub q infusion, xarelto, and sildenafil    #recommendations  patient likely septic with increasing leukocytosis, increasing liver and kidney dysfunction, and persistent pain. would expedite RUQ US, called lab to add on lipase, obtain blood cultures UA u culture and start empiric abx  given RV dysfunction would be judicious with additional fluid boluses  should continue phosphodiesterase inhibitor and prostacyclin sub q infusion as discontinuing them could lead to acute RV failure leading to LV not filling and worsening hypotension  pharmacy to assist with obtaining Remodulin infusion and may need to be temporarily switched to IV infusion  would repeat TTE  can continue her bronchodilators and her nasal decongestants and steroids as is available in the hospital   supplement oxygen to maintain sat>92%  will touch base with Dr. Car Julian MD  [facility] PGY4  [facility] [id number], [facility] [phone]

## 2022-08-29 NOTE — CONSULT NOTE ADULT - PROBLEM SELECTOR RECOMMENDATION 4
Cardiorenal 2/2 to right sided heart failure   -Monitor I/Os, UOP, gentle diuresis, avoid nephrotoxic medications  -daily standing weights

## 2022-08-29 NOTE — CONSULT NOTE ADULT - ATTENDING COMMENTS
Attending Attestation:    Patient seen and examined with resident/fellow.  Agree with above except as noted.  43 yo female with h/o PE on Xarelto , severe pulmonary hypertension who is admitted for chest pain palpitations and hypotension.  On admission pt had episode of hypotension that responded to small fluid bolus .On exam pt looks uncomfortable. can ly flat but then needs to sit up because of palpitations and feeling war. Lungs are clear to Q. Liver is easily palpated below costal margin, tender. Benign abdomen.  Pt denies cough, hemoptysis or abnormal  sputum. Pt remains on 4 liters 02 and on SQ Remodulin.    Labs reveal LFTs from 200s to 1000. normal alk phos. BNP>7000. Creatinine rising. Clear cxr. Large PA unchanged.    A/P 43 yo female with severe Pulm HTN on Remodulin pump SQ now with Sob  and worsening LFTs likely due hepatic congestion vs acute cholecystitis. Likey acute R heart failure.  1.Admit to MIcU  2. Start NO  3. RUQ ultrasound  4. Continue Remodulin SQ. Consider changing to IV.  5. Start Diuresis.  6. would add antibiotics, Zosyn until RUQ ultrasound r.o cholecystitis.       This patient is critically ill and required frequent bedside visits with therapy change.  Total critical care time spent was __ minutes.

## 2022-08-29 NOTE — CONSULT NOTE ADULT - PROBLEM SELECTOR RECOMMENDATION 9
Elevated BNP, +LE edema,  BNP is 14K with evidence of end organ damage   Plan to transfer to MICU for inotropic support, diuresis, and discussion with pulm about IV medications for pHTN  -Would give trial of 60 or 80 IV furosemide   -Would recommend starting dobutamine  -Trend central sats off CVC and transduce CVP   -Monitor I/O, daily weights   -K>4, Mg >2

## 2022-08-29 NOTE — PROGRESS NOTE ADULT - PROBLEM SELECTOR PLAN 7
Medication reconciliation done per list from clinical pharmacist. Patient's home medication Remodulin ordered as her own - as it is on continuous infusion. Medication reconciliation done 8/29 per list from clinical pharmacist. Patient's home medication Remodulin ordered as her own - as it is on continuous infusion.

## 2022-08-29 NOTE — PROGRESS NOTE ADULT - PROBLEM SELECTOR PLAN 4
Patient with right sided failure secondary to pulmonary hypertension   - Continue with home furosemide 20 mg PO every other day

## 2022-08-29 NOTE — PROGRESS NOTE ADULT - PROBLEM SELECTOR PLAN 1
#Patient presenting with palpitations. Reports she previously had similar episode and was admitted without identifiable cause. - Patient denies stimulant drug use  #Noted hypotension, tachycardia, chest discomfort. Overnight RRT called and MICU consulted 8/29 night.   #abdominal pain    - Will continue to monitor on telemetry  - F/U TSH, lipase. RUQ US   - be judicious with fluid administration give pulmHTN  - Dr. Chavis and Car consulted > F/U on recs

## 2022-08-29 NOTE — CONSULT NOTE ADULT - SUBJECTIVE AND OBJECTIVE BOX
CHIEF COMPLAINT:    HPI:  HPI:  42F PMH PE on xarelto, pHTN, ILD, JULIA, right sided heart failure, presents to the hospital for 2 days of palpitations. Patient reports she has had similar episodes of palpitations in the past, however workup in the past was unremarkable. Patient also reporting mild orthopnea. Denies chest pain. Denies diaphoresis. Denies recent use of stimulants including caffeine. Patient also reporting severe nausea and vomiting which has resolved with zofran in the ED. ROS otherwise unremarkable.    ED course: VS with tachycardia. CBC with anemia. CMP normal. BNP 7646. Troponin T normal. EKG with sinus tachycardia, CXR without consolidation. CT chest noncon revealed pulmonary artery dilations likely secondary to pulmonary hypertension. Patient now for admission to medicine for further evaluation and treatment.  (28 Aug 2022 15:36)    Patient is a limited historian. She states she is experiencing RUQ discomfort and nausea requiring zofran. Recent rapid response during which a liter bolus was ordered which was stopped by MICU during consultation after ~100cc infused. She states that she follows with Dr. Yoon and requires remodulin infusion, sildenafil, and diuresis with lasix. She states that her lasix was recently changed to daily from every other day although denies other medication changes.     this PM, patient still with persistent RUQ abdominal pain sometimes radiating to the back, patient with recurrent hot flashes and feeling cold, denies breathing issues such as increased cough, or wheezing however profound lethargy and SOUZA. Denies pleuritic chest pain or wheezing. no tenderness near pump site    PAST MEDICAL & SURGICAL HISTORY:  Tachycardia      Anemia      Pulmonary hypertension      Pulmonary embolism      Asthma  On home O2 nightly at 2L via NC      PE (pulmonary thromboembolism)  on Xarelto 10 mg daily      Sinusitis      Corneal disorder      Right heart failure      CAD (coronary artery disease)      H/O pulmonary hypertension      Pulmonary embolism      Asthma      History of tachycardia      On supplemental oxygen by nasal cannula  O2 @ 2L      Pacemaker      Skin mass  left upper thigh mass      History of tubal ligation      Pacemaker      H/O tubal ligation      History of pacemaker  12/19 - [employer] model Ingevity [id number]          FAMILY HISTORY:  Family history of diabetes mellitus  in brother    Family history of diabetes mellitus in mother    FH: anemia        SOCIAL HISTORY:  Smoking: [ ] Never Smoked [ ] Former Smoker (__ packs x ___ years) [ ] Current Smoker  (__ packs x ___ years)  Substance Use: [ ] Never Used [ ] Used ____  EtOH Use:  Marital Status: [ ] Single [ ]  [ ]  [ ]   Sexual History:   Occupation:  Recent Travel:  Country of Birth:  Advance Directives:    Allergies    adhesives (Rash)  penicillin (Rash)  penicillin (Unknown)  vancomycin (Rash)  vancomycin (Unknown)    Intolerances    albuterol (Other; Rash)      HOME MEDICATIONS:  Home Medications:  Atrovent 500 mcg/2.5 mL inhalation solution: 2.5 milliliter(s) inhaled , As Needed (29 Aug 2022 12:31)  Atrovent HFA 17 mcg/inh inhalation aerosol: puff(s) inhaled , As Needed (29 Aug 2022 12:31)  Lasix 20 mg oral tablet: 1 tab(s) orally every other day (29 Aug 2022 12:31)  omeprazole 40 mg oral delayed release capsule: 1 cap(s) orally once a day (29 Aug 2022 12:31)  predniSONE: 10 milligram(s) orally once a day (29 Aug 2022 12:31)  Remodulin 5 mg/mL injectable solution: patient is taking 42ng/kg/min via her own SQ PUMP (29 Aug 2022 12:31)  sildenafil 20 mg oral tablet: 0.5 tab(s) orally once a day (29 Aug 2022 12:31)  Vitamin B12 1000 mcg oral tablet: 1 tab(s) orally once a day (29 Aug 2022 12:31)  Xarelto 10 mg oral tablet: 1 tab(s) orally once a day (29 Aug 2022 12:31)      REVIEW OF SYSTEMS:  Patient denies fevers, , chest pain,, , diarrhea, constipation, dysuria, leg swelling, headache, light headedness  OBJECTIVE:  ICU Vital Signs Last 24 Hrs  T(C): 36.8 (29 Aug 2022 11:39), Max: 36.8 (29 Aug 2022 11:39)  T(F): 98.2 (29 Aug 2022 11:39), Max: 98.2 (29 Aug 2022 11:39)  HR: 102 (29 Aug 2022 11:39) (102 - 120)  BP: 113/60 (29 Aug 2022 11:39) (80/41 - 113/60)  BP(mean): --  ABP: --  ABP(mean): --  RR: 21 (29 Aug 2022 11:39) (20 - 22)  SpO2: 97% (29 Aug 2022 11:39) (94% - 97%)    O2 Parameters below as of 29 Aug 2022 11:39  Patient On (Oxygen Delivery Method): nasal cannula  O2 Flow (L/min): 4            08-29 @ 07:01  -  08-29 @ 16:26  --------------------------------------------------------  IN: 520 mL / OUT: 250 mL / NET: 270 mL      CAPILLARY BLOOD GLUCOSE      POCT Blood Glucose.: 115 mg/dL (28 Aug 2022 19:05)      PHYSICAL EXAM:  General: awake and alert, nontoxic appearing upright lying in bed  HEENT: NC/AT, EOMI b/l, conjunctiva normal, MMM  Lymph Nodes: no cervical LAD  Neck: supple. full range of motion  Respiratory: CTA b/l, no w/r/c, appears comfortable on NC no conversational dyspnea or accessory muscle use  Cardiovascular: S1 S2 present, RRR, no m/r/g  Abdomen: TTP of RUQ  Extremities: no c/c/e  Skin: no rashes or lesions noted  Neurological: AAOx3, no focal deficits  Psychiatry: calm, cooperative    LINES:     HOSPITAL MEDICATIONS:  Standing Meds:  chlorhexidine 2% Cloths 1 Application(s) Topical <User Schedule>  cyanocobalamin 1000 MICROGram(s) Oral daily  furosemide    Tablet 20 milliGRAM(s) Oral <User Schedule>  ipratropium    for Nebulization 500 MICROGram(s) Nebulizer every 6 hours  pantoprazole    Tablet 40 milliGRAM(s) Oral before breakfast  Remodulin (Treprostinil) 5mG/mL 1 Vial(s) 1 Vial(s) SubCutaneous Continuous Pump  rivaroxaban 10 milliGRAM(s) Oral daily  spironolactone 25 milliGRAM(s) Oral daily  tiotropium 18 MICROgram(s) Capsule 1 Capsule(s) Inhalation daily      PRN Meds:  acetaminophen     Tablet .. 650 milliGRAM(s) Oral every 6 hours PRN  aluminum hydroxide/magnesium hydroxide/simethicone Suspension 30 milliLiter(s) Oral every 4 hours PRN  artificial  tears Solution 1 Drop(s) Both EYES three times a day PRN  melatonin 3 milliGRAM(s) Oral at bedtime PRN  ondansetron Injectable 4 milliGRAM(s) IV Push every 8 hours PRN      LABS:                        14.9   12.83 )-----------( 410      ( 28 Aug 2022 19:33 )             46.9     Hgb Trend: 14.9<--, 14.9<--  08-29    132<L>  |  100  |  33<H>  ----------------------------<  117<H>  5.7<H>   |  13<L>  |  2.53<H>    Ca    8.9      29 Aug 2022 14:06  Phos  5.2     08-28  Mg     1.8     08-28    TPro  7.6  /  Alb  4.4  /  TBili  2.0<H>  /  DBili  x   /  AST  1025<H>  /  ALT  1050<H>  /  AlkPhos  76  08-29    Creatinine Trend: 2.53<--, 1.48<--, 1.21<--  PT/INR - ( 28 Aug 2022 19:33 )   PT: 26.5 sec;   INR: 2.29 ratio         PTT - ( 28 Aug 2022 19:33 )  PTT:36.7 sec    Arterial Blood Gas:  08-28 @ 19:31  7.37/23/94/13/98.5/-9.7  ABG lactate: --    Venous Blood Gas:  08-28 @ 11:23  7.32/42/24/22/24.8  VBG Lactate: 2.7      MICROBIOLOGY:       RADIOLOGY:  [ ] Reviewed and interpreted by me    PULMONARY FUNCTION TESTS:    EKG:   CHIEF COMPLAINT:    HPI:  HPI:  42F PMH PE on xarelto, pHTN, ILD, JULIA, right sided heart failure, presents to the hospital for 2 days of palpitations. Patient reports she has had similar episodes of palpitations in the past, however workup in the past was unremarkable. Patient also reporting mild orthopnea. Denies chest pain. Denies diaphoresis. Denies recent use of stimulants including caffeine. Patient also reporting severe nausea and vomiting which has resolved with zofran in the ED. ROS otherwise unremarkable.    ED course: VS with tachycardia. CBC with anemia. CMP normal. BNP 7646. Troponin T normal. EKG with sinus tachycardia, CXR without consolidation. CT chest noncon revealed pulmonary artery dilations likely secondary to pulmonary hypertension. Patient now for admission to medicine for further evaluation and treatment.  (28 Aug 2022 15:36)    Patient is a limited historian. She states she is experiencing RUQ discomfort and nausea requiring zofran. Recent rapid response during which a liter bolus was ordered which was stopped by MICU during consultation after ~100cc infused. She states that she follows with Dr. Yoon and requires remodulin infusion, sildenafil, and diuresis with lasix. She states that her lasix was recently changed to daily from every other day although denies other medication changes.     this PM, patient still with persistent RUQ abdominal pain sometimes radiating to the back, patient with recurrent hot flashes and feeling cold, denies breathing issues such as increased cough, or wheezing however profound lethargy and SOUZA. Denies pleuritic chest pain or wheezing. no tenderness near pump site    PAST MEDICAL & SURGICAL HISTORY:  Tachycardia      Anemia      Pulmonary hypertension      Pulmonary embolism      Asthma  On home O2 nightly at 2L via NC      PE (pulmonary thromboembolism)  on Xarelto 10 mg daily      Sinusitis      Corneal disorder      Right heart failure      CAD (coronary artery disease)      H/O pulmonary hypertension      Pulmonary embolism      Asthma      History of tachycardia      On supplemental oxygen by nasal cannula  O2 @ 2L      Pacemaker      Skin mass  left upper thigh mass      History of tubal ligation      Pacemaker      H/O tubal ligation      History of pacemaker  12/19 - [employer] model Ingevity 4418          FAMILY HISTORY:  Family history of diabetes mellitus  in brother    Family history of diabetes mellitus in mother    FH: anemia        SOCIAL HISTORY:  Smoking: [x ] Never Smoked [ ] Former Smoker (__ packs x ___ years) [ ] Current Smoker  (__ packs x ___ years)  Substance Use: [ ] Never Used [ ] Used ____  EtOH Use:  Marital Status: [ ] Single [ ]  [ ]  [ ]   Sexual History:   Occupation:  Recent Travel:  Country of Birth:  Advance Directives:    Allergies    adhesives (Rash)  penicillin (Rash)  penicillin (Unknown)  vancomycin (Rash)  vancomycin (Unknown)    Intolerances    albuterol (Other; Rash)      HOME MEDICATIONS:  Home Medications:  Atrovent 500 mcg/2.5 mL inhalation solution: 2.5 milliliter(s) inhaled , As Needed (29 Aug 2022 12:31)  Atrovent HFA 17 mcg/inh inhalation aerosol: puff(s) inhaled , As Needed (29 Aug 2022 12:31)  Lasix 20 mg oral tablet: 1 tab(s) orally every other day (29 Aug 2022 12:31)  omeprazole 40 mg oral delayed release capsule: 1 cap(s) orally once a day (29 Aug 2022 12:31)  predniSONE: 10 milligram(s) orally once a day (29 Aug 2022 12:31)  Remodulin 5 mg/mL injectable solution: patient is taking 42ng/kg/min via her own SQ PUMP (29 Aug 2022 12:31)  sildenafil 20 mg oral tablet: 0.5 tab(s) orally once a day (29 Aug 2022 12:31)  Vitamin B12 1000 mcg oral tablet: 1 tab(s) orally once a day (29 Aug 2022 12:31)  Xarelto 10 mg oral tablet: 1 tab(s) orally once a day (29 Aug 2022 12:31)      REVIEW OF SYSTEMS:  Patient denies fevers, , chest pain,, , diarrhea, constipation, dysuria, leg swelling, headache, light headedness  OBJECTIVE:  ICU Vital Signs Last 24 Hrs  T(C): 36.8 (29 Aug 2022 11:39), Max: 36.8 (29 Aug 2022 11:39)  T(F): 98.2 (29 Aug 2022 11:39), Max: 98.2 (29 Aug 2022 11:39)  HR: 102 (29 Aug 2022 11:39) (102 - 120)  BP: 113/60 (29 Aug 2022 11:39) (80/41 - 113/60)  BP(mean): --  ABP: --  ABP(mean): --  RR: 21 (29 Aug 2022 11:39) (20 - 22)  SpO2: 97% (29 Aug 2022 11:39) (94% - 97%)    O2 Parameters below as of 29 Aug 2022 11:39  Patient On (Oxygen Delivery Method): nasal cannula  O2 Flow (L/min): 4            08-29 @ 07:01  -  08-29 @ 16:26  --------------------------------------------------------  IN: 520 mL / OUT: 250 mL / NET: 270 mL      CAPILLARY BLOOD GLUCOSE      POCT Blood Glucose.: 115 mg/dL (28 Aug 2022 19:05)      PHYSICAL EXAM:  General: awake and alert, nontoxic appearing upright lying in bed  HEENT: NC/AT, EOMI b/l, conjunctiva normal, MMM  Lymph Nodes: no cervical LAD  Neck: supple. full range of motion  Respiratory: CTA b/l, no w/r/c, appears comfortable on NC no conversational dyspnea or accessory muscle use  Cardiovascular: S1 S2 present, RRR, no m/r/g  Abdomen: TTP of RUQ  Extremities: no c/c/e  Skin: no rashes or lesions noted  Neurological: AAOx3, no focal deficits  Psychiatry: calm, cooperative    LINES:     HOSPITAL MEDICATIONS:  Standing Meds:  chlorhexidine 2% Cloths 1 Application(s) Topical <User Schedule>  cyanocobalamin 1000 MICROGram(s) Oral daily  furosemide    Tablet 20 milliGRAM(s) Oral <User Schedule>  ipratropium    for Nebulization 500 MICROGram(s) Nebulizer every 6 hours  pantoprazole    Tablet 40 milliGRAM(s) Oral before breakfast  Remodulin (Treprostinil) 5mG/mL 1 Vial(s) 1 Vial(s) SubCutaneous Continuous Pump  rivaroxaban 10 milliGRAM(s) Oral daily  spironolactone 25 milliGRAM(s) Oral daily  tiotropium 18 MICROgram(s) Capsule 1 Capsule(s) Inhalation daily      PRN Meds:  acetaminophen     Tablet .. 650 milliGRAM(s) Oral every 6 hours PRN  aluminum hydroxide/magnesium hydroxide/simethicone Suspension 30 milliLiter(s) Oral every 4 hours PRN  artificial  tears Solution 1 Drop(s) Both EYES three times a day PRN  melatonin 3 milliGRAM(s) Oral at bedtime PRN  ondansetron Injectable 4 milliGRAM(s) IV Push every 8 hours PRN      LABS:                        14.9   12.83 )-----------( 410      ( 28 Aug 2022 19:33 )             46.9     Hgb Trend: 14.9<--, 14.9<--  08-29    132<L>  |  100  |  33<H>  ----------------------------<  117<H>  5.7<H>   |  13<L>  |  2.53<H>    Ca    8.9      29 Aug 2022 14:06  Phos  5.2     08-28  Mg     1.8     08-28    TPro  7.6  /  Alb  4.4  /  TBili  2.0<H>  /  DBili  x   /  AST  1025<H>  /  ALT  1050<H>  /  AlkPhos  76  08-29    Creatinine Trend: 2.53<--, 1.48<--, 1.21<--  PT/INR - ( 28 Aug 2022 19:33 )   PT: 26.5 sec;   INR: 2.29 ratio         PTT - ( 28 Aug 2022 19:33 )  PTT:36.7 sec    Arterial Blood Gas:  08-28 @ 19:31  7.37/23/94/13/98.5/-9.7  ABG lactate: --    Venous Blood Gas:  08-28 @ 11:23  7.32/42/24/22/24.8  VBG Lactate: 2.7      MICROBIOLOGY:       RADIOLOGY:  [ ] Reviewed and interpreted by me    PULMONARY FUNCTION TESTS:    EKG:

## 2022-08-29 NOTE — PROGRESS NOTE ADULT - SUBJECTIVE AND OBJECTIVE BOX
[facility] Division of Hospital Medicine  Tiffanie Maier MD M-F, 8A-5P: MS Teams, Pager: [phone]  Other Times: Ext: [phone], Pager: [phone]      Patient is a 42y old  Female who presents with a chief complaint of Palpitations (28 Aug 2022 23:26)      SUBJECTIVE / OVERNIGHT EVENTS:  Patient was examined this morning. She is reporting dyspnea and chest discomfort Denies dizziness vision change, nausea abdominal pain, weakness, swelling numbness.       ADDITIONAL REVIEW OF SYSTEMS:    MEDICATIONS  (STANDING):  chlorhexidine 2% Cloths 1 Application(s) Topical <User Schedule>  cyanocobalamin 1000 MICROGram(s) Oral daily  furosemide    Tablet 20 milliGRAM(s) Oral <User Schedule>  ipratropium    for Nebulization 500 MICROGram(s) Nebulizer every 6 hours  pantoprazole    Tablet 40 milliGRAM(s) Oral before breakfast  predniSONE   Tablet 7 milliGRAM(s) Oral daily  Remodulin (Treprostinil) 5mG/mL 1 Vial(s) 1 Vial(s) SubCutaneous Continuous Pump  rivaroxaban 10 milliGRAM(s) Oral daily  spironolactone 25 milliGRAM(s) Oral daily  tiotropium 18 MICROgram(s) Capsule 1 Capsule(s) Inhalation daily    MEDICATIONS  (PRN):  acetaminophen     Tablet .. 650 milliGRAM(s) Oral every 6 hours PRN Temp greater or equal to 38C (100.4F), Mild Pain (1 - 3)  aluminum hydroxide/magnesium hydroxide/simethicone Suspension 30 milliLiter(s) Oral every 4 hours PRN Dyspepsia  artificial  tears Solution 1 Drop(s) Both EYES three times a day PRN Dry Eyes  melatonin 3 milliGRAM(s) Oral at bedtime PRN Insomnia  ondansetron Injectable 4 milliGRAM(s) IV Push every 8 hours PRN Nausea and/or Vomiting      CAPILLARY BLOOD GLUCOSE      POCT Blood Glucose.: 115 mg/dL (28 Aug 2022 19:05)      I&O's Summary      Daily     Daily Weight in k.2 (29 Aug 2022 08:30)    PHYSICAL EXAM:  Vital Signs Last 24 Hrs  T(C): 36.8 (29 Aug 2022 11:39), Max: 36.8 (29 Aug 2022 11:39)  T(F): 98.2 (29 Aug 2022 11:39), Max: 98.2 (29 Aug 2022 11:39)  HR: 102 (29 Aug 2022 11:39) (102 - 120)  BP: 113/60 (29 Aug 2022 11:39) (80/41 - 113/60)  BP(mean): 95 (28 Aug 2022 15:36) (95 - 95)  RR: 21 (29 Aug 2022 11:39) (20 - 22)  SpO2: 97% (29 Aug 2022 11:39) (94% - 99%)    Parameters below as of 29 Aug 2022 11:39  Patient On (Oxygen Delivery Method): nasal cannula  O2 Flow (L/min): 4    CONSTITUTIONAL: NAD, well-developed  EYES: PERRLA; conjunctiva and sclera clear  ENMT: Moist oral mucosa,   NECK: Supple,   RESPIRATORY: Normal respiratory effort; lungs are clear to auscultation bilaterally  CARDIOVASCULAR: Regular rate and rhythm, normal S1 and S2, no murmur/rub/gallop; No lower extremity edema; Peripheral pulses are 2+ bilaterally  ABDOMEN: Nontender to palpation, normoactive bowel sounds, no rebound/guarding;   MUSCULOSKELETAL:  no clubbing or cyanosis of digits; no joint swelling or tenderness to palpation, moving all extremities   PSYCH: A+O to person, place, and time; affect appropriate  NEUROLOGY: CN 2-12 are intact and symmetric; no gross sensory/motor deficits   SKIN: No rashes; no palpable lesions      LABS:                        14.9   12.83 )-----------( 410      ( 28 Aug 2022 19:33 )             46.9         134<L>  |  103  |  22  ----------------------------<  126<H>  4.1   |  14<L>  |  1.48<H>    Ca    8.5      28 Aug 2022 19:33  Phos  5.2       Mg     1.8         TPro  7.5  /  Alb  4.2  /  TBili  1.4<H>  /  DBili  x   /  AST  244<H>  /  ALT  247<H>  /  AlkPhos  63      LIVER FUNCTIONS - ( 28 Aug 2022 19:33 )  Alb: 4.2 g/dL / Pro: 7.5 g/dL / ALK PHOS: 63 U/L / ALT: 247 U/L / AST: 244 U/L / GGT: x           PT/INR - ( 28 Aug 2022 19:33 )   PT: 26.5 sec;   INR: 2.29 ratio         PTT - ( 28 Aug 2022 19:33 )  PTT:36.7 sec  CARDIAC MARKERS ( 28 Aug 2022 19:33 )  x     / x     / 82 U/L / x     / 2.5 ng/mL          SARS-CoV-2: NotDetec (28 Aug 2022 12:57)      RADIOLOGY & ADDITIONAL TESTS:  Results Reviewed:   Imaging Personally Reviewed:  Electrocardiogram Personally Reviewed:    COORDINATION OF CARE:  Care Discussed with Consultants/Other Providers [Y/N]:  Prior or Outpatient Records Reviewed [Y/N]:

## 2022-08-29 NOTE — CHART NOTE - NSCHARTNOTEFT_GEN_A_CORE
MICU Accept Note    CHIEF COMPLAINT: Hypotension     HPI / INTERVAL HISTORY: 42F PMH PE on xarelto, pHTN, ILD, JULIA, right sided heart failure, presents to the hospital for 2 days of palpitations. Patient reports she has had similar episodes of palpitations in the past, however workup in the past was unremarkable. Patient also reporting mild orthopnea. Denies chest pain. Denies diaphoresis. Denies recent use of stimulants including caffeine. Patient also reporting severe nausea and vomiting which has resolved with zofran in the ED. ROS otherwise unremarkable.    ED course: VS with tachycardia. CBC with anemia. CMP normal. BNP 7646. Troponin T normal. EKG with sinus tachycardia, CXR without consolidation. CT chest noncon revealed pulmonary artery dilations likely secondary to pulmonary hypertension. Patient now for admission to medicine for further evaluation and treatment.       RRT called on 8/28 for hypotension (SBP 80s) w/ tachycardia to the 130s, s/p 50 cc of LR that was discontinued i/s/o significant pHTN. MICU consulted at this time. SBP improved to 100s. MICU did not accept at this time given improvement of SBP - recommend HF consult and echo given hx of pHTN w/ RV failure, RUQ US given concern for cholecystitis vs hepatic congestion, and cardio consult if chest pain worsens/recurs. On 8/29, patient had additional episode of hypotension (BP 80/41) w/ tachycardia to 108. Patient deemed candidate for MICU given concern for acute, decompensated RH failure i/s/o of pHTN w/ possible LV failure/dysfunction and cardiogenic shock requiring diuresis and pressure support.         PAST MEDICAL & SURGICAL HISTORY:  Tachycardia      Anemia      Pulmonary hypertension      Pulmonary embolism      Asthma  On home O2 nightly at 2L via NC      PE (pulmonary thromboembolism)  on Xarelto 10 mg daily      Sinusitis      Corneal disorder      Right heart failure      CAD (coronary artery disease)      H/O pulmonary hypertension      Pulmonary embolism      Asthma      History of tachycardia      On supplemental oxygen by nasal cannula  O2 @ 2L      Pacemaker      Skin mass  left upper thigh mass      History of tubal ligation      Pacemaker      H/O tubal ligation      History of pacemaker  12/19 - TRADE TO [employer] model Ingevity 4460          FAMILY HISTORY:  Family history of diabetes mellitus  in brother    Family history of diabetes mellitus in mother    FH: anemia        SOCIAL HISTORY:  Smoking:   Substance Use:   EtOH Use:   Marital Status:   Sexual History:   Occupation:  Recent Travel:  Country of Birth:   Advance Directives:     HOME MEDICATIONS:      Allergies    adhesives (Rash)  penicillin (Rash)  penicillin (Unknown)  vancomycin (Rash)  vancomycin (Unknown)    Intolerances    albuterol (Other; Rash)        REVIEW OF SYSTEMS:  Constitutional: No fevers, chills, weight loss, weight gain  HEENT: No vision problems, eye pain, nasal congestion, rhinorrhea, sore throat, dysphagia  CV: No chest pain, orthopnea, palpitations  Resp: No cough, dyspnea, wheezing, hemoptysis  GI: No nausea, vomiting, diarrhea, constipation, abdominal pain  : [ ] dysuria [ ] nocturia [ ] hematuria [ ] increased urinary frequency  Musculoskeletal: [ ] back pain [ ] myalgias [ ] arthralgias [ ] fracture  Skin: [ ] rash [ ] itch  Neurological: [ ] headache [ ] dizziness [ ] syncope [ ] weakness [ ] numbness  Psychiatric: [ ] anxiety [ ] depression  Endocrine: [ ] diabetes [ ] thyroid problem  Hematologic/Lymphatic: [ ] anemia [ ] bleeding problem  Allergic/Immunologic: [ ] itchy eyes [ ] nasal discharge [ ] hives [ ] angioedema  [ ] All other systems negative  [ ] Unable to assess ROS because ________    OBJECTIVE:  ICU Vital Signs Last 24 Hrs  T(C): 36.8 (29 Aug 2022 11:39), Max: 36.8 (29 Aug 2022 11:39)  T(F): 98.2 (29 Aug 2022 11:39), Max: 98.2 (29 Aug 2022 11:39)  HR: 102 (29 Aug 2022 11:39) (102 - 120)  BP: 113/60 (29 Aug 2022 11:39) (80/41 - 113/60)  BP(mean): --  ABP: --  ABP(mean): --  RR: 21 (29 Aug 2022 11:39) (20 - 22)  SpO2: 97% (29 Aug 2022 11:39) (94% - 97%)    O2 Parameters below as of 29 Aug 2022 11:39  Patient On (Oxygen Delivery Method): nasal cannula  O2 Flow (L/min): 4 08-29 @ 07:01  -  08-29 @ 16:27  --------------------------------------------------------  IN: 520 mL / OUT: 250 mL / NET: 270 mL      CAPILLARY BLOOD GLUCOSE      POCT Blood Glucose.: 115 mg/dL (28 Aug 2022 19:05)      PHYSICAL EXAM:  General:   HEENT:   Neck:   Chest/Lungs:  Heart:  Abdomen:   Extremities:   Skin:   Neuro:   Psych:     LINES:     HOSPITAL MEDICATIONS:  MEDICATIONS  (STANDING):  chlorhexidine 2% Cloths 1 Application(s) Topical <User Schedule>  cyanocobalamin 1000 MICROGram(s) Oral daily  furosemide    Tablet 20 milliGRAM(s) Oral <User Schedule>  ipratropium    for Nebulization 500 MICROGram(s) Nebulizer every 6 hours  pantoprazole    Tablet 40 milliGRAM(s) Oral before breakfast  Remodulin (Treprostinil) 5mG/mL 1 Vial(s) 1 Vial(s) SubCutaneous Continuous Pump  rivaroxaban 10 milliGRAM(s) Oral daily  spironolactone 25 milliGRAM(s) Oral daily  tiotropium 18 MICROgram(s) Capsule 1 Capsule(s) Inhalation daily    MEDICATIONS  (PRN):  acetaminophen     Tablet .. 650 milliGRAM(s) Oral every 6 hours PRN Temp greater or equal to 38C (100.4F), Mild Pain (1 - 3)  aluminum hydroxide/magnesium hydroxide/simethicone Suspension 30 milliLiter(s) Oral every 4 hours PRN Dyspepsia  artificial  tears Solution 1 Drop(s) Both EYES three times a day PRN Dry Eyes  melatonin 3 milliGRAM(s) Oral at bedtime PRN Insomnia  ondansetron Injectable 4 milliGRAM(s) IV Push every 8 hours PRN Nausea and/or Vomiting      LABS:                        14.9   12.83 )-----------( 410      ( 28 Aug 2022 19:33 )             46.9     Hgb Trend: 14.9<--, 14.9<--  08-29    132<L>  |  100  |  33<H>  ----------------------------<  117<H>  5.7<H>   |  13<L>  |  2.53<H>    Ca    8.9      29 Aug 2022 14:06  Phos  5.2     08-28  Mg     1.8     08-28    TPro  7.6  /  Alb  4.4  /  TBili  2.0<H>  /  DBili  x   /  AST  1025<H>  /  ALT  1050<H>  /  AlkPhos  76  08-29    Creatinine Trend: 2.53<--, 1.48<--, 1.21<--  PT/INR - ( 28 Aug 2022 19:33 )   PT: 26.5 sec;   INR: 2.29 ratio         PTT - ( 28 Aug 2022 19:33 )  PTT:36.7 sec    Arterial Blood Gas:  08-28 @ 19:31  7.37/23/94/13/98.5/-9.7  ABG lactate: --    Venous Blood Gas:  08-28 @ 11:23  7.32/42/24/22/24.8  VBG Lactate: 2.7      RADIOLOGY & ADDITIONAL TESTS:      ACC: [id number] EXAM:  XR CHEST PORTABLE URGENT 1V                          PROCEDURE DATE:  08/28/2022          INTERPRETATION:  HISTORY: Chest pain. Subjective fevers.    TECHNIQUE: A single portable view of the chest was obtained.    COMPARISON: CT scan dated 6/15/2022 and chest radiograph dated 12/14/2021    FINDINGS:  The cardiac silhouette is normal in size. There is a   left-sided dual-lead pacemaker. There are no focal consolidations or   pleural effusions. There are enlarged pulmonary arteries, unchanged. The   osseous structures are intact.    IMPRESSION: Stable enlarged pulmonary arteries. No focal consolidation is   seen.    --- End of Report ---            AVNI MIRANDA MD; Attending Radiologist  This document has been electronically signed. Aug 28 2022  1:18PM      ASSESSMENT AND PLAN:  Pt is a 43 yo F w/ PMHx of PE on xarelto, pHTN (class 1 per [employer]), ILD, JULIA, right sided heart failure admitted to [facility] for evaluation of palpitations/chest pain, s/p RRT for hypotension (SBP 80s) w/ tachycardia to 130s, now being transferred to MICU for persistent hypotension w/ concern for biventricular HF w/ need for diuresis and pressure support.       Neuro  -AAAOx3  -No active issues at this time     Cardiovascular    #Left heart failure  -echo  -furosemide 40mg IV BID   -strict I+Os  -fluid restriction     #Cor Pulmonale   -RHF likely 2/2 severe pHTN  -Follows-up outpatient w/ Dr. Car for pHTN  -Class 1 pHTN  -hold sildenafil i/s/o hypotension   -c/w atrovent     Respiratory    #Tachypnea  -CXR on shows enlarged pulmonary arteries, no pleural effusions or consolidations noted  -ABG w/ pH 7.37, pCO2 23, pO2 94, HCO3 12, O2 sat 98.5%   -on 4L NC, saturating appropriately  -CTM     GI/Nutrition    #RUQ discomfort  -likely cholecystitis vs hepatic congestion i/s/o of RHF   -AST/ALT elevated at 1025 and 1050, respectively   -Alk Phosph wnl   -f/u RUQ US  -f/u lipase       #/Renal    #EDOUARD   -Likely prerenal 2/2 hypovolemia i/s/o of decompensated HF vs intrinsic 2/2 ATN  -BUN 33, SCr 2.53   -per EMR, BUN 9 and SCr 0.93 in 12/21       #Skin    #ID    #Endocrine    #Hematologic/DVT ppx    #Ethics MICU Accept Note    CHIEF COMPLAINT: Hypotension     HPI / INTERVAL HISTORY: 42F PMH PE on xarelto, pHTN, ILD, JULIA, right sided heart failure, presents to the hospital for 2 days of palpitations. Patient reports she has had similar episodes of palpitations in the past, however workup in the past was unremarkable. Patient also reporting mild orthopnea. Denies chest pain. Denies diaphoresis. Denies recent use of stimulants including caffeine. Patient also reporting severe nausea and vomiting which has resolved with zofran in the ED. ROS otherwise unremarkable.    ED course: VS with tachycardia. CBC with anemia. CMP normal. BNP 7646. Troponin T normal. EKG with sinus tachycardia, CXR without consolidation. CT chest noncon revealed pulmonary artery dilations likely secondary to pulmonary hypertension. Patient now for admission to medicine for further evaluation and treatment.     RRT called on 8/28 for hypotension (SBP 80s) w/ tachycardia to the 130s, s/p 50 cc of LR that was discontinued i/s/o significant pHTN. MICU consulted at this time. SBP improved to 100s. MICU did not accept at this time given improvement of SBP - recommend HF consult and echo given hx of pHTN w/ RV failure, RUQ US given concern for cholecystitis vs hepatic congestion, and cardio consult if chest pain worsens/recurs. On 8/29, patient had additional episode of hypotension (BP 80/41) w/ tachycardia to 108. Patient deemed candidate for MICU given concern for acute, decompensated RH failure i/s/o of pHTN w/ possible LV failure/dysfunction and cardiogenic shock requiring diuresis and pressure support.         PAST MEDICAL & SURGICAL HISTORY:  Tachycardia      Anemia      Pulmonary hypertension      Pulmonary embolism      Asthma  On home O2 nightly at 2L via NC      PE (pulmonary thromboembolism)  on Xarelto 10 mg daily      Sinusitis      Corneal disorder      Right heart failure      CAD (coronary artery disease)      H/O pulmonary hypertension      Pulmonary embolism      Asthma      History of tachycardia      On supplemental oxygen by nasal cannula  O2 @ 2L      Pacemaker      Skin mass  left upper thigh mass      History of tubal ligation      Pacemaker      H/O tubal ligation      History of pacemaker  12/19 - [employer] model Ingevity 4427          FAMILY HISTORY:  Family history of diabetes mellitus  in brother    Family history of diabetes mellitus in mother    FH: anemia        SOCIAL HISTORY:  Smoking:   Substance Use:   EtOH Use:   Marital Status:   Sexual History:   Occupation:  Recent Travel:  Country of Birth:   Advance Directives:     HOME MEDICATIONS:      Allergies    adhesives (Rash)  penicillin (Rash)  penicillin (Unknown)  vancomycin (Rash)  vancomycin (Unknown)    Intolerances    albuterol (Other; Rash)        REVIEW OF SYSTEMS:  Constitutional: No fevers, chills, weight loss, weight gain  HEENT: No vision problems, eye pain, nasal congestion, rhinorrhea, sore throat, dysphagia  CV: No chest pain, orthopnea, palpitations  Resp: No cough, dyspnea, wheezing, hemoptysis  GI: No nausea, vomiting, diarrhea, constipation, abdominal pain  : [ ] dysuria [ ] nocturia [ ] hematuria [ ] increased urinary frequency  Musculoskeletal: [ ] back pain [ ] myalgias [ ] arthralgias [ ] fracture  Skin: [ ] rash [ ] itch  Neurological: [ ] headache [ ] dizziness [ ] syncope [ ] weakness [ ] numbness  Psychiatric: [ ] anxiety [ ] depression  Endocrine: [ ] diabetes [ ] thyroid problem  Hematologic/Lymphatic: [ ] anemia [ ] bleeding problem  Allergic/Immunologic: [ ] itchy eyes [ ] nasal discharge [ ] hives [ ] angioedema  [ ] All other systems negative  [ ] Unable to assess ROS because ________    OBJECTIVE:  ICU Vital Signs Last 24 Hrs  T(C): 36.8 (29 Aug 2022 11:39), Max: 36.8 (29 Aug 2022 11:39)  T(F): 98.2 (29 Aug 2022 11:39), Max: 98.2 (29 Aug 2022 11:39)  HR: 102 (29 Aug 2022 11:39) (102 - 120)  BP: 113/60 (29 Aug 2022 11:39) (80/41 - 113/60)  BP(mean): --  ABP: --  ABP(mean): --  RR: 21 (29 Aug 2022 11:39) (20 - 22)  SpO2: 97% (29 Aug 2022 11:39) (94% - 97%)    O2 Parameters below as of 29 Aug 2022 11:39  Patient On (Oxygen Delivery Method): nasal cannula  O2 Flow (L/min): 4 08-29 @ 07:01  -  08-29 @ 16:27  --------------------------------------------------------  IN: 520 mL / OUT: 250 mL / NET: 270 mL      CAPILLARY BLOOD GLUCOSE      POCT Blood Glucose.: 115 mg/dL (28 Aug 2022 19:05)      PHYSICAL EXAM:  General:   HEENT:   Neck:   Chest/Lungs:  Heart:  Abdomen:   Extremities:   Skin:   Neuro:   Psych:     LINES:     HOSPITAL MEDICATIONS:  MEDICATIONS  (STANDING):  chlorhexidine 2% Cloths 1 Application(s) Topical <User Schedule>  cyanocobalamin 1000 MICROGram(s) Oral daily  furosemide    Tablet 20 milliGRAM(s) Oral <User Schedule>  ipratropium    for Nebulization 500 MICROGram(s) Nebulizer every 6 hours  pantoprazole    Tablet 40 milliGRAM(s) Oral before breakfast  Remodulin (Treprostinil) 5mG/mL 1 Vial(s) 1 Vial(s) SubCutaneous Continuous Pump  rivaroxaban 10 milliGRAM(s) Oral daily  spironolactone 25 milliGRAM(s) Oral daily  tiotropium 18 MICROgram(s) Capsule 1 Capsule(s) Inhalation daily    MEDICATIONS  (PRN):  acetaminophen     Tablet .. 650 milliGRAM(s) Oral every 6 hours PRN Temp greater or equal to 38C (100.4F), Mild Pain (1 - 3)  aluminum hydroxide/magnesium hydroxide/simethicone Suspension 30 milliLiter(s) Oral every 4 hours PRN Dyspepsia  artificial  tears Solution 1 Drop(s) Both EYES three times a day PRN Dry Eyes  melatonin 3 milliGRAM(s) Oral at bedtime PRN Insomnia  ondansetron Injectable 4 milliGRAM(s) IV Push every 8 hours PRN Nausea and/or Vomiting      LABS:                        14.9   12.83 )-----------( 410      ( 28 Aug 2022 19:33 )             46.9     Hgb Trend: 14.9<--, 14.9<--  08-29    132<L>  |  100  |  33<H>  ----------------------------<  117<H>  5.7<H>   |  13<L>  |  2.53<H>    Ca    8.9      29 Aug 2022 14:06  Phos  5.2     08-28  Mg     1.8     08-28    TPro  7.6  /  Alb  4.4  /  TBili  2.0<H>  /  DBili  x   /  AST  1025<H>  /  ALT  1050<H>  /  AlkPhos  76  08-29    Creatinine Trend: 2.53<--, 1.48<--, 1.21<--  PT/INR - ( 28 Aug 2022 19:33 )   PT: 26.5 sec;   INR: 2.29 ratio         PTT - ( 28 Aug 2022 19:33 )  PTT:36.7 sec    Arterial Blood Gas:  08-28 @ 19:31  7.37/23/94/13/98.5/-9.7  ABG lactate: --    Venous Blood Gas:  08-28 @ 11:23  7.32/42/24/22/24.8  VBG Lactate: 2.7      RADIOLOGY & ADDITIONAL TESTS:      ACC: [id number] EXAM:  XR CHEST PORTABLE URGENT 1V                          PROCEDURE DATE:  08/28/2022          INTERPRETATION:  HISTORY: Chest pain. Subjective fevers.    TECHNIQUE: A single portable view of the chest was obtained.    COMPARISON: CT scan dated 6/15/2022 and chest radiograph dated 12/14/2021    FINDINGS:  The cardiac silhouette is normal in size. There is a   left-sided dual-lead pacemaker. There are no focal consolidations or   pleural effusions. There are enlarged pulmonary arteries, unchanged. The   osseous structures are intact.    IMPRESSION: Stable enlarged pulmonary arteries. No focal consolidation is   seen.    --- End of Report ---            AVNI MIRANDA MD; Attending Radiologist  This document has been electronically signed. Aug 28 2022  1:18PM      ASSESSMENT AND PLAN:  Pt is a 41 yo F w/ PMHx of PE on xarelto, pHTN (class 1 per [employer]), ILD, JULIA, right sided heart failure admitted to [facility] for evaluation of palpitations/chest pain, s/p RRT for hypotension (SBP 80s) w/ tachycardia to 130s, now being transferred to MICU for persistent hypotension w/ concern for cardiogenic shock i/s/o new onset LHF.      Neuro  -AAAOx3  -No active issues at this time     Cardiovascular  #Left heart failure?  -echo  -furosemide 40mg IV BID   -strict I+Os  -fluid restriction     #Cor Pulmonale   -RHF likely 2/2 severe pHTN  -Follows-up outpatient w/ Dr. Yoon for pHTN  -Class 1 pHTN  -hold sildenafil i/s/o hypotension   -c/w atrovent     Respiratory  #Tachypnea  -CXR on shows enlarged pulmonary arteries, no pleural effusions or consolidations noted  -ABG w/ pH 7.37, pCO2 23, pO2 94, HCO3 12, O2 sat 98.5%   -on 4L NC, saturating appropriately  -CTM     GI/Nutrition  #RUQ discomfort  -likely cholecystitis vs hepatic congestion vs ischemic hepatitis   -AST/ALT elevated at 1025 and 1050, respectively   -Alk Phosph wnl   -f/u RUQ US  -f/u lipase       /Renal  #EDOUARD   -Likely prerenal 2/2 hypovolemia vs intrinsic 2/2 ATN i/s/o of decompensated HF   -BUN 33, SCr 2.53   -per EMR, BUN 9 and SCr 0.93 in 12/21       Skin  -No active issues    ID  #Leukocytosis  -likely stress response i/s/o shock, less likely infectious in nature  -f/u BCx, UA   -CTM     #Endocrine  -f/u TSH     Hematologic/DVT ppx  -xarelto 10 for full AC given hx of PE     #Ethics MICU Accept Note    CHIEF COMPLAINT: Hypotension     HPI / INTERVAL HISTORY: 42F PMH PE on xarelto, pHTN, ILD, JULIA, right sided heart failure, presents to the hospital for 2 days of palpitations. Patient reports she has had similar episodes of palpitations in the past, however workup in the past was unremarkable. Patient also reporting mild orthopnea. Denies chest pain. Denies diaphoresis. Denies recent use of stimulants including caffeine. Patient also reporting severe nausea and vomiting which has resolved with zofran in the ED. ROS otherwise unremarkable.    ED course: VS with tachycardia. CBC with anemia. CMP normal. BNP 7646. Troponin T normal. EKG with sinus tachycardia, CXR without consolidation. CT chest noncon revealed pulmonary artery dilations likely secondary to pulmonary hypertension. Patient now for admission to medicine for further evaluation and treatment.     RRT called on 8/28 for hypotension (SBP 80s) w/ tachycardia to the 130s, s/p 50 cc of LR that was discontinued i/s/o significant pHTN. MICU consulted at this time. SBP improved to 100s. MICU did not accept at this time given improvement of SBP - recommend HF consult and echo given hx of pHTN w/ RV failure, RUQ US given concern for cholecystitis vs hepatic congestion, and cardio consult if chest pain worsens/recurs. On 8/29, patient had additional episode of hypotension (BP 80/41) w/ tachycardia to 108. Patient deemed candidate for MICU given concern for         PAST MEDICAL & SURGICAL HISTORY:  Tachycardia      Anemia      Pulmonary hypertension      Pulmonary embolism      Asthma  On home O2 nightly at 2L via NC      PE (pulmonary thromboembolism)  on Xarelto 10 mg daily      Sinusitis      Corneal disorder      Right heart failure      CAD (coronary artery disease)      H/O pulmonary hypertension      Pulmonary embolism      Asthma      History of tachycardia      On supplemental oxygen by nasal cannula  O2 @ 2L      Pacemaker      Skin mass  left upper thigh mass      History of tubal ligation      Pacemaker      H/O tubal ligation      History of pacemaker  12/19 - [employer] model Ingevity 4480          FAMILY HISTORY:  Family history of diabetes mellitus  in brother    Family history of diabetes mellitus in mother    FH: anemia        SOCIAL HISTORY:  Smoking:   Substance Use:   EtOH Use:   Marital Status:   Sexual History:   Occupation:  Recent Travel:  Country of Birth:   Advance Directives:     HOME MEDICATIONS:      Allergies    adhesives (Rash)  penicillin (Rash)  penicillin (Unknown)  vancomycin (Rash)  vancomycin (Unknown)    Intolerances    albuterol (Other; Rash)        REVIEW OF SYSTEMS:  Constitutional: No fevers, chills, weight loss, weight gain  HEENT: No vision problems, eye pain, nasal congestion, rhinorrhea, sore throat, dysphagia  CV: No chest pain, orthopnea, palpitations  Resp: No cough, dyspnea, wheezing, hemoptysis  GI: No nausea, vomiting, diarrhea, constipation, abdominal pain  : [ ] dysuria [ ] nocturia [ ] hematuria [ ] increased urinary frequency  Musculoskeletal: [ ] back pain [ ] myalgias [ ] arthralgias [ ] fracture  Skin: [ ] rash [ ] itch  Neurological: [ ] headache [ ] dizziness [ ] syncope [ ] weakness [ ] numbness  Psychiatric: [ ] anxiety [ ] depression  Endocrine: [ ] diabetes [ ] thyroid problem  Hematologic/Lymphatic: [ ] anemia [ ] bleeding problem  Allergic/Immunologic: [ ] itchy eyes [ ] nasal discharge [ ] hives [ ] angioedema  [ ] All other systems negative  [ ] Unable to assess ROS because ________    OBJECTIVE:  ICU Vital Signs Last 24 Hrs  T(C): 36.8 (29 Aug 2022 11:39), Max: 36.8 (29 Aug 2022 11:39)  T(F): 98.2 (29 Aug 2022 11:39), Max: 98.2 (29 Aug 2022 11:39)  HR: 102 (29 Aug 2022 11:39) (102 - 120)  BP: 113/60 (29 Aug 2022 11:39) (80/41 - 113/60)  BP(mean): --  ABP: --  ABP(mean): --  RR: 21 (29 Aug 2022 11:39) (20 - 22)  SpO2: 97% (29 Aug 2022 11:39) (94% - 97%)    O2 Parameters below as of 29 Aug 2022 11:39  Patient On (Oxygen Delivery Method): nasal cannula  O2 Flow (L/min): 4 08-29 @ 07:01  -  08-29 @ 16:27  --------------------------------------------------------  IN: 520 mL / OUT: 250 mL / NET: 270 mL      CAPILLARY BLOOD GLUCOSE      POCT Blood Glucose.: 115 mg/dL (28 Aug 2022 19:05)      PHYSICAL EXAM:  General:   HEENT:   Neck:   Chest/Lungs:  Heart:  Abdomen:   Extremities:   Skin:   Neuro:   Psych:     LINES:     HOSPITAL MEDICATIONS:  MEDICATIONS  (STANDING):  chlorhexidine 2% Cloths 1 Application(s) Topical <User Schedule>  cyanocobalamin 1000 MICROGram(s) Oral daily  furosemide    Tablet 20 milliGRAM(s) Oral <User Schedule>  ipratropium    for Nebulization 500 MICROGram(s) Nebulizer every 6 hours  pantoprazole    Tablet 40 milliGRAM(s) Oral before breakfast  Remodulin (Treprostinil) 5mG/mL 1 Vial(s) 1 Vial(s) SubCutaneous Continuous Pump  rivaroxaban 10 milliGRAM(s) Oral daily  spironolactone 25 milliGRAM(s) Oral daily  tiotropium 18 MICROgram(s) Capsule 1 Capsule(s) Inhalation daily    MEDICATIONS  (PRN):  acetaminophen     Tablet .. 650 milliGRAM(s) Oral every 6 hours PRN Temp greater or equal to 38C (100.4F), Mild Pain (1 - 3)  aluminum hydroxide/magnesium hydroxide/simethicone Suspension 30 milliLiter(s) Oral every 4 hours PRN Dyspepsia  artificial  tears Solution 1 Drop(s) Both EYES three times a day PRN Dry Eyes  melatonin 3 milliGRAM(s) Oral at bedtime PRN Insomnia  ondansetron Injectable 4 milliGRAM(s) IV Push every 8 hours PRN Nausea and/or Vomiting      LABS:                        14.9   12.83 )-----------( 410      ( 28 Aug 2022 19:33 )             46.9     Hgb Trend: 14.9<--, 14.9<--  08-29    132<L>  |  100  |  33<H>  ----------------------------<  117<H>  5.7<H>   |  13<L>  |  2.53<H>    Ca    8.9      29 Aug 2022 14:06  Phos  5.2     08-28  Mg     1.8     08-28    TPro  7.6  /  Alb  4.4  /  TBili  2.0<H>  /  DBili  x   /  AST  1025<H>  /  ALT  1050<H>  /  AlkPhos  76  08-29    Creatinine Trend: 2.53<--, 1.48<--, 1.21<--  PT/INR - ( 28 Aug 2022 19:33 )   PT: 26.5 sec;   INR: 2.29 ratio         PTT - ( 28 Aug 2022 19:33 )  PTT:36.7 sec    Arterial Blood Gas:  08-28 @ 19:31  7.37/23/94/13/98.5/-9.7  ABG lactate: --    Venous Blood Gas:  08-28 @ 11:23  7.32/42/24/22/24.8  VBG Lactate: 2.7      RADIOLOGY & ADDITIONAL TESTS:      ACC: [id number] EXAM:  XR CHEST PORTABLE URGENT 1V                          PROCEDURE DATE:  08/28/2022          INTERPRETATION:  HISTORY: Chest pain. Subjective fevers.    TECHNIQUE: A single portable view of the chest was obtained.    COMPARISON: CT scan dated 6/15/2022 and chest radiograph dated 12/14/2021    FINDINGS:  The cardiac silhouette is normal in size. There is a   left-sided dual-lead pacemaker. There are no focal consolidations or   pleural effusions. There are enlarged pulmonary arteries, unchanged. The   osseous structures are intact.    IMPRESSION: Stable enlarged pulmonary arteries. No focal consolidation is   seen.    --- End of Report ---            AVNI MIRANDA MD; Attending Radiologist  This document has been electronically signed. Aug 28 2022  1:18PM      ASSESSMENT AND PLAN:  Pt is a 43 yo F w/ PMHx of PE on xarelto, pHTN (class 1 per [employer]), ILD, JULIA, right sided heart failure admitted to [facility] for evaluation of palpitations/chest pain, s/p RRT for hypotension (SBP 80s) w/ tachycardia to 130s, now being transferred to MICU for persistent hypotension w/ concern for     Neuro  -AAAOx3  -No active issues at this time     Cardiovascular  #      #Left heart failure?  -echo  -furosemide 40mg IV BID   -strict I+Os  -fluid restriction     #Cor Pulmonale   -RHF likely 2/2 severe pHTN  -Follows-up outpatient w/ Dr. Yoon for pHTN  -Class 1 pHTN  -hold sildenafil i/s/o hypotension   -c/w atrovent     Respiratory  #Tachypnea  -CXR on shows enlarged pulmonary arteries, no pleural effusions or consolidations noted  -ABG w/ pH 7.37, pCO2 23, pO2 94, HCO3 12, O2 sat 98.5%   -on 4L NC, saturating appropriately  -CTM     GI/Nutrition  #RUQ discomfort  -likely cholecystitis vs hepatic congestion vs ischemic hepatitis   -AST/ALT elevated at 1025 and 1050, respectively   -Alk Phosph wnl   -f/u RUQ US  -f/u lipase       /Renal  #EDOUARD   -Likely prerenal 2/2 hypovolemia vs intrinsic 2/2 ATN i/s/o of decompensated HF   -BUN 33, SCr 2.53   -per EMR, BUN 9 and SCr 0.93 in 12/21       Skin  -No active issues    ID  #Leukocytosis    -f/u BCx, UA   -CTM     #Endocrine  -f/u TSH     Hematologic/DVT ppx  -xarelto 10 for full AC given hx of PE     #Ethics MICU Accept Note    CHIEF COMPLAINT: Hypotension     HPI / INTERVAL HISTORY: 42F PMH PE on xarelto, pHTN, ILD, JULIA, right sided heart failure, presents to the hospital for 2 days of palpitations. Patient reports she has had similar episodes of palpitations in the past, however workup in the past was unremarkable. Patient also reporting mild orthopnea. Denies chest pain. Denies diaphoresis. Denies recent use of stimulants including caffeine. Patient also reporting severe nausea and vomiting which has resolved with zofran in the ED. ROS otherwise unremarkable.    ED course: VS with tachycardia. CBC with anemia. CMP normal. BNP 7646. Troponin T normal. EKG with sinus tachycardia, CXR without consolidation. CT chest noncon revealed pulmonary artery dilations likely secondary to pulmonary hypertension. Patient now for admission to medicine for further evaluation and treatment.     RRT called on 8/28 for hypotension (SBP 80s) w/ tachycardia to the 130s, s/p 50 cc of LR that was discontinued i/s/o significant pHTN. MICU consulted at this time. SBP improved to 100s. MICU did not accept at this time given improvement of SBP - recommend HF consult and echo given hx of pHTN w/ RV failure, RUQ US given concern for cholecystitis vs hepatic congestion, and cardio consult if chest pain worsens/recurs. On 8/29, patient had additional episode of hypotension (BP 80/41) w/ tachycardia to 108. Patient deemed candidate for MICU given concern for         PAST MEDICAL & SURGICAL HISTORY:  Tachycardia      Anemia      Pulmonary hypertension      Pulmonary embolism      Asthma  On home O2 nightly at 2L via NC      PE (pulmonary thromboembolism)  on Xarelto 10 mg daily      Sinusitis      Corneal disorder      Right heart failure      CAD (coronary artery disease)      H/O pulmonary hypertension      Pulmonary embolism      Asthma      History of tachycardia      On supplemental oxygen by nasal cannula  O2 @ 2L      Pacemaker      Skin mass  left upper thigh mass      History of tubal ligation      Pacemaker      H/O tubal ligation      History of pacemaker  12/19 - [employer] model Ingevity 4408          FAMILY HISTORY:  Family history of diabetes mellitus  in brother    Family history of diabetes mellitus in mother    FH: anemia        SOCIAL HISTORY:  Smoking:   Substance Use:   EtOH Use:   Marital Status:   Sexual History:   Occupation:  Recent Travel:  Country of Birth:   Advance Directives:     HOME MEDICATIONS:      Allergies    adhesives (Rash)  penicillin (Rash)  penicillin (Unknown)  vancomycin (Rash)  vancomycin (Unknown)    Intolerances    albuterol (Other; Rash)        REVIEW OF SYSTEMS:  Constitutional: No fevers, chills, weight loss, weight gain  HEENT: No vision problems, eye pain, nasal congestion, rhinorrhea, sore throat, dysphagia  CV: No chest pain, orthopnea, palpitations  Resp: No cough, dyspnea, wheezing, hemoptysis  GI: No nausea, vomiting, diarrhea, constipation, abdominal pain  : [ ] dysuria [ ] nocturia [ ] hematuria [ ] increased urinary frequency  Musculoskeletal: [ ] back pain [ ] myalgias [ ] arthralgias [ ] fracture  Skin: [ ] rash [ ] itch  Neurological: [ ] headache [ ] dizziness [ ] syncope [ ] weakness [ ] numbness  Psychiatric: [ ] anxiety [ ] depression  Endocrine: [ ] diabetes [ ] thyroid problem  Hematologic/Lymphatic: [ ] anemia [ ] bleeding problem  Allergic/Immunologic: [ ] itchy eyes [ ] nasal discharge [ ] hives [ ] angioedema  [ ] All other systems negative  [ ] Unable to assess ROS because ________    OBJECTIVE:  ICU Vital Signs Last 24 Hrs  T(C): 36.8 (29 Aug 2022 11:39), Max: 36.8 (29 Aug 2022 11:39)  T(F): 98.2 (29 Aug 2022 11:39), Max: 98.2 (29 Aug 2022 11:39)  HR: 102 (29 Aug 2022 11:39) (102 - 120)  BP: 113/60 (29 Aug 2022 11:39) (80/41 - 113/60)  BP(mean): --  ABP: --  ABP(mean): --  RR: 21 (29 Aug 2022 11:39) (20 - 22)  SpO2: 97% (29 Aug 2022 11:39) (94% - 97%)    O2 Parameters below as of 29 Aug 2022 11:39  Patient On (Oxygen Delivery Method): nasal cannula  O2 Flow (L/min): 4 08-29 @ 07:01  -  08-29 @ 16:27  --------------------------------------------------------  IN: 520 mL / OUT: 250 mL / NET: 270 mL      CAPILLARY BLOOD GLUCOSE      POCT Blood Glucose.: 115 mg/dL (28 Aug 2022 19:05)      PHYSICAL EXAM:  General:   HEENT:   Neck:   Chest/Lungs:  Heart:  Abdomen:   Extremities:   Skin:   Neuro:   Psych:     LINES:     HOSPITAL MEDICATIONS:  MEDICATIONS  (STANDING):  chlorhexidine 2% Cloths 1 Application(s) Topical <User Schedule>  cyanocobalamin 1000 MICROGram(s) Oral daily  furosemide    Tablet 20 milliGRAM(s) Oral <User Schedule>  ipratropium    for Nebulization 500 MICROGram(s) Nebulizer every 6 hours  pantoprazole    Tablet 40 milliGRAM(s) Oral before breakfast  Remodulin (Treprostinil) 5mG/mL 1 Vial(s) 1 Vial(s) SubCutaneous Continuous Pump  rivaroxaban 10 milliGRAM(s) Oral daily  spironolactone 25 milliGRAM(s) Oral daily  tiotropium 18 MICROgram(s) Capsule 1 Capsule(s) Inhalation daily    MEDICATIONS  (PRN):  acetaminophen     Tablet .. 650 milliGRAM(s) Oral every 6 hours PRN Temp greater or equal to 38C (100.4F), Mild Pain (1 - 3)  aluminum hydroxide/magnesium hydroxide/simethicone Suspension 30 milliLiter(s) Oral every 4 hours PRN Dyspepsia  artificial  tears Solution 1 Drop(s) Both EYES three times a day PRN Dry Eyes  melatonin 3 milliGRAM(s) Oral at bedtime PRN Insomnia  ondansetron Injectable 4 milliGRAM(s) IV Push every 8 hours PRN Nausea and/or Vomiting      LABS:                        14.9   12.83 )-----------( 410      ( 28 Aug 2022 19:33 )             46.9     Hgb Trend: 14.9<--, 14.9<--  08-29    132<L>  |  100  |  33<H>  ----------------------------<  117<H>  5.7<H>   |  13<L>  |  2.53<H>    Ca    8.9      29 Aug 2022 14:06  Phos  5.2     08-28  Mg     1.8     08-28    TPro  7.6  /  Alb  4.4  /  TBili  2.0<H>  /  DBili  x   /  AST  1025<H>  /  ALT  1050<H>  /  AlkPhos  76  08-29    Creatinine Trend: 2.53<--, 1.48<--, 1.21<--  PT/INR - ( 28 Aug 2022 19:33 )   PT: 26.5 sec;   INR: 2.29 ratio         PTT - ( 28 Aug 2022 19:33 )  PTT:36.7 sec    Arterial Blood Gas:  08-28 @ 19:31  7.37/23/94/13/98.5/-9.7  ABG lactate: --    Venous Blood Gas:  08-28 @ 11:23  7.32/42/24/22/24.8  VBG Lactate: 2.7      RADIOLOGY & ADDITIONAL TESTS:      ACC: [id number] EXAM:  XR CHEST PORTABLE URGENT 1V                          PROCEDURE DATE:  08/28/2022          INTERPRETATION:  HISTORY: Chest pain. Subjective fevers.    TECHNIQUE: A single portable view of the chest was obtained.    COMPARISON: CT scan dated 6/15/2022 and chest radiograph dated 12/14/2021    FINDINGS:  The cardiac silhouette is normal in size. There is a   left-sided dual-lead pacemaker. There are no focal consolidations or   pleural effusions. There are enlarged pulmonary arteries, unchanged. The   osseous structures are intact.    IMPRESSION: Stable enlarged pulmonary arteries. No focal consolidation is   seen.    --- End of Report ---            AVNI MIRANDA MD; Attending Radiologist  This document has been electronically signed. Aug 28 2022  1:18PM      ASSESSMENT AND PLAN:  Pt is a 41 yo F w/ PMHx of PE on xarelto, pHTN (class 1 per [employer]), ILD, JULIA, right sided heart failure admitted to [facility] for evaluation of palpitations/chest pain, s/p RRT for hypotension (SBP 80s) w/ tachycardia to 130s, now being transferred to MICU for persistent hypotension w/ concern for     Neuro  -AAAOx3  -No active issues at this time     Cardiovascular  #Septic shock               #Cor Pulmonale   -RHF likely 2/2 severe pHTN  -Follows-up outpatient w/ Dr. Yoon for pHTN  -Class 1 pHTN  -c/w sildenafil   -start flolan?  -echo  -furosemide 40mg IV BID   -strict I+Os  -fluid restriction     Respiratory  #Tachypnea  -CXR on shows enlarged pulmonary arteries, no pleural effusions or consolidations noted  -ABG w/ pH 7.37, pCO2 23, pO2 94, HCO3 12, O2 sat 98.5%   -on 4L NC, saturating appropriately  -CTM     GI/Nutrition  #RUQ discomfort  -likely cholecystitis vs hepatic congestion vs ischemic hepatitis   -AST/ALT elevated at 1025 and 1050, respectively   -Alk Phosph wnl   -f/u RUQ US  -f/u lipase       /Renal  #EDOUARD   -Likely prerenal 2/2 hypovolemia vs intrinsic 2/2 ATN i/s/o shock   -BUN 33, SCr 2.53   -per EMR, BUN 9 and SCr 0.93 in 12/21       Skin  -No active issues    ID  #Leukocytosis  -f/u BCx, UA   -CTM     #Endocrine  -f/u TSH     Hematologic/DVT ppx  -xarelto 10 for full AC given hx of PE     #Ethics MICU Accept Note    CHIEF COMPLAINT: Hypotension     HPI / INTERVAL HISTORY: 42F PMH PE on xarelto, pHTN, ILD, JULIA, right sided heart failure, presents to the hospital for 2 days of palpitations. Patient reports she has had similar episodes of palpitations in the past, however workup in the past was unremarkable. Patient also reporting mild orthopnea. Denies chest pain. Denies diaphoresis. Denies recent use of stimulants including caffeine. Patient also reporting severe nausea and vomiting which has resolved with zofran in the ED. ROS otherwise unremarkable.    ED course: VS with tachycardia. CBC with anemia. CMP normal. BNP 7646. Troponin T normal. EKG with sinus tachycardia, CXR without consolidation. CT chest noncon revealed pulmonary artery dilations likely secondary to pulmonary hypertension. Patient now for admission to medicine for further evaluation and treatment.     RRT called on 8/28 for hypotension (SBP 80s) w/ tachycardia to the 130s, s/p 50 cc of LR that was discontinued i/s/o significant pHTN. MICU consulted at this time. SBP improved to 100s. MICU did not accept at this time given improvement of SBP - recommend HF consult and echo given hx of pHTN w/ RV failure, RUQ US given concern for cholecystitis vs hepatic congestion, and cardio consult if chest pain worsens/recurs. On 8/29, patient had additional episode of hypotension (BP 80/41) w/ tachycardia to 108. Patient deemed candidate for MICU given hemodynamic instability w/ signs of end-organ dysfunction and concern for septic shock vs cardiogenic shock.         PAST MEDICAL & SURGICAL HISTORY:  Tachycardia      Anemia      Pulmonary hypertension      Pulmonary embolism      Asthma  On home O2 nightly at 2L via NC      PE (pulmonary thromboembolism)  on Xarelto 10 mg daily      Sinusitis      Corneal disorder      Right heart failure      CAD (coronary artery disease)      H/O pulmonary hypertension      Pulmonary embolism      Asthma      History of tachycardia      On supplemental oxygen by nasal cannula  O2 @ 2L      Pacemaker      Skin mass  left upper thigh mass      History of tubal ligation      Pacemaker      H/O tubal ligation      History of pacemaker  12/19 - [employer] model Ingevity 4494          FAMILY HISTORY:  Family history of diabetes mellitus  in brother    Family history of diabetes mellitus in mother    FH: anemia        SOCIAL HISTORY:  Smoking:   Substance Use:   EtOH Use:   Marital Status:   Sexual History:   Occupation:  Recent Travel:  Country of Birth:   Advance Directives:     HOME MEDICATIONS:      Allergies    adhesives (Rash)  penicillin (Rash)  penicillin (Unknown)  vancomycin (Rash)  vancomycin (Unknown)    Intolerances    albuterol (Other; Rash)        REVIEW OF SYSTEMS:  Constitutional: No fevers, chills, weight loss, weight gain  HEENT: No vision problems, eye pain, nasal congestion, rhinorrhea, sore throat, dysphagia  CV: No chest pain, orthopnea, palpitations  Resp: No cough, dyspnea, wheezing, hemoptysis  GI: No nausea, vomiting, diarrhea, constipation, abdominal pain  : [ ] dysuria [ ] nocturia [ ] hematuria [ ] increased urinary frequency  Musculoskeletal: [ ] back pain [ ] myalgias [ ] arthralgias [ ] fracture  Skin: [ ] rash [ ] itch  Neurological: [ ] headache [ ] dizziness [ ] syncope [ ] weakness [ ] numbness  Psychiatric: [ ] anxiety [ ] depression  Endocrine: [ ] diabetes [ ] thyroid problem  Hematologic/Lymphatic: [ ] anemia [ ] bleeding problem  Allergic/Immunologic: [ ] itchy eyes [ ] nasal discharge [ ] hives [ ] angioedema  [ ] All other systems negative  [ ] Unable to assess ROS because ________    OBJECTIVE:  ICU Vital Signs Last 24 Hrs  T(C): 36.8 (29 Aug 2022 11:39), Max: 36.8 (29 Aug 2022 11:39)  T(F): 98.2 (29 Aug 2022 11:39), Max: 98.2 (29 Aug 2022 11:39)  HR: 102 (29 Aug 2022 11:39) (102 - 120)  BP: 113/60 (29 Aug 2022 11:39) (80/41 - 113/60)  BP(mean): --  ABP: --  ABP(mean): --  RR: 21 (29 Aug 2022 11:39) (20 - 22)  SpO2: 97% (29 Aug 2022 11:39) (94% - 97%)    O2 Parameters below as of 29 Aug 2022 11:39  Patient On (Oxygen Delivery Method): nasal cannula  O2 Flow (L/min): 4 08-29 @ 07:01  -  08-29 @ 16:27  --------------------------------------------------------  IN: 520 mL / OUT: 250 mL / NET: 270 mL      CAPILLARY BLOOD GLUCOSE      POCT Blood Glucose.: 115 mg/dL (28 Aug 2022 19:05)      PHYSICAL EXAM:  General:   HEENT:   Neck:   Chest/Lungs:  Heart:  Abdomen:   Extremities:   Skin:   Neuro:   Psych:     LINES:     HOSPITAL MEDICATIONS:  MEDICATIONS  (STANDING):  chlorhexidine 2% Cloths 1 Application(s) Topical <User Schedule>  cyanocobalamin 1000 MICROGram(s) Oral daily  furosemide    Tablet 20 milliGRAM(s) Oral <User Schedule>  ipratropium    for Nebulization 500 MICROGram(s) Nebulizer every 6 hours  pantoprazole    Tablet 40 milliGRAM(s) Oral before breakfast  Remodulin (Treprostinil) 5mG/mL 1 Vial(s) 1 Vial(s) SubCutaneous Continuous Pump  rivaroxaban 10 milliGRAM(s) Oral daily  spironolactone 25 milliGRAM(s) Oral daily  tiotropium 18 MICROgram(s) Capsule 1 Capsule(s) Inhalation daily    MEDICATIONS  (PRN):  acetaminophen     Tablet .. 650 milliGRAM(s) Oral every 6 hours PRN Temp greater or equal to 38C (100.4F), Mild Pain (1 - 3)  aluminum hydroxide/magnesium hydroxide/simethicone Suspension 30 milliLiter(s) Oral every 4 hours PRN Dyspepsia  artificial  tears Solution 1 Drop(s) Both EYES three times a day PRN Dry Eyes  melatonin 3 milliGRAM(s) Oral at bedtime PRN Insomnia  ondansetron Injectable 4 milliGRAM(s) IV Push every 8 hours PRN Nausea and/or Vomiting      LABS:                        14.9   12.83 )-----------( 410      ( 28 Aug 2022 19:33 )             46.9     Hgb Trend: 14.9<--, 14.9<--  08-29    132<L>  |  100  |  33<H>  ----------------------------<  117<H>  5.7<H>   |  13<L>  |  2.53<H>    Ca    8.9      29 Aug 2022 14:06  Phos  5.2     08-28  Mg     1.8     08-28    TPro  7.6  /  Alb  4.4  /  TBili  2.0<H>  /  DBili  x   /  AST  1025<H>  /  ALT  1050<H>  /  AlkPhos  76  08-29    Creatinine Trend: 2.53<--, 1.48<--, 1.21<--  PT/INR - ( 28 Aug 2022 19:33 )   PT: 26.5 sec;   INR: 2.29 ratio         PTT - ( 28 Aug 2022 19:33 )  PTT:36.7 sec    Arterial Blood Gas:  08-28 @ 19:31  7.37/23/94/13/98.5/-9.7  ABG lactate: --    Venous Blood Gas:  08-28 @ 11:23  7.32/42/24/22/24.8  VBG Lactate: 2.7      RADIOLOGY & ADDITIONAL TESTS:      ACC: [id number] EXAM:  XR CHEST PORTABLE URGENT 1V                          PROCEDURE DATE:  08/28/2022          INTERPRETATION:  HISTORY: Chest pain. Subjective fevers.    TECHNIQUE: A single portable view of the chest was obtained.    COMPARISON: CT scan dated 6/15/2022 and chest radiograph dated 12/14/2021    FINDINGS:  The cardiac silhouette is normal in size. There is a   left-sided dual-lead pacemaker. There are no focal consolidations or   pleural effusions. There are enlarged pulmonary arteries, unchanged. The   osseous structures are intact.    IMPRESSION: Stable enlarged pulmonary arteries. No focal consolidation is   seen.    --- End of Report ---            AVNI MIRANDA MD; Attending Radiologist  This document has been electronically signed. Aug 28 2022  1:18PM      ASSESSMENT AND PLAN:  Pt is a 43 yo F w/ PMHx of PE on xarelto, pHTN (class 1 per [employer]), ILD, JULIA, right sided heart failure admitted to [facility] for evaluation of palpitations/chest pain, s/p RRT for hypotension (SBP 80s) w/ tachycardia to 130s, now being transferred to MICU for persistent hemodynanmic instability w/ concern for septic shock vs cardiogenic shock.     Neuro  -AAAOx3  -No active issues at this time     Cardiovascular  #Septic shock               #Cor Pulmonale   -RHF likely 2/2 severe pHTN  -Follows-up outpatient w/ Dr. Yoon for pHTN  -Class 1 pHTN  -c/w sildenafil   -start flolan?  -echo  -furosemide 40mg IV BID   -strict I+Os  -fluid restriction     Respiratory  #Tachypnea  -CXR on shows enlarged pulmonary arteries, no pleural effusions or consolidations noted  -ABG w/ pH 7.37, pCO2 23, pO2 94, HCO3 12, O2 sat 98.5%   -on 4L NC, saturating appropriately  -CTM     GI/Nutrition  #RUQ discomfort  -likely cholecystitis vs hepatic congestion vs ischemic hepatitis   -AST/ALT elevated at 1025 and 1050, respectively   -Alk Phosph wnl   -f/u RUQ US  -f/u lipase       /Renal  #EDOUARD   -Likely prerenal 2/2 hypovolemia vs intrinsic 2/2 ATN i/s/o shock   -BUN 33, SCr 2.53   -per EMR, BUN 9 and SCr 0.93 in 12/21       Skin  -No active issues    ID  #Leukocytosis  -f/u BCx, UA   -CTM     #Endocrine  -f/u TSH     Hematologic/DVT ppx  -xarelto 10 for full AC given hx of PE     #Ethics MICU Accept Note    CHIEF COMPLAINT: Hypotension     HPI / INTERVAL HISTORY: 42F PMH PE on xarelto, pHTN, ILD, JULIA, right sided heart failure, presents to the hospital for 2 days of palpitations. Patient reports she has had similar episodes of palpitations in the past, however workup in the past was unremarkable. Patient also reporting mild orthopnea. Denies chest pain. Denies diaphoresis. Denies recent use of stimulants including caffeine. Patient also reporting severe nausea and vomiting which has resolved with zofran in the ED. ROS otherwise unremarkable.    ED course: VS with tachycardia. CBC with anemia. CMP normal. BNP 7646. Troponin T normal. EKG with sinus tachycardia, CXR without consolidation. CT chest noncon revealed pulmonary artery dilations likely secondary to pulmonary hypertension. Patient now for admission to medicine for further evaluation and treatment.     RRT called on 8/28 for hypotension (SBP 80s) w/ tachycardia to the 130s, s/p 50 cc of LR that was discontinued i/s/o significant pHTN. MICU consulted at this time. SBP improved to 100s. MICU did not accept at this time given improvement of SBP - recommend HF consult and echo given hx of pHTN w/ RV failure, RUQ US given concern for cholecystitis vs hepatic congestion, and cardio consult if chest pain worsens/recurs. On 8/29, patient had additional episode of hypotension (BP 80/41) w/ tachycardia to 108. Patient deemed candidate for MICU given hemodynamic instability w/ signs of end-organ dysfunction and concern for septic shock vs cardiogenic shock.         PAST MEDICAL & SURGICAL HISTORY:  Tachycardia      Anemia      Pulmonary hypertension      Pulmonary embolism      Asthma  On home O2 nightly at 2L via NC      PE (pulmonary thromboembolism)  on Xarelto 10 mg daily      Sinusitis      Corneal disorder      Right heart failure      CAD (coronary artery disease)      H/O pulmonary hypertension      Pulmonary embolism      Asthma      History of tachycardia      On supplemental oxygen by nasal cannula  O2 @ 2L      Pacemaker      Skin mass  left upper thigh mass      History of tubal ligation      Pacemaker      H/O tubal ligation      History of pacemaker  12/19 - [employer] model Ingevity 4479          FAMILY HISTORY:  Family history of diabetes mellitus  in brother    Family history of diabetes mellitus in mother    FH: anemia        SOCIAL HISTORY:  Smoking:   Substance Use:   EtOH Use:   Marital Status:   Sexual History:   Occupation:  Recent Travel:  Country of Birth:   Advance Directives:     HOME MEDICATIONS:      Allergies    adhesives (Rash)  penicillin (Rash)  penicillin (Unknown)  vancomycin (Rash)  vancomycin (Unknown)    Intolerances    albuterol (Other; Rash)        REVIEW OF SYSTEMS:  Constitutional: No fevers, chills, weight loss, weight gain  HEENT: No vision problems, eye pain, nasal congestion, rhinorrhea, sore throat, dysphagia  CV: No chest pain, orthopnea, palpitations  Resp: No cough, dyspnea, wheezing, hemoptysis  GI: No nausea, vomiting, diarrhea, constipation, abdominal pain  : [ ] dysuria [ ] nocturia [ ] hematuria [ ] increased urinary frequency  Musculoskeletal: [ ] back pain [ ] myalgias [ ] arthralgias [ ] fracture  Skin: [ ] rash [ ] itch  Neurological: [ ] headache [ ] dizziness [ ] syncope [ ] weakness [ ] numbness  Psychiatric: [ ] anxiety [ ] depression  Endocrine: [ ] diabetes [ ] thyroid problem  Hematologic/Lymphatic: [ ] anemia [ ] bleeding problem  Allergic/Immunologic: [ ] itchy eyes [ ] nasal discharge [ ] hives [ ] angioedema  [ ] All other systems negative  [ ] Unable to assess ROS because ________    OBJECTIVE:  ICU Vital Signs Last 24 Hrs  T(C): 36.8 (29 Aug 2022 11:39), Max: 36.8 (29 Aug 2022 11:39)  T(F): 98.2 (29 Aug 2022 11:39), Max: 98.2 (29 Aug 2022 11:39)  HR: 102 (29 Aug 2022 11:39) (102 - 120)  BP: 113/60 (29 Aug 2022 11:39) (80/41 - 113/60)  BP(mean): --  ABP: --  ABP(mean): --  RR: 21 (29 Aug 2022 11:39) (20 - 22)  SpO2: 97% (29 Aug 2022 11:39) (94% - 97%)    O2 Parameters below as of 29 Aug 2022 11:39  Patient On (Oxygen Delivery Method): nasal cannula  O2 Flow (L/min): 4 08-29 @ 07:01  -  08-29 @ 16:27  --------------------------------------------------------  IN: 520 mL / OUT: 250 mL / NET: 270 mL      CAPILLARY BLOOD GLUCOSE      POCT Blood Glucose.: 115 mg/dL (28 Aug 2022 19:05)      PHYSICAL EXAM:  General:   HEENT:   Neck:   Chest/Lungs:  Heart:  Abdomen:   Extremities:   Skin:   Neuro:   Psych:     LINES:     HOSPITAL MEDICATIONS:  MEDICATIONS  (STANDING):  chlorhexidine 2% Cloths 1 Application(s) Topical <User Schedule>  cyanocobalamin 1000 MICROGram(s) Oral daily  furosemide    Tablet 20 milliGRAM(s) Oral <User Schedule>  ipratropium    for Nebulization 500 MICROGram(s) Nebulizer every 6 hours  pantoprazole    Tablet 40 milliGRAM(s) Oral before breakfast  Remodulin (Treprostinil) 5mG/mL 1 Vial(s) 1 Vial(s) SubCutaneous Continuous Pump  rivaroxaban 10 milliGRAM(s) Oral daily  spironolactone 25 milliGRAM(s) Oral daily  tiotropium 18 MICROgram(s) Capsule 1 Capsule(s) Inhalation daily    MEDICATIONS  (PRN):  acetaminophen     Tablet .. 650 milliGRAM(s) Oral every 6 hours PRN Temp greater or equal to 38C (100.4F), Mild Pain (1 - 3)  aluminum hydroxide/magnesium hydroxide/simethicone Suspension 30 milliLiter(s) Oral every 4 hours PRN Dyspepsia  artificial  tears Solution 1 Drop(s) Both EYES three times a day PRN Dry Eyes  melatonin 3 milliGRAM(s) Oral at bedtime PRN Insomnia  ondansetron Injectable 4 milliGRAM(s) IV Push every 8 hours PRN Nausea and/or Vomiting      LABS:                        14.9   12.83 )-----------( 410      ( 28 Aug 2022 19:33 )             46.9     Hgb Trend: 14.9<--, 14.9<--  08-29    132<L>  |  100  |  33<H>  ----------------------------<  117<H>  5.7<H>   |  13<L>  |  2.53<H>    Ca    8.9      29 Aug 2022 14:06  Phos  5.2     08-28  Mg     1.8     08-28    TPro  7.6  /  Alb  4.4  /  TBili  2.0<H>  /  DBili  x   /  AST  1025<H>  /  ALT  1050<H>  /  AlkPhos  76  08-29    Creatinine Trend: 2.53<--, 1.48<--, 1.21<--  PT/INR - ( 28 Aug 2022 19:33 )   PT: 26.5 sec;   INR: 2.29 ratio         PTT - ( 28 Aug 2022 19:33 )  PTT:36.7 sec    Arterial Blood Gas:  08-28 @ 19:31  7.37/23/94/13/98.5/-9.7  ABG lactate: --    Venous Blood Gas:  08-28 @ 11:23  7.32/42/24/22/24.8  VBG Lactate: 2.7      RADIOLOGY & ADDITIONAL TESTS:      ACC: [id number] EXAM:  XR CHEST PORTABLE URGENT 1V                          PROCEDURE DATE:  08/28/2022          INTERPRETATION:  HISTORY: Chest pain. Subjective fevers.    TECHNIQUE: A single portable view of the chest was obtained.    COMPARISON: CT scan dated 6/15/2022 and chest radiograph dated 12/14/2021    FINDINGS:  The cardiac silhouette is normal in size. There is a   left-sided dual-lead pacemaker. There are no focal consolidations or   pleural effusions. There are enlarged pulmonary arteries, unchanged. The   osseous structures are intact.    IMPRESSION: Stable enlarged pulmonary arteries. No focal consolidation is   seen.    --- End of Report ---            AVNI MIRANDA MD; Attending Radiologist  This document has been electronically signed. Aug 28 2022  1:18PM      ASSESSMENT AND PLAN:  Pt is a 41 yo F w/ PMHx of PE on xarelto, pHTN (class 1 per [employer]), ILD, JULIA, right sided heart failure admitted to [facility] for evaluation of palpitations/chest pain, s/p RRT for hypotension (SBP 80s) w/ tachycardia to 130s, now being transferred to MICU for persistent hemodynanmic instability w/ concern for septic shock vs cardiogenic shock.     Neuro  -AAAOx3  -No active issues at this time     Cardiovascular  #Shock  -likely septic shock vs cardiogenic shock, possible mixed etiology      #Cor Pulmonale   -RHF likely 2/2 severe pHTN  -Follows-up outpatient w/ Dr. Yoon for pHTN  -Class 1 pHTN  -c/w sildenafil   -start flolan?  -echo  -furosemide 40mg IV BID       Respiratory  #Tachypnea  -CXR on shows enlarged pulmonary arteries, no pleural effusions or consolidations noted  -ABG w/ pH 7.37, pCO2 23, pO2 94, HCO3 12, O2 sat 98.5%   -on 4L NC, saturating appropriately  -CTM     GI/Nutrition  #RUQ discomfort  -likely cholecystitis vs hepatic congestion vs ischemic hepatitis   -AST/ALT elevated at 1025 and 1050, respectively   -Alk Phosph wnl   -f/u RUQ US  -f/u lipase       /Renal  #EDOUARD   -Likely prerenal 2/2 hypovolemia vs intrinsic 2/2 ATN i/s/o shock   -BUN 33, SCr 2.53   -per EMR, BUN 9 and SCr 0.93 in 12/21       Skin  -No active issues    ID  #Leukocytosis  -concern for septic shock   -f/u BCx, UA   -abx?    #Endocrine  -f/u TSH     Hematologic/DVT ppx  -xarelto 10 for full AC given hx of PE     #Ethics  -full code MICU Accept Note    CHIEF COMPLAINT: Hypotension     HPI / INTERVAL HISTORY: 42F PMH PE on xarelto, pHTN, ILD, JULIA, right sided heart failure, presents to the hospital for 2 days of palpitations. Patient reports she has had similar episodes of palpitations in the past, however workup in the past was unremarkable. Patient also reporting mild orthopnea. Denies chest pain. Denies diaphoresis. Denies recent use of stimulants including caffeine. Patient also reporting severe nausea and vomiting which has resolved with zofran in the ED. ROS otherwise unremarkable.    ED course: VS with tachycardia. CBC with anemia. CMP normal. BNP 7646. Troponin T normal. EKG with sinus tachycardia, CXR without consolidation. CT chest noncon revealed pulmonary artery dilations likely secondary to pulmonary hypertension. Patient now for admission to medicine for further evaluation and treatment.     RRT called on 8/28 for hypotension (SBP 80s) w/ tachycardia to the 130s, s/p 50 cc of LR that was discontinued i/s/o significant pHTN. MICU consulted at this time. SBP improved to 100s. MICU did not accept at this time given improvement of SBP - recommend HF consult and echo given hx of pHTN w/ RV failure, RUQ US given concern for cholecystitis vs hepatic congestion, and cardio consult if chest pain worsens/recurs. On 8/29, patient had additional episode of hypotension (BP 80/41) w/ tachycardia to 108. Patient deemed candidate for MICU given hemodynamic instability w/ signs of end-organ dysfunction and concern for septic shock vs cardiogenic shock.         PAST MEDICAL & SURGICAL HISTORY:  Tachycardia      Anemia      Pulmonary hypertension      Pulmonary embolism      Asthma  On home O2 nightly at 2L via NC      PE (pulmonary thromboembolism)  on Xarelto 10 mg daily      Sinusitis      Corneal disorder      Right heart failure      CAD (coronary artery disease)      H/O pulmonary hypertension      Pulmonary embolism      Asthma      History of tachycardia      On supplemental oxygen by nasal cannula  O2 @ 2L      Pacemaker      Skin mass  left upper thigh mass      History of tubal ligation      Pacemaker      H/O tubal ligation      History of pacemaker  12/19 - [employer] model Ingevity 4452          FAMILY HISTORY:  Family history of diabetes mellitus  in brother    Family history of diabetes mellitus in mother    FH: anemia        SOCIAL HISTORY:  Smoking:   Substance Use:   EtOH Use:   Marital Status:   Sexual History:   Occupation:  Recent Travel:  Country of Birth:   Advance Directives:     HOME MEDICATIONS:      Allergies    adhesives (Rash)  penicillin (Rash)  penicillin (Unknown)  vancomycin (Rash)  vancomycin (Unknown)    Intolerances    albuterol (Other; Rash)        REVIEW OF SYSTEMS:  Constitutional: No fevers, chills, weight loss, weight gain  HEENT: No vision problems, eye pain, nasal congestion, rhinorrhea, sore throat, dysphagia  CV: No chest pain, orthopnea, palpitations  Resp: No cough, dyspnea, wheezing, hemoptysis  GI: No nausea, vomiting, diarrhea, constipation, abdominal pain  : [ ] dysuria [ ] nocturia [ ] hematuria [ ] increased urinary frequency  Musculoskeletal: [ ] back pain [ ] myalgias [ ] arthralgias [ ] fracture  Skin: [ ] rash [ ] itch  Neurological: [ ] headache [ ] dizziness [ ] syncope [ ] weakness [ ] numbness  Psychiatric: [ ] anxiety [ ] depression  Endocrine: [ ] diabetes [ ] thyroid problem  Hematologic/Lymphatic: [ ] anemia [ ] bleeding problem  Allergic/Immunologic: [ ] itchy eyes [ ] nasal discharge [ ] hives [ ] angioedema  [ ] All other systems negative  [ ] Unable to assess ROS because ________    OBJECTIVE:  ICU Vital Signs Last 24 Hrs  T(C): 36.8 (29 Aug 2022 11:39), Max: 36.8 (29 Aug 2022 11:39)  T(F): 98.2 (29 Aug 2022 11:39), Max: 98.2 (29 Aug 2022 11:39)  HR: 102 (29 Aug 2022 11:39) (102 - 120)  BP: 113/60 (29 Aug 2022 11:39) (80/41 - 113/60)  BP(mean): --  ABP: --  ABP(mean): --  RR: 21 (29 Aug 2022 11:39) (20 - 22)  SpO2: 97% (29 Aug 2022 11:39) (94% - 97%)    O2 Parameters below as of 29 Aug 2022 11:39  Patient On (Oxygen Delivery Method): nasal cannula  O2 Flow (L/min): 4 08-29 @ 07:01  -  08-29 @ 16:27  --------------------------------------------------------  IN: 520 mL / OUT: 250 mL / NET: 270 mL      CAPILLARY BLOOD GLUCOSE      POCT Blood Glucose.: 115 mg/dL (28 Aug 2022 19:05)      PHYSICAL EXAM:  General:   HEENT:   Neck:   Chest/Lungs:  Heart:  Abdomen:   Extremities:   Skin:   Neuro:   Psych:     LINES:     HOSPITAL MEDICATIONS:  MEDICATIONS  (STANDING):  chlorhexidine 2% Cloths 1 Application(s) Topical <User Schedule>  cyanocobalamin 1000 MICROGram(s) Oral daily  furosemide    Tablet 20 milliGRAM(s) Oral <User Schedule>  ipratropium    for Nebulization 500 MICROGram(s) Nebulizer every 6 hours  pantoprazole    Tablet 40 milliGRAM(s) Oral before breakfast  Remodulin (Treprostinil) 5mG/mL 1 Vial(s) 1 Vial(s) SubCutaneous Continuous Pump  rivaroxaban 10 milliGRAM(s) Oral daily  spironolactone 25 milliGRAM(s) Oral daily  tiotropium 18 MICROgram(s) Capsule 1 Capsule(s) Inhalation daily    MEDICATIONS  (PRN):  acetaminophen     Tablet .. 650 milliGRAM(s) Oral every 6 hours PRN Temp greater or equal to 38C (100.4F), Mild Pain (1 - 3)  aluminum hydroxide/magnesium hydroxide/simethicone Suspension 30 milliLiter(s) Oral every 4 hours PRN Dyspepsia  artificial  tears Solution 1 Drop(s) Both EYES three times a day PRN Dry Eyes  melatonin 3 milliGRAM(s) Oral at bedtime PRN Insomnia  ondansetron Injectable 4 milliGRAM(s) IV Push every 8 hours PRN Nausea and/or Vomiting      LABS:                        14.9   12.83 )-----------( 410      ( 28 Aug 2022 19:33 )             46.9     Hgb Trend: 14.9<--, 14.9<--  08-29    132<L>  |  100  |  33<H>  ----------------------------<  117<H>  5.7<H>   |  13<L>  |  2.53<H>    Ca    8.9      29 Aug 2022 14:06  Phos  5.2     08-28  Mg     1.8     08-28    TPro  7.6  /  Alb  4.4  /  TBili  2.0<H>  /  DBili  x   /  AST  1025<H>  /  ALT  1050<H>  /  AlkPhos  76  08-29    Creatinine Trend: 2.53<--, 1.48<--, 1.21<--  PT/INR - ( 28 Aug 2022 19:33 )   PT: 26.5 sec;   INR: 2.29 ratio         PTT - ( 28 Aug 2022 19:33 )  PTT:36.7 sec    Arterial Blood Gas:  08-28 @ 19:31  7.37/23/94/13/98.5/-9.7  ABG lactate: --    Venous Blood Gas:  08-28 @ 11:23  7.32/42/24/22/24.8  VBG Lactate: 2.7      RADIOLOGY & ADDITIONAL TESTS:      ACC: [id number] EXAM:  XR CHEST PORTABLE URGENT 1V                          PROCEDURE DATE:  08/28/2022          INTERPRETATION:  HISTORY: Chest pain. Subjective fevers.    TECHNIQUE: A single portable view of the chest was obtained.    COMPARISON: CT scan dated 6/15/2022 and chest radiograph dated 12/14/2021    FINDINGS:  The cardiac silhouette is normal in size. There is a   left-sided dual-lead pacemaker. There are no focal consolidations or   pleural effusions. There are enlarged pulmonary arteries, unchanged. The   osseous structures are intact.    IMPRESSION: Stable enlarged pulmonary arteries. No focal consolidation is   seen.    --- End of Report ---            AVNI MIRANDA MD; Attending Radiologist  This document has been electronically signed. Aug 28 2022  1:18PM      ASSESSMENT AND PLAN:  Pt is a 41 yo F w/ PMHx of PE on xarelto, pHTN Class 1 & IV, ILD, JULIA, right sided heart failure admitted to [facility] for evaluation of palpitations/chest pain, s/p RRT for hypotension (SBP 80s) w/ tachycardia to 130s, now being transferred to MICU for persistent hemodynamic instability w/ concern for septic shock vs cardiogenic shock.     Neuro  -AAAOx3  -No active issues at this time     Cardiovascular  #Shock  -likely septic shock vs cardiogenic shock, possible mixed etiology  -inotrope assisted diuresis   -start on broad spectrum abx w/ zosyn   -f/u BCx, UA  -MRSA PCR       #Cor Pulmonale   -RHF likely 2/2 severe pHTN  -Follows-up outpatient w/ Dr. Car for pHTN  -Class 1 & IV pHTN  -c/w sildenafil   -start flolan?  -echo      Respiratory  #Tachypnea  -CXR on shows enlarged pulmonary arteries, no pleural effusions or consolidations noted  -likely 2/2 septic shock vs severe pHTN   -ABG w/ pH 7.37, pCO2 23, pO2 94, HCO3 12, O2 sat 98.5%   -on 4L NC, saturating appropriately  -CTM     GI/Nutrition  #RUQ discomfort  -likely cholecystitis vs hepatic congestion vs ischemic hepatitis   -AST/ALT elevated at 1025 and 1050, respectively   -Alk Phosph wnl   -f/u RUQ US  -f/u lipase       /Renal  #EDOUARD   -Likely prerenal 2/2 hypovolemia vs intrinsic 2/2 ATN i/s/o shock   -BUN 33, SCr 2.53   -per EMR, BUN 9 and SCr 0.93 in 12/21   -c/w inotrope assisted diuresis  -monitor BMP   -strict I+Os  -avoid nephrotoxins  -renally dose medications as appropriate       Skin  -No active issues    ID  #Leukocytosis  -concern for septic shock   -f/u BCx, UA   -zosyn for empiric coverage     #Endocrine  -f/u TSH     Hematologic/DVT ppx  -xarelto 10 for full AC given hx of PE     #Ethics  -full code MICU Accept Note    CHIEF COMPLAINT: Hypotension     HPI / INTERVAL HISTORY: 42F PMH PE on xarelto, pHTN, ILD, JULIA, right sided heart failure, presents to the hospital for 2 days of palpitations. Patient reports she has had similar episodes of palpitations in the past, however workup in the past was unremarkable. Patient also reporting mild orthopnea. Denies chest pain. Denies diaphoresis. Denies recent use of stimulants including caffeine. Patient also reporting severe nausea and vomiting which has resolved with zofran in the ED. ROS otherwise unremarkable.    ED course: VS with tachycardia. CBC with anemia. CMP normal. BNP 7646. Troponin T normal. EKG with sinus tachycardia, CXR without consolidation. CT chest noncon revealed pulmonary artery dilations likely secondary to pulmonary hypertension. Patient now for admission to medicine for further evaluation and treatment.     RRT called on 8/28 for hypotension (SBP 80s) w/ tachycardia to the 130s, s/p 50 cc of LR that was discontinued i/s/o significant pHTN. MICU consulted at this time. SBP improved to 100s. MICU did not accept at this time given improvement of SBP - recommend HF consult and echo given hx of pHTN w/ RV failure, RUQ US given concern for cholecystitis vs hepatic congestion, and cardio consult if chest pain worsens/recurs. On 8/29, patient had additional episode of hypotension (BP 80/41) w/ tachycardia to 108. Patient deemed candidate for MICU given hemodynamic instability w/ signs of end-organ dysfunction and concern for septic shock vs cardiogenic shock.         PAST MEDICAL & SURGICAL HISTORY:  Tachycardia      Anemia      Pulmonary hypertension      Pulmonary embolism      Asthma  On home O2 nightly at 2L via NC      PE (pulmonary thromboembolism)  on Xarelto 10 mg daily      Sinusitis      Corneal disorder      Right heart failure      CAD (coronary artery disease)      H/O pulmonary hypertension      Pulmonary embolism      Asthma      History of tachycardia      On supplemental oxygen by nasal cannula  O2 @ 2L      Pacemaker      Skin mass  left upper thigh mass      History of tubal ligation      Pacemaker      H/O tubal ligation      History of pacemaker  12/19 - [employer] model Ingevity 4477          FAMILY HISTORY:  Family history of diabetes mellitus  in brother    Family history of diabetes mellitus in mother    FH: anemia        SOCIAL HISTORY:  Smoking:   Substance Use:   EtOH Use:   Marital Status:   Sexual History:   Occupation:  Recent Travel:  Country of Birth:   Advance Directives:     HOME MEDICATIONS:      Allergies    adhesives (Rash)  penicillin (Rash)  penicillin (Unknown)  vancomycin (Rash)  vancomycin (Unknown)    Intolerances    albuterol (Other; Rash)        REVIEW OF SYSTEMS:  Constitutional: No fevers, chills, weight loss, weight gain  HEENT: No vision problems, eye pain, nasal congestion, rhinorrhea, sore throat, dysphagia  CV: No chest pain, orthopnea, palpitations  Resp: No cough, dyspnea, wheezing, hemoptysis  GI: No nausea, vomiting, diarrhea, constipation, abdominal pain  : [ ] dysuria [ ] nocturia [ ] hematuria [ ] increased urinary frequency  Musculoskeletal: [ ] back pain [ ] myalgias [ ] arthralgias [ ] fracture  Skin: [ ] rash [ ] itch  Neurological: [ ] headache [ ] dizziness [ ] syncope [ ] weakness [ ] numbness  Psychiatric: [ ] anxiety [ ] depression  Endocrine: [ ] diabetes [ ] thyroid problem  Hematologic/Lymphatic: [ ] anemia [ ] bleeding problem  Allergic/Immunologic: [ ] itchy eyes [ ] nasal discharge [ ] hives [ ] angioedema  [ ] All other systems negative  [ ] Unable to assess ROS because ________    OBJECTIVE:  ICU Vital Signs Last 24 Hrs  T(C): 36.8 (29 Aug 2022 11:39), Max: 36.8 (29 Aug 2022 11:39)  T(F): 98.2 (29 Aug 2022 11:39), Max: 98.2 (29 Aug 2022 11:39)  HR: 102 (29 Aug 2022 11:39) (102 - 120)  BP: 113/60 (29 Aug 2022 11:39) (80/41 - 113/60)  BP(mean): --  ABP: --  ABP(mean): --  RR: 21 (29 Aug 2022 11:39) (20 - 22)  SpO2: 97% (29 Aug 2022 11:39) (94% - 97%)    O2 Parameters below as of 29 Aug 2022 11:39  Patient On (Oxygen Delivery Method): nasal cannula  O2 Flow (L/min): 4 08-29 @ 07:01  -  08-29 @ 16:27  --------------------------------------------------------  IN: 520 mL / OUT: 250 mL / NET: 270 mL      CAPILLARY BLOOD GLUCOSE      POCT Blood Glucose.: 115 mg/dL (28 Aug 2022 19:05)      PHYSICAL EXAM:  General:   HEENT:   Neck:   Chest/Lungs:  Heart:  Abdomen:   Extremities:   Skin:   Neuro:   Psych:     LINES:     HOSPITAL MEDICATIONS:  MEDICATIONS  (STANDING):  chlorhexidine 2% Cloths 1 Application(s) Topical <User Schedule>  cyanocobalamin 1000 MICROGram(s) Oral daily  furosemide    Tablet 20 milliGRAM(s) Oral <User Schedule>  ipratropium    for Nebulization 500 MICROGram(s) Nebulizer every 6 hours  pantoprazole    Tablet 40 milliGRAM(s) Oral before breakfast  Remodulin (Treprostinil) 5mG/mL 1 Vial(s) 1 Vial(s) SubCutaneous Continuous Pump  rivaroxaban 10 milliGRAM(s) Oral daily  spironolactone 25 milliGRAM(s) Oral daily  tiotropium 18 MICROgram(s) Capsule 1 Capsule(s) Inhalation daily    MEDICATIONS  (PRN):  acetaminophen     Tablet .. 650 milliGRAM(s) Oral every 6 hours PRN Temp greater or equal to 38C (100.4F), Mild Pain (1 - 3)  aluminum hydroxide/magnesium hydroxide/simethicone Suspension 30 milliLiter(s) Oral every 4 hours PRN Dyspepsia  artificial  tears Solution 1 Drop(s) Both EYES three times a day PRN Dry Eyes  melatonin 3 milliGRAM(s) Oral at bedtime PRN Insomnia  ondansetron Injectable 4 milliGRAM(s) IV Push every 8 hours PRN Nausea and/or Vomiting      LABS:                        14.9   12.83 )-----------( 410      ( 28 Aug 2022 19:33 )             46.9     Hgb Trend: 14.9<--, 14.9<--  08-29    132<L>  |  100  |  33<H>  ----------------------------<  117<H>  5.7<H>   |  13<L>  |  2.53<H>    Ca    8.9      29 Aug 2022 14:06  Phos  5.2     08-28  Mg     1.8     08-28    TPro  7.6  /  Alb  4.4  /  TBili  2.0<H>  /  DBili  x   /  AST  1025<H>  /  ALT  1050<H>  /  AlkPhos  76  08-29    Creatinine Trend: 2.53<--, 1.48<--, 1.21<--  PT/INR - ( 28 Aug 2022 19:33 )   PT: 26.5 sec;   INR: 2.29 ratio         PTT - ( 28 Aug 2022 19:33 )  PTT:36.7 sec    Arterial Blood Gas:  08-28 @ 19:31  7.37/23/94/13/98.5/-9.7  ABG lactate: --    Venous Blood Gas:  08-28 @ 11:23  7.32/42/24/22/24.8  VBG Lactate: 2.7      RADIOLOGY & ADDITIONAL TESTS:      ACC: [id number] EXAM:  XR CHEST PORTABLE URGENT 1V                          PROCEDURE DATE:  08/28/2022          INTERPRETATION:  HISTORY: Chest pain. Subjective fevers.    TECHNIQUE: A single portable view of the chest was obtained.    COMPARISON: CT scan dated 6/15/2022 and chest radiograph dated 12/14/2021    FINDINGS:  The cardiac silhouette is normal in size. There is a   left-sided dual-lead pacemaker. There are no focal consolidations or   pleural effusions. There are enlarged pulmonary arteries, unchanged. The   osseous structures are intact.    IMPRESSION: Stable enlarged pulmonary arteries. No focal consolidation is   seen.    --- End of Report ---            AVNI MIRANDA MD; Attending Radiologist  This document has been electronically signed. Aug 28 2022  1:18PM      ASSESSMENT AND PLAN:  Pt is a 41 yo F w/ PMHx of PE on xarelto, pHTN Class 1 & IV, ILD, JULIA, right sided heart failure admitted to [facility] for evaluation of palpitations/chest pain, s/p RRT for hypotension (SBP 80s) w/ tachycardia to 130s, now being transferred to MICU for persistent hemodynamic instability w/ concern for septic shock vs cardiogenic shock.     Neuro  -AAAOx3  -No active issues at this time     Cardiovascular  #Shock  -likely septic shock vs cardiogenic shock, possible mixed etiology  -inotrope assisted diuresis   -start on broad spectrum abx w/ zosyn   -f/u BCx, UA  -MRSA PCR       #Cor Pulmonale   -RHF likely 2/2 severe pHTN  -Follows-up outpatient w/ Dr. Car for pHTN  -Class 1 & IV pHTN  -c/w sildenafil   -c/w remodulin   -echo      Respiratory  #Tachypnea  -CXR on shows enlarged pulmonary arteries, no pleural effusions or consolidations noted  -likely 2/2 septic shock vs severe pHTN   -ABG w/ pH 7.37, pCO2 23, pO2 94, HCO3 12, O2 sat 98.5%   -on 4L NC, saturating appropriately  -CTM     GI/Nutrition  #RUQ discomfort  -likely cholecystitis vs hepatic congestion vs ischemic hepatitis   -AST/ALT elevated at 1025 and 1050, respectively   -Alk Phosph wnl   -f/u RUQ US  -f/u lipase       /Renal  #EDOUARD   -Likely prerenal 2/2 hypovolemia vs intrinsic 2/2 ATN i/s/o shock   -BUN 33, SCr 2.53   -per EMR, BUN 9 and SCr 0.93 in 12/21   -c/w inotrope assisted diuresis  -monitor BMP   -strict I+Os  -avoid nephrotoxins  -renally dose medications as appropriate       Skin  -No active issues    ID  #Leukocytosis  -concern for septic shock   -f/u BCx, UA   -zosyn for empiric coverage     #Endocrine  -f/u TSH     Hematologic/DVT ppx  -xarelto 10 for full AC given hx of PE     #Ethics  -full code MICU Accept Note    CHIEF COMPLAINT: Hypotension     HPI / INTERVAL HISTORY: 42F PMH PE on xarelto, pHTN, ILD, JULIA, right sided heart failure, presents to the hospital for 2 days of palpitations. Patient reports she has had similar episodes of palpitations in the past, however workup in the past was unremarkable. Patient also reporting mild orthopnea. Denies chest pain. Denies diaphoresis. Denies recent use of stimulants including caffeine. Patient also reporting severe nausea and vomiting which has resolved with zofran in the ED. ROS otherwise unremarkable.    ED course: VS with tachycardia. CBC with anemia. CMP normal. BNP 7646. Troponin T normal. EKG with sinus tachycardia, CXR without consolidation. CT chest noncon revealed pulmonary artery dilations likely secondary to pulmonary hypertension. Patient now for admission to medicine for further evaluation and treatment.     RRT called on 8/28 for hypotension (SBP 80s) w/ tachycardia to the 130s, s/p 50 cc of LR that was discontinued i/s/o significant pHTN. MICU consulted at this time. SBP improved to 100s. MICU did not accept at this time given improvement of SBP - recommend HF consult and echo given hx of pHTN w/ RV failure, RUQ US given concern for cholecystitis vs hepatic congestion, and cardio consult if chest pain worsens/recurs. On 8/29, patient had additional episode of hypotension (BP 80/41) w/ tachycardia to 108. Patient deemed candidate for MICU given hemodynamic instability w/ signs of end-organ dysfunction and concern for septic shock vs cardiogenic shock.         PAST MEDICAL & SURGICAL HISTORY:  Tachycardia      Anemia      Pulmonary hypertension      Pulmonary embolism      Asthma  On home O2 nightly at 2L via NC      PE (pulmonary thromboembolism)  on Xarelto 10 mg daily      Sinusitis      Corneal disorder      Right heart failure      CAD (coronary artery disease)      H/O pulmonary hypertension      Pulmonary embolism      Asthma      History of tachycardia      On supplemental oxygen by nasal cannula  O2 @ 2L      Pacemaker      Skin mass  left upper thigh mass      History of tubal ligation      Pacemaker      H/O tubal ligation      History of pacemaker  12/19 - [place] [employer] model Ingevity 4410          FAMILY HISTORY:  Family history of diabetes mellitus  in brother    Family history of diabetes mellitus in mother    FH: anemia        SOCIAL HISTORY:  Smoking:   Substance Use:   EtOH Use:   Marital Status:   Sexual History:   Occupation:  Recent Travel:  Country of Birth:   Advance Directives:     HOME MEDICATIONS:      Allergies    adhesives (Rash)  penicillin (Rash)  penicillin (Unknown)  vancomycin (Rash)  vancomycin (Unknown)    Intolerances    albuterol (Other; Rash)    OBJECTIVE:  ICU Vital Signs Last 24 Hrs  T(C): 36.8 (29 Aug 2022 11:39), Max: 36.8 (29 Aug 2022 11:39)  T(F): 98.2 (29 Aug 2022 11:39), Max: 98.2 (29 Aug 2022 11:39)  HR: 102 (29 Aug 2022 11:39) (102 - 120)  BP: 113/60 (29 Aug 2022 11:39) (80/41 - 113/60)  BP(mean): --  ABP: --  ABP(mean): --  RR: 21 (29 Aug 2022 11:39) (20 - 22)  SpO2: 97% (29 Aug 2022 11:39) (94% - 97%)    O2 Parameters below as of 29 Aug 2022 11:39  Patient On (Oxygen Delivery Method): nasal cannula  O2 Flow (L/min): 4 08-29 @ 07:01  -  08-29 @ 16:27  --------------------------------------------------------  IN: 520 mL / OUT: 250 mL / NET: 270 mL      PHYSICAL EXAM:  General: NAD  HEENT: NC/AT, EOMI b/l   Respiratory: CTA b/l, no w/r/c, appears comfortable on NC, no accessory muscle use   Cardiovascular: Normal S1/S2, RRR, no murmurs/rubs/gallops  Abdomen: TTP of RUQ  Skin: no rashes or lesions noted  Neurological: AAOx3, no focal deficits        CAPILLARY BLOOD GLUCOSE      POCT Blood Glucose.: 115 mg/dL (28 Aug 2022 19:05)      HOSPITAL MEDICATIONS:  MEDICATIONS  (STANDING):  chlorhexidine 2% Cloths 1 Application(s) Topical <User Schedule>  cyanocobalamin 1000 MICROGram(s) Oral daily  furosemide    Tablet 20 milliGRAM(s) Oral <User Schedule>  ipratropium    for Nebulization 500 MICROGram(s) Nebulizer every 6 hours  pantoprazole    Tablet 40 milliGRAM(s) Oral before breakfast  Remodulin (Treprostinil) 5mG/mL 1 Vial(s) 1 Vial(s) SubCutaneous Continuous Pump  rivaroxaban 10 milliGRAM(s) Oral daily  spironolactone 25 milliGRAM(s) Oral daily  tiotropium 18 MICROgram(s) Capsule 1 Capsule(s) Inhalation daily    MEDICATIONS  (PRN):  acetaminophen     Tablet .. 650 milliGRAM(s) Oral every 6 hours PRN Temp greater or equal to 38C (100.4F), Mild Pain (1 - 3)  aluminum hydroxide/magnesium hydroxide/simethicone Suspension 30 milliLiter(s) Oral every 4 hours PRN Dyspepsia  artificial  tears Solution 1 Drop(s) Both EYES three times a day PRN Dry Eyes  melatonin 3 milliGRAM(s) Oral at bedtime PRN Insomnia  ondansetron Injectable 4 milliGRAM(s) IV Push every 8 hours PRN Nausea and/or Vomiting      LABS:                        14.9   12.83 )-----------( 410      ( 28 Aug 2022 19:33 )             46.9     Hgb Trend: 14.9<--, 14.9<--  08-29    132<L>  |  100  |  33<H>  ----------------------------<  117<H>  5.7<H>   |  13<L>  |  2.53<H>    Ca    8.9      29 Aug 2022 14:06  Phos  5.2     08-28  Mg     1.8     08-28    TPro  7.6  /  Alb  4.4  /  TBili  2.0<H>  /  DBili  x   /  AST  1025<H>  /  ALT  1050<H>  /  AlkPhos  76  08-29    Creatinine Trend: 2.53<--, 1.48<--, 1.21<--  PT/INR - ( 28 Aug 2022 19:33 )   PT: 26.5 sec;   INR: 2.29 ratio         PTT - ( 28 Aug 2022 19:33 )  PTT:36.7 sec    Arterial Blood Gas:  08-28 @ 19:31  7.37/23/94/13/98.5/-9.7  ABG lactate: --    Venous Blood Gas:  08-28 @ 11:23  7.32/42/24/22/24.8  VBG Lactate: 2.7      RADIOLOGY & ADDITIONAL TESTS:      ACC: [id number] EXAM:  XR CHEST PORTABLE URGENT 1V                          PROCEDURE DATE:  08/28/2022          INTERPRETATION:  HISTORY: Chest pain. Subjective fevers.    TECHNIQUE: A single portable view of the chest was obtained.    COMPARISON: CT scan dated 6/15/2022 and chest radiograph dated 12/14/2021    FINDINGS:  The cardiac silhouette is normal in size. There is a   left-sided dual-lead pacemaker. There are no focal consolidations or   pleural effusions. There are enlarged pulmonary arteries, unchanged. The   osseous structures are intact.    IMPRESSION: Stable enlarged pulmonary arteries. No focal consolidation is   seen.    --- End of Report ---            AVNI MIRANDA MD; Attending Radiologist  This document has been electronically signed. Aug 28 2022  1:18PM      ASSESSMENT AND PLAN:  Pt is a 43 yo F w/ PMHx of PE on xarelto, pHTN Class 1 & IV, ILD, JULIA, right sided heart failure admitted to [facility] for evaluation of palpitations/chest pain, s/p RRT for hypotension (SBP 80s) w/ tachycardia to 130s, now being transferred to MICU for persistent hemodynamic instability w/ concern for septic shock vs cardiogenic shock.     Neuro  -AAAOx3  -No active issues at this time     Cardiovascular  #Shock  -likely septic shock vs cardiogenic shock, possible mixed etiology  -inotrope assisted diuresis   -start on broad spectrum abx w/ zosyn   -f/u BCx, UA  -MRSA PCR       #Cor Pulmonale   -RHF likely 2/2 severe pHTN  -Follows-up outpatient w/ Dr. Yoon for pHTN  -Class 1 & IV pHTN  -c/w sildenafil   -c/w remodulin   -echo      Respiratory  #Tachypnea  -CXR on shows enlarged pulmonary arteries, no pleural effusions or consolidations noted  -likely 2/2 septic shock vs severe pHTN   -ABG w/ pH 7.37, pCO2 23, pO2 94, HCO3 12, O2 sat 98.5%   -on 4L NC, saturating appropriately  -CTM     GI/Nutrition  #RUQ discomfort  -likely cholecystitis vs hepatic congestion vs ischemic hepatitis   -AST/ALT elevated at 1025 and 1050, respectively   -Alk Phosph wnl   -f/u RUQ US  -f/u lipase       /Renal  #EDOUARD   -Likely prerenal 2/2 hypovolemia vs intrinsic 2/2 ATN i/s/o shock   -BUN 33, SCr 2.53   -per EMR, BUN 9 and SCr 0.93 in 12/21   -c/w inotrope assisted diuresis  -monitor BMP   -strict I+Os  -avoid nephrotoxins  -renally dose medications as appropriate       Skin  -No active issues    ID  #Leukocytosis  -concern for septic shock   -f/u BCx, UA   -zosyn for empiric coverage     #Endocrine  -f/u TSH     Hematologic/DVT ppx  -xarelto 10 for full AC given hx of PE     #Ethics  -full code MICU Accept Note    CHIEF COMPLAINT: Hypotension     HPI / INTERVAL HISTORY: 42F PMH PE on xarelto, pHTN, ILD, JULIA, right sided heart failure, presents to the hospital for 2 days of palpitations. Patient reports she has had similar episodes of palpitations in the past, however workup in the past was unremarkable. Patient also reporting mild orthopnea. Denies chest pain. Denies diaphoresis. Denies recent use of stimulants including caffeine. Patient also reporting severe nausea and vomiting which has resolved with zofran in the ED. ROS otherwise unremarkable.    ED course: VS with tachycardia. CBC with anemia. CMP normal. BNP 7646. Troponin T normal. EKG with sinus tachycardia, CXR without consolidation. CT chest noncon revealed pulmonary artery dilations likely secondary to pulmonary hypertension. Patient now for admission to medicine for further evaluation and treatment.     RRT called on 8/28 for hypotension (SBP 80s) w/ tachycardia to the 130s, s/p 50 cc of LR that was discontinued i/s/o significant pHTN. MICU consulted at this time. SBP improved to 100s. MICU did not accept at this time given improvement of SBP - recommend HF consult and echo given hx of pHTN w/ RV failure, RUQ US given concern for cholecystitis vs hepatic congestion, and cardio consult if chest pain worsens/recurs. On 8/29, patient had additional episode of hypotension (BP 80/41) w/ tachycardia to 108. Patient deemed candidate for MICU given hemodynamic instability w/ signs of end-organ dysfunction and concern for septic shock vs cardiogenic shock.         PAST MEDICAL & SURGICAL HISTORY:  Tachycardia      Anemia      Pulmonary hypertension      Pulmonary embolism      Asthma  On home O2 nightly at 2L via NC      PE (pulmonary thromboembolism)  on Xarelto 10 mg daily      Sinusitis      Corneal disorder      Right heart failure      CAD (coronary artery disease)      H/O pulmonary hypertension      Pulmonary embolism      Asthma      History of tachycardia      On supplemental oxygen by nasal cannula  O2 @ 2L      Pacemaker      Skin mass  left upper thigh mass      History of tubal ligation      Pacemaker      H/O tubal ligation      History of pacemaker  12/19 - [employer] model Ingevity 4463          FAMILY HISTORY:  Family history of diabetes mellitus  in brother    Family history of diabetes mellitus in mother    FH: anemia        Allergies    adhesives (Rash)  penicillin (Rash)  penicillin (Unknown)  vancomycin (Rash)  vancomycin (Unknown)    Intolerances    albuterol (Other; Rash)    OBJECTIVE:  ICU Vital Signs Last 24 Hrs  T(C): 36.8 (29 Aug 2022 11:39), Max: 36.8 (29 Aug 2022 11:39)  T(F): 98.2 (29 Aug 2022 11:39), Max: 98.2 (29 Aug 2022 11:39)  HR: 102 (29 Aug 2022 11:39) (102 - 120)  BP: 113/60 (29 Aug 2022 11:39) (80/41 - 113/60)  BP(mean): --  ABP: --  ABP(mean): --  RR: 21 (29 Aug 2022 11:39) (20 - 22)  SpO2: 97% (29 Aug 2022 11:39) (94% - 97%)    O2 Parameters below as of 29 Aug 2022 11:39  Patient On (Oxygen Delivery Method): nasal cannula  O2 Flow (L/min): 4            08-29 @ 07:01  -  08-29 @ 16:27  --------------------------------------------------------  IN: 520 mL / OUT: 250 mL / NET: 270 mL      PHYSICAL EXAM:  General: NAD  HEENT: NC/AT, EOMI b/l   Respiratory: CTA b/l, no w/r/c, appears comfortable on NC, no accessory muscle use   Cardiovascular: Normal S1/S2, RRR, no murmurs/rubs/gallops  Abdomen: TTP of RUQ  Skin: no rashes or lesions noted  Neurological: AAOx3, no focal deficits        CAPILLARY BLOOD GLUCOSE      POCT Blood Glucose.: 115 mg/dL (28 Aug 2022 19:05)      HOSPITAL MEDICATIONS:  MEDICATIONS  (STANDING):  chlorhexidine 2% Cloths 1 Application(s) Topical <User Schedule>  cyanocobalamin 1000 MICROGram(s) Oral daily  furosemide    Tablet 20 milliGRAM(s) Oral <User Schedule>  ipratropium    for Nebulization 500 MICROGram(s) Nebulizer every 6 hours  pantoprazole    Tablet 40 milliGRAM(s) Oral before breakfast  Remodulin (Treprostinil) 5mG/mL 1 Vial(s) 1 Vial(s) SubCutaneous Continuous Pump  rivaroxaban 10 milliGRAM(s) Oral daily  spironolactone 25 milliGRAM(s) Oral daily  tiotropium 18 MICROgram(s) Capsule 1 Capsule(s) Inhalation daily    MEDICATIONS  (PRN):  acetaminophen     Tablet .. 650 milliGRAM(s) Oral every 6 hours PRN Temp greater or equal to 38C (100.4F), Mild Pain (1 - 3)  aluminum hydroxide/magnesium hydroxide/simethicone Suspension 30 milliLiter(s) Oral every 4 hours PRN Dyspepsia  artificial  tears Solution 1 Drop(s) Both EYES three times a day PRN Dry Eyes  melatonin 3 milliGRAM(s) Oral at bedtime PRN Insomnia  ondansetron Injectable 4 milliGRAM(s) IV Push every 8 hours PRN Nausea and/or Vomiting      LABS:                        14.9   12.83 )-----------( 410      ( 28 Aug 2022 19:33 )             46.9     Hgb Trend: 14.9<--, 14.9<--  08-29    132<L>  |  100  |  33<H>  ----------------------------<  117<H>  5.7<H>   |  13<L>  |  2.53<H>    Ca    8.9      29 Aug 2022 14:06  Phos  5.2     08-28  Mg     1.8     08-28    TPro  7.6  /  Alb  4.4  /  TBili  2.0<H>  /  DBili  x   /  AST  1025<H>  /  ALT  1050<H>  /  AlkPhos  76  08-29    Creatinine Trend: 2.53<--, 1.48<--, 1.21<--  PT/INR - ( 28 Aug 2022 19:33 )   PT: 26.5 sec;   INR: 2.29 ratio         PTT - ( 28 Aug 2022 19:33 )  PTT:36.7 sec    Arterial Blood Gas:  08-28 @ 19:31  7.37/23/94/13/98.5/-9.7  ABG lactate: --    Venous Blood Gas:  08-28 @ 11:23  7.32/42/24/22/24.8  VBG Lactate: 2.7      RADIOLOGY & ADDITIONAL TESTS:      ACC: [id number] EXAM:  XR CHEST PORTABLE URGENT 1V                          PROCEDURE DATE:  08/28/2022          INTERPRETATION:  HISTORY: Chest pain. Subjective fevers.    TECHNIQUE: A single portable view of the chest was obtained.    COMPARISON: CT scan dated 6/15/2022 and chest radiograph dated 12/14/2021    FINDINGS:  The cardiac silhouette is normal in size. There is a   left-sided dual-lead pacemaker. There are no focal consolidations or   pleural effusions. There are enlarged pulmonary arteries, unchanged. The   osseous structures are intact.    IMPRESSION: Stable enlarged pulmonary arteries. No focal consolidation is   seen.    --- End of Report ---            AVNI MIRANDA MD; Attending Radiologist  This document has been electronically signed. Aug 28 2022  1:18PM      ASSESSMENT AND PLAN:  Pt is a 43 yo F w/ PMHx of PE on xarelto, pHTN Class 1 & IV, ILD, JULIA, right sided heart failure admitted to [facility] for evaluation of palpitations/chest pain, s/p RRT for hypotension (SBP 80s) w/ tachycardia to 130s, now being transferred to MICU for persistent hemodynamic instability w/ concern for septic shock 2/2 unknown source vs cardiogenic shock i/s/o decompensated RHF.     Neuro  -AAOx3  -No active issues at this time     Cardiovascular  #Shock  -likely septic shock vs cardiogenic shock, possible mixed etiology  -lasix 60 mg IV x1   -levo gtt   -start on broad spectrum abx w/ zosyn   -f/u BCx, UA  -MRSA PCR       #Cor Pulmonale   -RHF likely 2/2 severe pHTN  -Follows-up outpatient w/ Dr. Yoon for pHTN  -Class 1 & IV pHTN  -c/w sildenafil   -c/w remodulin subcut., possibly switch to IV in AM   -f/u echo  -will start nitric oxide at 10 ppm and monitor for methemoglobinemia q48 w/ co-oximetry       Respiratory  #Tachypnea  -CXR on shows enlarged pulmonary arteries, no pleural effusions or consolidations noted  -likely compensatory response to AGMA i/s/o lactic acidosis and uremia    -vbg w/ pH 7.13, lactate 4.9, pCO2 47  -on 4L NC, saturating appropriately  -CTM     GI/Nutrition  #RUQ discomfort  -likely cholecystitis vs hepatic congestion vs ischemic hepatitis   -AST/ALT elevated at 1025 and 1050, respectively   -Alk Phosph wnl   -f/u RUQ US  -f/u lipase       /Renal  #EDOUARD   -Likely prerenal 2/2 poor perfusion vs intrinsic 2/2 ATN i/s/o shock   -BUN 33, SCr 2.53   -per EMR, BUN 9 and SCr 0.93 in 12/21   -monitor BMP   -strict I+Os  -avoid nephrotoxins  -renally dose medications as appropriate       Skin  -No active issues    ID  #Leukocytosis  -concern for septic shock   -f/u BCx, UA   -zosyn for empiric coverage     #Endocrine  -f/u TSH     Hematologic/DVT ppx  -xarelto 10 for full AC given hx of PE     #Ethics  -full code MICU Accept Note    CHIEF COMPLAINT: Hypotension     HPI / INTERVAL HISTORY: 42F PMH PE on xarelto, pHTN, ILD, JULIA, right sided heart failure, presents to the hospital for 2 days of palpitations. Patient reports she has had similar episodes of palpitations in the past, however workup in the past was unremarkable. Patient also reporting mild orthopnea. Denies chest pain. Denies diaphoresis. Denies recent use of stimulants including caffeine. Patient also reporting severe nausea and vomiting which has resolved with zofran in the ED. ROS otherwise unremarkable.    ED course: VS with tachycardia. CBC with anemia. CMP normal. BNP 7646. Troponin T normal. EKG with sinus tachycardia, CXR without consolidation. CT chest noncon revealed pulmonary artery dilations likely secondary to pulmonary hypertension. Patient now for admission to medicine for further evaluation and treatment.     RRT called on 8/28 for hypotension (SBP 80s) w/ tachycardia to the 130s, s/p 50 cc of LR that was discontinued i/s/o significant pHTN. MICU consulted at this time. SBP improved to 100s. MICU did not accept at this time given improvement of SBP - recommend HF consult and echo given hx of pHTN w/ RV failure, RUQ US given concern for cholecystitis vs hepatic congestion, and cardio consult if chest pain worsens/recurs. On 8/29, patient had additional episode of hypotension (BP 80/41) w/ tachycardia to 108. Patient deemed candidate for MICU given hemodynamic instability w/ signs of end-organ dysfunction and concern for septic shock vs cardiogenic shock.         PAST MEDICAL & SURGICAL HISTORY:  Tachycardia  Anemia  Pulmonary hypertension  Pulmonary embolism  Asthma  On home O2 nightly at 2L via NC  PE (pulmonary thromboembolism)  on Xarelto 10 mg daily  Sinusitis  Corneal disorder  Right heart failure  CAD (coronary artery disease)  Skin mass  left upper thigh mass  H/O tubal ligation  History of pacemaker  12/19 - [employer] model Ingevity 4469      FAMILY HISTORY:  Family history of diabetes mellitus  in brother    Family history of diabetes mellitus in mother    FH: anemia      Allergies    adhesives (Rash)  penicillin (Rash)  penicillin (Unknown)  vancomycin (Rash)  vancomycin (Unknown)    Intolerances    albuterol (Other; Rash)    OBJECTIVE:  ICU Vital Signs Last 24 Hrs  T(C): 36.8 (29 Aug 2022 11:39), Max: 36.8 (29 Aug 2022 11:39)  T(F): 98.2 (29 Aug 2022 11:39), Max: 98.2 (29 Aug 2022 11:39)  HR: 102 (29 Aug 2022 11:39) (102 - 120)  BP: 113/60 (29 Aug 2022 11:39) (80/41 - 113/60)  BP(mean): --  ABP: --  ABP(mean): --  RR: 21 (29 Aug 2022 11:39) (20 - 22)  SpO2: 97% (29 Aug 2022 11:39) (94% - 97%)    O2 Parameters below as of 29 Aug 2022 11:39  Patient On (Oxygen Delivery Method): nasal cannula  O2 Flow (L/min): 4 08-29 @ 07:01  -  08-29 @ 16:27  --------------------------------------------------------  IN: 520 mL / OUT: 250 mL / NET: 270 mL      PHYSICAL EXAM:  General: NAD  HEENT: NC/AT, EOMI b/l   Respiratory: CTA b/l, no w/r/c, appears comfortable on NC, no accessory muscle use   Cardiovascular: Normal S1/S2, RRR, no murmurs/rubs/gallops  Abdomen: TTP of RUQ  Skin: no rashes or lesions noted  Neurological: AAOx3, no focal deficits      CAPILLARY BLOOD GLUCOSE  POCT Blood Glucose.: 115 mg/dL (28 Aug 2022 19:05)      HOSPITAL MEDICATIONS:  MEDICATIONS  (STANDING):  chlorhexidine 2% Cloths 1 Application(s) Topical <User Schedule>  cyanocobalamin 1000 MICROGram(s) Oral daily  furosemide    Tablet 20 milliGRAM(s) Oral <User Schedule>  ipratropium    for Nebulization 500 MICROGram(s) Nebulizer every 6 hours  pantoprazole    Tablet 40 milliGRAM(s) Oral before breakfast  Remodulin (Treprostinil) 5mG/mL 1 Vial(s) 1 Vial(s) SubCutaneous Continuous Pump  rivaroxaban 10 milliGRAM(s) Oral daily  spironolactone 25 milliGRAM(s) Oral daily  tiotropium 18 MICROgram(s) Capsule 1 Capsule(s) Inhalation daily    MEDICATIONS  (PRN):  acetaminophen     Tablet .. 650 milliGRAM(s) Oral every 6 hours PRN Temp greater or equal to 38C (100.4F), Mild Pain (1 - 3)  aluminum hydroxide/magnesium hydroxide/simethicone Suspension 30 milliLiter(s) Oral every 4 hours PRN Dyspepsia  artificial  tears Solution 1 Drop(s) Both EYES three times a day PRN Dry Eyes  melatonin 3 milliGRAM(s) Oral at bedtime PRN Insomnia  ondansetron Injectable 4 milliGRAM(s) IV Push every 8 hours PRN Nausea and/or Vomiting      LABS:                        14.9   12.83 )-----------( 410      ( 28 Aug 2022 19:33 )             46.9     Hgb Trend: 14.9<--, 14.9<--  08-29    132<L>  |  100  |  33<H>  ----------------------------<  117<H>  5.7<H>   |  13<L>  |  2.53<H>    Ca    8.9      29 Aug 2022 14:06  Phos  5.2     08-28  Mg     1.8     08-28    TPro  7.6  /  Alb  4.4  /  TBili  2.0<H>  /  DBili  x   /  AST  1025<H>  /  ALT  1050<H>  /  AlkPhos  76  08-29    Creatinine Trend: 2.53<--, 1.48<--, 1.21<--  PT/INR - ( 28 Aug 2022 19:33 )   PT: 26.5 sec;   INR: 2.29 ratio         PTT - ( 28 Aug 2022 19:33 )  PTT:36.7 sec    Arterial Blood Gas:  08-28 @ 19:31  7.37/23/94/13/98.5/-9.7  ABG lactate: --    Venous Blood Gas:  08-28 @ 11:23  7.32/42/24/22/24.8  VBG Lactate: 2.7      RADIOLOGY & ADDITIONAL TESTS:  Personally reviewed.    ACC: [id number] EXAM:  XR CHEST PORTABLE URGENT 1V                          PROCEDURE DATE:  08/28/2022          INTERPRETATION:  HISTORY: Chest pain. Subjective fevers.    TECHNIQUE: A single portable view of the chest was obtained.    COMPARISON: CT scan dated 6/15/2022 and chest radiograph dated 12/14/2021    FINDINGS:  The cardiac silhouette is normal in size. There is a   left-sided dual-lead pacemaker. There are no focal consolidations or   pleural effusions. There are enlarged pulmonary arteries, unchanged. The   osseous structures are intact.    IMPRESSION: Stable enlarged pulmonary arteries. No focal consolidation is   seen.    --- End of Report ---    AVNI MIRANDA MD; Attending Radiologist  This document has been electronically signed. Aug 28 2022  1:18PM            ASSESSMENT AND PLAN:  Pt is a 41 yo F w/ PMHx of PE on xarelto, pHTN Class 1 & IV, ILD, JULIA, right sided heart failure admitted to [facility] for evaluation of palpitations/chest pain, s/p RRT for hypotension (SBP 80s) w/ tachycardia to 130s, now being transferred to MICU for persistent hemodynamic instability w/ concern for septic shock 2/2 unknown source vs cardiogenic shock i/s/o decompensated RHF.     Neuro  -AAOx3  -No active issues at this time     Cardiovascular  #Shock  #Acute on chronic right heart failure  -likely septic shock vs cardiogenic shock, possible mixed etiology  -lasix 60 mg IV x1 and then continuing with goal of net negative fluid balance  -levo gtt   -infectious workup as below      #Cor Pulmonale   -RHF likely 2/2 severe pHTN  -Follows-up outpatient w/ Dr. Yoon for pHTN  -Class 1 & IV pHTN  -c/w sildenafil   -c/w remodulin subcut., possibly switch to IV in AM   -f/u echo  -will start nitric oxide at 10 ppm and monitor for methemoglobinemia q48 w/ co-oximetry       Respiratory  #Tachypnea  -CXR on shows enlarged pulmonary arteries, no pleural effusions or consolidations noted  -likely compensatory response to AGMA i/s/o lactic acidosis and uremia    -vbg w/ pH 7.13, lactate 4.9, pCO2 47  -on 4L NC, saturating appropriately      GI/Nutrition  #RUQ discomfort  -likely hepatic congestion due to acute right heart failure, r/o cholecystitis  -AST/ALT elevated at 1025 and 1050, respectively   -Alk Phosph wnl   -f/u RUQ US  -f/u lipase       /Renal  #EDOUARD   -Likely prerenal 2/2 poor perfusion vs intrinsic 2/2 ATN i/s/o shock   -BUN 33, SCr 2.53   -per EMR, BUN 9 and SCr 0.93 in 12/21   -monitor BMP   -strict I+Os  -avoid nephrotoxins  -renally dose medications as appropriate       Skin  -No active issues    ID  #Leukocytosis  -concern for contributing sepsis  -f/u BCx, UA   -aztreonam for empiric coverage     #Endocrine  -f/u TSH     Hematologic/DVT ppx  -xarelto 10 for full AC given hx of PE     #Ethics  -full code  - is surrogate decision maker

## 2022-08-29 NOTE — PROGRESS NOTE ADULT - PROBLEM SELECTOR PLAN 2
Patient with longstanding history of pulmonary hypertension secondary to ILD. Follows with Dr. Aleksandr Yoon as an outpatient.    - Continue medication Remodulin pump (patient has brought her own supply)  - Hold sildenafil in the setting of hypotension   - Continue oxygen supplementation as needed, keep sats >92%   - Dr. Yoon consulted > F/U on recs Patient with longstanding history of pulmonary hypertension secondary to ILD. Follows with Dr. Aleksandr Yoon as an outpatient.    - Continue medication Remodulin pump (patient has brought her own supply)  - Continue home medication prednisone 10mg po qd   - Hold sildenafil in the setting of hypotension   - Continue oxygen supplementation as needed, keep sats >92%   - Dr. Yoon consulted > F/U on recs

## 2022-08-29 NOTE — CONSULT NOTE ADULT - ATTENDING COMMENTS
Briefly, 42F PMH PE on xarelto, pulmonary HTN (likely group I and group IV), ILD, JULIA, obesity presents to the hospital for 2 days of palpitations and abdominal pain/nausea/emesis. Denies any inciting event. Noted to have labile hypotension and labs notable for worsening transaminitis, BNP 9k (much higher than prior) as well as acute kidney injury. On exam, mild distress, JvP elevated, prominent P2, tachycardic, CTAB, TTP along abdomen, cool extremities. Labs reviewed. TTE done which showed hyperdnamic and compressed LV, severe RV dysfunction, mod TR, dilated IVC. Overall stage D, NYHA class IV with severe pulmonary HTN.  - transfer to MICU  - start  and Marcela   - c/w Remodulin   - diurese for goal negative   - will need to evaluate if patient candidate for transplant but unlikely d/t BMI  - c/w AC  - prognosis guarded

## 2022-08-29 NOTE — PROGRESS NOTE ADULT - PROBLEM SELECTOR PLAN 6
Patient with a history of anemia secondary to iron deficiency. Patient previously was on PO iron however this was discontinued secondary to GI complaints  - F/U iron studies, ferritin  - Monitor CBC  - Transfuse prbc to keep Hgb >8

## 2022-08-30 LAB
ALBUMIN SERPL ELPH-MCNC: 4 G/DL — SIGNIFICANT CHANGE UP (ref 3.3–5)
ALBUMIN SERPL ELPH-MCNC: 4 G/DL — SIGNIFICANT CHANGE UP (ref 3.3–5)
ALBUMIN SERPL ELPH-MCNC: 4.1 G/DL — SIGNIFICANT CHANGE UP (ref 3.3–5)
ALP SERPL-CCNC: 73 U/L — SIGNIFICANT CHANGE UP (ref 40–120)
ALP SERPL-CCNC: 75 U/L — SIGNIFICANT CHANGE UP (ref 40–120)
ALP SERPL-CCNC: 76 U/L — SIGNIFICANT CHANGE UP (ref 40–120)
ALT FLD-CCNC: 1332 U/L — HIGH (ref 10–45)
ALT FLD-CCNC: 1569 U/L — HIGH (ref 10–45)
ALT FLD-CCNC: 1573 U/L — HIGH (ref 10–45)
ANION GAP SERPL CALC-SCNC: 12 MMOL/L — SIGNIFICANT CHANGE UP (ref 5–17)
ANION GAP SERPL CALC-SCNC: 16 MMOL/L — SIGNIFICANT CHANGE UP (ref 5–17)
ANION GAP SERPL CALC-SCNC: 18 MMOL/L — HIGH (ref 5–17)
APTT BLD: 33.4 SEC — SIGNIFICANT CHANGE UP (ref 27.5–35.5)
APTT BLD: 33.5 SEC — SIGNIFICANT CHANGE UP (ref 27.5–35.5)
AST SERPL-CCNC: 1170 U/L — HIGH (ref 10–40)
AST SERPL-CCNC: 1449 U/L — HIGH (ref 10–40)
AST SERPL-CCNC: 662 U/L — HIGH (ref 10–40)
BILIRUB SERPL-MCNC: 1.3 MG/DL — HIGH (ref 0.2–1.2)
BILIRUB SERPL-MCNC: 1.4 MG/DL — HIGH (ref 0.2–1.2)
BILIRUB SERPL-MCNC: 1.7 MG/DL — HIGH (ref 0.2–1.2)
BUN SERPL-MCNC: 30 MG/DL — HIGH (ref 7–23)
BUN SERPL-MCNC: 35 MG/DL — HIGH (ref 7–23)
BUN SERPL-MCNC: 38 MG/DL — HIGH (ref 7–23)
CALCIUM SERPL-MCNC: 8.2 MG/DL — LOW (ref 8.4–10.5)
CALCIUM SERPL-MCNC: 8.3 MG/DL — LOW (ref 8.4–10.5)
CALCIUM SERPL-MCNC: 8.8 MG/DL — SIGNIFICANT CHANGE UP (ref 8.4–10.5)
CHLORIDE SERPL-SCNC: 100 MMOL/L — SIGNIFICANT CHANGE UP (ref 96–108)
CHLORIDE SERPL-SCNC: 101 MMOL/L — SIGNIFICANT CHANGE UP (ref 96–108)
CHLORIDE SERPL-SCNC: 102 MMOL/L — SIGNIFICANT CHANGE UP (ref 96–108)
CO2 SERPL-SCNC: 16 MMOL/L — LOW (ref 22–31)
CO2 SERPL-SCNC: 19 MMOL/L — LOW (ref 22–31)
CO2 SERPL-SCNC: 22 MMOL/L — SIGNIFICANT CHANGE UP (ref 22–31)
CREAT SERPL-MCNC: 1.62 MG/DL — HIGH (ref 0.5–1.3)
CREAT SERPL-MCNC: 2.09 MG/DL — HIGH (ref 0.5–1.3)
CREAT SERPL-MCNC: 2.32 MG/DL — HIGH (ref 0.5–1.3)
EGFR: 26 ML/MIN/1.73M2 — LOW
EGFR: 30 ML/MIN/1.73M2 — LOW
EGFR: 40 ML/MIN/1.73M2 — LOW
GAS PNL BLDA: SIGNIFICANT CHANGE UP
GLUCOSE SERPL-MCNC: 119 MG/DL — HIGH (ref 70–99)
GLUCOSE SERPL-MCNC: 121 MG/DL — HIGH (ref 70–99)
GLUCOSE SERPL-MCNC: 138 MG/DL — HIGH (ref 70–99)
GRAM STN FLD: SIGNIFICANT CHANGE UP
HCT VFR BLD CALC: 43.3 % — SIGNIFICANT CHANGE UP (ref 34.5–45)
HCT VFR BLD CALC: 43.9 % — SIGNIFICANT CHANGE UP (ref 34.5–45)
HGB BLD-MCNC: 14 G/DL — SIGNIFICANT CHANGE UP (ref 11.5–15.5)
HGB BLD-MCNC: 14.2 G/DL — SIGNIFICANT CHANGE UP (ref 11.5–15.5)
INR BLD: 3.52 RATIO — HIGH (ref 0.88–1.16)
MAGNESIUM SERPL-MCNC: 1.7 MG/DL — SIGNIFICANT CHANGE UP (ref 1.6–2.6)
MAGNESIUM SERPL-MCNC: 1.7 MG/DL — SIGNIFICANT CHANGE UP (ref 1.6–2.6)
MAGNESIUM SERPL-MCNC: 2.2 MG/DL — SIGNIFICANT CHANGE UP (ref 1.6–2.6)
MCHC RBC-ENTMCNC: 22.3 PG — LOW (ref 27–34)
MCHC RBC-ENTMCNC: 22.6 PG — LOW (ref 27–34)
MCHC RBC-ENTMCNC: 32.3 GM/DL — SIGNIFICANT CHANGE UP (ref 32–36)
MCHC RBC-ENTMCNC: 32.3 GM/DL — SIGNIFICANT CHANGE UP (ref 32–36)
MCV RBC AUTO: 68.9 FL — LOW (ref 80–100)
MCV RBC AUTO: 69.8 FL — LOW (ref 80–100)
MRSA PCR RESULT.: DETECTED
NRBC # BLD: 0 /100 WBCS — SIGNIFICANT CHANGE UP (ref 0–0)
NRBC # BLD: 0 /100 WBCS — SIGNIFICANT CHANGE UP (ref 0–0)
PHOSPHATE SERPL-MCNC: 3.8 MG/DL — SIGNIFICANT CHANGE UP (ref 2.5–4.5)
PHOSPHATE SERPL-MCNC: 4.6 MG/DL — HIGH (ref 2.5–4.5)
PHOSPHATE SERPL-MCNC: 4.9 MG/DL — HIGH (ref 2.5–4.5)
PLATELET # BLD AUTO: 293 K/UL — SIGNIFICANT CHANGE UP (ref 150–400)
PLATELET # BLD AUTO: 320 K/UL — SIGNIFICANT CHANGE UP (ref 150–400)
POTASSIUM SERPL-MCNC: 4 MMOL/L — SIGNIFICANT CHANGE UP (ref 3.5–5.3)
POTASSIUM SERPL-MCNC: 4.1 MMOL/L — SIGNIFICANT CHANGE UP (ref 3.5–5.3)
POTASSIUM SERPL-MCNC: 4.2 MMOL/L — SIGNIFICANT CHANGE UP (ref 3.5–5.3)
POTASSIUM SERPL-SCNC: 4 MMOL/L — SIGNIFICANT CHANGE UP (ref 3.5–5.3)
POTASSIUM SERPL-SCNC: 4.1 MMOL/L — SIGNIFICANT CHANGE UP (ref 3.5–5.3)
POTASSIUM SERPL-SCNC: 4.2 MMOL/L — SIGNIFICANT CHANGE UP (ref 3.5–5.3)
PROT SERPL-MCNC: 7 G/DL — SIGNIFICANT CHANGE UP (ref 6–8.3)
PROT SERPL-MCNC: 7 G/DL — SIGNIFICANT CHANGE UP (ref 6–8.3)
PROT SERPL-MCNC: 7.4 G/DL — SIGNIFICANT CHANGE UP (ref 6–8.3)
PROTHROM AB SERPL-ACNC: 41.3 SEC — HIGH (ref 10.5–13.4)
RBC # BLD: 6.2 M/UL — HIGH (ref 3.8–5.2)
RBC # BLD: 6.37 M/UL — HIGH (ref 3.8–5.2)
RBC # FLD: 18.2 % — HIGH (ref 10.3–14.5)
RBC # FLD: 18.2 % — HIGH (ref 10.3–14.5)
S AUREUS DNA NOSE QL NAA+PROBE: DETECTED
SODIUM SERPL-SCNC: 134 MMOL/L — LOW (ref 135–145)
SODIUM SERPL-SCNC: 135 MMOL/L — SIGNIFICANT CHANGE UP (ref 135–145)
SODIUM SERPL-SCNC: 137 MMOL/L — SIGNIFICANT CHANGE UP (ref 135–145)
SPECIMEN SOURCE: SIGNIFICANT CHANGE UP
UUN UR-MCNC: 244 MG/DL — SIGNIFICANT CHANGE UP
WBC # BLD: 13.02 K/UL — HIGH (ref 3.8–10.5)
WBC # BLD: 13.82 K/UL — HIGH (ref 3.8–10.5)
WBC # FLD AUTO: 13.02 K/UL — HIGH (ref 3.8–10.5)
WBC # FLD AUTO: 13.82 K/UL — HIGH (ref 3.8–10.5)

## 2022-08-30 PROCEDURE — 93308 TTE F-UP OR LMTD: CPT | Mod: 26,GC

## 2022-08-30 PROCEDURE — 99291 CRITICAL CARE FIRST HOUR: CPT | Mod: GC

## 2022-08-30 PROCEDURE — 99223 1ST HOSP IP/OBS HIGH 75: CPT | Mod: GC

## 2022-08-30 PROCEDURE — 76705 ECHO EXAM OF ABDOMEN: CPT | Mod: 26

## 2022-08-30 PROCEDURE — 93010 ELECTROCARDIOGRAM REPORT: CPT

## 2022-08-30 PROCEDURE — 36620 INSERTION CATHETER ARTERY: CPT

## 2022-08-30 PROCEDURE — 99291 CRITICAL CARE FIRST HOUR: CPT | Mod: GC,25

## 2022-08-30 RX ORDER — MAGNESIUM SULFATE 500 MG/ML
2 VIAL (ML) INJECTION ONCE
Refills: 0 | Status: COMPLETED | OUTPATIENT
Start: 2022-08-30 | End: 2022-08-30

## 2022-08-30 RX ORDER — CALCIUM GLUCONATE 100 MG/ML
2 VIAL (ML) INTRAVENOUS ONCE
Refills: 0 | Status: COMPLETED | OUTPATIENT
Start: 2022-08-30 | End: 2022-08-30

## 2022-08-30 RX ORDER — ACETAMINOPHEN 500 MG
325 TABLET ORAL ONCE
Refills: 0 | Status: COMPLETED | OUTPATIENT
Start: 2022-08-30 | End: 2022-08-30

## 2022-08-30 RX ORDER — SENNA PLUS 8.6 MG/1
2 TABLET ORAL AT BEDTIME
Refills: 0 | Status: DISCONTINUED | OUTPATIENT
Start: 2022-08-30 | End: 2022-09-14

## 2022-08-30 RX ORDER — FUROSEMIDE 40 MG
60 TABLET ORAL ONCE
Refills: 0 | Status: COMPLETED | OUTPATIENT
Start: 2022-08-30 | End: 2022-08-30

## 2022-08-30 RX ORDER — FENTANYL CITRATE 50 UG/ML
25 INJECTION INTRAVENOUS ONCE
Refills: 0 | Status: DISCONTINUED | OUTPATIENT
Start: 2022-08-30 | End: 2022-08-30

## 2022-08-30 RX ORDER — POLYETHYLENE GLYCOL 3350 17 G/17G
17 POWDER, FOR SOLUTION ORAL DAILY
Refills: 0 | Status: DISCONTINUED | OUTPATIENT
Start: 2022-08-30 | End: 2022-09-03

## 2022-08-30 RX ORDER — MUPIROCIN 20 MG/G
1 OINTMENT TOPICAL
Refills: 0 | Status: COMPLETED | OUTPATIENT
Start: 2022-08-30 | End: 2022-09-04

## 2022-08-30 RX ORDER — FUROSEMIDE 40 MG
60 TABLET ORAL EVERY 8 HOURS
Refills: 0 | Status: DISCONTINUED | OUTPATIENT
Start: 2022-08-30 | End: 2022-08-30

## 2022-08-30 RX ADMIN — FENTANYL CITRATE 25 MICROGRAM(S): 50 INJECTION INTRAVENOUS at 05:05

## 2022-08-30 RX ADMIN — Medication 100 MILLIGRAM(S): at 05:06

## 2022-08-30 RX ADMIN — FENTANYL CITRATE 25 MICROGRAM(S): 50 INJECTION INTRAVENOUS at 04:50

## 2022-08-30 RX ADMIN — Medication 200 GRAM(S): at 09:41

## 2022-08-30 RX ADMIN — Medication 7.82 MICROGRAM(S)/KG/MIN: at 09:40

## 2022-08-30 RX ADMIN — FENTANYL CITRATE 25 MICROGRAM(S): 50 INJECTION INTRAVENOUS at 10:05

## 2022-08-30 RX ADMIN — ONDANSETRON 4 MILLIGRAM(S): 8 TABLET, FILM COATED ORAL at 18:43

## 2022-08-30 RX ADMIN — FENTANYL CITRATE 25 MICROGRAM(S): 50 INJECTION INTRAVENOUS at 09:53

## 2022-08-30 RX ADMIN — Medication 10 MILLIGRAM(S): at 05:03

## 2022-08-30 RX ADMIN — POLYETHYLENE GLYCOL 3350 17 GRAM(S): 17 POWDER, FOR SOLUTION ORAL at 11:50

## 2022-08-30 RX ADMIN — CHLORHEXIDINE GLUCONATE 1 APPLICATION(S): 213 SOLUTION TOPICAL at 06:10

## 2022-08-30 RX ADMIN — Medication 100 MILLIGRAM(S): at 17:09

## 2022-08-30 RX ADMIN — PANTOPRAZOLE SODIUM 40 MILLIGRAM(S): 20 TABLET, DELAYED RELEASE ORAL at 06:10

## 2022-08-30 RX ADMIN — PREGABALIN 1000 MICROGRAM(S): 225 CAPSULE ORAL at 11:50

## 2022-08-30 RX ADMIN — Medication 325 MILLIGRAM(S): at 18:09

## 2022-08-30 RX ADMIN — Medication 325 MILLIGRAM(S): at 19:00

## 2022-08-30 RX ADMIN — Medication 500 MICROGRAM(S): at 11:07

## 2022-08-30 RX ADMIN — Medication 500 MICROGRAM(S): at 05:37

## 2022-08-30 RX ADMIN — Medication 60 MILLIGRAM(S): at 05:03

## 2022-08-30 RX ADMIN — CHLORHEXIDINE GLUCONATE 1 APPLICATION(S): 213 SOLUTION TOPICAL at 06:11

## 2022-08-30 RX ADMIN — Medication 500 MICROGRAM(S): at 17:02

## 2022-08-30 RX ADMIN — MUPIROCIN 1 APPLICATION(S): 20 OINTMENT TOPICAL at 18:31

## 2022-08-30 RX ADMIN — Medication 25 GRAM(S): at 09:41

## 2022-08-30 RX ADMIN — SPIRONOLACTONE 25 MILLIGRAM(S): 25 TABLET, FILM COATED ORAL at 05:03

## 2022-08-30 NOTE — CHART NOTE - NSCHARTNOTEFT_GEN_A_CORE
: Emeli/ Prakash    INDICATION: Shock     PROCEDURE:  [x ] LIMITED ECHO  [ ] LIMITED CHEST  [ ] LIMITED RETROPERITONEAL  [x ] LIMITED ABDOMINAL  [ ] LIMITED DVT  [ ] NEEDLE GUIDANCE VASCULAR  [ ] NEEDLE GUIDANCE THORACENTESIS  [ ] NEEDLE GUIDANCE PARACENTESIS  [ ] NEEDLE GUIDANCE PERICARDIOCENTESIS  [ ] OTHER    FINDINGS:    LV appears hyperdynamic in context of RV wall thickening. Bowing of interventricular septum noted on parasternal short view.     IVC 1.3 cm.    INTERPRETATION:    Images stored on [employer]. : Emeli/ Prakash    INDICATION: Shock     PROCEDURE:  [x ] LIMITED ECHO  [ ] LIMITED CHEST  [ ] LIMITED RETROPERITONEAL  [x ] LIMITED ABDOMINAL  [ ] LIMITED DVT  [ ] NEEDLE GUIDANCE VASCULAR  [ ] NEEDLE GUIDANCE THORACENTESIS  [ ] NEEDLE GUIDANCE PARACENTESIS  [ ] NEEDLE GUIDANCE PERICARDIOCENTESIS  [ ] OTHER    FINDINGS:    LV appears hyperdynamic in context of RV wall thickening with flattening of interventricular septum, though appears improved from yesterday    IVC 1.3 cm.    INTERPRETATION:  RV dilation with septal bowing of LV cavity  Images stored on [employer].    Attending Attestation:  I was present during the key portions of the procedure and immediately available during the entire procedure.  Chely Randall MD  Attending  Pulmonary & Critical Care Medicine

## 2022-08-30 NOTE — PROGRESS NOTE ADULT - ASSESSMENT
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 The patient came in with worsening nausea, vomiting, dyspnea. She came to ED and she was found to be hypotensive with signs of end organ damage. Repeat labs from today show shock liver and EDOUARD. She has signs of Right sided heart failure on exam, BNP 50261.     Patient taken to MICU and started on dobutamine, started on inhaled nitro and continued on remodulin with clinical improvement.

## 2022-08-30 NOTE — PROGRESS NOTE ADULT - ATTENDING COMMENTS
Briefly, 42F PMH PE on xarelto, pulmonary HTN (likely group I and group IV), ILD, JULIA, obesity presents to the hospital for 2 days of palpitations and abdominal pain/nausea/emesis. Denies any inciting event. Noted to have labile hypotension and labs notable for worsening transaminitis, BNP 9k (much higher than prior) as well as acute kidney injury. Feels improved since initiation of /Marcela. On exam, mild distress, JvP improved, prominent P2, tachycardic, CTAB, nontender abdomen, warm extremities. Labs reviewed - improving LFTs and renal function. TTE done which showed hyperdnamic and compressed LV, severe RV dysfunction, mod TR, dilated IVC. Overall stage D, NYHA class IV with severe pulmonary HTN.  - c/w Marcela at 10 ppm and dobutaine 2.5 mcg/kg/min  - c/w Remodulin; d/w Dr. Yoon possible transition to IV Remodulin   - diurese for goal negative   - will need to evaluate if patient candidate for transplant but unlikely d/t BMI  - c/w AC  - prognosis guarded

## 2022-08-30 NOTE — PROGRESS NOTE ADULT - SUBJECTIVE AND OBJECTIVE BOX
Interval History:  Started on , c/w ramodulin, inhaled nitro with improvement     Medications:  aluminum hydroxide/magnesium hydroxide/simethicone Suspension 30 milliLiter(s) Oral every 4 hours PRN  artificial  tears Solution 1 Drop(s) Both EYES three times a day PRN  aztreonam  IVPB 2000 milliGRAM(s) IV Intermittent every 12 hours  chlorhexidine 2% Cloths 1 Application(s) Topical <User Schedule>  chlorhexidine 4% Liquid 1 Application(s) Topical <User Schedule>  cyanocobalamin 1000 MICROGram(s) Oral daily  DOBUTamine Infusion 2.5 MICROgram(s)/kG/Min IV Continuous <Continuous>  ipratropium    for Nebulization 500 MICROGram(s) Nebulizer every 6 hours  melatonin 3 milliGRAM(s) Oral at bedtime PRN  ondansetron Injectable 4 milliGRAM(s) IV Push every 8 hours PRN  pantoprazole    Tablet 40 milliGRAM(s) Oral before breakfast  polyethylene glycol 3350 17 Gram(s) Oral daily  predniSONE   Tablet 10 milliGRAM(s) Oral daily  Remodulin (Treprostinil) 5mG/mL 1 Vial(s) 1 Vial(s) SubCutaneous Continuous Pump  senna 2 Tablet(s) Oral at bedtime  sodium chloride 0.9% lock flush 10 milliLiter(s) IV Push every 1 hour PRN  spironolactone 25 milliGRAM(s) Oral daily      Vitals:  T(C): 36.5 (22 @ 08:00), Max: 36.7 (22 @ 04:00)  HR: 108 (22 @ 11:20) (101 - 115)  BP: 117/59 (22 @ 04:00) (107/55 - 138/80)  BP(mean): 83 (22 @ 04:00) (72 - 94)  RR: 16 (22 @ 11:00) (15 - 31)  SpO2: 99% (22 @ 11:20) (89% - 100%)    Daily     Daily Weight in k (29 Aug 2022 18:00)        I&O's Summary    29 Aug 2022 07:01  -  30 Aug 2022 07:00  --------------------------------------------------------  IN: 898 mL / OUT: 1250 mL / NET: -352 mL    30 Aug 2022 07:01  -  30 Aug 2022 12:46  --------------------------------------------------------  IN: 115.6 mL / OUT: 900 mL / NET: -784.4 mL        Physical Exam:  Appearance: No Acute Distress  HEENT: PERRL  Neck: JVD improved   Cardiovascular: RRR  Respiratory: b/l crackles   Gastrointestinal: Soft, Non-tender	  Skin: No cyanosis	  Neurologic: Non-focal  Extremities: edema improved   Psychiatry: A & O x 3, Mood & affect appropriate    Labs:                        14.2   13.82 )-----------( 320      ( 30 Aug 2022 05:31 )             43.9     08-30    137  |  102  |  35<H>  ----------------------------<  121<H>  4.0   |  19<L>  |  2.09<H>    Ca    8.3<L>      30 Aug 2022 05:31  Phos  4.6     08-30  Mg     1.7     08-30    TPro  7.4  /  Alb  4.0  /  TBili  1.4<H>  /  DBili  x   /  AST  1170<H>  /  ALT  1573<H>  /  AlkPhos  76  08-30    PT/INR - ( 30 Aug 2022 05:31 )   PT: 41.3 sec;   INR: 3.52 ratio         PTT - ( 30 Aug 2022 05:31 )  PTT:33.5 sec  CARDIAC MARKERS ( 28 Aug 2022 19:33 )  x     / x     / 82 U/L / x     / 2.5 ng/mL

## 2022-08-30 NOTE — PROGRESS NOTE ADULT - PROBLEM SELECTOR PLAN 3
Started on inhaled nitro and continued on remodulin, Patient's pulmonologist is aware. Reassessment for lung transplant

## 2022-08-30 NOTE — PROGRESS NOTE ADULT - SUBJECTIVE AND OBJECTIVE BOX
CHIEF COMPLAINT:    Interval Events:    REVIEW OF SYSTEMS:  Constitutional: [ ] fevers [ ] chills [ ] weight loss [ ] weight gain  HEENT: [ ] dry eyes [ ] eye irritation [ ] postnasal drip [ ] nasal congestion  CV: [ ] chest pain [ ] orthopnea [ ] palpitations [ ] murmur  Resp: [ ] cough [ ] shortness of breath [ ] dyspnea [ ] wheezing [ ] sputum [ ] hemoptysis  GI: [ ] nausea [ ] vomiting [ ] diarrhea [ ] constipation [ ] abd pain [ ] dysphagia   : [ ] dysuria [ ] nocturia [ ] hematuria [ ] increased urinary frequency  Musculoskeletal: [ ] back pain [ ] myalgias [ ] arthralgias [ ] fracture  Skin: [ ] rash [ ] itch  Neurological: [ ] headache [ ] dizziness [ ] syncope [ ] weakness [ ] numbness  Psychiatric: [ ] anxiety [ ] depression  Endocrine: [ ] diabetes [ ] thyroid problem  Hematologic/Lymphatic: [ ] anemia [ ] bleeding problem  Allergic/Immunologic: [ ] itchy eyes [ ] nasal discharge [ ] hives [ ] angioedema  [ ] All other systems negative  [ ] Unable to assess ROS because ________    OBJECTIVE:  ICU Vital Signs Last 24 Hrs  T(C): 36.5 (30 Aug 2022 08:00), Max: 36.8 (29 Aug 2022 11:39)  T(F): 97.7 (30 Aug 2022 08:00), Max: 98.2 (29 Aug 2022 11:39)  HR: 110 (30 Aug 2022 08:52) (101 - 115)  BP: 117/59 (30 Aug 2022 04:00) (80/41 - 138/80)  BP(mean): 83 (30 Aug 2022 04:00) (72 - 94)  ABP: 115/66 (30 Aug 2022 08:00) (98/54 - 145/72)  ABP(mean): 78 (30 Aug 2022 08:00) (65 - 96)  RR: 24 (30 Aug 2022 08:52) (15 - 31)  SpO2: 98% (30 Aug 2022 08:52) (89% - 100%)    O2 Parameters below as of 30 Aug 2022 08:52  Patient On (Oxygen Delivery Method): nasal cannula  O2 Flow (L/min): 4          I & O's    -29 @ 07: @ 07:00  --------------------------------------------------------  IN: 898 mL / OUT: 1250 mL / NET: -352 mL     @ 07: @ 08:54  --------------------------------------------------------  IN: 7.8 mL / OUT: 0 mL / NET: 7.8 mL      CAPILLARY BLOOD GLUCOSE      POCT Blood Glucose.: 115 mg/dL (28 Aug 2022 19:05)      PHYSICAL EXAM:  General:   HEENT:   Lymph Nodes:  Neck:   Respiratory:   Cardiovascular:   Abdomen:   Extremities:   Skin:   Neurological:  Psychiatry:    LINES:    HOSPITAL MEDICATIONS:  MEDICATIONS  (STANDING):  aztreonam  IVPB 2000 milliGRAM(s) IV Intermittent every 12 hours  calcium gluconate IVPB 2 Gram(s) IV Intermittent once  chlorhexidine 2% Cloths 1 Application(s) Topical <User Schedule>  chlorhexidine 4% Liquid 1 Application(s) Topical <User Schedule>  cyanocobalamin 1000 MICROGram(s) Oral daily  DOBUTamine Infusion 2.5 MICROgram(s)/kG/Min (7.82 mL/Hr) IV Continuous <Continuous>  furosemide   Injectable 60 milliGRAM(s) IV Push every 8 hours  ipratropium    for Nebulization 500 MICROGram(s) Nebulizer every 6 hours  magnesium sulfate  IVPB 2 Gram(s) IV Intermittent once  pantoprazole    Tablet 40 milliGRAM(s) Oral before breakfast  predniSONE   Tablet 10 milliGRAM(s) Oral daily  Remodulin (Treprostinil) 5mG/mL 1 Vial(s) 1 Vial(s) SubCutaneous Continuous Pump  rivaroxaban 10 milliGRAM(s) Oral daily  spironolactone 25 milliGRAM(s) Oral daily    MEDICATIONS  (PRN):  aluminum hydroxide/magnesium hydroxide/simethicone Suspension 30 milliLiter(s) Oral every 4 hours PRN Dyspepsia  artificial  tears Solution 1 Drop(s) Both EYES three times a day PRN Dry Eyes  melatonin 3 milliGRAM(s) Oral at bedtime PRN Insomnia  ondansetron Injectable 4 milliGRAM(s) IV Push every 8 hours PRN Nausea and/or Vomiting  sodium chloride 0.9% lock flush 10 milliLiter(s) IV Push every 1 hour PRN Pre/post blood products, medications, blood draw, and to maintain line patency      LABS:                        14.2   13.82 )-----------( 320      ( 30 Aug 2022 05:31 )             43.9     Hgb Trend: 14.2<--, 14.0<--, 15.2<--, 14.9<--, 14.9<--      137  |  102  |  35<H>  ----------------------------<  121<H>  4.0   |  19<L>  |  2.09<H>    Ca    8.3<L>      30 Aug 2022 05:31  Phos  4.6       Mg     1.7         TPro  7.4  /  Alb  4.0  /  TBili  1.4<H>  /  DBili  x   /  AST  1170<H>  /  ALT  1573<H>  /  AlkPhos  76      Creatinine Trend: 2.09<--, 2.32<--, 3.05<--, 2.53<--, 1.48<--, 1.21<--  PT/INR - ( 30 Aug 2022 05:31 )   PT: 41.3 sec;   INR: 3.52 ratio         PTT - ( 30 Aug 2022 05:31 )  PTT:33.5 sec  Urinalysis Basic - ( 29 Aug 2022 22:14 )    Color: Yellow / Appearance: Clear / S.012 / pH: x  Gluc: x / Ketone: Negative  / Bili: Negative / Urobili: Negative   Blood: x / Protein: Trace / Nitrite: Negative   Leuk Esterase: Negative / RBC: 14 /hpf / WBC 4 /HPF   Sq Epi: x / Non Sq Epi: 0 /hpf / Bacteria: Negative      Arterial Blood Gas:   @ 05:29  7.41/29/125/18/99.3/-4.9  ABG lactate: --  Arterial Blood Gas:   @ 23:55  7.36/31/77/18/95.8/-6.7  ABG lactate: --  Arterial Blood Gas:   @ 22:00  7.32/28/78/14/95.8/-10.1  ABG lactate: --  Arterial Blood Gas:   @ 19:31  7.37/23/94/13/98.5/-9.7  ABG lactate: --    Venous Blood Gas:   @ 18:30  7.13/47/31/16/35.0  VBG Lactate: 4.9  Venous Blood Gas:   @ 11:23  7.32/42/24/22/24.8  VBG Lactate: 2.7      MICROBIOLOGY:     RADIOLOGY:  [ ] Reviewed and interpreted by me    EKG: CHIEF COMPLAINT: Hypotension     HPI / INTERVAL HISTORY: 42F PMH PE on xarelto, pHTN, ILD, JULIA, right sided heart failure, presents to the hospital for 2 days of palpitations. Patient reports she has had similar episodes of palpitations in the past, however workup in the past was unremarkable. Pt a/w c/o mild orthopnea, was found to be tachycardic (ST), anemic, non con chest CT noted pulmonary artery dilation most likey    Patient also reporting mild orthopnea. Denies chest pain. Denies diaphoresis. Denies recent use of stimulants including caffeine. Patient also reporting severe nausea and vomiting which has resolved with zofran in the ED. ROS otherwise unremarkable.    ED course: VS with tachycardia. CBC with anemia. CMP normal. BNP 7646. Troponin T normal. EKG with sinus tachycardia, CXR without consolidation. CT chest noncon revealed pulmonary artery dilations likely secondary to pulmonary hypertension. Patient now for admission to medicine for further evaluation and treatment.     RRT called on  for hypotension (SBP 80s) w/ tachycardia to the 130s, s/p 50 cc of LR that was discontinued i/s/o significant pHTN. MICU consulted at this time. SBP improved to 100s. MICU did not accept at this time given improvement of SBP - recommend HF consult and echo given hx of pHTN w/ RV failure, RUQ US given concern for cholecystitis vs hepatic congestion, and cardio consult if chest pain worsens/recurs. On , patient had additional episode of hypotension (BP 80/41) w/ tachycardia to 108. Patient deemed candidate for MICU given hemodynamic instability w/ signs of end-organ dysfunction and concern for septic shock vs cardiogenic shock.         CHIEF COMPLAINT:    Interval Events:    REVIEW OF SYSTEMS:  Constitutional: [ ] fevers [ ] chills [ ] weight loss [ ] weight gain  HEENT: [ ] dry eyes [ ] eye irritation [ ] postnasal drip [ ] nasal congestion  CV: [ ] chest pain [ ] orthopnea [ ] palpitations [ ] murmur  Resp: [ ] cough [ ] shortness of breath [ ] dyspnea [ ] wheezing [ ] sputum [ ] hemoptysis  GI: [ ] nausea [ ] vomiting [ ] diarrhea [ ] constipation [ ] abd pain [ ] dysphagia   : [ ] dysuria [ ] nocturia [ ] hematuria [ ] increased urinary frequency  Musculoskeletal: [ ] back pain [ ] myalgias [ ] arthralgias [ ] fracture  Skin: [ ] rash [ ] itch  Neurological: [ ] headache [ ] dizziness [ ] syncope [ ] weakness [ ] numbness  Psychiatric: [ ] anxiety [ ] depression  Endocrine: [ ] diabetes [ ] thyroid problem  Hematologic/Lymphatic: [ ] anemia [ ] bleeding problem  Allergic/Immunologic: [ ] itchy eyes [ ] nasal discharge [ ] hives [ ] angioedema  [ ] All other systems negative  [ ] Unable to assess ROS because ________    OBJECTIVE:  ICU Vital Signs Last 24 Hrs  T(C): 36.5 (30 Aug 2022 08:00), Max: 36.8 (29 Aug 2022 11:39)  T(F): 97.7 (30 Aug 2022 08:00), Max: 98.2 (29 Aug 2022 11:39)  HR: 110 (30 Aug 2022 08:52) (101 - 115)  BP: 117/59 (30 Aug 2022 04:00) (80/41 - 138/80)  BP(mean): 83 (30 Aug 2022 04:00) (72 - 94)  ABP: 115/66 (30 Aug 2022 08:00) (98/54 - 145/72)  ABP(mean): 78 (30 Aug 2022 08:00) (65 - 96)  RR: 24 (30 Aug 2022 08:52) (15 - 31)  SpO2: 98% (30 Aug 2022 08:52) (89% - 100%)    O2 Parameters below as of 30 Aug 2022 08:52  Patient On (Oxygen Delivery Method): nasal cannula  O2 Flow (L/min): 4          I & O's     @ :  -   @ 07:00  --------------------------------------------------------  IN: 898 mL / OUT: 1250 mL / NET: -352 mL     @ 07:01  -   @ 08:54  --------------------------------------------------------  IN: 7.8 mL / OUT: 0 mL / NET: 7.8 mL      CAPILLARY BLOOD GLUCOSE      POCT Blood Glucose.: 115 mg/dL (28 Aug 2022 19:05)      PHYSICAL EXAM:  General:   HEENT:   Lymph Nodes:  Neck:   Respiratory:   Cardiovascular:   Abdomen:   Extremities:   Skin:   Neurological:  Psychiatry:    LINES:    HOSPITAL MEDICATIONS:  MEDICATIONS  (STANDING):  aztreonam  IVPB 2000 milliGRAM(s) IV Intermittent every 12 hours  calcium gluconate IVPB 2 Gram(s) IV Intermittent once  chlorhexidine 2% Cloths 1 Application(s) Topical <User Schedule>  chlorhexidine 4% Liquid 1 Application(s) Topical <User Schedule>  cyanocobalamin 1000 MICROGram(s) Oral daily  DOBUTamine Infusion 2.5 MICROgram(s)/kG/Min (7.82 mL/Hr) IV Continuous <Continuous>  furosemide   Injectable 60 milliGRAM(s) IV Push every 8 hours  ipratropium    for Nebulization 500 MICROGram(s) Nebulizer every 6 hours  magnesium sulfate  IVPB 2 Gram(s) IV Intermittent once  pantoprazole    Tablet 40 milliGRAM(s) Oral before breakfast  predniSONE   Tablet 10 milliGRAM(s) Oral daily  Remodulin (Treprostinil) 5mG/mL 1 Vial(s) 1 Vial(s) SubCutaneous Continuous Pump  rivaroxaban 10 milliGRAM(s) Oral daily  spironolactone 25 milliGRAM(s) Oral daily    MEDICATIONS  (PRN):  aluminum hydroxide/magnesium hydroxide/simethicone Suspension 30 milliLiter(s) Oral every 4 hours PRN Dyspepsia  artificial  tears Solution 1 Drop(s) Both EYES three times a day PRN Dry Eyes  melatonin 3 milliGRAM(s) Oral at bedtime PRN Insomnia  ondansetron Injectable 4 milliGRAM(s) IV Push every 8 hours PRN Nausea and/or Vomiting  sodium chloride 0.9% lock flush 10 milliLiter(s) IV Push every 1 hour PRN Pre/post blood products, medications, blood draw, and to maintain line patency      LABS:                        14.2   13.82 )-----------( 320      ( 30 Aug 2022 05:31 )             43.9     Hgb Trend: 14.2<--, 14.0<--, 15.2<--, 14.9<--, 14.9<--      137  |  102  |  35<H>  ----------------------------<  121<H>  4.0   |  19<L>  |  2.09<H>    Ca    8.3<L>      30 Aug 2022 05:31  Phos  4.6       Mg     1.7         TPro  7.4  /  Alb  4.0  /  TBili  1.4<H>  /  DBili  x   /  AST  1170<H>  /  ALT  1573<H>  /  AlkPhos  76      Creatinine Trend: 2.09<--, 2.32<--, 3.05<--, 2.53<--, 1.48<--, 1.21<--  PT/INR - ( 30 Aug 2022 05:31 )   PT: 41.3 sec;   INR: 3.52 ratio         PTT - ( 30 Aug 2022 05:31 )  PTT:33.5 sec  Urinalysis Basic - ( 29 Aug 2022 22:14 )    Color: Yellow / Appearance: Clear / S.012 / pH: x  Gluc: x / Ketone: Negative  / Bili: Negative / Urobili: Negative   Blood: x / Protein: Trace / Nitrite: Negative   Leuk Esterase: Negative / RBC: 14 /hpf / WBC 4 /HPF   Sq Epi: x / Non Sq Epi: 0 /hpf / Bacteria: Negative      Arterial Blood Gas:   @ 05:29  7.41/29/125/18/99.3/-4.9  ABG lactate: --  Arterial Blood Gas:   @ 23:55  7.36/31/77/18/95.8/-6.7  ABG lactate: --  Arterial Blood Gas:   @ 22:00  7.32/28/78/14/95.8/-10.1  ABG lactate: --  Arterial Blood Gas:   @ 19:31  7.37/23/94/13/98.5/-9.7  ABG lactate: --    Venous Blood Gas:   @ 18:30  7.13/47/31/16/35.0  VBG Lactate: 4.9  Venous Blood Gas:   @ 11:23  7.32/42/24/22/24.8  VBG Lactate: 2.7      MICROBIOLOGY:     RADIOLOGY:  [ ] Reviewed and interpreted by me    EKG: CHIEF COMPLAINT: Hypotension     HPI / INTERVAL HISTORY: 42F PMH PE on xarelto, pHTN, ILD, JULIA, right sided heart failure, presents to the hospital for 2 days of palpitations. Patient reports she has had similar episodes of palpitations in the past, however workup in the past was unremarkable. Pt a/w c/o mild orthopnea, was found to be tachycardic (ST), anemic, non con chest CT noted pulmonary artery dilation most likely pHTN. BNP 7646,  CT chest noncon revealed pulmonary artery dilations likely secondary to pulmonary hypertension. Patient admitted for admission to medicine for further evaluation and treatment.   RRT called on  for hypotension (SBP 80s) w/ tachycardia to the 130s, s/p 50 cc of LR that was discontinued i/s/o significant pHTN. MICU consulted at this time. SBP improved to 100s. MICU did not accept at this time given improvement of SBP - recommend HF consult and echo given hx of pHTN w/ RV failure, RUQ US given concern for cholecystitis vs hepatic congestion, and cardio consult if chest pain worsens/recurs. On , patient had additional episode of hypotension (BP 80/41) w/ tachycardia to 108. Patient deemed candidate for MICU given hemodynamic instability w/ signs of end-organ dysfunction and concern for septic shock vs cardiogenic shock, and is now on dobutamine, diuresed, and on NO3 10PPM.    REVIEW OF SYSTEMS:  [x ] All other systems negative- c/o some RUQ discomfort    OBJECTIVE:  ICU Vital Signs Last 24 Hrs  T(C): 36.5 (30 Aug 2022 08:00), Max: 36.8 (29 Aug 2022 11:39)  T(F): 97.7 (30 Aug 2022 08:00), Max: 98.2 (29 Aug 2022 11:39)  HR: 110 (30 Aug 2022 08:52) (101 - 115)  BP: 117/59 (30 Aug 2022 04:00) (80/41 - 138/80)  BP(mean): 83 (30 Aug 2022 04:00) (72 - 94)  ABP: 115/66 (30 Aug 2022 08:00) (98/54 - 145/72)  ABP(mean): 78 (30 Aug 2022 08:00) (65 - 96)  RR: 24 (30 Aug 2022 08:52) (15 - 31)  SpO2: 98% (30 Aug 2022 08:52) (89% - 100%)    O2 Parameters below as of 30 Aug 2022 08:52  Patient On (Oxygen Delivery Method): nasal cannula  O2 Flow (L/min): 4      I & O's     @ 07: @ 07:00  --------------------------------------------------------  IN: 898 mL / OUT: 1250 mL / NET: -352 mL     @ 07: @ 08:54  --------------------------------------------------------  IN: 7.8 mL / OUT: 0 mL / NET: 7.8 mL      CAPILLARY BLOOD GLUCOSE  POCT Blood Glucose.: 115 mg/dL (28 Aug 2022 19:05)      PHYSICAL EXAM:  General:   HEENT:   Lymph Nodes:  Neck:   Respiratory:   Cardiovascular:   Abdomen:   Extremities:   Skin:   Neurological:  Psychiatry:    LINES:    HOSPITAL MEDICATIONS:  MEDICATIONS  (STANDING):  aztreonam  IVPB 2000 milliGRAM(s) IV Intermittent every 12 hours  calcium gluconate IVPB 2 Gram(s) IV Intermittent once  chlorhexidine 2% Cloths 1 Application(s) Topical <User Schedule>  chlorhexidine 4% Liquid 1 Application(s) Topical <User Schedule>  cyanocobalamin 1000 MICROGram(s) Oral daily  DOBUTamine Infusion 2.5 MICROgram(s)/kG/Min (7.82 mL/Hr) IV Continuous <Continuous>  furosemide   Injectable 60 milliGRAM(s) IV Push every 8 hours  ipratropium    for Nebulization 500 MICROGram(s) Nebulizer every 6 hours  magnesium sulfate  IVPB 2 Gram(s) IV Intermittent once  pantoprazole    Tablet 40 milliGRAM(s) Oral before breakfast  predniSONE   Tablet 10 milliGRAM(s) Oral daily  Remodulin (Treprostinil) 5mG/mL 1 Vial(s) 1 Vial(s) SubCutaneous Continuous Pump  rivaroxaban 10 milliGRAM(s) Oral daily  spironolactone 25 milliGRAM(s) Oral daily    MEDICATIONS  (PRN):  aluminum hydroxide/magnesium hydroxide/simethicone Suspension 30 milliLiter(s) Oral every 4 hours PRN Dyspepsia  artificial  tears Solution 1 Drop(s) Both EYES three times a day PRN Dry Eyes  melatonin 3 milliGRAM(s) Oral at bedtime PRN Insomnia  ondansetron Injectable 4 milliGRAM(s) IV Push every 8 hours PRN Nausea and/or Vomiting  sodium chloride 0.9% lock flush 10 milliLiter(s) IV Push every 1 hour PRN Pre/post blood products, medications, blood draw, and to maintain line patency      LABS:                        14.2   13.82 )-----------( 320      ( 30 Aug 2022 05:31 )             43.9     Hgb Trend: 14.2<--, 14.0<--, 15.2<--, 14.9<--, 14.9<--  08-30    137  |  102  |  35<H>  ----------------------------<  121<H>  4.0   |  19<L>  |  2.09<H>    Ca    8.3<L>      30 Aug 2022 05:31  Phos  4.6     0830  Mg     1.7         TPro  7.4  /  Alb  4.0  /  TBili  1.4<H>  /  DBili  x   /  AST  1170<H>  /  ALT  1573<H>  /  AlkPhos  76  0830    Creatinine Trend: 2.09<--, 2.32<--, 3.05<--, 2.53<--, 1.48<--, 1.21<--  PT/INR - ( 30 Aug 2022 05:31 )   PT: 41.3 sec;   INR: 3.52 ratio         PTT - ( 30 Aug 2022 05:31 )  PTT:33.5 sec  Urinalysis Basic - ( 29 Aug 2022 22:14 )    Color: Yellow / Appearance: Clear / S.012 / pH: x  Gluc: x / Ketone: Negative  / Bili: Negative / Urobili: Negative   Blood: x / Protein: Trace / Nitrite: Negative   Leuk Esterase: Negative / RBC: 14 /hpf / WBC 4 /HPF   Sq Epi: x / Non Sq Epi: 0 /hpf / Bacteria: Negative      Arterial Blood Gas:   @ 05:29  7.41/29/125/18/99.3/-4.9  ABG lactate: --  Arterial Blood Gas:   @ 23:55  7.36/31/77/18/95.8/-6.7  ABG lactate: --  Arterial Blood Gas:   @ 22:00  7.32/28/78/14/95.8/-10.1  ABG lactate: --  Arterial Blood Gas:   @ 19:31  7.37/23/94/13/98.5/-9.7  ABG lactate: --    Venous Blood Gas:   @ 18:30  7.13/47/31/16/35.0  VBG Lactate: 4.9  Venous Blood Gas:   @ 11:23  7.32/42/24/22/24.8  VBG Lactate: 2.7    MICROBIOLOGY:   mMRSA/MSSA PCR (22 @ 19:22)   MRSA PCR Result.: Detected: MRSA/MSSA PCR assay is a qualitative in vitro diagnostic test for the Respiratory Viral Panel with COVID-19 by GLENN (22 @ 12:57)   Rapid RVP Result: NotDetec     RADIOLOGY:  < from: US Abdomen Upper Quadrant Right (22 @ 12:44) >    ACC: [id number] EXAM:  US ABDOMEN RT UPR QUADRANT                          PROCEDURE DATE:  2022        INTERPRETATION:  CLINICAL INFORMATION: Right upper quadrant pain.    COMPARISON: Chest CT dated 6/15/2022.    TECHNIQUE: Sonography of theright upper quadrant.    FINDINGS:  Liver: Normal size. Fatty infiltration. No liver mass.  Bile ducts: Normal caliber. Common bile duct measures 7 mm.  Gallbladder: Within normal limits.  Pancreas: Visualized portions are within normal limits.  Right kidney: 10.5 cm. No hydronephrosis.  Ascites: None.  IVC: Visualized portions are within normal limits.    IMPRESSION:  Mild hepatic steatosis.    No evidence of gallbladder or biliary disease.    < from: Transthoracic Echocardiogram (22 @ 15:46) >    Patient name: ROXI MARIA  YOB: 1980   Age: 42 (F)   MR#: [id number]  Study Date: 2022  Location: [address] [place], [place] [id number]: [facility]  Study quality: Technically fair  Referring Physician: Tiffanie Maier MD  Blood Pressure: 80/41 mmHg  Height: 165 cm  Weight: 104 kg  BSA: 2.1 m2  Heart Rate: 106 mmHg  ------------------------------------------------------------------------  PROCEDURE: Transthoracic echocardiogram with 2-D, M-Mode  and complete spectral and color flow Doppler.  INDICATION: Heart failure, unspecified (I50.9)  ------------------------------------------------------------------------  Dimensions:    Normal Values:  LA:     2.5    2.0 - 4.0 cm  Ao:     3.1    2.0 - 3.8 cm  SEPTUM: 1.5    0.6 - 1.2 cm  PWT:    1.3    0.6 - 1.1 cm  LVIDd:  1.3    3.0 - 5.6 cm  LVIDs:  0.7    1.8 - 4.0 cm  Derived variables:  LVMI: 27 g/m2  RWT: 2.00  Fractional short: 46 %  EF (Visual Estimate): NA %  ------------------------------------------------------------------------  Observations:  Mitral Valve: Normal mitral valve.  No mitral regurgitation  seen.  Aortic Valve/Aorta: Normal trileaflet aortic valve.No  aortic stenosis. No aortic valve regurgitation seen. Peak  left ventricular outflow tract gradient equals 1mm Hg,  mean gradient is equal to 1 mm Hg, LVOT velocity time  integral equals 8 cm.  Aortic Root: 3.1 cm.  Ascending Aorta: 2.7 cm.  LVOT diameter: 2.1 cm.  Left Atrium: LA volume index = 5 cc/m2. The left atrium  appears very small and contracted.  Left Ventricle: Normal left ventricular systolic function.  No segmental wall motion abnormalities. LV cavity appears  small and contracted. Unable to assess LVEF since it is  such a small cavity. Visually, it is probably normal.  Flattening of the interventricular septum in both systole  and diastole is consistent with right ventricular pressure  overload. Mild concentric left ventricular hypertrophy.  Mild diastolic dysfunction (Stage I).  Right Heart: Severe right atrial enlargement. The IAS is  bowed to the left consistent with elevated right sided  pressures. Right ventricular enlargement with decreased  right ventricular systolic function. The RV is severely  dilated.  RV is hypertrophied (measurin.1cm) A device  wire is noted in the right heart. PI end diastolic pressure  was 37 mmHg hence PA end diastolic pressure is at least 57  mmHg.  TAPSE was 1.1 cm. RV S' was 7.2 cm/s. Normal tricuspid  valve. Severe tricuspid regurgitation. Normal pulmonic  valve. Moderate pulmonic regurgitation.  Pericardium/Pleura: Small loculated pericardial effusion  anterior to the right ventricle.  Hemodynamic: Estimated right ventricular systolic pressure  equals 96 mm Hg, assuming right atrial pressure equals > 15  mm Hg, consistent with severe pulmonary hypertension.  ------------------------------------------------------------------------  Conclusions:  1. No mitral regurgitation seen.  2. LA volume index = 5 cc/m2. The left atrium appears very  small and contracted.  3. Mild concentricleft ventricular hypertrophy.Normal left  ventricular systolic function. No segmental wall motion  abnormalities. LV cavity appears small and contracted.  Flattening of the interventricular septum in both systole  and diastole is consistent with right ventricular pressure  overload.  Unable to assess LVEF since it is such a small cavity.  Visually, it is probably normal.  5. Severe right atrial enlargement. The IAS is bowed to the  left consistent with elevated right sided pressures.  6. Right ventricular enlargement with decreased right  ventricular systolic function. The RV is severely dilated.  RV is hypertrophied (measurin.1cm) A device wire is  noted in the right heart. PI end diastolic pressure was 37  mmHg hence PA end diastolic pressure is at least 57 mmHg.  TAPSE was 1.1 cm. RV S' was 7.2 cm/s.  7. Normal tricuspid valve. Severe tricuspid regurgitation.  Estimated pulmonary artery systolic pressure equals 96  mm  Hg, assuming right atrial pressure equals > 15 mm Hg,  consistent with severe pulmonary pressures.  8. Normal pulmonic valve. Moderate pulmonic regurgitation.  9. Small loculated pericardial effusion anterior to the  right ventricle.  *** Compared with echocardiogram of 2020, RV has  increased in size and RVfunction has decreased. There is  now severe tricuspid regurgitation. RVSP has increased and  PA end diastolilc pressure is high. There is a small  loculated pericardial effusion. The LV is very small and          EKG: CHIEF COMPLAINT: Hypotension     HPI / INTERVAL HISTORY: 42F PMH PE on xarelto, pHTN, ILD, JULIA, right sided heart failure, presents to the hospital for 2 days of palpitations. Patient reports she has had similar episodes of palpitations in the past, however workup in the past was unremarkable. Pt a/w c/o mild orthopnea, was found to be tachycardic (ST), anemic, non con chest CT noted pulmonary artery dilation most likely pHTN. BNP 7646,  CT chest noncon revealed pulmonary artery dilations likely secondary to pulmonary hypertension. Patient admitted for admission to medicine for further evaluation and treatment.   RRT called on  for hypotension (SBP 80s) w/ tachycardia to the 130s, s/p 50 cc of LR that was discontinued i/s/o significant pHTN. MICU consulted at this time. SBP improved to 100s. MICU did not accept at this time given improvement of SBP - recommend HF consult and echo given hx of pHTN w/ RV failure, RUQ US given concern for cholecystitis vs hepatic congestion, and cardio consult if chest pain worsens/recurs. On , patient had additional episode of hypotension (BP 80/41) w/ tachycardia to 108. Patient deemed candidate for MICU given hemodynamic instability w/ signs of end-organ dysfunction and concern for septic shock vs cardiogenic shock, and is now on dobutamine, diuresed, and on NO3 10PPM.    REVIEW OF SYSTEMS:  [x ] All other systems negative- c/o some RUQ discomfort    OBJECTIVE:  ICU Vital Signs Last 24 Hrs  T(C): 36.5 (30 Aug 2022 08:00), Max: 36.8 (29 Aug 2022 11:39)  T(F): 97.7 (30 Aug 2022 08:00), Max: 98.2 (29 Aug 2022 11:39)  HR: 110 (30 Aug 2022 08:52) (101 - 115)  BP: 117/59 (30 Aug 2022 04:00) (80/41 - 138/80)  BP(mean): 83 (30 Aug 2022 04:00) (72 - 94)  ABP: 115/66 (30 Aug 2022 08:00) (98/54 - 145/72)  ABP(mean): 78 (30 Aug 2022 08:00) (65 - 96)  RR: 24 (30 Aug 2022 08:52) (15 - 31)  SpO2: 98% (30 Aug 2022 08:52) (89% - 100%)    O2 Parameters below as of 30 Aug 2022 08:52  Patient On (Oxygen Delivery Method): nasal cannula  O2 Flow (L/min): 4      I & O's     @ 07:  -   @ 07:00  --------------------------------------------------------  IN: 898 mL / OUT: 1250 mL / NET: -352 mL     @ 07:  -   @ 08:54  --------------------------------------------------------  IN: 7.8 mL / OUT: 0 mL / NET: 7.8 mL      CAPILLARY BLOOD GLUCOSE  POCT Blood Glucose.: 115 mg/dL (28 Aug 2022 19:05)      PHYSICAL EXAM:  General: pleasant, appears w/o any distress, well groomed  HEENT: normocephalic, sclera white, conjunctiva clear, oral mucosa moist /pink  Lymph Nodes: non palp  Neck: no noted JVD, supple, short  Respiratory: radha equal BS, diminished BS at the bases, BS distant BS 2/2 body habitus, remains on 4L NC / NO3 10 PPM  Cardiovascular: S1S2 RRR, ST - NSR w/o ectopy  Abdomen: obese, soft , ND, no tender  Extremities: 2+ pitting edema  Skin: warm , dry  Neurological: no neuro def      LINES: TLC/ R [facility]    HOSPITAL MEDICATIONS:  MEDICATIONS  (STANDING):  aztreonam  IVPB 2000 milliGRAM(s) IV Intermittent every 12 hours  calcium gluconate IVPB 2 Gram(s) IV Intermittent once  chlorhexidine 2% Cloths 1 Application(s) Topical <User Schedule>  chlorhexidine 4% Liquid 1 Application(s) Topical <User Schedule>  cyanocobalamin 1000 MICROGram(s) Oral daily  DOBUTamine Infusion 2.5 MICROgram(s)/kG/Min (7.82 mL/Hr) IV Continuous <Continuous>  furosemide   Injectable 60 milliGRAM(s) IV Push every 8 hours  ipratropium    for Nebulization 500 MICROGram(s) Nebulizer every 6 hours  magnesium sulfate  IVPB 2 Gram(s) IV Intermittent once  pantoprazole    Tablet 40 milliGRAM(s) Oral before breakfast  predniSONE   Tablet 10 milliGRAM(s) Oral daily  Remodulin (Treprostinil) 5mG/mL 1 Vial(s) 1 Vial(s) SubCutaneous Continuous Pump  rivaroxaban 10 milliGRAM(s) Oral daily  spironolactone 25 milliGRAM(s) Oral daily    MEDICATIONS  (PRN):  aluminum hydroxide/magnesium hydroxide/simethicone Suspension 30 milliLiter(s) Oral every 4 hours PRN Dyspepsia  artificial  tears Solution 1 Drop(s) Both EYES three times a day PRN Dry Eyes  melatonin 3 milliGRAM(s) Oral at bedtime PRN Insomnia  ondansetron Injectable 4 milliGRAM(s) IV Push every 8 hours PRN Nausea and/or Vomiting  sodium chloride 0.9% lock flush 10 milliLiter(s) IV Push every 1 hour PRN Pre/post blood products, medications, blood draw, and to maintain line patency      LABS:                        14.2   13.82 )-----------( 320      ( 30 Aug 2022 05:31 )             43.9     Hgb Trend: 14.2<--, 14.0<--, 15.2<--, 14.9<--, 14.9<--  0830    137  |  102  |  35<H>  ----------------------------<  121<H>  4.0   |  19<L>  |  2.09<H>    Ca    8.3<L>      30 Aug 2022 05:31  Phos  4.6       Mg     1.7         TPro  7.4  /  Alb  4.0  /  TBili  1.4<H>  /  DBili  x   /  AST  1170<H>  /  ALT  1573<H>  /  AlkPhos  76      Creatinine Trend: 2.09<--, 2.32<--, 3.05<--, 2.53<--, 1.48<--, 1.21<--  PT/INR - ( 30 Aug 2022 05:31 )   PT: 41.3 sec;   INR: 3.52 ratio         PTT - ( 30 Aug 2022 05:31 )  PTT:33.5 sec  Urinalysis Basic - ( 29 Aug 2022 22:14 )    Color: Yellow / Appearance: Clear / S.012 / pH: x  Gluc: x / Ketone: Negative  / Bili: Negative / Urobili: Negative   Blood: x / Protein: Trace / Nitrite: Negative   Leuk Esterase: Negative / RBC: 14 /hpf / WBC 4 /HPF   Sq Epi: x / Non Sq Epi: 0 /hpf / Bacteria: Negative      Arterial Blood Gas:   @ 05:29  7.41/29/125/18/99.3/-4.9  ABG lactate: --  Arterial Blood Gas:   @ 23:55  7.36/31/77/18/95.8/-6.7  ABG lactate: --  Arterial Blood Gas:   @ 22:00  7.32/28/78/14/95.8/-10.1  ABG lactate: --  Arterial Blood Gas:   @ 19:31  7.37/23/94/13/98.5/-9.7  ABG lactate: --    Venous Blood Gas:   @ 18:30  7.13/47/31/16/35.0  VBG Lactate: 4.9  Venous Blood Gas:   @ 11:23  7.32/42/24/22/24.8  VBG Lactate: 2.7    MICROBIOLOGY:   mMRSA/MSSA PCR (22 @ 19:22)   MRSA PCR Result.: Detected: MRSA/MSSA PCR assay is a qualitative in vitro diagnostic test for the Respiratory Viral Panel with COVID-19 by GLENN (22 @ 12:57)   Rapid RVP Result: NotDetec     RADIOLOGY:  < from: US Abdomen Upper Quadrant Right (22 @ 12:44) >    ACC: [id number] EXAM:  US ABDOMEN RT UPR QUADRANT                          PROCEDURE DATE:  2022        INTERPRETATION:  CLINICAL INFORMATION: Right upper quadrant pain.    COMPARISON: Chest CT dated 6/15/2022.    TECHNIQUE: Sonography of theright upper quadrant.    FINDINGS:  Liver: Normal size. Fatty infiltration. No liver mass.  Bile ducts: Normal caliber. Common bile duct measures 7 mm.  Gallbladder: Within normal limits.  Pancreas: Visualized portions are within normal limits.  Right kidney: 10.5 cm. No hydronephrosis.  Ascites: None.  IVC: Visualized portions are within normal limits.    IMPRESSION:  Mild hepatic steatosis.    No evidence of gallbladder or biliary disease.    < from: Transthoracic Echocardiogram (22 @ 15:46) >    Patient name: ROXI MARIA  YOB: 1980   Age: 42 (F)   MR#: [id number]  Study Date: 2022  Location: [address] [place], [place] [id number]: [facility]  Study quality: Technically fair  Referring Physician: Tiffanie Maier MD  Blood Pressure: 80/41 mmHg  Height: 165 cm  Weight: 104 kg  BSA: 2.1 m2  Heart Rate: 106 mmHg  ------------------------------------------------------------------------  PROCEDURE: Transthoracic echocardiogram with 2-D, M-Mode  and complete spectral and color flow Doppler.  INDICATION: Heart failure, unspecified (I50.9)  ------------------------------------------------------------------------  Dimensions:    Normal Values:  LA:     2.5    2.0 - 4.0 cm  Ao:     3.1    2.0 - 3.8 cm  SEPTUM: 1.5    0.6 - 1.2 cm  PWT:    1.3    0.6 - 1.1 cm  LVIDd:  1.3    3.0 - 5.6 cm  LVIDs:  0.7    1.8 - 4.0 cm  Derived variables:  LVMI: 27 g/m2  RWT: 2.00  Fractional short: 46 %  EF (Visual Estimate): NA %  ------------------------------------------------------------------------  Observations:  Mitral Valve: Normal mitral valve.  No mitral regurgitation  seen.  Aortic Valve/Aorta: Normal trileaflet aortic valve.No  aortic stenosis. No aortic valve regurgitation seen. Peak  left ventricular outflow tract gradient equals 1mm Hg,  mean gradient is equal to 1 mm Hg, LVOT velocity time  integral equals 8 cm.  Aortic Root: 3.1 cm.  Ascending Aorta: 2.7 cm.  LVOT diameter: 2.1 cm.  Left Atrium: LA volume index = 5 cc/m2. The left atrium  appears very small and contracted.  Left Ventricle: Normal left ventricular systolic function.  No segmental wall motion abnormalities. LV cavity appears  small and contracted. Unable to assess LVEF since it is  such a small cavity. Visually, it is probably normal.  Flattening of the interventricular septum in both systole  and diastole is consistent with right ventricular pressure  overload. Mild concentric left ventricular hypertrophy.  Mild diastolic dysfunction (Stage I).  Right Heart: Severe right atrial enlargement. The IAS is  bowed to the left consistent with elevated right sided  pressures. Right ventricular enlargement with decreased  right ventricular systolic function. The RV is severely  dilated.  RV is hypertrophied (measurin.1cm) A device  wire is noted in the right heart. PI end diastolic pressure  was 37 mmHg hence PA end diastolic pressure is at least 57  mmHg.  TAPSE was 1.1 cm. RV S' was 7.2 cm/s. Normal tricuspid  valve. Severe tricuspid regurgitation. Normal pulmonic  valve. Moderate pulmonic regurgitation.  Pericardium/Pleura: Small loculated pericardial effusion  anterior to the right ventricle.  Hemodynamic: Estimated right ventricular systolic pressure  equals 96 mm Hg, assuming right atrial pressure equals > 15  mm Hg, consistent with severe pulmonary hypertension.  ------------------------------------------------------------------------  Conclusions:  1. No mitral regurgitation seen.  2. LA volume index = 5 cc/m2. The left atrium appears very  small and contracted.  3. Mild concentricleft ventricular hypertrophy.Normal left  ventricular systolic function. No segmental wall motion  abnormalities. LV cavity appears small and contracted.  Flattening of the interventricular septum in both systole  and diastole is consistent with right ventricular pressure  overload.  Unable to assess LVEF since it is such a small cavity.  Visually, it is probably normal.  5. Severe right atrial enlargement. The IAS is bowed to the  left consistent with elevated right sided pressures.  6. Right ventricular enlargement with decreased right  ventricular systolic function. The RV is severely dilated.  RV is hypertrophied (measurin.1cm) A device wire is  noted in the right heart. PI end diastolic pressure was 37  mmHg hence PA end diastolic pressure is at least 57 mmHg.  TAPSE was 1.1 cm. RV S' was 7.2 cm/s.  7. Normal tricuspid valve. Severe tricuspid regurgitation.  Estimated pulmonary artery systolic pressure equals 96  mm  Hg, assuming right atrial pressure equals > 15 mm Hg,  consistent with severe pulmonary pressures.  8. Normal pulmonic valve. Moderate pulmonic regurgitation.  9. Small loculated pericardial effusion anterior to the  right ventricle.  *** Compared with echocardiogram of 2020, RV has  increased in size and RVfunction has decreased. There is  now severe tricuspid regurgitation. RVSP has increased and  PA end diastolilc pressure is high. There is a small  loculated pericardial effusion. The LV is very small and          EKG:

## 2022-08-30 NOTE — PROGRESS NOTE ADULT - ASSESSMENT
ASSESSMENT AND PLAN:  42 F with Group I/?IV pulm htn on Remodulin pump, PE on xarelto here with sob and palpitations, found to have acute on chronic cor pulmonale and acute on chronic hypoxic respiratory failure with shock    Neuro  -AAOx3  -No active issues at this time   -c/w Neuro checks as per routine    Cardiovascular  #Shock / cardiogenic shock  #Acute on chronic right heart failure  -likely septic shock vs cardiogenic shock, possible mixed etiology  Lasix 60 mg IV x1 and increased to q 8H- will hold off for n ow - reassess at 2PM today then consider Lasix - goal 1L negative   -c/w dobutamine @ 2.5mcg/kg/min  -not on any pressors - if hypotensive restart norepinephrine       #Cor Pulmonale   -RHF likely 2/2 severe pHTN  -Follows-up outpatient w/ Dr. Yoon for pHTN  -Class 1 & IV pHTN  -sildenafil - c/w being on hold for now  -c/w remodulin subcut., c/w pt pump   -f/u echo- reorder repeat for   -c/w nitric oxide at 10 ppm and monitor for methemoglobinemia q48 w/ co-oximetry       Respiratory / Pulmonary  #Tachypnea PE on xarelto, pHTN, ILD  -CXR on shows enlarged pulmonary arteries, no pleural effusions or consolidations noted :  -likely compensatory response to AGMA i/s/o lactic acidosis and uremia    -respiratory status improved noted to be w/o distress today  -c/w 4L NC / NO3 10PPM for now      GI/Nutrition  #RUQ discomfort  -likely hepatic congestion due to acute right heart failure, r/o cholecystitis  -c/w trending -AST/ALT - transaminitis   -Alk Phosph wnl   -f/u RUQ US- f/u resultes      /Renal  #EDOUARD   -Likely prerenal 2/2 poor perfusion vs intrinsic 2/2 ATN i/s/o shock   -BUN 33, SCr 2.53   -per EMR, BUN 9 and SCr 0.93 in 12/21   -monitor BMP   -strict I+Os  -avoid nephrotoxins  -renally dose medications as appropriate       Skin  -No active issues    ID  #Leukocytosis  -concern for contributing sepsis  -f/u BCx, UA   -aztreonam for empiric coverage     #Endocrine  -f/u TSH     Hematologic/DVT ppx  -xarelto 10 for full AC given hx of PE -   - INR elevated - likely related to congestive hepatopathy, monitor off eliquis for now.    #Ethics  -full code  - is surrogate decision maker.

## 2022-08-30 NOTE — PROGRESS NOTE ADULT - PROBLEM SELECTOR PLAN 1
Elevated BNP, +LE edema,  BNP is 14K with evidence of end organ damage   Transferred to MICU   - C/w  2.5  - K>4, Mg >2  - Monitor I/O, daily weights   - Start lasix 40 IV QD for net negative  - Would assess creatinine trend for spironolactone (repeat this evening)

## 2022-08-31 LAB
ALBUMIN SERPL ELPH-MCNC: 3.9 G/DL — SIGNIFICANT CHANGE UP (ref 3.3–5)
ALBUMIN SERPL ELPH-MCNC: 4.1 G/DL — SIGNIFICANT CHANGE UP (ref 3.3–5)
ALP SERPL-CCNC: 74 U/L — SIGNIFICANT CHANGE UP (ref 40–120)
ALP SERPL-CCNC: 74 U/L — SIGNIFICANT CHANGE UP (ref 40–120)
ALT FLD-CCNC: 1047 U/L — HIGH (ref 10–45)
ALT FLD-CCNC: 861 U/L — HIGH (ref 10–45)
ANION GAP SERPL CALC-SCNC: 10 MMOL/L — SIGNIFICANT CHANGE UP (ref 5–17)
APTT BLD: 29.4 SEC — SIGNIFICANT CHANGE UP (ref 27.5–35.5)
AST SERPL-CCNC: 152 U/L — HIGH (ref 10–40)
AST SERPL-CCNC: 321 U/L — HIGH (ref 10–40)
BASE EXCESS BLDA CALC-SCNC: 2.3 MMOL/L — SIGNIFICANT CHANGE UP (ref -2–3)
BILIRUB SERPL-MCNC: 1.3 MG/DL — HIGH (ref 0.2–1.2)
BILIRUB SERPL-MCNC: 1.3 MG/DL — HIGH (ref 0.2–1.2)
BUN SERPL-MCNC: 17 MG/DL — SIGNIFICANT CHANGE UP (ref 7–23)
BUN SERPL-MCNC: 23 MG/DL — SIGNIFICANT CHANGE UP (ref 7–23)
CALCIUM SERPL-MCNC: 8.6 MG/DL — SIGNIFICANT CHANGE UP (ref 8.4–10.5)
CALCIUM SERPL-MCNC: 8.7 MG/DL — SIGNIFICANT CHANGE UP (ref 8.4–10.5)
CHLORIDE SERPL-SCNC: 97 MMOL/L — SIGNIFICANT CHANGE UP (ref 96–108)
CHLORIDE SERPL-SCNC: 97 MMOL/L — SIGNIFICANT CHANGE UP (ref 96–108)
CO2 BLDA-SCNC: 28 MMOL/L — HIGH (ref 19–24)
CO2 SERPL-SCNC: 23 MMOL/L — SIGNIFICANT CHANGE UP (ref 22–31)
CO2 SERPL-SCNC: 24 MMOL/L — SIGNIFICANT CHANGE UP (ref 22–31)
COHGB MFR BLDA: 1.5 % — SIGNIFICANT CHANGE UP
CREAT SERPL-MCNC: 1.07 MG/DL — SIGNIFICANT CHANGE UP (ref 0.5–1.3)
CREAT SERPL-MCNC: 1.19 MG/DL — SIGNIFICANT CHANGE UP (ref 0.5–1.3)
EGFR: 59 ML/MIN/1.73M2 — LOW
EGFR: 67 ML/MIN/1.73M2 — SIGNIFICANT CHANGE UP
GAS PNL BLDA: SIGNIFICANT CHANGE UP
GLUCOSE SERPL-MCNC: 105 MG/DL — HIGH (ref 70–99)
GLUCOSE SERPL-MCNC: 119 MG/DL — HIGH (ref 70–99)
HCO3 BLDA-SCNC: 26 MMOL/L — SIGNIFICANT CHANGE UP (ref 21–28)
HCT VFR BLD CALC: 45 % — SIGNIFICANT CHANGE UP (ref 34.5–45)
HGB BLD-MCNC: 14.5 G/DL — SIGNIFICANT CHANGE UP (ref 11.5–15.5)
HGB BLDA-MCNC: 15.1 G/DL — SIGNIFICANT CHANGE UP (ref 11.7–16.1)
INR BLD: 1.97 RATIO — HIGH (ref 0.88–1.16)
MAGNESIUM SERPL-MCNC: 1.8 MG/DL — SIGNIFICANT CHANGE UP (ref 1.6–2.6)
MAGNESIUM SERPL-MCNC: 2.2 MG/DL — SIGNIFICANT CHANGE UP (ref 1.6–2.6)
MCHC RBC-ENTMCNC: 22.4 PG — LOW (ref 27–34)
MCHC RBC-ENTMCNC: 32.2 GM/DL — SIGNIFICANT CHANGE UP (ref 32–36)
MCV RBC AUTO: 69.7 FL — LOW (ref 80–100)
METHGB MFR BLDA: 0.7 % — SIGNIFICANT CHANGE UP
METHOD TYPE: SIGNIFICANT CHANGE UP
NRBC # BLD: 0 /100 WBCS — SIGNIFICANT CHANGE UP (ref 0–0)
O2 CT VFR BLDA CALC: 20.7 ML/DL — SIGNIFICANT CHANGE UP (ref 18–22)
OXYHGB MFR BLDA: 96.8 % — HIGH (ref 90–95)
PCO2 BLDA: 39 MMHG — SIGNIFICANT CHANGE UP (ref 32–45)
PH BLDA: 7.44 — SIGNIFICANT CHANGE UP (ref 7.35–7.45)
PHOSPHATE SERPL-MCNC: 3 MG/DL — SIGNIFICANT CHANGE UP (ref 2.5–4.5)
PHOSPHATE SERPL-MCNC: 3.4 MG/DL — SIGNIFICANT CHANGE UP (ref 2.5–4.5)
PLATELET # BLD AUTO: 301 K/UL — SIGNIFICANT CHANGE UP (ref 150–400)
PO2 BLDA: 108 MMHG — SIGNIFICANT CHANGE UP (ref 83–108)
POTASSIUM SERPL-MCNC: 3.5 MMOL/L — SIGNIFICANT CHANGE UP (ref 3.5–5.3)
POTASSIUM SERPL-MCNC: 4.6 MMOL/L — SIGNIFICANT CHANGE UP (ref 3.5–5.3)
POTASSIUM SERPL-SCNC: 3.5 MMOL/L — SIGNIFICANT CHANGE UP (ref 3.5–5.3)
POTASSIUM SERPL-SCNC: 4.6 MMOL/L — SIGNIFICANT CHANGE UP (ref 3.5–5.3)
PROT SERPL-MCNC: 6.9 G/DL — SIGNIFICANT CHANGE UP (ref 6–8.3)
PROT SERPL-MCNC: 7.2 G/DL — SIGNIFICANT CHANGE UP (ref 6–8.3)
PROTHROM AB SERPL-ACNC: 22.8 SEC — HIGH (ref 10.5–13.4)
RBC # BLD: 6.46 M/UL — HIGH (ref 3.8–5.2)
RBC # FLD: 17.7 % — HIGH (ref 10.3–14.5)
S LUGDUNENSIS DNA SNV QL NAA+NON-PROBE: SIGNIFICANT CHANGE UP
SAO2 % BLDA: 99 % — HIGH (ref 94–98)
SODIUM SERPL-SCNC: 130 MMOL/L — LOW (ref 135–145)
SODIUM SERPL-SCNC: 132 MMOL/L — LOW (ref 135–145)
WBC # BLD: 12.57 K/UL — HIGH (ref 3.8–10.5)
WBC # FLD AUTO: 12.57 K/UL — HIGH (ref 3.8–10.5)

## 2022-08-31 PROCEDURE — 99223 1ST HOSP IP/OBS HIGH 75: CPT

## 2022-08-31 PROCEDURE — 37187 VENOUS MECH THROMBECTOMY: CPT | Mod: 59

## 2022-08-31 PROCEDURE — 99291 CRITICAL CARE FIRST HOUR: CPT | Mod: GC

## 2022-08-31 PROCEDURE — 75825 VEIN X-RAY TRUNK: CPT | Mod: 26,59

## 2022-08-31 PROCEDURE — 36013 PLACE CATHETER IN ARTERY: CPT

## 2022-08-31 PROCEDURE — 93308 TTE F-UP OR LMTD: CPT | Mod: 26,59

## 2022-08-31 PROCEDURE — 93321 DOPPLER ECHO F-UP/LMTD STD: CPT | Mod: 26,59

## 2022-08-31 PROCEDURE — 93306 TTE W/DOPPLER COMPLETE: CPT | Mod: 26

## 2022-08-31 PROCEDURE — 76604 US EXAM CHEST: CPT | Mod: 26,GC

## 2022-08-31 PROCEDURE — 71275 CT ANGIOGRAPHY CHEST: CPT | Mod: 26

## 2022-08-31 PROCEDURE — 93308 TTE F-UP OR LMTD: CPT | Mod: 26,GC

## 2022-08-31 RX ORDER — FUROSEMIDE 40 MG
40 TABLET ORAL ONCE
Refills: 0 | Status: COMPLETED | OUTPATIENT
Start: 2022-08-31 | End: 2022-08-31

## 2022-08-31 RX ORDER — HEPARIN SODIUM 5000 [USP'U]/ML
1000 INJECTION INTRAVENOUS; SUBCUTANEOUS
Qty: 25000 | Refills: 0 | Status: DISCONTINUED | OUTPATIENT
Start: 2022-08-31 | End: 2022-09-02

## 2022-08-31 RX ORDER — HYDROMORPHONE HYDROCHLORIDE 2 MG/ML
0.5 INJECTION INTRAMUSCULAR; INTRAVENOUS; SUBCUTANEOUS ONCE
Refills: 0 | Status: DISCONTINUED | OUTPATIENT
Start: 2022-08-31 | End: 2022-08-31

## 2022-08-31 RX ORDER — POTASSIUM CHLORIDE 20 MEQ
20 PACKET (EA) ORAL ONCE
Refills: 0 | Status: COMPLETED | OUTPATIENT
Start: 2022-08-31 | End: 2022-08-31

## 2022-08-31 RX ORDER — HEPARIN SODIUM 5000 [USP'U]/ML
1000 INJECTION INTRAVENOUS; SUBCUTANEOUS
Qty: 25000 | Refills: 0 | Status: DISCONTINUED | OUTPATIENT
Start: 2022-08-31 | End: 2022-08-31

## 2022-08-31 RX ADMIN — HEPARIN SODIUM 10 UNIT(S)/HR: 5000 INJECTION INTRAVENOUS; SUBCUTANEOUS at 14:22

## 2022-08-31 RX ADMIN — SENNA PLUS 2 TABLET(S): 8.6 TABLET ORAL at 20:11

## 2022-08-31 RX ADMIN — HYDROMORPHONE HYDROCHLORIDE 0.5 MILLIGRAM(S): 2 INJECTION INTRAMUSCULAR; INTRAVENOUS; SUBCUTANEOUS at 02:24

## 2022-08-31 RX ADMIN — Medication 500 MICROGRAM(S): at 11:46

## 2022-08-31 RX ADMIN — HYDROMORPHONE HYDROCHLORIDE 0.5 MILLIGRAM(S): 2 INJECTION INTRAMUSCULAR; INTRAVENOUS; SUBCUTANEOUS at 09:05

## 2022-08-31 RX ADMIN — Medication 40 MILLIGRAM(S): at 07:58

## 2022-08-31 RX ADMIN — Medication 10 MILLIGRAM(S): at 05:23

## 2022-08-31 RX ADMIN — HYDROMORPHONE HYDROCHLORIDE 0.5 MILLIGRAM(S): 2 INJECTION INTRAMUSCULAR; INTRAVENOUS; SUBCUTANEOUS at 09:20

## 2022-08-31 RX ADMIN — ONDANSETRON 4 MILLIGRAM(S): 8 TABLET, FILM COATED ORAL at 19:35

## 2022-08-31 RX ADMIN — POLYETHYLENE GLYCOL 3350 17 GRAM(S): 17 POWDER, FOR SOLUTION ORAL at 12:27

## 2022-08-31 RX ADMIN — Medication 20 MILLIEQUIVALENT(S): at 02:25

## 2022-08-31 RX ADMIN — HYDROMORPHONE HYDROCHLORIDE 0.5 MILLIGRAM(S): 2 INJECTION INTRAMUSCULAR; INTRAVENOUS; SUBCUTANEOUS at 20:30

## 2022-08-31 RX ADMIN — MUPIROCIN 1 APPLICATION(S): 20 OINTMENT TOPICAL at 18:53

## 2022-08-31 RX ADMIN — HYDROMORPHONE HYDROCHLORIDE 0.5 MILLIGRAM(S): 2 INJECTION INTRAMUSCULAR; INTRAVENOUS; SUBCUTANEOUS at 02:45

## 2022-08-31 RX ADMIN — CHLORHEXIDINE GLUCONATE 1 APPLICATION(S): 213 SOLUTION TOPICAL at 06:00

## 2022-08-31 RX ADMIN — SPIRONOLACTONE 25 MILLIGRAM(S): 25 TABLET, FILM COATED ORAL at 05:23

## 2022-08-31 RX ADMIN — HYDROMORPHONE HYDROCHLORIDE 0.5 MILLIGRAM(S): 2 INJECTION INTRAMUSCULAR; INTRAVENOUS; SUBCUTANEOUS at 20:09

## 2022-08-31 RX ADMIN — HEPARIN SODIUM 10 UNIT(S)/HR: 5000 INJECTION INTRAVENOUS; SUBCUTANEOUS at 18:51

## 2022-08-31 RX ADMIN — Medication 100 MILLIGRAM(S): at 18:51

## 2022-08-31 RX ADMIN — PREGABALIN 1000 MICROGRAM(S): 225 CAPSULE ORAL at 12:27

## 2022-08-31 RX ADMIN — PANTOPRAZOLE SODIUM 40 MILLIGRAM(S): 20 TABLET, DELAYED RELEASE ORAL at 06:00

## 2022-08-31 RX ADMIN — MUPIROCIN 1 APPLICATION(S): 20 OINTMENT TOPICAL at 05:59

## 2022-08-31 RX ADMIN — Medication 100 MILLIGRAM(S): at 05:23

## 2022-08-31 NOTE — PROGRESS NOTE ADULT - SUBJECTIVE AND OBJECTIVE BOX
Interval History:  Patient clinically improvement     Medications:  aluminum hydroxide/magnesium hydroxide/simethicone Suspension 30 milliLiter(s) Oral every 4 hours PRN  artificial  tears Solution 1 Drop(s) Both EYES three times a day PRN  aztreonam  IVPB 2000 milliGRAM(s) IV Intermittent every 12 hours  chlorhexidine 4% Liquid 1 Application(s) Topical <User Schedule>  cyanocobalamin 1000 MICROGram(s) Oral daily  DOBUTamine Infusion 2.5 MICROgram(s)/kG/Min IV Continuous <Continuous>  ipratropium    for Nebulization 500 MICROGram(s) Nebulizer every 6 hours  melatonin 3 milliGRAM(s) Oral at bedtime PRN  mupirocin 2% Nasal 1 Application(s) Both Nostrils two times a day  ondansetron Injectable 4 milliGRAM(s) IV Push every 8 hours PRN  pantoprazole    Tablet 40 milliGRAM(s) Oral before breakfast  polyethylene glycol 3350 17 Gram(s) Oral daily  predniSONE   Tablet 10 milliGRAM(s) Oral daily  Remodulin (Treprostinil) 5mG/mL 1 Vial(s) 1 Vial(s) SubCutaneous Continuous Pump  senna 2 Tablet(s) Oral at bedtime  sodium chloride 0.9% lock flush 10 milliLiter(s) IV Push every 1 hour PRN  spironolactone 25 milliGRAM(s) Oral daily      Vitals:  T(C): 36.9 (08-31-22 @ 12:00), Max: 36.9 (08-31-22 @ 12:00)  HR: 97 (08-31-22 @ 12:00) (93 - 111)  BP: --  BP(mean): --  RR: 18 (08-31-22 @ 12:00) (12 - 29)  SpO2: 100% (08-31-22 @ 12:00) (90% - 100%)    Daily     Daily         I&O's Summary    30 Aug 2022 07:01  -  31 Aug 2022 07:00  --------------------------------------------------------  IN: 967.2 mL / OUT: 1750 mL / NET: -782.8 mL    31 Aug 2022 07:01  -  31 Aug 2022 13:37  --------------------------------------------------------  IN: 31.2 mL / OUT: 250 mL / NET: -218.8 mL        Physical Exam:  Appearance: No Acute Distress  HEENT: PERRL  Neck: JVD improved   Cardiovascular: RRR  Respiratory: b/l crackles   Gastrointestinal: Soft, Non-tender	  Skin: No cyanosis	  Neurologic: Non-focal  Extremities: edema improved   Psychiatry: A & O x 3, Mood & affect appropriate      Labs:                        14.5   12.57 )-----------( 301      ( 31 Aug 2022 00:14 )             45.0     08-31    130<L>  |  97  |  23  ----------------------------<  105<H>  3.5   |  23  |  1.19    Ca    8.6      31 Aug 2022 00:14  Phos  3.0     08-31  Mg     2.2     08-31    TPro  6.9  /  Alb  3.9  /  TBili  1.3<H>  /  DBili  x   /  AST  321<H>  /  ALT  1047<H>  /  AlkPhos  74  08-31    PT/INR - ( 31 Aug 2022 00:14 )   PT: 22.8 sec;   INR: 1.97 ratio         PTT - ( 31 Aug 2022 00:14 )  PTT:29.4 sec

## 2022-08-31 NOTE — PROGRESS NOTE ADULT - PROBLEM SELECTOR PLAN 3
Started on inhaled nitro and continued on remodulin, Patient's pulmonologist is aware. Reassessment for lung transplant? Plan to decrease nitro then

## 2022-08-31 NOTE — DIETITIAN INITIAL EVALUATION ADULT - PERSON TAUGHT/METHOD
Briefly reviewed need for adequate protein-energy intake, amenable to trying Ensure. Pt made aware RD to remain available./verbal instruction/patient instructed

## 2022-08-31 NOTE — DIETITIAN INITIAL EVALUATION ADULT - NSICDXPASTSURGICALHX_GEN_ALL_CORE_FT
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

## 2022-08-31 NOTE — DIETITIAN INITIAL EVALUATION ADULT - PERTINENT LABORATORY DATA
08-31    130<L>  |  97  |  23  ----------------------------<  105<H>  3.5   |  23  |  1.19    Ca    8.6      31 Aug 2022 00:14  Phos  3.0     08-31  Mg     2.2     08-31    TPro  6.9  /  Alb  3.9  /  TBili  1.3<H>  /  DBili  x   /  AST  321<H>  /  ALT  1047<H>  /  AlkPhos  74  08-31

## 2022-08-31 NOTE — PROGRESS NOTE ADULT - SUBJECTIVE AND OBJECTIVE BOX
CHIEF COMPLAINT:  Patient is a 42y old  Female who presents with a chief complaint of Palpitations (30 Aug 2022 12:45)    HPI:      42 yr old female with PMHx of PulmHTN class I/VI (dx ) on continuous Treprostinil (Remodulin) c/b Rt heart failure s/p PPM, Chronic P.E. on rivaroxaban (dx ), SVT s/p ablation (2020), Asthma, Fe deficiency anemia who originally admitted to medicine floor  22 with c/o of palpitations accompanied by orthopnea/N/V/RUQ abd pain over a 2 day period.  This short hospital course c/b hypotension with SBP 80/41 accompanied by chest pain requiring transfer to MICU evening 22 for hypotension in setting of decompensated RHF/pulmHTN with signs of end organ dysfunction. Pt placed on dobutamine/diuretic/Marcela therapies       Interval Events:      REVIEW OF SYSTEMS:          OBJECTIVE:  ICU Vital Signs Last 24 Hrs  T(C): 36.6 (31 Aug 2022 00:00), Max: 36.6 (30 Aug 2022 20:00)  T(F): 97.9 (31 Aug 2022 00:00), Max: 97.9 (31 Aug 2022 00:00)  HR: 98 (31 Aug 2022 06:00) (93 - 111)  BP: --  BP(mean): --  ABP: 107/64 (31 Aug 2022 06:00) (93/52 - 115/66)  ABP(mean): 79 (31 Aug 2022 06:00) (63 - 85)  RR: 18 (31 Aug 2022 06:00) (12 - 29)  SpO2: 97% (31 Aug 2022 06:00) (91% - 99%)    O2 Parameters below as of 31 Aug 2022 03:10  Patient On (Oxygen Delivery Method): nasal cannula  O2 Flow (L/min):  @ 07:01  -   @ 07:00  --------------------------------------------------------  IN: 898 mL / OUT: 1250 mL / NET: -352 mL     @ 07: @ 06:13  --------------------------------------------------------  IN: 959.4 mL / OUT: 1750 mL / NET: -790.6 mL      CAPILLARY BLOOD GLUCOSE              PHYSICAL EXAM:          HOSPITAL MEDICATIONS:  MEDICATIONS  (STANDING):  aztreonam  IVPB 2000 milliGRAM(s) IV Intermittent every 12 hours  chlorhexidine 4% Liquid 1 Application(s) Topical <User Schedule>  cyanocobalamin 1000 MICROGram(s) Oral daily  DOBUTamine Infusion 2.5 MICROgram(s)/kG/Min (7.82 mL/Hr) IV Continuous <Continuous>  ipratropium    for Nebulization 500 MICROGram(s) Nebulizer every 6 hours  mupirocin 2% Nasal 1 Application(s) Both Nostrils two times a day  pantoprazole    Tablet 40 milliGRAM(s) Oral before breakfast  polyethylene glycol 3350 17 Gram(s) Oral daily  predniSONE   Tablet 10 milliGRAM(s) Oral daily  Remodulin (Treprostinil) 5mG/mL 1 Vial(s) 1 Vial(s) SubCutaneous Continuous Pump  senna 2 Tablet(s) Oral at bedtime  spironolactone 25 milliGRAM(s) Oral daily    MEDICATIONS  (PRN):  aluminum hydroxide/magnesium hydroxide/simethicone Suspension 30 milliLiter(s) Oral every 4 hours PRN Dyspepsia  artificial  tears Solution 1 Drop(s) Both EYES three times a day PRN Dry Eyes  melatonin 3 milliGRAM(s) Oral at bedtime PRN Insomnia  ondansetron Injectable 4 milliGRAM(s) IV Push every 8 hours PRN Nausea and/or Vomiting  sodium chloride 0.9% lock flush 10 milliLiter(s) IV Push every 1 hour PRN Pre/post blood products, medications, blood draw, and to maintain line patency      LABS:                        14.5   12.57 )-----------( 301      ( 31 Aug 2022 00:14 )             45.0     Hgb Trend: 14.5<--, 14.2<--, 14.0<--, 15.2<--, 14.9<--  08-    130<L>  |  97  |  23  ----------------------------<  105<H>  3.5   |  23  |  1.19    Ca    8.6      31 Aug 2022 00:14  Phos  3.0       Mg     2.2         TPro  6.9  /  Alb  3.9  /  TBili  1.3<H>  /  DBili  x   /  AST  321<H>  /  ALT  1047<H>  /  AlkPhos  74      LIVER FUNCTIONS - ( 31 Aug 2022 00:14 )  Alb: 3.9 g/dL / Pro: 6.9 g/dL / ALK PHOS: 74 U/L / ALT: 1047 U/L / AST: 321 U/L / GGT: x           Creatinine Trend: 1.19<--, 1.62<--, 2.09<--, 2.32<--, 3.05<--, 2.53<--  PT/INR - ( 31 Aug 2022 00:14 )   PT: 22.8 sec;   INR: 1.97 ratio         PTT - ( 31 Aug 2022 00:14 )  PTT:29.4 sec  Urinalysis Basic - ( 29 Aug 2022 22:14 )    Color: Yellow / Appearance: Clear / S.012 / pH: x  Gluc: x / Ketone: Negative  / Bili: Negative / Urobili: Negative   Blood: x / Protein: Trace / Nitrite: Negative   Leuk Esterase: Negative / RBC: 14 /hpf / WBC 4 /HPF   Sq Epi: x / Non Sq Epi: 0 /hpf / Bacteria: Negative      Arterial Blood Gas:   @ 23:56  7.45/36/103/25/99.4/1.3  ABG lactate: --  Arterial Blood Gas:   @ 14:00  7.41/38/78/24/96.1/-0.3  ABG lactate: --  Arterial Blood Gas:   @ 05:29  7.41/29/125/18/99.3/-4.9  ABG lactate: --  Arterial Blood Gas:   @ 23:55  7.36/31/77/18/95.8/-6.7  ABG lactate: --  Arterial Blood Gas:   @ 22:00  7.32/28/78/14/95.8/-10.1  ABG lactate: --    Venous Blood Gas:   @ 18:30  7.13/47/31/16/35.0  VBG Lactate: 4.9      MICROBIOLOGY:     RADIOLOGY:  [ ] Reviewed and interpreted by me    EKG:      [facility] (ext [id number]) CHIEF COMPLAINT:  Patient is a 42y old  Female who presents with a chief complaint of Palpitations (30 Aug 2022 12:45)    HPI:      42 yr old female with PMHx of PulmHTN class I/VI (dx ) on continuous Treprostinil (Remodulin) (sildenafil) c/b Rt heart failure s/p PPM (2016, Chronic P.E. on rivaroxaban (dx ), SVT s/p ablation (2020), Asthma (chronic steroid use - prednisone), Fe deficiency anemia who originally admitted to medicine floor  22 with c/o of palpitations accompanied by orthopnea/N/V/RUQ abd pain over a 2 day period.  This short hospital course c/b hypotension with SBP 80/41 accompanied by chest pain requiring transfer to MICU evening 22 for hypotension in setting of decompensated RHF/pulmHTN with signs of end organ dysfunction. Pt placed on dobutamine/diuretic/Marcela therapies       Interval Events:      REVIEW OF SYSTEMS:          OBJECTIVE:  ICU Vital Signs Last 24 Hrs  T(C): 36.6 (31 Aug 2022 00:00), Max: 36.6 (30 Aug 2022 20:00)  T(F): 97.9 (31 Aug 2022 00:00), Max: 97.9 (31 Aug 2022 00:00)  HR: 98 (31 Aug 2022 06:00) (93 - 111)  BP: --  BP(mean): --  ABP: 107/64 (31 Aug 2022 06:00) (93/52 - 115/66)  ABP(mean): 79 (31 Aug 2022 06:00) (63 - 85)  RR: 18 (31 Aug 2022 06:00) (12 - 29)  SpO2: 97% (31 Aug 2022 06:00) (91% - 99%)    O2 Parameters below as of 31 Aug 2022 03:10  Patient On (Oxygen Delivery Method): nasal cannula  O2 Flow (L/min):  @ 07:01  -   @ 07:00  --------------------------------------------------------  IN: 898 mL / OUT: 1250 mL / NET: -352 mL     @ 07: @ 06:13  --------------------------------------------------------  IN: 959.4 mL / OUT: 1750 mL / NET: -790.6 mL      CAPILLARY BLOOD GLUCOSE              PHYSICAL EXAM:          HOSPITAL MEDICATIONS:  MEDICATIONS  (STANDING):  aztreonam  IVPB 2000 milliGRAM(s) IV Intermittent every 12 hours  chlorhexidine 4% Liquid 1 Application(s) Topical <User Schedule>  cyanocobalamin 1000 MICROGram(s) Oral daily  DOBUTamine Infusion 2.5 MICROgram(s)/kG/Min (7.82 mL/Hr) IV Continuous <Continuous>  ipratropium    for Nebulization 500 MICROGram(s) Nebulizer every 6 hours  mupirocin 2% Nasal 1 Application(s) Both Nostrils two times a day  pantoprazole    Tablet 40 milliGRAM(s) Oral before breakfast  polyethylene glycol 3350 17 Gram(s) Oral daily  predniSONE   Tablet 10 milliGRAM(s) Oral daily  Remodulin (Treprostinil) 5mG/mL 1 Vial(s) 1 Vial(s) SubCutaneous Continuous Pump  senna 2 Tablet(s) Oral at bedtime  spironolactone 25 milliGRAM(s) Oral daily    MEDICATIONS  (PRN):  aluminum hydroxide/magnesium hydroxide/simethicone Suspension 30 milliLiter(s) Oral every 4 hours PRN Dyspepsia  artificial  tears Solution 1 Drop(s) Both EYES three times a day PRN Dry Eyes  melatonin 3 milliGRAM(s) Oral at bedtime PRN Insomnia  ondansetron Injectable 4 milliGRAM(s) IV Push every 8 hours PRN Nausea and/or Vomiting  sodium chloride 0.9% lock flush 10 milliLiter(s) IV Push every 1 hour PRN Pre/post blood products, medications, blood draw, and to maintain line patency      LABS:                        14.5   12.57 )-----------( 301      ( 31 Aug 2022 00:14 )             45.0     Hgb Trend: 14.5<--, 14.2<--, 14.0<--, 15.2<--, 14.9<--  31    130<L>  |  97  |  23  ----------------------------<  105<H>  3.5   |  23  |  1.19    Ca    8.6      31 Aug 2022 00:14  Phos  3.0       Mg     2.2         TPro  6.9  /  Alb  3.9  /  TBili  1.3<H>  /  DBili  x   /  AST  321<H>  /  ALT  1047<H>  /  AlkPhos  74      LIVER FUNCTIONS - ( 31 Aug 2022 00:14 )  Alb: 3.9 g/dL / Pro: 6.9 g/dL / ALK PHOS: 74 U/L / ALT: 1047 U/L / AST: 321 U/L / GGT: x           Creatinine Trend: 1.19<--, 1.62<--, 2.09<--, 2.32<--, 3.05<--, 2.53<--  PT/INR - ( 31 Aug 2022 00:14 )   PT: 22.8 sec;   INR: 1.97 ratio         PTT - ( 31 Aug 2022 00:14 )  PTT:29.4 sec  Urinalysis Basic - ( 29 Aug 2022 22:14 )    Color: Yellow / Appearance: Clear / S.012 / pH: x  Gluc: x / Ketone: Negative  / Bili: Negative / Urobili: Negative   Blood: x / Protein: Trace / Nitrite: Negative   Leuk Esterase: Negative / RBC: 14 /hpf / WBC 4 /HPF   Sq Epi: x / Non Sq Epi: 0 /hpf / Bacteria: Negative      Arterial Blood Gas:   @ 23:56  7.45/36/103/25/99.4/1.3  ABG lactate: --  Arterial Blood Gas:   @ 14:00  7.41/38/78/24/96.1/-0.3  ABG lactate: --  Arterial Blood Gas:   @ 05:29  7.41/29/125/18/99.3/-4.9  ABG lactate: --  Arterial Blood Gas:   @ 23:55  7.36/31/77/18/95.8/-6.7  ABG lactate: --  Arterial Blood Gas:   @ 22:00  7.32/28/78/14/95.8/-10.1  ABG lactate: --    Venous Blood Gas:   @ 18:30  7.13/47/31/16/35.0  VBG Lactate: 4.9      MICROBIOLOGY:     RADIOLOGY:  [ ] Reviewed and interpreted by me    EKG:      [facility] (ext [id number]) CHIEF COMPLAINT:  Patient is a 42y old  Female who presents with a chief complaint of Palpitations (30 Aug 2022 12:45)    HPI:      42 yr old female with PMHx of PulmHTN class I/VI (dx ) on continuous Treprostinil (Remodulin) (sildenafil) c/b Rt heart failure s/p PPM (Dual chamber ), Chronic P.E. on rivaroxaban (dx ), SVT s/p ablation (2020), Asthma (chronic steroid use - prednisone), Fe deficiency anemia who originally admitted to medicine floor  22 with c/o of palpitations accompanied by orthopnea/N/V/RUQ abd pain over a 2 day period.  This short hospital course c/b hypotension with SBP 80/41 accompanied by chest pain, transaminitis, EDOUARD with peak sCr 3.05(now resolved baseline sCr of .90-1.24) requiring transfer to MICU evening 22 for hypotension in setting of decompensated RHF/pulmHTN with signs of end organ dysfunction. Pt placed on dobutamine/diuretic/Marcela therapies       Interval Events:      REVIEW OF SYSTEMS:          OBJECTIVE:  ICU Vital Signs Last 24 Hrs  T(C): 36.6 (31 Aug 2022 00:00), Max: 36.6 (30 Aug 2022 20:00)  T(F): 97.9 (31 Aug 2022 00:00), Max: 97.9 (31 Aug 2022 00:00)  HR: 98 (31 Aug 2022 06:00) (93 - 111)  BP: --  BP(mean): --  ABP: 107/64 (31 Aug 2022 06:00) (93/52 - 115/66)  ABP(mean): 79 (31 Aug 2022 06:00) (63 - 85)  RR: 18 (31 Aug 2022 06:00) (12 - 29)  SpO2: 97% (31 Aug 2022 06:00) (91% - 99%)    O2 Parameters below as of 31 Aug 2022 03:10  Patient On (Oxygen Delivery Method): nasal cannula  O2 Flow (L/min): 4            - @ 07:01  -  08-30 @ 07:00  --------------------------------------------------------  IN: 898 mL / OUT: 1250 mL / NET: -352 mL     @ 07: @ 06:13  --------------------------------------------------------  IN: 959.4 mL / OUT: 1750 mL / NET: -790.6 mL      CAPILLARY BLOOD GLUCOSE              PHYSICAL EXAM:          HOSPITAL MEDICATIONS:  MEDICATIONS  (STANDING):  aztreonam  IVPB 2000 milliGRAM(s) IV Intermittent every 12 hours  chlorhexidine 4% Liquid 1 Application(s) Topical <User Schedule>  cyanocobalamin 1000 MICROGram(s) Oral daily  DOBUTamine Infusion 2.5 MICROgram(s)/kG/Min (7.82 mL/Hr) IV Continuous <Continuous>  ipratropium    for Nebulization 500 MICROGram(s) Nebulizer every 6 hours  mupirocin 2% Nasal 1 Application(s) Both Nostrils two times a day  pantoprazole    Tablet 40 milliGRAM(s) Oral before breakfast  polyethylene glycol 3350 17 Gram(s) Oral daily  predniSONE   Tablet 10 milliGRAM(s) Oral daily  Remodulin (Treprostinil) 5mG/mL 1 Vial(s) 1 Vial(s) SubCutaneous Continuous Pump  senna 2 Tablet(s) Oral at bedtime  spironolactone 25 milliGRAM(s) Oral daily    MEDICATIONS  (PRN):  aluminum hydroxide/magnesium hydroxide/simethicone Suspension 30 milliLiter(s) Oral every 4 hours PRN Dyspepsia  artificial  tears Solution 1 Drop(s) Both EYES three times a day PRN Dry Eyes  melatonin 3 milliGRAM(s) Oral at bedtime PRN Insomnia  ondansetron Injectable 4 milliGRAM(s) IV Push every 8 hours PRN Nausea and/or Vomiting  sodium chloride 0.9% lock flush 10 milliLiter(s) IV Push every 1 hour PRN Pre/post blood products, medications, blood draw, and to maintain line patency      LABS:                        14.5   12.57 )-----------( 301      ( 31 Aug 2022 00:14 )             45.0     Hgb Trend: 14.5<--, 14.2<--, 14.0<--, 15.2<--, 14.9<--      130<L>  |  97  |  23  ----------------------------<  105<H>  3.5   |  23  |  1.19    Ca    8.6      31 Aug 2022 00:14  Phos  3.0       Mg     2.2         TPro  6.9  /  Alb  3.9  /  TBili  1.3<H>  /  DBili  x   /  AST  321<H>  /  ALT  1047<H>  /  AlkPhos  74      LIVER FUNCTIONS - ( 31 Aug 2022 00:14 )  Alb: 3.9 g/dL / Pro: 6.9 g/dL / ALK PHOS: 74 U/L / ALT: 1047 U/L / AST: 321 U/L / GGT: x           Creatinine Trend: 1.19<--, 1.62<--, 2.09<--, 2.32<--, 3.05<--, 2.53<--  PT/INR - ( 31 Aug 2022 00:14 )   PT: 22.8 sec;   INR: 1.97 ratio         PTT - ( 31 Aug 2022 00:14 )  PTT:29.4 sec  Urinalysis Basic - ( 29 Aug 2022 22:14 )    Color: Yellow / Appearance: Clear / S.012 / pH: x  Gluc: x / Ketone: Negative  / Bili: Negative / Urobili: Negative   Blood: x / Protein: Trace / Nitrite: Negative   Leuk Esterase: Negative / RBC: 14 /hpf / WBC 4 /HPF   Sq Epi: x / Non Sq Epi: 0 /hpf / Bacteria: Negative      Arterial Blood Gas:   @ 23:56  7.45/36/103/25/99.4/1.3  ABG lactate: --  Arterial Blood Gas:   @ 14:00  7.41/38/78/24/96.1/-0.3  ABG lactate: --  Arterial Blood Gas:   @ 05:29  7.41/29/125/18/99.3/-4.9  ABG lactate: --  Arterial Blood Gas:   @ 23:55  7.36/31/77/18/95.8/-6.7  ABG lactate: --  Arterial Blood Gas:   @ 22:00  7.32/28/78/14/95.8/-10.1  ABG lactate: --    Venous Blood Gas:   @ 18:30  7.13/47/31/16/35.0  VBG Lactate: 4.9      MICROBIOLOGY:     RADIOLOGY:  [ ] Reviewed and interpreted by me    EKG:      [facility] (ext [id number]) CHIEF COMPLAINT:  Patient is a 42y old  Female who presents with a chief complaint of Palpitations (30 Aug 2022 12:45)    HPI:      42 yr old female with PMHx of PulmHTN class I/VI (dx ) on continuous Treprostinil (Remodulin) (sildenafil) c/b Rt heart failure s/p PPM (Dual chamber ), Chronic P.E. on rivaroxaban (dx ), SVT s/p ablation (2020), Asthma (chronic steroid use - prednisone), Fe deficiency anemia who originally admitted to medicine floor  22 with c/o of palpitations accompanied by orthopnea/N/V/RUQ abd pain over a 2 day period.  This short hospital course c/b hypotension with SBP 80/41 accompanied by chest pain, transaminitis, EDOUARD with peak sCr 3.05(now resolved baseline sCr of .90-1.24) requiring transfer to MICU evening 22 for hypotension in setting of decompensated RHF/pulmHTN with signs of end organ dysfunction. Pt placed on dobutamine/diuretic/Marcela therapies       Interval Events:  received lasix 40 mg IVP this a.m.    REVIEW OF SYSTEMS:    CONSTITUTIONAL:           No weakness, fevers or chills, c/o H/A  EYES/ENT:           No visual changes;  No vertigo or throat pain   NECK:            No pain or stiffness  RESPIRATORY:            occasional productive cough, wheezing, without hemoptysis; without shortness of breath  CARDIOVASCULAR:            No chest pain or palpitations  GASTROINTESTINAL:           No abdominal or epigastric pain. No nausea, vomiting, or hematemesis; No diarrhea or constipation. No melena or hematochezia.  GENITOURINARY:            No dysuria, frequency or hematuria  NEUROLOGICAL:            No numbness or weakness  SKIN:            No pruritis , rashes          OBJECTIVE:  ICU Vital Signs Last 24 Hrs  T(C): 36.6 (31 Aug 2022 00:00), Max: 36.6 (30 Aug 2022 20:00)  T(F): 97.9 (31 Aug 2022 00:00), Max: 97.9 (31 Aug 2022 00:00)  HR: 98 (31 Aug 2022 06:00) (93 - 111)  BP: --  BP(mean): --  ABP: 107/64 (31 Aug 2022 06:00) (93/52 - 115/66)  ABP(mean): 79 (31 Aug 2022 06:00) (63 - 85)  RR: 18 (31 Aug 2022 06:00) (12 - 29)  SpO2: 97% (31 Aug 2022 06:00) (91% - 99%)    O2 Parameters below as of 31 Aug 2022 03:10  Patient On (Oxygen Delivery Method): nasal cannula  O2 Flow (L/min): 4             @ 07:  -   @ 07:00  --------------------------------------------------------  IN: 898 mL / OUT: 1250 mL / NET: -352 mL     @ 07:  -   @ 06:13  --------------------------------------------------------  IN: 959.4 mL / OUT: 1750 mL / NET: -790.6 mL      CAPILLARY BLOOD GLUCOSE    PHYSICAL EXAM:  GENERAL:   NAD, well-groomed, obese    HEAD:    Atraumatic, Normocephalic    EYES:   EOMI, PERRLA 3 mm, conjunctiva and sclera clear    ENMT:   No oropharyngeal exudates, erythema or lesions,  Moist mucous membranes    NECK:   Supple, no cervical lymphadenopathy, no JVD    NERVOUS SYSTEM:  Alert & Oriented X3, CN II-XII intact, has spontaneous purposeful movement of all extremities; Upper extremities  4/5; Lower extremities 4/5, full sensation to light touch     CHEST/LUNG:   Utilizing 2 liters N/C with 10 PPM of Marcela   .Breath sounds bilaterally without  crackles, rhonchi, end inspiratory/expiratory wheezing in RLL field, without or rubs. POCUS A- line predominant with focal B lines in lower lung fields, curtain sign appreciated    HEART:   cardiac monitor sinus tach without ectopy   ; S1/S2 I/VI sys murmur, without rubs, or gallops, POCUS Good LVFx, hyperdynamic, poor RVFx, RV=LV, septal flattening and slight "D"ing appreciated, VTI 15, IVC 1.2    ABDOMEN:   Soft, Nontender, Nondistended; Bowel sounds present, Bladder non distended, non palpable    EXTREMITIES:   Pulses palpable radial pulses 2+ bilat, DP/PT 1+/1+ bilat, without clubbing, cyanosis. Digits warm to touch with good cap refill < 3 secs    SKIN:   warm, dry, intact, normal color, no rash or abnormal lesions, without palpable nodes        HOSPITAL MEDICATIONS:  MEDICATIONS  (STANDING):  aztreonam  IVPB 2000 milliGRAM(s) IV Intermittent every 12 hours  chlorhexidine 4% Liquid 1 Application(s) Topical <User Schedule>  cyanocobalamin 1000 MICROGram(s) Oral daily  DOBUTamine Infusion 2.5 MICROgram(s)/kG/Min (7.82 mL/Hr) IV Continuous <Continuous>  ipratropium    for Nebulization 500 MICROGram(s) Nebulizer every 6 hours  mupirocin 2% Nasal 1 Application(s) Both Nostrils two times a day  pantoprazole    Tablet 40 milliGRAM(s) Oral before breakfast  polyethylene glycol 3350 17 Gram(s) Oral daily  predniSONE   Tablet 10 milliGRAM(s) Oral daily  Remodulin (Treprostinil) 5mG/mL 1 Vial(s) 1 Vial(s) SubCutaneous Continuous Pump  senna 2 Tablet(s) Oral at bedtime  spironolactone 25 milliGRAM(s) Oral daily    MEDICATIONS  (PRN):  aluminum hydroxide/magnesium hydroxide/simethicone Suspension 30 milliLiter(s) Oral every 4 hours PRN Dyspepsia  artificial  tears Solution 1 Drop(s) Both EYES three times a day PRN Dry Eyes  melatonin 3 milliGRAM(s) Oral at bedtime PRN Insomnia  ondansetron Injectable 4 milliGRAM(s) IV Push every 8 hours PRN Nausea and/or Vomiting  sodium chloride 0.9% lock flush 10 milliLiter(s) IV Push every 1 hour PRN Pre/post blood products, medications, blood draw, and to maintain line patency      LABS:                        14.5   12.57 )-----------( 301      ( 31 Aug 2022 00:14 )             45.0     Hgb Trend: 14.5<--, 14.2<--, 14.0<--, 15.2<--, 14.9<--  08    130<L>  |  97  |  23  ----------------------------<  105<H>  3.5   |  23  |  1.19    Ca    8.6      31 Aug 2022 00:14  Phos  3.0       Mg     2.2         TPro  6.9  /  Alb  3.9  /  TBili  1.3<H>  /  DBili  x   /  AST  321<H>  /  ALT  1047<H>  /  AlkPhos  74      LIVER FUNCTIONS - ( 31 Aug 2022 00:14 )  Alb: 3.9 g/dL / Pro: 6.9 g/dL / ALK PHOS: 74 U/L / ALT: 1047 U/L / AST: 321 U/L / GGT: x           Creatinine Trend: 1.19<--, 1.62<--, 2.09<--, 2.32<--, 3.05<--, 2.53<--  PT/INR - ( 31 Aug 2022 00:14 )   PT: 22.8 sec;   INR: 1.97 ratio         PTT - ( 31 Aug 2022 00:14 )  PTT:29.4 sec  Urinalysis Basic - ( 29 Aug 2022 22:14 )    Color: Yellow / Appearance: Clear / S.012 / pH: x  Gluc: x / Ketone: Negative  / Bili: Negative / Urobili: Negative   Blood: x / Protein: Trace / Nitrite: Negative   Leuk Esterase: Negative / RBC: 14 /hpf / WBC 4 /HPF   Sq Epi: x / Non Sq Epi: 0 /hpf / Bacteria: Negative      Arterial Blood Gas:   @ 23:56  7.45/36/103/25/99.4/1.3  ABG lactate: --  Arterial Blood Gas:   @ 14:00  7.41/38/78/24/96.1/-0.3  ABG lactate: --  Arterial Blood Gas:   @ 05:29  7.41/29/125/18/99.3/-4.9  ABG lactate: --  Arterial Blood Gas:   @ 23:55  7.36/31/77/18/95.8/-6.7  ABG lactate: --  Arterial Blood Gas:   @ 22:00  7.32/28/78/14/95.8/-10.1  ABG lactate: --    Venous Blood Gas:   @ 18:30  7.13/47/31/16/35.0  VBG Lactate: 4.9      MICROBIOLOGY:     RADIOLOGY:  [ ] Reviewed and interpreted by me    EKG:      [facility] (ext [phone]) CHIEF COMPLAINT:  Patient is a 42y old  Female who presents with a chief complaint of Palpitations (30 Aug 2022 12:45)    HPI:      42 yr old female with PMHx of PulmHTN class I/VI (dx ) on continuous Treprostinil (Remodulin) (sildenafil) c/b Rt heart failure s/p PPM (Dual chamber ), Chronic P.E. on rivaroxaban (dx ), SVT s/p ablation (2020), Asthma (chronic steroid use - prednisone), Fe deficiency anemia who originally admitted to medicine floor  22 with c/o of palpitations accompanied by orthopnea/N/V/RUQ abd pain over a 2 day period.  This short hospital course c/b hypotension with SBP 80/41 accompanied by chest pain, transaminitis, EDOUARD with peak sCr 3.05(now resolved baseline sCr of .90-1.24) requiring transfer to MICU evening 22 for hypotension in setting of decompensated RHF/pulmHTN with signs of end organ dysfunction. Pt placed on dobutamine/diuretic/Marcela therapies       Interval Events:  received lasix 40 mg IVP this a.m.    REVIEW OF SYSTEMS:    CONSTITUTIONAL:           No weakness, fevers or chills, c/o H/A  EYES/ENT:           No visual changes;  No vertigo or throat pain   NECK:            No pain or stiffness  RESPIRATORY:            occasional productive cough, wheezing, without hemoptysis; without shortness of breath  CARDIOVASCULAR:            No chest pain or palpitations  GASTROINTESTINAL:           No abdominal or epigastric pain. No nausea, vomiting, or hematemesis; No diarrhea or constipation. No melena or hematochezia.  GENITOURINARY:            No dysuria, frequency or hematuria  NEUROLOGICAL:            No numbness or weakness  SKIN:            No pruritis , rashes          OBJECTIVE:  ICU Vital Signs Last 24 Hrs  T(C): 36.6 (31 Aug 2022 00:00), Max: 36.6 (30 Aug 2022 20:00)  T(F): 97.9 (31 Aug 2022 00:00), Max: 97.9 (31 Aug 2022 00:00)  HR: 98 (31 Aug 2022 06:00) (93 - 111)  BP: --  BP(mean): --  ABP: 107/64 (31 Aug 2022 06:00) (93/52 - 115/66)  ABP(mean): 79 (31 Aug 2022 06:00) (63 - 85)  RR: 18 (31 Aug 2022 06:00) (12 - 29)  SpO2: 97% (31 Aug 2022 06:00) (91% - 99%)    O2 Parameters below as of 31 Aug 2022 03:10  Patient On (Oxygen Delivery Method): nasal cannula  O2 Flow (L/min): 4             @ 07:  -   @ 07:00  --------------------------------------------------------  IN: 898 mL / OUT: 1250 mL / NET: -352 mL     @ 07:  -   @ 06:13  --------------------------------------------------------  IN: 959.4 mL / OUT: 1750 mL / NET: -790.6 mL      CAPILLARY BLOOD GLUCOSE    PHYSICAL EXAM:  GENERAL:   NAD, well-groomed, obese    HEAD:    Atraumatic, Normocephalic    EYES:   EOMI, PERRLA 3 mm, conjunctiva and sclera clear    ENMT:   No oropharyngeal exudates, erythema or lesions,  Moist mucous membranes    NECK:   Supple, no cervical lymphadenopathy, no JVD    NERVOUS SYSTEM:  Alert & Oriented X3, CN II-XII intact, has spontaneous purposeful movement of all extremities; Upper extremities  4/5; Lower extremities 4/5, full sensation to light touch     CHEST/LUNG:   Utilizing 2 liters N/C with 10 PPM of Marcela   .Breath sounds bilaterally without  crackles, rhonchi, end inspiratory/expiratory wheezing in RLL field, without or rubs. POCUS A- line predominant with focal B lines in lower lung fields, curtain sign appreciated    HEART:   cardiac monitor sinus tach without ectopy   ; S1/S2 I/VI sys murmur, without rubs, or gallops, POCUS Good LVFx, hyperdynamic, poor RVFx, RV=LV, septal flattening and slight "D"ing appreciated, VTI 15, IVC 1.2    ABDOMEN:   Soft, Nontender, Nondistended; Bowel sounds present, Bladder non distended, non palpable    EXTREMITIES:   Pulses palpable radial pulses 2+ bilat, DP/PT 1+/1+ bilat, without clubbing, cyanosis. Digits warm to touch with good cap refill < 3 secs. Rt Radial A-line site without redness, infiltration, swelling, tenderness, pain, dressing dry and intact. Lt fem TLC site without redness, infiltration, swelling, tenderness, pain, dressing dry and intact     SKIN:   warm, dry, intact, normal color, no rash or abnormal lesions, without palpable nodes        HOSPITAL MEDICATIONS:  MEDICATIONS  (STANDING):  aztreonam  IVPB 2000 milliGRAM(s) IV Intermittent every 12 hours  chlorhexidine 4% Liquid 1 Application(s) Topical <User Schedule>  cyanocobalamin 1000 MICROGram(s) Oral daily  DOBUTamine Infusion 2.5 MICROgram(s)/kG/Min (7.82 mL/Hr) IV Continuous <Continuous>  ipratropium    for Nebulization 500 MICROGram(s) Nebulizer every 6 hours  mupirocin 2% Nasal 1 Application(s) Both Nostrils two times a day  pantoprazole    Tablet 40 milliGRAM(s) Oral before breakfast  polyethylene glycol 3350 17 Gram(s) Oral daily  predniSONE   Tablet 10 milliGRAM(s) Oral daily  Remodulin (Treprostinil) 5mG/mL 1 Vial(s) 1 Vial(s) SubCutaneous Continuous Pump  senna 2 Tablet(s) Oral at bedtime  spironolactone 25 milliGRAM(s) Oral daily    MEDICATIONS  (PRN):  aluminum hydroxide/magnesium hydroxide/simethicone Suspension 30 milliLiter(s) Oral every 4 hours PRN Dyspepsia  artificial  tears Solution 1 Drop(s) Both EYES three times a day PRN Dry Eyes  melatonin 3 milliGRAM(s) Oral at bedtime PRN Insomnia  ondansetron Injectable 4 milliGRAM(s) IV Push every 8 hours PRN Nausea and/or Vomiting  sodium chloride 0.9% lock flush 10 milliLiter(s) IV Push every 1 hour PRN Pre/post blood products, medications, blood draw, and to maintain line patency      LABS:                        14.5   12.57 )-----------( 301      ( 31 Aug 2022 00:14 )             45.0     Hgb Trend: 14.5<--, 14.2<--, 14.0<--, 15.2<--, 14.9<--  31    130<L>  |  97  |  23  ----------------------------<  105<H>  3.5   |  23  |  1.19    Ca    8.6      31 Aug 2022 00:14  Phos  3.0       Mg     2.2         TPro  6.9  /  Alb  3.9  /  TBili  1.3<H>  /  DBili  x   /  AST  321<H>  /  ALT  1047<H>  /  AlkPhos  74      LIVER FUNCTIONS - ( 31 Aug 2022 00:14 )  Alb: 3.9 g/dL / Pro: 6.9 g/dL / ALK PHOS: 74 U/L / ALT: 1047 U/L / AST: 321 U/L / GGT: x           Creatinine Trend: 1.19<--, 1.62<--, 2.09<--, 2.32<--, 3.05<--, 2.53<--  PT/INR - ( 31 Aug 2022 00:14 )   PT: 22.8 sec;   INR: 1.97 ratio         PTT - ( 31 Aug 2022 00:14 )  PTT:29.4 sec  Urinalysis Basic - ( 29 Aug 2022 22:14 )    Color: Yellow / Appearance: Clear / S.012 / pH: x  Gluc: x / Ketone: Negative  / Bili: Negative / Urobili: Negative   Blood: x / Protein: Trace / Nitrite: Negative   Leuk Esterase: Negative / RBC: 14 /hpf / WBC 4 /HPF   Sq Epi: x / Non Sq Epi: 0 /hpf / Bacteria: Negative      Arterial Blood Gas:   @ 23:56  7.45/36/103/25/99.4/1.3  ABG lactate: --  Arterial Blood Gas:   @ 14:00  7.41/38/78/24/96.1/-0.3  ABG lactate: --  Arterial Blood Gas:   @ 05:29  7.41/29/125/18/99.3/-4.9  ABG lactate: --  Arterial Blood Gas:   @ 23:55  7.36/31/77/18/95.8/-6.7  ABG lactate: --  Arterial Blood Gas:   @ 22:00  7.32/28/78/14/95.8/-10.1  ABG lactate: --    Venous Blood Gas:   @ 18:30  7.13/47/31/16/35.0  VBG Lactate: 4.9      MICROBIOLOGY:     RADIOLOGY:  [ ] Reviewed and interpreted by me    EKG:      [facility]rian [facility] (ext [id number]) CHIEF COMPLAINT:  Patient is a 42y old  Female who presents with a chief complaint of Palpitations (30 Aug 2022 12:45)    HPI:      42 yr old female with PMHx of PulmHTN class I/VI (dx ) on continuous Treprostinil (Remodulin) (sildenafil) c/b Rt heart failure s/p PPM (Dual chamber ), Chronic P.E. on rivaroxaban (dx ), SVT s/p ablation (2020), Asthma (chronic steroid use - prednisone), Fe deficiency anemia who originally admitted to medicine floor  22 with c/o of palpitations accompanied by orthopnea/N/V/RUQ abd pain over a 2 day period.  This short hospital course c/b hypotension with SBP 80/41 accompanied by chest pain, transaminitis, EDOUARD with peak sCr 3.05(now resolved baseline sCr of .90-1.24) requiring transfer to MICU evening 22 for hypotension in setting of decompensated RHF/pulmHTN with signs of end organ dysfunction. Pt placed on dobutamine/diuretic/Marcela therapies       Interval Events:  received lasix 40 mg IVP this a.m.    REVIEW OF SYSTEMS:    CONSTITUTIONAL:           No weakness, fevers or chills, c/o H/A  EYES/ENT:           No visual changes;  No vertigo or throat pain   NECK:            No pain or stiffness  RESPIRATORY:            occasional productive cough, wheezing, without hemoptysis; without shortness of breath  CARDIOVASCULAR:            No chest pain or palpitations  GASTROINTESTINAL:           No abdominal or epigastric pain. No nausea, vomiting, or hematemesis; No diarrhea or constipation. No melena or hematochezia.  GENITOURINARY:            No dysuria, frequency or hematuria  NEUROLOGICAL:            No numbness or weakness  SKIN:            No pruritis , rashes          OBJECTIVE:  ICU Vital Signs Last 24 Hrs  T(C): 36.6 (31 Aug 2022 00:00), Max: 36.6 (30 Aug 2022 20:00)  T(F): 97.9 (31 Aug 2022 00:00), Max: 97.9 (31 Aug 2022 00:00)  HR: 98 (31 Aug 2022 06:00) (93 - 111)  BP: --  BP(mean): --  ABP: 107/64 (31 Aug 2022 06:00) (93/52 - 115/66)  ABP(mean): 79 (31 Aug 2022 06:00) (63 - 85)  RR: 18 (31 Aug 2022 06:00) (12 - 29)  SpO2: 97% (31 Aug 2022 06:00) (91% - 99%)    O2 Parameters below as of 31 Aug 2022 03:10  Patient On (Oxygen Delivery Method): nasal cannula  O2 Flow (L/min): 4             @ 07:  -   @ 07:00  --------------------------------------------------------  IN: 898 mL / OUT: 1250 mL / NET: -352 mL     @ 07:  -   @ 06:13  --------------------------------------------------------  IN: 959.4 mL / OUT: 1750 mL / NET: -790.6 mL      CAPILLARY BLOOD GLUCOSE    PHYSICAL EXAM:  GENERAL:   NAD, well-groomed, obese    HEAD:    Atraumatic, Normocephalic    EYES:   EOMI, PERRLA 3 mm, conjunctiva and sclera clear    ENMT:   No oropharyngeal exudates, erythema or lesions,  Moist mucous membranes    NECK:   Supple, no cervical lymphadenopathy, no JVD    NERVOUS SYSTEM:  Alert & Oriented X3, CN II-XII intact, has spontaneous purposeful movement of all extremities; Upper extremities  4/5; Lower extremities 4/5, full sensation to light touch     CHEST/LUNG:   Utilizing 2 liters N/C with 10 PPM of Marcela   .Breath sounds bilaterally without  crackles, rhonchi, end inspiratory/expiratory wheezing in RLL field, without or rubs. POCUS A- line predominant with focal B lines in lower lung fields, curtain sign appreciated    HEART:   cardiac monitor sinus tach without ectopy   ; S1/S2 I/VI sys murmur, without rubs, or gallops, POCUS Good LVFx, hyperdynamic, poor RVFx, RV=LV, septal flattening and slight "D"ing appreciated, VTI 15, IVC 1.1-1.2    ABDOMEN:   Soft, Nontender, Nondistended; Bowel sounds present, Bladder non distended, non palpable    EXTREMITIES:   Pulses palpable radial pulses 2+ bilat, DP/PT 1+/1+ bilat, without clubbing, cyanosis. Digits warm to touch with good cap refill < 3 secs. Rt Radial A-line site without redness, infiltration, swelling, tenderness, pain, dressing dry and intact. Lt fem TLC site without redness, infiltration, swelling, tenderness, pain, dressing dry and intact     SKIN:   warm, dry, intact, normal color, no rash or abnormal lesions, without palpable nodes        HOSPITAL MEDICATIONS:  MEDICATIONS  (STANDING):  aztreonam  IVPB 2000 milliGRAM(s) IV Intermittent every 12 hours  chlorhexidine 4% Liquid 1 Application(s) Topical <User Schedule>  cyanocobalamin 1000 MICROGram(s) Oral daily  DOBUTamine Infusion 2.5 MICROgram(s)/kG/Min (7.82 mL/Hr) IV Continuous <Continuous>  ipratropium    for Nebulization 500 MICROGram(s) Nebulizer every 6 hours  mupirocin 2% Nasal 1 Application(s) Both Nostrils two times a day  pantoprazole    Tablet 40 milliGRAM(s) Oral before breakfast  polyethylene glycol 3350 17 Gram(s) Oral daily  predniSONE   Tablet 10 milliGRAM(s) Oral daily  Remodulin (Treprostinil) 5mG/mL 1 Vial(s) 1 Vial(s) SubCutaneous Continuous Pump  senna 2 Tablet(s) Oral at bedtime  spironolactone 25 milliGRAM(s) Oral daily    MEDICATIONS  (PRN):  aluminum hydroxide/magnesium hydroxide/simethicone Suspension 30 milliLiter(s) Oral every 4 hours PRN Dyspepsia  artificial  tears Solution 1 Drop(s) Both EYES three times a day PRN Dry Eyes  melatonin 3 milliGRAM(s) Oral at bedtime PRN Insomnia  ondansetron Injectable 4 milliGRAM(s) IV Push every 8 hours PRN Nausea and/or Vomiting  sodium chloride 0.9% lock flush 10 milliLiter(s) IV Push every 1 hour PRN Pre/post blood products, medications, blood draw, and to maintain line patency      LABS:                        14.5   12.57 )-----------( 301      ( 31 Aug 2022 00:14 )             45.0     Hgb Trend: 14.5<--, 14.2<--, 14.0<--, 15.2<--, 14.9<--      130<L>  |  97  |  23  ----------------------------<  105<H>  3.5   |  23  |  1.19    Ca    8.6      31 Aug 2022 00:14  Phos  3.0       Mg     2.2         TPro  6.9  /  Alb  3.9  /  TBili  1.3<H>  /  DBili  x   /  AST  321<H>  /  ALT  1047<H>  /  AlkPhos  74      LIVER FUNCTIONS - ( 31 Aug 2022 00:14 )  Alb: 3.9 g/dL / Pro: 6.9 g/dL / ALK PHOS: 74 U/L / ALT: 1047 U/L / AST: 321 U/L / GGT: x           Creatinine Trend: 1.19<--, 1.62<--, 2.09<--, 2.32<--, 3.05<--, 2.53<--  PT/INR - ( 31 Aug 2022 00:14 )   PT: 22.8 sec;   INR: 1.97 ratio         PTT - ( 31 Aug 2022 00:14 )  PTT:29.4 sec  Urinalysis Basic - ( 29 Aug 2022 22:14 )    Color: Yellow / Appearance: Clear / S.012 / pH: x  Gluc: x / Ketone: Negative  / Bili: Negative / Urobili: Negative   Blood: x / Protein: Trace / Nitrite: Negative   Leuk Esterase: Negative / RBC: 14 /hpf / WBC 4 /HPF   Sq Epi: x / Non Sq Epi: 0 /hpf / Bacteria: Negative      Arterial Blood Gas:   @ 23:56  7.45/36/103/25/99.4/1.3  ABG lactate: --  Arterial Blood Gas:   @ 14:00  7.41/38/78/24/96.1/-0.3  ABG lactate: --  Arterial Blood Gas:   @ 05:29  7.41/29/125/18/99.3/-4.9  ABG lactate: --  Arterial Blood Gas:   @ 23:55  7.36/31/77/18/95.8/-6.7  ABG lactate: --  Arterial Blood Gas:   @ 22:00  7.32/28/78/14/95.8/-10.1  ABG lactate: --    Venous Blood Gas:   @ 18:30  7.13/47/31/16/35.0  VBG Lactate: 4.9      MICROBIOLOGY:     RADIOLOGY:  [ ] Reviewed and interpreted by me    EKG:      [facility]rian [facility] (ext [id number]) CHIEF COMPLAINT:  Patient is a 42y old  Female who presents with a chief complaint of Palpitations (30 Aug 2022 12:45)    HPI:      42 yr old female with PMHx of PulmHTN class I/VI (dx ) on continuous Treprostinil (Remodulin) (sildenafil) c/b Rt heart failure s/p PPM (Dual chamber ), Chronic P.E. on rivaroxaban (dx ), SVT s/p ablation (2020), Asthma (chronic steroid use - prednisone), Fe deficiency anemia who originally admitted to medicine floor  22 with c/o of palpitations accompanied by orthopnea/N/V/RUQ abd pain over a 2 day period.  This short hospital course c/b hypotension with SBP 80/41 accompanied by chest pain, transaminitis, EDOUARD with peak sCr 3.05(now resolved baseline sCr of .90-1.24) requiring transfer to MICU evening 22 for hypotension in setting of acute on chronic cor pulmonale/pulmHTN with end organ dysfunction (EDOUARD, transaminitis). Pt placed on dobutamine/diuretic/Marcela therapies       Interval Events:  received lasix 40 mg IVP this a.m.    REVIEW OF SYSTEMS:    CONSTITUTIONAL:           No weakness, fevers or chills, c/o H/A  EYES/ENT:           No visual changes;  No vertigo or throat pain   NECK:            No pain or stiffness  RESPIRATORY:            occasional productive cough, wheezing, without hemoptysis; without shortness of breath  CARDIOVASCULAR:            No chest pain or palpitations  GASTROINTESTINAL:           No abdominal or epigastric pain. No nausea, vomiting, or hematemesis; No diarrhea or constipation. No melena or hematochezia.  GENITOURINARY:            No dysuria, frequency or hematuria  NEUROLOGICAL:            No numbness or weakness  SKIN:            No pruritis , rashes          OBJECTIVE:  ICU Vital Signs Last 24 Hrs  T(C): 36.6 (31 Aug 2022 00:00), Max: 36.6 (30 Aug 2022 20:00)  T(F): 97.9 (31 Aug 2022 00:00), Max: 97.9 (31 Aug 2022 00:00)  HR: 98 (31 Aug 2022 06:00) (93 - 111)  BP: --  BP(mean): --  ABP: 107/64 (31 Aug 2022 06:00) (93/52 - 115/66)  ABP(mean): 79 (31 Aug 2022 06:00) (63 - 85)  RR: 18 (31 Aug 2022 06:00) (12 - 29)  SpO2: 97% (31 Aug 2022 06:00) (91% - 99%)    O2 Parameters below as of 31 Aug 2022 03:10  Patient On (Oxygen Delivery Method): nasal cannula  O2 Flow (L/min): 4             @ 07:  -   @ 07:00  --------------------------------------------------------  IN: 898 mL / OUT: 1250 mL / NET: -352 mL     @ 07:01  -  31 @ 06:13  --------------------------------------------------------  IN: 959.4 mL / OUT: 1750 mL / NET: -790.6 mL      CAPILLARY BLOOD GLUCOSE    PHYSICAL EXAM:  GENERAL:   NAD, well-groomed, obese    HEAD:    Atraumatic, Normocephalic    EYES:   EOMI, PERRLA 3 mm, conjunctiva and sclera clear    ENMT:   No oropharyngeal exudates, erythema or lesions,  Moist mucous membranes    NECK:   Supple, no cervical lymphadenopathy, no JVD    NERVOUS SYSTEM:  Alert & Oriented X3, CN II-XII intact, has spontaneous purposeful movement of all extremities; Upper extremities  4/5; Lower extremities 4/5, full sensation to light touch     CHEST/LUNG:   Utilizing 2 liters N/C with 10 PPM of Marcela   .Breath sounds bilaterally without  crackles, rhonchi, end inspiratory/expiratory wheezing in RLL field, without or rubs. POCUS A- line predominant with focal B lines in lower lung fields, curtain sign appreciated    HEART:   cardiac monitor sinus tach without ectopy   ; S1/S2 I/VI sys murmur, without rubs, or gallops, POCUS Good LVFx, hyperdynamic, poor RVFx, RV=LV, septal flattening and slight "D"ing appreciated, VTI 15, IVC 1.1-1.2    ABDOMEN:   Soft, Nontender, Nondistended; Bowel sounds present, Bladder non distended, non palpable    EXTREMITIES:   Pulses palpable radial pulses 2+ bilat, DP/PT 1+/1+ bilat, without clubbing, cyanosis. Digits warm to touch with good cap refill < 3 secs. Rt Radial A-line site without redness, infiltration, swelling, tenderness, pain, dressing dry and intact. Lt fem TLC site without redness, infiltration, swelling, tenderness, pain, dressing dry and intact     SKIN:   warm, dry, intact, normal color, no rash or abnormal lesions, without palpable nodes        HOSPITAL MEDICATIONS:  MEDICATIONS  (STANDING):  aztreonam  IVPB 2000 milliGRAM(s) IV Intermittent every 12 hours  chlorhexidine 4% Liquid 1 Application(s) Topical <User Schedule>  cyanocobalamin 1000 MICROGram(s) Oral daily  DOBUTamine Infusion 2.5 MICROgram(s)/kG/Min (7.82 mL/Hr) IV Continuous <Continuous>  ipratropium    for Nebulization 500 MICROGram(s) Nebulizer every 6 hours  mupirocin 2% Nasal 1 Application(s) Both Nostrils two times a day  pantoprazole    Tablet 40 milliGRAM(s) Oral before breakfast  polyethylene glycol 3350 17 Gram(s) Oral daily  predniSONE   Tablet 10 milliGRAM(s) Oral daily  Remodulin (Treprostinil) 5mG/mL 1 Vial(s) 1 Vial(s) SubCutaneous Continuous Pump  senna 2 Tablet(s) Oral at bedtime  spironolactone 25 milliGRAM(s) Oral daily    MEDICATIONS  (PRN):  aluminum hydroxide/magnesium hydroxide/simethicone Suspension 30 milliLiter(s) Oral every 4 hours PRN Dyspepsia  artificial  tears Solution 1 Drop(s) Both EYES three times a day PRN Dry Eyes  melatonin 3 milliGRAM(s) Oral at bedtime PRN Insomnia  ondansetron Injectable 4 milliGRAM(s) IV Push every 8 hours PRN Nausea and/or Vomiting  sodium chloride 0.9% lock flush 10 milliLiter(s) IV Push every 1 hour PRN Pre/post blood products, medications, blood draw, and to maintain line patency      LABS:                        14.5   12.57 )-----------( 301      ( 31 Aug 2022 00:14 )             45.0     Hgb Trend: 14.5<--, 14.2<--, 14.0<--, 15.2<--, 14.9<--  08-31    130<L>  |  97  |  23  ----------------------------<  105<H>  3.5   |  23  |  1.19    Ca    8.6      31 Aug 2022 00:14  Phos  3.0       Mg     2.2         TPro  6.9  /  Alb  3.9  /  TBili  1.3<H>  /  DBili  x   /  AST  321<H>  /  ALT  1047<H>  /  AlkPhos  74      LIVER FUNCTIONS - ( 31 Aug 2022 00:14 )  Alb: 3.9 g/dL / Pro: 6.9 g/dL / ALK PHOS: 74 U/L / ALT: 1047 U/L / AST: 321 U/L / GGT: x           Creatinine Trend: 1.19<--, 1.62<--, 2.09<--, 2.32<--, 3.05<--, 2.53<--  PT/INR - ( 31 Aug 2022 00:14 )   PT: 22.8 sec;   INR: 1.97 ratio         PTT - ( 31 Aug 2022 00:14 )  PTT:29.4 sec  Urinalysis Basic - ( 29 Aug 2022 22:14 )    Color: Yellow / Appearance: Clear / S.012 / pH: x  Gluc: x / Ketone: Negative  / Bili: Negative / Urobili: Negative   Blood: x / Protein: Trace / Nitrite: Negative   Leuk Esterase: Negative / RBC: 14 /hpf / WBC 4 /HPF   Sq Epi: x / Non Sq Epi: 0 /hpf / Bacteria: Negative      Arterial Blood Gas:   @ 23:56  7.45/36/103/25/99.4/1.3  ABG lactate: --  Arterial Blood Gas:   @ 14:00  7.41/38/78/24/96.1/-0.3  ABG lactate: --  Arterial Blood Gas:   @ 05:29  7.41/29/125/18/99.3/-4.9  ABG lactate: --  Arterial Blood Gas:   @ 23:55  7.36/31/77/18/95.8/-6.7  ABG lactate: --  Arterial Blood Gas:   @ 22:00  7.32/28/78/14/95.8/-10.1  ABG lactate: --    Venous Blood Gas:   @ 18:30  7.13/47/31/16/35.0  VBG Lactate: 4.9      MICROBIOLOGY:     RADIOLOGY:  [ ] Reviewed and interpreted by me    EKG:      Darrian [facility] (ext [phone]) CHIEF COMPLAINT:  Patient is a 42y old  Female who presents with a chief complaint of Palpitations (30 Aug 2022 12:45)    HPI:      42 yr old female with PMHx of PulmHTN class I/VI (dx ) on continuous Treprostinil (Remodulin) (sildenafil) c/b Rt heart failure s/p PPM (Dual chamber ), Chronic P.E. on rivaroxaban (dx ), SVT s/p ablation (2020), Asthma (chronic steroid use - prednisone), Fe deficiency anemia who originally admitted to medicine floor  22 with c/o of palpitations accompanied by orthopnea/N/V/RUQ abd pain over a 2 day period.  This short hospital course c/b hypotension with SBP 80/41 accompanied by chest pain, transaminitis, EDOUARD with peak sCr 3.05(now resolved baseline sCr of .90-1.24) requiring transfer to MICU evening 22 for hypotension in setting of acute on chronic cor pulmonale/pulmHTN with end organ dysfunction (EDOUARD, transaminitis). Pt placed on dobutamine/diuretic/Marcela therapies       Interval Events:  received lasix 40 mg IVP this a.m.    REVIEW OF SYSTEMS:    CONSTITUTIONAL:           No weakness, fevers or chills, c/o H/A  EYES/ENT:           No visual changes;  No vertigo or throat pain   NECK:            No pain or stiffness  RESPIRATORY:            occasional productive cough, wheezing, without hemoptysis; without shortness of breath  CARDIOVASCULAR:            No chest pain or palpitations  GASTROINTESTINAL:           No abdominal or epigastric pain. No nausea, vomiting, or hematemesis; No diarrhea or constipation. No melena or hematochezia.  GENITOURINARY:            No dysuria, frequency or hematuria  NEUROLOGICAL:            No numbness or weakness  SKIN:            No pruritis , rashes          OBJECTIVE:  ICU Vital Signs Last 24 Hrs  T(C): 36.6 (31 Aug 2022 00:00), Max: 36.6 (30 Aug 2022 20:00)  T(F): 97.9 (31 Aug 2022 00:00), Max: 97.9 (31 Aug 2022 00:00)  HR: 98 (31 Aug 2022 06:00) (93 - 111)  BP: --  BP(mean): --  ABP: 107/64 (31 Aug 2022 06:00) (93/52 - 115/66)  ABP(mean): 79 (31 Aug 2022 06:00) (63 - 85)  RR: 18 (31 Aug 2022 06:00) (12 - 29)  SpO2: 97% (31 Aug 2022 06:00) (91% - 99%)    O2 Parameters below as of 31 Aug 2022 03:10  Patient On (Oxygen Delivery Method): nasal cannula  O2 Flow (L/min): 4             @ 07:  -   @ 07:00  --------------------------------------------------------  IN: 898 mL / OUT: 1250 mL / NET: -352 mL     @ 07:01  -  31 @ 06:13  --------------------------------------------------------  IN: 959.4 mL / OUT: 1750 mL / NET: -790.6 mL      CAPILLARY BLOOD GLUCOSE    PHYSICAL EXAM:  GENERAL:   NAD, well-groomed, obese    HEAD:    Atraumatic, Normocephalic    EYES:   EOMI, PERRLA 3 mm, conjunctiva and sclera clear    ENMT:   No oropharyngeal exudates, erythema or lesions,  Moist mucous membranes    NECK:   Supple, no cervical lymphadenopathy, no JVD    NERVOUS SYSTEM:  Alert & Oriented X3, CN II-XII intact, has spontaneous purposeful movement of all extremities; Upper extremities  4/5; Lower extremities 4/5, full sensation to light touch     CHEST/LUNG:   Utilizing 3 liters N/C with 10 PPM of Marcela   .Breath sounds bilaterally without  crackles, rhonchi, end inspiratory/expiratory wheezing in RLL field, without or rubs. POCUS A- line predominant with focal B lines in lower lung fields, curtain sign appreciated    HEART:   cardiac monitor sinus tach without ectopy   ; S1/S2 I/VI sys murmur, without rubs, or gallops, POCUS Good LVFx, hyperdynamic, poor RVFx, RV=LV, septal flattening and slight "D"ing appreciated, VTI 15, IVC 1.1-1.2    ABDOMEN:   Soft, Nontender, Nondistended; Bowel sounds present, Bladder non distended, non palpable    EXTREMITIES:   Pulses palpable radial pulses 2+ bilat, DP/PT 1+/1+ bilat, without clubbing, cyanosis. Digits warm to touch with good cap refill < 3 secs. Rt Radial A-line site without redness, infiltration, swelling, tenderness, pain, dressing dry and intact. Lt fem TLC site without redness, infiltration, swelling, tenderness, pain, dressing dry and intact     SKIN:   warm, dry, intact, normal color, no rash or abnormal lesions, without palpable nodes        HOSPITAL MEDICATIONS:  MEDICATIONS  (STANDING):  aztreonam  IVPB 2000 milliGRAM(s) IV Intermittent every 12 hours  chlorhexidine 4% Liquid 1 Application(s) Topical <User Schedule>  cyanocobalamin 1000 MICROGram(s) Oral daily  DOBUTamine Infusion 2.5 MICROgram(s)/kG/Min (7.82 mL/Hr) IV Continuous <Continuous>  ipratropium    for Nebulization 500 MICROGram(s) Nebulizer every 6 hours  mupirocin 2% Nasal 1 Application(s) Both Nostrils two times a day  pantoprazole    Tablet 40 milliGRAM(s) Oral before breakfast  polyethylene glycol 3350 17 Gram(s) Oral daily  predniSONE   Tablet 10 milliGRAM(s) Oral daily  Remodulin (Treprostinil) 5mG/mL 1 Vial(s) 1 Vial(s) SubCutaneous Continuous Pump  senna 2 Tablet(s) Oral at bedtime  spironolactone 25 milliGRAM(s) Oral daily    MEDICATIONS  (PRN):  aluminum hydroxide/magnesium hydroxide/simethicone Suspension 30 milliLiter(s) Oral every 4 hours PRN Dyspepsia  artificial  tears Solution 1 Drop(s) Both EYES three times a day PRN Dry Eyes  melatonin 3 milliGRAM(s) Oral at bedtime PRN Insomnia  ondansetron Injectable 4 milliGRAM(s) IV Push every 8 hours PRN Nausea and/or Vomiting  sodium chloride 0.9% lock flush 10 milliLiter(s) IV Push every 1 hour PRN Pre/post blood products, medications, blood draw, and to maintain line patency      LABS:                        14.5   12.57 )-----------( 301      ( 31 Aug 2022 00:14 )             45.0     Hgb Trend: 14.5<--, 14.2<--, 14.0<--, 15.2<--, 14.9<--  08-31    130<L>  |  97  |  23  ----------------------------<  105<H>  3.5   |  23  |  1.19    Ca    8.6      31 Aug 2022 00:14  Phos  3.0       Mg     2.2         TPro  6.9  /  Alb  3.9  /  TBili  1.3<H>  /  DBili  x   /  AST  321<H>  /  ALT  1047<H>  /  AlkPhos  74      LIVER FUNCTIONS - ( 31 Aug 2022 00:14 )  Alb: 3.9 g/dL / Pro: 6.9 g/dL / ALK PHOS: 74 U/L / ALT: 1047 U/L / AST: 321 U/L / GGT: x           Creatinine Trend: 1.19<--, 1.62<--, 2.09<--, 2.32<--, 3.05<--, 2.53<--  PT/INR - ( 31 Aug 2022 00:14 )   PT: 22.8 sec;   INR: 1.97 ratio         PTT - ( 31 Aug 2022 00:14 )  PTT:29.4 sec  Urinalysis Basic - ( 29 Aug 2022 22:14 )    Color: Yellow / Appearance: Clear / S.012 / pH: x  Gluc: x / Ketone: Negative  / Bili: Negative / Urobili: Negative   Blood: x / Protein: Trace / Nitrite: Negative   Leuk Esterase: Negative / RBC: 14 /hpf / WBC 4 /HPF   Sq Epi: x / Non Sq Epi: 0 /hpf / Bacteria: Negative      Arterial Blood Gas:   @ 23:56  7.45/36/103/25/99.4/1.3  ABG lactate: --  Arterial Blood Gas:   @ 14:00  7.41/38/78/24/96.1/-0.3  ABG lactate: --  Arterial Blood Gas:   @ 05:29  7.41/29/125/18/99.3/-4.9  ABG lactate: --  Arterial Blood Gas:   @ 23:55  7.36/31/77/18/95.8/-6.7  ABG lactate: --  Arterial Blood Gas:   @ 22:00  7.32/28/78/14/95.8/-10.1  ABG lactate: --    Venous Blood Gas:   @ 18:30  7.13/47/31/16/35.0  VBG Lactate: 4.9      MICROBIOLOGY:     RADIOLOGY:  [ ] Reviewed and interpreted by me    EKG:      Darrian [facility] (ext [id number])

## 2022-08-31 NOTE — DIETITIAN INITIAL EVALUATION ADULT - REASON
I have reviewed and confirmed nurses' notes for patient's medications, allergies, medical history, and surgical history.
Deferred; pt in pain/trying to rest

## 2022-08-31 NOTE — PROGRESS NOTE ADULT - ASSESSMENT
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 The patient came in with worsening nausea, vomiting, dyspnea. She came to ED and she was found to be hypotensive with signs of end organ damage. Repeat labs from today show shock liver and EDOUARD. She has signs of Right sided heart failure on exam, BNP 47763.     Patient taken to MICU and started on dobutamine, started on inhaled nitro and continued on remodulin with clinical improvement. Plan to decrease nitro and then dobutamine

## 2022-08-31 NOTE — PROGRESS NOTE ADULT - ATTENDING COMMENTS
Briefly, 42F PMH PE on xarelto, pulmonary HTN (likely group I and group IV), ILD, JULIA, obesity presents to the hospital for 2 days of palpitations and abdominal pain/nausea/emesis. Denies any inciting event. Noted to have labile hypotension and labs notable for worsening transaminitis, BNP 9k (much higher than prior) as well as acute kidney injury. Feels improved since initiation of /Marcela. On exam, mild distress, JvP improved, prominent P2, tachycardic, CTAB, nontender abdomen, warm extremities. Labs reviewed - improving LFTs and renal function. TTE done which showed hyperdynamic and compressed LV, severe RV dysfunction, mod TR, dilated IVC. Repeat TTE with improved RV function however RA thrombus noted. Overall stage D, NYHA class IV with severe pulmonary HTN.  - c/w Marcela at 10 ppm and dobutamine 2.5 mcg/kg/min  - c/w Remodulin; d/w Dr. Yoon possible transition to IV Remodulin   - transition to lasix 40 mg PO daily   - plan for stat CTA and venous dopplers; possible plan for catheter-directed thrombus aspiration   - will need to evaluate if patient candidate for transplant but unlikely d/t BMI  - c/w AC; switch to hep gtt  - prognosis guarded

## 2022-08-31 NOTE — DIETITIAN INITIAL EVALUATION ADULT - PERTINENT MEDS FT
MEDICATIONS  (STANDING):  aztreonam  IVPB 2000 milliGRAM(s) IV Intermittent every 12 hours  chlorhexidine 4% Liquid 1 Application(s) Topical <User Schedule>  cyanocobalamin 1000 MICROGram(s) Oral daily  DOBUTamine Infusion 2.5 MICROgram(s)/kG/Min (7.82 mL/Hr) IV Continuous <Continuous>  ipratropium    for Nebulization 500 MICROGram(s) Nebulizer every 6 hours  mupirocin 2% Nasal 1 Application(s) Both Nostrils two times a day  pantoprazole    Tablet 40 milliGRAM(s) Oral before breakfast  polyethylene glycol 3350 17 Gram(s) Oral daily  predniSONE   Tablet 10 milliGRAM(s) Oral daily  Remodulin (Treprostinil) 5mG/mL 1 Vial(s) 1 Vial(s) SubCutaneous Continuous Pump  senna 2 Tablet(s) Oral at bedtime  spironolactone 25 milliGRAM(s) Oral daily    MEDICATIONS  (PRN):  aluminum hydroxide/magnesium hydroxide/simethicone Suspension 30 milliLiter(s) Oral every 4 hours PRN Dyspepsia  artificial  tears Solution 1 Drop(s) Both EYES three times a day PRN Dry Eyes  melatonin 3 milliGRAM(s) Oral at bedtime PRN Insomnia  ondansetron Injectable 4 milliGRAM(s) IV Push every 8 hours PRN Nausea and/or Vomiting  sodium chloride 0.9% lock flush 10 milliLiter(s) IV Push every 1 hour PRN Pre/post blood products, medications, blood draw, and to maintain line patency

## 2022-08-31 NOTE — PROGRESS NOTE ADULT - ASSESSMENT
Assessment:   42 yr old female with stated hx significant for PulmHTN class I/VI on Treprostinil therapy, Chronic PE who originally presented with palpitations accompanied by orthopnea/N/V/RUQ abd pain over a 2 day period. Course c/b hypotension secondary to acute on chronic cor pulmonale with end organ dysfx (EDOUARD, transaminitis).    Plan:    #Neuro:    #Pulm:    #CV:    #GI/:    #ID:    #FEN/ENDO/HEME:           Assessment:   42 yr old female with stated hx significant for PulmHTN class I/VI on Treprostinil therapy, Chronic PE who originally presented with palpitations accompanied by orthopnea/N/V/RUQ abd pain over a 2 day period. Course c/b hypotension secondary to acute on chronic cor pulmonale with end organ dysfx (EDOUARD, transaminitis).    Plan:    #Neuro:  =no active issues, c/o headache  -Neuro checks q 2 hrs and prn for changes  -activity as tolerated  -physical therapy consult when stable  -dilauded 0.5 mg IV prn    #Pulm:  =Asthma, Cor pulmonale, PulmHTN (class I/VI)  -Supplemental O2 prn to maintain SPO2 > 92% (currently 3 liters N/C)  -Ipratropium q 6 hrs prn for sob/wheezes  -HOB >/= 30 degree angle  -consider decreasing Marcela as tolerated  -continue with subq Treprostinil therapy with eventual plan for IV  -consider obtaining I.R. consult for tunnel catheter placement   -home med of sildenafil 10mg po qd - currently on hold due to HYPOtn episode. As BP improves will restart    #CV:  =resolving acute on chronic cor pulmonale due to pulmHTN  -resolved HYPOtn  -dobutamine gtt - currently on 2.5 mcg/kg/min -consider d/c   -obtain TTE to eval RVFx/LVFx   -Treprostinil therapy as above    #GI/:    #ID:    #FEN/ENDO/HEME:  -Obtain methemoglobin level (Marcela)         Assessment:   42 yr old female with stated hx significant for PulmHTN class I/VI on Treprostinil therapy, Chronic PE who originally presented with palpitations accompanied by orthopnea/N/V/RUQ abd pain over a 2 day period. Course c/b hypotension secondary to acute on chronic cor pulmonale with end organ dysfx (EDOUARD, transaminitis).    Plan:    #Neuro:  =no active issues, c/o headache  -Neuro checks q 2 hrs and prn for changes  -activity as tolerated  -physical therapy consult when stable  -dilauded 0.5 mg IV prn    #Pulm:  =Asthma, Cor pulmonale, PulmHTN (class I/VI)  -Supplemental O2 prn to maintain SPO2 > 92% (currently 3 liters N/C)  -Ipratropium q 6 hrs prn for sob/wheezes  -HOB >/= 30 degree angle  -consider decreasing Marcela as tolerated  -continue with subq Treprostinil therapy with eventual plan for IV  -consider obtaining I.R. consult for tunnel catheter placement   -home med of sildenafil 10mg po qd - currently on hold due to HYPOtn episode. As BP improves will restart    #CV:  =resolving acute on chronic cor pulmonale due to pulmHTN  -resolved HYPOtn  -dobutamine gtt - currently on 2.5 mcg/kg/min -consider d/c   -obtain TTE to eval RVFx/LVFx   -Treprostinil therapy as above  -home med of rivaroxaban 10 mg po qd - currently on hold - will discuss with I.R. placement of tunnel cath prior to restart    #GI/:  =resolving RUQ pain, improving transaminitis (most likely due to congestive hepatopathy), resolving EDOUARD (most likely pre-renal)  -received RUQ U.S. 8/30/22 - CBD 7 mm, mild hepatic steatosis  -strict I & O's - keep negative  -lasix 40 mg IVP x 1 given today 8/31/22  -will consider add back lasix  20 mg po qd therapy     #ID:  =resolving sepsis  -Blood Culture 8/29/22 - NGTD    -will complete aztreonam therapy - will d/c 9/1/22    #FEN/ENDO/HEME:  =improving transaminitis, resolving elevate INR  -obtain CMP/Mg++/PO--4/CBC w diff/PT/PTT/INR now and q. a.m.  -Obtain methemoglobin level (Marcela)  -trend LFT's         Assessment:   42 yr old female with stated hx significant for PulmHTN class I/VI on Treprostinil therapy, Chronic PE who originally presented with palpitations accompanied by orthopnea/N/V/RUQ abd pain over a 2 day period. Course c/b hypotension secondary to acute on chronic cor pulmonale with end organ dysfx (EDOUARD, transaminitis).    Plan:    #Neuro:  =no active issues, c/o headache  -Neuro checks q 2 hrs and prn for changes  -activity as tolerated  -physical therapy consult when stable  -dilauded 0.5 mg IV prn    #Pulm:  =Asthma, Cor pulmonale, PulmHTN (class I/VI)  -Supplemental O2 prn to maintain SPO2 > 92% (currently 3 liters N/C)  -Ipratropium q 6 hrs prn for sob/wheezes  -HOB >/= 30 degree angle  -consider decreasing Marcela as tolerated  -continue with subq Treprostinil therapy with eventual plan for IV  -consider obtaining I.R. consult for tunnel catheter placement   -home med of sildenafil 10mg po qd - currently on hold due to HYPOtn episode. As BP improves will restart    #CV:  =resolving acute on chronic cor pulmonale due to pulmHTN  -resolved HYPOtn  -dobutamine gtt - currently on 2.5 mcg/kg/min -consider d/c   -obtain TTE to eval RVFx/LVFx   -Treprostinil therapy as above  -home med of rivaroxaban 10 mg po qd - currently on hold - will discuss with I.R. placement of tunnel cath prior to restart    #GI/:  =resolving RUQ pain, improving transaminitis (most likely due to congestive hepatopathy), resolving EDOUARD (most likely pre-renal)  -received RUQ U.S. 8/30/22 - CBD 7 mm, mild hepatic steatosis  -strict I & O's - keep negative  -lasix 40 mg IVP x 1 given today 8/31/22  -will consider add back lasix  20 mg po qd therapy     #ID:  =resolving sepsis  -Blood Culture 8/29/22 - NGTD  -MRSA/MSSA 8/29/22 - Detected  -Blood Cultrues 8/29/22 - Staph Lugdunensis  -RVP 8/28/22 - ND  -SARS-CoV-2 8/28/22 - ND   -continue Mupirocin 2% therapy q 12 hrs x5 days (started 8/30/22)  -will complete aztreonam therapy - will d/c 9/1/22    #FEN/ENDO/HEME:  =improving transaminitis, resolving elevate INR  -obtain CMP/Mg++/PO--4/CBC w diff/PT/PTT/INR now and q. a.m.  -Obtain methemoglobin level (Marcela)  -trend LFT's

## 2022-08-31 NOTE — DIETITIAN INITIAL EVALUATION ADULT - OTHER INFO
Wt Hx: Dosing wt 104.3 kG. Daily wt 104 kG/229.2 lbs (8/29). UBW ~220 lbs last known to weigh 1 month ago, believes to have unintentionally lost wt secondary to inadequate intake. RD obtained wt ~210 lbs (8/31), indicates 10 lb wt loss x1 month (4.5%). RD will continue to trend as new wts available/able.     Nutrition-Related Concerns:   - Ordered for Vitamin B12  - RUQ discomfort likely hepatic congestion due to acute right heart failure per MICU  - EDOUARD improving

## 2022-08-31 NOTE — PROGRESS NOTE ADULT - PROBLEM SELECTOR PLAN 1
Elevated BNP, +LE edema,  BNP is 14K with evidence of end organ damage now improving   Transferred to MICU   - Wean  after nitro   - K>4, Mg >2  - Monitor I/O, daily weights   - Start lasix 40 IV QD for net negative

## 2022-08-31 NOTE — CHART NOTE - NSCHARTNOTEFT_GEN_A_CORE
:  Girish Velasquez    INDICATION: Asessment    PROCEDURE:  [xxxx ] LIMITED ECHO  [xxxx ] LIMITED CHEST  [ ] LIMITED RETROPERITONEAL  [ ] LIMITED ABDOMINAL  [ ] LIMITED DVT  [ ] NEEDLE GUIDANCE VASCULAR  [ ] NEEDLE GUIDANCE THORACENTESIS  [ ] NEEDLE GUIDANCE PARACENTESIS  [ ] NEEDLE GUIDANCE PERICARDIOCENTESIS  [ ] OTHER    FINDINGS:  Good LVFx, hyperdynamic, poor RVFx, RV=LV, septal flattening and slight "D"ing appreciated, VTI 15, IVC 1.2    A- line predominant with focal B lines in lower lung fields, curtain sign appreciated    INTERPRETATION:  RVE with "D"ing and flattening of septum in setting of RVF and pulmHTN    Lungs with A line predominance :  Girish Velasquez    INDICATION: Asessment    PROCEDURE:  [xxxx ] LIMITED ECHO  [xxxx ] LIMITED CHEST  [ ] LIMITED RETROPERITONEAL  [ ] LIMITED ABDOMINAL  [ ] LIMITED DVT  [ ] NEEDLE GUIDANCE VASCULAR  [ ] NEEDLE GUIDANCE THORACENTESIS  [ ] NEEDLE GUIDANCE PARACENTESIS  [ ] NEEDLE GUIDANCE PERICARDIOCENTESIS  [ ] OTHER    FINDINGS:  Good LVFx, hyperdynamic, poor RVFx, RV=LV, septal flattening and slight "D"ing appreciated, VTI 15, IVC 1.1-1.2    A- line predominant with focal B lines in lower lung fields, curtain sign appreciated    INTERPRETATION:  RVE with "D"ing and flattening of septum in setting of RVF and pulmHTN    Lungs with A line predominance :  Girish Velasquez    INDICATION: acute hypoxemic respiratory failure     PROCEDURE:  [xxxx ] LIMITED ECHO  [xxxx ] LIMITED CHEST  [ ] LIMITED RETROPERITONEAL  [ ] LIMITED ABDOMINAL  [ ] LIMITED DVT  [ ] NEEDLE GUIDANCE VASCULAR  [ ] NEEDLE GUIDANCE THORACENTESIS  [ ] NEEDLE GUIDANCE PARACENTESIS  [ ] NEEDLE GUIDANCE PERICARDIOCENTESIS  [ ] OTHER    FINDINGS:  Normal LVSF, appears less hyperdynamic than prior.  RV dilated but appears improved from prior, RV=LV, septal flattening and slight "D"ing appreciated, IVC 1.1-1.2    A- line predominant with focal B lines in lower lung fields, curtain sign appreciated    INTERPRETATION:  RVE with "D"ing and flattening of septum in setting of RVF and pulmHTN    Lungs with A line predominance    Images stored in [employer]    Attending Attestation:  I was present during the key portions of the procedure and immediately available during the entire procedure.  Chely Randall MD  Attending  Pulmonary & Critical Care Medicine

## 2022-08-31 NOTE — PROGRESS NOTE ADULT - ATTENDING COMMENTS
42 F with Group I/?IV pulm htn on Remodulin pump, PE on xarelto here with sob and palpitations, found to have acute on chronic cor pulmonale and acute on chronic hypoxic respiratory failure with shock    clinically improved today overall, more alert, less pain and dyspnea.  Still with headache    - c/w dobutamine for now at 2.5 given tachycardia; will get formal repeat TTE today  - c/w Marcela at 10 ppm via NC for now 4 L, will try to wean off Macrela today after discussion with Dr. Yoon  - EDOUARD improving; given IV diuresis again this AM, will try to keep even  - c/w remodulin pump at 42 ng/kg/min; will try to transition to IV remodulin once pharmacy has it available  - empiric abx pending cultures, will try to remove shiley  - INR likely related to congestive hepatopathy, monitor off eliquis for now, but may restart    Critically ill patient requiring frequent bedside visits with therapy changes.

## 2022-08-31 NOTE — CONSULT NOTE ADULT - SUBJECTIVE AND OBJECTIVE BOX
HPI:  43 yo lady PMHx of PE on xarelto, WHO group 1 and 4 pulmonary HTN, ILD, JULIA, and obesity who was admitted for stage D, NYHA class IV heart failure w/ severe pulmonary HTN now in MICU on Marcela, dobutamine, remodulin, and lasix. Found to have new echogenic mass in the RA in TTE measuring 1.8 cm x 3.1 cm likely representing clot in transit.    In the time of examination the patient denies any chest pain/tightness or discomfort, fevers, chills, sweats, and sensation, dizziness or palpitations.  Denies any recent swelling in her legs.  Denies any trauma, recent immobility or blood in her stool/sputum.    She reports taking her blood thinners as prescribed.    PMHx/PSHx:   PE on xarelto  WHO group 1 and 4 pulmonary   HTN  ILD  JULIA  obesity    FMHx/SHx:   Noncontributory for premature coronary disease sudden cardiac death  The patient denies any alcohol use, tobacco use illicit drug use    Physical examination  GENERAL: No acute distress, well-developed  HEAD:  Atraumatic, Normocephalic  ENT: EOMI, PERRLA, conjunctiva and sclera clear, Neck supple, No JVD, moist mucosa  CHEST/LUNG: Clear to auscultation bilaterally; No wheeze, equal breath sounds bilaterally   BACK: No spinal tenderness  HEART: Regular rate and rhythm; No murmurs, rubs, or gallops  ABDOMEN: Soft, Nontender, Nondistended; Bowel sounds present  EXTREMITIES:  No clubbing, cyanosis, or edema  PSYCH: Nl behavior, nl affect  NEUROLOGY: AAOx3, non-focal, cranial nerves intact  SKIN: Normal color, No rashes or lesions.  Left groin triple-lumen catheter is noted with no hematoma/bruit  2+ pulse w/o hematoma or bleeding, with tenderness R>L.     Assessment/plan  Concern for clot in transit and right atrium  --There is concern that the patient has clot in her right atrium that was not previously seen on prior echocardiogram studies.  The patient's echocardiogram images were reviewed with echocardiography attending.  Multidisciplinary meeting was had with MICU, heart failure, pulmonary hypertension and cardiac surgery reviewing findings on echocardiogram.  There is concern that in this patient who has systemic pulmonary pressures that if she does have acute thrombus in her right atrium that was to embolize it could lead to hemodynamic instability/deterioration and potential death.  Due to these findings it is recommended by the patient's team that she undergo catheter-based mechanical thrombectomy.  Indications and details for the procedure were reviewed.  Benefits and risks were gone over.  Risks include but are not limited to infection, bleeding, arrhythmia, TIA/stroke, hemodynamic instability, vascular injury, need for urgent surgery and death.  The patient was also informed that if the clot is more chronic in nature it could be adhered to the wall of her heart and it may not be amenable for catheter-based removal.  The patient is agreeable to proceed.  Prior to proceeding it is recommended that a CTA of the heart be performed to assess for pulmonary embolism.    All questions and concerns of the patient and her  who was called on the phone were addressed.    Findings and plan were discussed with heart failure/Dr. Chavis, MICU team, pulmonary hypertension specialist /Dr. Yoon, echo attending/Dr. Lloyd and cardiac surgery team/Dr. Grullon.    Time-based billing (NON-critical care).     85 minutes spent on total encounter; more than 50% of the visit was spent counseling and / or coordinating care by the attending physician.  The necessity of the time spent during the encounter on this date of service was due to:     education, assessment and coordination of care.

## 2022-09-01 DIAGNOSIS — I51.3 INTRACARDIAC THROMBOSIS, NOT ELSEWHERE CLASSIFIED: ICD-10-CM

## 2022-09-01 LAB
ALBUMIN SERPL ELPH-MCNC: 3.4 G/DL — SIGNIFICANT CHANGE UP (ref 3.3–5)
ALBUMIN SERPL ELPH-MCNC: 3.8 G/DL — SIGNIFICANT CHANGE UP (ref 3.3–5)
ALP SERPL-CCNC: 63 U/L — SIGNIFICANT CHANGE UP (ref 40–120)
ALP SERPL-CCNC: 67 U/L — SIGNIFICANT CHANGE UP (ref 40–120)
ALT FLD-CCNC: 546 U/L — HIGH (ref 10–45)
ALT FLD-CCNC: 630 U/L — HIGH (ref 10–45)
ANION GAP SERPL CALC-SCNC: 13 MMOL/L — SIGNIFICANT CHANGE UP (ref 5–17)
ANION GAP SERPL CALC-SCNC: 9 MMOL/L — SIGNIFICANT CHANGE UP (ref 5–17)
APTT BLD: 31.5 SEC — SIGNIFICANT CHANGE UP (ref 27.5–35.5)
APTT BLD: 52.3 SEC — HIGH (ref 27.5–35.5)
APTT BLD: 57.2 SEC — HIGH (ref 27.5–35.5)
AST SERPL-CCNC: 65 U/L — HIGH (ref 10–40)
AST SERPL-CCNC: 86 U/L — HIGH (ref 10–40)
BASE EXCESS BLDV CALC-SCNC: 2.5 MMOL/L — SIGNIFICANT CHANGE UP (ref -2–3)
BASE EXCESS BLDV CALC-SCNC: 4.5 MMOL/L — HIGH (ref -2–3)
BILIRUB SERPL-MCNC: 0.9 MG/DL — SIGNIFICANT CHANGE UP (ref 0.2–1.2)
BILIRUB SERPL-MCNC: 1 MG/DL — SIGNIFICANT CHANGE UP (ref 0.2–1.2)
BUN SERPL-MCNC: 11 MG/DL — SIGNIFICANT CHANGE UP (ref 7–23)
BUN SERPL-MCNC: 14 MG/DL — SIGNIFICANT CHANGE UP (ref 7–23)
CA-I SERPL-SCNC: 1.09 MMOL/L — LOW (ref 1.15–1.33)
CA-I SERPL-SCNC: 1.11 MMOL/L — LOW (ref 1.15–1.33)
CALCIUM SERPL-MCNC: 8 MG/DL — LOW (ref 8.4–10.5)
CALCIUM SERPL-MCNC: 8.4 MG/DL — SIGNIFICANT CHANGE UP (ref 8.4–10.5)
CHLORIDE BLDV-SCNC: 95 MMOL/L — LOW (ref 96–108)
CHLORIDE BLDV-SCNC: 97 MMOL/L — SIGNIFICANT CHANGE UP (ref 96–108)
CHLORIDE SERPL-SCNC: 96 MMOL/L — SIGNIFICANT CHANGE UP (ref 96–108)
CHLORIDE SERPL-SCNC: 97 MMOL/L — SIGNIFICANT CHANGE UP (ref 96–108)
CO2 BLDV-SCNC: 30 MMOL/L — HIGH (ref 22–26)
CO2 BLDV-SCNC: 32 MMOL/L — HIGH (ref 22–26)
CO2 SERPL-SCNC: 21 MMOL/L — LOW (ref 22–31)
CO2 SERPL-SCNC: 25 MMOL/L — SIGNIFICANT CHANGE UP (ref 22–31)
CREAT SERPL-MCNC: 0.88 MG/DL — SIGNIFICANT CHANGE UP (ref 0.5–1.3)
CREAT SERPL-MCNC: 0.92 MG/DL — SIGNIFICANT CHANGE UP (ref 0.5–1.3)
EGFR: 80 ML/MIN/1.73M2 — SIGNIFICANT CHANGE UP
EGFR: 84 ML/MIN/1.73M2 — SIGNIFICANT CHANGE UP
GAS PNL BLDA: SIGNIFICANT CHANGE UP
GAS PNL BLDA: SIGNIFICANT CHANGE UP
GAS PNL BLDV: 128 MMOL/L — LOW (ref 136–145)
GAS PNL BLDV: 129 MMOL/L — LOW (ref 136–145)
GAS PNL BLDV: SIGNIFICANT CHANGE UP
GLUCOSE BLDV-MCNC: 105 MG/DL — HIGH (ref 70–99)
GLUCOSE BLDV-MCNC: 105 MG/DL — HIGH (ref 70–99)
GLUCOSE SERPL-MCNC: 115 MG/DL — HIGH (ref 70–99)
GLUCOSE SERPL-MCNC: 149 MG/DL — HIGH (ref 70–99)
GRAM STN FLD: SIGNIFICANT CHANGE UP
GRAM STN FLD: SIGNIFICANT CHANGE UP
HCO3 BLDV-SCNC: 28 MMOL/L — SIGNIFICANT CHANGE UP (ref 22–29)
HCO3 BLDV-SCNC: 31 MMOL/L — HIGH (ref 22–29)
HCT VFR BLD CALC: 39.5 % — SIGNIFICANT CHANGE UP (ref 34.5–45)
HCT VFR BLD CALC: 41.3 % — SIGNIFICANT CHANGE UP (ref 34.5–45)
HCT VFR BLDA CALC: 39 % — SIGNIFICANT CHANGE UP (ref 34.5–46.5)
HCT VFR BLDA CALC: 39 % — SIGNIFICANT CHANGE UP (ref 34.5–46.5)
HGB BLD CALC-MCNC: 13.1 G/DL — SIGNIFICANT CHANGE UP (ref 11.7–16.1)
HGB BLD CALC-MCNC: 13.1 G/DL — SIGNIFICANT CHANGE UP (ref 11.7–16.1)
HGB BLD-MCNC: 12.7 G/DL — SIGNIFICANT CHANGE UP (ref 11.5–15.5)
HGB BLD-MCNC: 13 G/DL — SIGNIFICANT CHANGE UP (ref 11.5–15.5)
HOROWITZ INDEX BLDV+IHG-RTO: 31 — SIGNIFICANT CHANGE UP
INR BLD: 1.28 RATIO — HIGH (ref 0.88–1.16)
INR BLD: 1.33 RATIO — HIGH (ref 0.88–1.16)
INR BLD: 1.37 RATIO — HIGH (ref 0.88–1.16)
LACTATE BLDV-MCNC: 1.4 MMOL/L — SIGNIFICANT CHANGE UP (ref 0.5–2)
LACTATE BLDV-MCNC: 1.5 MMOL/L — SIGNIFICANT CHANGE UP (ref 0.5–2)
MAGNESIUM SERPL-MCNC: 1.8 MG/DL — SIGNIFICANT CHANGE UP (ref 1.6–2.6)
MAGNESIUM SERPL-MCNC: 1.8 MG/DL — SIGNIFICANT CHANGE UP (ref 1.6–2.6)
MCHC RBC-ENTMCNC: 22.4 PG — LOW (ref 27–34)
MCHC RBC-ENTMCNC: 22.6 PG — LOW (ref 27–34)
MCHC RBC-ENTMCNC: 31.5 GM/DL — LOW (ref 32–36)
MCHC RBC-ENTMCNC: 32.2 GM/DL — SIGNIFICANT CHANGE UP (ref 32–36)
MCV RBC AUTO: 70.3 FL — LOW (ref 80–100)
MCV RBC AUTO: 71.1 FL — LOW (ref 80–100)
NRBC # BLD: 0 /100 WBCS — SIGNIFICANT CHANGE UP (ref 0–0)
NRBC # BLD: 0 /100 WBCS — SIGNIFICANT CHANGE UP (ref 0–0)
PCO2 BLDV: 48 MMHG — HIGH (ref 39–42)
PCO2 BLDV: 52 MMHG — HIGH (ref 39–42)
PH BLDV: 7.38 — SIGNIFICANT CHANGE UP (ref 7.32–7.43)
PH BLDV: 7.38 — SIGNIFICANT CHANGE UP (ref 7.32–7.43)
PHOSPHATE SERPL-MCNC: 2.8 MG/DL — SIGNIFICANT CHANGE UP (ref 2.5–4.5)
PHOSPHATE SERPL-MCNC: 3.1 MG/DL — SIGNIFICANT CHANGE UP (ref 2.5–4.5)
PLATELET # BLD AUTO: 272 K/UL — SIGNIFICANT CHANGE UP (ref 150–400)
PLATELET # BLD AUTO: 297 K/UL — SIGNIFICANT CHANGE UP (ref 150–400)
PO2 BLDV: 39 MMHG — SIGNIFICANT CHANGE UP (ref 25–45)
PO2 BLDV: 42 MMHG — SIGNIFICANT CHANGE UP (ref 25–45)
POTASSIUM BLDV-SCNC: 4.1 MMOL/L — SIGNIFICANT CHANGE UP (ref 3.5–5.1)
POTASSIUM BLDV-SCNC: 5.1 MMOL/L — SIGNIFICANT CHANGE UP (ref 3.5–5.1)
POTASSIUM SERPL-MCNC: 3.4 MMOL/L — LOW (ref 3.5–5.3)
POTASSIUM SERPL-MCNC: 4.9 MMOL/L — SIGNIFICANT CHANGE UP (ref 3.5–5.3)
POTASSIUM SERPL-SCNC: 3.4 MMOL/L — LOW (ref 3.5–5.3)
POTASSIUM SERPL-SCNC: 4.9 MMOL/L — SIGNIFICANT CHANGE UP (ref 3.5–5.3)
PROT SERPL-MCNC: 6.6 G/DL — SIGNIFICANT CHANGE UP (ref 6–8.3)
PROT SERPL-MCNC: 6.7 G/DL — SIGNIFICANT CHANGE UP (ref 6–8.3)
PROTHROM AB SERPL-ACNC: 14.9 SEC — HIGH (ref 10.5–13.4)
PROTHROM AB SERPL-ACNC: 15.5 SEC — HIGH (ref 10.5–13.4)
PROTHROM AB SERPL-ACNC: 16 SEC — HIGH (ref 10.5–13.4)
RBC # BLD: 5.62 M/UL — HIGH (ref 3.8–5.2)
RBC # BLD: 5.81 M/UL — HIGH (ref 3.8–5.2)
RBC # FLD: 17.1 % — HIGH (ref 10.3–14.5)
RBC # FLD: 17.5 % — HIGH (ref 10.3–14.5)
SAO2 % BLDV: 61.5 % — LOW (ref 67–88)
SAO2 % BLDV: 73.3 % — SIGNIFICANT CHANGE UP (ref 67–88)
SODIUM SERPL-SCNC: 130 MMOL/L — LOW (ref 135–145)
SODIUM SERPL-SCNC: 131 MMOL/L — LOW (ref 135–145)
SPECIMEN SOURCE: SIGNIFICANT CHANGE UP
WBC # BLD: 12.71 K/UL — HIGH (ref 3.8–10.5)
WBC # BLD: 13.21 K/UL — HIGH (ref 3.8–10.5)
WBC # FLD AUTO: 12.71 K/UL — HIGH (ref 3.8–10.5)
WBC # FLD AUTO: 13.21 K/UL — HIGH (ref 3.8–10.5)

## 2022-09-01 PROCEDURE — 99291 CRITICAL CARE FIRST HOUR: CPT

## 2022-09-01 PROCEDURE — 93970 EXTREMITY STUDY: CPT | Mod: 26

## 2022-09-01 PROCEDURE — 93308 TTE F-UP OR LMTD: CPT | Mod: 26,GC

## 2022-09-01 PROCEDURE — 99233 SBSQ HOSP IP/OBS HIGH 50: CPT

## 2022-09-01 PROCEDURE — 99291 CRITICAL CARE FIRST HOUR: CPT | Mod: GC

## 2022-09-01 PROCEDURE — 76604 US EXAM CHEST: CPT | Mod: 26,GC

## 2022-09-01 RX ORDER — POTASSIUM CHLORIDE 20 MEQ
20 PACKET (EA) ORAL ONCE
Refills: 0 | Status: DISCONTINUED | OUTPATIENT
Start: 2022-09-01 | End: 2022-09-01

## 2022-09-01 RX ORDER — MAGNESIUM SULFATE 500 MG/ML
1 VIAL (ML) INJECTION ONCE
Refills: 0 | Status: COMPLETED | OUTPATIENT
Start: 2022-09-01 | End: 2022-09-01

## 2022-09-01 RX ORDER — IPRATROPIUM BROMIDE 0.2 MG/ML
1 SOLUTION, NON-ORAL INHALATION EVERY 6 HOURS
Refills: 0 | Status: DISCONTINUED | OUTPATIENT
Start: 2022-09-01 | End: 2022-09-01

## 2022-09-01 RX ORDER — POTASSIUM CHLORIDE 20 MEQ
40 PACKET (EA) ORAL ONCE
Refills: 0 | Status: COMPLETED | OUTPATIENT
Start: 2022-09-01 | End: 2022-09-01

## 2022-09-01 RX ORDER — IPRATROPIUM BROMIDE 0.2 MG/ML
500 SOLUTION, NON-ORAL INHALATION EVERY 6 HOURS
Refills: 0 | Status: DISCONTINUED | OUTPATIENT
Start: 2022-09-01 | End: 2022-09-03

## 2022-09-01 RX ORDER — FUROSEMIDE 40 MG
40 TABLET ORAL DAILY
Refills: 0 | Status: DISCONTINUED | OUTPATIENT
Start: 2022-09-01 | End: 2022-09-01

## 2022-09-01 RX ADMIN — Medication 40 MILLIGRAM(S): at 05:03

## 2022-09-01 RX ADMIN — PREGABALIN 1000 MICROGRAM(S): 225 CAPSULE ORAL at 11:30

## 2022-09-01 RX ADMIN — Medication 7.82 MICROGRAM(S)/KG/MIN: at 19:47

## 2022-09-01 RX ADMIN — MUPIROCIN 1 APPLICATION(S): 20 OINTMENT TOPICAL at 18:11

## 2022-09-01 RX ADMIN — Medication 7.82 MICROGRAM(S)/KG/MIN: at 09:36

## 2022-09-01 RX ADMIN — Medication 10 MILLIGRAM(S): at 05:03

## 2022-09-01 RX ADMIN — SPIRONOLACTONE 25 MILLIGRAM(S): 25 TABLET, FILM COATED ORAL at 05:04

## 2022-09-01 RX ADMIN — PANTOPRAZOLE SODIUM 40 MILLIGRAM(S): 20 TABLET, DELAYED RELEASE ORAL at 05:04

## 2022-09-01 RX ADMIN — Medication 500 MICROGRAM(S): at 18:14

## 2022-09-01 RX ADMIN — ONDANSETRON 4 MILLIGRAM(S): 8 TABLET, FILM COATED ORAL at 11:27

## 2022-09-01 RX ADMIN — CHLORHEXIDINE GLUCONATE 1 APPLICATION(S): 213 SOLUTION TOPICAL at 05:05

## 2022-09-01 RX ADMIN — Medication 100 GRAM(S): at 02:18

## 2022-09-01 RX ADMIN — HEPARIN SODIUM 11 UNIT(S)/HR: 5000 INJECTION INTRAVENOUS; SUBCUTANEOUS at 01:48

## 2022-09-01 RX ADMIN — HEPARIN SODIUM 12 UNIT(S)/HR: 5000 INJECTION INTRAVENOUS; SUBCUTANEOUS at 18:30

## 2022-09-01 RX ADMIN — HEPARIN SODIUM 12 UNIT(S)/HR: 5000 INJECTION INTRAVENOUS; SUBCUTANEOUS at 09:39

## 2022-09-01 RX ADMIN — Medication 500 MICROGRAM(S): at 05:23

## 2022-09-01 RX ADMIN — Medication 500 MICROGRAM(S): at 23:27

## 2022-09-01 RX ADMIN — Medication 500 MICROGRAM(S): at 11:57

## 2022-09-01 RX ADMIN — Medication 100 MILLIGRAM(S): at 05:05

## 2022-09-01 RX ADMIN — Medication 40 MILLIEQUIVALENT(S): at 05:05

## 2022-09-01 RX ADMIN — MUPIROCIN 1 APPLICATION(S): 20 OINTMENT TOPICAL at 05:49

## 2022-09-01 NOTE — CONSULT NOTE ADULT - ASSESSMENT
Assessment:  1) PE on xarelto  2) WHO group 1 and 4 pulmonary HTN c/b stage D, NYHA class IV heart failure w/ severe pulmonary HTN now in MICU on Marcela, dobutamine, remodulin, and lasix  3) New echogenic mass in the RA in TTE measuring 1.8 cm x 3.1 cm likely representing clot in transit, s/p catheter-directed thrombus aspiration on 08/31    Plan:  1) Okay to remove stitches on R femoral access site  2) Heparin gtt for AC  3) B/l LE venous duplex     Thank you    Lalo Hernandez M.D.  PGY6 Vascular Cardiology/ Vascular Medicine Fellow    Please call with any questions:  Service Line: [phone]  Office: [phone]

## 2022-09-01 NOTE — PROGRESS NOTE ADULT - ATTENDING COMMENTS
Briefly, 42F PMH PE on xarelto, pulmonary HTN (likely group I and group IV), ILD, JULIA, obesity presents to the hospital for 2 days of palpitations and abdominal pain/nausea/emesis. Denies any inciting event. Noted to have labile hypotension and labs notable for worsening transaminitis, BNP 9k (much higher than prior) as well as acute kidney injury. Feels improved since initiation of /Marcela. Found to have RA clot on repeat TTE and underwent CTA which did not reveal any PE but went for aspiration which was difficult but well tolerated. Changed to heparin gtt. On exam, JVP normal, prominent P2, tachycardic, CTAB, nontender abdomen, warm extremities. Labs reviewed - improving LFTs and renal function normalized. TTE done which showed hyperdynamic and compressed LV, severe RV dysfunction, mod TR, dilated IVC. Repeat TTE with improved RV function however RA thrombus noted. Overall stage D, NYHA class IV with severe pulmonary HTN.  - on Marcela at 10 ppm and dobutamine 2.5 mcg/kg/min; plan to wean Marcela to 5 ppm and follow labs closely   - c/w Remodulin; d/w Dr. Yoon possible transition to IV Remodulin   - d/c lasix for now; start jacqueline 25 mg daily   - will need to evaluate if patient candidate for transplant but unlikely d/t BMI  - prognosis guarded

## 2022-09-01 NOTE — PROGRESS NOTE ADULT - SUBJECTIVE AND OBJECTIVE BOX
CHIEF COMPLAINT:  Patient is a 42y old  Female who presents with a chief complaint of Palpitations (31 Aug 2022 13:36)    HPI:  42 yr old female with PMHx of PulmHTN class I/VI (dx 2014) on continuous Treprostinil (Remodulin) (sildenafil - currently on hold) c/b Rt heart failure s/p PPM (Dual chamber 2016), Chronic P.E. on rivaroxaban (dx 2017), SVT s/p ablation (5/2020), Asthma (chronic steroid use - prednisone), Fe deficiency anemia who originally admitted to medicine floor  8/28/22 with c/o of palpitations accompanied by orthopnea/N/V/RUQ abd pain over a 2 day period.  This short hospital course c/b hypotension with SBP 80/41 accompanied by chest pain, transaminitis, EDOUARD with peak sCr 3.05(now resolved baseline sCr of .90-1.24) requiring transfer to MICU evening 8/28/22 for hypotension in setting of acute on chronic cor pulmonale/pulmHTN with end organ dysfunction (EDOUARD, transaminitis). Pt placed on dobutamine/diuretic/Marcela therapies. Course further c/b TTE (8/31/22) finding of new echogenic mass in RA, CTA chest (8/31/22) re-demonstrated severe dilatation of Pulm arteries, without new P.E., received interventional cardiology for partial thrombectomy - see note          Interval Events:      REVIEW OF SYSTEMS:          OBJECTIVE:  ICU Vital Signs Last 24 Hrs  T(C): 36.3 (01 Sep 2022 04:00), Max: 36.9 (31 Aug 2022 12:00)  T(F): 97.4 (01 Sep 2022 04:00), Max: 98.5 (31 Aug 2022 12:00)  HR: 85 (01 Sep 2022 05:00) (81 - 107)  BP: 103/79 (31 Aug 2022 19:30) (103/79 - 115/68)  BP(mean): 87 (31 Aug 2022 19:30) (87 - 87)  ABP: 104/57 (01 Sep 2022 04:00) (99/52 - 123/77)  ABP(mean): 75 (01 Sep 2022 04:00) (68 - 98)  RR: 15 (01 Sep 2022 05:00) (12 - 25)  SpO2: 100% (01 Sep 2022 05:00) (90% - 100%)    O2 Parameters below as of 01 Sep 2022 04:00  Patient On (Oxygen Delivery Method): nasal cannula  O2 Flow (L/min): 4            08-30 @ 07:01  -  08-31 @ 07:00  --------------------------------------------------------  IN: 967.2 mL / OUT: 1750 mL / NET: -782.8 mL    08-31 @ 07:01  - 09-01 @ 05:24  --------------------------------------------------------  IN: 1326.8 mL / OUT: 650 mL / NET: 676.8 mL      CAPILLARY BLOOD GLUCOSE              PHYSICAL EXAM:          HOSPITAL MEDICATIONS:  MEDICATIONS  (STANDING):  chlorhexidine 4% Liquid 1 Application(s) Topical <User Schedule>  cyanocobalamin 1000 MICROGram(s) Oral daily  DOBUTamine Infusion 2.5 MICROgram(s)/kG/Min (7.82 mL/Hr) IV Continuous <Continuous>  furosemide    Tablet 40 milliGRAM(s) Oral daily  heparin  Infusion 1000 Unit(s)/Hr (11 mL/Hr) IV Continuous <Continuous>  ipratropium    for Nebulization 500 MICROGram(s) Nebulizer every 6 hours  mupirocin 2% Nasal 1 Application(s) Both Nostrils two times a day  pantoprazole    Tablet 40 milliGRAM(s) Oral before breakfast  polyethylene glycol 3350 17 Gram(s) Oral daily  predniSONE   Tablet 10 milliGRAM(s) Oral daily  Remodulin (Treprostinil) 5mG/mL 1 Vial(s) 1 Vial(s) SubCutaneous Continuous Pump  senna 2 Tablet(s) Oral at bedtime  spironolactone 25 milliGRAM(s) Oral daily    MEDICATIONS  (PRN):  aluminum hydroxide/magnesium hydroxide/simethicone Suspension 30 milliLiter(s) Oral every 4 hours PRN Dyspepsia  artificial  tears Solution 1 Drop(s) Both EYES three times a day PRN Dry Eyes  melatonin 3 milliGRAM(s) Oral at bedtime PRN Insomnia  ondansetron Injectable 4 milliGRAM(s) IV Push every 8 hours PRN Nausea and/or Vomiting  sodium chloride 0.9% lock flush 10 milliLiter(s) IV Push every 1 hour PRN Pre/post blood products, medications, blood draw, and to maintain line patency      LABS:                        13.0   12.71 )-----------( 297      ( 01 Sep 2022 01:16 )             41.3     Hgb Trend: 13.0<--, 14.5<--, 14.2<--, 14.0<--, 15.2<--  09-01    130<L>  |  96  |  14  ----------------------------<  149<H>  3.4<L>   |  21<L>  |  0.92    Ca    8.4      01 Sep 2022 01:16  Phos  3.1     09-01  Mg     1.8     09-01    TPro  6.6  /  Alb  3.8  /  TBili  1.0  /  DBili  x   /  AST  86<H>  /  ALT  630<H>  /  AlkPhos  67  09-01    LIVER FUNCTIONS - ( 01 Sep 2022 01:16 )  Alb: 3.8 g/dL / Pro: 6.6 g/dL / ALK PHOS: 67 U/L / ALT: 630 U/L / AST: 86 U/L / GGT: x           Creatinine Trend: 0.92<--, 1.07<--, 1.19<--, 1.62<--, 2.09<--, 2.32<--  PT/INR - ( 01 Sep 2022 01:16 )   PT: 16.0 sec;   INR: 1.37 ratio         PTT - ( 01 Sep 2022 01:16 )  PTT:52.3 sec    Arterial Blood Gas:  09-01 @ 00:40  7.38/40/162/24/99.4/-1.3  ABG lactate: --  Arterial Blood Gas:  08-31 @ 12:13  7.44/39/108/26/--/2.3  ABG lactate: --  Arterial Blood Gas:  08-30 @ 23:56  7.45/36/103/25/99.4/1.3  ABG lactate: --  Arterial Blood Gas:  08-30 @ 14:00  7.41/38/78/24/96.1/-0.3  ABG lactate: --  Arterial Blood Gas:  08-30 @ 05:29  7.41/29/125/18/99.3/-4.9  ABG lactate: --        MICROBIOLOGY:     RADIOLOGY:  [ ] Reviewed and interpreted by me    EKG:      [facility] (ext [id number]) CHIEF COMPLAINT:  Patient is a 42y old  Female who presents with a chief complaint of Palpitations (31 Aug 2022 13:36)    HPI:  42 yr old female with PMHx of PulmHTN class I/VI (dx 2014) on continuous Treprostinil (Remodulin) (sildenafil - currently on hold) c/b Rt heart failure s/p PPM (Dual chamber 2016), Chronic P.E. on rivaroxaban (dx 2017), SVT s/p ablation (5/2020), Asthma (chronic steroid use - prednisone), Fe deficiency anemia who originally admitted to medicine floor  8/28/22 with c/o of palpitations accompanied by orthopnea/N/V/RUQ abd pain over a 2 day period.  This short hospital course c/b hypotension with SBP 80/41 accompanied by chest pain, transaminitis, EDOUARD with peak sCr 3.05(now resolved baseline sCr of .90-1.24) requiring transfer to MICU evening 8/28/22 for hypotension in setting of acute on chronic cor pulmonale/pulmHTN with end organ dysfunction (EDOUARD, transaminitis). Pt placed on dobutamine/diuretic/Marcela therapies. Course further c/b TTE (8/31/22) finding of new echogenic mass in RA, CTA chest (8/31/22) re-demonstrated severe dilatation of Pulm arteries, without new P.E., received catheter - directed thrombus aspiration (see note)          Interval Events:      REVIEW OF SYSTEMS:          OBJECTIVE:  ICU Vital Signs Last 24 Hrs  T(C): 36.3 (01 Sep 2022 04:00), Max: 36.9 (31 Aug 2022 12:00)  T(F): 97.4 (01 Sep 2022 04:00), Max: 98.5 (31 Aug 2022 12:00)  HR: 85 (01 Sep 2022 05:00) (81 - 107)  BP: 103/79 (31 Aug 2022 19:30) (103/79 - 115/68)  BP(mean): 87 (31 Aug 2022 19:30) (87 - 87)  ABP: 104/57 (01 Sep 2022 04:00) (99/52 - 123/77)  ABP(mean): 75 (01 Sep 2022 04:00) (68 - 98)  RR: 15 (01 Sep 2022 05:00) (12 - 25)  SpO2: 100% (01 Sep 2022 05:00) (90% - 100%)    O2 Parameters below as of 01 Sep 2022 04:00  Patient On (Oxygen Delivery Method): nasal cannula  O2 Flow (L/min): 4            08-30 @ 07:01  -  08-31 @ 07:00  --------------------------------------------------------  IN: 967.2 mL / OUT: 1750 mL / NET: -782.8 mL    08-31 @ 07:01  - 09-01 @ 05:24  --------------------------------------------------------  IN: 1326.8 mL / OUT: 650 mL / NET: 676.8 mL      CAPILLARY BLOOD GLUCOSE              PHYSICAL EXAM:          HOSPITAL MEDICATIONS:  MEDICATIONS  (STANDING):  chlorhexidine 4% Liquid 1 Application(s) Topical <User Schedule>  cyanocobalamin 1000 MICROGram(s) Oral daily  DOBUTamine Infusion 2.5 MICROgram(s)/kG/Min (7.82 mL/Hr) IV Continuous <Continuous>  furosemide    Tablet 40 milliGRAM(s) Oral daily  heparin  Infusion 1000 Unit(s)/Hr (11 mL/Hr) IV Continuous <Continuous>  ipratropium    for Nebulization 500 MICROGram(s) Nebulizer every 6 hours  mupirocin 2% Nasal 1 Application(s) Both Nostrils two times a day  pantoprazole    Tablet 40 milliGRAM(s) Oral before breakfast  polyethylene glycol 3350 17 Gram(s) Oral daily  predniSONE   Tablet 10 milliGRAM(s) Oral daily  Remodulin (Treprostinil) 5mG/mL 1 Vial(s) 1 Vial(s) SubCutaneous Continuous Pump  senna 2 Tablet(s) Oral at bedtime  spironolactone 25 milliGRAM(s) Oral daily    MEDICATIONS  (PRN):  aluminum hydroxide/magnesium hydroxide/simethicone Suspension 30 milliLiter(s) Oral every 4 hours PRN Dyspepsia  artificial  tears Solution 1 Drop(s) Both EYES three times a day PRN Dry Eyes  melatonin 3 milliGRAM(s) Oral at bedtime PRN Insomnia  ondansetron Injectable 4 milliGRAM(s) IV Push every 8 hours PRN Nausea and/or Vomiting  sodium chloride 0.9% lock flush 10 milliLiter(s) IV Push every 1 hour PRN Pre/post blood products, medications, blood draw, and to maintain line patency      LABS:                        13.0   12.71 )-----------( 297      ( 01 Sep 2022 01:16 )             41.3     Hgb Trend: 13.0<--, 14.5<--, 14.2<--, 14.0<--, 15.2<--  09-01    130<L>  |  96  |  14  ----------------------------<  149<H>  3.4<L>   |  21<L>  |  0.92    Ca    8.4      01 Sep 2022 01:16  Phos  3.1     09-01  Mg     1.8     09-01    TPro  6.6  /  Alb  3.8  /  TBili  1.0  /  DBili  x   /  AST  86<H>  /  ALT  630<H>  /  AlkPhos  67  09-01    LIVER FUNCTIONS - ( 01 Sep 2022 01:16 )  Alb: 3.8 g/dL / Pro: 6.6 g/dL / ALK PHOS: 67 U/L / ALT: 630 U/L / AST: 86 U/L / GGT: x           Creatinine Trend: 0.92<--, 1.07<--, 1.19<--, 1.62<--, 2.09<--, 2.32<--  PT/INR - ( 01 Sep 2022 01:16 )   PT: 16.0 sec;   INR: 1.37 ratio         PTT - ( 01 Sep 2022 01:16 )  PTT:52.3 sec    Arterial Blood Gas:  09-01 @ 00:40  7.38/40/162/24/99.4/-1.3  ABG lactate: --  Arterial Blood Gas:  08-31 @ 12:13  7.44/39/108/26/--/2.3  ABG lactate: --  Arterial Blood Gas:  08-30 @ 23:56  7.45/36/103/25/99.4/1.3  ABG lactate: --  Arterial Blood Gas:  08-30 @ 14:00  7.41/38/78/24/96.1/-0.3  ABG lactate: --  Arterial Blood Gas:  08-30 @ 05:29  7.41/29/125/18/99.3/-4.9  ABG lactate: --        MICROBIOLOGY:     RADIOLOGY:  [ ] Reviewed and interpreted by me    EKG:      [facility] (ext [id number]) CHIEF COMPLAINT:  Patient is a 42y old  Female who presents with a chief complaint of Palpitations (31 Aug 2022 13:36)    HPI:  42 yr old female with PMHx of PulmHTN class I/VI (dx 2014) on continuous Treprostinil (Remodulin) (sildenafil - currently on hold) c/b Rt heart failure s/p PPM (Dual chamber 2016), Chronic P.E. on rivaroxaban (dx 2017), SVT s/p ablation (5/2020), Asthma (chronic steroid use - prednisone), Fe deficiency anemia who originally admitted to medicine floor  8/28/22 with c/o of palpitations accompanied by orthopnea/N/V/RUQ abd pain over a 2 day period.  This short hospital course c/b hypotension with SBP 80/41 accompanied by chest pain, transaminitis, EDOUARD with peak sCr 3.05(now resolved baseline sCr of .90-1.24) requiring transfer to MICU evening 8/28/22 for hypotension in setting of acute on chronic cor pulmonale/pulmHTN with end organ dysfunction (EDOUARD, transaminitis). Pt placed on dobutamine/diuretic/Marcela therapies. Course further c/b TTE (8/31/22) finding of new echogenic mass in RA, CTA chest (8/31/22) re-demonstrated severe dilatation of Pulm arteries, without new P.E., received catheter - directed thrombus aspiration (see note)      Interval Events:      REVIEW OF SYSTEMS:  CONSTITUTIONAL:           No weakness, fevers or chills, H/A  EYES/ENT:           No visual changes;  No vertigo or throat pain   NECK:            No pain or stiffness  RESPIRATORY:            occasional productive cough, wheezing, without hemoptysis; without shortness of breath  CARDIOVASCULAR:            No chest pain or palpitations  GASTROINTESTINAL:           No abdominal or epigastric pain. No nausea, vomiting, or hematemesis; No diarrhea or constipation. No melena or hematochezia.  GENITOURINARY:            No dysuria, frequency or hematuria  NEUROLOGICAL:            No numbness or weakness  SKIN:            No pruritis , rashes      OBJECTIVE:  ICU Vital Signs Last 24 Hrs  T(C): 36.3 (01 Sep 2022 04:00), Max: 36.9 (31 Aug 2022 12:00)  T(F): 97.4 (01 Sep 2022 04:00), Max: 98.5 (31 Aug 2022 12:00)  HR: 85 (01 Sep 2022 05:00) (81 - 107)  BP: 103/79 (31 Aug 2022 19:30) (103/79 - 115/68)  BP(mean): 87 (31 Aug 2022 19:30) (87 - 87)  ABP: 104/57 (01 Sep 2022 04:00) (99/52 - 123/77)  ABP(mean): 75 (01 Sep 2022 04:00) (68 - 98)  RR: 15 (01 Sep 2022 05:00) (12 - 25)  SpO2: 100% (01 Sep 2022 05:00) (90% - 100%)    O2 Parameters below as of 01 Sep 2022 04:00  Patient On (Oxygen Delivery Method): nasal cannula  O2 Flow (L/min): 4            08-30 @ 07:01  -  08-31 @ 07:00  --------------------------------------------------------  IN: 967.2 mL / OUT: 1750 mL / NET: -782.8 mL    08-31 @ 07:01  -  09-01 @ 05:24  --------------------------------------------------------  IN: 1326.8 mL / OUT: 650 mL / NET: 676.8 mL      CAPILLARY BLOOD GLUCOSE      PHYSICAL EXAM:  GENERAL:   NAD, well-groomed, obese    HEAD:    Atraumatic, Normocephalic    EYES:   EOMI, PERRLA 3 mm, conjunctiva and sclera clear    ENMT:   No oropharyngeal exudates, erythema or lesions,  Moist mucous membranes    NECK:   Supple, no cervical lymphadenopathy, no JVD    NERVOUS SYSTEM:  Alert & Oriented X3, CN II-XII intact, has spontaneous purposeful movement of all extremities; Upper extremities  4/5; Lower extremities 4/5, full sensation to light touch     CHEST/LUNG:   Utilizing 3 liters N/C with 10 PPM of Marcela   .Breath sounds bilaterally without  crackles, rhonchi, end inspiratory/expiratory wheezing in RLL field, without or rubs. POCUS     HEART:   cardiac monitor sinus tach without ectopy   ; S1/S2 I/VI sys murmur, without rubs, or gallops, POCUS     ABDOMEN:   Soft, Nontender, Nondistended; Bowel sounds present, Bladder non distended, non palpable    EXTREMITIES:   Pulses palpable radial pulses 2+ bilat, DP/PT 1+/1+ bilat, without clubbing, cyanosis. Digits warm to touch with good cap refill < 3 secs. Rt Radial A-line site without redness, infiltration, swelling, tenderness, pain, dressing dry and intact. Lt fem TLC site without redness, infiltration, swelling, tenderness, pain, dressing dry and intact     SKIN:   warm, dry, intact, normal color, no rash or abnormal lesions, without palpable nodes          HOSPITAL MEDICATIONS:  MEDICATIONS  (STANDING):  chlorhexidine 4% Liquid 1 Application(s) Topical <User Schedule>  cyanocobalamin 1000 MICROGram(s) Oral daily  DOBUTamine Infusion 2.5 MICROgram(s)/kG/Min (7.82 mL/Hr) IV Continuous <Continuous>  furosemide    Tablet 40 milliGRAM(s) Oral daily  heparin  Infusion 1000 Unit(s)/Hr (11 mL/Hr) IV Continuous <Continuous>  ipratropium    for Nebulization 500 MICROGram(s) Nebulizer every 6 hours  mupirocin 2% Nasal 1 Application(s) Both Nostrils two times a day  pantoprazole    Tablet 40 milliGRAM(s) Oral before breakfast  polyethylene glycol 3350 17 Gram(s) Oral daily  predniSONE   Tablet 10 milliGRAM(s) Oral daily  Remodulin (Treprostinil) 5mG/mL 1 Vial(s) 1 Vial(s) SubCutaneous Continuous Pump  senna 2 Tablet(s) Oral at bedtime  spironolactone 25 milliGRAM(s) Oral daily    MEDICATIONS  (PRN):  aluminum hydroxide/magnesium hydroxide/simethicone Suspension 30 milliLiter(s) Oral every 4 hours PRN Dyspepsia  artificial  tears Solution 1 Drop(s) Both EYES three times a day PRN Dry Eyes  melatonin 3 milliGRAM(s) Oral at bedtime PRN Insomnia  ondansetron Injectable 4 milliGRAM(s) IV Push every 8 hours PRN Nausea and/or Vomiting  sodium chloride 0.9% lock flush 10 milliLiter(s) IV Push every 1 hour PRN Pre/post blood products, medications, blood draw, and to maintain line patency      LABS:                        13.0   12.71 )-----------( 297      ( 01 Sep 2022 01:16 )             41.3     Hgb Trend: 13.0<--, 14.5<--, 14.2<--, 14.0<--, 15.2<--  09-01    130<L>  |  96  |  14  ----------------------------<  149<H>  3.4<L>   |  21<L>  |  0.92    Ca    8.4      01 Sep 2022 01:16  Phos  3.1     09-01  Mg     1.8     09-01    TPro  6.6  /  Alb  3.8  /  TBili  1.0  /  DBili  x   /  AST  86<H>  /  ALT  630<H>  /  AlkPhos  67  09-01    LIVER FUNCTIONS - ( 01 Sep 2022 01:16 )  Alb: 3.8 g/dL / Pro: 6.6 g/dL / ALK PHOS: 67 U/L / ALT: 630 U/L / AST: 86 U/L / GGT: x           Creatinine Trend: 0.92<--, 1.07<--, 1.19<--, 1.62<--, 2.09<--, 2.32<--  PT/INR - ( 01 Sep 2022 01:16 )   PT: 16.0 sec;   INR: 1.37 ratio         PTT - ( 01 Sep 2022 01:16 )  PTT:52.3 sec    Arterial Blood Gas:  09-01 @ 00:40  7.38/40/162/24/99.4/-1.3  ABG lactate: --  Arterial Blood Gas:  08-31 @ 12:13  7.44/39/108/26/--/2.3  ABG lactate: --  Arterial Blood Gas:  08-30 @ 23:56  7.45/36/103/25/99.4/1.3  ABG lactate: --  Arterial Blood Gas:  08-30 @ 14:00  7.41/38/78/24/96.1/-0.3  ABG lactate: --  Arterial Blood Gas:  08-30 @ 05:29  7.41/29/125/18/99.3/-4.9  ABG lactate: --        MICROBIOLOGY:     RADIOLOGY:  [ ] Reviewed and interpreted by me    EKG:      [facility] (ext [id number]) CHIEF COMPLAINT:  Patient is a 42y old  Female who presents with a chief complaint of Palpitations (31 Aug 2022 13:36)    HPI:  42 yr old female with PMHx of PulmHTN class I/VI (dx 2014) on continuous Treprostinil (Remodulin) (sildenafil - currently on hold) c/b Rt heart failure s/p PPM (Dual chamber 2016), Chronic P.E. on rivaroxaban (dx 2017), SVT s/p ablation (5/2020), Asthma (chronic steroid use - prednisone), Fe deficiency anemia who originally admitted to medicine floor  8/28/22 with c/o of palpitations accompanied by orthopnea/N/V/RUQ abd pain over a 2 day period.  This short hospital course c/b hypotension with SBP 80/41 accompanied by chest pain, transaminitis, EDOUARD with peak sCr 3.05(now resolved baseline sCr of .90-1.24) requiring transfer to MICU evening 8/28/22 for hypotension in setting of acute on chronic cor pulmonale/pulmHTN with end organ dysfunction (EDOUARD, transaminitis). Pt placed on dobutamine/diuretic/Marcela therapies. Course further c/b TTE (8/31/22) finding of new echogenic mass in RA, CTA chest (8/31/22) re-demonstrated severe dilatation of Pulm arteries, without new P.E., received catheter - directed thrombus aspiration (see note)      Interval Events:      REVIEW OF SYSTEMS:  CONSTITUTIONAL:           No weakness, fevers or chills, H/A  EYES/ENT:           No visual changes;  No vertigo or throat pain   NECK:            No pain or stiffness  RESPIRATORY:            occasional productive cough, wheezing, without hemoptysis; without shortness of breath  CARDIOVASCULAR:            No chest pain or palpitations  GASTROINTESTINAL:           No abdominal or epigastric pain. No nausea, vomiting, or hematemesis; No diarrhea or constipation. No melena or hematochezia.  GENITOURINARY:            No dysuria, frequency or hematuria  NEUROLOGICAL:            No numbness or weakness  SKIN:            No pruritis , rashes      OBJECTIVE:  ICU Vital Signs Last 24 Hrs  T(C): 36.3 (01 Sep 2022 04:00), Max: 36.9 (31 Aug 2022 12:00)  T(F): 97.4 (01 Sep 2022 04:00), Max: 98.5 (31 Aug 2022 12:00)  HR: 85 (01 Sep 2022 05:00) (81 - 107)  BP: 103/79 (31 Aug 2022 19:30) (103/79 - 115/68)  BP(mean): 87 (31 Aug 2022 19:30) (87 - 87)  ABP: 104/57 (01 Sep 2022 04:00) (99/52 - 123/77)  ABP(mean): 75 (01 Sep 2022 04:00) (68 - 98)  RR: 15 (01 Sep 2022 05:00) (12 - 25)  SpO2: 100% (01 Sep 2022 05:00) (90% - 100%)    O2 Parameters below as of 01 Sep 2022 04:00  Patient On (Oxygen Delivery Method): nasal cannula  O2 Flow (L/min): 4            08-30 @ 07:01  -  08-31 @ 07:00  --------------------------------------------------------  IN: 967.2 mL / OUT: 1750 mL / NET: -782.8 mL    08-31 @ 07:01  -  09-01 @ 05:24  --------------------------------------------------------  IN: 1326.8 mL / OUT: 650 mL / NET: 676.8 mL      CAPILLARY BLOOD GLUCOSE      PHYSICAL EXAM:  GENERAL:   NAD, well-groomed, obese    HEAD:    Atraumatic, Normocephalic    EYES:   EOMI, PERRLA 3 mm, conjunctiva and sclera clear    ENMT:   No oropharyngeal exudates, erythema or lesions,  Moist mucous membranes    NECK:   Supple, no cervical lymphadenopathy, no JVD    NERVOUS SYSTEM:  Alert & Oriented X3, CN II-XII intact, has spontaneous purposeful movement of all extremities; Upper extremities  5/5; Lower extremities 4/5, full sensation to light touch     CHEST/LUNG:   Utilizing 3 liters N/C with 10 PPM of Marcela   .Breath sounds bilaterally without  crackles, rhonchi, end inspiratory/expiratory wheezing in RLL field, end expiratory wheezes in Lt LLL field, without or rubs. POCUS: Good LVFx, RV=/slight >LV, septal flattening with slight "D"ing  appreciated. Poor RVFx, VTI 16, IVC 1.2    HEART:   cardiac monitor sinus tach without ectopy   ; S1/S2 I/VI sys murmur, without rubs, or gallops, POCUS:  A line predominant, few focal B lines in lower lung fields, curtain sign appreciated      ABDOMEN:   Soft, Nontender, Nondistended; Bowel sounds present, Bladder non distended, non palpable    EXTREMITIES:   Pulses palpable radial pulses 2+ bilat, DP/PT 1+/1+ bilat, without clubbing, cyanosis. Digits warm to touch with good cap refill < 3 secs. Rt Radial A-line site without redness, infiltration, swelling, tenderness, pain, dressing dry and intact. Lt fem TLC site without redness, infiltration, swelling, tenderness, pain, dressing dry and intact     SKIN:   warm, dry, intact, normal color, no rash or abnormal lesions, without palpable nodes          HOSPITAL MEDICATIONS:  MEDICATIONS  (STANDING):  chlorhexidine 4% Liquid 1 Application(s) Topical <User Schedule>  cyanocobalamin 1000 MICROGram(s) Oral daily  DOBUTamine Infusion 2.5 MICROgram(s)/kG/Min (7.82 mL/Hr) IV Continuous <Continuous>  furosemide    Tablet 40 milliGRAM(s) Oral daily  heparin  Infusion 1000 Unit(s)/Hr (11 mL/Hr) IV Continuous <Continuous>  ipratropium    for Nebulization 500 MICROGram(s) Nebulizer every 6 hours  mupirocin 2% Nasal 1 Application(s) Both Nostrils two times a day  pantoprazole    Tablet 40 milliGRAM(s) Oral before breakfast  polyethylene glycol 3350 17 Gram(s) Oral daily  predniSONE   Tablet 10 milliGRAM(s) Oral daily  Remodulin (Treprostinil) 5mG/mL 1 Vial(s) 1 Vial(s) SubCutaneous Continuous Pump  senna 2 Tablet(s) Oral at bedtime  spironolactone 25 milliGRAM(s) Oral daily    MEDICATIONS  (PRN):  aluminum hydroxide/magnesium hydroxide/simethicone Suspension 30 milliLiter(s) Oral every 4 hours PRN Dyspepsia  artificial  tears Solution 1 Drop(s) Both EYES three times a day PRN Dry Eyes  melatonin 3 milliGRAM(s) Oral at bedtime PRN Insomnia  ondansetron Injectable 4 milliGRAM(s) IV Push every 8 hours PRN Nausea and/or Vomiting  sodium chloride 0.9% lock flush 10 milliLiter(s) IV Push every 1 hour PRN Pre/post blood products, medications, blood draw, and to maintain line patency      LABS:                        13.0   12.71 )-----------( 297      ( 01 Sep 2022 01:16 )             41.3     Hgb Trend: 13.0<--, 14.5<--, 14.2<--, 14.0<--, 15.2<--  09-01    130<L>  |  96  |  14  ----------------------------<  149<H>  3.4<L>   |  21<L>  |  0.92    Ca    8.4      01 Sep 2022 01:16  Phos  3.1     09-01  Mg     1.8     09-01    TPro  6.6  /  Alb  3.8  /  TBili  1.0  /  DBili  x   /  AST  86<H>  /  ALT  630<H>  /  AlkPhos  67  09-01    LIVER FUNCTIONS - ( 01 Sep 2022 01:16 )  Alb: 3.8 g/dL / Pro: 6.6 g/dL / ALK PHOS: 67 U/L / ALT: 630 U/L / AST: 86 U/L / GGT: x           Creatinine Trend: 0.92<--, 1.07<--, 1.19<--, 1.62<--, 2.09<--, 2.32<--  PT/INR - ( 01 Sep 2022 01:16 )   PT: 16.0 sec;   INR: 1.37 ratio         PTT - ( 01 Sep 2022 01:16 )  PTT:52.3 sec    Arterial Blood Gas:  09-01 @ 00:40  7.38/40/162/24/99.4/-1.3  ABG lactate: --  Arterial Blood Gas:  08-31 @ 12:13  7.44/39/108/26/--/2.3  ABG lactate: --  Arterial Blood Gas:  08-30 @ 23:56  7.45/36/103/25/99.4/1.3  ABG lactate: --  Arterial Blood Gas:  08-30 @ 14:00  7.41/38/78/24/96.1/-0.3  ABG lactate: --  Arterial Blood Gas:  08-30 @ 05:29  7.41/29/125/18/99.3/-4.9  ABG lactate: --        MICROBIOLOGY:     RADIOLOGY:  [ ] Reviewed and interpreted by me    EKG:      [facility] (ext [id number])

## 2022-09-01 NOTE — PROGRESS NOTE ADULT - SUBJECTIVE AND OBJECTIVE BOX
Interval History:  Patient went for RA thrombus aspiration, feeling well this morning     Medications:  aluminum hydroxide/magnesium hydroxide/simethicone Suspension 30 milliLiter(s) Oral every 4 hours PRN  artificial  tears Solution 1 Drop(s) Both EYES three times a day PRN  chlorhexidine 4% Liquid 1 Application(s) Topical <User Schedule>  cyanocobalamin 1000 MICROGram(s) Oral daily  DOBUTamine Infusion 2.5 MICROgram(s)/kG/Min IV Continuous <Continuous>  heparin  Infusion 1000 Unit(s)/Hr IV Continuous <Continuous>  ipratropium    for Nebulization 500 MICROGram(s) Nebulizer every 6 hours  melatonin 3 milliGRAM(s) Oral at bedtime PRN  mupirocin 2% Nasal 1 Application(s) Both Nostrils two times a day  ondansetron Injectable 4 milliGRAM(s) IV Push every 8 hours PRN  pantoprazole    Tablet 40 milliGRAM(s) Oral before breakfast  polyethylene glycol 3350 17 Gram(s) Oral daily  predniSONE   Tablet 10 milliGRAM(s) Oral daily  Remodulin (Treprostinil) 5mG/mL 1 Vial(s) 1 Vial(s) SubCutaneous Continuous Pump  senna 2 Tablet(s) Oral at bedtime  sodium chloride 0.9% lock flush 10 milliLiter(s) IV Push every 1 hour PRN  spironolactone 25 milliGRAM(s) Oral daily      Vitals:  T(C): 36.6 (09-01-22 @ 08:00), Max: 36.9 (08-31-22 @ 16:00)  HR: 90 (09-01-22 @ 12:10) (81 - 110)  BP: 90/65 (09-01-22 @ 12:00) (90/65 - 115/68)  BP(mean): 74 (09-01-22 @ 12:00) (74 - 94)  RR: 16 (09-01-22 @ 12:08) (12 - 24)  SpO2: 98% (09-01-22 @ 12:10) (95% - 100%)    Daily     Daily         I&O's Summary    31 Aug 2022 07:01  -  01 Sep 2022 07:00  --------------------------------------------------------  IN: 1483.2 mL / OUT: 1050 mL / NET: 433.2 mL    01 Sep 2022 07:01  -  01 Sep 2022 12:23  --------------------------------------------------------  IN: 497 mL / OUT: 1000 mL / NET: -503 mL        Physical Exam:  Appearance: No Acute Distress  HEENT: PERRL  Neck: JVD improved   Cardiovascular: RRR  Respiratory: b/l crackles   Gastrointestinal: Soft, Non-tender	  Skin: No cyanosis	  Neurologic: Non-focal  Extremities: edema improved   Psychiatry: A & O x 3, Mood & affect appropriate    Labs:                        13.0   12.71 )-----------( 297      ( 01 Sep 2022 01:16 )             41.3     09-01    130<L>  |  96  |  14  ----------------------------<  149<H>  3.4<L>   |  21<L>  |  0.92    Ca    8.4      01 Sep 2022 01:16  Phos  3.1     09-01  Mg     1.8     09-01    TPro  6.6  /  Alb  3.8  /  TBili  1.0  /  DBili  x   /  AST  86<H>  /  ALT  630<H>  /  AlkPhos  67  09-01    PT/INR - ( 01 Sep 2022 08:15 )   PT: 15.5 sec;   INR: 1.33 ratio         PTT - ( 01 Sep 2022 08:15 )  PTT:57.2 sec

## 2022-09-01 NOTE — CHART NOTE - NSCHARTNOTEFT_GEN_A_CORE
: Girish Velasquez    INDICATION: Assessment     PROCEDURE:  [xxxx ] LIMITED ECHO  [xxxx ] LIMITED CHEST  [ ] LIMITED RETROPERITONEAL  [ ] LIMITED ABDOMINAL  [ ] LIMITED DVT  [ ] NEEDLE GUIDANCE VASCULAR  [ ] NEEDLE GUIDANCE THORACENTESIS  [ ] NEEDLE GUIDANCE PARACENTESIS  [ ] NEEDLE GUIDANCE PERICARDIOCENTESIS  [ ] OTHER    FINDINGS:  A line predominant, few focal B lines in lower lung fields, curtain sign appreciated    Good LVFx, RV=/slight >LV, poor RVFx, VTI 16, IVC 1.2    INTERPRETATION:  A line predominant    Good LVFx, RV=/slight >LV in setting of pulmHTN and resolving acute on chronic cor pulmonale : Girish Velasquez    INDICATION: Assessment     PROCEDURE:  [xxxx ] LIMITED ECHO  [xxxx ] LIMITED CHEST  [ ] LIMITED RETROPERITONEAL  [ ] LIMITED ABDOMINAL  [ ] LIMITED DVT  [ ] NEEDLE GUIDANCE VASCULAR  [ ] NEEDLE GUIDANCE THORACENTESIS  [ ] NEEDLE GUIDANCE PARACENTESIS  [ ] NEEDLE GUIDANCE PERICARDIOCENTESIS  [ ] OTHER    FINDINGS:  A line predominant, few focal B lines in lower lung fields, curtain sign appreciated    Good LVFx, RV=/slight >LV, septal flattening with slight "D"ing in parasternal short appreciated poor RVFx, VTI 16, IVC 1.2    INTERPRETATION:  A line predominant    Good LVFx, RV=/slight >LV in setting of pulmHTN and resolving acute on chronic cor pulmonale : Girish Velasquez    INDICATION: acute hypoxemic respiratory failure     PROCEDURE:  [xxxx ] LIMITED ECHO  [xxxx ] LIMITED CHEST  [ ] LIMITED RETROPERITONEAL  [ ] LIMITED ABDOMINAL  [ ] LIMITED DVT  [ ] NEEDLE GUIDANCE VASCULAR  [ ] NEEDLE GUIDANCE THORACENTESIS  [ ] NEEDLE GUIDANCE PARACENTESIS  [ ] NEEDLE GUIDANCE PERICARDIOCENTESIS  [ ] OTHER    FINDINGS:  A line predominant, few focal B lines in lower lung fields, curtain sign appreciated    LVSF grossly normal, less hyperdynamic than prior, RV>LV size, interventricular septal flattening, IVC 1.2 cm in limited views    INTERPRETATION:  A line predominant  RV > LV size but LV less hyperdynamic than prior  IVC 1.2 cm in limited views  Images stored in [facility].    Attending Attestation:  I was present during the key portions of the procedure and immediately available during the entire procedure.  Chely Randall MD  Attending  Pulmonary & Critical Care Medicine

## 2022-09-01 NOTE — PROGRESS NOTE ADULT - PROBLEM SELECTOR PROBLEM 3
BONE MARROW BIOPSY/ASPIRATION DISCHARGE INSTRUCTIONS  A bone marrow aspiration is a procedure that takes out a small amount of bone marrow fluid through a needle. A bone marrow biopsy uses a needle to take out a small amount of bone with the marrow inside it. These samples are then checked under a microscope. The hip bone is the most commonly used area for these procedures. Home Care Instructions: You may resume your regular diet and medication regimen. Do not drink alcohol, drive, or make any important legal decisions in the next 24 hours. Do not lift anything heavier than a gallon of milk until the soreness goes away. You may use over the counter acetaminophen or ibuprofen for the soreness. You may apply an ice pack to the affected area for 20-30 minutes at time for the first 24 hours. After that, you may apply a heat pack. You will have a bandaid over the area where the doctor put the needle. Keep this area clean and dry for the next 24 hours. Change the bandaid daily, or if it becomes wet, until the area has healed. Call if you have any bleeding other than a small spot on your bandaid. Call if you have any signs or symptoms of infection: fever, redness, or drainage from the puncture site or fever. Should you experience any significant changes, please call [phone] between the hours of 7:30 am and 10 pm or [phone] after hours.  After hours, ask the  to page the [facility], and describe the problem to the technologist. Pulmonary hypertension

## 2022-09-01 NOTE — PROGRESS NOTE ADULT - PROBLEM SELECTOR PLAN 1
Elevated BNP, +LE edema,  BNP is 14K with evidence of end organ damage now improving   Transferred to MICU   - Wean  after nitro   - K>4, Mg >2  - Monitor I/O, daily weights   - stop lasix, c/w spironolactone Elevated BNP, +LE edema,  BNP is 14K with evidence of end organ damage now improving   Transferred to MICU   - Wean  after nitro   - K>4, Mg >2  - Monitor I/O, daily weights   - stop lasix, c/w spironolactone  - Repeat lactate

## 2022-09-01 NOTE — CONSULT NOTE ADULT - SUBJECTIVE AND OBJECTIVE BOX
VASCULAR CARDIOLOGY FELLOW CONSULT NOTE    SERVICE LINE: [phone]    HPI:    41 yo lady PMHx of PE on xarelto, WHO group 1 and 4 pulmonary HTN, ILD, JULIA, and obesity who was admitted for stage D, NYHA class IV heart failure w/ severe pulmonary HTN now in MICU on Marcela, dobutamine, remodulin, and lasix. Found to have new echogenic mass in the RA in TTE measuring 1.8 cm x 3.1 cm likely representing clot in transit, s/p catheter-directed thrombus aspiration on 08/31.     PMHx/PSHx: PE on xarelto, WHO group 1 and 4 pulmonary HTN, ILD, JULIA, and obesity    FMHx/SHx: none-pertinent    Current Medications:   aluminum hydroxide/magnesium hydroxide/simethicone Suspension 30 milliLiter(s) Oral every 4 hours PRN  artificial  tears Solution 1 Drop(s) Both EYES three times a day PRN  chlorhexidine 4% Liquid 1 Application(s) Topical <User Schedule>  cyanocobalamin 1000 MICROGram(s) Oral daily  DOBUTamine Infusion 2.5 MICROgram(s)/kG/Min IV Continuous <Continuous>  heparin  Infusion 1000 Unit(s)/Hr IV Continuous <Continuous>  melatonin 3 milliGRAM(s) Oral at bedtime PRN  mupirocin 2% Nasal 1 Application(s) Both Nostrils two times a day  ondansetron Injectable 4 milliGRAM(s) IV Push every 8 hours PRN  pantoprazole    Tablet 40 milliGRAM(s) Oral before breakfast  polyethylene glycol 3350 17 Gram(s) Oral daily  predniSONE   Tablet 10 milliGRAM(s) Oral daily  Remodulin (Treprostinil) 5mG/mL 1 Vial(s) 1 Vial(s) SubCutaneous Continuous Pump  senna 2 Tablet(s) Oral at bedtime  sodium chloride 0.9% lock flush 10 milliLiter(s) IV Push every 1 hour PRN  spironolactone 25 milliGRAM(s) Oral daily      REVIEW OF SYSTEMS:  CONSTITUTIONAL: No weakness, fevers or chills  EYES/ENT: No visual changes;  No dysphagia  NECK: No pain or stiffness  RESPIRATORY: No cough, wheezing, hemoptysis; No shortness of breath  CARDIOVASCULAR: No chest pain or palpitations; No lower extremity edema  GASTROINTESTINAL: No abdominal or epigastric pain. No nausea, vomiting, or hematemesis; No diarrhea or constipation. No melena or hematochezia.  BACK: No back pain  GENITOURINARY: No dysuria, frequency or hematuria  NEUROLOGICAL: No numbness or weakness  SKIN: No itching, burning, rashes, or lesions   All other review of systems is negative unless indicated above.    Physical Exam:  T(F): 97.9 (09-01), Max: 98.5 (08-31)  HR: 98 (09-01) (81 - 110)  BP: 96/70 (09-01) (96/70 - 115/68)  BP(mean): 80 (09-01) (80 - 94)  ABP: 80/68 (09-01) (80/68 - 123/77)  ABP(mean): 74 (09-01) (65 - 98)  RR: 20 (09-01)  SpO2: 100% (09-01)  GENERAL: No acute distress, well-developed  HEAD:  Atraumatic, Normocephalic  ENT: EOMI, PERRLA, conjunctiva and sclera clear, Neck supple, No JVD, moist mucosa  CHEST/LUNG: Clear to auscultation bilaterally; No wheeze, equal breath sounds bilaterally   BACK: No spinal tenderness  HEART: Regular rate and rhythm; No murmurs, rubs, or gallops  ABDOMEN: Soft, Nontender, Nondistended; Bowel sounds present  EXTREMITIES:  No clubbing, cyanosis, or edema  PSYCH: Nl behavior, nl affect  NEUROLOGY: AAOx3, non-focal, cranial nerves intact  SKIN: Normal color, No rashes or lesions. B/l femoral access site 2+ pulse w/o hematoma or bleeding, with tenderness R>L.     Cardiovascular Diagnostic Testing: personally reviewed    CXR: Personally reviewed    Labs: Personally reviewed                        13.0   12.71 )-----------( 297      ( 01 Sep 2022 01:16 )             41.3     09-01    130<L>  |  96  |  14  ----------------------------<  149<H>  3.4<L>   |  21<L>  |  0.92    Ca    8.4      01 Sep 2022 01:16  Phos  3.1     09-01  Mg     1.8     09-01    TPro  6.6  /  Alb  3.8  /  TBili  1.0  /  DBili  x   /  AST  86<H>  /  ALT  630<H>  /  AlkPhos  67  09-01    PT/INR - ( 01 Sep 2022 08:15 )   PT: 15.5 sec;   INR: 1.33 ratio         PTT - ( 01 Sep 2022 08:15 )  PTT:57.2 sec    CARDIAC MARKERS ( 28 Aug 2022 19:33 )  23 ng/L / x     / x     / 82 U/L / x     / 2.5 ng/mL  CARDIAC MARKERS ( 28 Aug 2022 11:39 )  15 ng/L / x     / x     / x     / x     / x            Serum Pro-Brain Natriuretic Peptide: 31796 pg/mL (08-28 @ 19:33)  Serum Pro-Brain Natriuretic Peptide: 7946 pg/mL (08-28 @ 11:39)        Thyroid Stimulating Hormone, Serum: 2.94 uIU/mL (08-29 @ 14:06)

## 2022-09-01 NOTE — PROGRESS NOTE ADULT - ASSESSMENT
42 yr old female with stated hx significant for PulmHTN class I/VI on Treprostinil therapy, Chronic PE who originally presented with palpitations accompanied by orthopnea/N/V/RUQ abd pain over a 2 day period. Course c/b hypotension secondary to acute on chronic cor pulmonale with end organ dysfx (EDOUARD, transaminitis).    Plan:    #Neuro:  =no active issues  -Neuro checks q 2 hrs and prn for changes  -activity as tolerated  -physical therapy consult when stable  -dilauded 0.5 mg IV prn for pain 7-10/10    #Pulm:  =Asthma, Cor pulmonale, PulmHTN (class I/VI)  -Supplemental O2 prn to maintain SPO2 > 92% (currently 3 liters N/C)  -Ipratropium q 6 hrs prn for sob/wheezes  -HOB >/= 30 degree angle  -consider decreasing Marcela to 5 ppm as tolerated  -continue with subq Treprostinil therapy with eventual plan for IV  -consider obtaining I.R. consult for tunnel catheter placement   -home med of sildenafil 10mg po qd - currently on hold due to HYPOtn episode. As BP improves will restart    #CV:  =resolving acute on chronic cor pulmonale due to pulmHTN, echogenic mass in RA (found to be organized old clot)  -resolved HYPOtn  -dobutamine gtt - currently on 2.5 mcg/kg/min -consider d/c   -TTE 8/31/22 - hyperdynamic LVSF, new echogenic mass in RA, RVE, severe PulmHTN  -CTA chest 8/31/22  re-demonstrated severe dilatation of Pulm arteries, without new P.E.  -s/p catheter directed thrombus aspiration 8/31/22 (see vascular cardiology note)  -Treprostinil therapy as above  -home med of rivaroxaban 10 mg po qd - currently on hold   -Heparin gtt started 8/31/22 - titrate to PTT of 60 to 99    #GI/:  =resolving RUQ pain, improving transaminitis (most likely due to congestive hepatopathy), resolving EDOUARD (most likely pre-renal)  -received RUQ U.S. 8/30/22 - CBD 7 mm, mild hepatic steatosis  -strict I & O's - keep negative  -lasix 40 mg IVP x 1 given today 8/31/22  -lasix  20 mg po qd therapy re-started    #ID:  =resolving sepsis  -Blood Culture 8/29/22 - NGTD  -MRSA/MSSA 8/29/22 - Detected  -Blood Cultrues 8/29/22 - Staph Lugdunensis  -RVP 8/28/22 - ND  -SARS-CoV-2 8/28/22 - ND   -continue Mupirocin 2% therapy q 12 hrs x5 days (started 8/30/22)  -will complete aztreonam therapy - will d/c 9/1/22    #FEN/ENDO/HEME:  =improving transaminitis, resolving elevate INR  -obtain CMP/Mg++/PO--4/CBC w diff/PT/PTT/INR now and q. a.m.  -Obtain methemoglobin level (Marcela)  -trend LFT's       42 yr old female with stated hx significant for PulmHTN class I/VI on Treprostinil therapy, Chronic PE who originally presented with palpitations accompanied by orthopnea/N/V/RUQ abd pain over a 2 day period. Course c/b hypotension secondary to acute on chronic cor pulmonale with end organ dysfx (EDOUARD, transaminitis).    Plan:    #Neuro:  =no active issues  -Neuro checks q 2 hrs and prn for changes  -activity as tolerated  -physical therapy consult when stable  -dilauded 0.5 mg IV prn for pain 7-10/10    #Pulm:  =Asthma, Cor pulmonale, PulmHTN (class I/VI)  -Supplemental O2 prn to maintain SPO2 > 92% (currently 3 liters N/C)  -Ipratropium q 6 hrs prn for sob/wheezes  -HOB >/= 30 degree angle  -consider decreasing Marcela to 5 ppm as tolerated  -continue with subq Treprostinil therapy with eventual plan for IV  -consider obtaining I.R. consult for tunnel catheter placement   -home med of sildenafil 10mg po qd - currently on hold due to HYPOtn episode. As BP improves will restart    #CV:  =resolving acute on chronic cor pulmonale due to pulmHTN, echogenic mass in RA (found to be organized old clot)  -resolved HYPOtn  -dobutamine gtt - currently on 2.5 mcg/kg/min -consider d/c   -TTE 8/31/22 - hyperdynamic LVSF, new echogenic mass in RA, RVE, severe PulmHTN  -CTA chest 8/31/22  re-demonstrated severe dilatation of Pulm arteries, without new P.E.  -s/p catheter directed thrombus aspiration 8/31/22 (see vascular cardiology note)  -Treprostinil therapy as above  -home med of rivaroxaban 10 mg po qd - currently on hold   -Heparin gtt started 8/31/22 - titrate to PTT of 60 to 99    #GI/:  =resolving RUQ pain, improving transaminitis (most likely due to congestive hepatopathy), resolving EDOUARD (most likely pre-renal)  -received RUQ U.S. 8/30/22 - CBD 7 mm, mild hepatic steatosis  -strict I & O's - keep negative  -lasix  20 mg po qd therapy re-started    #ID:  =resolving sepsis  -Sputum Cx 8/31/22 - mod PMN, rare squamous epi cells, mod gm+ cocci in pairs, few gm- rods  -Blood Culture 8/31/22 - NGTD  -Blood Culture 8/29/22 - NGTD  -MRSA/MSSA 8/29/22 - Detected  -Blood Cultrues 8/29/22 - Staph Lugdunensis  -RVP 8/28/22 - ND  -SARS-CoV-2 8/28/22 - ND   -continue Mupirocin 2% therapy q 12 hrs x5 days (started 8/30/22)  -s/p aztreonam therapy - (8/29/22-9/1/22)    #FEN/ENDO/HEME:  =improving transaminitis, resolving elevate INR  -obtain CMP/Mg++/PO--4/CBC w diff/PT/PTT/INR now and q. a.m.  -Obtain methemoglobin level (Marcela)  -trend LFT's       42 yr old female with stated hx significant for PulmHTN class I/VI on Treprostinil therapy, Chronic PE who originally presented with palpitations accompanied by orthopnea/N/V/RUQ abd pain over a 2 day period. Course c/b hypotension secondary to acute on chronic cor pulmonale with end organ dysfx (EDOUARD, transaminitis).    Plan:    #Neuro:  =no active issues  -Neuro checks q 2 hrs and prn for changes  -activity as tolerated  -physical therapy consult when stable  -dilauded 0.5 mg IV prn for pain 7-10/10    #Pulm:  =Asthma, Cor pulmonale, PulmHTN (class I/VI)  -Supplemental O2 prn to maintain SPO2 > 92% (currently 3 liters N/C)  -Ipratropium q 6 hrs prn for sob/wheezes  -HOB >/= 30 degree angle  -consider decreasing Marcela to 5 ppm as tolerated  -continue with subq Treprostinil therapy with eventual plan for IV  -consider obtaining I.R. consult for tunnel catheter placement   -home med of sildenafil 10mg po qd - currently on hold due to HYPOtn episode. As BP improves will restart    #CV:  =resolving acute on chronic cor pulmonale due to pulmHTN, echogenic mass in RA (found to be organized old clot)  -resolved HYPOtn  -dobutamine gtt - currently on 2.5 mcg/kg/min -consider d/c   -TTE 8/31/22 - hyperdynamic LVSF, new echogenic mass in RA, RVE, severe PulmHTN  -CTA chest 8/31/22  re-demonstrated severe dilatation of Pulm arteries, without new P.E.  -s/p catheter directed thrombus aspiration 8/31/22 (see vascular cardiology note)  -Treprostinil therapy as above  -home med of rivaroxaban 10 mg po qd - currently on hold   -Heparin gtt started 8/31/22 - titrate to PTT of 60 to 99    #GI/:  =resolving RUQ pain, improving transaminitis (most likely due to congestive hepatopathy), resolving EDOUARD (most likely pre-renal)  -received RUQ U.S. 8/30/22 - CBD 7 mm, mild hepatic steatosis  -strict I & O's - keep negative  -lasix  40 mg po qd therapy re-started    #ID:  =resolving sepsis  -Sputum Cx 8/31/22 - mod PMN, rare squamous epi cells, mod gm+ cocci in pairs, few gm- rods  -Blood Culture 8/31/22 - NGTD  -Blood Culture 8/29/22 - NGTD  -MRSA/MSSA 8/29/22 - Detected  -Blood Cultrues 8/29/22 - Staph Lugdunensis  -RVP 8/28/22 - ND  -SARS-CoV-2 8/28/22 - ND   -continue Mupirocin 2% therapy q 12 hrs x5 days (started 8/30/22)  -s/p aztreonam therapy - (8/29/22-9/1/22)    #FEN/ENDO/HEME:  =improving transaminitis, resolving elevate INR  -obtain CMP/Mg++/PO--4/CBC w diff/PT/PTT/INR now and q. a.m.  -Obtain methemoglobin level (Marcela)  -trend LFT's

## 2022-09-01 NOTE — PROGRESS NOTE ADULT - ASSESSMENT
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 The patient came in with worsening nausea, vomiting, dyspnea. She came to ED and she was found to be hypotensive with signs of end organ damage. Repeat labs from today show shock liver and EDOUARD. She has signs of Right sided heart failure on exam, BNP 56774. 8/31 on repeat echo was found to have clot in transit which was removed.

## 2022-09-01 NOTE — CONSULT NOTE ADULT - ATTENDING COMMENTS
No acute events reported overnight.  The patient is complaining of mild discomfort in her right and left groin sites.  Heparin drip has been reinitiated.  Denies any chest pain/tightness/discomfort, fevers, chills, sweats, light sensation, dizziness or palpitations.    The patient on 8/31/2022 was brought to the cardiac catheterization laboratory.  She underwent partial aspiration of thrombus in right atrium.  The clot upon inspection appeared to be chronic in nature.  Under TTE and fluoroscopy it appeared to be adhered to the right ventricular pacemaker lead.    Would recommend continuation of anticoagulation therapy.    Mattress suture will be removed later today.  Please keep leg straight for minimum of 2 hours following removal of mattress suture.    Venous duplex study on 9/1/2022 demonstrated no evidence of deep venous thrombosis in either lower extremity in the visualized venous segments.      CTA on 8/31/2022 demonstrated no acute pulmonary embolism.  Severe dilatation of the pulmonary arteries representing pulmonary hypertension.  Bilateral groundglass opacities likely manifestation of pulm hypertension are unchanged.    All questions and concerns of the patient were addressed.    Findings and plan were discussed with heart failure/Dr. Chavis, MICU team and pulmonary hypertension specialist /Dr. Yoon.    All questions and concerns of the patient and her  were addressed.    Time-based billing (NON-critical care).     35 minutes spent on total encounter; more than 50% of the visit was spent counseling and / or coordinating care by the attending physician.  The necessity of the time spent during the encounter on this date of service was due to:     education, assessment and coordination of care.

## 2022-09-01 NOTE — PROGRESS NOTE ADULT - ATTENDING COMMENTS
Patient attended Phase 2 Cardiac Rehab Exercise Session. Further documentation will be scanned into the medical record upon discharge. 42 F with Group I/?IV pulm htn on Remodulin pump, PE on xarelto here with sob and palpitations, found to have acute on chronic cor pulmonale and acute on chronic hypoxic respiratory failure with shock, concern for clot in transit yesterday with attempted removal but stopped due to chronic appearance    continues to improve overall, headache slightly better    - c/w dobutamine for now at 2.5; will wean Marcela to 5 ppm and see how she tolerates  - EDOUARD improving, given po diuretics today, having abdominal pain which she says is when she feels fluid overloaded, so will aim to keep net negative throughout the day   - c/w remodulin pump at 42 ng/kg/min; will try to transition to IV remodulin once pharmacy has it available  - suspect contaminant as repeat bcx negative, will try to remove central line today once other PIV available  - c/w heparin gtt for PEs    Critically ill patient requiring frequent bedside visits with therapy changes.

## 2022-09-01 NOTE — CHART NOTE - NSCHARTNOTEFT_GEN_A_CORE
Left fem TLC d/c via aseptic technique. Catheter removed in entirety. Direct pressure maintained x 11 minutes. Dry sterile pressure dressing applied. Site without redness, infiltration, drainage, swelling, tenderness of pain. Pt tolerated procedure well

## 2022-09-01 NOTE — PROGRESS NOTE ADULT - PROBLEM SELECTOR PLAN 3
on inhaled nitro and continued on remodulin, Patient's pulmonologist is aware. Reassessment for lung transplant? Plan to decrease nitro then .

## 2022-09-02 DIAGNOSIS — I72.9 ANEURYSM OF UNSPECIFIED SITE: ICD-10-CM

## 2022-09-02 LAB
-  AMPICILLIN/SULBACTAM: SIGNIFICANT CHANGE UP
-  CEFAZOLIN: SIGNIFICANT CHANGE UP
-  CLINDAMYCIN: SIGNIFICANT CHANGE UP
-  ERYTHROMYCIN: SIGNIFICANT CHANGE UP
-  GENTAMICIN: SIGNIFICANT CHANGE UP
-  OXACILLIN: SIGNIFICANT CHANGE UP
-  PENICILLIN: SIGNIFICANT CHANGE UP
-  RIFAMPIN: SIGNIFICANT CHANGE UP
-  TETRACYCLINE: SIGNIFICANT CHANGE UP
-  TRIMETHOPRIM/SULFAMETHOXAZOLE: SIGNIFICANT CHANGE UP
-  VANCOMYCIN: SIGNIFICANT CHANGE UP
ALBUMIN SERPL ELPH-MCNC: 3.3 G/DL — SIGNIFICANT CHANGE UP (ref 3.3–5)
ALBUMIN SERPL ELPH-MCNC: 3.3 G/DL — SIGNIFICANT CHANGE UP (ref 3.3–5)
ALP SERPL-CCNC: 60 U/L — SIGNIFICANT CHANGE UP (ref 40–120)
ALP SERPL-CCNC: 61 U/L — SIGNIFICANT CHANGE UP (ref 40–120)
ALT FLD-CCNC: 369 U/L — HIGH (ref 10–45)
ALT FLD-CCNC: 443 U/L — HIGH (ref 10–45)
ANION GAP SERPL CALC-SCNC: 10 MMOL/L — SIGNIFICANT CHANGE UP (ref 5–17)
ANION GAP SERPL CALC-SCNC: 12 MMOL/L — SIGNIFICANT CHANGE UP (ref 5–17)
APTT BLD: 51.1 SEC — HIGH (ref 27.5–35.5)
APTT BLD: 54.6 SEC — HIGH (ref 27.5–35.5)
AST SERPL-CCNC: 54 U/L — HIGH (ref 10–40)
AST SERPL-CCNC: 55 U/L — HIGH (ref 10–40)
BASE EXCESS BLDA CALC-SCNC: 6.6 MMOL/L — HIGH (ref -2–3)
BASE EXCESS BLDV CALC-SCNC: 5.1 MMOL/L — HIGH (ref -2–3)
BASE EXCESS BLDV CALC-SCNC: 5.8 MMOL/L — HIGH (ref -2–3)
BILIRUB SERPL-MCNC: 0.8 MG/DL — SIGNIFICANT CHANGE UP (ref 0.2–1.2)
BILIRUB SERPL-MCNC: 0.8 MG/DL — SIGNIFICANT CHANGE UP (ref 0.2–1.2)
BUN SERPL-MCNC: 12 MG/DL — SIGNIFICANT CHANGE UP (ref 7–23)
BUN SERPL-MCNC: 9 MG/DL — SIGNIFICANT CHANGE UP (ref 7–23)
CA-I SERPL-SCNC: 1.13 MMOL/L — LOW (ref 1.15–1.33)
CA-I SERPL-SCNC: 1.17 MMOL/L — SIGNIFICANT CHANGE UP (ref 1.15–1.33)
CALCIUM SERPL-MCNC: 8.1 MG/DL — LOW (ref 8.4–10.5)
CALCIUM SERPL-MCNC: 8.8 MG/DL — SIGNIFICANT CHANGE UP (ref 8.4–10.5)
CHLORIDE BLDV-SCNC: 95 MMOL/L — LOW (ref 96–108)
CHLORIDE BLDV-SCNC: 97 MMOL/L — SIGNIFICANT CHANGE UP (ref 96–108)
CHLORIDE SERPL-SCNC: 97 MMOL/L — SIGNIFICANT CHANGE UP (ref 96–108)
CHLORIDE SERPL-SCNC: 97 MMOL/L — SIGNIFICANT CHANGE UP (ref 96–108)
CO2 BLDA-SCNC: 33 MMOL/L — HIGH (ref 19–24)
CO2 BLDV-SCNC: 31 MMOL/L — HIGH (ref 22–26)
CO2 BLDV-SCNC: 31 MMOL/L — HIGH (ref 22–26)
CO2 SERPL-SCNC: 25 MMOL/L — SIGNIFICANT CHANGE UP (ref 22–31)
CO2 SERPL-SCNC: 25 MMOL/L — SIGNIFICANT CHANGE UP (ref 22–31)
COHGB MFR BLDA: 1.8 % — SIGNIFICANT CHANGE UP
CREAT SERPL-MCNC: 0.78 MG/DL — SIGNIFICANT CHANGE UP (ref 0.5–1.3)
CREAT SERPL-MCNC: 0.9 MG/DL — SIGNIFICANT CHANGE UP (ref 0.5–1.3)
CULTURE RESULTS: SIGNIFICANT CHANGE UP
EGFR: 82 ML/MIN/1.73M2 — SIGNIFICANT CHANGE UP
EGFR: 97 ML/MIN/1.73M2 — SIGNIFICANT CHANGE UP
GAS PNL BLDA: SIGNIFICANT CHANGE UP
GAS PNL BLDV: 128 MMOL/L — LOW (ref 136–145)
GAS PNL BLDV: 130 MMOL/L — LOW (ref 136–145)
GAS PNL BLDV: SIGNIFICANT CHANGE UP
GLUCOSE BLDV-MCNC: 103 MG/DL — HIGH (ref 70–99)
GLUCOSE BLDV-MCNC: 135 MG/DL — HIGH (ref 70–99)
GLUCOSE SERPL-MCNC: 102 MG/DL — HIGH (ref 70–99)
GLUCOSE SERPL-MCNC: 123 MG/DL — HIGH (ref 70–99)
HCO3 BLDA-SCNC: 31 MMOL/L — HIGH (ref 21–28)
HCO3 BLDV-SCNC: 30 MMOL/L — HIGH (ref 22–29)
HCO3 BLDV-SCNC: 30 MMOL/L — HIGH (ref 22–29)
HCT VFR BLD CALC: 37.2 % — SIGNIFICANT CHANGE UP (ref 34.5–45)
HCT VFR BLD CALC: 37.6 % — SIGNIFICANT CHANGE UP (ref 34.5–45)
HCT VFR BLDA CALC: 36 % — SIGNIFICANT CHANGE UP (ref 34.5–46.5)
HCT VFR BLDA CALC: 36 % — SIGNIFICANT CHANGE UP (ref 34.5–46.5)
HGB BLD CALC-MCNC: 11.9 G/DL — SIGNIFICANT CHANGE UP (ref 11.7–16.1)
HGB BLD CALC-MCNC: 12.1 G/DL — SIGNIFICANT CHANGE UP (ref 11.7–16.1)
HGB BLD-MCNC: 11.8 G/DL — SIGNIFICANT CHANGE UP (ref 11.5–15.5)
HGB BLD-MCNC: 11.9 G/DL — SIGNIFICANT CHANGE UP (ref 11.5–15.5)
HGB BLDA-MCNC: 12.1 G/DL — SIGNIFICANT CHANGE UP (ref 11.7–16.1)
HOROWITZ INDEX BLDA+IHG-RTO: 28 — SIGNIFICANT CHANGE UP
INR BLD: 1.21 RATIO — HIGH (ref 0.88–1.16)
LACTATE BLDV-MCNC: 0.7 MMOL/L — SIGNIFICANT CHANGE UP (ref 0.5–2)
LACTATE BLDV-MCNC: 1.3 MMOL/L — SIGNIFICANT CHANGE UP (ref 0.5–2)
MAGNESIUM SERPL-MCNC: 1.8 MG/DL — SIGNIFICANT CHANGE UP (ref 1.6–2.6)
MAGNESIUM SERPL-MCNC: 1.9 MG/DL — SIGNIFICANT CHANGE UP (ref 1.6–2.6)
MCHC RBC-ENTMCNC: 22.5 PG — LOW (ref 27–34)
MCHC RBC-ENTMCNC: 22.7 PG — LOW (ref 27–34)
MCHC RBC-ENTMCNC: 31.6 GM/DL — LOW (ref 32–36)
MCHC RBC-ENTMCNC: 31.7 GM/DL — LOW (ref 32–36)
MCV RBC AUTO: 70.9 FL — LOW (ref 80–100)
MCV RBC AUTO: 71.7 FL — LOW (ref 80–100)
METHGB MFR BLDA: 0.4 % — SIGNIFICANT CHANGE UP
METHOD TYPE: SIGNIFICANT CHANGE UP
NRBC # BLD: 0 /100 WBCS — SIGNIFICANT CHANGE UP (ref 0–0)
NRBC # BLD: 0 /100 WBCS — SIGNIFICANT CHANGE UP (ref 0–0)
NT-PROBNP SERPL-SCNC: 71 PG/ML — SIGNIFICANT CHANGE UP (ref 0–300)
O2 CT VFR BLDA CALC: 16.4 ML/DL — LOW (ref 18–22)
OXYHGB MFR BLDA: 95.7 % — HIGH (ref 90–95)
PCO2 BLDA: 44 MMHG — SIGNIFICANT CHANGE UP (ref 32–45)
PCO2 BLDV: 40 MMHG — SIGNIFICANT CHANGE UP (ref 39–42)
PCO2 BLDV: 44 MMHG — HIGH (ref 39–42)
PH BLDA: 7.46 — HIGH (ref 7.35–7.45)
PH BLDV: 7.44 — HIGH (ref 7.32–7.43)
PH BLDV: 7.48 — HIGH (ref 7.32–7.43)
PHOSPHATE SERPL-MCNC: 2.9 MG/DL — SIGNIFICANT CHANGE UP (ref 2.5–4.5)
PHOSPHATE SERPL-MCNC: 3 MG/DL — SIGNIFICANT CHANGE UP (ref 2.5–4.5)
PLATELET # BLD AUTO: 222 K/UL — SIGNIFICANT CHANGE UP (ref 150–400)
PLATELET # BLD AUTO: 238 K/UL — SIGNIFICANT CHANGE UP (ref 150–400)
PO2 BLDA: 84 MMHG — SIGNIFICANT CHANGE UP (ref 83–108)
PO2 BLDV: 101 MMHG — HIGH (ref 25–45)
PO2 BLDV: 41 MMHG — SIGNIFICANT CHANGE UP (ref 25–45)
POTASSIUM BLDV-SCNC: 4.6 MMOL/L — SIGNIFICANT CHANGE UP (ref 3.5–5.1)
POTASSIUM BLDV-SCNC: 4.6 MMOL/L — SIGNIFICANT CHANGE UP (ref 3.5–5.1)
POTASSIUM SERPL-MCNC: 4 MMOL/L — SIGNIFICANT CHANGE UP (ref 3.5–5.3)
POTASSIUM SERPL-MCNC: 4.9 MMOL/L — SIGNIFICANT CHANGE UP (ref 3.5–5.3)
POTASSIUM SERPL-SCNC: 4 MMOL/L — SIGNIFICANT CHANGE UP (ref 3.5–5.3)
POTASSIUM SERPL-SCNC: 4.9 MMOL/L — SIGNIFICANT CHANGE UP (ref 3.5–5.3)
PROT SERPL-MCNC: 6.6 G/DL — SIGNIFICANT CHANGE UP (ref 6–8.3)
PROT SERPL-MCNC: 6.7 G/DL — SIGNIFICANT CHANGE UP (ref 6–8.3)
PROTHROM AB SERPL-ACNC: 14.1 SEC — HIGH (ref 10.5–13.4)
RBC # BLD: 5.19 M/UL — SIGNIFICANT CHANGE UP (ref 3.8–5.2)
RBC # BLD: 5.3 M/UL — HIGH (ref 3.8–5.2)
RBC # FLD: 17.1 % — HIGH (ref 10.3–14.5)
RBC # FLD: 17.1 % — HIGH (ref 10.3–14.5)
SAO2 % BLDA: 97.9 % — SIGNIFICANT CHANGE UP (ref 94–98)
SAO2 % BLDV: 66.8 % — LOW (ref 67–88)
SAO2 % BLDV: 98.9 % — HIGH (ref 67–88)
SODIUM SERPL-SCNC: 132 MMOL/L — LOW (ref 135–145)
SODIUM SERPL-SCNC: 134 MMOL/L — LOW (ref 135–145)
SPECIMEN SOURCE: SIGNIFICANT CHANGE UP
WBC # BLD: 11.63 K/UL — HIGH (ref 3.8–10.5)
WBC # BLD: 12.05 K/UL — HIGH (ref 3.8–10.5)
WBC # FLD AUTO: 11.63 K/UL — HIGH (ref 3.8–10.5)
WBC # FLD AUTO: 12.05 K/UL — HIGH (ref 3.8–10.5)

## 2022-09-02 PROCEDURE — 71045 X-RAY EXAM CHEST 1 VIEW: CPT | Mod: 26

## 2022-09-02 PROCEDURE — 99221 1ST HOSP IP/OBS SF/LOW 40: CPT

## 2022-09-02 PROCEDURE — 36556 INSERT NON-TUNNEL CV CATH: CPT | Mod: GC

## 2022-09-02 PROCEDURE — 36620 INSERTION CATHETER ARTERY: CPT | Mod: GC

## 2022-09-02 PROCEDURE — 93926 LOWER EXTREMITY STUDY: CPT | Mod: 26,LT

## 2022-09-02 PROCEDURE — 99291 CRITICAL CARE FIRST HOUR: CPT | Mod: GC

## 2022-09-02 PROCEDURE — 99291 CRITICAL CARE FIRST HOUR: CPT | Mod: GC,25

## 2022-09-02 RX ORDER — ACETAMINOPHEN 500 MG
650 TABLET ORAL ONCE
Refills: 0 | Status: COMPLETED | OUTPATIENT
Start: 2022-09-02 | End: 2022-09-02

## 2022-09-02 RX ORDER — CALCIUM GLUCONATE 100 MG/ML
2 VIAL (ML) INTRAVENOUS ONCE
Refills: 0 | Status: COMPLETED | OUTPATIENT
Start: 2022-09-02 | End: 2022-09-02

## 2022-09-02 RX ORDER — HYDROMORPHONE HYDROCHLORIDE 2 MG/ML
0.5 INJECTION INTRAMUSCULAR; INTRAVENOUS; SUBCUTANEOUS EVERY 4 HOURS
Refills: 0 | Status: DISCONTINUED | OUTPATIENT
Start: 2022-09-02 | End: 2022-09-08

## 2022-09-02 RX ORDER — SODIUM CHLORIDE 0.65 %
1 AEROSOL, SPRAY (ML) NASAL EVERY 6 HOURS
Refills: 0 | Status: DISCONTINUED | OUTPATIENT
Start: 2022-09-02 | End: 2022-09-14

## 2022-09-02 RX ADMIN — Medication 10 MILLIGRAM(S): at 05:01

## 2022-09-02 RX ADMIN — SPIRONOLACTONE 25 MILLIGRAM(S): 25 TABLET, FILM COATED ORAL at 05:02

## 2022-09-02 RX ADMIN — Medication 500 MICROGRAM(S): at 05:23

## 2022-09-02 RX ADMIN — HYDROMORPHONE HYDROCHLORIDE 0.5 MILLIGRAM(S): 2 INJECTION INTRAMUSCULAR; INTRAVENOUS; SUBCUTANEOUS at 22:05

## 2022-09-02 RX ADMIN — CHLORHEXIDINE GLUCONATE 1 APPLICATION(S): 213 SOLUTION TOPICAL at 05:02

## 2022-09-02 RX ADMIN — PREGABALIN 1000 MICROGRAM(S): 225 CAPSULE ORAL at 12:06

## 2022-09-02 RX ADMIN — Medication 10 MILLIGRAM(S): at 13:19

## 2022-09-02 RX ADMIN — MUPIROCIN 1 APPLICATION(S): 20 OINTMENT TOPICAL at 17:37

## 2022-09-02 RX ADMIN — PANTOPRAZOLE SODIUM 40 MILLIGRAM(S): 20 TABLET, DELAYED RELEASE ORAL at 05:01

## 2022-09-02 RX ADMIN — Medication 650 MILLIGRAM(S): at 16:57

## 2022-09-02 RX ADMIN — Medication 200 GRAM(S): at 01:18

## 2022-09-02 RX ADMIN — MUPIROCIN 1 APPLICATION(S): 20 OINTMENT TOPICAL at 05:21

## 2022-09-02 RX ADMIN — HEPARIN SODIUM 13 UNIT(S)/HR: 5000 INJECTION INTRAVENOUS; SUBCUTANEOUS at 01:18

## 2022-09-02 RX ADMIN — Medication 7.82 MICROGRAM(S)/KG/MIN: at 19:45

## 2022-09-02 RX ADMIN — Medication 650 MILLIGRAM(S): at 16:27

## 2022-09-02 RX ADMIN — Medication 1 SPRAY(S): at 06:06

## 2022-09-02 RX ADMIN — HYDROMORPHONE HYDROCHLORIDE 0.5 MILLIGRAM(S): 2 INJECTION INTRAMUSCULAR; INTRAVENOUS; SUBCUTANEOUS at 21:51

## 2022-09-02 NOTE — PHYSICAL THERAPY INITIAL EVALUATION ADULT - ADDITIONAL COMMENTS
Pt. lives in apt. with spouse and son.5 steps to elevator. no steps inside. No HHA services. Patient ambulated without AD independent.  pt owns no DMEs. Pt. lives in apt. with spouse and son.5 steps to elevator. no steps inside. No HHA services. Patient ambulated without AD independent.  pt owns RW at home.

## 2022-09-02 NOTE — PHYSICAL THERAPY INITIAL EVALUATION ADULT - PERTINENT HX OF CURRENT PROBLEM, REHAB EVAL
42 yr old female with PMHx of PulmHTN class I/VI (dx 2014) on continuous Treprostinil (Remodulin) (sildenafil - currently on hold) c/b Rt heart failure s/p PPM (Dual chamber 2016), Chronic P.E. on rivaroxaban (dx 2017), SVT s/p ablation (5/2020), Asthma (chronic steroid use - prednisone), Fe deficiency anemia who originally admitted to medicine floor  8/28/22 with c/o of palpitations accompanied by orthopnea/N/V/RUQ abd pain over a 2 day period.  This short hospital course c/b hypotension with SBP 80/41 accompanied by chest pain, transaminitis, EDOUARD with peak sCr 3.05 (now resolved, baseline sCr of .90-1.24) requiring transfer to MICU evening 8/28/22 for hypotension in setting of acute on chronic cor pulmonale/pulmHTN with end organ dysfunction (EDOUARD, transaminitis). Course further c/b TTE (8/31/22) finding of new echogenic mass in RA concerning for clot in transit, CTA chest (8/31/22) re-demonstrated severe dilatation of Pulm arteries, without new P.E., Patient with a  history of PE and is now on xarelto. received catheter - directed thrombus aspiration 8/31.  per HF note- "will need to evaluate if patient candidate for transplant but unlikely d/t BMI"

## 2022-09-02 NOTE — PROGRESS NOTE ADULT - ASSESSMENT
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 The patient came in with worsening nausea, vomiting, dyspnea. She came to ED and she was found to be hypotensive with signs of end organ damage. Repeat labs from today show shock liver and EDOUARD. She has signs of Right sided heart failure on exam, BNP 51743. 8/31 on repeat echo was found to have clot in transit which was removed. Course c/b L femoral pseudoaneurysm

## 2022-09-02 NOTE — PROGRESS NOTE ADULT - ASSESSMENT
42 yr old female with stated hx significant for PulmHTN class I/VI on Treprostinil therapy, Chronic PE who originally presented with palpitations accompanied by orthopnea/N/V/RUQ abd pain over a 2 day period. Course c/b hypotension secondary to acute on chronic cor pulmonale with end organ dysfx (EDOUARD, transaminitis).    Plan:    #Neuro:  =no active issues  -Neuro checks q 2 hrs and prn for changes  -activity as tolerated  -physical therapy consult when stable  -dilauded 0.5 mg IV prn for pain 7-10/10    #Pulm:  =Asthma, Cor pulmonale, PulmHTN (class I/VI)  -Supplemental O2 prn to maintain SPO2 > 92% (currently 3 liters N/C)  -Ipratropium q 6 hrs and prn for sob/wheezes  -HOB >/= 30 degree angle  -Marcela to 5 ppm as tolerated  -continue with subq Treprostinil therapy with eventual plan for IV  -consider obtaining I.R. consult for tunnel catheter placement   -as SBP improved and stable consider re institution of sildenafil 10mg po qd    #CV:  =resolving acute on chronic cor pulmonale due to pulmHTN, echogenic mass in RA (found to be organized old clot)  -resolved HYPOtn  -dobutamine gtt - currently on 2.5 mcg/kg/min -consider d/c   -TTE 8/31/22 - hyperdynamic LVSF, new echogenic mass in RA, RVE concerning for clot in transit, severe PulmHTN  -CTA chest 8/31/22  re-demonstrated severe dilatation of Pulm arteries, without new P.E.  -s/p catheter directed thrombus aspiration 8/31/22 (see vascular cardiology note)  -Treprostinil therapy as above  -home med of rivaroxaban 10 mg po qd - currently on hold   -Heparin gtt started 8/31/22 - titrate to PTT of 60 to 99    #GI/:  =resolving RUQ pain, improving transaminitis (most likely due to congestive hepatopathy), resolving EDOUARD (most likely pre-renal)  -received RUQ U.S. 8/30/22 - CBD 7 mm, mild hepatic steatosis  -strict I & O's - keep negative  -lasix  40 mg po qd therapy re-started    #ID:  =resolving sepsis  -Sputum Cx 8/31/22 - mod PMN, rare squamous epi cells, mod gm+ cocci in pairs, few gm- rods  -Blood Culture 8/31/22 - NGTD  -Blood Culture 8/29/22 - NGTD  -MRSA/MSSA 8/29/22 - Detected  -Blood Cultrues 8/29/22 - Staph Lugdunensis  -RVP 8/28/22 - ND  -SARS-CoV-2 8/28/22 - ND   -continue Mupirocin 2% therapy q 12 hrs x5 days (started 8/30/22)  -s/p aztreonam therapy - (8/29/22-9/1/22)    #FEN/ENDO/HEME:  =improving transaminitis, resolving elevate INR  -obtain CMP/Mg++/PO--4/CBC w diff/PT/PTT/INR now and q. a.m.  -Obtain methemoglobin level (Marcela)  -trend LFT's   42 yr old female with stated hx significant for PulmHTN class I/VI on Treprostinil therapy, Chronic PE who originally presented with palpitations accompanied by orthopnea/N/V/RUQ abd pain over a 2 day period. Course c/b hypotension secondary to acute on chronic cor pulmonale with end organ dysfx (EDOUARD, transaminitis).    Plan:    #Neuro:  =no active issues  -Neuro checks q 2 hrs and prn for changes  -activity as tolerated  -physical therapy consult when stable  -dilauded 0.5 mg IV prn for pain 7-10/10    #Pulm:  =Asthma, Cor pulmonale, PulmHTN (class I/VI)  -Supplemental O2 prn to maintain SPO2 > 92% (currently 3 liters N/C)  -Ipratropium q 6 hrs and prn for sob/wheezes  -HOB >/= 30 degree angle  -Marcela to 5 ppm as tolerated  -continue with subq Treprostinil therapy with eventual plan for IV  -consider obtaining I.R. consult for tunnel catheter placement   -as SBP improved and stable consider re institution of sildenafil 10mg po qd    #CV:  =resolving acute on chronic cor pulmonale due to pulmHTN, echogenic mass in RA concerning for clot in transit (found to be organized old clot)  -resolved HYPOtn  -dobutamine gtt - currently on 2.5 mcg/kg/min -consider d/c   -TTE 8/31/22 - hyperdynamic LVSF, new echogenic mass in RA, RVE concerning for clot in transit, severe PulmHTN  -CTA chest 8/31/22  re-demonstrated severe dilatation of Pulm arteries, without new P.E.  -Duplex bilat L.E. 9/1/22 - No DVT  -s/p catheter directed thrombus aspiration 8/31/22 (see vascular cardiology note)  -Treprostinil therapy as above  -home med of rivaroxaban 10 mg po qd - currently on hold   -Heparin gtt started 8/31/22 - titrate to PTT of 60 to 99    #GI/:  =resolving RUQ pain, improving transaminitis (most likely due to congestive hepatopathy), resolving EDOUARD (most likely pre-renal)  -received RUQ U.S. 8/30/22 - CBD 7 mm, mild hepatic steatosis  -strict I & O's - keep negative  -lasix  40 mg po qd therapy re-started    #ID:  =resolving sepsis  -Sputum Cx 8/31/22 - mod PMN, rare squamous epi cells, mod gm+ cocci in pairs, few gm- rods  -Blood Culture 8/31/22 - NGTD  -Blood Culture 8/29/22 - NGTD  -MRSA/MSSA 8/29/22 - Detected  -Blood Cultrues 8/29/22 - Staph Lugdunensis  -RVP 8/28/22 - ND  -SARS-CoV-2 8/28/22 - ND   -continue Mupirocin 2% therapy q 12 hrs x5 days (started 8/30/22)  -s/p aztreonam therapy - (8/29/22-9/1/22)    #FEN/ENDO/HEME:  =improving transaminitis, resolving elevate INR  -obtain CMP/Mg++/PO--4/CBC w diff/PT/PTT/INR now and q. a.m.  -Obtain methemoglobin level (Marcela)  -trend LFT's   42 yr old female with stated hx significant for PulmHTN class I/VI on Treprostinil therapy, Chronic PE who originally presented with palpitations accompanied by orthopnea/N/V/RUQ abd pain over a 2 day period. Course c/b hypotension secondary to acute on chronic cor pulmonale with end organ dysfx (EDOUARD, transaminitis).    Plan:    #Neuro:  =no active issues  -Neuro checks q 2 hrs and prn for changes  -activity as tolerated  -physical therapy consult when stable  -dilauded 0.5 mg IV prn for pain 7-10/10    #Pulm:  =Asthma, Cor pulmonale, PulmHTN (class I/VI)  -Supplemental O2 prn to maintain SPO2 > 92% (currently 3 liters N/C)  -Ipratropium q 6 hrs and prn for sob/wheezes  -HOB >/= 30 degree angle  -Marcela to 5 ppm as tolerated  -continue with subq Treprostinil therapy with eventual plan for IV  -consider obtaining I.R. consult for tunnel catheter placement   -as SBP improved and stable consider re institution of sildenafil 10mg po qd    #CV:  =resolving acute on chronic cor pulmonale due to pulmHTN, echogenic mass in RA concerning for clot in transit (found to be organized old clot)  -resolved HYPOtn  -dobutamine gtt - currently on 2.5 mcg/kg/min -consider d/c   -TTE 8/31/22 - hyperdynamic LVSF, new echogenic mass in RA, RVE concerning for clot in transit, severe PulmHTN  -CTA chest 8/31/22  re-demonstrated severe dilatation of Pulm arteries, without new P.E.  -Duplex bilat L.E. 9/1/22 - No DVT  -s/p catheter directed thrombus aspiration 8/31/22 (see vascular cardiology note)  -Treprostinil therapy as above  -sildenafil as above  -home med of rivaroxaban 10 mg po qd - currently on hold   -Heparin gtt started 8/31/22 - titrate to PTT of 60 to 99    #GI/:  =resolving RUQ pain, improving transaminitis (most likely due to congestive hepatopathy), resolving EDOUARD (most likely pre-renal)  -received RUQ U.S. 8/30/22 - CBD 7 mm, mild hepatic steatosis  -strict I & O's - keep negative  -lasix therapy prn     #ID:  =resolving sepsis  -Sputum Cx 8/31/22 - mod PMN, rare squamous epi cells, mod gm+ cocci in pairs, few gm- rods  -Blood Culture 8/31/22 - NGTD  -Blood Culture 8/29/22 - NGTD  -MRSA/MSSA 8/29/22 - Detected  -Blood Cultrues 8/29/22 - Staph Lugdunensis  -RVP 8/28/22 - ND  -SARS-CoV-2 8/28/22 - ND   -continue Mupirocin 2% therapy q 12 hrs x5 days (started 8/30/22)  -s/p aztreonam therapy - (8/29/22-9/1/22)    #FEN/ENDO/HEME:  =improving transaminitis, resolving elevate INR  -obtain CMP/Mg++/PO--4/CBC w diff/PT/PTT/INR now and q. a.m.  -Obtain methemoglobin level (Marcela)  -trend LFT's

## 2022-09-02 NOTE — PROGRESS NOTE ADULT - PROBLEM SELECTOR PLAN 3
n inhaled nitro and continued on remodulin (eventual transition to IV), Patient's pulmonologist is aware. Reassessment for lung transplant? Plan to decrease nitro then . Eventual reinitiation of sildenafil

## 2022-09-02 NOTE — PROGRESS NOTE ADULT - PROBLEM SELECTOR PLAN 5
Now s/p catheter directed thrombus aspiration, vascular cardiology following, heparin per MICU/Vascular cards/Vascular surgery given pseudoaneurysm

## 2022-09-02 NOTE — PROGRESS NOTE ADULT - SUBJECTIVE AND OBJECTIVE BOX
Interval History:  Patient found to have L femoral Pseudoaneurysm vascular cardiology following     Medications:  aluminum hydroxide/magnesium hydroxide/simethicone Suspension 30 milliLiter(s) Oral every 4 hours PRN  artificial  tears Solution 1 Drop(s) Both EYES three times a day PRN  chlorhexidine 4% Liquid 1 Application(s) Topical <User Schedule>  cyanocobalamin 1000 MICROGram(s) Oral daily  DOBUTamine Infusion 2.5 MICROgram(s)/kG/Min IV Continuous <Continuous>  ipratropium    for Nebulization 500 MICROGram(s) Nebulizer every 6 hours  melatonin 3 milliGRAM(s) Oral at bedtime PRN  mupirocin 2% Nasal 1 Application(s) Both Nostrils two times a day  ondansetron Injectable 4 milliGRAM(s) IV Push every 8 hours PRN  pantoprazole    Tablet 40 milliGRAM(s) Oral before breakfast  polyethylene glycol 3350 17 Gram(s) Oral daily  predniSONE   Tablet 10 milliGRAM(s) Oral daily  Remodulin (Treprostinil) 5mG/mL 1 Vial(s) 1 Vial(s) SubCutaneous Continuous Pump  senna 2 Tablet(s) Oral at bedtime  sodium chloride 0.65% Nasal 1 Spray(s) Both Nostrils every 6 hours PRN  sodium chloride 0.9% lock flush 10 milliLiter(s) IV Push every 1 hour PRN  spironolactone 25 milliGRAM(s) Oral daily      Vitals:  T(C): 36.5 (09-02-22 @ 16:00), Max: 36.8 (09-02-22 @ 00:00)  HR: 106 (09-02-22 @ 18:00) (87 - 106)  BP: 108/72 (09-02-22 @ 12:51) (86/55 - 117/66)  BP(mean): 85 (09-02-22 @ 12:51) (66 - 85)  RR: 24 (09-02-22 @ 18:00) (14 - 26)  SpO2: 100% (09-02-22 @ 18:00) (94% - 100%)    Daily     Daily         I&O's Summary    01 Sep 2022 07:01  -  02 Sep 2022 07:00  --------------------------------------------------------  IN: 1458.2 mL / OUT: 2500 mL / NET: -1041.8 mL    02 Sep 2022 07:01  -  02 Sep 2022 18:29  --------------------------------------------------------  IN: 1321.6 mL / OUT: 350 mL / NET: 971.6 mL        Physical Exam:  Limited exam due to procedure, but JVD improved       Labs:                        11.8   11.63 )-----------( 222      ( 02 Sep 2022 12:58 )             37.2     09-02    132<L>  |  97  |  9   ----------------------------<  123<H>  4.9   |  25  |  0.90    Ca    8.8      02 Sep 2022 12:58  Phos  3.0     09-02  Mg     1.8     09-02    TPro  6.7  /  Alb  3.3  /  TBili  0.8  /  DBili  x   /  AST  54<H>  /  ALT  369<H>  /  AlkPhos  60  09-02    PT/INR - ( 02 Sep 2022 00:22 )   PT: 14.1 sec;   INR: 1.21 ratio         PTT - ( 02 Sep 2022 07:31 )  PTT:54.6 sec

## 2022-09-02 NOTE — PROCEDURE NOTE - NSPOSTPRCRAD_GEN_A_CORE
Guidewire ascertained in Rt IJ via POCUS, catheter filtered over guidewire, guidewire removed in entirety. Lung sliding ascertained via POCUS/central line located in the Guidewire ascertained in Rt IJ via POCUS, catheter filtered over guidewire, guidewire removed in entirety. Lung sliding ascertained via POCUS/central line located in the/post-procedure radiography performed

## 2022-09-02 NOTE — PROGRESS NOTE ADULT - SUBJECTIVE AND OBJECTIVE BOX
CHIEF COMPLAINT:  Patient is a 42y old  Female who presents with a chief complaint of Palpitations (01 Sep 2022 12:22)    HPI:      42 yr old female with PMHx of PulmHTN class I/VI (dx 2014) on continuous Treprostinil (Remodulin) (sildenafil - currently on hold) c/b Rt heart failure s/p PPM (Dual chamber 2016), Chronic P.E. on rivaroxaban (dx 2017), SVT s/p ablation (5/2020), Asthma (chronic steroid use - prednisone), Fe deficiency anemia who originally admitted to medicine floor  8/28/22 with c/o of palpitations accompanied by orthopnea/N/V/RUQ abd pain over a 2 day period.  This short hospital course c/b hypotension with SBP 80/41 accompanied by chest pain, transaminitis, EDOUARD with peak sCr 3.05 (now resolved, baseline sCr of .90-1.24) requiring transfer to MICU evening 8/28/22 for hypotension in setting of acute on chronic cor pulmonale/pulmHTN with end organ dysfunction (EDOUARD, transaminitis). Pt placed on dobutamine/diuretic/Marcela therapies. Course further c/b TTE (8/31/22) finding of new echogenic mass in RA concerning for clot in transit, CTA chest (8/31/22) re-demonstrated severe dilatation of Pulm arteries, without new P.E., received catheter - directed thrombus aspiration (see note interventional Cards 8/31/22)    Interval Events:      REVIEW OF SYSTEMS:  CONSTITUTIONAL:           No weakness, fevers or chills, + occasional  H/A  EYES/ENT:           No visual changes;  No vertigo or throat pain   NECK:            No pain or stiffness  RESPIRATORY:            occasional productive cough, wheezing, without hemoptysis; without shortness of breath  CARDIOVASCULAR:            No chest pain or palpitations  GASTROINTESTINAL:           No abdominal or epigastric pain. No nausea, vomiting, or hematemesis; No diarrhea or constipation. No melena or hematochezia.  GENITOURINARY:            No dysuria, frequency or hematuria  NEUROLOGICAL:            No numbness or weakness  SKIN:            No pruritis , rashes            OBJECTIVE:  ICU Vital Signs Last 24 Hrs  T(C): 36.8 (02 Sep 2022 00:00), Max: 36.8 (02 Sep 2022 00:00)  T(F): 98.2 (02 Sep 2022 00:00), Max: 98.2 (02 Sep 2022 00:00)  HR: 102 (02 Sep 2022 04:00) (85 - 110)  BP: 96/56 (02 Sep 2022 04:00) (86/55 - 112/69)  BP(mean): 69 (02 Sep 2022 04:00) (66 - 94)  ABP: 87/57 (02 Sep 2022 03:00) (72/54 - 105/58)  ABP(mean): 68 (02 Sep 2022 03:00) (61 - 92)  RR: 17 (02 Sep 2022 04:00) (13 - 26)  SpO2: 98% (02 Sep 2022 04:00) (97% - 100%)    O2 Parameters below as of 02 Sep 2022 04:00  Patient On (Oxygen Delivery Method): nasal cannula  O2 Flow (L/min): 2            08-31 @ 07:01  -  09-01 @ 07:00  --------------------------------------------------------  IN: 1483.2 mL / OUT: 1050 mL / NET: 433.2 mL    09-01 @ 07:01  -  09-02 @ 04:58  --------------------------------------------------------  IN: 1275.8 mL / OUT: 1900 mL / NET: -624.2 mL      CAPILLARY BLOOD GLUCOSE      PHYSICAL EXAM:  GENERAL:   NAD, well-groomed, obese    HEAD:    Atraumatic, Normocephalic    EYES:   EOMI, PERRLA 3 mm, conjunctiva and sclera clear    ENMT:   No oropharyngeal exudates, erythema or lesions,  Moist mucous membranes    NECK:   Supple, no cervical lymphadenopathy, no JVD    NERVOUS SYSTEM:  Alert & Oriented X3, CN II-XII intact, has spontaneous purposeful movement of all extremities; Upper extremities  5/5; Lower extremities 4/5, full sensation to light touch     CHEST/LUNG:   Utilizing 3 liters N/C with 10 PPM of Marcela   .Breath sounds bilaterally without  crackles, rhonchi, end inspiratory/expiratory wheezing in RLL field, end expiratory wheezes in Lt LLL field, without or rubs. POCUS:     HEART:   cardiac monitor sinus tach without ectopy   ; S1/S2 I/VI sys murmur, without rubs, or gallops, POCUS:      ABDOMEN:   Soft, Nontender, Nondistended; Bowel sounds present, Bladder non distended, non palpable    EXTREMITIES:   Pulses palpable radial pulses 2+ bilat, DP/PT 1+/1+ bilat, without clubbing, cyanosis. Digits warm to touch with good cap refill < 3 secs. Rt Radial A-line site without redness, infiltration, swelling, tenderness, pain, dressing dry and intact. Lt fem TLC d/c'ed 9/1/22, site without redness, infiltration, swelling, tenderness, pain, dressing dry and intact     SKIN:   warm, dry, intact, normal color, no rash or abnormal lesions, without palpable nodes              HOSPITAL MEDICATIONS:  MEDICATIONS  (STANDING):  chlorhexidine 4% Liquid 1 Application(s) Topical <User Schedule>  cyanocobalamin 1000 MICROGram(s) Oral daily  DOBUTamine Infusion 2.5 MICROgram(s)/kG/Min (7.82 mL/Hr) IV Continuous <Continuous>  heparin  Infusion 1000 Unit(s)/Hr (13 mL/Hr) IV Continuous <Continuous>  ipratropium    for Nebulization 500 MICROGram(s) Nebulizer every 6 hours  mupirocin 2% Nasal 1 Application(s) Both Nostrils two times a day  pantoprazole    Tablet 40 milliGRAM(s) Oral before breakfast  polyethylene glycol 3350 17 Gram(s) Oral daily  predniSONE   Tablet 10 milliGRAM(s) Oral daily  Remodulin (Treprostinil) 5mG/mL 1 Vial(s) 1 Vial(s) SubCutaneous Continuous Pump  senna 2 Tablet(s) Oral at bedtime  spironolactone 25 milliGRAM(s) Oral daily    MEDICATIONS  (PRN):  aluminum hydroxide/magnesium hydroxide/simethicone Suspension 30 milliLiter(s) Oral every 4 hours PRN Dyspepsia  artificial  tears Solution 1 Drop(s) Both EYES three times a day PRN Dry Eyes  melatonin 3 milliGRAM(s) Oral at bedtime PRN Insomnia  ondansetron Injectable 4 milliGRAM(s) IV Push every 8 hours PRN Nausea and/or Vomiting  sodium chloride 0.65% Nasal 1 Spray(s) Both Nostrils every 6 hours PRN Nasal Congestion  sodium chloride 0.9% lock flush 10 milliLiter(s) IV Push every 1 hour PRN Pre/post blood products, medications, blood draw, and to maintain line patency      LABS:                        11.9   12.05 )-----------( 238      ( 02 Sep 2022 00:22 )             37.6     Hgb Trend: 11.9<--, 12.7<--, 13.0<--, 14.5<--, 14.2<--  09-02    134<L>  |  97  |  12  ----------------------------<  102<H>  4.0   |  25  |  0.78    Ca    8.1<L>      02 Sep 2022 00:22  Phos  2.9     09-02  Mg     1.9     09-02    TPro  6.6  /  Alb  3.3  /  TBili  0.8  /  DBili  x   /  AST  55<H>  /  ALT  443<H>  /  AlkPhos  61  09-02    LIVER FUNCTIONS - ( 02 Sep 2022 00:22 )  Alb: 3.3 g/dL / Pro: 6.6 g/dL / ALK PHOS: 61 U/L / ALT: 443 U/L / AST: 55 U/L / GGT: x           Creatinine Trend: 0.78<--, 0.88<--, 0.92<--, 1.07<--, 1.19<--, 1.62<--  PT/INR - ( 02 Sep 2022 00:22 )   PT: 14.1 sec;   INR: 1.21 ratio         PTT - ( 02 Sep 2022 00:22 )  PTT:51.1 sec    Arterial Blood Gas:  09-02 @ 00:10  7.44/43/83/29/97.3/4.5  ABG lactate: --  Arterial Blood Gas:  09-01 @ 13:30  7.46/40/110/28/98.8/4.2  ABG lactate: --  Arterial Blood Gas:  09-01 @ 00:40  7.38/40/162/24/99.4/-1.3  ABG lactate: --  Arterial Blood Gas:  08-31 @ 12:13  7.44/39/108/26/--/2.3  ABG lactate: --    Venous Blood Gas:  09-01 @ 15:45  7.38/48/42/28/73.3  VBG Lactate: 1.5  Venous Blood Gas:  09-01 @ 13:30  7.38/52/39/31/61.5  VBG Lactate: 1.4      MICROBIOLOGY:     RADIOLOGY:  [ ] Reviewed and interpreted by me    EKG:      [facility] (ext [id number])

## 2022-09-02 NOTE — PROCEDURE NOTE - NSPROCDETAILS_GEN_ALL_CORE
guidewire recovered/lumen(s) aspirated and flushed/sterile dressing applied/sterile technique, catheter placed/ultrasound guidance with use of sterile gel and probe cove
guidewire recovered/lumen(s) aspirated and flushed/sterile dressing applied/sterile technique, catheter placed/ultrasound guidance with use of sterile gel and probe cove
location identified, draped/prepped, sterile technique used, needle inserted/introduced/positive blood return obtained via catheter/connected to a pressurized flush line/sutured in place/hemostasis with direct pressure, dressing applied/Seldinger technique/all materials/supplies accounted for at end of procedure
location identified, draped/prepped, sterile technique used, needle inserted/introduced/positive blood return obtained via catheter/connected to a pressurized flush line/sutured in place/hemostasis with direct pressure, dressing applied/Seldinger technique/all materials/supplies accounted for at end of procedure

## 2022-09-02 NOTE — CHART NOTE - NSCHARTNOTEFT_GEN_A_CORE
Nutrition Follow Up Note  Patient seen for: initial malnutrition follow up. Chart reviewed and events noted.     Per Chart: Pt is a 42 year old female with stated hx significant for PulmHTN class I/VI on Treprostinil therapy, Chronic PE who originally presented with palpitations accompanied by orthopnea/N/V/RUQ abd pain over a 2 day period. Course c/b hypotension secondary to acute on chronic cor pulmonale with end organ dysfx (EDOUARD, transaminitis).    Source: [x] Patient       [x] Medical Record        [] RN        [] Family at bedside       [] Other:    -If unable to interview patient: [] Trach/Vent/BiPAP  [] Disoriented/confused/inappropriate to interview    Nutrition-Related Events:   - Pressors:  [] Yes    [x] No   - Propofol:  [] Yes    [x] No         - Rate: __mL/hr. If maintained x 24 hours, propofol will provide:     Diet Order:   Diet, Regular:   Supplement Feeding Modality:  Oral  Ensure Enlive Cans or Servings Per Day:  1       Frequency:  Daily (22)    Is current diet order appropriate/adequate? See recommendations below    PO intake :   [] >75%  Adequate    [x] 50-75%  Fair       [] <50%  Poor    Pt with a slight improvement in PO intake and appetite. Pt awaiting breakfast tray this morning; has yet to try Ensure Enlive however still amenable to trialing.     Nutrition-related concerns:  - Ordered for Vitamin B12  - Resolving RUQ pain, improving transaminitis  - Resolving EDOUARD  - Resolving sepsis   - On steroid     GI: Pt denies recent N/V or diarrhea.  Last BM .   Bowel Regimen? [x] Yes   [] No  -> Ordered for pantoprazole     Weights:   Daily Weight in k.3 ( -  obtained), 104 (), 73.2 ()    Dosing wt in k.3 ()  Ht: 165.1 cm  BMI: 38.3 kG/m2    MEDICATIONS  (STANDING):  cyanocobalamin 1000 MICROGram(s) Oral daily  DOBUTamine Infusion 2.5 MICROgram(s)/kG/Min (7.82 mL/Hr) IV Continuous <Continuous>  heparin  Infusion 1000 Unit(s)/Hr (13 mL/Hr) IV Continuous <Continuous>  pantoprazole    Tablet 40 milliGRAM(s) Oral before breakfast  polyethylene glycol 3350 17 Gram(s) Oral daily  predniSONE   Tablet 10 milliGRAM(s) Oral daily  Remodulin (Treprostinil) 5mG/mL 1 Vial(s) 1 Vial(s) SubCutaneous Continuous Pump  senna 2 Tablet(s) Oral at bedtime  sildenafil (REVATIO) 10 milliGRAM(s) Oral once  spironolactone 25 milliGRAM(s) Oral daily    Pertinent Labs:  @ 00:22: Na 134<L>, BUN 12, Cr 0.78, <H>, K+ 4.0, Phos 2.9, Mg 1.9, Alk Phos 61, ALT/SGPT 443<H>, AST/SGOT 55<H>   @ 13:53: Na 131<L>, BUN 11, Cr 0.88, <H>, K+ 4.9, Phos 2.8, Mg 1.8, Alk Phos 63, ALT/SGPT 546<H>, AST/SGOT 65<H>    Skin per nursing documentation:   Edema per nursing documentation:     Weight used for calculations	current weight  Estimated Energy Needs Weight (lbs)	210 lb  Estimated Energy Needs Weight (kg)	95.2 kg  Estimated Energy Needs From (jerry/kg)	20  Estimated Energy Needs To (jerry/kg)	24  Estimated Energy Needs Calculated From (jerry/kg)	1904  Estimated Energy Needs Calculated To (jerry/kg)	2284  Weight used for calculations	IBW  Estimated Protein Needs Weight (lbs)	125 lb  Estimated Protein Needs Weight (kg)	56.6 kg  Estimated Protein Needs From (g/kg)	1.6  Estimated Protein Needs To (g/kg)	2  Estimated Protein Needs Calculated From (g/kg)	90.56  Estimated Protein Needs Calculated To (g/kg)	113.2      Estimated Energy Needs:   Estimated Protein Needs:  Estimated Fluid Needs:   [place] Equation:    Previous Nutrition Diagnosis: Mild acute malnutrition  Nutrition Diagnosis is: [x] ongoing  [] resolved [] not applicable     Nutrition Care Plan:  [x] In Progress  [] Achieved  [] Not applicable    New Nutrition Diagnosis: [] Not applicable    Nutrition Interventions:     Education Provided:       [] Yes:  [] No:     Recommendations:        Monitoring and Evaluation:   Continue to monitor nutritional intake, tolerance to diet prescription, weights, labs, skin integrity    RD remains available upon request and will follow up per protocol  Jennifer Landeros RD, MS, CDN, [facility] Pager #[phone] Nutrition Follow Up Note  Patient seen for: initial malnutrition follow up. Chart reviewed and events noted.     Per Chart: Pt is a 42 year old female with stated hx significant for PulmHTN class I/VI on Treprostinil therapy, Chronic PE who originally presented with palpitations accompanied by orthopnea/N/V/RUQ abd pain over a 2 day period. Course c/b hypotension secondary to acute on chronic cor pulmonale with end organ dysfx (EDOUARD, transaminitis).    Source: [x] Patient       [x] Medical Record        [] RN        [] Family at bedside       [] Other:    -If unable to interview patient: [] Trach/Vent/BiPAP  [] Disoriented/confused/inappropriate to interview    Nutrition-Related Events:   - Pressors:  [] Yes    [x] No   - Propofol:  [] Yes    [x] No         - Rate: __mL/hr. If maintained x 24 hours, propofol will provide:     Diet Order:   Diet, Regular:   Supplement Feeding Modality:  Oral  Ensure Enlive Cans or Servings Per Day:  1       Frequency:  Daily (22)    Is current diet order appropriate/adequate? See recommendations below    PO intake :   [] >75%  Adequate    [x] 50-75%  Fair       [] <50%  Poor    Pt with a slight improvement in PO intake and appetite. Pt awaiting breakfast tray this morning; has yet to try Ensure Enlive however still amenable to trialing.     Nutrition-related concerns:  - Ordered for Vitamin B12  - Resolving RUQ pain, improving transaminitis  - Resolving EDOUARD  - Resolving sepsis   - On steroid     GI: Pt denies recent N/V or diarrhea.  Last BM .   Bowel Regimen? [x] Yes   [] No  -> Ordered for pantoprazole     Weights:   Daily Weight in k.2 ( -  obtained), 104 (), 73.2 ()  ? accuracy of wt Hx, possibly secondary to fluid shifts as pt with varying edema vs inaccurate bed scale     Dosing wt in k.3 ()  Ht: 165.1 cm  BMI: 38.3 kG/m2    MEDICATIONS  (STANDING):  cyanocobalamin 1000 MICROGram(s) Oral daily  DOBUTamine Infusion 2.5 MICROgram(s)/kG/Min (7.82 mL/Hr) IV Continuous <Continuous>  heparin  Infusion 1000 Unit(s)/Hr (13 mL/Hr) IV Continuous <Continuous>  pantoprazole    Tablet 40 milliGRAM(s) Oral before breakfast  polyethylene glycol 3350 17 Gram(s) Oral daily  predniSONE   Tablet 10 milliGRAM(s) Oral daily  Remodulin (Treprostinil) 5mG/mL 1 Vial(s) 1 Vial(s) SubCutaneous Continuous Pump  senna 2 Tablet(s) Oral at bedtime  sildenafil (REVATIO) 10 milliGRAM(s) Oral once  spironolactone 25 milliGRAM(s) Oral daily    Pertinent Labs:  @ 00:22: Na 134<L>, BUN 12, Cr 0.78, <H>, K+ 4.0, Phos 2.9, Mg 1.9, Alk Phos 61, ALT/SGPT 443<H>, AST/SGOT 55<H>   @ 13:53: Na 131<L>, BUN 11, Cr 0.88, <H>, K+ 4.9, Phos 2.8, Mg 1.8, Alk Phos 63, ALT/SGPT 546<H>, AST/SGOT 65<H>    Skin per nursing documentation: No pressure injuries noted.  Edema per nursing documentation: None noted.     Estimated Energy Needs: (20-24 kcals/kG based on RD obtained wt 95.2 kG ) 5378-7231 kcals  Estimated Protein Needs: (1.6-2.0 gm/kG based on IBW 56.6 kG) 90.6-113.2 gm  Fluid needs deferred to provider.     Previous Nutrition Diagnosis: Mild acute malnutrition  Nutrition Diagnosis is: [x] ongoing  [] resolved [] not applicable     Nutrition Care Plan:  [x] In Progress  [] Achieved  [] Not applicable    New Nutrition Diagnosis: Overweigh/Obesity related to suspect Hx of excessive energy intake as evidenced by BMI 38.3 kG/m2    Nutrition Interventions:     Education Provided:       [x] Yes:  [] No: RD reinforced need for adequate protein-energy intake. RD also reviewed nutrition therapy for constipation. Pt verbalized understanding with no questions at this time. Pt made aware RD to remain available.     Recommendations:      1) Consider addition of Low Na to diet.  2) Continue Ensure Enlive x1 daily.  3) As medically feasible, continue to provide micronutrients.  4) Reinforce nutrition education as able.     Monitoring and Evaluation:   Continue to monitor nutritional intake, tolerance to diet prescription, weights, labs, skin integrity    RD remains available upon request and will follow up per protocol  Jennifer Landeros RD, MS, CDN, [facility] Pager #[phone] Nutrition Follow Up Note  Patient seen for: initial malnutrition follow up. Chart reviewed and events noted.     Per Chart: Pt is a 42 year old female with stated hx significant for PulmHTN class I/VI on Treprostinil therapy, Chronic PE who originally presented with palpitations accompanied by orthopnea/N/V/RUQ abd pain over a 2 day period. Course c/b hypotension secondary to acute on chronic cor pulmonale with end organ dysfx (EDOUARD, transaminitis).    Source: [x] Patient       [x] Medical Record        [] RN        [] Family at bedside       [] Other:    -If unable to interview patient: [] Trach/Vent/BiPAP  [] Disoriented/confused/inappropriate to interview    Nutrition-Related Events:   - Pressors:  [] Yes    [x] No   - Propofol:  [] Yes    [x] No         - Rate: __mL/hr. If maintained x 24 hours, propofol will provide:     Diet Order:   Diet, Regular:   Supplement Feeding Modality:  Oral  Ensure Enlive Cans or Servings Per Day:  1       Frequency:  Daily (22)    Is current diet order appropriate/adequate? See recommendations below    PO intake :   [] >75%  Adequate    [x] 50-75%  Fair       [] <50%  Poor    Pt with a slight improvement in PO intake and appetite. Pt awaiting breakfast tray this morning; has yet to try Ensure Enlive however still amenable to trialing.     Nutrition-related concerns:  - Ordered for Vitamin B12  - Resolving RUQ pain, improving transaminitis  - Resolving EDOUARD  - Resolving sepsis   - On steroid     GI: Pt denies recent N/V or diarrhea.  Last BM .   Bowel Regimen? [x] Yes   [] No  -> Ordered for pantoprazole     Weights:   Daily Weight in k.2 ( -  obtained), 104 (), 73.2 ()  ? accuracy of wt Hx, possibly secondary to fluid shifts as pt with varying edema vs inaccurate bed scale     Dosing wt in k.3 ()  Ht: 165.1 cm  BMI: 38.3 kG/m2    MEDICATIONS  (STANDING):  cyanocobalamin 1000 MICROGram(s) Oral daily  DOBUTamine Infusion 2.5 MICROgram(s)/kG/Min (7.82 mL/Hr) IV Continuous <Continuous>  heparin  Infusion 1000 Unit(s)/Hr (13 mL/Hr) IV Continuous <Continuous>  pantoprazole    Tablet 40 milliGRAM(s) Oral before breakfast  polyethylene glycol 3350 17 Gram(s) Oral daily  predniSONE   Tablet 10 milliGRAM(s) Oral daily  Remodulin (Treprostinil) 5mG/mL 1 Vial(s) 1 Vial(s) SubCutaneous Continuous Pump  senna 2 Tablet(s) Oral at bedtime  sildenafil (REVATIO) 10 milliGRAM(s) Oral once  spironolactone 25 milliGRAM(s) Oral daily    Pertinent Labs:  @ 00:22: Na 134<L>, BUN 12, Cr 0.78, <H>, K+ 4.0, Phos 2.9, Mg 1.9, Alk Phos 61, ALT/SGPT 443<H>, AST/SGOT 55<H>   @ 13:53: Na 131<L>, BUN 11, Cr 0.88, <H>, K+ 4.9, Phos 2.8, Mg 1.8, Alk Phos 63, ALT/SGPT 546<H>, AST/SGOT 65<H>    Skin per nursing documentation: No pressure injuries noted.  Edema per nursing documentation: None noted.     Estimated Energy Needs: (20-24 kcals/kG based on RD obtained wt 95.2 kG ) 0212-1243 kcals  Estimated Protein Needs: (1.6-2.0 gm/kG based on IBW 56.6 kG) 90.6-113.2 gm  Fluid needs deferred to provider.     Previous Nutrition Diagnosis: Mild acute malnutrition  Nutrition Diagnosis is: [x] ongoing  [] resolved [] not applicable     Nutrition Care Plan:  [x] In Progress  [] Achieved  [] Not applicable    New Nutrition Diagnosis: Overweight/Obesity related to suspect Hx of excessive energy intake as evidenced by BMI 38.3 kG/m2  Goal: Pt to gradually and intentionally lose wt towards IBW.     Nutrition Interventions:     Education Provided:       [x] Yes:  [] No: RD reinforced need for adequate protein-energy intake. RD also reviewed nutrition therapy for constipation. Pt verbalized understanding with no questions at this time. Pt made aware RD to remain available.     Recommendations:      1) Consider addition of Low Na to diet.  2) Continue Ensure Enlive x1 daily.  3) As medically feasible, continue to provide micronutrients.  4) Reinforce nutrition education as able.     Monitoring and Evaluation:   Continue to monitor nutritional intake, tolerance to diet prescription, weights, labs, skin integrity    RD remains available upon request and will follow up per protocol  Jennifer Landeros RD, MS, CDN, [facility] Pager #[phone]

## 2022-09-02 NOTE — PROGRESS NOTE ADULT - PROBLEM SELECTOR PLAN 1
Elevated BNP,  BNP is 14K with evidence of end organ damage now improving   Transferred to MICU   - Wean  after nitro   - K>4, Mg >2  - Monitor I/O, daily weights   - stop lasix, c/w spironolactone  - Daily standing weights

## 2022-09-02 NOTE — PROGRESS NOTE ADULT - PROBLEM SELECTOR PLAN 2
-Would recommend device interrogation this admission (not overnight or on weekend unless patient has arrhythmia)

## 2022-09-02 NOTE — PROGRESS NOTE ADULT - ATTENDING COMMENTS
Briefly, 42F PMH PE on xarelto, pulmonary HTN (likely group I and group IV), ILD, JULIA, obesity presents to the hospital for 2 days of palpitations and abdominal pain/nausea/emesis. Denies any inciting event. Noted to have labile hypotension and labs notable for worsening transaminitis, BNP 9k (much higher than prior) as well as acute kidney injury. Feels improved since initiation of /Marcela. Found to have RA clot on repeat TTE and underwent CTA which did not reveal any PE but went for aspiration and had partial throbus removal and stopped as appeared more chronic. Changed to heparin gtt. On exam, JVP normal, prominent P2, tachycardic, CTAB, nontender abdomen, warm extremities. Labs reviewed - improving LFTs and renal function normalized. TTE done which showed hyperdynamic and compressed LV, severe RV dysfunction, mod TR, dilated IVC. Repeat TTE with improved RV function however RA thrombus noted. Overall stage D, NYHA class IV with severe pulmonary HTN.  - on Marcela at 5 ppm and dobutamine 2.5 mcg/kg/min  - c/w Remodulin; d/w Dr. Yoon possible transition to IV Remodulin   - c/w jacqueline 25 mg daily   - vascular c/s for R pseudoaneurysm  - will need to evaluate if patient candidate for transplant but unlikely d/t BMI  - prognosis guarded

## 2022-09-02 NOTE — CONSULT NOTE ADULT - ASSESSMENT
42F with PMHx of PulmHTN class I/VI (dx 2014) on continuous Treprostinil (Remodulin) (sildenafil - currently on hold) c/b Rt heart failure s/p PPM (Dual chamber 2016), chronic P.E. on rivaroxaban (dx 2017), SVT s/p ablation (5/2020), Asthma (chronic steroid use - prednisone), Fe deficiency anemia. Underwent catheter - directed thrombus aspiration 8/31 for RA mass concerning for clot in transit. Vascular Surgery consulted for L femoral pseudoaneurysm from PCI, confirmed on US.    PLAN  - US guided pressure  - Prefer to hold AC until follow up US duplex tomorrow, but defer to the primary team    Discussed with Dr. Castro on behalf of Dr. Díaz.    Vascular Surgery  [id number]

## 2022-09-02 NOTE — PHYSICAL THERAPY INITIAL EVALUATION ADULT - NSPTDISCHREC_GEN_A_CORE
TBD upon completion of functional assessment. Home with home PT for safety assessment, gait,stair negotiation, balance, & strength training and to return pt to baseline functional mobility status./Home PT

## 2022-09-02 NOTE — CONSULT NOTE ADULT - SUBJECTIVE AND OBJECTIVE BOX
42F with PMHx of PulmHTN class I/VI (dx 2014) on continuous Treprostinil (Remodulin) (sildenafil - currently on hold) c/b Rt heart failure s/p PPM (Dual chamber 2016), chronic P.E. on rivaroxaban (dx 2017), SVT s/p ablation (5/2020), Asthma (chronic steroid use - prednisone), Fe deficiency anemia.    Admitted to medicine floor 8/28/22 with c/o of palpitations accompanied by orthopnea/N/V/RUQ abd pain. Pt was transferred to MICU on 8/28 2/2 hypotension with SBP 80/41 accompanied by chest pain, transaminitis, EDOUARD with peak sCr 3.05 (now resolved, baseline sCr of .90-1.24) requiring transfer to MICU evening. Dobutamine/diuretic/Marcela therapies started. TTE (8/31/22) revealed new echogenic mass in RA concerning for clot in transit, CTA chest (8/31/22) re-demonstrated severe dilatation of Pulm arteries, without new P.E., received catheter - directed thrombus aspiration 8/31. Vascular Surgery consulted for L femoral pseudoaneurysm from PCI, confirmed on US.      PAST MEDICAL & SURGICAL HISTORY:  Tachycardia      Anemia      Pulmonary hypertension      Pulmonary embolism      Asthma  On home O2 nightly at 2L via NC      PE (pulmonary thromboembolism)  on Xarelto 10 mg daily      Sinusitis      Corneal disorder      Right heart failure      CAD (coronary artery disease)      H/O pulmonary hypertension      Pulmonary embolism      Asthma      History of tachycardia      On supplemental oxygen by nasal cannula  O2 @ 2L      Pacemaker      Skin mass  left upper thigh mass      History of tubal ligation      Pacemaker      H/O tubal ligation      History of pacemaker  12/19 - [employer] model Ingevity 4469          MEDICATIONS  (STANDING):  chlorhexidine 4% Liquid 1 Application(s) Topical <User Schedule>  cyanocobalamin 1000 MICROGram(s) Oral daily  DOBUTamine Infusion 2.5 MICROgram(s)/kG/Min (7.82 mL/Hr) IV Continuous <Continuous>  ipratropium    for Nebulization 500 MICROGram(s) Nebulizer every 6 hours  mupirocin 2% Nasal 1 Application(s) Both Nostrils two times a day  pantoprazole    Tablet 40 milliGRAM(s) Oral before breakfast  polyethylene glycol 3350 17 Gram(s) Oral daily  predniSONE   Tablet 10 milliGRAM(s) Oral daily  Remodulin (Treprostinil) 5mG/mL 1 Vial(s) 1 Vial(s) SubCutaneous Continuous Pump  senna 2 Tablet(s) Oral at bedtime  spironolactone 25 milliGRAM(s) Oral daily    MEDICATIONS  (PRN):  aluminum hydroxide/magnesium hydroxide/simethicone Suspension 30 milliLiter(s) Oral every 4 hours PRN Dyspepsia  artificial  tears Solution 1 Drop(s) Both EYES three times a day PRN Dry Eyes  melatonin 3 milliGRAM(s) Oral at bedtime PRN Insomnia  ondansetron Injectable 4 milliGRAM(s) IV Push every 8 hours PRN Nausea and/or Vomiting  sodium chloride 0.65% Nasal 1 Spray(s) Both Nostrils every 6 hours PRN Nasal Congestion  sodium chloride 0.9% lock flush 10 milliLiter(s) IV Push every 1 hour PRN Pre/post blood products, medications, blood draw, and to maintain line patency      Allergies    adhesives (Rash)  penicillin (Rash)  penicillin (Unknown)  vancomycin (Rash)  vancomycin (Unknown)    Intolerances    albuterol (Other; Rash)      SOCIAL HISTORY:    FAMILY HISTORY:  Family history of diabetes mellitus  in brother    Family history of diabetes mellitus in mother    FH: anemia            Physical Exam:  General: NAD, resting comfortably  Extremities: Left palpable dp/pt/fem, tenderness surrounding the PCI site, but no erythema, fluctuance  Neuro: A/O x 3, CNs II-XII grossly intact, normal sensation, no focal deficits    Vital Signs Last 24 Hrs  T(C): 36.5 (02 Sep 2022 16:00), Max: 36.8 (02 Sep 2022 00:00)  T(F): 97.7 (02 Sep 2022 16:00), Max: 98.2 (02 Sep 2022 00:00)  HR: 105 (02 Sep 2022 17:00) (87 - 105)  BP: 108/72 (02 Sep 2022 12:51) (86/55 - 117/66)  BP(mean): 85 (02 Sep 2022 12:51) (66 - 85)  RR: 20 (02 Sep 2022 17:00) (14 - 26)  SpO2: 100% (02 Sep 2022 17:00) (94% - 100%)    Parameters below as of 02 Sep 2022 11:22  Patient On (Oxygen Delivery Method): nasal cannula    O2 Concentration (%): 100    I&O's Summary    01 Sep 2022 07:01  -  02 Sep 2022 07:00  --------------------------------------------------------  IN: 1458.2 mL / OUT: 2500 mL / NET: -1041.8 mL    02 Sep 2022 07:01  -  02 Sep 2022 18:02  --------------------------------------------------------  IN: 1313.8 mL / OUT: 0 mL / NET: 1313.8 mL            LABS:                        11.8   11.63 )-----------( 222      ( 02 Sep 2022 12:58 )             37.2     09-02    132<L>  |  97  |  9   ----------------------------<  123<H>  4.9   |  25  |  0.90    Ca    8.8      02 Sep 2022 12:58  Phos  3.0     09-02  Mg     1.8     09-02    TPro  6.7  /  Alb  3.3  /  TBili  0.8  /  DBili  x   /  AST  54<H>  /  ALT  369<H>  /  AlkPhos  60  09-02    PT/INR - ( 02 Sep 2022 00:22 )   PT: 14.1 sec;   INR: 1.21 ratio         PTT - ( 02 Sep 2022 07:31 )  PTT:54.6 sec    CAPILLARY BLOOD GLUCOSE        LIVER FUNCTIONS - ( 02 Sep 2022 12:58 )  Alb: 3.3 g/dL / Pro: 6.7 g/dL / ALK PHOS: 60 U/L / ALT: 369 U/L / AST: 54 U/L / GGT: x             Cultures:  Culture Results:   Normal Respiratory Laura present (08-31 @ 20:45)      RADIOLOGY & ADDITIONAL STUDIES:    < from: US Duplex Arterial Lower Ext Ltd, Left (09.02.22 @ 14:21) >  ACC: [id number] EXAM:  US DPLX LWR EXT ARTS LTD LT                          PROCEDURE DATE:  09/02/2022          INTERPRETATION:  CLINICAL INFORMATION: Status post catheter directed   thrombus aspiration on 8/31/2022. Left femoral arterial access. Groin   pain.    COMPARISON: None.    TECHNIQUE:  Sonography of the left groin was performed using grayscale,   color, and spectral Doppler.    FINDINGS:  A focus of extravascular flow is visualized superficial to and arising   from the left common femoral artery, compatible with pseudoaneurysm. The   pseudoaneurysm measures 3.4 x 2.1 x 2.3 cm. The neck measures 7 mm in   length and 3 mm in width.    Additional adjacent small hypoechoic focus without associated vascularity   measuring 1.0 x 0.7 x 1.0 cm, likely a small hematoma.    The left common femoral artery and veins are patent with normal waveforms.    IMPRESSION:  Left common femoral artery pseudoaneurysm measuring 3.4 x 2.1 x 2.3 cm.        < end of copied text >

## 2022-09-02 NOTE — PHYSICAL THERAPY INITIAL EVALUATION ADULT - PATIENT/FAMILY AGREES WITH PLAN
TBD upon completion of functional eval. Home with home PT for safety assessment, gait,stair negotiation, balance, & strength training and to return pt to baseline functional mobility status./yes

## 2022-09-03 LAB
ALBUMIN SERPL ELPH-MCNC: 3.2 G/DL — LOW (ref 3.3–5)
ALBUMIN SERPL ELPH-MCNC: 3.7 G/DL — SIGNIFICANT CHANGE UP (ref 3.3–5)
ALP SERPL-CCNC: 58 U/L — SIGNIFICANT CHANGE UP (ref 40–120)
ALP SERPL-CCNC: 60 U/L — SIGNIFICANT CHANGE UP (ref 40–120)
ALT FLD-CCNC: 268 U/L — HIGH (ref 10–45)
ALT FLD-CCNC: 294 U/L — HIGH (ref 10–45)
ANION GAP SERPL CALC-SCNC: 9 MMOL/L — SIGNIFICANT CHANGE UP (ref 5–17)
ANION GAP SERPL CALC-SCNC: 9 MMOL/L — SIGNIFICANT CHANGE UP (ref 5–17)
APTT BLD: 30.2 SEC — SIGNIFICANT CHANGE UP (ref 27.5–35.5)
APTT BLD: 31.2 SEC — SIGNIFICANT CHANGE UP (ref 27.5–35.5)
AST SERPL-CCNC: 40 U/L — SIGNIFICANT CHANGE UP (ref 10–40)
AST SERPL-CCNC: 44 U/L — HIGH (ref 10–40)
BILIRUB SERPL-MCNC: 0.6 MG/DL — SIGNIFICANT CHANGE UP (ref 0.2–1.2)
BILIRUB SERPL-MCNC: 0.6 MG/DL — SIGNIFICANT CHANGE UP (ref 0.2–1.2)
BUN SERPL-MCNC: 10 MG/DL — SIGNIFICANT CHANGE UP (ref 7–23)
BUN SERPL-MCNC: 10 MG/DL — SIGNIFICANT CHANGE UP (ref 7–23)
CALCIUM SERPL-MCNC: 8.4 MG/DL — SIGNIFICANT CHANGE UP (ref 8.4–10.5)
CALCIUM SERPL-MCNC: 8.8 MG/DL — SIGNIFICANT CHANGE UP (ref 8.4–10.5)
CHLORIDE SERPL-SCNC: 93 MMOL/L — LOW (ref 96–108)
CHLORIDE SERPL-SCNC: 97 MMOL/L — SIGNIFICANT CHANGE UP (ref 96–108)
CO2 SERPL-SCNC: 27 MMOL/L — SIGNIFICANT CHANGE UP (ref 22–31)
CO2 SERPL-SCNC: 28 MMOL/L — SIGNIFICANT CHANGE UP (ref 22–31)
CREAT SERPL-MCNC: 0.79 MG/DL — SIGNIFICANT CHANGE UP (ref 0.5–1.3)
CREAT SERPL-MCNC: 0.86 MG/DL — SIGNIFICANT CHANGE UP (ref 0.5–1.3)
CULTURE RESULTS: SIGNIFICANT CHANGE UP
EGFR: 86 ML/MIN/1.73M2 — SIGNIFICANT CHANGE UP
EGFR: 96 ML/MIN/1.73M2 — SIGNIFICANT CHANGE UP
GAS PNL BLDA: SIGNIFICANT CHANGE UP
GAS PNL BLDA: SIGNIFICANT CHANGE UP
GLUCOSE SERPL-MCNC: 124 MG/DL — HIGH (ref 70–99)
GLUCOSE SERPL-MCNC: 130 MG/DL — HIGH (ref 70–99)
HCT VFR BLD CALC: 36.3 % — SIGNIFICANT CHANGE UP (ref 34.5–45)
HCT VFR BLD CALC: 37.8 % — SIGNIFICANT CHANGE UP (ref 34.5–45)
HGB BLD-MCNC: 11.3 G/DL — LOW (ref 11.5–15.5)
HGB BLD-MCNC: 12 G/DL — SIGNIFICANT CHANGE UP (ref 11.5–15.5)
INR BLD: 1.14 RATIO — SIGNIFICANT CHANGE UP (ref 0.88–1.16)
INR BLD: 1.17 RATIO — HIGH (ref 0.88–1.16)
MAGNESIUM SERPL-MCNC: 1.8 MG/DL — SIGNIFICANT CHANGE UP (ref 1.6–2.6)
MAGNESIUM SERPL-MCNC: 2.4 MG/DL — SIGNIFICANT CHANGE UP (ref 1.6–2.6)
MCHC RBC-ENTMCNC: 22.3 PG — LOW (ref 27–34)
MCHC RBC-ENTMCNC: 22.6 PG — LOW (ref 27–34)
MCHC RBC-ENTMCNC: 31.1 GM/DL — LOW (ref 32–36)
MCHC RBC-ENTMCNC: 31.7 GM/DL — LOW (ref 32–36)
MCV RBC AUTO: 71.1 FL — LOW (ref 80–100)
MCV RBC AUTO: 71.7 FL — LOW (ref 80–100)
NRBC # BLD: 0 /100 WBCS — SIGNIFICANT CHANGE UP (ref 0–0)
NRBC # BLD: 0 /100 WBCS — SIGNIFICANT CHANGE UP (ref 0–0)
ORGANISM # SPEC MICROSCOPIC CNT: SIGNIFICANT CHANGE UP
PHOSPHATE SERPL-MCNC: 3.6 MG/DL — SIGNIFICANT CHANGE UP (ref 2.5–4.5)
PHOSPHATE SERPL-MCNC: 3.7 MG/DL — SIGNIFICANT CHANGE UP (ref 2.5–4.5)
PLATELET # BLD AUTO: 185 K/UL — SIGNIFICANT CHANGE UP (ref 150–400)
PLATELET # BLD AUTO: 213 K/UL — SIGNIFICANT CHANGE UP (ref 150–400)
POTASSIUM SERPL-MCNC: 3.8 MMOL/L — SIGNIFICANT CHANGE UP (ref 3.5–5.3)
POTASSIUM SERPL-MCNC: 5.2 MMOL/L — SIGNIFICANT CHANGE UP (ref 3.5–5.3)
POTASSIUM SERPL-SCNC: 3.8 MMOL/L — SIGNIFICANT CHANGE UP (ref 3.5–5.3)
POTASSIUM SERPL-SCNC: 5.2 MMOL/L — SIGNIFICANT CHANGE UP (ref 3.5–5.3)
PROT SERPL-MCNC: 6.3 G/DL — SIGNIFICANT CHANGE UP (ref 6–8.3)
PROT SERPL-MCNC: 6.9 G/DL — SIGNIFICANT CHANGE UP (ref 6–8.3)
PROTHROM AB SERPL-ACNC: 13.3 SEC — SIGNIFICANT CHANGE UP (ref 10.5–13.4)
PROTHROM AB SERPL-ACNC: 13.5 SEC — HIGH (ref 10.5–13.4)
RBC # BLD: 5.06 M/UL — SIGNIFICANT CHANGE UP (ref 3.8–5.2)
RBC # BLD: 5.32 M/UL — HIGH (ref 3.8–5.2)
RBC # FLD: 17.2 % — HIGH (ref 10.3–14.5)
RBC # FLD: 17.2 % — HIGH (ref 10.3–14.5)
SODIUM SERPL-SCNC: 130 MMOL/L — LOW (ref 135–145)
SODIUM SERPL-SCNC: 133 MMOL/L — LOW (ref 135–145)
SPECIMEN SOURCE: SIGNIFICANT CHANGE UP
WBC # BLD: 11.14 K/UL — HIGH (ref 3.8–10.5)
WBC # BLD: 14.34 K/UL — HIGH (ref 3.8–10.5)
WBC # FLD AUTO: 11.14 K/UL — HIGH (ref 3.8–10.5)
WBC # FLD AUTO: 14.34 K/UL — HIGH (ref 3.8–10.5)

## 2022-09-03 PROCEDURE — 99291 CRITICAL CARE FIRST HOUR: CPT | Mod: GC

## 2022-09-03 PROCEDURE — 36002 PSEUDOANEURYSM INJECTION TRT: CPT

## 2022-09-03 RX ORDER — MAGNESIUM SULFATE 500 MG/ML
2 VIAL (ML) INJECTION ONCE
Refills: 0 | Status: COMPLETED | OUTPATIENT
Start: 2022-09-03 | End: 2022-09-03

## 2022-09-03 RX ORDER — POTASSIUM CHLORIDE 20 MEQ
40 PACKET (EA) ORAL ONCE
Refills: 0 | Status: COMPLETED | OUTPATIENT
Start: 2022-09-03 | End: 2022-09-03

## 2022-09-03 RX ORDER — HEPARIN SODIUM 5000 [USP'U]/ML
1400 INJECTION INTRAVENOUS; SUBCUTANEOUS
Qty: 25000 | Refills: 0 | Status: DISCONTINUED | OUTPATIENT
Start: 2022-09-03 | End: 2022-09-05

## 2022-09-03 RX ORDER — IPRATROPIUM BROMIDE 0.2 MG/ML
500 SOLUTION, NON-ORAL INHALATION EVERY 6 HOURS
Refills: 0 | Status: DISCONTINUED | OUTPATIENT
Start: 2022-09-03 | End: 2022-09-14

## 2022-09-03 RX ORDER — ACETAMINOPHEN 500 MG
650 TABLET ORAL EVERY 6 HOURS
Refills: 0 | Status: DISCONTINUED | OUTPATIENT
Start: 2022-09-03 | End: 2022-09-14

## 2022-09-03 RX ORDER — MAGNESIUM SULFATE 500 MG/ML
1 VIAL (ML) INJECTION ONCE
Refills: 0 | Status: COMPLETED | OUTPATIENT
Start: 2022-09-03 | End: 2022-09-03

## 2022-09-03 RX ORDER — HYDROMORPHONE HYDROCHLORIDE 2 MG/ML
0.5 INJECTION INTRAMUSCULAR; INTRAVENOUS; SUBCUTANEOUS ONCE
Refills: 0 | Status: DISCONTINUED | OUTPATIENT
Start: 2022-09-03 | End: 2022-09-03

## 2022-09-03 RX ORDER — POTASSIUM CHLORIDE 20 MEQ
20 PACKET (EA) ORAL ONCE
Refills: 0 | Status: COMPLETED | OUTPATIENT
Start: 2022-09-03 | End: 2022-09-03

## 2022-09-03 RX ORDER — ACETAMINOPHEN 500 MG
650 TABLET ORAL ONCE
Refills: 0 | Status: COMPLETED | OUTPATIENT
Start: 2022-09-03 | End: 2022-09-03

## 2022-09-03 RX ORDER — POLYETHYLENE GLYCOL 3350 17 G/17G
17 POWDER, FOR SOLUTION ORAL EVERY 12 HOURS
Refills: 0 | Status: DISCONTINUED | OUTPATIENT
Start: 2022-09-03 | End: 2022-09-14

## 2022-09-03 RX ORDER — FUROSEMIDE 40 MG
20 TABLET ORAL ONCE
Refills: 0 | Status: COMPLETED | OUTPATIENT
Start: 2022-09-03 | End: 2022-09-03

## 2022-09-03 RX ADMIN — MUPIROCIN 1 APPLICATION(S): 20 OINTMENT TOPICAL at 17:37

## 2022-09-03 RX ADMIN — Medication 650 MILLIGRAM(S): at 03:15

## 2022-09-03 RX ADMIN — PREGABALIN 1000 MICROGRAM(S): 225 CAPSULE ORAL at 11:12

## 2022-09-03 RX ADMIN — Medication 650 MILLIGRAM(S): at 18:31

## 2022-09-03 RX ADMIN — Medication 7.82 MICROGRAM(S)/KG/MIN: at 08:00

## 2022-09-03 RX ADMIN — Medication 10 MILLIGRAM(S): at 05:58

## 2022-09-03 RX ADMIN — HEPARIN SODIUM 14 UNIT(S)/HR: 5000 INJECTION INTRAVENOUS; SUBCUTANEOUS at 18:26

## 2022-09-03 RX ADMIN — MUPIROCIN 1 APPLICATION(S): 20 OINTMENT TOPICAL at 06:27

## 2022-09-03 RX ADMIN — Medication 20 MILLIGRAM(S): at 08:00

## 2022-09-03 RX ADMIN — Medication 10 MILLIGRAM(S): at 20:21

## 2022-09-03 RX ADMIN — CHLORHEXIDINE GLUCONATE 1 APPLICATION(S): 213 SOLUTION TOPICAL at 05:59

## 2022-09-03 RX ADMIN — Medication 10 MILLIGRAM(S): at 08:09

## 2022-09-03 RX ADMIN — Medication 650 MILLIGRAM(S): at 02:46

## 2022-09-03 RX ADMIN — Medication 40 MILLIEQUIVALENT(S): at 08:01

## 2022-09-03 RX ADMIN — Medication 500 MICROGRAM(S): at 17:44

## 2022-09-03 RX ADMIN — SPIRONOLACTONE 25 MILLIGRAM(S): 25 TABLET, FILM COATED ORAL at 05:58

## 2022-09-03 RX ADMIN — Medication 650 MILLIGRAM(S): at 10:30

## 2022-09-03 RX ADMIN — Medication 650 MILLIGRAM(S): at 09:35

## 2022-09-03 RX ADMIN — Medication 100 GRAM(S): at 01:45

## 2022-09-03 RX ADMIN — Medication 650 MILLIGRAM(S): at 19:00

## 2022-09-03 RX ADMIN — PANTOPRAZOLE SODIUM 40 MILLIGRAM(S): 20 TABLET, DELAYED RELEASE ORAL at 05:59

## 2022-09-03 RX ADMIN — HYDROMORPHONE HYDROCHLORIDE 0.5 MILLIGRAM(S): 2 INJECTION INTRAMUSCULAR; INTRAVENOUS; SUBCUTANEOUS at 01:20

## 2022-09-03 RX ADMIN — HYDROMORPHONE HYDROCHLORIDE 0.5 MILLIGRAM(S): 2 INJECTION INTRAMUSCULAR; INTRAVENOUS; SUBCUTANEOUS at 00:49

## 2022-09-03 RX ADMIN — Medication 25 GRAM(S): at 08:01

## 2022-09-03 RX ADMIN — Medication 20 MILLIEQUIVALENT(S): at 05:58

## 2022-09-03 NOTE — CHART NOTE - NSCHARTNOTEFT_GEN_A_CORE
POST-OPERATIVE NOTE    Patient is s/p L femoral pseudoaneurysm thrombin injection     Subjective:  Patient reports no pain at the incisional site  Denies chest pain, shortness of breath, nausea, vomiting  Patient reports she feels better than the morning    Vital Signs Last 24 Hrs  T(C): 36.7 (03 Sep 2022 16:00), Max: 36.7 (02 Sep 2022 20:00)  T(F): 98.1 (03 Sep 2022 16:00), Max: 98.1 (03 Sep 2022 16:00)  HR: 89 (03 Sep 2022 16:00) (86 - 113)  BP: --  BP(mean): --  RR: 16 (03 Sep 2022 16:00) (12 - 24)  SpO2: 98% (03 Sep 2022 16:00) (91% - 100%)    Parameters below as of 03 Sep 2022 08:00  Patient On (Oxygen Delivery Method): nasal cannula  O2 Flow (L/min): 2      I&O's Detail    02 Sep 2022 07:01  -  03 Sep 2022 07:00  --------------------------------------------------------  IN:    DOBUTamine: 187.2 mL    Heparin: 28 mL    IV PiggyBack: 100 mL    Oral Fluid: 1660 mL  Total IN: 1975.2 mL    OUT:    Voided (mL): 700 mL  Total OUT: 700 mL    Total NET: 1275.2 mL      03 Sep 2022 07:01  -  03 Sep 2022 16:41  --------------------------------------------------------  IN:    DOBUTamine: 70.2 mL    IV PiggyBack: 50 mL    Oral Fluid: 240 mL  Total IN: 360.2 mL    OUT:    Voided (mL): 1300 mL  Total OUT: 1300 mL    Total NET: -939.8 mL          Physical Exam:  General: NAD, resting comfortably in bed  Pulmonary: Nonlabored breathing, no respiratory distress  Extremities: Left palpable dp/pt/fem, nontender around the PCI site, no erythema, fluctuance  Neuro: A/O x 3, CNs II-XII grossly intact, normal sensation, no focal deficits        LABS:                        12.0   14.34 )-----------( 213      ( 03 Sep 2022 12:30 )             37.8     09-03    130<L>  |  93<L>  |  10  ----------------------------<  130<H>  5.2   |  28  |  0.79    Ca    8.8      03 Sep 2022 12:30  Phos  3.7     09-03  Mg     2.4     09-03    TPro  6.9  /  Alb  3.7  /  TBili  0.6  /  DBili  x   /  AST  40  /  ALT  268<H>  /  AlkPhos  60  09-03    PT/INR - ( 03 Sep 2022 12:30 )   PT: 13.3 sec;   INR: 1.14 ratio         PTT - ( 03 Sep 2022 12:30 )  PTT:31.2 sec      Assessment:  The patient is a 42y Female who is now several hours post-op from a L femoral pseudoaneurysm thrombin injection    Plan:  - care by primary team POST-OPERATIVE NOTE    Patient is s/p L femoral pseudoaneurysm thrombin injection     Subjective:  Patient reports no pain at the incisional site  Denies chest pain, shortness of breath, nausea, vomiting  Patient reports she feels better than the morning    Vital Signs Last 24 Hrs  T(C): 36.7 (03 Sep 2022 16:00), Max: 36.7 (02 Sep 2022 20:00)  T(F): 98.1 (03 Sep 2022 16:00), Max: 98.1 (03 Sep 2022 16:00)  HR: 89 (03 Sep 2022 16:00) (86 - 113)  BP: --  BP(mean): --  RR: 16 (03 Sep 2022 16:00) (12 - 24)  SpO2: 98% (03 Sep 2022 16:00) (91% - 100%)    Parameters below as of 03 Sep 2022 08:00  Patient On (Oxygen Delivery Method): nasal cannula  O2 Flow (L/min): 2      I&O's Detail    02 Sep 2022 07:01  -  03 Sep 2022 07:00  --------------------------------------------------------  IN:    DOBUTamine: 187.2 mL    Heparin: 28 mL    IV PiggyBack: 100 mL    Oral Fluid: 1660 mL  Total IN: 1975.2 mL    OUT:    Voided (mL): 700 mL  Total OUT: 700 mL    Total NET: 1275.2 mL      03 Sep 2022 07:01  -  03 Sep 2022 16:41  --------------------------------------------------------  IN:    DOBUTamine: 70.2 mL    IV PiggyBack: 50 mL    Oral Fluid: 240 mL  Total IN: 360.2 mL    OUT:    Voided (mL): 1300 mL  Total OUT: 1300 mL    Total NET: -939.8 mL          Physical Exam:  General: NAD, resting comfortably in bed  Pulmonary: Nonlabored breathing, no respiratory distress  Extremities: Left palpable dp/pt/fem, nontender around the PCI site, no erythema, fluctuance  Neuro: A/O x 3, CNs II-XII grossly intact, normal sensation, no focal deficits        LABS:                        12.0   14.34 )-----------( 213      ( 03 Sep 2022 12:30 )             37.8     09-03    130<L>  |  93<L>  |  10  ----------------------------<  130<H>  5.2   |  28  |  0.79    Ca    8.8      03 Sep 2022 12:30  Phos  3.7     09-03  Mg     2.4     09-03    TPro  6.9  /  Alb  3.7  /  TBili  0.6  /  DBili  x   /  AST  40  /  ALT  268<H>  /  AlkPhos  60  09-03    PT/INR - ( 03 Sep 2022 12:30 )   PT: 13.3 sec;   INR: 1.14 ratio         PTT - ( 03 Sep 2022 12:30 )  PTT:31.2 sec      Assessment:  The patient is a 42y Female who is now several hours post-op from a L femoral pseudoaneurysm thrombin injection    Plan:  - Can restart AC   - repeat duplex tomorrow   - care by primary team      Vascular surgery  [id number]

## 2022-09-03 NOTE — PROGRESS NOTE ADULT - ASSESSMENT
42 yr old female with stated hx significant for PulmHTN class I/VI on Treprostinil therapy, Chronic PE who originally presented with palpitations accompanied by orthopnea/N/V/RUQ abd pain over a 2 day period. Course c/b hypotension secondary to acute on chronic cor pulmonale with end organ dysfx (EDOUARD, transaminitis).    Plan:    #Neuro:  =no active issues  -Neuro checks q 2 hrs and prn for changes  -activity as tolerated  -physical therapy consult when stable  -dilauded 0.5 mg IV prn for pain 7-10/10    #Pulm:  =Asthma, Cor pulmonale, PulmHTN (class I/VI)  -Supplemental O2 prn to maintain SPO2 > 92% (currently 3 liters N/C)  -Ipratropium q 6 hrs and prn for sob/wheezes  -HOB >/= 30 degree angle  -Marcela to 5 ppm as tolerated  -continue with subq Treprostinil therapy with eventual plan for IV  -consider obtaining I.R. consult for tunnel catheter placement   -as SBP improved and stable consider re institution of sildenafil 10mg po qd    #CV:  =resolving acute on chronic cor pulmonale due to pulmHTN, echogenic mass in RA concerning for clot in transit (found to be organized old clot)  -resolved HYPOtn  -dobutamine gtt - currently on 2.5 mcg/kg/min -consider d/c   -TTE 8/31/22 - hyperdynamic LVSF, new echogenic mass in RA, RVE concerning for clot in transit, severe PulmHTN  -CTA chest 8/31/22  re-demonstrated severe dilatation of Pulm arteries, without new P.E.  -Duplex bilat L.E. 9/1/22 - No DVT  -s/p catheter directed thrombus aspiration 8/31/22 (see vascular cardiology note)  -Treprostinil therapy as above  -sildenafil as above  -home med of rivaroxaban 10 mg po qd - currently on hold   -Heparin gtt started 8/31/22 - titrate to PTT of 60 to 99    #GI/:  =resolving RUQ pain, improving transaminitis (most likely due to congestive hepatopathy), resolving EDOUARD (most likely pre-renal)  -received RUQ U.S. 8/30/22 - CBD 7 mm, mild hepatic steatosis  -strict I & O's - keep negative  -lasix therapy prn     #ID:  =resolving sepsis  -Sputum Cx 8/31/22 - mod PMN, rare squamous epi cells, mod gm+ cocci in pairs, few gm- rods  -Blood Culture 8/31/22 - NGTD  -Blood Culture 8/29/22 - NGTD  -MRSA/MSSA 8/29/22 - Detected  -Blood Cultrues 8/29/22 - Staph Lugdunensis  -RVP 8/28/22 - ND  -SARS-CoV-2 8/28/22 - ND   -continue Mupirocin 2% therapy q 12 hrs x5 days (started 8/30/22)  -s/p aztreonam therapy - (8/29/22-9/1/22)    #FEN/ENDO/HEME:  =improving transaminitis, resolving elevate INR  -obtain CMP/Mg++/PO--4/CBC w diff/PT/PTT/INR now and q. a.m.  -Obtain methemoglobin level (Marcela)  -trend LFT's       42 yr old female with stated hx significant for PulmHTN class I/VI on Treprostinil therapy, Chronic PE who originally presented with palpitations accompanied by orthopnea/N/V/RUQ abd pain over a 2 day period. Course c/b hypotension secondary to acute on chronic cor pulmonale with end organ dysfx (EDOUARD, transaminitis).    Plan:    #Neuro:  =no active issues  -Neuro checks q 2 hrs and prn for changes  -activity as tolerated  -physical therapy consult when stable  -dilauded 0.5 mg IV prn for pain 7-10/10    #Pulm:  =Asthma, Cor pulmonale, PulmHTN (class I/VI)  -Supplemental O2 prn to maintain SPO2 > 92% (currently 3 liters N/C)  -Ipratropium q 6 hrs and prn for sob/wheezes  -HOB >/= 30 degree angle  -Marcela titrated off 9/2   -tolerated 10 mg PO dose of Sildenafil 9/2 ; will resume at the higher dose; 10 mg TID   -continue with subq Treprostinil therapy with eventual plan for IV FLOLAN   -s/p R IJTLC 9/2   -c/w aldactone 25 mg PO q d  -will give lasix 20 mg iVP x 1 ; goal net 500 ml negative  ( now 1200 ml +)   -consider obtaining I.R. consult for tunnel catheter placement  -c/w prednisone 10 mg PO q d        #CV:  =resolving acute on chronic cor pulmonale due to pulmHTN, echogenic mass in RA concerning for clot in transit (found to be organized old clot)  -resolved HYPOtn  -dobutamine gtt - currently on 2.5 mcg/kg/min   -c/w aldactone home dose, now x lasix 20 mg IVP x 1 ; goal 500 ml net negative   -TTE 8/31/22 - hyperdynamic LVSF, new echogenic mass in RA, RVE concerning for clot in transit, severe PulmHTN  -CTA chest 8/31/22  re-demonstrated severe dilatation of Pulm arteries, without new P.E.  -Duplex bilat L.E. 9/1/22 - No DVT  -s/p catheter directed thrombus aspiration 8/31/22 (see vascular cardiology note); with partail cloth removal c/b pseudoaneurysm; pending duplex study   -vascular  cards input appreciated    -Treprostinil therapy as above  -sildenafil as above  -home med of rivaroxaban 10 mg po qd - currently on hold   -Heparin gtt started 8/31/22 - on hold 2/2 pseudoaneurysm; pending re-eval      #GI/:  =resolving RUQ pain, improving transaminitis (most likely due to congestive hepatopathy), resolving EDOUARD (most likely pre-renal)  -received RUQ U.S. 8/30/22 - CBD 7 mm, mild hepatic steatosis  -c/w bowel regimen   -strict I & O's - keep 500 ml  negative  -c/w aldactone home dose  and lasix therapy prn     #ID:  =resolving sepsis  -Sputum Cx 8/31/22 - mod PMN, rare squamous epi cells, mod gm+ cocci in pairs, few gm- rods  -Blood Culture 8/31/22 - NGTD  -Blood Culture 8/29/22 - NGTD  -MRSA/MSSA 8/29/22 - Detected  -Blood Cultrues 8/29/22 - Staph Lugdunensis  -RVP 8/28/22 - ND  -SARS-CoV-2 8/28/22 - ND   -continue Mupirocin 2% therapy q 12 hrs x5 days (started 8/30/22)  -s/p aztreonam therapy - (8/29/22-9/1/22)    #FEN/ENDO/HEME:  =improving transaminitis, resolving elevate INR  -obtain CMP/Mg++/PO--4/CBC w diff/PT/PTT/INR  q 12 hrs   -trend LFT's  -holding AC in setting of pseudoaneurysm    PPX  GI ppx ; PPI    DVT ppx ; compression devices        42 yr old female with stated hx significant for PulmHTN class I/VI on Treprostinil therapy, Chronic PE who originally presented with palpitations accompanied by orthopnea/N/V/RUQ abd pain over a 2 day period. Course c/b hypotension secondary to acute on chronic cor pulmonale with end organ dysfx (EDOUARD, transaminitis).    Plan:    #Neuro:  =no active issues  -Neuro checks q 2 hrs and prn for changes  -activity as tolerated  -physical therapy consult when stable  -dilauded 0.5 mg IV prn for pain 7-10/10    #Pulm:  =Asthma, Cor pulmonale, PulmHTN (class I/VI)  -Supplemental O2 prn to maintain SPO2 > 92% (currently 3 liters N/C)  -Ipratropium q 6 hrs and prn for sob/wheezes  -HOB >/= 30 degree angle  -Marcela titrated off 9/2   -tolerated 10 mg PO dose of Sildenafil 9/2 ; will resume at the higher dose; 10 mg TID   -continue with subq Treprostinil therapy with eventual plan for IV FLOLAN   -s/p R IJTLC 9/2   -c/w aldactone 25 mg PO q d  -will give lasix 20 mg iVP x 1 ; goal net 500 ml negative  ( now 1200 ml +)   -consider obtaining I.R. consult for tunnel catheter placement  -c/w prednisone 10 mg PO q d        #CV:  =resolving acute on chronic cor pulmonale due to pulmHTN, echogenic mass in RA concerning for clot in transit (found to be organized old clot)  -resolved HYPOtn  -dobutamine gtt - currently on 2.5 mcg/kg/min   -c/w aldactone home dose, now x lasix 20 mg IVP x 1 ; goal 500 ml net negative   -TTE 8/31/22 - hyperdynamic LVSF, new echogenic mass in RA, RVE concerning for clot in transit, severe PulmHTN  -CTA chest 8/31/22  re-demonstrated severe dilatation of Pulm arteries, without new P.E.  -Duplex bilat L.E. 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 ; post vascular decompression at the bedside now pending re-eval      #GI/:  =resolving RUQ pain, improving transaminitis (most likely due to congestive hepatopathy), resolving EDOUARD (most likely pre-renal)  -received RUQ U.S. 8/30/22 - CBD 7 mm, mild hepatic steatosis  -c/w bowel regimen   -strict I & O's - keep 500 ml  negative  -c/w aldactone home dose  and lasix therapy prn     #ID:  =resolving sepsis  -Sputum Cx 8/31/22 - mod PMN, rare squamous epi cells, mod gm+ cocci in pairs, few gm- rods  -Blood Culture 8/31/22 - NGTD  -Blood Culture 8/29/22 - NGTD  -MRSA/MSSA 8/29/22 - Detected  -Blood Cultrues 8/29/22 - Staph Lugdunensis  -RVP 8/28/22 - ND  -SARS-CoV-2 8/28/22 - ND   -continue Mupirocin 2% therapy q 12 hrs x5 days (started 8/30/22)  -s/p aztreonam therapy - (8/29/22-9/1/22)  -trend WBC  -trend T curve     #FEN/ENDO/HEME:  =improving transaminitis, resolving elevate INR  -obtain CMP/Mg++/PO--4/CBC w diff/PT/PTT/INR  q 12 hrs   -trend LFT's  -holding AC in setting of pseudoaneurysm    PPX  GI ppx ; PPI    DVT ppx ; compression devices        42 yr old female with stated hx significant for PulmHTN class I/VI on Treprostinil therapy, Chronic PE who originally presented with palpitations accompanied by orthopnea/N/V/RUQ abd pain over a 2 day period. Course c/b hypotension secondary to acute on chronic cor pulmonale with end organ dysfx (EDOUARD, transaminitis).    Plan:    #Neuro:  =no active issues  -Neuro checks q 2 hrs and prn for changes  -activity as tolerated  -physical therapy consult when stable  Pain management   -dilauded 0.5 mg IV prn for pain 7-10/10    #Pulm:  =Asthma, Cor pulmonale, PulmHTN (class I/VI)  -Supplemental O2 prn to maintain SPO2 > 92% (currently 3 liters N/C)  -Ipratropium q 6 hrs and prn for sob/wheezes  -HOB >/= 30 degree angle  -Marcela titrated off 9/2   -tolerated 10 mg PO dose of Sildenafil 9/2 ; will resume at the higher dose; 10 mg BID  -continue with subq Treprostinil therapy with eventual plan for IV FLOLAN   -s/p R IJTLC 9/2   -c/w aldactone 25 mg PO q d  -will give lasix 20 mg iVP x 1 ; goal net 500 ml negative  ( now 1200 ml +)   -consider obtaining I.R. consult for tunnel catheter placement  -c/w prednisone 10 mg PO q d        #CV:  =resolving acute on chronic cor pulmonale due to pulmHTN, echogenic mass in RA concerning for clot in transit (found to be organized old clot)  -resolved HYPOtn  -dobutamine gtt - currently on 2.5 mcg/kg/min   -c/w aldactone home dose, now x lasix 20 mg IVP x 1 ; goal slight  net negative   -TTE 8/31/22 - hyperdynamic LVSF, new echogenic mass in RA, RVE concerning for clot in transit, severe PulmHTN  -CTA chest 8/31/22  re-demonstrated severe dilatation of Pulm arteries, without new P.E.  -Duplex bilat L.E. 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 ; post vascular decompression at the bedside now pending re-eval      #GI/:  =resolving RUQ pain, improving transaminitis (most likely due to congestive hepatopathy), resolving EDOUARD (most likely pre-renal)  -received RUQ U.S. 8/30/22 - CBD 7 mm, mild hepatic steatosis  -c/w bowel regimen   -strict I & O's - keep 500 ml  negative  -c/w aldactone home dose  and lasix therapy prn     #ID:  =resolving sepsis  -Sputum Cx 8/31/22 - mod PMN, rare squamous epi cells, mod gm+ cocci in pairs, few gm- rods  -Blood Culture 8/31/22 - NGTD  -Blood Culture 8/29/22 - NGTD  -MRSA/MSSA 8/29/22 - Detected  -Blood Cultrues 8/29/22 - Staph Lugdunensis  -RVP 8/28/22 - ND  -SARS-CoV-2 8/28/22 - ND   -continue Mupirocin 2% therapy q 12 hrs x5 days (started 8/30/22)  -s/p aztreonam therapy - (8/29/22-9/1/22)  -trend WBC  -trend T curve     #FEN/ENDO/HEME:  =improving transaminitis, resolving elevate INR  -obtain CMP/Mg++/PO--4/CBC w diff/PT/PTT/INR  q 12 hrs   -trend LFT's  -holding AC in setting of pseudoaneurysm    PPX  GI ppx ; PPI    DVT ppx ; compression devices        42 yr old female with stated hx significant for PulmHTN class I/VI on Treprostinil therapy, Chronic PE who originally presented with palpitations accompanied by orthopnea/N/V/RUQ abd pain over a 2 day period. Course c/b hypotension secondary to acute on chronic cor pulmonale with end organ dysfx (EDOUARD, transaminitis).    Plan:    #Neuro:  =no active issues  -Neuro checks q 2 hrs and prn for changes  -activity as tolerated  -physical therapy consult when stable  Pain management   -dilauded 0.5 mg IV prn for pain 7-10/10    #Pulm:  =Asthma, Cor pulmonale, PulmHTN (class I/VI)  -Supplemental O2 prn to maintain SPO2 > 92% (currently 3 liters N/C)  -Ipratropium q 6 hrs and prn for sob/wheezes  -HOB >/= 30 degree angle  -pHTN specialist Dr. Yoon on case; input appreciated   -Marcela titrated off 9/2   -tolerated 10 mg PO dose of Sildenafil 9/2 ; will resume at the higher dose; 10 mg BID  -continue with subq Treprostinil therapy with eventual plan for IV FLOLAN   -s/p R IJTLC 9/2   -c/w aldactone 25 mg PO q d  -will give lasix 20 mg iVP x 1 ; goal net 500 ml negative  ( now 1200 ml +)   -consider obtaining I.R. consult for tunnel catheter placement  -c/w prednisone 10 mg PO q d        #CV:  =resolving acute on chronic cor pulmonale due to pulmHTN, echogenic mass in RA concerning for clot in transit (found to be organized old clot)  -resolved HYPOtn  -dobutamine gtt - currently on 2.5 mcg/kg/min   -c/w aldactone home dose, now x lasix 20 mg IVP x 1 ; goal slight  net negative   -TTE 8/31/22 - hyperdynamic LVSF, new echogenic mass in RA, RVE concerning for clot in transit, severe PulmHTN  -CTA chest 8/31/22  re-demonstrated severe dilatation of Pulm arteries, without new P.E.  -Duplex bilat L.E. 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 ; post vascular decompression at the bedside now pending re-eval      #GI/:  =resolving RUQ pain, improving transaminitis (most likely due to congestive hepatopathy), resolving EDOUARD (most likely pre-renal)  -received RUQ U.S. 8/30/22 - CBD 7 mm, mild hepatic steatosis  -c/w bowel regimen   -strict I & O's - keep 500 ml  negative  -c/w aldactone home dose  and lasix therapy prn     #ID:  =resolving sepsis  -Sputum Cx 8/31/22 - mod PMN, rare squamous epi cells, mod gm+ cocci in pairs, few gm- rods  -Blood Culture 8/31/22 - NGTD  -Blood Culture 8/29/22 - NGTD  -MRSA/MSSA 8/29/22 - Detected  -Blood Cultrues 8/29/22 - Staph Lugdunensis  -RVP 8/28/22 - ND  -SARS-CoV-2 8/28/22 - ND   -continue Mupirocin 2% therapy q 12 hrs x5 days (started 8/30/22)  -s/p aztreonam therapy - (8/29/22-9/1/22)  -trend WBC  -trend T curve     #FEN/ENDO/HEME:  =improving transaminitis, resolving elevate INR  -obtain CMP/Mg++/PO--4/CBC w diff/PT/PTT/INR  q 12 hrs   -trend LFT's  -holding AC in setting of pseudoaneurysm    PPX  GI ppx ; PPI    DVT ppx ; compression devices

## 2022-09-03 NOTE — PROGRESS NOTE ADULT - SUBJECTIVE AND OBJECTIVE BOX
INTERVAL HPI/OVERNIGHT EVENTS:    Patient is a 42y old  Female who presents with a chief complaint of Palpitations (02 Sep 2022 18:29)    42 yr old female with PMHx of PulmHTN class I/VI (dx 2014) on continuous Treprostinil (Remodulin) (sildenafil - currently on hold) c/b Rt heart failure s/p PPM (Dual chamber 2016), Chronic P.E. on rivaroxaban (dx 2017), SVT s/p ablation (5/2020), Asthma (chronic steroid use - prednisone), Fe deficiency anemia who originally admitted to medicine floor  8/28/22 with c/o of palpitations accompanied by orthopnea/N/V/RUQ abd pain over a 2 day period.  This short hospital course c/b hypotension with SBP 80/41 accompanied by chest pain, transaminitis, EDOUARD with peak sCr 3.05 (now resolved, baseline sCr of .90-1.24) requiring transfer to MICU evening 8/28/22 for hypotension in setting of acute on chronic cor pulmonale/pulmHTN with end organ dysfunction (EDOUARD, transaminitis). Pt placed on dobutamine/diuretic/Marcela therapies. Course further c/b TTE (8/31/22) finding of new echogenic mass in RA concerning for clot in transit, CTA chest (8/31/22) re-demonstrated severe dilatation of Pulm arteries, without new P.E., received catheter - directed thrombus aspiration (see note interventional Cards 8/31/22)    ROS:  CONSTITUTIONAL:  Negative fever or chills, feels well, good appetite  EYES:  Negative  blurry vision or double vision  CARDIOVASCULAR:  Negative for chest pain or palpitations  RESPIRATORY:  Negative for cough, wheezing, or SOB   GASTROINTESTINAL:  Negative for nausea, vomiting, diarrhea, constipation, or abdominal pain  GENITOURINARY:  Negative frequency, urgency or dysuria  NEUROLOGIC:  No headache, confusion, dizziness, lightheadedness    Allergies    adhesives (Rash)  penicillin (Rash)  penicillin (Unknown)  vancomycin (Rash)  vancomycin (Unknown)    Intolerances    albuterol (Other; Rash)      ANTIBIOTICS/RELEVANT:  antimicrobials    immunologic:    OTHER:  aluminum hydroxide/magnesium hydroxide/simethicone Suspension 30 milliLiter(s) Oral every 4 hours PRN  artificial  tears Solution 1 Drop(s) Both EYES three times a day PRN  chlorhexidine 4% Liquid 1 Application(s) Topical <User Schedule>  cyanocobalamin 1000 MICROGram(s) Oral daily  DOBUTamine Infusion 2.5 MICROgram(s)/kG/Min IV Continuous <Continuous>  HYDROmorphone  Injectable 0.5 milliGRAM(s) IV Push every 4 hours PRN  ipratropium    for Nebulization 500 MICROGram(s) Nebulizer every 6 hours PRN  melatonin 3 milliGRAM(s) Oral at bedtime PRN  mupirocin 2% Nasal 1 Application(s) Both Nostrils two times a day  ondansetron Injectable 4 milliGRAM(s) IV Push every 8 hours PRN  pantoprazole    Tablet 40 milliGRAM(s) Oral before breakfast  polyethylene glycol 3350 17 Gram(s) Oral daily  predniSONE   Tablet 10 milliGRAM(s) Oral daily  Remodulin (Treprostinil) 5mG/mL 1 Vial(s) 1 Vial(s) SubCutaneous Continuous Pump  senna 2 Tablet(s) Oral at bedtime  sodium chloride 0.65% Nasal 1 Spray(s) Both Nostrils every 6 hours PRN  sodium chloride 0.9% lock flush 10 milliLiter(s) IV Push every 1 hour PRN  spironolactone 25 milliGRAM(s) Oral daily      Objective:  Vital Signs Last 24 Hrs  T(C): 36.7 (02 Sep 2022 20:00), Max: 36.7 (02 Sep 2022 20:00)  T(F): 98 (02 Sep 2022 20:00), Max: 98 (02 Sep 2022 20:00)  HR: 101 (03 Sep 2022 06:00) (93 - 110)  BP: 108/72 (02 Sep 2022 12:51) (98/64 - 117/66)  BP(mean): 85 (02 Sep 2022 12:51) (75 - 85)  RR: 20 (03 Sep 2022 06:00) (12 - 24)  SpO2: 100% (03 Sep 2022 06:00) (91% - 100%)    Parameters below as of 03 Sep 2022 04:00  Patient On (Oxygen Delivery Method): nasal cannula  O2 Flow (L/min): 2      PHYSICAL EXAM:  General -   HEENT -   CV -   Resp -   Abdomen -   Extremities -   Skin -       LABS:                        11.3   11.14 )-----------( 185      ( 03 Sep 2022 00:27 )             36.3     09-03    133<L>  |  97  |  10  ----------------------------<  124<H>  3.8   |  27  |  0.86    Ca    8.4      03 Sep 2022 00:26  Phos  3.6     09-03  Mg     1.8     09-03    TPro  6.3  /  Alb  3.2<L>  /  TBili  0.6  /  DBili  x   /  AST  44<H>  /  ALT  294<H>  /  AlkPhos  58  09-03    PT/INR - ( 03 Sep 2022 00:27 )   PT: 13.5 sec;   INR: 1.17 ratio         PTT - ( 03 Sep 2022 00:27 )  PTT:30.2 sec        MICROBIOLOGY:        RADIOLOGY & ADDITIONAL STUDIES: INTERVAL HPI/OVERNIGHT EVENTS:    Patient is a 42y old  Female who presents with a chief complaint of Palpitations (02 Sep 2022 18:29)    42 yr old female with PMHx of PulmHTN class I/VI (dx 2014) on continuous Treprostinil (Remodulin) (sildenafil - currently on hold) c/b Rt heart failure s/p PPM (Dual chamber 2016), Chronic P.E. on rivaroxaban (dx 2017), SVT s/p ablation (5/2020), Asthma (chronic steroid use - prednisone), Fe deficiency anemia who originally admitted to medicine floor  8/28/22 with c/o of palpitations accompanied by orthopnea/N/V/RUQ abd pain over a 2 day period.  This short hospital course c/b hypotension with SBP 80/41 accompanied by chest pain, transaminitis, EDOUARD with peak sCr 3.05 (now resolved, baseline sCr of .90-1.24) requiring transfer to MICU evening 8/28/22 for hypotension in setting of acute on chronic cor pulmonale/pulmHTN with end organ dysfunction (EDOUARD, transaminitis). Pt placed on dobutamine/diuretic/Marcela therapies. Course further c/b TTE (8/31/22) finding of new echogenic mass in RA concerning for clot in transit, CTA chest (8/31/22) re-demonstrated severe dilatation of Pulm arteries, without new P.E., received catheter - directed thrombus aspiration (see note interventional Cards 8/31/22)    ROS:  CONSTITUTIONAL:  Negative fever or chills, feels well, good appetite  EYES:  Negative  blurry vision or double vision  CARDIOVASCULAR:  Negative for chest pain or palpitations  RESPIRATORY:  Negative for cough, wheezing, or SOB   GASTROINTESTINAL:  Negative for nausea, vomiting, diarrhea, constipation, or abdominal pain  GENITOURINARY:  Negative frequency, urgency or dysuria  NEUROLOGIC:  No headache, confusion, dizziness, lightheadedness    Allergies    adhesives (Rash)  penicillin (Rash)  penicillin (Unknown)  vancomycin (Rash)  vancomycin (Unknown)    Intolerances    albuterol (Other; Rash)      ANTIBIOTICS/RELEVANT:  antimicrobials    immunologic:    OTHER:  aluminum hydroxide/magnesium hydroxide/simethicone Suspension 30 milliLiter(s) Oral every 4 hours PRN  artificial  tears Solution 1 Drop(s) Both EYES three times a day PRN  chlorhexidine 4% Liquid 1 Application(s) Topical <User Schedule>  cyanocobalamin 1000 MICROGram(s) Oral daily  DOBUTamine Infusion 2.5 MICROgram(s)/kG/Min IV Continuous <Continuous>  HYDROmorphone  Injectable 0.5 milliGRAM(s) IV Push every 4 hours PRN  ipratropium    for Nebulization 500 MICROGram(s) Nebulizer every 6 hours PRN  melatonin 3 milliGRAM(s) Oral at bedtime PRN  mupirocin 2% Nasal 1 Application(s) Both Nostrils two times a day  ondansetron Injectable 4 milliGRAM(s) IV Push every 8 hours PRN  pantoprazole    Tablet 40 milliGRAM(s) Oral before breakfast  polyethylene glycol 3350 17 Gram(s) Oral daily  predniSONE   Tablet 10 milliGRAM(s) Oral daily  Remodulin (Treprostinil) 5mG/mL 1 Vial(s) 1 Vial(s) SubCutaneous Continuous Pump  senna 2 Tablet(s) Oral at bedtime  sodium chloride 0.65% Nasal 1 Spray(s) Both Nostrils every 6 hours PRN  sodium chloride 0.9% lock flush 10 milliLiter(s) IV Push every 1 hour PRN  spironolactone 25 milliGRAM(s) Oral daily      Objective:  Vital Signs Last 24 Hrs  T(C): 36.7 (02 Sep 2022 20:00), Max: 36.7 (02 Sep 2022 20:00)  T(F): 98 (02 Sep 2022 20:00), Max: 98 (02 Sep 2022 20:00)  HR: 101 (03 Sep 2022 06:00) (93 - 110)  BP: 108/72 (02 Sep 2022 12:51) (98/64 - 117/66)  BP(mean): 85 (02 Sep 2022 12:51) (75 - 85)  RR: 20 (03 Sep 2022 06:00) (12 - 24)  SpO2: 100% (03 Sep 2022 06:00) (91% - 100%)    Parameters below as of 03 Sep 2022 04:00  Patient On (Oxygen Delivery Method): nasal cannula  O2 Flow (L/min): 2      PHYSICAL EXAM:  General -   HEENT -   CV -   Resp -   Abdomen -   Extremities -   Skin -       LABS:                        11.3   11.14 )-----------( 185      ( 03 Sep 2022 00:27 )             36.3     09-03    133<L>  |  97  |  10  ----------------------------<  124<H>  3.8   |  27  |  0.86    Ca    8.4      03 Sep 2022 00:26  Phos  3.6     09-03  Mg     1.8     09-03    TPro  6.3  /  Alb  3.2<L>  /  TBili  0.6  /  DBili  x   /  AST  44<H>  /  ALT  294<H>  /  AlkPhos  58  09-03    PT/INR - ( 03 Sep 2022 00:27 )   PT: 13.5 sec;   INR: 1.17 ratio         PTT - ( 03 Sep 2022 00:27 )  PTT:30.2 sec        MICROBIOLOGY:  Culture - Sputum . (08.31.22 @ 20:45)    Gram Stain:   Moderate polymorphonuclear leukocytes per low power field  Rare Squamous epithelial cells per low power field  Moderate Gram positive cocci in pairs seen per oil power field  Few Gram Negative Rods seen per oil power field    Specimen Source: .Sputum Sputum    Culture Results:   Normal Respiratory Laura present    Culture - Blood (08.31.22 @ 00:12)    Specimen Source: .Blood Blood-Peripheral    Culture Results:   No growth to date.    Culture - Blood (08.31.22 @ 00:12)    Specimen Source: .Blood Blood-Peripheral    Culture Results:   No growth to date.          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 INTERVAL HPI/OVERNIGHT EVENTS:    Patient is a 42y old  Female who presents with a chief complaint of Palpitations (02 Sep 2022 18:29)    42 yr old female with PMHx of PulmHTN class I/VI (dx 2014) on continuous Treprostinil (Remodulin) (sildenafil - currently on hold) c/b Rt heart failure s/p PPM (Dual chamber 2016), Chronic P.E. on rivaroxaban (dx 2017), SVT s/p ablation (5/2020), Asthma (chronic steroid use - prednisone), Fe deficiency anemia who originally admitted to medicine floor  8/28/22 with c/o of palpitations accompanied by orthopnea/N/V/RUQ abd pain over a 2 day period.  This short hospital course c/b hypotension with SBP 80/41 accompanied by chest pain, transaminitis, EDOUARD with peak sCr 3.05 (now resolved, baseline sCr of .90-1.24) requiring transfer to MICU evening 8/28/22 for hypotension in setting of acute on chronic cor pulmonale/pulmHTN with end organ dysfunction (EDOUARD, transaminitis). Pt placed on dobutamine/diuretic/Marcela therapies. Course further c/b TTE (8/31/22) finding of new echogenic mass in RA concerning for clot in transit, CTA chest (8/31/22) re-demonstrated severe dilatation of Pulm arteries, without new P.E., received catheter - directed thrombus aspiration (see note interventional Cards 8/31/22)    ROS:  CONSTITUTIONAL:  + HA. Negative fever or chills, feels well, good appetite  EYES:  Negative  blurry vision or double vision  CARDIOVASCULAR:  Negative for chest pain or palpitations  RESPIRATORY:  Negative for cough, wheezing, or SOB   GASTROINTESTINAL:  Negative for nausea, vomiting, diarrhea, constipation, or abdominal pain  GENITOURINARY:  Negative frequency, urgency or dysuria  NEUROLOGIC:  No headache, confusion, dizziness, lightheadedness    Allergies    adhesives (Rash)  penicillin (Rash)  penicillin (Unknown)  vancomycin (Rash)  vancomycin (Unknown)    Intolerances    albuterol (Other; Rash)      ANTIBIOTICS/RELEVANT:  antimicrobials    immunologic:    OTHER:  aluminum hydroxide/magnesium hydroxide/simethicone Suspension 30 milliLiter(s) Oral every 4 hours PRN  artificial  tears Solution 1 Drop(s) Both EYES three times a day PRN  chlorhexidine 4% Liquid 1 Application(s) Topical <User Schedule>  cyanocobalamin 1000 MICROGram(s) Oral daily  DOBUTamine Infusion 2.5 MICROgram(s)/kG/Min IV Continuous <Continuous>  HYDROmorphone  Injectable 0.5 milliGRAM(s) IV Push every 4 hours PRN  ipratropium    for Nebulization 500 MICROGram(s) Nebulizer every 6 hours PRN  melatonin 3 milliGRAM(s) Oral at bedtime PRN  mupirocin 2% Nasal 1 Application(s) Both Nostrils two times a day  ondansetron Injectable 4 milliGRAM(s) IV Push every 8 hours PRN  pantoprazole    Tablet 40 milliGRAM(s) Oral before breakfast  polyethylene glycol 3350 17 Gram(s) Oral daily  predniSONE   Tablet 10 milliGRAM(s) Oral daily  Remodulin (Treprostinil) 5mG/mL 1 Vial(s) 1 Vial(s) SubCutaneous Continuous Pump  senna 2 Tablet(s) Oral at bedtime  sodium chloride 0.65% Nasal 1 Spray(s) Both Nostrils every 6 hours PRN  sodium chloride 0.9% lock flush 10 milliLiter(s) IV Push every 1 hour PRN  spironolactone 25 milliGRAM(s) Oral daily      Objective:  Vital Signs Last 24 Hrs  T(C): 36.7 (02 Sep 2022 20:00), Max: 36.7 (02 Sep 2022 20:00)  T(F): 98 (02 Sep 2022 20:00), Max: 98 (02 Sep 2022 20:00)  HR: 101 (03 Sep 2022 06:00) (93 - 110)  BP: 108/72 (02 Sep 2022 12:51) (98/64 - 117/66)  BP(mean): 85 (02 Sep 2022 12:51) (75 - 85)  RR: 20 (03 Sep 2022 06:00) (12 - 24)  SpO2: 100% (03 Sep 2022 06:00) (91% - 100%)    Parameters below as of 03 Sep 2022 04:00  Patient On (Oxygen Delivery Method): nasal cannula  O2 Flow (L/min): 2      PHYSICAL EXAM:  General - AOx 4   HEENT - moist mucosal membranes   CV - s1 & s2   Resp - CTA b/l   Abdomen - soft ,NT, ND. BS x 4 quadrants . R groin with dry dressing. L groin no dressing. External  cath collecting yellow urine.   Extremities - intact pulses b/l radial , PT/DT. No edema   Skin - warm, dry       LABS:                        11.3   11.14 )-----------( 185      ( 03 Sep 2022 00:27 )             36.3     09-03    133<L>  |  97  |  10  ----------------------------<  124<H>  3.8   |  27  |  0.86    Ca    8.4      03 Sep 2022 00:26  Phos  3.6     09-03  Mg     1.8     09-03    TPro  6.3  /  Alb  3.2<L>  /  TBili  0.6  /  DBili  x   /  AST  44<H>  /  ALT  294<H>  /  AlkPhos  58  09-03    PT/INR - ( 03 Sep 2022 00:27 )   PT: 13.5 sec;   INR: 1.17 ratio         PTT - ( 03 Sep 2022 00:27 )  PTT:30.2 sec        MICROBIOLOGY:  Culture - Sputum . (08.31.22 @ 20:45)    Gram Stain:   Moderate polymorphonuclear leukocytes per low power field  Rare Squamous epithelial cells per low power field  Moderate Gram positive cocci in pairs seen per oil power field  Few Gram Negative Rods seen per oil power field    Specimen Source: .Sputum Sputum    Culture Results:   Normal Respiratory Laura present    Culture - Blood (08.31.22 @ 00:12)    Specimen Source: .Blood Blood-Peripheral    Culture Results:   No growth to date.    Culture - Blood (08.31.22 @ 00:12)    Specimen Source: .Blood Blood-Peripheral    Culture Results:   No growth to date.          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

## 2022-09-04 LAB
ALBUMIN SERPL ELPH-MCNC: 3.7 G/DL — SIGNIFICANT CHANGE UP (ref 3.3–5)
ALBUMIN SERPL ELPH-MCNC: 4.1 G/DL — SIGNIFICANT CHANGE UP (ref 3.3–5)
ALP SERPL-CCNC: 59 U/L — SIGNIFICANT CHANGE UP (ref 40–120)
ALP SERPL-CCNC: 64 U/L — SIGNIFICANT CHANGE UP (ref 40–120)
ALT FLD-CCNC: 212 U/L — HIGH (ref 10–45)
ALT FLD-CCNC: 222 U/L — HIGH (ref 10–45)
ANION GAP SERPL CALC-SCNC: 10 MMOL/L — SIGNIFICANT CHANGE UP (ref 5–17)
ANION GAP SERPL CALC-SCNC: 10 MMOL/L — SIGNIFICANT CHANGE UP (ref 5–17)
APTT BLD: 75.1 SEC — HIGH (ref 27.5–35.5)
APTT BLD: 91.8 SEC — HIGH (ref 27.5–35.5)
APTT BLD: 96.2 SEC — HIGH (ref 27.5–35.5)
AST SERPL-CCNC: 35 U/L — SIGNIFICANT CHANGE UP (ref 10–40)
AST SERPL-CCNC: 49 U/L — HIGH (ref 10–40)
BILIRUB SERPL-MCNC: 0.6 MG/DL — SIGNIFICANT CHANGE UP (ref 0.2–1.2)
BILIRUB SERPL-MCNC: 0.8 MG/DL — SIGNIFICANT CHANGE UP (ref 0.2–1.2)
BUN SERPL-MCNC: 10 MG/DL — SIGNIFICANT CHANGE UP (ref 7–23)
BUN SERPL-MCNC: 10 MG/DL — SIGNIFICANT CHANGE UP (ref 7–23)
CALCIUM SERPL-MCNC: 8.7 MG/DL — SIGNIFICANT CHANGE UP (ref 8.4–10.5)
CALCIUM SERPL-MCNC: 8.8 MG/DL — SIGNIFICANT CHANGE UP (ref 8.4–10.5)
CHLORIDE SERPL-SCNC: 95 MMOL/L — LOW (ref 96–108)
CHLORIDE SERPL-SCNC: 96 MMOL/L — SIGNIFICANT CHANGE UP (ref 96–108)
CO2 SERPL-SCNC: 24 MMOL/L — SIGNIFICANT CHANGE UP (ref 22–31)
CO2 SERPL-SCNC: 28 MMOL/L — SIGNIFICANT CHANGE UP (ref 22–31)
CREAT SERPL-MCNC: 0.71 MG/DL — SIGNIFICANT CHANGE UP (ref 0.5–1.3)
CREAT SERPL-MCNC: 0.8 MG/DL — SIGNIFICANT CHANGE UP (ref 0.5–1.3)
CULTURE RESULTS: SIGNIFICANT CHANGE UP
EGFR: 109 ML/MIN/1.73M2 — SIGNIFICANT CHANGE UP
EGFR: 94 ML/MIN/1.73M2 — SIGNIFICANT CHANGE UP
GAS PNL BLDA: SIGNIFICANT CHANGE UP
GLUCOSE SERPL-MCNC: 128 MG/DL — HIGH (ref 70–99)
GLUCOSE SERPL-MCNC: 97 MG/DL — SIGNIFICANT CHANGE UP (ref 70–99)
HCT VFR BLD CALC: 37.3 % — SIGNIFICANT CHANGE UP (ref 34.5–45)
HCT VFR BLD CALC: 38.5 % — SIGNIFICANT CHANGE UP (ref 34.5–45)
HCT VFR BLD CALC: 39.2 % — SIGNIFICANT CHANGE UP (ref 34.5–45)
HGB BLD-MCNC: 11.8 G/DL — SIGNIFICANT CHANGE UP (ref 11.5–15.5)
HGB BLD-MCNC: 12.1 G/DL — SIGNIFICANT CHANGE UP (ref 11.5–15.5)
HGB BLD-MCNC: 12.3 G/DL — SIGNIFICANT CHANGE UP (ref 11.5–15.5)
INR BLD: 1.15 RATIO — SIGNIFICANT CHANGE UP (ref 0.88–1.16)
INR BLD: 1.16 RATIO — SIGNIFICANT CHANGE UP (ref 0.88–1.16)
MAGNESIUM SERPL-MCNC: 2 MG/DL — SIGNIFICANT CHANGE UP (ref 1.6–2.6)
MAGNESIUM SERPL-MCNC: 2.1 MG/DL — SIGNIFICANT CHANGE UP (ref 1.6–2.6)
MCHC RBC-ENTMCNC: 22.2 PG — LOW (ref 27–34)
MCHC RBC-ENTMCNC: 22.3 PG — LOW (ref 27–34)
MCHC RBC-ENTMCNC: 22.5 PG — LOW (ref 27–34)
MCHC RBC-ENTMCNC: 31.4 GM/DL — LOW (ref 32–36)
MCHC RBC-ENTMCNC: 31.4 GM/DL — LOW (ref 32–36)
MCHC RBC-ENTMCNC: 31.6 GM/DL — LOW (ref 32–36)
MCV RBC AUTO: 70.6 FL — LOW (ref 80–100)
MCV RBC AUTO: 70.9 FL — LOW (ref 80–100)
MCV RBC AUTO: 71 FL — LOW (ref 80–100)
NRBC # BLD: 0 /100 WBCS — SIGNIFICANT CHANGE UP (ref 0–0)
PHOSPHATE SERPL-MCNC: 3.1 MG/DL — SIGNIFICANT CHANGE UP (ref 2.5–4.5)
PHOSPHATE SERPL-MCNC: 4 MG/DL — SIGNIFICANT CHANGE UP (ref 2.5–4.5)
PLATELET # BLD AUTO: 219 K/UL — SIGNIFICANT CHANGE UP (ref 150–400)
PLATELET # BLD AUTO: 225 K/UL — SIGNIFICANT CHANGE UP (ref 150–400)
PLATELET # BLD AUTO: 265 K/UL — SIGNIFICANT CHANGE UP (ref 150–400)
POTASSIUM SERPL-MCNC: 4.1 MMOL/L — SIGNIFICANT CHANGE UP (ref 3.5–5.3)
POTASSIUM SERPL-MCNC: 5.2 MMOL/L — SIGNIFICANT CHANGE UP (ref 3.5–5.3)
POTASSIUM SERPL-SCNC: 4.1 MMOL/L — SIGNIFICANT CHANGE UP (ref 3.5–5.3)
POTASSIUM SERPL-SCNC: 5.2 MMOL/L — SIGNIFICANT CHANGE UP (ref 3.5–5.3)
PROT SERPL-MCNC: 6.9 G/DL — SIGNIFICANT CHANGE UP (ref 6–8.3)
PROT SERPL-MCNC: 7.3 G/DL — SIGNIFICANT CHANGE UP (ref 6–8.3)
PROTHROM AB SERPL-ACNC: 13.3 SEC — SIGNIFICANT CHANGE UP (ref 10.5–13.4)
PROTHROM AB SERPL-ACNC: 13.5 SEC — HIGH (ref 10.5–13.4)
RBC # BLD: 5.25 M/UL — HIGH (ref 3.8–5.2)
RBC # BLD: 5.43 M/UL — HIGH (ref 3.8–5.2)
RBC # BLD: 5.55 M/UL — HIGH (ref 3.8–5.2)
RBC # FLD: 17.3 % — HIGH (ref 10.3–14.5)
RBC # FLD: 18 % — HIGH (ref 10.3–14.5)
RBC # FLD: 18.2 % — HIGH (ref 10.3–14.5)
SARS-COV-2 RNA SPEC QL NAA+PROBE: SIGNIFICANT CHANGE UP
SODIUM SERPL-SCNC: 130 MMOL/L — LOW (ref 135–145)
SODIUM SERPL-SCNC: 133 MMOL/L — LOW (ref 135–145)
SPECIMEN SOURCE: SIGNIFICANT CHANGE UP
WBC # BLD: 12.47 K/UL — HIGH (ref 3.8–10.5)
WBC # BLD: 12.81 K/UL — HIGH (ref 3.8–10.5)
WBC # BLD: 14.08 K/UL — HIGH (ref 3.8–10.5)
WBC # FLD AUTO: 12.47 K/UL — HIGH (ref 3.8–10.5)
WBC # FLD AUTO: 12.81 K/UL — HIGH (ref 3.8–10.5)
WBC # FLD AUTO: 14.08 K/UL — HIGH (ref 3.8–10.5)

## 2022-09-04 PROCEDURE — 99291 CRITICAL CARE FIRST HOUR: CPT | Mod: GC

## 2022-09-04 PROCEDURE — 93926 LOWER EXTREMITY STUDY: CPT | Mod: 26,LT

## 2022-09-04 RX ADMIN — SPIRONOLACTONE 25 MILLIGRAM(S): 25 TABLET, FILM COATED ORAL at 06:06

## 2022-09-04 RX ADMIN — Medication 10 MILLIGRAM(S): at 07:29

## 2022-09-04 RX ADMIN — CHLORHEXIDINE GLUCONATE 1 APPLICATION(S): 213 SOLUTION TOPICAL at 06:08

## 2022-09-04 RX ADMIN — Medication 650 MILLIGRAM(S): at 11:17

## 2022-09-04 RX ADMIN — Medication 10 MILLIGRAM(S): at 06:07

## 2022-09-04 RX ADMIN — Medication 500 MICROGRAM(S): at 21:35

## 2022-09-04 RX ADMIN — PREGABALIN 1000 MICROGRAM(S): 225 CAPSULE ORAL at 11:17

## 2022-09-04 RX ADMIN — Medication 650 MILLIGRAM(S): at 03:45

## 2022-09-04 RX ADMIN — HEPARIN SODIUM 14 UNIT(S)/HR: 5000 INJECTION INTRAVENOUS; SUBCUTANEOUS at 10:40

## 2022-09-04 RX ADMIN — Medication 650 MILLIGRAM(S): at 03:00

## 2022-09-04 RX ADMIN — Medication 650 MILLIGRAM(S): at 12:14

## 2022-09-04 RX ADMIN — PANTOPRAZOLE SODIUM 40 MILLIGRAM(S): 20 TABLET, DELAYED RELEASE ORAL at 06:07

## 2022-09-04 RX ADMIN — Medication 7.82 MICROGRAM(S)/KG/MIN: at 10:40

## 2022-09-04 RX ADMIN — MUPIROCIN 1 APPLICATION(S): 20 OINTMENT TOPICAL at 06:08

## 2022-09-04 RX ADMIN — Medication 10 MILLIGRAM(S): at 14:21

## 2022-09-04 RX ADMIN — Medication 10 MILLIGRAM(S): at 21:34

## 2022-09-04 RX ADMIN — SENNA PLUS 2 TABLET(S): 8.6 TABLET ORAL at 21:30

## 2022-09-04 NOTE — PROGRESS NOTE ADULT - ASSESSMENT
42 yr old female with stated hx significant for PulmHTN class I/VI on Treprostinil therapy, Chronic PE who originally presented with palpitations accompanied by orthopnea/N/V/RUQ abd pain over a 2 day period. Course c/b hypotension secondary to acute on chronic cor pulmonale with end organ dysfx (EDOUARD, transaminitis).    Plan:    #Neuro:  =no active issues  -Neuro checks q 2 hrs and prn for changes  -activity as tolerated  -physical therapy       #Pulm:  =Asthma, Cor pulmonale, PulmHTN (class I/VI)  -Supplemental O2 prn to maintain SPO2 > 92% (currently 2 liters N/C); able to wean off to RA ; o2sat >94%   -Ipratropium q 6 hrs and prn for sob/wheezes  -HOB >/= 30 degree angle  -pHTN specialist Dr. Yoon on case; input appreciated   -Marcela titrated off 9/2   -Sildenafil 10 mg BID 9/3  -continue with subq Treprostinil therapy with eventual plan for IV FLOLAN ; eval 9/6   -s/p R IJTLC 9/2   -c/w aldactone 25 mg PO q d; goal net 500 ml negative  (-800 ml )   -consider obtaining I.R. consult for tunnel catheter placement  -c/w home dose  prednisone 10 mg PO q d        #CV:  =resolving acute on chronic cor pulmonale due to pulmHTN, echogenic mass in RA concerning for clot in transit (found to be organized old clot)  -dobutamine gtt - currently on 2.5 mcg/kg/min ; ( SBP . HR )    -c/w aldactone home dose, goal  net negative   -TTE 8/31/22 - hyperdynamic LVSF, new echogenic mass in RA, RVE concerning for clot in transit, severe PulmHTN  -CTA chest 8/31/22  re-demonstrated severe dilatation of Pulm arteries, without new P.E.  -Duplex bilat L.E. 9/1/22 - No DVT  -s/p catheter directed thrombus aspiration 8/31/22 (see vascular cardiology note); with partial cloth removal c/b pseudoaneurysm; s/p thrombin injection 9/3 ; repeat duplex L groin pending   -vascular  cards input appreciated    -Treprostinil therapy as above  -sildenafil as above  -home med of rivaroxaban 10 mg po qd - currently on hold   -Heparin gtt resumed 9/3 ON     #GI/:  =resolving RUQ pain, improving transaminitis (most likely due to congestive hepatopathy), resolving EDOUARD (most likely pre-renal)  -received RUQ U.S. 8/30/22 - CBD 7 mm, mild hepatic steatosis  -c/w bowel regimen   -strict I & O's - keep 500 ml  negative  -c/w aldactone home dose  and lasix therapy prn     #ID:  =resolving sepsis  -Sputum Cx 8/31/22 - mod PMN, rare squamous epi cells, mod gm+ cocci in pairs, few gm- rods  -Blood Culture 8/31/22 - NGTD  -Blood Culture 8/29/22 - NGTD  -MRSA/MSSA 8/29/22 - Detected  -Blood Cultrues 8/29/22 - Staph Lugdunensis  -RVP 8/28/22 - ND  -SARS-CoV-2 8/28/22 - ND   -continue Mupirocin 2% therapy q 12 hrs x5 days (started 8/30/22)  -s/p aztreonam therapy - (8/29/22-9/1/22)  -trend WBC  -trend T curve     #FEN/ENDO/HEME:  =improving transaminitis, resolving elevate INR  -obtain CMP/Mg++/PO--4/CBC w diff/PT/PTT/INR  q 12 hrs   -trend LFT's  -heparin gtt resumed ; pt with some bloody mucus when forcefully blowing nose ; c/w nasal moister / ocean spray / education / supplemental o2 with humidification     PPX  GI ppx ; PPI    DVT ppx ; heparin gtt      42 yr old female with stated hx significant for PulmHTN class I/VI on Treprostinil therapy, Chronic PE who originally presented with palpitations accompanied by orthopnea/N/V/RUQ abd pain over a 2 day period. Course c/b hypotension secondary to acute on chronic cor pulmonale with end organ dysfx (EDOUARD, transaminitis).    Plan:    #Neuro:  =no active issues  -Neuro checks q 2 hrs and prn for changes  -activity as tolerated  -physical therapy       #Pulm:  =Asthma, Cor pulmonale, PulmHTN (class I/VI)  -Supplemental O2 prn to maintain SPO2 > 92% (currently 2 liters N/C); able to wean off to RA ; o2sat >94%   -Ipratropium q 6 hrs and prn for sob/wheezes  -HOB >/= 30 degree angle  -pHTN specialist Dr. Yoon on case; input appreciated   -Marcela titrated off 9/2   -Sildenafil 10 mg BID 9/3  -continue with subq Treprostinil therapy with eventual plan for IV FLOLAN ; eval 9/6   -s/p R IJTLC 9/2   -c/w aldactone 25 mg PO q d; goal net 500 ml negative  (-800 ml )   -consider obtaining I.R. consult for tunnel catheter placement  -c/w home dose  prednisone 10 mg PO q d        #CV:  =resolving acute on chronic cor pulmonale due to pulmHTN, echogenic mass in RA concerning for clot in transit (found to be organized old clot)  -dobutamine gtt - currently on 2.5 mcg/kg/min ; ( SBP . HR )    -c/w aldactone home dose, goal  net negative   -TTE 8/31/22 - hyperdynamic LVSF, new echogenic mass in RA, RVE concerning for clot in transit, severe PulmHTN  -CTA chest 8/31/22  re-demonstrated severe dilatation of Pulm arteries, without new P.E.  -Duplex bilat L.E. 9/1/22 - No DVT  -s/p catheter directed thrombus aspiration 8/31/22 (see vascular cardiology note); with partial cloth removal c/b pseudoaneurysm; s/p thrombin injection 9/3 ; repeat duplex L groin pending   -vascular  cards input appreciated    -Treprostinil therapy as above  -sildenafil as above  -home med of rivaroxaban 10 mg po qd - currently on hold   -Heparin gtt resumed 9/3 ON     #GI/:  =resolving RUQ pain, improving transaminitis (most likely due to congestive hepatopathy), resolving EDOUARD (most likely pre-renal)  -received RUQ U.S. 8/30/22 - CBD 7 mm, mild hepatic steatosis  -c/w bowel regimen   -strict I & O's - keep 500 ml  negative  -c/w aldactone home dose  and lasix therapy prn     #ID:  =resolving sepsis  -Sputum Cx 8/31/22 - mod PMN, rare squamous epi cells, mod gm+ cocci in pairs, few gm- rods  -Blood Culture 8/31/22 - NGTD  -Blood Culture 8/29/22 - NGTD  -MRSA/MSSA 8/29/22 - Detected  -Blood Cultrues 8/29/22 - Staph Lugdunensis  -RVP 8/28/22 - ND  -SARS-CoV-2 8/28/22 - ND   -continue Mupirocin 2% therapy q 12 hrs x5 days (started 8/30/22)  -s/p aztreonam therapy - (8/29/22-9/1/22)  -trend WBC  -trend T curve     #FEN/ENDO/HEME:  =improving transaminitis, resolving elevate INR  -obtain CMP/Mg++/PO--4/CBC w diff/PT/PTT/INR  q 12 hrs   -trend LFT's  -heparin gtt resumed ; pt with some bloody mucus when forcefully blowing nose ; c/w nasal moister / ocean spray / education / supplemental o2 with humidification . If not improved will have to place hep gtt on hold . and reach out to ENT   -bleeding precaution   -CBC /PTT q 6 and PRN     PPX  GI ppx ; PPI    DVT ppx ; heparin gtt      42 yr old female with stated hx significant for PulmHTN class I/VI on Treprostinil therapy, Chronic PE who originally presented with palpitations accompanied by orthopnea/N/V/RUQ abd pain over a 2 day period. Course c/b hypotension secondary to acute on chronic cor pulmonale with end organ dysfx (EDOUARD, transaminitis).    Plan:    #Neuro:  =no active issues  -Neuro checks q 2 hrs and prn for changes  -activity as tolerated  -physical therapy       #Pulm:  =Asthma, Cor pulmonale, PulmHTN (class I/VI)  -Supplemental O2 prn to maintain SPO2 > 92% (currently 2 liters N/C); able to wean off to RA ; o2sat >94%   -Ipratropium q 6 hrs and prn for sob/wheezes  -HOB >/= 30 degree angle  -pHTN specialist Dr. Yoon on case; input appreciated   -Marcela titrated off 9/2   -Sildenafil 10 mg TID 9/4;if pt tolerated sildenafil dose 10 mg TID  ( no hypotension ) ; tomorrow morning 9/5 dose will be increased to 20 mg in AM ; if tolerates than will make it TID and keep Dobutamine  gtt on   -continue with subq Treprostinil therapy with eventual plan for IV FLOLAN ; eval 9/6   -s/p R IJTLC 9/2   -c/w aldactone 25 mg PO q d; goal net 500 ml negative  (-800 ml )   -consider obtaining I.R. consult for tunnel catheter placement  -c/w home dose  prednisone 10 mg PO q d        #CV:  =resolving acute on chronic cor pulmonale due to pulmHTN, echogenic mass in RA concerning for clot in transit (found to be organized old clot)  -dobutamine gtt - currently on 2.5 mcg/kg/min ; ( SBP . HR )    -c/w aldactone home dose, goal  net negative   -TTE 8/31/22 - hyperdynamic LVSF, new echogenic mass in RA, RVE concerning for clot in transit, severe PulmHTN  -CTA chest 8/31/22  re-demonstrated severe dilatation of Pulm arteries, without new P.E.  -Duplex bilat L.E. 9/1/22 - No DVT  -s/p catheter directed thrombus aspiration 8/31/22 (see vascular cardiology note); with partial cloth removal c/b pseudoaneurysm; s/p thrombin injection 9/3 ; repeat duplex L groin pending   -vascular  cards input appreciated    -Treprostinil therapy as above  -sildenafil as above  -home med of rivaroxaban 10 mg po qd - currently on hold   -Heparin gtt resumed 9/3 ON     #GI/:  =resolving RUQ pain, improving transaminitis (most likely due to congestive hepatopathy), resolving EDOUARD (most likely pre-renal)  -received RUQ U.S. 8/30/22 - CBD 7 mm, mild hepatic steatosis  -c/w bowel regimen   -strict I & O's - keep 500 ml  negative  -c/w aldactone home dose  and lasix therapy prn     #ID:  =resolving sepsis  -Sputum Cx 8/31/22 - mod PMN, rare squamous epi cells, mod gm+ cocci in pairs, few gm- rods  -Blood Culture 8/31/22 - NGTD  -Blood Culture 8/29/22 - NGTD  -MRSA/MSSA 8/29/22 - Detected  -Blood Cultrues 8/29/22 - Staph Lugdunensis  -RVP 8/28/22 - ND  -SARS-CoV-2 8/28/22 - ND   -continue Mupirocin 2% therapy q 12 hrs x5 days (started 8/30/22)  -s/p aztreonam therapy - (8/29/22-9/1/22)  -trend WBC  -trend T curve     #FEN/ENDO/HEME:  =improving transaminitis, resolving elevate INR  -obtain CMP/Mg++/PO--4/CBC w diff/PT/PTT/INR  q 12 hrs   -trend LFT's  -heparin gtt resumed ; pt with some bloody mucus when forcefully blowing nose ; c/w nasal moister / ocean spray / education / supplemental o2 with humidification . If not improved will have to place hep gtt on hold . and reach out to ENT   -bleeding precaution   -CBC /PTT q 6 and PRN     PPX  GI ppx ; PPI    DVT ppx ; heparin gtt

## 2022-09-04 NOTE — PROGRESS NOTE ADULT - SUBJECTIVE AND OBJECTIVE BOX
INTERVAL HPI/OVERNIGHT EVENTS: Heparin gtt resumed @14; PTT 74.     Patient is a 42y old  Female who presents with a chief complaint of Palpitations (03 Sep 2022 06:51)    42 yr old female with PMHx of PulmHTN class I/VI (dx 2014) on continuous Treprostinil (Remodulin) (sildenafil - currently on hold) c/b Rt heart failure s/p PPM (Dual chamber 2016), Chronic P.E. on rivaroxaban (dx 2017), SVT s/p ablation (5/2020), Asthma (chronic steroid use - prednisone), Fe deficiency anemia who originally admitted to medicine floor  8/28/22 with c/o of palpitations accompanied by orthopnea/N/V/RUQ abd pain over a 2 day period.  This short hospital course c/b hypotension with SBP 80/41 accompanied by chest pain, transaminitis, EDOUARD with peak sCr 3.05 (now resolved, baseline sCr of .90-1.24) requiring transfer to MICU evening 8/28/22 for hypotension in setting of acute on chronic cor pulmonale/pulmHTN with end organ dysfunction (EDOUARD, transaminitis). Pt placed on dobutamine/diuretic/Marcela therapies. Course further c/b TTE (8/31/22) finding of new echogenic mass in RA concerning for clot in transit, CTA chest (8/31/22) re-demonstrated severe dilatation of Pulm arteries, without new P.E., received catheter - directed thrombus aspiration (see note interventional Cards 8/31/22).  Marcela weaned off 9/2. Left common femoral artery pseudoaneurysm was thrombin injected by vascular 9/3 was successful. Heparin gtt was resumed ON . Weaned off fio2 .     ROS:  CONSTITUTIONAL: + dry nose w/ bloody mucus .  Negative fever or chills, feels well, good appetite  EYES:  Negative  blurry vision or double vision  CARDIOVASCULAR:  Negative for chest pain or palpitations  RESPIRATORY:  Negative for cough, wheezing, or SOB   GASTROINTESTINAL:  Negative for nausea, vomiting, diarrhea, constipation, or abdominal pain  GENITOURINARY:  Negative frequency, urgency or dysuria  NEUROLOGIC:  No headache, confusion, dizziness, lightheadedness    Allergies    adhesives (Rash)  penicillin (Rash)  penicillin (Unknown)  vancomycin (Rash)  vancomycin (Unknown)    Intolerances    albuterol (Other; Rash)      ANTIBIOTICS/RELEVANT:  antimicrobials    immunologic:    OTHER:  acetaminophen     Tablet .. 650 milliGRAM(s) Oral every 6 hours PRN  aluminum hydroxide/magnesium hydroxide/simethicone Suspension 30 milliLiter(s) Oral every 4 hours PRN  artificial  tears Solution 1 Drop(s) Both EYES three times a day PRN  chlorhexidine 4% Liquid 1 Application(s) Topical <User Schedule>  cyanocobalamin 1000 MICROGram(s) Oral daily  DOBUTamine Infusion 2.5 MICROgram(s)/kG/Min IV Continuous <Continuous>  heparin  Infusion 1400 Unit(s)/Hr IV Continuous <Continuous>  HYDROmorphone  Injectable 0.5 milliGRAM(s) IV Push every 4 hours PRN  ipratropium    for Nebulization 500 MICROGram(s) Nebulizer every 6 hours PRN  melatonin 3 milliGRAM(s) Oral at bedtime PRN  mupirocin 2% Nasal 1 Application(s) Both Nostrils two times a day  ondansetron Injectable 4 milliGRAM(s) IV Push every 8 hours PRN  pantoprazole    Tablet 40 milliGRAM(s) Oral before breakfast  polyethylene glycol 3350 17 Gram(s) Oral every 12 hours  predniSONE   Tablet 10 milliGRAM(s) Oral daily  Remodulin (Treprostinil) 5mG/mL 1 Vial(s) 1 Vial(s) SubCutaneous Continuous Pump  senna 2 Tablet(s) Oral at bedtime  sildenafil (REVATIO) 10 milliGRAM(s) Oral every 12 hours  sodium chloride 0.65% Nasal 1 Spray(s) Both Nostrils every 6 hours PRN  sodium chloride 0.9% lock flush 10 milliLiter(s) IV Push every 1 hour PRN  spironolactone 25 milliGRAM(s) Oral daily      Objective:  Vital Signs Last 24 Hrs  T(C): 36.7 (04 Sep 2022 03:00), Max: 36.7 (03 Sep 2022 16:00)  T(F): 98 (04 Sep 2022 03:00), Max: 98.1 (03 Sep 2022 16:00)  HR: 102 (04 Sep 2022 04:00) (86 - 113)  RR: 15 (04 Sep 2022 04:00) (15 - 20)  SpO2: 98% (04 Sep 2022 04:00) (97% - 100%)    Parameters below as of 03 Sep 2022 19:00  Patient On (Oxygen Delivery Method): nasal cannula  O2 Flow (L/min): 2      PHYSICAL EXAM:  General - AOx 4   HEENT - moist mucosal membranes . Nose no continues bleeding however with forceful nose blowing has blood tinged mucus    CV - +s1 & s2   Resp - CTA b/l   Abdomen - soft ,NT, ND. BS x 4 quadrants . External  cath collecting yellow urine.   Extremities - intact pulses b/l radial , PT/DT. No edema   Skin - warm, dry       LABS:                        11.8   12.47 )-----------( 219      ( 04 Sep 2022 01:01 )             37.3     09-04    133<L>  |  95<L>  |  10  ----------------------------<  97  4.1   |  28  |  0.71    Ca    8.8      04 Sep 2022 01:01  Phos  4.0     09-04  Mg     2.1     09-04    TPro  6.9  /  Alb  3.7  /  TBili  0.6  /  DBili  x   /  AST  35  /  ALT  222<H>  /  AlkPhos  59  09-04    PT/INR - ( 04 Sep 2022 01:01 )   PT: 13.5 sec;   INR: 1.16 ratio         PTT - ( 04 Sep 2022 01:01 )  PTT:75.1 sec      MICROBIOLOGY:  Culture - Sputum . (08.31.22 @ 20:45)    Gram Stain:   Moderate polymorphonuclear leukocytes per low power field  Rare Squamous epithelial cells per low power field  Moderate Gram positive cocci in pairs seen per oil power field  Few Gram Negative Rods seen per oil power field    Specimen Source: .Sputum Sputum    Culture Results:   Normal Respiratory Laura present    Culture - Blood (08.31.22 @ 00:12)    Specimen Source: .Blood Blood-Peripheral    Culture Results:   No growth to date.    Culture - Blood (08.31.22 @ 00:12)    Specimen Source: .Blood Blood-Peripheral    Culture Results:   No growth to date.      < from: US Duplex Arterial Lower Ext Ltd, Left (09.02.22 @ 14:21) >  ROCEDURE DATE:  09/02/2022          INTERPRETATION:  CLINICAL INFORMATION: Status post catheter directed   thrombus aspiration on 8/31/2022. Left femoral arterial access. Groin   pain.    COMPARISON: None.    TECHNIQUE:  Sonography of the left groin was performed using grayscale,   color, and spectral Doppler.    FINDINGS:  A focus of extravascular flow is visualized superficial to and arising   from the left common femoral artery, compatible with pseudoaneurysm. The   pseudoaneurysm measures 3.4 x 2.1 x 2.3 cm. The neck measures 7 mm in   length and 3 mm in width.    Additional adjacent small hypoechoic focus without associated vascularity   measuring 1.0 x 0.7 x 1.0 cm, likely a small hematoma.    The left common femoral artery and veins are patent with normal waveforms.    IMPRESSION:  Left common femoral artery pseudoaneurysm measuring 3.4 x 2.1 x 2.3 cm.    < end of copied text >

## 2022-09-05 LAB
ALBUMIN SERPL ELPH-MCNC: 3.3 G/DL — SIGNIFICANT CHANGE UP (ref 3.3–5)
ALBUMIN SERPL ELPH-MCNC: 3.8 G/DL — SIGNIFICANT CHANGE UP (ref 3.3–5)
ALP SERPL-CCNC: 59 U/L — SIGNIFICANT CHANGE UP (ref 40–120)
ALP SERPL-CCNC: 59 U/L — SIGNIFICANT CHANGE UP (ref 40–120)
ALT FLD-CCNC: 160 U/L — HIGH (ref 10–45)
ALT FLD-CCNC: 172 U/L — HIGH (ref 10–45)
ANION GAP SERPL CALC-SCNC: 12 MMOL/L — SIGNIFICANT CHANGE UP (ref 5–17)
ANION GAP SERPL CALC-SCNC: 9 MMOL/L — SIGNIFICANT CHANGE UP (ref 5–17)
APTT BLD: 117.2 SEC — HIGH (ref 27.5–35.5)
APTT BLD: 75 SEC — HIGH (ref 27.5–35.5)
APTT BLD: 84.4 SEC — HIGH (ref 27.5–35.5)
AST SERPL-CCNC: 37 U/L — SIGNIFICANT CHANGE UP (ref 10–40)
AST SERPL-CCNC: 39 U/L — SIGNIFICANT CHANGE UP (ref 10–40)
BASE EXCESS BLDV CALC-SCNC: 4.4 MMOL/L — HIGH (ref -2–3)
BILIRUB SERPL-MCNC: 0.8 MG/DL — SIGNIFICANT CHANGE UP (ref 0.2–1.2)
BILIRUB SERPL-MCNC: 0.8 MG/DL — SIGNIFICANT CHANGE UP (ref 0.2–1.2)
BUN SERPL-MCNC: 10 MG/DL — SIGNIFICANT CHANGE UP (ref 7–23)
BUN SERPL-MCNC: 10 MG/DL — SIGNIFICANT CHANGE UP (ref 7–23)
CA-I SERPL-SCNC: 1.2 MMOL/L — SIGNIFICANT CHANGE UP (ref 1.15–1.33)
CALCIUM SERPL-MCNC: 8.7 MG/DL — SIGNIFICANT CHANGE UP (ref 8.4–10.5)
CALCIUM SERPL-MCNC: 9.1 MG/DL — SIGNIFICANT CHANGE UP (ref 8.4–10.5)
CHLORIDE BLDV-SCNC: 97 MMOL/L — SIGNIFICANT CHANGE UP (ref 96–108)
CHLORIDE SERPL-SCNC: 95 MMOL/L — LOW (ref 96–108)
CHLORIDE SERPL-SCNC: 97 MMOL/L — SIGNIFICANT CHANGE UP (ref 96–108)
CO2 BLDV-SCNC: 30 MMOL/L — HIGH (ref 22–26)
CO2 SERPL-SCNC: 24 MMOL/L — SIGNIFICANT CHANGE UP (ref 22–31)
CO2 SERPL-SCNC: 27 MMOL/L — SIGNIFICANT CHANGE UP (ref 22–31)
CREAT SERPL-MCNC: 0.75 MG/DL — SIGNIFICANT CHANGE UP (ref 0.5–1.3)
CREAT SERPL-MCNC: 0.89 MG/DL — SIGNIFICANT CHANGE UP (ref 0.5–1.3)
CULTURE RESULTS: SIGNIFICANT CHANGE UP
CULTURE RESULTS: SIGNIFICANT CHANGE UP
EGFR: 102 ML/MIN/1.73M2 — SIGNIFICANT CHANGE UP
EGFR: 83 ML/MIN/1.73M2 — SIGNIFICANT CHANGE UP
GAS PNL BLDA: SIGNIFICANT CHANGE UP
GAS PNL BLDV: 130 MMOL/L — LOW (ref 136–145)
GAS PNL BLDV: SIGNIFICANT CHANGE UP
GAS PNL BLDV: SIGNIFICANT CHANGE UP
GLUCOSE BLDV-MCNC: 125 MG/DL — HIGH (ref 70–99)
GLUCOSE SERPL-MCNC: 118 MG/DL — HIGH (ref 70–99)
GLUCOSE SERPL-MCNC: 92 MG/DL — SIGNIFICANT CHANGE UP (ref 70–99)
HCO3 BLDV-SCNC: 28 MMOL/L — SIGNIFICANT CHANGE UP (ref 22–29)
HCT VFR BLD CALC: 37.6 % — SIGNIFICANT CHANGE UP (ref 34.5–45)
HCT VFR BLD CALC: 37.7 % — SIGNIFICANT CHANGE UP (ref 34.5–45)
HCT VFR BLDA CALC: 37 % — SIGNIFICANT CHANGE UP (ref 34.5–46.5)
HGB BLD CALC-MCNC: 12.2 G/DL — SIGNIFICANT CHANGE UP (ref 11.7–16.1)
HGB BLD-MCNC: 11.6 G/DL — SIGNIFICANT CHANGE UP (ref 11.5–15.5)
HGB BLD-MCNC: 11.7 G/DL — SIGNIFICANT CHANGE UP (ref 11.5–15.5)
HOROWITZ INDEX BLDV+IHG-RTO: 21 — SIGNIFICANT CHANGE UP
INR BLD: 1.09 RATIO — SIGNIFICANT CHANGE UP (ref 0.88–1.16)
INR BLD: 1.13 RATIO — SIGNIFICANT CHANGE UP (ref 0.88–1.16)
LACTATE BLDV-MCNC: 1.1 MMOL/L — SIGNIFICANT CHANGE UP (ref 0.5–2)
MAGNESIUM SERPL-MCNC: 1.9 MG/DL — SIGNIFICANT CHANGE UP (ref 1.6–2.6)
MAGNESIUM SERPL-MCNC: 2 MG/DL — SIGNIFICANT CHANGE UP (ref 1.6–2.6)
MCHC RBC-ENTMCNC: 22.3 PG — LOW (ref 27–34)
MCHC RBC-ENTMCNC: 22.3 PG — LOW (ref 27–34)
MCHC RBC-ENTMCNC: 30.8 GM/DL — LOW (ref 32–36)
MCHC RBC-ENTMCNC: 31.1 GM/DL — LOW (ref 32–36)
MCV RBC AUTO: 71.6 FL — LOW (ref 80–100)
MCV RBC AUTO: 72.5 FL — LOW (ref 80–100)
NRBC # BLD: 0 /100 WBCS — SIGNIFICANT CHANGE UP (ref 0–0)
NRBC # BLD: 0 /100 WBCS — SIGNIFICANT CHANGE UP (ref 0–0)
PCO2 BLDV: 39 MMHG — SIGNIFICANT CHANGE UP (ref 39–42)
PH BLDV: 7.47 — HIGH (ref 7.32–7.43)
PHOSPHATE SERPL-MCNC: 3.3 MG/DL — SIGNIFICANT CHANGE UP (ref 2.5–4.5)
PHOSPHATE SERPL-MCNC: 3.9 MG/DL — SIGNIFICANT CHANGE UP (ref 2.5–4.5)
PLATELET # BLD AUTO: 240 K/UL — SIGNIFICANT CHANGE UP (ref 150–400)
PLATELET # BLD AUTO: 246 K/UL — SIGNIFICANT CHANGE UP (ref 150–400)
PO2 BLDV: 44 MMHG — SIGNIFICANT CHANGE UP (ref 25–45)
POTASSIUM BLDV-SCNC: 5 MMOL/L — SIGNIFICANT CHANGE UP (ref 3.5–5.1)
POTASSIUM SERPL-MCNC: 4.4 MMOL/L — SIGNIFICANT CHANGE UP (ref 3.5–5.3)
POTASSIUM SERPL-MCNC: 4.9 MMOL/L — SIGNIFICANT CHANGE UP (ref 3.5–5.3)
POTASSIUM SERPL-SCNC: 4.4 MMOL/L — SIGNIFICANT CHANGE UP (ref 3.5–5.3)
POTASSIUM SERPL-SCNC: 4.9 MMOL/L — SIGNIFICANT CHANGE UP (ref 3.5–5.3)
PROT SERPL-MCNC: 6.9 G/DL — SIGNIFICANT CHANGE UP (ref 6–8.3)
PROT SERPL-MCNC: 7.3 G/DL — SIGNIFICANT CHANGE UP (ref 6–8.3)
PROTHROM AB SERPL-ACNC: 12.6 SEC — SIGNIFICANT CHANGE UP (ref 10.5–13.4)
PROTHROM AB SERPL-ACNC: 13 SEC — SIGNIFICANT CHANGE UP (ref 10.5–13.4)
RBC # BLD: 5.2 M/UL — SIGNIFICANT CHANGE UP (ref 3.8–5.2)
RBC # BLD: 5.25 M/UL — HIGH (ref 3.8–5.2)
RBC # FLD: 18 % — HIGH (ref 10.3–14.5)
RBC # FLD: 18.3 % — HIGH (ref 10.3–14.5)
SAO2 % BLDV: 72.4 % — SIGNIFICANT CHANGE UP (ref 67–88)
SODIUM SERPL-SCNC: 131 MMOL/L — LOW (ref 135–145)
SODIUM SERPL-SCNC: 133 MMOL/L — LOW (ref 135–145)
SPECIMEN SOURCE: SIGNIFICANT CHANGE UP
SPECIMEN SOURCE: SIGNIFICANT CHANGE UP
WBC # BLD: 13.49 K/UL — HIGH (ref 3.8–10.5)
WBC # BLD: 14.25 K/UL — HIGH (ref 3.8–10.5)
WBC # FLD AUTO: 13.49 K/UL — HIGH (ref 3.8–10.5)
WBC # FLD AUTO: 14.25 K/UL — HIGH (ref 3.8–10.5)

## 2022-09-05 PROCEDURE — 99291 CRITICAL CARE FIRST HOUR: CPT | Mod: GC

## 2022-09-05 PROCEDURE — 99233 SBSQ HOSP IP/OBS HIGH 50: CPT

## 2022-09-05 RX ORDER — MAGNESIUM SULFATE 500 MG/ML
1 VIAL (ML) INJECTION ONCE
Refills: 0 | Status: COMPLETED | OUTPATIENT
Start: 2022-09-05 | End: 2022-09-05

## 2022-09-05 RX ORDER — HEPARIN SODIUM 5000 [USP'U]/ML
1200 INJECTION INTRAVENOUS; SUBCUTANEOUS
Qty: 25000 | Refills: 0 | Status: DISCONTINUED | OUTPATIENT
Start: 2022-09-05 | End: 2022-09-07

## 2022-09-05 RX ADMIN — Medication 20 MILLIGRAM(S): at 13:31

## 2022-09-05 RX ADMIN — HEPARIN SODIUM 12 UNIT(S)/HR: 5000 INJECTION INTRAVENOUS; SUBCUTANEOUS at 01:35

## 2022-09-05 RX ADMIN — Medication 100 GRAM(S): at 01:07

## 2022-09-05 RX ADMIN — Medication 10 MILLIGRAM(S): at 05:19

## 2022-09-05 RX ADMIN — SPIRONOLACTONE 25 MILLIGRAM(S): 25 TABLET, FILM COATED ORAL at 06:07

## 2022-09-05 RX ADMIN — Medication 20 MILLIGRAM(S): at 21:23

## 2022-09-05 RX ADMIN — Medication 650 MILLIGRAM(S): at 07:22

## 2022-09-05 RX ADMIN — Medication 20 MILLIGRAM(S): at 05:19

## 2022-09-05 RX ADMIN — Medication 650 MILLIGRAM(S): at 03:05

## 2022-09-05 RX ADMIN — PREGABALIN 1000 MICROGRAM(S): 225 CAPSULE ORAL at 13:30

## 2022-09-05 RX ADMIN — Medication 650 MILLIGRAM(S): at 08:20

## 2022-09-05 RX ADMIN — Medication 650 MILLIGRAM(S): at 02:07

## 2022-09-05 RX ADMIN — HYDROMORPHONE HYDROCHLORIDE 0.5 MILLIGRAM(S): 2 INJECTION INTRAMUSCULAR; INTRAVENOUS; SUBCUTANEOUS at 18:39

## 2022-09-05 RX ADMIN — CHLORHEXIDINE GLUCONATE 1 APPLICATION(S): 213 SOLUTION TOPICAL at 06:07

## 2022-09-05 RX ADMIN — Medication 500 MICROGRAM(S): at 12:21

## 2022-09-05 RX ADMIN — HYDROMORPHONE HYDROCHLORIDE 0.5 MILLIGRAM(S): 2 INJECTION INTRAMUSCULAR; INTRAVENOUS; SUBCUTANEOUS at 20:00

## 2022-09-05 RX ADMIN — PANTOPRAZOLE SODIUM 40 MILLIGRAM(S): 20 TABLET, DELAYED RELEASE ORAL at 06:08

## 2022-09-05 NOTE — PROGRESS NOTE ADULT - SUBJECTIVE AND OBJECTIVE BOX
Patient seen and examined at bedside.    Overnight Events: No acute events.    Current Meds:  acetaminophen     Tablet .. 650 milliGRAM(s) Oral every 6 hours PRN  aluminum hydroxide/magnesium hydroxide/simethicone Suspension 30 milliLiter(s) Oral every 4 hours PRN  artificial  tears Solution 1 Drop(s) Both EYES three times a day PRN  chlorhexidine 4% Liquid 1 Application(s) Topical <User Schedule>  cyanocobalamin 1000 MICROGram(s) Oral daily  DOBUTamine Infusion 2.5 MICROgram(s)/kG/Min IV Continuous <Continuous>  heparin  Infusion 1200 Unit(s)/Hr IV Continuous <Continuous>  HYDROmorphone  Injectable 0.5 milliGRAM(s) IV Push every 4 hours PRN  ipratropium    for Nebulization 500 MICROGram(s) Nebulizer every 6 hours PRN  melatonin 3 milliGRAM(s) Oral at bedtime PRN  ondansetron Injectable 4 milliGRAM(s) IV Push every 8 hours PRN  pantoprazole    Tablet 40 milliGRAM(s) Oral before breakfast  polyethylene glycol 3350 17 Gram(s) Oral every 12 hours  predniSONE   Tablet 10 milliGRAM(s) Oral daily  Remodulin (Treprostinil) 5mG/mL 1 Vial(s) 1 Vial(s) SubCutaneous Continuous Pump  senna 2 Tablet(s) Oral at bedtime  sildenafil (REVATIO) 20 milliGRAM(s) Oral <User Schedule>  sildenafil (REVATIO) 10 milliGRAM(s) Oral <User Schedule>  sodium chloride 0.65% Nasal 1 Spray(s) Both Nostrils every 6 hours PRN  sodium chloride 0.9% lock flush 10 milliLiter(s) IV Push every 1 hour PRN  spironolactone 25 milliGRAM(s) Oral daily      Vitals:  T(F): 98.1 (09-05), Max: 98.4 (09-04)  HR: 101 (09-05) (98 - 121)  BP: --  RR: 18 (09-05)  SpO2: 93% (09-05)  I&O's Summary    04 Sep 2022 07:01  -  05 Sep 2022 07:00  --------------------------------------------------------  IN: 991.2 mL / OUT: 1650 mL / NET: -658.8 mL    05 Sep 2022 07:01  -  05 Sep 2022 09:55  --------------------------------------------------------  IN: 79.8 mL / OUT: 0 mL / NET: 79.8 mL        Physical Exam:  Appearance: No acute distress  Eyes: PERRL, EOMI, pink conjunctiva  Cardiovascular: RRR, S1, S2, no murmurs, rubs, or gallops; no edema; JVD  Respiratory: Clear to auscultation bilaterally  Gastrointestinal: soft, non-tender, non-distended with normal bowel sounds  Musculoskeletal: No clubbing; no joint deformity   Neurologic: Non-focal  Psychiatry: AAOx3, mood & affect appropriate                          11.6   14.25 )-----------( 240      ( 05 Sep 2022 00:23 )             37.7     09-05    133<L>  |  97  |  10  ----------------------------<  92  4.4   |  24  |  0.75    Ca    8.7      05 Sep 2022 00:23  Phos  3.3     09-05  Mg     1.9     09-05    TPro  6.9  /  Alb  3.3  /  TBili  0.8  /  DBili  x   /  AST  39  /  ALT  172<H>  /  AlkPhos  59  09-05    PT/INR - ( 05 Sep 2022 00:23 )   PT: 13.0 sec;   INR: 1.13 ratio         PTT - ( 05 Sep 2022 06:36 )  PTT:84.4 sec      Serum Pro-Brain Natriuretic Peptide: 71 pg/mL (09-02 @ 00:22)       Patient seen and examined at bedside.    Overnight Events: No acute events.    Current Meds:  acetaminophen     Tablet .. 650 milliGRAM(s) Oral every 6 hours PRN  aluminum hydroxide/magnesium hydroxide/simethicone Suspension 30 milliLiter(s) Oral every 4 hours PRN  artificial  tears Solution 1 Drop(s) Both EYES three times a day PRN  chlorhexidine 4% Liquid 1 Application(s) Topical <User Schedule>  cyanocobalamin 1000 MICROGram(s) Oral daily  DOBUTamine Infusion 2.5 MICROgram(s)/kG/Min IV Continuous <Continuous>  heparin  Infusion 1200 Unit(s)/Hr IV Continuous <Continuous>  HYDROmorphone  Injectable 0.5 milliGRAM(s) IV Push every 4 hours PRN  ipratropium    for Nebulization 500 MICROGram(s) Nebulizer every 6 hours PRN  melatonin 3 milliGRAM(s) Oral at bedtime PRN  ondansetron Injectable 4 milliGRAM(s) IV Push every 8 hours PRN  pantoprazole    Tablet 40 milliGRAM(s) Oral before breakfast  polyethylene glycol 3350 17 Gram(s) Oral every 12 hours  predniSONE   Tablet 10 milliGRAM(s) Oral daily  Remodulin (Treprostinil) 5mG/mL 1 Vial(s) 1 Vial(s) SubCutaneous Continuous Pump  senna 2 Tablet(s) Oral at bedtime  sildenafil (REVATIO) 20 milliGRAM(s) Oral <User Schedule>  sildenafil (REVATIO) 10 milliGRAM(s) Oral <User Schedule>  sodium chloride 0.65% Nasal 1 Spray(s) Both Nostrils every 6 hours PRN  sodium chloride 0.9% lock flush 10 milliLiter(s) IV Push every 1 hour PRN  spironolactone 25 milliGRAM(s) Oral daily      Vitals:  T(F): 98.1 (09-05), Max: 98.4 (09-04)  HR: 101 (09-05) (98 - 121)  BP: --  RR: 18 (09-05)  SpO2: 93% (09-05)  I&O's Summary    04 Sep 2022 07:01  -  05 Sep 2022 07:00  --------------------------------------------------------  IN: 991.2 mL / OUT: 1650 mL / NET: -658.8 mL    05 Sep 2022 07:01  -  05 Sep 2022 09:55  --------------------------------------------------------  IN: 79.8 mL / OUT: 0 mL / NET: 79.8 mL        Physical Exam:  Appearance: No acute distress  Cardiovascular: RRR, S1, S2, no murmurs, rubs, or gallops; no JVD  Respiratory: Clear to auscultation bilaterally  Gastrointestinal: soft, non-tender, non-distended with normal bowel sounds  Musculoskeletal: No clubbing; no joint deformity, warm, no edema   Neurologic: Non-focal  Psychiatry: AAOx3, mood & affect appropriate                          11.6   14.25 )-----------( 240      ( 05 Sep 2022 00:23 )             37.7     09-05    133<L>  |  97  |  10  ----------------------------<  92  4.4   |  24  |  0.75    Ca    8.7      05 Sep 2022 00:23  Phos  3.3     09-05  Mg     1.9     09-05    TPro  6.9  /  Alb  3.3  /  TBili  0.8  /  DBili  x   /  AST  39  /  ALT  172<H>  /  AlkPhos  59  09-05    PT/INR - ( 05 Sep 2022 00:23 )   PT: 13.0 sec;   INR: 1.13 ratio         PTT - ( 05 Sep 2022 06:36 )  PTT:84.4 sec      Serum Pro-Brain Natriuretic Peptide: 71 pg/mL (09-02 @ 00:22)

## 2022-09-05 NOTE — PROGRESS NOTE ADULT - ASSESSMENT
42 yr old female with stated hx significant for PulmHTN class I/VI on Treprostinil therapy, Chronic PE who originally presented with palpitations accompanied by orthopnea/N/V/RUQ abd pain over a 2 day period. Course c/b hypotension secondary to acute on chronic cor pulmonale with end organ dysfx (EDOUARD, transaminitis).    Plan:    #Neuro:  =no active issues  -Neuro checks   -activity as tolerated  -physical therapy       #Pulm:  =Asthma, Cor pulmonale, PulmHTN (class I/VI)  -Supplemental O2 prn to maintain SPO2 > 92% (currently 2 liters N/C); able to wean off to RA ; o2sat >94%   -Ipratropium q 6 hrs and prn for sob/wheezes  -HOB >/= 30 degree angle  -pHTN specialist Dr. Yoon on case; input appreciated   -Marcela titrated off 9/2   -Sildenafil 20 mg 9/5 q am, Dose for 2 pm and 10 pm will remian 10 mg . and keep Dobutamine  gtt @ 2.5  . Dr. Yoon rec appreciated   -continue with subq Treprostinil therapy with eventual plan for IV FLOLAN ; eval 9/6    -c/w aldactone 25 mg PO q d; goal net 500 ml negative  (- 650 ml )   -consider obtaining I.R. consult for tunnel catheter placement  -c/w home dose  prednisone 10 mg PO q d        #CV:  =resolving acute on chronic cor pulmonale due to pulmHTN, echogenic mass in RA concerning for clot in transit (found to be organized old clot)  -dobutamine gtt - currently on 2.5 mcg/kg/min ; ( SBP . HR )    -c/w aldactone home dose, goal  net negative   -TTE 8/31/22 - hyperdynamic LVSF, new echogenic mass in RA, RVE concerning for clot in transit, severe PulmHTN  -CTA chest 8/31/22  re-demonstrated severe dilatation of Pulm arteries, without new P.E.  -Duplex bilat L.E. 9/1/22 - No DVT  -s/p catheter directed thrombus aspiration 8/31/22 (see vascular cardiology note); with partial cloth removal c/b pseudoaneurysm; s/p thrombin injection 9/3 ; repeat duplex L groin 9/4 pending results   -vascular  cards input appreciated    -Treprostinil therapy as above  -sildenafil as above  -home med of rivaroxaban 10 mg po qd - currently on hold   -Heparin gtt resumed 9/3 -->   -bleeding precaution     #GI/:  =resolving RUQ pain, improving transaminitis (most likely due to congestive hepatopathy), resolving EDOUARD (most likely pre-renal)  -received RUQ U.S. 8/30/22 - CBD 7 mm, mild hepatic steatosis  -c/w bowel regimen   -strict I & O's - goal keep 500 ml  negative  -c/w aldactone home dose  and lasix therapy prn   -daily standing weight     #ID:  =resolving sepsis  -Sputum Cx 8/31/22 - mod PMN, rare squamous epi cells, mod gm+ cocci in pairs, few gm- rods  -Blood Culture 8/31/22 - NGTD  -Blood Culture 8/29/22 - NGTD  -MRSA/MSSA 8/29/22 - Detected  -Blood Cultrues 8/29/22 - Staph Lugdunensis  -RVP 8/28/22 - ND  -SARS-CoV-2 8/28/22 - ND   -continue Mupirocin 2% therapy q 12 hrs x5 days (started 8/30/22)  -s/p aztreonam therapy - (8/29/22-9/1/22)  -trend WBC  -trend T curve     #FEN/ENDO/HEME:  =improving transaminitis, resolving elevate INR  -obtain CMP/Mg++/PO--4/CBC w diff/PT/PTT/INR  q 12 hrs   -trend LFT's; improving   -heparin gtt in progress ; PTT goal 60-99   -bleeding precaution   -CBC /PTT q 6 and PRN     PPX  GI ppx ; PPI    DVT ppx ; heparin gtt     LINE  -s/p R IJTLC 9/2    42 yr old female with stated hx significant for PulmHTN class I/VI on Treprostinil therapy, Chronic PE who originally presented with palpitations accompanied by orthopnea/N/V/RUQ abd pain over a 2 day period. Course c/b hypotension secondary to acute on chronic cor pulmonale with end organ dysfx (EDOUARD, transaminitis).    Plan:    #Neuro:  =no active issues  -Neuro checks   -activity as tolerated  -physical therapy       #Pulm:  =Asthma, Cor pulmonale, PulmHTN (class I/VI)  -Supplemental O2 prn to maintain SPO2 > 92% (currently 2 liters N/C); able to wean off to RA ; o2sat >94%   -Ipratropium q 6 hrs and prn for sob/wheezes  -HOB >/= 30 degree angle  -pHTN specialist Dr. Yoon on case; input appreciated   -Marcela titrated off 9/2   -Sildenafil increased to 20 mg TID (9/5) and keep Dobutamine  gtt @ 2.5 . before d/c Dobutamine will send pre and post venous sats ;   -continue with subq Treprostinil therapy with eventual plan for IV FLOLAN ; eval 9/6    -c/w aldactone 25 mg PO q d; goal net 500 ml negative  (- 650 ml )   -consider obtaining I.R. consult for tunnel catheter placement  -c/w home dose  prednisone 10 mg PO q d        #CV:  =resolving acute on chronic cor pulmonale due to pulmHTN, echogenic mass in RA concerning for clot in transit (found to be organized old clot)  -dobutamine gtt - currently on 2.5 mcg/kg/min ; ( SBP . HR )    -c/w aldactone home dose, goal  net negative   -TTE 8/31/22 - hyperdynamic LVSF, new echogenic mass in RA, RVE concerning for clot in transit, severe PulmHTN  -CTA chest 8/31/22  re-demonstrated severe dilatation of Pulm arteries, without new P.E.  -Duplex bilat L.E. 9/1/22 - No DVT  -s/p catheter directed thrombus aspiration 8/31/22 (see vascular cardiology note); with partial cloth removal c/b pseudoaneurysm; s/p thrombin injection 9/3 ; repeat duplex L groin 9/4 pending results   -vascular  cards input appreciated    -Treprostinil therapy as above  -sildenafil as above  -home med of rivaroxaban 10 mg po qd - currently on hold   -Heparin gtt resumed 9/3 -->   -bleeding precaution     #GI/:  =resolving RUQ pain, improving transaminitis (most likely due to congestive hepatopathy), resolving EDOUARD (most likely pre-renal)  -received RUQ U.S. 8/30/22 - CBD 7 mm, mild hepatic steatosis  -c/w bowel regimen   -strict I & O's - goal keep 500 ml  negative  -c/w aldactone home dose  and lasix therapy prn   -daily standing weight     #ID:  =resolving sepsis  -Sputum Cx 8/31/22 - mod PMN, rare squamous epi cells, mod gm+ cocci in pairs, few gm- rods  -Blood Culture 8/31/22 - NGTD  -Blood Culture 8/29/22 - NGTD  -MRSA/MSSA 8/29/22 - Detected  -Blood Cultrues 8/29/22 - Staph Lugdunensis  -RVP 8/28/22 - ND  -SARS-CoV-2 8/28/22 - ND   -continue Mupirocin 2% therapy q 12 hrs x5 days (started 8/30/22)  -s/p aztreonam therapy - (8/29/22-9/1/22)  -trend WBC  -trend T curve     #FEN/ENDO/HEME:  =improving transaminitis, resolving elevate INR  -obtain CMP/Mg++/PO--4/CBC w diff/PT/PTT/INR  q 12 hrs   -trend LFT's; improving   -heparin gtt in progress ; PTT goal 60-99   -bleeding precaution   -CBC /PTT q 6 and PRN     PPX  GI ppx ; PPI    DVT ppx ; heparin gtt     LINE  -s/p R IJTLC 9/2

## 2022-09-05 NOTE — PROGRESS NOTE ADULT - ATTENDING COMMENTS
Briefly, 42F PMH PE on xarelto, pulmonary HTN (likely group I and group IV), ILD, JULIA, obesity presents to the hospital for 2 days of palpitations and abdominal pain/nausea/emesis. Denies any inciting event. Noted to have labile hypotension and labs notable for worsening transaminitis, BNP 9k (much higher than prior) as well as acute kidney injury. Feels improved since initiation of /Marcela. Found to have RA clot on repeat TTE and underwent CTA which did not reveal any PE but went for aspiration and had partial throbus removal and stopped as appeared more chronic. Changed to heparin gtt. On exam, JVP normal, prominent P2, tachycardic, CTAB, nontender abdomen, warm extremities. Labs reviewed - improving LFTs and renal function normalized. TTE done which showed hyperdynamic and compressed LV, severe RV dysfunction, mod TR, dilated IVC. Repeat TTE with improved RV function however RA thrombus noted. Overall stage D, NYHA class IV with severe pulmonary HTN.  - weaned off inhaled nitric oxide this weekend  - on dobutamine 2.5 mcg/kg/min  - will be increasing sildenafil today and tomorrow, as sildenafil is increased can wean off dobutamine. Would obtain a central sat before and after the wan  - c/w Remodulin; d/w Dr. Yoon possible transition to IV Remodulin   - c/w jacqueline 25 mg daily   - vascular c/s for R pseudoaneurysm  - will need to evaluate if patient candidate for transplant but unlikely d/t BMI  - prognosis guarded

## 2022-09-05 NOTE — PROGRESS NOTE ADULT - PROBLEM SELECTOR PLAN 4
Now s/p catheter directed thrombus aspiration, vascular cardiology following, heparin per MICU/Vascular cards/Vascular surgery given pseudoaneurysm.

## 2022-09-05 NOTE — PROGRESS NOTE ADULT - SUBJECTIVE AND OBJECTIVE BOX
INTERVAL HPI/OVERNIGHT EVENTS:    Patient is a 42y old  Female who presents with a chief complaint of Palpitations (04 Sep 2022 05:52)    42 yr old female with PMHx of PulmHTN class I/VI (dx 2014) on continuous Treprostinil (Remodulin) (sildenafil - currently on hold) c/b Rt heart failure s/p PPM (Dual chamber 2016), Chronic P.E. on rivaroxaban (dx 2017), SVT s/p ablation (5/2020), Asthma (chronic steroid use - prednisone), Fe deficiency anemia who originally admitted to medicine floor  8/28/22 with c/o of palpitations accompanied by orthopnea/N/V/RUQ abd pain over a 2 day period.  This short hospital course c/b hypotension with SBP 80/41 accompanied by chest pain, transaminitis, EDOUARD with peak sCr 3.05 (now resolved, baseline sCr of .90-1.24) requiring transfer to MICU evening 8/28/22 for hypotension in setting of acute on chronic cor pulmonale/pulmHTN with end organ dysfunction (EDOUARD, transaminitis). Pt placed on dobutamine/diuretic/Marcela therapies. Course further c/b TTE (8/31/22) finding of new echogenic mass in RA concerning for clot in transit, CTA chest (8/31/22) re-demonstrated severe dilatation of Pulm arteries, without new P.E., received catheter - directed thrombus aspiration (see note interventional Cards 8/31/22).  Marcela weaned off 9/2. Left common femoral artery pseudoaneurysm was thrombin injected by vascular 9/3 was successful. Heparin gtt was resumed ON . Weaned off fio2 ; maintain 02sat > 94%  . C/w nasal moisturizer; no active bleeding . Tolerating increase of sildenafil .      ROS:  CONSTITUTIONAL:  Negative fever or chills, feels well, good appetite  EYES:  Negative  blurry vision or double vision  CARDIOVASCULAR:  Negative for chest pain or palpitations  RESPIRATORY:  Negative for cough, wheezing, or SOB   GASTROINTESTINAL:  Negative for nausea, vomiting, diarrhea, constipation, or abdominal pain  GENITOURINARY:  Negative frequency, urgency or dysuria  NEUROLOGIC:  No headache, confusion, dizziness, lightheadedness    Allergies    adhesives (Rash)  penicillin (Rash)  penicillin (Unknown)  vancomycin (Rash)  vancomycin (Unknown)    Intolerances    albuterol (Other; Rash)      ANTIBIOTICS/RELEVANT:  antimicrobials    immunologic:    OTHER:  acetaminophen     Tablet .. 650 milliGRAM(s) Oral every 6 hours PRN  aluminum hydroxide/magnesium hydroxide/simethicone Suspension 30 milliLiter(s) Oral every 4 hours PRN  artificial  tears Solution 1 Drop(s) Both EYES three times a day PRN  chlorhexidine 4% Liquid 1 Application(s) Topical <User Schedule>  cyanocobalamin 1000 MICROGram(s) Oral daily  DOBUTamine Infusion 2.5 MICROgram(s)/kG/Min IV Continuous <Continuous>  heparin  Infusion 1200 Unit(s)/Hr IV Continuous <Continuous>  HYDROmorphone  Injectable 0.5 milliGRAM(s) IV Push every 4 hours PRN  ipratropium    for Nebulization 500 MICROGram(s) Nebulizer every 6 hours PRN  melatonin 3 milliGRAM(s) Oral at bedtime PRN  ondansetron Injectable 4 milliGRAM(s) IV Push every 8 hours PRN  pantoprazole    Tablet 40 milliGRAM(s) Oral before breakfast  polyethylene glycol 3350 17 Gram(s) Oral every 12 hours  predniSONE   Tablet 10 milliGRAM(s) Oral daily  Remodulin (Treprostinil) 5mG/mL 1 Vial(s) 1 Vial(s) SubCutaneous Continuous Pump  senna 2 Tablet(s) Oral at bedtime  sildenafil (REVATIO) 20 milliGRAM(s) Oral <User Schedule>  sildenafil (REVATIO) 10 milliGRAM(s) Oral <User Schedule>  sodium chloride 0.65% Nasal 1 Spray(s) Both Nostrils every 6 hours PRN  sodium chloride 0.9% lock flush 10 milliLiter(s) IV Push every 1 hour PRN  spironolactone 25 milliGRAM(s) Oral daily      Objective:  Vital Signs Last 24 Hrs  T(C): 36.7 (05 Sep 2022 04:00), Max: 37.1 (04 Sep 2022 08:00)  T(F): 98.1 (05 Sep 2022 04:00), Max: 98.8 (04 Sep 2022 08:00)  HR: 105 (05 Sep 2022 06:00) (98 - 121)  RR: 15 (05 Sep 2022 06:00) (15 - 27)  SpO2: 94% (05 Sep 2022 06:00) (91% - 99%)    Parameters below as of 04 Sep 2022 21:38  Patient On (Oxygen Delivery Method): room air        PHYSICAL EXAM:  CONSTITUTIONAL: + dry nose w/ bloody mucus .  Negative fever or chills, feels well, good appetite  EYES:  Negative  blurry vision or double vision  CARDIOVASCULAR:  Negative for chest pain or palpitations  RESPIRATORY:  Negative for cough, wheezing, or SOB   GASTROINTESTINAL:  Negative for nausea, vomiting, diarrhea, constipation, or abdominal pain  GENITOURINARY:  Negative frequency, urgency or dysuria  NEUROLOGIC:  No headache, confusion, dizziness, lightheadedness    LABS:                        11.6   14.25 )-----------( 240      ( 05 Sep 2022 00:23 )             37.7     09-05    133<L>  |  97  |  10  ----------------------------<  92  4.4   |  24  |  0.75    Ca    8.7      05 Sep 2022 00:23  Phos  3.3     09-05  Mg     1.9     09-05    TPro  6.9  /  Alb  3.3  /  TBili  0.8  /  DBili  x   /  AST  39  /  ALT  172<H>  /  AlkPhos  59  09-05    PT/INR - ( 05 Sep 2022 00:23 )   PT: 13.0 sec;   INR: 1.13 ratio         PTT - ( 05 Sep 2022 00:23 )  PTT:117.2 sec      MICROBIOLOGY:  Culture - Sputum . (08.31.22 @ 20:45)    Gram Stain:   Moderate polymorphonuclear leukocytes per low power field  Rare Squamous epithelial cells per low power field  Moderate Gram positive cocci in pairs seen per oil power field  Few Gram Negative Rods seen per oil power field    Specimen Source: .Sputum Sputum    Culture Results:   Normal Respiratory Laura present    Culture - Blood (08.31.22 @ 00:12)    Specimen Source: .Blood Blood-Peripheral    Culture Results:   No growth to date.    Culture - Blood (08.31.22 @ 00:12)    Specimen Source: .Blood Blood-Peripheral    Culture Results:   No growth to date.      Pending report for 9/4 result of L groin duplex .  Pending report for 9/3 results of L groin duplex .      < from: US Duplex Arterial Lower Ext Ltd, Left (09.02.22 @ 14:21) >  ROCEDURE DATE:  09/02/2022          INTERPRETATION:  CLINICAL INFORMATION: Status post catheter directed   thrombus aspiration on 8/31/2022. Left femoral arterial access. Groin   pain.    COMPARISON: None.    TECHNIQUE:  Sonography of the left groin was performed using grayscale,   color, and spectral Doppler.    FINDINGS:  A focus of extravascular flow is visualized superficial to and arising   from the left common femoral artery, compatible with pseudoaneurysm. The   pseudoaneurysm measures 3.4 x 2.1 x 2.3 cm. The neck measures 7 mm in   length and 3 mm in width.    Additional adjacent small hypoechoic focus without associated vascularity   measuring 1.0 x 0.7 x 1.0 cm, likely a small hematoma.    The left common femoral artery and veins are patent with normal waveforms.    IMPRESSION:  Left common femoral artery pseudoaneurysm measuring 3.4 x 2.1 x 2.3 cm.    < end of copied text >   INTERVAL HPI/OVERNIGHT EVENTS:    Patient is a 42y old  Female who presents with a chief complaint of Palpitations (04 Sep 2022 05:52)    42 yr old female with PMHx of PulmHTN class I/VI (dx 2014) on continuous Treprostinil (Remodulin) (sildenafil - currently on hold) c/b Rt heart failure s/p PPM (Dual chamber 2016), Chronic P.E. on rivaroxaban (dx 2017), SVT s/p ablation (5/2020), Asthma (chronic steroid use - prednisone), Fe deficiency anemia who originally admitted to medicine floor  8/28/22 with c/o of palpitations accompanied by orthopnea/N/V/RUQ abd pain over a 2 day period.  This short hospital course c/b hypotension with SBP 80/41 accompanied by chest pain, transaminitis, EDOUARD with peak sCr 3.05 (now resolved, baseline sCr of .90-1.24) requiring transfer to MICU evening 8/28/22 for hypotension in setting of acute on chronic cor pulmonale/pulmHTN with end organ dysfunction (EDOUARD, transaminitis). Pt placed on dobutamine/diuretic/Marcela therapies. Course further c/b TTE (8/31/22) finding of new echogenic mass in RA concerning for clot in transit, CTA chest (8/31/22) re-demonstrated severe dilatation of Pulm arteries, without new P.E., received catheter - directed thrombus aspiration (see note interventional Cards 8/31/22).  Marcela weaned off 9/2. Left common femoral artery pseudoaneurysm was thrombin injected by vascular 9/3 was successful. Heparin gtt was resumed ON . Weaned off fio2 ; maintain 02sat > 94%  . C/w nasal moisturizer; no active bleeding . Tolerating increase sildenafil dose.      ROS:  CONSTITUTIONAL:  Negative fever or chills, feels well, good appetite  EYES:  Negative  blurry vision or double vision  CARDIOVASCULAR:  Negative for chest pain or palpitations  RESPIRATORY:  Negative for cough, wheezing, or SOB   GASTROINTESTINAL:  Negative for nausea, vomiting, diarrhea, constipation, or abdominal pain  GENITOURINARY:  Negative frequency, urgency or dysuria  NEUROLOGIC:  No headache, confusion, dizziness, lightheadedness    Allergies    adhesives (Rash)  penicillin (Rash)  penicillin (Unknown)  vancomycin (Rash)  vancomycin (Unknown)    Intolerances    albuterol (Other; Rash)      ANTIBIOTICS/RELEVANT:  antimicrobials    immunologic:    OTHER:  acetaminophen     Tablet .. 650 milliGRAM(s) Oral every 6 hours PRN  aluminum hydroxide/magnesium hydroxide/simethicone Suspension 30 milliLiter(s) Oral every 4 hours PRN  artificial  tears Solution 1 Drop(s) Both EYES three times a day PRN  chlorhexidine 4% Liquid 1 Application(s) Topical <User Schedule>  cyanocobalamin 1000 MICROGram(s) Oral daily  DOBUTamine Infusion 2.5 MICROgram(s)/kG/Min IV Continuous <Continuous>  heparin  Infusion 1200 Unit(s)/Hr IV Continuous <Continuous>  HYDROmorphone  Injectable 0.5 milliGRAM(s) IV Push every 4 hours PRN  ipratropium    for Nebulization 500 MICROGram(s) Nebulizer every 6 hours PRN  melatonin 3 milliGRAM(s) Oral at bedtime PRN  ondansetron Injectable 4 milliGRAM(s) IV Push every 8 hours PRN  pantoprazole    Tablet 40 milliGRAM(s) Oral before breakfast  polyethylene glycol 3350 17 Gram(s) Oral every 12 hours  predniSONE   Tablet 10 milliGRAM(s) Oral daily  Remodulin (Treprostinil) 5mG/mL 1 Vial(s) 1 Vial(s) SubCutaneous Continuous Pump  senna 2 Tablet(s) Oral at bedtime  sildenafil (REVATIO) 20 milliGRAM(s) Oral <User Schedule>  sildenafil (REVATIO) 10 milliGRAM(s) Oral <User Schedule>  sodium chloride 0.65% Nasal 1 Spray(s) Both Nostrils every 6 hours PRN  sodium chloride 0.9% lock flush 10 milliLiter(s) IV Push every 1 hour PRN  spironolactone 25 milliGRAM(s) Oral daily      Objective:  Vital Signs Last 24 Hrs  T(C): 36.7 (05 Sep 2022 04:00), Max: 37.1 (04 Sep 2022 08:00)  T(F): 98.1 (05 Sep 2022 04:00), Max: 98.8 (04 Sep 2022 08:00)  HR: 105 (05 Sep 2022 06:00) (98 - 121)  RR: 15 (05 Sep 2022 06:00) (15 - 27)  SpO2: 94% (05 Sep 2022 06:00) (91% - 99%)    Parameters below as of 04 Sep 2022 21:38  Patient On (Oxygen Delivery Method): room air        PHYSICAL EXAM:  CONSTITUTIONAL: + dry nose w/ bloody mucus .  Negative fever or chills, feels well, good appetite  EYES:  Negative  blurry vision or double vision  CARDIOVASCULAR:  Negative for chest pain or palpitations  RESPIRATORY:  Negative for cough, wheezing, or SOB   GASTROINTESTINAL:  Negative for nausea, vomiting, diarrhea, constipation, or abdominal pain  GENITOURINARY:  Negative frequency, urgency or dysuria  NEUROLOGIC:  No headache, confusion, dizziness, lightheadedness    LABS:                        11.6   14.25 )-----------( 240      ( 05 Sep 2022 00:23 )             37.7     09-05    133<L>  |  97  |  10  ----------------------------<  92  4.4   |  24  |  0.75    Ca    8.7      05 Sep 2022 00:23  Phos  3.3     09-05  Mg     1.9     09-05    TPro  6.9  /  Alb  3.3  /  TBili  0.8  /  DBili  x   /  AST  39  /  ALT  172<H>  /  AlkPhos  59  09-05    PT/INR - ( 05 Sep 2022 00:23 )   PT: 13.0 sec;   INR: 1.13 ratio         PTT - ( 05 Sep 2022 00:23 )  PTT:117.2 sec      MICROBIOLOGY:  Culture - Sputum . (08.31.22 @ 20:45)    Gram Stain:   Moderate polymorphonuclear leukocytes per low power field  Rare Squamous epithelial cells per low power field  Moderate Gram positive cocci in pairs seen per oil power field  Few Gram Negative Rods seen per oil power field    Specimen Source: .Sputum Sputum    Culture Results:   Normal Respiratory Laura present    Culture - Blood (08.31.22 @ 00:12)    Specimen Source: .Blood Blood-Peripheral    Culture Results:   No growth to date.    Culture - Blood (08.31.22 @ 00:12)    Specimen Source: .Blood Blood-Peripheral    Culture Results:   No growth to date.      Pending report for 9/4 result of L groin duplex .  Pending report for 9/3 results of L groin duplex .      < from: US Duplex Arterial Lower Ext Ltd, Left (09.02.22 @ 14:21) >  ROCEDURE DATE:  09/02/2022          INTERPRETATION:  CLINICAL INFORMATION: Status post catheter directed   thrombus aspiration on 8/31/2022. Left femoral arterial access. Groin   pain.    COMPARISON: None.    TECHNIQUE:  Sonography of the left groin was performed using grayscale,   color, and spectral Doppler.    FINDINGS:  A focus of extravascular flow is visualized superficial to and arising   from the left common femoral artery, compatible with pseudoaneurysm. The   pseudoaneurysm measures 3.4 x 2.1 x 2.3 cm. The neck measures 7 mm in   length and 3 mm in width.    Additional adjacent small hypoechoic focus without associated vascularity   measuring 1.0 x 0.7 x 1.0 cm, likely a small hematoma.    The left common femoral artery and veins are patent with normal waveforms.    IMPRESSION:  Left common femoral artery pseudoaneurysm measuring 3.4 x 2.1 x 2.3 cm.    < end of copied text >

## 2022-09-05 NOTE — PROGRESS NOTE ADULT - PROBLEM SELECTOR PLAN 2
- weaned off Marcela 9/2  - c/w Sildenafil, Dr. Dr. Yoon following  - Possible plan for IV Flolan per Pulm

## 2022-09-05 NOTE — PROGRESS NOTE ADULT - PROBLEM SELECTOR PLAN 6
-Would recommend device interrogation this admission (not overnight or on weekend unless patient has arrhythmia).

## 2022-09-05 NOTE — PROGRESS NOTE ADULT - PROBLEM SELECTOR PLAN 1
-  at 2.5  - K>4, Mg >2  - Monitor I/O, daily weights   - diuretics held, euvolemic    - c/w spironolactone  - Daily standing weights. - c/w  at 2.5, can wean over next day  - K>4, Mg >2  - Monitor I/O, daily weights   - diuretics held, euvolemic    - c/w spironolactone  - Daily standing weights.

## 2022-09-06 LAB
ALBUMIN SERPL ELPH-MCNC: 3.6 G/DL — SIGNIFICANT CHANGE UP (ref 3.3–5)
ALP SERPL-CCNC: 55 U/L — SIGNIFICANT CHANGE UP (ref 40–120)
ALT FLD-CCNC: 127 U/L — HIGH (ref 10–45)
ANION GAP SERPL CALC-SCNC: 8 MMOL/L — SIGNIFICANT CHANGE UP (ref 5–17)
APTT BLD: 81.9 SEC — HIGH (ref 27.5–35.5)
AST SERPL-CCNC: 29 U/L — SIGNIFICANT CHANGE UP (ref 10–40)
BASE EXCESS BLDV CALC-SCNC: 2.8 MMOL/L — SIGNIFICANT CHANGE UP (ref -2–3)
BASE EXCESS BLDV CALC-SCNC: 5.3 MMOL/L — HIGH (ref -2–3)
BILIRUB SERPL-MCNC: 0.7 MG/DL — SIGNIFICANT CHANGE UP (ref 0.2–1.2)
BUN SERPL-MCNC: 12 MG/DL — SIGNIFICANT CHANGE UP (ref 7–23)
CA-I SERPL-SCNC: 1.18 MMOL/L — SIGNIFICANT CHANGE UP (ref 1.15–1.33)
CA-I SERPL-SCNC: 1.19 MMOL/L — SIGNIFICANT CHANGE UP (ref 1.15–1.33)
CALCIUM SERPL-MCNC: 8.6 MG/DL — SIGNIFICANT CHANGE UP (ref 8.4–10.5)
CHLORIDE BLDV-SCNC: 97 MMOL/L — SIGNIFICANT CHANGE UP (ref 96–108)
CHLORIDE BLDV-SCNC: 98 MMOL/L — SIGNIFICANT CHANGE UP (ref 96–108)
CHLORIDE SERPL-SCNC: 97 MMOL/L — SIGNIFICANT CHANGE UP (ref 96–108)
CO2 BLDV-SCNC: 28 MMOL/L — HIGH (ref 22–26)
CO2 BLDV-SCNC: 31 MMOL/L — HIGH (ref 22–26)
CO2 SERPL-SCNC: 28 MMOL/L — SIGNIFICANT CHANGE UP (ref 22–31)
CREAT SERPL-MCNC: 0.82 MG/DL — SIGNIFICANT CHANGE UP (ref 0.5–1.3)
EGFR: 92 ML/MIN/1.73M2 — SIGNIFICANT CHANGE UP
GAS PNL BLDV: 130 MMOL/L — LOW (ref 136–145)
GAS PNL BLDV: 131 MMOL/L — LOW (ref 136–145)
GAS PNL BLDV: SIGNIFICANT CHANGE UP
GLUCOSE BLDV-MCNC: 170 MG/DL — HIGH (ref 70–99)
GLUCOSE BLDV-MCNC: 87 MG/DL — SIGNIFICANT CHANGE UP (ref 70–99)
GLUCOSE SERPL-MCNC: 92 MG/DL — SIGNIFICANT CHANGE UP (ref 70–99)
HCO3 BLDV-SCNC: 27 MMOL/L — SIGNIFICANT CHANGE UP (ref 22–29)
HCO3 BLDV-SCNC: 30 MMOL/L — HIGH (ref 22–29)
HCT VFR BLD CALC: 35.1 % — SIGNIFICANT CHANGE UP (ref 34.5–45)
HCT VFR BLDA CALC: 34 % — LOW (ref 34.5–46.5)
HCT VFR BLDA CALC: 34 % — LOW (ref 34.5–46.5)
HGB BLD CALC-MCNC: 11.3 G/DL — LOW (ref 11.7–16.1)
HGB BLD CALC-MCNC: 11.4 G/DL — LOW (ref 11.7–16.1)
HGB BLD-MCNC: 11 G/DL — LOW (ref 11.5–15.5)
HOROWITZ INDEX BLDV+IHG-RTO: 21 — SIGNIFICANT CHANGE UP
INR BLD: 1.05 RATIO — SIGNIFICANT CHANGE UP (ref 0.88–1.16)
LACTATE BLDV-MCNC: 0.7 MMOL/L — SIGNIFICANT CHANGE UP (ref 0.5–2)
LACTATE BLDV-MCNC: 2 MMOL/L — SIGNIFICANT CHANGE UP (ref 0.5–2)
MAGNESIUM SERPL-MCNC: 2 MG/DL — SIGNIFICANT CHANGE UP (ref 1.6–2.6)
MCHC RBC-ENTMCNC: 22.4 PG — LOW (ref 27–34)
MCHC RBC-ENTMCNC: 31.3 GM/DL — LOW (ref 32–36)
MCV RBC AUTO: 71.3 FL — LOW (ref 80–100)
NRBC # BLD: 0 /100 WBCS — SIGNIFICANT CHANGE UP (ref 0–0)
PCO2 BLDV: 40 MMHG — SIGNIFICANT CHANGE UP (ref 39–42)
PCO2 BLDV: 43 MMHG — HIGH (ref 39–42)
PH BLDV: 7.44 — HIGH (ref 7.32–7.43)
PH BLDV: 7.45 — HIGH (ref 7.32–7.43)
PHOSPHATE SERPL-MCNC: 4.4 MG/DL — SIGNIFICANT CHANGE UP (ref 2.5–4.5)
PLATELET # BLD AUTO: 238 K/UL — SIGNIFICANT CHANGE UP (ref 150–400)
PO2 BLDV: 43 MMHG — SIGNIFICANT CHANGE UP (ref 25–45)
PO2 BLDV: 45 MMHG — SIGNIFICANT CHANGE UP (ref 25–45)
POTASSIUM BLDV-SCNC: 4.1 MMOL/L — SIGNIFICANT CHANGE UP (ref 3.5–5.1)
POTASSIUM BLDV-SCNC: 4.7 MMOL/L — SIGNIFICANT CHANGE UP (ref 3.5–5.1)
POTASSIUM SERPL-MCNC: 4.1 MMOL/L — SIGNIFICANT CHANGE UP (ref 3.5–5.3)
POTASSIUM SERPL-SCNC: 4.1 MMOL/L — SIGNIFICANT CHANGE UP (ref 3.5–5.3)
PROT SERPL-MCNC: 6.8 G/DL — SIGNIFICANT CHANGE UP (ref 6–8.3)
PROTHROM AB SERPL-ACNC: 12.2 SEC — SIGNIFICANT CHANGE UP (ref 10.5–13.4)
RBC # BLD: 4.92 M/UL — SIGNIFICANT CHANGE UP (ref 3.8–5.2)
RBC # FLD: 17.7 % — HIGH (ref 10.3–14.5)
SAO2 % BLDV: 75.2 % — SIGNIFICANT CHANGE UP (ref 67–88)
SAO2 % BLDV: 77.2 % — SIGNIFICANT CHANGE UP (ref 67–88)
SODIUM SERPL-SCNC: 133 MMOL/L — LOW (ref 135–145)
WBC # BLD: 15.45 K/UL — HIGH (ref 3.8–10.5)
WBC # FLD AUTO: 15.45 K/UL — HIGH (ref 3.8–10.5)

## 2022-09-06 PROCEDURE — 99232 SBSQ HOSP IP/OBS MODERATE 35: CPT

## 2022-09-06 PROCEDURE — 99233 SBSQ HOSP IP/OBS HIGH 50: CPT

## 2022-09-06 PROCEDURE — 93926 LOWER EXTREMITY STUDY: CPT | Mod: 26,LT

## 2022-09-06 PROCEDURE — 99291 CRITICAL CARE FIRST HOUR: CPT

## 2022-09-06 RX ADMIN — Medication 20 MILLIGRAM(S): at 05:10

## 2022-09-06 RX ADMIN — Medication 7.82 MICROGRAM(S)/KG/MIN: at 07:23

## 2022-09-06 RX ADMIN — PANTOPRAZOLE SODIUM 40 MILLIGRAM(S): 20 TABLET, DELAYED RELEASE ORAL at 07:55

## 2022-09-06 RX ADMIN — Medication 650 MILLIGRAM(S): at 23:36

## 2022-09-06 RX ADMIN — Medication 650 MILLIGRAM(S): at 02:00

## 2022-09-06 RX ADMIN — POLYETHYLENE GLYCOL 3350 17 GRAM(S): 17 POWDER, FOR SOLUTION ORAL at 17:43

## 2022-09-06 RX ADMIN — Medication 10 MILLIGRAM(S): at 05:10

## 2022-09-06 RX ADMIN — PREGABALIN 1000 MICROGRAM(S): 225 CAPSULE ORAL at 12:29

## 2022-09-06 RX ADMIN — Medication 500 MICROGRAM(S): at 03:31

## 2022-09-06 RX ADMIN — HEPARIN SODIUM 12 UNIT(S)/HR: 5000 INJECTION INTRAVENOUS; SUBCUTANEOUS at 07:23

## 2022-09-06 RX ADMIN — SENNA PLUS 2 TABLET(S): 8.6 TABLET ORAL at 22:00

## 2022-09-06 RX ADMIN — SPIRONOLACTONE 25 MILLIGRAM(S): 25 TABLET, FILM COATED ORAL at 06:15

## 2022-09-06 RX ADMIN — Medication 20 MILLIGRAM(S): at 13:03

## 2022-09-06 RX ADMIN — Medication 650 MILLIGRAM(S): at 04:00

## 2022-09-06 RX ADMIN — Medication 500 MICROGRAM(S): at 21:10

## 2022-09-06 RX ADMIN — CHLORHEXIDINE GLUCONATE 1 APPLICATION(S): 213 SOLUTION TOPICAL at 05:11

## 2022-09-06 RX ADMIN — Medication 20 MILLIGRAM(S): at 22:00

## 2022-09-06 NOTE — PROGRESS NOTE ADULT - PROBLEM SELECTOR PLAN 3
- weaned off Marcela 9/2  - c/w Sildenafil, Dr. Dr. Yoon following  - Possible plan for IV Flolan per Pulm  - Would reconsider lung transplant

## 2022-09-06 NOTE — PROGRESS NOTE ADULT - SUBJECTIVE AND OBJECTIVE BOX
INTERVAL HPI/OVERNIGHT EVENTS:    Patient is a 42y old  Female who presents with a chief complaint of Palpitations (05 Sep 2022 09:55)    42 yr old female with PMHx of PulmHTN class I/VI (dx 2014) on continuous Treprostinil (Remodulin) (sildenafil - currently on hold) c/b Rt heart failure s/p PPM (Dual chamber 2016), Chronic P.E. on rivaroxaban (dx 2017), SVT s/p ablation (5/2020), Asthma (chronic steroid use - prednisone), Fe deficiency anemia who originally admitted to medicine floor  8/28/22 with c/o of palpitations accompanied by orthopnea/N/V/RUQ abd pain over a 2 day period.  This short hospital course c/b hypotension with SBP 80/41 accompanied by chest pain, transaminitis, EDOUARD with peak sCr 3.05 (now resolved, baseline sCr of .90-1.24) requiring transfer to MICU evening 8/28/22 for hypotension in setting of acute on chronic cor pulmonale/pulmHTN with end organ dysfunction (EDOUARD, transaminitis). Pt placed on dobutamine/diuretic/Marcela therapies. Course further c/b TTE (8/31/22) finding of new echogenic mass in RA concerning for clot in transit, CTA chest (8/31/22) re-demonstrated severe dilatation of Pulm arteries, without new P.E., received catheter - directed thrombus aspiration (see note interventional Cards 8/31/22).  Marcela weaned off 9/2. Left common femoral artery pseudoaneurysm was thrombin injected by vascular 9/3 was successful. Heparin gtt was resumed ON . Weaned off fio2 ; maintain 02sat > 94%  . C/w nasal moisturizer; no active bleeding . Tolerating increase sildenafil dose.      ROS:  CONSTITUTIONAL:  Negative fever or chills, feels well, good appetite  EYES:  Negative  blurry vision or double vision  CARDIOVASCULAR:  Negative for chest pain or palpitations  RESPIRATORY:  Negative for cough, wheezing, or SOB   GASTROINTESTINAL:  Negative for nausea, vomiting, diarrhea, constipation, or abdominal pain  GENITOURINARY:  Negative frequency, urgency or dysuria  NEUROLOGIC:  No headache, confusion, dizziness, lightheadedness    Allergies    adhesives (Rash)  penicillin (Rash)  penicillin (Unknown)  vancomycin (Rash)  vancomycin (Unknown)    Intolerances    albuterol (Other; Rash)      ANTIBIOTICS/RELEVANT:  antimicrobials    immunologic:    OTHER:  acetaminophen     Tablet .. 650 milliGRAM(s) Oral every 6 hours PRN  aluminum hydroxide/magnesium hydroxide/simethicone Suspension 30 milliLiter(s) Oral every 4 hours PRN  artificial  tears Solution 1 Drop(s) Both EYES three times a day PRN  chlorhexidine 4% Liquid 1 Application(s) Topical <User Schedule>  cyanocobalamin 1000 MICROGram(s) Oral daily  DOBUTamine Infusion 2.5 MICROgram(s)/kG/Min IV Continuous <Continuous>  heparin  Infusion 1200 Unit(s)/Hr IV Continuous <Continuous>  HYDROmorphone  Injectable 0.5 milliGRAM(s) IV Push every 4 hours PRN  ipratropium    for Nebulization 500 MICROGram(s) Nebulizer every 6 hours PRN  melatonin 3 milliGRAM(s) Oral at bedtime PRN  ondansetron Injectable 4 milliGRAM(s) IV Push every 8 hours PRN  pantoprazole    Tablet 40 milliGRAM(s) Oral before breakfast  polyethylene glycol 3350 17 Gram(s) Oral every 12 hours  predniSONE   Tablet 10 milliGRAM(s) Oral daily  Remodulin (Treprostinil) 5mG/mL 1 Vial(s) 1 Vial(s) SubCutaneous Continuous Pump  senna 2 Tablet(s) Oral at bedtime  sildenafil (REVATIO) 20 milliGRAM(s) Oral <User Schedule>  sodium chloride 0.65% Nasal 1 Spray(s) Both Nostrils every 6 hours PRN  sodium chloride 0.9% lock flush 10 milliLiter(s) IV Push every 1 hour PRN  spironolactone 25 milliGRAM(s) Oral daily      Objective:  Vital Signs Last 24 Hrs  T(C): 36.6 (06 Sep 2022 04:00), Max: 36.7 (05 Sep 2022 09:00)  T(F): 97.9 (06 Sep 2022 04:00), Max: 98.1 (05 Sep 2022 09:00)  HR: 99 (06 Sep 2022 05:00) (96 - 111)  BP: --  BP(mean): --  RR: 18 (06 Sep 2022 05:00) (0 - 34)  SpO2: 97% (06 Sep 2022 05:00) (90% - 100%)    Parameters below as of 06 Sep 2022 04:00  Patient On (Oxygen Delivery Method): room air        PHYSICAL EXAM:   CONSTITUTIONAL: + dry nose w/ bloody mucus .  Negative fever or chills, feels well, good appetite  EYES:  Negative  blurry vision or double vision  CARDIOVASCULAR:  Negative for chest pain or palpitations  RESPIRATORY:  Negative for cough, wheezing, or SOB   GASTROINTESTINAL:  Negative for nausea, vomiting, diarrhea, constipation, or abdominal pain  GENITOURINARY:  Negative frequency, urgency or dysuria  NEUROLOGIC:  No headache, confusion, dizziness, lightheadedness    LABS:                        11.0   15.45 )-----------( 238      ( 06 Sep 2022 00:10 )             35.1     09-06    133<L>  |  97  |  12  ----------------------------<  92  4.1   |  28  |  0.82    Ca    8.6      06 Sep 2022 00:10  Phos  4.4     09-06  Mg     2.0     09-06    TPro  6.8  /  Alb  3.6  /  TBili  0.7  /  DBili  x   /  AST  29  /  ALT  127<H>  /  AlkPhos  55  09-06    PT/INR - ( 06 Sep 2022 00:10 )   PT: 12.2 sec;   INR: 1.05 ratio         PTT - ( 06 Sep 2022 00:10 )  PTT:81.9 sec          MICROBIOLOGY:  Culture - Sputum . (08.31.22 @ 20:45)    Gram Stain:   Moderate polymorphonuclear leukocytes per low power field  Rare Squamous epithelial cells per low power field  Moderate Gram positive cocci in pairs seen per oil power field  Few Gram Negative Rods seen per oil power field    Specimen Source: .Sputum Sputum    Culture Results:   Normal Respiratory Laura present    Culture - Blood (08.31.22 @ 00:12)    Specimen Source: .Blood Blood-Peripheral    Culture Results:   No growth to date.    Culture - Blood (08.31.22 @ 00:12)    Specimen Source: .Blood Blood-Peripheral    Culture Results:   No growth to date.      Pending report for 9/4 result of L groin duplex .  Pending report for 9/3 results of L groin duplex .      < from: US Duplex Arterial Lower Ext Ltd, Left (09.02.22 @ 14:21) >  ROCEDURE DATE:  09/02/2022          INTERPRETATION:  CLINICAL INFORMATION: Status post catheter directed   thrombus aspiration on 8/31/2022. Left femoral arterial access. Groin   pain.    COMPARISON: None.    TECHNIQUE:  Sonography of the left groin was performed using grayscale,   color, and spectral Doppler.    FINDINGS:  A focus of extravascular flow is visualized superficial to and arising   from the left common femoral artery, compatible with pseudoaneurysm. The   pseudoaneurysm measures 3.4 x 2.1 x 2.3 cm. The neck measures 7 mm in   length and 3 mm in width.    Additional adjacent small hypoechoic focus without associated vascularity   measuring 1.0 x 0.7 x 1.0 cm, likely a small hematoma.    The left common femoral artery and veins are patent with normal waveforms.    IMPRESSION:  Left common femoral artery pseudoaneurysm measuring 3.4 x 2.1 x 2.3 cm.    < end of copied text >     INTERVAL HPI/OVERNIGHT EVENTS:    Patient is a 42y old  Female who presents with a chief complaint of Palpitations (05 Sep 2022 09:55)    42 yr old female with PMHx of PulmHTN class I/VI (dx 2014) on continuous Treprostinil (Remodulin) (sildenafil - currently on hold) c/b Rt heart failure s/p PPM (Dual chamber 2016), Chronic P.E. on rivaroxaban (dx 2017), SVT s/p ablation (5/2020), Asthma (chronic steroid use - prednisone), Fe deficiency anemia who originally admitted to medicine floor  8/28/22 with c/o of palpitations accompanied by orthopnea/N/V/RUQ abd pain over a 2 day period.  This short hospital course c/b hypotension with SBP 80/41 accompanied by chest pain, transaminitis, EDOUARD with peak sCr 3.05 (now resolved, baseline sCr of .90-1.24) requiring transfer to MICU evening 8/28/22 for hypotension in setting of acute on chronic cor pulmonale/pulmHTN with end organ dysfunction (EDOUARD, transaminitis). Pt placed on dobutamine/diuretic/Marcela therapies. Course further c/b TTE (8/31/22) finding of new echogenic mass in RA concerning for clot in transit, CTA chest (8/31/22) re-demonstrated severe dilatation of Pulm arteries, without new P.E., received catheter - directed thrombus aspiration (see note interventional Cards 8/31/22).  Marcela weaned off 9/2. Left common femoral artery pseudoaneurysm was thrombin injected by vascular 9/3 was successful. Heparin gtt was resumed ON . Weaned off fio2 ; maintain 02sat > 94%  . C/w nasal moisturizer; no active bleeding . Tolerating increase sildenafil dose.      ROS:  CONSTITUTIONAL:  Negative fever or chills, feels well, good appetite  EYES:  Negative  blurry vision or double vision  CARDIOVASCULAR:  Negative for chest pain or palpitations  RESPIRATORY:  Negative for cough, wheezing, or SOB   GASTROINTESTINAL:  Negative for nausea, vomiting, diarrhea, constipation, or abdominal pain  GENITOURINARY:  Negative frequency, urgency or dysuria  NEUROLOGIC:  No headache, confusion, dizziness, lightheadedness    Allergies    adhesives (Rash)  penicillin (Rash)  penicillin (Unknown)  vancomycin (Rash)  vancomycin (Unknown)    Intolerances    albuterol (Other; Rash)      ANTIBIOTICS/RELEVANT:  antimicrobials    immunologic:    OTHER:  acetaminophen     Tablet .. 650 milliGRAM(s) Oral every 6 hours PRN  aluminum hydroxide/magnesium hydroxide/simethicone Suspension 30 milliLiter(s) Oral every 4 hours PRN  artificial  tears Solution 1 Drop(s) Both EYES three times a day PRN  chlorhexidine 4% Liquid 1 Application(s) Topical <User Schedule>  cyanocobalamin 1000 MICROGram(s) Oral daily  DOBUTamine Infusion 2.5 MICROgram(s)/kG/Min IV Continuous <Continuous>  heparin  Infusion 1200 Unit(s)/Hr IV Continuous <Continuous>  HYDROmorphone  Injectable 0.5 milliGRAM(s) IV Push every 4 hours PRN  ipratropium    for Nebulization 500 MICROGram(s) Nebulizer every 6 hours PRN  melatonin 3 milliGRAM(s) Oral at bedtime PRN  ondansetron Injectable 4 milliGRAM(s) IV Push every 8 hours PRN  pantoprazole    Tablet 40 milliGRAM(s) Oral before breakfast  polyethylene glycol 3350 17 Gram(s) Oral every 12 hours  predniSONE   Tablet 10 milliGRAM(s) Oral daily  Remodulin (Treprostinil) 5mG/mL 1 Vial(s) 1 Vial(s) SubCutaneous Continuous Pump  senna 2 Tablet(s) Oral at bedtime  sildenafil (REVATIO) 20 milliGRAM(s) Oral <User Schedule>  sodium chloride 0.65% Nasal 1 Spray(s) Both Nostrils every 6 hours PRN  sodium chloride 0.9% lock flush 10 milliLiter(s) IV Push every 1 hour PRN  spironolactone 25 milliGRAM(s) Oral daily      Objective:  Vital Signs Last 24 Hrs  T(C): 36.6 (06 Sep 2022 04:00), Max: 36.7 (05 Sep 2022 09:00)  T(F): 97.9 (06 Sep 2022 04:00), Max: 98.1 (05 Sep 2022 09:00)  HR: 99 (06 Sep 2022 05:00) (96 - 111)  BP: --  BP(mean): --  RR: 18 (06 Sep 2022 05:00) (0 - 34)  SpO2: 97% (06 Sep 2022 05:00) (90% - 100%)    Parameters below as of 06 Sep 2022 04:00  Patient On (Oxygen Delivery Method): room air        PHYSICAL EXAM:   CONSTITUTIONAL: + dry nose w/ bloody mucus .  Negative fever or chills, feels well, good appetite  EYES:  Negative  blurry vision or double vision  CARDIOVASCULAR:  Negative for chest pain or palpitations  RESPIRATORY:  Negative for cough, wheezing, or SOB   GASTROINTESTINAL:  Negative for nausea, vomiting, diarrhea, constipation, or abdominal pain  GENITOURINARY:  Negative frequency, urgency or dysuria  NEUROLOGIC:  No headache, confusion, dizziness, lightheadedness    LABS:                        11.0   15.45 )-----------( 238      ( 06 Sep 2022 00:10 )             35.1     09-06    133<L>  |  97  |  12  ----------------------------<  92  4.1   |  28  |  0.82    Ca    8.6      06 Sep 2022 00:10  Phos  4.4     09-06  Mg     2.0     09-06    TPro  6.8  /  Alb  3.6  /  TBili  0.7  /  DBili  x   /  AST  29  /  ALT  127<H>  /  AlkPhos  55  09-06    PT/INR - ( 06 Sep 2022 00:10 )   PT: 12.2 sec;   INR: 1.05 ratio         PTT - ( 06 Sep 2022 00:10 )  PTT:81.9 sec          MICROBIOLOGY:  Culture - Sputum . (08.31.22 @ 20:45)    Gram Stain:   Moderate polymorphonuclear leukocytes per low power field  Rare Squamous epithelial cells per low power field  Moderate Gram positive cocci in pairs seen per oil power field  Few Gram Negative Rods seen per oil power field    Specimen Source: .Sputum Sputum    Culture Results:   Normal Respiratory Laura present    Culture - Blood (08.31.22 @ 00:12)    Specimen Source: .Blood Blood-Peripheral    Culture Results:   No growth to date.    Culture - Blood (08.31.22 @ 00:12)    Specimen Source: .Blood Blood-Peripheral    Culture Results:   No growth to date.        Pending report for 9/3 results of L groin duplex .      < from:  Duplex Arterial Lower Ext Ltd, Left (09.02.22 @ 14:21) >  ROCEDURE DATE:  09/02/2022      < from:  Duplex Arterial Lower Ext Ltd, Left (09.04.22 @ 11:22) >  PROCEDURE DATE:  09/04/2022          INTERPRETATION:  History: A 3.4 cm left common femoral pseudoaneurysm was   identified on 9/2/2022, follow-up examination following thrombin   injection.    There is a 4 cm thrombosed hematoma.    A small residual nubbin communicates with the underlying left common   femoral artery.    Impression: Following thrombin injection the pseudoaneurysm is thrombosed.  There remains a small nubbin communicating with the left common femoral   artery.    < end of copied text >      INTERPRETATION:  CLINICAL INFORMATION: Status post catheter directed   thrombus aspiration on 8/31/2022. Left femoral arterial access. Groin   pain.    COMPARISON: None.    TECHNIQUE:  Sonography of the left groin was performed using grayscale,   color, and spectral Doppler.    FINDINGS:  A focus of extravascular flow is visualized superficial to and arising   from the left common femoral artery, compatible with pseudoaneurysm. The   pseudoaneurysm measures 3.4 x 2.1 x 2.3 cm. The neck measures 7 mm in   length and 3 mm in width.    Additional adjacent small hypoechoic focus without associated vascularity   measuring 1.0 x 0.7 x 1.0 cm, likely a small hematoma.    The left common femoral artery and veins are patent with normal waveforms.    IMPRESSION:  Left common femoral artery pseudoaneurysm measuring 3.4 x 2.1 x 2.3 cm.    < end of copied text >     INTERVAL HPI/OVERNIGHT EVENTS:    Patient is a 42y old  Female who presents with a chief complaint of Palpitations (05 Sep 2022 09:55)    42 yr old female with PMHx of PulmHTN class I/VI (dx 2014) on continuous Treprostinil (Remodulin) (sildenafil - currently on hold) c/b Rt heart failure s/p PPM (Dual chamber 2016), Chronic P.E. on rivaroxaban (dx 2017), SVT s/p ablation (5/2020), Asthma (chronic steroid use - prednisone), Fe deficiency anemia who originally admitted to medicine floor  8/28/22 with c/o of palpitations accompanied by orthopnea/N/V/RUQ abd pain over a 2 day period.  This short hospital course c/b hypotension with SBP 80/41 accompanied by chest pain, transaminitis, EDOUARD with peak sCr 3.05 (now resolved, baseline sCr of .90-1.24) requiring transfer to MICU evening 8/28/22 for hypotension in setting of acute on chronic cor pulmonale/pulmHTN with end organ dysfunction (EDOUARD, transaminitis). Pt placed on dobutamine/diuretic/Marcela therapies. Course further c/b TTE (8/31/22) finding of new echogenic mass in RA concerning for clot in transit, CTA chest (8/31/22) re-demonstrated severe dilatation of Pulm arteries, without new P.E., received catheter - directed thrombus aspiration (see note interventional Cards 8/31/22).  Marcela weaned off 9/2. Left common femoral artery pseudoaneurysm was thrombin injected by vascular 9/3 was successful. Heparin gtt was resumed ON . Weaned off fio2 ; maintain 02sat > 94%  . C/w nasal moisturizer; no active bleeding . Tolerating increase sildenafil dose. Dobutamine gtt wean .     ROS:  CONSTITUTIONAL:  Negative fever or chills, feels well, good appetite  EYES:  Negative  blurry vision or double vision  CARDIOVASCULAR:  Negative for chest pain or palpitations  RESPIRATORY:  Negative for cough, wheezing, or SOB   GASTROINTESTINAL:  Negative for nausea, vomiting, diarrhea, constipation, or abdominal pain  GENITOURINARY:  Negative frequency, urgency or dysuria  NEUROLOGIC:  No headache, confusion, dizziness, lightheadedness    Allergies    adhesives (Rash)  penicillin (Rash)  penicillin (Unknown)  vancomycin (Rash)  vancomycin (Unknown)    Intolerances    albuterol (Other; Rash)      ANTIBIOTICS/RELEVANT:  antimicrobials    immunologic:    OTHER:  acetaminophen     Tablet .. 650 milliGRAM(s) Oral every 6 hours PRN  aluminum hydroxide/magnesium hydroxide/simethicone Suspension 30 milliLiter(s) Oral every 4 hours PRN  artificial  tears Solution 1 Drop(s) Both EYES three times a day PRN  chlorhexidine 4% Liquid 1 Application(s) Topical <User Schedule>  cyanocobalamin 1000 MICROGram(s) Oral daily  DOBUTamine Infusion 2.5 MICROgram(s)/kG/Min IV Continuous <Continuous>  heparin  Infusion 1200 Unit(s)/Hr IV Continuous <Continuous>  HYDROmorphone  Injectable 0.5 milliGRAM(s) IV Push every 4 hours PRN  ipratropium    for Nebulization 500 MICROGram(s) Nebulizer every 6 hours PRN  melatonin 3 milliGRAM(s) Oral at bedtime PRN  ondansetron Injectable 4 milliGRAM(s) IV Push every 8 hours PRN  pantoprazole    Tablet 40 milliGRAM(s) Oral before breakfast  polyethylene glycol 3350 17 Gram(s) Oral every 12 hours  predniSONE   Tablet 10 milliGRAM(s) Oral daily  Remodulin (Treprostinil) 5mG/mL 1 Vial(s) 1 Vial(s) SubCutaneous Continuous Pump  senna 2 Tablet(s) Oral at bedtime  sildenafil (REVATIO) 20 milliGRAM(s) Oral <User Schedule>  sodium chloride 0.65% Nasal 1 Spray(s) Both Nostrils every 6 hours PRN  sodium chloride 0.9% lock flush 10 milliLiter(s) IV Push every 1 hour PRN  spironolactone 25 milliGRAM(s) Oral daily      Objective:  Vital Signs Last 24 Hrs  T(C): 36.6 (06 Sep 2022 04:00), Max: 36.7 (05 Sep 2022 09:00)  T(F): 97.9 (06 Sep 2022 04:00), Max: 98.1 (05 Sep 2022 09:00)  HR: 99 (06 Sep 2022 05:00) (96 - 111)  BP: --  BP(mean): --  RR: 18 (06 Sep 2022 05:00) (0 - 34)  SpO2: 97% (06 Sep 2022 05:00) (90% - 100%)    Parameters below as of 06 Sep 2022 04:00  Patient On (Oxygen Delivery Method): room air        PHYSICAL EXAM:   CONSTITUTIONAL: + dry nose w/ bloody mucus .  Negative fever or chills, feels well, good appetite  EYES:  Negative  blurry vision or double vision  CARDIOVASCULAR:  Negative for chest pain or palpitations  RESPIRATORY:  Negative for cough, wheezing, or SOB   GASTROINTESTINAL:  Negative for nausea, vomiting, diarrhea, constipation, or abdominal pain  GENITOURINARY:  Negative frequency, urgency or dysuria  NEUROLOGIC:  No headache, confusion, dizziness, lightheadedness    LABS:                        11.0   15.45 )-----------( 238      ( 06 Sep 2022 00:10 )             35.1     09-06    133<L>  |  97  |  12  ----------------------------<  92  4.1   |  28  |  0.82    Ca    8.6      06 Sep 2022 00:10  Phos  4.4     09-06  Mg     2.0     09-06    TPro  6.8  /  Alb  3.6  /  TBili  0.7  /  DBili  x   /  AST  29  /  ALT  127<H>  /  AlkPhos  55  09-06    PT/INR - ( 06 Sep 2022 00:10 )   PT: 12.2 sec;   INR: 1.05 ratio         PTT - ( 06 Sep 2022 00:10 )  PTT:81.9 sec          MICROBIOLOGY:  Culture - Sputum . (08.31.22 @ 20:45)    Gram Stain:   Moderate polymorphonuclear leukocytes per low power field  Rare Squamous epithelial cells per low power field  Moderate Gram positive cocci in pairs seen per oil power field  Few Gram Negative Rods seen per oil power field    Specimen Source: .Sputum Sputum    Culture Results:   Normal Respiratory Laura present    Culture - Blood (08.31.22 @ 00:12)    Specimen Source: .Blood Blood-Peripheral    Culture Results:   No growth to date.    Culture - Blood (08.31.22 @ 00:12)    Specimen Source: .Blood Blood-Peripheral    Culture Results:   No growth to date.        Pending report for 9/3 results of L groin duplex .      < from:  Duplex Arterial Lower Ext Ltd, Left (09.02.22 @ 14:21) >  ROCEDURE DATE:  09/02/2022      < from:  Duplex Arterial Lower Ext Ltd, Left (09.04.22 @ 11:22) >  PROCEDURE DATE:  09/04/2022          INTERPRETATION:  History: A 3.4 cm left common femoral pseudoaneurysm was   identified on 9/2/2022, follow-up examination following thrombin   injection.    There is a 4 cm thrombosed hematoma.    A small residual nubbin communicates with the underlying left common   femoral artery.    Impression: Following thrombin injection the pseudoaneurysm is thrombosed.  There remains a small nubbin communicating with the left common femoral   artery.    < end of copied text >      INTERPRETATION:  CLINICAL INFORMATION: Status post catheter directed   thrombus aspiration on 8/31/2022. Left femoral arterial access. Groin   pain.    COMPARISON: None.    TECHNIQUE:  Sonography of the left groin was performed using grayscale,   color, and spectral Doppler.    FINDINGS:  A focus of extravascular flow is visualized superficial to and arising   from the left common femoral artery, compatible with pseudoaneurysm. The   pseudoaneurysm measures 3.4 x 2.1 x 2.3 cm. The neck measures 7 mm in   length and 3 mm in width.    Additional adjacent small hypoechoic focus without associated vascularity   measuring 1.0 x 0.7 x 1.0 cm, likely a small hematoma.    The left common femoral artery and veins are patent with normal waveforms.    IMPRESSION:  Left common femoral artery pseudoaneurysm measuring 3.4 x 2.1 x 2.3 cm.    < end of copied text >     INTERVAL HPI/OVERNIGHT EVENTS:    Patient is a 42y old  Female who presents with a chief complaint of Palpitations (05 Sep 2022 09:55)    42 yr old female with PMHx of PulmHTN class I/VI (dx 2014) on continuous Treprostinil (Remodulin) (sildenafil - currently on hold) c/b Rt heart failure s/p PPM (Dual chamber 2016), Chronic P.E. on rivaroxaban (dx 2017), SVT s/p ablation (5/2020), Asthma (chronic steroid use - prednisone), Fe deficiency anemia who originally admitted to medicine floor  8/28/22 with c/o of palpitations accompanied by orthopnea/N/V/RUQ abd pain over a 2 day period.  This short hospital course c/b hypotension with SBP 80/41 accompanied by chest pain, transaminitis, EDOUARD with peak sCr 3.05 (now resolved, baseline sCr of .90-1.24) requiring transfer to MICU evening 8/28/22 for hypotension in setting of acute on chronic cor pulmonale/pulmHTN with end organ dysfunction (EDOUARD, transaminitis). Pt placed on dobutamine/diuretic/Marcela therapies. Course further c/b TTE (8/31/22) finding of new echogenic mass in RA concerning for clot in transit, CTA chest (8/31/22) re-demonstrated severe dilatation of Pulm arteries, without new P.E., received catheter - directed thrombus aspiration (see note interventional Cards 8/31/22).  Marcela weaned off 9/2. Left common femoral artery pseudoaneurysm was thrombin injected by vascular 9/3 was successful. Heparin gtt was resumed ON . Weaned off fio2 ; maintain 02sat > 94%  . C/w nasal moisturizer; no active bleeding . Tolerating increase sildenafil dose. Dobutamine gtt wean . Will d/w Dr. Yoon regarding further plan of care and may d/c TLC and A line . Addition, 2/2 weight loss may be evaluated by transplant team.     ROS:  CONSTITUTIONAL:  Negative fever or chills, feels well, good appetite  EYES:  Negative  blurry vision or double vision  CARDIOVASCULAR:  Negative for chest pain or palpitations  RESPIRATORY:  Negative for cough, wheezing, or SOB   GASTROINTESTINAL:  Negative for nausea, vomiting, diarrhea, constipation, or abdominal pain  GENITOURINARY:  Negative frequency, urgency or dysuria  NEUROLOGIC:  No headache, confusion, dizziness, lightheadedness    Allergies    adhesives (Rash)  penicillin (Rash)  penicillin (Unknown)  vancomycin (Rash)  vancomycin (Unknown)    Intolerances    albuterol (Other; Rash)      ANTIBIOTICS/RELEVANT:  antimicrobials    immunologic:    OTHER:  acetaminophen     Tablet .. 650 milliGRAM(s) Oral every 6 hours PRN  aluminum hydroxide/magnesium hydroxide/simethicone Suspension 30 milliLiter(s) Oral every 4 hours PRN  artificial  tears Solution 1 Drop(s) Both EYES three times a day PRN  chlorhexidine 4% Liquid 1 Application(s) Topical <User Schedule>  cyanocobalamin 1000 MICROGram(s) Oral daily  DOBUTamine Infusion 2.5 MICROgram(s)/kG/Min IV Continuous <Continuous>  heparin  Infusion 1200 Unit(s)/Hr IV Continuous <Continuous>  HYDROmorphone  Injectable 0.5 milliGRAM(s) IV Push every 4 hours PRN  ipratropium    for Nebulization 500 MICROGram(s) Nebulizer every 6 hours PRN  melatonin 3 milliGRAM(s) Oral at bedtime PRN  ondansetron Injectable 4 milliGRAM(s) IV Push every 8 hours PRN  pantoprazole    Tablet 40 milliGRAM(s) Oral before breakfast  polyethylene glycol 3350 17 Gram(s) Oral every 12 hours  predniSONE   Tablet 10 milliGRAM(s) Oral daily  Remodulin (Treprostinil) 5mG/mL 1 Vial(s) 1 Vial(s) SubCutaneous Continuous Pump  senna 2 Tablet(s) Oral at bedtime  sildenafil (REVATIO) 20 milliGRAM(s) Oral <User Schedule>  sodium chloride 0.65% Nasal 1 Spray(s) Both Nostrils every 6 hours PRN  sodium chloride 0.9% lock flush 10 milliLiter(s) IV Push every 1 hour PRN  spironolactone 25 milliGRAM(s) Oral daily      Objective:  Vital Signs Last 24 Hrs  T(C): 36.6 (06 Sep 2022 04:00), Max: 36.7 (05 Sep 2022 09:00)  T(F): 97.9 (06 Sep 2022 04:00), Max: 98.1 (05 Sep 2022 09:00)  HR: 99 (06 Sep 2022 05:00) (96 - 111)  BP: --  BP(mean): --  RR: 18 (06 Sep 2022 05:00) (0 - 34)  SpO2: 97% (06 Sep 2022 05:00) (90% - 100%)    Parameters below as of 06 Sep 2022 04:00  Patient On (Oxygen Delivery Method): room air        PHYSICAL EXAM:   CONSTITUTIONAL: + dry nose w/ bloody mucus .  Negative fever or chills, feels well, good appetite  EYES:  Negative  blurry vision or double vision  CARDIOVASCULAR:  Negative for chest pain or palpitations  RESPIRATORY:  Negative for cough, wheezing, or SOB   GASTROINTESTINAL:  Negative for nausea, vomiting, diarrhea, constipation, or abdominal pain  GENITOURINARY:  Negative frequency, urgency or dysuria  NEUROLOGIC:  No headache, confusion, dizziness, lightheadedness    LABS:                        11.0   15.45 )-----------( 238      ( 06 Sep 2022 00:10 )             35.1     09-06    133<L>  |  97  |  12  ----------------------------<  92  4.1   |  28  |  0.82    Ca    8.6      06 Sep 2022 00:10  Phos  4.4     09-06  Mg     2.0     09-06    TPro  6.8  /  Alb  3.6  /  TBili  0.7  /  DBili  x   /  AST  29  /  ALT  127<H>  /  AlkPhos  55  09-06    PT/INR - ( 06 Sep 2022 00:10 )   PT: 12.2 sec;   INR: 1.05 ratio         PTT - ( 06 Sep 2022 00:10 )  PTT:81.9 sec          MICROBIOLOGY:  Culture - Sputum . (08.31.22 @ 20:45)    Gram Stain:   Moderate polymorphonuclear leukocytes per low power field  Rare Squamous epithelial cells per low power field  Moderate Gram positive cocci in pairs seen per oil power field  Few Gram Negative Rods seen per oil power field    Specimen Source: .Sputum Sputum    Culture Results:   Normal Respiratory Laura present    Culture - Blood (08.31.22 @ 00:12)    Specimen Source: .Blood Blood-Peripheral    Culture Results:   No growth to date.    Culture - Blood (08.31.22 @ 00:12)    Specimen Source: .Blood Blood-Peripheral    Culture Results:   No growth to date.        Pending report for 9/3 results of L enriquetain duplex .      < from: US Duplex Arterial Lower Ext Ltd, Left (09.02.22 @ 14:21) >  ROCEDURE DATE:  09/02/2022      < from: US Duplex Arterial Lower Ext Ltd, Left (09.04.22 @ 11:22) >  PROCEDURE DATE:  09/04/2022          INTERPRETATION:  History: A 3.4 cm left common femoral pseudoaneurysm was   identified on 9/2/2022, follow-up examination following thrombin   injection.    There is a 4 cm thrombosed hematoma.    A small residual nubbin communicates with the underlying left common   femoral artery.    Impression: Following thrombin injection the pseudoaneurysm is thrombosed.  There remains a small nubbin communicating with the left common femoral   artery.    < end of copied text >      INTERPRETATION:  CLINICAL INFORMATION: Status post catheter directed   thrombus aspiration on 8/31/2022. Left femoral arterial access. Groin   pain.    COMPARISON: None.    TECHNIQUE:  Sonography of the left groin was performed using grayscale,   color, and spectral Doppler.    FINDINGS:  A focus of extravascular flow is visualized superficial to and arising   from the left common femoral artery, compatible with pseudoaneurysm. The   pseudoaneurysm measures 3.4 x 2.1 x 2.3 cm. The neck measures 7 mm in   length and 3 mm in width.    Additional adjacent small hypoechoic focus without associated vascularity   measuring 1.0 x 0.7 x 1.0 cm, likely a small hematoma.    The left common femoral artery and veins are patent with normal waveforms.    IMPRESSION:  Left common femoral artery pseudoaneurysm measuring 3.4 x 2.1 x 2.3 cm.    < end of copied text >

## 2022-09-06 NOTE — PROGRESS NOTE ADULT - ASSESSMENT
42F with PMHx of PulmHTN class I/VI (dx 2014) on continuous Treprostinil (Remodulin) (sildenafil - currently on hold) c/b Rt heart failure s/p PPM (Dual chamber 2016), chronic P.E. on rivaroxaban (dx 2017), SVT s/p ablation (5/2020), Asthma (chronic steroid use - prednisone), Fe deficiency anemia. Underwent catheter - directed thrombus aspiration 8/31 for RA mass concerning for clot in transit. Vascular Surgery consulted for L femoral pseudoaneurysm from PCI, confirmed on US. US guided pressure held and repeat duplex showed resolution of PSA. Duplex today (9/6) revealed hematoma with no PSA    PLAN  - No vascular surgery intervention indicated  - Monitor hematoma  - Page for any further questions      Vascular Surgery  [phone]

## 2022-09-06 NOTE — PROGRESS NOTE ADULT - SUBJECTIVE AND OBJECTIVE BOX
Interval History:  Over weekend the patient was taken of Marcela, started on sildenafil. Still on dobutamine drip. Underwent thrombin injection 9/3    Medications:  acetaminophen     Tablet .. 650 milliGRAM(s) Oral every 6 hours PRN  aluminum hydroxide/magnesium hydroxide/simethicone Suspension 30 milliLiter(s) Oral every 4 hours PRN  artificial  tears Solution 1 Drop(s) Both EYES three times a day PRN  chlorhexidine 4% Liquid 1 Application(s) Topical <User Schedule>  cyanocobalamin 1000 MICROGram(s) Oral daily  DOBUTamine Infusion 2.5 MICROgram(s)/kG/Min IV Continuous <Continuous>  heparin  Infusion 1200 Unit(s)/Hr IV Continuous <Continuous>  HYDROmorphone  Injectable 0.5 milliGRAM(s) IV Push every 4 hours PRN  ipratropium    for Nebulization 500 MICROGram(s) Nebulizer every 6 hours PRN  melatonin 3 milliGRAM(s) Oral at bedtime PRN  ondansetron Injectable 4 milliGRAM(s) IV Push every 8 hours PRN  pantoprazole    Tablet 40 milliGRAM(s) Oral before breakfast  polyethylene glycol 3350 17 Gram(s) Oral every 12 hours  predniSONE   Tablet 10 milliGRAM(s) Oral daily  Remodulin (Treprostinil) 5mG/mL 1 Vial(s) 1 Vial(s) SubCutaneous Continuous Pump  senna 2 Tablet(s) Oral at bedtime  sildenafil (REVATIO) 20 milliGRAM(s) Oral <User Schedule>  sodium chloride 0.65% Nasal 1 Spray(s) Both Nostrils every 6 hours PRN  sodium chloride 0.9% lock flush 10 milliLiter(s) IV Push every 1 hour PRN  spironolactone 25 milliGRAM(s) Oral daily      Vitals:  T(C): 36.7 (22 @ 08:00), Max: 36.7 (22 @ 09:00)  HR: 102 (22 @ 08:00) (96 - 113)  BP: --  BP(mean): --  RR: 27 (22 @ 08:00) (15 - 34)  SpO2: 95% (22 @ 08:00) (91% - 100%)    Daily     Daily Weight in k (06 Sep 2022 04:00)    Weight (kg): 104.3 ( @ 20:00)    I&O's Summary    05 Sep 2022 07:01  -  06 Sep 2022 07:00  --------------------------------------------------------  IN: 1705.2 mL / OUT: 1530 mL / NET: 175.2 mL    06 Sep 2022 07:01  -  06 Sep 2022 08:10  --------------------------------------------------------  IN: 49.8 mL / OUT: 0 mL / NET: 49.8 mL        Physical Exam:  Appearance: No Acute Distress  HEENT: PERRL  Neck: No JVD  Cardiovascular: Normal S1 S2, No murmurs/rubs/gallops  Respiratory: Clear to auscultation bilaterally  Gastrointestinal: Soft, Non-tender	  Skin: No cyanosis	  Neurologic: Non-focal  Extremities: No LE edema  Psychiatry: A & O x 3, Mood & affect appropriate    Labs:                        11.0   15.45 )-----------( 238      ( 06 Sep 2022 00:10 )             35.1     09-06    133<L>  |  97  |  12  ----------------------------<  92  4.1   |  28  |  0.82    Ca    8.6      06 Sep 2022 00:10  Phos  4.4     09-06  Mg     2.0     09-06    TPro  6.8  /  Alb  3.6  /  TBili  0.7  /  DBili  x   /  AST  29  /  ALT  127<H>  /  AlkPhos  55  09-06    PT/INR - ( 06 Sep 2022 00:10 )   PT: 12.2 sec;   INR: 1.05 ratio         PTT - ( 06 Sep 2022 00:10 )  PTT:81.9 sec        TELEMETRY:    Echocardiogram:   Interval History:  Over weekend the patient was taken of Marcela, started on sildenafil. Still on dobutamine drip. Underwent thrombin injection 9/3    Medications:  acetaminophen     Tablet .. 650 milliGRAM(s) Oral every 6 hours PRN  aluminum hydroxide/magnesium hydroxide/simethicone Suspension 30 milliLiter(s) Oral every 4 hours PRN  artificial  tears Solution 1 Drop(s) Both EYES three times a day PRN  chlorhexidine 4% Liquid 1 Application(s) Topical <User Schedule>  cyanocobalamin 1000 MICROGram(s) Oral daily  DOBUTamine Infusion 2.5 MICROgram(s)/kG/Min IV Continuous <Continuous>  heparin  Infusion 1200 Unit(s)/Hr IV Continuous <Continuous>  HYDROmorphone  Injectable 0.5 milliGRAM(s) IV Push every 4 hours PRN  ipratropium    for Nebulization 500 MICROGram(s) Nebulizer every 6 hours PRN  melatonin 3 milliGRAM(s) Oral at bedtime PRN  ondansetron Injectable 4 milliGRAM(s) IV Push every 8 hours PRN  pantoprazole    Tablet 40 milliGRAM(s) Oral before breakfast  polyethylene glycol 3350 17 Gram(s) Oral every 12 hours  predniSONE   Tablet 10 milliGRAM(s) Oral daily  Remodulin (Treprostinil) 5mG/mL 1 Vial(s) 1 Vial(s) SubCutaneous Continuous Pump  senna 2 Tablet(s) Oral at bedtime  sildenafil (REVATIO) 20 milliGRAM(s) Oral <User Schedule>  sodium chloride 0.65% Nasal 1 Spray(s) Both Nostrils every 6 hours PRN  sodium chloride 0.9% lock flush 10 milliLiter(s) IV Push every 1 hour PRN  spironolactone 25 milliGRAM(s) Oral daily      Vitals:  T(C): 36.7 (22 @ 08:00), Max: 36.7 (22 @ 09:00)  HR: 102 (22 @ 08:00) (96 - 113)  BP: --  BP(mean): --  RR: 27 (22 @ 08:00) (15 - 34)  SpO2: 95% (22 @ 08:00) (91% - 100%)    Daily     Daily Weight in k (06 Sep 2022 04:00)    Weight (kg): 104.3 ( @ 20:00)    I&O's Summary    05 Sep 2022 07:01  -  06 Sep 2022 07:00  --------------------------------------------------------  IN: 1705.2 mL / OUT: 1530 mL / NET: 175.2 mL    06 Sep 2022 07:01  -  06 Sep 2022 08:10  --------------------------------------------------------  IN: 49.8 mL / OUT: 0 mL / NET: 49.8 mL        Physical Exam:  Appearance: No Acute Distress  HEENT: PERRL  Neck: No JVD  Gastrointestinal: Soft, Non-tender	  Skin: No cyanosis	  Neurologic: Non-focal  Extremities: No LE edema  Psychiatry: A & O x 3, Mood & affect appropriate    Labs:                        11.0   15.45 )-----------( 238      ( 06 Sep 2022 00:10 )             35.1     09-06    133<L>  |  97  |  12  ----------------------------<  92  4.1   |  28  |  0.82    Ca    8.6      06 Sep 2022 00:10  Phos  4.4     09-  Mg     2.0     -06    TPro  6.8  /  Alb  3.6  /  TBili  0.7  /  DBili  x   /  AST  29  /  ALT  127<H>  /  AlkPhos  55  09-06    PT/INR - ( 06 Sep 2022 00:10 )   PT: 12.2 sec;   INR: 1.05 ratio         PTT - ( 06 Sep 2022 00:10 )  PTT:81.9 sec

## 2022-09-06 NOTE — PROGRESS NOTE ADULT - ASSESSMENT
42 yr old female with stated hx significant for PulmHTN class I/VI on Treprostinil therapy, Chronic PE who originally presented with palpitations accompanied by orthopnea/N/V/RUQ abd pain over a 2 day period. Course c/b hypotension secondary to acute on chronic cor pulmonale with end organ dysfx (EDOUARD, transaminitis).    Plan:    #Neuro:  =no active issues  -Neuro checks   -activity as tolerated  -physical therapy       #Pulm:  =Asthma, Cor pulmonale, PulmHTN (class I/VI)  -Supplemental O2 prn to maintain SPO2 > 92% (currently 2 liters N/C); able to wean off to RA ; o2sat >94%   -Ipratropium q 6 hrs and prn for sob/wheezes  -HOB >/= 30 degree angle  -pHTN specialist Dr. Yoon on case; input appreciated   -Marcela titrated off 9/2   -Sildenafil increased to 20 mg TID (9/5) and keep Dobutamine  gtt @ 2.5 . before d/c Dobutamine will send pre and post venous sats ;   -continue with subq Treprostinil therapy with eventual plan for IV FLOLAN ; eval 9/6    -c/w aldactone 25 mg PO q d; goal net 500 ml negative  (- 650 ml )   -consider obtaining I.R. consult for tunnel catheter placement  -c/w home dose  prednisone 10 mg PO q d        #CV:  =resolving acute on chronic cor pulmonale due to pulmHTN, echogenic mass in RA concerning for clot in transit (found to be organized old clot)  -dobutamine gtt - currently on 2.5 mcg/kg/min ; ( SBP . HR )    -c/w aldactone home dose, goal  net negative   -TTE 8/31/22 - hyperdynamic LVSF, new echogenic mass in RA, RVE concerning for clot in transit, severe PulmHTN  -CTA chest 8/31/22  re-demonstrated severe dilatation of Pulm arteries, without new P.E.  -Duplex bilat L.E. 9/1/22 - No DVT  -s/p catheter directed thrombus aspiration 8/31/22 (see vascular cardiology note); with partial cloth removal c/b pseudoaneurysm; s/p thrombin injection 9/3 ; repeat duplex L groin 9/4 pending results   -vascular  cards input appreciated    -Treprostinil therapy as above  -sildenafil as above  -home med of rivaroxaban 10 mg po qd - currently on hold   -Heparin gtt resumed 9/3 -->   -bleeding precaution     #GI/:  =resolving RUQ pain, improving transaminitis (most likely due to congestive hepatopathy), resolving EDOUARD (most likely pre-renal)  -received RUQ U.S. 8/30/22 - CBD 7 mm, mild hepatic steatosis  -c/w bowel regimen   -strict I & O's - goal keep 500 ml  negative  -c/w aldactone home dose  and lasix therapy prn   -daily standing weight     #ID:  =resolving sepsis  -Sputum Cx 8/31/22 - mod PMN, rare squamous epi cells, mod gm+ cocci in pairs, few gm- rods  -Blood Culture 8/31/22 - NGTD  -Blood Culture 8/29/22 - NGTD  -MRSA/MSSA 8/29/22 - Detected  -Blood Cultrues 8/29/22 - Staph Lugdunensis  -RVP 8/28/22 - ND  -SARS-CoV-2 8/28/22 - ND   -continue Mupirocin 2% therapy q 12 hrs x5 days (started 8/30/22)  -s/p aztreonam therapy - (8/29/22-9/1/22)  -trend WBC  -trend T curve     #FEN/ENDO/HEME:  =improving transaminitis, resolving elevate INR  -obtain CMP/Mg++/PO--4/CBC w diff/PT/PTT/INR  q 12 hrs   -trend LFT's; improving   -heparin gtt in progress ; PTT goal 60-99   -bleeding precaution   -CBC /PTT q 6 and PRN     PPX  GI ppx ; PPI    DVT ppx ; heparin gtt     LINE  -s/p R IJTLC 9/2   -L radial A line 9/2        42 yr old female with stated hx significant for PulmHTN class I/VI on Treprostinil therapy, Chronic PE who originally presented with palpitations accompanied by orthopnea/N/V/RUQ abd pain over a 2 day period. Course c/b hypotension secondary to acute on chronic cor pulmonale with end organ dysfx (EDOUARD, transaminitis).    Plan:    #Neuro:  =no active issues  -Neuro checks   -activity as tolerated  -physical therapy       #Pulm:  =Asthma, Cor pulmonale, PulmHTN (class I/VI)  -Supplemental O2 prn to maintain SPO2 > 92% (currently 2 liters N/C); able to wean off to RA ; o2sat >94%   -Ipratropium q 6 hrs and prn for sob/wheezes  -HOB >/= 30 degree angle  -pHTN specialist Dr. Yoon on case; input appreciated   -Marcela titrated off 9/2   -Sildenafil increased to 20 mg TID (9/5)   -d/c Dobutamine @ 9_40 am 9/6; Vsat pre dub 75% ; and venous sat post dub   -continue with subq Treprostinil therapy with eventual plan for IV FLOLAN ; eval 9/6    -c/w aldactone 25 mg PO q d; goal net 500 ml negative   -consider obtaining I.R. consult for tunnel catheter placement  -c/w home dose  prednisone 10 mg PO q d        #CV:  =resolving acute on chronic cor pulmonale due to pulmHTN, echogenic mass in RA concerning for clot in transit (found to be organized old clot)  -dobutamine gtt - currently on 2.5 mcg/kg/min ; ( SBP . HR )    -c/w aldactone home dose, goal  net negative   -TTE 8/31/22 - hyperdynamic LVSF, new echogenic mass in RA, RVE concerning for clot in transit, severe PulmHTN  -CTA chest 8/31/22  re-demonstrated severe dilatation of Pulm arteries, without new P.E.  -Duplex bilat L.E. 9/1/22 - No DVT  -s/p catheter directed thrombus aspiration 8/31/22 (see vascular cardiology note); with partial cloth removal c/b pseudoaneurysm; s/p thrombin injection 9/3 ; repeat duplex L groin 9/4 pending results   -vascular  cards input appreciated    -Treprostinil therapy as above  -sildenafil as above  -home med of rivaroxaban 10 mg po qd - currently on hold   -Heparin gtt resumed 9/3 -->   -bleeding precaution     #GI/:  =resolving RUQ pain, improving transaminitis (most likely due to congestive hepatopathy), resolving EDOUARD (most likely pre-renal)  -received RUQ U.S. 8/30/22 - CBD 7 mm, mild hepatic steatosis  -c/w bowel regimen   -strict I & O's - goal keep 500 ml  negative  -c/w aldactone home dose  and lasix therapy prn   -daily standing weight     #ID:  =resolving sepsis  -Sputum Cx 8/31/22 - mod PMN, rare squamous epi cells, mod gm+ cocci in pairs, few gm- rods  -Blood Culture 8/31/22 - NGTD  -Blood Culture 8/29/22 - NGTD  -MRSA/MSSA 8/29/22 - Detected  -Blood Cultrues 8/29/22 - Staph Lugdunensis  -RVP 8/28/22 - ND  -SARS-CoV-2 8/28/22 - ND   -continue Mupirocin 2% therapy q 12 hrs x5 days (started 8/30/22)  -s/p aztreonam therapy - (8/29/22-9/1/22)  -trend WBC  -trend T curve     #FEN/ENDO/HEME:  =improving transaminitis, resolving elevate INR  -obtain CMP/Mg++/PO--4/CBC w diff/PT/PTT/INR  q 12 hrs   -trend LFT's; improving   -heparin gtt in progress ; PTT goal 60-99   -bleeding precaution   -CBC /PTT q 6 and PRN     PPX  GI ppx ; PPI    DVT ppx ; heparin gtt     LINE  -s/p R IJTLC 9/2   -L radial A line 9/2        42 yr old female with stated hx significant for PulmHTN class I/VI on Treprostinil therapy, Chronic PE who originally presented with palpitations accompanied by orthopnea/N/V/RUQ abd pain over a 2 day period. Course c/b hypotension secondary to acute on chronic cor pulmonale with end organ dysfx (EDOUARD, transaminitis).    Plan:    #Neuro:  =no active issues  -Neuro checks   -activity as tolerated  -physical therapy       #Pulm:  =Asthma, Cor pulmonale, PulmHTN (class I/VI)  -Supplemental O2 prn to maintain SPO2 > 92% (currently 2 liters N/C); able to wean off to RA ; o2sat >94%   -Ipratropium q 6 hrs and prn for sob/wheezes  -HOB >/= 30 degree angle  -pHTN specialist Dr. Yoon on case; input appreciated   -Marcela titrated off 9/2   -Sildenafil increased to 20 mg TID (9/5)   -d/c Dobutamine @ 9_40 am 9/6; Vsat pre dub 75% ; and venous sat post dub 77%.   -continue with subq Treprostinil therapy with eventual plan for IV FLOLAN ; eval 9/6    -c/w aldactone 25 mg PO q d; goal net 500 ml negative   -consider obtaining I.R. consult for tunnel catheter placement  -c/w home dose  prednisone 10 mg PO q d        #CV:  =resolving acute on chronic cor pulmonale due to pulmHTN, echogenic mass in RA concerning for clot in transit (found to be organized old clot)  -dobutamine gtt - currently on 2.5 mcg/kg/min ; ( SBP . HR )    -c/w aldactone home dose, goal  net negative   -TTE 8/31/22 - hyperdynamic LVSF, new echogenic mass in RA, RVE concerning for clot in transit, severe PulmHTN  -CTA chest 8/31/22  re-demonstrated severe dilatation of Pulm arteries, without new P.E.  -Duplex bilat L.E. 9/1/22 - No DVT  -s/p catheter directed thrombus aspiration 8/31/22 (see vascular cardiology note); with partial cloth removal c/b pseudoaneurysm; s/p thrombin injection 9/3 ; repeat duplex L groin 9/4 pending results   -vascular  cards input appreciated    -Treprostinil therapy as above  -sildenafil as above  -home med of rivaroxaban 10 mg po qd - currently on hold   -Heparin gtt resumed 9/3 -->   -bleeding precaution     #GI/:  =resolving RUQ pain, improving transaminitis (most likely due to congestive hepatopathy), resolving EDOUARD (most likely pre-renal)  -received RUQ U.S. 8/30/22 - CBD 7 mm, mild hepatic steatosis  -c/w bowel regimen   -strict I & O's - goal keep 500 ml  negative  -c/w aldactone home dose  and lasix therapy prn   -daily standing weight     #ID:  =resolving sepsis  -Sputum Cx 8/31/22 - mod PMN, rare squamous epi cells, mod gm+ cocci in pairs, few gm- rods  -Blood Culture 8/31/22 - NGTD  -Blood Culture 8/29/22 - NGTD  -MRSA/MSSA 8/29/22 - Detected  -Blood Cultrues 8/29/22 - Staph Lugdunensis  -RVP 8/28/22 - ND  -SARS-CoV-2 8/28/22 - ND   -continue Mupirocin 2% therapy q 12 hrs x5 days (started 8/30/22)  -s/p aztreonam therapy - (8/29/22-9/1/22)  -trend WBC  -trend T curve     #FEN/ENDO/HEME:  =improving transaminitis, resolving elevate INR  -obtain CMP/Mg++/PO--4/CBC w diff/PT/PTT/INR  q 12 hrs   -trend LFT's; improving   -heparin gtt in progress ; PTT goal 60-99   -bleeding precaution   -CBC /PTT q 6 and PRN     PPX  GI ppx ; PPI    DVT ppx ; heparin gtt     LINE  -s/p R IJTLC 9/2   -L radial A line 9/2        42 yr old female with stated hx significant for PulmHTN class I/VI on Treprostinil therapy, Chronic PE who originally presented with palpitations accompanied by orthopnea/N/V/RUQ abd pain over a 2 day period. Course c/b hypotension secondary to acute on chronic cor pulmonale with end organ dysfx (EDOUARD, transaminitis).    Plan:    #Neuro:  =no active issues  -Neuro checks   -activity as tolerated  -physical therapy       #Pulm:  =Asthma, Cor pulmonale, PulmHTN (class I/VI)  -Supplemental O2 prn to maintain SPO2 > 92% (currently 2 liters N/C); able to wean off to RA ; o2sat >94%   -Ipratropium q 6 hrs and prn for sob/wheezes  -HOB >/= 30 degree angle  -pHTN specialist Dr. Yoon on case; input appreciated   -Marcela titrated off 9/2   -Sildenafil increased to 20 mg TID (9/5)   -d/c Dobutamine @ 9_40 am 9/6; Vsat pre dub 75% ; and venous sat post dub 77%.   -continue with subq Treprostinil therapy with eventual plan for IV FLOLAN ; eval 9/6    -c/w aldactone 25 mg PO q d; goal net 500 ml negative   -consider obtaining I.R. consult for tunnel catheter placement  -c/w home dose  prednisone 10 mg PO q d        #CV:  =resolving acute on chronic cor pulmonale due to pulmHTN, echogenic mass in RA concerning for clot in transit (found to be organized old clot)  -dobutamine gtt d/c 9/6   -c/w aldactone home dose, goal  net negative   -TTE 8/31/22 - hyperdynamic LVSF, new echogenic mass in RA, RVE concerning for clot in transit, severe PulmHTN  -CTA chest 8/31/22  re-demonstrated severe dilatation of Pulm arteries, without new P.E.  -Duplex bilat L.E. 9/1/22 - No DVT  -s/p catheter directed thrombus aspiration 8/31/22 (see vascular cardiology note); with partial cloth removal c/b pseudoaneurysm; s/p thrombin injection 9/3 ; repeat duplex L groin 9/4 resulted today with report " nibble communication between L F artery and pseudo. Vasc called. repeat US duplex of L F pending ; call placed to US ([phone]) dep.   -vascular  cards input appreciated    -Treprostinil therapy as above  -sildenafil as above  -home med of rivaroxaban 10 mg po qd - currently on hold   -Heparin gtt resumed 9/3 -->   -bleeding precaution     #GI/:  =resolving RUQ pain, improving transaminitis (most likely due to congestive hepatopathy), resolving EDOUARD (most likely pre-renal)  -received RUQ U.S. 8/30/22 - CBD 7 mm, mild hepatic steatosis  -c/w bowel regimen ; last BM 9/5   -strict I & O's - goal keep 500 ml  negative  -c/w aldactone home dose  and lasix therapy prn   -daily standing weight     #ID:  =resolving sepsis  -Sputum Cx 8/31/22 - mod PMN, rare squamous epi cells, mod gm+ cocci in pairs, few gm- rods  -Blood Culture 8/31/22 - NGTD  -Blood Culture 8/29/22 - NGTD  -MRSA/MSSA 8/29/22 - Detected  -Blood Cultrues 8/29/22 - Staph Lugdunensis  -RVP 8/28/22 - ND  -SARS-CoV-2 8/28/22 - ND   -continue Mupirocin 2% therapy q 12 hrs x5 days (started 8/30/22)  -s/p aztreonam therapy - (8/29/22-9/1/22)  -trend WBC  -trend T curve     #FEN/ENDO/HEME:  =improving transaminitis, resolving elevate INR  -obtain CMP/Mg++/PO--4/CBC w diff/PT/PTT/INR  q 12 hrs   -trend LFT's; improving   -heparin gtt in progress ; PTT goal 60-99   -bleeding precaution   -CBC /PTT q 6 and PRN     PPX  GI ppx ; PPI    DVT ppx ; heparin gtt     LINE  -s/p R IJTLC 9/2   -L radial A line 9/2

## 2022-09-06 NOTE — PROGRESS NOTE ADULT - ASSESSMENT
----- Message from Bailey Nolan sent at 9/6/2022 10:47 AM CDT -----  Type: RX Refill Request    Who Called: pt     Have you contacted your pharmacy:    Refill or New Rx:hydroCHLOROthiazide (HYDRODIURIL) 25 MG tablet - needs pa    RX Name and Strength:    How is the patient currently taking it? (ex. 1XDay):    Is this a 30 day or 90 day RX:    Preferred Pharmacy with phone number:  [facility]/pharmacy #[id number] - [place] - [address] [facility]  [address] [place] [address]  Phone: [phone] Fax: [phone]      Local or Mail Order:    Ordering Provider:    Would the patient rather a call back or a response via My [facility]? call    Best Call Back Number:[phone] (home) [phone] (work)      Additional Information:          36 F with pmhx PE on coumadin, pulmonary HTN, anemia referred by her Pulmonologist for tachycardia.

## 2022-09-06 NOTE — PROGRESS NOTE ADULT - PROBLEM SELECTOR PLAN 1
- Wean dobutamine   - K>4, Mg >2  - Monitor I/O, daily weights   - diuretics held, euvolemic    - c/w spironolactone  - Daily standing weights.

## 2022-09-06 NOTE — PROGRESS NOTE ADULT - ATTENDING COMMENTS
wean dobutamine to off, monitor central venous sat  appears euvolemic  transduce CVP from CVC cath  tolerating sildenafil + SQ remodulin  Pulm HTN team to follow  Lung txp team consult   Rt Femoral vein Pseudoaneurysm, s/p Vascular intervention  rept vascular dupplex w/o PSA

## 2022-09-06 NOTE — PROGRESS NOTE ADULT - SUBJECTIVE AND OBJECTIVE BOX
Vascular Surgery Progress Note  Patient is a 42y old  Female who presents with a chief complaint of Palpitations (06 Sep 2022 08:10)      INTERVAL EVENTS: Re-called for increasing pain and heaviness in LLE  SUBJECTIVE: Patient seen and examined at bedside with surgical team. Having pain at L groin site, more difficulty ambulating today. States too painful to bear weight on LLE.       OBJECTIVE:    Vital Signs Last 24 Hrs  T(C): 37.6 (06 Sep 2022 16:00), Max: 37.6 (06 Sep 2022 16:00)  T(F): 99.6 (06 Sep 2022 16:00), Max: 99.6 (06 Sep 2022 16:00)  HR: 95 (06 Sep 2022 16:00) (90 - 113)  BP: --  BP(mean): --  RR: 32 (06 Sep 2022 16:00) (15 - 32)  SpO2: 97% (06 Sep 2022 16:00) (91% - 100%)    Parameters below as of 06 Sep 2022 08:00  Patient On (Oxygen Delivery Method): room air    I&O's Detail    05 Sep 2022 07:01  -  06 Sep 2022 07:00  --------------------------------------------------------  IN:    DOBUTamine: 187.2 mL    Heparin: 288 mL    Oral Fluid: 1230 mL  Total IN: 1705.2 mL    OUT:    Voided (mL): 1530 mL  Total OUT: 1530 mL    Total NET: 175.2 mL      06 Sep 2022 07:01  -  06 Sep 2022 16:18  --------------------------------------------------------  IN:    DOBUTamine: 15.6 mL    Heparin: 108 mL    Oral Fluid: 110 mL  Total IN: 233.6 mL    OUT:    Voided (mL): 410 mL  Total OUT: 410 mL    Total NET: -176.4 mL      MEDICATIONS  (STANDING):  chlorhexidine 4% Liquid 1 Application(s) Topical <User Schedule>  cyanocobalamin 1000 MICROGram(s) Oral daily  DOBUTamine Infusion 2.5 MICROgram(s)/kG/Min (7.82 mL/Hr) IV Continuous <Continuous>  heparin  Infusion 1200 Unit(s)/Hr (12 mL/Hr) IV Continuous <Continuous>  pantoprazole    Tablet 40 milliGRAM(s) Oral before breakfast  polyethylene glycol 3350 17 Gram(s) Oral every 12 hours  predniSONE   Tablet 10 milliGRAM(s) Oral daily  Remodulin (Treprostinil) 5mG/mL 1 Vial(s) 1 Vial(s) SubCutaneous Continuous Pump  senna 2 Tablet(s) Oral at bedtime  sildenafil (REVATIO) 20 milliGRAM(s) Oral <User Schedule>  spironolactone 25 milliGRAM(s) Oral daily    MEDICATIONS  (PRN):  acetaminophen     Tablet .. 650 milliGRAM(s) Oral every 6 hours PRN Mild Pain (1 - 3)  aluminum hydroxide/magnesium hydroxide/simethicone Suspension 30 milliLiter(s) Oral every 4 hours PRN Dyspepsia  artificial  tears Solution 1 Drop(s) Both EYES three times a day PRN Dry Eyes  HYDROmorphone  Injectable 0.5 milliGRAM(s) IV Push every 4 hours PRN Pain  ipratropium    for Nebulization 500 MICROGram(s) Nebulizer every 6 hours PRN Bronchospasm  melatonin 3 milliGRAM(s) Oral at bedtime PRN Insomnia  ondansetron Injectable 4 milliGRAM(s) IV Push every 8 hours PRN Nausea and/or Vomiting  sodium chloride 0.65% Nasal 1 Spray(s) Both Nostrils every 6 hours PRN Nasal Congestion  sodium chloride 0.9% lock flush 10 milliLiter(s) IV Push every 1 hour PRN Pre/post blood products, medications, blood draw, and to maintain line patency      PHYSICAL EXAM:  Constitutional: A&Ox3, NAD  Respiratory: Unlabored breathing  Abdomen: Soft, nondistended, NTTP. No rebound or guarding.  Extremities: Left palpable dp/pt, tenderness surrounding the PCI site, but no erythema, fluctuance. LLE motor and sensation intact    LABS:                        11.0   15.45 )-----------( 238      ( 06 Sep 2022 00:10 )             35.1     09-06    133<L>  |  97  |  12  ----------------------------<  92  4.1   |  28  |  0.82    Ca    8.6      06 Sep 2022 00:10  Phos  4.4     09-06  Mg     2.0     09-06    TPro  6.8  /  Alb  3.6  /  TBili  0.7  /  DBili  x   /  AST  29  /  ALT  127<H>  /  AlkPhos  55  09-06    PT/INR - ( 06 Sep 2022 00:10 )   PT: 12.2 sec;   INR: 1.05 ratio         PTT - ( 06 Sep 2022 00:10 )  PTT:81.9 sec  LIVER FUNCTIONS - ( 06 Sep 2022 00:10 )  Alb: 3.6 g/dL / Pro: 6.8 g/dL / ALK PHOS: 55 U/L / ALT: 127 U/L / AST: 29 U/L / GGT: x

## 2022-09-06 NOTE — PROGRESS NOTE ADULT - ASSESSMENT
42F PMH PE on xarelto, pulmonary HTN (likely group I and group IV), ILD, JULIA, obesity presents to the hospital for 2 days of palpitations and abdominal pain/nausea/emesis. Denies any inciting event. Noted to have labile hypotension and labs notable for worsening transaminitis, BNP 9k (much higher than prior) as well as acute kidney injury. Feels improved since initiation of /Marcela. Found to have RA clot on repeat TTE and underwent CTA which did not reveal any PE but went for aspiration and had partial thrombus removal and stopped as appeared more chronic. Changed to heparin gtt. Course c/b pseudoaneurysm and underwent thrombin injection 9/3. Patient taken off Marcela over past weekend.   Improving LFTs and renal function normalized. Patient continued Remodulin with plan with possible transition to IV Remodulin       TTE done which showed hyperdynamic and compressed LV, severe RV dysfunction, mod TR, dilated IVC. Repeat TTE with improved RV function however RA thrombus noted. Overall stage D, NYHA class IV with severe pulmonary HTN.

## 2022-09-07 DIAGNOSIS — R78.81 BACTEREMIA: ICD-10-CM

## 2022-09-07 DIAGNOSIS — R79.89 OTHER SPECIFIED ABNORMAL FINDINGS OF BLOOD CHEMISTRY: ICD-10-CM

## 2022-09-07 LAB
ALBUMIN SERPL ELPH-MCNC: 3.7 G/DL — SIGNIFICANT CHANGE UP (ref 3.3–5)
ALP SERPL-CCNC: 58 U/L — SIGNIFICANT CHANGE UP (ref 40–120)
ALT FLD-CCNC: 103 U/L — HIGH (ref 10–45)
ANION GAP SERPL CALC-SCNC: 11 MMOL/L — SIGNIFICANT CHANGE UP (ref 5–17)
APTT BLD: 31.8 SEC — SIGNIFICANT CHANGE UP (ref 27.5–35.5)
APTT BLD: 33 SEC — SIGNIFICANT CHANGE UP (ref 27.5–35.5)
APTT BLD: 58.9 SEC — HIGH (ref 27.5–35.5)
AST SERPL-CCNC: 27 U/L — SIGNIFICANT CHANGE UP (ref 10–40)
BASE EXCESS BLDV CALC-SCNC: 3.1 MMOL/L — HIGH (ref -2–3)
BASE EXCESS BLDV CALC-SCNC: 4.9 MMOL/L — HIGH (ref -2–3)
BASOPHILS # BLD AUTO: 0.08 K/UL — SIGNIFICANT CHANGE UP (ref 0–0.2)
BASOPHILS NFR BLD AUTO: 0.5 % — SIGNIFICANT CHANGE UP (ref 0–2)
BILIRUB SERPL-MCNC: 0.8 MG/DL — SIGNIFICANT CHANGE UP (ref 0.2–1.2)
BUN SERPL-MCNC: 9 MG/DL — SIGNIFICANT CHANGE UP (ref 7–23)
CA-I SERPL-SCNC: 1.19 MMOL/L — SIGNIFICANT CHANGE UP (ref 1.15–1.33)
CA-I SERPL-SCNC: 1.19 MMOL/L — SIGNIFICANT CHANGE UP (ref 1.15–1.33)
CALCIUM SERPL-MCNC: 9 MG/DL — SIGNIFICANT CHANGE UP (ref 8.4–10.5)
CHLORIDE BLDV-SCNC: 98 MMOL/L — SIGNIFICANT CHANGE UP (ref 96–108)
CHLORIDE BLDV-SCNC: 98 MMOL/L — SIGNIFICANT CHANGE UP (ref 96–108)
CHLORIDE SERPL-SCNC: 98 MMOL/L — SIGNIFICANT CHANGE UP (ref 96–108)
CO2 BLDV-SCNC: 28 MMOL/L — HIGH (ref 22–26)
CO2 BLDV-SCNC: 31 MMOL/L — HIGH (ref 22–26)
CO2 SERPL-SCNC: 24 MMOL/L — SIGNIFICANT CHANGE UP (ref 22–31)
CREAT SERPL-MCNC: 0.76 MG/DL — SIGNIFICANT CHANGE UP (ref 0.5–1.3)
EGFR: 100 ML/MIN/1.73M2 — SIGNIFICANT CHANGE UP
EOSINOPHIL # BLD AUTO: 0.33 K/UL — SIGNIFICANT CHANGE UP (ref 0–0.5)
EOSINOPHIL NFR BLD AUTO: 2.1 % — SIGNIFICANT CHANGE UP (ref 0–6)
GAS PNL BLDV: 131 MMOL/L — LOW (ref 136–145)
GAS PNL BLDV: 131 MMOL/L — LOW (ref 136–145)
GAS PNL BLDV: SIGNIFICANT CHANGE UP
GAS PNL BLDV: SIGNIFICANT CHANGE UP
GLUCOSE BLDV-MCNC: 92 MG/DL — SIGNIFICANT CHANGE UP (ref 70–99)
GLUCOSE BLDV-MCNC: 97 MG/DL — SIGNIFICANT CHANGE UP (ref 70–99)
GLUCOSE SERPL-MCNC: 100 MG/DL — HIGH (ref 70–99)
HCG UR QL: NEGATIVE — SIGNIFICANT CHANGE UP
HCO3 BLDV-SCNC: 27 MMOL/L — SIGNIFICANT CHANGE UP (ref 22–29)
HCO3 BLDV-SCNC: 30 MMOL/L — HIGH (ref 22–29)
HCT VFR BLD CALC: 36.8 % — SIGNIFICANT CHANGE UP (ref 34.5–45)
HCT VFR BLD CALC: 37.2 % — SIGNIFICANT CHANGE UP (ref 34.5–45)
HCT VFR BLD CALC: 40.6 % — SIGNIFICANT CHANGE UP (ref 34.5–45)
HCT VFR BLDA CALC: 36 % — SIGNIFICANT CHANGE UP (ref 34.5–46.5)
HCT VFR BLDA CALC: 36 % — SIGNIFICANT CHANGE UP (ref 34.5–46.5)
HGB BLD CALC-MCNC: 12 G/DL — SIGNIFICANT CHANGE UP (ref 11.7–16.1)
HGB BLD CALC-MCNC: 12.1 G/DL — SIGNIFICANT CHANGE UP (ref 11.7–16.1)
HGB BLD-MCNC: 11.4 G/DL — LOW (ref 11.5–15.5)
HGB BLD-MCNC: 11.6 G/DL — SIGNIFICANT CHANGE UP (ref 11.5–15.5)
HGB BLD-MCNC: 12.7 G/DL — SIGNIFICANT CHANGE UP (ref 11.5–15.5)
IMM GRANULOCYTES NFR BLD AUTO: 0.6 % — SIGNIFICANT CHANGE UP (ref 0–1.5)
INR BLD: 1.08 RATIO — SIGNIFICANT CHANGE UP (ref 0.88–1.16)
LACTATE BLDV-MCNC: 0.7 MMOL/L — SIGNIFICANT CHANGE UP (ref 0.5–2)
LACTATE BLDV-MCNC: 1 MMOL/L — SIGNIFICANT CHANGE UP (ref 0.5–2)
LYMPHOCYTES # BLD AUTO: 14.6 % — SIGNIFICANT CHANGE UP (ref 13–44)
LYMPHOCYTES # BLD AUTO: 2.35 K/UL — SIGNIFICANT CHANGE UP (ref 1–3.3)
MAGNESIUM SERPL-MCNC: 1.9 MG/DL — SIGNIFICANT CHANGE UP (ref 1.6–2.6)
MCHC RBC-ENTMCNC: 22.5 PG — LOW (ref 27–34)
MCHC RBC-ENTMCNC: 22.5 PG — LOW (ref 27–34)
MCHC RBC-ENTMCNC: 22.8 PG — LOW (ref 27–34)
MCHC RBC-ENTMCNC: 31 GM/DL — LOW (ref 32–36)
MCHC RBC-ENTMCNC: 31.2 GM/DL — LOW (ref 32–36)
MCHC RBC-ENTMCNC: 31.3 GM/DL — LOW (ref 32–36)
MCV RBC AUTO: 72.1 FL — LOW (ref 80–100)
MCV RBC AUTO: 72.7 FL — LOW (ref 80–100)
MCV RBC AUTO: 72.8 FL — LOW (ref 80–100)
MONOCYTES # BLD AUTO: 1.28 K/UL — HIGH (ref 0–0.9)
MONOCYTES NFR BLD AUTO: 8 % — SIGNIFICANT CHANGE UP (ref 2–14)
NEUTROPHILS # BLD AUTO: 11.92 K/UL — HIGH (ref 1.8–7.4)
NEUTROPHILS NFR BLD AUTO: 74.2 % — SIGNIFICANT CHANGE UP (ref 43–77)
NRBC # BLD: 0 /100 WBCS — SIGNIFICANT CHANGE UP (ref 0–0)
PCO2 BLDV: 38 MMHG — LOW (ref 39–42)
PCO2 BLDV: 45 MMHG — HIGH (ref 39–42)
PH BLDV: 7.43 — SIGNIFICANT CHANGE UP (ref 7.32–7.43)
PH BLDV: 7.46 — HIGH (ref 7.32–7.43)
PHOSPHATE SERPL-MCNC: 3.6 MG/DL — SIGNIFICANT CHANGE UP (ref 2.5–4.5)
PLATELET # BLD AUTO: 256 K/UL — SIGNIFICANT CHANGE UP (ref 150–400)
PLATELET # BLD AUTO: 261 K/UL — SIGNIFICANT CHANGE UP (ref 150–400)
PLATELET # BLD AUTO: 284 K/UL — SIGNIFICANT CHANGE UP (ref 150–400)
PO2 BLDV: 34 MMHG — SIGNIFICANT CHANGE UP (ref 25–45)
PO2 BLDV: 41 MMHG — SIGNIFICANT CHANGE UP (ref 25–45)
POTASSIUM BLDV-SCNC: 4 MMOL/L — SIGNIFICANT CHANGE UP (ref 3.5–5.1)
POTASSIUM BLDV-SCNC: 4.4 MMOL/L — SIGNIFICANT CHANGE UP (ref 3.5–5.1)
POTASSIUM SERPL-MCNC: 4.2 MMOL/L — SIGNIFICANT CHANGE UP (ref 3.5–5.3)
POTASSIUM SERPL-SCNC: 4.2 MMOL/L — SIGNIFICANT CHANGE UP (ref 3.5–5.3)
PROT SERPL-MCNC: 7 G/DL — SIGNIFICANT CHANGE UP (ref 6–8.3)
PROTHROM AB SERPL-ACNC: 12.4 SEC — SIGNIFICANT CHANGE UP (ref 10.5–13.4)
RBC # BLD: 5.06 M/UL — SIGNIFICANT CHANGE UP (ref 3.8–5.2)
RBC # BLD: 5.16 M/UL — SIGNIFICANT CHANGE UP (ref 3.8–5.2)
RBC # BLD: 5.58 M/UL — HIGH (ref 3.8–5.2)
RBC # FLD: 17.7 % — HIGH (ref 10.3–14.5)
RBC # FLD: 17.8 % — HIGH (ref 10.3–14.5)
RBC # FLD: 18.5 % — HIGH (ref 10.3–14.5)
SAO2 % BLDV: 57.6 % — LOW (ref 67–88)
SAO2 % BLDV: 72.6 % — SIGNIFICANT CHANGE UP (ref 67–88)
SODIUM SERPL-SCNC: 133 MMOL/L — LOW (ref 135–145)
WBC # BLD: 15.97 K/UL — HIGH (ref 3.8–10.5)
WBC # BLD: 15.98 K/UL — HIGH (ref 3.8–10.5)
WBC # BLD: 16.06 K/UL — HIGH (ref 3.8–10.5)
WBC # FLD AUTO: 15.97 K/UL — HIGH (ref 3.8–10.5)
WBC # FLD AUTO: 15.98 K/UL — HIGH (ref 3.8–10.5)
WBC # FLD AUTO: 16.06 K/UL — HIGH (ref 3.8–10.5)

## 2022-09-07 PROCEDURE — 99221 1ST HOSP IP/OBS SF/LOW 40: CPT

## 2022-09-07 PROCEDURE — 99222 1ST HOSP IP/OBS MODERATE 55: CPT

## 2022-09-07 PROCEDURE — 99233 SBSQ HOSP IP/OBS HIGH 50: CPT | Mod: GC

## 2022-09-07 RX ORDER — HEPARIN SODIUM 5000 [USP'U]/ML
1250 INJECTION INTRAVENOUS; SUBCUTANEOUS
Qty: 25000 | Refills: 0 | Status: DISCONTINUED | OUTPATIENT
Start: 2022-09-07 | End: 2022-09-07

## 2022-09-07 RX ORDER — CEFAZOLIN SODIUM 1 G
2000 VIAL (EA) INJECTION EVERY 8 HOURS
Refills: 0 | Status: DISCONTINUED | OUTPATIENT
Start: 2022-09-08 | End: 2022-09-13

## 2022-09-07 RX ORDER — CEFAZOLIN SODIUM 1 G
VIAL (EA) INJECTION
Refills: 0 | Status: DISCONTINUED | OUTPATIENT
Start: 2022-09-07 | End: 2022-09-13

## 2022-09-07 RX ORDER — HEPARIN SODIUM 5000 [USP'U]/ML
1500 INJECTION INTRAVENOUS; SUBCUTANEOUS
Qty: 25000 | Refills: 0 | Status: DISCONTINUED | OUTPATIENT
Start: 2022-09-07 | End: 2022-09-08

## 2022-09-07 RX ORDER — CEFAZOLIN SODIUM 1 G
2000 VIAL (EA) INJECTION ONCE
Refills: 0 | Status: COMPLETED | OUTPATIENT
Start: 2022-09-07 | End: 2022-09-07

## 2022-09-07 RX ADMIN — Medication 10 MILLIGRAM(S): at 05:22

## 2022-09-07 RX ADMIN — HYDROMORPHONE HYDROCHLORIDE 0.5 MILLIGRAM(S): 2 INJECTION INTRAMUSCULAR; INTRAVENOUS; SUBCUTANEOUS at 16:24

## 2022-09-07 RX ADMIN — HYDROMORPHONE HYDROCHLORIDE 0.5 MILLIGRAM(S): 2 INJECTION INTRAMUSCULAR; INTRAVENOUS; SUBCUTANEOUS at 20:40

## 2022-09-07 RX ADMIN — PANTOPRAZOLE SODIUM 40 MILLIGRAM(S): 20 TABLET, DELAYED RELEASE ORAL at 06:56

## 2022-09-07 RX ADMIN — Medication 20 MILLIGRAM(S): at 14:26

## 2022-09-07 RX ADMIN — CHLORHEXIDINE GLUCONATE 1 APPLICATION(S): 213 SOLUTION TOPICAL at 05:26

## 2022-09-07 RX ADMIN — PREGABALIN 1000 MICROGRAM(S): 225 CAPSULE ORAL at 12:02

## 2022-09-07 RX ADMIN — HYDROMORPHONE HYDROCHLORIDE 0.5 MILLIGRAM(S): 2 INJECTION INTRAMUSCULAR; INTRAVENOUS; SUBCUTANEOUS at 15:39

## 2022-09-07 RX ADMIN — Medication 650 MILLIGRAM(S): at 00:00

## 2022-09-07 RX ADMIN — HEPARIN SODIUM 12 UNIT(S)/HR: 5000 INJECTION INTRAVENOUS; SUBCUTANEOUS at 05:14

## 2022-09-07 RX ADMIN — HYDROMORPHONE HYDROCHLORIDE 0.5 MILLIGRAM(S): 2 INJECTION INTRAMUSCULAR; INTRAVENOUS; SUBCUTANEOUS at 01:00

## 2022-09-07 RX ADMIN — HYDROMORPHONE HYDROCHLORIDE 0.5 MILLIGRAM(S): 2 INJECTION INTRAMUSCULAR; INTRAVENOUS; SUBCUTANEOUS at 00:31

## 2022-09-07 RX ADMIN — Medication 20 MILLIGRAM(S): at 05:22

## 2022-09-07 RX ADMIN — Medication 100 MILLIGRAM(S): at 18:45

## 2022-09-07 RX ADMIN — HEPARIN SODIUM 18 UNIT(S)/HR: 5000 INJECTION INTRAVENOUS; SUBCUTANEOUS at 20:35

## 2022-09-07 RX ADMIN — HYDROMORPHONE HYDROCHLORIDE 0.5 MILLIGRAM(S): 2 INJECTION INTRAMUSCULAR; INTRAVENOUS; SUBCUTANEOUS at 20:20

## 2022-09-07 RX ADMIN — HEPARIN SODIUM 15 UNIT(S)/HR: 5000 INJECTION INTRAVENOUS; SUBCUTANEOUS at 12:02

## 2022-09-07 RX ADMIN — SPIRONOLACTONE 25 MILLIGRAM(S): 25 TABLET, FILM COATED ORAL at 06:56

## 2022-09-07 NOTE — PROGRESS NOTE ADULT - PROBLEM SELECTOR PLAN 2
- hyperdynamic LVSF, new echogenic mass in RA / clot s/p cath directed thrombus aspiration ib 9/3 c/b L common femoral artery pseudoaneurysm, 9/6 repeat duplex- found +hematoma w/o pseudoaneurysm f/u by vascular sx   - heparin gtt - Staph lugdenesis 8/29 bcx, coagulase-negative staph treated in a similar manner to staph aureus  - Likely source is abscess of the rt subcutaneous port  - F/u BCx (9/7/22)  - MYRIAM planned for 9/8  - Will consult surgery in AM for port removal and drainage of abscess  - Cefazolin 2g q 8 x 4 week duration

## 2022-09-07 NOTE — PROGRESS NOTE ADULT - PROBLEM SELECTOR PLAN 5
DVT Prophylaxis: heparin gtt  Diet: Regular  Dispo: Home with home PT - hematoma of left groin, no evidence of pseudoaneurysm.  - continue to monitor  - Will consider imaging.

## 2022-09-07 NOTE — CONSULT NOTE ADULT - ASSESSMENT
42F PMHx of pulmonary HTN (group I and group IV; on Treprostinil and Xarelto), ILD, JULIA, obesity presents on 8/28 for pain, nausea and vomiting, found to be in profound RV failure s/p dobutamine, Marcela, and aggressive diuresis. Patient also noted to have RA clot in transit on TTE s/p mechanical thrombectomy which was complicated by left groin pseudoaneurysm s/p thrombin injection. Course complicated by EDOUARD likely cardiorenal which has improved.    TTE done which showed hyperdynamic and compressed LV, severe RV dysfunction, mod TR, dilated IVC. Repeat TTE with improved RV function however RA thrombus noted.   CTA:  marked enlargement of PA. No PE. B/L GGOs.     Recommendations        Dr. Ramesh Echevarria,   Pulmonary and Critical Care Medicine Fellow   Available via [employer] Teams - **Preferred**  Pulmonary Spectra #[id number] ([facility]) / [id number] ([facility])  Pager:  [phone]   42F PMHx of pulmonary HTN (group I and group IV; on Treprostinil and Xarelto), ILD, JULIA, obesity, and PPM presents on 8/28 for pain, nausea and vomiting, found to be in profound RV failure s/p dobutamine, Marcela, and aggressive diuresis. Patient also noted to have RA clot in transit on TTE s/p mechanical thrombectomy which was complicated by left groin pseudoaneurysm s/p thrombin injection. Course complicated by EDOUARD likely cardiorenal which has improved.    TTE done which showed hyperdynamic and compressed LV, severe RV dysfunction, mod TR, dilated IVC. Repeat TTE with improved RV function however RA thrombus noted.   CTA:  marked enlargement of PA. No PE. B/L GGOs.     Recommendations  -Of note, patient with positive blood culture on 8/29 growing Staph Lugdunensus which is considered to be a pathologic CoNS. Given persistent leukocytosis with cardiac device and Remodulin pump, would obtain ID opinion.   -CW Sildenafil 20 mg po tid.   -CW Remodulin SQ pump at 42 ng/kg/min  Continuous Pump  -CW chronic prednisone at 10 mg po daily.   -Pt of Dr. Yoon. Will discuss.   -CW ipratropium prn.   -Diuretic mgmt per HF.   -Being evaluated by transplant team.   -Aggressive physical therapy. PT/OT consult. PM&R. Out of bed to chair daily.   -Optimize nutrition. Consult nutrition.  -All teams contribution to care greatly appreciated       Dr. Ramesh Echevarria, DO  Pulmonary and Critical Care Medicine Fellow   Available via Microsoft Teams - **Preferred**  Pulmonary Spectra #[id number] ([facility]) / [id number] ([facility])  Pager:  [phone]   42F PMHx of pulmonary HTN (group I and group IV; on Treprostinil and Xarelto), ILD, JULIA, obesity, and PPM presents on 8/28 for pain, nausea and vomiting, found to be in profound RV failure s/p dobutamine, Marcela, and aggressive diuresis. Patient also noted to have RA clot in transit on TTE s/p mechanical thrombectomy which was complicated by left groin pseudoaneurysm s/p thrombin injection. Course complicated by EDOUARD likely cardiorenal which has improved.    TTE done which showed hyperdynamic and compressed LV, severe RV dysfunction, mod TR, dilated IVC. Repeat TTE with improved RV function however RA thrombus noted.   CTA:  marked enlargement of PA. No PE. B/L GGOs.     Recommendations  -Of note, patient with positive blood culture on 8/29 growing Staph Lugdunensus which is considered to be a pathologic CoNS. Patient also w/ cardiac device and Remodulin pump. Would obtain ID opinion.   -CW Sildenafil 20 mg po tid.   -CW Remodulin SQ pump at 42 ng/kg/min     -CW chronic prednisone at 10 mg po daily.   -Pt of Dr. Yoon. Will discuss.   -CW ipratropium prn.   -Diuretic mgmt per HF.   -Being evaluated by transplant team.   -Aggressive physical therapy. PT/OT consult. PM&R. Out of bed to chair daily.   -Optimize nutrition. Consult nutrition.  -All teams contribution to care greatly appreciated       Dr. Ramesh Echevarria,   Pulmonary and Critical Care Medicine Fellow   Available via Microsoft Teams - **Preferred**  Pulmonary Spectra #[id number] ([facility]) / [id number] ([facility])  Pager:  [phone]   42F PMHx of pulmonary HTN (group I and group IV; on Treprostinil and Xarelto), home o2 (2L), ILD, JULIA, obesity, and PPM presents on 8/28 for pain, nausea and vomiting, found to be in profound RV failure s/p dobutamine, Marcela, and aggressive diuresis. Patient also noted to have RA clot in transit on TTE s/p mechanical thrombectomy which was complicated by left groin pseudoaneurysm s/p thrombin injection. Course complicated by EDOUARD likely cardiorenal which has improved.    TTE done which showed hyperdynamic and compressed LV, severe RV dysfunction, mod TR, dilated IVC. Repeat TTE with improved RV function however RA thrombus noted.   CTA:  marked enlargement of PA. No PE. B/L GGOs.     Recommendations  -CW Sildenafil 20 mg po tid.   -CW Remodulin SQ pump at 42 ng/kg/min     -CW chronic prednisone at 10 mg po daily.   -Pt of Dr. Yoon. Will discuss.   -CW ipratropium prn.   -Diuretic mgmt per HF.   -Being evaluated by transplant team.   -Aggressive physical therapy. PT/OT consult. PM&R. Out of bed to chair daily.   -Optimize nutrition. Consult nutrition.  -Of note, patient with positive blood culture on 8/29 growing Staph Lugdunensus which is considered to be a pathologic CoNS. Patient also w/ cardiac device and Remodulin pump. Would obtain ID opinion.   -All teams contribution to care greatly appreciated     Dr. Ramesh Echevarria,   Pulmonary and Critical Care Medicine Fellow   Available via [employer] Teams - **Preferred**  Pulmonary Spectra #[id number] ([facility]) / [id number] ([facility])  Pager:  [phone]

## 2022-09-07 NOTE — PROGRESS NOTE ADULT - PROBLEM SELECTOR PLAN 4
- hematoma of left groin, no evidence of pseudoaneurysm.  - continue to monitor - likely congestive hepatopathy  - downtrending and improving.

## 2022-09-07 NOTE — CONSULT NOTE ADULT - ASSESSMENT
42 yr old female with stated hx significant for PulmHTN class I/VI on Treprostinil therapy, Chronic PE who originally presented with palpitations accompanied by orthopnea/N/V/RUQ abd pain over a 2 day period. Course c/b hypotension secondary to acute on chronic cor pulmonale with end organ dysfx (EDOUARD, transaminitis), and right atrial clot requiring catheter guided thrombectomy, as well as thrombin injection of right femoral pseudoaneurysm.     Patient found ot have up-trending white count with high of 16 this AM. One set of blood cultures from 8/29 positive for staph lugdunensis. TTE from 8/29 and 8/31 identified no vegetations. Repeat cultures from 8/31 NGTD. Central venous line and A-line placed on 9/2, after positive culture. Patient with catheter directed thrombectomy on 8/31.   Patient with hx of PPM placed in 2016. Continuous Treprostinil pumped placed (WHEN?)          42 yr old female with stated hx significant for PulmHTN class I/VI on Treprostinil therapy, Chronic PE who originally presented with palpitations accompanied by orthopnea/N/V/RUQ abd pain over a 2 day period. Course c/b hypotension secondary to acute on chronic cor pulmonale with end organ dysfx (EDOUARD, transaminitis), and right atrial clot requiring catheter guided thrombectomy, as well as thrombin injection of right femoral pseudoaneurysm.     Patient found to have up-trending white count with high of 16 this AM. One set of blood cultures from 8/29 positive for staph lugdunensis. TTE from 8/29 and 8/31 identified no vegetations. Repeat cultures from 8/31 NGTD. Central venous line and A-line placed on 9/2, after positive culture. Patient with catheter directed thrombectomy on 8/31.   Patient with hx of PPM placed in 2016. Continuous Treprostinil pumped removed from site in RLQ following increased tenderness and swelling approximately 3 days ago, with patient re-implanting infusion pump in LLQ on her own. Medical team never notified.     Patient with positive culture of Staph Lugdunensis, which is typically pathologic and not a contaminant, i/s/o worsening sub-q and skin infection from previous Remodulin placement site. Patient empirically treated with course of aztreonam 2000mg BID from 8/29-9/1 for empiric coverage out of concern for sepsis contributing to hypotension i/s/o elevated white count, but this would have little coverage for Staph Lugdunensis.   - would repeat blood cultures as it seems likely site of infection has worsened since when last cultures were drawn   - would consider MYRIAM   - would treat with Vancomycin, but slow down infusion rate i/s/o previous rxn with itchiness from vanc administration             42 yr old female with stated hx significant for PulmHTN class I/VI on Treprostinil therapy, Chronic PE who originally presented with palpitations accompanied by orthopnea/N/V/RUQ abd pain over a 2 day period. Course c/b hypotension secondary to acute on chronic cor pulmonale with end organ dysfx (EDOUARD, transaminitis), and right atrial clot requiring catheter guided thrombectomy, as well as thrombin injection of right femoral pseudoaneurysm.     Patient found to have up-trending white count with high of 16 this AM. One set of blood cultures from 8/29 positive for staph lugdunensis. TTE from 8/29 and 8/31 identified no vegetations. Repeat cultures from 8/31 NGTD. Central venous line and A-line placed on 9/2, after positive culture. Patient with catheter directed thrombectomy on 8/31.   Patient with hx of PPM placed in 2016. Continuous Treprostinil pumped removed from site in RLQ following increased tenderness and swelling approximately 3 days ago, with patient re-implanting infusion pump in LLQ on her own. Medical team never notified.     Patient with positive culture of Staph Lugdunensis, which is typically pathologic and not a contaminant, i/s/o worsening sub-q and skin infection from previous Remodulin placement site. Patient empirically treated with course of aztreonam 2000mg BID from 8/29-9/1 out of concern for sepsis contributing to hypotension i/s/o elevated white count, but this would have little coverage for Staph Lugdunensis.   - would repeat blood cultures as it seems likely site of infection has worsened since last cultures were drawn   - would consider MYRIAM   - would treat with Vancomycin, but slow down infusion rate i/s/o previous rxn with itchiness from vanc administration             42 yr old female with stated hx significant for PulmHTN class I/VI on Treprostinil therapy, Chronic PE who originally presented with palpitations accompanied by orthopnea/N/V/RUQ abd pain over a 2 day period. Course c/b hypotension secondary to acute on chronic cor pulmonale with end organ dysfx (EDOUARD, transaminitis), and right atrial clot requiring catheter guided thrombectomy, as well as thrombin injection of right femoral pseudoaneurysm.     Patient found to have up-trending white count with high of 16 this AM. One set of blood cultures from 8/29 positive for staph lugdunensis. TTE from 8/29 and 8/31 identified no vegetations. Repeat cultures from 8/31 NGTD. Central venous line and A-line placed on 9/2, after positive culture. Patient with catheter directed thrombectomy on 8/31.   Patient with hx of PPM placed in 2016. Continuous Treprostinil pumped removed from site in RLQ following increased tenderness and swelling approximately 3 days ago, with patient re-implanting infusion pump in LLQ on her own. Medical team never notified.     Patient with positive culture of Staph Lugdunensis, which is typically pathologic and not a contaminant, i/s/o worsening sub-q and skin infection from previous Remodulin placement site. Patient empirically treated with course of aztreonam 2000mg BID from 8/29-9/1 out of concern for sepsis contributing to hypotension i/s/o elevated white count, but this would have little coverage for Staph Lugdunensis.   - would repeat blood cultures as it seems likely site of infection has worsened since last cultures were drawn; obtain blood cultures prior to starting abx  - would treat with cefazolin i/s/o PPM for 4 weeks total course of treatment   - would obtain MYRIAM   - remove RLQ remaining hardware from infusion pump         42 yr old female with stated hx significant for PulmHTN class I/VI on Treprostinil therapy, Chronic PE who originally presented with palpitations accompanied by orthopnea/N/V/RUQ abd pain over a 2 day period. Course c/b hypotension secondary to acute on chronic cor pulmonale with end organ dysfx (EDOUARD, transaminitis), and right atrial clot requiring catheter guided thrombectomy, as well as thrombin injection of right femoral pseudoaneurysm.     Patient found to have up-trending white count with high of 16 this AM. One set of blood cultures from 8/29 positive for staph lugdunensis. TTE from 8/29 and 8/31 identified no vegetations. Repeat cultures from 8/31 NGTD. Central venous line and A-line placed on 9/2, after positive culture. Patient with catheter directed thrombectomy on 8/31.   Patient with hx of PPM placed in 2016. Continuous Treprostinil pumped removed from site in RLQ following increased tenderness and swelling approximately 3 days ago, with patient re-implanting infusion pump in LLQ on her own.     Patient with positive culture of Staph Lugdunensis, which is typically pathologic and not a contaminant, i/s/o worsening sub-q and skin infection with likely underlying abscess from previous Remodulin placement site. Patient empirically treated with course of aztreonam 2000mg BID from 8/29-9/1 out of concern for sepsis contributing to hypotension i/s/o elevated white count, but this would have little coverage for Staph Lugdunensis.   - remove remaining hardware from infusion pump  - drain underlying abscess  - would repeat blood cultures as it seems likely site of infection has worsened since last cultures were drawn; obtain blood cultures prior to starting abx  - would treat with cefazolin i/s/o PPM for 4 weeks total course of treatment   - would obtain MYRIAM   - would obtain imaging of left groin area i/s/o tenderness and hematoma formation

## 2022-09-07 NOTE — CHART NOTE - NSCHARTNOTEFT_GEN_A_CORE
MAR Accept Note  Transfer to:  Medicine  Accepting Attending Physician:  Dr. VEENA Reddy  Assigned Room:  8MON, 812D    Patient seen and examined.   Labs and data reviewed.   No findings precluding transfer of service.       HPI/MICU COURSE:   Please refer to MICU transfer note for full details. Briefly, this is a 42 yr old female with PMHx of PulmHTN class I/VI (dx 2014) on continuous Treprostinil (Remodulin) (sildenafil - held initially) c/b Rt heart failure s/p PPM (Dual chamber 2016), Chronic P.E. on rivaroxaban (dx 2017), SVT s/p ablation (5/2020), Asthma (chronic steroid use - prednisone), Fe deficiency anemia who originally admitted to medicine floor  8/28/22 with c/o of palpitations accompanied by orthopnea/N/V/RUQ abd pain over a 2 day period.  This short hospital course c/b hypotension with SBP 80/41 accompanied by chest pain, transaminitis, EDOUARD with peak sCr 3.05 (now resolved, baseline sCr of 0.82) requiring transfer to MICU the evening of 8/28/22 for hypotension in setting of acute on chronic cor pulmonale/pulmHTN with end organ dysfunction (EDOUARD, transaminitis). Pt placed on dobutamine/diuretic/Marcela therapies. Course further c/b TTE (8/31/22) finding of new echogenic mass in RA concerning for clot in transit, CTA chest (8/31/22) re-demonstrated severe dilatation of Pulm arteries, without new P.E., received catheter - directed thrombus aspiration (see note interventional Cards 8/31/22).  Marcela weaned off 9/2. Left common femoral artery pseudoaneurysm was thrombin injected by vascular 9/3 was successful. Heparin gtt was resumed ON . Weaned off fio2 ; maintain 02sat > 94%  . C/w nasal moisturizer; no active bleeding . Tolerating increase sildenafil dose. Dobutamine gtt wean off 9/6 am . Discuss further plan of care with Dr. Yoon and Pulmonary Team. Transplant Team to re-evaluate patient 2/2 patients weight loss as was discussed between the transplant team and the patient prior to this admission.         For Followup:  -Restart Heparin gtt at 0500 9/7/2022- heparin gtt was held To d/c TLC- restart gtt at 1200/H- goal 60-99 PTT  -f/u with Dr. Yoon / [facility]   -f/u Cardiology  -f/u Vascular Surgery  -f/u transplant team.    David Rollins MD  Internal Medicine PGY-3

## 2022-09-07 NOTE — PROGRESS NOTE ADULT - PROBLEM SELECTOR PLAN 3
- hyperdynamic LVSF, new echogenic mass in RA / clot s/p cath directed thrombus aspiration ib 9/3 c/b L common femoral artery pseudoaneurysm, 9/6 repeat duplex- found +hematoma w/o pseudoaneurysm f/u by vascular sx   - heparin gtt

## 2022-09-07 NOTE — CONSULT NOTE ADULT - ASSESSMENT
42 yr old female with PMHx of PulmHTN class I/VI (dx 2014) on continuous Treprostinil (Remodulin) (sildenafil - held initially) c/b Rt heart failure s/p PPM (Dual chamber 2016), Chronic P.E. on rivaroxaban (dx 2017), SVT s/p ablation (5/2020), Asthma (chronic steroid use - prednisone), Fe deficiency anemia admitted on 8/28 for palpitations accompanied by orthopnea/N/V/RUQ abd pain over a 2 day period.  Hospital course c/b hypotension, EDOUARD, transaminitis due to RV sided heart failure requiring Marcela and inotropic support, found to have RA thrombus started on AC, s/p directed thrombus aspiration 8/31/22, c/b Left common femoral artery pseudoaneurysm s/p thormbin injection on 9/3. Patient consulted for lung transplant evaluation.    #pre lung transplant evaluation  -weight 207 following aggressive diuresis, previously seen as outpatient weighed 227lb  -currently on RA sats 90-95%  -discussed lung transplant process and the importance of pre surgical optimization with exercise and weight loss  -recommend PT for rehab and Nutrition for pre transplant counseling  42 yr old female with PMHx of PulmHTN class I/VI (dx 2014) on continuous Treprostinil (Remodulin) (sildenafil - held initially) c/b Rt heart failure s/p PPM (Dual chamber 2016), Chronic P.E. on rivaroxaban (dx 2017), SVT s/p ablation (5/2020), Asthma (chronic steroid use - prednisone), Fe deficiency anemia admitted on 8/28 for palpitations accompanied by orthopnea/N/V/RUQ abd pain over a 2 day period.  Hospital course c/b hypotension, EDOUARD, transaminitis due to RV sided heart failure requiring Marcela and inotropic support, found to have RA thrombus started on AC, s/p directed thrombus aspiration 8/31/22, c/b Left common femoral artery pseudoaneurysm s/p thormbin injection on 9/3. Patient consulted for lung transplant evaluation.    #pre lung transplant evaluation  -weight 207 following aggressive diuresis, previously seen as outpatient weighed 227lb  -currently on RA sats 90-95%  -discussed lung transplant process and the importance of pre surgical optimization with exercise and weight loss, patient is in agreement and motivated  -recommend PT for rehab and Nutrition for pre transplant counseling during this admisison   -will get records from [facility] patient states she completed work up for transplant wiht weight as her only limiting factor to being listed  -will continue to follow    Above discussed with Dr Lou 42 yr old female with PMHx of PulmHTN class I/VI (dx 2014) on continuous Treprostinil (Remodulin) (sildenafil - held initially) c/b Rt heart failure s/p PPM (Dual chamber 2016), Chronic P.E. on rivaroxaban (dx 2017), SVT s/p ablation (5/2020), Asthma (chronic steroid use - prednisone), Fe deficiency anemia admitted on 8/28 for palpitations accompanied by orthopnea/N/V/RUQ abd pain over a 2 day period.  Hospital course c/b hypotension, EDOUARD, transaminitis due to RV sided heart failure requiring Marcela and inotropic support, found to have RA thrombus started on AC, s/p directed thrombus aspiration 8/31/22, c/b Left common femoral artery pseudoaneurysm s/p thormbin injection on 9/3. Patient consulted for lung transplant evaluation.    #pre lung transplant evaluation  -weight 207 following aggressive diuresis, previously seen as outpatient weighed 227lb  --please obtain standing weight  -currently on RA sats 90-95%  -discussed lung transplant process and the importance of pre surgical optimization with exercise and weight loss, patient is in agreement and motivated  -recommend PT for rehab and Nutrition for pre transplant counseling during this admisison , encourage patient to ambulate in unit  -will get records from [facility] patient states she completed work up for transplant wiht weight as her only limiting factor to being listed  -will continue to follow    Above discussed with Dr Lou

## 2022-09-07 NOTE — CONSULT NOTE ADULT - SUBJECTIVE AND OBJECTIVE BOX
PULMONARY SERVICE INITIAL CONSULT NOTE    HPI:  42F PMHx of pulmonary HTN (group I and group IV; on Treprostinil and Xarelto), ILD, JULIA, obesity presents on 8/28 for pain, nausea and vomiting, found to be in profound RV failure s/p dobutamine, Marcela, and aggressive diuresis. Patient also noted to have RA clot in transit on TTE s/p mechanical thrombectomy which was complicated by left groin pseudoaneurysm s/p thrombin injection. Course complicated by EDOUARD likely cardiorenal which has improved.    Patient seen and examined at bedside.       TTE done which showed hyperdynamic and compressed LV, severe RV dysfunction, mod TR, dilated IVC. Repeat TTE with improved RV function however RA thrombus noted. Overall stage D, NYHA class IV with severe pulmonary HTN.    Patient seen and examined at bedside.     REVIEW OF SYSTEMS:  All additional ROS negative.    PAST MEDICAL & SURGICAL HISTORY:  Tachycardia      Anemia      Pulmonary hypertension      Pulmonary embolism      Asthma  On home O2 nightly at 2L via NC      PE (pulmonary thromboembolism)  on Xarelto 10 mg daily      Sinusitis      Corneal disorder      Right heart failure      CAD (coronary artery disease)      H/O pulmonary hypertension      Pulmonary embolism      Asthma      History of tachycardia      On supplemental oxygen by nasal cannula  O2 @ 2L      Pacemaker      Skin mass  left upper thigh mass      History of tubal ligation      Pacemaker      H/O tubal ligation      History of pacemaker  12/19 - [employer] model Ingevity 4469          FAMILY HISTORY:  Family history of diabetes mellitus  in brother    Family history of diabetes mellitus in mother    FH: anemia        SOCIAL HISTORY:  Smoking Status: [ ] Current, [ ] Former, [ ] Never  Pack Years:    MEDICATIONS:  Pulmonary:  ipratropium    for Nebulization 500 MICROGram(s) Nebulizer every 6 hours PRN    Antimicrobials:    Anticoagulants:  heparin  Infusion 1250 Unit(s)/Hr IV Continuous <Continuous>    Onc:    GI/:  aluminum hydroxide/magnesium hydroxide/simethicone Suspension 30 milliLiter(s) Oral every 4 hours PRN  pantoprazole    Tablet 40 milliGRAM(s) Oral before breakfast  polyethylene glycol 3350 17 Gram(s) Oral every 12 hours  senna 2 Tablet(s) Oral at bedtime    Endocrine:  predniSONE   Tablet 10 milliGRAM(s) Oral daily    Cardiac:  sildenafil (REVATIO) 20 milliGRAM(s) Oral <User Schedule>  spironolactone 25 milliGRAM(s) Oral daily    Other Medications:  acetaminophen     Tablet .. 650 milliGRAM(s) Oral every 6 hours PRN  artificial  tears Solution 1 Drop(s) Both EYES three times a day PRN  chlorhexidine 4% Liquid 1 Application(s) Topical <User Schedule>  cyanocobalamin 1000 MICROGram(s) Oral daily  HYDROmorphone  Injectable 0.5 milliGRAM(s) IV Push every 4 hours PRN  melatonin 3 milliGRAM(s) Oral at bedtime PRN  ondansetron Injectable 4 milliGRAM(s) IV Push every 8 hours PRN  Remodulin (Treprostinil) 5mG/mL 1 Vial(s) 1 Vial(s) SubCutaneous Continuous Pump  sodium chloride 0.65% Nasal 1 Spray(s) Both Nostrils every 6 hours PRN  sodium chloride 0.9% lock flush 10 milliLiter(s) IV Push every 1 hour PRN      Allergies    adhesives (Rash)  penicillin (Rash)  penicillin (Unknown)  vancomycin (Rash)  vancomycin (Unknown)    Intolerances    albuterol (Other; Rash)      PHYSICAL EXAM  Vital Signs Last 24 Hrs  T(C): 36.9 (07 Sep 2022 03:50), Max: 37.7 (06 Sep 2022 20:00)  T(F): 98.5 (07 Sep 2022 03:50), Max: 99.9 (06 Sep 2022 20:00)  HR: 120 (07 Sep 2022 06:40) (81 - 120)  BP: 100/60 (07 Sep 2022 06:40) (97/65 - 109/68)  BP(mean): 76 (07 Sep 2022 03:00) (76 - 76)  RR: 18 (07 Sep 2022 03:50) (18 - 36)  SpO2: 92% (07 Sep 2022 03:50) (92% - 100%)    Parameters below as of 07 Sep 2022 03:50  Patient On (Oxygen Delivery Method): room air        09-06 @ 07:01  -  09-07 @ 07:00  --------------------------------------------------------  IN: 849.6 mL / OUT: 1410 mL / NET: -560.4 mL          CONSTITUTIONAL: No acute distress.   HEENT:  Conjunctiva clear B/L.  Moist oral mucosa.   Cardiovascular: RRR with no murmurs. No JVD noted. No lower extremity edema B/L. Extremities are warm and well perfused.    Respiratory: Lungs CTAB. No wrr. No accessory muscle use.   Gastrointestinal:  Soft, nontender. Non-distended. Non-rigid.    Neurologic:  Alert and awake. Moving all extremities. Following commands.    Skin:  No gross rashes notes.      LABS:      CBC Full  -  ( 07 Sep 2022 07:21 )  WBC Count : 16.06 K/uL  RBC Count : 5.58 M/uL  Hemoglobin : 12.7 g/dL  Hematocrit : 40.6 %  Platelet Count - Automated : 284 K/uL  Mean Cell Volume : 72.8 fl  Mean Cell Hemoglobin : 22.8 pg  Mean Cell Hemoglobin Concentration : 31.3 gm/dL  Auto Neutrophil # : x  Auto Lymphocyte # : x  Auto Monocyte # : x  Auto Eosinophil # : x  Auto Basophil # : x  Auto Neutrophil % : x  Auto Lymphocyte % : x  Auto Monocyte % : x  Auto Eosinophil % : x  Auto Basophil % : x    09-07    133<L>  |  98  |  9   ----------------------------<  100<H>  4.2   |  24  |  0.76    Ca    9.0      07 Sep 2022 00:21  Phos  3.6     09-07  Mg     1.9     09-07    TPro  7.0  /  Alb  3.7  /  TBili  0.8  /  DBili  x   /  AST  27  /  ALT  103<H>  /  AlkPhos  58  09-07    PT/INR - ( 07 Sep 2022 00:21 )   PT: 12.4 sec;   INR: 1.08 ratio         PTT - ( 07 Sep 2022 00:21 )  PTT:58.9 sec                  RADIOLOGY & ADDITIONAL STUDIES: PULMONARY SERVICE INITIAL CONSULT NOTE    HPI:  42F PMHx of pulmonary HTN (group I and group IV; on Treprostinil and Xarelto), ILD, JULIA, obesity presents on 8/28 for pain, nausea and vomiting, found to be in profound RV failure s/p dobutamine, Marcela, and aggressive diuresis. Patient also noted to have RA clot in transit on TTE s/p mechanical thrombectomy which was complicated by left groin pseudoaneurysm s/p thrombin injection. Course complicated by EDOUARD likely cardiorenal which has improved.    Patient seen and examined at bedside.   SOUZA. Not at rest.       REVIEW OF SYSTEMS:  All additional ROS negative.    PAST MEDICAL & SURGICAL HISTORY:  Tachycardia      Anemia      Pulmonary hypertension      Pulmonary embolism      Asthma  On home O2 nightly at 2L via NC      PE (pulmonary thromboembolism)  on Xarelto 10 mg daily      Sinusitis      Corneal disorder      Right heart failure      CAD (coronary artery disease)      H/O pulmonary hypertension      Pulmonary embolism      Asthma      History of tachycardia      On supplemental oxygen by nasal cannula  O2 @ 2L      Pacemaker      Skin mass  left upper thigh mass      History of tubal ligation      Pacemaker      H/O tubal ligation      History of pacemaker  12/19 - [employer] model Ingevity 4469          FAMILY HISTORY:  Family history of diabetes mellitus  in brother    Family history of diabetes mellitus in mother    FH: anemia        SOCIAL HISTORY:  Smoking Status: [ ] Current, [ ] Former, [ ] Never  Pack Years:    MEDICATIONS:  Pulmonary:  ipratropium    for Nebulization 500 MICROGram(s) Nebulizer every 6 hours PRN    Antimicrobials:    Anticoagulants:  heparin  Infusion 1250 Unit(s)/Hr IV Continuous <Continuous>    Onc:    GI/:  aluminum hydroxide/magnesium hydroxide/simethicone Suspension 30 milliLiter(s) Oral every 4 hours PRN  pantoprazole    Tablet 40 milliGRAM(s) Oral before breakfast  polyethylene glycol 3350 17 Gram(s) Oral every 12 hours  senna 2 Tablet(s) Oral at bedtime    Endocrine:  predniSONE   Tablet 10 milliGRAM(s) Oral daily    Cardiac:  sildenafil (REVATIO) 20 milliGRAM(s) Oral <User Schedule>  spironolactone 25 milliGRAM(s) Oral daily    Other Medications:  acetaminophen     Tablet .. 650 milliGRAM(s) Oral every 6 hours PRN  artificial  tears Solution 1 Drop(s) Both EYES three times a day PRN  chlorhexidine 4% Liquid 1 Application(s) Topical <User Schedule>  cyanocobalamin 1000 MICROGram(s) Oral daily  HYDROmorphone  Injectable 0.5 milliGRAM(s) IV Push every 4 hours PRN  melatonin 3 milliGRAM(s) Oral at bedtime PRN  ondansetron Injectable 4 milliGRAM(s) IV Push every 8 hours PRN  Remodulin (Treprostinil) 5mG/mL 1 Vial(s) 1 Vial(s) SubCutaneous Continuous Pump  sodium chloride 0.65% Nasal 1 Spray(s) Both Nostrils every 6 hours PRN  sodium chloride 0.9% lock flush 10 milliLiter(s) IV Push every 1 hour PRN      Allergies    adhesives (Rash)  penicillin (Rash)  penicillin (Unknown)  vancomycin (Rash)  vancomycin (Unknown)    Intolerances    albuterol (Other; Rash)      PHYSICAL EXAM  Vital Signs Last 24 Hrs  T(C): 36.9 (07 Sep 2022 03:50), Max: 37.7 (06 Sep 2022 20:00)  T(F): 98.5 (07 Sep 2022 03:50), Max: 99.9 (06 Sep 2022 20:00)  HR: 120 (07 Sep 2022 06:40) (81 - 120)  BP: 100/60 (07 Sep 2022 06:40) (97/65 - 109/68)  BP(mean): 76 (07 Sep 2022 03:00) (76 - 76)  RR: 18 (07 Sep 2022 03:50) (18 - 36)  SpO2: 92% (07 Sep 2022 03:50) (92% - 100%)    Parameters below as of 07 Sep 2022 03:50  Patient On (Oxygen Delivery Method): room air        09-06 @ 07:01  -  09-07 @ 07:00  --------------------------------------------------------  IN: 849.6 mL / OUT: 1410 mL / NET: -560.4 mL          CONSTITUTIONAL: No acute distress.   HEENT:  Conjunctiva clear B/L.  Moist oral mucosa.   Cardiovascular: RRR with no murmurs. No JVD noted. Trace lower extremity edema B/L. Extremities are warm and well perfused.    Respiratory: Dec bs. No accessory muscle use.   Gastrointestinal:  Soft, nontender. Non-distended. Non-rigid.    Neurologic:  Alert and awake. Moving all extremities. Following commands.    Skin: Left groin bruising noted.   Lines: peripheral; treprostinil pump noted.       LABS:      CBC Full  -  ( 07 Sep 2022 07:21 )  WBC Count : 16.06 K/uL  RBC Count : 5.58 M/uL  Hemoglobin : 12.7 g/dL  Hematocrit : 40.6 %  Platelet Count - Automated : 284 K/uL  Mean Cell Volume : 72.8 fl  Mean Cell Hemoglobin : 22.8 pg  Mean Cell Hemoglobin Concentration : 31.3 gm/dL  Auto Neutrophil # : x  Auto Lymphocyte # : x  Auto Monocyte # : x  Auto Eosinophil # : x  Auto Basophil # : x  Auto Neutrophil % : x  Auto Lymphocyte % : x  Auto Monocyte % : x  Auto Eosinophil % : x  Auto Basophil % : x    09-07    133<L>  |  98  |  9   ----------------------------<  100<H>  4.2   |  24  |  0.76    Ca    9.0      07 Sep 2022 00:21  Phos  3.6     09-07  Mg     1.9     09-07    TPro  7.0  /  Alb  3.7  /  TBili  0.8  /  DBili  x   /  AST  27  /  ALT  103<H>  /  AlkPhos  58  09-07    PT/INR - ( 07 Sep 2022 00:21 )   PT: 12.4 sec;   INR: 1.08 ratio         PTT - ( 07 Sep 2022 00:21 )  PTT:58.9 sec                  RADIOLOGY & ADDITIONAL STUDIES:

## 2022-09-07 NOTE — PROGRESS NOTE ADULT - SUBJECTIVE AND OBJECTIVE BOX
PROGRESS NOTE:   Authored by Alfonso Simons MD   Patient is a 42y old  Female who presents with a chief complaint of Palpitations (06 Sep 2022 16:18)      SUBJECTIVE / OVERNIGHT EVENTS: No acute events overnight. No chest pain,  palpitations, dyspnea, abdominal pain. nausea, vomiting, diarrhea, blurry vision, dysuria, lightheadedness, dizziness.    ADDITIONAL REVIEW OF SYSTEMS:    MEDICATIONS  (STANDING):  chlorhexidine 4% Liquid 1 Application(s) Topical <User Schedule>  cyanocobalamin 1000 MICROGram(s) Oral daily  heparin  Infusion 1250 Unit(s)/Hr (12 mL/Hr) IV Continuous <Continuous>  pantoprazole    Tablet 40 milliGRAM(s) Oral before breakfast  polyethylene glycol 3350 17 Gram(s) Oral every 12 hours  predniSONE   Tablet 10 milliGRAM(s) Oral daily  Remodulin (Treprostinil) 5mG/mL 1 Vial(s) 1 Vial(s) SubCutaneous Continuous Pump  senna 2 Tablet(s) Oral at bedtime  sildenafil (REVATIO) 20 milliGRAM(s) Oral <User Schedule>  spironolactone 25 milliGRAM(s) Oral daily    MEDICATIONS  (PRN):  acetaminophen     Tablet .. 650 milliGRAM(s) Oral every 6 hours PRN Mild Pain (1 - 3)  aluminum hydroxide/magnesium hydroxide/simethicone Suspension 30 milliLiter(s) Oral every 4 hours PRN Dyspepsia  artificial  tears Solution 1 Drop(s) Both EYES three times a day PRN Dry Eyes  HYDROmorphone  Injectable 0.5 milliGRAM(s) IV Push every 4 hours PRN Pain  ipratropium    for Nebulization 500 MICROGram(s) Nebulizer every 6 hours PRN Bronchospasm  melatonin 3 milliGRAM(s) Oral at bedtime PRN Insomnia  ondansetron Injectable 4 milliGRAM(s) IV Push every 8 hours PRN Nausea and/or Vomiting  sodium chloride 0.65% Nasal 1 Spray(s) Both Nostrils every 6 hours PRN Nasal Congestion  sodium chloride 0.9% lock flush 10 milliLiter(s) IV Push every 1 hour PRN Pre/post blood products, medications, blood draw, and to maintain line patency      CAPILLARY BLOOD GLUCOSE        I&O's Summary    06 Sep 2022 07:01  -  07 Sep 2022 07:00  --------------------------------------------------------  IN: 849.6 mL / OUT: 1410 mL / NET: -560.4 mL        PHYSICAL EXAM:  Vital Signs Last 24 Hrs  T(C): 36.9 (07 Sep 2022 03:50), Max: 37.7 (06 Sep 2022 20:00)  T(F): 98.5 (07 Sep 2022 03:50), Max: 99.9 (06 Sep 2022 20:00)  HR: 120 (07 Sep 2022 06:40) (81 - 120)  BP: 100/60 (07 Sep 2022 06:40) (97/65 - 109/68)  BP(mean): 76 (07 Sep 2022 03:00) (76 - 76)  RR: 18 (07 Sep 2022 03:50) (18 - 36)  SpO2: 92% (07 Sep 2022 03:50) (92% - 100%)    Parameters below as of 07 Sep 2022 03:50  Patient On (Oxygen Delivery Method): room air        GENERAL: No apparent distress.   HEAD:  Atraumatic, Normocephalic  EYES: EOMI, PERRLA, conjunctiva and sclera clear  NECK: Supple, no lymphadenopathy, no JVD  CHEST/LUNG: Clear to auscultation bilateral and symmetric; No wheezes, rales, or rhonchi  HEART: S1 and S2 normal. Regular rate and rhythm; No murmurs, rubs, or gallops  ABDOMEN: Soft, non-tender, non-distended; normal bowel sounds  EXTREMITIES:  2+ peripheral pulses b/l, No clubbing, cyanosis, or edema  NEUROLOGY: A&O x 3, no focal deficits  SKIN: No rashes or lesions    LABS:                        11.6   15.98 )-----------( 256      ( 07 Sep 2022 00:21 )             37.2     09-07    133<L>  |  98  |  9   ----------------------------<  100<H>  4.2   |  24  |  0.76    Ca    9.0      07 Sep 2022 00:21  Phos  3.6     09-07  Mg     1.9     09-07    TPro  7.0  /  Alb  3.7  /  TBili  0.8  /  DBili  x   /  AST  27  /  ALT  103<H>  /  AlkPhos  58  09-07    PT/INR - ( 07 Sep 2022 00:21 )   PT: 12.4 sec;   INR: 1.08 ratio         PTT - ( 07 Sep 2022 00:21 )  PTT:58.9 sec            RADIOLOGY & ADDITIONAL TESTS:  Lab Results Reviewed   Imaging Reviewed  Electrocardiogram Reviewed   PROGRESS NOTE:   Authored by Alfonso Simons MD   Patient is a 42y old  Female who presents with a chief complaint of Palpitations (06 Sep 2022 16:18)      SUBJECTIVE / OVERNIGHT EVENTS: No acute events overnight. No chest pain,  palpitations, dyspnea, abdominal pain. nausea, vomiting, diarrhea, blurry vision, dysuria, lightheadedness, dizziness.    Patient reported replacing her subcutaneous infusion pump on the right side to the left while she was in the MICU because of redness and swelling. Patient was not able to completely remove the pump as it was stuck.     ADDITIONAL REVIEW OF SYSTEMS:    MEDICATIONS  (STANDING):  chlorhexidine 4% Liquid 1 Application(s) Topical <User Schedule>  cyanocobalamin 1000 MICROGram(s) Oral daily  heparin  Infusion 1250 Unit(s)/Hr (12 mL/Hr) IV Continuous <Continuous>  pantoprazole    Tablet 40 milliGRAM(s) Oral before breakfast  polyethylene glycol 3350 17 Gram(s) Oral every 12 hours  predniSONE   Tablet 10 milliGRAM(s) Oral daily  Remodulin (Treprostinil) 5mG/mL 1 Vial(s) 1 Vial(s) SubCutaneous Continuous Pump  senna 2 Tablet(s) Oral at bedtime  sildenafil (REVATIO) 20 milliGRAM(s) Oral <User Schedule>  spironolactone 25 milliGRAM(s) Oral daily    MEDICATIONS  (PRN):  acetaminophen     Tablet .. 650 milliGRAM(s) Oral every 6 hours PRN Mild Pain (1 - 3)  aluminum hydroxide/magnesium hydroxide/simethicone Suspension 30 milliLiter(s) Oral every 4 hours PRN Dyspepsia  artificial  tears Solution 1 Drop(s) Both EYES three times a day PRN Dry Eyes  HYDROmorphone  Injectable 0.5 milliGRAM(s) IV Push every 4 hours PRN Pain  ipratropium    for Nebulization 500 MICROGram(s) Nebulizer every 6 hours PRN Bronchospasm  melatonin 3 milliGRAM(s) Oral at bedtime PRN Insomnia  ondansetron Injectable 4 milliGRAM(s) IV Push every 8 hours PRN Nausea and/or Vomiting  sodium chloride 0.65% Nasal 1 Spray(s) Both Nostrils every 6 hours PRN Nasal Congestion  sodium chloride 0.9% lock flush 10 milliLiter(s) IV Push every 1 hour PRN Pre/post blood products, medications, blood draw, and to maintain line patency      CAPILLARY BLOOD GLUCOSE        I&O's Summary    06 Sep 2022 07:01  -  07 Sep 2022 07:00  --------------------------------------------------------  IN: 849.6 mL / OUT: 1410 mL / NET: -560.4 mL        PHYSICAL EXAM:  Vital Signs Last 24 Hrs  T(C): 36.9 (07 Sep 2022 03:50), Max: 37.7 (06 Sep 2022 20:00)  T(F): 98.5 (07 Sep 2022 03:50), Max: 99.9 (06 Sep 2022 20:00)  HR: 120 (07 Sep 2022 06:40) (81 - 120)  BP: 100/60 (07 Sep 2022 06:40) (97/65 - 109/68)  BP(mean): 76 (07 Sep 2022 03:00) (76 - 76)  RR: 18 (07 Sep 2022 03:50) (18 - 36)  SpO2: 92% (07 Sep 2022 03:50) (92% - 100%)    Parameters below as of 07 Sep 2022 03:50  Patient On (Oxygen Delivery Method): room air        GENERAL: No apparent distress.   HEAD:  Atraumatic, Normocephalic  EYES: EOMI, PERRLA, conjunctiva and sclera clear  NECK: Supple, no lymphadenopathy, no JVD  CHEST/LUNG: Clear to auscultation bilateral and symmetric; No wheezes, rales, or rhonchi  HEART: S1 and S2 normal. Regular rate and rhythm; No murmurs, rubs, or gallops  ABDOMEN: Soft, non-distended; normal bowel sounds. Retained port present on the right side of abdomen. Fluctuance, tenderness to palpation surrounding site. Left infusion port present.   EXTREMITIES:  2+ peripheral pulses b/l, No clubbing, cyanosis, or edema  NEUROLOGY: A&O x 3, no focal deficits  SKIN: No rashes or lesions    LABS:                        11.6   15.98 )-----------( 256      ( 07 Sep 2022 00:21 )             37.2     09-07    133<L>  |  98  |  9   ----------------------------<  100<H>  4.2   |  24  |  0.76    Ca    9.0      07 Sep 2022 00:21  Phos  3.6     09-07  Mg     1.9     09-07    TPro  7.0  /  Alb  3.7  /  TBili  0.8  /  DBili  x   /  AST  27  /  ALT  103<H>  /  AlkPhos  58  09-07    PT/INR - ( 07 Sep 2022 00:21 )   PT: 12.4 sec;   INR: 1.08 ratio         PTT - ( 07 Sep 2022 00:21 )  PTT:58.9 sec            RADIOLOGY & ADDITIONAL TESTS:  Lab Results Reviewed   Imaging Reviewed  Electrocardiogram Reviewed

## 2022-09-07 NOTE — CONSULT NOTE ADULT - ATTENDING COMMENTS
42 yr old female with stated hx significant for PulmHTN class I/VI on Treprostinil therapy, Chronic PE who originally presented with palpitations accompanied by orthopnea/N/V/RUQ abd pain over a 2 day period. Course c/b hypotension secondary to acute on chronic cor pulmonale with end organ dysfx (EDOUARD, transaminitis), and right atrial clot requiring catheter guided thrombectomy, as well as thrombin injection of right femoral pseudoaneurysm.     Patient found to have up-trending white count with high of 16 this AM. One set of blood cultures from 8/29 positive for staph lugdunensis. TTE from 8/29 and 8/31 identified no vegetations. Repeat cultures from 8/31 NGTD. Central venous line and A-line placed on 9/2, after positive culture. Patient with catheter directed thrombectomy on 8/31.   Patient with hx of PPM placed in 2016. Continuous Treprostinil pumped removed from site in RLQ following increased tenderness and swelling approximately 3 days ago, with patient re-implanting infusion pump in LLQ on her own.     Patient with positive culture of Staph Lugdunensis, which is typically pathogenic and not a contaminant, i/s/o worsening sub-q and skin infection with likely underlying abscess from previous Remodulin placement site. Patient empirically treated with course of aztreonam 2000mg BID from 8/29-9/1 out of concern for sepsis contributing to hypotension i/s/o elevated white count, but this would have little coverage for Staph Lugdunensis.       overall bacteremia, cellulitis and abscess, leucocytosis, tachycardia, sepsis, allergy to multiple abx.       PLAN:   - remove remaining portion from infusion pump  - drain underlying abscess  - would repeat blood cultures as it seems likely site of infection has worsened since last cultures were drawn; obtain blood cultures prior to starting abx  - would treat with cefazolin 2 gm iv q8h, pt tolerated amoxicillin and cephalosporins in past.   - would obtain MYRIAM   - would obtain imaging of left groin area i/s/o tenderness and hematoma formation  - trend cbc for leucocytosis     Plan discussed with consulting team.     Trevor Grover  Please contact through MS Teams   If no response or past 5 pm/weekend call [phone].

## 2022-09-07 NOTE — PROGRESS NOTE ADULT - ATTENDING COMMENTS
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 EDOUARD likely secondary to venous congestion- resolved.     Now transferred out of the ICU.     Staph lugdunensis bacteremia- repeat cx, start Ancef per ID input. Subcutaneous abscess secondary to device- surgery to evaluate.

## 2022-09-07 NOTE — PROGRESS NOTE ADULT - PROBLEM SELECTOR PLAN 1
Acute on chronic exacerbation of group 1 pulmonary HTN with RHF  - ipratropium q6 PRN  - HOB > 30 degrees  - sildenafil 20 mg TID  - aldactone 25 mg PO QD  - Treprostinil SQ  - f/u pulm recommendations  - f/u transplant evaluation Acute on chronic exacerbation of group I/IV pulmonary HTN with RHF  - ipratropium q6 PRN  - HOB > 30 degrees  - sildenafil 20 mg TID  - aldactone 25 mg PO QD  - Treprostinil SQ  - f/u pulm recommendations  - f/u transplant evaluation

## 2022-09-07 NOTE — CHART NOTE - NSCHARTNOTEFT_GEN_A_CORE
MICU Transfer Note    Transfer from: MICU    Transfer to: (x  ) Medicine    (  ) Telemetry     (   ) RCU        (    ) Palliative         (   ) Stroke Unit          (   ) __________________    Accepting physician: Dr. VEENA Reddy      MICU COURSE:    Patient is a 42y old  Female who presents with a chief complaint of Palpitations (05 Sep 2022 09:55) found to be in cardiogenic shock.    42 yr old female with PMHx of PulmHTN class I/VI (dx 2014) on continuous Treprostinil (Remodulin) (sildenafil - currently on hold) c/b Rt heart failure s/p PPM (Dual chamber 2016), Chronic P.E. on rivaroxaban (dx 2017), SVT s/p ablation (5/2020), Asthma (chronic steroid use - prednisone), Fe deficiency anemia who originally admitted to medicine floor  8/28/22 with c/o of palpitations accompanied by orthopnea/N/V/RUQ abd pain over a 2 day period.  This short hospital course c/b hypotension with SBP 80/41 accompanied by chest pain, transaminitis, EDOUARD with peak sCr 3.05 (now resolved, baseline sCr of 0.82) requiring transfer to MICU the evening of 8/28/22 for hypotension in setting of acute on chronic cor pulmonale/pulmHTN with end organ dysfunction (EDOUARD, transaminitis). Pt placed on dobutamine/diuretic/Marcela therapies. Course further c/b TTE (8/31/22) finding of new echogenic mass in RA concerning for clot in transit, CTA chest (8/31/22) re-demonstrated severe dilatation of Pulm arteries, without new P.E., received catheter - directed thrombus aspiration (see note interventional Cards 8/31/22).  Marcela weaned off 9/2. Left common femoral artery pseudoaneurysm was thrombin injected by vascular 9/3 was successful. Heparin gtt was resumed ON . Weaned off fio2 ; maintain 02sat > 94%  . C/w nasal moisturizer; no active bleeding . Tolerating increase sildenafil dose. Dobutamine gtt wean off 9/6 am . Discuss further plan of care with Dr. Yoon and Pulmonary Team. Transplant Team to re-evaluate patient 2/2 patients weight loss as was discussed between the transplant team and the patient prior to this admission.         ASSESSMENT & PLAN:             For Followup:          Vital Signs Last 24 Hrs  T(C): 37.6 (07 Sep 2022 00:00), Max: 37.7 (06 Sep 2022 20:00)  T(F): 99.7 (07 Sep 2022 00:00), Max: 99.9 (06 Sep 2022 20:00)  HR: 85 (07 Sep 2022 01:00) (85 - 113)  BP: --  BP(mean): --  RR: 18 (07 Sep 2022 01:00) (15 - 36)  SpO2: 97% (07 Sep 2022 01:00) (92% - 100%)    Parameters below as of 06 Sep 2022 21:34  Patient On (Oxygen Delivery Method): room air      I&O's Summary    05 Sep 2022 07:01  -  06 Sep 2022 07:00  --------------------------------------------------------  IN: 1705.2 mL / OUT: 1530 mL / NET: 175.2 mL    06 Sep 2022 07:01  -  07 Sep 2022 01:49  --------------------------------------------------------  IN: 849.6 mL / OUT: 910 mL / NET: -60.4 mL        MEDICATIONS  (STANDING):  chlorhexidine 4% Liquid 1 Application(s) Topical <User Schedule>  cyanocobalamin 1000 MICROGram(s) Oral daily  heparin  Infusion 1250 Unit(s)/Hr (12.5 mL/Hr) IV Continuous <Continuous>  pantoprazole    Tablet 40 milliGRAM(s) Oral before breakfast  polyethylene glycol 3350 17 Gram(s) Oral every 12 hours  predniSONE   Tablet 10 milliGRAM(s) Oral daily  Remodulin (Treprostinil) 5mG/mL 1 Vial(s) 1 Vial(s) SubCutaneous Continuous Pump  senna 2 Tablet(s) Oral at bedtime  sildenafil (REVATIO) 20 milliGRAM(s) Oral <User Schedule>  spironolactone 25 milliGRAM(s) Oral daily    MEDICATIONS  (PRN):  acetaminophen     Tablet .. 650 milliGRAM(s) Oral every 6 hours PRN Mild Pain (1 - 3)  aluminum hydroxide/magnesium hydroxide/simethicone Suspension 30 milliLiter(s) Oral every 4 hours PRN Dyspepsia  artificial  tears Solution 1 Drop(s) Both EYES three times a day PRN Dry Eyes  HYDROmorphone  Injectable 0.5 milliGRAM(s) IV Push every 4 hours PRN Pain  ipratropium    for Nebulization 500 MICROGram(s) Nebulizer every 6 hours PRN Bronchospasm  melatonin 3 milliGRAM(s) Oral at bedtime PRN Insomnia  ondansetron Injectable 4 milliGRAM(s) IV Push every 8 hours PRN Nausea and/or Vomiting  sodium chloride 0.65% Nasal 1 Spray(s) Both Nostrils every 6 hours PRN Nasal Congestion  sodium chloride 0.9% lock flush 10 milliLiter(s) IV Push every 1 hour PRN Pre/post blood products, medications, blood draw, and to maintain line patency        LABS                                            11.6                  Neurophils% (auto):   x      (09-07 @ 00:21):    15.98)-----------(256          Lymphocytes% (auto):  x                                             37.2                   Eosinphils% (auto):   x        Manual%: Neutrophils x    ; Lymphocytes x    ; Eosinophils x    ; Bands%: x    ; Blasts x                                    133    |  98     |  9                   Calcium: 9.0   / iCa: x      (09-07 @ 00:21)    ----------------------------<  100       Magnesium: 1.9                              4.2     |  24     |  0.76             Phosphorous: 3.6      TPro  7.0    /  Alb  3.7    /  TBili  0.8    /  DBili  x      /  AST  27     /  ALT  103    /  AlkPhos  58     07 Sep 2022 00:21    ( 09-07 @ 00:21 )   PT: 12.4 sec;   INR: 1.08 ratio  aPTT: 58.9 sec MICU Transfer Note    Transfer from: MICU    Transfer to: (x  ) Medicine    (  ) Telemetry     (   ) RCU        (    ) Palliative         (   ) Stroke Unit          (   ) __________________    Accepting physician: Dr. VEENA Reddy      MICU COURSE:    Patient is a 42y old  Female who presents with a chief complaint of Palpitations (05 Sep 2022 09:55) found to be in cardiogenic shock.    42 yr old female with PMHx of PulmHTN class I/VI (dx 2014) on continuous Treprostinil (Remodulin) (sildenafil - currently on hold) c/b Rt heart failure s/p PPM (Dual chamber 2016), Chronic P.E. on rivaroxaban (dx 2017), SVT s/p ablation (5/2020), Asthma (chronic steroid use - prednisone), Fe deficiency anemia who originally admitted to medicine floor  8/28/22 with c/o of palpitations accompanied by orthopnea/N/V/RUQ abd pain over a 2 day period.  This short hospital course c/b hypotension with SBP 80/41 accompanied by chest pain, transaminitis, EDOUARD with peak sCr 3.05 (now resolved, baseline sCr of 0.82) requiring transfer to MICU the evening of 8/28/22 for hypotension in setting of acute on chronic cor pulmonale/pulmHTN with end organ dysfunction (EDOUARD, transaminitis). Pt placed on dobutamine/diuretic/Marcela therapies. Course further c/b TTE (8/31/22) finding of new echogenic mass in RA concerning for clot in transit, CTA chest (8/31/22) re-demonstrated severe dilatation of Pulm arteries, without new P.E., received catheter - directed thrombus aspiration (see note interventional Cards 8/31/22).  Marcela weaned off 9/2. Left common femoral artery pseudoaneurysm was thrombin injected by vascular 9/3 was successful. Heparin gtt was resumed ON . Weaned off fio2 ; maintain 02sat > 94%  . C/w nasal moisturizer; no active bleeding . Tolerating increase sildenafil dose. Dobutamine gtt wean off 9/6 am . Discuss further plan of care with Dr. Yoon and Pulmonary Team. Transplant Team to re-evaluate patient 2/2 patients weight loss as was discussed between the transplant team and the patient prior to this admission.         ASSESSMENT & PLAN:             For Followup:          Vital Signs Last 24 Hrs  T(C): 37.6 (07 Sep 2022 00:00), Max: 37.7 (06 Sep 2022 20:00)  T(F): 99.7 (07 Sep 2022 00:00), Max: 99.9 (06 Sep 2022 20:00)  HR: 85 (07 Sep 2022 01:00) (85 - 113)  BP: --  BP(mean): --  RR: 18 (07 Sep 2022 01:00) (15 - 36)  SpO2: 97% (07 Sep 2022 01:00) (92% - 100%)    Parameters below as of 06 Sep 2022 21:34  Patient On (Oxygen Delivery Method): room air      I&O's Summary    05 Sep 2022 07:01  -  06 Sep 2022 07:00  --------------------------------------------------------  IN: 1705.2 mL / OUT: 1530 mL / NET: 175.2 mL    06 Sep 2022 07:01  -  07 Sep 2022 01:49  --------------------------------------------------------  IN: 849.6 mL / OUT: 910 mL / NET: -60.4 mL        MEDICATIONS  (STANDING):  chlorhexidine 4% Liquid 1 Application(s) Topical <User Schedule>  cyanocobalamin 1000 MICROGram(s) Oral daily  heparin  Infusion 1250 Unit(s)/Hr (12.5 mL/Hr) IV Continuous <Continuous>  pantoprazole    Tablet 40 milliGRAM(s) Oral before breakfast  polyethylene glycol 3350 17 Gram(s) Oral every 12 hours  predniSONE   Tablet 10 milliGRAM(s) Oral daily  Remodulin (Treprostinil) 5mG/mL 1 Vial(s) 1 Vial(s) SubCutaneous Continuous Pump  senna 2 Tablet(s) Oral at bedtime  sildenafil (REVATIO) 20 milliGRAM(s) Oral <User Schedule>  spironolactone 25 milliGRAM(s) Oral daily    MEDICATIONS  (PRN):  acetaminophen     Tablet .. 650 milliGRAM(s) Oral every 6 hours PRN Mild Pain (1 - 3)  aluminum hydroxide/magnesium hydroxide/simethicone Suspension 30 milliLiter(s) Oral every 4 hours PRN Dyspepsia  artificial  tears Solution 1 Drop(s) Both EYES three times a day PRN Dry Eyes  HYDROmorphone  Injectable 0.5 milliGRAM(s) IV Push every 4 hours PRN Pain  ipratropium    for Nebulization 500 MICROGram(s) Nebulizer every 6 hours PRN Bronchospasm  melatonin 3 milliGRAM(s) Oral at bedtime PRN Insomnia  ondansetron Injectable 4 milliGRAM(s) IV Push every 8 hours PRN Nausea and/or Vomiting  sodium chloride 0.65% Nasal 1 Spray(s) Both Nostrils every 6 hours PRN Nasal Congestion  sodium chloride 0.9% lock flush 10 milliLiter(s) IV Push every 1 hour PRN Pre/post blood products, medications, blood draw, and to maintain line patency        LABS                                            11.6                  Neurophils% (auto):   x      (09-07 @ 00:21):    15.98)-----------(256          Lymphocytes% (auto):  x                                             37.2                   Eosinphils% (auto):   x        Manual%: Neutrophils x    ; Lymphocytes x    ; Eosinophils x    ; Bands%: x    ; Blasts x                                    133    |  98     |  9                   Calcium: 9.0   / iCa: x      (09-07 @ 00:21)    ----------------------------<  100       Magnesium: 1.9                              4.2     |  24     |  0.76             Phosphorous: 3.6      TPro  7.0    /  Alb  3.7    /  TBili  0.8    /  DBili  x      /  AST  27     /  ALT  103    /  AlkPhos  58     07 Sep 2022 00:21    ( 09-07 @ 00:21 )   PT: 12.4 sec;   INR: 1.08 ratio  aPTT: 58.9 sec      MICROBIOLOGY:  Culture - Sputum . (08.31.22 @ 20:45)    Gram Stain:   Moderate polymorphonuclear leukocytes per low power field  Rare Squamous epithelial cells per low power field  Moderate Gram positive cocci in pairs seen per oil power field  Few Gram Negative Rods seen per oil power field    Specimen Source: .Sputum Sputum    Culture Results:   Normal Respiratory Laura present    Culture - Blood (08.31.22 @ 00:12)    Specimen Source: .Blood Blood-Peripheral    Culture Results:   No growth to date.    Culture - Blood (08.31.22 @ 00:12)    Specimen Source: .Blood Blood-Peripheral    Culture Results:   No growth to date.      < from: US Duplex Arterial Lower Ext Ltd, Left (09.06.22 @ 15:15) >  ACC: [id number] EXAM:  US DPLX LWR EXT ARTS LTD LT                        PROCEDURE DATE:  09/06/2022    INTERPRETATION:  Clinical information: 42-year-old status post thrombin   injection for left groin pseudoaneurysm. Assess for recurrence.    Duplex and color flow Doppler evaluation of the left groin was performed   at the bedside.    A left groin hematoma is identified, measuring 3.3 x 1.4 x 1.6 cm. There   is no evidence of pseudoaneurysm.    The left common femoral artery and vein appear patent.    IMPRESSION: Left groin hematoma. No evidence of pseudoaneurysm.    < from: US Duplex Arterial Lower Ext Ltd, Left (09.04.22 @ 11:22) >    ACC: [id number] EXAM:  US DPLX LWR EXT ARTS LTD LT                        PROCEDURE DATE:  09/04/2022    INTERPRETATION:  History: A 3.4 cm left common femoral pseudoaneurysm was   identified on 9/2/2022, follow-up examination following thrombin   injection.  There is a 4 cm thrombosed hematoma.  A small residual nubbin communicates with the underlying left common   femoral artery.    Impression: Following thrombin injection the pseudoaneurysm is thrombosed.  There remains a small nubbin communicating with the left common femoral   artery.    < from: Transthoracic Echocardiogram (08.31.22 @ 16:57) >      Patient name: ROXI MARIA  YOB: 1980   Age: 42 (F)   MR#: [id number]  Study Date: 8/31/2022  Location: [place] [id number]: Ericka Moody RDCS  Study quality: Technically good  Referring Physician: Zackary Diamond MD  BloodPressure: 104/64 mmHg  Height: 165 cm  Weight: 104 kg  BSA: 2.1 m2  ------------------------------------------------------------------------  PROCEDURE: Transthoracic echocardiogram with 2-D, M-Mode  and complete spectral and color flow Doppler.  INDICATION: Localized swelling, mass and lump, trunk  (R22.2)  ------------------------------------------------------------------------  CONCLUSIONS:  Limited TTE in CATH  1. Overall preserved left ventricular systolic function.  2. Thrombus seen in RA.  3. Right ventricular enlargement with decreased right  ventricular systolic function.  4. No pericardial effusion seen.  Dr. CHRIS Robison Aware          ASSESSMENT & PLAN:     42 yr old female with PMHx of PulmHTN class I/VI (dx 2014) on continuous Treprostinil (Remodulin) via sq pump and restarted on sildenafil 2 days ago, +Rt heart failure s/p PPM (Dual chamber 2016), Chronic P.E. on rivaroxaban (dx 2017), SVT s/p ablation (5/2020), Asthma (chronic steroid use - prednisone), Fe deficiency anemia who originally admitted to medicine floor  8/28/22 for c/o palpitations on 8/28. Hospital course c/b hypotension, CP, transaminitis, and EDOUARD in the setting of cardiogenic shock, is s/p dobutamine gtt d/c 9/6 early am, aggressive diuresis,  and INO3 d/c'd 9/2. The pt hospital course had been further c/b a RA Clot found on 8/31 s/p a directed cath thrombus aspiration c/b a L common Femoral artery pseudomonas on 9/3- now resolved as noted on the Arterial duplex done on 9/6. The pt is stable for transfer to the floor, the transplant team will re-evaulate pt.  Dr. Yoon / Pulmmonary Team to be notified of any issues and changes.    Plan:    #Neuro: No neurological issues  -c/w Neuro checks  -increase activity as tolerated  -PT consult    #Pulm: Asthma, Cor pulmonale, PulmHTN (class I/VI), PE normally on Xarelto at home- on Heparin gtt here  -Supplemental O2 prn to maintain SPO2 > 92% (currently 2 liters N/C); able to wean off to RA ; o2sat >94%   -Ipratropium q 6 hrs and prn for sob/wheezes  -HOB >/= 30 degree angle  -pHTN specialist Dr. Yoon on case; input appreciated / Call Dr. Yoon / Pulmonary ([place]) for any issues / questions  -Marcela titrated off 9/2   -Sildenafil increased to 20 mg TID (9/5) - continue- pt tolerating medication well  -Dobutamine d/c'd @ 9_40 am 9/6; Vsat pre dub 75% ; and venous sat post dub 77%.   -continue with subq Treprostinil therapy with eventual plan for IV FLOLAN ; eval 9/6    -c/w aldactone 25 mg PO q d; goal net 500 ml negative   -consider obtaining I.R. consult for tunnel catheter placement  -c/w home dose  prednisone 10 mg PO q d      #CV: resolving acute on chronic cor pulmonale due to pulmHTN, echogenic mass in RA concerning for clot in transit (found to be organized old clot)  -dobutamine gtt d/c 9/6   -c/w aldactone home dose, goal  net negative   -TTE 8/31/22 - hyperdynamic LVSF, new echogenic mass in RA / clot s/p cath directed thrombus aspiration ib 9/3 c/b L common femoral artery pseudoaneurysm, 9/6 repeat duplex- found +hematoma w/o pseudoaneurysm f/u by vascular sx   -CTA chest 8/31/22  re-demonstrated severe dilatation of Pulm arteries, without new P.E.  -Duplex bilat L.E. 9/1/22 - No DVT  -Treprostinil therapy as above  -sildenafil as above  -home med of rivaroxaban 10 mg po qd - currently on hold   -c/w pt home dose of Remodulin - on Sq Pump   -Heparin gtt resumed 9/3 --> goal 60-99 PTT-    #GI/: resolved RUQ pain, improving transaminitis (most likely was due to congestive hepatopathy), resoled EDOUARD ( pre-renal)  -received RUQ U.S. 8/30/22 - CBD 7 mm, mild hepatic steatosis  -c/w bowel regimen ; last BM 9/5   -strict I & O's   -c/w aldactone home dose  and lasix therapy prn - f/u for guidance from Pulmonary Team / Pulmonary HTn Team - Dr. Yoon and Cardiology  -daily standing weight     #ID: resolved sepsis  -Sputum Cx 8/31/22 - mod PMN, rare squamous epi cells, mod gm+ cocci in pairs, few gm- rods  -Blood Culture 8/31/22 - NGTD  -Blood Culture 8/29/22 - NGTD  -MRSA/MSSA 8/29/22 - Detected  -Blood Cultrues 8/29/22 - Staph Lugdunensis s/p tx with aztreonam (8/29-9/1)  -RVP 8/28/22 - ND  -SARS-CoV-2 8/28/22 - ND   -continue Mupirocin 2% therapy q 12 hrs x5 days (started 8/30/22)    #FEN/ENDO/HEME: improved transaminitis, resolved hypercoagulation  -obtain CMP/Mg++/PO--4/CBC w diff/PT/PTT/INR  q 12 hrs   -trend LFT's; improving   -heparin gtt in progress ; PTT goal 60-99   -bleeding precaution   -CBC /PTT q 6 and PRN     PPX  GI ppx ; PPI    DVT ppx ; heparin gtt        For Followup:  -Restart Heparin gtt at 0500 9/7/2022- heparin gtt was held To d/c TLC- restart gtt at 1200/H- goal 60-99 PTT  -f/u with Dr. Yoon / [facility]   -f/u Cardiology  -f/u transplant team MICU Transfer Note    Transfer from: MICU    Transfer to: (x  ) Medicine    (  ) Telemetry     (   ) RCU        (    ) Palliative         (   ) Stroke Unit          (   ) __________________    Accepting physician: Dr. VEENA Reddy      MICU COURSE:    Patient is a 42y old  Female who presents with a chief complaint of Palpitations (05 Sep 2022 09:55) found to be in cardiogenic shock.    42 yr old female with PMHx of PulmHTN class I/VI (dx 2014) on continuous Treprostinil (Remodulin) (sildenafil - currently on hold) c/b Rt heart failure s/p PPM (Dual chamber 2016), Chronic P.E. on rivaroxaban (dx 2017), SVT s/p ablation (5/2020), Asthma (chronic steroid use - prednisone), Fe deficiency anemia who originally admitted to medicine floor  8/28/22 with c/o of palpitations accompanied by orthopnea/N/V/RUQ abd pain over a 2 day period.  This short hospital course c/b hypotension with SBP 80/41 accompanied by chest pain, transaminitis, EDOUARD with peak sCr 3.05 (now resolved, baseline sCr of 0.82) requiring transfer to MICU the evening of 8/28/22 for hypotension in setting of acute on chronic cor pulmonale/pulmHTN with end organ dysfunction (EDOUARD, transaminitis). Pt placed on dobutamine/diuretic/Marcela therapies. Course further c/b TTE (8/31/22) finding of new echogenic mass in RA concerning for clot in transit, CTA chest (8/31/22) re-demonstrated severe dilatation of Pulm arteries, without new P.E., received catheter - directed thrombus aspiration (see note interventional Cards 8/31/22).  Marcela weaned off 9/2. Left common femoral artery pseudoaneurysm was thrombin injected by vascular 9/3 was successful. Heparin gtt was resumed ON . Weaned off fio2 ; maintain 02sat > 94%  . C/w nasal moisturizer; no active bleeding . Tolerating increase sildenafil dose. Dobutamine gtt wean off 9/6 am . Discuss further plan of care with Dr. Yoon and Pulmonary Team. Transplant Team to re-evaluate patient 2/2 patients weight loss as was discussed between the transplant team and the patient prior to this admission.         Vital Signs Last 24 Hrs  T(C): 37.6 (07 Sep 2022 00:00), Max: 37.7 (06 Sep 2022 20:00)  T(F): 99.7 (07 Sep 2022 00:00), Max: 99.9 (06 Sep 2022 20:00)  HR: 85 (07 Sep 2022 01:00) (85 - 113)  BP: --  BP(mean): --  RR: 18 (07 Sep 2022 01:00) (15 - 36)  SpO2: 97% (07 Sep 2022 01:00) (92% - 100%)    Parameters below as of 06 Sep 2022 21:34  Patient On (Oxygen Delivery Method): room air      I&O's Summary    05 Sep 2022 07:01  -  06 Sep 2022 07:00  --------------------------------------------------------  IN: 1705.2 mL / OUT: 1530 mL / NET: 175.2 mL    06 Sep 2022 07:01  -  07 Sep 2022 01:49  --------------------------------------------------------  IN: 849.6 mL / OUT: 910 mL / NET: -60.4 mL      MEDICATIONS  (STANDING):  chlorhexidine 4% Liquid 1 Application(s) Topical <User Schedule>  cyanocobalamin 1000 MICROGram(s) Oral daily  heparin  Infusion 1250 Unit(s)/Hr (12.5 mL/Hr) IV Continuous <Continuous>  pantoprazole    Tablet 40 milliGRAM(s) Oral before breakfast  polyethylene glycol 3350 17 Gram(s) Oral every 12 hours  predniSONE   Tablet 10 milliGRAM(s) Oral daily  Remodulin (Treprostinil) 5mG/mL 1 Vial(s) 1 Vial(s) SubCutaneous Continuous Pump  senna 2 Tablet(s) Oral at bedtime  sildenafil (REVATIO) 20 milliGRAM(s) Oral <User Schedule>  spironolactone 25 milliGRAM(s) Oral daily    MEDICATIONS  (PRN):  acetaminophen     Tablet .. 650 milliGRAM(s) Oral every 6 hours PRN Mild Pain (1 - 3)  aluminum hydroxide/magnesium hydroxide/simethicone Suspension 30 milliLiter(s) Oral every 4 hours PRN Dyspepsia  artificial  tears Solution 1 Drop(s) Both EYES three times a day PRN Dry Eyes  HYDROmorphone  Injectable 0.5 milliGRAM(s) IV Push every 4 hours PRN Pain  ipratropium    for Nebulization 500 MICROGram(s) Nebulizer every 6 hours PRN Bronchospasm  melatonin 3 milliGRAM(s) Oral at bedtime PRN Insomnia  ondansetron Injectable 4 milliGRAM(s) IV Push every 8 hours PRN Nausea and/or Vomiting  sodium chloride 0.65% Nasal 1 Spray(s) Both Nostrils every 6 hours PRN Nasal Congestion  sodium chloride 0.9% lock flush 10 milliLiter(s) IV Push every 1 hour PRN Pre/post blood products, medications, blood draw, and to maintain line patency        LABS                                            11.6                  Neurophils% (auto):   x      (09-07 @ 00:21):    15.98)-----------(256          Lymphocytes% (auto):  x                                             37.2                   Eosinphils% (auto):   x        Manual%: Neutrophils x    ; Lymphocytes x    ; Eosinophils x    ; Bands%: x    ; Blasts x                                    133    |  98     |  9                   Calcium: 9.0   / iCa: x      (09-07 @ 00:21)    ----------------------------<  100       Magnesium: 1.9                              4.2     |  24     |  0.76             Phosphorous: 3.6      TPro  7.0    /  Alb  3.7    /  TBili  0.8    /  DBili  x      /  AST  27     /  ALT  103    /  AlkPhos  58     07 Sep 2022 00:21    ( 09-07 @ 00:21 )   PT: 12.4 sec;   INR: 1.08 ratio  aPTT: 58.9 sec      MICROBIOLOGY:  Culture - Sputum . (08.31.22 @ 20:45)    Gram Stain:   Moderate polymorphonuclear leukocytes per low power field  Rare Squamous epithelial cells per low power field  Moderate Gram positive cocci in pairs seen per oil power field  Few Gram Negative Rods seen per oil power field    Specimen Source: .Sputum Sputum    Culture Results:   Normal Respiratory Laura present    Culture - Blood (08.31.22 @ 00:12)    Specimen Source: .Blood Blood-Peripheral    Culture Results:   No growth to date.    Culture - Blood (08.31.22 @ 00:12)    Specimen Source: .Blood Blood-Peripheral    Culture Results:   No growth to date.      < from: US Duplex Arterial Lower Ext Ltd, Left (09.06.22 @ 15:15) >  ACC: [id number] EXAM:  US DPLX LWR EXT [employer] LTD LT                        PROCEDURE DATE:  09/06/2022    INTERPRETATION:  Clinical information: 42-year-old status post thrombin   injection for left groin pseudoaneurysm. Assess for recurrence.    Duplex and color flow Doppler evaluation of the left groin was performed   at the bedside.    A left groin hematoma is identified, measuring 3.3 x 1.4 x 1.6 cm. There   is no evidence of pseudoaneurysm.    The left common femoral artery and vein appear patent.    IMPRESSION: Left groin hematoma. No evidence of pseudoaneurysm.    < from: US Duplex Arterial Lower Ext Ltd, Left (09.04.22 @ 11:22) >    ACC: [id number] EXAM:  US DPLX LWR EXT ARTS LTD LT                        PROCEDURE DATE:  09/04/2022    INTERPRETATION:  History: A 3.4 cm left common femoral pseudoaneurysm was   identified on 9/2/2022, follow-up examination following thrombin   injection.  There is a 4 cm thrombosed hematoma.  A small residual nubbin communicates with the underlying left common   femoral artery.    Impression: Following thrombin injection the pseudoaneurysm is thrombosed.  There remains a small nubbin communicating with the left common femoral   artery.    < from: Transthoracic Echocardiogram (08.31.22 @ 16:57) >      Patient name: ROXI MARIA  YOB: 1980   Age: 42 (F)   MR#: [id number]  Study Date: 8/31/2022  Location: [place] [id number]: Ericka Moody RDCS  Study quality: Technically good  Referring Physician: Zackary Diamond MD  BloodPressure: 104/64 mmHg  Height: 165 cm  Weight: 104 kg  BSA: 2.1 m2  ------------------------------------------------------------------------  PROCEDURE: Transthoracic echocardiogram with 2-D, M-Mode  and complete spectral and color flow Doppler.  INDICATION: Localized swelling, mass and lump, trunk  (R22.2)  ------------------------------------------------------------------------  CONCLUSIONS:  Limited TTE in CATH  1. Overall preserved left ventricular systolic function.  2. Thrombus seen in RA.  3. Right ventricular enlargement with decreased right  ventricular systolic function.  4. No pericardial effusion seen.  Dr. CHRIS Robison Aware          ASSESSMENT & PLAN:     42 yr old female with PMHx of PulmHTN class I/VI (dx 2014) on continuous Treprostinil (Remodulin) via sq pump and restarted on sildenafil 2 days ago, +Rt heart failure s/p PPM (Dual chamber 2016), Chronic P.E. on rivaroxaban (dx 2017), SVT s/p ablation (5/2020), Asthma (chronic steroid use - prednisone), Fe deficiency anemia who originally admitted to medicine floor  8/28/22 for c/o palpitations on 8/28. Hospital course c/b hypotension, CP, transaminitis, and EDOUARD in the setting of cardiogenic shock, is s/p dobutamine gtt d/c 9/6 early am, aggressive diuresis,  and INO3 d/c'd 9/2. The pt hospital course had been further c/b a RA Clot found on 8/31 s/p a directed cath thrombus aspiration c/b a L common Femoral artery pseudomonas on 9/3- now resolved as noted on the Arterial duplex done on 9/6. The pt is stable for transfer to the floor, the transplant team will re-evaulate pt.  Dr. Yoon / Pulmmonary Team to be notified of any issues and changes.    Plan:    #Neuro: No neurological issues  -c/w Neuro checks  -increase activity as tolerated  -PT consult    #Pulm: Asthma, Cor pulmonale, PulmHTN (class I/VI), PE normally on Xarelto at home- on Heparin gtt here  -Supplemental O2 prn to maintain SPO2 > 92% (currently 2 liters N/C); able to wean off to RA ; o2sat >94%   -Ipratropium q 6 hrs and prn for sob/wheezes  -HOB >/= 30 degree angle  -pHTN specialist Dr. Yoon on case; input appreciated / Call Dr. Yoon / Pulmonary ([place]) for any issues / questions  -Marcela titrated off 9/2   -Sildenafil increased to 20 mg TID (9/5) - continue- pt tolerating medication well  -Dobutamine d/c'd @ 9_40 am 9/6; Vsat pre dub 75% ; and venous sat post dub 77%.   -continue with subq Treprostinil therapy with eventual plan for IV FLOLAN ; eval 9/6    -c/w aldactone 25 mg PO q d; goal net 500 ml negative   -consider obtaining I.R. consult for tunnel catheter placement  -c/w home dose  prednisone 10 mg PO q d      #CV: resolving acute on chronic cor pulmonale due to pulmHTN, echogenic mass in RA concerning for clot in transit (found to be organized old clot)  -dobutamine gtt d/c 9/6   -c/w aldactone home dose, goal  net negative   -TTE 8/31/22 - hyperdynamic LVSF, new echogenic mass in RA / clot s/p cath directed thrombus aspiration ib 9/3 c/b L common femoral artery pseudoaneurysm, 9/6 repeat duplex- found +hematoma w/o pseudoaneurysm f/u by vascular sx   -CTA chest 8/31/22  re-demonstrated severe dilatation of Pulm arteries, without new P.E.  -Duplex bilat L.E. 9/1/22 - No DVT  -Treprostinil therapy as above  -sildenafil as above  -home med of rivaroxaban 10 mg po qd - currently on hold   -c/w pt home dose of Remodulin - on Sq Pump   -Heparin gtt resumed 9/3 --> goal 60-99 PTT-    #GI/: resolved RUQ pain, improving transaminitis (most likely was due to congestive hepatopathy), resoled EDOUADR ( pre-renal)  -received RUQ U.S. 8/30/22 - CBD 7 mm, mild hepatic steatosis  -c/w bowel regimen ; last BM 9/5   -strict I & O's   -c/w aldactone home dose  and lasix therapy prn - f/u for guidance from Pulmonary Team / Pulmonary HTn Team - Dr. Yoon and Cardiology  -daily standing weight     #ID: resolved sepsis  -Sputum Cx 8/31/22 - mod PMN, rare squamous epi cells, mod gm+ cocci in pairs, few gm- rods  -Blood Culture 8/31/22 - NGTD  -Blood Culture 8/29/22 - NGTD  -MRSA/MSSA 8/29/22 - Detected  -Blood Cultrues 8/29/22 - Staph Lugdunensis s/p tx with aztreonam (8/29-9/1)  -RVP 8/28/22 - ND  -SARS-CoV-2 8/28/22 - ND   -continue Mupirocin 2% therapy q 12 hrs x5 days (started 8/30/22)    #FEN/ENDO/HEME: improved transaminitis, resolved hypercoagulation  -obtain CMP/Mg++/PO--4/CBC w diff/PT/PTT/INR  q 12 hrs   -trend LFT's; improving   -heparin gtt in progress ; PTT goal 60-99   -bleeding precaution   -CBC /PTT q 6 and PRN     PPX  GI ppx ; PPI    DVT ppx / PE / RA clot recent - on heparin gtt        For Followup:  -Restart Heparin gtt at 0500 9/7/2022- heparin gtt was held To d/c TLC- restart gtt at 1200/H- goal 60-99 PTT  -f/u with Dr. Yoon / [facility]   -f/u Cardiology  -f/u Vascular Surgery  -f/u transplant team MICU Transfer Note    Transfer from: MICU    Transfer to: (x  ) Medicine    (  ) Telemetry     (   ) RCU        (    ) Palliative         (   ) Stroke Unit          (   ) __________________    Accepting physician: Dr. VEENA Reddy      MICU COURSE:    Patient is a 42y old  Female who presents with a chief complaint of Palpitations (05 Sep 2022 09:55) found to be in cardiogenic shock.    42 yr old female with PMHx of PulmHTN class I/VI (dx 2014) on continuous Treprostinil (Remodulin) (sildenafil - held initially) c/b Rt heart failure s/p PPM (Dual chamber 2016), Chronic P.E. on rivaroxaban (dx 2017), SVT s/p ablation (5/2020), Asthma (chronic steroid use - prednisone), Fe deficiency anemia who originally admitted to medicine floor  8/28/22 with c/o of palpitations accompanied by orthopnea/N/V/RUQ abd pain over a 2 day period.  This short hospital course c/b hypotension with SBP 80/41 accompanied by chest pain, transaminitis, EDOUARD with peak sCr 3.05 (now resolved, baseline sCr of 0.82) requiring transfer to MICU the evening of 8/28/22 for hypotension in setting of acute on chronic cor pulmonale/pulmHTN with end organ dysfunction (EDOUARD, transaminitis). Pt placed on dobutamine/diuretic/Marcela therapies. Course further c/b TTE (8/31/22) finding of new echogenic mass in RA concerning for clot in transit, CTA chest (8/31/22) re-demonstrated severe dilatation of Pulm arteries, without new P.E., received catheter - directed thrombus aspiration (see note interventional Cards 8/31/22).  Marcela weaned off 9/2. Left common femoral artery pseudoaneurysm was thrombin injected by vascular 9/3 was successful. Heparin gtt was resumed ON . Weaned off fio2 ; maintain 02sat > 94%  . C/w nasal moisturizer; no active bleeding . Tolerating increase sildenafil dose. Dobutamine gtt wean off 9/6 am . Discuss further plan of care with Dr. Yoon and Pulmonary Team. Transplant Team to re-evaluate patient 2/2 patients weight loss as was discussed between the transplant team and the patient prior to this admission.         Vital Signs Last 24 Hrs  T(C): 37.6 (07 Sep 2022 00:00), Max: 37.7 (06 Sep 2022 20:00)  T(F): 99.7 (07 Sep 2022 00:00), Max: 99.9 (06 Sep 2022 20:00)  HR: 85 (07 Sep 2022 01:00) (85 - 113)  BP: --  BP(mean): --  RR: 18 (07 Sep 2022 01:00) (15 - 36)  SpO2: 97% (07 Sep 2022 01:00) (92% - 100%)    Parameters below as of 06 Sep 2022 21:34  Patient On (Oxygen Delivery Method): room air      I&O's Summary    05 Sep 2022 07:01  -  06 Sep 2022 07:00  --------------------------------------------------------  IN: 1705.2 mL / OUT: 1530 mL / NET: 175.2 mL    06 Sep 2022 07:01  -  07 Sep 2022 01:49  --------------------------------------------------------  IN: 849.6 mL / OUT: 910 mL / NET: -60.4 mL      MEDICATIONS  (STANDING):  chlorhexidine 4% Liquid 1 Application(s) Topical <User Schedule>  cyanocobalamin 1000 MICROGram(s) Oral daily  heparin  Infusion 1250 Unit(s)/Hr (12.5 mL/Hr) IV Continuous <Continuous>  pantoprazole    Tablet 40 milliGRAM(s) Oral before breakfast  polyethylene glycol 3350 17 Gram(s) Oral every 12 hours  predniSONE   Tablet 10 milliGRAM(s) Oral daily  Remodulin (Treprostinil) 5mG/mL 1 Vial(s) 1 Vial(s) SubCutaneous Continuous Pump  senna 2 Tablet(s) Oral at bedtime  sildenafil (REVATIO) 20 milliGRAM(s) Oral <User Schedule>  spironolactone 25 milliGRAM(s) Oral daily    MEDICATIONS  (PRN):  acetaminophen     Tablet .. 650 milliGRAM(s) Oral every 6 hours PRN Mild Pain (1 - 3)  aluminum hydroxide/magnesium hydroxide/simethicone Suspension 30 milliLiter(s) Oral every 4 hours PRN Dyspepsia  artificial  tears Solution 1 Drop(s) Both EYES three times a day PRN Dry Eyes  HYDROmorphone  Injectable 0.5 milliGRAM(s) IV Push every 4 hours PRN Pain  ipratropium    for Nebulization 500 MICROGram(s) Nebulizer every 6 hours PRN Bronchospasm  melatonin 3 milliGRAM(s) Oral at bedtime PRN Insomnia  ondansetron Injectable 4 milliGRAM(s) IV Push every 8 hours PRN Nausea and/or Vomiting  sodium chloride 0.65% Nasal 1 Spray(s) Both Nostrils every 6 hours PRN Nasal Congestion  sodium chloride 0.9% lock flush 10 milliLiter(s) IV Push every 1 hour PRN Pre/post blood products, medications, blood draw, and to maintain line patency        LABS                                            11.6                  Neurophils% (auto):   x      (09-07 @ 00:21):    15.98)-----------(256          Lymphocytes% (auto):  x                                             37.2                   Eosinphils% (auto):   x        Manual%: Neutrophils x    ; Lymphocytes x    ; Eosinophils x    ; Bands%: x    ; Blasts x                                    133    |  98     |  9                   Calcium: 9.0   / iCa: x      (09-07 @ 00:21)    ----------------------------<  100       Magnesium: 1.9                              4.2     |  24     |  0.76             Phosphorous: 3.6      TPro  7.0    /  Alb  3.7    /  TBili  0.8    /  DBili  x      /  AST  27     /  ALT  103    /  AlkPhos  58     07 Sep 2022 00:21    ( 09-07 @ 00:21 )   PT: 12.4 sec;   INR: 1.08 ratio  aPTT: 58.9 sec      MICROBIOLOGY:  Culture - Sputum . (08.31.22 @ 20:45)    Gram Stain:   Moderate polymorphonuclear leukocytes per low power field  Rare Squamous epithelial cells per low power field  Moderate Gram positive cocci in pairs seen per oil power field  Few Gram Negative Rods seen per oil power field    Specimen Source: .Sputum Sputum    Culture Results:   Normal Respiratory Laura present    Culture - Blood (08.31.22 @ 00:12)    Specimen Source: .Blood Blood-Peripheral    Culture Results:   No growth to date.    Culture - Blood (08.31.22 @ 00:12)    Specimen Source: .Blood Blood-Peripheral    Culture Results:   No growth to date.      < from: US Duplex Arterial Lower Ext Ltd, Left (09.06.22 @ 15:15) >  ACC: [id number] EXAM:  US DPLX LWR EXT [employer] L[employer] LT                        PROCEDURE DATE:  09/06/2022    INTERPRETATION:  Clinical information: 42-year-old status post thrombin   injection for left groin pseudoaneurysm. Assess for recurrence.    Duplex and color flow Doppler evaluation of the left groin was performed   at the bedside.    A left groin hematoma is identified, measuring 3.3 x 1.4 x 1.6 cm. There   is no evidence of pseudoaneurysm.    The left common femoral artery and vein appear patent.    IMPRESSION: Left groin hematoma. No evidence of pseudoaneurysm.    < from: US Duplex Arterial Lower Ext Ltd, Left (09.04.22 @ 11:22) >    ACC: [id number] EXAM:  US DPLX LWR EXT [employer] LTD LT                        PROCEDURE DATE:  09/04/2022    INTERPRETATION:  History: A 3.4 cm left common femoral pseudoaneurysm was   identified on 9/2/2022, follow-up examination following thrombin   injection.  There is a 4 cm thrombosed hematoma.  A small residual nubbin communicates with the underlying left common   femoral artery.    Impression: Following thrombin injection the pseudoaneurysm is thrombosed.  There remains a small nubbin communicating with the left common femoral   artery.    < from: Transthoracic Echocardiogram (08.31.22 @ 16:57) >      Patient name: ROXI MARIA  YOB: 1980   Age: 42 (F)   MR#: [id number]  Study Date: 8/31/2022  Location: [place] [id number]: Ericka Moody RDCS  Study quality: Technically good  Referring Physician: Zackary Diamond MD  BloodPressure: 104/64 mmHg  Height: 165 cm  Weight: 104 kg  BSA: 2.1 m2  ------------------------------------------------------------------------  PROCEDURE: Transthoracic echocardiogram with 2-D, M-Mode  and complete spectral and color flow Doppler.  INDICATION: Localized swelling, mass and lump, trunk  (R22.2)  ------------------------------------------------------------------------  CONCLUSIONS:  Limited TTE in CATH  1. Overall preserved left ventricular systolic function.  2. Thrombus seen in RA.  3. Right ventricular enlargement with decreased right  ventricular systolic function.  4. No pericardial effusion seen.  Dr. CHRIS Robison Aware          ASSESSMENT & PLAN:     42 yr old female with PMHx of PulmHTN class I/VI (dx 2014) on continuous Treprostinil (Remodulin) via sq pump and restarted on sildenafil 2 days ago, +Rt heart failure s/p PPM (Dual chamber 2016), Chronic P.E. on rivaroxaban (dx 2017), SVT s/p ablation (5/2020), Asthma (chronic steroid use - prednisone), Fe deficiency anemia who originally admitted to medicine floor  8/28/22 for c/o palpitations on 8/28. Hospital course c/b hypotension, CP, transaminitis, and EDOUARD in the setting of cardiogenic shock, is s/p dobutamine gtt d/c 9/6 early am, aggressive diuresis,  and INO3 d/c'd 9/2. The pt hospital course had been further c/b a RA Clot found on 8/31 s/p a directed cath thrombus aspiration c/b a L common Femoral artery pseudomonas on 9/3- now resolved as noted on the Arterial duplex done on 9/6. The pt is stable for transfer to the floor, the transplant team will re-evaulate pt.  Dr. Yoon / Pulmmonary Team to be notified of any issues and changes.    Plan:    #Neuro: No neurological issues  -c/w Neuro checks  -increase activity as tolerated  -PT consult    #Pulm: Asthma, Cor pulmonale, PulmHTN (class I/VI), PE normally on Xarelto at home- on Heparin gtt here  -Supplemental O2 prn to maintain SPO2 > 92% (currently 2 liters N/C); able to wean off to RA ; o2sat >94%   -Ipratropium q 6 hrs and prn for sob/wheezes  -HOB >/= 30 degree angle  -pHTN specialist Dr. Yoon on case; input appreciated / Call Dr. Yoon / Pulmonary ([place]) for any issues / questions  -Marcela titrated off 9/2   -Sildenafil increased to 20 mg TID (9/5) - continue- pt tolerating medication well  -Dobutamine d/c'd @ 9_40 am 9/6; Vsat pre dub 75% ; and venous sat post dub 77%.   -continue with subq Treprostinil therapy with eventual plan for IV FLOLAN ; eval 9/6    -c/w aldactone 25 mg PO q d; goal net 500 ml negative   -consider obtaining I.R. consult for tunnel catheter placement  -c/w home dose  prednisone 10 mg PO q d      #CV: resolving acute on chronic cor pulmonale due to pulmHTN, echogenic mass in RA concerning for clot in transit (found to be organized old clot)  -dobutamine gtt d/c 9/6   -c/w aldactone home dose, goal  net negative   -TTE 8/31/22 - hyperdynamic LVSF, new echogenic mass in RA / clot s/p cath directed thrombus aspiration ib 9/3 c/b L common femoral artery pseudoaneurysm, 9/6 repeat duplex- found +hematoma w/o pseudoaneurysm f/u by vascular sx   -CTA chest 8/31/22  re-demonstrated severe dilatation of Pulm arteries, without new P.E.  -Duplex bilat L.E. 9/1/22 - No DVT  -Treprostinil therapy as above  -sildenafil as above  -home med of rivaroxaban 10 mg po qd - currently on hold   -c/w pt home dose of Remodulin - on Sq Pump   -Heparin gtt resumed 9/3 --> goal 60-99 PTT-    #GI/: resolved RUQ pain, improving transaminitis (most likely was due to congestive hepatopathy), resoled EDOUARD ( pre-renal)  -received RUQ U.S. 8/30/22 - CBD 7 mm, mild hepatic steatosis  -c/w bowel regimen ; last BM 9/5   -strict I & O's   -c/w aldactone home dose  and lasix therapy prn - f/u for guidance from Pulmonary Team / Pulmonary HTn Team - Dr. Yoon and Cardiology  -daily standing weight     #ID: resolved sepsis  -Sputum Cx 8/31/22 - mod PMN, rare squamous epi cells, mod gm+ cocci in pairs, few gm- rods  -Blood Culture 8/31/22 - NGTD  -Blood Culture 8/29/22 - NGTD  -MRSA/MSSA 8/29/22 - Detected  -Blood Cultrues 8/29/22 - Staph Lugdunensis s/p tx with aztreonam (8/29-9/1)  -RVP 8/28/22 - ND  -SARS-CoV-2 8/28/22 - ND   -continue Mupirocin 2% therapy q 12 hrs x5 days (started 8/30/22)    #FEN/ENDO/HEME: improved transaminitis, resolved hypercoagulation  -obtain CMP/Mg++/PO--4/CBC w diff/PT/PTT/INR  q 12 hrs   -trend LFT's; improving   -heparin gtt in progress ; PTT goal 60-99   -bleeding precaution   -CBC /PTT q 6 and PRN     PPX  GI ppx ; PPI    DVT ppx / PE / RA clot recent - on heparin gtt        For Followup:  -Restart Heparin gtt at 0500 9/7/2022- heparin gtt was held To d/c TLC- restart gtt at 1200/H- goal 60-99 PTT  -f/u with Dr. Yoon / [facility]   -f/u Cardiology  -f/u Vascular Surgery  -f/u transplant team

## 2022-09-07 NOTE — PROGRESS NOTE ADULT - ASSESSMENT
42 yr old female with stated hx significant for PulmHTN class I/VI on Treprostinil therapy, Chronic PE who originally presented with palpitations accompanied by orthopnea/N/V/RUQ abd pain over a 2 day period. Course c/b hypotension secondary to acute on chronic cor pulmonale with end organ dysfx (EDOUARD, transaminitis).           42 yr old female with PulmHTN class I/VI on Treprostinil therapy, Chronic PE, ILD, JULIA who originally presented with palpitations accompanied by orthopnea/N/V/RUQ abd pain over a 2 day period. Course c/b hypotension secondary to acute on chronic cor pulmonale with end organ dysfx (EDOUARD, transaminitis).

## 2022-09-07 NOTE — CONSULT NOTE ADULT - SUBJECTIVE AND OBJECTIVE BOX
Patient is a 42y old  Female who presents with a chief complaint of Palpitations (07 Sep 2022 07:55)    HPI:    42 yr old female with PMHx of PulmHTN class I/VI (dx 2014) on continuous Treprostinil (Remodulin) (sildenafil - held initially) c/b Rt heart failure s/p PPM (Dual chamber 2016), Chronic P.E. on rivaroxaban (dx 2017), SVT s/p ablation (5/2020), Asthma (chronic steroid use - prednisone), Fe deficiency anemia who originally admitted to medicine floor on 8/28/22 with c/o of palpitations accompanied by orthopnea/N/V/RUQ abd pain over a 2 day period.  This short hospital course c/b hypotension with SBP 80/41 accompanied by chest pain, transaminitis, EDOUARD with peak sCr 3.05 (now resolved, baseline sCr of 0.82) requiring transfer to MICU the evening of 8/28/22 for hypotension in setting of acute on chronic cor pulmonale/pulmHTN with end organ dysfunction (EDOUARD, transaminitis). Pt placed on dobutamine/diuretic/Marcela therapies. Course further c/b TTE (8/31/22) finding of new echogenic mass in RA concerning for clot in transit, CTA chest (8/31/22) re-demonstrated severe dilatation of Pulm arteries, without new P.E., received catheter - directed thrombus aspiration (see note interventional Cards 8/31/22).  Marcela weaned off 9/2. Left common femoral artery pseudoaneurysm was thrombin injected by vascular 9/3 was successful.    Now transferred to the floors, VSS , on RA, receiving heparin drip, sildenafil, remodulin pump, and spironolactone.     Patient has remained afebrile throughout hospital course. Had a WBC count of 10.83 on admission that has been up-trending with a high of 16.06 this AM. 1 of 2 vials from 8/29 grew staph lugdunensis. Repeat cultures from 8/31 had no growth. Patient otherwise had negative infectious workup. TTE from 8/31 showed right atrial enlargement and thrombus, poor RV systolic function and dilation but no e/o vegetations.              prior hospital charts reviewed [  ]  primary team notes reviewed [  ]  other consultant notes reviewed [  ]    PAST MEDICAL & SURGICAL HISTORY:  Tachycardia      Anemia      Pulmonary hypertension      Pulmonary embolism      Asthma  On home O2 nightly at 2L via NC      PE (pulmonary thromboembolism)  on Xarelto 10 mg daily      Sinusitis      Corneal disorder      Right heart failure      CAD (coronary artery disease)      H/O pulmonary hypertension      Pulmonary embolism      Asthma      History of tachycardia      On supplemental oxygen by nasal cannula  O2 @ 2L      Pacemaker      Skin mass  left upper thigh mass      History of tubal ligation      Pacemaker      H/O tubal ligation      History of pacemaker  12/19 - [employer] model Ingevity 4469        Allergies  adhesives (Rash)  penicillin (Rash)  penicillin (Unknown)  vancomycin (Rash)  vancomycin (Unknown)    ANTIMICROBIALS (past 90 days)  MEDICATIONS  (STANDING):    aztreonam  IVPB   100 mL/Hr IV Intermittent (09-01-22 @ 05:05)   100 mL/Hr IV Intermittent (08-31-22 @ 18:51)   100 mL/Hr IV Intermittent (08-31-22 @ 05:23)   100 mL/Hr IV Intermittent (08-30-22 @ 17:09)   100 mL/Hr IV Intermittent (08-30-22 @ 05:06)   100 mL/Hr IV Intermittent (08-29-22 @ 21:43)      ANTIMICROBIALS:      OTHER MEDS: MEDICATIONS  (STANDING):  acetaminophen     Tablet .. 650 every 6 hours PRN  aluminum hydroxide/magnesium hydroxide/simethicone Suspension 30 every 4 hours PRN  heparin  Infusion 1250 <Continuous>  HYDROmorphone  Injectable 0.5 every 4 hours PRN  ipratropium    for Nebulization 500 every 6 hours PRN  melatonin 3 at bedtime PRN  ondansetron Injectable 4 every 8 hours PRN  pantoprazole    Tablet 40 before breakfast  polyethylene glycol 3350 17 every 12 hours  predniSONE   Tablet 10 daily  senna 2 at bedtime  sildenafil (REVATIO) 20 <User Schedule>  spironolactone 25 daily    SOCIAL HISTORY:   Denies alcohol, tobacco or drug use (28 Aug 2022 15:36)      FAMILY HISTORY:  Family history of diabetes mellitus  in brother    Family history of diabetes mellitus in mother    FH: anemia      REVIEW OF SYSTEMS  [  ] ROS unobtainable because:    [  ] All other systems negative except as noted below:	    Constitutional:  [ ] fever [ ] chills  [ ] weight loss  [ ] weakness  Skin:  [ ] rash [ ] phlebitis	  Eyes: [ ] icterus [ ] pain  [ ] discharge	  ENMT: [ ] sore throat  [ ] thrush [ ] ulcers [ ] exudates  Respiratory: [ ] dyspnea [ ] hemoptysis [ ] cough [ ] sputum	  Cardiovascular:  [ ] chest pain [ ] palpitations [ ] edema	  Gastrointestinal:  [ ] nausea [ ] vomiting [ ] diarrhea [ ] constipation [ ] pain	  Genitourinary:  [ ] dysuria [ ] frequency [ ] hematuria [ ] discharge [ ] flank pain  [ ] incontinence  Musculoskeletal:  [ ] myalgias [ ] arthralgias [ ] arthritis  [ ] back pain  Neurological:  [ ] headache [ ] seizures  [ ] confusion/altered mental status  Psychiatric:  [ ] anxiety [ ] depression	  Hematology/Lymphatics:  [ ] lymphadenopathy  Endocrine:  [ ] adrenal [ ] thyroid  Allergic/Immunologic:	 [ ] transplant [ ] seasonal    Vital Signs Last 24 Hrs  T(F): 98.5 (09-07-22 @ 10:45), Max: 99.9 (09-06-22 @ 20:00)  Vital Signs Last 24 Hrs  HR: 98 (09-07-22 @ 10:45) (81 - 120)  BP: 101/70 (09-07-22 @ 10:45) (97/65 - 109/68)  RR: 18 (09-07-22 @ 10:45)  SpO2: 92% (09-07-22 @ 10:45) (92% - 100%)  Wt(kg): --    EXAM:  Constitutional: Not in acute distress  Eyes: pupils bilaterally reactive to light. No icterus.  Oral cavity: Clear, no lesions  Neck: No neck vein distension noted  RS: Chest clear to auscultation bilaterally. No wheeze/rhonchi/crepitations.  CVS: S1, S2 heard. Regular rate and rhythm. No murmurs/rubs/gallops.  Abdomen: Soft. No guarding/rigidity/tenderness.  : No acute abnormalities  Extremities: Warm. No pedal edema  Skin: No lesions noted  Vascular: No evidence of phlebitis  Neuro: Alert, oriented to time/place/person                          12.7   16.06 )-----------( 284      ( 07 Sep 2022 07:21 )             40.6     09-07    133<L>  |  98  |  9   ----------------------------<  100<H>  4.2   |  24  |  0.76    Ca    9.0      07 Sep 2022 00:21  Phos  3.6     09-07  Mg     1.9     09-07    TPro  7.0  /  Alb  3.7  /  TBili  0.8  /  DBili  x   /  AST  27  /  ALT  103<H>  /  AlkPhos  58  09-07    MICROBIOLOGY:                  RADIOLOGY:  imaging below personally reviewed    OTHER TESTS:   Patient is a 42y old  Female who presents with a chief complaint of Palpitations (07 Sep 2022 07:55)    HPI:    42 yr old female with PMHx of PulmHTN class I/VI (dx 2014) on continuous Treprostinil (Remodulin) (sildenafil - held initially) c/b Rt heart failure s/p PPM (Dual chamber 2016), Chronic P.E. on rivaroxaban (dx 2017), SVT s/p ablation (5/2020), Asthma (chronic steroid use - prednisone), Fe deficiency anemia who originally admitted to medicine floor on 8/28/22 with c/o of palpitations accompanied by orthopnea/N/V/RUQ abd pain over a 2 day period.  This short hospital course c/b hypotension with SBP 80/41 accompanied by chest pain, transaminitis, EDOUARD with peak sCr 3.05 (now resolved, baseline sCr of 0.82) requiring transfer to MICU the evening of 8/28/22 for hypotension in setting of acute on chronic cor pulmonale/pulmHTN with end organ dysfunction (EDOUARD, transaminitis). Pt placed on dobutamine/diuretic/Marcela therapies. Course further c/b TTE (8/31/22) finding of new echogenic mass in RA concerning for clot in transit, CTA chest (8/31/22) re-demonstrated severe dilatation of Pulm arteries, without new P.E., received catheter - directed thrombus aspiration.  Right central line placement on 9/2. Marcela weaned off 9/2. left radial A-line placed on 9/2. Left common femoral artery pseudoaneurysm was thrombin injected by vascular 9/3 was successful.    Now transferred to the floors, VSS ,on RA, receiving heparin drip, sildenafil, remodulin pump, and spironolactone.     Patient has remained afebrile throughout hospital course. Had a WBC count of 10.83 on admission that has been up-trending with a high of 16.06 this AM. 1 of 2 vials from 8/29 grew staph lugdunensis. Repeat cultures from 8/31 had no growth. Patient otherwise had negative infectious workup. Patient received course of aztreonam 2000mg BID from 8/29-9/1 for empiric coverage out of concern for sepsis contributing to hypotension i/s/o elevated white count. TTE from 8/31 showed right atrial enlargement and thrombus, poor RV systolic function and dilation but no e/o vegetations.      prior hospital charts reviewed [x]  primary team notes reviewed [x]  other consultant notes reviewed [x]    PAST MEDICAL & SURGICAL HISTORY:  Tachycardia      Anemia      Pulmonary hypertension      Pulmonary embolism      Asthma  On home O2 nightly at 2L via [place]      PE (pulmonary thromboembolism)  on Xarelto 10 mg daily      Sinusitis      Corneal disorder      Right heart failure      CAD (coronary artery disease)      H/O pulmonary hypertension      Pulmonary embolism      Asthma      History of tachycardia      On supplemental oxygen by nasal cannula  O2 @ 2L      Pacemaker      Skin mass  left upper thigh mass      History of tubal ligation      Pacemaker      H/O tubal ligation      History of pacemaker  12/19 - [employer] model Ingevity 4469        Allergies  adhesives (Rash)  penicillin (Rash)  penicillin (Unknown)  vancomycin (Rash)  vancomycin (Unknown)    ANTIMICROBIALS (past 90 days)  MEDICATIONS  (STANDING):    aztreonam  IVPB   100 mL/Hr IV Intermittent (09-01-22 @ 05:05)   100 mL/Hr IV Intermittent (08-31-22 @ 18:51)   100 mL/Hr IV Intermittent (08-31-22 @ 05:23)   100 mL/Hr IV Intermittent (08-30-22 @ 17:09)   100 mL/Hr IV Intermittent (08-30-22 @ 05:06)   100 mL/Hr IV Intermittent (08-29-22 @ 21:43)      ANTIMICROBIALS:      OTHER MEDS: MEDICATIONS  (STANDING):  acetaminophen     Tablet .. 650 every 6 hours PRN  aluminum hydroxide/magnesium hydroxide/simethicone Suspension 30 every 4 hours PRN  heparin  Infusion 1250 <Continuous>  HYDROmorphone  Injectable 0.5 every 4 hours PRN  ipratropium    for Nebulization 500 every 6 hours PRN  melatonin 3 at bedtime PRN  ondansetron Injectable 4 every 8 hours PRN  pantoprazole    Tablet 40 before breakfast  polyethylene glycol 3350 17 every 12 hours  predniSONE   Tablet 10 daily  senna 2 at bedtime  sildenafil (REVATIO) 20 <User Schedule>  spironolactone 25 daily    SOCIAL HISTORY:   Denies alcohol, tobacco or drug use (28 Aug 2022 15:36)      FAMILY HISTORY:  Family history of diabetes mellitus  in brother    Family history of diabetes mellitus in mother    FH: anemia      REVIEW OF SYSTEMS  [  ] ROS unobtainable because:    [x] All other systems negative except as noted below:	    Constitutional:  [ ] fever [ ] chills  [ ] weight loss  [ ] weakness  Skin:  [ ] rash [ ] phlebitis	  Eyes: [ ] icterus [ ] pain  [ ] discharge	  ENMT: [ ] sore throat  [ ] thrush [ ] ulcers [ ] exudates  Respiratory: [ ] dyspnea [ ] hemoptysis [ ] cough [ ] sputum	  Cardiovascular:  [ ] chest pain [ ] palpitations [ ] edema	  Gastrointestinal:  [ ] nausea [ ] vomiting [ ] diarrhea [ ] constipation [ ] pain	  Genitourinary:  [ ] dysuria [ ] frequency [ ] hematuria [ ] discharge [ ] flank pain  [ ] incontinence  Musculoskeletal:  [ ] myalgias [ ] arthralgias [ ] arthritis  [ ] back pain  Neurological:  [ ] headache [ ] seizures  [ ] confusion/altered mental status  Psychiatric:  [ ] anxiety [ ] depression	  Hematology/Lymphatics:  [ ] lymphadenopathy  Endocrine:  [ ] adrenal [ ] thyroid  Allergic/Immunologic:	 [ ] transplant [ ] seasonal    Vital Signs Last 24 Hrs  T(F): 98.5 (09-07-22 @ 10:45), Max: 99.9 (09-06-22 @ 20:00)  Vital Signs Last 24 Hrs  HR: 98 (09-07-22 @ 10:45) (81 - 120)  BP: 101/70 (09-07-22 @ 10:45) (97/65 - 109/68)  RR: 18 (09-07-22 @ 10:45)  SpO2: 92% (09-07-22 @ 10:45) (92% - 100%)  Wt(kg): --    EXAM:  Constitutional: Not in acute distress  Eyes: pupils bilaterally reactive to light. No icterus.  Oral cavity: Clear, no lesions  Neck: No neck vein distension noted  RS: Chest clear to auscultation bilaterally. No wheeze/rhonchi/crepitations.  CVS: S1, S2 heard. Regular rate and rhythm. No murmurs/rubs/gallops.  Abdomen: Soft. No guarding/rigidity/tenderness.  : No acute abnormalities  Extremities: Warm. No pedal edema  Skin: No lesions noted  Vascular: No evidence of phlebitis  Neuro: Alert, oriented to time/place/person                          12.7   16.06 )-----------( 284      ( 07 Sep 2022 07:21 )             40.6     09-07    133<L>  |  98  |  9   ----------------------------<  100<H>  4.2   |  24  |  0.76    Ca    9.0      07 Sep 2022 00:21  Phos  3.6     09-07  Mg     1.9     09-07    TPro  7.0  /  Alb  3.7  /  TBili  0.8  /  DBili  x   /  AST  27  /  ALT  103<H>  /  AlkPhos  58  09-07    MICROBIOLOGY:      Culture - Blood (08.29.22 @ 19:00)    -  Staphylococcus lugdunensis: Detec    Gram Stain:   Growth in aerobic bottle: Gram Positive Cocci in Clusters  Growth in anaerobic bottle: Gram Positive Cocci in Clusters    -  Ampicillin/Sulbactam: S <=8/4    -  Cefazolin: S <=4    -  Clindamycin: R <=0.25 This isolate is presumed to be clindamycin resistant based on detection of inducible resistance. Clindamycin may still be effective in some patients.    -  Erythromycin: R >4    -  Gentamicin: S <=1 Should not be used as monotherapy    -  Oxacillin: S 0.5    -  Penicillin: R 4    -  Rifampin: S <=1 Should not be used as monotherapy    -  Tetra/Doxy: S <=1    -  Trimethoprim/Sulfamethoxazole: S <=0.5/9.5    -  Vancomycin: S 0.5    Specimen Source: .Blood Blood-Peripheral    Organism: Blood Culture PCR    Organism: Staphylococcus lugdunensis    Culture Results:   Growth in aerobic and anaerobic bottles: Staphylococcus lugdunensis  ***Blood Panel PCR results on this specimen are available  approximately 3 hours after the Gram stain result.***  Gram stain, PCR, and/or culture results may not always  correspond due to difference in methodologies.  ************************************************************  This PCR assay was performed by multiplex PCR. This  Assay tests for 66 bacterial and resistance gene targets.  Please refer to the [facility] test directory  at [email or web address] for details.    Organism Identification: Blood Culture PCR  Staphylococcus lugdunensis    Method Type: PCR    Method Type: BRIAN        RADIOLOGY:  imaging below personally reviewed    CTA IMPRESSION:    No acute pulmonary embolus.    Severe dilatation of the pulmonary arteries representing pulmonary   hypertension is again noted. Bilateral groundglass opacities likely   manifestation of pulmonary hypertension are unchanged.    TTE from 8/31  CONCLUSIONS:  Limited TTE  1. Hyperdynamic left ventricular systolic function. LVEDD  3.2cm  2. A device wire is noted in the right heart. New echogenic  mass seen in the RA measuring 1.8cm x 3.1cm  (image 20-22)  this likley represents clot in transit. Not see on echo  from 8/29/2022  3. Right ventricular enlargement with decreased right  ventricular systolic function. A device wire is noted in  the right heart.  4. Estimated pulmonary artery systolic pressure equals 60  mm Hg, assuming right atrial pressure equals 8 mm Hg,  consistent with severe  pulmonary pressures.  Discussed with MICU team  and Dr. Paramjit Sanchez        OTHER TESTS:   Patient is a 42y old  Female who presents with a chief complaint of Palpitations (07 Sep 2022 07:55)    HPI:    42 yr old female with PMHx of PulmHTN class I/VI (dx 2014) on continuous Treprostinil (Remodulin) (sildenafil - held initially) c/b Rt heart failure s/p PPM (Dual chamber 2016), Chronic P.E. on rivaroxaban (dx 2017), SVT s/p ablation (5/2020), Asthma (chronic steroid use - prednisone), Fe deficiency anemia who originally admitted to medicine floor on 8/28/22 with c/o of palpitations accompanied by orthopnea/N/V/RUQ abd pain over a 2 day period.  This short hospital course c/b hypotension with SBP 80/41 accompanied by chest pain, transaminitis, EDOUARD with peak sCr 3.05 (now resolved, baseline sCr of 0.82) requiring transfer to MICU the evening of 8/28/22 for hypotension in setting of acute on chronic cor pulmonale/pulmHTN with end organ dysfunction (EDOUARD, transaminitis). Pt placed on dobutamine/diuretic/Marcela therapies. Course further c/b TTE (8/31/22) finding of new echogenic mass in RA concerning for clot in transit, CTA chest (8/31/22) re-demonstrated severe dilatation of Pulm arteries, without new P.E., received catheter - directed thrombus aspiration.  Right central line placement on 9/2. Marcela weaned off 9/2. left radial A-line placed on 9/2. Left common femoral artery pseudoaneurysm was thrombin injected by vascular 9/3 was successful.    Now transferred to the floors, VSS ,on RA, receiving heparin drip, sildenafil, remodulin pump, and spironolactone.     Patient has remained afebrile throughout hospital course. Had a WBC count of 10.83 on admission that has been up-trending with a high of 16.06 this AM. 1 of 2 vials from 8/29 grew staph lugdunensis. Repeat cultures from 8/31 had no growth. Patient otherwise had negative infectious workup. Patient received course of aztreonam 2000mg BID from 8/29-9/1 for empiric coverage out of concern for sepsis contributing to hypotension i/s/o elevated white count. TTE from 8/31 showed right atrial enlargement and thrombus, poor RV systolic function and dilation but no e/o vegetations.    Of note, patient endorses that she switched her Remodulin sub q pump site from the right lower quadrant of her abdomen to the left 3 days ago. She did this when she began to notice the site become "infected and tender."  Patient states that this happens frequently, and she has had to receive treatment with abx before for this problem.     prior hospital charts reviewed [x]  primary team notes reviewed [x]  other consultant notes reviewed [x]    PAST MEDICAL & SURGICAL HISTORY:  Tachycardia      Anemia      Pulmonary hypertension      Pulmonary embolism      Asthma  On home O2 nightly at 2L via [place]      PE (pulmonary thromboembolism)  on Xarelto 10 mg daily      Sinusitis      Corneal disorder      Right heart failure      CAD (coronary artery disease)      H/O pulmonary hypertension      Pulmonary embolism      Asthma      History of tachycardia      On supplemental oxygen by nasal cannula  O2 @ 2L      Pacemaker      Skin mass  left upper thigh mass      History of tubal ligation      Pacemaker      H/O tubal ligation      History of pacemaker  12/19 - [employer] model Ingevity 4469        Allergies  adhesives (Rash)  penicillin (Rash)  penicillin (Unknown)  vancomycin (Rash)  vancomycin (Unknown)    ANTIMICROBIALS (past 90 days)  MEDICATIONS  (STANDING):    aztreonam  IVPB   100 mL/Hr IV Intermittent (09-01-22 @ 05:05)   100 mL/Hr IV Intermittent (08-31-22 @ 18:51)   100 mL/Hr IV Intermittent (08-31-22 @ 05:23)   100 mL/Hr IV Intermittent (08-30-22 @ 17:09)   100 mL/Hr IV Intermittent (08-30-22 @ 05:06)   100 mL/Hr IV Intermittent (08-29-22 @ 21:43)      ANTIMICROBIALS:      OTHER MEDS: MEDICATIONS  (STANDING):  acetaminophen     Tablet .. 650 every 6 hours PRN  aluminum hydroxide/magnesium hydroxide/simethicone Suspension 30 every 4 hours PRN  heparin  Infusion 1250 <Continuous>  HYDROmorphone  Injectable 0.5 every 4 hours PRN  ipratropium    for Nebulization 500 every 6 hours PRN  melatonin 3 at bedtime PRN  ondansetron Injectable 4 every 8 hours PRN  pantoprazole    Tablet 40 before breakfast  polyethylene glycol 3350 17 every 12 hours  predniSONE   Tablet 10 daily  senna 2 at bedtime  sildenafil (REVATIO) 20 <User Schedule>  spironolactone 25 daily    SOCIAL HISTORY:   Denies alcohol, tobacco or drug use (28 Aug 2022 15:36)      FAMILY HISTORY:  Family history of diabetes mellitus  in brother    Family history of diabetes mellitus in mother    FH: anemia      REVIEW OF SYSTEMS  [  ] ROS unobtainable because:    [x] All other systems negative except as noted below:	    Constitutional:  [ ] fever [ ] chills  [ ] weight loss  [ ] weakness  Skin:  [x rash [ ] phlebitis	  Eyes: [ ] icterus [ ] pain  [ ] discharge	  ENMT: [ ] sore throat  [ ] thrush [ ] ulcers [ ] exudates  Respiratory: [ ] dyspnea [ ] hemoptysis [ ] cough [ ] sputum	  Cardiovascular:  [ ] chest pain [ ] palpitations [ ] edema	  Gastrointestinal:  [ ] nausea [ ] vomiting [ ] diarrhea [ ] constipation [ ] pain	  Genitourinary:  [ ] dysuria [ ] frequency [ ] hematuria [ ] discharge [ ] flank pain  [ ] incontinence  Musculoskeletal:  [ ] myalgias [ ] arthralgias [ ] arthritis  [ ] back pain  Neurological:  [ ] headache [ ] seizures  [ ] confusion/altered mental status  Psychiatric:  [ ] anxiety [ ] depression	  Hematology/Lymphatics:  [ ] lymphadenopathy  Endocrine:  [ ] adrenal [ ] thyroid  Allergic/Immunologic:	 [ ] transplant [ ] seasonal    Vital Signs Last 24 Hrs  T(F): 98.5 (09-07-22 @ 10:45), Max: 99.9 (09-06-22 @ 20:00)  Vital Signs Last 24 Hrs  HR: 98 (09-07-22 @ 10:45) (81 - 120)  BP: 101/70 (09-07-22 @ 10:45) (97/65 - 109/68)  RR: 18 (09-07-22 @ 10:45)  SpO2: 92% (09-07-22 @ 10:45) (92% - 100%)  Wt(kg): --    EXAM:  Constitutional: Not in acute distress  Eyes: pupils bilaterally reactive to light. No icterus.  Oral cavity: Clear, no lesions  Neck: No neck vein distension noted  RS: Chest clear to auscultation bilaterally. No wheeze/rhonchi/crepitations.  CVS: S1, S2 heard. Regular rate and rhythm. No murmurs/rubs/gallops.  Abdomen: Soft. No guarding/rigidity/tenderness.  : No acute abnormalities  Extremities: Warm. No pedal edema  Skin: Patient with pigmentary changes, edema, and tenderness around previous site of Remodulin pump site in RLQ  Vascular: No evidence of phlebitis  Neuro: Alert, oriented to time/place/person                          12.7   16.06 )-----------( 284      ( 07 Sep 2022 07:21 )             40.6     09-07    133<L>  |  98  |  9   ----------------------------<  100<H>  4.2   |  24  |  0.76    Ca    9.0      07 Sep 2022 00:21  Phos  3.6     09-07  Mg     1.9     09-07    TPro  7.0  /  Alb  3.7  /  TBili  0.8  /  DBili  x   /  AST  27  /  ALT  103<H>  /  AlkPhos  58  09-07    MICROBIOLOGY:      Culture - Blood (08.29.22 @ 19:00)    -  Staphylococcus lugdunensis: Detec    Gram Stain:   Growth in aerobic bottle: Gram Positive Cocci in Clusters  Growth in anaerobic bottle: Gram Positive Cocci in Clusters    -  Ampicillin/Sulbactam: S <=8/4    -  Cefazolin: S <=4    -  Clindamycin: R <=0.25 This isolate is presumed to be clindamycin resistant based on detection of inducible resistance. Clindamycin may still be effective in some patients.    -  Erythromycin: R >4    -  Gentamicin: S <=1 Should not be used as monotherapy    -  Oxacillin: S 0.5    -  Penicillin: R 4    -  Rifampin: S <=1 Should not be used as monotherapy    -  Tetra/Doxy: S <=1    -  Trimethoprim/Sulfamethoxazole: S <=0.5/9.5    -  Vancomycin: S 0.5    Specimen Source: .Blood Blood-Peripheral    Organism: Blood Culture PCR    Organism: Staphylococcus lugdunensis    Culture Results:   Growth in aerobic and anaerobic bottles: Staphylococcus lugdunensis  ***Blood Panel PCR results on this specimen are available  approximately 3 hours after the Gram stain result.***  Gram stain, PCR, and/or culture results may not always  correspond due to difference in methodologies.  ************************************************************  This PCR assay was performed by multiplex PCR. This  Assay tests for 66 bacterial and resistance gene targets.  Please refer to the [facility] test directory  at [email or web address] for details.    Organism Identification: Blood Culture PCR  Staphylococcus lugdunensis    Method Type: PCR    Method Type: BRIAN        RADIOLOGY:  imaging below personally reviewed    CTA IMPRESSION:    No acute pulmonary embolus.    Severe dilatation of the pulmonary arteries representing pulmonary   hypertension is again noted. Bilateral groundglass opacities likely   manifestation of pulmonary hypertension are unchanged.    TTE from 8/31  CONCLUSIONS:  Limited TTE  1. Hyperdynamic left ventricular systolic function. LVEDD  3.2cm  2. A device wire is noted in the right heart. New echogenic  mass seen in the RA measuring 1.8cm x 3.1cm  (image 20-22)  this likley represents clot in transit. Not see on echo  from 8/29/2022  3. Right ventricular enlargement with decreased right  ventricular systolic function. A device wire is noted in  the right heart.  4. Estimated pulmonary artery systolic pressure equals 60  mm Hg, assuming right atrial pressure equals 8 mm Hg,  consistent with severe  pulmonary pressures.  Discussed with MICU team  and Dr. Paramjit Sanchez        OTHER TESTS:   Patient is a 42y old  Female who presents with a chief complaint of Palpitations (07 Sep 2022 07:55)    HPI:    42 yr old female with PMHx of PulmHTN class I/VI (dx 2014) on continuous Treprostinil (Remodulin) (sildenafil - held initially) c/b Rt heart failure s/p PPM (Dual chamber 2016), Chronic P.E. on rivaroxaban (dx 2017), SVT s/p ablation (5/2020), Asthma (chronic steroid use - prednisone), Fe deficiency anemia who originally admitted to medicine floor on 8/28/22 with c/o of palpitations accompanied by orthopnea/N/V/RUQ abd pain over a 2 day period.  This short hospital course c/b hypotension with SBP 80/41 accompanied by chest pain, transaminitis, EDOUARD with peak sCr 3.05 (now resolved, baseline sCr of 0.82) requiring transfer to MICU the evening of 8/28/22 for hypotension in setting of acute on chronic cor pulmonale/pulmHTN with end organ dysfunction (EDOUARD, transaminitis). Pt placed on dobutamine/diuretic/Marcela therapies. Course further c/b TTE (8/31/22) finding of new echogenic mass in RA concerning for clot in transit, CTA chest (8/31/22) re-demonstrated severe dilatation of Pulm arteries, without new P.E., received catheter - directed thrombus aspiration.  Right central line placement on 9/2. Marcela weaned off 9/2. left radial A-line placed on 9/2. Left common femoral artery pseudoaneurysm was thrombin injected by vascular 9/3 was successful.    Now transferred to the floors, VSS ,on RA, receiving heparin drip, sildenafil, remodulin pump, and spironolactone.     Patient has remained afebrile throughout hospital course. Had a WBC count of 10.83 on admission that has been up-trending with a high of 16.06 this AM. 1 of 2 vials from 8/29 grew staph lugdunensis. Repeat cultures from 8/31 had no growth. Patient otherwise had negative infectious workup. Patient received course of aztreonam 2000mg BID from 8/29-9/1 for empiric coverage out of concern for sepsis contributing to hypotension i/s/o elevated white count. TTE from 8/31 showed right atrial enlargement and thrombus, poor RV systolic function and dilation but no e/o vegetations.    Of note, patient endorses that she switched her Remodulin sub q pump site from the right lower quadrant of her abdomen to the left 3 days ago. She did this when she began to notice the site become "infected and tender."  Patient states that this happens frequently, and she has had to receive treatment with abx before for this problem.     prior hospital charts reviewed [x]  primary team notes reviewed [x]  other consultant notes reviewed [x]    PAST MEDICAL & SURGICAL HISTORY:  Tachycardia      Anemia      Pulmonary hypertension      Pulmonary embolism      Asthma  On home O2 nightly at 2L via NC      PE (pulmonary thromboembolism)  on Xarelto 10 mg daily      Sinusitis      Corneal disorder      Right heart failure      CAD (coronary artery disease)      H/O pulmonary hypertension      Pulmonary embolism      Asthma      History of tachycardia      On supplemental oxygen by nasal cannula  O2 @ 2L      Pacemaker      Skin mass  left upper thigh mass      History of tubal ligation      Pacemaker      H/O tubal ligation      History of pacemaker  12/19 - [employer] model Ingevity 4469        Allergies  adhesives (Rash)  penicillin (Rash)  penicillin (Unknown)  vancomycin (Rash)  vancomycin (Unknown)    ANTIMICROBIALS (past 90 days)  MEDICATIONS  (STANDING):    aztreonam  IVPB   100 mL/Hr IV Intermittent (09-01-22 @ 05:05)   100 mL/Hr IV Intermittent (08-31-22 @ 18:51)   100 mL/Hr IV Intermittent (08-31-22 @ 05:23)   100 mL/Hr IV Intermittent (08-30-22 @ 17:09)   100 mL/Hr IV Intermittent (08-30-22 @ 05:06)   100 mL/Hr IV Intermittent (08-29-22 @ 21:43)      ANTIMICROBIALS:      OTHER MEDS: MEDICATIONS  (STANDING):  acetaminophen     Tablet .. 650 every 6 hours PRN  aluminum hydroxide/magnesium hydroxide/simethicone Suspension 30 every 4 hours PRN  heparin  Infusion 1250 <Continuous>  HYDROmorphone  Injectable 0.5 every 4 hours PRN  ipratropium    for Nebulization 500 every 6 hours PRN  melatonin 3 at bedtime PRN  ondansetron Injectable 4 every 8 hours PRN  pantoprazole    Tablet 40 before breakfast  polyethylene glycol 3350 17 every 12 hours  predniSONE   Tablet 10 daily  senna 2 at bedtime  sildenafil (REVATIO) 20 <User Schedule>  spironolactone 25 daily    SOCIAL HISTORY:   Denies alcohol, tobacco or drug use (28 Aug 2022 15:36)      FAMILY HISTORY:  Family history of diabetes mellitus  in brother    Family history of diabetes mellitus in mother    FH: anemia      REVIEW OF SYSTEMS  [  ] ROS unobtainable because:    [x] All other systems negative except as noted below:	    Constitutional:  [ ] fever [ ] chills  [ ] weight loss  [ ] weakness  Skin:  [x rash [ ] phlebitis	  Eyes: [ ] icterus [ ] pain  [ ] discharge	  ENMT: [ ] sore throat  [ ] thrush [ ] ulcers [ ] exudates  Respiratory: [ ] dyspnea [ ] hemoptysis [ ] cough [ ] sputum	  Cardiovascular:  [ ] chest pain [ ] palpitations [ ] edema	  Gastrointestinal:  [ ] nausea [ ] vomiting [ ] diarrhea [ ] constipation [ ] pain	  Genitourinary:  [ ] dysuria [ ] frequency [ ] hematuria [ ] discharge [ ] flank pain  [ ] incontinence  Musculoskeletal:  [ ] myalgias [ ] arthralgias [ ] arthritis  [ ] back pain  Neurological:  [ ] headache [ ] seizures  [ ] confusion/altered mental status  Psychiatric:  [ ] anxiety [ ] depression	  Hematology/Lymphatics:  [ ] lymphadenopathy  Endocrine:  [ ] adrenal [ ] thyroid  Allergic/Immunologic:	 [ ] transplant [ ] seasonal    Vital Signs Last 24 Hrs  T(F): 98.5 (09-07-22 @ 10:45), Max: 99.9 (09-06-22 @ 20:00)  Vital Signs Last 24 Hrs  HR: 98 (09-07-22 @ 10:45) (81 - 120)  BP: 101/70 (09-07-22 @ 10:45) (97/65 - 109/68)  RR: 18 (09-07-22 @ 10:45)  SpO2: 92% (09-07-22 @ 10:45) (92% - 100%)  Wt(kg): --    EXAM:  Constitutional: Not in acute distress  Eyes: pupils bilaterally reactive to light. No icterus.  Oral cavity: Clear, no lesions  Neck: No neck vein distension noted  RS: Chest clear to auscultation bilaterally. No wheeze/rhonchi/crepitations.  CVS: S1, S2 heard. Regular rate and rhythm. No murmurs/rubs/gallops.  Abdomen: Soft. No guarding/rigidity/tenderness.  : No acute abnormalities  Extremities: Warm. No pedal edema  Skin: Patient with pigmentary changes, edema, and tenderness around previous site of Remodulin pump site in RLQ  Vascular: No evidence of phlebitis  Neuro: Alert, oriented to time/place/person                          12.7   16.06 )-----------( 284      ( 07 Sep 2022 07:21 )             40.6     09-07    133<L>  |  98  |  9   ----------------------------<  100<H>  4.2   |  24  |  0.76    Ca    9.0      07 Sep 2022 00:21  Phos  3.6     09-07  Mg     1.9     09-07    TPro  7.0  /  Alb  3.7  /  TBili  0.8  /  DBili  x   /  AST  27  /  ALT  103<H>  /  AlkPhos  58  09-07    MICROBIOLOGY:      Culture - Blood (08.29.22 @ 19:00)    -  Staphylococcus lugdunensis: Detec    Gram Stain:   Growth in aerobic bottle: Gram Positive Cocci in Clusters  Growth in anaerobic bottle: Gram Positive Cocci in Clusters    -  Ampicillin/Sulbactam: S <=8/4    -  Cefazolin: S <=4    -  Clindamycin: R <=0.25 This isolate is presumed to be clindamycin resistant based on detection of inducible resistance. Clindamycin may still be effective in some patients.    -  Erythromycin: R >4    -  Gentamicin: S <=1 Should not be used as monotherapy    -  Oxacillin: S 0.5    -  Penicillin: R 4    -  Rifampin: S <=1 Should not be used as monotherapy    -  Tetra/Doxy: S <=1    -  Trimethoprim/Sulfamethoxazole: S <=0.5/9.5    -  Vancomycin: S 0.5    Specimen Source: .Blood Blood-Peripheral    Organism: Blood Culture PCR    Organism: Staphylococcus lugdunensis    Culture Results:   Growth in aerobic and anaerobic bottles: Staphylococcus lugdunensis  ***Blood Panel PCR results on this specimen are available  approximately 3 hours after the Gram stain result.***  Gram stain, PCR, and/or culture results may not always  correspond due to difference in methodologies.  ************************************************************  This PCR assay was performed by multiplex PCR. This  Assay tests for 66 bacterial and resistance gene targets.  Please refer to the [facility] test directory  at [email or web address] for details.    Organism Identification: Blood Culture PCR  Staphylococcus lugdunensis    Method Type: PCR    Method Type: BRIAN        RADIOLOGY:  imaging below personally reviewed    CTA IMPRESSION:    No acute pulmonary embolus.    Severe dilatation of the pulmonary arteries representing pulmonary   hypertension is again noted. Bilateral groundglass opacities likely   manifestation of pulmonary hypertension are unchanged.    TTE from 8/31  CONCLUSIONS:  Limited TTE  1. Hyperdynamic left ventricular systolic function. LVEDD  3.2cm  2. A device wire is noted in the right heart. New echogenic  mass seen in the RA measuring 1.8cm x 3.1cm  (image 20-22)  this likley represents clot in transit. Not see on echo  from 8/29/2022  3. Right ventricular enlargement with decreased right  ventricular systolic function. A device wire is noted in  the right heart.  4. Estimated pulmonary artery systolic pressure equals 60  mm Hg, assuming right atrial pressure equals 8 mm Hg,  consistent with severe  pulmonary pressures.  Discussed with MICU team  and Dr. Paramjit Sanchez        OTHER TESTS:   Patient is a 42y old  Female who presents with a chief complaint of Palpitations (07 Sep 2022 07:55)    HPI:    42 yr old female with PMHx of PulmHTN class I/VI (dx 2014) on continuous Treprostinil (Remodulin) (sildenafil - held initially) c/b Rt heart failure s/p PPM (Dual chamber 2016), Chronic P.E. on rivaroxaban (dx 2017), SVT s/p ablation (5/2020), Asthma (chronic steroid use - prednisone), Fe deficiency anemia who originally admitted to medicine floor on 8/28/22 with c/o of palpitations accompanied by orthopnea/N/V/RUQ abd pain over a 2 day period.  This short hospital course c/b hypotension with SBP 80/41 accompanied by chest pain, transaminitis, EDOUARD with peak sCr 3.05 (now resolved, baseline sCr of 0.82) requiring transfer to MICU the evening of 8/28/22 for hypotension in setting of acute on chronic cor pulmonale/pulmHTN with end organ dysfunction (EDOUARD, transaminitis). Pt placed on dobutamine/diuretic/Marcela therapies. Course further c/b TTE (8/31/22) finding of new echogenic mass in RA concerning for clot in transit, CTA chest (8/31/22) re-demonstrated severe dilatation of Pulm arteries, without new P.E., received catheter - directed thrombus aspiration.  Right central line placement on 9/2. Marcela weaned off 9/2. left radial A-line placed on 9/2. Left common femoral artery pseudoaneurysm was thrombin injected by vascular 9/3 was successful.    Now transferred to the floors, VSS ,on RA, receiving heparin drip, sildenafil, remodulin pump, and spironolactone.     Patient has remained afebrile throughout hospital course. Had a WBC count of 10.83 on admission that has been up-trending with a high of 16.06 this AM. 1 of 2 vials from 8/29 grew staph lugdunensis. Repeat cultures from 8/31 had no growth. Patient otherwise had negative infectious workup. Patient received course of aztreonam 2000mg BID from 8/29-9/1 for empiric coverage out of concern for sepsis contributing to hypotension i/s/o elevated white count. TTE from 8/31 showed right atrial enlargement and thrombus, poor RV systolic function and dilation but no e/o vegetations.    Of note, patient endorses that she switched her Remodulin sub q pump site from the right lower quadrant of her abdomen to the left 3 days ago. She did this when she began to notice the site become "infected and tender."  Patient states that this happens frequently, and she has had to receive treatment with abx before for this problem.     prior hospital charts reviewed [x]  primary team notes reviewed [x]  other consultant notes reviewed [x]    PAST MEDICAL & SURGICAL HISTORY:  Tachycardia      Anemia      Pulmonary hypertension      Pulmonary embolism      Asthma  On home O2 nightly at 2L via NC      PE (pulmonary thromboembolism)  on Xarelto 10 mg daily      Sinusitis      Corneal disorder      Right heart failure      CAD (coronary artery disease)      H/O pulmonary hypertension      Pulmonary embolism      Asthma      History of tachycardia      On supplemental oxygen by nasal cannula  O2 @ 2L      Pacemaker      Skin mass  left upper thigh mass      History of tubal ligation      Pacemaker      H/O tubal ligation      History of pacemaker  12/19 - [employer] model Ingevity 4469        Allergies  adhesives (Rash)  penicillin (Rash)  penicillin (Unknown)  vancomycin (Rash)  vancomycin (Unknown)    ANTIMICROBIALS (past 90 days)  MEDICATIONS  (STANDING):    aztreonam  IVPB   100 mL/Hr IV Intermittent (09-01-22 @ 05:05)   100 mL/Hr IV Intermittent (08-31-22 @ 18:51)   100 mL/Hr IV Intermittent (08-31-22 @ 05:23)   100 mL/Hr IV Intermittent (08-30-22 @ 17:09)   100 mL/Hr IV Intermittent (08-30-22 @ 05:06)   100 mL/Hr IV Intermittent (08-29-22 @ 21:43)      ANTIMICROBIALS:      OTHER MEDS: MEDICATIONS  (STANDING):  acetaminophen     Tablet .. 650 every 6 hours PRN  aluminum hydroxide/magnesium hydroxide/simethicone Suspension 30 every 4 hours PRN  heparin  Infusion 1250 <Continuous>  HYDROmorphone  Injectable 0.5 every 4 hours PRN  ipratropium    for Nebulization 500 every 6 hours PRN  melatonin 3 at bedtime PRN  ondansetron Injectable 4 every 8 hours PRN  pantoprazole    Tablet 40 before breakfast  polyethylene glycol 3350 17 every 12 hours  predniSONE   Tablet 10 daily  senna 2 at bedtime  sildenafil (REVATIO) 20 <User Schedule>  spironolactone 25 daily        SOCIAL HISTORY:   Denies smoking, lives with family.         FAMILY HISTORY:  Family history of diabetes mellitus  in brother    Family history of diabetes mellitus in mother    FH: anemia        REVIEW OF SYSTEMS  [  ] ROS unobtainable because:    [x] All other systems negative except as noted below:	    Constitutional:  [ ] fever [ ] chills   Skin:  [x rash [ ] phlebitis	  Eyes: [ ] icterus [ ] pain  [ ] discharge	  ENMT: [ ] sore throat  [ ] thrush  Respiratory: [ ] dyspnea  [ ] cough [ ] sputum	  Cardiovascular:  [ ] chest pain [ ] palpitations	  Gastrointestinal:  [ ] nausea [ ] vomiting [ ] diarrhea [ ] constipation [ ] pain	  Genitourinary:  [ ] dysuria [ ] frequency   Musculoskeletal:  [ ] arthritis  [ ] back pain  Neurological:  [ ] headache [ ] seizures  [ ] confusion/altered mental status  Psychiatric:  [ ] anxiety [ ] depression	  Endocrine:  [ ] adrenal [ ] thyroid  Allergic/Immunologic:	 [ ] transplant [ ] seasonal        Vital Signs Last 24 Hrs  T(F): 98.5 (09-07-22 @ 10:45), Max: 99.9 (09-06-22 @ 20:00)  Vital Signs Last 24 Hrs  HR: 98 (09-07-22 @ 10:45) (81 - 120)  BP: 101/70 (09-07-22 @ 10:45) (97/65 - 109/68)  RR: 18 (09-07-22 @ 10:45)  SpO2: 92% (09-07-22 @ 10:45) (92% - 100%)  Wt(kg): --      EXAM:  Constitutional: Not in acute distress  Eyes: No icterus.  Oral cavity: Clear, no lesions  Neck: supple   RS: + air entry bilaterally.   CVS: S1, S2 heard. Regular rate and rhythm. + PPM   Abdomen: Soft. + tenderness at the site of old pump, + fluctuance   : No acute abnormalities  Extremities: Warm. No pedal edema  Skin: Patient with pigmentary changes, edema, and tenderness around previous site of Remodulin pump site in RLQ, induration lt groin   Vascular: No evidence of phlebitis  Neuro: Alert, oriented to time/place/person                            12.7   16.06 )-----------( 284      ( 07 Sep 2022 07:21 )             40.6     09-07    133<L>  |  98  |  9   ----------------------------<  100<H>  4.2   |  24  |  0.76    Ca    9.0      07 Sep 2022 00:21  Phos  3.6     09-07  Mg     1.9     09-07    TPro  7.0  /  Alb  3.7  /  TBili  0.8  /  DBili  x   /  AST  27  /  ALT  103<H>  /  AlkPhos  58  09-07    MICROBIOLOGY:      Culture - Blood (08.29.22 @ 19:00)    -  Staphylococcus lugdunensis: Detec    Gram Stain:   Growth in aerobic bottle: Gram Positive Cocci in Clusters  Growth in anaerobic bottle: Gram Positive Cocci in Clusters    -  Ampicillin/Sulbactam: S <=8/4    -  Cefazolin: S <=4    -  Clindamycin: R <=0.25 This isolate is presumed to be clindamycin resistant based on detection of inducible resistance. Clindamycin may still be effective in some patients.    -  Erythromycin: R >4    -  Gentamicin: S <=1 Should not be used as monotherapy    -  Oxacillin: S 0.5    -  Penicillin: R 4    -  Rifampin: S <=1 Should not be used as monotherapy    -  Tetra/Doxy: S <=1    -  Trimethoprim/Sulfamethoxazole: S <=0.5/9.5    -  Vancomycin: S 0.5    Specimen Source: .Blood Blood-Peripheral    Organism: Blood Culture PCR    Organism: Staphylococcus lugdunensis    Culture Results:   Growth in aerobic and anaerobic bottles: Staphylococcus lugdunensis  ***Blood Panel PCR results on this specimen are available  approximately 3 hours after the Gram stain result.***  Gram stain, PCR, and/or culture results may not always  correspond due to difference in methodologies.  ************************************************************  This PCR assay was performed by multiplex PCR. This  Assay tests for 66 bacterial and resistance gene targets.  Please refer to the [facility] test directory  at [email or web address] for details.    Organism Identification: Blood Culture PCR  Staphylococcus lugdunensis    Method Type: PCR    Method Type: BRIAN        RADIOLOGY:  imaging below personally reviewed    CTA IMPRESSION:    No acute pulmonary embolus.    Severe dilatation of the pulmonary arteries representing pulmonary   hypertension is again noted. Bilateral groundglass opacities likely   manifestation of pulmonary hypertension are unchanged.    TTE from 8/31  CONCLUSIONS:  Limited TTE  1. Hyperdynamic left ventricular systolic function. LVEDD  3.2cm  2. A device wire is noted in the right heart. New echogenic  mass seen in the RA measuring 1.8cm x 3.1cm  (image 20-22)  this likley represents clot in transit. Not see on echo  from 8/29/2022  3. Right ventricular enlargement with decreased right  ventricular systolic function. A device wire is noted in  the right heart.  4. Estimated pulmonary artery systolic pressure equals 60  mm Hg, assuming right atrial pressure equals 8 mm Hg,  consistent with severe  pulmonary pressures.

## 2022-09-07 NOTE — PROGRESS NOTE ADULT - CRITICAL CARE ATTENDING COMMENT
42 F with Group I/?IV pulm htn on Remodulin pump, PE on xarelto here with sob and palpitations, found to have acute on chronic cor pulmonale and acute on chronic hypoxic respiratory failure with shock    Had episode of hypotension yesterday evening that resolved with pressors/inotropes  clinically improved today overall, more alert, less pain and dyspnea    - c/w dobutamine for now at 2.5 given tachycardia  - c/w Marcela at 10 ppm via NC for now 4 L  - EDOUARD improving; will aim to keep net negative 1 liter today as IVC size improving on POCUS and she may have auto-diuresis as BP improved  - c/w remodulin pump at 42 ng/kg/min; given overall improvement in condition, can likely hold off on IV remodulin for now but will d/w Dr. Yoon and HF  - empiric abx pending cultures  - INR likely related to congestive hepatopathy, monitor off eliquis for now
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 EDOUARD likely secondary to venous congestion.     - D/C dobutamine  - Continue sildenafil  - Possible initiation of flolan  - EDOUARD resolved  - Continue heparin gtt
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 EDOUARD likely secondary to venous congestion.     - Will dc dobutamine in am if tolerates increased dose of sildenafil  - Will obtain pre and post central venous sat prior to stopping   - Increased sildenafil to 20 TID if tolerated.  - Possible initiation of flolan  - EDOUARD resolved  - Continue heparin gtt.    D/w heart failure and pulmonary hypertension teams
42 F with Group I/?IV pulm htn on Remodulin pump, PE on xarelto here with sob and palpitations, found to have acute on chronic cor pulmonale and acute on chronic hypoxic respiratory failure with shock, concern for clot in transit with attempted removal but stopped due to chronic appearance    continues to improve overall, headache slightly better.  Abd pain improving.    - c/w dobutamine for now at 2.5  - plan to turn off Marcela today and start sildenafil 10 mg x 1 to see response; if she tolerates it, will start standing sildenafil but if not will put back Marcela  - EDOUARD improving; goal to keep her net negative 500 cc as she still feels some fullness; diuresis prn  - c/w remodulin pump at 42 ng/kg/min; IV remodulin is not available; will try to transition to flolan Marcela is off and she is stable on sildenafil  - L groni pain at site of procedure/ central line, will get duplex to r/o fistula  - will need R IJ for flolan  - c/w heparin gtt for PEs    Critically ill patient requiring frequent bedside visits with therapy changes.
42 F with Group I/?IV pulm htn on Remodulin pump, PE on xarelto here with sob and palpitations, found to have acute on chronic cor pulmonale and acute on chronic hypoxic respiratory failure with shock, concern for clot in transit with attempted removal but stopped due to chronic appearance continues to improve overall, headache slightly better.  Abd pain improving.    - c/w dobutamine for now at 2.5. Will have to reduce over the next few days.   - Titrated off inhaled nitric. Started sildenafil increased to 10mg yesterday, now increase to 20mg.   - Hold off diuresis today given tachycardia.   - c/w remodulin pump at 42 ng/kg/min; Ongoing discussion about flolan  - Duplex demonstrating pseudoaneurysm, injected with thrombin yesterday, repeat doppler today.  - Heparin restarted today after cleared by vascular surgery    Critically ill patient requiring frequent bedside visits with therapy changes.
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 EDOUARD likely secondary to venous congestion- resolved.     Now transferred out of the ICU.     Staph lugdunensis bacteremia- repeat cx, start Ancef per ID input. Subcutaneous abscess secondary to device- surgery to evaluate.

## 2022-09-07 NOTE — CONSULT NOTE ADULT - SUBJECTIVE AND OBJECTIVE BOX
Lung Transplant Progress Note  =====================================================  HPI:  42F PMH PE on xarelto, pHTN, ILD, JULIA, right sided heart failure, presents to the hospital for 2 days of palpitations. Patient reports she has had similar episodes of palpitations in the past, however workup in the past was unremarkable. Patient also reporting mild orthopnea. Denies chest pain. Denies diaphoresis. Denies recent use of stimulants including caffeine. Patient also reporting severe nausea and vomiting which has resolved with zofran in the ED. ROS otherwise unremarkable.    ED course: VS with tachycardia. CBC with anemia. CMP normal. BNP 7646. Troponin T normal. EKG with sinus tachycardia, CXR without consolidation. CT chest noncon revealed pulmonary artery dilations likely secondary to pulmonary hypertension. Patient now for admission to medicine for further evaluation and treatment.  (28 Aug 2022 15:36)    hospital course:  42 yr old female with PMHx of PulmHTN class I/VI (dx 2014) on continuous Treprostinil (Remodulin) (sildenafil - held initially) c/b Rt heart failure s/p PPM (Dual chamber 2016), Chronic P.E. on rivaroxaban (dx 2017), SVT s/p ablation (5/2020), Asthma (chronic steroid use - prednisone), Fe deficiency anemia who originally admitted to medicine floor  8/28/22 with c/o of palpitations accompanied by orthopnea/N/V/RUQ abd pain over a 2 day period.  This short hospital course c/b hypotension with SBP 80/41 accompanied by chest pain, transaminitis, EDOUARD with peak sCr 3.05 (now resolved, baseline sCr of 0.82) requiring transfer to MICU the evening of 8/28/22 for hypotension in setting of acute on chronic cor pulmonale/pulmHTN with end organ dysfunction (EDOUARD, transaminitis). Pt placed on dobutamine/diuretic/Marcela therapies. Course further c/b TTE (8/31/22) finding of new echogenic mass in RA concerning for clot in transit, CTA chest (8/31/22) re-demonstrated severe dilatation of Pulm arteries, without new P.E., received catheter - directed thrombus aspiration (see note interventional Cards 8/31/22).  Marcela weaned off 9/2. Left common femoral artery pseudoaneurysm was thrombin injected by vascular 9/3 was successful. Heparin gtt was resumed ON . Weaned off fio2 ; maintain 02sat > 94%  . C/w nasal moisturizer; no active bleeding . Tolerating increase sildenafil dose. Dobutamine gtt wean off 9/6 am . Discuss further plan of care with Dr. Yoon and Pulmonary Team. Transplant Team to re-evaluate patient 2/2 patients weight loss as was discussed between the transplant team and the patient prior to this admission.       24 hour events: downgraded to 8 maxime from ICU     PAST MEDICAL & SURGICAL HISTORY:  Tachycardia      Anemia      Pulmonary hypertension      Pulmonary embolism      Asthma  On home O2 nightly at 2L via NC      PE (pulmonary thromboembolism)  on Xarelto 10 mg daily      Sinusitis      Corneal disorder      Right heart failure      CAD (coronary artery disease)      H/O pulmonary hypertension      Pulmonary embolism      Asthma      History of tachycardia      On supplemental oxygen by nasal cannula  O2 @ 2L      Pacemaker      Skin mass  left upper thigh mass      History of tubal ligation      Pacemaker      H/O tubal ligation      History of pacemaker  12/19 - SuperTruper model 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 4469        Home Meds: Home Medications:  Atrovent 500 mcg/2.5 mL inhalation solution: 2.5 milliliter(s) inhaled , As Needed (29 Aug 2022 12:31)  Atrovent HFA 17 mcg/inh inhalation aerosol: puff(s) inhaled , As Needed (29 Aug 2022 12:31)  Lasix 20 mg oral tablet: 1 tab(s) orally every other day (29 Aug 2022 12:31)  omeprazole 40 mg oral delayed release capsule: 1 cap(s) orally once a day (29 Aug 2022 12:31)  predniSONE: 10 milligram(s) orally once a day (29 Aug 2022 12:31)  Remodulin 5 mg/mL injectable solution: patient is taking 42ng/kg/min via her own SQ PUMP (29 Aug 2022 12:31)  sildenafil 20 mg oral tablet: 0.5 tab(s) orally once a day (29 Aug 2022 12:31)  Vitamin B12 1000 mcg oral tablet: 1 tab(s) orally once a day (29 Aug 2022 12:31)  Xarelto 10 mg oral tablet: 1 tab(s) orally once a day (29 Aug 2022 12:31)    Allergies: Allergies    adhesives (Rash)  penicillin (Rash)  penicillin (Unknown)  vancomycin (Rash)  vancomycin (Unknown)    Intolerances    albuterol (Other; Rash)    Soc:   Advanced Directives: Presumed Full Code     ROS:    REVIEW OF SYSTEMS    [xx ] A ten-point review of systems was otherwise negative except as noted.  [ ] Due to altered mental status/intubation, subjective information were not able to be obtained from the patient. History was obtained, to the extent possible, from review of the chart and collateral sources of information.      CURRENT MEDICATIONS:   --------------------------------------------------------------------------------------  Neurologic Medications  acetaminophen     Tablet .. 650 milliGRAM(s) Oral every 6 hours PRN Mild Pain (1 - 3)  HYDROmorphone  Injectable 0.5 milliGRAM(s) IV Push every 4 hours PRN Pain  melatonin 3 milliGRAM(s) Oral at bedtime PRN Insomnia  ondansetron Injectable 4 milliGRAM(s) IV Push every 8 hours PRN Nausea and/or Vomiting    Respiratory Medications  ipratropium    for Nebulization 500 MICROGram(s) Nebulizer every 6 hours PRN Bronchospasm    Cardiovascular Medications  sildenafil (REVATIO) 20 milliGRAM(s) Oral <User Schedule>  spironolactone 25 milliGRAM(s) Oral daily    Gastrointestinal Medications  aluminum hydroxide/magnesium hydroxide/simethicone Suspension 30 milliLiter(s) Oral every 4 hours PRN Dyspepsia  cyanocobalamin 1000 MICROGram(s) Oral daily  pantoprazole    Tablet 40 milliGRAM(s) Oral before breakfast  polyethylene glycol 3350 17 Gram(s) Oral every 12 hours  senna 2 Tablet(s) Oral at bedtime  sodium chloride 0.9% lock flush 10 milliLiter(s) IV Push every 1 hour PRN Pre/post blood products, medications, blood draw, and to maintain line patency    Genitourinary Medications    Hematologic/Oncologic Medications  heparin  Infusion 1250 Unit(s)/Hr IV Continuous <Continuous>    Antimicrobial/Immunologic Medications    Endocrine/Metabolic Medications  predniSONE   Tablet 10 milliGRAM(s) Oral daily    Topical/Other Medications  artificial  tears Solution 1 Drop(s) Both EYES three times a day PRN Dry Eyes  chlorhexidine 4% Liquid 1 Application(s) Topical <User Schedule>  Remodulin (Treprostinil) 5mG/mL 1 Vial(s) 1 Vial(s) SubCutaneous Continuous Pump  sodium chloride 0.65% Nasal 1 Spray(s) Both Nostrils every 6 hours PRN Nasal Congestion    --------------------------------------------------------------------------------------    VITAL SIGNS, INS/OUTS (last 24 hours):  --------------------------------------------------------------------------------------  ICU Vital Signs Last 24 Hrs  T(C): 36.9 (07 Sep 2022 03:50), Max: 37.7 (06 Sep 2022 20:00)  T(F): 98.5 (07 Sep 2022 03:50), Max: 99.9 (06 Sep 2022 20:00)  HR: 120 (07 Sep 2022 06:40) (81 - 120)  BP: 100/60 (07 Sep 2022 06:40) (97/65 - 109/68)  BP(mean): 76 (07 Sep 2022 03:00) (76 - 76)  ABP: 90/67 (07 Sep 2022 02:00) (83/65 - 109/76)  ABP(mean): 77 (07 Sep 2022 02:00) (70 - 89)  RR: 18 (07 Sep 2022 03:50) (18 - 36)  SpO2: 92% (07 Sep 2022 03:50) (92% - 100%)    O2 Parameters below as of 07 Sep 2022 03:50  Patient On (Oxygen Delivery Method): room air          I&O's Summary    06 Sep 2022 07:01  -  07 Sep 2022 07:00  --------------------------------------------------------  IN: 849.6 mL / OUT: 1410 mL / NET: -560.4 mL      --------------------------------------------------------------------------------------    EXAM:  General/Neuro  RASS:   GCS:   Exam: Normal, NAD, alert, oriented x 3, no focal deficits.     Respiratory  Exam: Lungs **auscultation, Normal expansion/effort.    [] Tracheostomy   [] Intubated  Mechanical Ventilation:     Cardiovascular  Exam: S1, S2.  Regular rate and rhythm. * Peripheral edema    Cardiac Rhythm: Normal Sinus Rhythm  ECHO:     GI  Exam: Abdomen soft, Non-tender, Non-distended.      Tubes/Lines/Drains    [x] Peripheral IV  [] Central Venous Line     	[] R	[] L	[] IJ	[] Fem	[] SC        Type:	    Date Placed:   [] Arterial Line		[] R	[] L	[] Fem	[] Rad	[] Ax	Date Placed:   [] PICC:         	[] Midline		[] Mediport           [] Urinary Catheter		    Extremities  Exam: Extremities warm, pink, well-perfused.      Derm:  Exam: Good skin turgor, no skin breakdown.      :   Exam:     LABS  --------------------------------------------------------------------------------------  Labs:  CAPILLARY BLOOD GLUCOSE                              12.7   16.06 )-----------( 284      ( 07 Sep 2022 07:21 )             40.6         09-07    133<L>  |  98  |  9   ----------------------------<  100<H>  4.2   |  24  |  0.76      Calcium, Total Serum: 9.0 mg/dL (09-07-22 @ 00:21)      LFTs:             7.0  | 0.8  | 27       ------------------[58      ( 07 Sep 2022 00:21 )  3.7  | x    | 103         Lipase:x      Amylase:x         Blood Gas Venous - Lactate: 0.7 mmol/L (09-07-22 @ 02:25)  Blood Gas Venous - Lactate: 1.0 mmol/L (09-07-22 @ 00:05)  Blood Gas Venous - Lactate: 2.0 mmol/L (09-06-22 @ 10:05)  Blood Gas Venous - Lactate: 0.7 mmol/L (09-06-22 @ 00:05)  Blood Gas Venous - Lactate: 1.1 mmol/L (09-05-22 @ 12:00)  Blood Gas Arterial, Lactate: 0.7 mmol/L (09-04-22 @ 23:03)  Blood Gas Arterial, Lactate: 1.1 mmol/L (09-04-22 @ 12:33)    ABG - ( 04 Sep 2022 23:03 )  pH: 7.52  /  pCO2: 36    /  pO2: 82    / HCO3: 29    / Base Excess: 6.3   /  SaO2: 97.1            ABG - ( 04 Sep 2022 12:33 )  pH: 7.51  /  pCO2: 34    /  pO2: 79    / HCO3: 27    / Base Excess: 4.2   /  SaO2: 97.9            ABG - ( 03 Sep 2022 12:29 )  pH: 7.50  /  pCO2: 39    /  pO2: 113   / HCO3: 30    / Base Excess: 6.7   /  SaO2: 99.5              Coags:     12.4   ----< 1.08    ( 07 Sep 2022 00:21 )     58.9            Serum Pro-Brain Natriuretic Peptide: 71 pg/mL (09-02-22 @ 00:22)  Serum Pro-Brain Natriuretic Peptide: 97156 pg/mL (08-28-22 @ 19:33)  Serum Pro-Brain Natriuretic Peptide: 7946 pg/mL (08-28-22 @ 11:39)            --------------------------------------------------------------------------------------    OTHER LABS    IMAGING RESULTS    CONCLUSIONS:  Limited TTE in CATH  1. Overall preserved left ventricular systolic function.  2. Thrombus seen in RA.  3. Right ventricular enlargement with decreased right  ventricular systolic function.  4. No pericardial effusion seen.  Dr. CHRIS Robison Aware     Lung Transplant Progress Note  =====================================================  HPI:  42F PMH PE on xarelto, pHTN, ILD, JULIA, right sided heart failure, presents to the hospital for 2 days of palpitations. Patient reports she has had similar episodes of palpitations in the past, however workup in the past was unremarkable. Patient also reporting mild orthopnea. Denies chest pain. Denies diaphoresis. Denies recent use of stimulants including caffeine. Patient also reporting severe nausea and vomiting which has resolved with zofran in the ED. ROS otherwise unremarkable.    ED course: VS with tachycardia. CBC with anemia. CMP normal. BNP 7646. Troponin T normal. EKG with sinus tachycardia, CXR without consolidation. CT chest noncon revealed pulmonary artery dilations likely secondary to pulmonary hypertension. Patient now for admission to medicine for further evaluation and treatment.  (28 Aug 2022 15:36)    hospital course:  42 yr old female with PMHx of PulmHTN class I/VI (dx 2014) on continuous Treprostinil (Remodulin) (sildenafil - held initially) c/b Rt heart failure s/p PPM (Dual chamber 2016), Chronic P.E. on rivaroxaban (dx 2017), SVT s/p ablation (5/2020), Asthma (chronic steroid use - prednisone), Fe deficiency anemia who originally admitted to medicine floor  8/28/22 with c/o of palpitations accompanied by orthopnea/N/V/RUQ abd pain over a 2 day period.  This short hospital course c/b hypotension with SBP 80/41 accompanied by chest pain, transaminitis, EDOUARD with peak sCr 3.05 (now resolved, baseline sCr of 0.82) requiring transfer to MICU the evening of 8/28/22 for hypotension in setting of acute on chronic cor pulmonale/pulmHTN with end organ dysfunction (EDOUARD, transaminitis). Pt placed on dobutamine/diuretic/Marcela therapies. Course further c/b TTE (8/31/22) finding of new echogenic mass in RA concerning for clot in transit, CTA chest (8/31/22) re-demonstrated severe dilatation of Pulm arteries, without new P.E., received catheter - directed thrombus aspiration (see note interventional Cards 8/31/22).  Marcela weaned off 9/2. Left common femoral artery pseudoaneurysm was thrombin injected by vascular 9/3 was successful. Heparin gtt was resumed ON . Weaned off fio2 ; maintain 02sat > 94%  . C/w nasal moisturizer; no active bleeding . Tolerating increase sildenafil dose. Dobutamine gtt wean off 9/6 am . Discuss further plan of care with Dr. Yoon and Pulmonary Team. Transplant Team to re-evaluate patient 2/2 patients weight loss as was discussed between the transplant team and the patient prior to this admission.       24 hour events: downgraded to 8 maxime from ICU     PAST MEDICAL & SURGICAL HISTORY:  Tachycardia      Anemia      Pulmonary hypertension      Pulmonary embolism      Asthma  On home O2 nightly at 2L via NC      PE (pulmonary thromboembolism)  on Xarelto 10 mg daily      Sinusitis      Corneal disorder      Right heart failure      CAD (coronary artery disease)      H/O pulmonary hypertension      Pulmonary embolism      Asthma      History of tachycardia      On supplemental oxygen by nasal cannula  O2 @ 2L      Pacemaker      Skin mass  left upper thigh mass      History of tubal ligation      Pacemaker      H/O tubal ligation      History of pacemaker  12/19 - VideoBurst model [employer] 4469        Home Meds: Home Medications:  Atrovent 500 mcg/2.5 mL inhalation solution: 2.5 milliliter(s) inhaled , As Needed (29 Aug 2022 12:31)  Atrovent HFA 17 mcg/inh inhalation aerosol: puff(s) inhaled , As Needed (29 Aug 2022 12:31)  Lasix 20 mg oral tablet: 1 tab(s) orally every other day (29 Aug 2022 12:31)  omeprazole 40 mg oral delayed release capsule: 1 cap(s) orally once a day (29 Aug 2022 12:31)  predniSONE: 10 milligram(s) orally once a day (29 Aug 2022 12:31)  Remodulin 5 mg/mL injectable solution: patient is taking 42ng/kg/min via her own SQ PUMP (29 Aug 2022 12:31)  sildenafil 20 mg oral tablet: 0.5 tab(s) orally once a day (29 Aug 2022 12:31)  Vitamin B12 1000 mcg oral tablet: 1 tab(s) orally once a day (29 Aug 2022 12:31)  Xarelto 10 mg oral tablet: 1 tab(s) orally once a day (29 Aug 2022 12:31)    Allergies: Allergies    adhesives (Rash)  penicillin (Rash)  penicillin (Unknown)  vancomycin (Rash)  vancomycin (Unknown)    Intolerances    albuterol (Other; Rash)    Soc:   Advanced Directives: Presumed Full Code     ROS:    REVIEW OF SYSTEMS    [xx ] A ten-point review of systems was otherwise negative except as noted.  [ ] Due to altered mental status/intubation, subjective information were not able to be obtained from the patient. History was obtained, to the extent possible, from review of the chart and collateral sources of information.      CURRENT MEDICATIONS:   --------------------------------------------------------------------------------------  Neurologic Medications  acetaminophen     Tablet .. 650 milliGRAM(s) Oral every 6 hours PRN Mild Pain (1 - 3)  HYDROmorphone  Injectable 0.5 milliGRAM(s) IV Push every 4 hours PRN Pain  melatonin 3 milliGRAM(s) Oral at bedtime PRN Insomnia  ondansetron Injectable 4 milliGRAM(s) IV Push every 8 hours PRN Nausea and/or Vomiting    Respiratory Medications  ipratropium    for Nebulization 500 MICROGram(s) Nebulizer every 6 hours PRN Bronchospasm    Cardiovascular Medications  sildenafil (REVATIO) 20 milliGRAM(s) Oral <User Schedule>  spironolactone 25 milliGRAM(s) Oral daily    Gastrointestinal Medications  aluminum hydroxide/magnesium hydroxide/simethicone Suspension 30 milliLiter(s) Oral every 4 hours PRN Dyspepsia  cyanocobalamin 1000 MICROGram(s) Oral daily  pantoprazole    Tablet 40 milliGRAM(s) Oral before breakfast  polyethylene glycol 3350 17 Gram(s) Oral every 12 hours  senna 2 Tablet(s) Oral at bedtime  sodium chloride 0.9% lock flush 10 milliLiter(s) IV Push every 1 hour PRN Pre/post blood products, medications, blood draw, and to maintain line patency    Genitourinary Medications    Hematologic/Oncologic Medications  heparin  Infusion 1250 Unit(s)/Hr IV Continuous <Continuous>    Antimicrobial/Immunologic Medications    Endocrine/Metabolic Medications  predniSONE   Tablet 10 milliGRAM(s) Oral daily    Topical/Other Medications  artificial  tears Solution 1 Drop(s) Both EYES three times a day PRN Dry Eyes  chlorhexidine 4% Liquid 1 Application(s) Topical <User Schedule>  Remodulin (Treprostinil) 5mG/mL 1 Vial(s) 1 Vial(s) SubCutaneous Continuous Pump  sodium chloride 0.65% Nasal 1 Spray(s) Both Nostrils every 6 hours PRN Nasal Congestion    --------------------------------------------------------------------------------------    VITAL SIGNS, INS/OUTS (last 24 hours):  --------------------------------------------------------------------------------------  ICU Vital Signs Last 24 Hrs  T(C): 36.9 (07 Sep 2022 03:50), Max: 37.7 (06 Sep 2022 20:00)  T(F): 98.5 (07 Sep 2022 03:50), Max: 99.9 (06 Sep 2022 20:00)  HR: 120 (07 Sep 2022 06:40) (81 - 120)  BP: 100/60 (07 Sep 2022 06:40) (97/65 - 109/68)  BP(mean): 76 (07 Sep 2022 03:00) (76 - 76)  ABP: 90/67 (07 Sep 2022 02:00) (83/65 - 109/76)  ABP(mean): 77 (07 Sep 2022 02:00) (70 - 89)  RR: 18 (07 Sep 2022 03:50) (18 - 36)  SpO2: 92% (07 Sep 2022 03:50) (92% - 100%)    O2 Parameters below as of 07 Sep 2022 03:50  Patient On (Oxygen Delivery Method): room air          I&O's Summary    06 Sep 2022 07:01  -  07 Sep 2022 07:00  --------------------------------------------------------  IN: 849.6 mL / OUT: 1410 mL / NET: -560.4 mL      --------------------------------------------------------------------------------------    EXAM:  General/Neuro  RASS:   GCS:   Exam: Normal, NAD, alert, oriented x 3, no focal deficits.     Respiratory  Exam: Lungs **auscultation, Normal expansion/effort.    [] Tracheostomy   [] Intubated  Mechanical Ventilation:     Cardiovascular  Exam: S1, S2.  Regular rate and rhythm. * Peripheral edema    Cardiac Rhythm: Normal Sinus Rhythm  ECHO:     GI  Exam: Abdomen soft, Non-tender, Non-distended.      Tubes/Lines/Drains    [x] Peripheral IV  [] Central Venous Line     	[] R	[] L	[] IJ	[] Fem	[] SC        Type:	    Date Placed:   [] Arterial Line		[] R	[] L	[] Fem	[] Rad	[] Ax	Date Placed:   [] PICC:         	[] Midline		[] Mediport           [] Urinary Catheter		    Extremities  Exam: Extremities warm, pink, well-perfused.      Derm:  Exam: Good skin turgor, no skin breakdown.      :   Exam:     LABS  --------------------------------------------------------------------------------------  Labs:  CAPILLARY BLOOD GLUCOSE                              12.7   16.06 )-----------( 284      ( 07 Sep 2022 07:21 )             40.6         09-07    133<L>  |  98  |  9   ----------------------------<  100<H>  4.2   |  24  |  0.76      Calcium, Total Serum: 9.0 mg/dL (09-07-22 @ 00:21)      LFTs:             7.0  | 0.8  | 27       ------------------[58      ( 07 Sep 2022 00:21 )  3.7  | x    | 103         Lipase:x      Amylase:x         Blood Gas Venous - Lactate: 0.7 mmol/L (09-07-22 @ 02:25)  Blood Gas Venous - Lactate: 1.0 mmol/L (09-07-22 @ 00:05)  Blood Gas Venous - Lactate: 2.0 mmol/L (09-06-22 @ 10:05)  Blood Gas Venous - Lactate: 0.7 mmol/L (09-06-22 @ 00:05)  Blood Gas Venous - Lactate: 1.1 mmol/L (09-05-22 @ 12:00)  Blood Gas Arterial, Lactate: 0.7 mmol/L (09-04-22 @ 23:03)  Blood Gas Arterial, Lactate: 1.1 mmol/L (09-04-22 @ 12:33)    ABG - ( 04 Sep 2022 23:03 )  pH: 7.52  /  pCO2: 36    /  pO2: 82    / HCO3: 29    / Base Excess: 6.3   /  SaO2: 97.1            ABG - ( 04 Sep 2022 12:33 )  pH: 7.51  /  pCO2: 34    /  pO2: 79    / HCO3: 27    / Base Excess: 4.2   /  SaO2: 97.9            ABG - ( 03 Sep 2022 12:29 )  pH: 7.50  /  pCO2: 39    /  pO2: 113   / HCO3: 30    / Base Excess: 6.7   /  SaO2: 99.5              Coags:     12.4   ----< 1.08    ( 07 Sep 2022 00:21 )     58.9            Serum Pro-Brain Natriuretic Peptide: 71 pg/mL (09-02-22 @ 00:22)  Serum Pro-Brain Natriuretic Peptide: 91151 pg/mL (08-28-22 @ 19:33)  Serum Pro-Brain Natriuretic Peptide: 7946 pg/mL (08-28-22 @ 11:39)            --------------------------------------------------------------------------------------    OTHER LABS    IMAGING RESULTS    CONCLUSIONS:  Limited TTE in CATH  1. Overall preserved left ventricular systolic function.  2. Thrombus seen in RA.  3. Right ventricular enlargement with decreased right  ventricular systolic function.  4. No pericardial effusion seen.  Dr. CHRIS Robison Aware    < from: Cardiac Cath Lab - Adult (07.23.20 @ 09:26) >  DIAGNOSTIC IMPRESSIONS: Low right heart pressure  Severe pulmonary hypertension (sPAP 80mmHg, mPAP 57mmHg)  PCWP = 22mmHg with a V wave of 24mmHg  LVEDP = 4mmHg  No aortic valve stenosis  PVR = 8.57Wu  Xenia CO // CI = 4.00l/min // 2.04l/min/m2    < end of copied text >       Lung Transplant Progress Note  =====================================================  HPI:  42F PMH PE on xarelto, pHTN, ILD, JULIA, right sided heart failure, presents to the hospital for 2 days of palpitations. Patient reports she has had similar episodes of palpitations in the past, however workup in the past was unremarkable. Patient also reporting mild orthopnea. Denies chest pain. Denies diaphoresis. Denies recent use of stimulants including caffeine. Patient also reporting severe nausea and vomiting which has resolved with zofran in the ED. ROS otherwise unremarkable.    ED course: VS with tachycardia. CBC with anemia. CMP normal. BNP 7646. Troponin T normal. EKG with sinus tachycardia, CXR without consolidation. CT chest noncon revealed pulmonary artery dilations likely secondary to pulmonary hypertension. Patient now for admission to medicine for further evaluation and treatment.  (28 Aug 2022 15:36)    hospital course:  42 yr old female with PMHx of PulmHTN class I/VI (dx 2014) on continuous Treprostinil (Remodulin) (sildenafil - held initially) c/b Rt heart failure s/p PPM (Dual chamber 2016), Chronic P.E. on rivaroxaban (dx 2017), SVT s/p ablation (5/2020), Asthma (chronic steroid use - prednisone), Fe deficiency anemia who originally admitted to medicine floor  8/28/22 with c/o of palpitations accompanied by orthopnea/N/V/RUQ abd pain over a 2 day period.  This short hospital course c/b hypotension with SBP 80/41 accompanied by chest pain, transaminitis, EDOUARD with peak sCr 3.05 (now resolved, baseline sCr of 0.82) requiring transfer to MICU the evening of 8/28/22 for hypotension in setting of acute on chronic cor pulmonale/pulmHTN with end organ dysfunction (EDOUARD, transaminitis). Pt placed on dobutamine/diuretic/Marcela therapies. Course further c/b TTE (8/31/22) finding of new echogenic mass in RA concerning for clot in transit, CTA chest (8/31/22) re-demonstrated severe dilatation of Pulm arteries, without new P.E., received catheter - directed thrombus aspiration (see note interventional Cards 8/31/22).  Marcela weaned off 9/2. Left common femoral artery pseudoaneurysm was thrombin injected by vascular 9/3 was successful. Heparin gtt was resumed ON . Weaned off fio2 ; maintain 02sat > 94%  . C/w nasal moisturizer; no active bleeding . Tolerating increase sildenafil dose. Dobutamine gtt wean off 9/6 am . Discuss further plan of care with Dr. Yoon and Pulmonary Team. Transplant Team to re-evaluate patient 2/2 patients weight loss as was discussed between the transplant team and the patient prior to this admission.       PAST MEDICAL & SURGICAL HISTORY:  Tachycardia      Anemia      Pulmonary hypertension      Pulmonary embolism      Asthma  On home O2 nightly at 2L via NC      PE (pulmonary thromboembolism)  on Xarelto 10 mg daily      Sinusitis      Corneal disorder      Right heart failure      CAD (coronary artery disease)      H/O pulmonary hypertension      Pulmonary embolism      Asthma      History of tachycardia      On supplemental oxygen by nasal cannula  O2 @ 2L      Pacemaker      Skin mass  left upper thigh mass      History of tubal ligation      Pacemaker      H/O tubal ligation      History of pacemaker  12/19 - [email or web address] model Ingevity 4469        Home Meds: Home Medications:  Atrovent 500 mcg/2.5 mL inhalation solution: 2.5 milliliter(s) inhaled , As Needed (29 Aug 2022 12:31)  Atrovent HFA 17 mcg/inh inhalation aerosol: puff(s) inhaled , As Needed (29 Aug 2022 12:31)  Lasix 20 mg oral tablet: 1 tab(s) orally every other day (29 Aug 2022 12:31)  omeprazole 40 mg oral delayed release capsule: 1 cap(s) orally once a day (29 Aug 2022 12:31)  predniSONE: 10 milligram(s) orally once a day (29 Aug 2022 12:31)  Remodulin 5 mg/mL injectable solution: patient is taking 42ng/kg/min via her own SQ PUMP (29 Aug 2022 12:31)  sildenafil 20 mg oral tablet: 0.5 tab(s) orally once a day (29 Aug 2022 12:31)  Vitamin B12 1000 mcg oral tablet: 1 tab(s) orally once a day (29 Aug 2022 12:31)  Xarelto 10 mg oral tablet: 1 tab(s) orally once a day (29 Aug 2022 12:31)    Allergies: Allergies    adhesives (Rash)  penicillin (Rash)  penicillin (Unknown)  vancomycin (Rash)  vancomycin (Unknown)    Intolerances    albuterol (Other; Rash)    Soc:   Advanced Directives: Presumed Full Code     ROS:    REVIEW OF SYSTEMS    [xx ] A ten-point review of systems was otherwise negative except as noted.  [ ] Due to altered mental status/intubation, subjective information were not able to be obtained from the patient. History was obtained, to the extent possible, from review of the chart and collateral sources of information.      CURRENT MEDICATIONS:   --------------------------------------------------------------------------------------  Neurologic Medications  acetaminophen     Tablet .. 650 milliGRAM(s) Oral every 6 hours PRN Mild Pain (1 - 3)  HYDROmorphone  Injectable 0.5 milliGRAM(s) IV Push every 4 hours PRN Pain  melatonin 3 milliGRAM(s) Oral at bedtime PRN Insomnia  ondansetron Injectable 4 milliGRAM(s) IV Push every 8 hours PRN Nausea and/or Vomiting    Respiratory Medications  ipratropium    for Nebulization 500 MICROGram(s) Nebulizer every 6 hours PRN Bronchospasm    Cardiovascular Medications  sildenafil (REVATIO) 20 milliGRAM(s) Oral <User Schedule>  spironolactone 25 milliGRAM(s) Oral daily    Gastrointestinal Medications  aluminum hydroxide/magnesium hydroxide/simethicone Suspension 30 milliLiter(s) Oral every 4 hours PRN Dyspepsia  cyanocobalamin 1000 MICROGram(s) Oral daily  pantoprazole    Tablet 40 milliGRAM(s) Oral before breakfast  polyethylene glycol 3350 17 Gram(s) Oral every 12 hours  senna 2 Tablet(s) Oral at bedtime  sodium chloride 0.9% lock flush 10 milliLiter(s) IV Push every 1 hour PRN Pre/post blood products, medications, blood draw, and to maintain line patency    Genitourinary Medications    Hematologic/Oncologic Medications  heparin  Infusion 1250 Unit(s)/Hr IV Continuous <Continuous>    Antimicrobial/Immunologic Medications    Endocrine/Metabolic Medications  predniSONE   Tablet 10 milliGRAM(s) Oral daily    Topical/Other Medications  artificial  tears Solution 1 Drop(s) Both EYES three times a day PRN Dry Eyes  chlorhexidine 4% Liquid 1 Application(s) Topical <User Schedule>  Remodulin (Treprostinil) 5mG/mL 1 Vial(s) 1 Vial(s) SubCutaneous Continuous Pump  sodium chloride 0.65% Nasal 1 Spray(s) Both Nostrils every 6 hours PRN Nasal Congestion    --------------------------------------------------------------------------------------    VITAL SIGNS, INS/OUTS (last 24 hours):  --------------------------------------------------------------------------------------  ICU Vital Signs Last 24 Hrs  T(C): 36.9 (07 Sep 2022 03:50), Max: 37.7 (06 Sep 2022 20:00)  T(F): 98.5 (07 Sep 2022 03:50), Max: 99.9 (06 Sep 2022 20:00)  HR: 120 (07 Sep 2022 06:40) (81 - 120)  BP: 100/60 (07 Sep 2022 06:40) (97/65 - 109/68)  BP(mean): 76 (07 Sep 2022 03:00) (76 - 76)  ABP: 90/67 (07 Sep 2022 02:00) (83/65 - 109/76)  ABP(mean): 77 (07 Sep 2022 02:00) (70 - 89)  RR: 18 (07 Sep 2022 03:50) (18 - 36)  SpO2: 92% (07 Sep 2022 03:50) (92% - 100%)    O2 Parameters below as of 07 Sep 2022 03:50  Patient On (Oxygen Delivery Method): room air          I&O's Summary    06 Sep 2022 07:01  -  07 Sep 2022 07:00  --------------------------------------------------------  IN: 849.6 mL / OUT: 1410 mL / NET: -560.4 mL      --------------------------------------------------------------------------------------    EXAM:  General/Neuro  RASS:   GCS:   Exam: Normal, NAD, alert, oriented x 3, no focal deficits.     Respiratory  Exam: Lungs **auscultation, Normal expansion/effort.    [] Tracheostomy   [] Intubated  Mechanical Ventilation:     Cardiovascular  Exam: S1, S2.  Regular rate and rhythm. * Peripheral edema    Cardiac Rhythm: Normal Sinus Rhythm  ECHO:     GI  Exam: Abdomen soft, Non-tender, Non-distended.      Tubes/Lines/Drains    [x] Peripheral IV  [] Central Venous Line     	[] R	[] L	[] IJ	[] Fem	[] SC        Type:	    Date Placed:   [] Arterial Line		[] R	[] L	[] Fem	[] Rad	[] Ax	Date Placed:   [] PICC:         	[] Midline		[] Mediport           [] Urinary Catheter		    Extremities  Exam: Extremities warm, pink, well-perfused.      Derm:  Exam: Good skin turgor, no skin breakdown.      :   Exam:     LABS  --------------------------------------------------------------------------------------  Labs:  CAPILLARY BLOOD GLUCOSE                              12.7   16.06 )-----------( 284      ( 07 Sep 2022 07:21 )             40.6         09-07    133<L>  |  98  |  9   ----------------------------<  100<H>  4.2   |  24  |  0.76      Calcium, Total Serum: 9.0 mg/dL (09-07-22 @ 00:21)      LFTs:             7.0  | 0.8  | 27       ------------------[58      ( 07 Sep 2022 00:21 )  3.7  | x    | 103         Lipase:x      Amylase:x         Blood Gas Venous - Lactate: 0.7 mmol/L (09-07-22 @ 02:25)  Blood Gas Venous - Lactate: 1.0 mmol/L (09-07-22 @ 00:05)  Blood Gas Venous - Lactate: 2.0 mmol/L (09-06-22 @ 10:05)  Blood Gas Venous - Lactate: 0.7 mmol/L (09-06-22 @ 00:05)  Blood Gas Venous - Lactate: 1.1 mmol/L (09-05-22 @ 12:00)  Blood Gas Arterial, Lactate: 0.7 mmol/L (09-04-22 @ 23:03)  Blood Gas Arterial, Lactate: 1.1 mmol/L (09-04-22 @ 12:33)    ABG - ( 04 Sep 2022 23:03 )  pH: 7.52  /  pCO2: 36    /  pO2: 82    / HCO3: 29    / Base Excess: 6.3   /  SaO2: 97.1            ABG - ( 04 Sep 2022 12:33 )  pH: 7.51  /  pCO2: 34    /  pO2: 79    / HCO3: 27    / Base Excess: 4.2   /  SaO2: 97.9            ABG - ( 03 Sep 2022 12:29 )  pH: 7.50  /  pCO2: 39    /  pO2: 113   / HCO3: 30    / Base Excess: 6.7   /  SaO2: 99.5              Coags:     12.4   ----< 1.08    ( 07 Sep 2022 00:21 )     58.9            Serum Pro-Brain Natriuretic Peptide: 71 pg/mL (09-02-22 @ 00:22)  Serum Pro-Brain Natriuretic Peptide: 67231 pg/mL (08-28-22 @ 19:33)  Serum Pro-Brain Natriuretic Peptide: 7946 pg/mL (08-28-22 @ 11:39)            --------------------------------------------------------------------------------------    OTHER LABS    IMAGING RESULTS    CONCLUSIONS:  Limited TTE in CATH  1. Overall preserved left ventricular systolic function.  2. Thrombus seen in RA.  3. Right ventricular enlargement with decreased right  ventricular systolic function.  4. No pericardial effusion seen.  Dr. CHRIS Robison Aware    < from: Cardiac Cath Lab - Adult (07.23.20 @ 09:26) >  DIAGNOSTIC IMPRESSIONS: Low right heart pressure  Severe pulmonary hypertension (sPAP 80mmHg, mPAP 57mmHg)  PCWP = 22mmHg with a V wave of 24mmHg  LVEDP = 4mmHg  No aortic valve stenosis  PVR = 8.57Wu  Xenia CO // CI = 4.00l/min // 2.04l/min/m2    < end of copied text >      < from: CT Angio Chest PE Protocol w/ IV Cont (08.31.22 @ 15:12) >  FINDINGS:    PULMONARY ANGIOGRAM: Again noted there is severe dilatation of the   pulmonary arteries representing pulmonary hypertension. Pruningsegmental   branches of of the left upper lobe pulmonary artery likely secondary to   chronic emboli again noted. No acute pulmonary embolus.    LYMPH NODES: No mediastinal lymphadenopathy.    HEART/VASCULATURE: Right ventricular hypertrophy. Trace pericardial   fluid. Left-sided dual-lead pacemaker in place. Thoracic aorta normal in   course and caliber.    AIRWAYS/LUNGS/PLEURA: Central airways are patent. Bilateral groundglass   opacities are unchanged likely secondary to pulmonary hypertension.    UPPER ABDOMEN: Unremarkable.    BONES/SOFT TISSUES: Within normal limits.    IMPRESSION:    No acute pulmonary embolus.    Severe dilatation of the pulmonary arteries representing pulmonary   hypertension is again noted. Bilateral groundglass opacities likely   manifestation of pulmonary hypertension are unchanged.    --- End of Report ---    < end of copied text >   Lung Transplant Progress Note  =====================================================  HPI:  42F PMH PE on xarelto, pHTN, ILD, JULIA, right sided heart failure, presents to the hospital for 2 days of palpitations. Patient reports she has had similar episodes of palpitations in the past, however workup in the past was unremarkable. Patient also reporting mild orthopnea. Denies chest pain. Denies diaphoresis. Denies recent use of stimulants including caffeine. Patient also reporting severe nausea and vomiting which has resolved with zofran in the ED. ROS otherwise unremarkable.    ED course: VS with tachycardia. CBC with anemia. CMP normal. BNP 7646. Troponin T normal. EKG with sinus tachycardia, CXR without consolidation. CT chest noncon revealed pulmonary artery dilations likely secondary to pulmonary hypertension. Patient now for admission to medicine for further evaluation and treatment.  (28 Aug 2022 15:36)    hospital course:  42 yr old female with PMHx of PulmHTN class I/VI (dx 2014) on continuous Treprostinil (Remodulin) (sildenafil - held initially) c/b Rt heart failure s/p PPM (Dual chamber 2016), Chronic P.E. on rivaroxaban (dx 2017), SVT s/p ablation (5/2020), Asthma (chronic steroid use - prednisone), Fe deficiency anemia who originally admitted to medicine floor  8/28/22 with c/o of palpitations accompanied by orthopnea/N/V/RUQ abd pain over a 2 day period.  This short hospital course c/b hypotension with SBP 80/41 accompanied by chest pain, transaminitis, EDOUARD with peak sCr 3.05 (now resolved, baseline sCr of 0.82) requiring transfer to MICU the evening of 8/28/22 for hypotension in setting of acute on chronic cor pulmonale/pulmHTN with end organ dysfunction (EDOUARD, transaminitis). Pt placed on dobutamine/diuretic/Marcela therapies. Course further c/b TTE (8/31/22) finding of new echogenic mass in RA concerning for clot in transit, CTA chest (8/31/22) re-demonstrated severe dilatation of Pulm arteries, without new P.E., received catheter - directed thrombus aspiration (see note interventional Cards 8/31/22).  Marcela weaned off 9/2. Left common femoral artery pseudoaneurysm was thrombin injected by vascular 9/3 was successful. Heparin gtt was resumed ON . Weaned off fio2 ; maintain 02sat > 94%  . C/w nasal moisturizer; no active bleeding . Tolerating increase sildenafil dose. Dobutamine gtt wean off 9/6 am . Discuss further plan of care with Dr. Yoon and Pulmonary Team. Transplant Team to re-evaluate patient 2/2 patients weight loss as was discussed between the transplant team and the patient prior to this admission.       PAST MEDICAL & SURGICAL HISTORY:  Tachycardia      Anemia      Pulmonary hypertension      Pulmonary embolism      Asthma  On home O2 nightly at 2L via NC      PE (pulmonary thromboembolism)  on Xarelto 10 mg daily      Sinusitis      Corneal disorder      Right heart failure      CAD (coronary artery disease)      H/O pulmonary hypertension      Pulmonary embolism      Asthma      History of tachycardia      On supplemental oxygen by nasal cannula  O2 @ 2L      Pacemaker      Skin mass  left upper thigh mass      History of tubal ligation      Pacemaker      H/O tubal ligation      History of pacemaker  12/19 - [employer] model Ingevity 4469        Home Meds: Home Medications:  Atrovent 500 mcg/2.5 mL inhalation solution: 2.5 milliliter(s) inhaled , As Needed (29 Aug 2022 12:31)  Atrovent HFA 17 mcg/inh inhalation aerosol: puff(s) inhaled , As Needed (29 Aug 2022 12:31)  Lasix 20 mg oral tablet: 1 tab(s) orally every other day (29 Aug 2022 12:31)  omeprazole 40 mg oral delayed release capsule: 1 cap(s) orally once a day (29 Aug 2022 12:31)  predniSONE: 10 milligram(s) orally once a day (29 Aug 2022 12:31)  Remodulin 5 mg/mL injectable solution: patient is taking 42ng/kg/min via her own SQ PUMP (29 Aug 2022 12:31)  sildenafil 20 mg oral tablet: 0.5 tab(s) orally once a day (29 Aug 2022 12:31)  Vitamin B12 1000 mcg oral tablet: 1 tab(s) orally once a day (29 Aug 2022 12:31)  Xarelto 10 mg oral tablet: 1 tab(s) orally once a day (29 Aug 2022 12:31)    Allergies: Allergies    adhesives (Rash)  penicillin (Rash)  penicillin (Unknown)  vancomycin (Rash)  vancomycin (Unknown)    Intolerances    albuterol (Other; Rash)    Soc:   Advanced Directives: Presumed Full Code     ROS:    REVIEW OF SYSTEMS    [xx ] A ten-point review of systems was otherwise negative except as noted.  [ ] Due to altered mental status/intubation, subjective information were not able to be obtained from the patient. History was obtained, to the extent possible, from review of the chart and collateral sources of information.      CURRENT MEDICATIONS:   --------------------------------------------------------------------------------------  Neurologic Medications  acetaminophen     Tablet .. 650 milliGRAM(s) Oral every 6 hours PRN Mild Pain (1 - 3)  HYDROmorphone  Injectable 0.5 milliGRAM(s) IV Push every 4 hours PRN Pain  melatonin 3 milliGRAM(s) Oral at bedtime PRN Insomnia  ondansetron Injectable 4 milliGRAM(s) IV Push every 8 hours PRN Nausea and/or Vomiting    Respiratory Medications  ipratropium    for Nebulization 500 MICROGram(s) Nebulizer every 6 hours PRN Bronchospasm    Cardiovascular Medications  sildenafil (REVATIO) 20 milliGRAM(s) Oral <User Schedule>  spironolactone 25 milliGRAM(s) Oral daily    Gastrointestinal Medications  aluminum hydroxide/magnesium hydroxide/simethicone Suspension 30 milliLiter(s) Oral every 4 hours PRN Dyspepsia  cyanocobalamin 1000 MICROGram(s) Oral daily  pantoprazole    Tablet 40 milliGRAM(s) Oral before breakfast  polyethylene glycol 3350 17 Gram(s) Oral every 12 hours  senna 2 Tablet(s) Oral at bedtime  sodium chloride 0.9% lock flush 10 milliLiter(s) IV Push every 1 hour PRN Pre/post blood products, medications, blood draw, and to maintain line patency    Genitourinary Medications    Hematologic/Oncologic Medications  heparin  Infusion 1250 Unit(s)/Hr IV Continuous <Continuous>    Antimicrobial/Immunologic Medications    Endocrine/Metabolic Medications  predniSONE   Tablet 10 milliGRAM(s) Oral daily    Topical/Other Medications  artificial  tears Solution 1 Drop(s) Both EYES three times a day PRN Dry Eyes  chlorhexidine 4% Liquid 1 Application(s) Topical <User Schedule>  Remodulin (Treprostinil) 5mG/mL 1 Vial(s) 1 Vial(s) SubCutaneous Continuous Pump  sodium chloride 0.65% Nasal 1 Spray(s) Both Nostrils every 6 hours PRN Nasal Congestion    --------------------------------------------------------------------------------------    VITAL SIGNS, INS/OUTS (last 24 hours):  --------------------------------------------------------------------------------------  ICU Vital Signs Last 24 Hrs  T(C): 36.9 (07 Sep 2022 03:50), Max: 37.7 (06 Sep 2022 20:00)  T(F): 98.5 (07 Sep 2022 03:50), Max: 99.9 (06 Sep 2022 20:00)  HR: 120 (07 Sep 2022 06:40) (81 - 120)  BP: 100/60 (07 Sep 2022 06:40) (97/65 - 109/68)  BP(mean): 76 (07 Sep 2022 03:00) (76 - 76)  ABP: 90/67 (07 Sep 2022 02:00) (83/65 - 109/76)  ABP(mean): 77 (07 Sep 2022 02:00) (70 - 89)  RR: 18 (07 Sep 2022 03:50) (18 - 36)  SpO2: 92% (07 Sep 2022 03:50) (92% - 100%)    O2 Parameters below as of 07 Sep 2022 03:50  Patient On (Oxygen Delivery Method): room air          I&O's Summary    06 Sep 2022 07:01  -  07 Sep 2022 07:00  --------------------------------------------------------  IN: 849.6 mL / OUT: 1410 mL / NET: -560.4 mL      --------------------------------------------------------------------------------------    EXAM:  General/Neuro  Exam: Normal, NAD, alert, oriented x 3, no focal deficits.     Respiratory  Exam: Lungs clear to auscultation, Normal expansion/effort.      Cardiovascular  Exam: S1, S2.  Regular rate and rhythm.    GI  Exam: Abdomen soft, Non-tender, Non-distended.      Tubes/Lines/Drains    [x] Peripheral IV  [] Central Venous Line     	[] R	[] L	[] IJ	[] Fem	[] SC        Type:	    Date Placed:   [] Arterial Line		[] R	[] L	[] Fem	[] Rad	[] Ax	Date Placed:   [] PICC:         	[] Midline		[] Mediport           [] Urinary Catheter		    Extremities  Exam: Extremities warm, pink, well-perfused.      Derm:  Exam: Good skin turgor, no skin breakdown.      :   Exam: deferred    LABS  --------------------------------------------------------------------------------------  Labs:  CAPILLARY BLOOD GLUCOSE                              12.7   16.06 )-----------( 284      ( 07 Sep 2022 07:21 )             40.6         09-07    133<L>  |  98  |  9   ----------------------------<  100<H>  4.2   |  24  |  0.76      Calcium, Total Serum: 9.0 mg/dL (09-07-22 @ 00:21)      LFTs:             7.0  | 0.8  | 27       ------------------[58      ( 07 Sep 2022 00:21 )  3.7  | x    | 103         Lipase:x      Amylase:x         Blood Gas Venous - Lactate: 0.7 mmol/L (09-07-22 @ 02:25)  Blood Gas Venous - Lactate: 1.0 mmol/L (09-07-22 @ 00:05)  Blood Gas Venous - Lactate: 2.0 mmol/L (09-06-22 @ 10:05)  Blood Gas Venous - Lactate: 0.7 mmol/L (09-06-22 @ 00:05)  Blood Gas Venous - Lactate: 1.1 mmol/L (09-05-22 @ 12:00)  Blood Gas Arterial, Lactate: 0.7 mmol/L (09-04-22 @ 23:03)  Blood Gas Arterial, Lactate: 1.1 mmol/L (09-04-22 @ 12:33)    ABG - ( 04 Sep 2022 23:03 )  pH: 7.52  /  pCO2: 36    /  pO2: 82    / HCO3: 29    / Base Excess: 6.3   /  SaO2: 97.1            ABG - ( 04 Sep 2022 12:33 )  pH: 7.51  /  pCO2: 34    /  pO2: 79    / HCO3: 27    / Base Excess: 4.2   /  SaO2: 97.9            ABG - ( 03 Sep 2022 12:29 )  pH: 7.50  /  pCO2: 39    /  pO2: 113   / HCO3: 30    / Base Excess: 6.7   /  SaO2: 99.5              Coags:     12.4   ----< 1.08    ( 07 Sep 2022 00:21 )     58.9            Serum Pro-Brain Natriuretic Peptide: 71 pg/mL (09-02-22 @ 00:22)  Serum Pro-Brain Natriuretic Peptide: 43654 pg/mL (08-28-22 @ 19:33)  Serum Pro-Brain Natriuretic Peptide: 7946 pg/mL (08-28-22 @ 11:39)            --------------------------------------------------------------------------------------    OTHER LABS    IMAGING RESULTS    CONCLUSIONS:  Limited TTE in CATH  1. Overall preserved left ventricular systolic function.  2. Thrombus seen in RA.  3. Right ventricular enlargement with decreased right  ventricular systolic function.  4. No pericardial effusion seen.  Dr. CHRIS Robison Aware    < from: Cardiac Cath Lab - Adult (07.23.20 @ 09:26) >  DIAGNOSTIC IMPRESSIONS: Low right heart pressure  Severe pulmonary hypertension (sPAP 80mmHg, mPAP 57mmHg)  PCWP = 22mmHg with a V wave of 24mmHg  LVEDP = 4mmHg  No aortic valve stenosis  PVR = 8.57Wu  Xenia CO // CI = 4.00l/min // 2.04l/min/m2    < end of copied text >      < from: CT Angio Chest PE Protocol w/ IV Cont (08.31.22 @ 15:12) >  FINDINGS:    PULMONARY ANGIOGRAM: Again noted there is severe dilatation of the   pulmonary arteries representing pulmonary hypertension. Pruningsegmental   branches of of the left upper lobe pulmonary artery likely secondary to   chronic emboli again noted. No acute pulmonary embolus.    LYMPH NODES: No mediastinal lymphadenopathy.    HEART/VASCULATURE: Right ventricular hypertrophy. Trace pericardial   fluid. Left-sided dual-lead pacemaker in place. Thoracic aorta normal in   course and caliber.    AIRWAYS/LUNGS/PLEURA: Central airways are patent. Bilateral groundglass   opacities are unchanged likely secondary to pulmonary hypertension.    UPPER ABDOMEN: Unremarkable.    BONES/SOFT TISSUES: Within normal limits.    IMPRESSION:    No acute pulmonary embolus.    Severe dilatation of the pulmonary arteries representing pulmonary   hypertension is again noted. Bilateral groundglass opacities likely   manifestation of pulmonary hypertension are unchanged.    --- End of Report ---    < end of copied text >

## 2022-09-08 ENCOUNTER — APPOINTMENT (OUTPATIENT)
Dept: PULMONOLOGY | Facility: CLINIC | Age: 42
End: 2022-09-08

## 2022-09-08 DIAGNOSIS — R78.81 BACTEREMIA: ICD-10-CM

## 2022-09-08 DIAGNOSIS — Z01.818 ENCOUNTER FOR OTHER PREPROCEDURAL EXAMINATION: ICD-10-CM

## 2022-09-08 LAB
ALBUMIN SERPL ELPH-MCNC: 3.6 G/DL — SIGNIFICANT CHANGE UP (ref 3.3–5)
ALP SERPL-CCNC: 60 U/L — SIGNIFICANT CHANGE UP (ref 40–120)
ALT FLD-CCNC: 79 U/L — HIGH (ref 10–45)
ANION GAP SERPL CALC-SCNC: 13 MMOL/L — SIGNIFICANT CHANGE UP (ref 5–17)
APTT BLD: 36.1 SEC — HIGH (ref 27.5–35.5)
AST SERPL-CCNC: 23 U/L — SIGNIFICANT CHANGE UP (ref 10–40)
BASOPHILS # BLD AUTO: 0.07 K/UL — SIGNIFICANT CHANGE UP (ref 0–0.2)
BASOPHILS NFR BLD AUTO: 0.5 % — SIGNIFICANT CHANGE UP (ref 0–2)
BILIRUB SERPL-MCNC: 0.8 MG/DL — SIGNIFICANT CHANGE UP (ref 0.2–1.2)
BUN SERPL-MCNC: 11 MG/DL — SIGNIFICANT CHANGE UP (ref 7–23)
CALCIUM SERPL-MCNC: 8.9 MG/DL — SIGNIFICANT CHANGE UP (ref 8.4–10.5)
CHLORIDE SERPL-SCNC: 97 MMOL/L — SIGNIFICANT CHANGE UP (ref 96–108)
CO2 SERPL-SCNC: 23 MMOL/L — SIGNIFICANT CHANGE UP (ref 22–31)
CREAT SERPL-MCNC: 0.88 MG/DL — SIGNIFICANT CHANGE UP (ref 0.5–1.3)
EGFR: 84 ML/MIN/1.73M2 — SIGNIFICANT CHANGE UP
EOSINOPHIL # BLD AUTO: 0.28 K/UL — SIGNIFICANT CHANGE UP (ref 0–0.5)
EOSINOPHIL NFR BLD AUTO: 2 % — SIGNIFICANT CHANGE UP (ref 0–6)
GLUCOSE SERPL-MCNC: 89 MG/DL — SIGNIFICANT CHANGE UP (ref 70–99)
HCT VFR BLD CALC: 37.1 % — SIGNIFICANT CHANGE UP (ref 34.5–45)
HGB BLD-MCNC: 11.6 G/DL — SIGNIFICANT CHANGE UP (ref 11.5–15.5)
IMM GRANULOCYTES NFR BLD AUTO: 0.4 % — SIGNIFICANT CHANGE UP (ref 0–1.5)
LYMPHOCYTES # BLD AUTO: 2.92 K/UL — SIGNIFICANT CHANGE UP (ref 1–3.3)
LYMPHOCYTES # BLD AUTO: 21.3 % — SIGNIFICANT CHANGE UP (ref 13–44)
MAGNESIUM SERPL-MCNC: 1.8 MG/DL — SIGNIFICANT CHANGE UP (ref 1.6–2.6)
MCHC RBC-ENTMCNC: 22.5 PG — LOW (ref 27–34)
MCHC RBC-ENTMCNC: 31.3 GM/DL — LOW (ref 32–36)
MCV RBC AUTO: 71.9 FL — LOW (ref 80–100)
MONOCYTES # BLD AUTO: 1.18 K/UL — HIGH (ref 0–0.9)
MONOCYTES NFR BLD AUTO: 8.6 % — SIGNIFICANT CHANGE UP (ref 2–14)
NEUTROPHILS # BLD AUTO: 9.22 K/UL — HIGH (ref 1.8–7.4)
NEUTROPHILS NFR BLD AUTO: 67.2 % — SIGNIFICANT CHANGE UP (ref 43–77)
NRBC # BLD: 0 /100 WBCS — SIGNIFICANT CHANGE UP (ref 0–0)
PHOSPHATE SERPL-MCNC: 4.2 MG/DL — SIGNIFICANT CHANGE UP (ref 2.5–4.5)
PLATELET # BLD AUTO: 292 K/UL — SIGNIFICANT CHANGE UP (ref 150–400)
POTASSIUM SERPL-MCNC: 4.1 MMOL/L — SIGNIFICANT CHANGE UP (ref 3.5–5.3)
POTASSIUM SERPL-SCNC: 4.1 MMOL/L — SIGNIFICANT CHANGE UP (ref 3.5–5.3)
PROT SERPL-MCNC: 6.9 G/DL — SIGNIFICANT CHANGE UP (ref 6–8.3)
RBC # BLD: 5.16 M/UL — SIGNIFICANT CHANGE UP (ref 3.8–5.2)
RBC # FLD: 17.9 % — HIGH (ref 10.3–14.5)
SODIUM SERPL-SCNC: 133 MMOL/L — LOW (ref 135–145)
WBC # BLD: 13.73 K/UL — HIGH (ref 3.8–10.5)
WBC # FLD AUTO: 13.73 K/UL — HIGH (ref 3.8–10.5)

## 2022-09-08 PROCEDURE — 93971 EXTREMITY STUDY: CPT | Mod: 26,RT

## 2022-09-08 PROCEDURE — 99233 SBSQ HOSP IP/OBS HIGH 50: CPT | Mod: GC

## 2022-09-08 PROCEDURE — 10060 I&D ABSCESS SIMPLE/SINGLE: CPT | Mod: GC

## 2022-09-08 PROCEDURE — 93926 LOWER EXTREMITY STUDY: CPT | Mod: 26,LT

## 2022-09-08 PROCEDURE — 99232 SBSQ HOSP IP/OBS MODERATE 35: CPT

## 2022-09-08 PROCEDURE — 99232 SBSQ HOSP IP/OBS MODERATE 35: CPT | Mod: GC

## 2022-09-08 PROCEDURE — 99233 SBSQ HOSP IP/OBS HIGH 50: CPT

## 2022-09-08 PROCEDURE — 99221 1ST HOSP IP/OBS SF/LOW 40: CPT | Mod: 25

## 2022-09-08 RX ORDER — LIDOCAINE HCL 20 MG/ML
50 VIAL (ML) INJECTION ONCE
Refills: 0 | Status: DISCONTINUED | OUTPATIENT
Start: 2022-09-08 | End: 2022-09-14

## 2022-09-08 RX ORDER — HEPARIN SODIUM 5000 [USP'U]/ML
1500 INJECTION INTRAVENOUS; SUBCUTANEOUS
Qty: 25000 | Refills: 0 | Status: DISCONTINUED | OUTPATIENT
Start: 2022-09-08 | End: 2022-09-08

## 2022-09-08 RX ORDER — FUROSEMIDE 40 MG
20 TABLET ORAL
Refills: 0 | Status: DISCONTINUED | OUTPATIENT
Start: 2022-09-09 | End: 2022-09-14

## 2022-09-08 RX ORDER — HYDROMORPHONE HYDROCHLORIDE 2 MG/ML
0.5 INJECTION INTRAMUSCULAR; INTRAVENOUS; SUBCUTANEOUS EVERY 4 HOURS
Refills: 0 | Status: DISCONTINUED | OUTPATIENT
Start: 2022-09-08 | End: 2022-09-12

## 2022-09-08 RX ADMIN — HEPARIN SODIUM 15 UNIT(S)/HR: 5000 INJECTION INTRAVENOUS; SUBCUTANEOUS at 12:56

## 2022-09-08 RX ADMIN — HYDROMORPHONE HYDROCHLORIDE 0.5 MILLIGRAM(S): 2 INJECTION INTRAMUSCULAR; INTRAVENOUS; SUBCUTANEOUS at 08:35

## 2022-09-08 RX ADMIN — Medication 100 MILLIGRAM(S): at 05:54

## 2022-09-08 RX ADMIN — HYDROMORPHONE HYDROCHLORIDE 0.5 MILLIGRAM(S): 2 INJECTION INTRAMUSCULAR; INTRAVENOUS; SUBCUTANEOUS at 08:06

## 2022-09-08 RX ADMIN — CHLORHEXIDINE GLUCONATE 1 APPLICATION(S): 213 SOLUTION TOPICAL at 05:53

## 2022-09-08 RX ADMIN — HYDROMORPHONE HYDROCHLORIDE 0.5 MILLIGRAM(S): 2 INJECTION INTRAMUSCULAR; INTRAVENOUS; SUBCUTANEOUS at 20:30

## 2022-09-08 RX ADMIN — PREGABALIN 1000 MICROGRAM(S): 225 CAPSULE ORAL at 12:28

## 2022-09-08 RX ADMIN — Medication 20 MILLIGRAM(S): at 14:29

## 2022-09-08 RX ADMIN — PANTOPRAZOLE SODIUM 40 MILLIGRAM(S): 20 TABLET, DELAYED RELEASE ORAL at 05:54

## 2022-09-08 RX ADMIN — HYDROMORPHONE HYDROCHLORIDE 0.5 MILLIGRAM(S): 2 INJECTION INTRAMUSCULAR; INTRAVENOUS; SUBCUTANEOUS at 04:00

## 2022-09-08 RX ADMIN — HYDROMORPHONE HYDROCHLORIDE 0.5 MILLIGRAM(S): 2 INJECTION INTRAMUSCULAR; INTRAVENOUS; SUBCUTANEOUS at 14:41

## 2022-09-08 RX ADMIN — Medication 100 MILLIGRAM(S): at 14:30

## 2022-09-08 RX ADMIN — Medication 20 MILLIGRAM(S): at 05:54

## 2022-09-08 RX ADMIN — HYDROMORPHONE HYDROCHLORIDE 0.5 MILLIGRAM(S): 2 INJECTION INTRAMUSCULAR; INTRAVENOUS; SUBCUTANEOUS at 20:12

## 2022-09-08 RX ADMIN — HYDROMORPHONE HYDROCHLORIDE 0.5 MILLIGRAM(S): 2 INJECTION INTRAMUSCULAR; INTRAVENOUS; SUBCUTANEOUS at 03:39

## 2022-09-08 RX ADMIN — HYDROMORPHONE HYDROCHLORIDE 0.5 MILLIGRAM(S): 2 INJECTION INTRAMUSCULAR; INTRAVENOUS; SUBCUTANEOUS at 15:05

## 2022-09-08 RX ADMIN — HEPARIN SODIUM 21 UNIT(S)/HR: 5000 INJECTION INTRAVENOUS; SUBCUTANEOUS at 05:15

## 2022-09-08 RX ADMIN — Medication 10 MILLIGRAM(S): at 05:55

## 2022-09-08 RX ADMIN — SPIRONOLACTONE 25 MILLIGRAM(S): 25 TABLET, FILM COATED ORAL at 09:47

## 2022-09-08 NOTE — PROCEDURE NOTE - NSINFORMCONSENT_GEN_A_CORE
Pt/Benefits, risks, and possible complications of procedure explained to patient/caregiver who verbalized understanding and gave verbal consent.
Benefits, risks, and possible complications of procedure explained to patient/caregiver who verbalized understanding and gave verbal consent.
Benefits, risks, and possible complications of procedure explained to patient/caregiver who verbalized understanding and gave verbal consent.
Benefits, risks, and possible complications of procedure explained to patient/caregiver who verbalized understanding and gave written consent.
Pt herself/Benefits, risks, and possible complications of procedure explained to patient/caregiver who verbalized understanding and gave verbal consent.
Benefits, risks, and possible complications of procedure explained to patient/caregiver who verbalized understanding and gave written consent.

## 2022-09-08 NOTE — PROVIDER CONTACT NOTE (OTHER) - BACKGROUND
Pt. says, she takes Atrovent Nebulizer Q4-6HR at home.
Pulmonary HTN
Admitted for right sided heart failure and right atrium thrombus.
pt admitted with sob
Admitted for heart failure exacerbation and right atrium thrombus, s/p thrombectomy.   Arterial duplex scheduled for pt on 9/4 to assess left groin pseudoaneurysm.
Patient admitted for SOB with h/o pulmonary HTN
pt admitted with sob

## 2022-09-08 NOTE — PROGRESS NOTE ADULT - PROBLEM SELECTOR PLAN 4
- likely congestive hepatopathy  - downtrending and improving. - hyperdynamic LVSF, new echogenic mass in RA / clot s/p cath directed thrombus aspiration ib 9/3 c/b L common femoral artery pseudoaneurysm, 9/6 repeat duplex- found +hematoma w/o pseudoaneurysm f/u by vascular sx   - heparin gtt - hyperdynamic LVSF, new echogenic mass in RA / clot s/p cath directed thrombus aspiration ib 9/3 c/b L common femoral artery pseudoaneurysm, 9/6 repeat duplex- found +hematoma w/o pseudoaneurysm f/u by vascular sx   - Holding heparin gtt iso of new LLE hematoma,

## 2022-09-08 NOTE — PROGRESS NOTE ADULT - PROBLEM SELECTOR PLAN 1
Acute on chronic exacerbation of group I/IV pulmonary HTN with RHF  - ipratropium q6 PRN  - HOB > 30 degrees  - sildenafil 20 mg TID  - aldactone 25 mg PO QD  - Treprostinil SQ  - f/u pulm recommendations  - f/u transplant evaluation Acute on chronic exacerbation of group I/IV pulmonary HTN with RHF  - ipratropium q6 PRN  - HOB > 30 degrees  - sildenafil 20 mg TID  - aldactone 25 mg PO QD  - Treprostinil SQ  - f/u pulm recommendations  - f/u lung transplant evaluation

## 2022-09-08 NOTE — PROCEDURE NOTE - NSPOSTCAREGUIDE_GEN_A_CORE
Verbal/written post procedure instructions were given to patient/caregiver
Care for catheter as per unit/ICU protocols
Verbal/written post procedure instructions were given to patient/caregiver
Care for catheter as per unit/ICU protocols

## 2022-09-08 NOTE — PROGRESS NOTE ADULT - PROBLEM SELECTOR PLAN 2
- Staph lugdenesis 8/29 bcx, coagulase-negative staph treated in a similar manner to staph aureus  - Likely source is abscess of the rt subcutaneous port  - F/u BCx (9/7/22)  - MYRIAM planned for 9/8  - Will consult surgery in AM for port removal and drainage of abscess  - Cefazolin 2g q 8 x 4 week duration - Staph lugdunesis 8/29 bcx, coagulase-negative staph treated in a similar manner to staph aureus  - Likely source is abscess of the rt subcutaneous port  - F/u BCx (9/7/22)  - Per anesthesia, MYRIAM is high risk and needs to be done under general anesthesia with intubation. Per pulmonology, patient is high risk given her pulmonary hypertension and is not able to undergo procedure.   - f/u general surgery recommendations for rt abdominal wall abscess  - Cefazolin 2g q 8 x 4 week duration - Staph lugdunesis 8/29 bcx, coagulase-negative staph treated in a similar manner to staph aureus  - Likely source is abscess of the rt subcutaneous port  - F/u BCx (9/7/22)  - Per anesthesia, MYRIAM is high risk and needs to be done under general anesthesia with intubation. Per pulmonology, patient is high risk given her pulmonary hypertension and is not able to undergo procedure.   - f/u general surgery recommendations for rt abdominal wall abscess  - Cefazolin 2g q 8 x 4 week duration  - Ct A&P w/ IV contrast and CT LLE w/ IV Contrast - Staph lugdunesis 8/29 bcx, coagulase-negative staph treated in a similar manner to staph aureus  - Likely source is abscess of the rt subcutaneous port  - F/u BCx (9/7/22)  - Per anesthesia, MYRIAM is high risk and needs to be done under general anesthesia with intubation. Per pulmonology, patient is high risk given her pulmonary hypertension and is not able to undergo procedure.   - Cefazolin 2g q 8 x 4 week duration  - Ct A&P w/ IV contrast and CT LLE w/ IV Contrast  - f/u general surgery recommendations for rt abdominal wall abscess

## 2022-09-08 NOTE — PROGRESS NOTE ADULT - SUBJECTIVE AND OBJECTIVE BOX
Interval History:  Concern for S. Lugdunensis that is positive  and subq infection.       Medications:  acetaminophen     Tablet .. 650 milliGRAM(s) Oral every 6 hours PRN  aluminum hydroxide/magnesium hydroxide/simethicone Suspension 30 milliLiter(s) Oral every 4 hours PRN  artificial  tears Solution 1 Drop(s) Both EYES three times a day PRN  ceFAZolin   IVPB 2000 milliGRAM(s) IV Intermittent every 8 hours  ceFAZolin   IVPB      chlorhexidine 4% Liquid 1 Application(s) Topical <User Schedule>  cyanocobalamin 1000 MICROGram(s) Oral daily  heparin  Infusion 1500 Unit(s)/Hr IV Continuous <Continuous>  HYDROmorphone  Injectable 0.5 milliGRAM(s) IV Push every 4 hours PRN  ipratropium    for Nebulization 500 MICROGram(s) Nebulizer every 6 hours PRN  melatonin 3 milliGRAM(s) Oral at bedtime PRN  ondansetron Injectable 4 milliGRAM(s) IV Push every 8 hours PRN  pantoprazole    Tablet 40 milliGRAM(s) Oral before breakfast  polyethylene glycol 3350 17 Gram(s) Oral every 12 hours  predniSONE   Tablet 10 milliGRAM(s) Oral daily  Remodulin (Treprostinil) 5mG/mL 1 Vial(s) 1 Vial(s) SubCutaneous Continuous Pump  senna 2 Tablet(s) Oral at bedtime  sildenafil (REVATIO) 20 milliGRAM(s) Oral <User Schedule>  sodium chloride 0.65% Nasal 1 Spray(s) Both Nostrils every 6 hours PRN  sodium chloride 0.9% lock flush 10 milliLiter(s) IV Push every 1 hour PRN  spironolactone 25 milliGRAM(s) Oral daily      Vitals:  T(C): 36.4 (22 @ 14:30), Max: 36.8 (22 @ 04:33)  HR: 90 (22 @ 14:30) (85 - 98)  BP: 99/58 (22 @ 14:30) (85/65 - 108/68)  BP(mean): --  RR: 18 (22 @ 14:30) (18 - 20)  SpO2: 99% (22 @ 14:30) (97% - 100%)    Daily     Daily Weight in k.8 (08 Sep 2022 08:59)        I&O's Summary    07 Sep 2022 07:01  -  08 Sep 2022 07:00  --------------------------------------------------------  IN: 885 mL / OUT: 1850 mL / NET: -965 mL        Physical Exam:  Appearance: No Acute Distress  HEENT: PERRL  Neck: improved JVD   Cardiovascular: Normal S1 S2,   Respiratory: CTA anteriorly   Gastrointestinal: Soft, Non-tender	  Skin: No cyanosis	  Neurologic: Non-focal  Extremities: No LE edema  Psychiatry: A & O x 3, Mood & affect appropriate    Labs:                        11.6   13.73 )-----------( 292      ( 08 Sep 2022 04:26 )             37.1     09-08    133<L>  |  97  |  11  ----------------------------<  89  4.1   |  23  |  0.88    Ca    8.9      08 Sep 2022 04:26  Phos  4.2     09-08  Mg     1.8     09-08    TPro  6.9  /  Alb  3.6  /  TBili  0.8  /  DBili  x   /  AST  23  /  ALT  79<H>  /  AlkPhos  60  09-08    PT/INR - ( 07 Sep 2022 00:21 )   PT: 12.4 sec;   INR: 1.08 ratio         PTT - ( 08 Sep 2022 04:26 )  PTT:36.1 sec        TELEMETRY:    Echocardiogram:

## 2022-09-08 NOTE — PROGRESS NOTE ADULT - PROBLEM SELECTOR PLAN 6
DVT Prophylaxis: heparin gtt  Diet: Regular/ NPO @midnight  Dispo: Home with home PT DVT Prophylaxis: heparin gtt  Diet: Regular  Dispo: Home with home PT DVT Prophylaxis: Holding iso expanding hematoma   Diet: Regular  Dispo: Home with home PT

## 2022-09-08 NOTE — PROCEDURE NOTE - NSINDICATIONS_GEN_A_CORE
arterial puncture to obtain ABG's/blood sampling/cannulation purposes/critical patient/monitoring purposes
emergency venous access
abscess
for planned flolan therapy/critical illness/venous access

## 2022-09-08 NOTE — PROVIDER CONTACT NOTE (OTHER) - ACTION/TREATMENT ORDERED:
As per provider hold this dose of Sildenafil
rrt came and gave pt 50mL LR, famotidine, tylenol. ekg and abg and labs drawn. rrt ended 1925
Cont. to monitor pt.
Provider at bedside. Ordered to continue Heparin gtt at 14cc/hr, re-draw PTT and CBC @ 6:30AM, and wean pt off 2L NC to room air. Ordered to notify if pt has another episode of Epistaxis.
will increase the rate
per provider give pt 100mL NS and doing a MICU consult, notify if any changes
see orders

## 2022-09-08 NOTE — PROGRESS NOTE ADULT - SUBJECTIVE AND OBJECTIVE BOX
PULMONARY SERVICE FOLLOW UP CONSULT NOTE    SUBJECTIVE:  Denies chest pain, dyspnea, cough, or wheezing.  No acute complaints.     REVIEW OF SYSTEMS:  All additional ROS negative.    MEDICATIONS:  Pulmonary:  ipratropium    for Nebulization 500 MICROGram(s) Nebulizer every 6 hours PRN    Antimicrobials:  ceFAZolin   IVPB 2000 milliGRAM(s) IV Intermittent every 8 hours  ceFAZolin   IVPB        Anticoagulants:  heparin  Infusion 1500 Unit(s)/Hr IV Continuous <Continuous>    Onc:    GI/:  aluminum hydroxide/magnesium hydroxide/simethicone Suspension 30 milliLiter(s) Oral every 4 hours PRN  pantoprazole    Tablet 40 milliGRAM(s) Oral before breakfast  polyethylene glycol 3350 17 Gram(s) Oral every 12 hours  senna 2 Tablet(s) Oral at bedtime    Endocrine:  predniSONE   Tablet 10 milliGRAM(s) Oral daily    Cardiac:  sildenafil (REVATIO) 20 milliGRAM(s) Oral <User Schedule>  spironolactone 25 milliGRAM(s) Oral daily    Other Medications:  acetaminophen     Tablet .. 650 milliGRAM(s) Oral every 6 hours PRN  artificial  tears Solution 1 Drop(s) Both EYES three times a day PRN  chlorhexidine 4% Liquid 1 Application(s) Topical <User Schedule>  cyanocobalamin 1000 MICROGram(s) Oral daily  HYDROmorphone  Injectable 0.5 milliGRAM(s) IV Push every 4 hours PRN  melatonin 3 milliGRAM(s) Oral at bedtime PRN  ondansetron Injectable 4 milliGRAM(s) IV Push every 8 hours PRN  Remodulin (Treprostinil) 5mG/mL 1 Vial(s) 1 Vial(s) SubCutaneous Continuous Pump  sodium chloride 0.65% Nasal 1 Spray(s) Both Nostrils every 6 hours PRN  sodium chloride 0.9% lock flush 10 milliLiter(s) IV Push every 1 hour PRN      PHYSICAL EXAM  Vital Signs Last 24 Hrs  T(C): 36.8 (08 Sep 2022 04:33), Max: 36.9 (07 Sep 2022 10:45)  T(F): 98.3 (08 Sep 2022 04:33), Max: 98.5 (07 Sep 2022 10:45)  HR: 89 (08 Sep 2022 05:51) (89 - 107)  BP: 105/67 (08 Sep 2022 05:51) (85/65 - 109/66)  BP(mean): --  RR: 20 (08 Sep 2022 04:33) (18 - 20)  SpO2: 99% (08 Sep 2022 04:33) (92% - 100%)    Parameters below as of 08 Sep 2022 04:33  Patient On (Oxygen Delivery Method): room air        09-07 @ 07:01  -  09-08 @ 07:00  --------------------------------------------------------  IN: 885 mL / OUT: 1850 mL / NET: -965 mL            CONSTITUTIONAL: No acute distress.   HEENT:  Conjunctiva clear B/L.  Moist oral mucosa.   Cardiovascular: RRR with no murmurs. No JVD noted. No lower extremity edema B/L. Extremities are warm and well perfused.    Respiratory: Lungs CTAB. No wrr. No accessory muscle use.   Gastrointestinal:  Soft, nontender. Non-distended. Non-rigid.    Neurologic:  Alert and awake. Moving all extremities. Following commands.    Skin:  No gross rashes notes.    LABS:      CBC Full  -  ( 08 Sep 2022 04:26 )  WBC Count : 13.73 K/uL  RBC Count : 5.16 M/uL  Hemoglobin : 11.6 g/dL  Hematocrit : 37.1 %  Platelet Count - Automated : 292 K/uL  Mean Cell Volume : 71.9 fl  Mean Cell Hemoglobin : 22.5 pg  Mean Cell Hemoglobin Concentration : 31.3 gm/dL  Auto Neutrophil # : 9.22 K/uL  Auto Lymphocyte # : 2.92 K/uL  Auto Monocyte # : 1.18 K/uL  Auto Eosinophil # : 0.28 K/uL  Auto Basophil # : 0.07 K/uL  Auto Neutrophil % : 67.2 %  Auto Lymphocyte % : 21.3 %  Auto Monocyte % : 8.6 %  Auto Eosinophil % : 2.0 %  Auto Basophil % : 0.5 %    09-08    133<L>  |  97  |  11  ----------------------------<  89  4.1   |  23  |  0.88    Ca    8.9      08 Sep 2022 04:26  Phos  4.2     09-08  Mg     1.8     09-08    TPro  6.9  /  Alb  3.6  /  TBili  0.8  /  DBili  x   /  AST  23  /  ALT  79<H>  /  AlkPhos  60  09-08    PT/INR - ( 07 Sep 2022 00:21 )   PT: 12.4 sec;   INR: 1.08 ratio         PTT - ( 08 Sep 2022 04:26 )  PTT:36.1 sec                  RADIOLOGY & ADDITIONAL STUDIES:

## 2022-09-08 NOTE — PROGRESS NOTE ADULT - ASSESSMENT
42 yr old female with PulmHTN class I/VI on Treprostinil therapy, Chronic PE, ILD, JULIA who originally presented with palpitations accompanied by orthopnea/N/V/RUQ abd pain over a 2 day period. Course c/b hypotension secondary to acute on chronic cor pulmonale with end organ dysfx (EDOUARD, transaminitis).

## 2022-09-08 NOTE — PROGRESS NOTE ADULT - SUBJECTIVE AND OBJECTIVE BOX
PROGRESS NOTE:   Authored by Alfonso Simons MD   Patient is a 42y old  Female who presents with a chief complaint of Palpitations (08 Sep 2022 07:38)      SUBJECTIVE / OVERNIGHT EVENTS: No acute events overnight. No chest pain,  palpitations, dyspnea, abdominal pain. nausea, vomiting, diarrhea, blurry vision, dysuria, lightheadedness, dizziness.    ADDITIONAL REVIEW OF SYSTEMS:    MEDICATIONS  (STANDING):  ceFAZolin   IVPB 2000 milliGRAM(s) IV Intermittent every 8 hours  ceFAZolin   IVPB      chlorhexidine 4% Liquid 1 Application(s) Topical <User Schedule>  cyanocobalamin 1000 MICROGram(s) Oral daily  heparin  Infusion 1500 Unit(s)/Hr (21 mL/Hr) IV Continuous <Continuous>  pantoprazole    Tablet 40 milliGRAM(s) Oral before breakfast  polyethylene glycol 3350 17 Gram(s) Oral every 12 hours  predniSONE   Tablet 10 milliGRAM(s) Oral daily  Remodulin (Treprostinil) 5mG/mL 1 Vial(s) 1 Vial(s) SubCutaneous Continuous Pump  senna 2 Tablet(s) Oral at bedtime  sildenafil (REVATIO) 20 milliGRAM(s) Oral <User Schedule>  spironolactone 25 milliGRAM(s) Oral daily    MEDICATIONS  (PRN):  acetaminophen     Tablet .. 650 milliGRAM(s) Oral every 6 hours PRN Mild Pain (1 - 3)  aluminum hydroxide/magnesium hydroxide/simethicone Suspension 30 milliLiter(s) Oral every 4 hours PRN Dyspepsia  artificial  tears Solution 1 Drop(s) Both EYES three times a day PRN Dry Eyes  HYDROmorphone  Injectable 0.5 milliGRAM(s) IV Push every 4 hours PRN Pain  ipratropium    for Nebulization 500 MICROGram(s) Nebulizer every 6 hours PRN Bronchospasm  melatonin 3 milliGRAM(s) Oral at bedtime PRN Insomnia  ondansetron Injectable 4 milliGRAM(s) IV Push every 8 hours PRN Nausea and/or Vomiting  sodium chloride 0.65% Nasal 1 Spray(s) Both Nostrils every 6 hours PRN Nasal Congestion  sodium chloride 0.9% lock flush 10 milliLiter(s) IV Push every 1 hour PRN Pre/post blood products, medications, blood draw, and to maintain line patency      CAPILLARY BLOOD GLUCOSE        I&O's Summary    07 Sep 2022 07:01  -  08 Sep 2022 07:00  --------------------------------------------------------  IN: 885 mL / OUT: 1850 mL / NET: -965 mL        PHYSICAL EXAM:  Vital Signs Last 24 Hrs  T(C): 36.8 (08 Sep 2022 04:33), Max: 36.9 (07 Sep 2022 10:45)  T(F): 98.3 (08 Sep 2022 04:33), Max: 98.5 (07 Sep 2022 10:45)  HR: 93 (08 Sep 2022 06:55) (89 - 107)  BP: 92/55 (08 Sep 2022 06:55) (85/65 - 109/66)  BP(mean): --  RR: 20 (08 Sep 2022 04:33) (18 - 20)  SpO2: 99% (08 Sep 2022 04:33) (92% - 100%)    Parameters below as of 08 Sep 2022 04:33  Patient On (Oxygen Delivery Method): room air        GENERAL: No apparent distress.   HEAD:  Atraumatic, Normocephalic  EYES: EOMI, PERRLA, conjunctiva and sclera clear  NECK: Supple, no lymphadenopathy, no JVD  CHEST/LUNG: Clear to auscultation bilateral and symmetric; No wheezes, rales, or rhonchi  HEART: S1 and S2 normal. Regular rate and rhythm; No murmurs, rubs, or gallops  ABDOMEN: Soft, non-tender, non-distended; normal bowel sounds  EXTREMITIES:  2+ peripheral pulses b/l, No clubbing, cyanosis, or edema  NEUROLOGY: A&O x 3, no focal deficits  SKIN: No rashes or lesions    LABS:                        11.6   13.73 )-----------( 292      ( 08 Sep 2022 04:26 )             37.1     09-08    133<L>  |  97  |  11  ----------------------------<  89  4.1   |  23  |  0.88    Ca    8.9      08 Sep 2022 04:26  Phos  4.2     09-08  Mg     1.8     09-08    TPro  6.9  /  Alb  3.6  /  TBili  0.8  /  DBili  x   /  AST  23  /  ALT  79<H>  /  AlkPhos  60  09-08    PT/INR - ( 07 Sep 2022 00:21 )   PT: 12.4 sec;   INR: 1.08 ratio         PTT - ( 08 Sep 2022 04:26 )  PTT:36.1 sec            RADIOLOGY & ADDITIONAL TESTS:  Lab Results Reviewed   Imaging Reviewed  Electrocardiogram Reviewed   PROGRESS NOTE:   Authored by Alfonso Simons MD   Patient is a 42y old  Female who presents with a chief complaint of Palpitations (08 Sep 2022 07:38)      SUBJECTIVE / OVERNIGHT EVENTS: Rt upper arm feels tight at site of IV placement. Left leg pain is worse, abdominal pain with retained port has remained stable. No chest pain,  palpitations, dyspnea, nausea, vomiting, diarrhea, blurry vision, dysuria, lightheadedness, dizziness.    ADDITIONAL REVIEW OF SYSTEMS:    MEDICATIONS  (STANDING):  ceFAZolin   IVPB 2000 milliGRAM(s) IV Intermittent every 8 hours  ceFAZolin   IVPB      chlorhexidine 4% Liquid 1 Application(s) Topical <User Schedule>  cyanocobalamin 1000 MICROGram(s) Oral daily  heparin  Infusion 1500 Unit(s)/Hr (21 mL/Hr) IV Continuous <Continuous>  pantoprazole    Tablet 40 milliGRAM(s) Oral before breakfast  polyethylene glycol 3350 17 Gram(s) Oral every 12 hours  predniSONE   Tablet 10 milliGRAM(s) Oral daily  Remodulin (Treprostinil) 5mG/mL 1 Vial(s) 1 Vial(s) SubCutaneous Continuous Pump  senna 2 Tablet(s) Oral at bedtime  sildenafil (REVATIO) 20 milliGRAM(s) Oral <User Schedule>  spironolactone 25 milliGRAM(s) Oral daily    MEDICATIONS  (PRN):  acetaminophen     Tablet .. 650 milliGRAM(s) Oral every 6 hours PRN Mild Pain (1 - 3)  aluminum hydroxide/magnesium hydroxide/simethicone Suspension 30 milliLiter(s) Oral every 4 hours PRN Dyspepsia  artificial  tears Solution 1 Drop(s) Both EYES three times a day PRN Dry Eyes  HYDROmorphone  Injectable 0.5 milliGRAM(s) IV Push every 4 hours PRN Pain  ipratropium    for Nebulization 500 MICROGram(s) Nebulizer every 6 hours PRN Bronchospasm  melatonin 3 milliGRAM(s) Oral at bedtime PRN Insomnia  ondansetron Injectable 4 milliGRAM(s) IV Push every 8 hours PRN Nausea and/or Vomiting  sodium chloride 0.65% Nasal 1 Spray(s) Both Nostrils every 6 hours PRN Nasal Congestion  sodium chloride 0.9% lock flush 10 milliLiter(s) IV Push every 1 hour PRN Pre/post blood products, medications, blood draw, and to maintain line patency      CAPILLARY BLOOD GLUCOSE        I&O's Summary    07 Sep 2022 07:01  -  08 Sep 2022 07:00  --------------------------------------------------------  IN: 885 mL / OUT: 1850 mL / NET: -965 mL        PHYSICAL EXAM:  Vital Signs Last 24 Hrs  T(C): 36.8 (08 Sep 2022 04:33), Max: 36.9 (07 Sep 2022 10:45)  T(F): 98.3 (08 Sep 2022 04:33), Max: 98.5 (07 Sep 2022 10:45)  HR: 93 (08 Sep 2022 06:55) (89 - 107)  BP: 92/55 (08 Sep 2022 06:55) (85/65 - 109/66)  BP(mean): --  RR: 20 (08 Sep 2022 04:33) (18 - 20)  SpO2: 99% (08 Sep 2022 04:33) (92% - 100%)    Parameters below as of 08 Sep 2022 04:33  Patient On (Oxygen Delivery Method): room air        GENERAL: No apparent distress.   HEAD:  Atraumatic, Normocephalic  EYES: EOMI, PERRLA, conjunctiva and sclera clear  NECK: Supple, no lymphadenopathy, no JVD  CHEST/LUNG: Clear to auscultation bilateral and symmetric; No wheezes, rales, or rhonchi  HEART: S1 and S2 normal. Regular rate and rhythm; No murmurs, rubs, or gallops  ABDOMEN: Soft, non-tender, non-distended; normal bowel sounds. Fluctuance, warmth at RUQ with retained subcutaneous pump attachment, no warmth, erythema, fluctuance at site of subq port on left.  EXTREMITIES: LLE hematoma near groin, TTP. 2+ peripheral pulses b/l, No clubbing, cyanosis, or edema  NEUROLOGY: A&O x 3, no focal deficits  SKIN: No rashes or lesions    LABS:                        11.6   13.73 )-----------( 292      ( 08 Sep 2022 04:26 )             37.1     09-08    133<L>  |  97  |  11  ----------------------------<  89  4.1   |  23  |  0.88    Ca    8.9      08 Sep 2022 04:26  Phos  4.2     09-08  Mg     1.8     09-08    TPro  6.9  /  Alb  3.6  /  TBili  0.8  /  DBili  x   /  AST  23  /  ALT  79<H>  /  AlkPhos  60  09-08    PT/INR - ( 07 Sep 2022 00:21 )   PT: 12.4 sec;   INR: 1.08 ratio         PTT - ( 08 Sep 2022 04:26 )  PTT:36.1 sec            RADIOLOGY & ADDITIONAL TESTS:  Lab Results Reviewed   Imaging Reviewed  Electrocardiogram Reviewed

## 2022-09-08 NOTE — PROGRESS NOTE ADULT - ATTENDING COMMENTS
noted to have positive blood culture again, ID consulted and on IV abx, likely source from the SQ remodulin site.   appears euvolemic  can resume lasix 20mg every otherday, pls hold if rise in bun/creat or concerns for hypovolemia given poor intake  cont rest of her meds  pre transplant w/o per lung transplant team  will follow as needed

## 2022-09-08 NOTE — PROVIDER CONTACT NOTE (OTHER) - SITUATION
RUE swollen on morning assessment, MD made aware. Ok to remove RUE Arrow, second Arrow leaking at site removed. 2 attempts were made to place IV, but was unsuccessful, patient refused another attempt.
BP 89/54- patient to receive Sildenafil
Pt had small amount of epistaxis. Pt on Heparin gtt at 14cc/hr.
pt hypotensive
pt on heparin drip at 12cc/hr for PE pt on pt specific normogram
PTT 75.1, pt on Heparin gtt infusing @ 14cc/hr. Pt has no S&S of active bleeding noted. Pt hemodynamically stable.
rrt called for pt with cp, blurry vision and diaphoretic

## 2022-09-08 NOTE — PROGRESS NOTE ADULT - SUBJECTIVE AND OBJECTIVE BOX
Lung Transplant Progress Note  =====================================================  HPI:  42F PMH PE on xarelto, pHTN, ILD, JULIA, right sided heart failure, presents to the hospital for 2 days of palpitations. Patient reports she has had similar episodes of palpitations in the past, however workup in the past was unremarkable. Patient also reporting mild orthopnea. Denies chest pain. Denies diaphoresis. Denies recent use of stimulants including caffeine. Patient also reporting severe nausea and vomiting which has resolved with zofran in the ED. ROS otherwise unremarkable.    ED course: VS with tachycardia. CBC with anemia. CMP normal. BNP 7646. Troponin T normal. EKG with sinus tachycardia, CXR without consolidation. CT chest noncon revealed pulmonary artery dilations likely secondary to pulmonary hypertension. Patient now for admission to medicine for further evaluation and treatment.  (28 Aug 2022 15:36)      24 hour events: patient states she mobilized with PT yesterday    PAST MEDICAL & SURGICAL HISTORY:  Tachycardia      Anemia      Pulmonary hypertension      Pulmonary embolism      Asthma  On home O2 nightly at 2L via NC      PE (pulmonary thromboembolism)  on Xarelto 10 mg daily      Sinusitis      Corneal disorder      Right heart failure      CAD (coronary artery disease)      H/O pulmonary hypertension      Pulmonary embolism      Asthma      History of tachycardia      On supplemental oxygen by nasal cannula  O2 @ 2L      Pacemaker      Skin mass  left upper thigh mass      History of tubal ligation      Pacemaker      H/O tubal ligation      History of pacemaker  12/19 - [employer] model Ingevity 4469        Home Meds: Home Medications:  Atrovent 500 mcg/2.5 mL inhalation solution: 2.5 milliliter(s) inhaled , As Needed (29 Aug 2022 12:31)  Atrovent HFA 17 mcg/inh inhalation aerosol: puff(s) inhaled , As Needed (29 Aug 2022 12:31)  Lasix 20 mg oral tablet: 1 tab(s) orally every other day (29 Aug 2022 12:31)  omeprazole 40 mg oral delayed release capsule: 1 cap(s) orally once a day (29 Aug 2022 12:31)  predniSONE: 10 milligram(s) orally once a day (29 Aug 2022 12:31)  Remodulin 5 mg/mL injectable solution: patient is taking 42ng/kg/min via her own SQ PUMP (29 Aug 2022 12:31)  sildenafil 20 mg oral tablet: 0.5 tab(s) orally once a day (29 Aug 2022 12:31)  Vitamin B12 1000 mcg oral tablet: 1 tab(s) orally once a day (29 Aug 2022 12:31)  Xarelto 10 mg oral tablet: 1 tab(s) orally once a day (29 Aug 2022 12:31)    Allergies: Allergies    adhesives (Rash)  penicillin (Rash)  vancomycin (Rash)    Intolerances    albuterol (Other; Rash)    Soc:   Advanced Directives: Presumed Full Code     ROS:    REVIEW OF SYSTEMS    [xx ] A ten-point review of systems was otherwise negative except as noted.  [ ] Due to altered mental status/intubation, subjective information were not able to be obtained from the patient. History was obtained, to the extent possible, from review of the chart and collateral sources of information.      CURRENT MEDICATIONS:   --------------------------------------------------------------------------------------  Neurologic Medications  acetaminophen     Tablet .. 650 milliGRAM(s) Oral every 6 hours PRN Mild Pain (1 - 3)  HYDROmorphone  Injectable 0.5 milliGRAM(s) IV Push every 4 hours PRN Pain  melatonin 3 milliGRAM(s) Oral at bedtime PRN Insomnia  ondansetron Injectable 4 milliGRAM(s) IV Push every 8 hours PRN Nausea and/or Vomiting    Respiratory Medications  ipratropium    for Nebulization 500 MICROGram(s) Nebulizer every 6 hours PRN Bronchospasm    Cardiovascular Medications  sildenafil (REVATIO) 20 milliGRAM(s) Oral <User Schedule>  spironolactone 25 milliGRAM(s) Oral daily    Gastrointestinal Medications  aluminum hydroxide/magnesium hydroxide/simethicone Suspension 30 milliLiter(s) Oral every 4 hours PRN Dyspepsia  cyanocobalamin 1000 MICROGram(s) Oral daily  pantoprazole    Tablet 40 milliGRAM(s) Oral before breakfast  polyethylene glycol 3350 17 Gram(s) Oral every 12 hours  senna 2 Tablet(s) Oral at bedtime  sodium chloride 0.9% lock flush 10 milliLiter(s) IV Push every 1 hour PRN Pre/post blood products, medications, blood draw, and to maintain line patency    Genitourinary Medications    Hematologic/Oncologic Medications    Antimicrobial/Immunologic Medications  ceFAZolin   IVPB 2000 milliGRAM(s) IV Intermittent every 8 hours  ceFAZolin   IVPB        Endocrine/Metabolic Medications  predniSONE   Tablet 10 milliGRAM(s) Oral daily    Topical/Other Medications  artificial  tears Solution 1 Drop(s) Both EYES three times a day PRN Dry Eyes  chlorhexidine 4% Liquid 1 Application(s) Topical <User Schedule>  Remodulin (Treprostinil) 5mG/mL 1 Vial(s) 1 Vial(s) SubCutaneous Continuous Pump  sodium chloride 0.65% Nasal 1 Spray(s) Both Nostrils every 6 hours PRN Nasal Congestion    --------------------------------------------------------------------------------------    VITAL SIGNS, INS/OUTS (last 24 hours):  --------------------------------------------------------------------------------------  ICU Vital Signs Last 24 Hrs  T(C): 36.7 (08 Sep 2022 16:03), Max: 36.8 (08 Sep 2022 04:33)  T(F): 98 (08 Sep 2022 16:03), Max: 98.3 (08 Sep 2022 04:33)  HR: 90 (08 Sep 2022 16:03) (85 - 98)  BP: 99/58 (08 Sep 2022 14:30) (85/65 - 108/68)  BP(mean): --  ABP: --  ABP(mean): --  RR: 18 (08 Sep 2022 16:03) (18 - 20)  SpO2: 99% (08 Sep 2022 16:03) (97% - 100%)    O2 Parameters below as of 08 Sep 2022 16:03  Patient On (Oxygen Delivery Method): room air          I&O's Summary    07 Sep 2022 07:01  -  08 Sep 2022 07:00  --------------------------------------------------------  IN: 885 mL / OUT: 1850 mL / NET: -965 mL    08 Sep 2022 07:01  -  08 Sep 2022 18:38  --------------------------------------------------------  IN: 400 mL / OUT: 0 mL / NET: 400 mL      --------------------------------------------------------------------------------------    EXAM:  General/Neuro   Exam: Normal, NAD, alert, oriented x 3, no focal deficits.     Respiratory  Exam: Lungs clear auscultation, Normal expansion/effort.  on 2 L NC    Cardiovascular  Exam: S1, S2.  Regular rate and rhythm.    GI  Exam: Abdomen soft, Non-tender, Non-distended.      Tubes/Lines/Drains    [x] Peripheral IV    Extremities  Exam: Extremities warm, pink, well-perfused.      Derm:  Exam: Good skin turgor, no skin breakdown.      :   Exam: prima fit in place    LABS  --------------------------------------------------------------------------------------  Labs:  CAPILLARY BLOOD GLUCOSE                              11.6   13.73 )-----------( 292      ( 08 Sep 2022 04:26 )             37.1       Auto Neutrophil %: 67.2 % (09-08-22 @ 04:26)  Auto Immature Granulocyte %: 0.4 % (09-08-22 @ 04:26)    09-08    133<L>  |  97  |  11  ----------------------------<  89  4.1   |  23  |  0.88      Calcium, Total Serum: 8.9 mg/dL (09-08-22 @ 04:26)      LFTs:             6.9  | 0.8  | 23       ------------------[60      ( 08 Sep 2022 04:26 )  3.6  | x    | 79          Lipase:x      Amylase:x         Blood Gas Venous - Lactate: 0.7 mmol/L (09-07-22 @ 02:25)  Blood Gas Venous - Lactate: 1.0 mmol/L (09-07-22 @ 00:05)  Blood Gas Venous - Lactate: 2.0 mmol/L (09-06-22 @ 10:05)  Blood Gas Venous - Lactate: 0.7 mmol/L (09-06-22 @ 00:05)    ABG - ( 04 Sep 2022 23:03 )  pH: 7.52  /  pCO2: 36    /  pO2: 82    / HCO3: 29    / Base Excess: 6.3   /  SaO2: 97.1            ABG - ( 04 Sep 2022 12:33 )  pH: 7.51  /  pCO2: 34    /  pO2: 79    / HCO3: 27    / Base Excess: 4.2   /  SaO2: 97.9            ABG - ( 03 Sep 2022 12:29 )  pH: 7.50  /  pCO2: 39    /  pO2: 113   / HCO3: 30    / Base Excess: 6.7   /  SaO2: 99.5              Coags:     x      ----< x       ( 08 Sep 2022 04:26 )     36.1            Serum Pro-Brain Natriuretic Peptide: 71 pg/mL (09-02-22 @ 00:22)            --------------------------------------------------------------------------------------    OTHER LABS    IMAGING RESULTS

## 2022-09-08 NOTE — CONSULT NOTE ADULT - CONSULT REASON
Induration around R subcutaneous injection port
clot in transit
pulmonary HTN
Ken lynch
pre lung transplant eval
Hypotension
Hypotension
PHTN
Left femoral PSA
S/p catheter - directed thrombus aspiration

## 2022-09-08 NOTE — PROCEDURE NOTE - NSPROCNAME_GEN_A_CORE
Central Line Insertion
Arterial Puncture/Cannulation
General
Central Line Insertion
Arterial Puncture/Cannulation
Incision & Drainage

## 2022-09-08 NOTE — PROVIDER CONTACT NOTE (OTHER) - DATE AND TIME:
07-Sep-2022 09:49
04-Sep-2022 01:00
29-Aug-2022 10:00
07-Sep-2022 22:45
04-Sep-2022 05:20
08-Sep-2022 17:00
28-Aug-2022 19:00
28-Aug-2022 21:00

## 2022-09-08 NOTE — CONSULT NOTE ADULT - ASSESSMENT
42 yr old female with PulmHTN class I/VI on Treprostinil therapy, Chronic PE, ILD, JULIA who originally presented with palpitations accompanied by orthopnea/N/V/RUQ abd pain over a 2 day period. Course c/b hypotension secondary to acute on chronic cor pulmonale with end organ dysfx (EDOUARD, transaminitis).    General surgery consulted for induration around R subcutaneous injection port. Pt said the R site was painful and placed another subQ port on her left side.    PLAN  -       To be discussed with Dr. Gregg    Acute Care Surgery  [id number]   42 yr old female with PulmHTN class I/VI on Treprostinil therapy, Chronic PE, ILD, JULIA who originally presented with palpitations accompanied by orthopnea/N/V/RUQ abd pain over a 2 day period. Course c/b hypotension secondary to acute on chronic cor pulmonale with end organ dysfx (EDOUARD, transaminitis).    General surgery consulted for induration around R subcutaneous injection port. Pt said the R site was painful and placed another subQ port on her left side.    PLAN  - Bedside I&D  - Care per primary team    Dicussed with Dr. Gregg    Acute Care Surgery  [id number]

## 2022-09-08 NOTE — PROGRESS NOTE ADULT - PROBLEM SELECTOR PLAN 3
- hyperdynamic LVSF, new echogenic mass in RA / clot s/p cath directed thrombus aspiration ib 9/3 c/b L common femoral artery pseudoaneurysm, 9/6 repeat duplex- found +hematoma w/o pseudoaneurysm f/u by vascular sx   - heparin gtt - hematoma of left groin, no evidence of pseudoaneurysm.  - Left groin hematoma expanding in size, and new superficial hematoma measuring 3.7x 5.7 x 1.9 cm.   - Per vascular no indication for intervention at this time as no signs of infection or nerve palsies present.   - Will continue to monitor  - CT A&P w/ IV contrast and CT LLE w/ IV contrast.   - Will monitor H/H - hematoma of left groin, no evidence of pseudoaneurysm.  - Left groin hematoma expanding in size, and new superficial hematoma measuring 3.7x 5.7 x 1.9 cm.   - Per vascular no indication for intervention at this time as no signs of infection or nerve palsies present.   - Holding anticoagulation at this time given new hematoma in LLE and expansion of existing hematoma. Risks of bleeding currently outweigh benefits at this time. Will continue to reevaluate need for anticoagulation given patient's atrial thrombus and chronic pulmonary embolism. Patient has no new active PE from the last CT-A.   - Will continue to monitor hematoma  - CT A&P w/ IV contrast and CT LLE w/ IV contrast.   - Will monitor H/H

## 2022-09-08 NOTE — CONSULT NOTE ADULT - CONSULT REQUESTED DATE/TIME
07-Sep-2022 07:37
29-Aug-2022 16:26
01-Sep-2022 11:35
31-Aug-2022 14:35
02-Sep-2022 15:02
07-Sep-2022 11:11
08-Sep-2022 16:24
28-Aug-2022 23:29
07-Sep-2022 07:56
29-Aug-2022 17:42

## 2022-09-08 NOTE — PROVIDER CONTACT NOTE (OTHER) - NAME OF MD/NP/PA/DO NOTIFIED:
NP Natasha
lyn leonardo
Dr. Hunt
RAEGAN Mccann
Dr Simons team 3 resident
acp
Dr. Maame Barone
MD Simons

## 2022-09-08 NOTE — PROGRESS NOTE ADULT - ASSESSMENT
42 yr old female with stated hx significant for PulmHTN class I/VI on Treprostinil therapy, Chronic PE who originally presented with palpitations accompanied by orthopnea/N/V/RUQ abd pain over a 2 day period. Course c/b hypotension secondary to acute on chronic cor pulmonale with end organ dysfx (EDOUARD, transaminitis), and right atrial clot requiring catheter guided thrombectomy, as well as thrombin injection of right femoral pseudoaneurysm.     Patient found to have up-trending white count with high of 16 this AM. One set of blood cultures from 8/29 positive for staph lugdunensis. TTE from 8/29 and 8/31 identified no vegetations. Repeat cultures from 8/31 NGTD. Central venous line and A-line placed on 9/2, after positive culture. Patient with catheter directed thrombectomy on 8/31.   Patient with hx of PPM placed in 2016. Continuous Treprostinil pumped removed from site in RLQ following increased tenderness and swelling approximately 3 days ago, with patient re-implanting infusion pump in LLQ on her own.     Patient with positive culture of Staph Lugdunensis, which is typically pathogenic and not a contaminant, i/s/o worsening sub-q and skin infection with likely underlying abscess from previous Remodulin placement site. Patient empirically treated with course of aztreonam 2000mg BID from 8/29-9/1 out of concern for sepsis contributing to hypotension i/s/o elevated white count, but this would have little coverage for Staph Lugdunensis.       overall bacteremia, cellulitis and abscess, leucocytosis, tachycardia, sepsis, allergy to multiple abx.   pseudoaneurysm, s/p coiling.       PLAN:   - remove remaining portion from infusion pump  - drain underlying abscess  - repeat blood cultures in lab   - c/w cefazolin 2 gm iv q8h, tolerating it well.   - would hold off on MYRIAM given concern for need for intubation, after discussion with pulm would hold off.   - recommend CT lt groin with contrast (pelvis/upper LE) to r/o superinfection of hematoma  - trend cbc for leucocytosis     Plan discussed with medicine and pulm.     Trevor Grover  Please contact through MS Teams   If no response or past 5 pm/weekend call [phone].

## 2022-09-08 NOTE — PROCEDURE NOTE - NSLEVEL_SKIN_A_CORE
subcutaneous foot and lower leg infection will start tx clinda and call podiatry for eval and anticipte pt will need admission

## 2022-09-08 NOTE — PROGRESS NOTE ADULT - ASSESSMENT
42F PMHx of pulmonary HTN (group I and group IV; on Treprostinil and Xarelto), home o2 (2L), ILD, JULIA, obesity, and PPM presents on 8/28 for pain, nausea and vomiting, found to be in profound RV failure s/p dobutamine, Marcela, and aggressive diuresis. Patient also noted to have RA clot in transit on TTE s/p mechanical thrombectomy which was complicated by left groin pseudoaneurysm s/p thrombin injection. Course complicated by EDOUARD likely cardiorenal which has improved.    TTE done which showed hyperdynamic and compressed LV, severe RV dysfunction, mod TR, dilated IVC. Repeat TTE with improved RV function however RA thrombus noted.   CTA:  marked enlargement of PA. No PE. B/L GGOs.     Recommendations  -CW Sildenafil 20 mg po tid.   -CW Remodulin SQ pump at 42 ng/kg/min     -CW chronic prednisone at 10 mg po daily.   -Pt of Dr. Yoon. Will discuss.   -CW ipratropium prn.   -Diuretic mgmt per HF.   -Being evaluated by transplant team.   -Aggressive physical therapy. PT/OT consult. PM&R. Out of bed to chair daily.   -Optimize nutrition. Consult nutrition.  -Of note, patient with positive blood culture on 8/29 growing Staph Lugdunensus which is considered to be a pathologic CoNS. Patient also w/ cardiac device and Remodulin pump. Would obtain ID opinion.   -All teams contribution to care greatly appreciated     Dr. Ramesh Echevarria,   Pulmonary and Critical Care Medicine Fellow   Available via [employer] Teams - **Preferred**  Pulmonary Spectra #[id number] ([facility]) / [phone] ([facility])  Pager:  [phone]   42F PMHx of pulmonary HTN (group I and group IV; on Treprostinil and Xarelto), home o2 (2L), ILD, JULIA, obesity, and PPM presents on  for pain, nausea and vomiting, found to be in profound RV failure s/p dobutamine, Marcela, and aggressive diuresis. Patient also noted to have RA clot in transit on TTE s/p mechanical thrombectomy which was complicated by left groin pseudoaneurysm s/p thrombin injection. Course complicated by EDOUARD likely cardiorenal which has improved.    TTE done which showed hyperdynamic and compressed LV, severe RV dysfunction, mod TR, dilated IVC. Repeat TTE with improved RV function however RA thrombus noted.   CTA:  marked enlargement of PA. No PE. B/L GGOs.     Recommendations  -CW Sildenafil 20 mg po tid.   -CW Remodulin SQ pump at 42 ng/kg/min     -CW chronic prednisone at 10 mg po daily.   -Pt of Dr. Yoon.   -CW ipratropium prn.   -Diuretic mgmt per HF.   -Being evaluated by transplant team.   -Aggressive physical therapy. PT/OT consult. PM&R. Out of bed to chair daily.   -Optimize nutrition. Consult nutrition.  -Prior port site at RLQ to be evaluated by surgery given Staph Lugdunensus bacteremia. Given PPM, will also need MYRIAM per ID.     -All teams contribution to care greatly appreciated    Patient should f/u with pulmonary, Dr. Yoon (pulmonary HTN) within 1-2 weeks of discharge. Please email [email or web address] and include patient's name,  and MRN and allow 24 hours for scheduling.    [address] [place] [address]  [phone]     Dr. Ramesh Echevarria, DO  Pulmonary and Critical Care Medicine Fellow   Available via Microsoft Teams - **Preferred**  Pulmonary Spectra #[id number] ([facility]) / [id number] ([facility])  Pager:  [phone]   42F PMHx of pulmonary HTN (group I and group IV; on Treprostinil and Xarelto), home o2 (2L), ILD, JULIA, obesity, and PPM presents on  for pain, nausea and vomiting, found to be in profound RV failure s/p dobutamine, Marcela, and aggressive diuresis. Patient also noted to have RA clot in transit on TTE s/p mechanical thrombectomy which was complicated by left groin pseudoaneurysm s/p thrombin injection. Course complicated by EDOUARD likely cardiorenal which has improved.    TTE done which showed hyperdynamic and compressed LV, severe RV dysfunction, mod TR, dilated IVC. Repeat TTE with improved RV function however RA thrombus noted.   CTA:  marked enlargement of PA. No PE. B/L GGOs.     Recommendations  -CW Sildenafil 20 mg po tid.   -CW Remodulin SQ pump at 42 ng/kg/min     -CW chronic prednisone at 10 mg po daily.   -Pt of Dr. Yoon.   -CW ipratropium prn.   -Diuretic mgmt per HF.   -Being evaluated by transplant team.   -Aggressive physical therapy. PT/OT consult. PM&R. Out of bed to chair daily.   -Optimize nutrition. Consult nutrition.  -Prior port site at RLQ to be evaluated by surgery given Staph Lugdunensus bacteremia.    -All teams contribution to care greatly appreciated    Patient should f/u with pulmonary, Dr. Yoon (pulmonary HTN) within 1-2 weeks of discharge. Please email [email or web address] and include patient's name,  and MRN and allow 24 hours for scheduling.    [address] [place] [address]  [phone]     Dr. Ramesh Echevarria, DO  Pulmonary and Critical Care Medicine Fellow   Available via Microsoft Teams - **Preferred**  Pulmonary Spectra #[id number] ([facility]) / [id number] ([facility])  Pager:  [phone]

## 2022-09-08 NOTE — PROGRESS NOTE ADULT - PROBLEM SELECTOR PLAN 6
-Vascular surgery following, repeated US 9/ No evidence of pseudoaneurysm within the left groin.   - Pseudoaneurysm of R femoral site Following thrombin injection the pseudoaneurysm is thrombosed. There remains a small nubbin communicating with the left common femoral artery.

## 2022-09-08 NOTE — PROGRESS NOTE ADULT - ATTENDING COMMENTS
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 EDOUARD likely secondary to venous congestion- resolved.     Now transferred out of the ICU.     Staph lugdunensis bacteremia- continue Ancef. Subcutaneous abscess secondary to device- I&D done today.     High risk for MYRIAM- deferred.     Worsening groin hematoma- heparin held.     Continue to monitor.

## 2022-09-08 NOTE — CONSULT NOTE ADULT - ATTENDING COMMENTS
seen and examined 09-09-22 @ 1915    Insuflon subcutaneous catheter in RUQ since 9/3  has become painful, so patient placed another catheter in the LLQ which is currently being used for treprostinil infusion.    afeb  significant tenderness and induration around the RUQ subcutaneous catheter suspicious for abscess    WBC = 13      abscess secondary to infected subcutaneous catheter insertion site  -She provided written informed consent for removal of the catheter, aspiration and possible I&D under local anesthesia.  -pus and blood drained from a 4 cm abscess cavity and was sent for culture x 2  -will remove gauze packing tomorrow seen and examined 09-08-22 @ 1915    Insuflon subcutaneous catheter in RUQ since 9/3  has become painful, so patient placed another catheter in the LLQ which is currently being used for treprostinil infusion.    afeb  significant tenderness and induration around the RUQ subcutaneous catheter suspicious for abscess    WBC = 13      abscess secondary to infected subcutaneous catheter insertion site  -She provided written informed consent for removal of the catheter, aspiration and possible I&D under local anesthesia.  -pus and blood drained from a 4 cm abscess cavity and was sent for culture x 2  -will remove gauze packing tomorrow

## 2022-09-08 NOTE — PROCEDURE NOTE - ADDITIONAL PROCEDURE DETAILS
42 yr old female with PMHx PulmHTN on Treprostinil, cor pulmonale, PPM, Chronic PE, Asthma, who presented with acute on chronic cor pulmonale. Rt IJ TLC placed for planned Flolan therapy
42 yr old female with PMHx PulmHTN on Treprostinil, cor pulmonale, PPM, Chronic PE, Asthma, who presented with acute on chronic cor pulmonale. A line replaced in Lt radial artery for pressure monitoring with medication titration
4 cm abscess cavity in the subcutaneous tissue  gauze packing placed

## 2022-09-08 NOTE — CHART NOTE - NSCHARTNOTEFT_GEN_A_CORE
Nutrition Follow Up Note  Patient seen for consult for nutrition assessment/education     Chart reviewed adnd events noted. Per chart "42 yr old female with PulmHTN class I/VI on Treprostinil therapy, Chronic PE, ILD, JULIA who originally presented with palpitations accompanied by orthopnea/N/V/RUQ abd pain over a 2 day period. Course c/b hypotension secondary to acute on chronic cor pulmonale with end organ dysfx (EDOUARD, transaminitis)."     Source: [x] Patient       [x] Medical Record         Diet, Regular:   Supplement Feeding Modality:  Oral  Ensure Enlive Cans or Servings Per Day:  1       Frequency:  Daily (22 @ 14:45) [Active]    Is current diet order appropriate/adequate: [x] Yes: does not tolerate oral nutritional supplement     PO intake :   [] >75%  Adequate    [x] 50-75%  Fair       [] <50%  Poor      Nutrition-related concerns:  - Pt reports fair appetite/PO intake, ~50-75% of meals consumed.   - Does not tolerate Ensure Shakes, would like to trial Orgain 1x/day  - Continues on Vitamin B12, Prednisone (BG controlled)  - K+ Phos and Mg WNL today    - Transplant team following for lung transplant pre-evaluation     GI:   - No GI distress reported  - Last BM .   Bowel Regimen: Miralax, Senna    Dosing wt in k.3 ()  Ht: 165.1 cm  BMI: 38.3 kG/m2  Daily Weight in k () --> 93.8 (), Weight in k (), Weight in k (), Weight in k.4 (), Weight in k.9 ()  * Weight trending downward since admission in setting of IV fluids, diuretics - Weight changes may be partially due to fluid shifts. RD to continue to monitor weight trends as able.     Nutritionally Pertinent MEDICATIONS  (STANDING):  ceFAZolin   IVPB  ceFAZolin   IVPB  cyanocobalamin  pantoprazole    Tablet  polyethylene glycol 3350  predniSONE   Tablet  senna  sildenafil (REVATIO)  spironolactone    Pertinent Labs:  @ 04:26: Na 133<L>, BUN 11, Cr 0.88, BG 89, K+ 4.1, Phos 4.2, Mg 1.8, Alk Phos 60, ALT/SGPT 79<H>, AST/SGOT 23, HbA1c -    Skin per nursing documentation: No pressure injuries noted.  Edema per nursing documentation: None noted.     Estimated Nutrient Needs:  [X] No change from previous assessment   - Energy: 6702-5304 jerry/day (20-24 jerry/kg) ** Based on bed scale weight  95.2 kg   - Protein:  Gm/day (1.6-2.0 Gm/kg) ** Based on IBW 56.6 kG  - Fluid: Defer to team     Previous Nutrition Diagnosis: Mild acute malnutrition, Overweight/obesity   Nutrition Diagnosis is: [x] ongoing  - being addressed with PO intake, oral nutritional supplements, food preferences     New Nutrition Diagnosis: n/a     Nutrition Care Plan:  [x] In Progress  [] Achieved  [] Not applicable    Nutrition Interventions:     Education Provided:       [x] Yes:  [] No: Discussed heart healthy nutrition therapy recommendations including: dietary sources of sodium and saturated fat to limit/avoid, tips to decrease intake of sodium/saturated fat, increasing intake of lean proteins and fiber rich foods (i.e. whole grains/vegetables/fruits), benefits of incorporating plant foods into the diet, MyPlate method. Pt endorses understanding, able to teach back nutrition education points. Declined literature at this time (reports she has at home).     Recommendations:      1) Change diet order: Low sodium. Monitor PO intake for appropriateness of DASH/TLC diet.   2) Discontinue Ensure Enlive 1x. Provider to RN "Pt allowed Orgain 1x/day", RD will provide.   3) Continue micronutrient supplementation: Vitamin B12 (unless contraindicated).   4) Diet education reviewed, reinforce as needed.     Monitoring and Evaluation:   Continue to monitor nutritional intake, tolerance to diet prescription, weights, labs, skin integrity    RD remains available upon request and will follow up per protocol  Aurea White RDN [facility] #[phone] Nutrition Follow Up Note  Patient seen for consult for nutrition assessment/education     Chart reviewed adnd events noted. Per chart "42 yr old female with PulmHTN class I/VI on Treprostinil therapy, Chronic PE, ILD, JULIA who originally presented with palpitations accompanied by orthopnea/N/V/RUQ abd pain over a 2 day period. Course c/b hypotension secondary to acute on chronic cor pulmonale with end organ dysfx (EDOUARD, transaminitis)."     Source: [x] Patient       [x] Medical Record         Diet, Regular:   Supplement Feeding Modality:  Oral  Ensure Enlive Cans or Servings Per Day:  1       Frequency:  Daily (22 @ 14:45) [Active]    Is current diet order appropriate/adequate: [x] Yes: does not tolerate oral nutritional supplement     PO intake :   [] >75%  Adequate    [x] 50-75%  Fair       [] <50%  Poor      Nutrition-related concerns:  - Pt reports fair appetite/PO intake, ~50-75% of meals consumed.   - Does not tolerate Ensure Shakes, would like to trial Orgain 1x/day  - Continues on Vitamin B12, Prednisone (BG controlled)  - K+ Phos and Mg WNL today    - Transplant team following for lung transplant pre-evaluation     GI:   - No GI distress reported  - Last BM .   Bowel Regimen: Miralax, Senna    Dosing wt in k.3 ()  Ht: 165.1 cm  BMI: 38.3 kG/m2  Daily Weight in k () --> 93.8 (), Weight in k (), Weight in k (), Weight in k.4 (), Weight in k.9 ()  * Weight trending downward since admission in setting of IV fluids, diuretics - Weight changes may be partially due to fluid shifts. RD to continue to monitor weight trends as able.     Nutritionally Pertinent MEDICATIONS  (STANDING):  ceFAZolin   IVPB  ceFAZolin   IVPB  cyanocobalamin  pantoprazole    Tablet  polyethylene glycol 3350  predniSONE   Tablet  senna  sildenafil (REVATIO)  spironolactone    Pertinent Labs:  @ 04:26: Na 133<L>, BUN 11, Cr 0.88, BG 89, K+ 4.1, Phos 4.2, Mg 1.8, Alk Phos 60, ALT/SGPT 79<H>, AST/SGOT 23, HbA1c -    Skin per nursing documentation: No pressure injuries noted.  Edema per nursing documentation: None noted.     Estimated Nutrient Needs:  [X] No change from previous assessment   - Energy: 6566-7347 jerry/day (20-24 jerry/kg) ** Based on bed scale weight  95.2 kg   - Protein:  Gm/day (1.6-2.0 Gm/kg) ** Based on IBW 56.6 kG  - Fluid: Defer to team     Previous Nutrition Diagnosis: Mild acute malnutrition, Overweight/obesity   Nutrition Diagnosis is: [x] ongoing  - being addressed with PO intake, oral nutritional supplements, food preferences     New Nutrition Diagnosis: n/a     Nutrition Care Plan:  [x] In Progress  [] Achieved  [] Not applicable    Nutrition Interventions:     Education Provided:       [x] Yes:  [] No: Discussed heart healthy nutrition therapy recommendations including: dietary sources of sodium and saturated fat to limit/avoid, tips to decrease intake of sodium/saturated fat, increasing intake of lean proteins and fiber rich foods (i.e. whole grains/vegetables/fruits), benefits of incorporating plant foods into the diet, MyPlate method. Pt endorses understanding, able to teach back nutrition education points. Declined literature at this time (reports she has at home).     Recommendations:      1) Continue current diet as ordered/tolerated: Regular. Consider DASH/TLC diet as PO intake improves.   2) Discontinue Ensure Enlive 1x. Provider to RN "Pt allowed Orgain 1x/day", RD will provide.   3) Continue micronutrient supplementation: Vitamin B12 (unless contraindicated).   4) Diet education reviewed, reinforce as needed.     Monitoring and Evaluation:   Continue to monitor nutritional intake, tolerance to diet prescription, weights, labs, skin integrity    RD remains available upon request and will follow up per protocol  JUDITH Murguia [facility] #[phone]

## 2022-09-08 NOTE — PROVIDER CONTACT NOTE (OTHER) - ASSESSMENT
Pt had small amount of epistaxis. Pt on Heparin gtt at 14cc/hr. Pt hemodynamically stable.
pt axox4
PTT 75.1, pt on Heparin gtt infusing @ 14cc/hr. Pt has no S&S of active bleeding noted. Pt hemodynamically stable.
VK=484 ST on tele, RR 21, POX=96% 4litnc. Pt. requesting for Atrovent HHN for c/o wheezy. no c/o sob/ chest pain noted.
Patient A&Ox4, vitals within range of baseline. Safety maintained, call bell within reach. Nursing care on-going. Pending Midline placement
vss other than bp, no cp, sob
Patient A&OX4, no distress noted. BP of 89/54 and HR of 90. No s/s of hypotension noted and no c/o chest pain. Patient stable at this time.
vss except BP

## 2022-09-08 NOTE — PROVIDER CONTACT NOTE (OTHER) - RECOMMENDATIONS
notify provider of changes
call rrt
Midline placement
Ordered to keep Heparin gtt at ordered rate of 14cc/hr, and to resend PTT level in 6 hours.
Provider to bedside.

## 2022-09-08 NOTE — PROGRESS NOTE ADULT - PROBLEM SELECTOR PLAN 3
- c/w Sildenafil, Dr. Dr. Yoon following  - Possible plan for IV Flolan per Pulm  - Would reconsider lung transplant

## 2022-09-08 NOTE — PROGRESS NOTE ADULT - ATTENDING COMMENTS
42F PMHx of pulmonary HTN (group I and group IV; on Treprostinil and Xarelto), home o2 (2L), ILD, JULIA, obesity, and PPM presents on 8/28 for pain, nausea and vomiting, found to be in profound RV failure s/p dobutamine, Marcela, and aggressive diuresis. Patient also noted to have RA clot in transit on TTE s/p mechanical thrombectomy which was complicated by left groin pseudoaneurysm s/p thrombin injection. Course complicated by EDOUARD likely cardiorenal which has improved.    TTE done which showed hyperdynamic and compressed LV, severe RV dysfunction, mod TR, dilated IVC. Repeat TTE with improved RV function however RA thrombus noted.   CTA:  marked enlargement of PA. No PE. B/L GGOs.     Positive blood culture for coag neg staph noted as above, with concern for possible seeding of pacemaker or thrombus noted in RA.  She is afebrile, repeat cultures are negative and she is being empirically treated for bacteremia.  MYRIAM was ordered, however patient is at high risk for this procedure given her pulmonary hypertension.  We would recommend empiric treatment course for endocarditis.  Will follow clinically.  Aneurusm in the groin is enlarging and she has an indurated area on the subcutaneous tissue of the abdomen where treprostinil port was previously.  Awaiting surgery evaluation and vascular recommendation regarding anticoagulation.  Agree with plan as outlined above.

## 2022-09-08 NOTE — PROGRESS NOTE ADULT - PROBLEM SELECTOR PLAN 7
Patient had positive culture 8/29 for S. Lugdunesis, TTE without vegetations. Patient on aztreonam for initial concern for sepsis but without adequate converge for this pathogen. Now currently on cefazolin for 4 weeks. Source may be 2/2 sub-q infection from previous remodulin placement site. Plan for MYRIAM

## 2022-09-08 NOTE — PROGRESS NOTE ADULT - PROBLEM SELECTOR PLAN 1
- Off dobutamine   - K>4, Mg >2  - Monitor I/O, daily weights   - Restart furosemide 20 every other day    - c/w spironolactone  - Daily standing weights

## 2022-09-08 NOTE — PROGRESS NOTE ADULT - ASSESSMENT
42F PMH PE on xarelto, pulmonary HTN (likely group I and group IV), ILD, JULIA, obesity presents to the hospital for 2 days of palpitations and abdominal pain/nausea/emesis. Denies any inciting event. Noted to have labile hypotension and labs notable for worsening transaminitis, BNP 9k (much higher than prior) as well as acute kidney injury. Feels improved since initiation of /Marcela. Found to have RA clot on repeat TTE and underwent CTA which did not reveal any PE but went for aspiration and had partial thrombus removal and stopped as appeared more chronic. Changed to heparin gtt. Course c/b pseudoaneurysm and underwent thrombin injection 9/3. Patient taken off Marcela over past weekend.   Improving LFTs and renal function normalized. Patient continued Remodulin with plan with possible transition to IV Remodulin. Course c/b bacteremia likely 2/2 to subq port access.       TTE done which showed hyperdynamic and compressed LV, severe RV dysfunction, mod TR, dilated IVC. Repeat TTE with improved RV function however RA thrombus noted. Overall stage D, NYHA class IV with severe pulmonary HTN.

## 2022-09-08 NOTE — PROCEDURE NOTE - NSICDXPROCEDURE_GEN_ALL_CORE_FT
PROCEDURES:  Incision and drainage of simple skin abscess 08-Sep-2022 19:54:55  Luana Kirk P  
PROCEDURES:  Injection, thrombin 03-Sep-2022 14:15:39  Lucero Castro

## 2022-09-08 NOTE — PROGRESS NOTE ADULT - SUBJECTIVE AND OBJECTIVE BOX
42yPatient is a 42y old  Female who presents with a chief complaint of Palpitations (08 Sep 2022 18:37)      Interval history:  afebrile, pain in abdomen only on manipulation, worse pain in lt groin.     Allergies:   adhesives (Rash)  albuterol (Other; Rash)  penicillin (Rash)  vancomycin (Rash)    Antimicrobials:  ceFAZolin   IVPB 2000 milliGRAM(s) IV Intermittent every 8 hours  ceFAZolin   IVPB          REVIEW OF SYSTEMS:  No chest pain   No  SOB  No N/V  No dysuria       Vital Signs Last 24 Hrs  T(C): 36.7 (09-08-22 @ 16:03), Max: 36.8 (09-08-22 @ 04:33)  T(F): 98 (09-08-22 @ 16:03), Max: 98.3 (09-08-22 @ 04:33)  HR: 90 (09-08-22 @ 16:03) (85 - 98)  BP: 102/72 (09-08-22 @ 16:03) (85/65 - 108/68)  BP(mean): --  RR: 18 (09-08-22 @ 16:03) (18 - 20)  SpO2: 99% (09-08-22 @ 16:03) (97% - 100%)      PHYSICAL EXAM:  Pt in no acute distress, alert, awake.   + PPM   rt sided pump needle with surrounding fluctuance   lt groin induration  no phlebitis                             11.6   13.73 )-----------( 292      ( 08 Sep 2022 04:26 )             37.1   09-08    133<L>  |  97  |  11  ----------------------------<  89  4.1   |  23  |  0.88    Ca    8.9      08 Sep 2022 04:26  Phos  4.2     09-08  Mg     1.8     09-08    TPro  6.9  /  Alb  3.6  /  TBili  0.8  /  DBili  x   /  AST  23  /  ALT  79<H>  /  AlkPhos  60  09-08      LIVER FUNCTIONS - ( 08 Sep 2022 04:26 )  Alb: 3.6 g/dL / Pro: 6.9 g/dL / ALK PHOS: 60 U/L / ALT: 79 U/L / AST: 23 U/L / GGT: x                 Radiology:  < from: US Duplex Arterial Lower Ext Ltd, Left (09.08.22 @ 13:19) >  IMPRESSION:  No evidence of pseudoaneurysm within the left groin.    Left groin hematoma measuring 4.5 x 1.4 x 3.0 cm, previously 3.3 x 1.4 x   1.6 cm.    Additional left groin hematoma located more superficially measuring 3.7 x   5.7 x 1.9 cm, which was not previously imaged and may be new.

## 2022-09-08 NOTE — PROGRESS NOTE ADULT - ASSESSMENT
42 yr old female with PMHx of PulmHTN class I/VI (dx 2014) on continuous Treprostinil (Remodulin) (sildenafil - held initially) c/b Rt heart failure s/p PPM (Dual chamber 2016), Chronic P.E. on rivaroxaban (dx 2017), SVT s/p ablation (5/2020), Asthma (chronic steroid use - prednisone), Fe deficiency anemia admitted on 8/28 for palpitations accompanied by orthopnea/N/V/RUQ abd pain over a 2 day period.  Hospital course c/b hypotension, EDOUARD, transaminitis due to RV sided heart failure requiring Marcela and inotropic support, found to have RA thrombus started on AC, s/p directed thrombus aspiration 8/31/22, c/b Left common femoral artery pseudoaneurysm s/p thormbin injection on 9/3. Patient consulted for lung transplant evaluation.    #pre lung transplant evaluation  -weight 207 following aggressive diuresis, previously seen as outpatient weighed 227lb  --please obtain standing weight  -currently on RA sats 90-95%  -discussed lung transplant process and the importance of pre surgical optimization with exercise and weight loss, patient is in agreement and motivated  -recommend PT for rehab and Nutrition for pre transplant counseling during this admisison , encourage patient to ambulate in unit  -will continue to follow  -discussed case at our lung transplant meeting today - reiterated the importance of mobilization and rehab    Above discussed with Dr Lou

## 2022-09-08 NOTE — CONSULT NOTE ADULT - SUBJECTIVE AND OBJECTIVE BOX
42 yr old female with PulmHTN class I/VI on Treprostinil therapy, Chronic PE, ILD, JULIA who originally presented with palpitations accompanied by orthopnea/N/V/RUQ abd pain over a 2 day period. Course c/b hypotension secondary to acute on chronic cor pulmonale with end organ dysfx (EDOUARD, transaminitis).    General surgery consulted for induration around R subcutaneous injection port. Pt said the R site was painful and placed another subQ port on her left side.    PAST MEDICAL & SURGICAL HISTORY:  Tachycardia      Anemia      Pulmonary hypertension      Pulmonary embolism      Asthma  On home O2 nightly at 2L via NC      PE (pulmonary thromboembolism)  on Xarelto 10 mg daily      Sinusitis      Corneal disorder      Right heart failure      CAD (coronary artery disease)      H/O pulmonary hypertension      Pulmonary embolism      Asthma      History of tachycardia      On supplemental oxygen by nasal cannula  O2 @ 2L      Pacemaker      Skin mass  left upper thigh mass      History of tubal ligation      Pacemaker      H/O tubal ligation      History of pacemaker  12/19 - [employer] model Ingevity 4469          MEDICATIONS  (STANDING):  ceFAZolin   IVPB 2000 milliGRAM(s) IV Intermittent every 8 hours  ceFAZolin   IVPB      chlorhexidine 4% Liquid 1 Application(s) Topical <User Schedule>  cyanocobalamin 1000 MICROGram(s) Oral daily  pantoprazole    Tablet 40 milliGRAM(s) Oral before breakfast  polyethylene glycol 3350 17 Gram(s) Oral every 12 hours  predniSONE   Tablet 10 milliGRAM(s) Oral daily  Remodulin (Treprostinil) 5mG/mL 1 Vial(s) 1 Vial(s) SubCutaneous Continuous Pump  senna 2 Tablet(s) Oral at bedtime  sildenafil (REVATIO) 20 milliGRAM(s) Oral <User Schedule>  spironolactone 25 milliGRAM(s) Oral daily    MEDICATIONS  (PRN):  acetaminophen     Tablet .. 650 milliGRAM(s) Oral every 6 hours PRN Mild Pain (1 - 3)  aluminum hydroxide/magnesium hydroxide/simethicone Suspension 30 milliLiter(s) Oral every 4 hours PRN Dyspepsia  artificial  tears Solution 1 Drop(s) Both EYES three times a day PRN Dry Eyes  HYDROmorphone  Injectable 0.5 milliGRAM(s) IV Push every 4 hours PRN Pain  ipratropium    for Nebulization 500 MICROGram(s) Nebulizer every 6 hours PRN Bronchospasm  melatonin 3 milliGRAM(s) Oral at bedtime PRN Insomnia  ondansetron Injectable 4 milliGRAM(s) IV Push every 8 hours PRN Nausea and/or Vomiting  sodium chloride 0.65% Nasal 1 Spray(s) Both Nostrils every 6 hours PRN Nasal Congestion  sodium chloride 0.9% lock flush 10 milliLiter(s) IV Push every 1 hour PRN Pre/post blood products, medications, blood draw, and to maintain line patency      Allergies    adhesives (Rash)  penicillin (Rash)  vancomycin (Rash)    Intolerances    albuterol (Other; Rash)      SOCIAL HISTORY:    FAMILY HISTORY:  Family history of diabetes mellitus  in brother    Family history of diabetes mellitus in mother    FH: anemia        PHYSICAL EXAM  General: NAD, resting comfortably  HEENT: NC/AT, EOMI  Abdominal: 7x10cm indurated area around R subQ port - very tender with small area of fluctuance, erythamatous  Extremities: WWP, normal strength, no clubbing/cyanosis/edema  Neuro: A/O x 3    Vital Signs Last 24 Hrs  T(C): 36.7 (08 Sep 2022 16:03), Max: 36.8 (08 Sep 2022 04:33)  T(F): 98 (08 Sep 2022 16:03), Max: 98.3 (08 Sep 2022 04:33)  HR: 90 (08 Sep 2022 16:03) (85 - 98)  BP: 99/58 (08 Sep 2022 14:30) (85/65 - 108/68)  BP(mean): --  RR: 18 (08 Sep 2022 16:03) (18 - 20)  SpO2: 99% (08 Sep 2022 16:03) (97% - 100%)    Parameters below as of 08 Sep 2022 16:03  Patient On (Oxygen Delivery Method): room air        I&O's Summary    07 Sep 2022 07:01  -  08 Sep 2022 07:00  --------------------------------------------------------  IN: 885 mL / OUT: 1850 mL / NET: -965 mL            LABS:                        11.6   13.73 )-----------( 292      ( 08 Sep 2022 04:26 )             37.1     09-08    133<L>  |  97  |  11  ----------------------------<  89  4.1   |  23  |  0.88    Ca    8.9      08 Sep 2022 04:26  Phos  4.2     09-08  Mg     1.8     09-08    TPro  6.9  /  Alb  3.6  /  TBili  0.8  /  DBili  x   /  AST  23  /  ALT  79<H>  /  AlkPhos  60  09-08    PT/INR - ( 07 Sep 2022 00:21 )   PT: 12.4 sec;   INR: 1.08 ratio         PTT - ( 08 Sep 2022 04:26 )  PTT:36.1 sec    CAPILLARY BLOOD GLUCOSE        LIVER FUNCTIONS - ( 08 Sep 2022 04:26 )  Alb: 3.6 g/dL / Pro: 6.9 g/dL / ALK PHOS: 60 U/L / ALT: 79 U/L / AST: 23 U/L / GGT: x             Cultures:      RADIOLOGY & ADDITIONAL STUDIES:      Plan:

## 2022-09-08 NOTE — PROGRESS NOTE ADULT - PROBLEM SELECTOR PLAN 5
- hematoma of left groin, no evidence of pseudoaneurysm.  - continue to monitor  - Will consider imaging. - hematoma of left groin, no evidence of pseudoaneurysm.  - Left groin hematoma expanding in size, and new superficial hematoma measuring 3.7x 5.7 x 1.9 cm.   - Per vascular no indication for intervention at this time as no signs of infection or nerve palsies present.   - Will continue to monitor and will repeat duplex  - Will monitor H/H - likely congestive hepatopathy  - downtrending and improving.

## 2022-09-08 NOTE — PROVIDER CONTACT NOTE (OTHER) - REASON
PTT 75.1, pt on Heparin gtt infusing @ 14cc/hr.
BP 89/54- patient to receive Sildenafil
No IV access
rrt called for pt with cp, blurry vision and diaphoretic
PTT 33.0
Pt.'s B/P 80/41
pt hypotensive at 89/60
Epistaxis

## 2022-09-08 NOTE — CONSULT NOTE ADULT - PROVIDER SPECIALTY LIST ADULT
MICU
Vascular Surgery
Pulmonology
Surgery
Vascular Cardiology
Intervent Cardiology
Pulmonology
Infectious Disease
Transplant Pulmonology
Heart Failure

## 2022-09-09 DIAGNOSIS — I82.621 ACUTE EMBOLISM AND THROMBOSIS OF DEEP VEINS OF RIGHT UPPER EXTREMITY: ICD-10-CM

## 2022-09-09 LAB
ALBUMIN SERPL ELPH-MCNC: 3.9 G/DL — SIGNIFICANT CHANGE UP (ref 3.3–5)
ALP SERPL-CCNC: 63 U/L — SIGNIFICANT CHANGE UP (ref 40–120)
ALT FLD-CCNC: 60 U/L — HIGH (ref 10–45)
ANION GAP SERPL CALC-SCNC: 10 MMOL/L — SIGNIFICANT CHANGE UP (ref 5–17)
APTT BLD: 31.2 SEC — SIGNIFICANT CHANGE UP (ref 27.5–35.5)
AST SERPL-CCNC: 24 U/L — SIGNIFICANT CHANGE UP (ref 10–40)
BASOPHILS # BLD AUTO: 0.05 K/UL — SIGNIFICANT CHANGE UP (ref 0–0.2)
BASOPHILS NFR BLD AUTO: 0.4 % — SIGNIFICANT CHANGE UP (ref 0–2)
BILIRUB SERPL-MCNC: 0.7 MG/DL — SIGNIFICANT CHANGE UP (ref 0.2–1.2)
BUN SERPL-MCNC: 12 MG/DL — SIGNIFICANT CHANGE UP (ref 7–23)
CALCIUM SERPL-MCNC: 9 MG/DL — SIGNIFICANT CHANGE UP (ref 8.4–10.5)
CHLORIDE SERPL-SCNC: 98 MMOL/L — SIGNIFICANT CHANGE UP (ref 96–108)
CO2 SERPL-SCNC: 25 MMOL/L — SIGNIFICANT CHANGE UP (ref 22–31)
CREAT SERPL-MCNC: 1.11 MG/DL — SIGNIFICANT CHANGE UP (ref 0.5–1.3)
EGFR: 64 ML/MIN/1.73M2 — SIGNIFICANT CHANGE UP
EOSINOPHIL # BLD AUTO: 0.11 K/UL — SIGNIFICANT CHANGE UP (ref 0–0.5)
EOSINOPHIL NFR BLD AUTO: 0.8 % — SIGNIFICANT CHANGE UP (ref 0–6)
GLUCOSE SERPL-MCNC: 93 MG/DL — SIGNIFICANT CHANGE UP (ref 70–99)
HCG SERPL-ACNC: <2 MIU/ML — SIGNIFICANT CHANGE UP
HCT VFR BLD CALC: 37.3 % — SIGNIFICANT CHANGE UP (ref 34.5–45)
HGB BLD-MCNC: 11.7 G/DL — SIGNIFICANT CHANGE UP (ref 11.5–15.5)
IMM GRANULOCYTES NFR BLD AUTO: 0.6 % — SIGNIFICANT CHANGE UP (ref 0–1.5)
LYMPHOCYTES # BLD AUTO: 0.94 K/UL — LOW (ref 1–3.3)
LYMPHOCYTES # BLD AUTO: 6.6 % — LOW (ref 13–44)
MAGNESIUM SERPL-MCNC: 1.8 MG/DL — SIGNIFICANT CHANGE UP (ref 1.6–2.6)
MCHC RBC-ENTMCNC: 23 PG — LOW (ref 27–34)
MCHC RBC-ENTMCNC: 31.4 GM/DL — LOW (ref 32–36)
MCV RBC AUTO: 73.4 FL — LOW (ref 80–100)
MONOCYTES # BLD AUTO: 0.92 K/UL — HIGH (ref 0–0.9)
MONOCYTES NFR BLD AUTO: 6.5 % — SIGNIFICANT CHANGE UP (ref 2–14)
NEUTROPHILS # BLD AUTO: 12.04 K/UL — HIGH (ref 1.8–7.4)
NEUTROPHILS NFR BLD AUTO: 85.1 % — HIGH (ref 43–77)
NRBC # BLD: 0 /100 WBCS — SIGNIFICANT CHANGE UP (ref 0–0)
PHOSPHATE SERPL-MCNC: 3.3 MG/DL — SIGNIFICANT CHANGE UP (ref 2.5–4.5)
PLATELET # BLD AUTO: 386 K/UL — SIGNIFICANT CHANGE UP (ref 150–400)
POTASSIUM SERPL-MCNC: 4.5 MMOL/L — SIGNIFICANT CHANGE UP (ref 3.5–5.3)
POTASSIUM SERPL-SCNC: 4.5 MMOL/L — SIGNIFICANT CHANGE UP (ref 3.5–5.3)
PROT SERPL-MCNC: 7.2 G/DL — SIGNIFICANT CHANGE UP (ref 6–8.3)
RBC # BLD: 5.08 M/UL — SIGNIFICANT CHANGE UP (ref 3.8–5.2)
RBC # FLD: 18.6 % — HIGH (ref 10.3–14.5)
SODIUM SERPL-SCNC: 133 MMOL/L — LOW (ref 135–145)
WBC # BLD: 14.15 K/UL — HIGH (ref 3.8–10.5)
WBC # FLD AUTO: 14.15 K/UL — HIGH (ref 3.8–10.5)

## 2022-09-09 PROCEDURE — 99233 SBSQ HOSP IP/OBS HIGH 50: CPT

## 2022-09-09 PROCEDURE — 74177 CT ABD & PELVIS W/CONTRAST: CPT | Mod: 26

## 2022-09-09 PROCEDURE — 99233 SBSQ HOSP IP/OBS HIGH 50: CPT | Mod: GC

## 2022-09-09 PROCEDURE — 99232 SBSQ HOSP IP/OBS MODERATE 35: CPT

## 2022-09-09 RX ORDER — HEPARIN SODIUM 5000 [USP'U]/ML
7500 INJECTION INTRAVENOUS; SUBCUTANEOUS EVERY 6 HOURS
Refills: 0 | Status: DISCONTINUED | OUTPATIENT
Start: 2022-09-09 | End: 2022-09-12

## 2022-09-09 RX ORDER — HEPARIN SODIUM 5000 [USP'U]/ML
7500 INJECTION INTRAVENOUS; SUBCUTANEOUS ONCE
Refills: 0 | Status: COMPLETED | OUTPATIENT
Start: 2022-09-09 | End: 2022-09-09

## 2022-09-09 RX ORDER — HEPARIN SODIUM 5000 [USP'U]/ML
4000 INJECTION INTRAVENOUS; SUBCUTANEOUS EVERY 6 HOURS
Refills: 0 | Status: DISCONTINUED | OUTPATIENT
Start: 2022-09-09 | End: 2022-09-09

## 2022-09-09 RX ORDER — HEPARIN SODIUM 5000 [USP'U]/ML
8500 INJECTION INTRAVENOUS; SUBCUTANEOUS EVERY 6 HOURS
Refills: 0 | Status: DISCONTINUED | OUTPATIENT
Start: 2022-09-09 | End: 2022-09-09

## 2022-09-09 RX ORDER — HEPARIN SODIUM 5000 [USP'U]/ML
8500 INJECTION INTRAVENOUS; SUBCUTANEOUS ONCE
Refills: 0 | Status: DISCONTINUED | OUTPATIENT
Start: 2022-09-09 | End: 2022-09-09

## 2022-09-09 RX ORDER — HEPARIN SODIUM 5000 [USP'U]/ML
3500 INJECTION INTRAVENOUS; SUBCUTANEOUS EVERY 6 HOURS
Refills: 0 | Status: DISCONTINUED | OUTPATIENT
Start: 2022-09-09 | End: 2022-09-12

## 2022-09-09 RX ORDER — HEPARIN SODIUM 5000 [USP'U]/ML
1400 INJECTION INTRAVENOUS; SUBCUTANEOUS
Qty: 25000 | Refills: 0 | Status: DISCONTINUED | OUTPATIENT
Start: 2022-09-09 | End: 2022-09-09

## 2022-09-09 RX ORDER — HEPARIN SODIUM 5000 [USP'U]/ML
1400 INJECTION INTRAVENOUS; SUBCUTANEOUS
Qty: 25000 | Refills: 0 | Status: DISCONTINUED | OUTPATIENT
Start: 2022-09-09 | End: 2022-09-12

## 2022-09-09 RX ADMIN — Medication 10 MILLIGRAM(S): at 05:21

## 2022-09-09 RX ADMIN — HEPARIN SODIUM 7500 UNIT(S): 5000 INJECTION INTRAVENOUS; SUBCUTANEOUS at 17:47

## 2022-09-09 RX ADMIN — HYDROMORPHONE HYDROCHLORIDE 0.5 MILLIGRAM(S): 2 INJECTION INTRAMUSCULAR; INTRAVENOUS; SUBCUTANEOUS at 01:27

## 2022-09-09 RX ADMIN — HYDROMORPHONE HYDROCHLORIDE 0.5 MILLIGRAM(S): 2 INJECTION INTRAMUSCULAR; INTRAVENOUS; SUBCUTANEOUS at 23:35

## 2022-09-09 RX ADMIN — SPIRONOLACTONE 25 MILLIGRAM(S): 25 TABLET, FILM COATED ORAL at 05:21

## 2022-09-09 RX ADMIN — Medication 20 MILLIGRAM(S): at 21:54

## 2022-09-09 RX ADMIN — Medication 1 TABLET(S): at 18:17

## 2022-09-09 RX ADMIN — Medication 100 MILLIGRAM(S): at 00:37

## 2022-09-09 RX ADMIN — PANTOPRAZOLE SODIUM 40 MILLIGRAM(S): 20 TABLET, DELAYED RELEASE ORAL at 05:20

## 2022-09-09 RX ADMIN — Medication 650 MILLIGRAM(S): at 08:45

## 2022-09-09 RX ADMIN — HEPARIN SODIUM 1400 UNIT(S)/HR: 5000 INJECTION INTRAVENOUS; SUBCUTANEOUS at 17:46

## 2022-09-09 RX ADMIN — Medication 650 MILLIGRAM(S): at 07:55

## 2022-09-09 RX ADMIN — HYDROMORPHONE HYDROCHLORIDE 0.5 MILLIGRAM(S): 2 INJECTION INTRAMUSCULAR; INTRAVENOUS; SUBCUTANEOUS at 13:40

## 2022-09-09 RX ADMIN — Medication 100 MILLIGRAM(S): at 07:54

## 2022-09-09 RX ADMIN — PREGABALIN 1000 MICROGRAM(S): 225 CAPSULE ORAL at 13:40

## 2022-09-09 RX ADMIN — SENNA PLUS 2 TABLET(S): 8.6 TABLET ORAL at 21:54

## 2022-09-09 RX ADMIN — HYDROMORPHONE HYDROCHLORIDE 0.5 MILLIGRAM(S): 2 INJECTION INTRAMUSCULAR; INTRAVENOUS; SUBCUTANEOUS at 17:57

## 2022-09-09 RX ADMIN — HYDROMORPHONE HYDROCHLORIDE 0.5 MILLIGRAM(S): 2 INJECTION INTRAMUSCULAR; INTRAVENOUS; SUBCUTANEOUS at 01:45

## 2022-09-09 RX ADMIN — Medication 100 MILLIGRAM(S): at 18:22

## 2022-09-09 RX ADMIN — Medication 20 MILLIGRAM(S): at 06:58

## 2022-09-09 RX ADMIN — Medication 20 MILLIGRAM(S): at 00:36

## 2022-09-09 RX ADMIN — CHLORHEXIDINE GLUCONATE 1 APPLICATION(S): 213 SOLUTION TOPICAL at 05:22

## 2022-09-09 RX ADMIN — HYDROMORPHONE HYDROCHLORIDE 0.5 MILLIGRAM(S): 2 INJECTION INTRAMUSCULAR; INTRAVENOUS; SUBCUTANEOUS at 18:15

## 2022-09-09 RX ADMIN — Medication 20 MILLIGRAM(S): at 15:22

## 2022-09-09 RX ADMIN — SENNA PLUS 2 TABLET(S): 8.6 TABLET ORAL at 00:36

## 2022-09-09 RX ADMIN — HYDROMORPHONE HYDROCHLORIDE 0.5 MILLIGRAM(S): 2 INJECTION INTRAMUSCULAR; INTRAVENOUS; SUBCUTANEOUS at 14:00

## 2022-09-09 NOTE — CHART NOTE - NSCHARTNOTESELECT_GEN_ALL_CORE
Event Note
MAR ACCEPT/Event Note
MICU/Transfer Note
Nutrition Services
POCUS NOTE/Event Note
POCUS NOTE/Event Note
POCUS- Resident/ Attending
VASCULAR/Event Note
post procedure check
Event Note
MICU Accept Note/Event Note
MICU NP Note/Event Note
Nutrition Services

## 2022-09-09 NOTE — PROGRESS NOTE ADULT - PROBLEM SELECTOR PLAN 3
RUE US showing DVT in a radial vein at the distal upper arm and cephalic vein thrombus at the proximal upper arm.  - Will restart heparin gtt

## 2022-09-09 NOTE — PROGRESS NOTE ADULT - PROBLEM SELECTOR PLAN 7
DVT Prophylaxis: Holding iso expanding hematoma   Diet: Regular  Dispo: Home with home PT DVT Prophylaxis: Heparin gtt  Diet: Regular  Dispo: Home with home PT

## 2022-09-09 NOTE — PROGRESS NOTE ADULT - SUBJECTIVE AND OBJECTIVE BOX
42yPatient is a 42y old  Female who presents with a chief complaint of Palpitations (09 Sep 2022 10:20)      Interval history:  Afebrile, abdominal pain improved since I&D, lt leg pain continues.       Allergies:   adhesives (Rash)  albuterol (Other; Rash)  penicillin (Rash)  vancomycin (Rash)      Antimicrobials:  ceFAZolin   IVPB 2000 milliGRAM(s) IV Intermittent every 8 hours      REVIEW OF SYSTEMS:  No chest pain   No SOB  No N/V  No rash.       Vital Signs Last 24 Hrs  T(C): 36.7 (09-09-22 @ 15:45), Max: 36.9 (09-09-22 @ 13:25)  T(F): 98.1 (09-09-22 @ 15:45), Max: 98.4 (09-09-22 @ 13:25)  HR: 89 (09-09-22 @ 15:45) (89 - 103)  BP: 131/65 (09-09-22 @ 15:45) (94/64 - 131/65)  BP(mean): --  RR: 18 (09-09-22 @ 15:45) (18 - 20)  SpO2: 99% (09-09-22 @ 15:45) (97% - 100%)      PHYSICAL EXAM:  Pt in no acute distress, alert, awake.   + PPM   rt sided wound with packing   lt groin induration  no phlebitis                            11.7   14.15 )-----------( 386      ( 09 Sep 2022 12:48 )             37.3   09-09    133<L>  |  98  |  12  ----------------------------<  93  4.5   |  25  |  1.11    Ca    9.0      09 Sep 2022 12:48  Phos  3.3     09-09  Mg     1.8     09-09    TPro  7.2  /  Alb  3.9  /  TBili  0.7  /  DBili  x   /  AST  24  /  ALT  60<H>  /  AlkPhos  63  09-09      LIVER FUNCTIONS - ( 09 Sep 2022 12:48 )  Alb: 3.9 g/dL / Pro: 7.2 g/dL / ALK PHOS: 63 U/L / ALT: 60 U/L / AST: 24 U/L / GGT: x               Culture - Blood (collected 07 Sep 2022 17:37)  Source: .Blood Blood-Venous  Preliminary Report (08 Sep 2022 23:01):    No growth to date.    Culture - Blood (collected 07 Sep 2022 17:25)  Source: .Blood Blood-Venous  Preliminary Report (08 Sep 2022 23:01):    No growth to date.        Radiology:      < from: CT Abdomen and Pelvis w/ IV Cont (09.09.22 @ 14:48) >  IMPRESSION:  Small right ventral abdominal wound with packing material with adjacent   skin thickening, mild subcutaneous fat stranding and foci of air.    Left ventral abdominal wall skin thickening and subcutaneous infiltration   with tiny air and fluid containing collection measuring 1.2 cm.    Bilateral ill-defined groin hematoma.    Pedunculated fatty lesion along the left anterior thigh. Correlate with   direct visualization.        < from: [facility] Duplex Upper Ext Vein Scan, Right (09.08.22 @ 23:12) >  PRELIM INTERPRETATION:  High bifurcation of the radial vein.  DVT in a radial vein at the distal upper arm.  Cephalic vein thrombus at the proximal upper arm.

## 2022-09-09 NOTE — PROGRESS NOTE ADULT - ASSESSMENT
42 yr old female with PMHx of PulmHTN class I/VI (dx 2014) on continuous Treprostinil (Remodulin) (sildenafil - held initially) c/b Rt heart failure s/p PPM (Dual chamber 2016), Chronic P.E. on rivaroxaban (dx 2017), SVT s/p ablation (5/2020), Asthma (chronic steroid use - prednisone), Fe deficiency anemia admitted on 8/28 for palpitations accompanied by orthopnea/N/V/RUQ abd pain over a 2 day period.  Hospital course c/b hypotension, EDOUARD, transaminitis due to RV sided heart failure requiring Marcela and inotropic support, found to have RA thrombus started on AC, s/p directed thrombus aspiration 8/31/22, c/b Left common femoral artery pseudoaneurysm s/p thormbin injection on 9/3. Patient consulted for lung transplant evaluation.    #pre lung transplant evaluation  -weight 207 following aggressive diuresis, previously seen as outpatient weighed 227lb     --- today standing weight is 208 with BMI 35, discussed target weight of 190lbs to get to BMI 31 and that a BMI of 28 or less is ideal for lung transplant  -spent 20mins educating on lung transplant process and the importance of rehab , patient in agreement and would like to move forward with the process  -noted to have expanding L groin hematoma in setting of AC which is a barrier to working with PT at this time however patient was able to ambulate and perform ADLs prior to transplant and once medically cleared is motivated to work with PT    reviewed lung transplant work up from [facility] which majority of imaging was done 11/2020 patient reports reflux symptoms with normal esophagram however if moving forward with evaluation would consider repeating GI work up.    Above discussed with Dr Lou 42 yr old female with PMHx of PulmHTN class I/VI (dx 2014) on continuous Treprostinil (Remodulin) (sildenafil - held initially) c/b Rt heart failure s/p PPM (Dual chamber 2016), Chronic P.E. on rivaroxaban (dx 2017), SVT s/p ablation (5/2020), Asthma (chronic steroid use - prednisone), Fe deficiency anemia admitted on 8/28 for palpitations accompanied by orthopnea/N/V/RUQ abd pain over a 2 day period.  Hospital course c/b hypotension, EDOUARD, transaminitis due to RV sided heart failure requiring Marcela and inotropic support, found to have RA thrombus started on AC, s/p directed thrombus aspiration 8/31/22, c/b Left common femoral artery pseudoaneurysm s/p thormbin injection on 9/3. Patient consulted for lung transplant evaluation.    #pre lung transplant evaluation  -weight 207 following aggressive diuresis, previously seen as outpatient weighed 227lb     --- today standing weight is 208 with BMI 35, discussed target weight of 190lbs to get to BMI 31 and that a BMI of 28 or less is ideal for lung transplant  -spent 20mins educating on lung transplant process and the importance of rehab , patient in agreement and would like to move forward with the process  -noted to have expanding L groin hematoma in setting of AC which is a barrier to working with PT at this time however patient was able to ambulate and perform ADLs prior to transplant and once medically cleared is motivated to work with PT    #PE/dvt  -would recommend heme consult for hypercoagulable w/u    reviewed lung transplant work up from [facility] which majority of imaging was done 11/2020 patient reports reflux symptoms with normal esophagram however if moving forward with evaluation would consider repeating GI work up.    Above discussed with Dr Lou

## 2022-09-09 NOTE — PROGRESS NOTE ADULT - PROBLEM SELECTOR PLAN 4
- hematoma of left groin, no evidence of pseudoaneurysm.  - Left groin hematoma expanding in size, and new superficial hematoma measuring 3.7x 5.7 x 1.9 cm.   - Per vascular no indication for intervention at this time as no signs of infection or nerve palsies present.   - Holding anticoagulation at this time given new hematoma in LLE and expansion of existing hematoma. Risks of bleeding currently outweigh benefits at this time. Will continue to reevaluate need for anticoagulation given patient's atrial thrombus and chronic pulmonary embolism. Patient has no new active PE from the last CT-A.   - Will continue to monitor hematoma  - CT A&P w/ IV contrast and CT LLE w/ IV contrast.   - Will monitor H/H - hematoma of left groin, no evidence of pseudoaneurysm.  - Left groin hematoma expanding in size, and new superficial hematoma measuring 3.7x 5.7 x 1.9 cm.   - Per vascular no indication for intervention at this time as no signs of infection or nerve palsies present.   - f/u vascular surgery recommendations  - No evidence of abscess present of the left lower extremity hematoma  - Will monitor H/H

## 2022-09-09 NOTE — PROGRESS NOTE ADULT - SUBJECTIVE AND OBJECTIVE BOX
PULMONARY SERVICE FOLLOW UP CONSULT NOTE    SUBJECTIVE:  Denies chest pain, dyspnea, cough, or wheezing.  No acute complaints.     REVIEW OF SYSTEMS:  All additional ROS negative.    MEDICATIONS:  Pulmonary:  ipratropium    for Nebulization 500 MICROGram(s) Nebulizer every 6 hours PRN    Antimicrobials:  ceFAZolin   IVPB 2000 milliGRAM(s) IV Intermittent every 8 hours  ceFAZolin   IVPB        Anticoagulants:    Onc:    GI/:  aluminum hydroxide/magnesium hydroxide/simethicone Suspension 30 milliLiter(s) Oral every 4 hours PRN  pantoprazole    Tablet 40 milliGRAM(s) Oral before breakfast  polyethylene glycol 3350 17 Gram(s) Oral every 12 hours  senna 2 Tablet(s) Oral at bedtime    Endocrine:  predniSONE   Tablet 10 milliGRAM(s) Oral daily    Cardiac:  furosemide    Tablet 20 milliGRAM(s) Oral <User Schedule>  sildenafil (REVATIO) 20 milliGRAM(s) Oral <User Schedule>  spironolactone 25 milliGRAM(s) Oral daily    Other Medications:  acetaminophen     Tablet .. 650 milliGRAM(s) Oral every 6 hours PRN  artificial  tears Solution 1 Drop(s) Both EYES three times a day PRN  chlorhexidine 4% Liquid 1 Application(s) Topical <User Schedule>  cyanocobalamin 1000 MICROGram(s) Oral daily  HYDROmorphone  Injectable 0.5 milliGRAM(s) IV Push every 4 hours PRN  lidocaine 1% Injectable 50 milliLiter(s) Local Injection once  melatonin 3 milliGRAM(s) Oral at bedtime PRN  ondansetron Injectable 4 milliGRAM(s) IV Push every 8 hours PRN  Remodulin (Treprostinil) 5mG/mL 1 Vial(s) 1 Vial(s) SubCutaneous Continuous Pump  sodium chloride 0.65% Nasal 1 Spray(s) Both Nostrils every 6 hours PRN  sodium chloride 0.9% lock flush 10 milliLiter(s) IV Push every 1 hour PRN      PHYSICAL EXAM  Vital Signs Last 24 Hrs  T(C): 36.7 (09 Sep 2022 04:51), Max: 36.7 (08 Sep 2022 08:59)  T(F): 98.1 (09 Sep 2022 04:51), Max: 98.1 (09 Sep 2022 04:51)  HR: 92 (09 Sep 2022 06:55) (85 - 102)  BP: 100/60 (09 Sep 2022 06:55) (94/60 - 108/68)  BP(mean): --  RR: 18 (09 Sep 2022 04:51) (18 - 20)  SpO2: 100% (09 Sep 2022 04:51) (97% - 100%)    Parameters below as of 09 Sep 2022 04:51  Patient On (Oxygen Delivery Method): nasal cannula  O2 Flow (L/min): 2      09-08 @ 07:01  -  09-09 @ 07:00  --------------------------------------------------------  IN: 450 mL / OUT: 1500 mL / NET: -1050 mL            CONSTITUTIONAL: No acute distress.   HEENT:  Conjunctiva clear B/L.  Moist oral mucosa.   Cardiovascular: RRR with no murmurs. No JVD noted. No lower extremity edema B/L. Extremities are warm and well perfused.    Respiratory: Lungs CTAB. No wrr. No accessory muscle use.   Gastrointestinal:  Soft, nontender. Non-distended. Non-rigid.    Neurologic:  Alert and awake. Moving all extremities. Following commands.    Skin:  No gross rashes notes.    LABS:      CBC Full  -  ( 08 Sep 2022 04:26 )  WBC Count : 13.73 K/uL  RBC Count : 5.16 M/uL  Hemoglobin : 11.6 g/dL  Hematocrit : 37.1 %  Platelet Count - Automated : 292 K/uL  Mean Cell Volume : 71.9 fl  Mean Cell Hemoglobin : 22.5 pg  Mean Cell Hemoglobin Concentration : 31.3 gm/dL  Auto Neutrophil # : 9.22 K/uL  Auto Lymphocyte # : 2.92 K/uL  Auto Monocyte # : 1.18 K/uL  Auto Eosinophil # : 0.28 K/uL  Auto Basophil # : 0.07 K/uL  Auto Neutrophil % : 67.2 %  Auto Lymphocyte % : 21.3 %  Auto Monocyte % : 8.6 %  Auto Eosinophil % : 2.0 %  Auto Basophil % : 0.5 %    09-08    133<L>  |  97  |  11  ----------------------------<  89  4.1   |  23  |  0.88    Ca    8.9      08 Sep 2022 04:26  Phos  4.2     09-08  Mg     1.8     09-08    TPro  6.9  /  Alb  3.6  /  TBili  0.8  /  DBili  x   /  AST  23  /  ALT  79<H>  /  AlkPhos  60  09-08    PTT - ( 08 Sep 2022 04:26 )  PTT:36.1 sec                  RADIOLOGY & ADDITIONAL STUDIES: PULMONARY SERVICE FOLLOW UP CONSULT NOTE    SUBJECTIVE:  S/p right abd wall I&D.   Left groin pain similar from prior.   No dyspnea.     REVIEW OF SYSTEMS:  All additional ROS negative.    MEDICATIONS:  Pulmonary:  ipratropium    for Nebulization 500 MICROGram(s) Nebulizer every 6 hours PRN    Antimicrobials:  ceFAZolin   IVPB 2000 milliGRAM(s) IV Intermittent every 8 hours  ceFAZolin   IVPB        Anticoagulants:    Onc:    GI/:  aluminum hydroxide/magnesium hydroxide/simethicone Suspension 30 milliLiter(s) Oral every 4 hours PRN  pantoprazole    Tablet 40 milliGRAM(s) Oral before breakfast  polyethylene glycol 3350 17 Gram(s) Oral every 12 hours  senna 2 Tablet(s) Oral at bedtime    Endocrine:  predniSONE   Tablet 10 milliGRAM(s) Oral daily    Cardiac:  furosemide    Tablet 20 milliGRAM(s) Oral <User Schedule>  sildenafil (REVATIO) 20 milliGRAM(s) Oral <User Schedule>  spironolactone 25 milliGRAM(s) Oral daily    Other Medications:  acetaminophen     Tablet .. 650 milliGRAM(s) Oral every 6 hours PRN  artificial  tears Solution 1 Drop(s) Both EYES three times a day PRN  chlorhexidine 4% Liquid 1 Application(s) Topical <User Schedule>  cyanocobalamin 1000 MICROGram(s) Oral daily  HYDROmorphone  Injectable 0.5 milliGRAM(s) IV Push every 4 hours PRN  lidocaine 1% Injectable 50 milliLiter(s) Local Injection once  melatonin 3 milliGRAM(s) Oral at bedtime PRN  ondansetron Injectable 4 milliGRAM(s) IV Push every 8 hours PRN  Remodulin (Treprostinil) 5mG/mL 1 Vial(s) 1 Vial(s) SubCutaneous Continuous Pump  sodium chloride 0.65% Nasal 1 Spray(s) Both Nostrils every 6 hours PRN  sodium chloride 0.9% lock flush 10 milliLiter(s) IV Push every 1 hour PRN      PHYSICAL EXAM  Vital Signs Last 24 Hrs  T(C): 36.7 (09 Sep 2022 04:51), Max: 36.7 (08 Sep 2022 08:59)  T(F): 98.1 (09 Sep 2022 04:51), Max: 98.1 (09 Sep 2022 04:51)  HR: 92 (09 Sep 2022 06:55) (85 - 102)  BP: 100/60 (09 Sep 2022 06:55) (94/60 - 108/68)  BP(mean): --  RR: 18 (09 Sep 2022 04:51) (18 - 20)  SpO2: 100% (09 Sep 2022 04:51) (97% - 100%)    Parameters below as of 09 Sep 2022 04:51  Patient On (Oxygen Delivery Method): nasal cannula  O2 Flow (L/min): 2      09-08 @ 07:01  -  09-09 @ 07:00  --------------------------------------------------------  IN: 450 mL / OUT: 1500 mL / NET: -1050 mL            CONSTITUTIONAL: No acute distress.   HEENT:  Conjunctiva clear B/L.  Moist oral mucosa.   Cardiovascular: RRR with no murmurs. No JVD noted. No lower extremity edema B/L. Extremities are warm and well perfused.    Respiratory: Lungs CTAB. No wrr. No accessory muscle use.   Gastrointestinal:  Soft, nontender. Non-distended. Non-rigid.    Neurologic:  Alert and awake. Moving all extremities. Following commands.    Skin:  No gross rashes notes.    LABS:      CBC Full  -  ( 08 Sep 2022 04:26 )  WBC Count : 13.73 K/uL  RBC Count : 5.16 M/uL  Hemoglobin : 11.6 g/dL  Hematocrit : 37.1 %  Platelet Count - Automated : 292 K/uL  Mean Cell Volume : 71.9 fl  Mean Cell Hemoglobin : 22.5 pg  Mean Cell Hemoglobin Concentration : 31.3 gm/dL  Auto Neutrophil # : 9.22 K/uL  Auto Lymphocyte # : 2.92 K/uL  Auto Monocyte # : 1.18 K/uL  Auto Eosinophil # : 0.28 K/uL  Auto Basophil # : 0.07 K/uL  Auto Neutrophil % : 67.2 %  Auto Lymphocyte % : 21.3 %  Auto Monocyte % : 8.6 %  Auto Eosinophil % : 2.0 %  Auto Basophil % : 0.5 %    09-08    133<L>  |  97  |  11  ----------------------------<  89  4.1   |  23  |  0.88    Ca    8.9      08 Sep 2022 04:26  Phos  4.2     09-08  Mg     1.8     09-08    TPro  6.9  /  Alb  3.6  /  TBili  0.8  /  DBili  x   /  AST  23  /  ALT  79<H>  /  AlkPhos  60  09-08    PTT - ( 08 Sep 2022 04:26 )  PTT:36.1 sec                  RADIOLOGY & ADDITIONAL STUDIES:

## 2022-09-09 NOTE — PROGRESS NOTE ADULT - PROBLEM SELECTOR PLAN 2
21  Ortiz Narayan : 1964 Sex: female  Age: 62 y.o. Patient was referred by Ida Vera MD    Chief Complaint   Patient presents with    Post-Op Check      right foot sx sent for xrays today in cam walker       SUBJECTIVE patient is seen today regarding right foot surgery. Patient is 7 weeks postop patient on  noticed that she was having a lot of pain in the surgically corrected area no new trauma. Patient denies fever chills or night sweats.   HPI  Review of Systems  Const: Denies constitutional symptoms  Musculo: Denies symptoms other than stated above  Skin: Denies symptoms other than stated above       Current Outpatient Medications:     atorvastatin (LIPITOR) 20 MG tablet, take 1 tablet by mouth once daily for cholesterol, Disp: 90 tablet, Rfl: 3    losartan-hydroCHLOROthiazide (HYZAAR) 100-25 MG per tablet, 1 tab daily, Disp: 90 tablet, Rfl: 3  Allergies   Allergen Reactions    Sulfa Antibiotics Hives, Shortness Of Breath and Palpitations       Past Medical History:   Diagnosis Date    Hyperlipidemia     Hypertension     Prolonged emergence from general anesthesia      Past Surgical History:   Procedure Laterality Date    ARTHRODESIS Right 2021    ARTHRODESIS SECOND MPJ RIGHT FOOT ARTHRODESIS SECOND IPJ RIGHT FOOT performed by Erik Kwong DPM at [address] Bilateral 2019    BREAST REDUCTION SURGERY Bilateral 3/25/2019    BILATERAL BREAST REDUCTION performed by Kevyn Trores MD at [address]      x2    LAPAROSCOPY      right ovarian cyst    LAPAROSCOPY      right tubal preg     Family History   Problem Relation Age of Onset    Hypertension Father     Heart Attack Father     Stroke Mother     Hypertension Mother     Cancer Brother         renal    Kidney Cancer Brother     Thyroid Disease Sister     Diabetes Brother     Thyroid Disease Brother Social History     Socioeconomic History    Marital status:      Spouse name: Not on file    Number of children: Not on file    Years of education: Not on file    Highest education level: Not on file   Occupational History    Not on file   Tobacco Use    Smoking status: Never Smoker    Smokeless tobacco: Never Used   Vaping Use    Vaping Use: Never used   Substance and Sexual Activity    Alcohol use: No    Drug use: Never    Sexual activity: Yes     Partners: Male   Other Topics Concern    Not on file   Social History Narrative    Not on file     Social Determinants of Health     Financial Resource Strain:     Difficulty of Paying Living Expenses: Not on file   Food Insecurity:     Worried About Running Out of Food in the Last Year: Not on file    Mejia of Food in the Last Year: Not on file   Transportation Needs:     Lack of Transportation (Medical): Not on file    Lack of Transportation (Non-Medical):  Not on file   Physical Activity:     Days of Exercise per Week: Not on file    Minutes of Exercise per Session: Not on file   Stress:     Feeling of Stress : Not on file   Social Connections:     Frequency of Communication with Friends and Family: Not on file    Frequency of Social Gatherings with Friends and Family: Not on file    Attends Yarsanism Services: Not on file    Active Member of [address] [place] [address] [employer] or Organizations: Not on file    Attends Club or Organization Meetings: Not on file    Marital Status: Not on file   Intimate Partner Violence:     Fear of Current or Ex-Partner: Not on file    Emotionally Abused: Not on file    Physically Abused: Not on file    Sexually Abused: Not on file   Housing Stability:     Unable to Pay for Housing in the Last Year: Not on file    Number of [place] in the Last Year: Not on file    Unstable Housing in the Last Year: Not on file       Vitals:    11/11/21 0904   Temp: 97.4 °F (36.3 °C)   TempSrc: Temporal   Weight: 209 lb (94.8 kg) Focused Lower Extremity Physical Exam:  Vitals:    11/11/21 0904   Temp: 97.4 °F (36.3 °C)        Foot Exam     Ortho Exam    Vascular: pulses  dp  pt +2/4 DP and PT right foot  Capillary Refill Time:   Hair growth  Skin:    Edema:    Neurologic:      Musculoskeletal/ Orthopedic examination: Pain with palpation at the incision site pain with palpation to the second metatarsal.  NAIL   Web space   Derm: Incision site is healed no signs of erythematous or infection      New x-rays were taken today showing a transverse fracture through the neck of the second metatarsal proximal to the plate. Assessment and Plan: Today after reviewing the x-ray the fracture transverse metatarsal fractures right proximal to the end of the plate with loosening of the screw today we discussed treatment options patient is in a nonweightbearing cam walker I am getting go in and open reduce internal fixate the fracture with a longer plate and Arthrex bone graft. This will be done at [facility] in 2 weeks patient is to stay in the cam walker with weightbearing as tolerated. Today we did review the surgical procedure and anesthesia all complications with surgery and anesthesia including over or under correction loss of limb infection recurrence and further surgery nonunions. Patient agrees to surgery and return states she is understanding of all complications  Carlitos Rogers was seen today for post-op check. Diagnoses and all orders for this visit:    Post-op pain  -     XR FOOT RIGHT (MIN 3 VIEWS); Future    Closed nondisplaced fracture of second metatarsal bone of right foot, initial encounter        Return in about 2 weeks (around 11/25/2021). Seen By:  Eddie Rucker DPM      Document was created using voice recognition software. Note was reviewed, however may contain grammatical errors. - Staph lugdunesis 8/29 bcx, coagulase-negative staph treated in a similar manner to staph aureus  - Likely source is abscess of the rt subcutaneous port  - F/u BCx (9/7/22)  - Per anesthesia, MYRIAM is high risk and needs to be done under general anesthesia with intubation. Per pulmonology, patient is high risk given her pulmonary hypertension and is not able to undergo procedure.   - Cefazolin 2g q 8 x 4 week duration  - Ct A&P w/ IV contrast and CT LLE w/ IV Contrast  - f/u general surgery recommendations for rt abdominal wall abscess - Staph lugdunesis 8/29 bcx, coagulase-negative staph treated in a similar manner to staph aureus  - Likely source is abscess of the rt subcutaneous port  - BCx 9/7 - NGTD  - Per anesthesia, MYRIAM is high risk and needs to be done under general anesthesia with intubation. Per pulmonology, patient is high risk given her pulmonary hypertension and is not able to undergo procedure.   - Cefazolin 2g q 8 x 4 week duration  - CT A&P showing no evidence of abscess present in left leg hematoma.   - s/p I&D (9/8/22)  - f/u wound culture.

## 2022-09-09 NOTE — PROGRESS NOTE ADULT - ASSESSMENT
42F PMHx of pulmonary HTN (group I and group IV; on Treprostinil and Xarelto), home o2 (2L), ILD, JULIA, obesity, and PPM presents on  for pain, nausea and vomiting, found to be in profound RV failure s/p dobutamine, Marcela, and aggressive diuresis. Patient also noted to have RA clot in transit on TTE s/p mechanical thrombectomy which was complicated by left groin pseudoaneurysm s/p thrombin injection. Course complicated by EDOUARD likely cardiorenal which has improved.    TTE done which showed hyperdynamic and compressed LV, severe RV dysfunction, mod TR, dilated IVC. Repeat TTE with improved RV function however RA thrombus noted.   CTA:  marked enlargement of PA. No PE. 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    #Group I and IV pulmonary hypertension  #RV failure  #RA thrombus  #RUE radial and cephalic vein thrombi.    Recommendations  -CW Sildenafil 20 mg po tid.   -CW Remodulin SQ pump at 42 ng/kg/min     -CW chronic prednisone at 10 mg po daily.   -CW ipratropium prn.   -Diuretic mgmt per HF. On lasix 20 mg po QOD.    -Being evaluated by transplant team.   -Aggressive physical therapy. PT/OT consult. PM&R. Out of bed to chair daily.   -Optimize nutrition. Consult nutrition.   -Awaiting vascular surgery final recommendations regarding left groin hematomas in setting of VTE disease.  -Risk outweight benefit of MYRIAM w/ general anesthesia at this time for Staph Lugdunensus bacteremia.  -All teams contribution to care greatly appreciated    Patient should f/u with pulmonary, Dr. Yoon (pulmonary HTN) within 1-2 weeks of discharge. Please email [email or web address] and include patient's name,  and MRN and allow 24 hours for scheduling.    [address] [place] [address]  [phone]     Dr. Ramesh Echevarria, DO  Pulmonary and Critical Care Medicine Fellow   Available via Microsoft Teams - **Preferred**  Pulmonary Spectra #[id number] ([facility]) / [id number] ([facility])  Pager:  [phone]

## 2022-09-09 NOTE — PROGRESS NOTE ADULT - ATTENDING COMMENTS
42F PMHx of pulmonary HTN (group I and group IV; on Treprostinil and Xarelto), home o2 (2L), ILD, JULIA, obesity, and PPM presents on 8/28 for pain, nausea and vomiting, found to be in profound RV failure s/p dobutamine, Marcela, and aggressive diuresis. Patient also noted to have RA clot in transit on TTE s/p mechanical thrombectomy which was complicated by left groin pseudoaneurysm s/p thrombin injection. Course complicated by EDOUARD likely cardiorenal which has improved.    TTE done which showed hyperdynamic and compressed LV, severe RV dysfunction, mod TR, dilated IVC. Repeat TTE with improved RV function however RA thrombus noted.   CTA:  marked enlargement of PA. No PE. B/L GGOs.     Positive blood culture for coag neg staph noted as above, with concern for possible seeding of pacemaker or thrombus noted in RA.  She is afebrile, repeat cultures are negative and she is being empirically treated for bacteremia.  MYRIAM was ordered, however patient is at high risk for this procedure given her pulmonary hypertension.  We would recommend empiric treatment course for endocarditis.  Will follow clinically.  Aneurusm in the groin is enlarging and she has an indurated area on the subcutaneous tissue of the abdomen where treprostinil port was previously.  Awaiting surgery evaluation and vascular recommendation regarding anticoagulation.  Agree with plan as outlined above. 42F PMHx of pulmonary HTN (group I and group IV; on Treprostinil and Xarelto), home o2 (2L), ILD, JULIA, obesity, and PPM presents on 8/28 for pain, nausea and vomiting, found to be in profound RV failure s/p dobutamine, Marcela, and aggressive diuresis. Patient also noted to have RA clot in transit on TTE s/p mechanical thrombectomy which was complicated by left groin pseudoaneurysm s/p thrombin injection. Course complicated by EDOUARD likely cardiorenal which has improved.    TTE done which showed hyperdynamic and compressed LV, severe RV dysfunction, mod TR, dilated IVC. Repeat TTE with improved RV function however RA thrombus noted.   CTA:  marked enlargement of PA. No PE. B/L GGOs.     TTE deferred as she is high risk for anesthesia.  She is being treated for coag negative staph bacteremia.  Now with thrombus in the radial vein and cephalic vein.  IV access has been difficult and heparin held until if is established.  Anticoagulation will be resumed ASAP.    She is HD stable and stable from oxygenation standpoint.  Agree with plan as outlined above.

## 2022-09-09 NOTE — PROGRESS NOTE ADULT - ASSESSMENT
42 yr old female with PulmHTN class I/VI on Treprostinil therapy, Chronic PE, ILD, JULIA who originally presented with palpitations accompanied by orthopnea/N/V/RUQ abd pain over a 2 day period. Course c/b hypotension secondary to acute on chronic cor pulmonale with end organ dysfx (EDOAURD, transaminitis).

## 2022-09-09 NOTE — CHART NOTE - NSCHARTNOTEFT_GEN_A_CORE
Received a call from radiology regarding findings of R radial and cephalic vein VTE. Patient's heparin was held earlier in the day due to concerns for enlarging L groin hematoma visualized on Duplex. Patient has been saturating % on RA. Assessed patient at bedside, patient appears comfortable. Discussed with vascular surgery team on call regarding choice of anticoagulation who defers decision to primary team in the AM. Given the expanding hematoma and risks of bleeding and possible discussions with family members regarding the risks and benefit of continued anticoagulation, will hold off on anticoagulation until the morning. Vascular surgery night team will also notify the vascular surgery day team.

## 2022-09-09 NOTE — PROGRESS NOTE ADULT - ASSESSMENT
42 yr old female with stated hx significant for PulmHTN class I/VI on Treprostinil therapy, Chronic PE who originally presented with palpitations accompanied by orthopnea/N/V/RUQ abd pain over a 2 day period. Course c/b hypotension secondary to acute on chronic cor pulmonale with end organ dysfx (EDOUARD, transaminitis), and right atrial clot requiring catheter guided thrombectomy, as well as thrombin injection of right femoral pseudoaneurysm.     Patient found to have up-trending white count with high of 16 this AM. One set of blood cultures from 8/29 positive for staph lugdunensis. TTE from 8/29 and 8/31 identified no vegetations. Repeat cultures from 8/31 NGTD. Central venous line and A-line placed on 9/2, after positive culture. Patient with catheter directed thrombectomy on 8/31.   Patient with hx of PPM placed in 2016. Continuous Treprostinil pumped removed from site in RLQ following increased tenderness and swelling approximately 3 days ago, with patient re-implanting infusion pump in LLQ on her own.     Patient with positive culture of Staph Lugdunensis, which is typically pathogenic and not a contaminant, i/s/o worsening sub-q and skin infection with likely underlying abscess from previous Remodulin placement site. Patient empirically treated with course of aztreonam 2000mg BID from 8/29-9/1 out of concern for sepsis contributing to hypotension i/s/o elevated white count, but this would have little coverage for Staph Lugdunensis.       overall bacteremia, cellulitis and abscess, leucocytosis, tachycardia, sepsis, allergy to multiple abx.   pseudoaneurysm, s/p coiling.       PLAN:   -s/p drainage of abscess, follow up cx.   - repeat blood cultures NTD   - c/w cefazolin 2 gm iv q8h, tolerating it well.   - would hold off on MYRIAM given concern for need for intubation, after discussion with pulm would hold off.   - CT lt groin with no superinfection of hematoma  - trend cbc for leucocytosis, stable.     Plan discussed with raine Grover  Please contact through MS Teams   If no response or past 5 pm/weekend call [phone].

## 2022-09-09 NOTE — PROGRESS NOTE ADULT - SUBJECTIVE AND OBJECTIVE BOX
PROGRESS NOTE:   Authored by Alfonso Simons MD   Patient is a 42y old  Female who presents with a chief complaint of Palpitations (08 Sep 2022 22:14)      SUBJECTIVE / OVERNIGHT EVENTS: No acute events overnight. No chest pain,  palpitations, dyspnea, abdominal pain. nausea, vomiting, diarrhea, blurry vision, dysuria, lightheadedness, dizziness.    ADDITIONAL REVIEW OF SYSTEMS:    MEDICATIONS  (STANDING):  ceFAZolin   IVPB 2000 milliGRAM(s) IV Intermittent every 8 hours  ceFAZolin   IVPB      chlorhexidine 4% Liquid 1 Application(s) Topical <User Schedule>  cyanocobalamin 1000 MICROGram(s) Oral daily  furosemide    Tablet 20 milliGRAM(s) Oral <User Schedule>  lidocaine 1% Injectable 50 milliLiter(s) Local Injection once  pantoprazole    Tablet 40 milliGRAM(s) Oral before breakfast  polyethylene glycol 3350 17 Gram(s) Oral every 12 hours  predniSONE   Tablet 10 milliGRAM(s) Oral daily  Remodulin (Treprostinil) 5mG/mL 1 Vial(s) 1 Vial(s) SubCutaneous Continuous Pump  senna 2 Tablet(s) Oral at bedtime  sildenafil (REVATIO) 20 milliGRAM(s) Oral <User Schedule>  spironolactone 25 milliGRAM(s) Oral daily    MEDICATIONS  (PRN):  acetaminophen     Tablet .. 650 milliGRAM(s) Oral every 6 hours PRN Mild Pain (1 - 3)  aluminum hydroxide/magnesium hydroxide/simethicone Suspension 30 milliLiter(s) Oral every 4 hours PRN Dyspepsia  artificial  tears Solution 1 Drop(s) Both EYES three times a day PRN Dry Eyes  HYDROmorphone  Injectable 0.5 milliGRAM(s) IV Push every 4 hours PRN Pain  ipratropium    for Nebulization 500 MICROGram(s) Nebulizer every 6 hours PRN Bronchospasm  melatonin 3 milliGRAM(s) Oral at bedtime PRN Insomnia  ondansetron Injectable 4 milliGRAM(s) IV Push every 8 hours PRN Nausea and/or Vomiting  sodium chloride 0.65% Nasal 1 Spray(s) Both Nostrils every 6 hours PRN Nasal Congestion  sodium chloride 0.9% lock flush 10 milliLiter(s) IV Push every 1 hour PRN Pre/post blood products, medications, blood draw, and to maintain line patency      CAPILLARY BLOOD GLUCOSE        I&O's Summary    08 Sep 2022 07:01  -  09 Sep 2022 07:00  --------------------------------------------------------  IN: 450 mL / OUT: 1500 mL / NET: -1050 mL        PHYSICAL EXAM:  Vital Signs Last 24 Hrs  T(C): 36.7 (09 Sep 2022 04:51), Max: 36.7 (08 Sep 2022 08:59)  T(F): 98.1 (09 Sep 2022 04:51), Max: 98.1 (09 Sep 2022 04:51)  HR: 92 (09 Sep 2022 06:55) (85 - 102)  BP: 100/60 (09 Sep 2022 06:55) (94/60 - 108/68)  BP(mean): --  RR: 18 (09 Sep 2022 04:51) (18 - 20)  SpO2: 100% (09 Sep 2022 04:51) (97% - 100%)    Parameters below as of 09 Sep 2022 04:51  Patient On (Oxygen Delivery Method): nasal cannula  O2 Flow (L/min): 2        GENERAL: No apparent distress.   HEAD:  Atraumatic, Normocephalic  EYES: EOMI, PERRLA, conjunctiva and sclera clear  NECK: Supple, no lymphadenopathy, no JVD  CHEST/LUNG: Clear to auscultation bilateral and symmetric; No wheezes, rales, or rhonchi  HEART: S1 and S2 normal. Regular rate and rhythm; No murmurs, rubs, or gallops  ABDOMEN: Soft, non-tender, non-distended; normal bowel sounds. Fluctuance, warmth at RUQ with retained subcutaneous pump attachment, no warmth, erythema, fluctuance at site of subq port on left.  EXTREMITIES: LLE hematoma near groin, TTP. 2+ peripheral pulses b/l, No clubbing, cyanosis, or edema  NEUROLOGY: A&O x 3, no focal deficits  SKIN: No rashes or lesions      LABS:                        11.6   13.73 )-----------( 292      ( 08 Sep 2022 04:26 )             37.1     09-08    133<L>  |  97  |  11  ----------------------------<  89  4.1   |  23  |  0.88    Ca    8.9      08 Sep 2022 04:26  Phos  4.2     09-08  Mg     1.8     09-08    TPro  6.9  /  Alb  3.6  /  TBili  0.8  /  DBili  x   /  AST  23  /  ALT  79<H>  /  AlkPhos  60  09-08    PTT - ( 08 Sep 2022 04:26 )  PTT:36.1 sec          Culture - Blood (collected 07 Sep 2022 17:37)  Source: .Blood Blood-Venous  Preliminary Report (08 Sep 2022 23:01):    No growth to date.    Culture - Blood (collected 07 Sep 2022 17:25)  Source: .Blood Blood-Venous  Preliminary Report (08 Sep 2022 23:01):    No growth to date.        RADIOLOGY & ADDITIONAL TESTS:  Lab Results Reviewed   Imaging Reviewed  Electrocardiogram Reviewed   PROGRESS NOTE:   Authored by Alfonso Simons MD   Patient is a 42y old  Female who presents with a chief complaint of Palpitations (08 Sep 2022 22:14)      SUBJECTIVE / OVERNIGHT EVENTS: Patient had I&D of rt abdominal wall abscess. Patient reporting left groin/leg pain is the same.  No chest pain,  palpitations, dyspnea, abdominal pain. nausea, vomiting, diarrhea, blurry vision, dysuria, lightheadedness, dizziness.    ADDITIONAL REVIEW OF SYSTEMS:    MEDICATIONS  (STANDING):  ceFAZolin   IVPB 2000 milliGRAM(s) IV Intermittent every 8 hours  ceFAZolin   IVPB      chlorhexidine 4% Liquid 1 Application(s) Topical <User Schedule>  cyanocobalamin 1000 MICROGram(s) Oral daily  furosemide    Tablet 20 milliGRAM(s) Oral <User Schedule>  lidocaine 1% Injectable 50 milliLiter(s) Local Injection once  pantoprazole    Tablet 40 milliGRAM(s) Oral before breakfast  polyethylene glycol 3350 17 Gram(s) Oral every 12 hours  predniSONE   Tablet 10 milliGRAM(s) Oral daily  Remodulin (Treprostinil) 5mG/mL 1 Vial(s) 1 Vial(s) SubCutaneous Continuous Pump  senna 2 Tablet(s) Oral at bedtime  sildenafil (REVATIO) 20 milliGRAM(s) Oral <User Schedule>  spironolactone 25 milliGRAM(s) Oral daily    MEDICATIONS  (PRN):  acetaminophen     Tablet .. 650 milliGRAM(s) Oral every 6 hours PRN Mild Pain (1 - 3)  aluminum hydroxide/magnesium hydroxide/simethicone Suspension 30 milliLiter(s) Oral every 4 hours PRN Dyspepsia  artificial  tears Solution 1 Drop(s) Both EYES three times a day PRN Dry Eyes  HYDROmorphone  Injectable 0.5 milliGRAM(s) IV Push every 4 hours PRN Pain  ipratropium    for Nebulization 500 MICROGram(s) Nebulizer every 6 hours PRN Bronchospasm  melatonin 3 milliGRAM(s) Oral at bedtime PRN Insomnia  ondansetron Injectable 4 milliGRAM(s) IV Push every 8 hours PRN Nausea and/or Vomiting  sodium chloride 0.65% Nasal 1 Spray(s) Both Nostrils every 6 hours PRN Nasal Congestion  sodium chloride 0.9% lock flush 10 milliLiter(s) IV Push every 1 hour PRN Pre/post blood products, medications, blood draw, and to maintain line patency      CAPILLARY BLOOD GLUCOSE        I&O's Summary    08 Sep 2022 07:01  -  09 Sep 2022 07:00  --------------------------------------------------------  IN: 450 mL / OUT: 1500 mL / NET: -1050 mL        PHYSICAL EXAM:  Vital Signs Last 24 Hrs  T(C): 36.7 (09 Sep 2022 04:51), Max: 36.7 (08 Sep 2022 08:59)  T(F): 98.1 (09 Sep 2022 04:51), Max: 98.1 (09 Sep 2022 04:51)  HR: 92 (09 Sep 2022 06:55) (85 - 102)  BP: 100/60 (09 Sep 2022 06:55) (94/60 - 108/68)  BP(mean): --  RR: 18 (09 Sep 2022 04:51) (18 - 20)  SpO2: 100% (09 Sep 2022 04:51) (97% - 100%)    Parameters below as of 09 Sep 2022 04:51  Patient On (Oxygen Delivery Method): nasal cannula  O2 Flow (L/min): 2        GENERAL: No apparent distress.   HEAD:  Atraumatic, Normocephalic  EYES: EOMI, PERRLA, conjunctiva and sclera clear  NECK: Supple, no lymphadenopathy, no JVD  CHEST/LUNG: Clear to auscultation bilateral and symmetric; No wheezes, rales, or rhonchi  HEART: S1 and S2 normal. Regular rate and rhythm; No murmurs, rubs, or gallops  ABDOMEN: Soft, non-tender, non-distended; normal bowel sounds. Clean, dry dressing on rt anterior abdominal wall.  EXTREMITIES: LLE hematoma near groin, TTP. 2+ peripheral pulses b/l, No clubbing, cyanosis, or edema  NEUROLOGY: A&O x 3, no focal deficits  SKIN: No rashes or lesions      LABS:                        11.6   13.73 )-----------( 292      ( 08 Sep 2022 04:26 )             37.1     09-08    133<L>  |  97  |  11  ----------------------------<  89  4.1   |  23  |  0.88    Ca    8.9      08 Sep 2022 04:26  Phos  4.2     09-08  Mg     1.8     09-08    TPro  6.9  /  Alb  3.6  /  TBili  0.8  /  DBili  x   /  AST  23  /  ALT  79<H>  /  AlkPhos  60  09-08    PTT - ( 08 Sep 2022 04:26 )  PTT:36.1 sec          Culture - Blood (collected 07 Sep 2022 17:37)  Source: .Blood Blood-Venous  Preliminary Report (08 Sep 2022 23:01):    No growth to date.    Culture - Blood (collected 07 Sep 2022 17:25)  Source: .Blood Blood-Venous  Preliminary Report (08 Sep 2022 23:01):    No growth to date.        RADIOLOGY & ADDITIONAL TESTS:  Lab Results Reviewed   Imaging Reviewed  Electrocardiogram Reviewed

## 2022-09-09 NOTE — PROGRESS NOTE ADULT - PROBLEM SELECTOR PLAN 5
- hyperdynamic LVSF, new echogenic mass in RA / clot s/p cath directed thrombus aspiration ib 9/3 c/b L common femoral artery pseudoaneurysm, 9/6 repeat duplex- found +hematoma w/o pseudoaneurysm f/u by vascular sx   - Holding heparin gtt iso of new LLE hematoma, - hyperdynamic LVSF, new echogenic mass in RA / clot s/p cath directed thrombus aspiration ib 9/3 c/b L common femoral artery pseudoaneurysm, 9/6 repeat duplex- found +hematoma w/o pseudoaneurysm f/u by vascular sx   - heparin gtt

## 2022-09-09 NOTE — PROGRESS NOTE ADULT - SUBJECTIVE AND OBJECTIVE BOX
Lung Transplant Progress Note  =====================================================  HPI:  42F PMH PE on xarelto, pHTN, ILD, JULIA, right sided heart failure, presents to the hospital for 2 days of palpitations. Patient reports she has had similar episodes of palpitations in the past, however workup in the past was unremarkable. Patient also reporting mild orthopnea. Denies chest pain. Denies diaphoresis. Denies recent use of stimulants including caffeine. Patient also reporting severe nausea and vomiting which has resolved with zofran in the ED. ROS otherwise unremarkable.    ED course: VS with tachycardia. CBC with anemia. CMP normal. BNP 7646. Troponin T normal. EKG with sinus tachycardia, CXR without consolidation. CT chest noncon revealed pulmonary artery dilations likely secondary to pulmonary hypertension. Patient now for admission to medicine for further evaluation and treatment.  (28 Aug 2022 15:36)      24 hour events: overall feeling well is on RA today at rest, she has been mobilizing but unable to walk due to L groin hematoma    PAST MEDICAL & SURGICAL HISTORY:  Tachycardia      Anemia      Pulmonary hypertension      Pulmonary embolism      Asthma  On home O2 nightly at 2L via NC      PE (pulmonary thromboembolism)  on Xarelto 10 mg daily      Sinusitis      Corneal disorder      Right heart failure      CAD (coronary artery disease)      H/O pulmonary hypertension      Pulmonary embolism      Asthma      History of tachycardia      On supplemental oxygen by nasal cannula  O2 @ 2L      Pacemaker      Skin mass  left upper thigh mass      History of tubal ligation      Pacemaker      H/O tubal ligation      History of pacemaker  12/19 - [employer] model Ingevity 4469        Home Meds: Home Medications:  Atrovent 500 mcg/2.5 mL inhalation solution: 2.5 milliliter(s) inhaled , As Needed (29 Aug 2022 12:31)  Atrovent HFA 17 mcg/inh inhalation aerosol: puff(s) inhaled , As Needed (29 Aug 2022 12:31)  Lasix 20 mg oral tablet: 1 tab(s) orally every other day (29 Aug 2022 12:31)  omeprazole 40 mg oral delayed release capsule: 1 cap(s) orally once a day (29 Aug 2022 12:31)  predniSONE: 10 milligram(s) orally once a day (29 Aug 2022 12:31)  Remodulin 5 mg/mL injectable solution: patient is taking 42ng/kg/min via her own SQ PUMP (29 Aug 2022 12:31)  sildenafil 20 mg oral tablet: 0.5 tab(s) orally once a day (29 Aug 2022 12:31)  Vitamin B12 1000 mcg oral tablet: 1 tab(s) orally once a day (29 Aug 2022 12:31)  Xarelto 10 mg oral tablet: 1 tab(s) orally once a day (29 Aug 2022 12:31)    Allergies: Allergies    adhesives (Rash)  penicillin (Rash)  vancomycin (Rash)    Intolerances    albuterol (Other; Rash)    Soc:   Advanced Directives: Presumed Full Code     ROS:    REVIEW OF SYSTEMS    [ ] A ten-point review of systems was otherwise negative except as noted.  [ ] Due to altered mental status/intubation, subjective information were not able to be obtained from the patient. History was obtained, to the extent possible, from review of the chart and collateral sources of information.      CURRENT MEDICATIONS:   --------------------------------------------------------------------------------------  Neurologic Medications  acetaminophen     Tablet .. 650 milliGRAM(s) Oral every 6 hours PRN Mild Pain (1 - 3)  HYDROmorphone  Injectable 0.5 milliGRAM(s) IV Push every 4 hours PRN Pain  melatonin 3 milliGRAM(s) Oral at bedtime PRN Insomnia  ondansetron Injectable 4 milliGRAM(s) IV Push every 8 hours PRN Nausea and/or Vomiting    Respiratory Medications  ipratropium    for Nebulization 500 MICROGram(s) Nebulizer every 6 hours PRN Bronchospasm    Cardiovascular Medications  furosemide    Tablet 20 milliGRAM(s) Oral <User Schedule>  sildenafil (REVATIO) 20 milliGRAM(s) Oral <User Schedule>  spironolactone 25 milliGRAM(s) Oral daily    Gastrointestinal Medications  aluminum hydroxide/magnesium hydroxide/simethicone Suspension 30 milliLiter(s) Oral every 4 hours PRN Dyspepsia  cyanocobalamin 1000 MICROGram(s) Oral daily  pantoprazole    Tablet 40 milliGRAM(s) Oral before breakfast  polyethylene glycol 3350 17 Gram(s) Oral every 12 hours  senna 2 Tablet(s) Oral at bedtime  sodium chloride 0.9% lock flush 10 milliLiter(s) IV Push every 1 hour PRN Pre/post blood products, medications, blood draw, and to maintain line patency    Genitourinary Medications    Hematologic/Oncologic Medications    Antimicrobial/Immunologic Medications  ceFAZolin   IVPB 2000 milliGRAM(s) IV Intermittent every 8 hours  ceFAZolin   IVPB        Endocrine/Metabolic Medications  predniSONE   Tablet 10 milliGRAM(s) Oral daily    Topical/Other Medications  artificial  tears Solution 1 Drop(s) Both EYES three times a day PRN Dry Eyes  chlorhexidine 4% Liquid 1 Application(s) Topical <User Schedule>  lidocaine 1% Injectable 50 milliLiter(s) Local Injection once  Remodulin (Treprostinil) 5mG/mL 1 Vial(s) 1 Vial(s) SubCutaneous Continuous Pump  sodium chloride 0.65% Nasal 1 Spray(s) Both Nostrils every 6 hours PRN Nasal Congestion    --------------------------------------------------------------------------------------    VITAL SIGNS, INS/OUTS (last 24 hours):  --------------------------------------------------------------------------------------  ICU Vital Signs Last 24 Hrs  T(C): 36.7 (09 Sep 2022 04:51), Max: 36.7 (08 Sep 2022 16:03)  T(F): 98.1 (09 Sep 2022 04:51), Max: 98.1 (09 Sep 2022 04:51)  HR: 92 (09 Sep 2022 06:55) (89 - 102)  BP: 100/60 (09 Sep 2022 06:55) (95/61 - 108/68)  BP(mean): --  ABP: --  ABP(mean): --  RR: 18 (09 Sep 2022 04:51) (18 - 20)  SpO2: 100% (09 Sep 2022 04:51) (97% - 100%)    O2 Parameters below as of 09 Sep 2022 04:51  Patient On (Oxygen Delivery Method): nasal cannula  O2 Flow (L/min): 2        I&O's Summary    08 Sep 2022 07:01  -  09 Sep 2022 07:00  --------------------------------------------------------  IN: 450 mL / OUT: 1500 mL / NET: -1050 mL    09 Sep 2022 07:01  -  09 Sep 2022 10:20  --------------------------------------------------------  IN: 350 mL / OUT: 0 mL / NET: 350 mL      --------------------------------------------------------------------------------------    EXAM:  General/Neuro  Exam: Normal, NAD, alert, oriented x 3, no focal deficits.     Respiratory  Exam: Lungs clear auscultation, Normal expansion/effort.         Cardiovascular  Exam: S1, S2.  Regular rate and rhythm.    GI  Exam: Abdomen soft, Non-tender, Non-distended.      Tubes/Lines/Drains    [x] Peripheral IV  [] Central Venous Line     	[] R	[] L	[] IJ	[] Fem	[] SC        Type:	    Date Placed:   [] Arterial Line		[] R	[] L	[] Fem	[] Rad	[] Ax	Date Placed:   [] PICC:         	[] Midline		[] Mediport           [] Urinary Catheter		    Extremities  Exam: Extremities warm, pink, well-perfused.      Derm:  Exam: Good skin turgor, no skin breakdown.      :   Exam: prima fit    LABS  --------------------------------------------------------------------------------------  Labs:  CAPILLARY BLOOD GLUCOSE                              11.6   13.73 )-----------( 292      ( 08 Sep 2022 04:26 )             37.1         09-08    133<L>  |  97  |  11  ----------------------------<  89  4.1   |  23  |  0.88          LFTs:             6.9  | 0.8  | 23       ------------------[60      ( 08 Sep 2022 04:26 )  3.6  | x    | 79          Lipase:x      Amylase:x         Blood Gas Venous - Lactate: 0.7 mmol/L (09-07-22 @ 02:25)  Blood Gas Venous - Lactate: 1.0 mmol/L (09-07-22 @ 00:05)    ABG - ( 04 Sep 2022 23:03 )  pH: 7.52  /  pCO2: 36    /  pO2: 82    / HCO3: 29    / Base Excess: 6.3   /  SaO2: 97.1            ABG - ( 04 Sep 2022 12:33 )  pH: 7.51  /  pCO2: 34    /  pO2: 79    / HCO3: 27    / Base Excess: 4.2   /  SaO2: 97.9            ABG - ( 03 Sep 2022 12:29 )  pH: 7.50  /  pCO2: 39    /  pO2: 113   / HCO3: 30    / Base Excess: 6.7   /  SaO2: 99.5              Coags:     x      ----< x       ( 08 Sep 2022 04:26 )     36.1            Serum Pro-Brain Natriuretic Peptide: 71 pg/mL (09-02-22 @ 00:22)          Culture - Blood (collected 07 Sep 2022 17:37)  Source: .Blood Blood-Venous  Preliminary Report (08 Sep 2022 23:01):    No growth to date.    Culture - Blood (collected 07 Sep 2022 17:25)  Source: .Blood Blood-Venous  Preliminary Report (08 Sep 2022 23:01):    No growth to date.        --------------------------------------------------------------------------------------    OTHER LABS    IMAGING RESULTS     Lung Transplant Progress Note  =====================================================  HPI:  42F PMH PE on xarelto, pHTN, ILD, JULIA, right sided heart failure, presents to the hospital for 2 days of palpitations. Patient reports she has had similar episodes of palpitations in the past, however workup in the past was unremarkable. Patient also reporting mild orthopnea. Denies chest pain. Denies diaphoresis. Denies recent use of stimulants including caffeine. Patient also reporting severe nausea and vomiting which has resolved with zofran in the ED. ROS otherwise unremarkable.    ED course: VS with tachycardia. CBC with anemia. CMP normal. BNP 7646. Troponin T normal. EKG with sinus tachycardia, CXR without consolidation. CT chest noncon revealed pulmonary artery dilations likely secondary to pulmonary hypertension. Patient now for admission to medicine for further evaluation and treatment.  (28 Aug 2022 15:36)      24 hour events: overall feeling well is on RA today at rest, she has been mobilizing but unable to walk due to L groin hematoma    PAST MEDICAL & SURGICAL HISTORY:  Tachycardia      Anemia      Pulmonary hypertension      Pulmonary embolism      Asthma  On home O2 nightly at 2L via NC      PE (pulmonary thromboembolism)  on Xarelto 10 mg daily      Sinusitis      Corneal disorder      Right heart failure      CAD (coronary artery disease)      H/O pulmonary hypertension      Pulmonary embolism      Asthma      History of tachycardia      On supplemental oxygen by nasal cannula  O2 @ 2L      Pacemaker      Skin mass  left upper thigh mass      History of tubal ligation      Pacemaker      H/O tubal ligation      History of pacemaker  12/19 - [employer] model Ingevity 4469        Home Meds: Home Medications:  Atrovent 500 mcg/2.5 mL inhalation solution: 2.5 milliliter(s) inhaled , As Needed (29 Aug 2022 12:31)  Atrovent HFA 17 mcg/inh inhalation aerosol: puff(s) inhaled , As Needed (29 Aug 2022 12:31)  Lasix 20 mg oral tablet: 1 tab(s) orally every other day (29 Aug 2022 12:31)  omeprazole 40 mg oral delayed release capsule: 1 cap(s) orally once a day (29 Aug 2022 12:31)  predniSONE: 10 milligram(s) orally once a day (29 Aug 2022 12:31)  Remodulin 5 mg/mL injectable solution: patient is taking 42ng/kg/min via her own SQ PUMP (29 Aug 2022 12:31)  sildenafil 20 mg oral tablet: 0.5 tab(s) orally once a day (29 Aug 2022 12:31)  Vitamin B12 1000 mcg oral tablet: 1 tab(s) orally once a day (29 Aug 2022 12:31)  Xarelto 10 mg oral tablet: 1 tab(s) orally once a day (29 Aug 2022 12:31)    Allergies: Allergies    adhesives (Rash)  penicillin (Rash)  vancomycin (Rash)    Intolerances    albuterol (Other; Rash)    Soc:   Advanced Directives: Presumed Full Code     ROS:    REVIEW OF SYSTEMS    [xx ] A ten-point review of systems was otherwise negative except as noted.  [ ] Due to altered mental status/intubation, subjective information were not able to be obtained from the patient. History was obtained, to the extent possible, from review of the chart and collateral sources of information.      CURRENT MEDICATIONS:   --------------------------------------------------------------------------------------  Neurologic Medications  acetaminophen     Tablet .. 650 milliGRAM(s) Oral every 6 hours PRN Mild Pain (1 - 3)  HYDROmorphone  Injectable 0.5 milliGRAM(s) IV Push every 4 hours PRN Pain  melatonin 3 milliGRAM(s) Oral at bedtime PRN Insomnia  ondansetron Injectable 4 milliGRAM(s) IV Push every 8 hours PRN Nausea and/or Vomiting    Respiratory Medications  ipratropium    for Nebulization 500 MICROGram(s) Nebulizer every 6 hours PRN Bronchospasm    Cardiovascular Medications  furosemide    Tablet 20 milliGRAM(s) Oral <User Schedule>  sildenafil (REVATIO) 20 milliGRAM(s) Oral <User Schedule>  spironolactone 25 milliGRAM(s) Oral daily    Gastrointestinal Medications  aluminum hydroxide/magnesium hydroxide/simethicone Suspension 30 milliLiter(s) Oral every 4 hours PRN Dyspepsia  cyanocobalamin 1000 MICROGram(s) Oral daily  pantoprazole    Tablet 40 milliGRAM(s) Oral before breakfast  polyethylene glycol 3350 17 Gram(s) Oral every 12 hours  senna 2 Tablet(s) Oral at bedtime  sodium chloride 0.9% lock flush 10 milliLiter(s) IV Push every 1 hour PRN Pre/post blood products, medications, blood draw, and to maintain line patency    Genitourinary Medications    Hematologic/Oncologic Medications    Antimicrobial/Immunologic Medications  ceFAZolin   IVPB 2000 milliGRAM(s) IV Intermittent every 8 hours  ceFAZolin   IVPB        Endocrine/Metabolic Medications  predniSONE   Tablet 10 milliGRAM(s) Oral daily    Topical/Other Medications  artificial  tears Solution 1 Drop(s) Both EYES three times a day PRN Dry Eyes  chlorhexidine 4% Liquid 1 Application(s) Topical <User Schedule>  lidocaine 1% Injectable 50 milliLiter(s) Local Injection once  Remodulin (Treprostinil) 5mG/mL 1 Vial(s) 1 Vial(s) SubCutaneous Continuous Pump  sodium chloride 0.65% Nasal 1 Spray(s) Both Nostrils every 6 hours PRN Nasal Congestion    --------------------------------------------------------------------------------------    VITAL SIGNS, INS/OUTS (last 24 hours):  --------------------------------------------------------------------------------------  ICU Vital Signs Last 24 Hrs  T(C): 36.7 (09 Sep 2022 04:51), Max: 36.7 (08 Sep 2022 16:03)  T(F): 98.1 (09 Sep 2022 04:51), Max: 98.1 (09 Sep 2022 04:51)  HR: 92 (09 Sep 2022 06:55) (89 - 102)  BP: 100/60 (09 Sep 2022 06:55) (95/61 - 108/68)  BP(mean): --  ABP: --  ABP(mean): --  RR: 18 (09 Sep 2022 04:51) (18 - 20)  SpO2: 100% (09 Sep 2022 04:51) (97% - 100%)    O2 Parameters below as of 09 Sep 2022 04:51  Patient On (Oxygen Delivery Method): nasal cannula  O2 Flow (L/min): 2        I&O's Summary    08 Sep 2022 07:01  -  09 Sep 2022 07:00  --------------------------------------------------------  IN: 450 mL / OUT: 1500 mL / NET: -1050 mL    09 Sep 2022 07:01  -  09 Sep 2022 10:20  --------------------------------------------------------  IN: 350 mL / OUT: 0 mL / NET: 350 mL      --------------------------------------------------------------------------------------    EXAM:  General/Neuro  Exam: Normal, NAD, alert, oriented x 3, no focal deficits.     Respiratory  Exam: Lungs clear auscultation, Normal expansion/effort.         Cardiovascular  Exam: S1, S2.  Regular rate and rhythm.    GI  Exam: Abdomen soft, Non-tender, Non-distended.      Tubes/Lines/Drains    [x] Peripheral IV  [] Central Venous Line     	[] R	[] L	[] IJ	[] Fem	[] SC        Type:	    Date Placed:   [] Arterial Line		[] R	[] L	[] Fem	[] Rad	[] Ax	Date Placed:   [] PICC:         	[] Midline		[] Mediport           [] Urinary Catheter		    Extremities  Exam: Extremities warm, pink, well-perfused.      Derm:  Exam: Good skin turgor, no skin breakdown.      :   Exam: prima fit    LABS  --------------------------------------------------------------------------------------  Labs:  CAPILLARY BLOOD GLUCOSE                              11.6   13.73 )-----------( 292      ( 08 Sep 2022 04:26 )             37.1         09-08    133<L>  |  97  |  11  ----------------------------<  89  4.1   |  23  |  0.88          LFTs:             6.9  | 0.8  | 23       ------------------[60      ( 08 Sep 2022 04:26 )  3.6  | x    | 79          Lipase:x      Amylase:x         Blood Gas Venous - Lactate: 0.7 mmol/L (09-07-22 @ 02:25)  Blood Gas Venous - Lactate: 1.0 mmol/L (09-07-22 @ 00:05)    ABG - ( 04 Sep 2022 23:03 )  pH: 7.52  /  pCO2: 36    /  pO2: 82    / HCO3: 29    / Base Excess: 6.3   /  SaO2: 97.1            ABG - ( 04 Sep 2022 12:33 )  pH: 7.51  /  pCO2: 34    /  pO2: 79    / HCO3: 27    / Base Excess: 4.2   /  SaO2: 97.9            ABG - ( 03 Sep 2022 12:29 )  pH: 7.50  /  pCO2: 39    /  pO2: 113   / HCO3: 30    / Base Excess: 6.7   /  SaO2: 99.5              Coags:     x      ----< x       ( 08 Sep 2022 04:26 )     36.1            Serum Pro-Brain Natriuretic Peptide: 71 pg/mL (09-02-22 @ 00:22)          Culture - Blood (collected 07 Sep 2022 17:37)  Source: .Blood Blood-Venous  Preliminary Report (08 Sep 2022 23:01):    No growth to date.    Culture - Blood (collected 07 Sep 2022 17:25)  Source: .Blood Blood-Venous  Preliminary Report (08 Sep 2022 23:01):    No growth to date.        --------------------------------------------------------------------------------------    OTHER LABS    IMAGING RESULTS

## 2022-09-09 NOTE — CHART NOTE - NSCHARTNOTEFT_GEN_A_CORE
Duplex reviewed and patient has superficial vein thrombosis of radial and cephalic veins which don't require anticoagulation. AC management per primary team regarding groin okay from vascular perspective to continue AC. Please call with any other questions.     [id number]

## 2022-09-09 NOTE — PROGRESS NOTE ADULT - PROBLEM SELECTOR PLAN 1
Acute on chronic exacerbation of group I/IV pulmonary HTN with RHF  - ipratropium q6 PRN  - HOB > 30 degrees  - sildenafil 20 mg TID  - aldactone 25 mg PO QD  - Treprostinil SQ  - f/u pulm recommendations  - f/u lung transplant evaluation

## 2022-09-09 NOTE — PROGRESS NOTE ADULT - ATTENDING COMMENTS
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 EDOUARD likely secondary to venous congestion- resolved.     Now transferred out of the ICU.     Staph lugdunensis bacteremia- continue Ancef. Subcutaneous abscess secondary to device- I&D done.     High risk for MYRIAM- deferred.     Worsening groin hematoma- heparin held initially, restarted today as found to have R cephalic v thrombosis. Monitor.     PICC to be placed.

## 2022-09-10 LAB
ALBUMIN SERPL ELPH-MCNC: 3.5 G/DL — SIGNIFICANT CHANGE UP (ref 3.3–5)
ALP SERPL-CCNC: 57 U/L — SIGNIFICANT CHANGE UP (ref 40–120)
ALT FLD-CCNC: 41 U/L — SIGNIFICANT CHANGE UP (ref 10–45)
ANION GAP SERPL CALC-SCNC: 10 MMOL/L — SIGNIFICANT CHANGE UP (ref 5–17)
APTT BLD: 68.8 SEC — HIGH (ref 27.5–35.5)
APTT BLD: 79.6 SEC — HIGH (ref 27.5–35.5)
AST SERPL-CCNC: 25 U/L — SIGNIFICANT CHANGE UP (ref 10–40)
BILIRUB SERPL-MCNC: 0.6 MG/DL — SIGNIFICANT CHANGE UP (ref 0.2–1.2)
BUN SERPL-MCNC: 12 MG/DL — SIGNIFICANT CHANGE UP (ref 7–23)
CALCIUM SERPL-MCNC: 8.5 MG/DL — SIGNIFICANT CHANGE UP (ref 8.4–10.5)
CHLORIDE SERPL-SCNC: 100 MMOL/L — SIGNIFICANT CHANGE UP (ref 96–108)
CO2 SERPL-SCNC: 23 MMOL/L — SIGNIFICANT CHANGE UP (ref 22–31)
CREAT SERPL-MCNC: 0.9 MG/DL — SIGNIFICANT CHANGE UP (ref 0.5–1.3)
EGFR: 82 ML/MIN/1.73M2 — SIGNIFICANT CHANGE UP
GLUCOSE SERPL-MCNC: 140 MG/DL — HIGH (ref 70–99)
HCT VFR BLD CALC: 33.2 % — LOW (ref 34.5–45)
HCT VFR BLD CALC: 34.4 % — LOW (ref 34.5–45)
HCT VFR BLD CALC: 38.4 % — SIGNIFICANT CHANGE UP (ref 34.5–45)
HGB BLD-MCNC: 10.3 G/DL — LOW (ref 11.5–15.5)
HGB BLD-MCNC: 10.7 G/DL — LOW (ref 11.5–15.5)
HGB BLD-MCNC: 11.9 G/DL — SIGNIFICANT CHANGE UP (ref 11.5–15.5)
MAGNESIUM SERPL-MCNC: 1.8 MG/DL — SIGNIFICANT CHANGE UP (ref 1.6–2.6)
MCHC RBC-ENTMCNC: 22.5 PG — LOW (ref 27–34)
MCHC RBC-ENTMCNC: 22.6 PG — LOW (ref 27–34)
MCHC RBC-ENTMCNC: 22.8 PG — LOW (ref 27–34)
MCHC RBC-ENTMCNC: 31 GM/DL — LOW (ref 32–36)
MCHC RBC-ENTMCNC: 31 GM/DL — LOW (ref 32–36)
MCHC RBC-ENTMCNC: 31.1 GM/DL — LOW (ref 32–36)
MCV RBC AUTO: 72.6 FL — LOW (ref 80–100)
MCV RBC AUTO: 72.7 FL — LOW (ref 80–100)
MCV RBC AUTO: 73.7 FL — LOW (ref 80–100)
NRBC # BLD: 0 /100 WBCS — SIGNIFICANT CHANGE UP (ref 0–0)
PHOSPHATE SERPL-MCNC: 2.8 MG/DL — SIGNIFICANT CHANGE UP (ref 2.5–4.5)
PLATELET # BLD AUTO: 315 K/UL — SIGNIFICANT CHANGE UP (ref 150–400)
PLATELET # BLD AUTO: 326 K/UL — SIGNIFICANT CHANGE UP (ref 150–400)
PLATELET # BLD AUTO: 360 K/UL — SIGNIFICANT CHANGE UP (ref 150–400)
POTASSIUM SERPL-MCNC: 4.5 MMOL/L — SIGNIFICANT CHANGE UP (ref 3.5–5.3)
POTASSIUM SERPL-SCNC: 4.5 MMOL/L — SIGNIFICANT CHANGE UP (ref 3.5–5.3)
PROT SERPL-MCNC: 6.7 G/DL — SIGNIFICANT CHANGE UP (ref 6–8.3)
RBC # BLD: 4.57 M/UL — SIGNIFICANT CHANGE UP (ref 3.8–5.2)
RBC # BLD: 4.73 M/UL — SIGNIFICANT CHANGE UP (ref 3.8–5.2)
RBC # BLD: 5.21 M/UL — HIGH (ref 3.8–5.2)
RBC # FLD: 18.3 % — HIGH (ref 10.3–14.5)
RBC # FLD: 18.5 % — HIGH (ref 10.3–14.5)
RBC # FLD: 18.7 % — HIGH (ref 10.3–14.5)
SARS-COV-2 RNA SPEC QL NAA+PROBE: SIGNIFICANT CHANGE UP
SODIUM SERPL-SCNC: 133 MMOL/L — LOW (ref 135–145)
WBC # BLD: 12.87 K/UL — HIGH (ref 3.8–10.5)
WBC # BLD: 13.92 K/UL — HIGH (ref 3.8–10.5)
WBC # BLD: 14.63 K/UL — HIGH (ref 3.8–10.5)
WBC # FLD AUTO: 12.87 K/UL — HIGH (ref 3.8–10.5)
WBC # FLD AUTO: 13.92 K/UL — HIGH (ref 3.8–10.5)
WBC # FLD AUTO: 14.63 K/UL — HIGH (ref 3.8–10.5)

## 2022-09-10 PROCEDURE — 99232 SBSQ HOSP IP/OBS MODERATE 35: CPT

## 2022-09-10 PROCEDURE — 99233 SBSQ HOSP IP/OBS HIGH 50: CPT

## 2022-09-10 RX ORDER — MAGNESIUM SULFATE 500 MG/ML
1 VIAL (ML) INJECTION ONCE
Refills: 0 | Status: COMPLETED | OUTPATIENT
Start: 2022-09-10 | End: 2022-09-10

## 2022-09-10 RX ADMIN — PREGABALIN 1000 MICROGRAM(S): 225 CAPSULE ORAL at 12:35

## 2022-09-10 RX ADMIN — Medication 20 MILLIGRAM(S): at 15:33

## 2022-09-10 RX ADMIN — HEPARIN SODIUM 1400 UNIT(S)/HR: 5000 INJECTION INTRAVENOUS; SUBCUTANEOUS at 02:11

## 2022-09-10 RX ADMIN — Medication 20 MILLIGRAM(S): at 20:41

## 2022-09-10 RX ADMIN — HEPARIN SODIUM 1400 UNIT(S)/HR: 5000 INJECTION INTRAVENOUS; SUBCUTANEOUS at 19:22

## 2022-09-10 RX ADMIN — HYDROMORPHONE HYDROCHLORIDE 0.5 MILLIGRAM(S): 2 INJECTION INTRAMUSCULAR; INTRAVENOUS; SUBCUTANEOUS at 14:00

## 2022-09-10 RX ADMIN — Medication 10 MILLIGRAM(S): at 05:35

## 2022-09-10 RX ADMIN — Medication 20 MILLIGRAM(S): at 05:34

## 2022-09-10 RX ADMIN — Medication 100 GRAM(S): at 13:39

## 2022-09-10 RX ADMIN — CHLORHEXIDINE GLUCONATE 1 APPLICATION(S): 213 SOLUTION TOPICAL at 05:35

## 2022-09-10 RX ADMIN — HYDROMORPHONE HYDROCHLORIDE 0.5 MILLIGRAM(S): 2 INJECTION INTRAMUSCULAR; INTRAVENOUS; SUBCUTANEOUS at 08:52

## 2022-09-10 RX ADMIN — HYDROMORPHONE HYDROCHLORIDE 0.5 MILLIGRAM(S): 2 INJECTION INTRAMUSCULAR; INTRAVENOUS; SUBCUTANEOUS at 09:10

## 2022-09-10 RX ADMIN — SPIRONOLACTONE 25 MILLIGRAM(S): 25 TABLET, FILM COATED ORAL at 08:30

## 2022-09-10 RX ADMIN — PANTOPRAZOLE SODIUM 40 MILLIGRAM(S): 20 TABLET, DELAYED RELEASE ORAL at 05:32

## 2022-09-10 RX ADMIN — HYDROMORPHONE HYDROCHLORIDE 0.5 MILLIGRAM(S): 2 INJECTION INTRAMUSCULAR; INTRAVENOUS; SUBCUTANEOUS at 20:36

## 2022-09-10 RX ADMIN — Medication 100 MILLIGRAM(S): at 02:52

## 2022-09-10 RX ADMIN — HEPARIN SODIUM 1400 UNIT(S)/HR: 5000 INJECTION INTRAVENOUS; SUBCUTANEOUS at 11:12

## 2022-09-10 RX ADMIN — Medication 100 MILLIGRAM(S): at 17:53

## 2022-09-10 RX ADMIN — HYDROMORPHONE HYDROCHLORIDE 0.5 MILLIGRAM(S): 2 INJECTION INTRAMUSCULAR; INTRAVENOUS; SUBCUTANEOUS at 20:51

## 2022-09-10 RX ADMIN — HYDROMORPHONE HYDROCHLORIDE 0.5 MILLIGRAM(S): 2 INJECTION INTRAMUSCULAR; INTRAVENOUS; SUBCUTANEOUS at 00:05

## 2022-09-10 RX ADMIN — Medication 1 TABLET(S): at 12:35

## 2022-09-10 RX ADMIN — HEPARIN SODIUM 1400 UNIT(S)/HR: 5000 INJECTION INTRAVENOUS; SUBCUTANEOUS at 12:36

## 2022-09-10 RX ADMIN — SENNA PLUS 2 TABLET(S): 8.6 TABLET ORAL at 20:39

## 2022-09-10 RX ADMIN — Medication 100 MILLIGRAM(S): at 09:44

## 2022-09-10 RX ADMIN — HYDROMORPHONE HYDROCHLORIDE 0.5 MILLIGRAM(S): 2 INJECTION INTRAMUSCULAR; INTRAVENOUS; SUBCUTANEOUS at 13:39

## 2022-09-10 NOTE — PROGRESS NOTE ADULT - PROBLEM SELECTOR PLAN 3
RUE US showing DVT in a radial vein at the distal upper arm and cephalic vein thrombus at the proximal upper arm.  - Will restart heparin gtt RUE US showing DVT in a radial vein at the distal upper arm and cephalic vein thrombus at the proximal upper arm.  - C/w heparin gtt

## 2022-09-10 NOTE — PROGRESS NOTE ADULT - PROBLEM SELECTOR PLAN 2
- Staph lugdunesis 8/29 bcx, coagulase-negative staph treated in a similar manner to staph aureus  - Likely source is abscess of the rt subcutaneous port  - BCx 9/7 - NGTD  - Per anesthesia, MYRIAM is high risk and needs to be done under general anesthesia with intubation. Per pulmonology, patient is high risk given her pulmonary hypertension and is not able to undergo procedure.   - Cefazolin 2g q 8 x 4 week duration  - CT A&P showing no evidence of abscess present in left leg hematoma.   - s/p I&D (9/8/22)  - f/u wound culture.

## 2022-09-10 NOTE — PROGRESS NOTE ADULT - SUBJECTIVE AND OBJECTIVE BOX
42 yr old female with PulmHTN class I/VI on Treprostinil therapy, Chronic PE, ILD, JULIA who originally presented with palpitations accompanied by orthopnea/N/V/RUQ abd pain over a 2 day period. Course c/b hypotension secondary to acute on chronic cor pulmonale with end organ dysfx (EDOUARD, transaminitis). General surgery consulted for induration around R subcutaneous injection port. Pt said the R site was painful and placed another subQ port on her left side.    MEDICATIONS  (STANDING):  ceFAZolin   IVPB 2000 milliGRAM(s) IV Intermittent every 8 hours  ceFAZolin   IVPB      chlorhexidine 4% Liquid 1 Application(s) Topical <User Schedule>  cyanocobalamin 1000 MICROGram(s) Oral daily  pantoprazole    Tablet 40 milliGRAM(s) Oral before breakfast  polyethylene glycol 3350 17 Gram(s) Oral every 12 hours  predniSONE   Tablet 10 milliGRAM(s) Oral daily  Remodulin (Treprostinil) 5mG/mL 1 Vial(s) 1 Vial(s) SubCutaneous Continuous Pump  senna 2 Tablet(s) Oral at bedtime  sildenafil (REVATIO) 20 milliGRAM(s) Oral <User Schedule>  spironolactone 25 milliGRAM(s) Oral daily    MEDICATIONS  (PRN):  acetaminophen     Tablet .. 650 milliGRAM(s) Oral every 6 hours PRN Mild Pain (1 - 3)  aluminum hydroxide/magnesium hydroxide/simethicone Suspension 30 milliLiter(s) Oral every 4 hours PRN Dyspepsia  artificial  tears Solution 1 Drop(s) Both EYES three times a day PRN Dry Eyes  HYDROmorphone  Injectable 0.5 milliGRAM(s) IV Push every 4 hours PRN Pain  ipratropium    for Nebulization 500 MICROGram(s) Nebulizer every 6 hours PRN Bronchospasm  melatonin 3 milliGRAM(s) Oral at bedtime PRN Insomnia  ondansetron Injectable 4 milliGRAM(s) IV Push every 8 hours PRN Nausea and/or Vomiting  sodium chloride 0.65% Nasal 1 Spray(s) Both Nostrils every 6 hours PRN Nasal Congestion  sodium chloride 0.9% lock flush 10 milliLiter(s) IV Push every 1 hour PRN Pre/post blood products, medications, blood draw, and to maintain line patency      Allergies  adhesives (Rash)  penicillin (Rash)  vancomycin (Rash)    Intolerances  albuterol (Other; Rash)      SOCIAL HISTORY:    FAMILY HISTORY:  Family history of diabetes mellitus  in brother    Family history of diabetes mellitus in mother    FH: anemia        PHYSICAL EXAM  General: NAD, resting comfortably  HEENT: NC/AT, EOMI  Abdominal: Dressing mildly saturated with packing in place. Induration resolved  Extremities: WWP, normal strength, no clubbing/cyanosis/edema  Neuro: A/O x 3    Vital Signs Last 24 Hrs  T(C): 36.7 (08 Sep 2022 16:03), Max: 36.8 (08 Sep 2022 04:33)  T(F): 98 (08 Sep 2022 16:03), Max: 98.3 (08 Sep 2022 04:33)  HR: 90 (08 Sep 2022 16:03) (85 - 98)  BP: 99/58 (08 Sep 2022 14:30) (85/65 - 108/68)  BP(mean): --  RR: 18 (08 Sep 2022 16:03) (18 - 20)  SpO2: 99% (08 Sep 2022 16:03) (97% - 100%)    Parameters below as of 08 Sep 2022 16:03  Patient On (Oxygen Delivery Method): room air    I&O's Summary    07 Sep 2022 07:01  -  08 Sep 2022 07:00  --------------------------------------------------------  IN: 885 mL / OUT: 1850 mL / NET: -965 mL      LABS:                        11.6   13.73 )-----------( 292      ( 08 Sep 2022 04:26 )             37.1     09-08    133<L>  |  97  |  11  ----------------------------<  89  4.1   |  23  |  0.88    Ca    8.9      08 Sep 2022 04:26  Phos  4.2     09-08  Mg     1.8     09-08    TPro  6.9  /  Alb  3.6  /  TBili  0.8  /  DBili  x   /  AST  23  /  ALT  79<H>  /  AlkPhos  60  09-08    PT/INR - ( 07 Sep 2022 00:21 )   PT: 12.4 sec;   INR: 1.08 ratio         PTT - ( 08 Sep 2022 04:26 )  PTT:36.1 sec    CAPILLARY BLOOD GLUCOSE    LIVER FUNCTIONS - ( 08 Sep 2022 04:26 )  Alb: 3.6 g/dL / Pro: 6.9 g/dL / ALK PHOS: 60 U/L / ALT: 79 U/L / AST: 23 U/L / GGT: x

## 2022-09-10 NOTE — PROGRESS NOTE ADULT - ATTENDING COMMENTS
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 EDOUARD likely secondary to venous congestion- resolved.     Now transferred out of the ICU.     Staph lugdunensis bacteremia- continue Ancef. Subcutaneous abscess secondary to device- I&D done. Plan for four week course of abx.     High risk for MYRIAM- deferred.     Worsening groin hematoma- heparin held initially, restarted 9/9 as found to have R cephalic v thrombosis. Monitor.     PICC to be placed.

## 2022-09-10 NOTE — PROGRESS NOTE ADULT - ASSESSMENT
42F PMHx of pulmonary HTN (group I and group IV; on Treprostinil and Xarelto), home o2 (2L), ILD, JULIA, obesity, and PPM presents on 8/28 for pain, nausea and vomiting, found to be in profound RV failure s/p dobutamine, Marcela, and aggressive diuresis. Patient also noted to have RA clot in transit on TTE s/p mechanical thrombectomy which was complicated by left groin pseudoaneurysm s/p thrombin injection. Course complicated by EDOUARD likely cardiorenal which has improved.    TTE done which showed hyperdynamic and compressed LV, severe RV dysfunction, mod TR, dilated IVC. Repeat TTE with improved RV function however RA thrombus noted.   CTA:  marked enlargement of PA. No PE. B/L GGOs.     TTE deferred as she is high risk for anesthesia.  She is being treated for coag negative staph bacteremia.  Now with thrombus in the radial vein and cephalic vein.  IV access has been difficult and heparin held until if is established.  Anticoagulation will be resumed ASAP.    She is HD stable and stable from oxygenation standpoint.   Plan:  Continue PH regimen  Continue IV heparin monitoring PTT and Hb  OOB to chair and ambulation daily  Oxygen to maintain sats >90%  Continue Cefazolin for total 4 weeks

## 2022-09-10 NOTE — PROGRESS NOTE ADULT - SUBJECTIVE AND OBJECTIVE BOX
[facility] DIVISION OF PULMONARY, CRITICAL CARE and SLEEP MEDICINE  PULMONARY PROGRESS NOTE  OFFICE NUMBER: [phone]    PATIENT INFORMATION:  NAME: ROXI MARIA:  MRN: [id number]    CHIEF COMPLAINT: Patient is a 42y old  Female who presents with a chief complaint of Palpitations (10 Sep 2022 08:46)      [x] INITIAL CONSULT, H&P, FAMILY HISTORY and PAST MEDICAL AND SURGICAL HISTORY REVIEWED    OVERNIGHT EVENTS or CHANGES TO HPI:     ========================REVIEW OF SYSTEMS========================  CONSTITUTIONAL:  CARDIOVASCULAR:  PULMONARY:  [] REMAINING REVIEW OF SYSTEMS NEGATIVE  [] UNABLE TO OBTAIN REVIEW OF SYSTEMS DUE TO _______________    ========================MEDICATIONS=============================  MEDICATIONS  (STANDING):  ceFAZolin   IVPB 2000 milliGRAM(s) IV Intermittent every 8 hours  ceFAZolin   IVPB      chlorhexidine 4% Liquid 1 Application(s) Topical <User Schedule>  cyanocobalamin 1000 MICROGram(s) Oral daily  furosemide    Tablet 20 milliGRAM(s) Oral <User Schedule>  heparin  Infusion. 1400 Unit(s)/Hr (14 mL/Hr) IV Continuous <Continuous>  lidocaine 1% Injectable 50 milliLiter(s) Local Injection once  multivitamin 1 Tablet(s) Oral daily  pantoprazole    Tablet 40 milliGRAM(s) Oral before breakfast  polyethylene glycol 3350 17 Gram(s) Oral every 12 hours  predniSONE   Tablet 10 milliGRAM(s) Oral daily  Remodulin (Treprostinil) 5mG/mL 1 Vial(s) 1 Vial(s) SubCutaneous Continuous Pump  senna 2 Tablet(s) Oral at bedtime  sildenafil (REVATIO) 20 milliGRAM(s) Oral <User Schedule>  spironolactone 25 milliGRAM(s) Oral daily      MEDICATIONS  (PRN):  acetaminophen     Tablet .. 650 milliGRAM(s) Oral every 6 hours PRN Mild Pain (1 - 3)  aluminum hydroxide/magnesium hydroxide/simethicone Suspension 30 milliLiter(s) Oral every 4 hours PRN Dyspepsia  artificial  tears Solution 1 Drop(s) Both EYES three times a day PRN Dry Eyes  heparin   Injectable 7500 Unit(s) IV Push every 6 hours PRN For aPTT less than 40  heparin   Injectable 3500 Unit(s) IV Push every 6 hours PRN For aPTT between 40 - 57  HYDROmorphone  Injectable 0.5 milliGRAM(s) IV Push every 4 hours PRN Pain  ipratropium    for Nebulization 500 MICROGram(s) Nebulizer every 6 hours PRN Bronchospasm  melatonin 3 milliGRAM(s) Oral at bedtime PRN Insomnia  ondansetron Injectable 4 milliGRAM(s) IV Push every 8 hours PRN Nausea and/or Vomiting  sodium chloride 0.65% Nasal 1 Spray(s) Both Nostrils every 6 hours PRN Nasal Congestion  sodium chloride 0.9% lock flush 10 milliLiter(s) IV Push every 1 hour PRN Pre/post blood products, medications, blood draw, and to maintain line patency      ========================PHYSICAL EXAM============================    VITALS: ICU Vital Signs Last 24 Hrs  T(C): 37.6 (10 Sep 2022 09:16), Max: 37.6 (10 Sep 2022 09:16)  T(F): 99.7 (10 Sep 2022 09:16), Max: 99.7 (10 Sep 2022 09:16)  HR: 111 (10 Sep 2022 09:16) (89 - 111)  BP: 118/74 (10 Sep 2022 09:16) (94/64 - 131/65)  BP(mean): --  ABP: --  ABP(mean): --  RR: 18 (10 Sep 2022 09:16) (18 - 18)  SpO2: 96% (10 Sep 2022 09:16) (96% - 100%)    O2 Parameters below as of 10 Sep 2022 09:16  Patient On (Oxygen Delivery Method): nasal cannula  O2 Flow (L/min): 2          INTAKE and OUTPUT: I&O's Summary    09 Sep 2022 07:01  -  10 Sep 2022 07:00  --------------------------------------------------------  IN: 1412 mL / OUT: 2150 mL / NET: -738 mL        VENTILATOR SETTINGS:     GENERAL:   EYES:  EAR/NOSE/MOUTH/THROAT:  NECK:  LYMPH NODES:  CARDIOVASCULAR:  RESPIRATORY:  ABDOMEN:  EXTREMITIES  SKIN:  MUSCULOSKELETAL:   NEUROLOGIC:  PSYCHIATRIC    ========================LABORATORY RESULTS AND IMAGING=============                        11.9   13.92 )-----------( 326      ( 10 Sep 2022 05:34 )             38.4                                                    09-09    133<L>  |  98  |  12  ----------------------------<  93  4.5   |  25  |  1.11    Ca    9.0      09 Sep 2022 12:48  Phos  3.3     09-09  Mg     1.8     09-09    TPro  7.2  /  Alb  3.9  /  TBili  0.7  /  DBili  x   /  AST  24  /  ALT  60<H>  /  AlkPhos  63  09-09          Creatinine Trend: 1.11<--, 0.88<--, 0.76<--, 0.82<--, 0.89<--, 0.75<--    CT CHEST:     [] RADIOLOGY REVIEWED AND INTERPRETED BY ME      THANK YOU FOR ALLOWING US TO PARTICIPATE IN THE CARE OF THIS PATIENT     [facility] DIVISION OF PULMONARY, CRITICAL CARE and SLEEP MEDICINE  PULMONARY PROGRESS NOTE  OFFICE NUMBER: [phone]    PATIENT INFORMATION:  NAME: ROXI MARIA:  MRN: [id number]    CHIEF COMPLAINT: Patient is a 42y old  Female who presents with a chief complaint of Palpitations (10 Sep 2022 08:46)      [x] INITIAL CONSULT, H&P, FAMILY HISTORY and PAST MEDICAL AND SURGICAL HISTORY REVIEWED    OVERNIGHT EVENTS or CHANGES TO HPI: IV placed and she is back on IV heparin.  No new events overnight.      ========================REVIEW OF SYSTEMS========================  CONSTITUTIONAL: feels better, states she walked today with some leg discomfort, but does not note worsening pain.  Abdoomen wound site is imroved.    CARDIOVASCULAR:  PULMONARY: denes breathing issues  [x] REMAINING REVIEW OF SYSTEMS NEGATIVE  [] UNABLE TO OBTAIN REVIEW OF SYSTEMS DUE TO _______________    ========================MEDICATIONS=============================  MEDICATIONS  (STANDING):  ceFAZolin   IVPB 2000 milliGRAM(s) IV Intermittent every 8 hours  ceFAZolin   IVPB      chlorhexidine 4% Liquid 1 Application(s) Topical <User Schedule>  cyanocobalamin 1000 MICROGram(s) Oral daily  furosemide    Tablet 20 milliGRAM(s) Oral <User Schedule>  heparin  Infusion. 1400 Unit(s)/Hr (14 mL/Hr) IV Continuous <Continuous>  lidocaine 1% Injectable 50 milliLiter(s) Local Injection once  multivitamin 1 Tablet(s) Oral daily  pantoprazole    Tablet 40 milliGRAM(s) Oral before breakfast  polyethylene glycol 3350 17 Gram(s) Oral every 12 hours  predniSONE   Tablet 10 milliGRAM(s) Oral daily  Remodulin (Treprostinil) 5mG/mL 1 Vial(s) 1 Vial(s) SubCutaneous Continuous Pump  senna 2 Tablet(s) Oral at bedtime  sildenafil (REVATIO) 20 milliGRAM(s) Oral <User Schedule>  spironolactone 25 milliGRAM(s) Oral daily      MEDICATIONS  (PRN):  acetaminophen     Tablet .. 650 milliGRAM(s) Oral every 6 hours PRN Mild Pain (1 - 3)  aluminum hydroxide/magnesium hydroxide/simethicone Suspension 30 milliLiter(s) Oral every 4 hours PRN Dyspepsia  artificial  tears Solution 1 Drop(s) Both EYES three times a day PRN Dry Eyes  heparin   Injectable 7500 Unit(s) IV Push every 6 hours PRN For aPTT less than 40  heparin   Injectable 3500 Unit(s) IV Push every 6 hours PRN For aPTT between 40 - 57  HYDROmorphone  Injectable 0.5 milliGRAM(s) IV Push every 4 hours PRN Pain  ipratropium    for Nebulization 500 MICROGram(s) Nebulizer every 6 hours PRN Bronchospasm  melatonin 3 milliGRAM(s) Oral at bedtime PRN Insomnia  ondansetron Injectable 4 milliGRAM(s) IV Push every 8 hours PRN Nausea and/or Vomiting  sodium chloride 0.65% Nasal 1 Spray(s) Both Nostrils every 6 hours PRN Nasal Congestion  sodium chloride 0.9% lock flush 10 milliLiter(s) IV Push every 1 hour PRN Pre/post blood products, medications, blood draw, and to maintain line patency      ========================PHYSICAL EXAM============================    VITALS: ICU Vital Signs Last 24 Hrs  T(C): 37.6 (10 Sep 2022 09:16), Max: 37.6 (10 Sep 2022 09:16)  T(F): 99.7 (10 Sep 2022 09:16), Max: 99.7 (10 Sep 2022 09:16)  HR: 111 (10 Sep 2022 09:16) (89 - 111)  BP: 118/74 (10 Sep 2022 09:16) (94/64 - 131/65)  BP(mean): --  ABP: --  ABP(mean): --  RR: 18 (10 Sep 2022 09:16) (18 - 18)  SpO2: 96% (10 Sep 2022 09:16) (96% - 100%)    O2 Parameters below as of 10 Sep 2022 09:16  Patient On (Oxygen Delivery Method): nasal cannula  O2 Flow (L/min): 2          INTAKE and OUTPUT: I&O's Summary    09 Sep 2022 07:01  -  10 Sep 2022 07:00  --------------------------------------------------------  IN: 1412 mL / OUT: 2150 mL / NET: -738 mL        General:  appears well no acute distress  EAR/NOSE/MOUTH/THROAT:  NECK:  LYMPH NODES:  CARDIOVASCULAR: RRR  RESPIRATORY: clear without rales/wheezes  ABDOMEN: soft nontender  EXTREMITIES without edema, well perfused  SKIN:  MUSCULOSKELETAL:   NEUROLOGIC:awake alert  PSYCHIATRIC good spiritis    ========================LABORATORY RESULTS AND IMAGING=============                        11.9   13.92 )-----------( 326      ( 10 Sep 2022 05:34 )             38.4                                                    09-09    133<L>  |  98  |  12  ----------------------------<  93  4.5   |  25  |  1.11    Ca    9.0      09 Sep 2022 12:48  Phos  3.3     09-09  Mg     1.8     09-09    TPro  7.2  /  Alb  3.9  /  TBili  0.7  /  DBili  x   /  AST  24  /  ALT  60<H>  /  AlkPhos  63  09-09          Creatinine Trend: 1.11<--, 0.88<--, 0.76<--, 0.82<--, 0.89<--, 0.75<--    CT CHEST:     [] RADIOLOGY REVIEWED AND INTERPRETED BY ME no new imaging of chest      THANK YOU FOR ALLOWING US TO PARTICIPATE IN THE CARE OF THIS PATIENT

## 2022-09-10 NOTE — PROGRESS NOTE ADULT - ASSESSMENT
42 yr old female with stated hx significant for PulmHTN class I/VI on Treprostinil therapy, Chronic PE who originally presented with palpitations accompanied by orthopnea/N/V/RUQ abd pain over a 2 day period. Course c/b hypotension secondary to acute on chronic cor pulmonale with end organ dysfx (EDOUARD, transaminitis), and right atrial clot requiring catheter guided thrombectomy, as well as thrombin injection of right femoral pseudoaneurysm.     Patient found to have up-trending white count with high of 16 this AM. One set of blood cultures from 8/29 positive for staph lugdunensis. TTE from 8/29 and 8/31 identified no vegetations. Repeat cultures from 8/31 NGTD. Central venous line and A-line placed on 9/2, after positive culture. Patient with catheter directed thrombectomy on 8/31.   Patient with hx of PPM placed in 2016. Continuous Treprostinil pumped removed from site in RLQ following increased tenderness and swelling approximately 3 days ago, with patient re-implanting infusion pump in LLQ on her own.     Patient with positive culture of Staph Lugdunensis, which is typically pathogenic and not a contaminant, i/s/o worsening sub-q and skin infection with likely underlying abscess from previous Remodulin placement site. Patient empirically treated with course of aztreonam 2000mg BID from 8/29-9/1 out of concern for sepsis contributing to hypotension i/s/o elevated white count, but this would have little coverage for Staph Lugdunensis.       overall bacteremia, cellulitis and abscess, leucocytosis, tachycardia, sepsis, allergy to multiple abx.   pseudoaneurysm, s/p coiling.       PLAN:   -s/p drainage of abscess, follow up cx.   - repeat blood cultures NTD   - c/w cefazolin 2 gm iv q8h, tolerating it well.   - would hold off on MYRIAM given concern for need for intubation, after discussion with pulm would hold off.   - CT lt groin with no superinfection of hematoma  - trend cbc for leucocytosis, stable.     Mario Gaming  Attending Physician   Division of Infectious Disease  Office #[phone]  Available on Microsoft Teams also  After 5pm/weekend or no response, call #[phone]

## 2022-09-10 NOTE — PROGRESS NOTE ADULT - NSPROGADDITIONALINFOA_GEN_ALL_CORE
Please call the ID service [phone] with questions or concerns over the weekend.
Case and plan discussed with ACP: SHY Grider, CM, clinical pharmacist

## 2022-09-10 NOTE — PROGRESS NOTE ADULT - SUBJECTIVE AND OBJECTIVE BOX
ROXI MARIA 42y [id number]    Patient is a 42y old  Female who presents with a chief complaint of Palpitations (10 Sep 2022 07:32)      Follow Up/CC:  ID following for mssa    Interval History/ROS: no fever, feels well    Allergies    adhesives (Rash)  penicillin (Rash)  vancomycin (Rash)    Intolerances    albuterol (Other; Rash)      ANTIMICROBIALS:  ceFAZolin   IVPB 2000 every 8 hours  ceFAZolin   IVPB        MEDICATIONS  (STANDING):  ceFAZolin   IVPB 2000 milliGRAM(s) IV Intermittent every 8 hours  ceFAZolin   IVPB      chlorhexidine 4% Liquid 1 Application(s) Topical <User Schedule>  cyanocobalamin 1000 MICROGram(s) Oral daily  furosemide    Tablet 20 milliGRAM(s) Oral <User Schedule>  heparin  Infusion. 1400 Unit(s)/Hr (14 mL/Hr) IV Continuous <Continuous>  lidocaine 1% Injectable 50 milliLiter(s) Local Injection once  multivitamin 1 Tablet(s) Oral daily  pantoprazole    Tablet 40 milliGRAM(s) Oral before breakfast  polyethylene glycol 3350 17 Gram(s) Oral every 12 hours  predniSONE   Tablet 10 milliGRAM(s) Oral daily  Remodulin (Treprostinil) 5mG/mL 1 Vial(s) 1 Vial(s) SubCutaneous Continuous Pump  senna 2 Tablet(s) Oral at bedtime  sildenafil (REVATIO) 20 milliGRAM(s) Oral <User Schedule>  spironolactone 25 milliGRAM(s) Oral daily    MEDICATIONS  (PRN):  acetaminophen     Tablet .. 650 milliGRAM(s) Oral every 6 hours PRN Mild Pain (1 - 3)  aluminum hydroxide/magnesium hydroxide/simethicone Suspension 30 milliLiter(s) Oral every 4 hours PRN Dyspepsia  artificial  tears Solution 1 Drop(s) Both EYES three times a day PRN Dry Eyes  heparin   Injectable 7500 Unit(s) IV Push every 6 hours PRN For aPTT less than 40  heparin   Injectable 3500 Unit(s) IV Push every 6 hours PRN For aPTT between 40 - 57  HYDROmorphone  Injectable 0.5 milliGRAM(s) IV Push every 4 hours PRN Pain  ipratropium    for Nebulization 500 MICROGram(s) Nebulizer every 6 hours PRN Bronchospasm  melatonin 3 milliGRAM(s) Oral at bedtime PRN Insomnia  ondansetron Injectable 4 milliGRAM(s) IV Push every 8 hours PRN Nausea and/or Vomiting  sodium chloride 0.65% Nasal 1 Spray(s) Both Nostrils every 6 hours PRN Nasal Congestion  sodium chloride 0.9% lock flush 10 milliLiter(s) IV Push every 1 hour PRN Pre/post blood products, medications, blood draw, and to maintain line patency        Vital Signs Last 24 Hrs  T(C): 36.8 (10 Sep 2022 04:23), Max: 36.9 (09 Sep 2022 13:25)  T(F): 98.3 (10 Sep 2022 04:23), Max: 98.4 (09 Sep 2022 13:25)  HR: 91 (10 Sep 2022 04:23) (89 - 103)  BP: 107/71 (10 Sep 2022 04:23) (94/64 - 131/65)  BP(mean): --  RR: 18 (10 Sep 2022 04:23) (18 - 18)  SpO2: 100% (10 Sep 2022 04:23) (98% - 100%)    Parameters below as of 10 Sep 2022 04:23  Patient On (Oxygen Delivery Method): nasal cannula  O2 Flow (L/min): 2      CBC Full  -  ( 10 Sep 2022 05:34 )  WBC Count : 13.92 K/uL  RBC Count : 5.21 M/uL  Hemoglobin : 11.9 g/dL  Hematocrit : 38.4 %  Platelet Count - Automated : 326 K/uL  Mean Cell Volume : 73.7 fl  Mean Cell Hemoglobin : 22.8 pg  Mean Cell Hemoglobin Concentration : 31.0 gm/dL  Auto Neutrophil # : x  Auto Lymphocyte # : x  Auto Monocyte # : x  Auto Eosinophil # : x  Auto Basophil # : x  Auto Neutrophil % : x  Auto Lymphocyte % : x  Auto Monocyte % : x  Auto Eosinophil % : x  Auto Basophil % : x    09-09    133<L>  |  98  |  12  ----------------------------<  93  4.5   |  25  |  1.11    Ca    9.0      09 Sep 2022 12:48  Phos  3.3     09-09  Mg     1.8     09-09    TPro  7.2  /  Alb  3.9  /  TBili  0.7  /  DBili  x   /  AST  24  /  ALT  60<H>  /  AlkPhos  63  09-09    LIVER FUNCTIONS - ( 09 Sep 2022 12:48 )  Alb: 3.9 g/dL / Pro: 7.2 g/dL / ALK PHOS: 63 U/L / ALT: 60 U/L / AST: 24 U/L / GGT: x               MICROBIOLOGY:  .Abscess Skin  09-08-22   Few Staphylococcus aureus  --  --      .Blood Blood-Venous  09-07-22   No growth to date.  --  --      .Blood Blood-Venous  09-07-22   No growth to date.  --  --      .Sputum Sputum  08-31-22   Normal Respiratory Laura present  --    Moderate polymorphonuclear leukocytes per low power field  Rare Squamous epithelial cells per low power field  Moderate Gram positive cocci in pairs seen per oil power field  Few Gram Negative Rods seen per oil power field      .Blood Blood-Peripheral  08-31-22   No Growth Final  --  --      .Blood Blood-Peripheral  08-29-22   No Growth Final  --  --      .Blood Blood-Peripheral  08-29-22   Growth in aerobic and anaerobic bottles: Staphylococcus lugdunensis  ***Blood Panel PCR results on this specimen are available  approximately 3 hours after the Gram stain result.***  Gram stain, PCR, and/or culture results may not always  correspond due to difference in methodologies.  ************************************************************  This PCR assay was performed by multiplex PCR. This  Assay tests for 66 bacterial and resistance gene targets.  Please refer to the [facility] test directory  at [email or web address] for details.  --  Blood Culture PCR  Staphylococcus lugdunensis              v            RADIOLOGY

## 2022-09-10 NOTE — PROGRESS NOTE ADULT - PROBLEM SELECTOR PLAN 4
- hematoma of left groin, no evidence of pseudoaneurysm.  - Left groin hematoma expanding in size, and new superficial hematoma measuring 3.7x 5.7 x 1.9 cm.   - Per vascular no indication for intervention at this time as no signs of infection or nerve palsies present.   - f/u vascular surgery recommendations  - No evidence of abscess present of the left lower extremity hematoma  - Will monitor H/H

## 2022-09-10 NOTE — PROGRESS NOTE ADULT - ASSESSMENT
42 yr old female with PulmHTN class I/VI on Treprostinil therapy, Chronic PE, ILD, JULIA who originally presented with palpitations accompanied by orthopnea/N/V/RUQ abd pain over a 2 day period. Course c/b hypotension secondary to acute on chronic cor pulmonale with end organ dysfx (EDOUARD, transaminitis).    General surgery consulted for induration around R subcutaneous injection port. Pt said the R site was painful and placed another subQ port on her left side.    PLAN  - Bedside I&D (9/8)  - Patients induration resolved  - Packing exchanged today, dressing changed  - Plan to likely remove packing tomorrow and continue with dressings  - Care per primary team    Acute Care Surgery  [id number]

## 2022-09-10 NOTE — PROGRESS NOTE ADULT - SUBJECTIVE AND OBJECTIVE BOX
Richard Rizo, PGY2    DATE OF SERVICE: 09-10-22 @ 07:33    Patient is a 42y old  Female who presents with a chief complaint of Palpitations (09 Sep 2022 17:52)      SUBJECTIVE / OVERNIGHT EVENTS: No acute events overnight. Patient seen and examined this AM.      MEDICATIONS  (STANDING):  ceFAZolin   IVPB 2000 milliGRAM(s) IV Intermittent every 8 hours  ceFAZolin   IVPB      chlorhexidine 4% Liquid 1 Application(s) Topical <User Schedule>  cyanocobalamin 1000 MICROGram(s) Oral daily  furosemide    Tablet 20 milliGRAM(s) Oral <User Schedule>  heparin  Infusion. 1400 Unit(s)/Hr (14 mL/Hr) IV Continuous <Continuous>  lidocaine 1% Injectable 50 milliLiter(s) Local Injection once  multivitamin 1 Tablet(s) Oral daily  pantoprazole    Tablet 40 milliGRAM(s) Oral before breakfast  polyethylene glycol 3350 17 Gram(s) Oral every 12 hours  predniSONE   Tablet 10 milliGRAM(s) Oral daily  Remodulin (Treprostinil) 5mG/mL 1 Vial(s) 1 Vial(s) SubCutaneous Continuous Pump  senna 2 Tablet(s) Oral at bedtime  sildenafil (REVATIO) 20 milliGRAM(s) Oral <User Schedule>  spironolactone 25 milliGRAM(s) Oral daily    MEDICATIONS  (PRN):  acetaminophen     Tablet .. 650 milliGRAM(s) Oral every 6 hours PRN Mild Pain (1 - 3)  aluminum hydroxide/magnesium hydroxide/simethicone Suspension 30 milliLiter(s) Oral every 4 hours PRN Dyspepsia  artificial  tears Solution 1 Drop(s) Both EYES three times a day PRN Dry Eyes  heparin   Injectable 7500 Unit(s) IV Push every 6 hours PRN For aPTT less than 40  heparin   Injectable 3500 Unit(s) IV Push every 6 hours PRN For aPTT between 40 - 57  HYDROmorphone  Injectable 0.5 milliGRAM(s) IV Push every 4 hours PRN Pain  ipratropium    for Nebulization 500 MICROGram(s) Nebulizer every 6 hours PRN Bronchospasm  melatonin 3 milliGRAM(s) Oral at bedtime PRN Insomnia  ondansetron Injectable 4 milliGRAM(s) IV Push every 8 hours PRN Nausea and/or Vomiting  sodium chloride 0.65% Nasal 1 Spray(s) Both Nostrils every 6 hours PRN Nasal Congestion  sodium chloride 0.9% lock flush 10 milliLiter(s) IV Push every 1 hour PRN Pre/post blood products, medications, blood draw, and to maintain line patency      Vital Signs Last 24 Hrs  T(C): 36.8 (10 Sep 2022 04:23), Max: 36.9 (09 Sep 2022 13:25)  T(F): 98.3 (10 Sep 2022 04:23), Max: 98.4 (09 Sep 2022 13:25)  HR: 91 (10 Sep 2022 04:23) (89 - 103)  BP: 107/71 (10 Sep 2022 04:23) (94/64 - 131/65)  BP(mean): --  RR: 18 (10 Sep 2022 04:23) (18 - 18)  SpO2: 100% (10 Sep 2022 04:23) (98% - 100%)    Parameters below as of 10 Sep 2022 04:23  Patient On (Oxygen Delivery Method): nasal cannula  O2 Flow (L/min): 2    CAPILLARY BLOOD GLUCOSE        I&O's Summary    09 Sep 2022 07:01  -  10 Sep 2022 07:00  --------------------------------------------------------  IN: 1412 mL / OUT: 2150 mL / NET: -738 mL        PHYSICAL EXAM:  GENERAL: No apparent distress.   HEAD:  Atraumatic, Normocephalic  EYES: EOMI, PERRLA, conjunctiva and sclera clear  NECK: Supple, no lymphadenopathy, no JVD  CHEST/LUNG: Clear to auscultation bilateral and symmetric; No wheezes, rales, or rhonchi  HEART: S1 and S2 normal. Regular rate and rhythm; No murmurs, rubs, or gallops  ABDOMEN: Soft, non-tender, non-distended; normal bowel sounds. Clean, dry dressing on rt anterior abdominal wall.  EXTREMITIES: LLE hematoma near groin, TTP. 2+ peripheral pulses b/l, No clubbing, cyanosis, or edema  NEUROLOGY: A&O x 3, no focal deficits  SKIN: No rashes or lesions    LABS:                        11.9   13.92 )-----------( 326      ( 10 Sep 2022 05:34 )             38.4     09-09    133<L>  |  98  |  12  ----------------------------<  93  4.5   |  25  |  1.11    Ca    9.0      09 Sep 2022 12:48  Phos  3.3     09-09  Mg     1.8     09-09    TPro  7.2  /  Alb  3.9  /  TBili  0.7  /  DBili  x   /  AST  24  /  ALT  60<H>  /  AlkPhos  63  09-09    PTT - ( 10 Sep 2022 00:23 )  PTT:79.6 sec          RADIOLOGY & ADDITIONAL TESTS:    Imaging Personally Reviewed:    Consultant(s) Notes Reviewed:      Care Discussed with Consultants/Other Providers:   Richard Rizo, PGY2    DATE OF SERVICE: 09-10-22 @ 07:33    Patient is a 42y old  Female who presents with a chief complaint of Palpitations (09 Sep 2022 17:52)      SUBJECTIVE / OVERNIGHT EVENTS: No acute events overnight. Patient seen and examined this AM. Denies any chest pain or SOB.      MEDICATIONS  (STANDING):  ceFAZolin   IVPB 2000 milliGRAM(s) IV Intermittent every 8 hours  ceFAZolin   IVPB      chlorhexidine 4% Liquid 1 Application(s) Topical <User Schedule>  cyanocobalamin 1000 MICROGram(s) Oral daily  furosemide    Tablet 20 milliGRAM(s) Oral <User Schedule>  heparin  Infusion. 1400 Unit(s)/Hr (14 mL/Hr) IV Continuous <Continuous>  lidocaine 1% Injectable 50 milliLiter(s) Local Injection once  multivitamin 1 Tablet(s) Oral daily  pantoprazole    Tablet 40 milliGRAM(s) Oral before breakfast  polyethylene glycol 3350 17 Gram(s) Oral every 12 hours  predniSONE   Tablet 10 milliGRAM(s) Oral daily  Remodulin (Treprostinil) 5mG/mL 1 Vial(s) 1 Vial(s) SubCutaneous Continuous Pump  senna 2 Tablet(s) Oral at bedtime  sildenafil (REVATIO) 20 milliGRAM(s) Oral <User Schedule>  spironolactone 25 milliGRAM(s) Oral daily    MEDICATIONS  (PRN):  acetaminophen     Tablet .. 650 milliGRAM(s) Oral every 6 hours PRN Mild Pain (1 - 3)  aluminum hydroxide/magnesium hydroxide/simethicone Suspension 30 milliLiter(s) Oral every 4 hours PRN Dyspepsia  artificial  tears Solution 1 Drop(s) Both EYES three times a day PRN Dry Eyes  heparin   Injectable 7500 Unit(s) IV Push every 6 hours PRN For aPTT less than 40  heparin   Injectable 3500 Unit(s) IV Push every 6 hours PRN For aPTT between 40 - 57  HYDROmorphone  Injectable 0.5 milliGRAM(s) IV Push every 4 hours PRN Pain  ipratropium    for Nebulization 500 MICROGram(s) Nebulizer every 6 hours PRN Bronchospasm  melatonin 3 milliGRAM(s) Oral at bedtime PRN Insomnia  ondansetron Injectable 4 milliGRAM(s) IV Push every 8 hours PRN Nausea and/or Vomiting  sodium chloride 0.65% Nasal 1 Spray(s) Both Nostrils every 6 hours PRN Nasal Congestion  sodium chloride 0.9% lock flush 10 milliLiter(s) IV Push every 1 hour PRN Pre/post blood products, medications, blood draw, and to maintain line patency      Vital Signs Last 24 Hrs  T(C): 36.8 (10 Sep 2022 04:23), Max: 36.9 (09 Sep 2022 13:25)  T(F): 98.3 (10 Sep 2022 04:23), Max: 98.4 (09 Sep 2022 13:25)  HR: 91 (10 Sep 2022 04:23) (89 - 103)  BP: 107/71 (10 Sep 2022 04:23) (94/64 - 131/65)  BP(mean): --  RR: 18 (10 Sep 2022 04:23) (18 - 18)  SpO2: 100% (10 Sep 2022 04:23) (98% - 100%)    Parameters below as of 10 Sep 2022 04:23  Patient On (Oxygen Delivery Method): nasal cannula  O2 Flow (L/min): 2    CAPILLARY BLOOD GLUCOSE        I&O's Summary    09 Sep 2022 07:01  -  10 Sep 2022 07:00  --------------------------------------------------------  IN: 1412 mL / OUT: 2150 mL / NET: -738 mL      PHYSICAL EXAM:  GENERAL: No apparent distress.   HEAD:  Atraumatic, Normocephalic  EYES: EOMI, PERRLA, conjunctiva and sclera clear  NECK: Supple, no lymphadenopathy, no JVD  CHEST/LUNG: Clear to auscultation bilateral and symmetric; No wheezes, rales, or rhonchi  HEART: S1 and S2 normal. Regular rate and rhythm; No murmurs, rubs, or gallops  ABDOMEN: Soft, non-tender, non-distended; normal bowel sounds. Clean, dry dressing on rt anterior abdominal wall.  EXTREMITIES: LLE hematoma near groin, TTP. 2+ peripheral pulses b/l, No clubbing, cyanosis, or edema  NEUROLOGY: A&O x 3, no focal deficits  SKIN: No rashes or lesions    LABS:                        11.9   13.92 )-----------( 326      ( 10 Sep 2022 05:34 )             38.4     09-09    133<L>  |  98  |  12  ----------------------------<  93  4.5   |  25  |  1.11    Ca    9.0      09 Sep 2022 12:48  Phos  3.3     09-09  Mg     1.8     09-09    TPro  7.2  /  Alb  3.9  /  TBili  0.7  /  DBili  x   /  AST  24  /  ALT  60<H>  /  AlkPhos  63  09-09    PTT - ( 10 Sep 2022 00:23 )  PTT:79.6 sec          RADIOLOGY & ADDITIONAL TESTS:    Imaging Personally Reviewed:    Consultant(s) Notes Reviewed:      Care Discussed with Consultants/Other Providers:

## 2022-09-11 LAB
-  AMPICILLIN/SULBACTAM: SIGNIFICANT CHANGE UP
-  AMPICILLIN/SULBACTAM: SIGNIFICANT CHANGE UP
-  CEFAZOLIN: SIGNIFICANT CHANGE UP
-  CEFAZOLIN: SIGNIFICANT CHANGE UP
-  CLINDAMYCIN: SIGNIFICANT CHANGE UP
-  CLINDAMYCIN: SIGNIFICANT CHANGE UP
-  DAPTOMYCIN: SIGNIFICANT CHANGE UP
-  DAPTOMYCIN: SIGNIFICANT CHANGE UP
-  ERYTHROMYCIN: SIGNIFICANT CHANGE UP
-  ERYTHROMYCIN: SIGNIFICANT CHANGE UP
-  GENTAMICIN: SIGNIFICANT CHANGE UP
-  GENTAMICIN: SIGNIFICANT CHANGE UP
-  LINEZOLID: SIGNIFICANT CHANGE UP
-  LINEZOLID: SIGNIFICANT CHANGE UP
-  OXACILLIN: SIGNIFICANT CHANGE UP
-  OXACILLIN: SIGNIFICANT CHANGE UP
-  PENICILLIN: SIGNIFICANT CHANGE UP
-  PENICILLIN: SIGNIFICANT CHANGE UP
-  RIFAMPIN: SIGNIFICANT CHANGE UP
-  RIFAMPIN: SIGNIFICANT CHANGE UP
-  TETRACYCLINE: SIGNIFICANT CHANGE UP
-  TETRACYCLINE: SIGNIFICANT CHANGE UP
-  TRIMETHOPRIM/SULFAMETHOXAZOLE: SIGNIFICANT CHANGE UP
-  TRIMETHOPRIM/SULFAMETHOXAZOLE: SIGNIFICANT CHANGE UP
-  VANCOMYCIN: SIGNIFICANT CHANGE UP
-  VANCOMYCIN: SIGNIFICANT CHANGE UP
ALBUMIN SERPL ELPH-MCNC: 3.6 G/DL — SIGNIFICANT CHANGE UP (ref 3.3–5)
ALP SERPL-CCNC: 57 U/L — SIGNIFICANT CHANGE UP (ref 40–120)
ALT FLD-CCNC: 29 U/L — SIGNIFICANT CHANGE UP (ref 10–45)
ANION GAP SERPL CALC-SCNC: 8 MMOL/L — SIGNIFICANT CHANGE UP (ref 5–17)
APTT BLD: 54 SEC — HIGH (ref 27.5–35.5)
AST SERPL-CCNC: 21 U/L — SIGNIFICANT CHANGE UP (ref 10–40)
BILIRUB SERPL-MCNC: 0.5 MG/DL — SIGNIFICANT CHANGE UP (ref 0.2–1.2)
BUN SERPL-MCNC: 13 MG/DL — SIGNIFICANT CHANGE UP (ref 7–23)
CALCIUM SERPL-MCNC: 8.8 MG/DL — SIGNIFICANT CHANGE UP (ref 8.4–10.5)
CHLORIDE SERPL-SCNC: 100 MMOL/L — SIGNIFICANT CHANGE UP (ref 96–108)
CO2 SERPL-SCNC: 25 MMOL/L — SIGNIFICANT CHANGE UP (ref 22–31)
CREAT SERPL-MCNC: 0.88 MG/DL — SIGNIFICANT CHANGE UP (ref 0.5–1.3)
EGFR: 84 ML/MIN/1.73M2 — SIGNIFICANT CHANGE UP
GLUCOSE SERPL-MCNC: 116 MG/DL — HIGH (ref 70–99)
HCT VFR BLD CALC: 35.2 % — SIGNIFICANT CHANGE UP (ref 34.5–45)
HGB BLD-MCNC: 10.8 G/DL — LOW (ref 11.5–15.5)
MAGNESIUM SERPL-MCNC: 1.9 MG/DL — SIGNIFICANT CHANGE UP (ref 1.6–2.6)
MCHC RBC-ENTMCNC: 22.5 PG — LOW (ref 27–34)
MCHC RBC-ENTMCNC: 30.7 GM/DL — LOW (ref 32–36)
MCV RBC AUTO: 73.3 FL — LOW (ref 80–100)
METHOD TYPE: SIGNIFICANT CHANGE UP
METHOD TYPE: SIGNIFICANT CHANGE UP
NRBC # BLD: 0 /100 WBCS — SIGNIFICANT CHANGE UP (ref 0–0)
PHOSPHATE SERPL-MCNC: 3 MG/DL — SIGNIFICANT CHANGE UP (ref 2.5–4.5)
PLATELET # BLD AUTO: 217 K/UL — SIGNIFICANT CHANGE UP (ref 150–400)
POTASSIUM SERPL-MCNC: 5.1 MMOL/L — SIGNIFICANT CHANGE UP (ref 3.5–5.3)
POTASSIUM SERPL-SCNC: 5.1 MMOL/L — SIGNIFICANT CHANGE UP (ref 3.5–5.3)
PROT SERPL-MCNC: 6.7 G/DL — SIGNIFICANT CHANGE UP (ref 6–8.3)
RBC # BLD: 4.8 M/UL — SIGNIFICANT CHANGE UP (ref 3.8–5.2)
RBC # FLD: 19.5 % — HIGH (ref 10.3–14.5)
SODIUM SERPL-SCNC: 133 MMOL/L — LOW (ref 135–145)
WBC # BLD: 13.99 K/UL — HIGH (ref 3.8–10.5)
WBC # FLD AUTO: 13.99 K/UL — HIGH (ref 3.8–10.5)

## 2022-09-11 PROCEDURE — 99233 SBSQ HOSP IP/OBS HIGH 50: CPT

## 2022-09-11 RX ADMIN — HEPARIN SODIUM 1600 UNIT(S)/HR: 5000 INJECTION INTRAVENOUS; SUBCUTANEOUS at 11:44

## 2022-09-11 RX ADMIN — Medication 100 MILLIGRAM(S): at 02:09

## 2022-09-11 RX ADMIN — Medication 100 MILLIGRAM(S): at 17:54

## 2022-09-11 RX ADMIN — PREGABALIN 1000 MICROGRAM(S): 225 CAPSULE ORAL at 11:48

## 2022-09-11 RX ADMIN — Medication 100 MILLIGRAM(S): at 09:48

## 2022-09-11 RX ADMIN — Medication 20 MILLIGRAM(S): at 22:31

## 2022-09-11 RX ADMIN — Medication 500 MICROGRAM(S): at 13:12

## 2022-09-11 RX ADMIN — SPIRONOLACTONE 25 MILLIGRAM(S): 25 TABLET, FILM COATED ORAL at 08:38

## 2022-09-11 RX ADMIN — HYDROMORPHONE HYDROCHLORIDE 0.5 MILLIGRAM(S): 2 INJECTION INTRAMUSCULAR; INTRAVENOUS; SUBCUTANEOUS at 01:20

## 2022-09-11 RX ADMIN — HEPARIN SODIUM 1600 UNIT(S)/HR: 5000 INJECTION INTRAVENOUS; SUBCUTANEOUS at 22:31

## 2022-09-11 RX ADMIN — HYDROMORPHONE HYDROCHLORIDE 0.5 MILLIGRAM(S): 2 INJECTION INTRAMUSCULAR; INTRAVENOUS; SUBCUTANEOUS at 22:31

## 2022-09-11 RX ADMIN — Medication 20 MILLIGRAM(S): at 05:19

## 2022-09-11 RX ADMIN — HYDROMORPHONE HYDROCHLORIDE 0.5 MILLIGRAM(S): 2 INJECTION INTRAMUSCULAR; INTRAVENOUS; SUBCUTANEOUS at 15:02

## 2022-09-11 RX ADMIN — Medication 100 MILLIGRAM(S): at 17:52

## 2022-09-11 RX ADMIN — CHLORHEXIDINE GLUCONATE 1 APPLICATION(S): 213 SOLUTION TOPICAL at 05:20

## 2022-09-11 RX ADMIN — Medication 20 MILLIGRAM(S): at 15:01

## 2022-09-11 RX ADMIN — HYDROMORPHONE HYDROCHLORIDE 0.5 MILLIGRAM(S): 2 INJECTION INTRAMUSCULAR; INTRAVENOUS; SUBCUTANEOUS at 09:55

## 2022-09-11 RX ADMIN — HEPARIN SODIUM 1600 UNIT(S)/HR: 5000 INJECTION INTRAVENOUS; SUBCUTANEOUS at 23:45

## 2022-09-11 RX ADMIN — PANTOPRAZOLE SODIUM 40 MILLIGRAM(S): 20 TABLET, DELAYED RELEASE ORAL at 05:21

## 2022-09-11 RX ADMIN — HYDROMORPHONE HYDROCHLORIDE 0.5 MILLIGRAM(S): 2 INJECTION INTRAMUSCULAR; INTRAVENOUS; SUBCUTANEOUS at 15:17

## 2022-09-11 RX ADMIN — Medication 20 MILLIGRAM(S): at 05:21

## 2022-09-11 RX ADMIN — Medication 10 MILLIGRAM(S): at 05:19

## 2022-09-11 RX ADMIN — HYDROMORPHONE HYDROCHLORIDE 0.5 MILLIGRAM(S): 2 INJECTION INTRAMUSCULAR; INTRAVENOUS; SUBCUTANEOUS at 09:40

## 2022-09-11 RX ADMIN — SENNA PLUS 2 TABLET(S): 8.6 TABLET ORAL at 22:31

## 2022-09-11 RX ADMIN — Medication 1 TABLET(S): at 11:49

## 2022-09-11 NOTE — PROGRESS NOTE ADULT - ASSESSMENT
42 yr old female with PulmHTN class I/VI on Treprostinil therapy, Chronic PE, ILD, JULIA who originally presented with palpitations accompanied by orthopnea/N/V/RUQ abd pain over a 2 day period. Course c/b hypotension secondary to acute on chronic cor pulmonale with end organ dysfx (EDOUARD, transaminitis).    General surgery consulted for induration around R subcutaneous injection port. Pt said the R site was painful and placed another subQ port on her left side.    PLAN  - Bedside I&D (9/8)  - Patients induration resolved  - packing removed today, dressing change per nursing, dressing gauze + tape   - Care per primary team  - please reconsult as needed

## 2022-09-11 NOTE — PROGRESS NOTE ADULT - PROBLEM SELECTOR PLAN 3
RUE US showing DVT in a radial vein at the distal upper arm and cephalic vein thrombus at the proximal upper arm.  - C/w heparin gtt

## 2022-09-11 NOTE — PROGRESS NOTE ADULT - SUBJECTIVE AND OBJECTIVE BOX
PROGRESS NOTE:   Authored by Alfonso Simons MD   Patient is a 42y old  Female who presents with a chief complaint of Palpitations (10 Sep 2022 13:16)      SUBJECTIVE / OVERNIGHT EVENTS: No acute events overnight. No chest pain,  palpitations, dyspnea, abdominal pain. nausea, vomiting, diarrhea, blurry vision, dysuria, lightheadedness, dizziness.    ADDITIONAL REVIEW OF SYSTEMS:    MEDICATIONS  (STANDING):  ceFAZolin   IVPB 2000 milliGRAM(s) IV Intermittent every 8 hours  ceFAZolin   IVPB      chlorhexidine 4% Liquid 1 Application(s) Topical <User Schedule>  cyanocobalamin 1000 MICROGram(s) Oral daily  furosemide    Tablet 20 milliGRAM(s) Oral <User Schedule>  heparin  Infusion. 1400 Unit(s)/Hr (14 mL/Hr) IV Continuous <Continuous>  lidocaine 1% Injectable 50 milliLiter(s) Local Injection once  multivitamin 1 Tablet(s) Oral daily  pantoprazole    Tablet 40 milliGRAM(s) Oral before breakfast  polyethylene glycol 3350 17 Gram(s) Oral every 12 hours  predniSONE   Tablet 10 milliGRAM(s) Oral daily  Remodulin (Treprostinil) 5mG/mL 1 Vial(s) 1 Vial(s) SubCutaneous Continuous Pump  senna 2 Tablet(s) Oral at bedtime  sildenafil (REVATIO) 20 milliGRAM(s) Oral <User Schedule>  spironolactone 25 milliGRAM(s) Oral daily    MEDICATIONS  (PRN):  acetaminophen     Tablet .. 650 milliGRAM(s) Oral every 6 hours PRN Mild Pain (1 - 3)  aluminum hydroxide/magnesium hydroxide/simethicone Suspension 30 milliLiter(s) Oral every 4 hours PRN Dyspepsia  artificial  tears Solution 1 Drop(s) Both EYES three times a day PRN Dry Eyes  heparin   Injectable 7500 Unit(s) IV Push every 6 hours PRN For aPTT less than 40  heparin   Injectable 3500 Unit(s) IV Push every 6 hours PRN For aPTT between 40 - 57  HYDROmorphone  Injectable 0.5 milliGRAM(s) IV Push every 4 hours PRN Pain  ipratropium    for Nebulization 500 MICROGram(s) Nebulizer every 6 hours PRN Bronchospasm  melatonin 3 milliGRAM(s) Oral at bedtime PRN Insomnia  ondansetron Injectable 4 milliGRAM(s) IV Push every 8 hours PRN Nausea and/or Vomiting  sodium chloride 0.65% Nasal 1 Spray(s) Both Nostrils every 6 hours PRN Nasal Congestion  sodium chloride 0.9% lock flush 10 milliLiter(s) IV Push every 1 hour PRN Pre/post blood products, medications, blood draw, and to maintain line patency      CAPILLARY BLOOD GLUCOSE        I&O's Summary    10 Sep 2022 07:01  -  11 Sep 2022 07:00  --------------------------------------------------------  IN: 858 mL / OUT: 1900 mL / NET: -1042 mL        PHYSICAL EXAM:  Vital Signs Last 24 Hrs  T(C): 37.1 (11 Sep 2022 05:12), Max: 37.6 (10 Sep 2022 09:16)  T(F): 98.7 (11 Sep 2022 05:12), Max: 99.7 (10 Sep 2022 09:16)  HR: 92 (11 Sep 2022 05:12) (83 - 111)  BP: 105/60 (11 Sep 2022 05:12) (98/60 - 118/74)  BP(mean): --  RR: 18 (11 Sep 2022 05:12) (18 - 18)  SpO2: 94% (11 Sep 2022 05:12) (94% - 99%)    Parameters below as of 11 Sep 2022 05:12  Patient On (Oxygen Delivery Method): room air        GENERAL: No apparent distress.   HEAD:  Atraumatic, Normocephalic  EYES: EOMI, PERRLA, conjunctiva and sclera clear  NECK: Supple, no lymphadenopathy, no JVD  CHEST/LUNG: Clear to auscultation bilateral and symmetric; No wheezes, rales, or rhonchi  HEART: S1 and S2 normal. Regular rate and rhythm; No murmurs, rubs, or gallops  ABDOMEN: Soft, non-tender, non-distended; normal bowel sounds  EXTREMITIES:  2+ peripheral pulses b/l, No clubbing, cyanosis, or edema  NEUROLOGY: A&O x 3, no focal deficits  SKIN: No rashes or lesions    LABS:                        10.7   12.87 )-----------( 360      ( 10 Sep 2022 11:52 )             34.4     09-10    133<L>  |  100  |  12  ----------------------------<  140<H>  4.5   |  23  |  0.90    Ca    8.5      10 Sep 2022 11:53  Phos  2.8     09-10  Mg     1.8     09-10    TPro  6.7  /  Alb  3.5  /  TBili  0.6  /  DBili  x   /  AST  25  /  ALT  41  /  AlkPhos  57  09-10    PTT - ( 10 Sep 2022 11:53 )  PTT:68.8 sec          Culture - Abscess with Gram Stain (collected 08 Sep 2022 20:12)  Source: .Abscess Skin  Preliminary Report (10 Sep 2022 07:12):    Moderate Staphylococcus aureus    Culture - Abscess with Gram Stain (collected 08 Sep 2022 20:12)  Source: .Abscess Skin  Preliminary Report (10 Sep 2022 07:14):    Few Staphylococcus aureus        RADIOLOGY & ADDITIONAL TESTS:  Lab Results Reviewed   Imaging Reviewed  Electrocardiogram Reviewed   PROGRESS NOTE:   Authored by Alfonso Simons MD   Patient is a 42y old  Female who presents with a chief complaint of Palpitations (10 Sep 2022 13:16)      SUBJECTIVE / OVERNIGHT EVENTS:  Left leg pain improved, abd pain improved, right arm tightness.  No acute events overnight. No chest pain,  palpitations, dyspnea, abdominal pain. nausea, vomiting, diarrhea, blurry vision, dysuria, lightheadedness, dizziness.    ADDITIONAL REVIEW OF SYSTEMS:    MEDICATIONS  (STANDING):  ceFAZolin   IVPB 2000 milliGRAM(s) IV Intermittent every 8 hours  ceFAZolin   IVPB      chlorhexidine 4% Liquid 1 Application(s) Topical <User Schedule>  cyanocobalamin 1000 MICROGram(s) Oral daily  furosemide    Tablet 20 milliGRAM(s) Oral <User Schedule>  heparin  Infusion. 1400 Unit(s)/Hr (14 mL/Hr) IV Continuous <Continuous>  lidocaine 1% Injectable 50 milliLiter(s) Local Injection once  multivitamin 1 Tablet(s) Oral daily  pantoprazole    Tablet 40 milliGRAM(s) Oral before breakfast  polyethylene glycol 3350 17 Gram(s) Oral every 12 hours  predniSONE   Tablet 10 milliGRAM(s) Oral daily  Remodulin (Treprostinil) 5mG/mL 1 Vial(s) 1 Vial(s) SubCutaneous Continuous Pump  senna 2 Tablet(s) Oral at bedtime  sildenafil (REVATIO) 20 milliGRAM(s) Oral <User Schedule>  spironolactone 25 milliGRAM(s) Oral daily    MEDICATIONS  (PRN):  acetaminophen     Tablet .. 650 milliGRAM(s) Oral every 6 hours PRN Mild Pain (1 - 3)  aluminum hydroxide/magnesium hydroxide/simethicone Suspension 30 milliLiter(s) Oral every 4 hours PRN Dyspepsia  artificial  tears Solution 1 Drop(s) Both EYES three times a day PRN Dry Eyes  heparin   Injectable 7500 Unit(s) IV Push every 6 hours PRN For aPTT less than 40  heparin   Injectable 3500 Unit(s) IV Push every 6 hours PRN For aPTT between 40 - 57  HYDROmorphone  Injectable 0.5 milliGRAM(s) IV Push every 4 hours PRN Pain  ipratropium    for Nebulization 500 MICROGram(s) Nebulizer every 6 hours PRN Bronchospasm  melatonin 3 milliGRAM(s) Oral at bedtime PRN Insomnia  ondansetron Injectable 4 milliGRAM(s) IV Push every 8 hours PRN Nausea and/or Vomiting  sodium chloride 0.65% Nasal 1 Spray(s) Both Nostrils every 6 hours PRN Nasal Congestion  sodium chloride 0.9% lock flush 10 milliLiter(s) IV Push every 1 hour PRN Pre/post blood products, medications, blood draw, and to maintain line patency      CAPILLARY BLOOD GLUCOSE        I&O's Summary    10 Sep 2022 07:01  -  11 Sep 2022 07:00  --------------------------------------------------------  IN: 858 mL / OUT: 1900 mL / NET: -1042 mL        PHYSICAL EXAM:  Vital Signs Last 24 Hrs  T(C): 37.1 (11 Sep 2022 05:12), Max: 37.6 (10 Sep 2022 09:16)  T(F): 98.7 (11 Sep 2022 05:12), Max: 99.7 (10 Sep 2022 09:16)  HR: 92 (11 Sep 2022 05:12) (83 - 111)  BP: 105/60 (11 Sep 2022 05:12) (98/60 - 118/74)  BP(mean): --  RR: 18 (11 Sep 2022 05:12) (18 - 18)  SpO2: 94% (11 Sep 2022 05:12) (94% - 99%)    Parameters below as of 11 Sep 2022 05:12  Patient On (Oxygen Delivery Method): room air        GENERAL: No apparent distress.   HEAD:  Atraumatic, Normocephalic  EYES: EOMI, PERRLA, conjunctiva and sclera clear  NECK: Supple, no lymphadenopathy, no JVD  CHEST/LUNG: Clear to auscultation bilateral and symmetric; No wheezes, rales, or rhonchi  HEART: S1 and S2 normal. Regular rate and rhythm; No murmurs, rubs, or gallops  ABDOMEN: Soft, non-tender, non-distended; normal bowel sounds  EXTREMITIES:  2+ peripheral pulses b/l, No clubbing, cyanosis, or edema  NEUROLOGY: A&O x 3, no focal deficits  SKIN: No rashes or lesions    LABS:                        10.7   12.87 )-----------( 360      ( 10 Sep 2022 11:52 )             34.4     09-10    133<L>  |  100  |  12  ----------------------------<  140<H>  4.5   |  23  |  0.90    Ca    8.5      10 Sep 2022 11:53  Phos  2.8     09-10  Mg     1.8     09-10    TPro  6.7  /  Alb  3.5  /  TBili  0.6  /  DBili  x   /  AST  25  /  ALT  41  /  AlkPhos  57  09-10    PTT - ( 10 Sep 2022 11:53 )  PTT:68.8 sec          Culture - Abscess with Gram Stain (collected 08 Sep 2022 20:12)  Source: .Abscess Skin  Preliminary Report (10 Sep 2022 07:12):    Moderate Staphylococcus aureus    Culture - Abscess with Gram Stain (collected 08 Sep 2022 20:12)  Source: .Abscess Skin  Preliminary Report (10 Sep 2022 07:14):    Few Staphylococcus aureus        RADIOLOGY & ADDITIONAL TESTS:  Lab Results Reviewed   Imaging Reviewed  Electrocardiogram Reviewed

## 2022-09-11 NOTE — PROGRESS NOTE ADULT - ASSESSMENT
[facility] DIVISION OF PULMONARY, CRITICAL CARE and SLEEP MEDICINE  PULMONARY PROGRESS NOTE  OFFICE NUMBER: [phone]    PATIENT INFORMATION:  NAME: ROXI MARIA:  MRN: [id number]    CHIEF COMPLAINT: Patient is a 42y old  Female who presents with a chief complaint of Palpitations (11 Sep 2022 07:06)      [x] INITIAL CONSULT, H&P, FAMILY HISTORY and PAST MEDICAL AND SURGICAL HISTORY REVIEWED    OVERNIGHT EVENTS or CHANGES TO HPI:     ========================REVIEW OF SYSTEMS========================  CONSTITUTIONAL:  CARDIOVASCULAR:  PULMONARY:  [] REMAINING REVIEW OF SYSTEMS NEGATIVE  [] UNABLE TO OBTAIN REVIEW OF SYSTEMS DUE TO _______________    ========================MEDICATIONS=============================  MEDICATIONS  (STANDING):  ceFAZolin   IVPB 2000 milliGRAM(s) IV Intermittent every 8 hours  ceFAZolin   IVPB      chlorhexidine 4% Liquid 1 Application(s) Topical <User Schedule>  cyanocobalamin 1000 MICROGram(s) Oral daily  doxycycline monohydrate Capsule 100 milliGRAM(s) Oral every 12 hours  furosemide    Tablet 20 milliGRAM(s) Oral <User Schedule>  heparin  Infusion. 1400 Unit(s)/Hr (14 mL/Hr) IV Continuous <Continuous>  lidocaine 1% Injectable 50 milliLiter(s) Local Injection once  multivitamin 1 Tablet(s) Oral daily  pantoprazole    Tablet 40 milliGRAM(s) Oral before breakfast  polyethylene glycol 3350 17 Gram(s) Oral every 12 hours  predniSONE   Tablet 10 milliGRAM(s) Oral daily  Remodulin (Treprostinil) 5mG/mL 1 Vial(s) 1 Vial(s) SubCutaneous Continuous Pump  senna 2 Tablet(s) Oral at bedtime  sildenafil (REVATIO) 20 milliGRAM(s) Oral <User Schedule>  spironolactone 25 milliGRAM(s) Oral daily      MEDICATIONS  (PRN):  acetaminophen     Tablet .. 650 milliGRAM(s) Oral every 6 hours PRN Mild Pain (1 - 3)  aluminum hydroxide/magnesium hydroxide/simethicone Suspension 30 milliLiter(s) Oral every 4 hours PRN Dyspepsia  artificial  tears Solution 1 Drop(s) Both EYES three times a day PRN Dry Eyes  heparin   Injectable 7500 Unit(s) IV Push every 6 hours PRN For aPTT less than 40  heparin   Injectable 3500 Unit(s) IV Push every 6 hours PRN For aPTT between 40 - 57  HYDROmorphone  Injectable 0.5 milliGRAM(s) IV Push every 4 hours PRN Pain  ipratropium    for Nebulization 500 MICROGram(s) Nebulizer every 6 hours PRN Bronchospasm  melatonin 3 milliGRAM(s) Oral at bedtime PRN Insomnia  ondansetron Injectable 4 milliGRAM(s) IV Push every 8 hours PRN Nausea and/or Vomiting  sodium chloride 0.65% Nasal 1 Spray(s) Both Nostrils every 6 hours PRN Nasal Congestion  sodium chloride 0.9% lock flush 10 milliLiter(s) IV Push every 1 hour PRN Pre/post blood products, medications, blood draw, and to maintain line patency      ========================PHYSICAL EXAM============================    VITALS: ICU Vital Signs Last 24 Hrs  T(C): 37.1 (11 Sep 2022 08:41), Max: 37.1 (11 Sep 2022 05:12)  T(F): 98.7 (11 Sep 2022 08:41), Max: 98.7 (11 Sep 2022 05:12)  HR: 93 (11 Sep 2022 08:41) (83 - 103)  BP: 106/72 (11 Sep 2022 08:41) (98/60 - 116/70)  BP(mean): --  ABP: --  ABP(mean): --  RR: 18 (11 Sep 2022 08:41) (18 - 18)  SpO2: 98% (11 Sep 2022 08:41) (94% - 99%)    O2 Parameters below as of 11 Sep 2022 08:41  Patient On (Oxygen Delivery Method): room air            INTAKE and OUTPUT: I&O's Summary    10 Sep 2022 07:01  -  11 Sep 2022 07:00  --------------------------------------------------------  IN: 858 mL / OUT: 1900 mL / NET: -1042 mL    11 Sep 2022 07:01  -  11 Sep 2022 13:39  --------------------------------------------------------  IN: 290 mL / OUT: 1000 mL / NET: -710 mL        VENTILATOR SETTINGS:     GENERAL:   EYES:  EAR/NOSE/MOUTH/THROAT:  NECK:  LYMPH NODES:  CARDIOVASCULAR:  RESPIRATORY:  ABDOMEN:  EXTREMITIES  SKIN:  MUSCULOSKELETAL:   NEUROLOGIC:  PSYCHIATRIC    ========================LABORATORY RESULTS AND IMAGING=============                        10.8   13.99 )-----------( 217      ( 11 Sep 2022 10:56 )             35.2                                                    09-11    133<L>  |  100  |  13  ----------------------------<  116<H>  5.1   |  25  |  0.88    Ca    8.8      11 Sep 2022 10:56  Phos  3.0     09-11  Mg     1.9     09-11    TPro  6.7  /  Alb  3.6  /  TBili  0.5  /  DBili  x   /  AST  21  /  ALT  29  /  AlkPhos  57  09-11          Creatinine Trend: 0.88<--, 0.90<--, 1.11<--, 0.88<--, 0.76<--, 0.82<--    CT CHEST:     [] RADIOLOGY REVIEWED AND INTERPRETED BY ME      THANK YOU FOR ALLOWING US TO PARTICIPATE IN THE CARE OF THIS PATIENT     42F PMHx of pulmonary HTN (group I and group IV; on Treprostinil and Xarelto), home o2 (2L), ILD, JULIA, obesity, and PPM presents on 8/28 for pain, nausea and vomiting, found to be in profound RV failure s/p dobutamine, Marcela, and aggressive diuresis. Patient also noted to have RA clot in transit on TTE s/p mechanical thrombectomy which was complicated by left groin pseudoaneurysm s/p thrombin injection. Course complicated by EDOUARD likely cardiorenal which has improved.    TTE done which showed hyperdynamic and compressed LV, severe RV dysfunction, mod TR, dilated IVC. Repeat TTE with improved RV function however RA thrombus noted.   CTA:  marked enlargement of PA. No PE. B/L GGOs.     TTE deferred as she is high risk for anesthesia.  She is being treated for coag negative staph bacteremia.  Now with thrombus in the radial vein and cephalic vein.  IV access has been difficult and heparin held until if is established.  Anticoagulation will be resumed ASAP.    She is HD stable and stable from oxygenation standpoint. Appears well today.  Leg pain is improving and she is ambulating with walker.  Plan:  Continue PH regimen  Continue IV heparin monitoring PTT and Hb  OOB to chair and ambulation daily  Oxygen to maintain sats >90%  Continue Cefazolin for total 4 weeks

## 2022-09-11 NOTE — PROGRESS NOTE ADULT - PROBLEM SELECTOR PLAN 2
- Staph lugdunesis 8/29 bcx, coagulase-negative staph treated in a similar manner to staph aureus  - Likely source is abscess of the rt subcutaneous port  - BCx 9/7 - NGTD  - Per anesthesia, MYRIAM is high risk and needs to be done under general anesthesia with intubation. Per pulmonology, patient is high risk given her pulmonary hypertension and is not able to undergo procedure.   - Cefazolin 2g q 8 x 4 week duration  - CT A&P showing no evidence of abscess present in left leg hematoma.   - s/p I&D (9/8/22)  - f/u wound culture. - Staph lugdunesis 8/29 bcx, coagulase-negative staph treated in a similar manner to staph aureus  - Likely source is abscess of the rt subcutaneous port  - BCx 9/7 - NGTD  - Per anesthesia, MYRIAM is high risk and needs to be done under general anesthesia with intubation. Per pulmonology, patient is high risk given her pulmonary hypertension and is not able to undergo procedure.   - Cefazolin 2g q 8 x 4 week duration  - CT A&P showing no evidence of abscess present in left leg hematoma.   - s/p I&D (9/8/22)  - wound culture - MRSA  - doxycycline 100 mg BID x course to be determined

## 2022-09-11 NOTE — PROGRESS NOTE ADULT - SUBJECTIVE AND OBJECTIVE BOX
[facility] Progress Note  Patient is a 42y old  Female who presents with a chief complaint of Palpitations (11 Sep 2022 13:38)      INTERVAL EVENTS: Patient is POD# ***No acute events overnight.  SUBJECTIVE: Patient seen and examined at bedside with surgical team, patient without complaints. Denies fever, chills, CP, SOB nausea, vomiting, abdominal pain.    REVIEW OF SYSTEMS:  Constitutional: No fevers or chills. No malaise or weakness.  EENT: No vision changes. No ear pain. No nasal congestion or rhinitis. No throat pain or dysphagia.  Respiratory: No cough, wheezing, or SOB. No hemoptysis.  Cardiovascular: No chest pain or palpitations.  Gastrointestinal: No abdominal pain. No nausea, vomiting, diarrhea or constipation. No hematochezia. No melena.  Genitourinary: No dysuria, hematuria, or frequency.  Neurologic: No numbness or tingling. No weakness.  Skin: No rashes or pruritus.     OBJECTIVE:    Vital Signs Last 24 Hrs  T(C): 37.1 (11 Sep 2022 08:41), Max: 37.1 (11 Sep 2022 05:12)  T(F): 98.7 (11 Sep 2022 08:41), Max: 98.7 (11 Sep 2022 05:12)  HR: 117 (11 Sep 2022 14:56) (85 - 117)  BP: 145/110 (11 Sep 2022 14:56) (98/60 - 145/110)  BP(mean): --  RR: 18 (11 Sep 2022 14:56) (18 - 18)  SpO2: 98% (11 Sep 2022 14:56) (94% - 99%)    Parameters below as of 11 Sep 2022 14:56  Patient On (Oxygen Delivery Method): room air    I&O's Detail    10 Sep 2022 07:01  -  11 Sep 2022 07:00  --------------------------------------------------------  IN:    Heparin Infusion: 168 mL    IV PiggyBack: 150 mL    Oral Fluid: 540 mL  Total IN: 858 mL    OUT:    Voided (mL): 1900 mL  Total OUT: 1900 mL    Total NET: -1042 mL      11 Sep 2022 07:01  -  11 Sep 2022 15:41  --------------------------------------------------------  IN:    IV PiggyBack: 50 mL    Oral Fluid: 540 mL  Total IN: 590 mL    OUT:    Voided (mL): 1500 mL  Total OUT: 1500 mL    Total NET: -910 mL      MEDICATIONS  (STANDING):  ceFAZolin   IVPB 2000 milliGRAM(s) IV Intermittent every 8 hours  ceFAZolin   IVPB      chlorhexidine 4% Liquid 1 Application(s) Topical <User Schedule>  cyanocobalamin 1000 MICROGram(s) Oral daily  doxycycline monohydrate Capsule 100 milliGRAM(s) Oral every 12 hours  furosemide    Tablet 20 milliGRAM(s) Oral <User Schedule>  heparin  Infusion. 1400 Unit(s)/Hr (14 mL/Hr) IV Continuous <Continuous>  lidocaine 1% Injectable 50 milliLiter(s) Local Injection once  multivitamin 1 Tablet(s) Oral daily  pantoprazole    Tablet 40 milliGRAM(s) Oral before breakfast  polyethylene glycol 3350 17 Gram(s) Oral every 12 hours  predniSONE   Tablet 10 milliGRAM(s) Oral daily  Remodulin (Treprostinil) 5mG/mL 1 Vial(s) 1 Vial(s) SubCutaneous Continuous Pump  senna 2 Tablet(s) Oral at bedtime  sildenafil (REVATIO) 20 milliGRAM(s) Oral <User Schedule>  spironolactone 25 milliGRAM(s) Oral daily    MEDICATIONS  (PRN):  acetaminophen     Tablet .. 650 milliGRAM(s) Oral every 6 hours PRN Mild Pain (1 - 3)  aluminum hydroxide/magnesium hydroxide/simethicone Suspension 30 milliLiter(s) Oral every 4 hours PRN Dyspepsia  artificial  tears Solution 1 Drop(s) Both EYES three times a day PRN Dry Eyes  heparin   Injectable 7500 Unit(s) IV Push every 6 hours PRN For aPTT less than 40  heparin   Injectable 3500 Unit(s) IV Push every 6 hours PRN For aPTT between 40 - 57  HYDROmorphone  Injectable 0.5 milliGRAM(s) IV Push every 4 hours PRN Pain  ipratropium    for Nebulization 500 MICROGram(s) Nebulizer every 6 hours PRN Bronchospasm  melatonin 3 milliGRAM(s) Oral at bedtime PRN Insomnia  ondansetron Injectable 4 milliGRAM(s) IV Push every 8 hours PRN Nausea and/or Vomiting  sodium chloride 0.65% Nasal 1 Spray(s) Both Nostrils every 6 hours PRN Nasal Congestion  sodium chloride 0.9% lock flush 10 milliLiter(s) IV Push every 1 hour PRN Pre/post blood products, medications, blood draw, and to maintain line patency      PHYSICAL EXAM:  Constitutional: A&Ox3, NAD  Respiratory: Unlabored breathing  Abdomen: small 2cm wound where mediport was previously, packing removed  Extremities: WWP, MARTE spontaneously    LABS:                        10.8   13.99 )-----------( 217      ( 11 Sep 2022 10:56 )             35.2     09-11    133<L>  |  100  |  13  ----------------------------<  116<H>  5.1   |  25  |  0.88    Ca    8.8      11 Sep 2022 10:56  Phos  3.0     09-11  Mg     1.9     09-11    TPro  6.7  /  Alb  3.6  /  TBili  0.5  /  DBili  x   /  AST  21  /  ALT  29  /  AlkPhos  57  09-11    PTT - ( 11 Sep 2022 10:56 )  PTT:54.0 sec  LIVER FUNCTIONS - ( 11 Sep 2022 10:56 )  Alb: 3.6 g/dL / Pro: 6.7 g/dL / ALK PHOS: 57 U/L / ALT: 29 U/L / AST: 21 U/L / GGT: x                 IMAGING:

## 2022-09-11 NOTE — PROGRESS NOTE ADULT - SUBJECTIVE AND OBJECTIVE BOX
[facility] DIVISION OF PULMONARY, CRITICAL CARE and SLEEP MEDICINE  PULMONARY PROGRESS NOTE  OFFICE NUMBER: [phone]    PATIENT INFORMATION:  NAME: ROXI MARIA:  MRN: [id number]    CHIEF COMPLAINT: Patient is a 42y old  Female who presents with a chief complaint of Palpitations (11 Sep 2022 13:38)      [x] INITIAL CONSULT, H&P, FAMILY HISTORY and PAST MEDICAL AND SURGICAL HISTORY REVIEWED    OVERNIGHT EVENTS or CHANGES TO HPI: no events overnight    ========================REVIEW OF SYSTEMS========================  CONSTITUTIONAL: complaining of some knee pain today, leg is improved  CARDIOVASCULAR:  PULMONARY: no shortness of breath  x[] REMAINING REVIEW OF SYSTEMS NEGATIVE  [] UNABLE TO OBTAIN REVIEW OF SYSTEMS DUE TO _______________    ========================MEDICATIONS=============================  MEDICATIONS  (STANDING):  ceFAZolin   IVPB 2000 milliGRAM(s) IV Intermittent every 8 hours  ceFAZolin   IVPB      chlorhexidine 4% Liquid 1 Application(s) Topical <User Schedule>  cyanocobalamin 1000 MICROGram(s) Oral daily  doxycycline monohydrate Capsule 100 milliGRAM(s) Oral every 12 hours  furosemide    Tablet 20 milliGRAM(s) Oral <User Schedule>  heparin  Infusion. 1400 Unit(s)/Hr (14 mL/Hr) IV Continuous <Continuous>  lidocaine 1% Injectable 50 milliLiter(s) Local Injection once  multivitamin 1 Tablet(s) Oral daily  pantoprazole    Tablet 40 milliGRAM(s) Oral before breakfast  polyethylene glycol 3350 17 Gram(s) Oral every 12 hours  predniSONE   Tablet 10 milliGRAM(s) Oral daily  Remodulin (Treprostinil) 5mG/mL 1 Vial(s) 1 Vial(s) SubCutaneous Continuous Pump  senna 2 Tablet(s) Oral at bedtime  sildenafil (REVATIO) 20 milliGRAM(s) Oral <User Schedule>  spironolactone 25 milliGRAM(s) Oral daily      MEDICATIONS  (PRN):  acetaminophen     Tablet .. 650 milliGRAM(s) Oral every 6 hours PRN Mild Pain (1 - 3)  aluminum hydroxide/magnesium hydroxide/simethicone Suspension 30 milliLiter(s) Oral every 4 hours PRN Dyspepsia  artificial  tears Solution 1 Drop(s) Both EYES three times a day PRN Dry Eyes  heparin   Injectable 7500 Unit(s) IV Push every 6 hours PRN For aPTT less than 40  heparin   Injectable 3500 Unit(s) IV Push every 6 hours PRN For aPTT between 40 - 57  HYDROmorphone  Injectable 0.5 milliGRAM(s) IV Push every 4 hours PRN Pain  ipratropium    for Nebulization 500 MICROGram(s) Nebulizer every 6 hours PRN Bronchospasm  melatonin 3 milliGRAM(s) Oral at bedtime PRN Insomnia  ondansetron Injectable 4 milliGRAM(s) IV Push every 8 hours PRN Nausea and/or Vomiting  sodium chloride 0.65% Nasal 1 Spray(s) Both Nostrils every 6 hours PRN Nasal Congestion  sodium chloride 0.9% lock flush 10 milliLiter(s) IV Push every 1 hour PRN Pre/post blood products, medications, blood draw, and to maintain line patency      ========================PHYSICAL EXAM============================    VITALS: ICU Vital Signs Last 24 Hrs  T(C): 37.1 (11 Sep 2022 08:41), Max: 37.1 (11 Sep 2022 05:12)  T(F): 98.7 (11 Sep 2022 08:41), Max: 98.7 (11 Sep 2022 05:12)  HR: 117 (11 Sep 2022 14:56) (85 - 117)  BP: 145/110 (11 Sep 2022 14:56) (98/60 - 145/110)  BP(mean): --  ABP: --  ABP(mean): --  RR: 18 (11 Sep 2022 14:56) (18 - 18)  SpO2: 98% (11 Sep 2022 14:56) (94% - 99%)    O2 Parameters below as of 11 Sep 2022 14:56  Patient On (Oxygen Delivery Method): room air            INTAKE and OUTPUT: I&O's Summary    10 Sep 2022 07:01  -  11 Sep 2022 07:00  --------------------------------------------------------  IN: 858 mL / OUT: 1900 mL / NET: -1042 mL    11 Sep 2022 07:01  -  11 Sep 2022 15:18  --------------------------------------------------------  IN: 590 mL / OUT: 1500 mL / NET: -910 mL        VENTILATOR SETTINGS:     GENERAL: awake alert sitting up at bedside with PT  EYES:  EAR/NOSE/MOUTH/THROAT:  NECK:  LYMPH NODES:  CARDIOVASCULAR: RRR  RESPIRATORY: clear  ABDOMEN:  EXTREMITIES no edema  SKIN:  MUSCULOSKELETAL:   NEUROLOGIC: awake alert  PSYCHIATRIC    ========================LABORATORY RESULTS AND IMAGING=============                        10.8   13.99 )-----------( 217      ( 11 Sep 2022 10:56 )             35.2                                                    09-11    133<L>  |  100  |  13  ----------------------------<  116<H>  5.1   |  25  |  0.88    Ca    8.8      11 Sep 2022 10:56  Phos  3.0     09-11  Mg     1.9     09-11    TPro  6.7  /  Alb  3.6  /  TBili  0.5  /  DBili  x   /  AST  21  /  ALT  29  /  AlkPhos  57  09-11          Creatinine Trend: 0.88<--, 0.90<--, 1.11<--, 0.88<--, 0.76<--, 0.82<--    CT CHEST:     [] RADIOLOGY REVIEWED AND INTERPRETED BY ME      THANK YOU FOR ALLOWING US TO PARTICIPATE IN THE CARE OF THIS PATIENT

## 2022-09-11 NOTE — PROGRESS NOTE ADULT - ATTENDING COMMENTS
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 EDOUARD likely secondary to venous congestion- resolved.     Now transferred out of the ICU.     Staph lugdunensis bacteremia- continue Ancef. Subcutaneous abscess secondary to device- I&D growing MRSA. Plan for four week course of abx for bacteremia. F/u ID regarding if MRSA abscess s/p ID needs to be treated .     High risk for MYRIAM- deferred.     Worsening groin hematoma- heparin held initially, restarted 9/9 as found to have R cephalic v thrombosis. Continue to monitor, no sign of worsening.     Has a midline for access.

## 2022-09-12 LAB
ALBUMIN SERPL ELPH-MCNC: 3.4 G/DL — SIGNIFICANT CHANGE UP (ref 3.3–5)
ALP SERPL-CCNC: 51 U/L — SIGNIFICANT CHANGE UP (ref 40–120)
ALT FLD-CCNC: 26 U/L — SIGNIFICANT CHANGE UP (ref 10–45)
ANION GAP SERPL CALC-SCNC: 11 MMOL/L — SIGNIFICANT CHANGE UP (ref 5–17)
APTT BLD: 128.7 SEC — CRITICAL HIGH (ref 27.5–35.5)
APTT BLD: 51.6 SEC — HIGH (ref 27.5–35.5)
APTT BLD: 64.4 SEC — HIGH (ref 27.5–35.5)
AST SERPL-CCNC: 22 U/L — SIGNIFICANT CHANGE UP (ref 10–40)
BILIRUB SERPL-MCNC: 0.6 MG/DL — SIGNIFICANT CHANGE UP (ref 0.2–1.2)
BUN SERPL-MCNC: 12 MG/DL — SIGNIFICANT CHANGE UP (ref 7–23)
CALCIUM SERPL-MCNC: 8.8 MG/DL — SIGNIFICANT CHANGE UP (ref 8.4–10.5)
CHLORIDE SERPL-SCNC: 101 MMOL/L — SIGNIFICANT CHANGE UP (ref 96–108)
CO2 SERPL-SCNC: 25 MMOL/L — SIGNIFICANT CHANGE UP (ref 22–31)
CREAT SERPL-MCNC: 0.89 MG/DL — SIGNIFICANT CHANGE UP (ref 0.5–1.3)
CULTURE RESULTS: SIGNIFICANT CHANGE UP
CULTURE RESULTS: SIGNIFICANT CHANGE UP
EGFR: 83 ML/MIN/1.73M2 — SIGNIFICANT CHANGE UP
GLUCOSE SERPL-MCNC: 80 MG/DL — SIGNIFICANT CHANGE UP (ref 70–99)
HCT VFR BLD CALC: 34.4 % — LOW (ref 34.5–45)
HCT VFR BLD CALC: 35.8 % — SIGNIFICANT CHANGE UP (ref 34.5–45)
HGB BLD-MCNC: 10.7 G/DL — LOW (ref 11.5–15.5)
HGB BLD-MCNC: 11 G/DL — LOW (ref 11.5–15.5)
MAGNESIUM SERPL-MCNC: 1.8 MG/DL — SIGNIFICANT CHANGE UP (ref 1.6–2.6)
MCHC RBC-ENTMCNC: 22.8 PG — LOW (ref 27–34)
MCHC RBC-ENTMCNC: 23.1 PG — LOW (ref 27–34)
MCHC RBC-ENTMCNC: 30.7 GM/DL — LOW (ref 32–36)
MCHC RBC-ENTMCNC: 31.1 GM/DL — LOW (ref 32–36)
MCV RBC AUTO: 74.1 FL — LOW (ref 80–100)
MCV RBC AUTO: 74.1 FL — LOW (ref 80–100)
NRBC # BLD: 0 /100 WBCS — SIGNIFICANT CHANGE UP (ref 0–0)
NRBC # BLD: 0 /100 WBCS — SIGNIFICANT CHANGE UP (ref 0–0)
PHOSPHATE SERPL-MCNC: 4.4 MG/DL — SIGNIFICANT CHANGE UP (ref 2.5–4.5)
PLATELET # BLD AUTO: 345 K/UL — SIGNIFICANT CHANGE UP (ref 150–400)
PLATELET # BLD AUTO: 367 K/UL — SIGNIFICANT CHANGE UP (ref 150–400)
POTASSIUM SERPL-MCNC: 4 MMOL/L — SIGNIFICANT CHANGE UP (ref 3.5–5.3)
POTASSIUM SERPL-SCNC: 4 MMOL/L — SIGNIFICANT CHANGE UP (ref 3.5–5.3)
PROT SERPL-MCNC: 6.7 G/DL — SIGNIFICANT CHANGE UP (ref 6–8.3)
RBC # BLD: 4.64 M/UL — SIGNIFICANT CHANGE UP (ref 3.8–5.2)
RBC # BLD: 4.83 M/UL — SIGNIFICANT CHANGE UP (ref 3.8–5.2)
RBC # FLD: 19.2 % — HIGH (ref 10.3–14.5)
RBC # FLD: 19.3 % — HIGH (ref 10.3–14.5)
SODIUM SERPL-SCNC: 137 MMOL/L — SIGNIFICANT CHANGE UP (ref 135–145)
SPECIMEN SOURCE: SIGNIFICANT CHANGE UP
SPECIMEN SOURCE: SIGNIFICANT CHANGE UP
WBC # BLD: 14.76 K/UL — HIGH (ref 3.8–10.5)
WBC # BLD: 14.88 K/UL — HIGH (ref 3.8–10.5)
WBC # FLD AUTO: 14.76 K/UL — HIGH (ref 3.8–10.5)
WBC # FLD AUTO: 14.88 K/UL — HIGH (ref 3.8–10.5)

## 2022-09-12 PROCEDURE — 99233 SBSQ HOSP IP/OBS HIGH 50: CPT | Mod: GC

## 2022-09-12 PROCEDURE — 93926 LOWER EXTREMITY STUDY: CPT | Mod: 26,LT

## 2022-09-12 PROCEDURE — 99233 SBSQ HOSP IP/OBS HIGH 50: CPT

## 2022-09-12 PROCEDURE — 99356: CPT

## 2022-09-12 PROCEDURE — 99232 SBSQ HOSP IP/OBS MODERATE 35: CPT

## 2022-09-12 RX ORDER — HEPARIN SODIUM 5000 [USP'U]/ML
7500 INJECTION INTRAVENOUS; SUBCUTANEOUS EVERY 6 HOURS
Refills: 0 | Status: DISCONTINUED | OUTPATIENT
Start: 2022-09-12 | End: 2022-09-12

## 2022-09-12 RX ORDER — OXYCODONE HYDROCHLORIDE 5 MG/1
5 TABLET ORAL EVERY 8 HOURS
Refills: 0 | Status: DISCONTINUED | OUTPATIENT
Start: 2022-09-12 | End: 2022-09-12

## 2022-09-12 RX ORDER — RIVAROXABAN 15 MG-20MG
10 KIT ORAL
Refills: 0 | Status: DISCONTINUED | OUTPATIENT
Start: 2022-09-12 | End: 2022-09-12

## 2022-09-12 RX ORDER — HEPARIN SODIUM 5000 [USP'U]/ML
INJECTION INTRAVENOUS; SUBCUTANEOUS
Qty: 25000 | Refills: 0 | Status: DISCONTINUED | OUTPATIENT
Start: 2022-09-12 | End: 2022-09-12

## 2022-09-12 RX ORDER — HEPARIN SODIUM 5000 [USP'U]/ML
3500 INJECTION INTRAVENOUS; SUBCUTANEOUS EVERY 6 HOURS
Refills: 0 | Status: DISCONTINUED | OUTPATIENT
Start: 2022-09-12 | End: 2022-09-12

## 2022-09-12 RX ORDER — RIVAROXABAN 15 MG-20MG
20 KIT ORAL
Refills: 0 | Status: DISCONTINUED | OUTPATIENT
Start: 2022-09-12 | End: 2022-09-14

## 2022-09-12 RX ORDER — HEPARIN SODIUM 5000 [USP'U]/ML
1000 INJECTION INTRAVENOUS; SUBCUTANEOUS
Qty: 25000 | Refills: 0 | Status: DISCONTINUED | OUTPATIENT
Start: 2022-09-12 | End: 2022-09-12

## 2022-09-12 RX ORDER — HYDROMORPHONE HYDROCHLORIDE 2 MG/ML
2 INJECTION INTRAMUSCULAR; INTRAVENOUS; SUBCUTANEOUS EVERY 6 HOURS
Refills: 0 | Status: DISCONTINUED | OUTPATIENT
Start: 2022-09-12 | End: 2022-09-14

## 2022-09-12 RX ADMIN — HEPARIN SODIUM 1600 UNIT(S)/HR: 5000 INJECTION INTRAVENOUS; SUBCUTANEOUS at 00:47

## 2022-09-12 RX ADMIN — Medication 10 MILLIGRAM(S): at 06:12

## 2022-09-12 RX ADMIN — SPIRONOLACTONE 25 MILLIGRAM(S): 25 TABLET, FILM COATED ORAL at 09:17

## 2022-09-12 RX ADMIN — SENNA PLUS 2 TABLET(S): 8.6 TABLET ORAL at 21:03

## 2022-09-12 RX ADMIN — Medication 100 MILLIGRAM(S): at 17:38

## 2022-09-12 RX ADMIN — Medication 1 TABLET(S): at 11:49

## 2022-09-12 RX ADMIN — Medication 650 MILLIGRAM(S): at 02:20

## 2022-09-12 RX ADMIN — Medication 100 MILLIGRAM(S): at 01:20

## 2022-09-12 RX ADMIN — Medication 650 MILLIGRAM(S): at 01:19

## 2022-09-12 RX ADMIN — HEPARIN SODIUM 1300 UNIT(S)/HR: 5000 INJECTION INTRAVENOUS; SUBCUTANEOUS at 10:15

## 2022-09-12 RX ADMIN — HYDROMORPHONE HYDROCHLORIDE 0.5 MILLIGRAM(S): 2 INJECTION INTRAMUSCULAR; INTRAVENOUS; SUBCUTANEOUS at 02:44

## 2022-09-12 RX ADMIN — HEPARIN SODIUM 1000 UNIT(S)/HR: 5000 INJECTION INTRAVENOUS; SUBCUTANEOUS at 10:59

## 2022-09-12 RX ADMIN — HYDROMORPHONE HYDROCHLORIDE 2 MILLIGRAM(S): 2 INJECTION INTRAMUSCULAR; INTRAVENOUS; SUBCUTANEOUS at 21:33

## 2022-09-12 RX ADMIN — HEPARIN SODIUM 0 UNIT(S)/HR: 5000 INJECTION INTRAVENOUS; SUBCUTANEOUS at 09:11

## 2022-09-12 RX ADMIN — Medication 500 MICROGRAM(S): at 13:54

## 2022-09-12 RX ADMIN — RIVAROXABAN 20 MILLIGRAM(S): KIT at 21:07

## 2022-09-12 RX ADMIN — Medication 100 MILLIGRAM(S): at 09:17

## 2022-09-12 RX ADMIN — Medication 100 MILLIGRAM(S): at 06:12

## 2022-09-12 RX ADMIN — PANTOPRAZOLE SODIUM 40 MILLIGRAM(S): 20 TABLET, DELAYED RELEASE ORAL at 06:12

## 2022-09-12 RX ADMIN — OXYCODONE HYDROCHLORIDE 5 MILLIGRAM(S): 5 TABLET ORAL at 14:16

## 2022-09-12 RX ADMIN — Medication 100 MILLIGRAM(S): at 17:39

## 2022-09-12 RX ADMIN — PREGABALIN 1000 MICROGRAM(S): 225 CAPSULE ORAL at 11:49

## 2022-09-12 RX ADMIN — HYDROMORPHONE HYDROCHLORIDE 2 MILLIGRAM(S): 2 INJECTION INTRAMUSCULAR; INTRAVENOUS; SUBCUTANEOUS at 21:03

## 2022-09-12 RX ADMIN — CHLORHEXIDINE GLUCONATE 1 APPLICATION(S): 213 SOLUTION TOPICAL at 09:14

## 2022-09-12 RX ADMIN — Medication 20 MILLIGRAM(S): at 21:03

## 2022-09-12 RX ADMIN — OXYCODONE HYDROCHLORIDE 5 MILLIGRAM(S): 5 TABLET ORAL at 14:46

## 2022-09-12 RX ADMIN — HYDROMORPHONE HYDROCHLORIDE 0.5 MILLIGRAM(S): 2 INJECTION INTRAMUSCULAR; INTRAVENOUS; SUBCUTANEOUS at 03:16

## 2022-09-12 RX ADMIN — HYDROMORPHONE HYDROCHLORIDE 0.5 MILLIGRAM(S): 2 INJECTION INTRAMUSCULAR; INTRAVENOUS; SUBCUTANEOUS at 09:34

## 2022-09-12 RX ADMIN — Medication 20 MILLIGRAM(S): at 06:13

## 2022-09-12 RX ADMIN — HYDROMORPHONE HYDROCHLORIDE 0.5 MILLIGRAM(S): 2 INJECTION INTRAMUSCULAR; INTRAVENOUS; SUBCUTANEOUS at 10:04

## 2022-09-12 RX ADMIN — Medication 20 MILLIGRAM(S): at 14:11

## 2022-09-12 NOTE — PROGRESS NOTE ADULT - PROBLEM SELECTOR PLAN 3
RUE US showing DVT in a radial vein at the distal upper arm and cephalic vein thrombus at the proximal upper arm.  - C/w heparin gtt RUE US showing DVT in a radial vein at the distal upper arm and cephalic vein thrombus at the proximal upper arm.  - starting xarelto 20 mg PO QD

## 2022-09-12 NOTE — PROGRESS NOTE ADULT - PROBLEM SELECTOR PLAN 1
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

## 2022-09-12 NOTE — PROGRESS NOTE ADULT - SUBJECTIVE AND OBJECTIVE BOX
Lung Transplant Progress Note  =====================================================  HPI:  42F PMH PE on xarelto, pHTN, ILD, JULIA, right sided heart failure, presents to the hospital for 2 days of palpitations. Patient reports she has had similar episodes of palpitations in the past, however workup in the past was unremarkable. Patient also reporting mild orthopnea. Denies chest pain. Denies diaphoresis. Denies recent use of stimulants including caffeine. Patient also reporting severe nausea and vomiting which has resolved with zofran in the ED. ROS otherwise unremarkable.    ED course: VS with tachycardia. CBC with anemia. CMP normal. BNP 7646. Troponin T normal. EKG with sinus tachycardia, CXR without consolidation. CT chest noncon revealed pulmonary artery dilations likely secondary to pulmonary hypertension. Patient now for admission to medicine for further evaluation and treatment.  (28 Aug 2022 15:36)      24 hour events: overall feeling well is on RA today at rest, she has been mobilizing but unable to walk due to L groin hematoma    PAST MEDICAL & SURGICAL HISTORY:  Tachycardia  Anemia  Pulmonary hypertension  Pulmonary embolism  Asthma  On home O2 nightly at 2L via NC  PE (pulmonary thromboembolism)  on Xarelto 10 mg daily  Sinusitis  Corneal disorder  Right heart failure  CAD (coronary artery disease)  H/O pulmonary hypertension  Pulmonary embolism  Asthma  History of tachycardia    On supplemental oxygen by nasal cannula  O2 @ 2L      Pacemaker  Skin mass  left upper thigh mass  History of tubal ligation  Pacemaker  H/O tubal ligation    History of pacemaker  12/19 - [employer] model Ingevity 4469    Home Meds: Home Medications:  Atrovent 500 mcg/2.5 mL inhalation solution: 2.5 milliliter(s) inhaled , As Needed (29 Aug 2022 12:31)  Atrovent HFA 17 mcg/inh inhalation aerosol: puff(s) inhaled , As Needed (29 Aug 2022 12:31)  Lasix 20 mg oral tablet: 1 tab(s) orally every other day (29 Aug 2022 12:31)  omeprazole 40 mg oral delayed release capsule: 1 cap(s) orally once a day (29 Aug 2022 12:31)  predniSONE: 10 milligram(s) orally once a day (29 Aug 2022 12:31)  Remodulin 5 mg/mL injectable solution: patient is taking 42ng/kg/min via her own SQ PUMP (29 Aug 2022 12:31)  sildenafil 20 mg oral tablet: 0.5 tab(s) orally once a day (29 Aug 2022 12:31)  Vitamin B12 1000 mcg oral tablet: 1 tab(s) orally once a day (29 Aug 2022 12:31)  Xarelto 10 mg oral tablet: 1 tab(s) orally once a day (29 Aug 2022 12:31)    Allergies: Allergies    adhesives (Rash)  penicillin (Rash)  vancomycin (Rash)    albuterol (Other; Rash)    Soc:   Advanced Directives: Presumed Full Code     ROS:    REVIEW OF SYSTEMS    [xx ] A ten-point review of systems was otherwise negative except as noted.  [ ] Due to altered mental status/intubation, subjective information were not able to be obtained from the patient. History was obtained, to the extent possible, from review of the chart and collateral sources of information.      CURRENT MEDICATIONS:   --------------------------------------------------------------------------------------  Neurologic Medications  acetaminophen     Tablet .. 650 milliGRAM(s) Oral every 6 hours PRN Mild Pain (1 - 3)  HYDROmorphone  Injectable 0.5 milliGRAM(s) IV Push every 4 hours PRN Pain  melatonin 3 milliGRAM(s) Oral at bedtime PRN Insomnia  ondansetron Injectable 4 milliGRAM(s) IV Push every 8 hours PRN Nausea and/or Vomiting    Respiratory Medications  ipratropium    for Nebulization 500 MICROGram(s) Nebulizer every 6 hours PRN Bronchospasm    Cardiovascular Medications  furosemide    Tablet 20 milliGRAM(s) Oral <User Schedule>  sildenafil (REVATIO) 20 milliGRAM(s) Oral <User Schedule>  spironolactone 25 milliGRAM(s) Oral daily    Gastrointestinal Medications  aluminum hydroxide/magnesium hydroxide/simethicone Suspension 30 milliLiter(s) Oral every 4 hours PRN Dyspepsia  cyanocobalamin 1000 MICROGram(s) Oral daily  pantoprazole    Tablet 40 milliGRAM(s) Oral before breakfast  polyethylene glycol 3350 17 Gram(s) Oral every 12 hours  senna 2 Tablet(s) Oral at bedtime  sodium chloride 0.9% lock flush 10 milliLiter(s) IV Push every 1 hour PRN Pre/post blood products, medications, blood draw, and to maintain line patency    Genitourinary Medications    Hematologic/Oncologic Medications    Antimicrobial/Immunologic Medications  ceFAZolin   IVPB 2000 milliGRAM(s) IV Intermittent every 8 hours  ceFAZolin   IVPB        Endocrine/Metabolic Medications  predniSONE   Tablet 10 milliGRAM(s) Oral daily    Topical/Other Medications  artificial  tears Solution 1 Drop(s) Both EYES three times a day PRN Dry Eyes  chlorhexidine 4% Liquid 1 Application(s) Topical <User Schedule>  lidocaine 1% Injectable 50 milliLiter(s) Local Injection once  Remodulin (Treprostinil) 5mG/mL 1 Vial(s) 1 Vial(s) SubCutaneous Continuous Pump  sodium chloride 0.65% Nasal 1 Spray(s) Both Nostrils every 6 hours PRN Nasal Congestion    --------------------------------------------------------------------------------------    VITAL SIGNS, INS/OUTS (last 24 hours):  --------------------------------------------------------------------------------------  ICU Vital Signs Last 24 Hrs  T(C): 36.7 (09 Sep 2022 04:51), Max: 36.7 (08 Sep 2022 16:03)  T(F): 98.1 (09 Sep 2022 04:51), Max: 98.1 (09 Sep 2022 04:51)  HR: 92 (09 Sep 2022 06:55) (89 - 102)  BP: 100/60 (09 Sep 2022 06:55) (95/61 - 108/68)  BP(mean): --  ABP: --  ABP(mean): --  RR: 18 (09 Sep 2022 04:51) (18 - 20)  SpO2: 100% (09 Sep 2022 04:51) (97% - 100%)    O2 Parameters below as of 09 Sep 2022 04:51  Patient On (Oxygen Delivery Method): nasal cannula  O2 Flow (L/min): 2        I&O's Summary    08 Sep 2022 07:01  -  09 Sep 2022 07:00  --------------------------------------------------------  IN: 450 mL / OUT: 1500 mL / NET: -1050 mL    09 Sep 2022 07:01  -  09 Sep 2022 10:20  --------------------------------------------------------  IN: 350 mL / OUT: 0 mL / NET: 350 mL      --------------------------------------------------------------------------------------    EXAM:  General/Neuro  Exam: Normal, NAD, alert, oriented x 3, no focal deficits.     Respiratory  Exam: Lungs clear auscultation, Normal expansion/effort.         Cardiovascular  Exam: S1, S2.  Regular rate and rhythm.    GI  Exam: Abdomen soft, Non-tender, Non-distended.      Tubes/Lines/Drains    [x] Peripheral IV  [] Central Venous Line     	[] R	[] L	[] IJ	[] Fem	[] SC        Type:	    Date Placed:   [] Arterial Line		[] R	[] L	[] Fem	[] Rad	[] Ax	Date Placed:   [] PICC:         	[] Midline		[] Mediport           [] Urinary Catheter		    Extremities  Exam: Extremities warm, pink, well-perfused.      Derm:  Exam: Good skin turgor, no skin breakdown.      :   Exam: prima fit    LABS  --------------------------------------------------------------------------------------  Labs:  CAPILLARY BLOOD GLUCOSE                              11.6   13.73 )-----------( 292      ( 08 Sep 2022 04:26 )             37.1         09-08    133<L>  |  97  |  11  ----------------------------<  89  4.1   |  23  |  0.88          LFTs:             6.9  | 0.8  | 23       ------------------[60      ( 08 Sep 2022 04:26 )  3.6  | x    | 79          Lipase:x      Amylase:x         Blood Gas Venous - Lactate: 0.7 mmol/L (09-07-22 @ 02:25)  Blood Gas Venous - Lactate: 1.0 mmol/L (09-07-22 @ 00:05)    ABG - ( 04 Sep 2022 23:03 )  pH: 7.52  /  pCO2: 36    /  pO2: 82    / HCO3: 29    / Base Excess: 6.3   /  SaO2: 97.1            ABG - ( 04 Sep 2022 12:33 )  pH: 7.51  /  pCO2: 34    /  pO2: 79    / HCO3: 27    / Base Excess: 4.2   /  SaO2: 97.9            ABG - ( 03 Sep 2022 12:29 )  pH: 7.50  /  pCO2: 39    /  pO2: 113   / HCO3: 30    / Base Excess: 6.7   /  SaO2: 99.5              Coags:     x      ----< x       ( 08 Sep 2022 04:26 )     36.1            Serum Pro-Brain Natriuretic Peptide: 71 pg/mL (09-02-22 @ 00:22)          Culture - Blood (collected 07 Sep 2022 17:37)  Source: .Blood Blood-Venous  Preliminary Report (08 Sep 2022 23:01):    No growth to date.    Culture - Blood (collected 07 Sep 2022 17:25)  Source: .Blood Blood-Venous  Preliminary Report (08 Sep 2022 23:01):    No growth to date.        --------------------------------------------------------------------------------------    OTHER LABS    IMAGING RESULTS       42 yr old female with PMHx of PulmHTN class I/VI (dx 2014) on continuous Treprostinil (Remodulin) (sildenafil - held initially) c/b Rt heart failure s/p PPM (Dual chamber 2016), Chronic P.E. on rivaroxaban (dx 2017), SVT s/p ablation (5/2020), Asthma (chronic steroid use - prednisone), Fe deficiency anemia admitted on 8/28 for palpitations accompanied by orthopnea/N/V/RUQ abd pain over a 2 day period.  Hospital course c/b hypotension, EDUOARD, transaminitis due to RV sided heart failure requiring Marcela and inotropic support, found to have RA thrombus started on AC, s/p directed thrombus aspiration 8/31/22, c/b Left common femoral artery pseudoaneurysm s/p thrombin injection on 9/3. Consulted for lung transplant evaluation.    Patient seen, evaluated, and examined at bedside with Dr. Wsie. Patient on 2L NC, placed on room air with saturations 95%.  States she has pain at her left leg from pseudoaneurysm, taking dilaudid for pain,     PAST MEDICAL & SURGICAL HISTORY:  Tachycardia  Anemia  Pulmonary hypertension  Pulmonary embolism  Asthma  On home O2 nightly at 2L via NC  PE (pulmonary thromboembolism)  on Xarelto 10 mg daily  Sinusitis  Corneal disorder  Right heart failure  CAD (coronary artery disease)  H/O pulmonary hypertension  Pulmonary embolism  Asthma  History of tachycardia    On supplemental oxygen by nasal cannula  O2 @ 2L      Pacemaker  Skin mass  left upper thigh mass  History of tubal ligation  Pacemaker  H/O tubal ligation    History of pacemaker  12/19 - [employer] model Ingevity 4469    Home Meds: Home Medications:  Atrovent 500 mcg/2.5 mL inhalation solution: 2.5 milliliter(s) inhaled , As Needed (29 Aug 2022 12:31)  Atrovent HFA 17 mcg/inh inhalation aerosol: puff(s) inhaled , As Needed (29 Aug 2022 12:31)  Lasix 20 mg oral tablet: 1 tab(s) orally every other day (29 Aug 2022 12:31)  omeprazole 40 mg oral delayed release capsule: 1 cap(s) orally once a day (29 Aug 2022 12:31)  predniSONE: 10 milligram(s) orally once a day (29 Aug 2022 12:31)  Remodulin 5 mg/mL injectable solution: patient is taking 42ng/kg/min via her own SQ PUMP (29 Aug 2022 12:31)  sildenafil 20 mg oral tablet: 0.5 tab(s) orally once a day (29 Aug 2022 12:31)  Vitamin B12 1000 mcg oral tablet: 1 tab(s) orally once a day (29 Aug 2022 12:31)  Xarelto 10 mg oral tablet: 1 tab(s) orally once a day (29 Aug 2022 12:31)    Allergies: Allergies    adhesives (Rash)  penicillin (Rash)  vancomycin (Rash)    albuterol (Other; Rash)      ROS:    REVIEW OF SYSTEMS    [xx ] A ten-point review of systems was otherwise negative except as noted.    CURRENT MEDICATIONS:   --------------------------------------------------------------------------------------  Neurologic Medications  acetaminophen     Tablet .. 650 milliGRAM(s) Oral every 6 hours PRN Mild Pain (1 - 3)  HYDROmorphone  Injectable 0.5 milliGRAM(s) IV Push every 4 hours PRN Pain  melatonin 3 milliGRAM(s) Oral at bedtime PRN Insomnia  ondansetron Injectable 4 milliGRAM(s) IV Push every 8 hours PRN Nausea and/or Vomiting    Respiratory Medications  ipratropium    for Nebulization 500 MICROGram(s) Nebulizer every 6 hours PRN Bronchospasm    Cardiovascular Medications  furosemide Tablet 20 milliGRAM(s) Oral <User Schedule>  sildenafil (REVATIO) 20 milliGRAM(s) Oral <User Schedule>  spironolactone 25 milliGRAM(s) Oral daily    Gastrointestinal Medications  aluminum hydroxide/magnesium hydroxide/simethicone Suspension 30 milliLiter(s) Oral every 4 hours PRN Dyspepsia  cyanocobalamin 1000 MICROGram(s) Oral daily  pantoprazole    Tablet 40 milliGRAM(s) Oral before breakfast  polyethylene glycol 3350 17 Gram(s) Oral every 12 hours  senna 2 Tablet(s) Oral at bedtime  sodium chloride 0.9% lock flush 10 milliLiter(s) IV Push every 1 hour PRN Pre/post blood products, medications, blood draw, and to maintain line patency    Antimicrobial/Immunologic Medications  ceFAZolin   IVPB 2000 milliGRAM(s) IV Intermittent every 8 hours  ceFAZolin   IVPB        Endocrine/Metabolic Medications  predniSONE   Tablet 10 milliGRAM(s) Oral daily    Topical/Other Medications  artificial  tears Solution 1 Drop(s) Both EYES three times a day PRN Dry Eyes  chlorhexidine 4% Liquid 1 Application(s) Topical <User Schedule>  lidocaine 1% Injectable 50 milliLiter(s) Local Injection once  Remodulin (Treprostinil) 5mG/mL 1 Vial(s) 1 Vial(s) SubCutaneous Continuous Pump  sodium chloride 0.65% Nasal 1 Spray(s) Both Nostrils every 6 hours PRN Nasal Congestion    --------------------------------------------------------------------------------------      EXAM:  General:  2L, in NAD  Chest: appears normal  Cardiac: RRR, S1S2 normal  Respiratory: ctab, no wheezing/rales/rhonchi  Gastrointestinal: overweight, soft, non-distended, non-tender, +BS, +port on left, +old port incision on right with guaze dressing c/d/i, no erythema or drainage   Extremities: warm and well perfused, trace peripheral edema, left groin/leg tenderness upon palpation  --------------------------------------------------------------------------------------    OTHER LABS    IMAGING RESULTS

## 2022-09-12 NOTE — PROGRESS NOTE ADULT - PROBLEM SELECTOR PLAN 4
- hematoma of left groin, no evidence of pseudoaneurysm.  - Left groin hematoma expanding in size, and new superficial hematoma measuring 3.7x 5.7 x 1.9 cm.   - Per vascular no indication for intervention at this time as no signs of infection or nerve palsies present.   - f/u vascular surgery recommendations  - No evidence of abscess present of the left lower extremity hematoma  - Will monitor H/H - hematoma of left groin, no evidence of pseudoaneurysm.  - Left groin hematoma expanding in size, and new superficial hematoma measuring 3.7x 5.7 x 1.9 cm.   - Per vascular no indication for intervention at this time as no signs of infection or nerve palsies present.   - f/u vascular surgery recommendations  - No evidence of abscess present of the left lower extremity hematoma  - Will monitor H/H  - Repeat US of Left groin hematoma, stable and unchanged (9/12/22)

## 2022-09-12 NOTE — PROGRESS NOTE ADULT - ATTENDING COMMENTS
43 y/o obese F w/ILD, prior PE, and pulmonary hypertension (likely group I + possible group IV) c/b cor pulmonale on home Remodulin presented with hypotension likely secondary to acute on chronic RV failure, possible clot in transit s/p attempt at thrombectomy which was aborted- c/b psueoaneurysm s/p thrombin injection. EDOUARD likely secondary to venous congestion- resolved.     Staph lugdunensis bacteremia- continue Ancef. Subcutaneous abscess secondary to device- I&D growing MRSA- on Doxycycline.     High risk for MYRIAM- deferred.     Worsening groin hematoma- heparin held initially, restarted 9/9 as found to have R cephalic v thrombosis. Hematoma stable on u/s today- transition to Xarelto.     D/c planning if continues to improve.

## 2022-09-12 NOTE — PROGRESS NOTE ADULT - ASSESSMENT
42F PMHx of pulmonary HTN (group I and group IV; on Treprostinil and Xarelto), home o2 (2L), ILD, JULIA, obesity, and PPM presents on  for pain, nausea and vomiting, found to be in profound RV failure s/p dobutamine, Marcela, and aggressive diuresis. Patient also noted to have RA clot in transit on TTE s/p mechanical thrombectomy which was complicated by left groin pseudoaneurysm s/p thrombin injection. Course complicated by EDOUARD likely cardiorenal which has improved.    TTE done which showed hyperdynamic and compressed LV, severe RV dysfunction, mod TR, dilated IVC. Repeat TTE with improved RV function however RA thrombus noted.   CTA:  marked enlargement of PA. No PE. 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    #Group I and IV pulmonary hypertension  #RV failure  #RA thrombus  #RUE radial and cephalic vein thrombi.    Recommendations  -CW Sildenafil 20 mg po tid.   -CW Remodulin SQ pump at 42 ng/kg/min     -CW chronic prednisone at 10 mg po daily.   -CW ipratropium prn.   -Diuretic mgmt per HF. On lasix 20 mg po QOD.    -Being evaluated by transplant team.   -Aggressive physical therapy. PT/OT consult. PM&R. Out of bed to chair daily.   -Optimize nutrition. Consult nutrition.   -Vascular surgery following for left groin hematomas; serial groin exams; keep type and screen current; serial CBC.  -Cleared by vascular surgery to utilize heparin drip.   -Risk outweight benefit of MYRIMA w/ general anesthesia at this time for Staph Lugdunensus bacteremia. On Cefazolin.   -Also with MRSA abscess from prior port site. On doxy.   -All teams contribution to care greatly appreciated    Patient should f/u with pulmonary, Dr. Yoon (pulmonary HTN) within 1-2 weeks of discharge. Please email [email or web address] and include patient's name,  and MRN and allow 24 hours for scheduling.    [address] [place] [address]  [phone]     Dr. Ramesh Echevarria, DO  Pulmonary and Critical Care Medicine Fellow   Available via [employer] Teams - **Preferred**  Pulmonary [facility] #[id number] ([facility]) / [id number] ([facility])  Pager:  [phone]

## 2022-09-12 NOTE — PROGRESS NOTE ADULT - SUBJECTIVE AND OBJECTIVE BOX
PROGRESS NOTE:   Authored by Alfonso Simons MD   Patient is a 42y old  Female who presents with a chief complaint of Palpitations (11 Sep 2022 15:41)      SUBJECTIVE / OVERNIGHT EVENTS: No acute events overnight. No chest pain,  palpitations, dyspnea, abdominal pain. nausea, vomiting, diarrhea, blurry vision, dysuria, lightheadedness, dizziness.    ADDITIONAL REVIEW OF SYSTEMS:    MEDICATIONS  (STANDING):  ceFAZolin   IVPB 2000 milliGRAM(s) IV Intermittent every 8 hours  ceFAZolin   IVPB      chlorhexidine 4% Liquid 1 Application(s) Topical <User Schedule>  cyanocobalamin 1000 MICROGram(s) Oral daily  doxycycline monohydrate Capsule 100 milliGRAM(s) Oral every 12 hours  furosemide    Tablet 20 milliGRAM(s) Oral <User Schedule>  heparin  Infusion. 1400 Unit(s)/Hr (14 mL/Hr) IV Continuous <Continuous>  lidocaine 1% Injectable 50 milliLiter(s) Local Injection once  multivitamin 1 Tablet(s) Oral daily  pantoprazole    Tablet 40 milliGRAM(s) Oral before breakfast  polyethylene glycol 3350 17 Gram(s) Oral every 12 hours  predniSONE   Tablet 10 milliGRAM(s) Oral daily  Remodulin (Treprostinil) 5mG/mL 1 Vial(s) 1 Vial(s) SubCutaneous Continuous Pump  senna 2 Tablet(s) Oral at bedtime  sildenafil (REVATIO) 20 milliGRAM(s) Oral <User Schedule>  spironolactone 25 milliGRAM(s) Oral daily    MEDICATIONS  (PRN):  acetaminophen     Tablet .. 650 milliGRAM(s) Oral every 6 hours PRN Mild Pain (1 - 3)  aluminum hydroxide/magnesium hydroxide/simethicone Suspension 30 milliLiter(s) Oral every 4 hours PRN Dyspepsia  artificial  tears Solution 1 Drop(s) Both EYES three times a day PRN Dry Eyes  heparin   Injectable 7500 Unit(s) IV Push every 6 hours PRN For aPTT less than 40  heparin   Injectable 3500 Unit(s) IV Push every 6 hours PRN For aPTT between 40 - 57  HYDROmorphone  Injectable 0.5 milliGRAM(s) IV Push every 4 hours PRN Pain  ipratropium    for Nebulization 500 MICROGram(s) Nebulizer every 6 hours PRN Bronchospasm  melatonin 3 milliGRAM(s) Oral at bedtime PRN Insomnia  ondansetron Injectable 4 milliGRAM(s) IV Push every 8 hours PRN Nausea and/or Vomiting  sodium chloride 0.65% Nasal 1 Spray(s) Both Nostrils every 6 hours PRN Nasal Congestion  sodium chloride 0.9% lock flush 10 milliLiter(s) IV Push every 1 hour PRN Pre/post blood products, medications, blood draw, and to maintain line patency      CAPILLARY BLOOD GLUCOSE        I&O's Summary    10 Sep 2022 07:01  -  11 Sep 2022 07:00  --------------------------------------------------------  IN: 858 mL / OUT: 1900 mL / NET: -1042 mL    11 Sep 2022 07:01  -  12 Sep 2022 06:51  --------------------------------------------------------  IN: 1056 mL / OUT: 2700 mL / NET: -1644 mL        PHYSICAL EXAM:  Vital Signs Last 24 Hrs  T(C): 36.7 (12 Sep 2022 06:10), Max: 37.1 (11 Sep 2022 08:41)  T(F): 98.1 (12 Sep 2022 06:10), Max: 98.7 (11 Sep 2022 08:41)  HR: 96 (12 Sep 2022 06:10) (87 - 117)  BP: 109/75 (12 Sep 2022 06:10) (104/68 - 145/110)  BP(mean): --  RR: 18 (12 Sep 2022 06:10) (18 - 18)  SpO2: 96% (12 Sep 2022 06:10) (96% - 98%)    Parameters below as of 12 Sep 2022 06:10  Patient On (Oxygen Delivery Method): room air        PHYSICAL EXAM:  GENERAL: No apparent distress.   HEAD:  Atraumatic, Normocephalic  EYES: EOMI, PERRLA, conjunctiva and sclera clear  NECK: Supple, no lymphadenopathy, no JVD  CHEST/LUNG: Clear to auscultation bilateral and symmetric; No wheezes, rales, or rhonchi  HEART: S1 and S2 normal. Regular rate and rhythm; No murmurs, rubs, or gallops  ABDOMEN: Soft, non-tender, non-distended; normal bowel sounds. Clean, dry dressing on rt anterior abdominal wall.  EXTREMITIES: LLE hematoma near groin, TTP. 2+ peripheral pulses b/l, No clubbing, cyanosis, or edema  NEUROLOGY: A&O x 3, no focal deficits  SKIN: No rashes or lesions      LABS:                        10.8   13.99 )-----------( 217      ( 11 Sep 2022 10:56 )             35.2     09-11    133<L>  |  100  |  13  ----------------------------<  116<H>  5.1   |  25  |  0.88    Ca    8.8      11 Sep 2022 10:56  Phos  3.0     09-11  Mg     1.9     09-11    TPro  6.7  /  Alb  3.6  /  TBili  0.5  /  DBili  x   /  AST  21  /  ALT  29  /  AlkPhos  57  09-11    PTT - ( 11 Sep 2022 22:56 )  PTT:64.4 sec            RADIOLOGY & ADDITIONAL TESTS:  Lab Results Reviewed   Imaging Reviewed  Electrocardiogram Reviewed   PROGRESS NOTE:   Authored by Alfonso Simons MD   Patient is a 42y old  Female who presents with a chief complaint of Palpitations (11 Sep 2022 15:41)      SUBJECTIVE / OVERNIGHT EVENTS: No acute events overnight. Feels better, left leg pain improved.  No chest pain,  palpitations, dyspnea, abdominal pain. nausea, vomiting, diarrhea, blurry vision, dysuria, lightheadedness, dizziness.    ADDITIONAL REVIEW OF SYSTEMS:    MEDICATIONS  (STANDING):  ceFAZolin   IVPB 2000 milliGRAM(s) IV Intermittent every 8 hours  ceFAZolin   IVPB      chlorhexidine 4% Liquid 1 Application(s) Topical <User Schedule>  cyanocobalamin 1000 MICROGram(s) Oral daily  doxycycline monohydrate Capsule 100 milliGRAM(s) Oral every 12 hours  furosemide    Tablet 20 milliGRAM(s) Oral <User Schedule>  heparin  Infusion. 1400 Unit(s)/Hr (14 mL/Hr) IV Continuous <Continuous>  lidocaine 1% Injectable 50 milliLiter(s) Local Injection once  multivitamin 1 Tablet(s) Oral daily  pantoprazole    Tablet 40 milliGRAM(s) Oral before breakfast  polyethylene glycol 3350 17 Gram(s) Oral every 12 hours  predniSONE   Tablet 10 milliGRAM(s) Oral daily  Remodulin (Treprostinil) 5mG/mL 1 Vial(s) 1 Vial(s) SubCutaneous Continuous Pump  senna 2 Tablet(s) Oral at bedtime  sildenafil (REVATIO) 20 milliGRAM(s) Oral <User Schedule>  spironolactone 25 milliGRAM(s) Oral daily    MEDICATIONS  (PRN):  acetaminophen     Tablet .. 650 milliGRAM(s) Oral every 6 hours PRN Mild Pain (1 - 3)  aluminum hydroxide/magnesium hydroxide/simethicone Suspension 30 milliLiter(s) Oral every 4 hours PRN Dyspepsia  artificial  tears Solution 1 Drop(s) Both EYES three times a day PRN Dry Eyes  heparin   Injectable 7500 Unit(s) IV Push every 6 hours PRN For aPTT less than 40  heparin   Injectable 3500 Unit(s) IV Push every 6 hours PRN For aPTT between 40 - 57  HYDROmorphone  Injectable 0.5 milliGRAM(s) IV Push every 4 hours PRN Pain  ipratropium    for Nebulization 500 MICROGram(s) Nebulizer every 6 hours PRN Bronchospasm  melatonin 3 milliGRAM(s) Oral at bedtime PRN Insomnia  ondansetron Injectable 4 milliGRAM(s) IV Push every 8 hours PRN Nausea and/or Vomiting  sodium chloride 0.65% Nasal 1 Spray(s) Both Nostrils every 6 hours PRN Nasal Congestion  sodium chloride 0.9% lock flush 10 milliLiter(s) IV Push every 1 hour PRN Pre/post blood products, medications, blood draw, and to maintain line patency      CAPILLARY BLOOD GLUCOSE        I&O's Summary    10 Sep 2022 07:01  -  11 Sep 2022 07:00  --------------------------------------------------------  IN: 858 mL / OUT: 1900 mL / NET: -1042 mL    11 Sep 2022 07:01  -  12 Sep 2022 06:51  --------------------------------------------------------  IN: 1056 mL / OUT: 2700 mL / NET: -1644 mL        PHYSICAL EXAM:  Vital Signs Last 24 Hrs  T(C): 36.7 (12 Sep 2022 06:10), Max: 37.1 (11 Sep 2022 08:41)  T(F): 98.1 (12 Sep 2022 06:10), Max: 98.7 (11 Sep 2022 08:41)  HR: 96 (12 Sep 2022 06:10) (87 - 117)  BP: 109/75 (12 Sep 2022 06:10) (104/68 - 145/110)  BP(mean): --  RR: 18 (12 Sep 2022 06:10) (18 - 18)  SpO2: 96% (12 Sep 2022 06:10) (96% - 98%)    Parameters below as of 12 Sep 2022 06:10  Patient On (Oxygen Delivery Method): room air        PHYSICAL EXAM:  GENERAL: No apparent distress.   HEAD:  Atraumatic, Normocephalic  EYES: EOMI, PERRLA, conjunctiva and sclera clear  NECK: Supple, no lymphadenopathy, no JVD  CHEST/LUNG: Clear to auscultation bilateral and symmetric; No wheezes, rales, or rhonchi  HEART: S1 and S2 normal. Regular rate and rhythm; No murmurs, rubs, or gallops  ABDOMEN: Soft, non-tender, non-distended; normal bowel sounds. Clean, dry dressing on rt anterior abdominal wall.  EXTREMITIES: LLE hematoma near groin appears improved, soft, TTP. 2+ peripheral pulses b/l, No clubbing, cyanosis, or edema  NEUROLOGY: A&O x 3, no focal deficits  SKIN: No rashes or lesions      LABS:                        10.8   13.99 )-----------( 217      ( 11 Sep 2022 10:56 )             35.2     09-11    133<L>  |  100  |  13  ----------------------------<  116<H>  5.1   |  25  |  0.88    Ca    8.8      11 Sep 2022 10:56  Phos  3.0     09-11  Mg     1.9     09-11    TPro  6.7  /  Alb  3.6  /  TBili  0.5  /  DBili  x   /  AST  21  /  ALT  29  /  AlkPhos  57  09-11    PTT - ( 11 Sep 2022 22:56 )  PTT:64.4 sec            RADIOLOGY & ADDITIONAL TESTS:  Lab Results Reviewed   Imaging Reviewed  Electrocardiogram Reviewed

## 2022-09-12 NOTE — PROGRESS NOTE ADULT - SUBJECTIVE AND OBJECTIVE BOX
PULMONARY SERVICE FOLLOW UP CONSULT NOTE    SUBJECTIVE:  Denies chest pain, dyspnea, cough, or wheezing.  No acute complaints.     REVIEW OF SYSTEMS:  All additional ROS negative.    MEDICATIONS:  Pulmonary:  ipratropium    for Nebulization 500 MICROGram(s) Nebulizer every 6 hours PRN    Antimicrobials:  ceFAZolin   IVPB 2000 milliGRAM(s) IV Intermittent every 8 hours  ceFAZolin   IVPB      doxycycline monohydrate Capsule 100 milliGRAM(s) Oral every 12 hours    Anticoagulants:  heparin   Injectable 7500 Unit(s) IV Push every 6 hours PRN  heparin   Injectable 3500 Unit(s) IV Push every 6 hours PRN  heparin  Infusion. 1400 Unit(s)/Hr IV Continuous <Continuous>    Onc:    GI/:  aluminum hydroxide/magnesium hydroxide/simethicone Suspension 30 milliLiter(s) Oral every 4 hours PRN  pantoprazole    Tablet 40 milliGRAM(s) Oral before breakfast  polyethylene glycol 3350 17 Gram(s) Oral every 12 hours  senna 2 Tablet(s) Oral at bedtime    Endocrine:  predniSONE   Tablet 10 milliGRAM(s) Oral daily    Cardiac:  furosemide    Tablet 20 milliGRAM(s) Oral <User Schedule>  sildenafil (REVATIO) 20 milliGRAM(s) Oral <User Schedule>  spironolactone 25 milliGRAM(s) Oral daily    Other Medications:  acetaminophen     Tablet .. 650 milliGRAM(s) Oral every 6 hours PRN  artificial  tears Solution 1 Drop(s) Both EYES three times a day PRN  chlorhexidine 4% Liquid 1 Application(s) Topical <User Schedule>  cyanocobalamin 1000 MICROGram(s) Oral daily  HYDROmorphone  Injectable 0.5 milliGRAM(s) IV Push every 4 hours PRN  lidocaine 1% Injectable 50 milliLiter(s) Local Injection once  melatonin 3 milliGRAM(s) Oral at bedtime PRN  multivitamin 1 Tablet(s) Oral daily  ondansetron Injectable 4 milliGRAM(s) IV Push every 8 hours PRN  Remodulin (Treprostinil) 5mG/mL 1 Vial(s) 1 Vial(s) SubCutaneous Continuous Pump  sodium chloride 0.65% Nasal 1 Spray(s) Both Nostrils every 6 hours PRN  sodium chloride 0.9% lock flush 10 milliLiter(s) IV Push every 1 hour PRN      PHYSICAL EXAM  Vital Signs Last 24 Hrs  T(C): 36.7 (12 Sep 2022 06:10), Max: 37.1 (11 Sep 2022 08:41)  T(F): 98.1 (12 Sep 2022 06:10), Max: 98.7 (11 Sep 2022 08:41)  HR: 96 (12 Sep 2022 06:10) (87 - 117)  BP: 109/75 (12 Sep 2022 06:10) (104/68 - 145/110)  BP(mean): --  RR: 18 (12 Sep 2022 06:10) (18 - 18)  SpO2: 96% (12 Sep 2022 06:10) (96% - 98%)    Parameters below as of 12 Sep 2022 06:10  Patient On (Oxygen Delivery Method): room air        09-11 @ 07:01  -  09-12 @ 07:00  --------------------------------------------------------  IN: 1056 mL / OUT: 2700 mL / NET: -1644 mL            CONSTITUTIONAL: No acute distress.   HEENT:  Conjunctiva clear B/L.  Moist oral mucosa.   Cardiovascular: RRR with no murmurs. No JVD noted. No lower extremity edema B/L. Extremities are warm and well perfused.    Respiratory: Lungs CTAB. No wrr. No accessory muscle use.   Gastrointestinal:  Soft, nontender. Non-distended. Non-rigid.    Neurologic:  Alert and awake. Moving all extremities. Following commands.    Skin:  No gross rashes notes.    LABS:      CBC Full  -  ( 11 Sep 2022 10:56 )  WBC Count : 13.99 K/uL  RBC Count : 4.80 M/uL  Hemoglobin : 10.8 g/dL  Hematocrit : 35.2 %  Platelet Count - Automated : 217 K/uL  Mean Cell Volume : 73.3 fl  Mean Cell Hemoglobin : 22.5 pg  Mean Cell Hemoglobin Concentration : 30.7 gm/dL  Auto Neutrophil # : x  Auto Lymphocyte # : x  Auto Monocyte # : x  Auto Eosinophil # : x  Auto Basophil # : x  Auto Neutrophil % : x  Auto Lymphocyte % : x  Auto Monocyte % : x  Auto Eosinophil % : x  Auto Basophil % : x    09-11    133<L>  |  100  |  13  ----------------------------<  116<H>  5.1   |  25  |  0.88    Ca    8.8      11 Sep 2022 10:56  Phos  3.0     09-11  Mg     1.9     09-11    TPro  6.7  /  Alb  3.6  /  TBili  0.5  /  DBili  x   /  AST  21  /  ALT  29  /  AlkPhos  57  09-11    PTT - ( 11 Sep 2022 22:56 )  PTT:64.4 sec                  RADIOLOGY & ADDITIONAL STUDIES: PULMONARY SERVICE FOLLOW UP CONSULT NOTE    SUBJECTIVE:  Denies chest pain, dyspnea, cough, or wheezing.  No acute complaints.     REVIEW OF SYSTEMS:  All additional ROS negative.    MEDICATIONS:  Pulmonary:  ipratropium    for Nebulization 500 MICROGram(s) Nebulizer every 6 hours PRN    Antimicrobials:  ceFAZolin   IVPB 2000 milliGRAM(s) IV Intermittent every 8 hours  ceFAZolin   IVPB      doxycycline monohydrate Capsule 100 milliGRAM(s) Oral every 12 hours    Anticoagulants:  heparin   Injectable 7500 Unit(s) IV Push every 6 hours PRN  heparin   Injectable 3500 Unit(s) IV Push every 6 hours PRN  heparin  Infusion. 1400 Unit(s)/Hr IV Continuous <Continuous>    Onc:    GI/:  aluminum hydroxide/magnesium hydroxide/simethicone Suspension 30 milliLiter(s) Oral every 4 hours PRN  pantoprazole    Tablet 40 milliGRAM(s) Oral before breakfast  polyethylene glycol 3350 17 Gram(s) Oral every 12 hours  senna 2 Tablet(s) Oral at bedtime    Endocrine:  predniSONE   Tablet 10 milliGRAM(s) Oral daily    Cardiac:  furosemide    Tablet 20 milliGRAM(s) Oral <User Schedule>  sildenafil (REVATIO) 20 milliGRAM(s) Oral <User Schedule>  spironolactone 25 milliGRAM(s) Oral daily    Other Medications:  acetaminophen     Tablet .. 650 milliGRAM(s) Oral every 6 hours PRN  artificial  tears Solution 1 Drop(s) Both EYES three times a day PRN  chlorhexidine 4% Liquid 1 Application(s) Topical <User Schedule>  cyanocobalamin 1000 MICROGram(s) Oral daily  HYDROmorphone  Injectable 0.5 milliGRAM(s) IV Push every 4 hours PRN  lidocaine 1% Injectable 50 milliLiter(s) Local Injection once  melatonin 3 milliGRAM(s) Oral at bedtime PRN  multivitamin 1 Tablet(s) Oral daily  ondansetron Injectable 4 milliGRAM(s) IV Push every 8 hours PRN  Remodulin (Treprostinil) 5mG/mL 1 Vial(s) 1 Vial(s) SubCutaneous Continuous Pump  sodium chloride 0.65% Nasal 1 Spray(s) Both Nostrils every 6 hours PRN  sodium chloride 0.9% lock flush 10 milliLiter(s) IV Push every 1 hour PRN      PHYSICAL EXAM  Vital Signs Last 24 Hrs  T(C): 36.7 (12 Sep 2022 06:10), Max: 37.1 (11 Sep 2022 08:41)  T(F): 98.1 (12 Sep 2022 06:10), Max: 98.7 (11 Sep 2022 08:41)  HR: 96 (12 Sep 2022 06:10) (87 - 117)  BP: 109/75 (12 Sep 2022 06:10) (104/68 - 145/110)  BP(mean): --  RR: 18 (12 Sep 2022 06:10) (18 - 18)  SpO2: 96% (12 Sep 2022 06:10) (96% - 98%)    Parameters below as of 12 Sep 2022 06:10  Patient On (Oxygen Delivery Method): room air        09-11 @ 07:01  -  09-12 @ 07:00  --------------------------------------------------------  IN: 1056 mL / OUT: 2700 mL / NET: -1644 mL            CONSTITUTIONAL: No acute distress.   HEENT:  Conjunctiva clear B/L.  Moist oral mucosa.   Cardiovascular: RRR with no murmurs. No JVD noted. No lower extremity edema B/L. Extremities are warm and well perfused.    Respiratory: Dec bs. No accessory muscle use.   Gastrointestinal:  Soft, nontender. Non-distended. Non-rigid.    Neurologic:  Alert and awake. Moving all extremities. Following commands.    Skin:  Dressing over abdomen.     LABS:      CBC Full  -  ( 11 Sep 2022 10:56 )  WBC Count : 13.99 K/uL  RBC Count : 4.80 M/uL  Hemoglobin : 10.8 g/dL  Hematocrit : 35.2 %  Platelet Count - Automated : 217 K/uL  Mean Cell Volume : 73.3 fl  Mean Cell Hemoglobin : 22.5 pg  Mean Cell Hemoglobin Concentration : 30.7 gm/dL  Auto Neutrophil # : x  Auto Lymphocyte # : x  Auto Monocyte # : x  Auto Eosinophil # : x  Auto Basophil # : x  Auto Neutrophil % : x  Auto Lymphocyte % : x  Auto Monocyte % : x  Auto Eosinophil % : x  Auto Basophil % : x    09-11    133<L>  |  100  |  13  ----------------------------<  116<H>  5.1   |  25  |  0.88    Ca    8.8      11 Sep 2022 10:56  Phos  3.0     09-11  Mg     1.9     09-11    TPro  6.7  /  Alb  3.6  /  TBili  0.5  /  DBili  x   /  AST  21  /  ALT  29  /  AlkPhos  57  09-11    PTT - ( 11 Sep 2022 22:56 )  PTT:64.4 sec                  RADIOLOGY & ADDITIONAL STUDIES:

## 2022-09-12 NOTE — PROGRESS NOTE ADULT - ASSESSMENT
42 yr old female with stated hx significant for PulmHTN class I/VI on Treprostinil therapy, Chronic PE who originally presented with palpitations accompanied by orthopnea/N/V/RUQ abd pain over a 2 day period. Course c/b hypotension secondary to acute on chronic cor pulmonale with end organ dysfx (EDOUARD, transaminitis), and right atrial clot requiring catheter guided thrombectomy, as well as thrombin injection of right femoral pseudoaneurysm.     Patient found to have up-trending white count with high of 16 this AM. One set of blood cultures from 8/29 positive for staph lugdunensis. TTE from 8/29 and 8/31 identified no vegetations. Repeat cultures from 8/31 NGTD. Central venous line and A-line placed on 9/2, after positive culture. Patient with catheter directed thrombectomy on 8/31.   Patient with hx of PPM placed in 2016. Continuous Treprostinil pumped removed from site in RLQ following increased tenderness and swelling approximately 3 days ago, with patient re-implanting infusion pump in LLQ on her own.     Patient with positive culture of Staph Lugdunensis, which is typically pathogenic and not a contaminant, i/s/o worsening sub-q and skin infection with likely underlying abscess from previous Remodulin placement site. Patient empirically treated with course of aztreonam 2000mg BID from 8/29-9/1 out of concern for sepsis contributing to hypotension i/s/o elevated white count, but this would have little coverage for Staph Lugdunensis.       overall bacteremia, cellulitis and abscess, leucocytosis, allergy to multiple abx.   pseudoaneurysm, s/p coiling.   MRSA infection         PLAN:   -s/p drainage of abscess, cx with MRSA, c/w doxy, plan for 10 days total.   - repeat blood cultures NTD   - c/w cefazolin 2 gm iv q8h, plan for 4 weeks, midline in place.   - would hold off on MYRIAM, after discussion with pulm.  - CT lt groin with no superinfection of hematoma  - trend cbc for leucocytosis, stable.     Plan discussed with raine Grover  Please contact through MS Teams   If no response or past 5 pm/weekend call [phone].

## 2022-09-12 NOTE — PROVIDER CONTACT NOTE (CRITICAL VALUE NOTIFICATION) - ACTION/TREATMENT ORDERED:
Provider made aware. FULL ANTICOAGULATION NOMOGRAM followed. Heparin gtt stopped 1 hr and to resume at 13ml/hr.

## 2022-09-12 NOTE — PROGRESS NOTE ADULT - PROBLEM SELECTOR PLAN 2
- Staph lugdunesis 8/29 bcx, coagulase-negative staph treated in a similar manner to staph aureus  - Likely source is abscess of the rt subcutaneous port  - BCx 9/7 - NGTD  - Per anesthesia, MYRIAM is high risk and needs to be done under general anesthesia with intubation. Per pulmonology, patient is high risk given her pulmonary hypertension and is not able to undergo procedure.   - Cefazolin 2g q 8 x 4 week duration  - CT A&P showing no evidence of abscess present in left leg hematoma.   - s/p I&D (9/8/22)  - wound culture - MRSA  - doxycycline 100 mg BID x course to be determined - Staph lugdunesis 8/29 bcx, coagulase-negative staph treated in a similar manner to staph aureus  - wound culture - MRSA  - s/p I&D (9/8/22)  - BCx 9/7 - NGTD  - Per anesthesia, MYRIAM is high risk and needs to be done under general anesthesia with intubation. Per pulmonology, patient is high risk given her pulmonary hypertension and is not able to undergo procedure.   - CT A&P showing no evidence of abscess present in left leg hematoma.   - Cefazolin 2g q 8 x 4 week duration to be completed 10/5/22  - doxycycline 100 mg BID to be completed 9/20/22  - Midline to be removed by 10/6/22

## 2022-09-12 NOTE — PROGRESS NOTE ADULT - ATTENDING COMMENTS
42F with a h/o pulmonary HTN (group I and group IV; on Treprostinil and Xarelto), chronic hypoxic respiratory failure on supplemental oxygen at 2LPM, ILD, JULIA, obesity, and PPM presents on 8/28 for pain, nausea and vomiting, found to be in profound RV failure s/p dobutamine, Marcela, and aggressive diuresis. Patient also noted to have RA clot in transit on TTE s/p mechanical thrombectomy which was complicated by left groin pseudoaneurysm s/p thrombin injection. Course complicated by EDOUARD likely cardiorenal which has improved.    1. RV failure - improved   Initial TTE with hyperdynamic and compressed LV, severe RV dysfunction, mod TR, dilated IVC. Repeat TTE with improved RV function however RA thrombus noted.   CTPA  marked enlargement of PA. No PE. B/L GGOs.   -continue with Sildenafil and Remodulin, diuresis    2. Coag negative staph bacteremia- being treat with Cephazolin, cellulitis/abscess s/p I&D with MRSA - on Doxy    MYRIAM deferred as she is high risk for anesthesia.      3. Left groin hematoma - repeat US today - No pseudoaneurysm in the left groin.Two hematomas noted in the left femoral artery, essentially unchanged in size.    4. Thrombus in the radial vein and cephalic vein.    Resumed Heparin drip    5. Chronic respiratory failure with hypoxia - c/w supplemental oxygen, goal O2 sats >92%    6. EDOUARD- improving    Ambulate as tolerated  Agree with plan as outlined above.     Time-based billing (NON-critical care).     35 minutes spent on total encounter; more than 50% of the visit was spent counseling and / or coordinating care by the attending physician.  The necessity of the time spent during the encounter on this date of service was due to:     Personal review of data, imaging and discussion with patient and medical team.

## 2022-09-12 NOTE — PROGRESS NOTE ADULT - SUBJECTIVE AND OBJECTIVE BOX
42yPatient is a 42y old  Female who presents with a chief complaint of Palpitations (12 Sep 2022 08:45)      Interval history:  Afebrile, pain in groin improving but persist, abdominal pain resolved since I&d       Allergies:   adhesives (Rash)  albuterol (Other; Rash)  penicillin (Rash)  vancomycin (Rash)    Antimicrobials:  ceFAZolin   IVPB 2000 milliGRAM(s) IV Intermittent every 8 hours  doxycycline monohydrate Capsule 100 milliGRAM(s) Oral every 12 hours      REVIEW OF SYSTEMS:  No chest pain   No SOB  No N/V  No rash.       Vital Signs Last 24 Hrs  T(C): 36.6 (09-12-22 @ 17:36), Max: 36.7 (09-11-22 @ 23:20)  T(F): 97.8 (09-12-22 @ 17:36), Max: 98.1 (09-11-22 @ 23:20)  HR: 112 (09-12-22 @ 17:36) (87 - 112)  BP: 120/77 (09-12-22 @ 17:36) (104/68 - 120/77)  BP(mean): --  RR: 18 (09-12-22 @ 17:36) (18 - 18)  SpO2: 96% (09-12-22 @ 17:36) (96% - 99%)      PHYSICAL EXAM:  Pt in no acute distress, alert, awake.   breathing comfortably, not on oxygen, eating.  + PPM   rt sided wound with packing   no phlebitis                            11.0   14.88 )-----------( 367      ( 12 Sep 2022 07:26 )             35.8   09-12    137  |  101  |  12  ----------------------------<  80  4.0   |  25  |  0.89    Ca    8.8      12 Sep 2022 07:27  Phos  4.4     09-12  Mg     1.8     09-12    TPro  6.7  /  Alb  3.4  /  TBili  0.6  /  DBili  x   /  AST  22  /  ALT  26  /  AlkPhos  51  09-12      LIVER FUNCTIONS - ( 12 Sep 2022 07:27 )  Alb: 3.4 g/dL / Pro: 6.7 g/dL / ALK PHOS: 51 U/L / ALT: 26 U/L / AST: 22 U/L / GGT: x             Specimen Source: .Abscess Skin   Culture Results:   Moderate Methicillin Resistant Staphylococcus aureus       Radiology:    < from: US Duplex Arterial Lower Ext Ltd, Left (09.12.22 @ 11:32) >  IMPRESSION:    No pseudoaneurysm in the left groin.  Two hematomas noted in the left femoral artery, essentially unchanged in   size.

## 2022-09-12 NOTE — PROGRESS NOTE ADULT - ASSESSMENT
42 yr old female with PMHx of PulmHTN class I/VI (dx 2014) on continuous Treprostinil (Remodulin) (sildenafil - held initially) c/b Rt heart failure s/p PPM (Dual chamber 2016), Chronic P.E. on rivaroxaban (dx 2017), SVT s/p ablation (5/2020), Asthma (chronic steroid use - prednisone), Fe deficiency anemia admitted on 8/28 for palpitations accompanied by orthopnea/N/V/RUQ abd pain over a 2 day period.  Hospital course c/b hypotension, EDOUARD, transaminitis due to RV sided heart failure requiring Marcela and inotropic support, found to have RA thrombus started on AC, s/p directed thrombus aspiration 8/31/22, c/b Left common femoral artery pseudoaneurysm s/p thormbin injection on 9/3. Patient consulted for lung transplant evaluation.    #pre lung transplant evaluation  -weight 207 following aggressive diuresis, previously seen as outpatient weighed 227lb     --- today standing weight is 208 with BMI 35, discussed target weight of 190lbs to get to BMI 31 and that a BMI of 28 or less is ideal for lung transplant  -spent 20mins educating on lung transplant process and the importance of rehab , patient in agreement and would like to move forward with the process  -noted to have expanding L groin hematoma in setting of AC which is a barrier to working with PT at this time however patient was able to ambulate and perform ADLs prior to transplant and once medically cleared is motivated to work with PT    #PE/dvt  -would recommend heme consult for hypercoagulable w/u    reviewed lung transplant work up from [facility] which majority of imaging was done 11/2020 patient reports reflux symptoms with normal esophagram however if moving forward with evaluation would consider repeating GI work up.    Above discussed with Dr. Wise 42 yr old female with PMHx of PulmHTN class I/VI (dx 2014) on continuous Treprostinil (Remodulin) (sildenafil - held initially) c/b Rt heart failure s/p PPM (Dual chamber 2016), Chronic P.E. on rivaroxaban (dx 2017), SVT s/p ablation (5/2020), Asthma (chronic steroid use - prednisone), Fe deficiency anemia admitted on 8/28 for palpitations accompanied by orthopnea/N/V/RUQ abd pain over a 2 day period.  Hospital course c/b hypotension, EDOUARD, transaminitis due to RV sided heart failure requiring Marcela and inotropic support, found to have RA thrombus started on AC, s/p directed thrombus aspiration 8/31/22, c/b Left common femoral artery pseudoaneurysm s/p thormbin injection on 9/3. Patient consulted for lung transplant evaluation.    #pre lung transplant evaluation  -Overweight, BMI < 30 for lung transplant  -Wean off opiates  -[facility] lung transplant workup in 11/2020 with only barrier being overweight  -pHTN management by HF/pulmonary     #PE/dvt  -Heme consult for hypercoagulable w/u    #ID  staph bacteremia: on antibiotics    Follow up in lung transplant clinic at time of discharge   Rest of care per primary team    Above discussed with Dr. Wise

## 2022-09-12 NOTE — PROGRESS NOTE ADULT - PROBLEM SELECTOR PLAN 5
- hyperdynamic LVSF, new echogenic mass in RA / clot s/p cath directed thrombus aspiration ib 9/3 c/b L common femoral artery pseudoaneurysm, 9/6 repeat duplex- found +hematoma w/o pseudoaneurysm f/u by vascular sx   - heparin gtt - hyperdynamic LVSF, new echogenic mass in RA / clot s/p cath directed thrombus aspiration ib 9/3 c/b L common femoral artery pseudoaneurysm, 9/6 repeat duplex- found +hematoma w/o pseudoaneurysm f/u by vascular sx   - xarelto 20 mg PO D

## 2022-09-12 NOTE — PROGRESS NOTE ADULT - PROBLEM SELECTOR PLAN 7
DVT Prophylaxis: Heparin gtt  Diet: Regular  Dispo: Home with home PT DVT Prophylaxis: xarelto 20 mg PO QD  Diet: Regular  Dispo: Home with home PT

## 2022-09-13 ENCOUNTER — TRANSCRIPTION ENCOUNTER (OUTPATIENT)
Age: 42
End: 2022-09-13

## 2022-09-13 LAB
ANION GAP SERPL CALC-SCNC: 12 MMOL/L — SIGNIFICANT CHANGE UP (ref 5–17)
APTT BLD: 35.9 SEC — HIGH (ref 27.5–35.5)
BASOPHILS # BLD AUTO: 0.46 K/UL — HIGH (ref 0–0.2)
BASOPHILS NFR BLD AUTO: 3.5 % — HIGH (ref 0–2)
BUN SERPL-MCNC: 11 MG/DL — SIGNIFICANT CHANGE UP (ref 7–23)
CALCIUM SERPL-MCNC: 8.9 MG/DL — SIGNIFICANT CHANGE UP (ref 8.4–10.5)
CHLORIDE SERPL-SCNC: 100 MMOL/L — SIGNIFICANT CHANGE UP (ref 96–108)
CO2 SERPL-SCNC: 23 MMOL/L — SIGNIFICANT CHANGE UP (ref 22–31)
CREAT SERPL-MCNC: 0.79 MG/DL — SIGNIFICANT CHANGE UP (ref 0.5–1.3)
EGFR: 96 ML/MIN/1.73M2 — SIGNIFICANT CHANGE UP
ELLIPTOCYTES BLD QL SMEAR: SLIGHT — SIGNIFICANT CHANGE UP
EOSINOPHIL # BLD AUTO: 0 K/UL — SIGNIFICANT CHANGE UP (ref 0–0.5)
EOSINOPHIL NFR BLD AUTO: 0 % — SIGNIFICANT CHANGE UP (ref 0–6)
GIANT PLATELETS BLD QL SMEAR: PRESENT — SIGNIFICANT CHANGE UP
GLUCOSE SERPL-MCNC: 60 MG/DL — LOW (ref 70–99)
HCT VFR BLD CALC: 37.1 % — SIGNIFICANT CHANGE UP (ref 34.5–45)
HGB BLD-MCNC: 11.3 G/DL — LOW (ref 11.5–15.5)
LYMPHOCYTES # BLD AUTO: 18.2 % — SIGNIFICANT CHANGE UP (ref 13–44)
LYMPHOCYTES # BLD AUTO: 2.39 K/UL — SIGNIFICANT CHANGE UP (ref 1–3.3)
MAGNESIUM SERPL-MCNC: 1.7 MG/DL — SIGNIFICANT CHANGE UP (ref 1.6–2.6)
MANUAL SMEAR VERIFICATION: SIGNIFICANT CHANGE UP
MCHC RBC-ENTMCNC: 22.6 PG — LOW (ref 27–34)
MCHC RBC-ENTMCNC: 30.5 GM/DL — LOW (ref 32–36)
MCV RBC AUTO: 74.2 FL — LOW (ref 80–100)
MONOCYTES # BLD AUTO: 0.8 K/UL — SIGNIFICANT CHANGE UP (ref 0–0.9)
MONOCYTES NFR BLD AUTO: 6.1 % — SIGNIFICANT CHANGE UP (ref 2–14)
NEUTROPHILS # BLD AUTO: 9.48 K/UL — HIGH (ref 1.8–7.4)
NEUTROPHILS NFR BLD AUTO: 72.2 % — SIGNIFICANT CHANGE UP (ref 43–77)
PHOSPHATE SERPL-MCNC: 4 MG/DL — SIGNIFICANT CHANGE UP (ref 2.5–4.5)
PLAT MORPH BLD: NORMAL — SIGNIFICANT CHANGE UP
PLATELET # BLD AUTO: 368 K/UL — SIGNIFICANT CHANGE UP (ref 150–400)
POIKILOCYTOSIS BLD QL AUTO: SLIGHT — SIGNIFICANT CHANGE UP
POLYCHROMASIA BLD QL SMEAR: SLIGHT — SIGNIFICANT CHANGE UP
POTASSIUM SERPL-MCNC: 4.4 MMOL/L — SIGNIFICANT CHANGE UP (ref 3.5–5.3)
POTASSIUM SERPL-SCNC: 4.4 MMOL/L — SIGNIFICANT CHANGE UP (ref 3.5–5.3)
RBC # BLD: 5 M/UL — SIGNIFICANT CHANGE UP (ref 3.8–5.2)
RBC # FLD: 19.9 % — HIGH (ref 10.3–14.5)
RBC BLD AUTO: ABNORMAL
SODIUM SERPL-SCNC: 135 MMOL/L — SIGNIFICANT CHANGE UP (ref 135–145)
WBC # BLD: 13.13 K/UL — HIGH (ref 3.8–10.5)
WBC # FLD AUTO: 13.13 K/UL — HIGH (ref 3.8–10.5)

## 2022-09-13 PROCEDURE — 99233 SBSQ HOSP IP/OBS HIGH 50: CPT

## 2022-09-13 PROCEDURE — 99232 SBSQ HOSP IP/OBS MODERATE 35: CPT | Mod: GC

## 2022-09-13 PROCEDURE — 99356: CPT

## 2022-09-13 RX ORDER — CEFAZOLIN SODIUM 1 G
2000 VIAL (EA) INJECTION EVERY 8 HOURS
Refills: 0 | Status: DISCONTINUED | OUTPATIENT
Start: 2022-09-13 | End: 2022-09-14

## 2022-09-13 RX ORDER — CEFAZOLIN SODIUM 1 G
2 VIAL (EA) INJECTION
Qty: 138 | Refills: 0
Start: 2022-09-13 | End: 2022-10-05

## 2022-09-13 RX ADMIN — Medication 20 MILLIGRAM(S): at 06:33

## 2022-09-13 RX ADMIN — PREGABALIN 1000 MICROGRAM(S): 225 CAPSULE ORAL at 12:16

## 2022-09-13 RX ADMIN — Medication 20 MILLIGRAM(S): at 21:56

## 2022-09-13 RX ADMIN — SPIRONOLACTONE 25 MILLIGRAM(S): 25 TABLET, FILM COATED ORAL at 10:17

## 2022-09-13 RX ADMIN — Medication 10 MILLIGRAM(S): at 06:33

## 2022-09-13 RX ADMIN — Medication 1 TABLET(S): at 12:16

## 2022-09-13 RX ADMIN — Medication 100 MILLIGRAM(S): at 10:17

## 2022-09-13 RX ADMIN — Medication 100 MILLIGRAM(S): at 17:24

## 2022-09-13 RX ADMIN — RIVAROXABAN 20 MILLIGRAM(S): KIT at 17:24

## 2022-09-13 RX ADMIN — HYDROMORPHONE HYDROCHLORIDE 2 MILLIGRAM(S): 2 INJECTION INTRAMUSCULAR; INTRAVENOUS; SUBCUTANEOUS at 11:30

## 2022-09-13 RX ADMIN — Medication 500 MICROGRAM(S): at 11:19

## 2022-09-13 RX ADMIN — HYDROMORPHONE HYDROCHLORIDE 2 MILLIGRAM(S): 2 INJECTION INTRAMUSCULAR; INTRAVENOUS; SUBCUTANEOUS at 10:25

## 2022-09-13 RX ADMIN — CHLORHEXIDINE GLUCONATE 1 APPLICATION(S): 213 SOLUTION TOPICAL at 06:38

## 2022-09-13 RX ADMIN — Medication 100 MILLIGRAM(S): at 06:33

## 2022-09-13 RX ADMIN — Medication 100 MILLIGRAM(S): at 02:01

## 2022-09-13 RX ADMIN — Medication 650 MILLIGRAM(S): at 23:36

## 2022-09-13 RX ADMIN — SENNA PLUS 2 TABLET(S): 8.6 TABLET ORAL at 21:56

## 2022-09-13 RX ADMIN — PANTOPRAZOLE SODIUM 40 MILLIGRAM(S): 20 TABLET, DELAYED RELEASE ORAL at 06:32

## 2022-09-13 RX ADMIN — Medication 20 MILLIGRAM(S): at 14:33

## 2022-09-13 NOTE — PROGRESS NOTE ADULT - PROBLEM SELECTOR PLAN 4
- hematoma of left groin, no evidence of pseudoaneurysm.  - Left groin hematoma expanding in size, and new superficial hematoma measuring 3.7x 5.7 x 1.9 cm.   - Per vascular no indication for intervention at this time as no signs of infection or nerve palsies present.   - f/u vascular surgery recommendations  - No evidence of abscess present of the left lower extremity hematoma  - Will monitor H/H  - Repeat US of Left groin hematoma, stable and unchanged (9/12/22)

## 2022-09-13 NOTE — DISCHARGE NOTE PROVIDER - CARE PROVIDER_API CALL
Paramjit Chavis)  Adv Heart Fail Trnsplnt Cardio; Cardiovascular Disease; Internal Medicine  [address] [place] [address]  Phone: [phone]  Fax: [phone]  Follow Up Time: 2 weeks    Shaun Hu)  Thoracic and Cardiac Surgery  [address] [place] [address]  Phone: [phone]  Fax: [phone]  Follow Up Time: 2 weeks

## 2022-09-13 NOTE — PROGRESS NOTE ADULT - SUBJECTIVE AND OBJECTIVE BOX
42 yr old female with PMHx of PulmHTN class I/VI (dx 2014) on continuous Treprostinil (Remodulin) (sildenafil - held initially) c/b Rt heart failure s/p PPM (Dual chamber 2016), Chronic P.E. on rivaroxaban (dx 2017), SVT s/p ablation (5/2020), Asthma (chronic steroid use - prednisone), Fe deficiency anemia admitted on 8/28 for palpitations accompanied by orthopnea/N/V/RUQ abd pain over a 2 day period.  Hospital course c/b hypotension, EDOUARD, transaminitis due to RV sided heart failure requiring Marcela and inotropic support, found to have RA thrombus started on AC, s/p directed thrombus aspiration 8/31/22, c/b Left common femoral artery pseudoaneurysm s/p thrombin injection on 9/3. Consulted for lung transplant evaluation.    Patient seen, evaluated, and examined at bedside with Dr. Wise.  Patient is currently on room air, speaking full sentences, in NAD.  She states she is getting ready for discharge home today. No complaints    PAST MEDICAL & SURGICAL HISTORY:  Tachycardia  Anemia  Pulmonary hypertension  Pulmonary embolism  Asthma  On home O2 nightly at 2L via NC  PE (pulmonary thromboembolism)  on Xarelto 10 mg daily  Sinusitis  Corneal disorder  Right heart failure  CAD (coronary artery disease)  H/O pulmonary hypertension  Pulmonary embolism  Asthma  History of tachycardia    On supplemental oxygen by nasal cannula  O2 @ 2L  Pacemaker  Skin mass  left upper thigh mass  History of tubal ligation    History of pacemaker  12/19 - [employer] model Ingevity 4469      Home Meds: Home Medications:  Atrovent 500 mcg/2.5 mL inhalation solution: 2.5 milliliter(s) inhaled , As Needed (29 Aug 2022 12:31)  Atrovent HFA 17 mcg/inh inhalation aerosol: puff(s) inhaled , As Needed (29 Aug 2022 12:31)  Lasix 20 mg oral tablet: 1 tab(s) orally every other day (29 Aug 2022 12:31)  omeprazole 40 mg oral delayed release capsule: 1 cap(s) orally once a day (29 Aug 2022 12:31)  predniSONE: 10 milligram(s) orally once a day (29 Aug 2022 12:31)  Remodulin 5 mg/mL injectable solution: patient is taking 42ng/kg/min via her own SQ PUMP (29 Aug 2022 12:31)  sildenafil 20 mg oral tablet: 0.5 tab(s) orally once a day (29 Aug 2022 12:31)  Vitamin B12 1000 mcg oral tablet: 1 tab(s) orally once a day (29 Aug 2022 12:31)  Xarelto 10 mg oral tablet: 1 tab(s) orally once a day (29 Aug 2022 12:31)    Allergies: Allergies    adhesives (Rash)  penicillin (Rash)  vancomycin (Rash)    Intolerances    albuterol (Other; Rash)    Soc:   Advanced Directives: Presumed Full Code     Performs activities of daily living with no assistance   Non-diabetic   Requires 0-2 L NC at rest   not on assisted ventilation    ROS:    REVIEW OF SYSTEMS    [x] A ten-point review of systems was otherwise negative except as noted.  [ ] Due to altered mental status/intubation, subjective information were not able to be obtained from the patient. History was obtained, to the extent possible, from review of the chart and collateral sources of information.      CURRENT MEDICATIONS:   --------------------------------------------------------------------------------------  Neurologic Medications  acetaminophen     Tablet .. 650 milliGRAM(s) Oral every 6 hours PRN Mild Pain (1 - 3)  HYDROmorphone   Tablet 2 milliGRAM(s) Oral every 6 hours PRN Severe Pain (7 - 10)  melatonin 3 milliGRAM(s) Oral at bedtime PRN Insomnia  ondansetron Injectable 4 milliGRAM(s) IV Push every 8 hours PRN Nausea and/or Vomiting    Respiratory Medications  ipratropium    for Nebulization 500 MICROGram(s) Nebulizer every 6 hours PRN Bronchospasm    Cardiovascular Medications  furosemide    Tablet 20 milliGRAM(s) Oral <User Schedule>  sildenafil (REVATIO) 20 milliGRAM(s) Oral <User Schedule>  spironolactone 25 milliGRAM(s) Oral daily    Gastrointestinal Medications  aluminum hydroxide/magnesium hydroxide/simethicone Suspension 30 milliLiter(s) Oral every 4 hours PRN Dyspepsia  cyanocobalamin 1000 MICROGram(s) Oral daily  multivitamin 1 Tablet(s) Oral daily  pantoprazole    Tablet 40 milliGRAM(s) Oral before breakfast  polyethylene glycol 3350 17 Gram(s) Oral every 12 hours  senna 2 Tablet(s) Oral at bedtime  sodium chloride 0.9% lock flush 10 milliLiter(s) IV Push every 1 hour PRN Pre/post blood products, medications, blood draw, and to maintain line patency    Hematologic/Oncologic Medications  rivaroxaban 20 milliGRAM(s) Oral with dinner    Antimicrobial/Immunologic Medications  ceFAZolin   IVPB 2000 milliGRAM(s) IV Intermittent every 8 hours  ceFAZolin   IVPB      doxycycline monohydrate Capsule 100 milliGRAM(s) Oral every 12 hours    Endocrine/Metabolic Medications  predniSONE   Tablet 10 milliGRAM(s) Oral daily    Topical/Other Medications  artificial  tears Solution 1 Drop(s) Both EYES three times a day PRN Dry Eyes  chlorhexidine 4% Liquid 1 Application(s) Topical <User Schedule>  lidocaine 1% Injectable 50 milliLiter(s) Local Injection once  Remodulin (Treprostinil) 5mG/mL 1 Vial(s) 1 Vial(s) SubCutaneous Continuous Pump  sodium chloride 0.65% Nasal 1 Spray(s) Both Nostrils every 6 hours PRN Nasal Congestion    --------------------------------------------------------------------------------------    VITAL SIGNS, INS/OUTS (last 24 hours):  --------------------------------------------------------------------------------------  ICU Vital Signs Last 24 Hrs  T(C): 37.2 (13 Sep 2022 05:39), Max: 37.2 (13 Sep 2022 05:39)  T(F): 98.9 (13 Sep 2022 05:39), Max: 98.9 (13 Sep 2022 05:39)  HR: 105 (13 Sep 2022 05:39) (96 - 112)  BP: 112/78 (13 Sep 2022 05:39) (103/66 - 129/81)  BP(mean): --  ABP: --  ABP(mean): --  RR: 18 (13 Sep 2022 05:39) (18 - 18)  SpO2: 97% (13 Sep 2022 05:39) (96% - 99%)    O2 Parameters below as of 13 Sep 2022 05:39  Patient On (Oxygen Delivery Method): room air          I&O's Summary    12 Sep 2022 07:01  -  13 Sep 2022 07:00  --------------------------------------------------------  IN: 809 mL / OUT: 0 mL / NET: 809 mL      --------------------------------------------------------------------------------------    EXAM:  General: room air, no acute distress  Chest: appears normal  Respiratory: clear to auscultation bilaterally, no w/r/r  Cardiac: RRR, no murmur  Gastrointestinal: overwight, soft, non distended, non tender  Extremities: trace bilateral lower extremity edema  Neuro: AAO x 3   --------------------------------------------------------------------------------------           42 yr old female with PMHx of PulmHTN class I/VI (dx 2014) on continuous Treprostinil (Remodulin) (sildenafil - held initially) c/b Rt heart failure s/p PPM (Dual chamber 2016), Chronic P.E. on rivaroxaban (dx 2017), SVT s/p ablation (5/2020), Asthma (chronic steroid use - prednisone), Fe deficiency anemia admitted on 8/28 for palpitations accompanied by orthopnea/N/V/RUQ abd pain over a 2 day period.  Hospital course c/b hypotension, EDOUARD, transaminitis due to RV sided heart failure requiring Marcela and inotropic support, found to have RA thrombus started on AC, s/p directed thrombus aspiration 8/31/22, c/b Left common femoral artery pseudoaneurysm s/p thrombin injection on 9/3. Consulted for lung transplant evaluation.    Patient seen, evaluated, and examined at bedside with Dr. Wise.  Patient is currently on room air, speaking full sentences, in NAD.  She states she is getting ready for discharge home today. No complaints    PAST MEDICAL & SURGICAL HISTORY:  Tachycardia  Anemia  Pulmonary hypertension  Pulmonary embolism  Asthma  On home O2 nightly at 2L via NC  PE (pulmonary thromboembolism)  on Xarelto 10 mg daily  Sinusitis  Corneal disorder  Right heart failure  CAD (coronary artery disease)  H/O pulmonary hypertension  Pulmonary embolism  Asthma  History of tachycardia    On supplemental oxygen by nasal cannula  O2 @ 2L  Pacemaker  Skin mass  left upper thigh mass  History of tubal ligation    History of pacemaker  12/19 - [employer] model Ingevity 4469      Home Meds: Home Medications:  Atrovent 500 mcg/2.5 mL inhalation solution: 2.5 milliliter(s) inhaled , As Needed (29 Aug 2022 12:31)  Atrovent HFA 17 mcg/inh inhalation aerosol: puff(s) inhaled , As Needed (29 Aug 2022 12:31)  Lasix 20 mg oral tablet: 1 tab(s) orally every other day (29 Aug 2022 12:31)  omeprazole 40 mg oral delayed release capsule: 1 cap(s) orally once a day (29 Aug 2022 12:31)  predniSONE: 10 milligram(s) orally once a day (29 Aug 2022 12:31)  Remodulin 5 mg/mL injectable solution: patient is taking 42ng/kg/min via her own SQ PUMP (29 Aug 2022 12:31)  sildenafil 20 mg oral tablet: 0.5 tab(s) orally once a day (29 Aug 2022 12:31)  Vitamin B12 1000 mcg oral tablet: 1 tab(s) orally once a day (29 Aug 2022 12:31)  Xarelto 10 mg oral tablet: 1 tab(s) orally once a day (29 Aug 2022 12:31)    Allergies: Allergies    adhesives (Rash)  penicillin (Rash)  vancomycin (Rash)    Intolerances    albuterol (Other; Rash)    Soc:   Advanced Directives: Presumed Full Code     Performs activities of daily living with no assistance   Non-diabetic   Requires 0-2 L NC at rest   not on assisted ventilation    ROS:    REVIEW OF SYSTEMS    [x] A ten-point review of systems was otherwise negative except as noted.  [ ] Due to altered mental status/intubation, subjective information were not able to be obtained from the patient. History was obtained, to the extent possible, from review of the chart and collateral sources of information.      CURRENT MEDICATIONS:   --------------------------------------------------------------------------------------  Neurologic Medications  acetaminophen     Tablet .. 650 milliGRAM(s) Oral every 6 hours PRN Mild Pain (1 - 3)  HYDROmorphone   Tablet 2 milliGRAM(s) Oral every 6 hours PRN Severe Pain (7 - 10)  melatonin 3 milliGRAM(s) Oral at bedtime PRN Insomnia  ondansetron Injectable 4 milliGRAM(s) IV Push every 8 hours PRN Nausea and/or Vomiting    Respiratory Medications  ipratropium    for Nebulization 500 MICROGram(s) Nebulizer every 6 hours PRN Bronchospasm    Cardiovascular Medications  furosemide    Tablet 20 milliGRAM(s) Oral <User Schedule>  sildenafil (REVATIO) 20 milliGRAM(s) Oral <User Schedule>  spironolactone 25 milliGRAM(s) Oral daily    Gastrointestinal Medications  aluminum hydroxide/magnesium hydroxide/simethicone Suspension 30 milliLiter(s) Oral every 4 hours PRN Dyspepsia  cyanocobalamin 1000 MICROGram(s) Oral daily  multivitamin 1 Tablet(s) Oral daily  pantoprazole    Tablet 40 milliGRAM(s) Oral before breakfast  polyethylene glycol 3350 17 Gram(s) Oral every 12 hours  senna 2 Tablet(s) Oral at bedtime  sodium chloride 0.9% lock flush 10 milliLiter(s) IV Push every 1 hour PRN Pre/post blood products, medications, blood draw, and to maintain line patency    Hematologic/Oncologic Medications  rivaroxaban 20 milliGRAM(s) Oral with dinner    Antimicrobial/Immunologic Medications  ceFAZolin   IVPB 2000 milliGRAM(s) IV Intermittent every 8 hours  ceFAZolin   IVPB      doxycycline monohydrate Capsule 100 milliGRAM(s) Oral every 12 hours    Endocrine/Metabolic Medications  predniSONE   Tablet 10 milliGRAM(s) Oral daily    Topical/Other Medications  artificial  tears Solution 1 Drop(s) Both EYES three times a day PRN Dry Eyes  chlorhexidine 4% Liquid 1 Application(s) Topical <User Schedule>  lidocaine 1% Injectable 50 milliLiter(s) Local Injection once  Remodulin (Treprostinil) 5mG/mL 1 Vial(s) 1 Vial(s) SubCutaneous Continuous Pump  sodium chloride 0.65% Nasal 1 Spray(s) Both Nostrils every 6 hours PRN Nasal Congestion    --------------------------------------------------------------------------------------    VITAL SIGNS, INS/OUTS (last 24 hours):  --------------------------------------------------------------------------------------  ICU Vital Signs Last 24 Hrs  T(C): 37.2 (13 Sep 2022 05:39), Max: 37.2 (13 Sep 2022 05:39)  T(F): 98.9 (13 Sep 2022 05:39), Max: 98.9 (13 Sep 2022 05:39)  HR: 105 (13 Sep 2022 05:39) (96 - 112)  BP: 112/78 (13 Sep 2022 05:39) (103/66 - 129/81)  BP(mean): --  ABP: --  ABP(mean): --  RR: 18 (13 Sep 2022 05:39) (18 - 18)  SpO2: 97% (13 Sep 2022 05:39) (96% - 99%)    O2 Parameters below as of 13 Sep 2022 05:39  Patient On (Oxygen Delivery Method): room air      I&O's Summary    12 Sep 2022 07:01  -  13 Sep 2022 07:00  --------------------------------------------------------  IN: 809 mL / OUT: 0 mL / NET: 809 mL      --------------------------------------------------------------------------------------    EXAM:  General: room air, no acute distress  Chest: appears normal  Respiratory: clear to auscultation bilaterally, no w/r/r  Cardiac: RRR, no murmur  Gastrointestinal: overwight, soft, non distended, non tender  Extremities: trace bilateral lower extremity edema  Neuro: AAO x 3   --------------------------------------------------------------------------------------

## 2022-09-13 NOTE — PROGRESS NOTE ADULT - SUBJECTIVE AND OBJECTIVE BOX
Subjective:  - feeling well walking in room without SOB. Denies orthopnea, PND, ABD distention and LE swelling.     Medications:  acetaminophen     Tablet .. 650 milliGRAM(s) Oral every 6 hours PRN  aluminum hydroxide/magnesium hydroxide/simethicone Suspension 30 milliLiter(s) Oral every 4 hours PRN  artificial  tears Solution 1 Drop(s) Both EYES three times a day PRN  ceFAZolin   IVPB 2000 milliGRAM(s) IV Intermittent every 8 hours  chlorhexidine 4% Liquid 1 Application(s) Topical <User Schedule>  cyanocobalamin 1000 MICROGram(s) Oral daily  doxycycline monohydrate Capsule 100 milliGRAM(s) Oral every 12 hours  furosemide    Tablet 20 milliGRAM(s) Oral <User Schedule>  HYDROmorphone   Tablet 2 milliGRAM(s) Oral every 6 hours PRN  ipratropium    for Nebulization 500 MICROGram(s) Nebulizer every 6 hours PRN  lidocaine 1% Injectable 50 milliLiter(s) Local Injection once  melatonin 3 milliGRAM(s) Oral at bedtime PRN  multivitamin 1 Tablet(s) Oral daily  ondansetron Injectable 4 milliGRAM(s) IV Push every 8 hours PRN  pantoprazole    Tablet 40 milliGRAM(s) Oral before breakfast  polyethylene glycol 3350 17 Gram(s) Oral every 12 hours  predniSONE   Tablet 10 milliGRAM(s) Oral daily  Remodulin (Treprostinil) 5mG/mL 1 Vial(s) 1 Vial(s) SubCutaneous Continuous Pump  rivaroxaban 20 milliGRAM(s) Oral with dinner  senna 2 Tablet(s) Oral at bedtime  sildenafil (REVATIO) 20 milliGRAM(s) Oral <User Schedule>  sodium chloride 0.65% Nasal 1 Spray(s) Both Nostrils every 6 hours PRN  sodium chloride 0.9% lock flush 10 milliLiter(s) IV Push every 1 hour PRN  spironolactone 25 milliGRAM(s) Oral daily      Physical Exam:    Vitals:  Vital Signs Last 24 Hours  T(C): 37.1 (09-13-22 @ 10:50), Max: 37.2 (09-13-22 @ 05:39)  HR: 113 (09-13-22 @ 10:50) (96 - 113)  BP: 122/84 (09-13-22 @ 10:50) (103/66 - 129/81)  RR: 18 (09-13-22 @ 10:50) (18 - 18)  SpO2: 99% (09-13-22 @ 10:50) (96% - 99%)    I&O's Summary    12 Sep 2022 07:01  -  13 Sep 2022 07:00  --------------------------------------------------------  IN: 809 mL / OUT: 0 mL / NET: 809 mL    Tele: SR/ST 90-100s    General: No distress. Comfortable.  HEENT: EOM intact.  Neck: Neck supple. JVP does not appear elevated, no HJR  Chest: Clear to auscultation bilaterally  CV: Normal S1 and S2. III/VI HSM  Abdomen: Soft, non-distended, non-tender  Extremities: Warm peripherally. No edema  Neurology: Alert and oriented times three. Sensation intact  Psych: Affect normal    Labs:                        11.3   13.13 )-----------( 368      ( 13 Sep 2022 07:40 )             37.1     09-13    135  |  100  |  11  ----------------------------<  60<L>  4.4   |  23  |  0.79    Ca    8.9      13 Sep 2022 07:33  Phos  4.0     09-13  Mg     1.7     09-13    TPro  6.7  /  Alb  3.4  /  TBili  0.6  /  DBili  x   /  AST  22  /  ALT  26  /  AlkPhos  51  09-12    PTT - ( 13 Sep 2022 08:16 )  PTT:35.9 sec

## 2022-09-13 NOTE — DISCHARGE NOTE PROVIDER - HOSPITAL COURSE
42 year old woman with Pulmonary Hypertension Group I/IV (2014) on continuous treprostinil (remodulin), right heart failure, PPM (2016 dual chamber [employer]), chronic PE (2017), SVT w/ ablation (5/2020), asthma, iron deficiency anemia who was admitted for palpitations associated with orthopnea, nausea, vomiting, and right upper quadrant pain on 8/28/22. Course was complicated by hypotension associated with chest pain, transaminitis, EDOUARD, requiring patient to be transferred to MICU on 8/28/22 for cardiogenic/obstructive shock 2/2 to acute on chronic pulmonary hypertension. Patient was started on dobutamine, diuretic, and nitric oxide gtt. Course was complicated by TTE showing echogenic mass in RA concerning for clot in transit. CT-A chest was done showing severe dilation of pulmonary arteries without new PE. Patient received catheter-directed thrombus aspiration on 8/31/22 by interventional cardiology that led to partial removal of the thrombus. Patient was weaned off inotropes, and nitric oxide. Patient was diuresed heavily and lost ~ 20 lbs in the ICU. Course was complicated by left common femoral artery pseudoaneurysm, in which thrombin was injected by vascular surgery on 9/3. Repeat ultrasound showed hematoma present and no pseudoaneurysm. Patient was weaned off oxygen. Patient was downgraded to floors on 9/7/22. Patient was noted to have a staph lugdunesis bacteremia present on blood cultures drawn in the MICU. Patient was not adequately treated as it is a virulent coagulase-negative staph. Patient is to complete treatment of staph lugdunesis bacteremia on 10/5/22 for a 28 day course of IV antibiotics. Repeat cultures have been negative. Patient had TTE negative for vegetations. MYRIAM was considered however patient is high risk and requires intubation and anesthesia to undergo the procedure. As per pulmonary's recommendations MYRIAM was deferred given overall high risk of procedure. Course was complicated by right anterior anterior wall abscess present subcutaneously at the site of infusion of continuous treprostinil. While the patient was in the MICU the patient switched her subcutaneous pump from the right to the left side due to pain and swelling at the right side. Patient had abscess incised and drained by surgery. Wound culture grew MRSA. Patient is to complete treatment for MRSA skin/soft tissue infection on 9/20 with doxycycline 100 mg BID. Course was also complicated by left lower extremity-groin hematoma. On the third ultrasound of the hematoma, there was the discovery of a superficial hematoma and expansion of the exisiting hematoma. Heparin gtt was discontinued that was used to treat for the remaining thrombus present in the right atrium. Course was then complicated by right upper extremity swelling, duplex of ARELIS showing DVTs present in radial and cephalic vein. Patient was restarted on AC, had stable hemoglobin, repeat ultrasound of the left leg hematoma were stable and transitioned to xarelto 20 mg PO QD. Patient was evaluated by transplant surgery during hospitalization. Lung transplant was discussed with patient and she is agreeable, however she needs to reach a lower BMI less than 28 to be a candidate. She is motivated and to follow-up with transplant surgery outpatient. She is to follow-up with her pulmonologist Dr. Yoon in 2 weeks. She is to follow-up with cardiologist Dr. Chavis in 2 weeks. She is medically optimized and stable for discharge. She is to have home PT. She had midline placed (removal date by 10/6/22) that is available for her cefazolin 2 mg q 8. She will have weekly CBC and CMP while on IV antibiotics.    Of note  Cefazolin 2 mg q 8 completion date is 10/5/22  Doxycycline 100 mg BID completion date is 10/20/22.  Xarelto 20 mg PO QD has been started, increased from 10 mg PO QD home dose  Sildenafil is now 20 mg TID, increased from 10 mg once a day  Continue infusion of treprostinil   42 year old woman with Pulmonary Hypertension Group I/IV (2014) on continuous treprostinil (remodulin), right heart failure, PPM (2016 dual chamber [employer]), chronic PE (2017), SVT w/ ablation (5/2020), asthma, iron deficiency anemia who was admitted for palpitations associated with orthopnea, nausea, vomiting, and right upper quadrant pain on 8/28/22. Course was complicated by hypotension associated with chest pain, transaminitis, EDOUARD, requiring patient to be transferred to MICU on 8/28/22 for cardiogenic/obstructive shock 2/2 to acute on chronic pulmonary hypertension. Patient was started on dobutamine, diuretic, and nitric oxide gtt. Course was complicated by TTE showing echogenic mass in RA concerning for clot in transit. CT-A chest was done showing severe dilation of pulmonary arteries without new PE. Patient received catheter-directed thrombus aspiration on 8/31/22 by interventional cardiology that led to partial removal of the thrombus. Patient was weaned off inotropes, and nitric oxide. Patient was diuresed heavily and lost ~ 20 lbs in the ICU. Course was complicated by left common femoral artery pseudoaneurysm, in which thrombin was injected by vascular surgery on 9/3. Repeat ultrasound showed hematoma present and no pseudoaneurysm. Patient was weaned off oxygen. Patient was downgraded to floors on 9/7/22. Patient was noted to have a staph lugdunesis bacteremia present on blood cultures drawn in the MICU. Patient is to be treated empirically with 4 weeks of antibiotics as staph lugdunesis is a virulent coagulase-negative staphylococcus. Patient is to complete treatment of staph lugdunesis bacteremia on 10/5/22 for a 28 day course of IV antibiotics. Repeat cultures have been negative. Patient had TTE negative for vegetations. MYRIAM was considered by ID however patient is high risk and requires intubation and anesthesia to undergo the procedure. As per pulmonary's recommendations MYRIAM was deferred given overall high risk of procedure. Course was complicated by right anterior wall abscess present subcutaneously at the site of infusion of continuous treprostinil. While the patient was in the MICU the patient switched her subcutaneous pump from the right to the left side due to pain and swelling at the right side. Abscess was incised and drained by general surgery. Contects were cultured and grew MRSA. Patient is to complete treatment for MRSA skin/soft tissue infection on 9/20 with doxycycline 100 mg BID. Course was also complicated by left lower extremity-groin hematoma. On the third ultrasound of the hematoma, there was the discovery of a new superficial hematoma and expansion of the existing hematoma. Patient was on a Heparin gtt for the remaining thrombus present in the right atrium, however discontinued iso of new bleeding. Course was then further complicated by right upper extremity swelling, duplex of RUE showing DVTs present in radial and cephalic vein. Patient was restarted on AC and received a CT abdomen and pelvis to look for signs of bleeding as well as look for signs of abscess present in the left lower extremity hematoma. CT A&P was negative for RP bleeds or abscess present. Patient had stable hemoglobin while on AC, repeat ultrasound of the left leg hematoma was stable and patient was transitioned to xarelto 20 mg PO QD. Patient was evaluated by transplant surgery during hospitalization. Lung transplant was discussed with patient and she is agreeable, however she needs to reach a BMI of less than 28 to be a candidate. She is motivated and to follow-up with transplant surgery outpatient. She is to follow-up with her pulmonologist Dr. Yoon in 2 weeks. She is to follow-up with cardiologist Dr. Paramjit Chavis in 2 weeks. She is to follow-up with hematology outpatient for a hypercoagulability work-up given her history of chronic PE, atrial thrombus, and RUE DVT. She has been evaluated by PT who recommend home PT. She had midline placed (removal date by 10/6/22) that was placed for her to received cefazolin 2 g every 8 hours. She will have weekly CBC and CMP while on IV antibiotics. She is medically optimized and stable for discharge.    Of note  Cefazolin 2 mg q 8 completion date is 10/5/22  Doxycycline 100 mg BID completion date is 10/20/22.  Xarelto 20 mg PO QD has been started, increased from 10 mg PO QD home dose  Sildenafil is now 20 mg TID, increased from 10 mg once a day  Continue infusion of treprostinil  Furosemide 20 mg every other day   42 year old woman with Pulmonary Hypertension Group I/IV (2014) on continuous treprostinil (remodulin), right heart failure, PPM (2016 dual chamber [employer]), chronic PE (2017), SVT w/ ablation (5/2020), asthma, iron deficiency anemia who was admitted for palpitations associated with orthopnea, nausea, vomiting, and right upper quadrant pain on 8/28/22. Course was complicated by hypotension associated with chest pain, transaminitis, EDOUARD, requiring patient to be transferred to MICU on 8/28/22 for cardiogenic/obstructive shock 2/2 to acute on chronic pulmonary hypertension. Patient was started on dobutamine, diuretic, and nitric oxide gtt. Course was complicated by TTE showing echogenic mass in RA concerning for clot in transit. CT-A chest was done showing severe dilation of pulmonary arteries without new PE. Patient received catheter-directed thrombus aspiration on 8/31/22 by interventional cardiology that led to partial removal of the thrombus. Patient was weaned off inotropes, and nitric oxide. Patient was diuresed heavily and lost ~ 20 lbs in the ICU. Course was complicated by left common femoral artery pseudoaneurysm, in which thrombin was injected by vascular surgery on 9/3. Repeat ultrasound showed hematoma present and no pseudoaneurysm. Patient was weaned off oxygen. Patient was downgraded to floors on 9/7/22. Patient was noted to have a staph lugdunesis bacteremia present on blood cultures drawn in the MICU. Patient is to be treated empirically with 4 weeks of antibiotics as staph lugdunesis is a virulent coagulase-negative staphylococcus. Patient is to complete treatment of staph lugdunesis bacteremia on 10/5/22 for a 28 day course of IV antibiotics. Repeat cultures have been negative. Patient had TTE negative for vegetations. MYRIAM was considered by ID however patient is high risk and requires intubation and anesthesia to undergo the procedure. As per pulmonary's recommendations MYRIAM was deferred given overall high risk of procedure. Course was complicated by right anterior wall abscess present subcutaneously at the site of infusion of continuous treprostinil. While the patient was in the MICU the patient switched her subcutaneous pump from the right to the left side due to pain and swelling at the right side. Abscess was incised and drained by general surgery. Contents were cultured and grew MRSA. Patient is to complete treatment for MRSA skin/soft tissue infection on 9/20 with doxycycline 100 mg BID. Course was also complicated by left lower extremity-groin hematoma. On the third ultrasound of the hematoma, there was the discovery of a new superficial hematoma and expansion of the existing hematoma. Patient was on a Heparin gtt for the remaining thrombus present in the right atrium, however discontinued iso of new bleeding. Course was then further complicated by right upper extremity swelling, duplex of RUE showing DVTs present in radial and cephalic vein. Patient was restarted on AC and received a CT abdomen and pelvis to look for signs of bleeding as well as look for signs of abscess present in the left lower extremity hematoma. CT A&P was negative for RP bleeds or abscess present. Patient had stable hemoglobin while on AC, repeat ultrasound of the left leg hematoma was stable and patient was transitioned to xarelto 20 mg PO QD. Patient was evaluated by transplant surgery during hospitalization. Lung transplant was discussed with patient and she is agreeable, however she needs to reach a BMI of less than 28 to be a candidate. She is motivated and to follow-up with transplant surgery outpatient. She is to follow-up with her pulmonologist Dr. Yoon in 2 weeks. She is to follow-up with cardiologist Dr. Paramjit Chavis in 2 weeks. She is to follow-up with hematology outpatient for a hypercoagulability work-up given her history of chronic PE, atrial thrombus, and RUE DVT. She has been evaluated by PT who recommend home PT. She had midline placed (removal date by 10/6/22) that was placed for her to received cefazolin 2 g every 8 hours. She will have weekly CBC and CMP while on IV antibiotics. She is medically optimized and stable for discharge.    Of note  Cefazolin 2 mg q 8 completion date is 10/5/22  Doxycycline 100 mg BID completion date is 10/20/22.  Xarelto 20 mg PO QD has been started, increased from 10 mg PO QD home dose  Sildenafil is now 20 mg TID, increased from 10 mg once a day  Continue infusion of treprostinil @ 42 ng/kg  Furosemide 20 mg every other day  Spironolactone 25 mg Daily  Prednisone 10 mg Daily 42 year old woman with Pulmonary Hypertension Group I/IV (2014) on continuous treprostinil (remodulin), right heart failure, PPM (2016 dual chamber [employer]), chronic PE (2017), SVT w/ ablation (5/2020), asthma, iron deficiency anemia who was admitted for palpitations associated with orthopnea, nausea, vomiting, and right upper quadrant pain on 8/28/22. Course was complicated by hypotension associated with chest pain, transaminitis, EDOUARD, requiring patient to be transferred to MICU on 8/28/22 for cardiogenic/obstructive shock 2/2 to acute on chronic pulmonary hypertension. Patient was started on dobutamine, diuretic, and nitric oxide gtt. Course was complicated by TTE showing echogenic mass in RA concerning for clot in transit. CT-A chest was done showing severe dilation of pulmonary arteries without new PE. Patient received catheter-directed thrombus aspiration on 8/31/22 by interventional cardiology that led to partial removal of the thrombus. Patient was weaned off inotropes, and nitric oxide. Patient was diuresed heavily and lost ~ 20 lbs in the ICU. Course was complicated by left common femoral artery pseudoaneurysm, in which thrombin was injected by vascular surgery on 9/3. Repeat ultrasound showed hematoma present and no pseudoaneurysm. Patient was weaned off oxygen. Patient was downgraded to floors on 9/7/22. Patient was noted to have a staph lugdunesis bacteremia present on blood cultures drawn in the MICU. Patient is to be treated empirically with 4 weeks of antibiotics as staph lugdunesis is a virulent coagulase-negative staphylococcus. Patient is to complete treatment of staph lugdunesis bacteremia on 10/5/22 for a 28 day course of IV antibiotics. Repeat cultures have been negative. Patient had TTE negative for vegetations. MYRIAM was considered by ID however patient is high risk and requires intubation and anesthesia to undergo the procedure. As per pulmonary's recommendations MYRIAM was deferred given overall high risk of procedure. Course was complicated by right abdominal wall abscess present subcutaneously at the site of infusion of continuous treprostinil. While the patient was in the MICU the patient switched her subcutaneous pump from the right to the left side due to pain and swelling at the right side. Abscess was incised and drained by general surgery. Contents were cultured and grew MRSA. Patient is to complete treatment for MRSA skin/soft tissue infection on 9/20 with doxycycline 100 mg BID. Course was also complicated by left lower extremity-groin hematoma. On the third ultrasound of the hematoma, there was the discovery of a new superficial hematoma and expansion of the existing hematoma. Patient was on a Heparin gtt for the remaining thrombus present in the right atrium, however discontinued iso of new bleeding. Course was then further complicated by right upper extremity swelling, duplex of RUE showing DVTs present in radial and cephalic vein. Patient was restarted on AC and received a CT abdomen and pelvis to look for signs of bleeding as well as look for signs of abscess present in the left lower extremity hematoma. CT A&P was negative for RP bleeds or abscess present. Patient had stable hemoglobin while on AC, repeat ultrasound of the left leg hematoma was stable and patient was transitioned to xarelto 20 mg PO QD. Patient was evaluated by transplant surgery during hospitalization. Lung transplant was discussed with patient and she is agreeable, however she needs to reach a BMI of less than 28 to be a candidate. She is motivated and to follow-up with transplant surgery outpatient. She is to follow-up with her pulmonologist Dr. Yoon in 2 weeks. She is to follow-up with cardiologist Dr. Paramjit Chavis in 2 weeks. She is to follow-up with hematology outpatient for a hypercoagulability work-up given her history of chronic PE, atrial thrombus, and RUE DVT. She has been evaluated by PT who recommend home PT. She had midline placed (removal date by 10/6/22) that was placed for her to received cefazolin 2 g every 8 hours. She will have weekly CBC and CMP while on IV antibiotics. She is medically optimized and stable for discharge.    Of note  Cefazolin 2 mg q 8 completion date is 10/5/22  Doxycycline 100 mg BID completion date is 9/20/22.  Xarelto 20 mg PO QD has been started, increased from 10 mg PO QD home dose  Sildenafil is now 20 mg TID, increased from 10 mg once a day  Continue infusion of treprostinil @ 42 ng/kg  Furosemide 20 mg every other day  Spironolactone 25 mg Daily  Prednisone 10 mg Daily

## 2022-09-13 NOTE — DISCHARGE NOTE PROVIDER - NSDCFUSCHEDAPPT_GEN_ALL_CORE_FT
[facility]  [id number]  Scheduled Appointment: 09/15/2022    Darrius Tamayo  [facility]  DERM [address]  Scheduled Appointment: 09/15/2022    Vivian Yoon  [facility]  PULMMED [address]  Scheduled Appointment: 09/27/2022     [facility]  [id number]  Scheduled Appointment: 09/15/2022    [facility]  HEARTFAIL [address]  Scheduled Appointment: 09/21/2022    Vivian Yoon  [facility]  PULMMED [address]  Scheduled Appointment: 09/27/2022    Darrius Tamayo  [facility]  DERM [address]  Scheduled Appointment: 11/16/2022

## 2022-09-13 NOTE — DISCHARGE NOTE PROVIDER - PROVIDER TOKENS
PROVIDER:[TOKEN:[[id number]],FOLLOWUP:[2 weeks]],PROVIDER:[TOKEN:[[id number]],FOLLOWUP:[2 weeks]]

## 2022-09-13 NOTE — DISCHARGE NOTE PROVIDER - NSDCFUADDAPPT_GEN_ALL_CORE_FT
Patient has appointment with pulmonologist who is also her PCP Dr. Yoon on 9/27/22.    Please follow with a hematologist for a hypercoagulability work-up, you are prone to blood clotting and it is important to identify the reason.    Please follow up with transplant team for lung transplant evaluation

## 2022-09-13 NOTE — DISCHARGE NOTE PROVIDER - NSDCCPCAREPLAN_GEN_ALL_CORE_FT
PRINCIPAL DISCHARGE DIAGNOSIS  Diagnosis: Shortness of breath  Assessment and Plan of Treatment: You came into the hospital short of breath. It was found that you had acute exacerbation of your pulmonary hypertension. Due to your pulmonary hypertension, blood was not able to go from the right side of your heart to your left side. This led to low blood pressure, also known as shock. You were treated with medications that increased the pumping of your heart, as well as reduced your pulmonary hypertension. You received heavy diuresis to remove the extra fluid in your lungs and body that collected due to the failure of your heart to pump. Please continue your remodulin pump, sildenafil 20 mg three times a day, and prednisone 10 mg daily. Please follow-up with your cardiologist, Dr. Chavis and your pulmonologist Dr. Yoon for routine monitoring and management of this condition.  Please return to the hospital if you experience fever, chills, severe headache, dizziness, lightheadedness, loss of consciousness, visual disturbance, chest pain, palpitations, shortness of breath,  abdominal pain, nausea, vomiting, diarrhea, blood in your vomit/stool/urine, burning with urination or change in urinary pattern, numbness, weakness, tingling, or other alarming symptoms.      SECONDARY DISCHARGE DIAGNOSES  Diagnosis: Bacteremia  Assessment and Plan of Treatment: During your stay in the hospital we mary blood cultures. It came back positive for a bacteria : staphylococcus lugdunesis. This bacteria can be very harmful to the body and your organs. We treated you with IV antibiotics while you were in the hospital. We placed a midline (which is an IV line) so that you can reliably administer IV antibiotics. Please continue to treat this bloodstream infection with cefazolin 2 grams every 8 hours by IV. Please get your blood drawn every week so that it may be monitored while on IV antibiotics.   Please return to the hospital if you notice any redness, swelling, pain or warmth at the site of the midline or develop a fever, It may indicate an infection or your midline is not properly working.    Diagnosis: Deep vein thrombosis (DVT) of right upper extremity  Assessment and Plan of Treatment: It was found that you had blood clots present in your right arm. These blood clots are concerning because they can travel to your heart and to your lungs. We treated you with blood thinners while you were in the hospital. We increased your home dose of xarelto from 10 mg daily to 20 mg daily because that it is the optimal dose for management of a deep vein thrombosis. Please continue to take xarelto at 20 mg daily for the next 3-6 months. Follow up with Dr. Yoon for adjustment of this dose and for monitoring of improvement of your right upper extremity.      Diagnosis: Atrial thrombus  Assessment and Plan of Treatment: You had blood clots present in one of the chambers of your heart. Our interventional cardiologist was able to remove some of the clot, however some of the clot is still present. Continue to take xarelto 20 mg daily to manage this condition. Please follow with Dr. Chavis, your cardiologist for monitoring of this thrombus.    Diagnosis: H/O drainage of abscess  Assessment and Plan of Treatment: During your stay we drained an abscess present on your belly. It occurred due to the chronic implantation of your infusion pump. We cultured the wound abscess and it came back positive for a bacteria: MRSA. We treated this infection with oral antibiotics. Please continue to take your doxycycline two times a day until September 20th, 2020.    Diagnosis: Hematoma  Assessment and Plan of Treatment: It was found that you developed a hematoma (collection of blood) at the site of your vascular access for removal of the blood clot in your heart. Vascular surgery treated this hematoma with thrombin to help your blood clot and prevent progression. We repeated ultrasounds of this hematoma present in your femoral artery to make sure that it did not get larger. When it did get larger we stopped anticoagulation, however the course was complicated by blood clots found in your right arm. We restarted anticoagulation, and monitored your hemoglobin levels for any drops as well as repeated imaging of your hematoma. The hematoma appeared stable and consistent with prior imaging and your hemoglobin remained stable while on anticoagulation. We did a CT of your hematoma to look for signs of infection, however there was no sign of infection.     PRINCIPAL DISCHARGE DIAGNOSIS  Diagnosis: Shortness of breath  Assessment and Plan of Treatment: You came into the hospital short of breath. It was found that you had acute exacerbation of your pulmonary hypertension. Due to your pulmonary hypertension, blood was not able to go from the right side of your heart to your left side. This led to low blood pressure, also known as shock. You were treated with medications that increased the pumping of your heart, as well as reduced your pulmonary hypertension. You received heavy diuresis to remove the extra fluid in your lungs and body that collected due to the failure of your heart to pump. Please continue your remodulin pump, sildenafil 20 mg three times a day, and prednisone 10 mg daily. Please follow-up with your cardiologist, Dr. Chavis and your pulmonologist Dr. Yoon for routine monitoring and management of this condition.  Please return to the hospital if you experience fever, chills, severe headache, dizziness, lightheadedness, loss of consciousness, visual disturbance, chest pain, palpitations, shortness of breath,  abdominal pain, nausea, vomiting, diarrhea, blood in your vomit/stool/urine, burning with urination or change in urinary pattern, numbness, weakness, tingling, or other alarming symptoms.      SECONDARY DISCHARGE DIAGNOSES  Diagnosis: Bacteremia  Assessment and Plan of Treatment: During your stay in the hospital we mary blood cultures. It came back positive for a bacteria : staphylococcus lugdunesis. This bacteria can be very harmful to the body and your organs. We treated you with IV antibiotics while you were in the hospital. We placed a midline (which is an IV line) so that you can reliably administer IV antibiotics. Please continue to treat this bloodstream infection with cefazolin 2 grams every 8 hours by IV. Please get your blood drawn every week so that it may be monitored while on IV antibiotics.   Please return to the hospital if you notice any redness, swelling, pain or warmth at the site of the midline or develop a fever, It may indicate an infection or your midline is not properly working.    Diagnosis: Deep vein thrombosis (DVT) of right upper extremity  Assessment and Plan of Treatment: It was found that you had blood clots present in your right arm. These blood clots are concerning because they can travel to your heart and to your lungs. We treated you with blood thinners while you were in the hospital. We increased your home dose of xarelto from 10 mg daily to 20 mg daily because that it is the optimal dose for management of a deep vein thrombosis. Please continue to take xarelto at 20 mg daily for the next 3-6 months. Follow up with Dr. Yoon for adjustment of this dose and for monitoring of improvement of your right upper extremity.      Diagnosis: H/O drainage of abscess  Assessment and Plan of Treatment: During your stay we drained an abscess present on your belly. It occurred due to the chronic implantation of your infusion pump. We cultured the wound abscess and it came back positive for a bacteria: MRSA. We treated this infection with oral antibiotics. Please continue to take your doxycycline two times a day until September 20th, 2022.    Diagnosis: Atrial thrombus  Assessment and Plan of Treatment: You had blood clots present in one of the chambers of your heart. Our interventional cardiologist was able to remove some of the clot, however some of the clot is still present. Continue to take xarelto 20 mg daily to manage this condition. Please follow with Dr. Chavis, your cardiologist for monitoring of this thrombus.    Diagnosis: Hematoma  Assessment and Plan of Treatment: It was found that you developed a hematoma (collection of blood) at the site of your vascular access for removal of the blood clot in your heart. Vascular surgery treated this hematoma with thrombin to help your blood clot and prevent progression. We repeated ultrasounds of this hematoma present in your femoral artery to make sure that it did not get larger. When it did get larger we stopped anticoagulation, however the course was complicated by blood clots found in your right arm. We restarted anticoagulation, and monitored your hemoglobin levels for any drops as well as repeated imaging of your hematoma. The hematoma appeared stable and consistent with prior imaging and your hemoglobin remained stable while on anticoagulation. We did a CT of your hematoma to look for signs of infection, however there was no sign of infection.

## 2022-09-13 NOTE — PROGRESS NOTE ADULT - ATTENDING COMMENTS
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 EDOUARD likely secondary to venous congestion- resolved.     Staph lugdunensis bacteremia- continue Ancef. Subcutaneous abscess secondary to device- I&D growing MRSA- on Doxycycline.     High risk for MYRIAM- deferred.     Worsening groin hematoma- heparin held initially, restarted 9/9 as found to have R cephalic v thrombosis. Hematoma stable on u/s yesterday- transitioned to Xarelto.     D/c planning for tomorrow.

## 2022-09-13 NOTE — DISCHARGE NOTE PROVIDER - DETAILS OF MALNUTRITION DIAGNOSIS/DIAGNOSES
This patient has been assessed with a concern for Malnutrition and was treated during this hospitalization for the following Nutrition diagnosis/diagnoses:     -  08/31/2022: Mild protein-calorie malnutrition

## 2022-09-13 NOTE — PROGRESS NOTE ADULT - NS ATTEND AMEND GEN_ALL_CORE FT
Agree with plan above  Appears well clinically, OOB to chair, off oxygen saturating well on RA  Plan to complete therapy for staph bacteremia  Continue PH regimen as per pulmonary  Continue full-dose AC  Counseled on weight loss as above  Will need pulmonary transplant clinic follow up at time of discharge in 2 weeks
Agree with plan above  Complete therapy for staph bacteremia  Continue PH regimen as per pulmonary  Continue full-dose AC  Encourage OOB to chair and ambulation daily  Counseled on weight loss as above  Will need pulmonary transplant clinic follow up at time of discharge
42 F with Group I/?IV pulm htn on Remodulin pump, PE on xarelto here with sob and palpitations, found to have acute on chronic cor pulmonale and acute on chronic hypoxic respiratory failure with shock, concern for clot in transit with attempted removal but stopped due to chronic appearance continues to improve overall, headache slightly better.  Abd pain improving.    - c/w dobutamine for now at 2.5  - Titrated off inhaled nitric. Started sildenafil 10mg yesterday, tolerated well, increased to 10 BID today.   - EDOUARD improving; goal to keep her net negative 500 cc as she still feels some fullness; diuresis prn  - c/w remodulin pump at 42 ng/kg/min; IV remodulin is not available; will try to transition to flolan   - Duplex demonstrating fistula, compression by vasc surg team overnight, plan for repeat doppler today and injection if still persistent.   - Heparin on hold and will be restarted after vascular clearance.     Critically ill patient requiring frequent bedside visits with therapy changes.
Clinically close to euvolemia.   Feels well.  PLan for home IV antibiotics. Probable DC tomorrow.   Follow up as output with Dr. Chavis.

## 2022-09-13 NOTE — PROGRESS NOTE ADULT - PROBLEM SELECTOR PLAN 2
- Staph lugdunesis 8/29 bcx, coagulase-negative staph treated in a similar manner to staph aureus  - wound culture - MRSA  - s/p I&D (9/8/22)  - BCx 9/7 - NGTD  - Per anesthesia, MYRIAM is high risk and needs to be done under general anesthesia with intubation. Per pulmonology, patient is high risk given her pulmonary hypertension and is not able to undergo procedure.   - CT A&P showing no evidence of abscess present in left leg hematoma.   - Cefazolin 2g q 8 x 4 week duration to be completed 10/5/22  - doxycycline 100 mg BID to be completed 9/20/22  - Midline to be removed by 10/6/22

## 2022-09-13 NOTE — PROGRESS NOTE ADULT - PROBLEM SELECTOR PLAN 1
- continue Lasix 20 mg PO QOD for maintenance of euvolemia  - continue Spironolactone 25 mg QD  - daily standing weight and strict I&Os  - will arrange for HF follow appointment in 1-2 weeks. Attending is Dr. Paramjit Chavis at [facility]

## 2022-09-13 NOTE — PROGRESS NOTE ADULT - TIME BILLING
Personal review of data, imaging and discussion with medical team.
reviewing chart and coordinating care with primary team/staff, as well as reviewing vitals, radiology, medication list, recent labs, and prior records.
reviewing chart and coordinating care with primary team/staff, as well as reviewing vitals, radiology, medication list, recent labs, and prior records.
- Generation of cardiovascular treatment plan.  - Coordination of care with primary team.

## 2022-09-13 NOTE — PROGRESS NOTE ADULT - PROBLEM SELECTOR PLAN 2
- followed by Dr. Yoon  - also followed by transplant pulmonary, to follow-up outpatient  - on Sildenafil 20 mg TID and Remodulin

## 2022-09-13 NOTE — PROGRESS NOTE ADULT - PROBLEM SELECTOR PLAN 3
RUE US showing DVT in a radial vein at the distal upper arm and cephalic vein thrombus at the proximal upper arm.  - starting xarelto 20 mg PO QD

## 2022-09-13 NOTE — PROGRESS NOTE ADULT - PROBLEM SELECTOR PLAN 5
- s/p L fem thrombin injection  - repeated US 9/12 without evidence of pseudoaneurysm of left groin though with 2 stable hematomas   - followed by vascular surgery

## 2022-09-13 NOTE — PROGRESS NOTE ADULT - SUBJECTIVE AND OBJECTIVE BOX
PROGRESS NOTE:   Authored by Alfonso Simons MD   Patient is a 42y old  Female who presents with a chief complaint of Palpitations (12 Sep 2022 18:39)      SUBJECTIVE / OVERNIGHT EVENTS: No acute events overnight. No chest pain,  palpitations, dyspnea, abdominal pain. nausea, vomiting, diarrhea, blurry vision, dysuria, lightheadedness, dizziness.    ADDITIONAL REVIEW OF SYSTEMS:    MEDICATIONS  (STANDING):  ceFAZolin   IVPB 2000 milliGRAM(s) IV Intermittent every 8 hours  ceFAZolin   IVPB      chlorhexidine 4% Liquid 1 Application(s) Topical <User Schedule>  cyanocobalamin 1000 MICROGram(s) Oral daily  doxycycline monohydrate Capsule 100 milliGRAM(s) Oral every 12 hours  furosemide    Tablet 20 milliGRAM(s) Oral <User Schedule>  lidocaine 1% Injectable 50 milliLiter(s) Local Injection once  multivitamin 1 Tablet(s) Oral daily  pantoprazole    Tablet 40 milliGRAM(s) Oral before breakfast  polyethylene glycol 3350 17 Gram(s) Oral every 12 hours  predniSONE   Tablet 10 milliGRAM(s) Oral daily  Remodulin (Treprostinil) 5mG/mL 1 Vial(s) 1 Vial(s) SubCutaneous Continuous Pump  rivaroxaban 20 milliGRAM(s) Oral with dinner  senna 2 Tablet(s) Oral at bedtime  sildenafil (REVATIO) 20 milliGRAM(s) Oral <User Schedule>  spironolactone 25 milliGRAM(s) Oral daily    MEDICATIONS  (PRN):  acetaminophen     Tablet .. 650 milliGRAM(s) Oral every 6 hours PRN Mild Pain (1 - 3)  aluminum hydroxide/magnesium hydroxide/simethicone Suspension 30 milliLiter(s) Oral every 4 hours PRN Dyspepsia  artificial  tears Solution 1 Drop(s) Both EYES three times a day PRN Dry Eyes  HYDROmorphone   Tablet 2 milliGRAM(s) Oral every 6 hours PRN Severe Pain (7 - 10)  ipratropium    for Nebulization 500 MICROGram(s) Nebulizer every 6 hours PRN Bronchospasm  melatonin 3 milliGRAM(s) Oral at bedtime PRN Insomnia  ondansetron Injectable 4 milliGRAM(s) IV Push every 8 hours PRN Nausea and/or Vomiting  sodium chloride 0.65% Nasal 1 Spray(s) Both Nostrils every 6 hours PRN Nasal Congestion  sodium chloride 0.9% lock flush 10 milliLiter(s) IV Push every 1 hour PRN Pre/post blood products, medications, blood draw, and to maintain line patency      CAPILLARY BLOOD GLUCOSE        I&O's Summary    12 Sep 2022 07:01  -  13 Sep 2022 07:00  --------------------------------------------------------  IN: 809 mL / OUT: 0 mL / NET: 809 mL        PHYSICAL EXAM:  Vital Signs Last 24 Hrs  T(C): 37.2 (13 Sep 2022 05:39), Max: 37.2 (13 Sep 2022 05:39)  T(F): 98.9 (13 Sep 2022 05:39), Max: 98.9 (13 Sep 2022 05:39)  HR: 105 (13 Sep 2022 05:39) (96 - 112)  BP: 112/78 (13 Sep 2022 05:39) (103/66 - 129/81)  BP(mean): --  RR: 18 (13 Sep 2022 05:39) (18 - 18)  SpO2: 97% (13 Sep 2022 05:39) (96% - 99%)    Parameters below as of 13 Sep 2022 05:39  Patient On (Oxygen Delivery Method): room air      PHYSICAL EXAM:  GENERAL: No apparent distress.   HEAD:  Atraumatic, Normocephalic  EYES: EOMI, PERRLA, conjunctiva and sclera clear  NECK: Supple, no lymphadenopathy, no JVD  CHEST/LUNG: Clear to auscultation bilateral and symmetric; No wheezes, rales, or rhonchi  HEART: S1 and S2 normal. Regular rate and rhythm; No murmurs, rubs, or gallops  ABDOMEN: Soft, non-tender, non-distended; normal bowel sounds. Clean, dry dressing on rt anterior abdominal wall.  EXTREMITIES: LLE hematoma near groin appears improved, soft, TTP. 2+ peripheral pulses b/l, No clubbing, cyanosis, or edema  NEUROLOGY: A&O x 3, no focal deficits  SKIN: No rashes or lesions      LABS:                        10.7   14.76 )-----------( 345      ( 12 Sep 2022 18:43 )             34.4     09-12    137  |  101  |  12  ----------------------------<  80  4.0   |  25  |  0.89    Ca    8.8      12 Sep 2022 07:27  Phos  4.4     09-12  Mg     1.8     09-12    TPro  6.7  /  Alb  3.4  /  TBili  0.6  /  DBili  x   /  AST  22  /  ALT  26  /  AlkPhos  51  09-12    PTT - ( 12 Sep 2022 18:43 )  PTT:51.6 sec            RADIOLOGY & ADDITIONAL TESTS:  Lab Results Reviewed   Imaging Reviewed  Electrocardiogram Reviewed   PROGRESS NOTE:   Authored by Alfonso Simons MD   Patient is a 42y old  Female who presents with a chief complaint of Palpitations (12 Sep 2022 18:39)      SUBJECTIVE / OVERNIGHT EVENTS: No acute events overnight. Telemetry NSR, sinus tach to 110. No chest pain,  palpitations, dyspnea, abdominal pain. nausea, vomiting, diarrhea, blurry vision, dysuria, lightheadedness, dizziness.    ADDITIONAL REVIEW OF SYSTEMS:    MEDICATIONS  (STANDING):  ceFAZolin   IVPB 2000 milliGRAM(s) IV Intermittent every 8 hours  ceFAZolin   IVPB      chlorhexidine 4% Liquid 1 Application(s) Topical <User Schedule>  cyanocobalamin 1000 MICROGram(s) Oral daily  doxycycline monohydrate Capsule 100 milliGRAM(s) Oral every 12 hours  furosemide    Tablet 20 milliGRAM(s) Oral <User Schedule>  lidocaine 1% Injectable 50 milliLiter(s) Local Injection once  multivitamin 1 Tablet(s) Oral daily  pantoprazole    Tablet 40 milliGRAM(s) Oral before breakfast  polyethylene glycol 3350 17 Gram(s) Oral every 12 hours  predniSONE   Tablet 10 milliGRAM(s) Oral daily  Remodulin (Treprostinil) 5mG/mL 1 Vial(s) 1 Vial(s) SubCutaneous Continuous Pump  rivaroxaban 20 milliGRAM(s) Oral with dinner  senna 2 Tablet(s) Oral at bedtime  sildenafil (REVATIO) 20 milliGRAM(s) Oral <User Schedule>  spironolactone 25 milliGRAM(s) Oral daily    MEDICATIONS  (PRN):  acetaminophen     Tablet .. 650 milliGRAM(s) Oral every 6 hours PRN Mild Pain (1 - 3)  aluminum hydroxide/magnesium hydroxide/simethicone Suspension 30 milliLiter(s) Oral every 4 hours PRN Dyspepsia  artificial  tears Solution 1 Drop(s) Both EYES three times a day PRN Dry Eyes  HYDROmorphone   Tablet 2 milliGRAM(s) Oral every 6 hours PRN Severe Pain (7 - 10)  ipratropium    for Nebulization 500 MICROGram(s) Nebulizer every 6 hours PRN Bronchospasm  melatonin 3 milliGRAM(s) Oral at bedtime PRN Insomnia  ondansetron Injectable 4 milliGRAM(s) IV Push every 8 hours PRN Nausea and/or Vomiting  sodium chloride 0.65% Nasal 1 Spray(s) Both Nostrils every 6 hours PRN Nasal Congestion  sodium chloride 0.9% lock flush 10 milliLiter(s) IV Push every 1 hour PRN Pre/post blood products, medications, blood draw, and to maintain line patency      CAPILLARY BLOOD GLUCOSE        I&O's Summary    12 Sep 2022 07:01  -  13 Sep 2022 07:00  --------------------------------------------------------  IN: 809 mL / OUT: 0 mL / NET: 809 mL        PHYSICAL EXAM:  Vital Signs Last 24 Hrs  T(C): 37.2 (13 Sep 2022 05:39), Max: 37.2 (13 Sep 2022 05:39)  T(F): 98.9 (13 Sep 2022 05:39), Max: 98.9 (13 Sep 2022 05:39)  HR: 105 (13 Sep 2022 05:39) (96 - 112)  BP: 112/78 (13 Sep 2022 05:39) (103/66 - 129/81)  BP(mean): --  RR: 18 (13 Sep 2022 05:39) (18 - 18)  SpO2: 97% (13 Sep 2022 05:39) (96% - 99%)    Parameters below as of 13 Sep 2022 05:39  Patient On (Oxygen Delivery Method): room air      PHYSICAL EXAM:  GENERAL: No apparent distress.   HEAD:  Atraumatic, Normocephalic  EYES: EOMI, PERRLA, conjunctiva and sclera clear  NECK: Supple, no lymphadenopathy, no JVD  CHEST/LUNG: Clear to auscultation bilateral and symmetric; No wheezes, rales, or rhonchi  HEART: S1 and S2 normal. Regular rate and rhythm; No murmurs, rubs, or gallops  ABDOMEN: Soft, non-tender, non-distended; normal bowel sounds. Clean, dry dressing on rt anterior abdominal wall.  EXTREMITIES: LLE hematoma near groin appears improved, soft, TTP. 2+ peripheral pulses b/l, No clubbing, cyanosis, or edema  NEUROLOGY: A&O x 3, no focal deficits  SKIN: No rashes or lesions      LABS:                        10.7   14.76 )-----------( 345      ( 12 Sep 2022 18:43 )             34.4     09-12    137  |  101  |  12  ----------------------------<  80  4.0   |  25  |  0.89    Ca    8.8      12 Sep 2022 07:27  Phos  4.4     09-12  Mg     1.8     09-12    TPro  6.7  /  Alb  3.4  /  TBili  0.6  /  DBili  x   /  AST  22  /  ALT  26  /  AlkPhos  51  09-12    PTT - ( 12 Sep 2022 18:43 )  PTT:51.6 sec            RADIOLOGY & ADDITIONAL TESTS:  Lab Results Reviewed   Imaging Reviewed  Electrocardiogram Reviewed

## 2022-09-13 NOTE — PROGRESS NOTE ADULT - ASSESSMENT
42F PMHx of pulmonary HTN (group I and group IV; on Treprostinil and Xarelto), home o2 (2L), ILD, JULIA, obesity, and PPM presents on  for pain, nausea and vomiting, found to be in profound RV failure s/p dobutamine, Marcela, and aggressive diuresis. Patient also noted to have RA clot in transit on TTE s/p mechanical thrombectomy which was complicated by left groin pseudoaneurysm s/p thrombin injection. Course complicated by EDOUARD likely cardiorenal which has improved.    TTE done which showed hyperdynamic and compressed LV, severe RV dysfunction, mod TR, dilated IVC. Repeat TTE with improved RV function however RA thrombus noted.   CTA:  marked enlargement of PA. No PE. 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    #Group I and IV pulmonary hypertension  #RV failure  #RA thrombus  #RUE radial and cephalic vein thrombi    Recommendations  -CW Sildenafil 20 mg po tid.   -CW Remodulin SQ pump at 42 ng/kg/min     -CW chronic prednisone at 10 mg po daily.   -CW ipratropium prn.   -Diuretic mgmt per HF. On lasix 20 mg po QOD.    -Being evaluated by transplant team.   -Aggressive physical therapy. PT/OT consult. PM&R. Out of bed to chair daily.   -Optimize nutrition. Consult nutrition.   -Vascular surgery following for left groin hematomas; serial groin exams; keep type and screen current; serial CBC.  -Cleared by vascular surgery to utilize full AC. Repeat doppler   -Risk outweight benefit of MYRIAM w/ general anesthesia at this time for Staph Lugdunensus bacteremia. On Cefazolin.   -Also with MRSA abscess from prior port site. On doxy.   -All teams contribution to care greatly appreciated    Patient should f/u with pulmonary, Dr. Yoon (pulmonary HTN) within 1-2 weeks of discharge. Please email [email or web address] and include patient's name,  and MRN and allow 24 hours for scheduling.    [address] [place] [address]  [phone]     Dr. Ramesh Echevarria, DO  Pulmonary and Critical Care Medicine Fellow   Available via Microsoft Teams - **Preferred**  Pulmonary Spectra #[id number] ([facility]) / [id number] ([facility])  Pager:  [phone] 42F PMHx of pulmonary HTN (group I and group IV; on Treprostinil and Xarelto), home o2 (2L), ILD, JULIA, obesity, and PPM presents on 8/28 for pain, nausea and vomiting, found to be in profound RV failure s/p dobutamine, Marcela, and aggressive diuresis. Patient also noted to have RA clot in transit on TTE s/p mechanical thrombectomy which was complicated by left groin pseudoaneurysm s/p thrombin injection. Course complicated by EDOUARD likely cardiorenal which has improved.    TTE done which showed hyperdynamic and compressed LV, severe RV dysfunction, mod TR, dilated IVC. Repeat TTE with improved RV function however RA thrombus noted.   CTA:  marked enlargement of PA. No PE. 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    #Group I and IV pulmonary hypertension  #RV failure  #RA thrombus  #RUE radial and cephalic vein thrombi    Recommendations  -CW Sildenafil 20 mg po tid.   -CW Remodulin SQ pump at 42 ng/kg/min     -CW chronic prednisone at 10 mg po daily.   -CW ipratropium prn.   -Diuretic mgmt per HF. On lasix 20 mg po QOD.    -Being evaluated by transplant team.   -Aggressive physical therapy. PT/OT consult. PM&R. Out of bed to chair daily.   -Optimize nutrition. Consult nutrition.   -Vascular surgery following for left groin hematomas; serial groin exams; keep type and screen current; serial CBC.  -Cleared by vascular surgery to utilize full AC. Repeat doppler   -Risk outweight benefit of MYRIAM w/ general anesthesia at this time for Staph Lugdunensus bacteremia. On Cefazolin.   -Also with MRSA abscess from prior port site. On doxy.   -All teams contribution to care greatly appreciated    Patient should f/u with pulmonary, Dr. Yoon (pulmonary HTN) on 9/27.      [address] [place] [address]  [phone]     Dr. Ramesh Echevarria,   Pulmonary and Critical Care Medicine Fellow   Available via Microsoft Teams - **Preferred**  Pulmonary Spectra #[id number] ([facility]) / [id number] ([facility])  Pager:  [phone]

## 2022-09-13 NOTE — PROGRESS NOTE ADULT - ASSESSMENT
42 year old woman with RVSD in the setting of pulmonary HTN (likely group I and IV), ILD, PE (on Xarelto), JULIA and obesity who presented with 2 days of palpitations and abdominal discomfort and nausea/emesis. Found to be in cardiogenic shock due to RV failure with markers of end organ dysfunction, started on  as well as Marcela. CTA negative for PE but TTE revealed RA thrombus with concern for clot in transit. She underwent catheter directed partial thrombus aspiration 8/31, course complicated by L fem pseudoaneurysm for which she received thrombin injection on 9/3. Course further complicated by S. Lugdunensis bacteremia on BCx from 8/29 s/p drainage of abscess at prior Remodulin subcutaneous access site.     Overall, Stage D CMP, clinically improved, weaned off inotropic support and Marcela, currently saturating well on Sildenafil and Remodulin. Pulmonary transplant team following but current barrier is elevated BMI. Otherwise from a HF perspective appears well compensated and euvolemic. Tentative plans for discharge tomorrow with plan for 4 week course of IV Cefazolin via midline.     Cardiac Studies  ·	Limited TTE 8/31 @16:57: preserved LVSF, RVE with decreased systolic function, thrombus seen in RA  ·	Limited TTE 8/31 @10:30: LVIDd 3.2 cm, hyperdynamic LV, new echogenic mass in RA 1.8 cm x 3.1 cm likely representing clot in transit (not seen on TTE from 8/29), RVE with decreased RV systolic function, est PASP 60 mmHg  ·	TTE 8/29: LVIDd 1.3 cm, normal LVSF, unable to asess LVEF given small cavity, flattening of interventricular septum consistent with RV overload, RVE with decreased systolic function (RV measuring 1.1 cm), severe TAJ (IAS bowed to L), PI end diastolic pressures 27 mmHg hence PA end diastolic pressure is at least 57 mmHg (TAPSE 1.1 cm), severe TR, est PASP 96 mmHg

## 2022-09-13 NOTE — PROGRESS NOTE ADULT - PROBLEM SELECTOR PLAN 5
- hyperdynamic LVSF, new echogenic mass in RA / clot s/p cath directed thrombus aspiration ib 9/3 c/b L common femoral artery pseudoaneurysm, 9/6 repeat duplex- found +hematoma w/o pseudoaneurysm f/u by vascular sx   - xarelto 20 mg PO D - hyperdynamic LVSF, new echogenic mass in RA / clot s/p cath directed thrombus aspiration ib 9/3 c/b L common femoral artery pseudoaneurysm, 9/6 repeat duplex- found +hematoma w/o pseudoaneurysm f/u by vascular sx   - xarelto 20 mg PO QD

## 2022-09-13 NOTE — DISCHARGE NOTE PROVIDER - NSFOLLOWUPCLINICS_GEN_ALL_ED_FT
[facility]  Hematology/Oncology  [address] [id number]  Phone: [phone]  Fax:   Follow Up Time: 1 month

## 2022-09-13 NOTE — PROGRESS NOTE ADULT - ASSESSMENT
42 yr old female with PMHx of PulmHTN class I/VI (dx 2014) on continuous Treprostinil (Remodulin) (sildenafil - held initially) c/b Rt heart failure s/p PPM (Dual chamber 2016), Chronic P.E. on rivaroxaban (dx 2017), SVT s/p ablation (5/2020), Asthma (chronic steroid use - prednisone), Fe deficiency anemia admitted on 8/28 for palpitations accompanied by orthopnea/N/V/RUQ abd pain over a 2 day period.  Hospital course c/b hypotension, EDOUARD, transaminitis due to RV sided heart failure requiring Marcela and inotropic support, found to have RA thrombus started on AC, s/p directed thrombus aspiration 8/31/22, c/b Left common femoral artery pseudoaneurysm s/p thormbin injection on 9/3. Patient consulted for lung transplant evaluation.    #pre lung transplant evaluation  -Overweight, BMI < 30 for lung transplant  -Wean off opiates  -[facility] lung transplant workup in 11/2020 with only barrier being overweight  -pHTN management by HF/pulmonary     #PE/dvt  -Heme consult for hypercoagulable w/u    #ID  staph bacteremia: on antibiotics    Follow up in lung transplant clinic at time of discharge   Rest of care per primary team    Above discussed with Dr. Wise 42 yr old female with PMHx of PulmHTN class I/VI (dx 2014) on continuous Treprostinil (Remodulin) (sildenafil - held initially) c/b Rt heart failure s/p PPM (Dual chamber 2016), Chronic P.E. on rivaroxaban (dx 2017), SVT s/p ablation (5/2020), Asthma (chronic steroid use - prednisone), Fe deficiency anemia admitted on 8/28 for palpitations accompanied by orthopnea/N/V/RUQ abd pain over a 2 day period.  Hospital course c/b hypotension, EDOUARD, transaminitis due to RV sided heart failure requiring Marcela and inotropic support, found to have RA thrombus started on AC, s/p directed thrombus aspiration 8/31/22, c/b Left common femoral artery pseudoaneurysm s/p thormbin injection on 9/3. Patient consulted for lung transplant evaluation.    #pre lung transplant evaluation  -Overweight, BMI < 30 for lung transplant, will get standing weight today  -[facility] lung transplant workup in 11/2020 with only barrier being overweight  -pHTN management by HF/pulmonary     #PE/dvt  -Heme consult for hypercoagulable w/u    #ID  staph bacteremia: on antibiotics    Follow up in lung transplant clinic at time of discharge (833) NEW-LUNG  Rest of care per primary team    Above discussed with Dr. Wise 42 yr old female with PMHx of PulmHTN class I/VI (dx 2014) on continuous Treprostinil (Remodulin) (sildenafil - held initially) c/b Rt heart failure s/p PPM (Dual chamber 2016), Chronic P.E. on rivaroxaban (dx 2017), SVT s/p ablation (5/2020), Asthma (chronic steroid use - prednisone), Fe deficiency anemia admitted on 8/28 for palpitations accompanied by orthopnea/N/V/RUQ abd pain over a 2 day period.  Hospital course c/b hypotension, EDOUARD, transaminitis due to RV sided heart failure requiring Marcela and inotropic support, found to have RA thrombus started on AC, s/p directed thrombus aspiration 8/31/22, c/b Left common femoral artery pseudoaneurysm s/p thormbin injection on 9/3. Patient consulted for lung transplant evaluation.    #pre lung transplant evaluation  -Overweight, BMI < 30 for lung transplant, will get standing weight today  -[facility] lung transplant workup in 11/2020 with only barrier being overweight  -pHTN management by HF/pulmonary     #?sleep apnea  -refer for sleep study as outpatient to rule out sleep apnea    #PE/dvt  -Heme consult for hypercoagulable w/u    #ID  staph bacteremia: on antibiotics    Follow up in lung transplant clinic at time of discharge (833) NEW-LUNG  Rest of care per primary team    Above discussed with Dr. Wise

## 2022-09-13 NOTE — PROGRESS NOTE ADULT - ATTENDING COMMENTS
42F with a h/o pulmonary HTN (group I and group IV; on Treprostinil and Xarelto), chronic hypoxic respiratory failure on supplemental oxygen at 2LPM, ILD, JULIA, obesity, and PPM presents on 8/28 for pain, nausea and vomiting, found to be in profound RV failure s/p dobutamine, Marcela, and aggressive diuresis. Patient also noted to have RA clot in transit on TTE s/p mechanical thrombectomy which was complicated by left groin pseudoaneurysm s/p thrombin injection. Course complicated by EDOUARD likely cardiorenal which has improved.    1. RV failure - improved   Repeat TTE with improved RV function however RA thrombus noted.   CTPA - marked enlargement of PA. No PE. B/L GGOs.   Continue with Sildenafil and Remodulin, diuresis    2. Coag negative staph bacteremia- being treat with Cephazolin, cellulitis/abscess s/p I&D with MRSA - on Doxy    MYRIAM deferred as she is high risk for anesthesia.      3. Left groin hematoma - repeat US 9/12 with no e/o pseudoaneurysm in the left groin. Two hematomas noted in the left femoral artery, essentially unchanged in size.    4. Thrombus in the radial vein and cephalic vein.    Transitioned to Xarelto    5. Chronic respiratory failure with hypoxia - c/w supplemental oxygen, goal O2 sats >92%    6. EDOUARD- improving    7. Dispo   Ambulate as tolerated- groin pain much better and able to walk without difficulty  Likely discharge home tomorrow and has follow up with Dr. Yoon in 2 weeks.     Agree with plan as outlined above.     Time-based billing (NON-critical care).     35 minutes spent on total encounter; more than 50% of the visit was spent counseling and / or coordinating care by the attending physician.  The necessity of the time spent during the encounter on this date of service was due to:     Personal review of data, imaging and discussion with patient and medical team.

## 2022-09-13 NOTE — PROGRESS NOTE ADULT - PROBLEM SELECTOR PLAN 1
Acute on chronic exacerbation of group I/IV pulmonary HTN with RHF  - ipratropium q6 PRN  - HOB > 30 degrees  - sildenafil 20 mg TID  - aldactone 25 mg PO QD  - Treprostinil SQ  - Pulmonary transplant clinic follow-up on outpatient  - f/u pulm recommendations  - f/u transplant surgery recommendations

## 2022-09-13 NOTE — DISCHARGE NOTE PROVIDER - NSDCCPTREATMENT_GEN_ALL_CORE_FT
PRINCIPAL PROCEDURE  Procedure: CT angiogram chest w contrast  Findings and Treatment: IMPRESSION:  No acute pulmonary embolus.  Severe dilatation of the pulmonary arteries representing pulmonary   hypertension is again noted. Bilateral groundglass opacities likely   manifestation of pulmonary hypertension are unchanged.        SECONDARY PROCEDURE  Procedure: Transthoracic echo  Findings and Treatment: Conclusions:  1. No mitral regurgitation seen.  2. LA volume index = 5 cc/m2. The left atrium appears very  small and contracted.  3. Mild concentricleft ventricular hypertrophy.Normal left  ventricular systolic function. No segmental wall motion  abnormalities. LV cavity appears small and contracted.  Flattening of the interventricular septum in both systole  and diastole is consistent with right ventricular pressure  overload.  Unable to assess LVEF since it is such a small cavity.  Visually, it is probably normal.  5. Severe right atrial enlargement. The IAS is bowed to the  left consistent with elevated right sided pressures.  6. Right ventricular enlargement with decreased right  ventricular systolic function. The RV is severely dilated.  RV is hypertrophied (measurin.1cm) A device wire is  noted in the right heart. PI end diastolic pressure was 37  mmHg hence PA end diastolic pressure is at least 57 mmHg.  TAPSE was 1.1 cm. RV S' was 7.2 cm/s.  7. Normal tricuspid valve. Severe tricuspid regurgitation.  Estimated pulmonary artery systolic pressure equals 96  mm  Hg, assuming right atrial pressure equals > 15 mm Hg,  consistent with severe pulmonary pressures.  8. Normal pulmonic valve. Moderate pulmonic regurgitation.  9. Small loculated pericardial effusion anterior to the  right ventricle.  *** Compared with echocardiogram of 2020, RV has  increased in size and RVfunction has decreased. There is  now severe tricuspid regurgitation. RVSP has increased and  PA end diastolilc pressure is high. There is a small  loculated pericardial effusion. The LV is very small and      Procedure: CT abdomen pelvis  Findings and Treatment: IMPRESSION:  Small right ventral abdominal wound with packing material with adjacent   skin thickening, mild subcutaneous fat stranding and foci of air.  Left ventral abdominal wall skin thickening and subcutaneous infiltration   with tiny air and fluid containing collection measuring 1.2 cm.  Bilateral ill-defined groin hematoma.  Pedunculated fatty lesion along the left anterior thigh. Correlate with   direct visualization.      Procedure: US arterial duplex LE LT  Findings and Treatment: FINDINGS:  Focused sonogram of the left groin demonstrates patency of the common   femoral artery and vein. Normal spectral wave forms are obtained. No   pseudoaneurysm or AV fistula is identified. Hematoma noted to be 4.7 x   1.8 x 3.0 cm, similar in size compared to prior ultrasound.  Another adjacent hematoma seen inferiorly measuring 5.9 x 1.6 x 4.2 cm.   No active flow into the hematomas noted.  IMPRESSION:  No pseudoaneurysm in the left groin.  Two hematomas noted in the left femoral artery, essentially unchanged in   size.

## 2022-09-13 NOTE — DISCHARGE NOTE NURSING/CASE MANAGEMENT/SOCIAL WORK - NSDCPEFALRISK_GEN_ALL_CORE
For information on Fall & Injury Prevention, visit: [email or web address] OR  [email or web address] OR  https://www.cdc.gov/steadi/patient.html

## 2022-09-13 NOTE — DISCHARGE NOTE NURSING/CASE MANAGEMENT/SOCIAL WORK - NSSCTYPOFSERV_GEN_ALL_CORE
Low tech - RN to assess for skilled needs, HHA, & P/T. Nurse will call to schedule visit.  High tech - RN to assist with IV medication & midline maintenance. First visit scheduled for 9/14 at 2pm.

## 2022-09-13 NOTE — DISCHARGE NOTE NURSING/CASE MANAGEMENT/SOCIAL WORK - PATIENT PORTAL LINK FT
You can access the FollowMyHealth Patient Portal offered by [facility] by registering at the following website: [email or web address]. By joining [facility]’s FollowMyHealth portal, you will also be able to view your health information using other applications (apps) compatible with our system.

## 2022-09-14 VITALS
DIASTOLIC BLOOD PRESSURE: 8 MMHG | OXYGEN SATURATION: 99 % | TEMPERATURE: 98 F | RESPIRATION RATE: 18 BRPM | HEART RATE: 99 BPM | SYSTOLIC BLOOD PRESSURE: 138 MMHG

## 2022-09-14 LAB
ANION GAP SERPL CALC-SCNC: 14 MMOL/L — SIGNIFICANT CHANGE UP (ref 5–17)
BUN SERPL-MCNC: 11 MG/DL — SIGNIFICANT CHANGE UP (ref 7–23)
CALCIUM SERPL-MCNC: 9.3 MG/DL — SIGNIFICANT CHANGE UP (ref 8.4–10.5)
CHLORIDE SERPL-SCNC: 100 MMOL/L — SIGNIFICANT CHANGE UP (ref 96–108)
CO2 SERPL-SCNC: 22 MMOL/L — SIGNIFICANT CHANGE UP (ref 22–31)
CREAT SERPL-MCNC: 0.91 MG/DL — SIGNIFICANT CHANGE UP (ref 0.5–1.3)
CULTURE RESULTS: SIGNIFICANT CHANGE UP
CULTURE RESULTS: SIGNIFICANT CHANGE UP
EGFR: 81 ML/MIN/1.73M2 — SIGNIFICANT CHANGE UP
GLUCOSE SERPL-MCNC: 84 MG/DL — SIGNIFICANT CHANGE UP (ref 70–99)
HCT VFR BLD CALC: 37 % — SIGNIFICANT CHANGE UP (ref 34.5–45)
HGB BLD-MCNC: 11.3 G/DL — LOW (ref 11.5–15.5)
MAGNESIUM SERPL-MCNC: 1.7 MG/DL — SIGNIFICANT CHANGE UP (ref 1.6–2.6)
MCHC RBC-ENTMCNC: 22.7 PG — LOW (ref 27–34)
MCHC RBC-ENTMCNC: 30.5 GM/DL — LOW (ref 32–36)
MCV RBC AUTO: 74.3 FL — LOW (ref 80–100)
NRBC # BLD: 0 /100 WBCS — SIGNIFICANT CHANGE UP (ref 0–0)
NT-PROBNP SERPL-SCNC: 231 PG/ML — SIGNIFICANT CHANGE UP (ref 0–300)
ORGANISM # SPEC MICROSCOPIC CNT: SIGNIFICANT CHANGE UP
PHOSPHATE SERPL-MCNC: 4.3 MG/DL — SIGNIFICANT CHANGE UP (ref 2.5–4.5)
PLATELET # BLD AUTO: 425 K/UL — HIGH (ref 150–400)
POTASSIUM SERPL-MCNC: 4.2 MMOL/L — SIGNIFICANT CHANGE UP (ref 3.5–5.3)
POTASSIUM SERPL-SCNC: 4.2 MMOL/L — SIGNIFICANT CHANGE UP (ref 3.5–5.3)
RBC # BLD: 4.98 M/UL — SIGNIFICANT CHANGE UP (ref 3.8–5.2)
RBC # FLD: 20.1 % — HIGH (ref 10.3–14.5)
SODIUM SERPL-SCNC: 136 MMOL/L — SIGNIFICANT CHANGE UP (ref 135–145)
SPECIMEN SOURCE: SIGNIFICANT CHANGE UP
SPECIMEN SOURCE: SIGNIFICANT CHANGE UP
WBC # BLD: 14.27 K/UL — HIGH (ref 3.8–10.5)
WBC # FLD AUTO: 14.27 K/UL — HIGH (ref 3.8–10.5)

## 2022-09-14 PROCEDURE — C1769: CPT

## 2022-09-14 PROCEDURE — 82330 ASSAY OF CALCIUM: CPT

## 2022-09-14 PROCEDURE — 82728 ASSAY OF FERRITIN: CPT

## 2022-09-14 PROCEDURE — 99285 EMERGENCY DEPT VISIT HI MDM: CPT

## 2022-09-14 PROCEDURE — 81025 URINE PREGNANCY TEST: CPT

## 2022-09-14 PROCEDURE — 97110 THERAPEUTIC EXERCISES: CPT

## 2022-09-14 PROCEDURE — 94640 AIRWAY INHALATION TREATMENT: CPT

## 2022-09-14 PROCEDURE — 80053 COMPREHEN METABOLIC PANEL: CPT

## 2022-09-14 PROCEDURE — 93005 ELECTROCARDIOGRAM TRACING: CPT

## 2022-09-14 PROCEDURE — 81001 URINALYSIS AUTO W/SCOPE: CPT

## 2022-09-14 PROCEDURE — 83605 ASSAY OF LACTIC ACID: CPT

## 2022-09-14 PROCEDURE — 87077 CULTURE AEROBIC IDENTIFY: CPT

## 2022-09-14 PROCEDURE — 87070 CULTURE OTHR SPECIMN AEROBIC: CPT

## 2022-09-14 PROCEDURE — 87186 SC STD MICRODIL/AGAR DIL: CPT

## 2022-09-14 PROCEDURE — 83735 ASSAY OF MAGNESIUM: CPT

## 2022-09-14 PROCEDURE — 85018 HEMOGLOBIN: CPT

## 2022-09-14 PROCEDURE — 93926 LOWER EXTREMITY STUDY: CPT

## 2022-09-14 PROCEDURE — 87640 STAPH A DNA AMP PROBE: CPT

## 2022-09-14 PROCEDURE — 84132 ASSAY OF SERUM POTASSIUM: CPT

## 2022-09-14 PROCEDURE — 82570 ASSAY OF URINE CREATININE: CPT

## 2022-09-14 PROCEDURE — 84100 ASSAY OF PHOSPHORUS: CPT

## 2022-09-14 PROCEDURE — 85014 HEMATOCRIT: CPT

## 2022-09-14 PROCEDURE — 82550 ASSAY OF CK (CPK): CPT

## 2022-09-14 PROCEDURE — C1887: CPT

## 2022-09-14 PROCEDURE — 87150 DNA/RNA AMPLIFIED PROBE: CPT

## 2022-09-14 PROCEDURE — 99239 HOSP IP/OBS DSCHRG MGMT >30: CPT

## 2022-09-14 PROCEDURE — 36415 COLL VENOUS BLD VENIPUNCTURE: CPT

## 2022-09-14 PROCEDURE — 0225U NFCT DS DNA&RNA 21 SARSCOV2: CPT

## 2022-09-14 PROCEDURE — 83880 ASSAY OF NATRIURETIC PEPTIDE: CPT

## 2022-09-14 PROCEDURE — 85027 COMPLETE CBC AUTOMATED: CPT

## 2022-09-14 PROCEDURE — 80048 BASIC METABOLIC PNL TOTAL CA: CPT

## 2022-09-14 PROCEDURE — 75825 VEIN X-RAY TRUNK: CPT | Mod: 59

## 2022-09-14 PROCEDURE — 82803 BLOOD GASES ANY COMBINATION: CPT

## 2022-09-14 PROCEDURE — 84295 ASSAY OF SERUM SODIUM: CPT

## 2022-09-14 PROCEDURE — 36569 INSJ PICC 5 YR+ W/O IMAGING: CPT

## 2022-09-14 PROCEDURE — 97530 THERAPEUTIC ACTIVITIES: CPT

## 2022-09-14 PROCEDURE — 87641 MR-STAPH DNA AMP PROBE: CPT

## 2022-09-14 PROCEDURE — 97116 GAIT TRAINING THERAPY: CPT

## 2022-09-14 PROCEDURE — 82435 ASSAY OF BLOOD CHLORIDE: CPT

## 2022-09-14 PROCEDURE — 87205 SMEAR GRAM STAIN: CPT

## 2022-09-14 PROCEDURE — 84540 ASSAY OF URINE/UREA-N: CPT

## 2022-09-14 PROCEDURE — 82947 ASSAY GLUCOSE BLOOD QUANT: CPT

## 2022-09-14 PROCEDURE — C1894: CPT

## 2022-09-14 PROCEDURE — 96374 THER/PROPH/DIAG INJ IV PUSH: CPT

## 2022-09-14 PROCEDURE — 84702 CHORIONIC GONADOTROPIN TEST: CPT

## 2022-09-14 PROCEDURE — 76705 ECHO EXAM OF ABDOMEN: CPT

## 2022-09-14 PROCEDURE — 87040 BLOOD CULTURE FOR BACTERIA: CPT

## 2022-09-14 PROCEDURE — 82553 CREATINE MB FRACTION: CPT

## 2022-09-14 PROCEDURE — 71045 X-RAY EXAM CHEST 1 VIEW: CPT

## 2022-09-14 PROCEDURE — C1757: CPT

## 2022-09-14 PROCEDURE — 93970 EXTREMITY STUDY: CPT

## 2022-09-14 PROCEDURE — 84484 ASSAY OF TROPONIN QUANT: CPT

## 2022-09-14 PROCEDURE — 71275 CT ANGIOGRAPHY CHEST: CPT

## 2022-09-14 PROCEDURE — 85610 PROTHROMBIN TIME: CPT

## 2022-09-14 PROCEDURE — 82805 BLOOD GASES W/O2 SATURATION: CPT

## 2022-09-14 PROCEDURE — 83550 IRON BINDING TEST: CPT

## 2022-09-14 PROCEDURE — 94799 UNLISTED PULMONARY SVC/PX: CPT

## 2022-09-14 PROCEDURE — 74177 CT ABD & PELVIS W/CONTRAST: CPT

## 2022-09-14 PROCEDURE — 83540 ASSAY OF IRON: CPT

## 2022-09-14 PROCEDURE — 93306 TTE W/DOPPLER COMPLETE: CPT

## 2022-09-14 PROCEDURE — 85025 COMPLETE CBC W/AUTO DIFF WBC: CPT

## 2022-09-14 PROCEDURE — 37252 INTRVASC US NONCORONARY 1ST: CPT

## 2022-09-14 PROCEDURE — 83690 ASSAY OF LIPASE: CPT

## 2022-09-14 PROCEDURE — 93308 TTE F-UP OR LMTD: CPT

## 2022-09-14 PROCEDURE — 37187 VENOUS MECH THROMBECTOMY: CPT

## 2022-09-14 PROCEDURE — U0003: CPT

## 2022-09-14 PROCEDURE — 36013 PLACE CATHETER IN ARTERY: CPT

## 2022-09-14 PROCEDURE — 87075 CULTR BACTERIA EXCEPT BLOOD: CPT

## 2022-09-14 PROCEDURE — 82565 ASSAY OF CREATININE: CPT

## 2022-09-14 PROCEDURE — 93321 DOPPLER ECHO F-UP/LMTD STD: CPT

## 2022-09-14 PROCEDURE — 93971 EXTREMITY STUDY: CPT

## 2022-09-14 PROCEDURE — 85730 THROMBOPLASTIN TIME PARTIAL: CPT

## 2022-09-14 PROCEDURE — 82962 GLUCOSE BLOOD TEST: CPT

## 2022-09-14 PROCEDURE — U0005: CPT

## 2022-09-14 PROCEDURE — 84443 ASSAY THYROID STIM HORMONE: CPT

## 2022-09-14 PROCEDURE — 97162 PT EVAL MOD COMPLEX 30 MIN: CPT

## 2022-09-14 PROCEDURE — C1751: CPT

## 2022-09-14 RX ORDER — SODIUM CHLORIDE 9 MG/ML
10 INJECTION INTRAMUSCULAR; INTRAVENOUS; SUBCUTANEOUS
Qty: 0 | Refills: 0 | DISCHARGE
Start: 2022-09-14

## 2022-09-14 RX ORDER — IPRATROPIUM BROMIDE 0.2 MG/ML
0 SOLUTION, NON-ORAL INHALATION
Qty: 0 | Refills: 0 | DISCHARGE

## 2022-09-14 RX ORDER — RIVAROXABAN 15 MG-20MG
1 KIT ORAL
Qty: 0 | Refills: 0 | DISCHARGE

## 2022-09-14 RX ORDER — FUROSEMIDE 40 MG
1 TABLET ORAL
Qty: 0 | Refills: 0 | DISCHARGE
Start: 2022-09-14

## 2022-09-14 RX ORDER — FUROSEMIDE 40 MG
1 TABLET ORAL
Qty: 15 | Refills: 0
Start: 2022-09-14 | End: 2022-10-13

## 2022-09-14 RX ORDER — IPRATROPIUM BROMIDE 0.2 MG/ML
2.5 SOLUTION, NON-ORAL INHALATION
Qty: 0 | Refills: 0 | DISCHARGE

## 2022-09-14 RX ORDER — FUROSEMIDE 40 MG
1 TABLET ORAL
Qty: 0 | Refills: 0 | DISCHARGE

## 2022-09-14 RX ORDER — FUROSEMIDE 40 MG
2 TABLET ORAL
Qty: 15 | Refills: 0 | DISCHARGE
Start: 2022-09-14 | End: 2022-10-13

## 2022-09-14 RX ORDER — SPIRONOLACTONE 25 MG/1
1 TABLET, FILM COATED ORAL
Qty: 30 | Refills: 0
Start: 2022-09-14 | End: 2022-10-13

## 2022-09-14 RX ORDER — RIVAROXABAN 15 MG-20MG
1 KIT ORAL
Qty: 30 | Refills: 1
Start: 2022-09-14 | End: 2022-11-12

## 2022-09-14 RX ORDER — HYDROMORPHONE HYDROCHLORIDE 2 MG/ML
1 INJECTION INTRAMUSCULAR; INTRAVENOUS; SUBCUTANEOUS
Qty: 28 | Refills: 0
Start: 2022-09-14 | End: 2022-09-20

## 2022-09-14 RX ADMIN — Medication 100 MILLIGRAM(S): at 00:52

## 2022-09-14 RX ADMIN — CHLORHEXIDINE GLUCONATE 1 APPLICATION(S): 213 SOLUTION TOPICAL at 05:40

## 2022-09-14 RX ADMIN — Medication 500 MICROGRAM(S): at 04:14

## 2022-09-14 RX ADMIN — Medication 100 MILLIGRAM(S): at 05:40

## 2022-09-14 RX ADMIN — Medication 10 MILLIGRAM(S): at 05:40

## 2022-09-14 RX ADMIN — Medication 650 MILLIGRAM(S): at 00:06

## 2022-09-14 RX ADMIN — Medication 100 MILLIGRAM(S): at 08:04

## 2022-09-14 RX ADMIN — Medication 20 MILLIGRAM(S): at 05:40

## 2022-09-14 NOTE — PROGRESS NOTE ADULT - SUBJECTIVE AND OBJECTIVE BOX
Lung Transplant Progress Note  =====================================================  HPI:  42F PMH PE on xarelto, pHTN, ILD, JULIA, right sided heart failure, presents to the hospital for 2 days of palpitations. Patient reports she has had similar episodes of palpitations in the past, however workup in the past was unremarkable. Patient also reporting mild orthopnea. Denies chest pain. Denies diaphoresis. Denies recent use of stimulants including caffeine. Patient also reporting severe nausea and vomiting which has resolved with zofran in the ED. ROS otherwise unremarkable.    ED course: VS with tachycardia. CBC with anemia. CMP normal. BNP 7646. Troponin T normal. EKG with sinus tachycardia, CXR without consolidation. CT chest noncon revealed pulmonary artery dilations likely secondary to pulmonary hypertension. Patient now for admission to medicine for further evaluation and treatment.  (28 Aug 2022 15:36)      24 hour events:     PAST MEDICAL & SURGICAL HISTORY:  Tachycardia      Anemia      Pulmonary hypertension      Pulmonary embolism      Asthma  On home O2 nightly at 2L via NC      PE (pulmonary thromboembolism)  on Xarelto 10 mg daily      Sinusitis      Corneal disorder      Right heart failure      CAD (coronary artery disease)      H/O pulmonary hypertension      Pulmonary embolism      Asthma      History of tachycardia      On supplemental oxygen by nasal cannula  O2 @ 2L      Pacemaker      Skin mass  left upper thigh mass      History of tubal ligation      Pacemaker      H/O tubal ligation      History of pacemaker  12/19 - [employer] model Ingevity 4469        Home Meds: Home Medications:  Atrovent 500 mcg/2.5 mL inhalation solution: 2.5 milliliter(s) inhaled every 6 hours, As Needed (14 Sep 2022 07:59)  Atrovent HFA 17 mcg/inh inhalation aerosol: puff(s) inhaled every 6 hours, As Needed (14 Sep 2022 07:59)  omeprazole 40 mg oral delayed release capsule: 1 cap(s) orally once a day (29 Aug 2022 12:31)  Remodulin 5 mg/mL injectable solution: patient is taking 42ng/kg/min via her own SQ PUMP (29 Aug 2022 12:31)  sodium chloride 0.9% injectable solution: 10 milliliter(s) injectable 6 times a day (14 Sep 2022 07:36)  Vitamin B12 1000 mcg oral tablet: 1 tab(s) orally once a day (29 Aug 2022 12:31)    Allergies: Allergies    adhesives (Rash)  penicillin (Rash)  vancomycin (Rash)    Intolerances    albuterol (Other; Rash)    Soc:   Advanced Directives: Presumed Full Code     ROS:    REVIEW OF SYSTEMS    [ ] A ten-point review of systems was otherwise negative except as noted.  [ ] Due to altered mental status/intubation, subjective information were not able to be obtained from the patient. History was obtained, to the extent possible, from review of the chart and collateral sources of information.      CURRENT MEDICATIONS:   --------------------------------------------------------------------------------------  Neurologic Medications  acetaminophen     Tablet .. 650 milliGRAM(s) Oral every 6 hours PRN Mild Pain (1 - 3)  HYDROmorphone   Tablet 2 milliGRAM(s) Oral every 6 hours PRN Severe Pain (7 - 10)  melatonin 3 milliGRAM(s) Oral at bedtime PRN Insomnia  ondansetron Injectable 4 milliGRAM(s) IV Push every 8 hours PRN Nausea and/or Vomiting    Respiratory Medications  ipratropium    for Nebulization 500 MICROGram(s) Nebulizer every 6 hours PRN Bronchospasm    Cardiovascular Medications  furosemide    Tablet 20 milliGRAM(s) Oral <User Schedule>  sildenafil (REVATIO) 20 milliGRAM(s) Oral <User Schedule>  spironolactone 25 milliGRAM(s) Oral daily    Gastrointestinal Medications  aluminum hydroxide/magnesium hydroxide/simethicone Suspension 30 milliLiter(s) Oral every 4 hours PRN Dyspepsia  cyanocobalamin 1000 MICROGram(s) Oral daily  multivitamin 1 Tablet(s) Oral daily  pantoprazole    Tablet 40 milliGRAM(s) Oral before breakfast  polyethylene glycol 3350 17 Gram(s) Oral every 12 hours  senna 2 Tablet(s) Oral at bedtime  sodium chloride 0.9% lock flush 10 milliLiter(s) IV Push every 1 hour PRN Pre/post blood products, medications, blood draw, and to maintain line patency    Genitourinary Medications    Hematologic/Oncologic Medications  rivaroxaban 20 milliGRAM(s) Oral with dinner    Antimicrobial/Immunologic Medications  ceFAZolin   IVPB 2000 milliGRAM(s) IV Intermittent every 8 hours  doxycycline monohydrate Capsule 100 milliGRAM(s) Oral every 12 hours    Endocrine/Metabolic Medications  predniSONE   Tablet 10 milliGRAM(s) Oral daily    Topical/Other Medications  artificial  tears Solution 1 Drop(s) Both EYES three times a day PRN Dry Eyes  chlorhexidine 4% Liquid 1 Application(s) Topical <User Schedule>  lidocaine 1% Injectable 50 milliLiter(s) Local Injection once  Remodulin (Treprostinil) 5mG/mL 1 Vial(s) 1 Vial(s) SubCutaneous Continuous Pump  sodium chloride 0.65% Nasal 1 Spray(s) Both Nostrils every 6 hours PRN Nasal Congestion    --------------------------------------------------------------------------------------    VITAL SIGNS, INS/OUTS (last 24 hours):  --------------------------------------------------------------------------------------  ICU Vital Signs Last 24 Hrs  T(C): 36.5 (14 Sep 2022 05:15), Max: 37.2 (14 Sep 2022 01:52)  T(F): 97.7 (14 Sep 2022 05:15), Max: 98.9 (14 Sep 2022 01:52)  HR: 103 (14 Sep 2022 05:15) (96 - 113)  BP: 110/74 (14 Sep 2022 05:15) (97/63 - 133/82)  BP(mean): --  ABP: --  ABP(mean): --  RR: 18 (14 Sep 2022 05:15) (18 - 18)  SpO2: 98% (14 Sep 2022 05:15) (96% - 99%)    O2 Parameters below as of 14 Sep 2022 05:15  Patient On (Oxygen Delivery Method): room air          I&O's Summary    13 Sep 2022 07:01  -  14 Sep 2022 07:00  --------------------------------------------------------  IN: 360 mL / OUT: 0 mL / NET: 360 mL      --------------------------------------------------------------------------------------    EXAM:  General/Neuro  RASS:   GCS:   Exam: Normal, NAD, alert, oriented x 3, no focal deficits.     Respiratory  Exam: Lungs **auscultation, Normal expansion/effort.    [] Tracheostomy   [] Intubated  Mechanical Ventilation:     Cardiovascular  Exam: S1, S2.  Regular rate and rhythm. * Peripheral edema    Cardiac Rhythm: Normal Sinus Rhythm  ECHO:     GI  Exam: Abdomen soft, Non-tender, Non-distended.      Tubes/Lines/Drains    [x] Peripheral IV  [] Central Venous Line     	[] R	[] L	[] IJ	[] Fem	[] SC        Type:	    Date Placed:   [] Arterial Line		[] R	[] L	[] Fem	[] Rad	[] Ax	Date Placed:   [] PICC:         	[] Midline		[] Mediport           [] Urinary Catheter		    Extremities  Exam: Extremities warm, pink, well-perfused.      Derm:  Exam: Good skin turgor, no skin breakdown.      :   Exam:     LABS  --------------------------------------------------------------------------------------  Labs:  CAPILLARY BLOOD GLUCOSE                              11.3   13.13 )-----------( 368      ( 13 Sep 2022 07:40 )             37.1         09-13    135  |  100  |  11  ----------------------------<  60<L>  4.4   |  23  |  0.79          LFTs:         Coags:     x      ----< x       ( 13 Sep 2022 08:16 )     35.9                      --------------------------------------------------------------------------------------    OTHER LABS    IMAGING RESULTS

## 2022-09-14 NOTE — PROGRESS NOTE ADULT - ASSESSMENT
42 yr old female with PMHx of PulmHTN class I/VI (dx 2014) on continuous Treprostinil (Remodulin) (sildenafil - held initially) c/b Rt heart failure s/p PPM (Dual chamber 2016), Chronic P.E. on rivaroxaban (dx 2017), SVT s/p ablation (5/2020), Asthma (chronic steroid use - prednisone), Fe deficiency anemia admitted on 8/28 for palpitations accompanied by orthopnea/N/V/RUQ abd pain over a 2 day period.  Hospital course c/b hypotension, EDOUARD, transaminitis due to RV sided heart failure requiring Marcela and inotropic support, found to have RA thrombus started on AC, s/p directed thrombus aspiration 8/31/22, c/b Left common femoral artery pseudoaneurysm s/p thormbin injection on 9/3. Patient consulted for lung transplant evaluation.    #pre lung transplant evaluation  -Overweight, BMI < 30 for lung transplant, will get standing weight today  -[facility] lung transplant workup in 11/2020 with only barrier being overweight  -pHTN management by HF/pulmonary     #?sleep apnea  -refer for sleep study as outpatient to rule out sleep apnea    #PE/dvt  -Heme consult for hypercoagulable w/u    #ID  staph bacteremia: on antibiotics    Follow up in lung transplant clinic at time of discharge ([phone]) NEW-LUNG  Rest of care per primary team    Above discussed with Dr. Wise

## 2022-09-14 NOTE — PROGRESS NOTE ADULT - SUBJECTIVE AND OBJECTIVE BOX
PULMONARY SERVICE FOLLOW UP CONSULT NOTE    SUBJECTIVE:  No acute complaints.     REVIEW OF SYSTEMS:  All additional ROS negative.    MEDICATIONS:  Pulmonary:  ipratropium    for Nebulization 500 MICROGram(s) Nebulizer every 6 hours PRN    Antimicrobials:  ceFAZolin   IVPB 2000 milliGRAM(s) IV Intermittent every 8 hours  doxycycline monohydrate Capsule 100 milliGRAM(s) Oral every 12 hours    Anticoagulants:  rivaroxaban 20 milliGRAM(s) Oral with dinner    Onc:    GI/:  aluminum hydroxide/magnesium hydroxide/simethicone Suspension 30 milliLiter(s) Oral every 4 hours PRN  pantoprazole    Tablet 40 milliGRAM(s) Oral before breakfast  polyethylene glycol 3350 17 Gram(s) Oral every 12 hours  senna 2 Tablet(s) Oral at bedtime    Endocrine:  predniSONE   Tablet 10 milliGRAM(s) Oral daily    Cardiac:  furosemide    Tablet 20 milliGRAM(s) Oral <User Schedule>  sildenafil (REVATIO) 20 milliGRAM(s) Oral <User Schedule>  spironolactone 25 milliGRAM(s) Oral daily    Other Medications:  acetaminophen     Tablet .. 650 milliGRAM(s) Oral every 6 hours PRN  artificial  tears Solution 1 Drop(s) Both EYES three times a day PRN  chlorhexidine 4% Liquid 1 Application(s) Topical <User Schedule>  cyanocobalamin 1000 MICROGram(s) Oral daily  HYDROmorphone   Tablet 2 milliGRAM(s) Oral every 6 hours PRN  lidocaine 1% Injectable 50 milliLiter(s) Local Injection once  melatonin 3 milliGRAM(s) Oral at bedtime PRN  multivitamin 1 Tablet(s) Oral daily  ondansetron Injectable 4 milliGRAM(s) IV Push every 8 hours PRN  Remodulin (Treprostinil) 5mG/mL 1 Vial(s) 1 Vial(s) SubCutaneous Continuous Pump  sodium chloride 0.65% Nasal 1 Spray(s) Both Nostrils every 6 hours PRN  sodium chloride 0.9% lock flush 10 milliLiter(s) IV Push every 1 hour PRN      PHYSICAL EXAM  Vital Signs Last 24 Hrs  T(C): 36.5 (14 Sep 2022 05:15), Max: 37.2 (14 Sep 2022 01:52)  T(F): 97.7 (14 Sep 2022 05:15), Max: 98.9 (14 Sep 2022 01:52)  HR: 103 (14 Sep 2022 05:15) (96 - 113)  BP: 110/74 (14 Sep 2022 05:15) (97/63 - 133/82)  BP(mean): --  RR: 18 (14 Sep 2022 05:15) (18 - 18)  SpO2: 98% (14 Sep 2022 05:15) (96% - 99%)    Parameters below as of 14 Sep 2022 05:15  Patient On (Oxygen Delivery Method): room air        09-13 @ 07:01  -  09-14 @ 07:00  --------------------------------------------------------  IN: 360 mL / OUT: 0 mL / NET: 360 mL            CONSTITUTIONAL: No acute distress.   HEENT:  Conjunctiva clear B/L.  Moist oral mucosa.   Cardiovascular: RRR with no murmurs. No JVD noted. No lower extremity edema B/L. Extremities are warm and well perfused.    Respiratory: Dec bs. No accessory muscle use.   Gastrointestinal:  Soft, nontender. Non-distended. Non-rigid.    Neurologic:  Alert and awake. Moving all extremities. Following commands.    Skin: Dressing overlaying abd. Induration noted in groin regions b/l. Tenderness improved from prior.         LABS:      CBC Full  -  ( 13 Sep 2022 07:40 )  WBC Count : 13.13 K/uL  RBC Count : 5.00 M/uL  Hemoglobin : 11.3 g/dL  Hematocrit : 37.1 %  Platelet Count - Automated : 368 K/uL  Mean Cell Volume : 74.2 fl  Mean Cell Hemoglobin : 22.6 pg  Mean Cell Hemoglobin Concentration : 30.5 gm/dL  Auto Neutrophil # : 9.48 K/uL  Auto Lymphocyte # : 2.39 K/uL  Auto Monocyte # : 0.80 K/uL  Auto Eosinophil # : 0.00 K/uL  Auto Basophil # : 0.46 K/uL  Auto Neutrophil % : 72.2 %  Auto Lymphocyte % : 18.2 %  Auto Monocyte % : 6.1 %  Auto Eosinophil % : 0.0 %  Auto Basophil % : 3.5 %    09-13    135  |  100  |  11  ----------------------------<  60<L>  4.4   |  23  |  0.79    Ca    8.9      13 Sep 2022 07:33  Phos  4.0     09-13  Mg     1.7     09-13      PTT - ( 13 Sep 2022 08:16 )  PTT:35.9 sec                  RADIOLOGY & ADDITIONAL STUDIES:

## 2022-09-14 NOTE — PROGRESS NOTE ADULT - SUBJECTIVE AND OBJECTIVE BOX
PROGRESS NOTE:   Authored by Alfonso Simons MD   Patient is a 42y old  Female who presents with a chief complaint of Palpitations (13 Sep 2022 17:08)      SUBJECTIVE / OVERNIGHT EVENTS: No acute events overnight. No chest pain,  palpitations, dyspnea, abdominal pain. nausea, vomiting, diarrhea, blurry vision, dysuria, lightheadedness, dizziness.    ADDITIONAL REVIEW OF SYSTEMS:    MEDICATIONS  (STANDING):  ceFAZolin   IVPB 2000 milliGRAM(s) IV Intermittent every 8 hours  chlorhexidine 4% Liquid 1 Application(s) Topical <User Schedule>  cyanocobalamin 1000 MICROGram(s) Oral daily  doxycycline monohydrate Capsule 100 milliGRAM(s) Oral every 12 hours  furosemide    Tablet 20 milliGRAM(s) Oral <User Schedule>  lidocaine 1% Injectable 50 milliLiter(s) Local Injection once  multivitamin 1 Tablet(s) Oral daily  pantoprazole    Tablet 40 milliGRAM(s) Oral before breakfast  polyethylene glycol 3350 17 Gram(s) Oral every 12 hours  predniSONE   Tablet 10 milliGRAM(s) Oral daily  Remodulin (Treprostinil) 5mG/mL 1 Vial(s) 1 Vial(s) SubCutaneous Continuous Pump  rivaroxaban 20 milliGRAM(s) Oral with dinner  senna 2 Tablet(s) Oral at bedtime  sildenafil (REVATIO) 20 milliGRAM(s) Oral <User Schedule>  spironolactone 25 milliGRAM(s) Oral daily    MEDICATIONS  (PRN):  acetaminophen     Tablet .. 650 milliGRAM(s) Oral every 6 hours PRN Mild Pain (1 - 3)  aluminum hydroxide/magnesium hydroxide/simethicone Suspension 30 milliLiter(s) Oral every 4 hours PRN Dyspepsia  artificial  tears Solution 1 Drop(s) Both EYES three times a day PRN Dry Eyes  HYDROmorphone   Tablet 2 milliGRAM(s) Oral every 6 hours PRN Severe Pain (7 - 10)  ipratropium    for Nebulization 500 MICROGram(s) Nebulizer every 6 hours PRN Bronchospasm  melatonin 3 milliGRAM(s) Oral at bedtime PRN Insomnia  ondansetron Injectable 4 milliGRAM(s) IV Push every 8 hours PRN Nausea and/or Vomiting  sodium chloride 0.65% Nasal 1 Spray(s) Both Nostrils every 6 hours PRN Nasal Congestion  sodium chloride 0.9% lock flush 10 milliLiter(s) IV Push every 1 hour PRN Pre/post blood products, medications, blood draw, and to maintain line patency      CAPILLARY BLOOD GLUCOSE        I&O's Summary    13 Sep 2022 07:01  -  14 Sep 2022 07:00  --------------------------------------------------------  IN: 0 mL / OUT: 0 mL / NET: 0 mL        PHYSICAL EXAM:  Vital Signs Last 24 Hrs  T(C): 36.5 (14 Sep 2022 05:15), Max: 37.2 (14 Sep 2022 01:52)  T(F): 97.7 (14 Sep 2022 05:15), Max: 98.9 (14 Sep 2022 01:52)  HR: 103 (14 Sep 2022 05:15) (96 - 113)  BP: 110/74 (14 Sep 2022 05:15) (97/63 - 133/82)  BP(mean): --  RR: 18 (14 Sep 2022 05:15) (18 - 18)  SpO2: 98% (14 Sep 2022 05:15) (96% - 99%)    Parameters below as of 14 Sep 2022 05:15  Patient On (Oxygen Delivery Method): room air        PHYSICAL EXAM:  GENERAL: No apparent distress.   HEAD:  Atraumatic, Normocephalic  EYES: EOMI, PERRLA, conjunctiva and sclera clear  NECK: Supple, no lymphadenopathy, no JVD  CHEST/LUNG: Clear to auscultation bilateral and symmetric; No wheezes, rales, or rhonchi  HEART: S1 and S2 normal. Regular rate and rhythm; No murmurs, rubs, or gallops  ABDOMEN: Soft, non-tender, non-distended; normal bowel sounds. Clean, dry dressing on rt anterior abdominal wall.  EXTREMITIES: LLE hematoma near groin appears improved, soft, TTP. 2+ peripheral pulses b/l, No clubbing, cyanosis, or edema  NEUROLOGY: A&O x 3, no focal deficits  SKIN: No rashes or lesions    LABS:                        11.3   13.13 )-----------( 368      ( 13 Sep 2022 07:40 )             37.1     09-13    135  |  100  |  11  ----------------------------<  60<L>  4.4   |  23  |  0.79    Ca    8.9      13 Sep 2022 07:33  Phos  4.0     09-13  Mg     1.7     09-13    TPro  6.7  /  Alb  3.4  /  TBili  0.6  /  DBili  x   /  AST  22  /  ALT  26  /  AlkPhos  51  09-12    PTT - ( 13 Sep 2022 08:16 )  PTT:35.9 sec            RADIOLOGY & ADDITIONAL TESTS:  Lab Results Reviewed   Imaging Reviewed  Electrocardiogram Reviewed

## 2022-09-14 NOTE — PROGRESS NOTE ADULT - ATTENDING COMMENTS
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 EDOUARD likely secondary to venous congestion- resolved.     Staph lugdunensis bacteremia- continue Ancef. Subcutaneous abscess secondary to device- I&D growing MRSA- on Doxycycline.     High risk for MYRIAM- deferred.     Worsening groin hematoma- heparin held initially, restarted 9/9 as found to have R cephalic v thrombosis. Hematoma stable on repeat u/s- transitioned to Xarelto.     D/c home today w f/u. D/c time 40 mins.

## 2022-09-14 NOTE — PROGRESS NOTE ADULT - PROBLEM SELECTOR PROBLEM 7
Prophylactic measure
Bacteremia
Prophylactic measure
Prophylactic measure
Medication management
Prophylactic measure

## 2022-09-14 NOTE — PROGRESS NOTE ADULT - PROBLEM SELECTOR PROBLEM 6
Elevated LFTs
Pseudoaneurysm of vascular access site
Pseudoaneurysm of vascular access site
Bacteremia
Palpitation
Elevated LFTs
Pseudoaneurysm of vascular access site
Elevated LFTs
Prophylactic measure
Anemia of chronic disease
Elevated LFTs
Prophylactic measure
Elevated LFTs
Elevated LFTs

## 2022-09-14 NOTE — PROGRESS NOTE ADULT - PROVIDER SPECIALTY LIST ADULT
Infectious Disease
[facility]
Pulmonology
Vascular Surgery
Infectious Disease
[facility]
Pulmonology
Heart Failure
Internal Medicine
[facility]
Pulmonology
Transplant Pulmonology
Transplant Pulmonology
Trauma Surgery
Infectious Disease
[facility]
Pulmonology
Surgery
Transplant Pulmonology
Heart Failure
Transplant Pulmonology
Transplant Pulmonology
Heart Failure
Hospitalist
Heart Failure
Internal Medicine
Heart Failure
Heart Failure
Internal Medicine
Heart Failure
Internal Medicine
Internal Medicine
Heart Failure
Internal Medicine
Infectious Disease

## 2022-09-14 NOTE — PROGRESS NOTE ADULT - PROBLEM SELECTOR PLAN 5
- hyperdynamic LVSF, new echogenic mass in RA / clot s/p cath directed thrombus aspiration ib 9/3 c/b L common femoral artery pseudoaneurysm, 9/6 repeat duplex- found +hematoma w/o pseudoaneurysm f/u by vascular sx   - xarelto 20 mg PO QD

## 2022-09-14 NOTE — PROGRESS NOTE ADULT - ASSESSMENT
42F PMHx of pulmonary HTN (group I and group IV; on Treprostinil and Xarelto), home o2 (2L), ILD, JULIA, obesity, and PPM presents on 8/28 for pain, nausea and vomiting, found to be in profound RV failure s/p dobutamine, Marcela, and aggressive diuresis. Patient also noted to have RA clot in transit on TTE s/p mechanical thrombectomy which was complicated by left groin pseudoaneurysm s/p thrombin injection. Course complicated by EDOUARD likely cardiorenal which has improved.    TTE done which showed hyperdynamic and compressed LV, severe RV dysfunction, mod TR, dilated IVC. Repeat TTE with improved RV function however RA thrombus noted.   CTA:  marked enlargement of PA. No PE. 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    #Group I and IV pulmonary hypertension  #RV failure  #RA thrombus  #RUE radial and cephalic vein thrombi    Recommendations  -CW Sildenafil 20 mg po tid.   -CW Remodulin SQ pump at 42 ng/kg/min     -CW chronic prednisone at 10 mg po daily.   -CW ipratropium prn.   -Diuretic mgmt per HF. On lasix 20 mg po QOD.    -Being evaluated by transplant team.   -Aggressive physical therapy. PT/OT consult. PM&R. Out of bed to chair daily.   -Optimize nutrition. Consult nutrition.   -Vascular surgery following for left groin hematomas; serial groin exams; keep type and screen current; serial CBC.  -Cleared by vascular surgery to utilize full AC. Repeat doppler   -Risk outweight benefit of MYRIAM w/ general anesthesia at this time for Staph Lugdunensus bacteremia. On Cefazolin.   -Also with MRSA abscess from prior port site. On doxy.   -All teams contribution to care greatly appreciate.  -Repeat labs and BNP pending. Please touch base w/ pulmonary prior to dc via teams chat.    Patient should f/u with pulmonary, Dr. Yoon (pulmonary HTN) on 9/27.      [address] [place] [address]  [phone]     Dr. Ramesh Echevarria, DO  Pulmonary and Critical Care Medicine Fellow   Available via [employer] Teams - **Preferred**  Pulmonary [facility] #[id number] ([facility]) / [id number] ([facility])  Pager:  [phone]

## 2022-09-14 NOTE — PROGRESS NOTE ADULT - PROBLEM SELECTOR PROBLEM 1
Pulmonary hypertension
Acute on chronic right-sided heart failure
Pulmonary hypertension
Palpitation
Pulmonary hypertension
Acute on chronic right-sided heart failure
Pulmonary hypertension
Acute on chronic right-sided heart failure
Pulmonary hypertension

## 2022-09-14 NOTE — PROGRESS NOTE ADULT - REASON FOR ADMISSION
Palpitations

## 2022-09-14 NOTE — PROGRESS NOTE ADULT - ATTENDING SUPERVISION STATEMENT
Fellow
Resident
Fellow
Resident
Resident
Fellow
Resident
Fellow
Resident

## 2022-09-14 NOTE — PROGRESS NOTE ADULT - NUTRITIONAL ASSESSMENT
This patient has been assessed with a concern for Malnutrition and has been determined to have a diagnosis/diagnoses of Mild protein-calorie malnutrition.    This patient is being managed with:   Diet Regular-  Supplement Feeding Modality:  Oral  Ensure Enlive Cans or Servings Per Day:  1       Frequency:  Daily  Entered: Aug 31 2022  2:44PM    
This patient has been assessed with a concern for Malnutrition and has been determined to have a diagnosis/diagnoses of Mild protein-calorie malnutrition.    This patient is being managed with:   Diet Regular-  Supplement Feeding Modality:  Oral  Ensure Enlive Cans or Servings Per Day:  1       Frequency:  Daily  Entered: Aug 31 2022  2:44PM    
This patient has been assessed with a concern for Malnutrition and has been determined to have a diagnosis/diagnoses of Mild protein-calorie malnutrition.    This patient is being managed with:   Diet Regular-  Entered: Sep  8 2022  4:25PM    
This patient has been assessed with a concern for Malnutrition and has been determined to have a diagnosis/diagnoses of Mild protein-calorie malnutrition.    This patient is being managed with:   Diet Regular-  Entered: Sep  8 2022  4:25PM    
This patient has been assessed with a concern for Malnutrition and has been determined to have a diagnosis/diagnoses of Mild protein-calorie malnutrition.    This patient is being managed with:   Diet Regular-  Supplement Feeding Modality:  Oral  Ensure Enlive Cans or Servings Per Day:  1       Frequency:  Daily  Entered: Aug 31 2022  2:44PM    
This patient has been assessed with a concern for Malnutrition and has been determined to have a diagnosis/diagnoses of Mild protein-calorie malnutrition.    This patient is being managed with:   Diet Regular-  Entered: Aug 28 2022  4:14PM    
This patient has been assessed with a concern for Malnutrition and has been determined to have a diagnosis/diagnoses of Mild protein-calorie malnutrition.    This patient is being managed with:   Diet Regular-  Entered: Sep  8 2022  4:25PM    
This patient has been assessed with a concern for Malnutrition and has been determined to have a diagnosis/diagnoses of Mild protein-calorie malnutrition.    This patient is being managed with:   Diet Regular-  Entered: Sep  8 2022  4:25PM    
This patient has been assessed with a concern for Malnutrition and has been determined to have a diagnosis/diagnoses of Mild protein-calorie malnutrition.    This patient is being managed with:   Diet Regular-  Supplement Feeding Modality:  Oral  Ensure Enlive Cans or Servings Per Day:  1       Frequency:  Daily  Entered: Aug 31 2022  2:44PM    
This patient has been assessed with a concern for Malnutrition and has been determined to have a diagnosis/diagnoses of Mild protein-calorie malnutrition.    This patient is being managed with:   Diet Regular-  Entered: Sep  8 2022  4:25PM    
This patient has been assessed with a concern for Malnutrition and has been determined to have a diagnosis/diagnoses of Mild protein-calorie malnutrition.    This patient is being managed with:   Diet Regular-  Entered: Sep  8 2022  4:25PM    
This patient has been assessed with a concern for Malnutrition and has been determined to have a diagnosis/diagnoses of Mild protein-calorie malnutrition.    This patient is being managed with:   Diet Regular-  Supplement Feeding Modality:  Oral  Ensure Enlive Cans or Servings Per Day:  1       Frequency:  Daily  Entered: Aug 31 2022  2:44PM    
This patient has been assessed with a concern for Malnutrition and has been determined to have a diagnosis/diagnoses of Mild protein-calorie malnutrition.    This patient is being managed with:   Diet Regular-  Entered: Sep  8 2022  4:25PM    
This patient has been assessed with a concern for Malnutrition and has been determined to have a diagnosis/diagnoses of Mild protein-calorie malnutrition.    This patient is being managed with:   Diet NPO after Midnight-     NPO Start Date: 08-Sep-2022   NPO Start Time: 23:59  Except Medications  Entered: Sep  8 2022 11:48AM    Diet Regular-  Supplement Feeding Modality:  Oral  Ensure Enlive Cans or Servings Per Day:  1       Frequency:  Daily  Entered: Aug 31 2022  2:44PM    
This patient has been assessed with a concern for Malnutrition and has been determined to have a diagnosis/diagnoses of Mild protein-calorie malnutrition.    This patient is being managed with:   Diet NPO after Midnight-     NPO Start Date: 07-Sep-2022   NPO Start Time: 23:59  Except Medications  With Ice Chips/Sips of Water  Entered: Sep  7 2022  9:10PM    Diet Regular-  Supplement Feeding Modality:  Oral  Ensure Enlive Cans or Servings Per Day:  1       Frequency:  Daily  Entered: Aug 31 2022  2:44PM    
This patient has been assessed with a concern for Malnutrition and has been determined to have a diagnosis/diagnoses of Mild protein-calorie malnutrition.    This patient is being managed with:   Diet NPO after Midnight-     NPO Start Date: 08-Sep-2022   NPO Start Time: 23:59  Except Medications  Entered: Sep  8 2022 11:48AM    Diet Regular-  Supplement Feeding Modality:  Oral  Ensure Enlive Cans or Servings Per Day:  1       Frequency:  Daily  Entered: Aug 31 2022  2:44PM    
This patient has been assessed with a concern for Malnutrition and has been determined to have a diagnosis/diagnoses of Mild protein-calorie malnutrition.    This patient is being managed with:   Diet Regular-  Entered: Sep  8 2022  4:25PM

## 2022-09-15 ENCOUNTER — APPOINTMENT (OUTPATIENT)
Dept: PULMONOLOGY | Facility: CLINIC | Age: 42
End: 2022-09-15

## 2022-09-15 ENCOUNTER — NON-APPOINTMENT (OUTPATIENT)
Age: 42
End: 2022-09-15

## 2022-09-15 PROCEDURE — 99443: CPT

## 2022-09-15 NOTE — HISTORY OF PRESENT ILLNESS
[Home] : at home, [unfilled] , at the time of the visit. [Medical Office: ([facility])___] : at the medical office located in  [Verbal consent obtained from patient] : the patient, [unfilled] [TextBox_4] : ------Patient is here for follow up of her pulmonary htn. ------------------INITIALLY  REFD  WITH   ECHO [DONE  OUTSIDE ] ELEVATED PASP  DILATED RV AND RA---------  HAS BEEN TOLD IN THE PAST SHE HAS  ASTHMA-----long-standing history of dyspnea on exertion-----------------has baseline tachycardia ----.....No history of fever, chills , rigors, chest pain, or hemoptysis. No h/o significant weight loss in last few months. No history of liver dysfunction , collagen vascular disorder or chronic thromboembolic disease. I would classify her dyspnea as WHO  FUNCTIONAL CLASS III-----------no calf tenderness----------SLEEP STUDY SHOWS AHI 0.6 but T 90  < 35 %-----DIAGNOSED  AS PAH ---------WAS ON ADMAPAS  NOW ON   SQ REMODULIN  [ SINCE MAY 2108]  ---S/P PACEMAKER PLACEMENT AT [place]  AND ON METOPROLOL-----\par \par \par 2/3/2022 tested positive on home covid test 2/2/2022- developed shortness of breath, nausea and vomitting- son is positive\par She is vaccinated for covid but not boosted\par Afebrile, drinking but not eating well. O2 sat 94% room air rest- 92% room air w/walk\par \par 2/7/2022 covid pcr negative - did not get MAB\par still with SOUZA - on prednisone with taper\par \par 4/2022 6mwt o2 sat 95% to 94% on oxygen 69meters (135m) stopped d/t severe SOB and elevated HR\par \par 4/15/2022  pulm htn - on remodulin 36 ng (having jaw pain sometimes), sildenafil 10 mg qd, furosemide 20 mg 5x weekly which helped w/edema. She is also having palpitations- has not yet followed up with DR Chavis in cardiology.\par Asthma-occas wheezing- awaiting start of dupixent\par Up to date w/covid vac- declines influenza vac. s/p consult with lung transplant team but needs to lower BMI before being considered- pt has been referred to nutritionist but has not scheduled appt\par \par \par 5/31/22- pulm htn - on remodulin 36 ng SQ (having jaw pain sometimes), sildenafil 10 mg qd, and diuretics daily- doing well from pulm perspectives\par Asthma currently under control- declining biologic therapy\par Was following lung  transplant team but currently on hold d/t BMI\par \par \par 7/11/22- pulm htn--on remodulin 36 ng SQ (having jaw pain sometimes), sildenafil 10 mg qd and diuretics daily [LASIX WO MG---XARELTO \par was in the hospital at [facility]-- given Lasix --- we will slowly increase her dose of Remodulin\par \par 7/22/2022 pulm htn- breathing improved on higher dose of remodulin 40ng, having some diarrhea. Palpitations improved\par \par 9/2022 Found to be in\par s/p hospitalization for cardiogenic shock due to RV failure with markers of end organ dysfunction,\par started on  as well as Marcela. CTA negative for PE but TTE revealed RA thrombus\par with concern for clot in transit. She underwent catheter directed partial\par thrombus aspiration 8/31, course complicated by L fem pseudoaneurysm for which\par she received thrombin injection on 9/3. Course further complicated by S.\par Lugdunensis bacteremia on BCx from 8/29 s/p drainage of abscess at prior----on IV antibiotics as per ID\par Remodulin subcutaneous access site.--42 ng/kg/min , also on sildenafil 20 mg 3 times daily\par ----\par

## 2022-09-15 NOTE — DISCUSSION/SUMMARY
[FreeTextEntry1] : ----Assessment and Plan--------41 year-old lady with PULMONARY ARTERIAL HYPERTENSION S/P PPM FOR TACHYCARDIA AT [place] ON REMODULIN - S/P LEFT BREAST BIOPSY \par  doing well on remodulin 38 ng ----- ON XARELTO, LASIX AND ALDACTONE -------LOW DOSE PREDNISONE -----is following up with at [facility] for transplant listing-----final decision after the  and psych evaluation\par \par \par 1] PULM ART HTN --------- WHO GROUP I FUNCTIONAL CLASS III ----[ AS PER DR FREEMAN WHO DID PULM ANGIO SHE HAS FEATURES OF GROUP I WITH SOME EVIDENCE OF DISTAL CLOTS] She is on remodulin 42 ng/kg/min  -\par Continue  sildenafil 20mg qd, xarelto, ON LASIX 20 mg 5 TIMES A WEEK AND ALDACTONE 25 MG \par 2) Asthma -. use  ipratropium and levalbuterol and budesonide. will start DUPIXENT as has been steroid dependent likely contributing to her weight-- She is hesitant to start biologic injections-  Continue FLONASE AND DYMYSTA NASAL SPRAY---\par 3) high BMI which  is precluding lung transplant. ---referred to nutritionist-/wt loss program-\par 3) oxygen medically necessary given severe Pulm Htn, anemia and h/o nocturnal desaturation which could worsen Pulm HTN. ADvised oxygen 2 lpm x 24hrs\par 4) -She is on xarelto---will be on lifelong anticoagulation---.\par 5) --S/P PPM- follows EP clinic at [place] ----NOW  DR AMBAR HILTON ---- \par 6) chronic palpitations- -- NEEDS TO SEE Dr AMBAR HILTON - heart failure clinic as soon as possible\par 7) she has features of ERICA- including sleep disturbance, nocturnal desat, and excessive fatigue- HST was negative for ERICA in 2014 but had desaturation. Will try to repeat HST at next visit\par 8) follows /[place] [facility] Also consulted with Dr Lou at [facility]- needs to lose wt to be considered for transplant listing---\par 9) Anemia:. Will monitor for need for transfusions/iron\par 10) s/p hospitalization august 2022----- for cardiogenic shock due to RV failure with markers of end organ dysfunction,---------. She underwent catheter directed partial\par thrombus aspiration 8/31, course complicated by L fem pseudoaneurysm for which\par she received thrombin injection on 9/3. Course further complicated by S.\par Lugdunensis bacteremia on BCx from 8/29 s/p drainage of abscess at prior----on IV antibiotics as per ID\par 11 left   fem pseudoaneurysm---needs follow up by  vasular \par 11)  f/u in 1 month\par \par \par \par Vivian Yoon MD, FCCP \par Director, Pulmonary Hypertension Program \par [facility] \par Division of Pulmonary, Critical Care and Sleep Medicine \par  Professor of Medicine \par [employer]\par \par \par VANDANA Harrell-C\par \par \par \par \par \par . \par \par  \par \par

## 2022-09-21 ENCOUNTER — NON-APPOINTMENT (OUTPATIENT)
Age: 42
End: 2022-09-21

## 2022-09-21 ENCOUNTER — APPOINTMENT (OUTPATIENT)
Dept: HEART FAILURE | Facility: CLINIC | Age: 42
End: 2022-09-21

## 2022-09-21 VITALS
OXYGEN SATURATION: 97 % | BODY MASS INDEX: 42.21 KG/M2 | SYSTOLIC BLOOD PRESSURE: 136 MMHG | HEART RATE: 90 BPM | HEIGHT: 60 IN | DIASTOLIC BLOOD PRESSURE: 90 MMHG | WEIGHT: 215 LBS

## 2022-09-21 DIAGNOSIS — R06.02 SHORTNESS OF BREATH: ICD-10-CM

## 2022-09-21 DIAGNOSIS — I50.810 RIGHT HEART FAILURE, UNSPECIFIED: ICD-10-CM

## 2022-09-21 DIAGNOSIS — Z79.52 LONG TERM (CURRENT) USE OF SYSTEMIC STEROIDS: ICD-10-CM

## 2022-09-21 PROCEDURE — 93000 ELECTROCARDIOGRAM COMPLETE: CPT

## 2022-09-21 PROCEDURE — 99214 OFFICE O/P EST MOD 30 MIN: CPT | Mod: 25

## 2022-09-21 RX ORDER — AZELASTINE HYDROCHLORIDE 205.5 UG/1
0.15 SPRAY, METERED NASAL
Qty: 1 | Refills: 2 | Status: DISCONTINUED | COMMUNITY
Start: 2021-12-23 | End: 2022-09-21

## 2022-09-21 RX ORDER — BLOOD-GLUCOSE METER
KIT MISCELLANEOUS
Qty: 1 | Refills: 0 | Status: ACTIVE | COMMUNITY
Start: 2022-09-21 | End: 1900-01-01

## 2022-09-21 RX ORDER — POTASSIUM CHLORIDE 750 MG/1
10 TABLET, FILM COATED, EXTENDED RELEASE ORAL DAILY
Qty: 30 | Refills: 4 | Status: DISCONTINUED | COMMUNITY
Start: 2021-12-23 | End: 2022-09-21

## 2022-09-21 RX ORDER — PREDNISONE 2.5 MG/1
2.5 TABLET ORAL
Qty: 60 | Refills: 5 | Status: DISCONTINUED | COMMUNITY
Start: 2020-10-13 | End: 2022-09-21
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
## 2022-09-21 NOTE — PHYSICAL EXAM
[Well Nourished] : well nourished [Obese] : obese [Normal Conjunctiva] : normal conjunctiva [No Edema] : no edema [Normal] : alert and oriented, normal memory [Normal S1, S2] : normal S1, S2 [Clear Lung Fields] : clear lung fields [de-identified] : JVP 6 cm  [de-identified] : Left chest PPM [de-identified] : not using  2 L nasal cannula continuous [de-identified] : softly distended [de-identified] : slow gait, useds wheelchair for long distances, discomfort right shoulder with ROM [de-identified] : no swelling of hands or lower extremities

## 2022-09-21 NOTE — CARDIOLOGY SUMMARY
[de-identified] : 9/21/22 NSR 87, RBBB, RAD, NSST\par 6/10/22 sinus tachycardia 109, RAD, TAJ, RBBB, TWI [de-identified] : 8/2022  TTE revealed RA thrombus with concern for clot in transit. She underwent catheter directed partial\par thrombus aspiration 8/31, \par Limited TTE 8/31 @10:30: LVIDd 3.2 cm, hyperdynamic LV, new echogenic mass in\par RA 1.8 cm x 3.1 cm likely representing clot in transit (not seen on TTE from\par 8/29), RVE with decreased RV systolic function, est PASP 60 mmHg\par TTE 8/29: LVIDd 1.3 cm, normal LVSF, unable to asess LVEF given small cavity,\par flattening of interventricular septum consistent with RV overload, RVE with\par decreased systolic function (RV measuring 1.1 cm), severe TAJ (IAS bowed to L),\par PI end diastolic pressures 27 mmHg hence PA end diastolic pressure is at least\par 57 mmHg (TAPSE 1.1 cm), severe TR, est PASP 96 mmHg\par \par 11/2/20 - LVEF 65-70%, LIVDd 2.6, RV enlargement with decreased RV systolic function, mild-mod TR RVSP 34 (likely underestimated), D shaped septum in systole/diastole, mod pulmonic insufficiency, IVC small and collapsed  [de-identified] : 9/1/22 Cath for venous thrombectomy right arm \par RHC 7/23/20 RA 4, RV 80/4, PA 80/40/57, PCWP 22, LVEDP 4; CO/CI 4/2.0; PVR 11 (using EDP 13).
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
## 2022-09-26 NOTE — DOWNTIME INTERRUPTION NOTE - TIME SYSTEM AVAILABLE AGAIN
Rafia.     
20-May-2018 03:00
no history of blood product transfusion

## 2022-09-27 ENCOUNTER — MED ADMIN CHARGE (OUTPATIENT)
Age: 42
End: 2022-09-27

## 2022-09-27 ENCOUNTER — APPOINTMENT (OUTPATIENT)
Dept: PULMONOLOGY | Facility: CLINIC | Age: 42
End: 2022-09-27

## 2022-09-27 VITALS
HEART RATE: 93 BPM | WEIGHT: 213 LBS | TEMPERATURE: 97.5 F | SYSTOLIC BLOOD PRESSURE: 153 MMHG | DIASTOLIC BLOOD PRESSURE: 108 MMHG | HEIGHT: 60 IN | BODY MASS INDEX: 41.82 KG/M2 | RESPIRATION RATE: 15 BRPM | OXYGEN SATURATION: 93 %

## 2022-09-27 PROCEDURE — 90686 IIV4 VACC NO PRSV 0.5 ML IM: CPT

## 2022-09-27 PROCEDURE — G0008: CPT

## 2022-09-27 PROCEDURE — 99215 OFFICE O/P EST HI 40 MIN: CPT | Mod: 25

## 2022-09-27 RX ORDER — DUPILUMAB 300 MG/2ML
300 INJECTION, SOLUTION SUBCUTANEOUS
Qty: 2 | Refills: 3 | Status: DISCONTINUED | COMMUNITY
Start: 2021-10-07 | End: 2022-09-27

## 2022-09-27 NOTE — HISTORY OF PRESENT ILLNESS
[Home] : at home, [unfilled] , at the time of the visit. [Medical Office: ([facility])___] : at the medical office located in  [Verbal consent obtained from patient] : the patient, [unfilled] [TextBox_4] : ------Patient is here for follow up of her pulmonary htn. ------------------INITIALLY  REFD  WITH   ECHO [DONE  OUTSIDE ] ELEVATED PASP  DILATED RV AND RA---------  HAS BEEN TOLD IN THE PAST SHE HAS  ASTHMA-----long-standing history of dyspnea on exertion-----------------has baseline tachycardia ----.....No history of fever, chills , rigors, chest pain, or hemoptysis. No h/o significant weight loss in last few months. No history of liver dysfunction , collagen vascular disorder or chronic thromboembolic disease. I would classify her dyspnea as WHO  FUNCTIONAL CLASS III-----------no calf tenderness----------SLEEP STUDY SHOWS AHI 0.6 but T 90  < 35 %-----DIAGNOSED  AS PAH ---------WAS ON ADMAPAS  NOW ON   SQ REMODULIN  [ SINCE MAY 2108]  ---S/P PACEMAKER PLACEMENT AT [place]  AND ON METOPROLOL-----\par \par \par 2/3/2022 tested positive on home covid test 2/2/2022- developed shortness of breath, nausea and vomitting- son is positive\par She is vaccinated for covid but not boosted\par Afebrile, drinking but not eating well. O2 sat 94% room air rest- 92% room air w/walk\par \par 2/7/2022 covid pcr negative - did not get MAB\par still with SOUZA - on prednisone with taper\par \par 4/2022 6mwt o2 sat 95% to 94% on oxygen 69meters (135m) stopped d/t severe SOB and elevated HR\par \par 4/15/2022  pulm htn - on remodulin 36 ng (having jaw pain sometimes), sildenafil 10 mg qd, furosemide 20 mg 5x weekly which helped w/edema. She is also having palpitations- has not yet followed up with DR Chavis in cardiology.\par Asthma-occas wheezing- awaiting start of dupixent\par Up to date w/covid vac- declines influenza vac. s/p consult with lung transplant team but needs to lower BMI before being considered- pt has been referred to nutritionist but has not scheduled appt\par \par \par 5/31/22- pulm htn - on remodulin 36 ng SQ (having jaw pain sometimes), sildenafil 10 mg qd, and diuretics daily- doing well from pulm perspectives\par Asthma currently under control- declining biologic therapy\par Was following lung  transplant team but currently on hold d/t BMI\par \par \par 7/11/22- pulm htn--on remodulin 36 ng SQ (having jaw pain sometimes), sildenafil 10 mg qd and diuretics daily [LASIX WO MG---XARELTO \par was in the hospital at [facility]-- given Lasix --- we will slowly increase her dose of Remodulin\par \par 7/22/2022 pulm htn- breathing improved on higher dose of remodulin 40ng, having some diarrhea. Palpitations improved\par \par 9/15 /2022 ----s/p hospitalization for cardiogenic shock due to RV failure with markers of end organ dysfunction,\par started on  as well as Marcela. CTA negative for PE but TTE revealed RA thrombus\par with concern for clot in transit. She underwent catheter directed partial\par thrombus aspiration 8/31, course complicated by L fem pseudoaneurysm for which\par she received thrombin injection on 9/3. Course further complicated by S.\par Lugdunensis bacteremia on BCx from 8/29 s/p drainage of abscess at prior----on IV antibiotics as per ID\par Remodulin subcutaneous access site.--42 ng/kg/min , also on sildenafil 20 mg 3 times daily\par ----\par \par \par SEPT 27 2022-----feels much better on subcu Remodulin 44 ng/kg/min and sildenafil 20 mg 3 times daily on diuretics-on anticoagulation-------- needs to see vascular for left femoral pseudoaneurysm--- H/O---S.Lugdunensis bacteremia on BCx from 8/29 s/p drainage of abscess at prior----on IV antibiotics as per ID --------ALSO HAS Lleft   fem pseudoaneurysm---needs follow up by  vasular ---- --received flu vaccine today------------------------

## 2022-09-27 NOTE — DISCUSSION/SUMMARY
[FreeTextEntry1] : ----Assessment and Plan--------41 year-old lady with PULMONARY ARTERIAL HYPERTENSION S/P PPM FOR TACHYCARDIA AT [place] ON REMODULIN - S/P LEFT BREAST BIOPSY \par  doing well on remodulin 38 ng ----- ON XARELTO, LASIX AND ALDACTONE -------LOW DOSE PREDNISONE -----is following up with at [facility] for transplant listing-----final decision after the  and psych evaluation\par \par \par 1] PULM ART HTN --------- WHO GROUP I FUNCTIONAL CLASS III ----[ AS PER DR FREEMAN WHO DID PULM ANGIO SHE HAS FEATURES OF GROUP I WITH SOME EVIDENCE OF DISTAL CLOTS] She is on remodulin 44 ng/kg/min  -\par Continue  sildenafil 20mg qd, xarelto, ON LASIX 20 mg 5 TIMES A WEEK AND ALDACTONE 25 MG \par 2) Asthma -. use  ipratropium and levalbuterol and budesonide. will start DUPIXENT as has been steroid dependent likely contributing to her weight-- She is hesitant to start biologic injections-  Continue FLONASE AND DYMYSTA NASAL SPRAY---\par 3) high BMI which  is precluding lung transplant. ---referred to nutritionist-/wt loss program-\par 3) oxygen medically necessary given severe Pulm Htn, anemia and h/o nocturnal desaturation which could worsen Pulm HTN. ADvised oxygen 2 lpm x 24hrs\par 4) -She is on xarelto---will be on lifelong anticoagulation---.\par 5) --S/P PPM- follows EP clinic at [place] ----NOW  DR AMBAR HILTON ---- \par 6) chronic palpitations- -- NEEDS TO SEE Dr AMBAR HILTON - heart failure clinic as soon as possible\par 7) she has features of ERICA- including sleep disturbance, nocturnal desat, and excessive fatigue- HST was negative for ERICA in 2014 but had desaturation. Will try to repeat HST at next visit\par 8) follows /[place] Lung Transplant Team Also consulted with Dr Lou at [facility]- needs to lose wt to be considered for transplant listing---\par 9) Anemia:. Will monitor for need for transfusions/iron\par 10) s/p hospitalization august 2022----- for cardiogenic shock due to RV failure with markers of end organ dysfunction,---------. She underwent catheter directed partial\par thrombus aspiration 8/31, course complicated by L fem pseudoaneurysm for which\par she received thrombin injection on 9/3. Course further complicated by S.\par Lugdunensis bacteremia on BCx from 8/29 s/p drainage of abscess at prior----on IV antibiotics as per ID\par 11 left   fem pseudoaneurysm---needs follow up by  vasular \par 11)  f/u in 1 month\par \par \par \par Vivian Yoon MD, FCCP \par Director, Pulmonary Hypertension Program \par [facility] \par Division of Pulmonary, Critical Care and Sleep Medicine \par  Professor of Medicine \par [employer]\par \par \par VANDANA Harrell-C\par \par \par \par \par \par . \par \par  \par \par

## 2022-09-27 NOTE — PHYSICAL EXAM
[No Acute Distress] : no acute distress [Normal Oropharynx] : normal oropharynx [III] : Mallampati Class: III [No Neck Mass] : no neck mass [No Abnormalities] : no abnormalities [Benign] : benign [Normal Gait] : normal gait [No Clubbing] : no clubbing [No Focal Deficits] : no focal deficits [Oriented x3] : oriented x3 [TextBox_54] : Loud P2 , ALSO pansystolic murmur at lower left sternal border due to tricuspid regurgitation [TextBox_68] : Decreased entry at bases

## 2022-09-28 ENCOUNTER — APPOINTMENT (OUTPATIENT)
Dept: VASCULAR SURGERY | Facility: CLINIC | Age: 42
End: 2022-09-28

## 2022-09-30 ENCOUNTER — APPOINTMENT (OUTPATIENT)
Dept: INFECTIOUS DISEASE | Facility: CLINIC | Age: 42
End: 2022-09-30

## 2022-09-30 ENCOUNTER — OUTPATIENT (OUTPATIENT)
Dept: OUTPATIENT SERVICES | Facility: HOSPITAL | Age: 42
LOS: 1 days | End: 2022-09-30
Payer: MEDICAID

## 2022-09-30 VITALS
HEIGHT: 60 IN | WEIGHT: 216 LBS | RESPIRATION RATE: 15 BRPM | SYSTOLIC BLOOD PRESSURE: 163 MMHG | OXYGEN SATURATION: 98 % | HEART RATE: 92 BPM | TEMPERATURE: 97.7 F | BODY MASS INDEX: 42.41 KG/M2 | DIASTOLIC BLOOD PRESSURE: 101 MMHG

## 2022-09-30 DIAGNOSIS — Z98.51 TUBAL LIGATION STATUS: Chronic | ICD-10-CM

## 2022-09-30 DIAGNOSIS — Z95.0 PRESENCE OF CARDIAC PACEMAKER: Chronic | ICD-10-CM

## 2022-09-30 DIAGNOSIS — R78.81 BACTEREMIA: ICD-10-CM

## 2022-09-30 DIAGNOSIS — B97.89 OTHER VIRAL AGENTS AS THE CAUSE OF DISEASES CLASSIFIED ELSEWHERE: ICD-10-CM

## 2022-09-30 PROCEDURE — G0463: CPT

## 2022-09-30 PROCEDURE — 99213 OFFICE O/P EST LOW 20 MIN: CPT

## 2022-10-14 ENCOUNTER — APPOINTMENT (OUTPATIENT)
Dept: PULMONOLOGY | Facility: CLINIC | Age: 42
End: 2022-10-14

## 2022-10-14 ENCOUNTER — APPOINTMENT (OUTPATIENT)
Dept: ELECTROPHYSIOLOGY | Facility: CLINIC | Age: 42
End: 2022-10-14

## 2022-10-14 VITALS
HEIGHT: 60 IN | DIASTOLIC BLOOD PRESSURE: 79 MMHG | WEIGHT: 215 LBS | OXYGEN SATURATION: 89 % | TEMPERATURE: 98.5 F | BODY MASS INDEX: 42.21 KG/M2 | HEART RATE: 115 BPM | SYSTOLIC BLOOD PRESSURE: 125 MMHG

## 2022-10-14 PROCEDURE — 99214 OFFICE O/P EST MOD 30 MIN: CPT

## 2022-10-14 NOTE — HISTORY OF PRESENT ILLNESS
[FreeTextEntry1] : 42 yr old female with stated hx significant for PulmHTN class I/VI on Treprostinil therapy, Chronic PE who originally presented with palpitations accompanied by orthopnea/N/V/RUQ abd pain over a 2 day period. Course c/b hypotension secondary to acute on chronic cor pulmonale with end organ dysfx (EDOUARD, transaminitis), and right atrial clot requiring catheter guided thrombectomy, as well as thrombin injection of right femoral pseudoaneurysm. \par Patient found to have blood cultures from 8/29 positive for staph lugdunensis. TTE from 8/29 and 8/31 identified no vegetations. Repeat cultures from 8/31 NGTD. Patient with catheter directed thrombectomy on 8/31. \par Patient with hx of PPM placed in 2016. Continuous Treprostinil pump site with abscess and MRSA infection. \par allergy to multiple abx. \par pseudoaneurysm, s/p coiling. \par pt s/p doxy for 10 days total for MRSA infection\par discharged on cefazolin 2 gm iv q8h, plan for 4 weeks, given presence of PPM.  \par held off on MYRIAM, after discussion with pulm.\par CT lt groin with no superinfection of hematoma\par pt feeling better, pain in thigh now 3-4/10 in intensity. \par no fever or chills. \par

## 2022-10-14 NOTE — REASON FOR VISIT
[Post Hospitalization] : a post hospitalization visit [Family Member] : family member [FreeTextEntry1] : Bacteremia

## 2022-10-14 NOTE — PHYSICAL EXAM
[General Appearance - Alert] : alert [General Appearance - In No Acute Distress] : in no acute distress [Heart Sounds] : normal S1 and S2 [Abdomen Soft] : soft [Abdomen Tenderness] : non-tender [FreeTextEntry1] : prior abscess site now healed.  [] : no rash [Oriented To Time, Place, And Person] : oriented to person, place, and time

## 2022-10-14 NOTE — ASSESSMENT
[FreeTextEntry1] : 42 yr old female with stated hx significant for PulmHTN class I/VI on Treprostinil therapy, Chronic PE who originally presented with palpitations accompanied by orthopnea/N/V/RUQ abd pain over a 2 day period. Course c/b hypotension secondary to acute on chronic cor pulmonale with end organ dysfx (EDOUARD, transaminitis), and right atrial clot requiring catheter guided thrombectomy, as well as thrombin injection of right femoral pseudoaneurysm. \par Patient found to have blood cultures from 8/29 positive for staph lugdunensis. TTE from 8/29 and 8/31 identified no vegetations. Repeat cultures from 8/31 NGTD. Patient with catheter directed thrombectomy on 8/31. \par Patient with hx of PPM placed in 2016. Continuous Treprostinil pump site with abscess and MRSA infection. \par allergy to multiple abx. \par pseudoaneurysm, s/p coiling. \par pt s/p doxy for 10 days total for MRSA infection\par discharged on cefazolin 2 gm iv q8h, plan for 4 weeks, given presence of PPM.  \par held off on MYRIAM, after discussion with pulm.\par CT lt groin with no superinfection of hematoma\par pt feeling better, pain in thigh now 3-4/10 in intensity. \par no fever or chills. \par c/w abx as planned.\par will get surveillance blood cx 10-14 days after completion of abx.\par pt advised to call office if any change in clinical status. \par all questions answered. \par pt to follow as needed. \par \par

## 2022-10-14 NOTE — REVIEW OF SYSTEMS
[Fever] : no fever [Chills] : no chills [Lower Ext Edema] : no lower extremity edema [Shortness Of Breath] : no shortness of breath [Cough] : no cough [Diarrhea] : no diarrhea [Skin Lesions] : no skin lesions

## 2022-10-15 NOTE — PHYSICAL EXAM
[No Acute Distress] : no acute distress [III] : Mallampati Class: III [Low Lying Soft Palate] : low lying soft palate [No Neck Mass] : no neck mass [Normal Rate/Rhythm] : normal rate/rhythm [Normal Pulses] : normal pulses [No Murmurs] : no murmurs [No Resp Distress] : no resp distress [Clear to Auscultation Bilaterally] : clear to auscultation bilaterally [No Abnormalities] : no abnormalities [Benign] : benign [Normal Gait] : normal gait [No Cyanosis] : no cyanosis [Normal Color/ Pigmentation] : normal color/ pigmentation [No Focal Deficits] : no focal deficits [Oriented x3] : oriented x3 [Normal Affect] : normal affect

## 2022-10-15 NOTE — ASSESSMENT
[FreeTextEntry1] : 43 y/o F with a PMH of PAH on Remodulin presented to the lung transplant clinic for a follow up visit.  \par \par We went over the risks and benefits of lung transplantation including one and five year survival. The median survival after a double lung transplant is about 6.5 years and this was explained in detail. We also went over the risks of opportunistic infections and the risks of calcineurin inhibitor use after the transplant with inherent risks of neoplasms and opportunistic infections and other complications. The post-operative recovery period was explained in detail including the need for mechanical ventilation and other means of cardiopulmonary support including extracorporal membrane oxygenation use and the types of incisions and chest tubes that are required. \par \par At this time, the patient is not as her BMI is too high and this will make surgical closure difficult after a clamshell operation and as well as post-op complications.  Ideally, she would need to get to a foal of 170-180 lbs.  She was referred to Dr. Apodaca for weight loss management.  Otherwise, cont optimal medical medical management for her primary disease.  Agree to follow up in 3 months.  Patient understood and all questions answered. \par  \par \par \par

## 2022-10-15 NOTE — HISTORY OF PRESENT ILLNESS
[TextBox_4] : Ms. ROXI MARIA is a 42 year [id number] evaluated on Oct 14 2022  1:30PM, \par pmhx: bradycardia (s/p pacemaker), asthma, dyspnea on exertion, pulmonary htn, \par \par Initially evaluated for lung transplant 10/21- was advised to loose weight, with goal of 170-180 lbs, pulm rehab referral, recommended sleep study.  [facility] with markers of end organ dysfunction. Started on  as well as Marcela. CTA negative for PE but TTE revealed RA thrombus. She underwent catheter directed partial thrombus aspiration 8/31, course complicated by L fem pseudoaneurysm. She received thrombin injection on 9/3. Course further complicated by S. Lugdunensis bacteremia on BCx from 8/29 s/p drainage of abscess and IV antibiotics as per ID.  Doing well since starting Remodulin 44 ng/kg/min and sildenafil 20 mg 3 times daily on diuretics.  She is on anticoagulation.

## 2022-10-17 NOTE — ED PROVIDER NOTE - NSICDXPASTMEDICALHX_GEN_ALL_CORE_FT
I counseled the patient on his conditions, their implications and medical management.    To observation skin is without ulcer/infection, toes warm without signs ischemia.    Discussed that with current diagnoses, insurance will not covere debridement  Nails/skin.  Patient verbalized understanding and willingness to pay 42$ fornon covered foot care.  Non covered foot care:  With the patient's permission, I debrided all ten toenails with a clean nipper and curette, removing all offending nail and debris.  Patient tolerated the procedure well and related significant relief.      Continue  penlac  Otc urea bid  Pumice stone prn    Discussed conservative treatment with shoes of adequate dimensions, material, and style to alleviate symptoms and delay or prevent surgical intervention.   
PAST MEDICAL HISTORY:  Anemia     Asthma On home O2 nightly at 2L via NC    Asthma     CAD (coronary artery disease)     Corneal disorder     H/O pulmonary hypertension     History of tachycardia     On supplemental oxygen by nasal cannula O2 @ 2L    Pacemaker     PE (pulmonary thromboembolism) on Xarelto 10 mg daily    Pulmonary embolism     Pulmonary embolism     Pulmonary hypertension     Right heart failure     Sinusitis     Skin mass left upper thigh mass    Tachycardia

## 2022-10-20 ENCOUNTER — APPOINTMENT (OUTPATIENT)
Dept: PULMONOLOGY | Facility: CLINIC | Age: 42
End: 2022-10-20

## 2022-10-20 DIAGNOSIS — R78.81 BACTEREMIA: ICD-10-CM

## 2022-10-20 DIAGNOSIS — E66.9 OBESITY, UNSPECIFIED: ICD-10-CM

## 2022-10-20 DIAGNOSIS — B95.8 BACTEREMIA: ICD-10-CM

## 2022-10-20 PROCEDURE — 99215 OFFICE O/P EST HI 40 MIN: CPT | Mod: 95

## 2022-10-20 NOTE — HISTORY OF PRESENT ILLNESS
[Home] : at home, [unfilled] , at the time of the visit. [Medical Office: ([facility])___] : at the medical office located in  [Verbal consent obtained from patient] : the patient, [unfilled] [TextBox_4] : ------Patient is here for follow up of her pulmonary htn. ------------------INITIALLY  REFD  WITH   ECHO [DONE  OUTSIDE ] ELEVATED PASP  DILATED RV AND RA---------  HAS BEEN TOLD IN THE PAST SHE HAS  ASTHMA-----long-standing history of dyspnea on exertion-----------------has baseline tachycardia ----.....No history of fever, chills , rigors, chest pain, or hemoptysis. No h/o significant weight loss in last few months. No history of liver dysfunction , collagen vascular disorder or chronic thromboembolic disease. I would classify her dyspnea as WHO  FUNCTIONAL CLASS III-----------no calf tenderness----------SLEEP STUDY SHOWS AHI 0.6 but T 90  < 35 %-----DIAGNOSED  AS PAH ---------WAS ON ADMAPAS  NOW ON   SQ REMODULIN  [ SINCE MAY 2108]  ---S/P PACEMAKER PLACEMENT AT [place]  AND ON METOPROLOL-----\par \par \par 2/3/2022 tested positive on home covid test 2/2/2022- developed shortness of breath, nausea and vomitting- son is positive\par She is vaccinated for covid but not boosted\par Afebrile, drinking but not eating well. O2 sat 94% room air rest- 92% room air w/walk\par \par 2/7/2022 covid pcr negative - did not get MAB\par still with SOUZA - on prednisone with taper\par \par 4/2022 6mwt o2 sat 95% to 94% on oxygen 69meters (135m) stopped d/t severe SOB and elevated HR\par \par 4/15/2022  pulm htn - on remodulin 36 ng (having jaw pain sometimes), sildenafil 10 mg qd, furosemide 20 mg 5x weekly which helped w/edema. She is also having palpitations- has not yet followed up with DR Chavis in cardiology.\par Asthma-occas wheezing- awaiting start of dupixent\par Up to date w/covid vac- declines influenza vac. s/p consult with lung transplant team but needs to lower BMI before being considered- pt has been referred to nutritionist but has not scheduled appt\par \par \par 5/31/22- pulm htn - on remodulin 36 ng SQ (having jaw pain sometimes), sildenafil 10 mg qd, and diuretics daily- doing well from pulm perspectives\par Asthma currently under control- declining biologic therapy\par Was following lung  transplant team but currently on hold d/t BMI\par \par \par 7/11/22- pulm htn--on remodulin 36 ng SQ (having jaw pain sometimes), sildenafil 10 mg qd and diuretics daily [LASIX WO MG---XARELTO \par was in the hospital at [facility]-- given Lasix --- we will slowly increase her dose of Remodulin\par \par 7/22/2022 pulm htn- breathing improved on higher dose of remodulin 40ng, having some diarrhea. Palpitations improved\par \par 9/15 /2022 ----s/p hospitalization for cardiogenic shock due to RV failure with markers of end organ dysfunction,\par started on  as well as Marcela. CTA negative for PE but TTE revealed RA thrombus\par with concern for clot in transit. She underwent catheter directed partial\par thrombus aspiration 8/31, course complicated by L fem pseudoaneurysm for which\par she received thrombin injection on 9/3. Course further complicated by S.\par Lugdunensis bacteremia on BCx from 8/29 s/p drainage of abscess at prior----on IV antibiotics as per ID\par Remodulin subcutaneous access site.--42 ng/kg/min , also on sildenafil 20 mg 3 times daily\par ----\par \par \par SEPT 27 2022-----feels much better on subcu Remodulin 44 ng/kg/min and sildenafil 20 mg 3 times daily on diuretics-on anticoagulation-------- needs to see vascular for left femoral pseudoaneurysm--- H/O---S.Lugdunensis bacteremia on BCx from 8/29 s/p drainage of abscess at prior----on IV antibiotics as per ID --------ALSO HAS Lleft   fem pseudoaneurysm---needs follow up by  vasular ---- --received flu vaccine today------------------------\par \par October 2022: Telehealth Visit via teledoc\par S/P hospitalization for cardiogenic shock c/b bacteremia on IV antibiotics. Followed by ID (Dr. Grover) for labs, antibiotics now d/c'd (10/6)  getting follow up labs w/ID (results pending from 10/19)\par Pulm htn -She reports use of Remodulin 44 ng/kg/min, tolerating new dose well  and Sildenafil (20 mg t.i.d PO) No resp complaints, no palpitations, follows cardiology Dr Paramjit Chavis. On diuretics, xarelto, O2 as needed\par s/p lung transplant eval- was told she was "too well" to be listed and needs to work on getting her BMI down

## 2022-10-20 NOTE — DISCUSSION/SUMMARY
[FreeTextEntry1] : ----Assessment and Plan--------41 year-old lady with PULMONARY ARTERIAL HYPERTENSION S/P PPM FOR TACHYCARDIA AT [place] ON REMODULIN - S/P LEFT BREAST BIOPSY \par  doing well on remodulin 38 ng ----- ON XARELTO, LASIX AND ALDACTONE -------LOW DOSE PREDNISONE -----is following up with at [facility] for transplant listing-----final decision after the  and psych evaluation\par \par \par 1. PULMONARY ART HTN   ---- WHO GROUP I FUNCTIONAL CLASS III ----\par  [AS PER DR FREEMAN WHO DID PULM ANGIO SHE HAS FEATURES OF GROUP I WITH SOME   EVIDENCE OF DISTAL CLOTS] \par  She is on Remodulin 44 ng/kg/min  -\par  Continue  Sildenafil 20mg qd, Xarelto, ON Lasix 20 mg 5 TIMES A WEEK AND ALDACTONE 25 MG \par \par 2. Asthma -. use  ipratropium and levalbuterol and budesonide. Refuses use of DUPIXENT at this time as she reports her allergies are "not that bad". Has has been steroid dependent likely contributing to her weight. She is hesitant to start biologic injections. \par Has discontinued Flonase and Dymysta nasal spray because of reports epistaxis \par Patient educated about persistent epistaxis to notify team and we will refer to ENT\par \par 3. High BMI (41.9 kg/m2)\par   Precluding lung transplant\par   Referred to nutritionist\par   Weight loss program advised\par \par 4. Oxygen medically necessary given severe Pulm Htn, anemia and h/o nocturnal desaturation which could worsen Pulm HTN. Advised oxygen 2 lpm x 24hrs\par \par 5. She is on Xarelto---will be on lifelong anticoagulation---\par \par 6) She has features of ERICA- including sleep disturbance, nocturnal desat, and excessive fatigue- HST was negative for ERICA in 2014 but had desaturation. Will try to repeat HST at next visit\par \par 8. Follows /[place] [facility] Team\par   ---Also consulted with Dr Lou at [facility]---\par   As per patient, Dr Lou feels she is not a candidate at this time because of good health\par   Although Dr. Lou advised needs to lose wt to be considered for transplant listing in future\par \par 9. Catheter Line D/C's (10/6)\par   Labs being followed by ID (Dr. Grover)\par   Patient educated to contact team if there are any signs of infection\par \par 9. F/U: in 1 month or soon if necessary\par \par VANDANA Harrell-C \par \par for\par \par Vivian Yoon MD, FCCP \par Director, Pulmonary Hypertension Program \par [facility] \par Division of Pulmonary, Critical Care and Sleep Medicine \par  Professor of Medicine \par [employer]

## 2022-10-25 LAB — BACTERIA BLD CULT: NORMAL

## 2022-11-15 ENCOUNTER — NON-APPOINTMENT (OUTPATIENT)
Age: 42
End: 2022-11-15

## 2022-11-16 ENCOUNTER — APPOINTMENT (OUTPATIENT)
Dept: DERMATOLOGY | Facility: CLINIC | Age: 42
End: 2022-11-16

## 2022-11-16 ENCOUNTER — NON-APPOINTMENT (OUTPATIENT)
Age: 42
End: 2022-11-16

## 2022-11-16 VITALS — HEIGHT: 65 IN | BODY MASS INDEX: 35.82 KG/M2 | WEIGHT: 215 LBS

## 2022-11-16 DIAGNOSIS — D48.5 NEOPLASM OF UNCERTAIN BEHAVIOR OF SKIN: ICD-10-CM

## 2022-11-16 DIAGNOSIS — D36.10 BENIGN NEOPLASM OF PERIPHERAL NERVES AND AUTONOMIC NERVOUS SYSTEM, UNSPECIFIED: ICD-10-CM

## 2022-11-16 PROCEDURE — 99204 OFFICE O/P NEW MOD 45 MIN: CPT

## 2022-11-16 NOTE — PHYSICAL EXAM
[FreeTextEntry3] : AAOx3, pleasant, NAD, no visual lymphadenopathy\par hair, scalp, face, nose, eyelids, ears, lips, oropharynx, neck, chest, abdomen, back, right arm, left arm, nails, and hands examined with all normal findings,\par pertinent findings include:\par \par growth on left thigh

## 2022-11-16 NOTE — ASSESSMENT
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

## 2022-11-18 ENCOUNTER — APPOINTMENT (OUTPATIENT)
Dept: ELECTROPHYSIOLOGY | Facility: CLINIC | Age: 42
End: 2022-11-18

## 2022-12-01 ENCOUNTER — APPOINTMENT (OUTPATIENT)
Dept: PLASTIC SURGERY | Facility: CLINIC | Age: 42
End: 2022-12-01

## 2022-12-13 NOTE — PHYSICAL THERAPY INITIAL EVALUATION ADULT - AMBULATION SKILLS, REHAB EVAL
I recommend the following over the counter supplements every night at bedtime:  Start magnesium oxide 400mg gel cap by mouth every night, may take extra dose if needed for headache (over the counter), hold for diarrhea         Start Riboflavin (Vitamin B2) 400mg by mouth every night (over the counter),may turn urine bright yellow         Start COQ 10, take 300mg every night. (over the counter)          Attempt to go to bed and get up at the same time every night           Eat meals on regular basis            Stay hydrated.             Aerobic exercise 30 minutes daily             Avoid aged or smoked foods, avoid processed foods, red wine, aged cheese              Keep headache diary, include foods that you may have eaten.             Avoid overusing over the counter medications:  Do not take more than 500mg acetaminophen (tylenol), more than 4 times weekly, more frequent or larger doses are associated with medication overuse headache.     independent/Pt lives in an pt with elevator access and was Ind with all ADLs. Pt's spouse and children ( 8 & 12) provide assistance as needed.

## 2022-12-14 ENCOUNTER — APPOINTMENT (OUTPATIENT)
Dept: ELECTROPHYSIOLOGY | Facility: CLINIC | Age: 42
End: 2022-12-14

## 2022-12-14 ENCOUNTER — APPOINTMENT (OUTPATIENT)
Dept: CARDIOLOGY | Facility: CLINIC | Age: 42
End: 2022-12-14
Payer: MEDICAID

## 2022-12-14 DIAGNOSIS — R55 SYNCOPE AND COLLAPSE: ICD-10-CM

## 2022-12-14 PROCEDURE — 93280 PM DEVICE PROGR EVAL DUAL: CPT

## 2022-12-14 PROCEDURE — 93000 ELECTROCARDIOGRAM COMPLETE: CPT | Mod: 59

## 2022-12-14 PROCEDURE — 99214 OFFICE O/P EST MOD 30 MIN: CPT | Mod: 25

## 2022-12-14 RX ORDER — HYDROXYZINE HYDROCHLORIDE 25 MG/1
25 TABLET ORAL
Qty: 4 | Refills: 0 | Status: DISCONTINUED | COMMUNITY
Start: 2020-11-05 | End: 2022-12-14

## 2022-12-14 RX ORDER — AZITHROMYCIN 250 MG/1
250 TABLET, FILM COATED ORAL
Qty: 1 | Refills: 0 | Status: DISCONTINUED | COMMUNITY
Start: 2022-11-15 | End: 2022-12-14

## 2022-12-14 RX ORDER — GUAIFENESIN 600 MG/1
600 TABLET, EXTENDED RELEASE ORAL
Qty: 60 | Refills: 2 | Status: DISCONTINUED | COMMUNITY
Start: 2020-03-25 | End: 2022-12-14

## 2022-12-14 RX ORDER — DIPHENHYDRAMINE HYDROCHLORIDE 25 MG/1
25 CAPSULE, LIQUID FILLED ORAL
Qty: 30 | Refills: 1 | Status: DISCONTINUED | COMMUNITY
Start: 2022-11-15 | End: 2022-12-14

## 2022-12-14 RX ORDER — ONDANSETRON 4 MG/1
4 TABLET, ORALLY DISINTEGRATING ORAL
Qty: 20 | Refills: 1 | Status: DISCONTINUED | COMMUNITY
Start: 2022-02-03 | End: 2022-12-14

## 2022-12-15 NOTE — ASSESSMENT
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 Appears mildly hypervolemic with poor response to lasix 20 mg. Ultimately would benefit from lung transplant which she is working towards becoming a candidate with reduction in her BMI; per patient being delayed as she's been doing reasonably well. \par \par 1. Pulmonary HTN group I and IV)\par - on continuous remodulin at 42 mcg/kg/min to go up to 44 mcg/kg/min on 9/22 as per pulm\par - follow up with Dr. Yoon\par - may benefit from Ozempic to assist with weight loss; Dr. Yoon's team to address\par \par 2. Right  heart failure with normal LVEF\par - appears mildly overloaded\par - on furosemide 20 mg three times a week on M-W-F; instructed to take 40 mg every other day and as needed for weight gain of 2-3 lbs in 2-3 days\par - continue spironolactone 25 mg daily\par - limit salt to less than 2000 mg per day\par -continue calorie reduction to decrease BMI\par - repeat TTE \par \par 3. RA thrombus with  Hx of PE and evidence of distal emboli\par - underwent catheter directed partial thrombus aspiration 8/31/22, course complicated by L fem pseudoaneurysm for which she received thrombin injection on 9/3. \par - continue Xarelto 20 mg daily\par - no s/s of bleeding\par \par 4. 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

## 2022-12-15 NOTE — HISTORY OF PRESENT ILLNESS
[FreeTextEntry1] : Ms. Conway is a 43 y/o F with asthma, RVSD in the setting of pulmonary hypertension (group I and IV; on intermittent home O2 2LNC) with hx of PE (on Xarelto) and evidence of distal emboli (on subcutaneous remodulin), RA thrombus s/p partial aspiration (22;on AC), prior AVNRT ablation (), bradycardia s/p dual chamber PPM who presents for follow up. Accompanied by her daughter. Referred by Dr. Yoon. Previously followed by Dr. Garcia.\par \par Since last visit in September, has been doing well. Of note, had one admission -22 for abdominal discomfort and nausea/emesis, found to be in cardiogenic shock due to RV failure with markers of end organ dysfunction. Had required  and Marcela. CTA negative for PE but TTE revealed RA thrombus with concern for clot in transit. She underwent catheter directed partial thrombus aspiration , course complicated by L fem pseudoaneurysm for which she received thrombin injection on 9/3. Course further complicated by S.Lugdunensis bacteremia on BCx from  s/p drainage of abscess at prior Remodulin subcutaneous access site.\par \par Her pulmonary HTN workup has included the following: \par 2022- CTA negative for PE  but TTE revealed RA thrombus with concern for clot in transit S/P partial aspiration\par  - CTA - negative for PE; mod pericardial effusion; no lymphadenopathy\par  - negative for HIV, SCOTT\par 6/15 - low probability V/Q scan but heterogeneous distribution \par  - anti-DsDNA, anti-SSA negative\par \par Currently, she is on Remodulin infusion at 42 mcg/kg/min continuous with plan to go up to 44 mcg/kg/min as per pulm note. Gets occasional headaches, diarrhea and jaw pain. \par \par Has been taking furosemide 20 mg QOD but reports suboptimal response.\par \par She was being evaluated for lung transplant at [place] and follows there with Dr. Martinez but now being considered at [facility]. \par \par States weight has been approx 215 pounds. BMI has reduced to 35. She is adhering to low salt diet and has decreased carbs. Limits fluid to less than 2 liters/day. \par \par She uses continuous oxygen at 2 L (for 2 years) at rest but recently using less. Walks 3 room length without dyspnea. She walks up to 1/2 block but states her heart rate goes up with activity. She has 5 steps to elevator at home which she has trouble with. Sleeps with one pillow. States that she feels bloated in hands when she is retaining fluid but  denies lower extremity edema. \par \par Has occasional dizziness. Had syncope several years ago and had PPM implanted in 2019. No further syncope. No recent dizziness/LH. \par \par She had Pfizer vaccine x 2 and did not get booster since had mild reaction with last vaccine. She has not had Covid.

## 2022-12-15 NOTE — CARDIOLOGY SUMMARY
[de-identified] : \par 12/14/22 unchanged\par 9/21/22 NSR 87, RBBB, RAD, NSST\par 6/10/22 sinus tachycardia 109, RAD, TAJ, RBBB, TWI [de-identified] : \par 8/2022  TTE revealed RA thrombus with concern for clot in transit. She underwent catheter directed partial\par thrombus aspiration 8/31\par \par Limited TTE 8/31 @10:30: LVIDd 3.2 cm, hyperdynamic LV, new echogenic mass in RA 1.8 cm x 3.1 cm likely representing clot in transit (not seen on TTE from 8/29), RVE with decreased RV systolic function, est PASP 60 mmHg\par \par TTE 8/29/22: LVIDd 1.3 cm, normal LVSF, unable to asess LVEF given small cavity, flattening of interventricular septum consistent with RV overload, RVE with decreased systolic function (RV measuring 1.1 cm), severe TAJ (IAS bowed to L), PI end diastolic pressures 27 mmHg hence PA end diastolic pressure is at least 57 mmHg (TAPSE 1.1 cm), severe TR, est PASP 96 mmHg\par \par 11/2/20 - LVEF 65-70%, LIVDd 2.6, RV enlargement with decreased RV systolic function, mild-mod TR RVSP 34 (likely underestimated), D shaped septum in systole/diastole, mod pulmonic insufficiency, IVC small and collapsed  [de-identified] : \par 9/1/22 Cath for venous thrombectomy right arm \par RHC 7/23/20 RA 4, RV 80/4, PA 80/40/57, PCWP 22, LVEDP 4; CO/CI 4/2.0; PVR 11 (using EDP 13).

## 2022-12-15 NOTE — PHYSICAL EXAM
[Well Nourished] : well nourished [Obese] : obese [Normal Conjunctiva] : normal conjunctiva [Normal S1, S2] : normal S1, S2 [Clear Lung Fields] : clear lung fields [No Edema] : no edema [Normal] : alert and oriented, normal memory [de-identified] : JVP 8-10 cm with mild HJR  [de-identified] : Left chest PPM [de-identified] : not using  2 L nasal cannula continuous [de-identified] : softly distended [de-identified] : slow gait, useds wheelchair for long distances, discomfort right shoulder with ROM [de-identified] : no swelling of hands or lower extremities

## 2022-12-20 ENCOUNTER — RX RENEWAL (OUTPATIENT)
Age: 42
End: 2022-12-20

## 2022-12-27 ENCOUNTER — NON-APPOINTMENT (OUTPATIENT)
Age: 42
End: 2022-12-27

## 2023-01-01 NOTE — PROGRESS NOTE ADULT - ASSESSMENT
36 y/o female with PMHx of pulm htn w/ features of WHO Group I and IV.  Tolerating Adempas    -- c/w Midodrine for BP support  -- would initiate low dose BB for HR control   -- monitor BPs closely, continue with Midodrine 5 tid      will discuss with Dr Car Holman  Pulmonary Fellow  p [phone] Statement Selected

## 2023-01-03 ENCOUNTER — APPOINTMENT (OUTPATIENT)
Dept: PULMONOLOGY | Facility: CLINIC | Age: 43
End: 2023-01-03
Payer: MEDICAID

## 2023-01-03 ENCOUNTER — INPATIENT (INPATIENT)
Facility: HOSPITAL | Age: 43
LOS: 1 days | Discharge: HOME | DRG: 857 | End: 2023-01-05
Attending: INTERNAL MEDICINE | Admitting: STUDENT IN AN ORGANIZED HEALTH CARE EDUCATION/TRAINING PROGRAM
Payer: MEDICAID

## 2023-01-03 VITALS
DIASTOLIC BLOOD PRESSURE: 84 MMHG | SYSTOLIC BLOOD PRESSURE: 132 MMHG | RESPIRATION RATE: 15 BRPM | OXYGEN SATURATION: 90 % | HEIGHT: 65 IN | HEART RATE: 141 BPM | WEIGHT: 221 LBS | BODY MASS INDEX: 36.82 KG/M2 | TEMPERATURE: 97.3 F

## 2023-01-03 VITALS
DIASTOLIC BLOOD PRESSURE: 81 MMHG | HEART RATE: 95 BPM | HEIGHT: 65 IN | WEIGHT: 220.9 LBS | TEMPERATURE: 98 F | RESPIRATION RATE: 16 BRPM | SYSTOLIC BLOOD PRESSURE: 123 MMHG | OXYGEN SATURATION: 97 %

## 2023-01-03 DIAGNOSIS — Z95.0 PRESENCE OF CARDIAC PACEMAKER: Chronic | ICD-10-CM

## 2023-01-03 DIAGNOSIS — L02.211 CUTANEOUS ABSCESS OF ABDOMINAL WALL: ICD-10-CM

## 2023-01-03 DIAGNOSIS — Z98.51 TUBAL LIGATION STATUS: Chronic | ICD-10-CM

## 2023-01-03 DIAGNOSIS — K65.1 PERITONEAL ABSCESS: ICD-10-CM

## 2023-01-03 DIAGNOSIS — I51.3 INTRACARDIAC THROMBOSIS, NOT ELSEWHERE CLASSIFIED: ICD-10-CM

## 2023-01-03 DIAGNOSIS — I27.20 PULMONARY HYPERTENSION, UNSPECIFIED: ICD-10-CM

## 2023-01-03 LAB
ALBUMIN SERPL ELPH-MCNC: 4.3 G/DL — SIGNIFICANT CHANGE UP (ref 3.3–5)
ALP SERPL-CCNC: 64 U/L — SIGNIFICANT CHANGE UP (ref 40–120)
ALT FLD-CCNC: 8 U/L — LOW (ref 10–45)
ANION GAP SERPL CALC-SCNC: 12 MMOL/L — SIGNIFICANT CHANGE UP (ref 5–17)
APTT BLD: 42.8 SEC — HIGH (ref 27.5–35.5)
AST SERPL-CCNC: 10 U/L — SIGNIFICANT CHANGE UP (ref 10–40)
BASOPHILS # BLD AUTO: 0 K/UL — SIGNIFICANT CHANGE UP (ref 0–0.2)
BASOPHILS NFR BLD AUTO: 0 % — SIGNIFICANT CHANGE UP (ref 0–2)
BILIRUB SERPL-MCNC: 0.9 MG/DL — SIGNIFICANT CHANGE UP (ref 0.2–1.2)
BUN SERPL-MCNC: 8 MG/DL — SIGNIFICANT CHANGE UP (ref 7–23)
CALCIUM SERPL-MCNC: 8.5 MG/DL — SIGNIFICANT CHANGE UP (ref 8.4–10.5)
CHLORIDE SERPL-SCNC: 101 MMOL/L — SIGNIFICANT CHANGE UP (ref 96–108)
CO2 SERPL-SCNC: 23 MMOL/L — SIGNIFICANT CHANGE UP (ref 22–31)
CREAT SERPL-MCNC: 0.97 MG/DL — SIGNIFICANT CHANGE UP (ref 0.5–1.3)
EGFR: 75 ML/MIN/1.73M2 — SIGNIFICANT CHANGE UP
EOSINOPHIL # BLD AUTO: 0.26 K/UL — SIGNIFICANT CHANGE UP (ref 0–0.5)
EOSINOPHIL NFR BLD AUTO: 1.8 % — SIGNIFICANT CHANGE UP (ref 0–6)
FLUAV AG NPH QL: SIGNIFICANT CHANGE UP
FLUBV AG NPH QL: SIGNIFICANT CHANGE UP
GLUCOSE SERPL-MCNC: 76 MG/DL — SIGNIFICANT CHANGE UP (ref 70–99)
HCG SERPL-ACNC: <2 MIU/ML — SIGNIFICANT CHANGE UP
HCT VFR BLD CALC: 42.8 % — SIGNIFICANT CHANGE UP (ref 34.5–45)
HGB BLD-MCNC: 13.1 G/DL — SIGNIFICANT CHANGE UP (ref 11.5–15.5)
INR BLD: 1.49 RATIO — HIGH (ref 0.88–1.16)
LYMPHOCYTES # BLD AUTO: 1.82 K/UL — SIGNIFICANT CHANGE UP (ref 1–3.3)
LYMPHOCYTES # BLD AUTO: 12.4 % — LOW (ref 13–44)
MCHC RBC-ENTMCNC: 20.9 PG — LOW (ref 27–34)
MCHC RBC-ENTMCNC: 30.6 GM/DL — LOW (ref 32–36)
MCV RBC AUTO: 68.3 FL — LOW (ref 80–100)
MONOCYTES # BLD AUTO: 0.78 K/UL — SIGNIFICANT CHANGE UP (ref 0–0.9)
MONOCYTES NFR BLD AUTO: 5.3 % — SIGNIFICANT CHANGE UP (ref 2–14)
NEUTROPHILS # BLD AUTO: 11.83 K/UL — HIGH (ref 1.8–7.4)
NEUTROPHILS NFR BLD AUTO: 80.5 % — HIGH (ref 43–77)
PLATELET # BLD AUTO: 357 K/UL — SIGNIFICANT CHANGE UP (ref 150–400)
POTASSIUM SERPL-MCNC: 3.4 MMOL/L — LOW (ref 3.5–5.3)
POTASSIUM SERPL-SCNC: 3.4 MMOL/L — LOW (ref 3.5–5.3)
PROT SERPL-MCNC: 7.5 G/DL — SIGNIFICANT CHANGE UP (ref 6–8.3)
PROTHROM AB SERPL-ACNC: 17.3 SEC — HIGH (ref 10.5–13.4)
RBC # BLD: 6.27 M/UL — HIGH (ref 3.8–5.2)
RBC # FLD: 20.7 % — HIGH (ref 10.3–14.5)
RSV RNA NPH QL NAA+NON-PROBE: SIGNIFICANT CHANGE UP
SARS-COV-2 RNA SPEC QL NAA+PROBE: SIGNIFICANT CHANGE UP
SODIUM SERPL-SCNC: 136 MMOL/L — SIGNIFICANT CHANGE UP (ref 135–145)
WBC # BLD: 14.69 K/UL — HIGH (ref 3.8–10.5)
WBC # FLD AUTO: 14.69 K/UL — HIGH (ref 3.8–10.5)

## 2023-01-03 PROCEDURE — 99223 1ST HOSP IP/OBS HIGH 75: CPT | Mod: GC

## 2023-01-03 PROCEDURE — 74177 CT ABD & PELVIS W/CONTRAST: CPT | Mod: 26,MA

## 2023-01-03 PROCEDURE — 99285 EMERGENCY DEPT VISIT HI MDM: CPT

## 2023-01-03 PROCEDURE — 99215 OFFICE O/P EST HI 40 MIN: CPT

## 2023-01-03 RX ORDER — LIDOCAINE HCL 20 MG/ML
40 VIAL (ML) INJECTION ONCE
Refills: 0 | Status: DISCONTINUED | OUTPATIENT
Start: 2023-01-03 | End: 2023-01-05

## 2023-01-03 RX ORDER — HYDROMORPHONE HYDROCHLORIDE 2 MG/ML
0.5 INJECTION INTRAMUSCULAR; INTRAVENOUS; SUBCUTANEOUS ONCE
Refills: 0 | Status: DISCONTINUED | OUTPATIENT
Start: 2023-01-03 | End: 2023-01-03

## 2023-01-03 RX ORDER — CEFAZOLIN SODIUM 1 G
2000 VIAL (EA) INJECTION ONCE
Refills: 0 | Status: COMPLETED | OUTPATIENT
Start: 2023-01-03 | End: 2023-01-03

## 2023-01-03 RX ORDER — ACETAMINOPHEN 500 MG
1000 TABLET ORAL ONCE
Refills: 0 | Status: COMPLETED | OUTPATIENT
Start: 2023-01-03 | End: 2023-01-03

## 2023-01-03 RX ADMIN — HYDROMORPHONE HYDROCHLORIDE 0.5 MILLIGRAM(S): 2 INJECTION INTRAMUSCULAR; INTRAVENOUS; SUBCUTANEOUS at 22:14

## 2023-01-03 RX ADMIN — Medication 100 MILLIGRAM(S): at 23:49

## 2023-01-03 RX ADMIN — Medication 100 MILLIGRAM(S): at 16:02

## 2023-01-03 RX ADMIN — Medication 400 MILLIGRAM(S): at 23:30

## 2023-01-03 RX ADMIN — HYDROMORPHONE HYDROCHLORIDE 0.5 MILLIGRAM(S): 2 INJECTION INTRAMUSCULAR; INTRAVENOUS; SUBCUTANEOUS at 17:30

## 2023-01-03 RX ADMIN — HYDROMORPHONE HYDROCHLORIDE 0.5 MILLIGRAM(S): 2 INJECTION INTRAMUSCULAR; INTRAVENOUS; SUBCUTANEOUS at 23:44

## 2023-01-03 NOTE — ED ADULT NURSE NOTE - OBJECTIVE STATEMENT
43 y/o female PMH pulmonary hypertension on Remodulin presents to ED reporting R abdominal abscess. Patient reports noticing R abdominal abscess forming where pt was infusing SQ Remodulin, moved site to L side. Patient reports receiving Remodulin .048 mL/hr from 100mg/20mL vial SQ. On exam, AOx3, speaking in complete sentences. Unlabored, spontaneous respirations, NAD, 2L NC home o2. Abdomen tender RLQ, abscess noted. PA at bedside to place USIV and obtain lab work.

## 2023-01-03 NOTE — H&P ADULT - ASSESSMENT
42y F pmh pulmHTN on 2L O2 and remodulin SQ pump, PPM, RA thrombus on xarelto s/p thrombectomy, multiple skin abscess formations here for evaluation of abdominal wall abscess

## 2023-01-03 NOTE — ED PROVIDER NOTE - TEST CONSIDERED BUT NOT PERFORMED
Tests Considered But Not Performed opted for CT instead of US to evaluate soft tissue infection due to concern for deeper involvement

## 2023-01-03 NOTE — H&P ADULT - SKIN COMMENTS
RLQ of abm abscess s/p I&D, dsg intact with serosanguinous drainage, no surrounding fluctuance, tenderness . Pump in LLQ

## 2023-01-03 NOTE — ED PROVIDER NOTE - DIFFERENTIAL DIAGNOSIS
Differential Diagnosis Ddx includes, however, is not limited to: cellulitis, abscess, catheter infection, Ddx includes, however, is not limited to: cellulitis, abscess, catheter infection, intraabdominal infection, bacteremia

## 2023-01-03 NOTE — H&P ADULT - NSHPLABSRESULTS_GEN_ALL_CORE
13.1   14.69 )-----------( 357      ( 03 Jan 2023 16:27 )             42.8     01-03    136  |  101  |  8   ----------------------------<  76  3.4<L>   |  23  |  0.97    Ca    8.5      03 Jan 2023 16:27    TPro  7.5  /  Alb  4.3  /  TBili  0.9  /  DBili  x   /  AST  10  /  ALT  8<L>  /  AlkPhos  64  01-03      EKG personally reviewed:      Images personally reviewed:  < from: CT Abdomen and Pelvis w/ IV Cont (01.03.23 @ 17:56) >  1.  4.6 cm right lower quadrant anterior abdominal wall abscess with   surrounding inflammation and skin thickening.    2.  Left lower quadrant abdominal wall skin thickening with tiny   associated subcutaneous collection and overlying medical device appears   similar to prior. 13.1   14.69 )-----------( 357      ( 03 Jan 2023 16:27 )             42.8     01-03    136  |  101  |  8   ----------------------------<  76  3.4<L>   |  23  |  0.97    Ca    8.5      03 Jan 2023 16:27    TPro  7.5  /  Alb  4.3  /  TBili  0.9  /  DBili  x   /  AST  10  /  ALT  8<L>  /  AlkPhos  64  01-03      EKG personally reviewed:  NSR 90bpm     Images personally reviewed:  < from: CT Abdomen and Pelvis w/ IV Cont (01.03.23 @ 17:56) >  1.  4.6 cm right lower quadrant anterior abdominal wall abscess with   surrounding inflammation and skin thickening.    2.  Left lower quadrant abdominal wall skin thickening with tiny   associated subcutaneous collection and overlying medical device appears   similar to prior.

## 2023-01-03 NOTE — H&P ADULT - NSICDXPASTSURGICALHX_GEN_ALL_CORE_FT
PAST SURGICAL HISTORY:  H/O tubal ligation     History of pacemaker 12/19 - [employer] model Ingevity 4469    History of tubal ligation     Pacemaker

## 2023-01-03 NOTE — H&P ADULT - HISTORY OF PRESENT ILLNESS
42y F pmh pulmHTN on 2L O2 and remodulin SQ pump, ILD, PPM, RA thrombus on xarelto s/p thrombectomy, multiple skin abscess formations here for evaluation of abdominal wall abscess. Pt usually has remodulin pump to RLQ, noted last week to develop pain and swelling to that area and changed pump site to the LLQ. Since then started to develop worsening pain and swelling to the RLQ abdominal area, went to see her pulmonologist/PCP Dr. Yoon who recommend she come to the ED for evaluation of the abscess.   Pt denies CP, SOB, N/V, fever, chills, purulence or drainage from the abscess site    In ED: doxycycline 100mg, IV Tylenol, Dilaudid 0.5mg  42y F pmh pulmHTN on 2L O2 and remodulin SQ pump, ILD, PPM, RA thrombus on xarelto s/p thrombectomy, multiple skin abscess formations here for evaluation of abdominal wall abscess. Pt usually has remodulin pump to RLQ, noted last week to develop pain and swelling to that area and changed pump site to the LLQ. Since then started to develop worsening pain and swelling to the RLQ abdominal area, went to see her pulmonologist/PCP Dr. Yoon who recommend she come to the ED for evaluation of the abscess.   Pt denies CP, SOB, N/V, fever, chills, purulence or drainage from the abscess site    In ED: doxycycline 100mg, ciprofloxacin, IV Tylenol, Dilaudid 0.5mg

## 2023-01-03 NOTE — ED PROVIDER NOTE - CONSIDERATION OF ADMISSION OBSERVATION
Consideration of Admission/Observation patient will require admission given concern for possible systemic infection and abscess that may require surgical intervention

## 2023-01-03 NOTE — CONSULT NOTE ADULT - SUBJECTIVE AND OBJECTIVE BOX
PULMONARY SERVICE INITIAL CONSULT NOTE    HPI:  42F PMHx of pulmonary HTN (group I and group IV; on Treprostinil and Xarelto), home o2 (2L), ILD, JULIA, obesity, hx of abdominal abscesses (related to treprostinil pump), RV failure, RA thrombus s/p thrombectomy, and PPM here for right wall abdominal abscess. Patient sent in by Dr. Yoon. Pulmonary consulted for management of Remodulin pump. Patient with pain around her abdominal wound. Denies dyspnea, orthopnea, worsening LE edema or increase in her o2 requirements.       REVIEW OF SYSTEMS:  All additional ROS negative.    PAST MEDICAL & SURGICAL HISTORY:  Tachycardia      Anemia      Pulmonary hypertension      Pulmonary embolism      Asthma  On home O2 nightly at 2L via NC      PE (pulmonary thromboembolism)  on Xarelto 10 mg daily      Sinusitis      Corneal disorder      Right heart failure      CAD (coronary artery disease)      H/O pulmonary hypertension      Pulmonary embolism      Asthma      History of tachycardia      On supplemental oxygen by nasal cannula  O2 @ 2L      Pacemaker      Skin mass  left upper thigh mass      History of tubal ligation      Pacemaker      H/O tubal ligation      History of pacemaker  12/19 - [employer] model Ingevity 4469          FAMILY HISTORY:  Family history of diabetes mellitus  in brother    Family history of diabetes mellitus in mother    FH: anemia        SOCIAL HISTORY:  Smoking Status: [ ] Current, [ ] Former, [x ] Never  Pack Years:    MEDICATIONS:  Pulmonary:    Antimicrobials:  doxycycline IVPB        Anticoagulants:    Onc:    GI/:    Endocrine:    Cardiac:    Other Medications:      Allergies    adhesives (Rash)  penicillin (Rash)  vancomycin (Rash)    Intolerances    albuterol (Other; Rash)      PHYSICAL EXAM  Vital Signs Last 24 Hrs  T(C): 37.1 (03 Jan 2023 16:06), Max: 37.1 (03 Jan 2023 16:06)  T(F): 98.7 (03 Jan 2023 16:06), Max: 98.7 (03 Jan 2023 16:06)  HR: 97 (03 Jan 2023 16:06) (95 - 97)  BP: 97/72 (03 Jan 2023 16:06) (97/72 - 123/81)  BP(mean): --  RR: 16 (03 Jan 2023 16:06) (16 - 16)  SpO2: 100% (03 Jan 2023 16:06) (97% - 100%)    Parameters below as of 03 Jan 2023 16:06  Patient On (Oxygen Delivery Method): nasal cannula  O2 Flow (L/min): 2          CONSTITUTIONAL: No acute distress.   HEENT:  Conjunctiva clear B/L.  Moist oral mucosa.   Cardiovascular: RRR with no murmurs. No JVD noted. No lower extremity edema B/L. Extremities are warm and well perfused.    Respiratory: Lungs CTAB. No wrr. No accessory muscle use.   Gastrointestinal:  Soft, nontender. Non-distended. Non-rigid.    Neurologic:  Alert and awake. Moving all extremities. Following commands.    Skin: abdominal wall cellulitis with fluctuance noted.      LABS:      CBC Full  -  ( 03 Jan 2023 16:27 )  WBC Count : 14.69 K/uL  RBC Count : 6.27 M/uL  Hemoglobin : 13.1 g/dL  Hematocrit : 42.8 %  Platelet Count - Automated : 357 K/uL  Mean Cell Volume : 68.3 fl  Mean Cell Hemoglobin : 20.9 pg  Mean Cell Hemoglobin Concentration : 30.6 gm/dL  Auto Neutrophil # : 11.83 K/uL  Auto Lymphocyte # : 1.82 K/uL  Auto Monocyte # : 0.78 K/uL  Auto Eosinophil # : 0.26 K/uL  Auto Basophil # : 0.00 K/uL  Auto Neutrophil % : 80.5 %  Auto Lymphocyte % : 12.4 %  Auto Monocyte % : 5.3 %  Auto Eosinophil % : 1.8 %  Auto Basophil % : 0.0 %    01-03    136  |  101  |  8   ----------------------------<  76  3.4<L>   |  23  |  0.97    Ca    8.5      03 Jan 2023 16:27    TPro  7.5  /  Alb  4.3  /  TBili  0.9  /  DBili  x   /  AST  10  /  ALT  8<L>  /  AlkPhos  64  01-03    PT/INR - ( 03 Jan 2023 16:27 )   PT: 17.3 sec;   INR: 1.49 ratio         PTT - ( 03 Jan 2023 16:27 )  PTT:42.8 sec        PULMONARY SERVICE INITIAL CONSULT NOTE    HPI:  42F PMHx of pulmonary HTN (group I and group IV; on Treprostinil and Xarelto), home o2 (2L), ILD, JULIA, obesity, hx of abdominal abscesses (related to treprostinil pump), RV failure, RA thrombus s/p thrombectomy, and PPM here for right wall abdominal abscess. Patient sent in by Dr. Yoon. Pulmonary consulted for management of Remodulin pump. Patient with pain around her abdominal wound. Denies dyspnea, orthopnea, worsening LE edema or increase in her o2 requirements.       REVIEW OF SYSTEMS:  All additional ROS negative.    PAST MEDICAL & SURGICAL HISTORY:  Tachycardia  Anemia  Pulmonary hypertension  Pulmonary embolism  Asthma / On home O2 nightly at 2L via NC  PE (pulmonary thromboembolism) on Xarelto 10 mg daily  Sinusitis  Corneal disorder  Right heart failure  CAD (coronary artery disease)  H/O pulmonary hypertension  Pulmonary embolism  Asthma  History of tachycardia  On supplemental oxygen by nasal cannula / O2 @ 2L  Pacemaker  Skin mass / left upper thigh mass  History of tubal ligation  Pacemaker 12/19 - [employer] model Ingevity 4469    FAMILY HISTORY:  Family history of diabetes mellitus  in brother    Family history of diabetes mellitus in mother    FH: anemia        SOCIAL HISTORY:  Smoking Status: [ ] Current, [ ] Former, [x ] Never  Pack Years:    MEDICATIONS:  Pulmonary:    Antimicrobials:  doxycycline IVPB        Anticoagulants:    Onc:    GI/:    Endocrine:    Cardiac:    Other Medications:      Allergies    adhesives (Rash)  penicillin (Rash)  vancomycin (Rash)    Intolerances    albuterol (Other; Rash)      PHYSICAL EXAM  Vital Signs Last 24 Hrs  T(C): 37.1 (03 Jan 2023 16:06), Max: 37.1 (03 Jan 2023 16:06)  T(F): 98.7 (03 Jan 2023 16:06), Max: 98.7 (03 Jan 2023 16:06)  HR: 97 (03 Jan 2023 16:06) (95 - 97)  BP: 97/72 (03 Jan 2023 16:06) (97/72 - 123/81)  BP(mean): --  RR: 16 (03 Jan 2023 16:06) (16 - 16)  SpO2: 100% (03 Jan 2023 16:06) (97% - 100%)    Parameters below as of 03 Jan 2023 16:06  Patient On (Oxygen Delivery Method): nasal cannula  O2 Flow (L/min): 2          CONSTITUTIONAL: No acute distress.   HEENT:  Conjunctiva clear B/L.  Moist oral mucosa.   Cardiovascular: RRR with no murmurs. No JVD noted. No lower extremity edema B/L. Extremities are warm and well perfused.    Respiratory: Lungs CTAB. No wrr. No accessory muscle use.   Gastrointestinal:  Soft, nontender. Non-distended. Non-rigid.    Neurologic:  Alert and awake. Moving all extremities. Following commands.    Skin: abdominal wall cellulitis with fluctuance noted.      LABS:      CBC Full  -  ( 03 Jan 2023 16:27 )  WBC Count : 14.69 K/uL  RBC Count : 6.27 M/uL  Hemoglobin : 13.1 g/dL  Hematocrit : 42.8 %  Platelet Count - Automated : 357 K/uL  Mean Cell Volume : 68.3 fl  Mean Cell Hemoglobin : 20.9 pg  Mean Cell Hemoglobin Concentration : 30.6 gm/dL  Auto Neutrophil # : 11.83 K/uL  Auto Lymphocyte # : 1.82 K/uL  Auto Monocyte # : 0.78 K/uL  Auto Eosinophil # : 0.26 K/uL  Auto Basophil # : 0.00 K/uL  Auto Neutrophil % : 80.5 %  Auto Lymphocyte % : 12.4 %  Auto Monocyte % : 5.3 %  Auto Eosinophil % : 1.8 %  Auto Basophil % : 0.0 %    01-03    136  |  101  |  8   ----------------------------<  76  3.4<L>   |  23  |  0.97    Ca    8.5      03 Jan 2023 16:27    TPro  7.5  /  Alb  4.3  /  TBili  0.9  /  DBili  x   /  AST  10  /  ALT  8<L>  /  AlkPhos  64  01-03    PT/INR - ( 03 Jan 2023 16:27 )   PT: 17.3 sec;   INR: 1.49 ratio         PTT - ( 03 Jan 2023 16:27 )  PTT:42.8 sec

## 2023-01-03 NOTE — H&P ADULT - ATTENDING COMMENTS
DATE OF SERVICE: 01-03-23    42F with PMHx as listed above with current use of remodulin pump with history of cutaneous abscesses at pump site who presents with new RLQ abscess. Reports exquisite tenderness at the site, similar to prior abscesses    Labs reviewed, elevated WBC 14.6 at admission  CT reviewed, RLQ abscess    for bedside I/D of abscess  Medical admission/care  Abx attestation entered in error

## 2023-01-03 NOTE — CONSULT NOTE ADULT - ASSESSMENT
42 year old female with right abdominal wall abscess.    Plan:  - Will attempt bedside I+D of abscess  - Pain control  - Abx and care per primary team    Discussed with surgery attending on call, Dr. Stevens.      Red Surgery  [id number] 42 year old female with right abdominal wall abscess.    Plan:  - Will attempt bedside I+D of abscess  - Pain control  - Abx and care per primary team    Discussed with surgery attending on call, Dr. Stevens.      Red Surgery  [id number]      Update s/p bedside I+D:  - Patient tolerated procedure well  - Wound packed with 1 inch iodoform packing strip  - Daily replacement of packing  - Dressing can be changed daily or more frequently prn if saturated  - F/u wound culture  - Patient will need VNS vs education about how to perform daily packing strip change upon discharge

## 2023-01-03 NOTE — ED PROVIDER NOTE - PHYSICAL EXAMINATION
A&Ox3, NAD, well appearing  Lungs CTAB. No w/r/r, breathing comfortably and speaking in full sentences, on 2L NC.   Cardiac +S1S2, RRR, No m/r/g.   Abd soft, NT/ND, +BS, no rebound or guarding.   Extremities: cap refill <2, pulses in distal extremities 4+, no edema.   Skin: RLQ with approx 8x8 cm area of induration and overlaying erythema, +TTP, no drainage or dischagre. Abd otherwise nontender, LLQ qith SQ remodulin pump with no surrounding erythema or swelling.   No focal Deficit

## 2023-01-03 NOTE — ED PROCEDURE NOTE - PROCEDURE ADDITIONAL DETAILS
Peripheral IV access in the Emergency Department obtained under dynamic ultrasound guidance with dark nonpulsatile blood return.  Catheter was flushed afterwards without any resistance or resulting extravasation.  IV catheter confirmed in compressible vein after insertion. 18 gauge catheter placed in a peripheral vein in the left upper extremity.

## 2023-01-03 NOTE — CONSULT NOTE ADULT - ASSESSMENT
Dr. Ramesh Echevarria, DO  Pulmonary and Critical Care Medicine Fellow   Available via Microsoft Teams - **Preferred**  Pulmonary Spectra #[id number] ([facility]) / [phone] ([facility])  Pager:  [phone] 42F PMHx of pulmonary HTN (group I and group IV; on Treprostinil and Xarelto), home o2 (2L), ILD, JULIA, obesity, hx of abdominal abscesses (related to treprostinil pump), RV failure, RA thrombus s/p thrombectomy, and PPM here for right wall abdominal abscess. Patient sent in by Dr. Yoon. Pulmonary consulted for management of Remodulin pump. Patient with pain around her abdominal wound. Denies dyspnea, orthopnea, worsening LE edema or increase in her o2 requirements.     Recommendations  -CW Sildenafil 20 mg po tid.   -CW Remodulin SQ pump at home dose. Patient self manages. Pharmacy will need to verify.   -CW chronic prednisone    -CW home diuretics and inhalers.   -No pulmonary contraindication to proceed with CT a/p   -No pulmonary contraindication for general surgery I&D if local anesthesia being used.  -No pulmonary contraindication to discharge if cleared by general surgery and if no change in cardiopulmonary status.  -Discussed with Dr. Yoon and Dr. Rodrigez.   -Please call if any questions.      Dr. Ramesh Echevarria, DO  Pulmonary and Critical Care Medicine Fellow   Available via Microsoft Teams - **Preferred**  Pulmonary Spectra #[id number] ([facility]) / [id number] ([facility])  Pager:  [phone]   42F PMHx of pulmonary HTN (group I and group IV; on Treprostinil and Xarelto), home o2 (2L), ILD, JULIA, obesity, hx of abdominal abscesses (related to treprostinil pump), RV failure, RA thrombus s/p thrombectomy, and PPM here for right wall abdominal abscess. Patient sent in by Dr. Yoon. Pulmonary consulted for management of Remodulin pump. Patient with pain around her abdominal wound. Denies dyspnea, orthopnea, worsening LE edema or increase in her o2 requirements.     Recommendations  -CW Sildenafil 20 mg po tid.   -CW Remodulin SQ pump at home dose. Patient self manages. Pharmacy will need to verify.   -CW chronic prednisone    -CW home diuretics and inhalers.   -No pulmonary contraindication to proceed with CT a/p   -No pulmonary contraindication for general surgery I&D if local anesthesia being used.  -Discussed with Dr. Yoon and Dr. Rodrigez.   -Please call if any questions.      Dr. Ramesh Echevarria, DO  Pulmonary and Critical Care Medicine Fellow   Available via Microsoft Teams - **Preferred**  Pulmonary Spectra #[id number] ([facility]) / [phone] ([facility])  Pager:  [phone]

## 2023-01-03 NOTE — H&P ADULT - NSHPPHYSICALEXAM_GEN_ALL_CORE
Vital Signs Last 24 Hrs  T(C): 37.3 (03 Jan 2023 23:25), Max: 37.3 (03 Jan 2023 23:25)  T(F): 99.1 (03 Jan 2023 23:25), Max: 99.1 (03 Jan 2023 23:25)  HR: 99 (03 Jan 2023 23:25) (95 - 99)  BP: 118/75 (03 Jan 2023 23:25) (97/72 - 123/81)  BP(mean): 90 (03 Jan 2023 23:25) (90 - 90)  RR: 16 (03 Jan 2023 23:25) (16 - 16)  SpO2: 100% (03 Jan 2023 23:25) (97% - 100%)    Parameters below as of 03 Jan 2023 23:25  Patient On (Oxygen Delivery Method): nasal cannula  O2 Flow (L/min): 2

## 2023-01-03 NOTE — H&P ADULT - PROBLEM SELECTOR PLAN 2
- CW Sildenafil 20 mg po tid.   - CW home Remodulin SQ pump, Pharmacy will need to verify  - CW prednisone    - CW home diuretics and inhalers  - Apprec Pulm rec, f/u for further rec - C/w Sildenafil 2  - C/w home Remodulin SQ pump, Pharmacy to verify  - C/w prednisone    - C/w home diuretics and inhalers  - Apprec Pulm rec, f/u for further rec

## 2023-01-03 NOTE — HISTORY OF PRESENT ILLNESS
[Never] : never [TextBox_4] : ------Patient is here for follow up of her pulmonary htn. ------------------INITIALLY  REFD  WITH   ECHO [DONE  OUTSIDE ] ELEVATED PASP  DILATED RV AND RA---------  HAS BEEN TOLD IN THE PAST SHE HAS  ASTHMA-----long-standing history of dyspnea on exertion-----------------has baseline tachycardia ----.....No history of fever, chills , rigors, chest pain, or hemoptysis. No h/o significant weight loss in last few months. No history of liver dysfunction , collagen vascular disorder or chronic thromboembolic disease. I would classify her dyspnea as WHO  FUNCTIONAL CLASS III-----------no calf tenderness----------SLEEP STUDY SHOWS AHI 0.6 but T 90  < 35 %-----DIAGNOSED  AS PAH ---------WAS ON ADMAPAS  NOW ON   SQ REMODULIN  [ SINCE MAY 2108]  ---S/P PACEMAKER PLACEMENT AT [place]  AND ON METOPROLOL-----\par \par \par 2/3/2022 tested positive on home covid test 2/2/2022- developed shortness of breath, nausea and vomitting- son is positive\par She is vaccinated for covid but not boosted\par Afebrile, drinking but not eating well. O2 sat 94% room air rest- 92% room air w/walk\par \par 2/7/2022 covid pcr negative - did not get MAB\par still with SOUZA - on prednisone with taper\par \par 4/2022 6mwt o2 sat 95% to 94% on oxygen 69meters (135m) stopped d/t severe SOB and elevated HR\par \par 4/15/2022  pulm htn - on remodulin 36 ng (having jaw pain sometimes), sildenafil 10 mg qd, furosemide 20 mg 5x weekly which helped w/edema. She is also having palpitations- has not yet followed up with DR Chavis in cardiology.\par Asthma-occas wheezing- awaiting start of dupixent\par Up to date w/covid vac- declines influenza vac. s/p consult with lung transplant team but needs to lower BMI before being considered- pt has been referred to nutritionist but has not scheduled appt\par \par \par 5/31/22- pulm htn - on remodulin 36 ng SQ (having jaw pain sometimes), sildenafil 10 mg qd, and diuretics daily- doing well from pulm perspectives\par Asthma currently under control- declining biologic therapy\par Was following lung  transplant team but currently on hold d/t BMI\par \par \par 7/11/22- pulm htn--on remodulin 36 ng SQ (having jaw pain sometimes), sildenafil 10 mg qd and diuretics daily [LASIX WO MG---XARELTO \par was in the hospital at [facility]-- given Lasix --- we will slowly increase her dose of Remodulin\par \par 7/22/2022 pulm htn- breathing improved on higher dose of remodulin 40ng, having some diarrhea. Palpitations improved\par \par 9/15 /2022 ----s/p hospitalization for cardiogenic shock due to RV failure with markers of end organ dysfunction,\par started on  as well as Marcela. CTA negative for PE but TTE revealed RA thrombus\par with concern for clot in transit. She underwent catheter directed partial\par thrombus aspiration 8/31, course complicated by L fem pseudoaneurysm for which\par she received thrombin injection on 9/3. Course further complicated by S.\par Lugdunensis bacteremia on BCx from 8/29 s/p drainage of abscess at prior----on IV antibiotics as per ID\par Remodulin subcutaneous access site.--42 ng/kg/min , also on sildenafil 20 mg 3 times daily\par ----\par \par \par SEPT 27 2022-----feels much better on subcu Remodulin 44 ng/kg/min and sildenafil 20 mg 3 times daily on diuretics-on anticoagulation-------- needs to see vascular for left femoral pseudoaneurysm--- H/O---S.Lugdunensis bacteremia on BCx from 8/29 s/p drainage of abscess at prior----on IV antibiotics as per ID --------ALSO HAS Lleft   fem pseudoaneurysm---needs follow up by  vasular ---- --received flu vaccine today------------------------\par \par October 2022: Telehealth Visit via teledoc\par S/P hospitalization for cardiogenic shock c/b bacteremia on IV antibiotics. Followed by ID (Dr. Grover) for labs, antibiotics now d/c'd (10/6)  getting follow up labs w/ID (results pending from 10/19)\par Pulm htn -She reports use of Remodulin 44 ng/kg/min, tolerating new dose well  and Sildenafil (20 mg t.i.d PO) No resp complaints, no palpitations, follows cardiology Dr Paramjit Chavis. On diuretics, xarelto, O2 as needed\par s/p lung transplant eval- was told she was "too well" to be listed and needs to work on getting her BMI down\par \par jan 2022--developed abdominal wall abscess--needs surgical drainage--------subcu Remodulin 44 ng/kg/min and sildenafil 20 mg 3 times daily on diuretics-on anticoagulation-- [TextBox_100] : 1/2014 [TextBox_108] : 0.9 [TextBox_112] : 35.3

## 2023-01-03 NOTE — H&P ADULT - PROBLEM SELECTOR PLAN 1
- P/W RLQ abm pain at injection site  - Afebrile, VSS  - f/u bcx , ucx   - Planned for local I&D by surg   - Pain control  - IV abx - P/W RLQ abm pain at injection site  - Afebrile, VSS  - Elevated wbc  - f/u bcx , ucx   - Planned for local I&D by surg   - Pain control  - IV abx  - Wound care - P/W RLQ abm pain at injection site  - Afebrile, VSS  - Elevated wbc  - CT abm: 4.6 cm RLQ abdominal wall abscess   - S/p beside I&D by surg, per note incision left open and packed    Exam of site sig for serosanguinous drainage on dsg    - Apprec surg rec, f/u for further rec   - F/u abscess cx , Bcx, Ucx   - Pain control prn tylenol   - s/p cefazolin & Doxycycline in ED   - Review of Cx from 9/2022 w/ Mod MRSA sensitive to doxy, will c/w doxycycline 100mg q12   - ID consult to be called in AM

## 2023-01-03 NOTE — CONSULT NOTE ADULT - SUBJECTIVE AND OBJECTIVE BOX
General Surgery Consult  Consulting surgical team: [facility]  Consulting attending: Dr. Rao    HPI:  Patient is a 42 year old female with PMHx significant for pulmonary HTN requiring home O2, right atrial thrombus for which she is taking Xarelto, and multiple skin abscesses presenting for an abdominal wall abscess. Patient states she has had abdominal wall skin changes and pain for the past 4-5 days (since Saturday). Worsening swelling to the RLQ. Previously had remodulin SQ in RLQ, however, was transitioned to LLQ after onset of pain. In ED, CT demonstrates 4.6cm abdominal wall abscess. Surgery consulted for evaluation and recommendations.    Patient denies nausea, vomiting, fevers, chills, and drainage from abscess site.        PAST MEDICAL HISTORY:  Asthma    Pulmonary hypertension    Tachycardia    Anemia    Pulmonary hypertension    Pulmonary embolism    Asthma    Sinus tachycardia    Asthma with status asthmaticus, unspecified asthma severity    PE (pulmonary thromboembolism)    Keratoconus    Sinusitis    Corneal disorder    Right heart failure    CAD (coronary artery disease)    H/O pulmonary hypertension    Pulmonary embolism    Asthma    History of tachycardia    On supplemental oxygen by nasal cannula    Pacemaker    Skin mass        PAST SURGICAL HISTORY:  History of tubal ligation    No significant past surgical history    Pacemaker    H/O tubal ligation    History of pacemaker        MEDICATIONS:  doxycycline IVPB      lidocaine 1% Injectable 40 milliLiter(s) Local Injection once      ALLERGIES:  adhesives (Rash)  albuterol (Other; Rash)  penicillin (Rash)  vancomycin (Rash)      VITALS & I/Os:  Vital Signs Last 24 Hrs  T(C): 37.1 (03 Jan 2023 16:06), Max: 37.1 (03 Jan 2023 16:06)  T(F): 98.7 (03 Jan 2023 16:06), Max: 98.7 (03 Jan 2023 16:06)  HR: 97 (03 Jan 2023 16:06) (95 - 97)  BP: 97/72 (03 Jan 2023 16:06) (97/72 - 123/81)  BP(mean): --  RR: 16 (03 Jan 2023 16:06) (16 - 16)  SpO2: 100% (03 Jan 2023 16:06) (97% - 100%)    Parameters below as of 03 Jan 2023 16:06  Patient On (Oxygen Delivery Method): nasal cannula  O2 Flow (L/min): 2      I&O's Summary      PHYSICAL EXAM:  Gen: NAD  CV: Regular rate  Resp: Nasal cannula in place  Abd: RLQ skin changes present, fluctuance palpated, exquisite tenderness to palpation  Ext: Warm      LABS:                        13.1   14.69 )-----------( 357      ( 03 Jan 2023 16:27 )             42.8     01-03    136  |  101  |  8   ----------------------------<  76  3.4<L>   |  23  |  0.97    Ca    8.5      03 Jan 2023 16:27    TPro  7.5  /  Alb  4.3  /  TBili  0.9  /  DBili  x   /  AST  10  /  ALT  8<L>  /  AlkPhos  64  01-03    Lactate:    PT/INR - ( 03 Jan 2023 16:27 )   PT: 17.3 sec;   INR: 1.49 ratio         PTT - ( 03 Jan 2023 16:27 )  PTT:42.8 sec              IMAGING:  < from: CT Abdomen and Pelvis w/ IV Cont (01.03.23 @ 17:56) >    ACC: [id number] EXAM:  CT ABDOMEN AND PELVIS IC                          PROCEDURE DATE:  01/03/2023          INTERPRETATION:  CLINICAL INFORMATION: Right-sided abdominal soft tissues   abscess    COMPARISON: CT abdomen and pelvis 9/9/2022    CONTRAST/COMPLICATIONS:  IV Contrast: Omnipaque 350  90 cc administered   10 cc discarded  Oral Contrast: NONE  Complications: None reported at time of study completion    PROCEDURE:  CT of the Abdomen and Pelvis was performed.  Sagittal and coronal reformats were performed.    FINDINGS:  LOWER CHEST: Stable bilateral patchy groundglass opacities and enlarged   pulmonary arteries. Partially visualized cardiac leads.    LIVER: Within normal limits.  BILE DUCTS: Normal caliber.  GALLBLADDER: Within normal limits.  SPLEEN: Within normal limits.  PANCREAS: Within normal limits.  ADRENALS: Within normal limits.  KIDNEYS/URETERS: Symmetric enhancement. No hydronephrosis.    BLADDER: Decompressed.  REPRODUCTIVE ORGANS: IUD in place. Adnexa within normal limits.    BOWEL: No bowel obstruction. Appendix is normal. Colonic diverticulosis   without acute diverticulitis.  PERITONEUM: No ascites.  VESSELS: Within normal limits.  RETROPERITONEUM/LYMPH NODES: No lymphadenopathy.  ABDOMINAL WALL: Subcutaneous low density rim-enhancing collection along   the right side of the abdominal wall measuring 4.6 x 3.4 x 3.3 cm with   associated skin thickening. There is skin thickening along the left   abdominal wall with associated 7 mm focus of low density. There is   subcutaneous fat stranding bilaterally. Left groin hematoma has resolved.  BONES: Degenerative changes.    IMPRESSION:    1.  4.6 cm right lower quadrant anterior abdominal wall abscess with   surrounding inflammation and skin thickening.    2.  Left lower quadrant abdominal wall skin thickening with tiny   associated subcutaneous collection and overlying medical device appears   similar to prior.    --- End of Report ---           JENNIFER LANDERS MD; Resident Radiologist  This document has been electronically signed.  KERI BROCK MD; Attending Radiologist  This document has been electronically signed. Thang  3 2023  7:28PM    < end of copied text >

## 2023-01-03 NOTE — ED PROVIDER NOTE - CLINICAL SUMMARY MEDICAL DECISION MAKING FREE TEXT BOX
BETTY Nicole MD: Pt is a 43 y/o female with PMH pulmonary HTN on 2L NC chronically, remodulin continuous infusion SQ pump, s/p PPM, RA thrombus on xarelto, s/p thrombectomy, h/o recurrent skin abscesses who presents for R sided abdominal wall infection. Pt reports that she began to develop progressively worsening swelling, redness pain around her remodulin pump site last week, so she moved it to L side of abdomen. Saw her Pulm Dr. Yoon today who sent pt to ED for eval. On exam, pt appears to have an area of erythema, swelling, tenderness and fluctuance in RLQ concerning for cutaneous abscess. Plan for CT imaging to eval for deeper infection, sepsis labs, IV abx and surgery consult. Will consult pulmonology given that pt has remodulin pump. Plan to admit

## 2023-01-03 NOTE — ED PROVIDER NOTE - OBJECTIVE STATEMENT
41 yo female with extensive medical history including pHTN on 2L O2 and remodulin SQ pump, PPM, 41 yo female with extensive medical history including pHTN on 2L O2 and remodulin SQ pump, PPM, RA thrombus on xarelto s/p thrombectomy, multiple skin abscess formations here for evaluation of abdominal wall abscess. Pt usually has remodulin pump to RLQ, noted last week to develop pain and swelling to that area and changed pump site to the LLQ. Since then started to develop worsening pain and swelling to the RLQ abdominal area, went to see her pulmonologist/PCP Dr. Yoon who recommended she come to the ED for evaluation of the abscess. Pt denies fevers, chills, lethargy, back pain, nausea, vomiting, no purulence or drainage from the abscess site.

## 2023-01-03 NOTE — ED PROVIDER NOTE - PROGRESS NOTE DETAILS
spoke with pulm fellow, aware of pt, will come see, states remodulin pump she has currently can stay in place, ok for CT scan will come see pt. -Emilie Syed PA-C spoke with surgery in regards to abdominal wall abscess, will come see patient. -Emilie Syed PA-C

## 2023-01-03 NOTE — DISCUSSION/SUMMARY
[FreeTextEntry1] : ----Assessment and Plan--------41 year-old lady with PULMONARY ARTERIAL HYPERTENSION S/P PPM FOR TACHYCARDIA AT [facility] ON REMODULIN - S/P LEFT BREAST BIOPSY \par  doing well on remodulin 38 ng ----- ON XARELTO, LASIX AND ALDACTONE -------LOW DOSE PREDNISONE -----is following up with at [facility] for transplant listing-----final decision after the  and psych evaluation\par \par JAN 2023---- ABDOMINAL WALL ABSCESS-----needs surgical drainage---SURGICAL CONSULTATION IN ER FOR DRAINAGE\par WILL SEND HER TO ER FOR DRAINAGE OF MERCEDES ABSCESS- - -\par \par 1. PULMONARY ART HTN   ---- WHO GROUP I FUNCTIONAL CLASS III ----\par  [AS PER DR FREEMAN WHO DID PULM ANGIO SHE HAS FEATURES OF GROUP I WITH SOME   EVIDENCE OF DISTAL CLOTS] \par  She is on Remodulin 44 ng/kg/min  -\par  Continue  Sildenafil 20mg qd, Xarelto, ON Lasix 20 mg 5 TIMES A WEEK AND ALDACTONE 25 MG \par \par 2. Asthma -. use  ipratropium and levalbuterol and budesonide. Refuses use of DUPIXENT at this time as she reports her allergies are "not that bad". Has has been steroid dependent likely contributing to her weight. She is hesitant to start biologic injections. \par Has discontinued Flonase and Dymysta nasal spray because of reports epistaxis \par Patient educated about persistent epistaxis to notify team and we will refer to ENT\par \par 3. High BMI (41.9 kg/m2)\par   Precluding lung transplant\par   Referred to nutritionist\par   Weight loss program advised\par \par 4. Oxygen medically necessary given severe Pulm Htn, anemia and h/o nocturnal desaturation which could worsen Pulm HTN. Advised oxygen 2 lpm x 24hrs\par \par 5. She is on Xarelto---will be on lifelong anticoagulation---\par \par 6) She has features of ERICA- including sleep disturbance, nocturnal desat, and excessive fatigue- HST was negative for ERICA in 2014 but had desaturation. Will try to repeat HST at next visit\par \par 8. Follows /[facility]\par   ---Also consulted with Dr Lou at [facility]---\par   As per patient, Dr Lou feels she is not a candidate at this time because of good health\par   Although Dr. Lou advised needs to lose wt to be considered for transplant listing in future\par \par 9. ABDOMINAL WALL ABSCESS-----needs surgical drainage---SURGICAL CONSULTATAION IN ER FOR DRAINAGE--------\par \par 9. F/U: in OFFICE ON DISCHARGE \par \par \par Vivian Yoon MD, FCCP \par Director, Pulmonary Hypertension Program \par [facility] \par Division of Pulmonary, Critical Care and Sleep Medicine \par  Professor of Medicine \par [employer]\par \par \par VANDANA Harrell-C\par

## 2023-01-04 DIAGNOSIS — Z02.9 ENCOUNTER FOR ADMINISTRATIVE EXAMINATIONS, UNSPECIFIED: ICD-10-CM

## 2023-01-04 DIAGNOSIS — I25.10 ATHEROSCLEROTIC HEART DISEASE OF NATIVE CORONARY ARTERY WITHOUT ANGINA PECTORIS: ICD-10-CM

## 2023-01-04 DIAGNOSIS — J45.909 UNSPECIFIED ASTHMA, UNCOMPLICATED: ICD-10-CM

## 2023-01-04 LAB
ALBUMIN SERPL ELPH-MCNC: 3.4 G/DL — SIGNIFICANT CHANGE UP (ref 3.3–5)
ALP SERPL-CCNC: 57 U/L — SIGNIFICANT CHANGE UP (ref 40–120)
ALT FLD-CCNC: 8 U/L — LOW (ref 10–45)
ANION GAP SERPL CALC-SCNC: 13 MMOL/L — SIGNIFICANT CHANGE UP (ref 5–17)
AST SERPL-CCNC: 12 U/L — SIGNIFICANT CHANGE UP (ref 10–40)
BILIRUB SERPL-MCNC: 0.8 MG/DL — SIGNIFICANT CHANGE UP (ref 0.2–1.2)
BUN SERPL-MCNC: 8 MG/DL — SIGNIFICANT CHANGE UP (ref 7–23)
CALCIUM SERPL-MCNC: 8.5 MG/DL — SIGNIFICANT CHANGE UP (ref 8.4–10.5)
CHLORIDE SERPL-SCNC: 102 MMOL/L — SIGNIFICANT CHANGE UP (ref 96–108)
CO2 SERPL-SCNC: 20 MMOL/L — LOW (ref 22–31)
CREAT SERPL-MCNC: 0.93 MG/DL — SIGNIFICANT CHANGE UP (ref 0.5–1.3)
EGFR: 79 ML/MIN/1.73M2 — SIGNIFICANT CHANGE UP
GLUCOSE SERPL-MCNC: 78 MG/DL — SIGNIFICANT CHANGE UP (ref 70–99)
HCT VFR BLD CALC: 37.5 % — SIGNIFICANT CHANGE UP (ref 34.5–45)
HGB BLD-MCNC: 11.4 G/DL — LOW (ref 11.5–15.5)
INR BLD: 1.23 RATIO — HIGH (ref 0.88–1.16)
MCHC RBC-ENTMCNC: 20.8 PG — LOW (ref 27–34)
MCHC RBC-ENTMCNC: 30.4 GM/DL — LOW (ref 32–36)
MCV RBC AUTO: 68.3 FL — LOW (ref 80–100)
MRSA PCR RESULT.: SIGNIFICANT CHANGE UP
NRBC # BLD: 0 /100 WBCS — SIGNIFICANT CHANGE UP (ref 0–0)
PLATELET # BLD AUTO: 313 K/UL — SIGNIFICANT CHANGE UP (ref 150–400)
POTASSIUM SERPL-MCNC: 4.2 MMOL/L — SIGNIFICANT CHANGE UP (ref 3.5–5.3)
POTASSIUM SERPL-SCNC: 4.2 MMOL/L — SIGNIFICANT CHANGE UP (ref 3.5–5.3)
PROT SERPL-MCNC: 6.5 G/DL — SIGNIFICANT CHANGE UP (ref 6–8.3)
PROTHROM AB SERPL-ACNC: 14.2 SEC — HIGH (ref 10.5–13.4)
RBC # BLD: 5.49 M/UL — HIGH (ref 3.8–5.2)
RBC # FLD: 20.4 % — HIGH (ref 10.3–14.5)
S AUREUS DNA NOSE QL NAA+PROBE: SIGNIFICANT CHANGE UP
SODIUM SERPL-SCNC: 135 MMOL/L — SIGNIFICANT CHANGE UP (ref 135–145)
WBC # BLD: 15.02 K/UL — HIGH (ref 3.8–10.5)
WBC # FLD AUTO: 15.02 K/UL — HIGH (ref 3.8–10.5)

## 2023-01-04 PROCEDURE — 99223 1ST HOSP IP/OBS HIGH 75: CPT

## 2023-01-04 PROCEDURE — 99222 1ST HOSP IP/OBS MODERATE 55: CPT

## 2023-01-04 PROCEDURE — 99232 SBSQ HOSP IP/OBS MODERATE 35: CPT

## 2023-01-04 RX ORDER — ACETAMINOPHEN 500 MG
650 TABLET ORAL EVERY 6 HOURS
Refills: 0 | Status: DISCONTINUED | OUTPATIENT
Start: 2023-01-04 | End: 2023-01-05

## 2023-01-04 RX ORDER — SENNA PLUS 8.6 MG/1
2 TABLET ORAL AT BEDTIME
Refills: 0 | Status: DISCONTINUED | OUTPATIENT
Start: 2023-01-04 | End: 2023-01-05

## 2023-01-04 RX ORDER — LANOLIN ALCOHOL/MO/W.PET/CERES
3 CREAM (GRAM) TOPICAL AT BEDTIME
Refills: 0 | Status: DISCONTINUED | OUTPATIENT
Start: 2023-01-04 | End: 2023-01-05

## 2023-01-04 RX ORDER — IPRATROPIUM BROMIDE 0.2 MG/ML
1 SOLUTION, NON-ORAL INHALATION
Refills: 0 | Status: DISCONTINUED | OUTPATIENT
Start: 2023-01-04 | End: 2023-01-05

## 2023-01-04 RX ORDER — ONDANSETRON 8 MG/1
4 TABLET, FILM COATED ORAL EVERY 8 HOURS
Refills: 0 | Status: DISCONTINUED | OUTPATIENT
Start: 2023-01-04 | End: 2023-01-05

## 2023-01-04 RX ORDER — IPRATROPIUM BROMIDE 0.2 MG/ML
500 SOLUTION, NON-ORAL INHALATION ONCE
Refills: 0 | Status: COMPLETED | OUTPATIENT
Start: 2023-01-04 | End: 2023-01-04

## 2023-01-04 RX ORDER — VANCOMYCIN HCL 1 G
1000 VIAL (EA) INTRAVENOUS EVERY 12 HOURS
Refills: 0 | Status: DISCONTINUED | OUTPATIENT
Start: 2023-01-04 | End: 2023-01-04

## 2023-01-04 RX ORDER — PANTOPRAZOLE SODIUM 20 MG/1
40 TABLET, DELAYED RELEASE ORAL
Refills: 0 | Status: DISCONTINUED | OUTPATIENT
Start: 2023-01-04 | End: 2023-01-05

## 2023-01-04 RX ORDER — DIPHENHYDRAMINE HCL 50 MG
50 CAPSULE ORAL ONCE
Refills: 0 | Status: DISCONTINUED | OUTPATIENT
Start: 2023-01-04 | End: 2023-01-04

## 2023-01-04 RX ORDER — POTASSIUM CHLORIDE 20 MEQ
20 PACKET (EA) ORAL
Refills: 0 | Status: COMPLETED | OUTPATIENT
Start: 2023-01-04 | End: 2023-01-04

## 2023-01-04 RX ORDER — DIPHENHYDRAMINE HCL 50 MG
50 CAPSULE ORAL ONCE
Refills: 0 | Status: COMPLETED | OUTPATIENT
Start: 2023-01-04 | End: 2023-01-04

## 2023-01-04 RX ORDER — FUROSEMIDE 40 MG
20 TABLET ORAL DAILY
Refills: 0 | Status: DISCONTINUED | OUTPATIENT
Start: 2023-01-04 | End: 2023-01-05

## 2023-01-04 RX ORDER — SODIUM CHLORIDE 9 MG/ML
500 INJECTION INTRAMUSCULAR; INTRAVENOUS; SUBCUTANEOUS ONCE
Refills: 0 | Status: DISCONTINUED | OUTPATIENT
Start: 2023-01-04 | End: 2023-01-05

## 2023-01-04 RX ORDER — PREGABALIN 225 MG/1
1 CAPSULE ORAL
Qty: 0 | Refills: 0 | DISCHARGE

## 2023-01-04 RX ORDER — DIPHENHYDRAMINE HCL 50 MG
50 CAPSULE ORAL ONCE
Refills: 0 | Status: COMPLETED | OUTPATIENT
Start: 2023-01-04

## 2023-01-04 RX ORDER — RIVAROXABAN 15 MG-20MG
20 KIT ORAL
Refills: 0 | Status: DISCONTINUED | OUTPATIENT
Start: 2023-01-04 | End: 2023-01-05

## 2023-01-04 RX ORDER — CHLORHEXIDINE GLUCONATE 213 G/1000ML
1 SOLUTION TOPICAL
Refills: 0 | Status: DISCONTINUED | OUTPATIENT
Start: 2023-01-04 | End: 2023-01-05

## 2023-01-04 RX ORDER — SPIRONOLACTONE 25 MG/1
25 TABLET, FILM COATED ORAL DAILY
Refills: 0 | Status: DISCONTINUED | OUTPATIENT
Start: 2023-01-04 | End: 2023-01-05

## 2023-01-04 RX ADMIN — PANTOPRAZOLE SODIUM 40 MILLIGRAM(S): 20 TABLET, DELAYED RELEASE ORAL at 06:32

## 2023-01-04 RX ADMIN — Medication 10 MILLIGRAM(S): at 06:32

## 2023-01-04 RX ADMIN — Medication 50 MILLIGRAM(S): at 20:19

## 2023-01-04 RX ADMIN — Medication 20 MILLIGRAM(S): at 17:48

## 2023-01-04 RX ADMIN — Medication 100 MILLIGRAM(S): at 06:33

## 2023-01-04 RX ADMIN — SPIRONOLACTONE 25 MILLIGRAM(S): 25 TABLET, FILM COATED ORAL at 08:51

## 2023-01-04 RX ADMIN — Medication 100 MILLIGRAM(S): at 17:47

## 2023-01-04 RX ADMIN — Medication 20 MILLIGRAM(S): at 06:32

## 2023-01-04 RX ADMIN — Medication 20 MILLIGRAM(S): at 08:50

## 2023-01-04 RX ADMIN — Medication 1 PUFF(S): at 20:00

## 2023-01-04 RX ADMIN — Medication 20 MILLIEQUIVALENT(S): at 03:30

## 2023-01-04 RX ADMIN — Medication 500 MICROGRAM(S): at 12:51

## 2023-01-04 RX ADMIN — Medication 20 MILLIEQUIVALENT(S): at 05:14

## 2023-01-04 RX ADMIN — RIVAROXABAN 20 MILLIGRAM(S): KIT at 17:49

## 2023-01-04 RX ADMIN — Medication 83.33 MILLIGRAM(S): at 20:13

## 2023-01-04 NOTE — CONSULT NOTE ADULT - ASSESSMENT
42 year old Female with PMHx of pulmHTN on 2L O2, on Remodulin pump, ILD, PPM, RA thrombus on xarelto s/p thrombectomy, multiple skin abscess, recent admission in august/September 2022 for abdominal skin abscess with MRSA and bacteremia with Staph Lugdunensis treated with doxy for 10 days and 4 weeks of Cefazolin presented on 1/3 with abdominal wall swelling.    Afebrile  Leukocytosis 14K --> 15K  CT AP on 1/3: 4.6 cm right lower quadrant anterior abdominal wall abscess, Left lower quadrant abdominal wall skin thickening with tiny associated subcutaneous collection and overlying medical device  S/p I&D by surgery on 1/4 with bloody and purulent drainage with Cx pending  Blood Cx sent on 12/3  MRSA PCR neg on 1/3   Started on doxy (1/3-->)    #RLQ abdominal wall abscess at site of Remodulin pump s/p I&D on 1/4  #Recurrent skin abscesses with MRSA   #Allergy to ?Vancomycin     Recommendations: Not final until discussed with attending   -Continue Doxy  -F/up abscess Cx sent on 1/4  -F/up blood Cx X 2 sent on 1/3  -Surgery following   -Outpatient ID f/up for staph decolonization trial   -Monitor T curve and WBC trend     PT TO BE SEEN. PRELIM NOTE  PENDING RECS. PLEASE WAIT FOR FINAL RECS AFTER DISCUSSION WITH ATTENDING#     42 year old Female with PMHx of pulmHTN on 2L O2, on Remodulin pump, ILD, PPM, RA thrombus on xarelto s/p thrombectomy, multiple skin abscess, recent admission in august/September 2022 for abdominal skin abscess with MRSA and bacteremia with Staph Lugdunensis treated with doxy for 10 days and 4 weeks of Cefazolin presented on 1/3 with abdominal wall swelling.    Afebrile  Leukocytosis 14K --> 15K  CT AP on 1/3: 4.6 cm right lower quadrant anterior abdominal wall abscess, Left lower quadrant abdominal wall skin thickening with tiny associated subcutaneous collection and overlying medical device  S/p I&D by surgery on 1/4 with bloody and purulent drainage with Cx pending  Blood Cx sent on 12/3  MRSA PCR neg on 1/3   Started on doxy (1/3-->)    #RLQ abdominal wall abscess at site of Remodulin pump s/p I&D on 1/4  #Recurrent skin abscesses with MRSA   #Allergy to Vancomycin -?Red man syndrome      Recommendations:  -Vancomycin 1 g IV q 12hrs (slow infusion)  -If develops allergic reaction, can do doxycycline   -F/up abscess Cx sent on 1/4  -F/up blood Cx X 2 sent on 1/3  -Surgery following   -Monitor T curve and WBC trend     Seen and discussed with Dr Mariam Hamilton MD, PGY4  ID fellow  [employer] Teams Preferred  After 5pm/weekends call [phone]

## 2023-01-04 NOTE — CONSULT NOTE ADULT - SUBJECTIVE AND OBJECTIVE BOX
HPI:    42 year old Female with PMHx of pulmHTN on 2L O2, on Remodulin pump, ILD, PPM, RA thrombus on xarelto s/p thrombectomy, multiple skin abscess presented on 1/3 with abdominal wall swelling. Pt usually has remodulin pump to RLQ, noted last week to develop pain and swelling to that area and changed pump site to the LLQ. Since then started to develop worsening pain and swelling to the RLQ abdominal area, went to see her pulmonologist/PCP Dr. Yoon who recommend she come to the ED for evaluation of the abscess.     In ED: doxycycline 100mg, ciprofloxacin, IV Tylenol, Dilaudid 0.5mg  (       REVIEW OF SYSTEMS  [  ] ROS unobtainable because:    [  ] All other systems negative except as noted below    Constitutional:  [ ] fever [ ] chills  [ ] weight loss  [ ]night sweat  [ ]poor appetite/PO intake [ ]fatigue   Skin:  [ ] rash [ ] phlebitis	  Eyes: [ ] icterus [ ] pain  [ ] discharge	  ENMT: [ ] sore throat  [ ] thrush [ ] ulcers [ ] exudates [ ]anosmia  Respiratory: [ ] dyspnea [ ] hemoptysis [ ] cough [ ] sputum	  Cardiovascular:  [ ] chest pain [ ] palpitations [ ] edema	  Gastrointestinal:  [ ] nausea [ ] vomiting [ ] diarrhea [ ] constipation [ ] pain	  Genitourinary:  [ ] dysuria [ ] frequency [ ] hematuria [ ] discharge [ ] flank pain  [ ] incontinence  Musculoskeletal:  [ ] myalgias [ ] arthralgias [ ] arthritis  [ ] back pain  Neurological:  [ ] headache [ ] weakness [ ] seizures  [ ] confusion/altered mental status    prior hospital charts reviewed [V]  primary team notes reviewed [V]  other consultant notes reviewed [V]    PAST MEDICAL & SURGICAL HISTORY:  Tachycardia    Anemia    Pulmonary hypertension    Pulmonary embolism    Asthma  On home O2 nightly at 2L via NC    PE (pulmonary thromboembolism)  on Xarelto 10 mg daily    Sinusitis    Corneal disorder    Right heart failure    CAD (coronary artery disease)    H/O pulmonary hypertension    Pulmonary embolism    Asthma    History of tachycardia    On supplemental oxygen by nasal cannula  O2 @ 2L    Pacemaker    Skin mass  left upper thigh mass    History of tubal ligation    Pacemaker    H/O tubal ligation    History of pacemaker  12/19 - [employer] model Ingevity 4444    SOCIAL HISTORY:  - Denied smoking/vaping/alcohol/recreational drug use    FAMILY HISTORY:  Family history of diabetes mellitus  in brother    Family history of diabetes mellitus in mother    FH: anemia    Allergies  adhesives (Rash)  penicillin (Rash)  vancomycin (Rash)    ANTIMICROBIALS:  doxycycline IVPB    doxycycline IVPB 100 every 12 hours    ANTIMICROBIALS (past 90 days):  MEDICATIONS  (STANDING):  ceFAZolin   IVPB   100 mL/Hr IV Intermittent (01-03-23 @ 23:49)    doxycycline IVPB   100 mL/Hr IV Intermittent (01-03-23 @ 16:02)    doxycycline IVPB   100 mL/Hr IV Intermittent (01-04-23 @ 06:33)    OTHER MEDS:   MEDICATIONS  (STANDING):  acetaminophen     Tablet .. 650 every 6 hours PRN  aluminum hydroxide/magnesium hydroxide/simethicone Suspension 30 every 4 hours PRN  furosemide    Tablet 20 daily  ipratropium 17 MICROgram(s) HFA Inhaler 1 four times a day  melatonin 3 at bedtime PRN  ondansetron Injectable 4 every 8 hours PRN  pantoprazole    Tablet 40 before breakfast  predniSONE   Tablet 10 daily  rivaroxaban 20 with dinner  senna 2 at bedtime  sildenafil (REVATIO) 20 every 8 hours  spironolactone 25 daily    VITALS:  Vital Signs Last 24 Hrs  T(F): 98.2 (01-04-23 @ 10:41), Max: 99.1 (01-03-23 @ 23:25)    Vital Signs Last 24 Hrs  HR: 88 (01-04-23 @ 10:41) (88 - 99)  BP: 96/67 (01-04-23 @ 10:41) (96/67 - 123/81)  RR: 20 (01-04-23 @ 10:41)  SpO2: 97% (01-04-23 @ 10:41) (97% - 100%)  Wt(kg): --    EXAM:  Physical Exam:  Constitutional:  well preserved, comfortable  Head/Eyes: no icterus, PERRL, EOMI  ENT:  supple; no thrush  LUNGS:  CTA  CVS:  normal S1, S2, no murmur  Abd:  soft, non-tender; non-distended  Ext:  no edema  Vascular:  IV site no erythema tenderness or discharge  MSK:  joints without swelling  Neuro: AAO X 3, non- focal    Labs:                        11.4   15.02 )-----------( 313      ( 04 Jan 2023 07:34 )             37.5     01-04    135  |  102  |  8   ----------------------------<  78  4.2   |  20<L>  |  0.93    Ca    8.5      04 Jan 2023 07:34    TPro  6.5  /  Alb  3.4  /  TBili  0.8  /  DBili  x   /  AST  12  /  ALT  8<L>  /  AlkPhos  57  01-04    WBC Trend:  WBC Count: 15.02 (01-04-23 @ 07:34)  WBC Count: 14.69 (01-03-23 @ 16:27)    Auto Neutrophil #: 11.83 K/uL (01-03-23 @ 16:27)  Auto Neutrophil #: 9.48 K/uL (09-13-22 @ 07:40)  Auto Neutrophil #: 12.04 K/uL (09-09-22 @ 12:48)  Auto Neutrophil #: 9.22 K/uL (09-08-22 @ 04:26)  Auto Neutrophil #: 11.92 K/uL (09-07-22 @ 07:21)    Creatine Trend:  Creatinine, Serum: 0.93 (01-04)  Creatinine, Serum: 0.97 (01-03)    Liver Biochemical Testing Trend:  Alanine Aminotransferase (ALT/SGPT): 8 *L* (01-04)  Alanine Aminotransferase (ALT/SGPT): 8 *L* (01-03)  Alanine Aminotransferase (ALT/SGPT): 26 (09-12)  Alanine Aminotransferase (ALT/SGPT): 29 (09-11)  Alanine Aminotransferase (ALT/SGPT): 41 (09-10)  Aspartate Aminotransferase (AST/SGOT): 12 (01-04-23 @ 07:34)  Aspartate Aminotransferase (AST/SGOT): 10 (01-03-23 @ 16:27)  Aspartate Aminotransferase (AST/SGOT): 22 (09-12-22 @ 07:27)  Aspartate Aminotransferase (AST/SGOT): 21 (09-11-22 @ 10:56)  Aspartate Aminotransferase (AST/SGOT): 25 (09-10-22 @ 11:53)  Bilirubin Total, Serum: 0.8 (01-04)  Bilirubin Total, Serum: 0.9 (01-03)  Bilirubin Total, Serum: 0.6 (09-12)  Bilirubin Total, Serum: 0.5 (09-11)  Bilirubin Total, Serum: 0.6 (09-10)    Trend LDH    Auto Eosinophil %: 1.8 % (01-03-23 @ 16:27)    MICROBIOLOGY:    MRSA PCR Result.: NotDetec (01-03-23 @ 16:45)  MRSA PCR Result.: Detected (08-29-22 @ 19:22)    Culture - Abscess with Gram Stain (collected 08 Sep 2022 20:12)  Source: .Abscess Skin  Final Report:    Moderate Methicillin Resistant Staphylococcus aureus  Organism: Methicillin resistant Staphylococcus aureus  Organism: Methicillin resistant Staphylococcus aureus    Sensitivities:      -  Ampicillin/Sulbactam: R 16/8      -  Cefazolin: R >16      -  Clindamycin: R >4      -  Daptomycin: S 1      -  Erythromycin: R >4      -  Gentamicin: S <=1 Should not be used as monotherapy      -  Linezolid: S 2      -  Oxacillin: R >2      -  Penicillin: R >8      -  Rifampin: S <=1 Should not be used as monotherapy      -  Tetra/Doxy: S <=1      -  Trimethoprim/Sulfamethoxazole: S <=0.5/9.5      -  Vancomycin: S 2      Method Type: BRIAN    Culture - Abscess with Gram Stain (collected 08 Sep 2022 20:12)  Source: .Abscess Skin  Final Report:    Few Methicillin Resistant Staphylococcus aureus  Organism: Methicillin resistant Staphylococcus aureus  Organism: Methicillin resistant Staphylococcus aureus    Sensitivities:      -  Ampicillin/Sulbactam: R >16/8      -  Cefazolin: R >16      -  Clindamycin: R >4      -  Daptomycin: S 1      -  Erythromycin: R >4      -  Gentamicin: S <=1 Should not be used as monotherapy      -  Linezolid: S 2      -  Oxacillin: R >2      -  Penicillin: R >8      -  Rifampin: S <=1 Should not be used as monotherapy      -  Tetra/Doxy: S <=1      -  Trimethoprim/Sulfamethoxazole: S <=0.5/9.5      -  Vancomycin: S 2      Method Type: BRIAN    Culture - Blood (collected 07 Sep 2022 17:37)  Source: .Blood Blood-Venous  Final Report:    No Growth Final    Culture - Blood (collected 07 Sep 2022 17:25)  Source: .Blood Blood-Venous  Final Report:    No Growth Final    Culture - Sputum (collected 31 Aug 2022 20:45)  Source: .Sputum Sputum  Final Report:    Normal Respiratory Laura present    Culture - Blood (collected 31 Aug 2022 00:12)  Source: .Blood Blood-Peripheral  Final Report:    No Growth Final    Culture - Blood (collected 31 Aug 2022 00:12)  Source: .Blood Blood-Peripheral  Final Report:    No Growth Final    Culture - Blood (collected 29 Aug 2022 22:00)  Source: .Blood Blood-Peripheral  Final Report:    No Growth Final    Culture - Blood (collected 29 Aug 2022 19:00)  Source: .Blood Blood-Peripheral  Final Report:    Growth in aerobic and anaerobic bottles: Staphylococcus lugdunensis    ***Blood Panel PCR results on this specimen are available    approximately 3 hours after the Gram stain result.***    Gram stain, PCR, and/or culture results may not always    correspond due to difference in methodologies.    ************************************************************    This PCR assay was performed by multiplex PCR. This    Assay tests for 66 bacterial and resistance gene targets.    Please refer to the [facility] test directory    at [email or web address] for details.  Organism: Blood Culture PCR  Staphylococcus lugdunensis  Organism: Staphylococcus lugdunensis    Sensitivities:      -  Ampicillin/Sulbactam: S <=8/4      -  Cefazolin: S <=4      -  Clindamycin: R <=0.25 This isolate is presumed to be clindamycin resistant based on detection of inducible resistance. Clindamycin may still be effective in some patients.      -  Erythromycin: R >4      -  Gentamicin: S <=1 Should not be used as monotherapy      -  Oxacillin: S 0.5      -  Penicillin: R 4      -  Rifampin: S <=1 Should not be used as monotherapy      -  Tetra/Doxy: S <=1      -  Trimethoprim/Sulfamethoxazole: S <=0.5/9.5      -  Vancomycin: S 0.5      Method Type: BRIAN  Organism: Blood Culture PCR    Sensitivities:      -  Staphylococcus lugdunensis: Detec      Method Type: PCR    RADIOLOGY:  imaging below personally reviewed    < from: CT Abdomen and Pelvis w/ IV Cont (01.03.23 @ 17:56) >  IMPRESSION:    1.  4.6 cm right lower quadrant anterior abdominal wall abscess with   surrounding inflammation and skin thickening.    2.  Left lower quadrant abdominal wall skin thickening with tiny   associated subcutaneous collection and overlying medical device appears   similar to prior.    --- End of Report ---      < end of copied text >           HPI:    42 year old Female with PMHx of pulmHTN on 2L O2, on Remodulin pump, ILD, PPM, RA thrombus on xarelto s/p thrombectomy, multiple skin abscess presented on 1/3 with abdominal wall swelling. Pt usually has remodulin pump to RLQ, noted last week to develop pain and swelling to that area and changed pump site to the LLQ. Since then started to develop worsening pain and swelling to the RLQ abdominal area, went to see her pulmonologist/PCP Dr. Yoon who recommend she come to the ED for evaluation of the abscess.   ID consulted for further recommendations.      REVIEW OF SYSTEMS  [  ] ROS unobtainable because:    [X] All other systems negative except as noted below    Constitutional:  [ ] fever [ ] chills  [ ] weight loss  [ ]night sweat  [ ]poor appetite/PO intake [ ]fatigue   Skin:  [ ] rash [ ] phlebitis	  Eyes: [ ] icterus [ ] pain  [ ] discharge	  ENMT: [ ] sore throat  [ ] thrush [ ] ulcers [ ] exudates [ ]anosmia  Respiratory: [ ] dyspnea [ ] hemoptysis [ ] cough [ ] sputum	  Cardiovascular:  [ ] chest pain [ ] palpitations [ ] edema	  Gastrointestinal:  [ ] nausea [ ] vomiting [ ] diarrhea [ ] constipation [X] pain 	  Genitourinary:  [ ] dysuria [ ] frequency [ ] hematuria [ ] discharge [ ] flank pain  [ ] incontinence  Musculoskeletal:  [ ] myalgias [ ] arthralgias [ ] arthritis  [ ] back pain  Neurological:  [ ] headache [ ] weakness [ ] seizures  [ ] confusion/altered mental status    prior hospital charts reviewed [V]  primary team notes reviewed [V]  other consultant notes reviewed [V]    PAST MEDICAL & SURGICAL HISTORY:  Tachycardia    Anemia    Pulmonary hypertension    Pulmonary embolism    Asthma  On home O2 nightly at 2L via NC    PE (pulmonary thromboembolism)  on Xarelto 10 mg daily    Sinusitis    Corneal disorder    Right heart failure    CAD (coronary artery disease)    H/O pulmonary hypertension    Pulmonary embolism    Asthma    History of tachycardia    On supplemental oxygen by nasal cannula  O2 @ 2L    Pacemaker    Skin mass  left upper thigh mass    History of tubal ligation    Pacemaker    H/O tubal ligation    History of pacemaker  12/19 - [employer] model Ingevity 4427    SOCIAL HISTORY:  - Denied smoking/vaping/alcohol/recreational drug use    FAMILY HISTORY:  Family history of diabetes mellitus  in brother    Family history of diabetes mellitus in mother    FH: anemia    Allergies  adhesives (Rash)  penicillin (Rash)  vancomycin (Rash)    ANTIMICROBIALS:  doxycycline IVPB    doxycycline IVPB 100 every 12 hours    ANTIMICROBIALS (past 90 days):  MEDICATIONS  (STANDING):  ceFAZolin   IVPB   100 mL/Hr IV Intermittent (01-03-23 @ 23:49)    doxycycline IVPB   100 mL/Hr IV Intermittent (01-03-23 @ 16:02)    doxycycline IVPB   100 mL/Hr IV Intermittent (01-04-23 @ 06:33)    OTHER MEDS:   MEDICATIONS  (STANDING):  acetaminophen     Tablet .. 650 every 6 hours PRN  aluminum hydroxide/magnesium hydroxide/simethicone Suspension 30 every 4 hours PRN  furosemide    Tablet 20 daily  ipratropium 17 MICROgram(s) HFA Inhaler 1 four times a day  melatonin 3 at bedtime PRN  ondansetron Injectable 4 every 8 hours PRN  pantoprazole    Tablet 40 before breakfast  predniSONE   Tablet 10 daily  rivaroxaban 20 with dinner  senna 2 at bedtime  sildenafil (REVATIO) 20 every 8 hours  spironolactone 25 daily    VITALS:  Vital Signs Last 24 Hrs  T(F): 98.2 (01-04-23 @ 10:41), Max: 99.1 (01-03-23 @ 23:25)    Vital Signs Last 24 Hrs  HR: 88 (01-04-23 @ 10:41) (88 - 99)  BP: 96/67 (01-04-23 @ 10:41) (96/67 - 123/81)  RR: 20 (01-04-23 @ 10:41)  SpO2: 97% (01-04-23 @ 10:41) (97% - 100%)  Wt(kg): --    EXAM:  Physical Exam:  Constitutional:  well preserved, comfortable  Head/Eyes: no icterus, PERRL, EOMI  ENT:  supple; no thrush  LUNGS:  CTA  CVS:  normal S1, S2, no murmur  Abd:  RLQ packed wound s/p I&D, LLQ medication pump with no surrounding erythema   Ext:  no edema  Vascular:  IV site no erythema tenderness or discharge  MSK:  joints without swelling  Neuro: AAO X 3, non- focal    Labs:                        11.4   15.02 )-----------( 313      ( 04 Jan 2023 07:34 )             37.5     01-04    135  |  102  |  8   ----------------------------<  78  4.2   |  20<L>  |  0.93    Ca    8.5      04 Jan 2023 07:34    TPro  6.5  /  Alb  3.4  /  TBili  0.8  /  DBili  x   /  AST  12  /  ALT  8<L>  /  AlkPhos  57  01-04    WBC Trend:  WBC Count: 15.02 (01-04-23 @ 07:34)  WBC Count: 14.69 (01-03-23 @ 16:27)    Auto Neutrophil #: 11.83 K/uL (01-03-23 @ 16:27)  Auto Neutrophil #: 9.48 K/uL (09-13-22 @ 07:40)  Auto Neutrophil #: 12.04 K/uL (09-09-22 @ 12:48)  Auto Neutrophil #: 9.22 K/uL (09-08-22 @ 04:26)  Auto Neutrophil #: 11.92 K/uL (09-07-22 @ 07:21)    Creatine Trend:  Creatinine, Serum: 0.93 (01-04)  Creatinine, Serum: 0.97 (01-03)    Liver Biochemical Testing Trend:  Alanine Aminotransferase (ALT/SGPT): 8 *L* (01-04)  Alanine Aminotransferase (ALT/SGPT): 8 *L* (01-03)  Alanine Aminotransferase (ALT/SGPT): 26 (09-12)  Alanine Aminotransferase (ALT/SGPT): 29 (09-11)  Alanine Aminotransferase (ALT/SGPT): 41 (09-10)  Aspartate Aminotransferase (AST/SGOT): 12 (01-04-23 @ 07:34)  Aspartate Aminotransferase (AST/SGOT): 10 (01-03-23 @ 16:27)  Aspartate Aminotransferase (AST/SGOT): 22 (09-12-22 @ 07:27)  Aspartate Aminotransferase (AST/SGOT): 21 (09-11-22 @ 10:56)  Aspartate Aminotransferase (AST/SGOT): 25 (09-10-22 @ 11:53)  Bilirubin Total, Serum: 0.8 (01-04)  Bilirubin Total, Serum: 0.9 (01-03)  Bilirubin Total, Serum: 0.6 (09-12)  Bilirubin Total, Serum: 0.5 (09-11)  Bilirubin Total, Serum: 0.6 (09-10)    Trend LDH    Auto Eosinophil %: 1.8 % (01-03-23 @ 16:27)    MICROBIOLOGY:    MRSA PCR Result.: NotDetec (01-03-23 @ 16:45)  MRSA PCR Result.: Detected (08-29-22 @ 19:22)    Culture - Abscess with Gram Stain (collected 08 Sep 2022 20:12)  Source: .Abscess Skin  Final Report:    Moderate Methicillin Resistant Staphylococcus aureus  Organism: Methicillin resistant Staphylococcus aureus  Organism: Methicillin resistant Staphylococcus aureus    Sensitivities:      -  Ampicillin/Sulbactam: R 16/8      -  Cefazolin: R >16      -  Clindamycin: R >4      -  Daptomycin: S 1      -  Erythromycin: R >4      -  Gentamicin: S <=1 Should not be used as monotherapy      -  Linezolid: S 2      -  Oxacillin: R >2      -  Penicillin: R >8      -  Rifampin: S <=1 Should not be used as monotherapy      -  Tetra/Doxy: S <=1      -  Trimethoprim/Sulfamethoxazole: S <=0.5/9.5      -  Vancomycin: S 2      Method Type: BRIAN    Culture - Abscess with Gram Stain (collected 08 Sep 2022 20:12)  Source: .Abscess Skin  Final Report:    Few Methicillin Resistant Staphylococcus aureus  Organism: Methicillin resistant Staphylococcus aureus  Organism: Methicillin resistant Staphylococcus aureus    Sensitivities:      -  Ampicillin/Sulbactam: R >16/8      -  Cefazolin: R >16      -  Clindamycin: R >4      -  Daptomycin: S 1      -  Erythromycin: R >4      -  Gentamicin: S <=1 Should not be used as monotherapy      -  Linezolid: S 2      -  Oxacillin: R >2      -  Penicillin: R >8      -  Rifampin: S <=1 Should not be used as monotherapy      -  Tetra/Doxy: S <=1      -  Trimethoprim/Sulfamethoxazole: S <=0.5/9.5      -  Vancomycin: S 2      Method Type: BRIAN    Culture - Blood (collected 07 Sep 2022 17:37)  Source: .Blood Blood-Venous  Final Report:    No Growth Final    Culture - Blood (collected 07 Sep 2022 17:25)  Source: .Blood Blood-Venous  Final Report:    No Growth Final    Culture - Sputum (collected 31 Aug 2022 20:45)  Source: .Sputum Sputum  Final Report:    Normal Respiratory Laura present    Culture - Blood (collected 31 Aug 2022 00:12)  Source: .Blood Blood-Peripheral  Final Report:    No Growth Final    Culture - Blood (collected 31 Aug 2022 00:12)  Source: .Blood Blood-Peripheral  Final Report:    No Growth Final    Culture - Blood (collected 29 Aug 2022 22:00)  Source: .Blood Blood-Peripheral  Final Report:    No Growth Final    Culture - Blood (collected 29 Aug 2022 19:00)  Source: .Blood Blood-Peripheral  Final Report:    Growth in aerobic and anaerobic bottles: Staphylococcus lugdunensis    ***Blood Panel PCR results on this specimen are available    approximately 3 hours after the Gram stain result.***    Gram stain, PCR, and/or culture results may not always    correspond due to difference in methodologies.    ************************************************************    This PCR assay was performed by multiplex PCR. This    Assay tests for 66 bacterial and resistance gene targets.    Please refer to the [facility] Labs test directory    at [email or web address] for details.  Organism: Blood Culture PCR  Staphylococcus lugdunensis  Organism: Staphylococcus lugdunensis    Sensitivities:      -  Ampicillin/Sulbactam: S <=8/4      -  Cefazolin: S <=4      -  Clindamycin: R <=0.25 This isolate is presumed to be clindamycin resistant based on detection of inducible resistance. Clindamycin may still be effective in some patients.      -  Erythromycin: R >4      -  Gentamicin: S <=1 Should not be used as monotherapy      -  Oxacillin: S 0.5      -  Penicillin: R 4      -  Rifampin: S <=1 Should not be used as monotherapy      -  Tetra/Doxy: S <=1      -  Trimethoprim/Sulfamethoxazole: S <=0.5/9.5      -  Vancomycin: S 0.5      Method Type: BRIAN  Organism: Blood Culture PCR    Sensitivities:      -  Staphylococcus lugdunensis: Detec      Method Type: PCR    RADIOLOGY:  imaging below personally reviewed    < from: CT Abdomen and Pelvis w/ IV Cont (01.03.23 @ 17:56) >  IMPRESSION:    1.  4.6 cm right lower quadrant anterior abdominal wall abscess with   surrounding inflammation and skin thickening.    2.  Left lower quadrant abdominal wall skin thickening with tiny   associated subcutaneous collection and overlying medical device appears   similar to prior.    --- End of Report ---      < end of copied text >           HPI:    42 year old Female with PMHx of pulmHTN on 2L O2, on Remodulin pump, ILD, PPM, RA thrombus on xarelto s/p thrombectomy, multiple skin abscess presented on 1/3 with abdominal wall swelling. Pt usually has remodulin pump to RLQ, noted last week to develop pain and swelling to that area and changed pump site to the LLQ. Since then started to develop worsening pain and swelling to the RLQ abdominal area, went to see her pulmonologist/PCP Dr. Yoon who recommend she come to the ED for evaluation of the abscess.   ID consulted for further recommendations.      REVIEW OF SYSTEMS  [  ] ROS unobtainable because:    [X] All other systems negative except as noted below    Constitutional:  [ ] fever [ ] chills  [ ] weight loss  [ ]night sweat  [ ]poor appetite/PO intake [ ]fatigue   Skin:  [ ] rash [ ] phlebitis	  Eyes: [ ] icterus [ ] pain  [ ] discharge	  ENMT: [ ] sore throat  [ ] thrush [ ] ulcers [ ] exudates [ ]anosmia  Respiratory: [ ] dyspnea [ ] hemoptysis [ ] cough [ ] sputum	  Cardiovascular:  [ ] chest pain [ ] palpitations [ ] edema	  Gastrointestinal:  [ ] nausea [ ] vomiting [ ] diarrhea [ ] constipation [X] pain 	  Genitourinary:  [ ] dysuria [ ] frequency [ ] hematuria [ ] discharge [ ] flank pain  [ ] incontinence  Musculoskeletal:  [ ] myalgias [ ] arthralgias [ ] arthritis  [ ] back pain  Neurological:  [ ] headache [ ] weakness [ ] seizures  [ ] confusion/altered mental status  psych: no anxiety  prior hospital charts reviewed [V]  primary team notes reviewed [V]  other consultant notes reviewed [V]    PAST MEDICAL & SURGICAL HISTORY:  Tachycardia    Anemia    Pulmonary hypertension    Pulmonary embolism    Asthma  On home O2 nightly at 2L via NC    PE (pulmonary thromboembolism)  on Xarelto 10 mg daily    Sinusitis    Corneal disorder    Right heart failure    CAD (coronary artery disease)    H/O pulmonary hypertension    Pulmonary embolism    Asthma    History of tachycardia    On supplemental oxygen by nasal cannula  O2 @ 2L    Pacemaker    Skin mass  left upper thigh mass    History of tubal ligation    Pacemaker    H/O tubal ligation    History of pacemaker  12/19 - [employer] model Ingevity 4401    SOCIAL HISTORY:  lives with family  no smoking/vaping/alcohol/recreational drug use  no recent travel    FAMILY HISTORY:  Family history of diabetes mellitus  in brother    Family history of diabetes mellitus in mother    FH: anemia    Allergies  adhesives (Rash)  penicillin (Rash)  vancomycin (Rash)    ANTIMICROBIALS:  doxycycline IVPB    doxycycline IVPB 100 every 12 hours    ANTIMICROBIALS (past 90 days):  MEDICATIONS  (STANDING):  ceFAZolin   IVPB   100 mL/Hr IV Intermittent (01-03-23 @ 23:49)    doxycycline IVPB   100 mL/Hr IV Intermittent (01-03-23 @ 16:02)    doxycycline IVPB   100 mL/Hr IV Intermittent (01-04-23 @ 06:33)    OTHER MEDS:   MEDICATIONS  (STANDING):  acetaminophen     Tablet .. 650 every 6 hours PRN  aluminum hydroxide/magnesium hydroxide/simethicone Suspension 30 every 4 hours PRN  furosemide    Tablet 20 daily  ipratropium 17 MICROgram(s) HFA Inhaler 1 four times a day  melatonin 3 at bedtime PRN  ondansetron Injectable 4 every 8 hours PRN  pantoprazole    Tablet 40 before breakfast  predniSONE   Tablet 10 daily  rivaroxaban 20 with dinner  senna 2 at bedtime  sildenafil (REVATIO) 20 every 8 hours  spironolactone 25 daily    VITALS:  Vital Signs Last 24 Hrs  T(F): 98.2 (01-04-23 @ 10:41), Max: 99.1 (01-03-23 @ 23:25)    Vital Signs Last 24 Hrs  HR: 88 (01-04-23 @ 10:41) (88 - 99)  BP: 96/67 (01-04-23 @ 10:41) (96/67 - 123/81)  RR: 20 (01-04-23 @ 10:41)  SpO2: 97% (01-04-23 @ 10:41) (97% - 100%)  Wt(kg): --    EXAM:  Physical Exam:  Constitutional:  well preserved, comfortable  Head: atraumatic, normocephalic  Eyes: no icterus, PERRL, EOMI  ENT:  supple; no thrush  LUNGS:  CTA  CVS:  normal S1, S2, no murmur  Abd:  RLQ packed wound s/p I&D, LLQ medication pump with no surrounding erythema   : no suprapubic or CVA tenderness  MSK: no joint swelling  Vascular:  IV site no erythema tenderness or discharge  ext: no edema  Neuro: AAO X 3, non- focal  psych: normal affect  Labs:                        11.4   15.02 )-----------( 313      ( 04 Jan 2023 07:34 )             37.5     01-04    135  |  102  |  8   ----------------------------<  78  4.2   |  20<L>  |  0.93    Ca    8.5      04 Jan 2023 07:34    TPro  6.5  /  Alb  3.4  /  TBili  0.8  /  DBili  x   /  AST  12  /  ALT  8<L>  /  AlkPhos  57  01-04    WBC Trend:  WBC Count: 15.02 (01-04-23 @ 07:34)  WBC Count: 14.69 (01-03-23 @ 16:27)    Auto Neutrophil #: 11.83 K/uL (01-03-23 @ 16:27)  Auto Neutrophil #: 9.48 K/uL (09-13-22 @ 07:40)  Auto Neutrophil #: 12.04 K/uL (09-09-22 @ 12:48)  Auto Neutrophil #: 9.22 K/uL (09-08-22 @ 04:26)  Auto Neutrophil #: 11.92 K/uL (09-07-22 @ 07:21)    Creatine Trend:  Creatinine, Serum: 0.93 (01-04)  Creatinine, Serum: 0.97 (01-03)    Liver Biochemical Testing Trend:  Alanine Aminotransferase (ALT/SGPT): 8 *L* (01-04)  Alanine Aminotransferase (ALT/SGPT): 8 *L* (01-03)  Alanine Aminotransferase (ALT/SGPT): 26 (09-12)  Alanine Aminotransferase (ALT/SGPT): 29 (09-11)  Alanine Aminotransferase (ALT/SGPT): 41 (09-10)  Aspartate Aminotransferase (AST/SGOT): 12 (01-04-23 @ 07:34)  Aspartate Aminotransferase (AST/SGOT): 10 (01-03-23 @ 16:27)  Aspartate Aminotransferase (AST/SGOT): 22 (09-12-22 @ 07:27)  Aspartate Aminotransferase (AST/SGOT): 21 (09-11-22 @ 10:56)  Aspartate Aminotransferase (AST/SGOT): 25 (09-10-22 @ 11:53)  Bilirubin Total, Serum: 0.8 (01-04)  Bilirubin Total, Serum: 0.9 (01-03)  Bilirubin Total, Serum: 0.6 (09-12)  Bilirubin Total, Serum: 0.5 (09-11)  Bilirubin Total, Serum: 0.6 (09-10)    Trend LDH    Auto Eosinophil %: 1.8 % (01-03-23 @ 16:27)    MICROBIOLOGY:    MRSA PCR Result.: NotDetec (01-03-23 @ 16:45)  MRSA PCR Result.: Detected (08-29-22 @ 19:22)    Culture - Abscess with Gram Stain (collected 08 Sep 2022 20:12)  Source: .Abscess Skin  Final Report:    Moderate Methicillin Resistant Staphylococcus aureus  Organism: Methicillin resistant Staphylococcus aureus  Organism: Methicillin resistant Staphylococcus aureus    Sensitivities:      -  Ampicillin/Sulbactam: R 16/8      -  Cefazolin: R >16      -  Clindamycin: R >4      -  Daptomycin: S 1      -  Erythromycin: R >4      -  Gentamicin: S <=1 Should not be used as monotherapy      -  Linezolid: S 2      -  Oxacillin: R >2      -  Penicillin: R >8      -  Rifampin: S <=1 Should not be used as monotherapy      -  Tetra/Doxy: S <=1      -  Trimethoprim/Sulfamethoxazole: S <=0.5/9.5      -  Vancomycin: S 2      Method Type: BRIAN    Culture - Abscess with Gram Stain (collected 08 Sep 2022 20:12)  Source: .Abscess Skin  Final Report:    Few Methicillin Resistant Staphylococcus aureus  Organism: Methicillin resistant Staphylococcus aureus  Organism: Methicillin resistant Staphylococcus aureus    Sensitivities:      -  Ampicillin/Sulbactam: R >16/8      -  Cefazolin: R >16      -  Clindamycin: R >4      -  Daptomycin: S 1      -  Erythromycin: R >4      -  Gentamicin: S <=1 Should not be used as monotherapy      -  Linezolid: S 2      -  Oxacillin: R >2      -  Penicillin: R >8      -  Rifampin: S <=1 Should not be used as monotherapy      -  Tetra/Doxy: S <=1      -  Trimethoprim/Sulfamethoxazole: S <=0.5/9.5      -  Vancomycin: S 2      Method Type: BRIAN    Culture - Blood (collected 07 Sep 2022 17:37)  Source: .Blood Blood-Venous  Final Report:    No Growth Final    Culture - Blood (collected 07 Sep 2022 17:25)  Source: .Blood Blood-Venous  Final Report:    No Growth Final    Culture - Sputum (collected 31 Aug 2022 20:45)  Source: .Sputum Sputum  Final Report:    Normal Respiratory Laura present    Culture - Blood (collected 31 Aug 2022 00:12)  Source: .Blood Blood-Peripheral  Final Report:    No Growth Final    Culture - Blood (collected 31 Aug 2022 00:12)  Source: .Blood Blood-Peripheral  Final Report:    No Growth Final    Culture - Blood (collected 29 Aug 2022 22:00)  Source: .Blood Blood-Peripheral  Final Report:    No Growth Final    Culture - Blood (collected 29 Aug 2022 19:00)  Source: .Blood Blood-Peripheral  Final Report:    Growth in aerobic and anaerobic bottles: Staphylococcus lugdunensis    ***Blood Panel PCR results on this specimen are available    approximately 3 hours after the Gram stain result.***    Gram stain, PCR, and/or culture results may not always    correspond due to difference in methodologies.    ************************************************************    This PCR assay was performed by multiplex PCR. This    Assay tests for 66 bacterial and resistance gene targets.    Please refer to the [facility] test directory    at [email or web address] for details.  Organism: Blood Culture PCR  Staphylococcus lugdunensis  Organism: Staphylococcus lugdunensis    Sensitivities:      -  Ampicillin/Sulbactam: S <=8/4      -  Cefazolin: S <=4      -  Clindamycin: R <=0.25 This isolate is presumed to be clindamycin resistant based on detection of inducible resistance. Clindamycin may still be effective in some patients.      -  Erythromycin: R >4      -  Gentamicin: S <=1 Should not be used as monotherapy      -  Oxacillin: S 0.5      -  Penicillin: R 4      -  Rifampin: S <=1 Should not be used as monotherapy      -  Tetra/Doxy: S <=1      -  Trimethoprim/Sulfamethoxazole: S <=0.5/9.5      -  Vancomycin: S 0.5      Method Type: BRIAN  Organism: Blood Culture PCR    Sensitivities:      -  Staphylococcus lugdunensis: Detec      Method Type: PCR    RADIOLOGY:  imaging below personally reviewed    < from: CT Abdomen and Pelvis w/ IV Cont (01.03.23 @ 17:56) >  IMPRESSION:    1.  4.6 cm right lower quadrant anterior abdominal wall abscess with   surrounding inflammation and skin thickening.    2.  Left lower quadrant abdominal wall skin thickening with tiny   associated subcutaneous collection and overlying medical device appears   similar to prior.    --- End of Report ---      < end of copied text >

## 2023-01-04 NOTE — PROVIDER CONTACT NOTE (OTHER) - BACKGROUND
Dx: RLQ Abdominal Abscess. Pt has history of allergy to vanco. As per hand off from day RN, plan was to trial vanco over a slow infusion but if develops reaction to stop and give IVP benadryl.

## 2023-01-04 NOTE — PROCEDURE NOTE - NSANESTHESIA_GEN_A_CORE
1% lidocaine Calcipotriene Counseling:  I discussed with the patient the risks of calcipotriene including but not limited to erythema, scaling, itching, and irritation.

## 2023-01-04 NOTE — PROGRESS NOTE ADULT - ASSESSMENT
42F PMHx of pulmonary HTN (group I and group IV; on Treprostinil and Xarelto), home o2 (2L), ILD, JULIA, obesity, hx of abdominal abscesses (related to treprostinil pump), RV failure, RA thrombus s/p thrombectomy, and PPM here for right wall abdominal abscess. Patient sent in by Dr. Yoon. Pulmonary consulted for management of Remodulin pump. Patient with pain around her abdominal wound. Denies dyspnea, orthopnea, worsening LE edema or increase in her o2 requirements.     Recommendations  -S/p I&D on 1/3. Monitor for fever with associated desaturations. None noted.  -CW Sildenafil 20 mg po tid.   -CW Remodulin SQ pump at home dose (44 ng/kg/min).   -CW chronic prednisone 10 mg po daily.   -CW home diuretics and inhalers.   -Aim for euvolemia.   -Pulmonary will follow.     Patient should f/u with Dr. Yoon. Please email [email or web address] and include patient's name,  and MRN and allow 24 hours for scheduling.    [address] [place] [address]  [phone]    Dr. Ramesh Echevarria, DO  Pulmonary and Critical Care Medicine Fellow   Available via Microsoft Teams - **Preferred**  Pulmonary Spectra #[id number] ([facility]) / [id number] ([facility])  Pager:  [phone]   42F PMHx of pulmonary HTN (group I and group IV; on Treprostinil and Xarelto), home o2 (2L), ILD, JULIA, obesity, hx of abdominal abscesses (related to treprostinil pump), RV failure, RA thrombus s/p thrombectomy, and PPM here for right wall abdominal abscess. Patient sent in by Dr. Yoon. Pulmonary consulted for management of Remodulin pump. Patient with pain around her abdominal wound. Denies dyspnea, orthopnea, worsening LE edema or increase in her o2 requirements.     Recommendations  -S/p I&D on 1/3. Cultures pending. On Doxy.  -Monitor for fever with associated desaturations and/or signs of RV failure. None noted.  -C/w home 2L NC.   -CW Sildenafil 20 mg po tid.   -CW Remodulin SQ pump at home dose (44 ng/kg/min).   -CW chronic prednisone 10 mg po daily.   -CW home diuretics and inhalers.   -Aim for euvolemia.   -Pulmonary will follow.     Patient should f/u with Dr. Yoon. Please email [email or web address] and include patient's name,  and MRN and allow 24 hours for scheduling.    [address] [place] [address]  [phone]    Dr. Ramesh Echevarria,   Pulmonary and Critical Care Medicine Fellow   Available via Microsoft Teams - **Preferred**  Pulmonary Spectra #[id number] ([facility]) / [id number] ([facility])  Pager:  [phone]   42F PMHx of pulmonary HTN (group I and group IV; on Treprostinil and Xarelto), home o2 (2L), ILD, JULIA, obesity, hx of abdominal abscesses (related to treprostinil pump), RV failure, RA thrombus s/p thrombectomy, and PPM here for right wall abdominal abscess. Patient sent in by Dr. Yoon. Pulmonary consulted for management of Remodulin pump. Patient with pain around her abdominal wound. Denies dyspnea, orthopnea, worsening LE edema or increase in her o2 requirements.     Recommendations  -S/p I&D on 1/3. Cultures pending. On Doxy currently.  -Our team consulted ID. Appreciate recs.  -Monitor for fever with associated desaturations and/or signs of RV failure. None noted.  -C/w home 2L NC.   -CW Sildenafil 20 mg po tid.   -CW Remodulin SQ pump at home dose (44 ng/kg/min).   -CW chronic prednisone 10 mg po daily.   -CW home diuretics and inhalers.   -Aim for euvolemia.   -Pulmonary will follow.     Patient should f/u with Dr. Yoon. Please email [email or web address] and include patient's name,  and MRN and allow 24 hours for scheduling.    [address] [place] [address]  [phone]    Dr. Ramesh Echevarria,   Pulmonary and Critical Care Medicine Fellow   Available via Microsoft Teams - **Preferred**  Pulmonary Spectra #[id number] ([facility]) / [phone] ([facility])  Pager:  [phone]

## 2023-01-04 NOTE — PROGRESS NOTE ADULT - ASSESSMENT
42F hx pHTN on 2L O2 and Remodulin SQ pump, PPM, RA thrombus on Xarelto s/p thrombectomy, multiple skin abscess formations p/w abdominal wall abscess, s/p I&D in ED.

## 2023-01-04 NOTE — PROGRESS NOTE ADULT - ASSESSMENT
42 year old female with right abdominal wall abscess. Now s/p bedside I&D of RLQ abdominal wall abscess.     Plan:  - Daily dressing changes   - Please arrange for VNS services for dressing changes outpatient   - F/u wound culture  - Abx per primary team     Dressing instructions: change daily or more frequently as needed if saturated   - Pack wound with 1 inch packing   - Cover with 4x4 and tap     Discussed with surgery attending on call, Dr. Stevens.      Red Surgery  [id number]   42 year old female with right abdominal wall abscess. Now s/p bedside I&D of RLQ abdominal wall abscess.     Plan:  - Daily dressing changes   - Please arrange for VNS services for dressing changes outpatient   - F/u wound culture  - Abx per primary team     Dressing instructions: change daily or more frequently as needed if saturated   - Pack wound with 1 inch packing   - Cover with 4x4 and tape    Discussed with surgery attending on call, Dr. Stevens.      Red Surgery  [id number]

## 2023-01-04 NOTE — ED ADULT NURSE REASSESSMENT NOTE - NS ED NURSE REASSESS COMMENT FT1
pt educated on hospital policy regarding self administration of own medication supply and pump. attestation form signed and placed in chart.

## 2023-01-04 NOTE — PROGRESS NOTE ADULT - ATTENDING COMMENTS
DATE OF SERVICE: 01-04-23 @ 13:34    Seen and examined, pain greatly improved  WBC 15 today   Wound clean, serosanguinous drainage    VNS for wound care  ABx per primary  dressing change in AM
pt seen and examined    d/w the team  also d/w the ID attending    Pt with PAH on Remodulin [ S/q 44nngm/kg/min ] and sildenafil -pt is also on anticoagulation  will cond the same dose of her PAH meds  has developed  abscess on the abdomen --that has been drained --f/u the cultures  antibiotics as per ID   contd anticoagulation    keep sat >90%    WILL F/U THE PT CLINICALLY

## 2023-01-05 ENCOUNTER — TRANSCRIPTION ENCOUNTER (OUTPATIENT)
Age: 43
End: 2023-01-05

## 2023-01-05 VITALS
SYSTOLIC BLOOD PRESSURE: 106 MMHG | TEMPERATURE: 98 F | HEART RATE: 105 BPM | OXYGEN SATURATION: 93 % | RESPIRATION RATE: 18 BRPM | DIASTOLIC BLOOD PRESSURE: 80 MMHG

## 2023-01-05 PROCEDURE — 87205 SMEAR GRAM STAIN: CPT

## 2023-01-05 PROCEDURE — 99239 HOSP IP/OBS DSCHRG MGMT >30: CPT

## 2023-01-05 PROCEDURE — 85610 PROTHROMBIN TIME: CPT

## 2023-01-05 PROCEDURE — 96374 THER/PROPH/DIAG INJ IV PUSH: CPT

## 2023-01-05 PROCEDURE — 87640 STAPH A DNA AMP PROBE: CPT

## 2023-01-05 PROCEDURE — 99285 EMERGENCY DEPT VISIT HI MDM: CPT

## 2023-01-05 PROCEDURE — 87186 SC STD MICRODIL/AGAR DIL: CPT

## 2023-01-05 PROCEDURE — 94640 AIRWAY INHALATION TREATMENT: CPT

## 2023-01-05 PROCEDURE — 87070 CULTURE OTHR SPECIMN AEROBIC: CPT

## 2023-01-05 PROCEDURE — 85027 COMPLETE CBC AUTOMATED: CPT

## 2023-01-05 PROCEDURE — 84702 CHORIONIC GONADOTROPIN TEST: CPT

## 2023-01-05 PROCEDURE — 80053 COMPREHEN METABOLIC PANEL: CPT

## 2023-01-05 PROCEDURE — 74177 CT ABD & PELVIS W/CONTRAST: CPT | Mod: MA

## 2023-01-05 PROCEDURE — 87040 BLOOD CULTURE FOR BACTERIA: CPT

## 2023-01-05 PROCEDURE — 85730 THROMBOPLASTIN TIME PARTIAL: CPT

## 2023-01-05 PROCEDURE — 87641 MR-STAPH DNA AMP PROBE: CPT

## 2023-01-05 PROCEDURE — 87075 CULTR BACTERIA EXCEPT BLOOD: CPT

## 2023-01-05 PROCEDURE — 99232 SBSQ HOSP IP/OBS MODERATE 35: CPT

## 2023-01-05 PROCEDURE — 36415 COLL VENOUS BLD VENIPUNCTURE: CPT

## 2023-01-05 PROCEDURE — 87637 SARSCOV2&INF A&B&RSV AMP PRB: CPT

## 2023-01-05 PROCEDURE — G0378: CPT

## 2023-01-05 PROCEDURE — 85025 COMPLETE CBC W/AUTO DIFF WBC: CPT

## 2023-01-05 RX ORDER — TREPROSTINIL 48 UG/1
5 INHALANT ORAL
Qty: 1 | Refills: 0
Start: 2023-01-05

## 2023-01-05 RX ADMIN — Medication 20 MILLIGRAM(S): at 14:04

## 2023-01-05 RX ADMIN — CHLORHEXIDINE GLUCONATE 1 APPLICATION(S): 213 SOLUTION TOPICAL at 06:31

## 2023-01-05 RX ADMIN — Medication 1 PUFF(S): at 11:03

## 2023-01-05 RX ADMIN — Medication 1 PUFF(S): at 00:00

## 2023-01-05 RX ADMIN — Medication 10 MILLIGRAM(S): at 06:15

## 2023-01-05 RX ADMIN — Medication 20 MILLIGRAM(S): at 06:15

## 2023-01-05 RX ADMIN — Medication 1 PUFF(S): at 06:15

## 2023-01-05 RX ADMIN — Medication 20 MILLIGRAM(S): at 02:52

## 2023-01-05 RX ADMIN — SPIRONOLACTONE 25 MILLIGRAM(S): 25 TABLET, FILM COATED ORAL at 06:15

## 2023-01-05 RX ADMIN — Medication 100 MILLIGRAM(S): at 06:14

## 2023-01-05 RX ADMIN — PANTOPRAZOLE SODIUM 40 MILLIGRAM(S): 20 TABLET, DELAYED RELEASE ORAL at 06:15

## 2023-01-05 NOTE — PROGRESS NOTE ADULT - ASSESSMENT
42 year old female with right abdominal wall abscess. Now s/p bedside I&D of RLQ abdominal wall abscess.     Plan:  - Daily dressing changes per nursing   - Please arrange for VNS services for dressing changes outpatient   - F/u wound culture  - Abx per primary team   - Please recall surgery as needed     Dressing instructions: change daily or more frequently as needed if saturated   - Pack wound with 1 inch packing   - Cover with 4x4 and tape    Discussed with surgery attending on call, Dr. Stevens.      Red Surgery  [id number]   What Type Of Note Output Would You Prefer (Optional)?: Bullet Format Hpi Title: Evaluation of Skin Lesions How Severe Are Your Spot(S)?: mild Have Your Spot(S) Been Treated In The Past?: has not been treated

## 2023-01-05 NOTE — CHART NOTE - NSCHARTNOTEFT_GEN_A_CORE
Notified by RN that patient c/o severe itch after starting Vancomycin  Patient being treated Recurrent skin abscesses with MRSA   ? Allergy to Vancomycin -?Red man syndrome   As  per ID reintroduce Vancomycin 1 g IV q 12hrs (slow infusion)  -If develops allergic reaction, can do doxycycline Notified by RN that patient c/o severe itch after starting Vancomycin  Patient being treated  for recurrent skin abscesses with MRSA   ? Allergy to Vancomycin -?Red man syndrome   As  per ID reintroduce Vancomycin 1 g IV q 12hrs (slow infusion)  -If develops allergic reaction, can do doxycycline  C/o severe itch, no rash or redness noted, denies respiratory distress  Benadryl given   Vanco d/cd and placed on Doxycycline 100 mg IV Q 121 hrs  F/u with ID in am    Veronica Ochoa ANP-C  Department of Medicine  [id number]

## 2023-01-05 NOTE — DISCHARGE NOTE PROVIDER - NSDCHHASSISTILLNESS_GEN_ALL_CORE
Addended by: MINA LOPEZ on: 12/13/2022 12:22 PM     Modules accepted: Orders, SmartSet    
oxygen and pump

## 2023-01-05 NOTE — DISCHARGE NOTE NURSING/CASE MANAGEMENT/SOCIAL WORK - PATIENT PORTAL LINK FT
You can access the FollowMyHealth Patient Portal offered by [facility] by registering at the following website: [email or web address]. By joining [employer]’s FollowMyHealth portal, you will also be able to view your health information using other applications (apps) compatible with our system.

## 2023-01-05 NOTE — PROGRESS NOTE ADULT - REASON FOR ADMISSION
pain at injection site

## 2023-01-05 NOTE — PROGRESS NOTE ADULT - SUBJECTIVE AND OBJECTIVE BOX
Follow Up:  abd wall abscess    Interval History/ROS: pt is doing well,  no fever, SOB, abd pain, vomiting, diarrhea or dysuria, wants to go home          Allergies  adhesives (Rash)  penicillin (Rash)  vancomycin (Rash)        ANTIMICROBIALS:  doxycycline IVPB 100 every 12 hours      OTHER MEDS:  acetaminophen     Tablet .. 650 milliGRAM(s) Oral every 6 hours PRN  aluminum hydroxide/magnesium hydroxide/simethicone Suspension 30 milliLiter(s) Oral every 4 hours PRN  chlorhexidine 2% Cloths 1 Application(s) Topical <User Schedule>  furosemide    Tablet 20 milliGRAM(s) Oral daily  ipratropium 17 MICROgram(s) HFA Inhaler 1 Puff(s) Inhalation four times a day  lidocaine 1% Injectable 40 milliLiter(s) Local Injection once  melatonin 3 milliGRAM(s) Oral at bedtime PRN  ondansetron Injectable 4 milliGRAM(s) IV Push every 8 hours PRN  pantoprazole    Tablet 40 milliGRAM(s) Oral before breakfast  predniSONE   Tablet 10 milliGRAM(s) Oral daily  Remodulin (Treprostinil) 5mg/mL 1 Vial(s) 1 Vial(s) SubCutaneous Continuous Pump  rivaroxaban 20 milliGRAM(s) Oral with dinner  senna 2 Tablet(s) Oral at bedtime  sildenafil (REVATIO) 20 milliGRAM(s) Oral every 8 hours  sodium chloride 0.9% Bolus 500 milliLiter(s) IV Bolus once  spironolactone 25 milliGRAM(s) Oral daily      Vital Signs Last 24 Hrs  T(C): 36.7 (05 Jan 2023 12:06), Max: 36.9 (04 Jan 2023 19:04)  T(F): 98.1 (05 Jan 2023 12:06), Max: 98.5 (04 Jan 2023 19:04)  HR: 105 (05 Jan 2023 12:06) (75 - 112)  BP: 106/80 (05 Jan 2023 12:06) (87/71 - 122/88)  BP(mean): --  RR: 18 (05 Jan 2023 12:06) (18 - 20)  SpO2: 93% (05 Jan 2023 12:06) (92% - 99%)    Parameters below as of 05 Jan 2023 12:06  Patient On (Oxygen Delivery Method): room air        Physical Exam:  General:    NAD,  non toxic  Respiratory:   comfortable on RA  abd:     soft,   BS +,   no tenderness  :   no CVAT,  no suprapubic tenderness,   no  lucas  Musculoskeletal:   no joint swelling  vascular: no phlebitis  Skin:    no rash, R abd wall abscess s/p I&D with packing                          11.4   15.02 )-----------( 313      ( 04 Jan 2023 07:34 )             37.5       01-04    135  |  102  |  8   ----------------------------<  78  4.2   |  20<L>  |  0.93    Ca    8.5      04 Jan 2023 07:34    TPro  6.5  /  Alb  3.4  /  TBili  0.8  /  DBili  x   /  AST  12  /  ALT  8<L>  /  AlkPhos  57  01-04          MICROBIOLOGY:  v  .Abscess abdominal  01-04-23   Numerous Staphylococcus aureus  --  --      .Blood Blood-Peripheral  01-03-23   No growth to date.  --  --      .Blood Blood-Peripheral  01-03-23   No growth to date.  --  --                RADIOLOGY:  Images independently visualized and reviewed personally, findings as below  < from: CT Abdomen and Pelvis w/ IV Cont (01.03.23 @ 17:56) >  IMPRESSION:    1.  4.6 cm right lower quadrant anterior abdominal wall abscess with   surrounding inflammation and skin thickening.    2.  Left lower quadrant abdominal wall skin thickening with tiny   associated subcutaneous collection and overlying medical device appears   similar to prior.      < end of copied text >  
Surgery Progress Note      SUBJECTIVE: Patient seen and examined at bedside with surgical team. No acute events overnight.     OBJECTIVE:    Vital Signs Last 24 Hrs  T(C): 36.6 (05 Jan 2023 04:32), Max: 36.9 (04 Jan 2023 19:04)  T(F): 97.9 (05 Jan 2023 04:32), Max: 98.5 (04 Jan 2023 19:04)  HR: 75 (05 Jan 2023 06:21) (75 - 112)  BP: 122/88 (05 Jan 2023 06:21) (87/71 - 122/88)  BP(mean): --  RR: 20 (05 Jan 2023 04:32) (20 - 20)  SpO2: 92% (05 Jan 2023 04:32) (92% - 99%)    Parameters below as of 05 Jan 2023 04:32  Patient On (Oxygen Delivery Method): room air    I&O's Detail  MEDICATIONS  (STANDING):  chlorhexidine 2% Cloths 1 Application(s) Topical <User Schedule>  doxycycline IVPB 100 milliGRAM(s) IV Intermittent every 12 hours  furosemide    Tablet 20 milliGRAM(s) Oral daily  ipratropium 17 MICROgram(s) HFA Inhaler 1 Puff(s) Inhalation four times a day  lidocaine 1% Injectable 40 milliLiter(s) Local Injection once  pantoprazole    Tablet 40 milliGRAM(s) Oral before breakfast  predniSONE   Tablet 10 milliGRAM(s) Oral daily  Remodulin (Treprostinil) 5mg/mL 1 Vial(s) 1 Vial(s) SubCutaneous Continuous Pump  rivaroxaban 20 milliGRAM(s) Oral with dinner  senna 2 Tablet(s) Oral at bedtime  sildenafil (REVATIO) 20 milliGRAM(s) Oral every 8 hours  sodium chloride 0.9% Bolus 500 milliLiter(s) IV Bolus once  spironolactone 25 milliGRAM(s) Oral daily    MEDICATIONS  (PRN):  acetaminophen     Tablet .. 650 milliGRAM(s) Oral every 6 hours PRN Temp greater or equal to 38C (100.4F), Mild Pain (1 - 3)  aluminum hydroxide/magnesium hydroxide/simethicone Suspension 30 milliLiter(s) Oral every 4 hours PRN Dyspepsia  melatonin 3 milliGRAM(s) Oral at bedtime PRN Insomnia  ondansetron Injectable 4 milliGRAM(s) IV Push every 8 hours PRN Nausea and/or Vomiting      PHYSICAL EXAM:  Constitutional:  NAD  Respiratory: Unlabored breathing  Abdomen: Soft, nondistended, NTTP. No rebound or guarding. Incision site C/D/I  Extremities: WWP, MARTE spontaneously    LABS:                        11.4   15.02 )-----------( 313      ( 04 Jan 2023 07:34 )             37.5     01-04    135  |  102  |  8   ----------------------------<  78  4.2   |  20<L>  |  0.93    Ca    8.5      04 Jan 2023 07:34    TPro  6.5  /  Alb  3.4  /  TBili  0.8  /  DBili  x   /  AST  12  /  ALT  8<L>  /  AlkPhos  57  01-04    PT/INR - ( 04 Jan 2023 07:34 )   PT: 14.2 sec;   INR: 1.23 ratio         PTT - ( 03 Jan 2023 16:27 )  PTT:42.8 sec  LIVER FUNCTIONS - ( 04 Jan 2023 07:34 )  Alb: 3.4 g/dL / Pro: 6.5 g/dL / ALK PHOS: 57 U/L / ALT: 8 U/L / AST: 12 U/L / GGT: x                 
PULMONARY SERVICE FOLLOW UP CONSULT NOTE    SUBJECTIVE:  S/p abdominal wall I&D.   Denies chest pain, dyspnea, cough, or wheezing.  No acute complaints.     REVIEW OF SYSTEMS:  All additional ROS negative.    MEDICATIONS:  Pulmonary:  ipratropium 17 MICROgram(s) HFA Inhaler 1 Puff(s) Inhalation four times a day    Antimicrobials:  doxycycline IVPB      doxycycline IVPB 100 milliGRAM(s) IV Intermittent every 12 hours    Anticoagulants:  rivaroxaban 20 milliGRAM(s) Oral with dinner    Onc:    GI/:  aluminum hydroxide/magnesium hydroxide/simethicone Suspension 30 milliLiter(s) Oral every 4 hours PRN  pantoprazole    Tablet 40 milliGRAM(s) Oral before breakfast  senna 2 Tablet(s) Oral at bedtime    Endocrine:  predniSONE   Tablet 10 milliGRAM(s) Oral daily    Cardiac:  furosemide    Tablet 20 milliGRAM(s) Oral daily  sildenafil (REVATIO) 20 milliGRAM(s) Oral every 8 hours  spironolactone 25 milliGRAM(s) Oral daily    Other Medications:  acetaminophen     Tablet .. 650 milliGRAM(s) Oral every 6 hours PRN  chlorhexidine 2% Cloths 1 Application(s) Topical <User Schedule>  lidocaine 1% Injectable 40 milliLiter(s) Local Injection once  melatonin 3 milliGRAM(s) Oral at bedtime PRN  ondansetron Injectable 4 milliGRAM(s) IV Push every 8 hours PRN  Treprostinil (REMODULIN) 1 Unit(s) 1 Unit(s) IV Continuous <Continuous>      PHYSICAL EXAM  Vital Signs Last 24 Hrs  T(C): 36.8 (04 Jan 2023 10:41), Max: 37.3 (03 Jan 2023 23:25)  T(F): 98.2 (04 Jan 2023 10:41), Max: 99.1 (03 Jan 2023 23:25)  HR: 88 (04 Jan 2023 10:41) (88 - 99)  BP: 96/67 (04 Jan 2023 10:41) (96/67 - 123/81)  BP(mean): 90 (03 Jan 2023 23:25) (90 - 90)  RR: 20 (04 Jan 2023 10:41) (16 - 20)  SpO2: 97% (04 Jan 2023 10:41) (97% - 100%)    Parameters below as of 04 Jan 2023 10:41  Patient On (Oxygen Delivery Method): nasal cannula  O2 Flow (L/min): 2          CONSTITUTIONAL: No acute distress.   HEENT:  Conjunctiva clear B/L.  Moist oral mucosa.   Cardiovascular: RRR with no murmurs. No JVD noted. No lower extremity edema B/L. Extremities are warm and well perfused.    Respiratory: Lungs CTAB. No wrr. No accessory muscle use.   Gastrointestinal:  Soft, nontender. Non-distended. Non-rigid.    Neurologic:  Alert and awake. Moving all extremities. Following commands.    Skin: abdominal wall dressing noted. Deferred undressing.       LABS:      CBC Full  -  ( 04 Jan 2023 07:34 )  WBC Count : 15.02 K/uL  RBC Count : 5.49 M/uL  Hemoglobin : 11.4 g/dL  Hematocrit : 37.5 %  Platelet Count - Automated : 313 K/uL  Mean Cell Volume : 68.3 fl  Mean Cell Hemoglobin : 20.8 pg  Mean Cell Hemoglobin Concentration : 30.4 gm/dL  Auto Neutrophil # : x  Auto Lymphocyte # : x  Auto Monocyte # : x  Auto Eosinophil # : x  Auto Basophil # : x  Auto Neutrophil % : x  Auto Lymphocyte % : x  Auto Monocyte % : x  Auto Eosinophil % : x  Auto Basophil % : x    01-04    135  |  102  |  8   ----------------------------<  78  4.2   |  20<L>  |  0.93    Ca    8.5      04 Jan 2023 07:34    TPro  6.5  /  Alb  3.4  /  TBili  0.8  /  DBili  x   /  AST  12  /  ALT  8<L>  /  AlkPhos  57  01-04    PT/INR - ( 04 Jan 2023 07:34 )   PT: 14.2 sec;   INR: 1.23 ratio         PTT - ( 03 Jan 2023 16:27 )  PTT:42.8 sec    
Surgery Progress Note      SUBJECTIVE: Patient seen and examined at bedside with surgical team. No acute events overnight.     OBJECTIVE:    Vital Signs Last 24 Hrs  T(C): 36.8 (04 Jan 2023 06:13), Max: 37.3 (03 Jan 2023 23:25)  T(F): 98.2 (04 Jan 2023 06:13), Max: 99.1 (03 Jan 2023 23:25)  HR: 92 (04 Jan 2023 06:13) (92 - 99)  BP: 107/59 (04 Jan 2023 06:13) (97/72 - 123/81)  BP(mean): 90 (03 Jan 2023 23:25) (90 - 90)  RR: 17 (04 Jan 2023 06:13) (16 - 17)  SpO2: 99% (04 Jan 2023 06:13) (97% - 100%)    Parameters below as of 04 Jan 2023 06:13  Patient On (Oxygen Delivery Method): nasal cannula  O2 Flow (L/min): 2  I&O's Detail  MEDICATIONS  (STANDING):  doxycycline IVPB      doxycycline IVPB 100 milliGRAM(s) IV Intermittent every 12 hours  furosemide    Tablet 20 milliGRAM(s) Oral daily  ipratropium 17 MICROgram(s) HFA Inhaler 1 Puff(s) Inhalation four times a day  lidocaine 1% Injectable 40 milliLiter(s) Local Injection once  pantoprazole    Tablet 40 milliGRAM(s) Oral before breakfast  predniSONE   Tablet 10 milliGRAM(s) Oral daily  rivaroxaban 20 milliGRAM(s) Oral with dinner  senna 2 Tablet(s) Oral at bedtime  sildenafil (REVATIO) 20 milliGRAM(s) Oral every 8 hours  spironolactone 25 milliGRAM(s) Oral daily  Treprostinil (REMODULIN) 1 Unit(s) 1 Unit(s) (0.05 mL/Hr) IV Continuous <Continuous>    MEDICATIONS  (PRN):  acetaminophen     Tablet .. 650 milliGRAM(s) Oral every 6 hours PRN Temp greater or equal to 38C (100.4F), Mild Pain (1 - 3)  aluminum hydroxide/magnesium hydroxide/simethicone Suspension 30 milliLiter(s) Oral every 4 hours PRN Dyspepsia  melatonin 3 milliGRAM(s) Oral at bedtime PRN Insomnia  ondansetron Injectable 4 milliGRAM(s) IV Push every 8 hours PRN Nausea and/or Vomiting      PHYSICAL EXAM:  Constitutional:  NAD  Respiratory: Unlabored breathing  Abdomen: Soft, nondistended, NTTP. No rebound or guarding. RLQ incision site C/D/I and hemostatic. Packing and dressing changed at bedside.   Extremities: WWP, MARTE spontaneously    LABS:                        13.1   14.69 )-----------( 357      ( 03 Jan 2023 16:27 )             42.8     01-03    136  |  101  |  8   ----------------------------<  76  3.4<L>   |  23  |  0.97    Ca    8.5      03 Jan 2023 16:27    TPro  7.5  /  Alb  4.3  /  TBili  0.9  /  DBili  x   /  AST  10  /  ALT  8<L>  /  AlkPhos  64  01-03    PT/INR - ( 03 Jan 2023 16:27 )   PT: 17.3 sec;   INR: 1.49 ratio         PTT - ( 03 Jan 2023 16:27 )  PTT:42.8 sec  LIVER FUNCTIONS - ( 03 Jan 2023 16:27 )  Alb: 4.3 g/dL / Pro: 7.5 g/dL / ALK PHOS: 64 U/L / ALT: 8 U/L / AST: 10 U/L / GGT: x                 
Tomas Lundberg MD    Patient is a 42y old  Female who presents with a chief complaint of pain at injection site (05 Jan 2023 13:03)        SUBJECTIVE / OVERNIGHT EVENTS: Patient unable to tolerate vancomycin. On Doxycycline. Anxious to go home      MEDICATIONS  (STANDING):  chlorhexidine 2% Cloths 1 Application(s) Topical <User Schedule>  doxycycline IVPB 100 milliGRAM(s) IV Intermittent every 12 hours  furosemide    Tablet 20 milliGRAM(s) Oral daily  ipratropium 17 MICROgram(s) HFA Inhaler 1 Puff(s) Inhalation four times a day  lidocaine 1% Injectable 40 milliLiter(s) Local Injection once  pantoprazole    Tablet 40 milliGRAM(s) Oral before breakfast  predniSONE   Tablet 10 milliGRAM(s) Oral daily  Remodulin (Treprostinil) 5mg/mL 1 Vial(s) 1 Vial(s) SubCutaneous Continuous Pump  rivaroxaban 20 milliGRAM(s) Oral with dinner  senna 2 Tablet(s) Oral at bedtime  sildenafil (REVATIO) 20 milliGRAM(s) Oral every 8 hours  sodium chloride 0.9% Bolus 500 milliLiter(s) IV Bolus once  spironolactone 25 milliGRAM(s) Oral daily    MEDICATIONS  (PRN):  acetaminophen     Tablet .. 650 milliGRAM(s) Oral every 6 hours PRN Temp greater or equal to 38C (100.4F), Mild Pain (1 - 3)  aluminum hydroxide/magnesium hydroxide/simethicone Suspension 30 milliLiter(s) Oral every 4 hours PRN Dyspepsia  melatonin 3 milliGRAM(s) Oral at bedtime PRN Insomnia  ondansetron Injectable 4 milliGRAM(s) IV Push every 8 hours PRN Nausea and/or Vomiting      Vital Signs Last 24 Hrs  T(C): 36.7 (05 Jan 2023 12:06), Max: 36.9 (04 Jan 2023 19:04)  T(F): 98.1 (05 Jan 2023 12:06), Max: 98.5 (04 Jan 2023 19:04)  HR: 105 (05 Jan 2023 12:06) (75 - 112)  BP: 106/80 (05 Jan 2023 12:06) (87/71 - 122/88)  BP(mean): --  RR: 18 (05 Jan 2023 12:06) (18 - 20)  SpO2: 93% (05 Jan 2023 12:06) (92% - 99%)    Parameters below as of 05 Jan 2023 12:06  Patient On (Oxygen Delivery Method): room air      CAPILLARY BLOOD GLUCOSE        I&O's Summary        PHYSICAL EXAM  GENERAL: NAD, well-developed  HEAD:  Atraumatic, normocephalic  EYES: EOMI, PERRLA, conjunctiva and sclera clear  CHEST/LUNG: Clear to auscultation bilaterally; no wheezes  HEART: +S1+S2, regular rate and rhythm  ABDOMEN: Soft, unable to visually inspect abdomen as patient remains in hallway of ED  EXTREMITIES:  2+ peripheral pulses; no clubbing, cyanosis, or edema  PSYCH: AAOx3      LABS:                        11.4   15.02 )-----------( 313      ( 04 Jan 2023 07:34 )             37.5     01-04    135  |  102  |  8   ----------------------------<  78  4.2   |  20<L>  |  0.93    Ca    8.5      04 Jan 2023 07:34    TPro  6.5  /  Alb  3.4  /  TBili  0.8  /  DBili  x   /  AST  12  /  ALT  8<L>  /  AlkPhos  57  01-04    PT/INR - ( 04 Jan 2023 07:34 )   PT: 14.2 sec;   INR: 1.23 ratio         PTT - ( 03 Jan 2023 16:27 )  PTT:42.8 sec          Culture - Abscess with Gram Stain (collected 04 Jan 2023 00:33)  Source: .Abscess abdominal  Preliminary Report (05 Jan 2023 10:02):    Numerous Staphylococcus aureus    Culture - Blood (collected 03 Jan 2023 15:38)  Source: .Blood Blood-Peripheral  Preliminary Report (05 Jan 2023 04:01):    No growth to date.    Culture - Blood (collected 03 Jan 2023 15:26)  Source: .Blood Blood-Peripheral  Preliminary Report (05 Jan 2023 02:02):    No growth to date.        RADIOLOGY & ADDITIONAL TESTS:    Imaging Personally Reviewed:  Consultant(s) Notes Reviewed:    Care Discussed with Consultants/Other Providers:  
Tomas Lundberg MD    Patient is a 42y old  Female who presents with a chief complaint of pain at injection site (04 Jan 2023 13:33)        SUBJECTIVE / OVERNIGHT EVENTS: Patient denies pain. SOB at baseline      MEDICATIONS  (STANDING):  chlorhexidine 2% Cloths 1 Application(s) Topical <User Schedule>  doxycycline IVPB      doxycycline IVPB 100 milliGRAM(s) IV Intermittent every 12 hours  furosemide    Tablet 20 milliGRAM(s) Oral daily  ipratropium 17 MICROgram(s) HFA Inhaler 1 Puff(s) Inhalation four times a day  lidocaine 1% Injectable 40 milliLiter(s) Local Injection once  pantoprazole    Tablet 40 milliGRAM(s) Oral before breakfast  predniSONE   Tablet 10 milliGRAM(s) Oral daily  rivaroxaban 20 milliGRAM(s) Oral with dinner  senna 2 Tablet(s) Oral at bedtime  sildenafil (REVATIO) 20 milliGRAM(s) Oral every 8 hours  spironolactone 25 milliGRAM(s) Oral daily  Treprostinil (REMODULIN) 1 Unit(s) 1 Unit(s) (0.05 mL/Hr) IV Continuous <Continuous>    MEDICATIONS  (PRN):  acetaminophen     Tablet .. 650 milliGRAM(s) Oral every 6 hours PRN Temp greater or equal to 38C (100.4F), Mild Pain (1 - 3)  aluminum hydroxide/magnesium hydroxide/simethicone Suspension 30 milliLiter(s) Oral every 4 hours PRN Dyspepsia  melatonin 3 milliGRAM(s) Oral at bedtime PRN Insomnia  ondansetron Injectable 4 milliGRAM(s) IV Push every 8 hours PRN Nausea and/or Vomiting      Vital Signs Last 24 Hrs  T(C): 36.8 (04 Jan 2023 10:41), Max: 37.3 (03 Jan 2023 23:25)  T(F): 98.2 (04 Jan 2023 10:41), Max: 99.1 (03 Jan 2023 23:25)  HR: 88 (04 Jan 2023 10:41) (88 - 99)  BP: 96/67 (04 Jan 2023 10:41) (96/67 - 118/75)  BP(mean): 90 (03 Jan 2023 23:25) (90 - 90)  RR: 20 (04 Jan 2023 10:41) (16 - 20)  SpO2: 97% (04 Jan 2023 10:41) (97% - 100%)    Parameters below as of 04 Jan 2023 10:41  Patient On (Oxygen Delivery Method): nasal cannula  O2 Flow (L/min): 2    CAPILLARY BLOOD GLUCOSE        I&O's Summary        PHYSICAL EXAM  GENERAL: NAD, well-developed  HEAD:  Atraumatic, normocephalic  EYES: EOMI, PERRLA, conjunctiva and sclera clear  CHEST/LUNG: Faint exp wheezes b/l  HEART: +S1+S2, regular rate and rhythm  ABDOMEN: Soft, unable to visually inspect abdomen as patient in hallway of ED  EXTREMITIES:  2+ peripheral pulses; no clubbing, cyanosis, or edema  PSYCH: AAOx3      LABS:                        11.4   15.02 )-----------( 313      ( 04 Jan 2023 07:34 )             37.5     01-04    135  |  102  |  8   ----------------------------<  78  4.2   |  20<L>  |  0.93    Ca    8.5      04 Jan 2023 07:34    TPro  6.5  /  Alb  3.4  /  TBili  0.8  /  DBili  x   /  AST  12  /  ALT  8<L>  /  AlkPhos  57  01-04    PT/INR - ( 04 Jan 2023 07:34 )   PT: 14.2 sec;   INR: 1.23 ratio         PTT - ( 03 Jan 2023 16:27 )  PTT:42.8 sec            RADIOLOGY & ADDITIONAL TESTS:    Imaging Personally Reviewed:  Consultant(s) Notes Reviewed:    Care Discussed with Consultants/Other Providers:

## 2023-01-05 NOTE — PROGRESS NOTE ADULT - PROBLEM SELECTOR PLAN 1
S/p bedside I&D by surgery  F/u cultures  Continue doxycycline 100 mg IV q12   ID consult underway
S/p bedside I&D by surgery  F/u cultures  Continue doxycycline- change to 100 mg PO BID for 7 days per ID recommendations  Visiting nurse for wound care at discharge

## 2023-01-05 NOTE — DISCHARGE NOTE PROVIDER - HOSPITAL COURSE
42F hx pHTN on 2L O2 and Remodulin SQ pump, PPM, RA thrombus on Xarelto s/p thrombectomy, multiple skin abscess formations p/w abdominal wall abscess, s/p I&D in ED.     Problem/Plan - 1:  ·  Problem: Right lower quadrant abdominal abscess.   ·  Plan: S/p bedside I&D by surgery  F/u cultures  Continue doxycycline- change to 100 mg PO BID for 7 days per ID recommendations  sx consulted  Dressing instructions: change daily or more frequently as needed if saturated   - Pack wound with 1 inch packing   - Cover with 4x4 and tap  Visiting nurse for wound care at discharge.     Problem/Plan - 2:  ·  Problem: Pulmonary hypertension.   ·  Plan: Continue Sildenafil, home Remodulin SQ pump  Continue prednisone 10  Continue Lasix, Atrovent.     Problem/Plan - 3:  ·  Problem: Right atrial thrombus.   ·  Plan: Continue Xarelto.     Problem/Plan - 4:  ·  Problem: Discharge planning issues.   ·  Plan: D/C home today- 45 minutes spent discharging the patient  Outpatient surgery and pulmonary follow up.

## 2023-01-05 NOTE — PROGRESS NOTE ADULT - PROBLEM SELECTOR PLAN 2
Continue Sildenafil, home Remodulin SQ pump  Continue prednisone 10  Continue Lasix, Atrovent
Continue Sildenafil, home Remodulin SQ pump  Continue prednisone 10  Continue Lasix, Atrovent  Pulm evaluating- patient of Dr. Yoon

## 2023-01-05 NOTE — DISCHARGE NOTE PROVIDER - NSDCCPCAREPLAN_GEN_ALL_CORE_FT
PRINCIPAL DISCHARGE DIAGNOSIS  Diagnosis: Abdominal wall abscess  Assessment and Plan of Treatment: Dressing instructions: change daily or more frequently as needed if saturated   - Pack wound with 1 inch packing   - Cover with 4x4 and tape  follow up with pcp  and surgery within 1 week  return if worsens      SECONDARY DISCHARGE DIAGNOSES  Diagnosis: Right atrial thrombus  Assessment and Plan of Treatment: RA thrombus on Xarelto    Diagnosis: Pulmonary hypertension  Assessment and Plan of Treatment: pulmonary HTN on 2L O2 and Remodulin SQ pump, PPM

## 2023-01-05 NOTE — DISCHARGE NOTE PROVIDER - NSDCFUSCHEDAPPT_GEN_ALL_CORE_FT
Elpidio Lou  [facility]  PULMMED [address]  Scheduled Appointment: 01/18/2023    [facility]  ELECTROPH [phone]  Scheduled Appointment: 03/17/2023    Paramjit Chavis  [facility]  HEARTFAIL [address]  Scheduled Appointment: 03/29/2023

## 2023-01-05 NOTE — DISCHARGE NOTE PROVIDER - NSDCMRMEDTOKEN_GEN_ALL_CORE_FT
no
Atrovent 500 mcg/2.5 mL inhalation solution: 2.5 milliliter(s) inhaled every 6 hours, As Needed  Atrovent HFA 17 mcg/inh inhalation aerosol: puff(s) inhaled every 6 hours, As Needed  doxycycline hyclate 100 mg oral tablet: 1 tab(s) orally 2 times a day   furosemide 20 mg oral tablet: 1 tab(s) orally every other day   omeprazole 40 mg oral delayed release capsule: 1 cap(s) orally once a day  predniSONE 10 mg oral tablet: 1 tab(s) orally once a day  Remodulin 5 mg/mL injectable solution: patient is taking 44ng/kg/min via her own SQ PUMP  Remodulin 5 mg/mL injectable solution: 5 milligram(s) injectable once continous pump  rivaroxaban 20 mg oral tablet: 1 tab(s) orally once a day (before a meal)  sildenafil 20 mg oral tablet: 1 tab(s) orally every 8 hours   spironolactone 25 mg oral tablet: 1 tab(s) orally once a day  
no

## 2023-01-05 NOTE — DISCHARGE NOTE PROVIDER - CARE PROVIDER_API CALL
Elpidio Lou)  Critical Care Medicine; Pulmonary Disease  [address] [place] [address]  Phone: [phone]  Fax: [phone]  Established Patient  Follow Up Time: 1-3 days   Elpidio Lou)  Critical Care Medicine; Pulmonary Disease  [address] [place] [address]  Phone: [phone]  Fax: [phone]  Established Patient  Follow Up Time: 1-3 days    Vivian Yoon)  Critical Care Medicine; Internal Medicine; Pulmonary Disease; Sleep Medicine  [address] [facility], [address]  [place] [address]  Phone: [phone]  Fax: [phone]  Established Patient  Follow Up Time: 1-3 days    Paramjit Chavis)  Adv Heart Fail Trnsplnt Cardio; Cardiovascular Disease; Internal Medicine  [address] [place] [address]  Phone: [phone]  Fax: [phone]  Established Patient  Follow Up Time:

## 2023-01-05 NOTE — PROGRESS NOTE ADULT - ASSESSMENT
42 m with pulmHTN on 2L O2, on Remodulin pump, ILD, PPM, RA thrombus on xarelto s/p thrombectomy, multiple skin abscess, recent admission in august/September 2022 for abdominal skin abscess with MRSA and bacteremia with Staph Lugdunensis treated with doxy for 10 days and 4 weeks of Cefazolin, TTE did not show vegetation but MYRIAM was not done, presented on 1/3 with abdominal wall swelling.  Afebrile  Leukocytosis 14K --> 15K  CT AP on 1/3: 4.6 cm right lower quadrant anterior abdominal wall abscess, Left lower quadrant abdominal wall skin thickening with tiny associated subcutaneous collection and overlying medical device  S/p I&D by surgery on 1/4 with bloody and purulent drainage     recurrent abd wall abscess previously with MRSA and had h/o staph lugdunencis bacteremia s/p 4 weeks of cefazolin, has a PPM as well (no MYRIAM was done last time)  pt had itching due to vanco, ?real reaction vs sai so tried another dose of vanco but she had itching and ?rash  s/p I&D bu surgery 1/4 and cx with staph aureus  * f/u the abscess cx sensitivites  * c/w doxy 100 bid  * pt wants to go home, so will c/w doxy for 7 days and follow the culture  * f/u with surgery    The above assessment and plan was discussed with the primary team    Harper Becerra MD  contact on teams  After 5pm and on weekends call [phone]

## 2023-01-05 NOTE — PROGRESS NOTE ADULT - PROBLEM SELECTOR PLAN 4
D/C home today- 45 minutes spent discharging the patient  Outpatient surgery and pulmonary follow up.

## 2023-01-05 NOTE — DISCHARGE NOTE NURSING/CASE MANAGEMENT/SOCIAL WORK - NSDCPEFALRISK_GEN_ALL_CORE
For information on Fall & Injury Prevention, visit: [email or web address] OR  [email or web address] OR  https://www.cdc.gov/steadi/patient.html

## 2023-01-05 NOTE — DISCHARGE NOTE PROVIDER - PROVIDER TOKENS
PROVIDER:[TOKEN:[[id number]:[facility]:[id number]],FOLLOWUP:[1-3 days],ESTABLISHEDPATIENT:[T]] PROVIDER:[TOKEN:[[id number]:[facility]:[id number]],FOLLOWUP:[1-3 days],ESTABLISHEDPATIENT:[T]],PROVIDER:[TOKEN:[[id number]],FOLLOWUP:[1-3 days],ESTABLISHEDPATIENT:[T]],PROVIDER:[TOKEN:[[id number]],ESTABLISHEDPATIENT:[T]]

## 2023-01-05 NOTE — DISCHARGE NOTE PROVIDER - CARE PROVIDERS DIRECT ADDRESSES
,DirectAddress_Unknown ,DirectAddress_Unknown,[email or web address]

## 2023-01-06 ENCOUNTER — NON-APPOINTMENT (OUTPATIENT)
Age: 43
End: 2023-01-06

## 2023-01-06 LAB
-  AMPICILLIN/SULBACTAM: SIGNIFICANT CHANGE UP
-  CEFAZOLIN: SIGNIFICANT CHANGE UP
-  CLINDAMYCIN: SIGNIFICANT CHANGE UP
-  DAPTOMYCIN: SIGNIFICANT CHANGE UP
-  ERYTHROMYCIN: SIGNIFICANT CHANGE UP
-  GENTAMICIN: SIGNIFICANT CHANGE UP
-  LINEZOLID: SIGNIFICANT CHANGE UP
-  OXACILLIN: SIGNIFICANT CHANGE UP
-  PENICILLIN: SIGNIFICANT CHANGE UP
-  RIFAMPIN: SIGNIFICANT CHANGE UP
-  TETRACYCLINE: SIGNIFICANT CHANGE UP
-  TRIMETHOPRIM/SULFAMETHOXAZOLE: SIGNIFICANT CHANGE UP
-  VANCOMYCIN: SIGNIFICANT CHANGE UP
METHOD TYPE: SIGNIFICANT CHANGE UP

## 2023-01-09 ENCOUNTER — NON-APPOINTMENT (OUTPATIENT)
Age: 43
End: 2023-01-09

## 2023-01-09 LAB
CULTURE RESULTS: SIGNIFICANT CHANGE UP
ORGANISM # SPEC MICROSCOPIC CNT: SIGNIFICANT CHANGE UP
ORGANISM # SPEC MICROSCOPIC CNT: SIGNIFICANT CHANGE UP
SPECIMEN SOURCE: SIGNIFICANT CHANGE UP

## 2023-01-10 ENCOUNTER — NON-APPOINTMENT (OUTPATIENT)
Age: 43
End: 2023-01-10

## 2023-01-10 ENCOUNTER — APPOINTMENT (OUTPATIENT)
Dept: PULMONOLOGY | Facility: CLINIC | Age: 43
End: 2023-01-10
Payer: MEDICAID

## 2023-01-10 VITALS
RESPIRATION RATE: 15 BRPM | HEART RATE: 107 BPM | WEIGHT: 220 LBS | DIASTOLIC BLOOD PRESSURE: 85 MMHG | OXYGEN SATURATION: 98 % | SYSTOLIC BLOOD PRESSURE: 136 MMHG | TEMPERATURE: 97.7 F | HEIGHT: 65 IN | BODY MASS INDEX: 36.65 KG/M2

## 2023-01-10 LAB
ALBUMIN SERPL ELPH-MCNC: 4 G/DL
ALP BLD-CCNC: 58 U/L
ALT SERPL-CCNC: 11 U/L
ANION GAP SERPL CALC-SCNC: 12 MMOL/L
AST SERPL-CCNC: 26 U/L
BILIRUB SERPL-MCNC: 0.7 MG/DL
BUN SERPL-MCNC: 11 MG/DL
CALCIUM SERPL-MCNC: 8.7 MG/DL
CHLORIDE SERPL-SCNC: 104 MMOL/L
CO2 SERPL-SCNC: 20 MMOL/L
CREAT SERPL-MCNC: 0.92 MG/DL
EGFR: 80 ML/MIN/1.73M2
GLUCOSE SERPL-MCNC: 93 MG/DL
NT-PROBNP SERPL-MCNC: 284 PG/ML
POTASSIUM SERPL-SCNC: 4.9 MMOL/L
PROT SERPL-MCNC: 6.7 G/DL
RAPID RVP RESULT: NOT DETECTED
SARS-COV-2 RNA PNL RESP NAA+PROBE: NOT DETECTED
SODIUM SERPL-SCNC: 136 MMOL/L

## 2023-01-10 PROCEDURE — 99215 OFFICE O/P EST HI 40 MIN: CPT

## 2023-01-10 RX ORDER — PREDNISONE 10 MG/1
10 TABLET ORAL
Qty: 90 | Refills: 1 | Status: DISCONTINUED | COMMUNITY
Start: 2021-11-08 | End: 2023-01-10

## 2023-01-10 NOTE — PHYSICAL EXAM
[No Acute Distress] : no acute distress [Normal Oropharynx] : normal oropharynx [No Neck Mass] : no neck mass [Clear to Auscultation Bilaterally] : clear to auscultation bilaterally [No Abnormalities] : no abnormalities [Benign] : benign [Normal Gait] : normal gait [No Clubbing] : no clubbing [Normal Color/ Pigmentation] : normal color/ pigmentation [No Focal Deficits] : no focal deficits [Oriented x3] : oriented x3 [TextBox_54] : loud p2

## 2023-01-10 NOTE — DISCUSSION/SUMMARY
[FreeTextEntry1] : ----Assessment and Plan--------42 year-old lady with PULMONARY ARTERIAL HYPERTENSION S/P PPM FOR TACHYCARDIA AT [place] ON REMODULIN - S/P LEFT BREAST BIOPSY \par  doing well on remodulin 38 ng ----- ON XARELTO, LASIX AND ALDACTONE -------LOW DOSE PREDNISONE -----is following up with at [facility] for transplant listing-----final decision after the  and psych evaluation\par \par JAN 2023---- ABDOMINAL WALL ABSCESS-----needs surgical drainage-doneE\par \par \par 1. PULMONARY ART HTN   ---- WHO GROUP I FUNCTIONAL CLASS III ----\par  [AS PER DR FREEMAN WHO DID PULM ANGIO SHE HAS FEATURES OF GROUP I WITH SOME   EVIDENCE OF DISTAL CLOTS] \par  She is on Remodulin 44 ng/kg/min  -INCREASE TO 45 NG/KG/MIN  \par  Continue  Sildenafil 20mg qd, Xarelto, ON Lasix 20 mg 5 TIMES A WEEK AND ALDACTONE 25 MG \par \par 2. Asthma -. use  ipratropium and levalbuterol and budesonide. Refuses use of DUPIXENT at this time as she reports her allergies are "not that bad". Has has been steroid dependent likely contributing to her weight. She is hesitant to start biologic injections. \par Has discontinued Flonase and Dymysta nasal spray because of reports epistaxis \par Patient educated about persistent epistaxis to notify team and we will refer to ENT\par \par 3. High BMI (41.9 kg/m2)\par   Precluding lung transplant\par   Referred to nutritionist\par   Weight loss program advised\par \par 4. Oxygen medically necessary given severe Pulm Htn, anemia and h/o nocturnal desaturation which could worsen Pulm HTN. Advised oxygen 2 lpm x 24hrs\par \par 5. She is on Xarelto---will be on lifelong anticoagulation---\par \par 6) She has features of ERICA- including sleep disturbance, nocturnal desat, and excessive fatigue- HST was negative for ERICA in 2014 but had desaturation. Will try to repeat HST at next visit\par \par 8. Follows /[place] [facility]\par   ---Also consulted with Dr Lou at [facility]---\par   As per patient, Dr Lou feels she is not a candidate at this time because of good health\par   Although Dr. Lou advised needs to lose wt to be considered for transplant listing in future\par \par 9. ABDOMINAL WALL ABSCESS---HEALED  \par \par 9. NASAL CONGESTION---WILL SEND NASAL RESP SWAB --\par 10 NAUSEA---ZOFRAN \par \par F/U: in6 WEEKS\par \par \par Vivian Yoon MD, FCCP \par Director, Pulmonary Hypertension Program \par [facility] \par Division of Pulmonary, Critical Care and Sleep Medicine \par  Professor of Medicine \par [employer]\par \par \par PEARL HarrellC\par

## 2023-01-10 NOTE — HISTORY OF PRESENT ILLNESS
[Never] : never [TextBox_4] : ------Patient is here for follow up of her pulmonary htn. ------------------INITIALLY  REFD  WITH   ECHO [DONE  OUTSIDE ] ELEVATED PASP  DILATED RV AND RA---------  HAS BEEN TOLD IN THE PAST SHE HAS  ASTHMA-----long-standing history of dyspnea on exertion-----------------has baseline tachycardia ----.....No history of fever, chills , rigors, chest pain, or hemoptysis. No h/o significant weight loss in last few months. No history of liver dysfunction , collagen vascular disorder or chronic thromboembolic disease. I would classify her dyspnea as WHO  FUNCTIONAL CLASS III-----------no calf tenderness----------SLEEP STUDY SHOWS AHI 0.6 but T 90  < 35 %-----DIAGNOSED  AS PAH ---------WAS ON ADMAPAS  NOW ON   SQ REMODULIN  [ SINCE MAY 2108]  ---S/P PACEMAKER PLACEMENT AT [place]  AND ON METOPROLOL-----\par \par \par 2/3/2022 tested positive on home covid test 2/2/2022- developed shortness of breath, nausea and vomitting- son is positive\par She is vaccinated for covid but not boosted\par Afebrile, drinking but not eating well. O2 sat 94% room air rest- 92% room air w/walk\par \par 2/7/2022 covid pcr negative - did not get MAB\par still with SOUZA - on prednisone with taper\par \par 4/2022 6mwt o2 sat 95% to 94% on oxygen 69meters (135m) stopped d/t severe SOB and elevated HR\par \par 4/15/2022  pulm htn - on remodulin 36 ng (having jaw pain sometimes), sildenafil 10 mg qd, furosemide 20 mg 5x weekly which helped w/edema. She is also having palpitations- has not yet followed up with DR Chavis in cardiology.\par Asthma-occas wheezing- awaiting start of dupixent\par Up to date w/covid vac- declines influenza vac. s/p consult with lung transplant team but needs to lower BMI before being considered- pt has been referred to nutritionist but has not scheduled appt\par \par \par 5/31/22- pulm htn - on remodulin 36 ng SQ (having jaw pain sometimes), sildenafil 10 mg qd, and diuretics daily- doing well from pulm perspectives\par Asthma currently under control- declining biologic therapy\par Was following lung  transplant team but currently on hold d/t BMI\par \par \par 7/11/22- pulm htn--on remodulin 36 ng SQ (having jaw pain sometimes), sildenafil 10 mg qd and diuretics daily [LASIX WO MG---XARELTO \par was in the hospital at [facility]-- given Lasix --- we will slowly increase her dose of Remodulin\par \par 7/22/2022 pulm htn- breathing improved on higher dose of remodulin 40ng, having some diarrhea. Palpitations improved\par \par 9/15 /2022 ----s/p hospitalization for cardiogenic shock due to RV failure with markers of end organ dysfunction,\par started on  as well as Marcela. CTA negative for PE but TTE revealed RA thrombus\par with concern for clot in transit. She underwent catheter directed partial\par thrombus aspiration 8/31, course complicated by L fem pseudoaneurysm for which\par she received thrombin injection on 9/3. Course further complicated by S.\par Lugdunensis bacteremia on BCx from 8/29 s/p drainage of abscess at prior----on IV antibiotics as per ID\par Remodulin subcutaneous access site.--42 ng/kg/min , also on sildenafil 20 mg 3 times daily\par ----\par \par \par SEPT 27 2022-----feels much better on subcu Remodulin 44 ng/kg/min and sildenafil 20 mg 3 times daily on diuretics-on anticoagulation-------- needs to see vascular for left femoral pseudoaneurysm--- H/O---S.Lugdunensis bacteremia on BCx from 8/29 s/p drainage of abscess at prior----on IV antibiotics as per ID --------ALSO HAS Lleft   fem pseudoaneurysm---needs follow up by  vasular ---- --received flu vaccine today------------------------\par \par October 2022: Telehealth Visit via teledoc\par S/P hospitalization for cardiogenic shock c/b bacteremia on IV antibiotics. Followed by ID (Dr. Grover) for labs, antibiotics now d/c'd (10/6)  getting follow up labs w/ID (results pending from 10/19)\par Pulm htn -She reports use of Remodulin 44 ng/kg/min, tolerating new dose well  and Sildenafil (20 mg t.i.d PO) No resp complaints, no palpitations, follows cardiology Dr Paramjit Chavis. On diuretics, xarelto, O2 as needed\par s/p lung transplant eval- was told she was "too well" to be listed and needs to work on getting her BMI down\par \par jan 2022--developed abdominal wall abscess--needs surgical drainage--------subcu Remodulin 44 ng/kg/min and sildenafil 20 mg 3 times daily on diuretics-on anticoagulation--\par \par mario    10 2023--- recent admission to the hospital for abscess on abdominal wall which was drained and received antibiotics and it has healed----------------subcu Remodulin 44 ng/kg/min and sildenafil 20 mg 3 times daily on diuretics-on anticoagulation--I would like to increase Remodulin to 45 ng/kg/min--------- having some URI symptoms we will send a nasal swab continue diuretics---- [TextBox_100] : 1/2014 [TextBox_108] : 0.9 [TextBox_112] : 35.3

## 2023-01-12 ENCOUNTER — NON-APPOINTMENT (OUTPATIENT)
Age: 43
End: 2023-01-12

## 2023-01-17 NOTE — PHYSICAL THERAPY INITIAL EVALUATION ADULT - PERTINENT HX OF CURRENT PROBLEM, REHAB EVAL
Pt is a 36 y/o female admitted to [facility] on 5/1/18  PMHx of pulm htn w/ features of Class I/III, GERD, asthma on home O2 2L at bedtime, chronic PE on xarelto, iron deficiency anemia sent in by pulmonologist Dr. Vivian Yoon to be admitted for further workup for worsening dyspnea on exertion.  Pt states that 3 days ago, she was taking a shower, sat on the bed when she was done, felt like she was going to faint, had palpitations, and passed out - this is the third time she has lost consciousness, Birth Control Pills Counseling: Birth Control Pill Counseling: I discussed with the patient the potential side effects of OCPs including but not limited to increased risk of stroke, heart attack, thrombophlebitis, deep venous thrombosis, hepatic adenomas, breast changes, GI upset, headaches, and depression.  The patient verbalized understanding of the proper use and possible adverse effects of OCPs. All of the patient's questions and concerns were addressed.

## 2023-01-18 ENCOUNTER — APPOINTMENT (OUTPATIENT)
Dept: PULMONOLOGY | Facility: CLINIC | Age: 43
End: 2023-01-18

## 2023-01-18 NOTE — HISTORY OF PRESENT ILLNESS
[TextBox_4] : Ms. ROXI MARIA is a 42 year [id number] evaluated on Oct 14 2022 1:30PM, \par pmhx: bradycardia (s/p pacemaker), asthma, dyspnea on exertion, pulmonary htn, \par \par Initially evaluated for lung transplant 10/21- was advised to loose weight, with goal of 170-180 lbs, pulm rehab referral, recommended sleep study. She was recently admittecd to the [facility] for cardiogenic shock due to RV failure with markers of end organ dysfunction. Started on  as well as Marcela. CTA negative for PE but TTE revealed RA thrombus. She underwent catheter directed partial thrombus aspiration 8/31, course complicated by L fem pseudoaneurysm. She received thrombin injection on 9/3. Course further complicated by S. Lugdunensis bacteremia on BCx from 8/29 s/p drainage of abscess and IV antibiotics as per ID. Doing well since starting Remodulin 44 ng/kg/min and sildenafil 20 mg 3 times daily on diuretics. She is on anticoagulation. \par \par PMH:\par \par When/how diagnosed with primary pulm dx?\par \par \par PSH:\par \par \par Meds:\par \par \par Allergies:\par \par \par Oxygen requirement *** at rest *** on exertion *** at night\par \par Quality of life:\par \par Functional status:\par [] performs activities of daily living with NO assistance\par [] performs activities of daily living with SOME assistance\par [] performs activities of daily living with TOTAL assistance\par \par Exposures:\par \par Prior hospitalizations, ICU admission or intubations:\par \par Hx covid infection, blood transfusions or pregnancies:\par \par Health maintenance/vaccines:\par COVID vax: \par \par Family History:\par \par Social History:\par Lives with:\par ETOH/tobacco use:\par \par DATA REVIEWED:\par \par PFTS \par \par FVC:\par \par FEV1:\par \par TLC:\par \par DLCO:\par \par \par \par 6MWT: 66 meters\par \par \par CT CHEST \par \par \par ECHO\par \par \par RHC/LHC\par \par \par \par

## 2023-01-20 ENCOUNTER — APPOINTMENT (OUTPATIENT)
Dept: CV DIAGNOSITCS | Facility: HOSPITAL | Age: 43
End: 2023-01-20

## 2023-01-20 ENCOUNTER — OUTPATIENT (OUTPATIENT)
Dept: OUTPATIENT SERVICES | Facility: HOSPITAL | Age: 43
LOS: 1 days | End: 2023-01-20
Payer: MEDICAID

## 2023-01-20 DIAGNOSIS — Z98.51 TUBAL LIGATION STATUS: Chronic | ICD-10-CM

## 2023-01-20 DIAGNOSIS — Z95.0 PRESENCE OF CARDIAC PACEMAKER: Chronic | ICD-10-CM

## 2023-01-20 DIAGNOSIS — I27.20 PULMONARY HYPERTENSION, UNSPECIFIED: ICD-10-CM

## 2023-01-20 PROCEDURE — 93306 TTE W/DOPPLER COMPLETE: CPT | Mod: 26

## 2023-02-15 NOTE — ED PROVIDER NOTE - PMH
Anemia  On home O2 at 2L via NC  Asthma    Asthma with status asthmaticus, unspecified asthma severity    PE (pulmonary thromboembolism)  on coumadin  Pulmonary embolism    Pulmonary hypertension    Pulmonary hypertension    Sinus tachycardia    Tachycardia
(2) Patient Placed in Bed

## 2023-02-27 ENCOUNTER — NON-APPOINTMENT (OUTPATIENT)
Age: 43
End: 2023-02-27

## 2023-03-01 ENCOUNTER — APPOINTMENT (OUTPATIENT)
Dept: PULMONOLOGY | Facility: CLINIC | Age: 43
End: 2023-03-01

## 2023-03-06 ENCOUNTER — NON-APPOINTMENT (OUTPATIENT)
Age: 43
End: 2023-03-06

## 2023-03-06 ENCOUNTER — APPOINTMENT (OUTPATIENT)
Dept: PULMONOLOGY | Facility: CLINIC | Age: 43
End: 2023-03-06

## 2023-03-09 ENCOUNTER — APPOINTMENT (OUTPATIENT)
Dept: PULMONOLOGY | Facility: CLINIC | Age: 43
End: 2023-03-09
Payer: MEDICAID

## 2023-03-09 ENCOUNTER — LABORATORY RESULT (OUTPATIENT)
Age: 43
End: 2023-03-09

## 2023-03-09 VITALS
HEART RATE: 117 BPM | TEMPERATURE: 97.7 F | BODY MASS INDEX: 36.78 KG/M2 | DIASTOLIC BLOOD PRESSURE: 83 MMHG | WEIGHT: 221 LBS | OXYGEN SATURATION: 85 % | SYSTOLIC BLOOD PRESSURE: 125 MMHG

## 2023-03-09 DIAGNOSIS — R10.9 UNSPECIFIED ABDOMINAL PAIN: ICD-10-CM

## 2023-03-09 PROCEDURE — 99215 OFFICE O/P EST HI 40 MIN: CPT | Mod: 25

## 2023-03-09 NOTE — HISTORY OF PRESENT ILLNESS
[Never] : never [TextBox_4] : ------Patient is here for follow up of her pulmonary htn. ------------------INITIALLY  REFD  WITH   ECHO [DONE  OUTSIDE ] ELEVATED PASP  DILATED RV AND RA---------  HAS BEEN TOLD IN THE PAST SHE HAS ??? ASTHMA-----long-standing history of dyspnea on exertion-----------------has baseline tachycardia ----...--s. No history of liver dysfunction , collagen vascular disorder or chronic thromboembolic disease. I would classify her dyspnea as WHO  FUNCTIONAL CLASS III-----------no calf tenderness----------SLEEP STUDY SHOWS AHI 0.6 but T 90  < 35 %-----DIAGNOSED  AS PAH ---------WAS ON ADMAPAS  NOW ON   SQ REMODULIN  [ SINCE MAY 2108]  ---S/P PACEMAKER PLACEMENT AT [place]  AND ON METOPROLOL-----\par \par \par 2/3/2022 tested positive on home covid test 2/2/2022- developed shortness of breath, nausea and vomitting- son is positive\par She is vaccinated for covid but not boosted\par Afebrile, drinking but not eating well. O2 sat 94% room air rest- 92% room air w/walk\par \par 2/7/2022 covid pcr negative - did not get MAB\par still with SOUZA - on prednisone with taper\par \par 4/2022 6mwt o2 sat 95% to 94% on oxygen 69meters (135m) stopped d/t severe SOB and elevated HR\par \par 4/15/2022  pulm htn - on remodulin 36 ng (having jaw pain sometimes), sildenafil 10 mg qd, furosemide 20 mg 5x weekly which helped w/edema. She is also having palpitations- has not yet followed up with DR Chavis in cardiology.\par Asthma-occas wheezing- awaiting start of dupixent\par Up to date w/covid vac- declines influenza vac. s/p consult with lung transplant team but needs to lower BMI before being considered- pt has been referred to nutritionist but has not scheduled appt\par \par \par 5/31/22- pulm htn - on remodulin 36 ng SQ (having jaw pain sometimes), sildenafil 10 mg qd, and diuretics daily- doing well from pulm perspectives\par Asthma currently under control- declining biologic therapy\par Was following lung  transplant team but currently on hold d/t BMI\par \par \par 7/11/22- pulm htn--on remodulin 36 ng SQ (having jaw pain sometimes), sildenafil 10 mg qd and diuretics daily [LASIX WO MG---XARELTO \par was in the hospital at [facility]-- given Lasix --- we will slowly increase her dose of Remodulin\par \par 7/22/2022 pulm htn- breathing improved on higher dose of remodulin 40ng, having some diarrhea. Palpitations improved\par \par 9/15 /2022 ----s/p hospitalization for cardiogenic shock due to RV failure with markers of end organ dysfunction,\par started on  as well as Marcela. CTA negative for PE but TTE revealed RA thrombus\par with concern for clot in transit. She underwent catheter directed partial\par thrombus aspiration 8/31, course complicated by L fem pseudoaneurysm for which\par she received thrombin injection on 9/3. Course further complicated by S.\par Lugdunensis bacteremia on BCx from 8/29 s/p drainage of abscess at prior----on IV antibiotics as per ID\par Remodulin subcutaneous access site.--42 ng/kg/min , also on sildenafil 20 mg 3 times daily\par ----\par \par \par SEPT 27 2022-----feels much better on subcu Remodulin 44 ng/kg/min and sildenafil 20 mg 3 times daily on diuretics-on anticoagulation-------- needs to see vascular for left femoral pseudoaneurysm--- H/O---S.Lugdunensis bacteremia on BCx from 8/29 s/p drainage of abscess at prior----on IV antibiotics as per ID --------ALSO HAS Lleft   fem pseudoaneurysm---needs follow up by  vasular ---- --received flu vaccine today------------------------\par \par October 2022: Telehealth Visit via teledoc\par S/P hospitalization for cardiogenic shock c/b bacteremia on IV antibiotics. Followed by ID (Dr. Grover) for labs, antibiotics now d/c'd (10/6)  getting follow up labs w/ID (results pending from 10/19)\par Pulm htn -She reports use of Remodulin 44 ng/kg/min, tolerating new dose well  and Sildenafil (20 mg t.i.d PO) No resp complaints, no palpitations, follows cardiology Dr Paramjit Chavis. On diuretics, xarelto, O2 as needed\par s/p lung transplant eval- was told she was "too well" to be listed and needs to work on getting her BMI down\par \par jan 2022--developed abdominal wall abscess--needs surgical drainage--------subcu Remodulin 44 ng/kg/min and sildenafil 20 mg 3 times daily on diuretics-on anticoagulation--\par \par mario    10 2023--- recent admission to the hospital for abscess on abdominal wall which was drained and received antibiotics and it has healed----------------subcu Remodulin 44 ng/kg/min and sildenafil 20 mg 3 times daily on diuretics-on anticoagulation--I would like to increase Remodulin to 45 ng/kg/min--------- having some URI symptoms we will send a nasal swab continue diuretics----\par \par \par 3/2023---seems stable  pulm htn -remains on remodulin 44ng and sildenafil, diuretics per heart failure Dr Chavis\par c/o worsening SOUZA and nausea x 1week, chroic diarrhea which she attributes to remodulin, and lower abd pain ///??? pancreatitis\par Tacchycardia has improved, on continuous oxygen\par Accompanied by  [TextBox_100] : 1/2014 [TextBox_108] : 0.9 [TextBox_112] : 35.3

## 2023-03-09 NOTE — DISCUSSION/SUMMARY
[FreeTextEntry1] : ----Assessment and Plan--------42 year-old lady with PULMONARY ARTERIAL HYPERTENSION S/P PPM FOR TACHYCARDIA AT [place] ON REMODULIN - S/P LEFT BREAST BIOPSY \par  doing well on remodulin 38 ng ----- ON XARELTO, LASIX AND ALDACTONE -------LOW DOSE PREDNISONE -----is following up with at [facility] for transplant listing-----final decision after the  and psych evaluation\par \par JAN 2023---- ABDOMINAL WALL ABSCESS-----needED surgical drainage-doneE\par MARCH 2023   --- pain abdomen rule out pancreatitis\par \par 1. PULMONARY ART HTN   ---- WHO GROUP I FUNCTIONAL CLASS III ----\par  [AS PER DR FREEMAN WHO DID PULM ANGIO SHE HAS FEATURES OF GROUP I WITH SOME   EVIDENCE OF DISTAL CLOTS] \par  She is on Remodulin 44 ng/kg/min  -will INCREASE TO 45 NG/KG/MIN  once nausea resolves\par  Continue  Sildenafil 20mg qd, Xarelto, ON Lasix 20 mg 5 TIMES A WEEK AND ALDACTONE 25 MG \par \par 2. Asthma -. use  ipratropium and levalbuterol and budesonide. Refuses use of DUPIXENT at this time as she reports her allergies are "not that bad". Has has been steroid dependent likely contributing to her weight. She is hesitant to start biologic injections. \par Has discontinued Flonase and Dymysta nasal spray because of reports epistaxis \par Patient educated about persistent epistaxis to notify team and we will refer to ENT\par \par 3. High BMI (41.9 kg/m2)\par   Precluding lung transplant\par   Referred to nutritionist------ extensive nutritional advice given--- she promises to follow-up on thAT\par   Weight loss program advised\par \par 4. Oxygen medically necessary given severe Pulm Htn, anemia and h/o nocturnal desaturation which could worsen Pulm HTN. Advised oxygen 2 lpm x 24hrs\par \par 5. She is on Xarelto---will be on lifelong anticoagulation---\par \par 6) She has features of ERICA- including sleep disturbance, nocturnal desat, and excessive fatigue- HST was negative for ERICA in 2014 but had desaturation. Will try to repeat HST at next visit\par \par 8. Follows /[place] [facility]\par   ---Also consulted with Dr Lou at [facility]---\par   As per patient, Dr Lou feels she is not a candidate at this time because of good health\par   Although Dr. Lou advised needs to lose wt to be considered for transplant listing in future\par \par 9. ABDOMINAL pain- check labs (drawn in our office today) and get u/s of abdomen rule out pancreatitis, zofran for nausea\par 10- f/u next month or sooner if any problems\par \par \par Vivian Yoon MD, [facility] \par

## 2023-03-10 LAB
ALBUMIN SERPL ELPH-MCNC: 4.3 G/DL
ALP BLD-CCNC: 60 U/L
ALT SERPL-CCNC: 11 U/L
AMYLASE/CREAT SERPL: 44 U/L
ANION GAP SERPL CALC-SCNC: 14 MMOL/L
APPEARANCE: CLEAR
AST SERPL-CCNC: 12 U/L
BILIRUB SERPL-MCNC: 0.6 MG/DL
BILIRUBIN URINE: NEGATIVE
BLOOD URINE: NEGATIVE
BUN SERPL-MCNC: 7 MG/DL
CALCIUM SERPL-MCNC: 8.7 MG/DL
CHLORIDE SERPL-SCNC: 106 MMOL/L
CO2 SERPL-SCNC: 19 MMOL/L
COLOR: NORMAL
CREAT SERPL-MCNC: 0.99 MG/DL
EGFR: 73 ML/MIN/1.73M2
GGT SERPL-CCNC: 17 U/L
GLUCOSE QUALITATIVE U: NEGATIVE
GLUCOSE SERPL-MCNC: 85 MG/DL
KETONES URINE: NEGATIVE
LEUKOCYTE ESTERASE URINE: NEGATIVE
LPL SERPL-CCNC: 26 U/L
NITRITE URINE: NEGATIVE
PH URINE: 6
POTASSIUM SERPL-SCNC: 3.5 MMOL/L
PROT SERPL-MCNC: 7.1 G/DL
PROTEIN URINE: NEGATIVE
SODIUM SERPL-SCNC: 139 MMOL/L
SPECIFIC GRAVITY URINE: 1.01
UROBILINOGEN URINE: NORMAL

## 2023-03-11 LAB
BASOPHILS # BLD AUTO: 0.1 K/UL
BASOPHILS NFR BLD AUTO: 1.1 %
EOSINOPHIL # BLD AUTO: 0.25 K/UL
EOSINOPHIL NFR BLD AUTO: 2.8 %
HCT VFR BLD CALC: 40.3 %
HGB BLD-MCNC: 12.6 G/DL
IMM GRANULOCYTES NFR BLD AUTO: 0.1 %
LYMPHOCYTES # BLD AUTO: 2.35 K/UL
LYMPHOCYTES NFR BLD AUTO: 25.9 %
MAN DIFF?: NORMAL
MCHC RBC-ENTMCNC: 21 PG
MCHC RBC-ENTMCNC: 31.3 GM/DL
MCV RBC AUTO: 67.2 FL
MONOCYTES # BLD AUTO: 0.76 K/UL
MONOCYTES NFR BLD AUTO: 8.4 %
NEUTROPHILS # BLD AUTO: 5.6 K/UL
NEUTROPHILS NFR BLD AUTO: 61.7 %
PLATELET # BLD AUTO: 385 K/UL
RBC # BLD: 6 M/UL
RBC # FLD: 19.1 %
WBC # FLD AUTO: 9.07 K/UL

## 2023-03-13 ENCOUNTER — NON-APPOINTMENT (OUTPATIENT)
Age: 43
End: 2023-03-13

## 2023-03-17 ENCOUNTER — NON-APPOINTMENT (OUTPATIENT)
Age: 43
End: 2023-03-17

## 2023-03-17 ENCOUNTER — APPOINTMENT (OUTPATIENT)
Dept: ELECTROPHYSIOLOGY | Facility: CLINIC | Age: 43
End: 2023-03-17
Payer: MEDICAID

## 2023-03-17 PROCEDURE — 93296 REM INTERROG EVL PM/IDS: CPT

## 2023-03-17 PROCEDURE — 93294 REM INTERROG EVL PM/LDLS PM: CPT

## 2023-03-29 ENCOUNTER — APPOINTMENT (OUTPATIENT)
Dept: HEART FAILURE | Facility: CLINIC | Age: 43
End: 2023-03-29

## 2023-04-10 ENCOUNTER — RX RENEWAL (OUTPATIENT)
Age: 43
End: 2023-04-10

## 2023-05-18 ENCOUNTER — EMERGENCY (EMERGENCY)
Facility: HOSPITAL | Age: 43
LOS: 1 days | Discharge: HOME | End: 2023-05-18
Attending: EMERGENCY MEDICINE
Payer: MEDICAID

## 2023-05-18 VITALS
OXYGEN SATURATION: 94 % | WEIGHT: 214.95 LBS | HEIGHT: 66 IN | RESPIRATION RATE: 20 BRPM | TEMPERATURE: 99 F | SYSTOLIC BLOOD PRESSURE: 108 MMHG | DIASTOLIC BLOOD PRESSURE: 83 MMHG | HEART RATE: 113 BPM

## 2023-05-18 VITALS
OXYGEN SATURATION: 99 % | DIASTOLIC BLOOD PRESSURE: 71 MMHG | RESPIRATION RATE: 18 BRPM | HEART RATE: 99 BPM | SYSTOLIC BLOOD PRESSURE: 93 MMHG

## 2023-05-18 DIAGNOSIS — Z95.0 PRESENCE OF CARDIAC PACEMAKER: Chronic | ICD-10-CM

## 2023-05-18 DIAGNOSIS — Z98.51 TUBAL LIGATION STATUS: Chronic | ICD-10-CM

## 2023-05-18 LAB
ALBUMIN SERPL ELPH-MCNC: 4.3 G/DL — SIGNIFICANT CHANGE UP (ref 3.3–5)
ALP SERPL-CCNC: 65 U/L — SIGNIFICANT CHANGE UP (ref 40–120)
ALT FLD-CCNC: 8 U/L — LOW (ref 10–45)
ANION GAP SERPL CALC-SCNC: 14 MMOL/L — SIGNIFICANT CHANGE UP (ref 5–17)
ANISOCYTOSIS BLD QL: SLIGHT — SIGNIFICANT CHANGE UP
APTT BLD: 48.8 SEC — HIGH (ref 27.5–35.5)
AST SERPL-CCNC: 9 U/L — LOW (ref 10–40)
BASE EXCESS BLDV CALC-SCNC: -1.6 MMOL/L — SIGNIFICANT CHANGE UP (ref -2–3)
BASOPHILS # BLD AUTO: 0 K/UL — SIGNIFICANT CHANGE UP (ref 0–0.2)
BASOPHILS NFR BLD AUTO: 0 % — SIGNIFICANT CHANGE UP (ref 0–2)
BILIRUB SERPL-MCNC: 0.9 MG/DL — SIGNIFICANT CHANGE UP (ref 0.2–1.2)
BUN SERPL-MCNC: 8 MG/DL — SIGNIFICANT CHANGE UP (ref 7–23)
CA-I SERPL-SCNC: 1.17 MMOL/L — SIGNIFICANT CHANGE UP (ref 1.15–1.33)
CALCIUM SERPL-MCNC: 8.9 MG/DL — SIGNIFICANT CHANGE UP (ref 8.4–10.5)
CHLORIDE BLDV-SCNC: 102 MMOL/L — SIGNIFICANT CHANGE UP (ref 96–108)
CHLORIDE SERPL-SCNC: 102 MMOL/L — SIGNIFICANT CHANGE UP (ref 96–108)
CO2 BLDV-SCNC: 25 MMOL/L — SIGNIFICANT CHANGE UP (ref 22–26)
CO2 SERPL-SCNC: 22 MMOL/L — SIGNIFICANT CHANGE UP (ref 22–31)
CREAT SERPL-MCNC: 1.1 MG/DL — SIGNIFICANT CHANGE UP (ref 0.5–1.3)
DACRYOCYTES BLD QL SMEAR: SLIGHT — SIGNIFICANT CHANGE UP
EGFR: 64 ML/MIN/1.73M2 — SIGNIFICANT CHANGE UP
ELLIPTOCYTES BLD QL SMEAR: SLIGHT — SIGNIFICANT CHANGE UP
EOSINOPHIL # BLD AUTO: 0 K/UL — SIGNIFICANT CHANGE UP (ref 0–0.5)
EOSINOPHIL NFR BLD AUTO: 0 % — SIGNIFICANT CHANGE UP (ref 0–6)
GAS PNL BLDV: 135 MMOL/L — LOW (ref 136–145)
GAS PNL BLDV: SIGNIFICANT CHANGE UP
GLUCOSE BLDV-MCNC: 92 MG/DL — SIGNIFICANT CHANGE UP (ref 70–99)
GLUCOSE SERPL-MCNC: 86 MG/DL — SIGNIFICANT CHANGE UP (ref 70–99)
HCG SERPL-ACNC: <2 MIU/ML — SIGNIFICANT CHANGE UP
HCO3 BLDV-SCNC: 24 MMOL/L — SIGNIFICANT CHANGE UP (ref 22–29)
HCT VFR BLD CALC: 45.2 % — HIGH (ref 34.5–45)
HCT VFR BLDA CALC: 42 % — SIGNIFICANT CHANGE UP (ref 34.5–46.5)
HGB BLD CALC-MCNC: 14.1 G/DL — SIGNIFICANT CHANGE UP (ref 11.7–16.1)
HGB BLD-MCNC: 13.4 G/DL — SIGNIFICANT CHANGE UP (ref 11.5–15.5)
INR BLD: 1.87 RATIO — HIGH (ref 0.88–1.16)
LACTATE BLDV-MCNC: 1.7 MMOL/L — SIGNIFICANT CHANGE UP (ref 0.5–2)
LYMPHOCYTES # BLD AUTO: 2.65 K/UL — SIGNIFICANT CHANGE UP (ref 1–3.3)
LYMPHOCYTES # BLD AUTO: 23.7 % — SIGNIFICANT CHANGE UP (ref 13–44)
MANUAL SMEAR VERIFICATION: SIGNIFICANT CHANGE UP
MCHC RBC-ENTMCNC: 19.6 PG — LOW (ref 27–34)
MCHC RBC-ENTMCNC: 29.6 GM/DL — LOW (ref 32–36)
MCV RBC AUTO: 66.1 FL — LOW (ref 80–100)
MICROCYTES BLD QL: SLIGHT — SIGNIFICANT CHANGE UP
MONOCYTES # BLD AUTO: 1.17 K/UL — HIGH (ref 0–0.9)
MONOCYTES NFR BLD AUTO: 10.5 % — SIGNIFICANT CHANGE UP (ref 2–14)
NEUTROPHILS # BLD AUTO: 7.35 K/UL — SIGNIFICANT CHANGE UP (ref 1.8–7.4)
NEUTROPHILS NFR BLD AUTO: 65.8 % — SIGNIFICANT CHANGE UP (ref 43–77)
NT-PROBNP SERPL-SCNC: 453 PG/ML — HIGH (ref 0–300)
OVALOCYTES BLD QL SMEAR: SLIGHT — SIGNIFICANT CHANGE UP
PCO2 BLDV: 41 MMHG — SIGNIFICANT CHANGE UP (ref 39–42)
PH BLDV: 7.37 — SIGNIFICANT CHANGE UP (ref 7.32–7.43)
PLAT MORPH BLD: ABNORMAL
PLATELET # BLD AUTO: 441 K/UL — HIGH (ref 150–400)
PO2 BLDV: 34 MMHG — SIGNIFICANT CHANGE UP (ref 25–45)
POIKILOCYTOSIS BLD QL AUTO: SLIGHT — SIGNIFICANT CHANGE UP
POTASSIUM BLDV-SCNC: 3.3 MMOL/L — LOW (ref 3.5–5.1)
POTASSIUM SERPL-MCNC: 3.2 MMOL/L — LOW (ref 3.5–5.3)
POTASSIUM SERPL-SCNC: 3.2 MMOL/L — LOW (ref 3.5–5.3)
PROT SERPL-MCNC: 7.3 G/DL — SIGNIFICANT CHANGE UP (ref 6–8.3)
PROTHROM AB SERPL-ACNC: 21.6 SEC — HIGH (ref 10.5–13.4)
RBC # BLD: 6.84 M/UL — HIGH (ref 3.8–5.2)
RBC # FLD: 19.9 % — HIGH (ref 10.3–14.5)
RBC BLD AUTO: ABNORMAL
SAO2 % BLDV: 43.4 % — LOW (ref 67–88)
SODIUM SERPL-SCNC: 138 MMOL/L — SIGNIFICANT CHANGE UP (ref 135–145)
SPHEROCYTES BLD QL SMEAR: SLIGHT — SIGNIFICANT CHANGE UP
TARGETS BLD QL SMEAR: SLIGHT — SIGNIFICANT CHANGE UP
TROPONIN T, HIGH SENSITIVITY RESULT: 7 NG/L — SIGNIFICANT CHANGE UP (ref 0–51)
WBC # BLD: 11.17 K/UL — HIGH (ref 3.8–10.5)
WBC # FLD AUTO: 11.17 K/UL — HIGH (ref 3.8–10.5)

## 2023-05-18 PROCEDURE — 85025 COMPLETE CBC W/AUTO DIFF WBC: CPT

## 2023-05-18 PROCEDURE — 84702 CHORIONIC GONADOTROPIN TEST: CPT

## 2023-05-18 PROCEDURE — 85730 THROMBOPLASTIN TIME PARTIAL: CPT

## 2023-05-18 PROCEDURE — 36000 PLACE NEEDLE IN VEIN: CPT | Mod: 59

## 2023-05-18 PROCEDURE — 93005 ELECTROCARDIOGRAM TRACING: CPT | Mod: XU

## 2023-05-18 PROCEDURE — 82947 ASSAY GLUCOSE BLOOD QUANT: CPT

## 2023-05-18 PROCEDURE — 36573 INSJ PICC RS&I 5 YR+: CPT

## 2023-05-18 PROCEDURE — 85610 PROTHROMBIN TIME: CPT

## 2023-05-18 PROCEDURE — 83880 ASSAY OF NATRIURETIC PEPTIDE: CPT

## 2023-05-18 PROCEDURE — 82330 ASSAY OF CALCIUM: CPT

## 2023-05-18 PROCEDURE — 84295 ASSAY OF SERUM SODIUM: CPT

## 2023-05-18 PROCEDURE — 99284 EMERGENCY DEPT VISIT MOD MDM: CPT

## 2023-05-18 PROCEDURE — 94640 AIRWAY INHALATION TREATMENT: CPT

## 2023-05-18 PROCEDURE — 99285 EMERGENCY DEPT VISIT HI MDM: CPT | Mod: 25

## 2023-05-18 PROCEDURE — 83605 ASSAY OF LACTIC ACID: CPT

## 2023-05-18 PROCEDURE — C1751: CPT

## 2023-05-18 PROCEDURE — 85018 HEMOGLOBIN: CPT

## 2023-05-18 PROCEDURE — 85014 HEMATOCRIT: CPT

## 2023-05-18 PROCEDURE — 80053 COMPREHEN METABOLIC PANEL: CPT

## 2023-05-18 PROCEDURE — 82803 BLOOD GASES ANY COMBINATION: CPT

## 2023-05-18 PROCEDURE — 84484 ASSAY OF TROPONIN QUANT: CPT

## 2023-05-18 PROCEDURE — 71045 X-RAY EXAM CHEST 1 VIEW: CPT

## 2023-05-18 PROCEDURE — 71045 X-RAY EXAM CHEST 1 VIEW: CPT | Mod: 26

## 2023-05-18 PROCEDURE — 10061 I&D ABSCESS COMP/MULTIPLE: CPT

## 2023-05-18 PROCEDURE — 82435 ASSAY OF BLOOD CHLORIDE: CPT

## 2023-05-18 PROCEDURE — 96375 TX/PRO/DX INJ NEW DRUG ADDON: CPT | Mod: XU

## 2023-05-18 PROCEDURE — 96374 THER/PROPH/DIAG INJ IV PUSH: CPT | Mod: XU

## 2023-05-18 PROCEDURE — 10060 I&D ABSCESS SIMPLE/SINGLE: CPT

## 2023-05-18 PROCEDURE — 84132 ASSAY OF SERUM POTASSIUM: CPT

## 2023-05-18 RX ORDER — HYDROMORPHONE HYDROCHLORIDE 2 MG/ML
1 INJECTION INTRAMUSCULAR; INTRAVENOUS; SUBCUTANEOUS ONCE
Refills: 0 | Status: DISCONTINUED | OUTPATIENT
Start: 2023-05-18 | End: 2023-05-18

## 2023-05-18 RX ORDER — OXYCODONE HYDROCHLORIDE 5 MG/1
5 TABLET ORAL ONCE
Refills: 0 | Status: DISCONTINUED | OUTPATIENT
Start: 2023-05-18 | End: 2023-05-18

## 2023-05-18 RX ORDER — LIDOCAINE HYDROCHLORIDE AND EPINEPHRINE 10; 10 MG/ML; UG/ML
20 INJECTION, SOLUTION INFILTRATION; PERINEURAL ONCE
Refills: 0 | Status: COMPLETED | OUTPATIENT
Start: 2023-05-18 | End: 2023-05-18

## 2023-05-18 RX ORDER — DIPHENHYDRAMINE HCL 50 MG
50 CAPSULE ORAL ONCE
Refills: 0 | Status: COMPLETED | OUTPATIENT
Start: 2023-05-18 | End: 2023-05-18

## 2023-05-18 RX ORDER — HYDROMORPHONE HYDROCHLORIDE 2 MG/ML
0.5 INJECTION INTRAMUSCULAR; INTRAVENOUS; SUBCUTANEOUS ONCE
Refills: 0 | Status: DISCONTINUED | OUTPATIENT
Start: 2023-05-18 | End: 2023-05-18

## 2023-05-18 RX ORDER — FUROSEMIDE 40 MG
40 TABLET ORAL ONCE
Refills: 0 | Status: COMPLETED | OUTPATIENT
Start: 2023-05-18 | End: 2023-05-18

## 2023-05-18 RX ORDER — IPRATROPIUM BROMIDE 0.2 MG/ML
500 SOLUTION, NON-ORAL INHALATION ONCE
Refills: 0 | Status: COMPLETED | OUTPATIENT
Start: 2023-05-18 | End: 2023-05-18

## 2023-05-18 RX ADMIN — Medication 500 MICROGRAM(S): at 11:50

## 2023-05-18 RX ADMIN — Medication 2 TABLET(S): at 15:32

## 2023-05-18 RX ADMIN — Medication 50 MILLIGRAM(S): at 17:11

## 2023-05-18 RX ADMIN — LIDOCAINE HYDROCHLORIDE AND EPINEPHRINE 20 MILLILITER(S): 10; 10 INJECTION, SOLUTION INFILTRATION; PERINEURAL at 13:18

## 2023-05-18 RX ADMIN — Medication 40 MILLIGRAM(S): at 15:35

## 2023-05-18 RX ADMIN — OXYCODONE HYDROCHLORIDE 5 MILLIGRAM(S): 5 TABLET ORAL at 13:13

## 2023-05-18 RX ADMIN — HYDROMORPHONE HYDROCHLORIDE 1 MILLIGRAM(S): 2 INJECTION INTRAMUSCULAR; INTRAVENOUS; SUBCUTANEOUS at 15:05

## 2023-05-18 NOTE — ED PROVIDER NOTE - PHYSICAL EXAMINATION
Physical Exam:  Gen: NAD, AOx3, non-toxic appearing, able to ambulate without assistance  Head: NCAT  HEENT: EOMI, PEERLA, normal conjunctiva, tongue midline, oral mucosa moist  Lung: CTAB, no respiratory distress, no wheezes/rhonchi/rales B/L, speaking in full sentences  CV: RRR, no murmurs, rubs or gallops  Abd: soft, 3*3cm R ab wall abscess with induration and fluctuance and very tender, no cellulitis, ND, no guarding, no rigidity, no rebound tenderness  MSK: no visible deformities, ROM normal in UE/LE, no back pain  Neuro: No focal sensory or motor deficits  Skin: Warm, well perfused, no rash, no leg swelling  Psych: normal affect, calm

## 2023-05-18 NOTE — ED PROVIDER NOTE - NSFOLLOWUPINSTRUCTIONS_ED_ALL_ED_FT
1. Bactrim antibiotics for 10 days.  2. Tylenol and ice pack to area for pain control.  3. Warm compresses 20min at a time to area to promote drainage of wound.  4. Pull out packing in 2 days.  5. Follow up PCP and your doctors for wound check and further treatment.  6. Return for any concerns.  7. Recommend Hibiclens wash you and your  daily for 2 days, then monthly thereafter to reduce colonization of staph.

## 2023-05-18 NOTE — ED ADULT NURSE NOTE - OBJECTIVE STATEMENT
42Y female, A&Ox4, pmh of pulmonary HTN, right sided heart failure, svt, pacemaker. pt presents to the ED c/o sob and mid sternal cp that feels like needles. pt states she woke up with morning with sob and  chest pain, waited to see if it would resolve and then decided to call ems when it didn't. pt has Remodulin pump in place. states she has enough medicine in pump to last the rest of the day and a backup in a cooler. on 2L o2 at baseline. pt also complaining of reoccurring abd abscess that has been drained in the past.

## 2023-05-18 NOTE — ED PROCEDURE NOTE - PROCEDURE ADDITIONAL DETAILS
Peripheral IV access in the Emergency Department obtained under dynamic ultrasound guidance with dark nonpulsatile blood return.  Catheter was flushed afterwards without any resistance or resulting extravasation.  IV catheter confirmed in compressible vein after insertion.
about 10cc of purulent material drained from R abdominal wall abscess. Flushed. PAcking placed.  Bacitracin and gauze.

## 2023-05-18 NOTE — ED PROVIDER NOTE - NSICDXPASTSURGICALHX_GEN_ALL_CORE_FT
PAST SURGICAL HISTORY:  H/O tubal ligation     History of pacemaker 12/19 - [employer] model Ingevity 4469    History of tubal ligation     Pacemaker

## 2023-05-18 NOTE — CONSULT NOTE ADULT - SUBJECTIVE AND OBJECTIVE BOX
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 Was in usual state of health yesterday. Also reports one of her abdominal wall abscesses is more tender. Dyspnea resolved this morning but she feels that she is retaining a bit more fluid than usual which she reports that she feels in her joints and abdomen. No leg swelling. Currently in ED with plan for I&D and abx.    PAST MEDICAL & SURGICAL HISTORY:  Tachycardia      Anemia      Pulmonary hypertension      Pulmonary embolism      Asthma  On home O2 nightly at 2L via NC      PE (pulmonary thromboembolism)  on Xarelto 10 mg daily      Sinusitis      Corneal disorder      Right heart failure      CAD (coronary artery disease)      H/O pulmonary hypertension      Pulmonary embolism      Asthma      History of tachycardia      On supplemental oxygen by nasal cannula  O2 @ 2L      Pacemaker      Skin mass  left upper thigh mass      History of tubal ligation      Pacemaker      H/O tubal ligation      History of pacemaker  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          FAMILY HISTORY:  Family history of diabetes mellitus  in brother    Family history of diabetes mellitus in mother    FH: anemia        SOCIAL HISTORY:  Smoking: [ x] Never Smoked [ ] Former Smoker (__ packs x ___ years) [ ] Current Smoker  (__ packs x ___ years)  Substance Use: [ ] Never Used [ ] Used ____  EtOH Use:  Marital Status: [ ] Single [x ]  [ ]  [ ]   Sexual History:   Occupation:  Recent Travel:  Country of Birth:  Advance Directives:    Allergies    penicillin (Rash)  adhesives (Rash)  vancomycin (Rash)    Intolerances    albuterol (Other; Rash)      HOME MEDICATIONS:  Home Medications:  Atrovent 500 mcg/2.5 mL inhalation solution: 2.5 milliliter(s) inhaled every 6 hours, As Needed (04 Jan 2023 15:44)  Atrovent HFA 17 mcg/inh inhalation aerosol: puff(s) inhaled every 6 hours, As Needed (04 Jan 2023 15:44)  omeprazole 40 mg oral delayed release capsule: 1 cap(s) orally once a day (04 Jan 2023 15:44)  Remodulin 5 mg/mL injectable solution: patient is taking 44ng/kg/min via her own SQ PUMP (04 Jan 2023 15:45)      REVIEW OF SYSTEMS:  Constitutional: [x ] negative [ ] fevers [ ] chills [ ] weight loss [ ] weight gain  HEENT: [ ] negative [ ] dry eyes [ ] eye irritation [ ] postnasal drip [ ] nasal congestion  CV: [ ] negative  [ ] chest pain [ ] orthopnea [ ] palpitations [ ] murmur  Resp: [ ] negative [ ] cough [ ] shortness of breath [x ] dyspnea [ ] wheezing [ ] sputum [ ] hemoptysis  GI: [ ] negative [ ] nausea [ ] vomiting [ ] diarrhea [ ] constipation [ ] abd pain [ ] dysphagia   : [ ] negative [ ] dysuria [ ] nocturia [ ] hematuria [ ] increased urinary frequency  Musculoskeletal: [ ] negative [ ] back pain [ ] myalgias [ ] arthralgias [ ] fracture  Skin: [ ] negative [x ] abd abscess with pain  Neurological: [ ] negative [ ] headache [ ] dizziness [ ] syncope [ ] weakness [ ] numbness  Psychiatric: [ ] negative [ ] anxiety [ ] depression  Endocrine: [ ] negative [ ] diabetes [ ] thyroid problem  Hematologic/Lymphatic: [ ] negative [ ] anemia [ ] bleeding problem  Allergic/Immunologic: [ ] negative [ ] itchy eyes [ ] nasal discharge [ ] hives [ ] angioedema  [x ] All other systems negative  [ ] Unable to assess ROS because ________    OBJECTIVE:  ICU Vital Signs Last 24 Hrs  T(C): 36.7 (18 May 2023 14:47), Max: 37.1 (18 May 2023 09:38)  T(F): 98.1 (18 May 2023 14:47), Max: 98.7 (18 May 2023 09:38)  HR: 98 (18 May 2023 14:47) (98 - 113)  BP: 103/66 (18 May 2023 14:47) (103/66 - 114/89)  BP(mean): --  ABP: --  ABP(mean): --  RR: 19 (18 May 2023 14:47) (19 - 20)  SpO2: 95% (18 May 2023 14:47) (94% - 96%)    O2 Parameters below as of 18 May 2023 14:47  Patient On (Oxygen Delivery Method): nasal cannula  O2 Flow (L/min): 2            CAPILLARY BLOOD GLUCOSE          PHYSICAL EXAM:  Gen: NAD  HEENT: MMM, PERRL, EOMI  CV: Regular  Lung: CTAB  Abd: Rt abd with I&D site present  Ext: WWP, no CCE  Skin: No rash  Neuro: A&Ox3, no focal deficits    HOSPITAL MEDICATIONS:  Standing Meds:      PRN Meds:      LABS:    The Labs were reviewed by me   The Radiology was reviewed by me    EKG tracing reviewed by me    05-18    138  |  102  |  8   ----------------------------<  86  3.2<L>   |  22  |  1.10    Ca    8.9      18 May 2023 12:06    TPro  7.3  /  Alb  4.3  /  TBili  0.9  /  DBili  x   /  AST  9<L>  /  ALT  8<L>  /  AlkPhos  65  05-18          PT/INR - ( 18 May 2023 12:06 )   PT: 21.6 sec;   INR: 1.87 ratio         PTT - ( 18 May 2023 12:06 )  PTT:48.8 sec                                        13.4   11.17 )-----------( 441      ( 18 May 2023 12:06 )             45.2     CAPILLARY BLOOD GLUCOSE            MICROBIOLOGY:       RADIOLOGY:  [ ] Reviewed and interpreted by me    PULMONARY FUNCTION TESTS:    EKG:

## 2023-05-18 NOTE — ED PROVIDER NOTE - ATTENDING CONTRIBUTION TO CARE
Pt with PAH, prior abdominal wall abscesses with prior +MRSA and bacteremia with STaph lugdenensis present with new R ab wall abscess, noted one week ago, worsening this week with pain and swelling.  No fever.  Pt also with chronic dyspnea and intermittent palpations, feels related to pain of abdominal wall.      PE: well appearing, nontoxic, no respiratory distress, clear lungs, slight tachycardia.  3*3cm R ab wall abscess with induration and fluctuance and very tender.  No cellulitis.    MDM: ab wall abscess, cover for MRSA and staph lugdenensis (Both suspectible to Bactrim), I&D, chronic dyspnea with acute sxs likely pain related but will check labs, check cbc r/o anemia or leukocytosis, check bmp to r/o metabolic derangement and lyte imbalance, troponin, cxr r/o infiltrate. Pt with PAH, prior abdominal wall abscesses with prior +MRSA and bacteremia with STaph lugdenensis present with new R ab wall abscess, noted one week ago, worsening this week with pain and swelling.  No fever.  Pt also with chronic dyspnea and intermittent palpations, feels related to pain of abdominal wall.      PE: well appearing, nontoxic, no respiratory distress, clear lungs, slight tachycardia.  3*3cm R ab wall abscess with induration and fluctuance and very tender.  No cellulitis.    MDM: ab wall abscess, cover for MRSA and staph lugdenensis (Both suspectible to Bactrim), I&D, chronic dyspnea with acute sxs likely pain related but will check labs, check cbc r/o anemia or leukocytosis, check bmp to r/o metabolic derangement and lyte imbalance, troponin, cxr r/o infiltrate.    Progress Note 1500: I&D performed bedside, good return of purulent material, packing placed, antibiotics given, Bactrim first choice given sensitivities, also educated about Hibiclens for her and , Pulm saw patient, recommends dose of lasix prior to dc, no indications for inpt currently.

## 2023-05-18 NOTE — CONSULT NOTE ADULT - ASSESSMENT
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 Yoon in the outpatient setting

## 2023-05-18 NOTE — CONSULT NOTE ADULT - ATTENDING COMMENTS
Attending Attestation:    Patient seen and examined with resident/fellow.  Agree with above except as noted.     41 yo obese female with pulmonary HTN comes to ED for abdominal pain. Pt take Remodulin sq and has SQ abscess at insertion point of Remodulin. Pt has had similar abd abscess in past drained.  Pt is currently not SOB. She had an episode of dyspnea this am but now feels breathing is at baseline. In general on regimen of sildenafil and Remodulin  her breathing has been good.  Pt is on 2 liters NC at home.  She is a lung transplant patient but needs to lose wt. She has been losing weight recently.      PE Pt lying in bed NAD. 2 liters NC 02 sat 97%  Lungs clear to A  Cor Si split S2  Abd with Sq abscess RLQ . Tender to touch. No rebound tenderness.  Ext: No edema      A/P  41 yo female with pulmonary HTN with abdominal abscess.  1. Pulm HTN Continue sildenafil and Remodulin. Continue  Home Lasix. May need to increase dose.  2. Abd abscess from sq Remodulin injections.  TO be drained  by ED. ED will D/C to home on antibiotics. F/U Dr. Yoon.

## 2023-05-18 NOTE — ED ADULT NURSE REASSESSMENT NOTE - NS ED NURSE REASSESS COMMENT FT1
pt states she does not feel ready to go home due to feeling itchy and hot. MD Hassan stated she would assess pt.

## 2023-05-18 NOTE — ED PROVIDER NOTE - CARE PLAN
1 Principal Discharge DX:	Abdominal wall abscess  Secondary Diagnosis:	PAH (pulmonary artery hypertension)

## 2023-05-18 NOTE — ED PROVIDER NOTE - PATIENT PORTAL LINK FT
You can access the FollowMyHealth Patient Portal offered by [facility] by registering at the following website: [email or web address]. By joining [employer]’s FollowMyHealth portal, you will also be able to view your health information using other applications (apps) compatible with our system.

## 2023-05-18 NOTE — ED PROVIDER NOTE - OBJECTIVE STATEMENT
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 Was in usual state of health yesterday. Also reports one of her abdominal wall abscesses is more tender. 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 Was in usual state of health yesterday. Also reports her R abdominal wall abscess, noted one week ago, worsening this week with pain and swelling. No fever, chest pain, nausea, vomiting, abdominal pain.

## 2023-05-18 NOTE — ED ADULT NURSE REASSESSMENT NOTE - NS ED NURSE REASSESS COMMENT FT1
report received from Nitesh BEGUM, patient a&ox4, slightly tachypneic on 2L NC, tachycardic to 110s which is patient baseline. Patient requiring oxygen for transfer, nonemergent ambulance called for transportation.

## 2023-05-18 NOTE — ED ADULT NURSE NOTE - NSFALLRISKINTERV_ED_ALL_ED

## 2023-05-19 RX ORDER — AZELASTINE HYDROCHLORIDE 137 UG/1
137 SPRAY, METERED NASAL TWICE DAILY
Qty: 1 | Refills: 3 | Status: ACTIVE | COMMUNITY
Start: 2023-05-19 | End: 1900-01-01

## 2023-05-26 ENCOUNTER — LABORATORY RESULT (OUTPATIENT)
Age: 43
End: 2023-05-26

## 2023-05-26 ENCOUNTER — APPOINTMENT (OUTPATIENT)
Dept: PULMONOLOGY | Facility: CLINIC | Age: 43
End: 2023-05-26
Payer: MEDICAID

## 2023-05-26 VITALS
SYSTOLIC BLOOD PRESSURE: 107 MMHG | TEMPERATURE: 97 F | DIASTOLIC BLOOD PRESSURE: 77 MMHG | HEART RATE: 113 BPM | OXYGEN SATURATION: 87 % | HEIGHT: 65 IN | BODY MASS INDEX: 35.65 KG/M2 | WEIGHT: 214 LBS | RESPIRATION RATE: 15 BRPM

## 2023-05-26 DIAGNOSIS — R63.0 ANOREXIA: ICD-10-CM

## 2023-05-26 DIAGNOSIS — R11.0 NAUSEA: ICD-10-CM

## 2023-05-26 PROCEDURE — 99215 OFFICE O/P EST HI 40 MIN: CPT | Mod: 25

## 2023-05-26 RX ORDER — EPINEPHRINE 0.3 MG/.3ML
0.3 INJECTION INTRAMUSCULAR
Qty: 1 | Refills: 0 | Status: DISCONTINUED | COMMUNITY
Start: 2021-12-09 | End: 2023-05-26

## 2023-05-26 RX ORDER — PREDNISONE 10 MG/1
10 TABLET ORAL
Qty: 25 | Refills: 1 | Status: DISCONTINUED | COMMUNITY
Start: 2023-01-25 | End: 2023-05-26

## 2023-05-26 RX ORDER — DIGOXIN 125 UG/1
125 TABLET ORAL DAILY
Qty: 30 | Refills: 2 | Status: ACTIVE | COMMUNITY
Start: 2023-02-27 | End: 1900-01-01

## 2023-05-26 RX ORDER — AZITHROMYCIN 250 MG/1
250 TABLET, FILM COATED ORAL
Qty: 1 | Refills: 0 | Status: DISCONTINUED | COMMUNITY
Start: 2023-01-25 | End: 2023-05-26

## 2023-05-26 NOTE — DISCUSSION/SUMMARY
[FreeTextEntry1] : ----Assessment and Plan--------42 year-old lady with PULMONARY ARTERIAL HYPERTENSION S/P PPM FOR TACHYCARDIA AT [place] ON REMODULIN - S/P LEFT BREAST BIOPSY \par  doing well on remodulin 38 ng ----- ON XARELTO, LASIX AND ALDACTONE -------LOW DOSE PREDNISONE -----is following up with at [facility] for transplant listing-----final decision after the  and psych evaluation\par \par JAN 2023---- ABDOMINAL WALL ABSCESS-----needED surgical drainage-doneE\par MARCH 2023   --- pain abdomen rule out pancreatitis\par \par 1. PULMONARY ART HTN   ---- WHO GROUP I FUNCTIONAL CLASS III ----\par  [AS PER DR FREEMAN WHO DID PULM ANGIO SHE HAS FEATURES OF GROUP I WITH SOME   EVIDENCE OF DISTAL CLOTS] \par  She is on Remodulin 445  NG/KG/MIN  ---\par  Continue  Sildenafil 20mg qd, Xarelto, ON Lasix 20 mg 5 TIMES A WEEK AND ALDACTONE 25 MG \par \par 2. Asthma -. use  ipratropium and levalbuterol and budesonide. Refuses use of DUPIXENT at this time as she reports her allergies are "not that bad". Has has been previously  steroid dependent likely contributing to her weight. She is hesitant to start biologic injections. \par Has discontinued Flonase and Dymysta nasal spray \par \par \par 3. High BMI (41.9 kg/m2)\par   Precluding lung transplant\par   Referred to nutritionist------ extensive nutritional advice given--- she promises to follow-up on thAT\par   Weight loss program advised\par \par 4. Oxygen medically necessary given severe Pulm Htn, anemia and h/o nocturnal desaturation which could worsen Pulm HTN. Advised oxygen 2 lpm x 24hrs\par \par 5. She is on Xarelto---will be on lifelong anticoagulation---\par \par 6) She has features of ERICA- including sleep disturbance, nocturnal desat, and excessive fatigue- HST was negative for ERICA in 2014 but had desaturation. Will try to repeat HST at next visit\par \par 8. Follows w/[facility]\par   ---Also consulted with Dr Lou at [facility]---\par   As per patient, Dr Lou feels she is not a candidate at this time because of good health\par   Although Dr. Lou advised needs to lose wt to be considered for transplant listing in future\par \par 9. ABDOMINAL pain- check labs (drawn in our office today) zofran for nausea\par if abd pain persists -to come to ER\par r/o pancreatitis- - -\par 10- ? infection at old remodulin site- s/p c/s- restart doxy\par 11- f/u in 3m\par \par Vivian Yoon MD, FCCP \par

## 2023-05-26 NOTE — HISTORY OF PRESENT ILLNESS
[Never] : never [TextBox_4] : ------Patient is here for follow up of her pulmonary htn. ------------------INITIALLY  REFD  WITH   ECHO [DONE  OUTSIDE ] ELEVATED PASP  DILATED RV AND RA---------  HAS BEEN TOLD IN THE PAST SHE HAS ??? ASTHMA-----long-standing history of dyspnea on exertion-----------------has baseline tachycardia ----...--s. No history of liver dysfunction , collagen vascular disorder or chronic thromboembolic disease. I would classify her dyspnea as WHO  FUNCTIONAL CLASS III-----------no calf tenderness----------SLEEP STUDY SHOWS AHI 0.6 but T 90  < 35 %-----DIAGNOSED  AS PAH ---------WAS ON ADMAPAS  NOW ON   SQ REMODULIN  [ SINCE MAY 2018]  ---S/P PACEMAKER PLACEMENT AT [place]  AND ON METOPROLOL-----\par \par \par 2/3/2022 tested positive on home covid test 2/2/2022- developed shortness of breath, nausea and vomitting- son is positive\par She is vaccinated for covid but not boosted\par Afebrile, drinking but not eating well. O2 sat 94% room air rest- 92% room air w/walk\par \par 2/7/2022 covid pcr negative - did not get MAB\par still with SOUZA - on prednisone with taper\par \par 4/2022 6mwt o2 sat 95% to 94% on oxygen 69meters (135m) stopped d/t severe SOB and elevated HR\par \par 4/15/2022  pulm htn - on remodulin 36 ng (having jaw pain sometimes), sildenafil 10 mg qd, furosemide 20 mg 5x weekly which helped w/edema. She is also having palpitations- has not yet followed up with DR Chavis in cardiology.\par Asthma-occas wheezing- awaiting start of dupixent\par Up to date w/covid vac- declines influenza vac. s/p consult with lung transplant team but needs to lower BMI before being considered- pt has been referred to nutritionist but has not scheduled appt\par \par \par 5/31/22- pulm htn - on remodulin 36 ng SQ (having jaw pain sometimes), sildenafil 10 mg qd, and diuretics daily- doing well from pulm perspectives\par Asthma currently under control- declining biologic therapy\par Was following lung  transplant team but currently on hold d/t BMI\par \par \par 7/11/22- pulm htn--on remodulin 36 ng SQ (having jaw pain sometimes), sildenafil 10 mg qd and diuretics daily [LASIX WO MG---XARELTO \par was in the hospital at [facility]-- given Lasix --- we will slowly increase her dose of Remodulin\par \par 7/22/2022 pulm htn- breathing improved on higher dose of remodulin 40ng, having some diarrhea. Palpitations improved\par \par 9/15 /2022 ----s/p hospitalization for cardiogenic shock due to RV failure with markers of end organ dysfunction,\par started on  as well as Marcela. CTA negative for PE but TTE revealed RA thrombus\par with concern for clot in transit. She underwent catheter directed partial\par thrombus aspiration 8/31, course complicated by L fem pseudoaneurysm for which\par she received thrombin injection on 9/3. Course further complicated by S.\par Lugdunensis bacteremia on BCx from 8/29 s/p drainage of abscess at prior----on IV antibiotics as per ID\par Remodulin subcutaneous access site.--42 ng/kg/min , also on sildenafil 20 mg 3 times daily\par ----\par \par \par SEPT 27 2022-----feels much better on subcu Remodulin 44 ng/kg/min and sildenafil 20 mg 3 times daily on diuretics-on anticoagulation-------- needs to see vascular for left femoral pseudoaneurysm--- H/O---S.Lugdunensis bacteremia on BCx from 8/29 s/p drainage of abscess at prior----on IV antibiotics as per ID --------ALSO HAS Lleft   fem pseudoaneurysm---needs follow up by  vasular ---- --received flu vaccine today------------------------\par \par October 2022: Telehealth Visit via teledoc\par S/P hospitalization for cardiogenic shock c/b bacteremia on IV antibiotics. Followed by ID (Dr. Grover) for labs, antibiotics now d/c'd (10/6)  getting follow up labs w/ID (results pending from 10/19)\par Pulm htn -She reports use of Remodulin 44 ng/kg/min, tolerating new dose well  and Sildenafil (20 mg t.i.d PO) No resp complaints, no palpitations, follows cardiology Dr Paramjit Chavis. On diuretics, xarelto, O2 as needed\par s/p lung transplant eval- was told she was "too well" to be listed and needs to work on getting her BMI down\par \par jan 2022--developed abdominal wall abscess--needs surgical drainage--------subcu Remodulin 44 ng/kg/min and sildenafil 20 mg 3 times daily on diuretics-on anticoagulation--\par \par mario    10 2023--- recent admission to the hospital for abscess on abdominal wall which was drained and received antibiotics and it has healed----------------subcu Remodulin 44 ng/kg/min and sildenafil 20 mg 3 times daily on diuretics-on anticoagulation--I would like to increase Remodulin to 45 ng/kg/min--------- having some URI symptoms we will send a nasal swab continue diuretics----\par \par \par 3/2023---seems stable  pulm htn -remains on remodulin 44ng and sildenafil, diuretics per heart failure Dr Chavis\par c/o worsening SOUZA and nausea x 1week, chroic diarrhea which she attributes to remodulin, and lower abd pain ///??? pancreatitis\par Tacchycardia has improved, on continuous oxygen\par Accompanied by \par \par \par 5/2023 accompanied by \par pulm htn -remains on remodulin 44ng and sildenafil, diuretics per heart failure Dr Chavis\par old remodulin site healing w/minimal drainage (previously treated w/antibiotics)\par \par c/o nausea, lightheadedness, headache [TextBox_100] : 1/2014 [TextBox_108] : 0.9 [TextBox_112] : 35.3

## 2023-05-26 NOTE — END OF VISIT
[Time Spent: ___ minutes] : I have spent [unfilled] minutes of time on the encounter. [FreeTextEntry3] : \par  I, Dr. Vivian Yoon  personally performed the evaluation and management (E/M) services for this established patient who presents today with (a) new problem(s)/exacerbation of (an) existing condition(s). That E/M includes conducting the clinically appropriate interval history &/or exam, assessing all new/exacerbated conditions, and establishing a new plan of care. Today, my NAYELI, Aminah Monroy NP, , was here to observe my evaluation and management service for this new problem/exacerbated condition and follow the plan of care established by me going forward.\par

## 2023-05-28 ENCOUNTER — NON-APPOINTMENT (OUTPATIENT)
Age: 43
End: 2023-05-28

## 2023-05-30 DIAGNOSIS — E87.6 HYPOKALEMIA: ICD-10-CM

## 2023-05-30 LAB
ALBUMIN SERPL ELPH-MCNC: 4.5 G/DL
ALP BLD-CCNC: 72 U/L
ALT SERPL-CCNC: 15 U/L
AMYLASE/CREAT SERPL: 56 U/L
ANION GAP SERPL CALC-SCNC: 20 MMOL/L
AST SERPL-CCNC: 17 U/L
BACTERIA SPEC CULT: ABNORMAL
BILIRUB SERPL-MCNC: 0.6 MG/DL
BUN SERPL-MCNC: 13 MG/DL
CALCIUM SERPL-MCNC: 9.2 MG/DL
CHLORIDE SERPL-SCNC: 95 MMOL/L
CO2 SERPL-SCNC: 21 MMOL/L
CREAT SERPL-MCNC: 0.98 MG/DL
EGFR: 74 ML/MIN/1.73M2
ESTIMATED AVERAGE GLUCOSE: 117 MG/DL
GGT SERPL-CCNC: 22 U/L
GLUCOSE SERPL-MCNC: 116 MG/DL
HBA1C MFR BLD HPLC: 5.7 %
LPL SERPL-CCNC: 42 U/L
NT-PROBNP SERPL-MCNC: 100 PG/ML
POTASSIUM SERPL-SCNC: 3.4 MMOL/L
PROT SERPL-MCNC: 7.5 G/DL
SODIUM SERPL-SCNC: 136 MMOL/L
TOTAL IGE SMQN RAST: 1282 KU/L

## 2023-05-30 RX ORDER — POTASSIUM CHLORIDE 1500 MG/1
20 TABLET, FILM COATED, EXTENDED RELEASE ORAL
Qty: 30 | Refills: 1 | Status: ACTIVE | COMMUNITY
Start: 2023-05-30 | End: 1900-01-01

## 2023-06-07 ENCOUNTER — APPOINTMENT (OUTPATIENT)
Dept: PULMONOLOGY | Facility: CLINIC | Age: 43
End: 2023-06-07
Payer: MEDICAID

## 2023-06-07 ENCOUNTER — INPATIENT (INPATIENT)
Facility: HOSPITAL | Age: 43
LOS: 4 days | Discharge: HOME | DRG: 292 | End: 2023-06-12
Attending: INTERNAL MEDICINE | Admitting: HOSPITALIST
Payer: MEDICAID

## 2023-06-07 VITALS
SYSTOLIC BLOOD PRESSURE: 124 MMHG | TEMPERATURE: 97.7 F | DIASTOLIC BLOOD PRESSURE: 84 MMHG | RESPIRATION RATE: 17 BRPM | HEART RATE: 105 BPM | OXYGEN SATURATION: 96 %

## 2023-06-07 VITALS
OXYGEN SATURATION: 95 % | HEART RATE: 90 BPM | RESPIRATION RATE: 20 BRPM | SYSTOLIC BLOOD PRESSURE: 119 MMHG | HEIGHT: 66 IN | WEIGHT: 222.01 LBS | DIASTOLIC BLOOD PRESSURE: 87 MMHG | TEMPERATURE: 98 F

## 2023-06-07 VITALS
SYSTOLIC BLOOD PRESSURE: 124 MMHG | WEIGHT: 225 LBS | DIASTOLIC BLOOD PRESSURE: 84 MMHG | OXYGEN SATURATION: 96 % | HEIGHT: 65 IN | HEART RATE: 91 BPM | BODY MASS INDEX: 37.49 KG/M2

## 2023-06-07 DIAGNOSIS — I27.0 PRIMARY PULMONARY HYPERTENSION: ICD-10-CM

## 2023-06-07 DIAGNOSIS — Z86.79 PERSONAL HISTORY OF OTHER DISEASES OF THE CIRCULATORY SYSTEM: ICD-10-CM

## 2023-06-07 DIAGNOSIS — R06.09 OTHER FORMS OF DYSPNEA: ICD-10-CM

## 2023-06-07 DIAGNOSIS — I50.810 RIGHT HEART FAILURE, UNSPECIFIED: ICD-10-CM

## 2023-06-07 DIAGNOSIS — Z29.9 ENCOUNTER FOR PROPHYLACTIC MEASURES, UNSPECIFIED: ICD-10-CM

## 2023-06-07 DIAGNOSIS — I47.1 SUPRAVENTRICULAR TACHYCARDIA: ICD-10-CM

## 2023-06-07 DIAGNOSIS — I50.9 HEART FAILURE, UNSPECIFIED: ICD-10-CM

## 2023-06-07 DIAGNOSIS — Z95.0 PRESENCE OF CARDIAC PACEMAKER: Chronic | ICD-10-CM

## 2023-06-07 DIAGNOSIS — R00.2 PALPITATIONS: ICD-10-CM

## 2023-06-07 DIAGNOSIS — Z98.51 TUBAL LIGATION STATUS: Chronic | ICD-10-CM

## 2023-06-07 DIAGNOSIS — R07.9 CHEST PAIN, UNSPECIFIED: ICD-10-CM

## 2023-06-07 DIAGNOSIS — I51.3 INTRACARDIAC THROMBOSIS, NOT ELSEWHERE CLASSIFIED: ICD-10-CM

## 2023-06-07 LAB
ALBUMIN SERPL ELPH-MCNC: 3.7 G/DL — SIGNIFICANT CHANGE UP (ref 3.3–5)
ALP SERPL-CCNC: 59 U/L — SIGNIFICANT CHANGE UP (ref 40–120)
ALT FLD-CCNC: 10 U/L — SIGNIFICANT CHANGE UP (ref 10–45)
ANION GAP SERPL CALC-SCNC: 12 MMOL/L — SIGNIFICANT CHANGE UP (ref 5–17)
ANISOCYTOSIS BLD QL: SLIGHT — SIGNIFICANT CHANGE UP
APTT BLD: 41.2 SEC — HIGH (ref 27.5–35.5)
AST SERPL-CCNC: 10 U/L — SIGNIFICANT CHANGE UP (ref 10–40)
BASOPHILS # BLD AUTO: 0 K/UL — SIGNIFICANT CHANGE UP (ref 0–0.2)
BASOPHILS NFR BLD AUTO: 0 % — SIGNIFICANT CHANGE UP (ref 0–2)
BILIRUB SERPL-MCNC: 0.7 MG/DL — SIGNIFICANT CHANGE UP (ref 0.2–1.2)
BUN SERPL-MCNC: 10 MG/DL — SIGNIFICANT CHANGE UP (ref 7–23)
CALCIUM SERPL-MCNC: 8.2 MG/DL — LOW (ref 8.4–10.5)
CHLORIDE SERPL-SCNC: 106 MMOL/L — SIGNIFICANT CHANGE UP (ref 96–108)
CO2 SERPL-SCNC: 20 MMOL/L — LOW (ref 22–31)
CREAT SERPL-MCNC: 0.93 MG/DL — SIGNIFICANT CHANGE UP (ref 0.5–1.3)
EGFR: 79 ML/MIN/1.73M2 — SIGNIFICANT CHANGE UP
ELLIPTOCYTES BLD QL SMEAR: SLIGHT — SIGNIFICANT CHANGE UP
EOSINOPHIL # BLD AUTO: 0.07 K/UL — SIGNIFICANT CHANGE UP (ref 0–0.5)
EOSINOPHIL NFR BLD AUTO: 0.9 % — SIGNIFICANT CHANGE UP (ref 0–6)
FLUAV AG NPH QL: SIGNIFICANT CHANGE UP
FLUBV AG NPH QL: SIGNIFICANT CHANGE UP
GIANT PLATELETS BLD QL SMEAR: PRESENT — SIGNIFICANT CHANGE UP
GLUCOSE SERPL-MCNC: 81 MG/DL — SIGNIFICANT CHANGE UP (ref 70–99)
HCT VFR BLD CALC: 37.7 % — SIGNIFICANT CHANGE UP (ref 34.5–45)
HGB BLD-MCNC: 11.6 G/DL — SIGNIFICANT CHANGE UP (ref 11.5–15.5)
INR BLD: 1.46 RATIO — HIGH (ref 0.88–1.16)
LYMPHOCYTES # BLD AUTO: 1.67 K/UL — SIGNIFICANT CHANGE UP (ref 1–3.3)
LYMPHOCYTES # BLD AUTO: 20.8 % — SIGNIFICANT CHANGE UP (ref 13–44)
MAGNESIUM SERPL-MCNC: 1.7 MG/DL — SIGNIFICANT CHANGE UP (ref 1.6–2.6)
MANUAL SMEAR VERIFICATION: SIGNIFICANT CHANGE UP
MCHC RBC-ENTMCNC: 20.5 PG — LOW (ref 27–34)
MCHC RBC-ENTMCNC: 30.8 GM/DL — LOW (ref 32–36)
MCV RBC AUTO: 66.7 FL — LOW (ref 80–100)
MICROCYTES BLD QL: SLIGHT — SIGNIFICANT CHANGE UP
MONOCYTES # BLD AUTO: 0.28 K/UL — SIGNIFICANT CHANGE UP (ref 0–0.9)
MONOCYTES NFR BLD AUTO: 3.5 % — SIGNIFICANT CHANGE UP (ref 2–14)
MRSA PCR RESULT.: SIGNIFICANT CHANGE UP
NEUTROPHILS # BLD AUTO: 5.99 K/UL — SIGNIFICANT CHANGE UP (ref 1.8–7.4)
NEUTROPHILS NFR BLD AUTO: 74.8 % — SIGNIFICANT CHANGE UP (ref 43–77)
NT-PROBNP SERPL-SCNC: 784 PG/ML — HIGH (ref 0–300)
PLAT MORPH BLD: ABNORMAL
PLATELET # BLD AUTO: 363 K/UL — SIGNIFICANT CHANGE UP (ref 150–400)
POIKILOCYTOSIS BLD QL AUTO: SLIGHT — SIGNIFICANT CHANGE UP
POLYCHROMASIA BLD QL SMEAR: SLIGHT — SIGNIFICANT CHANGE UP
POTASSIUM SERPL-MCNC: 3.4 MMOL/L — LOW (ref 3.5–5.3)
POTASSIUM SERPL-SCNC: 3.4 MMOL/L — LOW (ref 3.5–5.3)
PROT SERPL-MCNC: 6.4 G/DL — SIGNIFICANT CHANGE UP (ref 6–8.3)
PROTHROM AB SERPL-ACNC: 17 SEC — HIGH (ref 10.5–13.4)
RBC # BLD: 5.65 M/UL — HIGH (ref 3.8–5.2)
RBC # FLD: 20.8 % — HIGH (ref 10.3–14.5)
RBC BLD AUTO: ABNORMAL
RSV RNA NPH QL NAA+NON-PROBE: SIGNIFICANT CHANGE UP
S AUREUS DNA NOSE QL NAA+PROBE: SIGNIFICANT CHANGE UP
SARS-COV-2 RNA SPEC QL NAA+PROBE: SIGNIFICANT CHANGE UP
SODIUM SERPL-SCNC: 138 MMOL/L — SIGNIFICANT CHANGE UP (ref 135–145)
TROPONIN T, HIGH SENSITIVITY RESULT: 7 NG/L — SIGNIFICANT CHANGE UP (ref 0–51)
WBC # BLD: 8.01 K/UL — SIGNIFICANT CHANGE UP (ref 3.8–10.5)
WBC # FLD AUTO: 8.01 K/UL — SIGNIFICANT CHANGE UP (ref 3.8–10.5)

## 2023-06-07 PROCEDURE — 99223 1ST HOSP IP/OBS HIGH 75: CPT | Mod: GC

## 2023-06-07 PROCEDURE — 99285 EMERGENCY DEPT VISIT HI MDM: CPT

## 2023-06-07 PROCEDURE — 99215 OFFICE O/P EST HI 40 MIN: CPT

## 2023-06-07 PROCEDURE — 99222 1ST HOSP IP/OBS MODERATE 55: CPT

## 2023-06-07 PROCEDURE — 71045 X-RAY EXAM CHEST 1 VIEW: CPT | Mod: 26

## 2023-06-07 RX ORDER — FUROSEMIDE 40 MG
40 TABLET ORAL ONCE
Refills: 0 | Status: COMPLETED | OUTPATIENT
Start: 2023-06-07 | End: 2023-06-07

## 2023-06-07 RX ORDER — RIVAROXABAN 15 MG-20MG
20 KIT ORAL DAILY
Refills: 0 | Status: DISCONTINUED | OUTPATIENT
Start: 2023-06-07 | End: 2023-06-12

## 2023-06-07 RX ORDER — IPRATROPIUM BROMIDE 0.2 MG/ML
500 SOLUTION, NON-ORAL INHALATION EVERY 6 HOURS
Refills: 0 | Status: DISCONTINUED | OUTPATIENT
Start: 2023-06-07 | End: 2023-06-12

## 2023-06-07 RX ORDER — PANTOPRAZOLE SODIUM 20 MG/1
40 TABLET, DELAYED RELEASE ORAL
Refills: 0 | Status: DISCONTINUED | OUTPATIENT
Start: 2023-06-07 | End: 2023-06-12

## 2023-06-07 RX ORDER — IPRATROPIUM BROMIDE 0.2 MG/ML
1 SOLUTION, NON-ORAL INHALATION EVERY 6 HOURS
Refills: 0 | Status: DISCONTINUED | OUTPATIENT
Start: 2023-06-07 | End: 2023-06-12

## 2023-06-07 RX ORDER — IPRATROPIUM BROMIDE 0.2 MG/ML
1 SOLUTION, NON-ORAL INHALATION EVERY 6 HOURS
Refills: 0 | Status: DISCONTINUED | OUTPATIENT
Start: 2023-06-07 | End: 2023-06-07

## 2023-06-07 RX ORDER — SPIRONOLACTONE 25 MG/1
25 TABLET, FILM COATED ORAL
Refills: 0 | Status: DISCONTINUED | OUTPATIENT
Start: 2023-06-07 | End: 2023-06-07

## 2023-06-07 RX ORDER — FUROSEMIDE 40 MG
40 TABLET ORAL
Refills: 0 | Status: DISCONTINUED | OUTPATIENT
Start: 2023-06-07 | End: 2023-06-09

## 2023-06-07 RX ORDER — CHLORHEXIDINE GLUCONATE 213 G/1000ML
1 SOLUTION TOPICAL
Refills: 0 | Status: DISCONTINUED | OUTPATIENT
Start: 2023-06-07 | End: 2023-06-12

## 2023-06-07 RX ORDER — SPIRONOLACTONE 25 MG/1
25 TABLET, FILM COATED ORAL
Refills: 0 | Status: DISCONTINUED | OUTPATIENT
Start: 2023-06-07 | End: 2023-06-12

## 2023-06-07 RX ORDER — IPRATROPIUM BROMIDE 0.2 MG/ML
500 SOLUTION, NON-ORAL INHALATION ONCE
Refills: 0 | Status: COMPLETED | OUTPATIENT
Start: 2023-06-07 | End: 2023-06-07

## 2023-06-07 RX ORDER — TIOTROPIUM BROMIDE 18 UG/1
2 CAPSULE ORAL; RESPIRATORY (INHALATION) DAILY
Refills: 0 | Status: DISCONTINUED | OUTPATIENT
Start: 2023-06-07 | End: 2023-06-07

## 2023-06-07 RX ADMIN — SPIRONOLACTONE 25 MILLIGRAM(S): 25 TABLET, FILM COATED ORAL at 17:35

## 2023-06-07 RX ADMIN — Medication 20 MILLIGRAM(S): at 20:22

## 2023-06-07 RX ADMIN — Medication 40 MILLIGRAM(S): at 15:38

## 2023-06-07 RX ADMIN — Medication 500 MICROGRAM(S): at 13:05

## 2023-06-07 NOTE — ED PROVIDER NOTE - OBJECTIVE STATEMENT
Patient is a 42 year old female with past medical history significant for CHF, pulmonary hypertension, and asthma presents to the Emergency Department with chief complaint of dyspnea.  Patient notes dyspnea on exertion for the past 4-5 days.  Patient notes she is on 2L of home nasal cannula and has required increased oxygen.  Patient notes minimal chest discomfort.  Patient denies cough, fever, abdominal pain, or other physical complaints.  No sick contacts or recent travel.

## 2023-06-07 NOTE — H&P ADULT - HISTORY OF PRESENT ILLNESS
41 y/o F PMH Pulmonary Hypertension (On Rimodulin and Xarelto, on home 2L O2), RA thrombus s/p thrombectomy, multiple abdominal wall abscesses (last 5/18 s/p I&D), presenting with increased dyspnea on exertion. The patient reports for the past week she's been experiencing increased shortness of breath with minimal exertion, even having SOB going to the bathroom. Her cardiologist Dr Chavis, and her pulmonologist Dr. Yoon have been trying to increase her Rimodulin however this has been limited due to her tolerance of increases. They held off over the past week because the patient was having abdominal pain and nausea/vomiting due to gastroenteritis. Today the patient reported increasing back pain, which usually happens when she has fluid retention. The patient called Dr. Yoon who recommended the patient go to the ED for further evaluation. The patient denies any fevers, chills, nausea, vomiting at this time. She only reports increased dyspnea on exertion.     In the ED, pt symptoms improved w/ increased O2 administration via NC. Heart failure team was called, and patient admitted for further management.

## 2023-06-07 NOTE — DISCUSSION/SUMMARY
[FreeTextEntry1] : ----Assessment and Plan--------42 year-old lady with PULMONARY ARTERIAL HYPERTENSION S/P PPM FOR TACHYCARDIA AT [place] ON REMODULIN - S/P LEFT BREAST BIOPSY \par \par \par JAN 2023---- ABDOMINAL WALL ABSCESS-----needED surgical drainage-doneE\par MARCH 2023   --- pain abdomen rule out pancreatitis\par \par \par *****Comes in today for acute, unscheduled  visit******\par For CP, palpitation and SOUZA\par  Unable to walk without having palpitations/tachycardia after a few steps, in wheelchair, using oxygen at 2lpm\par \par PULMONARY ART HTN   ---- WHO GROUP I FUNCTIONAL CLASS III ----\par ] \par  She is on Remodulin 44 NG/KG/MIN  ---oxygen 2 lpm x 24hrs\par  Continue  Sildenafil 20mg qd,, ON Lasix 20 mg 5 TIMES A WEEK AND ALDACTONE 25 MG \par \par High BMI (41.9 kg/m2)\par precluding lung transplant\par \par \par She is on Xarelto---will be on lifelong anticoagulation---\par Anemia- likely worse d/t current menstrual cycle\par hypokalemia given ongoing diarrhea (last K 3.4 on 5/30)  \par \par recently completed antib for MRSA (skin infection) at old remodulin site\par Case discussed with Dr Yoon- plan to admit \par May require up titration of remodulin with close monitoring\par \par Heart Failure Team Dr Paramjit Chavis to follow in hospital \par \par If you have any questions  I can be reached  at # [phone] (office).\par \par Aminah Monroy, ANP-C\par for \par Vivian Yoon MD, FCCP \par Pulmonary, Critical Care and Sleep Medicine\par \par

## 2023-06-07 NOTE — ED PROVIDER NOTE - PROGRESS NOTE DETAILS
DO Gilbert: patient re-assessed and resting in no acute distress.  patient reports mild improvement of symptoms at this time on supplemental oxygen.  advanced heart failure team consulted and were already aware of patient and will evaluate at bedside.  patient agrees with decision for admission.

## 2023-06-07 NOTE — ED PROVIDER NOTE - PHYSICAL EXAMINATION
PHYSICAL EXAM:  GENERAL: + obese, mild distress  HEAD:  Atraumatic, Normocephalic  EYES: EOMI, PERRLA, conjunctiva and sclera clear  ENT: No erythema/pallor/petechiae/lesions; TMs clear b/l  NECK: Supple, No JVD  LUNG: CTA b/l; no r/r/w; speaking in complete sentences  HEART: RRR, +S1/S2; No m/r/g  ABDOMEN: soft, NT/ND; BS audible   EXTREMITIES:  2+ Peripheral Pulses, brisk cap refill. +1 pitting edema in bilateral lower extremities   NERVOUS SYSTEM:  AAOx3, speech clear. No sensory/motor deficits   MSK: FROM all 4 extremities, full and equal strength  SKIN: No rashes or lesions

## 2023-06-07 NOTE — H&P ADULT - PROBLEM SELECTOR PLAN 1
Likely in the setting of CHF exacerbation vs pHTN exacerbation. May be due to lack of pt tolerance to increasing doses of Rimodulin.   - Titrate O2 for SpO2 > 90%  - Continue Rimodulin  - Transition to IV diuresis as per pulmonology. Will f/u regarding dosing  - Repeat TTE Likely in the setting of CHF exacerbation vs pHTN exacerbation. May be due to lack of pt tolerance to increasing doses of Rimodulin.   - Titrate O2 for SpO2 > 90%  - Continue Rimodulin  - Lasix 40 mg IV BID as per heart failure  - Repeat TTE as per heart failure   - Recs appreciated from Heart failure and Pulmonology

## 2023-06-07 NOTE — H&P ADULT - ATTENDING COMMENTS
41 y/o F w severe Pulmonary Hypertension (On Rimodulin and Xarelto, on home 2L O2), RA thrombus s/p thrombectomy a/w acute on chronic RHF and hypoxia.     Continue aggressive diuresis, monitor symptoms. HF and Pulm teams following.

## 2023-06-07 NOTE — H&P ADULT - PROBLEM SELECTOR PLAN 2
Hx of Type 1 and 4 pulmonary hypertension on Rimodulin and Xarelto  - Continue spironolactone - must be given 1 hour after sildenafil   - Continue home sildenafil TID   - Follow up cardiology and pulmonology recommendations Hx of Type 1 and 4 pulmonary hypertension on Rimodulin and Xarelto  - Increase spironolactone as per HF to 25 mg BID  - Continue home sildenafil TID

## 2023-06-07 NOTE — H&P ADULT - PROBLEM SELECTOR PROBLEM 3
Test Reason : 

Blood Pressure : ***/*** mmHG

Vent. Rate : 094 BPM     Atrial Rate : 288 BPM

   P-R Int : 000 ms          QRS Dur : 084 ms

    QT Int : 344 ms       P-R-T Axes : 088 -06 -09 degrees

   QTc Int : 430 ms

 

Atrial fibrillation

Inferior infarct (cited on or before 11-JAN-2018)

Abnormal ECG

When compared with ECG of 11-JAN-2018 22:09, (Unconfirmed)

Atrial fibrillation  has replaced Atrial fibrillation

Nonspecific T wave abnormality, worse in Lateral leads

Confirmed by DR. VARINDER CAMACHO ([id number]) on 2/8/2018 6:58:12 PM

 

Referred By:  MIKAYLA           Confirmed By:DR. VARINDER CAMACHO Right heart failure

## 2023-06-07 NOTE — ED ADULT NURSE NOTE - NSICDXPASTSURGICALHX_GEN_ALL_CORE_FT
PAST SURGICAL HISTORY:  H/O tubal ligation     History of pacemaker 12/19 - [employer] model Ingevity 4469    History of tubal ligation     Pacemaker

## 2023-06-07 NOTE — CONSULT NOTE ADULT - SUBJECTIVE AND OBJECTIVE BOX
CHIEF COMPLAINT:Patient is a 42y old  Female who presents with a chief complaint of dyspnea     HPI: 42 y.o. female with PAH, hx of PE, PPM presenting with dyspnea, nausea and feeling of fluid overload. The patient states she was having symptoms since Saturday. The dyspnea is worse with very minimal movement, states by walking a few feet she get's short of breath. Also endorse worsening nausea over the last few days as well. She feels fluid overload, states she can feel it in her knees and her hands. Does not take daily weights at home. She had her lasix dose increased to 40mg every other day about a week ago by Dr. Chavis from heart failure. She denies fever, chills, night sweats, cough, nasal congestion, chest pain, abd. pain, diarrhea, constipation, dysuria, urinary frequency, leg swelling, joint pain, and rashes.       PAST MEDICAL & SURGICAL HISTORY:  Tachycardia      Anemia      Pulmonary hypertension      Pulmonary embolism      Asthma  On home O2 nightly at 2L via NC      PE (pulmonary thromboembolism)  on Xarelto 10 mg daily      Sinusitis      Corneal disorder      Right heart failure      CAD (coronary artery disease)      H/O pulmonary hypertension      Pulmonary embolism      Asthma      History of tachycardia      On supplemental oxygen by nasal cannula  O2 @ 2L      Pacemaker      Skin mass  left upper thigh mass      History of tubal ligation      Pacemaker      H/O tubal ligation      History of pacemaker  12/19 - [employer] model Ingevity 4469          FAMILY HISTORY:  Family history of diabetes mellitus  in brother    Family history of diabetes mellitus in mother    FH: anemia        SOCIAL HISTORY:  Smoking: [x ] Never Smoked [ ] Former Smoker (__ packs x ___ years) [ ] Current Smoker  (__ packs x ___ years)  Substance Use: [x ] Never Used [ ] Used ____  EtOH Use:  Marital Status: [ ] Single [x ]  [ ]  [ ]   Sexual History:   Occupation:  Recent Travel:  Country of Birth:  Advance Directives:    Allergies    vancomycin (Rash)  penicillin (Rash)  adhesives (Rash)    Intolerances    albuterol (Other; Rash)      HOME MEDICATIONS:  Home Medications:  Atrovent 500 mcg/2.5 mL inhalation solution: 2.5 milliliter(s) inhaled every 6 hours, As Needed (07 Jun 2023 14:02)  Atrovent HFA 17 mcg/inh inhalation aerosol: puff(s) inhaled every 6 hours, As Needed (07 Jun 2023 14:09)  furosemide 20 mg oral tablet: 2 tab(s) orally every other day (07 Jun 2023 14:04)  omeprazole 40 mg oral delayed release capsule: 1 cap(s) orally once a day (07 Jun 2023 14:09)  Remodulin 5 mg/mL injectable solution: patient is taking 44ng/kg/min via her own SQ PUMP (07 Jun 2023 14:02)      REVIEW OF SYSTEMS:  Constitutional: [ x] negative [ ] fevers [ ] chills [ ] weight loss [ ] weight gain  HEENT: [x ] negative [ ] dry eyes [ ] eye irritation [ ] postnasal drip [ ] nasal congestion  CV: [ ] negative  [ ] chest pain [ ] orthopnea [x ] palpitations [ ] murmur  Resp: [ ] negative [ ] cough [x ] shortness of breath [x ] dyspnea [ ] wheezing [ ] sputum [ ] hemoptysis  GI: [x ] negative [ ] nausea [ ] vomiting [ ] diarrhea [ ] constipation [ ] abd pain [ ] dysphagia   : [ x] negative [ ] dysuria [ ] nocturia [ ] hematuria [ ] increased urinary frequency  Musculoskeletal: [x ] negative [ ] back pain [ ] myalgias [ ] arthralgias [ ] fracture  Skin: [ x] negative [ ] rash [ ] itch  Neurological: [x ] negative [ ] headache [ ] dizziness [ ] syncope [ ] weakness [ ] numbness  Psychiatric: [x ] negative [ ] anxiety [ ] depression  Endocrine: [ x] negative [ ] diabetes [ ] thyroid problem  Hematologic/Lymphatic: [ x] negative [ ] anemia [ ] bleeding problem  Allergic/Immunologic: [x ] negative [ ] itchy eyes [ ] nasal discharge [ ] hives [ ] angioedema  [ ] All other systems negative  [ ] Unable to assess ROS because ________    OBJECTIVE:  ICU Vital Signs Last 24 Hrs  T(C): 36.4 (07 Jun 2023 11:49), Max: 36.6 (07 Jun 2023 11:30)  T(F): 97.6 (07 Jun 2023 11:49), Max: 97.9 (07 Jun 2023 11:30)  HR: 93 (07 Jun 2023 11:49) (90 - 93)  BP: 120/73 (07 Jun 2023 11:49) (119/87 - 120/73)  BP(mean): --  ABP: --  ABP(mean): --  RR: 22 (07 Jun 2023 11:49) (20 - 22)  SpO2: 95% (07 Jun 2023 11:49) (95% - 95%)    O2 Parameters below as of 07 Jun 2023 11:49  Patient On (Oxygen Delivery Method): nasal cannula  O2 Flow (L/min): 3            CAPILLARY BLOOD GLUCOSE          PHYSICAL EXAM:  GENERAL: NAD, well-groomed, well-developed  EYES: EOMI, PERRLA, conjunctiva and sclera clear  ENMT: No tonsillar erythema, exudates, or enlargement; Moist mucous membranes, Good dentition, No lesions  CHEST/LUNG: Clear to auscultation bilaterally; No rales, rhonchi, wheezing, or rubs  HEART: Regular rate and rhythm; No murmurs, rubs, or gallops  ABDOMEN: Soft, Nontender, Nondistended; Bowel sounds present  VASCULAR:  2+ Peripheral Pulses, No clubbing, cyanosis, or edema  LYMPH: No lymphadenopathy noted  SKIN: No rashes or lesions  NERVOUS SYSTEM:  Alert & Oriented X3, Good concentration    HOSPITAL MEDICATIONS:  Standing Meds:  chlorhexidine 2% Cloths 1 Application(s) Topical <User Schedule>  pantoprazole    Tablet 40 milliGRAM(s) Oral before breakfast  predniSONE   Tablet 10 milliGRAM(s) Oral daily  Remodulin (Treprostinil) SQ infusion 1 Dose(s) 1 Dose(s) SubCutaneous Continuous Pump  rivaroxaban 20 milliGRAM(s) Oral daily  sildenafil (REVATIO) 20 milliGRAM(s) Oral <User Schedule>  spironolactone 25 milliGRAM(s) Oral <User Schedule>      PRN Meds:  ipratropium 17 MICROgram(s) HFA Inhaler 1 Puff(s) Inhalation every 6 hours PRN      LABS:    The Labs were reviewed by me   The Radiology was reviewed by me    EKG tracing reviewed by me    06-07    138  |  106  |  10  ----------------------------<  81  3.4<L>   |  20<L>  |  0.93    Ca    8.2<L>      07 Jun 2023 12:24  Mg     1.7     06-07    TPro  6.4  /  Alb  3.7  /  TBili  0.7  /  DBili  x   /  AST  10  /  ALT  10  /  AlkPhos  59  06-07    Magnesium, Serum: 1.7 mg/dL (06-07-23 @ 12:24)        PT/INR - ( 07 Jun 2023 12:24 )   PT: 17.0 sec;   INR: 1.46 ratio         PTT - ( 07 Jun 2023 12:24 )  PTT:41.2 sec                                        11.6   8.01  )-----------( 363      ( 07 Jun 2023 12:24 )             37.7     CAPILLARY BLOOD GLUCOSE        Blood Gas Source Venous: Venous (06-07-23 @ 12:25)      MICROBIOLOGY:       RADIOLOGY:  [x ] Reviewed and interpreted by me  < from: Xray Chest 1 View- PORTABLE-Urgent (06.07.23 @ 12:32) >  FINDINGS:  There is a left-sided pacemaker noted with leads overlying the right   atrium and right ventricle.  The heart is not enlarged.  There are enlarged michelle bilaterally consistent with enlarged pulmonary   arteries. Its not significantly changed from the prior study.  The lungs are clear.  There are no pleural effusions.  There is no pneumothorax.    IMPRESSION:    Enlarged pulmonary arteries bilaterally.  Clear lungs.    < end of copied text >      PULMONARY FUNCTION TESTS:    EKG:   CHIEF COMPLAINT: Patient is a 42y old  Female who presents with a chief complaint of dyspnea     HPI: 42 y.o. female with PAH, hx of PE, PPM presenting with dyspnea, nausea and feeling of fluid overload. The patient states she was having symptoms since Saturday. The dyspnea is worse with very minimal movement, states by walking a few feet she get's short of breath. Also endorse worsening nausea over the last few days as well. She feels fluid overload, states she can feel it in her knees and her hands. Does not take daily weights at home. She had her lasix dose increased to 40mg every other day about a week ago by Dr. Chvais from heart failure. She denies fever, chills, night sweats, cough, nasal congestion, chest pain, abd. pain, diarrhea, constipation, dysuria, urinary frequency, leg swelling, joint pain, and rashes.       PAST MEDICAL & SURGICAL HISTORY:  Tachycardia      Anemia      Pulmonary hypertension      Pulmonary embolism      Asthma  On home O2 nightly at 2L via NC      PE (pulmonary thromboembolism)  on Xarelto 10 mg daily      Sinusitis      Corneal disorder      Right heart failure      CAD (coronary artery disease)      H/O pulmonary hypertension      Pulmonary embolism      Asthma      History of tachycardia      On supplemental oxygen by nasal cannula  O2 @ 2L      Pacemaker      Skin mass  left upper thigh mass      History of tubal ligation      Pacemaker      H/O tubal ligation      History of pacemaker  12/19 - [employer] model Ingevity 4469          FAMILY HISTORY:  Family history of diabetes mellitus  in brother    Family history of diabetes mellitus in mother    FH: anemia        SOCIAL HISTORY:  Smoking: [x ] Never Smoked [ ] Former Smoker (__ packs x ___ years) [ ] Current Smoker  (__ packs x ___ years)  Substance Use: [x ] Never Used [ ] Used ____  EtOH Use:  Marital Status: [ ] Single [x ]  [ ]  [ ]   Sexual History:   Occupation:  Recent Travel:  Country of Birth:  Advance Directives:    Allergies    vancomycin (Rash)  penicillin (Rash)  adhesives (Rash)    Intolerances    albuterol (Other; Rash)      HOME MEDICATIONS:  Home Medications:  Atrovent 500 mcg/2.5 mL inhalation solution: 2.5 milliliter(s) inhaled every 6 hours, As Needed (07 Jun 2023 14:02)  Atrovent HFA 17 mcg/inh inhalation aerosol: puff(s) inhaled every 6 hours, As Needed (07 Jun 2023 14:09)  furosemide 20 mg oral tablet: 2 tab(s) orally every other day (07 Jun 2023 14:04)  omeprazole 40 mg oral delayed release capsule: 1 cap(s) orally once a day (07 Jun 2023 14:09)  Remodulin 5 mg/mL injectable solution: patient is taking 44ng/kg/min via her own SQ PUMP (07 Jun 2023 14:02)      REVIEW OF SYSTEMS:  Constitutional: [ x] negative [ ] fevers [ ] chills [ ] weight loss [ ] weight gain  HEENT: [x ] negative [ ] dry eyes [ ] eye irritation [ ] postnasal drip [ ] nasal congestion  CV: [ ] negative  [ ] chest pain [ ] orthopnea [x ] palpitations [ ] murmur  Resp: [ ] negative [ ] cough [x ] shortness of breath [x ] dyspnea [ ] wheezing [ ] sputum [ ] hemoptysis  GI: [x ] negative [ ] nausea [ ] vomiting [ ] diarrhea [ ] constipation [ ] abd pain [ ] dysphagia   : [ x] negative [ ] dysuria [ ] nocturia [ ] hematuria [ ] increased urinary frequency  Musculoskeletal: [x ] negative [ ] back pain [ ] myalgias [ ] arthralgias [ ] fracture  Skin: [ x] negative [ ] rash [ ] itch  Neurological: [x ] negative [ ] headache [ ] dizziness [ ] syncope [ ] weakness [ ] numbness  Psychiatric: [x ] negative [ ] anxiety [ ] depression  Endocrine: [ x] negative [ ] diabetes [ ] thyroid problem  Hematologic/Lymphatic: [ x] negative [ ] anemia [ ] bleeding problem  Allergic/Immunologic: [x ] negative [ ] itchy eyes [ ] nasal discharge [ ] hives [ ] angioedema  [ ] All other systems negative  [ ] Unable to assess ROS because ________    OBJECTIVE:  ICU Vital Signs Last 24 Hrs  T(C): 36.4 (07 Jun 2023 11:49), Max: 36.6 (07 Jun 2023 11:30)  T(F): 97.6 (07 Jun 2023 11:49), Max: 97.9 (07 Jun 2023 11:30)  HR: 93 (07 Jun 2023 11:49) (90 - 93)  BP: 120/73 (07 Jun 2023 11:49) (119/87 - 120/73)  BP(mean): --  ABP: --  ABP(mean): --  RR: 22 (07 Jun 2023 11:49) (20 - 22)  SpO2: 95% (07 Jun 2023 11:49) (95% - 95%)    O2 Parameters below as of 07 Jun 2023 11:49  Patient On (Oxygen Delivery Method): nasal cannula  O2 Flow (L/min): 3            CAPILLARY BLOOD GLUCOSE          PHYSICAL EXAM:  GENERAL: NAD, well-groomed, well-developed  EYES: EOMI, PERRLA, conjunctiva and sclera clear  ENMT: No tonsillar erythema, exudates, or enlargement; Moist mucous membranes, Good dentition, No lesions  CHEST/LUNG: Clear to auscultation bilaterally; No rales, rhonchi, wheezing, or rubs  HEART: Regular rate and rhythm; No murmurs, rubs, or gallops  ABDOMEN: Soft, Nontender, Nondistended; Bowel sounds present  VASCULAR:  2+ Peripheral Pulses, No clubbing, cyanosis, or edema  LYMPH: No lymphadenopathy noted  SKIN: No rashes or lesions  NERVOUS SYSTEM:  Alert & Oriented X3, Good concentration    HOSPITAL MEDICATIONS:  Standing Meds:  chlorhexidine 2% Cloths 1 Application(s) Topical <User Schedule>  pantoprazole    Tablet 40 milliGRAM(s) Oral before breakfast  predniSONE   Tablet 10 milliGRAM(s) Oral daily  Remodulin (Treprostinil) SQ infusion 1 Dose(s) 1 Dose(s) SubCutaneous Continuous Pump  rivaroxaban 20 milliGRAM(s) Oral daily  sildenafil (REVATIO) 20 milliGRAM(s) Oral <User Schedule>  spironolactone 25 milliGRAM(s) Oral <User Schedule>      PRN Meds:  ipratropium 17 MICROgram(s) HFA Inhaler 1 Puff(s) Inhalation every 6 hours PRN      LABS:    The Labs were reviewed by me   The Radiology was reviewed by me    EKG tracing reviewed by me    06-07    138  |  106  |  10  ----------------------------<  81  3.4<L>   |  20<L>  |  0.93    Ca    8.2<L>      07 Jun 2023 12:24  Mg     1.7     06-07    TPro  6.4  /  Alb  3.7  /  TBili  0.7  /  DBili  x   /  AST  10  /  ALT  10  /  AlkPhos  59  06-07    Magnesium, Serum: 1.7 mg/dL (06-07-23 @ 12:24)        PT/INR - ( 07 Jun 2023 12:24 )   PT: 17.0 sec;   INR: 1.46 ratio         PTT - ( 07 Jun 2023 12:24 )  PTT:41.2 sec                                        11.6   8.01  )-----------( 363      ( 07 Jun 2023 12:24 )             37.7     CAPILLARY BLOOD GLUCOSE        Blood Gas Source Venous: Venous (06-07-23 @ 12:25)      MICROBIOLOGY:       RADIOLOGY:  [x ] Reviewed and interpreted by me  < from: Xray Chest 1 View- PORTABLE-Urgent (06.07.23 @ 12:32) >  FINDINGS:  There is a left-sided pacemaker noted with leads overlying the right   atrium and right ventricle.  The heart is not enlarged.  There are enlarged michelle bilaterally consistent with enlarged pulmonary   arteries. Its not significantly changed from the prior study.  The lungs are clear.  There are no pleural effusions.  There is no pneumothorax.    IMPRESSION:    Enlarged pulmonary arteries bilaterally.  Clear lungs.    < end of copied text >      PULMONARY FUNCTION TESTS:    EKG:

## 2023-06-07 NOTE — CONSULT NOTE ADULT - PROBLEM SELECTOR RECOMMENDATION 4
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

## 2023-06-07 NOTE — CONSULT NOTE ADULT - PROBLEM SELECTOR RECOMMENDATION 9
Right heart failure with normal LVEF  - Increase Lasix to 40mg IVP BID in AM (6/8)  - Increase spironolactone 25 mg BID today  - Obtain repeat TTE   - Maintain Strict I/O's   - Daily standing weights  - Replete electrolytes to K>4.0 and Mg >2.5

## 2023-06-07 NOTE — PHYSICAL EXAM
[No Acute Distress] : no acute distress [Normal Oropharynx] : normal oropharynx [No Neck Mass] : no neck mass [No Abnormalities] : no abnormalities [Benign] : benign [Normal Gait] : normal gait [No Clubbing] : no clubbing [Normal Color/ Pigmentation] : normal color/ pigmentation [No Focal Deficits] : no focal deficits [Oriented x3] : oriented x3 [TextBox_54] : loud p2 [TextBox_68] : diminished at bases

## 2023-06-07 NOTE — H&P ADULT - NSHPPHYSICALEXAM_GEN_ALL_CORE
VITALS:   T(C): 36.4 (06-07-23 @ 11:49), Max: 36.6 (06-07-23 @ 11:30)  HR: 93 (06-07-23 @ 11:49) (90 - 93)  BP: 120/73 (06-07-23 @ 11:49) (119/87 - 120/73)  RR: 22 (06-07-23 @ 11:49) (20 - 22)  SpO2: 95% (06-07-23 @ 11:49) (95% - 95%)    GENERAL: NAD, lying in bed comfortably  HEAD:  Atraumatic, Normocephalic  EYES: EOMI, PERRLA, conjunctiva and sclera clear  ENT: Moist mucous membranes  NECK: Supple, No JVD  CHEST/LUNG: CTABL; No rales, rhonchi, wheezing, or rubs. Unlabored respirations  HEART: RRR. No M/R/G  ABDOMEN: Soft, nontender, non-distended, normoactive BS. No hepatomegaly; Rimodulin pump in place   EXTREMITIES:  2+ Peripheral Pulses, brisk capillary refill. No clubbing, cyanosis, or edema  NERVOUS SYSTEM:  Alert & Oriented X3, speech clear. No deficits, CN II-XII intact. Normal sensation   MSK: FROM all 4 extremities, full and equal strength  PSYCH: Normal affect, normal speech, normal behavior  SKIN: No rashes or lesions

## 2023-06-07 NOTE — ED PROVIDER NOTE - NSICDXPASTSURGICALHX_GEN_ALL_CORE_FT
PAST SURGICAL HISTORY:  H/O tubal ligation     History of pacemaker 12/19 - [employer] model Ingevity 4469    History of tubal ligation     Pacemaker

## 2023-06-07 NOTE — HISTORY OF PRESENT ILLNESS
[Never] : never [TextBox_4] : ------Patient is here for follow up of her pulmonary htn. ------------------INITIALLY  REFD  WITH   ECHO [DONE  OUTSIDE ] ELEVATED PASP  DILATED RV AND RA---------  HAS BEEN TOLD IN THE PAST SHE HAS ??? ASTHMA-----long-standing history of dyspnea on exertion-----------------has baseline tachycardia ----...--s. No history of liver dysfunction , collagen vascular disorder or chronic thromboembolic disease. I would classify her dyspnea as WHO  FUNCTIONAL CLASS III-----------no calf tenderness----------SLEEP STUDY SHOWS AHI 0.6 but T 90  < 35 %-----DIAGNOSED  AS PAH ---------WAS ON ADMAPAS  NOW ON   SQ REMODULIN  [ SINCE MAY 2018]  ---S/P PACEMAKER PLACEMENT AT [place]  AND ON METOPROLOL-----\par \par \par 2/3/2022 tested positive on home covid test 2/2/2022- developed shortness of breath, nausea and vomitting- son is positive\par She is vaccinated for covid but not boosted\par Afebrile, drinking but not eating well. O2 sat 94% room air rest- 92% room air w/walk\par \par 2/7/2022 covid pcr negative - did not get MAB\par still with SOUZA - on prednisone with taper\par \par 4/2022 6mwt o2 sat 95% to 94% on oxygen 69meters (135m) stopped d/t severe SOB and elevated HR\par \par 4/15/2022  pulm htn - on remodulin 36 ng (having jaw pain sometimes), sildenafil 10 mg qd, furosemide 20 mg 5x weekly which helped w/edema. She is also having palpitations- has not yet followed up with DR Chavis in cardiology.\par Asthma-occas wheezing- awaiting start of dupixent\par Up to date w/covid vac- declines influenza vac. s/p consult with lung transplant team but needs to lower BMI before being considered- pt has been referred to nutritionist but has not scheduled appt\par \par \par 5/31/22- pulm htn - on remodulin 36 ng SQ (having jaw pain sometimes), sildenafil 10 mg qd, and diuretics daily- doing well from pulm perspectives\par Asthma currently under control- declining biologic therapy\par Was following lung  transplant team but currently on hold d/t BMI\par \par \par 7/11/22- pulm htn--on remodulin 36 ng SQ (having jaw pain sometimes), sildenafil 10 mg qd and diuretics daily [LASIX WO MG---XARELTO \par was in the hospital at [facility]-- given Lasix --- we will slowly increase her dose of Remodulin\par \par 7/22/2022 pulm htn- breathing improved on higher dose of remodulin 40ng, having some diarrhea. Palpitations improved\par \par 9/15 /2022 ----s/p hospitalization for cardiogenic shock due to RV failure with markers of end organ dysfunction,\par started on  as well as Marcela. CTA negative for PE but TTE revealed RA thrombus\par with concern for clot in transit. She underwent catheter directed partial\par thrombus aspiration 8/31, course complicated by L fem pseudoaneurysm for which\par she received thrombin injection on 9/3. Course further complicated by S.\par Lugdunensis bacteremia on BCx from 8/29 s/p drainage of abscess at prior----on IV antibiotics as per ID\par Remodulin subcutaneous access site.--42 ng/kg/min , also on sildenafil 20 mg 3 times daily\par ----\par \par \par SEPT 27 2022-----feels much better on subcu Remodulin 44 ng/kg/min and sildenafil 20 mg 3 times daily on diuretics-on anticoagulation-------- needs to see vascular for left femoral pseudoaneurysm--- H/O---S.Lugdunensis bacteremia on BCx from 8/29 s/p drainage of abscess at prior----on IV antibiotics as per ID --------ALSO HAS Lleft   fem pseudoaneurysm---needs follow up by  vasular ---- --received flu vaccine today------------------------\par \par October 2022: Telehealth Visit via teledoc\par S/P hospitalization for cardiogenic shock c/b bacteremia on IV antibiotics. Followed by ID (Dr. Grover) for labs, antibiotics now d/c'd (10/6)  getting follow up labs w/ID (results pending from 10/19)\par Pulm htn -She reports use of Remodulin 44 ng/kg/min, tolerating new dose well  and Sildenafil (20 mg t.i.d PO) No resp complaints, no palpitations, follows cardiology Dr Paramjit Chavis. On diuretics, xarelto, O2 as needed\par s/p lung transplant eval- was told she was "too well" to be listed and needs to work on getting her BMI down\par \par jan 2022--developed abdominal wall abscess--needs surgical drainage--------subcu Remodulin 44 ng/kg/min and sildenafil 20 mg 3 times daily on diuretics-on anticoagulation--\par \par mario    10 2023--- recent admission to the hospital for abscess on abdominal wall which was drained and received antibiotics and it has healed----------------subcu Remodulin 44 ng/kg/min and sildenafil 20 mg 3 times daily on diuretics-on anticoagulation--I would like to increase Remodulin to 45 ng/kg/min--------- having some URI symptoms we will send a nasal swab continue diuretics----\par \par \par 3/2023---seems stable  pulm htn -remains on remodulin 44ng and sildenafil, diuretics per heart failure Dr Chavis\par c/o worsening SOUZA and nausea x 1week, chroic diarrhea which she attributes to remodulin, and lower abd pain ///??? pancreatitis\par Tacchycardia has improved, on continuous oxygen\par Accompanied by \par \par \par 5/2023 accompanied by \par pulm htn -remains on remodulin 44ng and sildenafil, diuretics per heart failure Dr Chavis\par old remodulin site healing w/minimal drainage (previously treated w/antibiotics)\par \par c/o nausea, lightheadedness, headache\par \par 6/2023 accompanied by -acute visit\par pulm htn -remains on remodulin 44ng and sildenafil\par Follow heart failure team of Dr Chavis- on lasix 40mg and aldactone, wt is up 5 lbs, no evidence of pedal edema\par Pt states "i think I am full of fluid"\par comes in c/o CP, palpitation, worsening dyspnea since Sunday\par nausea and diarrhea persist, currently menstruating\par recently completed antib for mrsa at old remodulin site on abd [TextBox_100] : 1/2014 [TextBox_108] : 0.9 [TextBox_112] : 35.3

## 2023-06-07 NOTE — ED ADULT NURSE REASSESSMENT NOTE - NSFALLRISKINTERV_ED_ALL_ED

## 2023-06-07 NOTE — ED ADULT NURSE NOTE - OBJECTIVE STATEMENT
42F bibems from MD office with c/o palpitations, Anemia     Asthma On home O2 nightly at 2L via NC    Asthma     CAD (coronary artery disease)     Corneal disorder     H/O pulmonary hypertension     History of tachycardia     On supplemental oxygen by nasal cannula O2 @ 2L    Pacemaker     PE (pulmonary thromboembolism) on Xarelto 10 mg daily    Pulmonary embolism     Pulmonary embolism     Pulmonary hypertension     Right heart failure     Sinusitis     Skin mass left upper thigh mass    Tachycardia 42F bibems from MD office with c/o palpitations when walking and worsening SOB on exertion, patient reports SOB started this past Sunday, went to her Pulmonologist office and was sent here via EMS for admission and management of her worsening SOB. Patient with h/o Anemia Asthma On home O2 nightly at 2L via NC CAD (coronary artery disease) Corneal disorder H/O pulmonary hypertension History of tachycardia Pacemaker PE (pulmonary thromboembolism) on Xarelto 10 mg daily Right heart failure Sinusitis Skin mass left upper thigh mass. Patient denies any chills or fever, pale looking.

## 2023-06-07 NOTE — CONSULT NOTE ADULT - NS ATTEND AMEND GEN_ALL_CORE FT
42/F with h/o Group 1 and IV PAH on SQ remodulin, sildenafil, RV dysfunction, RA thrombus, PPM for bradycardia, presents to ER with worsening SOB and Tachycardia.     JVP 13cms  no LE edema  b/l ae + fine crakles +  s1s2+ RRR    CXR pulm congestion  PRO 's    Imp:  ADHF Rv dysfunction  Lasix 40mg IV BID  strict i/o's  increase jacqueline 25mg po BID   rpt TTE    PAH: cont sub q remodulin, sildenafil  Pulm HTN following    HF will cont follow

## 2023-06-07 NOTE — ED PROVIDER NOTE - CLINICAL SUMMARY MEDICAL DECISION MAKING FREE TEXT BOX
Patient is a 42 year old female with past medical history significant for CHF, pulmonary hypertension, and asthma presents to the Emergency Department with chief complaint of dyspnea.  Patient evaluated for cardiac and pulmonary etiology of symptoms.  Patient received EKG, plain films, and labs.  Patient continued on 2L nasal cannula. Patient is a 42 year old female with past medical history significant for CHF, pulmonary hypertension, and asthma presents to the Emergency Department with chief complaint of dyspnea.  Patient evaluated for cardiac and pulmonary etiology of symptoms.  Patient received EKG, plain films, and labs.  Patient continued on 2L nasal cannula.  Labs at patient's baseline.  Plain films significant for signs of congestive heart failure.  Advanced heart failure team consulted.  Patient admitted to telemetry floors.

## 2023-06-07 NOTE — CONSULT NOTE ADULT - ATTENDING COMMENTS
Pt is a 42F with PMHx pulmHTN 2/2 PAH (WHO Group I, Class III) on treprostinil SQ with chronic hypoxemic respiratory failure (2L NC ATC) and hx tachycardia s/p PPM initially presenting to [facility] on 6/7/23 with worsening dyspnea and nausea from pulmonary office for IV diuresis and titration of treprostinil with hemodynamic monitoring.    Pt follows with Dr. Yoon at the pulmonary office, last seen 6/7/23 and recommended to come to ED for further care. Her pulmonary HTN is chronically managed with Sildenafil 20mg TID + Remodulin SQ 44ng/kg/mg + Prednisone 10mg QD as well as diuresis with spironolactone 25mg QD + furosemide 40mg QD (5x/week, increased from 20mg TIW a few months ago.)     -Recommend initiation of IV diuresis with furosemide for goal net (-) 1-2L as tolerated. Strict I/Os, BMPs with aggressive electrolyte repletion, and daily weights  -c/w Prednisone 10mg QD   -c/w home Sildenafil 20mg TID  -c/w home Remodulin SQ (will need to verify and add via pharmacy as pt is currently wearing SQ pump)  -Please space diuretics at least 1 hour apart from Sildenafil to avoid BP fluctuations  -Heart failure team (Dr. Chavis) consulted, will f/u recs  -Pulmonary team will continue to follow closely. Discussed plan of care with Dr. Yoon

## 2023-06-07 NOTE — CONSULT NOTE ADULT - PROBLEM SELECTOR RECOMMENDATION 2
Pulmonary HTN group I and IV)  - on continuous remodulin at 44 mcg/kg/min;  - remains on home O2 requirment @2L/min NC  - follow up with Dr. Yoon

## 2023-06-07 NOTE — ED ADULT NURSE REASSESSMENT NOTE - NS ED NURSE REASSESS COMMENT FT1
Patient remains stable while in the ED, admitted to telemetry for Heart failure and Pulmonary HTN awaiting bed availability. Continued on O2 at 3LNC. No pending stat orders or medications noted. Plan of care explained to patient and verbalized understanding.

## 2023-06-07 NOTE — CONSULT NOTE ADULT - PROBLEM SELECTOR RECOMMENDATION 3
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   - Currently on Xarelto 20 mg daily

## 2023-06-07 NOTE — CONSULT NOTE ADULT - SUBJECTIVE AND OBJECTIVE BOX
ADVANCED HEART FAILURE & TRANSPLANT  - PROGRESS NOTE  *To reach the NS1 Team from 8am to 5pm, please call [phone],   _______________________________________________________________________________________________________     Subjective:    Medications:  chlorhexidine 2% Cloths 1 Application(s) Topical <User Schedule>  ipratropium 17 MICROgram(s) HFA Inhaler 1 Puff(s) Inhalation every 6 hours PRN  pantoprazole    Tablet 40 milliGRAM(s) Oral before breakfast  predniSONE   Tablet 10 milliGRAM(s) Oral daily  Remodulin (Treprostinil) SQ infusion 1 Dose(s) 1 Dose(s) SubCutaneous Continuous Pump  rivaroxaban 20 milliGRAM(s) Oral daily  sildenafil (REVATIO) 20 milliGRAM(s) Oral <User Schedule>  spironolactone 25 milliGRAM(s) Oral <User Schedule>      Physical Exam:    Vitals:  Vital Signs Last 24 Hours  T(C): 36.4 (06-07-23 @ 11:49), Max: 36.6 (06-07-23 @ 11:30)  HR: 93 (06-07-23 @ 11:49) (90 - 93)  BP: 120/73 (06-07-23 @ 11:49) (119/87 - 120/73)  RR: 22 (06-07-23 @ 11:49) (20 - 22)  SpO2: 95% (06-07-23 @ 11:49) (95% - 95%)        I&O's Summary      Tele:    General: No distress. Comfortable.  HEENT: EOM intact.  Neck: Neck supple. JVP not elevated. No masses  Chest: Clear to auscultation bilaterally  CV: Normal S1 and S2. No murmurs, rub, or gallops. Radial pulses normal.  Abdomen: Soft, non-distended, non-tender  Skin: No rashes or skin breakdown  Extremities: No LE edema  Neurology: Alert and oriented times three. Sensation intact  Psych: Affect normal    Labs:                        11.6   8.01  )-----------( 363      ( 07 Jun 2023 12:24 )             37.7     06-07    138  |  106  |  10  ----------------------------<  81  3.4<L>   |  20<L>  |  0.93    Ca    8.2<L>      07 Jun 2023 12:24  Mg     1.7     06-07    TPro  6.4  /  Alb  3.7  /  TBili  0.7  /  DBili  x   /  AST  10  /  ALT  10  /  AlkPhos  59  06-07    PT/INR - ( 07 Jun 2023 12:24 )   PT: 17.0 sec;   INR: 1.46 ratio         PTT - ( 07 Jun 2023 12:24 )  PTT:41.2 sec               ADVANCED HEART FAILURE & TRANSPLANT  - PROGRESS NOTE  *To reach the NS1 Team from 8am to 5pm, please call [phone],   _______________________________________________________________________________________________________     HPI:  41 y/o F PMH Pulmonary Hypertension (On Rimodulin and Xarelto, on home 2L O2), RA thrombus s/p thrombectomy, multiple abdominal wall abscesses (last 5/18 s/p I&D), presenting with increased dyspnea on exertion. The patient reports for the past week she's been experiencing increased shortness of breath with minimal exertion, even having SOB going to the bathroom. Her cardiologist Dr Chavis, and her pulmonologist Dr. Yoon have been trying to increase her Rimodulin however this has been limited due to her tolerance of increases. They held off over the past week because the patient was having abdominal pain and nausea/vomiting due to gastroenteritis. Today the patient reported increasing back pain, which usually happens when she has fluid retention. The patient called Dr. Yoon who recommended the patient go to the ED for further evaluation. The patient denies any fevers, chills, nausea, vomiting at this time. She only reports increased dyspnea on exertion.     In the ED, pt symptoms improved w/ increased O2 administration via NC. Heart failure team was called, and patient admitted for further management.  (07 Jun 2023 14:12)      PAST MEDICAL & SURGICAL HISTORY:  Tachycardia  Anemia  Pulmonary hypertension  Pulmonary embolism  Asthma  On home O2 nightly at 2L via NC  PE (pulmonary thromboembolism)  on Xarelto 10 mg daily  Sinusitis  Corneal disorder  Right heart failure  CAD (coronary artery disease)  H/O pulmonary hypertension  Pulmonary embolism  Asthma  History of tachycardia  On supplemental oxygen by nasal cannula  O2 @ 2L  Pacemaker  Skin mass  left upper thigh mass  History of tubal ligation  Pacemaker  H/O tubal ligation  History of pacemaker  12/19 - [employer] model Ingevity 4439      FAMILY HISTORY:  Family history of diabetes mellitus  in brother  Family history of diabetes mellitus in mother  FH: anemia    Home Medications:  Atrovent 500 mcg/2.5 mL inhalation solution: 2.5 milliliter(s) inhaled every 6 hours, As Needed (07 Jun 2023 14:02)  Atrovent HFA 17 mcg/inh inhalation aerosol: puff(s) inhaled every 6 hours, As Needed (07 Jun 2023 14:09)  furosemide 20 mg oral tablet: 2 tab(s) orally every other day (07 Jun 2023 14:04)  omeprazole 40 mg oral delayed release capsule: 1 cap(s) orally once a day (07 Jun 2023 14:09)  Remodulin 5 mg/mL injectable solution: patient is taking 44ng/kg/min via her own SQ PUMP (07 Jun 2023 14:02)    Medications:  chlorhexidine 2% Cloths 1 Application(s) Topical <User Schedule>  pantoprazole    Tablet 40 milliGRAM(s) Oral before breakfast  predniSONE   Tablet 10 milliGRAM(s) Oral daily  Remodulin (Treprostinil) SQ infusion 1 Dose(s) 1 Dose(s) SubCutaneous Continuous Pump  rivaroxaban 20 milliGRAM(s) Oral daily  sildenafil (REVATIO) 20 milliGRAM(s) Oral <User Schedule>  spironolactone 25 milliGRAM(s) Oral <User Schedule>  tiotropium 2.5 MICROgram(s) Inhaler 2 Puff(s) Inhalation daily      Review of systems:  10 point review of systems completed and are negative unless noted in HPI    Vitals:  T(C): 36.4 (06-07-23 @ 11:49), Max: 36.6 (06-07-23 @ 11:30)  HR: 98 (06-07-23 @ 15:28) (90 - 98)  BP: 109/76 (06-07-23 @ 15:28) (109/76 - 120/73)  BP(mean): 84 (06-07-23 @ 15:28) (84 - 84)  RR: 22 (06-07-23 @ 15:28) (20 - 22)  SpO2: 97% (06-07-23 @ 15:28) (95% - 97%)    Daily Height in cm: 167.64 (07 Jun 2023 11:30)    Daily     Weight (kg): 100.7 (06-07 @ 11:30)    I&O's Summary      Physical Exam:  Appearance: No Acute Distress  HEENT: PERRL  Neck:   Cardiovascular: Normal S1 S2, No murmurs/rubs/gallops  Respiratory: Clear to auscultation bilaterally  Gastrointestinal: Soft, Non-tender	  Skin: No cyanosis	  Neurologic: Non-focal  Extremities: No LE edema  Psychiatry: A & O x 3, Mood & affect appropriate    Labs:                        11.6   8.01  )-----------( 363      ( 07 Jun 2023 12:24 )             37.7     06-07    138  |  106  |  10  ----------------------------<  81  3.4<L>   |  20<L>  |  0.93    Ca    8.2<L>      07 Jun 2023 12:24  Mg     1.7     06-07    TPro  6.4  /  Alb  3.7  /  TBili  0.7  /  DBili  x   /  AST  10  /  ALT  10  /  AlkPhos  59  06-07    PT/INR - ( 07 Jun 2023 12:24 )   PT: 17.0 sec;   INR: 1.46 ratio    PTT - ( 07 Jun 2023 12:24 )  PTT:41.2 sec      TELEMETRY:

## 2023-06-07 NOTE — ED PROVIDER NOTE - ATTENDING CONTRIBUTION TO CARE
MD Marshall:  patient seen and evaluated with the EM Fellow. I was present for key portions of the History & Physical, and I agree with the Impression & Plan.    Patient is a 42-year-old female complaining of 2 weeks progressive worsening dyspnea with associated bilateral hand tightness swelling.  Patient has a medical history of pulmonary hypertension with CHF.  Recent treated 2 months ago for abdominal wall abscess, treated with antibiotics; completely resolved.    Patient's pulmonologist is Dr. Yoon; cardiologist is Dr. Chavis  Patient endorses that she has been needing 2 L nasal cannula at home for comfort    Vital signs: Within normal limits; 95% on room air  General: No respiratory distress  Neck: No JVD  Lungs: Clear to auscultation bilaterally.  Cardiac: Regular rate and rhythm no rubs murmurs or gallops  +1 bipedal edema (patient endorses that she does not typically develop edema when she is having an exacerbation; just shortness of breath).    Medical decision making:  History and physical concerning for worsening pulmonary hypertension versus CHF exacerbation; possibly both.  Patient will be worked up with basic labs proBNP.  Anticipate admission to hospital for echo telemetry bedside cardiology and pulmonology evaluations    ECG directly visualized by me and shows normal sinus rhythm nonspecific ST abnormalities throughout, Which are new compared to old ECGs; patient will need ACS work-up with troponin proBNP

## 2023-06-07 NOTE — H&P ADULT - ASSESSMENT
41 y/o F PMH Pulmonary Hypertension (On Rimodulin and Xarelto, on home 2L O2), RA thrombus s/p thrombectomy, multiple abdominal wall abscesses (last 5/18 s/p I&D), presenting with increased dyspnea on exertion.

## 2023-06-07 NOTE — CONSULT NOTE ADULT - ASSESSMENT
42 y.o. female with PAH, hx of PE, PPM admitted for worsening dyspnea due to possible fluid overload     Plan   - TTE to assess possible worsening of her RV function   - Start Lasix 40mg IV daily   - Strict I/Os   - Daily weights   - Continue home dose of Remodulin SQ 44 ng/kg/min   - continue sildenafil 20mg q8h   - continue spirolactone 25mg once a day. Make sure to space the sildenafil and spirolactone dosing by 1 hour   - consult heart failure team   - Will discuss with Dr. Yoon about increasing Remodulin dose while inpatient   - Goal SaO2 of >92%  - PT/OT  - OOB to chair     Cain Burks MD   Pulmonary/Critical Care Fellow PGY-7  [facility] Pager #: [phone]  Spectra #: [id number]  
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 She is volume up and warm on exam. Labs notable for rise in ProBNP to 700 (was previously normal on recent outpt labs), otherwise normal renal and hepatic chemistries.      Cardiac Studies  8/2022 TTE revealed RA thrombus with concern for clot in transit. She underwent catheter directed partial  thrombus aspiration 8/31  Limited TTE 8/31 @10:30: LVIDd 3.2 cm, hyperdynamic LV, new echogenic mass in RA 1.8 cm x 3.1 cm likely representing clot in transit (not seen on TTE from 8/29), RVE with decreased RV systolic function, est PASP 60 mmHg  9/1/22 Cath for venous thrombectomy right arm   RHC 7/23/20 RA 4, RV 80/4, PA 80/40/57, PCWP 22, LVEDP 4; CO/CI 4/2.0; PVR 11 (using EDP 13).

## 2023-06-07 NOTE — ED PROVIDER NOTE - NS ED ROS FT
CONSTITUTIONAL: No weakness, fevers or chills  EYES/ENT: No visual changes;  No vertigo or throat pain   NECK: No pain or stiffness  RESPIRATORY: + dyspnea  CARDIOVASCULAR: + chest pain  GASTROINTESTINAL: No abdominal or epigastric pain. No nausea, vomiting, or hematemesis; No diarrhea or constipation. No melena or hematochezia.  GENITOURINARY: No dysuria, frequency or hematuria  NEUROLOGICAL: No numbness or weakness  SKIN: No itching, burning, rashes, or lesions   All other review of systems is negative unless indicated above.

## 2023-06-07 NOTE — ED ADULT NURSE NOTE - NSFALLUNIVINTERV_ED_ALL_ED
Bed/Stretcher in lowest position, wheels locked, appropriate side rails in place/Call bell, personal items and telephone in reach/Instruct patient to call for assistance before getting out of bed/chair/stretcher/Non-slip footwear applied when patient is off stretcher/[place] to call system/Physically safe environment - no spills, clutter or unnecessary equipment/Purposeful proactive rounding/Room/bathroom lighting operational, light cord in reach

## 2023-06-07 NOTE — ED ADULT NURSE NOTE - PAIN: PRESENCE, MLM
Patient is scheduled for an office visit with Ilda Goodrich on 11/22/21 for AC management and IVC filter.   denies pain/discomfort (Rating = 0)

## 2023-06-07 NOTE — H&P ADULT - NSHPREVIEWOFSYSTEMS_GEN_ALL_CORE
REVIEW OF SYSTEMS:    CONSTITUTIONAL: No weakness, fevers or chills  EYES: No vision changes  ENT: No vertigo or throat pain   NECK: No pain or stiffness  RESPIRATORY: No cough, wheezing, hemoptysis; +Shortness of breath  CARDIOVASCULAR: No chest pain or palpitations  GASTROINTESTINAL: No abdominal or epigastric pain. No nausea, vomiting, or hematemesis; No diarrhea or constipation. No melena or hematochezia.  GENITOURINARY: No dysuria, frequency or hematuria  NEUROLOGICAL: No numbness or weakness  PSYCH: No anxiety, depression, or behavior changes  All other review of systems is negative unless indicated above.

## 2023-06-07 NOTE — H&P ADULT - NSHPLABSRESULTS_GEN_ALL_CORE
11.6   8.01  )-----------( 363      ( 07 Jun 2023 12:24 )             37.7     06-07    138  |  106  |  10  ----------------------------<  81  3.4<L>   |  20<L>  |  0.93    Ca    8.2<L>      07 Jun 2023 12:24  Mg     1.7     06-07    TPro  6.4  /  Alb  3.7  /  TBili  0.7  /  DBili  x   /  AST  10  /  ALT  10  /  AlkPhos  59  06-07          LIVER FUNCTIONS - ( 07 Jun 2023 12:24 )  Alb: 3.7 g/dL / Pro: 6.4 g/dL / ALK PHOS: 59 U/L / ALT: 10 U/L / AST: 10 U/L / GGT: x             PT/INR - ( 07 Jun 2023 12:24 )   PT: 17.0 sec;   INR: 1.46 ratio         PTT - ( 07 Jun 2023 12:24 )  PTT:41.2 sec    IMAGING:    < from: Xray Chest 1 View- PORTABLE-Urgent (06.07.23 @ 12:32) >    FINDINGS:  There is a left-sided pacemaker noted with leads overlying the right   atrium and right ventricle.  The heart is not enlarged.  There are enlarged michelle bilaterally consistent with enlarged pulmonary   arteries. Its not significantly changed from the prior study.  The lungs are clear.  There are no pleural effusions.  There is no pneumothorax.    IMPRESSION:    Enlarged pulmonary arteries bilaterally.  Clear lungs.    < end of copied text >

## 2023-06-07 NOTE — H&P ADULT - NSICDXPASTSURGICALHX_GEN_ALL_CORE_FT
PAST SURGICAL HISTORY:  H/O tubal ligation     History of pacemaker 12/19 - [employer] model Ingevity 4469    History of tubal ligation     Pacemaker

## 2023-06-08 LAB
ALBUMIN SERPL ELPH-MCNC: 3.8 G/DL — SIGNIFICANT CHANGE UP (ref 3.3–5)
ALP SERPL-CCNC: 61 U/L — SIGNIFICANT CHANGE UP (ref 40–120)
ALT FLD-CCNC: 10 U/L — SIGNIFICANT CHANGE UP (ref 10–45)
ANION GAP SERPL CALC-SCNC: 13 MMOL/L — SIGNIFICANT CHANGE UP (ref 5–17)
AST SERPL-CCNC: 13 U/L — SIGNIFICANT CHANGE UP (ref 10–40)
BILIRUB SERPL-MCNC: 1 MG/DL — SIGNIFICANT CHANGE UP (ref 0.2–1.2)
BUN SERPL-MCNC: 8 MG/DL — SIGNIFICANT CHANGE UP (ref 7–23)
CALCIUM SERPL-MCNC: 8.3 MG/DL — LOW (ref 8.4–10.5)
CHLORIDE SERPL-SCNC: 105 MMOL/L — SIGNIFICANT CHANGE UP (ref 96–108)
CO2 SERPL-SCNC: 20 MMOL/L — LOW (ref 22–31)
CREAT SERPL-MCNC: 0.89 MG/DL — SIGNIFICANT CHANGE UP (ref 0.5–1.3)
EGFR: 83 ML/MIN/1.73M2 — SIGNIFICANT CHANGE UP
GLUCOSE SERPL-MCNC: 90 MG/DL — SIGNIFICANT CHANGE UP (ref 70–99)
HCT VFR BLD CALC: 41.6 % — SIGNIFICANT CHANGE UP (ref 34.5–45)
HGB BLD-MCNC: 12.3 G/DL — SIGNIFICANT CHANGE UP (ref 11.5–15.5)
MAGNESIUM SERPL-MCNC: 1.6 MG/DL — SIGNIFICANT CHANGE UP (ref 1.6–2.6)
MCHC RBC-ENTMCNC: 20.1 PG — LOW (ref 27–34)
MCHC RBC-ENTMCNC: 29.6 GM/DL — LOW (ref 32–36)
MCV RBC AUTO: 68.1 FL — LOW (ref 80–100)
NRBC # BLD: 0 /100 WBCS — SIGNIFICANT CHANGE UP (ref 0–0)
PHOSPHATE SERPL-MCNC: 3.3 MG/DL — SIGNIFICANT CHANGE UP (ref 2.5–4.5)
PLATELET # BLD AUTO: 389 K/UL — SIGNIFICANT CHANGE UP (ref 150–400)
POTASSIUM SERPL-MCNC: 3.2 MMOL/L — LOW (ref 3.5–5.3)
POTASSIUM SERPL-SCNC: 3.2 MMOL/L — LOW (ref 3.5–5.3)
PROT SERPL-MCNC: 6.6 G/DL — SIGNIFICANT CHANGE UP (ref 6–8.3)
RBC # BLD: 6.11 M/UL — HIGH (ref 3.8–5.2)
RBC # FLD: 21.2 % — HIGH (ref 10.3–14.5)
SODIUM SERPL-SCNC: 138 MMOL/L — SIGNIFICANT CHANGE UP (ref 135–145)
WBC # BLD: 9.62 K/UL — SIGNIFICANT CHANGE UP (ref 3.8–10.5)
WBC # FLD AUTO: 9.62 K/UL — SIGNIFICANT CHANGE UP (ref 3.8–10.5)

## 2023-06-08 PROCEDURE — 99233 SBSQ HOSP IP/OBS HIGH 50: CPT | Mod: GC

## 2023-06-08 PROCEDURE — 93306 TTE W/DOPPLER COMPLETE: CPT | Mod: 26

## 2023-06-08 RX ORDER — ACETAMINOPHEN 500 MG
650 TABLET ORAL EVERY 6 HOURS
Refills: 0 | Status: DISCONTINUED | OUTPATIENT
Start: 2023-06-08 | End: 2023-06-12

## 2023-06-08 RX ORDER — POTASSIUM CHLORIDE 20 MEQ
40 PACKET (EA) ORAL ONCE
Refills: 0 | Status: COMPLETED | OUTPATIENT
Start: 2023-06-08 | End: 2023-06-08

## 2023-06-08 RX ORDER — DIPHENHYDRAMINE HCL 50 MG
25 CAPSULE ORAL EVERY 6 HOURS
Refills: 0 | Status: DISCONTINUED | OUTPATIENT
Start: 2023-06-08 | End: 2023-06-09

## 2023-06-08 RX ADMIN — Medication 500 MICROGRAM(S): at 21:35

## 2023-06-08 RX ADMIN — PANTOPRAZOLE SODIUM 40 MILLIGRAM(S): 20 TABLET, DELAYED RELEASE ORAL at 05:32

## 2023-06-08 RX ADMIN — Medication 500 MICROGRAM(S): at 18:40

## 2023-06-08 RX ADMIN — Medication 10 MILLIGRAM(S): at 05:32

## 2023-06-08 RX ADMIN — Medication 20 MILLIGRAM(S): at 21:35

## 2023-06-08 RX ADMIN — Medication 25 MILLIGRAM(S): at 19:01

## 2023-06-08 RX ADMIN — SPIRONOLACTONE 25 MILLIGRAM(S): 25 TABLET, FILM COATED ORAL at 18:41

## 2023-06-08 RX ADMIN — Medication 500 MICROGRAM(S): at 09:19

## 2023-06-08 RX ADMIN — Medication 500 MICROGRAM(S): at 03:49

## 2023-06-08 RX ADMIN — Medication 40 MILLIGRAM(S): at 09:19

## 2023-06-08 RX ADMIN — CHLORHEXIDINE GLUCONATE 1 APPLICATION(S): 213 SOLUTION TOPICAL at 08:21

## 2023-06-08 RX ADMIN — SPIRONOLACTONE 25 MILLIGRAM(S): 25 TABLET, FILM COATED ORAL at 09:17

## 2023-06-08 RX ADMIN — Medication 40 MILLIEQUIVALENT(S): at 09:17

## 2023-06-08 RX ADMIN — Medication 20 MILLIGRAM(S): at 07:34

## 2023-06-08 RX ADMIN — RIVAROXABAN 20 MILLIGRAM(S): KIT at 12:42

## 2023-06-08 RX ADMIN — Medication 20 MILLIGRAM(S): at 14:53

## 2023-06-08 RX ADMIN — Medication 40 MILLIGRAM(S): at 16:55

## 2023-06-08 NOTE — PROGRESS NOTE ADULT - SUBJECTIVE AND OBJECTIVE BOX
CHIEF COMPLAINT:Patient is a 42y old  Female who presents with a chief complaint of Dyspnea on Exertion (07 Jun 2023 14:40)      Interval Events: No acute events overnight     REVIEW OF SYSTEMS:  [x] All other systems negative except per HPI   [ ] Unable to assess ROS because ________    OBJECTIVE:  ICU Vital Signs Last 24 Hrs  T(C): 36.3 (08 Jun 2023 03:55), Max: 36.9 (07 Jun 2023 20:57)  T(F): 97.4 (08 Jun 2023 03:55), Max: 98.4 (07 Jun 2023 20:57)  HR: 104 (08 Jun 2023 03:55) (90 - 104)  BP: 119/79 (08 Jun 2023 03:55) (102/77 - 120/73)  BP(mean): 84 (07 Jun 2023 15:28) (84 - 84)  ABP: --  ABP(mean): --  RR: 18 (08 Jun 2023 03:55) (18 - 22)  SpO2: 94% (08 Jun 2023 03:55) (94% - 98%)    O2 Parameters below as of 08 Jun 2023 03:55  Patient On (Oxygen Delivery Method): nasal cannula  O2 Flow (L/min): 2              PHYSICAL EXAM:  GENERAL: NAD, well-groomed, well-developed  EYES: EOMI, PERRLA, conjunctiva and sclera clear  ENMT: No tonsillar erythema, exudates, or enlargement; Moist mucous membranes, Good dentition, No lesions  CHEST/LUNG: Clear to auscultation bilaterally; No rales, rhonchi, wheezing, or rubs  HEART: Regular rate and rhythm; No murmurs, rubs, or gallops  ABDOMEN: Soft, Nontender, Nondistended; Bowel sounds present  VASCULAR:  2+ Peripheral Pulses, No clubbing, cyanosis, or edema  LYMPH: No lymphadenopathy noted  SKIN: No rashes or lesions  NERVOUS SYSTEM:  Alert & Oriented X3, Good concentration    HOSPITAL MEDICATIONS:  MEDICATIONS  (STANDING):  chlorhexidine 2% Cloths 1 Application(s) Topical <User Schedule>  furosemide   Injectable 40 milliGRAM(s) IV Push <User Schedule>  ipratropium    for Nebulization 500 MICROGram(s) Nebulizer every 6 hours  pantoprazole    Tablet 40 milliGRAM(s) Oral before breakfast  predniSONE   Tablet 10 milliGRAM(s) Oral daily  Remodulin (Treprostinil) SQ infusion 1 Dose(s) 1 Dose(s) SubCutaneous Continuous Pump  rivaroxaban 20 milliGRAM(s) Oral daily  sildenafil (REVATIO) 20 milliGRAM(s) Oral <User Schedule>  spironolactone 25 milliGRAM(s) Oral <User Schedule>    MEDICATIONS  (PRN):  ipratropium 17 MICROgram(s) HFA Inhaler 1 Puff(s) Inhalation every 6 hours PRN Shortness of Breath/Wheezing      LABS:    The Labs were reviewed by me   The Radiology was reviewed by me    EKG tracing reviewed by me    06-08    138  |  105  |  8   ----------------------------<  90  3.2<L>   |  20<L>  |  0.89  06-07    138  |  106  |  10  ----------------------------<  81  3.4<L>   |  20<L>  |  0.93    Ca    8.3<L>      08 Jun 2023 05:34  Ca    8.2<L>      07 Jun 2023 12:24  Phos  3.3     06-08  Mg     1.6     06-08    TPro  6.6  /  Alb  3.8  /  TBili  1.0  /  DBili  x   /  AST  13  /  ALT  10  /  AlkPhos  61  06-08  TPro  6.4  /  Alb  3.7  /  TBili  0.7  /  DBili  x   /  AST  10  /  ALT  10  /  AlkPhos  59  06-07    Magnesium, Serum: 1.6 mg/dL (06-08-23 @ 05:34)  Magnesium, Serum: 1.7 mg/dL (06-07-23 @ 12:24)    Phosphorus Level, Serum: 3.3 mg/dL (06-08-23 @ 05:34)      PT/INR - ( 07 Jun 2023 12:24 )   PT: 17.0 sec;   INR: 1.46 ratio         PTT - ( 07 Jun 2023 12:24 )  PTT:41.2 sec                                        12.3   9.62  )-----------( 389      ( 08 Jun 2023 05:35 )             41.6                         11.6   8.01  )-----------( 363      ( 07 Jun 2023 12:24 )             37.7     CAPILLARY BLOOD GLUCOSE        Blood Gas Source Venous: Venous (06-07-23 @ 12:25)      MICROBIOLOGY:     RADIOLOGY:  [ ] Reviewed and interpreted by me    Point of Care Ultrasound Findings:    PFT:    EKG: CHIEF COMPLAINT: Patient is a 42y old  Female who presents with a chief complaint of Dyspnea on Exertion (07 Jun 2023 14:40)      Interval Events: No acute events overnight     REVIEW OF SYSTEMS:  [x] All other systems negative except per HPI   [ ] Unable to assess ROS because ________    OBJECTIVE:  ICU Vital Signs Last 24 Hrs  T(C): 36.3 (08 Jun 2023 03:55), Max: 36.9 (07 Jun 2023 20:57)  T(F): 97.4 (08 Jun 2023 03:55), Max: 98.4 (07 Jun 2023 20:57)  HR: 104 (08 Jun 2023 03:55) (90 - 104)  BP: 119/79 (08 Jun 2023 03:55) (102/77 - 120/73)  BP(mean): 84 (07 Jun 2023 15:28) (84 - 84)  ABP: --  ABP(mean): --  RR: 18 (08 Jun 2023 03:55) (18 - 22)  SpO2: 94% (08 Jun 2023 03:55) (94% - 98%)    O2 Parameters below as of 08 Jun 2023 03:55  Patient On (Oxygen Delivery Method): nasal cannula  O2 Flow (L/min): 2              PHYSICAL EXAM:  GENERAL: NAD, well-groomed, well-developed  EYES: EOMI, PERRLA, conjunctiva and sclera clear  ENMT: No tonsillar erythema, exudates, or enlargement; Moist mucous membranes, Good dentition, No lesions  CHEST/LUNG: Clear to auscultation bilaterally; No rales, rhonchi, wheezing, or rubs  HEART: Regular rate and rhythm; No murmurs, rubs, or gallops  ABDOMEN: Soft, Nontender, Nondistended; Bowel sounds present  VASCULAR:  2+ Peripheral Pulses, No clubbing, cyanosis, or edema  LYMPH: No lymphadenopathy noted  SKIN: No rashes or lesions  NERVOUS SYSTEM:  Alert & Oriented X3, Good concentration    HOSPITAL MEDICATIONS:  MEDICATIONS  (STANDING):  chlorhexidine 2% Cloths 1 Application(s) Topical <User Schedule>  furosemide   Injectable 40 milliGRAM(s) IV Push <User Schedule>  ipratropium    for Nebulization 500 MICROGram(s) Nebulizer every 6 hours  pantoprazole    Tablet 40 milliGRAM(s) Oral before breakfast  predniSONE   Tablet 10 milliGRAM(s) Oral daily  Remodulin (Treprostinil) SQ infusion 1 Dose(s) 1 Dose(s) SubCutaneous Continuous Pump  rivaroxaban 20 milliGRAM(s) Oral daily  sildenafil (REVATIO) 20 milliGRAM(s) Oral <User Schedule>  spironolactone 25 milliGRAM(s) Oral <User Schedule>    MEDICATIONS  (PRN):  ipratropium 17 MICROgram(s) HFA Inhaler 1 Puff(s) Inhalation every 6 hours PRN Shortness of Breath/Wheezing      LABS:    The Labs were reviewed by me   The Radiology was reviewed by me    EKG tracing reviewed by me    06-08    138  |  105  |  8   ----------------------------<  90  3.2<L>   |  20<L>  |  0.89  06-07    138  |  106  |  10  ----------------------------<  81  3.4<L>   |  20<L>  |  0.93    Ca    8.3<L>      08 Jun 2023 05:34  Ca    8.2<L>      07 Jun 2023 12:24  Phos  3.3     06-08  Mg     1.6     06-08    TPro  6.6  /  Alb  3.8  /  TBili  1.0  /  DBili  x   /  AST  13  /  ALT  10  /  AlkPhos  61  06-08  TPro  6.4  /  Alb  3.7  /  TBili  0.7  /  DBili  x   /  AST  10  /  ALT  10  /  AlkPhos  59  06-07    Magnesium, Serum: 1.6 mg/dL (06-08-23 @ 05:34)  Magnesium, Serum: 1.7 mg/dL (06-07-23 @ 12:24)    Phosphorus Level, Serum: 3.3 mg/dL (06-08-23 @ 05:34)      PT/INR - ( 07 Jun 2023 12:24 )   PT: 17.0 sec;   INR: 1.46 ratio         PTT - ( 07 Jun 2023 12:24 )  PTT:41.2 sec                                        12.3   9.62  )-----------( 389      ( 08 Jun 2023 05:35 )             41.6                         11.6   8.01  )-----------( 363      ( 07 Jun 2023 12:24 )             37.7     CAPILLARY BLOOD GLUCOSE        Blood Gas Source Venous: Venous (06-07-23 @ 12:25)      MICROBIOLOGY:     RADIOLOGY:  [ ] Reviewed and interpreted by me    Point of Care Ultrasound Findings:    PFT:    EKG:

## 2023-06-08 NOTE — PROVIDER CONTACT NOTE (OTHER) - SITUATION
Patient complaint of feeling itchy around tele leads and asked if ok to get benadryl
AV fistula site noted saturated with blood

## 2023-06-08 NOTE — PROGRESS NOTE ADULT - ASSESSMENT
41 y/o F PMH Pulmonary Hypertension (On Rimodulin and Xarelto, on home 2L O2), RA thrombus s/p thrombectomy, multiple abdominal wall abscesses (last 5/18 s/p I&D), presenting with increased dyspnea on exertion. 41 y/o F PMH Pulmonary Hypertension (On Rimodulin and Xarelto, on home 2L O2), RA thrombus s/p thrombectomy, multiple abdominal wall abscesses (last 5/18 s/p I&D), presenting with AHRF from pulmonary hypertension vs CHF exacerbation.

## 2023-06-08 NOTE — PROGRESS NOTE ADULT - ASSESSMENT
42 y.o. female with pulmonary arterial hypertension, hx of PE, PPM admitted for worsening dyspnea due to possible fluid overload     Plan   - TTE to assess possible worsening of her RV function   - Can start Lasix 40mg IV BID as per HF team   - Strict I/Os   - Daily weights   - Continue home dose of Remodulin SQ 44 ng/kg/min   - continue sildenafil 20mg q8h   - continue spirolactone 25mg once a day  - continue Xarelto 20mg once a day   - Appreciate heart failure team recommendations   - Will discuss with Dr. Yoon about increasing Remodulin dose while inpatient   - Goal SaO2 of >92%  - PT/OT  - OOB to chair     Cain Burks MD   Pulmonary/Critical Care Fellow PGY-7  [facility] Pager #: [phone]  Spectra #: [phone]

## 2023-06-08 NOTE — PROGRESS NOTE ADULT - ATTENDING COMMENTS
Pt is a 42F with PMHx asthma (moderate-severe persistent), pulmHTN 2/2 PAH (WHO Group I, Class III) on treprostinil SQ with chronic hypoxemic respiratory failure (2L NC ATC) and hx tachycardia s/p PPM initially presenting to [facility] on 6/7/23 with worsening dyspnea and nausea from pulmonary office for IV diuresis and titration of treprostinil with hemodynamic monitoring.     Pt follows with Dr. Yoon at the pulmonary office, last seen 6/7/23 and recommended to come to ED for further care. Her pulmonary HTN is chronically managed with Sildenafil 20mg TID + Remodulin SQ 44ng/kg/mg + Prednisone 10mg QD (for asthma mgmt) as well as diuresis with spironolactone 25mg QD + furosemide 40mg QD (5x/week, increased from 20mg TIW a few months ago.) Pt initiated on IV diuresis on admission with net (-) ~1L documented, pt reports clinical improvement in sx as well.     -Recommend continuation of IV diuresis with furosemide for goal net (-) 1-2L as tolerated. Please continue with strict I/Os, BMPs with aggressive electrolyte repletion, and daily weights  -c/w Prednisone 10mg QD (for asthma control), on slow taper as outpatient. Will need to re-address immunotherapy options after discharge   -Please add Budesonide nebulizer BID for added asthma chronic maintenance. Change ipratropium prn to xopenex for prn   -c/w home Sildenafil 20mg TID  -c/w home Remodulin SQ (will need to verify and add via pharmacy as pt is currently wearing SQ pump)  -Please space diuretics at least 1 hour apart from Sildenafil to avoid BP fluctuations  -Heart failure team (Dr. Chavis) consulted, will f/u recs  -Pulmonary team will continue to follow closely. Discussed plan of care with Dr. Yoon

## 2023-06-08 NOTE — PROGRESS NOTE ADULT - SUBJECTIVE AND OBJECTIVE BOX
Sherman Lafleur MD  Internal Medicine, PGY-2  Please Contact via TEAMS    SUBJECTIVE / OVERNIGHT EVENTS:  - Pt seen and examined at bedside  - FIDENCIO    MEDICATIONS  (STANDING):  chlorhexidine 2% Cloths 1 Application(s) Topical <User Schedule>  furosemide   Injectable 40 milliGRAM(s) IV Push <User Schedule>  ipratropium    for Nebulization 500 MICROGram(s) Nebulizer every 6 hours  pantoprazole    Tablet 40 milliGRAM(s) Oral before breakfast  potassium chloride    Tablet ER 40 milliEquivalent(s) Oral once  predniSONE   Tablet 10 milliGRAM(s) Oral daily  Remodulin (Treprostinil) SQ infusion 1 Dose(s) 1 Dose(s) SubCutaneous Continuous Pump  rivaroxaban 20 milliGRAM(s) Oral daily  sildenafil (REVATIO) 20 milliGRAM(s) Oral <User Schedule>  spironolactone 25 milliGRAM(s) Oral <User Schedule>    MEDICATIONS  (PRN):  ipratropium 17 MICROgram(s) HFA Inhaler 1 Puff(s) Inhalation every 6 hours PRN Shortness of Breath/Wheezing          PHYSICAL EXAM:  Vital Signs Last 24 Hrs  T(C): 36.3 (08 Jun 2023 03:55), Max: 36.9 (07 Jun 2023 20:57)  T(F): 97.4 (08 Jun 2023 03:55), Max: 98.4 (07 Jun 2023 20:57)  HR: 104 (08 Jun 2023 03:55) (90 - 104)  BP: 119/79 (08 Jun 2023 03:55) (102/77 - 120/73)  BP(mean): 84 (07 Jun 2023 15:28) (84 - 84)  RR: 18 (08 Jun 2023 03:55) (18 - 22)  SpO2: 94% (08 Jun 2023 03:55) (94% - 98%)    Parameters below as of 08 Jun 2023 03:55  Patient On (Oxygen Delivery Method): nasal cannula  O2 Flow (L/min): 2      CAPILLARY BLOOD GLUCOSE        I&O's Summary      CONSTITUTIONAL: NAD, well-developed  RESPIRATORY: Normal respiratory effort; lungs are clear to auscultation bilaterally  CARDIOVASCULAR: Regular rate and rhythm, normal S1 and S2, no murmur/rub/gallop; No lower extremity edema; Peripheral pulses are 2+ bilaterally  ABDOMEN: Nontender to palpation, normoactive bowel sounds, no rebound/guarding; No hepatosplenomegaly  MUSCLOSKELETAL: no clubbing or cyanosis of digits; no joint swelling or tenderness to palpation  PSYCH: A+O to person, place, and time; affect appropriate    LABS:                        12.3   9.62  )-----------( 389      ( 08 Jun 2023 05:35 )             41.6     06-08    138  |  105  |  8   ----------------------------<  90  3.2<L>   |  20<L>  |  0.89    Ca    8.3<L>      08 Jun 2023 05:34  Phos  3.3     06-08  Mg     1.6     06-08    TPro  6.6  /  Alb  3.8  /  TBili  1.0  /  DBili  x   /  AST  13  /  ALT  10  /  AlkPhos  61  06-08    PT/INR - ( 07 Jun 2023 12:24 )   PT: 17.0 sec;   INR: 1.46 ratio         PTT - ( 07 Jun 2023 12:24 )  PTT:41.2 sec            IMAGING:    [X] All pertinent imaging reviewed by me Sherman Lafleur MD  Internal Medicine, PGY-2  Please Contact via TEAMS    SUBJECTIVE / OVERNIGHT EVENTS:  - Pt seen and examined at bedside  - BRAYANON  - Patient reports good urination overnight. She has significantly less back pain which is her main symptom when overloaded. No chest pain, nausea, vomiting    MEDICATIONS  (STANDING):  chlorhexidine 2% Cloths 1 Application(s) Topical <User Schedule>  furosemide   Injectable 40 milliGRAM(s) IV Push <User Schedule>  ipratropium    for Nebulization 500 MICROGram(s) Nebulizer every 6 hours  pantoprazole    Tablet 40 milliGRAM(s) Oral before breakfast  potassium chloride    Tablet ER 40 milliEquivalent(s) Oral once  predniSONE   Tablet 10 milliGRAM(s) Oral daily  Remodulin (Treprostinil) SQ infusion 1 Dose(s) 1 Dose(s) SubCutaneous Continuous Pump  rivaroxaban 20 milliGRAM(s) Oral daily  sildenafil (REVATIO) 20 milliGRAM(s) Oral <User Schedule>  spironolactone 25 milliGRAM(s) Oral <User Schedule>    MEDICATIONS  (PRN):  ipratropium 17 MICROgram(s) HFA Inhaler 1 Puff(s) Inhalation every 6 hours PRN Shortness of Breath/Wheezing          PHYSICAL EXAM:  Vital Signs Last 24 Hrs  T(C): 36.3 (08 Jun 2023 03:55), Max: 36.9 (07 Jun 2023 20:57)  T(F): 97.4 (08 Jun 2023 03:55), Max: 98.4 (07 Jun 2023 20:57)  HR: 104 (08 Jun 2023 03:55) (90 - 104)  BP: 119/79 (08 Jun 2023 03:55) (102/77 - 120/73)  BP(mean): 84 (07 Jun 2023 15:28) (84 - 84)  RR: 18 (08 Jun 2023 03:55) (18 - 22)  SpO2: 94% (08 Jun 2023 03:55) (94% - 98%)    Parameters below as of 08 Jun 2023 03:55  Patient On (Oxygen Delivery Method): nasal cannula  O2 Flow (L/min): 2      CAPILLARY BLOOD GLUCOSE        I&O's Summary      CONSTITUTIONAL: NAD, well-developed  RESPIRATORY: Normal respiratory effort; lungs are clear to auscultation bilaterally; on NC   CARDIOVASCULAR: Regular rate and rhythm, normal S1 and S2, no murmur/rub/gallop; No lower extremity edema;  ABDOMEN: Nontender to palpation, normoactive bowel sounds, no rebound/guarding; No hepatosplenomegaly  MUSCLOSKELETAL: no clubbing or cyanosis of digits; no joint swelling or tenderness to palpation  PSYCH: A+O to person, place, and time; affect appropriate    LABS:                        12.3   9.62  )-----------( 389      ( 08 Jun 2023 05:35 )             41.6     06-08    138  |  105  |  8   ----------------------------<  90  3.2<L>   |  20<L>  |  0.89    Ca    8.3<L>      08 Jun 2023 05:34  Phos  3.3     06-08  Mg     1.6     06-08    TPro  6.6  /  Alb  3.8  /  TBili  1.0  /  DBili  x   /  AST  13  /  ALT  10  /  AlkPhos  61  06-08    PT/INR - ( 07 Jun 2023 12:24 )   PT: 17.0 sec;   INR: 1.46 ratio         PTT - ( 07 Jun 2023 12:24 )  PTT:41.2 sec            IMAGING:    [X] All pertinent imaging reviewed by me

## 2023-06-08 NOTE — PROGRESS NOTE ADULT - PROBLEM SELECTOR PLAN 1
Likely in the setting of CHF exacerbation vs pHTN exacerbation. May be due to lack of pt tolerance to increasing doses of Rimodulin.   - Titrate O2 for SpO2 > 90%  - Continue Rimodulin  - Lasix 40 mg IV BID as per heart failure  - Repeat TTE as per heart failure   - Recs appreciated from Heart failure and Pulmonology  - Strict I&Os, daily weights Likely in the setting of CHF exacerbation vs pHTN exacerbation. May be due to lack of pt tolerance to increasing doses of Rimodulin. TTE w/ RV overload however preserved LV systolic function.   - Titrate O2 for SpO2 > 90%  - Continue Rimodulin  - Lasix 40 mg IV BID as per heart failure  - Recs appreciated from Heart failure and Pulmonology  - Strict I&Os, daily weights

## 2023-06-08 NOTE — PROGRESS NOTE ADULT - PROBLEM SELECTOR PLAN 2
Hx of Type 1 and 4 pulmonary hypertension on Rimodulin and Xarelto  - Increase spironolactone as per HF to 25 mg BID  - Continue home sildenafil TID  - Continue Atrovent

## 2023-06-08 NOTE — PATIENT PROFILE ADULT - FALL HARM RISK - HARM RISK INTERVENTIONS

## 2023-06-08 NOTE — PROGRESS NOTE ADULT - ATTENDING COMMENTS
41 y/o F w severe Pulmonary Hypertension (On Rimodulin and Xarelto, on home 2L O2), RA thrombus s/p thrombectomy a/w acute on chronic RHF and hypoxia.     Improving, continue diuresis. HF and Pulm teams following.

## 2023-06-09 ENCOUNTER — NON-APPOINTMENT (OUTPATIENT)
Age: 43
End: 2023-06-09

## 2023-06-09 LAB
ALBUMIN SERPL ELPH-MCNC: 4.2 G/DL — SIGNIFICANT CHANGE UP (ref 3.3–5)
ALP SERPL-CCNC: 68 U/L — SIGNIFICANT CHANGE UP (ref 40–120)
ALT FLD-CCNC: 9 U/L — LOW (ref 10–45)
ANION GAP SERPL CALC-SCNC: 15 MMOL/L — SIGNIFICANT CHANGE UP (ref 5–17)
AST SERPL-CCNC: 11 U/L — SIGNIFICANT CHANGE UP (ref 10–40)
BILIRUB SERPL-MCNC: 0.9 MG/DL — SIGNIFICANT CHANGE UP (ref 0.2–1.2)
BUN SERPL-MCNC: 13 MG/DL — SIGNIFICANT CHANGE UP (ref 7–23)
CALCIUM SERPL-MCNC: 8.9 MG/DL — SIGNIFICANT CHANGE UP (ref 8.4–10.5)
CHLORIDE SERPL-SCNC: 100 MMOL/L — SIGNIFICANT CHANGE UP (ref 96–108)
CO2 SERPL-SCNC: 21 MMOL/L — LOW (ref 22–31)
CREAT SERPL-MCNC: 1.03 MG/DL — SIGNIFICANT CHANGE UP (ref 0.5–1.3)
EGFR: 70 ML/MIN/1.73M2 — SIGNIFICANT CHANGE UP
GLUCOSE SERPL-MCNC: 133 MG/DL — HIGH (ref 70–99)
HCT VFR BLD CALC: 44.2 % — SIGNIFICANT CHANGE UP (ref 34.5–45)
HGB BLD-MCNC: 13.8 G/DL — SIGNIFICANT CHANGE UP (ref 11.5–15.5)
MAGNESIUM SERPL-MCNC: 1.7 MG/DL — SIGNIFICANT CHANGE UP (ref 1.6–2.6)
MCHC RBC-ENTMCNC: 20.5 PG — LOW (ref 27–34)
MCHC RBC-ENTMCNC: 31.2 GM/DL — LOW (ref 32–36)
MCV RBC AUTO: 65.7 FL — LOW (ref 80–100)
NRBC # BLD: 0 /100 WBCS — SIGNIFICANT CHANGE UP (ref 0–0)
PHOSPHATE SERPL-MCNC: 3.2 MG/DL — SIGNIFICANT CHANGE UP (ref 2.5–4.5)
PLATELET # BLD AUTO: 454 K/UL — HIGH (ref 150–400)
POTASSIUM SERPL-MCNC: 3.4 MMOL/L — LOW (ref 3.5–5.3)
POTASSIUM SERPL-SCNC: 3.4 MMOL/L — LOW (ref 3.5–5.3)
PROT SERPL-MCNC: 7.5 G/DL — SIGNIFICANT CHANGE UP (ref 6–8.3)
RBC # BLD: 6.73 M/UL — HIGH (ref 3.8–5.2)
RBC # FLD: 21.7 % — HIGH (ref 10.3–14.5)
SODIUM SERPL-SCNC: 136 MMOL/L — SIGNIFICANT CHANGE UP (ref 135–145)
WBC # BLD: 9.46 K/UL — SIGNIFICANT CHANGE UP (ref 3.8–10.5)
WBC # FLD AUTO: 9.46 K/UL — SIGNIFICANT CHANGE UP (ref 3.8–10.5)

## 2023-06-09 PROCEDURE — 99233 SBSQ HOSP IP/OBS HIGH 50: CPT

## 2023-06-09 PROCEDURE — 99232 SBSQ HOSP IP/OBS MODERATE 35: CPT

## 2023-06-09 PROCEDURE — 99232 SBSQ HOSP IP/OBS MODERATE 35: CPT | Mod: GC

## 2023-06-09 RX ORDER — FUROSEMIDE 40 MG
40 TABLET ORAL DAILY
Refills: 0 | Status: DISCONTINUED | OUTPATIENT
Start: 2023-06-10 | End: 2023-06-12

## 2023-06-09 RX ORDER — DIPHENHYDRAMINE HCL 50 MG
25 CAPSULE ORAL EVERY 8 HOURS
Refills: 0 | Status: DISCONTINUED | OUTPATIENT
Start: 2023-06-09 | End: 2023-06-12

## 2023-06-09 RX ORDER — POTASSIUM CHLORIDE 20 MEQ
40 PACKET (EA) ORAL ONCE
Refills: 0 | Status: COMPLETED | OUTPATIENT
Start: 2023-06-09 | End: 2023-06-09

## 2023-06-09 RX ADMIN — PANTOPRAZOLE SODIUM 40 MILLIGRAM(S): 20 TABLET, DELAYED RELEASE ORAL at 05:47

## 2023-06-09 RX ADMIN — Medication 500 MICROGRAM(S): at 09:45

## 2023-06-09 RX ADMIN — Medication 20 MILLIGRAM(S): at 07:45

## 2023-06-09 RX ADMIN — Medication 500 MICROGRAM(S): at 15:01

## 2023-06-09 RX ADMIN — SPIRONOLACTONE 25 MILLIGRAM(S): 25 TABLET, FILM COATED ORAL at 18:04

## 2023-06-09 RX ADMIN — RIVAROXABAN 20 MILLIGRAM(S): KIT at 11:43

## 2023-06-09 RX ADMIN — Medication 10 MILLIGRAM(S): at 05:47

## 2023-06-09 RX ADMIN — Medication 40 MILLIGRAM(S): at 10:28

## 2023-06-09 RX ADMIN — SPIRONOLACTONE 25 MILLIGRAM(S): 25 TABLET, FILM COATED ORAL at 09:00

## 2023-06-09 RX ADMIN — Medication 25 MILLIGRAM(S): at 21:44

## 2023-06-09 RX ADMIN — Medication 500 MICROGRAM(S): at 21:42

## 2023-06-09 RX ADMIN — Medication 500 MICROGRAM(S): at 03:46

## 2023-06-09 RX ADMIN — CHLORHEXIDINE GLUCONATE 1 APPLICATION(S): 213 SOLUTION TOPICAL at 07:45

## 2023-06-09 RX ADMIN — Medication 20 MILLIGRAM(S): at 15:03

## 2023-06-09 RX ADMIN — Medication 20 MILLIGRAM(S): at 21:42

## 2023-06-09 RX ADMIN — Medication 40 MILLIEQUIVALENT(S): at 12:00

## 2023-06-09 RX ADMIN — Medication 25 MILLIGRAM(S): at 12:00

## 2023-06-09 NOTE — PROGRESS NOTE ADULT - ASSESSMENT
41 y/o F PMH Pulmonary Hypertension (On Rimodulin and Xarelto, on home 2L O2), RA thrombus s/p thrombectomy, multiple abdominal wall abscesses (last 5/18 s/p I&D), presenting with AHRF from pulmonary hypertension vs CHF exacerbation.

## 2023-06-09 NOTE — PROGRESS NOTE ADULT - SUBJECTIVE AND OBJECTIVE BOX
Sherman Lafleur MD  Internal Medicine, PGY-2  Please Contact via TEAMS    SUBJECTIVE / OVERNIGHT EVENTS:  - Pt seen and examined at bedside  - FIDENCIO  - Patient denies any complaints overnight. The patient denies any fevers, chills, nausea, vomiting, or increased pain.     MEDICATIONS  (STANDING):  chlorhexidine 2% Cloths 1 Application(s) Topical <User Schedule>  furosemide   Injectable 40 milliGRAM(s) IV Push <User Schedule>  ipratropium    for Nebulization 500 MICROGram(s) Nebulizer every 6 hours  pantoprazole    Tablet 40 milliGRAM(s) Oral before breakfast  predniSONE   Tablet 10 milliGRAM(s) Oral daily  Remodulin (Treprostinil) SQ infusion 1 Dose(s) 1 Dose(s) SubCutaneous Continuous Pump  rivaroxaban 20 milliGRAM(s) Oral daily  sildenafil (REVATIO) 20 milliGRAM(s) Oral <User Schedule>  spironolactone 25 milliGRAM(s) Oral <User Schedule>    MEDICATIONS  (PRN):  acetaminophen     Tablet .. 650 milliGRAM(s) Oral every 6 hours PRN Mild Pain (1 - 3), Moderate Pain (4 - 6)  diphenhydrAMINE 25 milliGRAM(s) Oral every 6 hours PRN Rash and/or Itching  ipratropium 17 MICROgram(s) HFA Inhaler 1 Puff(s) Inhalation every 6 hours PRN Shortness of Breath/Wheezing        06-08-23 @ 07:01  -  06-09-23 @ 07:00  --------------------------------------------------------  IN: 840 mL / OUT: 2400 mL / NET: -1560 mL        PHYSICAL EXAM:  Vital Signs Last 24 Hrs  T(C): 36.8 (09 Jun 2023 04:00), Max: 36.8 (09 Jun 2023 04:00)  T(F): 98.3 (09 Jun 2023 04:00), Max: 98.3 (09 Jun 2023 04:00)  HR: 104 (09 Jun 2023 04:00) (95 - 112)  BP: 90/62 (09 Jun 2023 04:00) (90/62 - 112/72)  BP(mean): --  RR: 18 (09 Jun 2023 04:00) (17 - 18)  SpO2: 94% (09 Jun 2023 04:00) (93% - 94%)    Parameters below as of 09 Jun 2023 04:00  Patient On (Oxygen Delivery Method): nasal cannula        CAPILLARY BLOOD GLUCOSE        I&O's Summary    08 Jun 2023 07:01  -  09 Jun 2023 07:00  --------------------------------------------------------  IN: 840 mL / OUT: 2400 mL / NET: -1560 mL        CONSTITUTIONAL: NAD, well-developed  RESPIRATORY: Normal respiratory effort; lungs are clear to auscultation bilaterally  CARDIOVASCULAR: Regular rate and rhythm, normal S1 and S2, no murmur/rub/gallop; No lower extremity edema; Peripheral pulses are 2+ bilaterally  ABDOMEN: Nontender to palpation, normoactive bowel sounds, no rebound/guarding; No hepatosplenomegaly  MUSCLOSKELETAL: trace LE edema to ankles b/l   PSYCH: A+O to person, place, and time; affect appropriate    LABS:                        12.3   9.62  )-----------( 389      ( 08 Jun 2023 05:35 )             41.6     06-08    138  |  105  |  8   ----------------------------<  90  3.2<L>   |  20<L>  |  0.89    Ca    8.3<L>      08 Jun 2023 05:34  Phos  3.3     06-08  Mg     1.6     06-08    TPro  6.6  /  Alb  3.8  /  TBili  1.0  /  DBili  x   /  AST  13  /  ALT  10  /  AlkPhos  61  06-08    PT/INR - ( 07 Jun 2023 12:24 )   PT: 17.0 sec;   INR: 1.46 ratio         PTT - ( 07 Jun 2023 12:24 )  PTT:41.2 sec            IMAGING:    [X] All pertinent imaging reviewed by me

## 2023-06-09 NOTE — PROGRESS NOTE ADULT - PROBLEM SELECTOR PLAN 2
Pulmonary HTN group I and IV)  - on continuous remodulin at 44 mcg/kg/min; management per Pulm  - remains on home O2 requirment @2L/min NC  - follow up with Dr. Yoon.

## 2023-06-09 NOTE — PROGRESS NOTE ADULT - ASSESSMENT
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 She is volume up and warm on exam. Labs notable for rise in ProBNP to 700 (was previously normal on recent outpt labs), otherwise normal renal and hepatic chemistries.    Since admitted to floors she has responded well to IV Lasix, with downtrend in weight and symptomatic improvement.      Cardiac Studies  6/8 TTE LVIDd 3.4cm, LVEF 64%, interdeterminate DD, mod RVE, TAPSE 2.1cm, Trace MR, trace TR, mld-mod NJ  8/2022 TTE revealed RA thrombus with concern for clot in transit. She underwent catheter directed partial  thrombus aspiration 8/31  Limited TTE 8/31 @10:30: LVIDd 3.2 cm, hyperdynamic LV, new echogenic mass in RA 1.8 cm x 3.1 cm likely representing clot in transit (not seen on TTE from 8/29), RVE with decreased RV systolic function, est PASP 60 mmHg  9/1/22 Cath for venous thrombectomy right arm   RHC 7/23/20 RA 4, RV 80/4, PA 80/40/57, PCWP 22, LVEDP 4; CO/CI 4/2.0; PVR 11 (using EDP 13).

## 2023-06-09 NOTE — PROGRESS NOTE ADULT - PROBLEM SELECTOR PLAN 1
Likely in the setting of CHF exacerbation vs pHTN exacerbation. May be due to lack of pt tolerance to increasing doses of Rimodulin. TTE w/ RV overload however preserved LV systolic function.   - Titrate O2 for SpO2 > 90%  - Continue Rimodulin  - Lasix 40 mg IV BID, diuresis as per heart failure   - As per pulmonology, may trial increase in Rimodulin while in patient  - Recs appreciated from Heart failure and Pulmonology  - Strict I&Os, daily weights

## 2023-06-09 NOTE — PROGRESS NOTE ADULT - ASSESSMENT
42 y.o. female with pulmonary arterial hypertension, hx of PE, PPM admitted for worsening dyspnea due to possible fluid overload     Plan   - TTE shows no worsening of RV dysfunction   - c/w Lasix 40mg IV BID as per HF team   - Strict I/Os   - Daily weights   - Continue home dose of Remodulin SQ 44 ng/kg/min   - continue sildenafil 20mg q8h   - continue spirolactone 25mg once a day  - continue Xarelto 20mg once a day   - Appreciate heart failure team recommendations   - Following diuresis will trial to increase Remodulin dose and assess for tolerance   - Goal SaO2 of >92%  - PT/OT  - OOB to chair     Cain Burks MD   Pulmonary/Critical Care Fellow PGY-7  [facility] Pager #: [phone]  Spectra #: [id number]

## 2023-06-09 NOTE — PROGRESS NOTE ADULT - ATTENDING COMMENTS
Upon assessment reddened hard area to R buttock noted appearing like perirectal abscess pt seen   and examined       d/w the ptient and her family in detail    still     deciding about her IV flolan  for pulm htn

## 2023-06-09 NOTE — PROGRESS NOTE ADULT - SUBJECTIVE AND OBJECTIVE BOX
CHIEF COMPLAINT:Patient is a 42y old  Female who presents with a chief complaint of Dyspnea on Exertion (08 Jun 2023 08:24)      Interval Events:     REVIEW OF SYSTEMS:  [x] All other systems negative except per HPI   [ ] Unable to assess ROS because ________    OBJECTIVE:  ICU Vital Signs Last 24 Hrs  T(C): 36.8 (09 Jun 2023 04:00), Max: 36.8 (09 Jun 2023 04:00)  T(F): 98.3 (09 Jun 2023 04:00), Max: 98.3 (09 Jun 2023 04:00)  HR: 104 (09 Jun 2023 04:00) (95 - 112)  BP: 90/62 (09 Jun 2023 04:00) (90/62 - 112/72)  BP(mean): --  ABP: --  ABP(mean): --  RR: 18 (09 Jun 2023 04:00) (17 - 18)  SpO2: 94% (09 Jun 2023 04:00) (93% - 94%)    O2 Parameters below as of 09 Jun 2023 04:00  Patient On (Oxygen Delivery Method): nasal cannula              06-08 @ 07:01  -  06-09 @ 07:00  --------------------------------------------------------  IN: 840 mL / OUT: 2400 mL / NET: -1560 mL        PHYSICAL EXAM:  GENERAL: NAD, well-groomed, well-developed  EYES: EOMI, PERRLA, conjunctiva and sclera clear  ENMT: No tonsillar erythema, exudates, or enlargement; Moist mucous membranes, Good dentition, No lesions  CHEST/LUNG: Clear to auscultation bilaterally; No rales, rhonchi, wheezing, or rubs  HEART: Regular rate and rhythm; No murmurs, rubs, or gallops  ABDOMEN: Soft, Nontender, Nondistended; Bowel sounds present  VASCULAR:  2+ Peripheral Pulses, No clubbing, cyanosis, or edema  LYMPH: No lymphadenopathy noted  SKIN: No rashes or lesions  NERVOUS SYSTEM:  Alert & Oriented X3, Good concentration    HOSPITAL MEDICATIONS:  MEDICATIONS  (STANDING):  chlorhexidine 2% Cloths 1 Application(s) Topical <User Schedule>  furosemide   Injectable 40 milliGRAM(s) IV Push <User Schedule>  ipratropium    for Nebulization 500 MICROGram(s) Nebulizer every 6 hours  pantoprazole    Tablet 40 milliGRAM(s) Oral before breakfast  predniSONE   Tablet 10 milliGRAM(s) Oral daily  Remodulin (Treprostinil) SQ infusion 1 Dose(s) 1 Dose(s) SubCutaneous Continuous Pump  rivaroxaban 20 milliGRAM(s) Oral daily  sildenafil (REVATIO) 20 milliGRAM(s) Oral <User Schedule>  spironolactone 25 milliGRAM(s) Oral <User Schedule>    MEDICATIONS  (PRN):  acetaminophen     Tablet .. 650 milliGRAM(s) Oral every 6 hours PRN Mild Pain (1 - 3), Moderate Pain (4 - 6)  diphenhydrAMINE 25 milliGRAM(s) Oral every 6 hours PRN Rash and/or Itching  ipratropium 17 MICROgram(s) HFA Inhaler 1 Puff(s) Inhalation every 6 hours PRN Shortness of Breath/Wheezing      LABS:    The Labs were reviewed by me   The Radiology was reviewed by me    EKG tracing reviewed by me    06-08    138  |  105  |  8   ----------------------------<  90  3.2<L>   |  20<L>  |  0.89  06-07    138  |  106  |  10  ----------------------------<  81  3.4<L>   |  20<L>  |  0.93    Ca    8.3<L>      08 Jun 2023 05:34  Ca    8.2<L>      07 Jun 2023 12:24  Phos  3.3     06-08  Mg     1.6     06-08    TPro  6.6  /  Alb  3.8  /  TBili  1.0  /  DBili  x   /  AST  13  /  ALT  10  /  AlkPhos  61  06-08  TPro  6.4  /  Alb  3.7  /  TBili  0.7  /  DBili  x   /  AST  10  /  ALT  10  /  AlkPhos  59  06-07    Magnesium, Serum: 1.6 mg/dL (06-08-23 @ 05:34)  Magnesium, Serum: 1.7 mg/dL (06-07-23 @ 12:24)    Phosphorus Level, Serum: 3.3 mg/dL (06-08-23 @ 05:34)      PT/INR - ( 07 Jun 2023 12:24 )   PT: 17.0 sec;   INR: 1.46 ratio         PTT - ( 07 Jun 2023 12:24 )  PTT:41.2 sec                                        12.3   9.62  )-----------( 389      ( 08 Jun 2023 05:35 )             41.6                         11.6   8.01  )-----------( 363      ( 07 Jun 2023 12:24 )             37.7     CAPILLARY BLOOD GLUCOSE        Blood Gas Source Venous: Venous (06-07-23 @ 12:25)      MICROBIOLOGY:     RADIOLOGY:  [ ] Reviewed and interpreted by me    Point of Care Ultrasound Findings:    ECHO: < from: TTE W or WO Ultrasound Enhancing Agent (06.08.23 @ 09:38) >   1. Small left ventricular cavity size. The left ventricular wall thickness is normal. Right ventricular pressure overload. The left ventricular systolic function is normal with an ejection fraction of 64 % by Holcomb's method of disks. There are no regional wall motion abnormalities seen.   2. Moderately enlarged right ventricular cavity size, increased wall thickness and moderately reduced systolic function. The tricuspid annular plane systolic excursion (TAPSE) is 2.1 cm (normal >=1.7 cm).   3. Device lead is visualized in the right heart.   4. Trace pericardial effusion noted adjacent to the right atrium.   5. Compared to the transthoracic echocardiogram performed on 11/2/2020 there have been no significant interval changes.    < end of copied text >

## 2023-06-09 NOTE — PROGRESS NOTE ADULT - NS ATTEND AMEND GEN_ALL_CORE FT
42/F with h/o Group 1 and IV PAH on SQ remodulin, sildenafil, RV dysfunction, RA thrombus, PPM for bradycardia, presents to ER with worsening SOB and Tachycardia.   repeat TTE with normal LV function, dilated RV with TAPSE 2.2cms, mod dec function.   appears unchanged from echo from Jan 2023    Imp:  ADHF Rv dysfunction  lost 4lbs since admission  'switch to laisx 40mg po daily  strict i/o's  cont jacqueline 25mg po BID   will get an early appt with Dr. Chavis     PAH: cont sub q remodulin, sildenafil  Pulm HTN following    Hf will signoff, pls call with questions

## 2023-06-09 NOTE — PROGRESS NOTE ADULT - ATTENDING COMMENTS
Pt is a 42F with PMHx asthma (moderate-severe persistent), pulmHTN 2/2 PAH (WHO Group I, Class III) on treprostinil SQ with chronic hypoxemic respiratory failure (2L NC ATC) and hx tachycardia s/p PPM initially presenting to [facility] on 6/7/23 with worsening dyspnea and nausea from pulmonary office for IV diuresis and titration of treprostinil with hemodynamic monitoring.     Pt follows with Dr. Yoon at the pulmonary office, last seen 6/7/23 and recommended to come to ED for further care. Her pulmonary HTN is chronically managed with Sildenafil 20mg TID + Remodulin SQ 44ng/kg/mg + Prednisone 10mg QD (for asthma mgmt) as well as diuresis with spironolactone 25mg QD + furosemide 40mg QD (5x/week, increased from 20mg TIW a few months ago.) Pt initiated on IV diuresis on admission, pt reports continued clinical improvement in sx as well.     -c/w Prednisone 10mg QD (for asthma control), on slow taper as outpatient. Will need to re-address immunotherapy options after discharge   -Please add Budesonide nebulizer BID for added asthma chronic maintenance.   -c/w home Sildenafil 20mg TID  -c/w home Remodulin SQ (will need to verify and add via pharmacy as pt is currently wearing SQ pump)  -Please space diuretics at least 1 hour apart from Sildenafil to avoid BP fluctuations  -Heart failure team (Dr. Chavis) consulted, will f/u recs  -Pulmonary team will continue to follow closely. Discussed plan of care with Dr. Yoon Pt is a 42F with PMHx asthma (moderate-severe persistent), pulmHTN 2/2 PAH (WHO Group I, Class III) on treprostinil SQ with chronic hypoxemic respiratory failure (2L NC ATC) and hx tachycardia s/p PPM initially presenting to [facility] on 6/7/23 with worsening dyspnea and nausea from pulmonary office for IV diuresis and titration of treprostinil with hemodynamic monitoring.     Pt follows with Dr. Yoon at the pulmonary office, last seen 6/7/23 and recommended to come to ED for further care. Her pulmonary HTN is chronically managed with Sildenafil 20mg TID + Remodulin SQ 44ng/kg/mg + Prednisone 10mg QD (for asthma mgmt) as well as diuresis with spironolactone 25mg QD + furosemide 40mg QD (5x/week, increased from 20mg TIW a few months ago.) Pt initiated on IV diuresis on admission, pt reports continued clinical improvement in sx as well.     -c/w Prednisone 10mg QD (for asthma control), on slow taper as outpatient. Will need to re-address immunotherapy options after discharge   -Please add Budesonide nebulizer BID for added asthma chronic maintenance.   -c/w home Sildenafil 20mg TID  -c/w home Remodulin SQ (will need to verify and add via pharmacy as pt is currently wearing SQ pump)  -Please space diuretics at least 1 hour apart from Sildenafil to avoid BP fluctuations  -Heart failure team (Dr. Chavis) consulted, will f/u recs  -Pulmonary team will continue to follow closely. Discussed plan of care with Dr. Yoon. No pulmonary c/i to hospital discharge this weekend if pt otherwise medically optimized

## 2023-06-09 NOTE — PROGRESS NOTE ADULT - PROBLEM SELECTOR PLAN 1
Right heart failure with normal LVEF  - Stop IVP Lasix today; Transition to Lasix 40mg PO QD starting 6/10  - Continue spironolactone 25 mg BID   - Maintain Strict I/O's   - Daily standing weights  - Replete electrolytes to K>4.0 and Mg >2.5.  - From HF standpoint she is cleared for d/c as she is without active issues. Will arrange follow up with HF attending Dr. Chavis at [facility] at next available.

## 2023-06-09 NOTE — PROGRESS NOTE ADULT - SUBJECTIVE AND OBJECTIVE BOX
ADVANCED HEART FAILURE & TRANSPLANT  - PROGRESS NOTE  *To reach the NS1 Team from 8am to 5pm, please call [phone],   _______________________________________________________________________________________________________   Subjective:    Medications:  acetaminophen     Tablet .. 650 milliGRAM(s) Oral every 6 hours PRN  chlorhexidine 2% Cloths 1 Application(s) Topical <User Schedule>  diphenhydrAMINE 25 milliGRAM(s) Oral every 6 hours PRN  furosemide   Injectable 40 milliGRAM(s) IV Push <User Schedule>  ipratropium    for Nebulization 500 MICROGram(s) Nebulizer every 6 hours  ipratropium 17 MICROgram(s) HFA Inhaler 1 Puff(s) Inhalation every 6 hours PRN  pantoprazole    Tablet 40 milliGRAM(s) Oral before breakfast  predniSONE   Tablet 10 milliGRAM(s) Oral daily  Remodulin (Treprostinil) SQ infusion 1 Dose(s) 1 Dose(s) SubCutaneous Continuous Pump  rivaroxaban 20 milliGRAM(s) Oral daily  sildenafil (REVATIO) 20 milliGRAM(s) Oral <User Schedule>  spironolactone 25 milliGRAM(s) Oral <User Schedule>      Physical Exam:    Vitals:  Vital Signs Last 24 Hours  T(C): 36.8 (23 @ 04:00), Max: 36.8 (23 @ 04:00)  HR: 106 (23 @ 08:45) (98 - 112)  BP: 98/63 (23 @ 08:45) (90/62 - 104/73)  RR: 18 (23 @ 04:00) (17 - 18)  SpO2: 94% (23 @ 04:00) (93% - 94%)    Weight in k.3 ( @ 07:03)    I&O's Summary    2023 07:01  -  2023 07:00  --------------------------------------------------------  IN: 840 mL / OUT: 2400 mL / NET: -1560 mL    2023 07:01  -  2023 10:24  --------------------------------------------------------  IN: 360 mL / OUT: 700 mL / NET: -340 mL        Tele:    General: No distress. Comfortable.  HEENT: EOM intact.  Neck: Neck supple. JVP not elevated. No masses  Chest: Clear to auscultation bilaterally  CV: Normal S1 and S2. No murmurs, rub, or gallops. Radial pulses normal.  Abdomen: Soft, non-distended, non-tender  Skin: No rashes or skin breakdown  Extremities: No LE edema  Neurology: Alert and oriented times three. Sensation intact  Psych: Affect normal    Labs:                        13.8   9.46  )-----------( 454      ( 2023 09:58 )             44.2     06-08    138  |  105  |  8   ----------------------------<  90  3.2<L>   |  20<L>  |  0.89    Ca    8.3<L>      2023 05:34  Phos  3.3     06-08  Mg     1.6     06-08    TPro  6.6  /  Alb  3.8  /  TBili  1.0  /  DBili  x   /  AST  13  /  ALT  10  /  AlkPhos  61  06-08    PT/INR - ( 2023 12:24 )   PT: 17.0 sec;   INR: 1.46 ratio         PTT - ( 2023 12:24 )  PTT:41.2 sec               ADVANCED HEART FAILURE & TRANSPLANT  - PROGRESS NOTE  *To reach the NS1 Team from 8am to 5pm, please call [phone],   _______________________________________________________________________________________________________   Subjective:  -NAOE  - seen laying flat in bed comfortably    Medications:  acetaminophen     Tablet .. 650 milliGRAM(s) Oral every 6 hours PRN  chlorhexidine 2% Cloths 1 Application(s) Topical <User Schedule>  diphenhydrAMINE 25 milliGRAM(s) Oral every 6 hours PRN  furosemide   Injectable 40 milliGRAM(s) IV Push <User Schedule>  ipratropium    for Nebulization 500 MICROGram(s) Nebulizer every 6 hours  ipratropium 17 MICROgram(s) HFA Inhaler 1 Puff(s) Inhalation every 6 hours PRN  pantoprazole    Tablet 40 milliGRAM(s) Oral before breakfast  predniSONE   Tablet 10 milliGRAM(s) Oral daily  Remodulin (Treprostinil) SQ infusion 1 Dose(s) 1 Dose(s) SubCutaneous Continuous Pump  rivaroxaban 20 milliGRAM(s) Oral daily  sildenafil (REVATIO) 20 milliGRAM(s) Oral <User Schedule>  spironolactone 25 milliGRAM(s) Oral <User Schedule>      Physical Exam:    Vitals:  Vital Signs Last 24 Hours  T(C): 36.8 (23 @ 04:00), Max: 36.8 (23 @ 04:00)  HR: 106 (23 @ 08:45) (98 - 112)  BP: 98/63 (23 @ 08:45) (90/62 - 104/73)  RR: 18 (23 @ 04:00) (17 - 18)  SpO2: 94% (23 @ 04:00) (93% - 94%)    Weight in k.3 ( @ 07:03)    I&O's Summary    2023 07:01  -  2023 07:00  --------------------------------------------------------  IN: 840 mL / OUT: 2400 mL / NET: -1560 mL    2023 07:01  -  2023 10:24  --------------------------------------------------------  IN: 360 mL / OUT: 700 mL / NET: -340 mL    Tele: SR , HR up to 120's with exertion    General: No distress. Comfortable.  HEENT: EOM intact.  Neck: Neck supple. JVP not elevated. No masses  Chest: Clear to auscultation bilaterally  CV: Normal S1 and S2. No murmurs, rub, or gallops. Radial pulses normal, warm peripherally  Abdomen: Soft, non-distended, non-tender  Skin: No rashes or skin breakdown  Extremities: No LE edema  Neurology: Alert and oriented times three. Sensation intact  Psych: Affect normal    Labs:                        13.8   9.46  )-----------( 454      ( 2023 09:58 )             44.2     06-08    138  |  105  |  8   ----------------------------<  90  3.2<L>   |  20<L>  |  0.89    Ca    8.3<L>      2023 05:34  Phos  3.3     06-08  Mg     1.6     06-08    TPro  6.6  /  Alb  3.8  /  TBili  1.0  /  DBili  x   /  AST  13  /  ALT  10  /  AlkPhos  61  06-08    PT/INR - ( 2023 12:24 )   PT: 17.0 sec;   INR: 1.46 ratio    PTT - ( 2023 12:24 )  PTT:41.2 sec

## 2023-06-09 NOTE — PROGRESS NOTE ADULT - PROBLEM SELECTOR PLAN 4
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

## 2023-06-09 NOTE — PROGRESS NOTE ADULT - ATTENDING COMMENTS
43 y/o F w severe Pulmonary Hypertension (On Rimodulin and Xarelto, on home 2L O2), RA thrombus s/p thrombectomy a/w acute on chronic RHF and hypoxia.     Improved on aggressive diuresis, Lasix transitioned to po today.     Remodulin dose to be increased by Pulmonary, d/c pending Pulm input.

## 2023-06-10 LAB
ALBUMIN SERPL ELPH-MCNC: 4.5 G/DL — SIGNIFICANT CHANGE UP (ref 3.3–5)
ALP SERPL-CCNC: 69 U/L — SIGNIFICANT CHANGE UP (ref 40–120)
ALT FLD-CCNC: 10 U/L — SIGNIFICANT CHANGE UP (ref 10–45)
ANION GAP SERPL CALC-SCNC: 15 MMOL/L — SIGNIFICANT CHANGE UP (ref 5–17)
AST SERPL-CCNC: 13 U/L — SIGNIFICANT CHANGE UP (ref 10–40)
BILIRUB SERPL-MCNC: 0.9 MG/DL — SIGNIFICANT CHANGE UP (ref 0.2–1.2)
BUN SERPL-MCNC: 13 MG/DL — SIGNIFICANT CHANGE UP (ref 7–23)
CALCIUM SERPL-MCNC: 8.7 MG/DL — SIGNIFICANT CHANGE UP (ref 8.4–10.5)
CHLORIDE SERPL-SCNC: 100 MMOL/L — SIGNIFICANT CHANGE UP (ref 96–108)
CO2 SERPL-SCNC: 21 MMOL/L — LOW (ref 22–31)
CREAT SERPL-MCNC: 1.07 MG/DL — SIGNIFICANT CHANGE UP (ref 0.5–1.3)
EGFR: 67 ML/MIN/1.73M2 — SIGNIFICANT CHANGE UP
GLUCOSE SERPL-MCNC: 99 MG/DL — SIGNIFICANT CHANGE UP (ref 70–99)
HCT VFR BLD CALC: 45.6 % — HIGH (ref 34.5–45)
HGB BLD-MCNC: 14.1 G/DL — SIGNIFICANT CHANGE UP (ref 11.5–15.5)
MAGNESIUM SERPL-MCNC: 1.7 MG/DL — SIGNIFICANT CHANGE UP (ref 1.6–2.6)
MCHC RBC-ENTMCNC: 20.3 PG — LOW (ref 27–34)
MCHC RBC-ENTMCNC: 30.9 GM/DL — LOW (ref 32–36)
MCV RBC AUTO: 65.6 FL — LOW (ref 80–100)
NRBC # BLD: 0 /100 WBCS — SIGNIFICANT CHANGE UP (ref 0–0)
PHOSPHATE SERPL-MCNC: 2.7 MG/DL — SIGNIFICANT CHANGE UP (ref 2.5–4.5)
PLATELET # BLD AUTO: 451 K/UL — HIGH (ref 150–400)
POTASSIUM SERPL-MCNC: 3.7 MMOL/L — SIGNIFICANT CHANGE UP (ref 3.5–5.3)
POTASSIUM SERPL-SCNC: 3.7 MMOL/L — SIGNIFICANT CHANGE UP (ref 3.5–5.3)
PROT SERPL-MCNC: 7.8 G/DL — SIGNIFICANT CHANGE UP (ref 6–8.3)
RBC # BLD: 6.95 M/UL — HIGH (ref 3.8–5.2)
RBC # FLD: 21.7 % — HIGH (ref 10.3–14.5)
SODIUM SERPL-SCNC: 136 MMOL/L — SIGNIFICANT CHANGE UP (ref 135–145)
WBC # BLD: 9.07 K/UL — SIGNIFICANT CHANGE UP (ref 3.8–10.5)
WBC # FLD AUTO: 9.07 K/UL — SIGNIFICANT CHANGE UP (ref 3.8–10.5)

## 2023-06-10 PROCEDURE — 99232 SBSQ HOSP IP/OBS MODERATE 35: CPT | Mod: GC

## 2023-06-10 RX ORDER — MEN-PHOR .5; .5 G/G; G/G
1 LOTION TOPICAL
Refills: 0 | Status: DISCONTINUED | OUTPATIENT
Start: 2023-06-10 | End: 2023-06-12

## 2023-06-10 RX ADMIN — SPIRONOLACTONE 25 MILLIGRAM(S): 25 TABLET, FILM COATED ORAL at 09:44

## 2023-06-10 RX ADMIN — Medication 20 MILLIGRAM(S): at 06:13

## 2023-06-10 RX ADMIN — Medication 25 MILLIGRAM(S): at 15:01

## 2023-06-10 RX ADMIN — Medication 500 MICROGRAM(S): at 14:59

## 2023-06-10 RX ADMIN — Medication 20 MILLIGRAM(S): at 15:03

## 2023-06-10 RX ADMIN — SPIRONOLACTONE 25 MILLIGRAM(S): 25 TABLET, FILM COATED ORAL at 17:49

## 2023-06-10 RX ADMIN — Medication 40 MILLIGRAM(S): at 06:13

## 2023-06-10 RX ADMIN — CHLORHEXIDINE GLUCONATE 1 APPLICATION(S): 213 SOLUTION TOPICAL at 06:16

## 2023-06-10 RX ADMIN — RIVAROXABAN 20 MILLIGRAM(S): KIT at 11:19

## 2023-06-10 RX ADMIN — Medication 10 MILLIGRAM(S): at 06:13

## 2023-06-10 RX ADMIN — Medication 500 MICROGRAM(S): at 03:47

## 2023-06-10 RX ADMIN — PANTOPRAZOLE SODIUM 40 MILLIGRAM(S): 20 TABLET, DELAYED RELEASE ORAL at 06:13

## 2023-06-10 RX ADMIN — Medication 500 MICROGRAM(S): at 21:54

## 2023-06-10 RX ADMIN — MEN-PHOR 1 APPLICATION(S): .5; .5 LOTION TOPICAL at 21:54

## 2023-06-10 RX ADMIN — Medication 500 MICROGRAM(S): at 09:44

## 2023-06-10 RX ADMIN — Medication 20 MILLIGRAM(S): at 21:54

## 2023-06-10 NOTE — PROGRESS NOTE ADULT - ASSESSMENT
43 y/o F PMH Pulmonary Hypertension (On Rimodulin and Xarelto, on home 2L O2), RA thrombus s/p thrombectomy, multiple abdominal wall abscesses (last 5/18 s/p I&D), presenting with AHRF from pulmonary hypertension vs CHF exacerbation.

## 2023-06-10 NOTE — CHART NOTE - NSCHARTNOTEFT_GEN_A_CORE
43 y/o F w severe Pulmonary Hypertension (On Rimodulin and Xarelto, on home 2L O2), RA thrombus s/p thrombectomy a/w acute on chronic RHF and hypoxia.     ADHF Rv dysfunction  c/w lasix PO 40mg po daily  strict i/o's  C/W jacqueline 25mg po BID     PAH: cont sub q remodulin, sildenafil  Pulm HTN following  pulm-- will trial to increase Remodulin dose and assess for tolerance     Deborah Amador D.O.  Division of Hospital Medicine  Available on MS Teams

## 2023-06-10 NOTE — PROGRESS NOTE ADULT - SUBJECTIVE AND OBJECTIVE BOX
PROGRESS NOTE:   Authored by Monik Betancourt MD     Patient is a 42y old  Female who presents with a chief complaint of Dyspnea on Exertion (09 Jun 2023 10:23)      SUBJECTIVE / OVERNIGHT EVENTS:    ADDITIONAL REVIEW OF SYSTEMS:    MEDICATIONS  (STANDING):  chlorhexidine 2% Cloths 1 Application(s) Topical <User Schedule>  furosemide    Tablet 40 milliGRAM(s) Oral daily  ipratropium    for Nebulization 500 MICROGram(s) Nebulizer every 6 hours  pantoprazole    Tablet 40 milliGRAM(s) Oral before breakfast  predniSONE   Tablet 10 milliGRAM(s) Oral daily  Remodulin (Treprostinil) SQ infusion 1 Dose(s) 1 Dose(s) SubCutaneous Continuous Pump  rivaroxaban 20 milliGRAM(s) Oral daily  sildenafil (REVATIO) 20 milliGRAM(s) Oral <User Schedule>  spironolactone 25 milliGRAM(s) Oral <User Schedule>    MEDICATIONS  (PRN):  acetaminophen     Tablet .. 650 milliGRAM(s) Oral every 6 hours PRN Mild Pain (1 - 3), Moderate Pain (4 - 6)  diphenhydrAMINE Injectable 25 milliGRAM(s) IV Push every 8 hours PRN Rash and/or Itching  ipratropium 17 MICROgram(s) HFA Inhaler 1 Puff(s) Inhalation every 6 hours PRN Shortness of Breath/Wheezing      CAPILLARY BLOOD GLUCOSE        I&O's Summary    09 Jun 2023 07:01  -  10 Sudarshan 2023 07:00  --------------------------------------------------------  IN: 1080 mL / OUT: 700 mL / NET: 380 mL        PHYSICAL EXAM:  Vital Signs Last 24 Hrs  T(C): 36.7 (10 Sudarshan 2023 04:56), Max: 36.7 (09 Jun 2023 20:53)  T(F): 98.1 (10 Sudarshan 2023 04:56), Max: 98.1 (10 Sudarshan 2023 04:56)  HR: 120 (10 Sudarshan 2023 09:46) (107 - 120)  BP: 116/78 (10 Sudarshan 2023 09:46) (92/66 - 116/78)  BP(mean): --  RR: 18 (10 Sudarshan 2023 04:56) (18 - 18)  SpO2: 93% (10 Sudarshan 2023 04:56) (93% - 95%)    Parameters below as of 10 Sudarshan 2023 04:56  Patient On (Oxygen Delivery Method): nasal cannula  O2 Flow (L/min): 2      GENERAL: No acute distress, well-developed  HEAD:  Atraumatic, Normocephalic  EYES: EOMI, PERRLA, conjunctiva and sclera clear  NECK: Supple, no lymphadenopathy, no JVD  CHEST/LUNG: CTAB; No wheezes, rales, or rhonchi  HEART: Regular rate and rhythm; No murmurs, rubs, or gallops  ABDOMEN: Soft, non-tender, non-distended; normal bowel sounds, no organomegaly  EXTREMITIES:  2+ peripheral pulses b/l, No clubbing, cyanosis, or edema  NEUROLOGY: A&O x 3, no focal deficits  SKIN: No rashes or lesions    LABS:                        13.8   9.46  )-----------( 454      ( 09 Jun 2023 09:58 )             44.2     06-09    136  |  100  |  13  ----------------------------<  133<H>  3.4<L>   |  21<L>  |  1.03    Ca    8.9      09 Jun 2023 09:58  Phos  3.2     06-09  Mg     1.7     06-09    TPro  7.5  /  Alb  4.2  /  TBili  0.9  /  DBili  x   /  AST  11  /  ALT  9<L>  /  AlkPhos  68  06-09                RADIOLOGY & ADDITIONAL TESTS:  Results Reviewed:   Imaging Personally Reviewed:  Electrocardiogram Personally Reviewed:    COORDINATION OF CARE:  Care Discussed with Consultants/Other Providers [Y/N]:  Prior or Outpatient Records Reviewed [Y/N]:   PROGRESS NOTE:   Authored by Monik Betancourt MD     Patient is a 42y old  Female who presents with a chief complaint of Dyspnea on Exertion (09 Jun 2023 10:23)      SUBJECTIVE / OVERNIGHT EVENTS:  No acute events overnight. Patient was resting in bed and not complaint of any bothering symptoms. She denied chest pain, SOB, N/V.     ADDITIONAL REVIEW OF SYSTEMS:    MEDICATIONS  (STANDING):  chlorhexidine 2% Cloths 1 Application(s) Topical <User Schedule>  furosemide    Tablet 40 milliGRAM(s) Oral daily  ipratropium    for Nebulization 500 MICROGram(s) Nebulizer every 6 hours  pantoprazole    Tablet 40 milliGRAM(s) Oral before breakfast  predniSONE   Tablet 10 milliGRAM(s) Oral daily  Remodulin (Treprostinil) SQ infusion 1 Dose(s) 1 Dose(s) SubCutaneous Continuous Pump  rivaroxaban 20 milliGRAM(s) Oral daily  sildenafil (REVATIO) 20 milliGRAM(s) Oral <User Schedule>  spironolactone 25 milliGRAM(s) Oral <User Schedule>    MEDICATIONS  (PRN):  acetaminophen     Tablet .. 650 milliGRAM(s) Oral every 6 hours PRN Mild Pain (1 - 3), Moderate Pain (4 - 6)  diphenhydrAMINE Injectable 25 milliGRAM(s) IV Push every 8 hours PRN Rash and/or Itching  ipratropium 17 MICROgram(s) HFA Inhaler 1 Puff(s) Inhalation every 6 hours PRN Shortness of Breath/Wheezing      CAPILLARY BLOOD GLUCOSE        I&O's Summary    09 Jun 2023 07:01  -  10 Sudarshan 2023 07:00  --------------------------------------------------------  IN: 1080 mL / OUT: 700 mL / NET: 380 mL        PHYSICAL EXAM:  Vital Signs Last 24 Hrs  T(C): 36.7 (10 Sudarshan 2023 04:56), Max: 36.7 (09 Jun 2023 20:53)  T(F): 98.1 (10 Sudarshan 2023 04:56), Max: 98.1 (10 Sudarshan 2023 04:56)  HR: 120 (10 Sudarshan 2023 09:46) (107 - 120)  BP: 116/78 (10 Sudarshan 2023 09:46) (92/66 - 116/78)  BP(mean): --  RR: 18 (10 Sudarshan 2023 04:56) (18 - 18)  SpO2: 93% (10 Sudarshan 2023 04:56) (93% - 95%)    Parameters below as of 10 Sudarshan 2023 04:56  Patient On (Oxygen Delivery Method): nasal cannula  O2 Flow (L/min): 2      GENERAL: No acute distress, well-developed  HEAD:  Atraumatic, Normocephalic  EYES: EOMI, PERRLA, conjunctiva and sclera clear  NECK: Supple, no lymphadenopathy, no JVD  CHEST/LUNG: CTAB; No wheezes, rales, or rhonchi  HEART: Regular rate and rhythm; No murmurs, rubs, or gallops  ABDOMEN: Soft, non-tender, non-distended; normal bowel sounds, no organomegaly  EXTREMITIES:  2+ peripheral pulses b/l, No clubbing, cyanosis, or edema  NEUROLOGY: A&O x 3, no focal deficits  SKIN: No rashes or lesions    LABS:                        13.8   9.46  )-----------( 454      ( 09 Jun 2023 09:58 )             44.2     06-09    136  |  100  |  13  ----------------------------<  133<H>  3.4<L>   |  21<L>  |  1.03    Ca    8.9      09 Jun 2023 09:58  Phos  3.2     06-09  Mg     1.7     06-09    TPro  7.5  /  Alb  4.2  /  TBili  0.9  /  DBili  x   /  AST  11  /  ALT  9<L>  /  AlkPhos  68  06-09                RADIOLOGY & ADDITIONAL TESTS:  Results Reviewed:   Imaging Personally Reviewed:  Electrocardiogram Personally Reviewed:    COORDINATION OF CARE:  Care Discussed with Consultants/Other Providers [Y/N]:  Prior or Outpatient Records Reviewed [Y/N]:

## 2023-06-11 LAB
ALBUMIN SERPL ELPH-MCNC: 4.4 G/DL — SIGNIFICANT CHANGE UP (ref 3.3–5)
ALP SERPL-CCNC: 69 U/L — SIGNIFICANT CHANGE UP (ref 40–120)
ALT FLD-CCNC: 12 U/L — SIGNIFICANT CHANGE UP (ref 10–45)
ANION GAP SERPL CALC-SCNC: 15 MMOL/L — SIGNIFICANT CHANGE UP (ref 5–17)
AST SERPL-CCNC: 14 U/L — SIGNIFICANT CHANGE UP (ref 10–40)
BILIRUB SERPL-MCNC: 0.7 MG/DL — SIGNIFICANT CHANGE UP (ref 0.2–1.2)
BUN SERPL-MCNC: 17 MG/DL — SIGNIFICANT CHANGE UP (ref 7–23)
CALCIUM SERPL-MCNC: 9.1 MG/DL — SIGNIFICANT CHANGE UP (ref 8.4–10.5)
CHLORIDE SERPL-SCNC: 100 MMOL/L — SIGNIFICANT CHANGE UP (ref 96–108)
CO2 SERPL-SCNC: 20 MMOL/L — LOW (ref 22–31)
CREAT SERPL-MCNC: 1.11 MG/DL — SIGNIFICANT CHANGE UP (ref 0.5–1.3)
EGFR: 64 ML/MIN/1.73M2 — SIGNIFICANT CHANGE UP
GLUCOSE SERPL-MCNC: 98 MG/DL — SIGNIFICANT CHANGE UP (ref 70–99)
HCT VFR BLD CALC: 46 % — HIGH (ref 34.5–45)
HGB BLD-MCNC: 14 G/DL — SIGNIFICANT CHANGE UP (ref 11.5–15.5)
MAGNESIUM SERPL-MCNC: 1.8 MG/DL — SIGNIFICANT CHANGE UP (ref 1.6–2.6)
MCHC RBC-ENTMCNC: 20.3 PG — LOW (ref 27–34)
MCHC RBC-ENTMCNC: 30.4 GM/DL — LOW (ref 32–36)
MCV RBC AUTO: 66.7 FL — LOW (ref 80–100)
NRBC # BLD: 0 /100 WBCS — SIGNIFICANT CHANGE UP (ref 0–0)
PHOSPHATE SERPL-MCNC: 2.6 MG/DL — SIGNIFICANT CHANGE UP (ref 2.5–4.5)
PLATELET # BLD AUTO: 421 K/UL — HIGH (ref 150–400)
POTASSIUM SERPL-MCNC: 4.2 MMOL/L — SIGNIFICANT CHANGE UP (ref 3.5–5.3)
POTASSIUM SERPL-SCNC: 4.2 MMOL/L — SIGNIFICANT CHANGE UP (ref 3.5–5.3)
PROT SERPL-MCNC: 7.8 G/DL — SIGNIFICANT CHANGE UP (ref 6–8.3)
RBC # BLD: 6.9 M/UL — HIGH (ref 3.8–5.2)
RBC # FLD: 21.5 % — HIGH (ref 10.3–14.5)
SODIUM SERPL-SCNC: 135 MMOL/L — SIGNIFICANT CHANGE UP (ref 135–145)
WBC # BLD: 9.24 K/UL — SIGNIFICANT CHANGE UP (ref 3.8–10.5)
WBC # FLD AUTO: 9.24 K/UL — SIGNIFICANT CHANGE UP (ref 3.8–10.5)

## 2023-06-11 PROCEDURE — 99232 SBSQ HOSP IP/OBS MODERATE 35: CPT | Mod: GC

## 2023-06-11 RX ADMIN — Medication 500 MICROGRAM(S): at 19:24

## 2023-06-11 RX ADMIN — Medication 20 MILLIGRAM(S): at 07:35

## 2023-06-11 RX ADMIN — MEN-PHOR 1 APPLICATION(S): .5; .5 LOTION TOPICAL at 06:40

## 2023-06-11 RX ADMIN — SPIRONOLACTONE 25 MILLIGRAM(S): 25 TABLET, FILM COATED ORAL at 08:26

## 2023-06-11 RX ADMIN — CHLORHEXIDINE GLUCONATE 1 APPLICATION(S): 213 SOLUTION TOPICAL at 08:20

## 2023-06-11 RX ADMIN — Medication 500 MICROGRAM(S): at 08:22

## 2023-06-11 RX ADMIN — Medication 500 MICROGRAM(S): at 12:16

## 2023-06-11 RX ADMIN — SPIRONOLACTONE 25 MILLIGRAM(S): 25 TABLET, FILM COATED ORAL at 17:01

## 2023-06-11 RX ADMIN — Medication 500 MICROGRAM(S): at 05:34

## 2023-06-11 RX ADMIN — MEN-PHOR 1 APPLICATION(S): .5; .5 LOTION TOPICAL at 11:39

## 2023-06-11 RX ADMIN — Medication 20 MILLIGRAM(S): at 21:16

## 2023-06-11 RX ADMIN — Medication 650 MILLIGRAM(S): at 07:35

## 2023-06-11 RX ADMIN — Medication 40 MILLIGRAM(S): at 05:34

## 2023-06-11 RX ADMIN — MEN-PHOR 1 APPLICATION(S): .5; .5 LOTION TOPICAL at 17:01

## 2023-06-11 RX ADMIN — RIVAROXABAN 20 MILLIGRAM(S): KIT at 11:38

## 2023-06-11 RX ADMIN — Medication 20 MILLIGRAM(S): at 11:38

## 2023-06-11 RX ADMIN — Medication 650 MILLIGRAM(S): at 08:19

## 2023-06-11 RX ADMIN — Medication 10 MILLIGRAM(S): at 05:35

## 2023-06-11 RX ADMIN — PANTOPRAZOLE SODIUM 40 MILLIGRAM(S): 20 TABLET, DELAYED RELEASE ORAL at 05:35

## 2023-06-11 NOTE — PROGRESS NOTE ADULT - SUBJECTIVE AND OBJECTIVE BOX
***INCOMPLETE***    Leonard Hunter, PGY-2  Internal Medicine  Pager: [phone], [facility]: [phone]    PROGRESS NOTE:     SUBJECTIVE / OVERNIGHT EVENTS: No acute events overnight. ROS negative for fever, chills, cough, SOB, chest pain, nausea, vomiting, diarrhea, constipation, dysuria.    MEDICATIONS  (STANDING):  camphor 0.5%/menthol 0.5% Topical Lotion 1 Application(s) Topical four times a day  chlorhexidine 2% Cloths 1 Application(s) Topical <User Schedule>  furosemide    Tablet 40 milliGRAM(s) Oral daily  ipratropium    for Nebulization 500 MICROGram(s) Nebulizer every 6 hours  pantoprazole    Tablet 40 milliGRAM(s) Oral before breakfast  predniSONE   Tablet 10 milliGRAM(s) Oral daily  Remodulin (Treprostinil) SQ infusion 1 Dose(s) 1 Dose(s) SubCutaneous Continuous Pump  rivaroxaban 20 milliGRAM(s) Oral daily  sildenafil (REVATIO) 20 milliGRAM(s) Oral <User Schedule>  spironolactone 25 milliGRAM(s) Oral <User Schedule>    MEDICATIONS  (PRN):  acetaminophen     Tablet .. 650 milliGRAM(s) Oral every 6 hours PRN Mild Pain (1 - 3), Moderate Pain (4 - 6)  diphenhydrAMINE Injectable 25 milliGRAM(s) IV Push every 8 hours PRN Rash and/or Itching  ipratropium 17 MICROgram(s) HFA Inhaler 1 Puff(s) Inhalation every 6 hours PRN Shortness of Breath/Wheezing      CAPILLARY BLOOD GLUCOSE        I&O's Summary    10 Sudarshan 2023 07:01  -  11 Jun 2023 07:00  --------------------------------------------------------  IN: 860 mL / OUT: 0 mL / NET: 860 mL        PHYSICAL EXAM:  Vital Signs Last 24 Hrs  T(C): 36.6 (11 Jun 2023 03:58), Max: 36.8 (10 Sudarshan 2023 11:44)  T(F): 97.8 (11 Jun 2023 03:58), Max: 98.3 (10 Sudarshan 2023 11:44)  HR: 113 (11 Jun 2023 03:58) (103 - 120)  BP: 101/72 (11 Jun 2023 03:58) (95/71 - 116/78)  BP(mean): --  RR: 18 (11 Jun 2023 03:58) (16 - 18)  SpO2: 97% (11 Jun 2023 03:58) (93% - 97%)    Parameters below as of 11 Jun 2023 03:58  Patient On (Oxygen Delivery Method): room air        GENERAL: No acute distress, well-developed  HEAD:  Atraumatic, Normocephalic  EYES: EOMI, PERRLA, conjunctiva and sclera clear  NECK: Supple, no lymphadenopathy, no JVD  CHEST/LUNG: CTAB; No wheezes, rales, or rhonchi  HEART: Regular rate and rhythm; No murmurs, rubs, or gallops  ABDOMEN: Soft, non-tender, non-distended; normal bowel sounds, no organomegaly  EXTREMITIES:  2+ peripheral pulses b/l, No clubbing, cyanosis, or edema  NEUROLOGY: A&O x 3, no focal deficits  SKIN: No rashes or lesions    LABS:                        14.1   9.07  )-----------( 451      ( 10 Sudarshan 2023 09:56 )             45.6     06-10    136  |  100  |  13  ----------------------------<  99  3.7   |  21<L>  |  1.07    Ca    8.7      10 Sudarshan 2023 09:56  Phos  2.7     06-10  Mg     1.7     06-10    TPro  7.8  /  Alb  4.5  /  TBili  0.9  /  DBili  x   /  AST  13  /  ALT  10  /  AlkPhos  69  06-10                 Leonard Hunter, PGY-2  Internal Medicine  Pager: [phone], J: [phone]    PROGRESS NOTE:     SUBJECTIVE / OVERNIGHT EVENTS: No acute events overnight. Feels well today, though a bit concerned about her fast heart rate. Asymptomatic when tachycardic.    MEDICATIONS  (STANDING):  camphor 0.5%/menthol 0.5% Topical Lotion 1 Application(s) Topical four times a day  chlorhexidine 2% Cloths 1 Application(s) Topical <User Schedule>  furosemide    Tablet 40 milliGRAM(s) Oral daily  ipratropium    for Nebulization 500 MICROGram(s) Nebulizer every 6 hours  pantoprazole    Tablet 40 milliGRAM(s) Oral before breakfast  predniSONE   Tablet 10 milliGRAM(s) Oral daily  Remodulin (Treprostinil) SQ infusion 1 Dose(s) 1 Dose(s) SubCutaneous Continuous Pump  rivaroxaban 20 milliGRAM(s) Oral daily  sildenafil (REVATIO) 20 milliGRAM(s) Oral <User Schedule>  spironolactone 25 milliGRAM(s) Oral <User Schedule>    MEDICATIONS  (PRN):  acetaminophen     Tablet .. 650 milliGRAM(s) Oral every 6 hours PRN Mild Pain (1 - 3), Moderate Pain (4 - 6)  diphenhydrAMINE Injectable 25 milliGRAM(s) IV Push every 8 hours PRN Rash and/or Itching  ipratropium 17 MICROgram(s) HFA Inhaler 1 Puff(s) Inhalation every 6 hours PRN Shortness of Breath/Wheezing      CAPILLARY BLOOD GLUCOSE        I&O's Summary    10 Sudarshan 2023 07:01  -  11 Jun 2023 07:00  --------------------------------------------------------  IN: 860 mL / OUT: 0 mL / NET: 860 mL        PHYSICAL EXAM:  Vital Signs Last 24 Hrs  T(C): 36.6 (11 Jun 2023 03:58), Max: 36.8 (10 Sudarshan 2023 11:44)  T(F): 97.8 (11 Jun 2023 03:58), Max: 98.3 (10 Sudarshan 2023 11:44)  HR: 113 (11 Jun 2023 03:58) (103 - 120)  BP: 101/72 (11 Jun 2023 03:58) (95/71 - 116/78)  RR: 18 (11 Jun 2023 03:58) (16 - 18)  SpO2: 97% (11 Jun 2023 03:58) (93% - 97%)    Parameters below as of 11 Jun 2023 03:58  Patient On (Oxygen Delivery Method): room air        GENERAL: No acute distress, well-developed  HEAD:  Atraumatic, Normocephalic  EYES: EOMI, PERRLA, conjunctiva and sclera clear  NECK: Supple  CHEST/LUNG: CTAB; No wheezes, rales, or rhonchi  HEART: Regular rate and rhythm; No murmurs, rubs, or gallops  ABDOMEN: Soft, non-tender, non-distended; normal bowel sounds, no organomegaly  EXTREMITIES:  2+ peripheral pulses b/l, No clubbing, cyanosis, or edema  NEUROLOGY: A&O x 3, no focal deficits  SKIN: No rashes or lesions    LABS:                        14.1   9.07  )-----------( 451      ( 10 Sudarshan 2023 09:56 )             45.6     06-10    136  |  100  |  13  ----------------------------<  99  3.7   |  21<L>  |  1.07    Ca    8.7      10 Sudarshan 2023 09:56  Phos  2.7     06-10  Mg     1.7     06-10    TPro  7.8  /  Alb  4.5  /  TBili  0.9  /  DBili  x   /  AST  13  /  ALT  10  /  AlkPhos  69  06-10

## 2023-06-11 NOTE — PROGRESS NOTE ADULT - PROBLEM SELECTOR PLAN 1
Likely in the setting of CHF exacerbation vs pHTN exacerbation. May be due to lack of pt tolerance to increasing doses of Rimodulin. TTE w/ RV overload however preserved LV systolic function.   - Titrate O2 for SpO2 > 90%  - Continue Rimodulin  - Lasix 40 mg IV BID, diuresis as per heart failure   - As per pulmonology, may trial increase in Rimodulin while in patient  - Recs appreciated from Heart failure and Pulmonology  - Strict I&Os, daily weights Likely in the setting of CHF exacerbation vs pHTN exacerbation. May be due to lack of pt tolerance to increasing doses of Rimodulin. TTE w/ RV overload however preserved LV systolic function.   - Titrate O2 for SpO2 > 90%  - Continue Rimodulin  - Lasix 40 mg po daily  - As per pulmonology, may trial increase in Rimodulin while in patient  - Recs appreciated from Heart failure and Pulmonology  - Strict I&Os, daily weights

## 2023-06-11 NOTE — PROGRESS NOTE ADULT - PROBLEM SELECTOR PLAN 2
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

## 2023-06-11 NOTE — PROGRESS NOTE ADULT - ATTENDING COMMENTS
41 y/o F w severe Pulmonary Hypertension (On Rimodulin and Xarelto, on home 2L O2), RA thrombus s/p thrombectomy a/w acute on chronic RHF and hypoxia.     ADHF Rv dysfunction  c/w lasix PO 40mg po daily  strict i/o's  C/W jacqueline 25mg po BID     PAH: cont sub q remodulin, sildenafil  Pulm HTN following  pulm-- will trial to increase Remodulin dose and assess for tolerance     Deborah Amador D.O.  Division of Hospital Medicine  Available on MS Teams.

## 2023-06-12 ENCOUNTER — TRANSCRIPTION ENCOUNTER (OUTPATIENT)
Age: 43
End: 2023-06-12

## 2023-06-12 VITALS
OXYGEN SATURATION: 96 % | TEMPERATURE: 98 F | HEART RATE: 100 BPM | SYSTOLIC BLOOD PRESSURE: 105 MMHG | RESPIRATION RATE: 18 BRPM | DIASTOLIC BLOOD PRESSURE: 76 MMHG

## 2023-06-12 LAB
ALBUMIN SERPL ELPH-MCNC: 4.4 G/DL — SIGNIFICANT CHANGE UP (ref 3.3–5)
ALP SERPL-CCNC: 68 U/L — SIGNIFICANT CHANGE UP (ref 40–120)
ALT FLD-CCNC: 10 U/L — SIGNIFICANT CHANGE UP (ref 10–45)
ANION GAP SERPL CALC-SCNC: 15 MMOL/L — SIGNIFICANT CHANGE UP (ref 5–17)
AST SERPL-CCNC: 13 U/L — SIGNIFICANT CHANGE UP (ref 10–40)
BILIRUB SERPL-MCNC: 0.7 MG/DL — SIGNIFICANT CHANGE UP (ref 0.2–1.2)
BUN SERPL-MCNC: 16 MG/DL — SIGNIFICANT CHANGE UP (ref 7–23)
CALCIUM SERPL-MCNC: 8.9 MG/DL — SIGNIFICANT CHANGE UP (ref 8.4–10.5)
CHLORIDE SERPL-SCNC: 99 MMOL/L — SIGNIFICANT CHANGE UP (ref 96–108)
CO2 SERPL-SCNC: 19 MMOL/L — LOW (ref 22–31)
CREAT SERPL-MCNC: 1.01 MG/DL — SIGNIFICANT CHANGE UP (ref 0.5–1.3)
EGFR: 71 ML/MIN/1.73M2 — SIGNIFICANT CHANGE UP
GLUCOSE SERPL-MCNC: 94 MG/DL — SIGNIFICANT CHANGE UP (ref 70–99)
HCT VFR BLD CALC: 45.3 % — HIGH (ref 34.5–45)
HGB BLD-MCNC: 13.9 G/DL — SIGNIFICANT CHANGE UP (ref 11.5–15.5)
MAGNESIUM SERPL-MCNC: 1.8 MG/DL — SIGNIFICANT CHANGE UP (ref 1.6–2.6)
MCHC RBC-ENTMCNC: 20.4 PG — LOW (ref 27–34)
MCHC RBC-ENTMCNC: 30.7 GM/DL — LOW (ref 32–36)
MCV RBC AUTO: 66.6 FL — LOW (ref 80–100)
NRBC # BLD: 0 /100 WBCS — SIGNIFICANT CHANGE UP (ref 0–0)
PHOSPHATE SERPL-MCNC: 2.5 MG/DL — SIGNIFICANT CHANGE UP (ref 2.5–4.5)
PLATELET # BLD AUTO: 401 K/UL — HIGH (ref 150–400)
POTASSIUM SERPL-MCNC: 4.5 MMOL/L — SIGNIFICANT CHANGE UP (ref 3.5–5.3)
POTASSIUM SERPL-SCNC: 4.5 MMOL/L — SIGNIFICANT CHANGE UP (ref 3.5–5.3)
PROT SERPL-MCNC: 7.6 G/DL — SIGNIFICANT CHANGE UP (ref 6–8.3)
RBC # BLD: 6.8 M/UL — HIGH (ref 3.8–5.2)
RBC # FLD: 21.3 % — HIGH (ref 10.3–14.5)
SODIUM SERPL-SCNC: 133 MMOL/L — LOW (ref 135–145)
WBC # BLD: 8.25 K/UL — SIGNIFICANT CHANGE UP (ref 3.8–10.5)
WBC # FLD AUTO: 8.25 K/UL — SIGNIFICANT CHANGE UP (ref 3.8–10.5)

## 2023-06-12 PROCEDURE — 82330 ASSAY OF CALCIUM: CPT

## 2023-06-12 PROCEDURE — 85014 HEMATOCRIT: CPT

## 2023-06-12 PROCEDURE — 85018 HEMOGLOBIN: CPT

## 2023-06-12 PROCEDURE — 82435 ASSAY OF BLOOD CHLORIDE: CPT

## 2023-06-12 PROCEDURE — 83605 ASSAY OF LACTIC ACID: CPT

## 2023-06-12 PROCEDURE — 84484 ASSAY OF TROPONIN QUANT: CPT

## 2023-06-12 PROCEDURE — 96374 THER/PROPH/DIAG INJ IV PUSH: CPT

## 2023-06-12 PROCEDURE — 85610 PROTHROMBIN TIME: CPT

## 2023-06-12 PROCEDURE — 87640 STAPH A DNA AMP PROBE: CPT

## 2023-06-12 PROCEDURE — 85027 COMPLETE CBC AUTOMATED: CPT

## 2023-06-12 PROCEDURE — 82947 ASSAY GLUCOSE BLOOD QUANT: CPT

## 2023-06-12 PROCEDURE — 99233 SBSQ HOSP IP/OBS HIGH 50: CPT | Mod: GC

## 2023-06-12 PROCEDURE — 93306 TTE W/DOPPLER COMPLETE: CPT

## 2023-06-12 PROCEDURE — 99239 HOSP IP/OBS DSCHRG MGMT >30: CPT

## 2023-06-12 PROCEDURE — 82565 ASSAY OF CREATININE: CPT

## 2023-06-12 PROCEDURE — 83735 ASSAY OF MAGNESIUM: CPT

## 2023-06-12 PROCEDURE — 85730 THROMBOPLASTIN TIME PARTIAL: CPT

## 2023-06-12 PROCEDURE — 84295 ASSAY OF SERUM SODIUM: CPT

## 2023-06-12 PROCEDURE — 83880 ASSAY OF NATRIURETIC PEPTIDE: CPT

## 2023-06-12 PROCEDURE — 82803 BLOOD GASES ANY COMBINATION: CPT

## 2023-06-12 PROCEDURE — 36415 COLL VENOUS BLD VENIPUNCTURE: CPT

## 2023-06-12 PROCEDURE — 84100 ASSAY OF PHOSPHORUS: CPT

## 2023-06-12 PROCEDURE — 85025 COMPLETE CBC W/AUTO DIFF WBC: CPT

## 2023-06-12 PROCEDURE — 71045 X-RAY EXAM CHEST 1 VIEW: CPT

## 2023-06-12 PROCEDURE — 84132 ASSAY OF SERUM POTASSIUM: CPT

## 2023-06-12 PROCEDURE — 94640 AIRWAY INHALATION TREATMENT: CPT

## 2023-06-12 PROCEDURE — 87637 SARSCOV2&INF A&B&RSV AMP PRB: CPT

## 2023-06-12 PROCEDURE — 87641 MR-STAPH DNA AMP PROBE: CPT

## 2023-06-12 PROCEDURE — 99285 EMERGENCY DEPT VISIT HI MDM: CPT | Mod: 25

## 2023-06-12 PROCEDURE — 80053 COMPREHEN METABOLIC PANEL: CPT

## 2023-06-12 RX ORDER — FUROSEMIDE 40 MG
1 TABLET ORAL
Qty: 0 | Refills: 0 | DISCHARGE
Start: 2023-06-12

## 2023-06-12 RX ADMIN — Medication 10 MILLIGRAM(S): at 06:11

## 2023-06-12 RX ADMIN — PANTOPRAZOLE SODIUM 40 MILLIGRAM(S): 20 TABLET, DELAYED RELEASE ORAL at 06:11

## 2023-06-12 RX ADMIN — Medication 40 MILLIGRAM(S): at 06:15

## 2023-06-12 RX ADMIN — SPIRONOLACTONE 25 MILLIGRAM(S): 25 TABLET, FILM COATED ORAL at 08:58

## 2023-06-12 RX ADMIN — Medication 500 MICROGRAM(S): at 11:42

## 2023-06-12 RX ADMIN — CHLORHEXIDINE GLUCONATE 1 APPLICATION(S): 213 SOLUTION TOPICAL at 11:41

## 2023-06-12 RX ADMIN — Medication 20 MILLIGRAM(S): at 14:46

## 2023-06-12 RX ADMIN — Medication 20 MILLIGRAM(S): at 06:12

## 2023-06-12 RX ADMIN — RIVAROXABAN 20 MILLIGRAM(S): KIT at 11:42

## 2023-06-12 NOTE — DISCHARGE NOTE NURSING/CASE MANAGEMENT/SOCIAL WORK - NSDCPEFALRISK_GEN_ALL_CORE
For information on Fall & Injury Prevention, visit: [email or web address] OR  [email or web address] OR  https://www.cdc.gov/steadi/patient.html

## 2023-06-12 NOTE — PROGRESS NOTE ADULT - PROBLEM SELECTOR PLAN 4
Diet:: Regular  DVT: Xarelto  Dispo: pending pulm regarding Rimodulin dose increase Diet:: Regular  DVT: Xarelto  Dispo: home w/ rolling walker

## 2023-06-12 NOTE — PROGRESS NOTE ADULT - PROBLEM SELECTOR PROBLEM 3
Right heart failure
Right atrial thrombus
Right heart failure

## 2023-06-12 NOTE — PROGRESS NOTE ADULT - SUBJECTIVE AND OBJECTIVE BOX
Sherman Lafleur MD  Internal Medicine, PGY-2  Please Contact via TEAMS    SUBJECTIVE / OVERNIGHT EVENTS:  - Pt seen and examined at bedside  - FIDENCIO    MEDICATIONS  (STANDING):  camphor 0.5%/menthol 0.5% Topical Lotion 1 Application(s) Topical four times a day  chlorhexidine 2% Cloths 1 Application(s) Topical <User Schedule>  furosemide    Tablet 40 milliGRAM(s) Oral daily  ipratropium    for Nebulization 500 MICROGram(s) Nebulizer every 6 hours  pantoprazole    Tablet 40 milliGRAM(s) Oral before breakfast  predniSONE   Tablet 10 milliGRAM(s) Oral daily  Remodulin (Treprostinil) SQ infusion 1 Dose(s) 1 Dose(s) SubCutaneous Continuous Pump  rivaroxaban 20 milliGRAM(s) Oral daily  sildenafil (REVATIO) 20 milliGRAM(s) Oral <User Schedule>  spironolactone 25 milliGRAM(s) Oral <User Schedule>    MEDICATIONS  (PRN):  acetaminophen     Tablet .. 650 milliGRAM(s) Oral every 6 hours PRN Mild Pain (1 - 3), Moderate Pain (4 - 6)  diphenhydrAMINE Injectable 25 milliGRAM(s) IV Push every 8 hours PRN Rash and/or Itching  ipratropium 17 MICROgram(s) HFA Inhaler 1 Puff(s) Inhalation every 6 hours PRN Shortness of Breath/Wheezing        06-11-23 @ 07:01  -  06-12-23 @ 07:00  --------------------------------------------------------  IN: 410 mL / OUT: 0 mL / NET: 410 mL        PHYSICAL EXAM:  Vital Signs Last 24 Hrs  T(C): 36.7 (12 Jun 2023 05:30), Max: 36.9 (11 Jun 2023 21:29)  T(F): 98 (12 Jun 2023 05:30), Max: 98.5 (11 Jun 2023 21:29)  HR: 98 (12 Jun 2023 05:30) (98 - 116)  BP: 112/70 (12 Jun 2023 05:30) (102/70 - 112/70)  BP(mean): --  RR: 18 (12 Jun 2023 05:30) (18 - 18)  SpO2: 94% (12 Jun 2023 05:30) (90% - 94%)    Parameters below as of 12 Jun 2023 05:30  Patient On (Oxygen Delivery Method): room air        CAPILLARY BLOOD GLUCOSE        I&O's Summary    11 Jun 2023 07:01  -  12 Jun 2023 07:00  --------------------------------------------------------  IN: 410 mL / OUT: 0 mL / NET: 410 mL        CONSTITUTIONAL: NAD, well-developed  RESPIRATORY: Normal respiratory effort; lungs are clear to auscultation bilaterally  CARDIOVASCULAR: Regular rate and rhythm, normal S1 and S2, no murmur/rub/gallop; No lower extremity edema; Peripheral pulses are 2+ bilaterally  ABDOMEN: Nontender to palpation, normoactive bowel sounds, no rebound/guarding; No hepatosplenomegaly  MUSCLOSKELETAL: no clubbing or cyanosis of digits; no joint swelling or tenderness to palpation  PSYCH: A+O to person, place, and time; affect appropriate    LABS:                        14.0   9.24  )-----------( 421      ( 11 Jun 2023 11:06 )             46.0     06-11    135  |  100  |  17  ----------------------------<  98  4.2   |  20<L>  |  1.11    Ca    9.1      11 Jun 2023 11:06  Phos  2.6     06-11  Mg     1.8     06-11    TPro  7.8  /  Alb  4.4  /  TBili  0.7  /  DBili  x   /  AST  14  /  ALT  12  /  AlkPhos  69  06-11                IMAGING:    [X] All pertinent imaging reviewed by me Sherman Lafleur MD  Internal Medicine, PGY-2  Please Contact via TEAMS    SUBJECTIVE / OVERNIGHT EVENTS:  - Pt seen and examined at bedside  - FIDENCIO  - The patient is otherwise doing well today. The patient reports that her Remodulin dose was increased on Friday as per Dr. Yoon's advise. The patient reports good tolerance to the increase in the medication dose. She reports no dizziness nausea vomiting, fevers, chills. The patient reports that she is otherwise prepared to go home today.      MEDICATIONS  (STANDING):  camphor 0.5%/menthol 0.5% Topical Lotion 1 Application(s) Topical four times a day  chlorhexidine 2% Cloths 1 Application(s) Topical <User Schedule>  furosemide    Tablet 40 milliGRAM(s) Oral daily  ipratropium    for Nebulization 500 MICROGram(s) Nebulizer every 6 hours  pantoprazole    Tablet 40 milliGRAM(s) Oral before breakfast  predniSONE   Tablet 10 milliGRAM(s) Oral daily  Remodulin (Treprostinil) SQ infusion 1 Dose(s) 1 Dose(s) SubCutaneous Continuous Pump  rivaroxaban 20 milliGRAM(s) Oral daily  sildenafil (REVATIO) 20 milliGRAM(s) Oral <User Schedule>  spironolactone 25 milliGRAM(s) Oral <User Schedule>    MEDICATIONS  (PRN):  acetaminophen     Tablet .. 650 milliGRAM(s) Oral every 6 hours PRN Mild Pain (1 - 3), Moderate Pain (4 - 6)  diphenhydrAMINE Injectable 25 milliGRAM(s) IV Push every 8 hours PRN Rash and/or Itching  ipratropium 17 MICROgram(s) HFA Inhaler 1 Puff(s) Inhalation every 6 hours PRN Shortness of Breath/Wheezing        06-11-23 @ 07:01  -  06-12-23 @ 07:00  --------------------------------------------------------  IN: 410 mL / OUT: 0 mL / NET: 410 mL        PHYSICAL EXAM:  Vital Signs Last 24 Hrs  T(C): 36.7 (12 Jun 2023 05:30), Max: 36.9 (11 Jun 2023 21:29)  T(F): 98 (12 Jun 2023 05:30), Max: 98.5 (11 Jun 2023 21:29)  HR: 98 (12 Jun 2023 05:30) (98 - 116)  BP: 112/70 (12 Jun 2023 05:30) (102/70 - 112/70)  BP(mean): --  RR: 18 (12 Jun 2023 05:30) (18 - 18)  SpO2: 94% (12 Jun 2023 05:30) (90% - 94%)    Parameters below as of 12 Jun 2023 05:30  Patient On (Oxygen Delivery Method): room air        CAPILLARY BLOOD GLUCOSE        I&O's Summary    11 Jun 2023 07:01  -  12 Jun 2023 07:00  --------------------------------------------------------  IN: 410 mL / OUT: 0 mL / NET: 410 mL        CONSTITUTIONAL: NAD, well-developed  RESPIRATORY: Normal respiratory effort; lungs are clear to auscultation bilaterally  CARDIOVASCULAR: Regular rate and rhythm, normal S1 and S2, no murmur/rub/gallop; No lower extremity edema; Peripheral pulses are 2+ bilaterally  ABDOMEN: Nontender to palpation, normoactive bowel sounds, no rebound/guarding; No hepatosplenomegaly  MUSCLOSKELETAL: no clubbing or cyanosis of digits; no joint swelling or tenderness to palpation  PSYCH: A+O to person, place, and time; affect appropriate    LABS:                        14.0   9.24  )-----------( 421      ( 11 Jun 2023 11:06 )             46.0     06-11    135  |  100  |  17  ----------------------------<  98  4.2   |  20<L>  |  1.11    Ca    9.1      11 Jun 2023 11:06  Phos  2.6     06-11  Mg     1.8     06-11    TPro  7.8  /  Alb  4.4  /  TBili  0.7  /  DBili  x   /  AST  14  /  ALT  12  /  AlkPhos  69  06-11                IMAGING:    [X] All pertinent imaging reviewed by me

## 2023-06-12 NOTE — DISCHARGE NOTE PROVIDER - PROVIDER TOKENS
PROVIDER:[TOKEN:[[id number]],FOLLOWUP:[1 week],ESTABLISHEDPATIENT:[T]],PROVIDER:[TOKEN:[[id number]],FOLLOWUP:[1 week],ESTABLISHEDPATIENT:[T]]

## 2023-06-12 NOTE — PROGRESS NOTE ADULT - ATTENDING COMMENTS
41 y/o F w severe Pulmonary Hypertension (On Rimodulin and Xarelto, on home 2L O2), RA thrombus s/p thrombectomy a/w acute on chronic RHF and hypoxia.     Improved w diuresis and increased Remodulin dose.     D/c home today w/ f/u. D/c time 40 mins.

## 2023-06-12 NOTE — PROGRESS NOTE ADULT - PROVIDER SPECIALTY LIST ADULT
Pulmonology
Heart Failure
Pulmonology
Pulmonology
Internal Medicine

## 2023-06-12 NOTE — DISCHARGE NOTE PROVIDER - HOSPITAL COURSE
HPI:  41 y/o F PMH Pulmonary Hypertension (On Rimodulin and Xarelto, on home 2L O2), RA thrombus s/p thrombectomy, multiple abdominal wall abscesses (last 5/18 s/p I&D), presenting with increased dyspnea on exertion. The patient reports for the past week she's been experiencing increased shortness of breath with minimal exertion, even having SOB going to the bathroom. Her cardiologist Dr Chavis, and her pulmonologist Dr. Yoon have been trying to increase her Rimodulin however this has been limited due to her tolerance of increases. They held off over the past week because the patient was having abdominal pain and nausea/vomiting due to gastroenteritis. Today the patient reported increasing back pain, which usually happens when she has fluid retention. The patient called Dr. Yoon who recommended the patient go to the ED for further evaluation. The patient denies any fevers, chills, nausea, vomiting at this time. She only reports increased dyspnea on exertion.     In the ED, pt symptoms improved w/ increased O2 administration via NC. Heart failure team was called, and patient admitted for further management.     Hospital Course:    While inpatient she was given IV Lasix 40 mg b.i.d. with a significant improvement in her dyspnea on exertion as well as her edema in her gut and her back. She reports subsequent weight loss as well. Due to her improvement in her symptoms she was transition to oral diuretics with outpatient follow up with heart failure. 43 y/o F PMH Pulmonary Hypertension (On Rimodulin and Xarelto, on home 2L O2), RA thrombus s/p thrombectomy, multiple abdominal wall abscesses (last 5/18 s/p I&D), presenting with increased dyspnea on exertion. The patient reports for the past week she's been experiencing increased shortness of breath with minimal exertion, even having SOB going to the bathroom. Her cardiologist Dr Chavis, and her pulmonologist Dr. Yoon have been trying to increase her Remodulin however this has been limited due to her tolerance of increases. They held off over the past week because the patient was having abdominal pain and nausea/vomiting due to gastroenteritis. Today the patient reported increasing back pain, which usually happens when she has fluid retention. The patient called Dr. Yoon who recommended the patient go to the ED for further evaluation.     Admitted for acute on chronic R heart failure. While inpatient she was given IV Lasix 40 mg b.i.d. with a significant improvement in her dyspnea on exertion as well as her edema in her gut and her back. She reported subsequent weight loss as well.     Remodulin infusion rate increased to 46 ng/kg/min, which she tolerated.     D/fritz w follow up.

## 2023-06-12 NOTE — PROGRESS NOTE ADULT - REASON FOR ADMISSION
Dyspnea on Exertion

## 2023-06-12 NOTE — PROGRESS NOTE ADULT - PROBLEM SELECTOR PLAN 1
Likely in the setting of CHF exacerbation vs pHTN exacerbation. May be due to lack of pt tolerance to increasing doses of Rimodulin. TTE w/ RV overload however preserved LV systolic function.   - Titrate O2 for SpO2 > 90%  - Continue Rimodulin  - Lasix 40 mg po daily  - As per pulmonology, may trial increase in Rimodulin while in patient  - Recs appreciated from Heart failure and Pulmonology  - Strict I&Os, daily weights Likely in the setting of CHF exacerbation vs pHTN exacerbation. May be due to lack of pt tolerance to increasing doses of Rimodulin. TTE w/ RV overload however preserved LV systolic function.   - Titrate O2 for SpO2 > 90%  - Continue Rimodulin at increased dose per Dr. Yoon.  - Lasix 40 mg po daily  - Patient will follow up with the heart failure and pulmonology services as an outpatient for further titration of her medication regimen.  - Strict I&Os, daily weights

## 2023-06-12 NOTE — PROGRESS NOTE ADULT - TIME BILLING
Review of patient records, including history, laboratory data, and imaging. Patient evaluation and assessment. Coordination of care.

## 2023-06-12 NOTE — PROGRESS NOTE ADULT - SUBJECTIVE AND OBJECTIVE BOX
Pulmonary Consult Follow up     Interval Events:          REVIEW OF SYSTEMS:  [x] All other systems negative except per HPI   [ ] Unable to assess ROS because ________    OBJECTIVE:  ICU Vital Signs Last 24 Hrs  T(C): 36.8 (12 Jun 2023 11:05), Max: 36.9 (11 Jun 2023 21:29)  T(F): 98.3 (12 Jun 2023 11:05), Max: 98.5 (11 Jun 2023 21:29)  HR: 100 (12 Jun 2023 11:05) (98 - 116)  BP: 105/76 (12 Jun 2023 11:05) (104/72 - 112/70)  BP(mean): --  ABP: --  ABP(mean): --  RR: 18 (12 Jun 2023 11:05) (18 - 18)  SpO2: 96% (12 Jun 2023 11:05) (90% - 96%)    O2 Parameters below as of 12 Jun 2023 11:05  Patient On (Oxygen Delivery Method): room air              06-11 @ 07:01  -  06-12 @ 07:00  --------------------------------------------------------  IN: 410 mL / OUT: 0 mL / NET: 410 mL        PHYSICAL EXAM:  GENERAL: NAD, well-groomed, well-developed  HEAD:  Atraumatic, Normocephalic  EYES: EOMI, conjunctiva and sclera clear  ENMT: Moist mucous membranes  CHEST/LUNG: Clear to auscultation bilaterally; No rales, rhonchi, wheezing, or rubs  HEART: Regular rate and rhythm; No murmurs, rubs, or gallops  ABDOMEN: Nondistended  VASCULAR: No  cyanosis, or edema  SKIN: No rashes or lesions  NERVOUS SYSTEM:  Alert & Oriented X3, Good concentration    HOSPITAL MEDICATIONS:  MEDICATIONS  (STANDING):  camphor 0.5%/menthol 0.5% Topical Lotion 1 Application(s) Topical four times a day  chlorhexidine 2% Cloths 1 Application(s) Topical <User Schedule>  furosemide    Tablet 40 milliGRAM(s) Oral daily  ipratropium    for Nebulization 500 MICROGram(s) Nebulizer every 6 hours  pantoprazole    Tablet 40 milliGRAM(s) Oral before breakfast  predniSONE   Tablet 10 milliGRAM(s) Oral daily  Remodulin (Treprostinil) SQ infusion 1 Dose(s) 1 Dose(s) SubCutaneous Continuous Pump  rivaroxaban 20 milliGRAM(s) Oral daily  sildenafil (REVATIO) 20 milliGRAM(s) Oral <User Schedule>  spironolactone 25 milliGRAM(s) Oral <User Schedule>    MEDICATIONS  (PRN):  acetaminophen     Tablet .. 650 milliGRAM(s) Oral every 6 hours PRN Mild Pain (1 - 3), Moderate Pain (4 - 6)  diphenhydrAMINE Injectable 25 milliGRAM(s) IV Push every 8 hours PRN Rash and/or Itching  ipratropium 17 MICROgram(s) HFA Inhaler 1 Puff(s) Inhalation every 6 hours PRN Shortness of Breath/Wheezing      LABS:  06-12    133<L>  |  99  |  16  ----------------------------<  94  4.5   |  19<L>  |  1.01  06-11    135  |  100  |  17  ----------------------------<  98  4.2   |  20<L>  |  1.11  06-10    136  |  100  |  13  ----------------------------<  99  3.7   |  21<L>  |  1.07    Ca    8.9      12 Jun 2023 10:57  Ca    9.1      11 Jun 2023 11:06  Ca    8.7      10 Sudarshan 2023 09:56  Phos  2.5     06-12  Mg     1.8     06-12    TPro  7.6  /  Alb  4.4  /  TBili  0.7  /  DBili  x   /  AST  13  /  ALT  10  /  AlkPhos  68  06-12  TPro  7.8  /  Alb  4.4  /  TBili  0.7  /  DBili  x   /  AST  14  /  ALT  12  /  AlkPhos  69  06-11  TPro  7.8  /  Alb  4.5  /  TBili  0.9  /  DBili  x   /  AST  13  /  ALT  10  /  AlkPhos  69  06-10    Magnesium, Serum: 1.8 mg/dL (06-12-23 @ 10:57)  Magnesium, Serum: 1.8 mg/dL (06-11-23 @ 11:06)  Magnesium, Serum: 1.7 mg/dL (06-10-23 @ 09:56)    Phosphorus Level, Serum: 2.5 mg/dL (06-12-23 @ 10:57)  Phosphorus Level, Serum: 2.6 mg/dL (06-11-23 @ 11:06)  Phosphorus Level, Serum: 2.7 mg/dL (06-10-23 @ 09:56)                                              13.9   8.25  )-----------( 401      ( 12 Jun 2023 10:57 )             45.3                         14.0   9.24  )-----------( 421      ( 11 Jun 2023 11:06 )             46.0                         14.1   9.07  )-----------( 451      ( 10 Sudarshan 2023 09:56 )             45.6     CAPILLARY BLOOD GLUCOSE         Pulmonary Consult Follow up     Interval Events:  Interval dose increase from 44 to 46 mcg/kg/min of her remodulin on Friday. Tolerated the weekend well without any symptoms. Feels like she is at her baseline.       REVIEW OF SYSTEMS:  [x] All other systems negative except per HPI   [ ] Unable to assess ROS because ________    OBJECTIVE:  ICU Vital Signs Last 24 Hrs  T(C): 36.8 (12 Jun 2023 11:05), Max: 36.9 (11 Jun 2023 21:29)  T(F): 98.3 (12 Jun 2023 11:05), Max: 98.5 (11 Jun 2023 21:29)  HR: 100 (12 Jun 2023 11:05) (98 - 116)  BP: 105/76 (12 Jun 2023 11:05) (104/72 - 112/70)  BP(mean): --  ABP: --  ABP(mean): --  RR: 18 (12 Jun 2023 11:05) (18 - 18)  SpO2: 96% (12 Jun 2023 11:05) (90% - 96%)    O2 Parameters below as of 12 Jun 2023 11:05  Patient On (Oxygen Delivery Method): room air              06-11 @ 07:01  -  06-12 @ 07:00  --------------------------------------------------------  IN: 410 mL / OUT: 0 mL / NET: 410 mL        PHYSICAL EXAM:  GENERAL: NAD, well-groomed, well-developed  HEAD:  Atraumatic, Normocephalic  EYES: EOMI, conjunctiva and sclera clear  ENMT: Moist mucous membranes  CHEST/LUNG: Clear to auscultation bilaterally; No rales, rhonchi, wheezing, or rubs  HEART: Regular rate and rhythm; No murmurs, rubs, or gallops  ABDOMEN: Nondistended  VASCULAR: No  cyanosis, or edema  SKIN: No rashes or lesions  NERVOUS SYSTEM:  Alert & Oriented X3, Good concentration    HOSPITAL MEDICATIONS:  MEDICATIONS  (STANDING):  camphor 0.5%/menthol 0.5% Topical Lotion 1 Application(s) Topical four times a day  chlorhexidine 2% Cloths 1 Application(s) Topical <User Schedule>  furosemide    Tablet 40 milliGRAM(s) Oral daily  ipratropium    for Nebulization 500 MICROGram(s) Nebulizer every 6 hours  pantoprazole    Tablet 40 milliGRAM(s) Oral before breakfast  predniSONE   Tablet 10 milliGRAM(s) Oral daily  Remodulin (Treprostinil) SQ infusion 1 Dose(s) 1 Dose(s) SubCutaneous Continuous Pump  rivaroxaban 20 milliGRAM(s) Oral daily  sildenafil (REVATIO) 20 milliGRAM(s) Oral <User Schedule>  spironolactone 25 milliGRAM(s) Oral <User Schedule>    MEDICATIONS  (PRN):  acetaminophen     Tablet .. 650 milliGRAM(s) Oral every 6 hours PRN Mild Pain (1 - 3), Moderate Pain (4 - 6)  diphenhydrAMINE Injectable 25 milliGRAM(s) IV Push every 8 hours PRN Rash and/or Itching  ipratropium 17 MICROgram(s) HFA Inhaler 1 Puff(s) Inhalation every 6 hours PRN Shortness of Breath/Wheezing      LABS:  06-12    133<L>  |  99  |  16  ----------------------------<  94  4.5   |  19<L>  |  1.01  06-11    135  |  100  |  17  ----------------------------<  98  4.2   |  20<L>  |  1.11  06-10    136  |  100  |  13  ----------------------------<  99  3.7   |  21<L>  |  1.07    Ca    8.9      12 Jun 2023 10:57  Ca    9.1      11 Jun 2023 11:06  Ca    8.7      10 Sudarshan 2023 09:56  Phos  2.5     06-12  Mg     1.8     06-12    TPro  7.6  /  Alb  4.4  /  TBili  0.7  /  DBili  x   /  AST  13  /  ALT  10  /  AlkPhos  68  06-12  TPro  7.8  /  Alb  4.4  /  TBili  0.7  /  DBili  x   /  AST  14  /  ALT  12  /  AlkPhos  69  06-11  TPro  7.8  /  Alb  4.5  /  TBili  0.9  /  DBili  x   /  AST  13  /  ALT  10  /  AlkPhos  69  06-10    Magnesium, Serum: 1.8 mg/dL (06-12-23 @ 10:57)  Magnesium, Serum: 1.8 mg/dL (06-11-23 @ 11:06)  Magnesium, Serum: 1.7 mg/dL (06-10-23 @ 09:56)    Phosphorus Level, Serum: 2.5 mg/dL (06-12-23 @ 10:57)  Phosphorus Level, Serum: 2.6 mg/dL (06-11-23 @ 11:06)  Phosphorus Level, Serum: 2.7 mg/dL (06-10-23 @ 09:56)                                              13.9   8.25  )-----------( 401      ( 12 Jun 2023 10:57 )             45.3                         14.0   9.24  )-----------( 421      ( 11 Jun 2023 11:06 )             46.0                         14.1   9.07  )-----------( 451      ( 10 Sudarshan 2023 09:56 )             45.6     CAPILLARY BLOOD GLUCOSE

## 2023-06-12 NOTE — PROGRESS NOTE ADULT - PROBLEM SELECTOR PROBLEM 1
Right heart failure
Dyspnea on exertion

## 2023-06-12 NOTE — PROGRESS NOTE ADULT - ATTENDING COMMENTS
Patient is a 42F with PMHx asthma (moderate-severe persistent), pulmHTN 2/2 PAH (WHO Group I, Class III) on treprostinil SQ with chronic hypoxemic respiratory failure (2L NC ATC) and hx tachycardia s/p PPM initially presenting to [facility] on 6/7/23 with worsening dyspnea and nausea from pulmonary office for IV diuresis and titration of treprostinil with hemodynamic monitoring.     #pHTN - Follows with Dr. Yoon. Previously on Sildenafil 20mg TID + Remodulin SQ 44ng/kg/min + Prednisone 10mg QD (for asthma mgmt) as well as diuresis with spironolactone 25mg QD + furosemide 40mg QD (5x/week, increased from 20mg TIW a few months ago.)  #Asthma   #Chronic Hypoxemic respiratory failure  - c/w Prednisone 10mg QD (for asthma control), on slow taper as outpatient  - c/w home Sildenafil 20mg TID  - c/w Remodulin SQ (increased from 44ng/kg/min to 46ng/kg/min)   - c/w diuretics    No objection for discharge from pulmonary standpoint    Needs outpatient follow up with Dr. Car Lucas MD  Pulmonary & Critical Care

## 2023-06-12 NOTE — PROGRESS NOTE ADULT - ASSESSMENT
42 y.o. female with pulmonary arterial hypertension, hx of PE, PPM admitted for worsening dyspnea due to possible fluid overload     Plan   - TTE shows no worsening of RV dysfunction   - c/w Lasix  as per HF team   - Strict I/Os   - Daily weights   - Continue increased dose of Remodulin SQ 46 ng/kg/min   - continue sildenafil 20mg q8h   - continue spirolactone 25mg once a day  - continue Xarelto 20mg once a day   - Appreciate heart failure team recommendations - Goal SaO2 of >92%  - PT/OT  - OOB to chair   - No pulmonary contraindication to discharge at this time.     Patient should follow with pulmonology (Dr. Yoon) within 2 weeks of discharge. Please email [email or web address] and include patient's name, , MRN, telephone number, and discharge diagnosis, and allow 24 hours for scheduling. The office is located at [address] [place] [address], [address] [facility]. Number [phone].

## 2023-06-12 NOTE — PROGRESS NOTE ADULT - PROBLEM SELECTOR PLAN 2
Hx of Type 1 and 4 pulmonary hypertension on Rimodulin and Xarelto and sildenafil  - Increase spironolactone as per HF to 25 mg BID  - Continue home sildenafil TID  - Continue Atrovent

## 2023-06-12 NOTE — DISCHARGE NOTE NURSING/CASE MANAGEMENT/SOCIAL WORK - PATIENT PORTAL LINK FT
You can access the FollowMyHealth Patient Portal offered by [facility] by registering at the following website: [email or web address]. By joining [employer]’s FollowMyHealth portal, you will also be able to view your health information using other applications (apps) compatible with our system.

## 2023-06-12 NOTE — DISCHARGE NOTE PROVIDER - NSDCFUSCHEDAPPT_GEN_ALL_CORE_FT
[facility] Physician [facility] [phone]  Scheduled Appointment: 06/20/2023     Vivian Yoon  [facility] Physician [facility]  PULMMED [address] [facility]  Scheduled Appointment: 06/19/2023    [facility]  ELECTROPH [phone]  Scheduled Appointment: 06/20/2023

## 2023-06-12 NOTE — PROGRESS NOTE ADULT - PROBLEM SELECTOR PLAN 3
Likely in the setting of PH  - plan as above
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   - Currently on Xarelto 20 mg daily.
Likely in the setting of PH  - plan as above
Likely in the setting of PH  - plan as above

## 2023-06-12 NOTE — PROGRESS NOTE ADULT - PROBLEM SELECTOR PROBLEM 4
Prophylactic measure
History of sinus bradycardia
Prophylactic measure

## 2023-06-12 NOTE — DISCHARGE NOTE PROVIDER - NSDCCPCAREPLAN_GEN_ALL_CORE_FT
PRINCIPAL DISCHARGE DIAGNOSIS  Diagnosis: Pulmonary hypertension  Assessment and Plan of Treatment: You were admitted for worsening shortness of breath and exertion likely in the setting of heart failure or an exacerbation of your underlying pulmonary hypertension. We gave you IV Lasix which significantly helped and your overall swelling and help you urinate the excess water in your body. You will transition to oral Lasix which you should continue taking at 40 mg per day. We spoke with the pulmonology team who recommended that you increase your Rimodulin dose. We watched you over the weekend on this increased dose which you tolerated well. You were being discharged with close follow-up to the heart failure and pulmonology teams.      SECONDARY DISCHARGE DIAGNOSES  Diagnosis: Pulmonary hypertension  Assessment and Plan of Treatment:

## 2023-06-12 NOTE — PROGRESS NOTE ADULT - ASSESSMENT
43 y/o F PMH Pulmonary Hypertension (On Rimodulin and Xarelto, on home 2L O2), RA thrombus s/p thrombectomy, multiple abdominal wall abscesses (last 5/18 s/p I&D), presenting with AHRF from pulmonary hypertension vs CHF exacerbation.  41 y/o F PMH Pulmonary Hypertension (On Rimodulin and Xarelto, on home 2L O2), RA thrombus s/p thrombectomy, multiple abdominal wall abscesses (last 5/18 s/p I&D), presenting with AHRF from pulmonary hypertension exacerbation, s/p IV diuresis and increase in Rimodulin dose, pending d/c home.

## 2023-06-12 NOTE — DISCHARGE NOTE PROVIDER - CARE PROVIDER_API CALL
Vivian Yoon  Pulmonary Disease  [address], [address]  [place] [address]  Phone: [phone]  Fax: [phone]  Established Patient  Follow Up Time: 1 week    Paramjit Chavis  Adv Heart Fail Trnsplnt Cardio  [address] [place] [id number]  Phone: [phone]  Fax: [phone]  Established Patient  Follow Up Time: 1 week

## 2023-06-12 NOTE — DISCHARGE NOTE PROVIDER - NSDCMRMEDTOKEN_GEN_ALL_CORE_FT
Atrovent 500 mcg/2.5 mL inhalation solution: 2.5 milliliter(s) inhaled every 6 hours, As Needed  Atrovent HFA 17 mcg/inh inhalation aerosol: puff(s) inhaled every 6 hours, As Needed  furosemide 40 mg oral tablet: 1 tab(s) orally once a day  omeprazole 40 mg oral delayed release capsule: 1 cap(s) orally once a day  predniSONE 10 mg oral tablet: 1 tab(s) orally once a day  Remodulin 5 mg/mL injectable solution: patient is taking 44ng/kg/min via her own SQ PUMP  rivaroxaban 20 mg oral tablet: 1 tab(s) orally once a day (before a meal)  Rollator: Please dispense 1x rollator  sildenafil 20 mg oral tablet: 1 tab(s) orally every 8 hours   spironolactone 25 mg oral tablet: 1 tab(s) orally once a day   Atrovent 500 mcg/2.5 mL inhalation solution: 2.5 milliliter(s) inhaled every 6 hours, As Needed  Atrovent HFA 17 mcg/inh inhalation aerosol: puff(s) inhaled every 6 hours, As Needed  furosemide 40 mg oral tablet: 1 tab(s) orally once a day  omeprazole 40 mg oral delayed release capsule: 1 cap(s) orally once a day  predniSONE 10 mg oral tablet: 1 tab(s) orally once a day  Remodulin 5 mg/mL injectable solution: patient is taking 46ng/kg/min via her own SQ PUMP  rivaroxaban 20 mg oral tablet: 1 tab(s) orally once a day (before a meal)  Rollator: Please dispense 1x rollator  sildenafil 20 mg oral tablet: 1 tab(s) orally every 8 hours   spironolactone 25 mg oral tablet: 1 tab(s) orally once a day

## 2023-06-19 ENCOUNTER — APPOINTMENT (OUTPATIENT)
Dept: PULMONOLOGY | Facility: CLINIC | Age: 43
End: 2023-06-19

## 2023-06-20 ENCOUNTER — APPOINTMENT (OUTPATIENT)
Dept: ELECTROPHYSIOLOGY | Facility: CLINIC | Age: 43
End: 2023-06-20
Payer: MEDICAID

## 2023-06-23 ENCOUNTER — NON-APPOINTMENT (OUTPATIENT)
Age: 43
End: 2023-06-23

## 2023-06-23 ENCOUNTER — APPOINTMENT (OUTPATIENT)
Dept: ELECTROPHYSIOLOGY | Facility: CLINIC | Age: 43
End: 2023-06-23
Payer: MEDICAID

## 2023-06-23 PROCEDURE — 93296 REM INTERROG EVL PM/IDS: CPT

## 2023-06-23 PROCEDURE — 93294 REM INTERROG EVL PM/LDLS PM: CPT

## 2023-06-27 ENCOUNTER — NON-APPOINTMENT (OUTPATIENT)
Age: 43
End: 2023-06-27

## 2023-06-27 ENCOUNTER — APPOINTMENT (OUTPATIENT)
Dept: PULMONOLOGY | Facility: CLINIC | Age: 43
End: 2023-06-27
Payer: MEDICAID

## 2023-06-27 VITALS
WEIGHT: 223 LBS | OXYGEN SATURATION: 91 % | RESPIRATION RATE: 15 BRPM | HEIGHT: 65 IN | TEMPERATURE: 97 F | SYSTOLIC BLOOD PRESSURE: 104 MMHG | DIASTOLIC BLOOD PRESSURE: 73 MMHG | HEART RATE: 116 BPM | BODY MASS INDEX: 37.15 KG/M2

## 2023-06-27 PROCEDURE — 99215 OFFICE O/P EST HI 40 MIN: CPT

## 2023-06-27 NOTE — DISCUSSION/SUMMARY
[FreeTextEntry1] : ----Assessment and Plan--------42 year-old lady with PULMONARY ARTERIAL HYPERTENSION S/P PPM FOR TACHYCARDIA AT [place] ON REMODULIN - S/P LEFT BREAST BIOPSY \par  doing well on remodulin 38 ng ----- ON XARELTO, LASIX AND ALDACTONE -------LOW DOSE PREDNISONE -----is following up with at [facility] for transplant listing-----final decision after the  and psych evaluation\par \par JAN 2023---- ABDOMINAL WALL ABSCESS-----needED surgical drainage-doneE\par MARCH 2023   --- pain abdomen rule out pancreatitis\par \par 1. PULMONARY ART HTN   ---- WHO GROUP I FUNCTIONAL CLASS III ----\par  [AS PER DR FREEMAN WHO DID PULM ANGIO SHE HAS FEATURES OF GROUP I WITH SOME   EVIDENCE OF DISTAL CLOTS] \par  She is on Remodulin 46 NG/KG/MIN  ---\par  Continue  Sildenafil 20mg qd, Xarelto, ON Lasix 20 mg 5 TIMES A WEEK AND ALDACTONE 25 MG \par \par 2. Asthma -. use  ipratropium and levalbuterol and budesonide. Refuses use of DUPIXENT at this time as she reports her allergies are "not that bad". Has has been previously  steroid dependent likely contributing to her weight. She is hesitant to start biologic injections. \par Has discontinued Flonase and Dymysta nasal spray \par \par \par 3. High BMI (41.9 kg/m2)\par   Precluding lung transplant\par   Referred to nutritionist------ extensive nutritional advice given--- she promises to follow-up on thAT\par   Weight loss program advised\par \par 4. Oxygen medically necessary given severe Pulm Htn, anemia and h/o nocturnal desaturation which could worsen Pulm HTN. Advised oxygen 2 lpm x 24hrs\par \par 5. She is on Xarelto---will be on lifelong anticoagulation---\par \par 6) She has features of ERICA- including sleep disturbance, nocturnal desat, and excessive fatigue- HST was negative for ERICA in 2014 but had desaturation. Will try to repeat HST at next visit\par \par 8. Follows w/[facility] Team----  ---Also consulted with Dr Lou at [facility]---\par   As per patient, Dr Lou feels she is not a candidate at this time because of good health\par   Although Dr. Lou advised needs to lose wt to be considered for transplant listing in future\par \par 9. ABDOMINAL pain- check labs (drawn in our office today) zofran for nausea\par ---dietray changes recommended - - -\par 10- - f/u in 3m\par \par Vivian Yoon MD, [facility] \par

## 2023-06-27 NOTE — HISTORY OF PRESENT ILLNESS
[Never] : never [TextBox_4] : --42  female----------Patient is here for follow up of her pulmonary htn. ------------------INITIALLY  REFD  WITH   ECHO [DONE  OUTSIDE ] ELEVATED PASP  DILATED RV AND RA---------  HAS BEEN TOLD IN THE PAST SHE HAS ??? ASTHMA-----long-standing history of dyspnea on exertion-----------------has baseline tachycardia ----...--s. No history of liver dysfunction , collagen vascular disorder or chronic thromboembolic disease. I would classify her dyspnea as WHO  FUNCTIONAL CLASS III-----------no calf tenderness----------SLEEP STUDY SHOWS AHI 0.6 but T 90  < 35 %-----DIAGNOSED  AS PAH ---------WAS ON ADMAPAS  NOW ON   SQ REMODULIN  [ SINCE MAY 2018]  ---S/P PACEMAKER PLACEMENT AT [facility]  AND ON METOPROLOL-----\par \par \par 2/3/2022 tested positive on home covid test 2/2/2022- developed shortness of breath, nausea and vomitting- son is positive\par She is vaccinated for covid but not boosted\par Afebrile, drinking but not eating well. O2 sat 94% room air rest- 92% room air w/walk\par \par 2/7/2022 covid pcr negative - did not get MAB\par still with SOUZA - on prednisone with taper\par \par 4/2022 6mwt o2 sat 95% to 94% on oxygen 69meters (135m) stopped d/t severe SOB and elevated HR\par \par 4/15/2022  pulm htn - on remodulin 36 ng (having jaw pain sometimes), sildenafil 10 mg qd, furosemide 20 mg 5x weekly which helped w/edema. She is also having palpitations- has not yet followed up with DR Chavis in cardiology.\par Asthma-occas wheezing- awaiting start of dupixent\par Up to date w/covid vac- declines influenza vac. s/p consult with lung transplant team but needs to lower BMI before being considered- pt has been referred to nutritionist but has not scheduled appt\par \par \par 5/31/22- pulm htn - on remodulin 36 ng SQ (having jaw pain sometimes), sildenafil 10 mg qd, and diuretics daily- doing well from pulm perspectives\par Asthma currently under control- declining biologic therapy\par Was following lung  transplant team but currently on hold d/t BMI\par \par \par 7/11/22- pulm htn--on remodulin 36 ng SQ (having jaw pain sometimes), sildenafil 10 mg qd and diuretics daily [LASIX WO MG---XARELTO \par was in the hospital at [facility]-- given Lasix --- we will slowly increase her dose of Remodulin\par \par 7/22/2022 pulm htn- breathing improved on higher dose of remodulin 40ng, having some diarrhea. Palpitations improved\par \par 9/15 /2022 ----s/p hospitalization for cardiogenic shock due to RV failure with markers of end organ dysfunction,\par started on  as well as Marcela. CTA negative for PE but TTE revealed RA thrombus\par with concern for clot in transit. She underwent catheter directed partial\par thrombus aspiration 8/31, course complicated by L fem pseudoaneurysm for which\par she received thrombin injection on 9/3. Course further complicated by S.\par Lugdunensis bacteremia on BCx from 8/29 s/p drainage of abscess at prior----on IV antibiotics as per ID\par Remodulin subcutaneous access site.--42 ng/kg/min , also on sildenafil 20 mg 3 times daily\par ----\par \par \par SEPT 27 2022-----feels much better on subcu Remodulin 44 ng/kg/min and sildenafil 20 mg 3 times daily on diuretics-on anticoagulation-------- needs to see vascular for left femoral pseudoaneurysm--- H/O---S.Lugdunensis bacteremia on BCx from 8/29 s/p drainage of abscess at prior----on IV antibiotics as per ID --------ALSO HAS Lleft   fem pseudoaneurysm---needs follow up by  vasular ---- --received flu vaccine today------------------------\par \par October 2022: Telehealth Visit via teledoc\par S/P hospitalization for cardiogenic shock c/b bacteremia on IV antibiotics. Followed by ID (Dr. Grover) for labs, antibiotics now d/c'd (10/6)  getting follow up labs w/ID (results pending from 10/19)\par Pulm htn -She reports use of Remodulin 44 ng/kg/min, tolerating new dose well  and Sildenafil (20 mg t.i.d PO) No resp complaints, no palpitations, follows cardiology Dr Paramjit Chavis. On diuretics, xarelto, O2 as needed\par s/p lung transplant eval- was told she was "too well" to be listed and needs to work on getting her BMI down\par \par jan 2022--developed abdominal wall abscess--needs surgical drainage--------subcu Remodulin 44 ng/kg/min and sildenafil 20 mg 3 times daily on diuretics-on anticoagulation--\par \par mario    10 2023--- recent admission to the hospital for abscess on abdominal wall which was drained and received antibiotics and it has healed----------------subcu Remodulin 44 ng/kg/min and sildenafil 20 mg 3 times daily on diuretics-on anticoagulation--I would like to increase Remodulin to 45 ng/kg/min--------- having some URI symptoms we will send a nasal swab continue diuretics----\par \par \par 3/2023---seems stable  pulm htn -remains on remodulin 44ng and sildenafil, diuretics per heart failure Dr Chavis\par c/o worsening SOUZA and nausea x 1week, chroic diarrhea which she attributes to remodulin, and lower abd pain ///??? pancreatitis\par Tacchycardia has improved, on continuous oxygen\par Accompanied by \par \par \par 5/2023 accompanied by \par pulm htn -remains on remodulin 44ng and sildenafil, diuretics per heart failure Dr Chavis\par old remodulin site healing w/minimal drainage (previously treated w/antibiotics)\par \par c/o nausea, lightheadedness, headache\par \par june 2023--------accompanied by \krishna perez htn -remains on remodulin 46ng and sildenafil, diuretics per heart failure Dr Chavis\krishna -feesl btter--- less  nausea,---will increase   Remodulin to to 48 ng/kg/min [TextBox_100] : 1/2014 [TextBox_108] : 0.9 [TextBox_112] : 35.3

## 2023-06-28 ENCOUNTER — APPOINTMENT (OUTPATIENT)
Dept: HEART FAILURE | Facility: CLINIC | Age: 43
End: 2023-06-28

## 2023-06-28 NOTE — HISTORY OF PRESENT ILLNESS
[FreeTextEntry1] : Ms. Conway is a 43 y/o F with asthma, RVSD in the setting of pulmonary hypertension (group I and IV; on intermittent home O2 2LNC) with hx of PE (on Xarelto) and evidence of distal emboli (on subcutaneous remodulin), RA thrombus s/p partial aspiration (22;on AC), prior AVNRT ablation (), bradycardia s/p dual chamber PPM who presents for follow up. Accompanied by her daughter. Referred by Dr. Yoon. Previously followed by Dr. Garcia.\par \par Since last visit in September, has been doing well. Of note, had one admission -22 for abdominal discomfort and nausea/emesis, found to be in cardiogenic shock due to RV failure with markers of end organ dysfunction. Had required  and Marcela. CTA negative for PE but TTE revealed RA thrombus with concern for clot in transit. She underwent catheter directed partial thrombus aspiration , course complicated by L fem pseudoaneurysm for which she received thrombin injection on 9/3. Course further complicated by S.Lugdunensis bacteremia on BCx from  s/p drainage of abscess at prior Remodulin subcutaneous access site.\par \par Her pulmonary HTN workup has included the following: \par 2022- CTA negative for PE  but TTE revealed RA thrombus with concern for clot in transit S/P partial aspiration\par  - CTA - negative for PE; mod pericardial effusion; no lymphadenopathy\par  - negative for HIV, SCOTT\par 6/15 - low probability V/Q scan but heterogeneous distribution \par  - anti-DsDNA, anti-SSA negative\par \par Currently, she is on Remodulin infusion at 42 mcg/kg/min continuous with plan to go up to 44 mcg/kg/min as per pulm note. Gets occasional headaches, diarrhea and jaw pain. \par \par Has been taking furosemide 20 mg QOD but reports suboptimal response.\par \par She was being evaluated for lung transplant at [place] and follows there with Dr. Martinez but now being considered at [facility]. \par \par States weight has been approx 215 pounds. BMI has reduced to 35. She is adhering to low salt diet and has decreased carbs. Limits fluid to less than 2 liters/day. \par \par She uses continuous oxygen at 2 L (for 2 years) at rest but recently using less. Walks 3 room length without dyspnea. She walks up to 1/2 block but states her heart rate goes up with activity. She has 5 steps to elevator at home which she has trouble with. Sleeps with one pillow. States that she feels bloated in hands when she is retaining fluid but  denies lower extremity edema. \par \par Has occasional dizziness. Had syncope several years ago and had PPM implanted in 2019. No further syncope. No recent dizziness/LH. \par \par She had Pfizer vaccine x 2 and did not get booster since had mild reaction with last vaccine. She has not had Covid.

## 2023-06-28 NOTE — PHYSICAL EXAM
[Well Nourished] : well nourished [Obese] : obese [Normal Conjunctiva] : normal conjunctiva [Normal S1, S2] : normal S1, S2 [Clear Lung Fields] : clear lung fields [No Edema] : no edema [Normal] : alert and oriented, normal memory [de-identified] : JVP 8-10 cm with mild HJR  [de-identified] : Left chest PPM [de-identified] : not using  2 L nasal cannula continuous [de-identified] : softly distended [de-identified] : slow gait, useds wheelchair for long distances, discomfort right shoulder with ROM [de-identified] : no swelling of hands or lower extremities

## 2023-06-28 NOTE — CARDIOLOGY SUMMARY
[de-identified] : \par 12/14/22 unchanged\par 9/21/22 NSR 87, RBBB, RAD, NSST\par 6/10/22 sinus tachycardia 109, RAD, TAJ, RBBB, TWI [de-identified] : \par 8/2022  TTE revealed RA thrombus with concern for clot in transit. She underwent catheter directed partial\par thrombus aspiration 8/31\par \par Limited TTE 8/31 @10:30: LVIDd 3.2 cm, hyperdynamic LV, new echogenic mass in RA 1.8 cm x 3.1 cm likely representing clot in transit (not seen on TTE from 8/29), RVE with decreased RV systolic function, est PASP 60 mmHg\par \par TTE 8/29/22: LVIDd 1.3 cm, normal LVSF, unable to asess LVEF given small cavity, flattening of interventricular septum consistent with RV overload, RVE with decreased systolic function (RV measuring 1.1 cm), severe TAJ (IAS bowed to L), PI end diastolic pressures 27 mmHg hence PA end diastolic pressure is at least 57 mmHg (TAPSE 1.1 cm), severe TR, est PASP 96 mmHg\par \par 11/2/20 - LVEF 65-70%, LIVDd 2.6, RV enlargement with decreased RV systolic function, mild-mod TR RVSP 34 (likely underestimated), D shaped septum in systole/diastole, mod pulmonic insufficiency, IVC small and collapsed  [de-identified] : \par 9/1/22 Cath for venous thrombectomy right arm \par RHC 7/23/20 RA 4, RV 80/4, PA 80/40/57, PCWP 22, LVEDP 4; CO/CI 4/2.0; PVR 11 (using EDP 13).

## 2023-06-28 NOTE — ASSESSMENT
[FreeTextEntry1] : 43 y/o F with asthma,RVSD secondary to  pulmonary hypertension (group I and IV) with hx of PE and evidence of distal emboli, previously on riociquat, now on subcutaneous remodulin, prior AVNRT ablation (5/20), bradycardia s/p dual chamber PPM who presents for follow-up. Appears mildly hypervolemic with poor response to lasix 20 mg. Ultimately would benefit from lung transplant which she is working towards becoming a candidate with reduction in her BMI; per patient being delayed as she's been doing reasonably well. \par \par 1. Pulmonary HTN group I and IV)\par - on continuous remodulin at 42 mcg/kg/min to go up to 44 mcg/kg/min on 9/22 as per pulm\par - follow up with Dr. Yoon\par - may benefit from Ozempic to assist with weight loss; Dr. Yoon's team to address\par \par 2. Right  heart failure with normal LVEF\par - appears mildly overloaded\par - on furosemide 20 mg three times a week on M-W-F; instructed to take 40 mg every other day and as needed for weight gain of 2-3 lbs in 2-3 days\par - continue spironolactone 25 mg daily\par - limit salt to less than 2000 mg per day\par -continue calorie reduction to decrease BMI\par - repeat TTE \par \par 3. RA thrombus with  Hx of PE and evidence of distal emboli\par - underwent catheter directed partial thrombus aspiration 8/31/22, course complicated by L fem pseudoaneurysm for which she received thrombin injection on 9/3. \par - continue Xarelto 20 mg daily\par - no s/s of bleeding\par \par 4. prior AVNRT ablation (5/20), bradycardia s/p dual chamber PPM\par - PPM checks to [facility], requests a new [employer] card\par - interrogated today with  < 1%, battery remaining 11 years \par - continue latitude remote monitor\par \par Follow up in office in 3 months, will obtain recent labs.   [facility]

## 2023-07-19 ENCOUNTER — APPOINTMENT (OUTPATIENT)
Dept: PULMONOLOGY | Facility: CLINIC | Age: 43
End: 2023-07-19

## 2023-07-19 DIAGNOSIS — Z87.898 PERSONAL HISTORY OF OTHER SPECIFIED CONDITIONS: ICD-10-CM

## 2023-07-19 DIAGNOSIS — Z87.09 PERSONAL HISTORY OF OTHER DISEASES OF THE RESPIRATORY SYSTEM: ICD-10-CM

## 2023-07-19 DIAGNOSIS — Z95.0 PRESENCE OF CARDIAC PACEMAKER: ICD-10-CM

## 2023-07-20 RX ORDER — DOXYCYCLINE 100 MG/1
100 CAPSULE ORAL
Qty: 20 | Refills: 0 | Status: ACTIVE | COMMUNITY
Start: 2019-04-16 | End: 1900-01-01

## 2023-07-21 NOTE — REASON FOR VISIT
[Follow-Up] : a follow-up visit [Pulmonary Hypertension] : pulmonary hypertension [TextBox_44] : pre lung transplant  [TextBox_13] : Dr Chavis

## 2023-07-21 NOTE — HISTORY OF PRESENT ILLNESS
[TextBox_4] : 43 year old female with history of asthma, dyspnea on exertion, pulmonary htn,  bradycardia (s/p pacemaker), was initially evaluated for lung transplant in 10/21- was advised to loose weight, with goal of 170-180 lbs, pulm rehab referral, recommended sleep study. Then was  was  admitted to the hospital for cardiogenic shock due to RV failure with markers of end organ dysfunction in *****. Started on  as well as Marcela. CTA negative for PE but TTE revealed RA thrombus. She underwent catheter directed partial thrombus aspiration 8/31?20****, course complicated by L fem pseudoaneurysm. She received thrombin injection on 9/3/20****. Course further complicated by S. Lugdunensis bacteremia on BCx from 8/29/20** s/p drainage of abscess and IV antibiotics.  \par \par Patients reports doing *****since last visit\par \par \par When/how diagnosed with primary pulm dx?\par \par \par PSH:\par \par Oxygen requirement *** at rest *** on exertion *** at night\par \par Quality of life:\par \par Functional status:\par [] performs activities of daily living with NO assistance\par [] performs activities of daily living with SOME assistance\par [] performs activities of daily living with TOTAL assistance\par \par Exposures:\par \par Prior hospitalizations, ICU admission or intubations:\par \par Hx covid infection, blood transfusions or pregnancies:\par \par Health maintenance/vaccines:\par COVID vax: Pfizer x2\par Family History:\par \par Social History: Lives with *********\par Lives with:\par ETOH/tobacco use:\par \par DATA REVIEWED:\par \par PFTS \par \par FVC:\par \par FEV1:\par \par TLC:\par \par DLCO:\par \par \par \par 6MWT: 66 meters\par \par \par CT CHEST \par \par \par ECHO\par 6/8/23 CONCLUSIONS:\par  1. Small left ventricular cavity size. The left ventricular wall thickness is normal. Right ventricular pressure overload. The left ventricular systolic function is normal with an ejection fraction of 64 % by Holcomb's method of disks. There are no regional wall motion abnormalities seen.\par  2. Moderately enlarged right ventricular cavity size, increased wall thickness and moderately reduced systolic function. The tricuspid annular plane systolic excursion (TAPSE) is 2.1 cm (normal >=1.7 cm).\par  3. Device lead is visualized in the right heart.\par  4. Trace pericardial effusion noted adjacent to the right atrium.\par  5. Compared to the transthoracic echocardiogram performed on 11/2/2020 there have been no significant interval changes.\par \par RHC/LHC\par \par \par \par

## 2023-07-21 NOTE — ASSESSMENT
[FreeTextEntry1] : 43 year old female with history of asthma, dyspnea on exertion, pulmonary htn,  bradycardia (s/p pacemaker), [age] year old female with history of pulmonary HTN was initially evaluated for lung transplant in 10/21- was advised to loose weight, with goal of 170-180 lbs. current weight ***lbs/ BMI***\par \par #lung transplant evaluation\par -\par -\par \par \par \par \par FOLLOW UP\par -\par \par \par \par RTC in \par \par All questions answered, used teach back method, patient verbalized understanding. \par \par Above discussed with Dr. Randall\par

## 2023-08-12 ENCOUNTER — TRANSCRIPTION ENCOUNTER (OUTPATIENT)
Age: 43
End: 2023-08-12

## 2023-08-12 ENCOUNTER — INPATIENT (INPATIENT)
Facility: HOSPITAL | Age: 43
LOS: 4 days | Discharge: HOME | DRG: 287 | End: 2023-08-17
Attending: HOSPITALIST | Admitting: HOSPITALIST
Payer: MEDICAID

## 2023-08-12 VITALS
RESPIRATION RATE: 16 BRPM | HEART RATE: 97 BPM | WEIGHT: 250 LBS | DIASTOLIC BLOOD PRESSURE: 82 MMHG | HEIGHT: 65 IN | SYSTOLIC BLOOD PRESSURE: 120 MMHG | OXYGEN SATURATION: 94 % | TEMPERATURE: 98 F

## 2023-08-12 DIAGNOSIS — Z29.9 ENCOUNTER FOR PROPHYLACTIC MEASURES, UNSPECIFIED: ICD-10-CM

## 2023-08-12 DIAGNOSIS — R06.02 SHORTNESS OF BREATH: ICD-10-CM

## 2023-08-12 DIAGNOSIS — I50.810 RIGHT HEART FAILURE, UNSPECIFIED: ICD-10-CM

## 2023-08-12 DIAGNOSIS — Z95.0 PRESENCE OF CARDIAC PACEMAKER: Chronic | ICD-10-CM

## 2023-08-12 DIAGNOSIS — Z98.51 TUBAL LIGATION STATUS: Chronic | ICD-10-CM

## 2023-08-12 DIAGNOSIS — J45.909 UNSPECIFIED ASTHMA, UNCOMPLICATED: ICD-10-CM

## 2023-08-12 LAB
ALBUMIN SERPL ELPH-MCNC: 4.3 G/DL — SIGNIFICANT CHANGE UP (ref 3.3–5)
ALP SERPL-CCNC: 64 U/L — SIGNIFICANT CHANGE UP (ref 40–120)
ALT FLD-CCNC: 8 U/L — LOW (ref 10–45)
ANION GAP SERPL CALC-SCNC: 15 MMOL/L — SIGNIFICANT CHANGE UP (ref 5–17)
ANISOCYTOSIS BLD QL: SLIGHT — SIGNIFICANT CHANGE UP
AST SERPL-CCNC: 10 U/L — SIGNIFICANT CHANGE UP (ref 10–40)
BASOPHILS # BLD AUTO: 0.36 K/UL — HIGH (ref 0–0.2)
BASOPHILS NFR BLD AUTO: 3.4 % — HIGH (ref 0–2)
BILIRUB SERPL-MCNC: 0.7 MG/DL — SIGNIFICANT CHANGE UP (ref 0.2–1.2)
BUN SERPL-MCNC: 16 MG/DL — SIGNIFICANT CHANGE UP (ref 7–23)
CALCIUM SERPL-MCNC: 8.9 MG/DL — SIGNIFICANT CHANGE UP (ref 8.4–10.5)
CHLORIDE SERPL-SCNC: 106 MMOL/L — SIGNIFICANT CHANGE UP (ref 96–108)
CO2 SERPL-SCNC: 18 MMOL/L — LOW (ref 22–31)
CREAT SERPL-MCNC: 0.95 MG/DL — SIGNIFICANT CHANGE UP (ref 0.5–1.3)
DACRYOCYTES BLD QL SMEAR: SLIGHT — SIGNIFICANT CHANGE UP
EGFR: 76 ML/MIN/1.73M2 — SIGNIFICANT CHANGE UP
ELLIPTOCYTES BLD QL SMEAR: SLIGHT — SIGNIFICANT CHANGE UP
EOSINOPHIL # BLD AUTO: 0.27 K/UL — SIGNIFICANT CHANGE UP (ref 0–0.5)
EOSINOPHIL NFR BLD AUTO: 2.6 % — SIGNIFICANT CHANGE UP (ref 0–6)
GLUCOSE SERPL-MCNC: 77 MG/DL — SIGNIFICANT CHANGE UP (ref 70–99)
HCG SERPL-ACNC: <2 MIU/ML — SIGNIFICANT CHANGE UP
HCT VFR BLD CALC: 44.3 % — SIGNIFICANT CHANGE UP (ref 34.5–45)
HGB BLD-MCNC: 13.5 G/DL — SIGNIFICANT CHANGE UP (ref 11.5–15.5)
HOWELL-JOLLY BOD BLD QL SMEAR: PRESENT — SIGNIFICANT CHANGE UP
LYMPHOCYTES # BLD AUTO: 2.46 K/UL — SIGNIFICANT CHANGE UP (ref 1–3.3)
LYMPHOCYTES # BLD AUTO: 23.3 % — SIGNIFICANT CHANGE UP (ref 13–44)
MANUAL SMEAR VERIFICATION: SIGNIFICANT CHANGE UP
MCHC RBC-ENTMCNC: 20.3 PG — LOW (ref 27–34)
MCHC RBC-ENTMCNC: 30.5 GM/DL — LOW (ref 32–36)
MCV RBC AUTO: 66.7 FL — LOW (ref 80–100)
MICROCYTES BLD QL: SLIGHT — SIGNIFICANT CHANGE UP
MONOCYTES # BLD AUTO: 0.55 K/UL — SIGNIFICANT CHANGE UP (ref 0–0.9)
MONOCYTES NFR BLD AUTO: 5.2 % — SIGNIFICANT CHANGE UP (ref 2–14)
NEUTROPHILS # BLD AUTO: 6.91 K/UL — SIGNIFICANT CHANGE UP (ref 1.8–7.4)
NEUTROPHILS NFR BLD AUTO: 65.5 % — SIGNIFICANT CHANGE UP (ref 43–77)
NT-PROBNP SERPL-SCNC: 336 PG/ML — HIGH (ref 0–300)
PLAT MORPH BLD: NORMAL — SIGNIFICANT CHANGE UP
PLATELET # BLD AUTO: 413 K/UL — HIGH (ref 150–400)
POIKILOCYTOSIS BLD QL AUTO: SLIGHT — SIGNIFICANT CHANGE UP
POLYCHROMASIA BLD QL SMEAR: SLIGHT — SIGNIFICANT CHANGE UP
POTASSIUM SERPL-MCNC: 3.9 MMOL/L — SIGNIFICANT CHANGE UP (ref 3.5–5.3)
POTASSIUM SERPL-SCNC: 3.9 MMOL/L — SIGNIFICANT CHANGE UP (ref 3.5–5.3)
PROT SERPL-MCNC: 7.1 G/DL — SIGNIFICANT CHANGE UP (ref 6–8.3)
RAPID RVP RESULT: SIGNIFICANT CHANGE UP
RBC # BLD: 6.64 M/UL — HIGH (ref 3.8–5.2)
RBC # FLD: 19.3 % — HIGH (ref 10.3–14.5)
RBC BLD AUTO: SIGNIFICANT CHANGE UP
SARS-COV-2 RNA SPEC QL NAA+PROBE: SIGNIFICANT CHANGE UP
SODIUM SERPL-SCNC: 139 MMOL/L — SIGNIFICANT CHANGE UP (ref 135–145)
TROPONIN T, HIGH SENSITIVITY RESULT: 7 NG/L — SIGNIFICANT CHANGE UP (ref 0–51)
WBC # BLD: 10.55 K/UL — HIGH (ref 3.8–10.5)
WBC # FLD AUTO: 10.55 K/UL — HIGH (ref 3.8–10.5)

## 2023-08-12 PROCEDURE — 71045 X-RAY EXAM CHEST 1 VIEW: CPT | Mod: 26

## 2023-08-12 PROCEDURE — 99285 EMERGENCY DEPT VISIT HI MDM: CPT

## 2023-08-12 PROCEDURE — 99223 1ST HOSP IP/OBS HIGH 75: CPT | Mod: GC

## 2023-08-12 RX ORDER — SPIRONOLACTONE 25 MG/1
25 TABLET, FILM COATED ORAL DAILY
Refills: 0 | Status: DISCONTINUED | OUTPATIENT
Start: 2023-08-12 | End: 2023-08-17

## 2023-08-12 RX ORDER — RIVAROXABAN 15 MG-20MG
20 KIT ORAL DAILY
Refills: 0 | Status: DISCONTINUED | OUTPATIENT
Start: 2023-08-12 | End: 2023-08-15

## 2023-08-12 RX ORDER — ACETAMINOPHEN 500 MG
1000 TABLET ORAL ONCE
Refills: 0 | Status: COMPLETED | OUTPATIENT
Start: 2023-08-12 | End: 2023-08-12

## 2023-08-12 RX ORDER — FUROSEMIDE 40 MG
40 TABLET ORAL DAILY
Refills: 0 | Status: DISCONTINUED | OUTPATIENT
Start: 2023-08-12 | End: 2023-08-13

## 2023-08-12 RX ORDER — IPRATROPIUM BROMIDE 0.2 MG/ML
500 SOLUTION, NON-ORAL INHALATION EVERY 6 HOURS
Refills: 0 | Status: DISCONTINUED | OUTPATIENT
Start: 2023-08-12 | End: 2023-08-17

## 2023-08-12 RX ORDER — FUROSEMIDE 40 MG
40 TABLET ORAL ONCE
Refills: 0 | Status: DISCONTINUED | OUTPATIENT
Start: 2023-08-12 | End: 2023-08-12

## 2023-08-12 RX ORDER — IPRATROPIUM BROMIDE 0.2 MG/ML
500 SOLUTION, NON-ORAL INHALATION ONCE
Refills: 0 | Status: COMPLETED | OUTPATIENT
Start: 2023-08-12 | End: 2023-08-12

## 2023-08-12 RX ORDER — PANTOPRAZOLE SODIUM 20 MG/1
40 TABLET, DELAYED RELEASE ORAL
Refills: 0 | Status: DISCONTINUED | OUTPATIENT
Start: 2023-08-12 | End: 2023-08-17

## 2023-08-12 RX ADMIN — Medication 500 MICROGRAM(S): at 19:54

## 2023-08-12 RX ADMIN — Medication 20 MILLIGRAM(S): at 21:19

## 2023-08-12 RX ADMIN — Medication 40 MILLIGRAM(S): at 21:19

## 2023-08-12 RX ADMIN — Medication 400 MILLIGRAM(S): at 16:42

## 2023-08-12 RX ADMIN — Medication 500 MICROGRAM(S): at 10:22

## 2023-08-12 RX ADMIN — Medication 400 MILLIGRAM(S): at 23:11

## 2023-08-12 NOTE — H&P ADULT - NSICDXPASTSURGICALHX_GEN_ALL_CORE_FT
PAST SURGICAL HISTORY:  H/O tubal ligation     History of pacemaker 12/19 - [employer] model Ingevity 4469    History of tubal ligation     Pacemaker

## 2023-08-12 NOTE — ED PROVIDER NOTE - CLINICAL SUMMARY MEDICAL DECISION MAKING FREE TEXT BOX
43-year-old female w/ PMH of idiopathic pulmonary hypertension and chronic sided heart failure, right atrial thrombus on Xarelto presenting to emergency department with 1 day of acute onset shortness of breath and palpitations.  Patient denies infectious symptoms.  Reports worsening exertional dyspnea similar to that which required admission 2 months ago.  Hemodynamically stable.  EKG without dysrhythmia or acute new ischemic changes.  Old T wave inversions in the anterior precordial leads.  Patient heart rate sinus WNL no unilateral DVT symptoms, low suspicion for pulmonary embolism.  Low suspicion for ACS.  Differential includes acute exacerbation of pulmonary hypertension, acute CHF exacerbation, viral syndrome.  Will obtain screening labs, VBG and CXR.  Disposition pending labs and imaging, likely admission.

## 2023-08-12 NOTE — H&P ADULT - HISTORY OF PRESENT ILLNESS
Pt is a 42 y/o female with past medical hx including pulmonary HTN c/b right heart failure, right atrial thrombus on xarelto, asthma, anemia presenting with 1 day of shortness of breath and palpitations. This morning, while walking to the fridge in her kitchen, she became increasingly short of breath and noticed a fast heart rate. Her dyspnea worsened until she decided to present to the ED. She has had similar episodes in the past in setting of her pulmonary HTN and right heart failure. She denies any chest pain or pressure, but endorses some tightness which she feels is similar to her baseline chest tightness 2/2 asthma. She also notes cough productive of clear sputum over the last week. Denies any fevers/chills, abdominal pain, chest pain, hemoptysis, leg swelling, vomiting, or diarrhea. Endorses some nausea at baseline but denies any increase today. Also endorses headache. Patient was last admitted 2 months ago with acute on chronic right heart failure exacerbation. Pt is a 44 y/o female with past medical hx including pulmonary HTN c/b right heart failure, multiple abdominal wall abscesses, right atrial thrombus s/p thrombectomy, asthma, anemia presenting with 1 day of shortness of breath and palpitations. This morning, while walking to the fridge in her kitchen, she became increasingly short of breath and noticed a fast heart rate. Her dyspnea worsened until she decided to present to the ED. She has had similar episodes in the past in setting of her pulmonary HTN and right heart failure. She denies any chest pain or pressure, but endorses some tightness which she feels is similar to her baseline chest tightness 2/2 asthma. She also notes cough productive of clear sputum over the last week. Denies any fevers/chills, abdominal pain, chest pain, hemoptysis, leg swelling, vomiting, or diarrhea. Endorses some nausea at baseline but denies any increase today. Also endorses headache. Patient was last admitted 2 months ago with acute on chronic right heart failure exacerbation. Pt is a 42 y/o female with medical hx including pulmonary HTN c/b right heart failure, multiple abdominal wall abscesses, right atrial thrombus s/p thrombectomy, asthma, anemia presenting with 1 day of shortness of breath and palpitations. This morning, while walking to the fridge in her kitchen, she became increasingly short of breath and noticed a fast heart rate. Her dyspnea worsened until she decided to present to the ED. She has had similar episodes in the past in setting of her pulmonary HTN and right heart failure. She denies any chest pain or pressure, but endorses some tightness which she feels is similar to her baseline chest tightness 2/2 asthma. She also notes cough productive of clear sputum over the last week. Denies any fevers/chills, abdominal pain, chest pain, hemoptysis, leg swelling, vomiting, or diarrhea. Endorses some nausea at baseline but denies any increase today. Also endorses headache. Patient was last admitted 2 months ago with acute on chronic right heart failure exacerbation.    In ED, patient's labs were significant for WBC of 10.5, Hgb of 13.5, BNP of 336 (down from 784), troponin of 7, EKG w/ NSR and LVH, and negative RVP. Chest x-ray was obtained and showed pulmonary vascular congestion.

## 2023-08-12 NOTE — DISCHARGE NOTE PROVIDER - HOSPITAL COURSE
Pt is a 44 y/o female with medical hx including pulmonary HTN c/b right heart failure, multiple abdominal wall abscesses, right atrial thrombus s/p thrombectomy, asthma, anemia presenting with 1 day of shortness of breath and admitted with exacerbation of right heart failure in setting of pulmonary HTN. In ED, patient's labs were significant for WBC of 10.5, Hgb of 13.5, BNP of 336 (down from 784), troponin of 7, EKG w/ NSR and LVH, and negative RVP. Chest x-ray was obtained and showed pulmonary vascular congestion. She was breathing comfortably and satting well on 3L NC. Diuresis was started with IV lasix 40 mg qd. Patient was also continued on home doses of remodulin, slidenafil, rivaroxaban, and spironolactone for pulmonary HTN and heart failure. Pulmonology and cardiology were consulted. Patient's respiratory status improved with diuresis and she was weaned to her home O2 requirement of 2L. Her home medications were optimized and she was medically cleared for discharge.    The patient is afebrile, hemodynamically stable and medically optimized for discharge to home with follow up with PCP, pulmonology. On day of discharge, patient is clinically stable with no new exam findings or acute symptoms compared to prior. The patient was seen by the attending physician on the date of discharge and deemed stable and acceptable for discharge. The patient's chronic medical conditions were treated accordingly per the patient's home medication regimen. The patient's medication reconciliation (with changes made to chronic medications), follow up appointments, discharge orders, instructions, and significant lab and diagnostic studies are as noted.   Pt is a 44 y/o female with medical hx including pulmonary HTN c/b right heart failure, multiple abdominal wall abscesses, right atrial thrombus s/p thrombectomy, asthma, anemia presenting with 1 day of shortness of breath and admitted with exacerbation of right heart failure in setting of pulmonary HTN. In ED, patient's labs were significant for WBC of 10.5, Hgb of 13.5, BNP of 336 (down from 784), troponin of 7, EKG w/ NSR and LVH, and negative RVP. Chest x-ray was obtained and showed pulmonary vascular congestion. She was breathing comfortably and satting well on 3L NC. Diuresis was started with IV lasix 40 mg qd. Patient was also continued on home doses of remodulin, slidenafil, rivaroxaban, and spironolactone for pulmonary HTN and heart failure. Pulmonology and cardiology were consulted. Patient's respiratory status improved with diuresis and she was weaned to her home O2 requirement of 2L. Cardiology recommended TTE and it showed ______. Her home medications were optimized and she was medically cleared for discharge.    The patient is afebrile, hemodynamically stable and medically optimized for discharge to home with follow up with PCP, pulmonology. On day of discharge, patient is clinically stable with no new exam findings or acute symptoms compared to prior. The patient was seen by the attending physician on the date of discharge and deemed stable and acceptable for discharge. The patient's chronic medical conditions were treated accordingly per the patient's home medication regimen. The patient's medication reconciliation (with changes made to chronic medications), follow up appointments, discharge orders, instructions, and significant lab and diagnostic studies are as noted.   Pt is a 42 y/o female with medical hx including pulmonary HTN c/b right heart failure, multiple abdominal wall abscesses, right atrial thrombus s/p thrombectomy, asthma, anemia presenting with 1 day of shortness of breath and admitted with exacerbation of right heart failure in setting of pulmonary HTN. In ED, patient's labs were significant for WBC of 10.5, Hgb of 13.5, BNP of 336 (down from 784), troponin of 7, EKG w/ NSR and LVH, and negative RVP. Chest x-ray was obtained and showed pulmonary vascular congestion. She was breathing comfortably and satting well on 3L NC. Diuresis was started with IV lasix 40 mg qd. Patient was also continued on home doses of remodulin, slidenafil, rivaroxaban, and spironolactone for pulmonary HTN and heart failure. Pulmonology and heart failure were consulted. Patient's respiratory status improved with diuresis and she was weaned to her home O2 requirement of 2L. Cardiology recommended TTE and RHC. TTE revealed right ventricular volume and pressure overload and normal left ventricular systolic function (EF 55-60%). RHC revealed elevated RV pressure, c/w pulmonary HTN, and PCWP pressure. HF further recommend transitioning her home PO Lasix to torsemide 20mg qd. The remainder of her home medications were optimized and she was medically cleared for discharge.    The patient is afebrile, hemodynamically stable and medically optimized for discharge to home with follow up with PCP, pulmonology. On day of discharge, patient is clinically stable with no new exam findings or acute symptoms compared to prior. The patient was seen by the attending physician on the date of discharge and deemed stable and acceptable for discharge. The patient's chronic medical conditions were treated accordingly per the patient's home medication regimen. The patient's medication reconciliation (with changes made to chronic medications), follow up appointments, discharge orders, instructions, and significant lab and diagnostic studies are as noted.

## 2023-08-12 NOTE — ED ADULT NURSE NOTE - NSICDXPASTSURGICALHX_GEN_ALL_CORE_FT
PAST SURGICAL HISTORY:  H/O tubal ligation     History of pacemaker 12/19 - [employer] model Ingevity 4469    History of tubal ligation     Pacemaker

## 2023-08-12 NOTE — ED PROVIDER NOTE - NSICDXPASTSURGICALHX_GEN_ALL_CORE_FT
PAST SURGICAL HISTORY:  H/O tubal ligation     History of pacemaker 12/19 - [employer] model Ingevity 4469    History of tubal ligation     Pacemaker

## 2023-08-12 NOTE — ED PROVIDER NOTE - ST/T WAVE
T wave inversions notable in V1-V2, slight ST depressions in precordial leads, nonacute seen on old EKG.

## 2023-08-12 NOTE — DISCHARGE NOTE PROVIDER - NSDCFUADDAPPT_GEN_ALL_CORE_FT
APPTS ARE READY TO BE MADE: [ ] YES    Best Family or Patient Contact (if needed):    Additional Information about above appointments (if needed):    1: Please follow-up with your pulmonologist in 1 week  2:   3:     Other comments or requests:    APPTS ARE READY TO BE MADE: [x] YES    Best Family or Patient Contact (if needed):    Additional Information about above appointments (if needed):    1: Please follow-up with your pulmonologist/PCP in 1 week    Other comments or requests:    APPTS ARE READY TO BE MADE: [x] YES    Best Family or Patient Contact (if needed):    Additional Information about above appointments (if needed):    1: Please follow-up with your pulmonologist/PCP in 1 week    Other comments or requests:   Patient was previously scheduled on 8/21/23 at 2PM at [address] [place] [address] with Dr. Vivian Yoon.

## 2023-08-12 NOTE — H&P ADULT - ATTENDING COMMENTS
44 y/o female with past medical hx including asthma (moderate-severe persistent), pulmHTN 2/2 PAH (WHO Group I, Class III), chronic hypoxemic respiratory failure (2L NC ATC) , multiple abdominal wall abscesses, right atrial thrombus s/p thrombectomy, recent admission for pulmonary HTN c/b right heart failure presenting with 1 day of dyspnea   1.Dyspnea/ pulmHTN 2/2 PAH (WHO Group I, Class III)/ chronic hypoxemic respiratory failure (2L NC ATC)/ CHF/ RV dysfunction   -c/w IV lasix 40mg qd  -c/w Prednisone 10mg QD (for asthma control)  -c/w atrovent  -c/w home Sildenafil 20mg TID  -c/w Remodulin SQ   -c/w aldactone 25mg qd  -sating 97% on 3L  -echo 6/23- elevated RV pressures   -pulm eval   -cards eval   -pt needs further optimization of her meds

## 2023-08-12 NOTE — DISCHARGE NOTE PROVIDER - NSDCCPTREATMENT_GEN_ALL_CORE_FT
PRINCIPAL PROCEDURE  Procedure: XR chest, 1 view  Findings and Treatment: IMPRESSION:  Clear lungs.  Stable enlarged pulmonary arteries.     PRINCIPAL PROCEDURE  Procedure: Right heart catheterization  Findings and Treatment: Diagnostic Conclusions:   Pulmonary HTN, elevated PWP.   Management as per CHF.      SECONDARY PROCEDURE  Procedure: Transthoracic echocardiography (TTE)  Findings and Treatment: 1. Right ventricular volume and pressure overload. Left ventricular systolic function is normal with an ejection fraction visually estimated at 55 to 60 %.   2. Mildly enlarged right ventricular cavity size and reduced systolic right ventricular function.   3. Device lead is visualized in the right heart.   4. Trace pericardial effusion.   5. Compared to the transthoracic echocardiogram performed on 6/8/2023 the current study has poor acoustic windows, which may limit interpretation.

## 2023-08-12 NOTE — DISCHARGE NOTE PROVIDER - NSDCMRMEDTOKEN_GEN_ALL_CORE_FT
furosemide 40 mg oral tablet: 1 tab(s) orally every other day  omeprazole 40 mg oral delayed release capsule: 1 cap(s) orally once a day  predniSONE 10 mg oral tablet: 1 tab(s) orally once a day  Remodulin 5 mg/mL injectable solution: patient is taking 46ng/kg/min via her own SQ PUMP  rivaroxaban 20 mg oral tablet: 1 tab(s) orally once a day (before a meal)  sildenafil 20 mg oral tablet: 1 tab(s) orally every 8 hours   spironolactone 25 mg oral tablet: 1 tab(s) orally once a day NOTE: As per Pharmacy, last filled was May 2023 for 30 days supply   omeprazole 40 mg oral delayed release capsule: 1 cap(s) orally once a day  predniSONE 10 mg oral tablet: 1 tab(s) orally once a day  Remodulin 5 mg/mL injectable solution: patient is taking 46ng/kg/min via her own SQ PUMP  rivaroxaban 20 mg oral tablet: 1 tab(s) orally once a day (before a meal)  sildenafil 20 mg oral tablet: 1 tab(s) orally every 8 hours   spironolactone 25 mg oral tablet: 1 tab(s) orally once a day NOTE: As per Pharmacy, last filled was May 2023 for 30 days supply  torsemide 20 mg oral tablet: 1 tab(s) orally once a day

## 2023-08-12 NOTE — H&P ADULT - PROBLEM SELECTOR PLAN 1
-In s/o pulmonary HTN  -On remodulin, slidenafil, xarelto at home  -Lasix 40mg every other day, spironolactone 25 mg qd at home  -On 2L O2 at home  -Patient likely volume-overloaded in s/o pulmonary HTN  -Satting well on 2L NC here  -Will diurese with 40 mg IV lasix  -Strict I&O, daily weights  -CXR with venous congestion, potential consolidation--> consider  abx, bcx. No white count or SIRS criteria  -EKG w/ NSR and RVH -In s/o pulmonary HTN  -On remodulin, slidenafil, xarelto at home  -Lasix 40mg every other day, spironolactone 25 mg qd at home  -On 2L O2 at home  -Patient likely volume-overloaded in s/o pulmonary HTN  -Satting well on 3L NC here  -Chest x-ray with pulmonary arterial congestion  -EKG w/ NSR and RVH  Plan:  -Will diurese with 40 mg IV lasix qd  -Will continue home remodulin, slidenafil, xarelto, spironolactone, prednisone  -Pulm and cardiology consulted  -Strict I&O, daily weights

## 2023-08-12 NOTE — ED ADULT NURSE NOTE - NSFALLHARMRISKINTERV_ED_ALL_ED

## 2023-08-12 NOTE — DISCHARGE NOTE PROVIDER - NSDCFUSCHEDAPPT_GEN_ALL_CORE_FT
Vivian Yoon  [facility]  PULMMED [address] [facility]  Scheduled Appointment: 08/21/2023    [facility]  ELECTROPH [phone]  Scheduled Appointment: 09/26/2023

## 2023-08-12 NOTE — ED ADULT NURSE NOTE - OBJECTIVE STATEMENT
·Patient with hx: pulmonary hypertension with chronic right heart failure,  right atrial thrombus on Xarelto came to the ER with worsening dyspnea and palpitations. ·Patient with hx: pulmonary hypertension with chronic right heart failure,  right atrial thrombus on Xarelto came to the ER with worsening dyspnea and palpitations. Pt is alert and orientedX4. Pt with dyspnea on exertion. Pt is able to speak in complete sentences. Denies any pain at this time. Pt is mildly anxious. Emotion

## 2023-08-12 NOTE — ED PROVIDER NOTE - PHYSICAL EXAMINATION
GEN: Patient awake and alert. No acute distress.  Head: normocephalic, atraumatic.  Neck: Nontender, full ROM. No LAD.  Eyes: PERRLA b/l. EOMI, no scleral icterus, no conjunctival injection. Moist mucous membranes.  CARDIAC: RRR. Normal S1, S2. No murmur, rubs, or gallops. No peripheral edema noted.  PULM: mild b/l expiratory intermittent wheeze and bibasilar rales. No signs of respiratory distress, no accessory muscle usage or nasal flaring.  ABD: Soft, nontender, nondistended. No rebound, no involuntary guarding. BS x 4, no auscultated bruit. No HSM appreciated.  : No CVA tenderness, no suprapubic tenderness.  MSK: Moving all extremities. 5/5 strength and full ROM in all extremities. No obvious deformity.  NEURO: A&Ox3, no focal neurological deficits, CN 2-12 grossly intact.   SKIN: warm, dry, no rash, no lesions, no open wounds. No urticaria.

## 2023-08-12 NOTE — DISCHARGE NOTE PROVIDER - NSDCCPCAREPLAN_GEN_ALL_CORE_FT
PRINCIPAL DISCHARGE DIAGNOSIS  Diagnosis: Right heart failure  Assessment and Plan of Treatment: You were admitted to the hospital with shortness of breath due to a right heart failure exacerbation as a result of your pulmonary hypertension. In the hospital, you were treated with lasix (a diuretic) through the IV and your breathing improved as we were able to remove fluid from your lungs. We continued all of your other pulmonary hypertension medications as they were prescribed in the hospital. Upon discharge, please follow-up with your pulmonologist and PCP. Please also continue to take all medications exactly as prescribed. Return to the hospital if you experience increased shortness of breath, chest pain, leg swelling, or palpitations.     PRINCIPAL DISCHARGE DIAGNOSIS  Diagnosis: Right heart failure  Assessment and Plan of Treatment: You were admitted to the hospital with shortness of breath due to a right heart failure exacerbation as a result of your pulmonary hypertension. In the hospital, you were treated with lasix (a diuretic) through the IV and your breathing improved as we were able to remove fluid from your lungs. We continued all of your other pulmonary hypertension medications as they were prescribed in the hospital. You also receieved a right heart catheterization to look at the pressures in your heart, and it showed a higher pressure on the right side of your heart consistent with pulmonary hypertension. We also switched your diuetic medication from Lasix to Torsemide. Upon discharge, please follow-up with your pulmonologist and PCP. Please also continue to take all medications exactly as prescribed. Return to the hospital if you experience increased shortness of breath, chest pain, leg swelling, or palpitations.

## 2023-08-12 NOTE — ED PROVIDER NOTE - ATTENDING CONTRIBUTION TO CARE
Lavon- I performed a history and physical exam of the patient and discussed their management with the resident. I reviewed the resident's note and agree with the documented findings and plan of care, except as noted. My medical decision making and observations are as follows:    43F with PMHx ILD, Pulmonary Hypertension (On Rimodulin and Xarelto, on home 2L O2), RA thrombus s/p thrombectomy, multiple abdominal wall abscesses (last 5/18 s/p I&D), recent admission 6/7-6/12 for acute on chronic R heart failure, presenting with palpitations and worsening shortness of breath with associated orthopnea and dyspnea on exertion.  Patient states symptoms feel similar to prior recent admission.  No chest pain.  No fever, cough, abdominal pain, GI symptoms, dysuria.  On exam, patient no acute distress.  Mildly tachypneic otherwise normal work of breathing; no accessory muscle use sentences.  Lungs clear to auscultation.  No pedal edema.     MDM- patient with pulmonary hypertension and right heart failure presenting with similar to prior acute exacerbation of heart failure.  Vital signs stable, clinically patient does not appear to be volume overloaded.  Given compliance with anticoagulation, lack of signs/symptoms concerning for DVT, lack of hypoxemia, clinical suspicion for pulmonary embolism below the threshold for further evaluation. Will administer ipratropium for symptomatic treatment.     No significant leukocytosis or anemia.  No significant electrolyte abnormalities or evidence of EDOUARD.  LFTs within normal limits.  proBNP minimally elevated.  Troponin 7.  No acid-base derangement or evidence of CO2 retention. My independent interpretation, chest x-ray without new consolidation or effusion.    Given patient has minimal symptomatic improvement, will admit for further cardiopulmonary monitoring/evaluation.

## 2023-08-12 NOTE — H&P ADULT - NSHPREVIEWOFSYSTEMS_GEN_ALL_CORE
REVIEW OF SYSTEMS:    CONSTITUTIONAL: No weakness, fevers or chills. + headache  EYES/ENT: No visual changes;  No vertigo or throat pain   NECK: No pain or stiffness  RESPIRATORY: + cough,  + wheezing, mild SOB, no hemoptysis  CARDIOVASCULAR: No chest pain or palpitations  GASTROINTESTINAL: No abdominal or epigastric pain. No nausea, vomiting, or hematemesis; No diarrhea or constipation. No melena or hematochezia.  GENITOURINARY: No dysuria, frequency or hematuria  NEUROLOGICAL: No numbness or weakness  SKIN: No itching, rashes

## 2023-08-12 NOTE — ED PROCEDURE NOTE - ATTENDING CONTRIBUTION TO CARE
Caitlin I was present for the critical and key portions of the procedure and I was immediately available to provide assistance. I agree with the documentation unless otherwise noted below.

## 2023-08-12 NOTE — DISCHARGE NOTE PROVIDER - CARE PROVIDER_API CALL
Vivian Yoon  Pulmonary Disease  [address], [address]  [place] [address]  Phone: [phone]  Fax: [phone]  Established Patient  Follow Up Time:

## 2023-08-12 NOTE — H&P ADULT - NSHPLABSRESULTS_GEN_ALL_CORE
< from: TTE W or WO Ultrasound Enhancing Agent (06.08.23 @ 09:38) >    CONCLUSIONS:      1. Small left ventricular cavity size. The left ventricular wall thickness is normal. Right ventricular pressure overload. The left ventricular systolic function is normal with an ejection fraction of 64 % by Holcomb's method of disks. There are no regional wall motion abnormalities seen.   2. Moderately enlarged right ventricular cavity size, increased wall thickness and moderately reduced systolic function. The tricuspid annular plane systolic excursion (TAPSE) is 2.1 cm (normal >=1.7 cm).   3. Device lead is visualized in the right heart.   4. Trace pericardial effusion noted adjacent to the right atrium.   5. Compared to the transthoracic echocardiogram performed on 11/2/2020 there have been no significant interval changes.    < end of copied text >

## 2023-08-12 NOTE — H&P ADULT - NSHPPHYSICALEXAM_GEN_ALL_CORE
GENERAL: NAD, lying in bed comfortably  HEAD:  Atraumatic, normocephalic  EYES: EOMI, conjunctiva and sclera clear  NECK: Supple, trachea midline, no JVD  HEART: Regular rate and rhythm, no murmurs, rubs, or gallops  LUNGS: Unlabored respirations. Scattered wheezes, mildly diminished breath sounds at bilateral bases.  ABDOMEN: Soft, nontender, nondistended.  EXTREMITIES: Trace LE edema L>R  NERVOUS SYSTEM:  A&Ox3, moving all extremities, no focal deficits   SKIN: No rashes or lesions

## 2023-08-12 NOTE — ED PROVIDER NOTE - OBJECTIVE STATEMENT
44y/o obese F with h/o pulmonary hypertension complicated by chronic right heart failure,  right atrial thrombus on Xarelto  presents emergency department with 1 day of acute onset worsening dyspnea and palpitations.  Patient reports he was in her normal state of health until this morning when she had worsening dyspnea on exertion while walking to the fridge.  Reports that she also had recurrence of chronic palpitations.  Patient denies chest pain, hemoptysis, unilateral DVT symptoms.  No fever/chills, cough.   Positive congestion.  Patient was recently admitted 2 months ago for acute on chronic right heart failure exacerbation.  Takes Lasix 40 every other day.  Did not take today.  denies dizziness, syncope, urinary symptoms, abdominal pain, N/V/D, changes in bowel movements.

## 2023-08-12 NOTE — H&P ADULT - ASSESSMENT
44 y/o female w/ hx of pulmonary HTN c/b right heart failure, multiple abdominal wall abscesses, right atrial thrombus s/p thrombectomy, asthma, anemia admitted for right heart failure exacerbation in s/o pulmonary HTN.

## 2023-08-13 DIAGNOSIS — I27.20 PULMONARY HYPERTENSION, UNSPECIFIED: ICD-10-CM

## 2023-08-13 DIAGNOSIS — I51.3 INTRACARDIAC THROMBOSIS, NOT ELSEWHERE CLASSIFIED: ICD-10-CM

## 2023-08-13 LAB
ANION GAP SERPL CALC-SCNC: 16 MMOL/L — SIGNIFICANT CHANGE UP (ref 5–17)
BUN SERPL-MCNC: 13 MG/DL — SIGNIFICANT CHANGE UP (ref 7–23)
CALCIUM SERPL-MCNC: 8.6 MG/DL — SIGNIFICANT CHANGE UP (ref 8.4–10.5)
CHLORIDE SERPL-SCNC: 102 MMOL/L — SIGNIFICANT CHANGE UP (ref 96–108)
CO2 SERPL-SCNC: 19 MMOL/L — LOW (ref 22–31)
CREAT SERPL-MCNC: 0.95 MG/DL — SIGNIFICANT CHANGE UP (ref 0.5–1.3)
EGFR: 76 ML/MIN/1.73M2 — SIGNIFICANT CHANGE UP
GLUCOSE SERPL-MCNC: 78 MG/DL — SIGNIFICANT CHANGE UP (ref 70–99)
HCT VFR BLD CALC: 47.5 % — HIGH (ref 34.5–45)
HGB BLD-MCNC: 14.4 G/DL — SIGNIFICANT CHANGE UP (ref 11.5–15.5)
MAGNESIUM SERPL-MCNC: 1.8 MG/DL — SIGNIFICANT CHANGE UP (ref 1.6–2.6)
MCHC RBC-ENTMCNC: 20.4 PG — LOW (ref 27–34)
MCHC RBC-ENTMCNC: 30.3 GM/DL — LOW (ref 32–36)
MCV RBC AUTO: 67.3 FL — LOW (ref 80–100)
NRBC # BLD: 0 /100 WBCS — SIGNIFICANT CHANGE UP (ref 0–0)
PHOSPHATE SERPL-MCNC: 4.4 MG/DL — SIGNIFICANT CHANGE UP (ref 2.5–4.5)
PLATELET # BLD AUTO: 443 K/UL — HIGH (ref 150–400)
POTASSIUM SERPL-MCNC: 3.6 MMOL/L — SIGNIFICANT CHANGE UP (ref 3.5–5.3)
POTASSIUM SERPL-SCNC: 3.6 MMOL/L — SIGNIFICANT CHANGE UP (ref 3.5–5.3)
RBC # BLD: 7.06 M/UL — HIGH (ref 3.8–5.2)
RBC # FLD: 19.9 % — HIGH (ref 10.3–14.5)
SODIUM SERPL-SCNC: 137 MMOL/L — SIGNIFICANT CHANGE UP (ref 135–145)
WBC # BLD: 10.5 K/UL — SIGNIFICANT CHANGE UP (ref 3.8–10.5)
WBC # FLD AUTO: 10.5 K/UL — SIGNIFICANT CHANGE UP (ref 3.8–10.5)

## 2023-08-13 PROCEDURE — 99223 1ST HOSP IP/OBS HIGH 75: CPT | Mod: GC

## 2023-08-13 PROCEDURE — 99232 SBSQ HOSP IP/OBS MODERATE 35: CPT

## 2023-08-13 PROCEDURE — 99222 1ST HOSP IP/OBS MODERATE 55: CPT | Mod: GC

## 2023-08-13 RX ORDER — FUROSEMIDE 40 MG
40 TABLET ORAL DAILY
Refills: 0 | Status: DISCONTINUED | OUTPATIENT
Start: 2023-08-14 | End: 2023-08-17

## 2023-08-13 RX ORDER — ACETAMINOPHEN 500 MG
650 TABLET ORAL EVERY 6 HOURS
Refills: 0 | Status: DISCONTINUED | OUTPATIENT
Start: 2023-08-13 | End: 2023-08-17

## 2023-08-13 RX ADMIN — Medication 20 MILLIGRAM(S): at 13:29

## 2023-08-13 RX ADMIN — Medication 10 MILLIGRAM(S): at 06:20

## 2023-08-13 RX ADMIN — Medication 650 MILLIGRAM(S): at 10:38

## 2023-08-13 RX ADMIN — Medication 20 MILLIGRAM(S): at 23:18

## 2023-08-13 RX ADMIN — Medication 20 MILLIGRAM(S): at 06:20

## 2023-08-13 RX ADMIN — Medication 650 MILLIGRAM(S): at 11:38

## 2023-08-13 RX ADMIN — PANTOPRAZOLE SODIUM 40 MILLIGRAM(S): 20 TABLET, DELAYED RELEASE ORAL at 06:20

## 2023-08-13 RX ADMIN — SPIRONOLACTONE 25 MILLIGRAM(S): 25 TABLET, FILM COATED ORAL at 10:38

## 2023-08-13 RX ADMIN — Medication 40 MILLIGRAM(S): at 06:21

## 2023-08-13 RX ADMIN — RIVAROXABAN 20 MILLIGRAM(S): KIT at 13:30

## 2023-08-13 RX ADMIN — Medication 500 MICROGRAM(S): at 06:29

## 2023-08-13 RX ADMIN — Medication 500 MICROGRAM(S): at 18:52

## 2023-08-13 NOTE — CONSULT NOTE ADULT - PROBLEM SELECTOR RECOMMENDATION 9
Right heart failure with normal LVEF  - Continue Lasix 40mg IV daily if not achieving net goal of 1L would increase to 40mg IV Q12  - If worsening respiratory status or worsening tachycardia need to consider PE on differential given history although less likely given on Xarelto   - Continue spironolactone 25 mg daily  - Obtain repeat TTE   - Maintain Strict I/O's   - Daily standing weights  - Replete electrolytes to K>4.0 and Mg >2.5.

## 2023-08-13 NOTE — PROGRESS NOTE ADULT - ASSESSMENT
42 y/o female w/ hx of pulmonary HTN c/b right heart failure, multiple abdominal wall abscesses, right atrial thrombus s/p thrombectomy, asthma, anemia admitted for right heart failure exacerbation in s/o pulmonary HTN.

## 2023-08-13 NOTE — CONSULT NOTE ADULT - ATTENDING COMMENTS
Patient was seen and examined with the fellow.  I agree with the above except for the following:    Briefly, Ms. Conway is a 42 yo female with pulmonary HTN (Group 1 and 4) c/b right heart failure on home SQ Remodulin and home O2 2L, right atrial thrombus s/p thrombectomy and asthma who presents with SOB, and increased mucous production.  She endorses stable weight at home (cannot tell me the weight).  On exam she appears to have JVP around 10-12cm with no lower extremity edema.      - continue IV diuresis for today with lasix 40mg IV X1.  Would aim for net -1 to 2L today.  Monitor strict I and Os with daily standing weight measurement.  Check electrolytes and replete to keep K > 4 and Mg > 2.  - obtain repeat echo.  - consider RHC with cardioMEMS placement.  - at time of RHC would get C to evaluate RCA lesion seen on cath previously.  No evidence of acute ishcemia, troponin low.  EKG with no ischemic changes (tall R wave in V1 likley in the setting of pulm HTN).    - Pulm HTN team consult.

## 2023-08-13 NOTE — PROGRESS NOTE ADULT - SUBJECTIVE AND OBJECTIVE BOX
*******************************  Eduin Ahumada, PGY-1  Internal Medicine  Contact via [employer] TEAMS    *******************************    PROGRESS NOTE:     Patient is a 43y old  Female who presents with a chief complaint of Shortness of Breath (13 Aug 2023 06:03)      INTERVAL EVENTS: No acute overnight events.     SUBJECTIVE: Patient seen and examined at bedside. This morning, the patient is comfortable and doing well. No acute complaints. Denies fevers, chills, N/V/D, chest pain, SOB, abdominal pain.    MEDICATIONS  (STANDING):  furosemide   Injectable 40 milliGRAM(s) IV Push daily  pantoprazole    Tablet 40 milliGRAM(s) Oral before breakfast  predniSONE   Tablet 10 milliGRAM(s) Oral daily  Remodulin (Treprostinil) SubQ Infusion 1 Dose(s) 1 Dose(s) SubCutaneous Continuous Pump  rivaroxaban 20 milliGRAM(s) Oral daily  sildenafil (REVATIO) 20 milliGRAM(s) Oral every 8 hours  spironolactone 25 milliGRAM(s) Oral daily    MEDICATIONS  (PRN):  ipratropium    for Nebulization 500 MICROGram(s) Nebulizer every 6 hours PRN Asthma      CAPILLARY BLOOD GLUCOSE        I&O's Summary    12 Aug 2023 07:01  -  13 Aug 2023 07:00  --------------------------------------------------------  IN: 0 mL / OUT: 550 mL / NET: -550 mL        PHYSICAL EXAM:  Vital Signs Last 24 Hrs  T(C): 36.8 (13 Aug 2023 04:48), Max: 36.9 (12 Aug 2023 17:20)  T(F): 98.2 (13 Aug 2023 04:48), Max: 98.4 (12 Aug 2023 17:20)  HR: 100 (13 Aug 2023 04:48) (80 - 101)  BP: 100/70 (13 Aug 2023 04:48) (99/75 - 123/81)  BP(mean): --  RR: 18 (13 Aug 2023 04:48) (16 - 20)  SpO2: 96% (13 Aug 2023 04:48) (94% - 99%)    Parameters below as of 13 Aug 2023 04:48  Patient On (Oxygen Delivery Method): nasal cannula        GENERAL: NAD, lying in bed comfortably  HEAD: Atraumatic, normocephalic  EYES: EOMI, PERRLA, conjunctiva and sclera clear  ENT: Moist mucous membranes  NECK: Supple, no JVD  HEART: S1, S2, Regular rate and rhythm, no murmurs, rubs, or gallops  LUNGS: Unlabored respirations, clear to auscultation bilaterally, no crackles, wheezing, or rhonchi  ABDOMEN: Soft, nontender, nondistended, +BS  EXTREMITIES: 2+ peripheral pulses bilaterally. No clubbing, cyanosis, or edema  NERVOUS SYSTEM:  A&Ox3, no focal deficits   SKIN: No rashes or lesions    LABS:                        13.5   10.55 )-----------( 413      ( 12 Aug 2023 10:04 )             44.3     08-12    139  |  106  |  16  ----------------------------<  77  3.9   |  18<L>  |  0.95    Ca    8.9      12 Aug 2023 10:04    TPro  7.1  /  Alb  4.3  /  TBili  0.7  /  DBili  x   /  AST  10  /  ALT  8<L>  /  AlkPhos  64  08-12          Urinalysis Basic - ( 12 Aug 2023 10:04 )    Color: x / Appearance: x / SG: x / pH: x  Gluc: 77 mg/dL / Ketone: x  / Bili: x / Urobili: x   Blood: x / Protein: x / Nitrite: x   Leuk Esterase: x / RBC: x / WBC x   Sq Epi: x / Non Sq Epi: x / Bacteria: x          RADIOLOGY & ADDITIONAL TESTS:  Results Reviewed:   Imaging Personally Reviewed:  Electrocardiogram Personally Reviewed:  Tele: *******************************  Eduin Ahumada, PGY-1  Internal Medicine  Contact via [employer] TEAMS    *******************************    PROGRESS NOTE:     Patient is a 43y old  Female who presents with a chief complaint of Shortness of Breath (13 Aug 2023 06:03)      INTERVAL EVENTS: No acute overnight events.     SUBJECTIVE: Patient seen and examined at bedside. This morning, the patient is comfortable and doing well. No acute complaints. Notes improved breathing this morning on 2L NC. Denies fevers, chills, N/V/D, chest pain, SOB, abdominal pain.    MEDICATIONS  (STANDING):  furosemide   Injectable 40 milliGRAM(s) IV Push daily  pantoprazole    Tablet 40 milliGRAM(s) Oral before breakfast  predniSONE   Tablet 10 milliGRAM(s) Oral daily  Remodulin (Treprostinil) SubQ Infusion 1 Dose(s) 1 Dose(s) SubCutaneous Continuous Pump  rivaroxaban 20 milliGRAM(s) Oral daily  sildenafil (REVATIO) 20 milliGRAM(s) Oral every 8 hours  spironolactone 25 milliGRAM(s) Oral daily    MEDICATIONS  (PRN):  ipratropium    for Nebulization 500 MICROGram(s) Nebulizer every 6 hours PRN Asthma      CAPILLARY BLOOD GLUCOSE        I&O's Summary    12 Aug 2023 07:01  -  13 Aug 2023 07:00  --------------------------------------------------------  IN: 0 mL / OUT: 550 mL / NET: -550 mL        PHYSICAL EXAM:  Vital Signs Last 24 Hrs  T(C): 36.8 (13 Aug 2023 04:48), Max: 36.9 (12 Aug 2023 17:20)  T(F): 98.2 (13 Aug 2023 04:48), Max: 98.4 (12 Aug 2023 17:20)  HR: 100 (13 Aug 2023 04:48) (80 - 101)  BP: 100/70 (13 Aug 2023 04:48) (99/75 - 123/81)  BP(mean): --  RR: 18 (13 Aug 2023 04:48) (16 - 20)  SpO2: 96% (13 Aug 2023 04:48) (94% - 99%)    Parameters below as of 13 Aug 2023 04:48  Patient On (Oxygen Delivery Method): nasal cannula        GENERAL: NAD, lying in bed comfortably  HEAD: Atraumatic, normocephalic  EYES: EOMI, conjunctiva and sclera clear  ENT: Moist mucous membranes  NECK: Supple, no JVD  HEART: Regular rate and rhythm, no murmurs, rubs, or gallops  LUNGS: Unlabored respirations, mildly decreased breath sounds at bases  ABDOMEN: Soft, nontender, nondistended  EXTREMITIES: No LE edema  NERVOUS SYSTEM:  A&Ox3, no focal deficits   SKIN: No rashes or lesions    LABS:                        13.5   10.55 )-----------( 413      ( 12 Aug 2023 10:04 )             44.3     08-12    139  |  106  |  16  ----------------------------<  77  3.9   |  18<L>  |  0.95    Ca    8.9      12 Aug 2023 10:04    TPro  7.1  /  Alb  4.3  /  TBili  0.7  /  DBili  x   /  AST  10  /  ALT  8<L>  /  AlkPhos  64  08-12          Urinalysis Basic - ( 12 Aug 2023 10:04 )    Color: x / Appearance: x / SG: x / pH: x  Gluc: 77 mg/dL / Ketone: x  / Bili: x / Urobili: x   Blood: x / Protein: x / Nitrite: x   Leuk Esterase: x / RBC: x / WBC x   Sq Epi: x / Non Sq Epi: x / Bacteria: x          RADIOLOGY & ADDITIONAL TESTS:  Results Reviewed:   Imaging Personally Reviewed:  Electrocardiogram Personally Reviewed:  Tele:

## 2023-08-13 NOTE — CONSULT NOTE ADULT - ASSESSMENT
42 y/o female with medical hx including pulmonary HTN c/b right heart failure, multiple abdominal wall abscesses, right atrial thrombus s/p thrombectomy, asthma, anemia presenting with 1 day of shortness of breath and palpitations in the setting of right-sided heart failure and pulmonary hypertension.      Cardiac Studies  6/8 TTE LVIDd 3.4cm, LVEF 64%, interdeterminate DD, mod RVE, TAPSE 2.1cm, Trace MR, trace TR, mld-mod [place]  8/2022 TTE revealed RA thrombus with concern for clot in transit. She underwent catheter directed partial  thrombus aspiration 8/31  Limited TTE 8/31 @10:30: LVIDd 3.2 cm, hyperdynamic LV, new echogenic mass in RA 1.8 cm x 3.1 cm likely representing clot in transit (not seen on TTE from 8/29), RVE with decreased RV systolic function, est PASP 60 mmHg  9/1/22 Cath for venous thrombectomy right arm   RHC 7/23/20 RA 4, RV 80/4, PA 80/40/57, PCWP 22, LVEDP 4; CO/CI 4/2.0; PVR 11 (using EDP 13).

## 2023-08-13 NOTE — CONSULT NOTE ADULT - ATTENDING COMMENTS
44 yo F with PMHx pulm HTN 2/2 PAH (WHO Group I, Class III) on treprostinil SQ (with hx SQ site infection) with chronic hypoxemic respiratory failure (2L NC ATC) a 44 y/o F w/chronic hypoxemic respiratory failure on home O2 secondary to pulmonary HTN (WHO Group I) on treprostinil and sildenafil presenting with volume overload.    - Supplemental O2 as needed goal O2 sat >= 90%  - Diuresis goal net negative 1-2 L  - Continue home treprostinil and sildenafil   - Continue prednisone 10mg daily  - Continue xarelto for RA thrombus 42 y/o F w/chronic hypoxemic respiratory failure on home O2 secondary to pulmonary HTN w/cor pulmonale (WHO Group I) on treprostinil and sildenafil presenting with acute on chronic R heart failure    - Supplemental O2 as needed goal O2 sat >= 90%  - Diuresis goal net negative 1-2 L  - Continue home treprostinil and sildenafil   - Continue prednisone 10mg daily  - Continue xarelto for RA thrombus

## 2023-08-13 NOTE — PROGRESS NOTE ADULT - PROBLEM SELECTOR PLAN 1
-In s/o pulmonary HTN  -On remodulin, slidenafil, xarelto at home  -Lasix 40mg every other day, spironolactone 25 mg qd at home  -On 2L O2 at home  -Patient likely volume-overloaded in s/o pulmonary HTN  -Satting well on 3L NC here  -Chest x-ray with pulmonary arterial congestion  -EKG w/ NSR and RVH  Plan:  -Will diurese with 40 mg IV lasix qd  -Will continue home remodulin, slidenafil, xarelto, spironolactone, prednisone  -Pulm and cardiology consulted  -Strict I&O, daily weights -In s/o pulmonary HTN  -On remodulin, slidenafil, xarelto at home  -Lasix 40mg every other day, spironolactone 25 mg qd at home  -On 2L O2 at home  -Patient likely volume-overloaded in s/o pulmonary HTN  -Satting well on 3L NC here  -Chest x-ray with pulmonary arterial congestion  -EKG w/ NSR and RVH    Plan:  -Will diurese with 40 mg IV lasix qd  -Will continue home remodulin, slidenafil, xarelto, spironolactone, prednisone  -Pulm and cardiology consulted  -Cardiology recommended TTE. Ordered. Please f/u.  -Strict I&O, daily weights

## 2023-08-13 NOTE — CONSULT NOTE ADULT - ASSESSMENT
44 yo F with PMHx pulm HTN 2/2 PAH (WHO Group I, Class III) on treprostinil SQ (with hx SQ site infection) with chronic hypoxemic respiratory failure (2L NC ATC) and hx tachycardia s/p PPM who presented to [facility] with leg swelling. She was admitted for IV diuresis. Pulmonary consulted for further management.    # pulmonary hypertension (Group 1)  # asthma  Follows with Dr. Yoon, on treprostinil SQ, sildenafil at home  - RVP negative  - continue treprostinil 46 ng/kg/mg and sildenafil 20 mg TID  - will consider increasing remodulin this admission  - continue xarelto (hx RA thrombus)  - continue prednisone 10mg  - continue atrovent prn   - can switch to PO diuresis tomorrow: lasix 40 mg every other day, spironolactone 25   - continue home 2L NC  - incentive spirometry, oob to chair, ambulation      Discussed with Dr. Yoon and Dr. Lutz 44 yo F with PMHx pulm HTN 2/2 PAH (WHO Group I, Class III) on treprostinil SQ (with hx SQ site infection) with chronic hypoxemic respiratory failure (2L NC ATC) and hx tachycardia s/p PPM who presented to [facility] with leg swelling. She was admitted for IV diuresis. Pulmonary consulted for further management.    # pulmonary hypertension (Group 1)  # asthma  Follows with Dr. Yoon, on treprostinil SQ, sildenafil at home  - remodulin site does not appear infected  - RVP negative  - continue treprostinil 46 ng/kg/mg and sildenafil 20 mg TID  - will consider increasing remodulin this admission  - continue xarelto (hx RA thrombus)  - continue prednisone 10mg  - continue atrovent prn   - can switch to PO diuresis tomorrow: lasix 40 mg every other day, spironolactone 25   - continue home 2L NC  - incentive spirometry, oob to chair, ambulation  - f/u TTE      Discussed with Dr. Yoon and Dr. Lutz

## 2023-08-13 NOTE — CONSULT NOTE ADULT - PROBLEM SELECTOR RECOMMENDATION 2
Pulmonary HTN group I and IV)  - on continuous remodulin at 46 nanograms/kg/min;  - remains on home O2 requirment @2L/min NC  - Agree with pulm eval   - follow up with Dr. Yoon.

## 2023-08-13 NOTE — CONSULT NOTE ADULT - SUBJECTIVE AND OBJECTIVE BOX
Date of Admission:    Patient is a 43y old  Female who presents with a chief complaint of Shortness of Breath (12 Aug 2023 20:59)      HISTORY OF PRESENT ILLNESS:   Pt is a 44 y/o female with medical hx including pulmonary HTN c/b right heart failure, multiple abdominal wall abscesses, right atrial thrombus s/p thrombectomy, asthma, anemia presenting with 1 day of shortness of breath and palpitations. This morning, while walking to the fridge in her kitchen, she became increasingly short of breath and noticed a fast heart rate. Her dyspnea worsened until she decided to present to the ED. She has had similar episodes in the past in setting of her pulmonary HTN and right heart failure. She denies any chest pain or pressure, but endorses some tightness which she feels is similar to her baseline chest tightness 2/2 asthma. She also notes cough productive of clear sputum over the last week. Denies any fevers/chills, abdominal pain, chest pain, hemoptysis, leg swelling, vomiting, or diarrhea. Endorses some nausea at baseline but denies any increase today. Also endorses headache. Patient was last admitted 2 months ago with acute on chronic right heart failure exacerbation.        Allergies    vancomycin (Rash)  adhesives (Rash)  penicillin (Rash)    Intolerances    albuterol (Other; Rash)  	    MEDICATIONS:  furosemide   Injectable 40 milliGRAM(s) IV Push daily  rivaroxaban 20 milliGRAM(s) Oral daily  sildenafil (REVATIO) 20 milliGRAM(s) Oral every 8 hours  spironolactone 25 milliGRAM(s) Oral daily      ipratropium    for Nebulization 500 MICROGram(s) Nebulizer every 6 hours PRN      pantoprazole    Tablet 40 milliGRAM(s) Oral before breakfast    predniSONE   Tablet 10 milliGRAM(s) Oral daily        PAST MEDICAL & SURGICAL HISTORY:  Tachycardia      Anemia      Pulmonary hypertension      Pulmonary embolism      Asthma  On home O2 nightly at 2L via NC      PE (pulmonary thromboembolism)  on Xarelto 10 mg daily      Sinusitis      Corneal disorder      Right heart failure      CAD (coronary artery disease)      H/O pulmonary hypertension      Pulmonary embolism      Asthma      History of tachycardia      On supplemental oxygen by nasal cannula  O2 @ 2L      Pacemaker      Skin mass  left upper thigh mass      History of tubal ligation      Pacemaker      H/O tubal ligation      History of pacemaker  12/19 - [employer] model Ingevity 4419          FAMILY HISTORY:  Family history of diabetes mellitus  in brother    Family history of diabetes mellitus in mother    FH: anemia        SOCIAL HISTORY:    [ ] Non-smoker  [ ] Smoker  [ ] Alcohol      REVIEW OF SYSTEMS:    CONSTITUTIONAL: No weakness, fevers or chills  EYES/ENT: No visual changes;  No dysphagia  NECK: No pain or stiffness  RESPIRATORY: No cough, wheezing, hemoptysis;   CARDIOVASCULAR: No chest pain   GASTROINTESTINAL: No abdominal or epigastric pain. No nausea, vomiting, or hematemesis; No diarrhea or constipation. No melena or hematochezia.  BACK: No back pain  GENITOURINARY: No dysuria, frequency or hematuria  NEUROLOGICAL: No numbness or weakness  SKIN: No itching, burning, rashes, or lesions   All other review of systems is negative unless indicated above.  PHYSICAL EXAM:  T(C): 36.8 (08-13-23 @ 04:48), Max: 36.9 (08-12-23 @ 17:20)  HR: 100 (08-13-23 @ 04:48) (80 - 101)  BP: 100/70 (08-13-23 @ 04:48) (99/75 - 123/81)  RR: 18 (08-13-23 @ 04:48) (16 - 20)  SpO2: 96% (08-13-23 @ 04:48) (94% - 99%)  Wt(kg): --  I&O's Summary      Appearance: Normal	  HEENT:   Normal oral mucosa, PERRL, EOMI	  Lymphatic: No lymphadenopathy  Cardiovascular: Normal S1 S2, +JVD, No murmurs, Trace LE edema  Respiratory: Diminished breath sounds at the bases   Psychiatry: A & O x 3, Mood & affect appropriate  Gastrointestinal:  Soft, Non-tender, + BS	  Skin: No rashes, No ecchymoses, No cyanosis	  Neurologic: Non-focal  Extremities: Normal range of motion, No clubbing, cyanosis   Vascular: Peripheral pulses palpable 2+ bilaterally        LABS:	 	    CBC Full  -  ( 12 Aug 2023 10:04 )  WBC Count : 10.55 K/uL  Hemoglobin : 13.5 g/dL  Hematocrit : 44.3 %  Platelet Count - Automated : 413 K/uL  Mean Cell Volume : 66.7 fl  Mean Cell Hemoglobin : 20.3 pg  Mean Cell Hemoglobin Concentration : 30.5 gm/dL  Auto Neutrophil # : 6.91 K/uL  Auto Lymphocyte # : 2.46 K/uL  Auto Monocyte # : 0.55 K/uL  Auto Eosinophil # : 0.27 K/uL  Auto Basophil # : 0.36 K/uL  Auto Neutrophil % : 65.5 %  Auto Lymphocyte % : 23.3 %  Auto Monocyte % : 5.2 %  Auto Eosinophil % : 2.6 %  Auto Basophil % : 3.4 %    08-12    139  |  106  |  16  ----------------------------<  77  3.9   |  18<L>  |  0.95    Ca    8.9      12 Aug 2023 10:04    TPro  7.1  /  Alb  4.3  /  TBili  0.7  /  DBili  x   /  AST  10  /  ALT  8<L>  /  AlkPhos  64  08-12      proBNP: 336  Lipid Profile:   HgA1c:   TSH:       CARDIAC MARKERS:    HsT 7             Date of Admission:    Patient is a 43y old  Female who presents with a chief complaint of Shortness of Breath (12 Aug 2023 20:59)      HISTORY OF PRESENT ILLNESS:   Pt is a 44 y/o female with medical hx including pulmonary HTN c/b right heart failure, multiple abdominal wall abscesses, right atrial thrombus s/p thrombectomy, asthma, anemia presenting with 1 day of shortness of breath and palpitations. This morning, while walking to the fridge in her kitchen, she became increasingly short of breath and noticed a fast heart rate. Her dyspnea worsened until she decided to present to the ED. She has had similar episodes in the past in setting of her pulmonary HTN and right heart failure. She denies any chest pain or pressure, but endorses some tightness which she feels is similar to her baseline chest tightness 2/2 asthma. She also notes cough productive of clear sputum over the last week. Denies any fevers/chills, abdominal pain, chest pain, hemoptysis, leg swelling, vomiting, or diarrhea. Endorses some nausea at baseline but denies any increase today. Also endorses headache. Patient was last admitted 2 months ago with acute on chronic right heart failure exacerbation.      Allergies  vancomycin (Rash)  adhesives (Rash)  penicillin (Rash)    Intolerances  albuterol (Other; Rash)  	    MEDICATIONS:  furosemide   Injectable 40 milliGRAM(s) IV Push daily  rivaroxaban 20 milliGRAM(s) Oral daily  sildenafil (REVATIO) 20 milliGRAM(s) Oral every 8 hours  spironolactone 25 milliGRAM(s) Oral daily      ipratropium    for Nebulization 500 MICROGram(s) Nebulizer every 6 hours PRN      pantoprazole    Tablet 40 milliGRAM(s) Oral before breakfast    predniSONE   Tablet 10 milliGRAM(s) Oral daily        PAST MEDICAL & SURGICAL HISTORY:  Tachycardia      Anemia      Pulmonary hypertension      Pulmonary embolism      Asthma  On home O2 nightly at 2L via NC      PE (pulmonary thromboembolism)  on Xarelto 10 mg daily      Sinusitis      Corneal disorder      Right heart failure      CAD (coronary artery disease)      H/O pulmonary hypertension      Pulmonary embolism      Asthma      History of tachycardia      On supplemental oxygen by nasal cannula  O2 @ 2L      Pacemaker      Skin mass  left upper thigh mass      History of tubal ligation      Pacemaker      H/O tubal ligation      History of pacemaker  12/19 - [employer] model Ingevity 4458          FAMILY HISTORY:  Family history of diabetes mellitus  in brother    Family history of diabetes mellitus in mother    FH: anemia        SOCIAL HISTORY:    [ ] Non-smoker  [ ] Smoker  [ ] Alcohol      REVIEW OF SYSTEMS:    CONSTITUTIONAL: No weakness, fevers or chills  EYES/ENT: No visual changes;  No dysphagia  NECK: No pain or stiffness  RESPIRATORY: No cough, wheezing, hemoptysis;   CARDIOVASCULAR: No chest pain   GASTROINTESTINAL: No abdominal or epigastric pain. No nausea, vomiting, or hematemesis; No diarrhea or constipation. No melena or hematochezia.  BACK: No back pain  GENITOURINARY: No dysuria, frequency or hematuria  NEUROLOGICAL: No numbness or weakness  SKIN: No itching, burning, rashes, or lesions   All other review of systems is negative unless indicated above.  PHYSICAL EXAM:  T(C): 36.8 (08-13-23 @ 04:48), Max: 36.9 (08-12-23 @ 17:20)  HR: 100 (08-13-23 @ 04:48) (80 - 101)  BP: 100/70 (08-13-23 @ 04:48) (99/75 - 123/81)  RR: 18 (08-13-23 @ 04:48) (16 - 20)  SpO2: 96% (08-13-23 @ 04:48) (94% - 99%)  Wt(kg): --  I&O's Summary      Appearance: Normal	  HEENT:   Normal oral mucosa, PERRL, EOMI	  Lymphatic: No lymphadenopathy  Cardiovascular: Normal S1 S2, +JVD, No murmurs, Trace LE edema  Respiratory: Diminished breath sounds at the bases   Psychiatry: A & O x 3, Mood & affect appropriate  Gastrointestinal:  Soft, Non-tender, + BS	  Skin: No rashes, No ecchymoses, No cyanosis	  Neurologic: Non-focal  Extremities: Normal range of motion, No clubbing, cyanosis   Vascular: Peripheral pulses palpable 2+ bilaterally        LABS:	 	    CBC Full  -  ( 12 Aug 2023 10:04 )  WBC Count : 10.55 K/uL  Hemoglobin : 13.5 g/dL  Hematocrit : 44.3 %  Platelet Count - Automated : 413 K/uL  Mean Cell Volume : 66.7 fl  Mean Cell Hemoglobin : 20.3 pg  Mean Cell Hemoglobin Concentration : 30.5 gm/dL  Auto Neutrophil # : 6.91 K/uL  Auto Lymphocyte # : 2.46 K/uL  Auto Monocyte # : 0.55 K/uL  Auto Eosinophil # : 0.27 K/uL  Auto Basophil # : 0.36 K/uL  Auto Neutrophil % : 65.5 %  Auto Lymphocyte % : 23.3 %  Auto Monocyte % : 5.2 %  Auto Eosinophil % : 2.6 %  Auto Basophil % : 3.4 %    08-12    139  |  106  |  16  ----------------------------<  77  3.9   |  18<L>  |  0.95    Ca    8.9      12 Aug 2023 10:04    TPro  7.1  /  Alb  4.3  /  TBili  0.7  /  DBili  x   /  AST  10  /  ALT  8<L>  /  AlkPhos  64  08-12      proBNP: 336  Lipid Profile:   HgA1c:   TSH:       CARDIAC MARKERS:    HsT 7

## 2023-08-13 NOTE — CONSULT NOTE ADULT - PROBLEM SELECTOR RECOMMENDATION 3
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   - Currently on Xarelto 20 mg daily.

## 2023-08-13 NOTE — CONSULT NOTE ADULT - SUBJECTIVE AND OBJECTIVE BOX
HPI:  42 yo F with PMHx pulm HTN 2/2 PAH (WHO Group I, Class III) on treprostinil SQ with chronic hypoxemic respiratory failure (2L NC ATC) and hx tachycardia s/p PPM who presented to [facility] with leg swelling. She was recently admitted with similar symptoms. She was diuresed with IV medication and remodulin was uptitrated.     Pt follows with Dr. Yoon for pulmonary HTN. Her pulmonary HTN is chronically managed with Sildenafil 20mg TID + Remodulin SQ 46ng/kg/mg (recently uptitrated 6/2023 during inpatient admission), and prednisone 10mg QD as well as diuresis with spironolactone 25mg QD and furosemide 40mg every other day.       PAST MEDICAL & SURGICAL HISTORY:  Tachycardia      Anemia      Pulmonary hypertension      Pulmonary embolism      Asthma  On home O2 nightly at 2L via NC      PE (pulmonary thromboembolism)  on Xarelto 10 mg daily      Sinusitis      Corneal disorder      Right heart failure      CAD (coronary artery disease)      H/O pulmonary hypertension      Pulmonary embolism      Asthma      History of tachycardia      On supplemental oxygen by nasal cannula  O2 @ 2L      Pacemaker      Skin mass  left upper thigh mass      History of tubal ligation      Pacemaker      H/O tubal ligation      History of pacemaker  12/19 - [employer] model Ingevity 4469          FAMILY HISTORY:  Family history of diabetes mellitus  in brother    Family history of diabetes mellitus in mother    FH: anemia        SOCIAL HISTORY:  Smoking: [ ] Never Smoked [ ] Former Smoker (__ packs x ___ years) [ ] Current Smoker  (__ packs x ___ years)  Substance Use: [ ] Never Used [ ] Used ____  EtOH Use:  Marital Status: [ ] Single [ ]  [ ]  [ ]   Sexual History:   Occupation:  Recent Travel:  Country of Birth:  Advance Directives:    Allergies    vancomycin (Rash)  adhesives (Rash)  penicillin (Rash)    Intolerances    albuterol (Other; Rash)      HOME MEDICATIONS:  Home Medications:  furosemide 40 mg oral tablet: 1 tab(s) orally every other day (12 Aug 2023 12:29)  omeprazole 40 mg oral delayed release capsule: 1 cap(s) orally once a day (12 Aug 2023 12:28)  Remodulin 5 mg/mL injectable solution: patient is taking 46ng/kg/min via her own SQ PUMP (12 Aug 2023 12:28)  spironolactone 25 mg oral tablet: 1 tab(s) orally once a day NOTE: As per Pharmacy, last filled was May 2023 for 30 days supply (12 Aug 2023 12:30)      REVIEW OF SYSTEMS:  All systems negative except as documented above.    OBJECTIVE:  ICU Vital Signs Last 24 Hrs  T(C): 36.8 (13 Aug 2023 04:48), Max: 36.9 (12 Aug 2023 17:20)  T(F): 98.2 (13 Aug 2023 04:48), Max: 98.4 (12 Aug 2023 17:20)  HR: 100 (13 Aug 2023 04:48) (80 - 101)  BP: 100/70 (13 Aug 2023 04:48) (99/75 - 123/81)  BP(mean): --  ABP: --  ABP(mean): --  RR: 18 (13 Aug 2023 04:48) (17 - 20)  SpO2: 96% (13 Aug 2023 04:48) (95% - 99%)    O2 Parameters below as of 13 Aug 2023 04:48  Patient On (Oxygen Delivery Method): nasal cannula              08-12 @ 07:01  -  08-13 @ 07:00  --------------------------------------------------------  IN: 0 mL / OUT: 550 mL / NET: -550 mL      CAPILLARY BLOOD GLUCOSE          PHYSICAL EXAM:  General: NAD  HEENT: EOMI, sclera anicteric  Neck: supple  Cardiovascular: RR  Respiratory: CTAB, no wheezes, crackles, or rhonci  Abdomen: soft  Extremities: warm and well perfused, no edema, no clubbing  Skin: no rashes  Neurological: AOx3, no focal deficits    HOSPITAL MEDICATIONS:  Standing Meds:  furosemide   Injectable 40 milliGRAM(s) IV Push daily  pantoprazole    Tablet 40 milliGRAM(s) Oral before breakfast  predniSONE   Tablet 10 milliGRAM(s) Oral daily  Remodulin (Treprostinil) SubQ Infusion 1 Dose(s) 1 Dose(s) SubCutaneous Continuous Pump  rivaroxaban 20 milliGRAM(s) Oral daily  sildenafil (REVATIO) 20 milliGRAM(s) Oral every 8 hours  spironolactone 25 milliGRAM(s) Oral daily      PRN Meds:  acetaminophen     Tablet .. 650 milliGRAM(s) Oral every 6 hours PRN  ipratropium    for Nebulization 500 MICROGram(s) Nebulizer every 6 hours PRN      LABS:                        14.4   10.50 )-----------( 443      ( 13 Aug 2023 07:11 )             47.5     Hgb Trend: 14.4<--, 13.5<--  08-13    137  |  102  |  13  ----------------------------<  78  3.6   |  19<L>  |  0.95    Ca    8.6      13 Aug 2023 07:14  Phos  4.4     08-13  Mg     1.8     08-13    TPro  7.1  /  Alb  4.3  /  TBili  0.7  /  DBili  x   /  AST  10  /  ALT  8<L>  /  AlkPhos  64  08-12    Creatinine Trend: 0.95<--, 0.95<--    Urinalysis Basic - ( 13 Aug 2023 07:14 )    Color: x / Appearance: x / SG: x / pH: x  Gluc: 78 mg/dL / Ketone: x  / Bili: x / Urobili: x   Blood: x / Protein: x / Nitrite: x   Leuk Esterase: x / RBC: x / WBC x   Sq Epi: x / Non Sq Epi: x / Bacteria: x        Venous Blood Gas:  08-12 @ 09:15  7.35/38/42/21/66.9  VBG Lactate: 0.7      MICROBIOLOGY:       RADIOLOGY:  [x] Reviewed and interpreted by me     HPI:  42 yo F with PMHx pulm HTN 2/2 PAH (WHO Group I, Class III) on treprostinil SQ (with hx SQ site infection) with chronic hypoxemic respiratory failure (2L NC ATC) and hx tachycardia s/p PPM who presented to [facility] with leg swelling. She was recently admitted with similar symptoms. She was diuresed with IV medication and remodulin was uptitrated.   Reports that she had leg swelling and cough. She became SOB and came to ED for further evaluation. Feels that this is similar to prior times when she needed IV diuresis. She has cough with clear sputum for 1 week. No fevers or chills.     In ED, patient's labs were significant for WBC of 10.5, Hgb of 13.5, BNP of 336 (down from 784), troponin of 7, EKG w/ NSR and LVH, and negative RVP. Chest x-ray was obtained and showed pulmonary vascular congestion.   Pt follows with Dr. Yoon for pulmonary HTN. Her pulmonary HTN is chronically managed with Sildenafil 20mg TID + Remodulin SQ 46ng/kg/mg (recently uptitrated 6/2023 during inpatient admission), and prednisone 10mg QD as well as diuresis with spironolactone 25mg QD and furosemide 40mg every other day.           PAST MEDICAL & SURGICAL HISTORY:  Tachycardia      Anemia      Pulmonary hypertension      Pulmonary embolism      Asthma  On home O2 nightly at 2L via NC      PE (pulmonary thromboembolism)  on Xarelto 10 mg daily      Sinusitis      Corneal disorder      Right heart failure      CAD (coronary artery disease)      H/O pulmonary hypertension      Pulmonary embolism      Asthma      History of tachycardia      On supplemental oxygen by nasal cannula  O2 @ 2L      Pacemaker      Skin mass  left upper thigh mass      History of tubal ligation      Pacemaker      H/O tubal ligation      History of pacemaker  12/19 - [employer] model Ingevity 4421          FAMILY HISTORY:  Family history of diabetes mellitus  in brother    Family history of diabetes mellitus in mother    FH: anemia        SOCIAL HISTORY:  Smoking: [ x] Never Smoked [ ] Former Smoker (__ packs x ___ years) [ ] Current Smoker  (__ packs x ___ years)  Substance Use: [ ] Never Used [ ] Used ____  EtOH Use:  Marital Status: [ ] Single [ ]  [ ]  [ ]   Sexual History:   Occupation:  Recent Travel: none  Country of Birth:  Advance Directives:    Allergies    vancomycin (Rash)  adhesives (Rash)  penicillin (Rash)    Intolerances    albuterol (Other; Rash)      HOME MEDICATIONS:  Home Medications:  furosemide 40 mg oral tablet: 1 tab(s) orally every other day (12 Aug 2023 12:29)  omeprazole 40 mg oral delayed release capsule: 1 cap(s) orally once a day (12 Aug 2023 12:28)  Remodulin 5 mg/mL injectable solution: patient is taking 46ng/kg/min via her own SQ PUMP (12 Aug 2023 12:28)  spironolactone 25 mg oral tablet: 1 tab(s) orally once a day NOTE: As per Pharmacy, last filled was May 2023 for 30 days supply (12 Aug 2023 12:30)      REVIEW OF SYSTEMS:  All systems negative except as documented above.    OBJECTIVE:  ICU Vital Signs Last 24 Hrs  T(C): 36.8 (13 Aug 2023 04:48), Max: 36.9 (12 Aug 2023 17:20)  T(F): 98.2 (13 Aug 2023 04:48), Max: 98.4 (12 Aug 2023 17:20)  HR: 100 (13 Aug 2023 04:48) (80 - 101)  BP: 100/70 (13 Aug 2023 04:48) (99/75 - 123/81)  BP(mean): --  ABP: --  ABP(mean): --  RR: 18 (13 Aug 2023 04:48) (17 - 20)  SpO2: 96% (13 Aug 2023 04:48) (95% - 99%)    O2 Parameters below as of 13 Aug 2023 04:48  Patient On (Oxygen Delivery Method): nasal cannula              08-12 @ 07:01  -  08-13 @ 07:00  --------------------------------------------------------  IN: 0 mL / OUT: 550 mL / NET: -550 mL      CAPILLARY BLOOD GLUCOSE          PHYSICAL EXAM:  General: NAD  HEENT: EOMI, sclera anicteric  Neck: supple  Cardiovascular: RR  Respiratory: CTAB, no wheezes, crackles, or rhonci  Abdomen: soft  Extremities: warm and well perfused, no edema, no clubbing  Skin: no rashes  Neurological: AOx3, no focal deficits    HOSPITAL MEDICATIONS:  Standing Meds:  furosemide   Injectable 40 milliGRAM(s) IV Push daily  pantoprazole    Tablet 40 milliGRAM(s) Oral before breakfast  predniSONE   Tablet 10 milliGRAM(s) Oral daily  Remodulin (Treprostinil) SubQ Infusion 1 Dose(s) 1 Dose(s) SubCutaneous Continuous Pump  rivaroxaban 20 milliGRAM(s) Oral daily  sildenafil (REVATIO) 20 milliGRAM(s) Oral every 8 hours  spironolactone 25 milliGRAM(s) Oral daily      PRN Meds:  acetaminophen     Tablet .. 650 milliGRAM(s) Oral every 6 hours PRN  ipratropium    for Nebulization 500 MICROGram(s) Nebulizer every 6 hours PRN      LABS:                        14.4   10.50 )-----------( 443      ( 13 Aug 2023 07:11 )             47.5     Hgb Trend: 14.4<--, 13.5<--  08-13    137  |  102  |  13  ----------------------------<  78  3.6   |  19<L>  |  0.95    Ca    8.6      13 Aug 2023 07:14  Phos  4.4     08-13  Mg     1.8     08-13    TPro  7.1  /  Alb  4.3  /  TBili  0.7  /  DBili  x   /  AST  10  /  ALT  8<L>  /  AlkPhos  64  08-12    Creatinine Trend: 0.95<--, 0.95<--    Urinalysis Basic - ( 13 Aug 2023 07:14 )    Color: x / Appearance: x / SG: x / pH: x  Gluc: 78 mg/dL / Ketone: x  / Bili: x / Urobili: x   Blood: x / Protein: x / Nitrite: x   Leuk Esterase: x / RBC: x / WBC x   Sq Epi: x / Non Sq Epi: x / Bacteria: x        Venous Blood Gas:  08-12 @ 09:15  7.35/38/42/21/66.9  VBG Lactate: 0.7      MICROBIOLOGY:       RADIOLOGY:  [x] Reviewed and interpreted by me

## 2023-08-14 LAB
ANION GAP SERPL CALC-SCNC: 15 MMOL/L — SIGNIFICANT CHANGE UP (ref 5–17)
BUN SERPL-MCNC: 15 MG/DL — SIGNIFICANT CHANGE UP (ref 7–23)
CALCIUM SERPL-MCNC: 8.4 MG/DL — SIGNIFICANT CHANGE UP (ref 8.4–10.5)
CHLORIDE SERPL-SCNC: 99 MMOL/L — SIGNIFICANT CHANGE UP (ref 96–108)
CO2 SERPL-SCNC: 21 MMOL/L — LOW (ref 22–31)
CREAT SERPL-MCNC: 1.07 MG/DL — SIGNIFICANT CHANGE UP (ref 0.5–1.3)
EGFR: 66 ML/MIN/1.73M2 — SIGNIFICANT CHANGE UP
GLUCOSE SERPL-MCNC: 89 MG/DL — SIGNIFICANT CHANGE UP (ref 70–99)
HCT VFR BLD CALC: 47.8 % — HIGH (ref 34.5–45)
HGB BLD-MCNC: 14.5 G/DL — SIGNIFICANT CHANGE UP (ref 11.5–15.5)
MAGNESIUM SERPL-MCNC: 1.8 MG/DL — SIGNIFICANT CHANGE UP (ref 1.6–2.6)
MCHC RBC-ENTMCNC: 20.3 PG — LOW (ref 27–34)
MCHC RBC-ENTMCNC: 30.3 GM/DL — LOW (ref 32–36)
MCV RBC AUTO: 66.9 FL — LOW (ref 80–100)
NRBC # BLD: 0 /100 WBCS — SIGNIFICANT CHANGE UP (ref 0–0)
PHOSPHATE SERPL-MCNC: 3.5 MG/DL — SIGNIFICANT CHANGE UP (ref 2.5–4.5)
PLATELET # BLD AUTO: 431 K/UL — HIGH (ref 150–400)
POTASSIUM SERPL-MCNC: 3.2 MMOL/L — LOW (ref 3.5–5.3)
POTASSIUM SERPL-SCNC: 3.2 MMOL/L — LOW (ref 3.5–5.3)
RBC # BLD: 7.15 M/UL — HIGH (ref 3.8–5.2)
RBC # FLD: 19.8 % — HIGH (ref 10.3–14.5)
SODIUM SERPL-SCNC: 135 MMOL/L — SIGNIFICANT CHANGE UP (ref 135–145)
WBC # BLD: 11.12 K/UL — HIGH (ref 3.8–10.5)
WBC # FLD AUTO: 11.12 K/UL — HIGH (ref 3.8–10.5)

## 2023-08-14 PROCEDURE — 93306 TTE W/DOPPLER COMPLETE: CPT | Mod: 26

## 2023-08-14 PROCEDURE — 99233 SBSQ HOSP IP/OBS HIGH 50: CPT | Mod: GC

## 2023-08-14 PROCEDURE — 99232 SBSQ HOSP IP/OBS MODERATE 35: CPT | Mod: GC

## 2023-08-14 RX ORDER — POTASSIUM CHLORIDE 20 MEQ
20 PACKET (EA) ORAL
Refills: 0 | Status: COMPLETED | OUTPATIENT
Start: 2023-08-14 | End: 2023-08-14

## 2023-08-14 RX ADMIN — Medication 20 MILLIGRAM(S): at 22:31

## 2023-08-14 RX ADMIN — Medication 20 MILLIGRAM(S): at 05:50

## 2023-08-14 RX ADMIN — PANTOPRAZOLE SODIUM 40 MILLIGRAM(S): 20 TABLET, DELAYED RELEASE ORAL at 05:49

## 2023-08-14 RX ADMIN — SPIRONOLACTONE 25 MILLIGRAM(S): 25 TABLET, FILM COATED ORAL at 10:07

## 2023-08-14 RX ADMIN — RIVAROXABAN 20 MILLIGRAM(S): KIT at 12:42

## 2023-08-14 RX ADMIN — Medication 10 MILLIGRAM(S): at 05:50

## 2023-08-14 RX ADMIN — Medication 20 MILLIGRAM(S): at 12:43

## 2023-08-14 RX ADMIN — Medication 40 MILLIGRAM(S): at 05:50

## 2023-08-14 RX ADMIN — Medication 500 MICROGRAM(S): at 15:32

## 2023-08-14 RX ADMIN — Medication 20 MILLIEQUIVALENT(S): at 10:07

## 2023-08-14 RX ADMIN — Medication 20 MILLIEQUIVALENT(S): at 12:42

## 2023-08-14 RX ADMIN — Medication 650 MILLIGRAM(S): at 23:48

## 2023-08-14 RX ADMIN — Medication 500 MICROGRAM(S): at 06:39

## 2023-08-14 NOTE — PROGRESS NOTE ADULT - PROBLEM SELECTOR PLAN 1
- In s/o pulmonary HTN  - On remodulin, slidenafil, xarelto at home  - Lasix 40mg qid, spironolactone 25 mg qd at home  - On 2L O2 at home  - Patient likely volume-overloaded in s/o pulmonary HTN  - Chest x-ray with pulmonary arterial congestion  - EKG w/ NSR and RVH  - C/w diuresis with 40 mg IV lasix qd, plan to switch to qid dosing tomorrow (8/15), per pulm  - C/w home remodulin, slidenafil, xarelto, spironolactone, prednisone  - Pulm and cardiology consulted, will continue to appreciate recs  - Cardiology recommended TTE, currently pending  - Strict I&O, daily weights

## 2023-08-14 NOTE — PROGRESS NOTE ADULT - SUBJECTIVE AND OBJECTIVE BOX
CHIEF COMPLAINT:    Interval Events: Pt seen and examined at bedside.     REVIEW OF SYSTEMS:  Constitutional: No fevers or chills. No weight loss. No fatigue or generalised malaise.  Eyes: No itching or discharge from the eyes  ENT: No ear pain. No ear discharge. No nasal congestion. No post nasal drip. No epistaxis. No throat pain. No sore throat. No difficulty swallowing.   CV: No chest pain. No palpitations. No lightheadedness or dizziness.   Resp: No dyspnea at rest. No dyspnea on exertion. No orthopnea. No wheezing. No cough. No stridor. No sputum production. No chest pain with respiration.      OBJECTIVE:  ICU Vital Signs Last 24 Hrs  T(C): 36.8 (14 Aug 2023 04:49), Max: 36.8 (13 Aug 2023 13:00)  T(F): 98.2 (14 Aug 2023 04:49), Max: 98.2 (13 Aug 2023 13:00)  HR: 105 (14 Aug 2023 04:49) (100 - 105)  BP: 113/77 (14 Aug 2023 04:49) (101/62 - 113/77)  BP(mean): --  ABP: --  ABP(mean): --  RR: 18 (14 Aug 2023 04:49) (18 - 18)  SpO2: 93% (14 Aug 2023 04:49) (92% - 97%)    O2 Parameters below as of 14 Aug 2023 04:49  Patient On (Oxygen Delivery Method): nasal cannula  O2 Flow (L/min): 2            08-13 @ 07:01  -  08-14 @ 07:00  --------------------------------------------------------  IN: 0 mL / OUT: 300 mL / NET: -300 mL      CAPILLARY BLOOD GLUCOSE          PHYSICAL EXAM:  General: NAD  HEENT: EOMI, sclera anicteric  Neck: supple  Cardiovascular: RR  Respiratory: CTAB, no wheezes, crackles, or rhonci  Abdomen: soft  Extremities: warm and well perfused, no edema, no clubbing  Skin: no rashes  Neurological: AOx3, no focal deficits    HOSPITAL MEDICATIONS:  MEDICATIONS  (STANDING):  furosemide    Tablet 40 milliGRAM(s) Oral daily  pantoprazole    Tablet 40 milliGRAM(s) Oral before breakfast  potassium chloride    Tablet ER 20 milliEquivalent(s) Oral every 2 hours  predniSONE   Tablet 10 milliGRAM(s) Oral daily  Remodulin (Treprostinil) SubQ Infusion 1 Dose(s) 1 Dose(s) SubCutaneous Continuous Pump  rivaroxaban 20 milliGRAM(s) Oral daily  sildenafil (REVATIO) 20 milliGRAM(s) Oral every 8 hours  spironolactone 25 milliGRAM(s) Oral daily    MEDICATIONS  (PRN):  acetaminophen     Tablet .. 650 milliGRAM(s) Oral every 6 hours PRN Moderate Pain (4 - 6)  ipratropium    for Nebulization 500 MICROGram(s) Nebulizer every 6 hours PRN Asthma      LABS:                        14.5   11.12 )-----------( 431      ( 14 Aug 2023 07:25 )             47.8     08-14    135  |  99  |  15  ----------------------------<  89  3.2<L>   |  21<L>  |  1.07    Ca    8.4      14 Aug 2023 07:21  Phos  3.5     08-14  Mg     1.8     08-14    TPro  7.1  /  Alb  4.3  /  TBili  0.7  /  DBili  x   /  AST  10  /  ALT  8<L>  /  AlkPhos  64  08-12      Urinalysis Basic - ( 14 Aug 2023 07:21 )    Color: x / Appearance: x / SG: x / pH: x  Gluc: 89 mg/dL / Ketone: x  / Bili: x / Urobili: x   Blood: x / Protein: x / Nitrite: x   Leuk Esterase: x / RBC: x / WBC x   Sq Epi: x / Non Sq Epi: x / Bacteria: x        Venous Blood Gas:  08-12 @ 09:15  7.35/38/42/21/66.9  VBG Lactate: 0.7      MICROBIOLOGY:     RADIOLOGY:  [ ] Reviewed and interpreted by me    Point of Care Ultrasound Findings:    PFT:    EKG:

## 2023-08-14 NOTE — PROGRESS NOTE ADULT - TIME BILLING
chart and data review, clinical assessment, and coordination of care. This excludes any time spent on separate procedures or teaching.
- Ordering, reviewing, and interpreting labs, testing, and imaging.  - Independently obtaining a review of systems and performing a physical exam  - Reviewing prior records and where necessary, outpatient records.  - Counselling and educating patient and family regarding interpretation of aforementioned items and plan of care.  - Does not include time spent on resident education.

## 2023-08-14 NOTE — PROGRESS NOTE ADULT - ASSESSMENT
44 yo F with PMHx pulm HTN 2/2 PAH (WHO Group I, Class III) on treprostinil SQ (with hx SQ site infection) with chronic hypoxemic respiratory failure (2L NC ATC) and hx tachycardia s/p PPM who presented to [facility] with leg swelling. She was admitted for IV diuresis. Pulmonary consulted for further management.    # pulmonary hypertension (Group 1)  # asthma  Follows with Dr. Yoon, on treprostinil SQ, sildenafil at home  - remodulin site does not appear infected  - RVP negative  - continue treprostinil 46 ng/kg/mg and sildenafil 20 mg TID  - will consider increasing remodulin this admission  - continue xarelto (hx RA thrombus)  - continue prednisone 10mg  - continue atrovent prn   - can switch to PO diuresis tomorrow: lasix 40 mg every other day, spironolactone 25   - continue home 2L NC  - incentive spirometry, oob to chair, ambulation  - f/u TTE      Discussed with Dr. Yoon and Dr. Lutz 44 yo F with PMHx pulm HTN 2/2 PAH (WHO Group I, Class III) on treprostinil SQ (with hx SQ site infection) with chronic hypoxemic respiratory failure (2L NC ATC) and hx tachycardia s/p PPM who presented to [facility] with leg swelling. She was admitted for IV diuresis. Pulmonary consulted for further management.    # pulmonary hypertension (Group 1)  # asthma  Follows with Dr. Yoon, on treprostinil SQ, sildenafil at home  - remodulin site does not appear infected  - RVP negative  - continue treprostinil 46 ng/kg/mg and sildenafil 20 mg TID  - continue xarelto (hx RA thrombus)  - continue prednisone 10mg  - continue atrovent prn   - continue with PO diuresis  - continue home 2L NC    No pulmonary contraindication to discharge.       Discussed with Dr. Yoon and Dr. Lutz

## 2023-08-14 NOTE — PROGRESS NOTE ADULT - SUBJECTIVE AND OBJECTIVE BOX
***********************************************  Elvis Vaughn MD  Internal Medicine   PGY3  ***********************************************      PROGRESS NOTE:     Patient is a 43y old  Female who presents with a chief complaint of Shortness of Breath (13 Aug 2023 10:27)      SUBJECTIVE / OVERNIGHT EVENTS:    OVERNIGHT:       Patient examined at bedside        MEDICATIONS  (STANDING):  furosemide    Tablet 40 milliGRAM(s) Oral daily  pantoprazole    Tablet 40 milliGRAM(s) Oral before breakfast  predniSONE   Tablet 10 milliGRAM(s) Oral daily  Remodulin (Treprostinil) SubQ Infusion 1 Dose(s) 1 Dose(s) SubCutaneous Continuous Pump  rivaroxaban 20 milliGRAM(s) Oral daily  sildenafil (REVATIO) 20 milliGRAM(s) Oral every 8 hours  spironolactone 25 milliGRAM(s) Oral daily    MEDICATIONS  (PRN):  acetaminophen     Tablet .. 650 milliGRAM(s) Oral every 6 hours PRN Moderate Pain (4 - 6)  ipratropium    for Nebulization 500 MICROGram(s) Nebulizer every 6 hours PRN Asthma      CAPILLARY BLOOD GLUCOSE        I&O's Summary    13 Aug 2023 07:01  -  14 Aug 2023 07:00  --------------------------------------------------------  IN: 0 mL / OUT: 300 mL / NET: -300 mL        PHYSICAL EXAM:  Vital Signs Last 24 Hrs  T(C): 36.8 (14 Aug 2023 04:49), Max: 36.8 (13 Aug 2023 13:00)  T(F): 98.2 (14 Aug 2023 04:49), Max: 98.2 (13 Aug 2023 13:00)  HR: 105 (14 Aug 2023 04:49) (100 - 105)  BP: 113/77 (14 Aug 2023 04:49) (101/62 - 113/77)  BP(mean): --  RR: 18 (14 Aug 2023 04:49) (18 - 18)  SpO2: 93% (14 Aug 2023 04:49) (92% - 97%)    Parameters below as of 14 Aug 2023 04:49  Patient On (Oxygen Delivery Method): nasal cannula  O2 Flow (L/min): 2      CONSTITUTIONAL: NAD; well-developed  HEENT: PERRL, clear conjunctiva  RESPIRATORY: Normal respiratory effort; lungs are clear to auscultation bilaterally; No Crackles/Rhonchi/Wheezing  CARDIOVASCULAR: Regular rate and rhythm, normal S1 and S2, no murmur/rub/gallop; No lower extremity edema; Peripheral pulses are 2+ bilaterally  ABDOMEN: Nontender to palpation, normoactive bowel sounds, no rebound/guarding; No hepatosplenomegaly  MUSCULOSKELETAL: no clubbing or cyanosis of digits; no joint swelling or tenderness to palpation  EXTREMITY: Lower extremities Non-tender to palpation; non-erythematous B/L  NEURO: A&Ox3; no focal deficits   PSYCH: normal mood; Affect appropirate    LABS:                        14.4   10.50 )-----------( 443      ( 13 Aug 2023 07:11 )             47.5     08-13    137  |  102  |  13  ----------------------------<  78  3.6   |  19<L>  |  0.95    Ca    8.6      13 Aug 2023 07:14  Phos  4.4     08-13  Mg     1.8     08-13    TPro  7.1  /  Alb  4.3  /  TBili  0.7  /  DBili  x   /  AST  10  /  ALT  8<L>  /  AlkPhos  64  08-12          Urinalysis Basic - ( 13 Aug 2023 07:14 )    Color: x / Appearance: x / SG: x / pH: x  Gluc: 78 mg/dL / Ketone: x  / Bili: x / Urobili: x   Blood: x / Protein: x / Nitrite: x   Leuk Esterase: x / RBC: x / WBC x   Sq Epi: x / Non Sq Epi: x / Bacteria: x          RADIOLOGY & ADDITIONAL TESTS:  Results Reviewed:   Imaging Personally Reviewed:  Electrocardiogram Personally Reviewed:    COORDINATION OF CARE:  Care Discussed with Consultants/Other Providers [Y/N]:  Prior or Outpatient Records Reviewed [Y/N]:   PROGRESS NOTE:     Patient is a 43y old female who presents with a chief complaint of Shortness of Breath (13 Aug 2023 10:27)      SUBJECTIVE / OVERNIGHT EVENTS:    OVERNIGHT: No acute overnight events.      Patient was examined at bedside and feels well. Denies fever, chills, chest pain, SOB. ROS otherwise negative and pt is amenable to current treatment plan.        MEDICATIONS  (STANDING):  furosemide    Tablet 40 milliGRAM(s) Oral daily  pantoprazole    Tablet 40 milliGRAM(s) Oral before breakfast  predniSONE   Tablet 10 milliGRAM(s) Oral daily  Remodulin (Treprostinil) SubQ Infusion 1 Dose(s) 1 Dose(s) SubCutaneous Continuous Pump  rivaroxaban 20 milliGRAM(s) Oral daily  sildenafil (REVATIO) 20 milliGRAM(s) Oral every 8 hours  spironolactone 25 milliGRAM(s) Oral daily    MEDICATIONS  (PRN):  acetaminophen     Tablet .. 650 milliGRAM(s) Oral every 6 hours PRN Moderate Pain (4 - 6)  ipratropium    for Nebulization 500 MICROGram(s) Nebulizer every 6 hours PRN Asthma      CAPILLARY BLOOD GLUCOSE        I&O's Summary    13 Aug 2023 07:01  -  14 Aug 2023 07:00  --------------------------------------------------------  IN: 0 mL / OUT: 300 mL / NET: -300 mL        PHYSICAL EXAM:  Vital Signs Last 24 Hrs  T(C): 36.8 (14 Aug 2023 04:49), Max: 36.8 (13 Aug 2023 13:00)  T(F): 98.2 (14 Aug 2023 04:49), Max: 98.2 (13 Aug 2023 13:00)  HR: 105 (14 Aug 2023 04:49) (100 - 105)  BP: 113/77 (14 Aug 2023 04:49) (101/62 - 113/77)  BP(mean): --  RR: 18 (14 Aug 2023 04:49) (18 - 18)  SpO2: 93% (14 Aug 2023 04:49) (92% - 97%)    Parameters below as of 14 Aug 2023 04:49  Patient On (Oxygen Delivery Method): nasal cannula  O2 Flow (L/min): 2      CONSTITUTIONAL: NAD; well-developed  HEENT: PERRL, clear conjunctiva  RESPIRATORY: Normal respiratory effort; lungs are clear to auscultation bilaterally; No Crackles/Rhonchi/Wheezing  CARDIOVASCULAR: Regular rate and rhythm, normal S1 and S2, no murmur/rub/gallop; No lower extremity edema; Peripheral pulses are 2+ bilaterally  ABDOMEN: Nontender to palpation, normoactive bowel sounds, no rebound/guarding; No hepatosplenomegaly  MUSCULOSKELETAL: no clubbing or cyanosis of digits; no joint swelling or tenderness to palpation  EXTREMITY: Lower extremities Non-tender to palpation; non-erythematous B/L  NEURO: A&Ox3; no focal deficits   PSYCH: normal mood; Affect appropirate    LABS:                          14.5   11.12 )-----------( 431      ( 14 Aug 2023 07:25 )             47.8       08-14    135  |  99  |  15  ----------------------------<  89  3.2<L>   |  21<L>  |  1.07    Ca    8.4      14 Aug 2023 07:21  Phos  3.5     08-14  Mg     1.8     08-14                Urinalysis Basic - ( 14 Aug 2023 07:21 )    Color: x / Appearance: x / SG: x / pH: x  Gluc: 89 mg/dL / Ketone: x  / Bili: x / Urobili: x   Blood: x / Protein: x / Nitrite: x   Leuk Esterase: x / RBC: x / WBC x   Sq Epi: x / Non Sq Epi: x / Bacteria: x        CAPILLARY BLOOD GLUCOSE        RADIOLOGY & ADDITIONAL TESTS:  Results Reviewed:   Imaging Personally Reviewed:  Electrocardiogram Personally Reviewed:    COORDINATION OF CARE:  Care Discussed with Consultants/Other Providers [Y/N]:  Prior or Outpatient Records Reviewed [Y/N]:

## 2023-08-14 NOTE — PROGRESS NOTE ADULT - ASSESSMENT
Assessment:      Plan: Assessment:  43-year-old female w/ PMH of pulmonary HTN c/b right heart failure, multiple abdominal wall abscesses, right atrial thrombus s/p thrombectomy, asthma, anemia admitted for right heart failure exacerbation in s/o pulmonary HTN.

## 2023-08-15 LAB
ANION GAP SERPL CALC-SCNC: 16 MMOL/L — SIGNIFICANT CHANGE UP (ref 5–17)
BUN SERPL-MCNC: 15 MG/DL — SIGNIFICANT CHANGE UP (ref 7–23)
CALCIUM SERPL-MCNC: 8.5 MG/DL — SIGNIFICANT CHANGE UP (ref 8.4–10.5)
CHLORIDE SERPL-SCNC: 102 MMOL/L — SIGNIFICANT CHANGE UP (ref 96–108)
CO2 SERPL-SCNC: 17 MMOL/L — LOW (ref 22–31)
CREAT SERPL-MCNC: 0.93 MG/DL — SIGNIFICANT CHANGE UP (ref 0.5–1.3)
EGFR: 78 ML/MIN/1.73M2 — SIGNIFICANT CHANGE UP
GLUCOSE SERPL-MCNC: 74 MG/DL — SIGNIFICANT CHANGE UP (ref 70–99)
HCT VFR BLD CALC: 45.9 % — HIGH (ref 34.5–45)
HGB BLD-MCNC: 14 G/DL — SIGNIFICANT CHANGE UP (ref 11.5–15.5)
MAGNESIUM SERPL-MCNC: 1.9 MG/DL — SIGNIFICANT CHANGE UP (ref 1.6–2.6)
MCHC RBC-ENTMCNC: 20.2 PG — LOW (ref 27–34)
MCHC RBC-ENTMCNC: 30.5 GM/DL — LOW (ref 32–36)
MCV RBC AUTO: 66.2 FL — LOW (ref 80–100)
NRBC # BLD: 0 /100 WBCS — SIGNIFICANT CHANGE UP (ref 0–0)
PHOSPHATE SERPL-MCNC: 3.2 MG/DL — SIGNIFICANT CHANGE UP (ref 2.5–4.5)
PLATELET # BLD AUTO: 448 K/UL — HIGH (ref 150–400)
POTASSIUM SERPL-MCNC: 4.8 MMOL/L — SIGNIFICANT CHANGE UP (ref 3.5–5.3)
POTASSIUM SERPL-SCNC: 4.8 MMOL/L — SIGNIFICANT CHANGE UP (ref 3.5–5.3)
RBC # BLD: 6.93 M/UL — HIGH (ref 3.8–5.2)
RBC # FLD: 19.7 % — HIGH (ref 10.3–14.5)
SODIUM SERPL-SCNC: 135 MMOL/L — SIGNIFICANT CHANGE UP (ref 135–145)
WBC # BLD: 11.79 K/UL — HIGH (ref 3.8–10.5)
WBC # FLD AUTO: 11.79 K/UL — HIGH (ref 3.8–10.5)

## 2023-08-15 PROCEDURE — 99233 SBSQ HOSP IP/OBS HIGH 50: CPT | Mod: GC

## 2023-08-15 PROCEDURE — 99232 SBSQ HOSP IP/OBS MODERATE 35: CPT

## 2023-08-15 RX ORDER — SODIUM CHLORIDE 0.65 %
1 AEROSOL, SPRAY (ML) NASAL
Refills: 0 | Status: DISCONTINUED | OUTPATIENT
Start: 2023-08-15 | End: 2023-08-17

## 2023-08-15 RX ORDER — MAGNESIUM SULFATE 500 MG/ML
1 VIAL (ML) INJECTION ONCE
Refills: 0 | Status: COMPLETED | OUTPATIENT
Start: 2023-08-15 | End: 2023-08-15

## 2023-08-15 RX ADMIN — Medication 40 MILLIGRAM(S): at 05:24

## 2023-08-15 RX ADMIN — PANTOPRAZOLE SODIUM 40 MILLIGRAM(S): 20 TABLET, DELAYED RELEASE ORAL at 05:25

## 2023-08-15 RX ADMIN — SPIRONOLACTONE 25 MILLIGRAM(S): 25 TABLET, FILM COATED ORAL at 09:58

## 2023-08-15 RX ADMIN — Medication 20 MILLIGRAM(S): at 13:11

## 2023-08-15 RX ADMIN — Medication 20 MILLIGRAM(S): at 05:24

## 2023-08-15 RX ADMIN — Medication 20 MILLIGRAM(S): at 21:46

## 2023-08-15 RX ADMIN — Medication 100 GRAM(S): at 09:57

## 2023-08-15 RX ADMIN — Medication 650 MILLIGRAM(S): at 00:33

## 2023-08-15 RX ADMIN — Medication 1 SPRAY(S): at 22:40

## 2023-08-15 RX ADMIN — Medication 500 MICROGRAM(S): at 19:01

## 2023-08-15 RX ADMIN — RIVAROXABAN 20 MILLIGRAM(S): KIT at 13:11

## 2023-08-15 RX ADMIN — Medication 10 MILLIGRAM(S): at 05:24

## 2023-08-15 RX ADMIN — Medication 500 MICROGRAM(S): at 05:47

## 2023-08-15 NOTE — PROGRESS NOTE ADULT - ASSESSMENT
Assessment:  43-year-old female w/ PMH of pulmonary HTN c/b right heart failure, multiple abdominal wall abscesses, right atrial thrombus s/p thrombectomy, asthma, anemia admitted for right heart failure exacerbation in s/o pulmonary HTN.

## 2023-08-15 NOTE — PROGRESS NOTE ADULT - PROBLEM SELECTOR PLAN 1
Right heart failure with normal LVEF  - Continue Lasix 40mg PO qD, consider increasing to 40 BID if not net 1L negative   - Continue spironolactone 25 mg daily  - repeat TTE w/ RV overload, normal LVSF   - Maintain Strict I/O's   - Daily standing weights  - Replete electrolytes to K>4.0 and Mg >2.5 Right heart failure with normal LVEF  - Continue Lasix 40mg PO qD  - Continue spironolactone 25 mg daily  - repeat TTE w/ RV overload, normal LVSF   - Maintain Strict I/O's   - Daily standing weights  - Replete electrolytes to K>4.0 and Mg >2.5  - plan for RHC

## 2023-08-15 NOTE — PROGRESS NOTE ADULT - SUBJECTIVE AND OBJECTIVE BOX
PROGRESS NOTE:     Patient is a 43y old  Female who presents with a chief complaint of Shortness of Breath (14 Aug 2023 09:01)      SUBJECTIVE / OVERNIGHT EVENTS:    OVERNIGHT: No acute overnight events.      Patient was examined at bedside and feels well. She endorses minor SOB similar to her baseline. Denies fever, chills, chest pain, nausea, vomiting. ROS otherwise negative and pt is amenable to current treatment plan.      REVIEW OF SYSTEMS:    CONSTITUTIONAL:  No weakness, fevers or chills  EYES/ENT:  No visual changes;  No vertigo or throat pain   NECK:  No pain or stiffness  RESPIRATORY:  No cough, wheezing, hemoptysis; Mild shortness of breath  CARDIOVASCULAR:  No chest pain or palpitations  GASTROINTESTINAL:  No abdominal or epigastric pain. No nausea, vomiting, or hematemesis; No diarrhea or constipation. No melena or hematochezia.  GENITOURINARY:  No dysuria, frequency or hematuria  NEUROLOGICAL:  No numbness or weakness  SKIN:  No itching, rashes      MEDICATIONS  (STANDING):  furosemide    Tablet 40 milliGRAM(s) Oral daily  magnesium sulfate  IVPB 1 Gram(s) IV Intermittent once  pantoprazole    Tablet 40 milliGRAM(s) Oral before breakfast  predniSONE   Tablet 10 milliGRAM(s) Oral daily  Remodulin (Treprostinil) SubQ Infusion 1 Dose(s) 1 Dose(s) SubCutaneous Continuous Pump  rivaroxaban 20 milliGRAM(s) Oral daily  sildenafil (REVATIO) 20 milliGRAM(s) Oral every 8 hours  spironolactone 25 milliGRAM(s) Oral daily    MEDICATIONS  (PRN):  acetaminophen     Tablet .. 650 milliGRAM(s) Oral every 6 hours PRN Moderate Pain (4 - 6)  ipratropium    for Nebulization 500 MICROGram(s) Nebulizer every 6 hours PRN Asthma      CAPILLARY BLOOD GLUCOSE        I&O's Summary      PHYSICAL EXAM:  Vital Signs Last 24 Hrs  T(C): 36.8 (15 Aug 2023 05:24), Max: 36.9 (14 Aug 2023 21:08)  T(F): 98.3 (15 Aug 2023 05:24), Max: 98.4 (14 Aug 2023 21:08)  HR: 112 (15 Aug 2023 05:24) (104 - 112)  BP: 103/67 (15 Aug 2023 05:24) (101/69 - 107/73)  BP(mean): --  RR: 18 (15 Aug 2023 05:24) (18 - 18)  SpO2: 95% (15 Aug 2023 05:24) (95% - 97%)    Parameters below as of 15 Aug 2023 05:24  Patient On (Oxygen Delivery Method): nasal cannula  O2 Flow (L/min): 2      CONSTITUTIONAL: NAD; well-developed  HEENT: PERRL, clear conjunctiva  RESPIRATORY: Normal respiratory effort; lungs are clear to auscultation bilaterally; No Crackles/Rhonchi/Wheezing  CARDIOVASCULAR: Regular rate and rhythm, normal S1 and S2, no murmur/rub/gallop; No lower extremity edema; Peripheral pulses are 2+ bilaterally  ABDOMEN: Nontender to palpation, normoactive bowel sounds, no rebound/guarding; No hepatosplenomegaly  MUSCULOSKELETAL: no clubbing or cyanosis of digits; no joint swelling or tenderness to palpation  EXTREMITY: Lower extremities Non-tender to palpation; non-erythematous B/L  NEURO: A&Ox3; no focal deficits   PSYCH: normal mood; Affect appropriate    LABS:                        14.5   11.12 )-----------( 431      ( 14 Aug 2023 07:25 )             47.8     08-15    135  |  102  |  15  ----------------------------<  74  4.8   |  17<L>  |  0.93    Ca    8.5      15 Aug 2023 06:57  Phos  3.2     08-15  Mg     1.9     08-15            Urinalysis Basic - ( 15 Aug 2023 06:57 )    Color: x / Appearance: x / SG: x / pH: x  Gluc: 74 mg/dL / Ketone: x  / Bili: x / Urobili: x   Blood: x / Protein: x / Nitrite: x   Leuk Esterase: x / RBC: x / WBC x   Sq Epi: x / Non Sq Epi: x / Bacteria: x          RADIOLOGY & ADDITIONAL TESTS:   PROGRESS NOTE:     Patient is a 43y old  Female who presents with a chief complaint of Shortness of Breath (14 Aug 2023 09:01)      SUBJECTIVE / OVERNIGHT EVENTS:    OVERNIGHT: No acute overnight events.      Patient was examined at bedside and feels well. She endorses minor SOB similar to her baseline. Denies fever, chills, chest pain, nausea, vomiting. ROS otherwise negative and pt is amenable to current treatment plan.      REVIEW OF SYSTEMS:    CONSTITUTIONAL:  No weakness, fevers or chills  EYES/ENT:  No visual changes;  No vertigo or throat pain   NECK:  No pain or stiffness  RESPIRATORY:  No cough, wheezing, hemoptysis; Mild shortness of breath  CARDIOVASCULAR:  No chest pain or palpitations  GASTROINTESTINAL:  No abdominal or epigastric pain. No nausea, vomiting, or hematemesis; No diarrhea or constipation. No melena or hematochezia.  GENITOURINARY:  No dysuria, frequency or hematuria  NEUROLOGICAL:  No numbness or weakness  SKIN:  No itching, rashes      MEDICATIONS  (STANDING):  furosemide    Tablet 40 milliGRAM(s) Oral daily  magnesium sulfate  IVPB 1 Gram(s) IV Intermittent once  pantoprazole    Tablet 40 milliGRAM(s) Oral before breakfast  predniSONE   Tablet 10 milliGRAM(s) Oral daily  Remodulin (Treprostinil) SubQ Infusion 1 Dose(s) 1 Dose(s) SubCutaneous Continuous Pump  rivaroxaban 20 milliGRAM(s) Oral daily  sildenafil (REVATIO) 20 milliGRAM(s) Oral every 8 hours  spironolactone 25 milliGRAM(s) Oral daily    MEDICATIONS  (PRN):  acetaminophen     Tablet .. 650 milliGRAM(s) Oral every 6 hours PRN Moderate Pain (4 - 6)  ipratropium    for Nebulization 500 MICROGram(s) Nebulizer every 6 hours PRN Asthma      CAPILLARY BLOOD GLUCOSE        I&O's Summary      PHYSICAL EXAM:  Vital Signs Last 24 Hrs  T(C): 36.8 (15 Aug 2023 05:24), Max: 36.9 (14 Aug 2023 21:08)  T(F): 98.3 (15 Aug 2023 05:24), Max: 98.4 (14 Aug 2023 21:08)  HR: 112 (15 Aug 2023 05:24) (104 - 112)  BP: 103/67 (15 Aug 2023 05:24) (101/69 - 107/73)  BP(mean): --  RR: 18 (15 Aug 2023 05:24) (18 - 18)  SpO2: 95% (15 Aug 2023 05:24) (95% - 97%)    Parameters below as of 15 Aug 2023 05:24  Patient On (Oxygen Delivery Method): nasal cannula  O2 Flow (L/min): 2      CONSTITUTIONAL: NAD; well-developed  HEENT: PERRL, clear conjunctiva  RESPIRATORY: Normal respiratory effort; lungs are clear to auscultation bilaterally; No Crackles/Rhonchi/Wheezing  CARDIOVASCULAR: Regular rate and rhythm, normal S1 and S2, no murmur/rub/gallop; No lower extremity edema; Peripheral pulses are 2+ bilaterally  ABDOMEN: Nontender to palpation, normoactive bowel sounds, no rebound/guarding; No hepatosplenomegaly  MUSCULOSKELETAL: no clubbing or cyanosis of digits; no joint swelling or tenderness to palpation  EXTREMITY: Lower extremities Non-tender to palpation; non-erythematous B/L  NEURO: A&Ox3; no focal deficits   PSYCH: normal mood; Affect appropriate    LABS:                        14.5   11.12 )-----------( 431      ( 14 Aug 2023 07:25 )             47.8     08-15    135  |  102  |  15  ----------------------------<  74  4.8   |  17<L>  |  0.93    Ca    8.5      15 Aug 2023 06:57  Phos  3.2     08-15  Mg     1.9     08-15            Urinalysis Basic - ( 15 Aug 2023 06:57 )    Color: x / Appearance: x / SG: x / pH: x  Gluc: 74 mg/dL / Ketone: x  / Bili: x / Urobili: x   Blood: x / Protein: x / Nitrite: x   Leuk Esterase: x / RBC: x / WBC x   Sq Epi: x / Non Sq Epi: x / Bacteria: x          RADIOLOGY & ADDITIONAL TESTS:    TTE (8/14)  CONCLUSIONS:      1. Right ventricular volume and pressure overload. Left ventricular systolic function is normal with an ejection fraction visually estimated at 55 to 60 %.   2. Mildly enlarged right ventricular cavity size and reduced systolic right ventricular function.   3. Device lead is visualized in the right heart.   4. Trace pericardial effusion.   5. Compared to the transthoracic echocardiogram performed on 6/8/2023 the current study has poor acoustic windows, which may limit interpretation.

## 2023-08-15 NOTE — PROGRESS NOTE ADULT - SUBJECTIVE AND OBJECTIVE BOX
ADVANCED HEART FAILURE & TRANSPLANT  - PROGRESS NOTE  *To reach the NS1 Team from 8am to 5pm, please call [phone].  NS2 [phone].      Ted Cooper MD   Internal Medicine, PGY 2  Contact via TEAMS.   _______________________________________________________________________________________________________    Subjective:      Medications:  acetaminophen     Tablet .. 650 milliGRAM(s) Oral every 6 hours PRN  furosemide    Tablet 40 milliGRAM(s) Oral daily  ipratropium    for Nebulization 500 MICROGram(s) Nebulizer every 6 hours PRN  pantoprazole    Tablet 40 milliGRAM(s) Oral before breakfast  predniSONE   Tablet 10 milliGRAM(s) Oral daily  Remodulin (Treprostinil) SubQ Infusion 1 Dose(s) 1 Dose(s) SubCutaneous Continuous Pump  rivaroxaban 20 milliGRAM(s) Oral daily  sildenafil (REVATIO) 20 milliGRAM(s) Oral every 8 hours  spironolactone 25 milliGRAM(s) Oral daily      ICU Vital Signs Last 24 Hrs  T(C): 36.8, Max: 36.9 (08-14 @ 21:08)  HR: 112 (104 - 112)  BP: 103/67 (101/69 - 107/73)  BP(mean): --  ABP: --  ABP(mean): --  RR: 18 (18 - 18)  SpO2: 95% (95% - 97%)    Weight in k.4 (-)  Weight in k.6 (23)      I&O's Summary Last 24 Hrs    Tele:    Physical Exam:    General: No distress. Comfortable.  HEENT: EOM intact.  Neck: JVP not elevated.  Respiratory: Clear to auscultation bilaterally  CV: RRR. Normal S1 and S2. No murmurs, rub, or gallops. Radial pulses normal.  Abdomen: Soft, non-distended, non-tender  Skin: No skin lesions  Extremities: Warm, no edema  Neurology: Non-focal, alert and oriented times three.   Psych: Affect normal    Labs:    ( 23 @ 07:25 )               14.5   11.12 )--------( 431                  47.8     ( 08-15-23 @ 06:57 )     135  |  102  |  15  ---------------------<  74  4.8  |  17  |  0.93    Ca 8.5  Phos 3.2  Mg 1.9        ( 23 @ 10:04 )  TropHS 7     / CK x     / CKMB x                   ADVANCED HEART FAILURE & TRANSPLANT  - PROGRESS NOTE  *To reach the NS1 Team from 8am to 5pm, please call [phone].  NS2 [phone].      Ted Cooper MD   Internal Medicine, PGY 2  Contact via TEAMS.   _______________________________________________________________________________________________________    Subjective: No acute overnight events.    Pt endorses dyspnea on exertion slightly worse than baseline. Otherwise denies fever, chills, chest pain, abdominal pain, dysuria, hematuria.       Medications:  acetaminophen     Tablet .. 650 milliGRAM(s) Oral every 6 hours PRN  furosemide    Tablet 40 milliGRAM(s) Oral daily  ipratropium    for Nebulization 500 MICROGram(s) Nebulizer every 6 hours PRN  pantoprazole    Tablet 40 milliGRAM(s) Oral before breakfast  predniSONE   Tablet 10 milliGRAM(s) Oral daily  Remodulin (Treprostinil) SubQ Infusion 1 Dose(s) 1 Dose(s) SubCutaneous Continuous Pump  rivaroxaban 20 milliGRAM(s) Oral daily  sildenafil (REVATIO) 20 milliGRAM(s) Oral every 8 hours  spironolactone 25 milliGRAM(s) Oral daily      ICU Vital Signs Last 24 Hrs  T(C): 36.8, Max: 36.9 (-14 @ 21:08)  HR: 112 (104 - 112)  BP: 103/67 (101/69 - 107/73)  BP(mean): --  ABP: --  ABP(mean): --  RR: 18 (18 - 18)  SpO2: 95% (95% - 97%)    Weight in k.4 (-)  Weight in k.6 (23)      I&O's Summary Last 24 Hrs    Tele:    Physical Exam:    General: No distress. Comfortable.  HEENT: EOM intact.  Neck: JVP not elevated.  Respiratory: Clear to auscultation bilaterally  CV: RRR. Normal S1 and S2. No murmurs, rub, or gallops. Radial pulses normal.  Abdomen: Soft, non-distended, non-tender  Skin: No skin lesions  Extremities: Warm, no edema  Neurology: Non-focal, alert and oriented times three.   Psych: Affect normal    Labs:    ( 23 @ 07:25 )               14.5   11.12 )--------( 431                  47.8     ( 08-15-23 @ 06:57 )     135  |  102  |  15  ---------------------<  74  4.8  |  17  |  0.93    Ca 8.5  Phos 3.2  Mg 1.9        ( 23 @ 10:04 )  TropHS 7     / CK x     / CKMB x

## 2023-08-15 NOTE — PROGRESS NOTE ADULT - PROBLEM SELECTOR PLAN 1
- In s/o pulmonary HTN  - On remodulin, slidenafil, xarelto at home  - Lasix 40mg qid, spironolactone 25 mg qd at home  - On 2L O2 at home  - Patient likely volume-overloaded in s/o pulmonary HTN  - Chest x-ray with pulmonary arterial congestion  - EKG w/ NSR and RVH  - C/w diuresis with 40 mg IV lasix qid, per pulm  - C/w home remodulin, slidenafil, xarelto, spironolactone, prednisone  - TTE (8/14) -> EF 55-60%, RV overload  - Pulmonology and cardiology consulted, will continue to appreciate recs  - Strict I&O, daily weights - In s/o pulmonary HTN  - On remodulin, slidenafil, xarelto at home  - Lasix 40mg qid, spironolactone 25 mg qd at home  - On 2L O2 at home  - Patient likely volume-overloaded in s/o pulmonary HTN  - Chest x-ray with pulmonary arterial congestion  - EKG w/ NSR and RVH  - C/w diuresis with 40 mg IV lasix qd, per HF  - C/w home remodulin, slidenafil, xarelto, spironolactone, prednisone  - TTE (8/14) -> EF 55-60%, RV overload  - Pulmonology and HF consulted, will continue to appreciate recs  - Strict I&O, daily weights  - Planning for RHC

## 2023-08-15 NOTE — PROGRESS NOTE ADULT - ASSESSMENT
42 y/o female with medical hx including pulmonary HTN c/b right heart failure, multiple abdominal wall abscesses, right atrial thrombus s/p thrombectomy, asthma, anemia presenting with 1 day of shortness of breath and palpitations in the setting of right-sided heart failure and pulmonary hypertension.      Cardiac Studies  6/8 TTE LVIDd 3.4cm, LVEF 64%, interdeterminate DD, mod RVE, TAPSE 2.1cm, Trace MR, trace TR, mld-mod GA  8/2022 TTE revealed RA thrombus with concern for clot in transit. She underwent catheter directed partial  thrombus aspiration 8/31  Limited TTE 8/31 @10:30: LVIDd 3.2 cm, hyperdynamic LV, new echogenic mass in RA 1.8 cm x 3.1 cm likely representing clot in transit (not seen on TTE from 8/29), RVE with decreased RV systolic function, est PASP 60 mmHg  9/1/22 Cath for venous thrombectomy right arm   RHC 7/23/20 RA 4, RV 80/4, PA 80/40/57, PCWP 22, LVEDP 4; CO/CI 4/2.0; PVR 11 (using EDP 13).

## 2023-08-16 LAB
ANION GAP SERPL CALC-SCNC: 14 MMOL/L — SIGNIFICANT CHANGE UP (ref 5–17)
APTT BLD: 43.3 SEC — HIGH (ref 24.5–35.6)
BLD GP AB SCN SERPL QL: NEGATIVE — SIGNIFICANT CHANGE UP
BUN SERPL-MCNC: 16 MG/DL — SIGNIFICANT CHANGE UP (ref 7–23)
CALCIUM SERPL-MCNC: 8.8 MG/DL — SIGNIFICANT CHANGE UP (ref 8.4–10.5)
CHLORIDE SERPL-SCNC: 102 MMOL/L — SIGNIFICANT CHANGE UP (ref 96–108)
CO2 SERPL-SCNC: 20 MMOL/L — LOW (ref 22–31)
CREAT SERPL-MCNC: 1 MG/DL — SIGNIFICANT CHANGE UP (ref 0.5–1.3)
EGFR: 72 ML/MIN/1.73M2 — SIGNIFICANT CHANGE UP
GLUCOSE SERPL-MCNC: 85 MG/DL — SIGNIFICANT CHANGE UP (ref 70–99)
HCT VFR BLD CALC: 45.3 % — HIGH (ref 34.5–45)
HGB BLD-MCNC: 13.9 G/DL — SIGNIFICANT CHANGE UP (ref 11.5–15.5)
HGB FLD-MCNC: 14.7 G/DL — SIGNIFICANT CHANGE UP (ref 11.7–16.5)
INR BLD: 1.35 RATIO — HIGH (ref 0.85–1.18)
MAGNESIUM SERPL-MCNC: 2.1 MG/DL — SIGNIFICANT CHANGE UP (ref 1.6–2.6)
MCHC RBC-ENTMCNC: 20.6 PG — LOW (ref 27–34)
MCHC RBC-ENTMCNC: 30.7 GM/DL — LOW (ref 32–36)
MCV RBC AUTO: 67.2 FL — LOW (ref 80–100)
NRBC # BLD: 0 /100 WBCS — SIGNIFICANT CHANGE UP (ref 0–0)
OXYHGB MFR BLDMV: 63.7 % — LOW (ref 90–95)
PHOSPHATE SERPL-MCNC: 3.9 MG/DL — SIGNIFICANT CHANGE UP (ref 2.5–4.5)
PLATELET # BLD AUTO: 400 K/UL — SIGNIFICANT CHANGE UP (ref 150–400)
POTASSIUM SERPL-MCNC: 3.8 MMOL/L — SIGNIFICANT CHANGE UP (ref 3.5–5.3)
POTASSIUM SERPL-SCNC: 3.8 MMOL/L — SIGNIFICANT CHANGE UP (ref 3.5–5.3)
PROTHROM AB SERPL-ACNC: 14.7 SEC — HIGH (ref 9.5–13)
RBC # BLD: 6.74 M/UL — HIGH (ref 3.8–5.2)
RBC # FLD: 19.5 % — HIGH (ref 10.3–14.5)
RH IG SCN BLD-IMP: POSITIVE — SIGNIFICANT CHANGE UP
SAO2 % BLD: 64.8 % — SIGNIFICANT CHANGE UP (ref 60–90)
SODIUM SERPL-SCNC: 136 MMOL/L — SIGNIFICANT CHANGE UP (ref 135–145)
WBC # BLD: 11.14 K/UL — HIGH (ref 3.8–10.5)
WBC # FLD AUTO: 11.14 K/UL — HIGH (ref 3.8–10.5)

## 2023-08-16 PROCEDURE — 99152 MOD SED SAME PHYS/QHP 5/>YRS: CPT

## 2023-08-16 PROCEDURE — 93451 RIGHT HEART CATH: CPT | Mod: 26

## 2023-08-16 PROCEDURE — 99232 SBSQ HOSP IP/OBS MODERATE 35: CPT | Mod: GC

## 2023-08-16 RX ORDER — ACETAMINOPHEN 500 MG
1000 TABLET ORAL ONCE
Refills: 0 | Status: COMPLETED | OUTPATIENT
Start: 2023-08-16 | End: 2023-08-16

## 2023-08-16 RX ADMIN — Medication 20 MILLIGRAM(S): at 06:33

## 2023-08-16 RX ADMIN — Medication 1000 MILLIGRAM(S): at 23:50

## 2023-08-16 RX ADMIN — Medication 20 MILLIGRAM(S): at 14:51

## 2023-08-16 RX ADMIN — SPIRONOLACTONE 25 MILLIGRAM(S): 25 TABLET, FILM COATED ORAL at 09:26

## 2023-08-16 RX ADMIN — Medication 400 MILLIGRAM(S): at 23:03

## 2023-08-16 RX ADMIN — Medication 40 MILLIGRAM(S): at 06:33

## 2023-08-16 RX ADMIN — Medication 500 MICROGRAM(S): at 06:36

## 2023-08-16 RX ADMIN — Medication 20 MILLIGRAM(S): at 21:43

## 2023-08-16 RX ADMIN — PANTOPRAZOLE SODIUM 40 MILLIGRAM(S): 20 TABLET, DELAYED RELEASE ORAL at 06:34

## 2023-08-16 RX ADMIN — Medication 500 MICROGRAM(S): at 15:27

## 2023-08-16 RX ADMIN — Medication 10 MILLIGRAM(S): at 06:35

## 2023-08-16 NOTE — PROGRESS NOTE ADULT - PROBLEM SELECTOR PLAN 1
- In s/o pulmonary HTN  - On remodulin, slidenafil, xarelto at home  - Lasix 40mg qid, spironolactone 25 mg qd at home  - On 2L O2 at home  - Patient likely volume-overloaded in s/o pulmonary HTN  - Chest x-ray with pulmonary arterial congestion  - EKG w/ NSR and RVH  - C/w diuresis with 40 mg IV lasix qd, per HF  - C/w home remodulin, slidenafil, xarelto, spironolactone, prednisone  - TTE (8/14) -> EF 55-60%, RV overload  - Pulmonology and HF consulted, will continue to appreciate recs  - Strict I&O, daily weights  - Planning for RHC (8/16)

## 2023-08-16 NOTE — PROGRESS NOTE ADULT - SUBJECTIVE AND OBJECTIVE BOX
PROGRESS NOTE:     Patient is a 43y old  Female who presents with a chief complaint of Shortness of Breath (15 Aug 2023 10:52)      SUBJECTIVE / OVERNIGHT EVENTS:    OVERNIGHT: No acute overnight events.      Patient was examined at bedside and feels well. She denies fever, chills, chest pain, SOB. ROS otherwise negative and pt is amenable to current treatment plan.      REVIEW OF SYSTEMS:    CONSTITUTIONAL:  No weakness, fevers or chills  EYES/ENT:  No visual changes;  No vertigo or throat pain   NECK:  No pain or stiffness  RESPIRATORY:  No cough, wheezing, hemoptysis; No shortness of breath  CARDIOVASCULAR:  No chest pain or palpitations  GASTROINTESTINAL:  No abdominal or epigastric pain. No nausea, vomiting, or hematemesis; No diarrhea or constipation. No melena or hematochezia.  GENITOURINARY:  No dysuria, frequency or hematuria  NEUROLOGICAL:  No numbness or weakness  SKIN:  No itching, rashes      MEDICATIONS  (STANDING):  furosemide    Tablet 40 milliGRAM(s) Oral daily  pantoprazole    Tablet 40 milliGRAM(s) Oral before breakfast  predniSONE   Tablet 10 milliGRAM(s) Oral daily  Remodulin (Treprostinil) SubQ Infusion 1 Dose(s) 1 Dose(s) SubCutaneous Continuous Pump  sildenafil (REVATIO) 20 milliGRAM(s) Oral every 8 hours  spironolactone 25 milliGRAM(s) Oral daily    MEDICATIONS  (PRN):  acetaminophen     Tablet .. 650 milliGRAM(s) Oral every 6 hours PRN Moderate Pain (4 - 6)  ipratropium    for Nebulization 500 MICROGram(s) Nebulizer every 6 hours PRN Asthma  sodium chloride 0.65% Nasal 1 Spray(s) Both Nostrils every 2 hours PRN Nasal Congestion      CAPILLARY BLOOD GLUCOSE        I&O's Summary    15 Aug 2023 07:01  -  16 Aug 2023 07:00  --------------------------------------------------------  IN: 360 mL / OUT: 0 mL / NET: 360 mL        PHYSICAL EXAM:  Vital Signs Last 24 Hrs  T(C): 36.8 (16 Aug 2023 05:08), Max: 37.1 (15 Aug 2023 20:58)  T(F): 98.2 (16 Aug 2023 05:08), Max: 98.7 (15 Aug 2023 20:58)  HR: 110 (16 Aug 2023 06:37) (82 - 115)  BP: 117/69 (16 Aug 2023 06:37) (100/71 - 117/69)  BP(mean): --  RR: 18 (16 Aug 2023 05:08) (18 - 19)  SpO2: 94% (16 Aug 2023 05:08) (94% - 96%)    Parameters below as of 16 Aug 2023 05:08  Patient On (Oxygen Delivery Method): nasal cannula  O2 Flow (L/min): 2      CONSTITUTIONAL: NAD; well-developed  HEENT: PERRL, clear conjunctiva  RESPIRATORY: Normal respiratory effort; lungs are clear to auscultation bilaterally; No Crackles/Rhonchi/Wheezing  CARDIOVASCULAR: Regular rate and rhythm, normal S1 and S2, no murmur/rub/gallop; No lower extremity edema; Peripheral pulses are 2+ bilaterally  ABDOMEN: Nontender to palpation, normoactive bowel sounds, no rebound/guarding; No hepatosplenomegaly  MUSCULOSKELETAL: no clubbing or cyanosis of digits; no joint swelling or tenderness to palpation  EXTREMITY: Lower extremities Non-tender to palpation; non-erythematous B/L  NEURO: A&Ox3; no focal deficits   PSYCH: normal mood; Affect appropriate    LABS:                        13.9   11.14 )-----------( 400      ( 16 Aug 2023 06:48 )             45.3     08-16    136  |  102  |  16  ----------------------------<  85  3.8   |  20<L>  |  1.00    Ca    8.8      16 Aug 2023 06:50  Phos  3.9     08-16  Mg     2.1     08-16      PT/INR - ( 16 Aug 2023 06:49 )   PT: 14.7 sec;   INR: 1.35 ratio         PTT - ( 16 Aug 2023 06:49 )  PTT:43.3 sec      Urinalysis Basic - ( 16 Aug 2023 06:50 )    Color: x / Appearance: x / SG: x / pH: x  Gluc: 85 mg/dL / Ketone: x  / Bili: x / Urobili: x   Blood: x / Protein: x / Nitrite: x   Leuk Esterase: x / RBC: x / WBC x   Sq Epi: x / Non Sq Epi: x / Bacteria: x          RADIOLOGY & ADDITIONAL TESTS:    TTE (8/14)  CONCLUSIONS:      1. Right ventricular volume and pressure overload. Left ventricular systolic function is normal with an ejection fraction visually estimated at 55 to 60 %.   2. Mildly enlarged right ventricular cavity size and reduced systolic right ventricular function.   3. Device lead is visualized in the right heart.   4. Trace pericardial effusion.   5. Compared to the transthoracic echocardiogram performed on 6/8/2023 the current study has poor acoustic windows, which may limit interpretation.    RHC (8/16)  Pending

## 2023-08-17 ENCOUNTER — TRANSCRIPTION ENCOUNTER (OUTPATIENT)
Age: 43
End: 2023-08-17

## 2023-08-17 VITALS
OXYGEN SATURATION: 95 % | SYSTOLIC BLOOD PRESSURE: 110 MMHG | HEART RATE: 106 BPM | RESPIRATION RATE: 18 BRPM | DIASTOLIC BLOOD PRESSURE: 75 MMHG | TEMPERATURE: 97 F

## 2023-08-17 LAB
ANION GAP SERPL CALC-SCNC: 15 MMOL/L — SIGNIFICANT CHANGE UP (ref 5–17)
BUN SERPL-MCNC: 17 MG/DL — SIGNIFICANT CHANGE UP (ref 7–23)
CALCIUM SERPL-MCNC: 8.8 MG/DL — SIGNIFICANT CHANGE UP (ref 8.4–10.5)
CHLORIDE SERPL-SCNC: 99 MMOL/L — SIGNIFICANT CHANGE UP (ref 96–108)
CO2 SERPL-SCNC: 21 MMOL/L — LOW (ref 22–31)
CREAT SERPL-MCNC: 1.14 MG/DL — SIGNIFICANT CHANGE UP (ref 0.5–1.3)
EGFR: 61 ML/MIN/1.73M2 — SIGNIFICANT CHANGE UP
GLUCOSE SERPL-MCNC: 116 MG/DL — HIGH (ref 70–99)
HCT VFR BLD CALC: 45.9 % — HIGH (ref 34.5–45)
HGB BLD-MCNC: 13.8 G/DL — SIGNIFICANT CHANGE UP (ref 11.5–15.5)
MAGNESIUM SERPL-MCNC: 1.9 MG/DL — SIGNIFICANT CHANGE UP (ref 1.6–2.6)
MCHC RBC-ENTMCNC: 20 PG — LOW (ref 27–34)
MCHC RBC-ENTMCNC: 30.1 GM/DL — LOW (ref 32–36)
MCV RBC AUTO: 66.6 FL — LOW (ref 80–100)
NRBC # BLD: 0 /100 WBCS — SIGNIFICANT CHANGE UP (ref 0–0)
PHOSPHATE SERPL-MCNC: 2.5 MG/DL — SIGNIFICANT CHANGE UP (ref 2.5–4.5)
PLATELET # BLD AUTO: 415 K/UL — HIGH (ref 150–400)
POTASSIUM SERPL-MCNC: 4.3 MMOL/L — SIGNIFICANT CHANGE UP (ref 3.5–5.3)
POTASSIUM SERPL-SCNC: 4.3 MMOL/L — SIGNIFICANT CHANGE UP (ref 3.5–5.3)
RBC # BLD: 6.89 M/UL — HIGH (ref 3.8–5.2)
RBC # FLD: 19.5 % — HIGH (ref 10.3–14.5)
SODIUM SERPL-SCNC: 135 MMOL/L — SIGNIFICANT CHANGE UP (ref 135–145)
WBC # BLD: 9.95 K/UL — SIGNIFICANT CHANGE UP (ref 3.8–10.5)
WBC # FLD AUTO: 9.95 K/UL — SIGNIFICANT CHANGE UP (ref 3.8–10.5)

## 2023-08-17 PROCEDURE — 93306 TTE W/DOPPLER COMPLETE: CPT

## 2023-08-17 PROCEDURE — 86901 BLOOD TYPING SEROLOGIC RH(D): CPT

## 2023-08-17 PROCEDURE — 99239 HOSP IP/OBS DSCHRG MGMT >30: CPT | Mod: GC

## 2023-08-17 PROCEDURE — 83735 ASSAY OF MAGNESIUM: CPT

## 2023-08-17 PROCEDURE — 82947 ASSAY GLUCOSE BLOOD QUANT: CPT

## 2023-08-17 PROCEDURE — 85018 HEMOGLOBIN: CPT

## 2023-08-17 PROCEDURE — 71045 X-RAY EXAM CHEST 1 VIEW: CPT

## 2023-08-17 PROCEDURE — 82803 BLOOD GASES ANY COMBINATION: CPT

## 2023-08-17 PROCEDURE — 82435 ASSAY OF BLOOD CHLORIDE: CPT

## 2023-08-17 PROCEDURE — 93451 RIGHT HEART CATH: CPT

## 2023-08-17 PROCEDURE — 86900 BLOOD TYPING SEROLOGIC ABO: CPT

## 2023-08-17 PROCEDURE — 82330 ASSAY OF CALCIUM: CPT

## 2023-08-17 PROCEDURE — C1889: CPT

## 2023-08-17 PROCEDURE — 83880 ASSAY OF NATRIURETIC PEPTIDE: CPT

## 2023-08-17 PROCEDURE — C1894: CPT

## 2023-08-17 PROCEDURE — 85730 THROMBOPLASTIN TIME PARTIAL: CPT

## 2023-08-17 PROCEDURE — 96375 TX/PRO/DX INJ NEW DRUG ADDON: CPT

## 2023-08-17 PROCEDURE — 85014 HEMATOCRIT: CPT

## 2023-08-17 PROCEDURE — 36415 COLL VENOUS BLD VENIPUNCTURE: CPT

## 2023-08-17 PROCEDURE — 94640 AIRWAY INHALATION TREATMENT: CPT

## 2023-08-17 PROCEDURE — 0225U NFCT DS DNA&RNA 21 SARSCOV2: CPT

## 2023-08-17 PROCEDURE — 85027 COMPLETE CBC AUTOMATED: CPT

## 2023-08-17 PROCEDURE — 80053 COMPREHEN METABOLIC PANEL: CPT

## 2023-08-17 PROCEDURE — 84484 ASSAY OF TROPONIN QUANT: CPT

## 2023-08-17 PROCEDURE — 99285 EMERGENCY DEPT VISIT HI MDM: CPT | Mod: 25

## 2023-08-17 PROCEDURE — 86850 RBC ANTIBODY SCREEN: CPT

## 2023-08-17 PROCEDURE — 85610 PROTHROMBIN TIME: CPT

## 2023-08-17 PROCEDURE — 96374 THER/PROPH/DIAG INJ IV PUSH: CPT

## 2023-08-17 PROCEDURE — 84132 ASSAY OF SERUM POTASSIUM: CPT

## 2023-08-17 PROCEDURE — 84295 ASSAY OF SERUM SODIUM: CPT

## 2023-08-17 PROCEDURE — 84100 ASSAY OF PHOSPHORUS: CPT

## 2023-08-17 PROCEDURE — C1769: CPT

## 2023-08-17 PROCEDURE — 84702 CHORIONIC GONADOTROPIN TEST: CPT

## 2023-08-17 PROCEDURE — 99232 SBSQ HOSP IP/OBS MODERATE 35: CPT

## 2023-08-17 PROCEDURE — 80048 BASIC METABOLIC PNL TOTAL CA: CPT

## 2023-08-17 PROCEDURE — 85025 COMPLETE CBC W/AUTO DIFF WBC: CPT

## 2023-08-17 PROCEDURE — 83605 ASSAY OF LACTIC ACID: CPT

## 2023-08-17 RX ORDER — RIVAROXABAN 15 MG-20MG
15 KIT ORAL
Refills: 0 | Status: DISCONTINUED | OUTPATIENT
Start: 2023-08-17 | End: 2023-08-17

## 2023-08-17 RX ORDER — FUROSEMIDE 40 MG
1 TABLET ORAL
Refills: 0 | DISCHARGE

## 2023-08-17 RX ADMIN — Medication 650 MILLIGRAM(S): at 08:30

## 2023-08-17 RX ADMIN — RIVAROXABAN 15 MILLIGRAM(S): KIT at 17:56

## 2023-08-17 RX ADMIN — Medication 40 MILLIGRAM(S): at 05:51

## 2023-08-17 RX ADMIN — Medication 20 MILLIGRAM(S): at 13:35

## 2023-08-17 RX ADMIN — Medication 650 MILLIGRAM(S): at 09:15

## 2023-08-17 RX ADMIN — PANTOPRAZOLE SODIUM 40 MILLIGRAM(S): 20 TABLET, DELAYED RELEASE ORAL at 05:52

## 2023-08-17 RX ADMIN — Medication 500 MICROGRAM(S): at 08:31

## 2023-08-17 RX ADMIN — SPIRONOLACTONE 25 MILLIGRAM(S): 25 TABLET, FILM COATED ORAL at 08:31

## 2023-08-17 RX ADMIN — Medication 20 MILLIGRAM(S): at 05:51

## 2023-08-17 RX ADMIN — Medication 10 MILLIGRAM(S): at 05:51

## 2023-08-17 NOTE — PROGRESS NOTE ADULT - REASON FOR ADMISSION
Shortness of Breath

## 2023-08-17 NOTE — PROGRESS NOTE ADULT - PROBLEM SELECTOR PLAN 3
-DVT: Xarelto  -Diet: dash  -Dispo: pending improvement of respiratory status on diuresis, RHC
-DVT: Xarelto  -Diet: dash  -Dispo: pending improvement of respiratory status on diuresis, RHC
-DVT: Xarelto  -Diet: dash  -Dispo: pending improvement of respiratory status on diuresis
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   - Currently on Xarelto 20 mg daily
-DVT: Xarelto  -Diet: dash  -Dispo: pending improvement of respiratory status on diuresis
-DVT: Xarelto  -Diet: dash  -Dispo: pending improvement of respiratory status on diuresis
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   - Currently on Xarelto 20 mg daily

## 2023-08-17 NOTE — PROGRESS NOTE ADULT - PROBLEM SELECTOR PLAN 2
-Continue home O2  -Atrovent PRN
- Continue home O2  - Atrovent PRN
Pulmonary HTN group I and IV)  - on continuous remodulin at 46 nanograms/kg/min;  - remains on home O2 requirment @2L/min NC  - c/w sildenafil 20 mg TID  - follow up with Dr. Yoon.
Pulmonary HTN group I and IV)  - on continuous remodulin at 46 nanograms/kg/min;  - remains on home O2 requirment @2L/min NC  - c/w sildenafil 20 mg TID  - follow up with Dr. Yoon.
- Continue home O2  - Atrovent PRN

## 2023-08-17 NOTE — PROGRESS NOTE ADULT - PROBLEM SELECTOR PLAN 1
- In s/o pulmonary HTN  - On remodulin, slidenafil, xarelto at home  - Lasix 40mg qid, spironolactone 25 mg qd at home  - On 2L O2 at home  - Patient likely volume-overloaded in s/o pulmonary HTN  - Chest x-ray with pulmonary arterial congestion  - EKG w/ NSR and RVH  - S/p diuresis with 40 mg IV lasix qd, per HF  - C/w home remodulin, slidenafil, xarelto, spironolactone, prednisone  - TTE (8/14) -> EF 55-60%, RV overload  - Pulmonology and HF consulted, will continue to appreciate recs  - Strict I&O, daily weights  - RHC (8/16) -> Elevated RV pressure c/w pulmonary HTN, elevated PCWP  - Switching home diuretic to PO torsemide 20mg qd, per HF

## 2023-08-17 NOTE — DISCHARGE NOTE NURSING/CASE MANAGEMENT/SOCIAL WORK - NSDCPEPTCAREGIVEDUMATLIST _GEN_ALL_CORE
Heart Failure bed mobility training/strengthening/ROM/transfer training/gait training/Stair Negotiation

## 2023-08-17 NOTE — DISCHARGE NOTE NURSING/CASE MANAGEMENT/SOCIAL WORK - NSDCFUADDAPPT_GEN_ALL_CORE_FT
APPTS ARE READY TO BE MADE: [x] YES    Best Family or Patient Contact (if needed):    Additional Information about above appointments (if needed):    1: Please follow-up with your pulmonologist/PCP in 1 week    Other comments or requests:

## 2023-08-17 NOTE — PROGRESS NOTE ADULT - PROVIDER SPECIALTY LIST ADULT
Internal Medicine
Pulmonology
Heart Failure
Internal Medicine
Heart Failure
Internal Medicine

## 2023-08-17 NOTE — PROGRESS NOTE ADULT - PROBLEM SELECTOR PROBLEM 3
Right atrial thrombus
Prophylactic measure
Right atrial thrombus
Prophylactic measure
Prophylactic measure

## 2023-08-17 NOTE — PROGRESS NOTE ADULT - ASSESSMENT
42 y/o female with medical hx including pulmonary HTN c/b right heart failure, multiple abdominal wall abscesses, right atrial thrombus s/p thrombectomy, asthma, anemia presenting with 1 day of shortness of breath and palpitations in the setting of right-sided heart failure and pulmonary hypertension.      Cardiac Studies  6/8 TTE LVIDd 3.4cm, LVEF 64%, interdeterminate DD, mod RVE, TAPSE 2.1cm, Trace MR, trace TR, mld-mod MT  8/2022 TTE revealed RA thrombus with concern for clot in transit. She underwent catheter directed partial  thrombus aspiration 8/31  Limited TTE 8/31 @10:30: LVIDd 3.2 cm, hyperdynamic LV, new echogenic mass in RA 1.8 cm x 3.1 cm likely representing clot in transit (not seen on TTE from 8/29), RVE with decreased RV systolic function, est PASP 60 mmHg  9/1/22 Cath for venous thrombectomy right arm   RHC 7/23/20 RA 4, RV 80/4, PA 80/40/57, PCWP 22, LVEDP 4; CO/CI 4/2.0; PVR 11 (using EDP 13).

## 2023-08-17 NOTE — PROGRESS NOTE ADULT - SUBJECTIVE AND OBJECTIVE BOX
PROGRESS NOTE:     Patient is a 43y old  Female who presents with a chief complaint of Shortness of Breath (17 Aug 2023 11:46)      SUBJECTIVE / OVERNIGHT EVENTS:    OVERNIGHT: No acute overnight events.      Patient was examined at bedside and feels well. She endorses some soreness following her RHC yesterday but otherwise has no acute complaints at this time. ROS otherwise negative and pt is amenable to discharge.      REVIEW OF SYSTEMS:    CONSTITUTIONAL:  No weakness, fevers or chills  EYES/ENT:  No visual changes;  No vertigo or throat pain   NECK:  No pain or stiffness  RESPIRATORY:  No cough, wheezing, hemoptysis; No shortness of breath  CARDIOVASCULAR:  No chest pain or palpitations  GASTROINTESTINAL:  No abdominal or epigastric pain. No nausea, vomiting, or hematemesis; No diarrhea or constipation. No melena or hematochezia.  GENITOURINARY:  No dysuria, frequency or hematuria  NEUROLOGICAL:  No numbness or weakness  SKIN:  No itching, rashes      MEDICATIONS  (STANDING):  furosemide    Tablet 40 milliGRAM(s) Oral daily  pantoprazole    Tablet 40 milliGRAM(s) Oral before breakfast  predniSONE   Tablet 10 milliGRAM(s) Oral daily  Remodulin (Treprostinil) SubQ Infusion 1 Dose(s) 1 Dose(s) SubCutaneous Continuous Pump  rivaroxaban 15 milliGRAM(s) Oral two times a day with meals  sildenafil (REVATIO) 20 milliGRAM(s) Oral every 8 hours  spironolactone 25 milliGRAM(s) Oral daily    MEDICATIONS  (PRN):  acetaminophen     Tablet .. 650 milliGRAM(s) Oral every 6 hours PRN Moderate Pain (4 - 6)  ipratropium    for Nebulization 500 MICROGram(s) Nebulizer every 6 hours PRN Asthma  sodium chloride 0.65% Nasal 1 Spray(s) Both Nostrils every 2 hours PRN Nasal Congestion      CAPILLARY BLOOD GLUCOSE        I&O's Summary      PHYSICAL EXAM:  Vital Signs Last 24 Hrs  T(C): 36.3 (17 Aug 2023 12:10), Max: 36.8 (16 Aug 2023 18:53)  T(F): 97.4 (17 Aug 2023 12:10), Max: 98.3 (16 Aug 2023 18:53)  HR: 106 (17 Aug 2023 12:10) (83 - 119)  BP: 110/75 (17 Aug 2023 12:10) (94/68 - 120/87)  BP(mean): --  RR: 18 (17 Aug 2023 12:10) (16 - 18)  SpO2: 95% (17 Aug 2023 12:10) (95% - 98%)    Parameters below as of 17 Aug 2023 12:10  Patient On (Oxygen Delivery Method): nasal cannula  O2 Flow (L/min): 2      CONSTITUTIONAL: NAD; well-developed  HEENT: PERRL, clear conjunctiva  RESPIRATORY: Normal respiratory effort; lungs are clear to auscultation bilaterally; No Crackles/Rhonchi/Wheezing  CARDIOVASCULAR: Regular rate and rhythm, normal S1 and S2, no murmur/rub/gallop; No lower extremity edema; Peripheral pulses are 2+ bilaterally  ABDOMEN: Nontender to palpation, normoactive bowel sounds, no rebound/guarding; No hepatosplenomegaly  MUSCULOSKELETAL: no clubbing or cyanosis of digits; no joint swelling or tenderness to palpation  EXTREMITY: Lower extremities Non-tender to palpation; non-erythematous B/L  NEURO: A&Ox3; no focal deficits   PSYCH: normal mood; Affect appropriate    LABS:                        13.8   9.95  )-----------( 415      ( 17 Aug 2023 11:51 )             45.9     08-17    135  |  99  |  17  ----------------------------<  116<H>  4.3   |  21<L>  |  1.14    Ca    8.8      17 Aug 2023 11:52  Phos  2.5     08-17  Mg     1.9     08-17      PT/INR - ( 16 Aug 2023 06:49 )   PT: 14.7 sec;   INR: 1.35 ratio         PTT - ( 16 Aug 2023 06:49 )  PTT:43.3 sec      Urinalysis Basic - ( 17 Aug 2023 11:52 )    Color: x / Appearance: x / SG: x / pH: x  Gluc: 116 mg/dL / Ketone: x  / Bili: x / Urobili: x   Blood: x / Protein: x / Nitrite: x   Leuk Esterase: x / RBC: x / WBC x   Sq Epi: x / Non Sq Epi: x / Bacteria: x          RADIOLOGY & ADDITIONAL TESTS:

## 2023-08-17 NOTE — PROGRESS NOTE ADULT - PROBLEM SELECTOR PROBLEM 1
Right heart failure

## 2023-08-17 NOTE — DISCHARGE NOTE NURSING/CASE MANAGEMENT/SOCIAL WORK - PATIENT PORTAL LINK FT
You can access the FollowMyHealth Patient Portal offered by [facility] by registering at the following website: [email or web address]. By joining [employer]’s FollowMyHealth portal, you will also be able to view your health information using other applications (apps) compatible with our system.

## 2023-08-17 NOTE — PROVIDER CONTACT NOTE (OTHER) - ASSESSMENT
Pt A&Ox4. Pt states that her chest feels sore form the cath and that PO tylenol with not help with the 7/10 pain.

## 2023-08-17 NOTE — PROGRESS NOTE ADULT - SUBJECTIVE AND OBJECTIVE BOX
ADVANCED HEART FAILURE & TRANSPLANT  - PROGRESS NOTE  *To reach the NS1 Team from 8am to 5pm, please call [phone].  NS2 [phone].      Ted Cooper MD   Internal Medicine, PGY 2  Contact via TEAMS.   _______________________________________________________________________________________________________    Subjective: NAEON. Pt endorses improved respiratory status, though is still slightly SOB on exertion. Reports mild discomfort after procedure. Denies fever, chills, abdominal pain, N/V, dysuria, hematuria, HA or weakness.       Medications:  acetaminophen     Tablet .. 650 milliGRAM(s) Oral every 6 hours PRN  furosemide    Tablet 40 milliGRAM(s) Oral daily  ipratropium    for Nebulization 500 MICROGram(s) Nebulizer every 6 hours PRN  pantoprazole    Tablet 40 milliGRAM(s) Oral before breakfast  predniSONE   Tablet 10 milliGRAM(s) Oral daily  Remodulin (Treprostinil) SubQ Infusion 1 Dose(s) 1 Dose(s) SubCutaneous Continuous Pump  rivaroxaban 15 milliGRAM(s) Oral two times a day with meals  sildenafil (REVATIO) 20 milliGRAM(s) Oral every 8 hours  sodium chloride 0.65% Nasal 1 Spray(s) Both Nostrils every 2 hours PRN  spironolactone 25 milliGRAM(s) Oral daily      ICU Vital Signs Last 24 Hrs  T(C): 36.6, Max: 36.8 (08-16 @ 18:53)  HR: 109 (83 - 119)  BP: 117/79 (94/68 - 120/87)  BP(mean): --  ABP: --  ABP(mean): --  RR: 18 (16 - 18)  SpO2: 97% (95% - 98%)    Weight in k (23)  Weight in k.4 (23)      I&O's Summary Last 24 Hrs    Tele:    Physical Exam:    General: No distress. Comfortable.  HEENT: EOM intact.  Neck: JVP not elevated.  Respiratory: Clear to auscultation bilaterally  CV: RRR. Normal S1 and S2. No murmurs, rub, or gallops. Radial pulses normal.  Abdomen: Soft, non-distended, non-tender  Skin: No skin lesions  Extremities: Warm, no edema  Neurology: Non-focal, alert and oriented times three.   Psych: Affect normal    Labs:    ( 23 @ 06:48 )               13.9   11.14 )--------( 400                  45.3     ( 23 @ 06:50 )     136  |  102  |  16  ---------------------<  85  3.8  |  20  |  1.00    Ca 8.8  Phos 3.9  Mg 2.1      PTT/PT/INR - ( 23 @ 06:49 )  PTT: 43.3 sec / PT: 14.7 sec / INR: 1.35 ratio    ( 23 @ 10:04 )  TropHS 7     / CK x     / CKMB x                   ADVANCED HEART FAILURE & TRANSPLANT  - PROGRESS NOTE  *To reach the NS1 Team from 8am to 5pm, please call [phone].  NS2 [phone].      Ted Cooper MD   Internal Medicine, PGY 2  Contact via TEAMS.   _______________________________________________________________________________________________________    Subjective: NAEON. Pt endorses improved respiratory status, though is still slightly SOB on exertion. Reports mild discomfort after procedure. Denies fever, chills, abdominal pain, N/V, dysuria, hematuria, HA or weakness.       Medications:  acetaminophen     Tablet .. 650 milliGRAM(s) Oral every 6 hours PRN  furosemide    Tablet 40 milliGRAM(s) Oral daily  ipratropium    for Nebulization 500 MICROGram(s) Nebulizer every 6 hours PRN  pantoprazole    Tablet 40 milliGRAM(s) Oral before breakfast  predniSONE   Tablet 10 milliGRAM(s) Oral daily  Remodulin (Treprostinil) SubQ Infusion 1 Dose(s) 1 Dose(s) SubCutaneous Continuous Pump  rivaroxaban 15 milliGRAM(s) Oral two times a day with meals  sildenafil (REVATIO) 20 milliGRAM(s) Oral every 8 hours  sodium chloride 0.65% Nasal 1 Spray(s) Both Nostrils every 2 hours PRN  spironolactone 25 milliGRAM(s) Oral daily      ICU Vital Signs Last 24 Hrs  T(C): 36.6, Max: 36.8 (08-16 @ 18:53)  HR: 109 (83 - 119)  BP: 117/79 (94/68 - 120/87)  RR: 18 (16 - 18)  SpO2: 97% (95% - 98%)    Weight in k (23)  Weight in k.4 (23)      I&O's Summary Last 24 Hrs    Tele:    Physical Exam:    General: No distress. Comfortable.  HEENT: EOM intact.  Neck: JVP not elevated.  Respiratory: Clear to auscultation bilaterally  CV: RRR. Normal S1 and S2. No murmurs, rub, or gallops. Radial pulses normal.  Abdomen: Soft, non-distended, non-tender  Skin: No skin lesions  Extremities: Warm, no edema  Neurology: Non-focal, alert and oriented times three.   Psych: Affect normal    Labs:    ( 23 @ 06:48 )               13.9   11.14 )--------( 400                  45.3     ( 23 @ 06:50 )     136  |  102  |  16  ---------------------<  85  3.8  |  20  |  1.00    Ca 8.8  Phos 3.9  Mg 2.1      PTT/PT/INR - ( 23 @ 06:49 )  PTT: 43.3 sec / PT: 14.7 sec / INR: 1.35 ratio    ( 23 @ 10:04 )  TropHS 7     / CK x     / CKMB x

## 2023-08-17 NOTE — PROGRESS NOTE ADULT - ATTENDING COMMENTS
1. Pulmonary HTN admitted with acute on chronic R heart failure and le edema. Pt has been diuresed and is now at baseline.      Continue Lasix 40mg daily.     Continue Treprostinil SQ     Continue sildenafil       2. chronic hypoxemic respiratory failure; Continue 2 liters nasal02.    3. RA thrombus: continue Xarelto.    4.Asthma: continue Prednisone 10 mg and Atrovent.
-CHF recs appreciated; diuretic regimen changed to torsemide 20mg daily. -Otherwise stable for DC with outpatient CHF and pulm follow up. -35 minutes spent on the DC process.
-F/u RHC results and CHF/pulm recs thereafter.
43/F with Group 1 PAH on PAH therapy, RV dysfunction, obesity with recurrent hospitalization for SOB, admitted saturday for worsening SOB. symptoms improved after 1-2 doses of IV lasix.   TTE from 8/14 with collapsed IVC, RV pressure overload, RV dysfunction, TAPSE 1.2, normal LV function.   No RHC since 2020.     s/p RHC 8/16: RA 6, PA: 81/43(57), PCWP: 36, PA sat 64%, Xenia CO/CI: 4.36/2.1  Euvolemic on exam  switch to torsemide 20mg po daily  resume xeralto   plan d/w  in detail with PHTN team Dr. Yoon  Pt to monitor wt and BP at home  follow up with Dr. Yoon and Dr. Palmira jorge to d/c from HF perspective
-Patient reports feeling better s/p IV lasix, now converted to PO. -Echo completed today, pending read. -F/u pulm and CHF recs once echo read for further mgmt. -Pending further recs will guide rest of inpatient plan. -Replete yani prn.   -Discussed plan with patient and her  at bedside.
43/F with Group 1 PAH on PAH therapy, RV dysfunction, obesity with recurrent hospitalization for SOB, admitted saturday for worsening SOB. symptoms improved after 1-2 doses of IV lasix.   TTE from 8/14 with collapsed IVC, RV pressure overload, RV dysfunction, TAPSE 1.2, normal LV function.   No RHC since 2020.     Euvolemic on exam  cont po lasix  Plan for RHC tomorrow to assess PA pressures and CO/CI for further evaluate RV dysfunction  hold Xeralto   NPO after MN  will adjust meds after RHC tomorrow  plan d/w patient and PHTN team Dr. Yoon
-C/w lasix 40mg po daily for now pending further CHF recs. -F/u chf/pulm recs regarding echo findings.  -Hot packs for right arm cramps for now.
42 y/o female w/ hx of pulmonary HTN c/b right heart failure, multiple abdominal wall abscesses, right atrial thrombus s/p thrombectomy, asthma, anemia admitted for right heart failure exacerbation in s/o pulmonary HTN.    #Acute on chronic HFpEF; right HF secondary to pulm HTN: EF = 64% on 06/2023;   - Diuretic: Lasix 40mg IV daily - transition to PO Lasix tomorrow   - MRA: Spironolactone 25mg daily   Pulm and HF following- c/w home oxygen, Remodulin, prednisone 10mg daily, Sildenafil 20mg Q8H   - F/u repeat TTE     #History right atrial thrombus- c/w Xarelto     #Asthma- c/w Atrovent     DVT Ppx: Xarelto  GI Ppx: Protonix    Discussed with resident.

## 2023-08-17 NOTE — PROGRESS NOTE ADULT - PROBLEM SELECTOR PLAN 1
Right heart failure with normal LVEF  - transition to torsemide 20 mg qD   - Continue spironolactone 25 mg daily  - repeat TTE w/ RV overload, normal LVSF   - Maintain Strict I/O's   - Daily standing weights  - Replete electrolytes to K>4.0 and Mg >2.5  - RHC w/ elevated RV, PA pressures consistent w/ pHTN   - elevated PCWP w/o clear etiology given normal LVSF; possibly i/s/o HTN causing increased afterload   - recommend pt to check BP at home, if elevated can consider starting ARB

## 2023-08-17 NOTE — DISCHARGE NOTE NURSING/CASE MANAGEMENT/SOCIAL WORK - NSDCPEFALRISK_GEN_ALL_CORE
For information on Fall & Injury Prevention, visit: [email or web address] OR  [email or web address] OR  https://www.cdc.gov/steadi/patient.html

## 2023-08-18 ENCOUNTER — NON-APPOINTMENT (OUTPATIENT)
Age: 43
End: 2023-08-18

## 2023-08-21 ENCOUNTER — APPOINTMENT (OUTPATIENT)
Dept: PULMONOLOGY | Facility: CLINIC | Age: 43
End: 2023-08-21

## 2023-08-28 ENCOUNTER — APPOINTMENT (OUTPATIENT)
Dept: PULMONOLOGY | Facility: CLINIC | Age: 43
End: 2023-08-28
Payer: MEDICAID

## 2023-08-28 ENCOUNTER — LABORATORY RESULT (OUTPATIENT)
Age: 43
End: 2023-08-28

## 2023-08-28 VITALS
HEART RATE: 125 BPM | DIASTOLIC BLOOD PRESSURE: 63 MMHG | OXYGEN SATURATION: 94 % | HEIGHT: 65 IN | SYSTOLIC BLOOD PRESSURE: 99 MMHG | TEMPERATURE: 98.2 F | BODY MASS INDEX: 37.05 KG/M2 | WEIGHT: 222.38 LBS

## 2023-08-28 DIAGNOSIS — K52.9 NONINFECTIVE GASTROENTERITIS AND COLITIS, UNSPECIFIED: ICD-10-CM

## 2023-08-28 PROCEDURE — 99215 OFFICE O/P EST HI 40 MIN: CPT | Mod: 25

## 2023-08-28 RX ORDER — PREDNISONE 2.5 MG/1
2.5 TABLET ORAL
Qty: 90 | Refills: 3 | Status: ACTIVE | COMMUNITY
Start: 2023-08-28 | End: 1900-01-01

## 2023-08-28 RX ORDER — METRONIDAZOLE 500 MG/1
500 TABLET ORAL TWICE DAILY
Qty: 14 | Refills: 0 | Status: ACTIVE | COMMUNITY
Start: 2023-08-28 | End: 1900-01-01

## 2023-08-28 RX ORDER — DIPHENOXYLATE HYDROCHLORIDE AND ATROPINE SULFATE 2.5; .025 MG/1; MG/1
2.5-0.025 TABLET ORAL TWICE DAILY
Qty: 60 | Refills: 0 | Status: ACTIVE | COMMUNITY
Start: 2023-08-28 | End: 1900-01-01

## 2023-08-29 LAB
ALBUMIN SERPL ELPH-MCNC: 4.4 G/DL
ALP BLD-CCNC: 65 U/L
ALT SERPL-CCNC: 12 U/L
ANION GAP SERPL CALC-SCNC: 14 MMOL/L
AST SERPL-CCNC: 14 U/L
BILIRUB SERPL-MCNC: 0.6 MG/DL
BUN SERPL-MCNC: 9 MG/DL
CALCIUM SERPL-MCNC: 8.8 MG/DL
CHLORIDE SERPL-SCNC: 107 MMOL/L
CO2 SERPL-SCNC: 20 MMOL/L
CREAT SERPL-MCNC: 0.95 MG/DL
EGFR: 76 ML/MIN/1.73M2
GLUCOSE SERPL-MCNC: 89 MG/DL
NT-PROBNP SERPL-MCNC: 494 PG/ML
POTASSIUM SERPL-SCNC: 3.9 MMOL/L
PROT SERPL-MCNC: 7 G/DL
SODIUM SERPL-SCNC: 141 MMOL/L

## 2023-08-30 NOTE — DISCUSSION/SUMMARY
[FreeTextEntry1] : ----Assessment and Plan--------42 year-old lady with PULMONARY ARTERIAL HYPERTENSION S/P PPM FOR TACHYCARDIA AT [place] ON REMODULIN - S/P LEFT BREAST BIOPSY   doing well on remodulin 38 ng ----- ON XARELTO, LASIX AND ALDACTONE -------LOW DOSE PREDNISONE -----is following up with at [facility] for transplant listing-----final decision after the  and psych evaluation  JAN 2023---- ABDOMINAL WALL ABSCESS-----needED surgical drainage-doneE MARCH 2023   --- pain abdomen rule out pancreatitis AUGUST 2023--- admitted with fluid overload was treated with diuretics  1. PULMONARY ART HTN   ---- WHO GROUP I FUNCTIONAL CLASS III ----  [AS PER DR FREEMAN WHO DID PULM ANGIO SHE HAS FEATURES OF GROUP I WITH SOME   EVIDENCE OF DISTAL CLOTS]   She is on Remodulin 48 NG/KG/MIN  ---Increase to  50NG  Continue  Sildenafil 20mg qd, Xarelto, ON torsemide  AND ALDACTONE 25 MG - f/u with heart failure team  2. Asthma -. use  ipratropium and levalbuterol and budesonide. Refuses use of DUPIXENT at this time as she reports her allergies are "not that bad". Has has been previously  steroid dependent likely contributing to her weight. She is hesitant to start biologic injections.  Has discontinued Flonase and Dymysta nasal spray    3. High BMI (41.9 kg/m2)   Precluding lung transplant   Referred to nutritionist------ extensive nutritional advice given--- she promises to follow-up on thAT   Weight loss program advised  4. Oxygen medically necessary given severe Pulm Htn, anemia and h/o nocturnal desaturation which could worsen Pulm HTN. Advised oxygen 2 lpm x 24hrs  5. She is on Xarelto---will be on lifelong anticoagulation---  6) She has features of ERICA- including sleep disturbance, nocturnal desat, and excessive fatigue- HST was negative for ERICA in 2014 but had desaturation. Will try to repeat HST at next visit  8. Follows /[facility]----  ---Also consulted with Dr Lou at [facility]---   As per patient, Dr Dasha feels she is not a candidate at this time because of good health   Although Dr. Lou advised needs to lose wt to be considered for transplant listing in future  9.chronic diarrhea- lomotil and flagyl 10. labs drawn in our office today 11. f/u in 10.2023  Vivian Yoon MD, [facility]

## 2023-08-30 NOTE — HISTORY OF PRESENT ILLNESS
[TextBox_4] : --43  female----------Patient is here for follow up of her pulmonary htn. ------------------INITIALLY  REFD  WITH   ECHO [DONE  OUTSIDE ] ELEVATED PASP  DILATED RV AND RA---------  HAS BEEN TOLD IN THE PAST SHE HAS ??? ASTHMA-----long-standing history of dyspnea on exertion-----------------has baseline tachycardia ----...--s. No history of liver dysfunction , collagen vascular disorder or chronic thromboembolic disease. I would classify her dyspnea as WHO  FUNCTIONAL CLASS III-----------no calf tenderness----------SLEEP STUDY SHOWS AHI 0.6 but T 90  < 35 %-----DIAGNOSED  AS PAH ---------WAS ON ADMAPAS  NOW ON   SQ REMODULIN  [ SINCE MAY 2018]  ---S/P PACEMAKER PLACEMENT AT [place] [facility]  AND ON METOPROLOL-----   2/3/2022 tested positive on home covid test 2/2/2022- developed shortness of breath, nausea and vomitting- son is positive She is vaccinated for covid but not boosted Afebrile, drinking but not eating well. O2 sat 94% room air rest- 92% room air w/walk  2/7/2022 covid pcr negative - did not get MAB still with SOUZA - on prednisone with taper  4/2022 6mwt o2 sat 95% to 94% on oxygen 69meters (135m) stopped d/t severe SOB and elevated HR  4/15/2022  pulm htn - on remodulin 36 ng (having jaw pain sometimes), sildenafil 10 mg qd, furosemide 20 mg 5x weekly which helped w/edema. She is also having palpitations- has not yet followed up with DR Chavis in cardiology. Asthma-occas wheezing- awaiting start of dupixent Up to date w/covid vac- declines influenza vac. s/p consult with lung transplant team but needs to lower BMI before being considered- pt has been referred to nutritionist but has not scheduled appt   5/31/22- pulm htn - on remodulin 36 ng SQ (having jaw pain sometimes), sildenafil 10 mg qd, and diuretics daily- doing well from pulm perspectives Asthma currently under control- declining biologic therapy Was following lung  transplant team but currently on hold d/t BMI   7/11/22- pulm htn--on remodulin 36 ng SQ (having jaw pain sometimes), sildenafil 10 mg qd and diuretics daily [LASIX WO MG---FAZAL  was in the hospital at [facility]-- given Lasix --- we will slowly increase her dose of Remodulin  7/22/2022 pulm htn- breathing improved on higher dose of remodulin 40ng, having some diarrhea. Palpitations improved  9/15 /2022 ----s/p hospitalization for cardiogenic shock due to RV failure with markers of end organ dysfunction, started on  as well as Marcela. CTA negative for PE but TTE revealed RA thrombus with concern for clot in transit. She underwent catheter directed partial thrombus aspiration 8/31, course complicated by L fem pseudoaneurysm for which she received thrombin injection on 9/3. Course further complicated by S. Lugdunensis bacteremia on BCx from 8/29 s/p drainage of abscess at prior----on IV antibiotics as per ID Remodulin subcutaneous access site.--42 ng/kg/min , also on sildenafil 20 mg 3 times daily ----   SEPT 27 2022-----feels much better on subcu Remodulin 44 ng/kg/min and sildenafil 20 mg 3 times daily on diuretics-on anticoagulation-------- needs to see vascular for left femoral pseudoaneurysm--- H/O---S.Lugdunensis bacteremia on BCx from 8/29 s/p drainage of abscess at prior----on IV antibiotics as per ID --------ALSO HAS Lleft   fem pseudoaneurysm---needs follow up by  vasular ---- --received flu vaccine today------------------------  October 2022: Telehealth Visit via teledoc S/P hospitalization for cardiogenic shock c/b bacteremia on IV antibiotics. Followed by ID (Dr. Grover) for labs, antibiotics now d/c'd (10/6)  getting follow up labs w/ID (results pending from 10/19) Pulm htn -She reports use of Remodulin 44 ng/kg/min, tolerating new dose well  and Sildenafil (20 mg t.i.d PO) No resp complaints, no palpitations, follows cardiology Dr Paramjit Chavis. On diuretics, xarelto, O2 as needed s/p lung transplant eval- was told she was "too well" to be listed and needs to work on getting her BMI down  jan 2022--developed abdominal wall abscess--needs surgical drainage--------subcu Remodulin 44 ng/kg/min and sildenafil 20 mg 3 times daily on diuretics-on anticoagulation--  mario    10 2023--- recent admission to the hospital for abscess on abdominal wall which was drained and received antibiotics and it has healed----------------subcu Remodulin 44 ng/kg/min and sildenafil 20 mg 3 times daily on diuretics-on anticoagulation--I would like to increase Remodulin to 45 ng/kg/min--------- having some URI symptoms we will send a nasal swab continue diuretics----   3/2023---seems stable  pulm htn -remains on remodulin 44ng and sildenafil, diuretics per heart failure Dr Chavis c/o worsening SOUZA and nausea x 1week, chroic diarrhea which she attributes to remodulin, and lower abd pain ///??? pancreatitis Tacchycardia has improved, on continuous oxygen Accompanied by    5/2023 accompanied by  pulm htn -remains on remodulin 44ng and sildenafil, diuretics per heart failure Dr Chavis old remodulin site healing w/minimal drainage (previously treated w/antibiotics)  c/o nausea, lightheadedness, headache  june 2023--------accompanied by  pulm htn -remains on remodulin 46ng and sildenafil, diuretics per heart failure Dr Chavis -feesl btter--- less  nausea,---will increase   Remodulin to to 48 ng/kg/min  8/2023 43-year-old female w/ PMH of pulmonary HTN c/b right heart failure, multiple abdominal wall abscesses, right atrial thrombus s/p thrombectomy, asthma, anemia B WAS  admitted for right heart failure exacerbation in s/o pulmonary HTN. IN AUGUST 2023----  -  Problem: Right heart failure.-----  Plan: - In s/o pulmonary HTN - On remodulin, slidenafil, xarelto at home - Lasix 40mg qid, spironolactone 25 mg qd at home - On 2L O2 at home - - S/p diuresis with 40 mg IV lasix qd, per HF -- TTE (8/14) -> EF 55-60%, RV overload -- RHC (8/16) -> Elevated RV pressure c/w pulmonary HTN, elevated PCWP  [ BELIEVE THIS WEDGE PRESSURE IS ERRONEOUS AS PT HAS PREVIOSU EVDIECNE OF PRECAPILLARY PULMONARY HTN ] - - - - -  -At discharge patient was switched to  PO torsemide 20mg qd, per HF -------, using oxygen.   ------Pulm htn On remodulin 48ng- c/o diarrhea remains on sildenafil 20mg tid  [TextBox_100] : 1/2014 [TextBox_108] : 0.9 [TextBox_112] : 35.3

## 2023-08-30 NOTE — END OF VISIT
[FreeTextEntry3] :   I, Dr. Vivian Yoon  personally performed the evaluation and management (E/M) services for this established patient who presents today with (a) new problem(s)/exacerbation of (an) existing condition(s). That E/M includes conducting the clinically appropriate interval history &/or exam, assessing all new/exacerbated conditions, and establishing a new plan of care. Today, my NAYELI, Aminah Monroy NP, , was here to observe my evaluation and management service for this new problem/exacerbated condition and follow the plan of care established by me going forward. [Time Spent: ___ minutes] : I have spent [unfilled] minutes of time on the encounter.

## 2023-08-30 NOTE — REVIEW OF SYSTEMS
[Fever] : no fever [Chills] : no chills [Dry Eyes] : no dry eyes [Cough] : no cough [Hemoptysis] : no hemoptysis [Sputum] : no sputum [Chest Discomfort] : no chest discomfort [Nasal Discharge] : no nasal discharge [Abdominal Pain] : no abdominal pain [Nausea] : no nausea [Vomiting] : no vomiting [Nocturia] : no nocturia [Arthralgias] : no arthralgias [Raynaud] : no raynaud [Myalgias] : no myalgias [Headache] : no headache [Focal Weakness] : no focal weakness [Numbness] : no numbness [Depression] : no depression [Anxiety] : no anxiety [Diabetes] : no diabetes

## 2023-08-31 ENCOUNTER — NON-APPOINTMENT (OUTPATIENT)
Age: 43
End: 2023-08-31

## 2023-09-21 DIAGNOSIS — R09.81 NASAL CONGESTION: ICD-10-CM

## 2023-09-21 RX ORDER — PREDNISONE 10 MG/1
10 TABLET ORAL DAILY
Qty: 14 | Refills: 1 | Status: ACTIVE | COMMUNITY
Start: 2023-09-21 | End: 1900-01-01

## 2023-09-21 RX ORDER — GUAIFENESIN 600 MG/1
600 TABLET, EXTENDED RELEASE ORAL
Qty: 60 | Refills: 2 | Status: ACTIVE | COMMUNITY
Start: 2023-09-21 | End: 1900-01-01

## 2023-09-26 ENCOUNTER — APPOINTMENT (OUTPATIENT)
Dept: ELECTROPHYSIOLOGY | Facility: CLINIC | Age: 43
End: 2023-09-26
Payer: MEDICAID

## 2023-09-26 ENCOUNTER — NON-APPOINTMENT (OUTPATIENT)
Age: 43
End: 2023-09-26

## 2023-09-26 PROCEDURE — 93296 REM INTERROG EVL PM/IDS: CPT

## 2023-09-26 PROCEDURE — 93294 REM INTERROG EVL PM/LDLS PM: CPT

## 2023-10-02 RX ORDER — DOXYCYCLINE HYCLATE 100 MG/1
100 TABLET ORAL
Qty: 14 | Refills: 0 | Status: ACTIVE | COMMUNITY
Start: 2023-10-02 | End: 1900-01-01

## 2023-10-12 NOTE — REASON FOR VISIT
[Follow-Up] : a follow-up visit [TextBox_44] : pre lung transplant  [TextBox_13] : Dr Chavis What Type Of Note Output Would You Prefer (Optional)?: Standard Output Hpi Title: Evaluation of Skin Lesions

## 2023-10-13 ENCOUNTER — EMERGENCY (EMERGENCY)
Facility: HOSPITAL | Age: 43
LOS: 1 days | Discharge: HOME | End: 2023-10-13
Attending: EMERGENCY MEDICINE
Payer: MEDICAID

## 2023-10-13 VITALS
TEMPERATURE: 98 F | RESPIRATION RATE: 20 BRPM | OXYGEN SATURATION: 98 % | HEART RATE: 91 BPM | DIASTOLIC BLOOD PRESSURE: 83 MMHG | SYSTOLIC BLOOD PRESSURE: 121 MMHG

## 2023-10-13 DIAGNOSIS — Z98.51 TUBAL LIGATION STATUS: Chronic | ICD-10-CM

## 2023-10-13 DIAGNOSIS — Z95.0 PRESENCE OF CARDIAC PACEMAKER: Chronic | ICD-10-CM

## 2023-10-13 LAB
ALBUMIN SERPL ELPH-MCNC: 3.7 G/DL — SIGNIFICANT CHANGE UP (ref 3.3–5)
ALP SERPL-CCNC: 59 U/L — SIGNIFICANT CHANGE UP (ref 40–120)
ALT FLD-CCNC: 8 U/L — LOW (ref 10–45)
ANION GAP SERPL CALC-SCNC: 11 MMOL/L — SIGNIFICANT CHANGE UP (ref 5–17)
ANISOCYTOSIS BLD QL: SIGNIFICANT CHANGE UP
AST SERPL-CCNC: 11 U/L — SIGNIFICANT CHANGE UP (ref 10–40)
BASE EXCESS BLDV CALC-SCNC: -0.6 MMOL/L — SIGNIFICANT CHANGE UP (ref -2–3)
BASOPHILS # BLD AUTO: 0 K/UL — SIGNIFICANT CHANGE UP (ref 0–0.2)
BASOPHILS NFR BLD AUTO: 0 % — SIGNIFICANT CHANGE UP (ref 0–2)
BILIRUB SERPL-MCNC: 0.4 MG/DL — SIGNIFICANT CHANGE UP (ref 0.2–1.2)
BUN SERPL-MCNC: 9 MG/DL — SIGNIFICANT CHANGE UP (ref 7–23)
BURR CELLS BLD QL SMEAR: PRESENT — SIGNIFICANT CHANGE UP
CALCIUM SERPL-MCNC: 8.5 MG/DL — SIGNIFICANT CHANGE UP (ref 8.4–10.5)
CHLORIDE SERPL-SCNC: 106 MMOL/L — SIGNIFICANT CHANGE UP (ref 96–108)
CO2 BLDV-SCNC: 27 MMOL/L — HIGH (ref 22–26)
CO2 SERPL-SCNC: 22 MMOL/L — SIGNIFICANT CHANGE UP (ref 22–31)
CREAT SERPL-MCNC: 0.91 MG/DL — SIGNIFICANT CHANGE UP (ref 0.5–1.3)
EGFR: 80 ML/MIN/1.73M2 — SIGNIFICANT CHANGE UP
ELLIPTOCYTES BLD QL SMEAR: SLIGHT — SIGNIFICANT CHANGE UP
EOSINOPHIL # BLD AUTO: 0.17 K/UL — SIGNIFICANT CHANGE UP (ref 0–0.5)
EOSINOPHIL NFR BLD AUTO: 1.8 % — SIGNIFICANT CHANGE UP (ref 0–6)
GAS PNL BLDV: SIGNIFICANT CHANGE UP
GLUCOSE SERPL-MCNC: 81 MG/DL — SIGNIFICANT CHANGE UP (ref 70–99)
HCO3 BLDV-SCNC: 25 MMOL/L — SIGNIFICANT CHANGE UP (ref 22–29)
HCT VFR BLD CALC: 40.6 % — SIGNIFICANT CHANGE UP (ref 34.5–45)
HGB BLD-MCNC: 12.5 G/DL — SIGNIFICANT CHANGE UP (ref 11.5–15.5)
LYMPHOCYTES # BLD AUTO: 2.22 K/UL — SIGNIFICANT CHANGE UP (ref 1–3.3)
LYMPHOCYTES # BLD AUTO: 22.8 % — SIGNIFICANT CHANGE UP (ref 13–44)
MAGNESIUM SERPL-MCNC: 2 MG/DL — SIGNIFICANT CHANGE UP (ref 1.6–2.6)
MANUAL SMEAR VERIFICATION: SIGNIFICANT CHANGE UP
MCHC RBC-ENTMCNC: 20.8 PG — LOW (ref 27–34)
MCHC RBC-ENTMCNC: 30.8 GM/DL — LOW (ref 32–36)
MCV RBC AUTO: 67.7 FL — LOW (ref 80–100)
MICROCYTES BLD QL: SLIGHT — SIGNIFICANT CHANGE UP
MONOCYTES # BLD AUTO: 0.59 K/UL — SIGNIFICANT CHANGE UP (ref 0–0.9)
MONOCYTES NFR BLD AUTO: 6.1 % — SIGNIFICANT CHANGE UP (ref 2–14)
NEUTROPHILS # BLD AUTO: 6.74 K/UL — SIGNIFICANT CHANGE UP (ref 1.8–7.4)
NEUTROPHILS NFR BLD AUTO: 69.3 % — SIGNIFICANT CHANGE UP (ref 43–77)
NT-PROBNP SERPL-SCNC: 437 PG/ML — HIGH (ref 0–300)
OVALOCYTES BLD QL SMEAR: SLIGHT — SIGNIFICANT CHANGE UP
PCO2 BLDV: 46 MMHG — HIGH (ref 39–42)
PH BLDV: 7.35 — SIGNIFICANT CHANGE UP (ref 7.32–7.43)
PLAT MORPH BLD: ABNORMAL
PLATELET # BLD AUTO: 383 K/UL — SIGNIFICANT CHANGE UP (ref 150–400)
PO2 BLDV: 29 MMHG — SIGNIFICANT CHANGE UP (ref 25–45)
POIKILOCYTOSIS BLD QL AUTO: SIGNIFICANT CHANGE UP
POTASSIUM SERPL-MCNC: 3.8 MMOL/L — SIGNIFICANT CHANGE UP (ref 3.5–5.3)
POTASSIUM SERPL-SCNC: 3.8 MMOL/L — SIGNIFICANT CHANGE UP (ref 3.5–5.3)
PROT SERPL-MCNC: 6.7 G/DL — SIGNIFICANT CHANGE UP (ref 6–8.3)
RBC # BLD: 6 M/UL — HIGH (ref 3.8–5.2)
RBC # FLD: 20.2 % — HIGH (ref 10.3–14.5)
RBC BLD AUTO: ABNORMAL
SAO2 % BLDV: 36.5 % — LOW (ref 67–88)
SODIUM SERPL-SCNC: 139 MMOL/L — SIGNIFICANT CHANGE UP (ref 135–145)
TROPONIN T, HIGH SENSITIVITY RESULT: <6 NG/L — SIGNIFICANT CHANGE UP (ref 0–51)
WBC # BLD: 9.72 K/UL — SIGNIFICANT CHANGE UP (ref 3.8–10.5)
WBC # FLD AUTO: 9.72 K/UL — SIGNIFICANT CHANGE UP (ref 3.8–10.5)

## 2023-10-13 PROCEDURE — 71045 X-RAY EXAM CHEST 1 VIEW: CPT | Mod: 26

## 2023-10-13 PROCEDURE — 99285 EMERGENCY DEPT VISIT HI MDM: CPT

## 2023-10-13 RX ORDER — IPRATROPIUM BROMIDE 0.2 MG/ML
1 SOLUTION, NON-ORAL INHALATION EVERY 6 HOURS
Refills: 0 | Status: DISCONTINUED | OUTPATIENT
Start: 2023-10-13 | End: 2023-10-13

## 2023-10-13 RX ORDER — IPRATROPIUM/ALBUTEROL SULFATE 18-103MCG
3 AEROSOL WITH ADAPTER (GRAM) INHALATION ONCE
Refills: 0 | Status: DISCONTINUED | OUTPATIENT
Start: 2023-10-13 | End: 2023-10-13

## 2023-10-13 RX ORDER — IPRATROPIUM BROMIDE 0.2 MG/ML
500 SOLUTION, NON-ORAL INHALATION ONCE
Refills: 0 | Status: COMPLETED | OUTPATIENT
Start: 2023-10-13 | End: 2023-10-13

## 2023-10-13 RX ORDER — ACETAMINOPHEN 500 MG
1000 TABLET ORAL ONCE
Refills: 0 | Status: COMPLETED | OUTPATIENT
Start: 2023-10-13 | End: 2023-10-13

## 2023-10-13 RX ORDER — MECLIZINE HCL 12.5 MG
25 TABLET ORAL ONCE
Refills: 0 | Status: COMPLETED | OUTPATIENT
Start: 2023-10-13 | End: 2023-10-13

## 2023-10-13 RX ORDER — ONDANSETRON 8 MG/1
4 TABLET, FILM COATED ORAL ONCE
Refills: 0 | Status: COMPLETED | OUTPATIENT
Start: 2023-10-13 | End: 2023-10-13

## 2023-10-13 RX ADMIN — Medication 400 MILLIGRAM(S): at 22:32

## 2023-10-13 RX ADMIN — Medication 500 MICROGRAM(S): at 22:31

## 2023-10-13 RX ADMIN — Medication 25 MILLIGRAM(S): at 22:19

## 2023-10-13 NOTE — ED ADULT NURSE NOTE - NSFALLUNIVINTERV_ED_ALL_ED
Bed/Stretcher in lowest position, wheels locked, appropriate side rails in place/Call bell, personal items and telephone in reach/Instruct patient to call for assistance before getting out of bed/chair/stretcher/Non-slip footwear applied when patient is off stretcher/[place] to call system/Physically safe environment - no spills, clutter or unnecessary equipment/Purposeful proactive rounding/Room/bathroom lighting operational, light cord in reach

## 2023-10-13 NOTE — ED PROVIDER NOTE - ATTENDING CONTRIBUTION TO CARE
I performed a history and physical exam of the patient and discussed their management with the resident and /or advanced care provider. I reviewed the resident and /or ACP's note and agree with the documented findings and plan of care. My medical decision making and observations are found above.  Lungs clear, abd soft, speaking full sentences. nl RR

## 2023-10-13 NOTE — ED ADULT NURSE NOTE - OBJECTIVE STATEMENT
42 y/o F with PMHx of pulmonary HTN, pacemaker, asthma, CHF, anemia presents to the ED with complaints of dizziness today. Patient states she was getting up to get something to eat when she felt dizzy, described as lightheadedness. Patient notes associated weakness, nausea, and vomiting at that time. Reports headache at present 7/10 in severity. Patient on 2L NC at baseline. AOx4 and speaking coherently. Breathing is unlabored on nasal cannula. Abdomen is soft, nondistended, and nontender. Discoloration noted to RLQ, patient states abscess "bust" earlier this week. <2s capillary refill. Full ROM of all extremities.

## 2023-10-13 NOTE — ED PROVIDER NOTE - NSICDXPASTSURGICALHX_GEN_ALL_CORE_FT
PAST SURGICAL HISTORY:  H/O tubal ligation     History of pacemaker 12/19 - [employer] model Ingevity 4469    History of tubal ligation     Pacemaker

## 2023-10-13 NOTE — ED PROVIDER NOTE - CLINICAL SUMMARY MEDICAL DECISION MAKING FREE TEXT BOX
43-year-old history of asthma PE multiple abdominal abscesses bivent heart failure with pulmonary hypertension here for  acute chest pressure dizziness shortness of breath with exertion that started tonight after dinner.  Patient says she had vertigo in the past but this did not feel like that.  On arrival persistent dizziness worse with head movement and chest pressure which she describes as burning.  Arrives on 2 L of nasal cannula.  In no acute distress.  Vitals within normal limits. EKG diffuse t inversions w p pulmonale and RHS, concsistent w RHF, no change from prior.  On exam minimal crackles and expiratory wheezing on exhalation and mild pitting edema.  Concern for ACS potentially secondary to heart failure exacerbation versus asthma exacerbation versus PE contributing to dizziness.  Patient stable will get basic labs CT PE treated as asthma exac 43-year-old history of asthma PE multiple abdominal abscesses bivent heart failure with pulmonary hypertension here for  acute chest pressure dizziness shortness of breath with exertion that started tonight after dinner.  Patient says she had vertigo in the past but this did not feel like that.  On arrival persistent dizziness worse with head movement and chest pressure which she describes as burning.  Arrives on 2 L of nasal cannula.  In no acute distress.  Vitals within normal limits. EKG diffuse t inversions w p pulmonale and RHS, concsistent w RHF, no change from prior.  On exam minimal crackles and expiratory wheezing on exhalation and mild pitting edema.  Concern for ACS potentially secondary to heart failure exacerbation versus asthma exacerbation versus PE contributing to dizziness.  Patient stable will get basic labs CT PE treated as asthma ivett Sanches: Patient is a 43-year-old with multiple medical problems including pulmonary hypertension and clotting who presents with an episode of dizziness and chest pressure and shortness of breath.  Patient now feels okay.  Patient is dizziness does get worse with motion.  Patient is high risk risk for PE even though she is on anticoagulation.  Would evaluate for chest pain and cardiac causes and PE.  If everything is completely negative and she feels well at the end we can discuss outpatient management.

## 2023-10-13 NOTE — ED PROVIDER NOTE - PROGRESS NOTE DETAILS
SG: pt well appearing, CT scan negative for PE, showing b/l patchy opacities, likely viral pneumonia. patient has follow up apt w pulmonologist on monday, and says she has prednisone at home which she is taking.

## 2023-10-13 NOTE — ED ADULT NURSE NOTE - NSICDXPASTSURGICALHX_GEN_ALL_CORE_FT
PAST SURGICAL HISTORY:  H/O tubal ligation     History of pacemaker 12/19 - [employer] model Ingevity 4469    History of tubal ligation     Pacemaker

## 2023-10-13 NOTE — ED PROVIDER NOTE - PHYSICAL EXAMINATION
GENERAL: well appearing in no acute distress, non-toxic appearing  HEAD: normocephalic, atraumatic  CARDIAC: regular rate and rhythm, normal S1S2, no appreciable murmurs, 2+ pulses in UE/LE b/l  PULM: normal breath sounds, crackles b/l bases, mild expiratory wheezing  GI: abdomen nondistended, soft, nontender, no guarding, rebound tenderness  NEURO: no focal motor or sensory deficits, CN2-12 intact, normal speech, horizontal nystagmus reproducible on exam, EOM intact.  MSK: b/l non pitting edema  SKIN: well-perfused, extremities warm, no visible rashes  PSYCH: appropriate mood and affect

## 2023-10-13 NOTE — ED PROVIDER NOTE - PATIENT PORTAL LINK FT
You can access the FollowMyHealth Patient Portal offered by [facility] by registering at the following website: [email or web address]. By joining [facility]’s FollowMyHealth portal, you will also be able to view your health information using other applications (apps) compatible with our system.

## 2023-10-13 NOTE — ED PROVIDER NOTE - OBJECTIVE STATEMENT
43-year-old female w/ PMH of pulmonary HTN c/b right heart failure, multiple abdominal wall abscesses, right atrial thrombus s/p thrombectomy, asthma, anemia here for acute room spinning dizziness, chest pressure and SOB. also endorses leg swelling. no changes in meds. denies recent f/c/abd pain/diarrhea/dysuria.

## 2023-10-13 NOTE — ED PROVIDER NOTE - NSFOLLOWUPINSTRUCTIONS_ED_ALL_ED_FT
No signs of emergency medical condition on today's workup.  Presumptive diagnosis made as a virus, likely exacerbating your headache and reactive airway disease, but further evaluation may be required by your primary care doctor or specialist for a definitive diagnosis.  Your CT scan was negative for PE. You were given one dose of prednisone. Please follow up with your primary doctor, and pulmonologist within the next week. Return with any worsening shortness of breath, or any other concerning symptoms.    Therefore, follow up as directed and if symptoms change/worsen or any emergency conditions, please return to the ER.

## 2023-10-14 VITALS
DIASTOLIC BLOOD PRESSURE: 76 MMHG | OXYGEN SATURATION: 94 % | HEART RATE: 88 BPM | RESPIRATION RATE: 20 BRPM | SYSTOLIC BLOOD PRESSURE: 109 MMHG | TEMPERATURE: 99 F

## 2023-10-14 PROCEDURE — 96365 THER/PROPH/DIAG IV INF INIT: CPT | Mod: XU

## 2023-10-14 PROCEDURE — 94640 AIRWAY INHALATION TREATMENT: CPT

## 2023-10-14 PROCEDURE — 83880 ASSAY OF NATRIURETIC PEPTIDE: CPT

## 2023-10-14 PROCEDURE — 80053 COMPREHEN METABOLIC PANEL: CPT

## 2023-10-14 PROCEDURE — 96375 TX/PRO/DX INJ NEW DRUG ADDON: CPT | Mod: XU

## 2023-10-14 PROCEDURE — 71275 CT ANGIOGRAPHY CHEST: CPT | Mod: 26,MA

## 2023-10-14 PROCEDURE — 84484 ASSAY OF TROPONIN QUANT: CPT

## 2023-10-14 PROCEDURE — 85025 COMPLETE CBC W/AUTO DIFF WBC: CPT

## 2023-10-14 PROCEDURE — 83735 ASSAY OF MAGNESIUM: CPT

## 2023-10-14 PROCEDURE — 71275 CT ANGIOGRAPHY CHEST: CPT | Mod: MA

## 2023-10-14 PROCEDURE — 96366 THER/PROPH/DIAG IV INF ADDON: CPT

## 2023-10-14 PROCEDURE — 71045 X-RAY EXAM CHEST 1 VIEW: CPT

## 2023-10-14 PROCEDURE — 93005 ELECTROCARDIOGRAM TRACING: CPT

## 2023-10-14 PROCEDURE — 99285 EMERGENCY DEPT VISIT HI MDM: CPT | Mod: 25

## 2023-10-14 PROCEDURE — 82803 BLOOD GASES ANY COMBINATION: CPT

## 2023-10-14 RX ORDER — KETOROLAC TROMETHAMINE 30 MG/ML
30 SYRINGE (ML) INJECTION ONCE
Refills: 0 | Status: DISCONTINUED | OUTPATIENT
Start: 2023-10-14 | End: 2023-10-14

## 2023-10-14 RX ADMIN — Medication 30 MILLIGRAM(S): at 03:44

## 2023-10-14 RX ADMIN — Medication 1000 MILLIGRAM(S): at 03:33

## 2023-10-14 RX ADMIN — Medication 20 MILLIGRAM(S): at 03:24

## 2023-10-14 NOTE — ED ADULT NURSE REASSESSMENT NOTE - NS ED NURSE REASSESS COMMENT FT1
Patient notes headache returning, requesting additional medications prior to discharge. MD Trujillo is aware.

## 2023-10-16 ENCOUNTER — APPOINTMENT (OUTPATIENT)
Dept: PULMONOLOGY | Facility: CLINIC | Age: 43
End: 2023-10-16

## 2023-11-05 NOTE — PROGRESS NOTE ADULT - SUBJECTIVE AND OBJECTIVE BOX
ASSESSMENT/PLAN:    MDM #1) patient endorses progressive epigastric and right upper quadrant pain x 2 days duration,   With intermittent nausea.  Patient states she can no longer eat anything due to severe pain brought on by eating.  Patient also reports a personal history of gallbladder problems and GI ulcer.  I advised patient to report to the emergency room now where she can be screened for emergent etiologies (such as cholelithiasis, and pancreatitis amongst others)     MDM #2)   Prolonged URI with interval worsening and associated reactive airway component. Favor secondary bacterial infection. Occult pneumonia is in differential. No respiratory distress requiring further ER or hospital inpatient management now. Treatment plan as per below. I have advised low threshold for medical follow-up if symptoms fail to improve in the next several days, resolve in the next 10 days, and sooner if any worsening.  Criteria for urgent and emergent follow-up are also reviewed.    I phoned ahead to [facility] emergency room for patient today and gave above abdominal pain report to ER RN.  They are expecting her arrival via private car shortly.        Discharge Summary/Plan (which I reviewed with patient verbally today and provided in printed form for home review)-     #1) November 5, 2023  [facility]  Care Plan For Your 2.5 week history of cough and wheezing that has now become productive:       1. Start the Z-Pack antibiotic for your lung infection    2. Use the Albuterol inhaler I prescribed (2 puffs every 4-6 hours) as needed for wheezing     3. Follow-up with your primary care provider or urgent care if your symptoms fail to improve after 3 days of treatment provided here today, if your symptoms are not all gone in 10 days, andsooner if any sudden, severe, worsening.     #2)  Abdominal Pain and Nausea     I advise you go to the emergency room now for evaluation of your abdominal pain and your report of the below symptoms  "over the past 2 days (with interval worsening):     -2 day history of pain around belly button and right upper quadrant of abdomen and intermittent nausea     -Now unable to eat due to severe pain brought on by eating \"anything\"     -Let the ER nurse and doctor know you report a prior history of gall bladder problems and stomach ulcers    -Patient is directed to remain n.p.o.    (R10.11) RUQ abdominal pain  (primary encounter diagnosis)    (R10.13) Epigastric pain      (R11.0) Nausea      (J98.8) Wheezing-associated respiratory infection (WARI)  Plan: azithromycin (ZITHROMAX) 250 MG tablet,         albuterol (PROAIR HFA/PROVENTIL HFA/VENTOLIN         HFA) 108 (90 Base) MCG/ACT inhaler  (R07.0) Throat pain    Plan: Streptococcus A Rapid Screen w/Reflex to PCR -         Clinic Collect, Symptomatic COVID-19 Virus         (Coronavirus) by PCR Nose, Group A         Streptococcus PCR Throat Swab      (R06.2) Wheezing  Plan: albuterol (PROAIR HFA/PROVENTIL HFA/VENTOLIN         HFA) 108 (90 Base) MCG/ACT inhaler        This progress note has been dictated, with use of voice recognition software. Any grammatical, typographical, or context errors are unintentional and inherent to use of voice recognition software.  -------------------          Chief Complaint   Patient presents with    Cough    Pharyngitis    Abdominal Pain     RUQ and around  belly button pain severe over past 2 days. Now cannot eat anything due to severe pain. History of GB problems and gastric ulcer.              SUBJECTIVE:    Romy Aldridge presents to urgent care today for evaluation of the below concerns:    #1) patient reports she has had progressive abdominal pain (points to the right upper quadrant pain and epigastric area) x 2 days duration.  She reports pain is more severe today and tells me she is now unable to eat any food due to increased severe pain after eating \"anything\".    Of note-by patient report, she has a history of \"gallbladder " Patient is a 40y old  Female who presents with a chief complaint of Referred by Pulmonologist for tachycardia (27 Oct 2020 18:54)      SUBJECTIVE / OVERNIGHT EVENTS: No events over night.   ROS otherwise negative.     T(C): 36.4 (10-27-20 @ 13:35), Max: 36.4 (10-27-20 @ 13:35)  HR: 103 (10-27-20 @ 13:35) (103 - 111)  BP: 117/82 (10-27-20 @ 13:35) (108/70 - 117/82)  RR: 18 (10-27-20 @ 13:35) (18 - 18)  SpO2: 100% (10-27-20 @ 13:35) (99% - 100%)    MEDICATIONS  (STANDING):  furosemide   Injectable      ipratropium    for Nebulization 500 MICROGram(s) Nebulizer every 6 hours  pantoprazole    Tablet 40 milliGRAM(s) Oral before breakfast  predniSONE   Tablet 7 milliGRAM(s) Oral daily  rivaroxaban 10 milliGRAM(s) Oral daily  Treprostinil (Remodulin) 2.5mg/mL 7 milliGRAM(s) 7 milliGRAM(s) SubCutaneous Continuous Pump    MEDICATIONS  (PRN):  acetaminophen   Tablet .. 650 milliGRAM(s) Oral every 6 hours PRN Mild Pain (1 - 3)  ipratropium 17 MICROgram(s) HFA Inhaler 1 Puff(s) Inhalation every 6 hours PRN SOB    PHYSICAL EXAM:  GENERAL: NAD, well-developed  HEAD:  Atraumatic, Normocephalic  EYES: EOMI, conjunctiva and sclera clear  NECK: Supple, No JVD  CHEST/LUNG: Crackles at bases, no wheeze  HEART: Regular rate and rhythm; No murmurs, rubs, or gallops  ABDOMEN: Soft, Nontender, Nondistended; Bowel sounds present  EXTREMITIES:  2+ Peripheral Pulses, No clubbing, cyanosis, or edema  PSYCH: AAOx3  NEUROLOGY: non-focal  SKIN: No rashes or lesions                               10.9   9.06  )-----------( 445      ( 26 Oct 2020 16:14 )             38.2           LIVER FUNCTIONS - ( 26 Oct 2020 16:14 )  Alb: 4.3 g/dL / Pro: 7.3 g/dL / ALK PHOS: 62 U/L / ALT: 9 U/L / AST: 15 U/L / GGT: x           PT/INR - ( 26 Oct 2020 16:14 )   PT: 14.2 sec;   INR: 1.19 ratio         PTT - ( 26 Oct 2020 16:14 )  PTT:34.5 sec            RADIOLOGY & ADDITIONAL TESTS:    Imaging Personally Reviewed:    Consultant(s) Notes Reviewed:      Care Discussed with Consultants/Other Providers:   "problems\" and states she has a history of stomach ulcers.    Associated Symptoms: Positive for waxing and waning nausea.  Nausea is reportedly increased after eating.  No vomiting.  Last bowel movement was this morning (no black or bloody stools).      #2) patient states she has had a prolonged cough, now approximately 2.5 weeks in duration with interval worsening.  Initial symptoms sound consistent with viral upper respiratory infection.  Over the past week, patient states cough has changed/worsened, become productive, and has also developed wheezing over the past week.      Associated Symptoms: Positive for sore throat and nasal congestion.  Patient reports she initially had subjective fever symptoms, but no fever symptoms now.    RESP HX: Sounds like she has had some need for albuterol in the past with upper respiratory infection trigger.  No prior history of hospitalization for respiratory distress. No formal diagnosis of asthma or COPD.                ROS: Positive as per above.  No high fever or shaking chills. No associated coughing up of blood, blue lips/fingers/toes, or severe shortness of breath (confirms they are still able to to all self cares and activities of daily living). No acute fainting, chest pain, racing or irregular heartbeats.  No diarrhea. No severe body aches, severe headaches, rashes, hives, joint swelling or other acute illness symptoms. No diarrhea.  No history of abdominal aortic aneurysm.  No dysuria, hematuria, or UTI symptoms.  No history of kidney stones.      No past medical history on file.    Patient Active Problem List   Diagnosis    Essential hypertension, benign    Health Care Home    Chronic bilateral low back pain, unspecified whether sciatica present    Multiple sclerosis (H)    History of hepatitis C       Current Outpatient Medications   Medication    cyclobenzaprine (FLEXERIL) 10 MG tablet    medical cannabis liquid    traZODone (DESYREL) 50 MG tablet    vitamin B complex " with vitamin C (STRESS TAB) tablet    VITAMIN D, CHOLECALCIFEROL, PO     No current facility-administered medications for this visit.       Allergies   Allergen Reactions    Gadoversetamide Difficulty breathing and Nausea and Vomiting    Iodinated Contrast Media Anaphylaxis    Contrast Dye      Pt is unsure whether it was CT or MRI contrast that she had a reaction to a long time ago.  SHARDA Redmond () [facility] MRI Department.          OBJECTIVE:  /86   Pulse 89   Temp 98.4  F (36.9  C) (Tympanic)   Wt 67.6 kg (149 lb)   SpO2 96%   BMI 26.39 kg/m        General appearance: alert and no apparent distress  Skin color is uniform in color and without rash.  HEENT:   Conjunctiva not injected.  Sclera clear.  Left TM is normal: no effusions, no erythema, and normal landmarks.  Right TM is normal: no effusions, no erythema, and normal landmarks.  Nasal mucosa is Congested  Oropharyngeal exam is normal: no lesions, erythema, adenopathy or exudate.  NECK: Trachea is midline. Neck is supple, FROM with no adenopathy  CARDIAC:NORMAL - regular rate and rhythm without murmur.  RESP: No increased work of breathing at rest--able to speak full sentences without pause. Positive for scattered rhonchi and scattered wheezes (wheezes only noted with forced expiration/coughing). No rales. Still moving air well into all listening areas today.   ABDOMEN: Positive for epigastric and right upper quadrant tenderness on exam.  Abdomen is soft throughout.  No right lower quadrant pain.  No left upper quadrant or left lower quadrant pain.  No guarding or rebound tenderness.  No CVA tenderness.    NEURO: Alert and oriented.  Normal speech and mentation.  CN II/XII grossly intact.  Gait within normal limits.      Results for orders placed or performed in visit on 11/05/23   Streptococcus A Rapid Screen w/Reflex to PCR - Clinic Collect     Status: Normal    Specimen: Throat; Swab   Result Value Ref Range    Group A  Strep antigen Negative Negative       Strep PCR test result pending  COVID PCR test result pending

## 2023-11-29 RX ORDER — LECITHIN/ISOPROPYL PALMITATE
LIQUID (ML) MISCELLANEOUS
Qty: 1 | Refills: 11 | Status: ACTIVE | OUTPATIENT
Start: 2020-06-03

## 2023-11-30 NOTE — ED PROVIDER NOTE - INPATIENT RESIDENT/ACP NOTIFIED
Called [facility] representative Sana at [phone]. I spoke to representative Tonie. I informed her of the conversation with patient and how he wanted us to check with Sana that he can have surgery on 01/01/2024. She IM Sana and Sana told her the response of the conversation. Sana also stated that patient called 2 hours ago about the issue once again with his plan. Sana told Tonie to inform me that they discussed benefits on their conversation. She stated that if we [employer] accept his insurance then the surgery will be covered. If we do not then it will count as out of network. She did stated that patient does have out of network benefits. But given that starting on 01/01/2024 patient will be out of network with [employer]. The surgery will have to count as an out of network cost only if [employer] acknowledges patient's plan. Ref# for call is [id number].  
RAYMOND Craven

## 2023-12-13 ENCOUNTER — APPOINTMENT (OUTPATIENT)
Dept: ELECTROPHYSIOLOGY | Facility: CLINIC | Age: 43
End: 2023-12-13

## 2023-12-22 ENCOUNTER — APPOINTMENT (OUTPATIENT)
Dept: PULMONOLOGY | Facility: CLINIC | Age: 43
End: 2023-12-22
Payer: MEDICAID

## 2023-12-22 VITALS
OXYGEN SATURATION: 83 % | HEIGHT: 65 IN | HEART RATE: 136 BPM | TEMPERATURE: 98 F | BODY MASS INDEX: 36.82 KG/M2 | RESPIRATION RATE: 15 BRPM | WEIGHT: 221 LBS | DIASTOLIC BLOOD PRESSURE: 72 MMHG | SYSTOLIC BLOOD PRESSURE: 110 MMHG

## 2023-12-22 DIAGNOSIS — D64.9 ANEMIA, UNSPECIFIED: ICD-10-CM

## 2023-12-22 PROCEDURE — 99214 OFFICE O/P EST MOD 30 MIN: CPT | Mod: 25

## 2023-12-22 PROCEDURE — 96372 THER/PROPH/DIAG INJ SC/IM: CPT

## 2023-12-22 RX ORDER — AZITHROMYCIN 250 MG/1
250 TABLET, FILM COATED ORAL
Qty: 1 | Refills: 0 | Status: DISCONTINUED | COMMUNITY
Start: 2023-09-21 | End: 2023-12-22

## 2023-12-22 RX ORDER — PREDNISONE 5 MG/1
5 TABLET ORAL DAILY
Qty: 270 | Refills: 0 | Status: ACTIVE | COMMUNITY
Start: 2023-05-19 | End: 1900-01-01

## 2023-12-22 RX ORDER — ONDANSETRON 4 MG/1
4 TABLET, ORALLY DISINTEGRATING ORAL
Qty: 60 | Refills: 1 | Status: ACTIVE | COMMUNITY
Start: 2023-01-10 | End: 1900-01-01

## 2023-12-22 RX ORDER — LEVOFLOXACIN 500 MG/1
500 TABLET, FILM COATED ORAL DAILY
Qty: 7 | Refills: 0 | Status: ACTIVE | COMMUNITY
Start: 2023-12-22 | End: 1900-01-01

## 2023-12-22 RX ORDER — FLUTICASONE PROPIONATE 50 UG/1
50 SPRAY, METERED NASAL DAILY
Qty: 3 | Refills: 3 | Status: ACTIVE | COMMUNITY
Start: 2023-09-21 | End: 1900-01-01

## 2023-12-22 RX ADMIN — Medication 0.5 MG/ML: at 00:00

## 2023-12-22 NOTE — HISTORY OF PRESENT ILLNESS
[Never] : never [TextBox_4] : --43  female----------Patient is here for follow up of her pulmonary htn. ------------------INITIALLY  REFD  WITH   ECHO [DONE  OUTSIDE ] ELEVATED PASP  DILATED RV AND RA---------  HAS BEEN TOLD IN THE PAST SHE HAS ??? ASTHMA-----long-standing history of dyspnea on exertion-----------------has baseline tachycardia ----...--s. No history of liver dysfunction , collagen vascular disorder or chronic thromboembolic disease. I would classify her dyspnea as WHO  FUNCTIONAL CLASS III-----------no calf tenderness----------SLEEP STUDY SHOWS AHI 0.6 but T 90  < 35 %-----DIAGNOSED  AS PAH ---------WAS ON ADMAPAS  NOW ON   SQ REMODULIN  [ SINCE MAY 2018]  ---S/P PACEMAKER PLACEMENT AT [place]  AND ON METOPROLOL-----   2/3/2022 tested positive on home covid test 2/2/2022- developed shortness of breath, nausea and vomitting- son is positive She is vaccinated for covid but not boosted Afebrile, drinking but not eating well. O2 sat 94% room air rest- 92% room air w/walk  2/7/2022 covid pcr negative - did not get MAB still with SOUZA - on prednisone with taper  4/2022 6mwt o2 sat 95% to 94% on oxygen 69meters (135m) stopped d/t severe SOB and elevated HR  4/15/2022  pulm htn - on remodulin 36 ng (having jaw pain sometimes), sildenafil 10 mg qd, furosemide 20 mg 5x weekly which helped w/edema. She is also having palpitations- has not yet followed up with DR Chavis in cardiology. Asthma-occas wheezing- awaiting start of dupixent Up to date w/covid vac- declines influenza vac. s/p consult with lung transplant team but needs to lower BMI before being considered- pt has been referred to nutritionist but has not scheduled appt   5/31/22- pulm htn - on remodulin 36 ng SQ (having jaw pain sometimes), sildenafil 10 mg qd, and diuretics daily- doing well from pulm perspectives Asthma currently under control- declining biologic therapy Was following lung  transplant team but currently on hold d/t BMI   7/11/22- pulm htn--on remodulin 36 ng SQ (having jaw pain sometimes), sildenafil 10 mg qd and diuretics daily [LASIX WO MG---FAZAL  was in the hospital at [facility]-- given Lasix --- we will slowly increase her dose of Remodulin  7/22/2022 pulm htn- breathing improved on higher dose of remodulin 40ng, having some diarrhea. Palpitations improved  9/15 /2022 ----s/p hospitalization for cardiogenic shock due to RV failure with markers of end organ dysfunction, started on  as well as Marcela. CTA negative for PE but TTE revealed RA thrombus with concern for clot in transit. She underwent catheter directed partial thrombus aspiration 8/31, course complicated by L fem pseudoaneurysm for which she received thrombin injection on 9/3. Course further complicated by S. Lugdunensis bacteremia on BCx from 8/29 s/p drainage of abscess at prior----on IV antibiotics as per ID Remodulin subcutaneous access site.--42 ng/kg/min , also on sildenafil 20 mg 3 times daily ----   SEPT 27 2022-----feels much better on subcu Remodulin 44 ng/kg/min and sildenafil 20 mg 3 times daily on diuretics-on anticoagulation-------- needs to see vascular for left femoral pseudoaneurysm--- H/O---S.Lugdunensis bacteremia on BCx from 8/29 s/p drainage of abscess at prior----on IV antibiotics as per ID --------ALSO HAS Lleft   fem pseudoaneurysm---needs follow up by  vasular ---- --received flu vaccine today------------------------  October 2022: Telehealth Visit via teledoc S/P hospitalization for cardiogenic shock c/b bacteremia on IV antibiotics. Followed by ID (Dr. Grover) for labs, antibiotics now d/c'd (10/6)  getting follow up labs w/ID (results pending from 10/19) Pulm htn -She reports use of Remodulin 44 ng/kg/min, tolerating new dose well  and Sildenafil (20 mg t.i.d PO) No resp complaints, no palpitations, follows cardiology Dr Paramjit Chavis. On diuretics, xarelto, O2 as needed s/p lung transplant eval- was told she was "too well" to be listed and needs to work on getting her BMI down  jan 2022--developed abdominal wall abscess--needs surgical drainage--------subcu Remodulin 44 ng/kg/min and sildenafil 20 mg 3 times daily on diuretics-on anticoagulation--  mario    10 2023--- recent admission to the hospital for abscess on abdominal wall which was drained and received antibiotics and it has healed----------------subcu Remodulin 44 ng/kg/min and sildenafil 20 mg 3 times daily on diuretics-on anticoagulation--I would like to increase Remodulin to 45 ng/kg/min--------- having some URI symptoms we will send a nasal swab continue diuretics----   3/2023---seems stable  pulm htn -remains on remodulin 44ng and sildenafil, diuretics per heart failure Dr Chavis c/o worsening SOUZA and nausea x 1week, chroic diarrhea which she attributes to remodulin, and lower abd pain ///??? pancreatitis Tacchycardia has improved, on continuous oxygen Accompanied by    5/2023 accompanied by  pulm htn -remains on remodulin 44ng and sildenafil, diuretics per heart failure Dr Chavis old remodulin site healing w/minimal drainage (previously treated w/antibiotics)  c/o nausea, lightheadedness, headache  june 2023--------accompanied by  pulm htn -remains on remodulin 46ng and sildenafil, diuretics per heart failure Dr Chavis -feesl btter--- less  nausea,---will increase   Remodulin to to 48 ng/kg/min  8/2023 43-year-old female w/ PMH of pulmonary HTN c/b right heart failure, multiple abdominal wall abscesses, right atrial thrombus s/p thrombectomy, asthma, anemia B WAS  admitted for right heart failure exacerbation in s/o pulmonary HTN. IN AUGUST 2023----  -  Problem: Right heart failure.-----  Plan: - In s/o pulmonary HTN - On remodulin, slidenafil, xarelto at home - Lasix 40mg qid, spironolactone 25 mg qd at home - On 2L O2 at home - - S/p diuresis with 40 mg IV lasix qd, per HF -- TTE (8/14) -> EF 55-60%, RV overload -- RHC (8/16) -> Elevated RV pressure c/w pulmonary HTN, elevated PCWP  [ BELIEVE THIS WEDGE PRESSURE IS ERRONEOUS AS PT HAS PREVIOSU EVDIECNE OF PRECAPILLARY PULMONARY HTN ] - - - - -  -At discharge patient was switched to  PO torsemide 20mg qd, per HF -------, using oxygen.   ------Pulm htn On remodulin 48ng- c/o diarrhea remains on sildenafil 20mg tid   12/2023 accomapnied by  , using oxygen.   ------Pulm htn On remodulin 48ng-diuretics - -  asthma/URI exacerbation- nasal congestion ---WILL GIVE ANTIBIOTCS AND STEROIDS- - - - - [TextBox_100] : 1/2014 [TextBox_108] : 0.9 [TextBox_112] : 35.3

## 2023-12-22 NOTE — DISCUSSION/SUMMARY
[FreeTextEntry1] : ----Assessment and Plan--------43 year-old lady with PULMONARY ARTERIAL HYPERTENSION S/P PPM FOR TACHYCARDIA AT [place] ON REMODULIN - S/P LEFT BREAST BIOPSY   doing well on remodulin 38 ng ----- ON XARELTO, LASIX AND ALDACTONE -------LOW DOSE PREDNISONE -----is following up with at [facility] for transplant listing-----final decision after the  and psych evaluation  JAN 2023---- ABDOMINAL WALL ABSCESS-----needED surgical drainage-doneE MARCH 2023   --- pain abdomen rule out pancreatitis AUGUST 2023--- admitted with fluid overload was treated with diuretics  1. PULMONARY ART HTN   ---- WHO GROUP I FUNCTIONAL CLASS III ----  [AS PER DR FREEMAN WHO DID PULM ANGIO SHE HAS FEATURES OF GROUP I WITH SOME   EVIDENCE OF DISTAL CLOTS]   She is on Remodulin 48 NG/KG/MIN  -plan to increase in jan 2024  Continue  Sildenafil 20mg qd, Xarelto, ON torsemide  AND ALDACTONE 25 MG - f/u with heart failure team  2. Asthma -. use  ipratropium and levalbuterol and budesonide. Refuses use of DUPIXENT at this time as she reports her allergies are "not that bad". Has has been previously  steroid dependent likely contributing to her weight. She is hesitant to start biologic injections.  Has discontinued Flonase and Dymysta nasal spray  - URI_ start levaquin, pred 15mg, s/p resp rvp swab   3. High BMI (41.9 kg/m2)   Precluding lung transplant   Referred to nutritionist------ extensive nutritional advice given--- she promises to follow-up on thAT   Weight loss program advised  4. Oxygen medically necessary given severe Pulm Htn, anemia and h/o nocturnal desaturation which could worsen Pulm HTN. Advised oxygen 2 lpm x 24hrs  5. She is on Xarelto---will be on lifelong anticoagulation---  6) She has features of ERICA- including sleep disturbance, nocturnal desat, and excessive fatigue- HST was negative for ERICA in 2014 but had desaturation. Will try to repeat HST at next visit  8. Follows /[place] [facility] Team----  ---Also consulted with Dr Lou at [facility]---   As per patient, Dr Lou feels she is not a candidate at this time because of good health   Although Dr. Lou advised needs to lose wt to be considered for transplant listing in future  9.chronic diarrhea- lomotil and flagyl 10. labs drawn in our office today 11. f/u in 2m  Vivian Yoon MD, [facility]

## 2023-12-23 ENCOUNTER — NON-APPOINTMENT (OUTPATIENT)
Age: 43
End: 2023-12-23

## 2023-12-24 ENCOUNTER — NON-APPOINTMENT (OUTPATIENT)
Age: 43
End: 2023-12-24

## 2023-12-27 LAB
RAPID RVP RESULT: NOT DETECTED
SARS-COV-2 RNA PNL RESP NAA+PROBE: NOT DETECTED

## 2023-12-29 RX ORDER — METHYLPRED ACET/NACL,ISO-OS/PF 40 MG/ML
40 VIAL (ML) INJECTION
Qty: 1 | Refills: 0 | Status: COMPLETED | OUTPATIENT
Start: 2023-12-22

## 2024-01-23 ENCOUNTER — NON-APPOINTMENT (OUTPATIENT)
Age: 44
End: 2024-01-23

## 2024-03-13 ENCOUNTER — APPOINTMENT (OUTPATIENT)
Dept: ELECTROPHYSIOLOGY | Facility: CLINIC | Age: 44
End: 2024-03-13
Payer: MEDICAID

## 2024-03-13 ENCOUNTER — NON-APPOINTMENT (OUTPATIENT)
Age: 44
End: 2024-03-13

## 2024-03-13 PROCEDURE — 93294 REM INTERROG EVL PM/LDLS PM: CPT

## 2024-03-13 PROCEDURE — 93296 REM INTERROG EVL PM/IDS: CPT

## 2024-03-31 NOTE — PROGRESS NOTE ADULT - PROBLEM SELECTOR PLAN 1
Patient with palpitations, sinus tach on tele, CE x 1 negative,   Pulm consulted.   - TTE in admission/POCUS c/f LV dysfunction 2/2 RV dilation; suspect tachycardia may be in response from decreased LV output 2/2 RV  -Changed Lasix iv to po daily, monitor Is and Os. no chest pain

## 2024-04-14 ENCOUNTER — NON-APPOINTMENT (OUTPATIENT)
Age: 44
End: 2024-04-14

## 2024-04-14 ENCOUNTER — INPATIENT (INPATIENT)
Facility: HOSPITAL | Age: 44
LOS: 3 days | Discharge: HOME HEALTH | DRG: 603 | End: 2024-04-18
Attending: STUDENT IN AN ORGANIZED HEALTH CARE EDUCATION/TRAINING PROGRAM | Admitting: STUDENT IN AN ORGANIZED HEALTH CARE EDUCATION/TRAINING PROGRAM
Payer: MEDICAID

## 2024-04-14 VITALS
OXYGEN SATURATION: 97 % | DIASTOLIC BLOOD PRESSURE: 66 MMHG | SYSTOLIC BLOOD PRESSURE: 100 MMHG | TEMPERATURE: 99 F | RESPIRATION RATE: 17 BRPM | HEART RATE: 115 BPM

## 2024-04-14 DIAGNOSIS — Z95.0 PRESENCE OF CARDIAC PACEMAKER: Chronic | ICD-10-CM

## 2024-04-14 DIAGNOSIS — Z86.711 PERSONAL HISTORY OF PULMONARY EMBOLISM: ICD-10-CM

## 2024-04-14 DIAGNOSIS — Z29.9 ENCOUNTER FOR PROPHYLACTIC MEASURES, UNSPECIFIED: ICD-10-CM

## 2024-04-14 DIAGNOSIS — L02.211 CUTANEOUS ABSCESS OF ABDOMINAL WALL: ICD-10-CM

## 2024-04-14 DIAGNOSIS — I27.20 PULMONARY HYPERTENSION, UNSPECIFIED: ICD-10-CM

## 2024-04-14 DIAGNOSIS — I50.810 RIGHT HEART FAILURE, UNSPECIFIED: ICD-10-CM

## 2024-04-14 DIAGNOSIS — J45.909 UNSPECIFIED ASTHMA, UNCOMPLICATED: ICD-10-CM

## 2024-04-14 DIAGNOSIS — Z98.51 TUBAL LIGATION STATUS: Chronic | ICD-10-CM

## 2024-04-14 LAB
ALBUMIN SERPL ELPH-MCNC: 4.1 G/DL — SIGNIFICANT CHANGE UP (ref 3.3–5)
ALP SERPL-CCNC: 67 U/L — SIGNIFICANT CHANGE UP (ref 40–120)
ALT FLD-CCNC: 7 U/L — LOW (ref 10–45)
ANION GAP SERPL CALC-SCNC: 13 MMOL/L — SIGNIFICANT CHANGE UP (ref 5–17)
ANISOCYTOSIS BLD QL: SLIGHT — SIGNIFICANT CHANGE UP
APPEARANCE UR: ABNORMAL
APTT BLD: 38 SEC — HIGH (ref 24.5–35.6)
AST SERPL-CCNC: 6 U/L — LOW (ref 10–40)
BACTERIA # UR AUTO: ABNORMAL /HPF
BASE EXCESS BLDV CALC-SCNC: -1.7 MMOL/L — SIGNIFICANT CHANGE UP (ref -2–3)
BASOPHILS # BLD AUTO: 0 K/UL — SIGNIFICANT CHANGE UP (ref 0–0.2)
BASOPHILS NFR BLD AUTO: 0 % — SIGNIFICANT CHANGE UP (ref 0–2)
BILIRUB SERPL-MCNC: 0.7 MG/DL — SIGNIFICANT CHANGE UP (ref 0.2–1.2)
BILIRUB UR-MCNC: NEGATIVE — SIGNIFICANT CHANGE UP
BUN SERPL-MCNC: 8 MG/DL — SIGNIFICANT CHANGE UP (ref 7–23)
CA-I SERPL-SCNC: 1.15 MMOL/L — SIGNIFICANT CHANGE UP (ref 1.15–1.33)
CALCIUM SERPL-MCNC: 8.4 MG/DL — SIGNIFICANT CHANGE UP (ref 8.4–10.5)
CAST: 0 /LPF — SIGNIFICANT CHANGE UP (ref 0–4)
CHLORIDE BLDV-SCNC: 101 MMOL/L — SIGNIFICANT CHANGE UP (ref 96–108)
CHLORIDE SERPL-SCNC: 103 MMOL/L — SIGNIFICANT CHANGE UP (ref 96–108)
CO2 BLDV-SCNC: 24 MMOL/L — SIGNIFICANT CHANGE UP (ref 22–26)
CO2 SERPL-SCNC: 21 MMOL/L — LOW (ref 22–31)
COLOR SPEC: YELLOW — SIGNIFICANT CHANGE UP
CREAT SERPL-MCNC: 0.93 MG/DL — SIGNIFICANT CHANGE UP (ref 0.5–1.3)
DIFF PNL FLD: NEGATIVE — SIGNIFICANT CHANGE UP
EGFR: 78 ML/MIN/1.73M2 — SIGNIFICANT CHANGE UP
ELLIPTOCYTES BLD QL SMEAR: SLIGHT — SIGNIFICANT CHANGE UP
EOSINOPHIL # BLD AUTO: 0 K/UL — SIGNIFICANT CHANGE UP (ref 0–0.5)
EOSINOPHIL NFR BLD AUTO: 0 % — SIGNIFICANT CHANGE UP (ref 0–6)
GAS PNL BLDV: 133 MMOL/L — LOW (ref 136–145)
GAS PNL BLDV: SIGNIFICANT CHANGE UP
GAS PNL BLDV: SIGNIFICANT CHANGE UP
GLUCOSE BLDV-MCNC: 81 MG/DL — SIGNIFICANT CHANGE UP (ref 70–99)
GLUCOSE SERPL-MCNC: 81 MG/DL — SIGNIFICANT CHANGE UP (ref 70–99)
GLUCOSE UR QL: NEGATIVE MG/DL — SIGNIFICANT CHANGE UP
HCG SERPL-ACNC: <2 MIU/ML — SIGNIFICANT CHANGE UP
HCO3 BLDV-SCNC: 23 MMOL/L — SIGNIFICANT CHANGE UP (ref 22–29)
HCT VFR BLD CALC: 41.5 % — SIGNIFICANT CHANGE UP (ref 34.5–45)
HCT VFR BLDA CALC: 39 % — SIGNIFICANT CHANGE UP (ref 34.5–46.5)
HGB BLD CALC-MCNC: 13.1 G/DL — SIGNIFICANT CHANGE UP (ref 11.7–16.1)
HGB BLD-MCNC: 13 G/DL — SIGNIFICANT CHANGE UP (ref 11.5–15.5)
INR BLD: 1.21 RATIO — HIGH (ref 0.85–1.18)
KETONES UR-MCNC: NEGATIVE MG/DL — SIGNIFICANT CHANGE UP
LACTATE BLDV-MCNC: 1 MMOL/L — SIGNIFICANT CHANGE UP (ref 0.5–2)
LEUKOCYTE ESTERASE UR-ACNC: NEGATIVE — SIGNIFICANT CHANGE UP
LYMPHOCYTES # BLD AUTO: 1.85 K/UL — SIGNIFICANT CHANGE UP (ref 1–3.3)
LYMPHOCYTES # BLD AUTO: 13.9 % — SIGNIFICANT CHANGE UP (ref 13–44)
MANUAL SMEAR VERIFICATION: SIGNIFICANT CHANGE UP
MCHC RBC-ENTMCNC: 21.1 PG — LOW (ref 27–34)
MCHC RBC-ENTMCNC: 31.3 GM/DL — LOW (ref 32–36)
MCV RBC AUTO: 67.3 FL — LOW (ref 80–100)
MICROCYTES BLD QL: SLIGHT — SIGNIFICANT CHANGE UP
MONOCYTES # BLD AUTO: 0.59 K/UL — SIGNIFICANT CHANGE UP (ref 0–0.9)
MONOCYTES NFR BLD AUTO: 4.4 % — SIGNIFICANT CHANGE UP (ref 2–14)
NEUTROPHILS # BLD AUTO: 10.89 K/UL — HIGH (ref 1.8–7.4)
NEUTROPHILS NFR BLD AUTO: 81.7 % — HIGH (ref 43–77)
NITRITE UR-MCNC: NEGATIVE — SIGNIFICANT CHANGE UP
OVALOCYTES BLD QL SMEAR: SLIGHT — SIGNIFICANT CHANGE UP
PCO2 BLDV: 38 MMHG — LOW (ref 39–42)
PH BLDV: 7.39 — SIGNIFICANT CHANGE UP (ref 7.32–7.43)
PH UR: 6 — SIGNIFICANT CHANGE UP (ref 5–8)
PLAT MORPH BLD: NORMAL — SIGNIFICANT CHANGE UP
PLATELET # BLD AUTO: 370 K/UL — SIGNIFICANT CHANGE UP (ref 150–400)
PO2 BLDV: 33 MMHG — SIGNIFICANT CHANGE UP (ref 25–45)
POIKILOCYTOSIS BLD QL AUTO: SLIGHT — SIGNIFICANT CHANGE UP
POLYCHROMASIA BLD QL SMEAR: SLIGHT — SIGNIFICANT CHANGE UP
POTASSIUM BLDV-SCNC: 3.1 MMOL/L — LOW (ref 3.5–5.1)
POTASSIUM SERPL-MCNC: 3.2 MMOL/L — LOW (ref 3.5–5.3)
POTASSIUM SERPL-SCNC: 3.2 MMOL/L — LOW (ref 3.5–5.3)
PROT SERPL-MCNC: 7.5 G/DL — SIGNIFICANT CHANGE UP (ref 6–8.3)
PROT UR-MCNC: 30 MG/DL
PROTHROM AB SERPL-ACNC: 12.6 SEC — SIGNIFICANT CHANGE UP (ref 9.5–13)
RBC # BLD: 6.17 M/UL — HIGH (ref 3.8–5.2)
RBC # FLD: 19.6 % — HIGH (ref 10.3–14.5)
RBC BLD AUTO: ABNORMAL
RBC CASTS # UR COMP ASSIST: 13 /HPF — HIGH (ref 0–4)
REVIEW: SIGNIFICANT CHANGE UP
SAO2 % BLDV: 55.2 % — LOW (ref 67–88)
SODIUM SERPL-SCNC: 137 MMOL/L — SIGNIFICANT CHANGE UP (ref 135–145)
SP GR SPEC: >1.03 — HIGH (ref 1–1.03)
SQUAMOUS # UR AUTO: 6 /HPF — HIGH (ref 0–5)
UROBILINOGEN FLD QL: 1 MG/DL — SIGNIFICANT CHANGE UP (ref 0.2–1)
WBC # BLD: 13.33 K/UL — HIGH (ref 3.8–10.5)
WBC # FLD AUTO: 13.33 K/UL — HIGH (ref 3.8–10.5)
WBC UR QL: 3 /HPF — SIGNIFICANT CHANGE UP (ref 0–5)

## 2024-04-14 PROCEDURE — 74177 CT ABD & PELVIS W/CONTRAST: CPT | Mod: 26,MC

## 2024-04-14 PROCEDURE — 10061 I&D ABSCESS COMP/MULTIPLE: CPT

## 2024-04-14 PROCEDURE — 99285 EMERGENCY DEPT VISIT HI MDM: CPT | Mod: 25

## 2024-04-14 PROCEDURE — 99223 1ST HOSP IP/OBS HIGH 75: CPT | Mod: GC

## 2024-04-14 PROCEDURE — 76536 US EXAM OF HEAD AND NECK: CPT | Mod: 26

## 2024-04-14 PROCEDURE — 71045 X-RAY EXAM CHEST 1 VIEW: CPT | Mod: 26

## 2024-04-14 RX ORDER — PANTOPRAZOLE SODIUM 20 MG/1
40 TABLET, DELAYED RELEASE ORAL
Refills: 0 | Status: DISCONTINUED | OUTPATIENT
Start: 2024-04-14 | End: 2024-04-18

## 2024-04-14 RX ORDER — IPRATROPIUM BROMIDE 0.2 MG/ML
2.5 SOLUTION, NON-ORAL INHALATION
Qty: 0 | Refills: 0 | DISCHARGE

## 2024-04-14 RX ORDER — FUROSEMIDE 40 MG
1 TABLET ORAL
Refills: 0 | DISCHARGE

## 2024-04-14 RX ORDER — CEFAZOLIN SODIUM 1 G
2000 VIAL (EA) INJECTION ONCE
Refills: 0 | Status: COMPLETED | OUTPATIENT
Start: 2024-04-14 | End: 2024-04-14

## 2024-04-14 RX ORDER — SPIRONOLACTONE 25 MG/1
25 TABLET, FILM COATED ORAL DAILY
Refills: 0 | Status: DISCONTINUED | OUTPATIENT
Start: 2024-04-14 | End: 2024-04-16

## 2024-04-14 RX ORDER — IPRATROPIUM BROMIDE 0.2 MG/ML
500 SOLUTION, NON-ORAL INHALATION ONCE
Refills: 0 | Status: COMPLETED | OUTPATIENT
Start: 2024-04-14 | End: 2024-04-14

## 2024-04-14 RX ORDER — OMEPRAZOLE 10 MG/1
1 CAPSULE, DELAYED RELEASE ORAL
Qty: 0 | Refills: 0 | DISCHARGE

## 2024-04-14 RX ORDER — RIVAROXABAN 15 MG-20MG
20 KIT ORAL
Refills: 0 | Status: DISCONTINUED | OUTPATIENT
Start: 2024-04-14 | End: 2024-04-18

## 2024-04-14 RX ORDER — POTASSIUM CHLORIDE 20 MEQ
40 PACKET (EA) ORAL EVERY 4 HOURS
Refills: 0 | Status: COMPLETED | OUTPATIENT
Start: 2024-04-14 | End: 2024-04-15

## 2024-04-14 RX ORDER — RIOCIGUAT 1.5 MG/1
1 TABLET, FILM COATED ORAL
Qty: 0 | Refills: 0 | DISCHARGE

## 2024-04-14 RX ORDER — SPIRONOLACTONE 25 MG/1
1 TABLET, FILM COATED ORAL
Qty: 0 | Refills: 0 | DISCHARGE

## 2024-04-14 RX ORDER — FUROSEMIDE 40 MG
20 TABLET ORAL
Refills: 0 | Status: DISCONTINUED | OUTPATIENT
Start: 2024-04-15 | End: 2024-04-18

## 2024-04-14 RX ORDER — IPRATROPIUM BROMIDE 0.2 MG/ML
0 SOLUTION, NON-ORAL INHALATION
Qty: 0 | Refills: 0 | DISCHARGE

## 2024-04-14 RX ORDER — ACETAMINOPHEN 500 MG
1000 TABLET ORAL ONCE
Refills: 0 | Status: COMPLETED | OUTPATIENT
Start: 2024-04-14 | End: 2024-04-14

## 2024-04-14 RX ORDER — HYDROMORPHONE HYDROCHLORIDE 2 MG/ML
1 INJECTION INTRAMUSCULAR; INTRAVENOUS; SUBCUTANEOUS ONCE
Refills: 0 | Status: DISCONTINUED | OUTPATIENT
Start: 2024-04-14 | End: 2024-04-14

## 2024-04-14 RX ADMIN — Medication 500 MICROGRAM(S): at 20:22

## 2024-04-14 RX ADMIN — HYDROMORPHONE HYDROCHLORIDE 1 MILLIGRAM(S): 2 INJECTION INTRAMUSCULAR; INTRAVENOUS; SUBCUTANEOUS at 17:31

## 2024-04-14 RX ADMIN — Medication 100 MILLIGRAM(S): at 15:48

## 2024-04-14 RX ADMIN — Medication 100 MILLIGRAM(S): at 15:08

## 2024-04-14 NOTE — ED PROVIDER NOTE - PHYSICAL EXAMINATION
Gen:  NAD  HEENT: mucous membranes moist  CV: tachycardic, +S1/S2, no M/R/G, 2+ radial pulses b/l  Resp: CTAB, no W/R/R, no accessory muscle use, no increased work of breathing  GI: localized swelling and tenderness over left lower quadrant with surrounding erythema and drainage.   MSK: no LE edema  Neuro: following commands, moving all four extremities spontaneously  Psych: appropriate mood

## 2024-04-14 NOTE — H&P ADULT - NSHPLABSRESULTS_GEN_ALL_CORE
LABS:                          13.0   13.33 )-----------( 370      ( 14 Apr 2024 15:19 )             41.5     04-14    137  |  103  |  8   ----------------------------<  81  3.2<L>   |  21<L>  |  0.93    Ca    8.4      14 Apr 2024 15:19    TPro  7.5  /  Alb  4.1  /  TBili  0.7  /  DBili  x   /  AST  6<L>  /  ALT  7<L>  /  AlkPhos  67  04-14    PT/INR - ( 14 Apr 2024 15:20 )   PT: 12.6 sec;   INR: 1.21 ratio         PTT - ( 14 Apr 2024 15:20 )  PTT:38.0 sec

## 2024-04-14 NOTE — H&P ADULT - NSHPPHYSICALEXAM_GEN_ALL_CORE
VITALS:   T(C): 36.8 (04-14-24 @ 17:08), Max: 37.2 (04-14-24 @ 12:59)  HR: 110 (04-14-24 @ 17:08) (107 - 115)  BP: 108/75 (04-14-24 @ 17:08) (97/63 - 113/67)  RR: 21 (04-14-24 @ 17:08) (17 - 21)  SpO2: 96% (04-14-24 @ 17:08) (96% - 99%)    GENERAL: NAD, lying in bed comfortably  HEAD:  Atraumatic, normocephalic  EYES: EOMI, PERRLA, conjunctiva and sclera clear  ENT: Moist mucous membranes  NECK: Supple, no JVD  HEART: Regular rate and rhythm, no murmurs, rubs, or gallops  LUNGS: Unlabored respirations.  Clear to auscultation bilaterally, no crackles, wheezing, or rhonchi  ABDOMEN: Soft, nontender, nondistended, +BS  EXTREMITIES: 2+ peripheral pulses bilaterally. No clubbing, cyanosis, or edema  NERVOUS SYSTEM:  A&Ox3, no focal deficits   SKIN: No rashes or lesions VITALS:   T(C): 36.8 (04-14-24 @ 17:08), Max: 37.2 (04-14-24 @ 12:59)  HR: 110 (04-14-24 @ 17:08) (107 - 115)  BP: 108/75 (04-14-24 @ 17:08) (97/63 - 113/67)  RR: 21 (04-14-24 @ 17:08) (17 - 21)  SpO2: 96% (04-14-24 @ 17:08) (96% - 99%)    GENERAL: NAD, lying in bed comfortably, on 2 L NC  HEAD:  Atraumatic, normocephalic  EYES: EOMI, PERRLA, conjunctiva and sclera clear  ENT: Moist mucous membranes  NECK: Supple, no JVD  HEART: Tachycardic, regular rhythm, no murmurs, rubs, or gallops  LUNGS: Unlabored respirations.  Clear to auscultation bilaterally, no crackles, wheezing, or rhonchi  ABDOMEN: Soft, +mild tenderness to palpation, incision site bandage c/d/i, nondistended, +BS  EXTREMITIES: 2+ peripheral pulses bilaterally. No clubbing, cyanosis, or edema  NERVOUS SYSTEM:  A&Ox3, no focal deficits   SKIN: No rashes or lesions, skin tag on L leg

## 2024-04-14 NOTE — ED PROVIDER NOTE - NSICDXPASTMEDICALHX_GEN_ALL_CORE_FT
Spoke with pt. Reports last taking medication Friday. Last rx from PCP 10/18/17. Medication has been managed by Sarita Kuhn MD.  Pt reports clinic is no longer there. Pt requesting refill from Dr. Azra Jordan at this time. Discussed need for appt due to last since 2017. Pt agreed. Pt negative for covid screening at this time. Pt transferred to [facility] to schedule. PAST MEDICAL HISTORY:  Anemia     Asthma On home O2 nightly at 2L via NC    Asthma     CAD (coronary artery disease)     Corneal disorder     H/O pulmonary hypertension     History of tachycardia     On supplemental oxygen by nasal cannula O2 @ 2L    Pacemaker     PE (pulmonary thromboembolism) on Xarelto 10 mg daily    Pulmonary embolism     Pulmonary embolism     Pulmonary hypertension     Right heart failure     Sinusitis     Skin mass left upper thigh mass    Tachycardia

## 2024-04-14 NOTE — H&P ADULT - HISTORY OF PRESENT ILLNESS
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 The patient states she usually has her remodulin pump placed on RLQ and last week developed pain and swelling to that area, and changed her pump site to LLQ. Since then, she started to develop worsening abdominal pian and swelling to the RLQ and went to see her pulmonologist and PCP Dr. Yoon who recommended she present to the ED for evaluation of the abscess. The patient denies any fevers, chills, nausea, vomiting, worsening dyspnea on exertion.     Labs notable for WBC 13.33 w neutrophilic predominance, K 3.2, Lactate wnl, UA neg  CXR: Unchanged enlarged pulmonary arteries .L chest wall pacemaker, No focal consolidation.  CT A/P:  New 2.5 x 3.0 cm left anterior abdominal wall subcutaneous walled off collection, and smaller, approximate 1.2 cm right anterior abdominal wall subcutaneous collection is noted with associated skin thickening (also previously noted).     In the ED, her abscess was drained and cultures were sent and patient admitted for further management.  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 The patient states she usually has her remodulin pump placed on RLQ and last week developed pain and swelling to that area, and changed her pump site to LLQ. Since then, she started to develop worsening abdominal pian and swelling to the RLQ and went to see her pulmonologist and PCP Dr. Yoon who recommended she present to the ED for evaluation of the abscess. The patient denies any fevers, chills, nausea, vomiting, worsening dyspnea on exertion.     Afebrile, VSS. Labs notable for WBC 13.33 w neutrophilic predominance, K 3.2, Lactate wnl, UA neg  CXR: Unchanged enlarged pulmonary arteries .L chest wall pacemaker, No focal consolidation.  CT A/P:  New 2.5 x 3.0 cm left anterior abdominal wall subcutaneous walled off collection, and smaller, approximate 1.2 cm right anterior abdominal wall subcutaneous collection is noted with associated skin thickening (also previously noted).     In the ED, her abscess was drained and cultures were sent and patient admitted for further management, s/p 1 dose of cefazolin and doxy

## 2024-04-14 NOTE — ED PROVIDER NOTE - ATTENDING CONTRIBUTION TO CARE
Attending MD Zhong:  I have seen and examined this patient and fully participated in the care of this patient as the teaching attending. I personally made/approved the management plan and take responsibility for the patient management.      43-year-old woman with a history of pulmonary hypertension on baseline 2 L home oxygen, Remodulin pump presenting for evaluation of pain and swelling to the left lower quadrant of the abdomen where she previously had her Remodulin pump seated.  She states that the pump was recently there 2 weeks prior, she noted 1 week ago she developed redness and pain into the area.  She has been having chills over the last few days.  She is now having drainage from the abdominal wall.  She reports she has a history of abscesses of the abdominal wall requiring drainage.    Patient's vital signs are notable for heart rate 115 oral temperature 99 otherwise nonactionable.  Patient is saturating 97% on 2 L nasal cannula reading comfortably on room air.  Abdominal wall shows localized swelling and fluctuance to the left lower quadrant of the abdomen with fairly extensive surrounding erythema warmth and tenderness.  No crepitus.  Extremities warm and well-perfused.    Patient's presenting for evaluation of abdominal wall swelling and pain at the site of where she had recently had her Remodulin pump, also having chills and presents with tachycardia, clinical concern for abdominal wall abscess with secondary cellulitis and possible sepsis.  Plan for panculture empiric IV antibiotics, patient has history of MRSA from the abscess as well, would recommend admission given cardiac and pulmonary pulmonary hypertension history and presenting sepsis.      *The above represents an initial assessment/impression. Please refer to progress notes for potential changes in patient clinical course*

## 2024-04-14 NOTE — ED PROVIDER NOTE - OTHER FINDINGS
ECG recorded at 1418 independently interpreted by me , Dr Kye Zhong,  at 1430 shows sinus tachycardia rate 118 right axis deviation T wave inversions lead V1 to V3 T wave inversions inferiorly, compared to ECG 10/13/2023 no interval changes.

## 2024-04-14 NOTE — H&P ADULT - PROBLEM SELECTOR PLAN 2
Hx of  Pulm HTN 2/2 PAH (Group I and IV) on treopostinil (remodulin SQ with prior SQ site infection/abscesses) , complicated by chronic hypoxemic respiratory failure (2L NC) also on Xarelto for hx of RA thrombus  Follows with Dr. Yoon, on treprostinil SQ, sildenafil at home  Appears stable from pulmonary perspective    Plan:  -c/w Remodulin (treprostinil) 48 ng/kg/mg and home sildanefil 20 mg TID  - c/w home spironolactone   - Continue xarelto for hx of RA thrombus  -continue prednisone 10 mg daily  -continue home 2L NC  -avoid CCB or BB due to severe pulmonary HTN. Hx of  Pulm HTN 2/2 PAH (Group I and IV) on treopostinil (remodulin SQ with prior SQ site infection/abscesses) , complicated by chronic hypoxemic respiratory failure (2L NC) also on Xarelto for hx of RA thrombus  Follows with Dr. Yoon, on treprostinil SQ, sildenafil at home  Appears stable from pulmonary perspective    Plan:  -c/w Remodulin (treprostinil) 48 ng/kg/mg and home sildanefil 20 mg TID  - c/w home spironolactone   - Continue xarelto for hx of RA thrombus/PE  -continue prednisone 10 mg daily  -continue home 2L NC  -avoid CCB or BB due to severe pulmonary HTN. Hx of  Pulm HTN 2/2 PAH (Group I and IV) on treopostinil (remodulin SQ with prior SQ site infection/abscesses) , complicated by chronic hypoxemic respiratory failure (2L NC) also on Xarelto for hx of RA thrombus  Follows with Dr. Yoon, on treprostinil SQ, sildenafil at home  Appears stable from pulmonary perspective    Plan:  -c/w Remodulin (treprostinil) 48 ng/kg/mg and home sildanefil 20 mg TID  - c/w home spironolactone   - Continue xarelto for hx of RA thrombus/PE  -continue prednisone 10 mg daily  -continue home 2L NC  -avoid CCB or BB due to severe pulmonary HTN

## 2024-04-14 NOTE — H&P ADULT - ATTENDING COMMENTS
43F w/p HTN on Rimodulin pump , h/o PE on Xarelto, on O2 2L ,  RHF  h/o multiple abdominal wall abscesses (s/p I&D), asthma, s/p dual chamber PP, p/w   abdominal wall abscess in site of pump currently Afebrile, HD stable , labs significant for leukocytosis , WBC 13.33, CT A/P w/ 2.5 x 3.0 cm left and 1.2  cm R  abdominal wall collections: s/p I &D  in ED , s/p cefazolin and doxy, will continue to treat with doxycycline , can continue other home medications .

## 2024-04-14 NOTE — ED PROVIDER NOTE - OBJECTIVE STATEMENT
43-year-old female with past medical history of pulmonary hypertension with right heart failure on home O2, history of multiple abdominal wall abscesses, right atrial thrombus status post thrombectomy, asthma, anemia presenting to emergency department with swelling noted to the left side of abdomen increasing over the past week.  Previously had Remodulin pump placed in this area 2 weeks prior.  Patient has required abdominal wall abscess drainage for these before in the past.  Denies fever, headache, nausea, vomiting, chest pain, shortness of breath, dysuria, extremity edema.

## 2024-04-14 NOTE — ED PROVIDER NOTE - NSICDXPASTSURGICALHX_GEN_ALL_CORE_FT
PAST SURGICAL HISTORY:  H/O tubal ligation     History of pacemaker 12/19 - [employer] model [employer]gevity 4469    History of tubal ligation     Pacemaker

## 2024-04-14 NOTE — H&P ADULT - PROBLEM SELECTOR PLAN 1
Pt with history of prior abdominal wall abscesses iso Rimodulin pump, now with CT A/P showing new 2.5 x 3 cm collection s/p I&D in ED  Prior wound cx Jan 2023: MRSA staph lugdunensis    Plan:  -c/w vancomycin  -f/u wound cultures  -f/u blood cx Pt with history of prior abdominal wall abscesses iso Rimodulin pump, now with CT A/P showing new 2.5 x 3 cm collection s/p I&D in ED  Prior wound cx Jan 2023: MRSA staph lugdunensis    Plan:  -c/w vancomycin  -f/u MRSA swab  -f/u wound culture  -f/u blood cx, urine cx Afebrile, VSS. Pt with history of prior abdominal wall abscesses iso Rimodulin pump, now with a new abscess CT A/P showing new 2.5 x 3 cm collection in L anterior abdominal wall s/p I&D in ED  Prior wound cx Jan 2023: MRSA staph lugdunensis, sensitive in the past to cefazolin and doxy    Plan:  -c/w cefazolin and doxycycline  -f/u MRSA swab  -f/u wound culture  -f/u blood cx, urine cx  -consider ID consult in AM Afebrile, VSS. Pt with history of prior abdominal wall abscesses iso Rimodulin pump, now with a new abscess CT A/P showing new 2.5 x 3 cm collection in L anterior abdominal wall s/p I&D in ED  Prior wound cx Jan 2023: MRSA staph lugdunensis, sensitive in the past to cefazolin and doxy  s/p cefazolin and doxy in ED    Plan:  -c/w doxycycline  -f/u MRSA swab  -f/u abdominal abscess wound culture  -f/u blood cx, urine cx  -consider ID consult in AM Afebrile, VSS. Pt with history of prior abdominal wall abscesses iso Rimodulin pump, now with a new abscess CT A/P showing new 2.5 x 3 cm collection in L anterior abdominal wall s/p I&D in ED  Prior wound cx Jan 2023: MRSA staph lugdunensis, sensitive in the past to cefazolin and doxy  s/p cefazolin and doxy in ED    Plan:  -c/w doxycycline 100 mg BID  -f/u MRSA swab  -f/u abdominal abscess wound culture  -f/u blood cx, urine cx  -consider ID consult in AM

## 2024-04-14 NOTE — H&P ADULT - ASSESSMENT
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

## 2024-04-14 NOTE — H&P ADULT - NSHPSOCIALHISTORY_GEN_ALL_CORE
Patient denies any history of smoking, alcohol use, or illicit drug use. Patient denies any history of smoking, alcohol use, or illicit drug use. Not currently working, Can ambulate 1/2 block without become sob. Is independent in ADLs and iADLs.

## 2024-04-14 NOTE — ED PROVIDER NOTE - PROGRESS NOTE DETAILS
Attending MD Zhong: bedside POCUS with mixed echogenicity subcutaneous fluid collection measuring as deep as up to 3cm. Given depth of findings, will obtain CT a/p to further delineate and r/o peritoneal extension/proximity. Attending MD Zhong: My review of CT shows that the subcutaneous collection is fortunately nowhere near the peritoneum so it will be safe for bedside incision and drainage. Morales Velasquez MD, PGY2  Bedside I&D performed, see procedure note for details. No complications. Tolerated procedure well. Pt TBA to medicine

## 2024-04-14 NOTE — H&P ADULT - PROBLEM SELECTOR PLAN 5
continue on home O2 2L NC  on home ipratropium and levalbuterol and budesonide. continue on home O2 2L NC,  prednisone 10 mg daily  Not on any inhalers at home per med review    Plan:  ctm, consider as needed duonebs  -continue prednisone 10 mg daily continue on home O2 2L NC,  atrovent, prednisone 10 mg daily    Plan:  -continue atrovent Q6  -continue prednisone 10 mg daily

## 2024-04-14 NOTE — H&P ADULT - NSICDXPASTSURGICALHX_GEN_ALL_CORE_FT
PAST SURGICAL HISTORY:  H/O tubal ligation     History of pacemaker 12/19 - [employer] Scientific model Ingevity 4469    History of tubal ligation     Pacemaker

## 2024-04-14 NOTE — H&P ADULT - PROBLEM SELECTOR PLAN 3
Pt with hx of RA thrombus, underwent catheter directed partial thrombus aspiration 8/31/22, course complicated by L fem pseudoaneurysm for which she received thrombin injection on 9/3.     Plan:  - continue Xarelto 20 mg daily

## 2024-04-14 NOTE — H&P ADULT - NSHPREVIEWOFSYSTEMS_GEN_ALL_CORE

## 2024-04-14 NOTE — ED ADULT NURSE NOTE - OBJECTIVE STATEMENT
44 y/o Female presents to ED from home with c/o wound check. PMH of HF, pacemaker, pulmonary HTN on 2L NC baseline. Pt reports LLQ pain and drainage from abscess noted to LLQ. Endorsing chills. Denies SOB, CP, HA, N/V/D. Pt is A&Ox3, speaking full sentences without difficulty. Airway patent with spontaneous unlabored breathing, skin is warm, drainage from abs wall in LLQ. Abd soft, TTP in LLQ. Pt placed on continuous cardiac monitor. US IV inserted @1500.  Safety maintained bed is in the lowest position, locked and call bell in reach.

## 2024-04-14 NOTE — H&P ADULT - PROBLEM SELECTOR PLAN 4
Right heart failure with normal LVEF in setting of PAH, appears euvolemic this admission, on home O2 2L requirement  TTE 8/23:  1. RV volume and pressure overload. Normal LV systolic fx w EF 55 to 60 %.   2. Mildly enlarged right ventricular cavity size and reduced systolic right ventricular function.   3. Device lead is visualized in the right heart.  RHC 7/23/20 RA 4, RV 80/4, PA 80/40/57, PCWP 22, LVEDP 4; CO/CI 4/2.0; PVR 11 (using EDP 13).     Plan:  - Continue spironolactone 25 mg daily  - Replete electrolytes to K>4.0 and Mg >2.5 Right heart failure with normal LVEF in setting of PAH, appears euvolemic this admission, on home O2 2L requirement  TTE 8/23:  1. RV volume and pressure overload. Normal LV systolic fx w EF 55 to 60 %.   2. Mildly enlarged right ventricular cavity size and reduced systolic right ventricular function. 3. Device lead is visualized in the right heart.  RHC 7/23/20 RA 4, RV 80/4, PA 80/40/57, PCWP 22, LVEDP 4; CO/CI 4/2.0; PVR 11 (using EDP 13).   EKG this admission: Sinus tachycardia, R axis deviation, RVH,     Plan:  - Continue spironolactone 25 mg daily  -c/w home Lasix 20 mg MWF  - Replete electrolytes to K>4.0 and Mg >2.5

## 2024-04-14 NOTE — ED PROCEDURE NOTE - PROCEDURE ADDITIONAL DETAILS
POCUS: Emergency Department Focused Ultrasound performed at patient's bedside.  The complete report will be available in PACS.     Mixed echogenicity subcutaneous fluid collection seen in area of left abdominal wall consistent with abscess

## 2024-04-14 NOTE — PATIENT PROFILE ADULT - FALL HARM RISK - HARM RISK INTERVENTIONS

## 2024-04-15 LAB
ALBUMIN SERPL ELPH-MCNC: 3.9 G/DL — SIGNIFICANT CHANGE UP (ref 3.3–5)
ALP SERPL-CCNC: 62 U/L — SIGNIFICANT CHANGE UP (ref 40–120)
ALT FLD-CCNC: 6 U/L — LOW (ref 10–45)
ANION GAP SERPL CALC-SCNC: 13 MMOL/L — SIGNIFICANT CHANGE UP (ref 5–17)
AST SERPL-CCNC: 10 U/L — SIGNIFICANT CHANGE UP (ref 10–40)
BASOPHILS # BLD AUTO: 0.04 K/UL — SIGNIFICANT CHANGE UP (ref 0–0.2)
BASOPHILS NFR BLD AUTO: 0.4 % — SIGNIFICANT CHANGE UP (ref 0–2)
BILIRUB SERPL-MCNC: 0.9 MG/DL — SIGNIFICANT CHANGE UP (ref 0.2–1.2)
BUN SERPL-MCNC: 8 MG/DL — SIGNIFICANT CHANGE UP (ref 7–23)
CALCIUM SERPL-MCNC: 8.8 MG/DL — SIGNIFICANT CHANGE UP (ref 8.4–10.5)
CHLORIDE SERPL-SCNC: 104 MMOL/L — SIGNIFICANT CHANGE UP (ref 96–108)
CO2 SERPL-SCNC: 19 MMOL/L — LOW (ref 22–31)
CREAT SERPL-MCNC: 0.94 MG/DL — SIGNIFICANT CHANGE UP (ref 0.5–1.3)
CULTURE RESULTS: SIGNIFICANT CHANGE UP
EGFR: 77 ML/MIN/1.73M2 — SIGNIFICANT CHANGE UP
EOSINOPHIL # BLD AUTO: 0.04 K/UL — SIGNIFICANT CHANGE UP (ref 0–0.5)
EOSINOPHIL NFR BLD AUTO: 0.4 % — SIGNIFICANT CHANGE UP (ref 0–6)
GLUCOSE SERPL-MCNC: 123 MG/DL — HIGH (ref 70–99)
GRAM STN FLD: ABNORMAL
HCT VFR BLD CALC: 40.4 % — SIGNIFICANT CHANGE UP (ref 34.5–45)
HGB BLD-MCNC: 12.7 G/DL — SIGNIFICANT CHANGE UP (ref 11.5–15.5)
IMM GRANULOCYTES NFR BLD AUTO: 0.4 % — SIGNIFICANT CHANGE UP (ref 0–0.9)
LYMPHOCYTES # BLD AUTO: 0.75 K/UL — LOW (ref 1–3.3)
LYMPHOCYTES # BLD AUTO: 6.7 % — LOW (ref 13–44)
MAGNESIUM SERPL-MCNC: 1.9 MG/DL — SIGNIFICANT CHANGE UP (ref 1.6–2.6)
MCHC RBC-ENTMCNC: 21 PG — LOW (ref 27–34)
MCHC RBC-ENTMCNC: 31.4 GM/DL — LOW (ref 32–36)
MCV RBC AUTO: 66.9 FL — LOW (ref 80–100)
MONOCYTES # BLD AUTO: 0.18 K/UL — SIGNIFICANT CHANGE UP (ref 0–0.9)
MONOCYTES NFR BLD AUTO: 1.6 % — LOW (ref 2–14)
MRSA PCR RESULT.: DETECTED
NEUTROPHILS # BLD AUTO: 10.19 K/UL — HIGH (ref 1.8–7.4)
NEUTROPHILS NFR BLD AUTO: 90.5 % — HIGH (ref 43–77)
NRBC # BLD: 0 /100 WBCS — SIGNIFICANT CHANGE UP (ref 0–0)
PHOSPHATE SERPL-MCNC: 2.1 MG/DL — LOW (ref 2.5–4.5)
PLATELET # BLD AUTO: 353 K/UL — SIGNIFICANT CHANGE UP (ref 150–400)
POTASSIUM SERPL-MCNC: 4.4 MMOL/L — SIGNIFICANT CHANGE UP (ref 3.5–5.3)
POTASSIUM SERPL-SCNC: 4.4 MMOL/L — SIGNIFICANT CHANGE UP (ref 3.5–5.3)
PROT SERPL-MCNC: 7.4 G/DL — SIGNIFICANT CHANGE UP (ref 6–8.3)
RBC # BLD: 6.04 M/UL — HIGH (ref 3.8–5.2)
RBC # FLD: 19.4 % — HIGH (ref 10.3–14.5)
S AUREUS DNA NOSE QL NAA+PROBE: DETECTED
SODIUM SERPL-SCNC: 136 MMOL/L — SIGNIFICANT CHANGE UP (ref 135–145)
SPECIMEN SOURCE: SIGNIFICANT CHANGE UP
SPECIMEN SOURCE: SIGNIFICANT CHANGE UP
WBC # BLD: 11.24 K/UL — HIGH (ref 3.8–10.5)
WBC # FLD AUTO: 11.24 K/UL — HIGH (ref 3.8–10.5)

## 2024-04-15 PROCEDURE — 99232 SBSQ HOSP IP/OBS MODERATE 35: CPT | Mod: GC

## 2024-04-15 PROCEDURE — 99222 1ST HOSP IP/OBS MODERATE 55: CPT | Mod: GC

## 2024-04-15 RX ORDER — TREPROSTINIL 48 UG/1
1 INHALANT ORAL
Refills: 0 | DISCHARGE

## 2024-04-15 RX ORDER — DIPHENHYDRAMINE HCL 50 MG
25 CAPSULE ORAL ONCE
Refills: 0 | Status: COMPLETED | OUTPATIENT
Start: 2024-04-15 | End: 2024-04-15

## 2024-04-15 RX ORDER — SODIUM,POTASSIUM PHOSPHATES 278-250MG
1 POWDER IN PACKET (EA) ORAL ONCE
Refills: 0 | Status: COMPLETED | OUTPATIENT
Start: 2024-04-15 | End: 2024-04-15

## 2024-04-15 RX ORDER — IPRATROPIUM BROMIDE 0.2 MG/ML
500 SOLUTION, NON-ORAL INHALATION EVERY 6 HOURS
Refills: 0 | Status: DISCONTINUED | OUTPATIENT
Start: 2024-04-15 | End: 2024-04-18

## 2024-04-15 RX ORDER — MUPIROCIN 20 MG/G
1 OINTMENT TOPICAL
Refills: 0 | Status: DISCONTINUED | OUTPATIENT
Start: 2024-04-15 | End: 2024-04-18

## 2024-04-15 RX ORDER — IPRATROPIUM/ALBUTEROL SULFATE 18-103MCG
3 AEROSOL WITH ADAPTER (GRAM) INHALATION EVERY 6 HOURS
Refills: 0 | Status: DISCONTINUED | OUTPATIENT
Start: 2024-04-15 | End: 2024-04-15

## 2024-04-15 RX ORDER — IPRATROPIUM BROMIDE 0.2 MG/ML
1 SOLUTION, NON-ORAL INHALATION
Refills: 0 | DISCHARGE

## 2024-04-15 RX ORDER — CHLORHEXIDINE GLUCONATE 213 G/1000ML
1 SOLUTION TOPICAL DAILY
Refills: 0 | Status: DISCONTINUED | OUTPATIENT
Start: 2024-04-15 | End: 2024-04-18

## 2024-04-15 RX ORDER — IPRATROPIUM BROMIDE 0.2 MG/ML
1 SOLUTION, NON-ORAL INHALATION EVERY 6 HOURS
Refills: 0 | Status: DISCONTINUED | OUTPATIENT
Start: 2024-04-15 | End: 2024-04-15

## 2024-04-15 RX ADMIN — Medication 1 TABLET(S): at 12:25

## 2024-04-15 RX ADMIN — Medication 40 MILLIEQUIVALENT(S): at 02:19

## 2024-04-15 RX ADMIN — Medication 100 MILLIGRAM(S): at 17:12

## 2024-04-15 RX ADMIN — Medication 40 MILLIEQUIVALENT(S): at 06:07

## 2024-04-15 RX ADMIN — Medication 20 MILLIGRAM(S): at 12:26

## 2024-04-15 RX ADMIN — RIVAROXABAN 20 MILLIGRAM(S): KIT at 17:12

## 2024-04-15 RX ADMIN — Medication 25 MILLIGRAM(S): at 08:49

## 2024-04-15 RX ADMIN — Medication 10 MILLIGRAM(S): at 06:08

## 2024-04-15 RX ADMIN — Medication 500 MICROGRAM(S): at 17:12

## 2024-04-15 RX ADMIN — Medication 500 MICROGRAM(S): at 12:25

## 2024-04-15 RX ADMIN — PANTOPRAZOLE SODIUM 40 MILLIGRAM(S): 20 TABLET, DELAYED RELEASE ORAL at 06:18

## 2024-04-15 RX ADMIN — Medication 500 MICROGRAM(S): at 02:35

## 2024-04-15 RX ADMIN — Medication 20 MILLIGRAM(S): at 21:06

## 2024-04-15 RX ADMIN — MUPIROCIN 1 APPLICATION(S): 20 OINTMENT TOPICAL at 17:12

## 2024-04-15 RX ADMIN — CHLORHEXIDINE GLUCONATE 1 APPLICATION(S): 213 SOLUTION TOPICAL at 12:32

## 2024-04-15 RX ADMIN — SPIRONOLACTONE 25 MILLIGRAM(S): 25 TABLET, FILM COATED ORAL at 15:09

## 2024-04-15 RX ADMIN — Medication 100 MILLIGRAM(S): at 06:06

## 2024-04-15 RX ADMIN — Medication 500 MICROGRAM(S): at 06:08

## 2024-04-15 NOTE — PROGRESS NOTE ADULT - PROBLEM SELECTOR PLAN 4
Right heart failure with normal LVEF in setting of PAH, appears euvolemic this admission, on home O2 2L requirement  TTE 8/23:  1. RV volume and pressure overload. Normal LV systolic fx w EF 55 to 60 %.   2. Mildly enlarged right ventricular cavity size and reduced systolic right ventricular function. 3. Device lead is visualized in the right heart.  RHC 7/23/20 RA 4, RV 80/4, PA 80/40/57, PCWP 22, LVEDP 4; CO/CI 4/2.0; PVR 11 (using EDP 13).   EKG this admission: Sinus tachycardia, R axis deviation, RVH,     Plan:  - Continue spironolactone 25 mg daily  -c/w home Lasix 20 mg MWF  - Replete electrolytes to K>4.0 and Mg >2.5

## 2024-04-15 NOTE — CONSULT NOTE ADULT - ATTENDING COMMENTS
Agree with above  43F PMH pulmonary hypertension (Group I and IV on Remodulin / Xarelto / home O2 2LNC), H/O PE, multiple abdominal wall abscesses admitted for I&D of abdominal abscess.  - c/w Remodulin via pump  - oxygen as needed to maintain spO2 >94%  - c/w Xarelto  - Now s/p I&D abscess; check ID/SS of collection  - Patient has H/O pencillin allergy, but patient does not know that allergy was. Also had itching / redness and rash with vancomycin twice.  - Hemodynamically stable, c/w sildenafil 20mg TID.  - No respiratory complaints at present.  - We will continue to follow the patient with you.

## 2024-04-15 NOTE — PROGRESS NOTE ADULT - ATTENDING COMMENTS
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   Endorsing come pain at LLQ, noted with some induration and erythema around the site    - c/w doxycycline for now based on prior culture data   - pending Bcx, abscess cx and MRSA swab   - c/w Rimodulin for now, pump site RLQ   - on home O2 2LNC, no c/o SOB, no e/o overload  - Continue xarelto for hx of RA thrombus/PE  - c/w home lasix, aldactone for RHF

## 2024-04-15 NOTE — PROVIDER CONTACT NOTE (OTHER) - RECOMMENDATIONS
As per provider John, cardiac order is in place, however immediate monitoring of the patient is not necessary at the moment. Awaiting further directive.

## 2024-04-15 NOTE — CONSULT NOTE ADULT - ASSESSMENT
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 Pulm c/f pHTN and remodulin pump assistance.    #pHTN (groups 1+4) on 2L home O2 and s/p remodulin pump  #abd wall abscesses  #DVT/PE hx    - Pt currently at baseline without respiratory complaints  - d/w her outpt pulm Dr Yoon; no changes to regimen; please continue home sildenafil 20mg TID  - will d/w pharmacy to continue her home remodulin pump; she will need to sign a form authorizing use of her own pump  - c/w Xarelto, home lasix, chronic pred  - if pt ever off of steroids in future, would recd Allergy eval to evaluate penicillin allergy; denies ever having a rxn to it (instead has allergy to vancomycin - itchiness and hives)  - c/w  and recd Tele monitoring while on remodulin    Tesfaye Braun MD  Fellow, Pulmonary-Critical Care

## 2024-04-15 NOTE — PROGRESS NOTE ADULT - PROBLEM SELECTOR PLAN 1
Afebrile, VSS. Pt with history of prior abdominal wall abscesses iso Rimodulin pump, now with a new abscess CT A/P showing new 2.5 x 3 cm collection in L anterior abdominal wall s/p I&D in ED  Prior wound cx Jan 2023: MRSA staph lugdunensis, sensitive in the past to cefazolin and doxy  s/p cefazolin and doxy in ED    Plan:  -c/w doxycycline 100 mg BID  -f/u MRSA swab  -f/u abdominal abscess wound culture  -f/u blood cx, urine cx  -consider ID consult in AM Afebrile, VSS. Pt with history of prior abdominal wall abscesses iso Rimodulin pump, now with a new abscess CT A/P showing new 2.5 x 3 cm collection in L anterior abdominal wall s/p I&D in ED  Prior wound cx Jan 2023: MRSA staph lugdunensis, sensitive in the past to cefazolin and doxy  s/p cefazolin and doxy in ED    Plan:  -c/w doxycycline 100 mg BID  -f/u MRSA swab  -f/u abdominal abscess wound culture  -f/u blood cx, urine cx

## 2024-04-15 NOTE — CONSULT NOTE ADULT - SUBJECTIVE AND OBJECTIVE BOX
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 The patient states she usually has her remodulin pump placed on RLQ and last week developed pain and swelling to that area, and changed her pump site to LLQ. Since then, she started to develop worsening abdominal pian and swelling to the RLQ and went to see her pulmonologist and PCP Dr. Yoon who recommended she present to the ED for evaluation of the abscess. The patient denies any fevers, chills, nausea, vomiting, worsening dyspnea on exertion.     Today, feels well and without respiratory complaints. States her pump has caused prior abscesses. Currently on RA and HDS.    Imaging:  CXR: Unchanged enlarged pulmonary arteries .L chest wall pacemaker, No focal consolidation.  CT A/P:  New 2.5 x 3.0 cm left anterior abdominal wall subcutaneous walled off collection, and smaller, approximate 1.2 cm right anterior abdominal wall subcutaneous collection is noted with associated skin thickening (also previously noted).     PAST MEDICAL & SURGICAL HISTORY:  Tachycardia      Anemia      Pulmonary hypertension      Pulmonary embolism      Asthma  On home O2 nightly at 2L via NC      PE (pulmonary thromboembolism)  on Xarelto 10 mg daily      Sinusitis      Corneal disorder      Right heart failure      CAD (coronary artery disease)      H/O pulmonary hypertension      Pulmonary embolism      Asthma      History of tachycardia      On supplemental oxygen by nasal cannula  O2 @ 2L      Pacemaker      Skin mass  left upper thigh mass      History of tubal ligation      Pacemaker      H/O tubal ligation      History of pacemaker  12/19 - [employer] model Ingevity 4469          FAMILY HISTORY:  Family history of diabetes mellitus  in brother    Family history of diabetes mellitus in mother    FH: anemia        SOCIAL HISTORY:  Smoking:x  Substance Use:x  EtOH Use:x      Allergies    penicillin (Rash)  adhesives (Rash)  vancomycin (Rash)    Intolerances    albuterol (Other; Rash)      HOME MEDICATIONS:    REVIEW OF SYSTEMS:  Constitutional: [x] negative [ ] fevers [ ] chills [ ] weight loss [ ] weight gain  HEENT: [x] negative [ ] dry eyes [ ] eye irritation [ ] postnasal drip [ ] nasal congestion  CV: [x] negative  [ ] chest pain [ ] orthopnea [ ] palpitations [ ] murmur  Resp: [x] negative [ ] cough [ ] shortness of breath [ ] dyspnea [ ] wheezing [ ] sputum [ ] hemoptysis  GI: [x] negative [ ] nausea [ ] vomiting [ ] diarrhea [ ] constipation [ ] abd pain [ ] dysphagia   : [x] negative [ ] dysuria [ ] nocturia [ ] hematuria [ ] increased urinary frequency  Musculoskeletal: [x] negative [ ] back pain [ ] myalgias [ ] arthralgias [ ] fracture  Skin: [x] negative [ ] rash [ ] itch  Neurological: [x] negative [ ] headache [ ] dizziness [ ] syncope [ ] weakness [ ] numbness  Psychiatric: [x] negative [ ] anxiety [ ] depression  Endocrine: [x] negative [ ] diabetes [ ] thyroid problem  Hematologic/Lymphatic: [x] negative [ ] anemia [ ] bleeding problem  Allergic/Immunologic: [x] negative [ ] itchy eyes [ ] nasal discharge [ ] hives [ ] angioedema  [x] All other systems negative  [ ] Unable to assess ROS because ________    OBJECTIVE:  ICU Vital Signs Last 24 Hrs  T(C): 37.1 (15 Apr 2024 05:19), Max: 37.1 (15 Apr 2024 05:19)  T(F): 98.7 (15 Apr 2024 05:19), Max: 98.7 (15 Apr 2024 05:19)  HR: 114 (15 Apr 2024 05:19) (105 - 114)  BP: 99/55 (15 Apr 2024 05:19) (97/65 - 99/55)  BP(mean): 74 (14 Apr 2024 20:30) (74 - 74)  ABP: --  ABP(mean): --  RR: 18 (15 Apr 2024 05:19) (18 - 20)  SpO2: 94% (15 Apr 2024 05:19) (92% - 98%)    O2 Parameters below as of 15 Apr 2024 05:19  Patient On (Oxygen Delivery Method): nasal cannula  O2 Flow (L/min): 2            CAPILLARY BLOOD GLUCOSE          PHYSICAL EXAM:  General: nad  HEENT: MMM, PERRLA  Respiratory: ctab, on RA  Cardiovascular: RRR, no MRG  Abdomen: mild tenderness in lower quadrants bilat  Extremities: no LE edema  Neurological: AOx3, no gross deficits    HOSPITAL MEDICATIONS:  rivaroxaban 20 milliGRAM(s) Oral with dinner    doxycycline IVPB 100 milliGRAM(s) IV Intermittent every 12 hours    furosemide    Tablet 20 milliGRAM(s) Oral <User Schedule>  sildenafil (REVATIO) 20 milliGRAM(s) Oral every 8 hours  spironolactone 25 milliGRAM(s) Oral daily    predniSONE   Tablet 10 milliGRAM(s) Oral daily    ipratropium    for Nebulization 500 MICROGram(s) Nebulizer every 6 hours      pantoprazole    Tablet 40 milliGRAM(s) Oral before breakfast            chlorhexidine 2% Cloths 1 Application(s) Topical daily  mupirocin 2% Nasal 1 Application(s) Both Nostrils two times a day        LABS:                        12.7   11.24 )-----------( 353      ( 15 Apr 2024 10:56 )             40.4     Hgb Trend: 12.7<--, 13.0<--  04-15    136  |  104  |  8   ----------------------------<  123<H>  4.4   |  19<L>  |  0.94    Ca    8.8      15 Apr 2024 10:56  Phos  2.1     04-15  Mg     1.9     04-15    TPro  7.4  /  Alb  3.9  /  TBili  0.9  /  DBili  x   /  AST  10  /  ALT  6<L>  /  AlkPhos  62  04-15    Creatinine Trend: 0.94<--, 0.93<--  PT/INR - ( 14 Apr 2024 15:20 )   PT: 12.6 sec;   INR: 1.21 ratio         PTT - ( 14 Apr 2024 15:20 )  PTT:38.0 sec  Urinalysis Basic - ( 15 Apr 2024 10:56 )    Color: x / Appearance: x / SG: x / pH: x  Gluc: 123 mg/dL / Ketone: x  / Bili: x / Urobili: x   Blood: x / Protein: x / Nitrite: x   Leuk Esterase: x / RBC: x / WBC x   Sq Epi: x / Non Sq Epi: x / Bacteria: x        Venous Blood Gas:  04-14 @ 15:09  7.39/38/33/23/55.2  VBG Lactate: 1.0      MICROBIOLOGY:     RADIOLOGY:  [x] Reviewed and interpreted by me    PULMONARY FUNCTION TESTS:    EKG:

## 2024-04-15 NOTE — PROGRESS NOTE ADULT - SUBJECTIVE AND OBJECTIVE BOX
PROGRESS NOTE:   Authored by Dr. Fani Lopez MD (PGY-1). Pager [facility] [phone] / [place] ( [id number]) or via TEAMS    Patient is a 43y old  Female who presents with a chief complaint of Abdominal wall abscess (14 Apr 2024 19:59)      SUBJECTIVE / OVERNIGHT EVENTS:  No acute events overnight.     ADDITIONAL REVIEW OF SYSTEMS:  Patient denies fevers, chills, chest pain, shortness of breath, nausea, abdominal pain, diarrhea, constipation, dysuria, leg swelling, headache, light headedness.    MEDICATIONS  (STANDING):  doxycycline IVPB 100 milliGRAM(s) IV Intermittent every 12 hours  furosemide    Tablet 20 milliGRAM(s) Oral <User Schedule>  ipratropium    for Nebulization 500 MICROGram(s) Nebulizer every 6 hours  pantoprazole    Tablet 40 milliGRAM(s) Oral before breakfast  predniSONE   Tablet 10 milliGRAM(s) Oral daily  rivaroxaban 20 milliGRAM(s) Oral with dinner  sildenafil (REVATIO) 20 milliGRAM(s) Oral every 8 hours  spironolactone 25 milliGRAM(s) Oral daily    MEDICATIONS  (PRN):      CAPILLARY BLOOD GLUCOSE        I&O's Summary      PHYSICAL EXAM:  Vital Signs Last 24 Hrs  T(C): 37.1 (15 Apr 2024 05:19), Max: 37.2 (14 Apr 2024 12:59)  T(F): 98.7 (15 Apr 2024 05:19), Max: 99 (14 Apr 2024 12:59)  HR: 114 (15 Apr 2024 05:19) (105 - 115)  BP: 99/55 (15 Apr 2024 05:19) (97/63 - 113/67)  BP(mean): 74 (14 Apr 2024 20:30) (68 - 87)  RR: 18 (15 Apr 2024 05:19) (17 - 21)  SpO2: 94% (15 Apr 2024 05:19) (92% - 99%)    Parameters below as of 15 Apr 2024 05:19  Patient On (Oxygen Delivery Method): nasal cannula  O2 Flow (L/min): 2      CONSTITUTIONAL: NAD, well-developed  RESPIRATORY: Normal respiratory effort; lungs are clear to auscultation bilaterally  CARDIOVASCULAR: Regular rate and rhythm, normal S1 and S2, no murmur/rub/gallop; No lower extremity edema; Peripheral pulses are 2+ bilaterally  ABDOMEN: Nontender to palpation, normoactive bowel sounds, no rebound/guarding; No hepatosplenomegaly  MSK: no clubbing or cyanosis of digits; no joint swelling or tenderness to palpation  PSYCH: A+O to person, place, and time; affect appropriate    LABS:                        13.0   13.33 )-----------( 370      ( 14 Apr 2024 15:19 )             41.5     04-14    137  |  103  |  8   ----------------------------<  81  3.2<L>   |  21<L>  |  0.93    Ca    8.4      14 Apr 2024 15:19    TPro  7.5  /  Alb  4.1  /  TBili  0.7  /  DBili  x   /  AST  6<L>  /  ALT  7<L>  /  AlkPhos  67  04-14    PT/INR - ( 14 Apr 2024 15:20 )   PT: 12.6 sec;   INR: 1.21 ratio         PTT - ( 14 Apr 2024 15:20 )  PTT:38.0 sec      Urinalysis Basic - ( 14 Apr 2024 15:19 )    Color: x / Appearance: x / SG: x / pH: x  Gluc: 81 mg/dL / Ketone: x  / Bili: x / Urobili: x   Blood: x / Protein: x / Nitrite: x   Leuk Esterase: x / RBC: x / WBC x   Sq Epi: x / Non Sq Epi: x / Bacteria: x        Culture - Abscess with Gram Stain (collected 14 Apr 2024 19:52)  Source: .Abscess left abdominal wall  Gram Stain (15 Apr 2024 02:28):    Few polymorphonuclear leukocytes seen per low power field    Few Gram positive cocci in pairs seen per oil power field        Tele Reviewed:    RADIOLOGY & ADDITIONAL TESTS:  Results Reviewed:   Imaging Personally Reviewed:  Electrocardiogram Personally Reviewed:     PROGRESS NOTE:   Authored by Dr. Fani Lopez MD (PGY-1). Pager [facility] [phone] / [place] ( [id number]) or via TEAMS    Patient is a 43y old  Female who presents with a chief complaint of Abdominal wall abscess (14 Apr 2024 19:59)      SUBJECTIVE / OVERNIGHT EVENTS:  No acute events overnight. Pt c/o itchiness to chest where EKG leads were placed, requesting benadryl.  LLQ pump site s/p I&D now bandaged with packing in place.       MEDICATIONS  (STANDING):  doxycycline IVPB 100 milliGRAM(s) IV Intermittent every 12 hours  furosemide    Tablet 20 milliGRAM(s) Oral <User Schedule>  ipratropium    for Nebulization 500 MICROGram(s) Nebulizer every 6 hours  pantoprazole    Tablet 40 milliGRAM(s) Oral before breakfast  predniSONE   Tablet 10 milliGRAM(s) Oral daily  rivaroxaban 20 milliGRAM(s) Oral with dinner  sildenafil (REVATIO) 20 milliGRAM(s) Oral every 8 hours  spironolactone 25 milliGRAM(s) Oral daily    MEDICATIONS  (PRN):      CAPILLARY BLOOD GLUCOSE        I&O's Summary      PHYSICAL EXAM:  Vital Signs Last 24 Hrs  T(C): 37.1 (15 Apr 2024 05:19), Max: 37.2 (14 Apr 2024 12:59)  T(F): 98.7 (15 Apr 2024 05:19), Max: 99 (14 Apr 2024 12:59)  HR: 114 (15 Apr 2024 05:19) (105 - 115)  BP: 99/55 (15 Apr 2024 05:19) (97/63 - 113/67)  BP(mean): 74 (14 Apr 2024 20:30) (68 - 87)  RR: 18 (15 Apr 2024 05:19) (17 - 21)  SpO2: 94% (15 Apr 2024 05:19) (92% - 99%)    Parameters below as of 15 Apr 2024 05:19  Patient On (Oxygen Delivery Method): nasal cannula  O2 Flow (L/min): 2      CONSTITUTIONAL: NAD, well-developed  RESPIRATORY: Normal respiratory effort; lungs are clear to auscultation bilaterally  CARDIOVASCULAR: Regular rate and rhythm, normal S1 and S2, no murmur/rub/gallop; No lower extremity edema; Peripheral pulses are 2+ bilaterally  ABDOMEN: LLQ warm indurated, packing in place and bandages in place. RLQ with pump in place,. Nontender to palpation, normoactive bowel sounds, no rebound/guarding; No hepatosplenomegaly  MSK: no clubbing or cyanosis of digits; no joint swelling or tenderness to palpation  PSYCH: A+O to person, place, and time; affect appropriate    LABS:                        13.0   13.33 )-----------( 370      ( 14 Apr 2024 15:19 )             41.5     04-14    137  |  103  |  8   ----------------------------<  81  3.2<L>   |  21<L>  |  0.93    Ca    8.4      14 Apr 2024 15:19    TPro  7.5  /  Alb  4.1  /  TBili  0.7  /  DBili  x   /  AST  6<L>  /  ALT  7<L>  /  AlkPhos  67  04-14    PT/INR - ( 14 Apr 2024 15:20 )   PT: 12.6 sec;   INR: 1.21 ratio         PTT - ( 14 Apr 2024 15:20 )  PTT:38.0 sec      Urinalysis Basic - ( 14 Apr 2024 15:19 )    Color: x / Appearance: x / SG: x / pH: x  Gluc: 81 mg/dL / Ketone: x  / Bili: x / Urobili: x   Blood: x / Protein: x / Nitrite: x   Leuk Esterase: x / RBC: x / WBC x   Sq Epi: x / Non Sq Epi: x / Bacteria: x        Culture - Abscess with Gram Stain (collected 14 Apr 2024 19:52)  Source: .Abscess left abdominal wall  Gram Stain (15 Apr 2024 02:28):    Few polymorphonuclear leukocytes seen per low power field    Few Gram positive cocci in pairs seen per oil power field        Tele Reviewed:    RADIOLOGY & ADDITIONAL TESTS:  Results Reviewed:   Imaging Personally Reviewed:  Electrocardiogram Personally Reviewed:

## 2024-04-15 NOTE — PROGRESS NOTE ADULT - PROBLEM SELECTOR PLAN 2
Hx of  Pulm HTN 2/2 PAH (Group I and IV) on treopostinil (remodulin SQ with prior SQ site infection/abscesses) , complicated by chronic hypoxemic respiratory failure (2L NC) also on Xarelto for hx of RA thrombus  Follows with Dr. Yoon, on treprostinil SQ, sildenafil at home  Appears stable from pulmonary perspective    Plan:  -c/w Remodulin (treprostinil) 48 ng/kg/mg and home sildanefil 20 mg TID  - c/w home spironolactone   - Continue xarelto for hx of RA thrombus/PE  -continue prednisone 10 mg daily  -continue home 2L NC  -avoid CCB or BB due to severe pulmonary HTN Hx of  Pulm HTN 2/2 PAH (Group I and IV) on treopostinil (remodulin SQ with prior SQ site infection/abscesses) , complicated by chronic hypoxemic respiratory failure (2L NC) also on Xarelto for hx of RA thrombus  Follows with Dr. Yoon, on treprostinil SQ, sildenafil at home  Appears stable from pulmonary perspective    Plan:  -c/w Remodulin (treprostinil) 48 ng/kg/mg and home sildanefil 20 mg TID (pharmacy aware and will reach back out)   - c/w home spironolactone   - Continue xarelto for hx of RA thrombus/PE  -continue prednisone 10 mg daily  -continue home 2L NC  -avoid CCB or BB due to severe pulmonary HTN

## 2024-04-16 LAB
-  CLINDAMYCIN: SIGNIFICANT CHANGE UP
-  DAPTOMYCIN: SIGNIFICANT CHANGE UP
-  ERYTHROMYCIN: SIGNIFICANT CHANGE UP
-  GENTAMICIN: SIGNIFICANT CHANGE UP
-  LINEZOLID: SIGNIFICANT CHANGE UP
-  OXACILLIN: SIGNIFICANT CHANGE UP
-  PENICILLIN: SIGNIFICANT CHANGE UP
-  RIFAMPIN: SIGNIFICANT CHANGE UP
-  STAPHYLOCOCCUS EPIDERMIDIS: SIGNIFICANT CHANGE UP
-  TETRACYCLINE: SIGNIFICANT CHANGE UP
-  TRIMETHOPRIM/SULFAMETHOXAZOLE: SIGNIFICANT CHANGE UP
-  VANCOMYCIN: SIGNIFICANT CHANGE UP
ALBUMIN SERPL ELPH-MCNC: 3.7 G/DL — SIGNIFICANT CHANGE UP (ref 3.3–5)
ALP SERPL-CCNC: 59 U/L — SIGNIFICANT CHANGE UP (ref 40–120)
ALT FLD-CCNC: <5 U/L — LOW (ref 10–45)
ANION GAP SERPL CALC-SCNC: 11 MMOL/L — SIGNIFICANT CHANGE UP (ref 5–17)
AST SERPL-CCNC: 9 U/L — LOW (ref 10–40)
BILIRUB SERPL-MCNC: 0.6 MG/DL — SIGNIFICANT CHANGE UP (ref 0.2–1.2)
BUN SERPL-MCNC: 8 MG/DL — SIGNIFICANT CHANGE UP (ref 7–23)
CALCIUM SERPL-MCNC: 8.6 MG/DL — SIGNIFICANT CHANGE UP (ref 8.4–10.5)
CHLORIDE SERPL-SCNC: 106 MMOL/L — SIGNIFICANT CHANGE UP (ref 96–108)
CO2 SERPL-SCNC: 21 MMOL/L — LOW (ref 22–31)
CREAT SERPL-MCNC: 0.93 MG/DL — SIGNIFICANT CHANGE UP (ref 0.5–1.3)
EGFR: 78 ML/MIN/1.73M2 — SIGNIFICANT CHANGE UP
GLUCOSE SERPL-MCNC: 98 MG/DL — SIGNIFICANT CHANGE UP (ref 70–99)
GRAM STN FLD: ABNORMAL
HCT VFR BLD CALC: 40.6 % — SIGNIFICANT CHANGE UP (ref 34.5–45)
HGB BLD-MCNC: 12.2 G/DL — SIGNIFICANT CHANGE UP (ref 11.5–15.5)
MAGNESIUM SERPL-MCNC: 1.8 MG/DL — SIGNIFICANT CHANGE UP (ref 1.6–2.6)
MCHC RBC-ENTMCNC: 20.4 PG — LOW (ref 27–34)
MCHC RBC-ENTMCNC: 30 GM/DL — LOW (ref 32–36)
MCV RBC AUTO: 67.8 FL — LOW (ref 80–100)
METHOD TYPE: SIGNIFICANT CHANGE UP
METHOD TYPE: SIGNIFICANT CHANGE UP
NRBC # BLD: 0 /100 WBCS — SIGNIFICANT CHANGE UP (ref 0–0)
PHOSPHATE SERPL-MCNC: 2.9 MG/DL — SIGNIFICANT CHANGE UP (ref 2.5–4.5)
PLATELET # BLD AUTO: 346 K/UL — SIGNIFICANT CHANGE UP (ref 150–400)
POTASSIUM SERPL-MCNC: 3.6 MMOL/L — SIGNIFICANT CHANGE UP (ref 3.5–5.3)
POTASSIUM SERPL-SCNC: 3.6 MMOL/L — SIGNIFICANT CHANGE UP (ref 3.5–5.3)
PROT SERPL-MCNC: 7.1 G/DL — SIGNIFICANT CHANGE UP (ref 6–8.3)
RBC # BLD: 5.99 M/UL — HIGH (ref 3.8–5.2)
RBC # FLD: 19.4 % — HIGH (ref 10.3–14.5)
SODIUM SERPL-SCNC: 138 MMOL/L — SIGNIFICANT CHANGE UP (ref 135–145)
WBC # BLD: 10.18 K/UL — SIGNIFICANT CHANGE UP (ref 3.8–10.5)
WBC # FLD AUTO: 10.18 K/UL — SIGNIFICANT CHANGE UP (ref 3.8–10.5)

## 2024-04-16 PROCEDURE — 99232 SBSQ HOSP IP/OBS MODERATE 35: CPT | Mod: GC

## 2024-04-16 PROCEDURE — 99222 1ST HOSP IP/OBS MODERATE 55: CPT

## 2024-04-16 RX ORDER — SPIRONOLACTONE 25 MG/1
50 TABLET, FILM COATED ORAL DAILY
Refills: 0 | Status: DISCONTINUED | OUTPATIENT
Start: 2024-04-16 | End: 2024-04-18

## 2024-04-16 RX ADMIN — Medication 10 MILLIGRAM(S): at 05:06

## 2024-04-16 RX ADMIN — Medication 500 MICROGRAM(S): at 05:08

## 2024-04-16 RX ADMIN — Medication 100 MILLIGRAM(S): at 05:07

## 2024-04-16 RX ADMIN — RIVAROXABAN 20 MILLIGRAM(S): KIT at 17:32

## 2024-04-16 RX ADMIN — Medication 20 MILLIGRAM(S): at 21:31

## 2024-04-16 RX ADMIN — Medication 500 MICROGRAM(S): at 00:50

## 2024-04-16 RX ADMIN — Medication 500 MICROGRAM(S): at 12:17

## 2024-04-16 RX ADMIN — PANTOPRAZOLE SODIUM 40 MILLIGRAM(S): 20 TABLET, DELAYED RELEASE ORAL at 05:06

## 2024-04-16 RX ADMIN — Medication 20 MILLIGRAM(S): at 05:07

## 2024-04-16 RX ADMIN — Medication 100 MILLIGRAM(S): at 17:38

## 2024-04-16 RX ADMIN — Medication 500 MICROGRAM(S): at 17:33

## 2024-04-16 RX ADMIN — CHLORHEXIDINE GLUCONATE 1 APPLICATION(S): 213 SOLUTION TOPICAL at 12:18

## 2024-04-16 RX ADMIN — Medication 500 MICROGRAM(S): at 23:06

## 2024-04-16 RX ADMIN — Medication 20 MILLIGRAM(S): at 17:32

## 2024-04-16 NOTE — CONSULT NOTE ADULT - ASSESSMENT
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 Since then, she started to develop worsening abdominal pian and swelling to the RLQ. ID consulted for abx mgnt.    #Allergy to Vancomycin -?Red man syndrome and on 1/4/2023 itching all over body 20 minutes into vanco infusion-no rash or redness    #Recurrent skin abscesses with MRSA ;  ID hx: 8/2022 staph Lugdunensis bacteremia tx with cefazolin 4 weeks, s/p drainage of abdomina labscess- cx with MRSA tx with doxy for 10 days, 1/2024 sugical swab MRSA 5+11/2023 culture abscess MRSA,    Afebrile  WBC on admission 13 now WNL today  4/14 S/p I+D left abdominal abscess   4/14 Prelim culture from abdominal wall abscess SA  4/14 Bld cx 1/2  staph epi urine cx neg  MRSA PCR +  4/14 Ct Abd:  new 3.0 cm left anterior abdominal wall subcutaneous collection noted, compatible with an abscess. Near complete resolution not complete resolution of a previously noted right anterior abdominal wall collection/abscess.  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 Since then, she started to develop worsening abdominal pian and swelling to the RLQ. ID consulted for abx mgnt.    #Allergy to Vancomycin -?Red man syndrome and on 1/4/2023 itching all over body 20 minutes into vanco infusion-no rash or redness    #Recurrent skin abscesses with MRSA ;  ID hx: 8/2022 staph Lugdunensis bacteremia tx with cefazolin 4 weeks, s/p drainage of abdominal abscess- cx with MRSA tx with doxy for 10 days, 1/2024 sugical swab MRSA 5+11/2023 culture abscess MRSA,    Afebrile  WBC on admission 13 now WNL today  4/14 S/p I+D L abdominal abscess following a remodulin pump placement  4/14 Prelim culture from abdominal wall abscess SA  4/14 Bld cx 1/2  staph epi urine cx neg  MRSA PCR +  4/14 Ct Abd:  new 3.0 cm left anterior abdominal wall subcutaneous collection noted, compatible with an abscess. Near complete resolution not complete resolution of a previously noted right anterior abdominal wall collection/abscess.  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 Since then, she started to develop worsening abdominal pian and swelling to the RLQ. ID consulted for abx mgnt.    #Allergy to Vancomycin -?Red man syndrome and on 1/4/2023 itching all over body 20 minutes into vanco infusion-no rash or redness    #Recurrent skin abscesses with MRSA ;  ID hx: 8/2022 staph Lugdunensis bacteremia tx with cefazolin 4 weeks, s/p drainage of abdominal abscess- cx with MRSA tx with doxy for 10 days, 1/2024 sugical swab MRSA 5+11/2023 culture abscess MRSA,    Afebrile  WBC on admission 13 now WNL today-10  4/14 S/p I+D L abdominal abscess following a remodulin pump placement  4/14 Prelim culture from abdominal wall abscess SA  4/14 Bld cx 1/2  staph epi urine cx neg  MRSA PCR +  4/14 Ct Abd:  new 3.0 cm left anterior abdominal wall subcutaneous collection noted, compatible with an abscess. Near complete resolution not complete resolution of a previously noted right anterior abdominal wall collection/abscess.  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 Since then, she started to develop worsening abdominal pian and swelling to the RLQ. ID consulted for abx mgnt.    #Allergy to Vancomycin -?Red man syndrome and on 1/4/2023 itching all over body 20 minutes into vanco infusion-no rash or redness    #Recurrent skin abscesses with MRSA ;  ID hx: 8/2022 staph Lugdunensis bacteremia tx with cefazolin 4 weeks, s/p drainage of abdominal abscess- cx with MRSA tx with doxy for 10 days, 1/2024 sugical swab MRSA 5+11/2023 culture abscess MRSA,    Afebrile  WBC on admission 13 now WNL today-10  4/14 S/p I+D L abdominal abscess following a remodulin pump placement  4/14 Prelim culture from abdominal wall abscess SA  4/14 Bld cx 1/2  staph epi urine cx neg  MRSA PCR +  4/14 Ct Abd:  new 3.0 cm left anterior abdominal wall subcutaneous collection noted, compatible with an abscess. Near complete resolution not complete resolution of a previously noted right anterior abdominal wall collection/abscess.  Received cefazolin 2g x 1 on 4/14,  currently on doxy 100 q 12h 4/14-->    Plan   Overall patient with I+D drainage in ED with packing and continued active draining of purulent material seen.   Wound care mgnt per primary team vs surgery  Staph epi likely contaminant  If purulence continues to drain pt may require further imaging to determine improvement in abscess.  Continue Doxycyline at current dosing  Follow up all cultures  Monitor for fevers  Continue to trend wbc  Plan d/w Dr. Martinez

## 2024-04-16 NOTE — PROGRESS NOTE ADULT - ATTENDING COMMENTS
Agree with above  43F PMH pulmonary hypertension (Group I and IV on Remodulin / Xarelto / home O2 2LNC), H/O PE, multiple abdominal wall abscesses admitted for I&D of abdominal abscess.  - c/w Remodulin via pump  - oxygen as needed to maintain spO2 >94%, Currently on 2LNC spO2 95%  - c/w Xarelto  - Now s/p I&D abscess; preliminarily Staph aureus (MRSA nares positive), await final ID/SS, on doxycycline.  - One blood culture positive Staph epidermidis, repeat culture as likely contaminant (but given SSTI, not entirely ruled out)  - Hemodynamically stable, c/w sildenafil 20mg TID.  - c/w prednisone at home dose  - No respiratory complaints at present.  - We will continue to follow the patient with you.

## 2024-04-16 NOTE — PROGRESS NOTE ADULT - PROBLEM SELECTOR PLAN 4
Right heart failure with normal LVEF in setting of PAH, appears euvolemic this admission, on home O2 2L requirement  TTE 8/23:  1. RV volume and pressure overload. Normal LV systolic fx w EF 55 to 60 %.   2. Mildly enlarged right ventricular cavity size and reduced systolic right ventricular function. 3. Device lead is visualized in the right heart.  RHC 7/23/20 RA 4, RV 80/4, PA 80/40/57, PCWP 22, LVEDP 4; CO/CI 4/2.0; PVR 11 (using EDP 13).   EKG this admission: Sinus tachycardia, R axis deviation, RVH,     Plan:  - Continue spironolactone 25 mg daily  -c/w home Lasix 20 mg MWF  - Replete electrolytes to K>4.0 and Mg >2.5 Right heart failure with normal LVEF in setting of PAH, appears euvolemic this admission, on home O2 2L requirement  TTE 8/23:  1. RV volume and pressure overload. Normal LV systolic fx w EF 55 to 60 %.   2. Mildly enlarged right ventricular cavity size and reduced systolic right ventricular function. 3. Device lead is visualized in the right heart.  RHC 7/23/20 RA 4, RV 80/4, PA 80/40/57, PCWP 22, LVEDP 4; CO/CI 4/2.0; PVR 11 (using EDP 13).   EKG this admission: Sinus tachycardia, R axis deviation, RVH,     Plan:  - on home spironolactone 25 mg daily, increase to 50mg 4/16   -c/w home Lasix 20 mg MWF  - Replete electrolytes to K>4.0 and Mg >2.5

## 2024-04-16 NOTE — PROGRESS NOTE ADULT - PROBLEM SELECTOR PLAN 2
Hx of  Pulm HTN 2/2 PAH (Group I and IV) on treopostinil (remodulin SQ with prior SQ site infection/abscesses) , complicated by chronic hypoxemic respiratory failure (2L NC) also on Xarelto for hx of RA thrombus  Follows with Dr. Yoon, on treprostinil SQ, sildenafil at home  Appears stable from pulmonary perspective    Plan:  -c/w Remodulin (treprostinil) 48 ng/kg/mg and home sildanefil 20 mg TID (pharmacy aware and will reach back out)   - c/w home spironolactone   - Continue xarelto for hx of RA thrombus/PE  -continue prednisone 10 mg daily  -continue home 2L NC  -avoid CCB or BB due to severe pulmonary HTN Hx of  Pulm HTN 2/2 PAH (Group I and IV) on treopostinil (remodulin SQ with prior SQ site infection/abscesses) , complicated by chronic hypoxemic respiratory failure (2L NC) also on Xarelto for hx of RA thrombus  Follows with Dr. Yoon, on treprostinil SQ, sildenafil at home  Appears stable from pulmonary perspective    Plan:  -c/w Remodulin (treprostinil) 48 ng/kg/mg and home sildanefil 20 mg TID   - c/w home spironolactone   - Continue xarelto for hx of RA thrombus/PE  -continue prednisone 10 mg daily  -continue home 2L NC  -avoid CCB or BB due to severe pulmonary HTN

## 2024-04-16 NOTE — PROGRESS NOTE ADULT - ATTENDING COMMENTS
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   Endorsing intermittent SOB, sometimes with wheezing, stable abd pain, no LE edema.     - c/w doxycycline for now based on prior culture data   - 1/4 Bcx + strep epi; abscess cx + staph --> suspect Bcx is contaminant, but will consult ID for further recs given h/o bacteremia. Repeat Bcx ordered.   - c/w Rimodulin for now, pump site RLQ   - on home O2 2LNC, c/w nebs as needed. if SOB persists, will d/c pulmonary regarding increasing steroids transiently   - Continue xarelto for hx of RA thrombus/PE  - c/w home lasix, increase aldactone per renal for RHF.

## 2024-04-16 NOTE — CONSULT NOTE ADULT - ASSESSMENT
43 year old F with a history of pulmonary HTN (group I and IV on Rimodulin and Xarelto, on home 2L O2), RHF in the setting of PAH, RA thrombus s/p thrombectomy, history of PE (on Xarelto)  multiple abdominal wall abscesses  Hypophosphatemia     1 Renal - Phos improved   Volume status seems euvolemic at present;  Increase Aldactone to 50mg po qd   No need for renal sono  2 CVS-Tachycardia is common for her   Not on bblockers   3 Pulm-Nasal canula and on remodulin;  No need for stress steroids   4 ID- 1/4 blood cx positive at present;  IV abx     Sayed [facility]   [phone]

## 2024-04-16 NOTE — CONSULT NOTE ADULT - SUBJECTIVE AND OBJECTIVE BOX
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 The patient states she usually has her remodulin pump placed on RLQ and last week developed pain and swelling to that area, and changed her pump site to LLQ. Since then, she started to develop worsening abdominal pian and swelling to the RLQ and went to see her pulmonologist and PCP Dr. Yoon who recommended she present to the ED for evaluation of the abscess. The patient denies any fevers, chills, nausea, vomiting, worsening dyspnea on exertion.     Afebrile, VSS. Labs notable for WBC 13.33 w neutrophilic predominance, K 3.2, Lactate wnl, UA neg  CXR: Unchanged enlarged pulmonary arteries .L chest wall pacemaker, No focal consolidation.  CT A/P:  New 2.5 x 3.0 cm left anterior abdominal wall subcutaneous walled off collection, and smaller, approximate 1.2 cm right anterior abdominal wall subcutaneous collection is noted with associated skin thickening (also previously noted).     In the ED, her abscess was drained and cultures were sent and patient admitted for further management, s/p 1 dose of cefazolin and doxy  (14 Apr 2024 19:59)     REVIEW OF SYSTEMS  [  ] ROS unobtainable because:    [ x ] All other systems negative except as noted below  Constitutional:  [ ] fever [ ] chills  [ ] weight loss  [ ]night sweat  [ ]poor appetite/PO intake [ ]fatigue   Skin:  [ ] rash [ ] phlebitis	  Eyes: [ ] icterus [ ] pain  [ ] discharge	  ENMT: [ ] sore throat  [ ] thrush [ ] ulcers [ ] exudates [ ]anosmia  Respiratory: [ ] dyspnea [ ] hemoptysis [ ] cough [ ] sputum	  Cardiovascular:  [ ] chest pain [ ] palpitations [ ] edema	  Gastrointestinal:  [ ] nausea [ ] vomiting [ ] diarrhea [ ] constipation [ ] pain	  Genitourinary:  [ ] dysuria [ ] frequency [ ] hematuria [ ] discharge [ ] flank pain  [ ] incontinence  Musculoskeletal:  [ ] myalgias [ ] arthralgias [ ] arthritis  [ ] back pain  Neurological:  [ ] headache [ ] weakness [ ] seizures  [ ] confusion/altered mental status  prior hospital charts reviewed [V]  primary team notes reviewed [V]  other consultant notes reviewed [V]    PAST MEDICAL & SURGICAL HISTORY:  Tachycardia      Anemia      Pulmonary hypertension      Pulmonary embolism      Asthma  On home O2 nightly at 2L via NC      PE (pulmonary thromboembolism)  on Xarelto 10 mg daily      Sinusitis      Corneal disorder      Right heart failure      CAD (coronary artery disease)      H/O pulmonary hypertension      Pulmonary embolism      Asthma      History of tachycardia      On supplemental oxygen by nasal cannula  O2 @ 2L      Pacemaker      Skin mass  left upper thigh mass      History of tubal ligation      Pacemaker      H/O tubal ligation      History of pacemaker  12/19 - [employer] model Ingevity 4469          SOCIAL HISTORY:  - Denied smoking/vaping/alcohol/recreational drug use    FAMILY HISTORY:  Family history of diabetes mellitus  in brother    Family history of diabetes mellitus in mother    FH: anemia        Allergies  penicillin (Rash)  adhesives (Rash)  vancomycin (Rash)        ANTIMICROBIALS:  doxycycline IVPB 100 every 12 hours      ANTIMICROBIALS (past 90 days):  MEDICATIONS  (STANDING):  ceFAZolin   IVPB   100 mL/Hr IV Intermittent (04-14-24 @ 15:08)    doxycycline IVPB   100 mL/Hr IV Intermittent (04-14-24 @ 15:48)    doxycycline IVPB   100 mL/Hr IV Intermittent (04-16-24 @ 05:07)   100 mL/Hr IV Intermittent (04-15-24 @ 17:12)   100 mL/Hr IV Intermittent (04-15-24 @ 06:06)        OTHER MEDS:   MEDICATIONS  (STANDING):  furosemide    Tablet 20 <User Schedule>  ipratropium    for Nebulization 500 every 6 hours  pantoprazole    Tablet 40 before breakfast  predniSONE   Tablet 10 daily  rivaroxaban 20 with dinner  sildenafil (REVATIO) 20 every 8 hours  spironolactone 50 daily      VITALS:  Vital Signs Last 24 Hrs  T(F): 97.8 (04-16-24 @ 08:45), Max: 99 (04-14-24 @ 12:59)    Vital Signs Last 24 Hrs  HR: 111 (04-16-24 @ 08:45) (100 - 114)  BP: 93/65 (04-16-24 @ 08:45) (91/63 - 122/83)  RR: 18 (04-16-24 @ 08:45)  SpO2: 96% (04-16-24 @ 08:45) (92% - 97%)  Wt(kg): --    EXAM:    GA: NAD, AOx3  HEENT: oral cavity no lesion  CV: nl S1/S2, no RMG  Lungs: CTAB, No distress  Abd: BS+, soft, nontender, no rebounding pain  Ext: no edema  Neuro: No focal deficits  Skin: Intact  IV: no phlebitis  Labs:                        12.2   10.18 )-----------( 346      ( 16 Apr 2024 08:25 )             40.6     04-16    138  |  106  |  8   ----------------------------<  98  3.6   |  21<L>  |  0.93    Ca    8.6      16 Apr 2024 08:25  Phos  2.9     04-16  Mg     1.8     04-16    TPro  7.1  /  Alb  3.7  /  TBili  0.6  /  DBili  x   /  AST  9<L>  /  ALT  <5<L>  /  AlkPhos  59  04-16    WBC Trend:  WBC Count: 10.18 (04-16-24 @ 08:25)  WBC Count: 11.24 (04-15-24 @ 10:56)  WBC Count: 13.33 (04-14-24 @ 15:19)    Auto Neutrophil #: 10.19 K/uL (04-15-24 @ 10:56)  Auto Neutrophil #: 10.89 K/uL (04-14-24 @ 15:19)  Auto Neutrophil #: 6.74 K/uL (10-13-23 @ 22:01)  Auto Neutrophil #: 6.91 K/uL (08-12-23 @ 10:04)  Auto Neutrophil #: 5.99 K/uL (06-07-23 @ 12:24)    Creatine Trend:  Creatinine: 0.93 (04-16)  Creatinine: 0.94 (04-15)  Creatinine: 0.93 (04-14)    Liver Biochemical Testing Trend:  Alanine Aminotransferase (ALT/SGPT): <5 *L* (04-16)  Alanine Aminotransferase (ALT/SGPT): 6 *L* (04-15)  Alanine Aminotransferase (ALT/SGPT): 7 *L* (04-14)  Alanine Aminotransferase (ALT/SGPT): 8 *L* (10-13)  Alanine Aminotransferase (ALT/SGPT): 8 *L* (08-12)  Aspartate Aminotransferase (AST/SGOT): 9 (04-16-24 @ 08:25)  Aspartate Aminotransferase (AST/SGOT): 10 (04-15-24 @ 10:56)  Aspartate Aminotransferase (AST/SGOT): 6 (04-14-24 @ 15:19)  Aspartate Aminotransferase (AST/SGOT): 11 (10-13-23 @ 22:01)  Aspartate Aminotransferase (AST/SGOT): 10 (08-12-23 @ 10:04)  Bilirubin Total: 0.6 (04-16)  Bilirubin Total: 0.9 (04-15)  Bilirubin Total: 0.7 (04-14)  Bilirubin Total: 0.4 (10-13)  Bilirubin Total: 0.7 (08-12)    Trend LDH    Auto Eosinophil %: 0.4 % (04-15-24 @ 10:56)  Auto Eosinophil %: 0.0 % (04-14-24 @ 15:19)    Urinalysis Basic - ( 16 Apr 2024 08:25 )    Color: x / Appearance: x / SG: x / pH: x  Gluc: 98 mg/dL / Ketone: x  / Bili: x / Urobili: x   Blood: x / Protein: x / Nitrite: x   Leuk Esterase: x / RBC: x / WBC x   Sq Epi: x / Non Sq Epi: x / Bacteria: x      MICROBIOLOGY:    MRSA PCR Result.: Detected (04-14-24 @ 15:19)  MRSA PCR Result.: NotDetec (06-07-23 @ 13:40)      Culture - Abscess with Gram Stain (collected 14 Apr 2024 19:52)  Source: .Abscess left abdominal wall  Preliminary Report:    Moderate Staphylococcus aureus    Culture - Blood (collected 14 Apr 2024 15:15)  Source: .Blood Blood-Peripheral  Preliminary Report:    No growth at 24 hours    Culture - Blood (collected 14 Apr 2024 15:00)  Source: .Blood Blood-Peripheral  Preliminary Report:    Growth in anaerobic bottle: Gram Positive Cocci in Clusters    Direct identification is available within approximately 3-5    hours either by Blood Panel Multiplexed PCR or Direct    MALDI-TOF. Details: [email or web address]  Organism: Blood Culture PCR  Organism: Blood Culture PCR    Sensitivities:      Method Type: PCR      -  Staphylococcus epidermidis: Detec    Culture - Urine (collected 14 Apr 2024 14:18)  Source: Clean Catch Clean Catch (Midstream)  Final Report:    <10,000 CFU/mL Normal Urogenital Laura    Culture - Abscess with Gram Stain (collected 04 Jan 2023 00:33)  Source: .Abscess abdominal  Final Report:    Numerous Methicillin Resistant Staphylococcus aureus  Organism: Methicillin resistant Staphylococcus aureus  Organism: Methicillin resistant Staphylococcus aureus    Sensitivities:      Method Type: BRIAN      -  Ampicillin/Sulbactam: R <=8/4      -  Cefazolin: R 16      -  Clindamycin: R >4      -  Daptomycin: S 1      -  Erythromycin: R >4      -  Gentamicin: S <=1 Should not be used as monotherapy      -  Linezolid: S 2      -  Oxacillin: R >2      -  Penicillin: R >8      -  Rifampin: S <=1 Should not be used as monotherapy      -  Tetracycline: S <=1      -  Trimethoprim/Sulfamethoxazole: S <=0.5/9.5      -  Vancomycin: S 2    Culture - Blood (collected 03 Jan 2023 15:38)  Source: .Blood Blood-Peripheral  Final Report:    No Growth Final    Culture - Blood (collected 03 Jan 2023 15:26)  Source: .Blood Blood-Peripheral  Final Report:    No Growth Final    Culture - Abscess with Gram Stain (collected 08 Sep 2022 20:12)  Source: .Abscess Skin  Final Report:    Moderate Methicillin Resistant Staphylococcus aureus  Organism: Methicillin resistant Staphylococcus aureus  Organism: Methicillin resistant Staphylococcus aureus    Sensitivities:      Method Type: BRIAN      -  Ampicillin/Sulbactam: R 16/8      -  Cefazolin: R >16      -  Clindamycin: R >4      -  Daptomycin: S 1      -  Erythromycin: R >4      -  Gentamicin: S <=1 Should not be used as monotherapy      -  Linezolid: S 2      -  Oxacillin: R >2      -  Penicillin: R >8      -  Rifampin: S <=1 Should not be used as monotherapy      -  Tetra/Doxy: S <=1      -  Trimethoprim/Sulfamethoxazole: S <=0.5/9.5      -  Vancomycin: S 2    Culture - Abscess with Gram Stain (collected 08 Sep 2022 20:12)  Source: .Abscess Skin  Final Report:    Few Methicillin Resistant Staphylococcus aureus  Organism: Methicillin resistant Staphylococcus aureus  Organism: Methicillin resistant Staphylococcus aureus    Sensitivities:      Method Type: BRIAN      -  Ampicillin/Sulbactam: R >16/8      -  Cefazolin: R >16      -  Clindamycin: R >4      -  Daptomycin: S 1      -  Erythromycin: R >4      -  Gentamicin: S <=1 Should not be used as monotherapy      -  Linezolid: S 2      -  Oxacillin: R >2      -  Penicillin: R >8      -  Rifampin: S <=1 Should not be used as monotherapy      -  Tetra/Doxy: S <=1      -  Trimethoprim/Sulfamethoxazole: S <=0.5/9.5      -  Vancomycin: S 2    Culture - Blood (collected 07 Sep 2022 17:37)  Source: .Blood Blood-Venous  Final Report:    No Growth Final                                            Blood Gas Venous - Lactate: 1.0 (04-14 @ 15:09)        CSF:    RADIOLOGY:  < from: CT Abdomen and Pelvis w/ IV Cont (04.14.24 @ 16:46) >  FINDINGS:  LOWER CHEST: Within normal limits.    LIVER: Within normal limits.  BILE DUCTS: Normal caliber.  GALLBLADDER: Within normal limits.  SPLEEN: Stable 1.2 cm hypodensity.  PANCREAS: Within normal limits.  ADRENALS: Within normal limits.  KIDNEYS/URETERS: Symmetric enhancement. No collecting system obstruction   bilaterally.    BLADDER: Within normal limits.  REPRODUCTIVE ORGANS: An IUD is noted.    BOWEL: No bowel obstruction. Appendix is normal.  PERITONEUM: No ascites.  VESSELS: Within normallimits.  RETROPERITONEUM/LYMPH NODES: No lymphadenopathy.  ABDOMINAL WALL: A new, 2.5 x 3.0 cm left anterior abdominal wall   subcutaneous walled off collection is noted with adjacent skin   thickening. A smaller, approximate 1.2 cm right anterior abdominal wall   subcutaneous collection is noted with associated skin thickening. This is   in the approximate location of a previously noted right anterior   abdominal wall subcutaneous abscess.  BONES: Degenerative changes.    IMPRESSION:  1.  A new,3.0 cm left anterior abdominal wall subcutaneous collection   noted, compatible with an abscess.  2.  Near complete resolution not complete resolution of a previously   noted right anterior abdominal wall collection/abscess.    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 The patient states she usually has her remodulin pump placed on RLQ and last week developed pain and swelling to that area, and changed her pump site to LLQ. Since then, she started to develop worsening abdominal pian and swelling to the RLQ and went to see her pulmonologist and PCP Dr. Yoon who recommended she present to the ED for evaluation of the abscess. The patient denies any fevers, chills, nausea, vomiting, worsening dyspnea on exertion.     Afebrile, VSS. Labs notable for WBC 13.33 w neutrophilic predominance, K 3.2, Lactate wnl, UA neg  CXR: Unchanged enlarged pulmonary arteries .L chest wall pacemaker, No focal consolidation.  CT A/P:  New 2.5 x 3.0 cm left anterior abdominal wall subcutaneous walled off collection, and smaller, approximate 1.2 cm right anterior abdominal wall subcutaneous collection is noted with associated skin thickening (also previously noted).     In the ED, her abscess was drained and cultures were sent and patient admitted for further management, s/p 1 dose of cefazolin and doxy  (14 Apr 2024 19:59)     REVIEW OF SYSTEMS  [  ] ROS unobtainable because:    [ x ] All other systems negative except as noted below  Constitutional:  [ ] fever [ ] chills  [ ] weight loss  [ ]night sweat  [ ]poor appetite/PO intake [ ]fatigue   Skin:  [ ] rash [ ] phlebitis	  Eyes: [ ] icterus [ ] pain  [ ] discharge	  ENMT: [ ] sore throat  [ ] thrush [ ] ulcers [ ] exudates [ ]anosmia  Respiratory: [ ] dyspnea [ ] hemoptysis [ ] cough [ ] sputum	  Cardiovascular:  [ ] chest pain [ ] palpitations [ ] edema	  Gastrointestinal:  [ ] nausea [ ] vomiting [ ] diarrhea [ ] constipation [x ] pain	  Genitourinary:  [ ] dysuria [ ] frequency [ ] hematuria [ ] discharge [ ] flank pain  [ ] incontinence  Musculoskeletal:  [ ] myalgias [ ] arthralgias [ ] arthritis  [ ] back pain  Neurological:  [ ] headache [ ] weakness [ ] seizures  [ ] confusion/altered mental status  prior hospital charts reviewed [V]  primary team notes reviewed [V]  other consultant notes reviewed [V]    PAST MEDICAL & SURGICAL HISTORY:  Tachycardia      Anemia      Pulmonary hypertension      Pulmonary embolism      Asthma  On home O2 nightly at 2L via NC      PE (pulmonary thromboembolism)  on Xarelto 10 mg daily      Sinusitis      Corneal disorder      Right heart failure      CAD (coronary artery disease)      H/O pulmonary hypertension      Pulmonary embolism      Asthma      History of tachycardia      On supplemental oxygen by nasal cannula  O2 @ 2L      Pacemaker      Skin mass  left upper thigh mass      History of tubal ligation      Pacemaker      H/O tubal ligation      History of pacemaker  12/19 - [email or web address] model Ingevity 4469          SOCIAL HISTORY:  - Denied smoking/vaping/alcohol/recreational drug use    FAMILY HISTORY:  Family history of diabetes mellitus  in brother    Family history of diabetes mellitus in mother    FH: anemia        Allergies  penicillin (Rash)  adhesives (Rash)  vancomycin (Rash)        ANTIMICROBIALS:  doxycycline IVPB 100 every 12 hours      ANTIMICROBIALS (past 90 days):  MEDICATIONS  (STANDING):  ceFAZolin   IVPB   100 mL/Hr IV Intermittent (04-14-24 @ 15:08)    doxycycline IVPB   100 mL/Hr IV Intermittent (04-14-24 @ 15:48)    doxycycline IVPB   100 mL/Hr IV Intermittent (04-16-24 @ 05:07)   100 mL/Hr IV Intermittent (04-15-24 @ 17:12)   100 mL/Hr IV Intermittent (04-15-24 @ 06:06)        OTHER MEDS:   MEDICATIONS  (STANDING):  furosemide    Tablet 20 <User Schedule>  ipratropium    for Nebulization 500 every 6 hours  pantoprazole    Tablet 40 before breakfast  predniSONE   Tablet 10 daily  rivaroxaban 20 with dinner  sildenafil (REVATIO) 20 every 8 hours  spironolactone 50 daily      VITALS:  Vital Signs Last 24 Hrs  T(F): 97.8 (04-16-24 @ 08:45), Max: 99 (04-14-24 @ 12:59)    Vital Signs Last 24 Hrs  HR: 111 (04-16-24 @ 08:45) (100 - 114)  BP: 93/65 (04-16-24 @ 08:45) (91/63 - 122/83)  RR: 18 (04-16-24 @ 08:45)  SpO2: 96% (04-16-24 @ 08:45) (92% - 97%)  Wt(kg): --    EXAM:    GA: NAD, AOx3  HEENT: oral cavity no lesion  CV: nl S1/S2, no RMG  Lungs: CTAB, No distress  Abd: RLQ s/p I+D of abscess with packing and purulent drainage.  BS+, soft, nontender, no rebounding pain  Ext: no edema  Neuro: No focal deficits  Skin: Intact  IV: no phlebitis  Labs:                        12.2   10.18 )-----------( 346      ( 16 Apr 2024 08:25 )             40.6     04-16    138  |  106  |  8   ----------------------------<  98  3.6   |  21<L>  |  0.93    Ca    8.6      16 Apr 2024 08:25  Phos  2.9     04-16  Mg     1.8     04-16    TPro  7.1  /  Alb  3.7  /  TBili  0.6  /  DBili  x   /  AST  9<L>  /  ALT  <5<L>  /  AlkPhos  59  04-16    WBC Trend:  WBC Count: 10.18 (04-16-24 @ 08:25)  WBC Count: 11.24 (04-15-24 @ 10:56)  WBC Count: 13.33 (04-14-24 @ 15:19)    Auto Neutrophil #: 10.19 K/uL (04-15-24 @ 10:56)  Auto Neutrophil #: 10.89 K/uL (04-14-24 @ 15:19)  Auto Neutrophil #: 6.74 K/uL (10-13-23 @ 22:01)  Auto Neutrophil #: 6.91 K/uL (08-12-23 @ 10:04)  Auto Neutrophil #: 5.99 K/uL (06-07-23 @ 12:24)    Creatine Trend:  Creatinine: 0.93 (04-16)  Creatinine: 0.94 (04-15)  Creatinine: 0.93 (04-14)    Liver Biochemical Testing Trend:  Alanine Aminotransferase (ALT/SGPT): <5 *L* (04-16)  Alanine Aminotransferase (ALT/SGPT): 6 *L* (04-15)  Alanine Aminotransferase (ALT/SGPT): 7 *L* (04-14)  Alanine Aminotransferase (ALT/SGPT): 8 *L* (10-13)  Alanine Aminotransferase (ALT/SGPT): 8 *L* (08-12)  Aspartate Aminotransferase (AST/SGOT): 9 (04-16-24 @ 08:25)  Aspartate Aminotransferase (AST/SGOT): 10 (04-15-24 @ 10:56)  Aspartate Aminotransferase (AST/SGOT): 6 (04-14-24 @ 15:19)  Aspartate Aminotransferase (AST/SGOT): 11 (10-13-23 @ 22:01)  Aspartate Aminotransferase (AST/SGOT): 10 (08-12-23 @ 10:04)  Bilirubin Total: 0.6 (04-16)  Bilirubin Total: 0.9 (04-15)  Bilirubin Total: 0.7 (04-14)  Bilirubin Total: 0.4 (10-13)  Bilirubin Total: 0.7 (08-12)    Trend LDH    Auto Eosinophil %: 0.4 % (04-15-24 @ 10:56)  Auto Eosinophil %: 0.0 % (04-14-24 @ 15:19)    Urinalysis Basic - ( 16 Apr 2024 08:25 )    Color: x / Appearance: x / SG: x / pH: x  Gluc: 98 mg/dL / Ketone: x  / Bili: x / Urobili: x   Blood: x / Protein: x / Nitrite: x   Leuk Esterase: x / RBC: x / WBC x   Sq Epi: x / Non Sq Epi: x / Bacteria: x      MICROBIOLOGY:    MRSA PCR Result.: Detected (04-14-24 @ 15:19)  MRSA PCR Result.: NotDetec (06-07-23 @ 13:40)      Culture - Abscess with Gram Stain (collected 14 Apr 2024 19:52)  Source: .Abscess left abdominal wall  Preliminary Report:    Moderate Staphylococcus aureus    Culture - Blood (collected 14 Apr 2024 15:15)  Source: .Blood Blood-Peripheral  Preliminary Report:    No growth at 24 hours    Culture - Blood (collected 14 Apr 2024 15:00)  Source: .Blood Blood-Peripheral  Preliminary Report:    Growth in anaerobic bottle: Gram Positive Cocci in Clusters    Direct identification is available within approximately 3-5    hours either by Blood Panel Multiplexed PCR or Direct    MALDI-TOF. Details: [email or web address]  Organism: Blood Culture PCR  Organism: Blood Culture PCR    Sensitivities:      Method Type: PCR      -  Staphylococcus epidermidis: Detec    Culture - Urine (collected 14 Apr 2024 14:18)  Source: Clean Catch Clean Catch (Midstream)  Final Report:    <10,000 CFU/mL Normal Urogenital Laura    Culture - Abscess with Gram Stain (collected 04 Jan 2023 00:33)  Source: .Abscess abdominal  Final Report:    Numerous Methicillin Resistant Staphylococcus aureus  Organism: Methicillin resistant Staphylococcus aureus  Organism: Methicillin resistant Staphylococcus aureus    Sensitivities:      Method Type: BRIAN      -  Ampicillin/Sulbactam: R <=8/4      -  Cefazolin: R 16      -  Clindamycin: R >4      -  Daptomycin: S 1      -  Erythromycin: R >4      -  Gentamicin: S <=1 Should not be used as monotherapy      -  Linezolid: S 2      -  Oxacillin: R >2      -  Penicillin: R >8      -  Rifampin: S <=1 Should not be used as monotherapy      -  Tetracycline: S <=1      -  Trimethoprim/Sulfamethoxazole: S <=0.5/9.5      -  Vancomycin: S 2    Culture - Blood (collected 03 Jan 2023 15:38)  Source: .Blood Blood-Peripheral  Final Report:    No Growth Final    Culture - Blood (collected 03 Jan 2023 15:26)  Source: .Blood Blood-Peripheral  Final Report:    No Growth Final    Culture - Abscess with Gram Stain (collected 08 Sep 2022 20:12)  Source: .Abscess Skin  Final Report:    Moderate Methicillin Resistant Staphylococcus aureus  Organism: Methicillin resistant Staphylococcus aureus  Organism: Methicillin resistant Staphylococcus aureus    Sensitivities:      Method Type: BRIAN      -  Ampicillin/Sulbactam: R 16/8      -  Cefazolin: R >16      -  Clindamycin: R >4      -  Daptomycin: S 1      -  Erythromycin: R >4      -  Gentamicin: S <=1 Should not be used as monotherapy      -  Linezolid: S 2      -  Oxacillin: R >2      -  Penicillin: R >8      -  Rifampin: S <=1 Should not be used as monotherapy      -  Tetra/Doxy: S <=1      -  Trimethoprim/Sulfamethoxazole: S <=0.5/9.5      -  Vancomycin: S 2    Culture - Abscess with Gram Stain (collected 08 Sep 2022 20:12)  Source: .Abscess Skin  Final Report:    Few Methicillin Resistant Staphylococcus aureus  Organism: Methicillin resistant Staphylococcus aureus  Organism: Methicillin resistant Staphylococcus aureus    Sensitivities:      Method Type: BRIAN      -  Ampicillin/Sulbactam: R >16/8      -  Cefazolin: R >16      -  Clindamycin: R >4      -  Daptomycin: S 1      -  Erythromycin: R >4      -  Gentamicin: S <=1 Should not be used as monotherapy      -  Linezolid: S 2      -  Oxacillin: R >2      -  Penicillin: R >8      -  Rifampin: S <=1 Should not be used as monotherapy      -  Tetra/Doxy: S <=1      -  Trimethoprim/Sulfamethoxazole: S <=0.5/9.5      -  Vancomycin: S 2    Culture - Blood (collected 07 Sep 2022 17:37)  Source: .Blood Blood-Venous  Final Report:    No Growth Final      Blood Gas Venous - Lactate: 1.0 (04-14 @ 15:09)      CSF:    RADIOLOGY:  < from: CT Abdomen and Pelvis w/ IV Cont (04.14.24 @ 16:46) >  FINDINGS:  LOWER CHEST: Within normal limits.    LIVER: Within normal limits.  BILE DUCTS: Normal caliber.  GALLBLADDER: Within normal limits.  SPLEEN: Stable 1.2 cm hypodensity.  PANCREAS: Within normal limits.  ADRENALS: Within normal limits.  KIDNEYS/URETERS: Symmetric enhancement. No collecting system obstruction   bilaterally.    BLADDER: Within normal limits.  REPRODUCTIVE ORGANS: An IUD is noted.    BOWEL: No bowel obstruction. Appendix is normal.  PERITONEUM: No ascites.  VESSELS: Within normallimits.  RETROPERITONEUM/LYMPH NODES: No lymphadenopathy.  ABDOMINAL WALL: A new, 2.5 x 3.0 cm left anterior abdominal wall   subcutaneous walled off collection is noted with adjacent skin   thickening. A smaller, approximate 1.2 cm right anterior abdominal wall   subcutaneous collection is noted with associated skin thickening. This is   in the approximate location of a previously noted right anterior   abdominal wall subcutaneous abscess.  BONES: Degenerative changes.    IMPRESSION:  1.  A new,3.0 cm left anterior abdominal wall subcutaneous collection   noted, compatible with an abscess.  2.  Near complete resolution not complete resolution of a previously   noted right anterior abdominal wall collection/abscess.    < end of copied text >

## 2024-04-16 NOTE — PROGRESS NOTE ADULT - SUBJECTIVE AND OBJECTIVE BOX
Interval Events: wound Cx + staph aures and Bcx + GPC in clusters    PHYSICAL EXAM:  General: nad  HEENT: MMM, PERRLA  Respiratory: ctab, on RA  Cardiovascular: RRR, no MRG  Abdomen: mild tenderness in lower quadrants bilat  Extremities: no LE edema  Neurological: AOx3, no gross deficits    REVIEW OF SYSTEMS:  All systems negative unless described below:    OBJECTIVE:  ICU Vital Signs Last 24 Hrs  T(C): 36.5 (16 Apr 2024 12:31), Max: 36.7 (15 Apr 2024 17:38)  T(F): 97.7 (16 Apr 2024 12:31), Max: 98.1 (15 Apr 2024 20:26)  HR: 102 (16 Apr 2024 12:31) (100 - 114)  BP: 85/64 (16 Apr 2024 12:31) (85/64 - 122/83)  BP(mean): --  ABP: --  ABP(mean): --  RR: 18 (16 Apr 2024 12:31) (18 - 19)  SpO2: 95% (16 Apr 2024 12:31) (92% - 97%)    O2 Parameters below as of 16 Apr 2024 12:31  Patient On (Oxygen Delivery Method): nasal cannula  O2 Flow (L/min): 2            CAPILLARY BLOOD GLUCOSE              HOSPITAL MEDICATIONS:  rivaroxaban 20 milliGRAM(s) Oral with dinner    doxycycline IVPB 100 milliGRAM(s) IV Intermittent every 12 hours    furosemide    Tablet 20 milliGRAM(s) Oral <User Schedule>  sildenafil (REVATIO) 20 milliGRAM(s) Oral every 8 hours  spironolactone 50 milliGRAM(s) Oral daily    predniSONE   Tablet 10 milliGRAM(s) Oral daily    ipratropium    for Nebulization 500 MICROGram(s) Nebulizer every 6 hours      pantoprazole    Tablet 40 milliGRAM(s) Oral before breakfast            chlorhexidine 2% Cloths 1 Application(s) Topical daily  mupirocin 2% Nasal 1 Application(s) Both Nostrils two times a day    Remodulin (treprostinil) SubQ Infusion 1 Dose(s) 1 Dose(s) SubCutaneous Continuous Pump      LABS:                        12.2   10.18 )-----------( 346      ( 16 Apr 2024 08:25 )             40.6     Hgb Trend: 12.2<--, 12.7<--, 13.0<--  04-16    138  |  106  |  8   ----------------------------<  98  3.6   |  21<L>  |  0.93    Ca    8.6      16 Apr 2024 08:25  Phos  2.9     04-16  Mg     1.8     04-16    TPro  7.1  /  Alb  3.7  /  TBili  0.6  /  DBili  x   /  AST  9<L>  /  ALT  <5<L>  /  AlkPhos  59  04-16    Creatinine Trend: 0.93<--, 0.94<--, 0.93<--    Urinalysis Basic - ( 16 Apr 2024 08:25 )    Color: x / Appearance: x / SG: x / pH: x  Gluc: 98 mg/dL / Ketone: x  / Bili: x / Urobili: x   Blood: x / Protein: x / Nitrite: x   Leuk Esterase: x / RBC: x / WBC x   Sq Epi: x / Non Sq Epi: x / Bacteria: x            MICROBIOLOGY:     RADIOLOGY:  [x] Reviewed and interpreted by me

## 2024-04-16 NOTE — CONSULT NOTE ADULT - SUBJECTIVE AND OBJECTIVE BOX
NEPHROLOGY - NSN    Patient seen and examined.    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 The patient states she usually has her remodulin pump placed on RLQ and last week developed pain and swelling to that area, and changed her pump site to LLQ. Since then, she started to develop worsening abdominal pian and swelling to the RLQ and went to see her pulmonologist and PCP Dr. Yoon who recommended she present to the ED for evaluation of the abscess. The patient denies any fevers, chills, nausea, vomiting, worsening dyspnea on exertion.     Afebrile, VSS. Labs notable for WBC 13.33 w neutrophilic predominance, K 3.2, Lactate wnl, UA neg  CXR: Unchanged enlarged pulmonary arteries .L chest wall pacemaker, No focal consolidation.  CT A/P:  New 2.5 x 3.0 cm left anterior abdominal wall subcutaneous walled off collection, and smaller, approximate 1.2 cm right anterior abdominal wall subcutaneous collection is noted with associated skin thickening (also previously noted).     In the ED, her abscess was drained and cultures were sent and patient admitted for further management, s/p 1 dose of cefazolin and doxy  (14 Apr 2024 19:59)  Labs today with low phos  Dr Yoon called for electrolytes abnormalities and for volume status management   There is no hematuria or bubbles in the urine.  No history of NSAIDS or nephrolithisis.  The patient urinates once or twice in the night and there is no incontinence.  No family hx or renal disease or back pain.    No recent abx use.  No alleviating or aggravating factors with respect to the kidneys.     PAST MEDICAL & SURGICAL HISTORY:  Tachycardia      Anemia      Pulmonary hypertension      Pulmonary embolism      Asthma  On home O2 nightly at 2L via NC      PE (pulmonary thromboembolism)  on Xarelto 10 mg daily      Sinusitis      Corneal disorder      Right heart failure      CAD (coronary artery disease)      H/O pulmonary hypertension      Pulmonary embolism      Asthma      History of tachycardia      On supplemental oxygen by nasal cannula  O2 @ 2L      Pacemaker      Skin mass  left upper thigh mass      History of tubal ligation      Pacemaker      H/O tubal ligation      History of pacemaker  12/19 - [employer] model [employer] [id number]          MEDICATIONS  (STANDING):  chlorhexidine 2% Cloths 1 Application(s) Topical daily  doxycycline IVPB 100 milliGRAM(s) IV Intermittent every 12 hours  furosemide    Tablet 20 milliGRAM(s) Oral <User Schedule>  ipratropium    for Nebulization 500 MICROGram(s) Nebulizer every 6 hours  mupirocin 2% Nasal 1 Application(s) Both Nostrils two times a day  pantoprazole    Tablet 40 milliGRAM(s) Oral before breakfast  predniSONE   Tablet 10 milliGRAM(s) Oral daily  Remodulin (treprostinil) SubQ Infusion 1 Dose(s) 1 Dose(s) SubCutaneous Continuous Pump  rivaroxaban 20 milliGRAM(s) Oral with dinner  sildenafil (REVATIO) 20 milliGRAM(s) Oral every 8 hours  spironolactone 25 milliGRAM(s) Oral daily      Allergies    penicillin (Rash)  adhesives (Rash)  vancomycin (Rash)    Intolerances    albuterol (Other; Rash)      SOCIAL HISTORY:  Denies alcohol abuse, drug abuse or tobacco usage.     FAMILY HISTORY:  Family history of diabetes mellitus  in brother    Family history of diabetes mellitus in mother    FH: anemia        VITALS:  T(C): 36.6 (04-16-24 @ 08:45), Max: 36.7 (04-15-24 @ 17:38)  HR: 111 (04-16-24 @ 08:45) (100 - 114)  BP: 93/65 (04-16-24 @ 08:45) (91/63 - 122/83)  RR: 18 (04-16-24 @ 08:45) (18 - 19)  SpO2: 96% (04-16-24 @ 08:45) (92% - 97%)    REVIEW OF SYSTEMS:  Denies any nausea, vomiting, diarrhea, fever or chills.  Good oral intake and denies fatigue or weakness. All other pertinent systems are reviewed and are negative.    PHYSICAL EXAM:  Constitutional: NAD  HEENT: EOMI  Neck:  No JVD, supple   Respiratory: reduced   Cardiovascular: S1 and S2, RRR  Gastrointestinal: + BS, soft, NT, ND  Extremities: No peripheral edema, + peripheral pulses  Neurological: A/O x 3, CN2-12 intact  Psychiatric: Normal mood, normal affect  : No Cutler  Skin: No rashes, C/D/I  Access: Not applicable    I and O's:        LABS:                        12.2   10.18 )-----------( 346      ( 16 Apr 2024 08:25 )             40.6     04-16    138  |  106  |  8   ----------------------------<  98  3.6   |  21<L>  |  0.93    Ca    8.6      16 Apr 2024 08:25  Phos  2.9     04-16  Mg     1.8     04-16    TPro  7.1  /  Alb  3.7  /  TBili  0.6  /  DBili  x   /  AST  9<L>  /  ALT  <5<L>  /  AlkPhos  59  04-16      URINE:  Urinalysis Basic - ( 16 Apr 2024 08:25 )    Color: x / Appearance: x / SG: x / pH: x  Gluc: 98 mg/dL / Ketone: x  / Bili: x / Urobili: x   Blood: x / Protein: x / Nitrite: x   Leuk Esterase: x / RBC: x / WBC x   Sq Epi: x / Non Sq Epi: x / Bacteria: x        RADIOLOGY & ADDITIONAL STUDIES:    < from: CT Abdomen and Pelvis w/ IV Cont (04.14.24 @ 16:46) >    ACC: [id number] EXAM:  CT ABDOMEN AND PELVIS IC   ORDERED BY: SHAGGY BELLAMY     PROCEDURE DATE:  04/14/2024          INTERPRETATION:  CLINICAL INFORMATION: Left-sided abdominal swelling    COMPARISON: 1/3/2023    CONTRAST/COMPLICATIONS:  IV Contrast: Omnipaque 350  90 cc administered   10 cc discarded  Oral Contrast: NONE  Complications: None reported at time of study completion    PROCEDURE:  CT of the Abdomen and Pelvis was performed.  Sagittal and coronal reformats were performed.    FINDINGS:  LOWER CHEST: Within normal limits.    LIVER: Within normal limits.  BILE DUCTS: Normal caliber.  GALLBLADDER: Within normal limits.  SPLEEN: Stable 1.2 cm hypodensity.  PANCREAS: Within normal limits.  ADRENALS: Within normal limits.  KIDNEYS/URETERS: Symmetric enhancement. No collecting system obstruction   bilaterally.    BLADDER: Within normal limits.  REPRODUCTIVE ORGANS: An IUD is noted.    BOWEL: No bowel obstruction. Appendix is normal.  PERITONEUM: No ascites.  VESSELS: Within normallimits.  RETROPERITONEUM/LYMPH NODES: No lymphadenopathy.  ABDOMINAL WALL: A new, 2.5 x 3.0 cm left anterior abdominal wall   subcutaneous walled off collection is noted with adjacent skin   thickening. A smaller, approximate 1.2 cm right anterior abdominal wall   subcutaneous collection is noted with associated skin thickening. This is   in the approximate location of a previously noted right anterior   abdominal wall subcutaneous abscess.  BONES: Degenerative changes.    IMPRESSION:  1.  A new,3.0 cm left anterior abdominal wall subcutaneous collection   noted, compatible with an abscess.  2.  Near complete resolution not complete resolution of a previously   noted right anterior abdominal wall collection/abscess.

## 2024-04-16 NOTE — PROGRESS NOTE ADULT - PROBLEM SELECTOR PLAN 1
Afebrile, VSS. Pt with history of prior abdominal wall abscesses iso Rimodulin pump, now with a new abscess CT A/P showing new 2.5 x 3 cm collection in L anterior abdominal wall s/p I&D in ED  Prior wound cx Jan 2023: MRSA staph lugdunensis, sensitive in the past to cefazolin and doxy  s/p cefazolin and doxy in ED    Plan:  -c/w doxycycline 100 mg BID  -f/u MRSA swab  -f/u abdominal abscess wound culture  -f/u blood cx, urine cx Afebrile, VSS. Pt with history of prior abdominal wall abscesses iso Rimodulin pump, now with a new abscess CT A/P showing new 2.5 x 3 cm collection in L anterior abdominal wall s/p I&D in ED  Prior wound cx Jan 2023: MRSA staph lugdunensis, sensitive in the past to cefazolin and doxy  s/p cefazolin and doxy in ED    Plan:  -c/w doxycycline 100 mg BID  -MRSA positive in nares, decolonize with  bactroban   -abdominal abscess wound culture with moderate staph aureus   -blood culture anaerobic bottle growing staph epidermis, repeat blood culture pending  - urine culture NGTD   - pt remains afebrile and without leukocytosis

## 2024-04-16 NOTE — PROGRESS NOTE ADULT - SUBJECTIVE AND OBJECTIVE BOX
PROGRESS NOTE:   Authored by Dr. Fani Lopez MD (PGY-1). Pager [facility] [phone] / [place] ( [phone]) or via TEAMS    Patient is a 43y old  Female who presents with a chief complaint of Abdominal wall abscess (15 Apr 2024 17:09)      SUBJECTIVE / OVERNIGHT EVENTS:  No acute events overnight.     ADDITIONAL REVIEW OF SYSTEMS:  Patient denies fevers, chills, chest pain, shortness of breath, nausea, abdominal pain, diarrhea, constipation, dysuria, leg swelling, headache, light headedness.    MEDICATIONS  (STANDING):  chlorhexidine 2% Cloths 1 Application(s) Topical daily  doxycycline IVPB 100 milliGRAM(s) IV Intermittent every 12 hours  furosemide    Tablet 20 milliGRAM(s) Oral <User Schedule>  ipratropium    for Nebulization 500 MICROGram(s) Nebulizer every 6 hours  mupirocin 2% Nasal 1 Application(s) Both Nostrils two times a day  pantoprazole    Tablet 40 milliGRAM(s) Oral before breakfast  predniSONE   Tablet 10 milliGRAM(s) Oral daily  Remodulin (treprostinil) SubQ Infusion 1 Dose(s) 1 Dose(s) SubCutaneous Continuous Pump  rivaroxaban 20 milliGRAM(s) Oral with dinner  sildenafil (REVATIO) 20 milliGRAM(s) Oral every 8 hours  spironolactone 25 milliGRAM(s) Oral daily    MEDICATIONS  (PRN):      CAPILLARY BLOOD GLUCOSE        I&O's Summary      PHYSICAL EXAM:  Vital Signs Last 24 Hrs  T(C): 36.7 (16 Apr 2024 04:38), Max: 36.7 (15 Apr 2024 17:38)  T(F): 98.1 (16 Apr 2024 04:38), Max: 98.1 (15 Apr 2024 20:26)  HR: 112 (16 Apr 2024 04:38) (100 - 114)  BP: 122/83 (16 Apr 2024 04:38) (91/63 - 122/83)  BP(mean): --  RR: 18 (16 Apr 2024 04:38) (18 - 19)  SpO2: 93% (16 Apr 2024 04:38) (92% - 97%)    Parameters below as of 16 Apr 2024 04:38  Patient On (Oxygen Delivery Method): nasal cannula  O2 Flow (L/min): 2      CONSTITUTIONAL: NAD, well-developed  RESPIRATORY: Normal respiratory effort; lungs are clear to auscultation bilaterally  CARDIOVASCULAR: Regular rate and rhythm, normal S1 and S2, no murmur/rub/gallop; No lower extremity edema; Peripheral pulses are 2+ bilaterally  ABDOMEN: Nontender to palpation, normoactive bowel sounds, no rebound/guarding; No hepatosplenomegaly  MSK: no clubbing or cyanosis of digits; no joint swelling or tenderness to palpation  PSYCH: A+O to person, place, and time; affect appropriate    LABS:                        12.7   11.24 )-----------( 353      ( 15 Apr 2024 10:56 )             40.4     04-15    136  |  104  |  8   ----------------------------<  123<H>  4.4   |  19<L>  |  0.94    Ca    8.8      15 Apr 2024 10:56  Phos  2.1     04-15  Mg     1.9     04-15    TPro  7.4  /  Alb  3.9  /  TBili  0.9  /  DBili  x   /  AST  10  /  ALT  6<L>  /  AlkPhos  62  04-15    PT/INR - ( 14 Apr 2024 15:20 )   PT: 12.6 sec;   INR: 1.21 ratio         PTT - ( 14 Apr 2024 15:20 )  PTT:38.0 sec      Urinalysis Basic - ( 15 Apr 2024 10:56 )    Color: x / Appearance: x / SG: x / pH: x  Gluc: 123 mg/dL / Ketone: x  / Bili: x / Urobili: x   Blood: x / Protein: x / Nitrite: x   Leuk Esterase: x / RBC: x / WBC x   Sq Epi: x / Non Sq Epi: x / Bacteria: x        Culture - Abscess with Gram Stain (collected 14 Apr 2024 19:52)  Source: .Abscess left abdominal wall  Gram Stain (15 Apr 2024 02:28):    Few polymorphonuclear leukocytes seen per low power field    Few Gram positive cocci in pairs seen per oil power field  Preliminary Report (15 Apr 2024 18:07):    Moderate Staphylococcus aureus    Culture - Blood (collected 14 Apr 2024 15:15)  Source: .Blood Blood-Peripheral  Preliminary Report (15 Apr 2024 19:02):    No growth at 24 hours    Culture - Blood (collected 14 Apr 2024 15:00)  Source: .Blood Blood-Peripheral  Gram Stain (16 Apr 2024 03:19):    Growth in anaerobic bottle: Gram Positive Cocci in Clusters  Preliminary Report (16 Apr 2024 03:19):    Growth in anaerobic bottle: Gram Positive Cocci in Clusters    Direct identification is available within approximately 3-5    hours either by Blood Panel Multiplexed PCR or Direct    MALDI-TOF. Details: [email or web address]  Organism: Blood Culture PCR (16 Apr 2024 05:20)  Organism: Blood Culture PCR (16 Apr 2024 05:20)    Culture - Urine (collected 14 Apr 2024 14:18)  Source: Clean Catch Clean Catch (Midstream)  Final Report (15 Apr 2024 21:52):    <10,000 CFU/mL Normal Urogenital Laura        Tele Reviewed:    RADIOLOGY & ADDITIONAL TESTS:  Results Reviewed:   Imaging Personally Reviewed:  Electrocardiogram Personally Reviewed:     PROGRESS NOTE:   Authored by Dr. Fani Lopez MD (PGY-1). Pager [facility] [phone] / [place] ( [phone]) or via TEAMS    Patient is a 43y old  Female who presents with a chief complaint of Abdominal wall abscess (15 Apr 2024 17:09)      SUBJECTIVE / OVERNIGHT EVENTS:  No acute events overnight. States her night was 'not good' and felt short of breath however improved with duonebs, thinks its her asthma flaring up. Still noting abdominal pain, packing needs to be exchanged today.         MEDICATIONS  (STANDING):  chlorhexidine 2% Cloths 1 Application(s) Topical daily  doxycycline IVPB 100 milliGRAM(s) IV Intermittent every 12 hours  furosemide    Tablet 20 milliGRAM(s) Oral <User Schedule>  ipratropium    for Nebulization 500 MICROGram(s) Nebulizer every 6 hours  mupirocin 2% Nasal 1 Application(s) Both Nostrils two times a day  pantoprazole    Tablet 40 milliGRAM(s) Oral before breakfast  predniSONE   Tablet 10 milliGRAM(s) Oral daily  Remodulin (treprostinil) SubQ Infusion 1 Dose(s) 1 Dose(s) SubCutaneous Continuous Pump  rivaroxaban 20 milliGRAM(s) Oral with dinner  sildenafil (REVATIO) 20 milliGRAM(s) Oral every 8 hours  spironolactone 25 milliGRAM(s) Oral daily    MEDICATIONS  (PRN):      CAPILLARY BLOOD GLUCOSE        I&O's Summary      PHYSICAL EXAM:  Vital Signs Last 24 Hrs  T(C): 36.7 (16 Apr 2024 04:38), Max: 36.7 (15 Apr 2024 17:38)  T(F): 98.1 (16 Apr 2024 04:38), Max: 98.1 (15 Apr 2024 20:26)  HR: 112 (16 Apr 2024 04:38) (100 - 114)  BP: 122/83 (16 Apr 2024 04:38) (91/63 - 122/83)  BP(mean): --  RR: 18 (16 Apr 2024 04:38) (18 - 19)  SpO2: 93% (16 Apr 2024 04:38) (92% - 97%)    Parameters below as of 16 Apr 2024 04:38  Patient On (Oxygen Delivery Method): nasal cannula  O2 Flow (L/min): 2      CONSTITUTIONAL: NAD, well-developed  RESPIRATORY: Normal respiratory effort; lungs are clear to auscultation bilaterally  CARDIOVASCULAR: Regular rate and rhythm, normal S1 and S2, no murmur/rub/gallop; No lower extremity edema; Peripheral pulses are 2+ bilaterally  ABDOMEN: LLQ packing in place, no increased warmth or fluctuance. Nontender to palpation, normoactive bowel sounds, no rebound/guarding; No hepatosplenomegaly  MSK: no clubbing or cyanosis of digits; no joint swelling or tenderness to palpation  PSYCH: A+O to person, place, and time; affect appropriate    LABS:                        12.7   11.24 )-----------( 353      ( 15 Apr 2024 10:56 )             40.4     04-15    136  |  104  |  8   ----------------------------<  123<H>  4.4   |  19<L>  |  0.94    Ca    8.8      15 Apr 2024 10:56  Phos  2.1     04-15  Mg     1.9     04-15    TPro  7.4  /  Alb  3.9  /  TBili  0.9  /  DBili  x   /  AST  10  /  ALT  6<L>  /  AlkPhos  62  04-15    PT/INR - ( 14 Apr 2024 15:20 )   PT: 12.6 sec;   INR: 1.21 ratio         PTT - ( 14 Apr 2024 15:20 )  PTT:38.0 sec      Urinalysis Basic - ( 15 Apr 2024 10:56 )    Color: x / Appearance: x / SG: x / pH: x  Gluc: 123 mg/dL / Ketone: x  / Bili: x / Urobili: x   Blood: x / Protein: x / Nitrite: x   Leuk Esterase: x / RBC: x / WBC x   Sq Epi: x / Non Sq Epi: x / Bacteria: x        Culture - Abscess with Gram Stain (collected 14 Apr 2024 19:52)  Source: .Abscess left abdominal wall  Gram Stain (15 Apr 2024 02:28):    Few polymorphonuclear leukocytes seen per low power field    Few Gram positive cocci in pairs seen per oil power field  Preliminary Report (15 Apr 2024 18:07):    Moderate Staphylococcus aureus    Culture - Blood (collected 14 Apr 2024 15:15)  Source: .Blood Blood-Peripheral  Preliminary Report (15 Apr 2024 19:02):    No growth at 24 hours    Culture - Blood (collected 14 Apr 2024 15:00)  Source: .Blood Blood-Peripheral  Gram Stain (16 Apr 2024 03:19):    Growth in anaerobic bottle: Gram Positive Cocci in Clusters  Preliminary Report (16 Apr 2024 03:19):    Growth in anaerobic bottle: Gram Positive Cocci in Clusters    Direct identification is available within approximately 3-5    hours either by Blood Panel Multiplexed PCR or Direct    MALDI-TOF. Details: [email or web address]  Organism: Blood Culture PCR (16 Apr 2024 05:20)  Organism: Blood Culture PCR (16 Apr 2024 05:20)    Culture - Urine (collected 14 Apr 2024 14:18)  Source: Clean Catch Clean Catch (Midstream)  Final Report (15 Apr 2024 21:52):    <10,000 CFU/mL Normal Urogenital Laura        Tele Reviewed:    RADIOLOGY & ADDITIONAL TESTS:  Results Reviewed:   Imaging Personally Reviewed:  Electrocardiogram Personally Reviewed:

## 2024-04-17 ENCOUNTER — TRANSCRIPTION ENCOUNTER (OUTPATIENT)
Age: 44
End: 2024-04-17

## 2024-04-17 LAB
CULTURE RESULTS: ABNORMAL
ORGANISM # SPEC MICROSCOPIC CNT: ABNORMAL
ORGANISM # SPEC MICROSCOPIC CNT: ABNORMAL
SPECIMEN SOURCE: SIGNIFICANT CHANGE UP

## 2024-04-17 PROCEDURE — 99232 SBSQ HOSP IP/OBS MODERATE 35: CPT | Mod: GC

## 2024-04-17 PROCEDURE — 99232 SBSQ HOSP IP/OBS MODERATE 35: CPT

## 2024-04-17 PROCEDURE — 99222 1ST HOSP IP/OBS MODERATE 55: CPT

## 2024-04-17 RX ORDER — SPIRONOLACTONE 25 MG/1
2 TABLET, FILM COATED ORAL
Qty: 0 | Refills: 0 | DISCHARGE
Start: 2024-04-17

## 2024-04-17 RX ORDER — SPIRONOLACTONE 25 MG/1
1 TABLET, FILM COATED ORAL
Refills: 0 | DISCHARGE

## 2024-04-17 RX ADMIN — Medication 10 MILLIGRAM(S): at 05:50

## 2024-04-17 RX ADMIN — Medication 100 MILLIGRAM(S): at 17:17

## 2024-04-17 RX ADMIN — Medication 500 MICROGRAM(S): at 23:02

## 2024-04-17 RX ADMIN — SPIRONOLACTONE 50 MILLIGRAM(S): 25 TABLET, FILM COATED ORAL at 05:52

## 2024-04-17 RX ADMIN — Medication 500 MICROGRAM(S): at 12:32

## 2024-04-17 RX ADMIN — Medication 500 MICROGRAM(S): at 05:50

## 2024-04-17 RX ADMIN — Medication 20 MILLIGRAM(S): at 14:06

## 2024-04-17 RX ADMIN — Medication 100 MILLIGRAM(S): at 09:12

## 2024-04-17 RX ADMIN — Medication 500 MICROGRAM(S): at 17:17

## 2024-04-17 RX ADMIN — PANTOPRAZOLE SODIUM 40 MILLIGRAM(S): 20 TABLET, DELAYED RELEASE ORAL at 05:50

## 2024-04-17 RX ADMIN — Medication 20 MILLIGRAM(S): at 21:33

## 2024-04-17 RX ADMIN — Medication 20 MILLIGRAM(S): at 12:44

## 2024-04-17 RX ADMIN — CHLORHEXIDINE GLUCONATE 1 APPLICATION(S): 213 SOLUTION TOPICAL at 14:06

## 2024-04-17 RX ADMIN — RIVAROXABAN 20 MILLIGRAM(S): KIT at 17:17

## 2024-04-17 RX ADMIN — Medication 20 MILLIGRAM(S): at 05:52

## 2024-04-17 NOTE — PROGRESS NOTE ADULT - SUBJECTIVE AND OBJECTIVE BOX
Interval Events: feels well, wound cx +MRSA    PHYSICAL EXAM:  General: nad  HEENT: MMM, PERRLA  Respiratory: ctab, on RA  Cardiovascular: RRR, no MRG  Abdomen: mild tenderness in lower quadrants bilat  Extremities: no LE edema  Neurological: AOx3, no gross deficits    REVIEW OF SYSTEMS:  All systems negative unless described below:    OBJECTIVE:  ICU Vital Signs Last 24 Hrs  T(C): 36.3 (17 Apr 2024 15:52), Max: 36.3 (17 Apr 2024 08:55)  T(F): 97.4 (17 Apr 2024 15:52), Max: 97.4 (17 Apr 2024 15:52)  HR: 97 (17 Apr 2024 15:52) (92 - 110)  BP: 119/75 (17 Apr 2024 15:52) (97/66 - 119/75)  BP(mean): --  ABP: --  ABP(mean): --  RR: 18 (17 Apr 2024 15:52) (17 - 18)  SpO2: 97% (17 Apr 2024 15:52) (95% - 97%)    O2 Parameters below as of 17 Apr 2024 15:52  Patient On (Oxygen Delivery Method): nasal cannula  O2 Flow (L/min): 2            CAPILLARY BLOOD GLUCOSE              HOSPITAL MEDICATIONS:  rivaroxaban 20 milliGRAM(s) Oral with dinner    doxycycline monohydrate Capsule 100 milliGRAM(s) Oral every 12 hours    furosemide    Tablet 20 milliGRAM(s) Oral <User Schedule>  sildenafil (REVATIO) 20 milliGRAM(s) Oral every 8 hours  spironolactone 50 milliGRAM(s) Oral daily    predniSONE   Tablet 10 milliGRAM(s) Oral daily    ipratropium    for Nebulization 500 MICROGram(s) Nebulizer every 6 hours      pantoprazole    Tablet 40 milliGRAM(s) Oral before breakfast            chlorhexidine 2% Cloths 1 Application(s) Topical daily  mupirocin 2% Nasal 1 Application(s) Both Nostrils two times a day    Remodulin (treprostinil) SubQ Infusion 1 Dose(s) 1 Dose(s) SubCutaneous Continuous Pump      LABS:                        12.2   10.18 )-----------( 346      ( 16 Apr 2024 08:25 )             40.6     Hgb Trend: 12.2<--, 12.7<--, 13.0<--  04-16    138  |  106  |  8   ----------------------------<  98  3.6   |  21<L>  |  0.93    Ca    8.6      16 Apr 2024 08:25  Phos  2.9     04-16  Mg     1.8     04-16    TPro  7.1  /  Alb  3.7  /  TBili  0.6  /  DBili  x   /  AST  9<L>  /  ALT  <5<L>  /  AlkPhos  59  04-16    Creatinine Trend: 0.93<--, 0.94<--, 0.93<--    Urinalysis Basic - ( 16 Apr 2024 08:25 )    Color: x / Appearance: x / SG: x / pH: x  Gluc: 98 mg/dL / Ketone: x  / Bili: x / Urobili: x   Blood: x / Protein: x / Nitrite: x   Leuk Esterase: x / RBC: x / WBC x   Sq Epi: x / Non Sq Epi: x / Bacteria: x            MICROBIOLOGY:     RADIOLOGY:  [x] Reviewed and interpreted by me

## 2024-04-17 NOTE — CONSULT NOTE ADULT - ATTENDING COMMENTS
Pt seen and examined. Agree with A/P. Abscess opened and packed. On abx. No additional surgical intervention at this time.

## 2024-04-17 NOTE — DISCHARGE NOTE PROVIDER - PROVIDER TOKENS
PROVIDER:[TOKEN:[[id number]],FOLLOWUP:[2 weeks]],PROVIDER:[TOKEN:[[id number]],FOLLOWUP:[2 weeks]] PROVIDER:[TOKEN:[[id number]],FOLLOWUP:[2 weeks]],PROVIDER:[TOKEN:[[id number]],FOLLOWUP:[2 weeks]],FREE:[LAST:[WoundCare Team],PHONE:[(   )    -],FAX:[(   )    -],ADDRESS:[Wound Center [address] [place] [address] [phone]],FOLLOWUP:[1 week]]

## 2024-04-17 NOTE — CONSULT NOTE ADULT - REASON FOR ADMISSION
Abdominal wall abscess

## 2024-04-17 NOTE — PROGRESS NOTE ADULT - ATTENDING COMMENTS
Agree with above  43F PMH pulmonary hypertension (Group I and IV on Remodulin / Xarelto / home O2 2LNC), H/O PE, multiple abdominal wall abscesses admitted for I&D of abdominal abscess.  - c/w Remodulin via pump  - administer oxygen as needed to maintain spO2 >94%  - c/w Xarelto  - Now s/p I&D abscess; MRSA+ on doxycycline.  - One blood culture positive Staph epidermidis, repeat culture as likely contaminant (but given SSTI, not entirely ruled out)  - Hemodynamically stable, c/w sildenafil 20mg TID.  - c/w prednisone at home dose  - No respiratory complaints at present.  - We will continue to follow the patient with you.

## 2024-04-17 NOTE — PROGRESS NOTE ADULT - TIME BILLING
Reviewed images and labs. Clinical decision making, changes in medication regimen. Discussion with primary team, and patient. This excludes any time spent on separate procedures or teaching.
Reviewed images and labs. Clinical decision making, changes in medication regimen. Discussion with primary team, and patient. This excludes any time spent on separate procedures or teaching.

## 2024-04-17 NOTE — PROGRESS NOTE ADULT - PROBLEM SELECTOR PLAN 1
Afebrile, VSS. Pt with history of prior abdominal wall abscesses iso Rimodulin pump, now with a new abscess CT A/P showing new 2.5 x 3 cm collection in L anterior abdominal wall s/p I&D in ED  Prior wound cx Jan 2023: MRSA staph lugdunensis, sensitive in the past to cefazolin and doxy  s/p cefazolin and doxy in ED    Plan:  -c/w doxycycline 100 mg BID  -MRSA positive in nares, decolonize with  bactroban   -abdominal abscess wound culture with moderate staph aureus   -blood culture anaerobic bottle growing staph epidermis, repeat blood culture pending  - urine culture NGTD   - pt remains afebrile and without leukocytosis Afebrile, VSS. Pt with history of prior abdominal wall abscesses iso Rimodulin pump, now with a new abscess CT A/P showing new 2.5 x 3 cm collection in L anterior abdominal wall s/p I&D in ED  Prior wound cx Jan 2023: MRSA staph lugdunensis, sensitive in the past to cefazolin and doxy  s/p cefazolin and doxy in ED    Plan:  -c/w doxycycline  mg BID (ID consulted and recommendations appreciated)   -MRSA positive in nares, decolonize with  bactroban   -abdominal abscess wound culture with MRSA, sensitive to tetracyclines    - surgery consulted 4/17 for possible incomplete drainage/ improved site care   -blood culture anaerobic bottle growing staph epidermis, repeat blood culture pending  - urine culture NGTD   - pt remains afebrile and without leukocytosis

## 2024-04-17 NOTE — CONSULT NOTE ADULT - SUBJECTIVE AND OBJECTIVE BOX
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 The patient states she usually has her remodulin pump placed on RLQ and last week developed pain and swelling to that area, and changed her pump site to LLQ. Since then, she started to develop worsening abdominal pian and swelling to the RLQ and went to see her pulmonologist and PCP Dr. Yoon who recommended she present to the ED for evaluation of the abscess. The patient denies any fevers, chills, nausea, vomiting, worsening dyspnea on exertion.     Afebrile, VSS. Labs notable for WBC 13.33 w neutrophilic predominance, K 3.2, Lactate wnl, UA neg  CXR: Unchanged enlarged pulmonary arteries .L chest wall pacemaker, No focal consolidation.  CT A/P:  New 2.5 x 3.0 cm left anterior abdominal wall subcutaneous walled off collection, and smaller, approximate 1.2 cm right anterior abdominal wall subcutaneous collection is noted with associated skin thickening (also previously noted).     In the ED, her abscess was drained and cultures were sent and patient admitted for further management, s/p 1 dose of cefazolin and doxy  (14 Apr 2024 19:59)      Wound consult requested by team to assist w/ management of  abdominal RLQ  wound.   Pt w/o  c/o pain, drainage, odor, color change,  or worsening swelling. Offloading and pericare initiated upon admission as pt Increasingly sedentary 2/2 to illness. Pt is continent of urine & stool.  Appetite good. All questions asked and answered to pt's expressed understanding and satisfaction.    Current Diet: Diet, Regular (04-14-24 @ 23:35)      PAST MEDICAL & SURGICAL HISTORY:  Tachycardia      Anemia      Pulmonary hypertension      Pulmonary embolism      Asthma  On home O2 nightly at 2L via NC      PE (pulmonary thromboembolism)  on Xarelto 10 mg daily      Sinusitis      Corneal disorder      Right heart failure      CAD (coronary artery disease)      H/O pulmonary hypertension      Pulmonary embolism      Asthma      History of tachycardia      On supplemental oxygen by nasal cannula  O2 @ 2L      Pacemaker      Skin mass  left upper thigh mass      History of tubal ligation      Pacemaker      H/O tubal ligation      History of pacemaker  12/19 - [employer] model Ingevity 4469      REVIEW OF SYSTEMS:   General/ Breast/ Skin/Vasc/ Neuro/ MSK: see HPI  All other systems negative    MEDICATIONS  (STANDING):  chlorhexidine 2% Cloths 1 Application(s) Topical daily  doxycycline monohydrate Capsule 100 milliGRAM(s) Oral every 12 hours  furosemide    Tablet 20 milliGRAM(s) Oral <User Schedule>  ipratropium    for Nebulization 500 MICROGram(s) Nebulizer every 6 hours  mupirocin 2% Nasal 1 Application(s) Both Nostrils two times a day  pantoprazole    Tablet 40 milliGRAM(s) Oral before breakfast  predniSONE   Tablet 10 milliGRAM(s) Oral daily  Remodulin (treprostinil) SubQ Infusion 1 Dose(s) 1 Dose(s) SubCutaneous Continuous Pump  rivaroxaban 20 milliGRAM(s) Oral with dinner  sildenafil (REVATIO) 20 milliGRAM(s) Oral every 8 hours  spironolactone 50 milliGRAM(s) Oral daily    MEDICATIONS  (PRN):      Allergies    penicillin (Rash)  adhesives (Rash)  vancomycin (Rash)    Intolerances    albuterol (Other; Rash)      SOCIAL HISTORY:   Patient denies any history of smoking, alcohol use, or illicit drug use. Not currently working    FAMILY HISTORY:  Family history of diabetes mellitus  in brother    Family history of diabetes mellitus in mother    FH: anemia      PHYSICAL EXAM:  Vital Signs Last 24 Hrs  T(C): 36.3 (17 Apr 2024 15:52), Max: 36.3 (17 Apr 2024 08:55)  T(F): 97.4 (17 Apr 2024 15:52), Max: 97.4 (17 Apr 2024 15:52)  HR: 97 (17 Apr 2024 15:52) (92 - 110)  BP: 119/75 (17 Apr 2024 15:52) (97/66 - 119/75)  BP(mean): --  RR: 18 (17 Apr 2024 15:52) (17 - 18)  SpO2: 97% (17 Apr 2024 15:52) (95% - 97%)    Parameters below as of 17 Apr 2024 15:52  Patient On (Oxygen Delivery Method): nasal cannula  O2 Flow (L/min): 2      NAD,  A&Ox3/ Obese  WD/ WN/ Versa Care P500   HENT:   mucosa moist, throat clear, trachea midline, neck supple  Opthalmology sclera clear,  Respiratory: nonlabored w/ equal chest rise  Gastrointestinal: soft NT/ND  Neurology:  verbal, follows commands  Psych: calm/ appropriate  Musculoskeletal: , no deformities/ contractures  Vascular: BLE equally warm, no cyanosis, clubbing, edema nor acute ischemia               Left thigh conjugated lipoma                BLE DP pulses palpable  Skin:  moist w/ good turgor  Abdomen  0.5cm x 0.5cm x 1.5cm scant serosanguinous drainage, (+) minor induration, there is no odor       no blistering, no increased warmth, fluctuance, nor crepitus          LABS/ CULTURES/ RADIOLOGY:                        12.2   10.18 )-----------( 346      ( 16 Apr 2024 08:25 )             40.6       138  |  106  |  8   ----------------------------<  98      [04-16-24 @ 08:25]  3.6   |  21  |  0.93        Ca     8.6     [04-16-24 @ 08:25]      Mg     1.8     [04-16-24 @ 08:25]      Phos  2.9     [04-16-24 @ 08:25]    TPro  7.1  /  Alb  3.7  /  TBili  0.6  /  DBili  x   /  AST  9   /  ALT  <5  /  AlkPhos  59  [04-16-24 @ 08:25]                  Culture - Blood (collected 04-16-24 @ 07:55)  Source: .Blood Blood-Peripheral  Preliminary Report (04-17-24 @ 13:02):    No growth at 24 hours    Culture - Blood (collected 04-16-24 @ 07:45)  Source: .Blood Blood-Peripheral  Preliminary Report (04-17-24 @ 13:02):    No growth at 24 hours    Culture - Blood (collected 04-14-24 @ 15:15)  Source: .Blood Blood-Peripheral  Preliminary Report (04-16-24 @ 19:02):    No growth at 48 Hours    Culture - Blood (collected 04-14-24 @ 15:00)  Source: .Blood Blood-Peripheral  Gram Stain (04-16-24 @ 03:19):    Growth in anaerobic bottle: Gram Positive Cocci in Clusters  Final Report (04-17-24 @ 12:08):    Growth in anaerobic bottle: Staphylococcus epidermidis Isolation of    Coagulase negative Staphylococcus from single blood culture sets may    represent    contamination. Contact the Microbiology Department at [phone] if    susceptibility testing is    clinically indicated.    Direct identification is available within approximately 3-5    hours either by Blood Panel Multiplexed PCR or Direct    MALDI-TOF. 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 The patient states she usually has her remodulin pump placed on RLQ and last week developed pain and swelling to that area, and changed her pump site to LLQ. Since then, she started to develop worsening abdominal pian and swelling to the RLQ and went to see her pulmonologist and PCP Dr. Yoon who recommended she present to the ED for evaluation of the abscess. The patient denies any fevers, chills, nausea, vomiting, worsening dyspnea on exertion.     Afebrile, VSS. Labs notable for WBC 13.33 w neutrophilic predominance, K 3.2, Lactate wnl, UA neg  CXR: Unchanged enlarged pulmonary arteries .L chest wall pacemaker, No focal consolidation.  CT A/P:  New 2.5 x 3.0 cm left anterior abdominal wall subcutaneous walled off collection, and smaller, approximate 1.2 cm right anterior abdominal wall subcutaneous collection is noted with associated skin thickening (also previously noted).     In the ED, her abscess was drained and cultures were sent and patient admitted for further management, s/p 1 dose of cefazolin and doxy  (14 Apr 2024 19:59)      Wound consult requested by team to assist w/ management of  abdominal RLQ  wound.   Pt w/o  c/o pain, drainage, odor, color change,  or worsening swelling. Offloading and pericare initiated upon admission as pt Increasingly sedentary 2/2 to illness. Pt is continent of urine & stool.  Appetite good. All questions asked and answered to pt's expressed understanding and satisfaction.    Current Diet: Diet, Regular (04-14-24 @ 23:35)      PAST MEDICAL & SURGICAL HISTORY:  Tachycardia      Anemia      Pulmonary hypertension      Pulmonary embolism      Asthma  On home O2 nightly at 2L via [place]      PE (pulmonary thromboembolism)  on Xarelto 10 mg daily      Sinusitis      Corneal disorder      Right heart failure      CAD (coronary artery disease)      H/O pulmonary hypertension      Pulmonary embolism      Asthma      History of tachycardia      On supplemental oxygen by nasal cannula  O2 @ 2L      Pacemaker      Skin mass  left upper thigh mass      History of tubal ligation      Pacemaker      H/O tubal ligation      History of pacemaker  12/19 - [employer] model Ingevity 4469      REVIEW OF SYSTEMS:   General/ Breast/ Skin/Vasc/ Neuro/ MSK: see HPI  All other systems negative    MEDICATIONS  (STANDING):  chlorhexidine 2% Cloths 1 Application(s) Topical daily  doxycycline monohydrate Capsule 100 milliGRAM(s) Oral every 12 hours  furosemide    Tablet 20 milliGRAM(s) Oral <User Schedule>  ipratropium    for Nebulization 500 MICROGram(s) Nebulizer every 6 hours  mupirocin 2% Nasal 1 Application(s) Both Nostrils two times a day  pantoprazole    Tablet 40 milliGRAM(s) Oral before breakfast  predniSONE   Tablet 10 milliGRAM(s) Oral daily  Remodulin (treprostinil) SubQ Infusion 1 Dose(s) 1 Dose(s) SubCutaneous Continuous Pump  rivaroxaban 20 milliGRAM(s) Oral with dinner  sildenafil (REVATIO) 20 milliGRAM(s) Oral every 8 hours  spironolactone 50 milliGRAM(s) Oral daily    MEDICATIONS  (PRN):      Allergies    penicillin (Rash)  adhesives (Rash)  vancomycin (Rash)    Intolerances    albuterol (Other; Rash)      SOCIAL HISTORY:   Patient denies any history of smoking, alcohol use, or illicit drug use. Currently not working    FAMILY HISTORY:  Family history of diabetes mellitus  in brother    Family history of diabetes mellitus in mother    FH: anemia      PHYSICAL EXAM:  Vital Signs Last 24 Hrs  T(C): 36.3 (17 Apr 2024 15:52), Max: 36.3 (17 Apr 2024 08:55)  T(F): 97.4 (17 Apr 2024 15:52), Max: 97.4 (17 Apr 2024 15:52)  HR: 97 (17 Apr 2024 15:52) (92 - 110)  BP: 119/75 (17 Apr 2024 15:52) (97/66 - 119/75)  BP(mean): --  RR: 18 (17 Apr 2024 15:52) (17 - 18)  SpO2: 97% (17 Apr 2024 15:52) (95% - 97%)    Parameters below as of 17 Apr 2024 15:52  Patient On (Oxygen Delivery Method): nasal cannula  O2 Flow (L/min): 2      NAD,  A&Ox3/ Obese  WD/ WN/ Versa Care P500   HENT:   mucosa moist, throat clear, trachea midline, neck supple  Opthalmology sclera clear,  Respiratory: nonlabored w/ equal chest rise  Gastrointestinal: soft NT/ND  Neurology:  verbal, follows commands  Psych: calm/ appropriate  Musculoskeletal: , no deformities/ contractures  Vascular: BLE equally warm, no cyanosis, clubbing, edema nor acute ischemia               Left thigh conjugated lipoma                BLE DP pulses palpable  Skin:  moist w/ good turgor  Abdomen RLQ wound 0.5cm x 0.5cm x 1.5cm scant serosanguinous drainage, (+) minor induration, there is no odor       no blistering, no increased warmth, fluctuance, nor crepitus          LABS/ CULTURES/ RADIOLOGY:                        12.2   10.18 )-----------( 346      ( 16 Apr 2024 08:25 )             40.6       138  |  106  |  8   ----------------------------<  98      [04-16-24 @ 08:25]  3.6   |  21  |  0.93        Ca     8.6     [04-16-24 @ 08:25]      Mg     1.8     [04-16-24 @ 08:25]      Phos  2.9     [04-16-24 @ 08:25]    TPro  7.1  /  Alb  3.7  /  TBili  0.6  /  DBili  x   /  AST  9   /  ALT  <5  /  AlkPhos  59  [04-16-24 @ 08:25]                  Culture - Blood (collected 04-16-24 @ 07:55)  Source: .Blood Blood-Peripheral  Preliminary Report (04-17-24 @ 13:02):    No growth at 24 hours    Culture - Blood (collected 04-16-24 @ 07:45)  Source: .Blood Blood-Peripheral  Preliminary Report (04-17-24 @ 13:02):    No growth at 24 hours    Culture - Blood (collected 04-14-24 @ 15:15)  Source: .Blood Blood-Peripheral  Preliminary Report (04-16-24 @ 19:02):    No growth at 48 Hours    Culture - Blood (collected 04-14-24 @ 15:00)  Source: .Blood Blood-Peripheral  Gram Stain (04-16-24 @ 03:19):    Growth in anaerobic bottle: Gram Positive Cocci in Clusters  Final Report (04-17-24 @ 12:08):    Growth in anaerobic bottle: Staphylococcus epidermidis Isolation of    Coagulase negative Staphylococcus from single blood culture sets may    represent    contamination. Contact the Microbiology Department at [phone] if    susceptibility testing is    clinically indicated.    Direct identification is available within approximately 3-5    hours either by Blood Panel Multiplexed PCR or Direct    MALDI-TOF. Details: [email or web address]  Organism: Blood Culture PCR (04-17-24 @ 12:08)  Organism: Blood Culture PCR (04-17-24 @ 12:08)      -  Staphylococcus epidermidis: Detec      Method Type: PCR        < from: CT Abdomen and Pelvis w/ IV Cont (04.14.24 @ 16:46) >    ACC: [id number] EXAM:  CT ABDOMEN AND PELVIS IC   ORDERED BY: SHAGGY BELLAMY     PROCEDURE DATE:  04/14/2024          INTERPRETATION:  CLINICAL INFORMATION: Left-sided abdominal swelling    COMPARISON: 1/3/2023    CONTRAST/COMPLICATIONS:  IV Contrast: Omnipaque 350  90 cc administered   10 cc discarded  Oral Contrast: NONE  Complications: None reported at time of study completion    PROCEDURE:  CT of the Abdomen and Pelvis was performed.  Sagittal and coronal reformats were performed.    FINDINGS:  LOWER CHEST: Within normal limits.    LIVER: Within normal limits.  BILE DUCTS: Normal caliber.  GALLBLADDER: Within normal limits.  SPLEEN: Stable 1.2 cm hypodensity.  PANCREAS: Within normal limits.  ADRENALS: Within normal limits.  KIDNEYS/URETERS: Symmetric enhancement. No collecting system obstruction   bilaterally.    BLADDER: Within normal limits.  REPRODUCTIVE ORGANS: An IUD is noted.    BOWEL: No bowel obstruction. Appendix is normal.  PERITONEUM: No ascites.  VESSELS: Within normallimits.  RETROPERITONEUM/LYMPH NODES: No lymphadenopathy.  ABDOMINAL WALL: A new, 2.5 x 3.0 cm left anterior abdominal wall   subcutaneous walled off collection is noted with adjacent skin   thickening. A smaller, approximate 1.2 cm right anterior abdominal wall   subcutaneous collection is noted with associated skin thickening. This is   in the approximate location of a previously noted right anterior   abdominal wall subcutaneous abscess.  BONES: Degenerative changes.    IMPRESSION:  1.  A new,3.0 cm left anterior abdominal wall subcutaneous collection   noted, compatible with an abscess.  2.  Near complete resolution not complete resolution of a previously   noted right anterior abdominal wall collection/abscess.        --- End of Report ---            GEOFFREY SIMMONS MD; Attending Radiologist  This document has been electronically signed. 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 The patient states she usually has her remodulin pump placed on RLQ and last week developed pain and swelling to that area, and changed her pump site to LLQ. Since then, she started to develop worsening abdominal pian and swelling to the RLQ and went to see her pulmonologist and PCP Dr. Yoon who recommended she present to the ED for evaluation of the abscess. The patient denies any fevers, chills, nausea, vomiting, worsening dyspnea on exertion.     Afebrile, VSS. Labs notable for WBC 13.33 w neutrophilic predominance, K 3.2, Lactate wnl, UA neg  CXR: Unchanged enlarged pulmonary arteries .L chest wall pacemaker, No focal consolidation.  CT A/P:  New 2.5 x 3.0 cm left anterior abdominal wall subcutaneous walled off collection, and smaller, approximate 1.2 cm right anterior abdominal wall subcutaneous collection is noted with associated skin thickening (also previously noted).     In the ED, her abscess was drained and cultures were sent and patient admitted for further management, s/p 1 dose of cefazolin and doxy  (14 Apr 2024 19:59)      Wound consult requested by team to assist w/ management of  abdominal RLQ  wound.   Pt w/o  c/o pain, drainage, odor, color change,  or worsening swelling. Offloading and pericare initiated upon admission as pt Increasingly sedentary 2/2 to illness. Pt is continent of urine & stool.  Appetite good. All questions asked and answered to pt's expressed understanding and satisfaction.    Current Diet: Diet, Regular (04-14-24 @ 23:35)      PAST MEDICAL & SURGICAL HISTORY:  Tachycardia      Anemia      Pulmonary hypertension      Pulmonary embolism      Asthma  On home O2 nightly at 2L via [place]      PE (pulmonary thromboembolism)  on Xarelto 10 mg daily      Sinusitis      Corneal disorder      Right heart failure      CAD (coronary artery disease)      H/O pulmonary hypertension      Pulmonary embolism      Asthma      History of tachycardia      On supplemental oxygen by nasal cannula  O2 @ 2L      Pacemaker      Skin mass  left upper thigh mass      History of tubal ligation      Pacemaker      H/O tubal ligation      History of pacemaker  12/19 - [employer] model Ingevity 4469      REVIEW OF SYSTEMS:   General/ Breast/ Skin/Vasc/ Neuro/ MSK: see HPI  All other systems negative    MEDICATIONS  (STANDING):  chlorhexidine 2% Cloths 1 Application(s) Topical daily  doxycycline monohydrate Capsule 100 milliGRAM(s) Oral every 12 hours  furosemide    Tablet 20 milliGRAM(s) Oral <User Schedule>  ipratropium    for Nebulization 500 MICROGram(s) Nebulizer every 6 hours  mupirocin 2% Nasal 1 Application(s) Both Nostrils two times a day  pantoprazole    Tablet 40 milliGRAM(s) Oral before breakfast  predniSONE   Tablet 10 milliGRAM(s) Oral daily  Remodulin (treprostinil) SubQ Infusion 1 Dose(s) 1 Dose(s) SubCutaneous Continuous Pump  rivaroxaban 20 milliGRAM(s) Oral with dinner  sildenafil (REVATIO) 20 milliGRAM(s) Oral every 8 hours  spironolactone 50 milliGRAM(s) Oral daily    MEDICATIONS  (PRN):      Allergies    penicillin (Rash)  adhesives (Rash)  vancomycin (Rash)    Intolerances    albuterol (Other; Rash)      SOCIAL HISTORY:   Patient denies any history of smoking, alcohol use, or illicit drug use. Currently not working    FAMILY HISTORY:  Family history of diabetes mellitus  in brother    Family history of diabetes mellitus in mother    FH: anemia      PHYSICAL EXAM:  Vital Signs Last 24 Hrs  T(C): 36.3 (17 Apr 2024 15:52), Max: 36.3 (17 Apr 2024 08:55)  T(F): 97.4 (17 Apr 2024 15:52), Max: 97.4 (17 Apr 2024 15:52)  HR: 97 (17 Apr 2024 15:52) (92 - 110)  BP: 119/75 (17 Apr 2024 15:52) (97/66 - 119/75)  BP(mean): --  RR: 18 (17 Apr 2024 15:52) (17 - 18)  SpO2: 97% (17 Apr 2024 15:52) (95% - 97%)    Parameters below as of 17 Apr 2024 15:52  Patient On (Oxygen Delivery Method): nasal cannula  O2 Flow (L/min): 2      NAD,  A&Ox3/ Obese  WD/ WN/ Versa Care P500   HENT:   mucosa moist, throat clear, trachea midline, neck supple  Opthalmology sclera clear,  Respiratory: nonlabored w/ equal chest rise  Gastrointestinal: soft NT/ND  Neurology:  verbal, follows commands  Psych: calm/ appropriate  Musculoskeletal: , no deformities/ contractures  Vascular: BLE equally warm, no cyanosis, clubbing, edema nor acute ischemia               Left thigh pedunculated soft flesh colored lesion.  Skin intact.  (appears to be c/w a  lipoma) No s/s of infection.  NT               BLE DP pulses palpable  Skin:  moist w/ good turgor  Abdomen wound: 0.5cm x 0.8cm x 1.5cm, undermining of 2cm at 9:00,  serosanguinous drainage, (+) minor induration, there is no odor       no blistering, no increased warmth, fluctuance, nor crepitus          LABS/ CULTURES/ RADIOLOGY:                        12.2   10.18 )-----------( 346      ( 16 Apr 2024 08:25 )             40.6       138  |  106  |  8   ----------------------------<  98      [04-16-24 @ 08:25]  3.6   |  21  |  0.93        Ca     8.6     [04-16-24 @ 08:25]      Mg     1.8     [04-16-24 @ 08:25]      Phos  2.9     [04-16-24 @ 08:25]    TPro  7.1  /  Alb  3.7  /  TBili  0.6  /  DBili  x   /  AST  9   /  ALT  <5  /  AlkPhos  59  [04-16-24 @ 08:25]                  Culture - Blood (collected 04-16-24 @ 07:55)  Source: .Blood Blood-Peripheral  Preliminary Report (04-17-24 @ 13:02):    No growth at 24 hours    Culture - Blood (collected 04-16-24 @ 07:45)  Source: .Blood Blood-Peripheral  Preliminary Report (04-17-24 @ 13:02):    No growth at 24 hours    Culture - Blood (collected 04-14-24 @ 15:15)  Source: .Blood Blood-Peripheral  Preliminary Report (04-16-24 @ 19:02):    No growth at 48 Hours    Culture - Blood (collected 04-14-24 @ 15:00)  Source: .Blood Blood-Peripheral  Gram Stain (04-16-24 @ 03:19):    Growth in anaerobic bottle: Gram Positive Cocci in Clusters  Final Report (04-17-24 @ 12:08):    Growth in anaerobic bottle: Staphylococcus epidermidis Isolation of    Coagulase negative Staphylococcus from single blood culture sets may    represent    contamination. Contact the Microbiology Department at [phone] if    susceptibility testing is    clinically indicated.    Direct identification is available within approximately 3-5    hours either by Blood Panel Multiplexed PCR or Direct    MALDI-TOF. Details: [email or web address]  Organism: Blood Culture PCR (04-17-24 @ 12:08)  Organism: Blood Culture PCR (04-17-24 @ 12:08)      -  Staphylococcus epidermidis: Detec      Method Type: PCR        < from: CT Abdomen and Pelvis w/ IV Cont (04.14.24 @ 16:46) >    ACC: [id number] EXAM:  CT ABDOMEN AND PELVIS IC   ORDERED BY: SHAGGY BELLAMY     PROCEDURE DATE:  04/14/2024          INTERPRETATION:  CLINICAL INFORMATION: Left-sided abdominal swelling    COMPARISON: 1/3/2023    CONTRAST/COMPLICATIONS:  IV Contrast: Omnipaque 350  90 cc administered   10 cc discarded  Oral Contrast: NONE  Complications: None reported at time of study completion    PROCEDURE:  CT of the Abdomen and Pelvis was performed.  Sagittal and coronal reformats were performed.    FINDINGS:  LOWER CHEST: Within normal limits.    LIVER: Within normal limits.  BILE DUCTS: Normal caliber.  GALLBLADDER: Within normal limits.  SPLEEN: Stable 1.2 cm hypodensity.  PANCREAS: Within normal limits.  ADRENALS: Within normal limits.  KIDNEYS/URETERS: Symmetric enhancement. No collecting system obstruction   bilaterally.    BLADDER: Within normal limits.  REPRODUCTIVE ORGANS: An IUD is noted.    BOWEL: No bowel obstruction. Appendix is normal.  PERITONEUM: No ascites.  VESSELS: Within normallimits.  RETROPERITONEUM/LYMPH NODES: No lymphadenopathy.  ABDOMINAL WALL: A new, 2.5 x 3.0 cm left anterior abdominal wall   subcutaneous walled off collection is noted with adjacent skin   thickening. A smaller, approximate 1.2 cm right anterior abdominal wall   subcutaneous collection is noted with associated skin thickening. This is   in the approximate location of a previously noted right anterior   abdominal wall subcutaneous abscess.  BONES: Degenerative changes.    IMPRESSION:  1.  A new,3.0 cm left anterior abdominal wall subcutaneous collection   noted, compatible with an abscess.  2.  Near complete resolution not complete resolution of a previously   noted right anterior abdominal wall collection/abscess.        --- End of Report ---            GEOFFREY SIMMONS MD; Attending Radiologist  This document has been electronically signed. Apr 14 2024  5:07PM    < end of copied text >

## 2024-04-17 NOTE — CONSULT NOTE ADULT - ASSESSMENT
43-yo F w/ PMH pHTN on Remodulin and Xarelto, RA thrombus s/p thrombectomy, PE, and recurrent abdominal abscesses s/p multiple I&D, AVNRT s/p ablation, bradycardia s/p PPM (2016), and asthma, presenting for abdominal wall abscess. Abscess to LLQ i/s/o Remodulin pump insertion to LLQ. S/p I/D by ED. Primary team and ID concern for inadequate drainage. Surgery consulted.     Abscess is adequately drained.     Plan:   -No surgical intervention  -Management per wound care  -Surgery to s/o in AM   -Care per primary team   -Reconsult PRN    Discussed with Attending Surgeon Dr. Joao Krishnamurthy MD PGY3  Trauma

## 2024-04-17 NOTE — CONSULT NOTE ADULT - SUBJECTIVE AND OBJECTIVE BOX
SURGERY CONSULT NOTE  --------------------------------------------------------------------------------------------    Patient is a 43y old  Female who presents with a chief complaint of Abdominal wall abscess (17 Apr 2024 16:50)      HPI: 43-yo F w/ PMH pHTN on Remodulin and Xarelto, RA thrombus s/p thrombectomy, PE, and recurrent abdominal abscesses s/p multiple I&D, AVNRT s/p ablation, bradycardia s/p PPM (2016), and asthma, presenting for abdominal wall abscess. Abscess to LLQ i/s/o Remodulin pump insertion to LLQ. S/p I/D by ED. Primary team and ID concern for inadequate drainage. Surgery consulted.     HDS, AF.     Seen and examined. Patient incision has not drained any fluid today, compared with yesterday. Denies fevers, chills, nausea, vomiting, CP, SOB, diarrhea, malodor from the LLQ wound.         PAST MEDICAL & SURGICAL HISTORY:  Tachycardia      Anemia      Pulmonary hypertension      Pulmonary embolism      Asthma  On home O2 nightly at 2L via NC      PE (pulmonary thromboembolism)  on Xarelto 10 mg daily      Sinusitis      Corneal disorder      Right heart failure      CAD (coronary artery disease)      H/O pulmonary hypertension      Pulmonary embolism      Asthma      History of tachycardia      On supplemental oxygen by nasal cannula  O2 @ 2L      Pacemaker      Skin mass  left upper thigh mass      History of tubal ligation      Pacemaker      H/O tubal ligation      History of pacemaker  12/19 - [employer] model Ingevity 4462        FAMILY HISTORY:  Family history of diabetes mellitus  in brother    Family history of diabetes mellitus in mother    FH: anemia      [] Family history not pertinent as reviewed with the patient and family    ALLERGIES: albuterol (Other; Rash)  penicillin (Rash)  adhesives (Rash)  vancomycin (Rash)    CURRENT MEDICATIONS  MEDICATIONS (STANDING): doxycycline monohydrate Capsule 100 milliGRAM(s) Oral every 12 hours  furosemide    Tablet 20 milliGRAM(s) Oral <User Schedule>  ipratropium    for Nebulization 500 MICROGram(s) Nebulizer every 6 hours  pantoprazole    Tablet 40 milliGRAM(s) Oral before breakfast  predniSONE   Tablet 10 milliGRAM(s) Oral daily  Remodulin (treprostinil) SubQ Infusion 1 Dose(s) 1 Dose(s) SubCutaneous Continuous Pump  rivaroxaban 20 milliGRAM(s) Oral with dinner  sildenafil (REVATIO) 20 milliGRAM(s) Oral every 8 hours  spironolactone 50 milliGRAM(s) Oral daily    MEDICATIONS (PRN):  --------------------------------------------------------------------------------------------    Vitals:   T(C): 36.3 (04-17-24 @ 15:52), Max: 36.3 (04-17-24 @ 08:55)  HR: 97 (04-17-24 @ 15:52) (92 - 110)  BP: 119/75 (04-17-24 @ 15:52) (97/66 - 119/75)  RR: 18 (04-17-24 @ 15:52) (17 - 18)  SpO2: 97% (04-17-24 @ 15:52) (95% - 97%)  CAPILLARY BLOOD GLUCOSE        CAPILLARY BLOOD GLUCOSE              PHYSICAL EXAM:   General: Alert, NAD  Neuro: A+Ox3  HEENT: NC/AT, no asymmetry, no scleral icterus  Neck: Soft, supple  Cardio: RRR  Resp: Airway patent, unlabored breathing  Thorax: No chest wall tenderness  GI/Abd: Soft, ND, nttp, LLQ w sub centimeter incision, packed, surrounding induration w/o fluctuance, no purulent drainage or malodor, no rebound/guarding, no masses palpated  Vascular: All 4 extremities warm  Skin: Intact, no breakdown  Musculoskeletal: All 4 extremities moving spontaneously, no limitations  --------------------------------------------------------------------------------------------    LABS                --------------------------------------------------------------------------------------------    MICROBIOLOGY  Urinalysis (04-16 @ 08:25):     Color:  / Appearance:  / SG:  / pH:  / Gluc: 98 / Ketones:  / Bili:  / Urobili:  / Protein : / Nitrites:  / Leuk.Est:  / RBC:  / WBC:  / Sq Epi:  / Non Sq Epi:  / Bacteria        -> .Blood Blood-Peripheral Culture (04-16 @ 07:55)     NG    NG    No growth at 24 hours    -> .Blood Blood-Peripheral Culture (04-16 @ 07:45)     NG    NG    No growth at 24 hours    -> .Abscess left abdominal wall Culture (04-14 @ 19:52)       Few polymorphonuclear leukocytes seen per low power field  Few Gram positive cocci in pairs seen per oil power field<!>    Methicillin resistant Staphylococcus aureus<!>    Moderate Methicillin Resistant Staphylococcus aureus<!>    -> .Blood Blood-Peripheral Culture (04-14 @ 15:15)     NG    NG    No growth at 72 Hours    -> .Blood Blood-Peripheral Culture (04-14 @ 15:00)       Growth in anaerobic bottle: Gram Positive Cocci in Clusters<!>    Blood Culture PCR<!>    Growth in anaerobic bottle: Staphylococcus epidermidis Isolation of  Coagulase negative Staphylococcus from single blood culture sets may  represent  contamination. Contact the Microbiology Department at [phone] if  susceptibility testing is  clinically indicated.  Direct identification is available within approximately 3-5  hours either by Blood Panel Multiplexed PCR or Direct  MALDI-TOF. Details: [email or web address]<!>    -> Clean Catch Clean Catch (Midstream) Culture (04-14 @ 14:18)     NG    NG    <10,000 CFU/mL Normal Urogenital Laura      --------------------------------------------------------------------------------------------    IMAGING

## 2024-04-17 NOTE — PROGRESS NOTE ADULT - SUBJECTIVE AND OBJECTIVE BOX
NEPHROLOGY-[facility] [phone]        Patient seen and examined in bed.  She was the same         MEDICATIONS  (STANDING):  chlorhexidine 2% Cloths 1 Application(s) Topical daily  doxycycline IVPB 100 milliGRAM(s) IV Intermittent once  doxycycline monohydrate Capsule 100 milliGRAM(s) Oral every 12 hours  furosemide    Tablet 20 milliGRAM(s) Oral <User Schedule>  ipratropium    for Nebulization 500 MICROGram(s) Nebulizer every 6 hours  mupirocin 2% Nasal 1 Application(s) Both Nostrils two times a day  pantoprazole    Tablet 40 milliGRAM(s) Oral before breakfast  predniSONE   Tablet 10 milliGRAM(s) Oral daily  Remodulin (treprostinil) SubQ Infusion 1 Dose(s) 1 Dose(s) SubCutaneous Continuous Pump  rivaroxaban 20 milliGRAM(s) Oral with dinner  sildenafil (REVATIO) 20 milliGRAM(s) Oral every 8 hours  spironolactone 50 milliGRAM(s) Oral daily      VITAL:  T(C): , Max: 36.6 (04-16-24 @ 08:45)  T(F): , Max: 97.8 (04-16-24 @ 08:45)  HR: 98 (04-16-24 @ 16:00)  BP: 101/71 (04-16-24 @ 16:00)  BP(mean): --  RR: 17 (04-16-24 @ 16:00)  SpO2: 98% (04-16-24 @ 16:00)  Wt(kg): --    I and O's:        PHYSICAL EXAM:    Constitutional: NAD  Neck:  No JVD  Respiratory: CTAB/L  Cardiovascular: S1 and S2 tachy   Gastrointestinal: BS+, soft, NT/ND  Extremities: No peripheral edema  Neurological: A/O x 3, no focal deficits  Psychiatric: Normal mood, normal affect  : No Cutler  Skin: No rashes  Access: Not applicable    LABS:                        12.2   10.18 )-----------( 346      ( 16 Apr 2024 08:25 )             40.6     04-16    138  |  106  |  8   ----------------------------<  98  3.6   |  21<L>  |  0.93    Ca    8.6      16 Apr 2024 08:25  Phos  2.9     04-16  Mg     1.8     04-16    TPro  7.1  /  Alb  3.7  /  TBili  0.6  /  DBili  x   /  AST  9<L>  /  ALT  <5<L>  /  AlkPhos  59  04-16          Urine Studies:  Urinalysis Basic - ( 16 Apr 2024 08:25 )    Color: x / Appearance: x / SG: x / pH: x  Gluc: 98 mg/dL / Ketone: x  / Bili: x / Urobili: x   Blood: x / Protein: x / Nitrite: x   Leuk Esterase: x / RBC: x / WBC x   Sq Epi: x / Non Sq Epi: x / Bacteria: x            RADIOLOGY & ADDITIONAL STUDIES:

## 2024-04-17 NOTE — DISCHARGE NOTE PROVIDER - CARE PROVIDERS DIRECT ADDRESSES
[email or web address]

## 2024-04-17 NOTE — DISCHARGE NOTE PROVIDER - NSDCFUSCHEDAPPT_GEN_ALL_CORE_FT
[facility] Physician [facility] [phone]  Scheduled Appointment: 06/18/2024

## 2024-04-17 NOTE — CONSULT NOTE ADULT - ASSESSMENT
A/P:43 year old F with a history of pulmonary HTN (group I and IV on Rimodulin and Xarelto, on home 2L O2), RHF in the setting of PAH, RA thrombus s/p thrombectomy, history of PE (on Xarelto)  multiple abdominal wall abscesses (s/p I&D), asthma, prior AVNRT ablation (5/20), bradycardia s/p dual chamber PPM  presenting for evaluation of abdominal wall abscess. The patient states she usually has her remodulin pump placed on RLQ and last week developed pain and swelling to that area, and changed her pump site to LLQ. Since then, she started to develop worsening abdominal pian and swelling to the RLQ and went to see her pulmonologist and PCP Dr. Yoon who recommended she present to the ED for evaluation of the abscess.    Wound Consult requested to assist w/ management of  RLQ wound    Recommendation:   Irrigate with NS, pack with 1/4" plain packing strip    Abx per Medicine/ ID  Moisturize intact skin w/ SWEEN cream BID  Nutrition Consult for optimization in pt w/ Increased nutritional needs            encourage high quality protein, margoth/ prosource, MVI & Vit C to promote wound healing  Continue turning and positioning w/ offloading assistive devices as per protocol       Continue w/ attends under pads and Pericare as per protocol  Waffle Cushion to chair when oob to chair  Continue w/ low air loss pressure redistribution bed surface  Care as per medicine, will follow w/ you  Upon discharge f/u as outpatient at Wound Center [address] [place] [address] [phone]  Seen w/ attng & and D/w RN  Thank you for this consult,  RAGHU Hercules-BC, [facility]  [phone]  Nights/ Weekends/ Holidays please call:  General Surgery Consult pager [phone]) for emergencies  Wound PT for multilayer leg wrapping or VAC issues (x [phone])  A/P:43 year old F with a history of pulmonary HTN (group I and IV on Rimodulin and Xarelto, on home 2L O2), RHF in the setting of PAH, RA thrombus s/p thrombectomy, history of PE (on Xarelto)  multiple abdominal wall abscesses (s/p I&D), asthma, prior AVNRT ablation (5/20), bradycardia s/p dual chamber PPM  presenting for evaluation of abdominal wall abscess. The patient states she usually has her remodulin pump placed on RLQ and last week developed pain and swelling to that area, and changed her pump site to LLQ. Since then, she started to develop worsening abdominal pian and swelling to the RLQ and went to see her pulmonologist and PCP Dr. Yoon who recommended she present to the ED for evaluation of the abscess.    Wound Consult requested to assist w/ management of  abdominal wound (abscess s/p I&D by ER)    Recommendation:   Irrigate with NS, pack with 1/4" plain packing strip    Abx per Medicine/ ID  Moisturize intact skin w/ SWEEN cream BID  Nutrition Consult for optimization in pt w/ Increased nutritional needs            encourage high quality protein, margoth/ prosource, MVI & Vit C to promote wound healing  Continue turning and positioning w/ offloading assistive devices as per protocol       Continue w/ attends under pads and Pericare as per protocol  Waffle Cushion to chair when oob to chair  Continue w/ low air loss pressure redistribution bed surface  Care as per medicine, will follow w/ you  Upon discharge f/u as outpatient at Wound Center [address] [facility] [phone]  Seen w/ attng & and D/w RN  Thank you for this consult,  RAGHU Hercules-BC, [facility]  [phone]  Nights/ Weekends/ Holidays please call:  General Surgery Consult pager [phone]) for emergencies  Wound PT for multilayer leg wrapping or VAC issues (x [phone])

## 2024-04-17 NOTE — DISCHARGE NOTE PROVIDER - NSDCCPTREATMENT_GEN_ALL_CORE_FT
PRINCIPAL PROCEDURE  Procedure: CT abdomen pelvis  Findings and Treatment:   CONTRAST/COMPLICATIONS:  IV Contrast: Omnipaque 350  90 cc administered   10 cc discarded  Oral Contrast: NONE  Complications: None reported at time of study completion  PROCEDURE:  CT of the Abdomen and Pelvis was performed.  Sagittal and coronal reformats were performed.  FINDINGS:  LOWER CHEST: Within normal limits.  LIVER: Within normal limits.  BILE DUCTS: Normal caliber.  GALLBLADDER: Within normal limits.  SPLEEN: Stable 1.2 cm hypodensity.  PANCREAS: Within normal limits.  ADRENALS: Within normal limits.  KIDNEYS/URETERS: Symmetric enhancement. No collecting system obstruction   bilaterally.  BLADDER: Within normal limits.  REPRODUCTIVE ORGANS: An IUD is noted.  BOWEL: No bowel obstruction. Appendix is normal.  PERITONEUM: No ascites.  VESSELS: Within normallimits.  RETROPERITONEUM/LYMPH NODES: No lymphadenopathy.  ABDOMINAL WALL: A new, 2.5 x 3.0 cm left anterior abdominal wall   subcutaneous walled off collection is noted with adjacent skin   thickening. A smaller, approximate 1.2 cm right anterior abdominal wall   subcutaneous collection is noted with associated skin thickening. This is   in the approximate location of a previously noted right anterior   abdominal wall subcutaneous abscess.  BONES: Degenerative changes.  IMPRESSION:  1.  A new,3.0 cm left anterior abdominal wall subcutaneous collection   noted, compatible with an abscess.  2.  Near complete resolution not complete resolution of a previously   noted right anterior abdominal wall collection/abscess.

## 2024-04-17 NOTE — PROGRESS NOTE ADULT - PROBLEM SELECTOR PLAN 2
Hx of  Pulm HTN 2/2 PAH (Group I and IV) on treopostinil (remodulin SQ with prior SQ site infection/abscesses) , complicated by chronic hypoxemic respiratory failure (2L NC) also on Xarelto for hx of RA thrombus  Follows with Dr. Yoon, on treprostinil SQ, sildenafil at home  Appears stable from pulmonary perspective    Plan:  -c/w Remodulin (treprostinil) 48 ng/kg/mg and home sildanefil 20 mg TID   - c/w home spironolactone   - Continue xarelto for hx of RA thrombus/PE  -continue prednisone 10 mg daily  -continue home 2L NC  -avoid CCB or BB due to severe pulmonary HTN

## 2024-04-17 NOTE — DISCHARGE NOTE PROVIDER - HOSPITAL COURSE
43 year old F with a history of pulmonary HTN (group I and IV on Rimodulin and Xarelto, on home 2L O2), RHF in the setting of PAH, RA thrombus s/p thrombectomy, history of PE (on Xarelto)  multiple abdominal wall abscesses (s/p I&D), asthma, prior AVNRT ablation (5/20), bradycardia s/p dual chamber PPM  presented for evaluation of abdominal wall abscess. Pt was found to be afebrile with stable vital signs. She had a  CT A/P showing a new 2.5x 3cm collection in L anterior abdominal wall. She underwent I&D in the ED. Her prior wound cultures from January 2023 had grown MRSA staph lugdunesis, which was sensitive to doxy in the past. She was given cefazolin and doxycycline in the ED and continued on doxycyline while in-patient. Her abdominal abscess grew MRSA again and ID was consulted, recommending doxycycline__________. Her blood cultures grew gram positive cocci in 1 anaerobic bottle which was staph epidermis likely contaminant. Repeat blood cultures with no growth to date. Surgery evaluated the patient on 4/17 for further packing management/ evaluation of the abscess and found_________.  Pt was continued on her home remodulin pump as well as her sildenafil for her hx of pulm HN. She also continued her home prednisone 10mg daily as well as her home 2L NC. She remained asymptomatic and without events on telemery. She was continued with her home lasix 20mg M/W/F as well as her home spironolactone for hx of HF which was increased from 25 to 50mg per nephrology for enhanced diuresis. She was also on her home xarelto for her hx of RA thrombus.   Pt also continued on her home atrovent for hx of asthma as well as albuterol.              43 year old F with a history of pulmonary HTN (group I and IV on Rimodulin and Xarelto, on home 2L O2), RHF in the setting of PAH, RA thrombus s/p thrombectomy, history of PE (on Xarelto)  multiple abdominal wall abscesses (s/p I&D), asthma, prior AVNRT ablation (5/20), bradycardia s/p dual chamber PPM  presented for evaluation of abdominal wall abscess. Pt was found to be afebrile with stable vital signs. She had a  CT A/P showing a new 2.5x 3cm collection in L anterior abdominal wall. She underwent I&D in the ED. Her prior wound cultures from January 2023 had grown MRSA staph lugdunesis, which was sensitive to doxy in the past. She was given cefazolin and doxycycline in the ED and continued on doxycyline while in-patient. Her abdominal abscess grew MRSA again and ID was consulted, recommending doxycycline 100mg BID for 10 days total as an outpatient. Her blood cultures grew gram positive cocci in 1 anaerobic bottle which was staph epidermis likely contaminant. Repeat blood cultures with no growth to date. Surgery evaluated the patient on 4/17 for further packing management/ evaluation of the abscess and found abscess adequately drained. Wound care evaluated the patient and applied dressing/ packing.   Pt was continued on her home remodulin pump as well as her sildenafil for her hx of pulm HN. She also continued her home prednisone 10mg daily as well as her home 2L NC. She remained asymptomatic and without events on telemery. She was continued with her home lasix 20mg M/W/F as well as her home spironolactone for hx of HF which was increased from 25 to 50mg per nephrology for enhanced diuresis. She was also on her home xarelto for her hx of RA thrombus.   Pt also continued on her home atrovent for hx of asthma as well as albuterol.     Follow Up:  Infectious Disease  Pulmunology  Wound Care     Medications to start:  Doxycycline 100mg BID for 10 days total                43 year old F with a history of pulmonary HTN (group I and IV on Rimodulin and Xarelto, on home 2L O2), RHF in the setting of PAH, RA thrombus s/p thrombectomy, history of PE (on Xarelto)  multiple abdominal wall abscesses (s/p I&D), asthma, prior AVNRT ablation (5/20), bradycardia s/p dual chamber PPM  presented for evaluation of abdominal wall abscess. Pt was found to be afebrile with stable vital signs. She had a  CT A/P showing a new 2.5x 3cm collection in L anterior abdominal wall. She underwent I&D in the ED. Her prior wound cultures from January 2023 had grown MRSA staph lugdunesis, which was sensitive to doxy in the past. She was given cefazolin and doxycycline in the ED and continued on doxycyline while in-patient. Her abdominal abscess grew MRSA again and ID was consulted, recommending doxycycline 100mg BID for 10 days total as an outpatient. Her blood cultures grew gram positive cocci in 1 anaerobic bottle which was staph epidermis likely contaminant. Repeat blood cultures with no growth to date. Surgery evaluated the patient on 4/17 for further packing management/ evaluation of the abscess and found abscess adequately drained. Wound care evaluated the patient and applied dressing/ packing.   Pt was continued on her home remodulin pump as well as her sildenafil for her hx of pulm HN. She also continued her home prednisone 10mg daily as well as her home 2L NC. She remained asymptomatic and without events on telemery. She was continued with her home lasix 20mg M/W/F as well as her home spironolactone for hx of HF which was increased from 25 to 50mg per nephrology for enhanced diuresis. She was also on her home xarelto for her hx of RA thrombus.   Pt also continued on her home atrovent for hx of asthma as well as albuterol.     Follow Up:  Infectious Disease  Pulmonology  Wound Care     Medications to start:  Doxycycline 100mg BID for 10 days total

## 2024-04-17 NOTE — PROGRESS NOTE ADULT - SUBJECTIVE AND OBJECTIVE BOX
PROGRESS NOTE:   Authored by Dr. Fani Lopez MD (PGY-1). Pager [facility] [phone] / [place] ( [id number]) or via TEAMS    Patient is a 43y old  Female who presents with a chief complaint of Abdominal wall abscess (16 Apr 2024 16:23)      SUBJECTIVE / OVERNIGHT EVENTS:  No acute events overnight.     ADDITIONAL REVIEW OF SYSTEMS:  Patient denies fevers, chills, chest pain, shortness of breath, nausea, abdominal pain, diarrhea, constipation, dysuria, leg swelling, headache, light headedness.    MEDICATIONS  (STANDING):  chlorhexidine 2% Cloths 1 Application(s) Topical daily  doxycycline IVPB 100 milliGRAM(s) IV Intermittent every 12 hours  furosemide    Tablet 20 milliGRAM(s) Oral <User Schedule>  ipratropium    for Nebulization 500 MICROGram(s) Nebulizer every 6 hours  mupirocin 2% Nasal 1 Application(s) Both Nostrils two times a day  pantoprazole    Tablet 40 milliGRAM(s) Oral before breakfast  predniSONE   Tablet 10 milliGRAM(s) Oral daily  Remodulin (treprostinil) SubQ Infusion 1 Dose(s) 1 Dose(s) SubCutaneous Continuous Pump  rivaroxaban 20 milliGRAM(s) Oral with dinner  sildenafil (REVATIO) 20 milliGRAM(s) Oral every 8 hours  spironolactone 50 milliGRAM(s) Oral daily    MEDICATIONS  (PRN):      CAPILLARY BLOOD GLUCOSE        I&O's Summary      PHYSICAL EXAM:  Vital Signs Last 24 Hrs  T(C): 36.5 (16 Apr 2024 16:00), Max: 36.6 (16 Apr 2024 08:45)  T(F): 97.7 (16 Apr 2024 16:00), Max: 97.8 (16 Apr 2024 08:45)  HR: 98 (16 Apr 2024 16:00) (98 - 111)  BP: 101/71 (16 Apr 2024 16:00) (85/64 - 101/71)  BP(mean): --  RR: 17 (16 Apr 2024 16:00) (17 - 18)  SpO2: 98% (16 Apr 2024 16:00) (95% - 98%)    Parameters below as of 16 Apr 2024 16:00  Patient On (Oxygen Delivery Method): nasal cannula  O2 Flow (L/min): 2      CONSTITUTIONAL: NAD, well-developed  RESPIRATORY: Normal respiratory effort; lungs are clear to auscultation bilaterally  CARDIOVASCULAR: Regular rate and rhythm, normal S1 and S2, no murmur/rub/gallop; No lower extremity edema; Peripheral pulses are 2+ bilaterally  ABDOMEN: Nontender to palpation, normoactive bowel sounds, no rebound/guarding; No hepatosplenomegaly  MSK: no clubbing or cyanosis of digits; no joint swelling or tenderness to palpation  PSYCH: A+O to person, place, and time; affect appropriate    LABS:                        12.2   10.18 )-----------( 346      ( 16 Apr 2024 08:25 )             40.6     04-16    138  |  106  |  8   ----------------------------<  98  3.6   |  21<L>  |  0.93    Ca    8.6      16 Apr 2024 08:25  Phos  2.9     04-16  Mg     1.8     04-16    TPro  7.1  /  Alb  3.7  /  TBili  0.6  /  DBili  x   /  AST  9<L>  /  ALT  <5<L>  /  AlkPhos  59  04-16          Urinalysis Basic - ( 16 Apr 2024 08:25 )    Color: x / Appearance: x / SG: x / pH: x  Gluc: 98 mg/dL / Ketone: x  / Bili: x / Urobili: x   Blood: x / Protein: x / Nitrite: x   Leuk Esterase: x / RBC: x / WBC x   Sq Epi: x / Non Sq Epi: x / Bacteria: x        Culture - Abscess with Gram Stain (collected 14 Apr 2024 19:52)  Source: .Abscess left abdominal wall  Gram Stain (15 Apr 2024 02:28):    Few polymorphonuclear leukocytes seen per low power field    Few Gram positive cocci in pairs seen per oil power field  Preliminary Report (16 Apr 2024 17:25):    Moderate Methicillin Resistant Staphylococcus aureus  Organism: Methicillin resistant Staphylococcus aureus (16 Apr 2024 17:24)  Organism: Methicillin resistant Staphylococcus aureus (16 Apr 2024 17:24)    Culture - Blood (collected 14 Apr 2024 15:15)  Source: .Blood Blood-Peripheral  Preliminary Report (16 Apr 2024 19:02):    No growth at 48 Hours    Culture - Blood (collected 14 Apr 2024 15:00)  Source: .Blood Blood-Peripheral  Gram Stain (16 Apr 2024 03:19):    Growth in anaerobic bottle: Gram Positive Cocci in Clusters  Preliminary Report (16 Apr 2024 22:06):    Growth in anaerobic bottle: Staphylococcus epidermidis    Direct identification is available within approximately 3-5    hours either by Blood Panel Multiplexed PCR or Direct    MALDI-TOF. Details: [email or web address]  Organism: Blood Culture PCR (16 Apr 2024 05:20)  Organism: Blood Culture PCR (16 Apr 2024 05:20)    Culture - Urine (collected 14 Apr 2024 14:18)  Source: Clean Catch Clean Catch (Midstream)  Final Report (15 Apr 2024 21:52):    <10,000 CFU/mL Normal Urogenital Laura        Tele Reviewed:    RADIOLOGY & ADDITIONAL TESTS:  Results Reviewed:   Imaging Personally Reviewed:  Electrocardiogram Personally Reviewed:     PROGRESS NOTE:   Authored by Dr. Fani Lopez MD (PGY-1). Pager [facility] [phone] / [place] ( [id number]) or via TEAMS    Patient is a 43y old  Female who presents with a chief complaint of Abdominal wall abscess (16 Apr 2024 16:23)      SUBJECTIVE / OVERNIGHT EVENTS:  No acute events overnight. Packing changed yesterday, pain improved per patient. Denies CP, SOB.       MEDICATIONS  (STANDING):  chlorhexidine 2% Cloths 1 Application(s) Topical daily  doxycycline IVPB 100 milliGRAM(s) IV Intermittent every 12 hours  furosemide    Tablet 20 milliGRAM(s) Oral <User Schedule>  ipratropium    for Nebulization 500 MICROGram(s) Nebulizer every 6 hours  mupirocin 2% Nasal 1 Application(s) Both Nostrils two times a day  pantoprazole    Tablet 40 milliGRAM(s) Oral before breakfast  predniSONE   Tablet 10 milliGRAM(s) Oral daily  Remodulin (treprostinil) SubQ Infusion 1 Dose(s) 1 Dose(s) SubCutaneous Continuous Pump  rivaroxaban 20 milliGRAM(s) Oral with dinner  sildenafil (REVATIO) 20 milliGRAM(s) Oral every 8 hours  spironolactone 50 milliGRAM(s) Oral daily    MEDICATIONS  (PRN):      CAPILLARY BLOOD GLUCOSE        I&O's Summary      PHYSICAL EXAM:  Vital Signs Last 24 Hrs  T(C): 36.5 (16 Apr 2024 16:00), Max: 36.6 (16 Apr 2024 08:45)  T(F): 97.7 (16 Apr 2024 16:00), Max: 97.8 (16 Apr 2024 08:45)  HR: 98 (16 Apr 2024 16:00) (98 - 111)  BP: 101/71 (16 Apr 2024 16:00) (85/64 - 101/71)  BP(mean): --  RR: 17 (16 Apr 2024 16:00) (17 - 18)  SpO2: 98% (16 Apr 2024 16:00) (95% - 98%)    Parameters below as of 16 Apr 2024 16:00  Patient On (Oxygen Delivery Method): nasal cannula  O2 Flow (L/min): 2      CONSTITUTIONAL: NAD, well-developed  RESPIRATORY: Normal respiratory effort; lungs are clear to auscultation bilaterally  CARDIOVASCULAR: Regular rate and rhythm, normal S1 and S2, no murmur/rub/gallop; No lower extremity edema; Peripheral pulses are 2+ bilaterally  ABDOMEN: LLQ packing in place, indurated no obvious fluctuance, mild purulence expressed on palpation,  Nontender to palpation, normoactive bowel sounds, no rebound/guarding; No hepatosplenomegaly  MSK: no clubbing or cyanosis of digits; no joint swelling or tenderness to palpation  PSYCH: A+O to person, place, and time; affect appropriate    LABS:                        12.2   10.18 )-----------( 346      ( 16 Apr 2024 08:25 )             40.6     04-16    138  |  106  |  8   ----------------------------<  98  3.6   |  21<L>  |  0.93    Ca    8.6      16 Apr 2024 08:25  Phos  2.9     04-16  Mg     1.8     04-16    TPro  7.1  /  Alb  3.7  /  TBili  0.6  /  DBili  x   /  AST  9<L>  /  ALT  <5<L>  /  AlkPhos  59  04-16          Urinalysis Basic - ( 16 Apr 2024 08:25 )    Color: x / Appearance: x / SG: x / pH: x  Gluc: 98 mg/dL / Ketone: x  / Bili: x / Urobili: x   Blood: x / Protein: x / Nitrite: x   Leuk Esterase: x / RBC: x / WBC x   Sq Epi: x / Non Sq Epi: x / Bacteria: x        Culture - Abscess with Gram Stain (collected 14 Apr 2024 19:52)  Source: .Abscess left abdominal wall  Gram Stain (15 Apr 2024 02:28):    Few polymorphonuclear leukocytes seen per low power field    Few Gram positive cocci in pairs seen per oil power field  Preliminary Report (16 Apr 2024 17:25):    Moderate Methicillin Resistant Staphylococcus aureus  Organism: Methicillin resistant Staphylococcus aureus (16 Apr 2024 17:24)  Organism: Methicillin resistant Staphylococcus aureus (16 Apr 2024 17:24)    Culture - Blood (collected 14 Apr 2024 15:15)  Source: .Blood Blood-Peripheral  Preliminary Report (16 Apr 2024 19:02):    No growth at 48 Hours    Culture - Blood (collected 14 Apr 2024 15:00)  Source: .Blood Blood-Peripheral  Gram Stain (16 Apr 2024 03:19):    Growth in anaerobic bottle: Gram Positive Cocci in Clusters  Preliminary Report (16 Apr 2024 22:06):    Growth in anaerobic bottle: Staphylococcus epidermidis    Direct identification is available within approximately 3-5    hours either by Blood Panel Multiplexed PCR or Direct    MALDI-TOF. Details: [email or web address]  Organism: Blood Culture PCR (16 Apr 2024 05:20)  Organism: Blood Culture PCR (16 Apr 2024 05:20)    Culture - Urine (collected 14 Apr 2024 14:18)  Source: Clean Catch Clean Catch (Midstream)  Final Report (15 Apr 2024 21:52):    <10,000 CFU/mL Normal Urogenital Laura        Tele Reviewed:    RADIOLOGY & ADDITIONAL TESTS:  Results Reviewed:   Imaging Personally Reviewed:  Electrocardiogram Personally Reviewed:

## 2024-04-17 NOTE — DISCHARGE NOTE PROVIDER - CARE PROVIDER_API CALL
Jon Martinez  Infectious Disease  [address] [place] [id number]  Phone: [phone]  Fax: [phone]  Follow Up Time: 2 weeks    Vivian Yoon  Pulmonary Disease  [address] [place] [address] [address]  [place] [address]  Phone: [phone]  Fax: [phone]  Follow Up Time: 2 weeks   Jon Martinez  Infectious Disease  [address] [place] [id number]  Phone: [phone]  Fax: [phone]  Follow Up Time: 2 weeks    Vivian Yoon  Pulmonary Disease  [address] [facility], [address]  [place] [address]  Phone: [phone]  Fax: [phone]  Follow Up Time: 2 weeks    WoundCare Team,   Wound Center [address] [place] [address] [phone]  Phone: (   )    -  Fax: (   )    -  Follow Up Time: 1 week

## 2024-04-17 NOTE — DISCHARGE NOTE PROVIDER - NSDCCPCAREPLAN_GEN_ALL_CORE_FT
PRINCIPAL DISCHARGE DIAGNOSIS  Diagnosis: Cutaneous abscess of abdominal wall  Assessment and Plan of Treatment: You were found to have an abdominal wall abscess which is recurrent in nature secondary to your remodulin pump placement. This abscess grew MRSA like it did in the past, ID saw you and recommended continuing doxycyline ________. Surgery also evaluated you for the packing of the abscess and recommended_____.  Please follow up with ID for outpatient management of your recurrent abdominal abscesses.      SECONDARY DISCHARGE DIAGNOSES  Diagnosis: Pulmonary hypertension  Assessment and Plan of Treatment: Your home remodulin pump and home pulm htn medications were continued inpatient. You required your home 2L of NC and remained asymptomatic/ withou events on telemetry. Please continue to see with your outpatient pulmonologist Dr. Yoon.     PRINCIPAL DISCHARGE DIAGNOSIS  Diagnosis: Cutaneous abscess of abdominal wall  Assessment and Plan of Treatment: You were found to have an abdominal wall abscess which is recurrent in nature secondary to your remodulin pump placement. This abscess grew MRSA like it did in the past, ID saw you and recommended continuing doxycyline 100mg BID for 10 days total as an outpatient.  Surgery also evaluated you for the packing of the abscess and recommended no surgical intervention, instead wound care evaluated you and applied packing/ dressing.   Please follow up with ID for outpatient management of your recurrent abdominal abscesses.  Please  follow with wound care at [facility] [address] [place] [address] [phone]        SECONDARY DISCHARGE DIAGNOSES  Diagnosis: Pulmonary hypertension  Assessment and Plan of Treatment: Your home remodulin pump and home pulm htn medications were continued inpatient. You required your home 2L of NC and remained asymptomatic/ withou events on telemetry. Please continue to see with your outpatient pulmonologist Dr. Yoon.

## 2024-04-17 NOTE — CONSULT NOTE ADULT - NS ATTEND AMEND GEN_ALL_CORE FT
Pt seen and examined with ACP.  Assessment and plan reviewed and discussed.  Agree with above.    Status of wounds and treatment recommendations d/w  pt.  All questions answered.   Pt expressed understanding.    I spent 55  minutes face to face w/ this pt of which more than 50% of the time was spent counseling & coordinating care of this pt.
43-yo F w/ PMH pHTN on Remodulin and Xarelto, RA thrombus s/p thrombectomy, PE, and recurrent abdominal abscesses s/p multiple I&D, AVNRT s/p ablation, bradycardia s/p PPM (2016), and asthma, presenting for abdominal wall abscess. Abscess to LLQ i/s/o Remodulin pump insertion to LLQ. Previously had RLQ abscess from the pump insertion there. S/p I&D in ED (4/14), abscess culture groews MRSA. Of note, patient had allergic reaction to vancomycin - thought to be Red man syndrome; upon repeat challenge w/ slow infusion resulted in pruritus w/o rash or erythema.    #Abdominal wall abscess  #MRSA infection  #Leukocytosis  #Blood culture positivity  - BCx positive with Staph epi (1 out of 4 bottles). Likely contaminant. Repeat BCx x2 sets AM.  - MRSA grew from LLQ abscess. On doxycycline. Switch from IV to  mg PO Q12H  - Dressing changed during ID initial eval. Packing and remaining abscess needs to be addressed. Still with pustular drainage noted on dressing. Concern for incomplete drainage. Agree with patient's request re: formal surgical eval and dressing/packing management.  - F/u fever curve and WBC    Plan discussed with primary team house staff.  Thank you for this consult. Inpatient ID team will follow.    Jon Martinez MD, PhD  Attending Physician  Division of Infectious Diseases  Department of Medicine    Please contact through MS Teams message.  Office: [phone] (after 5 PM or weekend)

## 2024-04-17 NOTE — PROGRESS NOTE ADULT - ATTENDING COMMENTS
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   Endorsing intermittent SOB, sometimes with wheezing, stable abd pain, no LE edema.     - c/w doxycycline for now based on prior culture data, ID following   - 1/4 Bcx + strep epi; abscess cx + staph --> suspect Bcx is contaminant. Repeat Bcx NGTD   - surgery/wound care consulted to eval I+D site   - c/w Rimodulin for now, pump site RLQ   - on home O2 2LNC, c/w nebs as needed.  - Continue xarelto for hx of RA thrombus/PE  - c/w home lasix, increase aldactone per renal for RHF    possible d/c home tomorrow 43 year old F with a history of pulmonary HTN (group I and IV on Rimodulin and Xarelto, on home 2L O2), RHF in the setting of PAH, RA thrombus s/p thrombectomy, history of PE (on Xarelto)  multiple abdominal wall abscesses (s/p I&D), asthma, prior AVNRT ablation (5/20), bradycardia s/p dual chamber PPM p/w LLQ abdominal wall abscess, s/p I+D in ED, admitted for further management.   Endorsing intermittent SOB, sometimes with wheezing, stable abd pain, no LE edema.     - c/w doxycycline for now based on prior culture data, ID following   - 1/4 Bcx + strep epi; abscess cx + staph --> suspect Bcx is contaminant. Repeat Bcx NGTD   - surgery/wound care consulted to eval I+D site   - c/w Rimodulin for now, pump site RLQ   - on home O2 2LNC, c/w nebs as needed.  - Continue xarelto for hx of RA thrombus/PE  - c/w home lasix, increase aldactone per renal for RHF    possible d/c home tomorrow    pt admitted with sepsis (tachycardia, leukocytosis) 2/2 abdominal wall abscess growing MRSA

## 2024-04-17 NOTE — DISCHARGE NOTE PROVIDER - NSDCMRMEDTOKEN_GEN_ALL_CORE_FT
Atrovent 18 mcg/inh inhalation aerosol: 1 puff(s) inhaled every 6 hours as needed for  shortness of breath and/or wheezing  Lasix 20 mg oral tablet: 1 tab(s) orally Monday, Wednesday, and Friday  omeprazole 40 mg oral delayed release capsule: 1 cap(s) orally once a day  predniSONE 10 mg oral tablet: 1 tab(s) orally once a day  Remodulin 5 mg/mL injectable solution: 1 application injectable once a day patient is taking 48ng/kg/min via her own SQ PUMP  rivaroxaban 20 mg oral tablet: 1 tab(s) orally once a day (before a meal)  sildenafil 20 mg oral tablet: 1 tab(s) orally every 8 hours   spironolactone 25 mg oral tablet: 1 tab(s) orally once a day NOTE: As per Pharmacy, last filled was May 2023 for 30 days supply   Atrovent 18 mcg/inh inhalation aerosol: 1 puff(s) inhaled every 6 hours as needed for  shortness of breath and/or wheezing  doxycycline monohydrate 50 mg oral capsule: 2 cap(s) orally every 12 hours  Lasix 20 mg oral tablet: 1 tab(s) orally Monday, Wednesday, and Friday  omeprazole 40 mg oral delayed release capsule: 1 cap(s) orally once a day  predniSONE 10 mg oral tablet: 1 tab(s) orally once a day  Remodulin 5 mg/mL injectable solution: 1 application injectable once a day patient is taking 48ng/kg/min via her own SQ PUMP  rivaroxaban 20 mg oral tablet: 1 tab(s) orally once a day (before a meal)  sildenafil 20 mg oral tablet: 1 tab(s) orally every 8 hours   spironolactone 25 mg oral tablet: 2 tab(s) orally once a day

## 2024-04-17 NOTE — PROGRESS NOTE ADULT - PROBLEM SELECTOR PLAN 4
Right heart failure with normal LVEF in setting of PAH, appears euvolemic this admission, on home O2 2L requirement  TTE 8/23:  1. RV volume and pressure overload. Normal LV systolic fx w EF 55 to 60 %.   2. Mildly enlarged right ventricular cavity size and reduced systolic right ventricular function. 3. Device lead is visualized in the right heart.  RHC 7/23/20 RA 4, RV 80/4, PA 80/40/57, PCWP 22, LVEDP 4; CO/CI 4/2.0; PVR 11 (using EDP 13).   EKG this admission: Sinus tachycardia, R axis deviation, RVH,     Plan:  - on home spironolactone 25 mg daily, increase to 50mg 4/16   -c/w home Lasix 20 mg MWF  - Replete electrolytes to K>4.0 and Mg >2.5 Right heart failure with normal LVEF in setting of PAH, appears euvolemic this admission, on home O2 2L requirement  TTE 8/23:  1. RV volume and pressure overload. Normal LV systolic fx w EF 55 to 60 %.   2. Mildly enlarged right ventricular cavity size and reduced systolic right ventricular function. 3. Device lead is visualized in the right heart.  RHC 7/23/20 RA 4, RV 80/4, PA 80/40/57, PCWP 22, LVEDP 4; CO/CI 4/2.0; PVR 11 (using EDP 13).   EKG this admission: Sinus tachycardia, R axis deviation, RVH,     Plan:  - on home spironolactone 25 mg daily, increase to 50mg 4/16 for optimization of volume status   -c/w home Lasix 20 mg MWF  - Replete electrolytes to K>4.0 and Mg >2.5

## 2024-04-18 ENCOUNTER — TRANSCRIPTION ENCOUNTER (OUTPATIENT)
Age: 44
End: 2024-04-18

## 2024-04-18 VITALS
OXYGEN SATURATION: 93 % | RESPIRATION RATE: 18 BRPM | SYSTOLIC BLOOD PRESSURE: 107 MMHG | DIASTOLIC BLOOD PRESSURE: 73 MMHG | HEART RATE: 113 BPM | TEMPERATURE: 98 F

## 2024-04-18 PROCEDURE — 99233 SBSQ HOSP IP/OBS HIGH 50: CPT | Mod: GC

## 2024-04-18 PROCEDURE — 85610 PROTHROMBIN TIME: CPT

## 2024-04-18 PROCEDURE — 93005 ELECTROCARDIOGRAM TRACING: CPT

## 2024-04-18 PROCEDURE — 83735 ASSAY OF MAGNESIUM: CPT

## 2024-04-18 PROCEDURE — 99232 SBSQ HOSP IP/OBS MODERATE 35: CPT

## 2024-04-18 PROCEDURE — 80053 COMPREHEN METABOLIC PANEL: CPT

## 2024-04-18 PROCEDURE — 82435 ASSAY OF BLOOD CHLORIDE: CPT

## 2024-04-18 PROCEDURE — 84702 CHORIONIC GONADOTROPIN TEST: CPT

## 2024-04-18 PROCEDURE — 82947 ASSAY GLUCOSE BLOOD QUANT: CPT

## 2024-04-18 PROCEDURE — 76536 US EXAM OF HEAD AND NECK: CPT

## 2024-04-18 PROCEDURE — 87186 SC STD MICRODIL/AGAR DIL: CPT

## 2024-04-18 PROCEDURE — 87205 SMEAR GRAM STAIN: CPT

## 2024-04-18 PROCEDURE — 99222 1ST HOSP IP/OBS MODERATE 55: CPT

## 2024-04-18 PROCEDURE — 36415 COLL VENOUS BLD VENIPUNCTURE: CPT

## 2024-04-18 PROCEDURE — 87641 MR-STAPH DNA AMP PROBE: CPT

## 2024-04-18 PROCEDURE — 87150 DNA/RNA AMPLIFIED PROBE: CPT

## 2024-04-18 PROCEDURE — 83605 ASSAY OF LACTIC ACID: CPT

## 2024-04-18 PROCEDURE — 96375 TX/PRO/DX INJ NEW DRUG ADDON: CPT

## 2024-04-18 PROCEDURE — 87040 BLOOD CULTURE FOR BACTERIA: CPT

## 2024-04-18 PROCEDURE — 85018 HEMOGLOBIN: CPT

## 2024-04-18 PROCEDURE — 85730 THROMBOPLASTIN TIME PARTIAL: CPT

## 2024-04-18 PROCEDURE — 84132 ASSAY OF SERUM POTASSIUM: CPT

## 2024-04-18 PROCEDURE — 81001 URINALYSIS AUTO W/SCOPE: CPT

## 2024-04-18 PROCEDURE — 96374 THER/PROPH/DIAG INJ IV PUSH: CPT

## 2024-04-18 PROCEDURE — 94640 AIRWAY INHALATION TREATMENT: CPT

## 2024-04-18 PROCEDURE — 85025 COMPLETE CBC W/AUTO DIFF WBC: CPT

## 2024-04-18 PROCEDURE — 87077 CULTURE AEROBIC IDENTIFY: CPT

## 2024-04-18 PROCEDURE — 82330 ASSAY OF CALCIUM: CPT

## 2024-04-18 PROCEDURE — 85014 HEMATOCRIT: CPT

## 2024-04-18 PROCEDURE — 85027 COMPLETE CBC AUTOMATED: CPT

## 2024-04-18 PROCEDURE — 84100 ASSAY OF PHOSPHORUS: CPT

## 2024-04-18 PROCEDURE — 71045 X-RAY EXAM CHEST 1 VIEW: CPT

## 2024-04-18 PROCEDURE — 99285 EMERGENCY DEPT VISIT HI MDM: CPT

## 2024-04-18 PROCEDURE — 82803 BLOOD GASES ANY COMBINATION: CPT

## 2024-04-18 PROCEDURE — 84295 ASSAY OF SERUM SODIUM: CPT

## 2024-04-18 PROCEDURE — 87070 CULTURE OTHR SPECIMN AEROBIC: CPT

## 2024-04-18 PROCEDURE — 87640 STAPH A DNA AMP PROBE: CPT

## 2024-04-18 PROCEDURE — 74177 CT ABD & PELVIS W/CONTRAST: CPT | Mod: MC

## 2024-04-18 PROCEDURE — 87086 URINE CULTURE/COLONY COUNT: CPT

## 2024-04-18 RX ADMIN — Medication 20 MILLIGRAM(S): at 13:01

## 2024-04-18 RX ADMIN — CHLORHEXIDINE GLUCONATE 1 APPLICATION(S): 213 SOLUTION TOPICAL at 11:42

## 2024-04-18 RX ADMIN — Medication 500 MICROGRAM(S): at 11:42

## 2024-04-18 RX ADMIN — Medication 20 MILLIGRAM(S): at 05:23

## 2024-04-18 RX ADMIN — Medication 100 MILLIGRAM(S): at 05:23

## 2024-04-18 RX ADMIN — PANTOPRAZOLE SODIUM 40 MILLIGRAM(S): 20 TABLET, DELAYED RELEASE ORAL at 05:23

## 2024-04-18 RX ADMIN — SPIRONOLACTONE 50 MILLIGRAM(S): 25 TABLET, FILM COATED ORAL at 08:46

## 2024-04-18 RX ADMIN — Medication 10 MILLIGRAM(S): at 05:23

## 2024-04-18 RX ADMIN — Medication 500 MICROGRAM(S): at 05:23

## 2024-04-18 NOTE — DISCHARGE NOTE NURSING/CASE MANAGEMENT/SOCIAL WORK - PATIENT PORTAL LINK FT
You can access the FollowMyHealth Patient Portal offered by [facility] by registering at the following website: [email or web address]. By joining [employer]’s FollowMyHealth portal, you will also be able to view your health information using other applications (apps) compatible with our system.

## 2024-04-18 NOTE — PROGRESS NOTE ADULT - ATTENDING COMMENTS
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   Abd pain better, intermittent SOB.     - c/w doxycycline for now based on prior culture data, ID following   - 1/4 Bcx + strep epi; abscess cx + staph --> suspect Bcx is contaminant. Repeat Bcx NGTD   - surgery/wound care eval appreciated   - c/w Rimodulin for now, pump site RLQ   - on home O2 2LNC, c/w nebs as needed.  - Continue xarelto for hx of RA thrombus/PE  - c/w home lasix, increase aldactone per renal for RHF    pt stable for d/c home today  plan to c/w doxycycline to complete total 10d course

## 2024-04-18 NOTE — PROGRESS NOTE ADULT - SUBJECTIVE AND OBJECTIVE BOX
PROGRESS NOTE:   Authored by Dr. Fani Lopez MD (PGY-1). Pager [facility] [phone] / [place] ( [id number]) or via TEAMS    Patient is a 43y old  Female who presents with a chief complaint of Abdominal wall abscess (17 Apr 2024 17:24)      SUBJECTIVE / OVERNIGHT EVENTS:  No acute events overnight.     ADDITIONAL REVIEW OF SYSTEMS:  Patient denies fevers, chills, chest pain, shortness of breath, nausea, abdominal pain, diarrhea, constipation, dysuria, leg swelling, headache, light headedness.    MEDICATIONS  (STANDING):  chlorhexidine 2% Cloths 1 Application(s) Topical daily  doxycycline monohydrate Capsule 100 milliGRAM(s) Oral every 12 hours  furosemide    Tablet 20 milliGRAM(s) Oral <User Schedule>  ipratropium    for Nebulization 500 MICROGram(s) Nebulizer every 6 hours  mupirocin 2% Nasal 1 Application(s) Both Nostrils two times a day  pantoprazole    Tablet 40 milliGRAM(s) Oral before breakfast  predniSONE   Tablet 10 milliGRAM(s) Oral daily  Remodulin (treprostinil) SubQ Infusion 1 Dose(s) 1 Dose(s) SubCutaneous Continuous Pump  rivaroxaban 20 milliGRAM(s) Oral with dinner  sildenafil (REVATIO) 20 milliGRAM(s) Oral every 8 hours  spironolactone 50 milliGRAM(s) Oral daily    MEDICATIONS  (PRN):      CAPILLARY BLOOD GLUCOSE        I&O's Summary      PHYSICAL EXAM:  Vital Signs Last 24 Hrs  T(C): 36.9 (18 Apr 2024 05:21), Max: 36.9 (18 Apr 2024 00:18)  T(F): 98.4 (18 Apr 2024 05:21), Max: 98.4 (18 Apr 2024 00:18)  HR: 113 (18 Apr 2024 05:21) (91 - 113)  BP: 107/73 (18 Apr 2024 05:21) (97/66 - 119/75)  BP(mean): --  RR: 18 (18 Apr 2024 05:21) (17 - 18)  SpO2: 93% (18 Apr 2024 05:21) (93% - 97%)    Parameters below as of 18 Apr 2024 05:21  Patient On (Oxygen Delivery Method): nasal cannula  O2 Flow (L/min): 2      CONSTITUTIONAL: NAD, well-developed  RESPIRATORY: Normal respiratory effort; lungs are clear to auscultation bilaterally  CARDIOVASCULAR: Regular rate and rhythm, normal S1 and S2, no murmur/rub/gallop; No lower extremity edema; Peripheral pulses are 2+ bilaterally  ABDOMEN: Nontender to palpation, normoactive bowel sounds, no rebound/guarding; No hepatosplenomegaly  MSK: no clubbing or cyanosis of digits; no joint swelling or tenderness to palpation  PSYCH: A+O to person, place, and time; affect appropriate    LABS:                        12.2   10.18 )-----------( 346      ( 16 Apr 2024 08:25 )             40.6     04-16    138  |  106  |  8   ----------------------------<  98  3.6   |  21<L>  |  0.93    Ca    8.6      16 Apr 2024 08:25  Phos  2.9     04-16  Mg     1.8     04-16    TPro  7.1  /  Alb  3.7  /  TBili  0.6  /  DBili  x   /  AST  9<L>  /  ALT  <5<L>  /  AlkPhos  59  04-16          Urinalysis Basic - ( 16 Apr 2024 08:25 )    Color: x / Appearance: x / SG: x / pH: x  Gluc: 98 mg/dL / Ketone: x  / Bili: x / Urobili: x   Blood: x / Protein: x / Nitrite: x   Leuk Esterase: x / RBC: x / WBC x   Sq Epi: x / Non Sq Epi: x / Bacteria: x        Culture - Blood (collected 16 Apr 2024 07:55)  Source: .Blood Blood-Peripheral  Preliminary Report (17 Apr 2024 13:02):    No growth at 24 hours    Culture - Blood (collected 16 Apr 2024 07:45)  Source: .Blood Blood-Peripheral  Preliminary Report (17 Apr 2024 13:02):    No growth at 24 hours        Tele Reviewed:    RADIOLOGY & ADDITIONAL TESTS:  Results Reviewed:   Imaging Personally Reviewed:  Electrocardiogram Personally Reviewed:     PROGRESS NOTE:   Authored by Dr. Fani Lopez MD (PGY-1). Pager [facility] [phone] / [place] ( [id number]) or via TEAMS    Patient is a 43y old  Female who presents with a chief complaint of Abdominal wall abscess (17 Apr 2024 17:24)      SUBJECTIVE / OVERNIGHT EVENTS:  No acute events overnight. Wound care team provided packing and wound care. Pt slept well no complaints.     ADDITIONAL REVIEW OF SYSTEMS:  Patient denies fevers, chills, chest pain, shortness of breath, nausea, abdominal pain, diarrhea, constipation, dysuria, leg swelling, headache, light headedness.    MEDICATIONS  (STANDING):  chlorhexidine 2% Cloths 1 Application(s) Topical daily  doxycycline monohydrate Capsule 100 milliGRAM(s) Oral every 12 hours  furosemide    Tablet 20 milliGRAM(s) Oral <User Schedule>  ipratropium    for Nebulization 500 MICROGram(s) Nebulizer every 6 hours  mupirocin 2% Nasal 1 Application(s) Both Nostrils two times a day  pantoprazole    Tablet 40 milliGRAM(s) Oral before breakfast  predniSONE   Tablet 10 milliGRAM(s) Oral daily  Remodulin (treprostinil) SubQ Infusion 1 Dose(s) 1 Dose(s) SubCutaneous Continuous Pump  rivaroxaban 20 milliGRAM(s) Oral with dinner  sildenafil (REVATIO) 20 milliGRAM(s) Oral every 8 hours  spironolactone 50 milliGRAM(s) Oral daily    MEDICATIONS  (PRN):      CAPILLARY BLOOD GLUCOSE        I&O's Summary      PHYSICAL EXAM:  Vital Signs Last 24 Hrs  T(C): 36.9 (18 Apr 2024 05:21), Max: 36.9 (18 Apr 2024 00:18)  T(F): 98.4 (18 Apr 2024 05:21), Max: 98.4 (18 Apr 2024 00:18)  HR: 113 (18 Apr 2024 05:21) (91 - 113)  BP: 107/73 (18 Apr 2024 05:21) (97/66 - 119/75)  BP(mean): --  RR: 18 (18 Apr 2024 05:21) (17 - 18)  SpO2: 93% (18 Apr 2024 05:21) (93% - 97%)    Parameters below as of 18 Apr 2024 05:21  Patient On (Oxygen Delivery Method): nasal cannula  O2 Flow (L/min): 2      CONSTITUTIONAL: NAD, well-developed  RESPIRATORY: Normal respiratory effort; lungs are clear to auscultation bilaterally  CARDIOVASCULAR: Regular rate and rhythm, normal S1 and S2, no murmur/rub/gallop; No lower extremity edema; Peripheral pulses are 2+ bilaterally  ABDOMEN: Nontender to palpation, normoactive bowel sounds, no rebound/guarding; No hepatosplenomegaly  MSK: no clubbing or cyanosis of digits; no joint swelling or tenderness to palpation  PSYCH: A+O to person, place, and time; affect appropriate    LABS:                        12.2   10.18 )-----------( 346      ( 16 Apr 2024 08:25 )             40.6     04-16    138  |  106  |  8   ----------------------------<  98  3.6   |  21<L>  |  0.93    Ca    8.6      16 Apr 2024 08:25  Phos  2.9     04-16  Mg     1.8     04-16    TPro  7.1  /  Alb  3.7  /  TBili  0.6  /  DBili  x   /  AST  9<L>  /  ALT  <5<L>  /  AlkPhos  59  04-16          Urinalysis Basic - ( 16 Apr 2024 08:25 )    Color: x / Appearance: x / SG: x / pH: x  Gluc: 98 mg/dL / Ketone: x  / Bili: x / Urobili: x   Blood: x / Protein: x / Nitrite: x   Leuk Esterase: x / RBC: x / WBC x   Sq Epi: x / Non Sq Epi: x / Bacteria: x        Culture - Blood (collected 16 Apr 2024 07:55)  Source: .Blood Blood-Peripheral  Preliminary Report (17 Apr 2024 13:02):    No growth at 24 hours    Culture - Blood (collected 16 Apr 2024 07:45)  Source: .Blood Blood-Peripheral  Preliminary Report (17 Apr 2024 13:02):    No growth at 24 hours        Tele Reviewed:    RADIOLOGY & ADDITIONAL TESTS:  Results Reviewed:   Imaging Personally Reviewed:  Electrocardiogram Personally Reviewed:

## 2024-04-18 NOTE — PROGRESS NOTE ADULT - SUBJECTIVE AND OBJECTIVE BOX
SURGERY PROGRESS NOTE    Interval events  - No acute events overnight  - Vital signs stable (mildly tachy), afebrile, on room air.     OBJECTIVE:   Vital Signs Last 24 Hrs  T(C): 36.9 (18 Apr 2024 05:21), Max: 36.9 (18 Apr 2024 00:18)  T(F): 98.4 (18 Apr 2024 05:21), Max: 98.4 (18 Apr 2024 00:18)  HR: 113 (18 Apr 2024 05:21) (91 - 113)  BP: 107/73 (18 Apr 2024 05:21) (97/66 - 119/75)  BP(mean): --  RR: 18 (18 Apr 2024 05:21) (18 - 18)  SpO2: 93% (18 Apr 2024 05:21) (93% - 97%)    Parameters below as of 18 Apr 2024 05:21  Patient On (Oxygen Delivery Method): nasal cannula  O2 Flow (L/min): 2          I&O's Summary    I&O's Detail      MEDICATIONS  (STANDING):  chlorhexidine 2% Cloths 1 Application(s) Topical daily  doxycycline monohydrate Capsule 100 milliGRAM(s) Oral every 12 hours  furosemide    Tablet 20 milliGRAM(s) Oral <User Schedule>  ipratropium    for Nebulization 500 MICROGram(s) Nebulizer every 6 hours  mupirocin 2% Nasal 1 Application(s) Both Nostrils two times a day  pantoprazole    Tablet 40 milliGRAM(s) Oral before breakfast  predniSONE   Tablet 10 milliGRAM(s) Oral daily  Remodulin (treprostinil) SubQ Infusion 1 Dose(s) 1 Dose(s) SubCutaneous Continuous Pump  rivaroxaban 20 milliGRAM(s) Oral with dinner  sildenafil (REVATIO) 20 milliGRAM(s) Oral every 8 hours  spironolactone 50 milliGRAM(s) Oral daily    PHYSICAL EXAM  General: No acute distress, resting comfortably in bed.  Respiratory: Nonlabored respirations  Abdomen: soft, nondistended, nontender, LLQ wound with small amount of serosang staining of dressing, small amount of surrounding induration w/o fluctuance, no purulent drainage

## 2024-04-18 NOTE — PROGRESS NOTE ADULT - REASON FOR ADMISSION
Abdominal wall abscess

## 2024-04-18 NOTE — DISCHARGE NOTE NURSING/CASE MANAGEMENT/SOCIAL WORK - NSDCPEFALRISK_GEN_ALL_CORE
For information on Fall & Injury Prevention, visit: [email or web address] OR  [email or web address] OR  https://www.cdc.gov/steadi/patient.html

## 2024-04-18 NOTE — PROGRESS NOTE ADULT - ASSESSMENT
43-yo F w/ PMH pHTN on Remodulin and Xarelto, RA thrombus s/p thrombectomy, PE, and recurrent abdominal abscesses s/p multiple I&D, AVNRT s/p ablation, bradycardia s/p PPM (2016), and asthma, presenting for abdominal wall abscess. Abscess to LLQ i/s/o Remodulin pump insertion to LLQ. S/p I/D by ED. Primary team and ID concern for inadequate drainage. Surgery consulted.     Abscess is adequately drained.     Plan:   -No surgical intervention  -Management per wound care  -Care per primary team   -Reconsult PRN  - Surgery team to sign off, please contact with any questions, concerns.    Patient discussed with Attending Surgeon  ACS/Trauma Surgery  [id number]  
43 year old F with a history of pulmonary HTN (group I and IV on Rimodulin and Xarelto, on home 2L O2), RHF in the setting of PAH, RA thrombus s/p thrombectomy, history of PE (on Xarelto)  multiple abdominal wall abscesses  SP Hypophosphatemia     1 Renal - Phos improved   Volume status seems euvolemic at present;   Aldactone to 50mg po qd   No need for renal sono  2 CVS-Tachycardia is common for her   Not on bblockers   3 Pulm-Nasal canula and on remodulin;  No need for stress steroids   4 ID- 1/4 blood cx positive at present;  IV abx.  May need more formal drainage of abscess/imaging if cont to ooze     Sayed [facility]   [phone]       
43-yo F w/ PMH pHTN on Remodulin and Xarelto, RA thrombus s/p thrombectomy, PE, and recurrent abdominal abscesses s/p multiple I&D, AVNRT s/p ablation, bradycardia s/p PPM (2016), and asthma, presenting for abdominal wall abscess. Abscess to LLQ i/s/o Remodulin pump insertion to LLQ. Previously had RLQ abscess from the pump insertion there. S/p I&D in ED (4/14), abscess culture groews MRSA. Of note, patient had allergic reaction to vancomycin - thought to be Red man syndrome; upon repeat challenge w/ slow infusion resulted in pruritus w/o rash or erythema.    #Abdominal wall abscess  #MRSA infection  #Leukocytosis  #Blood culture positivity  - BCx positive with Staph epi (1 out of 4 bottles). Likely contaminant. Repeat BCx x2 sets AM.  - MRSA grew from LLQ abscess. On doxycycline. Switch from IV to  mg PO Q12H. Can provide 10-d doxycycline if discharged.  - Wound care following. Please have patient f/u outpatient wound care clinic.  - F/u fever curve and WBC    Plan discussed with primary team house staff.  Thank you for this consult.     Jon Martinez MD, PhD  Attending Physician  Division of Infectious Diseases  Department of Medicine    Please contact through MS Teams message.  Office: [phone] (after 5 PM or weekend).    
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 Pulm c/f pHTN and remodulin pump assistance.    #pHTN (groups 1+4) on 2L home O2 and s/p remodulin pump  #abd wall abscesses  #DVT/PE hx    - Pt currently at baseline without respiratory complaints  - d/w her outpt pulm Dr Yoon; no changes to regimen; please continue home sildenafil 20mg TID  - continue her home remodulin pump  - c/w Xarelto, home lasix, chronic pred  - agree with Doxy per ID  - Wound Cx + MRSA and Bcx + GPC in clusters -- f/up repeat from 4/16  - if pt ever off of steroids in future, would recd Allergy eval to evaluate penicillin allergy; denies ever having a rxn to it (instead has allergy to vancomycin - itchiness and hives)  - c/w  and recd Tele monitoring while on remodulin    Tesfaye Braun MD  Fellow, Pulmonary-Critical Care  
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 Pulm c/f pHTN and remodulin pump assistance.    #pHTN (groups 1+4) on 2L home O2 and s/p remodulin pump  #abd wall abscesses  #DVT/PE hx    - Pt currently at baseline without respiratory complaints  - d/w her outpt pulm Dr Yoon; no changes to regimen; please continue home sildenafil 20mg TID  - will d/w pharmacy to continue her home remodulin pump; she will need to sign a form authorizing use of her own pump  - c/w Xarelto, home lasix, chronic pred  - agree with Doxy per ID  - Wound Cx + staph aureus (and MRSA swab +) and Bcx + GPC in clusters -- f/up speciation  - if pt ever off of steroids in future, would recd Allergy eval to evaluate penicillin allergy; denies ever having a rxn to it (instead has allergy to vancomycin - itchiness and hives)  - c/w  and recd Tele monitoring while on remodulin    Tesfaye Braun MD  Fellow, Pulmonary-Critical Care
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

## 2024-04-18 NOTE — PROGRESS NOTE ADULT - PROBLEM SELECTOR PLAN 4
Right heart failure with normal LVEF in setting of PAH, appears euvolemic this admission, on home O2 2L requirement  TTE 8/23:  1. RV volume and pressure overload. Normal LV systolic fx w EF 55 to 60 %.   2. Mildly enlarged right ventricular cavity size and reduced systolic right ventricular function. 3. Device lead is visualized in the right heart.  RHC 7/23/20 RA 4, RV 80/4, PA 80/40/57, PCWP 22, LVEDP 4; CO/CI 4/2.0; PVR 11 (using EDP 13).   EKG this admission: Sinus tachycardia, R axis deviation, RVH,     Plan:  - on home spironolactone 25 mg daily, increase to 50mg 4/16 for optimization of volume status   -c/w home Lasix 20 mg MWF  - Replete electrolytes to K>4.0 and Mg >2.5

## 2024-04-18 NOTE — PROGRESS NOTE ADULT - PROBLEM SELECTOR PLAN 6
Diet: regular  DVT ppx: Xarelto  Dispo: pending clinical course

## 2024-04-18 NOTE — PROGRESS NOTE ADULT - PROBLEM SELECTOR PLAN 5
continue on home O2 2L NC,  atrovent, prednisone 10 mg daily    Plan:  -continue atrovent Q6  -continue prednisone 10 mg daily

## 2024-04-18 NOTE — PROGRESS NOTE ADULT - PROBLEM SELECTOR PLAN 1
Afebrile, VSS. Pt with history of prior abdominal wall abscesses iso Rimodulin pump, now with a new abscess CT A/P showing new 2.5 x 3 cm collection in L anterior abdominal wall s/p I&D in ED  Prior wound cx Jan 2023: MRSA staph lugdunensis, sensitive in the past to cefazolin and doxy  s/p cefazolin and doxy in ED    Plan:  -c/w doxycycline  mg BID (ID consulted and recommendations appreciated)   -MRSA positive in nares, decolonize with  bactroban   -abdominal abscess wound culture with MRSA, sensitive to tetracyclines    - surgery consulted 4/17 for possible incomplete drainage/ improved site care   -blood culture anaerobic bottle growing staph epidermis, repeat blood culture pending  - urine culture NGTD   - pt remains afebrile and without leukocytosis Afebrile, VSS. Pt with history of prior abdominal wall abscesses iso Rimodulin pump, now with a new abscess CT A/P showing new 2.5 x 3 cm collection in L anterior abdominal wall s/p I&D in ED  Prior wound cx Jan 2023: MRSA staph lugdunensis, sensitive in the past to cefazolin and doxy  s/p cefazolin and doxy in ED    Plan:  -c/w doxycycline  mg BID (ID consulted and recommendations appreciated)   -MRSA positive in nares, decolonize with  bactroban   -abdominal abscess wound culture with MRSA, sensitive to tetracyclines    - wound care team consulted with adequate wound dressing/ packing, surgery with no acute interventions   -blood culture anaerobic bottle growing staph epidermis, repeat blood culture NGTD  - urine culture NGTD   - pt remains afebrile and without leukocytosis

## 2024-04-18 NOTE — PROGRESS NOTE ADULT - PROVIDER SPECIALTY LIST ADULT
Infectious Disease
Nephrology
Pulmonology
Surgery
Internal Medicine
Pulmonology
Internal Medicine

## 2024-04-19 LAB
CULTURE RESULTS: SIGNIFICANT CHANGE UP
SPECIMEN SOURCE: SIGNIFICANT CHANGE UP

## 2024-04-21 LAB
CULTURE RESULTS: SIGNIFICANT CHANGE UP
CULTURE RESULTS: SIGNIFICANT CHANGE UP
SPECIMEN SOURCE: SIGNIFICANT CHANGE UP
SPECIMEN SOURCE: SIGNIFICANT CHANGE UP

## 2024-04-26 ENCOUNTER — APPOINTMENT (OUTPATIENT)
Dept: PULMONOLOGY | Facility: CLINIC | Age: 44
End: 2024-04-26

## 2024-04-26 ENCOUNTER — APPOINTMENT (OUTPATIENT)
Dept: PULMONOLOGY | Facility: CLINIC | Age: 44
End: 2024-04-26
Payer: MEDICAID

## 2024-04-26 DIAGNOSIS — J45.909 UNSPECIFIED ASTHMA, UNCOMPLICATED: ICD-10-CM

## 2024-04-26 DIAGNOSIS — I27.20 PULMONARY HYPERTENSION, UNSPECIFIED: ICD-10-CM

## 2024-04-26 DIAGNOSIS — J84.9 INTERSTITIAL PULMONARY DISEASE, UNSPECIFIED: ICD-10-CM

## 2024-04-26 PROCEDURE — 99443: CPT

## 2024-04-26 NOTE — HISTORY OF PRESENT ILLNESS
[Home] : at home, [unfilled] , at the time of the visit. [Medical Office: ([facility])___] : at the medical office located in  [Verbal consent obtained from patient] : the patient, [unfilled] [TextBox_4] : --43  female----------Patient is here for follow up of her pulmonary htn. ------------------INITIALLY  REFD  WITH   ECHO [DONE  OUTSIDE ] ELEVATED PASP  DILATED RV AND RA---------  HAS BEEN TOLD IN THE PAST SHE HAS ??? ASTHMA-----long-standing history of dyspnea on exertion-----------------has baseline tachycardia ----...--s. No history of liver dysfunction , collagen vascular disorder or chronic thromboembolic disease. I would classify her dyspnea as WHO  FUNCTIONAL CLASS III-----------no calf tenderness----------SLEEP STUDY SHOWS AHI 0.6 but T 90  < 35 %-----DIAGNOSED  AS PAH ---------WAS ON ADMAPAS  NOW ON   SQ REMODULIN  [ SINCE MAY 2018]  ---S/P PACEMAKER PLACEMENT AT [place]  AND ON METOPROLOL-----   2/3/2022 tested positive on home covid test 2/2/2022- developed shortness of breath, nausea and vomitting- son is positive She is vaccinated for covid but not boosted Afebrile, drinking but not eating well. O2 sat 94% room air rest- 92% room air w/walk  2/7/2022 covid pcr negative - did not get MAB still with SOUZA - on prednisone with taper  4/2022 6mwt o2 sat 95% to 94% on oxygen 69meters (135m) stopped d/t severe SOB and elevated HR  4/15/2022  pulm htn - on remodulin 36 ng (having jaw pain sometimes), sildenafil 10 mg qd, furosemide 20 mg 5x weekly which helped w/edema. She is also having palpitations- has not yet followed up with DR Chavis in cardiology. Asthma-occas wheezing- awaiting start of dupixent Up to date w/covid vac- declines influenza vac. s/p consult with lung transplant team but needs to lower BMI before being considered- pt has been referred to nutritionist but has not scheduled appt   5/31/22- pulm htn - on remodulin 36 ng SQ (having jaw pain sometimes), sildenafil 10 mg qd, and diuretics daily- doing well from pulm perspectives Asthma currently under control- declining biologic therapy Was following lung  transplant team but currently on hold d/t BMI   7/11/22- pulm htn--on remodulin 36 ng SQ (having jaw pain sometimes), sildenafil 10 mg qd and diuretics daily [LASIX WO MG---FAZAL  was in the hospital at [facility]-- given Lasix --- we will slowly increase her dose of Remodulin  7/22/2022 pulm htn- breathing improved on higher dose of remodulin 40ng, having some diarrhea. Palpitations improved  9/15 /2022 ----s/p hospitalization for cardiogenic shock due to RV failure with markers of end organ dysfunction, started on  as well as Marcela. CTA negative for PE but TTE revealed RA thrombus with concern for clot in transit. She underwent catheter directed partial thrombus aspiration 8/31, course complicated by L fem pseudoaneurysm for which she received thrombin injection on 9/3. Course further complicated by S. Lugdunensis bacteremia on BCx from 8/29 s/p drainage of abscess at prior----on IV antibiotics as per ID Remodulin subcutaneous access site.--42 ng/kg/min , also on sildenafil 20 mg 3 times daily ----   SEPT 27 2022-----feels much better on subcu Remodulin 44 ng/kg/min and sildenafil 20 mg 3 times daily on diuretics-on anticoagulation-------- needs to see vascular for left femoral pseudoaneurysm--- H/O---S.Lugdunensis bacteremia on BCx from 8/29 s/p drainage of abscess at prior----on IV antibiotics as per ID --------ALSO HAS Lleft   fem pseudoaneurysm---needs follow up by  vasular ---- --received flu vaccine today------------------------  October 2022: Telehealth Visit via teledoc S/P hospitalization for cardiogenic shock c/b bacteremia on IV antibiotics. Followed by ID (Dr. Grover) for labs, antibiotics now d/c'd (10/6)  getting follow up labs w/ID (results pending from 10/19) Pulm htn -She reports use of Remodulin 44 ng/kg/min, tolerating new dose well  and Sildenafil (20 mg t.i.d PO) No resp complaints, no palpitations, follows cardiology Dr Paramjit Chavis. On diuretics, xarelto, O2 as needed s/p lung transplant eval- was told she was "too well" to be listed and needs to work on getting her BMI down  jan 2022--developed abdominal wall abscess--needs surgical drainage--------subcu Remodulin 44 ng/kg/min and sildenafil 20 mg 3 times daily on diuretics-on anticoagulation--  mario    10 2023--- recent admission to the hospital for abscess on abdominal wall which was drained and received antibiotics and it has healed----------------subcu Remodulin 44 ng/kg/min and sildenafil 20 mg 3 times daily on diuretics-on anticoagulation--I would like to increase Remodulin to 45 ng/kg/min--------- having some URI symptoms we will send a nasal swab continue diuretics----   3/2023---seems stable  pulm htn -remains on remodulin 44ng and sildenafil, diuretics per heart failure Dr Chavis c/o worsening SOUZA and nausea x 1week, chroic diarrhea which she attributes to remodulin, and lower abd pain ///??? pancreatitis Tacchycardia has improved, on continuous oxygen Accompanied by    5/2023 accompanied by  pulm htn -remains on remodulin 44ng and sildenafil, diuretics per heart failure Dr Chavis old remodulin site healing w/minimal drainage (previously treated w/antibiotics)  c/o nausea, lightheadedness, headache  june 2023--------accompanied by  pulm htn -remains on remodulin 46ng and sildenafil, diuretics per heart failure Dr Chavis -feesl btter--- less  nausea,---will increase   Remodulin to to 48 ng/kg/min  8/2023 43-year-old female w/ PMH of pulmonary HTN c/b right heart failure, multiple abdominal wall abscesses, right atrial thrombus s/p thrombectomy, asthma, anemia B WAS  admitted for right heart failure exacerbation in s/o pulmonary HTN. IN AUGUST 2023----  -  Problem: Right heart failure.-----  Plan: - In s/o pulmonary HTN - On remodulin, slidenafil, xarelto at home - Lasix 40mg qid, spironolactone 25 mg qd at home - On 2L O2 at home - - S/p diuresis with 40 mg IV lasix qd, per HF -- TTE (8/14) -> EF 55-60%, RV overload -- RHC (8/16) -> Elevated RV pressure c/w pulmonary HTN, elevated PCWP  [ BELIEVE THIS WEDGE PRESSURE IS ERRONEOUS AS PT HAS PREVIOSU EVDIECNE OF PRECAPILLARY PULMONARY HTN ] - - - - -  -At discharge patient was switched to  PO torsemide 20mg qd, per HF -------, using oxygen.   ------Pulm htn On remodulin 48ng- c/o diarrhea remains on sildenafil 20mg tid   12/2023 accomapnied by  , using oxygen.   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     Problem/Plan - 1: -  Problem: Abdominal wall abscess. -  Plan: Afebrile, VSS. Pt with history of prior abdominal wall abscesses iso Rimodulin pump, now with a new abscess CT A/P showing new 2.5 x 3 cm collection in L anterior abdominal wall s/p I&D in ED Prior wound cx Jan 2023: MRSA staph lugdunensis, sensitive in the past to cefazolin and doxy s/p cefazolin and doxy    4/2024 TTM with pt has menstrual cycle today and unable to come in to office c/o dyspnea- using oxygen at 3 lpm-slight dry cough and reports some wheezing - - - Remodulin SQ at 48ng- site cleaned/dressed by home RN [TextBox_100] : 1/2014 [TextBox_108] : 0.9 [TextBox_112] : 35.3

## 2024-04-26 NOTE — DISCUSSION/SUMMARY
[FreeTextEntry1] : ----Assessment and Plan--------43 year-old lady with PULMONARY ARTERIAL HYPERTENSION S/P PPM FOR TACHYCARDIA AT [place] ON REMODULIN - S/P LEFT BREAST BIOPSY   doing well on remodulin 38 ng ----- ON XARELTO, LASIX AND ALDACTONE -------LOW DOSE PREDNISONE -----is following up with at [facility] for transplant listing-----final decision after the  and psych evaluation  JAN 2023---- ABDOMINAL WALL ABSCESS-----needED surgical drainage-doneE MARCH 2023   --- pain abdomen rule out pancreatitis AUGUST 2023--- admitted with fluid overload was treated with diuretics  1. PULMONARY ART HTN   ---- WHO GROUP I FUNCTIONAL CLASS III ----  [AS PER DR FREEMAN WHO DID PULM ANGIO SHE HAS FEATURES OF GROUP I WITH SOME   EVIDENCE OF DISTAL CLOTS]   She is on Remodulin 48 NG/KG/MIN  -pt declines further uptitration  Continue  Sildenafil 20mg qd, Xarelto, ON torsemide  AND ALDACTONE 25 MG - f/u with heart failure team  2. Asthma -. use  ipratropium and levalbuterol and budesonide. Refuses use of DUPIXENT at this time as she reports her allergies are "not that bad". Has has been previously  steroid dependent likely contributing to her weight. She is hesitant to start biologic injections. --short course of prednisone contd inhalers-  Has discontinued Flonase and Dymysta nasal spray     3. High BMI (41.9 kg/m2)   Precluding lung transplant   Referred to nutritionist------ extensive nutritional advice given--- she promises to follow-up on thAT   Weight loss program advised  4. Oxygen medically necessary given severe Pulm Htn, anemia and h/o nocturnal desaturation which could worsen Pulm HTN. Advised oxygen 2-3 lpm x 24hrs, take pred 10mg  5. She is on Xarelto---will be on lifelong anticoagulation---  6) She has features of ERICA- including sleep disturbance, nocturnal desat, and excessive fatigue- HST was negative for ERICA in 2014 but had desaturation. Will try to repeat HST at next visit  8. Follows w/[place] [facility] Team----  ---Also consulted with Dr Lou at [facility]---   As per patient, Dr Luo feels she is not a candidate at this time because of good health   Although Dr. Lou advised needs to lose wt to be considered for transplant listing in future  9.labs to be drawn at [facility] lab 10- in office fu visit in next few weeks  Vivian Yoon MD, [facility]

## 2024-04-26 NOTE — REVIEW OF SYSTEMS
[Fever] : no fever [Chills] : no chills [Dry Eyes] : no dry eyes [Cough] : no cough [Hemoptysis] : no hemoptysis [Sputum] : no sputum [Chest Discomfort] : no chest discomfort [Nasal Discharge] : no nasal discharge [Abdominal Pain] : no abdominal pain [Nausea] : no nausea [Vomiting] : no vomiting [Nocturia] : no nocturia [Arthralgias] : no arthralgias [Myalgias] : no myalgias [Raynaud] : no raynaud [Headache] : no headache [Focal Weakness] : no focal weakness [Numbness] : no numbness [Depression] : no depression [Anxiety] : no anxiety [Diabetes] : no diabetes

## 2024-04-30 LAB
ALBUMIN SERPL ELPH-MCNC: 4.1 G/DL
ALP BLD-CCNC: 67 U/L
ALT SERPL-CCNC: 9 U/L
ANION GAP SERPL CALC-SCNC: 13 MMOL/L
AST SERPL-CCNC: 11 U/L
BILIRUB SERPL-MCNC: 0.7 MG/DL
BUN SERPL-MCNC: 8 MG/DL
CALCIUM SERPL-MCNC: 9 MG/DL
CHLORIDE SERPL-SCNC: 107 MMOL/L
CO2 SERPL-SCNC: 20 MMOL/L
CREAT SERPL-MCNC: 0.99 MG/DL
EGFR: 73 ML/MIN/1.73M2
GLUCOSE SERPL-MCNC: 82 MG/DL
HCT VFR BLD CALC: 41.9 %
HGB BLD-MCNC: 12.6 G/DL
MCHC RBC-ENTMCNC: 20.5 PG
MCHC RBC-ENTMCNC: 30.1 GM/DL
MCV RBC AUTO: 68.2 FL
NT-PROBNP SERPL-MCNC: 294 PG/ML
PLATELET # BLD AUTO: 425 K/UL
POTASSIUM SERPL-SCNC: 4 MMOL/L
PROT SERPL-MCNC: 6.7 G/DL
RBC # BLD: 6.14 M/UL
RBC # FLD: 20.6 %
SODIUM SERPL-SCNC: 140 MMOL/L
WBC # FLD AUTO: 8.42 K/UL

## 2024-04-30 NOTE — H&P ADULT - CARDIOVASCULAR
----- Message from Berry Gil MD sent at 4/30/2024  8:06 AM CDT -----    ----- Message -----  From: Lab, Background User  Sent: 4/27/2024   8:57 AM CDT  To: RACHEL Garcia Results Pool       negative normal/regular rate and rhythm/S1 S2 present/no gallops/no rub/no murmur

## 2024-05-21 RX ORDER — FUROSEMIDE 40 MG/1
40 TABLET ORAL
Qty: 45 | Refills: 0 | Status: ACTIVE | COMMUNITY
Start: 1900-01-01 | End: 1900-01-01

## 2024-05-21 RX ORDER — OMEPRAZOLE 20 MG/1
20 CAPSULE, DELAYED RELEASE ORAL
Qty: 3 | Refills: 0 | Status: ACTIVE | COMMUNITY
Start: 2023-05-30 | End: 1900-01-01

## 2024-05-21 RX ORDER — RIVAROXABAN 20 MG/1
20 TABLET, FILM COATED ORAL
Qty: 30 | Refills: 0 | Status: ACTIVE | COMMUNITY
Start: 2023-08-19 | End: 1900-01-01

## 2024-05-21 RX ORDER — SILDENAFIL 20 MG/1
20 TABLET ORAL
Qty: 90 | Refills: 0 | Status: ACTIVE | COMMUNITY
Start: 2023-04-10 | End: 1900-01-01

## 2024-05-21 RX ORDER — MONTELUKAST 10 MG/1
10 TABLET, FILM COATED ORAL
Qty: 1 | Refills: 0 | Status: ACTIVE | COMMUNITY
Start: 2021-11-08 | End: 1900-01-01

## 2024-05-21 RX ORDER — IPRATROPIUM BROMIDE 0.5 MG/2.5ML
0.02 SOLUTION RESPIRATORY (INHALATION) 4 TIMES DAILY
Qty: 120 | Refills: 5 | Status: ACTIVE | COMMUNITY
Start: 2021-10-07 | End: 1900-01-01

## 2024-05-21 RX ORDER — SPIRONOLACTONE 25 MG/1
25 TABLET ORAL
Qty: 90 | Refills: 1 | Status: ACTIVE | COMMUNITY
Start: 2021-05-06 | End: 1900-01-01

## 2024-05-28 RX ORDER — TREPROSTINIL 100 MG/20ML
100 INJECTION, SOLUTION INTRAVENOUS; SUBCUTANEOUS
Qty: 60 | Refills: 11 | Status: ACTIVE | COMMUNITY
Start: 2020-03-24 | End: 1900-01-01

## 2024-06-07 ENCOUNTER — APPOINTMENT (OUTPATIENT)
Dept: PULMONOLOGY | Facility: CLINIC | Age: 44
End: 2024-06-07

## 2024-06-17 ENCOUNTER — NON-APPOINTMENT (OUTPATIENT)
Age: 44
End: 2024-06-17

## 2024-06-17 NOTE — HISTORY OF PRESENT ILLNESS
[Never] : never [TextBox_4] : --43  female----------Patient is here for follow up of her pulmonary htn. ------------------INITIALLY  REFD  WITH   ECHO [DONE  OUTSIDE ] ELEVATED PASP  DILATED RV AND RA---------  HAS BEEN TOLD IN THE PAST SHE HAS ??? ASTHMA-----long-standing history of dyspnea on exertion-----------------has baseline tachycardia ----...--s. No history of liver dysfunction , collagen vascular disorder or chronic thromboembolic disease. I would classify her dyspnea as WHO  FUNCTIONAL CLASS III-----------no calf tenderness----------SLEEP STUDY SHOWS AHI 0.6 but T 90  < 35 %-----DIAGNOSED  AS PAH ---------WAS ON ADMAPAS  NOW ON   SQ REMODULIN  [ SINCE MAY 2018]  ---S/P PACEMAKER PLACEMENT AT [place]  AND ON METOPROLOL-----   2/3/2022 tested positive on home covid test 2/2/2022- developed shortness of breath, nausea and vomitting- son is positive She is vaccinated for covid but not boosted Afebrile, drinking but not eating well. O2 sat 94% room air rest- 92% room air w/walk  2/7/2022 covid pcr negative - did not get MAB still with SOUZA - on prednisone with taper  4/2022 6mwt o2 sat 95% to 94% on oxygen 69meters (135m) stopped d/t severe SOB and elevated HR  4/15/2022  pulm htn - on remodulin 36 ng (having jaw pain sometimes), sildenafil 10 mg qd, furosemide 20 mg 5x weekly which helped w/edema. She is also having palpitations- has not yet followed up with DR Chavis in cardiology. Asthma-occas wheezing- awaiting start of dupixent Up to date w/covid vac- declines influenza vac. s/p consult with lung transplant team but needs to lower BMI before being considered- pt has been referred to nutritionist but has not scheduled appt   5/31/22- pulm htn - on remodulin 36 ng SQ (having jaw pain sometimes), sildenafil 10 mg qd, and diuretics daily- doing well from pulm perspectives Asthma currently under control- declining biologic therapy Was following lung  transplant team but currently on hold d/t BMI   7/11/22- pulm htn--on remodulin 36 ng SQ (having jaw pain sometimes), sildenafil 10 mg qd and diuretics daily [LASIX WO MG---FAZAL  was in the hospital at [facility]-- given Lasix --- we will slowly increase her dose of Remodulin  7/22/2022 pulm htn- breathing improved on higher dose of remodulin 40ng, having some diarrhea. Palpitations improved  9/15 /2022 ----s/p hospitalization for cardiogenic shock due to RV failure with markers of end organ dysfunction, started on  as well as Marcela. CTA negative for PE but TTE revealed RA thrombus with concern for clot in transit. She underwent catheter directed partial thrombus aspiration 8/31, course complicated by L fem pseudoaneurysm for which she received thrombin injection on 9/3. Course further complicated by S. Lugdunensis bacteremia on BCx from 8/29 s/p drainage of abscess at prior----on IV antibiotics as per ID Remodulin subcutaneous access site.--42 ng/kg/min , also on sildenafil 20 mg 3 times daily ----   SEPT 27 2022-----feels much better on subcu Remodulin 44 ng/kg/min and sildenafil 20 mg 3 times daily on diuretics-on anticoagulation-------- needs to see vascular for left femoral pseudoaneurysm--- H/O---S.Lugdunensis bacteremia on BCx from 8/29 s/p drainage of abscess at prior----on IV antibiotics as per ID --------ALSO HAS Lleft   fem pseudoaneurysm---needs follow up by  vasular ---- --received flu vaccine today------------------------  October 2022: Telehealth Visit via teledoc S/P hospitalization for cardiogenic shock c/b bacteremia on IV antibiotics. Followed by ID (Dr. Grover) for labs, antibiotics now d/c'd (10/6)  getting follow up labs w/ID (results pending from 10/19) Pulm htn -She reports use of Remodulin 44 ng/kg/min, tolerating new dose well  and Sildenafil (20 mg t.i.d PO) No resp complaints, no palpitations, follows cardiology Dr Paramjit Chavis. On diuretics, xarelto, O2 as needed s/p lung transplant eval- was told she was "too well" to be listed and needs to work on getting her BMI down  jan 2022--developed abdominal wall abscess--needs surgical drainage--------subcu Remodulin 44 ng/kg/min and sildenafil 20 mg 3 times daily on diuretics-on anticoagulation--  mario    10 2023--- recent admission to the hospital for abscess on abdominal wall which was drained and received antibiotics and it has healed----------------subcu Remodulin 44 ng/kg/min and sildenafil 20 mg 3 times daily on diuretics-on anticoagulation--I would like to increase Remodulin to 45 ng/kg/min--------- having some URI symptoms we will send a nasal swab continue diuretics----   3/2023---seems stable  pulm htn -remains on remodulin 44ng and sildenafil, diuretics per heart failure Dr Chavis c/o worsening SOUZA and nausea x 1week, chroic diarrhea which she attributes to remodulin, and lower abd pain ///??? pancreatitis Tacchycardia has improved, on continuous oxygen Accompanied by    5/2023 accompanied by  pulm htn -remains on remodulin 44ng and sildenafil, diuretics per heart failure Dr Chavis old remodulin site healing w/minimal drainage (previously treated w/antibiotics)  c/o nausea, lightheadedness, headache  june 2023--------accompanied by  pulm htn -remains on remodulin 46ng and sildenafil, diuretics per heart failure Dr Chavis -feesl btter--- less  nausea,---will increase   Remodulin to to 48 ng/kg/min  8/2023 43-year-old female w/ PMH of pulmonary HTN c/b right heart failure, multiple abdominal wall abscesses, right atrial thrombus s/p thrombectomy, asthma, anemia B WAS  admitted for right heart failure exacerbation in s/o pulmonary HTN. IN AUGUST 2023----  -  Problem: Right heart failure.-----  Plan: - In s/o pulmonary HTN - On remodulin, slidenafil, xarelto at home - Lasix 40mg qid, spironolactone 25 mg qd at home - On 2L O2 at home - - S/p diuresis with 40 mg IV lasix qd, per HF -- TTE (8/14) -> EF 55-60%, RV overload -- RHC (8/16) -> Elevated RV pressure c/w pulmonary HTN, elevated PCWP  [ BELIEVE THIS WEDGE PRESSURE IS ERRONEOUS AS PT HAS PREVIOSU EVDIECNE OF PRECAPILLARY PULMONARY HTN ] - - - - -  -At discharge patient was switched to  PO torsemide 20mg qd, per HF -------, using oxygen.   ------Pulm htn On remodulin 48ng- c/o diarrhea remains on sildenafil 20mg tid   12/2023 accomapnied by  , using oxygen.   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     Problem/Plan - 1: -  Problem: Abdominal wall abscess. -  Plan: Afebrile, VSS. Pt with history of prior abdominal wall abscesses iso Rimodulin pump, now with a new abscess CT A/P showing new 2.5 x 3 cm collection in L anterior abdominal wall s/p I&D in ED Prior wound cx Jan 2023: MRSA staph lugdunensis, sensitive in the past to cefazolin and doxy s/p cefazolin and doxy    4/2024 TTM with pt has menstrual cycle today and unable to come in to office c/o dyspnea- using oxygen at 3 lpm-slight dry cough and reports some wheezing - - - Remodulin SQ at 48ng- site cleaned/dressed by home RN [TextBox_100] : 1/2014 [TextBox_108] : 0.9 [TextBox_112] : 35.3

## 2024-06-17 NOTE — DISCUSSION/SUMMARY
[FreeTextEntry1] : ----Assessment and Plan--------43 year-old lady with PULMONARY ARTERIAL HYPERTENSION S/P PPM FOR TACHYCARDIA AT [place] ON REMODULIN - S/P LEFT BREAST BIOPSY   doing well on remodulin 38 ng ----- ON XARELTO, LASIX AND ALDACTONE -------LOW DOSE PREDNISONE -----is following up with at [facility] for transplant listing-----final decision after the  and psych evaluation  JAN 2023---- ABDOMINAL WALL ABSCESS-----needED surgical drainage-doneE MARCH 2023   --- pain abdomen rule out pancreatitis AUGUST 2023--- admitted with fluid overload was treated with diuretics  1. PULMONARY ART HTN   ---- WHO GROUP I FUNCTIONAL CLASS III ----  [AS PER DR FREEMAN WHO DID PULM ANGIO SHE HAS FEATURES OF GROUP I WITH SOME   EVIDENCE OF DISTAL CLOTS]   She is on Remodulin 48 NG/KG/MIN  -pt declines further uptitration  Continue  Sildenafil 20mg qd, Xarelto, ON torsemide  AND ALDACTONE 25 MG - f/u with heart failure team  2. Asthma -. use  ipratropium and levalbuterol and budesonide. Refuses use of DUPIXENT at this time as she reports her allergies are "not that bad". Has has been previously  steroid dependent likely contributing to her weight. She is hesitant to start biologic injections. --short course of prednisone contd inhalers-  Has discontinued Flonase and Dymysta nasal spray     3. High BMI (41.9 kg/m2)   Precluding lung transplant   Referred to nutritionist------ extensive nutritional advice given--- she promises to follow-up on thAT   Weight loss program advised  4. Oxygen medically necessary given severe Pulm Htn, anemia and h/o nocturnal desaturation which could worsen Pulm HTN. Advised oxygen 2-3 lpm x 24hrs, take pred 10mg  5. She is on Xarelto---will be on lifelong anticoagulation---  6) She has features of ERICA- including sleep disturbance, nocturnal desat, and excessive fatigue- HST was negative for ERICA in 2014 but had desaturation. Will try to repeat HST at next visit  8. Follows w/[place] [facility] Team----  ---Also consulted with Dr Lou at [facility]---   As per patient, Dr Lou feels she is not a candidate at this time because of good health   Although Dr. Lou advised needs to lose wt to be considered for transplant listing in future  9.labs to be drawn at [facility] lab 10- in office fu visit in next few weeks  Vivian Yoon MD, [facility]

## 2024-06-18 ENCOUNTER — APPOINTMENT (OUTPATIENT)
Dept: ELECTROPHYSIOLOGY | Facility: CLINIC | Age: 44
End: 2024-06-18
Payer: MEDICAID

## 2024-06-18 PROCEDURE — 93294 REM INTERROG EVL PM/LDLS PM: CPT

## 2024-06-18 PROCEDURE — 93296 REM INTERROG EVL PM/IDS: CPT

## 2024-07-02 ENCOUNTER — NON-APPOINTMENT (OUTPATIENT)
Age: 44
End: 2024-07-02

## 2024-07-03 ENCOUNTER — RX RENEWAL (OUTPATIENT)
Age: 44
End: 2024-07-03

## 2024-07-16 ENCOUNTER — RESULT REVIEW (OUTPATIENT)
Age: 44
End: 2024-07-16

## 2024-07-19 ENCOUNTER — APPOINTMENT (OUTPATIENT)
Dept: PULMONOLOGY | Facility: CLINIC | Age: 44
End: 2024-07-19

## 2024-07-20 ENCOUNTER — TRANSCRIPTION ENCOUNTER (OUTPATIENT)
Age: 44
End: 2024-07-20

## 2024-07-29 NOTE — CONSULT NOTE ADULT - ATTENDING COMMENTS
DATE OF SERVICE: 01-03-23    42F with PMHx as listed above with current use of remodulin pump with history of cutaneous abscesses at pump site who presents with new RLQ abscess. Reports exquisite tenderness at the site, similar to prior abscesses    Labs reviewed, elevated WBC 14.6 at admission  CT reviewed, RLQ abscess    for bedside I/D of abscess  Medical admission/care  Abx
Agree with above  Pulmonary hypertension (WHO Class I and IV) on treprostinil and Xarelto a/w chronic respiratory failure on oxygen  Now with recurrent abdominal abscesses  Here for surgical I&D of same  Continue current management  Avoid general anesthesia  Please call if any questions
42 m with pulmHTN on 2L O2, on Remodulin pump, ILD, PPM, RA thrombus on xarelto s/p thrombectomy, multiple skin abscess, recent admission in august/September 2022 for abdominal skin abscess with MRSA and bacteremia with Staph Lugdunensis treated with doxy for 10 days and 4 weeks of Cefazolin, TTE did not show vegetation but MYRIAM was not done, presented on 1/3 with abdominal wall swelling.  Afebrile  Leukocytosis 14K --> 15K  CT AP on 1/3: 4.6 cm right lower quadrant anterior abdominal wall abscess, Left lower quadrant abdominal wall skin thickening with tiny associated subcutaneous collection and overlying medical device  S/p I&D by surgery on 1/4 with bloody and purulent drainage     recurrent abd wall abscess previously with MRSA and had h/o staph lugdunencis bacteremia s/p 4 weeks of cefazolin, has a PPM as well (no MYRIAM was done last time)  pt had itching due to vanco, ?real reaction vs sai    * f/u the abscess cx  * f/u the blood cx  * discussed with pt will start vanco 1g over 2 hours and if tolerates then will do vanco 1 q 12 slow infusion  * if pt does not tolerate vanco, start doxy 100 bid     The above assessment and plan was discussed with the primary team    Harper Becerra MD  contact on teams  After 5pm and on weekends call [phone]
right breast / chest / back/done

## 2024-08-09 ENCOUNTER — APPOINTMENT (OUTPATIENT)
Age: 44
End: 2024-08-09

## 2024-08-22 ENCOUNTER — NON-APPOINTMENT (OUTPATIENT)
Age: 44
End: 2024-08-22

## 2024-08-23 ENCOUNTER — NON-APPOINTMENT (OUTPATIENT)
Age: 44
End: 2024-08-23

## 2024-08-23 ENCOUNTER — APPOINTMENT (OUTPATIENT)
Dept: PULMONOLOGY | Facility: CLINIC | Age: 44
End: 2024-08-23
Payer: MEDICAID

## 2024-08-23 ENCOUNTER — LABORATORY RESULT (OUTPATIENT)
Age: 44
End: 2024-08-23

## 2024-08-23 VITALS
HEART RATE: 121 BPM | OXYGEN SATURATION: 91 % | DIASTOLIC BLOOD PRESSURE: 82 MMHG | BODY MASS INDEX: 36.32 KG/M2 | WEIGHT: 218 LBS | SYSTOLIC BLOOD PRESSURE: 125 MMHG | HEIGHT: 65 IN

## 2024-08-23 DIAGNOSIS — J45.909 UNSPECIFIED ASTHMA, UNCOMPLICATED: ICD-10-CM

## 2024-08-23 DIAGNOSIS — D64.9 ANEMIA, UNSPECIFIED: ICD-10-CM

## 2024-08-23 PROCEDURE — 99214 OFFICE O/P EST MOD 30 MIN: CPT | Mod: 25

## 2024-08-23 PROCEDURE — 96372 THER/PROPH/DIAG INJ SC/IM: CPT

## 2024-08-23 RX ORDER — PREDNISONE 10 MG/1
10 TABLET ORAL DAILY
Qty: 14 | Refills: 1 | Status: ACTIVE | COMMUNITY
Start: 2024-08-23 | End: 1900-01-01

## 2024-08-23 NOTE — END OF VISIT
[FreeTextEntry3] :   I, Dr. Vivian Yoon  personally performed the evaluation and management (E/M) services for this established patient who presents today with (a) new problem(s)/exacerbation of (an) existing condition(s). That E/M includes conducting the clinically appropriate interval history &/or exam, assessing all new/exacerbated conditions, and establishing a new plan of care. Today, my NAYELI, Aminah Monroy NP, , was here to observe my evaluation and management service for this new problem/exacerbated condition and follow the plan of care established by me going forward. [Time Spent: ___ minutes] : I have spent [unfilled] minutes of time on the encounter which excludes teaching and separately reported services.

## 2024-08-23 NOTE — DISCUSSION/SUMMARY
[FreeTextEntry1] : ----Assessment and Plan--------44 year-old lady with PULMONARY ARTERIAL HYPERTENSION S/P PPM FOR TACHYCARDIA AT [place] ON REMODULIN - S/P LEFT BREAST BIOPSY   doing well on remodulin 38 ng ----- ON XARELTO, LASIX AND ALDACTONE -------LOW DOSE PREDNISONE -----is following up with at [facility] for transplant listing-----final decision after the  and psych evaluation  JAN 2023---- ABDOMINAL WALL ABSCESS-----needED surgical drainage-doneE MARCH 2023   --- pain abdomen rule out pancreatitis AUGUST 2023--- admitted with fluid overload was treated with diuretics  1. PULMONARY ART HTN   ---- WHO GROUP I FUNCTIONAL CLASS III ----  [AS PER DR FREEMAN WHO DID PULM ANGIO SHE HAS FEATURES OF GROUP I WITH SOME   EVIDENCE OF DISTAL CLOTS]   She is on Remodulin 48 NG/KG/MIN  -pt declines further uptitration  Continue  Sildenafil 20mg,  Xarelto, ON torsemide  AND ALDACTONE 25 MG - f/u with heart failure team  2. Asthma -. use  ipratropium and levalbuterol and budesonide. Refuses use of DUPIXENT at this time   s/p medrol 20mg IM in office today- to take pred 10mg daily ----Advised patient to reconsider Dupixent  3. High BMI (41.9 kg/m2)   Precluding lung transplant   Referred to nutritionist------ extensive nutritional advice given---   Weight loss program advised- is considering ozempic  4. Oxygen medically necessary given severe Pulm Htn, anemia and h/o nocturnal desaturation which could worsen Pulm HTN. Advised oxygen 2-3 lpm x 24hrs, take pred 10mg  5. She is on Xarelto---will be on lifelong anticoagulation---  6) She has features of ERICA- including sleep disturbance, nocturnal desat, and excessive fatigue- HST was negative for ERICA in 2014 but had desaturation. Will try to repeat HST at next visit  8. Follows /[place] [facility]----  ---[facility] with Dr Lou at [facility]---   As per patient, Dr Lou feels she is not a candidate at this time because of good health   Although Dr. Lou advised needs to lose wt to be considered for transplant listing in future  9.labs to be drawn in office today  10- in office fu visit in next few weeks to uptitrate remodulin in office and observe  Vivian Yoon MD, [facility]

## 2024-08-23 NOTE — HISTORY OF PRESENT ILLNESS
[Never] : never [TextBox_4] : --44  female----------Patient is here for follow up of her pulmonary htn. ------------------INITIALLY  REFD  WITH   ECHO [DONE  OUTSIDE ] ELEVATED PASP  DILATED RV AND RA---------  HAS BEEN TOLD IN THE PAST SHE HAS ??? ASTHMA-----long-standing history of dyspnea on exertion-----------------has baseline tachycardia ----...--s. No history of liver dysfunction , collagen vascular disorder or chronic thromboembolic disease. I would classify her dyspnea as WHO  FUNCTIONAL CLASS III-----------no calf tenderness----------SLEEP STUDY SHOWS AHI 0.6 but T 90  < 35 %-----DIAGNOSED  AS PAH ---------WAS ON ADMAPAS  NOW ON   SQ REMODULIN  [ SINCE MAY 2018]  ---S/P PACEMAKER PLACEMENT AT [place]  AND ON METOPROLOL-----   2/3/2022 tested positive on home covid test 2/2/2022- developed shortness of breath, nausea and vomitting- son is positive She is vaccinated for covid but not boosted Afebrile, drinking but not eating well. O2 sat 94% room air rest- 92% room air w/walk  2/7/2022 covid pcr negative - did not get MAB still with SOUZA - on prednisone with taper  4/2022 6mwt o2 sat 95% to 94% on oxygen 69meters (135m) stopped d/t severe SOB and elevated HR  4/15/2022  pulm htn - on remodulin 36 ng (having jaw pain sometimes), sildenafil 10 mg qd, furosemide 20 mg 5x weekly which helped w/edema. She is also having palpitations- has not yet followed up with DR Chavis in cardiology. Asthma-occas wheezing- awaiting start of dupixent Up to date w/covid vac- declines influenza vac. s/p consult with lung transplant team but needs to lower BMI before being considered- pt has been referred to nutritionist but has not scheduled appt   5/31/22- pulm htn - on remodulin 36 ng SQ (having jaw pain sometimes), sildenafil 10 mg qd, and diuretics daily- doing well from pulm perspectives Asthma currently under control- declining biologic therapy Was following lung  transplant team but currently on hold d/t BMI   7/11/22- pulm htn--on remodulin 36 ng SQ (having jaw pain sometimes), sildenafil 10 mg qd and diuretics daily [LASIX WO MG---FAZAL  was in the hospital at [facility]-- given Lasix --- we will slowly increase her dose of Remodulin  7/22/2022 pulm htn- breathing improved on higher dose of remodulin 40ng, having some diarrhea. Palpitations improved  9/15 /2022 ----s/p hospitalization for cardiogenic shock due to RV failure with markers of end organ dysfunction, started on  as well as Marcela. CTA negative for PE but TTE revealed RA thrombus with concern for clot in transit. She underwent catheter directed partial thrombus aspiration 8/31, course complicated by L fem pseudoaneurysm for which she received thrombin injection on 9/3. Course further complicated by S. Lugdunensis bacteremia on BCx from 8/29 s/p drainage of abscess at prior----on IV antibiotics as per ID Remodulin subcutaneous access site.--42 ng/kg/min , also on sildenafil 20 mg 3 times daily ----   SEPT 27 2022-----feels much better on subcu Remodulin 44 ng/kg/min and sildenafil 20 mg 3 times daily on diuretics-on anticoagulation-------- needs to see vascular for left femoral pseudoaneurysm--- H/O---S.Lugdunensis bacteremia on BCx from 8/29 s/p drainage of abscess at prior----on IV antibiotics as per ID --------ALSO HAS Lleft   fem pseudoaneurysm---needs follow up by  vasular ---- --received flu vaccine today------------------------  October 2022: Telehealth Visit via teledoc S/P hospitalization for cardiogenic shock c/b bacteremia on IV antibiotics. Followed by ID (Dr. Grover) for labs, antibiotics now d/c'd (10/6)  getting follow up labs w/ID (results pending from 10/19) Pulm htn -She reports use of Remodulin 44 ng/kg/min, tolerating new dose well  and Sildenafil (20 mg t.i.d PO) No resp complaints, no palpitations, follows cardiology Dr Paramjit Chavis. On diuretics, xarelto, O2 as needed s/p lung transplant eval- was told she was "too well" to be listed and needs to work on getting her BMI down  jan 2022--developed abdominal wall abscess--needs surgical drainage--------subcu Remodulin 44 ng/kg/min and sildenafil 20 mg 3 times daily on diuretics-on anticoagulation--  mario    10 2023--- recent admission to the hospital for abscess on abdominal wall which was drained and received antibiotics and it has healed----------------subcu Remodulin 44 ng/kg/min and sildenafil 20 mg 3 times daily on diuretics-on anticoagulation--I would like to increase Remodulin to 45 ng/kg/min--------- having some URI symptoms we will send a nasal swab continue diuretics----   3/2023---seems stable  pulm htn -remains on remodulin 44ng and sildenafil, diuretics per heart failure Dr Chavis c/o worsening SOUZA and nausea x 1week, chroic diarrhea which she attributes to remodulin, and lower abd pain ///??? pancreatitis Tacchycardia has improved, on continuous oxygen Accompanied by    5/2023 accompanied by  pulm htn -remains on remodulin 44ng and sildenafil, diuretics per heart failure Dr Chavis old remodulin site healing w/minimal drainage (previously treated w/antibiotics)  c/o nausea, lightheadedness, headache  june 2023--------accompanied by  pulm htn -remains on remodulin 46ng and sildenafil, diuretics per heart failure Dr Chavis -feesl btter--- less  nausea,---will increase   Remodulin to to 48 ng/kg/min  8/2023 43-year-old female w/ PMH of pulmonary HTN c/b right heart failure, multiple abdominal wall abscesses, right atrial thrombus s/p thrombectomy, asthma, anemia B WAS  admitted for right heart failure exacerbation in s/o pulmonary HTN. IN AUGUST 2023----  -  Problem: Right heart failure.-----  Plan: - In s/o pulmonary HTN - On remodulin, slidenafil, xarelto at home - Lasix 40mg qid, spironolactone 25 mg qd at home - On 2L O2 at home - - S/p diuresis with 40 mg IV lasix qd, per HF -- TTE (8/14) -> EF 55-60%, RV overload -- RHC (8/16) -> Elevated RV pressure c/w pulmonary HTN, elevated PCWP  [ BELIEVE THIS WEDGE PRESSURE IS ERRONEOUS AS PT HAS PREVIOSU EVDIECNE OF PRECAPILLARY PULMONARY HTN ] - - - - -  -At discharge patient was switched to  PO torsemide 20mg qd, per HF -------, using oxygen.   ------Pulm htn On remodulin 48ng- c/o diarrhea remains on sildenafil 20mg tid   12/2023 accomapnied by  , using oxygen.   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     Problem/Plan - 1: -  Problem: Abdominal wall abscess. -  Plan: Afebrile, VSS. Pt with history of prior abdominal wall abscesses iso Rimodulin pump, now with a new abscess CT A/P showing new 2.5 x 3 cm collection in L anterior abdominal wall s/p I&D in ED Prior wound cx Jan 2023: MRSA staph lugdunensis, sensitive in the past to cefazolin and doxy s/p cefazolin and doxy    4/2024 TTM with pt has menstrual cycle today and unable to come in to office c/o dyspnea- using oxygen at 3 lpm-slight dry cough and reports some wheezing - - - Remodulin SQ at 48ng- site cleaned/dressed by home RN  8/2024 Pulm htn w/Remodulin SQ at 48ng-and sidlenafil - - Currently w/asthma exacerbation- itchy eye, mild congeston [TextBox_100] : 1/2014 [TextBox_108] : 0.9 [TextBox_112] : 35.3

## 2024-08-23 NOTE — HISTORY OF PRESENT ILLNESS
[Never] : never [TextBox_4] : --44  female----------Patient is here for follow up of her pulmonary htn. ------------------INITIALLY  REFD  WITH   ECHO [DONE  OUTSIDE ] ELEVATED PASP  DILATED RV AND RA---------  HAS BEEN TOLD IN THE PAST SHE HAS ??? ASTHMA-----long-standing history of dyspnea on exertion-----------------has baseline tachycardia ----...--s. No history of liver dysfunction , collagen vascular disorder or chronic thromboembolic disease. I would classify her dyspnea as WHO  FUNCTIONAL CLASS III-----------no calf tenderness----------SLEEP STUDY SHOWS AHI 0.6 but T 90  < 35 %-----DIAGNOSED  AS PAH ---------WAS ON ADMAPAS  NOW ON   SQ REMODULIN  [ SINCE MAY 2018]  ---S/P PACEMAKER PLACEMENT AT [place]  AND ON METOPROLOL-----   2/3/2022 tested positive on home covid test 2/2/2022- developed shortness of breath, nausea and vomitting- son is positive She is vaccinated for covid but not boosted Afebrile, drinking but not eating well. O2 sat 94% room air rest- 92% room air w/walk  2/7/2022 covid pcr negative - did not get MAB still with SOUZA - on prednisone with taper  4/2022 6mwt o2 sat 95% to 94% on oxygen 69meters (135m) stopped d/t severe SOB and elevated HR  4/15/2022  pulm htn - on remodulin 36 ng (having jaw pain sometimes), sildenafil 10 mg qd, furosemide 20 mg 5x weekly which helped w/edema. She is also having palpitations- has not yet followed up with DR Chavis in cardiology. Asthma-occas wheezing- awaiting start of dupixent Up to date w/covid vac- declines influenza vac. s/p consult with lung transplant team but needs to lower BMI before being considered- pt has been referred to nutritionist but has not scheduled appt   5/31/22- pulm htn - on remodulin 36 ng SQ (having jaw pain sometimes), sildenafil 10 mg qd, and diuretics daily- doing well from pulm perspectives Asthma currently under control- declining biologic therapy Was following lung  transplant team but currently on hold d/t BMI   7/11/22- pulm htn--on remodulin 36 ng SQ (having jaw pain sometimes), sildenafil 10 mg qd and diuretics daily [LASIX WO MG---FAZAL  was in the hospital at [facility]-- given Lasix --- we will slowly increase her dose of Remodulin  7/22/2022 pulm htn- breathing improved on higher dose of remodulin 40ng, having some diarrhea. Palpitations improved  9/15 /2022 ----s/p hospitalization for cardiogenic shock due to RV failure with markers of end organ dysfunction, started on  as well as Marcela. CTA negative for PE but TTE revealed RA thrombus with concern for clot in transit. She underwent catheter directed partial thrombus aspiration 8/31, course complicated by L fem pseudoaneurysm for which she received thrombin injection on 9/3. Course further complicated by S. Lugdunensis bacteremia on BCx from 8/29 s/p drainage of abscess at prior----on IV antibiotics as per ID Remodulin subcutaneous access site.--42 ng/kg/min , also on sildenafil 20 mg 3 times daily ----   SEPT 27 2022-----feels much better on subcu Remodulin 44 ng/kg/min and sildenafil 20 mg 3 times daily on diuretics-on anticoagulation-------- needs to see vascular for left femoral pseudoaneurysm--- H/O---S.Lugdunensis bacteremia on BCx from 8/29 s/p drainage of abscess at prior----on IV antibiotics as per ID --------ALSO HAS Lleft   fem pseudoaneurysm---needs follow up by  vasular ---- --received flu vaccine today------------------------  October 2022: Telehealth Visit via teledoc S/P hospitalization for cardiogenic shock c/b bacteremia on IV antibiotics. Followed by ID (Dr. Grover) for labs, antibiotics now d/c'd (10/6)  getting follow up labs w/ID (results pending from 10/19) Pulm htn -She reports use of Remodulin 44 ng/kg/min, tolerating new dose well  and Sildenafil (20 mg t.i.d PO) No resp complaints, no palpitations, follows cardiology Dr Paramjit Chavis. On diuretics, xarelto, O2 as needed s/p lung transplant eval- was told she was "too well" to be listed and needs to work on getting her BMI down  jan 2022--developed abdominal wall abscess--needs surgical drainage--------subcu Remodulin 44 ng/kg/min and sildenafil 20 mg 3 times daily on diuretics-on anticoagulation--  mario    10 2023--- recent admission to the hospital for abscess on abdominal wall which was drained and received antibiotics and it has healed----------------subcu Remodulin 44 ng/kg/min and sildenafil 20 mg 3 times daily on diuretics-on anticoagulation--I would like to increase Remodulin to 45 ng/kg/min--------- having some URI symptoms we will send a nasal swab continue diuretics----   3/2023---seems stable  pulm htn -remains on remodulin 44ng and sildenafil, diuretics per heart failure Dr Chavis c/o worsening SOUZA and nausea x 1week, chroic diarrhea which she attributes to remodulin, and lower abd pain ///??? pancreatitis Tacchycardia has improved, on continuous oxygen Accompanied by    5/2023 accompanied by  pulm htn -remains on remodulin 44ng and sildenafil, diuretics per heart failure Dr Chavis old remodulin site healing w/minimal drainage (previously treated w/antibiotics)  c/o nausea, lightheadedness, headache  june 2023--------accompanied by  pulm htn -remains on remodulin 46ng and sildenafil, diuretics per heart failure Dr Chavis -feesl btter--- less  nausea,---will increase   Remodulin to to 48 ng/kg/min  8/2023 43-year-old female w/ PMH of pulmonary HTN c/b right heart failure, multiple abdominal wall abscesses, right atrial thrombus s/p thrombectomy, asthma, anemia B WAS  admitted for right heart failure exacerbation in s/o pulmonary HTN. IN AUGUST 2023----  -  Problem: Right heart failure.-----  Plan: - In s/o pulmonary HTN - On remodulin, slidenafil, xarelto at home - Lasix 40mg qid, spironolactone 25 mg qd at home - On 2L O2 at home - - S/p diuresis with 40 mg IV lasix qd, per HF -- TTE (8/14) -> EF 55-60%, RV overload -- RHC (8/16) -> Elevated RV pressure c/w pulmonary HTN, elevated PCWP  [ BELIEVE THIS WEDGE PRESSURE IS ERRONEOUS AS PT HAS PREVIOSU EVDIECNE OF PRECAPILLARY PULMONARY HTN ] - - - - -  -At discharge patient was switched to  PO torsemide 20mg qd, per HF -------, using oxygen.   ------Pulm htn On remodulin 48ng- c/o diarrhea remains on sildenafil 20mg tid   12/2023 accomapnied by  , using oxygen.   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     Problem/Plan - 1: -  Problem: Abdominal wall abscess. -  Plan: Afebrile, VSS. Pt with history of prior abdominal wall abscesses iso Rimodulin pump, now with a new abscess CT A/P showing new 2.5 x 3 cm collection in L anterior abdominal wall s/p I&D in ED Prior wound cx Jan 2023: MRSA staph lugdunensis, sensitive in the past to cefazolin and doxy s/p cefazolin and doxy    4/2024 TTM with pt has menstrual cycle today and unable to come in to office c/o dyspnea- using oxygen at 3 lpm-slight dry cough and reports some wheezing - - - Remodulin SQ at 48ng- site cleaned/dressed by home RN  8/2024 Pulm htn w/Remodulin SQ at 48ng-and sidlenafil - - Currently w/asthma exacerbation- itchy eye, mild congeston [TextBox_100] : 1/2014 [TextBox_108] : 0.9 [TextBox_112] : 35.3

## 2024-08-23 NOTE — DISCUSSION/SUMMARY
[FreeTextEntry1] : ----Assessment and Plan--------44 year-old lady with PULMONARY ARTERIAL HYPERTENSION S/P PPM FOR TACHYCARDIA AT [place] ON REMODULIN - S/P LEFT BREAST BIOPSY   doing well on remodulin 38 ng ----- ON XARELTO, LASIX AND ALDACTONE -------LOW DOSE PREDNISONE -----is following up with at [facility] for transplant listing-----final decision after the  and psych evaluation  JAN 2023---- ABDOMINAL WALL ABSCESS-----needED surgical drainage-doneE MARCH 2023   --- pain abdomen rule out pancreatitis AUGUST 2023--- admitted with fluid overload was treated with diuretics  1. PULMONARY ART HTN   ---- WHO GROUP I FUNCTIONAL CLASS III ----  [AS PER DR FREEMAN WHO DID PULM ANGIO SHE HAS FEATURES OF GROUP I WITH SOME   EVIDENCE OF DISTAL CLOTS]   She is on Remodulin 48 NG/KG/MIN  -pt declines further uptitration  Continue  Sildenafil 20mg,  Xarelto, ON torsemide  AND ALDACTONE 25 MG - f/u with heart failure team  2. Asthma -. use  ipratropium and levalbuterol and budesonide. Refuses use of DUPIXENT at this time   s/p medrol 20mg IM in office today- to take pred 10mg daily ----Advised patient to reconsider Dupixent  3. High BMI (41.9 kg/m2)   Precluding lung transplant   Referred to nutritionist------ extensive nutritional advice given---   Weight loss program advised- is considering ozempic  4. Oxygen medically necessary given severe Pulm Htn, anemia and h/o nocturnal desaturation which could worsen Pulm HTN. Advised oxygen 2-3 lpm x 24hrs, take pred 10mg  5. She is on Xarelto---will be on lifelong anticoagulation---  6) She has features of ERICA- including sleep disturbance, nocturnal desat, and excessive fatigue- HST was negative for ERICA in 2014 but had desaturation. Will try to repeat HST at next visit  8. Follows /[place] [facility] Team----  ---Also consulted with Dr Lou at [facility]---   As per patient, Dr Lou feels she is not a candidate at this time because of good health   Although Dr. Lou advised needs to lose wt to be considered for transplant listing in future  9.labs to be drawn in office today  10- in office fu visit in next few weeks to uptitrate remodulin in office and observe  Vivian Yoon MD, [facility]

## 2024-08-23 NOTE — DISCUSSION/SUMMARY
[FreeTextEntry1] : ----Assessment and Plan--------44 year-old lady with PULMONARY ARTERIAL HYPERTENSION S/P PPM FOR TACHYCARDIA AT [place] ON REMODULIN - S/P LEFT BREAST BIOPSY   doing well on remodulin 38 ng ----- ON XARELTO, LASIX AND ALDACTONE -------LOW DOSE PREDNISONE -----is following up with at [facility] for transplant listing-----final decision after the  and psych evaluation  JAN 2023---- ABDOMINAL WALL ABSCESS-----needED surgical drainage-doneE MARCH 2023   --- pain abdomen rule out pancreatitis AUGUST 2023--- admitted with fluid overload was treated with diuretics  1. PULMONARY ART HTN   ---- WHO GROUP I FUNCTIONAL CLASS III ----  [AS PER DR FREEMAN WHO DID PULM ANGIO SHE HAS FEATURES OF GROUP I WITH SOME   EVIDENCE OF DISTAL CLOTS]   She is on Remodulin 48 NG/KG/MIN  -pt declines further uptitration  Continue  Sildenafil 20mg,  Xarelto, ON torsemide  AND ALDACTONE 25 MG - f/u with heart failure team  2. Asthma -. use  ipratropium and levalbuterol and budesonide. Refuses use of DUPIXENT at this time   s/p medrol 20mg IM in office today- to take pred 10mg daily ----Advised patient to reconsider Dupixent  3. High BMI (41.9 kg/m2)   Precluding lung transplant   Referred to nutritionist------ extensive nutritional advice given---   Weight loss program advised- is considering ozempic  4. Oxygen medically necessary given severe Pulm Htn, anemia and h/o nocturnal desaturation which could worsen Pulm HTN. Advised oxygen 2-3 lpm x 24hrs, take pred 10mg  5. She is on Xarelto---will be on lifelong anticoagulation---  6) She has features of ERICA- including sleep disturbance, nocturnal desat, and excessive fatigue- HST was negative for ERICA in 2014 but had desaturation. Will try to repeat HST at next visit  8. Follows /[place] [facility] Team----  ---Also consulted with Dr Lou at [facility]---   As per patient, Dr Lou feels she is not a candidate at this time because of good health   Although Dr. Lou advised needs to lose wt to be considered for transplant listing in future  9.labs to be drawn in office today  10- in office fu visit in next few weeks to uptitrate remodulin in office and observe  Vivian Yoon MD, [facility]

## 2024-08-23 NOTE — HISTORY OF PRESENT ILLNESS
[Never] : never [TextBox_4] : --44  female----------Patient is here for follow up of her pulmonary htn. ------------------INITIALLY  REFD  WITH   ECHO [DONE  OUTSIDE ] ELEVATED PASP  DILATED RV AND RA---------  HAS BEEN TOLD IN THE PAST SHE HAS ??? ASTHMA-----long-standing history of dyspnea on exertion-----------------has baseline tachycardia ----...--s. No history of liver dysfunction , collagen vascular disorder or chronic thromboembolic disease. I would classify her dyspnea as WHO  FUNCTIONAL CLASS III-----------no calf tenderness----------SLEEP STUDY SHOWS AHI 0.6 but T 90  < 35 %-----DIAGNOSED  AS PAH ---------WAS ON ADMAPAS  NOW ON   SQ REMODULIN  [ SINCE MAY 2018]  ---S/P PACEMAKER PLACEMENT AT [place]  AND ON METOPROLOL-----   2/3/2022 tested positive on home covid test 2/2/2022- developed shortness of breath, nausea and vomitting- son is positive She is vaccinated for covid but not boosted Afebrile, drinking but not eating well. O2 sat 94% room air rest- 92% room air w/walk  2/7/2022 covid pcr negative - did not get MAB still with SOUZA - on prednisone with taper  4/2022 6mwt o2 sat 95% to 94% on oxygen 69meters (135m) stopped d/t severe SOB and elevated HR  4/15/2022  pulm htn - on remodulin 36 ng (having jaw pain sometimes), sildenafil 10 mg qd, furosemide 20 mg 5x weekly which helped w/edema. She is also having palpitations- has not yet followed up with DR Chavis in cardiology. Asthma-occas wheezing- awaiting start of dupixent Up to date w/covid vac- declines influenza vac. s/p consult with lung transplant team but needs to lower BMI before being considered- pt has been referred to nutritionist but has not scheduled appt   5/31/22- pulm htn - on remodulin 36 ng SQ (having jaw pain sometimes), sildenafil 10 mg qd, and diuretics daily- doing well from pulm perspectives Asthma currently under control- declining biologic therapy Was following lung  transplant team but currently on hold d/t BMI   7/11/22- pulm htn--on remodulin 36 ng SQ (having jaw pain sometimes), sildenafil 10 mg qd and diuretics daily [LASIX WO MG---FAZAL  was in the hospital at [facility]-- given Lasix --- we will slowly increase her dose of Remodulin  7/22/2022 pulm htn- breathing improved on higher dose of remodulin 40ng, having some diarrhea. Palpitations improved  9/15 /2022 ----s/p hospitalization for cardiogenic shock due to RV failure with markers of end organ dysfunction, started on  as well as Marcela. CTA negative for PE but TTE revealed RA thrombus with concern for clot in transit. She underwent catheter directed partial thrombus aspiration 8/31, course complicated by L fem pseudoaneurysm for which she received thrombin injection on 9/3. Course further complicated by S. Lugdunensis bacteremia on BCx from 8/29 s/p drainage of abscess at prior----on IV antibiotics as per ID Remodulin subcutaneous access site.--42 ng/kg/min , also on sildenafil 20 mg 3 times daily ----   SEPT 27 2022-----feels much better on subcu Remodulin 44 ng/kg/min and sildenafil 20 mg 3 times daily on diuretics-on anticoagulation-------- needs to see vascular for left femoral pseudoaneurysm--- H/O---S.Lugdunensis bacteremia on BCx from 8/29 s/p drainage of abscess at prior----on IV antibiotics as per ID --------ALSO HAS Lleft   fem pseudoaneurysm---needs follow up by  vasular ---- --received flu vaccine today------------------------  October 2022: Telehealth Visit via teledoc S/P hospitalization for cardiogenic shock c/b bacteremia on IV antibiotics. Followed by ID (Dr. Grover) for labs, antibiotics now d/c'd (10/6)  getting follow up labs w/ID (results pending from 10/19) Pulm htn -She reports use of Remodulin 44 ng/kg/min, tolerating new dose well  and Sildenafil (20 mg t.i.d PO) No resp complaints, no palpitations, follows cardiology Dr Paramjit Chavis. On diuretics, xarelto, O2 as needed s/p lung transplant eval- was told she was "too well" to be listed and needs to work on getting her BMI down  jan 2022--developed abdominal wall abscess--needs surgical drainage--------subcu Remodulin 44 ng/kg/min and sildenafil 20 mg 3 times daily on diuretics-on anticoagulation--  mario    10 2023--- recent admission to the hospital for abscess on abdominal wall which was drained and received antibiotics and it has healed----------------subcu Remodulin 44 ng/kg/min and sildenafil 20 mg 3 times daily on diuretics-on anticoagulation--I would like to increase Remodulin to 45 ng/kg/min--------- having some URI symptoms we will send a nasal swab continue diuretics----   3/2023---seems stable  pulm htn -remains on remodulin 44ng and sildenafil, diuretics per heart failure Dr Chavis c/o worsening SOUZA and nausea x 1week, chroic diarrhea which she attributes to remodulin, and lower abd pain ///??? pancreatitis Tacchycardia has improved, on continuous oxygen Accompanied by    5/2023 accompanied by  pulm htn -remains on remodulin 44ng and sildenafil, diuretics per heart failure Dr Chavis old remodulin site healing w/minimal drainage (previously treated w/antibiotics)  c/o nausea, lightheadedness, headache  june 2023--------accompanied by  pulm htn -remains on remodulin 46ng and sildenafil, diuretics per heart failure Dr Chavis -feesl btter--- less  nausea,---will increase   Remodulin to to 48 ng/kg/min  8/2023 43-year-old female w/ PMH of pulmonary HTN c/b right heart failure, multiple abdominal wall abscesses, right atrial thrombus s/p thrombectomy, asthma, anemia B WAS  admitted for right heart failure exacerbation in s/o pulmonary HTN. IN AUGUST 2023----  -  Problem: Right heart failure.-----  Plan: - In s/o pulmonary HTN - On remodulin, slidenafil, xarelto at home - Lasix 40mg qid, spironolactone 25 mg qd at home - On 2L O2 at home - - S/p diuresis with 40 mg IV lasix qd, per HF -- TTE (8/14) -> EF 55-60%, RV overload -- RHC (8/16) -> Elevated RV pressure c/w pulmonary HTN, elevated PCWP  [ BELIEVE THIS WEDGE PRESSURE IS ERRONEOUS AS PT HAS PREVIOSU EVDIECNE OF PRECAPILLARY PULMONARY HTN ] - - - - -  -At discharge patient was switched to  PO torsemide 20mg qd, per HF -------, using oxygen.   ------Pulm htn On remodulin 48ng- c/o diarrhea remains on sildenafil 20mg tid   12/2023 accomapnied by  , using oxygen.   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     Problem/Plan - 1: -  Problem: Abdominal wall abscess. -  Plan: Afebrile, VSS. Pt with history of prior abdominal wall abscesses iso Rimodulin pump, now with a new abscess CT A/P showing new 2.5 x 3 cm collection in L anterior abdominal wall s/p I&D in ED Prior wound cx Jan 2023: MRSA staph lugdunensis, sensitive in the past to cefazolin and doxy s/p cefazolin and doxy    4/2024 TTM with pt has menstrual cycle today and unable to come in to office c/o dyspnea- using oxygen at 3 lpm-slight dry cough and reports some wheezing - - - Remodulin SQ at 48ng- site cleaned/dressed by home RN  8/2024 Pulm htn w/Remodulin SQ at 48ng-and sidlenafil - - Currently w/asthma exacerbation- itchy eye, mild congeston [TextBox_100] : 1/2014 [TextBox_108] : 0.9 [TextBox_112] : 35.3

## 2024-08-23 NOTE — REVIEW OF SYSTEMS
[Fatigue] : fatigue [Recent Wt Gain (___ Lbs)] : ~T recent [unfilled] lb weight gain [Poor Appetite] : poor appetite [Dyspnea] : dyspnea [SOB on Exertion] : sob on exertion [Orthopnea] : orthopnea [Palpitations] : palpitations [GERD] : gerd [Anemia] : anemia [Fever] : no fever [Chills] : no chills [Dry Eyes] : no dry eyes [Cough] : no cough [Hemoptysis] : no hemoptysis [Sputum] : no sputum [Chest Discomfort] : no chest discomfort [Nasal Discharge] : no nasal discharge [Abdominal Pain] : no abdominal pain [Nausea] : no nausea [Vomiting] : no vomiting [Nocturia] : no nocturia [Arthralgias] : no arthralgias [Myalgias] : no myalgias [Raynaud] : no raynaud [Headache] : no headache [Focal Weakness] : no focal weakness [Numbness] : no numbness [Anxiety] : no anxiety [Depression] : no depression [Diabetes] : no diabetes

## 2024-08-24 LAB
ALBUMIN SERPL ELPH-MCNC: 4.4 G/DL
ALP BLD-CCNC: 65 U/L
ALT SERPL-CCNC: 10 U/L
ANION GAP SERPL CALC-SCNC: 15 MMOL/L
AST SERPL-CCNC: 14 U/L
BASOPHILS # BLD AUTO: 0.06 K/UL
BASOPHILS NFR BLD AUTO: 0.5 %
BILIRUB SERPL-MCNC: 0.8 MG/DL
BUN SERPL-MCNC: 9 MG/DL
CALCIUM SERPL-MCNC: 9.2 MG/DL
CHLORIDE SERPL-SCNC: 106 MMOL/L
CO2 SERPL-SCNC: 16 MMOL/L
CREAT SERPL-MCNC: 1.04 MG/DL
EGFR: 68 ML/MIN/1.73M2
EOSINOPHIL # BLD AUTO: 0.25 K/UL
EOSINOPHIL # BLD MANUAL: 270 /UL
EOSINOPHIL NFR BLD AUTO: 2.3 %
FSH SERPL-MCNC: 5 IU/L
GLUCOSE SERPL-MCNC: 82 MG/DL
HCT VFR BLD CALC: 45 %
HGB BLD-MCNC: 14 G/DL
IMM GRANULOCYTES NFR BLD AUTO: 0.4 %
LH SERPL-ACNC: 4 IU/L
LYMPHOCYTES # BLD AUTO: 2.14 K/UL
LYMPHOCYTES NFR BLD AUTO: 19.4 %
MAN DIFF?: NORMAL
MCHC RBC-ENTMCNC: 21 PG
MCHC RBC-ENTMCNC: 31.1 GM/DL
MCV RBC AUTO: 67.6 FL
MONOCYTES # BLD AUTO: 0.82 K/UL
MONOCYTES NFR BLD AUTO: 7.4 %
NEUTROPHILS # BLD AUTO: 7.72 K/UL
NEUTROPHILS NFR BLD AUTO: 70 %
NT-PROBNP SERPL-MCNC: 482 PG/ML
PLATELET # BLD AUTO: 405 K/UL
POTASSIUM SERPL-SCNC: 4.4 MMOL/L
PROT SERPL-MCNC: 7.4 G/DL
RBC # BLD: 6.66 M/UL
RBC # FLD: 22.1 %
SODIUM SERPL-SCNC: 138 MMOL/L
TSH SERPL-ACNC: 2.43 UIU/ML
WBC # FLD AUTO: 11.03 K/UL

## 2024-08-26 ENCOUNTER — APPOINTMENT (OUTPATIENT)
Dept: INTERNAL MEDICINE | Facility: CLINIC | Age: 44
End: 2024-08-26

## 2024-08-26 LAB
A ALTERNATA IGE QN: <0.1 KUA/L
A FUMIGATUS IGE QN: <0.1 KUA/L
BERMUDA GRASS IGE QN: <0.1 KUA/L
BOXELDER IGE QN: 0.14 KUA/L
C HERBARUM IGE QN: 0.11 KUA/L
CALIF WALNUT IGE QN: <0.1 KUA/L
CAT DANDER IGE QN: <0.1 KUA/L
CMN PIGWEED IGE QN: <0.1 KUA/L
COMMON RAGWEED IGE QN: <0.1 KUA/L
COTTONWOOD IGE QN: 0.15 KUA/L
D FARINAE IGE QN: 0.16 KUA/L
D PTERONYSS IGE QN: 0.16 KUA/L
DEPRECATED A ALTERNATA IGE RAST QL: 0 (ref 0–?)
DEPRECATED A FUMIGATUS IGE RAST QL: 0 (ref 0–?)
DEPRECATED BERMUDA GRASS IGE RAST QL: 0 (ref 0–?)
DEPRECATED BOXELDER IGE RAST QL: NORMAL (ref 0–?)
DEPRECATED C HERBARUM IGE RAST QL: NORMAL (ref 0–?)
DEPRECATED CAT DANDER IGE RAST QL: 0 (ref 0–?)
DEPRECATED COMMON PIGWEED IGE RAST QL: 0 (ref 0–?)
DEPRECATED COMMON RAGWEED IGE RAST QL: 0 (ref 0–?)
DEPRECATED COTTONWOOD IGE RAST QL: NORMAL (ref 0–?)
DEPRECATED D FARINAE IGE RAST QL: NORMAL (ref 0–?)
DEPRECATED D PTERONYSS IGE RAST QL: NORMAL (ref 0–?)
DEPRECATED DOG DANDER IGE RAST QL: 0 (ref 0–?)
DEPRECATED GOOSEFOOT IGE RAST QL: NORMAL (ref 0–?)
DEPRECATED KAPPA LC FREE/LAMBDA SER: 2.11 RATIO
DEPRECATED LONDON PLANE IGE RAST QL: 0 (ref 0–?)
DEPRECATED MOUSE URINE PROT IGE RAST QL: 0 (ref 0–?)
DEPRECATED MUGWORT IGE RAST QL: 0 (ref 0–?)
DEPRECATED P NOTATUM IGE RAST QL: 0 (ref 0–?)
DEPRECATED RED CEDAR IGE RAST QL: NORMAL (ref 0–?)
DEPRECATED ROACH IGE RAST QL: NORMAL (ref 0–?)
DEPRECATED SHEEP SORREL IGE RAST QL: 0 (ref 0–?)
DEPRECATED SILVER BIRCH IGE RAST QL: 0 (ref 0–?)
DEPRECATED TIMOTHY IGE RAST QL: 0 (ref 0–?)
DEPRECATED WHITE ASH IGE RAST QL: NORMAL (ref 0–?)
DEPRECATED WHITE OAK IGE RAST QL: 0 (ref 0–?)
DOG DANDER IGE QN: <0.1 KUA/L
GOOSEFOOT IGE QN: 0.2 KUA/L
IGA SER QL IEP: 145 MG/DL
IGG SER QL IEP: 1284 MG/DL
IGM SER QL IEP: 127 MG/DL
KAPPA LC CSF-MCNC: 0.81 MG/DL
KAPPA LC SERPL-MCNC: 1.71 MG/DL
LONDON PLANE IGE QN: <0.1 KUA/L
MOUSE URINE PROT IGE QN: <0.1 KUA/L
MUGWORT IGE QN: <0.1 KUA/L
MULBERRY (T70) CLASS: 0 (ref 0–?)
MULBERRY (T70) CONC: <0.1 KUA/L
P NOTATUM IGE QN: <0.1 KUA/L
RED CEDAR IGE QN: 0.15 KUA/L
ROACH IGE QN: 0.14 KUA/L
SHEEP SORREL IGE QN: <0.1 KUA/L
SILVER BIRCH IGE QN: <0.1 KUA/L
TIMOTHY IGE QN: <0.1 KUA/L
TOTAL IGE SMQN RAST: 2735 KU/L
TREE ALLERG MIX1 IGE QL: 0 (ref 0–?)
WHITE ASH IGE QN: 0.11 KUA/L
WHITE ELM IGE QN: 0.1 KUA/L
WHITE ELM IGE QN: NORMAL (ref 0–?)
WHITE OAK IGE QN: <0.1 KUA/L

## 2024-08-26 RX ORDER — METHYLPREDNISOLONE 40 MG/ML
40 INJECTION, POWDER, LYOPHILIZED, FOR SOLUTION INTRAMUSCULAR; INTRAVENOUS
Refills: 0 | Status: COMPLETED | OUTPATIENT
Start: 2024-08-26

## 2024-08-26 RX ORDER — MONTELUKAST 10 MG/1
10 TABLET, FILM COATED ORAL
Qty: 1 | Refills: 5 | Status: ACTIVE | COMMUNITY
Start: 2024-08-26 | End: 1900-01-01

## 2024-08-28 NOTE — ASSESSMENT
[FreeTextEntry1] : 44 y.o. female with PMH of anxiety, bradycardia s/p dual chamber pacemaker, right heart failure, asthma, pulmonary HTN, RA thrombus s/p thrombectomy, PE on Xarelto, multiple abdominal wall abscesses s/p I&D, previously evaluated for lung transplant 10/2021. She was advised to lose weight as BMI at the time was 41.9, with goal of 170-180 lbs, pulm rehab referral was provided, and recommended sleep study. Currently on Remodulin and sildenafil and follows with Dr. Yoon for management of pulmonary HTN.     #Lung transplant evaluation -    FOLLOW UPS: -    RTC *******   Patient asked relevant questions which were answered. Patient education provided. Teach back performed.  Above discussed with Dr. Wise

## 2024-08-28 NOTE — HISTORY OF PRESENT ILLNESS
[Never] : never [TextBox_4] : PMH: 44 y.o. female with PMH of anxiety, bradycardia s/p dual chamber pacemaker, right heart failure, asthma, pulmonary HTN, RA thrombus s/p thrombectomy, PE on Xarelto, multiple abdominal wall abscesses s/p I&D, previously evaluated for lung transplant 10/2021. She was advised to lose weight as BMI at the time was 41.9, with goal of 170-180 lbs, pulm rehab referral was provided, and recommended sleep study. Currently on Remodulin and sildenafil and follows with Dr. Yoon for management of pulmonary HTN.    When/how diagnosed with primary pulm dx?     MEDICATIONS Atrovent HFA 17 MCG/ACT INHALE 1 OR 2 PUFFS 4 TIMES DAILY PRN Azelastine HCl - 0.15 % Nasal Solution; 2 sprays each nostril BID Budesonide 0.5 MG/2ML Inhalation Suspension; USE 1 UNIT DOSE VIA NEBULIZER BID Dupixent 300 MG/2ML Subcutaneous Solution Pen-injector; 600mg loading dose then 300mg every 2 weeks Epinephrine 0.3 MG/0.3ML Injection Solution Auto-injector; INJECT 0.3ML INTRAMUSCULARLY AS DIRECTED Fluticasone Propionate 50 MCG/ACT USE 1 TO 2 SPRAYS IN EACH NOSTRIL DAILY Furosemide 20 MG Oral Tablet; TAKE 1 TABLET BY MOUTH 5 TIMES PER WEEK Hydroxyzine HCl - 25 MG Oral Tablet Ipratropium Bromide 0.02 % Inhalation Solution; USE 1 UNIT DOSE IN NEBULIZER 4 TIMES DAILY Levalbuterol HCl - 0.63 MG/3ML Inhalation Nebulization Solution; USE 1 UNIT DOSE EVERY 4-6 HOURS PRN Miconazole Antifungal 2 % External Cream; APPLY SPARINGLY TO AFFECTED AREA(S) TWICE DAILY Montelukast Sodium 10 MG Oral Tablet; take 1 tablet at bedtime Mucinex 600 MG Oral Tablet Extended Release 12 Hour; TAKE 1 TABLET EVERY 12 HOURS AS NEEDED FOR CONGESTION Omeprazole 40 MG daily Ondansetron 4 MG Oral Tablet Disintegrating Q12h PRN Potassium Chloride ER 10 MEQ Oral Tablet Extended Release; TAKE 1 TABLET DAILY PredniSONE 10 MG Oral Tablet; TAKE 1 TABLET DAILY PredniSONE 10 MG Oral Tablet; TAKE 2 TABLETS DAILY x 5 days, 1 1/2 tabs daily x 5days, 1 tab daily x 5 days, 1/2 tab daily x 5 days predniSONE 2.5 MG Oral Tablet; Take 1 tablet by mouth with 5.mg tablet daily PredniSONE 5 MG Oral Tablet; Take 1 tablet daily Remodulin 20 MG/20ML Injection Solution; concentration 2.5mg per ml rate 0.036 ml per hr 3ml/syringe for 3 days 23ng per kg per min (dose wt92kg) via SQ infusion- give 2.5mg/ml vial Sildenafil Citrate 20 MG Oral Tablet; TAKE 0.5 TABLET Daily Spironolactone 25 MG Oral Tablet; Take 1 tablet daily Vitamin B-12 1000 MCG Sublingual Tablet Sublingual; DISSOLVE 1000 MCG Daily Vitamin D (Ergocalciferol) 1.25 MG (49439 UT) Oral Capsule; TAKE 1 CAPSULE BY MOUTH ONCE A WEEK Xarelto 10 MG Oral Tablet; Take 1 tablet daily    PSH:    Allergies:   Oxygen requirement *** at rest *** on exertion *** at night   Quality of life:    Functional status: [] performs activities of daily living with NO assistance [] performs activities of daily living with SOME assistance [] performs activities of daily living with TOTAL assistance   Exposures:   Prior hospitalizations, ICU admission or intubations:    Hx covid infection, blood transfusions or pregnancies:   Health maintenance/vaccines: COVID vax:   Family History:  Social History: Lives with: ETOH/tobacco use:    DATA REVIEWED:  PFTs  FVC: FEV1: TLC: DLCO:     6MWT: 66 meters    CT CHEST     ECHO 7/16/2024 CONCLUSIONS:  1. LV cavity is small. LV wall thickness is moderately increased. The interventricular septum is flattened in systole and diastole consistent with RV pressure and volume overload. LV systolic function is normal.  2. Mildly enlarged RV cavity size, with normal wall thickness, and normal RV systolic function. Tricuspid annular plane systolic excursion (TAPSE) is 2.1 cm (normal >=1.7 cm).  3. Right ventricular free wall strain is --17 %.  4. Compared to the transthoracic echocardiogram performed on 8/14/2023, overall findings are similar. PASP not estimated on prior exam.  5. Estimated pulmonary artery systolic pressure is 64 mmHg, consistent with severe pulmonary hypertension.   RHC/LHC

## 2024-09-03 ENCOUNTER — APPOINTMENT (OUTPATIENT)
Dept: PULMONOLOGY | Facility: CLINIC | Age: 44
End: 2024-09-03
Payer: MEDICAID

## 2024-09-03 VITALS
WEIGHT: 219 LBS | BODY MASS INDEX: 36.49 KG/M2 | RESPIRATION RATE: 17 BRPM | TEMPERATURE: 98.2 F | HEART RATE: 117 BPM | HEIGHT: 65 IN | SYSTOLIC BLOOD PRESSURE: 118 MMHG | OXYGEN SATURATION: 91 % | DIASTOLIC BLOOD PRESSURE: 87 MMHG

## 2024-09-03 DIAGNOSIS — J45.909 UNSPECIFIED ASTHMA, UNCOMPLICATED: ICD-10-CM

## 2024-09-03 PROCEDURE — 99214 OFFICE O/P EST MOD 30 MIN: CPT

## 2024-09-03 NOTE — REVIEW OF SYSTEMS
ESOPHAGOGASTRODUODENOSCOPY PROCEDURE NOTE     Patient's Name: Bienvenido Lester  Medical Record Number: [id number]  YOB: 1969    Date of Procedure: 6/25/2019     Primary Care Provider: Omar Hobbs MD  Referring Provider: Dr. Stout    Endoscopist: Sabino Oneil MD  Assisting Endoscopist: Dr. Poncho Styles    Procedure performed: EGD (esophagogastroduodenoscopy) with hemoclip placement, epinephrine injection, and Shelli Ink Tattoo.    Instrument Used: Olympus video endoscope.     Extent of Examination: Second portion of the duodenum.     Limitation of Examination: None.    Preoperative Diagnosis:  1. Submucosal mass  2. Melena    Postoperative Diagnosis:  1. Large submucosal mass previously described in the posterior wall with central ulceration treated with hemoclip and 2cc 1:36021 epinephrine injection. There was 3cc Shelli Ink injected    Medications: Please see anesthesiologist's notes for details.     Informed Consent:  After explaining to the patient the risks, benefits and alternative the EGD including but not limited to the risk of sedation, bleeding or perforation, they were then allowed to ask questions.  After answering all of their questions, they elected to proceed and informed consent was obtained and placed in the chart.     Procedure Details:  The patient was placed in the left lateral decubitus position and the appropriate sedation was given.  The bite block was in place and the endoscope was slowly inserted into the oropharynx down into the stomach and all the way to the second portion of the duodenum without difficulty.  Careful examination was given to the upper intestinal mucosa on insertion and withdrawal.  Upper intestinal landmarks were identified and photographed including the second portion of the duodenum, duodenal bulb, pyloric channel, antrum, incisura, cardiac portion of the stomach and Z-line of the esophagus. Z-line noted at 44cm. This exam revealed large submucosal  mass previously described in the posterior wall with central ulceration treated with hemoclip and 2cc 1:13858 epinephrine injection. There was 3cc Shelli Ink injected.  The endoscope was then slowly withdrawn and the procedure was terminated without complications.  The patient tolerated the procedure well.     Impression:  EGD for melena and submucosal mass. Patient with previously described submucosal mass treated with hemoclip and epinphrine injection. The area near the mass was injected with Shelli Ink    Plan:  -Regular diet  -Surgery planned for 7/2/2019  -Monitor for additional signs and symptoms of bleeding  -Continue PPI    Specimens Obtained: None    Complications: None    Disposition:  The patient will be allowed to recover in the recovery room as per protocol.     Dr. Oneil was present for the procedure.    Poncho Styles MD  Gastroenterology Fellow  6/25/2019  [phone]    Attending addendum -   I was scrubbed in and present for the entire duration of the procedure and have personally provided oversight/supervision of the Fellow during the key components of the above procedure as appropriate and agree with the Fellow's dictation.    Sabino Oneil MD   [Fatigue] : fatigue [Recent Wt Gain (___ Lbs)] : ~T recent [unfilled] lb weight gain [Poor Appetite] : poor appetite [Dyspnea] : dyspnea [SOB on Exertion] : sob on exertion [Orthopnea] : orthopnea [Palpitations] : palpitations [GERD] : gerd [Anemia] : anemia [Fever] : no fever [Chills] : no chills [Dry Eyes] : no dry eyes [Cough] : no cough [Hemoptysis] : no hemoptysis [Sputum] : no sputum [Chest Discomfort] : no chest discomfort [Nasal Discharge] : no nasal discharge [Abdominal Pain] : no abdominal pain [Nausea] : no nausea [Vomiting] : no vomiting [Nocturia] : no nocturia [Arthralgias] : no arthralgias [Myalgias] : no myalgias [Raynaud] : no raynaud [Headache] : no headache [Focal Weakness] : no focal weakness [Numbness] : no numbness [Depression] : no depression [Anxiety] : no anxiety [Diabetes] : no diabetes

## 2024-09-03 NOTE — HISTORY OF PRESENT ILLNESS
[Never] : never [TextBox_4] : --44  female----------Patient is here for follow up of her pulmonary htn. ------------------INITIALLY  REFD  WITH   ECHO [DONE  OUTSIDE ] ELEVATED PASP  DILATED RV AND RA---------  HAS BEEN TOLD IN THE PAST SHE HAS ??? ASTHMA-----long-standing history of dyspnea on exertion-----------------has baseline tachycardia ----...--s. No history of liver dysfunction , collagen vascular disorder or chronic thromboembolic disease. I would classify her dyspnea as WHO  FUNCTIONAL CLASS III-----------no calf tenderness----------SLEEP STUDY SHOWS AHI 0.6 but T 90  < 35 %-----DIAGNOSED  AS PAH ---------WAS ON ADMAPAS  NOW ON   SQ REMODULIN  [ SINCE MAY 2018]  ---S/P PACEMAKER PLACEMENT AT [place]  AND ON METOPROLOL-----   2/3/2022 tested positive on home covid test 2/2/2022- developed shortness of breath, nausea and vomitting- son is positive She is vaccinated for covid but not boosted Afebrile, drinking but not eating well. O2 sat 94% room air rest- 92% room air w/walk  2/7/2022 covid pcr negative - did not get MAB still with SOUZA - on prednisone with taper  4/2022 6mwt o2 sat 95% to 94% on oxygen 69meters (135m) stopped d/t severe SOB and elevated HR  4/15/2022  pulm htn - on remodulin 36 ng (having jaw pain sometimes), sildenafil 10 mg qd, furosemide 20 mg 5x weekly which helped w/edema. She is also having palpitations- has not yet followed up with DR Chavis in cardiology. Asthma-occas wheezing- awaiting start of dupixent Up to date w/covid vac- declines influenza vac. s/p consult with lung transplant team but needs to lower BMI before being considered- pt has been referred to nutritionist but has not scheduled appt   5/31/22- pulm htn - on remodulin 36 ng SQ (having jaw pain sometimes), sildenafil 10 mg qd, and diuretics daily- doing well from pulm perspectives Asthma currently under control- declining biologic therapy Was following lung  transplant team but currently on hold d/t BMI   7/11/22- pulm htn--on remodulin 36 ng SQ (having jaw pain sometimes), sildenafil 10 mg qd and diuretics daily [LASIX WO MG---FAZAL  was in the hospital at [facility]-- given Lasix --- we will slowly increase her dose of Remodulin  7/22/2022 pulm htn- breathing improved on higher dose of remodulin 40ng, having some diarrhea. Palpitations improved  9/15 /2022 ----s/p hospitalization for cardiogenic shock due to RV failure with markers of end organ dysfunction, started on  as well as Marcela. CTA negative for PE but TTE revealed RA thrombus with concern for clot in transit. She underwent catheter directed partial thrombus aspiration 8/31, course complicated by L fem pseudoaneurysm for which she received thrombin injection on 9/3. Course further complicated by S. Lugdunensis bacteremia on BCx from 8/29 s/p drainage of abscess at prior----on IV antibiotics as per ID Remodulin subcutaneous access site.--42 ng/kg/min , also on sildenafil 20 mg 3 times daily ----   SEPT 27 2022-----feels much better on subcu Remodulin 44 ng/kg/min and sildenafil 20 mg 3 times daily on diuretics-on anticoagulation-------- needs to see vascular for left femoral pseudoaneurysm--- H/O---S.Lugdunensis bacteremia on BCx from 8/29 s/p drainage of abscess at prior----on IV antibiotics as per ID --------ALSO HAS Lleft   fem pseudoaneurysm---needs follow up by  vasular ---- --received flu vaccine today------------------------  October 2022: Telehealth Visit via teledoc S/P hospitalization for cardiogenic shock c/b bacteremia on IV antibiotics. Followed by ID (Dr. Grover) for labs, antibiotics now d/c'd (10/6)  getting follow up labs w/ID (results pending from 10/19) Pulm htn -She reports use of Remodulin 44 ng/kg/min, tolerating new dose well  and Sildenafil (20 mg t.i.d PO) No resp complaints, no palpitations, follows cardiology Dr Paramjit Chavis. On diuretics, xarelto, O2 as needed s/p lung transplant eval- was told she was "too well" to be listed and needs to work on getting her BMI down  jan 2022--developed abdominal wall abscess--needs surgical drainage--------subcu Remodulin 44 ng/kg/min and sildenafil 20 mg 3 times daily on diuretics-on anticoagulation--  mario    10 2023--- recent admission to the hospital for abscess on abdominal wall which was drained and received antibiotics and it has healed----------------subcu Remodulin 44 ng/kg/min and sildenafil 20 mg 3 times daily on diuretics-on anticoagulation--I would like to increase Remodulin to 45 ng/kg/min--------- having some URI symptoms we will send a nasal swab continue diuretics----   3/2023---seems stable  pulm htn -remains on remodulin 44ng and sildenafil, diuretics per heart failure Dr Chavis c/o worsening SOUZA and nausea x 1week, chroic diarrhea which she attributes to remodulin, and lower abd pain ///??? pancreatitis Tacchycardia has improved, on continuous oxygen Accompanied by    5/2023 accompanied by  pulm htn -remains on remodulin 44ng and sildenafil, diuretics per heart failure Dr Chavis old remodulin site healing w/minimal drainage (previously treated w/antibiotics)  c/o nausea, lightheadedness, headache  june 2023--------accompanied by  pulm htn -remains on remodulin 46ng and sildenafil, diuretics per heart failure Dr Chavis -feesl btter--- less  nausea,---will increase   Remodulin to to 48 ng/kg/min  8/2023 43-year-old female w/ PMH of pulmonary HTN c/b right heart failure, multiple abdominal wall abscesses, right atrial thrombus s/p thrombectomy, asthma, anemia B WAS  admitted for right heart failure exacerbation in s/o pulmonary HTN. IN AUGUST 2023----  -  Problem: Right heart failure.-----  Plan: - In s/o pulmonary HTN - On remodulin, slidenafil, xarelto at home - Lasix 40mg qid, spironolactone 25 mg qd at home - On 2L O2 at home - - S/p diuresis with 40 mg IV lasix qd, per HF -- TTE (8/14) -> EF 55-60%, RV overload -- RHC (8/16) -> Elevated RV pressure c/w pulmonary HTN, elevated PCWP  [ BELIEVE THIS WEDGE PRESSURE IS ERRONEOUS AS PT HAS PREVIOSU EVDIECNE OF PRECAPILLARY PULMONARY HTN ] - - - - -  -At discharge patient was switched to  PO torsemide 20mg qd, per HF -------, using oxygen.   ------Pulm htn On remodulin 48ng- c/o diarrhea remains on sildenafil 20mg tid   12/2023 accomapnied by  , using oxygen.   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     Problem/Plan - 1: -  Problem: Abdominal wall abscess. -  Plan: Afebrile, VSS. Pt with history of prior abdominal wall abscesses iso Rimodulin pump, now with a new abscess CT A/P showing new 2.5 x 3 cm collection in L anterior abdominal wall s/p I&D in ED Prior wound cx Jan 2023: MRSA staph lugdunensis, sensitive in the past to cefazolin and doxy s/p cefazolin and doxy    4/2024 TTM with pt has menstrual cycle today and unable to come in to office c/o dyspnea- using oxygen at 3 lpm-slight dry cough and reports some wheezing - - - Remodulin SQ at 48ng- site cleaned/dressed by home RN  8/2024 Pulm htn w/Remodulin SQ at 48ng-and sildenafil - - Currently w/asthma exacerbation- itchy eye, mild congestion  9/2024 Pulm htn w/Remodulin SQ at 48ng-and sildenafil - - here to increase remodulin to 49ng w/observation as she is concerned for potential side effects from higher dose  Asthma has improved since last visit- off prednisone- taking singulair IGE>2000 - pt still relucatnt to start biologic therapy NEEDS PFT - 6 MWT [TextBox_100] : 1/2014 [TextBox_108] : 0.9 [TextBox_112] : 35.3

## 2024-09-03 NOTE — DISCUSSION/SUMMARY
[FreeTextEntry1] : ----Assessment and Plan--------44 year-old lady with PULMONARY ARTERIAL HYPERTENSION S/P PPM FOR TACHYCARDIA AT [place] ON REMODULIN - S/P LEFT BREAST BIOPSY   doing well on remodulin 38 ng ----- ON XARELTO, LASIX AND ALDACTONE -------LOW DOSE PREDNISONE -----is following up with at [facility] for transplant listing-----final decision after the  and psych evaluation  JAN 2023---- ABDOMINAL WALL ABSCESS-----needED surgical drainage-doneE MARCH 2023   --- pain abdomen rule out pancreatitis AUGUST 2023--- admitted with fluid overload was treated with diuretics  1. PULMONARY ART HTN   ---- WHO GROUP I FUNCTIONAL CLASS III ----  [AS PER DR FREEMAN WHO DID PULM ANGIO SHE HAS FEATURES OF GROUP I WITH SOME   EVIDENCE OF DISTAL CLOTS]   She is on Remodulin 48 NG/KG/MIN  -uptitrated to 49ng (0.054 ml) in office and observed x 1 hr- tolerated well. Site c/d/i but pt w/multiple sore areas of abd from previous injection site- advised to rotate sites to arms/legs   Continue  Sildenafil 20mg,  Xarelto, ON torsemide  AND ALDACTONE 25 MG - f/u with heart failure team ---NEEDS PFT  6MWT - - - 2. Asthma -. use  ipratropium and levalbuterol and budesonide. Refuses use of DUPIXENT at this time   advised to continue singulair --Advised patient to reconsider Dupixent-discussed benefits of biologic therapy  3. High BMI (41.9 kg/m2)   Precluding lung transplant   Referred to nutritionist------ extensive nutritional advice given---   Weight loss program advised- is considering ozempic  4. Oxygen medically necessary given severe Pulm Htn, anemia and h/o nocturnal desaturation which could worsen Pulm HTN. Advised oxygen 2-3 lpm x 24hrs, take pred 10mg  5. She is on Xarelto---will be on lifelong anticoagulation---  6) She has features of ERICA- including sleep disturbance, nocturnal desat, and excessive fatigue- HST was negative for ERICA in 2014 but had desaturation. Will try to repeat HST at next visit  8. Follows w/[facility]----  ---Also consulted with Dr Lou at [facility]---   As per patient, Dr Lou feels she is not a candidate at this time because of good health   Although Dr. Lou advised needs to lose wt to be considered for transplant listing in future  9.f/u in 8 weeks   Vivian Yoon MD, [facility]   [id number] Detailed

## 2024-09-03 NOTE — DISCUSSION/SUMMARY
[FreeTextEntry1] : ----Assessment and Plan--------44 year-old lady with PULMONARY ARTERIAL HYPERTENSION S/P PPM FOR TACHYCARDIA AT [place] ON REMODULIN - S/P LEFT BREAST BIOPSY   doing well on remodulin 38 ng ----- ON XARELTO, LASIX AND ALDACTONE -------LOW DOSE PREDNISONE -----is following up with at [facility] for transplant listing-----final decision after the  and psych evaluation  JAN 2023---- ABDOMINAL WALL ABSCESS-----needED surgical drainage-doneE MARCH 2023   --- pain abdomen rule out pancreatitis AUGUST 2023--- admitted with fluid overload was treated with diuretics  1. PULMONARY ART HTN   ---- WHO GROUP I FUNCTIONAL CLASS III ----  [AS PER DR FREEMAN WHO DID PULM ANGIO SHE HAS FEATURES OF GROUP I WITH SOME   EVIDENCE OF DISTAL CLOTS]   She is on Remodulin 48 NG/KG/MIN  -uptitrated to 49ng (0.054 ml) in office and observed x 1 hr- tolerated well. Site c/d/i but pt w/multiple sore areas of abd from previous injection site- advised to rotate sites to arms/legs   Continue  Sildenafil 20mg,  Xarelto, ON torsemide  AND ALDACTONE 25 MG - f/u with heart failure team ---NEEDS PFT  6MWT - - - 2. Asthma -. use  ipratropium and levalbuterol and budesonide. Refuses use of DUPIXENT at this time   advised to continue singulair --Advised patient to reconsider Dupixent-discussed benefits of biologic therapy  3. High BMI (41.9 kg/m2)   Precluding lung transplant   Referred to nutritionist------ extensive nutritional advice given---   Weight loss program advised- is considering ozempic  4. Oxygen medically necessary given severe Pulm Htn, anemia and h/o nocturnal desaturation which could worsen Pulm HTN. Advised oxygen 2-3 lpm x 24hrs, take pred 10mg  5. She is on Xarelto---will be on lifelong anticoagulation---  6) She has features of ERICA- including sleep disturbance, nocturnal desat, and excessive fatigue- HST was negative for ERICA in 2014 but had desaturation. Will try to repeat HST at next visit  8. Follows w/[place] [facility]----  ---Also consulted with Dr Lou at [facility]---   As per patient, Dr Lou feels she is not a candidate at this time because of good health   Although Dr. Lou advised needs to lose wt to be considered for transplant listing in future  9.f/u in 8 weeks   Vivian Yoon MD, [facility]

## 2024-09-03 NOTE — HISTORY OF PRESENT ILLNESS
[Never] : never [TextBox_4] : --44  female----------Patient is here for follow up of her pulmonary htn. ------------------INITIALLY  REFD  WITH   ECHO [DONE  OUTSIDE ] ELEVATED PASP  DILATED RV AND RA---------  HAS BEEN TOLD IN THE PAST SHE HAS ??? ASTHMA-----long-standing history of dyspnea on exertion-----------------has baseline tachycardia ----...--s. No history of liver dysfunction , collagen vascular disorder or chronic thromboembolic disease. I would classify her dyspnea as WHO  FUNCTIONAL CLASS III-----------no calf tenderness----------SLEEP STUDY SHOWS AHI 0.6 but T 90  < 35 %-----DIAGNOSED  AS PAH ---------WAS ON ADMAPAS  NOW ON   SQ REMODULIN  [ SINCE MAY 2018]  ---S/P PACEMAKER PLACEMENT AT [place]  AND ON METOPROLOL-----   2/3/2022 tested positive on home covid test 2/2/2022- developed shortness of breath, nausea and vomitting- son is positive She is vaccinated for covid but not boosted Afebrile, drinking but not eating well. O2 sat 94% room air rest- 92% room air w/walk  2/7/2022 covid pcr negative - did not get MAB still with SOUZA - on prednisone with taper  4/2022 6mwt o2 sat 95% to 94% on oxygen 69meters (135m) stopped d/t severe SOB and elevated HR  4/15/2022  pulm htn - on remodulin 36 ng (having jaw pain sometimes), sildenafil 10 mg qd, furosemide 20 mg 5x weekly which helped w/edema. She is also having palpitations- has not yet followed up with DR Chavis in cardiology. Asthma-occas wheezing- awaiting start of dupixent Up to date w/covid vac- declines influenza vac. s/p consult with lung transplant team but needs to lower BMI before being considered- pt has been referred to nutritionist but has not scheduled appt   5/31/22- pulm htn - on remodulin 36 ng SQ (having jaw pain sometimes), sildenafil 10 mg qd, and diuretics daily- doing well from pulm perspectives Asthma currently under control- declining biologic therapy Was following lung  transplant team but currently on hold d/t BMI   7/11/22- pulm htn--on remodulin 36 ng SQ (having jaw pain sometimes), sildenafil 10 mg qd and diuretics daily [LASIX WO MG---FAZAL  was in the hospital at [facility]-- given Lasix --- we will slowly increase her dose of Remodulin  7/22/2022 pulm htn- breathing improved on higher dose of remodulin 40ng, having some diarrhea. Palpitations improved  9/15 /2022 ----s/p hospitalization for cardiogenic shock due to RV failure with markers of end organ dysfunction, started on  as well as Marcela. CTA negative for PE but TTE revealed RA thrombus with concern for clot in transit. She underwent catheter directed partial thrombus aspiration 8/31, course complicated by L fem pseudoaneurysm for which she received thrombin injection on 9/3. Course further complicated by S. Lugdunensis bacteremia on BCx from 8/29 s/p drainage of abscess at prior----on IV antibiotics as per ID Remodulin subcutaneous access site.--42 ng/kg/min , also on sildenafil 20 mg 3 times daily ----   SEPT 27 2022-----feels much better on subcu Remodulin 44 ng/kg/min and sildenafil 20 mg 3 times daily on diuretics-on anticoagulation-------- needs to see vascular for left femoral pseudoaneurysm--- H/O---S.Lugdunensis bacteremia on BCx from 8/29 s/p drainage of abscess at prior----on IV antibiotics as per ID --------ALSO HAS Lleft   fem pseudoaneurysm---needs follow up by  vasular ---- --received flu vaccine today------------------------  October 2022: Telehealth Visit via teledoc S/P hospitalization for cardiogenic shock c/b bacteremia on IV antibiotics. Followed by ID (Dr. Grover) for labs, antibiotics now d/c'd (10/6)  getting follow up labs w/ID (results pending from 10/19) Pulm htn -She reports use of Remodulin 44 ng/kg/min, tolerating new dose well  and Sildenafil (20 mg t.i.d PO) No resp complaints, no palpitations, follows cardiology Dr Paramjit Chavis. On diuretics, xarelto, O2 as needed s/p lung transplant eval- was told she was "too well" to be listed and needs to work on getting her BMI down  jan 2022--developed abdominal wall abscess--needs surgical drainage--------subcu Remodulin 44 ng/kg/min and sildenafil 20 mg 3 times daily on diuretics-on anticoagulation--  mario    10 2023--- recent admission to the hospital for abscess on abdominal wall which was drained and received antibiotics and it has healed----------------subcu Remodulin 44 ng/kg/min and sildenafil 20 mg 3 times daily on diuretics-on anticoagulation--I would like to increase Remodulin to 45 ng/kg/min--------- having some URI symptoms we will send a nasal swab continue diuretics----   3/2023---seems stable  pulm htn -remains on remodulin 44ng and sildenafil, diuretics per heart failure Dr Chavis c/o worsening SOUZA and nausea x 1week, chroic diarrhea which she attributes to remodulin, and lower abd pain ///??? pancreatitis Tacchycardia has improved, on continuous oxygen Accompanied by    5/2023 accompanied by  pulm htn -remains on remodulin 44ng and sildenafil, diuretics per heart failure Dr Chavis old remodulin site healing w/minimal drainage (previously treated w/antibiotics)  c/o nausea, lightheadedness, headache  june 2023--------accompanied by  pulm htn -remains on remodulin 46ng and sildenafil, diuretics per heart failure Dr Chavis -feesl btter--- less  nausea,---will increase   Remodulin to to 48 ng/kg/min  8/2023 43-year-old female w/ PMH of pulmonary HTN c/b right heart failure, multiple abdominal wall abscesses, right atrial thrombus s/p thrombectomy, asthma, anemia B WAS  admitted for right heart failure exacerbation in s/o pulmonary HTN. IN AUGUST 2023----  -  Problem: Right heart failure.-----  Plan: - In s/o pulmonary HTN - On remodulin, slidenafil, xarelto at home - Lasix 40mg qid, spironolactone 25 mg qd at home - On 2L O2 at home - - S/p diuresis with 40 mg IV lasix qd, per HF -- TTE (8/14) -> EF 55-60%, RV overload -- RHC (8/16) -> Elevated RV pressure c/w pulmonary HTN, elevated PCWP  [ BELIEVE THIS WEDGE PRESSURE IS ERRONEOUS AS PT HAS PREVIOSU EVDIECNE OF PRECAPILLARY PULMONARY HTN ] - - - - -  -At discharge patient was switched to  PO torsemide 20mg qd, per HF -------, using oxygen.   ------Pulm htn On remodulin 48ng- c/o diarrhea remains on sildenafil 20mg tid   12/2023 accomapnied by  , using oxygen.   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     Problem/Plan - 1: -  Problem: Abdominal wall abscess. -  Plan: Afebrile, VSS. Pt with history of prior abdominal wall abscesses iso Rimodulin pump, now with a new abscess CT A/P showing new 2.5 x 3 cm collection in L anterior abdominal wall s/p I&D in ED Prior wound cx Jan 2023: MRSA staph lugdunensis, sensitive in the past to cefazolin and doxy s/p cefazolin and doxy    4/2024 TTM with pt has menstrual cycle today and unable to come in to office c/o dyspnea- using oxygen at 3 lpm-slight dry cough and reports some wheezing - - - Remodulin SQ at 48ng- site cleaned/dressed by home RN  8/2024 Pulm htn w/Remodulin SQ at 48ng-and sildenafil - - Currently w/asthma exacerbation- itchy eye, mild congestion  9/2024 Pulm htn w/Remodulin SQ at 48ng-and sildenafil - - here to increase remodulin to 49ng w/observation as she is concerned for potential side effects from higher dose  Asthma has improved since last visit- off prednisone- taking singulair IGE>2000 - pt still relucatnt to start biologic therapy NEEDS PFT - 6 MWT [TextBox_100] : 1/2014 [TextBox_108] : 0.9 [TextBox_112] : 35.3

## 2024-09-05 NOTE — HISTORY OF PRESENT ILLNESS
[TextBox_4] : PMH: 44 y.o. female with PMH of anxiety, bradycardia s/p dual chamber pacemaker, right heart failure, asthma, pulmonary HTN, RA thrombus s/p thrombectomy, PE on Xarelto, multiple abdominal wall abscesses s/p I&D, previously evaluated for lung transplant 10/2021. She was advised to lose weight as BMI at the time was 41.9, with goal of 170-180 lbs, pulm rehab referral was provided, and recommended sleep study. Currently on Remodulin and sildenafil and follows with Dr. Yoon for management of pulmonary HTN.    Clinic Visit 9/6/24: Presnts for f/u visit with Lung transplant team. Reports***** Oxygen saturation   % on   LNC during visit. Follows with [facility] Lung Transplant team- ??open evaluation and testing?*** Was seen by Dr. Yoon primary pulmonologist 9/3/24. Remains on Sildenafil and Remodulin 49ng/kg/min (pHTN), torsesmide and Aldactone    When/how diagnosed with primary pulm dx?     MEDICATIONS Atrovent HFA 17 MCG/ACT INHALE 1 OR 2 PUFFS 4 TIMES DAILY PRN Azelastine HCl - 0.15 % Nasal Solution; 2 sprays each nostril BID Budesonide 0.5 MG/2ML Inhalation Suspension; USE 1 UNIT DOSE VIA NEBULIZER BID Dupixent 300 MG/2ML Subcutaneous Solution Pen-injector; 600mg loading dose then 300mg every 2 weeks Epinephrine 0.3 MG/0.3ML Injection Solution Auto-injector; INJECT 0.3ML INTRAMUSCULARLY AS DIRECTED Fluticasone Propionate 50 MCG/ACT USE 1 TO 2 SPRAYS IN EACH NOSTRIL DAILY Furosemide 20 MG Oral Tablet; TAKE 1 TABLET BY MOUTH 5 TIMES PER WEEK Hydroxyzine HCl - 25 MG Oral Tablet Ipratropium Bromide 0.02 % Inhalation Solution; USE 1 UNIT DOSE IN NEBULIZER 4 TIMES DAILY (Asthma) Levalbuterol HCl - 0.63 MG/3ML Inhalation Nebulization Solution; USE 1 UNIT DOSE EVERY 4-6 HOURS PRN (Asthma) Miconazole Antifungal 2 % External Cream; APPLY SPARINGLY TO AFFECTED AREA(S) TWICE DAILY Montelukast Sodium 10 MG Oral Tablet; take 1 tablet at bedtime (Asthma) Mucinex 600 MG Oral Tablet Extended Release 12 Hour; TAKE 1 TABLET EVERY 12 HOURS AS NEEDED FOR CONGESTION Omeprazole 40 MG daily Ondansetron 4 MG Oral Tablet Disintegrating Q12h PRN Potassium Chloride ER 10 MEQ Oral Tablet Extended Release; TAKE 1 TABLET DAILY PredniSONE 10 MG Oral Tablet; TAKE 1 TABLET DAILY ***** Remodulin 49ng/kg/min (pHTN) Sildenafil Citrate 20 MG Oral Tablet; TAKE 0.5 TABLET Daily (pHTN) Spironolactone 25 MG Oral Tablet; Take 1 tablet daily (HF) Vitamin B-12 1000 MCG Sublingual Tablet Sublingual; DISSOLVE 1000 MCG Daily Vitamin D (Ergocalciferol) 1.25 MG (98813 UT) Oral Capsule; TAKE 1 CAPSULE BY MOUTH ONCE A WEEK Xarelto 10 MG Oral Tablet; Take 1 tablet daily (lifelong anticoagulation)   Allergies: Adhesive tape, PCN, Vancomycin   Oxygen requirement *** at rest *** on exertion *** at night   Quality of life: ****    Functional status:***** [] performs activities of daily living with NO assistance [] performs activities of daily living with SOME assistance [] performs activities of daily living with TOTAL assistance   Exposures:*****   Prior hospitalizations, ICU admission or intubations:    Hx covid infection: Hx blood transfusions: Hx pregnancies:   Health maintenance/vaccines: COVID vax:  Flu: PNA:  Family History: Mother: Father: Siblings:  Social History: ***** Lives with: ETOH use: Tobacco use:denies    DATA REVIEWED:  PFTs  FVC: FEV1: TLC: DLCO:     6MWT: 66 meters    CT Angio CHEST 10/14/23 FINDINGS:  LUNGS AND AIRWAYS: Patent central airways.  Mild patchy opacities are seen throughout both lungs. This is a nonspecific finding but the major differential considerations include both edema and infection. Correlate clinically. PLEURA: No pleural effusion. MEDIASTINUM AND ZEN: No lymphadenopathy. VESSELS: No pulmonary embolism. Dilated pulmonary arteries consistent with the provided history of pulmonary hypertension. HEART: Heart size is normal. No pericardial effusion. Cardiac pacemaker leads. CHEST WALL AND LOWER NECK: Within normal limits. VISUALIZED UPPER ABDOMEN: Within normal limits. BONES: Within normal limits.  IMPRESSION:  No pulmonary embolism. Dilated pulmonary arteries consistent with the provided history of pulmonary hypertension.  Mild patchy opacities are seen throughout both lungs. This is a nonspecific finding but the major differential considerations include both edema and infection. Correlate clinically.     ECHO 7/16/2024 CONCLUSIONS:  1. LV cavity is small. LV wall thickness is moderately increased. The interventricular septum is flattened in systole and diastole consistent with RV pressure and volume overload. LV systolic function is normal.  2. Mildly enlarged RV cavity size, with normal wall thickness, and normal RV systolic function. Tricuspid annular plane systolic excursion (TAPSE) is 2.1 cm (normal >=1.7 cm).  3. Right ventricular free wall strain is --17 %.  4. Compared to the transthoracic echocardiogram performed on 8/14/2023, overall findings are similar. PASP not estimated on prior exam.  5. Estimated pulmonary artery systolic pressure is 64 mmHg, consistent with severe pulmonary hypertension.   RHC/LHC: n/a

## 2024-09-05 NOTE — HISTORY OF PRESENT ILLNESS
[TextBox_4] : PMH: 44 y.o. female with PMH of anxiety, bradycardia s/p dual chamber pacemaker, right heart failure, asthma, pulmonary HTN, RA thrombus s/p thrombectomy, PE on Xarelto, multiple abdominal wall abscesses s/p I&D, previously evaluated for lung transplant 10/2021. She was advised to lose weight as BMI at the time was 41.9, with goal of 170-180 lbs, pulm rehab referral was provided, and recommended sleep study. Currently on Remodulin and sildenafil and follows with Dr. Yoon for management of pulmonary HTN.    Clinic Visit 9/6/24: Presnts for f/u visit with Lung transplant team. Reports***** Oxygen saturation   % on   LNC during visit. Follows with [facility] Lung Transplant team- ??open evaluation and testing?*** Was seen by Dr. Yoon primary pulmonologist 9/3/24. Remains on Sildenafil and Remodulin 49ng/kg/min (pHTN), torsesmide and Aldactone    When/how diagnosed with primary pulm dx?     MEDICATIONS Atrovent HFA 17 MCG/ACT INHALE 1 OR 2 PUFFS 4 TIMES DAILY PRN Azelastine HCl - 0.15 % Nasal Solution; 2 sprays each nostril BID Budesonide 0.5 MG/2ML Inhalation Suspension; USE 1 UNIT DOSE VIA NEBULIZER BID Dupixent 300 MG/2ML Subcutaneous Solution Pen-injector; 600mg loading dose then 300mg every 2 weeks Epinephrine 0.3 MG/0.3ML Injection Solution Auto-injector; INJECT 0.3ML INTRAMUSCULARLY AS DIRECTED Fluticasone Propionate 50 MCG/ACT USE 1 TO 2 SPRAYS IN EACH NOSTRIL DAILY Furosemide 20 MG Oral Tablet; TAKE 1 TABLET BY MOUTH 5 TIMES PER WEEK Hydroxyzine HCl - 25 MG Oral Tablet Ipratropium Bromide 0.02 % Inhalation Solution; USE 1 UNIT DOSE IN NEBULIZER 4 TIMES DAILY (Asthma) Levalbuterol HCl - 0.63 MG/3ML Inhalation Nebulization Solution; USE 1 UNIT DOSE EVERY 4-6 HOURS PRN (Asthma) Miconazole Antifungal 2 % External Cream; APPLY SPARINGLY TO AFFECTED AREA(S) TWICE DAILY Montelukast Sodium 10 MG Oral Tablet; take 1 tablet at bedtime (Asthma) Mucinex 600 MG Oral Tablet Extended Release 12 Hour; TAKE 1 TABLET EVERY 12 HOURS AS NEEDED FOR CONGESTION Omeprazole 40 MG daily Ondansetron 4 MG Oral Tablet Disintegrating Q12h PRN Potassium Chloride ER 10 MEQ Oral Tablet Extended Release; TAKE 1 TABLET DAILY PredniSONE 10 MG Oral Tablet; TAKE 1 TABLET DAILY ***** Remodulin 49ng/kg/min (pHTN) Sildenafil Citrate 20 MG Oral Tablet; TAKE 0.5 TABLET Daily (pHTN) Spironolactone 25 MG Oral Tablet; Take 1 tablet daily (HF) Vitamin B-12 1000 MCG Sublingual Tablet Sublingual; DISSOLVE 1000 MCG Daily Vitamin D (Ergocalciferol) 1.25 MG (09278 UT) Oral Capsule; TAKE 1 CAPSULE BY MOUTH ONCE A WEEK Xarelto 10 MG Oral Tablet; Take 1 tablet daily (lifelong anticoagulation)   Allergies: Adhesive tape, PCN, Vancomycin   Oxygen requirement *** at rest *** on exertion *** at night   Quality of life: ****    Functional status:***** [] performs activities of daily living with NO assistance [] performs activities of daily living with SOME assistance [] performs activities of daily living with TOTAL assistance   Exposures:*****   Prior hospitalizations, ICU admission or intubations:    Hx covid infection: Hx blood transfusions: Hx pregnancies:   Health maintenance/vaccines: COVID vax:  Flu: PNA:  Family History: Mother: Father: Siblings:  Social History: ***** Lives with: ETOH use: Tobacco use:denies    DATA REVIEWED:  PFTs  FVC: FEV1: TLC: DLCO:     6MWT: 66 meters    CT Angio CHEST 10/14/23 FINDINGS:  LUNGS AND AIRWAYS: Patent central airways.  Mild patchy opacities are seen throughout both lungs. This is a nonspecific finding but the major differential considerations include both edema and infection. Correlate clinically. PLEURA: No pleural effusion. MEDIASTINUM AND ZEN: No lymphadenopathy. VESSELS: No pulmonary embolism. Dilated pulmonary arteries consistent with the provided history of pulmonary hypertension. HEART: Heart size is normal. No pericardial effusion. Cardiac pacemaker leads. CHEST WALL AND LOWER NECK: Within normal limits. VISUALIZED UPPER ABDOMEN: Within normal limits. BONES: Within normal limits.  IMPRESSION:  No pulmonary embolism. Dilated pulmonary arteries consistent with the provided history of pulmonary hypertension.  Mild patchy opacities are seen throughout both lungs. This is a nonspecific finding but the major differential considerations include both edema and infection. Correlate clinically.     ECHO 7/16/2024 CONCLUSIONS:  1. LV cavity is small. LV wall thickness is moderately increased. The interventricular septum is flattened in systole and diastole consistent with RV pressure and volume overload. LV systolic function is normal.  2. Mildly enlarged RV cavity size, with normal wall thickness, and normal RV systolic function. Tricuspid annular plane systolic excursion (TAPSE) is 2.1 cm (normal >=1.7 cm).  3. Right ventricular free wall strain is --17 %.  4. Compared to the transthoracic echocardiogram performed on 8/14/2023, overall findings are similar. PASP not estimated on prior exam.  5. Estimated pulmonary artery systolic pressure is 64 mmHg, consistent with severe pulmonary hypertension.   RHC/LHC: n/a

## 2024-09-06 ENCOUNTER — NON-APPOINTMENT (OUTPATIENT)
Age: 44
End: 2024-09-06

## 2024-09-06 ENCOUNTER — APPOINTMENT (OUTPATIENT)
Dept: PULMONOLOGY | Facility: CLINIC | Age: 44
End: 2024-09-06

## 2024-09-11 NOTE — DIETITIAN INITIAL EVALUATION ADULT. - PERTINENT LABORATORY DATA
11-03 @ 06:21: Na 135, BUN 20, Cr 0.96, BG 90, K+ 3.4<L>, Phos 3.5, Mg 2.1
see chief complaint quote

## 2024-09-13 RX ORDER — DOXYCYCLINE HYCLATE 100 MG/1
100 TABLET ORAL
Qty: 14 | Refills: 0 | Status: ACTIVE | COMMUNITY
Start: 2024-09-13 | End: 1900-01-01

## 2024-09-16 ENCOUNTER — NON-APPOINTMENT (OUTPATIENT)
Age: 44
End: 2024-09-16

## 2024-09-17 ENCOUNTER — APPOINTMENT (OUTPATIENT)
Dept: ELECTROPHYSIOLOGY | Facility: CLINIC | Age: 44
End: 2024-09-17
Payer: MEDICAID

## 2024-09-17 PROCEDURE — 93294 REM INTERROG EVL PM/LDLS PM: CPT

## 2024-09-17 PROCEDURE — 93296 REM INTERROG EVL PM/IDS: CPT

## 2024-10-07 ENCOUNTER — NON-APPOINTMENT (OUTPATIENT)
Age: 44
End: 2024-10-07

## 2024-10-13 ENCOUNTER — INPATIENT (INPATIENT)
Facility: HOSPITAL | Age: 44
LOS: 16 days | Discharge: HOME | DRG: 316 | End: 2024-10-30
Attending: STUDENT IN AN ORGANIZED HEALTH CARE EDUCATION/TRAINING PROGRAM | Admitting: HOSPITALIST
Payer: MEDICAID

## 2024-10-13 VITALS
TEMPERATURE: 98 F | WEIGHT: 220.02 LBS | OXYGEN SATURATION: 99 % | HEIGHT: 65 IN | HEART RATE: 120 BPM | RESPIRATION RATE: 18 BRPM | SYSTOLIC BLOOD PRESSURE: 127 MMHG | DIASTOLIC BLOOD PRESSURE: 73 MMHG

## 2024-10-13 DIAGNOSIS — Z98.51 TUBAL LIGATION STATUS: Chronic | ICD-10-CM

## 2024-10-13 DIAGNOSIS — Z95.0 PRESENCE OF CARDIAC PACEMAKER: Chronic | ICD-10-CM

## 2024-10-13 LAB
ADD ON TEST-SPECIMEN IN LAB: SIGNIFICANT CHANGE UP
APTT BLD: 53.4 SEC — HIGH (ref 24.5–35.6)
GAS PNL BLDV: SIGNIFICANT CHANGE UP
INR BLD: 2.41 RATIO — HIGH (ref 0.85–1.16)
PROTHROM AB SERPL-ACNC: 27.5 SEC — HIGH (ref 9.9–13.4)

## 2024-10-13 PROCEDURE — 99291 CRITICAL CARE FIRST HOUR: CPT

## 2024-10-13 PROCEDURE — 71046 X-RAY EXAM CHEST 2 VIEWS: CPT | Mod: 26

## 2024-10-13 NOTE — ED PROCEDURE NOTE - PROCEDURE ADDITIONAL DETAILS
POCUS: Emergency Department Focused Ultrasound performed at patient's bedside.  The images were saved and will be viewable in PACS.  The complete report will be available in PACS. POCUS: Emergency Department Focused Ultrasound performed at patient's bedside.  The images were saved and will be viewable in PACS.  The complete report will be available in PACS.    Peripheral IV access in the Emergency Department obtained under dynamic ultrasound guidance with dark nonpulsatile blood return.  Catheter was flushed afterwards without any resistance or resulting extravasation.  IV catheter confirmed in compressible vein after insertion. 20 gauge catheter placed in a peripheral vein in the left upper extremity. Images saved, to be uploaded to PACS.

## 2024-10-13 NOTE — ED ADULT NURSE NOTE - NSFALLUNIVINTERV_ED_ALL_ED
Bed/Stretcher in lowest position, wheels locked, appropriate side rails in place/Call bell, personal items and telephone in reach/Instruct patient to call for assistance before getting out of bed/chair/stretcher/Non-slip footwear applied when patient is off stretcher/Jensen to call system/Physically safe environment - no spills, clutter or unnecessary equipment/Purposeful proactive rounding/Room/bathroom lighting operational, light cord in reach

## 2024-10-13 NOTE — ED PROVIDER NOTE - NSICDXPASTSURGICALHX_GEN_ALL_CORE_FT
PAST SURGICAL HISTORY:  H/O tubal ligation     History of pacemaker 12/19 - [employer] model Ingevity 4469    History of tubal ligation     Pacemaker

## 2024-10-13 NOTE — ED PROVIDER NOTE - QTC
Diagnosis: Acute hypercapnic respiratory failure [1371463]   Admitting Provider:: ADY GÓMEZ [[id number]]   Future Attending Provider: ADY GÓMEZ [[id number]]   Reason for IP Medical Treatment  (Clinical interventions that can only be accomplished in the IP setting? ) :: Acute hypercapneic respiratory failure   I certify that Inpatient services for greater than or equal to 2 midnights are medically necessary:: No   Plans for Post-Acute care--if anticipated (pick the single best option):: A. No post acute care anticipated at this time   Special Needs:: No Special Needs [1]   537

## 2024-10-13 NOTE — ED PROVIDER NOTE - PHYSICAL EXAMINATION
GEN: awake and alert, well-appearing, no acute distress  CV: (+) RRR, (-) systolic murmur  RESP: (+) 98% on 2LO2; (+) CTABL, (-) increased WOB, (-) rales, (-) rhonchi, (-) wheezing  ABD: (+) soft, (+) epigastric tenderness, (-) guarding  EXT: (-) joint deformities, (-) edema, (-) tenderness, (+) grossly intact ROM, (+) equal pulses in upper & lower extremities  NEURO: mental status as above, (-) gross strength/sensation deficits GEN: awake and alert, well-appearing, no acute distress  CV: (+) RRR, (-) systolic murmur  RESP: (+) 98% on 2LO2; (+) CTABL, (-) increased WOB, (-) rales, (-) rhonchi, (-) wheezing  ABD: (+) soft, (+) epigastric tenderness, (-) guarding, (-) RUQ TTP, (-) abdominal wall erythema/induration/fluctuance  EXT: (-) joint deformities, (-) edema, (-) tenderness, (+) grossly intact ROM, (+) equal pulses in upper & lower extremities  NEURO: mental status as above, (-) gross strength/sensation deficits

## 2024-10-13 NOTE — ED ADULT NURSE NOTE - NSFALLRISKASMTTYPE_ED_ALL_ED
[facility]
[address]
[place]   [address]                     PATHOLOGY RPT PROCEDURE       
_______________________________________________________________________________
 
Name:       ABDOULAYE KEANE              Room #:         200-I       DIS IN  
M.R.#:      [id number]     Account #:      [id number]  
Admission:  11/21/19    Date of Birth:  [birth year]  
Discharge:  11/23/19                                    Report #:    [id number]
                                                        Path Case #: [id number]
_______________________________________________________________________________
Note
[facility] Accession Number: [id number]
   TESTS               RESULT  FLAG  UNITS    REF RANGE  LAB
------------------------------------------------------------
   Clinician Provided Cytology Information
   No. of containers..01 Other (Miscellaneous)
Source:                                                   01
   ABDOMINAL FLUID
DIAGNOSIS:                                                02
   ABDOMINAL FLUID
   NEGATIVE FOR MALIGNANT EPITHELIAL CELLS.
   REACTIVE MESOTHELIAL CELLS ARE PRESENT.
   THIS INTERPRETATION INCLUDES EVALUATION OF A CELL BLOCK.
Pathologist ICD10:                                        02
   R18.8
Signed out by:                                            02
   Evelyn Pimentel MD, Pathologist
   NPI- [id number]
Performed by:                                             01
   Rj Clark Cytotechnologist ([facility])
Gross description:                                        01
   10 ML, YELLOW, CLEAR
   /LCS  12/31/1840  0000 Local
 
------------------------------------------------------------
    FLAG LEGEND:
    L-Low Normal,H-High Normal,LL-Alert Low,HH-Alert High
    <-Panic Low,>-Panic High,A-Abnormal,AA-Critical Abnormal
------------------------------------------------------------
 
Performed at:
01 [address]
   [place]  [address]
   Kwame Gant MD, Phone: [phone]
[address]  [address]
   Evelyn Pimentel MD, Phone: [phone]
Specimen Comment: A courtesy copy of this report has been sent to [phone]
Specimen Comment: [id number]
Specimen Comment: Report sent to 
***Performed at:  01
   [address], [place], [place]  [id number]
   MD Kwame Gant MD Phone:  [phone] Initial (On Arrival)

## 2024-10-13 NOTE — ED PROVIDER NOTE - IV ALTEPLASE EXCL ABS HIDDEN
Status post ORIF.  Nonweightbearing  Consult PT OT  Placed DME order for front wheeled walker  Continue as needed support   show

## 2024-10-13 NOTE — ED ADULT NURSE NOTE - OBJECTIVE STATEMENT
45 y/o F presents to ED with c/o excessive sweating elevated HR x yesterday. PMH DVT in right upper extremity,  pulmonary hypertension, heart failure, pacemaker due to bradycardia, on Xarelto, asthma requiring 2L oxygen at baseline. Per pt Patient's cardiologist is Dr. Nunez. Pt states last time she was diaphoretic & she is today she was hospitalized in the ICU.  Pt states she feels generalized weakness and left arm pain with no radiation.  Pt states left arm pain feels like her defibrillator going off as it did in the past.  Pt states that defibrillator went off x 1. Pt a&ox4, VS see flow sheet. MD aware of tachycardia and showing sinus tach on cardiac monitor in leas II. Pt placed in position of comfort. Pt educated on call bell system and provided call bell. Bed in lowest position, wheels locked, appropriate side rails raised. Pt denies needs at this time.  at bedside

## 2024-10-13 NOTE — ED PROVIDER NOTE - CLINICAL SUMMARY MEDICAL DECISION MAKING FREE TEXT BOX
I was the supervising attending. I have independently seen face-to-face and examined the patient with the resident. I have reviewed the history and physical and discussed the MDM with the resident. I agree with the assessment and plan as presented unless otherwise documented as follows:    44F hx RH failure & pulmonary HTN, s/p PPM for bradycardia, prior DVT/PE on Xarelto, asthma, CAD, prior tubal ligation, presenting w/ palpitations. Patient endorses progressively worsening high HR at rest and with standing/exertion, as high as 140. Additionally endorses associated sx of epigastric/centralized chest discomfort, SOB, nausea, feeling "hot and cold," and generalized fatigue. Symptoms occasionally provoked by exertion but also sometimes present at rest. Otherwise denies cough, vomiting, diarrhea, leg pain/swelling. On torsemide and spironolactone. Appears very mildly dyspneic but overall awake and alert, no significant distress. Exam as above, no overt signs of fluid overload. Bedside POCUS with severe RV dilation, A-line predominance w/ no pleural effusions, enlarged IVC but with respiratory variation, LVEF grossly intact. EKG w/ sinus tach, RH strain pattern but also present on prior EKG 7/2024. DDx HF exacerbation vs. ACS vs. recurrent PE, will obtain labs, XR, CTA. -Nelida Guillen MD (Attending) I was the supervising attending. I have independently seen face-to-face and examined the patient with the resident. I have reviewed the history and physical and discussed the MDM with the resident. I agree with the assessment and plan as presented unless otherwise documented as follows:    44F hx RH failure & pulmonary HTN, s/p PPM for bradycardia, prior DVT/PE on Xarelto, asthma, CAD, prior abdominal wall abscesses, prior tubal ligation, presenting w/ palpitations. Patient endorses progressively worsening high HR at rest and with standing/exertion, as high as 140. Additionally endorses associated sx of epigastric/centralized chest discomfort, SOB, nausea, feeling "hot and cold," and generalized fatigue. Symptoms occasionally provoked by exertion but also sometimes present at rest. Otherwise denies cough, vomiting, diarrhea, leg pain/swelling. On torsemide and spironolactone. Additionally denies symptoms similar to prior abdominal wall infections such as areas of skin redness/thickening/appearance change. Appears very mildly dyspneic but overall awake and alert, no significant distress. Exam as above, no overt signs of fluid overload. Bedside POCUS with severe RV dilation, A-line predominance w/ no pleural effusions, enlarged IVC but with respiratory variation, LVEF grossly intact. EKG w/ sinus tach, RH strain pattern but also present on prior EKG 7/2024. DDx HF exacerbation vs. ACS vs. recurrent PE, will obtain labs, XR, CTA. -Nelida Guillen MD (Attending)

## 2024-10-14 ENCOUNTER — RESULT REVIEW (OUTPATIENT)
Age: 44
End: 2024-10-14

## 2024-10-14 DIAGNOSIS — Z29.9 ENCOUNTER FOR PROPHYLACTIC MEASURES, UNSPECIFIED: ICD-10-CM

## 2024-10-14 DIAGNOSIS — R10.13 EPIGASTRIC PAIN: ICD-10-CM

## 2024-10-14 DIAGNOSIS — I27.20 PULMONARY HYPERTENSION, UNSPECIFIED: ICD-10-CM

## 2024-10-14 DIAGNOSIS — J45.909 UNSPECIFIED ASTHMA, UNCOMPLICATED: ICD-10-CM

## 2024-10-14 DIAGNOSIS — I50.810 RIGHT HEART FAILURE, UNSPECIFIED: ICD-10-CM

## 2024-10-14 DIAGNOSIS — D72.829 ELEVATED WHITE BLOOD CELL COUNT, UNSPECIFIED: ICD-10-CM

## 2024-10-14 DIAGNOSIS — I26.99 OTHER PULMONARY EMBOLISM WITHOUT ACUTE COR PULMONALE: ICD-10-CM

## 2024-10-14 DIAGNOSIS — J96.11 CHRONIC RESPIRATORY FAILURE WITH HYPOXIA: ICD-10-CM

## 2024-10-14 LAB
ALBUMIN SERPL ELPH-MCNC: 3.7 G/DL — SIGNIFICANT CHANGE UP (ref 3.3–5)
ALP SERPL-CCNC: 62 U/L — SIGNIFICANT CHANGE UP (ref 40–120)
ALT FLD-CCNC: 10 U/L — SIGNIFICANT CHANGE UP (ref 10–45)
ANION GAP SERPL CALC-SCNC: 12 MMOL/L — SIGNIFICANT CHANGE UP (ref 5–17)
ANION GAP SERPL CALC-SCNC: 13 MMOL/L — SIGNIFICANT CHANGE UP (ref 5–17)
ANISOCYTOSIS BLD QL: SIGNIFICANT CHANGE UP
APPEARANCE UR: CLEAR — SIGNIFICANT CHANGE UP
AST SERPL-CCNC: 13 U/L — SIGNIFICANT CHANGE UP (ref 10–40)
BASOPHILS # BLD AUTO: 0.08 K/UL — SIGNIFICANT CHANGE UP (ref 0–0.2)
BASOPHILS # BLD AUTO: 0.12 K/UL — SIGNIFICANT CHANGE UP (ref 0–0.2)
BASOPHILS NFR BLD AUTO: 0.6 % — SIGNIFICANT CHANGE UP (ref 0–2)
BASOPHILS NFR BLD AUTO: 0.9 % — SIGNIFICANT CHANGE UP (ref 0–2)
BILIRUB SERPL-MCNC: 1 MG/DL — SIGNIFICANT CHANGE UP (ref 0.2–1.2)
BILIRUB UR-MCNC: NEGATIVE — SIGNIFICANT CHANGE UP
BLD GP AB SCN SERPL QL: NEGATIVE — SIGNIFICANT CHANGE UP
BUN SERPL-MCNC: 15 MG/DL — SIGNIFICANT CHANGE UP (ref 7–23)
BUN SERPL-MCNC: 17 MG/DL — SIGNIFICANT CHANGE UP (ref 7–23)
CALCIUM SERPL-MCNC: 8.3 MG/DL — LOW (ref 8.4–10.5)
CALCIUM SERPL-MCNC: 8.4 MG/DL — SIGNIFICANT CHANGE UP (ref 8.4–10.5)
CHLORIDE SERPL-SCNC: 105 MMOL/L — SIGNIFICANT CHANGE UP (ref 96–108)
CHLORIDE SERPL-SCNC: 99 MMOL/L — SIGNIFICANT CHANGE UP (ref 96–108)
CO2 SERPL-SCNC: 19 MMOL/L — LOW (ref 22–31)
CO2 SERPL-SCNC: 20 MMOL/L — LOW (ref 22–31)
COLOR SPEC: YELLOW — SIGNIFICANT CHANGE UP
CREAT SERPL-MCNC: 0.98 MG/DL — SIGNIFICANT CHANGE UP (ref 0.5–1.3)
CREAT SERPL-MCNC: 1.13 MG/DL — SIGNIFICANT CHANGE UP (ref 0.5–1.3)
DIFF PNL FLD: NEGATIVE — SIGNIFICANT CHANGE UP
EGFR: 62 ML/MIN/1.73M2 — SIGNIFICANT CHANGE UP
EGFR: 73 ML/MIN/1.73M2 — SIGNIFICANT CHANGE UP
ELLIPTOCYTES BLD QL SMEAR: SLIGHT — SIGNIFICANT CHANGE UP
EOSINOPHIL # BLD AUTO: 0.07 K/UL — SIGNIFICANT CHANGE UP (ref 0–0.5)
EOSINOPHIL # BLD AUTO: 0.34 K/UL — SIGNIFICANT CHANGE UP (ref 0–0.5)
EOSINOPHIL NFR BLD AUTO: 0.5 % — SIGNIFICANT CHANGE UP (ref 0–6)
EOSINOPHIL NFR BLD AUTO: 2.6 % — SIGNIFICANT CHANGE UP (ref 0–6)
FLUAV AG NPH QL: SIGNIFICANT CHANGE UP
FLUBV AG NPH QL: SIGNIFICANT CHANGE UP
GIANT PLATELETS BLD QL SMEAR: PRESENT — SIGNIFICANT CHANGE UP
GLUCOSE SERPL-MCNC: 124 MG/DL — HIGH (ref 70–99)
GLUCOSE SERPL-MCNC: 99 MG/DL — SIGNIFICANT CHANGE UP (ref 70–99)
GLUCOSE UR QL: NEGATIVE MG/DL — SIGNIFICANT CHANGE UP
HCG SERPL-ACNC: <2 MIU/ML — SIGNIFICANT CHANGE UP
HCT VFR BLD CALC: 43.3 % — SIGNIFICANT CHANGE UP (ref 34.5–45)
HCT VFR BLD CALC: 43.6 % — SIGNIFICANT CHANGE UP (ref 34.5–45)
HGB BLD-MCNC: 13.3 G/DL — SIGNIFICANT CHANGE UP (ref 11.5–15.5)
HGB BLD-MCNC: 13.6 G/DL — SIGNIFICANT CHANGE UP (ref 11.5–15.5)
IMM GRANULOCYTES NFR BLD AUTO: 0.4 % — SIGNIFICANT CHANGE UP (ref 0–0.9)
KETONES UR-MCNC: NEGATIVE MG/DL — SIGNIFICANT CHANGE UP
LEUKOCYTE ESTERASE UR-ACNC: NEGATIVE — SIGNIFICANT CHANGE UP
LIDOCAIN IGE QN: 16 U/L — SIGNIFICANT CHANGE UP (ref 7–60)
LYMPHOCYTES # BLD AUTO: 1.84 K/UL — SIGNIFICANT CHANGE UP (ref 1–3.3)
LYMPHOCYTES # BLD AUTO: 14.2 % — SIGNIFICANT CHANGE UP (ref 13–44)
LYMPHOCYTES # BLD AUTO: 23.5 % — SIGNIFICANT CHANGE UP (ref 13–44)
LYMPHOCYTES # BLD AUTO: 3.07 K/UL — SIGNIFICANT CHANGE UP (ref 1–3.3)
MAGNESIUM SERPL-MCNC: 1.8 MG/DL — SIGNIFICANT CHANGE UP (ref 1.6–2.6)
MAGNESIUM SERPL-MCNC: 1.9 MG/DL — SIGNIFICANT CHANGE UP (ref 1.6–2.6)
MANUAL SMEAR VERIFICATION: SIGNIFICANT CHANGE UP
MCHC RBC-ENTMCNC: 21 PG — LOW (ref 27–34)
MCHC RBC-ENTMCNC: 21.4 PG — LOW (ref 27–34)
MCHC RBC-ENTMCNC: 30.5 GM/DL — LOW (ref 32–36)
MCHC RBC-ENTMCNC: 31.4 GM/DL — LOW (ref 32–36)
MCV RBC AUTO: 68 FL — LOW (ref 80–100)
MCV RBC AUTO: 69 FL — LOW (ref 80–100)
MICROCYTES BLD QL: SIGNIFICANT CHANGE UP
MONOCYTES # BLD AUTO: 0.84 K/UL — SIGNIFICANT CHANGE UP (ref 0–0.9)
MONOCYTES # BLD AUTO: 0.9 K/UL — SIGNIFICANT CHANGE UP (ref 0–0.9)
MONOCYTES NFR BLD AUTO: 6.5 % — SIGNIFICANT CHANGE UP (ref 2–14)
MONOCYTES NFR BLD AUTO: 6.9 % — SIGNIFICANT CHANGE UP (ref 2–14)
NEUTROPHILS # BLD AUTO: 10.1 K/UL — HIGH (ref 1.8–7.4)
NEUTROPHILS # BLD AUTO: 8.65 K/UL — HIGH (ref 1.8–7.4)
NEUTROPHILS NFR BLD AUTO: 66.1 % — SIGNIFICANT CHANGE UP (ref 43–77)
NEUTROPHILS NFR BLD AUTO: 77.8 % — HIGH (ref 43–77)
NITRITE UR-MCNC: NEGATIVE — SIGNIFICANT CHANGE UP
NRBC # BLD: 0 /100 WBCS — SIGNIFICANT CHANGE UP (ref 0–0)
NT-PROBNP SERPL-SCNC: 1861 PG/ML — HIGH (ref 0–300)
OVALOCYTES BLD QL SMEAR: SLIGHT — SIGNIFICANT CHANGE UP
PH UR: 6 — SIGNIFICANT CHANGE UP (ref 5–8)
PHOSPHATE SERPL-MCNC: 3.1 MG/DL — SIGNIFICANT CHANGE UP (ref 2.5–4.5)
PHOSPHATE SERPL-MCNC: 3.1 MG/DL — SIGNIFICANT CHANGE UP (ref 2.5–4.5)
PLAT MORPH BLD: NORMAL — SIGNIFICANT CHANGE UP
PLATELET # BLD AUTO: 346 K/UL — SIGNIFICANT CHANGE UP (ref 150–400)
PLATELET # BLD AUTO: 358 K/UL — SIGNIFICANT CHANGE UP (ref 150–400)
POIKILOCYTOSIS BLD QL AUTO: SIGNIFICANT CHANGE UP
POTASSIUM SERPL-MCNC: 3.7 MMOL/L — SIGNIFICANT CHANGE UP (ref 3.5–5.3)
POTASSIUM SERPL-MCNC: 4 MMOL/L — SIGNIFICANT CHANGE UP (ref 3.5–5.3)
POTASSIUM SERPL-SCNC: 3.7 MMOL/L — SIGNIFICANT CHANGE UP (ref 3.5–5.3)
POTASSIUM SERPL-SCNC: 4 MMOL/L — SIGNIFICANT CHANGE UP (ref 3.5–5.3)
PROT SERPL-MCNC: 6.6 G/DL — SIGNIFICANT CHANGE UP (ref 6–8.3)
PROT UR-MCNC: NEGATIVE MG/DL — SIGNIFICANT CHANGE UP
RAPID RVP RESULT: SIGNIFICANT CHANGE UP
RBC # BLD: 6.32 M/UL — HIGH (ref 3.8–5.2)
RBC # BLD: 6.37 M/UL — HIGH (ref 3.8–5.2)
RBC # FLD: 19.7 % — HIGH (ref 10.3–14.5)
RBC # FLD: 19.9 % — HIGH (ref 10.3–14.5)
RBC BLD AUTO: ABNORMAL
RH IG SCN BLD-IMP: POSITIVE — SIGNIFICANT CHANGE UP
RSV RNA NPH QL NAA+NON-PROBE: SIGNIFICANT CHANGE UP
SARS-COV-2 RNA SPEC QL NAA+PROBE: SIGNIFICANT CHANGE UP
SARS-COV-2 RNA SPEC QL NAA+PROBE: SIGNIFICANT CHANGE UP
SODIUM SERPL-SCNC: 131 MMOL/L — LOW (ref 135–145)
SODIUM SERPL-SCNC: 137 MMOL/L — SIGNIFICANT CHANGE UP (ref 135–145)
SP GR SPEC: 1.02 — SIGNIFICANT CHANGE UP (ref 1–1.03)
TROPONIN T, HIGH SENSITIVITY RESULT: 10 NG/L — SIGNIFICANT CHANGE UP (ref 0–51)
TROPONIN T, HIGH SENSITIVITY RESULT: 11 NG/L — SIGNIFICANT CHANGE UP (ref 0–51)
UROBILINOGEN FLD QL: 2 MG/DL (ref 0.2–1)
WBC # BLD: 12.98 K/UL — HIGH (ref 3.8–10.5)
WBC # BLD: 13.08 K/UL — HIGH (ref 3.8–10.5)
WBC # FLD AUTO: 12.98 K/UL — HIGH (ref 3.8–10.5)
WBC # FLD AUTO: 13.08 K/UL — HIGH (ref 3.8–10.5)

## 2024-10-14 PROCEDURE — 76937 US GUIDE VASCULAR ACCESS: CPT | Mod: 26

## 2024-10-14 PROCEDURE — 99223 1ST HOSP IP/OBS HIGH 75: CPT

## 2024-10-14 PROCEDURE — 93306 TTE W/DOPPLER COMPLETE: CPT | Mod: 26

## 2024-10-14 PROCEDURE — 99223 1ST HOSP IP/OBS HIGH 75: CPT | Mod: GC

## 2024-10-14 PROCEDURE — 93280 PM DEVICE PROGR EVAL DUAL: CPT | Mod: 26

## 2024-10-14 PROCEDURE — 36000 PLACE NEEDLE IN VEIN: CPT

## 2024-10-14 PROCEDURE — 93308 TTE F-UP OR LMTD: CPT | Mod: 26

## 2024-10-14 PROCEDURE — 71275 CT ANGIOGRAPHY CHEST: CPT | Mod: 26,MC

## 2024-10-14 RX ORDER — HYDROMORPHONE HCL/0.9% NACL/PF 6 MG/30 ML
0.2 PATIENT CONTROLLED ANALGESIA SYRINGE INTRAVENOUS ONCE
Refills: 0 | Status: DISCONTINUED | OUTPATIENT
Start: 2024-10-14 | End: 2024-10-14

## 2024-10-14 RX ORDER — MAGNESIUM, ALUMINUM HYDROXIDE 200-200 MG
30 TABLET,CHEWABLE ORAL EVERY 4 HOURS
Refills: 0 | Status: DISCONTINUED | OUTPATIENT
Start: 2024-10-14 | End: 2024-10-30

## 2024-10-14 RX ORDER — IPRATROPIUM BROMIDE 0.5 MG/2.5ML
1 SOLUTION RESPIRATORY (INHALATION) EVERY 6 HOURS
Refills: 0 | Status: DISCONTINUED | OUTPATIENT
Start: 2024-10-14 | End: 2024-10-14

## 2024-10-14 RX ORDER — IPRATROPIUM BROMIDE 0.5 MG/2.5ML
1 SOLUTION RESPIRATORY (INHALATION)
Refills: 0 | DISCHARGE

## 2024-10-14 RX ORDER — ACETAMINOPHEN 500 MG
650 TABLET ORAL EVERY 6 HOURS
Refills: 0 | Status: DISCONTINUED | OUTPATIENT
Start: 2024-10-14 | End: 2024-10-30

## 2024-10-14 RX ORDER — HYDROMORPHONE HCL/0.9% NACL/PF 6 MG/30 ML
1 PATIENT CONTROLLED ANALGESIA SYRINGE INTRAVENOUS ONCE
Refills: 0 | Status: DISCONTINUED | OUTPATIENT
Start: 2024-10-14 | End: 2024-10-14

## 2024-10-14 RX ORDER — TORSEMIDE 100 MG/1
20 TABLET ORAL DAILY
Refills: 0 | Status: DISCONTINUED | OUTPATIENT
Start: 2024-10-14 | End: 2024-10-14

## 2024-10-14 RX ORDER — IPRATROPIUM BROMIDE AND ALBUTEROL SULFATE .5; 2.5 MG/3ML; MG/3ML
3 SOLUTION RESPIRATORY (INHALATION) EVERY 6 HOURS
Refills: 0 | Status: DISCONTINUED | OUTPATIENT
Start: 2024-10-14 | End: 2024-10-14

## 2024-10-14 RX ORDER — INFLUENZ VIR VAC TV P-SURF2003 15MCG/.5ML
0.5 SYRINGE (ML) INTRAMUSCULAR ONCE
Refills: 0 | Status: DISCONTINUED | OUTPATIENT
Start: 2024-10-14 | End: 2024-10-25

## 2024-10-14 RX ORDER — SPIRONOLACTONE 100 MG
50 TABLET ORAL
Refills: 0 | Status: DISCONTINUED | OUTPATIENT
Start: 2024-10-14 | End: 2024-10-14

## 2024-10-14 RX ORDER — IPRATROPIUM BROMIDE 0.5 MG/2.5ML
500 SOLUTION RESPIRATORY (INHALATION) EVERY 6 HOURS
Refills: 0 | Status: DISCONTINUED | OUTPATIENT
Start: 2024-10-14 | End: 2024-10-30

## 2024-10-14 RX ORDER — SILDENAFIL CITRATE 100 MG
20 TABLET ORAL
Refills: 0 | Status: DISCONTINUED | OUTPATIENT
Start: 2024-10-14 | End: 2024-10-30

## 2024-10-14 RX ORDER — MORPHINE SULFATE 30 MG/1
4 TABLET, EXTENDED RELEASE ORAL ONCE
Refills: 0 | Status: DISCONTINUED | OUTPATIENT
Start: 2024-10-14 | End: 2024-10-14

## 2024-10-14 RX ORDER — OXYCODONE HYDROCHLORIDE 30 MG/1
2.5 TABLET ORAL ONCE
Refills: 0 | Status: DISCONTINUED | OUTPATIENT
Start: 2024-10-14 | End: 2024-10-14

## 2024-10-14 RX ORDER — OXYCODONE HYDROCHLORIDE 30 MG/1
10 TABLET ORAL EVERY 12 HOURS
Refills: 0 | Status: DISCONTINUED | OUTPATIENT
Start: 2024-10-14 | End: 2024-10-14

## 2024-10-14 RX ORDER — RIVAROXABAN 20 MG/1
20 TABLET, FILM COATED ORAL
Refills: 0 | Status: DISCONTINUED | OUTPATIENT
Start: 2024-10-14 | End: 2024-10-16

## 2024-10-14 RX ORDER — MONTELUKAST SODIUM 10 MG/1
10 TABLET, FILM COATED ORAL DAILY
Refills: 0 | Status: DISCONTINUED | OUTPATIENT
Start: 2024-10-14 | End: 2024-10-30

## 2024-10-14 RX ORDER — PANTOPRAZOLE SODIUM 40 MG/1
40 TABLET, DELAYED RELEASE ORAL
Refills: 0 | Status: DISCONTINUED | OUTPATIENT
Start: 2024-10-14 | End: 2024-10-30

## 2024-10-14 RX ORDER — IPRATROPIUM BROMIDE 0.5 MG/2.5ML
500 SOLUTION RESPIRATORY (INHALATION) ONCE
Refills: 0 | Status: COMPLETED | OUTPATIENT
Start: 2024-10-14 | End: 2024-10-14

## 2024-10-14 RX ORDER — BUDESONIDE 3 MG/1
0.5 CAPSULE ORAL
Refills: 0 | Status: DISCONTINUED | OUTPATIENT
Start: 2024-10-14 | End: 2024-10-30

## 2024-10-14 RX ADMIN — Medication 1 MILLIGRAM(S): at 07:42

## 2024-10-14 RX ADMIN — Medication 1 MILLIGRAM(S): at 06:34

## 2024-10-14 RX ADMIN — Medication 0.2 MILLIGRAM(S): at 23:50

## 2024-10-14 RX ADMIN — Medication 0.2 MILLIGRAM(S): at 23:45

## 2024-10-14 RX ADMIN — IPRATROPIUM BROMIDE 500 MICROGRAM(S): 0.5 SOLUTION RESPIRATORY (INHALATION) at 12:53

## 2024-10-14 RX ADMIN — IPRATROPIUM BROMIDE 500 MICROGRAM(S): 0.5 SOLUTION RESPIRATORY (INHALATION) at 17:24

## 2024-10-14 RX ADMIN — RIVAROXABAN 20 MILLIGRAM(S): 20 TABLET, FILM COATED ORAL at 17:24

## 2024-10-14 RX ADMIN — Medication 20 MILLIGRAM(S): at 17:28

## 2024-10-14 RX ADMIN — TORSEMIDE 20 MILLIGRAM(S): 100 TABLET ORAL at 06:19

## 2024-10-14 RX ADMIN — Medication 1 MILLIGRAM(S): at 22:04

## 2024-10-14 RX ADMIN — Medication 1 MILLIGRAM(S): at 08:03

## 2024-10-14 RX ADMIN — PANTOPRAZOLE SODIUM 40 MILLIGRAM(S): 40 TABLET, DELAYED RELEASE ORAL at 06:19

## 2024-10-14 RX ADMIN — Medication 20 MILLIGRAM(S): at 08:54

## 2024-10-14 RX ADMIN — Medication 20 MILLIGRAM(S): at 22:04

## 2024-10-14 RX ADMIN — Medication 10 MILLIGRAM(S): at 06:19

## 2024-10-14 RX ADMIN — Medication 650 MILLIGRAM(S): at 13:19

## 2024-10-14 RX ADMIN — Medication 1 MILLIGRAM(S): at 22:55

## 2024-10-14 RX ADMIN — Medication 1 MILLIGRAM(S): at 07:15

## 2024-10-14 RX ADMIN — MORPHINE SULFATE 4 MILLIGRAM(S): 30 TABLET, EXTENDED RELEASE ORAL at 01:15

## 2024-10-14 RX ADMIN — BUDESONIDE 0.5 MILLIGRAM(S): 3 CAPSULE ORAL at 17:24

## 2024-10-14 RX ADMIN — IPRATROPIUM BROMIDE 500 MICROGRAM(S): 0.5 SOLUTION RESPIRATORY (INHALATION) at 06:19

## 2024-10-14 RX ADMIN — IPRATROPIUM BROMIDE 500 MICROGRAM(S): 0.5 SOLUTION RESPIRATORY (INHALATION) at 23:30

## 2024-10-14 RX ADMIN — Medication 650 MILLIGRAM(S): at 12:49

## 2024-10-14 NOTE — H&P ADULT - PROBLEM SELECTOR PLAN 1
History of group 1 + 4 pHTN on Sildenafil and Rimodulin     Plan:  - c/w Sildenafil and Rimodulin  - Pulm consult in the morning  - c/w Xarelto 20mg QD

## 2024-10-14 NOTE — H&P ADULT - HISTORY OF PRESENT ILLNESS
44 year old woman with a history of pHTN (group I and IV on Rimodulin and Xarelto, on home 2L O2), c/b R heart failure, history of RA thrombus s/p thrombectomy, history of DVT/PE (2016), multiple abdominal wall abscesses (s/p I&D), asthma, bradycardia s/p dual chamber PPM. Patient presents with 2 days gradual onset palpitations and SOB on exertion, associated with midsternal chest tightness that is non-radiating. ROS additionally positive for epigastric pain. She states she usually had all these symptoms in the past for pHTN and asthma exacerbation. She endorse some baseline cough and sputum production but states it is not worse than usual. She denies fever, chills, dysuria, URI symptoms.    ED: T98.5,  -> 104, BP 120s/70s, 99% 4L. Labs notable for WBC 13.08, BNP 1861 (previously 840 7/2024). CT angio w/ patchy GGO (unchanged from previous).

## 2024-10-14 NOTE — PROGRESS NOTE ADULT - PROBLEM SELECTOR PLAN 1
History of group 1 + 4 pHTN on Sildenafil and Rimodulin     Plan:  - c/w Sildenafil and Rimodulin  - Pulm consult in the morning  - c/w Xarelto 20mg QD -History of group 1 + 4 pHTN on Sildenafil and Rimodulin   -pro-BNP elevated at 1861  Plan:  - c/w Sildenafil and Rimodulin  - Pulm consulted  - c/w Xarelto 20mg QD

## 2024-10-14 NOTE — PROGRESS NOTE ADULT - PROBLEM SELECTOR PLAN 3
History of RV failure iso pHTN, presented with worsening SOUZA, on exam with JVD  - Home regimen: Aldactone 25mg QD, Torsemide 20mg QD    Plan:  - s/p Torsemide 20mg PO + 1mg Bumex (10/14), reassess daily given RHF  - Strict I&O, daily weight  - Will repeat TTE  - EP for device interrogation in the AM given complaints of palpitations - History of GERD, on protonix 40mg at home.   - epigastric tenderness on palpitation   - Liver Enzymes WNL    Plan:  -c/w protonix   - lipase pending  - CT abdomen considered

## 2024-10-14 NOTE — H&P ADULT - ASSESSMENT
44 year old woman with a history of pHTN (group I and IV on Rimodulin and Xarelto, on home 2L O2), c/b R heart failure, history of RA thrombus s/p thrombectomy, history of DVT/PE (2016), multiple abdominal wall abscesses (s/p I&D), asthma, bradycardia s/p dual chamber PPM. She presents with worsening palpitations and SOB on exertion concerning for pHTN exacerbation

## 2024-10-14 NOTE — H&P ADULT - PROBLEM SELECTOR PLAN 3
History of RV failure iso pHTN, presented with worsening SOUZA, on exam with JVD    Plan:  - c/w Spironolactone 50mg QD  - c/w Torsemide 20mg QD, will give addition dose (total 40) today and reassess daily  - HF consult in the AM  - EP for device interrogation in the AM given complaints of palpitations History of RV failure iso pHTN, presented with worsening SOUZA, on exam with JVD  - Home regimen: Aldactone 25mg QD, Torsemide 20mg QD    Plan:  - s/p Torsemide 20mg PO + 1mg Bumex (10/14), reassess daily given RHF  - Strict I&O, daily weight  - Will repeat TTE  - EP for device interrogation in the AM given complaints of palpitations

## 2024-10-14 NOTE — H&P ADULT - NSHPLABSRESULTS_GEN_ALL_CORE
13.3   13.08 )-----------( 358      ( 13 Oct 2024 23:16 )             43.6       10    137  |  105  |  15  ----------------------------<  99  4.0   |  19[L]  |  1.13    Ca    8.3[L]      13 Oct 2024 23:16  Phos  3.1     10-  Mg     1.9     10-13    TPro  6.6  /  Alb  3.7  /  TBili  1.0  /  DBili  x   /  AST  13  /  ALT  10  /  AlkPhos  62  10-              Urinalysis Basic - ( 14 Oct 2024 02:48 )    Color: Yellow / Appearance: Clear / S.023 / pH: x  Gluc: x / Ketone: Negative mg/dL  / Bili: Negative / Urobili: 2.0 mg/dL   Blood: x / Protein: Negative mg/dL / Nitrite: Negative   Leuk Esterase: Negative / RBC: x / WBC x   Sq Epi: x / Non Sq Epi: x / Bacteria: x        PT/INR - ( 13 Oct 2024 23:16 )   PT: 27.5 sec;   INR: 2.41 ratio         PTT - ( 13 Oct 2024 23:16 )  PTT:53.4 sec    Lactate Trend            CAPILLARY BLOOD GLUCOSE Labs, CXR/CT report and EKG tracing personally reviewed    13.3   13.08 )-----------( 358      ( 13 Oct 2024 23:16 )             43.6       10    137  |  105  |  15  ----------------------------<  99  4.0   |  19[L]  |  1.13    Ca    8.3[L]      13 Oct 2024 23:16  Phos  3.1     10-  Mg     1.9     10    TPro  6.6  /  Alb  3.7  /  TBili  1.0  /  DBili  x   /  AST  13  /  ALT  10  /  AlkPhos  62  10-              Urinalysis Basic - ( 14 Oct 2024 02:48 )    Color: Yellow / Appearance: Clear / S.023 / pH: x  Gluc: x / Ketone: Negative mg/dL  / Bili: Negative / Urobili: 2.0 mg/dL   Blood: x / Protein: Negative mg/dL / Nitrite: Negative   Leuk Esterase: Negative / RBC: x / WBC x   Sq Epi: x / Non Sq Epi: x / Bacteria: x        PT/INR - ( 13 Oct 2024 23:16 )   PT: 27.5 sec;   INR: 2.41 ratio         PTT - ( 13 Oct 2024 23:16 )  PTT:53.4 sec    Lactate Trend            CAPILLARY BLOOD GLUCOSE

## 2024-10-14 NOTE — PROGRESS NOTE ADULT - PROBLEM SELECTOR PLAN 2
History of asthma, on Atrovent PRN outpatient, was on Prednisone 10mg QD for pHTN    Plan:  - Will give Atrovent nebulizer q6h, patient refused albuterol stating it makes her heart race  - Will c/w Prednisone 10mg for now, defer whether or not to increase to pulm -History of RV failure iso pHTN, presented with worsening SOUZA, on exam with JVD  - pro-BNP elevated at 1861  - Home regimen: Aldactone 25mg QD, Torsemide 20mg QD  - s/p Torsemide 20mg PO + 1mg Bumex (10/14) in ED.     Plan:  - continue Torsemide 20mg PO  - start Bumex 1mg BID.    - Strict I&O, daily weight  - Will repeat TTE

## 2024-10-14 NOTE — PATIENT PROFILE ADULT - FALL HARM RISK - HARM RISK INTERVENTIONS

## 2024-10-14 NOTE — PROGRESS NOTE ADULT - PROBLEM SELECTOR PLAN 4
WBC 13 on admission  - CT (10/13): patchy GGO unchanged from prior    Plan:  - No localizing symptoms besides SOB which may be attributable to pHTN; No fever/cough  - CT with unchanged patchy GGO  - Will monitor off abx for now, low threshold to start DC instructions -WBC 13 on admission  - CT (10/13): patchy GGO unchanged from prior  - afebrile, tachycardic   - UA negative  - CT with unchanged patchy GGO  - No localizing symptoms besides SOB which may be attributable to pHTN; No fever/cough  Plan:  - blood cultures pending.   - RSV panel pending   - Will monitor off abx for now, low threshold to start

## 2024-10-14 NOTE — PROCEDURE NOTE - ADDITIONAL PROCEDURE DETAILS
Indication: Palpitations with ambulation  Underlying/presenting rhythm: NSR 80s with 1:1 conduction  Pt is not pacer dependent.  AP and  <1%.  Normal device function and lead measurements.   No re-programming changes made.  No recent episodes noting in device arrhythmia log. No new events since last remote transmission 9/17/2024. Pt with known history of paroxysmal SVT, most recent episode 7/2024, no episodes since then.

## 2024-10-14 NOTE — PROVIDER CONTACT NOTE (SEPSIS SCREENING) - BACKGROUND:
Pt admitted for palpitations and SOB on exertion. Pt has a history of pulmonary hypertension and asthma. Pt notes similar symptoms from past experiences with pulmonary fibrosis

## 2024-10-14 NOTE — PROGRESS NOTE ADULT - ATTENDING COMMENTS
44F PMH pHTN (group I and IV on Rimodulin and Xarelto, on home 2L O2), c/b R heart failure, history of RA thrombus s/p thrombectomy, history of DVT/PE (2016), multiple abdominal wall abscesses (s/p I&D), asthma, bradycardia s/p dual chamber PPM presenting with SOUZA, palpitations, and epigastric pain.     #pHTN exacerbation  #Chronic hypozemic respiraotry failure  - Elevated pBNP and pulmonary edema on CXR  - Home torsemide 20 mg qd on hold. S/p Bumex 1 mg IV x 2 with good UOP.  - Per pulm: continue Bumex 0.5 mg IV BID which is equivalent to home diuresis.   - Continue Revatio and Remodulin increase to 50  - TTE pending  - Continue O2 therapy with NC, baseline 2 L  #Acute on chronic leukocytosis  - WBC slightly up from basleine steroid-induced leukocytosis  - Obtain full RVP, follow blood cx  - Monitor off ABX, low threshold to initiate  #Epigastric pain  #hx GERD  - Start protonix, obtain lipase  - Able to eat entire lunch without complaints  #Bradycardia s/p PPM  #Palpitations  - EP consult for PPM interrogation  - Telemetry monitoring 44F PMH pHTN (group I and IV on Rimodulin and Xarelto, on home 2L O2), c/b R heart failure, history of RA thrombus s/p thrombectomy, history of DVT/PE (2016), multiple abdominal wall abscesses (s/p I&D), asthma, bradycardia s/p dual chamber PPM presenting with SOUZA, palpitations, and epigastric pain.     #pHTN exacerbation  #Chronic hypozemic respiraotry failure  - Elevated pBNP and pulmonary edema on CXR  - Home torsemide 20 mg qd on hold. Continue Bumex 1mg IV BID.  - Continue Revatio and Remodulin increase to 50  - TTE pending  - Continue O2 therapy with NC, baseline 2 L  #Acute on chronic leukocytosis  - WBC slightly up from basleine steroid-induced leukocytosis  - Obtain full RVP, follow blood cx  - Monitor off ABX, low threshold to initiate  #Epigastric pain  #hx GERD  - Start protonix, obtain lipase  - Able to eat entire lunch without complaints  #Bradycardia s/p PPM  #Palpitations  - EP consult for PPM interrogation  - Telemetry monitoring

## 2024-10-14 NOTE — PATIENT PROFILE ADULT - NSPROIMPLANTSMEDDEV_GEN_A_NUR
Systems   HENT: Positive for congestion. Respiratory: Positive for cough. All other systems reviewed and are negative. Objective:   Physical Exam   Constitutional: He is well-developed, well-nourished, and in no distress. HENT:   Head: Normocephalic and atraumatic. Right Ear: External ear normal.   Left Ear: External ear normal.   Nose: Nose normal.   Mouth/Throat: Oropharynx is clear and moist.   Eyes: Conjunctivae and EOM are normal. Pupils are equal, round, and reactive to light. Right eye exhibits no discharge. Left eye exhibits no discharge. No scleral icterus. Neck: Normal range of motion. Neck supple. No JVD present. No tracheal deviation present. No thyromegaly present. Cardiovascular: Normal rate, regular rhythm and normal heart sounds. Exam reveals no gallop and no friction rub. No murmur heard. Pulmonary/Chest: Effort normal and breath sounds normal. No stridor. No respiratory distress. He has no wheezes. He has no rales. He exhibits no tenderness. Abdominal: Soft. Bowel sounds are normal. There is no tenderness. Musculoskeletal: Normal range of motion. He exhibits no edema or tenderness. Lymphadenopathy:     He has no cervical adenopathy. Neurological: He is alert. He has normal reflexes. No cranial nerve deficit. He exhibits normal muscle tone. Gait normal.   Skin: Skin is warm and dry. No rash noted. Psychiatric: Mood, memory, affect and judgment normal.   Nursing note and vitals reviewed. Assessment/Plan     1. The 10-year ASCVD risk score (Kaushik Hauser, et al., 2013) is: 3.1%    Values used to calculate the score:      Age: 54 years      Sex: Male      Is Non- : No      Diabetic: No      Tobacco smoker: No      Systolic Blood Pressure: 94 mmHg      Is BP treated: No      HDL Cholesterol: 50 mg/dL      Total Cholesterol: 189 mg/dL     2. Vit D : deficiency start replacement  3. Vit b12 def due for replacement orally  4.  Chronic diarrhea currently controlled  65, due for pneumovac     Return 1 year    Anival Chacon MD Pacemaker

## 2024-10-14 NOTE — PATIENT PROFILE ADULT - FUNCTIONAL ASSESSMENT - BASIC MOBILITY 2.
TRANSFER - IN REPORT:    Verbal report received from L. [id number] [facility] RN(name) on [facility] [employer].  being received from [employer]) for routine post - op      Report consisted of patients Situation, Background, Assessment and   Recommendations(SBAR). Information from the following report(s) SBAR, Procedure Summary, and MAR was reviewed with the receiving nurse. Opportunity for questions and clarification was provided. Assessment completed upon patients arrival to unit and care assumed. 3 = A little assistance

## 2024-10-14 NOTE — PROGRESS NOTE ADULT - PROBLEM SELECTOR PLAN 6
History of asthma, on Atrovent PRN outpatient, was on Prednisone 10mg QD for pHTN    Plan:  - Pulm consulted  - Will give Atrovent nebulizer q6h, patient refused albuterol stating it makes her heart race  - Will c/w Prednisone 10mg for now, defer whether or not to increase to pulm

## 2024-10-14 NOTE — H&P ADULT - ATTENDING COMMENTS
palpitation with increase SOUZA and chest tightness similar to prior pulm HTN exacerbation, chronic hypoxic respiratory failure on 2L O2  - will continue Prednisone 10mg, and current regimen for pulm HTN  - will check peak flow to evaluate her asthma  - pulm consult for further management of pulm HTN  - will diurese and check TTE with pBNP increase 800 to 1800  - continue AC   - chronic leukocytosis on steroid without focal signs of infection, will monitor off antibiotics  - discussed with Dr. Willi Tolentino palpitation with increase SOUZA and chest tightness similar to prior pulm HTN exacerbation, chronic hypoxic respiratory failure on 2L O2  - will continue Prednisone 10mg, and current regimen for pulm HTN  - will check peak flow to evaluate her asthma  - pulm consult for further management of pulm HTN  - will diurese and check TTE with pBNP increase 800 (july 2024) to 1800 today  - continue AC   - chronic leukocytosis on steroid without focal signs of infection, will monitor off antibiotics  - discussed with Dr. Willi Tolentino

## 2024-10-14 NOTE — H&P ADULT - NSHPREVIEWOFSYSTEMS_GEN_ALL_CORE
REVIEW OF SYSTEMS:  Constitutional: [ ] fevers [ ] chills [ ] weight loss [ ] weight gain  HEENT: [ ] headache [ ] dizziness [ ] vertigo [ ] blurry vision[ ] double vision [ ] rhinorrhea [ ] nasal congestion [ ] sore throat  CV: [ x] chest pain [ ] orthopnea [ ] palpitations  Resp: [x ] cough [ x] shortness of breath [x ] dyspnea [ ] wheezing [ ] sputum [ ] hemoptysis  GI: [ ] nausea [ ] vomiting [ ] diarrhea [ ] constipation [ ] abd pain [ ] dysphagia [ ] hemetemesis [ ] melena [ ] hematochezia  : [ ] dysuria [ ] nocturia [ ] hematuria [ ] urinary frequency [ ] urinary urgency  Musculoskeletal: [ ] back pain [ ] myalgias [ ] arthralgias  Skin: [ ] rash [ ] itch  Neurological: [ ] headache [ ] dizziness [ ] syncope [ ] weakness [ ] numbness  Hematologic/Lymphatic: [ ] anemia [ ] bleeding problem  [x ] Negative except noted in HPI/subjective  [ ] Unable to assess ROS because ________

## 2024-10-14 NOTE — H&P ADULT - PROBLEM SELECTOR PLAN 2
History of asthma, on Atrovent PRN outpatient, was on Prednisone 10mg QD for pHTN    Plan:  - Will give Atrovent nebulizer q6h, patient refused albuterol stating it makes her heart race  - Will increase prednisone to 40mg QD for now History of asthma, on Atrovent PRN outpatient, was on Prednisone 10mg QD for pHTN    Plan:  - Will give Atrovent nebulizer q6h, patient refused albuterol stating it makes her heart race  - Will c/w Prednisone 10mg for now, defer whether or not to increase to pulm

## 2024-10-14 NOTE — PROGRESS NOTE ADULT - PROBLEM SELECTOR PLAN 5
Diet: DASH  DVT ppx: Xarelto  Disposition: Pending course  Code: Full code - Pt on 2 L O2 at home   - currently 95 spO2 on 3L nasal canula.     Plan:   - continue on O2   - Monitor RR and O2 saturation.

## 2024-10-14 NOTE — H&P ADULT - NSHPSOCIALHISTORY_GEN_ALL_CORE
Never smoker  Former social drinker  No illicit drug use Never smoker  Former social drinker  No illicit drug use  , lives with  and son  used to work as [employer]

## 2024-10-14 NOTE — H&P ADULT - NSHPPHYSICALEXAM_GEN_ALL_CORE
GENERAL: AAOx3, NAD, lying in bed comfortably  HEAD:  Atraumatic, normocephalic  NECK: Supple, JVD +  HEART: Tachycardic, regular rhythm, S1 S2, faint systolic murmur over LLSB.   LUNGS: Mildly increased WOB, inspiratory wheeze bilateral, decrease air entry, no crackles or rhonchi  ABDOMEN: Soft, TTP in epigastrium, non-distended, no rebound or guarding  EXTREMITIES: Extremities warm, no edema  NEURO: Grossly nonfocal GENERAL: AAOx3, NAD but increase work of breathing with minimal exertion  HEAD:  Atraumatic, normocephalic  NECK: Supple, JVD +  HEART: Tachycardic, regular rhythm, S1 S2, faint systolic murmur over LLSB.   LUNGS: Mildly increased WOB, inspiratory wheeze bilateral, decrease air entry, no crackles or rhonchi  ABDOMEN: Soft, TTP in epigastrium, non-distended, no rebound or guarding  EXTREMITIES: Extremities warm, no edema  NEURO: Grossly nonfocal

## 2024-10-14 NOTE — H&P ADULT - PROBLEM SELECTOR PLAN 4
Diet: DASH  DVT ppx: Xarelto  Disposition: Pending course  Code: Full code WBC 13 on admission  - CT (10/13): patchy GGO unchanged from prior    Plan:  - No localizing symptoms besides SOB which may be attributable to pHTN; No fever/cough  - CT with unchanged patchy GGO  - Will monitor off abx for now, low threshold to start

## 2024-10-14 NOTE — CONSULT NOTE ADULT - ATTENDING COMMENTS
45 yo F w/ PMHx PHTN (group I and IV) currently on remodulin and xarelto, chronic hypoxemic respiratory failure on 2L home O2, RA thrombus s/p thrombectomy, Asthma (Pred 10mg), prior AVRNT s/p ablation, bradycardia s/p dual chamber PPM, and hx of abdominal wall abscesses presenting for increased dyspnea and palpitations with HR into 150s. CTPA negative for PE. ED POCUS with IVC >2cm, resp variation noted, severe RV dilation    Recommend:  - Continue Remodulin, please INCREASE to 50ng from 49ng  - Continue Sildenafil 20mg TID  - please continue with Ipratropium neb q6h, previously refused dupixent  - Start Budesonide 0.5mg BID  - recommend continued diuresis as tolerated, s/p Bumex 1mg x 2, needs daily standing weights, strict Is/Os  - supplemental O2 as needed for sat >90  - c/w chronic Prednisone but would decrease from 15mg to 10mg  - obtain official 2D Echo  - EP consult for PPM interrogation  - please check FULL RVP and Procalcitonin  - monitor off Abx for now    Discussed with Dr. Yoon

## 2024-10-14 NOTE — PROGRESS NOTE ADULT - SUBJECTIVE AND OBJECTIVE BOX
Internal Medicine Progress Note  Elisabet Lantigua, PGY-1        OVERNIGHT EVENTS/INTERVAL HPI:    OBJECTIVE:  Vital Signs Last 24 Hrs  T(C): 36.8 (14 Oct 2024 05:13), Max: 36.9 (13 Oct 2024 22:18)  T(F): 98.2 (14 Oct 2024 05:13), Max: 98.5 (14 Oct 2024 04:49)  HR: 110 (14 Oct 2024 05:13) (104 - 120)  BP: 120/82 (14 Oct 2024 05:13) (120/82 - 128/77)  BP(mean): --  RR: 18 (14 Oct 2024 05:13) (16 - 18)  SpO2: 95% (14 Oct 2024 05:13) (95% - 99%)    Parameters below as of 14 Oct 2024 05:13  Patient On (Oxygen Delivery Method): nasal cannula  O2 Flow (L/min): 3    I&O's Detail    Physical Exam:  GENERAL: Awake, alert and interactive, no acute distress, appears comfortable  NEURO: A&Ox4, no focal deficits, 5/5 strength in all ext, reflexes 2+ throughout, CN 2-12 intact  HEENT: Normocephalic, atraumatic, no conjunctivitis or scleral icterus, oral mucosa moist, no oral lesions noted  NECK: Supple, no LAD, no JVD, thyroid not palpable  CARDIAC: Regular rate and rhythm, +S1/S2, no murmurs/rubs/gallops  PULM: Breathing comfortably on RA, clear to auscultation bilaterally, no wheezes/rales/rhonchi  ABDOMEN: Soft, nontender, nondistended, +bs, no hepatosplenomegaly, no rebound tenderness or fluid wave, no CVA tenderness  : Deferred  MSK: Range of motion grossly intact, no back tenderness  SKIN: Warm and dry, no rashes, lesions  VASC: Cap refil < 2 sec, 2+ peripheral pulses, no edema, no LE tenderness  Psych: Appropriate affect    Medications:  MEDICATIONS  (STANDING):  ipratropium    for Nebulization 500 MICROGram(s) Nebulizer every 6 hours  pantoprazole    Tablet 40 milliGRAM(s) Oral before breakfast  predniSONE   Tablet 10 milliGRAM(s) Oral daily  rivaroxaban 20 milliGRAM(s) Oral with dinner  sildenafil (REVATIO) 20 milliGRAM(s) Oral <User Schedule>  Treprostinil  SubQ continuous infusion 48 NANOGram(s)/kG/Min   (0.052 mL/Hr) IV Continuous <Continuous>    MEDICATIONS  (PRN):      Labs:                        13.3   13.08 )-----------( 358      ( 13 Oct 2024 23:16 )             43.6     10-13    137  |  105  |  15  ----------------------------<  99  4.0   |  19[L]  |  1.13    Ca    8.3[L]      13 Oct 2024 23:16  Phos  3.1     10-13  Mg     1.9     10-    TPro  6.6  /  Alb  3.7  /  TBili  1.0  /  DBili  x   /  AST  13  /  ALT  10  /  AlkPhos  62  10-13    PT/INR - ( 13 Oct 2024 23:16 )   PT: 27.5 sec;   INR: 2.41 ratio         PTT - ( 13 Oct 2024 23:16 )  PTT:53.4 sec    Urinalysis Basic - ( 14 Oct 2024 02:48 )    Color: Yellow / Appearance: Clear / S.023 / pH: x  Gluc: x / Ketone: Negative mg/dL  / Bili: Negative / Urobili: 2.0 mg/dL   Blood: x / Protein: Negative mg/dL / Nitrite: Negative   Leuk Esterase: Negative / RBC: x / WBC x   Sq Epi: x / Non Sq Epi: x / Bacteria: x          Radiology: Reviewed Internal Medicine Progress Note  Elisabet Lantigua, PGY-1        OVERNIGHT EVENTS/INTERVAL HPI:  Pt seen at bedside in the ED. No overnight events. Pt states that she came to the hospital because she was experiencing SOB, palpitations, increased heart rate up to 150, and epigastric pain. Pt states that symptoms started to progressively get worse starting on Saturday (10/12). Pt states that her palpitation and SOB were similar to past pulmonary hypertension exacerbations. She states she has a baseline cough that has not changed. However, she states that the epigastric plan is not normal. She associated the epigastric pain with her heart palpitations She describes it as sharp and non-radiating. Pt reports a history of acid reflex, which she states she takes a PPI. She states that her current epigastric pain is not similar to the pain that she experienced with her acid reflex. She denies fever, chills, dysuria, URI symptoms.    OBJECTIVE:  Vital Signs Last 24 Hrs  T(C): 36.8 (14 Oct 2024 05:13), Max: 36.9 (13 Oct 2024 22:18)  T(F): 98.2 (14 Oct 2024 05:13), Max: 98.5 (14 Oct 2024 04:49)  HR: 110 (14 Oct 2024 05:13) (104 - 120)  BP: 120/82 (14 Oct 2024 05:13) (120/82 - 128/77)  BP(mean): --  RR: 18 (14 Oct 2024 05:13) (16 - 18)  SpO2: 95% (14 Oct 2024 05:13) (95% - 99%)    Parameters below as of 14 Oct 2024 05:13  Patient On (Oxygen Delivery Method): nasal cannula  O2 Flow (L/min): 3    I&O's Detail    Physical Exam:  GENERAL: Awake, alert and interactive, increased work of breathing, appears comfortable  NEURO: A&Ox4, no focal deficits, 5/5 strength in all ext, reflexes 2+ throughout, CN 2-12 intact  HEENT: Normocephalic, atraumatic, no conjunctivitis or scleral icterus, oral mucosa moist, no oral lesions noted  NECK: Supple, no LAD, no JVD, thyroid not palpable  CARDIAC: Tachycardic rate, Regular rhythm, +S1/S2, no murmurs/rubs/gallops  PULM: +Increased work of breath, clear to auscultation bilaterally, +Expiratory wheeze bilaterally, No rales/rhonchi  ABDOMEN: Soft, +epigastric tenderness to palpation, nondistended, +bs, no hepatosplenomegaly, no rebound tenderness or fluid wave, no CVA tenderness  : Deferred  MSK: Range of motion grossly intact, no back tenderness  SKIN: Warm and dry, no rashes, lesions  VASC: Cap refil < 2 sec, 2+ peripheral pulses, no edema, no LE tenderness  Psych: Appropriate affect    Medications:  MEDICATIONS  (STANDING):  ipratropium    for Nebulization 500 MICROGram(s) Nebulizer every 6 hours  pantoprazole    Tablet 40 milliGRAM(s) Oral before breakfast  predniSONE   Tablet 10 milliGRAM(s) Oral daily  rivaroxaban 20 milliGRAM(s) Oral with dinner  sildenafil (REVATIO) 20 milliGRAM(s) Oral <User Schedule>  Treprostinil  SubQ continuous infusion 48 NANOGram(s)/kG/Min   (0.052 mL/Hr) IV Continuous <Continuous>    MEDICATIONS  (PRN):      Labs:                        13.3   13.08 )-----------( 358      ( 13 Oct 2024 23:16 )             43.6     10-13    137  |  105  |  15  ----------------------------<  99  4.0   |  19[L]  |  1.13    Ca    8.3[L]      13 Oct 2024 23:16  Phos  3.1     10-13  Mg     1.9     10-    TPro  6.6  /  Alb  3.7  /  TBili  1.0  /  DBili  x   /  AST  13  /  ALT  10  /  AlkPhos  62  10-13    PT/INR - ( 13 Oct 2024 23:16 )   PT: 27.5 sec;   INR: 2.41 ratio    PTT - ( 13 Oct 2024 23:16 )  PTT:53.4 sec    Pro-Brain Natriuretic Peptide: 1861 pg/mL (10.13.24 @ 23:16)     Urinalysis Basic - ( 14 Oct 2024 02:48 )    Color: Yellow / Appearance: Clear / S.023 / pH: x  Gluc: x / Ketone: Negative mg/dL  / Bili: Negative / Urobili: 2.0 mg/dL   Blood: x / Protein: Negative mg/dL / Nitrite: Negative   Leuk Esterase: Negative / RBC: x / WBC x   Sq Epi: x / Non Sq Epi: x / Bacteria: x          Radiology:   < from: CT Angio Chest PE Protocol w/ IV Cont (10.14.24 @ 01:08) >  Mixing artifact in the main pulmonary arteries degrading evaluation. No   evidence of acute pulmonary embolism within the confines of this exam.    Patchy groundglass opacities in both lungs similar to that seen on prior   exam. Nonspecific finding could represent pulmonary edema or may be   infectious/inflammatory.    Dilated pulmonary arteries in keeping with patient's known pulmonary   hypertension.    < end of copied text >    < from: POCUS ED TTE 2D F/U, Limited w/o Cont. (10.14.24 @ 04:28) >  IMPRESSION:  Trace pericardial effusion with no sonographic signs of tamponade.  Severe right ventricular dilation.    < end of copied text >   Internal Medicine Progress Note    Pt is a 44 year old woman with a history of pHTN (group I and IV on Rimodulin and Xarelto, on home 2L O2), c/b R heart failure, history of RA thrombus s/p thrombectomy, history of DVT/PE (2016), multiple abdominal wall abscesses (s/p I&D), asthma, bradycardia s/p dual chamber PPM. She presents with worsening palpitations and SOB on exertion.        OVERNIGHT EVENTS/INTERVAL HPI:  Pt seen at bedside in the ED. No overnight events. Pt states she is fatigued and tired. Pt states that she came to the hospital because she was experiencing SOB, palpitations, increased heart rate up to 150, and epigastric pain. Pt states that symptoms started to progressively get worse starting on Saturday (10/12). Pt states that her palpitation and SOB were similar to past pulmonary hypertension exacerbations. She states she has a baseline cough that has not changed. However, she states that the epigastric plan is not normal. She associates the epigastric pain with her heart palpitations. She describes it as sharp and non-radiating. Dilaudid has helped with the pain. Pt reports a history of acid reflex, which she states she takes a PPI. She states that her current epigastric pain is not similar to the pain that she experienced with her acid reflex. She denies fever, chills, dysuria, URI symptoms.    OBJECTIVE:  Vital Signs Last 24 Hrs  T(C): 36.8 (14 Oct 2024 05:13), Max: 36.9 (13 Oct 2024 22:18)  T(F): 98.2 (14 Oct 2024 05:13), Max: 98.5 (14 Oct 2024 04:49)  HR: 110 (14 Oct 2024 05:13) (104 - 120)  BP: 120/82 (14 Oct 2024 05:13) (120/82 - 128/77)  BP(mean): --  RR: 18 (14 Oct 2024 05:13) (16 - 18)  SpO2: 95% (14 Oct 2024 05:13) (95% - 99%)    Parameters below as of 14 Oct 2024 05:13  Patient On (Oxygen Delivery Method): nasal cannula  O2 Flow (L/min): 3    I&O's Detail    Physical Exam:  GENERAL: Awake, alert and interactive, increased work of breathing, appears comfortable  NEURO: A&Ox4, no focal deficits, 5/5 strength in all ext, reflexes 2+ throughout, CN 2-12 intact  HEENT: Normocephalic, atraumatic, no conjunctivitis or scleral icterus, oral mucosa moist, no oral lesions noted  NECK: Supple, no LAD, no JVD, thyroid not palpable  CARDIAC: Tachycardic rate, Regular rhythm, +S1/S2, no murmurs/rubs/gallops  PULM: +Increased work of breath, clear to auscultation bilaterally, +Expiratory wheeze bilaterally, No rales/rhonchi  ABDOMEN: Soft, +epigastric tenderness to palpation, nondistended, +bs, no hepatosplenomegaly, no rebound tenderness or fluid wave, no CVA tenderness  : Deferred  MSK: Range of motion grossly intact, no back tenderness  SKIN: Warm and dry, no rashes, lesions  VASC: Cap refil < 2 sec, 2+ peripheral pulses, no edema, no LE tenderness  Psych: Appropriate affect    Medications:  MEDICATIONS  (STANDING):  ipratropium    for Nebulization 500 MICROGram(s) Nebulizer every 6 hours  pantoprazole    Tablet 40 milliGRAM(s) Oral before breakfast  predniSONE   Tablet 10 milliGRAM(s) Oral daily  rivaroxaban 20 milliGRAM(s) Oral with dinner  sildenafil (REVATIO) 20 milliGRAM(s) Oral <User Schedule>  Treprostinil  SubQ continuous infusion 48 NANOGram(s)/kG/Min   (0.052 mL/Hr) IV Continuous <Continuous>    MEDICATIONS  (PRN):      Labs:                        13.3   13.08 )-----------( 358      ( 13 Oct 2024 23:16 )             43.6     10-    137  |  105  |  15  ----------------------------<  99  4.0   |  19[L]  |  1.13    Ca    8.3[L]      13 Oct 2024 23:16  Phos  3.1     10-13  Mg     1.9     10-    TPro  6.6  /  Alb  3.7  /  TBili  1.0  /  DBili  x   /  AST  13  /  ALT  10  /  AlkPhos  62  10-13    PT/INR - ( 13 Oct 2024 23:16 )   PT: 27.5 sec;   INR: 2.41 ratio    PTT - ( 13 Oct 2024 23:16 )  PTT:53.4 sec    Pro-Brain Natriuretic Peptide: 1861 pg/mL (10.13.24 @ 23:16)     Urinalysis Basic - ( 14 Oct 2024 02:48 )    Color: Yellow / Appearance: Clear / S.023 / pH: x  Gluc: x / Ketone: Negative mg/dL  / Bili: Negative / Urobili: 2.0 mg/dL   Blood: x / Protein: Negative mg/dL / Nitrite: Negative   Leuk Esterase: Negative / RBC: x / WBC x   Sq Epi: x / Non Sq Epi: x / Bacteria: x          Radiology:   < from: CT Angio Chest PE Protocol w/ IV Cont (10.14.24 @ 01:08) >  Mixing artifact in the main pulmonary arteries degrading evaluation. No   evidence of acute pulmonary embolism within the confines of this exam.    Patchy groundglass opacities in both lungs similar to that seen on prior   exam. Nonspecific finding could represent pulmonary edema or may be   infectious/inflammatory.    Dilated pulmonary arteries in keeping with patient's known pulmonary   hypertension.    < end of copied text >    < from: POCUS ED TTE 2D F/U, Limited w/o Cont. (10.14.24 @ 04:28) >  IMPRESSION:  Trace pericardial effusion with no sonographic signs of tamponade.  Severe right ventricular dilation.    < end of copied text >

## 2024-10-14 NOTE — PROGRESS NOTE ADULT - ASSESSMENT
44 year old woman with a history of pHTN (group I and IV on Rimodulin and Xarelto, on home 2L O2), c/b R heart failure, history of RA thrombus s/p thrombectomy, history of DVT/PE (2016), multiple abdominal wall abscesses (s/p I&D), asthma, bradycardia s/p dual chamber PPM. She presents with worsening palpitations and SOB on exertion concerning for pHTN exacerbation 44 year old woman with a history of pHTN (group I and IV on Rimodulin and Xarelto, on home 2L O2), c/b R heart failure, history of RA thrombus s/p thrombectomy, history of DVT/PE (2016), multiple abdominal wall abscesses (s/p I&D), asthma, bradycardia s/p dual chamber PPM. She presents with worsening palpitations and SOB on exertion. Labs show elevated pro-BNP at 1861, which increased from 840 in July 2024. Pt chest unchanged from July 2024. Concerning for pHTN exacerbation complicated by right heart failure.  44 year old woman with a history of pHTN (group I and IV on Rimodulin and Xarelto, on home 2L O2), c/b R heart failure, history of RA thrombus s/p thrombectomy, history of DVT/PE (2016), multiple abdominal wall abscesses (s/p I&D), asthma, bradycardia s/p dual chamber PPM. She presents with worsening palpitations and SOB on exertion. Labs show elevated pro-BNP at 1861, which increased from 840 in July 2024. Pt chest ct unchanged from July 2024. Concerning for pHTN exacerbation complicated by right heart failure.

## 2024-10-14 NOTE — CONSULT NOTE ADULT - ASSESSMENT
44F with PMHx of PAH w/Remodulin pump, R-sided heart failure (on 2L O2 at baseline), bradycardia (s/p pacemaker), PE/DVT (on Xarelto), asthma, multiple recurrent abdominal wall abscesses presents with worsening SOB. Symptoms appear to start around or closely followed when her right upper molar became symptomatic with increasing pain and swelling. This is an area of her teeth where a tooth chipped and now remains a piece of her original tooth. No recent trauma to this area. Her symptoms of fatigue, sweats, palpitation appear infectious in origin and may be 2/2 infected tooth. Her cough is chronic with some phlegm production and her SOB may be associated with her pulmonary hypertension for which she follows outpatient with Dr. Yoon. Patient was titrated on her Remodulin in the past with side effect of angioedema but no swelling at this time. Remodulin pump site looks okay at this time.    #SOB  #Fatigue  #Tooth pain  #Pulmonary hypertension    Recommendations:  - recommend imaging/CT of affected tooth to r/o collection and starting antibiotic for treatment of suspected infection  - incentive spirometer and acapella  - can continue bronchodilator  - wean O2 as tolerate, goal O2 >90%  - continue remodulin pump  - Patient to follow up with Dr. Yoon upon discharge    Recommendations not finalized until attending attestation. 43 yo F w/ PMHx PHTN (group I and IV) currently on remodulin and xarelto, chronic hypoxemic respiratory failure on 2L home O2, RA thrombus s/p thrombectomy, Asthma (Pred 10mg), prior AVRNT s/p ablation, bradycardia s/p dual chamber PPM, and hx of abdominal wall abscesses presenting for increased dyspnea and palpitations with HR into 150s. CTPA negative for PE but showing bilateral GGOs. ED POCUS with IVC >2cm, resp variation noted, severe RV dilation. BNP elevated to 1800, prev 800 on recent admission.  No wheezing on exam.  Overall findings consistent with HF exacerbation however infectious etiology remains plausible but less likely.    Recommend:  - Continue Remodulin, please INCREASE to 50ng from 49ng  - Continue Sildenafil 20mg TID  - please continue with Ipratropium neb q6h, previously refused dupixent  - Start Budesonide 0.5mg BID  - recommend continued diuresis as tolerated, s/p Bumex 1mg x 2, needs daily standing weights, strict Is/Os   - Torsemide on hold while rcvg Bumex, continue Aldactone 50mg (new home dose)  - supplemental O2 as needed for sat >90  - c/w chronic Prednisone but would decrease from 15mg to 10mg  - obtain official 2D Echo  - EP consult for PPM interrogation  - continue Xarelto  - please check FULL RVP and Procalcitonin  - monitor off Abx for now    Discussed with Dr. Yoon

## 2024-10-14 NOTE — H&P ADULT - NSICDXPASTSURGICALHX_GEN_ALL_CORE_FT
PAST SURGICAL HISTORY:  H/O tubal ligation     History of pacemaker 12/19 - [employer] model [employer]gevity 4469    History of tubal ligation     Pacemaker

## 2024-10-14 NOTE — CONSULT NOTE ADULT - SUBJECTIVE AND OBJECTIVE BOX
PULMONARY CONSULT NOTE    HPI:   43 yo F w/ PMHx of pHTN (group I and IV on Rimodulin and Xarelto, on home 2L O2), c/b R heart failure, history of RA thrombus s/p thrombectomy, history of DVT/PE (2016), multiple abdominal wall abscesses (s/p I&D), asthma, bradycardia s/p dual chamber PPM. She presents with worsening palpitations and SOB on exertion.  Pt states she is fatigued and tired. Pt states that she came to the hospital because she was experiencing SOB, palpitations, increased heart rate up to 150, and epigastric pain. Pt states that symptoms started to progressively get worse starting on Saturday (10/12). Pt states that her palpitation and SOB were similar to past pulmonary hypertension exacerbations. She states she has a baseline cough that has not changed. However, she states that the epigastric pain is new, however improved today.  She describes it as sharp and non-radiating. Dilaudid has helped with the pain. Pt reports a history of acid reflex, which she states she takes a PPI. She states that her current epigastric pain is not similar to the pain that she experienced with her acid reflex. She denies fever, chills, dysuria, URI symptoms.  She was recently seen at [facility] on 10/8/24 for lip swelling and observed for 24hrs before being discharged (all swelling has resolved).  She is on increased dose of Pred 15mg (10mg for past month for asthma) but increased d/t the swelling.  Her Remodulin dose was increased to 49ng/kg/min in early 2024 at her follow-up visit with Dr. Yoon.  She feels her breathing is somewhat improved since coming in - has received Bumex x 2, is on Atrovent nebs.  However she does have HA today and feels somewhat dizzy/lightheaded while lying is bed.  She remains on 2L NC.    OBJECTIVE:  Vital Signs Last 24 Hrs  T(C): 36.8 (14 Oct 2024 05:13), Max: 36.9 (13 Oct 2024 22:18)  T(F): 98.2 (14 Oct 2024 05:13), Max: 98.5 (14 Oct 2024 04:49)  HR: 110 (14 Oct 2024 05:13) (104 - 120)  BP: 120/82 (14 Oct 2024 05:13) (120/82 - 128/77)  BP(mean): --  RR: 18 (14 Oct 2024 05:13) (16 - 18)  SpO2: 95% (14 Oct 2024 05:13) (95% - 99%)    Parameters below as of 14 Oct 2024 05:13  Patient On (Oxygen Delivery Method): nasal cannula  O2 Flow (L/min): 3    I&O's Detail    Physical Exam:  GENERAL: Awake, alert and interactive, increased work of breathing, appears comfortable  NEURO: A&Ox4, no focal deficits, 5/5 strength in all ext, reflexes 2+ throughout, CN 2-12 intact  HEENT: Normocephalic, atraumatic, no conjunctivitis or scleral icterus, oral mucosa moist, no oral lesions noted  NECK: Supple, no LAD, no JVD, thyroid not palpable  CARDIAC: Tachycardic rate, Regular rhythm, +S1/S2, no murmurs/rubs/gallops  PULM: +Increased work of breath, clear to auscultation bilaterally, +Expiratory wheeze bilaterally, No rales/rhonchi  ABDOMEN: Soft, +epigastric tenderness to palpation, nondistended, +bs, no hepatosplenomegaly, no rebound tenderness or fluid wave, no CVA tenderness  : Deferred  MSK: Range of motion grossly intact, no back tenderness  SKIN: Warm and dry, no rashes, lesions  VASC: Cap refil < 2 sec, 2+ peripheral pulses, no edema, no LE tenderness  Psych: Appropriate affect    Medications:  MEDICATIONS  (STANDING):  ipratropium    for Nebulization 500 MICROGram(s) Nebulizer every 6 hours  pantoprazole    Tablet 40 milliGRAM(s) Oral before breakfast  predniSONE   Tablet 10 milliGRAM(s) Oral daily  rivaroxaban 20 milliGRAM(s) Oral with dinner  sildenafil (REVATIO) 20 milliGRAM(s) Oral <User Schedule>  Treprostinil  SubQ continuous infusion 49 NANOGram(s)/kG/Min   (0.052 mL/Hr) IV Continuous <Continuous>    MEDICATIONS  (PRN):      Labs:                        13.3   13.08 )-----------( 358      ( 13 Oct 2024 23:16 )             43.6     10-13    137  |  105  |  15  ----------------------------<  99  4.0   |  19[L]  |  1.13    Ca    8.3[L]      13 Oct 2024 23:16  Phos  3.1     10-  Mg     1.9     10    TPro  6.6  /  Alb  3.7  /  TBili  1.0  /  DBili  x   /  AST  13  /  ALT  10  /  AlkPhos  62  10-13    PT/INR - ( 13 Oct 2024 23:16 )   PT: 27.5 sec;   INR: 2.41 ratio    PTT - ( 13 Oct 2024 23:16 )  PTT:53.4 sec    Pro-Brain Natriuretic Peptide: 1861 pg/mL (10.13.24 @ 23:16)     Urinalysis Basic - ( 14 Oct 2024 02:48 )    Color: Yellow / Appearance: Clear / S.023 / pH: x  Gluc: x / Ketone: Negative mg/dL  / Bili: Negative / Urobili: 2.0 mg/dL   Blood: x / Protein: Negative mg/dL / Nitrite: Negative   Leuk Esterase: Negative / RBC: x / WBC x   Sq Epi: x / Non Sq Epi: x / Bacteria: x          Radiology:   < from: CT Angio Chest PE Protocol w/ IV Cont (10.14.24 @ 01:08) >  Mixing artifact in the main pulmonary arteries degrading evaluation. No   evidence of acute pulmonary embolism within the confines of this exam.    Patchy groundglass opacities in both lungs similar to that seen on prior   exam. Nonspecific finding could represent pulmonary edema or may be   infectious/inflammatory.    Dilated pulmonary arteries in keeping with patient's known pulmonary   hypertension.    < end of copied text >    < from: POCUS ED TTE 2D F/U, Limited w/o Cont. (10.14.24 @ 04:28) >  IMPRESSION:  Trace pericardial effusion with no sonographic signs of tamponade.  Severe right ventricular dilation.    < end of copied text >

## 2024-10-15 DIAGNOSIS — G43.909 MIGRAINE, UNSPECIFIED, NOT INTRACTABLE, WITHOUT STATUS MIGRAINOSUS: ICD-10-CM

## 2024-10-15 DIAGNOSIS — I26.09 OTHER PULMONARY EMBOLISM WITH ACUTE COR PULMONALE: ICD-10-CM

## 2024-10-15 DIAGNOSIS — R00.1 BRADYCARDIA, UNSPECIFIED: ICD-10-CM

## 2024-10-15 DIAGNOSIS — Z86.718 PERSONAL HISTORY OF OTHER VENOUS THROMBOSIS AND EMBOLISM: ICD-10-CM

## 2024-10-15 LAB
ANION GAP SERPL CALC-SCNC: 15 MMOL/L — SIGNIFICANT CHANGE UP (ref 5–17)
ANION GAP SERPL CALC-SCNC: 17 MMOL/L — SIGNIFICANT CHANGE UP (ref 5–17)
BASOPHILS # BLD AUTO: 0.06 K/UL — SIGNIFICANT CHANGE UP (ref 0–0.2)
BASOPHILS # BLD AUTO: 0.06 K/UL — SIGNIFICANT CHANGE UP (ref 0–0.2)
BASOPHILS NFR BLD AUTO: 0.5 % — SIGNIFICANT CHANGE UP (ref 0–2)
BASOPHILS NFR BLD AUTO: 0.5 % — SIGNIFICANT CHANGE UP (ref 0–2)
BUN SERPL-MCNC: 18 MG/DL — SIGNIFICANT CHANGE UP (ref 7–23)
BUN SERPL-MCNC: 19 MG/DL — SIGNIFICANT CHANGE UP (ref 7–23)
CALCIUM SERPL-MCNC: 8.9 MG/DL — SIGNIFICANT CHANGE UP (ref 8.4–10.5)
CALCIUM SERPL-MCNC: 8.9 MG/DL — SIGNIFICANT CHANGE UP (ref 8.4–10.5)
CHLORIDE SERPL-SCNC: 97 MMOL/L — SIGNIFICANT CHANGE UP (ref 96–108)
CHLORIDE SERPL-SCNC: 98 MMOL/L — SIGNIFICANT CHANGE UP (ref 96–108)
CK SERPL-CCNC: 45 U/L — SIGNIFICANT CHANGE UP (ref 25–170)
CO2 SERPL-SCNC: 19 MMOL/L — LOW (ref 22–31)
CO2 SERPL-SCNC: 22 MMOL/L — SIGNIFICANT CHANGE UP (ref 22–31)
CREAT SERPL-MCNC: 1.09 MG/DL — SIGNIFICANT CHANGE UP (ref 0.5–1.3)
CREAT SERPL-MCNC: 1.21 MG/DL — SIGNIFICANT CHANGE UP (ref 0.5–1.3)
EGFR: 57 ML/MIN/1.73M2 — LOW
EGFR: 64 ML/MIN/1.73M2 — SIGNIFICANT CHANGE UP
EOSINOPHIL # BLD AUTO: 0.09 K/UL — SIGNIFICANT CHANGE UP (ref 0–0.5)
EOSINOPHIL # BLD AUTO: 0.29 K/UL — SIGNIFICANT CHANGE UP (ref 0–0.5)
EOSINOPHIL NFR BLD AUTO: 0.8 % — SIGNIFICANT CHANGE UP (ref 0–6)
EOSINOPHIL NFR BLD AUTO: 2.4 % — SIGNIFICANT CHANGE UP (ref 0–6)
GAS PNL BLDV: SIGNIFICANT CHANGE UP
GLUCOSE BLDC GLUCOMTR-MCNC: 117 MG/DL — HIGH (ref 70–99)
GLUCOSE SERPL-MCNC: 126 MG/DL — HIGH (ref 70–99)
GLUCOSE SERPL-MCNC: 95 MG/DL — SIGNIFICANT CHANGE UP (ref 70–99)
HCT VFR BLD CALC: 48.2 % — HIGH (ref 34.5–45)
HCT VFR BLD CALC: 48.4 % — HIGH (ref 34.5–45)
HGB BLD-MCNC: 14.8 G/DL — SIGNIFICANT CHANGE UP (ref 11.5–15.5)
HGB BLD-MCNC: 15.1 G/DL — SIGNIFICANT CHANGE UP (ref 11.5–15.5)
IMM GRANULOCYTES NFR BLD AUTO: 0.4 % — SIGNIFICANT CHANGE UP (ref 0–0.9)
IMM GRANULOCYTES NFR BLD AUTO: 0.4 % — SIGNIFICANT CHANGE UP (ref 0–0.9)
LYMPHOCYTES # BLD AUTO: 1.39 K/UL — SIGNIFICANT CHANGE UP (ref 1–3.3)
LYMPHOCYTES # BLD AUTO: 11.7 % — LOW (ref 13–44)
LYMPHOCYTES # BLD AUTO: 2.85 K/UL — SIGNIFICANT CHANGE UP (ref 1–3.3)
LYMPHOCYTES # BLD AUTO: 24 % — SIGNIFICANT CHANGE UP (ref 13–44)
MAGNESIUM SERPL-MCNC: 1.7 MG/DL — SIGNIFICANT CHANGE UP (ref 1.6–2.6)
MAGNESIUM SERPL-MCNC: 1.8 MG/DL — SIGNIFICANT CHANGE UP (ref 1.6–2.6)
MCHC RBC-ENTMCNC: 21 PG — LOW (ref 27–34)
MCHC RBC-ENTMCNC: 21.2 PG — LOW (ref 27–34)
MCHC RBC-ENTMCNC: 30.7 GM/DL — LOW (ref 32–36)
MCHC RBC-ENTMCNC: 31.2 GM/DL — LOW (ref 32–36)
MCV RBC AUTO: 67.9 FL — LOW (ref 80–100)
MCV RBC AUTO: 68.5 FL — LOW (ref 80–100)
MONOCYTES # BLD AUTO: 0.58 K/UL — SIGNIFICANT CHANGE UP (ref 0–0.9)
MONOCYTES # BLD AUTO: 0.93 K/UL — HIGH (ref 0–0.9)
MONOCYTES NFR BLD AUTO: 4.9 % — SIGNIFICANT CHANGE UP (ref 2–14)
MONOCYTES NFR BLD AUTO: 7.8 % — SIGNIFICANT CHANGE UP (ref 2–14)
NEUTROPHILS # BLD AUTO: 7.7 K/UL — HIGH (ref 1.8–7.4)
NEUTROPHILS # BLD AUTO: 9.7 K/UL — HIGH (ref 1.8–7.4)
NEUTROPHILS NFR BLD AUTO: 64.9 % — SIGNIFICANT CHANGE UP (ref 43–77)
NEUTROPHILS NFR BLD AUTO: 81.7 % — HIGH (ref 43–77)
NRBC # BLD: 0 /100 WBCS — SIGNIFICANT CHANGE UP (ref 0–0)
NRBC # BLD: 0 /100 WBCS — SIGNIFICANT CHANGE UP (ref 0–0)
PHOSPHATE SERPL-MCNC: 3.2 MG/DL — SIGNIFICANT CHANGE UP (ref 2.5–4.5)
PHOSPHATE SERPL-MCNC: 3.5 MG/DL — SIGNIFICANT CHANGE UP (ref 2.5–4.5)
PLATELET # BLD AUTO: 372 K/UL — SIGNIFICANT CHANGE UP (ref 150–400)
PLATELET # BLD AUTO: 373 K/UL — SIGNIFICANT CHANGE UP (ref 150–400)
POTASSIUM SERPL-MCNC: 3.6 MMOL/L — SIGNIFICANT CHANGE UP (ref 3.5–5.3)
POTASSIUM SERPL-MCNC: 4.1 MMOL/L — SIGNIFICANT CHANGE UP (ref 3.5–5.3)
POTASSIUM SERPL-SCNC: 3.6 MMOL/L — SIGNIFICANT CHANGE UP (ref 3.5–5.3)
POTASSIUM SERPL-SCNC: 4.1 MMOL/L — SIGNIFICANT CHANGE UP (ref 3.5–5.3)
PROCALCITONIN SERPL-MCNC: 0.05 NG/ML — SIGNIFICANT CHANGE UP (ref 0.02–0.1)
RBC # BLD: 7.04 M/UL — HIGH (ref 3.8–5.2)
RBC # BLD: 7.13 M/UL — HIGH (ref 3.8–5.2)
RBC # FLD: 19.9 % — HIGH (ref 10.3–14.5)
RBC # FLD: 20.1 % — HIGH (ref 10.3–14.5)
SODIUM SERPL-SCNC: 133 MMOL/L — LOW (ref 135–145)
SODIUM SERPL-SCNC: 135 MMOL/L — SIGNIFICANT CHANGE UP (ref 135–145)
TROPONIN T, HIGH SENSITIVITY RESULT: <6 NG/L — SIGNIFICANT CHANGE UP (ref 0–51)
WBC # BLD: 11.87 K/UL — HIGH (ref 3.8–10.5)
WBC # BLD: 11.88 K/UL — HIGH (ref 3.8–10.5)
WBC # FLD AUTO: 11.87 K/UL — HIGH (ref 3.8–10.5)
WBC # FLD AUTO: 11.88 K/UL — HIGH (ref 3.8–10.5)

## 2024-10-15 PROCEDURE — 99233 SBSQ HOSP IP/OBS HIGH 50: CPT

## 2024-10-15 PROCEDURE — 99233 SBSQ HOSP IP/OBS HIGH 50: CPT | Mod: GC

## 2024-10-15 PROCEDURE — 93010 ELECTROCARDIOGRAM REPORT: CPT

## 2024-10-15 RX ORDER — BUTALBITAL, ACETAMINOPHEN AND CAFFEINE 50; 325; 40 MG/1; MG/1; MG/1
1 CAPSULE ORAL ONCE
Refills: 0 | Status: COMPLETED | OUTPATIENT
Start: 2024-10-15 | End: 2024-10-15

## 2024-10-15 RX ORDER — SPIRONOLACTONE 100 MG
50 TABLET ORAL DAILY
Refills: 0 | Status: DISCONTINUED | OUTPATIENT
Start: 2024-10-15 | End: 2024-10-16

## 2024-10-15 RX ORDER — BUTALBITAL, ACETAMINOPHEN AND CAFFEINE 50; 325; 40 MG/1; MG/1; MG/1
1 CAPSULE ORAL EVERY 8 HOURS
Refills: 0 | Status: DISCONTINUED | OUTPATIENT
Start: 2024-10-15 | End: 2024-10-30

## 2024-10-15 RX ORDER — HYDROMORPHONE HCL/0.9% NACL/PF 6 MG/30 ML
1 PATIENT CONTROLLED ANALGESIA SYRINGE INTRAVENOUS EVERY 8 HOURS
Refills: 0 | Status: DISCONTINUED | OUTPATIENT
Start: 2024-10-15 | End: 2024-10-15

## 2024-10-15 RX ORDER — SUCRALFATE 1 G/10ML
1 SUSPENSION ORAL
Refills: 0 | Status: DISCONTINUED | OUTPATIENT
Start: 2024-10-15 | End: 2024-10-16

## 2024-10-15 RX ORDER — SUMATRIPTAN SUCCINATE 6 MG/.5ML
50 INJECTION, SOLUTION SUBCUTANEOUS EVERY 12 HOURS
Refills: 0 | Status: DISCONTINUED | OUTPATIENT
Start: 2024-10-15 | End: 2024-10-30

## 2024-10-15 RX ORDER — HYDROMORPHONE HCL/0.9% NACL/PF 6 MG/30 ML
1 PATIENT CONTROLLED ANALGESIA SYRINGE INTRAVENOUS THREE TIMES A DAY
Refills: 0 | Status: DISCONTINUED | OUTPATIENT
Start: 2024-10-15 | End: 2024-10-15

## 2024-10-15 RX ORDER — SUMATRIPTAN SUCCINATE 6 MG/.5ML
50 INJECTION, SOLUTION SUBCUTANEOUS ONCE
Refills: 0 | Status: COMPLETED | OUTPATIENT
Start: 2024-10-15 | End: 2024-10-15

## 2024-10-15 RX ADMIN — IPRATROPIUM BROMIDE 500 MICROGRAM(S): 0.5 SOLUTION RESPIRATORY (INHALATION) at 06:30

## 2024-10-15 RX ADMIN — Medication 1 MILLIGRAM(S): at 06:30

## 2024-10-15 RX ADMIN — Medication 1 MILLIGRAM(S): at 20:50

## 2024-10-15 RX ADMIN — PANTOPRAZOLE SODIUM 40 MILLIGRAM(S): 40 TABLET, DELAYED RELEASE ORAL at 06:31

## 2024-10-15 RX ADMIN — MONTELUKAST SODIUM 10 MILLIGRAM(S): 10 TABLET, FILM COATED ORAL at 10:09

## 2024-10-15 RX ADMIN — IPRATROPIUM BROMIDE 500 MICROGRAM(S): 0.5 SOLUTION RESPIRATORY (INHALATION) at 13:03

## 2024-10-15 RX ADMIN — Medication 30 MILLILITER(S): at 13:03

## 2024-10-15 RX ADMIN — Medication 50 MILLIGRAM(S): at 10:10

## 2024-10-15 RX ADMIN — Medication 650 MILLIGRAM(S): at 13:03

## 2024-10-15 RX ADMIN — RIVAROXABAN 20 MILLIGRAM(S): 20 TABLET, FILM COATED ORAL at 16:39

## 2024-10-15 RX ADMIN — SUMATRIPTAN SUCCINATE 50 MILLIGRAM(S): 6 INJECTION, SOLUTION SUBCUTANEOUS at 10:23

## 2024-10-15 RX ADMIN — Medication 20 MILLIGRAM(S): at 21:03

## 2024-10-15 RX ADMIN — Medication 20 MILLIGRAM(S): at 16:02

## 2024-10-15 RX ADMIN — BUDESONIDE 0.5 MILLIGRAM(S): 3 CAPSULE ORAL at 06:30

## 2024-10-15 RX ADMIN — Medication 650 MILLIGRAM(S): at 14:04

## 2024-10-15 RX ADMIN — Medication 1 MILLIGRAM(S): at 21:30

## 2024-10-15 RX ADMIN — SUCRALFATE 1 GRAM(S): 1 SUSPENSION ORAL at 13:02

## 2024-10-15 RX ADMIN — Medication 10 MILLIGRAM(S): at 06:31

## 2024-10-15 RX ADMIN — Medication 1 MILLIGRAM(S): at 19:00

## 2024-10-15 RX ADMIN — SUMATRIPTAN SUCCINATE 50 MILLIGRAM(S): 6 INJECTION, SOLUTION SUBCUTANEOUS at 10:11

## 2024-10-15 RX ADMIN — Medication 20 MILLIGRAM(S): at 06:31

## 2024-10-15 RX ADMIN — IPRATROPIUM BROMIDE 500 MICROGRAM(S): 0.5 SOLUTION RESPIRATORY (INHALATION) at 23:59

## 2024-10-15 NOTE — PROGRESS NOTE ADULT - PROBLEM SELECTOR PLAN 8
Diet: DASH  DVT ppx: Xarelto 20mg QD  Disposition: Pending course  Code: Full code    Protonix for GERD Bradycardia s/p PPM. Presenting with palpitations.    Plan:  - EP consult for PPM interrogation  - Telemetry monitoring Bradycardia s/p PPM. Presenting with palpitations.    Plan:  - S/p EP consult for PPM interrogation  - Telemetry monitoring

## 2024-10-15 NOTE — PROGRESS NOTE ADULT - SUBJECTIVE AND OBJECTIVE BOX
Internal Medicine Progress Note    Pt is a 44 year old woman with a history of pHTN (group I and IV on Rimodulin and Xarelto, on home 2L O2), c/b R heart failure, history of RA thrombus s/p thrombectomy, history of DVT/PE (2016), multiple abdominal wall abscesses (s/p I&D), asthma, bradycardia s/p dual chamber PPM. She presents with worsening palpitations and SOB on exertion.        OVERNIGHT EVENTS/INTERVAL HPI:  Pt seen at bedside in the ED. No overnight events. Pt states she is fatigued and tired. Pt states that she came to the hospital because she was experiencing SOB, palpitations, increased heart rate up to 150, and epigastric pain. Pt states that symptoms started to progressively get worse starting on Saturday (10/12). Pt states that her palpitation and SOB were similar to past pulmonary hypertension exacerbations. She states she has a baseline cough that has not changed. However, she states that the epigastric plan is not normal. She associates the epigastric pain with her heart palpitations. She describes it as sharp and non-radiating. Dilaudid has helped with the pain. Pt reports a history of acid reflex, which she states she takes a PPI. She states that her current epigastric pain is not similar to the pain that she experienced with her acid reflex. She denies fever, chills, dysuria, URI symptoms.    OBJECTIVE:  Vital Signs Last 24 Hrs  T(C): 36.8 (14 Oct 2024 05:13), Max: 36.9 (13 Oct 2024 22:18)  T(F): 98.2 (14 Oct 2024 05:13), Max: 98.5 (14 Oct 2024 04:49)  HR: 110 (14 Oct 2024 05:13) (104 - 120)  BP: 120/82 (14 Oct 2024 05:13) (120/82 - 128/77)  BP(mean): --  RR: 18 (14 Oct 2024 05:13) (16 - 18)  SpO2: 95% (14 Oct 2024 05:13) (95% - 99%)    Parameters below as of 14 Oct 2024 05:13  Patient On (Oxygen Delivery Method): nasal cannula  O2 Flow (L/min): 3    I&O's Detail    Physical Exam:  GENERAL: Awake, alert and interactive, increased work of breathing, appears comfortable  NEURO: A&Ox4, no focal deficits, 5/5 strength in all ext, reflexes 2+ throughout, CN 2-12 intact  HEENT: Normocephalic, atraumatic, no conjunctivitis or scleral icterus, oral mucosa moist, no oral lesions noted  NECK: Supple, no LAD, no JVD, thyroid not palpable  CARDIAC: Tachycardic rate, Regular rhythm, +S1/S2, no murmurs/rubs/gallops  PULM: +Increased work of breath, clear to auscultation bilaterally, +Expiratory wheeze bilaterally, No rales/rhonchi  ABDOMEN: Soft, +epigastric tenderness to palpation, nondistended, +bs, no hepatosplenomegaly, no rebound tenderness or fluid wave, no CVA tenderness  : Deferred  MSK: Range of motion grossly intact, no back tenderness  SKIN: Warm and dry, no rashes, lesions  VASC: Cap refil < 2 sec, 2+ peripheral pulses, no edema, no LE tenderness  Psych: Appropriate affect    Medications:  MEDICATIONS  (STANDING):  ipratropium    for Nebulization 500 MICROGram(s) Nebulizer every 6 hours  pantoprazole    Tablet 40 milliGRAM(s) Oral before breakfast  predniSONE   Tablet 10 milliGRAM(s) Oral daily  rivaroxaban 20 milliGRAM(s) Oral with dinner  sildenafil (REVATIO) 20 milliGRAM(s) Oral <User Schedule>  Treprostinil  SubQ continuous infusion 48 NANOGram(s)/kG/Min   (0.052 mL/Hr) IV Continuous <Continuous>    MEDICATIONS  (PRN):      Labs:                        13.3   13.08 )-----------( 358      ( 13 Oct 2024 23:16 )             43.6     10-    137  |  105  |  15  ----------------------------<  99  4.0   |  19[L]  |  1.13    Ca    8.3[L]      13 Oct 2024 23:16  Phos  3.1     10-13  Mg     1.9     10-    TPro  6.6  /  Alb  3.7  /  TBili  1.0  /  DBili  x   /  AST  13  /  ALT  10  /  AlkPhos  62  10-13    PT/INR - ( 13 Oct 2024 23:16 )   PT: 27.5 sec;   INR: 2.41 ratio    PTT - ( 13 Oct 2024 23:16 )  PTT:53.4 sec    Pro-Brain Natriuretic Peptide: 1861 pg/mL (10.13.24 @ 23:16)     Urinalysis Basic - ( 14 Oct 2024 02:48 )    Color: Yellow / Appearance: Clear / S.023 / pH: x  Gluc: x / Ketone: Negative mg/dL  / Bili: Negative / Urobili: 2.0 mg/dL   Blood: x / Protein: Negative mg/dL / Nitrite: Negative   Leuk Esterase: Negative / RBC: x / WBC x   Sq Epi: x / Non Sq Epi: x / Bacteria: x          Radiology:   < from: CT Angio Chest PE Protocol w/ IV Cont (10.14.24 @ 01:08) >  Mixing artifact in the main pulmonary arteries degrading evaluation. No   evidence of acute pulmonary embolism within the confines of this exam.    Patchy groundglass opacities in both lungs similar to that seen on prior   exam. Nonspecific finding could represent pulmonary edema or may be   infectious/inflammatory.    Dilated pulmonary arteries in keeping with patient's known pulmonary   hypertension.    < end of copied text >    < from: POCUS ED TTE 2D F/U, Limited w/o Cont. (10.14.24 @ 04:28) >  IMPRESSION:  Trace pericardial effusion with no sonographic signs of tamponade.  Severe right ventricular dilation.    < end of copied text >   Internal Medicine Progress Note    Pt is a 44 year old woman with a history of pHTN (group I and IV on Rimodulin and Xarelto, on home 2L O2), c/b R heart failure, history of RA thrombus s/p thrombectomy, history of DVT/PE (2016), multiple abdominal wall abscesses (s/p I&D), asthma, bradycardia s/p dual chamber PPM. She presents with worsening palpitations and SOB on exertion.        OVERNIGHT EVENTS/INTERVAL HPI:  Pt seen at bedside in the ED. No overnight events. Pt states her SOB has improved. Pt states that her epigastric pain and chest palpitations has improved. However, she notes that she has not ambulated since admission, and her symptoms usually worsening when walking. Pt also reports a headache overnight. She states that it is throbbing, non radiating, and diffuse. She denies any vision changes and photophobia. Pt states the pain revolved with Dilaudid. Pt denies any nausea, vomiting, diarrhea, dysuria, or chest pain.     OBJECTIVE:  ICU Vital Signs Last 24 Hrs  T(C): 36.4 (15 Oct 2024 11:15), Max: 36.7 (14 Oct 2024 19:42)  T(F): 97.5 (15 Oct 2024 11:15), Max: 98 (14 Oct 2024 19:42)  HR: 104 (15 Oct 2024 12:01) (91 - 104)  BP: 141/95 (15 Oct 2024 12:01) (105/71 - 141/95)  BP(mean): 97 (15 Oct 2024 11:15) (89 - 97)  ABP: --  ABP(mean): --  RR: 18 (15 Oct 2024 11:15) (18 - 18)  SpO2: 98% (15 Oct 2024 11:15) (94% - 98%)    O2 Parameters below as of 15 Oct 2024 11:15  Patient On (Oxygen Delivery Method): nasal cannula  O2 Flow (L/min): 2    I&O's Detail    Physical Exam:  GENERAL: Awake, alert and interactive, increased work of breathing, appears comfortable  NEURO: A&Ox4, no focal deficits, 5/5 strength in all ext, reflexes 2+ throughout, CN 2-12 intact  HEENT: Normocephalic, atraumatic, no conjunctivitis or scleral icterus, oral mucosa moist, no oral lesions noted  NECK: Supple, no LAD, no JVD, thyroid not palpable  CARDIAC: Tachycardic rate, Regular rhythm, +S1/S2, no murmurs/rubs/gallops  PULM: +Increased work of breath, clear to auscultation bilaterally, +Expiratory wheeze bilaterally, No rales/rhonchi  ABDOMEN: Soft, +epigastric tenderness to deep palpation, nondistended, +bs, no hepatosplenomegaly, no rebound tenderness or fluid wave, no CVA tenderness  : Deferred  MSK: Range of motion grossly intact, no back tenderness  SKIN: Warm and dry, no rashes, lesions  VASC: Cap refil < 2 sec, 2+ peripheral pulses, no edema, no LE tenderness  Psych: Appropriate affect      MEDICATIONS  (STANDING):  buDESOnide    Inhalation Suspension 0.5 milliGRAM(s) Inhalation two times a day  influenza   Vaccine 0.5 milliLiter(s) IntraMuscular once  ipratropium    for Nebulization 500 MICROGram(s) Nebulizer every 6 hours  montelukast 10 milliGRAM(s) Oral daily  pantoprazole    Tablet 40 milliGRAM(s) Oral before breakfast  predniSONE   Tablet 10 milliGRAM(s) Oral daily  rivaroxaban 20 milliGRAM(s) Oral with dinner  sildenafil (REVATIO) 20 milliGRAM(s) Oral <User Schedule>  spironolactone 50 milliGRAM(s) Oral daily  sucralfate 1 Gram(s) Oral two times a day  Treprostinil  SubQ continuous infusion 48 NANOGram(s)/kG/Min 0.052 mL/Hr (0.05 mL/Hr) IV Continuous <Continuous>    MEDICATIONS  (PRN):  acetaminophen     Tablet .. 650 milliGRAM(s) Oral every 6 hours PRN Temp greater or equal to 38C (100.4F), Mild Pain (1 - 3)  aluminum hydroxide/magnesium hydroxide/simethicone Suspension 30 milliLiter(s) Oral every 4 hours PRN Dyspepsia        Labs:                        14.8   11.88 )-----------( 373      ( 15 Oct 2024 06:38 )             48.2                10-15    133[L]  |  97  |  18  ----------------------------<  95  3.6   |  19[L]  |  1.09    Ca    8.9      15 Oct 2024 06:39  Phos  3.5     10-15  Mg     1.8     10-15    TPro  6.6  /  Alb  3.7  /  TBili  1.0  /  DBili  x   /  AST  13  /  ALT  10  /  AlkPhos  62  10-13      PT/INR - ( 13 Oct 2024 23:16 )   PT: 27.5 sec;   INR: 2.41 ratio    PTT - ( 13 Oct 2024 23:16 )  PTT:53.4 sec    Pro-Brain Natriuretic Peptide: 1861 pg/mL (10.13.24 @ 23:16)     Urinalysis Basic - ( 14 Oct 2024 02:48 )    Color: Yellow / Appearance: Clear / S.023 / pH: x  Gluc: x / Ketone: Negative mg/dL  / Bili: Negative / Urobili: 2.0 mg/dL   Blood: x / Protein: Negative mg/dL / Nitrite: Negative   Leuk Esterase: Negative / RBC: x / WBC x   Sq Epi: x / Non Sq Epi: x / Bacteria: x          Radiology:   < from: CT Angio Chest PE Protocol w/ IV Cont (10.14.24 @ 01:08) >  Mixing artifact in the main pulmonary arteries degrading evaluation. No   evidence of acute pulmonary embolism within the confines of this exam.    Patchy groundglass opacities in both lungs similar to that seen on prior   exam. Nonspecific finding could represent pulmonary edema or may be   infectious/inflammatory.    Dilated pulmonary arteries in keeping with patient's known pulmonary   hypertension.    < end of copied text >    < from: POCUS ED TTE 2D F/U, Limited w/o Cont. (10.14.24 @ 04:28) >  IMPRESSION:  Trace pericardial effusion with no sonographic signs of tamponade.  Severe right ventricular dilation.    < end of copied text >      < from: TTE W or WO Ultrasound Enhancing Agent (10.14.24 @ 17:00) >  CONCLUSIONS:      1. Technically difficult image quality.   2. Left ventricular cavity is small. The interventricular septum is flattened in systole and diastole consistent with right ventricular pressure and volume overload. Left ventricular systolic function is hyperdynamic with an ejection fraction visually estimated at > 70 %. There is poor visualization of the endocardial borders to determine the presence of wall motion abnormalities.   3. The right ventricle is not well visualized. enlarged right ventricular cavity size and reduced right ventricular systolic function.   4. Mild to moderate pulmonic regurgitation.   5. Pulmonary artery systolic pressure could not be estimated.   6. Compared to the transthoracic echocardiogram performed on 2024, the right ventricle was not as well visualized on this study. There is again echocardiographic evidence of right ventricular failure. Quantification of severity is limited.    < end of copied text >     Internal Medicine Progress Note    Pt is a 44 year old woman with a history of pHTN (group I and IV on Rimodulin and Xarelto, on home 2L O2), c/b R heart failure, history of RA thrombus s/p thrombectomy, history of DVT/PE (2016), multiple abdominal wall abscesses (s/p I&D), asthma, bradycardia s/p dual chamber PPM. She presents with worsening palpitations and SOB on exertion.        OVERNIGHT EVENTS/INTERVAL HPI:  Pt seen at bedside in the ED. No overnight events. Pt states her SOB has improved. Pt states that her epigastric pain and chest palpitations has improved. However, she notes that she has not ambulated since admission, and her symptoms usually worsening when walking. Pt also reports a headache overnight. She states that it is throbbing, non radiating, and diffuse. She denies any vision changes and photophobia. Pt states the pain revolved with Dilaudid. Pt denies any nausea, vomiting, diarrhea, dysuria, or chest pain.     RRT was called for rapid heart rate while patient was ambulating on 10/15 afternoon. Pt reports she felt dyspnea while she was ambulating as well as epigastric pain.   patient had a normal BP of 128/84; HR increase to 106 with RR of 24; patient was afebrile and had slightly elevated blood glucose of 117. Patient was laying in bed; AO4.     OBJECTIVE:  ICU Vital Signs Last 24 Hrs  T(C): 36.4 (15 Oct 2024 11:15), Max: 36.7 (14 Oct 2024 19:42)  T(F): 97.5 (15 Oct 2024 11:15), Max: 98 (14 Oct 2024 19:42)  HR: 104 (15 Oct 2024 12:01) (91 - 104)  BP: 141/95 (15 Oct 2024 12:01) (105/71 - 141/95)  BP(mean): 97 (15 Oct 2024 11:15) (89 - 97)  ABP: --  ABP(mean): --  RR: 18 (15 Oct 2024 11:15) (18 - 18)  SpO2: 98% (15 Oct 2024 11:15) (94% - 98%)    O2 Parameters below as of 15 Oct 2024 11:15  Patient On (Oxygen Delivery Method): nasal cannula  O2 Flow (L/min): 2    I&O's Detail    Physical Exam:  GENERAL: Awake, alert and interactive, increased work of breathing, appears comfortable  NEURO: A&Ox4, no focal deficits, 5/5 strength in all ext, reflexes 2+ throughout, CN 2-12 intact  HEENT: Normocephalic, atraumatic, no conjunctivitis or scleral icterus, oral mucosa moist, no oral lesions noted  NECK: Supple, no LAD, no JVD, thyroid not palpable  CARDIAC: Tachycardic rate, Regular rhythm, +S1/S2, no murmurs/rubs/gallops  PULM: +Increased work of breath, clear to auscultation bilaterally, +Expiratory wheeze bilaterally, No rales/rhonchi  ABDOMEN: Soft, +epigastric tenderness to deep palpation, nondistended, +bs, no hepatosplenomegaly, no rebound tenderness or fluid wave, no CVA tenderness  : Deferred  MSK: Range of motion grossly intact, no back tenderness  SKIN: Warm and dry, no rashes, lesions  VASC: Cap refil < 2 sec, 2+ peripheral pulses, no edema, no LE tenderness  Psych: Appropriate affect      MEDICATIONS  (STANDING):  buDESOnide    Inhalation Suspension 0.5 milliGRAM(s) Inhalation two times a day  influenza   Vaccine 0.5 milliLiter(s) IntraMuscular once  ipratropium    for Nebulization 500 MICROGram(s) Nebulizer every 6 hours  montelukast 10 milliGRAM(s) Oral daily  pantoprazole    Tablet 40 milliGRAM(s) Oral before breakfast  predniSONE   Tablet 10 milliGRAM(s) Oral daily  rivaroxaban 20 milliGRAM(s) Oral with dinner  sildenafil (REVATIO) 20 milliGRAM(s) Oral <User Schedule>  spironolactone 50 milliGRAM(s) Oral daily  sucralfate 1 Gram(s) Oral two times a day  Treprostinil  SubQ continuous infusion 48 NANOGram(s)/kG/Min 0.052 mL/Hr (0.05 mL/Hr) IV Continuous <Continuous>    MEDICATIONS  (PRN):  acetaminophen     Tablet .. 650 milliGRAM(s) Oral every 6 hours PRN Temp greater or equal to 38C (100.4F), Mild Pain (1 - 3)  aluminum hydroxide/magnesium hydroxide/simethicone Suspension 30 milliLiter(s) Oral every 4 hours PRN Dyspepsia        Labs:                        14.8   11.88 )-----------( 373      ( 15 Oct 2024 06:38 )             48.2                10-15    133[L]  |  97  |  18  ----------------------------<  95  3.6   |  19[L]  |  1.09    Ca    8.9      15 Oct 2024 06:39  Phos  3.5     10-15  Mg     1.8     10-15    TPro  6.6  /  Alb  3.7  /  TBili  1.0  /  DBili  x   /  AST  13  /  ALT  10  /  AlkPhos  62  10-13      PT/INR - ( 13 Oct 2024 23:16 )   PT: 27.5 sec;   INR: 2.41 ratio    PTT - ( 13 Oct 2024 23:16 )  PTT:53.4 sec    Pro-Brain Natriuretic Peptide: 1861 pg/mL (10.13.24 @ 23:16)     Urinalysis Basic - ( 14 Oct 2024 02:48 )    Color: Yellow / Appearance: Clear / S.023 / pH: x  Gluc: x / Ketone: Negative mg/dL  / Bili: Negative / Urobili: 2.0 mg/dL   Blood: x / Protein: Negative mg/dL / Nitrite: Negative   Leuk Esterase: Negative / RBC: x / WBC x   Sq Epi: x / Non Sq Epi: x / Bacteria: x          Radiology:   < from: CT Angio Chest PE Protocol w/ IV Cont (10.14.24 @ 01:08) >  Mixing artifact in the main pulmonary arteries degrading evaluation. No   evidence of acute pulmonary embolism within the confines of this exam.    Patchy groundglass opacities in both lungs similar to that seen on prior   exam. Nonspecific finding could represent pulmonary edema or may be   infectious/inflammatory.    Dilated pulmonary arteries in keeping with patient's known pulmonary   hypertension.    < end of copied text >    < from: POCUS ED TTE 2D F/U, Limited w/o Cont. (10.14.24 @ 04:28) >  IMPRESSION:  Trace pericardial effusion with no sonographic signs of tamponade.  Severe right ventricular dilation.    < end of copied text >      < from: TTE W or WO Ultrasound Enhancing Agent (10.14.24 @ 17:00) >  CONCLUSIONS:      1. Technically difficult image quality.   2. Left ventricular cavity is small. The interventricular septum is flattened in systole and diastole consistent with right ventricular pressure and volume overload. Left ventricular systolic function is hyperdynamic with an ejection fraction visually estimated at > 70 %. There is poor visualization of the endocardial borders to determine the presence of wall motion abnormalities.   3. The right ventricle is not well visualized. enlarged right ventricular cavity size and reduced right ventricular systolic function.   4. Mild to moderate pulmonic regurgitation.   5. Pulmonary artery systolic pressure could not be estimated.   6. Compared to the transthoracic echocardiogram performed on 2024, the right ventricle was not as well visualized on this study. There is again echocardiographic evidence of right ventricular failure. Quantification of severity is limited.    < end of copied text >     Internal Medicine Progress Note    Pt is a 44 year old woman with a history of pHTN (group I and IV on Rimodulin and Xarelto, on home 2L O2), c/b R heart failure, history of RA thrombus s/p thrombectomy, history of DVT/PE (2016), multiple abdominal wall abscesses (s/p I&D), asthma, bradycardia s/p dual chamber PPM. She presents with worsening palpitations and SOB on exertion.      OVERNIGHT EVENTS/INTERVAL HPI:  Pt seen at bedside in the ED. No overnight events. Pt states her SOB has improved. Pt states that her epigastric pain and chest palpitations has improved. However, she notes that she has not ambulated since admission, and her symptoms usually worsening when walking. Pt also reports a headache overnight. She states that it is throbbing, non radiating, and diffuse. She denies any vision changes and photophobia. Pt states the pain revolved with Dilaudid. Pt denies any nausea, vomiting, diarrhea, dysuria, or chest pain.     RRT was called for rapid heart rate while patient was ambulating on 10/15 afternoon. Pt reports she felt dyspnea while she was ambulating as well as epigastric pain.   patient had a normal BP of 128/84; HR increase to 106 with RR of 24; patient was afebrile and had slightly elevated blood glucose of 117. Patient was laying in bed; AO4.     OBJECTIVE:  ICU Vital Signs Last 24 Hrs  T(C): 36.4 (15 Oct 2024 11:15), Max: 36.7 (14 Oct 2024 19:42)  T(F): 97.5 (15 Oct 2024 11:15), Max: 98 (14 Oct 2024 19:42)  HR: 104 (15 Oct 2024 12:01) (91 - 104)  BP: 141/95 (15 Oct 2024 12:01) (105/71 - 141/95)  BP(mean): 97 (15 Oct 2024 11:15) (89 - 97)  ABP: --  ABP(mean): --  RR: 18 (15 Oct 2024 11:15) (18 - 18)  SpO2: 98% (15 Oct 2024 11:15) (94% - 98%)    O2 Parameters below as of 15 Oct 2024 11:15  Patient On (Oxygen Delivery Method): nasal cannula  O2 Flow (L/min): 2    I&O's Detail    Physical Exam:  GENERAL: Awake, alert and interactive, increased work of breathing, appears comfortable  NEURO: A&Ox4, no focal deficits, 5/5 strength in all ext, reflexes 2+ throughout, CN 2-12 intact  HEENT: Normocephalic, atraumatic, no conjunctivitis or scleral icterus, oral mucosa moist, no oral lesions noted  NECK: Supple, no LAD, no JVD, thyroid not palpable  CARDIAC: Tachycardic rate, Regular rhythm, +S1/S2, no murmurs/rubs/gallops  PULM: +Increased work of breath, clear to auscultation bilaterally, +Expiratory wheeze bilaterally, No rales/rhonchi  ABDOMEN: Soft, +epigastric tenderness to deep palpation, nondistended, +bs, no hepatosplenomegaly, no rebound tenderness or fluid wave, no CVA tenderness  : Deferred  MSK: Range of motion grossly intact, no back tenderness  SKIN: Warm and dry, no rashes, lesions  VASC: Cap refil < 2 sec, 2+ peripheral pulses, no edema, no LE tenderness  Psych: Appropriate affect      MEDICATIONS  (STANDING):  buDESOnide    Inhalation Suspension 0.5 milliGRAM(s) Inhalation two times a day  influenza   Vaccine 0.5 milliLiter(s) IntraMuscular once  ipratropium    for Nebulization 500 MICROGram(s) Nebulizer every 6 hours  montelukast 10 milliGRAM(s) Oral daily  pantoprazole    Tablet 40 milliGRAM(s) Oral before breakfast  predniSONE   Tablet 10 milliGRAM(s) Oral daily  rivaroxaban 20 milliGRAM(s) Oral with dinner  sildenafil (REVATIO) 20 milliGRAM(s) Oral <User Schedule>  spironolactone 50 milliGRAM(s) Oral daily  sucralfate 1 Gram(s) Oral two times a day  Treprostinil  SubQ continuous infusion 48 NANOGram(s)/kG/Min 0.052 mL/Hr (0.05 mL/Hr) IV Continuous <Continuous>    MEDICATIONS  (PRN):  acetaminophen     Tablet .. 650 milliGRAM(s) Oral every 6 hours PRN Temp greater or equal to 38C (100.4F), Mild Pain (1 - 3)  aluminum hydroxide/magnesium hydroxide/simethicone Suspension 30 milliLiter(s) Oral every 4 hours PRN Dyspepsia        Labs:                        14.8   11.88 )-----------( 373      ( 15 Oct 2024 06:38 )             48.2                10-15    133[L]  |  97  |  18  ----------------------------<  95  3.6   |  19[L]  |  1.09    Ca    8.9      15 Oct 2024 06:39  Phos  3.5     10-15  Mg     1.8     10-15    TPro  6.6  /  Alb  3.7  /  TBili  1.0  /  DBili  x   /  AST  13  /  ALT  10  /  AlkPhos  62  10-13      PT/INR - ( 13 Oct 2024 23:16 )   PT: 27.5 sec;   INR: 2.41 ratio    PTT - ( 13 Oct 2024 23:16 )  PTT:53.4 sec    Pro-Brain Natriuretic Peptide: 1861 pg/mL (10.13.24 @ 23:16)     Urinalysis Basic - ( 14 Oct 2024 02:48 )    Color: Yellow / Appearance: Clear / S.023 / pH: x  Gluc: x / Ketone: Negative mg/dL  / Bili: Negative / Urobili: 2.0 mg/dL   Blood: x / Protein: Negative mg/dL / Nitrite: Negative   Leuk Esterase: Negative / RBC: x / WBC x   Sq Epi: x / Non Sq Epi: x / Bacteria: x          Radiology:   < from: CT Angio Chest PE Protocol w/ IV Cont (10.14.24 @ 01:08) >  Mixing artifact in the main pulmonary arteries degrading evaluation. No   evidence of acute pulmonary embolism within the confines of this exam.    Patchy groundglass opacities in both lungs similar to that seen on prior   exam. Nonspecific finding could represent pulmonary edema or may be   infectious/inflammatory.    Dilated pulmonary arteries in keeping with patient's known pulmonary   hypertension.    < end of copied text >    < from: POCUS ED TTE 2D F/U, Limited w/o Cont. (10.14.24 @ 04:28) >  IMPRESSION:  Trace pericardial effusion with no sonographic signs of tamponade.  Severe right ventricular dilation.    < end of copied text >      < from: TTE W or WO Ultrasound Enhancing Agent (10.14.24 @ 17:00) >  CONCLUSIONS:      1. Technically difficult image quality.   2. Left ventricular cavity is small. The interventricular septum is flattened in systole and diastole consistent with right ventricular pressure and volume overload. Left ventricular systolic function is hyperdynamic with an ejection fraction visually estimated at > 70 %. There is poor visualization of the endocardial borders to determine the presence of wall motion abnormalities.   3. The right ventricle is not well visualized. enlarged right ventricular cavity size and reduced right ventricular systolic function.   4. Mild to moderate pulmonic regurgitation.   5. Pulmonary artery systolic pressure could not be estimated.   6. Compared to the transthoracic echocardiogram performed on 2024, the right ventricle was not as well visualized on this study. There is again echocardiographic evidence of right ventricular failure. Quantification of severity is limited.    < end of copied text >

## 2024-10-15 NOTE — PROGRESS NOTE ADULT - SUBJECTIVE AND OBJECTIVE BOX
PULMONARY PROGRESS NOTE    OVERNIGHT EVENTS/INTERVAL HPI:  Pt states her SOB, epigastric pain and palpitations have improved.  Pt also reports continued headache overnight, improves when receives dilaudid. Pt denies any nausea, vomiting, diarrhea, dysuria, or chest pain.   RRT was called for rapid heart rate while patient was ambulating on 10/15 afternoon. Pt reports she felt dyspnea while she was ambulating as well as epigastric pain.   patient had a normal BP of 128/84; HR increase to 170s, improved with rest.     OBJECTIVE:  ICU Vital Signs Last 24 Hrs  T(C): 36.4 (15 Oct 2024 11:15), Max: 36.7 (14 Oct 2024 19:42)  T(F): 97.5 (15 Oct 2024 11:15), Max: 98 (14 Oct 2024 19:42)  HR: 104 (15 Oct 2024 12:01) (91 - 104)  BP: 141/95 (15 Oct 2024 12:01) (105/71 - 141/95)  BP(mean): 97 (15 Oct 2024 11:15) (89 - 97)  ABP: --  ABP(mean): --  RR: 18 (15 Oct 2024 11:15) (18 - 18)  SpO2: 98% (15 Oct 2024 11:15) (94% - 98%)    O2 Parameters below as of 15 Oct 2024 11:15  Patient On (Oxygen Delivery Method): nasal cannula  O2 Flow (L/min): 2    I&O's Detail    Physical Exam:  GENERAL: Awake, alert and interactive, increased work of breathing, appears comfortable  NEURO: A&Ox4, no focal deficits, 5/5 strength in all ext, reflexes 2+ throughout, CN 2-12 intact  HEENT: Normocephalic, atraumatic, no conjunctivitis or scleral icterus, oral mucosa moist, no oral lesions noted  NECK: Supple, no LAD, no JVD, thyroid not palpable  CARDIAC: Tachycardic rate, Regular rhythm, +S1/S2, no murmurs/rubs/gallops  PULM: +Increased work of breath, clear to auscultation bilaterally, +Expiratory wheeze bilaterally, No rales/rhonchi  ABDOMEN: Soft, +epigastric tenderness to deep palpation, nondistended, +bs, no hepatosplenomegaly, no rebound tenderness or fluid wave, no CVA tenderness  : Deferred  MSK: Range of motion grossly intact, no back tenderness  SKIN: Warm and dry, no rashes, lesions  VASC: Cap refil < 2 sec, 2+ peripheral pulses, no edema, no LE tenderness  Psych: Appropriate affect      MEDICATIONS  (STANDING):  buDESOnide    Inhalation Suspension 0.5 milliGRAM(s) Inhalation two times a day  influenza   Vaccine 0.5 milliLiter(s) IntraMuscular once  ipratropium    for Nebulization 500 MICROGram(s) Nebulizer every 6 hours  montelukast 10 milliGRAM(s) Oral daily  pantoprazole    Tablet 40 milliGRAM(s) Oral before breakfast  predniSONE   Tablet 10 milliGRAM(s) Oral daily  rivaroxaban 20 milliGRAM(s) Oral with dinner  sildenafil (REVATIO) 20 milliGRAM(s) Oral <User Schedule>  spironolactone 50 milliGRAM(s) Oral daily  sucralfate 1 Gram(s) Oral two times a day  Treprostinil  SubQ continuous infusion 48 NANOGram(s)/kG/Min 0.052 mL/Hr (0.05 mL/Hr) IV Continuous <Continuous>    MEDICATIONS  (PRN):  acetaminophen     Tablet .. 650 milliGRAM(s) Oral every 6 hours PRN Temp greater or equal to 38C (100.4F), Mild Pain (1 - 3)  aluminum hydroxide/magnesium hydroxide/simethicone Suspension 30 milliLiter(s) Oral every 4 hours PRN Dyspepsia        Labs:                        14.8   11.88 )-----------( 373      ( 15 Oct 2024 06:38 )             48.2                10-15    133[L]  |  97  |  18  ----------------------------<  95  3.6   |  19[L]  |  1.09    Ca    8.9      15 Oct 2024 06:39  Phos  3.5     10-15  Mg     1.8     10-15    TPro  6.6  /  Alb  3.7  /  TBili  1.0  /  DBili  x   /  AST  13  /  ALT  10  /  AlkPhos  62  10-13      PT/INR - ( 13 Oct 2024 23:16 )   PT: 27.5 sec;   INR: 2.41 ratio    PTT - ( 13 Oct 2024 23:16 )  PTT:53.4 sec    Pro-Brain Natriuretic Peptide: 1861 pg/mL (10.13.24 @ 23:16)     Urinalysis Basic - ( 14 Oct 2024 02:48 )    Color: Yellow / Appearance: Clear / S.023 / pH: x  Gluc: x / Ketone: Negative mg/dL  / Bili: Negative / Urobili: 2.0 mg/dL   Blood: x / Protein: Negative mg/dL / Nitrite: Negative   Leuk Esterase: Negative / RBC: x / WBC x   Sq Epi: x / Non Sq Epi: x / Bacteria: x          Radiology:   < from: CT Angio Chest PE Protocol w/ IV Cont (10.14.24 @ 01:08) >  Mixing artifact in the main pulmonary arteries degrading evaluation. No   evidence of acute pulmonary embolism within the confines of this exam.    Patchy groundglass opacities in both lungs similar to that seen on prior   exam. Nonspecific finding could represent pulmonary edema or may be   infectious/inflammatory.    Dilated pulmonary arteries in keeping with patient's known pulmonary   hypertension.    < end of copied text >    < from: POCUS ED TTE 2D F/U, Limited w/o Cont. (10.14.24 @ 04:28) >  IMPRESSION:  Trace pericardial effusion with no sonographic signs of tamponade.  Severe right ventricular dilation.    < end of copied text >      < from: TTE W or WO Ultrasound Enhancing Agent (10.14.24 @ 17:00) >  CONCLUSIONS:      1. Technically difficult image quality.   2. Left ventricular cavity is small. The interventricular septum is flattened in systole and diastole consistent with right ventricular pressure and volume overload. Left ventricular systolic function is hyperdynamic with an ejection fraction visually estimated at > 70 %. There is poor visualization of the endocardial borders to determine the presence of wall motion abnormalities.   3. The right ventricle is not well visualized. enlarged right ventricular cavity size and reduced right ventricular systolic function.   4. Mild to moderate pulmonic regurgitation.   5. Pulmonary artery systolic pressure could not be estimated.   6. Compared to the transthoracic echocardiogram performed on 2024, the right ventricle was not as well visualized on this study. There is again echocardiographic evidence of right ventricular failure. Quantification of severity is limited.    < end of copied text >

## 2024-10-15 NOTE — RAPID RESPONSE TEAM SUMMARY - NSSITUATIONBACKGROUNDRRT_GEN_ALL_CORE
44F w/ severe pHTN (group I and IV on Rimodulin and Xarelto, on home 2L O2), c/b R heart failure, history of RA thrombus s/p thrombectomy, history of DVT/PE (2016), multiple abdominal wall abscesses (s/p I&D), asthma, bradycardia s/p dual chamber PPM. She presents with worsening palpitations and SOB on exertion. Labs show elevated pro-BNP at 1861, which increased from 840 in July 2024. Pt chest ct unchanged from July 2024. Concerning for pHTN exacerbation complicated by right heart failure.     RRT was called for rapid heart rate while patient was ambulating;  On arrival; patient had a normal BP of 128/84; HR had improved to 106 with RR of 24; patient was afebrile and had slightly elevated blood glucose of 117  Patient was laying in bed; AO4; reports she felt dyspnea while she was ambulating    Suspect rapid HR is likely in the setting of her severely decompensated RHF while patient was ambulating; subsequent 12 lead EKGs show no signs of ischemia and had Sinus tachycardia to 107. Review of telemetry shows some possible SVT vs sinus tachycardia; bloodwork showed normal electrolytes.    ORdered CBC/CMP/Mg/Phos/Troponins/    Discussed with primary team who was present at bedside. They will discuss with pulm to optimize pHTN therapy  no further interventions needed from the RRT Team  Ended RRT.

## 2024-10-15 NOTE — PROGRESS NOTE ADULT - PROBLEM SELECTOR PLAN 3
- History of GERD, on protonix 40mg at home.   - epigastric tenderness on palpitation   - Liver Enzymes WNL    Plan:  -c/w protonix   - lipase pending  - CT abdomen considered - History of GERD, on protonix 40mg at home.   - epigastric tenderness on palpitation   - Liver Enzymes WNL  - lipase 16     Plan:  -c/w protonix   - CT abdomen considered - History of GERD, on protonix 40mg at home.   - epigastric tenderness on palpitation   - Liver Enzymes WNL  - lipase 16     Plan:  - C/w protonix   - C/w maalox and sucralfate  - CT abdomen considered

## 2024-10-15 NOTE — PROGRESS NOTE ADULT - PROBLEM SELECTOR PLAN 4
-WBC 13 on admission  - CT (10/13): patchy GGO unchanged from prior  - afebrile, tachycardic   - UA negative  - CT with unchanged patchy GGO  - No localizing symptoms besides SOB which may be attributable to pHTN; No fever/cough    Plan:  - blood cultures pending  - Full RVP pending   - Will monitor off abx for now, low threshold to start -WBC 13 on admission  - CT (10/13): patchy GGO unchanged from prior  - afebrile, tachycardic   - UA negative  - CT with unchanged patchy GGO  - No localizing symptoms besides SOB which may be attributable to pHTN; No fever/cough  - Full RVP negative  - Likely due to chronic steroid use, infectious etiology less likely    Plan:  - blood cultures pending  - Will monitor off abx for now, low threshold to start -WBC 13 on admission  - CT (10/13): patchy GGO unchanged from prior  - afebrile, tachycardic   - UA negative  - CT with unchanged patchy GGO  - No localizing symptoms besides SOB which may be attributable to pHTN; No fever/cough  - Full RVP negative  - Likely due to chronic steroid use, infectious etiology less likely  - BCx NGTD    Plan:  - Will monitor off abx for now, low threshold to start

## 2024-10-15 NOTE — PROGRESS NOTE ADULT - ATTENDING COMMENTS
44F PMH pHTN (group I and IV on Rimodulin and Xarelto, on home 2L O2), c/b R heart failure, history of RA thrombus s/p thrombectomy, history of DVT/PE (2016), multiple abdominal wall abscesses (s/p I&D), asthma, bradycardia s/p dual chamber PPM presenting with SOUZA, palpitations, and epigastric pain.     *RRT called this afternoon for chest pain and tachycardia with ambulation. Patient feels symptoms similar to initial presenting complaints.  Also with epigastric pain. Chest pain reproducible to palpation. O2 >92% on home 2L NC. EKG without acute ST-T wave abnormalities, sinus tachycardia. BMP/trop pending. Suspect related to pHTN.     #pHTN exacerbation  #Chronic hypoxemic respiraotry failure  - Elevated pBNP and pulmonary edema on CXR  - UOP 2L, patient more euvolemic today. Will discuss transition from IV Bumex back to home PO torsemide 20 mg qd with pulm.   - Per pulm, Remodulin increase to 50. Patient declined yesterday, wishes to speak to pulmonary htn regarding dose change.   - Continue Revatio, patient will bring home med as she feels hospital formulation gives her headache.   - TTE unchanged from prior  - Continue O2 therapy with NC, baseline 2 L  #Acute on chronic leukocytosis  #Steroid-induced leukocytosis  - infectious workup negative to date. Monitor off ABX, low threshold to initiate  #Epigastric pain  #hx GERD  - Protonix 40 mg qd, start carafate    - Reviewing, and interpreting labs and testing.  - Independently obtaining a review of systems and performing a physical exam  - Reviewing consultant documentation/recommendations in addition to discussing plan of care with consultants.  - Counselling and educating patient and family regarding interpretation of aforementioned items and plan of care.  - This excludes time spent teaching residents/medical students    Time Spent: 64 minutes

## 2024-10-15 NOTE — PROGRESS NOTE ADULT - ASSESSMENT
45 yo F w/ PMHx PHTN (group I and IV) currently on remodulin and xarelto, chronic hypoxemic respiratory failure on 2L home O2, RA thrombus s/p thrombectomy, Asthma (Pred 10mg), prior AVRNT s/p ablation, bradycardia s/p dual chamber PPM, and hx of abdominal wall abscesses presenting for increased dyspnea and palpitations with HR into 150s. CTPA negative for PE but showing bilateral GGOs. ED POCUS with IVC >2cm, resp variation noted, severe RV dilation. BNP elevated to 1800, prev 800 on recent admission.  No wheezing on exam.  Overall findings consistent with HF exacerbation however infectious etiology remains plausible but less likely. Echo with RV dilation, mildly reduced RV function. EP investigated PPM but no arrhythmias noted?    Recommend:  - Continue Remodulin, increased to 51ng/kg/min (based on 92kg weight), currently running at 0.056ml/hr starting 10/15  - Continue Sildenafil 20mg TID  - please continue with Ipratropium neb q6h  - continue Budesonide 0.5mg BID  - Bumex 1mg IV BID for today as remains ~5kg above base weight  - daily standing weights, strict Is/Os, monitor BUN/Cr  - Torsemide on hold while rcvg Bumex, continue Aldactone 50mg (new home dose)  - supplemental O2 as needed for sat >90, currently on 2.5L NC  - Prednisone 10mg, barring any swelling w/ increased Remodulin dose will try to continue to taper off during admission  - continue Xarelto  - monitor off Abx for now    Discussed with Dr. Yoon

## 2024-10-15 NOTE — PROGRESS NOTE ADULT - PROBLEM SELECTOR PLAN 7
Bradycardia s/p PPM. Presenting with palpitations.    Plan:  - EP consult for PPM interrogation  - Telemetry monitoring History of asthma, on Atrovent PRN outpatient, was on Prednisone 10mg QD for pHTN    Plan:  - Pulbritton aguero appreciated  - Will give Atrovent nebulizer q6h, patient refused albuterol stating it makes her heart race  - Will c/w Prednisone 10mg  - Start Singulair 10mg PO  - Start Budesonide 0.5mg inhaled BID

## 2024-10-15 NOTE — PROGRESS NOTE ADULT - PROBLEM SELECTOR PLAN 1
-History of group 1 + 4 pHTN on Sildenafil and Rimodulin   -pro-BNP elevated at 1861  -CXR with pulm edema    Plan:  - c/w Sildenafil and Rimodulin increase to 50mg  - Pulm recs appreciated  - Home torsemide 20 mg qd on hold. Continue Bumex 1mg IV BID.  - TTE pending  - Continue O2 therapy with NC, baseline 2 L -History of group 1 + 4 pHTN on Sildenafil and Rimodulin   -pro-BNP elevated at 1861  -CXR with pulm edema  -TTE on 10/14 shows enlarged right ventricular cavity size and reduced right ventricular systolic function with evidence of right ventricular failure.   - Pt advised to ambulate   - Pt's findings consistent with Pulmonary hypertension exacerbation however infectious etiology remains plausible but less likely.    Plan:  - Pulm recs appreciated  - c/w Sildenafil and Rimodulin increased to 50ng  - Home torsemide 20 mg qd on hold. Continue Bumex 1mg IV BID.  - start Aldactone 50mg  - Continue O2 therapy with NC, baseline 2 L -History of group 1 + 4 pHTN on Sildenafil and Rimodulin   -pro-BNP elevated at 1861  -CXR with pulm edema  -TTE on 10/14 shows enlarged right ventricular cavity size and reduced right ventricular systolic function with evidence of right ventricular failure.   - Pt advised to ambulate   - Pt's findings consistent with Pulmonary hypertension exacerbation however infectious etiology remains plausible but less likely.    Plan:  - Pulm recs appreciated  - C/w Sildenafil 20mg TID and Remodulin 49ng, planning to increase to 50ng pending accurate standing weights  - Home torsemide 20 mg qd on hold. Continue Bumex 1mg IV BID.  - Start Aldactone 50mg  - Continue O2 therapy with NC, baseline 2 L  - Encourage ambulation as tolerated

## 2024-10-15 NOTE — PROGRESS NOTE ADULT - ASSESSMENT
44 year old woman with a history of pHTN (group I and IV on Rimodulin and Xarelto, on home 2L O2), c/b R heart failure, history of RA thrombus s/p thrombectomy, history of DVT/PE (2016), multiple abdominal wall abscesses (s/p I&D), asthma, bradycardia s/p dual chamber PPM. She presents with worsening palpitations and SOB on exertion. Labs show elevated pro-BNP at 1861, which increased from 840 in July 2024. Pt chest ct unchanged from July 2024. Concerning for pHTN exacerbation complicated by right heart failure.

## 2024-10-15 NOTE — PROGRESS NOTE ADULT - PROBLEM SELECTOR PLAN 2
-History of RV failure iso pHTN, presented with worsening SOUZA, on exam with JVD  - pro-BNP elevated at 1861  - Home regimen: Aldactone 25mg QD, Torsemide 20mg QD  - s/p Torsemide 20mg PO + 1mg Bumex (10/14) in ED.     Plan:  - continue Torsemide 20mg PO  - start Bumex 1mg BID.    - Strict I&O, daily weight  - Will repeat TTE -History of RV failure iso pHTN, presented with worsening SOUZA, on exam with JVD  - pro-BNP elevated at 1861  - Home regimen: Aldactone 25mg QD, Torsemide 20mg QD  - s/p Torsemide 20mg PO + 1mg Bumex (10/14) in ED.   -  TTE on 10/14 shows enlarged right ventricular cavity size and reduced right ventricular systolic function with evidence of right ventricular failure.     Plan:  - Home torsemide 20 mg qd on hold. Continue Bumex 1mg IV BID.  - continue Aldactone 50mg  - Strict I&O, daily weight -History of RV failure iso pHTN, presented with worsening SOUZA, on exam with JVD  - pro-BNP elevated at 1861  - Home regimen: Aldactone 25mg QD, Torsemide 20mg QD  - s/p Torsemide 20mg PO + 1mg Bumex (10/14) in ED.   -  TTE on 10/14 shows enlarged right ventricular cavity size and reduced right ventricular systolic function with evidence of right ventricular failure. EF 70%.   - Standing weight on 10/14: 97.6 kg    Plan:  - Home torsemide 20 mg qd on hold. Continue Bumex 1mg IV BID  - Continue Aldactone 50mg  - Strict I&O, daily standing weight  - Consider transition to torsemide

## 2024-10-15 NOTE — PROGRESS NOTE ADULT - PROBLEM SELECTOR PLAN 5
- Pt on 2 L O2 at home   - currently 95 spO2 on 3L nasal canula.     Plan:   - continue on O2   - Monitor RR and O2 saturation. - Pt reports migraine headaches starting on 10/14.   - Pt concerned that headache caused by different brand of sildenafil than her home regiment. Pt instructed to bring home medication to hospital.     Plan:  - Sumatriptan 50mg PO   - Reassess pt headache after sumatriptan - Pt reports migraine headaches starting on 10/14.  - Pt concerned that headache caused by different brand of sildenafil than her home regiment. Pt instructed to bring home medication to hospital.     Plan:  - S/p dilaudid prn  - Start Sumatriptan 50mg PO and Esgic   - Reassess pt headache after sumatriptan

## 2024-10-15 NOTE — PROGRESS NOTE ADULT - PROBLEM SELECTOR PLAN 6
History of asthma, on Atrovent PRN outpatient, was on Prednisone 10mg QD for pHTN    Plan:  - Pulm consulted  - Will give Atrovent nebulizer q6h, patient refused albuterol stating it makes her heart race  - Will c/w Prednisone 10mg for now, defer whether or not to increase to pulm - Pt on 2 L O2 at home   - currently 97 spO2 on 2L nasal canula.     Plan:   - continue on O2   - Monitor RR and O2 saturation.  - supplemental O2 as needed for sat >90

## 2024-10-15 NOTE — PROGRESS NOTE ADULT - PROBLEM SELECTOR PLAN 9
Diet: DASH  DVT ppx: Xarelto 20mg QD  Disposition: Pending course  Code: Full code    Protonix for GERD

## 2024-10-16 DIAGNOSIS — R00.2 PALPITATIONS: ICD-10-CM

## 2024-10-16 LAB
ALBUMIN SERPL ELPH-MCNC: 4 G/DL — SIGNIFICANT CHANGE UP (ref 3.3–5)
ALP SERPL-CCNC: 61 U/L — SIGNIFICANT CHANGE UP (ref 40–120)
ALT FLD-CCNC: 12 U/L — SIGNIFICANT CHANGE UP (ref 10–45)
ANION GAP SERPL CALC-SCNC: 18 MMOL/L — HIGH (ref 5–17)
AST SERPL-CCNC: 12 U/L — SIGNIFICANT CHANGE UP (ref 10–40)
BASOPHILS # BLD AUTO: 0.07 K/UL — SIGNIFICANT CHANGE UP (ref 0–0.2)
BASOPHILS NFR BLD AUTO: 0.6 % — SIGNIFICANT CHANGE UP (ref 0–2)
BILIRUB SERPL-MCNC: 0.8 MG/DL — SIGNIFICANT CHANGE UP (ref 0.2–1.2)
BUN SERPL-MCNC: 17 MG/DL — SIGNIFICANT CHANGE UP (ref 7–23)
CALCIUM SERPL-MCNC: 8.4 MG/DL — SIGNIFICANT CHANGE UP (ref 8.4–10.5)
CHLORIDE SERPL-SCNC: 95 MMOL/L — LOW (ref 96–108)
CO2 SERPL-SCNC: 22 MMOL/L — SIGNIFICANT CHANGE UP (ref 22–31)
CREAT SERPL-MCNC: 1.18 MG/DL — SIGNIFICANT CHANGE UP (ref 0.5–1.3)
EGFR: 58 ML/MIN/1.73M2 — LOW
EOSINOPHIL # BLD AUTO: 0.17 K/UL — SIGNIFICANT CHANGE UP (ref 0–0.5)
EOSINOPHIL NFR BLD AUTO: 1.4 % — SIGNIFICANT CHANGE UP (ref 0–6)
GLUCOSE SERPL-MCNC: 129 MG/DL — HIGH (ref 70–99)
HCT VFR BLD CALC: 46.6 % — HIGH (ref 34.5–45)
HGB BLD-MCNC: 14.4 G/DL — SIGNIFICANT CHANGE UP (ref 11.5–15.5)
IMM GRANULOCYTES NFR BLD AUTO: 0.3 % — SIGNIFICANT CHANGE UP (ref 0–0.9)
LYMPHOCYTES # BLD AUTO: 17.2 % — SIGNIFICANT CHANGE UP (ref 13–44)
LYMPHOCYTES # BLD AUTO: 2.06 K/UL — SIGNIFICANT CHANGE UP (ref 1–3.3)
MAGNESIUM SERPL-MCNC: 1.6 MG/DL — SIGNIFICANT CHANGE UP (ref 1.6–2.6)
MCHC RBC-ENTMCNC: 21.2 PG — LOW (ref 27–34)
MCHC RBC-ENTMCNC: 30.9 GM/DL — LOW (ref 32–36)
MCV RBC AUTO: 68.5 FL — LOW (ref 80–100)
MONOCYTES # BLD AUTO: 1.01 K/UL — HIGH (ref 0–0.9)
MONOCYTES NFR BLD AUTO: 8.4 % — SIGNIFICANT CHANGE UP (ref 2–14)
NEUTROPHILS # BLD AUTO: 8.63 K/UL — HIGH (ref 1.8–7.4)
NEUTROPHILS NFR BLD AUTO: 72.1 % — SIGNIFICANT CHANGE UP (ref 43–77)
NRBC # BLD: 0 /100 WBCS — SIGNIFICANT CHANGE UP (ref 0–0)
PHOSPHATE SERPL-MCNC: 2.6 MG/DL — SIGNIFICANT CHANGE UP (ref 2.5–4.5)
PLATELET # BLD AUTO: 368 K/UL — SIGNIFICANT CHANGE UP (ref 150–400)
POTASSIUM SERPL-MCNC: 3.3 MMOL/L — LOW (ref 3.5–5.3)
POTASSIUM SERPL-SCNC: 3.3 MMOL/L — LOW (ref 3.5–5.3)
PROT SERPL-MCNC: 7.2 G/DL — SIGNIFICANT CHANGE UP (ref 6–8.3)
RBC # BLD: 6.8 M/UL — HIGH (ref 3.8–5.2)
RBC # FLD: 20.2 % — HIGH (ref 10.3–14.5)
SODIUM SERPL-SCNC: 135 MMOL/L — SIGNIFICANT CHANGE UP (ref 135–145)
WBC # BLD: 11.98 K/UL — HIGH (ref 3.8–10.5)
WBC # FLD AUTO: 11.98 K/UL — HIGH (ref 3.8–10.5)

## 2024-10-16 PROCEDURE — 99233 SBSQ HOSP IP/OBS HIGH 50: CPT

## 2024-10-16 PROCEDURE — 99232 SBSQ HOSP IP/OBS MODERATE 35: CPT | Mod: GC

## 2024-10-16 PROCEDURE — 99223 1ST HOSP IP/OBS HIGH 75: CPT

## 2024-10-16 RX ORDER — HYDROMORPHONE HCL/0.9% NACL/PF 6 MG/30 ML
1 PATIENT CONTROLLED ANALGESIA SYRINGE INTRAVENOUS EVERY 12 HOURS
Refills: 0 | Status: DISCONTINUED | OUTPATIENT
Start: 2024-10-16 | End: 2024-10-16

## 2024-10-16 RX ORDER — FLUTICASONE PROPIONATE 50 UG/1
1 SPRAY, METERED NASAL
Refills: 0 | Status: COMPLETED | OUTPATIENT
Start: 2024-10-16 | End: 2024-10-18

## 2024-10-16 RX ORDER — MAGNESIUM OXIDE 400 MG/1
400 TABLET ORAL ONCE
Refills: 0 | Status: COMPLETED | OUTPATIENT
Start: 2024-10-16 | End: 2024-10-16

## 2024-10-16 RX ORDER — HYDROMORPHONE HCL/0.9% NACL/PF 6 MG/30 ML
0.25 PATIENT CONTROLLED ANALGESIA SYRINGE INTRAVENOUS EVERY 12 HOURS
Refills: 0 | Status: DISCONTINUED | OUTPATIENT
Start: 2024-10-16 | End: 2024-10-17

## 2024-10-16 RX ORDER — POTASSIUM CHLORIDE 10 MEQ
40 TABLET, EXTENDED RELEASE ORAL ONCE
Refills: 0 | Status: COMPLETED | OUTPATIENT
Start: 2024-10-16 | End: 2024-10-16

## 2024-10-16 RX ORDER — SPIRONOLACTONE 100 MG
50 TABLET ORAL
Refills: 0 | Status: DISCONTINUED | OUTPATIENT
Start: 2024-10-16 | End: 2024-10-30

## 2024-10-16 RX ADMIN — PANTOPRAZOLE SODIUM 40 MILLIGRAM(S): 40 TABLET, DELAYED RELEASE ORAL at 06:09

## 2024-10-16 RX ADMIN — Medication 0.25 MILLIGRAM(S): at 22:03

## 2024-10-16 RX ADMIN — Medication 20 MILLIGRAM(S): at 06:09

## 2024-10-16 RX ADMIN — Medication 50 MILLIGRAM(S): at 09:02

## 2024-10-16 RX ADMIN — Medication 0.25 MILLIGRAM(S): at 22:35

## 2024-10-16 RX ADMIN — Medication 1 MILLIGRAM(S): at 06:09

## 2024-10-16 RX ADMIN — IPRATROPIUM BROMIDE 500 MICROGRAM(S): 0.5 SOLUTION RESPIRATORY (INHALATION) at 06:10

## 2024-10-16 RX ADMIN — Medication 20 MILLIGRAM(S): at 16:21

## 2024-10-16 RX ADMIN — BUDESONIDE 0.5 MILLIGRAM(S): 3 CAPSULE ORAL at 06:10

## 2024-10-16 RX ADMIN — IPRATROPIUM BROMIDE 500 MICROGRAM(S): 0.5 SOLUTION RESPIRATORY (INHALATION) at 16:22

## 2024-10-16 RX ADMIN — Medication 0.25 MILLIGRAM(S): at 09:53

## 2024-10-16 RX ADMIN — BUDESONIDE 0.5 MILLIGRAM(S): 3 CAPSULE ORAL at 16:21

## 2024-10-16 RX ADMIN — RIVAROXABAN 20 MILLIGRAM(S): 20 TABLET, FILM COATED ORAL at 16:21

## 2024-10-16 RX ADMIN — MONTELUKAST SODIUM 10 MILLIGRAM(S): 10 TABLET, FILM COATED ORAL at 09:02

## 2024-10-16 RX ADMIN — FLUTICASONE PROPIONATE 1 SPRAY(S): 50 SPRAY, METERED NASAL at 16:20

## 2024-10-16 RX ADMIN — SUCRALFATE 1 GRAM(S): 1 SUSPENSION ORAL at 06:09

## 2024-10-16 RX ADMIN — Medication 0.25 MILLIGRAM(S): at 10:08

## 2024-10-16 RX ADMIN — Medication 20 MILLIGRAM(S): at 22:03

## 2024-10-16 RX ADMIN — Medication 10 MILLIGRAM(S): at 06:09

## 2024-10-16 RX ADMIN — MAGNESIUM OXIDE 400 MILLIGRAM(S): 400 TABLET ORAL at 09:55

## 2024-10-16 RX ADMIN — Medication 50 MILLIGRAM(S): at 22:51

## 2024-10-16 RX ADMIN — Medication 40 MILLIEQUIVALENT(S): at 09:54

## 2024-10-16 NOTE — PROGRESS NOTE ADULT - SUBJECTIVE AND OBJECTIVE BOX
PULMONARY Progress Note      OVERNIGHT EVENTS/INTERVAL HPI:  No overnight events. SOB has improved. Pt reports no further epigastric pain.  Continues to report HA.  Hasn't ambulated in [place] yet today only from bed to chair.  Notes less UOP than when first admitted.      OBJECTIVE:  Vital Signs Last 24 Hrs  T(C): 36.8 (16 Oct 2024 11:00), Max: 36.8 (16 Oct 2024 05:51)  T(F): 98.3 (16 Oct 2024 11:00), Max: 98.3 (16 Oct 2024 11:00)  HR: 91 (16 Oct 2024 11:00) (91 - 108)  BP: 100/69 (16 Oct 2024 11:00) (100/69 - 134/79)  BP(mean): 100 (15 Oct 2024 16:37) (100 - 100)  RR: 18 (16 Oct 2024 11:00) (18 - 18)  SpO2: 97% (16 Oct 2024 11:00) (93% - 99%)    Parameters below as of 16 Oct 2024 11:00  Patient On (Oxygen Delivery Method): nasal cannula    I&O's Detail    15 Oct 2024 07:01  -  16 Oct 2024 07:00  --------------------------------------------------------  IN:    freetext medication - Infusion: 0.5 mL    Oral Fluid: 1200 mL  Total IN: 1200.5 mL    OUT:    Voided (mL): 1500 mL  Total OUT: 1500 mL    Total NET: -299.5 mL      16 Oct 2024 07:01  -  16 Oct 2024 13:53  --------------------------------------------------------  IN:    freetext medication - Infusion: 0.2 mL    Oral Fluid: 620 mL  Total IN: 620.2 mL    OUT:  Total OUT: 0 mL    Total NET: 620.2 mL        Physical Exam:  GENERAL: Awake, alert and interactive, increased work of breathing, appears comfortable  NEURO: A&Ox4, no focal deficits, 5/5 strength in all ext, reflexes 2+ throughout, CN 2-12 intact  HEENT: Normocephalic, atraumatic, no conjunctivitis or scleral icterus, oral mucosa moist, no oral lesions noted  NECK: Supple, no LAD, no JVD, thyroid not palpable  CARDIAC: Tachycardic rate, Regular rhythm, +S1/S2, no murmurs/rubs/gallops  PULM: +Increased work of breath, clear to auscultation bilaterally, +Expiratory wheeze bilaterally, No rales/rhonchi  ABDOMEN: Soft, +epigastric tenderness to deep palpation, nondistended, +bs, no hepatosplenomegaly, no rebound tenderness or fluid wave, no CVA tenderness  MSK: Range of motion grossly intact, no back tenderness  SKIN: Warm and dry, no rashes, lesions  VASC: Cap refil < 2 sec, 2+ peripheral pulses, no edema, no LE tenderness  Psych: Appropriate affect      MEDICATIONS  (STANDING):  buDESOnide    Inhalation Suspension 0.5 milliGRAM(s) Inhalation two times a day  influenza   Vaccine 0.5 milliLiter(s) IntraMuscular once  ipratropium    for Nebulization 500 MICROGram(s) Nebulizer every 6 hours  montelukast 10 milliGRAM(s) Oral daily  pantoprazole    Tablet 40 milliGRAM(s) Oral before breakfast  predniSONE   Tablet 10 milliGRAM(s) Oral daily  rivaroxaban 20 milliGRAM(s) Oral with dinner  sildenafil (REVATIO) 20 milliGRAM(s) Oral <User Schedule>  spironolactone 50 milliGRAM(s) Oral daily  sucralfate 1 Gram(s) Oral two times a day  Treprostinil  SubQ continuous infusion 48 NANOGram(s)/kG/Min 0.052 mL/Hr (0.05 mL/Hr) IV Continuous <Continuous>    MEDICATIONS  (PRN):  acetaminophen     Tablet .. 650 milliGRAM(s) Oral every 6 hours PRN Temp greater or equal to 38C (100.4F), Mild Pain (1 - 3)  aluminum hydroxide/magnesium hydroxide/simethicone Suspension 30 milliLiter(s) Oral every 4 hours PRN Dyspepsia        Labs:                                   14.4   11.98 )-----------( 368      ( 16 Oct 2024 09:11 )             46.6     10-16    135  |  95[L]  |  17  ----------------------------<  129[H]  3.3[L]   |  22  |  1.18    Ca    8.4      16 Oct 2024 09:11  Phos  2.6     10-16  Mg     1.6     10-16    TPro  7.2  /  Alb  4.0  /  TBili  0.8  /  DBili  x   /  AST  12  /  ALT  12  /  AlkPhos  61  10-16        Radiology:   < from: CT Angio Chest PE Protocol w/ IV Cont (10.14.24 @ 01:08) >  Mixing artifact in the main pulmonary arteries degrading evaluation. No   evidence of acute pulmonary embolism within the confines of this exam.    Patchy groundglass opacities in both lungs similar to that seen on prior   exam. Nonspecific finding could represent pulmonary edema or may be   infectious/inflammatory.    Dilated pulmonary arteries in keeping with patient's known pulmonary   hypertension.    < end of copied text >    < from: POCUS ED TTE 2D F/U, Limited w/o Cont. (10.14.24 @ 04:28) >  IMPRESSION:  Trace pericardial effusion with no sonographic signs of tamponade.  Severe right ventricular dilation.    < end of copied text >      < from: TTE W or WO Ultrasound Enhancing Agent (10.14.24 @ 17:00) >  CONCLUSIONS:      1. Technically difficult image quality.   2. Left ventricular cavity is small. The interventricular septum is flattened in systole and diastole consistent with right ventricular pressure and volume overload. Left ventricular systolic function is hyperdynamic with an ejection fraction visually estimated at > 70 %. There is poor visualization of the endocardial borders to determine the presence of wall motion abnormalities.   3. The right ventricle is not well visualized. enlarged right ventricular cavity size and reduced right ventricular systolic function.   4. Mild to moderate pulmonic regurgitation.   5. Pulmonary artery systolic pressure could not be estimated.   6. Compared to the transthoracic echocardiogram performed on 7/16/2024, the right ventricle was not as well visualized on this study. There is again echocardiographic evidence of right ventricular failure. Quantification of severity is limited.    < end of copied text >

## 2024-10-16 NOTE — PROGRESS NOTE ADULT - SUBJECTIVE AND OBJECTIVE BOX
Internal Medicine Progress Note    Pt is a 44 year old woman with a history of pHTN (group I and IV on Rimodulin and Xarelto, on home 2L O2), c/b R heart failure, history of RA thrombus s/p thrombectomy, history of DVT/PE (2016), multiple abdominal wall abscesses (s/p I&D), asthma, bradycardia s/p dual chamber PPM. She presents with worsening palpitations and SOB on exertion.      OVERNIGHT EVENTS/INTERVAL HPI:  Pt seen at bedside in the ED. No overnight events. Pt states her SOB has improved. Pt states that her epigastric pain and chest palpitations has improved. However, she notes that she has not ambulated since admission, and her symptoms usually worsening when walking. Pt also reports a headache overnight. She states that it is throbbing, non radiating, and diffuse. She denies any vision changes and photophobia. Pt states the pain revolved with Dilaudid. Pt denies any nausea, vomiting, diarrhea, dysuria, or chest pain.     RRT was called for rapid heart rate while patient was ambulating on 10/15 afternoon. Pt reports she felt dyspnea while she was ambulating as well as epigastric pain.   patient had a normal BP of 128/84; HR increase to 106 with RR of 24; patient was afebrile and had slightly elevated blood glucose of 117. Patient was laying in bed; AO4.     OBJECTIVE:  ICU Vital Signs Last 24 Hrs  T(C): 36.4 (15 Oct 2024 11:15), Max: 36.7 (14 Oct 2024 19:42)  T(F): 97.5 (15 Oct 2024 11:15), Max: 98 (14 Oct 2024 19:42)  HR: 104 (15 Oct 2024 12:01) (91 - 104)  BP: 141/95 (15 Oct 2024 12:01) (105/71 - 141/95)  BP(mean): 97 (15 Oct 2024 11:15) (89 - 97)  ABP: --  ABP(mean): --  RR: 18 (15 Oct 2024 11:15) (18 - 18)  SpO2: 98% (15 Oct 2024 11:15) (94% - 98%)    O2 Parameters below as of 15 Oct 2024 11:15  Patient On (Oxygen Delivery Method): nasal cannula  O2 Flow (L/min): 2    I&O's Detail    Physical Exam:  GENERAL: Awake, alert and interactive, increased work of breathing, appears comfortable  NEURO: A&Ox4, no focal deficits, 5/5 strength in all ext, reflexes 2+ throughout, CN 2-12 intact  HEENT: Normocephalic, atraumatic, no conjunctivitis or scleral icterus, oral mucosa moist, no oral lesions noted  NECK: Supple, no LAD, no JVD, thyroid not palpable  CARDIAC: Tachycardic rate, Regular rhythm, +S1/S2, no murmurs/rubs/gallops  PULM: +Increased work of breath, clear to auscultation bilaterally, +Expiratory wheeze bilaterally, No rales/rhonchi  ABDOMEN: Soft, +epigastric tenderness to deep palpation, nondistended, +bs, no hepatosplenomegaly, no rebound tenderness or fluid wave, no CVA tenderness  : Deferred  MSK: Range of motion grossly intact, no back tenderness  SKIN: Warm and dry, no rashes, lesions  VASC: Cap refil < 2 sec, 2+ peripheral pulses, no edema, no LE tenderness  Psych: Appropriate affect      MEDICATIONS  (STANDING):  buDESOnide    Inhalation Suspension 0.5 milliGRAM(s) Inhalation two times a day  influenza   Vaccine 0.5 milliLiter(s) IntraMuscular once  ipratropium    for Nebulization 500 MICROGram(s) Nebulizer every 6 hours  montelukast 10 milliGRAM(s) Oral daily  pantoprazole    Tablet 40 milliGRAM(s) Oral before breakfast  predniSONE   Tablet 10 milliGRAM(s) Oral daily  rivaroxaban 20 milliGRAM(s) Oral with dinner  sildenafil (REVATIO) 20 milliGRAM(s) Oral <User Schedule>  spironolactone 50 milliGRAM(s) Oral daily  sucralfate 1 Gram(s) Oral two times a day  Treprostinil  SubQ continuous infusion 48 NANOGram(s)/kG/Min 0.052 mL/Hr (0.05 mL/Hr) IV Continuous <Continuous>    MEDICATIONS  (PRN):  acetaminophen     Tablet .. 650 milliGRAM(s) Oral every 6 hours PRN Temp greater or equal to 38C (100.4F), Mild Pain (1 - 3)  aluminum hydroxide/magnesium hydroxide/simethicone Suspension 30 milliLiter(s) Oral every 4 hours PRN Dyspepsia        Labs:                        14.8   11.88 )-----------( 373      ( 15 Oct 2024 06:38 )             48.2                10-15    133[L]  |  97  |  18  ----------------------------<  95  3.6   |  19[L]  |  1.09    Ca    8.9      15 Oct 2024 06:39  Phos  3.5     10-15  Mg     1.8     10-15    TPro  6.6  /  Alb  3.7  /  TBili  1.0  /  DBili  x   /  AST  13  /  ALT  10  /  AlkPhos  62  10-13      PT/INR - ( 13 Oct 2024 23:16 )   PT: 27.5 sec;   INR: 2.41 ratio    PTT - ( 13 Oct 2024 23:16 )  PTT:53.4 sec    Pro-Brain Natriuretic Peptide: 1861 pg/mL (10.13.24 @ 23:16)     Urinalysis Basic - ( 14 Oct 2024 02:48 )    Color: Yellow / Appearance: Clear / S.023 / pH: x  Gluc: x / Ketone: Negative mg/dL  / Bili: Negative / Urobili: 2.0 mg/dL   Blood: x / Protein: Negative mg/dL / Nitrite: Negative   Leuk Esterase: Negative / RBC: x / WBC x   Sq Epi: x / Non Sq Epi: x / Bacteria: x          Radiology:   < from: CT Angio Chest PE Protocol w/ IV Cont (10.14.24 @ 01:08) >  Mixing artifact in the main pulmonary arteries degrading evaluation. No   evidence of acute pulmonary embolism within the confines of this exam.    Patchy groundglass opacities in both lungs similar to that seen on prior   exam. Nonspecific finding could represent pulmonary edema or may be   infectious/inflammatory.    Dilated pulmonary arteries in keeping with patient's known pulmonary   hypertension.    < end of copied text >    < from: POCUS ED TTE 2D F/U, Limited w/o Cont. (10.14.24 @ 04:28) >  IMPRESSION:  Trace pericardial effusion with no sonographic signs of tamponade.  Severe right ventricular dilation.    < end of copied text >      < from: TTE W or WO Ultrasound Enhancing Agent (10.14.24 @ 17:00) >  CONCLUSIONS:      1. Technically difficult image quality.   2. Left ventricular cavity is small. The interventricular septum is flattened in systole and diastole consistent with right ventricular pressure and volume overload. Left ventricular systolic function is hyperdynamic with an ejection fraction visually estimated at > 70 %. There is poor visualization of the endocardial borders to determine the presence of wall motion abnormalities.   3. The right ventricle is not well visualized. enlarged right ventricular cavity size and reduced right ventricular systolic function.   4. Mild to moderate pulmonic regurgitation.   5. Pulmonary artery systolic pressure could not be estimated.   6. Compared to the transthoracic echocardiogram performed on 2024, the right ventricle was not as well visualized on this study. There is again echocardiographic evidence of right ventricular failure. Quantification of severity is limited.    < end of copied text >     Internal Medicine Progress Note    Pt is a 44 year old woman with a history of pHTN (group I and IV on Rimodulin and Xarelto, on home 2L O2), c/b R heart failure, history of RA thrombus s/p thrombectomy, history of DVT/PE (2016), multiple abdominal wall abscesses (s/p I&D), asthma, bradycardia s/p dual chamber PPM. She presents with worsening palpitations and SOB on exertion.      OVERNIGHT EVENTS/INTERVAL HPI:  Pt seen at bedside. No overnight events. Pt states her SOB has improved. Pt reports that she feels mild palpitation this morning, but no epigastric pain.  Pt states that her epigastric pain and chest palpitations has improved since the rapid the prior day.  Pt continues to report diffuse headache with nausea. She denies any vision changes and photophobia. Pt states that the sumatriptan yesterday afternoon did not help. Pt states the pain revolved with Dilaudid two days prior. Pt denies any vomiting, diarrhea, dysuria, or chest pain.       OBJECTIVE:  ICU Vital Signs Last 24 Hrs  T(C): 36.4 (15 Oct 2024 11:15), Max: 36.7 (14 Oct 2024 19:42)  T(F): 97.5 (15 Oct 2024 11:15), Max: 98 (14 Oct 2024 19:42)  HR: 104 (15 Oct 2024 12:01) (91 - 104)  BP: 141/95 (15 Oct 2024 12:01) (105/71 - 141/95)  BP(mean): 97 (15 Oct 2024 11:15) (89 - 97)  ABP: --  ABP(mean): --  RR: 18 (15 Oct 2024 11:15) (18 - 18)  SpO2: 98% (15 Oct 2024 11:15) (94% - 98%)    O2 Parameters below as of 15 Oct 2024 11:15  Patient On (Oxygen Delivery Method): nasal cannula  O2 Flow (L/min): 2    I&O's Detail    Physical Exam:  GENERAL: Awake, alert and interactive, increased work of breathing, appears comfortable  NEURO: A&Ox4, no focal deficits, 5/5 strength in all ext, reflexes 2+ throughout, CN 2-12 intact  HEENT: Normocephalic, atraumatic, no conjunctivitis or scleral icterus, oral mucosa moist, no oral lesions noted  NECK: Supple, no LAD, no JVD, thyroid not palpable  CARDIAC: Tachycardic rate, Regular rhythm, +S1/S2, no murmurs/rubs/gallops  PULM: +Increased work of breath, clear to auscultation bilaterally, +Expiratory wheeze bilaterally, No rales/rhonchi  ABDOMEN: Soft, +epigastric tenderness to deep palpation, nondistended, +bs, no hepatosplenomegaly, no rebound tenderness or fluid wave, no CVA tenderness  : Deferred  MSK: Range of motion grossly intact, no back tenderness  SKIN: Warm and dry, no rashes, lesions  VASC: Cap refil < 2 sec, 2+ peripheral pulses, no edema, no LE tenderness  Psych: Appropriate affect      MEDICATIONS  (STANDING):  buDESOnide    Inhalation Suspension 0.5 milliGRAM(s) Inhalation two times a day  influenza   Vaccine 0.5 milliLiter(s) IntraMuscular once  ipratropium    for Nebulization 500 MICROGram(s) Nebulizer every 6 hours  montelukast 10 milliGRAM(s) Oral daily  pantoprazole    Tablet 40 milliGRAM(s) Oral before breakfast  predniSONE   Tablet 10 milliGRAM(s) Oral daily  rivaroxaban 20 milliGRAM(s) Oral with dinner  sildenafil (REVATIO) 20 milliGRAM(s) Oral <User Schedule>  spironolactone 50 milliGRAM(s) Oral daily  sucralfate 1 Gram(s) Oral two times a day  Treprostinil  SubQ continuous infusion 48 NANOGram(s)/kG/Min 0.052 mL/Hr (0.05 mL/Hr) IV Continuous <Continuous>    MEDICATIONS  (PRN):  acetaminophen     Tablet .. 650 milliGRAM(s) Oral every 6 hours PRN Temp greater or equal to 38C (100.4F), Mild Pain (1 - 3)  aluminum hydroxide/magnesium hydroxide/simethicone Suspension 30 milliLiter(s) Oral every 4 hours PRN Dyspepsia        Labs:                        14.8   11.88 )-----------( 373      ( 15 Oct 2024 06:38 )             48.2                10-15    133[L]  |  97  |  18  ----------------------------<  95  3.6   |  19[L]  |  1.09    Ca    8.9      15 Oct 2024 06:39  Phos  3.5     10-15  Mg     1.8     10-15    TPro  6.6  /  Alb  3.7  /  TBili  1.0  /  DBili  x   /  AST  13  /  ALT  10  /  AlkPhos  62  10-13      PT/INR - ( 13 Oct 2024 23:16 )   PT: 27.5 sec;   INR: 2.41 ratio    PTT - ( 13 Oct 2024 23:16 )  PTT:53.4 sec    Pro-Brain Natriuretic Peptide: 1861 pg/mL (10.13.24 @ 23:16)     Urinalysis Basic - ( 14 Oct 2024 02:48 )    Color: Yellow / Appearance: Clear / S.023 / pH: x  Gluc: x / Ketone: Negative mg/dL  / Bili: Negative / Urobili: 2.0 mg/dL   Blood: x / Protein: Negative mg/dL / Nitrite: Negative   Leuk Esterase: Negative / RBC: x / WBC x   Sq Epi: x / Non Sq Epi: x / Bacteria: x          Radiology:   < from: CT Angio Chest PE Protocol w/ IV Cont (10.14.24 @ 01:08) >  Mixing artifact in the main pulmonary arteries degrading evaluation. No   evidence of acute pulmonary embolism within the confines of this exam.    Patchy groundglass opacities in both lungs similar to that seen on prior   exam. Nonspecific finding could represent pulmonary edema or may be   infectious/inflammatory.    Dilated pulmonary arteries in keeping with patient's known pulmonary   hypertension.    < end of copied text >    < from: POCUS ED TTE 2D F/U, Limited w/o Cont. (10.14.24 @ 04:28) >  IMPRESSION:  Trace pericardial effusion with no sonographic signs of tamponade.  Severe right ventricular dilation.    < end of copied text >      < from: TTE W or WO Ultrasound Enhancing Agent (10.14.24 @ 17:00) >  CONCLUSIONS:      1. Technically difficult image quality.   2. Left ventricular cavity is small. The interventricular septum is flattened in systole and diastole consistent with right ventricular pressure and volume overload. Left ventricular systolic function is hyperdynamic with an ejection fraction visually estimated at > 70 %. There is poor visualization of the endocardial borders to determine the presence of wall motion abnormalities.   3. The right ventricle is not well visualized. enlarged right ventricular cavity size and reduced right ventricular systolic function.   4. Mild to moderate pulmonic regurgitation.   5. Pulmonary artery systolic pressure could not be estimated.   6. Compared to the transthoracic echocardiogram performed on 2024, the right ventricle was not as well visualized on this study. There is again echocardiographic evidence of right ventricular failure. Quantification of severity is limited.    < end of copied text >     Internal Medicine Progress Note    Pt is a 44 year old woman with a history of pHTN (group I and IV on Rimodulin and Xarelto, on home 2L O2), c/b R heart failure, history of RA thrombus s/p thrombectomy, history of DVT/PE (2016), multiple abdominal wall abscesses (s/p I&D), asthma, bradycardia s/p dual chamber PPM. She presents with worsening palpitations and SOB on exertion.      OVERNIGHT EVENTS/INTERVAL HPI:  Pt seen at bedside. No overnight events. Pt states her SOB has improved. Pt reports that she feels mild palpitation this morning, but no epigastric pain.  Pt states that her epigastric pain and chest palpitations has improved since the rapid the prior day.  Pt continues to report diffuse 7/10 throbbing headache with nausea. She denies any vision changes and photophobia. Pt states that the sumatriptan yesterday afternoon did not help, but dilaudid helped. She requests regular diet. Pt denies any vomiting, diarrhea, dysuria, or chest pain.       OBJECTIVE:  Vital Signs Last 24 Hrs  T(C): 36.8 (16 Oct 2024 11:00), Max: 36.8 (16 Oct 2024 05:51)  T(F): 98.3 (16 Oct 2024 11:00), Max: 98.3 (16 Oct 2024 11:00)  HR: 91 (16 Oct 2024 11:00) (91 - 108)  BP: 100/69 (16 Oct 2024 11:00) (100/69 - 134/79)  BP(mean): 100 (15 Oct 2024 16:37) (100 - 100)  RR: 18 (16 Oct 2024 11:00) (18 - 18)  SpO2: 97% (16 Oct 2024 11:00) (93% - 99%)    Parameters below as of 16 Oct 2024 11:00  Patient On (Oxygen Delivery Method): nasal cannula    I&O's Detail    15 Oct 2024 07:01  -  16 Oct 2024 07:00  --------------------------------------------------------  IN:    freetext medication - Infusion: 0.5 mL    Oral Fluid: 1200 mL  Total IN: 1200.5 mL    OUT:    Voided (mL): 1500 mL  Total OUT: 1500 mL    Total NET: -299.5 mL      16 Oct 2024 07:01  -  16 Oct 2024 13:53  --------------------------------------------------------  IN:    freetext medication - Infusion: 0.2 mL    Oral Fluid: 620 mL  Total IN: 620.2 mL    OUT:  Total OUT: 0 mL    Total NET: 620.2 mL        Physical Exam:  GENERAL: Awake, alert and interactive, increased work of breathing, appears comfortable  NEURO: A&Ox4, no focal deficits, 5/5 strength in all ext, reflexes 2+ throughout, CN 2-12 intact  HEENT: Normocephalic, atraumatic, no conjunctivitis or scleral icterus, oral mucosa moist, no oral lesions noted  NECK: Supple, no LAD, no JVD, thyroid not palpable  CARDIAC: Tachycardic rate, Regular rhythm, +S1/S2, no murmurs/rubs/gallops  PULM: +Increased work of breath, clear to auscultation bilaterally, +Expiratory wheeze bilaterally, No rales/rhonchi  ABDOMEN: Soft, +epigastric tenderness to deep palpation, nondistended, +bs, no hepatosplenomegaly, no rebound tenderness or fluid wave, no CVA tenderness  MSK: Range of motion grossly intact, no back tenderness  SKIN: Warm and dry, no rashes, lesions  VASC: Cap refil < 2 sec, 2+ peripheral pulses, no edema, no LE tenderness  Psych: Appropriate affect      MEDICATIONS  (STANDING):  buDESOnide    Inhalation Suspension 0.5 milliGRAM(s) Inhalation two times a day  influenza   Vaccine 0.5 milliLiter(s) IntraMuscular once  ipratropium    for Nebulization 500 MICROGram(s) Nebulizer every 6 hours  montelukast 10 milliGRAM(s) Oral daily  pantoprazole    Tablet 40 milliGRAM(s) Oral before breakfast  predniSONE   Tablet 10 milliGRAM(s) Oral daily  rivaroxaban 20 milliGRAM(s) Oral with dinner  sildenafil (REVATIO) 20 milliGRAM(s) Oral <User Schedule>  spironolactone 50 milliGRAM(s) Oral daily  sucralfate 1 Gram(s) Oral two times a day  Treprostinil  SubQ continuous infusion 48 NANOGram(s)/kG/Min 0.052 mL/Hr (0.05 mL/Hr) IV Continuous <Continuous>    MEDICATIONS  (PRN):  acetaminophen     Tablet .. 650 milliGRAM(s) Oral every 6 hours PRN Temp greater or equal to 38C (100.4F), Mild Pain (1 - 3)  aluminum hydroxide/magnesium hydroxide/simethicone Suspension 30 milliLiter(s) Oral every 4 hours PRN Dyspepsia        Labs:                                   14.4   11.98 )-----------( 368      ( 16 Oct 2024 09:11 )             46.6     10-16    135  |  95[L]  |  17  ----------------------------<  129[H]  3.3[L]   |  22  |  1.18    Ca    8.4      16 Oct 2024 09:11  Phos  2.6     10-16  Mg     1.6     10-16    TPro  7.2  /  Alb  4.0  /  TBili  0.8  /  DBili  x   /  AST  12  /  ALT  12  /  AlkPhos  61  10-16        Radiology:   < from: CT Angio Chest PE Protocol w/ IV Cont (10.14.24 @ 01:08) >  Mixing artifact in the main pulmonary arteries degrading evaluation. No   evidence of acute pulmonary embolism within the confines of this exam.    Patchy groundglass opacities in both lungs similar to that seen on prior   exam. Nonspecific finding could represent pulmonary edema or may be   infectious/inflammatory.    Dilated pulmonary arteries in keeping with patient's known pulmonary   hypertension.    < end of copied text >    < from: POCUS ED TTE 2D F/U, Limited w/o Cont. (10.14.24 @ 04:28) >  IMPRESSION:  Trace pericardial effusion with no sonographic signs of tamponade.  Severe right ventricular dilation.    < end of copied text >      < from: TTE W or WO Ultrasound Enhancing Agent (10.14.24 @ 17:00) >  CONCLUSIONS:      1. Technically difficult image quality.   2. Left ventricular cavity is small. The interventricular septum is flattened in systole and diastole consistent with right ventricular pressure and volume overload. Left ventricular systolic function is hyperdynamic with an ejection fraction visually estimated at > 70 %. There is poor visualization of the endocardial borders to determine the presence of wall motion abnormalities.   3. The right ventricle is not well visualized. enlarged right ventricular cavity size and reduced right ventricular systolic function.   4. Mild to moderate pulmonic regurgitation.   5. Pulmonary artery systolic pressure could not be estimated.   6. Compared to the transthoracic echocardiogram performed on 7/16/2024, the right ventricle was not as well visualized on this study. There is again echocardiographic evidence of right ventricular failure. Quantification of severity is limited.    < end of copied text >

## 2024-10-16 NOTE — PROGRESS NOTE ADULT - PROBLEM SELECTOR PLAN 3
- History of GERD, on protonix 40mg at home.   - epigastric tenderness on palpitation   - Liver Enzymes WNL  - lipase 16     Plan:  - C/w protonix   - C/w maalox and sucralfate  - CT abdomen considered - History of GERD, on protonix 40mg at home.   - epigastric tenderness on palpitation; likely related to chest pain with exertion/palpations  - Liver Enzymes WNL  - lipase 16     Plan:  - C/w protonix   - C/w maalox  - CT abdomen considered - History of GERD, on protonix 40mg at home.   - Epigastric tenderness on palpitation; likely related to chest pain with exertion/palpations  - Liver Enzymes WNL  - lipase 16     Plan:  - C/w protonix   - C/w maalox  - CT abdomen considered

## 2024-10-16 NOTE — PROGRESS NOTE ADULT - PROBLEM SELECTOR PLAN 5
- Pt reports migraine headaches starting on 10/14.  - Pt concerned that headache caused by different brand of sildenafil than her home regiment. Pt instructed to bring home medication to hospital.     Plan:  - S/p dilaudid prn  - Start Sumatriptan 50mg PO and Esgic   - Reassess pt headache after sumatriptan - Pt reports migraine headaches starting on 10/14.  - Pt concerned that headache caused by different brand of sildenafil than her home regiment. Pt instructed to bring home medication to hospital.   -s/p Sumatriptan 50mg PO and Esgic with no resolution of symptoms    Plan:  - Dilaudid prn  - begin use of patient's sildenafil once obtained from home - Pt reports migraine headaches starting on 10/14.  - Pt concerned that headache caused by different brand of sildenafil than her home regiment. Pt instructed to bring home medication to hospital.   -s/p Sumatriptan 50mg PO and Esgic with no resolution of symptoms    Plan:  - Dilaudid prn  - Begin use of patient's sildenafil once obtained from home

## 2024-10-16 NOTE — CONSULT NOTE ADULT - SUBJECTIVE AND OBJECTIVE BOX
Patient is a 44y old  Female who presents with a chief complaint of palpitation, worsening SOUZA and chest tightness (16 Oct 2024 19:19)    HPI:  Briefly, 44F h/o PE on Xarelto, pulmonary HTN (likely group I and group IV; on remodulin/sildenafil), ILD (on home O2), UJLIA, obesity, prior AVNRT s/p ablation (), bradycardia s/p dual chamber PPM, prior RA clot s/p aspiration () who presented on 10/14 with palpitations and SOB. Has had recurrent admissions for palpitations at other hospitals without clear etiology. PPM interrogated without events noted. Received IV bumex since admission. Had RRT 10/15 for SOB/palpitations without notable event on tele. Previously given digoxin but reports hallucinations to this so discontinued. Was recently placed on torsemide due to poor response to lasix.  presents to the hospital for 2 days of palpitations and abdominal pain/nausea/emesis. Denies any inciting event. Noted to have labile hypotension and labs notable for worsening transaminitis, BNP 9k (much higher than prior) as well as acute kidney injury. Feels improved since initiation of /Marcela. Found to have RA clot on repeat TTE and underwent CTA which did not reveal any PE but went for aspiration and had partial throbus removal and stopped as appeared more chronic. Changed to heparin gtt. On exam, JVP normal, prominent P2, tachycardic, CTAB, nontender abdomen, warm extremities. Labs reviewed - improving LFTs and renal function normalized. TTE done which showed hyperdynamic and compressed LV, severe RV dysfunction, mod TR, dilated IVC. Repeat TTE with improved RV function however RA thrombus noted. Overall stage D, NYHA class IV with severe pulmonary HTN.      PAST MEDICAL & SURGICAL HISTORY:  Tachycardia      Anemia      Pulmonary hypertension      Pulmonary embolism      Asthma  On home O2 nightly at 2L via NC      PE (pulmonary thromboembolism)  on Xarelto 10 mg daily      Sinusitis      Corneal disorder      Right heart failure      CAD (coronary artery disease)      H/O pulmonary hypertension      Pulmonary embolism      Asthma      History of tachycardia      On supplemental oxygen by nasal cannula  O2 @ 2L      Pacemaker      Skin mass  left upper thigh mass      History of tubal ligation      Pacemaker      H/O tubal ligation      History of pacemaker   - [employer] model Ingevity 4454          FAMILY HISTORY:  Family history of diabetes mellitus  in brother    Family history of diabetes mellitus in mother    FH: anemia        Home Medications:  Atrovent 18 mcg/inh inhalation aerosol: 1 inhaled 4 times a day as needed for  shortness of breath and/or wheezing (14 Oct 2024 05:41)  omeprazole 40 mg oral delayed release capsule: 1 cap(s) orally once a day (14 Oct 2024 05:42)  predniSONE 10 mg oral tablet: 1 tab(s) orally once a day (14 Oct 2024 05:42)  Remodulin 5 mg/mL injectable solution: 1 application injectable once a day patient is taking 48ng/kg/min via her own SQ PUMP (14 Oct 2024 05:42)  rivaroxaban 20 mg oral tablet: 1 tab(s) orally once a day (before a meal) (14 Oct 2024 05:42)  sildenafil 20 mg oral tablet: 1 tab(s) orally every 8 hours (14 Oct 2024 05:42)  spironolactone 25 mg oral tablet: 2 tab(s) orally once a day (14 Oct 2024 05:42)      Medications:  acetaminophen     Tablet .. 650 milliGRAM(s) Oral every 6 hours PRN  acetaminophen 325 mG/butalbital 50 mG/caffeine 40 mG 1 Tablet(s) Oral every 8 hours PRN  aluminum hydroxide/magnesium hydroxide/simethicone Suspension 30 milliLiter(s) Oral every 4 hours PRN  buDESOnide    Inhalation Suspension 0.5 milliGRAM(s) Inhalation two times a day  buMETAnide 1 milliGRAM(s) Oral daily  fluticasone propionate 50 MICROgram(s)/spray Nasal Spray 1 Spray(s) Both Nostrils two times a day  HYDROmorphone  Injectable 0.25 milliGRAM(s) IV Push every 12 hours PRN  influenza   Vaccine 0.5 milliLiter(s) IntraMuscular once  ipratropium    for Nebulization 500 MICROGram(s) Nebulizer every 6 hours  montelukast 10 milliGRAM(s) Oral daily  pantoprazole    Tablet 40 milliGRAM(s) Oral before breakfast  predniSONE   Tablet 10 milliGRAM(s) Oral daily  sildenafil (REVATIO) 20 milliGRAM(s) Oral <User Schedule>  spironolactone 50 milliGRAM(s) Oral two times a day  SUMAtriptan 50 milliGRAM(s) Oral every 12 hours PRN  Treprostinil SubQ Continous Infusion 51 NANOGram(s)/kG/Min 0.056 mL/Hr IV Continuous <Continuous>      Review of systems:  10 point review of systems completed and are negative unless noted in HPI    Vitals:  T(C): 36.8 (10-16-24 @ 11:00), Max: 36.8 (10-16-24 @ 05:51)  HR: 109 (10-16-24 @ 16:18) (91 - 109)  BP: 132/90 (10-16-24 @ 16:18) (100/69 - 134/79)  BP(mean): 104 (10-16-24 @ 16:18) (104 - 104)  RR: 18 (10-16-24 @ 11:00) (18 - 18)  SpO2: 97% (10-16-24 @ 11:00) (93% - 99%)    Daily     Daily Weight in k (16 Oct 2024 05:51)        I&O's Summary    15 Oct 2024 07:  -  16 Oct 2024 07:00  --------------------------------------------------------  IN: 1200.5 mL / OUT: 1500 mL / NET: -299.5 mL    16 Oct 2024 07:  -  16 Oct 2024 19:30  --------------------------------------------------------  IN: 860.6 mL / OUT: 800 mL / NET: 60.6 mL        Physical Exam:  Appearance: No Acute Distress  HEENT: PERRL  Neck:   Cardiovascular: Normal S1 S2, No murmurs/rubs/gallops  Respiratory: Clear to auscultation bilaterally  Gastrointestinal: Soft, Non-tender	  Skin: No cyanosis	  Neurologic: Non-focal  Extremities: No LE edema  Psychiatry: A & O x 3, Mood & affect appropriate    Labs:                        14.4   11.98 )-----------( 368      ( 16 Oct 2024 09:11 )             46.6     10-16    135  |  95[L]  |  17  ----------------------------<  129[H]  3.3[L]   |  22  |  1.18    Ca    8.4      16 Oct 2024 09:11  Phos  2.6     10-16  Mg     1.6     10-16    TPro  7.2  /  Alb  4.0  /  TBili  0.8  /  DBili  x   /  AST  12  /  ALT  12  /  AlkPhos  61  10-16              TELEMETRY:    Echocardiogram:    EKG: Patient is a 44y old  Female who presents with a chief complaint of palpitation, worsening SOUZA and chest tightness (16 Oct 2024 19:19)    HPI:  Briefly, 44F h/o PE on Xarelto, pulmonary HTN (likely group I and group IV; on remodulin/sildenafil), 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 Has had recurrent admissions for palpitations at other hospitals with prior evidence of SVT. PPM interrogated without events noted. Received IV bumex since admission. Had RRT 10/15 for SOB/palpitations without notable event on tele. Previously given digoxin but reports hallucinations to this so discontinued. Was recently placed on torsemide due to poor response to lasix. Of note, had prior cardiogenic shock in . Over past year, Remodulin was uptitrated. TTE in 2024 noted severe pulmonary HTN and RV dilatation. Reports continued dypsnea with exertion. TTE 10/14 obtained with nl LV function, mod-severe RV dysfunction, mod TR, PASP approx 40-50, LVOT VTI 19 cm, IVC 1.4 cm. Was being evaluated for lung transplant but required to lose weight first.       PAST MEDICAL & SURGICAL HISTORY:  Tachycardia    Anemia      Pulmonary hypertension      Pulmonary embolism      Asthma  On home O2 nightly at 2L via NC      PE (pulmonary thromboembolism)  on Xarelto 10 mg daily      Sinusitis      Corneal disorder      Right heart failure      CAD (coronary artery disease)      H/O pulmonary hypertension      Pulmonary embolism      Asthma      History of tachycardia      On supplemental oxygen by nasal cannula  O2 @ 2L      Pacemaker      Skin mass  left upper thigh mass      History of tubal ligation      Pacemaker      H/O tubal ligation      History of pacemaker   - [employer] model Ingevity 4469          FAMILY HISTORY:  Family history of diabetes mellitus  in brother    Family history of diabetes mellitus in mother    FH: anemia        Home Medications:  Atrovent 18 mcg/inh inhalation aerosol: 1 inhaled 4 times a day as needed for  shortness of breath and/or wheezing (14 Oct 2024 05:41)  omeprazole 40 mg oral delayed release capsule: 1 cap(s) orally once a day (14 Oct 2024 05:42)  predniSONE 10 mg oral tablet: 1 tab(s) orally once a day (14 Oct 2024 05:42)  Remodulin 5 mg/mL injectable solution: 1 application injectable once a day patient is taking 48ng/kg/min via her own SQ PUMP (14 Oct 2024 05:42)  rivaroxaban 20 mg oral tablet: 1 tab(s) orally once a day (before a meal) (14 Oct 2024 05:42)  sildenafil 20 mg oral tablet: 1 tab(s) orally every 8 hours (14 Oct 2024 05:42)  spironolactone 25 mg oral tablet: 2 tab(s) orally once a day (14 Oct 2024 05:42)      Medications:  acetaminophen     Tablet .. 650 milliGRAM(s) Oral every 6 hours PRN  acetaminophen 325 mG/butalbital 50 mG/caffeine 40 mG 1 Tablet(s) Oral every 8 hours PRN  aluminum hydroxide/magnesium hydroxide/simethicone Suspension 30 milliLiter(s) Oral every 4 hours PRN  buDESOnide    Inhalation Suspension 0.5 milliGRAM(s) Inhalation two times a day  buMETAnide 1 milliGRAM(s) Oral daily  fluticasone propionate 50 MICROgram(s)/spray Nasal Spray 1 Spray(s) Both Nostrils two times a day  HYDROmorphone  Injectable 0.25 milliGRAM(s) IV Push every 12 hours PRN  influenza   Vaccine 0.5 milliLiter(s) IntraMuscular once  ipratropium    for Nebulization 500 MICROGram(s) Nebulizer every 6 hours  montelukast 10 milliGRAM(s) Oral daily  pantoprazole    Tablet 40 milliGRAM(s) Oral before breakfast  predniSONE   Tablet 10 milliGRAM(s) Oral daily  sildenafil (REVATIO) 20 milliGRAM(s) Oral <User Schedule>  spironolactone 50 milliGRAM(s) Oral two times a day  SUMAtriptan 50 milliGRAM(s) Oral every 12 hours PRN  Treprostinil SubQ Continous Infusion 51 NANOGram(s)/kG/Min 0.056 mL/Hr IV Continuous <Continuous>      Review of systems:  10 point review of systems completed and are negative unless noted in HPI    Vitals:  T(C): 36.8 (10-16-24 @ 11:00), Max: 36.8 (10-16-24 @ 05:51)  HR: 109 (10-16-24 @ 16:18) (91 - 109)  BP: 132/90 (10-16-24 @ 16:18) (100/69 - 134/79)  BP(mean): 104 (10-16-24 @ 16:18) (104 - 104)  RR: 18 (10-16-24 @ 11:00) (18 - 18)  SpO2: 97% (10-16-24 @ 11:00) (93% - 99%)    Daily     Daily Weight in k (16 Oct 2024 05:51)        I&O's Summary    15 Oct 2024 07:  -  16 Oct 2024 07:00  --------------------------------------------------------  IN: 1200.5 mL / OUT: 1500 mL / NET: -299.5 mL    16 Oct 2024 07:01  -  16 Oct 2024 19:30  --------------------------------------------------------  IN: 860.6 mL / OUT: 800 mL / NET: 60.6 mL        Physical Exam:  Appearance: No Acute Distress  HEENT: PERRL  Neck: JVP approx 6-8 w/o HJR  Cardiovascular: Normal S1 S2, No murmurs/rubs/gallops; prominent P2  Respiratory: Clear to auscultation bilaterally  Gastrointestinal: Soft, Non-tender	  Skin: No cyanosis	  Neurologic: Non-focal  Extremities: No LE edema  Psychiatry: A & O x 3, Mood & affect appropriate    Labs:                        14.4   11.98 )-----------( 368      ( 16 Oct 2024 09:11 )             46.6     10-16    135  |  95[L]  |  17  ----------------------------<  129[H]  3.3[L]   |  22  |  1.18    Ca    8.4      16 Oct 2024 09:11  Phos  2.6     10-16  Mg     1.6     10-16    TPro  7.2  /  Alb  4.0  /  TBili  0.8  /  DBili  x   /  AST  12  /  ALT  12  /  AlkPhos  61  10-

## 2024-10-16 NOTE — PROGRESS NOTE ADULT - PROBLEM SELECTOR PLAN 7
History of asthma, on Atrovent PRN outpatient, was on Prednisone 10mg QD for pHTN    Plan:  - Pulbritton aguero appreciated  - Will give Atrovent nebulizer q6h, patient refused albuterol stating it makes her heart race  - Will c/w Prednisone 10mg  - Start Singulair 10mg PO  - Start Budesonide 0.5mg inhaled BID

## 2024-10-16 NOTE — PROGRESS NOTE ADULT - ASSESSMENT
43 yo F w/ PMHx PHTN (group I and IV) currently on remodulin and xarelto, chronic hypoxemic respiratory failure on 2L home O2, RA thrombus s/p thrombectomy, Asthma (Pred 10mg), prior AVRNT s/p ablation, bradycardia s/p dual chamber PPM, and hx of abdominal wall abscesses presenting for increased dyspnea and palpitations with HR into 150s. CTPA negative for PE but showing bilateral GGOs. ED POCUS with IVC >2cm, resp variation noted, severe RV dilation. BNP elevated to 1800, prev 800 on recent admission.  No wheezing on exam.  Overall findings consistent with HF exacerbation. Echo with RV dilation, mildly reduced RV function. EP investigated PPM but no arrhythmias noted?    Recommend:  - Continue Remodulin, increased 10/15/24 to 51ng/kg/min (based on 92kg weight), currently running at 0.056ml/hr  - Continue Sildenafil 20mg TID  - please continue with Ipratropium neb q6h  - continue Budesonide 0.5mg BID  - D/w Cardiology, possible that patient is closer to euvolemia despite elevated weight, will STOP Bumex  - Restart Torsemide 20mg  - daily standing weights, strict Is/Os, monitor BUN/Cr  - Aldactone at 50mg  - supplemental O2 as needed for sat >90, currently on 2L NC  - Prednisone 10mg, barring any swelling w/ increased Remodulin dose will try to continue to taper off during admission  - hold Xarelto in anticipation of RHC/LHC  - Cardiology to discuss decreasing HR sensitivity threshold on PPM  - RHC/LHC planned for 10/18/24    Discussed with Dr. Yoon

## 2024-10-16 NOTE — PROGRESS NOTE ADULT - PROBLEM SELECTOR PLAN 4
-WBC 13 on admission  - CT (10/13): patchy GGO unchanged from prior  - afebrile, tachycardic   - UA negative  - CT with unchanged patchy GGO  - No localizing symptoms besides SOB which may be attributable to pHTN; No fever/cough  - Full RVP negative  - Likely due to chronic steroid use, infectious etiology less likely  - BCx NGTD    Plan:  - Will monitor off abx for now, low threshold to start -WBC 13 on admission  - CT (10/13): patchy GGO unchanged from prior  - afebrile, tachycardic   - UA negative  - No localizing symptoms besides SOB which may be attributable to pHTN; No fever/cough  - Full RVP negative  - Likely due to chronic steroid use, infectious etiology less likely  - BCx NGTD    Plan:  - Will monitor off abx for now, low threshold to start WBC 13 on admission, now improving to 11.98. Likely due to chronic steroid use, infectious etiology less likely.  - CT (10/13): patchy GGO unchanged from prior  - afebrile, tachycardic   - UA negative  - No localizing symptoms besides SOB which may be attributable to pHTN; No fever/cough  - Full RVP negative  - BCx NGTD    Plan:  - Will monitor off abx for now, low threshold to start

## 2024-10-16 NOTE — CONSULT NOTE ADULT - ASSESSMENT
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 Since arrival, received IV bumex for concern of RV overload. CTA done with dilated PA but negative for PE and patchy b/l GGO. PPM interrogated without events noted. TTE 10/14 obtained with nl LV function, mod-severe RV dysfunction, RV pressure/volume overload, mod TR, PASP approx 40-50, LVOT VTI 19 cm, IVC 1.4 cm. Was being evaluated for lung transplant but required to lose weight first. Currently WHO functional class III with group I/IV PH and appears euvolemic. Unclear etiology of palpitations but possibly d/t underlying disorder and is sinus tach but would also ensure no SVT.  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 Since arrival, received IV bumex for concern of RV overload. CTA done with dilated PA but negative for PE and patchy b/l GGO. PPM interrogated without events noted. TTE 10/14 obtained with nl LV function, mod-severe RV dysfunction, RV pressure/volume overload, mod TR, PASP approx 40-50, LVOT VTI 19 cm, IVC 1.4 cm. Was being evaluated for lung transplant but required to lose weight first. Currently WHO functional class III with group I/IV PH and appears euvolemic. Unclear etiology of palpitations but possibly d/t underlying disorder and is sinus tach but would also ensure no SVT.     Pertinent studies:  10/14/24 TTE - nl EF, mod TR, mod-severe RV dysfunction, LVOT VTI 19 cm, IVC 1.4 cm  10/14/24 - CTA - no PE; dilated PA; b/l patchy GGO  7/17/24 - ECG - sinus, biatrial enlargement, RVH, R axis deviation, RV strain  8/16/23 - RA 3, RV 81/3, PA 79/43/57, PCWP 36, LVEDP not obtained; CO/CI 4.3/2.01  7/25/14 - mRCA 40%; /85, RA 13, /27, /52/77, LVEDP 10; Marcela PA 58/30/42 with improvement CI from 1.54 to 2.33  6/2/15 - V/Q scan - low probability of PE

## 2024-10-16 NOTE — CONSULT NOTE ADULT - PROBLEM SELECTOR RECOMMENDATION 2
prior RA clot s/p aspiration 2022  on Xarelto; d/c for now and raulitoh to hep gtt for plan for cath as above

## 2024-10-16 NOTE — CONSULT NOTE ADULT - PROBLEM SELECTOR RECOMMENDATION 9
prior RHC in 2014 with notable + vasodilator response with Marcela  c/w Remodulin and sildenafil as per pulm  would reduce bumex to 1 mg PO daily  increase jacqueline to 50 mg twice/day  will repeat RHC/LVEDP on Friday with Marcela study

## 2024-10-16 NOTE — CONSULT NOTE ADULT - SUBJECTIVE AND OBJECTIVE BOX
CHIEF COMPLAINT:  palpitation, worsening SOUZA and chest tightness    HISTORY OF PRESENT ILLNESS:  44 year old woman with a history of pHTN (group I and IV on Rimodulin and Xarelto, on home 2L O2), c/b R heart failure, history of RA thrombus s/p thrombectomy, history of DVT/PE (2016), multiple abdominal wall abscesses (s/p I&D), asthma, bradycardia s/p dual chamber PPM. Patient presents with 2 days gradual onset palpitations and SOB on exertion, associated with midsternal chest tightness that is non-radiating. ROS additionally positive for epigastric pain. She states she usually had all these symptoms in the past for pHTN and asthma exacerbation. She endorse some baseline cough and sputum production but states it is not worse than usual. She denies fever, chills, dysuria, URI symptoms.    ED: T98.5,  -> 104, BP 120s/70s, 99% 4L. Labs notable for WBC 13.08, BNP 1861 (previously 840 7/2024). CT angio w/ patchy GGO (unchanged from previous).      Allergies    adhesives (Rash)  vancomycin (Rash)  penicillin (Rash)    Intolerances    	  MEDICATIONS:  buMETAnide 1 milliGRAM(s) Oral daily  sildenafil (REVATIO) 20 milliGRAM(s) Oral <User Schedule>  spironolactone 50 milliGRAM(s) Oral two times a day  buDESOnide   Inhalation Suspension 0.5 milliGRAM(s) Inhalation two times a day  ipratropium   for Nebulization 500 MICROGram(s) Nebulizer every 6 hours  montelukast 10 milliGRAM(s) Oral daily  acetaminophen     Tablet .. 650 milliGRAM(s) Oral every 6 hours PRN  acetaminophen 325 mG/butalbital 50 mG/caffeine 40 mG 1 Tablet(s) Oral every 8 hours PRN  HYDROmorphone  Injectable 0.25 milliGRAM(s) IV Push every 12 hours PRN  SUMAtriptan 50 milliGRAM(s) Oral every 12 hours PRN  aluminum hydroxide/magnesium hydroxide/simethicone Suspension 30 milliLiter(s) Oral every 4 hours PRN  pantoprazole    Tablet 40 milliGRAM(s) Oral before breakfast  predniSONE   Tablet 10 milliGRAM(s) Oral daily  fluticasone propionate 50 MICROgram(s)/spray Nasal Spray 1 Spray(s) Both Nostrils two times a day  influenza   Vaccine 0.5 milliLiter(s) IntraMuscular once      PAST MEDICAL & SURGICAL HISTORY:  Tachycardia    Anemia    Pulmonary hypertension    Pulmonary embolism    Asthma  On home O2 nightly at 2L via NC    PE (pulmonary thromboembolism)  on Xarelto 10 mg daily    Sinusitis    Corneal disorder    Right heart failure    CAD (coronary artery disease)    H/O pulmonary hypertension    Pulmonary embolism    Asthma    History of tachycardia    On supplemental oxygen by nasal cannula  O2 @ 2L    Pacemaker    Skin mass  left upper thigh mass    History of tubal ligation    Pacemaker    H/O tubal ligation      History of pacemaker  12/19 - [employer] model [employer] 4469      FAMILY HISTORY:  Family history of diabetes mellitus  in brother    Family history of diabetes mellitus in mother    FH: anemia      SOCIAL HISTORY:    [ ] Non-smoker  [ ] Smoker  [ ] Alcohol      REVIEW OF SYSTEMS:  See HPI. Otherwise, 12 point ROS done and otherwise negative.    PHYSICAL EXAM:  T(C): 36.8 (10-16-24 @ 11:00), Max: 36.8 (10-16-24 @ 05:51)  HR: 109 (10-16-24 @ 16:18) (91 - 109)  BP: 132/90 (10-16-24 @ 16:18) (100/69 - 134/79)  RR: 18 (10-16-24 @ 11:00) (18 - 18)  SpO2: 97% (10-16-24 @ 11:00) (93% - 99%)  Wt(kg): --  I&O's Summary    15 Oct 2024 07:01  -  16 Oct 2024 07:00  --------------------------------------------------------  IN: 1200.5 mL / OUT: 1500 mL / NET: -299.5 mL    16 Oct 2024 07:01  -  16 Oct 2024 17:16  --------------------------------------------------------  IN: 860.6 mL / OUT: 800 mL / NET: 60.6 mL        Appearance: Normal	  HEENT:   Normal oral mucosa, PERRL, EOMI	  Lymphatic: No lymphadenopathy  Cardiovascular: Normal S1 S2, No JVD, No murmurs, No edema  Respiratory: Lungs clear to auscultation	  Psychiatry: A & O x 3, Mood & affect appropriate  Gastrointestinal:  Soft, Non-tender, + BS	  Skin: No rashes, No ecchymoses, No cyanosis	  Neurologic: Non-focal  Extremities: Normal range of motion, No clubbing, cyanosis or edema  Vascular: Peripheral pulses palpable 2+ bilaterally        LABS:	 	    CBC Full  -  ( 16 Oct 2024 09:11 )  WBC Count : 11.98 K/uL  Hemoglobin : 14.4 g/dL  Hematocrit : 46.6 %  Platelet Count - Automated : 368 K/uL  Mean Cell Volume : 68.5 fl  Mean Cell Hemoglobin : 21.2 pg  Mean Cell Hemoglobin Concentration : 30.9 gm/dL  Auto Neutrophil # : 8.63 K/uL  Auto Lymphocyte # : 2.06 K/uL  Auto Monocyte # : 1.01 K/uL  Auto Eosinophil # : 0.17 K/uL  Auto Basophil # : 0.07 K/uL  Auto Neutrophil % : 72.1 %  Auto Lymphocyte % : 17.2 %  Auto Monocyte % : 8.4 %  Auto Eosinophil % : 1.4 %  Auto Basophil % : 0.6 %    10-16    135  |  95[L]  |  17  ----------------------------<  129[H]  3.3[L]   |  22  |  1.18  10-15    135  |  98  |  19  ----------------------------<  126[H]  4.1   |  22  |  1.21    Ca    8.4      16 Oct 2024 09:11  Ca    8.9      15 Oct 2024 12:39  Phos  2.6     10-16  Phos  3.2     10-15  Mg     1.6     10-16  Mg     1.7     10-15    TPro  7.2  /  Alb  4.0  /  TBili  0.8  /  DBili  x   /  AST  12  /  ALT  12  /  AlkPhos  61  10-16      proBNP:   Lipid Profile:   HgA1c:   TSH:       CARDIAC MARKERS:                TELEMETRY: 	    ECG:  	  RADIOLOGY:  OTHER: 	    PREVIOUS DIAGNOSTIC TESTING:    [ ] Echocardiogram:  [ ]  Catheterization:  [ ] Stress Test:  	  	  ASSESSMENT/PLAN: 	     CHIEF COMPLAINT:  palpitation, worsening SOUZA and chest tightness    HISTORY OF PRESENT ILLNESS:  44 year old woman with a history of pHTN (group I and IV on Rimodulin and Xarelto, on home 2L O2), c/b R heart failure, history of RA thrombus s/p thrombectomy, history of DVT/PE (2016), multiple abdominal wall abscesses (s/p I&D), asthma, bradycardia s/p dual chamber PPM. Patient presents with 2 days gradual onset palpitations and SOB on exertion, associated with midsternal chest tightness that is non-radiating. ROS additionally positive for epigastric pain. She states she usually had all these symptoms in the past for pHTN and asthma exacerbation. She endorse some baseline cough and sputum production but states it is not worse than usual. She denies fever, chills, dysuria, URI symptoms.    ED: T98.5,  -> 104, BP 120s/70s, 99% 4L. Labs notable for WBC 13.08, BNP 1861 (previously 840 7/2024). CT angio w/ patchy GGO (unchanged from previous).      Allergies    adhesives (Rash)  vancomycin (Rash)  penicillin (Rash)    Intolerances    	  MEDICATIONS:  buMETAnide 1 milliGRAM(s) Oral daily  sildenafil (REVATIO) 20 milliGRAM(s) Oral <User Schedule>  spironolactone 50 milliGRAM(s) Oral two times a day  buDESOnide   Inhalation Suspension 0.5 milliGRAM(s) Inhalation two times a day  ipratropium   for Nebulization 500 MICROGram(s) Nebulizer every 6 hours  montelukast 10 milliGRAM(s) Oral daily  acetaminophen     Tablet .. 650 milliGRAM(s) Oral every 6 hours PRN  acetaminophen 325 mG/butalbital 50 mG/caffeine 40 mG 1 Tablet(s) Oral every 8 hours PRN  HYDROmorphone  Injectable 0.25 milliGRAM(s) IV Push every 12 hours PRN  SUMAtriptan 50 milliGRAM(s) Oral every 12 hours PRN  aluminum hydroxide/magnesium hydroxide/simethicone Suspension 30 milliLiter(s) Oral every 4 hours PRN  pantoprazole    Tablet 40 milliGRAM(s) Oral before breakfast  predniSONE   Tablet 10 milliGRAM(s) Oral daily  fluticasone propionate 50 MICROgram(s)/spray Nasal Spray 1 Spray(s) Both Nostrils two times a day  influenza   Vaccine 0.5 milliLiter(s) IntraMuscular once      PAST MEDICAL & SURGICAL HISTORY:  Tachycardia    Anemia    Pulmonary hypertension    Pulmonary embolism    Asthma  On home O2 nightly at 2L via NC    PE (pulmonary thromboembolism)  on Xarelto 10 mg daily    Sinusitis    Corneal disorder    Right heart failure    CAD (coronary artery disease)    H/O pulmonary hypertension    Pulmonary embolism    Asthma    History of tachycardia    On supplemental oxygen by nasal cannula  O2 @ 2L    Pacemaker    Skin mass  left upper thigh mass    History of tubal ligation    Pacemaker    H/O tubal ligation    History of pacemaker  12/19 - [place] [employer] Model Accolade L331      FAMILY HISTORY:  Family history of diabetes mellitus  in brother    Family history of diabetes mellitus in mother    FH: anemia      SOCIAL HISTORY:    [ ] Non-smoker  [ ] Smoker  [ ] Alcohol      REVIEW OF SYSTEMS:  See HPI. Otherwise, 12 point ROS done and otherwise negative.    PHYSICAL EXAM:  T(C): 36.8 (10-16-24 @ 11:00), Max: 36.8 (10-16-24 @ 05:51)  HR: 109 (10-16-24 @ 16:18) (91 - 109)  BP: 132/90 (10-16-24 @ 16:18) (100/69 - 134/79)  RR: 18 (10-16-24 @ 11:00) (18 - 18)  SpO2: 97% (10-16-24 @ 11:00) (93% - 99%)  Wt(kg): --  I&O's Summary    15 Oct 2024 07:01  -  16 Oct 2024 07:00  --------------------------------------------------------  IN: 1200.5 mL / OUT: 1500 mL / NET: -299.5 mL    16 Oct 2024 07:01  -  16 Oct 2024 17:16  --------------------------------------------------------  IN: 860.6 mL / OUT: 800 mL / NET: 60.6 mL        Appearance: in NAD, but does not feel too well  Head: normocephalic  Eyes: no drainage  Neck: supple  Cardiovascular: RRR S1 S2, no m/r/g, occ tachycardia  Respiratory: slightly diminished at the bases otherwise clear, on 2L nasal cannula  Psychiatry: A & O x 3, Mood & affect appropriate  Gastrointestinal:  Soft, Non-tender, + BS	  : voiding  Skin: No rashes, No ecchymoses  Neurologic: Non-focal  Extremities: Normal range of motion, No clubbing, cyanosis or edema  Vascular: Peripheral pulses palpable 2+ bilaterally        LABS:	 	    CBC Full  -  ( 16 Oct 2024 09:11 )  WBC Count : 11.98 K/uL  Hemoglobin : 14.4 g/dL  Hematocrit : 46.6 %  Platelet Count - Automated : 368 K/uL  Mean Cell Volume : 68.5 fl  Mean Cell Hemoglobin : 21.2 pg  Mean Cell Hemoglobin Concentration : 30.9 gm/dL  Auto Neutrophil # : 8.63 K/uL  Auto Lymphocyte # : 2.06 K/uL  Auto Monocyte # : 1.01 K/uL  Auto Eosinophil # : 0.17 K/uL  Auto Basophil # : 0.07 K/uL  Auto Neutrophil % : 72.1 %  Auto Lymphocyte % : 17.2 %  Auto Monocyte % : 8.4 %  Auto Eosinophil % : 1.4 %  Auto Basophil % : 0.6 %    10-16    135  |  95[L]  |  17  ----------------------------<  129[H]  3.3[L]   |  22  |  1.18  10-15    135  |  98  |  19  ----------------------------<  126[H]  4.1   |  22  |  1.21    Ca    8.4      16 Oct 2024 09:11  Ca    8.9      15 Oct 2024 12:39  Phos  2.6     10-16  Phos  3.2     10-15  Mg     1.6     10-16  Mg     1.7     10-15    TPro  7.2  /  Alb  4.0  /  TBili  0.8  /  DBili  x   /  AST  12  /  ALT  12  /  AlkPhos  61  10-16      proBNP:   Lipid Profile:   HgA1c:   TSH:       CARDIAC MARKERS:      TELEMETRY: NSR/Sinus Tach 90-110s  	    ECG 10/15/2024: Sinus Tach @ 110 bpm    	  Echo 10/14/2024:    CONCLUSIONS:      1. Technically difficult image quality.   2. Left ventricular cavity is small. The interventricular septum is flattened in systole and diastole consistent with right ventricular pressure and volume overload. Left ventricular systolic function is hyperdynamic with an ejection fraction visually estimated at > 70 %. There is poor visualization of the endocardial borders to determine the presence of wall motion abnormalities.   3. The right ventricle is not well visualized. enlarged right ventricular cavity size and reduced right ventricular systolic function.   4. Mild to moderate pulmonic regurgitation.   5. Pulmonary artery systolic pressure could not be estimated.   6. Compared to the transthoracic echocardiogram performed on 7/16/2024, the right ventricle was not as well visualized on this study. There is again echocardiographic evidence of right ventricular failure. Quantification of severity is limited.  ________________________________________________________________________________________  FINDINGS:     Left Ventricle:  The left ventricular cavity is small. Left ventricular wall thickness is normal. The interventricular septum is flattened in systole and diastole consistent with right ventricular pressure and volume overload. Left ventricular systolic function is hyperdynamic with an ejection fraction visually estimated at > 70%. There is poor visualization of the endocardial borders to determine the presence of wall motion abnormalities.     Right Ventricle:  The right ventricle is not well visualized. The right ventricular cavity is enlarged in size and right ventricular systolic function is reduced. A device lead is visualized in the right ventricle.     Left Atrium:  The left atrium was not well visualized, and the LA volume could not be calculated.     Right Atrium:  The right atrium was not well visualized.     Aortic Valve:  The aortic valve was not well visualized. The aortic valve appears trileaflet. There is no aortic valve stenosis. There is trace aortic regurgitation.     Mitral Valve:  The mitral valve was not well visualized. There is no mitral valve stenosis. There is trace mitral regurgitation.     Tricuspid Valve:  The tricuspid valve was not well visualized. There is insufficient tricuspid regurgitation detected to calculate pulmonary artery systolic pressure. Unable to exclude the presence of tricuspid regurgitation. The tricuspid leaflets appear tethered.     Pulmonic Valve:  The pulmonic valve was not well visualized. There is mild to moderate pulmonic regurgitation. Unable to estimate PA diastolic pressure due to eccentric jet.     Aorta:  The proximal aorta and aortic arch are not well visualized. The aortic root at the sinuses of Valsalva is normal in size.     Pericardium:  There is a small pericardial effusion.     Systemic Veins:  The inferior vena cava is normal in size measuring 1.40 cm in diameter, (normal <2.1cm) with normal inspiratory collapse (normal >50%) consistent with normal right atrial pressure (~3, range 0-5mmHg).  ____________________________________________________________________  QUANTITATIVE DATA:  Left Ventricle Measurements: (Indexed to BSA)     IVSd (2D):   0.8 cm  LVPWd (2D):  0.7 cm  LVIDd (2D):  3.1 cm  LVIDs (2D):  1.5 cm  LV Mass:     56 g   27.4 g/m²  Visualized LV EF%: > 70%     MV E Vmax: 0.64 m/s  MV A Vmax: 0.84 m/s  MV E/A:    0.76  MV DT:     215 msec    Aorta Measurements: (Normal range) (Indexed to BSA)     Ao Root d 3.60 cm (2.7 - 3.3 cm) 1.75 cm/m²    Left Atrium Measurements: (Indexed to BSA)  LA Diam 2D: 2.40 cm    Right Ventricle Measurements:     RV Base (RVID1): 4.1 cm    LVOT / RVOT/ Qp/Qs Data: (Indexed to BSA)  LVOT Vmax:      0.83 m/s  LVOT Vmn:       0.532 m/s  LVOT VTI:       19.09 cm  LVOT peak grad: 3 mmHg    Aortic Valve Measurements:  AV Vmax:          1.0 m/s  AV Peak Gradient: 4.0 mmHg    Mitral Valve Measurements:     MV E Vmax: 0.6 m/s  MV A Vmax: 0.8 m/s  MV E/A:    0.8    Tricuspid Valve Measurements:     RA Pressure: 3 mmHg     CHIEF COMPLAINT:  palpitation, worsening SOUZA and chest tightness    HISTORY OF PRESENT ILLNESS:  44 year old woman with a history of pHTN (group I and IV on Rimodulin and Xarelto, on home 2L O2), c/b R heart failure, history of RA thrombus s/p thrombectomy, history of DVT/PE (2016), multiple abdominal wall abscesses (s/p I&D), asthma, bradycardia s/p dual chamber PPM. Patient presents with 2 days gradual onset palpitations and SOB on exertion, associated with midsternal chest tightness that is non-radiating. ROS additionally positive for epigastric pain. She states she usually had all these symptoms in the past for pHTN and asthma exacerbation. She endorse some baseline cough and sputum production but states it is not worse than usual. She denies fever, chills, dysuria, URI symptoms.    ED: T98.5,  -> 104, BP 120s/70s, 99% 4L. Labs notable for WBC 13.08, BNP 1861 (previously 840 7/2024). CT angio w/ patchy GGO (unchanged from previous).      Allergies    adhesives (Rash)  vancomycin (Rash)  penicillin (Rash)    Intolerances    	  MEDICATIONS:  buMETAnide 1 milliGRAM(s) Oral daily  sildenafil (REVATIO) 20 milliGRAM(s) Oral <User Schedule>  spironolactone 50 milliGRAM(s) Oral two times a day  buDESOnide   Inhalation Suspension 0.5 milliGRAM(s) Inhalation two times a day  ipratropium   for Nebulization 500 MICROGram(s) Nebulizer every 6 hours  montelukast 10 milliGRAM(s) Oral daily  acetaminophen     Tablet .. 650 milliGRAM(s) Oral every 6 hours PRN  acetaminophen 325 mG/butalbital 50 mG/caffeine 40 mG 1 Tablet(s) Oral every 8 hours PRN  HYDROmorphone  Injectable 0.25 milliGRAM(s) IV Push every 12 hours PRN  SUMAtriptan 50 milliGRAM(s) Oral every 12 hours PRN  aluminum hydroxide/magnesium hydroxide/simethicone Suspension 30 milliLiter(s) Oral every 4 hours PRN  pantoprazole    Tablet 40 milliGRAM(s) Oral before breakfast  predniSONE   Tablet 10 milliGRAM(s) Oral daily  fluticasone propionate 50 MICROgram(s)/spray Nasal Spray 1 Spray(s) Both Nostrils two times a day  influenza   Vaccine 0.5 milliLiter(s) IntraMuscular once      PAST MEDICAL & SURGICAL HISTORY:  Tachycardia    Anemia    Pulmonary hypertension    Pulmonary embolism    Asthma  On home O2 nightly at 2L via NC    PE (pulmonary thromboembolism)  on Xarelto 10 mg daily    Sinusitis    Corneal disorder    Right heart failure    CAD (coronary artery disease)    H/O pulmonary hypertension    Pulmonary embolism    Asthma    History of tachycardia    On supplemental oxygen by nasal cannula  O2 @ 2L    Pacemaker    Skin mass  left upper thigh mass    History of tubal ligation    Pacemaker    H/O tubal ligation    History of pacemaker  12/19 - [place] [employer] Model Accolade L331      FAMILY HISTORY:  Family history of diabetes mellitus  in brother    Family history of diabetes mellitus in mother    FH: anemia      SOCIAL HISTORY:    [ ] Non-smoker  [ ] Smoker  [ ] Alcohol      REVIEW OF SYSTEMS:  See HPI. Otherwise, 12 point ROS done and otherwise negative.    PHYSICAL EXAM:  T(C): 36.8 (10-16-24 @ 11:00), Max: 36.8 (10-16-24 @ 05:51)  HR: 109 (10-16-24 @ 16:18) (91 - 109)  BP: 132/90 (10-16-24 @ 16:18) (100/69 - 134/79)  RR: 18 (10-16-24 @ 11:00) (18 - 18)  SpO2: 97% (10-16-24 @ 11:00) (93% - 99%)  Wt(kg): --  I&O's Summary    15 Oct 2024 07:01  -  16 Oct 2024 07:00  --------------------------------------------------------  IN: 1200.5 mL / OUT: 1500 mL / NET: -299.5 mL    16 Oct 2024 07:01  -  16 Oct 2024 17:16  --------------------------------------------------------  IN: 860.6 mL / OUT: 800 mL / NET: 60.6 mL        Appearance: in NAD, but does not feel too well  Head: normocephalic  Eyes: no drainage  Neck: supple  Cardiovascular: RRR S1 S2, no m/r/g, occ tachycardia  Respiratory: slightly diminished at the bases otherwise clear, on 2L nasal cannula  Psychiatry: A & O x 3, Mood & affect appropriate, occ dizziness even while sitting down  Gastrointestinal:  Soft, Non-tender, + BS	  : voiding  Skin: No rashes, No ecchymoses  Neurologic: Non-focal  Extremities: Normal range of motion, No clubbing, cyanosis or edema  Vascular: Peripheral pulses palpable 2+ bilaterally        LABS:	 	    CBC Full  -  ( 16 Oct 2024 09:11 )  WBC Count : 11.98 K/uL  Hemoglobin : 14.4 g/dL  Hematocrit : 46.6 %  Platelet Count - Automated : 368 K/uL  Mean Cell Volume : 68.5 fl  Mean Cell Hemoglobin : 21.2 pg  Mean Cell Hemoglobin Concentration : 30.9 gm/dL  Auto Neutrophil # : 8.63 K/uL  Auto Lymphocyte # : 2.06 K/uL  Auto Monocyte # : 1.01 K/uL  Auto Eosinophil # : 0.17 K/uL  Auto Basophil # : 0.07 K/uL  Auto Neutrophil % : 72.1 %  Auto Lymphocyte % : 17.2 %  Auto Monocyte % : 8.4 %  Auto Eosinophil % : 1.4 %  Auto Basophil % : 0.6 %    10-16    135  |  95[L]  |  17  ----------------------------<  129[H]  3.3[L]   |  22  |  1.18  10-15    135  |  98  |  19  ----------------------------<  126[H]  4.1   |  22  |  1.21    Ca    8.4      16 Oct 2024 09:11  Ca    8.9      15 Oct 2024 12:39  Phos  2.6     10-16  Phos  3.2     10-15  Mg     1.6     10-16  Mg     1.7     10-15    TPro  7.2  /  Alb  4.0  /  TBili  0.8  /  DBili  x   /  AST  12  /  ALT  12  /  AlkPhos  61  10-16      proBNP:   Lipid Profile:   HgA1c:   TSH:       CARDIAC MARKERS:      TELEMETRY: NSR/Sinus Tach 90-110s  	    ECG 10/15/2024: Sinus Tach @ 110 bpm    	  Echo 10/14/2024:    CONCLUSIONS:      1. Technically difficult image quality.   2. Left ventricular cavity is small. The interventricular septum is flattened in systole and diastole consistent with right ventricular pressure and volume overload. Left ventricular systolic function is hyperdynamic with an ejection fraction visually estimated at > 70 %. There is poor visualization of the endocardial borders to determine the presence of wall motion abnormalities.   3. The right ventricle is not well visualized. enlarged right ventricular cavity size and reduced right ventricular systolic function.   4. Mild to moderate pulmonic regurgitation.   5. Pulmonary artery systolic pressure could not be estimated.   6. Compared to the transthoracic echocardiogram performed on 7/16/2024, the right ventricle was not as well visualized on this study. There is again echocardiographic evidence of right ventricular failure. Quantification of severity is limited.  ________________________________________________________________________________________  FINDINGS:     Left Ventricle:  The left ventricular cavity is small. Left ventricular wall thickness is normal. The interventricular septum is flattened in systole and diastole consistent with right ventricular pressure and volume overload. Left ventricular systolic function is hyperdynamic with an ejection fraction visually estimated at > 70%. There is poor visualization of the endocardial borders to determine the presence of wall motion abnormalities.     Right Ventricle:  The right ventricle is not well visualized. The right ventricular cavity is enlarged in size and right ventricular systolic function is reduced. A device lead is visualized in the right ventricle.     Left Atrium:  The left atrium was not well visualized, and the LA volume could not be calculated.     Right Atrium:  The right atrium was not well visualized.     Aortic Valve:  The aortic valve was not well visualized. The aortic valve appears trileaflet. There is no aortic valve stenosis. There is trace aortic regurgitation.     Mitral Valve:  The mitral valve was not well visualized. There is no mitral valve stenosis. There is trace mitral regurgitation.     Tricuspid Valve:  The tricuspid valve was not well visualized. There is insufficient tricuspid regurgitation detected to calculate pulmonary artery systolic pressure. Unable to exclude the presence of tricuspid regurgitation. The tricuspid leaflets appear tethered.     Pulmonic Valve:  The pulmonic valve was not well visualized. There is mild to moderate pulmonic regurgitation. Unable to estimate PA diastolic pressure due to eccentric jet.     Aorta:  The proximal aorta and aortic arch are not well visualized. The aortic root at the sinuses of Valsalva is normal in size.     Pericardium:  There is a small pericardial effusion.     Systemic Veins:  The inferior vena cava is normal in size measuring 1.40 cm in diameter, (normal <2.1cm) with normal inspiratory collapse (normal >50%) consistent with normal right atrial pressure (~3, range 0-5mmHg).  ____________________________________________________________________  QUANTITATIVE DATA:  Left Ventricle Measurements: (Indexed to BSA)     IVSd (2D):   0.8 cm  LVPWd (2D):  0.7 cm  LVIDd (2D):  3.1 cm  LVIDs (2D):  1.5 cm  LV Mass:     56 g   27.4 g/m²  Visualized LV EF%: > 70%     MV E Vmax: 0.64 m/s  MV A Vmax: 0.84 m/s  MV E/A:    0.76  MV DT:     215 msec    Aorta Measurements: (Normal range) (Indexed to BSA)     Ao Root d 3.60 cm (2.7 - 3.3 cm) 1.75 cm/m²    Left Atrium Measurements: (Indexed to BSA)  LA Diam 2D: 2.40 cm    Right Ventricle Measurements:     RV Base (RVID1): 4.1 cm    LVOT / RVOT/ Qp/Qs Data: (Indexed to BSA)  LVOT Vmax:      0.83 m/s  LVOT Vmn:       0.532 m/s  LVOT VTI:       19.09 cm  LVOT peak grad: 3 mmHg    Aortic Valve Measurements:  AV Vmax:          1.0 m/s  AV Peak Gradient: 4.0 mmHg    Mitral Valve Measurements:     MV E Vmax: 0.6 m/s  MV A Vmax: 0.8 m/s  MV E/A:    0.8    Tricuspid Valve Measurements:     RA Pressure: 3 mmHg

## 2024-10-16 NOTE — CONSULT NOTE ADULT - ASSESSMENT
45 y/o female  with a history of pHTN (group I and IV on Rimodulin and Xarelto, on home 2L O2), c/b R heart failure, history of RA thrombus s/p thrombectomy, history of DVT/PE (2016), multiple abdominal wall abscesses (s/p I&D), asthma, bradycardia s/p dual chamber PPM, s/p Adenosine Sensitive SVT Ablation.  Patient presents with 2 days gradual onset palpitations and SOB on exertion, associated with midsternal chest tightness that is non-radiating. ROS additionally positive for epigastric pain. She states she usually had all these symptoms in the past for pHTN and asthma exacerbation. EPS consulted regarding evaluation of pacemaker parameters for tachycardia and palpitations.    1. Sinus Tachycardia, Palpitations  2. pHTN  3. Volume Overload  4. Hx of BSC PPM for Bradycardia    - PPM was interrogated on 10/14/24 that showed 6 SVT and 2 nonsustained episodes, available EGMs c/w SVT with ventricular rates of 160s  - Pacemaker parameter DDD @ 60 ppm, with ventricular tachy monitor set at 160 bpm  - Patient with history of chronic tachycardia iso of pHTN/Asthma  - Was on Xarelto, d/cd on 10/14  - Being diuresed, on Bumex and Spironolactone  - As d/w Dr Chavis, prefer not to start betablocker d/t pHTN  - Will d/w EP attdg regarding any parameter adjustment if needed  - Continue telemetry monitoring    #[id number]

## 2024-10-16 NOTE — PROGRESS NOTE ADULT - PROBLEM SELECTOR PLAN 8
Bradycardia s/p PPM. Presenting with palpitations.    Plan:  - S/p EP consult for PPM interrogation  - Telemetry monitoring

## 2024-10-16 NOTE — PROGRESS NOTE ADULT - ATTENDING COMMENTS
44F PMH pHTN (group I and IV on Rimodulin and Xarelto, on home 2L O2), c/b R heart failure, history of RA thrombus s/p thrombectomy, history of DVT/PE (2016), multiple abdominal wall abscesses (s/p I&D), asthma, bradycardia s/p dual chamber PPM presenting with SOUZA, palpitations, and epigastric pain.     #pHTN exacerbation  #Chronic hypoxemic respiraotry failure  - Continue diuresis with Bumex 1 mg IV BID, follow pulm recs  - Remodulin increased to 51  - Continue Revatio, patient will bring home med as she feels hospital formulation gives her headache.   - Continue O2 therapy with NC, baseline 2 L  - Ambulate patient today, up in chair with meals  #Acute on chronic leukocytosis  #Steroid-induced leukocytosis  - infectious workup negative to date. Monitor off ABX, low threshold to initiate  #Epigastric pain  #hx GERD  - Protonix 40 mg qd, dc carafate    - Reviewing, and interpreting labs and testing.  - Independently obtaining a review of systems and performing a physical exam  - Reviewing consultant documentation/recommendations in addition to discussing plan of care with consultants.  - Counselling and educating patient and family regarding interpretation of aforementioned items and plan of care.  - This excludes time spent teaching residents/medical students    Time Spent: 37 minutes.

## 2024-10-16 NOTE — PROGRESS NOTE ADULT - PROBLEM SELECTOR PLAN 6
- Pt on 2 L O2 at home   - currently 97 spO2 on 2L nasal canula.     Plan:   - continue on O2   - Monitor RR and O2 saturation.  - supplemental O2 as needed for sat >90 - Pt on 2 L O2 at home   - currently 99 spO2 on 2L nasal canula.     Plan:   - continue on O2   - Monitor RR and O2 saturation.  - supplemental O2 as needed for sat >90

## 2024-10-16 NOTE — CONSULT NOTE ADULT - PROBLEM SELECTOR RECOMMENDATION 3
check TSH  unclear if has paroxysms of SVT; none detected on PPM interrogation but monitoring zone >160  appreciate EP input; possible change in monitor zone

## 2024-10-16 NOTE — PROGRESS NOTE ADULT - PROBLEM SELECTOR PLAN 1
-History of group 1 + 4 pHTN on Sildenafil and Rimodulin   -pro-BNP elevated at 1861  -CXR with pulm edema  -TTE on 10/14 shows enlarged right ventricular cavity size and reduced right ventricular systolic function with evidence of right ventricular failure.   - Pt advised to ambulate   - Pt's findings consistent with Pulmonary hypertension exacerbation however infectious etiology remains plausible but less likely.    Plan:  - Pulm recs appreciated  - C/w Sildenafil 20mg TID and Remodulin 49ng, planning to increase to 50ng pending accurate standing weights  - Home torsemide 20 mg qd on hold. Continue Bumex 1mg IV BID.  - Start Aldactone 50mg  - Continue O2 therapy with NC, baseline 2 L  - Encourage ambulation as tolerated -History of group 1 + 4 pHTN on Sildenafil and Rimodulin   -pro-BNP elevated at 1861  -CXR with pulm edema  -TTE on 10/14 shows enlarged right ventricular cavity size and reduced right ventricular systolic function with evidence of right ventricular failure.   - Pt advised to ambulate   - Pt's findings consistent with Pulmonary hypertension exacerbation however infectious etiology remains plausible but less likely.    Plan:  - Pulm recs appreciated  - C/w Sildenafil 20mg TID and   - increase Remodulin from 50ng to 51ng  - Home torsemide 20 mg qd on hold. Continue Bumex 1mg IV BID.  - Start Aldactone 50mg  - Continue O2 therapy with NC, baseline 2 L  - Encourage ambulation as tolerated -History of group 1 + 4 pHTN on Sildenafil and Rimodulin   -pro-BNP elevated at 1861  -CXR with pulm edema  -TTE on 10/14 shows enlarged right ventricular cavity size and reduced right ventricular systolic function with evidence of right ventricular failure.   - Pt advised to ambulate   - Pt's findings consistent with Pulmonary hypertension exacerbation however infectious etiology remains plausible but less likely.    Plan:  - Pulm recs appreciated  - C/w Sildenafil 20mg TID  - increase Remodulin from 50ng to 51ng  - Home torsemide 20 mg qd on hold. Continue Bumex 1mg IV BID.  - Start Aldactone 50mg  - Continue O2 therapy with NC, baseline 2 L  - Encourage ambulation as tolerated -History of group 1 + 4 pHTN on Sildenafil and Remodulin   -pro-BNP elevated at 1861  -CXR with pulm edema  -TTE on 10/14 shows enlarged right ventricular cavity size and reduced right ventricular systolic function with evidence of right ventricular failure    Plan:  - Pulm recs appreciated  - C/w Sildenafil 20mg TID  - Increase Remodulin from 49ng to 51ng  - Home torsemide 20 mg qd on hold. Continue Bumex 1mg IV BID.  - C/w Aldactone 50mg  - Continue O2 therapy with NC, baseline 2 L  - Encourage ambulation as tolerated

## 2024-10-16 NOTE — PROGRESS NOTE ADULT - PROBLEM SELECTOR PLAN 2
-History of RV failure iso pHTN, presented with worsening SOUZA, on exam with JVD  - pro-BNP elevated at 1861  - Home regimen: Aldactone 25mg QD, Torsemide 20mg QD  - s/p Torsemide 20mg PO + 1mg Bumex (10/14) in ED.   -  TTE on 10/14 shows enlarged right ventricular cavity size and reduced right ventricular systolic function with evidence of right ventricular failure. EF 70%.   - Standing weight on 10/14: 97.6 kg    Plan:  - Home torsemide 20 mg qd on hold. Continue Bumex 1mg IV BID  - Continue Aldactone 50mg  - Strict I&O, daily standing weight  - Consider transition to torsemide -History of RV failure iso pHTN, presented with worsening SOUZA, on exam with JVD  - pro-BNP elevated at 1861  - Home regimen: Aldactone 25mg QD, Torsemide 20mg QD  - s/p Torsemide 20mg PO + 1mg Bumex (10/14) in ED.   -  TTE on 10/14 shows enlarged right ventricular cavity size and reduced right ventricular systolic function with evidence of right ventricular failure. EF 70%.   - Standing weight on 10/14: 97.6 kg    Plan:  - Home torsemide 20 mg qd on hold. Continue Bumex 1mg IV BID  - Continue Aldactone 50mg  - Strict I&O, daily standing weight  - Consider transition to torsemide  -monitor BUN/Cr -History of RV failure iso pHTN, presented with worsening SOUZA, on exam with JVD  - pro-BNP elevated at 1861  - Home regimen: Aldactone 25mg QD, Torsemide 20mg QD  - s/p Torsemide 20mg PO + 1mg Bumex (10/14) in ED.   -  TTE on 10/14 shows enlarged right ventricular cavity size and reduced right ventricular systolic function with evidence of right ventricular failure. EF 70%.   - Standing weight on 10/14: 97.6 kg    Plan:  - Home torsemide 20 mg qd on hold. Continue Bumex 1mg IV BID  - Continue Aldactone 50mg  - Strict I&O, daily standing weight  - Consider transition to torsemide  - monitor BUN/Cr -History of RV failure iso pHTN, presented with worsening SOUZA, on exam with JVD  - pro-BNP elevated at 1861  - Home regimen: Aldactone 25mg QD, Torsemide 20mg QD  - s/p Torsemide 20mg PO + 1mg Bumex (10/14) in ED.   -  TTE on 10/14 shows enlarged right ventricular cavity size and reduced right ventricular systolic function with evidence of right ventricular failure. EF 70%.   - Standing weight on 10/14: 97.6 kg    Plan:  - Consulted Dr. Chavis, outpatient HF  - Home torsemide 20 mg qd on hold. Continue Bumex 1mg IV BID  - C/w Aldactone 50mg  - Strict I&O, daily standing weight  - Consider transition to torsemide  - Monitor BUN/Cr

## 2024-10-16 NOTE — PROGRESS NOTE ADULT - PROBLEM SELECTOR PLAN 9
Diet: DASH  DVT ppx: Xarelto 20mg QD  Disposition: Pending course  Code: Full code    Protonix for GERD Diet: DASH  DVT ppx: Xarelto 20mg QD  Disposition: Pending course  Code: Full code Diet: Regular  DVT ppx: Xarelto 20mg QD  Disposition: Pending course  Code: Full code    Fluticasone for allergies

## 2024-10-17 DIAGNOSIS — I26.99 OTHER PULMONARY EMBOLISM WITHOUT ACUTE COR PULMONALE: ICD-10-CM

## 2024-10-17 LAB
ANION GAP SERPL CALC-SCNC: 14 MMOL/L — SIGNIFICANT CHANGE UP (ref 5–17)
APTT BLD: 107.4 SEC — HIGH (ref 24.5–35.6)
APTT BLD: >200 SEC — CRITICAL HIGH (ref 24.5–35.6)
BASOPHILS # BLD AUTO: 0.09 K/UL — SIGNIFICANT CHANGE UP (ref 0–0.2)
BASOPHILS NFR BLD AUTO: 0.6 % — SIGNIFICANT CHANGE UP (ref 0–2)
BUN SERPL-MCNC: 16 MG/DL — SIGNIFICANT CHANGE UP (ref 7–23)
CALCIUM SERPL-MCNC: 8.8 MG/DL — SIGNIFICANT CHANGE UP (ref 8.4–10.5)
CHLORIDE SERPL-SCNC: 96 MMOL/L — SIGNIFICANT CHANGE UP (ref 96–108)
CO2 SERPL-SCNC: 23 MMOL/L — SIGNIFICANT CHANGE UP (ref 22–31)
CREAT SERPL-MCNC: 1.1 MG/DL — SIGNIFICANT CHANGE UP (ref 0.5–1.3)
EGFR: 64 ML/MIN/1.73M2 — SIGNIFICANT CHANGE UP
EOSINOPHIL # BLD AUTO: 0.26 K/UL — SIGNIFICANT CHANGE UP (ref 0–0.5)
EOSINOPHIL NFR BLD AUTO: 1.8 % — SIGNIFICANT CHANGE UP (ref 0–6)
GLUCOSE SERPL-MCNC: 143 MG/DL — HIGH (ref 70–99)
HCT VFR BLD CALC: 42.2 % — SIGNIFICANT CHANGE UP (ref 34.5–45)
HCT VFR BLD CALC: 46 % — HIGH (ref 34.5–45)
HGB BLD-MCNC: 14.2 G/DL — SIGNIFICANT CHANGE UP (ref 11.5–15.5)
HGB BLD-MCNC: 14.5 G/DL — SIGNIFICANT CHANGE UP (ref 11.5–15.5)
IMM GRANULOCYTES NFR BLD AUTO: 0.5 % — SIGNIFICANT CHANGE UP (ref 0–0.9)
LYMPHOCYTES # BLD AUTO: 1.97 K/UL — SIGNIFICANT CHANGE UP (ref 1–3.3)
LYMPHOCYTES # BLD AUTO: 13.5 % — SIGNIFICANT CHANGE UP (ref 13–44)
MAGNESIUM SERPL-MCNC: 1.8 MG/DL — SIGNIFICANT CHANGE UP (ref 1.6–2.6)
MCHC RBC-ENTMCNC: 21.4 PG — LOW (ref 27–34)
MCHC RBC-ENTMCNC: 22.3 PG — LOW (ref 27–34)
MCHC RBC-ENTMCNC: 30.9 GM/DL — LOW (ref 32–36)
MCHC RBC-ENTMCNC: 34.4 GM/DL — SIGNIFICANT CHANGE UP (ref 32–36)
MCV RBC AUTO: 64.8 FL — LOW (ref 80–100)
MCV RBC AUTO: 69.3 FL — LOW (ref 80–100)
MONOCYTES # BLD AUTO: 1.24 K/UL — HIGH (ref 0–0.9)
MONOCYTES NFR BLD AUTO: 8.5 % — SIGNIFICANT CHANGE UP (ref 2–14)
NEUTROPHILS # BLD AUTO: 10.97 K/UL — HIGH (ref 1.8–7.4)
NEUTROPHILS NFR BLD AUTO: 75.1 % — SIGNIFICANT CHANGE UP (ref 43–77)
NRBC # BLD: 0 /100 WBCS — SIGNIFICANT CHANGE UP (ref 0–0)
NRBC # BLD: 0 /100 WBCS — SIGNIFICANT CHANGE UP (ref 0–0)
PHOSPHATE SERPL-MCNC: 2.6 MG/DL — SIGNIFICANT CHANGE UP (ref 2.5–4.5)
PLATELET # BLD AUTO: 395 K/UL — SIGNIFICANT CHANGE UP (ref 150–400)
PLATELET # BLD AUTO: 412 K/UL — HIGH (ref 150–400)
POTASSIUM SERPL-MCNC: 3.6 MMOL/L — SIGNIFICANT CHANGE UP (ref 3.5–5.3)
POTASSIUM SERPL-SCNC: 3.6 MMOL/L — SIGNIFICANT CHANGE UP (ref 3.5–5.3)
RBC # BLD: 6.51 M/UL — HIGH (ref 3.8–5.2)
RBC # BLD: 6.64 M/UL — HIGH (ref 3.8–5.2)
RBC # FLD: 19.5 % — HIGH (ref 10.3–14.5)
RBC # FLD: 20.7 % — HIGH (ref 10.3–14.5)
SODIUM SERPL-SCNC: 133 MMOL/L — LOW (ref 135–145)
T3 SERPL-MCNC: 87 NG/DL — SIGNIFICANT CHANGE UP (ref 80–200)
T4 AB SER-ACNC: 9.6 UG/DL — SIGNIFICANT CHANGE UP (ref 4.6–12)
TSH SERPL-MCNC: 2.16 UIU/ML — SIGNIFICANT CHANGE UP (ref 0.27–4.2)
WBC # BLD: 13.72 K/UL — HIGH (ref 3.8–10.5)
WBC # BLD: 14.6 K/UL — HIGH (ref 3.8–10.5)
WBC # FLD AUTO: 13.72 K/UL — HIGH (ref 3.8–10.5)
WBC # FLD AUTO: 14.6 K/UL — HIGH (ref 3.8–10.5)

## 2024-10-17 PROCEDURE — 99232 SBSQ HOSP IP/OBS MODERATE 35: CPT | Mod: GC

## 2024-10-17 PROCEDURE — 99233 SBSQ HOSP IP/OBS HIGH 50: CPT

## 2024-10-17 PROCEDURE — 99232 SBSQ HOSP IP/OBS MODERATE 35: CPT

## 2024-10-17 RX ORDER — HEPARIN SODIUM 10000 [USP'U]/ML
INJECTION INTRAVENOUS; SUBCUTANEOUS
Qty: 25000 | Refills: 0 | Status: DISCONTINUED | OUTPATIENT
Start: 2024-10-17 | End: 2024-10-18

## 2024-10-17 RX ORDER — POTASSIUM CHLORIDE 10 MEQ
40 TABLET, EXTENDED RELEASE ORAL ONCE
Refills: 0 | Status: COMPLETED | OUTPATIENT
Start: 2024-10-17 | End: 2024-10-17

## 2024-10-17 RX ADMIN — Medication 0.25 MILLIGRAM(S): at 23:00

## 2024-10-17 RX ADMIN — Medication 0.25 MILLIGRAM(S): at 22:30

## 2024-10-17 RX ADMIN — IPRATROPIUM BROMIDE 500 MICROGRAM(S): 0.5 SOLUTION RESPIRATORY (INHALATION) at 11:12

## 2024-10-17 RX ADMIN — Medication 20 MILLIGRAM(S): at 05:08

## 2024-10-17 RX ADMIN — Medication 50 MILLIGRAM(S): at 07:52

## 2024-10-17 RX ADMIN — MONTELUKAST SODIUM 10 MILLIGRAM(S): 10 TABLET, FILM COATED ORAL at 11:12

## 2024-10-17 RX ADMIN — Medication 0.25 MILLIGRAM(S): at 10:30

## 2024-10-17 RX ADMIN — HEPARIN SODIUM 1300 UNIT(S)/HR: 10000 INJECTION INTRAVENOUS; SUBCUTANEOUS at 23:31

## 2024-10-17 RX ADMIN — Medication 1 MILLIGRAM(S): at 05:09

## 2024-10-17 RX ADMIN — Medication 10 MILLIGRAM(S): at 05:08

## 2024-10-17 RX ADMIN — BUDESONIDE 0.5 MILLIGRAM(S): 3 CAPSULE ORAL at 05:01

## 2024-10-17 RX ADMIN — Medication 50 MILLIGRAM(S): at 17:07

## 2024-10-17 RX ADMIN — FLUTICASONE PROPIONATE 1 SPRAY(S): 50 SPRAY, METERED NASAL at 05:09

## 2024-10-17 RX ADMIN — Medication 40 MILLIEQUIVALENT(S): at 10:30

## 2024-10-17 RX ADMIN — FLUTICASONE PROPIONATE 1 SPRAY(S): 50 SPRAY, METERED NASAL at 17:07

## 2024-10-17 RX ADMIN — IPRATROPIUM BROMIDE 500 MICROGRAM(S): 0.5 SOLUTION RESPIRATORY (INHALATION) at 17:08

## 2024-10-17 RX ADMIN — HEPARIN SODIUM 1500 UNIT(S)/HR: 10000 INJECTION INTRAVENOUS; SUBCUTANEOUS at 17:12

## 2024-10-17 RX ADMIN — HEPARIN SODIUM 0 UNIT(S)/HR: 10000 INJECTION INTRAVENOUS; SUBCUTANEOUS at 15:29

## 2024-10-17 RX ADMIN — IPRATROPIUM BROMIDE 500 MICROGRAM(S): 0.5 SOLUTION RESPIRATORY (INHALATION) at 05:06

## 2024-10-17 RX ADMIN — HEPARIN SODIUM 1800 UNIT(S)/HR: 10000 INJECTION INTRAVENOUS; SUBCUTANEOUS at 07:53

## 2024-10-17 RX ADMIN — PANTOPRAZOLE SODIUM 40 MILLIGRAM(S): 40 TABLET, DELAYED RELEASE ORAL at 05:08

## 2024-10-17 RX ADMIN — Medication 20 MILLIGRAM(S): at 21:13

## 2024-10-17 RX ADMIN — Medication 0.25 MILLIGRAM(S): at 11:00

## 2024-10-17 RX ADMIN — IPRATROPIUM BROMIDE 500 MICROGRAM(S): 0.5 SOLUTION RESPIRATORY (INHALATION) at 01:06

## 2024-10-17 RX ADMIN — BUDESONIDE 0.5 MILLIGRAM(S): 3 CAPSULE ORAL at 17:07

## 2024-10-17 NOTE — PROGRESS NOTE ADULT - PROBLEM SELECTOR PLAN 10
Diet: Regular  DVT ppx: Xarelto 20mg QD, switch to heparin gtt for RHC  Disposition: Pending course  Code: Full code    Fluticasone for allergies

## 2024-10-17 NOTE — PROGRESS NOTE ADULT - SUBJECTIVE AND OBJECTIVE BOX
24H hour events:     MEDICATIONS:  buMETAnide 1 milliGRAM(s) Oral daily  heparin  Infusion.  Unit(s)/Hr IV Continuous <Continuous>  sildenafil (REVATIO) 20 milliGRAM(s) Oral <User Schedule>  spironolactone 50 milliGRAM(s) Oral two times a day      buDESOnide    Inhalation Suspension 0.5 milliGRAM(s) Inhalation two times a day  ipratropium    for Nebulization 500 MICROGram(s) Nebulizer every 6 hours  montelukast 10 milliGRAM(s) Oral daily    acetaminophen     Tablet .. 650 milliGRAM(s) Oral every 6 hours PRN  acetaminophen 325 mG/butalbital 50 mG/caffeine 40 mG 1 Tablet(s) Oral every 8 hours PRN  HYDROmorphone  Injectable 0.25 milliGRAM(s) IV Push every 12 hours PRN  SUMAtriptan 50 milliGRAM(s) Oral every 12 hours PRN    aluminum hydroxide/magnesium hydroxide/simethicone Suspension 30 milliLiter(s) Oral every 4 hours PRN  pantoprazole    Tablet 40 milliGRAM(s) Oral before breakfast    predniSONE   Tablet 10 milliGRAM(s) Oral daily    fluticasone propionate 50 MICROgram(s)/spray Nasal Spray 1 Spray(s) Both Nostrils two times a day  influenza   Vaccine 0.5 milliLiter(s) IntraMuscular once  potassium chloride    Tablet ER 40 milliEquivalent(s) Oral once      REVIEW OF SYSTEMS:  Complete 12 point ROS negative.    PHYSICAL EXAM:  T(C): 36.6 (10-17-24 @ 05:10), Max: 36.8 (10-16-24 @ 11:00)  HR: 105 (10-17-24 @ 05:10) (91 - 109)  BP: 119/89 (10-17-24 @ 07:49) (99/66 - 132/90)  RR: 18 (10-17-24 @ 07:49) (18 - 18)  SpO2: 100% (10-17-24 @ 05:10) (97% - 100%)  Wt(kg): --  I&O's Summary    16 Oct 2024 07:01  -  17 Oct 2024 07:00  --------------------------------------------------------  IN: 1251.3 mL / OUT: 1200 mL / NET: 51.3 mL    17 Oct 2024 07:01  -  17 Oct 2024 10:26  --------------------------------------------------------  IN: 238.1 mL / OUT: 0 mL / NET: 238.1 mL        Appearance: Normal	  HEENT: PERRL, EOMI	  Cardiovascular: Normal S1 S2, No JVD, No murmurs  Respiratory: Lungs clear to auscultation	  Psychiatry: A & O x 3, Mood & affect appropriate  Gastrointestinal:  Soft, Non-tender, + BS	  Skin: No rashes, No ecchymoses, No cyanosis	  Neurologic: Grossly intact  Extremities: No clubbing, cyanosis or edema  Vascular: Peripheral pulses palpable 2+ bilaterally        LABS:	 	    CBC Full  -  ( 17 Oct 2024 09:27 )  WBC Count : 14.60 K/uL  Hemoglobin : 14.5 g/dL  Hematocrit : 42.2 %  Platelet Count - Automated : 395 K/uL  Mean Cell Volume : 64.8 fl  Mean Cell Hemoglobin : 22.3 pg  Mean Cell Hemoglobin Concentration : 34.4 gm/dL  Auto Neutrophil # : 10.97 K/uL  Auto Lymphocyte # : 1.97 K/uL  Auto Monocyte # : 1.24 K/uL  Auto Eosinophil # : 0.26 K/uL  Auto Basophil # : 0.09 K/uL  Auto Neutrophil % : 75.1 %  Auto Lymphocyte % : 13.5 %  Auto Monocyte % : 8.5 %  Auto Eosinophil % : 1.8 %  Auto Basophil % : 0.6 %    10-17    133[L]  |  96  |  16  ----------------------------<  143[H]  3.6   |  23  |  1.10  10-16    135  |  95[L]  |  17  ----------------------------<  129[H]  3.3[L]   |  22  |  1.18    Ca    8.8      17 Oct 2024 09:27  Ca    8.4      16 Oct 2024 09:11  Phos  2.6     10-17  Phos  2.6     10-16  Mg     1.8     10-17  Mg     1.6     10-16    TPro  7.2  /  Alb  4.0  /  TBili  0.8  /  DBili  x   /  AST  12  /  ALT  12  /  AlkPhos  61  10-16      proBNP:   Lipid Profile:   HgA1c:   TSH:       CARDIAC MARKERS:          TELEMETRY: 	    ECG:  	  RADIOLOGY:  OTHER: 	    PREVIOUS DIAGNOSTIC TESTING:    [ ] Echocardiogram:    [ ]  Catheterization:  [ ] Stress Test:  	  	  ASSESSMENT/PLAN: 	     24H hour events: No acute events overnight, Tele: SR/ST 's     MEDICATIONS:  buMETAnide 1 milliGRAM(s) Oral daily  heparin  Infusion.  Unit(s)/Hr IV Continuous <Continuous>  sildenafil (REVATIO) 20 milliGRAM(s) Oral <User Schedule>  spironolactone 50 milliGRAM(s) Oral two times a day  buDESOnide    Inhalation Suspension 0.5 milliGRAM(s) Inhalation two times a day  ipratropium    for Nebulization 500 MICROGram(s) Nebulizer every 6 hours  montelukast 10 milliGRAM(s) Oral daily  acetaminophen     Tablet .. 650 milliGRAM(s) Oral every 6 hours PRN  acetaminophen 325 mG/butalbital 50 mG/caffeine 40 mG 1 Tablet(s) Oral every 8 hours PRN  HYDROmorphone  Injectable 0.25 milliGRAM(s) IV Push every 12 hours PRN  SUMAtriptan 50 milliGRAM(s) Oral every 12 hours PRN  aluminum hydroxide/magnesium hydroxide/simethicone Suspension 30 milliLiter(s) Oral every 4 hours PRN  pantoprazole    Tablet 40 milliGRAM(s) Oral before breakfast  predniSONE   Tablet 10 milliGRAM(s) Oral daily  fluticasone propionate 50 MICROgram(s)/spray Nasal Spray 1 Spray(s) Both Nostrils two times a day  influenza   Vaccine 0.5 milliLiter(s) IntraMuscular once  potassium chloride    Tablet ER 40 milliEquivalent(s) Oral once      REVIEW OF SYSTEMS:  Complete 12 point ROS negative.    PHYSICAL EXAM:  T(C): 36.6 (10-17-24 @ 05:10), Max: 36.8 (10-16-24 @ 11:00)  HR: 105 (10-17-24 @ 05:10) (91 - 109)  BP: 119/89 (10-17-24 @ 07:49) (99/66 - 132/90)  RR: 18 (10-17-24 @ 07:49) (18 - 18)  SpO2: 100% (10-17-24 @ 05:10) (97% - 100%)  Wt(kg): --  I&O's Summary    16 Oct 2024 07:01  -  17 Oct 2024 07:00  --------------------------------------------------------  IN: 1251.3 mL / OUT: 1200 mL / NET: 51.3 mL    17 Oct 2024 07:01  -  17 Oct 2024 10:26  --------------------------------------------------------  IN: 238.1 mL / OUT: 0 mL / NET: 238.1 mL        Appearance: NAD, OOB sitting up in chair   HEENT: PERRL, EOMI	  Cardiovascular: Normal S1 S2, RRR, No JVD, No murmurs  Respiratory: Lungs clear to auscultation	  Psychiatry: A & O x 3, Mood & affect appropriate  Gastrointestinal: Soft, Non-tender, + BS	  Skin: No rashes, No ecchymoses, No cyanosis	  Neurologic: Grossly intact  Extremities: No clubbing, cyanosis or edema  Vascular: Peripheral pulses palpable 2+ bilaterally        LABS:	 	    CBC Full  -  ( 17 Oct 2024 09:27 )  WBC Count : 14.60 K/uL  Hemoglobin : 14.5 g/dL  Hematocrit : 42.2 %  Platelet Count - Automated : 395 K/uL  Mean Cell Volume : 64.8 fl  Mean Cell Hemoglobin : 22.3 pg  Mean Cell Hemoglobin Concentration : 34.4 gm/dL  Auto Neutrophil # : 10.97 K/uL  Auto Lymphocyte # : 1.97 K/uL  Auto Monocyte # : 1.24 K/uL  Auto Eosinophil # : 0.26 K/uL  Auto Basophil # : 0.09 K/uL  Auto Neutrophil % : 75.1 %  Auto Lymphocyte % : 13.5 %  Auto Monocyte % : 8.5 %  Auto Eosinophil % : 1.8 %  Auto Basophil % : 0.6 %    10-17    133[L]  |  96  |  16  ----------------------------<  143[H]  3.6   |  23  |  1.10  10-16    135  |  95[L]  |  17  ----------------------------<  129[H]  3.3[L]   |  22  |  1.18    Ca    8.8      17 Oct 2024 09:27  Ca    8.4      16 Oct 2024 09:11  Phos  2.6     10-17  Phos  2.6     10-16  Mg     1.8     10-17  Mg     1.6     10-16    TPro  7.2  /  Alb  4.0  /  TBili  0.8  /  DBili  x   /  AST  12  /  ALT  12  /  AlkPhos  61  10-16        TELEMETRY: SR/ST 's

## 2024-10-17 NOTE — DISCHARGE NOTE PROVIDER - HOSPITAL COURSE
HPI:  44 year old woman with a history of pHTN (group I and IV on Rimodulin and Xarelto, on home 2L O2), c/b R heart failure, history of RA thrombus s/p thrombectomy, history of DVT/PE (2016), multiple abdominal wall abscesses (s/p I&D), asthma, bradycardia s/p dual chamber PPM. Patient presents with 2 days gradual onset palpitations and SOB on exertion, associated with midsternal chest tightness that is non-radiating. ROS additionally positive for epigastric pain. She states she usually had all these symptoms in the past for pHTN and asthma exacerbation. She endorse some baseline cough and sputum production but states it is not worse than usual. She denies fever, chills, dysuria, URI symptoms.    ED: T98.5,  -> 104, BP 120s/70s, 99% 4L. Labs notable for WBC 13.08, BNP 1861 (previously 840 7/2024). CT angio w/ patchy GGO (unchanged from previous). (14 Oct 2024 06:01)    Hospital Course:  Admitted for epigastric pain and palpitations. Pulm was consulted for pHTN. RRT was called for CP and tachy while walking, similar to what brought her in. Patient had migraine headaches, started migraine cocktail but did not help, only dilaudid helped. Pulm recommended changing remodulin infusion rate to 51ng. Patient's outpatient HF physician, Dr. Chavis, was consulted. He recommended increasing spironolactone to 50mg BID and changing bumex 1 mg from IV to PO. RHC and LHC is planned for Fri 10/18.    Important Medication Changes and Reason:    Active or Pending Issues Requiring Follow-up:    Advanced Directives:   [X] Full code  [ ] DNR  [ ] Hospice    Discharge Diagnoses:         HPI:  44 year old woman with a history of pHTN (group I and IV on Rimodulin and Xarelto, on home 2L O2), c/b R heart failure, history of RA thrombus s/p thrombectomy, history of DVT/PE (2016), multiple abdominal wall abscesses (s/p I&D), asthma, bradycardia s/p dual chamber PPM. Patient presents with 2 days gradual onset palpitations and SOB on exertion, associated with midsternal chest tightness that is non-radiating. ROS additionally positive for epigastric pain. She states she usually had all these symptoms in the past for pHTN and asthma exacerbation. She endorse some baseline cough and sputum production but states it is not worse than usual. She denies fever, chills, dysuria, URI symptoms.    ED: T98.5,  -> 104, BP 120s/70s, 99% 4L. Labs notable for WBC 13.08, BNP 1861 (previously 840 7/2024). CT angio w/ patchy GGO (unchanged from previous). (14 Oct 2024 06:01)    Hospital Course:  Admitted for epigastric pain and palpitations. Pulm was consulted for pHTN. RRT was called for CP and tachy while walking, similar to what brought her in. Patient had migraine headaches, started migraine cocktail but did not help, only dilaudid helped. Pulm recommended changing remodulin infusion rate to 51ng. Patient's outpatient HF physician, Dr. Chavis, was consulted. He recommended increasing spironolactone to 50mg BID and changing bumex 1 mg from IV to PO. RHC and LHC is planned for Fri 10/18.    Important Medication Changes and Reason:    Active or Pending Issues Requiring Follow-up:  -Follow up with pulmonology  -Follow up with cardiology    Advanced Directives:   [X] Full code  [ ] DNR  [ ] Hospice    Discharge Diagnoses:  -Pulmonary hypertension           Admitted for epigastric pain and palpitations. Pulm was consulted for pHTN. RRT was called for CP and tachy while walking, similar to what brought her in. Patient had migraine headaches, started migraine cocktail but did not help, only dilaudid helped. Pulm recommended changing remodulin infusion rate to 51ng. Patient's outpatient HF physician, Dr. Chavis, was consulted. He recommended increasing spironolactone to 50mg BID and changing bumex 1 mg from IV to PO. RHC and LHC is planned for Fri 10/18.    Important Medication Changes and Reason: 44F w/chronic hypoxemic respiratory failure secondary to pulmonary hypertension w/cor pulmonale (group I + IV) on treprostinil and rivaroxaban, RA thrombus s/p thrombectomy admitted for acute on chronic RV failure, likely to be discharged later this week.       Problem/Plan - 1:  ·  Problem: Pulmonary hypertension.   ·  Plan: Admitted with RV failure  - Supplemental O2 as needed goal O2 sat > 93%  - Continue current doses of treprostinil, sildenafil, and CCBs  - Continue diuretics  - Continue prednisone 5mg daily  - Wegovy for weight loss after discharge - appreciate transplant pulm assistance arranging follow up.     Problem/Plan - 2:  ·  Problem: Pre-transplant evaluation for lung transplant.   ·  Plan: - appreciate transplant recs - pending Esophagram/fluoro of diaphragm.     Problem/Plan - 3:  ·  Problem: Need for prophylactic measure.   ·  Plan: DVT ppx - on rivaroxaban for hx DVT/PE  Diet - reg  Dispo - medically optimized after Esophagram/fluoro of diaphragm.    Attestation Statements:    Attestation Statements:  Time-based billing (NON-critical care).     42 minutes spent on total encounter. The necessity of the time spent during the encounter on this date of service was due to:         Active or Pending Issues Requiring Follow-up:  -Follow up with pulmonology  -Follow up with cardiology    Advanced Directives:   [X] Full code  [ ] DNR  [ ] Hospice    Discharge Diagnoses:  -Pulmonary hypertension           Admitted for epigastric pain and palpitations. Pulm was consulted for pHTN. RRT was called for CP and tachy while walking, similar to what brought her in. Patient had migraine headaches, started migraine cocktail but did not help, only dilaudid helped. Pulm recommended changing remodulin infusion rate to 51ng. Patient's outpatient HF physician, Dr. Chavis, was consulted. He recommended increasing spironolactone to 50mg BID and changing bumex 1 mg from IV to PO. RHC and LHC is planned for Fri 10/18.    Important Medication Changes and Reason: 44F w/chronic hypoxemic respiratory failure secondary to pulmonary hypertension w/cor pulmonale (group I + IV) on treprostinil and rivaroxaban, RA thrombus s/p thrombectomy admitted for acute on chronic RV failure       Problem/Plan - 1:  ·  Problem: Pulmonary hypertension.   ·  Plan: Admitted with RV failure  - Supplemental O2 as needed goal O2 sat > 93%  - Continue current doses of treprostinil, sildenafil, and CCBs  - Continue diuretics  - Continue prednisone 5mg daily  - Wegovy for weight loss after discharge - appreciate transplant pulm assistance arranging follow up.     Problem/Plan - 2:  ·  Problem: Pre-transplant evaluation for lung transplant.   ·  Plan: - appreciate transplant recs - pending Esophagram/fluoro of diaphragm.     Problem/Plan - 3:  ·  Problem: Need for prophylactic measure.   ·  Plan: DVT ppx - on rivaroxaban for hx DVT/PE  Diet - reg        Active or Pending Issues Requiring Follow-up:  -Follow up with pulmonology  -Follow up with cardiology    Advanced Directives:   [X] Full code  [ ] DNR  [ ] Hospice    Discharge Diagnoses:  -Pulmonary hypertension

## 2024-10-17 NOTE — PROGRESS NOTE ADULT - ASSESSMENT
43 y/o female with PMH of pulmonary HTN (group I and IV on Rimodulin and Xarelto, on home 2L O2), c/b R heart failure, history of RA thrombus s/p thrombectomy, history of DVT/PE (2016), multiple abdominal wall abscesses (s/p I&D), asthma, bradycardia s/p dual chamber PPM, s/p Adenosine Sensitive SVT Ablation. She presents with worsening palpitations and SOB on exertion. Labs show elevated pro-BNP at 1861, which increased from 840 in July 2024. Pt chest ct unchanged from July 2024. Concerning for pHTN exacerbation complicated by right heart failure. EPS consulted regarding evaluation of pacemaker parameters for tachycardia and palpitations.    1. Sinus Tachycardia, Palpitations  2. pHTN  3. Volume Overload  4. Hx of BSC PPM for Bradycardia    - Palpitations correlating to sinus tachycardia with -150's at times   - PPM was interrogated on 10/14/24 that showed 6 SVT and 2 nonsustained episodes, available EGMs c/w sinus tachycardia with ventricular rates of 160s  - Pacemaker parameter DDD @ 60 ppm, with ventricular tachy monitor set at 160 bpm  - Patient with history of chronic sinus tachycardia iso of pHTN/Asthma  - Was on Xarelto, d/cd on 10/14  - HF following, being diuresed  - Plan is for RHC and LHC 10/18  - As d/w Dr Chavis, prefer not to start betablocker d/t pHTN  - Would not adjust ventricular tachycardia detection rate to avoid storing sinus tachycardia. Will keep detection rate at 160bpm.   - No further EP workup as inpatient. EP will sign off. Reconsult as needed.  - Discussed with EP attending.     AVRIL Corey NP-C  [phone]

## 2024-10-17 NOTE — DISCHARGE NOTE PROVIDER - NSDCCPTREATMENT_GEN_ALL_CORE_FT
PRINCIPAL PROCEDURE  Procedure: Echocardiography, 2D  Findings and Treatment:   < end of copied text >  CONCLUSIONS:      1. Technically difficult image quality.   2. Left ventricular cavity is small. The interventricular septum is flattened in systole and diastole consistent with right ventricular pressure and volume overload. Left ventricular systolic function is hyperdynamic with an ejection fraction visually estimated at > 70 %. There is poor visualization of the endocardial borders to determine the presence of wall motion abnormalities.   3. The right ventricle is not well visualized. enlarged right ventricular cavity size and reduced right ventricular systolic function.   4. Mild to moderate pulmonic regurgitation.   5. Pulmonary artery systolic pressure could not be estimated.   6. Compared to the transthoracic echocardiogram performed on 7/16/2024, the right ventricle was not as well visualized on this study. There is again echocardiographic evidence of right ventricular failure. Quantification of severity is limited.< from: TTE W or WO Ultrasound Enhancing Agent (10.14.24 @ 17:00) >

## 2024-10-17 NOTE — PROGRESS NOTE ADULT - SUBJECTIVE AND OBJECTIVE BOX
Internal Medicine Progress Note    Pt is a 44 year old woman with a history of pHTN (group I and IV on Rimodulin and Xarelto, on home 2L O2), c/b R heart failure, history of RA thrombus s/p thrombectomy, history of DVT/PE (2016), multiple abdominal wall abscesses (s/p I&D), asthma, bradycardia s/p dual chamber PPM. She presents with worsening palpitations and SOB on exertion.      OVERNIGHT EVENTS/INTERVAL HPI:  Pt seen at bedside. No overnight events. Pt states her SOB has improved. Pt reports that she feels mild palpitation this morning, but no epigastric pain.  Pt states that her epigastric pain and chest palpitations has improved since the rapid the prior day.  Pt continues to report diffuse 7/10 throbbing headache with nausea. She denies any vision changes and photophobia. Pt states that the sumatriptan yesterday afternoon did not help, but dilaudid helped. She requests regular diet. Pt denies any vomiting, diarrhea, dysuria, or chest pain.       OBJECTIVE:  Vital Signs Last 24 Hrs  T(C): 36.8 (16 Oct 2024 11:00), Max: 36.8 (16 Oct 2024 05:51)  T(F): 98.3 (16 Oct 2024 11:00), Max: 98.3 (16 Oct 2024 11:00)  HR: 91 (16 Oct 2024 11:00) (91 - 108)  BP: 100/69 (16 Oct 2024 11:00) (100/69 - 134/79)  BP(mean): 100 (15 Oct 2024 16:37) (100 - 100)  RR: 18 (16 Oct 2024 11:00) (18 - 18)  SpO2: 97% (16 Oct 2024 11:00) (93% - 99%)    Parameters below as of 16 Oct 2024 11:00  Patient On (Oxygen Delivery Method): nasal cannula    I&O's Detail    15 Oct 2024 07:01  -  16 Oct 2024 07:00  --------------------------------------------------------  IN:    freetext medication - Infusion: 0.5 mL    Oral Fluid: 1200 mL  Total IN: 1200.5 mL    OUT:    Voided (mL): 1500 mL  Total OUT: 1500 mL    Total NET: -299.5 mL      16 Oct 2024 07:01  -  16 Oct 2024 13:53  --------------------------------------------------------  IN:    freetext medication - Infusion: 0.2 mL    Oral Fluid: 620 mL  Total IN: 620.2 mL    OUT:  Total OUT: 0 mL    Total NET: 620.2 mL        Physical Exam:  GENERAL: Awake, alert and interactive, increased work of breathing, appears comfortable  NEURO: A&Ox4, no focal deficits, 5/5 strength in all ext, reflexes 2+ throughout, CN 2-12 intact  HEENT: Normocephalic, atraumatic, no conjunctivitis or scleral icterus, oral mucosa moist, no oral lesions noted  NECK: Supple, no LAD, no JVD, thyroid not palpable  CARDIAC: Tachycardic rate, Regular rhythm, +S1/S2, no murmurs/rubs/gallops  PULM: +Increased work of breath, clear to auscultation bilaterally, +Expiratory wheeze bilaterally, No rales/rhonchi  ABDOMEN: Soft, +epigastric tenderness to deep palpation, nondistended, +bs, no hepatosplenomegaly, no rebound tenderness or fluid wave, no CVA tenderness  MSK: Range of motion grossly intact, no back tenderness  SKIN: Warm and dry, no rashes, lesions  VASC: Cap refil < 2 sec, 2+ peripheral pulses, no edema, no LE tenderness  Psych: Appropriate affect      MEDICATIONS  (STANDING):  buDESOnide    Inhalation Suspension 0.5 milliGRAM(s) Inhalation two times a day  influenza   Vaccine 0.5 milliLiter(s) IntraMuscular once  ipratropium    for Nebulization 500 MICROGram(s) Nebulizer every 6 hours  montelukast 10 milliGRAM(s) Oral daily  pantoprazole    Tablet 40 milliGRAM(s) Oral before breakfast  predniSONE   Tablet 10 milliGRAM(s) Oral daily  rivaroxaban 20 milliGRAM(s) Oral with dinner  sildenafil (REVATIO) 20 milliGRAM(s) Oral <User Schedule>  spironolactone 50 milliGRAM(s) Oral daily  sucralfate 1 Gram(s) Oral two times a day  Treprostinil  SubQ continuous infusion 48 NANOGram(s)/kG/Min 0.052 mL/Hr (0.05 mL/Hr) IV Continuous <Continuous>    MEDICATIONS  (PRN):  acetaminophen     Tablet .. 650 milliGRAM(s) Oral every 6 hours PRN Temp greater or equal to 38C (100.4F), Mild Pain (1 - 3)  aluminum hydroxide/magnesium hydroxide/simethicone Suspension 30 milliLiter(s) Oral every 4 hours PRN Dyspepsia        Labs:                                   14.4   11.98 )-----------( 368      ( 16 Oct 2024 09:11 )             46.6     10-16    135  |  95[L]  |  17  ----------------------------<  129[H]  3.3[L]   |  22  |  1.18    Ca    8.4      16 Oct 2024 09:11  Phos  2.6     10-16  Mg     1.6     10-16    TPro  7.2  /  Alb  4.0  /  TBili  0.8  /  DBili  x   /  AST  12  /  ALT  12  /  AlkPhos  61  10-16        Radiology:   < from: CT Angio Chest PE Protocol w/ IV Cont (10.14.24 @ 01:08) >  Mixing artifact in the main pulmonary arteries degrading evaluation. No   evidence of acute pulmonary embolism within the confines of this exam.    Patchy groundglass opacities in both lungs similar to that seen on prior   exam. Nonspecific finding could represent pulmonary edema or may be   infectious/inflammatory.    Dilated pulmonary arteries in keeping with patient's known pulmonary   hypertension.    < end of copied text >    < from: POCUS ED TTE 2D F/U, Limited w/o Cont. (10.14.24 @ 04:28) >  IMPRESSION:  Trace pericardial effusion with no sonographic signs of tamponade.  Severe right ventricular dilation.    < end of copied text >      < from: TTE W or WO Ultrasound Enhancing Agent (10.14.24 @ 17:00) >  CONCLUSIONS:      1. Technically difficult image quality.   2. Left ventricular cavity is small. The interventricular septum is flattened in systole and diastole consistent with right ventricular pressure and volume overload. Left ventricular systolic function is hyperdynamic with an ejection fraction visually estimated at > 70 %. There is poor visualization of the endocardial borders to determine the presence of wall motion abnormalities.   3. The right ventricle is not well visualized. enlarged right ventricular cavity size and reduced right ventricular systolic function.   4. Mild to moderate pulmonic regurgitation.   5. Pulmonary artery systolic pressure could not be estimated.   6. Compared to the transthoracic echocardiogram performed on 7/16/2024, the right ventricle was not as well visualized on this study. There is again echocardiographic evidence of right ventricular failure. Quantification of severity is limited.    < end of copied text >     Internal Medicine Progress Note    Pt is a 44 year old woman with a history of pHTN (group I and IV on Rimodulin and Xarelto, on home 2L O2), c/b R heart failure, history of RA thrombus s/p thrombectomy, history of DVT/PE (2016), multiple abdominal wall abscesses (s/p I&D), asthma, bradycardia s/p dual chamber PPM. She presents with worsening palpitations and SOB on exertion.      OVERNIGHT EVENTS/INTERVAL HPI:  Pt seen at bedside. No overnight events. Pt states her SOB has improved. Pt reports that she feels mild palpitation this morning, but no epigastric pain.  Pt states that her epigastric pain and chest palpitations has improved since the rapid the prior day.  Pt continues to report diffuse 7/10 throbbing headache with nausea. She denies any vision changes and photophobia. Pt states that the sumatriptan yesterday afternoon did not help, but dilaudid helped. She requests regular diet. Pt denies any vomiting, diarrhea, dysuria, or chest pain.       OBJECTIVE:  Vital Signs Last 24 Hrs  T(C): 36.6 (17 Oct 2024 05:10), Max: 36.8 (16 Oct 2024 11:00)  T(F): 97.8 (17 Oct 2024 05:10), Max: 98.3 (16 Oct 2024 11:00)  HR: 105 (17 Oct 2024 05:10) (91 - 109)  BP: 105/65 (17 Oct 2024 05:10) (99/66 - 132/90)  BP(mean): 77 (16 Oct 2024 20:35) (77 - 104)  RR: 18 (17 Oct 2024 05:10) (18 - 18)  SpO2: 100% (17 Oct 2024 05:10) (97% - 100%)    Parameters below as of 17 Oct 2024 05:10  Patient On (Oxygen Delivery Method): nasal cannula  O2 Flow (L/min): 2    I&O's Detail    16 Oct 2024 07:01  -  17 Oct 2024 07:00  --------------------------------------------------------  IN:    freetext medication - Infusion: 0.2 mL    freetext medication - Infusion: 1.1 mL    Oral Fluid: 1250 mL  Total IN: 1251.3 mL    OUT:    Voided (mL): 1200 mL  Total OUT: 1200 mL    Total NET: 51.3 mL        Physical Exam:  GENERAL: Awake, alert and interactive, increased work of breathing, appears comfortable  NEURO: A&Ox4, no focal deficits, 5/5 strength in all ext, reflexes 2+ throughout, CN 2-12 intact  HEENT: Normocephalic, atraumatic, no conjunctivitis or scleral icterus, oral mucosa moist, no oral lesions noted  NECK: Supple, no LAD, no JVD, thyroid not palpable  CARDIAC: Tachycardic rate, Regular rhythm, +S1/S2, no murmurs/rubs/gallops  PULM: +Increased work of breath, clear to auscultation bilaterally, +Expiratory wheeze bilaterally, No rales/rhonchi  ABDOMEN: Soft, +epigastric tenderness to deep palpation, nondistended, +bs, no hepatosplenomegaly, no rebound tenderness or fluid wave, no CVA tenderness  MSK: Range of motion grossly intact, no back tenderness  SKIN: Warm and dry, no rashes, lesions  VASC: Cap refil < 2 sec, 2+ peripheral pulses, no edema, no LE tenderness  Psych: Appropriate affect      MEDICATIONS  (STANDING):  buDESOnide    Inhalation Suspension 0.5 milliGRAM(s) Inhalation two times a day  influenza   Vaccine 0.5 milliLiter(s) IntraMuscular once  ipratropium    for Nebulization 500 MICROGram(s) Nebulizer every 6 hours  montelukast 10 milliGRAM(s) Oral daily  pantoprazole    Tablet 40 milliGRAM(s) Oral before breakfast  predniSONE   Tablet 10 milliGRAM(s) Oral daily  rivaroxaban 20 milliGRAM(s) Oral with dinner  sildenafil (REVATIO) 20 milliGRAM(s) Oral <User Schedule>  spironolactone 50 milliGRAM(s) Oral daily  sucralfate 1 Gram(s) Oral two times a day  Treprostinil  SubQ continuous infusion 48 NANOGram(s)/kG/Min 0.052 mL/Hr (0.05 mL/Hr) IV Continuous <Continuous>    MEDICATIONS  (PRN):  acetaminophen     Tablet .. 650 milliGRAM(s) Oral every 6 hours PRN Temp greater or equal to 38C (100.4F), Mild Pain (1 - 3)  aluminum hydroxide/magnesium hydroxide/simethicone Suspension 30 milliLiter(s) Oral every 4 hours PRN Dyspepsia        Labs:                                   14.4   11.98 )-----------( 368      ( 16 Oct 2024 09:11 )             46.6     10-16    135  |  95[L]  |  17  ----------------------------<  129[H]  3.3[L]   |  22  |  1.18    Ca    8.4      16 Oct 2024 09:11  Phos  2.6     10-16  Mg     1.6     10-16    TPro  7.2  /  Alb  4.0  /  TBili  0.8  /  DBili  x   /  AST  12  /  ALT  12  /  AlkPhos  61  10-16        Radiology:   < from: CT Angio Chest PE Protocol w/ IV Cont (10.14.24 @ 01:08) >  Mixing artifact in the main pulmonary arteries degrading evaluation. No   evidence of acute pulmonary embolism within the confines of this exam.    Patchy groundglass opacities in both lungs similar to that seen on prior   exam. Nonspecific finding could represent pulmonary edema or may be   infectious/inflammatory.    Dilated pulmonary arteries in keeping with patient's known pulmonary   hypertension.    < end of copied text >    < from: POCUS ED TTE 2D F/U, Limited w/o Cont. (10.14.24 @ 04:28) >  IMPRESSION:  Trace pericardial effusion with no sonographic signs of tamponade.  Severe right ventricular dilation.    < end of copied text >      < from: TTE W or WO Ultrasound Enhancing Agent (10.14.24 @ 17:00) >  CONCLUSIONS:      1. Technically difficult image quality.   2. Left ventricular cavity is small. The interventricular septum is flattened in systole and diastole consistent with right ventricular pressure and volume overload. Left ventricular systolic function is hyperdynamic with an ejection fraction visually estimated at > 70 %. There is poor visualization of the endocardial borders to determine the presence of wall motion abnormalities.   3. The right ventricle is not well visualized. enlarged right ventricular cavity size and reduced right ventricular systolic function.   4. Mild to moderate pulmonic regurgitation.   5. Pulmonary artery systolic pressure could not be estimated.   6. Compared to the transthoracic echocardiogram performed on 7/16/2024, the right ventricle was not as well visualized on this study. There is again echocardiographic evidence of right ventricular failure. Quantification of severity is limited.    < end of copied text >     Internal Medicine Progress Note    Pt is a 44 year old woman with a history of pHTN (group I and IV on Rimodulin and Xarelto, on home 2L O2), c/b R heart failure, history of RA thrombus s/p thrombectomy, history of DVT/PE (2016), multiple abdominal wall abscesses (s/p I&D), asthma, bradycardia s/p dual chamber PPM. She presents with worsening palpitations and SOB on exertion.      OVERNIGHT EVENTS/INTERVAL HPI: NAEON. Pt examined at bedside this morning. She reports walking in the hallway yesterday and sitting up in chair. States she felt tachycardic/palpitations while walking, but did not experience dyspnea or chest pain. Endorses 8/10 throbbing headache this morning, improving with dilaudid. She has been unable to receive her home sildenafil. Has not had any BM yet as she is unable to ambulate to bathroom. Reports urinating frequently 2/2 diuresis. Denies abdominal pain.      OBJECTIVE:  Vital Signs Last 24 Hrs  T(C): 36.6 (17 Oct 2024 05:10), Max: 36.8 (16 Oct 2024 11:00)  T(F): 97.8 (17 Oct 2024 05:10), Max: 98.3 (16 Oct 2024 11:00)  HR: 105 (17 Oct 2024 05:10) (91 - 109)  BP: 105/65 (17 Oct 2024 05:10) (99/66 - 132/90)  BP(mean): 77 (16 Oct 2024 20:35) (77 - 104)  RR: 18 (17 Oct 2024 05:10) (18 - 18)  SpO2: 100% (17 Oct 2024 05:10) (97% - 100%)    Parameters below as of 17 Oct 2024 05:10  Patient On (Oxygen Delivery Method): nasal cannula  O2 Flow (L/min): 2    I&O's Detail    16 Oct 2024 07:01  -  17 Oct 2024 07:00  --------------------------------------------------------  IN:    freetext medication - Infusion: 0.2 mL    freetext medication - Infusion: 1.1 mL    Oral Fluid: 1250 mL  Total IN: 1251.3 mL    OUT:    Voided (mL): 1200 mL  Total OUT: 1200 mL    Total NET: 51.3 mL        Physical Exam:  GENERAL: Awake, alert and interactive, appears comfortable  NEURO: A&Ox4, no focal deficits, 5/5 strength in all ext, reflexes 2+ throughout, CN 2-12 intact  HEENT: Normocephalic, atraumatic, no conjunctivitis or scleral icterus, oral mucosa moist, no oral lesions noted  NECK: Supple, no LAD, no JVD, thyroid not palpable  CARDIAC: Tachycardic rate, Regular rhythm, +S1/S2, no murmurs/rubs/gallops  PULM: +Increased work of breath, clear to auscultation bilaterally, +Expiratory wheeze bilaterally, No rales/rhonchi  ABDOMEN: Soft, +epigastric tenderness to deep palpation, nondistended, +bs, no hepatosplenomegaly, no rebound tenderness or fluid wave, no CVA tenderness  MSK: Range of motion grossly intact, no back tenderness  SKIN: Warm and dry, no rashes, lesions  VASC: Cap refil < 2 sec, 2+ peripheral pulses, no edema, no LE tenderness  Psych: Appropriate affect      MEDICATIONS  (STANDING):  buDESOnide    Inhalation Suspension 0.5 milliGRAM(s) Inhalation two times a day  influenza   Vaccine 0.5 milliLiter(s) IntraMuscular once  ipratropium    for Nebulization 500 MICROGram(s) Nebulizer every 6 hours  montelukast 10 milliGRAM(s) Oral daily  pantoprazole    Tablet 40 milliGRAM(s) Oral before breakfast  predniSONE   Tablet 10 milliGRAM(s) Oral daily  rivaroxaban 20 milliGRAM(s) Oral with dinner  sildenafil (REVATIO) 20 milliGRAM(s) Oral <User Schedule>  spironolactone 50 milliGRAM(s) Oral daily  sucralfate 1 Gram(s) Oral two times a day  Treprostinil  SubQ continuous infusion 48 NANOGram(s)/kG/Min 0.052 mL/Hr (0.05 mL/Hr) IV Continuous <Continuous>    MEDICATIONS  (PRN):  acetaminophen     Tablet .. 650 milliGRAM(s) Oral every 6 hours PRN Temp greater or equal to 38C (100.4F), Mild Pain (1 - 3)  aluminum hydroxide/magnesium hydroxide/simethicone Suspension 30 milliLiter(s) Oral every 4 hours PRN Dyspepsia        Labs:                                   14.4   11.98 )-----------( 368      ( 16 Oct 2024 09:11 )             46.6     10-16    135  |  95[L]  |  17  ----------------------------<  129[H]  3.3[L]   |  22  |  1.18    Ca    8.4      16 Oct 2024 09:11  Phos  2.6     10-16  Mg     1.6     10-16    TPro  7.2  /  Alb  4.0  /  TBili  0.8  /  DBili  x   /  AST  12  /  ALT  12  /  AlkPhos  61  10-16        Radiology:   < from: CT Angio Chest PE Protocol w/ IV Cont (10.14.24 @ 01:08) >  Mixing artifact in the main pulmonary arteries degrading evaluation. No   evidence of acute pulmonary embolism within the confines of this exam.    Patchy groundglass opacities in both lungs similar to that seen on prior   exam. Nonspecific finding could represent pulmonary edema or may be   infectious/inflammatory.    Dilated pulmonary arteries in keeping with patient's known pulmonary   hypertension.    < end of copied text >    < from: POCUS ED TTE 2D F/U, Limited w/o Cont. (10.14.24 @ 04:28) >  IMPRESSION:  Trace pericardial effusion with no sonographic signs of tamponade.  Severe right ventricular dilation.    < end of copied text >      < from: TTE W or WO Ultrasound Enhancing Agent (10.14.24 @ 17:00) >  CONCLUSIONS:      1. Technically difficult image quality.   2. Left ventricular cavity is small. The interventricular septum is flattened in systole and diastole consistent with right ventricular pressure and volume overload. Left ventricular systolic function is hyperdynamic with an ejection fraction visually estimated at > 70 %. There is poor visualization of the endocardial borders to determine the presence of wall motion abnormalities.   3. The right ventricle is not well visualized. enlarged right ventricular cavity size and reduced right ventricular systolic function.   4. Mild to moderate pulmonic regurgitation.   5. Pulmonary artery systolic pressure could not be estimated.   6. Compared to the transthoracic echocardiogram performed on 7/16/2024, the right ventricle was not as well visualized on this study. There is again echocardiographic evidence of right ventricular failure. Quantification of severity is limited.    < end of copied text >     Internal Medicine Progress Note    Pt is a 44 year old woman with a history of pHTN (group I and IV on Rimodulin and Xarelto, on home 2L O2), c/b R heart failure, history of RA thrombus s/p thrombectomy, history of DVT/PE (2016), multiple abdominal wall abscesses (s/p I&D), asthma, bradycardia s/p dual chamber PPM. She presents with worsening palpitations and SOB on exertion.      OVERNIGHT EVENTS/INTERVAL HPI: NAEON. Pt examined at bedside this morning. She reports walking in the hallway yesterday and sitting up in chair. States she felt tachycardic/palpitations while walking, but did not experience dyspnea or chest pain. Endorses 8/10 throbbing headache this morning, improving with dilaudid. She has been unable to receive her home sildenafil, but states her  should be brining it today. Has not had any BM yet as she is unable to ambulate to bathroom. Reports urinating frequently 2/2 diuresis. Denies abdominal pain.      OBJECTIVE:  Vital Signs Last 24 Hrs  T(C): 36.6 (17 Oct 2024 05:10), Max: 36.8 (16 Oct 2024 11:00)  T(F): 97.8 (17 Oct 2024 05:10), Max: 98.3 (16 Oct 2024 11:00)  HR: 105 (17 Oct 2024 05:10) (91 - 109)  BP: 105/65 (17 Oct 2024 05:10) (99/66 - 132/90)  BP(mean): 77 (16 Oct 2024 20:35) (77 - 104)  RR: 18 (17 Oct 2024 05:10) (18 - 18)  SpO2: 100% (17 Oct 2024 05:10) (97% - 100%)    Parameters below as of 17 Oct 2024 05:10  Patient On (Oxygen Delivery Method): nasal cannula  O2 Flow (L/min): 2    I&O's Detail    16 Oct 2024 07:01  -  17 Oct 2024 07:00  --------------------------------------------------------  IN:    freetext medication - Infusion: 0.2 mL    freetext medication - Infusion: 1.1 mL    Oral Fluid: 1250 mL  Total IN: 1251.3 mL    OUT:    Voided (mL): 1200 mL  Total OUT: 1200 mL    Total NET: 51.3 mL        Physical Exam:  GENERAL: Awake, alert and interactive, appears comfortable  NEURO: A&Ox4, no focal deficits, 5/5 strength in all ext, reflexes 2+ throughout, CN 2-12 intact  HEENT: Normocephalic, atraumatic, no conjunctivitis or scleral icterus, oral mucosa moist, no oral lesions noted  NECK: Supple, no LAD, no JVD, thyroid not palpable  CARDIAC: Tachycardic rate, Regular rhythm, +S1/S2, no murmurs/rubs/gallops  PULM: +Increased work of breath, clear to auscultation bilaterally, +Expiratory wheeze bilaterally, No rales/rhonchi  ABDOMEN: Soft, +epigastric tenderness to deep palpation, nondistended, +bs, no hepatosplenomegaly, no rebound tenderness or fluid wave, no CVA tenderness  MSK: Range of motion grossly intact, no back tenderness  SKIN: Warm and dry, no rashes, lesions  VASC: Cap refil < 2 sec, 2+ peripheral pulses, no edema, no LE tenderness  Psych: Appropriate affect      MEDICATIONS  (STANDING):  buDESOnide    Inhalation Suspension 0.5 milliGRAM(s) Inhalation two times a day  influenza   Vaccine 0.5 milliLiter(s) IntraMuscular once  ipratropium    for Nebulization 500 MICROGram(s) Nebulizer every 6 hours  montelukast 10 milliGRAM(s) Oral daily  pantoprazole    Tablet 40 milliGRAM(s) Oral before breakfast  predniSONE   Tablet 10 milliGRAM(s) Oral daily  rivaroxaban 20 milliGRAM(s) Oral with dinner  sildenafil (REVATIO) 20 milliGRAM(s) Oral <User Schedule>  spironolactone 50 milliGRAM(s) Oral daily  sucralfate 1 Gram(s) Oral two times a day  Treprostinil  SubQ continuous infusion 48 NANOGram(s)/kG/Min 0.052 mL/Hr (0.05 mL/Hr) IV Continuous <Continuous>    MEDICATIONS  (PRN):  acetaminophen     Tablet .. 650 milliGRAM(s) Oral every 6 hours PRN Temp greater or equal to 38C (100.4F), Mild Pain (1 - 3)  aluminum hydroxide/magnesium hydroxide/simethicone Suspension 30 milliLiter(s) Oral every 4 hours PRN Dyspepsia        Labs:                                   14.4   11.98 )-----------( 368      ( 16 Oct 2024 09:11 )             46.6     10-16    135  |  95[L]  |  17  ----------------------------<  129[H]  3.3[L]   |  22  |  1.18    Ca    8.4      16 Oct 2024 09:11  Phos  2.6     10-16  Mg     1.6     10-16    TPro  7.2  /  Alb  4.0  /  TBili  0.8  /  DBili  x   /  AST  12  /  ALT  12  /  AlkPhos  61  10-16        Radiology:   < from: CT Angio Chest PE Protocol w/ IV Cont (10.14.24 @ 01:08) >  Mixing artifact in the main pulmonary arteries degrading evaluation. No   evidence of acute pulmonary embolism within the confines of this exam.    Patchy groundglass opacities in both lungs similar to that seen on prior   exam. Nonspecific finding could represent pulmonary edema or may be   infectious/inflammatory.    Dilated pulmonary arteries in keeping with patient's known pulmonary   hypertension.    < end of copied text >    < from: POCUS ED TTE 2D F/U, Limited w/o Cont. (10.14.24 @ 04:28) >  IMPRESSION:  Trace pericardial effusion with no sonographic signs of tamponade.  Severe right ventricular dilation.    < end of copied text >      < from: TTE W or WO Ultrasound Enhancing Agent (10.14.24 @ 17:00) >  CONCLUSIONS:      1. Technically difficult image quality.   2. Left ventricular cavity is small. The interventricular septum is flattened in systole and diastole consistent with right ventricular pressure and volume overload. Left ventricular systolic function is hyperdynamic with an ejection fraction visually estimated at > 70 %. There is poor visualization of the endocardial borders to determine the presence of wall motion abnormalities.   3. The right ventricle is not well visualized. enlarged right ventricular cavity size and reduced right ventricular systolic function.   4. Mild to moderate pulmonic regurgitation.   5. Pulmonary artery systolic pressure could not be estimated.   6. Compared to the transthoracic echocardiogram performed on 7/16/2024, the right ventricle was not as well visualized on this study. There is again echocardiographic evidence of right ventricular failure. Quantification of severity is limited.    < end of copied text >

## 2024-10-17 NOTE — DISCHARGE NOTE PROVIDER - NSDCFUSCHEDAPPT_GEN_ALL_CORE_FT
[facility]  PULED [address]  Scheduled Appointment: 12/13/2024    [facility]  PULED [address]  Scheduled Appointment: 12/13/2024    Vivian Yoon  [facility]  PULED [address]  Scheduled Appointment: 12/13/2024    [facility]  ELECTROPH [id number]  Scheduled Appointment: 12/17/2024     [facility]  PULMMED [address]  Scheduled Appointment: 12/13/2024    [facility]  PULMMED [address]  Scheduled Appointment: 12/13/2024    Vivian Yoon  [facility]  PULED [address]  Scheduled Appointment: 12/13/2024    [facility]  ELECTROPH [id number]  Scheduled Appointment: 12/17/2024    [facility]  INTMED OP [address] [place] [address]   Scheduled Appointment: 12/17/2024

## 2024-10-17 NOTE — DISCHARGE NOTE PROVIDER - ATTENDING DISCHARGE PHYSICAL EXAMINATION:
LOS: 16d    VITALS:   T(C): 36.8 (10-30-24 @ 05:00), Max: 37.1 (10-29-24 @ 19:40)  HR: 85 (10-30-24 @ 14:12) (85 - 103)  BP: 106/74 (10-30-24 @ 14:12) (103/71 - 132/89)  RR: 18 (10-30-24 @ 14:12) (18 - 19)  SpO2: 95% (10-30-24 @ 14:12) (90% - 98%)    GENERAL: NAD, sitting up in a chair on nasal cannula  HEAD:  Atraumatic, Normocephalic  EYES: EOMI, PERRLA, conjunctiva and sclera clear  ENT: Moist mucous membranes  NECK: Supple, No JVD  CHEST/LUNG: Clear to auscultation bilaterally; No rales, rhonchi, wheezing, or rubs. Unlabored respirations  HEART: Regular rate and rhythm; No murmurs, rubs, or gallops  ABDOMEN: BSx4; Soft, nontender, nondistended  EXTREMITIES:  2+ Peripheral Pulses, brisk capillary refill. No clubbing, cyanosis, or edema  NERVOUS SYSTEM:  A&Ox3, no focal deficits   SKIN: No rashes or lesions

## 2024-10-17 NOTE — DISCHARGE NOTE PROVIDER - ATTENDING ATTESTATION STATEMENT
I have personally seen and examined the patient. I have collaborated with and supervised the no concerns

## 2024-10-17 NOTE — DISCHARGE NOTE PROVIDER - NSDCCPCAREPLAN_GEN_ALL_CORE_FT
PRINCIPAL DISCHARGE DIAGNOSIS  Diagnosis: Pulmonary hypertension  Assessment and Plan of Treatment: You came to the hospital for worsening of your pulmonary hypertension. You were treated with diuretics and the infusion rate of your remodulin was changed to 51ng by the pulmonology team. Your cardiologist Dr. Chavis also saw you and recommended increasing your spironolactone to twice a day. The pulmonology and cardiology teams recommended a right heart catheterization to evaluate your pulmonary hypertension. Please follow up with your outpatient doctors and continue to take your prescribed medications. If you experience worsening chest pain, shortness of breath, heart palpitations, or other new symptoms, please come back to the ED.

## 2024-10-17 NOTE — DISCHARGE NOTE PROVIDER - NSDCMRMEDTOKEN_GEN_ALL_CORE_FT
Atrovent 18 mcg/inh inhalation aerosol: 1 inhaled 4 times a day as needed for  shortness of breath and/or wheezing  omeprazole 40 mg oral delayed release capsule: 1 cap(s) orally once a day  predniSONE 10 mg oral tablet: 1 tab(s) orally once a day  Remodulin 5 mg/mL injectable solution: 1 application injectable once a day patient is taking 48ng/kg/min via her own SQ PUMP  rivaroxaban 20 mg oral tablet: 1 tab(s) orally once a day (before a meal)  shower chair: ICD-10: R53.1  sildenafil 20 mg oral tablet: 1 tab(s) orally every 8 hours  spironolactone 25 mg oral tablet: 2 tab(s) orally once a day  torsemide 20 mg oral tablet: 1 tab(s) orally once a day   acetaminophen 325 mg oral tablet: 2 tab(s) orally every 6 hours As needed Temp greater or equal to 38C (100.4F), Mild Pain (1 - 3)  Atrovent 18 mcg/inh inhalation aerosol: 1 inhaled 4 times a day as needed for  shortness of breath and/or wheezing  bumetanide 1 mg oral tablet: 1 tab(s) orally once a day  montelukast 10 mg oral tablet: 1 tab(s) orally once a day  omeprazole 40 mg oral delayed release capsule: 1 cap(s) orally once a day  Ozempic 2 mg/3 mL (0.25 mg or 0.5 mg dose) subcutaneous solution: 0.25 milligram(s) subcutaneously once a week Start at 0.25mg with protocol up-titration as directed monthly  polyethylene glycol 3350 oral powder for reconstitution: 17 gram(s) orally once a day  predniSONE 5 mg oral tablet: 1 tab(s) orally once a day  Remodulin 5 mg/mL injectable solution: 1 application injectable once a day patient is taking 48ng/kg/min via her own SQ PUMP  rivaroxaban 20 mg oral tablet: 1 tab(s) orally once a day (before a meal)  senna leaf extract oral tablet: 2 tab(s) orally once a day (at bedtime)  shower chair: ICD-10: R53.1  sildenafil 20 mg oral tablet: 1 tab(s) orally every 8 hours  SUMAtriptan 50 mg oral tablet: 1 tab(s) orally every 12 hours as needed for Moderate to Severe Headache   acetaminophen 325 mg oral tablet: 2 tab(s) orally every 6 hours As needed Temp greater or equal to 38C (100.4F), Mild Pain (1 - 3)  Atrovent 18 mcg/inh inhalation aerosol: 1 inhaled 4 times a day as needed for  shortness of breath and/or wheezing  budesonide 0.5 mg/2 mL inhalation suspension: 2 milliliter(s) by nebulizer 2 times a day  bumetanide 1 mg oral tablet: 1 tab(s) orally once a day  folic acid 1 mg oral tablet: 1 tab(s) orally once a day  montelukast 10 mg oral tablet: 1 tab(s) orally once a day  NIFEdipine 30 mg oral tablet, extended release: 1 tab(s) orally once a day at noon  omeprazole 40 mg oral delayed release capsule: 1 cap(s) orally once a day  Ozempic 2 mg/3 mL (0.25 mg or 0.5 mg dose) subcutaneous solution: 0.25 milligram(s) subcutaneously once a week Start at 0.25mg with protocol up-titration as directed monthly  polyethylene glycol 3350 oral powder for reconstitution: 17 gram(s) orally once a day  predniSONE 5 mg oral tablet: 1 tab(s) orally once a day  Remodulin 5 mg/mL injectable solution: 1 application injectable once a day patient is taking 48ng/kg/min via her own SQ PUMP  rivaroxaban 20 mg oral tablet: 1 tab(s) orally once a day (before a meal)  senna leaf extract oral tablet: 2 tab(s) orally once a day (at bedtime)  sildenafil 20 mg oral tablet: 1 tab(s) orally every 8 hours  spironolactone 25 mg oral tablet: 2 tab(s) orally 2 times a day  Treprostinil GTT- patient own pump: pump

## 2024-10-17 NOTE — PROGRESS NOTE ADULT - PROBLEM SELECTOR PLAN 2
-History of RV failure iso pHTN, presented with worsening SOUZA, on exam with JVD  - pro-BNP elevated at 1861  - Home regimen: Aldactone 25mg QD, Torsemide 20mg QD  - s/p Torsemide 20mg PO + 1mg Bumex (10/14) in ED.   -  TTE on 10/14 shows enlarged right ventricular cavity size and reduced right ventricular systolic function with evidence of right ventricular failure. EF 70%.   - Standing weight on 10/14: 97.6 kg    Plan:  - Consulted Dr. Chavis (outpatient HF) and EP  - S/p Bumex 1mg IV BID - transitioned to home torsemide 20 mg qd as pt euvolemic  - C/w Aldactone 50mg - switched to BID per HF  - RHC/LHC planned for 10/18/24  - Strict I&O, daily standing weight  - Monitor BUN/Cr -History of RV failure iso pHTN, presented with worsening SOUZA, on exam with JVD  - pro-BNP elevated at 1861  - Home regimen: Aldactone 25mg QD, Torsemide 20mg QD  - s/p Torsemide 20mg PO + 1mg Bumex (10/14) in ED.   -  TTE on 10/14 shows enlarged right ventricular cavity size and reduced right ventricular systolic function with evidence of right ventricular failure. EF 70%.   - Standing weight on 10/14: 97.6 kg    Plan:  - Consulted Dr. Chavis (outpatient HF) and EP  - S/p Bumex 1mg IV BID - bumex 1mg PO daily  - Consider transitioning back to home torsemide 20mg  - C/w Aldactone 50mg - switched to 50mg BID per HF  - RHC/LHC planned for 10/18/24  - Strict I&O, daily standing weight  - Monitor BUN/Cr

## 2024-10-17 NOTE — PROGRESS NOTE ADULT - PROBLEM SELECTOR PLAN 7
History of asthma, on Atrovent PRN outpatient, was on Prednisone 10mg QD for pHTN    Plan:  - Pulbritton aguero appreciated  - Will give Atrovent nebulizer q6h, patient refused albuterol stating it makes her heart race  - Will c/w Prednisone 10mg  - Start Singulair 10mg PO  - Start Budesonide 0.5mg inhaled BID - Pt on 2 L O2 at home   - currently 99 spO2 on 2L nasal canula.     Plan:   - continue on O2   - Monitor RR and O2 saturation.  - supplemental O2 as needed for sat >90

## 2024-10-17 NOTE — PROGRESS NOTE ADULT - PROBLEM SELECTOR PLAN 6
- Pt on 2 L O2 at home   - currently 99 spO2 on 2L nasal canula.     Plan:   - continue on O2   - Monitor RR and O2 saturation.  - supplemental O2 as needed for sat >90 WBC 13 on admission, now improving to 11.98. Likely due to chronic steroid use, infectious etiology less likely.  - CT (10/13): patchy GGO unchanged from prior  - afebrile, tachycardic   - UA negative  - No localizing symptoms besides SOB which may be attributable to pHTN; No fever/cough  - Full RVP negative  - BCx NGTD    Plan:  - Will monitor off abx for now, low threshold to start

## 2024-10-17 NOTE — PROGRESS NOTE ADULT - ATTENDING COMMENTS
44F PMH pHTN (group I and IV on Rimodulin and Xarelto, on home 2L O2), c/b R heart failure, history of RA thrombus s/p thrombectomy, history of DVT/PE (2016), multiple abdominal wall abscesses (s/p I&D), asthma, bradycardia s/p dual chamber PPM presenting with SOUZA, palpitations, and epigastric pain.     #pHTN exacerbation  #Chronic hypoxemic respiratory failure  - Off IV bumex and transitioned to PO bumex 1 mg qd  - Plan for R/LHC tomorrow   - Continue Remodulin at increased dose 51 ng and Revatio 20 mg TID  - Continue O2 therapy with NC, baseline 2 L  - Ambulate patient today, up in chair with meals  - HF and pHTN following  #Acute on chronic leukocytosis  #Steroid-induced leukocytosis  - infectious workup negative to date. Monitor off ABX, low threshold to initiate  #Epigastric pain  #hx GERD  - Protonix 40 mg qd, dc carafate    - Reviewing, and interpreting labs and testing.  - Independently obtaining a review of systems and performing a physical exam  - Reviewing consultant documentation/recommendations in addition to discussing plan of care with consultants.  - Counselling and educating patient and family regarding interpretation of aforementioned items and plan of care.  - This excludes time spent teaching residents/medical students    Time Spent: 40 minutes.

## 2024-10-17 NOTE — PROGRESS NOTE ADULT - PROBLEM SELECTOR PLAN 9
Diet: Regular  DVT ppx: Xarelto 20mg QD, switch to heparin gtt for RHC  Disposition: Pending course  Code: Full code    Fluticasone for allergies History of PE. RA clot s/p aspiration 2022    Plan:  -C/w Xarelto; swtich to hep gtt for plan for cath

## 2024-10-17 NOTE — PROGRESS NOTE ADULT - ASSESSMENT
Spoke with patient she stated that she needs to review her personal schedule prior to calling back and scheduling her appointment with Dr. Perez for medication refills.   42F PMH PE on xarelto, pulmonary HTN (likely group I and group IV), ILD, JULIA, obesity presents to the hospital for 2 days of palpitations and abdominal pain/nausea/emesis. Denies any inciting event. Noted to have labile hypotension and labs notable for worsening transaminitis, BNP 9k (much higher than prior) as well as acute kidney injury. Feels improved since initiation of /Marcela. Found to have RA clot on repeat TTE and underwent CTA which did not reveal any PE but went for aspiration and had partial thrombus removal and stopped as appeared more chronic. Changed to heparin gtt.     Improving LFTs and renal function normalized. TTE done which showed hyperdynamic and compressed LV, severe RV dysfunction, mod TR, dilated IVC. Repeat TTE with improved RV function however RA thrombus noted. Overall stage D, NYHA class IV with severe pulmonary HTN.

## 2024-10-17 NOTE — PROGRESS NOTE ADULT - PROBLEM SELECTOR PLAN 4
WBC 13 on admission, now improving to 11.98. Likely due to chronic steroid use, infectious etiology less likely.  - CT (10/13): patchy GGO unchanged from prior  - afebrile, tachycardic   - UA negative  - No localizing symptoms besides SOB which may be attributable to pHTN; No fever/cough  - Full RVP negative  - BCx NGTD    Plan:  - Will monitor off abx for now, low threshold to start - Pt reports migraine headaches starting on 10/14.  - Pt concerned that headache caused by different brand of sildenafil than her home regiment. Pt instructed to bring home medication to hospital.   -s/p Sumatriptan 50mg PO and Esgic with no resolution of symptoms    Plan:  - Dilaudid prn  - Begin use of patient's sildenafil once obtained from home

## 2024-10-17 NOTE — PROGRESS NOTE ADULT - SUBJECTIVE AND OBJECTIVE BOX
PULMONARY      OVERNIGHT EVENTS/INTERVAL HPI: Still with headache.  Believes due to brand of Sildenafil receiving in hospital. Reviewed over phone receiving generic Sildenafil through her pharmacy.  On hep gtt anticipating LHC/RHC tomorrow.      OBJECTIVE:  Vital Signs Last 24 Hrs  T(C): 36.6 (17 Oct 2024 05:10), Max: 36.8 (16 Oct 2024 11:00)  T(F): 97.8 (17 Oct 2024 05:10), Max: 98.3 (16 Oct 2024 11:00)  HR: 105 (17 Oct 2024 05:10) (91 - 109)  BP: 105/65 (17 Oct 2024 05:10) (99/66 - 132/90)  BP(mean): 77 (16 Oct 2024 20:35) (77 - 104)  RR: 18 (17 Oct 2024 05:10) (18 - 18)  SpO2: 100% (17 Oct 2024 05:10) (97% - 100%)    Parameters below as of 17 Oct 2024 05:10  Patient On (Oxygen Delivery Method): nasal cannula  O2 Flow (L/min): 2    I&O's Detail    16 Oct 2024 07:01  -  17 Oct 2024 07:00  --------------------------------------------------------  IN:    freetext medication - Infusion: 0.2 mL    freetext medication - Infusion: 1.1 mL    Oral Fluid: 1250 mL  Total IN: 1251.3 mL    OUT:    Voided (mL): 1200 mL  Total OUT: 1200 mL    Total NET: 51.3 mL        Physical Exam:  GENERAL: Awake, alert and interactive, appears comfortable  NEURO: A&Ox4, no focal deficits, 5/5 strength in all ext, reflexes 2+ throughout, CN 2-12 intact  HEENT: Normocephalic, atraumatic, no conjunctivitis or scleral icterus, oral mucosa moist, no oral lesions noted  NECK: Supple, no LAD, no JVD, thyroid not palpable  CARDIAC: Tachycardic rate, Regular rhythm, +S1/S2, no murmurs/rubs/gallops  PULM: +Increased work of breath, clear to auscultation bilaterally, +Expiratory wheeze bilaterally, No rales/rhonchi  ABDOMEN: Soft, +epigastric tenderness to deep palpation, nondistended, +bs, no hepatosplenomegaly, no rebound tenderness or fluid wave, no CVA tenderness  MSK: Range of motion grossly intact, no back tenderness  SKIN: Warm and dry, no rashes, lesions  VASC: Cap refil < 2 sec, 2+ peripheral pulses, no edema, no LE tenderness  Psych: Appropriate affect      MEDICATIONS  (STANDING):  buDESOnide    Inhalation Suspension 0.5 milliGRAM(s) Inhalation two times a day  influenza   Vaccine 0.5 milliLiter(s) IntraMuscular once  ipratropium    for Nebulization 500 MICROGram(s) Nebulizer every 6 hours  montelukast 10 milliGRAM(s) Oral daily  pantoprazole    Tablet 40 milliGRAM(s) Oral before breakfast  predniSONE   Tablet 10 milliGRAM(s) Oral daily  rivaroxaban 20 milliGRAM(s) Oral with dinner  sildenafil (REVATIO) 20 milliGRAM(s) Oral <User Schedule>  spironolactone 50 milliGRAM(s) Oral daily  sucralfate 1 Gram(s) Oral two times a day  Treprostinil  SubQ continuous infusion 48 NANOGram(s)/kG/Min 0.052 mL/Hr (0.05 mL/Hr) IV Continuous <Continuous>    MEDICATIONS  (PRN):  acetaminophen     Tablet .. 650 milliGRAM(s) Oral every 6 hours PRN Temp greater or equal to 38C (100.4F), Mild Pain (1 - 3)  aluminum hydroxide/magnesium hydroxide/simethicone Suspension 30 milliLiter(s) Oral every 4 hours PRN Dyspepsia        Labs:                                   14.4   11.98 )-----------( 368      ( 16 Oct 2024 09:11 )             46.6     10-16    135  |  95[L]  |  17  ----------------------------<  129[H]  3.3[L]   |  22  |  1.18    Ca    8.4      16 Oct 2024 09:11  Phos  2.6     10-16  Mg     1.6     10-16    TPro  7.2  /  Alb  4.0  /  TBili  0.8  /  DBili  x   /  AST  12  /  ALT  12  /  AlkPhos  61  10-16        Radiology:   < from: CT Angio Chest PE Protocol w/ IV Cont (10.14.24 @ 01:08) >  Mixing artifact in the main pulmonary arteries degrading evaluation. No   evidence of acute pulmonary embolism within the confines of this exam.    Patchy groundglass opacities in both lungs similar to that seen on prior   exam. Nonspecific finding could represent pulmonary edema or may be   infectious/inflammatory.    Dilated pulmonary arteries in keeping with patient's known pulmonary   hypertension.    < end of copied text >    < from: POCUS ED TTE 2D F/U, Limited w/o Cont. (10.14.24 @ 04:28) >  IMPRESSION:  Trace pericardial effusion with no sonographic signs of tamponade.  Severe right ventricular dilation.    < end of copied text >      < from: TTE W or WO Ultrasound Enhancing Agent (10.14.24 @ 17:00) >  CONCLUSIONS:      1. Technically difficult image quality.   2. Left ventricular cavity is small. The interventricular septum is flattened in systole and diastole consistent with right ventricular pressure and volume overload. Left ventricular systolic function is hyperdynamic with an ejection fraction visually estimated at > 70 %. There is poor visualization of the endocardial borders to determine the presence of wall motion abnormalities.   3. The right ventricle is not well visualized. enlarged right ventricular cavity size and reduced right ventricular systolic function.   4. Mild to moderate pulmonic regurgitation.   5. Pulmonary artery systolic pressure could not be estimated.   6. Compared to the transthoracic echocardiogram performed on 7/16/2024, the right ventricle was not as well visualized on this study. There is again echocardiographic evidence of right ventricular failure. Quantification of severity is limited.    < end of copied text >

## 2024-10-17 NOTE — DISCHARGE NOTE PROVIDER - NSDCFUADDAPPT_GEN_ALL_CORE_FT
APPTS ARE READY TO BE MADE: [X] YES    Best Family or Patient Contact (if needed):    Additional Information about above appointments (if needed):    1: Pulmonary   2:   3:     Other comments or requests:

## 2024-10-17 NOTE — DISCHARGE NOTE PROVIDER - CARE PROVIDER_API CALL
Vivian Yoon  Pulmonary Disease  [address] [facility], [facility] [address]  [place] [address]  Phone: [phone]  Fax: [phone]  Follow Up Time: 1 week

## 2024-10-17 NOTE — PROGRESS NOTE ADULT - PROBLEM SELECTOR PLAN 5
- Pt reports migraine headaches starting on 10/14.  - Pt concerned that headache caused by different brand of sildenafil than her home regiment. Pt instructed to bring home medication to hospital.   -s/p Sumatriptan 50mg PO and Esgic with no resolution of symptoms    Plan:  - Dilaudid prn  - Begin use of patient's sildenafil once obtained from home Chronic Bradycardia s/p PPM. Presenting with palpitations.    Plan:  - F/u TSH  - S/p PPM interrogation on 10/14 - showed SVT  - F/u EP for possible change in monitor zone  - Telemetry monitoring

## 2024-10-17 NOTE — PROGRESS NOTE ADULT - PROBLEM SELECTOR PLAN 8
Chronic Bradycardia s/p PPM. Presenting with palpitations.    Plan:  - F/u TSH  - S/p PPM interrogation on 10/14 - showed SVT  - F/u EP for possible change in monitor zone  - Telemetry monitoring History of asthma, on Atrovent PRN outpatient, was on Prednisone 10mg QD for pHTN    Plan:  - Pulbritton aguero appreciated  - Will give Atrovent nebulizer q6h, patient refused albuterol stating it makes her heart race  - Will c/w Prednisone 10mg  - Start Singulair 10mg PO  - Start Budesonide 0.5mg inhaled BID

## 2024-10-17 NOTE — PROGRESS NOTE ADULT - ASSESSMENT
45 yo F w/ PMHx PHTN (group I and IV) currently on remodulin and xarelto, chronic hypoxemic respiratory failure on 2L home O2, RA thrombus s/p thrombectomy, Asthma (Pred 10mg), prior AVRNT s/p ablation, bradycardia s/p dual chamber PPM, and hx of abdominal wall abscesses presenting for increased dyspnea and palpitations with HR into 150s. CTPA negative for PE but showing bilateral GGOs. ED POCUS with IVC >2cm, resp variation noted, severe RV dilation. BNP elevated to 1800, prev 800 on recent admission.  No wheezing on exam.  Overall findings consistent with HF exacerbation. Echo with RV dilation, mildly reduced RV function. EP investigated PPM but no arrhythmias noted?    Recommend:  - Continue Remodulin, increased 10/15/24 to 51ng/kg/min (based on 92kg weight), currently running at 0.056ml/hr  - Continue Sildenafil 20mg TID (may want to bring in home Sildenafil which will need pharmacy approval)  - please continue with Ipratropium neb q6h  - continue Budesonide 0.5mg BID  - Bumex 1mg PO daily  - daily standing weights, strict Is/Os, monitor BUN/Cr  - Aldactone at 50mg BID now  - supplemental O2 as needed for sat >90, currently on 2L NC  - Prednisone 10mg, barring any swelling w/ increased Remodulin dose will try to continue to taper off during admission  - holding Xarelto, on Heparin gtt in anticipation of RHC/LHC  - EP does not plan to change HR sensitivity threshold on PPM d/t too many prior episodes of SVT  - RHC/LHC planned for 10/18/24

## 2024-10-17 NOTE — PROGRESS NOTE ADULT - PROBLEM SELECTOR PLAN 3
- History of GERD, on protonix 40mg at home.   - Epigastric tenderness on palpitation; likely related to chest pain with exertion/palpations  - Liver Enzymes WNL  - lipase 16     Plan:  - C/w protonix   - C/w maalox  - CT abdomen considered

## 2024-10-17 NOTE — PROGRESS NOTE ADULT - PROBLEM SELECTOR PLAN 1
-History of group 1 + 4 pHTN on Sildenafil and Remodulin   -pro-BNP elevated at 1861  -CXR with pulm edema  -TTE on 10/14 shows enlarged right ventricular cavity size and reduced right ventricular systolic function with evidence of right ventricular failure    Plan:  - Pulm recs appreciated  - C/w Sildenafil 20mg TID  - Increase Remodulin from 49ng to 51ng  - S/p Bumex 1mg IV BID - transitioned to home torsemide 20 mg qd as pt euvolemic  - C/w Aldactone 50mg - switched to BID per HF  - RHC/LHC planned for 10/18/24  - Continue O2 therapy with NC, baseline 2 L  - Encourage ambulation as tolerated -History of group 1 + 4 pHTN on Sildenafil and Remodulin   -pro-BNP elevated at 1861  -CXR with pulm edema  -TTE on 10/14 shows enlarged right ventricular cavity size and reduced right ventricular systolic function with evidence of right ventricular failure    Plan:  - Pulm recs appreciated  - C/w Sildenafil 20mg TID  - Increase Remodulin from 49ng to 51ng  - S/p Bumex 1mg IV BID - transitioned to bumex 1mg PO daily as pt euvolemic  - C/w Aldactone 50mg - switched to BID per HF  - RHC/LHC planned for 10/18/24  - Continue O2 therapy with NC, baseline 2 L  - Encourage ambulation as tolerated

## 2024-10-18 LAB
ALBUMIN SERPL ELPH-MCNC: 4.1 G/DL — SIGNIFICANT CHANGE UP (ref 3.3–5)
ALP SERPL-CCNC: 66 U/L — SIGNIFICANT CHANGE UP (ref 40–120)
ALT FLD-CCNC: 17 U/L — SIGNIFICANT CHANGE UP (ref 10–45)
ANION GAP SERPL CALC-SCNC: 14 MMOL/L — SIGNIFICANT CHANGE UP (ref 5–17)
ANISOCYTOSIS BLD QL: SIGNIFICANT CHANGE UP
APTT BLD: 124.4 SEC — CRITICAL HIGH (ref 24.5–35.6)
APTT BLD: 85.1 SEC — HIGH (ref 24.5–35.6)
AST SERPL-CCNC: 43 U/L — HIGH (ref 10–40)
BASOPHILS # BLD AUTO: 0.25 K/UL — HIGH (ref 0–0.2)
BASOPHILS NFR BLD AUTO: 1.7 % — SIGNIFICANT CHANGE UP (ref 0–2)
BILIRUB SERPL-MCNC: 0.8 MG/DL — SIGNIFICANT CHANGE UP (ref 0.2–1.2)
BLD GP AB SCN SERPL QL: NEGATIVE — SIGNIFICANT CHANGE UP
BUN SERPL-MCNC: 19 MG/DL — SIGNIFICANT CHANGE UP (ref 7–23)
CALCIUM SERPL-MCNC: 9 MG/DL — SIGNIFICANT CHANGE UP (ref 8.4–10.5)
CHLORIDE SERPL-SCNC: 97 MMOL/L — SIGNIFICANT CHANGE UP (ref 96–108)
CO2 SERPL-SCNC: 21 MMOL/L — LOW (ref 22–31)
CREAT SERPL-MCNC: 0.95 MG/DL — SIGNIFICANT CHANGE UP (ref 0.5–1.3)
EGFR: 76 ML/MIN/1.73M2 — SIGNIFICANT CHANGE UP
ELLIPTOCYTES BLD QL SMEAR: SLIGHT — SIGNIFICANT CHANGE UP
EOSINOPHIL # BLD AUTO: 0.25 K/UL — SIGNIFICANT CHANGE UP (ref 0–0.5)
EOSINOPHIL NFR BLD AUTO: 1.7 % — SIGNIFICANT CHANGE UP (ref 0–6)
GLUCOSE SERPL-MCNC: 85 MG/DL — SIGNIFICANT CHANGE UP (ref 70–99)
HCT VFR BLD CALC: 50 % — HIGH (ref 34.5–45)
HGB BLD-MCNC: 15.1 G/DL — SIGNIFICANT CHANGE UP (ref 11.5–15.5)
HGB BLDA-MCNC: 14.7 G/DL — SIGNIFICANT CHANGE UP (ref 11.7–16.1)
HGB BLDA-MCNC: 15 G/DL — SIGNIFICANT CHANGE UP (ref 11.7–16.1)
HGB FLD-MCNC: 14.6 G/DL — SIGNIFICANT CHANGE UP (ref 11.7–16.5)
INR BLD: 1.03 RATIO — SIGNIFICANT CHANGE UP (ref 0.85–1.16)
LYMPHOCYTES # BLD AUTO: 29.3 % — SIGNIFICANT CHANGE UP (ref 13–44)
LYMPHOCYTES # BLD AUTO: 4.34 K/UL — HIGH (ref 1–3.3)
MAGNESIUM SERPL-MCNC: 2.1 MG/DL — SIGNIFICANT CHANGE UP (ref 1.6–2.6)
MANUAL SMEAR VERIFICATION: SIGNIFICANT CHANGE UP
MCHC RBC-ENTMCNC: 21 PG — LOW (ref 27–34)
MCHC RBC-ENTMCNC: 30.2 GM/DL — LOW (ref 32–36)
MCV RBC AUTO: 69.6 FL — LOW (ref 80–100)
METAMYELOCYTES # FLD: 0.9 % — HIGH (ref 0–0)
MICROCYTES BLD QL: SIGNIFICANT CHANGE UP
MONOCYTES # BLD AUTO: 1.02 K/UL — HIGH (ref 0–0.9)
MONOCYTES NFR BLD AUTO: 6.9 % — SIGNIFICANT CHANGE UP (ref 2–14)
MYELOCYTES NFR BLD: 0.9 % — HIGH (ref 0–0)
NEUTROPHILS # BLD AUTO: 8.68 K/UL — HIGH (ref 1.8–7.4)
NEUTROPHILS NFR BLD AUTO: 58.6 % — SIGNIFICANT CHANGE UP (ref 43–77)
OXYHGB MFR BLDA: 93.9 % — SIGNIFICANT CHANGE UP (ref 90–95)
OXYHGB MFR BLDA: 96.9 % — HIGH (ref 90–95)
OXYHGB MFR BLDMV: 70.6 % — LOW (ref 90–95)
PHOSPHATE SERPL-MCNC: 4 MG/DL — SIGNIFICANT CHANGE UP (ref 2.5–4.5)
PLAT MORPH BLD: NORMAL — SIGNIFICANT CHANGE UP
PLATELET # BLD AUTO: 398 K/UL — SIGNIFICANT CHANGE UP (ref 150–400)
POIKILOCYTOSIS BLD QL AUTO: SIGNIFICANT CHANGE UP
POTASSIUM SERPL-MCNC: 5.6 MMOL/L — HIGH (ref 3.5–5.3)
POTASSIUM SERPL-SCNC: 5.6 MMOL/L — HIGH (ref 3.5–5.3)
PROT SERPL-MCNC: 8 G/DL — SIGNIFICANT CHANGE UP (ref 6–8.3)
PROTHROM AB SERPL-ACNC: 11.8 SEC — SIGNIFICANT CHANGE UP (ref 9.9–13.4)
RBC # BLD: 7.18 M/UL — HIGH (ref 3.8–5.2)
RBC # FLD: 20.2 % — HIGH (ref 10.3–14.5)
RBC BLD AUTO: ABNORMAL
RH IG SCN BLD-IMP: POSITIVE — SIGNIFICANT CHANGE UP
SAO2 % BLD: 72.6 % — SIGNIFICANT CHANGE UP (ref 60–90)
SAO2 % BLDA: 96.6 % — SIGNIFICANT CHANGE UP (ref 94–98)
SAO2 % BLDA: 99.2 % — HIGH (ref 94–98)
SODIUM SERPL-SCNC: 132 MMOL/L — LOW (ref 135–145)
WBC # BLD: 14.81 K/UL — HIGH (ref 3.8–10.5)
WBC # FLD AUTO: 14.81 K/UL — HIGH (ref 3.8–10.5)

## 2024-10-18 PROCEDURE — 99233 SBSQ HOSP IP/OBS HIGH 50: CPT

## 2024-10-18 PROCEDURE — 99232 SBSQ HOSP IP/OBS MODERATE 35: CPT | Mod: GC

## 2024-10-18 PROCEDURE — 93453 R&L HRT CATH W/VENTRICLGRPHY: CPT | Mod: 26

## 2024-10-18 PROCEDURE — 93463 DRUG ADMIN & HEMODYNMIC MEAS: CPT | Mod: 59

## 2024-10-18 PROCEDURE — 99152 MOD SED SAME PHYS/QHP 5/>YRS: CPT

## 2024-10-18 RX ORDER — HYDROMORPHONE HCL/0.9% NACL/PF 6 MG/30 ML
1 PATIENT CONTROLLED ANALGESIA SYRINGE INTRAVENOUS EVERY 12 HOURS
Refills: 0 | Status: DISCONTINUED | OUTPATIENT
Start: 2024-10-18 | End: 2024-10-18

## 2024-10-18 RX ORDER — RIVAROXABAN 20 MG/1
20 TABLET, FILM COATED ORAL DAILY
Refills: 0 | Status: DISCONTINUED | OUTPATIENT
Start: 2024-10-18 | End: 2024-10-18

## 2024-10-18 RX ORDER — CETIRIZINE HCL 10 MG/1
5 TABLET ORAL DAILY
Refills: 0 | Status: DISCONTINUED | OUTPATIENT
Start: 2024-10-18 | End: 2024-10-19

## 2024-10-18 RX ORDER — RIVAROXABAN 20 MG/1
20 TABLET, FILM COATED ORAL DAILY
Refills: 0 | Status: DISCONTINUED | OUTPATIENT
Start: 2024-10-19 | End: 2024-10-23

## 2024-10-18 RX ORDER — HYDROMORPHONE HCL/0.9% NACL/PF 6 MG/30 ML
0.2 PATIENT CONTROLLED ANALGESIA SYRINGE INTRAVENOUS ONCE
Refills: 0 | Status: DISCONTINUED | OUTPATIENT
Start: 2024-10-18 | End: 2024-10-18

## 2024-10-18 RX ORDER — HYDROMORPHONE HCL/0.9% NACL/PF 6 MG/30 ML
2 PATIENT CONTROLLED ANALGESIA SYRINGE INTRAVENOUS DAILY
Refills: 0 | Status: DISCONTINUED | OUTPATIENT
Start: 2024-10-18 | End: 2024-10-20

## 2024-10-18 RX ORDER — PSEUDOEPHEDRINE HCL 30 MG
30 TABLET ORAL
Refills: 0 | Status: DISCONTINUED | OUTPATIENT
Start: 2024-10-18 | End: 2024-10-19

## 2024-10-18 RX ORDER — DIPHENHYDRAMINE HCL 12.5MG/5ML
25 ELIXIR ORAL ONCE
Refills: 0 | Status: COMPLETED | OUTPATIENT
Start: 2024-10-18 | End: 2024-10-18

## 2024-10-18 RX ADMIN — Medication 20 MILLIGRAM(S): at 15:54

## 2024-10-18 RX ADMIN — IPRATROPIUM BROMIDE 500 MICROGRAM(S): 0.5 SOLUTION RESPIRATORY (INHALATION) at 00:59

## 2024-10-18 RX ADMIN — Medication 10 MILLIGRAM(S): at 13:32

## 2024-10-18 RX ADMIN — PANTOPRAZOLE SODIUM 40 MILLIGRAM(S): 40 TABLET, DELAYED RELEASE ORAL at 05:35

## 2024-10-18 RX ADMIN — Medication 25 MILLIGRAM(S): at 23:39

## 2024-10-18 RX ADMIN — IPRATROPIUM BROMIDE 500 MICROGRAM(S): 0.5 SOLUTION RESPIRATORY (INHALATION) at 05:35

## 2024-10-18 RX ADMIN — HEPARIN SODIUM 1100 UNIT(S)/HR: 10000 INJECTION INTRAVENOUS; SUBCUTANEOUS at 17:23

## 2024-10-18 RX ADMIN — Medication 2 MILLIGRAM(S): at 10:18

## 2024-10-18 RX ADMIN — HEPARIN SODIUM 1300 UNIT(S)/HR: 10000 INJECTION INTRAVENOUS; SUBCUTANEOUS at 08:29

## 2024-10-18 RX ADMIN — IPRATROPIUM BROMIDE 500 MICROGRAM(S): 0.5 SOLUTION RESPIRATORY (INHALATION) at 23:39

## 2024-10-18 RX ADMIN — Medication 0.2 MILLIGRAM(S): at 23:17

## 2024-10-18 RX ADMIN — IPRATROPIUM BROMIDE 500 MICROGRAM(S): 0.5 SOLUTION RESPIRATORY (INHALATION) at 13:33

## 2024-10-18 RX ADMIN — Medication 50 MILLIGRAM(S): at 05:36

## 2024-10-18 RX ADMIN — FLUTICASONE PROPIONATE 1 SPRAY(S): 50 SPRAY, METERED NASAL at 05:43

## 2024-10-18 RX ADMIN — Medication 2 MILLIGRAM(S): at 10:50

## 2024-10-18 RX ADMIN — Medication 20 MILLIGRAM(S): at 06:38

## 2024-10-18 RX ADMIN — Medication 20 MILLIGRAM(S): at 22:47

## 2024-10-18 RX ADMIN — BUDESONIDE 0.5 MILLIGRAM(S): 3 CAPSULE ORAL at 05:35

## 2024-10-18 RX ADMIN — Medication 0.2 MILLIGRAM(S): at 22:47

## 2024-10-18 RX ADMIN — MONTELUKAST SODIUM 10 MILLIGRAM(S): 10 TABLET, FILM COATED ORAL at 13:32

## 2024-10-18 RX ADMIN — Medication 1 MILLIGRAM(S): at 05:36

## 2024-10-18 NOTE — PROGRESS NOTE ADULT - ASSESSMENT
45 yo F w/ PMHx PHTN (group I and IV) currently on remodulin and xarelto, chronic hypoxemic respiratory failure on 2L home O2, RA thrombus s/p thrombectomy, Asthma (Pred 10mg), prior AVRNT s/p ablation, bradycardia s/p dual chamber PPM, and hx of abdominal wall abscesses presenting for increased dyspnea and palpitations with HR into 150s. CTPA negative for PE but showing bilateral GGOs. ED POCUS with IVC >2cm, resp variation noted, severe RV dilation. BNP elevated to 1800, prev 800 on recent admission.  No wheezing on exam.  Overall findings consistent with HF exacerbation. Echo with RV dilation, mildly reduced RV function. EP investigated PPM but no arrhythmias noted?    Recommend:  - Continue Remodulin, increased 10/15/24 to 51ng/kg/min (based on 92kg weight), currently running at 0.056ml/hr  - Continue Sildenafil 20mg TID (may want to bring in home Sildenafil which will need pharmacy approval)  - please continue with Ipratropium neb q6h  - continue Budesonide 0.5mg BID  - Bumex 1mg PO daily  - daily standing weights, strict Is/Os, monitor BUN/Cr  - Aldactone at 50mg BID now  - supplemental O2 as needed for sat >90, currently on 2L NC  - Prednisone 10mg, please DECREASE to 5mg  - holding Xarelto, on Heparin gtt in anticipation of RHC/LHC  - EP does not plan to change HR sensitivity threshold on PPM d/t too many prior episodes of SVT  - Awaiting RHC/LHC RESULTS     43 yo F w/ PMHx PHTN (group I and IV) currently on remodulin and xarelto, chronic hypoxemic respiratory failure on 2L home O2, RA thrombus s/p thrombectomy, Asthma (Pred 10mg), prior AVRNT s/p ablation, bradycardia s/p dual chamber PPM, and hx of abdominal wall abscesses presenting for increased dyspnea and palpitations with HR into 150s. CTPA negative for PE but showing bilateral GGOs. ED POCUS with IVC >2cm, resp variation noted, severe RV dilation. BNP elevated to 1800, prev 800 on recent admission.  No wheezing on exam.  Overall findings consistent with HF exacerbation. Echo with RV dilation, mildly reduced RV function. EP investigated PPM but no arrhythmias noted?    Recommend:  - Continue Remodulin, increased 10/15/24 to 51ng/kg/min (based on 92kg weight), currently running at 0.056ml/hr  - Continue Sildenafil 20mg TID (brought in home Sildenafil which will need pharmacy approval)  - please continue with Ipratropium neb q6h  - continue Budesonide 0.5mg BID  - Bumex 1mg PO daily  - daily standing weights, strict Is/Os, monitor BUN/Cr  - Aldactone at 50mg BID now  - supplemental O2 as needed for sat >90, currently on 2L NC  - Prednisone 10mg, please DECREASE to 5mg  - holding Xarelto, on Heparin gtt in anticipation of RHC/LHC today  - EP does not plan to change HR sensitivity threshold on PPM d/t too many prior episodes of SVT  - Awaiting RHC/LHC RESULTS, will follow-up as I am on call this weekend

## 2024-10-18 NOTE — PROGRESS NOTE ADULT - SUBJECTIVE AND OBJECTIVE BOX
Internal Medicine Progress Note    Pt is a 44 year old woman with a history of pHTN (group I and IV on Rimodulin and Xarelto, on home 2L O2), c/b R heart failure, history of RA thrombus s/p thrombectomy, history of DVT/PE (2016), multiple abdominal wall abscesses (s/p I&D), asthma, bradycardia s/p dual chamber PPM. She presents with worsening palpitations and SOB on exertion.      OVERNIGHT EVENTS/INTERVAL HPI: NAEON. Pt examined at bedside this morning. She reports walking in the hallway yesterday and sitting up in chair. States she felt tachycardic/palpitations while walking, but did not experience dyspnea or chest pain. Endorses 8/10 throbbing headache this morning, improving with dilaudid. She has been unable to receive her home sildenafil, but states her  should be brining it today. Has not had any BM yet as she is unable to ambulate to bathroom. Reports urinating frequently 2/2 diuresis. Denies abdominal pain.      OBJECTIVE:  Vital Signs Last 24 Hrs  T(C): 36.6 (17 Oct 2024 05:10), Max: 36.8 (16 Oct 2024 11:00)  T(F): 97.8 (17 Oct 2024 05:10), Max: 98.3 (16 Oct 2024 11:00)  HR: 105 (17 Oct 2024 05:10) (91 - 109)  BP: 105/65 (17 Oct 2024 05:10) (99/66 - 132/90)  BP(mean): 77 (16 Oct 2024 20:35) (77 - 104)  RR: 18 (17 Oct 2024 05:10) (18 - 18)  SpO2: 100% (17 Oct 2024 05:10) (97% - 100%)    Parameters below as of 17 Oct 2024 05:10  Patient On (Oxygen Delivery Method): nasal cannula  O2 Flow (L/min): 2    I&O's Detail    16 Oct 2024 07:01  -  17 Oct 2024 07:00  --------------------------------------------------------  IN:    freetext medication - Infusion: 0.2 mL    freetext medication - Infusion: 1.1 mL    Oral Fluid: 1250 mL  Total IN: 1251.3 mL    OUT:    Voided (mL): 1200 mL  Total OUT: 1200 mL    Total NET: 51.3 mL        Physical Exam:  GENERAL: Awake, alert and interactive, appears comfortable  NEURO: A&Ox4, no focal deficits, 5/5 strength in all ext, reflexes 2+ throughout, CN 2-12 intact  HEENT: Normocephalic, atraumatic, no conjunctivitis or scleral icterus, oral mucosa moist, no oral lesions noted  NECK: Supple, no LAD, no JVD, thyroid not palpable  CARDIAC: Tachycardic rate, Regular rhythm, +S1/S2, no murmurs/rubs/gallops  PULM: +Increased work of breath, clear to auscultation bilaterally, +Expiratory wheeze bilaterally, No rales/rhonchi  ABDOMEN: Soft, +epigastric tenderness to deep palpation, nondistended, +bs, no hepatosplenomegaly, no rebound tenderness or fluid wave, no CVA tenderness  MSK: Range of motion grossly intact, no back tenderness  SKIN: Warm and dry, no rashes, lesions  VASC: Cap refil < 2 sec, 2+ peripheral pulses, no edema, no LE tenderness  Psych: Appropriate affect      MEDICATIONS  (STANDING):  buDESOnide    Inhalation Suspension 0.5 milliGRAM(s) Inhalation two times a day  influenza   Vaccine 0.5 milliLiter(s) IntraMuscular once  ipratropium    for Nebulization 500 MICROGram(s) Nebulizer every 6 hours  montelukast 10 milliGRAM(s) Oral daily  pantoprazole    Tablet 40 milliGRAM(s) Oral before breakfast  predniSONE   Tablet 10 milliGRAM(s) Oral daily  rivaroxaban 20 milliGRAM(s) Oral with dinner  sildenafil (REVATIO) 20 milliGRAM(s) Oral <User Schedule>  spironolactone 50 milliGRAM(s) Oral daily  sucralfate 1 Gram(s) Oral two times a day  Treprostinil  SubQ continuous infusion 48 NANOGram(s)/kG/Min 0.052 mL/Hr (0.05 mL/Hr) IV Continuous <Continuous>    MEDICATIONS  (PRN):  acetaminophen     Tablet .. 650 milliGRAM(s) Oral every 6 hours PRN Temp greater or equal to 38C (100.4F), Mild Pain (1 - 3)  aluminum hydroxide/magnesium hydroxide/simethicone Suspension 30 milliLiter(s) Oral every 4 hours PRN Dyspepsia        Labs:                                   14.4   11.98 )-----------( 368      ( 16 Oct 2024 09:11 )             46.6     10-16    135  |  95[L]  |  17  ----------------------------<  129[H]  3.3[L]   |  22  |  1.18    Ca    8.4      16 Oct 2024 09:11  Phos  2.6     10-16  Mg     1.6     10-16    TPro  7.2  /  Alb  4.0  /  TBili  0.8  /  DBili  x   /  AST  12  /  ALT  12  /  AlkPhos  61  10-16        Radiology:   < from: CT Angio Chest PE Protocol w/ IV Cont (10.14.24 @ 01:08) >  Mixing artifact in the main pulmonary arteries degrading evaluation. No   evidence of acute pulmonary embolism within the confines of this exam.    Patchy groundglass opacities in both lungs similar to that seen on prior   exam. Nonspecific finding could represent pulmonary edema or may be   infectious/inflammatory.    Dilated pulmonary arteries in keeping with patient's known pulmonary   hypertension.    < end of copied text >    < from: POCUS ED TTE 2D F/U, Limited w/o Cont. (10.14.24 @ 04:28) >  IMPRESSION:  Trace pericardial effusion with no sonographic signs of tamponade.  Severe right ventricular dilation.    < end of copied text >      < from: TTE W or WO Ultrasound Enhancing Agent (10.14.24 @ 17:00) >  CONCLUSIONS:      1. Technically difficult image quality.   2. Left ventricular cavity is small. The interventricular septum is flattened in systole and diastole consistent with right ventricular pressure and volume overload. Left ventricular systolic function is hyperdynamic with an ejection fraction visually estimated at > 70 %. There is poor visualization of the endocardial borders to determine the presence of wall motion abnormalities.   3. The right ventricle is not well visualized. enlarged right ventricular cavity size and reduced right ventricular systolic function.   4. Mild to moderate pulmonic regurgitation.   5. Pulmonary artery systolic pressure could not be estimated.   6. Compared to the transthoracic echocardiogram performed on 7/16/2024, the right ventricle was not as well visualized on this study. There is again echocardiographic evidence of right ventricular failure. Quantification of severity is limited.    < end of copied text >     Internal Medicine Progress Note    Pt is a 44 year old woman with a history of pHTN (group I and IV on Rimodulin and Xarelto, on home 2L O2), c/b R heart failure, history of RA thrombus s/p thrombectomy, history of DVT/PE (2016), multiple abdominal wall abscesses (s/p I&D), asthma, bradycardia s/p dual chamber PPM. She presents with worsening palpitations and SOB on exertion.      OVERNIGHT EVENTS/INTERVAL HPI: NAEON. Pt examined at bedside this morning. She reports walking to the bathroom yesterday and sitting up in chair. States she felt tachycardic/palpitations while walking, but did not experience dyspnea, chest pain, or epigastric pain. Endorses 8/10 throbbing headache this morning. Pt asking for Dilaudid. She report that her  did not bring the home dose od sildenafil, but should be bringing it today.  Reports urinating frequently more today 2/2 diuresis. Denies abdominal pain. Overall, she reports feeling better other than her headaches.       OBJECTIVE:  ICU Vital Signs Last 24 Hrs  T(C): 36.7 (18 Oct 2024 05:00), Max: 36.7 (18 Oct 2024 05:00)  T(F): 98 (18 Oct 2024 05:00), Max: 98 (18 Oct 2024 05:00)  HR: 105 (18 Oct 2024 05:00) (81 - 113)  BP: 103/67 (18 Oct 2024 05:00) (103/67 - 119/81)  BP(mean): 94 (17 Oct 2024 17:09) (94 - 95)  ABP: --  ABP(mean): --  RR: 18 (18 Oct 2024 05:00) (18 - 19)  SpO2: 95% (18 Oct 2024 05:00) (92% - 97%)    O2 Parameters below as of 18 Oct 2024 05:00  Patient On (Oxygen Delivery Method): nasal cannula  O2 Flow (L/min): 2      I&O's Detail    17 Oct 2024 07:01  -  18 Oct 2024 07:00  --------------------------------------------------------  IN:    freetext medication - Infusion: 1.2 mL    Heparin Infusion: 291 mL    Oral Fluid: 480 mL  Total IN: 772.2 mL    OUT:    Voided (mL): 1500 mL  Total OUT: 1500 mL    Total NET: -727.8 mL      18 Oct 2024 07:01  -  18 Oct 2024 08:58  --------------------------------------------------------  IN:    freetext medication - Infusion: 0.1 mL    Heparin Infusion: 26 mL  Total IN: 26.1 mL    OUT:    Oral Fluid: 0 mL    Voided (mL): 900 mL  Total OUT: 900 mL    Total NET: -873.9 mL            Physical Exam:  GENERAL: Awake, alert and interactive, appears comfortable  NEURO: A&Ox4, no focal deficits, 5/5 strength in all ext, reflexes 2+ throughout, CN 2-12 intact  HEENT: Normocephalic, atraumatic, no conjunctivitis or scleral icterus, oral mucosa moist, no oral lesions noted  NECK: Supple, no LAD, no JVD, thyroid not palpable  CARDIAC: Tachycardic rate, Regular rhythm, +S1/S2, no murmurs/rubs/gallops  PULM: +Increased work of breath, clear to auscultation bilaterally, +Expiratory wheeze bilaterally, No rales/rhonchi  ABDOMEN: Soft, +epigastric tenderness to deep palpation, nondistended, +bs, no hepatosplenomegaly, no rebound tenderness or fluid wave, no CVA tenderness  MSK: Range of motion grossly intact, no back tenderness  SKIN: Warm and dry, no rashes, lesions  VASC: Cap refil < 2 sec, 2+ peripheral pulses, no edema, no LE tenderness  Psych: Appropriate affect      MEDICATIONS  (STANDING):  buDESOnide    Inhalation Suspension 0.5 milliGRAM(s) Inhalation two times a day  influenza   Vaccine 0.5 milliLiter(s) IntraMuscular once  ipratropium    for Nebulization 500 MICROGram(s) Nebulizer every 6 hours  montelukast 10 milliGRAM(s) Oral daily  pantoprazole    Tablet 40 milliGRAM(s) Oral before breakfast  predniSONE   Tablet 10 milliGRAM(s) Oral daily  rivaroxaban 20 milliGRAM(s) Oral with dinner  sildenafil (REVATIO) 20 milliGRAM(s) Oral <User Schedule>  spironolactone 50 milliGRAM(s) Oral daily  sucralfate 1 Gram(s) Oral two times a day  Treprostinil  SubQ continuous infusion 48 NANOGram(s)/kG/Min 0.052 mL/Hr (0.05 mL/Hr) IV Continuous <Continuous>    MEDICATIONS  (PRN):  acetaminophen     Tablet .. 650 milliGRAM(s) Oral every 6 hours PRN Temp greater or equal to 38C (100.4F), Mild Pain (1 - 3)  aluminum hydroxide/magnesium hydroxide/simethicone Suspension 30 milliLiter(s) Oral every 4 hours PRN Dyspepsia        Labs:                                              15.1   14.81 )-----------( 398      ( 18 Oct 2024 06:24 )             50.0     10-18    132[L]  |  97  |  19  ----------------------------<  85  5.6[H]   |  21[L]  |  0.95    Ca    9.0      18 Oct 2024 06:24  Phos  4.0     10-18  Mg     2.1     10-18    TPro  8.0  /  Alb  4.1  /  TBili  0.8  /  DBili  x   /  AST  43[H]  /  ALT  17  /  AlkPhos  66  10-18        Radiology:   < from: CT Angio Chest PE Protocol w/ IV Cont (10.14.24 @ 01:08) >  Mixing artifact in the main pulmonary arteries degrading evaluation. No   evidence of acute pulmonary embolism within the confines of this exam.    Patchy groundglass opacities in both lungs similar to that seen on prior   exam. Nonspecific finding could represent pulmonary edema or may be   infectious/inflammatory.    Dilated pulmonary arteries in keeping with patient's known pulmonary   hypertension.    < end of copied text >    < from: POCUS ED TTE 2D F/U, Limited w/o Cont. (10.14.24 @ 04:28) >  IMPRESSION:  Trace pericardial effusion with no sonographic signs of tamponade.  Severe right ventricular dilation.    < end of copied text >      < from: TTE W or WO Ultrasound Enhancing Agent (10.14.24 @ 17:00) >  CONCLUSIONS:      1. Technically difficult image quality.   2. Left ventricular cavity is small. The interventricular septum is flattened in systole and diastole consistent with right ventricular pressure and volume overload. Left ventricular systolic function is hyperdynamic with an ejection fraction visually estimated at > 70 %. There is poor visualization of the endocardial borders to determine the presence of wall motion abnormalities.   3. The right ventricle is not well visualized. enlarged right ventricular cavity size and reduced right ventricular systolic function.   4. Mild to moderate pulmonic regurgitation.   5. Pulmonary artery systolic pressure could not be estimated.   6. Compared to the transthoracic echocardiogram performed on 7/16/2024, the right ventricle was not as well visualized on this study. There is again echocardiographic evidence of right ventricular failure. Quantification of severity is limited.    < end of copied text >     Internal Medicine Progress Note    Pt is a 44 year old woman with a history of pHTN (group I and IV on Rimodulin and Xarelto, on home 2L O2), c/b R heart failure, history of RA thrombus s/p thrombectomy, history of DVT/PE (2016), multiple abdominal wall abscesses (s/p I&D), asthma, bradycardia s/p dual chamber PPM. She presents with worsening palpitations and SOB on exertion.      OVERNIGHT EVENTS/INTERVAL HPI: NAEON. Pt examined at bedside this morning. She reports walking to the bathroom yesterday and sitting up in chair. States she felt tachycardic/palpitations while walking, but did not experience dyspnea, chest pain, or epigastric pain. Endorses 8/10 throbbing headache this morning. Pt asking for Dilaudid. She report that her  did not bring the home dose of sildenafil, but should be bringing it today.  Reports urinating frequently more today 2/2 diuresis. She endorses sinus congestion. Denies abdominal pain. Overall, she reports feeling better other than her headaches.       OBJECTIVE:  ICU Vital Signs Last 24 Hrs  T(C): 36.7 (18 Oct 2024 05:00), Max: 36.7 (18 Oct 2024 05:00)  T(F): 98 (18 Oct 2024 05:00), Max: 98 (18 Oct 2024 05:00)  HR: 105 (18 Oct 2024 05:00) (81 - 113)  BP: 103/67 (18 Oct 2024 05:00) (103/67 - 119/81)  BP(mean): 94 (17 Oct 2024 17:09) (94 - 95)  ABP: --  ABP(mean): --  RR: 18 (18 Oct 2024 05:00) (18 - 19)  SpO2: 95% (18 Oct 2024 05:00) (92% - 97%)    O2 Parameters below as of 18 Oct 2024 05:00  Patient On (Oxygen Delivery Method): nasal cannula  O2 Flow (L/min): 2      I&O's Detail    17 Oct 2024 07:01  -  18 Oct 2024 07:00  --------------------------------------------------------  IN:    freetext medication - Infusion: 1.2 mL    Heparin Infusion: 291 mL    Oral Fluid: 480 mL  Total IN: 772.2 mL    OUT:    Voided (mL): 1500 mL  Total OUT: 1500 mL    Total NET: -727.8 mL      18 Oct 2024 07:01  -  18 Oct 2024 08:58  --------------------------------------------------------  IN:    freetext medication - Infusion: 0.1 mL    Heparin Infusion: 26 mL  Total IN: 26.1 mL    OUT:    Oral Fluid: 0 mL    Voided (mL): 900 mL  Total OUT: 900 mL    Total NET: -873.9 mL          Physical Exam:  GENERAL: Awake, alert and interactive, appears comfortable  NEURO: A&Ox4, no focal deficits, 5/5 strength in all ext, reflexes 2+ throughout, CN 2-12 intact  HEENT: Normocephalic, atraumatic, no conjunctivitis or scleral icterus, oral mucosa moist, no oral lesions noted  NECK: Supple, no LAD, no JVD, thyroid not palpable  CARDIAC: Tachycardic rate, Regular rhythm, +S1/S2, no murmurs/rubs/gallops  PULM: +Increased work of breath, clear to auscultation bilaterally, +Expiratory wheeze bilaterally, No rales/rhonchi  ABDOMEN: Soft, +epigastric tenderness to deep palpation, nondistended, +bs, no hepatosplenomegaly, no rebound tenderness or fluid wave, no CVA tenderness  MSK: Range of motion grossly intact, no back tenderness  SKIN: Warm and dry, no rashes, lesions  VASC: Peripheral pulses, no edema, no LE tenderness  Psych: Appropriate affect      MEDICATIONS  (STANDING):  buDESOnide    Inhalation Suspension 0.5 milliGRAM(s) Inhalation two times a day  influenza   Vaccine 0.5 milliLiter(s) IntraMuscular once  ipratropium    for Nebulization 500 MICROGram(s) Nebulizer every 6 hours  montelukast 10 milliGRAM(s) Oral daily  pantoprazole    Tablet 40 milliGRAM(s) Oral before breakfast  predniSONE   Tablet 10 milliGRAM(s) Oral daily  rivaroxaban 20 milliGRAM(s) Oral with dinner  sildenafil (REVATIO) 20 milliGRAM(s) Oral <User Schedule>  spironolactone 50 milliGRAM(s) Oral daily  sucralfate 1 Gram(s) Oral two times a day  Treprostinil  SubQ continuous infusion 48 NANOGram(s)/kG/Min 0.052 mL/Hr (0.05 mL/Hr) IV Continuous <Continuous>    MEDICATIONS  (PRN):  acetaminophen     Tablet .. 650 milliGRAM(s) Oral every 6 hours PRN Temp greater or equal to 38C (100.4F), Mild Pain (1 - 3)  aluminum hydroxide/magnesium hydroxide/simethicone Suspension 30 milliLiter(s) Oral every 4 hours PRN Dyspepsia        Labs:                                              15.1   14.81 )-----------( 398      ( 18 Oct 2024 06:24 )             50.0     10-18    132[L]  |  97  |  19  ----------------------------<  85  5.6[H]   |  21[L]  |  0.95    Ca    9.0      18 Oct 2024 06:24  Phos  4.0     10-18  Mg     2.1     10-18    TPro  8.0  /  Alb  4.1  /  TBili  0.8  /  DBili  x   /  AST  43[H]  /  ALT  17  /  AlkPhos  66  10-18        Radiology:   < from: CT Angio Chest PE Protocol w/ IV Cont (10.14.24 @ 01:08) >  Mixing artifact in the main pulmonary arteries degrading evaluation. No   evidence of acute pulmonary embolism within the confines of this exam.    Patchy groundglass opacities in both lungs similar to that seen on prior   exam. Nonspecific finding could represent pulmonary edema or may be   infectious/inflammatory.    Dilated pulmonary arteries in keeping with patient's known pulmonary   hypertension.    < end of copied text >    < from: POCUS ED TTE 2D F/U, Limited w/o Cont. (10.14.24 @ 04:28) >  IMPRESSION:  Trace pericardial effusion with no sonographic signs of tamponade.  Severe right ventricular dilation.    < end of copied text >      < from: TTE W or WO Ultrasound Enhancing Agent (10.14.24 @ 17:00) >  CONCLUSIONS:      1. Technically difficult image quality.   2. Left ventricular cavity is small. The interventricular septum is flattened in systole and diastole consistent with right ventricular pressure and volume overload. Left ventricular systolic function is hyperdynamic with an ejection fraction visually estimated at > 70 %. There is poor visualization of the endocardial borders to determine the presence of wall motion abnormalities.   3. The right ventricle is not well visualized. enlarged right ventricular cavity size and reduced right ventricular systolic function.   4. Mild to moderate pulmonic regurgitation.   5. Pulmonary artery systolic pressure could not be estimated.   6. Compared to the transthoracic echocardiogram performed on 7/16/2024, the right ventricle was not as well visualized on this study. There is again echocardiographic evidence of right ventricular failure. Quantification of severity is limited.    < end of copied text >

## 2024-10-18 NOTE — PROGRESS NOTE ADULT - ASSESSMENT
44 year old woman with a history of pHTN (group I and IV on Rimodulin and Xarelto, on home 2L O2), c/b R heart failure, history of RA thrombus s/p thrombectomy, history of DVT/PE (2016), multiple abdominal wall abscesses (s/p I&D), asthma, bradycardia s/p dual chamber PPM. She presents with worsening palpitations and SOB on exertion. Labs show elevated pro-BNP at 1861, which increased from 840 in July 2024. Pt chest ct unchanged from July 2024. Concerning for pHTN exacerbation complicated by right heart failure.  44 year old woman with a history of pHTN (group I and IV on Rimodulin and Xarelto, on home 2L O2), c/b R heart failure, history of RA thrombus s/p thrombectomy, history of DVT/PE (2016), multiple abdominal wall abscesses (s/p I&D), asthma, bradycardia s/p dual chamber PPM. She presents with worsening palpitations and SOB on exertion. Labs show elevated pro-BNP at 1861, which increased from 840 in July 2024. Pt chest ct unchanged from July 2024. Concerning for pHTN exacerbation complicated by right heart failure. RHC and LHC scheduled for 10/18.

## 2024-10-18 NOTE — PROGRESS NOTE ADULT - PROBLEM SELECTOR PLAN 7
- Pt on 2 L O2 at home   - currently 99 spO2 on 2L nasal canula.     Plan:   - continue on O2   - Monitor RR and O2 saturation.  - supplemental O2 as needed for sat >90

## 2024-10-18 NOTE — PROGRESS NOTE ADULT - PROBLEM SELECTOR PLAN 9
History of PE. RA clot s/p aspiration 2022    Plan:  -C/w Xarelto; swtich to hep gtt for plan for cath History of PE. RA clot s/p aspiration 2022    Plan:  -C/w Xarelto; switch to hep gtt for plan for cath

## 2024-10-18 NOTE — PROGRESS NOTE ADULT - PROBLEM SELECTOR PLAN 4
- Pt reports migraine headaches starting on 10/14.  - Pt concerned that headache caused by different brand of sildenafil than her home regiment. Pt instructed to bring home medication to hospital.   -s/p Sumatriptan 50mg PO and Esgic with no resolution of symptoms    Plan:  - Dilaudid prn  - Begin use of patient's sildenafil once obtained from home

## 2024-10-18 NOTE — PROGRESS NOTE ADULT - ATTENDING COMMENTS
44F PMH pHTN (group I and IV on Rimodulin and Xarelto, on home 2L O2), c/b R heart failure, history of RA thrombus s/p thrombectomy, history of DVT/PE (2016), multiple abdominal wall abscesses (s/p I&D), asthma, bradycardia s/p dual chamber PPM presenting with SOUZA, palpitations, and epigastric pain.     #pHTN exacerbation  #Chronic hypoxemic respiratory failure  - Off IV bumex and transitioned to PO bumex 1 mg qd.  - Plan for R/LHC today, f/u result/recommendations.   - Continue Remodulin at increased dose 51 ng and Revatio 20 mg TID  - Continue O2 therapy with NC, baseline 2 L  - HF and pHTN following  #Acute on chronic leukocytosis  #Steroid-induced leukocytosis  - infectious workup negative to date. Monitor off ABX, low threshold to initiate  #Epigastric pain  #hx GERD  - Protonix 40 mg qd, dc carafate    Dispo: pending RHC/LHC results and specialist recommendations.     - Reviewing, and interpreting labs and testing.  - Independently obtaining a review of systems and performing a physical exam  - Reviewing consultant documentation/recommendations in addition to discussing plan of care with consultants.  - Counselling and educating patient and family regarding interpretation of aforementioned items and plan of care.  - This excludes time spent teaching residents/medical students    Time Spent: 36 minutes.

## 2024-10-18 NOTE — PROGRESS NOTE ADULT - PROBLEM SELECTOR PLAN 6
WBC 13 on admission, now improving to 11.98. Likely due to chronic steroid use, infectious etiology less likely.  - CT (10/13): patchy GGO unchanged from prior  - afebrile, tachycardic   - UA negative  - No localizing symptoms besides SOB which may be attributable to pHTN; No fever/cough  - Full RVP negative  - BCx NGTD    Plan:  - Will monitor off abx for now, low threshold to start WBC 13 on admission, now increasing to 14.8. Likely due to chronic steroid use, infectious etiology less likely.  - CT (10/13): patchy GGO unchanged from prior  - afebrile, tachycardic   - UA negative  - No localizing symptoms besides SOB which may be attributable to pHTN; No fever/cough  - Full RVP negative  - BCx NGTD    Plan:  - Will monitor off abx for now, low threshold to start

## 2024-10-18 NOTE — PROGRESS NOTE ADULT - PROBLEM SELECTOR PLAN 10
Diet: Regular  DVT ppx: Xarelto 20mg QD, switch to heparin gtt for RHC  Disposition: Pending course  Code: Full code    Fluticasone for allergies Diet: Regular  DVT ppx: Xarelto 20mg QD, switch to heparin gtt for RHC  Disposition: Pending course  Code: Full code    Fluticasone and antihistamines for allergies

## 2024-10-18 NOTE — PROGRESS NOTE ADULT - PROBLEM SELECTOR PLAN 2
-History of RV failure iso pHTN, presented with worsening SOUZA, on exam with JVD  - pro-BNP elevated at 1861  - Home regimen: Aldactone 25mg QD, Torsemide 20mg QD  - s/p Torsemide 20mg PO + 1mg Bumex (10/14) in ED.   -  TTE on 10/14 shows enlarged right ventricular cavity size and reduced right ventricular systolic function with evidence of right ventricular failure. EF 70%.   - Standing weight on 10/14: 97.6 kg    Plan:  - Consulted Dr. Chavis (outpatient HF) and EP  - S/p Bumex 1mg IV BID - bumex 1mg PO daily  - Consider transitioning back to home torsemide 20mg  - C/w Aldactone 50mg - switched to 50mg BID per HF  - RHC/LHC planned for 10/18/24  - Strict I&O, daily standing weight  - Monitor BUN/Cr

## 2024-10-18 NOTE — PROGRESS NOTE ADULT - SUBJECTIVE AND OBJECTIVE BOX
Internal Medicine Progress Note    Pt is a 44 year old woman with a history of pHTN (group I and IV on Remodulin and Xarelto, on home 2L O2), c/b R heart failure, history of RA thrombus s/p thrombectomy, history of DVT/PE (2016), multiple abdominal wall abscesses (s/p I&D), asthma, bradycardia s/p dual chamber PPM. She presents with worsening palpitations and SOB on exertion.      OVERNIGHT EVENTS/INTERVAL HPI:       OBJECTIVE:  ICU Vital Signs Last 24 Hrs  T(C): 36.7 (18 Oct 2024 05:00), Max: 36.7 (18 Oct 2024 05:00)  T(F): 98 (18 Oct 2024 05:00), Max: 98 (18 Oct 2024 05:00)  HR: 105 (18 Oct 2024 05:00) (81 - 113)  BP: 103/67 (18 Oct 2024 05:00) (103/67 - 119/81)  BP(mean): 94 (17 Oct 2024 17:09) (94 - 95)  ABP: --  ABP(mean): --  RR: 18 (18 Oct 2024 05:00) (18 - 19)  SpO2: 95% (18 Oct 2024 05:00) (92% - 97%)    O2 Parameters below as of 18 Oct 2024 05:00  Patient On (Oxygen Delivery Method): nasal cannula  O2 Flow (L/min): 2      I&O's Detail    17 Oct 2024 07:01  -  18 Oct 2024 07:00  --------------------------------------------------------  IN:    freetext medication - Infusion: 1.2 mL    Heparin Infusion: 291 mL    Oral Fluid: 480 mL  Total IN: 772.2 mL    OUT:    Voided (mL): 1500 mL  Total OUT: 1500 mL    Total NET: -727.8 mL      18 Oct 2024 07:01  -  18 Oct 2024 08:58  --------------------------------------------------------  IN:    freetext medication - Infusion: 0.1 mL    Heparin Infusion: 26 mL  Total IN: 26.1 mL    OUT:    Oral Fluid: 0 mL    Voided (mL): 900 mL  Total OUT: 900 mL    Total NET: -873.9 mL            Physical Exam:  GENERAL: Awake, alert and interactive, appears comfortable  NEURO: A&Ox4, no focal deficits, 5/5 strength in all ext, reflexes 2+ throughout, CN 2-12 intact  HEENT: Normocephalic, atraumatic, no conjunctivitis or scleral icterus, oral mucosa moist, no oral lesions noted  NECK: Supple, no LAD, no JVD, thyroid not palpable  CARDIAC: Tachycardic rate, Regular rhythm, +S1/S2, no murmurs/rubs/gallops  PULM: +Increased work of breath, clear to auscultation bilaterally, +Expiratory wheeze bilaterally, No rales/rhonchi  ABDOMEN: Soft, +epigastric tenderness to deep palpation, nondistended, +bs, no hepatosplenomegaly, no rebound tenderness or fluid wave, no CVA tenderness  MSK: Range of motion grossly intact, no back tenderness  SKIN: Warm and dry, no rashes, lesions  VASC: Cap refil < 2 sec, 2+ peripheral pulses, no edema, no LE tenderness  Psych: Appropriate affect      MEDICATIONS  (STANDING):  buDESOnide    Inhalation Suspension 0.5 milliGRAM(s) Inhalation two times a day  influenza   Vaccine 0.5 milliLiter(s) IntraMuscular once  ipratropium    for Nebulization 500 MICROGram(s) Nebulizer every 6 hours  montelukast 10 milliGRAM(s) Oral daily  pantoprazole    Tablet 40 milliGRAM(s) Oral before breakfast  predniSONE   Tablet 10 milliGRAM(s) Oral daily  rivaroxaban 20 milliGRAM(s) Oral with dinner  sildenafil (REVATIO) 20 milliGRAM(s) Oral <User Schedule>  spironolactone 50 milliGRAM(s) Oral daily  sucralfate 1 Gram(s) Oral two times a day  Treprostinil  SubQ continuous infusion 48 NANOGram(s)/kG/Min 0.052 mL/Hr (0.05 mL/Hr) IV Continuous <Continuous>    MEDICATIONS  (PRN):  acetaminophen     Tablet .. 650 milliGRAM(s) Oral every 6 hours PRN Temp greater or equal to 38C (100.4F), Mild Pain (1 - 3)  aluminum hydroxide/magnesium hydroxide/simethicone Suspension 30 milliLiter(s) Oral every 4 hours PRN Dyspepsia        Labs:                                              15.1   14.81 )-----------( 398      ( 18 Oct 2024 06:24 )             50.0     10-18    132[L]  |  97  |  19  ----------------------------<  85  5.6[H]   |  21[L]  |  0.95    Ca    9.0      18 Oct 2024 06:24  Phos  4.0     10-18  Mg     2.1     10-18    TPro  8.0  /  Alb  4.1  /  TBili  0.8  /  DBili  x   /  AST  43[H]  /  ALT  17  /  AlkPhos  66  10-18        Radiology:   < from: CT Angio Chest PE Protocol w/ IV Cont (10.14.24 @ 01:08) >  Mixing artifact in the main pulmonary arteries degrading evaluation. No   evidence of acute pulmonary embolism within the confines of this exam.    Patchy groundglass opacities in both lungs similar to that seen on prior   exam. Nonspecific finding could represent pulmonary edema or may be   infectious/inflammatory.    Dilated pulmonary arteries in keeping with patient's known pulmonary   hypertension.    < end of copied text >    < from: POCUS ED TTE 2D F/U, Limited w/o Cont. (10.14.24 @ 04:28) >  IMPRESSION:  Trace pericardial effusion with no sonographic signs of tamponade.  Severe right ventricular dilation.    < end of copied text >      < from: TTE W or WO Ultrasound Enhancing Agent (10.14.24 @ 17:00) >  CONCLUSIONS:      1. Technically difficult image quality.   2. Left ventricular cavity is small. The interventricular septum is flattened in systole and diastole consistent with right ventricular pressure and volume overload. Left ventricular systolic function is hyperdynamic with an ejection fraction visually estimated at > 70 %. There is poor visualization of the endocardial borders to determine the presence of wall motion abnormalities.   3. The right ventricle is not well visualized. enlarged right ventricular cavity size and reduced right ventricular systolic function.   4. Mild to moderate pulmonic regurgitation.   5. Pulmonary artery systolic pressure could not be estimated.   6. Compared to the transthoracic echocardiogram performed on 7/16/2024, the right ventricle was not as well visualized on this study. There is again echocardiographic evidence of right ventricular failure. Quantification of severity is limited.    < end of copied text >     Internal Medicine Progress Note    Pt is a 44 year old woman with a history of pHTN (group I and IV on Remodulin and Xarelto, on home 2L O2), c/b R heart failure, history of RA thrombus s/p thrombectomy, history of DVT/PE (2016), multiple abdominal wall abscesses (s/p I&D), asthma, bradycardia s/p dual chamber PPM. She presents with worsening palpitations and SOB on exertion.      OVERNIGHT EVENTS/INTERVAL HPI: No acute events. HAs better.  Home Sildenafil just brought in today and will need to be verified by pharmacy.  Still awaiting [facility] for today.      OBJECTIVE:  ICU Vital Signs Last 24 Hrs  T(C): 36.7 (18 Oct 2024 05:00), Max: 36.7 (18 Oct 2024 05:00)  T(F): 98 (18 Oct 2024 05:00), Max: 98 (18 Oct 2024 05:00)  HR: 105 (18 Oct 2024 05:00) (81 - 113)  BP: 103/67 (18 Oct 2024 05:00) (103/67 - 119/81)  BP(mean): 94 (17 Oct 2024 17:09) (94 - 95)  ABP: --  ABP(mean): --  RR: 18 (18 Oct 2024 05:00) (18 - 19)  SpO2: 95% (18 Oct 2024 05:00) (92% - 97%)    O2 Parameters below as of 18 Oct 2024 05:00  Patient On (Oxygen Delivery Method): nasal cannula  O2 Flow (L/min): 2      I&O's Detail    17 Oct 2024 07:01  -  18 Oct 2024 07:00  --------------------------------------------------------  IN:    freetext medication - Infusion: 1.2 mL    Heparin Infusion: 291 mL    Oral Fluid: 480 mL  Total IN: 772.2 mL    OUT:    Voided (mL): 1500 mL  Total OUT: 1500 mL    Total NET: -727.8 mL      18 Oct 2024 07:01  -  18 Oct 2024 08:58  --------------------------------------------------------  IN:    freetext medication - Infusion: 0.1 mL    Heparin Infusion: 26 mL  Total IN: 26.1 mL    OUT:    Oral Fluid: 0 mL    Voided (mL): 900 mL  Total OUT: 900 mL    Total NET: -873.9 mL            Physical Exam:  GENERAL: Awake, alert and interactive, appears comfortable  NEURO: A&Ox4, no focal deficits, 5/5 strength in all ext, reflexes 2+ throughout, CN 2-12 intact  HEENT: Normocephalic, atraumatic, no conjunctivitis or scleral icterus, oral mucosa moist, no oral lesions noted  NECK: Supple, no LAD, no JVD, thyroid not palpable  CARDIAC: Tachycardic rate, Regular rhythm, +S1/S2, no murmurs/rubs/gallops  PULM: +Increased work of breath, clear to auscultation bilaterally, +Expiratory wheeze bilaterally, No rales/rhonchi  ABDOMEN: Soft, +epigastric tenderness to deep palpation, nondistended, +bs, no hepatosplenomegaly, no rebound tenderness or fluid wave, no CVA tenderness  MSK: Range of motion grossly intact, no back tenderness  SKIN: Warm and dry, no rashes, lesions  VASC: Cap refil < 2 sec, 2+ peripheral pulses, no edema, no LE tenderness  Psych: Appropriate affect      MEDICATIONS  (STANDING):  buDESOnide    Inhalation Suspension 0.5 milliGRAM(s) Inhalation two times a day  influenza   Vaccine 0.5 milliLiter(s) IntraMuscular once  ipratropium    for Nebulization 500 MICROGram(s) Nebulizer every 6 hours  montelukast 10 milliGRAM(s) Oral daily  pantoprazole    Tablet 40 milliGRAM(s) Oral before breakfast  predniSONE   Tablet 10 milliGRAM(s) Oral daily  rivaroxaban 20 milliGRAM(s) Oral with dinner  sildenafil (REVATIO) 20 milliGRAM(s) Oral <User Schedule>  spironolactone 50 milliGRAM(s) Oral daily  sucralfate 1 Gram(s) Oral two times a day  Treprostinil  SubQ continuous infusion 48 NANOGram(s)/kG/Min 0.052 mL/Hr (0.05 mL/Hr) IV Continuous <Continuous>    MEDICATIONS  (PRN):  acetaminophen     Tablet .. 650 milliGRAM(s) Oral every 6 hours PRN Temp greater or equal to 38C (100.4F), Mild Pain (1 - 3)  aluminum hydroxide/magnesium hydroxide/simethicone Suspension 30 milliLiter(s) Oral every 4 hours PRN Dyspepsia        Labs:                                              15.1   14.81 )-----------( 398      ( 18 Oct 2024 06:24 )             50.0     10-18    132[L]  |  97  |  19  ----------------------------<  85  5.6[H]   |  21[L]  |  0.95    Ca    9.0      18 Oct 2024 06:24  Phos  4.0     10-18  Mg     2.1     10-18    TPro  8.0  /  Alb  4.1  /  TBili  0.8  /  DBili  x   /  AST  43[H]  /  ALT  17  /  AlkPhos  66  10-18        Radiology:   < from: CT Angio Chest PE Protocol w/ IV Cont (10.14.24 @ 01:08) >  Mixing artifact in the main pulmonary arteries degrading evaluation. No   evidence of acute pulmonary embolism within the confines of this exam.    Patchy groundglass opacities in both lungs similar to that seen on prior   exam. Nonspecific finding could represent pulmonary edema or may be   infectious/inflammatory.    Dilated pulmonary arteries in keeping with patient's known pulmonary   hypertension.    < end of copied text >    < from: POCUS ED TTE 2D F/U, Limited w/o Cont. (10.14.24 @ 04:28) >  IMPRESSION:  Trace pericardial effusion with no sonographic signs of tamponade.  Severe right ventricular dilation.    < end of copied text >      < from: TTE W or WO Ultrasound Enhancing Agent (10.14.24 @ 17:00) >  CONCLUSIONS:      1. Technically difficult image quality.   2. Left ventricular cavity is small. The interventricular septum is flattened in systole and diastole consistent with right ventricular pressure and volume overload. Left ventricular systolic function is hyperdynamic with an ejection fraction visually estimated at > 70 %. There is poor visualization of the endocardial borders to determine the presence of wall motion abnormalities.   3. The right ventricle is not well visualized. enlarged right ventricular cavity size and reduced right ventricular systolic function.   4. Mild to moderate pulmonic regurgitation.   5. Pulmonary artery systolic pressure could not be estimated.   6. Compared to the transthoracic echocardiogram performed on 7/16/2024, the right ventricle was not as well visualized on this study. There is again echocardiographic evidence of right ventricular failure. Quantification of severity is limited.    < end of copied text >

## 2024-10-18 NOTE — PROGRESS NOTE ADULT - PROBLEM SELECTOR PLAN 1
-History of group 1 + 4 pHTN on Sildenafil and Remodulin   -pro-BNP elevated at 1861  -CXR with pulm edema  -TTE on 10/14 shows enlarged right ventricular cavity size and reduced right ventricular systolic function with evidence of right ventricular failure    Plan:  - Pulm recs appreciated  - C/w Sildenafil 20mg TID  - Increase Remodulin from 49ng to 51ng  - S/p Bumex 1mg IV BID - transitioned to bumex 1mg PO daily as pt euvolemic  - C/w Aldactone 50mg - switched to BID per HF  - RHC/LHC planned for 10/18/24  - Continue O2 therapy with NC, baseline 2 L  - Encourage ambulation as tolerated -History of group 1 + 4 pHTN on Sildenafil and Remodulin   -pro-BNP elevated at 1861  -CXR with pulm edema  -TTE on 10/14 shows enlarged right ventricular cavity size and reduced right ventricular systolic function with evidence of right ventricular failure    Plan:  - Pulm recs appreciated  - C/w Sildenafil 20mg TID  - Increase Remodulin from 49ng to 51ng  - S/p Bumex 1mg IV BID - transitioned to bumex 1mg PO daily as pt euvolemic  - C/w Aldactone 50mg - switched to BID per HF  - Pulm recommends decreasing prednisone 10 to 5mg daily  - RHC/LHC planned for 10/18/24  - Continue O2 therapy with NC, baseline 2 L  - Encourage ambulation as tolerated

## 2024-10-18 NOTE — PROGRESS NOTE ADULT - PROBLEM SELECTOR PLAN 5
Chronic Bradycardia s/p PPM. Presenting with palpitations.    Plan:  - F/u TSH  - S/p PPM interrogation on 10/14 - showed SVT  - F/u EP for possible change in monitor zone  - Telemetry monitoring Chronic Bradycardia s/p PPM. Presenting with palpitations.  Thyroid studies wnl.    Plan:  - F/u TSH  - S/p PPM interrogation on 10/14 - showed SVT  - F/u EP for possible change in monitor zone  - Telemetry monitoring

## 2024-10-18 NOTE — PRE-OP CHECKLIST - PATIENT SENT TO
holding area
Patient requests all Lab, Cardiology, and Radiology Results on their Discharge Instructions

## 2024-10-19 LAB
ANION GAP SERPL CALC-SCNC: 16 MMOL/L — SIGNIFICANT CHANGE UP (ref 5–17)
APTT BLD: 37 SEC — HIGH (ref 24.5–35.6)
BASOPHILS # BLD AUTO: 0.07 K/UL — SIGNIFICANT CHANGE UP (ref 0–0.2)
BASOPHILS NFR BLD AUTO: 0.5 % — SIGNIFICANT CHANGE UP (ref 0–2)
BUN SERPL-MCNC: 15 MG/DL — SIGNIFICANT CHANGE UP (ref 7–23)
CALCIUM SERPL-MCNC: 8.7 MG/DL — SIGNIFICANT CHANGE UP (ref 8.4–10.5)
CHLORIDE SERPL-SCNC: 96 MMOL/L — SIGNIFICANT CHANGE UP (ref 96–108)
CO2 SERPL-SCNC: 21 MMOL/L — LOW (ref 22–31)
CREAT SERPL-MCNC: 1.03 MG/DL — SIGNIFICANT CHANGE UP (ref 0.5–1.3)
CULTURE RESULTS: SIGNIFICANT CHANGE UP
CULTURE RESULTS: SIGNIFICANT CHANGE UP
EGFR: 69 ML/MIN/1.73M2 — SIGNIFICANT CHANGE UP
EOSINOPHIL # BLD AUTO: 0.35 K/UL — SIGNIFICANT CHANGE UP (ref 0–0.5)
EOSINOPHIL NFR BLD AUTO: 2.4 % — SIGNIFICANT CHANGE UP (ref 0–6)
GLUCOSE SERPL-MCNC: 130 MG/DL — HIGH (ref 70–99)
HCT VFR BLD CALC: 44.7 % — SIGNIFICANT CHANGE UP (ref 34.5–45)
HGB BLD-MCNC: 13.8 G/DL — SIGNIFICANT CHANGE UP (ref 11.5–15.5)
IMM GRANULOCYTES NFR BLD AUTO: 0.3 % — SIGNIFICANT CHANGE UP (ref 0–0.9)
LYMPHOCYTES # BLD AUTO: 1.48 K/UL — SIGNIFICANT CHANGE UP (ref 1–3.3)
LYMPHOCYTES # BLD AUTO: 10 % — LOW (ref 13–44)
MAGNESIUM SERPL-MCNC: 1.9 MG/DL — SIGNIFICANT CHANGE UP (ref 1.6–2.6)
MANUAL SMEAR VERIFICATION: SIGNIFICANT CHANGE UP
MCHC RBC-ENTMCNC: 21.2 PG — LOW (ref 27–34)
MCHC RBC-ENTMCNC: 30.9 GM/DL — LOW (ref 32–36)
MCV RBC AUTO: 68.6 FL — LOW (ref 80–100)
MONOCYTES # BLD AUTO: 1.09 K/UL — HIGH (ref 0–0.9)
MONOCYTES NFR BLD AUTO: 7.3 % — SIGNIFICANT CHANGE UP (ref 2–14)
NEUTROPHILS # BLD AUTO: 11.8 K/UL — HIGH (ref 1.8–7.4)
NEUTROPHILS NFR BLD AUTO: 79.5 % — HIGH (ref 43–77)
NRBC # BLD: 0 /100 WBCS — SIGNIFICANT CHANGE UP (ref 0–0)
PHOSPHATE SERPL-MCNC: 3.2 MG/DL — SIGNIFICANT CHANGE UP (ref 2.5–4.5)
PLAT MORPH BLD: NORMAL — SIGNIFICANT CHANGE UP
PLATELET # BLD AUTO: 323 K/UL — SIGNIFICANT CHANGE UP (ref 150–400)
POTASSIUM SERPL-MCNC: 4.1 MMOL/L — SIGNIFICANT CHANGE UP (ref 3.5–5.3)
POTASSIUM SERPL-SCNC: 4.1 MMOL/L — SIGNIFICANT CHANGE UP (ref 3.5–5.3)
RBC # BLD: 6.52 M/UL — HIGH (ref 3.8–5.2)
RBC # FLD: 19.3 % — HIGH (ref 10.3–14.5)
RBC BLD AUTO: SIGNIFICANT CHANGE UP
SODIUM SERPL-SCNC: 133 MMOL/L — LOW (ref 135–145)
SPECIMEN SOURCE: SIGNIFICANT CHANGE UP
SPECIMEN SOURCE: SIGNIFICANT CHANGE UP
WBC # BLD: 14.83 K/UL — HIGH (ref 3.8–10.5)
WBC # FLD AUTO: 14.83 K/UL — HIGH (ref 3.8–10.5)

## 2024-10-19 PROCEDURE — 99233 SBSQ HOSP IP/OBS HIGH 50: CPT | Mod: GC

## 2024-10-19 PROCEDURE — 99233 SBSQ HOSP IP/OBS HIGH 50: CPT

## 2024-10-19 RX ORDER — CETIRIZINE HCL 10 MG/1
5 TABLET ORAL DAILY
Refills: 0 | Status: DISCONTINUED | OUTPATIENT
Start: 2024-10-19 | End: 2024-10-19

## 2024-10-19 RX ORDER — FLUTICASONE PROPIONATE 50 UG/1
1 SPRAY, METERED NASAL
Refills: 0 | Status: COMPLETED | OUTPATIENT
Start: 2024-10-19 | End: 2024-10-21

## 2024-10-19 RX ORDER — DIPHENHYDRAMINE HCL 12.5MG/5ML
25 ELIXIR ORAL ONCE
Refills: 0 | Status: COMPLETED | OUTPATIENT
Start: 2024-10-19 | End: 2024-10-19

## 2024-10-19 RX ORDER — HYDROCORTISONE 1 %
1 OINTMENT (GRAM) TOPICAL
Refills: 0 | Status: DISCONTINUED | OUTPATIENT
Start: 2024-10-19 | End: 2024-10-30

## 2024-10-19 RX ORDER — NIFEDIPINE 90 MG
30 TABLET, EXTENDED RELEASE 24 HR ORAL DAILY
Refills: 0 | Status: DISCONTINUED | OUTPATIENT
Start: 2024-10-19 | End: 2024-10-22

## 2024-10-19 RX ADMIN — PANTOPRAZOLE SODIUM 40 MILLIGRAM(S): 40 TABLET, DELAYED RELEASE ORAL at 06:12

## 2024-10-19 RX ADMIN — Medication 5 MILLIGRAM(S): at 06:12

## 2024-10-19 RX ADMIN — Medication 50 MILLIGRAM(S): at 17:09

## 2024-10-19 RX ADMIN — Medication 20 MILLIGRAM(S): at 06:57

## 2024-10-19 RX ADMIN — Medication 2 MILLIGRAM(S): at 09:34

## 2024-10-19 RX ADMIN — Medication 30 MILLIGRAM(S): at 06:12

## 2024-10-19 RX ADMIN — BUDESONIDE 0.5 MILLIGRAM(S): 3 CAPSULE ORAL at 06:12

## 2024-10-19 RX ADMIN — Medication 1 APPLICATION(S): at 11:52

## 2024-10-19 RX ADMIN — Medication 1 MILLIGRAM(S): at 06:11

## 2024-10-19 RX ADMIN — IPRATROPIUM BROMIDE 500 MICROGRAM(S): 0.5 SOLUTION RESPIRATORY (INHALATION) at 11:57

## 2024-10-19 RX ADMIN — IPRATROPIUM BROMIDE 500 MICROGRAM(S): 0.5 SOLUTION RESPIRATORY (INHALATION) at 06:12

## 2024-10-19 RX ADMIN — MONTELUKAST SODIUM 10 MILLIGRAM(S): 10 TABLET, FILM COATED ORAL at 11:55

## 2024-10-19 RX ADMIN — Medication 20 MILLIGRAM(S): at 14:55

## 2024-10-19 RX ADMIN — Medication 650 MILLIGRAM(S): at 18:14

## 2024-10-19 RX ADMIN — Medication 50 MILLIGRAM(S): at 06:12

## 2024-10-19 RX ADMIN — Medication 30 MILLIGRAM(S): at 17:08

## 2024-10-19 RX ADMIN — Medication 20 MILLIGRAM(S): at 21:33

## 2024-10-19 RX ADMIN — Medication 650 MILLIGRAM(S): at 17:44

## 2024-10-19 RX ADMIN — RIVAROXABAN 20 MILLIGRAM(S): 20 TABLET, FILM COATED ORAL at 11:55

## 2024-10-19 RX ADMIN — Medication 2 MILLIGRAM(S): at 10:04

## 2024-10-19 RX ADMIN — BUDESONIDE 0.5 MILLIGRAM(S): 3 CAPSULE ORAL at 17:10

## 2024-10-19 RX ADMIN — FLUTICASONE PROPIONATE 1 SPRAY(S): 50 SPRAY, METERED NASAL at 17:10

## 2024-10-19 RX ADMIN — IPRATROPIUM BROMIDE 500 MICROGRAM(S): 0.5 SOLUTION RESPIRATORY (INHALATION) at 17:09

## 2024-10-19 RX ADMIN — Medication 25 MILLIGRAM(S): at 11:55

## 2024-10-19 NOTE — PROGRESS NOTE ADULT - SUBJECTIVE AND OBJECTIVE BOX
Internal Medicine Progress Note    Pt is a 44 year old woman with a history of pHTN (group I and IV on Rimodulin and Xarelto, on home 2L O2), c/b R heart failure, history of RA thrombus s/p thrombectomy, history of DVT/PE (2016), multiple abdominal wall abscesses (s/p I&D), asthma, bradycardia s/p dual chamber PPM. She presents with worsening palpitations and SOB on exertion.      OVERNIGHT EVENTS/INTERVAL HPI: NAEON. Pt examined at bedside this morning. She reports walking to the bathroom yesterday and sitting up in chair. States she felt tachycardic/palpitations while walking, but did not experience dyspnea, chest pain, or epigastric pain. Endorses 8/10 throbbing headache this morning. Pt asking for Dilaudid. She report that her  did not bring the home dose of sildenafil, but should be bringing it today.  Reports urinating frequently more today 2/2 diuresis. She endorses sinus congestion. Denies abdominal pain. Overall, she reports feeling better other than her headaches.       OBJECTIVE:  ICU Vital Signs Last 24 Hrs  T(C): 36.7 (18 Oct 2024 05:00), Max: 36.7 (18 Oct 2024 05:00)  T(F): 98 (18 Oct 2024 05:00), Max: 98 (18 Oct 2024 05:00)  HR: 105 (18 Oct 2024 05:00) (81 - 113)  BP: 103/67 (18 Oct 2024 05:00) (103/67 - 119/81)  BP(mean): 94 (17 Oct 2024 17:09) (94 - 95)  ABP: --  ABP(mean): --  RR: 18 (18 Oct 2024 05:00) (18 - 19)  SpO2: 95% (18 Oct 2024 05:00) (92% - 97%)    O2 Parameters below as of 18 Oct 2024 05:00  Patient On (Oxygen Delivery Method): nasal cannula  O2 Flow (L/min): 2      I&O's Detail    17 Oct 2024 07:01  -  18 Oct 2024 07:00  --------------------------------------------------------  IN:    freetext medication - Infusion: 1.2 mL    Heparin Infusion: 291 mL    Oral Fluid: 480 mL  Total IN: 772.2 mL    OUT:    Voided (mL): 1500 mL  Total OUT: 1500 mL    Total NET: -727.8 mL      18 Oct 2024 07:01  -  18 Oct 2024 08:58  --------------------------------------------------------  IN:    freetext medication - Infusion: 0.1 mL    Heparin Infusion: 26 mL  Total IN: 26.1 mL    OUT:    Oral Fluid: 0 mL    Voided (mL): 900 mL  Total OUT: 900 mL    Total NET: -873.9 mL          Physical Exam:  GENERAL: Awake, alert and interactive, appears comfortable  NEURO: A&Ox4, no focal deficits, 5/5 strength in all ext, reflexes 2+ throughout, CN 2-12 intact  HEENT: Normocephalic, atraumatic, no conjunctivitis or scleral icterus, oral mucosa moist, no oral lesions noted  NECK: Supple, no LAD, no JVD, thyroid not palpable  CARDIAC: Tachycardic rate, Regular rhythm, +S1/S2, no murmurs/rubs/gallops  PULM: +Increased work of breath, clear to auscultation bilaterally, +Expiratory wheeze bilaterally, No rales/rhonchi  ABDOMEN: Soft, +epigastric tenderness to deep palpation, nondistended, +bs, no hepatosplenomegaly, no rebound tenderness or fluid wave, no CVA tenderness  MSK: Range of motion grossly intact, no back tenderness  SKIN: Warm and dry, no rashes, lesions  VASC: Peripheral pulses, no edema, no LE tenderness  Psych: Appropriate affect      MEDICATIONS  (STANDING):  buDESOnide    Inhalation Suspension 0.5 milliGRAM(s) Inhalation two times a day  influenza   Vaccine 0.5 milliLiter(s) IntraMuscular once  ipratropium    for Nebulization 500 MICROGram(s) Nebulizer every 6 hours  montelukast 10 milliGRAM(s) Oral daily  pantoprazole    Tablet 40 milliGRAM(s) Oral before breakfast  predniSONE   Tablet 10 milliGRAM(s) Oral daily  rivaroxaban 20 milliGRAM(s) Oral with dinner  sildenafil (REVATIO) 20 milliGRAM(s) Oral <User Schedule>  spironolactone 50 milliGRAM(s) Oral daily  sucralfate 1 Gram(s) Oral two times a day  Treprostinil  SubQ continuous infusion 48 NANOGram(s)/kG/Min 0.052 mL/Hr (0.05 mL/Hr) IV Continuous <Continuous>    MEDICATIONS  (PRN):  acetaminophen     Tablet .. 650 milliGRAM(s) Oral every 6 hours PRN Temp greater or equal to 38C (100.4F), Mild Pain (1 - 3)  aluminum hydroxide/magnesium hydroxide/simethicone Suspension 30 milliLiter(s) Oral every 4 hours PRN Dyspepsia        Labs:                                              15.1   14.81 )-----------( 398      ( 18 Oct 2024 06:24 )             50.0     10-18    132[L]  |  97  |  19  ----------------------------<  85  5.6[H]   |  21[L]  |  0.95    Ca    9.0      18 Oct 2024 06:24  Phos  4.0     10-18  Mg     2.1     10-18    TPro  8.0  /  Alb  4.1  /  TBili  0.8  /  DBili  x   /  AST  43[H]  /  ALT  17  /  AlkPhos  66  10-18        Radiology:   < from: CT Angio Chest PE Protocol w/ IV Cont (10.14.24 @ 01:08) >  Mixing artifact in the main pulmonary arteries degrading evaluation. No   evidence of acute pulmonary embolism within the confines of this exam.    Patchy groundglass opacities in both lungs similar to that seen on prior   exam. Nonspecific finding could represent pulmonary edema or may be   infectious/inflammatory.    Dilated pulmonary arteries in keeping with patient's known pulmonary   hypertension.    < end of copied text >    < from: POCUS ED TTE 2D F/U, Limited w/o Cont. (10.14.24 @ 04:28) >  IMPRESSION:  Trace pericardial effusion with no sonographic signs of tamponade.  Severe right ventricular dilation.    < end of copied text >      < from: TTE W or WO Ultrasound Enhancing Agent (10.14.24 @ 17:00) >  CONCLUSIONS:      1. Technically difficult image quality.   2. Left ventricular cavity is small. The interventricular septum is flattened in systole and diastole consistent with right ventricular pressure and volume overload. Left ventricular systolic function is hyperdynamic with an ejection fraction visually estimated at > 70 %. There is poor visualization of the endocardial borders to determine the presence of wall motion abnormalities.   3. The right ventricle is not well visualized. enlarged right ventricular cavity size and reduced right ventricular systolic function.   4. Mild to moderate pulmonic regurgitation.   5. Pulmonary artery systolic pressure could not be estimated.   6. Compared to the transthoracic echocardiogram performed on 7/16/2024, the right ventricle was not as well visualized on this study. There is again echocardiographic evidence of right ventricular failure. Quantification of severity is limited.    < end of copied text >     Internal Medicine Progress Note    Pt is a 44 year old woman with a history of pHTN (group I and IV on Rimodulin and Xarelto, on home 2L O2), c/b R heart failure, history of RA thrombus s/p thrombectomy, history of DVT/PE (2016), multiple abdominal wall abscesses (s/p I&D), asthma, bradycardia s/p dual chamber PPM. She presents with worsening palpitations and SOB on exertion.      OVERNIGHT EVENTS/INTERVAL HPI: NAEON. Pt examined at bedside this morning. Tolerated RHC/LHC well. Today endorses new neck pain from the procedure. She received her home sildenafil and states the HA has improved. She denies palpitations, dyspnea, chest pain, or epigastric or abdominal pain.  She endorses sinus congestion. Last BM was 2 days ago.    OBJECTIVE:  Vital Signs Last 24 Hrs  T(C): 36.8 (19 Oct 2024 05:00), Max: 36.8 (18 Oct 2024 20:45)  T(F): 98.2 (19 Oct 2024 05:00), Max: 98.2 (18 Oct 2024 20:45)  HR: 100 (19 Oct 2024 05:00) (89 - 112)  I&O's Detail    18 Oct 2024 07:01  -  19 Oct 2024 07:00  --------------------------------------------------------  IN:    freetext medication - Infusion: 0.8 mL    Heparin Infusion: 26 mL    Oral Fluid: 550 mL  Total IN: 576.8 mL    OUT:    Voided (mL): 1150 mL  Total OUT: 1150 mL    Total NET: -573.2 mL      BP: 107/71 (19 Oct 2024 05:00) (99/67 - 120/70)  BP(mean): 78 (18 Oct 2024 21:20) (78 - 89)  RR: 18 (19 Oct 2024 05:00) (16 - 18)  SpO2: 95% (19 Oct 2024 05:00) (94% - 99%)    Parameters below as of 19 Oct 2024 05:00  Patient On (Oxygen Delivery Method): nasal cannula  O2 Flow (L/min): 2        Physical Exam:  GENERAL: Awake, alert and interactive, appears comfortable  NEURO: A&Ox4  HEENT: Normocephalic, atraumatic, no conjunctivitis or scleral icterus, oral mucosa moist, no oral lesions noted  NECK: Supple, no LAD, no JVD  CARDIAC: Tachycardic rate, Regular rhythm, +S1/S2, no murmurs/rubs/gallops  PULM: No increased work of breath, clear to auscultation bilaterally, No wheezing/rales/rhonchi  ABDOMEN: Soft, +epigastric tenderness to deep palpation, nondistended, +bs, no hepatosplenomegaly, no rebound tenderness or fluid wave, no CVA tenderness  MSK: Range of motion grossly intact, no back tenderness  SKIN: Warm and dry, no rashes, lesions. No erythema/warmth/tenderness of cath insertion sites  VASC: Peripheral pulses intact, no edema, no LE tenderness      MEDICATIONS  (STANDING):  buDESOnide    Inhalation Suspension 0.5 milliGRAM(s) Inhalation two times a day  influenza   Vaccine 0.5 milliLiter(s) IntraMuscular once  ipratropium    for Nebulization 500 MICROGram(s) Nebulizer every 6 hours  montelukast 10 milliGRAM(s) Oral daily  pantoprazole    Tablet 40 milliGRAM(s) Oral before breakfast  predniSONE   Tablet 10 milliGRAM(s) Oral daily  rivaroxaban 20 milliGRAM(s) Oral with dinner  sildenafil (REVATIO) 20 milliGRAM(s) Oral <User Schedule>  spironolactone 50 milliGRAM(s) Oral daily  sucralfate 1 Gram(s) Oral two times a day  Treprostinil  SubQ continuous infusion 48 NANOGram(s)/kG/Min 0.052 mL/Hr (0.05 mL/Hr) IV Continuous <Continuous>    MEDICATIONS  (PRN):  acetaminophen     Tablet .. 650 milliGRAM(s) Oral every 6 hours PRN Temp greater or equal to 38C (100.4F), Mild Pain (1 - 3)  aluminum hydroxide/magnesium hydroxide/simethicone Suspension 30 milliLiter(s) Oral every 4 hours PRN Dyspepsia        Labs:                                              15.1   14.81 )-----------( 398      ( 18 Oct 2024 06:24 )             50.0     10-18    132[L]  |  97  |  19  ----------------------------<  85  5.6[H]   |  21[L]  |  0.95    Ca    9.0      18 Oct 2024 06:24  Phos  4.0     10-18  Mg     2.1     10-18    TPro  8.0  /  Alb  4.1  /  TBili  0.8  /  DBili  x   /  AST  43[H]  /  ALT  17  /  AlkPhos  66  10-18    PT/INR - ( 18 Oct 2024 06:24 )   PT: 11.8 sec;   INR: 1.03 ratio         PTT - ( 18 Oct 2024 16:39 )  PTT:124.4 sec    Radiology:     < from: Cardiac Catheterization (10.18.24 @ 18:20) >  Diagnostic Conclusions:     Baseline 2L NC   Normal right atrial pressure (mRA 3mmHg with a V wave of 5mmHg)   Severe pre-capillary pulmonary hypertension (sPAP 74mmHg, dPAP 43mmHg,  mPAP 55mmHg)  PCWP = 9mmHg with a V wave of 11mmHg   LVEDP = 7mmHg   PAsat = 61.8% // AOsat = 96.6% (2L NC)   Xenia CO // CI = 3.86l/min // 1.88l/min/m2   Thermo CO // CI = 3.47l/min // 1.69l/min/m2   SBP = 108/63/79     Status post administration of 40ppm of nitric oxide    Moderate pulmonary hypertension (sPAP 56mmHg, dPAP 33mmHg, mPAP  43mmHg)  LVEDP = 6mmHg   PAsat = 72.6% // AOsat = 99.2% (2L NC)   Xenia CO // CI = 5.19l/min // 2.53l/min/m2   Thermo CO // CI = 3.60l/min // 1.75l/min/m2   SBP = 111/68/84     < end of copied text >      < from: CT Angio Chest PE Protocol w/ IV Cont (10.14.24 @ 01:08) >  Mixing artifact in the main pulmonary arteries degrading evaluation. No   evidence of acute pulmonary embolism within the confines of this exam.    Patchy groundglass opacities in both lungs similar to that seen on prior   exam. Nonspecific finding could represent pulmonary edema or may be   infectious/inflammatory.    Dilated pulmonary arteries in keeping with patient's known pulmonary   hypertension.    < end of copied text >    < from: POCUS ED TTE 2D F/U, Limited w/o Cont. (10.14.24 @ 04:28) >  IMPRESSION:  Trace pericardial effusion with no sonographic signs of tamponade.  Severe right ventricular dilation.    < end of copied text >      < from: TTE W or WO Ultrasound Enhancing Agent (10.14.24 @ 17:00) >  CONCLUSIONS:      1. Technically difficult image quality.   2. Left ventricular cavity is small. The interventricular septum is flattened in systole and diastole consistent with right ventricular pressure and volume overload. Left ventricular systolic function is hyperdynamic with an ejection fraction visually estimated at > 70 %. There is poor visualization of the endocardial borders to determine the presence of wall motion abnormalities.   3. The right ventricle is not well visualized. enlarged right ventricular cavity size and reduced right ventricular systolic function.   4. Mild to moderate pulmonic regurgitation.   5. Pulmonary artery systolic pressure could not be estimated.   6. Compared to the transthoracic echocardiogram performed on 7/16/2024, the right ventricle was not as well visualized on this study. There is again echocardiographic evidence of right ventricular failure. Quantification of severity is limited.    < end of copied text >     Internal Medicine Progress Note    Pt is a 44 year old woman with a history of pHTN (group I and IV on Rimodulin and Xarelto, on home 2L O2), c/b R heart failure, history of RA thrombus s/p thrombectomy, history of DVT/PE (2016), multiple abdominal wall abscesses (s/p I&D), asthma, bradycardia s/p dual chamber PPM. She presents with worsening palpitations and SOB on exertion.      OVERNIGHT EVENTS/INTERVAL HPI: NAEON. Pt examined at bedside this morning. Tolerated RHC/LHC well. Today endorses new neck pain from the procedure. She received her home sildenafil and states the HA has improved. She denies palpitations, dyspnea, chest pain, or epigastric or abdominal pain.  She endorses sinus congestion. Last BM was 2 days ago.    OBJECTIVE:  Vital Signs Last 24 Hrs  T(C): 36.8 (19 Oct 2024 05:00), Max: 36.8 (18 Oct 2024 20:45)  T(F): 98.2 (19 Oct 2024 05:00), Max: 98.2 (18 Oct 2024 20:45)  HR: 100 (19 Oct 2024 05:00) (89 - 112)  I&O's Detail    18 Oct 2024 07:01  -  19 Oct 2024 07:00  --------------------------------------------------------  IN:    freetext medication - Infusion: 0.8 mL    Heparin Infusion: 26 mL    Oral Fluid: 550 mL  Total IN: 576.8 mL    OUT:    Voided (mL): 1150 mL  Total OUT: 1150 mL    Total NET: -573.2 mL      BP: 107/71 (19 Oct 2024 05:00) (99/67 - 120/70)  BP(mean): 78 (18 Oct 2024 21:20) (78 - 89)  RR: 18 (19 Oct 2024 05:00) (16 - 18)  SpO2: 95% (19 Oct 2024 05:00) (94% - 99%)    Parameters below as of 19 Oct 2024 05:00  Patient On (Oxygen Delivery Method): nasal cannula  O2 Flow (L/min): 2        Physical Exam:  GENERAL: Awake, alert and interactive, appears comfortable  NEURO: A&Ox4  HEENT: Normocephalic, atraumatic, no conjunctivitis or scleral icterus, oral mucosa moist, no oral lesions noted  NECK: Supple, no LAD, no JVD  CARDIAC: Tachycardic rate, Regular rhythm, +S1/S2, no murmurs/rubs/gallops  PULM: No increased work of breath, clear to auscultation bilaterally, No wheezing/rales/rhonchi  ABDOMEN: Soft, +epigastric tenderness to deep palpation, nondistended, +bs, no hepatosplenomegaly, no rebound tenderness or fluid wave, no CVA tenderness  MSK: Range of motion grossly intact, no back tenderness  SKIN: Warm and dry, no rashes, lesions. No erythema/warmth/tenderness of cath insertion sites  VASC: Peripheral pulses intact, no edema, no LE tenderness      MEDICATIONS  (STANDING):  buDESOnide    Inhalation Suspension 0.5 milliGRAM(s) Inhalation two times a day  influenza   Vaccine 0.5 milliLiter(s) IntraMuscular once  ipratropium    for Nebulization 500 MICROGram(s) Nebulizer every 6 hours  montelukast 10 milliGRAM(s) Oral daily  pantoprazole    Tablet 40 milliGRAM(s) Oral before breakfast  predniSONE   Tablet 10 milliGRAM(s) Oral daily  rivaroxaban 20 milliGRAM(s) Oral with dinner  sildenafil (REVATIO) 20 milliGRAM(s) Oral <User Schedule>  spironolactone 50 milliGRAM(s) Oral daily  sucralfate 1 Gram(s) Oral two times a day  Treprostinil  SubQ continuous infusion 48 NANOGram(s)/kG/Min 0.052 mL/Hr (0.05 mL/Hr) IV Continuous <Continuous>    MEDICATIONS  (PRN):  acetaminophen     Tablet .. 650 milliGRAM(s) Oral every 6 hours PRN Temp greater or equal to 38C (100.4F), Mild Pain (1 - 3)  aluminum hydroxide/magnesium hydroxide/simethicone Suspension 30 milliLiter(s) Oral every 4 hours PRN Dyspepsia        Labs:                                               13.8   14.83 )-----------( 323      ( 19 Oct 2024 09:44 )             44.7     10-18    132[L]  |  97  |  19  ----------------------------<  85  5.6[H]   |  21[L]  |  0.95    Ca    9.0      18 Oct 2024 06:24  Phos  4.0     10-18  Mg     2.1     10-18    TPro  8.0  /  Alb  4.1  /  TBili  0.8  /  DBili  x   /  AST  43[H]  /  ALT  17  /  AlkPhos  66  10-18    PT/INR - ( 18 Oct 2024 06:24 )   PT: 11.8 sec;   INR: 1.03 ratio         PTT - ( 19 Oct 2024 09:45 )  PTT:37.0 sec    Radiology:     < from: Cardiac Catheterization (10.18.24 @ 18:20) >  Diagnostic Conclusions:     Baseline 2L NC   Normal right atrial pressure (mRA 3mmHg with a V wave of 5mmHg)   Severe pre-capillary pulmonary hypertension (sPAP 74mmHg, dPAP 43mmHg,  mPAP 55mmHg)  PCWP = 9mmHg with a V wave of 11mmHg   LVEDP = 7mmHg   PAsat = 61.8% // AOsat = 96.6% (2L NC)   Xenia CO // CI = 3.86l/min // 1.88l/min/m2   Thermo CO // CI = 3.47l/min // 1.69l/min/m2   SBP = 108/63/79     Status post administration of 40ppm of nitric oxide    Moderate pulmonary hypertension (sPAP 56mmHg, dPAP 33mmHg, mPAP  43mmHg)  LVEDP = 6mmHg   PAsat = 72.6% // AOsat = 99.2% (2L NC)   Xenia CO // CI = 5.19l/min // 2.53l/min/m2   Thermo CO // CI = 3.60l/min // 1.75l/min/m2   SBP = 111/68/84     < end of copied text >      < from: CT Angio Chest PE Protocol w/ IV Cont (10.14.24 @ 01:08) >  Mixing artifact in the main pulmonary arteries degrading evaluation. No   evidence of acute pulmonary embolism within the confines of this exam.    Patchy groundglass opacities in both lungs similar to that seen on prior   exam. Nonspecific finding could represent pulmonary edema or may be   infectious/inflammatory.    Dilated pulmonary arteries in keeping with patient's known pulmonary   hypertension.    < end of copied text >    < from: POCUS ED TTE 2D F/U, Limited w/o Cont. (10.14.24 @ 04:28) >  IMPRESSION:  Trace pericardial effusion with no sonographic signs of tamponade.  Severe right ventricular dilation.    < end of copied text >      < from: TTE W or WO Ultrasound Enhancing Agent (10.14.24 @ 17:00) >  CONCLUSIONS:      1. Technically difficult image quality.   2. Left ventricular cavity is small. The interventricular septum is flattened in systole and diastole consistent with right ventricular pressure and volume overload. Left ventricular systolic function is hyperdynamic with an ejection fraction visually estimated at > 70 %. There is poor visualization of the endocardial borders to determine the presence of wall motion abnormalities.   3. The right ventricle is not well visualized. enlarged right ventricular cavity size and reduced right ventricular systolic function.   4. Mild to moderate pulmonic regurgitation.   5. Pulmonary artery systolic pressure could not be estimated.   6. Compared to the transthoracic echocardiogram performed on 7/16/2024, the right ventricle was not as well visualized on this study. There is again echocardiographic evidence of right ventricular failure. Quantification of severity is limited.    < end of copied text >     Internal Medicine Progress Note    Pt is a 44 year old woman with a history of pHTN (group I and IV on Rimodulin and Xarelto, on home 2L O2), c/b R heart failure, history of RA thrombus s/p thrombectomy, history of DVT/PE (2016), multiple abdominal wall abscesses (s/p I&D), asthma, bradycardia s/p dual chamber PPM. She presents with worsening palpitations and SOB on exertion.      OVERNIGHT EVENTS/INTERVAL HPI: NAEON. Pt examined at bedside this morning. Tolerated RHC/LHC well. Today endorses new neck pain from the procedure. She received her home sildenafil and states the HA has improved. She denies palpitations, dyspnea, chest pain, or epigastric or abdominal pain.  She endorses sinus congestion. Last BM was 2 days ago.    OBJECTIVE:  Vital Signs Last 24 Hrs  T(C): 36.8 (19 Oct 2024 05:00), Max: 36.8 (18 Oct 2024 20:45)  T(F): 98.2 (19 Oct 2024 05:00), Max: 98.2 (18 Oct 2024 20:45)  HR: 100 (19 Oct 2024 05:00) (89 - 112)  I&O's Detail    18 Oct 2024 07:01  -  19 Oct 2024 07:00  --------------------------------------------------------  IN:    freetext medication - Infusion: 0.8 mL    Heparin Infusion: 26 mL    Oral Fluid: 550 mL  Total IN: 576.8 mL    OUT:    Voided (mL): 1150 mL  Total OUT: 1150 mL    Total NET: -573.2 mL      BP: 107/71 (19 Oct 2024 05:00) (99/67 - 120/70)  BP(mean): 78 (18 Oct 2024 21:20) (78 - 89)  RR: 18 (19 Oct 2024 05:00) (16 - 18)  SpO2: 95% (19 Oct 2024 05:00) (94% - 99%)    Parameters below as of 19 Oct 2024 05:00  Patient On (Oxygen Delivery Method): nasal cannula  O2 Flow (L/min): 2        Physical Exam:  GENERAL: Awake, alert and interactive, appears comfortable  NEURO: A&Ox4  HEENT: Normocephalic, atraumatic, no conjunctivitis or scleral icterus, oral mucosa moist, no oral lesions noted  NECK: Supple, no LAD, no JVD  CARDIAC: Tachycardic rate, Regular rhythm, +S1/S2, no murmurs/rubs/gallops  PULM: No increased work of breath, clear to auscultation bilaterally, No wheezing/rales/rhonchi  ABDOMEN: Soft, +epigastric tenderness to deep palpation, nondistended, +bs, no hepatosplenomegaly, no rebound tenderness or fluid wave, no CVA tenderness  MSK: Range of motion grossly intact, no back tenderness  SKIN: Warm and dry, no rashes, lesions. No erythema/warmth/tenderness of cath insertion sites  VASC: Peripheral pulses intact, no edema, no LE tenderness      MEDICATIONS  (STANDING):  buDESOnide    Inhalation Suspension 0.5 milliGRAM(s) Inhalation two times a day  influenza   Vaccine 0.5 milliLiter(s) IntraMuscular once  ipratropium    for Nebulization 500 MICROGram(s) Nebulizer every 6 hours  montelukast 10 milliGRAM(s) Oral daily  pantoprazole    Tablet 40 milliGRAM(s) Oral before breakfast  predniSONE   Tablet 10 milliGRAM(s) Oral daily  rivaroxaban 20 milliGRAM(s) Oral with dinner  sildenafil (REVATIO) 20 milliGRAM(s) Oral <User Schedule>  spironolactone 50 milliGRAM(s) Oral daily  sucralfate 1 Gram(s) Oral two times a day  Treprostinil  SubQ continuous infusion 48 NANOGram(s)/kG/Min 0.052 mL/Hr (0.05 mL/Hr) IV Continuous <Continuous>    MEDICATIONS  (PRN):  acetaminophen     Tablet .. 650 milliGRAM(s) Oral every 6 hours PRN Temp greater or equal to 38C (100.4F), Mild Pain (1 - 3)  aluminum hydroxide/magnesium hydroxide/simethicone Suspension 30 milliLiter(s) Oral every 4 hours PRN Dyspepsia        Labs:                                               13.8   14.83 )-----------( 323      ( 19 Oct 2024 09:44 )             44.7     10-19    133[L]  |  96  |  15  ----------------------------<  130[H]  4.1   |  21[L]  |  1.03    Ca    8.7      19 Oct 2024 09:45  Phos  3.2     10-19  Mg     1.9     10-19    TPro  8.0  /  Alb  4.1  /  TBili  0.8  /  DBili  x   /  AST  43[H]  /  ALT  17  /  AlkPhos  66  10-18      PT/INR - ( 18 Oct 2024 06:24 )   PT: 11.8 sec;   INR: 1.03 ratio         PTT - ( 19 Oct 2024 09:45 )  PTT:37.0 sec    Radiology:     < from: Cardiac Catheterization (10.18.24 @ 18:20) >  Diagnostic Conclusions:     Baseline 2L NC   Normal right atrial pressure (mRA 3mmHg with a V wave of 5mmHg)   Severe pre-capillary pulmonary hypertension (sPAP 74mmHg, dPAP 43mmHg,  mPAP 55mmHg)  PCWP = 9mmHg with a V wave of 11mmHg   LVEDP = 7mmHg   PAsat = 61.8% // AOsat = 96.6% (2L NC)   Xenia CO // CI = 3.86l/min // 1.88l/min/m2   Thermo CO // CI = 3.47l/min // 1.69l/min/m2   SBP = 108/63/79     Status post administration of 40ppm of nitric oxide    Moderate pulmonary hypertension (sPAP 56mmHg, dPAP 33mmHg, mPAP  43mmHg)  LVEDP = 6mmHg   PAsat = 72.6% // AOsat = 99.2% (2L NC)   Xenia CO // CI = 5.19l/min // 2.53l/min/m2   Thermo CO // CI = 3.60l/min // 1.75l/min/m2   SBP = 111/68/84     < end of copied text >      < from: CT Angio Chest PE Protocol w/ IV Cont (10.14.24 @ 01:08) >  Mixing artifact in the main pulmonary arteries degrading evaluation. No   evidence of acute pulmonary embolism within the confines of this exam.    Patchy groundglass opacities in both lungs similar to that seen on prior   exam. Nonspecific finding could represent pulmonary edema or may be   infectious/inflammatory.    Dilated pulmonary arteries in keeping with patient's known pulmonary   hypertension.    < end of copied text >    < from: POCUS ED TTE 2D F/U, Limited w/o Cont. (10.14.24 @ 04:28) >  IMPRESSION:  Trace pericardial effusion with no sonographic signs of tamponade.  Severe right ventricular dilation.    < end of copied text >      < from: TTE W or WO Ultrasound Enhancing Agent (10.14.24 @ 17:00) >  CONCLUSIONS:      1. Technically difficult image quality.   2. Left ventricular cavity is small. The interventricular septum is flattened in systole and diastole consistent with right ventricular pressure and volume overload. Left ventricular systolic function is hyperdynamic with an ejection fraction visually estimated at > 70 %. There is poor visualization of the endocardial borders to determine the presence of wall motion abnormalities.   3. The right ventricle is not well visualized. enlarged right ventricular cavity size and reduced right ventricular systolic function.   4. Mild to moderate pulmonic regurgitation.   5. Pulmonary artery systolic pressure could not be estimated.   6. Compared to the transthoracic echocardiogram performed on 7/16/2024, the right ventricle was not as well visualized on this study. There is again echocardiographic evidence of right ventricular failure. Quantification of severity is limited.    < end of copied text >

## 2024-10-19 NOTE — PROGRESS NOTE ADULT - SUBJECTIVE AND OBJECTIVE BOX
Internal Medicine Progress Note    Pt is a 44 year old woman with a history of pHTN (group I and IV on Remodulin and Xarelto, on home 2L O2), c/b R heart failure, history of RA thrombus s/p thrombectomy, history of DVT/PE (2016), multiple abdominal wall abscesses (s/p I&D), asthma, bradycardia s/p dual chamber PPM. She presents with worsening palpitations and SOB on exertion.      OVERNIGHT EVENTS/INTERVAL HPI: S/p RHC/LHC yesterday.      OBJECTIVE:  ICU Vital Signs Last 24 Hrs  T(C): 36.7 (18 Oct 2024 05:00), Max: 36.7 (18 Oct 2024 05:00)  T(F): 98 (18 Oct 2024 05:00), Max: 98 (18 Oct 2024 05:00)  HR: 105 (18 Oct 2024 05:00) (81 - 113)  BP: 103/67 (18 Oct 2024 05:00) (103/67 - 119/81)  BP(mean): 94 (17 Oct 2024 17:09) (94 - 95)  ABP: --  ABP(mean): --  RR: 18 (18 Oct 2024 05:00) (18 - 19)  SpO2: 95% (18 Oct 2024 05:00) (92% - 97%)    O2 Parameters below as of 18 Oct 2024 05:00  Patient On (Oxygen Delivery Method): nasal cannula  O2 Flow (L/min): 2      I&O's Detail    17 Oct 2024 07:01  -  18 Oct 2024 07:00  --------------------------------------------------------  IN:    freetext medication - Infusion: 1.2 mL    Heparin Infusion: 291 mL    Oral Fluid: 480 mL  Total IN: 772.2 mL    OUT:    Voided (mL): 1500 mL  Total OUT: 1500 mL    Total NET: -727.8 mL      18 Oct 2024 07:01  -  18 Oct 2024 08:58  --------------------------------------------------------  IN:    freetext medication - Infusion: 0.1 mL    Heparin Infusion: 26 mL  Total IN: 26.1 mL    OUT:    Oral Fluid: 0 mL    Voided (mL): 900 mL  Total OUT: 900 mL    Total NET: -873.9 mL          Physical Exam:  GENERAL: Awake, alert and interactive, appears comfortable  NEURO: A&Ox4, no focal deficits, 5/5 strength in all ext, reflexes 2+ throughout, CN 2-12 intact  HEENT: Normocephalic, atraumatic, no conjunctivitis or scleral icterus, oral mucosa moist, no oral lesions noted  NECK: Supple, no LAD, no JVD, thyroid not palpable  CARDIAC: Tachycardic rate, Regular rhythm, +S1/S2, no murmurs/rubs/gallops  PULM: +Increased work of breath, clear to auscultation bilaterally, +Expiratory wheeze bilaterally, No rales/rhonchi  ABDOMEN: Soft, +epigastric tenderness to deep palpation, nondistended, +bs, no hepatosplenomegaly, no rebound tenderness or fluid wave, no CVA tenderness  MSK: Range of motion grossly intact, no back tenderness  SKIN: Warm and dry, no rashes, lesions  VASC: Peripheral pulses, no edema, no LE tenderness  Psych: Appropriate affect      MEDICATIONS  (STANDING):  buDESOnide    Inhalation Suspension 0.5 milliGRAM(s) Inhalation two times a day  influenza   Vaccine 0.5 milliLiter(s) IntraMuscular once  ipratropium    for Nebulization 500 MICROGram(s) Nebulizer every 6 hours  montelukast 10 milliGRAM(s) Oral daily  pantoprazole    Tablet 40 milliGRAM(s) Oral before breakfast  predniSONE   Tablet 10 milliGRAM(s) Oral daily  rivaroxaban 20 milliGRAM(s) Oral with dinner  sildenafil (REVATIO) 20 milliGRAM(s) Oral <User Schedule>  spironolactone 50 milliGRAM(s) Oral daily  sucralfate 1 Gram(s) Oral two times a day  Treprostinil  SubQ continuous infusion 48 NANOGram(s)/kG/Min 0.052 mL/Hr (0.05 mL/Hr) IV Continuous <Continuous>    MEDICATIONS  (PRN):  acetaminophen     Tablet .. 650 milliGRAM(s) Oral every 6 hours PRN Temp greater or equal to 38C (100.4F), Mild Pain (1 - 3)  aluminum hydroxide/magnesium hydroxide/simethicone Suspension 30 milliLiter(s) Oral every 4 hours PRN Dyspepsia        Labs:                                              15.1   14.81 )-----------( 398      ( 18 Oct 2024 06:24 )             50.0     10-18    132[L]  |  97  |  19  ----------------------------<  85  5.6[H]   |  21[L]  |  0.95    Ca    9.0      18 Oct 2024 06:24  Phos  4.0     10-18  Mg     2.1     10-18    TPro  8.0  /  Alb  4.1  /  TBili  0.8  /  DBili  x   /  AST  43[H]  /  ALT  17  /  AlkPhos  66  10-18        Radiology:   < from: CT Angio Chest PE Protocol w/ IV Cont (10.14.24 @ 01:08) >  Mixing artifact in the main pulmonary arteries degrading evaluation. No   evidence of acute pulmonary embolism within the confines of this exam.    Patchy groundglass opacities in both lungs similar to that seen on prior   exam. Nonspecific finding could represent pulmonary edema or may be   infectious/inflammatory.    Dilated pulmonary arteries in keeping with patient's known pulmonary   hypertension.    < end of copied text >    < from: POCUS ED TTE 2D F/U, Limited w/o Cont. (10.14.24 @ 04:28) >  IMPRESSION:  Trace pericardial effusion with no sonographic signs of tamponade.  Severe right ventricular dilation.    < end of copied text >      < from: TTE W or WO Ultrasound Enhancing Agent (10.14.24 @ 17:00) >  CONCLUSIONS:      1. Technically difficult image quality.   2. Left ventricular cavity is small. The interventricular septum is flattened in systole and diastole consistent with right ventricular pressure and volume overload. Left ventricular systolic function is hyperdynamic with an ejection fraction visually estimated at > 70 %. There is poor visualization of the endocardial borders to determine the presence of wall motion abnormalities.   3. The right ventricle is not well visualized. enlarged right ventricular cavity size and reduced right ventricular systolic function.   4. Mild to moderate pulmonic regurgitation.   5. Pulmonary artery systolic pressure could not be estimated.   6. Compared to the transthoracic echocardiogram performed on 7/16/2024, the right ventricle was not as well visualized on this study. There is again echocardiographic evidence of right ventricular failure. Quantification of severity is limited.    < end of copied text >     Internal Medicine Progress Note    Pt is a 44 year old woman with a history of pHTN (group I and IV on Remodulin and Xarelto, on home 2L O2), c/b R heart failure, history of RA thrombus s/p thrombectomy, history of DVT/PE (2016), multiple abdominal wall abscesses (s/p I&D), asthma, bradycardia s/p dual chamber PPM. She presents with worsening palpitations and SOB on exertion.      OVERNIGHT EVENTS/INTERVAL HPI: S/p RHC/LHC yesterday.  Notes palpitations have improved since admission.      OBJECTIVE:  ICU Vital Signs Last 24 Hrs  T(C): 36.7 (18 Oct 2024 05:00), Max: 36.7 (18 Oct 2024 05:00)  T(F): 98 (18 Oct 2024 05:00), Max: 98 (18 Oct 2024 05:00)  HR: 105 (18 Oct 2024 05:00) (81 - 113)  BP: 103/67 (18 Oct 2024 05:00) (103/67 - 119/81)  BP(mean): 94 (17 Oct 2024 17:09) (94 - 95)  ABP: --  ABP(mean): --  RR: 18 (18 Oct 2024 05:00) (18 - 19)  SpO2: 95% (18 Oct 2024 05:00) (92% - 97%)    O2 Parameters below as of 18 Oct 2024 05:00  Patient On (Oxygen Delivery Method): nasal cannula  O2 Flow (L/min): 2      I&O's Detail    17 Oct 2024 07:01  -  18 Oct 2024 07:00  --------------------------------------------------------  IN:    freetext medication - Infusion: 1.2 mL    Heparin Infusion: 291 mL    Oral Fluid: 480 mL  Total IN: 772.2 mL    OUT:    Voided (mL): 1500 mL  Total OUT: 1500 mL    Total NET: -727.8 mL      18 Oct 2024 07:01  -  18 Oct 2024 08:58  --------------------------------------------------------  IN:    freetext medication - Infusion: 0.1 mL    Heparin Infusion: 26 mL  Total IN: 26.1 mL    OUT:    Oral Fluid: 0 mL    Voided (mL): 900 mL  Total OUT: 900 mL    Total NET: -873.9 mL          Physical Exam:  GENERAL: Awake, alert and interactive, appears comfortable  NEURO: A&Ox4, no focal deficits, 5/5 strength in all ext, reflexes 2+ throughout, CN 2-12 intact  HEENT: Normocephalic, atraumatic, no conjunctivitis or scleral icterus, oral mucosa moist, no oral lesions noted  NECK: Supple, no LAD, no JVD, thyroid not palpable  CARDIAC: Tachycardic rate, Regular rhythm, +S1/S2, no murmurs/rubs/gallops  PULM: +Increased work of breath, clear to auscultation bilaterally, +Expiratory wheeze bilaterally, No rales/rhonchi  ABDOMEN: Soft, +epigastric tenderness to deep palpation, nondistended, +bs, no hepatosplenomegaly, no rebound tenderness or fluid wave, no CVA tenderness  MSK: Range of motion grossly intact, no back tenderness  SKIN: Warm and dry, no rashes, lesions  VASC: Peripheral pulses, no edema, no LE tenderness  Psych: Appropriate affect      MEDICATIONS  (STANDING):  buDESOnide    Inhalation Suspension 0.5 milliGRAM(s) Inhalation two times a day  influenza   Vaccine 0.5 milliLiter(s) IntraMuscular once  ipratropium    for Nebulization 500 MICROGram(s) Nebulizer every 6 hours  montelukast 10 milliGRAM(s) Oral daily  pantoprazole    Tablet 40 milliGRAM(s) Oral before breakfast  predniSONE   Tablet 10 milliGRAM(s) Oral daily  rivaroxaban 20 milliGRAM(s) Oral with dinner  sildenafil (REVATIO) 20 milliGRAM(s) Oral <User Schedule>  spironolactone 50 milliGRAM(s) Oral daily  sucralfate 1 Gram(s) Oral two times a day  Treprostinil  SubQ continuous infusion 48 NANOGram(s)/kG/Min 0.052 mL/Hr (0.05 mL/Hr) IV Continuous <Continuous>    MEDICATIONS  (PRN):  acetaminophen     Tablet .. 650 milliGRAM(s) Oral every 6 hours PRN Temp greater or equal to 38C (100.4F), Mild Pain (1 - 3)  aluminum hydroxide/magnesium hydroxide/simethicone Suspension 30 milliLiter(s) Oral every 4 hours PRN Dyspepsia        Labs:                                              15.1   14.81 )-----------( 398      ( 18 Oct 2024 06:24 )             50.0     10-18    132[L]  |  97  |  19  ----------------------------<  85  5.6[H]   |  21[L]  |  0.95    Ca    9.0      18 Oct 2024 06:24  Phos  4.0     10-18  Mg     2.1     10-18    TPro  8.0  /  Alb  4.1  /  TBili  0.8  /  DBili  x   /  AST  43[H]  /  ALT  17  /  AlkPhos  66  10-18        Radiology:   < from: CT Angio Chest PE Protocol w/ IV Cont (10.14.24 @ 01:08) >  Mixing artifact in the main pulmonary arteries degrading evaluation. No   evidence of acute pulmonary embolism within the confines of this exam.    Patchy groundglass opacities in both lungs similar to that seen on prior   exam. Nonspecific finding could represent pulmonary edema or may be   infectious/inflammatory.    Dilated pulmonary arteries in keeping with patient's known pulmonary   hypertension.    < end of copied text >    < from: POCUS ED TTE 2D F/U, Limited w/o Cont. (10.14.24 @ 04:28) >  IMPRESSION:  Trace pericardial effusion with no sonographic signs of tamponade.  Severe right ventricular dilation.    < end of copied text >      < from: TTE W or WO Ultrasound Enhancing Agent (10.14.24 @ 17:00) >  CONCLUSIONS:      1. Technically difficult image quality.   2. Left ventricular cavity is small. The interventricular septum is flattened in systole and diastole consistent with right ventricular pressure and volume overload. Left ventricular systolic function is hyperdynamic with an ejection fraction visually estimated at > 70 %. There is poor visualization of the endocardial borders to determine the presence of wall motion abnormalities.   3. The right ventricle is not well visualized. enlarged right ventricular cavity size and reduced right ventricular systolic function.   4. Mild to moderate pulmonic regurgitation.   5. Pulmonary artery systolic pressure could not be estimated.   6. Compared to the transthoracic echocardiogram performed on 7/16/2024, the right ventricle was not as well visualized on this study. There is again echocardiographic evidence of right ventricular failure. Quantification of severity is limited.    < end of copied text >

## 2024-10-19 NOTE — PROGRESS NOTE ADULT - ASSESSMENT
44 year old woman with a history of pHTN (group I and IV on Rimodulin and Xarelto, on home 2L O2), c/b R heart failure, history of RA thrombus s/p thrombectomy, history of DVT/PE (2016), multiple abdominal wall abscesses (s/p I&D), asthma, bradycardia s/p dual chamber PPM. She presents with worsening palpitations and SOB on exertion. Labs show elevated pro-BNP at 1861, which increased from 840 in July 2024. Pt chest ct unchanged from July 2024. Concerning for pHTN exacerbation complicated by right heart failure. RHC and LHC scheduled for 10/18.

## 2024-10-19 NOTE — PROGRESS NOTE ADULT - ATTENDING COMMENTS
44F PMH pHTN (group I and IV on Rimodulin and Xarelto, on home 2L O2), c/b R heart failure, history of RA thrombus s/p thrombectomy, history of DVT/PE (2016), multiple abdominal wall abscesses (s/p I&D), asthma, bradycardia s/p dual chamber PPM presenting with SOUZA, palpitations, and epigastric pain.   1.Hypoxemic respiratory failure/pulmonary HTN  -PO bumex 1 mg qd.  -S/p L/RHC   -Continue Remodulin and Revatio  -C/w home O2 2L  -Cardiology follow up pending today   2.Leukocytosis/ steroid induced   -monitor off abx   -infectious workup negative

## 2024-10-19 NOTE — PROGRESS NOTE ADULT - PROBLEM SELECTOR PLAN 6
WBC 13 on admission, now increasing to 14.8. Likely due to chronic steroid use, infectious etiology less likely.  - CT (10/13): patchy GGO unchanged from prior  - afebrile, tachycardic   - UA negative  - No localizing symptoms besides SOB which may be attributable to pHTN; No fever/cough  - Full RVP negative  - BCx NGTD    Plan:  - Will monitor off abx for now, low threshold to start

## 2024-10-19 NOTE — PROGRESS NOTE ADULT - PROBLEM SELECTOR PLAN 1
c/w Remodulin 51 ng/kg/min  c/w sildenafil 20 mg q8h  trial of Procardia 30 mg daily (hold for SBP <90)  c/w bumex 1mg daily  c/w jacqueline 50 mg twice/day  will d/w pulm transplant team re: candidacy

## 2024-10-19 NOTE — PROGRESS NOTE ADULT - ASSESSMENT
Briefly, 44F h/o PE on Xarelto, pulmonary HTN (likely group I/III; on remodulin/sildenafil), recurrent absceses at site of remodulin, ILD (on home O2), JULIA, obesity, prior AVNRT s/p ablation (2020), bradycardia s/p dual chamber PPM, prior RA clot s/p aspiration (2022) who presented on 10/14 with palpitations and SOB. Since arrival, received IV bumex for concern of RV overload. CTA done with dilated PA but negative for PE and patchy b/l GGO. PPM interrogated without events noted. TTE 10/14 obtained with nl LV function, mod-severe RV dysfunction, RV pressure/volume overload, mod TR, PASP approx 40-50, LVOT VTI 19 cm, IVC 1.4 cm. Was being evaluated for lung transplant but recommended to lose weight first. Currently WHO functional class III with group I/IV PH and appears euvolemic. Unclear etiology of palpitations but possibly d/t underlying disorder as has sinus tach; no events on tele. Repeat RHC with + vasoreactive test    Pertinent studies:  10/18/24 - RHC - RA 3, RV 74/7, PA 74/43/55, PCWP 9, CO/CI 3.8/1.88, PVR 11.9; Marcela PA 56/33/43, PCWP 8, CO/CI 5.1/2.53, PVR 6.75  10/14/24 TTE - nl EF, mod TR, mod-severe RV dysfunction, LVOT VTI 19 cm, IVC 1.4 cm  10/14/24 - CTA - no PE; dilated PA; b/l patchy GGO  7/17/24 - ECG - sinus, biatrial enlargement, RVH, R axis deviation, RV strain  8/16/23 - RA 3, RV 81/3, PA 79/43/57, PCWP 36, LVEDP not obtained; CO/CI 4.3/2.01  7/25/14 - mRCA 40%; /85, RA 13, /27, /52/77, LVEDP 10; Marcela PA 58/30/42 with improvement CI from 1.54 to 2.33  6/2/15 - V/Q scan - low probability of PE

## 2024-10-19 NOTE — PROGRESS NOTE ADULT - PROBLEM SELECTOR PROBLEM 8
I informed Nikia that she had one elevated glucose level but she is diagnostically not a gestational diabetic. Per Stephanie Giordano, due to her history of elevated glucose levels, she likely has a glucose intolerance and should monitor her carbohydrate intake. I offered Nikia a diabetic educator consult for education. Pt began yelling stating that it is because she is pregnant that it was elevated and she never drinks sugar drinks, \" I know how my body handles sugar\". I apologized that the patient is upset. She states that she feels like she had no patient right to refuse the test because she would then have to follow with a physician and states \"that would be a punishment\". I attempted to clarify information to Nikia but she began yelling over me and was not receptive to education of the glucose test and why she would need to follow with a physician. Patient continued to yell about this test. I asked if she had any further questions or concerns, she states none at this time.    Asthma

## 2024-10-19 NOTE — PROGRESS NOTE ADULT - PROBLEM SELECTOR PLAN 9
History of PE. RA clot s/p aspiration 2022    Plan:  -C/w Xarelto; switch to hep gtt for plan for cath

## 2024-10-19 NOTE — PROGRESS NOTE ADULT - PROBLEM SELECTOR PLAN 10
Diet: Regular  DVT ppx: Xarelto 20mg QD, switch to heparin gtt for RHC  Disposition: Pending course  Code: Full code    Fluticasone and antihistamines for allergies Diet: Regular  DVT ppx: Xarelto 20mg QD  Disposition: Pending course  Code: Full code    Fluticasone and antihistamines for allergies/nasal congestion

## 2024-10-19 NOTE — PROGRESS NOTE ADULT - PROBLEM SELECTOR PLAN 1
-History of group 1 + 4 pHTN on Sildenafil and Remodulin   -pro-BNP elevated at 1861  -CXR with pulm edema  -TTE on 10/14 shows enlarged right ventricular cavity size and reduced right ventricular systolic function with evidence of right ventricular failure    Plan:  - Pulm recs appreciated  - C/w Sildenafil 20mg TID  - Increase Remodulin from 49ng to 51ng  - S/p Bumex 1mg IV BID - transitioned to bumex 1mg PO daily as pt euvolemic  - C/w Aldactone 50mg - switched to BID per HF  - Pulm recommends decreasing prednisone 10 to 5mg daily  - RHC/LHC planned for 10/18/24  - Continue O2 therapy with NC, baseline 2 L  - Encourage ambulation as tolerated -History of group 1 + 4 pHTN on Sildenafil and Remodulin   -pro-BNP elevated at 1861  -CXR with pulm edema  -TTE on 10/14 shows enlarged right ventricular cavity size and reduced right ventricular systolic function with evidence of right ventricular failure  -S/p RHC/LHC 10/18    Plan:  - Pulm recs appreciated  - C/w Sildenafil 20mg TID  - Increase Remodulin from 49ng to 51ng  - S/p Bumex 1mg IV BID - transitioned to bumex 1mg PO daily as pt euvolemic  - C/w Aldactone 50mg - switched to BID per HF  - Pulm recommends decreasing prednisone 10 to 5mg daily  - Continue O2 therapy with NC, baseline 2 L  - Encourage ambulation as tolerated

## 2024-10-19 NOTE — PROGRESS NOTE ADULT - PROBLEM SELECTOR PLAN 2
-History of RV failure iso pHTN, presented with worsening SOUZA, on exam with JVD  - pro-BNP elevated at 1861  - Home regimen: Aldactone 25mg QD, Torsemide 20mg QD  - s/p Torsemide 20mg PO + 1mg Bumex (10/14) in ED.   -  TTE on 10/14 shows enlarged right ventricular cavity size and reduced right ventricular systolic function with evidence of right ventricular failure. EF 70%.   - Standing weight on 10/14: 97.6 kg    Plan:  - Consulted Dr. Chavis (outpatient HF) and EP  - S/p Bumex 1mg IV BID - bumex 1mg PO daily  - Consider transitioning back to home torsemide 20mg  - C/w Aldactone 50mg - switched to 50mg BID per HF  - RHC/LHC planned for 10/18/24  - Strict I&O, daily standing weight  - Monitor BUN/Cr -History of RV failure iso pHTN, presented with worsening SOUZA, on exam with JVD  - pro-BNP elevated at 1861  - Home regimen: Aldactone 25mg QD, Torsemide 20mg QD  - s/p Torsemide 20mg PO + 1mg Bumex (10/14) in ED.   -  TTE on 10/14 shows enlarged right ventricular cavity size and reduced right ventricular systolic function with evidence of right ventricular failure. EF 70%.   - Standing weight on 10/14: 97.6 kg  - S/p RHC/LHC 10/18    Plan:  - Consulted Dr. Chavis (outpatient HF)  - S/p Bumex 1mg IV BID - bumex 1mg PO daily  - Consider transitioning back to home torsemide 20mg  - C/w Aldactone 50mg - switched to 50mg BID per HF  - Strict I&O, daily standing weight  - Monitor BUN/Cr

## 2024-10-19 NOTE — PROGRESS NOTE ADULT - PROBLEM SELECTOR PLAN 5
Chronic Bradycardia s/p PPM. Presenting with palpitations.  Thyroid studies wnl.    Plan:  - F/u TSH  - S/p PPM interrogation on 10/14 - showed SVT  - F/u EP for possible change in monitor zone  - Telemetry monitoring Chronic Bradycardia s/p PPM. Presenting with palpitations.  Thyroid studies wnl.    Plan:  - S/p PPM interrogation on 10/14 - showed SVT  - F/u EP for possible change in monitor zone  - Telemetry monitoring

## 2024-10-19 NOTE — PROGRESS NOTE ADULT - ASSESSMENT
45 yo F w/ PMHx PHTN (group I and IV) currently on remodulin and xarelto, chronic hypoxemic respiratory failure on 2L home O2, RA thrombus s/p thrombectomy, Asthma (Pred 10mg), prior AVRNT s/p ablation, bradycardia s/p dual chamber PPM, and hx of abdominal wall abscesses presenting for increased dyspnea and palpitations with HR into 150s. CTPA negative for PE but showing bilateral GGOs. ED POCUS with IVC >2cm, resp variation noted, severe RV dilation. BNP elevated to 1800, prev 800 on recent admission.  No wheezing on exam.  Overall findings consistent with HF exacerbation. Echo with RV dilation, mildly reduced RV function. EP investigated PPM but no arrhythmias noted?    Recommend:  - PLEASE DISCONTINUE PSEUDOEPHEDRINE, alpha 1 agonists are not an ideal medication in PH patients  - can consider nasal antihistamine in addition to Flonase or increasing Flonase dosing if concern for nasal congestion  - Continue Remodulin, increased 10/15/24 to 51ng/kg/min (based on 92kg weight), currently running at 0.056ml/hr  - Continue Sildenafil 20mg TID (brought in home Sildenafil which will need pharmacy approval)  - please continue with Ipratropium neb q6h  - continue Budesonide 0.5mg BID  - Bumex 1mg PO daily  - daily standing weights, strict Is/Os, monitor BUN/Cr  - Aldactone at 50mg BID now  - supplemental O2 as needed for sat >90, currently on 2L NC  - Prednisone DECREASED to 5mg  - holding Xarelto, on Heparin gtt in anticipation of RHC/LHC today  - EP does not plan to change HR sensitivity threshold on PPM d/t too many prior episodes of SVT  - Awaiting RHC/LHC RESULTS     45 yo F w/ PMHx PHTN (group I and IV) currently on remodulin and xarelto, chronic hypoxemic respiratory failure on 2L home O2, RA thrombus s/p thrombectomy, Asthma (Pred 10mg), prior AVRNT s/p ablation, bradycardia s/p dual chamber PPM, and hx of abdominal wall abscesses presenting for increased dyspnea and palpitations with HR into 150s. CTPA negative for PE but showing bilateral GGOs. ED POCUS with IVC >2cm, resp variation noted, severe RV dilation. BNP elevated to 1800, prev 800 on recent admission.  No wheezing on exam.  Overall findings consistent with HF exacerbation. Echo with RV dilation, mildly reduced RV function. EP investigated PPM but no arrhythmias noted?  RHC/LHC findings noted, notably positive NO response with significant improvement in CI/CO, mPAP 56 to 43, reduced PVR from 11.2 MONACO to 6.3 MONACO. RA 3 and PCWP 9 c/w euvolemia.    Recommend:  - d/w Dr Chavis (HF) and Dr. Yoon that will trial addition of Procardia 30mg daily  - PLEASE DISCONTINUE PSEUDOEPHEDRINE, alpha 1 agonists are not an ideal medication in PH patients  - can consider nasal antihistamine in addition to Flonase or increasing Flonase dosing if concern for nasal congestion  - Continue Remodulin, increased 10/15/24 to 51ng/kg/min (based on 92kg weight), currently running at 0.056ml/hr  - Continue Sildenafil 20mg TID (brought in home Sildenafil which will need pharmacy approval)  - please continue with Ipratropium neb q6h  - continue Budesonide 0.5mg BID  - Bumex 1mg PO daily  - daily standing weights, strict Is/Os, monitor BUN/Cr  - Aldactone at 50mg BID now  - supplemental O2 as needed for sat >90, currently on 2L NC  - Prednisone DECREASED to 5mg  - holding Xarelto, on Heparin gtt in anticipation of RHC/LHC today  - EP does not plan to change HR sensitivity threshold on PPM d/t too many prior episodes of SVT

## 2024-10-19 NOTE — PROGRESS NOTE ADULT - SUBJECTIVE AND OBJECTIVE BOX
Interval History:  feels well  denies complaints      Medications:  acetaminophen     Tablet .. 650 milliGRAM(s) Oral every 6 hours PRN  acetaminophen 325 mG/butalbital 50 mG/caffeine 40 mG 1 Tablet(s) Oral every 8 hours PRN  aluminum hydroxide/magnesium hydroxide/simethicone Suspension 30 milliLiter(s) Oral every 4 hours PRN  buDESOnide    Inhalation Suspension 0.5 milliGRAM(s) Inhalation two times a day  buMETAnide 1 milliGRAM(s) Oral daily  fluticasone propionate 50 MICROgram(s)/spray Nasal Spray 1 Spray(s) Both Nostrils two times a day  hydrocortisone 1% Ointment 1 Application(s) Topical two times a day PRN  HYDROmorphone   Tablet 2 milliGRAM(s) Oral daily PRN  influenza   Vaccine 0.5 milliLiter(s) IntraMuscular once  ipratropium    for Nebulization 500 MICROGram(s) Nebulizer every 6 hours  montelukast 10 milliGRAM(s) Oral daily  pantoprazole    Tablet 40 milliGRAM(s) Oral before breakfast  predniSONE   Tablet 5 milliGRAM(s) Oral daily  rivaroxaban 20 milliGRAM(s) Oral daily  sildenafil (REVATIO) 20 milliGRAM(s) Oral <User Schedule>  spironolactone 50 milliGRAM(s) Oral two times a day  SUMAtriptan 50 milliGRAM(s) Oral every 12 hours PRN  Treprostinil SubQ Continous Infusion 51 NANOGram(s)/kG/Min 0.056 mL/Hr IV Continuous <Continuous>      Vitals:  T(C): 36.8 (10-19-24 @ 13:18), Max: 36.8 (10-18-24 @ 20:45)  HR: 100 (10-19-24 @ 13:18) (89 - 112)  BP: 105/61 (10-19-24 @ 13:18) (99/67 - 120/70)  BP(mean): 78 (10-18-24 @ 21:20) (78 - 89)  RR: 18 (10-19-24 @ 13:18) (16 - 18)  SpO2: 98% (10-19-24 @ 13:18) (94% - 99%)    Daily     Daily Weight in k.6 (19 Oct 2024 06:30)        I&O's Summary    18 Oct 2024 07:01  -  19 Oct 2024 07:00  --------------------------------------------------------  IN: 576.8 mL / OUT: 1150 mL / NET: -573.2 mL    19 Oct 2024 07:01  -  19 Oct 2024 15:05  --------------------------------------------------------  IN: 400.4 mL / OUT: 400 mL / NET: 0.4 mL        Physical Exam:  Appearance: No Acute Distress  HEENT: PERRL  Neck: No JVD  Cardiovascular: Normal S1 S2, No murmurs/rubs/gallops  Respiratory: Clear to auscultation bilaterally  Gastrointestinal: Soft, Non-tender	  Skin: No cyanosis	  Neurologic: Non-focal  Extremities: No LE edema  Psychiatry: A & O x 3, Mood & affect appropriate    Labs:                        13.8   14.83 )-----------( 323      ( 19 Oct 2024 09:44 )             44.7     10-19    133[L]  |  96  |  15  ----------------------------<  130[H]  4.1   |  21[L]  |  1.03    Ca    8.7      19 Oct 2024 09:45  Phos  3.2     10-19  Mg     1.9     10-19    TPro  8.0  /  Alb  4.1  /  TBili  0.8  /  DBili  x   /  AST  43[H]  /  ALT  17  /  AlkPhos  66  10-18    PT/INR - ( 18 Oct 2024 06:24 )   PT: 11.8 sec;   INR: 1.03 ratio         PTT - ( 19 Oct 2024 09:45 )  PTT:37.0 sec        TELEMETRY:    Echocardiogram:

## 2024-10-20 LAB
ALBUMIN SERPL ELPH-MCNC: 4.3 G/DL — SIGNIFICANT CHANGE UP (ref 3.3–5)
ALP SERPL-CCNC: 63 U/L — SIGNIFICANT CHANGE UP (ref 40–120)
ALT FLD-CCNC: 10 U/L — SIGNIFICANT CHANGE UP (ref 10–45)
ANION GAP SERPL CALC-SCNC: 12 MMOL/L — SIGNIFICANT CHANGE UP (ref 5–17)
APTT BLD: 41.9 SEC — HIGH (ref 24.5–35.6)
AST SERPL-CCNC: 11 U/L — SIGNIFICANT CHANGE UP (ref 10–40)
BASOPHILS # BLD AUTO: 0.07 K/UL — SIGNIFICANT CHANGE UP (ref 0–0.2)
BASOPHILS NFR BLD AUTO: 0.6 % — SIGNIFICANT CHANGE UP (ref 0–2)
BILIRUB SERPL-MCNC: 0.8 MG/DL — SIGNIFICANT CHANGE UP (ref 0.2–1.2)
BUN SERPL-MCNC: 16 MG/DL — SIGNIFICANT CHANGE UP (ref 7–23)
CALCIUM SERPL-MCNC: 8.9 MG/DL — SIGNIFICANT CHANGE UP (ref 8.4–10.5)
CHLORIDE SERPL-SCNC: 96 MMOL/L — SIGNIFICANT CHANGE UP (ref 96–108)
CO2 SERPL-SCNC: 25 MMOL/L — SIGNIFICANT CHANGE UP (ref 22–31)
CREAT SERPL-MCNC: 1.23 MG/DL — SIGNIFICANT CHANGE UP (ref 0.5–1.3)
EGFR: 56 ML/MIN/1.73M2 — LOW
EOSINOPHIL # BLD AUTO: 0.13 K/UL — SIGNIFICANT CHANGE UP (ref 0–0.5)
EOSINOPHIL NFR BLD AUTO: 1.2 % — SIGNIFICANT CHANGE UP (ref 0–6)
GLUCOSE SERPL-MCNC: 119 MG/DL — HIGH (ref 70–99)
HCT VFR BLD CALC: 45.3 % — HIGH (ref 34.5–45)
HGB BLD-MCNC: 14.3 G/DL — SIGNIFICANT CHANGE UP (ref 11.5–15.5)
IMM GRANULOCYTES NFR BLD AUTO: 0.4 % — SIGNIFICANT CHANGE UP (ref 0–0.9)
INR BLD: 1.36 RATIO — HIGH (ref 0.85–1.16)
LYMPHOCYTES # BLD AUTO: 1.09 K/UL — SIGNIFICANT CHANGE UP (ref 1–3.3)
LYMPHOCYTES # BLD AUTO: 9.7 % — LOW (ref 13–44)
MAGNESIUM SERPL-MCNC: 1.9 MG/DL — SIGNIFICANT CHANGE UP (ref 1.6–2.6)
MCHC RBC-ENTMCNC: 22.1 PG — LOW (ref 27–34)
MCHC RBC-ENTMCNC: 31.6 GM/DL — LOW (ref 32–36)
MCV RBC AUTO: 70 FL — LOW (ref 80–100)
MONOCYTES # BLD AUTO: 0.62 K/UL — SIGNIFICANT CHANGE UP (ref 0–0.9)
MONOCYTES NFR BLD AUTO: 5.5 % — SIGNIFICANT CHANGE UP (ref 2–14)
NEUTROPHILS # BLD AUTO: 9.29 K/UL — HIGH (ref 1.8–7.4)
NEUTROPHILS NFR BLD AUTO: 82.6 % — HIGH (ref 43–77)
NRBC # BLD: 0 /100 WBCS — SIGNIFICANT CHANGE UP (ref 0–0)
PHOSPHATE SERPL-MCNC: 2.9 MG/DL — SIGNIFICANT CHANGE UP (ref 2.5–4.5)
PLATELET # BLD AUTO: 316 K/UL — SIGNIFICANT CHANGE UP (ref 150–400)
POTASSIUM SERPL-MCNC: 4.1 MMOL/L — SIGNIFICANT CHANGE UP (ref 3.5–5.3)
POTASSIUM SERPL-SCNC: 4.1 MMOL/L — SIGNIFICANT CHANGE UP (ref 3.5–5.3)
PROT SERPL-MCNC: 7.6 G/DL — SIGNIFICANT CHANGE UP (ref 6–8.3)
PROTHROM AB SERPL-ACNC: 15.5 SEC — HIGH (ref 9.9–13.4)
RBC # BLD: 6.47 M/UL — HIGH (ref 3.8–5.2)
RBC # FLD: 19.5 % — HIGH (ref 10.3–14.5)
SODIUM SERPL-SCNC: 133 MMOL/L — LOW (ref 135–145)
WBC # BLD: 11.24 K/UL — HIGH (ref 3.8–10.5)
WBC # FLD AUTO: 11.24 K/UL — HIGH (ref 3.8–10.5)

## 2024-10-20 PROCEDURE — 99233 SBSQ HOSP IP/OBS HIGH 50: CPT | Mod: GC

## 2024-10-20 RX ADMIN — Medication 5 MILLIGRAM(S): at 05:42

## 2024-10-20 RX ADMIN — Medication 50 MILLIGRAM(S): at 16:47

## 2024-10-20 RX ADMIN — Medication 1 MILLIGRAM(S): at 05:42

## 2024-10-20 RX ADMIN — RIVAROXABAN 20 MILLIGRAM(S): 20 TABLET, FILM COATED ORAL at 13:46

## 2024-10-20 RX ADMIN — FLUTICASONE PROPIONATE 1 SPRAY(S): 50 SPRAY, METERED NASAL at 05:42

## 2024-10-20 RX ADMIN — IPRATROPIUM BROMIDE 500 MICROGRAM(S): 0.5 SOLUTION RESPIRATORY (INHALATION) at 13:46

## 2024-10-20 RX ADMIN — Medication 2 MILLIGRAM(S): at 06:05

## 2024-10-20 RX ADMIN — BUDESONIDE 0.5 MILLIGRAM(S): 3 CAPSULE ORAL at 16:47

## 2024-10-20 RX ADMIN — Medication 30 MILLIGRAM(S): at 17:59

## 2024-10-20 RX ADMIN — PANTOPRAZOLE SODIUM 40 MILLIGRAM(S): 40 TABLET, DELAYED RELEASE ORAL at 05:43

## 2024-10-20 RX ADMIN — Medication 2 MILLIGRAM(S): at 05:48

## 2024-10-20 RX ADMIN — IPRATROPIUM BROMIDE 500 MICROGRAM(S): 0.5 SOLUTION RESPIRATORY (INHALATION) at 05:41

## 2024-10-20 RX ADMIN — BUDESONIDE 0.5 MILLIGRAM(S): 3 CAPSULE ORAL at 05:41

## 2024-10-20 RX ADMIN — MONTELUKAST SODIUM 10 MILLIGRAM(S): 10 TABLET, FILM COATED ORAL at 13:45

## 2024-10-20 RX ADMIN — Medication 50 MILLIGRAM(S): at 05:42

## 2024-10-20 RX ADMIN — Medication 20 MILLIGRAM(S): at 06:05

## 2024-10-20 RX ADMIN — Medication 20 MILLIGRAM(S): at 22:07

## 2024-10-20 RX ADMIN — Medication 20 MILLIGRAM(S): at 14:00

## 2024-10-20 NOTE — PROGRESS NOTE ADULT - SUBJECTIVE AND OBJECTIVE BOX
***************************************************************  Margo Sainz, PGY-3  Internal Medicine   ***************************************************************    ROXI MARIA  MRN: [id number]    Patient is a 44y old Female who presents with a chief complaint of palpitation, worsening SOUZA and chest tightness (19 Oct 2024 15:04)    Subjective: No acute events overnight.      MEDICATIONS  (STANDING):  buDESOnide    Inhalation Suspension 0.5 milliGRAM(s) Inhalation two times a day  buMETAnide 1 milliGRAM(s) Oral daily  fluticasone propionate 50 MICROgram(s)/spray Nasal Spray 1 Spray(s) Both Nostrils two times a day  influenza   Vaccine 0.5 milliLiter(s) IntraMuscular once  ipratropium    for Nebulization 500 MICROGram(s) Nebulizer every 6 hours  montelukast 10 milliGRAM(s) Oral daily  NIFEdipine XL 30 milliGRAM(s) Oral daily  pantoprazole    Tablet 40 milliGRAM(s) Oral before breakfast  predniSONE   Tablet 5 milliGRAM(s) Oral daily  rivaroxaban 20 milliGRAM(s) Oral daily  sildenafil (REVATIO) 20 milliGRAM(s) Oral <User Schedule>  spironolactone 50 milliGRAM(s) Oral two times a day  Treprostinil SubQ Continous Infusion 51 NANOGram(s)/kG/Min 0.056 mL/Hr (0.06 mL/Hr) IV Continuous <Continuous>    MEDICATIONS  (PRN):  acetaminophen     Tablet .. 650 milliGRAM(s) Oral every 6 hours PRN Temp greater or equal to 38C (100.4F), Mild Pain (1 - 3)  acetaminophen 325 mG/butalbital 50 mG/caffeine 40 mG 1 Tablet(s) Oral every 8 hours PRN Mild Pain (1 - 3)  aluminum hydroxide/magnesium hydroxide/simethicone Suspension 30 milliLiter(s) Oral every 4 hours PRN Dyspepsia  hydrocortisone 1% Ointment 1 Application(s) Topical two times a day PRN Itching  HYDROmorphone   Tablet 2 milliGRAM(s) Oral daily PRN Moderate Pain (4 - 6)  SUMAtriptan 50 milliGRAM(s) Oral every 12 hours PRN Moderate to Severe Headache    Objective:  Vitals: Vital Signs Last 24 Hrs  T(C): 36.7 (10-20-24 @ 04:33), Max: 36.8 (10-19-24 @ 13:18)  T(F): 98.1 (10-20-24 @ 04:33), Max: 98.2 (10-19-24 @ 13:18)  HR: 107 (10-20-24 @ 04:33) (99 - 109)  BP: 109/73 (10-20-24 @ 04:33) (105/61 - 119/82)  BP(mean): 82 (10-19-24 @ 20:34) (82 - 82)  RR: 18 (10-20-24 @ 04:33) (18 - 18)  SpO2: 98% (10-20-24 @ 04:33) (96% - 98%)               I&O's Summary    19 Oct 2024 07:01  -  20 Oct 2024 07:00  --------------------------------------------------------  IN: 600.5 mL / OUT: 1550 mL / NET: -949.5 mL    PHYSICAL EXAM:  General: NAD, resting in bed, on RA  Lungs: clear to auscultation in anterior lung fields, no wheezes  Heart: +s1/s2, RRR, no murmurs/gallops/rubs  Abdomen: soft, non-distended, non-tender to palpation  Extremities: +DP pulse bilaterally, no cyanosis/clubbing/edema      LABS:  10-19    133[L]  |  96  |  15  ----------------------------<  130[H]  4.1   |  21[L]  |  1.03  10-18    132[L]  |  97  |  19  ----------------------------<  85  5.6[H]   |  21[L]  |  0.95  10-17    133[L]  |  96  |  16  ----------------------------<  143[H]  3.6   |  23  |  1.10    Ca    8.7      19 Oct 2024 09:45  Ca    9.0      18 Oct 2024 06:24  Ca    8.8      17 Oct 2024 09:27  Phos  3.2     10-19  Mg     1.9     10-19    TPro  8.0  /  Alb  4.1  /  TBili  0.8  /  DBili  x   /  AST  43[H]  /  ALT  17  /  AlkPhos  66  10-18    PTT - ( 19 Oct 2024 09:45 )  PTT:37.0 sec              Urinalysis Basic - ( 19 Oct 2024 09:45 )    Color: x / Appearance: x / SG: x / pH: x  Gluc: 130 mg/dL / Ketone: x  / Bili: x / Urobili: x   Blood: x / Protein: x / Nitrite: x   Leuk Esterase: x / RBC: x / WBC x   Sq Epi: x / Non Sq Epi: x / Bacteria: x    ABG - ( 18 Oct 2024 19:40 )  pH, Arterial: x     pH, Blood: x     /  pCO2: x     /  pO2: x     / HCO3: x     / Base Excess: x     /  SaO2: 99.2                   13.8   14.83 )-----------( 323      ( 19 Oct 2024 09:44 )             44.7                         15.1   14.81 )-----------( 398      ( 18 Oct 2024 06:24 )             50.0                         14.2   13.72 )-----------( 412      ( 17 Oct 2024 14:16 )             46.0     RADIOLOGY & ADDITIONAL TESTS:    Imaging Personally Reviewed:  [x] YES  [ ] NO    Consultants involved in case:   Consultant(s) Notes Reviewed:  [x] YES  [ ] NO:   Care Discussed with Consultants/Other Providers [x] YES  [ ] NO ***************************************************************  Margo Sainz, PGY-3  Internal Medicine   ***************************************************************    ROXI MARIA  MRN: [id number]    Patient is a 44y old Female who presents with a chief complaint of palpitation, worsening SOUZA and chest tightness (19 Oct 2024 15:04)    Subjective: No acute events overnight. Reports seeing small amount of blood clots/dark blood when wiping s/p BM today. Appeared to be vaginal bleed per patient, although not due for periods yet. Also had minor nose bleeds, now has humidifier via NC. Ambulating to bathroom yesterday, no further epigastric pain. No headaches s/p switching to home sildenafil.       MEDICATIONS  (STANDING):  buDESOnide    Inhalation Suspension 0.5 milliGRAM(s) Inhalation two times a day  buMETAnide 1 milliGRAM(s) Oral daily  fluticasone propionate 50 MICROgram(s)/spray Nasal Spray 1 Spray(s) Both Nostrils two times a day  influenza   Vaccine 0.5 milliLiter(s) IntraMuscular once  ipratropium    for Nebulization 500 MICROGram(s) Nebulizer every 6 hours  montelukast 10 milliGRAM(s) Oral daily  NIFEdipine XL 30 milliGRAM(s) Oral daily  pantoprazole    Tablet 40 milliGRAM(s) Oral before breakfast  predniSONE   Tablet 5 milliGRAM(s) Oral daily  rivaroxaban 20 milliGRAM(s) Oral daily  sildenafil (REVATIO) 20 milliGRAM(s) Oral <User Schedule>  spironolactone 50 milliGRAM(s) Oral two times a day  Treprostinil SubQ Continous Infusion 51 NANOGram(s)/kG/Min 0.056 mL/Hr (0.06 mL/Hr) IV Continuous <Continuous>    MEDICATIONS  (PRN):  acetaminophen     Tablet .. 650 milliGRAM(s) Oral every 6 hours PRN Temp greater or equal to 38C (100.4F), Mild Pain (1 - 3)  acetaminophen 325 mG/butalbital 50 mG/caffeine 40 mG 1 Tablet(s) Oral every 8 hours PRN Mild Pain (1 - 3)  aluminum hydroxide/magnesium hydroxide/simethicone Suspension 30 milliLiter(s) Oral every 4 hours PRN Dyspepsia  hydrocortisone 1% Ointment 1 Application(s) Topical two times a day PRN Itching  HYDROmorphone   Tablet 2 milliGRAM(s) Oral daily PRN Moderate Pain (4 - 6)  SUMAtriptan 50 milliGRAM(s) Oral every 12 hours PRN Moderate to Severe Headache    Objective:  Vitals: Vital Signs Last 24 Hrs  T(C): 36.7 (10-20-24 @ 04:33), Max: 36.8 (10-19-24 @ 13:18)  T(F): 98.1 (10-20-24 @ 04:33), Max: 98.2 (10-19-24 @ 13:18)  HR: 107 (10-20-24 @ 04:33) (99 - 109)  BP: 109/73 (10-20-24 @ 04:33) (105/61 - 119/82)  BP(mean): 82 (10-19-24 @ 20:34) (82 - 82)  RR: 18 (10-20-24 @ 04:33) (18 - 18)  SpO2: 98% (10-20-24 @ 04:33) (96% - 98%)               I&O's Summary    19 Oct 2024 07:01  -  20 Oct 2024 07:00  --------------------------------------------------------  IN: 600.5 mL / OUT: 1550 mL / NET: -949.5 mL    PHYSICAL EXAM:  General: NAD, resting in bed, on NC  Lungs: clear to auscultation in anterior lung fields, no wheezes  Heart: +s1/s2, RRR, no murmurs/gallops/rubs  Abdomen: soft, non-distended, non-tender to palpation  Extremities: no peripheral edema bilaterally    LABS:  10-19    133[L]  |  96  |  15  ----------------------------<  130[H]  4.1   |  21[L]  |  1.03  10-18    132[L]  |  97  |  19  ----------------------------<  85  5.6[H]   |  21[L]  |  0.95  10-17    133[L]  |  96  |  16  ----------------------------<  143[H]  3.6   |  23  |  1.10    Ca    8.7      19 Oct 2024 09:45  Ca    9.0      18 Oct 2024 06:24  Ca    8.8      17 Oct 2024 09:27  Phos  3.2     10-19  Mg     1.9     10-19    TPro  8.0  /  Alb  4.1  /  TBili  0.8  /  DBili  x   /  AST  43[H]  /  ALT  17  /  AlkPhos  66  10-18    PTT - ( 19 Oct 2024 09:45 )  PTT:37.0 sec              Urinalysis Basic - ( 19 Oct 2024 09:45 )    Color: x / Appearance: x / SG: x / pH: x  Gluc: 130 mg/dL / Ketone: x  / Bili: x / Urobili: x   Blood: x / Protein: x / Nitrite: x   Leuk Esterase: x / RBC: x / WBC x   Sq Epi: x / Non Sq Epi: x / Bacteria: x    ABG - ( 18 Oct 2024 19:40 )  pH, Arterial: x     pH, Blood: x     /  pCO2: x     /  pO2: x     / HCO3: x     / Base Excess: x     /  SaO2: 99.2                   13.8   14.83 )-----------( 323      ( 19 Oct 2024 09:44 )             44.7                         15.1   14.81 )-----------( 398      ( 18 Oct 2024 06:24 )             50.0                         14.2   13.72 )-----------( 412      ( 17 Oct 2024 14:16 )             46.0     RADIOLOGY & ADDITIONAL TESTS:    Imaging Personally Reviewed:  [x] YES  [ ] NO    Consultants involved in case:   Consultant(s) Notes Reviewed:  [x] YES  [ ] NO:   Care Discussed with Consultants/Other Providers [x] YES  [ ] NO

## 2024-10-20 NOTE — PROVIDER CONTACT NOTE (OTHER) - SITUATION
Pt is s/p cath on 10/18. Heparin gtt d/c before cardiac cath.
Pt reporting slight nose bleed as well as smear of blood with clots on tissue wiping although not due for period yet. Pt refusing Procardia XL despite education.
Patient HR sustaining 120s-130s.

## 2024-10-20 NOTE — PROGRESS NOTE ADULT - PROBLEM SELECTOR PLAN 10
Diet: Regular  DVT ppx: Xarelto 20mg QD  Disposition: Pending course  Code: Full code    Fluticasone and antihistamines for allergies/nasal congestion

## 2024-10-20 NOTE — PROGRESS NOTE ADULT - PROBLEM SELECTOR PLAN 5
Chronic Bradycardia s/p PPM. Presenting with palpitations.  Thyroid studies wnl.    Plan:  - S/p PPM interrogation on 10/14 - showed SVT  - F/u EP for possible change in monitor zone  - Telemetry monitoring

## 2024-10-20 NOTE — PROVIDER CONTACT NOTE (OTHER) - BACKGROUND
Pt has a PMH of pulm HTN, R heart failure, RA thrombus, DVT/PE, multiple abd wall  abscesses , asthma, bradycardia s/p PPM. Pt was on heparin gtt.
Pt on 2L NC & Xarelto for PE/DVT hx which was resumed yesterday
Patient is usually ST 100s. Patient is scheduled for cardiac cath tomorrow, 10/18.

## 2024-10-20 NOTE — PROVIDER CONTACT NOTE (OTHER) - ASSESSMENT
VSS. See VS flow sheet. Pt on 2L NC.
Other VSS, patient with no complaints or concerns at this time.
VSS, no SOB, no s/s of bleeding

## 2024-10-20 NOTE — PROVIDER CONTACT NOTE (OTHER) - ACTION/TREATMENT ORDERED:
No further interventions at this time as per Luana Pierre.
Provider to bedside to assess & reinforce education to pt. --humidified air via NC, ok to continue Xarelto dose today. Despite Procardia education pt refusing.
Provider Jeffrey Gan made aware and asked if heparin gtt should be restarted or continue to hold heparin gtt. Provider stated "let me confirm" , following up with " she was restarted on xarelto"

## 2024-10-20 NOTE — PROGRESS NOTE ADULT - PROBLEM SELECTOR PLAN 1
-History of group 1 + 4 pHTN on Sildenafil and Remodulin   -pro-BNP elevated at 1861  -CXR with pulm edema  -TTE on 10/14 shows enlarged right ventricular cavity size and reduced right ventricular systolic function with evidence of right ventricular failure  -S/p RHC/LHC 10/18    Plan:  - Pulm recs appreciated  - C/w Sildenafil 20mg TID  - Increase Remodulin from 49ng to 51ng  - S/p Bumex 1mg IV BID - transitioned to bumex 1mg PO daily as pt euvolemic  - C/w Aldactone 50mg - switched to BID per HF  - Pulm recommends decreasing prednisone 10 to 5mg daily  - Continue O2 therapy with NC, baseline 2 L  - Encourage ambulation as tolerated

## 2024-10-20 NOTE — PROGRESS NOTE ADULT - PROBLEM SELECTOR PLAN 2
-History of RV failure iso pHTN, presented with worsening SOUZA, on exam with JVD  - pro-BNP elevated at 1861  - Home regimen: Aldactone 25mg QD, Torsemide 20mg QD  - s/p Torsemide 20mg PO + 1mg Bumex (10/14) in ED.   -  TTE on 10/14 shows enlarged right ventricular cavity size and reduced right ventricular systolic function with evidence of right ventricular failure. EF 70%.   - Standing weight on 10/14: 97.6 kg  - S/p RHC/LHC 10/18    Plan:  - Consulted Dr. Chavis (outpatient HF)  - S/p Bumex 1mg IV BID - bumex 1mg PO daily  - Consider transitioning back to home torsemide 20mg  - C/w Aldactone 50mg - switched to 50mg BID per HF  - Strict I&O, daily standing weight  - Monitor BUN/Cr

## 2024-10-20 NOTE — PROVIDER CONTACT NOTE (OTHER) - REASON
Patient HR sustaining 120s-130s
Contacting provider asking  to restart heparin gtt or continue to hold heparin gtt
pt reports of nose/ vaginal bleeding/ refusing medication

## 2024-10-20 NOTE — PROGRESS NOTE ADULT - ATTENDING COMMENTS
44F PMH pHTN (group I and IV on Rimodulin and Xarelto, on home 2L O2), c/b R heart failure, history of RA thrombus s/p thrombectomy, history of DVT/PE (2016), multiple abdominal wall abscesses (s/p I&D), asthma, bradycardia s/p dual chamber PPM presenting with SOUZA, palpitations, and epigastric pain.   1.Hypoxemic respiratory failure/pulmonary HTN  -PO bumex 1 mg qd.  -S/p L/RHC   -Continue Remodulin and Revatio  -C/w home O2 2L  -Cardiology recommending procarida, but pt wants to speak to cards again   -cards/pulm follow up   2.Leukocytosis/ steroid induced   -monitor off abx   -infectious workup negative   3.?vaginal bleeding   -check transvaginal sono

## 2024-10-21 DIAGNOSIS — N93.9 ABNORMAL UTERINE AND VAGINAL BLEEDING, UNSPECIFIED: ICD-10-CM

## 2024-10-21 LAB
ADD ON TEST-SPECIMEN IN LAB: SIGNIFICANT CHANGE UP
ANION GAP SERPL CALC-SCNC: 13 MMOL/L — SIGNIFICANT CHANGE UP (ref 5–17)
BASOPHILS # BLD AUTO: 0.08 K/UL — SIGNIFICANT CHANGE UP (ref 0–0.2)
BASOPHILS NFR BLD AUTO: 0.6 % — SIGNIFICANT CHANGE UP (ref 0–2)
BUN SERPL-MCNC: 17 MG/DL — SIGNIFICANT CHANGE UP (ref 7–23)
CALCIUM SERPL-MCNC: 9.4 MG/DL — SIGNIFICANT CHANGE UP (ref 8.4–10.5)
CHLORIDE SERPL-SCNC: 95 MMOL/L — LOW (ref 96–108)
CO2 SERPL-SCNC: 24 MMOL/L — SIGNIFICANT CHANGE UP (ref 22–31)
CREAT SERPL-MCNC: 1.15 MG/DL — SIGNIFICANT CHANGE UP (ref 0.5–1.3)
EGFR: 60 ML/MIN/1.73M2 — SIGNIFICANT CHANGE UP
EOSINOPHIL # BLD AUTO: 0.27 K/UL — SIGNIFICANT CHANGE UP (ref 0–0.5)
EOSINOPHIL NFR BLD AUTO: 2 % — SIGNIFICANT CHANGE UP (ref 0–6)
GLUCOSE SERPL-MCNC: 97 MG/DL — SIGNIFICANT CHANGE UP (ref 70–99)
HCT VFR BLD CALC: 47.6 % — HIGH (ref 34.5–45)
HGB BLD-MCNC: 14.9 G/DL — SIGNIFICANT CHANGE UP (ref 11.5–15.5)
IMM GRANULOCYTES NFR BLD AUTO: 0.6 % — SIGNIFICANT CHANGE UP (ref 0–0.9)
LYMPHOCYTES # BLD AUTO: 15.2 % — SIGNIFICANT CHANGE UP (ref 13–44)
LYMPHOCYTES # BLD AUTO: 2.08 K/UL — SIGNIFICANT CHANGE UP (ref 1–3.3)
MAGNESIUM SERPL-MCNC: 2 MG/DL — SIGNIFICANT CHANGE UP (ref 1.6–2.6)
MCHC RBC-ENTMCNC: 21.4 PG — LOW (ref 27–34)
MCHC RBC-ENTMCNC: 31.3 GM/DL — LOW (ref 32–36)
MCV RBC AUTO: 68.4 FL — LOW (ref 80–100)
MONOCYTES # BLD AUTO: 0.82 K/UL — SIGNIFICANT CHANGE UP (ref 0–0.9)
MONOCYTES NFR BLD AUTO: 6 % — SIGNIFICANT CHANGE UP (ref 2–14)
NEUTROPHILS # BLD AUTO: 10.39 K/UL — HIGH (ref 1.8–7.4)
NEUTROPHILS NFR BLD AUTO: 75.6 % — SIGNIFICANT CHANGE UP (ref 43–77)
NRBC # BLD: 0 /100 WBCS — SIGNIFICANT CHANGE UP (ref 0–0)
PHOSPHATE SERPL-MCNC: 2.9 MG/DL — SIGNIFICANT CHANGE UP (ref 2.5–4.5)
PLATELET # BLD AUTO: 354 K/UL — SIGNIFICANT CHANGE UP (ref 150–400)
POTASSIUM SERPL-MCNC: 3.9 MMOL/L — SIGNIFICANT CHANGE UP (ref 3.5–5.3)
POTASSIUM SERPL-SCNC: 3.9 MMOL/L — SIGNIFICANT CHANGE UP (ref 3.5–5.3)
RBC # BLD: 6.96 M/UL — HIGH (ref 3.8–5.2)
RBC # FLD: 20.2 % — HIGH (ref 10.3–14.5)
SODIUM SERPL-SCNC: 132 MMOL/L — LOW (ref 135–145)
WBC # BLD: 13.72 K/UL — HIGH (ref 3.8–10.5)
WBC # FLD AUTO: 13.72 K/UL — HIGH (ref 3.8–10.5)

## 2024-10-21 PROCEDURE — 99223 1ST HOSP IP/OBS HIGH 75: CPT

## 2024-10-21 PROCEDURE — 99233 SBSQ HOSP IP/OBS HIGH 50: CPT

## 2024-10-21 PROCEDURE — 76830 TRANSVAGINAL US NON-OB: CPT | Mod: 26

## 2024-10-21 RX ORDER — ONDANSETRON HYDROCHLORIDE 2 MG/ML
4 INJECTION, SOLUTION INTRAMUSCULAR; INTRAVENOUS ONCE
Refills: 0 | Status: COMPLETED | OUTPATIENT
Start: 2024-10-21 | End: 2024-10-21

## 2024-10-21 RX ADMIN — ONDANSETRON HYDROCHLORIDE 4 MILLIGRAM(S): 2 INJECTION, SOLUTION INTRAMUSCULAR; INTRAVENOUS at 18:38

## 2024-10-21 RX ADMIN — IPRATROPIUM BROMIDE 500 MICROGRAM(S): 0.5 SOLUTION RESPIRATORY (INHALATION) at 05:16

## 2024-10-21 RX ADMIN — BUDESONIDE 0.5 MILLIGRAM(S): 3 CAPSULE ORAL at 05:15

## 2024-10-21 RX ADMIN — RIVAROXABAN 20 MILLIGRAM(S): 20 TABLET, FILM COATED ORAL at 11:22

## 2024-10-21 RX ADMIN — Medication 20 MILLIGRAM(S): at 23:00

## 2024-10-21 RX ADMIN — PANTOPRAZOLE SODIUM 40 MILLIGRAM(S): 40 TABLET, DELAYED RELEASE ORAL at 05:14

## 2024-10-21 RX ADMIN — Medication 1 MILLIGRAM(S): at 05:15

## 2024-10-21 RX ADMIN — Medication 5 MILLIGRAM(S): at 05:15

## 2024-10-21 RX ADMIN — Medication 50 MILLIGRAM(S): at 17:34

## 2024-10-21 RX ADMIN — IPRATROPIUM BROMIDE 500 MICROGRAM(S): 0.5 SOLUTION RESPIRATORY (INHALATION) at 23:37

## 2024-10-21 RX ADMIN — Medication 20 MILLIGRAM(S): at 06:57

## 2024-10-21 RX ADMIN — Medication 650 MILLIGRAM(S): at 23:42

## 2024-10-21 RX ADMIN — Medication 50 MILLIGRAM(S): at 05:15

## 2024-10-21 RX ADMIN — IPRATROPIUM BROMIDE 500 MICROGRAM(S): 0.5 SOLUTION RESPIRATORY (INHALATION) at 00:29

## 2024-10-21 RX ADMIN — MONTELUKAST SODIUM 10 MILLIGRAM(S): 10 TABLET, FILM COATED ORAL at 11:21

## 2024-10-21 RX ADMIN — IPRATROPIUM BROMIDE 500 MICROGRAM(S): 0.5 SOLUTION RESPIRATORY (INHALATION) at 17:33

## 2024-10-21 RX ADMIN — FLUTICASONE PROPIONATE 1 SPRAY(S): 50 SPRAY, METERED NASAL at 05:18

## 2024-10-21 RX ADMIN — BUDESONIDE 0.5 MILLIGRAM(S): 3 CAPSULE ORAL at 17:33

## 2024-10-21 RX ADMIN — Medication 20 MILLIGRAM(S): at 17:32

## 2024-10-21 RX ADMIN — IPRATROPIUM BROMIDE 500 MICROGRAM(S): 0.5 SOLUTION RESPIRATORY (INHALATION) at 11:21

## 2024-10-21 RX ADMIN — Medication 30 MILLIGRAM(S): at 05:15

## 2024-10-21 NOTE — DIETITIAN INITIAL EVALUATION ADULT - OTHER CALCULATIONS
Fluid needs deferred to team.  Estimated needs using IBW in setting of BMI >35 with consideration for HF.

## 2024-10-21 NOTE — PROGRESS NOTE ADULT - PROBLEM SELECTOR PLAN 3
RESOLVED  - History of GERD, on protonix 40mg at home.   - Epigastric tenderness on palpitation; likely related to chest pain with exertion/palpations  - Liver Enzymes and lipase WNL   - C/w protonix   - C/w maalox

## 2024-10-21 NOTE — DIETITIAN INITIAL EVALUATION ADULT - PERTINENT LABORATORY DATA
10-21    132[L]  |  95[L]  |  17  ----------------------------<  97  3.9   |  24  |  1.15    Ca    9.4      21 Oct 2024 08:42  Phos  2.9     10-21  Mg     2.0     10-21    TPro  7.6  /  Alb  4.3  /  TBili  0.8  /  DBili  x   /  AST  11  /  ALT  10  /  AlkPhos  63  10-20

## 2024-10-21 NOTE — PROGRESS NOTE ADULT - SUBJECTIVE AND OBJECTIVE BOX
Interval Events: Patient was seen and examined at bedside. No overnight events.    Patient is feeling well, Started on CCB for Response to Marcela on RHC.     REVIEW OF SYSTEMS:  Constitutional: [ -] fevers [ -] chills [- ] weight loss [- ] weight gain  CV: [- ] chest pain [ -] orthopnea [ -] palpitations [- ] murmur  Resp: [- ] cough [ -] shortness of breath [ -] dyspnea [- ] wheezing [- ] sputum [- ] hemoptysis  [x ] All other systems negative  [ ] Unable to assess ROS because ________    OBJECTIVE:  ICU Vital Signs Last 24 Hrs  T(C): 37 (21 Oct 2024 12:11), Max: 37 (21 Oct 2024 12:11)  T(F): 98.6 (21 Oct 2024 12:11), Max: 98.6 (21 Oct 2024 12:11)  HR: 96 (21 Oct 2024 12:11) (87 - 104)  BP: 123/65 (21 Oct 2024 12:11) (98/62 - 123/65)  BP(mean): 74 (20 Oct 2024 20:21) (74 - 74)  ABP: --  ABP(mean): --  RR: 18 (21 Oct 2024 12:11) (18 - 18)  SpO2: 99% (21 Oct 2024 12:11) (97% - 99%)    O2 Parameters below as of 21 Oct 2024 12:11  Patient On (Oxygen Delivery Method): room air              10-20 @ 07:01  -  10-21 @ 07:00  --------------------------------------------------------  IN: 1241.3 mL / OUT: 1600 mL / NET: -358.7 mL      CAPILLARY BLOOD GLUCOSE    PHYSICAL EXAM:    Constitutional: well-developed; well-groomed; well-nourished; no distress, Obese  Eyes: PERRL; EOMI  ENMT: Normal oropharnxy, no oral lesions, no erythema, no exudates  Neck:  Supple; no JVD; normal thyroid gland  Respiratory: airway patent; breath sounds equal; good air movement, no wheezing, no crackles, no rhonchi. no increase in WOB  Cardiovascular: regular rate and rhythm  no rub , no murmur, no gallops.   Gastrointestinal: soft; no distention, normal BS, no TTP, no organomegaly, no ascites.  Extremities: no clubbing; no cyanosis; no pedal edema,   Neurological: alert and oriented x 3; Skin: warm and dry; color normal: no rash: no ulcers  Psychiatric: Calm, no SI/HI          HOSPITAL MEDICATIONS:  MEDICATIONS  (STANDING):  buDESOnide    Inhalation Suspension 0.5 milliGRAM(s) Inhalation two times a day  buMETAnide 1 milliGRAM(s) Oral daily  influenza   Vaccine 0.5 milliLiter(s) IntraMuscular once  ipratropium    for Nebulization 500 MICROGram(s) Nebulizer every 6 hours  montelukast 10 milliGRAM(s) Oral daily  NIFEdipine XL 30 milliGRAM(s) Oral daily  pantoprazole    Tablet 40 milliGRAM(s) Oral before breakfast  predniSONE   Tablet 5 milliGRAM(s) Oral daily  rivaroxaban 20 milliGRAM(s) Oral daily  sildenafil (REVATIO) 20 milliGRAM(s) Oral <User Schedule>  spironolactone 50 milliGRAM(s) Oral two times a day  Treprostinil SubQ Continous Infusion 51 NANOGram(s)/kG/Min 0.056 mL/Hr (0.06 mL/Hr) IV Continuous <Continuous>    MEDICATIONS  (PRN):  acetaminophen     Tablet .. 650 milliGRAM(s) Oral every 6 hours PRN Temp greater or equal to 38C (100.4F), Mild Pain (1 - 3)  acetaminophen 325 mG/butalbital 50 mG/caffeine 40 mG 1 Tablet(s) Oral every 8 hours PRN Mild Pain (1 - 3)  aluminum hydroxide/magnesium hydroxide/simethicone Suspension 30 milliLiter(s) Oral every 4 hours PRN Dyspepsia  hydrocortisone 1% Ointment 1 Application(s) Topical two times a day PRN Itching  SUMAtriptan 50 milliGRAM(s) Oral every 12 hours PRN Moderate to Severe Headache      LABS:                        14.9   13.72 )-----------( 354      ( 21 Oct 2024 08:42 )             47.6     Hgb Trend: 14.9<--, 14.3<--, 13.8<--, 15.1<--, 14.2<--  10-21    132[L]  |  95[L]  |  17  ----------------------------<  97  3.9   |  24  |  1.15    Ca    9.4      21 Oct 2024 08:42  Phos  2.9     10-21  Mg     2.0     10-21    TPro  7.6  /  Alb  4.3  /  TBili  0.8  /  DBili  x   /  AST  11  /  ALT  10  /  AlkPhos  63  10-20    Creatinine Trend: 1.15<--, 1.23<--, 1.03<--, 0.95<--, 1.10<--, 1.18<--  PT/INR - ( 20 Oct 2024 10:09 )   PT: 15.5 sec;   INR: 1.36 ratio         PTT - ( 20 Oct 2024 10:09 )  PTT:41.9 sec  Urinalysis Basic - ( 21 Oct 2024 08:42 )    Color: x / Appearance: x / SG: x / pH: x  Gluc: 97 mg/dL / Ketone: x  / Bili: x / Urobili: x   Blood: x / Protein: x / Nitrite: x   Leuk Esterase: x / RBC: x / WBC x   Sq Epi: x / Non Sq Epi: x / Bacteria: x          MICROBIOLOGY:     RADIOLOGY & EKG:  [x ] Reviewed and interpreted by me

## 2024-10-21 NOTE — DIETITIAN INITIAL EVALUATION ADULT - PERTINENT MEDS FT
MEDICATIONS  (STANDING):  buDESOnide    Inhalation Suspension 0.5 milliGRAM(s) Inhalation two times a day  buMETAnide 1 milliGRAM(s) Oral daily  influenza   Vaccine 0.5 milliLiter(s) IntraMuscular once  ipratropium    for Nebulization 500 MICROGram(s) Nebulizer every 6 hours  montelukast 10 milliGRAM(s) Oral daily  NIFEdipine XL 30 milliGRAM(s) Oral daily  pantoprazole    Tablet 40 milliGRAM(s) Oral before breakfast  predniSONE   Tablet 5 milliGRAM(s) Oral daily  rivaroxaban 20 milliGRAM(s) Oral daily  sildenafil (REVATIO) 20 milliGRAM(s) Oral <User Schedule>  spironolactone 50 milliGRAM(s) Oral two times a day  Treprostinil SubQ Continous Infusion 51 NANOGram(s)/kG/Min 0.056 mL/Hr (0.06 mL/Hr) IV Continuous <Continuous>    MEDICATIONS  (PRN):  acetaminophen     Tablet .. 650 milliGRAM(s) Oral every 6 hours PRN Temp greater or equal to 38C (100.4F), Mild Pain (1 - 3)  acetaminophen 325 mG/butalbital 50 mG/caffeine 40 mG 1 Tablet(s) Oral every 8 hours PRN Mild Pain (1 - 3)  aluminum hydroxide/magnesium hydroxide/simethicone Suspension 30 milliLiter(s) Oral every 4 hours PRN Dyspepsia  hydrocortisone 1% Ointment 1 Application(s) Topical two times a day PRN Itching  SUMAtriptan 50 milliGRAM(s) Oral every 12 hours PRN Moderate to Severe Headache

## 2024-10-21 NOTE — DIETITIAN INITIAL EVALUATION ADULT - PROBLEM SELECTOR PLAN 4
WBC 13 on admission  - CT (10/13): patchy GGO unchanged from prior    Plan:  - No localizing symptoms besides SOB which may be attributable to pHTN; No fever/cough  - CT with unchanged patchy GGO  - Will monitor off abx for now, low threshold to start

## 2024-10-21 NOTE — CONSULT NOTE ADULT - ASSESSMENT
44 yr old female with PMHx of PulmHTN class I/VI (dx 2014) on continuous Treprostinil (Remodulin) on home O2 (2L), c/b Rt heart failure s/p PPM   (Dual chamber 2016), Chronic P.E. on DOAC (dx 2017), SVT s/p ablation (5/2020), Asthma   (chronic steroid use - prednisone), JULIA who presented to the hospital with complaint of palpitations and dyspnea.    #pre lung transplant evaluation  - patient previously evaluated for lung transplant, however did not require acute work up at that point.  - weight was a precluding factor at that time, currently weight 220lbs, BMI 36.6. Goal BMI closer to 30 to improve nga-operative risk profile.  - please obtain standing weight  - diurese as able to improved RV dynamics, bodyweight, respriatory load.  - uses O2 at home intermittently, currently on home 2L O2 via NC with adequate SpO2  - discussed lung transplant process and the importance of pre surgical optimization with exercise and weight loss, patient is in agreement. Discussed starting Ozempic to aid in wieght loss, however patient is hesitant given her aversion to needles  - recommend PT for rehab and Nutrition for pre transplant counseling during this admisison , encourage patient to ambulate in unit  - ideally, would obtain a cardiac MRI to assess RV dimensions, function, if PPM type permits. Please discuss with radiology. Alternatively, a MUGA scan should provide sufficient information  - recommend ophthalmology eval to ensure the eyes are structurally intact given her blurry vision  - please ensure age-appropriate cancer screening  - will continue to follow

## 2024-10-21 NOTE — DIETITIAN INITIAL EVALUATION ADULT - PROBLEM SELECTOR PLAN 2
History of asthma, on Atrovent PRN outpatient, was on Prednisone 10mg QD for pHTN    Plan:  - Will give Atrovent nebulizer q6h, patient refused albuterol stating it makes her heart race  - Will c/w Prednisone 10mg for now, defer whether or not to increase to pulm

## 2024-10-21 NOTE — PROGRESS NOTE ADULT - PROBLEM SELECTOR PLAN 10
History of PE. RA clot s/p aspiration 2022    Plan:  -C/w Xarelto; will follow up with Transplant Pulm team regarding if A/C will need to be switched to short acting for possible Tx workup

## 2024-10-21 NOTE — PROGRESS NOTE ADULT - PROBLEM SELECTOR PLAN 1
-History of group 1 + 4 pHTN on Sildenafil and Remodulin   -pro-BNP elevated at 1861  -CXR with pulm edema  -TTE on 10/14 shows enlarged right ventricular cavity size and reduced right ventricular systolic function with evidence of right ventricular failure  -S/p RHC/LHC 10/18    Plan:  - Pulm recs appreciated  - C/w Sildenafil 20mg TID  - c/w Remodulin 51ng  - c/w bumex 1mg PO daily as pt euvolemic  - C/w Aldactone 50mg - switched to BID per HF  - c/w prednisone 5mg daily  - c/w procardia 30mg, pt now amenable to taking medication   - Continue O2 therapy with NC, baseline 2 L  - Encourage ambulation as tolerated -History of group 1 + 4 pHTN on Sildenafil and Remodulin   -pro-BNP elevated at 1861  -CXR with pulm edema  -TTE on 10/14 shows enlarged right ventricular cavity size and reduced right ventricular systolic function with evidence of right ventricular failure  -S/p RHC/LHC 10/18    Plan:  - Pulm recs appreciated  - C/w Sildenafil 20mg TID  - c/w Remodulin 51ng  - c/w bumex 1mg PO daily as pt euvolemic  - C/w Aldactone 50mg - switched to BID per HF  - c/w prednisone 5mg daily  - c/w procardia 30mg, pt now amenable to taking medication   - Continue O2 therapy with NC, baseline 2 L  - Encourage ambulation as tolerated  - follow up with Lung Txp team regarding recs and further workup

## 2024-10-21 NOTE — CONSULT NOTE ADULT - ATTENDING COMMENTS
Seen and examined patient with Dr. Alford.  Independently verified the review of systems, physical examination, labs, radiological imaging. Also discussed with consultants to formulate eventual assessment and plan.      Assessment and plan:    Patient is a 44-year-old female with a history of pulmonary hypertension class I/IV who is on triple therapy.  Lung transplantation team is consulted for consideration of transplant due to advanced lung disease.    She reports initial symptoms around 10 to 11 years ago and has had 2 episodes of syncope, last in 2015-16 timeframe.  Thereafter she was placed on pulmonary hypertension therapies with improvement in symptoms of dyspnea on exertion and improvement in functional capacity.  She continues to do well with gradual progression of symptoms.  However she started requiring hospitalizations for heart failure over the last few years, usually about 4-5 hospitalizations a year due to palpitations and occasionally shortness of breath.  She usually responds to diuresis with improved functioning.  On a good day, she can ambulate about 10 minutes before feeling palpitations and dyspnea.  She currently uses 2 L supplemental oxygen during sleep, and as needed during day when exerting. She was evaluated in the past for lung transplantation in 2020.    She has a history of pulmonary embolism for which she is on anticoagulation.    Review of systems and prior medical, surgical, social and family history are as noted above    On examination, she is obese, BMI 36.  No pallor no icterus.  Lungs are clear to auscultation, P2 is loud but no parasternal heave is noted.  Abdomen soft nontender nondistended, but obese.  Extremities are warm with mild edema.    Data: Reviewed her last right heart catheterization and echocardiogram.  Reviewed her CT images from the past    Assessment and plan:  44-year-old female with a history of pulmonary hypertension class I/IV, with progressively worsening symptoms.  She has required frequent hospitalizations, which warrants consideration for transplant evaluation.  Her pulmonary hypertension medications were adjusted recently and we will follow for disease course.    - Cardiac MRI will be helpful in assessing RV and LV function, however she also has a pacemaker.  If unable to get cardiac MRI, MUGA scan might be helpful in assessing RV and LV function.    -Besides, her BMI is 36 and we discussed about weight loss in order to become a lung transplant candidate.    Thank you for the interesting consult.  Will continue to follow her closely.     Mckay Fritz MD, MPH   Lung Transplantation  , Pulmonary and Critical care Medicine  [facility]

## 2024-10-21 NOTE — DIETITIAN INITIAL EVALUATION ADULT - ORAL INTAKE PTA/DIET HISTORY
Pt reports having a good appetite and PO intake PTA. Follows regular diet; limits salt intake. Pt denies any known food allergies or intolerances. No micronutrient supplementation at home. Denies any difficulty chewing/swallowing at this time.

## 2024-10-21 NOTE — CHART NOTE - NSCHARTNOTEFT_GEN_A_CORE
Endocrine consulted for use of GLP-1 for weight loss in the transplant eligible patient. Chart reviewed. No endocrine contraindications to starting Wegovy 0.25mg weekly or Zepbound 2.5mg weekly for weight loss as long as insurance covers the medication.  Can increase dose every 4 weeks as needed based on tolerability. Consult deferred. Please contact us with any questions.    Agree with assessment and plan as above by . Reviewed all pertinent labs, glucose values, and imaging studies. Modifications made as indicated above.     Harrison Lam D.O  [phone]

## 2024-10-21 NOTE — CONSULT NOTE ADULT - SUBJECTIVE AND OBJECTIVE BOX
CHIEF COMPLAINT:    HPI:    44 yr old female with PMHx of PulmHTN class I/VI (dx 2014) on continuous Treprostinil (Remodulin) on home O2 (2L), c/b Rt heart failure s/p PPM   (Dual chamber 2016), Chronic P.E. on DOAC (dx 2017), SVT s/p ablation (5/2020), Asthma  (chronic steroid use - prednisone), JULIA who presented to the hospital with complaint of palpitations and dyspnea.  Patient with multiple hospitalizations yearly for similar complaints, where she feels subjective dyspnea and SOUZA as a result of palpitations. Additionally endorses some epigastric pain. No cough worse from baseline, sputum produciton, hemoptysis. Subjectively improved after Bumex.  She denies recent syncopal events. Last syncope ~2015. Was previosuly evaluated for lung transplant in 2020/2021, however not since.    On a good day, she can ambulate for around 10 minutes before becoming dyspneic. This has gradually worsened over the past years.  Former smoker. Does endorse chronic blurry vision, thinks it is related to  the shape of her eyes, unable to provide a clear diagnosis.  Denies major GI/ symptoms.    Transplant pulmonology consulted for re-evaluation.      PAST MEDICAL & SURGICAL HISTORY:  Tachycardia      Anemia      Pulmonary hypertension      Pulmonary embolism      Asthma  On home O2 nightly at 2L via NC      PE (pulmonary thromboembolism)  on Xarelto 10 mg daily      Sinusitis      Corneal disorder      Right heart failure      CAD (coronary artery disease)      H/O pulmonary hypertension      Pulmonary embolism      Asthma      History of tachycardia      On supplemental oxygen by nasal cannula  O2 @ 2L      Pacemaker      Skin mass  left upper thigh mass      History of tubal ligation      Pacemaker      H/O tubal ligation      History of pacemaker  12/19 - [employer] model [employer]gevity [id number]          FAMILY HISTORY:  Family history of diabetes mellitus  in brother    Family history of diabetes mellitus in mother    FH: anemia        SOCIAL HISTORY:  Smoking: [ ] Never Smoked [ x ] Former Smoker (__ packs x ___ years) [ ] Current Smoker  (__ packs x ___ years)  Substance Use: [ ] Never Used [ ] Used ____  EtOH Use:  Marital Status: [ ] Single [ ]  [ ]  [ ]   Sexual History:   Occupation:  Recent Travel:  Country of Birth:  Advance Directives:    Allergies    adhesives (Rash)  vancomycin (Rash)  penicillin (Rash)    Intolerances        HOME MEDICATIONS:  Home Medications:  Atrovent 18 mcg/inh inhalation aerosol: 1 inhaled 4 times a day as needed for  shortness of breath and/or wheezing (14 Oct 2024 05:41)  omeprazole 40 mg oral delayed release capsule: 1 cap(s) orally once a day (14 Oct 2024 05:42)  predniSONE 10 mg oral tablet: 1 tab(s) orally once a day (14 Oct 2024 05:42)  Remodulin 5 mg/mL injectable solution: 1 application injectable once a day patient is taking 48ng/kg/min via her own SQ PUMP (14 Oct 2024 05:42)  rivaroxaban 20 mg oral tablet: 1 tab(s) orally once a day (before a meal) (14 Oct 2024 05:42)  sildenafil 20 mg oral tablet: 1 tab(s) orally every 8 hours (14 Oct 2024 05:42)  spironolactone 25 mg oral tablet: 2 tab(s) orally once a day (14 Oct 2024 05:42)      REVIEW OF SYSTEMS:  [ x ] All other systems negative  [ ] Unable to assess ROS because ________    OBJECTIVE:  ICU Vital Signs Last 24 Hrs  T(C): 37 (21 Oct 2024 12:11), Max: 37 (21 Oct 2024 12:11)  T(F): 98.6 (21 Oct 2024 12:11), Max: 98.6 (21 Oct 2024 12:11)  HR: 96 (21 Oct 2024 12:11) (87 - 104)  BP: 123/65 (21 Oct 2024 12:11) (98/62 - 123/65)  BP(mean): 74 (20 Oct 2024 20:21) (74 - 74)  ABP: --  ABP(mean): --  RR: 18 (21 Oct 2024 12:11) (18 - 18)  SpO2: 99% (21 Oct 2024 12:11) (97% - 99%)    O2 Parameters below as of 21 Oct 2024 12:11  Patient On (Oxygen Delivery Method): room air              10-20 @ 07:01  -  10-21 @ 07:00  --------------------------------------------------------  IN: 1241.3 mL / OUT: 1600 mL / NET: -358.7 mL      CAPILLARY BLOOD GLUCOSE          PHYSICAL EXAM:  General: NAD  HEENT: Normal sclera  Lymph Nodes: No LAD  Respiratory: CTABL  Cardiovascular: Regular rate and rhythm no m/r/g  Abdomen: Nontender nondistended  Extremities: No peripheral edema  Skin: No rashes  Neurological: No appreciable neurologic deficits  Psychiatry: Appropriate mood and affect    LINES:     HOSPITAL MEDICATIONS:  Standing Meds:  buDESOnide    Inhalation Suspension 0.5 milliGRAM(s) Inhalation two times a day  buMETAnide 1 milliGRAM(s) Oral daily  influenza   Vaccine 0.5 milliLiter(s) IntraMuscular once  ipratropium    for Nebulization 500 MICROGram(s) Nebulizer every 6 hours  montelukast 10 milliGRAM(s) Oral daily  NIFEdipine XL 30 milliGRAM(s) Oral daily  pantoprazole    Tablet 40 milliGRAM(s) Oral before breakfast  predniSONE   Tablet 5 milliGRAM(s) Oral daily  rivaroxaban 20 milliGRAM(s) Oral daily  sildenafil (REVATIO) 20 milliGRAM(s) Oral <User Schedule>  spironolactone 50 milliGRAM(s) Oral two times a day  Treprostinil SubQ Continous Infusion 51 NANOGram(s)/kG/Min 0.056 mL/Hr IV Continuous <Continuous>      PRN Meds:  acetaminophen     Tablet .. 650 milliGRAM(s) Oral every 6 hours PRN  acetaminophen 325 mG/butalbital 50 mG/caffeine 40 mG 1 Tablet(s) Oral every 8 hours PRN  aluminum hydroxide/magnesium hydroxide/simethicone Suspension 30 milliLiter(s) Oral every 4 hours PRN  hydrocortisone 1% Ointment 1 Application(s) Topical two times a day PRN  SUMAtriptan 50 milliGRAM(s) Oral every 12 hours PRN      LABS:                        14.9   13.72 )-----------( 354      ( 21 Oct 2024 08:42 )             47.6     Hgb Trend: 14.9<--, 14.3<--, 13.8<--, 15.1<--, 14.2<--  10-21    132[L]  |  95[L]  |  17  ----------------------------<  97  3.9   |  24  |  1.15    Ca    9.4      21 Oct 2024 08:42  Phos  2.9     10-21  Mg     2.0     10-21    TPro  7.6  /  Alb  4.3  /  TBili  0.8  /  DBili  x   /  AST  11  /  ALT  10  /  AlkPhos  63  10-20    Creatinine Trend: 1.15<--, 1.23<--, 1.03<--, 0.95<--, 1.10<--, 1.18<--  PT/INR - ( 20 Oct 2024 10:09 )   PT: 15.5 sec;   INR: 1.36 ratio         PTT - ( 20 Oct 2024 10:09 )  PTT:41.9 sec  Urinalysis Basic - ( 21 Oct 2024 08:42 )    Color: x / Appearance: x / SG: x / pH: x  Gluc: 97 mg/dL / Ketone: x  / Bili: x / Urobili: x   Blood: x / Protein: x / Nitrite: x   Leuk Esterase: x / RBC: x / WBC x   Sq Epi: x / Non Sq Epi: x / Bacteria: x            MICROBIOLOGY:       RADIOLOGY:  [ x ] Reviewed and interpreted by me    PULMONARY FUNCTION TESTS:    EKG:

## 2024-10-21 NOTE — DIETITIAN INITIAL EVALUATION ADULT - OTHER INFO
- Wt hx:         - UBW: 214 pounds per pt; denies any wt changes.          - Dosing wt: 219 pounds (10/13)         - Wt hx per chart in pounds: 215 (10/17), 216.7 (10/18), 217.3 (10/19), 214.2 (10/21); Wt hx per chart review: 220 (7/16)  	- Weight fluctuations possible partially in setting of fluid shifts (pt with HF, mild edema, on diuretics).          - RD to continue to monitor weight trends as able.   - Nutritionally Pertinent Meds in-house: prednisone.     - Cardio:  	- HF.  	- Diuresing with Bumex, Aldactone.   	- RRT called 10/15 for rapid heart rate.     - Pulm:  	- Pulmonary HTN.    - GI:  	- ordered for PPI.

## 2024-10-21 NOTE — CONSULT NOTE ADULT - TIME BILLING
Reviewing the EMR, vitals, imaging, medication list recent labs, prior records, and coordinating care with medical providers. This time excludes procedures and teaching.
conducting Interview, examination, reviewing vitals, laboratory and radiological images; besides coordinating care with primary team and consultants.
- Review of chart and consultation notes  - Exam and discussion with patient  - Review of laboratory and imaging   - Establishing a care plan for patient  - Communication with other providers

## 2024-10-21 NOTE — PROGRESS NOTE ADULT - ASSESSMENT
45 yo F w/ PMHx PHTN (group I and IV) currently on remodulin and xarelto, chronic hypoxemic respiratory failure on 2L home O2, RA thrombus s/p thrombectomy, Asthma (Pred 10mg), prior AVRNT s/p ablation, bradycardia s/p dual chamber PPM, and hx of abdominal wall abscesses presenting for increased dyspnea and palpitations with HR into 150s. CTPA negative for PE but showing bilateral GGOs. ED POCUS with IVC >2cm, resp variation noted, severe RV dilation. BNP elevated to 1800, prev 800 on recent admission.  No wheezing on exam.  Overall findings consistent with HF exacerbation. Echo with RV dilation, mildly reduced RV function. EP investigated PPM but no arrhythmias noted?  RHC/LHC findings noted, notably some NO response with significant improvement in CI/CO, mPAP 56 to 43, reduced PVR from 11.2 MONACO to 6.3 MONACO. RA 3 and PCWP 9 c/w euvolemia.    # Severe pHTN, group 1 and IV  # Obesity  # SVT  - Severe pHTN, group 1 diease. S/p RHC with some Marcela response.   - C/W Sildenafil, remodulin at current dose.   - C/w pulmicort, and atrovent. Not on KEE 2/2 to hx of SVT/tachycardia  - C/W full a/c   - on Bumex 1mg PO daily, aldactone  - supplemental O2 as needed for sat >93 to prevent hypoxemic vasoconstriction.   - C/W pred 5mg  - D/W lung transplant team. May need inpatient work up. Would get cMRI to assess for RV function. Will need to see if MRI is compatible If not compactable will need MUGA scan.   - Check A1c. Endocrine consult in AM for weight loss, Wegocy vs Ozempic to assist with weight loss for transplant. Current BMI 36.6  - Pulmonary will follow.

## 2024-10-21 NOTE — PROGRESS NOTE ADULT - PROBLEM SELECTOR PLAN 5
- Pt reports migraine headaches starting on 10/14.  - Pt concerned that headache caused by different brand of sildenafil than her home regiment. Pt instructed to bring home medication to hospital.   -s/p Sumatriptan 50mg PO   -HA improved

## 2024-10-21 NOTE — PROGRESS NOTE ADULT - PROBLEM SELECTOR PLAN 2
-History of RV failure iso pHTN, presented with worsening SOUZA, on exam with JVD  - pro-BNP elevated at 1861  - Home regimen: Aldactone 25mg QD, Torsemide 20mg QD  - s/p Torsemide 20mg PO + 1mg Bumex (10/14) in ED.   -  TTE on 10/14 shows enlarged right ventricular cavity size and reduced right ventricular systolic function with evidence of right ventricular failure. EF 70%.   - Standing weight on 10/14: 97.6 kg  - S/p RHC/LHC 10/18    Plan:  - HF following   - S/p Bumex 1mg IV BID - bumex 1mg PO daily  - Consider transitioning back to home torsemide 20mg  - C/w Aldactone 50mg BID  - Strict I&O, daily standing weight  - Monitor BUN/Cr -History of RV failure iso pHTN, presented with worsening SOUZA, on exam with JVD  - pro-BNP elevated at 1861  - Home regimen: Aldactone 25mg QD, Torsemide 20mg QD  - s/p Torsemide 20mg PO + 1mg Bumex (10/14) in ED.   -  TTE on 10/14 shows enlarged right ventricular cavity size and reduced right ventricular systolic function with evidence of right ventricular failure. EF 70%.   - daily weights, strict I/Os  - S/p RHC/LHC 10/18    Plan:  - HF following   - S/p Bumex 1mg IV BID - bumex 1mg PO daily  - Consider transitioning back to home torsemide 20mg  - C/w Aldactone 50mg BID  - Monitor BUN/Cr

## 2024-10-21 NOTE — PROGRESS NOTE ADULT - PROBLEM SELECTOR PLAN 7
WBC 13 on admission likely due to chronic steroid use, infectious etiology less likely.  - CT (10/13): patchy GGO unchanged from prior  - afebrile, tachycardic   - UA negative  - No localizing symptoms besides SOB which may be attributable to pHTN; No fever/cough  - Full RVP negative  - BCx NGTD  - Will monitor off abx for now, low threshold to start

## 2024-10-21 NOTE — PROGRESS NOTE ADULT - SUBJECTIVE AND OBJECTIVE BOX
Gail Ruelas MD  Division of Hospital Medicine  Reachable on MS Teams    PROGRESS NOTE:     Patient is a 44y old  Female who presents with a chief complaint of Pulmonary hypertension     (21 Oct 2024 13:27)      SUBJECTIVE / OVERNIGHT EVENTS: No acute events overnight, seen this afternoon. Reports that vaginal bleeding and epistaxis have stopped and headache is stable if not improving. No other complaints    ADDITIONAL REVIEW OF SYSTEMS:    MEDICATIONS  (STANDING):  buDESOnide    Inhalation Suspension 0.5 milliGRAM(s) Inhalation two times a day  buMETAnide 1 milliGRAM(s) Oral daily  influenza   Vaccine 0.5 milliLiter(s) IntraMuscular once  ipratropium    for Nebulization 500 MICROGram(s) Nebulizer every 6 hours  montelukast 10 milliGRAM(s) Oral daily  NIFEdipine XL 30 milliGRAM(s) Oral daily  pantoprazole    Tablet 40 milliGRAM(s) Oral before breakfast  predniSONE   Tablet 5 milliGRAM(s) Oral daily  rivaroxaban 20 milliGRAM(s) Oral daily  sildenafil (REVATIO) 20 milliGRAM(s) Oral <User Schedule>  spironolactone 50 milliGRAM(s) Oral two times a day  Treprostinil SubQ Continous Infusion 51 NANOGram(s)/kG/Min 0.056 mL/Hr (0.06 mL/Hr) IV Continuous <Continuous>    MEDICATIONS  (PRN):  acetaminophen     Tablet .. 650 milliGRAM(s) Oral every 6 hours PRN Temp greater or equal to 38C (100.4F), Mild Pain (1 - 3)  acetaminophen 325 mG/butalbital 50 mG/caffeine 40 mG 1 Tablet(s) Oral every 8 hours PRN Mild Pain (1 - 3)  aluminum hydroxide/magnesium hydroxide/simethicone Suspension 30 milliLiter(s) Oral every 4 hours PRN Dyspepsia  hydrocortisone 1% Ointment 1 Application(s) Topical two times a day PRN Itching  SUMAtriptan 50 milliGRAM(s) Oral every 12 hours PRN Moderate to Severe Headache      CAPILLARY BLOOD GLUCOSE        I&O's Summary    20 Oct 2024 07:01  -  21 Oct 2024 07:00  --------------------------------------------------------  IN: 1241.3 mL / OUT: 1600 mL / NET: -358.7 mL        PHYSICAL EXAM:  Vital Signs Last 24 Hrs  T(C): 37 (21 Oct 2024 12:11), Max: 37 (21 Oct 2024 12:11)  T(F): 98.6 (21 Oct 2024 12:11), Max: 98.6 (21 Oct 2024 12:11)  HR: 96 (21 Oct 2024 12:11) (87 - 104)  BP: 123/65 (21 Oct 2024 12:11) (98/62 - 123/65)  BP(mean): 74 (20 Oct 2024 20:21) (74 - 74)  RR: 18 (21 Oct 2024 12:11) (18 - 18)  SpO2: 99% (21 Oct 2024 12:11) (97% - 99%)    Parameters below as of 21 Oct 2024 12:11  Patient On (Oxygen Delivery Method): room air        CONSTITUTIONAL: NAD, well-developed, sitting in chair, NC in hand  RESPIRATORY: Normal respiratory effort; lungs are clear to auscultation bilaterally  CARDIOVASCULAR: Regular rate and rhythm, normal S1 and S2, no murmur/rub/gallop; No lower extremity edema  ABDOMEN: Nontender to palpation, normoactive bowel sounds, no rebound/guarding  PSYCH: A+O to person, place, and time; affect appropriate    LABS:                        14.9   13.72 )-----------( 354      ( 21 Oct 2024 08:42 )             47.6     10-21    132[L]  |  95[L]  |  17  ----------------------------<  97  3.9   |  24  |  1.15    Ca    9.4      21 Oct 2024 08:42  Phos  2.9     10-21  Mg     2.0     10-21    TPro  7.6  /  Alb  4.3  /  TBili  0.8  /  DBili  x   /  AST  11  /  ALT  10  /  AlkPhos  63  10-20    PT/INR - ( 20 Oct 2024 10:09 )   PT: 15.5 sec;   INR: 1.36 ratio         PTT - ( 20 Oct 2024 10:09 )  PTT:41.9 sec      Urinalysis Basic - ( 21 Oct 2024 08:42 )    Color: x / Appearance: x / SG: x / pH: x  Gluc: 97 mg/dL / Ketone: x  / Bili: x / Urobili: x   Blood: x / Protein: x / Nitrite: x   Leuk Esterase: x / RBC: x / WBC x   Sq Epi: x / Non Sq Epi: x / Bacteria: x          RADIOLOGY & ADDITIONAL TESTS:  Results Reviewed:   Imaging Personally Reviewed:  Electrocardiogram Personally Reviewed:    COORDINATION OF CARE:  Care Discussed with Consultants/Other Providers [Y/N]: Transplant Pulm   Prior or Outpatient Records Reviewed [Y/N]:

## 2024-10-21 NOTE — PROGRESS NOTE ADULT - PROBLEM SELECTOR PLAN 9
History of asthma, on Atrovent PRN outpatient, was on Prednisone 10mg QD for pHTN    Plan:  - Pulbritton aguero appreciated  - Will give Atrovent nebulizer q6h, patient refused albuterol stating it makes her heart race  - Will c/w Prednisone 5mg  - Start Singulair 10mg PO  - c/w Budesonide 0.5mg inhaled BID

## 2024-10-21 NOTE — DIETITIAN INITIAL EVALUATION ADULT - ADD RECOMMEND
1) Consider adding DASH diet restriction as tolerated. Defer texture/consistency to SLP/team.   2) Recommend Multivitamin daily pending no medical contraindications.  3) Continue to monitor PO intake, weight, labs, skin, GI status, and diet.

## 2024-10-21 NOTE — DIETITIAN INITIAL EVALUATION ADULT - PROBLEM SELECTOR PLAN 3
History of RV failure iso pHTN, presented with worsening SOUZA, on exam with JVD  - Home regimen: Aldactone 25mg QD, Torsemide 20mg QD    Plan:  - s/p Torsemide 20mg PO + 1mg Bumex (10/14), reassess daily given RHF  - Strict I&O, daily weight  - Will repeat TTE  - EP for device interrogation in the AM given complaints of palpitations

## 2024-10-21 NOTE — PROGRESS NOTE ADULT - ASSESSMENT
44 year old woman with a history of pHTN (group I and IV on Rimodulin and Xarelto, on home 2L O2), c/b R heart failure, history of RA thrombus s/p thrombectomy, history of DVT/PE (2016), multiple abdominal wall abscesses (s/p I&D), asthma, bradycardia s/p dual chamber PPM. She presents with worsening palpitations and SOB on exertion, concerning for pHTN exacerbation complicated by right heart failure

## 2024-10-21 NOTE — DIETITIAN INITIAL EVALUATION ADULT - ENERGY INTAKE
In house, pt reports good appetite and PO intake. Flowsheets indicate pt consuming >75% of meals.  Adequate (%)

## 2024-10-21 NOTE — DIETITIAN INITIAL EVALUATION ADULT - NSICDXPASTSURGICALHX_GEN_ALL_CORE_FT
PAST SURGICAL HISTORY:  H/O tubal ligation     History of pacemaker 12/19 - [employer] model Ingevity [id number]    History of tubal ligation     Pacemaker

## 2024-10-21 NOTE — PROGRESS NOTE ADULT - PROBLEM SELECTOR PLAN 4
-noted with clots on 10/20, no cramping  -resolved on 10/21  -vaginal US pending, to be done today, f/u results

## 2024-10-21 NOTE — PROGRESS NOTE ADULT - PROBLEM SELECTOR PLAN 6
Chronic Bradycardia s/p PPM. Presenting with palpitations.  Thyroid studies wnl.  - S/p PPM interrogation on 10/14 - showed SVT  - EP consult noted, no plans to adjust VT detection rate, signed off   - Telemetry monitoring

## 2024-10-22 DIAGNOSIS — Z01.818 ENCOUNTER FOR OTHER PREPROCEDURAL EXAMINATION: ICD-10-CM

## 2024-10-22 LAB
24R-OH-CALCIDIOL SERPL-MCNC: 9.4 NG/ML — LOW (ref 30–80)
A1C WITH ESTIMATED AVERAGE GLUCOSE RESULT: 5.6 % — SIGNIFICANT CHANGE UP (ref 4–5.6)
ALBUMIN SERPL ELPH-MCNC: 4.4 G/DL — SIGNIFICANT CHANGE UP (ref 3.3–5)
ALP SERPL-CCNC: 65 U/L — SIGNIFICANT CHANGE UP (ref 40–120)
ALT FLD-CCNC: 15 U/L — SIGNIFICANT CHANGE UP (ref 10–45)
AMYLASE P1 CFR SERPL: 63 U/L — SIGNIFICANT CHANGE UP (ref 25–125)
ANION GAP SERPL CALC-SCNC: 13 MMOL/L — SIGNIFICANT CHANGE UP (ref 5–17)
ANION GAP SERPL CALC-SCNC: 14 MMOL/L — SIGNIFICANT CHANGE UP (ref 5–17)
APPEARANCE UR: CLEAR — SIGNIFICANT CHANGE UP
APTT BLD: 43.2 SEC — HIGH (ref 24.5–35.6)
APTT BLD: 48.6 SEC — HIGH (ref 24.5–35.6)
AST SERPL-CCNC: 13 U/L — SIGNIFICANT CHANGE UP (ref 10–40)
BASOPHILS # BLD AUTO: 0.06 K/UL — SIGNIFICANT CHANGE UP (ref 0–0.2)
BASOPHILS NFR BLD AUTO: 0.5 % — SIGNIFICANT CHANGE UP (ref 0–2)
BILIRUB DIRECT SERPL-MCNC: 0.2 MG/DL — SIGNIFICANT CHANGE UP (ref 0–0.3)
BILIRUB INDIRECT FLD-MCNC: 0.6 MG/DL — SIGNIFICANT CHANGE UP (ref 0.2–1)
BILIRUB SERPL-MCNC: 0.8 MG/DL — SIGNIFICANT CHANGE UP (ref 0.2–1.2)
BILIRUB UR-MCNC: NEGATIVE — SIGNIFICANT CHANGE UP
BLD GP AB SCN SERPL QL: NEGATIVE — SIGNIFICANT CHANGE UP
BUN SERPL-MCNC: 21 MG/DL — SIGNIFICANT CHANGE UP (ref 7–23)
BUN SERPL-MCNC: 21 MG/DL — SIGNIFICANT CHANGE UP (ref 7–23)
CALCIUM SERPL-MCNC: 8.6 MG/DL — SIGNIFICANT CHANGE UP (ref 8.4–10.5)
CALCIUM SERPL-MCNC: 8.8 MG/DL — SIGNIFICANT CHANGE UP (ref 8.4–10.5)
CHLORIDE SERPL-SCNC: 96 MMOL/L — SIGNIFICANT CHANGE UP (ref 96–108)
CHLORIDE SERPL-SCNC: 97 MMOL/L — SIGNIFICANT CHANGE UP (ref 96–108)
CHOLEST SERPL-MCNC: 160 MG/DL — SIGNIFICANT CHANGE UP
CK SERPL-CCNC: 53 U/L — SIGNIFICANT CHANGE UP (ref 25–170)
CO2 SERPL-SCNC: 22 MMOL/L — SIGNIFICANT CHANGE UP (ref 22–31)
CO2 SERPL-SCNC: 23 MMOL/L — SIGNIFICANT CHANGE UP (ref 22–31)
COLOR SPEC: YELLOW — SIGNIFICANT CHANGE UP
CREAT SERPL-MCNC: 1.1 MG/DL — SIGNIFICANT CHANGE UP (ref 0.5–1.3)
CREAT SERPL-MCNC: 1.12 MG/DL — SIGNIFICANT CHANGE UP (ref 0.5–1.3)
CRP SERPL-MCNC: 6 MG/L — HIGH (ref 0–4)
DIFF PNL FLD: NEGATIVE — SIGNIFICANT CHANGE UP
EGFR: 62 ML/MIN/1.73M2 — SIGNIFICANT CHANGE UP
EGFR: 64 ML/MIN/1.73M2 — SIGNIFICANT CHANGE UP
EOSINOPHIL # BLD AUTO: 0.24 K/UL — SIGNIFICANT CHANGE UP (ref 0–0.5)
EOSINOPHIL NFR BLD AUTO: 2.2 % — SIGNIFICANT CHANGE UP (ref 0–6)
ESTIMATED AVERAGE GLUCOSE: 114 MG/DL — SIGNIFICANT CHANGE UP (ref 68–114)
FERRITIN SERPL-MCNC: 37 NG/ML — SIGNIFICANT CHANGE UP (ref 15–150)
GGT SERPL-CCNC: 36 U/L — SIGNIFICANT CHANGE UP (ref 8–40)
GLUCOSE SERPL-MCNC: 179 MG/DL — HIGH (ref 70–99)
GLUCOSE SERPL-MCNC: 81 MG/DL — SIGNIFICANT CHANGE UP (ref 70–99)
GLUCOSE UR QL: NEGATIVE MG/DL — SIGNIFICANT CHANGE UP
HBV SURFACE AG SER-ACNC: SIGNIFICANT CHANGE UP
HCG SERPL-ACNC: <2 MIU/ML — SIGNIFICANT CHANGE UP
HCT VFR BLD CALC: 44.2 % — SIGNIFICANT CHANGE UP (ref 34.5–45)
HCV AB S/CO SERPL IA: 0.05 S/CO — SIGNIFICANT CHANGE UP
HCV AB SERPL-IMP: SIGNIFICANT CHANGE UP
HCYS SERPL-MCNC: 18.7 UMOL/L — HIGH
HDLC SERPL-MCNC: 46 MG/DL — LOW
HGB BLD-MCNC: 13.5 G/DL — SIGNIFICANT CHANGE UP (ref 11.5–15.5)
IMM GRANULOCYTES NFR BLD AUTO: 0.4 % — SIGNIFICANT CHANGE UP (ref 0–0.9)
INR BLD: 2.11 RATIO — HIGH (ref 0.85–1.16)
IRON SATN MFR SERPL: 16 % — SIGNIFICANT CHANGE UP (ref 14–50)
IRON SATN MFR SERPL: 72 UG/DL — SIGNIFICANT CHANGE UP (ref 30–160)
KETONES UR-MCNC: NEGATIVE MG/DL — SIGNIFICANT CHANGE UP
LEGIONELLA AG UR QL: NEGATIVE — SIGNIFICANT CHANGE UP
LEUKOCYTE ESTERASE UR-ACNC: NEGATIVE — SIGNIFICANT CHANGE UP
LIDOCAIN IGE QN: 40 U/L — SIGNIFICANT CHANGE UP (ref 7–60)
LIPID PNL WITH DIRECT LDL SERPL: 99 MG/DL — SIGNIFICANT CHANGE UP
LYMPHOCYTES # BLD AUTO: 1.36 K/UL — SIGNIFICANT CHANGE UP (ref 1–3.3)
LYMPHOCYTES # BLD AUTO: 12.3 % — LOW (ref 13–44)
MAGNESIUM SERPL-MCNC: 1.8 MG/DL — SIGNIFICANT CHANGE UP (ref 1.6–2.6)
MAGNESIUM SERPL-MCNC: 2 MG/DL — SIGNIFICANT CHANGE UP (ref 1.6–2.6)
MCHC RBC-ENTMCNC: 21.2 PG — LOW (ref 27–34)
MCHC RBC-ENTMCNC: 30.5 GM/DL — LOW (ref 32–36)
MCV RBC AUTO: 69.4 FL — LOW (ref 80–100)
MONOCYTES # BLD AUTO: 0.51 K/UL — SIGNIFICANT CHANGE UP (ref 0–0.9)
MONOCYTES NFR BLD AUTO: 4.6 % — SIGNIFICANT CHANGE UP (ref 2–14)
MRSA PCR RESULT.: DETECTED
NEUTROPHILS # BLD AUTO: 8.84 K/UL — HIGH (ref 1.8–7.4)
NEUTROPHILS NFR BLD AUTO: 80 % — HIGH (ref 43–77)
NITRITE UR-MCNC: NEGATIVE — SIGNIFICANT CHANGE UP
NON HDL CHOLESTEROL: 114 MG/DL — SIGNIFICANT CHANGE UP
NRBC # BLD: 0 /100 WBCS — SIGNIFICANT CHANGE UP (ref 0–0)
NT-PROBNP SERPL-SCNC: 116 PG/ML — SIGNIFICANT CHANGE UP (ref 0–300)
PH UR: 5.5 — SIGNIFICANT CHANGE UP (ref 5–8)
PHOSPHATE SERPL-MCNC: 3.1 MG/DL — SIGNIFICANT CHANGE UP (ref 2.5–4.5)
PHOSPHATE SERPL-MCNC: 3.3 MG/DL — SIGNIFICANT CHANGE UP (ref 2.5–4.5)
PLATELET # BLD AUTO: 305 K/UL — SIGNIFICANT CHANGE UP (ref 150–400)
POTASSIUM SERPL-MCNC: 3.6 MMOL/L — SIGNIFICANT CHANGE UP (ref 3.5–5.3)
POTASSIUM SERPL-MCNC: 4.3 MMOL/L — SIGNIFICANT CHANGE UP (ref 3.5–5.3)
POTASSIUM SERPL-SCNC: 3.6 MMOL/L — SIGNIFICANT CHANGE UP (ref 3.5–5.3)
POTASSIUM SERPL-SCNC: 4.3 MMOL/L — SIGNIFICANT CHANGE UP (ref 3.5–5.3)
PREALB SERPL-MCNC: 24 MG/DL — SIGNIFICANT CHANGE UP (ref 20–40)
PROT SERPL-MCNC: 7.7 G/DL — SIGNIFICANT CHANGE UP (ref 6–8.3)
PROT UR-MCNC: NEGATIVE MG/DL — SIGNIFICANT CHANGE UP
PROTHROM AB SERPL-ACNC: 23.9 SEC — HIGH (ref 9.9–13.4)
RBC # BLD: 6.37 M/UL — HIGH (ref 3.8–5.2)
RBC # FLD: 19.7 % — HIGH (ref 10.3–14.5)
RH IG SCN BLD-IMP: POSITIVE — SIGNIFICANT CHANGE UP
S AUREUS DNA NOSE QL NAA+PROBE: DETECTED
SARS-COV-2 IGG+IGM SERPL QL IA: 0.08 INDEX — SIGNIFICANT CHANGE UP
SARS-COV-2 IGG+IGM SERPL QL IA: >250 U/ML — HIGH
SARS-COV-2 IGG+IGM SERPL QL IA: NEGATIVE — SIGNIFICANT CHANGE UP
SARS-COV-2 IGG+IGM SERPL QL IA: POSITIVE
SODIUM SERPL-SCNC: 132 MMOL/L — LOW (ref 135–145)
SODIUM SERPL-SCNC: 133 MMOL/L — LOW (ref 135–145)
SP GR SPEC: 1.02 — SIGNIFICANT CHANGE UP (ref 1–1.03)
T3 SERPL-MCNC: 85 NG/DL — SIGNIFICANT CHANGE UP (ref 80–200)
T4 AB SER-ACNC: 8.8 UG/DL — SIGNIFICANT CHANGE UP (ref 4.6–12)
TIBC SERPL-MCNC: 456 UG/DL — HIGH (ref 220–430)
TRIGL SERPL-MCNC: 77 MG/DL — SIGNIFICANT CHANGE UP
TSH SERPL-MCNC: 1.78 UIU/ML — SIGNIFICANT CHANGE UP (ref 0.27–4.2)
UIBC SERPL-MCNC: 383 UG/DL — HIGH (ref 110–370)
URATE SERPL-MCNC: 5.4 MG/DL — SIGNIFICANT CHANGE UP (ref 2.5–7)
UROBILINOGEN FLD QL: 1 MG/DL — SIGNIFICANT CHANGE UP (ref 0.2–1)
WBC # BLD: 11.05 K/UL — HIGH (ref 3.8–10.5)
WBC # FLD AUTO: 11.05 K/UL — HIGH (ref 3.8–10.5)

## 2024-10-22 PROCEDURE — 93930 UPPER EXTREMITY STUDY: CPT | Mod: 26

## 2024-10-22 PROCEDURE — 71046 X-RAY EXAM CHEST 2 VIEWS: CPT | Mod: 26

## 2024-10-22 PROCEDURE — 93880 EXTRACRANIAL BILAT STUDY: CPT | Mod: 26

## 2024-10-22 PROCEDURE — 93970 EXTREMITY STUDY: CPT | Mod: 26

## 2024-10-22 PROCEDURE — 99233 SBSQ HOSP IP/OBS HIGH 50: CPT

## 2024-10-22 PROCEDURE — 78598 LUNG PERF&VENTILAT DIFERENTL: CPT | Mod: 26

## 2024-10-22 PROCEDURE — 93923 UPR/LXTR ART STDY 3+ LVLS: CPT | Mod: 26

## 2024-10-22 PROCEDURE — 93925 LOWER EXTREMITY STUDY: CPT | Mod: 26

## 2024-10-22 RX ORDER — SEMAGLUTIDE 1.34 MG/ML
0.25 INJECTION, SOLUTION SUBCUTANEOUS
Qty: 4 | Refills: 0
Start: 2024-10-22 | End: 2024-10-25

## 2024-10-22 RX ORDER — SEMAGLUTIDE 1.34 MG/ML
0.25 INJECTION, SOLUTION SUBCUTANEOUS
Qty: 4 | Refills: 0
Start: 2024-10-22 | End: 2024-11-20

## 2024-10-22 RX ORDER — NIFEDIPINE 90 MG
30 TABLET, EXTENDED RELEASE 24 HR ORAL
Refills: 0 | Status: DISCONTINUED | OUTPATIENT
Start: 2024-10-23 | End: 2024-10-30

## 2024-10-22 RX ADMIN — Medication 30 MILLIGRAM(S): at 06:06

## 2024-10-22 RX ADMIN — PANTOPRAZOLE SODIUM 40 MILLIGRAM(S): 40 TABLET, DELAYED RELEASE ORAL at 06:20

## 2024-10-22 RX ADMIN — IPRATROPIUM BROMIDE 500 MICROGRAM(S): 0.5 SOLUTION RESPIRATORY (INHALATION) at 06:06

## 2024-10-22 RX ADMIN — Medication 20 MILLIGRAM(S): at 06:07

## 2024-10-22 RX ADMIN — Medication 20 MILLIGRAM(S): at 21:49

## 2024-10-22 RX ADMIN — Medication 1 MILLIGRAM(S): at 06:06

## 2024-10-22 RX ADMIN — IPRATROPIUM BROMIDE 500 MICROGRAM(S): 0.5 SOLUTION RESPIRATORY (INHALATION) at 12:09

## 2024-10-22 RX ADMIN — Medication 50 MILLIGRAM(S): at 06:06

## 2024-10-22 RX ADMIN — RIVAROXABAN 20 MILLIGRAM(S): 20 TABLET, FILM COATED ORAL at 12:09

## 2024-10-22 RX ADMIN — Medication 650 MILLIGRAM(S): at 10:19

## 2024-10-22 RX ADMIN — Medication 650 MILLIGRAM(S): at 00:12

## 2024-10-22 RX ADMIN — BUDESONIDE 0.5 MILLIGRAM(S): 3 CAPSULE ORAL at 19:17

## 2024-10-22 RX ADMIN — Medication 5 MILLIGRAM(S): at 06:07

## 2024-10-22 RX ADMIN — MONTELUKAST SODIUM 10 MILLIGRAM(S): 10 TABLET, FILM COATED ORAL at 12:09

## 2024-10-22 RX ADMIN — BUDESONIDE 0.5 MILLIGRAM(S): 3 CAPSULE ORAL at 06:06

## 2024-10-22 RX ADMIN — Medication 20 MILLIGRAM(S): at 17:51

## 2024-10-22 RX ADMIN — Medication 50 MILLIGRAM(S): at 19:17

## 2024-10-22 RX ADMIN — IPRATROPIUM BROMIDE 500 MICROGRAM(S): 0.5 SOLUTION RESPIRATORY (INHALATION) at 19:17

## 2024-10-22 RX ADMIN — Medication 650 MILLIGRAM(S): at 09:49

## 2024-10-22 NOTE — PROGRESS NOTE ADULT - SUBJECTIVE AND OBJECTIVE BOX
[facility] Division of Hospital Medicine  Elvie Fraser MD  Available via MS Teams    SUBJECTIVE / OVERNIGHT EVENTS:  No acute events overnight. Denies CP, LH, Dizziness, abdominal sx. Blurry vision has been present for years. Difficulty seeing both near and far. Was seen by eye doctor outpatient who advised patient that she would need surgery. No recent changes to chronic vision concerns.    MEDICATIONS  (STANDING):  buDESOnide    Inhalation Suspension 0.5 milliGRAM(s) Inhalation two times a day  buMETAnide 1 milliGRAM(s) Oral daily  influenza   Vaccine 0.5 milliLiter(s) IntraMuscular once  ipratropium    for Nebulization 500 MICROGram(s) Nebulizer every 6 hours  montelukast 10 milliGRAM(s) Oral daily  NIFEdipine XL 30 milliGRAM(s) Oral daily  pantoprazole    Tablet 40 milliGRAM(s) Oral before breakfast  predniSONE   Tablet 5 milliGRAM(s) Oral daily  rivaroxaban 20 milliGRAM(s) Oral daily  sildenafil (REVATIO) 20 milliGRAM(s) Oral <User Schedule>  spironolactone 50 milliGRAM(s) Oral two times a day  Treprostinil SubQ Continous Infusion 51 NANOGram(s)/kG/Min 0.056 mL/Hr (0.06 mL/Hr) IV Continuous <Continuous>    MEDICATIONS  (PRN):  acetaminophen     Tablet .. 650 milliGRAM(s) Oral every 6 hours PRN Temp greater or equal to 38C (100.4F), Mild Pain (1 - 3)  acetaminophen 325 mG/butalbital 50 mG/caffeine 40 mG 1 Tablet(s) Oral every 8 hours PRN Mild Pain (1 - 3)  aluminum hydroxide/magnesium hydroxide/simethicone Suspension 30 milliLiter(s) Oral every 4 hours PRN Dyspepsia  hydrocortisone 1% Ointment 1 Application(s) Topical two times a day PRN Itching  SUMAtriptan 50 milliGRAM(s) Oral every 12 hours PRN Moderate to Severe Headache      I&O's Summary    21 Oct 2024 07:01  -  22 Oct 2024 07:00  --------------------------------------------------------  IN: 340 mL / OUT: 0 mL / NET: 340 mL    22 Oct 2024 07:01  -  22 Oct 2024 13:35  --------------------------------------------------------  IN: 360 mL / OUT: 0 mL / NET: 360 mL        PHYSICAL EXAM:  Vital Signs Last 24 Hrs  T(C): 38.1 (22 Oct 2024 10:44), Max: 38.1 (22 Oct 2024 10:44)  T(F): 100.6 (22 Oct 2024 10:44), Max: 100.6 (22 Oct 2024 10:44)  HR: 101 (22 Oct 2024 10:44) (96 - 101)  BP: 112/75 (22 Oct 2024 10:44) (106/65 - 133/85)  BP(mean): --  RR: 18 (22 Oct 2024 10:44) (18 - 18)  SpO2: 95% (22 Oct 2024 10:44) (95% - 99%)    Parameters below as of 22 Oct 2024 10:44  Patient On (Oxygen Delivery Method): room air      CONSTITUTIONAL: NAD  EYES: EOMI  ENMT: Moist oral mucosa  RESPIRATORY: Normal respiratory effort; scattered rhonchi  CARDIOVASCULAR: Regular rate and rhythm, no murmurs, no LE edema  ABDOMEN: Nontender to palpation, normoactive bowel sounds, no rebound/guarding  MUSCULOSKELETAL:  no joint swelling or tenderness to palpation  PSYCH: A+O to person, place, and time; affect appropriate  NEUROLOGY: strength and sensation grossly intact  SKIN: No rashes; no palpable lesions    LABS:                        13.5   11.05 )-----------( 305      ( 22 Oct 2024 09:03 )             44.2     10-22    132[L]  |  96  |  21  ----------------------------<  179[H]  3.6   |  22  |  1.12    Ca    8.6      22 Oct 2024 09:03  Phos  3.1     10-22  Mg     1.8     10-22      PTT - ( 22 Oct 2024 09:03 )  PTT:43.2 sec      Urinalysis Basic - ( 22 Oct 2024 09:03 )    Color: x / Appearance: x / SG: x / pH: x  Gluc: 179 mg/dL / Ketone: x  / Bili: x / Urobili: x   Blood: x / Protein: x / Nitrite: x   Leuk Esterase: x / RBC: x / WBC x   Sq Epi: x / Non Sq Epi: x / Bacteria: x        SARS-CoV-2: NotDetec (14 Oct 2024 13:58)      RADIOLOGY & ADDITIONAL TESTS:  New Imaging Personally Reviewed Today:  New Electrocardiogram Personally Reviewed Today:  Other Results Reviewed Today:   Prior or Outpatient Records Reviewed Today with Summary:    COORDINATION OF CARE:  Consultant Communication and Details of Discussion (where applicable):

## 2024-10-22 NOTE — PROGRESS NOTE ADULT - PROBLEM SELECTOR PLAN 1
-History of group 1 + 4 pHTN on Sildenafil and Remodulin   -pro-BNP elevated at 1861  -CXR with pulm edema  -TTE on 10/14 shows enlarged right ventricular cavity size and reduced right ventricular systolic function with evidence of right ventricular failure  -S/p RHC 10/18    Plan:  - Pulm recs appreciated  - C/w Sildenafil 20mg TID  - c/w Remodulin 51ng  - c/w bumex 1mg PO daily as pt euvolemic  - C/w Aldactone 50mg BID (increased from home 50 mg daily)  - c/w prednisone 5mg daily  - c/w procardia 30mg  - Continue O2 therapy with NC, baseline 2 L  - Encourage ambulation as tolerated

## 2024-10-22 NOTE — CHART NOTE - NSCHARTNOTEFT_GEN_A_CORE
Met with patient or approximately 40 minutes to educate and discuss consent for lung transplant evaluation. Reviewed evaluation process including testing, labs, appointments, and consults with the transplant team. Discussed surgical, medical, and psycho-social risks and benefits, selection process, [facility] waitlist information, and follow-up care. Discussed potential for EVLP, DCD donors, donors with PHS increased risk behaviors, including option of a hepatitis C lung.    Patient DEFERS Hepatitis C NAAT positive donor  Patient ACCEPTS Hepatitis B NAAT positive donor  Patient ACCEPTS PHS risk donors  Patient ACCEPTS DCD donors    The objectives of this evaluation were to educate each transplant patient about their rights, the process of evaluation, surgery and post-transplant care.  Specific topics discussed in this meeting included but were not limited to the following:    •             The evaluation/listing process, including the need for physical evaluation, laboratory and transplant specific diagnostic testing.  •             Patient selection criteria and suitability for transplantation.  •             The transplant surgical procedure/post-operative treatment.  •             Alternative treatments to transplant and organ donor risk factors.  •             Potential medical and/or psychosocial risks to transplant.  •             The importance of a strong and reliable support system throught the lung transplant process.    The patients received an electronic copy and paper copy of educational resources.  The patient and support person were encouraged to ask questions pertaining to any teaching they received at the evaluation which were then answered prior to the conclusion of the appointment. The patient was given information on how and when to contact the Lung Transplant Office to discuss their candidacy or listing status after their case was reviewed by the Selection Committee. The patient and support person were also encouraged to contact the Transplant Office at a later date should they have any further questions or issues.      Patient and caregivers questions were answered.    Patient signed consent and was supplied copies of consent, patient handbook, [facility] “Questions and Answers for transplant candidates regarding multiple listing and wait time transfer”, along with contact phone number if they have any additional questions or needs.    Patient is aware we are available should they or their family have any additional questions or concerns.

## 2024-10-22 NOTE — PROGRESS NOTE ADULT - PROBLEM SELECTOR PLAN 2
-History of RV failure iso pHTN, presented with worsening SOUZA, on exam with JVD  - pro-BNP elevated at 1861  - Home regimen: Aldactone 25mg QD, Torsemide 20mg QD  - s/p Torsemide 20mg PO + 1mg Bumex (10/14) in ED.   -  TTE on 10/14 shows enlarged right ventricular cavity size and reduced right ventricular systolic function with evidence of right ventricular failure. EF 70%.   - daily weights, strict I/Os  - S/p RHC 10/18  - on home torsemide 20 mg daily    Plan:  - HF following   - S/p Bumex 1mg IV BID - bumex 1mg PO daily  - C/w Aldactone 50mg BID  - Monitor BUN/Cr

## 2024-10-22 NOTE — CHART NOTE - NSCHARTNOTEFT_GEN_A_CORE
Spoke with primary team. No indication for palliative at this time. Please reconsult as needed. Can be reached by TEAMS M-F [phone] Margarita Wang Any other time please page [phone] if needed

## 2024-10-22 NOTE — PROGRESS NOTE ADULT - ASSESSMENT
44 yr old female with PMHx of PulmHTN class I/VI (dx 2014) on continuous Treprostinil (Remodulin) on home O2 (2L), c/b Rt heart failure s/p PPM   (Dual chamber 2016), Chronic P.E. on DOAC (dx 2017), SVT s/p ablation (5/2020), Asthma   (chronic steroid use - prednisone), JULIA who presented to the hospital with complaint of palpitations and dyspnea.    #pre lung transplant evaluation  - discussed amongst group and with patietn with plan to launch transplant evaluation   - noted barrier to transplant at this time is BMI >34 however patient is motivated and agreeable to all modalities of weight loss including chemical agents as discussed with endocrine team  - patient aware to be candidate for txp she must reach weight of 190lb, her max weight was 300 lb when first met with our program in 2022  - formal consent and education at bedside  - will obtain financial clearance and ordering appropriate testing  - plan to complete non invasive diagnostics first as discussed with primary team at bedside

## 2024-10-22 NOTE — PROGRESS NOTE ADULT - SUBJECTIVE AND OBJECTIVE BOX
Lung Transplant Progress Note  =====================================================  24 hour events: Pt seen and examined bedside, no new complaints at this time. Denies fever, chills, weakness, chest pain, palpitations, shortness of breath or worsening cough.    T(C): 36.7 (10-22-24 @ 04:38), Max: 37 (10-21-24 @ 12:11)  HR: 96 (10-22-24 @ 04:38) (96 - 101)  BP: 106/65 (10-22-24 @ 04:38) (106/65 - 133/85)  RR: 18 (10-22-24 @ 04:38) (18 - 18)  SpO2: 99% (10-22-24 @ 04:38) (99% - 99%)    PAST MEDICAL & SURGICAL HISTORY:  Tachycardia      Anemia      Pulmonary hypertension      Pulmonary embolism      Asthma  On home O2 nightly at 2L via NC      PE (pulmonary thromboembolism)  on Xarelto 10 mg daily      Sinusitis      Corneal disorder      Right heart failure      CAD (coronary artery disease)      H/O pulmonary hypertension      Pulmonary embolism      Asthma      History of tachycardia      On supplemental oxygen by nasal cannula  O2 @ 2L      Pacemaker      Skin mass  left upper thigh mass      History of tubal ligation      Pacemaker      H/O tubal ligation      History of pacemaker  12/19 - [employer] model Ingevity 4469        Home Meds: Home Medications:  Atrovent 18 mcg/inh inhalation aerosol: 1 inhaled 4 times a day as needed for  shortness of breath and/or wheezing (14 Oct 2024 05:41)  omeprazole 40 mg oral delayed release capsule: 1 cap(s) orally once a day (14 Oct 2024 05:42)  predniSONE 10 mg oral tablet: 1 tab(s) orally once a day (14 Oct 2024 05:42)  Remodulin 5 mg/mL injectable solution: 1 application injectable once a day patient is taking 48ng/kg/min via her own SQ PUMP (14 Oct 2024 05:42)  rivaroxaban 20 mg oral tablet: 1 tab(s) orally once a day (before a meal) (14 Oct 2024 05:42)  sildenafil 20 mg oral tablet: 1 tab(s) orally every 8 hours (14 Oct 2024 05:42)  spironolactone 25 mg oral tablet: 2 tab(s) orally once a day (14 Oct 2024 05:42)    Allergies: Allergies    adhesives (Rash)  vancomycin (Rash)  penicillin (Rash)    Intolerances        REVIEW OF SYSTEMS    [ ] A ten-point review of systems was otherwise negative except as noted.  [ ] Due to altered mental status/intubation, subjective information were not able to be obtained from the patient. History was obtained, to the extent possible, from review of the chart and collateral sources of information.      CURRENT MEDICATIONS:   Neurologic Medications  acetaminophen     Tablet .. 650 milliGRAM(s) Oral every 6 hours PRN Temp greater or equal to 38C (100.4F), Mild Pain (1 - 3)  acetaminophen 325 mG/butalbital 50 mG/caffeine 40 mG 1 Tablet(s) Oral every 8 hours PRN Mild Pain (1 - 3)  SUMAtriptan 50 milliGRAM(s) Oral every 12 hours PRN Moderate to Severe Headache    Respiratory Medications  buDESOnide    Inhalation Suspension 0.5 milliGRAM(s) Inhalation two times a day  ipratropium    for Nebulization 500 MICROGram(s) Nebulizer every 6 hours  montelukast 10 milliGRAM(s) Oral daily    Cardiovascular Medications  buMETAnide 1 milliGRAM(s) Oral daily  NIFEdipine XL 30 milliGRAM(s) Oral daily  sildenafil (REVATIO) 20 milliGRAM(s) Oral <User Schedule>  spironolactone 50 milliGRAM(s) Oral two times a day    Gastrointestinal Medications  aluminum hydroxide/magnesium hydroxide/simethicone Suspension 30 milliLiter(s) Oral every 4 hours PRN Dyspepsia  pantoprazole    Tablet 40 milliGRAM(s) Oral before breakfast    Genitourinary Medications    Hematologic/Oncologic Medications  influenza   Vaccine 0.5 milliLiter(s) IntraMuscular once  rivaroxaban 20 milliGRAM(s) Oral daily    Antimicrobial/Immunologic Medications    Endocrine/Metabolic Medications  predniSONE   Tablet 5 milliGRAM(s) Oral daily    Topical/Other Medications  hydrocortisone 1% Ointment 1 Application(s) Topical two times a day PRN Itching  Treprostinil SubQ Continous Infusion 51 NANOGram(s)/kG/Min 0.056 mL/Hr IV Continuous <Continuous>      INS/OUTS (last 24 hours):  I&O's Summary    20 Oct 2024 07:01  -  21 Oct 2024 07:00  --------------------------------------------------------  IN: 1241.3 mL / OUT: 1600 mL / NET: -358.7 mL    21 Oct 2024 07:01  -  22 Oct 2024 06:44  --------------------------------------------------------  IN: 340 mL / OUT: 0 mL / NET: 340 mL      --------------------------------------------------------------------------------------    PHYSICAL EXAM:  GENERAL: NAD, resting comfortably in bed  HEAD:  Atraumatic, normocephalic  EYES: EOMI, PERRLA, conjunctiva and sclera clear  NECK: Supple  CHEST/LUNG: CTA B/L, good inspiratory effort  HEART: RRR. +S1/S2  ABDOMEN: Soft, nondistended, nontender to palpation  EXTREMITIES: No clubbing, cyanosis, or edema  PSYCH: A&O x3  NEUROLOGY: Non-focal, motor & sensory grossly intact  SKIN: Warm & dry, no rashes or lesions    LABS:                        14.9   13.72 )-----------( 354      ( 21 Oct 2024 08:42 )             47.6     10-21    132[L]  |  95[L]  |  17  ----------------------------<  97  3.9   |  24  |  1.15    Ca    9.4      21 Oct 2024 08:42  Phos  2.9     10-21  Mg     2.0     10-21    TPro  7.6  /  Alb  4.3  /  TBili  0.8  /  DBili  x   /  AST  11  /  ALT  10  /  AlkPhos  63  10-20    PT/INR - ( 20 Oct 2024 10:09 )   PT: 15.5 sec;   INR: 1.36 ratio         PTT - ( 20 Oct 2024 10:09 )  PTT:41.9 sec  Urinalysis Basic - ( 21 Oct 2024 08:42 )    Color: x / Appearance: x / SG: x / pH: x  Gluc: 97 mg/dL / Ketone: x  / Bili: x / Urobili: x   Blood: x / Protein: x / Nitrite: x   Leuk Esterase: x / RBC: x / WBC x   Sq Epi: x / Non Sq Epi: x / Bacteria: x        CAPILLARY BLOOD GLUCOSE          RADIOLOGY:   Lung Transplant Progress Note  =====================================================  24 hour events: Pt seen and examined bedside, C/O HEADACHE     T(C): 36.7 (10-22-24 @ 04:38), Max: 37 (10-21-24 @ 12:11)  HR: 96 (10-22-24 @ 04:38) (96 - 101)  BP: 106/65 (10-22-24 @ 04:38) (106/65 - 133/85)  RR: 18 (10-22-24 @ 04:38) (18 - 18)  SpO2: 99% (10-22-24 @ 04:38) (99% - 99%)    PAST MEDICAL & SURGICAL HISTORY:  Tachycardia  Anemia  Pulmonary hypertension  Pulmonary embolism  Asthma  On home O2 nightly at 2L via NC  PE (pulmonary thromboembolism)  on Xarelto 10 mg daily  Sinusitis  Corneal disorder  Right heart failure  CAD (coronary artery disease)  H/O pulmonary hypertension  Pulmonary embolism  Asthma  History of tachycardia  On supplemental oxygen by nasal cannula  O2 @ 2L  Pacemaker  Skin mass  left upper thigh mass  History of tubal ligation  Pacemaker  H/O tubal ligation  History of pacemaker  12/19 - [place] [employer] model Ingevity 4469    Home Meds: Home Medications:  Atrovent 18 mcg/inh inhalation aerosol: 1 inhaled 4 times a day as needed for  shortness of breath and/or wheezing (14 Oct 2024 05:41)  omeprazole 40 mg oral delayed release capsule: 1 cap(s) orally once a day (14 Oct 2024 05:42)  predniSONE 10 mg oral tablet: 1 tab(s) orally once a day (14 Oct 2024 05:42)  Remodulin 5 mg/mL injectable solution: 1 application injectable once a day patient is taking 48ng/kg/min via her own SQ PUMP (14 Oct 2024 05:42)  rivaroxaban 20 mg oral tablet: 1 tab(s) orally once a day (before a meal) (14 Oct 2024 05:42)  sildenafil 20 mg oral tablet: 1 tab(s) orally every 8 hours (14 Oct 2024 05:42)  spironolactone 25 mg oral tablet: 2 tab(s) orally once a day (14 Oct 2024 05:42)    Allergies: Allergies    adhesives (Rash)  vancomycin (Rash)  penicillin (Rash)    Intolerances        REVIEW OF SYSTEMS    [x ] A ten-point review of systems was otherwise negative except as noted.  [ ] Due to altered mental status/intubation, subjective information were not able to be obtained from the patient. History was obtained, to the extent possible, from review of the chart and collateral sources of information.      CURRENT MEDICATIONS:   Neurologic Medications  acetaminophen     Tablet .. 650 milliGRAM(s) Oral every 6 hours PRN Temp greater or equal to 38C (100.4F), Mild Pain (1 - 3)  acetaminophen 325 mG/butalbital 50 mG/caffeine 40 mG 1 Tablet(s) Oral every 8 hours PRN Mild Pain (1 - 3)  SUMAtriptan 50 milliGRAM(s) Oral every 12 hours PRN Moderate to Severe Headache    Respiratory Medications  buDESOnide    Inhalation Suspension 0.5 milliGRAM(s) Inhalation two times a day  ipratropium    for Nebulization 500 MICROGram(s) Nebulizer every 6 hours  montelukast 10 milliGRAM(s) Oral daily    Cardiovascular Medications  buMETAnide 1 milliGRAM(s) Oral daily  NIFEdipine XL 30 milliGRAM(s) Oral daily  sildenafil (REVATIO) 20 milliGRAM(s) Oral <User Schedule>  spironolactone 50 milliGRAM(s) Oral two times a day    Gastrointestinal Medications  aluminum hydroxide/magnesium hydroxide/simethicone Suspension 30 milliLiter(s) Oral every 4 hours PRN Dyspepsia  pantoprazole    Tablet 40 milliGRAM(s) Oral before breakfast    Genitourinary Medications    Hematologic/Oncologic Medications  influenza   Vaccine 0.5 milliLiter(s) IntraMuscular once  rivaroxaban 20 milliGRAM(s) Oral daily    Antimicrobial/Immunologic Medications    Endocrine/Metabolic Medications  predniSONE   Tablet 5 milliGRAM(s) Oral daily    Topical/Other Medications  hydrocortisone 1% Ointment 1 Application(s) Topical two times a day PRN Itching  Treprostinil SubQ Continous Infusion 51 NANOGram(s)/kG/Min 0.056 mL/Hr IV Continuous <Continuous>      INS/OUTS (last 24 hours):  I&O's Summary    20 Oct 2024 07:01  -  21 Oct 2024 07:00  --------------------------------------------------------  IN: 1241.3 mL / OUT: 1600 mL / NET: -358.7 mL    21 Oct 2024 07:01  -  22 Oct 2024 06:44  --------------------------------------------------------  IN: 340 mL / OUT: 0 mL / NET: 340 mL      --------------------------------------------------------------------------------------    PHYSICAL EXAM:  GENERAL: NAD, resting comfortably in bed  HEAD:  Atraumatic, normocephalic  EYES: EOMI, PERRLA, conjunctiva and sclera clear  NECK: Supple  CHEST/LUNG: CTA B/L, good inspiratory effort  HEART: RRR. +S1/S2  ABDOMEN: Soft, nondistended, nontender to palpation  EXTREMITIES: No clubbing, cyanosis, or edema  PSYCH: A&O x3  NEUROLOGY: Non-focal, motor & sensory grossly intact  SKIN: Warm & dry, no rashes or lesions    LABS:                        14.9   13.72 )-----------( 354      ( 21 Oct 2024 08:42 )             47.6     10-21    132[L]  |  95[L]  |  17  ----------------------------<  97  3.9   |  24  |  1.15    Ca    9.4      21 Oct 2024 08:42  Phos  2.9     10-21  Mg     2.0     10-21    TPro  7.6  /  Alb  4.3  /  TBili  0.8  /  DBili  x   /  AST  11  /  ALT  10  /  AlkPhos  63  10-20    PT/INR - ( 20 Oct 2024 10:09 )   PT: 15.5 sec;   INR: 1.36 ratio         PTT - ( 20 Oct 2024 10:09 )  PTT:41.9 sec  Urinalysis Basic - ( 21 Oct 2024 08:42 )    Color: x / Appearance: x / SG: x / pH: x  Gluc: 97 mg/dL / Ketone: x  / Bili: x / Urobili: x   Blood: x / Protein: x / Nitrite: x   Leuk Esterase: x / RBC: x / WBC x   Sq Epi: x / Non Sq Epi: x / Bacteria: x

## 2024-10-22 NOTE — PROGRESS NOTE ADULT - PROBLEM SELECTOR PLAN 3
Imaging and laboratory testing for transplant eval ordered  - Lung TXP team following, f/u recs  - Palliative, dermatology, dental, psych and cardiology consulted  - D/w ophthalmology, no need for inpatient evaluation as sx are unchanged from prior and has been seen by outpatient providers for this Imaging and laboratory testing for transplant eval ordered  - Lung TXP team following, f/u recs  - Palliative, dermatology, dental, psych and consulted  - Will eventually need [facility]  - D/w ophthalmology, no need for inpatient evaluation as sx are unchanged from prior and has been seen by outpatient providers for this Imaging and laboratory testing for transplant eval ordered  - Lung TXP team following, f/u recs  - Palliative, dermatology, dental, psych and consulted  - Will eventually need [facility]  - D/w ophthalmology, no need for inpatient evaluation as sx are unchanged from prior and has been seen by outpatient providers for this  - Wegovy and zepbound are not covered by insurance, would be $1200/mo

## 2024-10-22 NOTE — CONSULT NOTE ADULT - SUBJECTIVE AND OBJECTIVE BOX
HPI:  44 year old woman with a history of pHTN (group I and IV on Rimodulin and Xarelto, on home 2L O2), c/b R heart failure, history of RA thrombus s/p thrombectomy, history of DVT/PE (2016), multiple abdominal wall abscesses (s/p I&D), asthma, bradycardia s/p dual chamber PPM. Patient presents with 2 days gradual onset palpitations and SOB on exertion, associated with midsternal chest tightness that is non-radiating. ROS additionally positive for epigastric pain. She states she usually had all these symptoms in the past for pHTN and asthma exacerbation. She endorse some baseline cough and sputum production but states it is not worse than usual. She denies fever, chills, dysuria, URI symptoms.    ED: T98.5,  -> 104, BP 120s/70s, 99% 4L. Labs notable for WBC 13.08, BNP 1861 (previously 840 7/2024). CT angio w/ patchy GGO (unchanged from previous). (14 Oct 2024 06:01)    Dermatology was consulted as part of lung transplant evaluation. Patient states that she has no personal or family history of skin cancer. Has a soft mobile mass on L anterior thigh that has been there for 3 years, asymptomatic. Has had evaluated but never removed         PAST MEDICAL & SURGICAL HISTORY:  Tachycardia      Anemia      Pulmonary hypertension      Pulmonary embolism      Asthma  On home O2 nightly at 2L via NC      PE (pulmonary thromboembolism)  on Xarelto 10 mg daily      Sinusitis      Corneal disorder      Right heart failure      CAD (coronary artery disease)      H/O pulmonary hypertension      Pulmonary embolism      Asthma      History of tachycardia      On supplemental oxygen by nasal cannula  O2 @ 2L      Pacemaker      Skin mass  left upper thigh mass      History of tubal ligation      Pacemaker      H/O tubal ligation      History of pacemaker  12/19 - [employer] model Ingevity 4469          Review of Systems: ____________________________________  REVIEW OF SYSTEMS      General: no fevers/chills, no lethary	    Skin/Breast: see HPI  	  Ophthalmologic: no eye pain or change in vision  	  ENMT: no dysphagia or change in hearing    Respiratory and Thorax: SOB on RA  	  Cardiovascular: no palpitations or chest pain    Gastrointestinal: no abdomenal pain or blood in stool     Genitourinary: no dysuria or frequency    Musculoskeletal: no joint pains or weakness	    Neurological:no weakness, numbness , or tingling    MEDICATIONS  (STANDING):  buDESOnide    Inhalation Suspension 0.5 milliGRAM(s) Inhalation two times a day  buMETAnide 1 milliGRAM(s) Oral daily  influenza   Vaccine 0.5 milliLiter(s) IntraMuscular once  ipratropium    for Nebulization 500 MICROGram(s) Nebulizer every 6 hours  montelukast 10 milliGRAM(s) Oral daily  NIFEdipine XL 30 milliGRAM(s) Oral daily  pantoprazole    Tablet 40 milliGRAM(s) Oral before breakfast  predniSONE   Tablet 5 milliGRAM(s) Oral daily  rivaroxaban 20 milliGRAM(s) Oral daily  sildenafil (REVATIO) 20 milliGRAM(s) Oral <User Schedule>  spironolactone 50 milliGRAM(s) Oral two times a day  Treprostinil SubQ Continous Infusion 51 NANOGram(s)/kG/Min 0.056 mL/Hr IV Continuous <Continuous>    ALLERGIES: adhesives  vancomycin  penicillin        SOCIAL HISTORY:  ____________________________________  Social History:  Never smoker  Former social drinker  No illicit drug use  , lives with  and son  used to work as [employer]  FAMILY HISTORY: ____________________________________  FAMILY HISTORY:  Family history of diabetes mellitus  in brother    Family history of diabetes mellitus in mother    FH: anemia          VITAL SIGNS LAST 24 HOURS:  T(F): 100.6 (10-22 @ 10:44), Max: 100.6 (10-22 @ 10:44)  HR: 101 (10-22 @ 10:44) (96 - 101)  BP: 112/75 (10-22 @ 10:44) (106/65 - 133/85)  RR: 18 (10-22 @ 10:44) (18 - 18)    ___________________________________  PHYSICAL EXAM:     The patient was alert and oriented X 3,  and in no  apparent distress.  OP showed no ulcerations  There was no visible lymphadenopathy.  Conjunctiva were non injected  There was no clubbing or edema of extremities.  The scalp, hair, face, eyebrows, lips, OP, neck, chest, back,   extremities X 4, nails were examined.  There was no hyperhidrosis or bromhidrosis.    Of note on skin exam: dark brown papules/ plaques w/o concerning features on dermoscopy. Brown stuck on plaque R mid back. L anterior thigh with soft mobile pedunculated mass         LABS:                        13.5   11.05 )-----------( 305      ( 22 Oct 2024 09:03 )             44.2     10-22    132[L]  |  96  |  21  ----------------------------<  179[H]  3.6   |  22  |  1.12    Ca    8.6      22 Oct 2024 09:03  Phos  3.1     10-22  Mg     1.8     10-22      PTT - ( 22 Oct 2024 09:03 )  PTT:43.2 sec  Urinalysis Basic - ( 22 Oct 2024 09:03 )    Color: x / Appearance: x / SG: x / pH: x  Gluc: 179 mg/dL / Ketone: x  / Bili: x / Urobili: x   Blood: x / Protein: x / Nitrite: x   Leuk Esterase: x / RBC: x / WBC x   Sq Epi: x / Non Sq Epi: x / Bacteria: x

## 2024-10-22 NOTE — CHART NOTE - NSCHARTNOTEFT_GEN_A_CORE
44 year old woman with a history of pHTN (group I and IV on Rimodulin and Xarelto, on home 2L O2), c/b R heart failure, history of RA thrombus s/p thrombectomy, history of DVT/PE (2016), multiple abdominal wall abscesses (s/p I&D), asthma, bradycardia s/p dual chamber PPM. Patient presents with 2 days gradual onset palpitations and SOB on exertion, associated with midsternal chest tightness that is non-radiating. ROS additionally positive for epigastric pain. She states she usually had all these symptoms in the past for pHTN and asthma exacerbation. She endorse some baseline cough and sputum production but states it is not worse than usual. She denies fever, chills, dysuria, URI symptoms.           ED: T98.5,  -> 104, BP 120s/70s, 99% 4L. Labs notable for WBC 13.08, BNP 1861 (previously 840 7/2024). CT angio w/ patchy GGO (unchanged from previous). (14 Oct 2024 06:01)           Dermatology was consulted as part of lung transplant evaluation. Patient away at imaging when came to evaluate. Dermatology to return tomorrow to evaluate     Angeli Rowell MD  Resident Physician, PGY3  [facility] Dermatology  Pager: [phone]  Office: [phone].

## 2024-10-22 NOTE — PHYSICAL THERAPY INITIAL EVALUATION ADULT - PERTINENT HX OF CURRENT PROBLEM, REHAB EVAL
44 year old woman with a history of pHTN (group I and IV on Rimodulin and Xarelto, on home 2L O2), c/b R heart failure, history of RA thrombus s/p thrombectomy, history of DVT/PE (2016), multiple abdominal wall abscesses (s/p I&D), asthma, bradycardia s/p dual chamber PPM. She presents with worsening palpitations and SOB on exertion concerning for pHTN exacerbation Pt is 44 year old woman with a history of pHTN (group I and IV on Rimodulin and Xarelto, on home 2L O2), c/b R heart failure, history of RA thrombus s/p thrombectomy, history of DVT/PE (2016), multiple abdominal wall abscesses (s/p I&D), asthma, bradycardia s/p dual chamber PPM. She presents with worsening palpitations and SOB on exertion concerning for pHTN exacerbation.   10/13 CXR - Clear lungs.   10/14 CT Angio Chest PE protocol - Mixing artifact in the main pulmonary arteries degrading evaluation. No evidence of acute pulmonary embolism within the confines of this exam. Patchy groundglass opacities in both lungs similar to that seen on prior exam. Nonspecific finding could represent pulmonary edema or may be infectious/inflammatory. Dilated pulmonary arteries in keeping with patient's known pulmonary   hypertension.  10/21 US transvaginal - Small 7 mm intramural fibroid.  Endometrium 5 mm thick.

## 2024-10-22 NOTE — PHYSICAL THERAPY INITIAL EVALUATION ADULT - ADDITIONAL COMMENTS
Pt states he lives with  and son on 4th floor apartment with elevator access and 5 steps to enter, has rolling walker, w/c and shower chair, uses 2L O2/min as needed.

## 2024-10-22 NOTE — PROGRESS NOTE ADULT - SUBJECTIVE AND OBJECTIVE BOX
Interval Events: Patient was seen and examined at bedside. No overnight events.    Patient complaining ot HA. is concerned that it maybe due to her CCB.   No other complaints today.     REVIEW OF SYSTEMS:  Constitutional: [- ] fevers [- ] chills [ ] weight loss [ ] weight gain  CV: [- ] chest pain [-] orthopnea [- ] palpitations [ ] murmur  Resp: [- ] cough [ -] shortness of breath [- ] dyspnea [ ] wheezing [ ] sputum [ ] hemoptysis  [ x] All other systems negative  [ ] Unable to assess ROS because ________    OBJECTIVE:  ICU Vital Signs Last 24 Hrs  T(C): 38.1 (22 Oct 2024 10:44), Max: 38.1 (22 Oct 2024 10:44)  T(F): 100.6 (22 Oct 2024 10:44), Max: 100.6 (22 Oct 2024 10:44)  HR: 101 (22 Oct 2024 10:44) (96 - 101)  BP: 112/75 (22 Oct 2024 10:44) (106/65 - 112/75)  BP(mean): --  ABP: --  ABP(mean): --  RR: 18 (22 Oct 2024 10:44) (18 - 18)  SpO2: 95% (22 Oct 2024 10:44) (95% - 99%)    O2 Parameters below as of 22 Oct 2024 10:44  Patient On (Oxygen Delivery Method): room air              10-21 @ 07:01  -  10-22 @ 07:00  --------------------------------------------------------  IN: 340 mL / OUT: 0 mL / NET: 340 mL    10-22 @ 07:01  -  10-22 @ 18:29  --------------------------------------------------------  IN: 720 mL / OUT: 0 mL / NET: 720 mL      CAPILLARY BLOOD GLUCOSE      PHYSICAL EXAM:    Constitutional: well-developed; well-groomed; well-nourished; no distress, Obese  Eyes: PERRL; EOMI  ENMT: Normal oropharnxy, no oral lesions, no erythema, no exudates  Neck:  Supple; no JVD; normal thyroid gland  Respiratory: airway patent; breath sounds equal; good air movement, no wheezing, no crackles, no rhonchi. no increase in WOB  Cardiovascular: regular rate and rhythm  no rub , no murmur, no gallops.   Gastrointestinal: soft; no distention, normal BS, no TTP, no organomegaly, no ascites.  Extremities: no clubbing; no cyanosis; no pedal edema,   Neurological: alert and oriented x 3; Skin: warm and dry; color normal: no rash: no ulcers  Psychiatric: Calm, no SI/HI      HOSPITAL MEDICATIONS:  MEDICATIONS  (STANDING):  buDESOnide    Inhalation Suspension 0.5 milliGRAM(s) Inhalation two times a day  buMETAnide 1 milliGRAM(s) Oral daily  influenza   Vaccine 0.5 milliLiter(s) IntraMuscular once  ipratropium    for Nebulization 500 MICROGram(s) Nebulizer every 6 hours  montelukast 10 milliGRAM(s) Oral daily  NIFEdipine XL 30 milliGRAM(s) Oral daily  pantoprazole    Tablet 40 milliGRAM(s) Oral before breakfast  predniSONE   Tablet 5 milliGRAM(s) Oral daily  rivaroxaban 20 milliGRAM(s) Oral daily  sildenafil (REVATIO) 20 milliGRAM(s) Oral <User Schedule>  spironolactone 50 milliGRAM(s) Oral two times a day  Treprostinil SubQ Continous Infusion 51 NANOGram(s)/kG/Min 0.056 mL/Hr (0.06 mL/Hr) IV Continuous <Continuous>    MEDICATIONS  (PRN):  acetaminophen     Tablet .. 650 milliGRAM(s) Oral every 6 hours PRN Temp greater or equal to 38C (100.4F), Mild Pain (1 - 3)  acetaminophen 325 mG/butalbital 50 mG/caffeine 40 mG 1 Tablet(s) Oral every 8 hours PRN Mild Pain (1 - 3)  aluminum hydroxide/magnesium hydroxide/simethicone Suspension 30 milliLiter(s) Oral every 4 hours PRN Dyspepsia  hydrocortisone 1% Ointment 1 Application(s) Topical two times a day PRN Itching  SUMAtriptan 50 milliGRAM(s) Oral every 12 hours PRN Moderate to Severe Headache      LABS:                        13.5   11.05 )-----------( 305      ( 22 Oct 2024 09:03 )             44.2     Hgb Trend: 13.5<--, 14.9<--, 14.3<--, 13.8<--, 15.1<--  10-22    133[L]  |  97  |  21  ----------------------------<  81  4.3   |  23  |  1.10    Ca    8.8      22 Oct 2024 15:07  Phos  3.3     10-22  Mg     2.0     10-22    TPro  7.7  /  Alb  4.4  /  TBili  0.8  /  DBili  0.2  /  AST  13  /  ALT  15  /  AlkPhos  65  10-22    Creatinine Trend: 1.10<--, 1.12<--, 1.15<--, 1.23<--, 1.03<--, 0.95<--  PT/INR - ( 22 Oct 2024 15:07 )   PT: 23.9 sec;   INR: 2.11 ratio         PTT - ( 22 Oct 2024 15:07 )  PTT:48.6 sec  Urinalysis Basic - ( 22 Oct 2024 15:07 )    Color: x / Appearance: x / SG: x / pH: x  Gluc: 81 mg/dL / Ketone: x  / Bili: x / Urobili: x   Blood: x / Protein: x / Nitrite: x   Leuk Esterase: x / RBC: x / WBC x   Sq Epi: x / Non Sq Epi: x / Bacteria: x          MICROBIOLOGY:     RADIOLOGY & EKG:  [x ] Reviewed and interpreted by me

## 2024-10-22 NOTE — PROGRESS NOTE ADULT - PROBLEM SELECTOR PLAN 7
[de-identified] : Ear tugging, trouble sleeping [FreeTextEntry6] : Reports ear tugging and trouble sleeping no fevers or URI symptoms Chronic Bradycardia s/p PPM. Presenting with palpitations.  Thyroid studies wnl.  - S/p PPM interrogation on 10/14 - showed SVT  - EP consult noted, no plans to adjust VT detection rate, signed off   - Telemetry monitoring

## 2024-10-22 NOTE — PROGRESS NOTE ADULT - PROBLEM SELECTOR PLAN 5
-resolved on 10/21  -noted with clots on 10/20, no cramping  -vaginal US 10/21 with small fibroid, 5mm endometrium

## 2024-10-22 NOTE — CONSULT NOTE ADULT - ASSESSMENT
NOTE IS INCOMPLETE. PLEASE CHECK BACK LATER FOR FINAL ASSESSMENT AND RECOMMENDATIONS.      #Full body skin examination (FBE) to assess for cutaneous malignancy  - There were no lesions clinically concerning for malignancy  - Due to inadequate lighting in the hospital setting and limitations with ambulation/movement, subtle skin cancers may be missed during skin cancer screening performed in the hospital.      The patient's chart was reviewed in addition to being seen and examined at bedside with the dermatology attending Dr. Peralta .  Recommendations were communicated with the primary team.  Please page [phone] with a [phone] call-back [phone] for further related questions.    _______________ Thank you for allowing us to participate in the care of this patient.  Please alert Dermatology for any new or worsening developments.    Angeli Rowell MD  Resident Physician, PGY-2  [facility] Dermatology  Pager: [phone]  Office: [phone]

## 2024-10-22 NOTE — PROGRESS NOTE ADULT - ASSESSMENT
45 yo F w/ PMHx PHTN (group I and IV) currently on remodulin and xarelto, chronic hypoxemic respiratory failure on 2L home O2, RA thrombus s/p thrombectomy, Asthma (Pred 10mg), prior AVRNT s/p ablation, bradycardia s/p dual chamber PPM, and hx of abdominal wall abscesses presenting for increased dyspnea and palpitations with HR into 150s. CTPA negative for PE but showing bilateral GGOs. ED POCUS with IVC >2cm, resp variation noted, severe RV dilation. BNP elevated to 1800, prev 800 on recent admission.  No wheezing on exam.  Overall findings consistent with HF exacerbation. Echo with RV dilation, mildly reduced RV function. EP investigated PPM but no arrhythmias noted?  RHC/LHC findings noted, notably some NO response with significant improvement in CI/CO, mPAP 56 to 43, reduced PVR from 11.2 MONACO to 6.3 MONACO. RA 3 and PCWP 9 c/w euvolemia.    # Severe pHTN, group 1 and IV  # Obesity  # SVT  - Severe pHTN, group 1 diease. S/p RHC with some Marcela response.   - C/W Sildenafil, remodulin at current dose.   - C/w pulmicort, and atrovent. Not on KEE 2/2 to hx of SVT/tachycardia  - C/W full a/c   - on Bumex 1mg PO daily, aldactone  - supplemental O2 as needed for sat >93 to prevent hypoxemic vasoconstriction.   - C/W pred 5mg  - D/W lung transplant team. May need inpatient work up. Would get cMRI to assess for RV function.   - ndocrine consult in AM for weight loss, Wegocy vs Ozempic to assist with weight loss for transplant. Current BMI 36.6  - C/W her CCB given improvement in her pulmonary pressure. Try moving to noon time to see if it is associated with her headaches   - Pulmonary will follow.

## 2024-10-23 DIAGNOSIS — R50.9 FEVER, UNSPECIFIED: ICD-10-CM

## 2024-10-23 LAB
A1C WITH ESTIMATED AVERAGE GLUCOSE RESULT: 5.5 % — SIGNIFICANT CHANGE UP (ref 4–5.6)
AMPHET UR-MCNC: NEGATIVE NG/ML — SIGNIFICANT CHANGE UP
AMPHET UR-MCNC: NEGATIVE — SIGNIFICANT CHANGE UP
ANION GAP SERPL CALC-SCNC: 13 MMOL/L — SIGNIFICANT CHANGE UP (ref 5–17)
ANTI-RIBONUCLEAR PROTEIN: 0.2 AI — SIGNIFICANT CHANGE UP
AT III ACT/NOR PPP CHRO: 139 % — HIGH (ref 85–135)
AUTO DIFF PNL BLD: NEGATIVE — SIGNIFICANT CHANGE UP
B2 GLYCOPROT1 IGA SER QL: <2 U/ML — SIGNIFICANT CHANGE UP
B2 GLYCOPROT1 IGG SER-ACNC: <1.4 U/ML — SIGNIFICANT CHANGE UP
B2 GLYCOPROT1 IGM SER-ACNC: <1.5 U/ML — SIGNIFICANT CHANGE UP
BARBITURATES UR QL SCN: NEGATIVE NG/ML — SIGNIFICANT CHANGE UP
BARBITURATES UR SCN-MCNC: NEGATIVE — SIGNIFICANT CHANGE UP
BARBITURATES UR-MCNC: NEGATIVE NG/ML — SIGNIFICANT CHANGE UP
BASOPHILS # BLD AUTO: 0.08 K/UL — SIGNIFICANT CHANGE UP (ref 0–0.2)
BASOPHILS NFR BLD AUTO: 0.6 % — SIGNIFICANT CHANGE UP (ref 0–2)
BENZODIAZ UR-MCNC: NEGATIVE NG/ML — SIGNIFICANT CHANGE UP
BENZODIAZ UR-MCNC: NEGATIVE — SIGNIFICANT CHANGE UP
BUN SERPL-MCNC: 18 MG/DL — SIGNIFICANT CHANGE UP (ref 7–23)
C-ANCA SER-ACNC: NEGATIVE — SIGNIFICANT CHANGE UP
C3 SERPL-MCNC: 133 MG/DL — SIGNIFICANT CHANGE UP (ref 81–157)
C4 SERPL-MCNC: 39 MG/DL — SIGNIFICANT CHANGE UP (ref 13–39)
CALCIUM SERPL-MCNC: 8.9 MG/DL — SIGNIFICANT CHANGE UP (ref 8.4–10.5)
CARDIOLIPIN IGM SER-MCNC: <1.5 MPL U/ML — SIGNIFICANT CHANGE UP
CARDIOLIPIN IGM SER-MCNC: <1.6 GPL U/ML — SIGNIFICANT CHANGE UP
CENTROMERE AB SER-ACNC: <0.2 AI — SIGNIFICANT CHANGE UP
CHLORIDE SERPL-SCNC: 99 MMOL/L — SIGNIFICANT CHANGE UP (ref 96–108)
CO2 SERPL-SCNC: 22 MMOL/L — SIGNIFICANT CHANGE UP (ref 22–31)
COCAINE METAB.OTHER UR-MCNC: NEGATIVE NG/ML — SIGNIFICANT CHANGE UP
COCAINE METAB.OTHER UR-MCNC: NEGATIVE — SIGNIFICANT CHANGE UP
CREAT SERPL-MCNC: 1.07 MG/DL — SIGNIFICANT CHANGE UP (ref 0.5–1.3)
CREATININE, URINE THERAPEUTIC: 94.5 MG/DL — SIGNIFICANT CHANGE UP (ref 20–300)
DEPRECATED CARDIOLIPIN IGA SER: <2 APL U/ML — SIGNIFICANT CHANGE UP
DRVVT RATIO: 1.4 RATIO — HIGH (ref 0–1.21)
DRVVT SCREEN TO CONFIRM RATIO: SIGNIFICANT CHANGE UP
DSDNA AB FLD-ACNC: <0.2 AI — SIGNIFICANT CHANGE UP
DSDNA AB SER-ACNC: <1 IU/ML — SIGNIFICANT CHANGE UP
EGFR: 66 ML/MIN/1.73M2 — SIGNIFICANT CHANGE UP
ENA JO1 AB SER-ACNC: <0.2 AI — SIGNIFICANT CHANGE UP
ENA SCL70 AB SER-ACNC: <0.2 AI — SIGNIFICANT CHANGE UP
ENA SM AB FLD QL: <0.2 AI — SIGNIFICANT CHANGE UP
ENA SS-A AB FLD IA-ACNC: <0.2 AI — SIGNIFICANT CHANGE UP
EOSINOPHIL # BLD AUTO: 0.18 K/UL — SIGNIFICANT CHANGE UP (ref 0–0.5)
EOSINOPHIL NFR BLD AUTO: 1.2 % — SIGNIFICANT CHANGE UP (ref 0–6)
ESTIMATED AVERAGE GLUCOSE: 111 MG/DL — SIGNIFICANT CHANGE UP (ref 68–114)
FENTANYL UR QL SCN: NEGATIVE NG/ML — SIGNIFICANT CHANGE UP
FLUAV AG NPH QL: SIGNIFICANT CHANGE UP
FLUBV AG NPH QL: SIGNIFICANT CHANGE UP
GLUCOSE SERPL-MCNC: 101 MG/DL — HIGH (ref 70–99)
HAV IGG SER QL IA: SIGNIFICANT CHANGE UP
HAV IGM SER-ACNC: SIGNIFICANT CHANGE UP
HBV CORE AB SER-ACNC: SIGNIFICANT CHANGE UP
HBV SURFACE AB SER-ACNC: <3 MIU/ML — LOW
HCT VFR BLD CALC: 44.2 % — SIGNIFICANT CHANGE UP (ref 34.5–45)
HCV RNA SPEC NAA+PROBE-LOG IU: SIGNIFICANT CHANGE UP
HCV RNA SPEC NAA+PROBE-LOG IU: SIGNIFICANT CHANGE UP LOGIU/ML
HEV AB FLD QL: NEGATIVE — SIGNIFICANT CHANGE UP
HGB BLD-MCNC: 13.7 G/DL — SIGNIFICANT CHANGE UP (ref 11.5–15.5)
HIV 1+2 AB+HIV1 P24 AG SERPL QL IA: SIGNIFICANT CHANGE UP
HIV-1 VIRAL LOAD RESULT: SIGNIFICANT CHANGE UP
HIV1 RNA # SERPL NAA+PROBE: SIGNIFICANT CHANGE UP COPIES/ML
HIV1 RNA SER-IMP: SIGNIFICANT CHANGE UP
HIV1 RNA SERPL NAA+PROBE-ACNC: SIGNIFICANT CHANGE UP
HIV1 RNA SERPL NAA+PROBE-LOG#: SIGNIFICANT CHANGE UP LG COP/ML
IGA FLD-MCNC: 154 MG/DL — SIGNIFICANT CHANGE UP (ref 84–499)
IGG FLD-MCNC: 1330 MG/DL — SIGNIFICANT CHANGE UP (ref 610–1660)
IGM SERPL-MCNC: 134 MG/DL — SIGNIFICANT CHANGE UP (ref 35–242)
IMM GRANULOCYTES NFR BLD AUTO: 0.7 % — SIGNIFICANT CHANGE UP (ref 0–0.9)
KAPPA LC SER QL IFE: 1.63 MG/DL — SIGNIFICANT CHANGE UP (ref 0.33–1.94)
KAPPA/LAMBDA FREE LIGHT CHAIN RATIO, SERUM: 2.01 RATIO — HIGH (ref 0.26–1.65)
LAMBDA LC SER QL IFE: 0.81 MG/DL — SIGNIFICANT CHANGE UP (ref 0.57–2.63)
LYMPHOCYTES # BLD AUTO: 1.65 K/UL — SIGNIFICANT CHANGE UP (ref 1–3.3)
LYMPHOCYTES # BLD AUTO: 11.4 % — LOW (ref 13–44)
MAGNESIUM SERPL-MCNC: 1.9 MG/DL — SIGNIFICANT CHANGE UP (ref 1.6–2.6)
MCHC RBC-ENTMCNC: 21.1 PG — LOW (ref 27–34)
MCHC RBC-ENTMCNC: 31 GM/DL — LOW (ref 32–36)
MCV RBC AUTO: 68.2 FL — LOW (ref 80–100)
METHADONE UR QL SCN: NEGATIVE NG/ML — SIGNIFICANT CHANGE UP
METHADONE UR-MCNC: NEGATIVE — SIGNIFICANT CHANGE UP
MONOCYTES # BLD AUTO: 0.7 K/UL — SIGNIFICANT CHANGE UP (ref 0–0.9)
MONOCYTES NFR BLD AUTO: 4.8 % — SIGNIFICANT CHANGE UP (ref 2–14)
MPO AB + PR3 PNL SER: SIGNIFICANT CHANGE UP
NEUTROPHILS # BLD AUTO: 11.79 K/UL — HIGH (ref 1.8–7.4)
NEUTROPHILS NFR BLD AUTO: 81.3 % — HIGH (ref 43–77)
NRBC # BLD: 0 /100 WBCS — SIGNIFICANT CHANGE UP (ref 0–0)
OPIATES UR-MCNC: NEGATIVE NG/ML — SIGNIFICANT CHANGE UP
OPIATES UR-MCNC: NEGATIVE — SIGNIFICANT CHANGE UP
OXYCODONE UR QL SCN: NEGATIVE NG/ML — SIGNIFICANT CHANGE UP
OXYCODONE UR-MCNC: NEGATIVE — SIGNIFICANT CHANGE UP
P-ANCA SER-ACNC: NEGATIVE — SIGNIFICANT CHANGE UP
PCP SPEC-MCNC: SIGNIFICANT CHANGE UP
PCP UR-MCNC: NEGATIVE NG/ML — SIGNIFICANT CHANGE UP
PCP UR-MCNC: NEGATIVE — SIGNIFICANT CHANGE UP
PH, URINE RESULT: 5.9 — SIGNIFICANT CHANGE UP (ref 4.5–8.9)
PLATELET # BLD AUTO: 337 K/UL — SIGNIFICANT CHANGE UP (ref 150–400)
POTASSIUM SERPL-MCNC: 4.1 MMOL/L — SIGNIFICANT CHANGE UP (ref 3.5–5.3)
POTASSIUM SERPL-SCNC: 4.1 MMOL/L — SIGNIFICANT CHANGE UP (ref 3.5–5.3)
PROT C ACT/NOR PPP: 118 % — SIGNIFICANT CHANGE UP (ref 74–150)
RAPID RVP RESULT: SIGNIFICANT CHANGE UP
RBC # BLD: 6.48 M/UL — HIGH (ref 3.8–5.2)
RBC # FLD: 19.9 % — HIGH (ref 10.3–14.5)
RSV RNA NPH QL NAA+NON-PROBE: SIGNIFICANT CHANGE UP
SARS-COV-2 RNA SPEC QL NAA+PROBE: SIGNIFICANT CHANGE UP
SARS-COV-2 RNA SPEC QL NAA+PROBE: SIGNIFICANT CHANGE UP
SODIUM SERPL-SCNC: 134 MMOL/L — LOW (ref 135–145)
THC UR QL: NEGATIVE NG/ML — SIGNIFICANT CHANGE UP
THC UR QL: NEGATIVE — SIGNIFICANT CHANGE UP
WBC # BLD: 14.5 K/UL — HIGH (ref 3.8–10.5)
WBC # FLD AUTO: 14.5 K/UL — HIGH (ref 3.8–10.5)

## 2024-10-23 PROCEDURE — 99223 1ST HOSP IP/OBS HIGH 75: CPT | Mod: GC

## 2024-10-23 PROCEDURE — 99232 SBSQ HOSP IP/OBS MODERATE 35: CPT

## 2024-10-23 PROCEDURE — G0452: CPT | Mod: 26

## 2024-10-23 PROCEDURE — 74176 CT ABD & PELVIS W/O CONTRAST: CPT | Mod: 26

## 2024-10-23 PROCEDURE — 99222 1ST HOSP IP/OBS MODERATE 55: CPT

## 2024-10-23 PROCEDURE — 70450 CT HEAD/BRAIN W/O DYE: CPT | Mod: 26

## 2024-10-23 PROCEDURE — 71250 CT THORAX DX C-: CPT | Mod: 26

## 2024-10-23 PROCEDURE — 93010 ELECTROCARDIOGRAM REPORT: CPT

## 2024-10-23 PROCEDURE — 99223 1ST HOSP IP/OBS HIGH 75: CPT

## 2024-10-23 PROCEDURE — 99233 SBSQ HOSP IP/OBS HIGH 50: CPT

## 2024-10-23 RX ORDER — MUPIROCIN 20 MG/G
1 OINTMENT TOPICAL
Refills: 0 | Status: COMPLETED | OUTPATIENT
Start: 2024-10-23 | End: 2024-10-27

## 2024-10-23 RX ORDER — TRAZODONE HYDROCHLORIDE 100 MG/1
25 TABLET ORAL AT BEDTIME
Refills: 0 | Status: DISCONTINUED | OUTPATIENT
Start: 2024-10-23 | End: 2024-10-25

## 2024-10-23 RX ORDER — HEPARIN SODIUM 10000 [USP'U]/ML
INJECTION INTRAVENOUS; SUBCUTANEOUS
Qty: 25000 | Refills: 0 | Status: DISCONTINUED | OUTPATIENT
Start: 2024-10-23 | End: 2024-10-24

## 2024-10-23 RX ORDER — HEPARIN SODIUM 10000 [USP'U]/ML
8000 INJECTION INTRAVENOUS; SUBCUTANEOUS EVERY 6 HOURS
Refills: 0 | Status: DISCONTINUED | OUTPATIENT
Start: 2024-10-23 | End: 2024-10-24

## 2024-10-23 RX ORDER — ALPRAZOLAM 0.25 MG
0.5 TABLET ORAL ONCE
Refills: 0 | Status: DISCONTINUED | OUTPATIENT
Start: 2024-10-23 | End: 2024-10-23

## 2024-10-23 RX ORDER — HEPARIN SODIUM 10000 [USP'U]/ML
4000 INJECTION INTRAVENOUS; SUBCUTANEOUS EVERY 6 HOURS
Refills: 0 | Status: DISCONTINUED | OUTPATIENT
Start: 2024-10-23 | End: 2024-10-24

## 2024-10-23 RX ADMIN — TRAZODONE HYDROCHLORIDE 25 MILLIGRAM(S): 100 TABLET ORAL at 21:36

## 2024-10-23 RX ADMIN — Medication 650 MILLIGRAM(S): at 07:00

## 2024-10-23 RX ADMIN — Medication 20 MILLIGRAM(S): at 07:30

## 2024-10-23 RX ADMIN — Medication 1 MILLIGRAM(S): at 04:46

## 2024-10-23 RX ADMIN — Medication 20 MILLIGRAM(S): at 15:28

## 2024-10-23 RX ADMIN — Medication 0.5 MILLIGRAM(S): at 10:56

## 2024-10-23 RX ADMIN — Medication 50 MILLIGRAM(S): at 16:32

## 2024-10-23 RX ADMIN — MUPIROCIN 1 APPLICATION(S): 20 OINTMENT TOPICAL at 05:36

## 2024-10-23 RX ADMIN — Medication 50 MILLIGRAM(S): at 04:46

## 2024-10-23 RX ADMIN — RIVAROXABAN 20 MILLIGRAM(S): 20 TABLET, FILM COATED ORAL at 13:33

## 2024-10-23 RX ADMIN — IPRATROPIUM BROMIDE 500 MICROGRAM(S): 0.5 SOLUTION RESPIRATORY (INHALATION) at 16:32

## 2024-10-23 RX ADMIN — HEPARIN SODIUM 1800 UNIT(S)/HR: 10000 INJECTION INTRAVENOUS; SUBCUTANEOUS at 18:31

## 2024-10-23 RX ADMIN — IPRATROPIUM BROMIDE 500 MICROGRAM(S): 0.5 SOLUTION RESPIRATORY (INHALATION) at 13:32

## 2024-10-23 RX ADMIN — MONTELUKAST SODIUM 10 MILLIGRAM(S): 10 TABLET, FILM COATED ORAL at 13:33

## 2024-10-23 RX ADMIN — BUDESONIDE 0.5 MILLIGRAM(S): 3 CAPSULE ORAL at 16:32

## 2024-10-23 RX ADMIN — Medication 650 MILLIGRAM(S): at 06:12

## 2024-10-23 RX ADMIN — Medication 5 MILLIGRAM(S): at 04:46

## 2024-10-23 RX ADMIN — IPRATROPIUM BROMIDE 500 MICROGRAM(S): 0.5 SOLUTION RESPIRATORY (INHALATION) at 02:44

## 2024-10-23 RX ADMIN — PANTOPRAZOLE SODIUM 40 MILLIGRAM(S): 40 TABLET, DELAYED RELEASE ORAL at 04:47

## 2024-10-23 RX ADMIN — MUPIROCIN 1 APPLICATION(S): 20 OINTMENT TOPICAL at 16:35

## 2024-10-23 RX ADMIN — Medication 20 MILLIGRAM(S): at 21:39

## 2024-10-23 RX ADMIN — Medication 30 MILLIGRAM(S): at 13:32

## 2024-10-23 RX ADMIN — IPRATROPIUM BROMIDE 500 MICROGRAM(S): 0.5 SOLUTION RESPIRATORY (INHALATION) at 23:08

## 2024-10-23 NOTE — BH CONSULTATION LIAISON ASSESSMENT NOTE - NSICDXPASTSURGICALHX_GEN_ALL_CORE_FT
PAST SURGICAL HISTORY:  H/O tubal ligation     History of pacemaker 12/19 - [employer] model Ingevity [id number]    History of tubal ligation     Pacemaker

## 2024-10-23 NOTE — PROGRESS NOTE ADULT - ASSESSMENT
44 yr old female with PMHx of PulmHTN class I/VI (dx 2014) on continuous Treprostinil (Remodulin) on home O2 (2L), c/b Rt heart failure s/p PPM (Dual chamber 2016), Chronic P.E. on DOAC (dx 2017), SVT s/p ablation (5/2020), Asthma (chronic steroid use - prednisone), JULIA who presented to the hospital with complaint of palpitations and dyspnea.    #pre lung transplant evaluation  - Consented for lung transplant eval 10/22/24 - defer Hep C donors for now  - Noted barrier to transplant at this time is BMI >34 however patient is motivated and agreeable to all modalities of weight loss including chemical agents as discussed with endocrine team  - patient aware to be candidate for txp she must reach weight of 190lb, her max weight was 300 lb when first met with our program in 2022  - Financial clearance pending [ ]  - Plan to complete non invasive diagnostics first as discussed with primary team at bedside     - Cardiac MRI [ ]     - V/Q Scan [x]     - CT C/A/P [x]     - MRCP (pancreatic ductal dilation) [ ]  44 yr old female with PMHx of PulmHTN class I/VI (dx 2014) on continuous Treprostinil (Remodulin) on home O2 (2L), c/b Rt heart failure s/p PPM (Dual chamber 2016), Chronic P.E. on DOAC (dx 2017), SVT s/p ablation (5/2020), Asthma (chronic steroid use - prednisone), JULIA who presented to the hospital with complaint of palpitations and dyspnea.    #pre lung transplant evaluation  - Consented for lung transplant eval 10/22/24 - defer Hep C donors for now  - Noted barrier to transplant at this time is BMI >34 however patient is motivated and agreeable to all modalities of weight loss including chemical agents as discussed with endocrine team  - patient aware to be candidate for txp she must reach weight of 190lb, her max weight was 300 lb when first met with our program in 2022  - Financial clearance obtained 10/22 [x]  - Plan to complete non invasive diagnostics first as discussed with primary team at bedside     - Cardiac MRI [ ]     - V/Q Scan [x]     - CT C/A/P [x]     - MRCP (pancreatic ductal dilation) [ ]    - Will need dental and dermatology consults for clearance [ ] 44 yr old female with PMHx of PulmHTN class I/VI (dx 2014) on continuous Treprostinil (Remodulin) on home O2 (2L), c/b Rt heart failure s/p PPM (Dual chamber 2016), Chronic P.E. on DOAC (dx 2017), SVT s/p ablation (5/2020), Asthma (chronic steroid use - prednisone), JULIA who presented to the hospital with complaint of palpitations and dyspnea.    #pre lung transplant evaluation  - Consented for lung transplant eval 10/22/24 - defer Hep C donors for now  - Noted barrier to transplant at this time is BMI >34 however patient is motivated and agreeable to all modalities of weight loss including chemical agents as discussed with endocrine team  - patient aware to be candidate for txp she must reach weight of 190lb, her max weight was 300 lb when first met with our program in 2022  - Financial clearance obtained 10/22 [x]  - Plan to complete non invasive diagnostics first as discussed with primary team at bedside     - Cardiac MRI [ ]     - V/Q Scan [x]     - CT C/A/P [x]     - MRCP (pancreatic ductal dilation) [ ]    - Will need dental and dermatology consults for clearance [ ]  - Plan for EGD/EUS w/ GI on Friday 10/25/24 - optimized from a pulmonary standpoint however, pt will require cardiac anesthesia due to hx of pulm HTN.     - Please hold AC

## 2024-10-23 NOTE — PROGRESS NOTE ADULT - SUBJECTIVE AND OBJECTIVE BOX
[facility] Division of Hospital Medicine  Elvie Fraser MD  Available via MS Teams    SUBJECTIVE / OVERNIGHT EVENTS:  Tm 100.6 yesterday PM noted in chart, no provider contact noted. Pt denies being told she had a fever. Unclear if documentation error? Pt denies feeling feverish. No new sx.     MEDICATIONS  (STANDING):  buDESOnide    Inhalation Suspension 0.5 milliGRAM(s) Inhalation two times a day  buMETAnide 1 milliGRAM(s) Oral daily  influenza   Vaccine 0.5 milliLiter(s) IntraMuscular once  ipratropium    for Nebulization 500 MICROGram(s) Nebulizer every 6 hours  montelukast 10 milliGRAM(s) Oral daily  mupirocin 2% Nasal 1 Application(s) Both Nostrils two times a day  NIFEdipine XL 30 milliGRAM(s) Oral <User Schedule>  pantoprazole    Tablet 40 milliGRAM(s) Oral before breakfast  predniSONE   Tablet 5 milliGRAM(s) Oral daily  rivaroxaban 20 milliGRAM(s) Oral daily  sildenafil (REVATIO) 20 milliGRAM(s) Oral <User Schedule>  spironolactone 50 milliGRAM(s) Oral two times a day  Treprostinil SubQ Continous Infusion 51 NANOGram(s)/kG/Min 0.056 mL/Hr (0.06 mL/Hr) IV Continuous <Continuous>    MEDICATIONS  (PRN):  acetaminophen     Tablet .. 650 milliGRAM(s) Oral every 6 hours PRN Temp greater or equal to 38C (100.4F), Mild Pain (1 - 3)  acetaminophen 325 mG/butalbital 50 mG/caffeine 40 mG 1 Tablet(s) Oral every 8 hours PRN Mild Pain (1 - 3)  aluminum hydroxide/magnesium hydroxide/simethicone Suspension 30 milliLiter(s) Oral every 4 hours PRN Dyspepsia  hydrocortisone 1% Ointment 1 Application(s) Topical two times a day PRN Itching  SUMAtriptan 50 milliGRAM(s) Oral every 12 hours PRN Moderate to Severe Headache      I&O's Summary    22 Oct 2024 07:01  -  23 Oct 2024 07:00  --------------------------------------------------------  IN: 900 mL / OUT: 0 mL / NET: 900 mL        PHYSICAL EXAM:  Vital Signs Last 24 Hrs  T(C): 36.5 (23 Oct 2024 04:54), Max: 36.6 (22 Oct 2024 20:57)  T(F): 97.7 (23 Oct 2024 04:54), Max: 97.8 (22 Oct 2024 20:57)  HR: 94 (23 Oct 2024 04:54) (91 - 94)  BP: 116/80 (23 Oct 2024 04:54) (116/80 - 135/76)  BP(mean): --  RR: 18 (23 Oct 2024 04:54) (18 - 18)  SpO2: 100% (23 Oct 2024 04:54) (96% - 100%)    Parameters below as of 23 Oct 2024 04:54  Patient On (Oxygen Delivery Method): room air    CONSTITUTIONAL: NAD  EYES: EOMI  ENMT: Moist oral mucosa  RESPIRATORY: Normal respiratory effort; scattered rhonchi  CARDIOVASCULAR: Regular rate and rhythm, no murmurs, no LE edema  ABDOMEN: Nontender to palpation, normoactive bowel sounds, no rebound/guarding  MUSCULOSKELETAL:  no joint swelling or tenderness to palpation  PSYCH: A+O to person, place, and time; affect appropriate  NEUROLOGY: strength and sensation grossly intact  SKIN: No rashes; no palpable lesions    LABS:                        13.7   14.50 )-----------( 337      ( 23 Oct 2024 09:25 )             44.2     10-23    134[L]  |  99  |  18  ----------------------------<  101[H]  4.1   |  22  |  1.07    Ca    8.9      23 Oct 2024 09:25  Phos  3.3     10-22  Mg     1.9     10-23    TPro  7.7  /  Alb  4.4  /  TBili  0.8  /  DBili  0.2  /  AST  13  /  ALT  15  /  AlkPhos  65  10-22    PT/INR - ( 22 Oct 2024 15:07 )   PT: 23.9 sec;   INR: 2.11 ratio         PTT - ( 22 Oct 2024 15:07 )  PTT:48.6 sec      Urinalysis Basic - ( 23 Oct 2024 09:25 )    Color: x / Appearance: x / SG: x / pH: x  Gluc: 101 mg/dL / Ketone: x  / Bili: x / Urobili: x   Blood: x / Protein: x / Nitrite: x   Leuk Esterase: x / RBC: x / WBC x   Sq Epi: x / Non Sq Epi: x / Bacteria: x        SARS-CoV-2: NotDetec (14 Oct 2024 13:58)      RADIOLOGY & ADDITIONAL TESTS:  New Imaging Personally Reviewed Today:  New Electrocardiogram Personally Reviewed Today:  Other Results Reviewed Today:   Prior or Outpatient Records Reviewed Today with Summary:    COORDINATION OF CARE:  Consultant Communication and Details of Discussion (where applicable):

## 2024-10-23 NOTE — CONSULT NOTE ADULT - ASSESSMENT
44 year old woman with a history of pHTN (group I and IV on Rimodulin and Xarelto, on home 2L O2), c/b R heart failure, history of RA thrombus s/p thrombectomy, history of DVT/PE (2016), multiple abdominal wall abscesses (s/p I&D), asthma, bradycardia s/p dual chamber PPM. Patient presents with 2 days gradual onset palpitations and SOB on exertion, associated with midsternal chest tightness that is non-radiating. ROS additionally positive for epigastric pain. She states she usually had all these symptoms in the past for pHTN and asthma exacerbation. She endorse some baseline cough and sputum production but states it is not worse than usual. She denies fever, chills, dysuria, URI symptoms.    RVP (10/14) Negative  BCx (10/14) NGTD  UA (10/22) Negative Leukocyte Esterase  Urine Legionella Ag (10/22) Negative    MRSA/MSSA Nasal PCR (10/22) Positive MRSA    CT Chest (10/23) Unchanged bilateral groundglass opacities, may be related to pulmonary hypertension.    CT A/P (10/23) New pancreatic ductal dilatation. Recommend contrast-enhanced MRI/MRCP for further evaluation. 44 year old woman with a history of pHTN (group I and IV on Rimodulin and Xarelto, on home 2L O2), c/b R heart failure, history of RA thrombus s/p thrombectomy, history of DVT/PE (2016), multiple abdominal wall abscesses (s/p I&D), asthma, bradycardia s/p dual chamber PPM. Patient presents with 2 days gradual onset palpitations and SOB on exertion, associated with midsternal chest tightness that is non-radiating. ROS additionally positive for epigastric pain. She states she usually had all these symptoms in the past for pHTN and asthma exacerbation. She endorse some baseline cough and sputum production but states it is not worse than usual. She denies fever, chills, dysuria, URI symptoms.    RVP (10/14) Negative  BCx (10/14) NGTD  UA (10/22) Negative Leukocyte Esterase  Urine Legionella Ag (10/22) Negative    MRSA/MSSA Nasal PCR (10/22) Positive MRSA    CT Chest (10/23) Unchanged bilateral groundglass opacities, may be related to pulmonary hypertension.    CT A/P (10/23) New pancreatic ductal dilatation. Recommend contrast-enhanced MRI/MRCP for further evaluation.    #Fever, Abnormal CT Chest  --Monitor off of antibiotics for now  --If further fevers would initiate Vancomycin + Cefepime  --Follow up on RVP  --Given GGO on CT Chest will check Serum Fungitell (although has been on <20 mg of steroids recently so PCP unlikely)  --Continue to follow CBC with diff  --Continue to follow transaminases  --Continue to follow temperature curve  --Follow up on preliminary blood cultures    #Pre-Lung Transplant Evaluation  --Recommend COVID19 Nucleocapsid Antibody  --Recommend COVID19 Rajinder Antibody  --Recommend HAV IgG  --Recommend HBVs Ab, HBVsAg, HBVc Ab  --Recommend HCV Ab  --Recommend HSV 1 IgG /HSV 2 IgG  --Recommend EBV IgG  --Recommend CMV IgG  --Recommend VZV IgG  --Recommend Measles IgG , Mumps IgG and Rubella IgG   --Recommend Quantiferon Gold  --Recommend HIV Ag/Ab by [facility]  --Recommend Syphilis Screen  --Recommend Toxoplasma IgG  --Recommend Strongyloides Ab  --Recommend Trypanosoma cruzi Ab  --Recommend Coccidiodes Ab    #Encounter to Vaccinate Patient  COVID19: Would benefit from COVID19 [id number] Vaccine Dose  Influenza: Will require  RSV: Will require  Pneumococcal: States she had pneumococcal vaccination ~2022  HAV: Check HAV IgG  HBV: Check HBVs Ab  MMR: Check MMR IgG   Varicella: Check Varicella IgG   Shingles: Will require Shingrix  Tdap: Will require Tdap    I will continue to follow. Please feel free to contact me with any further questions.    Rogelio Gonsales M.D.  [facility] Division of Infectious Disease  8AM-5PM Monday - Friday: Available on [employer] Teams  After Hours and Holidays (or if no response on Microsoft Teams): Please contact the Infectious Diseases Office at [phone]

## 2024-10-23 NOTE — BH CONSULTATION LIAISON ASSESSMENT NOTE - SUMMARY
44-yr-old female with no PPHx with PMHx of PulmHTN class I/VI since 2014 ( on continuous Treprostinil and home O2 (2L), Rt heart failure and bradycardia, s/p dual chamber PPM, RA thrombus s/p thrombectomy, chronic PE, abdominal wall abscesses now presenting with palpitations and dyspnea for whom Transplant psychiatry was consulted for psychosocial clearance evaluation for Lung transplant. Patient is able to rationally manipulate information about the transplant process, notably risks and some aftercare recommendations. Has limited to moderate understanding of her condition and it's proposed treatment. She does understand the prognosis with or without the transplant. Would continue to provide further education about the transplant process. No pertinent past psychiatric history but currently exhibiting signs of adjustment d/o in setting of decline in health/need for transplantation. Additionally reports initial insomnia from anxious ruminations.    Plan:  -SIPAT 14- Patient is deemed to a good candidate from psychosocial standpoint; score likely to improve with further education about transplant process and aftercare recommendations.  -Labs: UDS, PETH, Cotinine, B12/Folate, TSH w/ FT4  -Can consider use of Trazodone 25 mg PO HS for insomnia  -Holistic RN consult recommended  -Non-pharmacologic means of anxiety alleviation recommended including progressive muscle relaxation, mindfulness and/or meditation   -Transplant psychiatry will continue to follow and reassess

## 2024-10-23 NOTE — CONSULT NOTE ADULT - ASSESSMENT
44F pHTN (group I and IV on Remodulin, PE (Xarelto), on home 2L O2), c/b RV failure, history of RA thrombus s/p thrombectomy, history of DVT/PE (2016), multiple abdominal wall abscesses (s/p I&D), asthma (chronic steroid use), bradycardia s/p dual chamber PPM presenting with dyspnea    Impression  #PD dilation, 6mm  #severe pHTN; s/p RHC showing severe pulmonary HTN (sPAP 74mmHg, dPAP 43mmHg, mPAP 55mmHg)  #pre-lung transplant evaluation  #RV failure  Patient initially presenting with dyspnea and noted to have flare of pulmonary HTN c/b acute heart failure requiring diuresis; now symptomatically improved back to baseline on home 2LNC and undergoing lung transplant evaluation. Labs notable for WBC 14k; Hgb 13.7; plts 337; LFTs normal. CT A/P NC showing new PD dilation of 6mm compared to 4/2024. No FHx of pancreatic cancer; no prior episodes of pancreatitis. DDx for PD dilation include pancreatic cancer vs. main duct IPMN vs. strictures. Plan for EGD/EUS/FNB Friday.    Recommendations  - EGD/EUS +/- FNB Friday  - hold Xarelto; can use heparin gtt in interim  - cardiac optimization  - pulmonary optimization    Reviewed and discussed with Dr. Barone  Note and recommendations are incomplete until reviewed and attested by attending.    Scooby Cummings MD  GI/Hepatology Fellow, PGY5  [place] Pager [phone] or [facility] Pager [phone]  Teams preferred (7AM to 5PM); after 5PM, call GI fellow on call    On Weekends/Holidays (All Day) and Weekdays after 5PM to 8AM  For non-urgent consults, please email [email or web address] and [email or web address]  For urgent consults, please contact on call GI team. See Amion schedule ([facility]), [employer] paging system ([facility]), or call hospital  ([facility])

## 2024-10-23 NOTE — CONSULT NOTE ADULT - SUBJECTIVE AND OBJECTIVE BOX
Patient is a 44y old  Female who presents with a chief complaint of palpitation, worsening SOUZA and chest tightness (23 Oct 2024 12:38)      HPI:  44 year old woman with a history of pHTN (group I and IV on Rimodulin and Xarelto, on home 2L O2), c/b R heart failure, history of RA thrombus s/p thrombectomy, history of DVT/PE (2016), multiple abdominal wall abscesses (s/p I&D), asthma, bradycardia s/p dual chamber PPM. Patient presents with 2 days gradual onset palpitations and SOB on exertion, associated with midsternal chest tightness that is non-radiating. ROS additionally positive for epigastric pain. She states she usually had all these symptoms in the past for pHTN and asthma exacerbation. She endorse some baseline cough and sputum production but states it is not worse than usual. She denies fever, chills, dysuria, URI symptoms.    RVP (10/14) Negative  BCx (10/14) NGTD  UA (10/22) Negative Leukocyte Esterase  Urine Legionella Ag (10/22) Negative    MRSA/MSSA Nasal PCR (10/22) Positive MRSA    CT Chest (10/23) Unchanged bilateral groundglass opacities, may be related to pulmonary hypertension.    CT A/P (10/23) New pancreatic ductal dilatation. Recommend contrast-enhanced MRI/MRCP for further evaluation.    prior hospital charts reviewed [  ]  primary team notes reviewed [  ]  other consultant notes reviewed [  ]    PAST MEDICAL & SURGICAL HISTORY:  Tachycardia      Anemia      Pulmonary hypertension      Pulmonary embolism      Asthma  On home O2 nightly at 2L via NC      PE (pulmonary thromboembolism)  on Xarelto 10 mg daily      Sinusitis      Corneal disorder      Right heart failure      CAD (coronary artery disease)      H/O pulmonary hypertension      Pulmonary embolism      Asthma      History of tachycardia      On supplemental oxygen by nasal cannula  O2 @ 2L      Pacemaker      Skin mass  left upper thigh mass      History of tubal ligation      Pacemaker      H/O tubal ligation      History of pacemaker  12/19 - [employer] model Ingevity 4469          Allergies  adhesives (Rash)  vancomycin (Rash)  penicillin (Rash)    ANTIMICROBIALS (past 90 days)  MEDICATIONS  (STANDING):      ANTIMICROBIALS:      OTHER MEDS: MEDICATIONS  (STANDING):  acetaminophen     Tablet .. 650 every 6 hours PRN  acetaminophen 325 mG/butalbital 50 mG/caffeine 40 mG 1 every 8 hours PRN  aluminum hydroxide/magnesium hydroxide/simethicone Suspension 30 every 4 hours PRN  buDESOnide    Inhalation Suspension 0.5 two times a day  buMETAnide 1 daily  influenza   Vaccine 0.5 once  ipratropium    for Nebulization 500 every 6 hours  montelukast 10 daily  NIFEdipine XL 30 <User Schedule>  pantoprazole    Tablet 40 before breakfast  predniSONE   Tablet 5 daily  rivaroxaban 20 daily  sildenafil (REVATIO) 20 <User Schedule>  spironolactone 50 two times a day  SUMAtriptan 50 every 12 hours PRN    SOCIAL HISTORY:   hx smoking  non-smoker    FAMILY HISTORY:  Family history of diabetes mellitus  in brother    Family history of diabetes mellitus in mother    FH: anemia      REVIEW OF SYSTEMS  [  ] ROS unobtainable because:    [  ] All other systems negative except as noted below:	    Constitutional:  [ ] fever [ ] chills  [ ] weight loss  [ ] weakness  Skin:  [ ] rash [ ] phlebitis	  Eyes: [ ] icterus [ ] pain  [ ] discharge	  ENMT: [ ] sore throat  [ ] thrush [ ] ulcers [ ] exudates  Respiratory: [ ] dyspnea [ ] hemoptysis [ ] cough [ ] sputum	  Cardiovascular:  [ ] chest pain [ ] palpitations [ ] edema	  Gastrointestinal:  [ ] nausea [ ] vomiting [ ] diarrhea [ ] constipation [ ] pain	  Genitourinary:  [ ] dysuria [ ] frequency [ ] hematuria [ ] discharge [ ] flank pain  [ ] incontinence  Musculoskeletal:  [ ] myalgias [ ] arthralgias [ ] arthritis  [ ] back pain  Neurological:  [ ] headache [ ] seizures  [ ] confusion/altered mental status  Psychiatric:  [ ] anxiety [ ] depression	  Hematology/Lymphatics:  [ ] lymphadenopathy  Endocrine:  [ ] adrenal [ ] thyroid  Allergic/Immunologic:	 [ ] transplant [ ] seasonal    Vital Signs Last 24 Hrs  T(F): 97.7 (10-23-24 @ 04:54), Max: 100.6 (10-22-24 @ 10:44)  Vital Signs Last 24 Hrs  HR: 94 (10-23-24 @ 04:54) (91 - 94)  BP: 116/80 (10-23-24 @ 04:54) (116/80 - 135/76)  RR: 18 (10-23-24 @ 04:54)  SpO2: 100% (10-23-24 @ 04:54) (96% - 100%)  Wt(kg): --    PHYSICAL EXAMINATION:  General: Alert and Awake, NAD  HEENT: PERRL, EOMI, No subconjunctival hemorrhages, Oropharynx Clear, MMM  Neck: Supple, No PHOENIX  Cardiac: RRR, No M/R/G  Resp: CTAB, No Wh/Rh/Ra  Abdomen: NBS, NT/ND, No HSM, No rigidity or guarding  MSK: No LE edema. No stigmata of IE. No evidence of phlebitis. No evidence of synovitis.  : No lucas  Skin: No rashes or lesions. Skin is warm and dry to the touch.   Neuro: Alert and Awake. CN 2-12 Grossly intact. Moves all four extremities spontaneously.  Psych: Calm, Pleasant, Cooperative                          13.7   14.50 )-----------( 337      ( 23 Oct 2024 09:25 )             44.2     10-23    134[L]  |  99  |  18  ----------------------------<  101[H]  4.1   |  22  |  1.07    Ca    8.9      23 Oct 2024 09:25  Phos  3.3     10-22  Mg     1.9     10-23    TPro  7.7  /  Alb  4.4  /  TBili  0.8  /  DBili  0.2  /  AST  13  /  ALT  15  /  AlkPhos  65  10-22    Urinalysis Basic - ( 23 Oct 2024 09:25 )    Color: x / Appearance: x / SG: x / pH: x  Gluc: 101 mg/dL / Ketone: x  / Bili: x / Urobili: x   Blood: x / Protein: x / Nitrite: x   Leuk Esterase: x / RBC: x / WBC x   Sq Epi: x / Non Sq Epi: x / Bacteria: x    MICROBIOLOGY:                RADIOLOGY:    <The imaging below has been reviewed and visualized by me independently. Findings as detailed in report below>     Patient is a 44y old  Female who presents with a chief complaint of palpitation, worsening SOUZA and chest tightness (23 Oct 2024 12:38)    HPI:    44 year old woman with a history of pHTN (group I and IV on Rimodulin and Xarelto, on home 2L O2), c/b R heart failure, history of RA thrombus s/p thrombectomy, history of DVT/PE (2016), multiple abdominal wall abscesses (s/p I&D), asthma, bradycardia s/p dual chamber PPM. Patient presents with 2 days gradual onset palpitations and SOB on exertion, associated with midsternal chest tightness that is non-radiating. ROS additionally positive for epigastric pain. She states she usually had all these symptoms in the past for pHTN and asthma exacerbation. She endorse some baseline cough and sputum production but states it is not worse than usual. She denies fever, chills, dysuria, URI symptoms.    RVP (10/14) Negative  BCx (10/14) NGTD  UA (10/22) Negative Leukocyte Esterase  Urine Legionella Ag (10/22) Negative    MRSA/MSSA Nasal PCR (10/22) Positive MRSA    CT Chest (10/23) Unchanged bilateral groundglass opacities, may be related to pulmonary hypertension.    CT A/P (10/23) New pancreatic ductal dilatation. Recommend contrast-enhanced MRI/MRCP for further evaluation.    prior hospital charts reviewed [  ]  primary team notes reviewed [x  ]  other consultant notes reviewed [ x ]    PAST MEDICAL & SURGICAL HISTORY:  Tachycardia      Anemia      Pulmonary hypertension      Pulmonary embolism      Asthma  On home O2 nightly at 2L via NC      PE (pulmonary thromboembolism)  on Xarelto 10 mg daily      Sinusitis      Corneal disorder      Right heart failure      CAD (coronary artery disease)      H/O pulmonary hypertension      Pulmonary embolism      Asthma      History of tachycardia      On supplemental oxygen by nasal cannula  O2 @ 2L      Pacemaker      Skin mass  left upper thigh mass      History of tubal ligation      Pacemaker      H/O tubal ligation      History of pacemaker  12/19 - [place] model Ingevity 4469          Allergies  adhesives (Rash)  vancomycin (Rash)  penicillin (Rash)    ANTIMICROBIALS (past 90 days)  MEDICATIONS  (STANDING):      ANTIMICROBIALS:      OTHER MEDS: MEDICATIONS  (STANDING):  acetaminophen     Tablet .. 650 every 6 hours PRN  acetaminophen 325 mG/butalbital 50 mG/caffeine 40 mG 1 every 8 hours PRN  aluminum hydroxide/magnesium hydroxide/simethicone Suspension 30 every 4 hours PRN  buDESOnide    Inhalation Suspension 0.5 two times a day  buMETAnide 1 daily  influenza   Vaccine 0.5 once  ipratropium    for Nebulization 500 every 6 hours  montelukast 10 daily  NIFEdipine XL 30 <User Schedule>  pantoprazole    Tablet 40 before breakfast  predniSONE   Tablet 5 daily  rivaroxaban 20 daily  sildenafil (REVATIO) 20 <User Schedule>  spironolactone 50 two times a day  SUMAtriptan 50 every 12 hours PRN    SOCIAL HISTORY:    Born in [place]. Lived for [facility] in [place].  Also with travel to [place] and [place].  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 Denies prior positive latent tuberculosis testing. No known tuberculosis exposure. No pets. No livestock exposure.     Had 2 episodes of prior pneumonia. No prior COVID19 infection. Had episodes of skin infection / abscess at prior IV infusion site. No LE wounds / infecton    FAMILY HISTORY:  Family history of diabetes mellitus  in brother    Family history of diabetes mellitus in mother    FH: anemia      REVIEW OF SYSTEMS  [  ] ROS unobtainable because:    [ x ] All other systems negative except as noted below:	    Constitutional:  [x ] fever [ ] chills  [ ] weight loss  [ ] weakness  Skin:  [ ] rash [ ] phlebitis	  Eyes: [ ] icterus [ ] pain  [ ] discharge	  ENMT: [ ] sore throat  [ ] thrush [ ] ulcers [ ] exudates  Respiratory: [ ] dyspnea [ ] hemoptysis [ ] cough [ ] sputum	  Cardiovascular:  [ ] chest pain [ ] palpitations [ ] edema	  Gastrointestinal:  [ ] nausea [ ] vomiting [ ] diarrhea [ ] constipation [ ] pain	  Genitourinary:  [ ] dysuria [ ] frequency [ ] hematuria [ ] discharge [ ] flank pain  [ ] incontinence  Musculoskeletal:  [ ] myalgias [ ] arthralgias [ ] arthritis  [ ] back pain  Neurological:  [ ] headache [ ] seizures  [ ] confusion/altered mental status  Psychiatric:  [ ] anxiety [ ] depression	  Hematology/Lymphatics:  [ ] lymphadenopathy  Endocrine:  [ ] adrenal [ ] thyroid  Allergic/Immunologic:	 [ ] transplant [ ] seasonal    Vital Signs Last 24 Hrs  T(F): 97.7 (10-23-24 @ 04:54), Max: 100.6 (10-22-24 @ 10:44)  Vital Signs Last 24 Hrs  HR: 94 (10-23-24 @ 04:54) (91 - 94)  BP: 116/80 (10-23-24 @ 04:54) (116/80 - 135/76)  RR: 18 (10-23-24 @ 04:54)  SpO2: 100% (10-23-24 @ 04:54) (96% - 100%)  Wt(kg): --    PHYSICAL EXAMINATION:  General: Alert and Awake, NAD  HEENT: PERRL, EOMI  Neck: Supple  Cardiac: RRR, No M/R/G  Resp: CTAB, No Wh/Rh/Ra  Abdomen: NBS, NT/ND, No HSM, No rigidity or guarding  MSK: No LE edema. No stigmata of IE. No evidence of phlebitis. No evidence of synovitis.  Skin: L anterior thigh with soft mobile pedunculated mass  Neuro: Alert and Awake. CN 2-12 Grossly intact. Moves all four extremities spontaneously.  Psych: Calm, Pleasant, Cooperative                          13.7   14.50 )-----------( 337      ( 23 Oct 2024 09:25 )             44.2     10-23    134[L]  |  99  |  18  ----------------------------<  101[H]  4.1   |  22  |  1.07    Ca    8.9      23 Oct 2024 09:25  Phos  3.3     10-22  Mg     1.9     10-23    TPro  7.7  /  Alb  4.4  /  TBili  0.8  /  DBili  0.2  /  AST  13  /  ALT  15  /  AlkPhos  65  10-22    Urinalysis Basic - ( 23 Oct 2024 09:25 )    Color: x / Appearance: x / SG: x / pH: x  Gluc: 101 mg/dL / Ketone: x  / Bili: x / Urobili: x   Blood: x / Protein: x / Nitrite: x   Leuk Esterase: x / RBC: x / WBC x   Sq Epi: x / Non Sq Epi: x / Bacteria: x    RADIOLOGY:    <The imaging below has been reviewed and visualized by me independently. Findings as detailed in report below>    < from: CT Chest No Cont (10.23.24 @ 10:08) >  IMPRESSION:  Unchanged bilateral groundglass opacities, may be related to pulmonary   hypertension.    New pancreatic ductal dilatation. Recommend contrast-enhanced MRI/MRCP   for further evaluation.    < end of copied text >       Patient is a 44y old  Female who presents with a chief complaint of palpitation, worsening SOUZA and chest tightness (23 Oct 2024 12:38)    HPI:    44 year old woman with a history of pHTN (group I and IV on Rimodulin and Xarelto, on home 2L O2), c/b R heart failure, history of RA thrombus s/p thrombectomy, history of DVT/PE (2016), multiple abdominal wall abscesses (s/p I&D), asthma, bradycardia s/p dual chamber PPM. Patient presents with 2 days gradual onset palpitations and SOB on exertion, associated with midsternal chest tightness that is non-radiating. ROS additionally positive for epigastric pain. She states she usually had all these symptoms in the past for pHTN and asthma exacerbation. She endorse some baseline cough and sputum production but states it is not worse than usual. She denies fever, chills, dysuria, URI symptoms.    RVP (10/14) Negative  BCx (10/14) NGTD  UA (10/22) Negative Leukocyte Esterase  Urine Legionella Ag (10/22) Negative    MRSA/MSSA Nasal PCR (10/22) Positive MRSA    CT Chest (10/23) Unchanged bilateral groundglass opacities, may be related to pulmonary hypertension.    CT A/P (10/23) New pancreatic ductal dilatation. Recommend contrast-enhanced MRI/MRCP for further evaluation.    prior hospital charts reviewed [  ]  primary team notes reviewed [x  ]  other consultant notes reviewed [ x ]    PAST MEDICAL & SURGICAL HISTORY:  Tachycardia      Anemia      Pulmonary hypertension      Pulmonary embolism      Asthma  On home O2 nightly at 2L via NC      PE (pulmonary thromboembolism)  on Xarelto 10 mg daily      Sinusitis      Corneal disorder      Right heart failure      CAD (coronary artery disease)      H/O pulmonary hypertension      Pulmonary embolism      Asthma      History of tachycardia      On supplemental oxygen by nasal cannula  O2 @ 2L      Pacemaker      Skin mass  left upper thigh mass      History of tubal ligation      Pacemaker      H/O tubal ligation      History of pacemaker  12/19 - [place] model Ingevity 4469          Allergies  adhesives (Rash)  vancomycin (Rash)  penicillin (Rash)    ANTIMICROBIALS (past 90 days)  MEDICATIONS  (STANDING):      ANTIMICROBIALS:      OTHER MEDS: MEDICATIONS  (STANDING):  acetaminophen     Tablet .. 650 every 6 hours PRN  acetaminophen 325 mG/butalbital 50 mG/caffeine 40 mG 1 every 8 hours PRN  aluminum hydroxide/magnesium hydroxide/simethicone Suspension 30 every 4 hours PRN  buDESOnide    Inhalation Suspension 0.5 two times a day  buMETAnide 1 daily  influenza   Vaccine 0.5 once  ipratropium    for Nebulization 500 every 6 hours  montelukast 10 daily  NIFEdipine XL 30 <User Schedule>  pantoprazole    Tablet 40 before breakfast  predniSONE   Tablet 5 daily  rivaroxaban 20 daily  sildenafil (REVATIO) 20 <User Schedule>  spironolactone 50 two times a day  SUMAtriptan 50 every 12 hours PRN    SOCIAL HISTORY:    Born in [place]. Lived primarily in [place].  Also with travel to [place] and [place].  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 Denies prior positive latent tuberculosis testing. No known tuberculosis exposure. No pets. No livestock exposure.     Had 2 episodes of prior pneumonia. No prior COVID19 infection. Had episodes of skin infection / abscess at prior IV infusion site. No LE wounds / infecton    FAMILY HISTORY:  Family history of diabetes mellitus  in brother    Family history of diabetes mellitus in mother    FH: anemia      REVIEW OF SYSTEMS  [  ] ROS unobtainable because:    [ x ] All other systems negative except as noted below:	    Constitutional:  [x ] fever [ ] chills  [ ] weight loss  [ ] weakness  Skin:  [ ] rash [ ] phlebitis	  Eyes: [ ] icterus [ ] pain  [ ] discharge	  ENMT: [ ] sore throat  [ ] thrush [ ] ulcers [ ] exudates  Respiratory: [ ] dyspnea [ ] hemoptysis [ ] cough [ ] sputum	  Cardiovascular:  [ ] chest pain [ ] palpitations [ ] edema	  Gastrointestinal:  [ ] nausea [ ] vomiting [ ] diarrhea [ ] constipation [ ] pain	  Genitourinary:  [ ] dysuria [ ] frequency [ ] hematuria [ ] discharge [ ] flank pain  [ ] incontinence  Musculoskeletal:  [ ] myalgias [ ] arthralgias [ ] arthritis  [ ] back pain  Neurological:  [ ] headache [ ] seizures  [ ] confusion/altered mental status  Psychiatric:  [ ] anxiety [ ] depression	  Hematology/Lymphatics:  [ ] lymphadenopathy  Endocrine:  [ ] adrenal [ ] thyroid  Allergic/Immunologic:	 [ ] transplant [ ] seasonal    Vital Signs Last 24 Hrs  T(F): 97.7 (10-23-24 @ 04:54), Max: 100.6 (10-22-24 @ 10:44)  Vital Signs Last 24 Hrs  HR: 94 (10-23-24 @ 04:54) (91 - 94)  BP: 116/80 (10-23-24 @ 04:54) (116/80 - 135/76)  RR: 18 (10-23-24 @ 04:54)  SpO2: 100% (10-23-24 @ 04:54) (96% - 100%)  Wt(kg): --    PHYSICAL EXAMINATION:  General: Alert and Awake, NAD  HEENT: PERRL, EOMI  Neck: Supple  Cardiac: RRR, No M/R/G  Resp: CTAB, No Wh/Rh/Ra  Abdomen: NBS, NT/ND, No HSM, No rigidity or guarding  MSK: No LE edema. No stigmata of IE. No evidence of phlebitis. No evidence of synovitis.  Skin: L anterior thigh with soft mobile pedunculated mass  Neuro: Alert and Awake. CN 2-12 Grossly intact. Moves all four extremities spontaneously.  Psych: Calm, Pleasant, Cooperative                          13.7   14.50 )-----------( 337      ( 23 Oct 2024 09:25 )             44.2     10-23    134[L]  |  99  |  18  ----------------------------<  101[H]  4.1   |  22  |  1.07    Ca    8.9      23 Oct 2024 09:25  Phos  3.3     10-22  Mg     1.9     10-23    TPro  7.7  /  Alb  4.4  /  TBili  0.8  /  DBili  0.2  /  AST  13  /  ALT  15  /  AlkPhos  65  10-22    Urinalysis Basic - ( 23 Oct 2024 09:25 )    Color: x / Appearance: x / SG: x / pH: x  Gluc: 101 mg/dL / Ketone: x  / Bili: x / Urobili: x   Blood: x / Protein: x / Nitrite: x   Leuk Esterase: x / RBC: x / WBC x   Sq Epi: x / Non Sq Epi: x / Bacteria: x    RADIOLOGY:    <The imaging below has been reviewed and visualized by me independently. Findings as detailed in report below>    < from: CT Chest No Cont (10.23.24 @ 10:08) >  IMPRESSION:  Unchanged bilateral groundglass opacities, may be related to pulmonary   hypertension.    New pancreatic ductal dilatation. Recommend contrast-enhanced MRI/MRCP   for further evaluation.    < end of copied text >

## 2024-10-23 NOTE — CONSULT NOTE ADULT - ATTENDING COMMENTS
As above.    Patient seen and examined in the late afternoon.  Discussed with GI fellow earlier in the day.    Impression:    #1.  Consulted for mild new pancreatic ductal dilatation on noncontrast CT scan of the chest abdomen pelvis    #2.  Patient admitted for shortness of breath due to exacerbation of right heart failure in the setting of severe pulmonary hypertension, being evaluated for lung transplantation.    #3.  Patient on anticoagulation for DVT/PE with Xarelto    #4.  INR elevated 2.2    #5.  Patient with infusion pump for pulmonary hypertension medication, incompatible with MRI, unable to get MRCP    Recommendation:    #1.  CT scan of the abdomen pancreas protocol to further characterize pancreas    #2.  Depending on CT scan results, may schedule upper endoscopic ultrasound for further evaluation to rule out pancreatic mass    #3.  Patient is at elevated/high risk for anesthesia, per transplant hepatology note will need cardiac anesthesiologist for endoscopic procedures.    #4.  Hold Xarelto, may bridge with heparin drip if necessary per primary team.

## 2024-10-23 NOTE — BH CONSULTATION LIAISON ASSESSMENT NOTE - RISK ASSESSMENT
Risk factors: decline in health  Protective factors: no current SIIP/HIIP, no h/o SA/SIB, no h/o psych admissions, no access to weapons, no active substance abuse, help-seeking behaviors

## 2024-10-23 NOTE — BH CONSULTATION LIAISON ASSESSMENT NOTE - HPI (INCLUDE ILLNESS QUALITY, SEVERITY, DURATION, TIMING, CONTEXT, MODIFYING FACTORS, ASSOCIATED SIGNS AND SYMPTOMS)
44-yr-old female with no PPHx with PMHx of PulmHTN class I/VI since 2014 ( on continuous Treprostinil and home O2 (2L), Rt heart failure and bradycardia, s/p dual chamber PPM, RA thrombus s/p thrombectomy, chronic PE, abdominal wall abscesses now presenting with palpitations and dyspnea for whom Transplant psychiatry was consulted for psychosocial clearance evaluation for Lung transplant.      Patient seen at bedside with family present who she wishes remain present during evaluation. Notes she was first diagnosed with pulmonary hypertension in 2014 and has been following up with heart failure team as well in the outpatient settings. Reports feeling overwhelmed from having gone through this evaluation at [facility], stating it was deferred due to her doing well medically till recently. Reports anxiety over medical diagnosis and need for transplantation and oftentimes will "argue" to cope as she cannot identify alternative hobbies or coping mechanisms. Saw a psychiatrist in setting of pre-lung transplant evaluation. Patient is amenable to outpatient psychotherapy. Denies any overt hopelessness, worthlessness or anhedonia currently but report reports low mood. Has not experienced any changes in concentration or appetite but has initial insomnia for which she requests pharmacologic treatment. Denies symptoms of hypomania/nadia, PTSD, OCD or psychosis. Denies SI/HI/AVH w/ no plan/intent of self-harm. Reports social alcohol use over the weekends, unable to quantify amount in the past but states it used to be "a lot over the weekends" but has cut down to 1-2 drinks on holidays or birthdays. Has tried cannabis and hookah in the past but denies current use or use of additional recreational substances.     Patient is able to rationally manipulate information about the transplant process, notably risks and some aftercare recommendations. Has limited to moderate understanding of her condition and it's proposed treatment. She does understand the prognosis with or without the transplant. Would continue to provide further education about the transplant process.

## 2024-10-23 NOTE — PROGRESS NOTE ADULT - PROBLEM SELECTOR PLAN 3
Imaging and laboratory testing for transplant eval ordered  - Lung TXP team following, f/u recs  - Dermatology, dental, psych consulted  - Will eventually need [facility]  - D/w ophthalmology, no need for inpatient evaluation as sx are unchanged from prior and has been seen by outpatient providers for this  - Will need to be discharged on ozempic to assist with weight loss - covered $0 copay  - cardiac MR for lung txp eval and MRCP for incidental PD dilatation pending if Remodulin can be transitioned from home pump to hospital pump, pending d/w pulm Imaging and laboratory testing for transplant eval ordered  - Lung TXP team following, f/u recs  - Dermatology, dental, psych consulted  - Will eventually need [facility]  - D/w ophthalmology, no need for inpatient evaluation as sx are unchanged from prior and has been seen by outpatient providers for this  - Will need to be discharged on ozempic to assist with weight loss - covered $0 copay  - cardiac MR for lung txp eval and MRCP for incidental PD dilatation, unable to obtain as patient is dependent on home pump for Remodulin. Per pulmonary - unable to pause and unable to transition off home pump to hospital pump. Will obtain MUGA scan in lieu of cardiac MRI. [facility] emailed re PD dilatation.

## 2024-10-23 NOTE — PROGRESS NOTE ADULT - PROBLEM SELECTOR PLAN 4
Low grade fever 100.6 on 10/22. No new sx. UA neg. CT CAP without evidence of infection.  - f/u RVP and bcx  - monitor off abx

## 2024-10-23 NOTE — CONSULT NOTE ADULT - SUBJECTIVE AND OBJECTIVE BOX
HPI:  44 year old woman with a history of pHTN (group I and IV on Rimodulin and Xarelto, on home 2L O2), c/b R heart failure, history of RA thrombus s/p thrombectomy, history of DVT/PE (2016), multiple abdominal wall abscesses (s/p I&D), asthma, bradycardia s/p dual chamber PPM. Patient presents with 2 days gradual onset palpitations and SOB on exertion, associated with midsternal chest tightness that is non-radiating. ROS additionally positive for epigastric pain. She states she usually had all these symptoms in the past for pHTN and asthma exacerbation. She endorse some baseline cough and sputum production but states it is not worse than usual. She denies fever, chills, dysuria, URI symptoms.     ED: T98.5,  -> 104, BP 120s/70s, 99% 4L. Labs notable for WBC 13.08, BNP 1861 (previously 840 7/2024). CT angio w/ patchy GGO (unchanged from previous). (14 Oct 2024 06:01)     Dermatology was consulted as part of lung transplant evaluation. Patient states that she has no personal or family history of skin cancer. Has a soft mobile mass on L anterior thigh that has been there for 3 years, asymptomatic. Has had evaluated but never removed         PAST MEDICAL & SURGICAL HISTORY:  Tachycardia      Anemia      Pulmonary hypertension      Pulmonary embolism      Asthma  On home O2 nightly at 2L via NC      PE (pulmonary thromboembolism)  on Xarelto 10 mg daily      Sinusitis      Corneal disorder      Right heart failure      CAD (coronary artery disease)      H/O pulmonary hypertension      Pulmonary embolism      Asthma      History of tachycardia      On supplemental oxygen by nasal cannula  O2 @ 2L      Pacemaker      Skin mass  left upper thigh mass      History of tubal ligation      Pacemaker      H/O tubal ligation      History of pacemaker  12/19 - [employer] model Ingevity 4469          Review of Systems: ____________________________________  REVIEW OF SYSTEMS      General: no fevers/chills, no lethary	    Skin/Breast: see HPI  	  Ophthalmologic: no eye pain or change in vision  	  ENMT: no dysphagia or change in hearing    Respiratory and Thorax: SOB, chronic  	  Cardiovascular: no palpitations or chest pain    Gastrointestinal: no abdomenal pain or blood in stool     Genitourinary: no dysuria or frequency    Musculoskeletal: no joint pains or weakness	    Neurological:no weakness, numbness , or tingling    MEDICATIONS  (STANDING):  buDESOnide    Inhalation Suspension 0.5 milliGRAM(s) Inhalation two times a day  buMETAnide 1 milliGRAM(s) Oral daily  influenza   Vaccine 0.5 milliLiter(s) IntraMuscular once  ipratropium    for Nebulization 500 MICROGram(s) Nebulizer every 6 hours  montelukast 10 milliGRAM(s) Oral daily  mupirocin 2% Nasal 1 Application(s) Both Nostrils two times a day  NIFEdipine XL 30 milliGRAM(s) Oral <User Schedule>  pantoprazole    Tablet 40 milliGRAM(s) Oral before breakfast  predniSONE   Tablet 5 milliGRAM(s) Oral daily  rivaroxaban 20 milliGRAM(s) Oral daily  sildenafil (REVATIO) 20 milliGRAM(s) Oral <User Schedule>  spironolactone 50 milliGRAM(s) Oral two times a day  traZODone 25 milliGRAM(s) Oral at bedtime  Treprostinil SubQ Continous Infusion 51 NANOGram(s)/kG/Min 0.056 mL/Hr IV Continuous <Continuous>    ALLERGIES: adhesives  vancomycin  penicillin        SOCIAL HISTORY:  ____________________________________  Social History:  Never smoker  Former social drinker  No illicit drug use  , lives with  and son  used to work as HHA  FAMILY HISTORY: ____________________________________  FAMILY HISTORY:  Family history of diabetes mellitus  in brother    Family history of diabetes mellitus in mother    FH: anemia          VITAL SIGNS LAST 24 HOURS:  T(F): 97.3 (10-23 @ 13:13), Max: 97.8 (10-22 @ 20:57)  HR: 101 (10-23 @ 14:30) (91 - 101)  BP: 127/85 (10-23 @ 14:30) (103/73 - 135/76)  RR: 18 (10-23 @ 13:13) (18 - 18)    ___________________________________  PHYSICAL EXAM:     The patient was alert and oriented X 3, and in no  apparent distress.  OP showed no ulcerations  There was no visible lymphadenopathy.  Conjunctiva were non injected  There was no clubbing or edema of extremities.  The scalp, hair, face, eyebrows, lips, OP, neck, chest, back,   extremities X 4, nails were examined.  There was no hyperhidrosis or bromhidrosis.    Of note on skin exam: dark brown papules/ plaques w/o concerning features on dermoscopy. Brown stuck on plaque R mid back. L anterior thigh with soft mobile pedunculated mass    ____________________________________    LABS:                        13.7   14.50 )-----------( 337      ( 23 Oct 2024 09:25 )             44.2     10-23    134[L]  |  99  |  18  ----------------------------<  101[H]  4.1   |  22  |  1.07    Ca    8.9      23 Oct 2024 09:25  Phos  3.3     10-22  Mg     1.9     10-23    TPro  7.7  /  Alb  4.4  /  TBili  0.8  /  DBili  0.2  /  AST  13  /  ALT  15  /  AlkPhos  65  10-22    PT/INR - ( 22 Oct 2024 15:07 )   PT: 23.9 sec;   INR: 2.11 ratio         PTT - ( 22 Oct 2024 15:07 )  PTT:48.6 sec  Urinalysis Basic - ( 23 Oct 2024 09:25 )    Color: x / Appearance: x / SG: x / pH: x  Gluc: 101 mg/dL / Ketone: x  / Bili: x / Urobili: x   Blood: x / Protein: x / Nitrite: x   Leuk Esterase: x / RBC: x / WBC x   Sq Epi: x / Non Sq Epi: x / Bacteria: x

## 2024-10-23 NOTE — PROGRESS NOTE ADULT - SUBJECTIVE AND OBJECTIVE BOX
Lung Transplant Progress Note  =====================================================  24 hour events: Pt seen and examined bedside, reports feeling well this morning.    T(C): 36.5 (10-23-24 @ 04:54), Max: 36.6 (10-22-24 @ 20:57)  HR: 94 (10-23-24 @ 04:54) (91 - 94)  BP: 116/80 (10-23-24 @ 04:54) (116/80 - 135/76)  RR: 18 (10-23-24 @ 04:54) (18 - 18)  SpO2: 100% (10-23-24 @ 04:54) (96% - 100%)    PAST MEDICAL & SURGICAL HISTORY:  Tachycardia      Anemia      Pulmonary hypertension      Pulmonary embolism      Asthma  On home O2 nightly at 2L via NC      PE (pulmonary thromboembolism)  on Xarelto 10 mg daily      Sinusitis      Corneal disorder      Right heart failure      CAD (coronary artery disease)      H/O pulmonary hypertension      Pulmonary embolism      Asthma      History of tachycardia      On supplemental oxygen by nasal cannula  O2 @ 2L      Pacemaker      Skin mass  left upper thigh mass      History of tubal ligation      Pacemaker      H/O tubal ligation      History of pacemaker  12/19 - [employer] model Ingevity 4469    Home Meds: Home Medications:  Atrovent 18 mcg/inh inhalation aerosol: 1 inhaled 4 times a day as needed for  shortness of breath and/or wheezing (14 Oct 2024 05:41)  omeprazole 40 mg oral delayed release capsule: 1 cap(s) orally once a day (14 Oct 2024 05:42)  predniSONE 10 mg oral tablet: 1 tab(s) orally once a day (14 Oct 2024 05:42)  Remodulin 5 mg/mL injectable solution: 1 application injectable once a day patient is taking 48ng/kg/min via her own SQ PUMP (14 Oct 2024 05:42)  rivaroxaban 20 mg oral tablet: 1 tab(s) orally once a day (before a meal) (14 Oct 2024 05:42)  sildenafil 20 mg oral tablet: 1 tab(s) orally every 8 hours (14 Oct 2024 05:42)  spironolactone 25 mg oral tablet: 2 tab(s) orally once a day (14 Oct 2024 05:42)    Allergies: Allergies    adhesives (Rash)  vancomycin (Rash)  penicillin (Rash)    Intolerances    REVIEW OF SYSTEMS  [x] A ten-point review of systems was otherwise negative except as noted.  [ ] Due to altered mental status/intubation, subjective information were not able to be obtained from the patient. History was obtained, to the extent possible, from review of the chart and collateral sources of information.    CURRENT MEDICATIONS:   Neurologic Medications  acetaminophen     Tablet .. 650 milliGRAM(s) Oral every 6 hours PRN Temp greater or equal to 38C (100.4F), Mild Pain (1 - 3)  acetaminophen 325 mG/butalbital 50 mG/caffeine 40 mG 1 Tablet(s) Oral every 8 hours PRN Mild Pain (1 - 3)  SUMAtriptan 50 milliGRAM(s) Oral every 12 hours PRN Moderate to Severe Headache    Respiratory Medications  buDESOnide    Inhalation Suspension 0.5 milliGRAM(s) Inhalation two times a day  ipratropium    for Nebulization 500 MICROGram(s) Nebulizer every 6 hours  montelukast 10 milliGRAM(s) Oral daily    Cardiovascular Medications  buMETAnide 1 milliGRAM(s) Oral daily  NIFEdipine XL 30 milliGRAM(s) Oral <User Schedule>  sildenafil (REVATIO) 20 milliGRAM(s) Oral <User Schedule>  spironolactone 50 milliGRAM(s) Oral two times a day    Gastrointestinal Medications  aluminum hydroxide/magnesium hydroxide/simethicone Suspension 30 milliLiter(s) Oral every 4 hours PRN Dyspepsia  pantoprazole    Tablet 40 milliGRAM(s) Oral before breakfast    Genitourinary Medications    Hematologic/Oncologic Medications  influenza   Vaccine 0.5 milliLiter(s) IntraMuscular once  rivaroxaban 20 milliGRAM(s) Oral daily    Antimicrobial/Immunologic Medications    Endocrine/Metabolic Medications  predniSONE   Tablet 5 milliGRAM(s) Oral daily    Topical/Other Medications  hydrocortisone 1% Ointment 1 Application(s) Topical two times a day PRN Itching  mupirocin 2% Nasal 1 Application(s) Both Nostrils two times a day  Treprostinil SubQ Continous Infusion 51 NANOGram(s)/kG/Min 0.056 mL/Hr IV Continuous <Continuous>      INS/OUTS (last 24 hours):  I&O's Summary    22 Oct 2024 07:01  -  23 Oct 2024 07:00  --------------------------------------------------------  IN: 900 mL / OUT: 0 mL / NET: 900 mL  --------------------------------------------------------------------------------------    PHYSICAL EXAM:  GENERAL: NAD  HEAD:  Atraumatic, normocephalic  EYES: EOMI, PERRLA, conjunctiva and sclera clear  NECK: Supple  CHEST/LUNG: nonlabored, good inspiratory effort  HEART: RRR. +S1/S2  ABDOMEN: Soft, nondistended  EXTREMITIES: No clubbing, cyanosis, or edema  PSYCH: A&O x3  NEUROLOGY: Non-focal, motor & sensory grossly intact  SKIN: Warm & dry, no rashes or lesions    LABS:                        13.7   14.50 )-----------( 337      ( 23 Oct 2024 09:25 )             44.2     10-23    134[L]  |  99  |  18  ----------------------------<  101[H]  4.1   |  22  |  1.07    Ca    8.9      23 Oct 2024 09:25  Phos  3.3     10-22  Mg     1.9     10-23    TPro  7.7  /  Alb  4.4  /  TBili  0.8  /  DBili  0.2  /  AST  13  /  ALT  15  /  AlkPhos  65  10-22    PT/INR - ( 22 Oct 2024 15:07 )   PT: 23.9 sec;   INR: 2.11 ratio    PTT - ( 22 Oct 2024 15:07 )  PTT:48.6 sec  Urinalysis Basic - ( 23 Oct 2024 09:25 )    Color: x / Appearance: x / SG: x / pH: x  Gluc: 101 mg/dL / Ketone: x  / Bili: x / Urobili: x   Blood: x / Protein: x / Nitrite: x   Leuk Esterase: x / RBC: x / WBC x   Sq Epi: x / Non Sq Epi: x / Bacteria: x

## 2024-10-23 NOTE — BH CONSULTATION LIAISON ASSESSMENT NOTE - NSICDXBHSECONDARYDX_PSY_ALL_CORE
Pulmonary hypertension   I27.20  Prophylactic measure   Z29.9  Right heart failure   I50.810  Asthma   J45.909  Leukocytosis   D72.829  Pulmonary thromboembolism   [id number]  Chronic respiratory failure with hypoxia   J96.11  Epigastric pain   R10.13  Bradycardia   R00.1  Migraine headache   G43.909  History of DVT in adulthood   Z86.718  Pulmonary hypertension with acute cor pulmonale   I26.09  Palpitations   R00.2  Pulmonary embolism   I26.99  Vaginal bleeding   N93.9  Pre-transplant evaluation for lung transplant   Z01.818  Fever   R50.9

## 2024-10-23 NOTE — CONSULT NOTE ADULT - ASSESSMENT
#Full body skin examination (FBE) to assess for cutaneous malignancy  - There were no lesions clinically concerning for malignancy  - Growth on L anterior thigh clinically benign appearing  - Due to inadequate lighting in the hospital setting and limitations with ambulation/movement, subtle skin cancers may be missed during skin cancer screening performed in the hospital.  - Discussed that post transplant immunosuppression may increase risk of cutaneous malignancy. Yearly skin exams post transplant recommended    Thank you for allowing us to participate in the care of this pt. Dermatology to sign off at this time. Please notify us and re-consult as needed for new or concerning changes to skin exam      The patient's chart was reviewed in addition to being seen and examined at bedside with the dermatology attending Dr. Peralta .  Recommendations were communicated with the primary team.  Please page [phone] with a 10-digit call-back number for further related questions.    _______________ Thank you for allowing us to participate in the care of this patient.  Please alert Dermatology for any new or worsening developments.    Angeli Rowell MD  Resident Physician, PGY-2  [facility] Dermatology  Pager: [phone]  Office: [phone] #Full body skin examination (FBE) to assess for cutaneous malignancy  - There were no lesions clinically concerning for malignancy  - Growth on L anterior thigh clinically benign appearing  - Discussed that post transplant immunosuppression may increase risk of cutaneous malignancy. Yearly skin exams post transplant recommended    Thank you for allowing us to participate in the care of this pt. Dermatology to sign off at this time. Please notify us and re-consult as needed for new or concerning changes to skin exam      The patient's chart was reviewed in addition to being seen and examined at bedside with the dermatology attending Dr. Peralta .  Recommendations were communicated with the primary team.  Please page [phone] with a 10-digit call-back number for further related questions.    _______________ Thank you for allowing us to participate in the care of this patient.  Please alert Dermatology for any new or worsening developments.    Angeli Rowell MD  Resident Physician, PGY-2  [facility] Dermatology  Pager: [phone]  Office: [phone]

## 2024-10-23 NOTE — BH CONSULTATION LIAISON ASSESSMENT NOTE - NSBHCHARTREVIEWVS_PSY_A_CORE FT
Vital Signs Last 24 Hrs  T(C): 36.3 (23 Oct 2024 13:13), Max: 36.6 (22 Oct 2024 20:57)  T(F): 97.3 (23 Oct 2024 13:13), Max: 97.8 (22 Oct 2024 20:57)  HR: 94 (23 Oct 2024 13:13) (91 - 94)  BP: 103/73 (23 Oct 2024 13:13) (103/73 - 135/76)  BP(mean): --  RR: 18 (23 Oct 2024 13:13) (18 - 18)  SpO2: 98% (23 Oct 2024 13:13) (96% - 100%)    Parameters below as of 23 Oct 2024 13:13  Patient On (Oxygen Delivery Method): nasal cannula  O2 Flow (L/min): 2

## 2024-10-23 NOTE — CHART NOTE - NSCHARTNOTEFT_GEN_A_CORE
Short Physical Performance Battery Protocol (SPPB Protocol):      Total Balance Test score:     __4___ points   Gait Speed Test score:          __1___ points    Chair Stand Test score:        __2___ points      TOTAL SCORE:                       __7___ POINTS (SUM OF POINTS ABOVE)    6 Minute Walk Test (6MWT): Distance: _____ meters  Comments:     Karnofsky Index: Level of functional capacity: ___70__ % Short Physical Performance Battery Protocol (SPPB Protocol):      Total Balance Test score:     __4___ points   Gait Speed Test score:          __1___ points    Chair Stand Test score:        __2___ points      TOTAL SCORE:                       __7___ POINTS (SUM OF POINTS ABOVE)    6 Minute Walk Test (6MWT): Distance: __180___ meters  Comments:     Karnofsky Index: Level of functional capacity: ___70__ % · Note Type	Event Note  6MWT, Lung Frailty Assessment      Lung Frailty Assessment:    Standing balance:       Side: 10 sec       Semi-Tandem: 10 sec       Tandem: 10 sec      Gait Speed Test       First trial: 21.29 sec       Second Trial: 20.74 sec       Average: 21.015 sec    5x sit<>stand: 15.4 sec    6 Minute Walk Test (6MWT)    PRE  BP: 133/83  HR: 107 bpm  SpO2: 98%  Oxygen SettinL O2/min     Distance: _180_ meters  Time Completed: __6___ minutes __0__ seconds  Standing Rests: _4_     POST   BP: 123/79  HR: 134 bpm  SpO2: 93%  Oxygen SettinL O2/min        Total Balance Test score:     __4___ points   Gait Speed Test score:          __1___ points    Chair Stand Test score:        __2___ points      TOTAL SCORE:                       __7___ POINTS (SUM OF POINTS ABOVE)    6 Minute Walk Test (6MWT): Distance: __180___ meters  Comments:     Karnofsky Index: Level of functional capacity: ___70__ %

## 2024-10-23 NOTE — BH CONSULTATION LIAISON ASSESSMENT NOTE - DESCRIPTION
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 Has not worked since birth of son.

## 2024-10-23 NOTE — CONSULT NOTE ADULT - ATTENDING COMMENTS
Clinically benign nevi and seborrheic keratoses noted on exam. There is also a pedunculated nodule on the thigh which is clinically benign and is likely an acrochordon or nevus lipomatosus superficialis. May consider outpatient f/u for removal if interested. Recommend SPF 30 or higher sunscreen to applied every day.     Baseline skin cancer screening performed for lung transplant evaluation. There were no lesions clinically concerning for malignancy. Due to inadequate lighting in the hospital setting and limitations with ambulation/movement, subtle skin cancers may be missed during skin cancer screening performed in the hospital. Although adequate for a baseline, I recommend outpatient follow-up with me once discharged and stable, for a repeat skin cancer screening. I recommend a skin cancer screening at least once every year after transplant, as immunosuppression is a risk factor for developing skin cancer. Please do not hesitate to contact us if there are any further questions.    Kingsley Peralta MD, PharmD, MPH  Co-Director, Inpatient Dermatology Consultation Service, [facility] [place]

## 2024-10-23 NOTE — BH CONSULTATION LIAISON ASSESSMENT NOTE - ACCESS TO FIREARM
4/21/2017  Femi Osorio  3/16/1992  22year old   male  Wayne Bhatti MD    HPI:   Patient presents with:  Knee Pain: Bilateral - onset 2 years ago while working out he over worked his knees until they were burning  - has pain all around the front listed in the HPI. EXAM:     The patient is awake and oriented x 3 and overall well appearing. The patient has normal mood. The patient is non-tender and atraumatic with the exception of their bilateral knees.     The patient's skin is intact and citlali No

## 2024-10-23 NOTE — CONSULT NOTE ADULT - SUBJECTIVE AND OBJECTIVE BOX
Patient is a 44y old  Female who presents with a chief complaint of palpitation, worsening SOUZA and chest tightness. Dental consulted for dental clearance prior to lung transplant.  HPI:  44 year old woman with a history of pHTN (group I and IV on Rimodulin and Xarelto, on home 2L O2), c/b R heart failure, history of RA thrombus s/p thrombectomy, history of DVT/PE (2016), multiple abdominal wall abscesses (s/p I&D), asthma, bradycardia s/p dual chamber PPM. Patient presents with 2 days gradual onset palpitations and SOB on exertion, associated with midsternal chest tightness that is non-radiating. ROS additionally positive for epigastric pain. She states she usually had all these symptoms in the past for pHTN and asthma exacerbation. She endorse some baseline cough and sputum production but states it is not worse than usual. She denies fever, chills, dysuria, URI symptoms.  PAST MEDICAL & SURGICAL HISTORY:  Tachycardia  Anemia  Pulmonary hypertension  Pulmonary embolism  Asthma  On home O2 nightly at 2L via NC  PE (pulmonary thromboembolism)  on Xarelto 10 mg daily  Sinusitis  Corneal disorder  Right heart failure  CAD (coronary artery disease)  H/O pulmonary hypertension  Pulmonary embolism  Asthma  History of tachycardia  On supplemental oxygen by nasal cannula  O2 @ 2L  Pacemaker  Skin mass  left upper thigh mass  History of tubal ligation  Pacemaker  H/O tubal ligation  History of pacemaker  12/19 - [employer] model Ingevity 4469  MEDICATIONS  (STANDING):  buDESOnide    Inhalation Suspension 0.5 milliGRAM(s) Inhalation two times a day  buMETAnide 1 milliGRAM(s) Oral daily  heparin  Infusion.  Unit(s)/Hr (18 mL/Hr) IV Continuous <Continuous>  influenza   Vaccine 0.5 milliLiter(s) IntraMuscular once  ipratropium    for Nebulization 500 MICROGram(s) Nebulizer every 6 hours  montelukast 10 milliGRAM(s) Oral daily  mupirocin 2% Nasal 1 Application(s) Both Nostrils two times a day  NIFEdipine XL 30 milliGRAM(s) Oral <User Schedule>  pantoprazole    Tablet 40 milliGRAM(s) Oral before breakfast  predniSONE   Tablet 5 milliGRAM(s) Oral daily  sildenafil (REVATIO) 20 milliGRAM(s) Oral <User Schedule>  spironolactone 50 milliGRAM(s) Oral two times a day  traZODone 25 milliGRAM(s) Oral at bedtime  Treprostinil SubQ Continous Infusion 51 NANOGram(s)/kG/Min 0.056 mL/Hr (0.06 mL/Hr) IV Continuous <Continuous>  MEDICATIONS  (PRN):  acetaminophen     Tablet .. 650 milliGRAM(s) Oral every 6 hours PRN Temp greater or equal to 38C (100.4F), Mild Pain (1 - 3)  acetaminophen 325 mG/butalbital 50 mG/caffeine 40 mG 1 Tablet(s) Oral every 8 hours PRN Mild Pain (1 - 3)  aluminum hydroxide/magnesium hydroxide/simethicone Suspension 30 milliLiter(s) Oral every 4 hours PRN Dyspepsia  heparin   Injectable 8000 Unit(s) IV Push every 6 hours PRN For aPTT less than 40  heparin   Injectable 4000 Unit(s) IV Push every 6 hours PRN For aPTT between 40 - 57  hydrocortisone 1% Ointment 1 Application(s) Topical two times a day PRN Itching  SUMAtriptan 50 milliGRAM(s) Oral every 12 hours PRN Moderate to Severe Headache    Allergies  adhesives (Rash)  vancomycin (Rash)  penicillin (Rash)  FH: anemia  EOE:  TMJ ( -  ) clicks                     ( -  ) pops                     ( -  ) crepitus             Mandible FROM             Facial bones and MOM grossly intact             ( -  ) trismus             ( -  ) lymphadenopathy             ( -  ) swelling             (  - ) asymmetry             (  - ) palpation             (  - ) dyspnea             (  - ) dysphagia             ( -  ) loss of consciousness  IOE:  permanent dentition: multiple carious teeth           hard/soft palate:  (  - ) palatal torus, No pathology noted           tongue/FOM No pathology noted           labial/buccal mucosa No pathology noted           ( -  ) percussion           ( -  ) palpation           ( -  ) swelling            ( -  ) abscess           ( -  ) sinus tract  Dentition present: #1-16, 20-29, 31-32  Mobility: none  Caries: #1- O caries   #3- root tip. no swelling or pain on percussion. chronic root tip  #14- mesial caries  #16- mesial caries  *DENTAL RADIOGRAPHS: Panoramic radiograph. Unable to attain PA #3 due to patient's gag reflex.   *ASSESSMENT:  No signs of acute odontogenic infection based on clinical and radiographic exam. No evidence of swelling, abscess or fistula.  PROCEDURE: Limited clinical and radiographic exam performed with patient's verbal consent.    RECOMMENDATIONS:  1) Patient may proceed with lung transplant from a dental standpoint.   2) Dental F/U with outpatient dentist for comprehensive dental care.  Taylor Tee DDS #[id number]

## 2024-10-23 NOTE — BH CONSULTATION LIAISON ASSESSMENT NOTE - NSBHCHARTREVIEWLAB_PSY_A_CORE FT
13.7   14.50 )-----------( 337      ( 23 Oct 2024 09:25 )             44.2     10-23    134[L]  |  99  |  18  ----------------------------<  101[H]  4.1   |  22  |  1.07    Ca    8.9      23 Oct 2024 09:25  Phos  3.3     10-22  Mg     1.9     10-23    TPro  7.7  /  Alb  4.4  /  TBili  0.8  /  DBili  0.2  /  AST  13  /  ALT  15  /  AlkPhos  65  10-22

## 2024-10-23 NOTE — CONSULT NOTE ADULT - SUBJECTIVE AND OBJECTIVE BOX
Chief Complaint:  Patient is a 44y old  Female who presents with a chief complaint of palpitation, worsening SOUZA and chest tightness (23 Oct 2024 13:13)    HPI:  44F pHTN (group I and IV on Remodulin, PE (Xarelto), on home 2L O2), c/b RV failure, history of RA thrombus s/p thrombectomy, history of DVT/PE (2016), multiple abdominal wall abscesses (s/p I&D), asthma (chronic steroid use), bradycardia s/p dual chamber PPM. She presents with worsening palpitations and SOB on exertion, concerning for pHTN exacerbation complicated by right heart failure undergoing lung transplant evaluation. CT A/P showing 6mm PD dilation for which GI is consulted. Patient denies any abdominal pain, nausea/vomiting, weight loss. No FHx of GI cancers including pancreatic cancer. FHX of PUD.    Allergies:  adhesives (Rash)  vancomycin (Rash)  penicillin (Rash)      Home Medications:    Hospital Medications:  acetaminophen     Tablet .. 650 milliGRAM(s) Oral every 6 hours PRN  acetaminophen 325 mG/butalbital 50 mG/caffeine 40 mG 1 Tablet(s) Oral every 8 hours PRN  aluminum hydroxide/magnesium hydroxide/simethicone Suspension 30 milliLiter(s) Oral every 4 hours PRN  buDESOnide    Inhalation Suspension 0.5 milliGRAM(s) Inhalation two times a day  buMETAnide 1 milliGRAM(s) Oral daily  hydrocortisone 1% Ointment 1 Application(s) Topical two times a day PRN  influenza   Vaccine 0.5 milliLiter(s) IntraMuscular once  ipratropium    for Nebulization 500 MICROGram(s) Nebulizer every 6 hours  montelukast 10 milliGRAM(s) Oral daily  mupirocin 2% Nasal 1 Application(s) Both Nostrils two times a day  NIFEdipine XL 30 milliGRAM(s) Oral <User Schedule>  pantoprazole    Tablet 40 milliGRAM(s) Oral before breakfast  predniSONE   Tablet 5 milliGRAM(s) Oral daily  rivaroxaban 20 milliGRAM(s) Oral daily  sildenafil (REVATIO) 20 milliGRAM(s) Oral <User Schedule>  spironolactone 50 milliGRAM(s) Oral two times a day  SUMAtriptan 50 milliGRAM(s) Oral every 12 hours PRN  traZODone 25 milliGRAM(s) Oral at bedtime  Treprostinil SubQ Continous Infusion 51 NANOGram(s)/kG/Min 0.056 mL/Hr IV Continuous <Continuous>      PMHX/PSHX:  Asthma    Pulmonary hypertension    Tachycardia    Anemia    Pulmonary hypertension    Pulmonary embolism    Asthma    Sinus tachycardia    Asthma with status asthmaticus, unspecified asthma severity    PE (pulmonary thromboembolism)    Keratoconus    Sinusitis    Corneal disorder    Right heart failure    CAD (coronary artery disease)    H/O pulmonary hypertension    Pulmonary embolism    Asthma    History of tachycardia    On supplemental oxygen by nasal cannula    Pacemaker    Skin mass    History of tubal ligation    No significant past surgical history    Pacemaker    H/O tubal ligation    History of pacemaker        Family history:  Family history of diabetes mellitus    Family history of diabetes mellitus in mother    No pertinent family history in first degree relatives    FH: anemia    ROS:  General:  No wt loss, fevers, chills  ENT:  No dysphagia  CV:  No pain, palpitations  Pulm:  No dyspnea, cough  GI:  No pain, No nausea, No vomiting, No diarrhea, No constipation, No rectal bleeding, No tarry stools  Muscle:  No pain, weakness  Neuro:  No weakness  Heme:  No ecchymosis  Skin:  No rash    PHYSICAL EXAM:  GENERAL:  No acute distress  HEENT:  no scleral icterus  CHEST:  no accessory muscle use; 2LNC  HEART:  Regular rate and rhythm  ABDOMEN:  Soft, non-tender, non-distended  EXTREMITIES: No edema  SKIN:  No rash/ecchymoses  NEURO:  Alert and oriented x 3    Vital Signs:  Vital Signs Last 24 Hrs  T(C): 36.3 (23 Oct 2024 13:13), Max: 36.6 (22 Oct 2024 20:57)  T(F): 97.3 (23 Oct 2024 13:13), Max: 97.8 (22 Oct 2024 20:57)  HR: 101 (23 Oct 2024 14:30) (91 - 101)  BP: 127/85 (23 Oct 2024 14:30) (103/73 - 135/76)  BP(mean): --  RR: 18 (23 Oct 2024 13:13) (18 - 18)  SpO2: 98% (23 Oct 2024 14:30) (96% - 100%)    Parameters below as of 23 Oct 2024 14:30  Patient On (Oxygen Delivery Method): nasal cannula  O2 Flow (L/min): 2    Daily     Daily Weight in k.5 (23 Oct 2024 10:39)    LABS:                        13.7   14.50 )-----------( 337      ( 23 Oct 2024 09:25 )             44.2     Mean Cell Volume: 68.2 fl (10-23- @ 09:25)    10-23    134[L]  |  99  |  18  ----------------------------<  101[H]  4.1   |  22  |  1.07    Ca    8.9      23 Oct 2024 09:25  Phos  3.3     10-22  Mg     1.9     10-23    TPro  7.7  /  Alb  4.4  /  TBili  0.8  /  DBili  0.2  /  AST  13  /  ALT  15  /  AlkPhos  65  10-22    LIVER FUNCTIONS - ( 22 Oct 2024 15:11 )  Alb: x     / Pro: x     / ALK PHOS: x     / ALT: x     / AST: x     / GGT: 36 U/L       PT/INR - ( 22 Oct 2024 15:07 )   PT: 23.9 sec;   INR: 2.11 ratio         PTT - ( 22 Oct 2024 15:07 )  PTT:48.6 sec  Urinalysis Basic - ( 23 Oct 2024 09:25 )    Color: x / Appearance: x / SG: x / pH: x  Gluc: 101 mg/dL / Ketone: x  / Bili: x / Urobili: x   Blood: x / Protein: x / Nitrite: x   Leuk Esterase: x / RBC: x / WBC x   Sq Epi: x / Non Sq Epi: x / Bacteria: x                              13.7   14.50 )-----------( 337      ( 23 Oct 2024 09:25 )             44.2                         13.5   11.05 )-----------( 305      ( 22 Oct 2024 09:03 )             44.2                         14.9   13.72 )-----------( 354      ( 21 Oct 2024 08:42 )             47.6       Imaging:  FINDINGS:  Evaluation of the solid organs and vasculature is limited without   intravenous contrast.    CHEST:  LUNGS AND LARGE AIRWAYS: Patent central airways. Bilateral groundglass   opacities, unchanged.  PLEURA: No pleural effusion.  VESSELS: Dilated main, right and left pulmonary arteries, unchanged.  HEART: Heart size is normal. Trace pericardial effusion, unchanged.  MEDIASTINUM AND ZEN: No lymphadenopathy.  CHEST WALL AND LOWER NECK: Left chest wall cardiac device with leads   terminating in the right atrium and right ventricle.    ABDOMEN AND PELVIS:  LIVER: Within normal limits.  BILE DUCTS: Normal caliber.  GALLBLADDER: Within normal limits.  SPLEEN: Previously seen splenic hypodensity not well seen on noncontrast   exam.  PANCREAS: New pancreatic ductal dilatation measuring up to 6 mm.  ADRENALS: Within normal limits.  KIDNEYS/URETERS: Within normal limits.    BLADDER: Within normal limits.  REPRODUCTIVE ORGANS: Intrauterine device.    BOWEL: No bowel obstruction. Scattered colonic diverticulosis. Appendix   is normal. Small hiatal hernia.  PERITONEUM/RETROPERITONEUM: Within normal limits.  VESSELS: Within normal limits.  LYMPH NODES: No lymphadenopathy.  ABDOMINAL WALL: Within normal limits.  BONES: Within normal limits.    IMPRESSION:  Unchanged bilateral groundglass opacities, may be related to pulmonary   hypertension.    New pancreatic ductal dilatation. Recommend contrast-enhanced MRI/MRCP   for further evaluation.

## 2024-10-24 ENCOUNTER — RESULT REVIEW (OUTPATIENT)
Age: 44
End: 2024-10-24

## 2024-10-24 LAB
ANA TITR SER: NEGATIVE — SIGNIFICANT CHANGE UP
ANION GAP SERPL CALC-SCNC: 16 MMOL/L — SIGNIFICANT CHANGE UP (ref 5–17)
APTT BLD: 138.5 SEC — CRITICAL HIGH (ref 24.5–35.6)
APTT BLD: >200 SEC — CRITICAL HIGH (ref 24.5–35.6)
B BURGDOR C6 AB SER-ACNC: NEGATIVE — SIGNIFICANT CHANGE UP
B BURGDOR IGG+IGM SER-ACNC: 0.19 INDEX — SIGNIFICANT CHANGE UP (ref 0.01–0.9)
BASOPHILS # BLD AUTO: 0.09 K/UL — SIGNIFICANT CHANGE UP (ref 0–0.2)
BASOPHILS NFR BLD AUTO: 0.8 % — SIGNIFICANT CHANGE UP (ref 0–2)
BUN SERPL-MCNC: 18 MG/DL — SIGNIFICANT CHANGE UP (ref 7–23)
C IMMITIS AB SER QL IA: NEGATIVE — SIGNIFICANT CHANGE UP
CALCIUM SERPL-MCNC: 8.5 MG/DL — SIGNIFICANT CHANGE UP (ref 8.4–10.5)
CHLORIDE SERPL-SCNC: 98 MMOL/L — SIGNIFICANT CHANGE UP (ref 96–108)
CMV IGG FLD QL: >10 U/ML — HIGH
CMV IGG SERPL-IMP: POSITIVE
CMV IGM FLD-ACNC: <8 AU/ML — SIGNIFICANT CHANGE UP
CMV IGM SERPL QL: NEGATIVE — SIGNIFICANT CHANGE UP
CO2 SERPL-SCNC: 22 MMOL/L — SIGNIFICANT CHANGE UP (ref 22–31)
CREAT SERPL-MCNC: 1.07 MG/DL — SIGNIFICANT CHANGE UP (ref 0.5–1.3)
EBV EA AB SER IA-ACNC: 39.5 U/ML — HIGH
EBV EA AB TITR SER IF: POSITIVE
EBV EA IGG SER-ACNC: POSITIVE
EBV NA IGG SER IA-ACNC: 152 U/ML — HIGH
EBV PATRN SPEC IB-IMP: SIGNIFICANT CHANGE UP
EBV VCA IGG AVIDITY SER QL IA: POSITIVE
EBV VCA IGM SER IA-ACNC: <10 U/ML — SIGNIFICANT CHANGE UP
EBV VCA IGM SER IA-ACNC: >750 U/ML — HIGH
EBV VCA IGM TITR FLD: NEGATIVE — SIGNIFICANT CHANGE UP
EGFR: 66 ML/MIN/1.73M2 — SIGNIFICANT CHANGE UP
EOSINOPHIL # BLD AUTO: 0.21 K/UL — SIGNIFICANT CHANGE UP (ref 0–0.5)
EOSINOPHIL NFR BLD AUTO: 1.9 % — SIGNIFICANT CHANGE UP (ref 0–6)
FOLATE SERPL-MCNC: 2.4 NG/ML — LOW
GLUCOSE SERPL-MCNC: 137 MG/DL — HIGH (ref 70–99)
HCT VFR BLD CALC: 42.4 % — SIGNIFICANT CHANGE UP (ref 34.5–45)
HCT VFR BLD CALC: 43.2 % — SIGNIFICANT CHANGE UP (ref 34.5–45)
HGB BLD-MCNC: 13 G/DL — SIGNIFICANT CHANGE UP (ref 11.5–15.5)
HGB BLD-MCNC: 13.3 G/DL — SIGNIFICANT CHANGE UP (ref 11.5–15.5)
HSV1 IGG SER-ACNC: 2.07 INDEX — HIGH
HSV1 IGG SERPL QL IA: POSITIVE
HSV2 IGG FLD-ACNC: 0.06 INDEX — SIGNIFICANT CHANGE UP
HSV2 IGG SERPL QL IA: NEGATIVE — SIGNIFICANT CHANGE UP
IMM GRANULOCYTES NFR BLD AUTO: 0.5 % — SIGNIFICANT CHANGE UP (ref 0–0.9)
LYMPHOCYTES # BLD AUTO: 1.55 K/UL — SIGNIFICANT CHANGE UP (ref 1–3.3)
LYMPHOCYTES # BLD AUTO: 13.8 % — SIGNIFICANT CHANGE UP (ref 13–44)
MCHC RBC-ENTMCNC: 21.1 PG — LOW (ref 27–34)
MCHC RBC-ENTMCNC: 21.6 PG — LOW (ref 27–34)
MCHC RBC-ENTMCNC: 30.7 GM/DL — LOW (ref 32–36)
MCHC RBC-ENTMCNC: 30.8 GM/DL — LOW (ref 32–36)
MCV RBC AUTO: 68.8 FL — LOW (ref 80–100)
MCV RBC AUTO: 70.1 FL — LOW (ref 80–100)
MEV IGG SER-ACNC: 78.5 AU/ML — SIGNIFICANT CHANGE UP
MEV IGG+IGM SER-IMP: POSITIVE — SIGNIFICANT CHANGE UP
MONOCYTES # BLD AUTO: 0.71 K/UL — SIGNIFICANT CHANGE UP (ref 0–0.9)
MONOCYTES NFR BLD AUTO: 6.3 % — SIGNIFICANT CHANGE UP (ref 2–14)
MUV AB SER-ACNC: POSITIVE — SIGNIFICANT CHANGE UP
MUV IGG FLD-ACNC: 34.8 AU/ML — SIGNIFICANT CHANGE UP
NEUTROPHILS # BLD AUTO: 8.58 K/UL — HIGH (ref 1.8–7.4)
NEUTROPHILS NFR BLD AUTO: 76.7 % — SIGNIFICANT CHANGE UP (ref 43–77)
NRBC # BLD: 0 /100 WBCS — SIGNIFICANT CHANGE UP (ref 0–0)
NRBC # BLD: 0 /100 WBCS — SIGNIFICANT CHANGE UP (ref 0–0)
PLATELET # BLD AUTO: 306 K/UL — SIGNIFICANT CHANGE UP (ref 150–400)
PLATELET # BLD AUTO: 331 K/UL — SIGNIFICANT CHANGE UP (ref 150–400)
POTASSIUM SERPL-MCNC: 3.3 MMOL/L — LOW (ref 3.5–5.3)
POTASSIUM SERPL-SCNC: 3.3 MMOL/L — LOW (ref 3.5–5.3)
RBC # BLD: 6.16 M/UL — HIGH (ref 3.8–5.2)
RBC # BLD: 6.16 M/UL — HIGH (ref 3.8–5.2)
RBC # FLD: 19.8 % — HIGH (ref 10.3–14.5)
RBC # FLD: 19.9 % — HIGH (ref 10.3–14.5)
SODIUM SERPL-SCNC: 136 MMOL/L — SIGNIFICANT CHANGE UP (ref 135–145)
T CRUZI AB SER-ACNC: SIGNIFICANT CHANGE UP
T PALLIDUM AB TITR SER: NEGATIVE — SIGNIFICANT CHANGE UP
VIT B12 SERPL-MCNC: 251 PG/ML — SIGNIFICANT CHANGE UP (ref 232–1245)
VIT D25+D1,25 OH+D1,25 PNL SERPL-MCNC: 43 PG/ML — SIGNIFICANT CHANGE UP (ref 19.9–79.3)
VZV IGG FLD QL IA: 3.8 S/CO — SIGNIFICANT CHANGE UP
VZV IGG FLD QL IA: POSITIVE — SIGNIFICANT CHANGE UP
WBC # BLD: 11.2 K/UL — HIGH (ref 3.8–10.5)
WBC # BLD: 13.49 K/UL — HIGH (ref 3.8–10.5)
WBC # FLD AUTO: 11.2 K/UL — HIGH (ref 3.8–10.5)
WBC # FLD AUTO: 13.49 K/UL — HIGH (ref 3.8–10.5)

## 2024-10-24 PROCEDURE — 74177 CT ABD & PELVIS W/CONTRAST: CPT | Mod: 26

## 2024-10-24 PROCEDURE — 99232 SBSQ HOSP IP/OBS MODERATE 35: CPT

## 2024-10-24 PROCEDURE — 78472 GATED HEART PLANAR SINGLE: CPT | Mod: 26

## 2024-10-24 PROCEDURE — 93308 TTE F-UP OR LMTD: CPT | Mod: 26

## 2024-10-24 RX ORDER — POTASSIUM CHLORIDE 10 MEQ
40 TABLET, EXTENDED RELEASE ORAL EVERY 4 HOURS
Refills: 0 | Status: DISCONTINUED | OUTPATIENT
Start: 2024-10-24 | End: 2024-10-30

## 2024-10-24 RX ORDER — ENOXAPARIN SODIUM 40MG/0.4ML
100 SYRINGE (ML) SUBCUTANEOUS EVERY 12 HOURS
Refills: 0 | Status: DISCONTINUED | OUTPATIENT
Start: 2024-10-24 | End: 2024-10-24

## 2024-10-24 RX ORDER — ENOXAPARIN SODIUM 40MG/0.4ML
100 SYRINGE (ML) SUBCUTANEOUS ONCE
Refills: 0 | Status: COMPLETED | OUTPATIENT
Start: 2024-10-24 | End: 2024-10-24

## 2024-10-24 RX ADMIN — Medication 20 MILLIGRAM(S): at 18:15

## 2024-10-24 RX ADMIN — HEPARIN SODIUM 1500 UNIT(S)/HR: 10000 INJECTION INTRAVENOUS; SUBCUTANEOUS at 02:35

## 2024-10-24 RX ADMIN — BUDESONIDE 0.5 MILLIGRAM(S): 3 CAPSULE ORAL at 06:28

## 2024-10-24 RX ADMIN — BUDESONIDE 0.5 MILLIGRAM(S): 3 CAPSULE ORAL at 18:20

## 2024-10-24 RX ADMIN — Medication 30 MILLIGRAM(S): at 12:55

## 2024-10-24 RX ADMIN — HEPARIN SODIUM 1500 UNIT(S)/HR: 10000 INJECTION INTRAVENOUS; SUBCUTANEOUS at 07:31

## 2024-10-24 RX ADMIN — PANTOPRAZOLE SODIUM 40 MILLIGRAM(S): 40 TABLET, DELAYED RELEASE ORAL at 06:27

## 2024-10-24 RX ADMIN — Medication 40 MILLIEQUIVALENT(S): at 18:14

## 2024-10-24 RX ADMIN — Medication 100 MILLIGRAM(S): at 12:54

## 2024-10-24 RX ADMIN — Medication 50 MILLIGRAM(S): at 18:15

## 2024-10-24 RX ADMIN — Medication 5 MILLIGRAM(S): at 06:27

## 2024-10-24 RX ADMIN — IPRATROPIUM BROMIDE 500 MICROGRAM(S): 0.5 SOLUTION RESPIRATORY (INHALATION) at 06:26

## 2024-10-24 RX ADMIN — Medication 1 MILLIGRAM(S): at 06:27

## 2024-10-24 RX ADMIN — Medication 20 MILLIGRAM(S): at 08:00

## 2024-10-24 RX ADMIN — MONTELUKAST SODIUM 10 MILLIGRAM(S): 10 TABLET, FILM COATED ORAL at 12:54

## 2024-10-24 RX ADMIN — IPRATROPIUM BROMIDE 500 MICROGRAM(S): 0.5 SOLUTION RESPIRATORY (INHALATION) at 18:15

## 2024-10-24 RX ADMIN — Medication 50 MILLIGRAM(S): at 06:27

## 2024-10-24 RX ADMIN — TRAZODONE HYDROCHLORIDE 25 MILLIGRAM(S): 100 TABLET ORAL at 22:00

## 2024-10-24 RX ADMIN — HEPARIN SODIUM 0 UNIT(S)/HR: 10000 INJECTION INTRAVENOUS; SUBCUTANEOUS at 01:33

## 2024-10-24 NOTE — PROVIDER CONTACT NOTE (CRITICAL VALUE NOTIFICATION) - BACKGROUND
PMH: Pulm htn, right HF, bradycardia s/p PPM
Patient is scheduled for cardiac cath 10/18.
Dx; Pulm htn: s/p RHC/ LHC 10/18. Pt on 2L NC & Xarelto for PE/DVT now on heparin gtt started 10/23.

## 2024-10-24 NOTE — PROGRESS NOTE ADULT - ASSESSMENT
44 year old woman with a history of pHTN (group I and IV on Rimodulin and Xarelto, on home 2L O2), c/b R heart failure, history of RA thrombus s/p thrombectomy, history of DVT/PE (2016), multiple abdominal wall abscesses (s/p I&D), asthma, bradycardia s/p dual chamber PPM. Patient presents with 2 days gradual onset palpitations and SOB on exertion, associated with midsternal chest tightness that is non-radiating. ROS additionally positive for epigastric pain. She states she usually had all these symptoms in the past for pHTN and asthma exacerbation. She endorse some baseline cough and sputum production but states it is not worse than usual. She denies fever, chills, dysuria, URI symptoms.    RVP (10/14, 10/23) Negative  BCx (10/14) NGTD  UA (10/22) Negative Leukocyte Esterase  Urine Legionella Ag (10/22) Negative    MRSA/MSSA Nasal PCR (10/22) Positive MRSA    CT Chest (10/23) Unchanged bilateral groundglass opacities, may be related to pulmonary hypertension.    CT A/P (10/23) New pancreatic ductal dilatation. Recommend contrast-enhanced MRI/MRCP for further evaluation.    #Fever, Abnormal CT Chest  --Monitor off of antibiotics for now  --If further fevers would initiate Vancomycin + Cefepime  --Continue to follow CBC with diff  --Continue to follow transaminases  --Continue to follow temperature curve  --Follow up on preliminary blood cultures  --Follow up on serum fungitell    #Pre-Lung Transplant Evaluation  COVID19 Nucleocapsid Antibody Negative  COVID19 Rajinder Antibody Positive  HAV IgG Negative  HBVs Ab Negative  HBVsAg Negative  HBVc Ab Negative  HCV Ab Negative  HSV 1 IgG Positive  HSV 2 IgG Negative  EBV IgG Positive  CMV IgG Positive  VZV IgG Positive  Measles IgG Positive  Mumps IgG Positive  Rubella IgG Ordered Pending Collection  Quantiferon Gold PENDING  HIV Ag/Ab by [facility]  Syphilis Screen Negative  Toxoplasma IgG Ordered Pending Collection  Strongyloides Ab PENDING  Trypanosoma cruzi Ab PENDING  --ID Clearance for transplant pending quantiferon gold     #Encounter to Vaccinate Patient  COVID19: Would benefit from COVID19 [id number] Vaccine Dose  Influenza: Will require  RSV: Will require  Pneumococcal: States she had pneumococcal vaccination ~2022  HAV: Would benefit from Havrix  HBV: Would benefit from Heplisav  MMR: Rubella IgG pending. Mumps and Measles immune  Varicella: Immune will not require further vaccination  Shingles: Will require Shingrix  Tdap: Will require Tdap    I will continue to follow. Please feel free to contact me with any further questions.    Rogelio Gonsales M.D.  [facility] Division of Infectious Disease  8AM-5PM Monday - Friday: Available on [employer] Teams  After Hours and Holidays (or if no response on [employer] Teams): Please contact the Infectious Diseases Office at [phone]

## 2024-10-24 NOTE — CONSULT NOTE ADULT - ATTENDING COMMENTS
Consulted for perioperative cardiac risk stratification prior to EGD. She tolerates <4 METs though this is likely attributable to pulmonary hypertension. Denies chest pain. She is not in ACS. No prior issues with conscious sedation or general anesthesia. Given that this is a low risk procedure no further workup is indicated. She is at elevated but acceptable cardiac risk. Recommend cardiac anesthesia manage sedation during the case.    Nakul Chavis MD  Cardiologist, [facility] Cardiology and Cardiovascular Surgery on-service contact/call information, go to [email or web address] and use "[email or web address]" to login. Consulted for perioperative cardiac risk stratification prior to EGD. She tolerates <4 METs though this is likely attributable to pulmonary hypertension. Denies chest pain. She is not in ACS. No prior issues with conscious sedation or general anesthesia. Given that this is a low risk procedure no further workup is indicated. She is at elevated but acceptable cardiac risk. Recommend cardiac anesthesia manage sedation during the case.    We will sign off for now. Please do not hesitate to reach out with any questions/concerns. Thank you for this interesting consult.     Nakul Chavis MD  Cardiologist, [facility] Cardiology and Cardiovascular Surgery on-service contact/call information, go to [email or web address] and use "[email or web address]" to login.

## 2024-10-24 NOTE — PROGRESS NOTE ADULT - SUBJECTIVE AND OBJECTIVE BOX
[facility] Division of Hospital Medicine  Elvie Fraser MD  Available via MS Teams    SUBJECTIVE / OVERNIGHT EVENTS:  Pt c/o feeling something stuck in her mid upper sternal chest. Only happens during hospitalizations. No choking. Thinks its related to food, however will get sensation during non meal times. No other complaints.    MEDICATIONS  (STANDING):  buDESOnide    Inhalation Suspension 0.5 milliGRAM(s) Inhalation two times a day  buMETAnide 1 milliGRAM(s) Oral daily  influenza   Vaccine 0.5 milliLiter(s) IntraMuscular once  ipratropium    for Nebulization 500 MICROGram(s) Nebulizer every 6 hours  montelukast 10 milliGRAM(s) Oral daily  mupirocin 2% Nasal 1 Application(s) Both Nostrils two times a day  NIFEdipine XL 30 milliGRAM(s) Oral <User Schedule>  pantoprazole    Tablet 40 milliGRAM(s) Oral before breakfast  potassium chloride    Tablet ER 40 milliEquivalent(s) Oral every 4 hours  predniSONE   Tablet 5 milliGRAM(s) Oral daily  sildenafil (REVATIO) 20 milliGRAM(s) Oral <User Schedule>  spironolactone 50 milliGRAM(s) Oral two times a day  traZODone 25 milliGRAM(s) Oral at bedtime  Treprostinil SubQ Continous Infusion 51 NANOGram(s)/kG/Min 0.056 mL/Hr (0.06 mL/Hr) IV Continuous <Continuous>    MEDICATIONS  (PRN):  acetaminophen     Tablet .. 650 milliGRAM(s) Oral every 6 hours PRN Temp greater or equal to 38C (100.4F), Mild Pain (1 - 3)  acetaminophen 325 mG/butalbital 50 mG/caffeine 40 mG 1 Tablet(s) Oral every 8 hours PRN Mild Pain (1 - 3)  aluminum hydroxide/magnesium hydroxide/simethicone Suspension 30 milliLiter(s) Oral every 4 hours PRN Dyspepsia  hydrocortisone 1% Ointment 1 Application(s) Topical two times a day PRN Itching  SUMAtriptan 50 milliGRAM(s) Oral every 12 hours PRN Moderate to Severe Headache      I&O's Summary    23 Oct 2024 07:01  -  24 Oct 2024 07:00  --------------------------------------------------------  IN: 1070 mL / OUT: 850 mL / NET: 220 mL    24 Oct 2024 07:01  -  24 Oct 2024 13:55  --------------------------------------------------------  IN: 240 mL / OUT: 600 mL / NET: -360 mL        PHYSICAL EXAM:  Vital Signs Last 24 Hrs  T(C): 36.8 (24 Oct 2024 04:52), Max: 36.8 (24 Oct 2024 04:52)  T(F): 98.2 (24 Oct 2024 04:52), Max: 98.2 (24 Oct 2024 04:52)  HR: 98 (24 Oct 2024 04:52) (98 - 102)  BP: 100/68 (24 Oct 2024 04:52) (100/68 - 127/85)  BP(mean): --  RR: 18 (24 Oct 2024 04:52) (18 - 18)  SpO2: 98% (24 Oct 2024 04:52) (97% - 98%)    Parameters below as of 24 Oct 2024 04:52  Patient On (Oxygen Delivery Method): nasal cannula  O2 Flow (L/min): 2    CONSTITUTIONAL: NAD  EYES: EOMI  ENMT: Moist oral mucosa  RESPIRATORY: Normal respiratory effort; scattered rhonchi  CARDIOVASCULAR: Regular rate and rhythm, no murmurs, no LE edema  ABDOMEN: Nontender to palpation, normoactive bowel sounds, no rebound/guarding  MUSCULOSKELETAL:  no joint swelling or tenderness to palpation  PSYCH: A+O to person, place, and time; affect appropriate  NEUROLOGY: strength and sensation grossly intact  SKIN: No rashes; no palpable lesions    LABS:                        13.0   11.20 )-----------( 331      ( 24 Oct 2024 09:45 )             42.4     10-24    136  |  98  |  18  ----------------------------<  137[H]  3.3[L]   |  22  |  1.07    Ca    8.5      24 Oct 2024 09:46  Phos  3.3     10-22  Mg     1.9     10-23    TPro  7.7  /  Alb  4.4  /  TBili  0.8  /  DBili  0.2  /  AST  13  /  ALT  15  /  AlkPhos  65  10-22    PT/INR - ( 22 Oct 2024 15:07 )   PT: 23.9 sec;   INR: 2.11 ratio         PTT - ( 24 Oct 2024 09:45 )  PTT:138.5 sec      Urinalysis Basic - ( 24 Oct 2024 09:46 )    Color: x / Appearance: x / SG: x / pH: x  Gluc: 137 mg/dL / Ketone: x  / Bili: x / Urobili: x   Blood: x / Protein: x / Nitrite: x   Leuk Esterase: x / RBC: x / WBC x   Sq Epi: x / Non Sq Epi: x / Bacteria: x        SARS-CoV-2: NotDetec (23 Oct 2024 18:12)  SARS-CoV-2: NotDetec (14 Oct 2024 13:58)      RADIOLOGY & ADDITIONAL TESTS:  New Imaging Personally Reviewed Today:  New Electrocardiogram Personally Reviewed Today:  Other Results Reviewed Today:   Prior or Outpatient Records Reviewed Today with Summary:    COORDINATION OF CARE:  Consultant Communication and Details of Discussion (where applicable):

## 2024-10-24 NOTE — CONSULT NOTE ADULT - CONSULT REQUESTED DATE/TIME
14-Oct-2024 14:11
21-Oct-2024 16:15
23-Oct-2024 17:18
23-Oct-2024 13:14
22-Oct-2024 15:06
24-Oct-2024 13:25
16-Oct-2024 16:29
23-Oct-2024
16-Oct-2024 16:57
23-Oct-2024 17:20

## 2024-10-24 NOTE — PROVIDER CONTACT NOTE (CRITICAL VALUE NOTIFICATION) - SITUATION
Patient on heparin for history of  DVT/PE.
SHY Gamino saw PTT result at 15:30, before lab called with the results. Patient started on heparin gtt had PTT over 200. Phlebotomy mary the lab properly.
aPTT >200

## 2024-10-24 NOTE — PROVIDER CONTACT NOTE (CRITICAL VALUE NOTIFICATION) - ASSESSMENT
pt is alert and oriented x4, able to make needs known. heparin gtt running at 18 cc/hr, aPTT supratherapeutic >200. no s/s bleeding or hematoma. pt denies pain, discomfort, SOB, or palpitations at this time.
Patient asymptomatic
Patient without any concerns at this time, patient asymptomatic.

## 2024-10-24 NOTE — PROGRESS NOTE ADULT - SUBJECTIVE AND OBJECTIVE BOX
Follow Up:      Interval History:    REVIEW OF SYSTEMS  [  ] ROS unobtainable because:    [  ] All other systems negative except as noted below    Constitutional:  [ ] fever [ ] chills  [ ] weight loss  [ ] weakness  Skin:  [ ] rash [ ] phlebitis	  Eyes: [ ] icterus [ ] pain  [ ] discharge	  ENMT: [ ] sore throat  [ ] thrush [ ] ulcers [ ] exudates  Respiratory: [ ] dyspnea [ ] hemoptysis [ ] cough [ ] sputum	  Cardiovascular:  [ ] chest pain [ ] palpitations [ ] edema	  Gastrointestinal:  [ ] nausea [ ] vomiting [ ] diarrhea [ ] constipation [ ] pain	  Genitourinary:  [ ] dysuria [ ] frequency [ ] hematuria [ ] discharge [ ] flank pain  [ ] incontinence  Musculoskeletal:  [ ] myalgias [ ] arthralgias [ ] arthritis  [ ] back pain  Neurological:  [ ] headache [ ] seizures  [ ] confusion/altered mental status    Allergies  adhesives (Rash)  vancomycin (Rash)  penicillin (Rash)        ANTIMICROBIALS:      OTHER MEDS:  MEDICATIONS  (STANDING):  acetaminophen     Tablet .. 650 every 6 hours PRN  acetaminophen 325 mG/butalbital 50 mG/caffeine 40 mG 1 every 8 hours PRN  aluminum hydroxide/magnesium hydroxide/simethicone Suspension 30 every 4 hours PRN  buDESOnide    Inhalation Suspension 0.5 two times a day  buMETAnide 1 daily  influenza   Vaccine 0.5 once  ipratropium    for Nebulization 500 every 6 hours  montelukast 10 daily  NIFEdipine XL 30 <User Schedule>  pantoprazole    Tablet 40 before breakfast  predniSONE   Tablet 5 daily  sildenafil (REVATIO) 20 <User Schedule>  spironolactone 50 two times a day  SUMAtriptan 50 every 12 hours PRN  traZODone 25 at bedtime      Vital Signs Last 24 Hrs  T(C): 36.8 (24 Oct 2024 04:52), Max: 36.8 (24 Oct 2024 04:52)  T(F): 98.2 (24 Oct 2024 04:52), Max: 98.2 (24 Oct 2024 04:52)  HR: 98 (24 Oct 2024 04:52) (98 - 102)  BP: 100/68 (24 Oct 2024 04:52) (100/68 - 103/67)  BP(mean): --  RR: 18 (24 Oct 2024 04:52) (18 - 18)  SpO2: 98% (24 Oct 2024 04:52) (97% - 98%)    Parameters below as of 24 Oct 2024 04:52  Patient On (Oxygen Delivery Method): nasal cannula  O2 Flow (L/min): 2      PHYSICAL EXAMINATION:  General: Alert and Awake, NAD  HEENT: PERRL, EOMI  Neck: Supple  Cardiac: RRR, No M/R/G  Resp: CTAB, No Wh/Rh/Ra  Abdomen: NBS, NT/ND, No HSM, No rigidity or guarding  MSK: No LE edema. No Calf tenderness  : No lucas  Skin: No rashes or lesions. Skin is warm and dry to the touch.   Neuro: Alert and Awake. CN 2-12 Grossly intact. Moves all four extremities spontaneously.  Psych: Calm, Pleasant, Cooperative                          13.0   11.20 )-----------( 331      ( 24 Oct 2024 09:45 )             42.4       10-24    136  |  98  |  18  ----------------------------<  137[H]  3.3[L]   |  22  |  1.07    Ca    8.5      24 Oct 2024 09:46  Phos  3.3     10-22  Mg     1.9     10-23    TPro  7.7  /  Alb  4.4  /  TBili  0.8  /  DBili  0.2  /  AST  13  /  ALT  15  /  AlkPhos  65  10-22      Urinalysis Basic - ( 24 Oct 2024 09:46 )    Color: x / Appearance: x / SG: x / pH: x  Gluc: 137 mg/dL / Ketone: x  / Bili: x / Urobili: x   Blood: x / Protein: x / Nitrite: x   Leuk Esterase: x / RBC: x / WBC x   Sq Epi: x / Non Sq Epi: x / Bacteria: x        MICROBIOLOGY:  v  .Blood BLOOD  10-14-24   No growth at 5 days  --  --      .Blood BLOOD  10-14-24   No growth at 5 days  --  --        HIV-1 RNA Quantitative, Viral Load Log: NOT DET. lg /mL (10-22-24 @ 17:39)  CMV IgG Antibody: >10.00 U/mL (10-22-24 @ 15:11)    Rapid RVP Result: NotDetec (10-23 @ 18:12)        RADIOLOGY:    <The imaging below has been reviewed and visualized by me independently. Findings as detailed in report below> Follow Up:  pre-Lung Transplant    Interval History: afebrile overnight. no acute events.     REVIEW OF SYSTEMS  [  ] ROS unobtainable because:    [ x ] All other systems negative except as noted below    Constitutional:  [ ] fever [ ] chills  [ ] weight loss  [ ] weakness  Skin:  [ ] rash [ ] phlebitis	  Eyes: [ ] icterus [ ] pain  [ ] discharge	  ENMT: [ ] sore throat  [ ] thrush [ ] ulcers [ ] exudates  Respiratory: [ ] dyspnea [ ] hemoptysis [ ] cough [ ] sputum	  Cardiovascular:  [ ] chest pain [ ] palpitations [ ] edema	  Gastrointestinal:  [ ] nausea [ ] vomiting [ ] diarrhea [ ] constipation [ ] pain	  Genitourinary:  [ ] dysuria [ ] frequency [ ] hematuria [ ] discharge [ ] flank pain  [ ] incontinence  Musculoskeletal:  [ ] myalgias [ ] arthralgias [ ] arthritis  [ ] back pain  Neurological:  [ ] headache [ ] seizures  [ ] confusion/altered mental status    Allergies  adhesives (Rash)  vancomycin (Rash)  penicillin (Rash)        ANTIMICROBIALS:      OTHER MEDS:  MEDICATIONS  (STANDING):  acetaminophen     Tablet .. 650 every 6 hours PRN  acetaminophen 325 mG/butalbital 50 mG/caffeine 40 mG 1 every 8 hours PRN  aluminum hydroxide/magnesium hydroxide/simethicone Suspension 30 every 4 hours PRN  buDESOnide    Inhalation Suspension 0.5 two times a day  buMETAnide 1 daily  influenza   Vaccine 0.5 once  ipratropium    for Nebulization 500 every 6 hours  montelukast 10 daily  NIFEdipine XL 30 <User Schedule>  pantoprazole    Tablet 40 before breakfast  predniSONE   Tablet 5 daily  sildenafil (REVATIO) 20 <User Schedule>  spironolactone 50 two times a day  SUMAtriptan 50 every 12 hours PRN  traZODone 25 at bedtime      Vital Signs Last 24 Hrs  T(C): 36.8 (24 Oct 2024 04:52), Max: 36.8 (24 Oct 2024 04:52)  T(F): 98.2 (24 Oct 2024 04:52), Max: 98.2 (24 Oct 2024 04:52)  HR: 98 (24 Oct 2024 04:52) (98 - 102)  BP: 100/68 (24 Oct 2024 04:52) (100/68 - 103/67)  BP(mean): --  RR: 18 (24 Oct 2024 04:52) (18 - 18)  SpO2: 98% (24 Oct 2024 04:52) (97% - 98%)    Parameters below as of 24 Oct 2024 04:52  Patient On (Oxygen Delivery Method): nasal cannula  O2 Flow (L/min): 2    PHYSICAL EXAMINATION:  General: Alert and Awake, NAD  HEENT: Normocephalic / Atraumatic  Resp: No accessory muscles of respiration utilized  Abdomen: Not distended.  MSK: No LE edema.   : No lucas  Skin: No rashes or lesions.    Neuro: Alert and Awake. CN 2-12 Grossly intact. Moves all four extremities spontaneously.  Psych: Calm, Pleasant, Cooperative                          13.0   11.20 )-----------( 331      ( 24 Oct 2024 09:45 )             42.4       10-24    136  |  98  |  18  ----------------------------<  137[H]  3.3[L]   |  22  |  1.07    Ca    8.5      24 Oct 2024 09:46  Phos  3.3     10-22  Mg     1.9     10-23    TPro  7.7  /  Alb  4.4  /  TBili  0.8  /  DBili  0.2  /  AST  13  /  ALT  15  /  AlkPhos  65  10-22      Urinalysis Basic - ( 24 Oct 2024 09:46 )    Color: x / Appearance: x / SG: x / pH: x  Gluc: 137 mg/dL / Ketone: x  / Bili: x / Urobili: x   Blood: x / Protein: x / Nitrite: x   Leuk Esterase: x / RBC: x / WBC x   Sq Epi: x / Non Sq Epi: x / Bacteria: x        MICROBIOLOGY:  v  .Blood BLOOD  10-14-24   No growth at 5 days  --  --      .Blood BLOOD  10-14-24   No growth at 5 days  --  --        HIV-1 RNA Quantitative, Viral Load Log: NOT DET. lg /mL (10-22-24 @ 17:39)  CMV IgG Antibody: >10.00 U/mL (10-22-24 @ 15:11)    Rapid RVP Result: NotDetec (10-23 @ 18:12)        RADIOLOGY:    <The imaging below has been reviewed and visualized by me independently. Findings as detailed in report below>    < from: CT Chest No Cont (10.23.24 @ 10:08) >  IMPRESSION:  Unchanged bilateral groundglass opacities, may be related to pulmonary   hypertension.    New pancreatic ductal dilatation. Recommend contrast-enhanced MRI/MRCP   for further evaluation.    < end of copied text >

## 2024-10-24 NOTE — PROGRESS NOTE ADULT - PROBLEM SELECTOR PLAN 4
Low grade fever 100.6 on 10/22. No new sx. UA neg. CT CAP without evidence of infection. Partial RVP neg. BCx NGTD.  - f/u bcx  - monitor off abx  - TXP ID following, f/u recs

## 2024-10-24 NOTE — CONSULT NOTE ADULT - PROVIDER SPECIALTY LIST ADULT
Dermatology
Cardiology
Dental
Dermatology
Electrophysiology
Gastroenterology
Transplant ID
Heart Failure
Pulmonology
Transplant Pulmonology

## 2024-10-24 NOTE — PROGRESS NOTE ADULT - PROBLEM SELECTOR PLAN 3
Imaging and laboratory testing for transplant eval ordered  - Lung TXP team following, f/u recs  - Dermatology, dental, psych consulted  - Will eventually need [facility]  - D/w ophthalmology, no need for inpatient evaluation as sx are unchanged from prior and has been seen by outpatient providers for this  - Will need to be discharged on ozempic to assist with weight loss - covered $0 copay  - cardiac MR for lung txp eval and MRCP for incidental PD dilatation, unable to obtain as patient is dependent on home pump for Remodulin. Per pulmonary - unable to pause and unable to transition off home pump to hospital pump. Will obtain MUGA scan in lieu of cardiac MRI. Advanced GI consulted for PD dilatation, plan for CT pancreas protocol to determine if patient requires EUS.

## 2024-10-24 NOTE — PROGRESS NOTE ADULT - PROBLEM SELECTOR PLAN 5
- History of GERD, on protonix 40mg at home.   - Epigastric tenderness on palpitation; likely related to chest pain with exertion/palpations  - Liver Enzymes and lipase WNL   - C/w protonix   - C/w maalox  - suspect atypical chest "stuck" sensation is more related to GERD rather than dysphagia, only happens when in hospital. PT counseled on GERD lifestyle measures, staying upright post meal.

## 2024-10-24 NOTE — PROVIDER CONTACT NOTE (CRITICAL VALUE NOTIFICATION) - ACTION/TREATMENT ORDERED:
per anticoagulation nomogram, pause heparin gtt for 1 hour, then resume @ 15 cc/hr. repeat aPTT 6 hours after. will continue to monitoring for s/s bleeding/hematoma.
Heparin nomogram followed as per protocol, and verified with Luana Pierre. Heparin gtt turned off for 1 hour, to be restarted at 16:30 @ 15cc/hr.
Heparin decreased to 11units/hr but then d/c due to patient going to cath lab as per orders. Will restart at a later time as per orders

## 2024-10-24 NOTE — PROGRESS NOTE ADULT - ASSESSMENT
44 yr old female with PMHx of PulmHTN class I/VI (dx 2014) on continuous Treprostinil (Remodulin) on home O2 (2L), c/b Rt heart failure s/p PPM (Dual chamber 2016), Chronic P.E. on DOAC (dx 2017), SVT s/p ablation (5/2020), Asthma (chronic steroid use - prednisone), JULIA who presented to the hospital with complaint of palpitations and dyspnea.    #pre lung transplant evaluation  - Consented for lung transplant eval 10/22/24 - defer Hep C donors for now  - Noted barrier to transplant at this time is BMI >34 however patient is motivated and agreeable to all modalities of weight loss including chemical agents as discussed with endocrine team  - patient aware to be candidate for txp she must reach weight of 190lb, her max weight was 300 lb when first met with our program in 2022  - Financial clearance obtained 10/22 [x]  - Plan to complete non invasive diagnostics first as discussed with primary team at bedside     - Cardiac MRI - unable to be done --> ?MUGA scan [ ]     - V/Q Scan [x]     - CT C/A/P [x]     - MRCP (pancreatic ductal dilation) [ ]     - echo w/ bubble study [ ]    - Will need dental and dermatology consults for clearance [ ]  - Plan for EGD/EUS w/ GI on Friday 10/25/24 - optimized from a pulmonary standpoint however, pt will require cardiac anesthesia due to hx of pulm HTN.     - Please hold AC, now on heparin gtt 44 yr old female with PMHx of PulmHTN class I/VI (dx 2014) on continuous Treprostinil (Remodulin) on home O2 (2L), c/b Rt heart failure s/p PPM (Dual chamber 2016), Chronic P.E. on DOAC (dx 2017), SVT s/p ablation (5/2020), Asthma (chronic steroid use - prednisone), JULIA who presented to the hospital with complaint of palpitations and dyspnea.    #pre lung transplant evaluation  - Consented for lung transplant eval 10/22/24 - defer Hep C donors for now  - Noted barrier to transplant at this time is BMI >34 however patient is motivated and agreeable to all modalities of weight loss including chemical agents as discussed with endocrine team  - patient aware to be candidate for txp she must reach weight of 190lb, her max weight was 300 lb when first met with our program in 2022  - Financial clearance obtained 10/22 [x]  - Plan to complete non invasive diagnostics first as discussed with primary team at bedside     - Cardiac MRI - unable to be done --> ?MUGA scan [ ] friday     - V/Q Scan [x]     - CT C/A/P [x]     - MRCP (pancreatic ductal dilation) [ ]     - echo w/ bubble study [ ]    - Will need dental and dermatology consults for clearance [ ]  - Plan for EGD/EUS w/ GI on Friday 10/25/24 - optimized from a pulmonary standpoint however, pt will require cardiac anesthesia due to hx of pulm HTN.     - Please hold AC, now on heparin gtt 44 yr old female with PMHx of PulmHTN class I/VI (dx 2014) on continuous Treprostinil (Remodulin) on home O2 (2L), c/b Rt heart failure s/p PPM (Dual chamber 2016), Chronic P.E. on DOAC (dx 2017), SVT s/p ablation (5/2020), Asthma (chronic steroid use - prednisone), JULIA who presented to the hospital with complaint of palpitations and dyspnea.    #pre lung transplant evaluation  - Consented for lung transplant eval 10/22/24 - defer Hep C donors for now  - Noted barrier to transplant at this time is BMI >34 however patient is motivated and agreeable to all modalities of weight loss including chemical agents as discussed with endocrine team  - patient aware to be candidate for txp she must reach weight of 190lb, her max weight was 300 lb when first met with our program in 2022  - Financial clearance obtained 10/22 [x]  - Plan to complete non invasive diagnostics first as discussed with primary team at bedside     - Cardiac MRI - unable to be done --> ?MUGA scan [ ] friday     - V/Q Scan [x]     - CT C/A/P [x]     - MRCP (pancreatic ductal dilation) [ ]     - echo w/ bubble study [ ]  - Will follow up eval check list [ ]    - Will need dental and dermatology consults for clearance [ ]  - Plan for EGD/EUS w/ GI on Friday 10/25/24 - optimized from a pulmonary standpoint however, pt will require cardiac anesthesia due to hx of pulm HTN.     - Please hold AC, now on heparin gtt

## 2024-10-24 NOTE — CONSULT NOTE ADULT - SUBJECTIVE AND OBJECTIVE BOX
Patient seen and evaluated at bedside    Chief Complaint: Worsening dyspnea on exertion.     HPI:  44 year old woman with a history of pHTN (group I and IV on Rimodulin and Xarelto, on home 2L O2), c/b R heart failure, history of RA thrombus s/p thrombectomy, history of DVT/PE (2016), multiple abdominal wall abscesses (s/p I&D), asthma, bradycardia s/p dual chamber PPM. Patient presents with 2 days gradual onset palpitations and SOB on exertion, associated with midsternal chest tightness that is non-radiating. ROS additionally positive for epigastric pain. She states she usually had all these symptoms in the past for pHTN and asthma exacerbation. She endorse some baseline cough and sputum production but states it is not worse than usual. She denies fever, chills, dysuria, URI symptoms.    At baseline, patient states her mobility and dyspnea on exertion varies day by day. On good days, she can ambulate 6 minutes, turn around, and walk another 6 minutes before needing to rest. On bad days, she can barely walk. Currently patient feels good and was able to ambulate around the unit with assistance today. Patient was advised to avoid stairs, but states she has 5 steps in front of her home, and can get up the 5 steps before becoming short of breath. Main symptoms that limit mobility are shortness of breath, chest tightness, and increased heart rate. Patient states her heart rate can get up to 130s after the 5 steps of stairs. Patient currently is at baseline oxygen requirement and denies orthopnea. Patient tolerated LHC and RHC on 10/18 w/o issues. Cardiology consulted for pre-EGD cardiac clearance as part of lung transplant evaluation.       PMHx:   Asthma    Pulmonary hypertension    Tachycardia    Anemia    Pulmonary hypertension    Pulmonary embolism    Asthma    Sinus tachycardia    Asthma with status asthmaticus, unspecified asthma severity    PE (pulmonary thromboembolism)    Keratoconus    Sinusitis    Corneal disorder    Right heart failure    CAD (coronary artery disease)    H/O pulmonary hypertension    Pulmonary embolism    Asthma    History of tachycardia    On supplemental oxygen by nasal cannula    Pacemaker    Skin mass        PSHx:   History of tubal ligation    No significant past surgical history    Pacemaker    H/O tubal ligation    History of pacemaker        Allergies:  adhesives (Rash)  vancomycin (Rash)  penicillin (Rash)      Current Cardiac Related Medications:   - Nifedipine 30mg qd   - Aldactone 50mg BID   - Bumex 1mg PO qd   - Prednisone 5mg qd   - Sildenafil 20mg qd   - Treprostinil 51ng/kg/min dosed at 92kg   - AC discontinued in anticipation of procedure     FAMILY HISTORY:  Family history of diabetes mellitus  in brother    Family history of diabetes mellitus in mother    FH: anemia        Social History:  Smoking History: Never smoker   Alcohol Use: Denies current alcohol use   Drug Use: Denies    REVIEW OF SYSTEMS:  All other review of systems is negative unless indicated above.    Physical Exam:  T(F): 98.2 (10-24), Max: 98.2 (10-24)  HR: 98 (10-24) (98 - 102)  BP: 100/68 (10-24) (100/68 - 127/85)  RR: 18 (10-24)  SpO2: 98% (10-24)  Appearance: No acute distress; well appearing  Eyes:  EOMI  HEENT: Normal oral mucosa  Cardiovascular: RRR, S1, S2, no murmurs, rubs, or gallops; no edema; no JVD  Respiratory: Clear to auscultation bilaterally though diminished breath sounds throughout, no crackles, no rhonchi, no rubs   Gastrointestinal: soft, non-tender, non-distended  Extremities: no lower extremity edema   Neurologic: Non-focal  Psychiatry: AAOx3, mood & affect appropriate    Cardiovascular Diagnostic Testing:    ECG: Sinus tach    Echo: Pending repeat TTE and bubble study today    CONCLUSIONS:      1. Technically difficult image quality.   2. Left ventricular cavity is small. The interventricular septum is flattened in systole and diastole consistent with right ventricular pressure and volume overload. Left ventricular systolic function is hyperdynamic with an ejection fraction visually estimated at > 70 %. There is poor visualization of the endocardial borders to determine the presence of wall motion abnormalities.   3. The right ventricle is not well visualized. enlarged right ventricular cavity size and reduced right ventricular systolic function.   4. Mild to moderate pulmonic regurgitation.   5. Pulmonary artery systolic pressure could not be estimated.   6. Compared to the transthoracic echocardiogram performed on 2024, the right ventricle was not as well visualized on this study. There is again echocardiographic evidence of right ventricular failure. Quantification of severity is limited.    Cath:  Diagnostic Conclusions:     Baseline 2L NC   Normal right atrial pressure (mRA 3mmHg with a V wave of 5mmHg)   Severe pre-capillary pulmonary hypertension (sPAP 74mmHg, dPAP 43mmHg,  mPAP 55mmHg)  PCWP = 9mmHg with a V wave of 11mmHg   LVEDP = 7mmHg   PAsat = 61.8% // AOsat = 96.6% (2L NC)   Xenia CO // CI = 3.86l/min // 1.88l/min/m2   Thermo CO // CI = 3.47l/min // 1.69l/min/m2   SBP = 108/63/79     Status post administration of 40ppm of nitric oxide    Moderate pulmonary hypertension (sPAP 56mmHg, dPAP 33mmHg, mPAP  43mmHg)  LVEDP = 6mmHg   PAsat = 72.6% // AOsat = 99.2% (2L NC)   Xenia CO // CI = 5.19l/min // 2.53l/min/m2   Thermo CO // CI = 3.60l/min // 1.75l/min/m2   SBP = 111/68/84     VQ Study:   IMPRESSION:  Quantitative lung ventilation/perfusion scan demonstrates:    Differential perfusion: Left lun; Right lun, largely unchanged   since     Differential ventilation: Left lun; Right lun    CXR:   IMPRESSION:  Clear lungs.  Stable enlarged bilateral pulmonary arteries.  No abandoned leads.  No radiopaque foreign body.    Labs: Personally reviewed                        13.0   11.20 )-----------( 331      ( 24 Oct 2024 09:45 )             42.4     10-24    136  |  98  |  18  ----------------------------<  137[H]  3.3[L]   |  22  |  1.07    Ca    8.5      24 Oct 2024 09:46  Phos  3.3     10-22  Mg     1.9     10-23    TPro  7.7  /  Alb  4.4  /  TBili  0.8  /  DBili  0.2  /  AST  13  /  ALT  15  /  AlkPhos  65  10-22    PT/INR - ( 22 Oct 2024 15:07 )   PT: 23.9 sec;   INR: 2.11 ratio         PTT - ( 24 Oct 2024 09:45 )  PTT:138.5 sec            Total Cholesterol: 160  LDL: --  HDL: 46  T      Thyroid Stimulating Hormone, Serum: 1.78 uIU/mL (10-22 @ 15:07)

## 2024-10-24 NOTE — PROGRESS NOTE ADULT - PROBLEM SELECTOR PLAN 12
History of PE. RA clot s/p aspiration 2022    Plan:  -C/w Xarelto History of PE. RA clot s/p aspiration 2022    Plan:  -Xarelto on hold for possible EUS, transitioned to full dose lovenox

## 2024-10-24 NOTE — CONSULT NOTE ADULT - ASSESSMENT
44F with PMH of severe pHTN (group I and IV on Rimodulin and Xarelto) c/b R heart failure, history of RA thrombus s/p thrombectomy, history of DVT/PE (2016), multiple abdominal wall abscesses (s/p I&D), asthma, and bradycardia s/p dual chamber PPM who is currently admitted for acute on chronic SOUZA in setting of severe pulmonary HTN. Patient is undergoing lung transplant evaluation and cardiology was consulted for pre-EGD cardiac clearance. Patient was previously seen by HF team. Patient has low METS at baseline, most likely due to her worsening severe pHTN. Given EGD is a low risk procedure, patient is optimized from a cardiac standpoint for EGD.     - Need cardiac anesthesia present for EGD   - Otherwise optimized from a cardiac standpoint for EGD  44F with PMH of severe pHTN (group I and IV on Rimodulin and Xarelto) c/b R heart failure, history of RA thrombus s/p thrombectomy, history of DVT/PE (2016), multiple abdominal wall abscesses (s/p I&D), asthma, and bradycardia s/p dual chamber PPM who is currently admitted for acute on chronic SOUZA in setting of severe pulmonary HTN. Patient is undergoing lung transplant evaluation and cardiology was consulted for pre-EGD cardiac clearance. Patient was previously seen by HF team. Patient has low METS at baseline, most likely due to her worsening severe pHTN. Given EGD is a low risk procedure, patient is optimized from a cardiac standpoint for EGD.     - Need cardiac anesthesia present for EGD   - Otherwise optimized from a cardiac standpoint for EGD    Tamra Shafer, PGY-1

## 2024-10-24 NOTE — CONSULT NOTE ADULT - CONSULT REASON
Dental clearance prior to lung transplant
Pre EGD Cardiac Clearance
PD dilation
palpitations
Pulmonary Hypertension
Sinus Tach, evaluate PPM setting
lung transplant eval
pre-Lung Transplant
skin exam, lung transplant eval
Acute on chronic hypoxemic resp failure/ Palpitations

## 2024-10-24 NOTE — PROGRESS NOTE ADULT - SUBJECTIVE AND OBJECTIVE BOX
Lung Transplant Progress Note  =====================================================  24 hour events: no overnight events    T(C): 36.8 (10-24-24 @ 04:52), Max: 36.8 (10-24-24 @ 04:52)  HR: 98 (10-24-24 @ 04:52) (94 - 102)  BP: 100/68 (10-24-24 @ 04:52) (100/68 - 127/85)  RR: 18 (10-24-24 @ 04:52) (18 - 18)  SpO2: 98% (10-24-24 @ 04:52) (97% - 98%)    PAST MEDICAL & SURGICAL HISTORY:  Tachycardia      Anemia      Pulmonary hypertension      Pulmonary embolism      Asthma  On home O2 nightly at 2L via NC      PE (pulmonary thromboembolism)  on Xarelto 10 mg daily      Sinusitis      Corneal disorder      Right heart failure      CAD (coronary artery disease)      H/O pulmonary hypertension      Pulmonary embolism      Asthma      History of tachycardia      On supplemental oxygen by nasal cannula  O2 @ 2L      Pacemaker      Skin mass  left upper thigh mass      History of tubal ligation      Pacemaker      H/O tubal ligation      History of pacemaker  12/19 - [employer] model Ingevity 4469    Home Meds: Home Medications:  Atrovent 18 mcg/inh inhalation aerosol: 1 inhaled 4 times a day as needed for  shortness of breath and/or wheezing (14 Oct 2024 05:41)  omeprazole 40 mg oral delayed release capsule: 1 cap(s) orally once a day (14 Oct 2024 05:42)  predniSONE 10 mg oral tablet: 1 tab(s) orally once a day (14 Oct 2024 05:42)  Remodulin 5 mg/mL injectable solution: 1 application injectable once a day patient is taking 48ng/kg/min via her own SQ PUMP (14 Oct 2024 05:42)  rivaroxaban 20 mg oral tablet: 1 tab(s) orally once a day (before a meal) (14 Oct 2024 05:42)  sildenafil 20 mg oral tablet: 1 tab(s) orally every 8 hours (14 Oct 2024 05:42)  spironolactone 25 mg oral tablet: 2 tab(s) orally once a day (14 Oct 2024 05:42)    Allergies: Allergies    adhesives (Rash)  vancomycin (Rash)  penicillin (Rash)    Intolerances    REVIEW OF SYSTEMS  [x] A ten-point review of systems was otherwise negative except as noted.  [ ] Due to altered mental status/intubation, subjective information were not able to be obtained from the patient. History was obtained, to the extent possible, from review of the chart and collateral sources of information.    CURRENT MEDICATIONS:   Neurologic Medications  acetaminophen     Tablet .. 650 milliGRAM(s) Oral every 6 hours PRN Temp greater or equal to 38C (100.4F), Mild Pain (1 - 3)  acetaminophen 325 mG/butalbital 50 mG/caffeine 40 mG 1 Tablet(s) Oral every 8 hours PRN Mild Pain (1 - 3)  SUMAtriptan 50 milliGRAM(s) Oral every 12 hours PRN Moderate to Severe Headache  traZODone 25 milliGRAM(s) Oral at bedtime    Respiratory Medications  buDESOnide    Inhalation Suspension 0.5 milliGRAM(s) Inhalation two times a day  ipratropium    for Nebulization 500 MICROGram(s) Nebulizer every 6 hours  montelukast 10 milliGRAM(s) Oral daily    Cardiovascular Medications  buMETAnide 1 milliGRAM(s) Oral daily  NIFEdipine XL 30 milliGRAM(s) Oral <User Schedule>  sildenafil (REVATIO) 20 milliGRAM(s) Oral <User Schedule>  spironolactone 50 milliGRAM(s) Oral two times a day    Gastrointestinal Medications  aluminum hydroxide/magnesium hydroxide/simethicone Suspension 30 milliLiter(s) Oral every 4 hours PRN Dyspepsia  pantoprazole    Tablet 40 milliGRAM(s) Oral before breakfast    Genitourinary Medications    Hematologic/Oncologic Medications  heparin   Injectable 4000 Unit(s) IV Push every 6 hours PRN For aPTT between 40 - 57  heparin   Injectable 8000 Unit(s) IV Push every 6 hours PRN For aPTT less than 40  heparin  Infusion.  Unit(s)/Hr IV Continuous <Continuous>  influenza   Vaccine 0.5 milliLiter(s) IntraMuscular once    Antimicrobial/Immunologic Medications    Endocrine/Metabolic Medications  predniSONE   Tablet 5 milliGRAM(s) Oral daily    Topical/Other Medications  hydrocortisone 1% Ointment 1 Application(s) Topical two times a day PRN Itching  mupirocin 2% Nasal 1 Application(s) Both Nostrils two times a day  Treprostinil SubQ Continous Infusion 51 NANOGram(s)/kG/Min 0.056 mL/Hr IV Continuous <Continuous>      INS/OUTS (last 24 hours):  I&O's Summary    22 Oct 2024 07:01  -  23 Oct 2024 07:00  --------------------------------------------------------  IN: 900 mL / OUT: 0 mL / NET: 900 mL    23 Oct 2024 07:01  -  24 Oct 2024 05:59  --------------------------------------------------------  IN: 990 mL / OUT: 500 mL / NET: 490 mL  --------------------------------------------------------------------------------------    PHYSICAL EXAM:  GENERAL: NAD  HEAD:  Atraumatic, normocephalic  EYES: EOMI, PERRLA, conjunctiva and sclera clear  NECK: Supple  CHEST/LUNG: nonlabored, good inspiratory effort, 2L O2 via NC  HEART: RRR. +S1/S2  ABDOMEN: Soft, nondistended  EXTREMITIES: No clubbing, cyanosis, or edema  PSYCH: A&O x3  NEUROLOGY: Non-focal, motor & sensory grossly intact  SKIN: Warm & dry, no rashes or lesions    LABS:                        13.3   13.49 )-----------( 306      ( 24 Oct 2024 00:48 )             43.2     10-23    134[L]  |  99  |  18  ----------------------------<  101[H]  4.1   |  22  |  1.07    Ca    8.9      23 Oct 2024 09:25  Phos  3.3     10-22  Mg     1.9     10-23    TPro  7.7  /  Alb  4.4  /  TBili  0.8  /  DBili  0.2  /  AST  13  /  ALT  15  /  AlkPhos  65  10-22    PT/INR - ( 22 Oct 2024 15:07 )   PT: 23.9 sec;   INR: 2.11 ratio         PTT - ( 24 Oct 2024 00:48 )  PTT:>200.0 sec  Urinalysis Basic - ( 23 Oct 2024 09:25 )    Color: x / Appearance: x / SG: x / pH: x  Gluc: 101 mg/dL / Ketone: x  / Bili: x / Urobili: x   Blood: x / Protein: x / Nitrite: x   Leuk Esterase: x / RBC: x / WBC x   Sq Epi: x / Non Sq Epi: x / Bacteria: x

## 2024-10-24 NOTE — CONSULT NOTE ADULT - REASON FOR ADMISSION
palpitation, worsening SOUZA and chest tightness

## 2024-10-24 NOTE — CHART NOTE - NSCHARTNOTEFT_GEN_A_CORE
NUTRITION FOLLOW UP NOTE    PATIENT SEEN FOR: lung transplant evaluation, consult for assessment/education    SOURCE: [x] Patient  [x] Current Medical Record  [] RN  [] Family/support person at bedside  [] Patient unavailable/inappropriate  [] Other:    CHART REVIEWED/EVENTS NOTED.  [] No changes to nutrition care plan to note  [x] Nutrition Status:  - Now under evaluation for lung transplant     TRANSPLANT EVALUATION:  - Diet recall: skips breakfast, chicken burger with a small amount of fries for lunch. Spaghetti, baked pork chops, cabbage or broccoli, rice for dinner. Snacks on chips, Wheat Thins, fruit, chocolate, cake. Drinks largely cranberry juice, peach tea, Ginger-adam, cherry Pepsi, and water. Denies EtOH use. Does not take any micronutrient supplements  - Pt states her  primarily food shops/cooks and would assist in event of transplant  - Gets take out meal for lunch almost daily  - No reported financial concerns related to food reported  - Pt with limited physical activity tolerance due to SOB    DIET ORDER:   Diet, Regular (10-16-24)    CURRENT DIET ORDER IS:  [] Appropriate:  [] Inadequate:  [x] Other: See recommendations below.     NUTRITION INTAKE/PROVISION:  [x] PO: Per flow sheets pt consuming >75% of meals. Pt reports fair appetite.   [] Enteral Nutrition:  [] Parenteral Nutrition:    ANTHROPOMETRICS:  Drug Dosing Weight  Height (cm): 165.1 (13 Oct 2024 22:18)  Weight (kg): 99.8 (13 Oct 2024 22:18)  BMI (kg/m2): 36.6 (13 Oct 2024 22:18) --> based on low weight of 96.5kg: BMI = 35.4kg/m2  BSA (m2): 2.06 (13 Oct 2024 22:18)    Weights:   Daily Weight in k.8 (10-24), Weight in k.5 (10-23), Weight in k.9 (10-22), Weight in k.2 (10-21), Weight in k.6 (10-19), Weight in k.3 (10-18)   - pt being diuresed. UBW per pt 224lbs/101.8kg. Pt does not weight herself with any regularity.     MEDICATIONS:  MEDICATIONS  (STANDING):  buDESOnide    Inhalation Suspension 0.5 milliGRAM(s) Inhalation two times a day  buMETAnide 1 milliGRAM(s) Oral daily  influenza   Vaccine 0.5 milliLiter(s) IntraMuscular once  ipratropium    for Nebulization 500 MICROGram(s) Nebulizer every 6 hours  montelukast 10 milliGRAM(s) Oral daily  mupirocin 2% Nasal 1 Application(s) Both Nostrils two times a day  NIFEdipine XL 30 milliGRAM(s) Oral <User Schedule>  pantoprazole    Tablet 40 milliGRAM(s) Oral before breakfast  potassium chloride    Tablet ER 40 milliEquivalent(s) Oral every 4 hours  predniSONE   Tablet 5 milliGRAM(s) Oral daily  sildenafil (REVATIO) 20 milliGRAM(s) Oral <User Schedule>  spironolactone 50 milliGRAM(s) Oral two times a day  traZODone 25 milliGRAM(s) Oral at bedtime  Treprostinil SubQ Continous Infusion 51 NANOGram(s)/kG/Min 0.056 mL/Hr (0.06 mL/Hr) IV Continuous <Continuous>    MEDICATIONS  (PRN):  acetaminophen     Tablet .. 650 milliGRAM(s) Oral every 6 hours PRN Temp greater or equal to 38C (100.4F), Mild Pain (1 - 3)  acetaminophen 325 mG/butalbital 50 mG/caffeine 40 mG 1 Tablet(s) Oral every 8 hours PRN Mild Pain (1 - 3)  aluminum hydroxide/magnesium hydroxide/simethicone Suspension 30 milliLiter(s) Oral every 4 hours PRN Dyspepsia  hydrocortisone 1% Ointment 1 Application(s) Topical two times a day PRN Itching  SUMAtriptan 50 milliGRAM(s) Oral every 12 hours PRN Moderate to Severe Headache    NUTRITIONALLY PERTINENT LABS:  10-24 Na136 mmol/L Glu 137 mg/dL[H] K+ 3.3 mmol/L[L] Cr  1.07 mg/dL BUN 18 mg/dL 10-22 Phos 3.3 mg/dL 10-22 Alb 4.4 g/dL 10-22 PAB 24 mg/dL 10-22 Chol 160 mg/dL LDL --    HDL 46 mg/dL[L] Trig 77 mg/dL10-22 ALT 15 U/L AST 13 U/L Alkaline Phosphatase 65 U/L  10-22-24 @ 15:11 a1c 5.5  10-21-24 @ 08:42 a1c 5.6    A1C with Estimated Average Glucose Result: 5.5 % (10-22-24 @ 15:11)  A1C with Estimated Average Glucose Result: 5.6 % (10-21-24 @ 08:42)    Finger Sticks: N/A     NUTRITIONALLY PERTINENT MEDICATIONS/LABS:  [x] Reviewed  [] Relevant notes on medications/labs:    EDEMA:  [x] Reviewed  [] Relevant notes:    GI/ I&O:  [x] Reviewed  [] Relevant notes:  [] Other:    SKIN:   [x] No pressure injuries documented, per nursing flowsheet  [] Pressure injury previously noted  [] Change in pressure injury documentation:  [] Other:    ESTIMATED NEEDS:  [x] No change:  [] Updated:  Energy: 1698-1981kcal/day (30-35 kcal/kg)  Protein: 74-85g/day (1.3-1.5 g/kg)  Fluid:   ml/day or [x] defer to team  Based on: IBW 56.6kg     NUTRITION DIAGNOSIS:  [x] Prior Dx: Increased Nutrient Needs  [x] New Dx: Food and Nutrition Related Knowledge Deficit related to limited exposure to nutrition related topics as evidenced by pt undergoing evaluation for lung transplant.  Goal: Pt able to teach back 2 points of nutrition education provided.     EDUCATION:  [x] Yes:   1. In setting of evaluation for Lung Transplant, RD reviewed food safety after solid organ transplant guidelines; including, storage/cooking temperatures, cross contamination, expiration dates, and avoiding buffets and avoid eating out. Discussed S&S of food borne illness.  Reviewed food and immunosuppressive drug interactions (prednisone, tacrolimus, and cellcept). Reviewed the importance of BG control while on prednisone. Reviewed importance of a low K+ diet and reviewed foods high in K+ to be mindful of. Reviewed importance of avoiding grapefruit, pomegranate, starfruit and sour oranges. Pt was receptive to information and was able to use teach back points to verbalize understanding. Pt accepted written materials on discussed topics.   2. As pt would require weight loss for transplant, RD provided strategies for weight management.   [] Not appropriate/warranted    NUTRITION CARE PLAN:  1. Diet: Recommend low sodium diet.   2. Reinforce nutrition education as needed - RD remains available.    IMPRESSION: At this time, no absolute nutritional contraindications proceeding lung transplant; BMI of 35.4kg/m2 and dietary habits PTA (high sodium foods, excess added sugar, eating meals out daily) present as concerns; pt provided with heart healthy, consistent carbohydrate nutrition therapy and strategies for weight management.     [] Achieved - Continue current nutrition intervention(s)  [] Current medical condition precludes nutrition intervention at this time.    MONITORING AND EVALUATION:   RD remains available upon request and will follow up per protocol.    Renee Lewis MS, RD, CDN, CNSC  Available on [facility] TEAMS

## 2024-10-24 NOTE — CONSULT NOTE ADULT - CONSULT REQUESTED BY NAME
Cardiology
Elvie Fraser
Medicine
Medicine
Elvie Ruiz
Sophia Naik
Elvie Fraser
Medical Team
Medicine
Internal Medicine

## 2024-10-24 NOTE — PROGRESS NOTE ADULT - PROBLEM SELECTOR PLAN 8
Chronic Bradycardia s/p PPM. Presenting with palpitations.  Thyroid studies wnl.  - S/p PPM interrogation on 10/14 - showed SVT  - EP consult noted, no plans to adjust VT detection rate, signed off   - Telemetry monitoring Yes

## 2024-10-25 LAB
ALBUMIN SERPL ELPH-MCNC: 4.5 G/DL — SIGNIFICANT CHANGE UP (ref 3.3–5)
ALDOLASE SERPL-CCNC: 16.9 U/L — HIGH (ref 3.3–10.3)
ALP SERPL-CCNC: 69 U/L — SIGNIFICANT CHANGE UP (ref 40–120)
ALT FLD-CCNC: 14 U/L — SIGNIFICANT CHANGE UP (ref 10–45)
ANION GAP SERPL CALC-SCNC: 17 MMOL/L — SIGNIFICANT CHANGE UP (ref 5–17)
APCR PPP: 2.66 RATIO — SIGNIFICANT CHANGE UP
AST SERPL-CCNC: 15 U/L — SIGNIFICANT CHANGE UP (ref 10–40)
B MIYAMOTOI GLPQ BLD QL NAA+NON-PROBE: NEGATIVE — SIGNIFICANT CHANGE UP
B19V IGG SER-ACNC: 4.38 INDEX — HIGH (ref 0–0.9)
B19V IGG+IGM SER-IMP: POSITIVE
B19V IGG+IGM SER-IMP: SIGNIFICANT CHANGE UP
B19V IGM FLD-ACNC: 0.27 INDEX — SIGNIFICANT CHANGE UP (ref 0–0.9)
B19V IGM SER-ACNC: NEGATIVE — SIGNIFICANT CHANGE UP
BASOPHILS # BLD AUTO: 0.1 K/UL — SIGNIFICANT CHANGE UP (ref 0–0.2)
BASOPHILS NFR BLD AUTO: 0.8 % — SIGNIFICANT CHANGE UP (ref 0–2)
BILIRUB SERPL-MCNC: 0.7 MG/DL — SIGNIFICANT CHANGE UP (ref 0.2–1.2)
BLD GP AB SCN SERPL QL: NEGATIVE — SIGNIFICANT CHANGE UP
BODY SURFACE AREA CALCULATION: 1.73 M2 — SIGNIFICANT CHANGE UP
BUN SERPL-MCNC: 21 MG/DL — SIGNIFICANT CHANGE UP (ref 7–23)
CALCIUM SERPL-MCNC: 9.4 MG/DL — SIGNIFICANT CHANGE UP (ref 8.4–10.5)
CHLORIDE SERPL-SCNC: 99 MMOL/L — SIGNIFICANT CHANGE UP (ref 96–108)
CO2 SERPL-SCNC: 19 MMOL/L — LOW (ref 22–31)
COLLECT DURATION TIME UR: 24 HR — SIGNIFICANT CHANGE UP
CREAT CL ?TM UR+SERPL-VRATE: 94 ML/MIN — SIGNIFICANT CHANGE UP (ref 75–115)
CREAT SERPL-MCNC: 1.17 MG/DL — SIGNIFICANT CHANGE UP (ref 0.5–1.3)
CRYPTOC AG FLD QL: NEGATIVE — SIGNIFICANT CHANGE UP
DNA PLOIDY SPEC FC-IMP: SIGNIFICANT CHANGE UP
EGFR: 59 ML/MIN/1.73M2 — LOW
EOSINOPHIL # BLD AUTO: 0.31 K/UL — SIGNIFICANT CHANGE UP (ref 0–0.5)
EOSINOPHIL NFR BLD AUTO: 2.6 % — SIGNIFICANT CHANGE UP (ref 0–6)
ERYTHROCYTE [SEDIMENTATION RATE] IN BLOOD: 47 MM/HR — HIGH (ref 0–15)
GAMMA INTERFERON BACKGROUND BLD IA-ACNC: 0.03 IU/ML — SIGNIFICANT CHANGE UP
GLUCOSE SERPL-MCNC: 89 MG/DL — SIGNIFICANT CHANGE UP (ref 70–99)
HCT VFR BLD CALC: 44.4 % — SIGNIFICANT CHANGE UP (ref 34.5–45)
HGB BLD-MCNC: 13.6 G/DL — SIGNIFICANT CHANGE UP (ref 11.5–15.5)
IMM GRANULOCYTES NFR BLD AUTO: 0.4 % — SIGNIFICANT CHANGE UP (ref 0–0.9)
LYMPHOCYTES # BLD AUTO: 27.5 % — SIGNIFICANT CHANGE UP (ref 13–44)
LYMPHOCYTES # BLD AUTO: 3.27 K/UL — SIGNIFICANT CHANGE UP (ref 1–3.3)
M TB IFN-G BLD-IMP: NEGATIVE — SIGNIFICANT CHANGE UP
M TB IFN-G CD4+ BCKGRND COR BLD-ACNC: 0 IU/ML — SIGNIFICANT CHANGE UP
M TB IFN-G CD4+CD8+ BCKGRND COR BLD-ACNC: 0 IU/ML — SIGNIFICANT CHANGE UP
MCHC RBC-ENTMCNC: 21.8 PG — LOW (ref 27–34)
MCHC RBC-ENTMCNC: 30.6 GM/DL — LOW (ref 32–36)
MCV RBC AUTO: 71.2 FL — LOW (ref 80–100)
MONOCYTES # BLD AUTO: 1.08 K/UL — HIGH (ref 0–0.9)
MONOCYTES NFR BLD AUTO: 9.1 % — SIGNIFICANT CHANGE UP (ref 2–14)
NEUTROPHILS # BLD AUTO: 7.08 K/UL — SIGNIFICANT CHANGE UP (ref 1.8–7.4)
NEUTROPHILS NFR BLD AUTO: 59.6 % — SIGNIFICANT CHANGE UP (ref 43–77)
NRBC # BLD: 0 /100 WBCS — SIGNIFICANT CHANGE UP (ref 0–0)
PLATELET # BLD AUTO: 329 K/UL — SIGNIFICANT CHANGE UP (ref 150–400)
POTASSIUM SERPL-MCNC: 4.2 MMOL/L — SIGNIFICANT CHANGE UP (ref 3.5–5.3)
POTASSIUM SERPL-SCNC: 4.2 MMOL/L — SIGNIFICANT CHANGE UP (ref 3.5–5.3)
PROT SERPL-MCNC: 8.1 G/DL — SIGNIFICANT CHANGE UP (ref 6–8.3)
PTR INTERPRETATION: SIGNIFICANT CHANGE UP
QUANT TB PLUS MITOGEN MINUS NIL: >10 IU/ML — SIGNIFICANT CHANGE UP
RBC # BLD: 6.24 M/UL — HIGH (ref 3.8–5.2)
RBC # FLD: 20.3 % — HIGH (ref 10.3–14.5)
RH IG SCN BLD-IMP: POSITIVE — SIGNIFICANT CHANGE UP
SODIUM SERPL-SCNC: 135 MMOL/L — SIGNIFICANT CHANGE UP (ref 135–145)
STRONGYLOIDES AB SER-ACNC: NEGATIVE — SIGNIFICANT CHANGE UP
TOTAL VOLUME - 24 HOUR: 1300 ML — SIGNIFICANT CHANGE UP
URINE CREATININE CALCULATION: 1.5 G/24 H — SIGNIFICANT CHANGE UP (ref 0.8–1.8)
WBC # BLD: 11.89 K/UL — HIGH (ref 3.8–10.5)
WBC # FLD AUTO: 11.89 K/UL — HIGH (ref 3.8–10.5)

## 2024-10-25 PROCEDURE — 99233 SBSQ HOSP IP/OBS HIGH 50: CPT

## 2024-10-25 PROCEDURE — 43259 EGD US EXAM DUODENUM/JEJUNUM: CPT

## 2024-10-25 PROCEDURE — 99232 SBSQ HOSP IP/OBS MODERATE 35: CPT

## 2024-10-25 PROCEDURE — 99222 1ST HOSP IP/OBS MODERATE 55: CPT

## 2024-10-25 PROCEDURE — 99231 SBSQ HOSP IP/OBS SF/LOW 25: CPT

## 2024-10-25 RX ORDER — TRAZODONE HYDROCHLORIDE 100 MG/1
50 TABLET ORAL AT BEDTIME
Refills: 0 | Status: DISCONTINUED | OUTPATIENT
Start: 2024-10-25 | End: 2024-10-28

## 2024-10-25 RX ORDER — FOLIC ACID 1 MG/1
1 TABLET ORAL DAILY
Refills: 0 | Status: DISCONTINUED | OUTPATIENT
Start: 2024-10-25 | End: 2024-10-30

## 2024-10-25 RX ORDER — SODIUM CHLORIDE 9 MG/ML
500 INJECTION, SOLUTION INTRAMUSCULAR; INTRAVENOUS; SUBCUTANEOUS
Refills: 0 | Status: DISCONTINUED | OUTPATIENT
Start: 2024-10-25 | End: 2024-10-30

## 2024-10-25 RX ORDER — HEPATITIS B VIRUS VACCINE,RECB 20 MCG/ML
20 VIAL (ML) INTRAMUSCULAR ONCE
Refills: 0 | Status: DISCONTINUED | OUTPATIENT
Start: 2024-10-26 | End: 2024-10-30

## 2024-10-25 RX ORDER — RIVAROXABAN 20 MG/1
20 TABLET, FILM COATED ORAL
Refills: 0 | Status: DISCONTINUED | OUTPATIENT
Start: 2024-10-25 | End: 2024-10-30

## 2024-10-25 RX ORDER — MELATONIN 5 MG
3 TABLET ORAL ONCE
Refills: 0 | Status: COMPLETED | OUTPATIENT
Start: 2024-10-25 | End: 2024-10-25

## 2024-10-25 RX ORDER — INFLUENZ VIR VAC TV P-SURF2003 15MCG/.5ML
0.5 SYRINGE (ML) INTRAMUSCULAR ONCE
Refills: 0 | Status: DISCONTINUED | OUTPATIENT
Start: 2024-10-25 | End: 2024-10-30

## 2024-10-25 RX ADMIN — IPRATROPIUM BROMIDE 500 MICROGRAM(S): 0.5 SOLUTION RESPIRATORY (INHALATION) at 06:37

## 2024-10-25 RX ADMIN — Medication 50 MILLIGRAM(S): at 17:13

## 2024-10-25 RX ADMIN — Medication 5 MILLIGRAM(S): at 06:36

## 2024-10-25 RX ADMIN — Medication 1 MILLIGRAM(S): at 06:36

## 2024-10-25 RX ADMIN — MUPIROCIN 1 APPLICATION(S): 20 OINTMENT TOPICAL at 17:12

## 2024-10-25 RX ADMIN — TRAZODONE HYDROCHLORIDE 50 MILLIGRAM(S): 100 TABLET ORAL at 22:16

## 2024-10-25 RX ADMIN — IPRATROPIUM BROMIDE 500 MICROGRAM(S): 0.5 SOLUTION RESPIRATORY (INHALATION) at 17:12

## 2024-10-25 RX ADMIN — MUPIROCIN 1 APPLICATION(S): 20 OINTMENT TOPICAL at 06:37

## 2024-10-25 RX ADMIN — FOLIC ACID 1 MILLIGRAM(S): 1 TABLET ORAL at 12:53

## 2024-10-25 RX ADMIN — IPRATROPIUM BROMIDE 500 MICROGRAM(S): 0.5 SOLUTION RESPIRATORY (INHALATION) at 12:52

## 2024-10-25 RX ADMIN — RIVAROXABAN 20 MILLIGRAM(S): 20 TABLET, FILM COATED ORAL at 17:15

## 2024-10-25 RX ADMIN — Medication 20 MILLIGRAM(S): at 22:19

## 2024-10-25 RX ADMIN — MONTELUKAST SODIUM 10 MILLIGRAM(S): 10 TABLET, FILM COATED ORAL at 12:53

## 2024-10-25 RX ADMIN — Medication 20 MILLIGRAM(S): at 13:28

## 2024-10-25 RX ADMIN — BUDESONIDE 0.5 MILLIGRAM(S): 3 CAPSULE ORAL at 17:13

## 2024-10-25 RX ADMIN — Medication 30 MILLIGRAM(S): at 12:53

## 2024-10-25 RX ADMIN — PANTOPRAZOLE SODIUM 40 MILLIGRAM(S): 40 TABLET, DELAYED RELEASE ORAL at 06:37

## 2024-10-25 RX ADMIN — Medication 50 MILLIGRAM(S): at 06:37

## 2024-10-25 RX ADMIN — Medication 3 MILLIGRAM(S): at 01:21

## 2024-10-25 RX ADMIN — Medication 1 LOZENGE: at 17:11

## 2024-10-25 RX ADMIN — BUDESONIDE 0.5 MILLIGRAM(S): 3 CAPSULE ORAL at 06:37

## 2024-10-25 RX ADMIN — Medication 20 MILLIGRAM(S): at 00:04

## 2024-10-25 NOTE — BH CONSULTATION LIAISON PROGRESS NOTE - NSBHASSESSMENTFT_PSY_ALL_CORE
44-yr-old female with no PPHx with PMHx of PulmHTN class I/VI since 2014 ( on continuous Treprostinil and home O2 (2L), Rt heart failure and bradycardia, s/p dual chamber PPM, RA thrombus s/p thrombectomy, chronic PE, abdominal wall abscesses now presenting with palpitations and dyspnea for whom Transplant psychiatry was consulted for psychosocial clearance evaluation for Lung transplant. Patient is able to rationally manipulate information about the transplant process, notably risks and some aftercare recommendations. Has limited to moderate understanding of her condition and it's proposed treatment. She does understand the prognosis with or without the transplant. Would continue to provide further education about the transplant process. No pertinent past psychiatric history but currently exhibiting signs of adjustment d/o in setting of decline in health/need for transplantation. Additionally reports initial insomnia from anxious ruminations.    10/25:Patient on exam denied any symptoms of low mood, noted difficulty sleeping on Trazodone 25mg, and is amenable to increasing the dose at this time. Noted improvement regarding transplant education, regarding the need for lifelong medication, risk of infection and rejection post transplant on exam.     Plan:  -SIPAT 14- Patient is deemed to a good candidate from psychosocial standpoint; score likely to improve with further education about transplant process and aftercare recommendations.  -Labs: UDS, PETH, Cotinine, B12/Folate, TSH w/ FT4  -Can consider increasing Trazodone to 50 mg PO HS for insomnia  -Holistic RN consult recommended  -Non-pharmacologic means of anxiety alleviation recommended including progressive muscle relaxation, mindfulness and/or meditation   -Transplant psychiatry will continue to follow and reassess

## 2024-10-25 NOTE — PROGRESS NOTE ADULT - ASSESSMENT
44 yr old female with PMHx of PulmHTN class I/VI (dx 2014) on continuous Treprostinil (Remodulin) on home O2 (2L), c/b Rt heart failure s/p PPM (Dual chamber 2016), Chronic P.E. on DOAC (dx 2017), SVT s/p ablation (5/2020), Asthma (chronic steroid use - prednisone), JULIA who presented to the hospital with complaint of palpitations and dyspnea.    #pre lung transplant evaluation  - Consented for lung transplant eval 10/22/24 - defer Hep C donors for now  - Noted barrier to transplant at this time is BMI >34 however patient is motivated and agreeable to all modalities of weight loss including chemical agents as discussed with endocrine team  - patient aware to be candidate for txp she must reach weight of 190lb, her max weight was 300 lb when first met with our program in 2022 - to be addressed at outpatient follow ups  - Financial clearance obtained 10/22 [x]  - Plan to complete non invasive diagnostics first as discussed with primary team at bedside     - Cardiac MRI - unable to be done --> ?MUGA scan performed, awaiting report [ ]     - V/Q Scan [x]     - CT C/A/P [x]     - echo w/ bubble study [x]    - Plan for EGD/EUS w/ GI on Friday 10/25/24 - f/u results [ ]  - Esophagram/fluoro of diaphragm planned for Monday  - Tentative d/c home mid next week

## 2024-10-25 NOTE — PRE PROCEDURE NOTE - PRE PROCEDURE EVALUATION
Attending Physician:  Dr Barone                           Procedure:  EGD/EUS, possible FNB      Indication for Procedure: pancreatic duct dilation   ________________________________________________________  PAST MEDICAL & SURGICAL HISTORY:  Tachycardia      Anemia      Pulmonary hypertension      Pulmonary embolism      Asthma  On home O2 nightly at 2L via NC      PE (pulmonary thromboembolism)  on Xarelto 10 mg daily      Sinusitis      Corneal disorder      Right heart failure      CAD (coronary artery disease)      H/O pulmonary hypertension      Pulmonary embolism      Asthma      History of tachycardia      On supplemental oxygen by nasal cannula  O2 @ 2L      Pacemaker      Skin mass  left upper thigh mass      History of tubal ligation      Pacemaker      H/O tubal ligation      History of pacemaker  12/19 - [employer] model Ingevity 4469        ALLERGIES:  adhesives (Rash)  vancomycin (Rash)  penicillin (Rash)    HOME MEDICATIONS:  Atrovent 18 mcg/inh inhalation aerosol: 1 inhaled 4 times a day as needed for  shortness of breath and/or wheezing  omeprazole 40 mg oral delayed release capsule: 1 cap(s) orally once a day  predniSONE 10 mg oral tablet: 1 tab(s) orally once a day  Remodulin 5 mg/mL injectable solution: 1 application injectable once a day patient is taking 48ng/kg/min via her own SQ PUMP  rivaroxaban 20 mg oral tablet: 1 tab(s) orally once a day (before a meal)  sildenafil 20 mg oral tablet: 1 tab(s) orally every 8 hours  spironolactone 25 mg oral tablet: 2 tab(s) orally once a day    AICD/PPM: [ ] yes   [x ] no    PERTINENT LAB DATA:                        13.6   11.89 )-----------( 329      ( 25 Oct 2024 07:18 )             44.4     10-25    135  |  99  |  21  ----------------------------<  89  4.2   |  19[L]  |  1.17    Ca    9.4      25 Oct 2024 07:14    TPro  8.1  /  Alb  4.5  /  TBili  0.7  /  DBili  x   /  AST  15  /  ALT  14  /  AlkPhos  69  10-25    PTT - ( 24 Oct 2024 09:45 )  PTT:138.5 sec            PHYSICAL EXAMINATION:    T(C): 36.7  HR: 89  BP: 108/68  RR: 18  SpO2: 96%    Constitutional: NAD    Neck:  No JVD  Respiratory: normal effort on 2L   Cardiovascular: RRR  Extremities: No peripheral edema  Neurological: A/O x 4, no focal deficits        COMMENTS:    The patient is a suitable candidate for the planned procedure unless box checked [ ]  No, explain:     Attending Physician:  Dr Barone                           Procedure:  EGD/EUS, possible FNB      Indication for Procedure: Painless asymptomatic pancreatic duct dilation.  Hospitalized for right heart CHF/  ________________________________________________________  PAST MEDICAL & SURGICAL HISTORY:  Tachycardia      Anemia      Pulmonary hypertension      Pulmonary embolism      Asthma  On home O2 nightly at 2L via NC      PE (pulmonary thromboembolism)  on Xarelto 10 mg daily      Sinusitis      Corneal disorder      Right heart failure      CAD (coronary artery disease)      H/O pulmonary hypertension      Pulmonary embolism      Asthma      History of tachycardia      On supplemental oxygen by nasal cannula  O2 @ 2L      Pacemaker      Skin mass  left upper thigh mass      History of tubal ligation      Pacemaker      H/O tubal ligation      History of pacemaker  12/19 - [employer] model Ingevity 4469        ALLERGIES:  adhesives (Rash)  vancomycin (Rash)  penicillin (Rash)    HOME MEDICATIONS:  Atrovent 18 mcg/inh inhalation aerosol: 1 inhaled 4 times a day as needed for  shortness of breath and/or wheezing  omeprazole 40 mg oral delayed release capsule: 1 cap(s) orally once a day  predniSONE 10 mg oral tablet: 1 tab(s) orally once a day  Remodulin 5 mg/mL injectable solution: 1 application injectable once a day patient is taking 48ng/kg/min via her own SQ PUMP  rivaroxaban 20 mg oral tablet: 1 tab(s) orally once a day (before a meal)  sildenafil 20 mg oral tablet: 1 tab(s) orally every 8 hours  spironolactone 25 mg oral tablet: 2 tab(s) orally once a day    AICD/PPM: [ ] yes   [x ] no    PERTINENT LAB DATA:                        13.6   11.89 )-----------( 329      ( 25 Oct 2024 07:18 )             44.4     10-25    135  |  99  |  21  ----------------------------<  89  4.2   |  19[L]  |  1.17    Ca    9.4      25 Oct 2024 07:14    TPro  8.1  /  Alb  4.5  /  TBili  0.7  /  DBili  x   /  AST  15  /  ALT  14  /  AlkPhos  69  10-25    PTT - ( 24 Oct 2024 09:45 )  PTT:138.5 sec            PHYSICAL EXAMINATION:    T(C): 36.7  HR: 89  BP: 108/68  RR: 18  SpO2: 96%    Constitutional: NAD    Neck:  No JVD  Respiratory: normal effort on 2L   Cardiovascular: RRR  Extremities: No peripheral edema  Neurological: A/O x 4, no focal deficits        COMMENTS:    The patient is a suitable candidate for the planned procedure unless box checked [ ]  No, explain:     Attending Physician:  Dr Barone                           Procedure:  EGD/EUS, possible FNB      Indication for Procedure: Painless asymptomatic pancreatic duct dilation.  Hospitalized for right heart CHF/  ________________________________________________________  PAST MEDICAL & SURGICAL HISTORY:  Tachycardia      Anemia      Pulmonary hypertension      Pulmonary embolism      Asthma  On home O2 nightly at 2L via NC      PE (pulmonary thromboembolism)  on Xarelto 10 mg daily      Sinusitis      Corneal disorder      Right heart failure      CAD (coronary artery disease)      H/O pulmonary hypertension      Pulmonary embolism      Asthma      History of tachycardia      On supplemental oxygen by nasal cannula  O2 @ 2L      Pacemaker      Skin mass  left upper thigh mass      History of tubal ligation      Pacemaker      H/O tubal ligation      History of pacemaker  12/19 - [employer] model Ingevity 4469        ALLERGIES:  adhesives (Rash)  vancomycin (Rash)  penicillin (Rash)    HOME MEDICATIONS:  Atrovent 18 mcg/inh inhalation aerosol: 1 inhaled 4 times a day as needed for  shortness of breath and/or wheezing  omeprazole 40 mg oral delayed release capsule: 1 cap(s) orally once a day  predniSONE 10 mg oral tablet: 1 tab(s) orally once a day  Remodulin 5 mg/mL injectable solution: 1 application injectable once a day patient is taking 48ng/kg/min via her own SQ PUMP  rivaroxaban 20 mg oral tablet: 1 tab(s) orally once a day (before a meal)  sildenafil 20 mg oral tablet: 1 tab(s) orally every 8 hours  spironolactone 25 mg oral tablet: 2 tab(s) orally once a day    AICD/PPM: [x] yes   [ ] no - PPM interrogation with a battery life of 8.5 years     PERTINENT LAB DATA:                        13.6   11.89 )-----------( 329      ( 25 Oct 2024 07:18 )             44.4     10-25    135  |  99  |  21  ----------------------------<  89  4.2   |  19[L]  |  1.17    Ca    9.4      25 Oct 2024 07:14    TPro  8.1  /  Alb  4.5  /  TBili  0.7  /  DBili  x   /  AST  15  /  ALT  14  /  AlkPhos  69  10-25    PTT - ( 24 Oct 2024 09:45 )  PTT:138.5 sec            PHYSICAL EXAMINATION:    T(C): 36.7  HR: 89  BP: 108/68  RR: 18  SpO2: 96%    Constitutional: NAD    Neck:  No JVD  Respiratory: normal effort on 2L   Cardiovascular: RRR  Extremities: No peripheral edema  Neurological: A/O x 4, no focal deficits        COMMENTS:    The patient is a suitable candidate for the planned procedure unless box checked [ ]  No, explain:

## 2024-10-25 NOTE — PROGRESS NOTE ADULT - SUBJECTIVE AND OBJECTIVE BOX
Interval Events: Patient was seen and examined at bedside.    No acute events. S/P EGD, tolerated well.     REVIEW OF SYSTEMS:  Constitutional: [ -] fevers [ -] chills [ ] weight loss [ ] weight gain  CV: [- ] chest pain [ -] orthopnea [ ] palpitations [ ] murmur  Resp: [- ] cough [ -] shortness of breath [ -] dyspnea [- ] wheezing [ -] sputum [- ] hemoptysis  [x ] All other systems negative  [ ] Unable to assess ROS because ________    OBJECTIVE:  ICU Vital Signs Last 24 Hrs  T(C): 36.9 (25 Oct 2024 12:53), Max: 36.9 (25 Oct 2024 12:53)  T(F): 98.4 (25 Oct 2024 12:53), Max: 98.4 (25 Oct 2024 12:53)  HR: 95 (25 Oct 2024 13:26) (89 - 115)  BP: 129/89 (25 Oct 2024 13:26) (101/61 - 137/100)  BP(mean): 89 (25 Oct 2024 10:03) (89 - 89)  ABP: --  ABP(mean): --  RR: 17 (25 Oct 2024 13:26) (15 - 22)  SpO2: 95% (25 Oct 2024 13:26) (95% - 99%)    O2 Parameters below as of 25 Oct 2024 13:26  Patient On (Oxygen Delivery Method): room air      10-24 @ 07:01  -  10-25 @ 07:00  --------------------------------------------------------  IN: 290 mL / OUT: 900 mL / NET: -610 mL    10-25 @ 07:01  -  10-25 @ 18:22  --------------------------------------------------------  IN: 220 mL / OUT: 0 mL / NET: 220 mL      CAPILLARY BLOOD GLUCOSE  PHYSICAL EXAM:    Constitutional: well-developed; well-groomed; well-nourished; no distress, Obese  Eyes: PERRL; EOMI  ENMT: Normal oropharnxy, no oral lesions, no erythema, no exudates  Neck:  Supple; no JVD; normal thyroid gland  Respiratory: airway patent; breath sounds equal; good air movement, no wheezing, no crackles, no rhonchi. no increase in WOB  Cardiovascular: regular rate and rhythm  no rub , no murmur, no gallops.   Gastrointestinal: soft; no distention, normal BS, no TTP, no organomegaly, no ascites.  Extremities: no clubbing; no cyanosis; no pedal edema,   Neurological: alert and oriented x 3; Skin: warm and dry; color normal: no rash: no ulcers  Psychiatric: Calm, no SI/HI        HOSPITAL MEDICATIONS:  MEDICATIONS  (STANDING):  buDESOnide    Inhalation Suspension 0.5 milliGRAM(s) Inhalation two times a day  buMETAnide 1 milliGRAM(s) Oral daily  folic acid 1 milliGRAM(s) Oral daily  influenza   Vaccine 0.5 milliLiter(s) IntraMuscular once  ipratropium    for Nebulization 500 MICROGram(s) Nebulizer every 6 hours  montelukast 10 milliGRAM(s) Oral daily  mupirocin 2% Nasal 1 Application(s) Both Nostrils two times a day  NIFEdipine XL 30 milliGRAM(s) Oral <User Schedule>  pantoprazole    Tablet 40 milliGRAM(s) Oral before breakfast  potassium chloride    Tablet ER 40 milliEquivalent(s) Oral every 4 hours  predniSONE   Tablet 5 milliGRAM(s) Oral daily  rivaroxaban 20 milliGRAM(s) Oral with dinner  sildenafil (REVATIO) 20 milliGRAM(s) Oral <User Schedule>  sodium chloride 0.9%. 500 milliLiter(s) (30 mL/Hr) IV Continuous <Continuous>  spironolactone 50 milliGRAM(s) Oral two times a day  traZODone 50 milliGRAM(s) Oral at bedtime  Treprostinil SubQ Continous Infusion 51 NANOGram(s)/kG/Min 0.056 mL/Hr (0.06 mL/Hr) IV Continuous <Continuous>    MEDICATIONS  (PRN):  acetaminophen     Tablet .. 650 milliGRAM(s) Oral every 6 hours PRN Temp greater or equal to 38C (100.4F), Mild Pain (1 - 3)  acetaminophen 325 mG/butalbital 50 mG/caffeine 40 mG 1 Tablet(s) Oral every 8 hours PRN Mild Pain (1 - 3)  aluminum hydroxide/magnesium hydroxide/simethicone Suspension 30 milliLiter(s) Oral every 4 hours PRN Dyspepsia  hydrocortisone 1% Ointment 1 Application(s) Topical two times a day PRN Itching  SUMAtriptan 50 milliGRAM(s) Oral every 12 hours PRN Moderate to Severe Headache      LABS:                        13.6   11.89 )-----------( 329      ( 25 Oct 2024 07:18 )             44.4     Hgb Trend: 13.6<--, 13.0<--, 13.3<--, 13.7<--, 13.5<--  10-25    135  |  99  |  21  ----------------------------<  89  4.2   |  19[L]  |  1.17    Ca    9.4      25 Oct 2024 07:14    TPro  8.1  /  Alb  4.5  /  TBili  0.7  /  DBili  x   /  AST  15  /  ALT  14  /  AlkPhos  69  10-25    Creatinine Trend: 1.17<--, 1.07<--, 1.07<--, 1.10<--, 1.12<--, 1.15<--  PTT - ( 24 Oct 2024 09:45 )  PTT:138.5 sec  Urinalysis Basic - ( 25 Oct 2024 07:14 )    Color: x / Appearance: x / SG: x / pH: x  Gluc: 89 mg/dL / Ketone: x  / Bili: x / Urobili: x   Blood: x / Protein: x / Nitrite: x   Leuk Esterase: x / RBC: x / WBC x   Sq Epi: x / Non Sq Epi: x / Bacteria: x          MICROBIOLOGY:     RADIOLOGY & EKG:  [x ] Reviewed and interpreted by me

## 2024-10-25 NOTE — PROGRESS NOTE ADULT - ASSESSMENT
[Weight management counseling provided] : Weight management [Healthy eating counseling provided] : healthy eating [Activity counseling provided] : activity 44 year old woman with a history of pHTN (group I and IV on Rimodulin and Xarelto, on home 2L O2), c/b R heart failure, history of RA thrombus s/p thrombectomy, history of DVT/PE (2016), multiple abdominal wall abscesses (s/p I&D), asthma, bradycardia s/p dual chamber PPM. Patient presents with 2 days gradual onset palpitations and SOB on exertion, associated with midsternal chest tightness that is non-radiating. ROS additionally positive for epigastric pain. She states she usually had all these symptoms in the past for pHTN and asthma exacerbation. She endorse some baseline cough and sputum production but states it is not worse than usual. She denies fever, chills, dysuria, URI symptoms.    RVP (10/14, 10/23) Negative  BCx (10/14) NGTD  UA (10/22) Negative Leukocyte Esterase  Urine Legionella Ag (10/22) Negative    MRSA/MSSA Nasal PCR (10/22) Positive MRSA    CT Chest (10/23) Unchanged bilateral groundglass opacities, may be related to pulmonary hypertension.    CT A/P (10/23) New pancreatic ductal dilatation. Recommend contrast-enhanced MRI/MRCP for further evaluation.    #Fever, Abnormal CT Chest  --Monitor off of antibiotics for now  --If further fevers would initiate Vancomycin + Cefepime  --Continue to follow CBC with diff  --Continue to follow transaminases  --Continue to follow temperature curve  --Follow up on preliminary blood cultures  --Follow up on serum fungitell    #Pre-Lung Transplant Evaluation  COVID19 Nucleocapsid Antibody Negative  COVID19 Rajinder Antibody Positive  HAV IgG Negative  HBVs Ab Negative  HBVsAg Negative  HBVc Ab Negative  HCV Ab Negative  HSV 1 IgG Positive  HSV 2 IgG Negative  EBV IgG Positive  CMV IgG Positive  VZV IgG Positive  Measles IgG Positive  Mumps IgG Positive  Rubella IgG Positive  Quantiferon Gold Negative  HIV Ag/Ab by CMIA Negative  Syphilis Screen Negative  Toxoplasma IgG PENDING  Strongyloides Ab Negative  Trypanosoma cruzi Ab Negative  --ID Clearance for lung transplant pending fungitell and prelim blood cultures    #Encounter to Vaccinate Patient  COVID19: Would benefit from COVID19 [id number] Vaccine Dose  Influenza: Will require (I will order for tomorrow)  RSV: Will require  Pneumococcal: States she had pneumococcal vaccination ~2022  HAV: Would benefit from Havrix  HBV: Would benefit from Heplisav (I will order for tomorrow)  MMR: Rubella IgG pending. Mumps and Measles immune  Varicella: Immune will not require further vaccination  Shingles: Will require Shingrix  Tdap: Will require Tdap    Dr. Margo Ash will be covering the patient starting from tomorrow. Please reach out to her for further questions and follow up.     Rogelio Gonsales M.D.  [facility] Division of Infectious Disease  8AM-5PM Monday - Friday: Available on [employer] Teams  After Hours and Holidays (or if no response on [employer] Teams): Please contact the Infectious Diseases Office at [phone]

## 2024-10-25 NOTE — BH CONSULTATION LIAISON PROGRESS NOTE - NSBHATTESTCOMMENTATTENDFT_PSY_A_CORE
44-yr-old female with no PPHx with PMHx of PulmHTN class I/VI since 2014 ( on continuous Treprostinil and home O2 (2L), Rt heart failure and bradycardia, s/p dual chamber PPM, RA thrombus s/p thrombectomy, chronic PE, abdominal wall abscesses now presenting with palpitations and dyspnea for whom Transplant psychiatry was consulted for psychosocial clearance evaluation for Lung transplant. Patient is able to rationally manipulate information about the transplant process, notably risks and some aftercare recommendations. Has limited to moderate understanding of her condition and it's proposed treatment. She does understand the prognosis with or without the transplant. Would continue to provide further education about the transplant process. No pertinent past psychiatric history but currently exhibiting signs of adjustment d/o in setting of decline in health/need for transplantation. Additionally reports initial insomnia from anxious ruminations. Patient on exam denied any symptoms of low mood, noted difficulty sleeping on Trazodone 25mg.  Agree with increasing the Trazodone and agree that pt show understanding regarding the transplant process and regarding the need for lifelong medication, risk of infection and rejection post transplant on exam.

## 2024-10-25 NOTE — BH CONSULTATION LIAISON PROGRESS NOTE - NSBHATTESTAPPAMEND_PSY_A_CORE
I have personally seen and examined this patient. I fully participated in the care of this patient. I have made amendments to the documentation where appropriate and otherwise agree with the history, physical exam, and plan as documented by the NAYELI

## 2024-10-25 NOTE — PROGRESS NOTE ADULT - ASSESSMENT
43 yo F w/ PMHx PHTN (group I and IV) currently on remodulin and xarelto, chronic hypoxemic respiratory failure on 2L home O2, RA thrombus s/p thrombectomy, Asthma (Pred 10mg), prior AVRNT s/p ablation, bradycardia s/p dual chamber PPM, and hx of abdominal wall abscesses presenting for increased dyspnea and palpitations with HR into 150s. CTPA negative for PE but showing bilateral GGOs. ED POCUS with IVC >2cm, resp variation noted, severe RV dilation. BNP elevated to 1800, prev 800 on recent admission.  No wheezing on exam.  Overall findings consistent with HF exacerbation. Echo with RV dilation, mildly reduced RV function. EP investigated PPM but no arrhythmias noted?  RHC/LHC findings noted, notably some NO response with significant improvement in CI/CO, mPAP 56 to 43, reduced PVR from 11.2 MONACO to 6.3 MONACO. RA 3 and PCWP 9 c/w euvolemia.    # Severe pHTN, group 1 and IV  # Obesity  # SVT  - Severe pHTN, group 1 diease. S/p RHC with some Marcela response.   - C/W Sildenafil, remodulin at current dose.   - C/w pulmicort, and atrovent. Not on KEE 2/2 to hx of SVT/tachycardia  - C/W full a/c   - on Bumex 1mg PO daily, aldactone  - supplemental O2 as needed for sat >93 to prevent hypoxemic vasoconstriction.   - C/W pred 5mg  - D/W lung transplant team. May need inpatient work up.   - Per endocrine can start wegovy. Would start once transplant work up/ procedures are completed. Current BMI 36.6  - C/W her CCB given improvement in her pulmonary pressure on Marcela. No longer having headaches   - Pulmonary will follow.

## 2024-10-25 NOTE — PROGRESS NOTE ADULT - SUBJECTIVE AND OBJECTIVE BOX
Follow Up:      Interval History:    REVIEW OF SYSTEMS  [  ] ROS unobtainable because:    [  ] All other systems negative except as noted below    Constitutional:  [ ] fever [ ] chills  [ ] weight loss  [ ] weakness  Skin:  [ ] rash [ ] phlebitis	  Eyes: [ ] icterus [ ] pain  [ ] discharge	  ENMT: [ ] sore throat  [ ] thrush [ ] ulcers [ ] exudates  Respiratory: [ ] dyspnea [ ] hemoptysis [ ] cough [ ] sputum	  Cardiovascular:  [ ] chest pain [ ] palpitations [ ] edema	  Gastrointestinal:  [ ] nausea [ ] vomiting [ ] diarrhea [ ] constipation [ ] pain	  Genitourinary:  [ ] dysuria [ ] frequency [ ] hematuria [ ] discharge [ ] flank pain  [ ] incontinence  Musculoskeletal:  [ ] myalgias [ ] arthralgias [ ] arthritis  [ ] back pain  Neurological:  [ ] headache [ ] seizures  [ ] confusion/altered mental status    Allergies  adhesives (Rash)  vancomycin (Rash)  penicillin (Rash)        ANTIMICROBIALS:      OTHER MEDS:  MEDICATIONS  (STANDING):  acetaminophen     Tablet .. 650 every 6 hours PRN  acetaminophen 325 mG/butalbital 50 mG/caffeine 40 mG 1 every 8 hours PRN  aluminum hydroxide/magnesium hydroxide/simethicone Suspension 30 every 4 hours PRN  buDESOnide    Inhalation Suspension 0.5 two times a day  buMETAnide 1 daily  influenza   Vaccine 0.5 once  ipratropium    for Nebulization 500 every 6 hours  montelukast 10 daily  NIFEdipine XL 30 <User Schedule>  pantoprazole    Tablet 40 before breakfast  predniSONE   Tablet 5 daily  rivaroxaban 20 with dinner  sildenafil (REVATIO) 20 <User Schedule>  spironolactone 50 two times a day  SUMAtriptan 50 every 12 hours PRN  traZODone 50 at bedtime      Vital Signs Last 24 Hrs  T(C): 36.9 (25 Oct 2024 12:53), Max: 36.9 (25 Oct 2024 12:53)  T(F): 98.4 (25 Oct 2024 12:53), Max: 98.4 (25 Oct 2024 12:53)  HR: 95 (25 Oct 2024 13:26) (89 - 115)  BP: 129/89 (25 Oct 2024 13:26) (101/61 - 137/100)  BP(mean): 89 (25 Oct 2024 10:03) (89 - 89)  RR: 17 (25 Oct 2024 13:26) (15 - 22)  SpO2: 95% (25 Oct 2024 13:26) (95% - 99%)    Parameters below as of 25 Oct 2024 13:26  Patient On (Oxygen Delivery Method): room air        PHYSICAL EXAMINATION:  General: Alert and Awake, NAD  HEENT: PERRL, EOMI  Neck: Supple  Cardiac: RRR, No M/R/G  Resp: CTAB, No Wh/Rh/Ra  Abdomen: NBS, NT/ND, No HSM, No rigidity or guarding  MSK: No LE edema. No Calf tenderness  : No lucas  Skin: No rashes or lesions. Skin is warm and dry to the touch.   Neuro: Alert and Awake. CN 2-12 Grossly intact. Moves all four extremities spontaneously.  Psych: Calm, Pleasant, Cooperative                          13.6   11.89 )-----------( 329      ( 25 Oct 2024 07:18 )             44.4       10-25    135  |  99  |  21  ----------------------------<  89  4.2   |  19[L]  |  1.17    Ca    9.4      25 Oct 2024 07:14    TPro  8.1  /  Alb  4.5  /  TBili  0.7  /  DBili  x   /  AST  15  /  ALT  14  /  AlkPhos  69  10-25      Urinalysis Basic - ( 25 Oct 2024 07:14 )    Color: x / Appearance: x / SG: x / pH: x  Gluc: 89 mg/dL / Ketone: x  / Bili: x / Urobili: x   Blood: x / Protein: x / Nitrite: x   Leuk Esterase: x / RBC: x / WBC x   Sq Epi: x / Non Sq Epi: x / Bacteria: x        MICROBIOLOGY:  v  .Blood BLOOD  10-24-24   No growth at 24 hours  --  --      .Blood BLOOD  10-23-24   No growth at 24 hours  --  --      .Blood BLOOD  10-14-24   No growth at 5 days  --  --      .Blood BLOOD  10-14-24   No growth at 5 days  --  --        HIV-1 RNA Quantitative, Viral Load Log: NOT DET. lg /mL (10-22-24 @ 17:39)  CMV IgG Antibody: >10.00 U/mL (10-22-24 @ 15:11)    Rapid RVP Result: NotDetec (10-23 @ 18:12)        RADIOLOGY:    <The imaging below has been reviewed and visualized by me independently. Findings as detailed in report below> Follow Up:  pre-Lung Transplant    Interval History: afebrile. no acute events.     REVIEW OF SYSTEMS  [  ] ROS unobtainable because:    [ x ] All other systems negative except as noted below    Constitutional:  [ ] fever [ ] chills  [ ] weight loss  [ ] weakness  Skin:  [ ] rash [ ] phlebitis	  Eyes: [ ] icterus [ ] pain  [ ] discharge	  ENMT: [ ] sore throat  [ ] thrush [ ] ulcers [ ] exudates  Respiratory: [ ] dyspnea [ ] hemoptysis [ ] cough [ ] sputum	  Cardiovascular:  [ ] chest pain [ ] palpitations [ ] edema	  Gastrointestinal:  [ ] nausea [ ] vomiting [ ] diarrhea [ ] constipation [ ] pain	  Genitourinary:  [ ] dysuria [ ] frequency [ ] hematuria [ ] discharge [ ] flank pain  [ ] incontinence  Musculoskeletal:  [ ] myalgias [ ] arthralgias [ ] arthritis  [ ] back pain  Neurological:  [ ] headache [ ] seizures  [ ] confusion/altered mental status    Allergies  adhesives (Rash)  vancomycin (Rash)  penicillin (Rash)        ANTIMICROBIALS:      OTHER MEDS:  MEDICATIONS  (STANDING):  acetaminophen     Tablet .. 650 every 6 hours PRN  acetaminophen 325 mG/butalbital 50 mG/caffeine 40 mG 1 every 8 hours PRN  aluminum hydroxide/magnesium hydroxide/simethicone Suspension 30 every 4 hours PRN  buDESOnide    Inhalation Suspension 0.5 two times a day  buMETAnide 1 daily  influenza   Vaccine 0.5 once  ipratropium    for Nebulization 500 every 6 hours  montelukast 10 daily  NIFEdipine XL 30 <User Schedule>  pantoprazole    Tablet 40 before breakfast  predniSONE   Tablet 5 daily  rivaroxaban 20 with dinner  sildenafil (REVATIO) 20 <User Schedule>  spironolactone 50 two times a day  SUMAtriptan 50 every 12 hours PRN  traZODone 50 at bedtime      Vital Signs Last 24 Hrs  T(C): 36.9 (25 Oct 2024 12:53), Max: 36.9 (25 Oct 2024 12:53)  T(F): 98.4 (25 Oct 2024 12:53), Max: 98.4 (25 Oct 2024 12:53)  HR: 95 (25 Oct 2024 13:26) (89 - 115)  BP: 129/89 (25 Oct 2024 13:26) (101/61 - 137/100)  BP(mean): 89 (25 Oct 2024 10:03) (89 - 89)  RR: 17 (25 Oct 2024 13:26) (15 - 22)  SpO2: 95% (25 Oct 2024 13:26) (95% - 99%)    Parameters below as of 25 Oct 2024 13:26  Patient On (Oxygen Delivery Method): room air    PHYSICAL EXAMINATION:  General: Alert and Awake, NAD  HEENT: Normocephalic / Atraumatic  Resp: No accessory muscles of respiration utilized  Abdomen: Not distended.  MSK: No LE edema.   : No lucas  Skin: No rashes or lesions.    Neuro: Alert and Awake. CN 2-12 Grossly intact. Moves all four extremities spontaneously.  Psych: Calm, Pleasant, Cooperative                          13.6   11.89 )-----------( 329      ( 25 Oct 2024 07:18 )             44.4       10-25    135  |  99  |  21  ----------------------------<  89  4.2   |  19[L]  |  1.17    Ca    9.4      25 Oct 2024 07:14    TPro  8.1  /  Alb  4.5  /  TBili  0.7  /  DBili  x   /  AST  15  /  ALT  14  /  AlkPhos  69  10-25      Urinalysis Basic - ( 25 Oct 2024 07:14 )    Color: x / Appearance: x / SG: x / pH: x  Gluc: 89 mg/dL / Ketone: x  / Bili: x / Urobili: x   Blood: x / Protein: x / Nitrite: x   Leuk Esterase: x / RBC: x / WBC x   Sq Epi: x / Non Sq Epi: x / Bacteria: x        MICROBIOLOGY:  v  .Blood BLOOD  10-24-24   No growth at 24 hours  --  --      .Blood BLOOD  10-23-24   No growth at 24 hours  --  --      .Blood BLOOD  10-14-24   No growth at 5 days  --  --      .Blood BLOOD  10-14-24   No growth at 5 days  --  --        HIV-1 RNA Quantitative, Viral Load Log: NOT DET. lg /mL (10-22-24 @ 17:39)  CMV IgG Antibody: >10.00 U/mL (10-22-24 @ 15:11)    Rapid RVP Result: NotDetec (10-23 @ 18:12)        RADIOLOGY:    <The imaging below has been reviewed and visualized by me independently. Findings as detailed in report below>    < from: CT Abdomen and Pelvis w/ IV Cont (10.24.24 @ 17:13) >  IMPRESSION:    Slightly dilated main pancreatic duct measuring 4 mm, similar to study of   9/9/2022. No cholelithiasis or biliary dilatation. No pancreatic mass   identified.    < end of copied text >

## 2024-10-25 NOTE — PROGRESS NOTE ADULT - SUBJECTIVE AND OBJECTIVE BOX
[facility] Division of Hospital Medicine  Elvie Fraser MD  Available via MS Teams    SUBJECTIVE / OVERNIGHT EVENTS:  No acute events overnight. Seen s/p EUS. Some scratchy throat. No other complaints.    MEDICATIONS  (STANDING):  buDESOnide    Inhalation Suspension 0.5 milliGRAM(s) Inhalation two times a day  buMETAnide 1 milliGRAM(s) Oral daily  folic acid 1 milliGRAM(s) Oral daily  influenza   Vaccine 0.5 milliLiter(s) IntraMuscular once  ipratropium    for Nebulization 500 MICROGram(s) Nebulizer every 6 hours  montelukast 10 milliGRAM(s) Oral daily  mupirocin 2% Nasal 1 Application(s) Both Nostrils two times a day  NIFEdipine XL 30 milliGRAM(s) Oral <User Schedule>  pantoprazole    Tablet 40 milliGRAM(s) Oral before breakfast  potassium chloride    Tablet ER 40 milliEquivalent(s) Oral every 4 hours  predniSONE   Tablet 5 milliGRAM(s) Oral daily  rivaroxaban 20 milliGRAM(s) Oral with dinner  sildenafil (REVATIO) 20 milliGRAM(s) Oral <User Schedule>  sodium chloride 0.9%. 500 milliLiter(s) (30 mL/Hr) IV Continuous <Continuous>  spironolactone 50 milliGRAM(s) Oral two times a day  traZODone 25 milliGRAM(s) Oral at bedtime  Treprostinil SubQ Continous Infusion 51 NANOGram(s)/kG/Min 0.056 mL/Hr (0.06 mL/Hr) IV Continuous <Continuous>    MEDICATIONS  (PRN):  acetaminophen     Tablet .. 650 milliGRAM(s) Oral every 6 hours PRN Temp greater or equal to 38C (100.4F), Mild Pain (1 - 3)  acetaminophen 325 mG/butalbital 50 mG/caffeine 40 mG 1 Tablet(s) Oral every 8 hours PRN Mild Pain (1 - 3)  aluminum hydroxide/magnesium hydroxide/simethicone Suspension 30 milliLiter(s) Oral every 4 hours PRN Dyspepsia  hydrocortisone 1% Ointment 1 Application(s) Topical two times a day PRN Itching  SUMAtriptan 50 milliGRAM(s) Oral every 12 hours PRN Moderate to Severe Headache      I&O's Summary    24 Oct 2024 07:01  -  25 Oct 2024 07:00  --------------------------------------------------------  IN: 290 mL / OUT: 900 mL / NET: -610 mL    25 Oct 2024 07:01  -  25 Oct 2024 16:20  --------------------------------------------------------  IN: 220 mL / OUT: 0 mL / NET: 220 mL        PHYSICAL EXAM:  Vital Signs Last 24 Hrs  T(C): 36.9 (25 Oct 2024 12:53), Max: 36.9 (25 Oct 2024 12:53)  T(F): 98.4 (25 Oct 2024 12:53), Max: 98.4 (25 Oct 2024 12:53)  HR: 95 (25 Oct 2024 13:26) (89 - 115)  BP: 129/89 (25 Oct 2024 13:26) (101/61 - 137/100)  BP(mean): 89 (25 Oct 2024 10:03) (89 - 89)  RR: 17 (25 Oct 2024 13:26) (15 - 22)  SpO2: 95% (25 Oct 2024 13:26) (95% - 99%)    Parameters below as of 25 Oct 2024 13:26  Patient On (Oxygen Delivery Method): room air      CONSTITUTIONAL: NAD  EYES: EOMI  ENMT: Moist oral mucosa  RESPIRATORY: Normal respiratory effort; scattered rhonchi  CARDIOVASCULAR: Regular rate and rhythm, no murmurs, no LE edema  ABDOMEN: Nontender to palpation, normoactive bowel sounds, no rebound/guarding  MUSCULOSKELETAL:  no joint swelling or tenderness to palpation  PSYCH: A+O to person, place, and time; affect appropriate  NEUROLOGY: strength and sensation grossly intact  SKIN: No rashes; no palpable lesions    LABS:                        13.6   11.89 )-----------( 329      ( 25 Oct 2024 07:18 )             44.4     10-25    135  |  99  |  21  ----------------------------<  89  4.2   |  19[L]  |  1.17    Ca    9.4      25 Oct 2024 07:14    TPro  8.1  /  Alb  4.5  /  TBili  0.7  /  DBili  x   /  AST  15  /  ALT  14  /  AlkPhos  69  10-25    PTT - ( 24 Oct 2024 09:45 )  PTT:138.5 sec      Urinalysis Basic - ( 25 Oct 2024 07:14 )    Color: x / Appearance: x / SG: x / pH: x  Gluc: 89 mg/dL / Ketone: x  / Bili: x / Urobili: x   Blood: x / Protein: x / Nitrite: x   Leuk Esterase: x / RBC: x / WBC x   Sq Epi: x / Non Sq Epi: x / Bacteria: x        Culture - Blood (collected 24 Oct 2024 09:09)  Source: .Blood BLOOD  Preliminary Report (25 Oct 2024 13:01):    No growth at 24 hours    Culture - Blood (collected 23 Oct 2024 14:29)  Source: .Blood BLOOD  Preliminary Report (24 Oct 2024 18:01):    No growth at 24 hours      SARS-CoV-2: NotDetec (23 Oct 2024 18:12)  SARS-CoV-2: NotDetec (14 Oct 2024 13:58)      RADIOLOGY & ADDITIONAL TESTS:  New Imaging Personally Reviewed Today:  New Electrocardiogram Personally Reviewed Today:  Other Results Reviewed Today:   Prior or Outpatient Records Reviewed Today with Summary:    COORDINATION OF CARE:  Consultant Communication and Details of Discussion (where applicable):

## 2024-10-25 NOTE — PROGRESS NOTE ADULT - SUBJECTIVE AND OBJECTIVE BOX
Lung Transplant Progress Note  =====================================================  24 hour events: No new complaints    T(C): 36.7 (10-25-24 @ 10:03), Max: 36.7 (10-25-24 @ 04:25)  HR: 89 (10-25-24 @ 10:03) (89 - 107)  BP: 108/68 (10-25-24 @ 10:03) (108/68 - 133/83)  RR: 18 (10-25-24 @ 10:03) (15 - 18)  SpO2: 96% (10-25-24 @ 10:03) (96% - 99%)    PAST MEDICAL & SURGICAL HISTORY:  Tachycardia      Anemia      Pulmonary hypertension      Pulmonary embolism      Asthma  On home O2 nightly at 2L via NC      PE (pulmonary thromboembolism)  on Xarelto 10 mg daily      Sinusitis      Corneal disorder      Right heart failure      CAD (coronary artery disease)      H/O pulmonary hypertension      Pulmonary embolism      Asthma      History of tachycardia      On supplemental oxygen by nasal cannula  O2 @ 2L      Pacemaker      Skin mass  left upper thigh mass      History of tubal ligation      Pacemaker      H/O tubal ligation      History of pacemaker  12/19 - Model [employer] model Ingevity 4469    Home Meds: Home Medications:  Atrovent 18 mcg/inh inhalation aerosol: 1 inhaled 4 times a day as needed for  shortness of breath and/or wheezing (14 Oct 2024 05:41)  omeprazole 40 mg oral delayed release capsule: 1 cap(s) orally once a day (14 Oct 2024 05:42)  predniSONE 10 mg oral tablet: 1 tab(s) orally once a day (14 Oct 2024 05:42)  Remodulin 5 mg/mL injectable solution: 1 application injectable once a day patient is taking 48ng/kg/min via her own SQ PUMP (14 Oct 2024 05:42)  rivaroxaban 20 mg oral tablet: 1 tab(s) orally once a day (before a meal) (14 Oct 2024 05:42)  sildenafil 20 mg oral tablet: 1 tab(s) orally every 8 hours (14 Oct 2024 05:42)  spironolactone 25 mg oral tablet: 2 tab(s) orally once a day (14 Oct 2024 05:42)    Allergies: Allergies    adhesives (Rash)  vancomycin (Rash)  penicillin (Rash)    Intolerances    REVIEW OF SYSTEMS  [x] A ten-point review of systems was otherwise negative except as noted.  [ ] Due to altered mental status/intubation, subjective information were not able to be obtained from the patient. History was obtained, to the extent possible, from review of the chart and collateral sources of information.    CURRENT MEDICATIONS:   Neurologic Medications  acetaminophen     Tablet .. 650 milliGRAM(s) Oral every 6 hours PRN Temp greater or equal to 38C (100.4F), Mild Pain (1 - 3)  acetaminophen 325 mG/butalbital 50 mG/caffeine 40 mG 1 Tablet(s) Oral every 8 hours PRN Mild Pain (1 - 3)  SUMAtriptan 50 milliGRAM(s) Oral every 12 hours PRN Moderate to Severe Headache  traZODone 25 milliGRAM(s) Oral at bedtime    Respiratory Medications  buDESOnide    Inhalation Suspension 0.5 milliGRAM(s) Inhalation two times a day  ipratropium    for Nebulization 500 MICROGram(s) Nebulizer every 6 hours  montelukast 10 milliGRAM(s) Oral daily    Cardiovascular Medications  buMETAnide 1 milliGRAM(s) Oral daily  NIFEdipine XL 30 milliGRAM(s) Oral <User Schedule>  sildenafil (REVATIO) 20 milliGRAM(s) Oral <User Schedule>  spironolactone 50 milliGRAM(s) Oral two times a day    Gastrointestinal Medications  aluminum hydroxide/magnesium hydroxide/simethicone Suspension 30 milliLiter(s) Oral every 4 hours PRN Dyspepsia  folic acid 1 milliGRAM(s) Oral daily  pantoprazole    Tablet 40 milliGRAM(s) Oral before breakfast  potassium chloride    Tablet ER 40 milliEquivalent(s) Oral every 4 hours  sodium chloride 0.9%. 500 milliLiter(s) IV Continuous <Continuous>    Genitourinary Medications    Hematologic/Oncologic Medications  influenza   Vaccine 0.5 milliLiter(s) IntraMuscular once    Antimicrobial/Immunologic Medications    Endocrine/Metabolic Medications  predniSONE   Tablet 5 milliGRAM(s) Oral daily    Topical/Other Medications  hydrocortisone 1% Ointment 1 Application(s) Topical two times a day PRN Itching  mupirocin 2% Nasal 1 Application(s) Both Nostrils two times a day  Treprostinil SubQ Continous Infusion 51 NANOGram(s)/kG/Min 0.056 mL/Hr IV Continuous <Continuous>      INS/OUTS (last 24 hours):  I&O's Summary    24 Oct 2024 07:01  -  25 Oct 2024 07:00  --------------------------------------------------------  IN: 290 mL / OUT: 900 mL / NET: -610 mL  --------------------------------------------------------------------------------------    PHYSICAL EXAM:  GENERAL: NAD, resting comfortably  HEAD:  Atraumatic, normocephalic  EYES: EOMI, PERRLA, conjunctiva and sclera clear  NECK: Supple  CHEST/LUNG: good inspiratory effort  HEART: RRR. +S1/S2  ABDOMEN: Soft, nondistended  EXTREMITIES: No clubbing, cyanosis, or edema  PSYCH: A&O x3  NEUROLOGY: Non-focal, motor & sensory grossly intact  SKIN: Warm & dry, no rashes or lesions    LABS:                        13.6   11.89 )-----------( 329      ( 25 Oct 2024 07:18 )             44.4     10-25    135  |  99  |  21  ----------------------------<  89  4.2   |  19[L]  |  1.17    Ca    9.4      25 Oct 2024 07:14    TPro  8.1  /  Alb  4.5  /  TBili  0.7  /  DBili  x   /  AST  15  /  ALT  14  /  AlkPhos  69  10-25    PTT - ( 24 Oct 2024 09:45 )  PTT:138.5 sec  Urinalysis Basic - ( 25 Oct 2024 07:14 )    Color: x / Appearance: x / SG: x / pH: x  Gluc: 89 mg/dL / Ketone: x  / Bili: x / Urobili: x   Blood: x / Protein: x / Nitrite: x   Leuk Esterase: x / RBC: x / WBC x   Sq Epi: x / Non Sq Epi: x / Bacteria: x        Culture - Blood (collected 23 Oct 2024 14:29)  Source: .Blood BLOOD  Preliminary Report (24 Oct 2024 18:01):    No growth at 24 hours      CAPILLARY BLOOD GLUCOSE          RADIOLOGY:

## 2024-10-25 NOTE — PROGRESS NOTE ADULT - PROBLEM SELECTOR PLAN 3
Imaging and laboratory testing for transplant eval ordered  - Lung TXP team following, f/u recs  - Dermatology, dental, psych consulted  - Will eventually need [facility]  - D/w ophthalmology, no need for inpatient evaluation as sx are unchanged from prior and has been seen by outpatient providers for this  - Will need to be discharged on ozempic to assist with weight loss - covered $0 copay  - cardiac MR for lung txp eval and MRCP for incidental PD dilatation, unable to obtain as patient is dependent on home pump for Remodulin. Per pulmonary - unable to pause and unable to transition off home pump to hospital pump. S/p MUGA and EUS/ERCP. WNL

## 2024-10-25 NOTE — PRE-OP CHECKLIST - ALLERGIES REVIEWED
done
done
Cephalexin Pregnancy And Lactation Text: This medication is Pregnancy Category B and considered safe during pregnancy.  It is also excreted in breast milk but can be used safely for shorter doses.

## 2024-10-25 NOTE — CHART NOTE - NSCHARTNOTEFT_GEN_A_CORE
Asked regarding colon cancer screening as part of transplant evaluation. In the absence of any FHx of colorectal cancer and JULIA, patient does not require colorectal cancer screening at her current age. Would recommend colonoscopy starting at age 45. Discussed with attending Dr. LANCE Barone.    Scooby Cummings MD  GI/Hepatology Fellow, PGY5  Long Range Pager [phone] or [facility] Pager [phone]  Teams preferred (7AM to 5PM); after 5PM, call GI fellow on call    On Weekends/Holidays (All Day) and Weekdays after 5PM to 8AM  For non-urgent consults, please email [email or web address] and [email or web address]  For urgent consults, please contact on call GI team. See Amion schedule ([facility]), Stamp[employer] paging system ([facility]), or call hospital  ([facility])

## 2024-10-26 LAB
FUNGITELL: <31 PG/ML — SIGNIFICANT CHANGE UP
HCT VFR BLD CALC: 47.6 % — HIGH (ref 34.5–45)
HGB BLD-MCNC: 14.6 G/DL — SIGNIFICANT CHANGE UP (ref 11.5–15.5)
MCHC RBC-ENTMCNC: 21.5 PG — LOW (ref 27–34)
MCHC RBC-ENTMCNC: 30.7 GM/DL — LOW (ref 32–36)
MCV RBC AUTO: 70 FL — LOW (ref 80–100)
NRBC # BLD: 0 /100 WBCS — SIGNIFICANT CHANGE UP (ref 0–0)
PLATELET # BLD AUTO: 325 K/UL — SIGNIFICANT CHANGE UP (ref 150–400)
RBC # BLD: 6.8 M/UL — HIGH (ref 3.8–5.2)
RBC # FLD: 20.6 % — HIGH (ref 10.3–14.5)
RUBV IGG SER-ACNC: 1.74 INDEX — SIGNIFICANT CHANGE UP
RUBV IGG SER-IMP: POSITIVE — SIGNIFICANT CHANGE UP
T GONDII IGG SER QL: <3 IU/ML — SIGNIFICANT CHANGE UP
T GONDII IGG SER QL: NEGATIVE — SIGNIFICANT CHANGE UP
WBC # BLD: 13.38 K/UL — HIGH (ref 3.8–10.5)
WBC # FLD AUTO: 13.38 K/UL — HIGH (ref 3.8–10.5)

## 2024-10-26 PROCEDURE — 99231 SBSQ HOSP IP/OBS SF/LOW 25: CPT

## 2024-10-26 PROCEDURE — 99232 SBSQ HOSP IP/OBS MODERATE 35: CPT

## 2024-10-26 RX ADMIN — BUDESONIDE 0.5 MILLIGRAM(S): 3 CAPSULE ORAL at 16:57

## 2024-10-26 RX ADMIN — Medication 20 MILLIGRAM(S): at 16:54

## 2024-10-26 RX ADMIN — Medication 5 MILLIGRAM(S): at 05:51

## 2024-10-26 RX ADMIN — Medication 1 MILLIGRAM(S): at 05:51

## 2024-10-26 RX ADMIN — PANTOPRAZOLE SODIUM 40 MILLIGRAM(S): 40 TABLET, DELAYED RELEASE ORAL at 07:12

## 2024-10-26 RX ADMIN — Medication 30 MILLIGRAM(S): at 14:04

## 2024-10-26 RX ADMIN — MONTELUKAST SODIUM 10 MILLIGRAM(S): 10 TABLET, FILM COATED ORAL at 14:05

## 2024-10-26 RX ADMIN — Medication 50 MILLIGRAM(S): at 05:51

## 2024-10-26 RX ADMIN — IPRATROPIUM BROMIDE 500 MICROGRAM(S): 0.5 SOLUTION RESPIRATORY (INHALATION) at 14:04

## 2024-10-26 RX ADMIN — MUPIROCIN 1 APPLICATION(S): 20 OINTMENT TOPICAL at 05:51

## 2024-10-26 RX ADMIN — IPRATROPIUM BROMIDE 500 MICROGRAM(S): 0.5 SOLUTION RESPIRATORY (INHALATION) at 00:10

## 2024-10-26 RX ADMIN — FOLIC ACID 1 MILLIGRAM(S): 1 TABLET ORAL at 14:05

## 2024-10-26 RX ADMIN — BUDESONIDE 0.5 MILLIGRAM(S): 3 CAPSULE ORAL at 05:52

## 2024-10-26 RX ADMIN — MUPIROCIN 1 APPLICATION(S): 20 OINTMENT TOPICAL at 17:00

## 2024-10-26 RX ADMIN — Medication 50 MILLIGRAM(S): at 16:58

## 2024-10-26 RX ADMIN — TRAZODONE HYDROCHLORIDE 50 MILLIGRAM(S): 100 TABLET ORAL at 21:45

## 2024-10-26 RX ADMIN — RIVAROXABAN 20 MILLIGRAM(S): 20 TABLET, FILM COATED ORAL at 16:57

## 2024-10-26 RX ADMIN — IPRATROPIUM BROMIDE 500 MICROGRAM(S): 0.5 SOLUTION RESPIRATORY (INHALATION) at 16:57

## 2024-10-26 RX ADMIN — IPRATROPIUM BROMIDE 500 MICROGRAM(S): 0.5 SOLUTION RESPIRATORY (INHALATION) at 05:52

## 2024-10-26 NOTE — PROGRESS NOTE ADULT - SUBJECTIVE AND OBJECTIVE BOX
[facility] Division of Hospital Medicine  Elvie Fraser MD  Available via MS Teams    SUBJECTIVE / OVERNIGHT EVENTS:  No acute events overnight. No new complaints.    MEDICATIONS  (STANDING):  buDESOnide    Inhalation Suspension 0.5 milliGRAM(s) Inhalation two times a day  buMETAnide 1 milliGRAM(s) Oral daily  folic acid 1 milliGRAM(s) Oral daily  hepatitis B Vaccine - Adult (HEPLISAV-B) 20 MICROGram(s) IntraMuscular once  influenza   Vaccine 0.5 milliLiter(s) IntraMuscular once  ipratropium    for Nebulization 500 MICROGram(s) Nebulizer every 6 hours  montelukast 10 milliGRAM(s) Oral daily  mupirocin 2% Nasal 1 Application(s) Both Nostrils two times a day  NIFEdipine XL 30 milliGRAM(s) Oral <User Schedule>  pantoprazole    Tablet 40 milliGRAM(s) Oral before breakfast  potassium chloride    Tablet ER 40 milliEquivalent(s) Oral every 4 hours  predniSONE   Tablet 5 milliGRAM(s) Oral daily  rivaroxaban 20 milliGRAM(s) Oral with dinner  sildenafil (REVATIO) 20 milliGRAM(s) Oral <User Schedule>  sodium chloride 0.9%. 500 milliLiter(s) (30 mL/Hr) IV Continuous <Continuous>  spironolactone 50 milliGRAM(s) Oral two times a day  traZODone 50 milliGRAM(s) Oral at bedtime  Treprostinil SubQ Continous Infusion 51 NANOGram(s)/kG/Min 0.056 mL/Hr (0.06 mL/Hr) IV Continuous <Continuous>    MEDICATIONS  (PRN):  acetaminophen     Tablet .. 650 milliGRAM(s) Oral every 6 hours PRN Temp greater or equal to 38C (100.4F), Mild Pain (1 - 3)  acetaminophen 325 mG/butalbital 50 mG/caffeine 40 mG 1 Tablet(s) Oral every 8 hours PRN Mild Pain (1 - 3)  aluminum hydroxide/magnesium hydroxide/simethicone Suspension 30 milliLiter(s) Oral every 4 hours PRN Dyspepsia  hydrocortisone 1% Ointment 1 Application(s) Topical two times a day PRN Itching  SUMAtriptan 50 milliGRAM(s) Oral every 12 hours PRN Moderate to Severe Headache      I&O's Summary    25 Oct 2024 07:01  -  26 Oct 2024 07:00  --------------------------------------------------------  IN: 220 mL / OUT: 0 mL / NET: 220 mL        PHYSICAL EXAM:  Vital Signs Last 24 Hrs  T(C): 36.9 (26 Oct 2024 04:51), Max: 36.9 (25 Oct 2024 12:53)  T(F): 98.5 (26 Oct 2024 04:51), Max: 98.5 (26 Oct 2024 04:51)  HR: 94 (26 Oct 2024 04:51) (94 - 102)  BP: 103/65 (26 Oct 2024 04:51) (103/65 - 137/100)  BP(mean): --  RR: 18 (26 Oct 2024 04:51) (17 - 18)  SpO2: 94% (26 Oct 2024 04:51) (93% - 95%)    Parameters below as of 26 Oct 2024 04:51  Patient On (Oxygen Delivery Method): room air    CONSTITUTIONAL: NAD  EYES: EOMI  ENMT: Moist oral mucosa  RESPIRATORY: Normal respiratory effort; scattered rhonchi  CARDIOVASCULAR: Regular rate and rhythm, no murmurs, no LE edema  ABDOMEN: Nontender to palpation, normoactive bowel sounds, no rebound/guarding  MUSCULOSKELETAL:  no joint swelling or tenderness to palpation  PSYCH: A+O to person, place, and time; affect appropriate  NEUROLOGY: strength and sensation grossly intact  SKIN: No rashes; no palpable lesions    LABS:                        14.6   13.38 )-----------( 325      ( 26 Oct 2024 09:23 )             47.6     10-25    135  |  99  |  21  ----------------------------<  89  4.2   |  19[L]  |  1.17    Ca    9.4      25 Oct 2024 07:14    TPro  8.1  /  Alb  4.5  /  TBili  0.7  /  DBili  x   /  AST  15  /  ALT  14  /  AlkPhos  69  10-25          Urinalysis Basic - ( 25 Oct 2024 07:14 )    Color: x / Appearance: x / SG: x / pH: x  Gluc: 89 mg/dL / Ketone: x  / Bili: x / Urobili: x   Blood: x / Protein: x / Nitrite: x   Leuk Esterase: x / RBC: x / WBC x   Sq Epi: x / Non Sq Epi: x / Bacteria: x        Culture - Blood (collected 24 Oct 2024 09:09)  Source: .Blood BLOOD  Preliminary Report (25 Oct 2024 13:01):    No growth at 24 hours    Culture - Blood (collected 23 Oct 2024 14:29)  Source: .Blood BLOOD  Preliminary Report (25 Oct 2024 18:01):    No growth at 48 Hours      SARS-CoV-2: NotDetec (23 Oct 2024 18:12)  SARS-CoV-2: NotDetec (14 Oct 2024 13:58)      RADIOLOGY & ADDITIONAL TESTS:  New Imaging Personally Reviewed Today:  New Electrocardiogram Personally Reviewed Today:  Other Results Reviewed Today:   Prior or Outpatient Records Reviewed Today with Summary:    COORDINATION OF CARE:  Consultant Communication and Details of Discussion (where applicable):

## 2024-10-26 NOTE — PROGRESS NOTE ADULT - PROBLEM SELECTOR PLAN 3
Imaging and laboratory testing for transplant eval ordered  - Lung TXP team following, f/u recs  - Dermatology, dental, psych consulted  - Will eventually need [facility]  - D/w ophthalmology, no need for inpatient evaluation as sx are unchanged from prior and has been seen by outpatient providers for this  - Will need to be discharged on ozempic to assist with weight loss - covered $0 copay  - cardiac MR for lung txp eval and MRCP for incidental PD dilatation, unable to obtain as patient is dependent on home pump for Remodulin. Per pulmonary - unable to pause and unable to transition off home pump to hospital pump. S/p MUGA and EUS/ERCP. WNL  - pending Esophagram/fluoro of diaphragm on Monday

## 2024-10-26 NOTE — PROGRESS NOTE ADULT - ASSESSMENT
43 yo F w/ PMHx PHTN (group I and IV) currently on remodulin and xarelto, chronic hypoxemic respiratory failure on 2L home O2, RA thrombus s/p thrombectomy, Asthma (Pred 10mg), prior AVRNT s/p ablation, bradycardia s/p dual chamber PPM, and hx of abdominal wall abscesses presenting for increased dyspnea and palpitations with HR into 150s. CTPA negative for PE but showing bilateral GGOs. ED POCUS with IVC >2cm, resp variation noted, severe RV dilation. BNP elevated to 1800, prev 800 on recent admission.  No wheezing on exam.  Overall findings consistent with HF exacerbation. Echo with RV dilation, mildly reduced RV function. EP investigated PPM but no arrhythmias noted?  RHC/LHC findings noted, notably some NO response with significant improvement in CI/CO, mPAP 56 to 43, reduced PVR from 11.2 MONACO to 6.3 MONACO. RA 3 and PCWP 9 c/w euvolemia.    # Severe pHTN, group 1 and IV  # Obesity  # SVT  - Severe pHTN, group 1 diease. S/p RHC with some Marcela response.   - C/W Sildenafil, remodulin at current dose.   - C/w pulmicort, and atrovent. Not on KEE 2/2 to hx of SVT/tachycardia  - C/W full a/c   - on Bumex 1mg PO daily, aldactone  - supplemental O2 as needed for sat >93 to prevent hypoxemic vasoconstriction.   - C/W pred 5mg  - D/W lung transplant team- initiating inpatient work up  - Per endocrine can start wegovy. Would start once transplant work up/ procedures are completed. Current BMI 36.6  - C/W her CCB given improvement in her pulmonary pressure on Marcela. No longer having headaches   - Pulmonary will follow.

## 2024-10-26 NOTE — PROGRESS NOTE ADULT - SUBJECTIVE AND OBJECTIVE BOX
CHIEF COMPLAINT: sob    Interval events: Patient seen and examined at bedside. No acute events overnight. Reports she feels well today. Able to ambulate to bathroom. Tolerating remodulin.    REVIEW OF SYSTEMS:  Constitutional: [X ] negative [ ] fevers [ ] chills [ ] weight loss [ ] weight gain  HEENT: [ X] negative [ ] dry eyes [ ] eye irritation [ ] postnasal drip [ ] nasal congestion  CV: [ ]X negative  [ ] chest pain [ ] orthopnea [ ] palpitations [ ] murmur  Resp: [X ] negative [ ] cough [ ] shortness of breath [ ] dyspnea [ ] wheezing [ ] sputum [ ] hemoptysis  GI: [X ] negative [ ] nausea [ ] vomiting [ ] diarrhea [ ] constipation [ ] abd pain [ ] dysphagia   : [X ] negative [ ] dysuria [ ] nocturia [ ] hematuria [ ] increased urinary frequency  Musculoskeletal: [ ] negative [ ] back pain [ ] myalgias [ ] arthralgias [ ] fracture  Skin: [ ] negative [ ] rash [ ] itch  Neurological: [ ] negative [ ] headache [ ] dizziness [ ] syncope [ ] weakness [ ] numbness  Psychiatric: [ ] negative [ ] anxiety [ ] depression  Endocrine: [ ] negative [ ] diabetes [ ] thyroid problem  Hematologic/Lymphatic: [ ] negative [ ] anemia [ ] bleeding problem  Allergic/Immunologic: [ ] negative [ ] itchy eyes [ ] nasal discharge [ ] hives [ ] angioedema  [ ] All other systems negative  [ ] Unable to assess ROS because ________    OBJECTIVE:  ICU Vital Signs Last 24 Hrs  T(C): 37 (26 Oct 2024 12:41), Max: 37 (26 Oct 2024 12:41)  T(F): 98.6 (26 Oct 2024 12:41), Max: 98.6 (26 Oct 2024 12:41)  HR: 106 (26 Oct 2024 14:07) (94 - 106)  BP: 117/84 (26 Oct 2024 14:07) (97/65 - 117/84)  BP(mean): 95 (26 Oct 2024 14:07) (76 - 95)  ABP: --  ABP(mean): --  RR: 18 (26 Oct 2024 12:41) (18 - 18)  SpO2: 94% (26 Oct 2024 12:41) (93% - 94%)    O2 Parameters below as of 26 Oct 2024 12:41  Patient On (Oxygen Delivery Method): room air              10-25 @ 07:01  -  10-26 @ 07:00  --------------------------------------------------------  IN: 220 mL / OUT: 0 mL / NET: 220 mL      CAPILLARY BLOOD GLUCOSE          PHYSICAL EXAM:  General: well appearing, NAD  HEENT: no icterus  Neck: supple  Respiratory: CTABL  Cardiovascular: S1/S2, RRR  Abdomen: soft, nontender  Extremities: no LE edema  Skin: no rashes  Neurological: AxO  Psychiatry: normal mood and affec      HOSPITAL MEDICATIONS:  rivaroxaban 20 milliGRAM(s) Oral with dinner      buMETAnide 1 milliGRAM(s) Oral daily  NIFEdipine XL 30 milliGRAM(s) Oral <User Schedule>  sildenafil (REVATIO) 20 milliGRAM(s) Oral <User Schedule>  spironolactone 50 milliGRAM(s) Oral two times a day    predniSONE   Tablet 5 milliGRAM(s) Oral daily    buDESOnide    Inhalation Suspension 0.5 milliGRAM(s) Inhalation two times a day  ipratropium    for Nebulization 500 MICROGram(s) Nebulizer every 6 hours  montelukast 10 milliGRAM(s) Oral daily    acetaminophen     Tablet .. 650 milliGRAM(s) Oral every 6 hours PRN  acetaminophen 325 mG/butalbital 50 mG/caffeine 40 mG 1 Tablet(s) Oral every 8 hours PRN  SUMAtriptan 50 milliGRAM(s) Oral every 12 hours PRN  traZODone 50 milliGRAM(s) Oral at bedtime    aluminum hydroxide/magnesium hydroxide/simethicone Suspension 30 milliLiter(s) Oral every 4 hours PRN  pantoprazole    Tablet 40 milliGRAM(s) Oral before breakfast        folic acid 1 milliGRAM(s) Oral daily  potassium chloride    Tablet ER 40 milliEquivalent(s) Oral every 4 hours  sodium chloride 0.9%. 500 milliLiter(s) IV Continuous <Continuous>    hepatitis B Vaccine - Adult (HEPLISAV-B) 20 MICROGram(s) IntraMuscular once  influenza   Vaccine 0.5 milliLiter(s) IntraMuscular once    hydrocortisone 1% Ointment 1 Application(s) Topical two times a day PRN  mupirocin 2% Nasal 1 Application(s) Both Nostrils two times a day    Treprostinil SubQ Continous Infusion 51 NANOGram(s)/kG/Min 0.056 mL/Hr IV Continuous <Continuous>      LABS:                        14.6   13.38 )-----------( 325      ( 26 Oct 2024 09:23 )             47.6     Hgb Trend: 14.6<--, 13.6<--, 13.0<--, 13.3<--, 13.7<--  10-25    135  |  99  |  21  ----------------------------<  89  4.2   |  19[L]  |  1.17    Ca    9.4      25 Oct 2024 07:14    TPro  8.1  /  Alb  4.5  /  TBili  0.7  /  DBili  x   /  AST  15  /  ALT  14  /  AlkPhos  69  10-25    Creatinine Trend: 1.17<--, 1.07<--, 1.07<--, 1.10<--, 1.12<--, 1.15<--    Urinalysis Basic - ( 25 Oct 2024 07:14 )    Color: x / Appearance: x / SG: x / pH: x  Gluc: 89 mg/dL / Ketone: x  / Bili: x / Urobili: x   Blood: x / Protein: x / Nitrite: x   Leuk Esterase: x / RBC: x / WBC x   Sq Epi: x / Non Sq Epi: x / Bacteria: x            MICROBIOLOGY:     RADIOLOGY:  [ ] Reviewed and interpreted by me    PULMONARY FUNCTION TESTS:    EKG:

## 2024-10-27 LAB
ANION GAP SERPL CALC-SCNC: 16 MMOL/L — SIGNIFICANT CHANGE UP (ref 5–17)
BUN SERPL-MCNC: 17 MG/DL — SIGNIFICANT CHANGE UP (ref 7–23)
CALCIUM SERPL-MCNC: 8.7 MG/DL — SIGNIFICANT CHANGE UP (ref 8.4–10.5)
CHLORIDE SERPL-SCNC: 97 MMOL/L — SIGNIFICANT CHANGE UP (ref 96–108)
CO2 SERPL-SCNC: 20 MMOL/L — LOW (ref 22–31)
CREAT SERPL-MCNC: 1.13 MG/DL — SIGNIFICANT CHANGE UP (ref 0.5–1.3)
EGFR: 62 ML/MIN/1.73M2 — SIGNIFICANT CHANGE UP
GLUCOSE SERPL-MCNC: 154 MG/DL — HIGH (ref 70–99)
H CAPSUL AG SPEC-ACNC: SIGNIFICANT CHANGE UP
H CAPSUL AG UR QL IA: SIGNIFICANT CHANGE UP
HCT VFR BLD CALC: 43.8 % — SIGNIFICANT CHANGE UP (ref 34.5–45)
HGB BLD-MCNC: 13.3 G/DL — SIGNIFICANT CHANGE UP (ref 11.5–15.5)
MCHC RBC-ENTMCNC: 21.4 PG — LOW (ref 27–34)
MCHC RBC-ENTMCNC: 30.4 GM/DL — LOW (ref 32–36)
MCV RBC AUTO: 70.4 FL — LOW (ref 80–100)
NRBC # BLD: 0 /100 WBCS — SIGNIFICANT CHANGE UP (ref 0–0)
PLATELET # BLD AUTO: 303 K/UL — SIGNIFICANT CHANGE UP (ref 150–400)
POTASSIUM SERPL-MCNC: 3.4 MMOL/L — LOW (ref 3.5–5.3)
POTASSIUM SERPL-SCNC: 3.4 MMOL/L — LOW (ref 3.5–5.3)
RBC # BLD: 6.22 M/UL — HIGH (ref 3.8–5.2)
RBC # FLD: 20.2 % — HIGH (ref 10.3–14.5)
SODIUM SERPL-SCNC: 133 MMOL/L — LOW (ref 135–145)
TOXOCARA ANTIBODY IGG RESULT: 3 U — SIGNIFICANT CHANGE UP
WBC # BLD: 9.86 K/UL — SIGNIFICANT CHANGE UP (ref 3.8–10.5)
WBC # FLD AUTO: 9.86 K/UL — SIGNIFICANT CHANGE UP (ref 3.8–10.5)

## 2024-10-27 PROCEDURE — 99231 SBSQ HOSP IP/OBS SF/LOW 25: CPT

## 2024-10-27 RX ORDER — IBUPROFEN 200 MG
400 TABLET ORAL EVERY 8 HOURS
Refills: 0 | Status: DISCONTINUED | OUTPATIENT
Start: 2024-10-27 | End: 2024-10-30

## 2024-10-27 RX ORDER — POTASSIUM CHLORIDE 10 MEQ
40 TABLET, EXTENDED RELEASE ORAL ONCE
Refills: 0 | Status: COMPLETED | OUTPATIENT
Start: 2024-10-27 | End: 2024-10-27

## 2024-10-27 RX ADMIN — IPRATROPIUM BROMIDE 500 MICROGRAM(S): 0.5 SOLUTION RESPIRATORY (INHALATION) at 06:31

## 2024-10-27 RX ADMIN — Medication 650 MILLIGRAM(S): at 08:47

## 2024-10-27 RX ADMIN — Medication 20 MILLIGRAM(S): at 08:20

## 2024-10-27 RX ADMIN — IPRATROPIUM BROMIDE 500 MICROGRAM(S): 0.5 SOLUTION RESPIRATORY (INHALATION) at 23:25

## 2024-10-27 RX ADMIN — MUPIROCIN 1 APPLICATION(S): 20 OINTMENT TOPICAL at 06:19

## 2024-10-27 RX ADMIN — Medication 30 MILLIGRAM(S): at 11:17

## 2024-10-27 RX ADMIN — Medication 650 MILLIGRAM(S): at 08:17

## 2024-10-27 RX ADMIN — IPRATROPIUM BROMIDE 500 MICROGRAM(S): 0.5 SOLUTION RESPIRATORY (INHALATION) at 11:18

## 2024-10-27 RX ADMIN — IPRATROPIUM BROMIDE 500 MICROGRAM(S): 0.5 SOLUTION RESPIRATORY (INHALATION) at 16:46

## 2024-10-27 RX ADMIN — Medication 40 MILLIEQUIVALENT(S): at 12:45

## 2024-10-27 RX ADMIN — MUPIROCIN 1 APPLICATION(S): 20 OINTMENT TOPICAL at 16:46

## 2024-10-27 RX ADMIN — Medication 400 MILLIGRAM(S): at 16:52

## 2024-10-27 RX ADMIN — Medication 5 MILLIGRAM(S): at 06:19

## 2024-10-27 RX ADMIN — PANTOPRAZOLE SODIUM 40 MILLIGRAM(S): 40 TABLET, DELAYED RELEASE ORAL at 06:18

## 2024-10-27 RX ADMIN — RIVAROXABAN 20 MILLIGRAM(S): 20 TABLET, FILM COATED ORAL at 16:46

## 2024-10-27 RX ADMIN — FOLIC ACID 1 MILLIGRAM(S): 1 TABLET ORAL at 11:18

## 2024-10-27 RX ADMIN — TRAZODONE HYDROCHLORIDE 50 MILLIGRAM(S): 100 TABLET ORAL at 21:50

## 2024-10-27 RX ADMIN — Medication 20 MILLIGRAM(S): at 21:52

## 2024-10-27 RX ADMIN — MONTELUKAST SODIUM 10 MILLIGRAM(S): 10 TABLET, FILM COATED ORAL at 11:18

## 2024-10-27 RX ADMIN — Medication 400 MILLIGRAM(S): at 17:22

## 2024-10-27 RX ADMIN — Medication 50 MILLIGRAM(S): at 06:19

## 2024-10-27 RX ADMIN — Medication 50 MILLIGRAM(S): at 18:37

## 2024-10-27 RX ADMIN — Medication 20 MILLIGRAM(S): at 16:53

## 2024-10-27 RX ADMIN — IPRATROPIUM BROMIDE 500 MICROGRAM(S): 0.5 SOLUTION RESPIRATORY (INHALATION) at 06:20

## 2024-10-27 RX ADMIN — Medication 1 MILLIGRAM(S): at 06:19

## 2024-10-27 NOTE — PROGRESS NOTE ADULT - SUBJECTIVE AND OBJECTIVE BOX
[facility] Division of Hospital Medicine  Elvie Fraser MD  Available via MS Teams    SUBJECTIVE / OVERNIGHT EVENTS:  No acute events overnight. +Menstrual cramps    MEDICATIONS  (STANDING):  buDESOnide    Inhalation Suspension 0.5 milliGRAM(s) Inhalation two times a day  buMETAnide 1 milliGRAM(s) Oral daily  folic acid 1 milliGRAM(s) Oral daily  hepatitis B Vaccine - Adult (HEPLISAV-B) 20 MICROGram(s) IntraMuscular once  influenza   Vaccine 0.5 milliLiter(s) IntraMuscular once  ipratropium    for Nebulization 500 MICROGram(s) Nebulizer every 6 hours  montelukast 10 milliGRAM(s) Oral daily  mupirocin 2% Nasal 1 Application(s) Both Nostrils two times a day  NIFEdipine XL 30 milliGRAM(s) Oral <User Schedule>  pantoprazole    Tablet 40 milliGRAM(s) Oral before breakfast  potassium chloride    Tablet ER 40 milliEquivalent(s) Oral every 4 hours  predniSONE   Tablet 5 milliGRAM(s) Oral daily  rivaroxaban 20 milliGRAM(s) Oral with dinner  sildenafil (REVATIO) 20 milliGRAM(s) Oral <User Schedule>  sodium chloride 0.9%. 500 milliLiter(s) (30 mL/Hr) IV Continuous <Continuous>  spironolactone 50 milliGRAM(s) Oral two times a day  traZODone 50 milliGRAM(s) Oral at bedtime  Treprostinil SubQ Continous Infusion 51 NANOGram(s)/kG/Min 0.056 mL/Hr (0.06 mL/Hr) IV Continuous <Continuous>    MEDICATIONS  (PRN):  acetaminophen     Tablet .. 650 milliGRAM(s) Oral every 6 hours PRN Temp greater or equal to 38C (100.4F), Mild Pain (1 - 3)  acetaminophen 325 mG/butalbital 50 mG/caffeine 40 mG 1 Tablet(s) Oral every 8 hours PRN Mild Pain (1 - 3)  aluminum hydroxide/magnesium hydroxide/simethicone Suspension 30 milliLiter(s) Oral every 4 hours PRN Dyspepsia  hydrocortisone 1% Ointment 1 Application(s) Topical two times a day PRN Itching  ibuprofen  Tablet. 400 milliGRAM(s) Oral every 8 hours PRN Moderate Pain (4 - 6), Severe Pain (7 - 10)  SUMAtriptan 50 milliGRAM(s) Oral every 12 hours PRN Moderate to Severe Headache      I&O's Summary    26 Oct 2024 07:01  -  27 Oct 2024 07:00  --------------------------------------------------------  IN: 880 mL / OUT: 950 mL / NET: -70 mL    27 Oct 2024 07:01  -  27 Oct 2024 13:50  --------------------------------------------------------  IN: 520 mL / OUT: 0 mL / NET: 520 mL        PHYSICAL EXAM:  Vital Signs Last 24 Hrs  T(C): 37.2 (27 Oct 2024 11:05), Max: 37.2 (27 Oct 2024 11:05)  T(F): 99 (27 Oct 2024 11:05), Max: 99 (27 Oct 2024 11:05)  HR: 91 (27 Oct 2024 11:05) (91 - 106)  BP: 105/68 (27 Oct 2024 11:05) (103/67 - 125/82)  BP(mean): 96 (26 Oct 2024 20:28) (95 - 96)  RR: 18 (27 Oct 2024 11:05) (18 - 18)  SpO2: 97% (27 Oct 2024 11:05) (97% - 97%)    Parameters below as of 27 Oct 2024 11:05  Patient On (Oxygen Delivery Method): nasal cannula      CONSTITUTIONAL: NAD  EYES: EOMI  ENMT: Moist oral mucosa  RESPIRATORY: Normal respiratory effort; scattered rhonchi  CARDIOVASCULAR: Regular rate and rhythm, no murmurs, no LE edema  ABDOMEN: Nontender to palpation, normoactive bowel sounds, no rebound/guarding  MUSCULOSKELETAL:  no joint swelling or tenderness to palpation  PSYCH: A+O to person, place, and time; affect appropriate  NEUROLOGY: strength and sensation grossly intact  SKIN: No rashes; no palpable lesions      LABS:                        13.3   9.86  )-----------( 303      ( 27 Oct 2024 09:04 )             43.8     10-27    133[L]  |  97  |  17  ----------------------------<  154[H]  3.4[L]   |  20[L]  |  1.13    Ca    8.7      27 Oct 2024 09:04            Urinalysis Basic - ( 27 Oct 2024 09:04 )    Color: x / Appearance: x / SG: x / pH: x  Gluc: 154 mg/dL / Ketone: x  / Bili: x / Urobili: x   Blood: x / Protein: x / Nitrite: x   Leuk Esterase: x / RBC: x / WBC x   Sq Epi: x / Non Sq Epi: x / Bacteria: x        Culture - Urine (collected 25 Oct 2024 07:19)  Source: Clean Catch Clean Catch (Midstream)  Preliminary Report (26 Oct 2024 16:48):    50,000 - 99,000 CFU/mL Gram Negative Rods    <10,000 CFU/ml Normal Urogenital deborah present      SARS-CoV-2: NotDetec (23 Oct 2024 18:12)  SARS-CoV-2: NotDetec (14 Oct 2024 13:58)      RADIOLOGY & ADDITIONAL TESTS:  New Imaging Personally Reviewed Today:  New Electrocardiogram Personally Reviewed Today:  Other Results Reviewed Today:   Prior or Outpatient Records Reviewed Today with Summary:    COORDINATION OF CARE:  Consultant Communication and Details of Discussion (where applicable):

## 2024-10-27 NOTE — CHART NOTE - NSCHARTNOTEFT_GEN_A_CORE
Lung transplant note  Undergoing lung transplant evaluation.  Plan to order weight loss medication at discharge with plans to follow-up with endocrine.  Plan for setting up with endocrine consult while in hospital to assist with weight loss    Mckay Fritz MD, MPH   Lung Transplantation  , Pulmonary and Critical care Medicine  [facility]

## 2024-10-27 NOTE — PROGRESS NOTE ADULT - PROBLEM SELECTOR PLAN 3
Imaging and laboratory testing for transplant eval ordered  - Lung TXP team following, f/u recs  - Dermatology, dental, psych consulted  - Will eventually need [facility]  - D/w ophthalmology, no need for inpatient evaluation as sx are unchanged from prior and has been seen by outpatient providers for this  - Will need to be discharged on ozempic to assist with weight loss - covered $0 copay  - cardiac MR for lung txp eval and MRCP for incidental PD dilatation, unable to obtain as patient is dependent on home pump for Remodulin. Per pulmonary - unable to pause and unable to transition off home pump to hospital pump. S/p MUGA and EUS/ERCP. WNL  - pending Esophagram/fluoro of diaphragm on Monday

## 2024-10-27 NOTE — PROGRESS NOTE ADULT - PROBLEM SELECTOR PLAN 4
Low grade fever 100.6 on 10/22. No new sx. UA neg. CT CAP without evidence of infection. Partial RVP neg. BCx NGTD.  - f/u bcx - NGTD  - monitor off abx  - TXP ID following, f/u recs

## 2024-10-27 NOTE — PROGRESS NOTE ADULT - PROBLEM SELECTOR PROBLEM 12
Pulmonary embolism
Prophylactic measure
Pulmonary embolism
Pulmonary embolism

## 2024-10-27 NOTE — PROGRESS NOTE ADULT - PROBLEM SELECTOR PROBLEM 10
Chronic respiratory failure with hypoxia
Prophylactic measure
Pulmonary embolism
Prophylactic measure
Chronic respiratory failure with hypoxia
Prophylactic measure
Chronic respiratory failure with hypoxia
Asthma
Prophylactic measure

## 2024-10-27 NOTE — PROGRESS NOTE ADULT - PROBLEM SELECTOR PLAN 11
History of asthma, on Atrovent PRN outpatient, was on Prednisone 10mg QD for pHTN    Plan:  - Pulbritton aguero appreciated  - Will give Atrovent nebulizer q6h, patient refused albuterol stating it makes her heart race  - Will c/w Prednisone 5mg  - Start Singulair 10mg PO  - c/w Budesonide 0.5mg inhaled BID
History of asthma, on Atrovent PRN outpatient, was on Prednisone 10mg QD for pHTN    Plan:  - Pulbritton aguero appreciated  - Will give Atrovent nebulizer q6h, patient refused albuterol stating it makes her heart race  - Will c/w Prednisone 5mg  - Start Singulair 10mg PO  - c/w Budesonide 0.5mg inhaled BID
Diet: Regular  DVT ppx: Xarelto 20mg QD  Disposition: Pending course  Code: Full code    Fluticasone and antihistamines for allergies/nasal congestion
History of asthma, on Atrovent PRN outpatient, was on Prednisone 10mg QD for pHTN    Plan:  - Pulbritton aguero appreciated  - Will give Atrovent nebulizer q6h, patient refused albuterol stating it makes her heart race  - Will c/w Prednisone 5mg  - Start Singulair 10mg PO  - c/w Budesonide 0.5mg inhaled BID
History of asthma, on Atrovent PRN outpatient, was on Prednisone 10mg QD for pHTN    Plan:  - Pulbritton aguero appreciated  - Will give Atrovent nebulizer q6h, patient refused albuterol stating it makes her heart race  - Will c/w Prednisone 5mg  - Start Singulair 10mg PO  - c/w Budesonide 0.5mg inhaled BID
History of PE. RA clot s/p aspiration 2022    Plan:  -C/w Xarelto
History of asthma, on Atrovent PRN outpatient, was on Prednisone 10mg QD for pHTN    Plan:  - Pulbritton aguero appreciated  - Will give Atrovent nebulizer q6h, patient refused albuterol stating it makes her heart race  - Will c/w Prednisone 5mg  - Start Singulair 10mg PO  - c/w Budesonide 0.5mg inhaled BID

## 2024-10-28 DIAGNOSIS — Z29.9 ENCOUNTER FOR PROPHYLACTIC MEASURES, UNSPECIFIED: ICD-10-CM

## 2024-10-28 LAB
-  AMOXICILLIN/CLAVULANIC ACID: SIGNIFICANT CHANGE UP
-  AMPICILLIN/SULBACTAM: SIGNIFICANT CHANGE UP
-  AMPICILLIN: SIGNIFICANT CHANGE UP
-  AZTREONAM: SIGNIFICANT CHANGE UP
-  CEFAZOLIN: SIGNIFICANT CHANGE UP
-  CEFEPIME: SIGNIFICANT CHANGE UP
-  CEFOXITIN: SIGNIFICANT CHANGE UP
-  CEFTRIAXONE: SIGNIFICANT CHANGE UP
-  CEFUROXIME: SIGNIFICANT CHANGE UP
-  CIPROFLOXACIN: SIGNIFICANT CHANGE UP
-  ERTAPENEM: SIGNIFICANT CHANGE UP
-  GENTAMICIN: SIGNIFICANT CHANGE UP
-  LEVOFLOXACIN: SIGNIFICANT CHANGE UP
-  MEROPENEM: SIGNIFICANT CHANGE UP
-  NITROFURANTOIN: SIGNIFICANT CHANGE UP
-  PIPERACILLIN/TAZOBACTAM: SIGNIFICANT CHANGE UP
-  TOBRAMYCIN: SIGNIFICANT CHANGE UP
-  TRIMETHOPRIM/SULFAMETHOXAZOLE: SIGNIFICANT CHANGE UP
ANION GAP SERPL CALC-SCNC: 16 MMOL/L — SIGNIFICANT CHANGE UP (ref 5–17)
BUN SERPL-MCNC: 17 MG/DL — SIGNIFICANT CHANGE UP (ref 7–23)
CALCIUM SERPL-MCNC: 9.1 MG/DL — SIGNIFICANT CHANGE UP (ref 8.4–10.5)
CHLORIDE SERPL-SCNC: 97 MMOL/L — SIGNIFICANT CHANGE UP (ref 96–108)
CO2 SERPL-SCNC: 20 MMOL/L — LOW (ref 22–31)
CREAT SERPL-MCNC: 1.22 MG/DL — SIGNIFICANT CHANGE UP (ref 0.5–1.3)
CULTURE RESULTS: ABNORMAL
CULTURE RESULTS: SIGNIFICANT CHANGE UP
EGFR: 56 ML/MIN/1.73M2 — LOW
GLUCOSE SERPL-MCNC: 126 MG/DL — HIGH (ref 70–99)
HCT VFR BLD CALC: 44.9 % — SIGNIFICANT CHANGE UP (ref 34.5–45)
HGB BLD-MCNC: 13.7 G/DL — SIGNIFICANT CHANGE UP (ref 11.5–15.5)
MCHC RBC-ENTMCNC: 21.4 PG — LOW (ref 27–34)
MCHC RBC-ENTMCNC: 30.5 GM/DL — LOW (ref 32–36)
MCV RBC AUTO: 70.3 FL — LOW (ref 80–100)
METHOD TYPE: SIGNIFICANT CHANGE UP
NRBC # BLD: 0 /100 WBCS — SIGNIFICANT CHANGE UP (ref 0–0)
ORGANISM # SPEC MICROSCOPIC CNT: ABNORMAL
ORGANISM # SPEC MICROSCOPIC CNT: ABNORMAL
PETH 16:0/18:1: NEGATIVE NG/ML — SIGNIFICANT CHANGE UP
PETH 16:0/18:2: NEGATIVE NG/ML — SIGNIFICANT CHANGE UP
PETH COMMENTS: SIGNIFICANT CHANGE UP
PLATELET # BLD AUTO: 379 K/UL — SIGNIFICANT CHANGE UP (ref 150–400)
POTASSIUM SERPL-MCNC: 3.9 MMOL/L — SIGNIFICANT CHANGE UP (ref 3.5–5.3)
POTASSIUM SERPL-SCNC: 3.9 MMOL/L — SIGNIFICANT CHANGE UP (ref 3.5–5.3)
PROT S FREE PPP-ACNC: 130 % — SIGNIFICANT CHANGE UP (ref 63–140)
RBC # BLD: 6.39 M/UL — HIGH (ref 3.8–5.2)
RBC # FLD: 20.3 % — HIGH (ref 10.3–14.5)
RHEUMATOID FACT SERPL-ACNC: <10 IU/ML — SIGNIFICANT CHANGE UP (ref 0–13)
SODIUM SERPL-SCNC: 133 MMOL/L — LOW (ref 135–145)
SPECIMEN SOURCE: SIGNIFICANT CHANGE UP
SPECIMEN SOURCE: SIGNIFICANT CHANGE UP
WBC # BLD: 10.53 K/UL — HIGH (ref 3.8–10.5)
WBC # FLD AUTO: 10.53 K/UL — HIGH (ref 3.8–10.5)

## 2024-10-28 PROCEDURE — 99232 SBSQ HOSP IP/OBS MODERATE 35: CPT

## 2024-10-28 PROCEDURE — 99233 SBSQ HOSP IP/OBS HIGH 50: CPT

## 2024-10-28 RX ORDER — MELATONIN 5 MG
3 TABLET ORAL ONCE
Refills: 0 | Status: COMPLETED | OUTPATIENT
Start: 2024-10-28 | End: 2024-10-28

## 2024-10-28 RX ADMIN — Medication 20 MILLIGRAM(S): at 10:16

## 2024-10-28 RX ADMIN — MONTELUKAST SODIUM 10 MILLIGRAM(S): 10 TABLET, FILM COATED ORAL at 13:51

## 2024-10-28 RX ADMIN — Medication 5 MILLIGRAM(S): at 06:34

## 2024-10-28 RX ADMIN — IPRATROPIUM BROMIDE 500 MICROGRAM(S): 0.5 SOLUTION RESPIRATORY (INHALATION) at 13:50

## 2024-10-28 RX ADMIN — RIVAROXABAN 20 MILLIGRAM(S): 20 TABLET, FILM COATED ORAL at 17:10

## 2024-10-28 RX ADMIN — IPRATROPIUM BROMIDE 500 MICROGRAM(S): 0.5 SOLUTION RESPIRATORY (INHALATION) at 17:07

## 2024-10-28 RX ADMIN — Medication 3 MILLIGRAM(S): at 22:57

## 2024-10-28 RX ADMIN — Medication 50 MILLIGRAM(S): at 17:07

## 2024-10-28 RX ADMIN — PANTOPRAZOLE SODIUM 40 MILLIGRAM(S): 40 TABLET, DELAYED RELEASE ORAL at 06:34

## 2024-10-28 RX ADMIN — Medication 20 MILLIGRAM(S): at 16:05

## 2024-10-28 RX ADMIN — Medication 650 MILLIGRAM(S): at 13:53

## 2024-10-28 RX ADMIN — Medication 650 MILLIGRAM(S): at 14:23

## 2024-10-28 RX ADMIN — Medication 50 MILLIGRAM(S): at 06:34

## 2024-10-28 RX ADMIN — IPRATROPIUM BROMIDE 500 MICROGRAM(S): 0.5 SOLUTION RESPIRATORY (INHALATION) at 22:44

## 2024-10-28 RX ADMIN — Medication 20 MILLIGRAM(S): at 22:42

## 2024-10-28 RX ADMIN — FOLIC ACID 1 MILLIGRAM(S): 1 TABLET ORAL at 13:51

## 2024-10-28 RX ADMIN — Medication 30 MILLIGRAM(S): at 13:50

## 2024-10-28 RX ADMIN — IPRATROPIUM BROMIDE 500 MICROGRAM(S): 0.5 SOLUTION RESPIRATORY (INHALATION) at 06:34

## 2024-10-28 RX ADMIN — Medication 1 MILLIGRAM(S): at 06:33

## 2024-10-28 NOTE — PROGRESS NOTE ADULT - ASSESSMENT
44 yr old female with PMHx of PulmHTN class I/VI (dx 2014) on continuous Treprostinil (Remodulin) on home O2 (2L), c/b Rt heart failure s/p PPM (Dual chamber 2016), Chronic P.E. on DOAC (dx 2017), SVT s/p ablation (5/2020), Asthma (chronic steroid use - prednisone), JULIA who presented to the hospital with complaint of palpitations and dyspnea.    #pre lung transplant evaluation  - Consented for lung transplant eval 10/22/24 - defer Hep C donors for now  - Noted barrier to transplant at this time is BMI >34 however patient is motivated and agreeable to all modalities of weight loss including chemical agents as discussed with endocrine team     - Patient aware to be candidate for txp she must reach weight of 190lb, her max weight was 300 lb when first met with our program in 2022 - to be addressed at outpatient follow ups  - Plan to complete non invasive diagnostics first as discussed with primary team at bedside     - Cardiac MRI - unable to be done --> ?MUGA scan performed, awaiting report [ ]     - V/Q Scan [x]     - CT C/A/P [x]     - echo w/ bubble study [x]  - F/u 6MWT w/ EOT [ ]    - EGD/EUS w/ GI on Friday 10/25/24 - cleared from GI standpoint  - Esophagram/fluoro of diaphragm planned for today [ ]  - Tentative d/c home this week    pHTN:  - Cont Sildenafil, treprostinil  - Pred 5  - Supplemental O2 as needed to maintain SpO2 >92% 44 yr old female with PMHx of PulmHTN class I/VI (dx 2014) on continuous Treprostinil (Remodulin) on home O2 (2L), c/b Rt heart failure s/p PPM (Dual chamber 2016), Chronic P.E. on DOAC (dx 2017), SVT s/p ablation (5/2020), Asthma (chronic steroid use - prednisone), JULIA who presented to the hospital with complaint of palpitations and dyspnea.    #pre lung transplant evaluation  - Consented for lung transplant eval 10/22/24 - defer Hep C donors for now  - Noted barrier to transplant at this time is BMI >34 however patient is motivated and agreeable to all modalities of weight loss including chemical agents as discussed with endocrine team     - Patient aware to be candidate for txp she must reach weight of 190lb, her max weight was 300 lb when first met with our program in 2022 - to be addressed at outpatient follow ups  - Plan to complete non invasive diagnostics first as discussed with primary team at bedside     - Cardiac MRI - unable to be done --> MUGA scan [x]     - V/Q Scan [x]     - CT C/A/P [x]     - echo w/ bubble study [x]  - F/u 6MWT w/ EOT [ ]    - EGD/EUS w/ GI on Friday 10/25/24 - cleared from GI standpoint  - Esophagram/fluoro of diaphragm planned for today [ ]  - Tentative d/c home this week    pHTN:  - Cont Sildenafil, treprostinil  - Pred 5  - Supplemental O2 as needed to maintain SpO2 >92%

## 2024-10-28 NOTE — BH CONSULTATION LIAISON PROGRESS NOTE - NSICDXBHPRIMARYDX_PSY_ALL_CORE
Adjustment disorder with mixed anxiety and depressed mood   F43.23  
Adjustment disorder with mixed anxiety and depressed mood   F43.23

## 2024-10-28 NOTE — BH CONSULTATION LIAISON PROGRESS NOTE - CURRENT MEDICATION
MEDICATIONS  (STANDING):  buDESOnide    Inhalation Suspension 0.5 milliGRAM(s) Inhalation two times a day  buMETAnide 1 milliGRAM(s) Oral daily  folic acid 1 milliGRAM(s) Oral daily  hepatitis B Vaccine - Adult (HEPLISAV-B) 20 MICROGram(s) IntraMuscular once  influenza   Vaccine 0.5 milliLiter(s) IntraMuscular once  ipratropium    for Nebulization 500 MICROGram(s) Nebulizer every 6 hours  montelukast 10 milliGRAM(s) Oral daily  NIFEdipine XL 30 milliGRAM(s) Oral <User Schedule>  pantoprazole    Tablet 40 milliGRAM(s) Oral before breakfast  potassium chloride    Tablet ER 40 milliEquivalent(s) Oral every 4 hours  predniSONE   Tablet 5 milliGRAM(s) Oral daily  rivaroxaban 20 milliGRAM(s) Oral with dinner  sildenafil (REVATIO) 20 milliGRAM(s) Oral <User Schedule>  sodium chloride 0.9%. 500 milliLiter(s) (30 mL/Hr) IV Continuous <Continuous>  spironolactone 50 milliGRAM(s) Oral two times a day  traZODone 50 milliGRAM(s) Oral at bedtime  Treprostinil SubQ Continous Infusion 51 NANOGram(s)/kG/Min 0.056 mL/Hr (0.06 mL/Hr) IV Continuous <Continuous>    MEDICATIONS  (PRN):  acetaminophen     Tablet .. 650 milliGRAM(s) Oral every 6 hours PRN Temp greater or equal to 38C (100.4F), Mild Pain (1 - 3)  acetaminophen 325 mG/butalbital 50 mG/caffeine 40 mG 1 Tablet(s) Oral every 8 hours PRN Mild Pain (1 - 3)  aluminum hydroxide/magnesium hydroxide/simethicone Suspension 30 milliLiter(s) Oral every 4 hours PRN Dyspepsia  hydrocortisone 1% Ointment 1 Application(s) Topical two times a day PRN Itching  ibuprofen  Tablet. 400 milliGRAM(s) Oral every 8 hours PRN Moderate Pain (4 - 6), Severe Pain (7 - 10)  SUMAtriptan 50 milliGRAM(s) Oral every 12 hours PRN Moderate to Severe Headache  
MEDICATIONS  (STANDING):  buDESOnide    Inhalation Suspension 0.5 milliGRAM(s) Inhalation two times a day  buMETAnide 1 milliGRAM(s) Oral daily  folic acid 1 milliGRAM(s) Oral daily  influenza   Vaccine 0.5 milliLiter(s) IntraMuscular once  ipratropium    for Nebulization 500 MICROGram(s) Nebulizer every 6 hours  montelukast 10 milliGRAM(s) Oral daily  mupirocin 2% Nasal 1 Application(s) Both Nostrils two times a day  NIFEdipine XL 30 milliGRAM(s) Oral <User Schedule>  pantoprazole    Tablet 40 milliGRAM(s) Oral before breakfast  potassium chloride    Tablet ER 40 milliEquivalent(s) Oral every 4 hours  predniSONE   Tablet 5 milliGRAM(s) Oral daily  rivaroxaban 20 milliGRAM(s) Oral with dinner  sildenafil (REVATIO) 20 milliGRAM(s) Oral <User Schedule>  sodium chloride 0.9%. 500 milliLiter(s) (30 mL/Hr) IV Continuous <Continuous>  spironolactone 50 milliGRAM(s) Oral two times a day  traZODone 25 milliGRAM(s) Oral at bedtime  Treprostinil SubQ Continous Infusion 51 NANOGram(s)/kG/Min 0.056 mL/Hr (0.06 mL/Hr) IV Continuous <Continuous>    MEDICATIONS  (PRN):  acetaminophen     Tablet .. 650 milliGRAM(s) Oral every 6 hours PRN Temp greater or equal to 38C (100.4F), Mild Pain (1 - 3)  acetaminophen 325 mG/butalbital 50 mG/caffeine 40 mG 1 Tablet(s) Oral every 8 hours PRN Mild Pain (1 - 3)  aluminum hydroxide/magnesium hydroxide/simethicone Suspension 30 milliLiter(s) Oral every 4 hours PRN Dyspepsia  hydrocortisone 1% Ointment 1 Application(s) Topical two times a day PRN Itching  SUMAtriptan 50 milliGRAM(s) Oral every 12 hours PRN Moderate to Severe Headache

## 2024-10-28 NOTE — PROGRESS NOTE ADULT - ASSESSMENT
45 y/o F w/chronic hypoxemic respiratory failure secondary to pulmonary hypertension w/cor pulmonale (group I + IV) on treprostinil and Xarelto, RA thrombus s/p thrombectomy admitted for acute on chronic RV failure now started on CCBs and sildenafil. Tolerating well.     - Lung transplant workup as per transplant team  - Supplemental O2 as needed goal O2 sat > 93%  - Continue current doses of treprostinil, sildenafil, and CCBs  - Continue diuretics  - Continue prednisone 5mg daily  - Wegovy for weight loss once transplant workup concluded

## 2024-10-28 NOTE — PROGRESS NOTE ADULT - PROBLEM SELECTOR PLAN 1
Admitted with RV failure  - Supplemental O2 as needed goal O2 sat > 93%  - Continue current doses of treprostinil, sildenafil, and CCBs  - Continue diuretics  - Continue prednisone 5mg daily  - Wegovy for weight loss after discharge

## 2024-10-28 NOTE — PROGRESS NOTE ADULT - PROBLEM SELECTOR PLAN 3
DVT ppx - on rivaroxaban for hx DVT/PE  Diet - reg  Dispo - medically optimized after Esophagram/fluoro of diaphragm

## 2024-10-28 NOTE — PROGRESS NOTE ADULT - SUBJECTIVE AND OBJECTIVE BOX
CHIEF COMPLAINT: Dyspnea    Interval Events: No acute events, undergoing transplant workup. Feels ok this morning other than recurrence of headache. No dyspnea, chest pain, fevers, chills, nausea, emesis, or diarrhea.     REVIEW OF SYSTEMS:  See above. ROS otherwise negative.    OBJECTIVE:  ICU Vital Signs Last 24 Hrs  T(C): 37.4 (28 Oct 2024 12:18), Max: 37.4 (28 Oct 2024 12:18)  T(F): 99.3 (28 Oct 2024 12:18), Max: 99.3 (28 Oct 2024 12:18)  HR: 102 (28 Oct 2024 12:18) (90 - 102)  BP: 115/71 (28 Oct 2024 12:18) (101/70 - 115/71)  BP(mean): 77 (28 Oct 2024 05:08) (77 - 89)  ABP: --  ABP(mean): --  RR: 18 (28 Oct 2024 12:18) (17 - 18)  SpO2: 98% (28 Oct 2024 12:18) (94% - 98%)    O2 Parameters below as of 28 Oct 2024 12:18  Patient On (Oxygen Delivery Method): nasal cannula              10-27 @ 07:01  -  10-28 @ 07:00  --------------------------------------------------------  IN: 1120 mL / OUT: 600 mL / NET: 520 mL      CAPILLARY BLOOD GLUCOSE          PHYSICAL EXAM:  General: Adult female sitting comfortably in chair, NAD  HEENT: NC/AT sclerae anicteric  Neck: Supple  Respiratory: No increased WOB, CTAB  Cardiovascular: S1, S2  Abdomen: Soft, + BS  Extremities: WWP  Neurological: Awake, alert, follows commands  Psychiatry: Appropriate affect    HOSPITAL MEDICATIONS:  MEDICATIONS  (STANDING):  buDESOnide    Inhalation Suspension 0.5 milliGRAM(s) Inhalation two times a day  buMETAnide 1 milliGRAM(s) Oral daily  folic acid 1 milliGRAM(s) Oral daily  hepatitis B Vaccine - Adult (HEPLISAV-B) 20 MICROGram(s) IntraMuscular once  influenza   Vaccine 0.5 milliLiter(s) IntraMuscular once  ipratropium    for Nebulization 500 MICROGram(s) Nebulizer every 6 hours  montelukast 10 milliGRAM(s) Oral daily  NIFEdipine XL 30 milliGRAM(s) Oral <User Schedule>  pantoprazole    Tablet 40 milliGRAM(s) Oral before breakfast  potassium chloride    Tablet ER 40 milliEquivalent(s) Oral every 4 hours  predniSONE   Tablet 5 milliGRAM(s) Oral daily  rivaroxaban 20 milliGRAM(s) Oral with dinner  sildenafil (REVATIO) 20 milliGRAM(s) Oral <User Schedule>  sodium chloride 0.9%. 500 milliLiter(s) (30 mL/Hr) IV Continuous <Continuous>  spironolactone 50 milliGRAM(s) Oral two times a day  traZODone 50 milliGRAM(s) Oral at bedtime  Treprostinil SubQ Continous Infusion 51 NANOGram(s)/kG/Min 0.056 mL/Hr (0.06 mL/Hr) IV Continuous <Continuous>    MEDICATIONS  (PRN):  acetaminophen     Tablet .. 650 milliGRAM(s) Oral every 6 hours PRN Temp greater or equal to 38C (100.4F), Mild Pain (1 - 3)  acetaminophen 325 mG/butalbital 50 mG/caffeine 40 mG 1 Tablet(s) Oral every 8 hours PRN Mild Pain (1 - 3)  aluminum hydroxide/magnesium hydroxide/simethicone Suspension 30 milliLiter(s) Oral every 4 hours PRN Dyspepsia  hydrocortisone 1% Ointment 1 Application(s) Topical two times a day PRN Itching  ibuprofen  Tablet. 400 milliGRAM(s) Oral every 8 hours PRN Moderate Pain (4 - 6), Severe Pain (7 - 10)  SUMAtriptan 50 milliGRAM(s) Oral every 12 hours PRN Moderate to Severe Headache      LABS:                        13.7   10.53 )-----------( 379      ( 28 Oct 2024 09:39 )             44.9     Hgb Trend: 13.7<--, 13.3<--, 14.6<--, 13.6<--, 13.0<--  10-28    133[L]  |  97  |  17  ----------------------------<  126[H]  3.9   |  20[L]  |  1.22    Ca    9.1      28 Oct 2024 09:39      Creatinine Trend: 1.22<--, 1.13<--, 1.17<--, 1.07<--, 1.07<--, 1.10<--    Urinalysis Basic - ( 28 Oct 2024 09:39 )    Color: x / Appearance: x / SG: x / pH: x  Gluc: 126 mg/dL / Ketone: x  / Bili: x / Urobili: x   Blood: x / Protein: x / Nitrite: x   Leuk Esterase: x / RBC: x / WBC x   Sq Epi: x / Non Sq Epi: x / Bacteria: x            MICROBIOLOGY:       RADIOLOGY:  [x ] Reviewed and interpreted by me    PULMONARY FUNCTION TESTS:    EKG:

## 2024-10-28 NOTE — BH CONSULTATION LIAISON PROGRESS NOTE - NSBHFUPINTERVALHXFT_PSY_A_CORE
Patient seen at bedside with attending, patient notes that she continues to have sleep difficulty over the last two nights while using Trazodone at its current dose. She denied any side effects with initiation of Trazodone on exam. Patient denied any symptoms of mood, noted feeling anxious at time due to current lung transplant evaluation, however denied any overt anxiety at this time. When asked about transplant related education patient, noted being educated regarding the need for lifelong medications, as well risk of both rejection and infection on exam. She is amenable to increasing Trazodone at this time to help with sleep architecture, she denied any ah/vh/hi/si, intent or plan on exam. 
Patient seen at bedside with attending, patient notes that she continues to have sleep difficulty over the last two nights while using Trazodone, experiencing anxious ruminations at night regarding lung transplant evaluation. Patient denied any symptoms of mood or panic attacks on exam, noted that she would like to try a different agent for sleep architecture. Patient educated about alternative medications, risk, benefits explained on exam, she denied any ah/vh/hi/si, intent or plan on exam.

## 2024-10-28 NOTE — PROGRESS NOTE ADULT - SUBJECTIVE AND OBJECTIVE BOX
Lung Transplant Progress Note  =====================================================  24 hour events: No overnight events    T(C): 36.9 (10-28-24 @ 05:08), Max: 37.2 (10-27-24 @ 11:05)  HR: 90 (10-28-24 @ 05:08) (90 - 102)  BP: 102/64 (10-28-24 @ 05:08) (101/70 - 113/76)  RR: 18 (10-28-24 @ 05:08) (17 - 18)  SpO2: 97% (10-28-24 @ 05:08) (94% - 97%)    PAST MEDICAL & SURGICAL HISTORY:  Tachycardia      Anemia      Pulmonary hypertension      Pulmonary embolism      Asthma  On home O2 nightly at 2L via NC      PE (pulmonary thromboembolism)  on Xarelto 10 mg daily      Sinusitis      Corneal disorder      Right heart failure      CAD (coronary artery disease)      H/O pulmonary hypertension      Pulmonary embolism      Asthma      History of tachycardia      On supplemental oxygen by nasal cannula  O2 @ 2L      Pacemaker      Skin mass  left upper thigh mass      History of tubal ligation      Pacemaker      H/O tubal ligation      History of pacemaker  12/19 - [employer] model [employer]gevity [id number]    Home Meds: Home Medications:  Atrovent 18 mcg/inh inhalation aerosol: 1 inhaled 4 times a day as needed for  shortness of breath and/or wheezing (14 Oct 2024 05:41)  omeprazole 40 mg oral delayed release capsule: 1 cap(s) orally once a day (14 Oct 2024 05:42)  predniSONE 10 mg oral tablet: 1 tab(s) orally once a day (14 Oct 2024 05:42)  Remodulin 5 mg/mL injectable solution: 1 application injectable once a day patient is taking 48ng/kg/min via her own SQ PUMP (14 Oct 2024 05:42)  rivaroxaban 20 mg oral tablet: 1 tab(s) orally once a day (before a meal) (14 Oct 2024 05:42)  sildenafil 20 mg oral tablet: 1 tab(s) orally every 8 hours (14 Oct 2024 05:42)  spironolactone 25 mg oral tablet: 2 tab(s) orally once a day (14 Oct 2024 05:42)    Allergies: Allergies    adhesives (Rash)  vancomycin (Rash)  penicillin (Rash)    Intolerances    REVIEW OF SYSTEMS  [x] A ten-point review of systems was otherwise negative except as noted.  [ ] Due to altered mental status/intubation, subjective information were not able to be obtained from the patient. History was obtained, to the extent possible, from review of the chart and collateral sources of information.      CURRENT MEDICATIONS:   Neurologic Medications  acetaminophen     Tablet .. 650 milliGRAM(s) Oral every 6 hours PRN Temp greater or equal to 38C (100.4F), Mild Pain (1 - 3)  acetaminophen 325 mG/butalbital 50 mG/caffeine 40 mG 1 Tablet(s) Oral every 8 hours PRN Mild Pain (1 - 3)  ibuprofen  Tablet. 400 milliGRAM(s) Oral every 8 hours PRN Moderate Pain (4 - 6), Severe Pain (7 - 10)  SUMAtriptan 50 milliGRAM(s) Oral every 12 hours PRN Moderate to Severe Headache  traZODone 50 milliGRAM(s) Oral at bedtime    Respiratory Medications  buDESOnide    Inhalation Suspension 0.5 milliGRAM(s) Inhalation two times a day  ipratropium    for Nebulization 500 MICROGram(s) Nebulizer every 6 hours  montelukast 10 milliGRAM(s) Oral daily    Cardiovascular Medications  buMETAnide 1 milliGRAM(s) Oral daily  NIFEdipine XL 30 milliGRAM(s) Oral <User Schedule>  sildenafil (REVATIO) 20 milliGRAM(s) Oral <User Schedule>  spironolactone 50 milliGRAM(s) Oral two times a day    Gastrointestinal Medications  aluminum hydroxide/magnesium hydroxide/simethicone Suspension 30 milliLiter(s) Oral every 4 hours PRN Dyspepsia  folic acid 1 milliGRAM(s) Oral daily  pantoprazole    Tablet 40 milliGRAM(s) Oral before breakfast  potassium chloride    Tablet ER 40 milliEquivalent(s) Oral every 4 hours  sodium chloride 0.9%. 500 milliLiter(s) IV Continuous <Continuous>    Genitourinary Medications    Hematologic/Oncologic Medications  hepatitis B Vaccine - Adult (HEPLISAV-B) 20 MICROGram(s) IntraMuscular once  influenza   Vaccine 0.5 milliLiter(s) IntraMuscular once  rivaroxaban 20 milliGRAM(s) Oral with dinner    Antimicrobial/Immunologic Medications    Endocrine/Metabolic Medications  predniSONE   Tablet 5 milliGRAM(s) Oral daily    Topical/Other Medications  hydrocortisone 1% Ointment 1 Application(s) Topical two times a day PRN Itching  Treprostinil SubQ Continous Infusion 51 NANOGram(s)/kG/Min 0.056 mL/Hr IV Continuous <Continuous>      INS/OUTS (last 24 hours):  I&O's Summary    27 Oct 2024 07:01  -  28 Oct 2024 07:00  --------------------------------------------------------  IN: 1120 mL / OUT: 600 mL / NET: 520 mL    --------------------------------------------------------------------------------------  PHYSICAL EXAM:  GENERAL: NAD, resting comfortably in bed  HEAD:  Atraumatic, normocephalic  EYES: EOMI, PERRLA, conjunctiva and sclera clear  NECK: Supple  CHEST/LUNG: good inspiratory effort  HEART: RRR. +S1/S2  ABDOMEN: Soft, nondistended  EXTREMITIES: No clubbing, cyanosis, or edema  PSYCH: A&O x3  NEUROLOGY: Non-focal, motor & sensory grossly intact  SKIN: Warm & dry, no rashes or lesions    LABS:                        13.3   9.86  )-----------( 303      ( 27 Oct 2024 09:04 )             43.8     10-27    133[L]  |  97  |  17  ----------------------------<  154[H]  3.4[L]   |  20[L]  |  1.13    Ca    8.7      27 Oct 2024 09:04        Urinalysis Basic - ( 27 Oct 2024 09:04 )    Color: x / Appearance: x / SG: x / pH: x  Gluc: 154 mg/dL / Ketone: x  / Bili: x / Urobili: x   Blood: x / Protein: x / Nitrite: x   Leuk Esterase: x / RBC: x / WBC x   Sq Epi: x / Non Sq Epi: x / Bacteria: x        CAPILLARY BLOOD GLUCOSE          RADIOLOGY:

## 2024-10-28 NOTE — BH CONSULTATION LIAISON PROGRESS NOTE - NSBHCHARTREVIEWLAB_PSY_A_CORE FT
13.7   10.53 )-----------( 379      ( 28 Oct 2024 09:39 )             44.9     10-28    133[L]  |  97  |  17  ----------------------------<  126[H]  3.9   |  20[L]  |  1.22    Ca    9.1      28 Oct 2024 09:39    
                              13.6   11.89 )-----------( 329      ( 25 Oct 2024 07:18 )             44.4     10-25    135  |  99  |  21  ----------------------------<  89  4.2   |  19[L]  |  1.17    Ca    9.4      25 Oct 2024 07:14    TPro  8.1  /  Alb  4.5  /  TBili  0.7  /  DBili  x   /  AST  15  /  ALT  14  /  AlkPhos  69  10-25

## 2024-10-28 NOTE — PROGRESS NOTE ADULT - SUBJECTIVE AND OBJECTIVE BOX
Leonard Hunter MD  Division of Hospital Medicine  Available on [employer] Teams    PROGRESS NOTE:     Patient is a 44y old  Female who presents with a chief complaint of palpitation, worsening SOUZA and chest tightness (28 Oct 2024 12:23)      SUBJECTIVE / OVERNIGHT EVENTS: Patient seen and examined. No acute overnight events. Asymptomatic today.    MEDICATIONS  (STANDING):  buDESOnide    Inhalation Suspension 0.5 milliGRAM(s) Inhalation two times a day  buMETAnide 1 milliGRAM(s) Oral daily  folic acid 1 milliGRAM(s) Oral daily  hepatitis B Vaccine - Adult (HEPLISAV-B) 20 MICROGram(s) IntraMuscular once  influenza   Vaccine 0.5 milliLiter(s) IntraMuscular once  ipratropium    for Nebulization 500 MICROGram(s) Nebulizer every 6 hours  montelukast 10 milliGRAM(s) Oral daily  NIFEdipine XL 30 milliGRAM(s) Oral <User Schedule>  pantoprazole    Tablet 40 milliGRAM(s) Oral before breakfast  potassium chloride    Tablet ER 40 milliEquivalent(s) Oral every 4 hours  predniSONE   Tablet 5 milliGRAM(s) Oral daily  rivaroxaban 20 milliGRAM(s) Oral with dinner  sildenafil (REVATIO) 20 milliGRAM(s) Oral <User Schedule>  sodium chloride 0.9%. 500 milliLiter(s) (30 mL/Hr) IV Continuous <Continuous>  spironolactone 50 milliGRAM(s) Oral two times a day  traZODone 50 milliGRAM(s) Oral at bedtime  Treprostinil SubQ Continous Infusion 51 NANOGram(s)/kG/Min 0.056 mL/Hr (0.06 mL/Hr) IV Continuous <Continuous>    MEDICATIONS  (PRN):  acetaminophen     Tablet .. 650 milliGRAM(s) Oral every 6 hours PRN Temp greater or equal to 38C (100.4F), Mild Pain (1 - 3)  acetaminophen 325 mG/butalbital 50 mG/caffeine 40 mG 1 Tablet(s) Oral every 8 hours PRN Mild Pain (1 - 3)  aluminum hydroxide/magnesium hydroxide/simethicone Suspension 30 milliLiter(s) Oral every 4 hours PRN Dyspepsia  hydrocortisone 1% Ointment 1 Application(s) Topical two times a day PRN Itching  ibuprofen  Tablet. 400 milliGRAM(s) Oral every 8 hours PRN Moderate Pain (4 - 6), Severe Pain (7 - 10)  SUMAtriptan 50 milliGRAM(s) Oral every 12 hours PRN Moderate to Severe Headache      CAPILLARY BLOOD GLUCOSE        I&O's Summary    27 Oct 2024 07:01  -  28 Oct 2024 07:00  --------------------------------------------------------  IN: 1120 mL / OUT: 600 mL / NET: 520 mL    28 Oct 2024 07:01  -  28 Oct 2024 16:00  --------------------------------------------------------  IN: 240 mL / OUT: 0 mL / NET: 240 mL        PHYSICAL EXAM:  Vital Signs Last 24 Hrs  T(C): 37.4 (28 Oct 2024 12:18), Max: 37.4 (28 Oct 2024 12:18)  T(F): 99.3 (28 Oct 2024 12:18), Max: 99.3 (28 Oct 2024 12:18)  HR: 126 (28 Oct 2024 14:15) (90 - 126)  BP: 119/79 (28 Oct 2024 13:52) (101/70 - 119/79)  BP(mean): 92 (28 Oct 2024 13:52) (77 - 92)  RR: 18 (28 Oct 2024 12:18) (17 - 18)  SpO2: 96% (28 Oct 2024 14:15) (94% - 98%)    Parameters below as of 28 Oct 2024 14:15  Patient On (Oxygen Delivery Method): nasal cannula  O2 Flow (L/min): 2    CONSTITUTIONAL: NAD  EYES: EOMI  ENMT: Moist oral mucosa  RESPIRATORY: Normal respiratory effort; scattered rhonchi  CARDIOVASCULAR: rrr, no murmurs, no LE edema  ABDOMEN: Nontender to palpation, normoactive bowel sounds, no rebound/guarding  MUSCULOSKELETAL:  no joint swelling or tenderness to palpation  PSYCH: A+O to person, place, and time; affect appropriate  NEUROLOGY: strength and sensation grossly intact  SKIN: No rashes; no palpable lesions    LABS:                        13.7   10.53 )-----------( 379      ( 28 Oct 2024 09:39 )             44.9     10-28    133[L]  |  97  |  17  ----------------------------<  126[H]  3.9   |  20[L]  |  1.22    Ca    9.1      28 Oct 2024 09:39            Urinalysis Basic - ( 28 Oct 2024 09:39 )    Color: x / Appearance: x / SG: x / pH: x  Gluc: 126 mg/dL / Ketone: x  / Bili: x / Urobili: x   Blood: x / Protein: x / Nitrite: x   Leuk Esterase: x / RBC: x / WBC x   Sq Epi: x / Non Sq Epi: x / Bacteria: x          RADIOLOGY & ADDITIONAL TESTS:  Results Reviewed: Y  Imaging Personally Reviewed:Y  Electrocardiogram Personally Reviewed:Y    COORDINATION OF CARE:  Care Discussed with Consultants/Other Providers [Y/N]:Y  Prior or Outpatient Records Reviewed [Y/N]:Y

## 2024-10-28 NOTE — BH CONSULTATION LIAISON PROGRESS NOTE - ATTENDING COMMENTS
This is a 44-yr-old AAF patient with no PPHx with PMHx of PulmHTN class I/VI since 2014 ( on continuous Treprostinil and home O2 (2L), Rt heart failure and bradycardia, s/p dual chamber PPM, RA thrombus s/p thrombectomy, chronic PE, abdominal wall abscesses now presenting with palpitations and dyspnea for whom Transplant psychiatry was consulted for psychosocial clearance evaluation for Lung transplant. Patient is able to rationally manipulate information about the transplant process, notably risks and some aftercare recommendations. Has limited to moderate understanding of her condition and it's proposed treatment. She does understand the prognosis with or without the transplant. Would continue to provide further education about the transplant process. No pertinent past psychiatric history but currently exhibiting signs of adjustment d/o in setting of decline in health/need for transplantation. Additionally reports initial insomnia from anxious ruminations.    I have seen and evaluated this patient myself. Chart, labs, meds reviewed. I agree with NP's assessment and plan.

## 2024-10-28 NOTE — BH CONSULTATION LIAISON PROGRESS NOTE - NSBHATTESTTYPEVISIT_PSY_A_CORE
On-site Attending supervising NAYELI ([id number] codes)
Attending evaluating patient with NAYELI ([id number] code)

## 2024-10-28 NOTE — BH CONSULTATION LIAISON PROGRESS NOTE - NSBHCHARTREVIEWVS_PSY_A_CORE FT
Vital Signs Last 24 Hrs  T(C): 36.9 (25 Oct 2024 12:53), Max: 36.9 (25 Oct 2024 12:53)  T(F): 98.4 (25 Oct 2024 12:53), Max: 98.4 (25 Oct 2024 12:53)  HR: 95 (25 Oct 2024 13:26) (89 - 115)  BP: 129/89 (25 Oct 2024 13:26) (101/61 - 137/100)  BP(mean): 89 (25 Oct 2024 10:03) (89 - 89)  RR: 17 (25 Oct 2024 13:26) (15 - 22)  SpO2: 95% (25 Oct 2024 13:26) (95% - 99%)    Parameters below as of 25 Oct 2024 13:26  Patient On (Oxygen Delivery Method): room air    
Vital Signs Last 24 Hrs  T(C): 37.4 (28 Oct 2024 12:18), Max: 37.4 (28 Oct 2024 12:18)  T(F): 99.3 (28 Oct 2024 12:18), Max: 99.3 (28 Oct 2024 12:18)  HR: 102 (28 Oct 2024 12:18) (90 - 102)  BP: 115/71 (28 Oct 2024 12:18) (101/70 - 115/71)  BP(mean): 77 (28 Oct 2024 05:08) (77 - 89)  RR: 18 (28 Oct 2024 12:18) (17 - 18)  SpO2: 98% (28 Oct 2024 12:18) (94% - 98%)    Parameters below as of 28 Oct 2024 12:18  Patient On (Oxygen Delivery Method): nasal cannula

## 2024-10-28 NOTE — BH CONSULTATION LIAISON PROGRESS NOTE - NSBHASSESSMENTFT_PSY_ALL_CORE
44-yr-old female with no PPHx with PMHx of PulmHTN class I/VI since 2014 ( on continuous Treprostinil and home O2 (2L), Rt heart failure and bradycardia, s/p dual chamber PPM, RA thrombus s/p thrombectomy, chronic PE, abdominal wall abscesses now presenting with palpitations and dyspnea for whom Transplant psychiatry was consulted for psychosocial clearance evaluation for Lung transplant. Patient is able to rationally manipulate information about the transplant process, notably risks and some aftercare recommendations. Has limited to moderate understanding of her condition and it's proposed treatment. She does understand the prognosis with or without the transplant. Would continue to provide further education about the transplant process. No pertinent past psychiatric history but currently exhibiting signs of adjustment d/o in setting of decline in health/need for transplantation. Additionally reports initial insomnia from anxious ruminations.    10/25:Patient on exam denied any symptoms of low mood, noted difficulty sleeping on Trazodone 25mg, and is amenable to increasing the dose at this time. Noted improvement regarding transplant education, regarding the need for lifelong medication, risk of infection and rejection post transplant on exam.   10/28:Patient on exam, denied any symptoms of mood, noted that she continues to experience sleep difficulty despite Trazodone use, amenable to different medication for sleep architecture at this time, she denied any other acute concerns on exam.     Plan:  -DISCONTINUE Trazodone   -Can consider starting Doxepin 50mg PO HS for sleep architecture    -SIPAT 14- Patient is deemed to a good candidate from psychosocial standpoint; score likely to improve with further education about transplant process and aftercare recommendations.  -Labs: UDS, PETH, Cotinine, B12/Folate, TSH w/ FT4  -Holistic RN consult recommended  -Non-pharmacologic means of anxiety alleviation recommended including progressive muscle relaxation, mindfulness and/or meditation   -Transplant psychiatry will continue to follow and reassess   44-yr-old female with no PPHx with PMHx of PulmHTN class I/VI since 2014 ( on continuous Treprostinil and home O2 (2L), Rt heart failure and bradycardia, s/p dual chamber PPM, RA thrombus s/p thrombectomy, chronic PE, abdominal wall abscesses now presenting with palpitations and dyspnea for whom Transplant psychiatry was consulted for psychosocial clearance evaluation for Lung transplant. Patient is able to rationally manipulate information about the transplant process, notably risks and some aftercare recommendations. Has limited to moderate understanding of her condition and it's proposed treatment. She does understand the prognosis with or without the transplant. Would continue to provide further education about the transplant process. No pertinent past psychiatric history but currently exhibiting signs of adjustment d/o in setting of decline in health/need for transplantation. Additionally reports initial insomnia from anxious ruminations.    10/25:Patient on exam denied any symptoms of low mood, noted difficulty sleeping on Trazodone 25mg, and is amenable to increasing the dose at this time. Noted improvement regarding transplant education, regarding the need for lifelong medication, risk of infection and rejection post transplant on exam.   10/28:Patient on exam, denied any symptoms of mood, noted that she continues to experience sleep difficulty despite Trazodone use, amenable to different medication for sleep architecture at this time, she denied any other acute concerns on exam.     Plan:  -DISCONTINUE Trazodone   -Can consider starting Doxepin 50mg PO HS for sleep architecture if QTC < 500    -SIPAT 14- Patient is deemed to a good candidate from psychosocial standpoint; score likely to improve with further education about transplant process and aftercare recommendations.  -Labs: UDS, PETH, Cotinine, B12/Folate, TSH w/ FT4  -Holistic RN consult recommended  -Non-pharmacologic means of anxiety alleviation recommended including progressive muscle relaxation, mindfulness and/or meditation   -Transplant psychiatry will continue to follow and reassess

## 2024-10-28 NOTE — BH CONSULTATION LIAISON PROGRESS NOTE - NSICDXBHSECONDARYDX_PSY_ALL_CORE
Pulmonary hypertension   I27.20  Prophylactic measure   Z29.9  Right heart failure   I50.810  Asthma   J45.909  Leukocytosis   D72.829  Pulmonary thromboembolism   [id number]  Chronic respiratory failure with hypoxia   J96.11  Epigastric pain   R10.13  Bradycardia   R00.1  Migraine headache   G43.909  History of DVT in adulthood   Z86.718  Pulmonary hypertension with acute cor pulmonale   I26.09  Palpitations   R00.2  Pulmonary embolism   I26.99  Vaginal bleeding   N93.9  Pre-transplant evaluation for lung transplant   Z01.818  Fever   R50.9  
Pulmonary hypertension   I27.20  Prophylactic measure   Z29.9  Right heart failure   I50.810  Asthma   J45.909  Leukocytosis   D72.829  Pulmonary thromboembolism   [id number]  Chronic respiratory failure with hypoxia   J96.11  Epigastric pain   R10.13  Bradycardia   R00.1  Migraine headache   G43.909  History of DVT in adulthood   Z86.718  Pulmonary hypertension with acute cor pulmonale   I26.09  Palpitations   R00.2  Pulmonary embolism   I26.99  Vaginal bleeding   N93.9  Pre-transplant evaluation for lung transplant   Z01.818  Fever   R50.9

## 2024-10-28 NOTE — BH CONSULTATION LIAISON PROGRESS NOTE - NSBHCHARTREVIEWINVESTIGATE_PSY_A_CORE FT
EKG 10/23      Ventricular Rate 87 BPM    Atrial Rate 87 BPM    P-R Interval 146 ms    QRS Duration 78 ms    Q-T Interval 374 ms    QTC Calculation(Bazett) 450 ms    P Axis 63 degrees    R Axis 146 degrees    T Axis 55 degrees    Diagnosis Line NORMAL SINUS RHYTHM  BIATRIAL ENLARGEMENT  RIGHT AXIS DEVIATION  PULMONARY DISEASE PATTERN  RIGHT VENTRICULAR HYPERTROPHY WITH REPOLARIZATION ABNORMALITY

## 2024-10-28 NOTE — PROGRESS NOTE ADULT - ASSESSMENT
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

## 2024-10-29 LAB
ANION GAP SERPL CALC-SCNC: 14 MMOL/L — SIGNIFICANT CHANGE UP (ref 5–17)
BUN SERPL-MCNC: 16 MG/DL — SIGNIFICANT CHANGE UP (ref 7–23)
CALCIUM SERPL-MCNC: 8.6 MG/DL — SIGNIFICANT CHANGE UP (ref 8.4–10.5)
CHLORIDE SERPL-SCNC: 99 MMOL/L — SIGNIFICANT CHANGE UP (ref 96–108)
CK MB CFR SERPL CALC: 1.6 NG/ML — SIGNIFICANT CHANGE UP (ref 0–3.8)
CO2 SERPL-SCNC: 19 MMOL/L — LOW (ref 22–31)
CREAT SERPL-MCNC: 1.02 MG/DL — SIGNIFICANT CHANGE UP (ref 0.5–1.3)
CULTURE RESULTS: SIGNIFICANT CHANGE UP
EGFR: 70 ML/MIN/1.73M2 — SIGNIFICANT CHANGE UP
GLUCOSE SERPL-MCNC: 91 MG/DL — SIGNIFICANT CHANGE UP (ref 70–99)
HCT VFR BLD CALC: 43.7 % — SIGNIFICANT CHANGE UP (ref 34.5–45)
HGB BLD-MCNC: 13.3 G/DL — SIGNIFICANT CHANGE UP (ref 11.5–15.5)
MCHC RBC-ENTMCNC: 21.5 PG — LOW (ref 27–34)
MCHC RBC-ENTMCNC: 30.4 GM/DL — LOW (ref 32–36)
MCV RBC AUTO: 70.5 FL — LOW (ref 80–100)
NRBC # BLD: 0 /100 WBCS — SIGNIFICANT CHANGE UP (ref 0–0)
PLATELET # BLD AUTO: 323 K/UL — SIGNIFICANT CHANGE UP (ref 150–400)
POTASSIUM SERPL-MCNC: 4.5 MMOL/L — SIGNIFICANT CHANGE UP (ref 3.5–5.3)
POTASSIUM SERPL-SCNC: 4.5 MMOL/L — SIGNIFICANT CHANGE UP (ref 3.5–5.3)
RBC # BLD: 6.2 M/UL — HIGH (ref 3.8–5.2)
RBC # FLD: 20.3 % — HIGH (ref 10.3–14.5)
SODIUM SERPL-SCNC: 132 MMOL/L — LOW (ref 135–145)
SPECIMEN SOURCE: SIGNIFICANT CHANGE UP
TROPONIN T, HIGH SENSITIVITY RESULT: <6 NG/L — SIGNIFICANT CHANGE UP (ref 0–51)
WBC # BLD: 10.26 K/UL — SIGNIFICANT CHANGE UP (ref 3.8–10.5)
WBC # FLD AUTO: 10.26 K/UL — SIGNIFICANT CHANGE UP (ref 3.8–10.5)

## 2024-10-29 PROCEDURE — 74221 X-RAY XM ESOPHAGUS 2CNTRST: CPT | Mod: 26

## 2024-10-29 PROCEDURE — 99232 SBSQ HOSP IP/OBS MODERATE 35: CPT

## 2024-10-29 PROCEDURE — 76000 FLUOROSCOPY <1 HR PHYS/QHP: CPT | Mod: 26,59

## 2024-10-29 PROCEDURE — 74018 RADEX ABDOMEN 1 VIEW: CPT | Mod: 26

## 2024-10-29 PROCEDURE — 93010 ELECTROCARDIOGRAM REPORT: CPT

## 2024-10-29 RX ORDER — POLYETHYLENE GLYCOL 3350 17 G/17G
17 POWDER, FOR SOLUTION ORAL DAILY
Refills: 0 | Status: DISCONTINUED | OUTPATIENT
Start: 2024-10-29 | End: 2024-10-30

## 2024-10-29 RX ORDER — SENNA 187 MG
2 TABLET ORAL AT BEDTIME
Refills: 0 | Status: DISCONTINUED | OUTPATIENT
Start: 2024-10-29 | End: 2024-10-30

## 2024-10-29 RX ORDER — ONDANSETRON HYDROCHLORIDE 2 MG/ML
4 INJECTION, SOLUTION INTRAMUSCULAR; INTRAVENOUS ONCE
Refills: 0 | Status: COMPLETED | OUTPATIENT
Start: 2024-10-29 | End: 2024-10-29

## 2024-10-29 RX ADMIN — PANTOPRAZOLE SODIUM 40 MILLIGRAM(S): 40 TABLET, DELAYED RELEASE ORAL at 05:41

## 2024-10-29 RX ADMIN — Medication 20 MILLIGRAM(S): at 22:38

## 2024-10-29 RX ADMIN — ONDANSETRON HYDROCHLORIDE 4 MILLIGRAM(S): 2 INJECTION, SOLUTION INTRAMUSCULAR; INTRAVENOUS at 04:40

## 2024-10-29 RX ADMIN — Medication 20 MILLIGRAM(S): at 07:37

## 2024-10-29 RX ADMIN — IPRATROPIUM BROMIDE 500 MICROGRAM(S): 0.5 SOLUTION RESPIRATORY (INHALATION) at 23:34

## 2024-10-29 RX ADMIN — IPRATROPIUM BROMIDE 500 MICROGRAM(S): 0.5 SOLUTION RESPIRATORY (INHALATION) at 16:57

## 2024-10-29 RX ADMIN — IPRATROPIUM BROMIDE 500 MICROGRAM(S): 0.5 SOLUTION RESPIRATORY (INHALATION) at 12:00

## 2024-10-29 RX ADMIN — Medication 30 MILLIGRAM(S): at 12:01

## 2024-10-29 RX ADMIN — Medication 20 MILLIGRAM(S): at 15:17

## 2024-10-29 RX ADMIN — ONDANSETRON HYDROCHLORIDE 4 MILLIGRAM(S): 2 INJECTION, SOLUTION INTRAMUSCULAR; INTRAVENOUS at 18:43

## 2024-10-29 RX ADMIN — MONTELUKAST SODIUM 10 MILLIGRAM(S): 10 TABLET, FILM COATED ORAL at 12:00

## 2024-10-29 RX ADMIN — IPRATROPIUM BROMIDE 500 MICROGRAM(S): 0.5 SOLUTION RESPIRATORY (INHALATION) at 05:39

## 2024-10-29 RX ADMIN — Medication 30 MILLILITER(S): at 18:01

## 2024-10-29 RX ADMIN — Medication 1 MILLIGRAM(S): at 05:40

## 2024-10-29 RX ADMIN — Medication 50 MILLIGRAM(S): at 16:58

## 2024-10-29 RX ADMIN — FOLIC ACID 1 MILLIGRAM(S): 1 TABLET ORAL at 12:01

## 2024-10-29 RX ADMIN — RIVAROXABAN 20 MILLIGRAM(S): 20 TABLET, FILM COATED ORAL at 17:02

## 2024-10-29 RX ADMIN — Medication 5 MILLIGRAM(S): at 05:40

## 2024-10-29 RX ADMIN — Medication 50 MILLIGRAM(S): at 05:40

## 2024-10-29 NOTE — PROGRESS NOTE ADULT - NS ATTEND AMEND GEN_ALL_CORE FT
Seen and examined patient with NP/PA.  Independently verified the review of systems, physical examination, labs, radiological imaging. Also discussed with consultants to formulate eventual assessment and plan.      Assessment    Undergoing lung transplant evaluation  Discussed regarding need for weight loss and consideration of medications to assist. Will need outpatient endocrine follow up   MRI cardiac could not be completed due to issues with compatibility of pump with MRI machine.  Okay to get MUGA scan for RV and LV systolic ejection fraction    Mckay Fritz MD, MPH   Lung Transplantation  , Pulmonary and Critical care Medicine  [facility]
Seen and examined patient with NP/PA.  Independently verified the review of systems, physical examination, labs, radiological imaging. Also discussed with consultants to formulate eventual assessment and plan.      Assessment and plan:   Undergoing lung transplant evaluation  Discussed regarding need for weight loss and consideration of medications to assist. Will need outpatient endocrine follow up     Mckay Fritz MD, MPH   Lung Transplantation  , Pulmonary and Critical care Medicine  [facility]
Seen and examined patient with NP/PA.  Independently verified the review of systems, physical examination, labs, radiological imaging. Also discussed with consultants to formulate eventual assessment and plan.      Assessment and plan:   Discussed her case in lung transplant multidisciplinary conference, especially concerns regarding frequent exacerbations and hospitalizations for pulmonary hypertension.  Decision made to start lung transplant evaluation at this time.    We will follow BMI closely.  She is currently at 36 and we encouraged her to achieve a goal below 32 with weight loss medications.  Endocrine following.    She is planned for cardiac MRI    Mckay Fritz MD, MPH   Lung Transplantation  , Pulmonary and Critical care Medicine  [facility]
Seen and examined patient with NP/PA.  Independently verified the review of systems, physical examination, labs, radiological imaging. Also discussed with consultants to formulate eventual assessment and plan.      Assessment and plan:     Started lung transplant evaluation  Discussed regarding need for weight loss and consideration of medications to assist  MRI cardiac could not be completed due to issues with compatibility of pump with MRI machine.  Okay to get MUGA scan for RV and LV systolic ejection fraction    Mckay Fritz MD, MPH   Lung Transplantation  , Pulmonary and Critical care Medicine  [facility]
As above. Lung transplant work up ongoing. Weight is barrier. Plan to follow up with weight loss center and look into starting GLP-1 receptor agonist to help facilitate sufficient weight loss.
As above. Lung transplant work up ongoing. Weight is barrier. Plan to follow up with weight loss center and look into starting GLP-1 receptor agonist to help facilitate sufficient weight loss.

## 2024-10-29 NOTE — CHART NOTE - NSCHARTNOTESELECT_GEN_ALL_CORE
Event Note
GI/Event Note
Endocrinology/Event Note
Event Note
Event Note
Nutrition Services
Transplant pulmonology/Event Note
eval consent/Event Note
pre lung evaluation./Event Note

## 2024-10-29 NOTE — PROGRESS NOTE ADULT - PROBLEM SELECTOR PROBLEM 1
Pulmonary hypertension

## 2024-10-29 NOTE — CHART NOTE - NSCHARTNOTEFT_GEN_A_CORE
Patient seen followed by transplant psychiatry as part of lung transplant evaluation. Spoke to covering ACP, regarding new recommendations to start Trazodone at 75mg for sleep architecture for patient  at this time. As per covering attending on service, do not start Doxepin at this time, would initiate Trazodone at increase dose, transplant psychiatry will continue to follow patient at this time.

## 2024-10-29 NOTE — PROGRESS NOTE ADULT - ASSESSMENT
44 yr old female with PMHx of PulmHTN class I/VI (dx 2014) on continuous Treprostinil (Remodulin) on home O2 (2L), c/b Rt heart failure s/p PPM (Dual chamber 2016), Chronic P.E. on DOAC (dx 2017), SVT s/p ablation (5/2020), Asthma (chronic steroid use - prednisone), JULIA who presented to the hospital with complaint of palpitations and dyspnea.    #pre lung transplant evaluation  - Consented for lung transplant eval 10/22/24 - defer Hep C donors for now  - Noted barrier to transplant at this time is BMI >34 however patient is motivated and agreeable to all modalities of weight loss including chemical agents as discussed with endocrine team     - Patient aware to be candidate for txp she must reach weight of 190lb, her max weight was 300 lb when first met with our program in 2022 - to be addressed at outpatient follow ups     - Made an appointment w/ weight loss clinic 12/17/24 at 3 PM: [address] [facility], [address]; [place] [address]  - Plan to complete non invasive diagnostics first as discussed with primary team at bedside     - Cardiac MRI - unable to be done --> MUGA scan [x]     - V/Q Scan [x]     - CT C/A/P [x]     - echo w/ bubble study [x]  - F/u 6MWT w/ EOT [ ]  - EGD/EUS w/ GI on Friday 10/25/24 - cleared from GI standpoint  - Esophagram/fluoro of diaphragm pending [ ]  - Tentative d/c home this week    pHTN:  - Cont Sildenafil, treprostinil  - Pred 5  - Supplemental O2 as needed to maintain SpO2 >92% 44 yr old female with PMHx of PulmHTN class I/VI (dx 2014) on continuous Treprostinil (Remodulin) on home O2 (2L), c/b Rt heart failure s/p PPM (Dual chamber 2016), Chronic P.E. on DOAC (dx 2017), SVT s/p ablation (5/2020), Asthma (chronic steroid use - prednisone), JULIA who presented to the hospital with complaint of palpitations and dyspnea.    #pre lung transplant evaluation  - Consented for lung transplant eval 10/22/24 - defer Hep C donors for now  - Noted barrier to transplant at this time is BMI >34 however patient is motivated and agreeable to all modalities of weight loss including chemical agents as discussed with endocrine team     - Patient aware to be candidate for txp she must reach weight of 190lb, her max weight was 300 lb when first met with our program in 2022 - to be addressed at outpatient follow ups     - Consulted endo inpt regarding recs on starting GLP-1's - however unable to assist, will need to follow up in the outpt setting     - Made an appointment w/ weight loss clinic 12/17/24 at 3 PM: [address], [address]; [place] [address]  - Plan to complete non invasive diagnostics first as discussed with primary team at bedside     - Cardiac MRI - unable to be done --> MUGA scan [x]     - V/Q Scan [x]     - CT C/A/P [x]     - echo w/ bubble study [x]  - F/u 6MWT w/ EOT [ ]  - EGD/EUS w/ GI on Friday 10/25/24 - cleared from GI standpoint  - Esophagram/fluoro of diaphragm pending [ ]  - Tentative d/c home this week    pHTN:  - Cont Sildenafil, treprostinil  - Pred 5  - Supplemental O2 as needed to maintain SpO2 >92%

## 2024-10-29 NOTE — PROGRESS NOTE ADULT - TIME BILLING
Reviewing the EMR, vitals, imaging, medication list recent labs, prior records, and coordinating care with medical providers. This time excludes procedures and teaching.
Reviewing the EMR, vitals, imaging, medication list, recent labs, prior records and coordinating care with medical providers. This time excludes procedures and teaching.
Medical management as above, reviewing chart and coordinating care with primary team/staff, as well as reviewing vitals, radiology, medication list, recent labs, and prior records.    Does not include teaching time.
Reviewing the EMR, vitals, imaging, medication list recent labs, prior records, and coordinating care with medical providers. This time excludes procedures and teaching.
Reviewing the EMR, vitals, imaging, medication list recent labs, prior records, and coordinating care with medical providers. This time excludes procedures and teaching.
- Reviewing, and interpreting labs and testing.  - Independently obtaining a review of systems and performing a physical exam  - Reviewing consultant documentation/recommendations in addition to discussing plan of care with consultants.  - Counselling and educating patient and family regarding interpretation of aforementioned items and plan of care.
Reviewing the EMR, vitals, imaging, medication list recent labs, prior records, and coordinating care with medical providers. This time excludes procedures and teaching.
Reviewing the EMR, vitals, imaging, medication list recent labs, prior records, and coordinating care with medical providers. This time excludes procedures and teaching.
Time spent on review of vitals, physical exam, documentation, and discussion of plan of care with patient, consultants and multidisciplinary team.
- Reviewing, and interpreting labs and testing.  - Independently obtaining a review of systems and performing a physical exam  - Reviewing consultant documentation/recommendations in addition to discussing plan of care with consultants.  - Counselling and educating patient and family regarding interpretation of aforementioned items and plan of care.
Reviewing the EMR, vitals, imaging, medication list, recent labs, prior records and coordinating care with medical providers. This time excludes procedures and teaching.
chart and data review, clinical assessment, and coordination of care. This excludes any time spent on separate procedures or teaching.
Reviewing the EMR, vitals, imaging, medication list, recent labs, prior records and coordinating care with medical providers. This time excludes procedures and teaching.
Time spent on review of vitals, physical exam, documentation, and discussion of plan of care with patient and multidisciplinary team.
Time spent on review of vitals, physical exam, documentation, and discussion of plan of care with patient and multidisciplinary team.
chart and data review, clinical assessment, and coordination of care. This excludes any time spent on separate procedures or teaching.
conducting Interview, examination, reviewing vitals, laboratory and radiological images; besides coordinating care with primary team and consultants.
- Review of chart and consultation notes  - Exam and discussion with patient  - Review of laboratory and imaging   - Establishing a care plan for patient  - Communication with other providers
Time spent on review of vitals, physical exam, documentation, and discussion of plan of care with patient and multidisciplinary team.
Time spent on review of vitals, physical exam, documentation, and discussion of plan of care with patient, consultants and multidisciplinary team.
time spent reviewing prior charts, meds, discussing plan with patient= 58 minutes
Time spent on review of vitals, physical exam, documentation, and discussion of plan of care with patient, consultants and multidisciplinary team.

## 2024-10-29 NOTE — PROGRESS NOTE ADULT - PROVIDER SPECIALTY LIST ADULT
Internal Medicine
Pulmonology
Pulmonology
Transplant Pulmonology
Hospitalist
Hospitalist
Pulmonology
Pulmonology
Transplant ID
Transplant ID
Transplant Pulmonology
Transplant Pulmonology
Electrophysiology
Heart Failure
Pulmonology
Transplant Pulmonology
Hospitalist
Pulmonology
Internal Medicine
Hospitalist
Internal Medicine
Hospitalist
Internal Medicine
Hospitalist
Internal Medicine
Hospitalist

## 2024-10-29 NOTE — PROGRESS NOTE ADULT - ASSESSMENT
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

## 2024-10-29 NOTE — PROGRESS NOTE ADULT - SUBJECTIVE AND OBJECTIVE BOX
Lung Transplant Progress Note  =====================================================  24 hour events: No new complaints.    T(C): 37.1 (10-29-24 @ 03:41), Max: 37.4 (10-28-24 @ 12:18)  HR: 96 (10-29-24 @ 03:41) (92 - 126)  BP: 101/67 (10-29-24 @ 03:41) (101/67 - 119/79)  RR: 18 (10-29-24 @ 03:41) (18 - 18)  SpO2: 98% (10-29-24 @ 03:41) (95% - 98%)    PAST MEDICAL & SURGICAL HISTORY:  Tachycardia      Anemia      Pulmonary hypertension      Pulmonary embolism      Asthma  On home O2 nightly at 2L via NC      PE (pulmonary thromboembolism)  on Xarelto 10 mg daily      Sinusitis      Corneal disorder      Right heart failure      CAD (coronary artery disease)      H/O pulmonary hypertension      Pulmonary embolism      Asthma      History of tachycardia      On supplemental oxygen by nasal cannula  O2 @ 2L      Pacemaker      Skin mass  left upper thigh mass      History of tubal ligation      Pacemaker      H/O tubal ligation      History of pacemaker  12/19 - [employer] model Ingevity 4469    Home Meds: Home Medications:  Atrovent 18 mcg/inh inhalation aerosol: 1 inhaled 4 times a day as needed for  shortness of breath and/or wheezing (14 Oct 2024 05:41)  omeprazole 40 mg oral delayed release capsule: 1 cap(s) orally once a day (14 Oct 2024 05:42)  predniSONE 10 mg oral tablet: 1 tab(s) orally once a day (14 Oct 2024 05:42)  Remodulin 5 mg/mL injectable solution: 1 application injectable once a day patient is taking 48ng/kg/min via her own SQ PUMP (14 Oct 2024 05:42)  rivaroxaban 20 mg oral tablet: 1 tab(s) orally once a day (before a meal) (14 Oct 2024 05:42)  sildenafil 20 mg oral tablet: 1 tab(s) orally every 8 hours (14 Oct 2024 05:42)  spironolactone 25 mg oral tablet: 2 tab(s) orally once a day (14 Oct 2024 05:42)    Allergies: Allergies    adhesives (Rash)  vancomycin (Rash)  penicillin (Rash)    Intolerances    REVIEW OF SYSTEMS  [x] A ten-point review of systems was otherwise negative except as noted.  [ ] Due to altered mental status/intubation, subjective information were not able to be obtained from the patient. History was obtained, to the extent possible, from review of the chart and collateral sources of information.    CURRENT MEDICATIONS:   Neurologic Medications  acetaminophen     Tablet .. 650 milliGRAM(s) Oral every 6 hours PRN Temp greater or equal to 38C (100.4F), Mild Pain (1 - 3)  acetaminophen 325 mG/butalbital 50 mG/caffeine 40 mG 1 Tablet(s) Oral every 8 hours PRN Mild Pain (1 - 3)  ibuprofen  Tablet. 400 milliGRAM(s) Oral every 8 hours PRN Moderate Pain (4 - 6), Severe Pain (7 - 10)  SUMAtriptan 50 milliGRAM(s) Oral every 12 hours PRN Moderate to Severe Headache    Respiratory Medications  buDESOnide    Inhalation Suspension 0.5 milliGRAM(s) Inhalation two times a day  ipratropium    for Nebulization 500 MICROGram(s) Nebulizer every 6 hours  montelukast 10 milliGRAM(s) Oral daily    Cardiovascular Medications  buMETAnide 1 milliGRAM(s) Oral daily  NIFEdipine XL 30 milliGRAM(s) Oral <User Schedule>  sildenafil (REVATIO) 20 milliGRAM(s) Oral <User Schedule>  spironolactone 50 milliGRAM(s) Oral two times a day    Gastrointestinal Medications  aluminum hydroxide/magnesium hydroxide/simethicone Suspension 30 milliLiter(s) Oral every 4 hours PRN Dyspepsia  folic acid 1 milliGRAM(s) Oral daily  pantoprazole    Tablet 40 milliGRAM(s) Oral before breakfast  potassium chloride    Tablet ER 40 milliEquivalent(s) Oral every 4 hours  sodium chloride 0.9%. 500 milliLiter(s) IV Continuous <Continuous>    Genitourinary Medications    Hematologic/Oncologic Medications  hepatitis B Vaccine - Adult (HEPLISAV-B) 20 MICROGram(s) IntraMuscular once  influenza   Vaccine 0.5 milliLiter(s) IntraMuscular once  rivaroxaban 20 milliGRAM(s) Oral with dinner    Antimicrobial/Immunologic Medications    Endocrine/Metabolic Medications  predniSONE   Tablet 5 milliGRAM(s) Oral daily    Topical/Other Medications  hydrocortisone 1% Ointment 1 Application(s) Topical two times a day PRN Itching  Treprostinil SubQ Continous Infusion 51 NANOGram(s)/kG/Min 0.056 mL/Hr IV Continuous <Continuous>      INS/OUTS (last 24 hours):  I&O's Summary    27 Oct 2024 07:01  -  28 Oct 2024 07:00  --------------------------------------------------------  IN: 1120 mL / OUT: 600 mL / NET: 520 mL    28 Oct 2024 07:01  -  29 Oct 2024 06:22  --------------------------------------------------------  IN: 1200 mL / OUT: 400 mL / NET: 800 mL      --------------------------------------------------------------------------------------  PHYSICAL EXAM:  GENERAL: NAD, resting comfortably in bed  HEAD:  Atraumatic, normocephalic  EYES: EOMI, PERRLA, conjunctiva and sclera clear  NECK: Supple  CHEST/LUNG: good inspiratory effort, intermittently using 2L O2  HEART: RRR. +S1/S2  ABDOMEN: Soft, nondistended  EXTREMITIES: No clubbing, cyanosis, or edema  PSYCH: A&O x3  NEUROLOGY: Non-focal, motor & sensory grossly intact  SKIN: Warm & dry, no rashes or lesions  LABS:                        13.7   10.53 )-----------( 379      ( 28 Oct 2024 09:39 )             44.9     10-28    133[L]  |  97  |  17  ----------------------------<  126[H]  3.9   |  20[L]  |  1.22    Ca    9.1      28 Oct 2024 09:39        Urinalysis Basic - ( 28 Oct 2024 09:39 )    Color: x / Appearance: x / SG: x / pH: x  Gluc: 126 mg/dL / Ketone: x  / Bili: x / Urobili: x   Blood: x / Protein: x / Nitrite: x   Leuk Esterase: x / RBC: x / WBC x   Sq Epi: x / Non Sq Epi: x / Bacteria: x        CAPILLARY BLOOD GLUCOSE          RADIOLOGY:   Lung Transplant Progress Note  =====================================================  24 hour events: No new complaints. Denies nausea, mild abdominal discomfort.    T(C): 37.1 (10-29-24 @ 03:41), Max: 37.4 (10-28-24 @ 12:18)  HR: 96 (10-29-24 @ 03:41) (92 - 126)  BP: 101/67 (10-29-24 @ 03:41) (101/67 - 119/79)  RR: 18 (10-29-24 @ 03:41) (18 - 18)  SpO2: 98% (10-29-24 @ 03:41) (95% - 98%)    PAST MEDICAL & SURGICAL HISTORY:  Tachycardia      Anemia      Pulmonary hypertension      Pulmonary embolism      Asthma  On home O2 nightly at 2L via NC      PE (pulmonary thromboembolism)  on Xarelto 10 mg daily      Sinusitis      Corneal disorder      Right heart failure      CAD (coronary artery disease)      H/O pulmonary hypertension      Pulmonary embolism      Asthma      History of tachycardia      On supplemental oxygen by nasal cannula  O2 @ 2L      Pacemaker      Skin mass  left upper thigh mass      History of tubal ligation      Pacemaker      H/O tubal ligation      History of pacemaker  12/19 - [employer] model Ingevity 4469    Home Meds: Home Medications:  Atrovent 18 mcg/inh inhalation aerosol: 1 inhaled 4 times a day as needed for  shortness of breath and/or wheezing (14 Oct 2024 05:41)  omeprazole 40 mg oral delayed release capsule: 1 cap(s) orally once a day (14 Oct 2024 05:42)  predniSONE 10 mg oral tablet: 1 tab(s) orally once a day (14 Oct 2024 05:42)  Remodulin 5 mg/mL injectable solution: 1 application injectable once a day patient is taking 48ng/kg/min via her own SQ PUMP (14 Oct 2024 05:42)  rivaroxaban 20 mg oral tablet: 1 tab(s) orally once a day (before a meal) (14 Oct 2024 05:42)  sildenafil 20 mg oral tablet: 1 tab(s) orally every 8 hours (14 Oct 2024 05:42)  spironolactone 25 mg oral tablet: 2 tab(s) orally once a day (14 Oct 2024 05:42)    Allergies: Allergies    adhesives (Rash)  vancomycin (Rash)  penicillin (Rash)    Intolerances    REVIEW OF SYSTEMS  [x] A ten-point review of systems was otherwise negative except as noted.  [ ] Due to altered mental status/intubation, subjective information were not able to be obtained from the patient. History was obtained, to the extent possible, from review of the chart and collateral sources of information.    CURRENT MEDICATIONS:   Neurologic Medications  acetaminophen     Tablet .. 650 milliGRAM(s) Oral every 6 hours PRN Temp greater or equal to 38C (100.4F), Mild Pain (1 - 3)  acetaminophen 325 mG/butalbital 50 mG/caffeine 40 mG 1 Tablet(s) Oral every 8 hours PRN Mild Pain (1 - 3)  ibuprofen  Tablet. 400 milliGRAM(s) Oral every 8 hours PRN Moderate Pain (4 - 6), Severe Pain (7 - 10)  SUMAtriptan 50 milliGRAM(s) Oral every 12 hours PRN Moderate to Severe Headache    Respiratory Medications  buDESOnide    Inhalation Suspension 0.5 milliGRAM(s) Inhalation two times a day  ipratropium    for Nebulization 500 MICROGram(s) Nebulizer every 6 hours  montelukast 10 milliGRAM(s) Oral daily    Cardiovascular Medications  buMETAnide 1 milliGRAM(s) Oral daily  NIFEdipine XL 30 milliGRAM(s) Oral <User Schedule>  sildenafil (REVATIO) 20 milliGRAM(s) Oral <User Schedule>  spironolactone 50 milliGRAM(s) Oral two times a day    Gastrointestinal Medications  aluminum hydroxide/magnesium hydroxide/simethicone Suspension 30 milliLiter(s) Oral every 4 hours PRN Dyspepsia  folic acid 1 milliGRAM(s) Oral daily  pantoprazole    Tablet 40 milliGRAM(s) Oral before breakfast  potassium chloride    Tablet ER 40 milliEquivalent(s) Oral every 4 hours  sodium chloride 0.9%. 500 milliLiter(s) IV Continuous <Continuous>    Genitourinary Medications    Hematologic/Oncologic Medications  hepatitis B Vaccine - Adult (HEPLISAV-B) 20 MICROGram(s) IntraMuscular once  influenza   Vaccine 0.5 milliLiter(s) IntraMuscular once  rivaroxaban 20 milliGRAM(s) Oral with dinner    Antimicrobial/Immunologic Medications    Endocrine/Metabolic Medications  predniSONE   Tablet 5 milliGRAM(s) Oral daily    Topical/Other Medications  hydrocortisone 1% Ointment 1 Application(s) Topical two times a day PRN Itching  Treprostinil SubQ Continous Infusion 51 NANOGram(s)/kG/Min 0.056 mL/Hr IV Continuous <Continuous>      INS/OUTS (last 24 hours):  I&O's Summary    27 Oct 2024 07:01  -  28 Oct 2024 07:00  --------------------------------------------------------  IN: 1120 mL / OUT: 600 mL / NET: 520 mL    28 Oct 2024 07:01  -  29 Oct 2024 06:22  --------------------------------------------------------  IN: 1200 mL / OUT: 400 mL / NET: 800 mL      --------------------------------------------------------------------------------------  PHYSICAL EXAM:  GENERAL: NAD, resting comfortably in bed  HEAD:  Atraumatic, normocephalic  EYES: EOMI, PERRLA, conjunctiva and sclera clear  NECK: Supple  CHEST/LUNG: good inspiratory effort, intermittently using 2L O2  HEART: RRR. +S1/S2  ABDOMEN: Soft, nondistended  EXTREMITIES: No clubbing, cyanosis, or edema  PSYCH: A&O x3  NEUROLOGY: Non-focal, motor & sensory grossly intact  SKIN: Warm & dry, no rashes or lesions  LABS:                        13.7   10.53 )-----------( 379      ( 28 Oct 2024 09:39 )             44.9     10-28    133[L]  |  97  |  17  ----------------------------<  126[H]  3.9   |  20[L]  |  1.22    Ca    9.1      28 Oct 2024 09:39        Urinalysis Basic - ( 28 Oct 2024 09:39 )    Color: x / Appearance: x / SG: x / pH: x  Gluc: 126 mg/dL / Ketone: x  / Bili: x / Urobili: x   Blood: x / Protein: x / Nitrite: x   Leuk Esterase: x / RBC: x / WBC x   Sq Epi: x / Non Sq Epi: x / Bacteria: x        CAPILLARY BLOOD GLUCOSE          RADIOLOGY:

## 2024-10-29 NOTE — PROGRESS NOTE ADULT - PROBLEM SELECTOR PLAN 1
Admitted with RV failure  - Supplemental O2 as needed goal O2 sat > 93%  - Continue current doses of treprostinil, sildenafil, and CCBs  - Continue diuretics  - Continue prednisone 5mg daily  - Wegovy for weight loss after discharge - appreciate transplant pulm assistance arranging follow up

## 2024-10-29 NOTE — PROGRESS NOTE ADULT - SUBJECTIVE AND OBJECTIVE BOX
Leonard Hunter MD  Division of Hospital Medicine  Available on [employer]    PROGRESS NOTE:     Patient is a 44y old  Female who presents with a chief complaint of palpitation, worsening SOUZA and chest tightness (29 Oct 2024 06:21)      SUBJECTIVE / OVERNIGHT EVENTS: Patient seen and examined. She had some nausea and vomiting and chest pressure upon awakening this morning, vomited during her fluoroscopy. Vomit is clear.    MEDICATIONS  (STANDING):  buDESOnide    Inhalation Suspension 0.5 milliGRAM(s) Inhalation two times a day  buMETAnide 1 milliGRAM(s) Oral daily  folic acid 1 milliGRAM(s) Oral daily  hepatitis B Vaccine - Adult (HEPLISAV-B) 20 MICROGram(s) IntraMuscular once  influenza   Vaccine 0.5 milliLiter(s) IntraMuscular once  ipratropium    for Nebulization 500 MICROGram(s) Nebulizer every 6 hours  montelukast 10 milliGRAM(s) Oral daily  NIFEdipine XL 30 milliGRAM(s) Oral <User Schedule>  pantoprazole    Tablet 40 milliGRAM(s) Oral before breakfast  potassium chloride    Tablet ER 40 milliEquivalent(s) Oral every 4 hours  predniSONE   Tablet 5 milliGRAM(s) Oral daily  rivaroxaban 20 milliGRAM(s) Oral with dinner  sildenafil (REVATIO) 20 milliGRAM(s) Oral <User Schedule>  sodium chloride 0.9%. 500 milliLiter(s) (30 mL/Hr) IV Continuous <Continuous>  spironolactone 50 milliGRAM(s) Oral two times a day  Treprostinil SubQ Continous Infusion 51 NANOGram(s)/kG/Min 0.056 mL/Hr (0.06 mL/Hr) IV Continuous <Continuous>    MEDICATIONS  (PRN):  acetaminophen     Tablet .. 650 milliGRAM(s) Oral every 6 hours PRN Temp greater or equal to 38C (100.4F), Mild Pain (1 - 3)  acetaminophen 325 mG/butalbital 50 mG/caffeine 40 mG 1 Tablet(s) Oral every 8 hours PRN Mild Pain (1 - 3)  aluminum hydroxide/magnesium hydroxide/simethicone Suspension 30 milliLiter(s) Oral every 4 hours PRN Dyspepsia  hydrocortisone 1% Ointment 1 Application(s) Topical two times a day PRN Itching  ibuprofen  Tablet. 400 milliGRAM(s) Oral every 8 hours PRN Moderate Pain (4 - 6), Severe Pain (7 - 10)  SUMAtriptan 50 milliGRAM(s) Oral every 12 hours PRN Moderate to Severe Headache      CAPILLARY BLOOD GLUCOSE        I&O's Summary    28 Oct 2024 07:01  -  29 Oct 2024 07:00  --------------------------------------------------------  IN: 1200 mL / OUT: 400 mL / NET: 800 mL    29 Oct 2024 07:01  -  29 Oct 2024 15:10  --------------------------------------------------------  IN: 240 mL / OUT: 0 mL / NET: 240 mL        PHYSICAL EXAM:  Vital Signs Last 24 Hrs  T(C): 37 (29 Oct 2024 11:16), Max: 37.1 (29 Oct 2024 03:41)  T(F): 98.6 (29 Oct 2024 11:16), Max: 98.8 (29 Oct 2024 03:41)  HR: 92 (29 Oct 2024 11:16) (92 - 96)  BP: 139/98 (29 Oct 2024 11:16) (101/67 - 139/98)  BP(mean): 111 (29 Oct 2024 11:16) (87 - 111)  RR: 18 (29 Oct 2024 11:16) (18 - 18)  SpO2: 99% (29 Oct 2024 11:16) (95% - 99%)    Parameters below as of 29 Oct 2024 11:16  Patient On (Oxygen Delivery Method): nasal cannula    CONSTITUTIONAL: in distress with nausea  EYES: EOMI  ENMT: Moist oral mucosa  RESPIRATORY: Normal respiratory effort  CARDIOVASCULAR: rrr, no murmurs, no LE edema  ABDOMEN: Nontender to palpation, no rebound/guarding  MUSCULOSKELETAL:  no joint swelling or tenderness to palpation  PSYCH: A+O to person, place, and time; affect appropriate  NEUROLOGY: strength and sensation grossly intact  SKIN: No rashes; no palpable lesions    LABS:                        13.3   10.26 )-----------( 323      ( 29 Oct 2024 08:46 )             43.7     10-29    132[L]  |  99  |  16  ----------------------------<  91  4.5   |  19[L]  |  1.02    Ca    8.6      29 Oct 2024 08:46        CARDIAC MARKERS ( 29 Oct 2024 08:46 )  x     / x     / x     / x     / 1.6 ng/mL      Urinalysis Basic - ( 29 Oct 2024 08:46 )    Color: x / Appearance: x / SG: x / pH: x  Gluc: 91 mg/dL / Ketone: x  / Bili: x / Urobili: x   Blood: x / Protein: x / Nitrite: x   Leuk Esterase: x / RBC: x / WBC x   Sq Epi: x / Non Sq Epi: x / Bacteria: x          RADIOLOGY & ADDITIONAL TESTS:  Results Reviewed: Y  Imaging Personally Reviewed:Y  Electrocardiogram Personally Reviewed:Y    COORDINATION OF CARE:  Care Discussed with Consultants/Other Providers [Y/N]:Y  Prior or Outpatient Records Reviewed [Y/N]:Y

## 2024-10-30 ENCOUNTER — TRANSCRIPTION ENCOUNTER (OUTPATIENT)
Age: 44
End: 2024-10-30

## 2024-10-30 VITALS
SYSTOLIC BLOOD PRESSURE: 106 MMHG | HEART RATE: 85 BPM | DIASTOLIC BLOOD PRESSURE: 74 MMHG | RESPIRATION RATE: 18 BRPM | OXYGEN SATURATION: 95 %

## 2024-10-30 LAB — GALACTOMANNAN AG SERPL-ACNC: 0.06 INDEX — SIGNIFICANT CHANGE UP (ref 0–0.49)

## 2024-10-30 PROCEDURE — 86147 CARDIOLIPIN ANTIBODY EA IG: CPT

## 2024-10-30 PROCEDURE — 83516 IMMUNOASSAY NONANTIBODY: CPT

## 2024-10-30 PROCEDURE — 86255 FLUORESCENT ANTIBODY SCREEN: CPT

## 2024-10-30 PROCEDURE — 93930 UPPER EXTREMITY STUDY: CPT

## 2024-10-30 PROCEDURE — 86762 RUBELLA ANTIBODY: CPT

## 2024-10-30 PROCEDURE — 82728 ASSAY OF FERRITIN: CPT

## 2024-10-30 PROCEDURE — 86787 VARICELLA-ZOSTER ANTIBODY: CPT

## 2024-10-30 PROCEDURE — 87385 HISTOPLASMA CAPSUL AG IA: CPT

## 2024-10-30 PROCEDURE — 82977 ASSAY OF GGT: CPT

## 2024-10-30 PROCEDURE — 78598 LUNG PERF&VENTILAT DIFERENTL: CPT | Mod: MC

## 2024-10-30 PROCEDURE — 93925 LOWER EXTREMITY STUDY: CPT

## 2024-10-30 PROCEDURE — 84550 ASSAY OF BLOOD/URIC ACID: CPT

## 2024-10-30 PROCEDURE — A9539: CPT

## 2024-10-30 PROCEDURE — 81003 URINALYSIS AUTO W/O SCOPE: CPT

## 2024-10-30 PROCEDURE — 86780 TREPONEMA PALLIDUM: CPT

## 2024-10-30 PROCEDURE — 76937 US GUIDE VASCULAR ACCESS: CPT

## 2024-10-30 PROCEDURE — 84702 CHORIONIC GONADOTROPIN TEST: CPT

## 2024-10-30 PROCEDURE — 97530 THERAPEUTIC ACTIVITIES: CPT

## 2024-10-30 PROCEDURE — 85613 RUSSELL VIPER VENOM DILUTED: CPT

## 2024-10-30 PROCEDURE — 82784 ASSAY IGA/IGD/IGG/IGM EACH: CPT

## 2024-10-30 PROCEDURE — 70450 CT HEAD/BRAIN W/O DYE: CPT | Mod: MC

## 2024-10-30 PROCEDURE — 86644 CMV ANTIBODY: CPT

## 2024-10-30 PROCEDURE — 85303 CLOT INHIBIT PROT C ACTIVITY: CPT

## 2024-10-30 PROCEDURE — 74176 CT ABD & PELVIS W/O CONTRAST: CPT | Mod: MC

## 2024-10-30 PROCEDURE — 81240 F2 GENE: CPT

## 2024-10-30 PROCEDURE — 87522 HEPATITIS C REVRS TRNSCRPJ: CPT

## 2024-10-30 PROCEDURE — 0225U NFCT DS DNA&RNA 21 SARSCOV2: CPT

## 2024-10-30 PROCEDURE — 87040 BLOOD CULTURE FOR BACTERIA: CPT

## 2024-10-30 PROCEDURE — 85014 HEMATOCRIT: CPT

## 2024-10-30 PROCEDURE — 86038 ANTINUCLEAR ANTIBODIES: CPT

## 2024-10-30 PROCEDURE — 86753 PROTOZOA ANTIBODY NOS: CPT

## 2024-10-30 PROCEDURE — 86618 LYME DISEASE ANTIBODY: CPT

## 2024-10-30 PROCEDURE — 84443 ASSAY THYROID STIM HORMONE: CPT

## 2024-10-30 PROCEDURE — 71250 CT THORAX DX C-: CPT | Mod: MC

## 2024-10-30 PROCEDURE — 96374 THER/PROPH/DIAG INJ IV PUSH: CPT

## 2024-10-30 PROCEDURE — 85300 ANTITHROMBIN III ACTIVITY: CPT

## 2024-10-30 PROCEDURE — 97116 GAIT TRAINING THERAPY: CPT

## 2024-10-30 PROCEDURE — 87186 SC STD MICRODIL/AGAR DIL: CPT

## 2024-10-30 PROCEDURE — 80053 COMPREHEN METABOLIC PANEL: CPT

## 2024-10-30 PROCEDURE — 82330 ASSAY OF CALCIUM: CPT

## 2024-10-30 PROCEDURE — 86769 SARS-COV-2 COVID-19 ANTIBODY: CPT

## 2024-10-30 PROCEDURE — 84145 PROCALCITONIN (PCT): CPT

## 2024-10-30 PROCEDURE — 83605 ASSAY OF LACTIC ACID: CPT

## 2024-10-30 PROCEDURE — 85652 RBC SED RATE AUTOMATED: CPT

## 2024-10-30 PROCEDURE — 86664 EPSTEIN-BARR NUCLEAR ANTIGEN: CPT

## 2024-10-30 PROCEDURE — C1887: CPT

## 2024-10-30 PROCEDURE — 93880 EXTRACRANIAL BILAT STUDY: CPT

## 2024-10-30 PROCEDURE — 80307 DRUG TEST PRSMV CHEM ANLYZR: CPT

## 2024-10-30 PROCEDURE — 84134 ASSAY OF PREALBUMIN: CPT

## 2024-10-30 PROCEDURE — 93005 ELECTROCARDIOGRAM TRACING: CPT

## 2024-10-30 PROCEDURE — 93453 R&L HRT CATH W/VENTRICLGRPHY: CPT

## 2024-10-30 PROCEDURE — 83036 HEMOGLOBIN GLYCOSYLATED A1C: CPT

## 2024-10-30 PROCEDURE — 86696 HERPES SIMPLEX TYPE 2 TEST: CPT

## 2024-10-30 PROCEDURE — 80048 BASIC METABOLIC PNL TOTAL CA: CPT

## 2024-10-30 PROCEDURE — 83540 ASSAY OF IRON: CPT

## 2024-10-30 PROCEDURE — 80061 LIPID PANEL: CPT

## 2024-10-30 PROCEDURE — 99239 HOSP IP/OBS DSCHRG MGMT >30: CPT

## 2024-10-30 PROCEDURE — C1894: CPT

## 2024-10-30 PROCEDURE — 86480 TB TEST CELL IMMUN MEASURE: CPT

## 2024-10-30 PROCEDURE — 86225 DNA ANTIBODY NATIVE: CPT

## 2024-10-30 PROCEDURE — 97162 PT EVAL MOD COMPLEX 30 MIN: CPT

## 2024-10-30 PROCEDURE — 74177 CT ABD & PELVIS W/CONTRAST: CPT | Mod: MC

## 2024-10-30 PROCEDURE — 86790 VIRUS ANTIBODY NOS: CPT

## 2024-10-30 PROCEDURE — 84132 ASSAY OF SERUM POTASSIUM: CPT

## 2024-10-30 PROCEDURE — 93463 DRUG ADMIN & HEMODYNMIC MEAS: CPT | Mod: 59

## 2024-10-30 PROCEDURE — 82803 BLOOD GASES ANY COMBINATION: CPT

## 2024-10-30 PROCEDURE — 82553 CREATINE MB FRACTION: CPT

## 2024-10-30 PROCEDURE — 86645 CMV ANTIBODY IGM: CPT

## 2024-10-30 PROCEDURE — 83521 IG LIGHT CHAINS FREE EACH: CPT

## 2024-10-30 PROCEDURE — 36415 COLL VENOUS BLD VENIPUNCTURE: CPT

## 2024-10-30 PROCEDURE — 85307 ASSAY ACTIVATED PROTEIN C: CPT

## 2024-10-30 PROCEDURE — 82085 ASSAY OF ALDOLASE: CPT

## 2024-10-30 PROCEDURE — 86704 HEP B CORE ANTIBODY TOTAL: CPT

## 2024-10-30 PROCEDURE — 85730 THROMBOPLASTIN TIME PARTIAL: CPT

## 2024-10-30 PROCEDURE — 86765 RUBEOLA ANTIBODY: CPT

## 2024-10-30 PROCEDURE — 93923 UPR/LXTR ART STDY 3+ LVLS: CPT

## 2024-10-30 PROCEDURE — 86706 HEP B SURFACE ANTIBODY: CPT

## 2024-10-30 PROCEDURE — 87340 HEPATITIS B SURFACE AG IA: CPT

## 2024-10-30 PROCEDURE — 86735 MUMPS ANTIBODY: CPT

## 2024-10-30 PROCEDURE — 87305 ASPERGILLUS AG IA: CPT

## 2024-10-30 PROCEDURE — 85018 HEMOGLOBIN: CPT

## 2024-10-30 PROCEDURE — 87640 STAPH A DNA AMP PROBE: CPT

## 2024-10-30 PROCEDURE — 84295 ASSAY OF SERUM SODIUM: CPT

## 2024-10-30 PROCEDURE — 86747 PARVOVIRUS ANTIBODY: CPT

## 2024-10-30 PROCEDURE — 87478 BORRELIA MIYAMOTOI AMP PRB: CPT

## 2024-10-30 PROCEDURE — 76830 TRANSVAGINAL US NON-OB: CPT

## 2024-10-30 PROCEDURE — 86140 C-REACTIVE PROTEIN: CPT

## 2024-10-30 PROCEDURE — 86850 RBC ANTIBODY SCREEN: CPT

## 2024-10-30 PROCEDURE — 84480 ASSAY TRIIODOTHYRONINE (T3): CPT

## 2024-10-30 PROCEDURE — 87389 HIV-1 AG W/HIV-1&-2 AB AG IA: CPT

## 2024-10-30 PROCEDURE — 86695 HERPES SIMPLEX TYPE 1 TEST: CPT

## 2024-10-30 PROCEDURE — 78472 GATED HEART PLANAR SINGLE: CPT | Mod: MC

## 2024-10-30 PROCEDURE — 86431 RHEUMATOID FACTOR QUANT: CPT

## 2024-10-30 PROCEDURE — 93306 TTE W/DOPPLER COMPLETE: CPT

## 2024-10-30 PROCEDURE — 82746 ASSAY OF FOLIC ACID SERUM: CPT

## 2024-10-30 PROCEDURE — 74018 RADEX ABDOMEN 1 VIEW: CPT

## 2024-10-30 PROCEDURE — 86635 COCCIDIOIDES ANTIBODY: CPT

## 2024-10-30 PROCEDURE — 86663 EPSTEIN-BARR ANTIBODY: CPT

## 2024-10-30 PROCEDURE — 71046 X-RAY EXAM CHEST 2 VIEWS: CPT

## 2024-10-30 PROCEDURE — 99285 EMERGENCY DEPT VISIT HI MDM: CPT | Mod: 25

## 2024-10-30 PROCEDURE — 93970 EXTREMITY STUDY: CPT

## 2024-10-30 PROCEDURE — 86803 HEPATITIS C AB TEST: CPT

## 2024-10-30 PROCEDURE — 71275 CT ANGIOGRAPHY CHEST: CPT | Mod: MC

## 2024-10-30 PROCEDURE — 82575 CREATININE CLEARANCE TEST: CPT

## 2024-10-30 PROCEDURE — 82435 ASSAY OF BLOOD CHLORIDE: CPT

## 2024-10-30 PROCEDURE — 84436 ASSAY OF TOTAL THYROXINE: CPT

## 2024-10-30 PROCEDURE — 86160 COMPLEMENT ANTIGEN: CPT

## 2024-10-30 PROCEDURE — 82306 VITAMIN D 25 HYDROXY: CPT

## 2024-10-30 PROCEDURE — 85610 PROTHROMBIN TIME: CPT

## 2024-10-30 PROCEDURE — A9560: CPT

## 2024-10-30 PROCEDURE — 87637 SARSCOV2&INF A&B&RSV AMP PRB: CPT

## 2024-10-30 PROCEDURE — 86235 NUCLEAR ANTIGEN ANTIBODY: CPT

## 2024-10-30 PROCEDURE — 87077 CULTURE AEROBIC IDENTIFY: CPT

## 2024-10-30 PROCEDURE — 83090 ASSAY OF HOMOCYSTEINE: CPT

## 2024-10-30 PROCEDURE — 86146 BETA-2 GLYCOPROTEIN ANTIBODY: CPT

## 2024-10-30 PROCEDURE — 83550 IRON BINDING TEST: CPT

## 2024-10-30 PROCEDURE — 86709 HEPATITIS A IGM ANTIBODY: CPT

## 2024-10-30 PROCEDURE — 85306 CLOT INHIBIT PROT S FREE: CPT

## 2024-10-30 PROCEDURE — 86900 BLOOD TYPING SEROLOGIC ABO: CPT

## 2024-10-30 PROCEDURE — 86777 TOXOPLASMA ANTIBODY: CPT

## 2024-10-30 PROCEDURE — 86901 BLOOD TYPING SEROLOGIC RH(D): CPT

## 2024-10-30 PROCEDURE — 83690 ASSAY OF LIPASE: CPT

## 2024-10-30 PROCEDURE — C1769: CPT

## 2024-10-30 PROCEDURE — 86036 ANCA SCREEN EACH ANTIBODY: CPT

## 2024-10-30 PROCEDURE — 93308 TTE F-UP OR LMTD: CPT

## 2024-10-30 PROCEDURE — 82607 VITAMIN B-12: CPT

## 2024-10-30 PROCEDURE — 76000 FLUOROSCOPY <1 HR PHYS/QHP: CPT

## 2024-10-30 PROCEDURE — 87449 NOS EACH ORGANISM AG IA: CPT

## 2024-10-30 PROCEDURE — 81241 F5 GENE: CPT

## 2024-10-30 PROCEDURE — 87536 HIV-1 QUANT&REVRSE TRNSCRPJ: CPT

## 2024-10-30 PROCEDURE — 87086 URINE CULTURE/COLONY COUNT: CPT

## 2024-10-30 PROCEDURE — 83880 ASSAY OF NATRIURETIC PEPTIDE: CPT

## 2024-10-30 PROCEDURE — 74221 X-RAY XM ESOPHAGUS 2CNTRST: CPT

## 2024-10-30 PROCEDURE — 83735 ASSAY OF MAGNESIUM: CPT

## 2024-10-30 PROCEDURE — 82652 VIT D 1 25-DIHYDROXY: CPT

## 2024-10-30 PROCEDURE — C1889: CPT

## 2024-10-30 PROCEDURE — 86665 EPSTEIN-BARR CAPSID VCA: CPT

## 2024-10-30 PROCEDURE — G0480: CPT

## 2024-10-30 PROCEDURE — 85025 COMPLETE CBC W/AUTO DIFF WBC: CPT

## 2024-10-30 PROCEDURE — 82550 ASSAY OF CK (CPK): CPT

## 2024-10-30 PROCEDURE — 84484 ASSAY OF TROPONIN QUANT: CPT

## 2024-10-30 PROCEDURE — C8924: CPT

## 2024-10-30 PROCEDURE — 87641 MR-STAPH DNA AMP PROBE: CPT

## 2024-10-30 PROCEDURE — 94010 BREATHING CAPACITY TEST: CPT

## 2024-10-30 PROCEDURE — 85027 COMPLETE CBC AUTOMATED: CPT

## 2024-10-30 PROCEDURE — 86708 HEPATITIS A ANTIBODY: CPT

## 2024-10-30 PROCEDURE — 86403 PARTICLE AGGLUT ANTBDY SCRN: CPT

## 2024-10-30 PROCEDURE — 86682 HELMINTH ANTIBODY: CPT

## 2024-10-30 PROCEDURE — 84100 ASSAY OF PHOSPHORUS: CPT

## 2024-10-30 PROCEDURE — A9540: CPT

## 2024-10-30 PROCEDURE — 94640 AIRWAY INHALATION TREATMENT: CPT

## 2024-10-30 PROCEDURE — 82962 GLUCOSE BLOOD TEST: CPT

## 2024-10-30 PROCEDURE — 82150 ASSAY OF AMYLASE: CPT

## 2024-10-30 PROCEDURE — 82947 ASSAY GLUCOSE BLOOD QUANT: CPT

## 2024-10-30 PROCEDURE — 82248 BILIRUBIN DIRECT: CPT

## 2024-10-30 RX ORDER — NIFEDIPINE 90 MG
1 TABLET, EXTENDED RELEASE 24 HR ORAL
Qty: 30 | Refills: 0
Start: 2024-10-30 | End: 2024-11-28

## 2024-10-30 RX ORDER — SUMATRIPTAN SUCCINATE 6 MG/.5ML
1 INJECTION, SOLUTION SUBCUTANEOUS
Qty: 10 | Refills: 0
Start: 2024-10-30 | End: 2024-11-03

## 2024-10-30 RX ORDER — POLYETHYLENE GLYCOL 3350 17 G/17G
17 POWDER, FOR SOLUTION ORAL
Qty: 510 | Refills: 0
Start: 2024-10-30 | End: 2024-11-28

## 2024-10-30 RX ORDER — MONTELUKAST SODIUM 10 MG/1
1 TABLET, FILM COATED ORAL
Qty: 30 | Refills: 0
Start: 2024-10-30 | End: 2024-11-28

## 2024-10-30 RX ORDER — SPIRONOLACTONE 100 MG
2 TABLET ORAL
Qty: 120 | Refills: 0
Start: 2024-10-30 | End: 2024-11-28

## 2024-10-30 RX ORDER — ACETAMINOPHEN 500 MG
2 TABLET ORAL
Qty: 0 | Refills: 0 | DISCHARGE
Start: 2024-10-30

## 2024-10-30 RX ORDER — SENNA 187 MG
2 TABLET ORAL
Qty: 60 | Refills: 0
Start: 2024-10-30 | End: 2024-11-28

## 2024-10-30 RX ORDER — BUDESONIDE 3 MG/1
2 CAPSULE ORAL
Qty: 60 | Refills: 0
Start: 2024-10-30 | End: 2024-11-28

## 2024-10-30 RX ORDER — FOLIC ACID 1 MG/1
1 TABLET ORAL
Qty: 30 | Refills: 0
Start: 2024-10-30 | End: 2024-11-28

## 2024-10-30 RX ADMIN — IPRATROPIUM BROMIDE 500 MICROGRAM(S): 0.5 SOLUTION RESPIRATORY (INHALATION) at 06:42

## 2024-10-30 RX ADMIN — Medication 50 MILLIGRAM(S): at 06:41

## 2024-10-30 RX ADMIN — Medication 5 MILLIGRAM(S): at 06:41

## 2024-10-30 RX ADMIN — IPRATROPIUM BROMIDE 500 MICROGRAM(S): 0.5 SOLUTION RESPIRATORY (INHALATION) at 12:13

## 2024-10-30 RX ADMIN — Medication 1 MILLIGRAM(S): at 06:42

## 2024-10-30 RX ADMIN — Medication 30 MILLIGRAM(S): at 12:13

## 2024-10-30 RX ADMIN — POLYETHYLENE GLYCOL 3350 17 GRAM(S): 17 POWDER, FOR SOLUTION ORAL at 12:13

## 2024-10-30 RX ADMIN — Medication 20 MILLIGRAM(S): at 15:23

## 2024-10-30 RX ADMIN — Medication 20 MILLIGRAM(S): at 08:43

## 2024-10-30 RX ADMIN — PANTOPRAZOLE SODIUM 40 MILLIGRAM(S): 40 TABLET, DELAYED RELEASE ORAL at 06:41

## 2024-10-30 RX ADMIN — FOLIC ACID 1 MILLIGRAM(S): 1 TABLET ORAL at 12:13

## 2024-10-30 RX ADMIN — MONTELUKAST SODIUM 10 MILLIGRAM(S): 10 TABLET, FILM COATED ORAL at 12:12

## 2024-10-30 NOTE — DISCHARGE NOTE NURSING/CASE MANAGEMENT/SOCIAL WORK - NSDCPEFALRISK_GEN_ALL_CORE
For information on Fall & Injury Prevention, visit: [email or web address]/[email or web address] OR  [email or web address]/[email or web address] OR  https://www.cdc.gov/steadi/patient.html

## 2024-10-30 NOTE — DISCHARGE NOTE NURSING/CASE MANAGEMENT/SOCIAL WORK - FINANCIAL ASSISTANCE
[facility] provides services at a reduced cost to those who are determined to be eligible through [facility]’s financial assistance program. Information regarding [facility]’s financial assistance program can be found by going to [email or web address] or by calling [phone].

## 2024-10-30 NOTE — DISCHARGE NOTE NURSING/CASE MANAGEMENT/SOCIAL WORK - PATIENT PORTAL LINK FT
You can access the FollowMyHealth Patient Portal offered by [facility] by registering at the following website: [email or web address]. By joining [facility]’s FollowMyHealth portal, you will also be able to view your health information using other applications (apps) compatible with our system.

## 2024-11-02 LAB
H CAPSUL AG SPEC-ACNC: SIGNIFICANT CHANGE UP
H CAPSUL AG UR QL IA: SIGNIFICANT CHANGE UP

## 2024-11-06 DIAGNOSIS — Z01.818 ENCOUNTER FOR OTHER PREPROCEDURAL EXAMINATION: ICD-10-CM

## 2024-11-06 NOTE — CONSULT NOTE ADULT - CONSULT REQUESTED BY NAME
11/6/2024    Dear Dr Recio,     Patient: Cecilia Sparks   YOB: 1961        This patient is waiting to have a Colonoscopy and/or Esophagogastroduodenoscopy which I will perform at [facility] on 12/13/24 .     Our records indicate this patient is currently taking plavix. This procedure requires the patient to suspend their anticoagulant medication prior to surgery.     Please respond to this request noting your recommendations. You may contact our office at [phone] Option 3 with any questions. I appreciate your prompt response in this matter.     Please return this form to our office no later than two weeks prior to the procedure date listed above. Please return form to [phone]. Please inform our office if the patient requires additional follow-up from your office prior to scheduled procedure date.     ____ I approve my patient to stop taking their Anticoagulant Therapy medication 5 days prior to the scheduled procedure.    ____ I do NOT approve my patient to stop taking their Anticoagulant Therapy medication at this time.      Please specify clearance expiration date:_____________________________    Approving physician name (please print):     _____________________________________________    Approving physician signature:     ________________________________    Date:________________        Sincerely,  [facility]   Gastroenterology -      Medicine

## 2024-11-08 ENCOUNTER — NON-APPOINTMENT (OUTPATIENT)
Age: 44
End: 2024-11-08

## 2024-11-08 ENCOUNTER — APPOINTMENT (OUTPATIENT)
Dept: PULMONOLOGY | Facility: CLINIC | Age: 44
End: 2024-11-08
Payer: MEDICAID

## 2024-11-08 VITALS
BODY MASS INDEX: 36.49 KG/M2 | SYSTOLIC BLOOD PRESSURE: 116 MMHG | RESPIRATION RATE: 16 BRPM | DIASTOLIC BLOOD PRESSURE: 80 MMHG | TEMPERATURE: 98.1 F | HEART RATE: 102 BPM | WEIGHT: 219 LBS | HEIGHT: 65 IN | OXYGEN SATURATION: 96 %

## 2024-11-08 DIAGNOSIS — E66.812 OBESITY, CLASS 2: ICD-10-CM

## 2024-11-08 DIAGNOSIS — E66.01 OBESITY, CLASS 2: ICD-10-CM

## 2024-11-08 PROCEDURE — 99214 OFFICE O/P EST MOD 30 MIN: CPT

## 2024-11-08 PROCEDURE — ZZZZZ: CPT

## 2024-11-08 RX ORDER — SEMAGLUTIDE 0.25 MG/.5ML
0.25 INJECTION, SOLUTION SUBCUTANEOUS
Qty: 1 | Refills: 3 | Status: ACTIVE | COMMUNITY
Start: 2024-11-08 | End: 1900-01-01

## 2024-11-15 LAB
HGB FLD-MCNC: 14.7 G/DL — SIGNIFICANT CHANGE UP (ref 11.7–16.5)
OXYHGB MFR BLDMV: 60.3 % — LOW (ref 90–95)
SAO2 % BLD: 61.8 % — SIGNIFICANT CHANGE UP (ref 60–90)

## 2024-11-19 ENCOUNTER — NON-APPOINTMENT (OUTPATIENT)
Age: 44
End: 2024-11-19

## 2024-11-19 DIAGNOSIS — J45.909 UNSPECIFIED ASTHMA, UNCOMPLICATED: ICD-10-CM

## 2024-11-19 LAB — ENA JO1 AB SER IA-ACNC: SIGNIFICANT CHANGE UP

## 2024-11-19 RX ORDER — AZITHROMYCIN 250 MG/1
250 TABLET, FILM COATED ORAL
Qty: 1 | Refills: 0 | Status: ACTIVE | COMMUNITY
Start: 2024-11-19 | End: 1900-01-01

## 2024-11-21 ENCOUNTER — NON-APPOINTMENT (OUTPATIENT)
Age: 44
End: 2024-11-21

## 2024-11-21 NOTE — PROGRESS NOTE ADULT - PROBLEM SELECTOR PLAN 4
Blood Pressure Checks    11/19/2024  /79    Pulse 84  /58    Pulse 77    11/20/2024  /69    Pulse 94  /78    Pulse 105    11/21/2024  /76    Pulse 101  /83    Pulse 87   Pt has chronic pulmonary embolism, CT-Angio showed distal pulmonary arteries are diminished in size.  - continue xarelto overnight 10mg Daily

## 2024-11-22 ENCOUNTER — APPOINTMENT (OUTPATIENT)
Dept: PULMONOLOGY | Facility: CLINIC | Age: 44
End: 2024-11-22

## 2024-11-25 ENCOUNTER — APPOINTMENT (OUTPATIENT)
Dept: PULMONOLOGY | Facility: CLINIC | Age: 44
End: 2024-11-25

## 2024-12-06 ENCOUNTER — APPOINTMENT (OUTPATIENT)
Dept: PULMONOLOGY | Facility: CLINIC | Age: 44
End: 2024-12-06
Payer: MEDICAID

## 2024-12-06 VITALS
HEIGHT: 65 IN | SYSTOLIC BLOOD PRESSURE: 131 MMHG | TEMPERATURE: 97.5 F | BODY MASS INDEX: 37.15 KG/M2 | OXYGEN SATURATION: 91 % | RESPIRATION RATE: 22 BRPM | HEART RATE: 117 BPM | WEIGHT: 223 LBS | DIASTOLIC BLOOD PRESSURE: 92 MMHG

## 2024-12-06 PROCEDURE — 99215 OFFICE O/P EST HI 40 MIN: CPT

## 2024-12-06 RX ORDER — NIFEDIPINE 30 MG/1
30 TABLET, EXTENDED RELEASE ORAL
Qty: 30 | Refills: 2 | Status: ACTIVE | COMMUNITY
Start: 2024-12-06 | End: 1900-01-01

## 2024-12-10 DIAGNOSIS — Z01.818 ENCOUNTER FOR OTHER PREPROCEDURAL EXAMINATION: ICD-10-CM

## 2024-12-13 ENCOUNTER — APPOINTMENT (OUTPATIENT)
Dept: PULMONOLOGY | Facility: CLINIC | Age: 44
End: 2024-12-13
Payer: MEDICAID

## 2024-12-13 ENCOUNTER — LABORATORY RESULT (OUTPATIENT)
Age: 44
End: 2024-12-13

## 2024-12-13 VITALS
RESPIRATION RATE: 15 BRPM | BODY MASS INDEX: 36.65 KG/M2 | SYSTOLIC BLOOD PRESSURE: 93 MMHG | HEART RATE: 130 BPM | WEIGHT: 220 LBS | DIASTOLIC BLOOD PRESSURE: 70 MMHG | HEIGHT: 65 IN | TEMPERATURE: 97 F | OXYGEN SATURATION: 90 %

## 2024-12-13 DIAGNOSIS — I27.20 PULMONARY HYPERTENSION, UNSPECIFIED: ICD-10-CM

## 2024-12-13 DIAGNOSIS — R51.9 HEADACHE, UNSPECIFIED: ICD-10-CM

## 2024-12-13 DIAGNOSIS — J84.9 INTERSTITIAL PULMONARY DISEASE, UNSPECIFIED: ICD-10-CM

## 2024-12-13 PROCEDURE — 99214 OFFICE O/P EST MOD 30 MIN: CPT

## 2024-12-13 RX ORDER — ACETAMINOPHEN AND CODEINE PHOSPHATE 300; 15 MG/1; MG/1
300-15 TABLET ORAL DAILY
Qty: 7 | Refills: 0 | Status: ACTIVE | COMMUNITY
Start: 2024-12-13 | End: 1900-01-01

## 2024-12-16 LAB
ALBUMIN SERPL ELPH-MCNC: 3.9 G/DL
ALP BLD-CCNC: 72 U/L
ALT SERPL-CCNC: 8 U/L
ANION GAP SERPL CALC-SCNC: 11 MMOL/L
AST SERPL-CCNC: 9 U/L
BASOPHILS # BLD AUTO: 0.06 K/UL
BASOPHILS NFR BLD AUTO: 0.6 %
BILIRUB SERPL-MCNC: 0.8 MG/DL
BUN SERPL-MCNC: 7 MG/DL
CALCIUM SERPL-MCNC: 9.2 MG/DL
CHLORIDE SERPL-SCNC: 105 MMOL/L
CO2 SERPL-SCNC: 23 MMOL/L
CREAT SERPL-MCNC: 0.92 MG/DL
EGFR: 79 ML/MIN/1.73M2
EOSINOPHIL # BLD AUTO: 0.24 K/UL
EOSINOPHIL NFR BLD AUTO: 2.5 %
GLUCOSE SERPL-MCNC: 86 MG/DL
HCT VFR BLD CALC: 42 %
HGB BLD-MCNC: 13.1 G/DL
IMM GRANULOCYTES NFR BLD AUTO: 0.2 %
LYMPHOCYTES # BLD AUTO: 2.03 K/UL
LYMPHOCYTES NFR BLD AUTO: 20.8 %
MAN DIFF?: NORMAL
MCHC RBC-ENTMCNC: 21.7 PG
MCHC RBC-ENTMCNC: 31.2 G/DL
MCV RBC AUTO: 69.4 FL
MONOCYTES # BLD AUTO: 0.95 K/UL
MONOCYTES NFR BLD AUTO: 9.8 %
NEUTROPHILS # BLD AUTO: 6.44 K/UL
NEUTROPHILS NFR BLD AUTO: 66.1 %
NT-PROBNP SERPL-MCNC: 110 PG/ML
PLATELET # BLD AUTO: 361 K/UL
POTASSIUM SERPL-SCNC: 3.6 MMOL/L
PROT SERPL-MCNC: 6.9 G/DL
RBC # BLD: 6.05 M/UL
RBC # FLD: 19.8 %
SODIUM SERPL-SCNC: 139 MMOL/L
WBC # FLD AUTO: 9.74 K/UL

## 2024-12-17 ENCOUNTER — APPOINTMENT (OUTPATIENT)
Dept: ELECTROPHYSIOLOGY | Facility: CLINIC | Age: 44
End: 2024-12-17
Payer: MEDICAID

## 2024-12-17 ENCOUNTER — NON-APPOINTMENT (OUTPATIENT)
Age: 44
End: 2024-12-17

## 2024-12-17 PROCEDURE — 93296 REM INTERROG EVL PM/IDS: CPT

## 2024-12-17 PROCEDURE — 93294 REM INTERROG EVL PM/LDLS PM: CPT

## 2024-12-20 ENCOUNTER — APPOINTMENT (OUTPATIENT)
Dept: RADIOLOGY | Facility: IMAGING CENTER | Age: 44
End: 2024-12-20
Payer: MEDICAID

## 2024-12-20 ENCOUNTER — RESULT REVIEW (OUTPATIENT)
Age: 44
End: 2024-12-20

## 2024-12-20 ENCOUNTER — OUTPATIENT (OUTPATIENT)
Dept: OUTPATIENT SERVICES | Facility: HOSPITAL | Age: 44
LOS: 1 days | End: 2024-12-20
Payer: MEDICAID

## 2024-12-20 ENCOUNTER — APPOINTMENT (OUTPATIENT)
Dept: MAMMOGRAPHY | Facility: IMAGING CENTER | Age: 44
End: 2024-12-20
Payer: MEDICAID

## 2024-12-20 DIAGNOSIS — Z98.51 TUBAL LIGATION STATUS: Chronic | ICD-10-CM

## 2024-12-20 DIAGNOSIS — Z76.82 AWAITING ORGAN TRANSPLANT STATUS: ICD-10-CM

## 2024-12-20 DIAGNOSIS — Z95.0 PRESENCE OF CARDIAC PACEMAKER: Chronic | ICD-10-CM

## 2024-12-20 DIAGNOSIS — J84.9 INTERSTITIAL PULMONARY DISEASE, UNSPECIFIED: ICD-10-CM

## 2024-12-20 PROCEDURE — 77080 DXA BONE DENSITY AXIAL: CPT

## 2024-12-20 PROCEDURE — 77067 SCR MAMMO BI INCL CAD: CPT | Mod: 26

## 2024-12-20 PROCEDURE — 77063 BREAST TOMOSYNTHESIS BI: CPT

## 2024-12-20 PROCEDURE — 77063 BREAST TOMOSYNTHESIS BI: CPT | Mod: 26

## 2024-12-20 PROCEDURE — 77080 DXA BONE DENSITY AXIAL: CPT | Mod: 26

## 2024-12-20 PROCEDURE — 77067 SCR MAMMO BI INCL CAD: CPT

## 2024-12-31 ENCOUNTER — APPOINTMENT (OUTPATIENT)
Dept: INTERNAL MEDICINE | Facility: CLINIC | Age: 44
End: 2024-12-31

## 2025-01-02 ENCOUNTER — APPOINTMENT (OUTPATIENT)
Dept: PULMONOLOGY | Facility: CLINIC | Age: 45
End: 2025-01-02

## 2025-01-03 NOTE — BH CONSULTATION LIAISON PROGRESS NOTE - NSBHADMITIPOBS_PSY_A_CORE
"----- Message from Karen sent at 1/3/2025 12:24 PM CST -----  Regarding: NP PHYSICAL THERAPY IAN Gatica Morning/Afternoon,    [facility]  received your order to get the attached patient scheduled for an evaluation.     The patient has refused to the evaluation stating they do not wish to pay the copay amount. Patient declined offer of Financial Assistance information.    We wanted you to be aware that your patient has not started therapy.     Please have the patient call us at [phone] should they decide to schedule for therapy.    Best regards,  Karen "Ms. Lamas" [place]  Access Navigator  [phone] FAX: [phone]  [email or web address]  "
Routine observation
Routine observation

## 2025-01-08 ENCOUNTER — NON-APPOINTMENT (OUTPATIENT)
Age: 45
End: 2025-01-08

## 2025-01-08 ENCOUNTER — APPOINTMENT (OUTPATIENT)
Dept: HEART FAILURE | Facility: CLINIC | Age: 45
End: 2025-01-08

## 2025-01-08 NOTE — DIETITIAN INITIAL EVALUATION ADULT - WEIGHT USED FOR CALCULATIONS
CARDIAC REHAB NOTE  Patient Name: Lamin Duong  Patient : 1962      Patient attended cardiac rehab exercise session today and tolerated well with no complaints. Reports taking all medication and denies chest pain at entry. Further documentation will be scanned into the medical record upon discharge.    Patient has option to wear mask or not and maintained social distancing while exercising. Due to COVID-19 exercise blood pressures were not obtained. All COVID-19 guidelines were followed.   
IBW

## 2025-01-10 ENCOUNTER — APPOINTMENT (OUTPATIENT)
Dept: PULMONOLOGY | Facility: CLINIC | Age: 45
End: 2025-01-10

## 2025-01-10 VITALS
WEIGHT: 215 LBS | DIASTOLIC BLOOD PRESSURE: 72 MMHG | SYSTOLIC BLOOD PRESSURE: 99 MMHG | TEMPERATURE: 98.4 F | OXYGEN SATURATION: 93 % | HEIGHT: 65 IN | RESPIRATION RATE: 15 BRPM | HEART RATE: 105 BPM | BODY MASS INDEX: 35.82 KG/M2

## 2025-01-10 PROCEDURE — 99214 OFFICE O/P EST MOD 30 MIN: CPT

## 2025-01-13 RX ORDER — SEMAGLUTIDE 0.68 MG/ML
2 INJECTION, SOLUTION SUBCUTANEOUS
Qty: 1 | Refills: 0 | Status: ACTIVE | COMMUNITY
Start: 2025-01-10 | End: 1900-01-01

## 2025-01-22 DIAGNOSIS — I50.810 RIGHT HEART FAILURE, UNSPECIFIED: ICD-10-CM

## 2025-01-22 DIAGNOSIS — Z01.818 ENCOUNTER FOR OTHER PREPROCEDURAL EXAMINATION: ICD-10-CM

## 2025-01-27 ENCOUNTER — APPOINTMENT (OUTPATIENT)
Dept: PULMONOLOGY | Facility: CLINIC | Age: 45
End: 2025-01-27

## 2025-01-27 ENCOUNTER — NON-APPOINTMENT (OUTPATIENT)
Age: 45
End: 2025-01-27

## 2025-01-30 RX ORDER — AZITHROMYCIN 250 MG/1
250 TABLET, FILM COATED ORAL
Qty: 1 | Refills: 0 | Status: ACTIVE | COMMUNITY
Start: 2025-01-30 | End: 1900-01-01

## 2025-01-30 RX ORDER — PREDNISONE 10 MG/1
10 TABLET ORAL DAILY
Qty: 7 | Refills: 1 | Status: ACTIVE | COMMUNITY
Start: 2025-01-30 | End: 1900-01-01

## 2025-01-31 ENCOUNTER — APPOINTMENT (OUTPATIENT)
Dept: PULMONOLOGY | Facility: CLINIC | Age: 45
End: 2025-01-31
Payer: MEDICAID

## 2025-01-31 DIAGNOSIS — J84.9 INTERSTITIAL PULMONARY DISEASE, UNSPECIFIED: ICD-10-CM

## 2025-01-31 DIAGNOSIS — I27.20 PULMONARY HYPERTENSION, UNSPECIFIED: ICD-10-CM

## 2025-01-31 DIAGNOSIS — R06.09 OTHER FORMS OF DYSPNEA: ICD-10-CM

## 2025-01-31 PROCEDURE — 94726 PLETHYSMOGRAPHY LUNG VOLUMES: CPT

## 2025-01-31 PROCEDURE — ZZZZZ: CPT

## 2025-01-31 PROCEDURE — 36600 WITHDRAWAL OF ARTERIAL BLOOD: CPT | Mod: 59

## 2025-01-31 PROCEDURE — 94729 DIFFUSING CAPACITY: CPT

## 2025-01-31 PROCEDURE — 82803 BLOOD GASES ANY COMBINATION: CPT

## 2025-01-31 PROCEDURE — 94010 BREATHING CAPACITY TEST: CPT

## 2025-01-31 PROCEDURE — 99214 OFFICE O/P EST MOD 30 MIN: CPT | Mod: 25

## 2025-01-31 RX ORDER — AZELASTINE HYDROCHLORIDE 137 UG/1
137 SPRAY, METERED NASAL TWICE DAILY
Qty: 1 | Refills: 4 | Status: ACTIVE | COMMUNITY
Start: 2025-01-31 | End: 1900-01-01

## 2025-02-05 ENCOUNTER — OUTPATIENT (OUTPATIENT)
Dept: OUTPATIENT SERVICES | Facility: HOSPITAL | Age: 45
LOS: 1 days | End: 2025-02-05
Payer: MEDICAID

## 2025-02-05 ENCOUNTER — TRANSCRIPTION ENCOUNTER (OUTPATIENT)
Age: 45
End: 2025-02-05

## 2025-02-05 VITALS
HEART RATE: 107 BPM | WEIGHT: 214.07 LBS | HEIGHT: 65 IN | SYSTOLIC BLOOD PRESSURE: 121 MMHG | OXYGEN SATURATION: 97 % | RESPIRATION RATE: 18 BRPM | TEMPERATURE: 98 F | DIASTOLIC BLOOD PRESSURE: 73 MMHG

## 2025-02-05 VITALS
OXYGEN SATURATION: 100 % | SYSTOLIC BLOOD PRESSURE: 98 MMHG | RESPIRATION RATE: 16 BRPM | HEART RATE: 90 BPM | DIASTOLIC BLOOD PRESSURE: 57 MMHG

## 2025-02-05 DIAGNOSIS — J84.9 INTERSTITIAL PULMONARY DISEASE, UNSPECIFIED: ICD-10-CM

## 2025-02-05 DIAGNOSIS — Z95.0 PRESENCE OF CARDIAC PACEMAKER: Chronic | ICD-10-CM

## 2025-02-05 DIAGNOSIS — Z98.51 TUBAL LIGATION STATUS: Chronic | ICD-10-CM

## 2025-02-05 LAB
ANION GAP SERPL CALC-SCNC: 14 MMOL/L — SIGNIFICANT CHANGE UP (ref 5–17)
BUN SERPL-MCNC: 6 MG/DL — LOW (ref 7–23)
CALCIUM SERPL-MCNC: 8.5 MG/DL — SIGNIFICANT CHANGE UP (ref 8.4–10.5)
CHLORIDE SERPL-SCNC: 104 MMOL/L — SIGNIFICANT CHANGE UP (ref 96–108)
CO2 SERPL-SCNC: 18 MMOL/L — LOW (ref 22–31)
CREAT SERPL-MCNC: 0.85 MG/DL — SIGNIFICANT CHANGE UP (ref 0.5–1.3)
EGFR: 87 ML/MIN/1.73M2 — SIGNIFICANT CHANGE UP
GLUCOSE SERPL-MCNC: 74 MG/DL — SIGNIFICANT CHANGE UP (ref 70–99)
HCG SERPL-ACNC: <2 MIU/ML — SIGNIFICANT CHANGE UP
HCT VFR BLD CALC: 40.8 % — SIGNIFICANT CHANGE UP (ref 34.5–45)
HGB BLD-MCNC: 12.3 G/DL — SIGNIFICANT CHANGE UP (ref 11.5–15.5)
HGB BLDA-MCNC: 11.9 G/DL — SIGNIFICANT CHANGE UP (ref 11.7–16.1)
HGB FLD-MCNC: 12.1 G/DL — SIGNIFICANT CHANGE UP (ref 11.7–16.5)
MCHC RBC-ENTMCNC: 21.2 PG — LOW (ref 27–34)
MCHC RBC-ENTMCNC: 30.1 G/DL — LOW (ref 32–36)
MCV RBC AUTO: 70.2 FL — LOW (ref 80–100)
NRBC # BLD: 0 /100 WBCS — SIGNIFICANT CHANGE UP (ref 0–0)
NRBC BLD-RTO: 0 /100 WBCS — SIGNIFICANT CHANGE UP (ref 0–0)
OXYHGB MFR BLDA: 96.2 % — HIGH (ref 90–95)
OXYHGB MFR BLDMV: 73.5 % — LOW (ref 90–95)
PLATELET # BLD AUTO: 302 K/UL — SIGNIFICANT CHANGE UP (ref 150–400)
POTASSIUM SERPL-MCNC: 4.5 MMOL/L — SIGNIFICANT CHANGE UP (ref 3.5–5.3)
POTASSIUM SERPL-SCNC: 4.5 MMOL/L — SIGNIFICANT CHANGE UP (ref 3.5–5.3)
RBC # BLD: 5.81 M/UL — HIGH (ref 3.8–5.2)
RBC # FLD: 18.6 % — HIGH (ref 10.3–14.5)
SAO2 % BLD: 74.7 % — SIGNIFICANT CHANGE UP (ref 60–90)
SAO2 % BLDA: 97.8 % — SIGNIFICANT CHANGE UP (ref 94–98)
SODIUM SERPL-SCNC: 136 MMOL/L — SIGNIFICANT CHANGE UP (ref 135–145)
WBC # BLD: 8.96 K/UL — SIGNIFICANT CHANGE UP (ref 3.8–10.5)
WBC # FLD AUTO: 8.96 K/UL — SIGNIFICANT CHANGE UP (ref 3.8–10.5)

## 2025-02-05 PROCEDURE — C1894: CPT

## 2025-02-05 PROCEDURE — 82803 BLOOD GASES ANY COMBINATION: CPT

## 2025-02-05 PROCEDURE — 85027 COMPLETE CBC AUTOMATED: CPT

## 2025-02-05 PROCEDURE — 99152 MOD SED SAME PHYS/QHP 5/>YRS: CPT | Mod: 59

## 2025-02-05 PROCEDURE — C1887: CPT

## 2025-02-05 PROCEDURE — 84702 CHORIONIC GONADOTROPIN TEST: CPT

## 2025-02-05 PROCEDURE — 93456 R HRT CORONARY ARTERY ANGIO: CPT

## 2025-02-05 PROCEDURE — C1769: CPT

## 2025-02-05 PROCEDURE — 93456 R HRT CORONARY ARTERY ANGIO: CPT | Mod: 26,59

## 2025-02-05 PROCEDURE — 93010 ELECTROCARDIOGRAM REPORT: CPT

## 2025-02-05 PROCEDURE — 80048 BASIC METABOLIC PNL TOTAL CA: CPT

## 2025-02-05 PROCEDURE — 93005 ELECTROCARDIOGRAM TRACING: CPT

## 2025-02-05 RX ORDER — ACETAMINOPHEN 160 MG/5ML
1000 SUSPENSION ORAL ONCE
Refills: 0 | Status: COMPLETED | OUTPATIENT
Start: 2025-02-05 | End: 2025-02-05

## 2025-02-05 RX ORDER — RIVAROXABAN 20 MG/1
1 TABLET, FILM COATED ORAL
Qty: 0 | Refills: 0 | DISCHARGE

## 2025-02-05 RX ADMIN — ACETAMINOPHEN 400 MILLIGRAM(S): 160 SUSPENSION ORAL at 13:13

## 2025-02-05 RX ADMIN — ACETAMINOPHEN 1000 MILLIGRAM(S): 160 SUSPENSION ORAL at 13:52

## 2025-02-05 NOTE — H&P CARDIOLOGY - NSICDXPASTSURGICALHX_GEN_ALL_CORE_FT
PAST SURGICAL HISTORY:  H/O tubal ligation     History of pacemaker 12/19 - [place] [employer] model Ingevity 4469    History of tubal ligation     Pacemaker

## 2025-02-05 NOTE — ASU DISCHARGE PLAN (ADULT/PEDIATRIC) - CARE PROVIDER_API CALL
Elpidio Lou  Pulmonary Disease  [address] [place] [address], [address]  [place] [address]  Phone: [phone]  Fax: [phone]  Established Patient  Follow Up Time: 2 weeks

## 2025-02-05 NOTE — H&P CARDIOLOGY - NSICDXPASTMEDICALHX_GEN_ALL_CORE_FT
PAST MEDICAL HISTORY:  Anemia     Asthma On home O2 nightly at 2L via NC    Asthma     CAD (coronary artery disease)     Corneal disorder     H/O pulmonary hypertension     History of tachycardia     On supplemental oxygen by nasal cannula O2 @ 2L    Pacemaker     PE (pulmonary thromboembolism) on Xarelto 10 mg daily    Pulmonary embolism     Pulmonary embolism     Pulmonary hypertension     Right atrial thrombus     Right heart failure     Sinusitis     Skin mass left upper thigh mass    Tachycardia

## 2025-02-05 NOTE — ASU DISCHARGE PLAN (ADULT/PEDIATRIC) - ASU DC SPECIAL INSTRUCTIONSFT

## 2025-02-05 NOTE — ASU DISCHARGE PLAN (ADULT/PEDIATRIC) - PROVIDER TOKENS
PROVIDER:[TOKEN:[[id number]:[facility]:[id number]],FOLLOWUP:[2 weeks],ESTABLISHEDPATIENT:[T]]

## 2025-02-05 NOTE — ASU DISCHARGE PLAN (ADULT/PEDIATRIC) - FINANCIAL ASSISTANCE
[facility] provides services at a reduced cost to those who are determined to be eligible through [facility]’s financial assistance program. Information regarding [facility]’s financial assistance program can be found by going to [email or web address] or by calling [phone].

## 2025-02-05 NOTE — ASU DISCHARGE PLAN (ADULT/PEDIATRIC) - DIET/FLUID RESTRICTION
I reviewed the H&P, I examined the patient, and there are no changes in the patient's condition.  
No change

## 2025-02-05 NOTE — ASU DISCHARGE PLAN (ADULT/PEDIATRIC) - NS MD DC FALL RISK RISK
For information on Fall & Injury Prevention, visit: [email or web address]-[email or web address]-mobility OR  [email or web address]/[email or web address] OR  https://www.cdc.gov/steadi/patient.html

## 2025-02-05 NOTE — H&P CARDIOLOGY - HISTORY OF PRESENT ILLNESS
44 yr old female PMH pulmonary HTN on O2@2L and Remodulin via infusion pump and currently being evaluated for lung transplant under the care of Dr. Kale Tolentino, asthma, HF, s/p pacemaker placed at [place] [facility] [employer] MRI EL , chronic PE (dx 2017) on Xarelto - last dose 2/3/25, right atrial thrombectomy 2022,  iron deficiency anemia, referred for R/LHC for pre-transplant expedited work-up. 44 yr old female PMH pulmonary HTN on O2@2L and Remodulin via infusion pump (continuous - may not be stopped) and currently being evaluated for lung transplant under the care of Dr. Kale Tolentino, asthma, HF, [facility] [place] [facility] [employer] [facility] EL , chronic PE (dx 2017) on Xarelto - last dose 2/3/25, right atrial thrombectomy 2022,  iron deficiency anemia, referred for R/LHC for pre-transplant expedited work-up.

## 2025-02-05 NOTE — H&P CARDIOLOGY - RS GEN PE MLT RESP DETAILS PC
airway patent/respirations non-labored/no rales/no rhonchi/no wheezes/diminished breath sounds, L/diminished breath sounds, R

## 2025-02-07 ENCOUNTER — APPOINTMENT (OUTPATIENT)
Dept: PULMONOLOGY | Facility: CLINIC | Age: 45
End: 2025-02-07

## 2025-02-08 ENCOUNTER — LABORATORY RESULT (OUTPATIENT)
Age: 45
End: 2025-02-08

## 2025-02-08 LAB — HCG SERPL-MCNC: <1 MIU/ML

## 2025-02-10 ENCOUNTER — NON-APPOINTMENT (OUTPATIENT)
Age: 45
End: 2025-02-10

## 2025-02-10 DIAGNOSIS — R51.9 HEADACHE, UNSPECIFIED: ICD-10-CM

## 2025-02-11 ENCOUNTER — OUTPATIENT (OUTPATIENT)
Dept: OUTPATIENT SERVICES | Facility: HOSPITAL | Age: 45
LOS: 1 days | End: 2025-02-11

## 2025-02-11 ENCOUNTER — APPOINTMENT (OUTPATIENT)
Dept: INTERNAL MEDICINE | Facility: CLINIC | Age: 45
End: 2025-02-11

## 2025-02-11 VITALS — HEIGHT: 65 IN | BODY MASS INDEX: 35.86 KG/M2 | WEIGHT: 215.25 LBS

## 2025-02-11 VITALS
OXYGEN SATURATION: 94 % | HEIGHT: 65 IN | SYSTOLIC BLOOD PRESSURE: 110 MMHG | BODY MASS INDEX: 35.86 KG/M2 | HEART RATE: 110 BPM | WEIGHT: 215.25 LBS | DIASTOLIC BLOOD PRESSURE: 74 MMHG

## 2025-02-11 VITALS
HEIGHT: 65 IN | WEIGHT: 215.25 LBS | DIASTOLIC BLOOD PRESSURE: 74 MMHG | OXYGEN SATURATION: 94 % | SYSTOLIC BLOOD PRESSURE: 110 MMHG | BODY MASS INDEX: 35.86 KG/M2 | HEART RATE: 110 BPM

## 2025-02-11 DIAGNOSIS — Z98.51 TUBAL LIGATION STATUS: Chronic | ICD-10-CM

## 2025-02-11 DIAGNOSIS — R73.03 PREDIABETES.: ICD-10-CM

## 2025-02-11 DIAGNOSIS — E66.01 OBESITY, CLASS 2: ICD-10-CM

## 2025-02-11 DIAGNOSIS — I10 ESSENTIAL (PRIMARY) HYPERTENSION: ICD-10-CM

## 2025-02-11 DIAGNOSIS — E66.812 OBESITY, CLASS 2: ICD-10-CM

## 2025-02-11 DIAGNOSIS — Z95.0 PRESENCE OF CARDIAC PACEMAKER: Chronic | ICD-10-CM

## 2025-02-11 PROBLEM — I51.3 INTRACARDIAC THROMBOSIS, NOT ELSEWHERE CLASSIFIED: Chronic | Status: ACTIVE | Noted: 2025-02-05

## 2025-02-11 PROCEDURE — 99203 OFFICE O/P NEW LOW 30 MIN: CPT | Mod: GC

## 2025-02-11 PROCEDURE — G2211 COMPLEX E/M VISIT ADD ON: CPT | Mod: NC

## 2025-02-11 RX ORDER — SEMAGLUTIDE 1.34 MG/ML
4 INJECTION, SOLUTION SUBCUTANEOUS
Qty: 3 | Refills: 0 | Status: ACTIVE | COMMUNITY
Start: 2025-02-11 | End: 1900-01-01

## 2025-02-13 ENCOUNTER — APPOINTMENT (OUTPATIENT)
Dept: INFECTIOUS DISEASE | Facility: CLINIC | Age: 45
End: 2025-02-13

## 2025-02-13 ENCOUNTER — NON-APPOINTMENT (OUTPATIENT)
Age: 45
End: 2025-02-13

## 2025-02-14 ENCOUNTER — NON-APPOINTMENT (OUTPATIENT)
Age: 45
End: 2025-02-14

## 2025-02-15 ENCOUNTER — EMERGENCY (EMERGENCY)
Facility: HOSPITAL | Age: 45
LOS: 1 days | Discharge: HOME | End: 2025-02-15
Attending: STUDENT IN AN ORGANIZED HEALTH CARE EDUCATION/TRAINING PROGRAM
Payer: MEDICAID

## 2025-02-15 VITALS
OXYGEN SATURATION: 96 % | TEMPERATURE: 98 F | DIASTOLIC BLOOD PRESSURE: 54 MMHG | HEART RATE: 99 BPM | SYSTOLIC BLOOD PRESSURE: 98 MMHG | RESPIRATION RATE: 18 BRPM

## 2025-02-15 VITALS
TEMPERATURE: 99 F | RESPIRATION RATE: 17 BRPM | SYSTOLIC BLOOD PRESSURE: 109 MMHG | DIASTOLIC BLOOD PRESSURE: 72 MMHG | HEART RATE: 114 BPM | OXYGEN SATURATION: 94 % | HEIGHT: 65 IN | WEIGHT: 214.95 LBS

## 2025-02-15 DIAGNOSIS — Z95.0 PRESENCE OF CARDIAC PACEMAKER: Chronic | ICD-10-CM

## 2025-02-15 DIAGNOSIS — Z98.51 TUBAL LIGATION STATUS: Chronic | ICD-10-CM

## 2025-02-15 LAB
ALBUMIN SERPL ELPH-MCNC: 3.8 G/DL — SIGNIFICANT CHANGE UP (ref 3.3–5)
ALP SERPL-CCNC: 63 U/L — SIGNIFICANT CHANGE UP (ref 40–120)
ALT FLD-CCNC: 7 U/L — LOW (ref 10–45)
ANION GAP SERPL CALC-SCNC: 11 MMOL/L — SIGNIFICANT CHANGE UP (ref 5–17)
ANISOCYTOSIS BLD QL: SLIGHT — SIGNIFICANT CHANGE UP
AST SERPL-CCNC: 8 U/L — LOW (ref 10–40)
BASOPHILS # BLD AUTO: 0 K/UL — SIGNIFICANT CHANGE UP (ref 0–0.2)
BASOPHILS NFR BLD AUTO: 0 % — SIGNIFICANT CHANGE UP (ref 0–2)
BILIRUB SERPL-MCNC: 0.7 MG/DL — SIGNIFICANT CHANGE UP (ref 0.2–1.2)
BUN SERPL-MCNC: 4 MG/DL — LOW (ref 7–23)
CALCIUM SERPL-MCNC: 8.7 MG/DL — SIGNIFICANT CHANGE UP (ref 8.4–10.5)
CHLORIDE SERPL-SCNC: 104 MMOL/L — SIGNIFICANT CHANGE UP (ref 96–108)
CO2 SERPL-SCNC: 21 MMOL/L — LOW (ref 22–31)
CREAT SERPL-MCNC: 0.94 MG/DL — SIGNIFICANT CHANGE UP (ref 0.5–1.3)
EGFR: 77 ML/MIN/1.73M2 — SIGNIFICANT CHANGE UP
EOSINOPHIL # BLD AUTO: 0.13 K/UL — SIGNIFICANT CHANGE UP (ref 0–0.5)
EOSINOPHIL NFR BLD AUTO: 0.9 % — SIGNIFICANT CHANGE UP (ref 0–6)
GLUCOSE SERPL-MCNC: 89 MG/DL — SIGNIFICANT CHANGE UP (ref 70–99)
HCT VFR BLD CALC: 38.8 % — SIGNIFICANT CHANGE UP (ref 34.5–45)
HGB BLD-MCNC: 12 G/DL — SIGNIFICANT CHANGE UP (ref 11.5–15.5)
HYPOCHROMIA BLD QL: SLIGHT — SIGNIFICANT CHANGE UP
LYMPHOCYTES # BLD AUTO: 0.85 K/UL — LOW (ref 1–3.3)
LYMPHOCYTES # BLD AUTO: 6 % — LOW (ref 13–44)
MANUAL SMEAR VERIFICATION: SIGNIFICANT CHANGE UP
MCHC RBC-ENTMCNC: 22 PG — LOW (ref 27–34)
MCHC RBC-ENTMCNC: 30.9 G/DL — LOW (ref 32–36)
MCV RBC AUTO: 71.2 FL — LOW (ref 80–100)
MICROCYTES BLD QL: SIGNIFICANT CHANGE UP
MONOCYTES # BLD AUTO: 0.61 K/UL — SIGNIFICANT CHANGE UP (ref 0–0.9)
MONOCYTES NFR BLD AUTO: 4.3 % — SIGNIFICANT CHANGE UP (ref 2–14)
NEUTROPHILS # BLD AUTO: 12.56 K/UL — HIGH (ref 1.8–7.4)
NEUTROPHILS NFR BLD AUTO: 88.8 % — HIGH (ref 43–77)
OVALOCYTES BLD QL SMEAR: SLIGHT — SIGNIFICANT CHANGE UP
PLAT MORPH BLD: NORMAL — SIGNIFICANT CHANGE UP
PLATELET # BLD AUTO: 341 K/UL — SIGNIFICANT CHANGE UP (ref 150–400)
POIKILOCYTOSIS BLD QL AUTO: SLIGHT — SIGNIFICANT CHANGE UP
POTASSIUM SERPL-MCNC: 3.4 MMOL/L — LOW (ref 3.5–5.3)
POTASSIUM SERPL-SCNC: 3.4 MMOL/L — LOW (ref 3.5–5.3)
PROT SERPL-MCNC: 7.1 G/DL — SIGNIFICANT CHANGE UP (ref 6–8.3)
RBC # BLD: 5.45 M/UL — HIGH (ref 3.8–5.2)
RBC # FLD: 18.3 % — HIGH (ref 10.3–14.5)
RBC BLD AUTO: ABNORMAL
SODIUM SERPL-SCNC: 136 MMOL/L — SIGNIFICANT CHANGE UP (ref 135–145)
WBC # BLD: 14.14 K/UL — HIGH (ref 3.8–10.5)
WBC # FLD AUTO: 14.14 K/UL — HIGH (ref 3.8–10.5)

## 2025-02-15 PROCEDURE — 99284 EMERGENCY DEPT VISIT MOD MDM: CPT | Mod: 25

## 2025-02-15 PROCEDURE — 96376 TX/PRO/DX INJ SAME DRUG ADON: CPT | Mod: XU

## 2025-02-15 PROCEDURE — 99285 EMERGENCY DEPT VISIT HI MDM: CPT | Mod: 25

## 2025-02-15 PROCEDURE — 87077 CULTURE AEROBIC IDENTIFY: CPT

## 2025-02-15 PROCEDURE — 80053 COMPREHEN METABOLIC PANEL: CPT

## 2025-02-15 PROCEDURE — 10060 I&D ABSCESS SIMPLE/SINGLE: CPT

## 2025-02-15 PROCEDURE — 87040 BLOOD CULTURE FOR BACTERIA: CPT

## 2025-02-15 PROCEDURE — 96374 THER/PROPH/DIAG INJ IV PUSH: CPT | Mod: XU

## 2025-02-15 PROCEDURE — 10061 I&D ABSCESS COMP/MULTIPLE: CPT

## 2025-02-15 PROCEDURE — 96375 TX/PRO/DX INJ NEW DRUG ADDON: CPT | Mod: XU

## 2025-02-15 PROCEDURE — 87070 CULTURE OTHR SPECIMN AEROBIC: CPT

## 2025-02-15 PROCEDURE — 87186 SC STD MICRODIL/AGAR DIL: CPT

## 2025-02-15 PROCEDURE — 36410 VNPNXR 3YR/> PHY/QHP DX/THER: CPT | Mod: 59

## 2025-02-15 PROCEDURE — 85025 COMPLETE CBC W/AUTO DIFF WBC: CPT

## 2025-02-15 RX ORDER — DOXYCYCLINE HYCLATE 100 MG/1
1 CAPSULE ORAL
Qty: 20 | Refills: 0
Start: 2025-02-15 | End: 2025-02-24

## 2025-02-15 RX ORDER — LIDOCAINE HYDROCHLORIDE 10 MG/ML
10 INJECTION EPIDURAL; INFILTRATION; INTRACAUDAL ONCE
Refills: 0 | Status: DISCONTINUED | OUTPATIENT
Start: 2025-02-15 | End: 2025-02-19

## 2025-02-15 RX ORDER — OXYCODONE HYDROCHLORIDE 30 MG/1
1 TABLET ORAL
Qty: 6 | Refills: 0
Start: 2025-02-15 | End: 2025-02-16

## 2025-02-15 RX ORDER — HYDROMORPHONE HYDROCHLORIDE 4 MG/ML
0.5 INJECTION, SOLUTION INTRAMUSCULAR; INTRAVENOUS; SUBCUTANEOUS ONCE
Refills: 0 | Status: DISCONTINUED | OUTPATIENT
Start: 2025-02-15 | End: 2025-02-15

## 2025-02-15 RX ORDER — POTASSIUM CHLORIDE 750 MG/1
10 TABLET, EXTENDED RELEASE ORAL DAILY
Refills: 0 | Status: DISCONTINUED | OUTPATIENT
Start: 2025-02-15 | End: 2025-02-19

## 2025-02-15 RX ORDER — DOXYCYCLINE HYCLATE 100 MG/1
100 CAPSULE ORAL EVERY 12 HOURS
Refills: 0 | Status: DISCONTINUED | OUTPATIENT
Start: 2025-02-15 | End: 2025-02-19

## 2025-02-15 RX ORDER — KETOROLAC TROMETHAMINE 10 MG
15 TABLET ORAL ONCE
Refills: 0 | Status: DISCONTINUED | OUTPATIENT
Start: 2025-02-15 | End: 2025-02-15

## 2025-02-15 RX ORDER — HYDROMORPHONE HYDROCHLORIDE 4 MG/ML
1 INJECTION, SOLUTION INTRAMUSCULAR; INTRAVENOUS; SUBCUTANEOUS ONCE
Refills: 0 | Status: DISCONTINUED | OUTPATIENT
Start: 2025-02-15 | End: 2025-02-15

## 2025-02-15 RX ORDER — LIDOCAINE HYDROCHLORIDE 30 MG/G
1 CREAM TOPICAL ONCE
Refills: 0 | Status: DISCONTINUED | OUTPATIENT
Start: 2025-02-15 | End: 2025-02-19

## 2025-02-15 RX ADMIN — DOXYCYCLINE HYCLATE 100 MILLIGRAM(S): 100 CAPSULE ORAL at 19:15

## 2025-02-15 RX ADMIN — Medication 15 MILLIGRAM(S): at 18:12

## 2025-02-15 RX ADMIN — HYDROMORPHONE HYDROCHLORIDE 1 MILLIGRAM(S): 4 INJECTION, SOLUTION INTRAMUSCULAR; INTRAVENOUS; SUBCUTANEOUS at 19:15

## 2025-02-15 RX ADMIN — POTASSIUM CHLORIDE 10 MILLIEQUIVALENT(S): 750 TABLET, EXTENDED RELEASE ORAL at 18:12

## 2025-02-15 RX ADMIN — HYDROMORPHONE HYDROCHLORIDE 0.5 MILLIGRAM(S): 4 INJECTION, SOLUTION INTRAMUSCULAR; INTRAVENOUS; SUBCUTANEOUS at 17:08

## 2025-02-15 NOTE — ED PROVIDER NOTE - PROGRESS NOTE DETAILS
Attending Chelsy Lawrence MD: performed I&D, tolerated well   repleted potassium and started on doxycyline  provided strict return precautions

## 2025-02-15 NOTE — ED PROVIDER NOTE - NSFOLLOWUPINSTRUCTIONS_ED_ALL_ED_FT
You have undergone an incision and drainage procedure for an abscess and have been prescribed doxycycline. These instructions will help you care for the wound and take your medication properly.    Wound Care:    Keep the wound clean and dry: Gently wash the area with mild soap and water, typically once or twice a day, or as directed by your doctor. Pat it dry with a clean towel. Avoid scrubbing or using harsh soaps.  Dressings: Your healthcare provider may have placed a dressing on the wound. Follow their instructions regarding dressing changes. If you have a packing in the wound, do not remove it unless specifically instructed by your doctor.  Soaking or Bathing: You may be instructed to soak the wound in warm water or take sitz baths several times a day, especially if the abscess was in a sensitive area. Follow your doctor's specific instructions.  Avoid touching the wound: Refrain from touching the wound unnecessarily to prevent re-infection.  Observe for signs of infection: Watch for increased pain, swelling, redness, warmth, pus, or fever. Contact your doctor immediately if you experience any of these.  Medication:    Doxycycline: Take this medication exactly as prescribed by your doctor. Finish the entire course of antibiotics, even if you start feeling better. Do not skip doses.  Dosage: Take the prescribed dosage of doxycycline with a full glass of water. Ask your doctor if you can take it with food, as some forms may cause stomach upset.  Side Effects: Common side effects include nausea, vomiting, diarrhea, and sensitivity to sunlight. If these are severe, contact your doctor. Rare but serious side effects can also occur. Inform your doctor of any unusual symptoms.  Other Medications: Inform your doctor about all other medications you are taking, including over-the-counter medications, supplements, and herbal remedies.  Pain Management:    Over-the-counter pain relievers: You can take over-the-counter pain medications like acetaminophen (Tylenol) or ibuprofen (Advil, Motrin) to manage pain. Follow the dosage instructions on the label.  General Instructions:    Rest: Get plenty of rest to aid healing.  Hygiene: Wash your hands thoroughly, especially after touching the wound or changing dressings.  Follow-up: Attend all scheduled follow-up appointments with your doctor.  When to Seek Medical Attention:    Increased pain, swelling, redness, or warmth around the wound  Pus or foul-smelling drainage from the wound  Fever of 100.4°F (38°C) or higher  Red streaks extending from the wound  Nausea or vomiting  Difficulty urinating  Dizziness or lightheadedness  Disclaimer: This information is for educational purposes only and should not be considered medical advice. Always follow your doctor's instructions and consult with them if you have any questions or concerns.

## 2025-02-15 NOTE — ED PROVIDER NOTE - NSICDXPASTSURGICALHX_GEN_ALL_CORE_FT
PAST SURGICAL HISTORY:  H/O tubal ligation     History of pacemaker 12/19 - [employer] model Ingevity 4469    History of tubal ligation     Pacemaker

## 2025-02-15 NOTE — ED PROVIDER NOTE - CLINICAL SUMMARY MEDICAL DECISION MAKING FREE TEXT BOX
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  Patient reports symptoms began on Monday with worsening pain.  She reports she took 1 Tylenol with codeine for minimal alleviation in her symptoms.  Denies fever, chills, nausea or vomiting.  She reports prior episodes that require I&D in the past.    PE: well appearing, nontoxic, no respiratory distress.  2.5x2.5cm left lower abdominal wall mass with fluctuance and overlying warmth, no drainage present. Neuro nonfocal.  Skin intact. Psych normal mood.    MDM: Differential diagnosis includes but is not limited to abscess, cyst   Will assess with labs and perform I&D

## 2025-02-15 NOTE — ED ADULT TRIAGE NOTE - CHIEF COMPLAINT QUOTE
pt reports "cyst on stomach" since Monday, pt reports h/o of them and has gotten them drained  h/o pulmonary HTN on 2L NC daily

## 2025-02-15 NOTE — ED ADULT NURSE NOTE - NSICDXPASTSURGICALHX_GEN_ALL_CORE_FT
PAST SURGICAL HISTORY:  H/O tubal ligation     History of pacemaker 12/19 - [employer] model Ingevity 4469    History of tubal ligation     Pacemaker

## 2025-02-15 NOTE — ED PROVIDER NOTE - PATIENT PORTAL LINK FT
You can access the FollowMyHealth Patient Portal offered by [facility] by registering at the following website: [email or web address]. By joining [employer]’s FollowMyHealth portal, you will also be able to view your health information using other applications (apps) compatible with our system.

## 2025-02-15 NOTE — ED ADULT NURSE NOTE - OBJECTIVE STATEMENT
Pt presents to ED from home complaining of L abdominal swelling and pain since Monday that has gotten progressively worse. Pt denies chest pain, chest palpitations, n/v/d, fever/chills. PMH of Pulmonary Hypertension on 2L NC at baseline, R-sided HF, prior DVT and PE on Xarelto.

## 2025-02-15 NOTE — ED ADULT NURSE NOTE - CAS TRG GEN SKIN CONDITION
Warm/Dry Hydroquinone Counseling:  Patient advised that medication may result in skin irritation, lightening (hypopigmentation), dryness, and burning.  In the event of skin irritation, the patient was advised to reduce the amount of the drug applied or use it less frequently.  Rarely, spots that are treated with hydroquinone can become darker (pseudoochronosis).  Should this occur, patient instructed to stop medication and call the office. The patient verbalized understanding of the proper use and possible adverse effects of hydroquinone.  All of the patient's questions and concerns were addressed.

## 2025-02-15 NOTE — ED PROCEDURE NOTE - PROCEDURE NAME, MLM
Point of Care Ultrasound Musculoskeletal/Soft Tissue
Point of Care Ultrasound Vascular Access
Incision & Drainage

## 2025-02-17 LAB
-  CLINDAMYCIN: SIGNIFICANT CHANGE UP
-  DAPTOMYCIN: SIGNIFICANT CHANGE UP
-  ERYTHROMYCIN: SIGNIFICANT CHANGE UP
-  GENTAMICIN: SIGNIFICANT CHANGE UP
-  LINEZOLID: SIGNIFICANT CHANGE UP
-  OXACILLIN: SIGNIFICANT CHANGE UP
-  PENICILLIN: SIGNIFICANT CHANGE UP
-  RIFAMPIN: SIGNIFICANT CHANGE UP
-  TETRACYCLINE: SIGNIFICANT CHANGE UP
-  TRIMETHOPRIM/SULFAMETHOXAZOLE: SIGNIFICANT CHANGE UP
-  VANCOMYCIN: SIGNIFICANT CHANGE UP
METHOD TYPE: SIGNIFICANT CHANGE UP

## 2025-02-18 RX ORDER — PREDNISONE 10 MG/1
10 TABLET ORAL
Qty: 60 | Refills: 0 | Status: ACTIVE | COMMUNITY
Start: 2025-02-18 | End: 1900-01-01

## 2025-02-19 RX ORDER — SULFAMETHOXAZOLE AND TRIMETHOPRIM 800; 160 MG/1; MG/1
800-160 TABLET ORAL
Qty: 20 | Refills: 1 | Status: ACTIVE | COMMUNITY
Start: 2025-02-19 | End: 1900-01-01

## 2025-02-19 RX ORDER — BENRALIZUMAB 30 MG/ML
30 INJECTION, SOLUTION SUBCUTANEOUS
Qty: 1 | Refills: 2 | Status: DISCONTINUED | COMMUNITY
Start: 2025-02-19 | End: 2025-02-19

## 2025-02-20 RX ORDER — BENRALIZUMAB 30 MG/ML
30 INJECTION, SOLUTION SUBCUTANEOUS
Qty: 1 | Refills: 2 | Status: ACTIVE | COMMUNITY
Start: 2025-02-19 | End: 1900-01-01

## 2025-02-20 RX ORDER — BENRALIZUMAB 30 MG/ML
30 INJECTION, SOLUTION SUBCUTANEOUS
Qty: 1 | Refills: 5 | Status: ACTIVE | COMMUNITY
Start: 2025-02-19 | End: 1900-01-01

## 2025-02-21 ENCOUNTER — APPOINTMENT (OUTPATIENT)
Dept: CARDIOTHORACIC SURGERY | Facility: CLINIC | Age: 45
End: 2025-02-21
Payer: MEDICAID

## 2025-02-21 VITALS
OXYGEN SATURATION: 97 % | TEMPERATURE: 98.1 F | SYSTOLIC BLOOD PRESSURE: 125 MMHG | BODY MASS INDEX: 35.82 KG/M2 | DIASTOLIC BLOOD PRESSURE: 80 MMHG | HEIGHT: 65 IN | HEART RATE: 92 BPM | RESPIRATION RATE: 15 BRPM | WEIGHT: 215 LBS

## 2025-02-21 DIAGNOSIS — J84.9 INTERSTITIAL PULMONARY DISEASE, UNSPECIFIED: ICD-10-CM

## 2025-02-21 DIAGNOSIS — Z01.818 ENCOUNTER FOR OTHER PREPROCEDURAL EXAMINATION: ICD-10-CM

## 2025-02-21 LAB
CULTURE RESULTS: SIGNIFICANT CHANGE UP
SPECIMEN SOURCE: SIGNIFICANT CHANGE UP

## 2025-02-21 PROCEDURE — 99205 OFFICE O/P NEW HI 60 MIN: CPT

## 2025-02-22 ENCOUNTER — NON-APPOINTMENT (OUTPATIENT)
Age: 45
End: 2025-02-22

## 2025-02-24 ENCOUNTER — APPOINTMENT (OUTPATIENT)
Dept: PULMONOLOGY | Facility: CLINIC | Age: 45
End: 2025-02-24

## 2025-02-26 ENCOUNTER — NON-APPOINTMENT (OUTPATIENT)
Age: 45
End: 2025-02-26

## 2025-02-28 ENCOUNTER — APPOINTMENT (OUTPATIENT)
Dept: OBGYN | Facility: CLINIC | Age: 45
End: 2025-02-28
Payer: MEDICAID

## 2025-02-28 VITALS
DIASTOLIC BLOOD PRESSURE: 66 MMHG | HEIGHT: 65 IN | BODY MASS INDEX: 35.32 KG/M2 | SYSTOLIC BLOOD PRESSURE: 98 MMHG | WEIGHT: 212 LBS

## 2025-02-28 DIAGNOSIS — Z01.419 ENCOUNTER FOR GYNECOLOGICAL EXAMINATION (GENERAL) (ROUTINE) W/OUT ABNORMAL FINDINGS: ICD-10-CM

## 2025-02-28 PROCEDURE — 99386 PREV VISIT NEW AGE 40-64: CPT

## 2025-02-28 PROCEDURE — 99459 PELVIC EXAMINATION: CPT

## 2025-03-02 LAB
CANDIDA VAG CYTO: NOT DETECTED
G VAGINALIS+PREV SP MTYP VAG QL MICRO: NOT DETECTED
PROLACTIN SERPL-MCNC: 15.5 NG/ML
T VAGINALIS VAG QL WET PREP: NOT DETECTED

## 2025-03-03 LAB
ALBUMIN SERPL ELPH-MCNC: 4.5 G/DL
ALP BLD-CCNC: 77 U/L
ALT SERPL-CCNC: 14 U/L
ANION GAP SERPL CALC-SCNC: 16 MMOL/L
ANTI-MUELLERIAN HORMONE: 0.03 NG/ML
AST SERPL-CCNC: 15 U/L
BILIRUB SERPL-MCNC: 0.6 MG/DL
BUN SERPL-MCNC: 7 MG/DL
C TRACH RRNA SPEC QL NAA+PROBE: NOT DETECTED
CALCIUM SERPL-MCNC: 9.2 MG/DL
CHLORIDE SERPL-SCNC: 108 MMOL/L
CO2 SERPL-SCNC: 22 MMOL/L
CREAT SERPL-MCNC: 1.21 MG/DL
DHEA-S SERPL-MCNC: 54.2 UG/DL
EGFR: 57 ML/MIN/1.73M2
ESTIMATED AVERAGE GLUCOSE: 108 MG/DL
ESTRADIOL SERPL-MCNC: 38 PG/ML
FSH SERPL-MCNC: 26.5 IU/L
GLUCOSE SERPL-MCNC: 94 MG/DL
HBA1C MFR BLD HPLC: 5.4 %
HPV HIGH+LOW RISK DNA PNL CVX: NOT DETECTED
LH SERPL-ACNC: 13.5 IU/L
N GONORRHOEA RRNA SPEC QL NAA+PROBE: NOT DETECTED
POTASSIUM SERPL-SCNC: 4.8 MMOL/L
PROGEST SERPL-MCNC: 0.3 NG/ML
PROT SERPL-MCNC: 7.6 G/DL
SODIUM SERPL-SCNC: 146 MMOL/L
SOURCE AMPLIFICATION: NORMAL
T4 FREE SERPL-MCNC: 1.3 NG/DL
TSH SERPL-ACNC: 1.21 UIU/ML

## 2025-03-04 DIAGNOSIS — R79.89 OTHER SPECIFIED ABNORMAL FINDINGS OF BLOOD CHEMISTRY: ICD-10-CM

## 2025-03-04 LAB
25(OH)D3 SERPL-MCNC: 6.4 NG/ML
CYTOLOGY CVX/VAG DOC THIN PREP: NORMAL
TESTOST FREE SERPL-MCNC: 0.4 PG/ML
TESTOST SERPL-MCNC: <2.5 NG/DL

## 2025-03-04 RX ORDER — VITAMIN K2 90 MCG
125 MCG CAPSULE ORAL
Qty: 4 | Refills: 0 | Status: ACTIVE | COMMUNITY
Start: 2025-03-04 | End: 1900-01-01

## 2025-03-05 ENCOUNTER — NON-APPOINTMENT (OUTPATIENT)
Age: 45
End: 2025-03-05

## 2025-03-06 ENCOUNTER — APPOINTMENT (OUTPATIENT)
Dept: INFECTIOUS DISEASE | Facility: CLINIC | Age: 45
End: 2025-03-06

## 2025-03-06 VITALS
WEIGHT: 211 LBS | SYSTOLIC BLOOD PRESSURE: 103 MMHG | HEART RATE: 111 BPM | TEMPERATURE: 98.3 F | OXYGEN SATURATION: 90 % | RESPIRATION RATE: 16 BRPM | DIASTOLIC BLOOD PRESSURE: 71 MMHG | HEIGHT: 65 IN | BODY MASS INDEX: 35.16 KG/M2

## 2025-03-06 PROCEDURE — 90679 RSV VACC PREF RECOMB ADJT IM: CPT

## 2025-03-06 PROCEDURE — G0010: CPT

## 2025-03-06 PROCEDURE — 99215 OFFICE O/P EST HI 40 MIN: CPT | Mod: 25

## 2025-03-06 PROCEDURE — 90472 IMMUNIZATION ADMIN EACH ADD: CPT

## 2025-03-06 PROCEDURE — 90739 HEPB VACC 2/4 DOSE ADULT IM: CPT

## 2025-03-06 RX ORDER — DOXYCYCLINE HYCLATE 100 MG
0 TABLET ORAL
Refills: 0 | DISCHARGE
Start: 2025-03-06

## 2025-03-06 RX ORDER — DOXYCYCLINE HYCLATE 100 MG/1
100 CAPSULE ORAL
Qty: 28 | Refills: 0 | Status: ACTIVE | COMMUNITY
Start: 2025-03-06 | End: 1900-01-01

## 2025-03-07 ENCOUNTER — APPOINTMENT (OUTPATIENT)
Dept: PULMONOLOGY | Facility: CLINIC | Age: 45
End: 2025-03-07

## 2025-03-07 ENCOUNTER — APPOINTMENT (OUTPATIENT)
Dept: PULMONOLOGY | Facility: CLINIC | Age: 45
End: 2025-03-07
Payer: MEDICAID

## 2025-03-07 ENCOUNTER — RESULT CHARGE (OUTPATIENT)
Age: 45
End: 2025-03-07

## 2025-03-07 VITALS
BODY MASS INDEX: 35.49 KG/M2 | DIASTOLIC BLOOD PRESSURE: 71 MMHG | OXYGEN SATURATION: 96 % | SYSTOLIC BLOOD PRESSURE: 108 MMHG | HEART RATE: 135 BPM | WEIGHT: 213 LBS | HEIGHT: 65 IN

## 2025-03-07 PROCEDURE — 94618 PULMONARY STRESS TESTING: CPT

## 2025-03-07 PROCEDURE — ZZZZZ: CPT

## 2025-03-07 PROCEDURE — 99214 OFFICE O/P EST MOD 30 MIN: CPT

## 2025-03-07 PROCEDURE — 99214 OFFICE O/P EST MOD 30 MIN: CPT | Mod: 25

## 2025-03-10 DIAGNOSIS — Z88.0 ALLERGY STATUS TO PENICILLIN: ICD-10-CM

## 2025-03-10 NOTE — BH CONSULTATION LIAISON ASSESSMENT NOTE - NSBHMSEAFFCONG_PSY_A_CORE
Refill request received for Famotidine 20 MG Oral Tablet (PEPCID)    Last refill 10.14.24      Follow up appointment 9.16.25  Prescription sent to MD for review and authorization     Non-congruent

## 2025-03-11 ENCOUNTER — NON-APPOINTMENT (OUTPATIENT)
Age: 45
End: 2025-03-11

## 2025-03-12 DIAGNOSIS — J84.9 INTERSTITIAL PULMONARY DISEASE, UNSPECIFIED: ICD-10-CM

## 2025-03-13 ENCOUNTER — RESULT CHARGE (OUTPATIENT)
Age: 45
End: 2025-03-13

## 2025-03-13 ENCOUNTER — APPOINTMENT (OUTPATIENT)
Dept: PULMONOLOGY | Facility: CLINIC | Age: 45
End: 2025-03-13
Payer: MEDICAID

## 2025-03-13 ENCOUNTER — NON-APPOINTMENT (OUTPATIENT)
Age: 45
End: 2025-03-13

## 2025-03-13 PROCEDURE — 94618 PULMONARY STRESS TESTING: CPT

## 2025-03-13 PROCEDURE — ZZZZZ: CPT

## 2025-03-14 ENCOUNTER — APPOINTMENT (OUTPATIENT)
Dept: PULMONOLOGY | Facility: CLINIC | Age: 45
End: 2025-03-14

## 2025-03-18 ENCOUNTER — OUTPATIENT (OUTPATIENT)
Dept: OUTPATIENT SERVICES | Facility: HOSPITAL | Age: 45
LOS: 1 days | End: 2025-03-18

## 2025-03-18 ENCOUNTER — APPOINTMENT (OUTPATIENT)
Dept: INTERNAL MEDICINE | Facility: CLINIC | Age: 45
End: 2025-03-18
Payer: MEDICAID

## 2025-03-18 ENCOUNTER — NON-APPOINTMENT (OUTPATIENT)
Age: 45
End: 2025-03-18

## 2025-03-18 ENCOUNTER — APPOINTMENT (OUTPATIENT)
Dept: ELECTROPHYSIOLOGY | Facility: CLINIC | Age: 45
End: 2025-03-18
Payer: MEDICAID

## 2025-03-18 VITALS
DIASTOLIC BLOOD PRESSURE: 70 MMHG | SYSTOLIC BLOOD PRESSURE: 110 MMHG | HEART RATE: 119 BPM | WEIGHT: 211 LBS | HEIGHT: 65 IN | OXYGEN SATURATION: 95 % | BODY MASS INDEX: 35.16 KG/M2

## 2025-03-18 DIAGNOSIS — Z95.0 PRESENCE OF CARDIAC PACEMAKER: Chronic | ICD-10-CM

## 2025-03-18 DIAGNOSIS — Z98.51 TUBAL LIGATION STATUS: Chronic | ICD-10-CM

## 2025-03-18 DIAGNOSIS — E66.812 OBESITY, CLASS 2: ICD-10-CM

## 2025-03-18 DIAGNOSIS — K86.89 OTHER SPECIFIED DISEASES OF PANCREAS: ICD-10-CM

## 2025-03-18 DIAGNOSIS — I10 ESSENTIAL (PRIMARY) HYPERTENSION: ICD-10-CM

## 2025-03-18 DIAGNOSIS — E66.01 OBESITY, CLASS 2: ICD-10-CM

## 2025-03-18 PROCEDURE — 93294 REM INTERROG EVL PM/LDLS PM: CPT

## 2025-03-18 PROCEDURE — 93296 REM INTERROG EVL PM/IDS: CPT

## 2025-03-18 PROCEDURE — 99213 OFFICE O/P EST LOW 20 MIN: CPT | Mod: GC

## 2025-03-19 ENCOUNTER — OUTPATIENT (OUTPATIENT)
Dept: OUTPATIENT SERVICES | Facility: HOSPITAL | Age: 45
LOS: 1 days | Discharge: HOME | End: 2025-03-19

## 2025-03-19 DIAGNOSIS — Z98.51 TUBAL LIGATION STATUS: Chronic | ICD-10-CM

## 2025-03-19 DIAGNOSIS — D64.9 ANEMIA, UNSPECIFIED: ICD-10-CM

## 2025-03-19 DIAGNOSIS — Z95.0 PRESENCE OF CARDIAC PACEMAKER: Chronic | ICD-10-CM

## 2025-03-21 ENCOUNTER — APPOINTMENT (OUTPATIENT)
Dept: HEMATOLOGY ONCOLOGY | Facility: CLINIC | Age: 45
End: 2025-03-21
Payer: MEDICAID

## 2025-03-21 DIAGNOSIS — D64.9 ANEMIA, UNSPECIFIED: ICD-10-CM

## 2025-03-21 PROCEDURE — 99205 OFFICE O/P NEW HI 60 MIN: CPT

## 2025-03-25 DIAGNOSIS — R10.9 UNSPECIFIED ABDOMINAL PAIN: ICD-10-CM

## 2025-03-25 DIAGNOSIS — R11.0 NAUSEA: ICD-10-CM

## 2025-03-25 RX ORDER — ONDANSETRON 4 MG/1
4 TABLET, ORALLY DISINTEGRATING ORAL
Qty: 20 | Refills: 1 | Status: ACTIVE | COMMUNITY
Start: 2025-03-25 | End: 1900-01-01

## 2025-03-25 RX ORDER — SIMETHICONE 80 MG/1
80 TABLET, CHEWABLE ORAL
Qty: 60 | Refills: 0 | Status: ACTIVE | COMMUNITY
Start: 2025-03-25 | End: 1900-01-01

## 2025-03-25 RX ORDER — ZINC OXIDE 13 %
CREAM (GRAM) TOPICAL
Qty: 30 | Refills: 1 | Status: ACTIVE | COMMUNITY
Start: 2025-03-25 | End: 1900-01-01

## 2025-03-26 ENCOUNTER — LABORATORY RESULT (OUTPATIENT)
Age: 45
End: 2025-03-26

## 2025-03-27 ENCOUNTER — APPOINTMENT (OUTPATIENT)
Dept: INFUSION THERAPY | Facility: HOSPITAL | Age: 45
End: 2025-03-27

## 2025-03-28 ENCOUNTER — APPOINTMENT (OUTPATIENT)
Dept: PULMONOLOGY | Facility: CLINIC | Age: 45
End: 2025-03-28

## 2025-03-28 ENCOUNTER — APPOINTMENT (OUTPATIENT)
Dept: PULMONOLOGY | Facility: CLINIC | Age: 45
End: 2025-03-28
Payer: MEDICAID

## 2025-03-28 VITALS
BODY MASS INDEX: 34.99 KG/M2 | WEIGHT: 210 LBS | OXYGEN SATURATION: 93 % | SYSTOLIC BLOOD PRESSURE: 103 MMHG | TEMPERATURE: 97.9 F | HEIGHT: 65 IN | RESPIRATION RATE: 16 BRPM | DIASTOLIC BLOOD PRESSURE: 72 MMHG | HEART RATE: 104 BPM

## 2025-03-28 DIAGNOSIS — Z01.818 ENCOUNTER FOR OTHER PREPROCEDURAL EXAMINATION: ICD-10-CM

## 2025-03-28 PROCEDURE — 99215 OFFICE O/P EST HI 40 MIN: CPT

## 2025-03-29 LAB
BASOPHILS # BLD AUTO: 0.07 K/UL
BASOPHILS NFR BLD AUTO: 0.6 %
EOSINOPHIL # BLD AUTO: 0.26 K/UL
EOSINOPHIL NFR BLD AUTO: 2.4 %
HCT VFR BLD CALC: 39.1 %
HGB BLD-MCNC: 12.3 G/DL
IMM GRANULOCYTES NFR BLD AUTO: 0.4 %
LYMPHOCYTES # BLD AUTO: 1.86 K/UL
LYMPHOCYTES NFR BLD AUTO: 17.1 %
MAN DIFF?: NORMAL
MCHC RBC-ENTMCNC: 22.3 PG
MCHC RBC-ENTMCNC: 31.5 G/DL
MCV RBC AUTO: 70.8 FL
MONOCYTES # BLD AUTO: 0.62 K/UL
MONOCYTES NFR BLD AUTO: 5.7 %
NEUTROPHILS # BLD AUTO: 8.02 K/UL
NEUTROPHILS NFR BLD AUTO: 73.8 %
PLATELET # BLD AUTO: 357 K/UL
RBC # BLD: 5.52 M/UL
RBC # FLD: 18.1 %
WBC # FLD AUTO: 10.87 K/UL

## 2025-03-30 LAB — ABORH: NORMAL

## 2025-04-01 ENCOUNTER — APPOINTMENT (OUTPATIENT)
Dept: PULMONOLOGY | Facility: CLINIC | Age: 45
End: 2025-04-01
Payer: MEDICAID

## 2025-04-01 VITALS
OXYGEN SATURATION: 88 % | SYSTOLIC BLOOD PRESSURE: 128 MMHG | DIASTOLIC BLOOD PRESSURE: 88 MMHG | RESPIRATION RATE: 16 BRPM | HEART RATE: 110 BPM

## 2025-04-01 VITALS — OXYGEN SATURATION: 94 %

## 2025-04-01 PROCEDURE — 96372 THER/PROPH/DIAG INJ SC/IM: CPT

## 2025-04-01 PROCEDURE — ZZZZZ: CPT

## 2025-04-01 PROCEDURE — 99214 OFFICE O/P EST MOD 30 MIN: CPT | Mod: 25

## 2025-04-01 RX ORDER — BENRALIZUMAB 30 MG/ML
30 INJECTION, SOLUTION SUBCUTANEOUS
Refills: 0 | Status: COMPLETED | OUTPATIENT
Start: 2025-04-01

## 2025-04-01 RX ADMIN — BENRALIZUMAB 30 MG/ML: 30 INJECTION, SOLUTION SUBCUTANEOUS at 00:00

## 2025-04-02 ENCOUNTER — INPATIENT (INPATIENT)
Facility: HOSPITAL | Age: 45
LOS: 21 days | Discharge: HOME | DRG: 603 | End: 2025-04-24
Attending: HOSPITALIST | Admitting: STUDENT IN AN ORGANIZED HEALTH CARE EDUCATION/TRAINING PROGRAM
Payer: MEDICAID

## 2025-04-02 VITALS
DIASTOLIC BLOOD PRESSURE: 65 MMHG | HEIGHT: 65 IN | WEIGHT: 210.1 LBS | TEMPERATURE: 98 F | SYSTOLIC BLOOD PRESSURE: 96 MMHG | HEART RATE: 93 BPM | RESPIRATION RATE: 20 BRPM | OXYGEN SATURATION: 96 %

## 2025-04-02 DIAGNOSIS — Z01.818 ENCOUNTER FOR OTHER PREPROCEDURAL EXAMINATION: ICD-10-CM

## 2025-04-02 DIAGNOSIS — K65.1 PERITONEAL ABSCESS: ICD-10-CM

## 2025-04-02 DIAGNOSIS — I27.20 PULMONARY HYPERTENSION, UNSPECIFIED: ICD-10-CM

## 2025-04-02 DIAGNOSIS — L03.90 CELLULITIS, UNSPECIFIED: ICD-10-CM

## 2025-04-02 DIAGNOSIS — Z95.0 PRESENCE OF CARDIAC PACEMAKER: Chronic | ICD-10-CM

## 2025-04-02 DIAGNOSIS — Z86.718 PERSONAL HISTORY OF OTHER VENOUS THROMBOSIS AND EMBOLISM: ICD-10-CM

## 2025-04-02 DIAGNOSIS — Z98.51 TUBAL LIGATION STATUS: Chronic | ICD-10-CM

## 2025-04-02 DIAGNOSIS — J45.909 UNSPECIFIED ASTHMA, UNCOMPLICATED: ICD-10-CM

## 2025-04-02 DIAGNOSIS — I50.810 RIGHT HEART FAILURE, UNSPECIFIED: ICD-10-CM

## 2025-04-02 LAB
ALBUMIN SERPL ELPH-MCNC: 3.8 G/DL — SIGNIFICANT CHANGE UP (ref 3.3–5)
ALP SERPL-CCNC: 64 U/L — SIGNIFICANT CHANGE UP (ref 40–120)
ALT FLD-CCNC: 10 U/L — SIGNIFICANT CHANGE UP (ref 10–45)
ANION GAP SERPL CALC-SCNC: 17 MMOL/L — SIGNIFICANT CHANGE UP (ref 5–17)
APPEARANCE UR: CLEAR — SIGNIFICANT CHANGE UP
APTT BLD: 36.8 SEC — HIGH (ref 24.5–35.6)
AST SERPL-CCNC: 16 U/L — SIGNIFICANT CHANGE UP (ref 10–40)
BASOPHILS # BLD AUTO: 0 K/UL — SIGNIFICANT CHANGE UP (ref 0–0.2)
BASOPHILS NFR BLD AUTO: 0 % — SIGNIFICANT CHANGE UP (ref 0–2)
BILIRUB SERPL-MCNC: 0.5 MG/DL — SIGNIFICANT CHANGE UP (ref 0.2–1.2)
BILIRUB UR-MCNC: NEGATIVE — SIGNIFICANT CHANGE UP
BUN SERPL-MCNC: 6 MG/DL — LOW (ref 7–23)
CALCIUM SERPL-MCNC: 8.9 MG/DL — SIGNIFICANT CHANGE UP (ref 8.4–10.5)
CHLORIDE SERPL-SCNC: 104 MMOL/L — SIGNIFICANT CHANGE UP (ref 96–108)
CO2 SERPL-SCNC: 17 MMOL/L — LOW (ref 22–31)
COLOR SPEC: YELLOW — SIGNIFICANT CHANGE UP
CREAT SERPL-MCNC: 0.82 MG/DL — SIGNIFICANT CHANGE UP (ref 0.5–1.3)
DIFF PNL FLD: NEGATIVE — SIGNIFICANT CHANGE UP
EGFR: 90 ML/MIN/1.73M2 — SIGNIFICANT CHANGE UP
EGFR: 90 ML/MIN/1.73M2 — SIGNIFICANT CHANGE UP
ELLIPTOCYTES BLD QL SMEAR: SLIGHT — SIGNIFICANT CHANGE UP
EOSINOPHIL # BLD AUTO: 0 K/UL — SIGNIFICANT CHANGE UP (ref 0–0.5)
EOSINOPHIL NFR BLD AUTO: 0 % — SIGNIFICANT CHANGE UP (ref 0–6)
GAS PNL BLDV: SIGNIFICANT CHANGE UP
GAS PNL BLDV: SIGNIFICANT CHANGE UP
GIANT PLATELETS BLD QL SMEAR: PRESENT — SIGNIFICANT CHANGE UP
GLUCOSE SERPL-MCNC: 84 MG/DL — SIGNIFICANT CHANGE UP (ref 70–99)
GLUCOSE UR QL: NEGATIVE MG/DL — SIGNIFICANT CHANGE UP
HCG SERPL-ACNC: <2 MIU/ML — SIGNIFICANT CHANGE UP
HCT VFR BLD CALC: 43.4 % — SIGNIFICANT CHANGE UP (ref 34.5–45)
HGB BLD-MCNC: 13.7 G/DL — SIGNIFICANT CHANGE UP (ref 11.5–15.5)
INR BLD: 1.14 RATIO — SIGNIFICANT CHANGE UP (ref 0.85–1.16)
KETONES UR-MCNC: ABNORMAL MG/DL
LEUKOCYTE ESTERASE UR-ACNC: NEGATIVE — SIGNIFICANT CHANGE UP
LYMPHOCYTES # BLD AUTO: 1.55 K/UL — SIGNIFICANT CHANGE UP (ref 1–3.3)
LYMPHOCYTES # BLD AUTO: 18.6 % — SIGNIFICANT CHANGE UP (ref 13–44)
MANUAL SMEAR VERIFICATION: SIGNIFICANT CHANGE UP
MCHC RBC-ENTMCNC: 22.3 PG — LOW (ref 27–34)
MCHC RBC-ENTMCNC: 31.6 G/DL — LOW (ref 32–36)
MCV RBC AUTO: 70.7 FL — LOW (ref 80–100)
MONOCYTES # BLD AUTO: 0.44 K/UL — SIGNIFICANT CHANGE UP (ref 0–0.9)
MONOCYTES NFR BLD AUTO: 5.3 % — SIGNIFICANT CHANGE UP (ref 2–14)
NEUTROPHILS # BLD AUTO: 6.36 K/UL — SIGNIFICANT CHANGE UP (ref 1.8–7.4)
NEUTROPHILS NFR BLD AUTO: 76.1 % — SIGNIFICANT CHANGE UP (ref 43–77)
NITRITE UR-MCNC: NEGATIVE — SIGNIFICANT CHANGE UP
PH UR: 6 — SIGNIFICANT CHANGE UP (ref 5–8)
PLAT MORPH BLD: NORMAL — SIGNIFICANT CHANGE UP
PLATELET # BLD AUTO: 317 K/UL — SIGNIFICANT CHANGE UP (ref 150–400)
POIKILOCYTOSIS BLD QL AUTO: SLIGHT — SIGNIFICANT CHANGE UP
POLYCHROMASIA BLD QL SMEAR: SLIGHT — SIGNIFICANT CHANGE UP
POTASSIUM SERPL-MCNC: 3.9 MMOL/L — SIGNIFICANT CHANGE UP (ref 3.5–5.3)
POTASSIUM SERPL-SCNC: 3.9 MMOL/L — SIGNIFICANT CHANGE UP (ref 3.5–5.3)
PROT SERPL-MCNC: 7.3 G/DL — SIGNIFICANT CHANGE UP (ref 6–8.3)
PROT UR-MCNC: NEGATIVE MG/DL — SIGNIFICANT CHANGE UP
PROTHROM AB SERPL-ACNC: 13 SEC — SIGNIFICANT CHANGE UP (ref 9.9–13.4)
RBC # BLD: 6.14 M/UL — HIGH (ref 3.8–5.2)
RBC # FLD: 18 % — HIGH (ref 10.3–14.5)
RBC BLD AUTO: ABNORMAL
SODIUM SERPL-SCNC: 138 MMOL/L — SIGNIFICANT CHANGE UP (ref 135–145)
SP GR SPEC: 1.02 — SIGNIFICANT CHANGE UP (ref 1–1.03)
TARGETS BLD QL SMEAR: SLIGHT — SIGNIFICANT CHANGE UP
UROBILINOGEN FLD QL: 1 MG/DL — SIGNIFICANT CHANGE UP (ref 0.2–1)
WBC # BLD: 8.36 K/UL — SIGNIFICANT CHANGE UP (ref 3.8–10.5)
WBC # FLD AUTO: 8.36 K/UL — SIGNIFICANT CHANGE UP (ref 3.8–10.5)

## 2025-04-02 PROCEDURE — 99285 EMERGENCY DEPT VISIT HI MDM: CPT

## 2025-04-02 PROCEDURE — 99222 1ST HOSP IP/OBS MODERATE 55: CPT

## 2025-04-02 PROCEDURE — 99223 1ST HOSP IP/OBS HIGH 75: CPT | Mod: GC

## 2025-04-02 PROCEDURE — 74177 CT ABD & PELVIS W/CONTRAST: CPT | Mod: 26

## 2025-04-02 PROCEDURE — 71045 X-RAY EXAM CHEST 1 VIEW: CPT | Mod: 26

## 2025-04-02 PROCEDURE — 93010 ELECTROCARDIOGRAM REPORT: CPT

## 2025-04-02 RX ORDER — ACETAMINOPHEN 500 MG/5ML
1000 LIQUID (ML) ORAL ONCE
Refills: 0 | Status: COMPLETED | OUTPATIENT
Start: 2025-04-02 | End: 2025-04-02

## 2025-04-02 RX ORDER — IPRATROPIUM BROMIDE AND ALBUTEROL SULFATE .5; 2.5 MG/3ML; MG/3ML
3 SOLUTION RESPIRATORY (INHALATION) EVERY 6 HOURS
Refills: 0 | Status: DISCONTINUED | OUTPATIENT
Start: 2025-04-02 | End: 2025-04-19

## 2025-04-02 RX ORDER — ALBUTEROL SULFATE 2.5 MG/3ML
2.5 VIAL, NEBULIZER (ML) INHALATION ONCE
Refills: 0 | Status: COMPLETED | OUTPATIENT
Start: 2025-04-02 | End: 2025-04-03

## 2025-04-02 RX ORDER — SILDENAFIL 50 MG/1
20 TABLET, FILM COATED ORAL EVERY 8 HOURS
Refills: 0 | Status: DISCONTINUED | OUTPATIENT
Start: 2025-04-02 | End: 2025-04-03

## 2025-04-02 RX ORDER — ENOXAPARIN SODIUM 100 MG/ML
100 INJECTION SUBCUTANEOUS EVERY 12 HOURS
Refills: 0 | Status: DISCONTINUED | OUTPATIENT
Start: 2025-04-02 | End: 2025-04-02

## 2025-04-02 RX ORDER — BUDESONIDE 0.25 MG/2ML
0.5 SUSPENSION RESPIRATORY (INHALATION) DAILY
Refills: 0 | Status: DISCONTINUED | OUTPATIENT
Start: 2025-04-02 | End: 2025-04-14

## 2025-04-02 RX ORDER — ALBUTEROL SULFATE 2.5 MG/3ML
2 VIAL, NEBULIZER (ML) INHALATION EVERY 6 HOURS
Refills: 0 | Status: DISCONTINUED | OUTPATIENT
Start: 2025-04-02 | End: 2025-04-13

## 2025-04-02 RX ORDER — ACETAMINOPHEN 500 MG/5ML
1000 LIQUID (ML) ORAL EVERY 6 HOURS
Refills: 0 | Status: DISCONTINUED | OUTPATIENT
Start: 2025-04-02 | End: 2025-04-20

## 2025-04-02 RX ORDER — SPIRONOLACTONE 25 MG
50 TABLET ORAL
Refills: 0 | Status: DISCONTINUED | OUTPATIENT
Start: 2025-04-02 | End: 2025-04-07

## 2025-04-02 RX ORDER — BUMETANIDE 1 MG/1
1 TABLET ORAL DAILY
Refills: 0 | Status: DISCONTINUED | OUTPATIENT
Start: 2025-04-02 | End: 2025-04-07

## 2025-04-02 RX ORDER — ENOXAPARIN SODIUM 100 MG/ML
40 INJECTION SUBCUTANEOUS EVERY 24 HOURS
Refills: 0 | Status: DISCONTINUED | OUTPATIENT
Start: 2025-04-02 | End: 2025-04-02

## 2025-04-02 RX ORDER — CEFEPIME 2 G/20ML
1000 INJECTION, POWDER, FOR SOLUTION INTRAVENOUS ONCE
Refills: 0 | Status: COMPLETED | OUTPATIENT
Start: 2025-04-02 | End: 2025-04-02

## 2025-04-02 RX ORDER — LINEZOLID 2 MG/ML
600 INJECTION, SOLUTION INTRAVENOUS EVERY 12 HOURS
Refills: 0 | Status: DISCONTINUED | OUTPATIENT
Start: 2025-04-03 | End: 2025-04-06

## 2025-04-02 RX ORDER — HYDROMORPHONE/SOD CHLOR,ISO/PF 2 MG/10 ML
0.5 SYRINGE (ML) INJECTION ONCE
Refills: 0 | Status: DISCONTINUED | OUTPATIENT
Start: 2025-04-02 | End: 2025-04-02

## 2025-04-02 RX ORDER — LINEZOLID 2 MG/ML
600 INJECTION, SOLUTION INTRAVENOUS ONCE
Refills: 0 | Status: COMPLETED | OUTPATIENT
Start: 2025-04-02 | End: 2025-04-02

## 2025-04-02 RX ORDER — HYDROMORPHONE/SOD CHLOR,ISO/PF 2 MG/10 ML
0.5 SYRINGE (ML) INJECTION EVERY 4 HOURS
Refills: 0 | Status: DISCONTINUED | OUTPATIENT
Start: 2025-04-02 | End: 2025-04-04

## 2025-04-02 RX ORDER — INFLUENZA A VIRUS A/IDAHO/07/2018 (H1N1) ANTIGEN (MDCK CELL DERIVED, PROPIOLACTONE INACTIVATED, INFLUENZA A VIRUS A/INDIANA/08/2018 (H3N2) ANTIGEN (MDCK CELL DERIVED, PROPIOLACTONE INACTIVATED), INFLUENZA B VIRUS B/SINGAPORE/INFTT-16-0610/2016 ANTIGEN (MDCK CELL DERIVED, PROPIOLACTONE INACTIVATED), INFLUENZA B VIRUS B/IOWA/06/2017 ANTIGEN (MDCK CELL DERIVED, PROPIOLACTONE INACTIVATED) 15; 15; 15; 15 UG/.5ML; UG/.5ML; UG/.5ML; UG/.5ML
0.5 INJECTION, SUSPENSION INTRAMUSCULAR ONCE
Refills: 0 | Status: DISCONTINUED | OUTPATIENT
Start: 2025-04-02 | End: 2025-04-18

## 2025-04-02 RX ORDER — MONTELUKAST SODIUM 10 MG/1
10 TABLET ORAL DAILY
Refills: 0 | Status: DISCONTINUED | OUTPATIENT
Start: 2025-04-02 | End: 2025-04-24

## 2025-04-02 RX ADMIN — Medication 4 MILLIGRAM(S): at 13:00

## 2025-04-02 RX ADMIN — Medication 0.5 MILLIGRAM(S): at 19:25

## 2025-04-02 RX ADMIN — Medication 50 MILLIGRAM(S): at 19:27

## 2025-04-02 RX ADMIN — Medication 0.5 MILLIGRAM(S): at 18:57

## 2025-04-02 RX ADMIN — Medication 4 MILLIGRAM(S): at 13:30

## 2025-04-02 RX ADMIN — LINEZOLID 300 MILLIGRAM(S): 2 INJECTION, SOLUTION INTRAVENOUS at 15:01

## 2025-04-02 RX ADMIN — IPRATROPIUM BROMIDE AND ALBUTEROL SULFATE 3 MILLILITER(S): .5; 2.5 SOLUTION RESPIRATORY (INHALATION) at 17:15

## 2025-04-02 RX ADMIN — CEFEPIME 100 MILLIGRAM(S): 2 INJECTION, POWDER, FOR SOLUTION INTRAVENOUS at 12:42

## 2025-04-02 RX ADMIN — Medication 0.5 MILLIGRAM(S): at 23:11

## 2025-04-02 RX ADMIN — SILDENAFIL 20 MILLIGRAM(S): 50 TABLET, FILM COATED ORAL at 22:40

## 2025-04-02 NOTE — H&P ADULT - NSICDXPASTSURGICALHX_GEN_ALL_CORE_FT
PAST SURGICAL HISTORY:  History of pacemaker 12/19 - [employer] model Ingevity 4469    Pacemaker

## 2025-04-02 NOTE — H&P ADULT - PROBLEM SELECTOR PLAN 2
At home takes Atrovert nebulizer, spiriva, pulmicort?    PLAN  - continue here as therapeutic interchange atrovent nebulizer ipratropium PRM   - continue home equivalent pulmicort, spiriva, PRN nebs  - continue flonase  - continue on baseline 2L O2   - continue to watch respiratory status

## 2025-04-02 NOTE — H&P ADULT - PROBLEM SELECTOR PLAN 4
On xerelto 15 outpatient for previus DVT     PLAN  - hold for now pending surgery Eval On Xarelto 15 outpatient for previus DVT     PLAN  - hold for now pending surgery Eval

## 2025-04-02 NOTE — CHART NOTE - NSCHARTNOTEFT_GEN_A_CORE
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 Patient p/w right sided abdominal pain x2 weeks, was started on doxycycline by her pulmonologist 5 days ago however pain and swelling have worsened. Patient has had numerous previous similar abscesses in the past which are related to continuous subcutaneous injections of Treprostinil. States the area has turned into an abscess and has started to drain spontaneously. Pt referred to ED for further evaluation and management of abdominal abscess. IR now consulted for drainage of abdominal wall abscess.     -Upon further discussion with the ED team, collection is fairly superficial and easy to access therefore I&D requested  -I&D not performed by IR. Recommended surgery consult for I&D  -IR to sign off please re-consult as necessary   -above d/w ED team    Maryellen Avila PA-C  Interventional Radiology  Available on TEAMS/ IR ext. [phone]    - Non-emergent consults: Place IR consult order in [place]  - Emergent issues (pager): [facility] [phone]; [facility] [phone]; [phone]  - Scheduling questions: [facility] [phone]; [facility] [phone]  - Clinic/outpatient booking: [facility] [phone]; [facility] [phone].

## 2025-04-02 NOTE — H&P ADULT - PROBLEM SELECTOR PLAN 6
- ongoing pre-lung transplant workup  - seen by pulm transplant service     PLAN  - no inpatient recommendations for now   - home supplemental O2 2L NC, keep sats > 92%, avoid hyperoxia   - consider Cardiac MRI while inpatient as a part of ongoing transplant evaluation

## 2025-04-02 NOTE — H&P ADULT - PROBLEM SELECTOR PLAN 1
Sent in by outside pulmonary doctor for concern that abscess was not resolving   Patient has Remodulin pump in the area of the abscess   Started two weeks ago and was started on doxycycline   Denies fevers, chills, vomiting, myalgias during this time period   Had chronic diarrhea and occasional vomiting as a known side effects of some of her medications   Not currently meeting SIRS criteria or septic   Patient is allergic to vancomycin     PLAN  - Linezolid 600 Q12 hours for now   - If patient begins to decompensate, can switch to Daptomycin 6mg/kg  - Blood cultures x2  - CT abdomen pelvis to   - Strep pneumo and legionella

## 2025-04-02 NOTE — CONSULT NOTE ADULT - SUBJECTIVE AND OBJECTIVE BOX
SURGERY CONSULT NOTE    HPI:  45 yo F w/ PMHx pHTN (on treprostinil infusion) currently on [facility] list, HF (s/p PPM dual chamber ), ?PE, DVT on DOAC, SVT (s/p ablation 2020), asthma, h/o MRSA abdominal wall abscesses last , and JULIA. Patient p/w right sided abdominal pain x2 weeks, was started on doxycycline by her pulmonologist 5 days ago however pain and swelling have worsened    Patient has had numerous previous similar cutaneous abscesses in the past and seen by surgery for bedside inicision and drainage, which are related to continuous subcutaneous injections of Treprostinil. States the area has turned into an abscess and has started to drain spontaneously. Pt referred to ED for further evaluation and management of abdominal abscess. Patient admitted to medicine for treatment w/ IV abx. Surgery consulted for evaluation for I&D.     Patient is afebrile, /72, on 2L NC. Labs show WBC 8.36, H/H 13.7/43.4. Lactate 0.7.         PAST MEDICAL HISTORY:  Asthma    Pulmonary hypertension    Tachycardia    Anemia    Pulmonary hypertension    Pulmonary embolism    Asthma    Sinus tachycardia    Asthma with status asthmaticus, unspecified asthma severity    PE (pulmonary thromboembolism)    Keratoconus    Sinusitis    Corneal disorder    Right heart failure    CAD (coronary artery disease)    H/O pulmonary hypertension    Pulmonary embolism    Asthma    History of tachycardia    On supplemental oxygen by nasal cannula    Pacemaker    Skin mass    Right atrial thrombus        PAST SURGICAL HISTORY:  History of tubal ligation    No significant past surgical history    Pacemaker    H/O tubal ligation    History of pacemaker        MEDICATIONS:  acetaminophen     Tablet .. 1000 milliGRAM(s) Oral every 6 hours PRN  albuterol    0.083%. 2.5 milliGRAM(s) Nebulizer once  albuterol    90 MICROgram(s) HFA Inhaler 2 Puff(s) Inhalation every 6 hours PRN  albuterol/ipratropium for Nebulization 3 milliLiter(s) Nebulizer every 6 hours PRN  buDESOnide    Inhalation Suspension 0.5 milliGRAM(s) Inhalation daily  buMETAnide 1 milliGRAM(s) Oral daily  HYDROmorphone  Injectable 0.5 milliGRAM(s) IV Push every 4 hours PRN  influenza   Vaccine 0.5 milliLiter(s) IntraMuscular once  montelukast 10 milliGRAM(s) Oral daily  pantoprazole    Tablet 40 milliGRAM(s) Oral before breakfast  sildenafil (REVATIO) 20 milliGRAM(s) Oral every 8 hours  spironolactone 50 milliGRAM(s) Oral two times a day      ALLERGIES:  vancomycin (Rash)  penicillin (Rash)  adhesives (Rash)      VITALS & I/Os:  Vital Signs Last 24 Hrs  T(C): 36.7 (2025 15:43), Max: 36.7 (2025 15:43)  T(F): 98.1 (2025 15:43), Max: 98.1 (2025 15:43)  HR: 85 (2025 15:43) (80 - 100)  BP: 103/72 (2025 15:43) (90/65 - 153/103)  BP(mean): 73 (2025 13:22) (73 - 73)  RR: 18 (2025 15:43) (17 - 22)  SpO2: 99% (2025 15:43) (96% - 99%)    Parameters below as of 2025 15:43    O2 Flow (L/min): 2        PHYSICAL EXAM:  General: No acute distress  Respiratory: Nonlabored on 2L NC   Cardiovascular: RRR  Abdominal: Soft, nondistended. ~10x7cm area of hyperpigmentation, induration and mild erythema w/ 5x3cm fluctuant center. Tender to palpation.   Extremities: Warm        LABS:                        13.7   8.36  )-----------( 317      ( 2025 12:35 )             43.4         138  |  104  |  6[L]  ----------------------------<  84  3.9   |  17[L]  |  0.82    Ca    8.9      2025 12:35    TPro  7.3  /  Alb  3.8  /  TBili  0.5  /  DBili  x   /  AST  16  /  ALT  10  /  AlkPhos  64  04-    Lactate: Lactate, Blood: 1.7 mmol/L ( @ 12:37)    @ 14:27  0.7    PT/INR - ( 2025 13:35 )   PT: 13.0 sec;   INR: 1.14 ratio         PTT - ( 2025 14:27 )  PTT:36.8 sec      Urinalysis Basic - ( 2025 15:13 )    Color: Yellow / Appearance: Clear / S.017 / pH: x  Gluc: x / Ketone: Trace mg/dL  / Bili: Negative / Urobili: 1.0 mg/dL   Blood: x / Protein: Negative mg/dL / Nitrite: Negative   Leuk Esterase: Negative / RBC: x / WBC x   Sq Epi: x / Non Sq Epi: x / Bacteria: x     SURGERY CONSULT NOTE    HPI:  45 yo F w/ PMHx pHTN (on treprostinil infusion) currently on lung transplant list, HF (s/p PPM dual chamber ), ?PE, DVT on DOAC, SVT (s/p ablation 2020), asthma, h/o MRSA abdominal wall abscesses last , and JULIA. Patient p/w right sided abdominal pain x2 weeks, was started on doxycycline by her pulmonologist 5 days ago however pain and swelling have worsened    Patient has had numerous previous similar cutaneous abscesses in the past and seen by surgery for bedside incision and drainage, which are related to continuous subcutaneous injections of Treprostinil. States the area has turned into an abscess and has started to drain spontaneously. Pt referred to ED for further evaluation and management of abdominal abscess. Patient admitted to medicine for treatment w/ IV abx. Surgery consulted for evaluation for I&D.     Patient is afebrile, /72, on 2L NC. Labs show WBC 8.36, H/H 13.7/43.4. Lactate 0.7.         PAST MEDICAL HISTORY:  Asthma    Pulmonary hypertension    Tachycardia    Anemia    Pulmonary hypertension    Pulmonary embolism    Asthma    Sinus tachycardia    Asthma with status asthmaticus, unspecified asthma severity    PE (pulmonary thromboembolism)    Keratoconus    Sinusitis    Corneal disorder    Right heart failure    CAD (coronary artery disease)    H/O pulmonary hypertension    Pulmonary embolism    Asthma    History of tachycardia    On supplemental oxygen by nasal cannula    Pacemaker    Skin mass    Right atrial thrombus        PAST SURGICAL HISTORY:  History of tubal ligation    No significant past surgical history    Pacemaker    H/O tubal ligation    History of pacemaker        MEDICATIONS:  acetaminophen     Tablet .. 1000 milliGRAM(s) Oral every 6 hours PRN  albuterol    0.083%. 2.5 milliGRAM(s) Nebulizer once  albuterol    90 MICROgram(s) HFA Inhaler 2 Puff(s) Inhalation every 6 hours PRN  albuterol/ipratropium for Nebulization 3 milliLiter(s) Nebulizer every 6 hours PRN  buDESOnide    Inhalation Suspension 0.5 milliGRAM(s) Inhalation daily  buMETAnide 1 milliGRAM(s) Oral daily  HYDROmorphone  Injectable 0.5 milliGRAM(s) IV Push every 4 hours PRN  influenza   Vaccine 0.5 milliLiter(s) IntraMuscular once  montelukast 10 milliGRAM(s) Oral daily  pantoprazole    Tablet 40 milliGRAM(s) Oral before breakfast  sildenafil (REVATIO) 20 milliGRAM(s) Oral every 8 hours  spironolactone 50 milliGRAM(s) Oral two times a day      ALLERGIES:  vancomycin (Rash)  penicillin (Rash)  adhesives (Rash)      VITALS & I/Os:  Vital Signs Last 24 Hrs  T(C): 36.7 (2025 15:43), Max: 36.7 (2025 15:43)  T(F): 98.1 (2025 15:43), Max: 98.1 (2025 15:43)  HR: 85 (2025 15:43) (80 - 100)  BP: 103/72 (2025 15:43) (90/65 - 153/103)  BP(mean): 73 (2025 13:22) (73 - 73)  RR: 18 (2025 15:43) (17 - 22)  SpO2: 99% (2025 15:43) (96% - 99%)    Parameters below as of 2025 15:43    O2 Flow (L/min): 2        PHYSICAL EXAM:  General: No acute distress  Respiratory: Nonlabored on 2L NC   Cardiovascular: RRR  Abdominal: Soft, nondistended. ~10x7cm area of hyperpigmentation, induration and mild erythema w/ 5x3cm fluctuant center in RUQ. Tender to palpation.   Extremities: Warm        LABS:                        13.7   8.36  )-----------( 317      ( 2025 12:35 )             43.4         138  |  104  |  6[L]  ----------------------------<  84  3.9   |  17[L]  |  0.82    Ca    8.9      2025 12:35    TPro  7.3  /  Alb  3.8  /  TBili  0.5  /  DBili  x   /  AST  16  /  ALT  10  /  AlkPhos  64  04-    Lactate: Lactate, Blood: 1.7 mmol/L ( @ 12:37)    @ 14:27  0.7    PT/INR - ( 2025 13:35 )   PT: 13.0 sec;   INR: 1.14 ratio         PTT - ( 2025 14:27 )  PTT:36.8 sec      Urinalysis Basic - ( 2025 15:13 )    Color: Yellow / Appearance: Clear / S.017 / pH: x  Gluc: x / Ketone: Trace mg/dL  / Bili: Negative / Urobili: 1.0 mg/dL   Blood: x / Protein: Negative mg/dL / Nitrite: Negative   Leuk Esterase: Negative / RBC: x / WBC x   Sq Epi: x / Non Sq Epi: x / Bacteria: x

## 2025-04-02 NOTE — H&P ADULT - NSHPLABSRESULTS_GEN_ALL_CORE
13.7   8.36  )-----------( 317      ( 2025 12:35 )             43.4       04    138  |  104  |  6[L]  ----------------------------<  84  3.9   |  17[L]  |  0.82    Ca    8.9      2025 12:35    TPro  7.3  /  Alb  3.8  /  TBili  0.5  /  DBili  x   /  AST  16  /  ALT  10  /  AlkPhos  64  04-02              Urinalysis Basic - ( 2025 15:13 )    Color: Yellow / Appearance: Clear / S.017 / pH: x  Gluc: x / Ketone: Trace mg/dL  / Bili: Negative / Urobili: 1.0 mg/dL   Blood: x / Protein: Negative mg/dL / Nitrite: Negative   Leuk Esterase: Negative / RBC: x / WBC x   Sq Epi: x / Non Sq Epi: x / Bacteria: x        PT/INR - ( 2025 13:35 )   PT: 13.0 sec;   INR: 1.14 ratio         PTT - ( 2025 14:27 )  PTT:36.8 sec    Lactate Trend   @ 12:37 Lactate:1.7             CAPILLARY BLOOD GLUCOSE

## 2025-04-02 NOTE — H&P ADULT - ASSESSMENT
44y F , with PMH of pHTN (on treprostinil infusion still continuing ), Right   Heart failure(s/p PPM dual chamber ), chronic PE (dx ) on DOAC, SVT (s/p ablation 2020), asthma, h/o MRSA abdominal wall abscesses last , and JULIA presenting for treatement of new abdominal abscess

## 2025-04-02 NOTE — ED ADULT NURSE NOTE - CAS TRG GEN SKIN CONDITION
----- Message from Sophie Whitten MD sent at 10/19/2021  9:32 AM CDT -----  Please notify patient her mammogram was completely normal we will repeat in 1 year   Warm/Dry

## 2025-04-02 NOTE — CONSULT NOTE ADULT - ASSESSMENT
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 Patient p/w right sided abdominal pain x2 weeks and recurrent c/f abscess, was started on doxycycline outpatient however pain and swelling have worsened. Surgery consulted for evaluation for possible I&D    Recommendations:   - H/o recurrent cutaneous abscesses from subcutaneous infusion pump now w/ likely new RLQ abscess  - Recommend CT A/P non-con vs US for further evaluation   - Please hold therapeutic anticoagulation pending imaging   - Will evaluate for I&D pending above work-up  - Abx and infectious w/u per primary   - Discussed w/ patient and primary team     Discussed w/ attending     ACS/Trauma  [id number] 45 yo F w/ PMHx pHTN (on treprostinil infusion) currently on [facility] transplant list, HF (s/p PPM dual chamber 2016), ?PE, DVT on DOAC, SVT (s/p ablation 05/2020), asthma, 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 Patient p/w right sided abdominal pain x2 weeks and recurrent c/f abscess, was started on doxycycline outpatient however pain and swelling have worsened. Surgery consulted for evaluation for possible I&D    Recommendations:   - H/o recurrent cutaneous abscesses from subcutaneous infusion pump now w/ likely new R-sided abdominal abscess  - Recommend CT A/P non-con vs US for further evaluation   - Please hold therapeutic anticoagulation pending imaging   - Will evaluate for I&D pending above work-up  - Abx and infectious w/u per primary   - Discussed w/ patient and primary team     Discussed w/ attending     ACS/Trauma  [id number]

## 2025-04-02 NOTE — CONSULT NOTE ADULT - SUBJECTIVE AND OBJECTIVE BOX
Lung Transplant Consultation  =====================================================  HPI:      PAST MEDICAL & SURGICAL HISTORY:  Tachycardia      Anemia      Pulmonary hypertension      Pulmonary embolism      Asthma  On home O2 nightly at 2L via NC      PE (pulmonary thromboembolism)  on Xarelto 10 mg daily      Sinusitis      Corneal disorder      Right heart failure      CAD (coronary artery disease)      H/O pulmonary hypertension      Pulmonary embolism      Asthma      History of tachycardia      On supplemental oxygen by nasal cannula  O2 @ 2L      Pacemaker      Skin mass  left upper thigh mass      Right atrial thrombus      History of tubal ligation      Pacemaker      H/O tubal ligation      History of pacemaker  12/19 - [employer] model Ingevity 4469        Home Meds: Home Medications:  Atrovent 18 mcg/inh inhalation aerosol: 1 inhaled 4 times a day as needed for  shortness of breath and/or wheezing (05 Feb 2025 09:48)  omeprazole 40 mg oral delayed release capsule: 1 cap(s) orally once a day (05 Feb 2025 09:48)  Remodulin 5 mg/mL injectable solution: 1 application injectable once a day via her own SQ PUMP (05 Feb 2025 09:46)  sildenafil 20 mg oral tablet: 1 tab(s) orally every 8 hours (05 Feb 2025 09:48)  Xarelto 15 mg oral tablet: 1 tab(s) orally once a day resume tomorrow 2/6 (05 Feb 2025 11:48)    Allergies: Allergies    vancomycin (Rash)  penicillin (Rash)  adhesives (Rash)    Intolerances      Soc:   Advanced Directives: Presumed Full Code     Height (cm): 165.1 (04-02-25 @ 10:54)  Weight (kg): 95.3 (04-02-25 @ 10:54)  BMI (kg/m2): 35 (04-02-25 @ 10:54)  BSA (m2): 2.02 (04-02-25 @ 10:54)    Performs activitis of daily living with *** assistance   Non-diabetic ***  Requires *** L NC at rest   *** assisted ventilation needed    REVIEW OF SYSTEMS    [ ] A ten-point review of systems was otherwise negative except as noted.  [ ] Due to altered mental status/intubation, subjective information were not able to be obtained from the patient. History was obtained, to the extent possible, from review of the chart and collateral sources of information.      CURRENT MEDICATIONS:   --------------------------------------------------------------------------------------  Neurologic Medications  acetaminophen   IVPB .. 1000 milliGRAM(s) IV Intermittent Once  morphine  - Injectable 4 milliGRAM(s) IV Push Once    Respiratory Medications    Cardiovascular Medications    Gastrointestinal Medications    Genitourinary Medications    Hematologic/Oncologic Medications    Antimicrobial/Immunologic Medications    Endocrine/Metabolic Medications    Topical/Other Medications    --------------------------------------------------------------------------------------    VITAL SIGNS, INS/OUTS (last 24 hours):  --------------------------------------------------------------------------------------  ICU Vital Signs Last 24 Hrs  T(C): 36.5 (02 Apr 2025 12:13), Max: 36.6 (02 Apr 2025 10:54)  T(F): 97.7 (02 Apr 2025 12:13), Max: 97.8 (02 Apr 2025 10:54)  HR: 95 (02 Apr 2025 12:13) (93 - 95)  BP: 153/103 (02 Apr 2025 12:13) (96/65 - 153/103)  BP(mean): --  ABP: --  ABP(mean): --  RR: 22 (02 Apr 2025 12:13) (20 - 22)  SpO2: 97% (02 Apr 2025 12:13) (96% - 97%)    O2 Parameters below as of 02 Apr 2025 12:13  Patient On (Oxygen Delivery Method): nasal cannula  O2 Flow (L/min): 2        I&O's Summary    --------------------------------------------------------------------------------------  PHYSICAL EXAM:  GENERAL: NAD, resting comfortably in bed  HEAD:  Atraumatic, normocephalic  EYES: EOMI, PERRLA, conjunctiva and sclera clear  NECK: Supple  CHEST/LUNG: CTA B/L, good inspiratory effort.  HEART: RRR. +S1/S2.  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 was admitted to [facility] 10/13/24-10/29/24 for pre-transplant expedited workup. Sent to ED 4/2/25 from MD for abdominal abscess      PAST MEDICAL & SURGICAL HISTORY:  Tachycardia      Anemia      Pulmonary hypertension      Pulmonary embolism      Asthma  On home O2 nightly at 2L via NC      PE (pulmonary thromboembolism)  on Xarelto 10 mg daily      Sinusitis      Corneal disorder      Right heart failure      CAD (coronary artery disease)      H/O pulmonary hypertension      Pulmonary embolism      Asthma      History of tachycardia      On supplemental oxygen by nasal cannula  O2 @ 2L      Pacemaker      Skin mass  left upper thigh mass      Right atrial thrombus      History of tubal ligation      Pacemaker      H/O tubal ligation      History of pacemaker  12/19 - [employer] model Ingevity 4469        Home Meds: Home Medications:  Atrovent 18 mcg/inh inhalation aerosol: 1 inhaled 4 times a day as needed for  shortness of breath and/or wheezing (05 Feb 2025 09:48)  omeprazole 40 mg oral delayed release capsule: 1 cap(s) orally once a day (05 Feb 2025 09:48)  Remodulin 5 mg/mL injectable solution: 1 application injectable once a day via her own SQ PUMP (05 Feb 2025 09:46)  sildenafil 20 mg oral tablet: 1 tab(s) orally every 8 hours (05 Feb 2025 09:48)  Xarelto 15 mg oral tablet: 1 tab(s) orally once a day resume tomorrow 2/6 (05 Feb 2025 11:48)    Allergies: Allergies    vancomycin (Rash)  penicillin (Rash)  adhesives (Rash)    Intolerances      Soc:   Advanced Directives: Presumed Full Code     Height (cm): 165.1 (04-02-25 @ 10:54)  Weight (kg): 95.3 (04-02-25 @ 10:54)  BMI (kg/m2): 35 (04-02-25 @ 10:54)  BSA (m2): 2.02 (04-02-25 @ 10:54)    Performs activitis of daily living with *** assistance   Non-diabetic ***  Requires *** L NC at rest   *** assisted ventilation needed    REVIEW OF SYSTEMS    [x] A ten-point review of systems was otherwise negative except as noted.  [ ] Due to altered mental status/intubation, subjective information were not able to be obtained from the patient. History was obtained, to the extent possible, from review of the chart and collateral sources of information.      CURRENT MEDICATIONS:   --------------------------------------------------------------------------------------  Neurologic Medications  acetaminophen   IVPB .. 1000 milliGRAM(s) IV Intermittent Once  morphine  - Injectable 4 milliGRAM(s) IV Push Once    Respiratory Medications    Cardiovascular Medications    Gastrointestinal Medications    Genitourinary Medications    Hematologic/Oncologic Medications    Antimicrobial/Immunologic Medications    Endocrine/Metabolic Medications    Topical/Other Medications    --------------------------------------------------------------------------------------    VITAL SIGNS, INS/OUTS (last 24 hours):  --------------------------------------------------------------------------------------  ICU Vital Signs Last 24 Hrs  T(C): 36.5 (02 Apr 2025 12:13), Max: 36.6 (02 Apr 2025 10:54)  T(F): 97.7 (02 Apr 2025 12:13), Max: 97.8 (02 Apr 2025 10:54)  HR: 95 (02 Apr 2025 12:13) (93 - 95)  BP: 153/103 (02 Apr 2025 12:13) (96/65 - 153/103)  BP(mean): --  ABP: --  ABP(mean): --  RR: 22 (02 Apr 2025 12:13) (20 - 22)  SpO2: 97% (02 Apr 2025 12:13) (96% - 97%)    O2 Parameters below as of 02 Apr 2025 12:13  Patient On (Oxygen Delivery Method): nasal cannula  O2 Flow (L/min): 2        I&O's Summary    --------------------------------------------------------------------------------------  PHYSICAL EXAM:  GENERAL: NAD, resting comfortably in bed on NC  HEAD:  Atraumatic, normocephalic  EYES: EOMI, PERRLA, conjunctiva and sclera clear  NECK: Supple  CHEST/LUNG: CTA B/L, good inspiratory effort.  HEART: RRR. +S1/S2.  ABDOMEN: Soft, nondistended, mild tenderness to palpation, + BS  EXTREMITIES: no clubbing, cyanosis, or edema  PSYCH: A&O x3  NEUROLOGY: Non-focal, motor & sensory grossly intact  SKIN: Warm & dry, no rashes or lesions  --------------------------------------------------------------------------------------    Labs:                   Lung Transplant Consultation  =====================================================  HPI: 44 year old female with history of Group 1 PAH on treprostinil infusion, HF s/p PPM dual chamber (2016), chronic PE (2017) on DOAC, SVT s/p ablation (5/2020), asthma, and MRSA abdominal wall abscesses (last 2/25). Was previously admitted to [facility] 10/13/24-10/29/24 for pre-transplant expedited workup. Sent to ED 4/2/25 for worsening abdominal abscess. Denies fevers. Respiratory status at baseline. Denies worsening SOB.      PAST MEDICAL & SURGICAL HISTORY:  Tachycardia      Anemia      Pulmonary hypertension      Pulmonary embolism      Asthma  On home O2 nightly at 2L via NC      PE (pulmonary thromboembolism)  on Xarelto 10 mg daily      Sinusitis      Corneal disorder      Right heart failure      CAD (coronary artery disease)      H/O pulmonary hypertension      Pulmonary embolism      Asthma      History of tachycardia      On supplemental oxygen by nasal cannula  O2 @ 2L      Pacemaker      Skin mass  left upper thigh mass      Right atrial thrombus      History of tubal ligation      Pacemaker      H/O tubal ligation      History of pacemaker  12/19 - [employer] model Ingevity 4469        Home Meds: Home Medications:  Atrovent 18 mcg/inh inhalation aerosol: 1 inhaled 4 times a day as needed for  shortness of breath and/or wheezing (05 Feb 2025 09:48)  omeprazole 40 mg oral delayed release capsule: 1 cap(s) orally once a day (05 Feb 2025 09:48)  Remodulin 5 mg/mL injectable solution: 1 application injectable once a day via her own SQ PUMP (05 Feb 2025 09:46)  sildenafil 20 mg oral tablet: 1 tab(s) orally every 8 hours (05 Feb 2025 09:48)  Xarelto 15 mg oral tablet: 1 tab(s) orally once a day resume tomorrow 2/6 (05 Feb 2025 11:48)    Allergies: Allergies    vancomycin (Rash)  penicillin (Rash)  adhesives (Rash)    Intolerances      Soc:   Advanced Directives: Presumed Full Code     Height (cm): 165.1 (04-02-25 @ 10:54)  Weight (kg): 95.3 (04-02-25 @ 10:54)  BMI (kg/m2): 35 (04-02-25 @ 10:54)  BSA (m2): 2.02 (04-02-25 @ 10:54)    Performs activitis of daily living with *** assistance   Non-diabetic ***  Requires *** L NC at rest   *** assisted ventilation needed    REVIEW OF SYSTEMS    [x] A ten-point review of systems was otherwise negative except as noted.  [ ] Due to altered mental status/intubation, subjective information were not able to be obtained from the patient. History was obtained, to the extent possible, from review of the chart and collateral sources of information.      CURRENT MEDICATIONS:   --------------------------------------------------------------------------------------  Neurologic Medications  acetaminophen   IVPB .. 1000 milliGRAM(s) IV Intermittent Once  morphine  - Injectable 4 milliGRAM(s) IV Push Once    Respiratory Medications    Cardiovascular Medications    Gastrointestinal Medications    Genitourinary Medications    Hematologic/Oncologic Medications    Antimicrobial/Immunologic Medications    Endocrine/Metabolic Medications    Topical/Other Medications    --------------------------------------------------------------------------------------    VITAL SIGNS, INS/OUTS (last 24 hours):  --------------------------------------------------------------------------------------  ICU Vital Signs Last 24 Hrs  T(C): 36.5 (02 Apr 2025 12:13), Max: 36.6 (02 Apr 2025 10:54)  T(F): 97.7 (02 Apr 2025 12:13), Max: 97.8 (02 Apr 2025 10:54)  HR: 95 (02 Apr 2025 12:13) (93 - 95)  BP: 153/103 (02 Apr 2025 12:13) (96/65 - 153/103)  BP(mean): --  ABP: --  ABP(mean): --  RR: 22 (02 Apr 2025 12:13) (20 - 22)  SpO2: 97% (02 Apr 2025 12:13) (96% - 97%)    O2 Parameters below as of 02 Apr 2025 12:13  Patient On (Oxygen Delivery Method): nasal cannula  O2 Flow (L/min): 2        I&O's Summary    --------------------------------------------------------------------------------------  PHYSICAL EXAM:  GENERAL: NAD, resting comfortably in bed on NC  HEAD:  Atraumatic, normocephalic  EYES: EOMI, PERRLA, conjunctiva and sclera clear  NECK: Supple  CHEST/LUNG: CTA B/L, good inspiratory effort.  HEART: RRR. +S1/S2.  ABDOMEN: Soft, nondistended, mild tenderness to palpation, + BS  EXTREMITIES: no clubbing, cyanosis, or edema  PSYCH: A&O x3  NEUROLOGY: Non-focal, motor & sensory grossly intact  SKIN: Abscess noted abdominal wall  --------------------------------------------------------------------------------------    Labs Reviewed

## 2025-04-02 NOTE — H&P ADULT - NSHPREVIEWOFSYSTEMS_GEN_ALL_CORE
REVIEW OF SYSTEMS:  CONSTITUTIONAL: No weakness. No fevers. No chills.   ENT/NECK: No dysphagia. No sore throat. No Sinusitis/rhinorrhea.  CARDIAC: No chest pain. No palpitations  RESPIRATORY: No cough. mild SOB at rest  GASTROINTESTINAL: Draining abscess present on the right abdomen   GENITOURINARY: No dysuria. No frequency.   NEUROLOGICAL: No numbness/tingling. No focal weakness. No headache.  EXTREMITIES: No lower extremity edema. Full ROM. No joint pain.  SKIN: No rashes. No other lesions.  HEMATOLOGIC/LYMPHATIC: Bruising around the right   PSYCHIATRIC: No depression. No anxiety. No SI/HI.

## 2025-04-02 NOTE — H&P ADULT - ATTENDING COMMENTS
Abdominal wall abscess with history of prior MRSA- continue Linezolid (reported Vancomycin allergy)- CT with IV contrast ordered prior to possible I&D by surgery- transplant ID eval in am  Chronic hypoxic respiratory failure on home O2 due to pHTN with right sided heart failure- pulmonary following- continue Remodulin, Sildenafil, diuretics  DVT- holding Xarelto for possible surgical debridement/I&D Abdominal wall abscess with history of prior MRSA- continue Linezolid (reported Vancomycin allergy)- CT with IV contrast ordered prior to possible I&D by surgery- transplant ID eval in am  Chronic hypoxic respiratory failure on home O2 due to pHTN with right sided heart failure- pulmonary following- continue Remodulin, Sildenafil, diuretics  DVT history- holding Xarelto for possible surgical debridement/I&D- start Lovenox 1 mg/kg BID

## 2025-04-02 NOTE — H&P ADULT - NSHPPHYSICALEXAM_GEN_ALL_CORE
PHYSICAL EXAM:   GENERAL: Alert. Not confused. No acute distress. Not thin. Not cachectic. Not obese.  HEAD:  Atraumatic. Normocephalic.  EYES: EOMI. PERRLA. Normal conjunctiva/sclera.  CARDIAC: Regular rate. Regular rhythm. S1. S2.   LUNG/CHEST: CTAB. BS equal bilaterally. mild wheezes.   ABDOMEN: Abscess with leaking drainage present oon RIght abdomen with medical pump on the let  EXTREMITIES:  No clubbing. No cyanosis. No edema. Moving all 4.  NEUROLOGY: A&Ox3. Non-focal exam.  PSYCH: Normal behavior. Normal affect.    -

## 2025-04-02 NOTE — H&P ADULT - PROBLEM SELECTOR PLAN 3
Seen outpatient by Dr Yoon who sent into the hospital   Has Remodulin pump near sight of infection  Also take sildenafil     PLAN  - Pulmonary following   - -continue home sildenafil 20mg TID  - continue home Remodulin 51ng/kg/min on home pump  - continue home bumex, aldactone

## 2025-04-02 NOTE — H&P ADULT - NSICDXPASTMEDICALHX_GEN_ALL_CORE_FT
PAST MEDICAL HISTORY:  Anemia     Asthma On home O2 nightly at 2L via NC    H/O pulmonary hypertension     History of tachycardia     On supplemental oxygen by nasal cannula O2 @ 2L    Pacemaker     PE (pulmonary thromboembolism) on Xarelto 10 mg daily    Pulmonary embolism     Pulmonary hypertension     Right atrial thrombus     Right heart failure     Sinusitis

## 2025-04-02 NOTE — ED PROVIDER NOTE - OBJECTIVE STATEMENT
44-year-old female with PMH pulmonary hypertension on lung transplant list, PE on Xarelto, heart failure here for evaluation of abdominal abscess.  Patient states she has noticed pain in the right side of her abdomen for the past 2 weeks, was started on doxycycline by her pulmonologist 5 days ago however pain and swelling have worsened.  Patient saw her pulmonologist yesterday who felt she should be evaluated in the emergency department.  She denies any fevers or chills, states the area has turned into an abscess and has started to drain spontaneously.  Patient has had numerous previous similar abscesses in the past which are related to continuous subcutaneous injections of Treprostinil.

## 2025-04-02 NOTE — H&P ADULT - PROBLEM SELECTOR PLAN 5
2/2 to pulmonary hypertension   Seen by dr Yi outpatient     PLAN  - Hold home nifedipine   - continue home bumex and aldactone per pulm

## 2025-04-02 NOTE — H&P ADULT - HISTORY OF PRESENT ILLNESS
44y F , with PMH of pHTN (on treprostinil infusion still continuing ), Right   Heart failure(s/p PPM dual chamber ), chronic PE (dx ) on DOAC, SVT (s/p ablation 2020), asthma, h/o MRSA abdominal wall abscesses last , and JULIA. was admitted after being sent in from outpatient pulmonologist. for concern of worsening abdominal abscess in proximity to medication pump that has been unresponsive to doxycyline. Per patient she started having abdominal pain and increased swelling two weeks ago. Went into her outpatient transplant ID doctor Dr Ash and was given a course of doxycyline yesterday she presented to her pulmonologist DR Yoon who was concerned about the size of the abscess and that it was not improving. She was sent in for further evaluation Patient denied cough, fevers, myalgias vomiting ot diarrhea during the time period of the abscess formations     ED course   Tmax 97.7, , /56 on 2L home   Not meeting SIRS criteria   Linezolid and Cefepime, Ofirmev, Morphine

## 2025-04-02 NOTE — ED PROVIDER NOTE - PHYSICAL EXAMINATION
A&Ox3, NAD, well appearing  Lungs CTAB. No w/r/r, breathing comfortably on 2L NC.   Cardiac  RRR  Abd soft, approx 10x5cm superficial area of fluctuance which is TTP of the R lower abdomen with surrounding warmth and erythema, otherwise abdomen is NT/ND, no rebound or guarding.   Skin without rash.   No focal Deficits

## 2025-04-02 NOTE — CONSULT NOTE ADULT - ASSESSMENT
43 y/o female, PMH pHTN (on treprostinil infusion), HF (s/p PPM dual chamber 2016), chronic PE (dx 2017) on DOAC, SVT (s/p ablation 05/2020), asthma, h/o MRSA abdominal wall abscesses last 2/'25, and JULIA. was admitted to [facility] 10/13/24-10/29/24 for pre-transplant expedited workup. Sent to ED 4/2/25 from MD for abdominal abscess      Pulm/Pre Lung transplant:  - ongoing pre-lung transplant workup  -home supplemental O2 2L NC, keep sats > 92%, avoid hyperoxia   -inhaled duonebs prn    Cards:  - will need cardiac MRI as outpatient    Renal:  - avoid nephrotoxic medication  -IVF in setting of sepsis    GI:  #abdominal abscess  - appreciate surgery recs for abscess management  -PPI for gi ppx  -Diet: per surgical service  -Bowel regimen  - abdominal MRI as outpatient after abscess treatment for pre lung txp workup    ID:  -Lung Txp Empiric abx:  IV vanco, IV zosyn, IV doxy, inhaled huang x 14 days      AC:  - DVT ppx    MSK  - PT/OT, ambulate x 2 / day      Case discussed with Dr. Wright 45 y/o female, PMH pHTN (on treprostinil infusion), HF (s/p PPM dual chamber 2016), chronic PE (dx 2017) on DOAC, SVT (s/p ablation 05/2020), asthma, h/o MRSA abdominal wall abscesses last 2/'25, and JULIA. was admitted to [facility] 10/13/24-10/29/24 for pre-transplant expedited workup. Sent to ED 4/2/25 from MD for abdominal abscess      Pulm/Pre Lung transplant:  - ongoing pre-lung transplant workup  - home supplemental O2 2L NC, keep sats > 92%, avoid hyperoxia   - inhaled duonebs prn  - no current inpatient recs, defer to pulmonary team for manegement    Cards:  - will need cardiac MRI as outpatient    Renal:  - avoid nephrotoxic medication  -IVF in setting of sepsis    GI:  #abdominal abscess  - appreciate surgery recs for abscess management  -PPI for gi ppx  -Diet: per surgical service  -Bowel regimen  - abdominal MRI as outpatient after abscess treatment for pre lung txp workup    ID:  -Lung Txp Empiric abx:  IV vanco, IV zosyn, IV doxy, inhaled huang x 14 days      AC:  - DVT ppx    MSK  - PT/OT, ambulate x 2 / day      Case discussed with Dr. Wright 44 year old female with history of chronic hypoxic respiratory failure, Group 1 PAH on treprostinil infusion, HF s/p PPM dual chamber (2016), chronic PE (2017) on DOAC, SVT s/p ablation (5/2020), asthma, and MRSA abdominal wall abscesses (last 2/25). Was previously admitted to [facility] 10/13/24-10/29/24 for pre-transplant expedited workup. Sent to ED 4/2/25 for worsening abdominal abscess.    Pre Lung transplant:  - Ongoing pre-lung transplant workup  - Planned for outpatient cMRI. May arrange as inpatient if feasible    PAH  - Patient in Treprostinil infusion as well as sildenafil  - Respiratory status at baseline - saturating well on 2 L   - Follow up with pulmonary team    Abdominal abscess  - I&D as per surgery  - Transplant ID following - continue empiric abx    Case discussed with Dr. Wright

## 2025-04-02 NOTE — ED PROVIDER NOTE - CLINICAL SUMMARY MEDICAL DECISION MAKING FREE TEXT BOX
dr kohli - chf, phtn pw abd abscess. needs ir. abx. likely admit. if septic needs 150 dr kohli - chf, phtn pw abd abscess. needs ir. abx. likely admit. if septic needs 150  I read ekg as nsr rate 87, right axis, rvh, qtc 498, narrow qrs, no st elevation or depression.

## 2025-04-02 NOTE — CONSULT NOTE ADULT - SUBJECTIVE AND OBJECTIVE BOX
CHIEF COMPLAINT:    HPI:      44F w/ PMH of pHTN on Remodulin sq, sildenafil, UE DVT on Xarelto, Asthma, CHF  She is also following with transplant pulmonary and is finalizing pre-transplant evaluation.  Comes to the ED with concern with concern for abdominal abscesses. She was treated for this in the outpatient setting with doxycycline for the past 5 days without improvement.  Now being admitted for I&D and likely IV abx.  Skin lesions first started about 2 weeks ago. No fevers, chills, however local tenderness. Of note, she has a history of abdominal wall abscesses in the past.    Pulmonary consulted given patient's longstanding pulmonary hypertension.    Patient afebrile, no leukocytosis, electrolytes, kidney function at baseline.      PAST MEDICAL & SURGICAL HISTORY:  Tachycardia      Anemia      Pulmonary hypertension      Pulmonary embolism      Asthma  On home O2 nightly at 2L via NC      PE (pulmonary thromboembolism)  on Xarelto 10 mg daily      Sinusitis      Corneal disorder      Right heart failure      CAD (coronary artery disease)      H/O pulmonary hypertension      Pulmonary embolism      Asthma      History of tachycardia      On supplemental oxygen by nasal cannula  O2 @ 2L      Pacemaker      Skin mass  left upper thigh mass      Right atrial thrombus      History of tubal ligation      Pacemaker      H/O tubal ligation      History of pacemaker   - [employer] model Ingevity 4469          FAMILY HISTORY:  Family history of diabetes mellitus  in brother    Family history of diabetes mellitus in mother    FH: anemia        SOCIAL HISTORY:  Smoking: [ ] Never Smoked [ ] Former Smoker (__ packs x ___ years) [ ] Current Smoker  (__ packs x ___ years)  Substance Use: [ ] Never Used [ ] Used ____  EtOH Use:  Marital Status: [ ] Single [ ]  [ ]  [ ]   Sexual History:   Occupation:  Recent Travel:  Country of Birth:  Advance Directives:    Allergies    vancomycin (Rash)  penicillin (Rash)  adhesives (Rash)    Intolerances        HOME MEDICATIONS:  Home Medications:  Atrovent 18 mcg/inh inhalation aerosol: 1 inhaled 4 times a day as needed for  shortness of breath and/or wheezing (2025 09:48)  omeprazole 40 mg oral delayed release capsule: 1 cap(s) orally once a day (2025 09:48)  Remodulin 5 mg/mL injectable solution: 1 application injectable once a day via her own SQ PUMP (2025 09:46)  sildenafil 20 mg oral tablet: 1 tab(s) orally every 8 hours (2025 09:48)  Xarelto 15 mg oral tablet: 1 tab(s) orally once a day resume tomorrow  (2025 11:48)      REVIEW OF SYSTEMS:  [ x ] All other systems negative  [ ] Unable to assess ROS because ________    OBJECTIVE:  ICU Vital Signs Last 24 Hrs  T(C): 36.7 (2025 15:43), Max: 36.7 (2025 15:43)  T(F): 98.1 (2025 15:43), Max: 98.1 (2025 15:43)  HR: 85 (2025 15:43) (80 - 100)  BP: 103/72 (2025 15:43) (90/65 - 153/103)  BP(mean): 73 (2025 13:22) (73 - 73)  ABP: --  ABP(mean): --  RR: 18 (2025 15:43) (17 - 22)  SpO2: 99% (2025 15:43) (96% - 99%)    O2 Parameters below as of 2025 15:43    O2 Flow (L/min): 2            CAPILLARY BLOOD GLUCOSE          PHYSICAL EXAM:  General: NAD  HEENT: Normal sclera  Lymph Nodes: No LAD  Respiratory: ctabl  Cardiovascular: Regular rate and rhythm no m/r/g  Abdomen: Nontender nondistended, multiple old scars and a fluctuating R anterior wound area  Extremities: No peripheral edema  Skin: No rashes  Neurological: No appreciable neurologic deficits  Psychiatry: Appropriate mood and affect    LINES:     HOSPITAL MEDICATIONS:  Standing Meds:  albuterol    0.083%. 2.5 milliGRAM(s) Nebulizer once  influenza   Vaccine 0.5 milliLiter(s) IntraMuscular once      PRN Meds:  albuterol    90 MICROgram(s) HFA Inhaler 2 Puff(s) Inhalation every 6 hours PRN  albuterol/ipratropium for Nebulization 3 milliLiter(s) Nebulizer every 6 hours PRN      LABS:                        13.7   8.36  )-----------( 317      ( 2025 12:35 )             43.4     Hgb Trend: 13.7<--  04-02    138  |  104  |  6[L]  ----------------------------<  84  3.9   |  17[L]  |  0.82    Ca    8.9      2025 12:35    TPro  7.3  /  Alb  3.8  /  TBili  0.5  /  DBili  x   /  AST  16  /  ALT  10  /  AlkPhos  64  04-02    Creatinine Trend: 0.82<--  PT/INR - ( 2025 13:35 )   PT: 13.0 sec;   INR: 1.14 ratio         PTT - ( 2025 14:27 )  PTT:36.8 sec  Urinalysis Basic - ( 2025 15:13 )    Color: Yellow / Appearance: Clear / S.017 / pH: x  Gluc: x / Ketone: Trace mg/dL  / Bili: Negative / Urobili: 1.0 mg/dL   Blood: x / Protein: Negative mg/dL / Nitrite: Negative   Leuk Esterase: Negative / RBC: x / WBC x   Sq Epi: x / Non Sq Epi: x / Bacteria: x        Venous Blood Gas:   @ 14:27  7.35/44/40/24/61.3  VBG Lactate: 0.7      MICROBIOLOGY:     Urinalysis with Rflx Culture (collected 2025 15:13)        RADIOLOGY:  [ x ] Reviewed and interpreted by me    PULMONARY FUNCTION TESTS:    EKG:

## 2025-04-02 NOTE — CHART NOTE - NSCHARTNOTEFT_GEN_A_CORE
Confidential Drug Utilization Report  Search Terms: Liz Conway, 1980Search Date: 04/02/2025 15:13:15 PM  Searching on behalf of: [id number] - [facility]  The Drug Utilization Report below displays all of the controlled substance prescriptions, if any, that your patient has filled in the last twelve months. The information displayed on this report is compiled from pharmacy submissions to the Department, and accurately reflects the information as submitted by the pharmacies.    This report was requested by: Lg Chowdhury | Reference #: [id number]    Practitioner Count: 1  Pharmacy Count: 1  Current Opioid Prescriptions: 0  Current Benzodiazepine Prescriptions: 0  Current Stimulant Prescriptions: 0      Patient Demographic Information (PDI)       PDI	First Name	Last Name	Birth Date	Gender	Street Address	[facility]	Zip Code  A	Liz Conway	1980	Female	[address] [facility]	[address]  B	Liz Conway	1980	Female	[address]	[facility]	[address]    Prescription Information      PDI Filter:    PDI	Current Rx	Drug Type	Rx Written	Rx Dispensed	Drug	Quantity	Days Supply	Prescriber Name	Prescriber HAYLIE #	Payment Method	Dispenser  A	N	O	02/15/2025	02/16/2025	oxycodone hcl (ir) 5 mg tablet	6	2	Chelsy Lawrence	[id number]	Medicaid	[id number]  B	N	O	12/13/2024	12/16/2024	acetaminophen-cod #2 tablet	7	7	Aminah Monroy NP	[id number]	Medicaid	[facility]    * - Details of Drug Type : O = Opioid, B = Benzodiazepine, S = Stimulant    * - Drugs marked with an asterisk are compound drugs. If the compound drug is made up of more than one controlled substance, then each controlled substance will be a separate row in the table.

## 2025-04-02 NOTE — PATIENT PROFILE ADULT - FALL HARM RISK - HARM RISK INTERVENTIONS

## 2025-04-02 NOTE — ED ADULT NURSE NOTE - OBJECTIVE STATEMENT
43 y/o female A&Ox4 c/o abscess x2 weeks associated with her SQ port for pulmonary HTN medication infusion, has been on doxycycline for 2 weeks without improvement of symptoms. PMH of pulomonary HTN, HF, asthma, anemia. pt has allergies to vancomycin and PCN. pt  at bedside, bed in lowest position, belongings at bedside.

## 2025-04-03 ENCOUNTER — NON-APPOINTMENT (OUTPATIENT)
Age: 45
End: 2025-04-03

## 2025-04-03 ENCOUNTER — APPOINTMENT (OUTPATIENT)
Dept: INFUSION THERAPY | Facility: HOSPITAL | Age: 45
End: 2025-04-03

## 2025-04-03 DIAGNOSIS — Z29.9 ENCOUNTER FOR PROPHYLACTIC MEASURES, UNSPECIFIED: ICD-10-CM

## 2025-04-03 PROBLEM — H18.9 UNSPECIFIED DISORDER OF CORNEA: Chronic | Status: INACTIVE | Noted: 2020-07-21 | Resolved: 2025-04-02

## 2025-04-03 PROBLEM — R22.9 LOCALIZED SWELLING, MASS AND LUMP, UNSPECIFIED: Chronic | Status: INACTIVE | Noted: 2021-04-29 | Resolved: 2025-04-02

## 2025-04-03 PROBLEM — J45.909 UNSPECIFIED ASTHMA, UNCOMPLICATED: Chronic | Status: INACTIVE | Noted: 2021-04-29 | Resolved: 2025-04-02

## 2025-04-03 LAB
ANION GAP SERPL CALC-SCNC: 12 MMOL/L — SIGNIFICANT CHANGE UP (ref 5–17)
B2 GLYCOPROT1 IGA SER QL: <2 U/ML — SIGNIFICANT CHANGE UP
B2 GLYCOPROT1 IGG SER-ACNC: <1.4 U/ML — SIGNIFICANT CHANGE UP
B2 GLYCOPROT1 IGM SER-ACNC: <1.5 U/ML — SIGNIFICANT CHANGE UP
BUN SERPL-MCNC: 5 MG/DL — LOW (ref 7–23)
CALCIUM SERPL-MCNC: 8.6 MG/DL — SIGNIFICANT CHANGE UP (ref 8.4–10.5)
CARDIOLIPIN IGM SER-MCNC: <1.5 MPL U/ML — SIGNIFICANT CHANGE UP
CARDIOLIPIN IGM SER-MCNC: <1.6 GPL U/ML — SIGNIFICANT CHANGE UP
CHLORIDE SERPL-SCNC: 103 MMOL/L — SIGNIFICANT CHANGE UP (ref 96–108)
CO2 SERPL-SCNC: 20 MMOL/L — LOW (ref 22–31)
CREAT SERPL-MCNC: 0.87 MG/DL — SIGNIFICANT CHANGE UP (ref 0.5–1.3)
DEPRECATED CARDIOLIPIN IGA SER: <2 APL U/ML — SIGNIFICANT CHANGE UP
EGFR: 84 ML/MIN/1.73M2 — SIGNIFICANT CHANGE UP
EGFR: 84 ML/MIN/1.73M2 — SIGNIFICANT CHANGE UP
FERRITIN SERPL-MCNC: 13 NG/ML — LOW (ref 15–150)
GAS PNL BLDV: SIGNIFICANT CHANGE UP
GLUCOSE SERPL-MCNC: 77 MG/DL — SIGNIFICANT CHANGE UP (ref 70–99)
GRAM STN FLD: SIGNIFICANT CHANGE UP
HCT VFR BLD CALC: 37.6 % — SIGNIFICANT CHANGE UP (ref 34.5–45)
HCYS SERPL-MCNC: 18.1 UMOL/L — HIGH
HGB BLD-MCNC: 11.7 G/DL — SIGNIFICANT CHANGE UP (ref 11.5–15.5)
MAGNESIUM SERPL-MCNC: 1.8 MG/DL — SIGNIFICANT CHANGE UP (ref 1.6–2.6)
MCHC RBC-ENTMCNC: 21.9 PG — LOW (ref 27–34)
MCHC RBC-ENTMCNC: 31.1 G/DL — LOW (ref 32–36)
MCV RBC AUTO: 70.4 FL — LOW (ref 80–100)
MEV IGG SER-ACNC: 72.8 AU/ML — SIGNIFICANT CHANGE UP
MEV IGG+IGM SER-IMP: POSITIVE — SIGNIFICANT CHANGE UP
MUV AB SER-ACNC: POSITIVE — SIGNIFICANT CHANGE UP
MUV IGG FLD-ACNC: 24.2 AU/ML — SIGNIFICANT CHANGE UP
NRBC BLD AUTO-RTO: 0 /100 WBCS — SIGNIFICANT CHANGE UP (ref 0–0)
PHOSPHATE SERPL-MCNC: 3.9 MG/DL — SIGNIFICANT CHANGE UP (ref 2.5–4.5)
PLATELET # BLD AUTO: 336 K/UL — SIGNIFICANT CHANGE UP (ref 150–400)
POTASSIUM SERPL-MCNC: 3.9 MMOL/L — SIGNIFICANT CHANGE UP (ref 3.5–5.3)
POTASSIUM SERPL-SCNC: 3.9 MMOL/L — SIGNIFICANT CHANGE UP (ref 3.5–5.3)
RBC # BLD: 5.34 M/UL — HIGH (ref 3.8–5.2)
RBC # FLD: 17.1 % — HIGH (ref 10.3–14.5)
RUBV IGG SER-ACNC: 1.7 INDEX — SIGNIFICANT CHANGE UP
RUBV IGG SER-IMP: POSITIVE — SIGNIFICANT CHANGE UP
SODIUM SERPL-SCNC: 135 MMOL/L — SIGNIFICANT CHANGE UP (ref 135–145)
SPECIMEN SOURCE: SIGNIFICANT CHANGE UP
VZV IGG FLD QL IA: 3.53 S/CO — SIGNIFICANT CHANGE UP
VZV IGG FLD QL IA: POSITIVE — SIGNIFICANT CHANGE UP
WBC # BLD: 6.62 K/UL — SIGNIFICANT CHANGE UP (ref 3.8–10.5)
WBC # FLD AUTO: 6.62 K/UL — SIGNIFICANT CHANGE UP (ref 3.8–10.5)

## 2025-04-03 PROCEDURE — 10061 I&D ABSCESS COMP/MULTIPLE: CPT | Mod: GC

## 2025-04-03 PROCEDURE — 76937 US GUIDE VASCULAR ACCESS: CPT | Mod: 26

## 2025-04-03 PROCEDURE — 99232 SBSQ HOSP IP/OBS MODERATE 35: CPT | Mod: GC

## 2025-04-03 PROCEDURE — 99233 SBSQ HOSP IP/OBS HIGH 50: CPT | Mod: GC

## 2025-04-03 PROCEDURE — G0452: CPT | Mod: 26

## 2025-04-03 PROCEDURE — 99223 1ST HOSP IP/OBS HIGH 75: CPT

## 2025-04-03 RX ORDER — FLUTICASONE PROPIONATE 50 UG/1
1 SPRAY, METERED NASAL
Refills: 0 | Status: DISCONTINUED | OUTPATIENT
Start: 2025-04-03 | End: 2025-04-24

## 2025-04-03 RX ORDER — MAGNESIUM SULFATE 500 MG/ML
2 SYRINGE (ML) INJECTION ONCE
Refills: 0 | Status: COMPLETED | OUTPATIENT
Start: 2025-04-03 | End: 2025-04-03

## 2025-04-03 RX ORDER — LIDOCAINE HCL/EPINEPHRINE/PF 1 %-1:200K
20 AMPUL (ML) INJECTION ONCE
Refills: 0 | Status: COMPLETED | OUTPATIENT
Start: 2025-04-03 | End: 2025-04-03

## 2025-04-03 RX ORDER — SILDENAFIL 50 MG/1
20 TABLET, FILM COATED ORAL EVERY 8 HOURS
Refills: 0 | Status: DISCONTINUED | OUTPATIENT
Start: 2025-04-03 | End: 2025-04-06

## 2025-04-03 RX ADMIN — SILDENAFIL 20 MILLIGRAM(S): 50 TABLET, FILM COATED ORAL at 13:09

## 2025-04-03 RX ADMIN — FLUTICASONE PROPIONATE 1 SPRAY(S): 50 SPRAY, METERED NASAL at 16:54

## 2025-04-03 RX ADMIN — IPRATROPIUM BROMIDE AND ALBUTEROL SULFATE 3 MILLILITER(S): .5; 2.5 SOLUTION RESPIRATORY (INHALATION) at 19:55

## 2025-04-03 RX ADMIN — Medication 40 MILLIGRAM(S): at 06:28

## 2025-04-03 RX ADMIN — Medication 0.5 MILLIGRAM(S): at 13:25

## 2025-04-03 RX ADMIN — Medication 0.5 MILLIGRAM(S): at 13:55

## 2025-04-03 RX ADMIN — Medication 50 MILLIGRAM(S): at 16:55

## 2025-04-03 RX ADMIN — SILDENAFIL 20 MILLIGRAM(S): 50 TABLET, FILM COATED ORAL at 21:59

## 2025-04-03 RX ADMIN — MONTELUKAST SODIUM 10 MILLIGRAM(S): 10 TABLET ORAL at 13:09

## 2025-04-03 RX ADMIN — LINEZOLID 300 MILLIGRAM(S): 2 INJECTION, SOLUTION INTRAVENOUS at 16:54

## 2025-04-03 RX ADMIN — Medication 2.5 MILLIGRAM(S): at 03:30

## 2025-04-03 RX ADMIN — SILDENAFIL 20 MILLIGRAM(S): 50 TABLET, FILM COATED ORAL at 06:28

## 2025-04-03 RX ADMIN — Medication 0.5 MILLIGRAM(S): at 21:10

## 2025-04-03 RX ADMIN — Medication 0.5 MILLIGRAM(S): at 00:11

## 2025-04-03 RX ADMIN — LINEZOLID 300 MILLIGRAM(S): 2 INJECTION, SOLUTION INTRAVENOUS at 03:15

## 2025-04-03 RX ADMIN — Medication 0.5 MILLIGRAM(S): at 20:39

## 2025-04-03 RX ADMIN — Medication 25 GRAM(S): at 13:25

## 2025-04-03 NOTE — PROGRESS NOTE ADULT - ATTENDING COMMENTS
44F w/ PMH of pHTN on Remodulin sq, sildenafil, UE DVT on Xarelto, Asthma, CHF  She is also following with transplant pulmonary and is finalizing pre-transplant evaluation.  Comes to the ED with concern with concern for abdominal abscesses. She was treated for this in the outpatient setting with doxycycline for the past 5 days without improvement.  Now being admitted for I&D and likely IV abx.  Skin lesions first started about 2 weeks ago. No fevers, chills, however local tenderness.  From the pulmonary standpoint she is clinically at her baseline and without complaints.  Abdominal CT showed abdominal wall abscess and GGO in lung bases, seen previously on CT in 10/24 and may represent mosaic attenuation from PH.   She had debridement of abscess today with cultures sent.   Denies breathing difficulty,  Lungs are clear on exam, VSS she is afebrile  previously grew MRSA from abdominal wall abscess in 2/2025.   Continue Linezolid pending culture results.   Agree with plan as outlined above.

## 2025-04-03 NOTE — PROGRESS NOTE ADULT - ATTENDING COMMENTS
Abdominal wall abscess with history of prior MRSA- continue Linezolid- CT results noted- surgery to perform I&D today- transplant ID eval underway  Chronic hypoxic respiratory failure on home O2 due to pHTN with right sided heart failure- pulmonary following- continue Remodulin, Sildenafil, diuretics  DVT history- holding Xarelto for I&D- resume post procedure

## 2025-04-03 NOTE — PROGRESS NOTE ADULT - SUBJECTIVE AND OBJECTIVE BOX
PROGRESS NOTE:   Authored by Dr. Rivera Carter MD     Patient is a 44y old  Female who presents with a chief complaint of     SUBJECTIVE / OVERNIGHT EVENTS:  No acute events overnight.     MEDICATIONS  (STANDING):  buDESOnide    Inhalation Suspension 0.5 milliGRAM(s) Inhalation daily  buMETAnide 1 milliGRAM(s) Oral daily  influenza   Vaccine 0.5 milliLiter(s) IntraMuscular once  linezolid  IVPB 600 milliGRAM(s) IV Intermittent every 12 hours  montelukast 10 milliGRAM(s) Oral daily  pantoprazole    Tablet 40 milliGRAM(s) Oral before breakfast  sildenafil (REVATIO) 20 milliGRAM(s) Oral every 8 hours  spironolactone 50 milliGRAM(s) Oral two times a day    MEDICATIONS  (PRN):  acetaminophen     Tablet .. 1000 milliGRAM(s) Oral every 6 hours PRN Mild Pain (1 - 3), Moderate Pain (4 - 6)  albuterol    90 MICROgram(s) HFA Inhaler 2 Puff(s) Inhalation every 6 hours PRN Shortness of Breath  albuterol/ipratropium for Nebulization 3 milliLiter(s) Nebulizer every 6 hours PRN Shortness of Breath  HYDROmorphone  Injectable 0.5 milliGRAM(s) IV Push every 4 hours PRN Severe Pain (7 - 10)      CAPILLARY BLOOD GLUCOSE        I&O's Summary      PHYSICAL EXAM:  Vital Signs Last 24 Hrs  T(C): 36.5 (2025 04:47), Max: 36.7 (2025 15:43)  T(F): 97.7 (2025 04:47), Max: 98.1 (2025 15:43)  HR: 96 (2025 04:47) (77 - 100)  BP: 92/68 (2025 04:47) (89/62 - 153/103)  BP(mean): 73 (2025 13:22) (73 - 73)  RR: 18 (2025 04:47) (17 - 22)  SpO2: 98% (2025 04:47) (96% - 99%)    Parameters below as of 2025 04:47  Patient On (Oxygen Delivery Method): nasal cannula  O2 Flow (L/min): 2      CONSTITUTIONAL: NAD  HEET: MMM, EOMI, PERRLA  NECK: supple  RESPIRATORY: Normal respiratory effort; lungs are clear to auscultation bilaterally  CARDIOVASCULAR: Regular rate and rhythm, normal S1 and S2, no murmur/rub/gallop; No lower extremity edema; Peripheral pulses are 2+ bilaterally  ABDOMEN: Nontender to palpation, normoactive bowel sounds, no rebound/guarding; No hepatosplenomegaly  MUSCULOSKELETAL: no clubbing or cyanosis of digits; no joint swelling or tenderness to palpation  PSYCH: A+O to person, place, and time; affect appropriate  SKIN: No rash    LABS:                        13.7   8.36  )-----------( 317      ( 2025 12:35 )             43.4     04-02    138  |  104  |  6[L]  ----------------------------<  84  3.9   |  17[L]  |  0.82    Ca    8.9      2025 12:35    TPro  7.3  /  Alb  3.8  /  TBili  0.5  /  DBili  x   /  AST  16  /  ALT  10  /  AlkPhos  64  04-02    PT/INR - ( 2025 13:35 )   PT: 13.0 sec;   INR: 1.14 ratio         PTT - ( 2025 14:27 )  PTT:36.8 sec      Urinalysis Basic - ( 2025 15:13 )    Color: Yellow / Appearance: Clear / S.017 / pH: x  Gluc: x / Ketone: Trace mg/dL  / Bili: Negative / Urobili: 1.0 mg/dL   Blood: x / Protein: Negative mg/dL / Nitrite: Negative   Leuk Esterase: Negative / RBC: x / WBC x   Sq Epi: x / Non Sq Epi: x / Bacteria: x        Urinalysis with Rflx Culture (collected 2025 15:13)        Tele Reviewed:    RADIOLOGY & ADDITIONAL TESTS:  Results Reviewed:   Imaging Personally Reviewed:  Electrocardiogram Personally Reviewed:     PROGRESS NOTE:   Authored by Dr. iRvera Carter MD     Patient is a 44y old  Female who presents with a chief complaint of     SUBJECTIVE / OVERNIGHT EVENTS:  No acute events overnight.   Seen at bedside and is doing well. No pain, agreeable to I&D, and signed consent for genetic testing       MEDICATIONS  (STANDING):  buDESOnide    Inhalation Suspension 0.5 milliGRAM(s) Inhalation daily  buMETAnide 1 milliGRAM(s) Oral daily  influenza   Vaccine 0.5 milliLiter(s) IntraMuscular once  linezolid  IVPB 600 milliGRAM(s) IV Intermittent every 12 hours  montelukast 10 milliGRAM(s) Oral daily  pantoprazole    Tablet 40 milliGRAM(s) Oral before breakfast  sildenafil (REVATIO) 20 milliGRAM(s) Oral every 8 hours  spironolactone 50 milliGRAM(s) Oral two times a day    MEDICATIONS  (PRN):  acetaminophen     Tablet .. 1000 milliGRAM(s) Oral every 6 hours PRN Mild Pain (1 - 3), Moderate Pain (4 - 6)  albuterol    90 MICROgram(s) HFA Inhaler 2 Puff(s) Inhalation every 6 hours PRN Shortness of Breath  albuterol/ipratropium for Nebulization 3 milliLiter(s) Nebulizer every 6 hours PRN Shortness of Breath  HYDROmorphone  Injectable 0.5 milliGRAM(s) IV Push every 4 hours PRN Severe Pain (7 - 10)      CAPILLARY BLOOD GLUCOSE        I&O's Summary      PHYSICAL EXAM:  Vital Signs Last 24 Hrs  T(C): 36.5 (2025 04:47), Max: 36.7 (2025 15:43)  T(F): 97.7 (2025 04:47), Max: 98.1 (2025 15:43)  HR: 96 (2025 04:47) (77 - 100)  BP: 92/68 (2025 04:47) (89/62 - 153/103)  BP(mean): 73 (2025 13:22) (73 - 73)  RR: 18 (2025 04:47) (17 - 22)  SpO2: 98% (2025 04:47) (96% - 99%)    Parameters below as of 2025 04:47  Patient On (Oxygen Delivery Method): nasal cannula  O2 Flow (L/min): 2      CONSTITUTIONAL: NAD  HEET: MMM, EOMI, PERRLA  NECK: supple  RESPIRATORY: Normal respiratory effort; lungs are clear to auscultation bilaterally  CARDIOVASCULAR: Regular rate and rhythm, normal S1 and S2, no murmur/rub/gallop; No lower extremity edema; Peripheral pulses are 2+ bilaterally  ABDOMEN: Nontender to palpation, normoactive bowel sounds, no rebound/guarding; No hepatosplenomegaly  MUSCULOSKELETAL: no clubbing or cyanosis of digits; no joint swelling or tenderness to palpation  PSYCH: A+O to person, place, and time; affect appropriate  SKIN: No rash    LABS:                        13.7   8.36  )-----------( 317      ( 2025 12:35 )             43.4     04-02    138  |  104  |  6[L]  ----------------------------<  84  3.9   |  17[L]  |  0.82    Ca    8.9      2025 12:35    TPro  7.3  /  Alb  3.8  /  TBili  0.5  /  DBili  x   /  AST  16  /  ALT  10  /  AlkPhos  64  04-02    PT/INR - ( 2025 13:35 )   PT: 13.0 sec;   INR: 1.14 ratio         PTT - ( 2025 14:27 )  PTT:36.8 sec      Urinalysis Basic - ( 2025 15:13 )    Color: Yellow / Appearance: Clear / S.017 / pH: x  Gluc: x / Ketone: Trace mg/dL  / Bili: Negative / Urobili: 1.0 mg/dL   Blood: x / Protein: Negative mg/dL / Nitrite: Negative   Leuk Esterase: Negative / RBC: x / WBC x   Sq Epi: x / Non Sq Epi: x / Bacteria: x        Urinalysis with Rflx Culture (collected 2025 15:13)        Tele Reviewed:    RADIOLOGY & ADDITIONAL TESTS:  Results Reviewed:   Imaging Personally Reviewed:  Electrocardiogram Personally Reviewed:

## 2025-04-03 NOTE — PROGRESS NOTE ADULT - PROBLEM SELECTOR PLAN 2
At home takes Atrovert nebulizer, spiriva, pulmicort?    PLAN  - continue here as therapeutic interchange atrovent nebulizer ipratropium PRM   - continue home equivalent pulmicort, spiriva, PRN nebs  - continue flonase  - continue on baseline 2L O2   - continue to watch respiratory status At home takes Atrovert nebulizer, spiriva, pulmicort?  Pulmonary medicine is following     PLAN  - continue therapeutic interchange atrovent nebulizer ipratropium PRN   - continue home equivalent pulmicort, spiriva, PRN nebs  - continue flonase  - continue on baseline 2L O2   - continue to watch respiratory status

## 2025-04-03 NOTE — CONSULT NOTE ADULT - ASSESSMENT
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 was admitted to [facility] 10/13/24-10/29/24 for pre-transplant expedited workup. Sent to ED 4/2/25 from MD for abdominal abscess    #MRSA Abdominal Wall Abscess    #Pre-Lung Transplant Evaluation  COVID19 Nucleocapsid Antibody Negative  COVID19 Rajinder Antibody Positive  HAV IgG Negative  HBVs Ab Negative  HBVsAg Negative  HBVc Ab Negative  HCV Ab Negative  HSV 1 IgG Positive  HSV 2 IgG Negative  EBV IgG Positive  CMV IgG Positive  VZV IgG Positive  Measles IgG Positive  Mumps IgG Positive  Rubella IgG Positive  Quantiferon Gold Negative  HIV Ag/Ab by CMIA Negative  Syphilis Screen Negative  Toxoplasma IgG PENDING  Strongyloides Ab Negative  Trypanosoma cruzi Ab Negative    #Encounter to Vaccinate Patient  COVID19: Would benefit from COVID19 [id number] Vaccine Dose  Influenza: needs this , can be given at pulmonology office, deferring today not to give 2 reactogenic vaccines  RSV: Arexvy 3/6/25  Pneumococcal: States she had pneumococcal vaccination ~2022  HAV: Would benefit from Havrix  HBV: Heplisav Dose 1 3/6/25. Dose 2 of Heplisav prior to discharge  MMR: Rubella IgG pending. Mumps and Measles immune  Varicella: Immune will not require further vaccination  Shingles: Will require Shingrix  Tdap: Will require Tdap 43 y/o female, PMH pHTN (on treprostinil infusion), HF (s/p PPM dual chamber 2016), chronic PE (dx 2017) on DOAC, SVT (s/p ablation 05/2020), asthma, h/o MRSA abdominal wall abscesses last 2/'25, and JULIA. was admitted to [facility] 10/13/24-10/29/24 for pre-transplant expedited workup. Sent to ED 4/2/25 from MD for abdominal abscess    CXR (4/2) Perihilar opacities, likely represents enlarged pulmonary arteries. Otherwise clear lungs.    CT A/P (4/2) Peripherally enhancing lesion in the right mid abdominal wall subjacent to diffuse skin thickening measuring 5.0 x 2.1 x 4.0 cm consistent with phlegmon/abscess. Underlying mass is not excluded. Superficial skin thickening in the left abdominal wall at the level of   the umbilicus. Underlying small abscess measures 8 mm.    #MRSA Abdominal Wall Abscess  --Surgical drainage per surgery team. Would send bacterial cultures  --Continue Linezolid 600 BID. 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)  --Continue to follow CBC with diff  --Continue to follow temperature curve  --Follow up on preliminary blood cultures    #Pre-Lung Transplant Evaluation  COVID19 Nucleocapsid Antibody Negative  COVID19 Rajinder Antibody Positive  HAV IgG Negative  HBVs Ab Negative  HBVsAg Negative  HBVc Ab Negative  HCV Ab Negative  HSV 1 IgG Positive  HSV 2 IgG Negative  EBV IgG Positive  CMV IgG Positive  VZV IgG Positive  Measles IgG Positive  Mumps IgG Positive  Rubella IgG Positive  Quantiferon Gold Negative  HIV Ag/Ab by CMIA Negative  Syphilis Screen Negative  Toxoplasma IgG Negative  Strongyloides Ab Negative  Trypanosoma cruzi Ab Negative  --ID clearance for lung transplant pending treatment of abdominal wall abscess    #Encounter to Vaccinate Patient  COVID19: Would benefit from COVID19 [id number] Vaccine Dose  Influenza: needs this , can be given at pulmonology office, deferring today not to give 2 reactogenic vaccines  RSV: Arexvy 3/6/25  Pneumococcal: States she had pneumococcal vaccination ~2022  HAV: Would benefit from Havrix  HBV: Heplisav Dose 1 3/6/25. Dose 2 of Heplisav prior to discharge  MMR: Rubella IgG pending. Mumps and Measles immune  Varicella: Immune will not require further vaccination  Shingles: Will require Shingrix  Tdap: Will require Tdap    I will continue to follow. Please feel free to contact me with any further questions.    Rogelio Gonsales M.D.  [facility] Division of Infectious Disease  8AM-5PM Monday - Friday: Available on [employer] Teams  After Hours and Holidays (or if no response on [employer] Teams): Please contact the Infectious Diseases Office at [phone]    The above assessment and plan were discussed with Transplant Pulm team

## 2025-04-03 NOTE — PROGRESS NOTE ADULT - ASSESSMENT
44F w/ PMH of pHTN on Remodulin sq, sildenafil, UE DVT on Xarelto, Asthma, CHF  She is also following with transplant pulmonary and is finalizing pre-transplant evaluation.  Comes to the ED with concern with concern for abdominal abscesses. She was treated for this in the outpatient setting with doxycycline for the past 5 days without improvement.  Now being admitted for I&D and likely IV abx.  Skin lesions first started about 2 weeks ago. No fevers, chills, however local tenderness.    # pHTN  Patient has a known history of PAH on sildenfail, remodulin, diuretics. Also one O2 at home, 2-3lpm.   Appears stable from baseline.  - continue home sildenafil 20mg TID  - continue home Remodulin 51ng/kg/min on home pump  - continue home bumex, aldactone  - monitor K, Na, Mg  - I&D performed today  - in case of major surgery plans, please let pulmonary know, as patient might need adjustment to these medications  - no inpatient pulmonary plans    # asthma  Patient with known asthma, last received Fasenra on 4/1. PFTs from 1/2025 w/ severe obstruction, low DLCO.  - continue home equivalent pulmicort, spiriva, PRN atrovent nebs  - continue flonase

## 2025-04-03 NOTE — PROCEDURE NOTE - SUPERVISORY STATEMENT
Risks, benefits and alternatives of I&D discussed. I explained that some of the skin over the site is threatened. We will monitor the wound over the next few days. May require reexploration and possible debridement in OR if she continues to have evidence of infection or skin necrosis. Recommend ID evaluation for recurrent infections and in setting of foreign body/infusion pump.

## 2025-04-03 NOTE — PROGRESS NOTE ADULT - ASSESSMENT
44y F , with PMH of pHTN (on treprostinil infusion still continuing ), Right   Heart failure(s/p PPM dual chamber ), chronic PE (dx ) on DOAC, SVT (s/p ablation 2020), asthma, h/o MRSA abdominal wall abscesses last , and JULIA presenting for treatement of new abdominal abscess  44y F , with PMH of pHTN (on treprostinil infusion still continuing ), Right   Heart failure(s/p PPM dual chamber ), chronic PE (dx ) on DOAC, SVT (s/p ablation 2020), asthma, h/o MRSA abdominal wall abscesses last , and JULIA presenting for treatement of new abdominal abscess pending I&D with surgery and ID consult for ongoing management

## 2025-04-03 NOTE — PROGRESS NOTE ADULT - PROBLEM SELECTOR PLAN 5
2/2 to pulmonary hypertension   Seen by dr Yi outpatient     PLAN  - Hold home nifedipine   - continue home bumex and aldactone per pulm 2/2 to pulmonary hypertension   Seen by Dr Yoon outpatient       PLAN  - Hold home nifedipine for low BP  - continue home bumex and aldactone per pulm

## 2025-04-03 NOTE — PROGRESS NOTE ADULT - PROBLEM SELECTOR PLAN 1
Sent in by outside pulmonary doctor for concern that abscess was not resolving   Patient has Remodulin pump in the area of the abscess   Started two weeks ago and was started on doxycycline   Denies fevers, chills, vomiting, myalgias during this time period   Had chronic diarrhea and occasional vomiting as a known side effects of some of her medications   Not currently meeting SIRS criteria or septic   Patient is allergic to vancomycin     PLAN  - Linezolid 600 Q12 hours for now   - If patient begins to decompensate, can switch to Daptomycin 6mg/kg  - Blood cultures x2  - CT abdomen pelvis to   - Strep pneumo and legionella Sent in by outside pulmonary doctor for concern that abscess was not resolving   Patient has Remodulin pump in the area of the abscess   Started two weeks ago and was started on doxycycline   Denies fevers, chills, vomiting, myalgias during this time period   Had chronic diarrhea and occasional vomiting as a known side effects of some of her medications   Not currently meeting SIRS criteria or septic   Patient is allergic to vancomycin   - CT abdomen pelvis Peripherally enhancing lesion in the right mid abdominal wall subjacent to diffuse skin thickening measuring 5.0 x 2.1 x 4.0 cm consistent with phlegmon/abscess  Superficial skin thickening in the left abdominal wall at the level of the umbilicus. Underlying small abscess measures 8 mm.      PLAN  - Linezolid 600 Q12 hours for now   - If patient begins to decompensate, can switch to Daptomycin 6mg/kg  - Blood cultures x2 - pending

## 2025-04-03 NOTE — CHART NOTE - NSCHARTNOTEFT_GEN_A_CORE
Post Operative Check    Patient is post op from an incision and drainage of superficial abdominal wall abscess. Patient endorsing some pain after procedure.    Vitals    T(C): 36.5 (04-03-25 @ 11:12), Max: 36.7 (04-02-25 @ 21:03)  HR: 85 (04-03-25 @ 11:12) (77 - 96)  BP: 95/65 (04-03-25 @ 11:12) (89/62 - 112/78)  RR: 18 (04-03-25 @ 11:12) (18 - 18)  SpO2: 94% (04-03-25 @ 11:12) (94% - 98%)      04-03 @ 07:01  -  04-03 @ 19:50  --------------------------------------------------------  IN:    Oral Fluid: 840 mL  Total IN: 840 mL    OUT:  Total OUT: 0 mL    Total NET: 840 mL          Labs                        11.7   6.62  )-----------( 336      ( 03 Apr 2025 11:32 )             37.6       CBC Full  -  ( 03 Apr 2025 11:32 )  WBC Count : 6.62 K/uL  Hemoglobin : 11.7 g/dL  Hematocrit : 37.6 %  Platelet Count - Automated : 336 K/uL  Mean Cell Volume : 70.4 fl  Mean Cell Hemoglobin : 21.9 pg  Mean Cell Hemoglobin Concentration : 31.1 g/dL  Auto Neutrophil # : x  Auto Lymphocyte # : x  Auto Monocyte # : x  Auto Eosinophil # : x  Auto Basophil # : x  Auto Neutrophil % : x  Auto Lymphocyte % : x  Auto Monocyte % : x  Auto Eosinophil % : x  Auto Basophil % : x      Physical Exam  General: AAOx3, in some pain  Abdomen: Wound covered by dressing with some moderate strikethrough, appropriately tender to palpation, otherwise soft and nondistended      Patient is a 44y old Female s/p I&D of superficial abdominal wall abscess, progressing appropriately per postop course.    Plan:  - Reg diet  - Analgesia prn  - Dressing changes qAM  - Care per primary    ACS/Trauma  [id number]

## 2025-04-03 NOTE — CONSULT NOTE ADULT - SUBJECTIVE AND OBJECTIVE BOX
Patient is a 44y old  Female who presents with a chief complaint of     HPI:  44y F , with PMH of pHTN (on treprostinil infusion still continuing ), Right   Heart failure(s/p PPM dual chamber ), chronic PE (dx ) on DOAC, SVT (s/p ablation 2020), asthma, h/o MRSA abdominal wall abscesses last , and JULIA. was admitted after being sent in from outpatient pulmonologist. for concern of worsening abdominal abscess in proximity to medication pump that has been unresponsive to doxycyline. Per patient she started having abdominal pain and increased swelling two weeks ago. Went into her outpatient transplant ID doctor Dr Ash and was given a course of doxycyline yesterday she presented to her pulmonologist DR Yoon who was concerned about the size of the abscess and that it was not improving. She was sent in for further evaluation Patient denied cough, fevers, myalgias vomiting ot diarrhea during the time period of the abscess formations     ED course   Tmax 97.7, , /56 on 2L home   Not meeting SIRS criteria   Linezolid and Cefepime, Ofirmev, Morphine   (2025 16:47)     prior hospital charts reviewed [  ]  primary team notes reviewed [  ]  other consultant notes reviewed [  ]    PAST MEDICAL & SURGICAL HISTORY:  Anemia      Pulmonary hypertension      Asthma  On home O2 nightly at 2L via NC      PE (pulmonary thromboembolism)  on Xarelto 10 mg daily      Sinusitis      Right heart failure      H/O pulmonary hypertension      Pulmonary embolism      History of tachycardia      On supplemental oxygen by nasal cannula  O2 @ 2L      Pacemaker      Right atrial thrombus      Pacemaker      History of pacemaker   - [employer] model Ingevity 4469          Allergies  vancomycin (Rash)  penicillin (Rash)  adhesives (Rash)    ANTIMICROBIALS (past 90 days)  MEDICATIONS  (STANDING):  cefepime   IVPB   100 mL/Hr IV Intermittent (25 @ 12:42)    linezolid  IVPB   300 mL/Hr IV Intermittent (25 @ 15:01)    linezolid  IVPB   300 mL/Hr IV Intermittent (25 @ 03:15)      ANTIMICROBIALS:    linezolid  IVPB 600 every 12 hours    OTHER MEDS: MEDICATIONS  (STANDING):  acetaminophen     Tablet .. 1000 every 6 hours PRN  albuterol    90 MICROgram(s) HFA Inhaler 2 every 6 hours PRN  albuterol/ipratropium for Nebulization 3 every 6 hours PRN  buDESOnide    Inhalation Suspension 0.5 daily  buMETAnide 1 daily  HYDROmorphone  Injectable 0.5 every 4 hours PRN  influenza   Vaccine 0.5 once  montelukast 10 daily  pantoprazole    Tablet 40 before breakfast  sildenafil (REVATIO) 20 every 8 hours  spironolactone 50 two times a day    SOCIAL HISTORY:   hx smoking  non-smoker    FAMILY HISTORY:  Family history of diabetes mellitus  in brother      REVIEW OF SYSTEMS  [  ] ROS unobtainable because:    [  ] All other systems negative except as noted below:	    Constitutional:  [ ] fever [ ] chills  [ ] weight loss  [ ] weakness  Skin:  [ ] rash [ ] phlebitis	  Eyes: [ ] icterus [ ] pain  [ ] discharge	  ENMT: [ ] sore throat  [ ] thrush [ ] ulcers [ ] exudates  Respiratory: [ ] dyspnea [ ] hemoptysis [ ] cough [ ] sputum	  Cardiovascular:  [ ] chest pain [ ] palpitations [ ] edema	  Gastrointestinal:  [ ] nausea [ ] vomiting [ ] diarrhea [ ] constipation [ ] pain	  Genitourinary:  [ ] dysuria [ ] frequency [ ] hematuria [ ] discharge [ ] flank pain  [ ] incontinence  Musculoskeletal:  [ ] myalgias [ ] arthralgias [ ] arthritis  [ ] back pain  Neurological:  [ ] headache [ ] seizures  [ ] confusion/altered mental status  Psychiatric:  [ ] anxiety [ ] depression	  Hematology/Lymphatics:  [ ] lymphadenopathy  Endocrine:  [ ] adrenal [ ] thyroid  Allergic/Immunologic:	 [ ] transplant [ ] seasonal    Vital Signs Last 24 Hrs  T(F): 97.7 (25 @ 04:47), Max: 98.1 (25 @ 15:43)  Vital Signs Last 24 Hrs  HR: 96 (25 @ 04:47) (77 - 100)  BP: 92/68 (25 @ 04:47) (89/62 - 153/103)  RR: 18 (25 @ 04:47)  SpO2: 98% (25 @ 04:47) (96% - 99%)  Wt(kg): --    PHYSICAL EXAMINATION:  General: Alert and Awake, NAD  HEENT: PERRL, EOMI, No subconjunctival hemorrhages, Oropharynx Clear, MMM  Neck: Supple, No PHOENIX  Cardiac: RRR, No M/R/G  Resp: CTAB, No Wh/Rh/Ra  Abdomen: NBS, NT/ND, No HSM, No rigidity or guarding  MSK: No LE edema. No stigmata of IE. No evidence of phlebitis. No evidence of synovitis.  : No lucas  Skin: No rashes or lesions. Skin is warm and dry to the touch.   Neuro: Alert and Awake. CN 2-12 Grossly intact. Moves all four extremities spontaneously.  Psych: Calm, Pleasant, Cooperative                          13.7   8.36  )-----------( 317      ( 2025 12:35 )             43.4     04-    138  |  104  |  6[L]  ----------------------------<  84  3.9   |  17[L]  |  0.82    Ca    8.9      2025 12:35    TPro  7.3  /  Alb  3.8  /  TBili  0.5  /  DBili  x   /  AST  16  /  ALT  10  /  AlkPhos  64  04-02    Urinalysis Basic - ( 2025 15:13 )    Color: Yellow / Appearance: Clear / S.017 / pH: x  Gluc: x / Ketone: Trace mg/dL  / Bili: Negative / Urobili: 1.0 mg/dL   Blood: x / Protein: Negative mg/dL / Nitrite: Negative   Leuk Esterase: Negative / RBC: x / WBC x   Sq Epi: x / Non Sq Epi: x / Bacteria: x    MICROBIOLOGY:  Urinalysis with Rflx Culture (collected 2025 15:13)            Toxoplasma IgG Screen: <3.00 IU/mL (10-25-24 @ 07:14)  HIV-1 RNA Quantitative, Viral Load Log: NOT DET. lg /mL (10-22-24 @ 17:39)  CMV IgG Antibody: >10.00 U/mL (10-22-24 @ 15:11)          RADIOLOGY:    <The imaging below has been reviewed and visualized by me independently. Findings as detailed in report below>     Patient is a 44y old  Female who presents with a chief complaint of     HPI:    44 year old female PMH pHTN (on treprostinil infusion still continuing ), Right   Heart failure(s/p PPM dual chamber ), chronic PE (dx ) on DOAC, SVT (s/p ablation 2020), asthma, h/o MRSA abdominal wall abscesses last , and JULIA. was admitted after being sent in from outpatient pulmonologist. for concern of worsening abdominal abscess in proximity to medication pump that has been unresponsive to doxycyline. Per patient she started having abdominal pain and increased swelling two weeks ago. Went into her outpatient transplant ID doctor Dr Ash and was given a course of doxycyline yesterday she presented to her pulmonologist DR Yoon who was concerned about the size of the abscess and that it was not improving. She was sent in for further evaluation Patient denied cough, fevers, myalgias vomiting ot diarrhea during the time period of the abscess formations     ED course   Tmax 97.7, , /56 on 2L home   Not meeting SIRS criteria   Linezolid and Cefepime, Ofirmev, Morphine   (2025 16:47)     CXR () Perihilar opacities, likely represents enlarged pulmonary arteries. Otherwise clear lungs.  CT A/P () Peripherally enhancing lesion in the right mid abdominal wall subjacent to diffuse skin thickening measuring 5.0 x 2.1 x 4.0 cm consistent with phlegmon/abscess. Underlying mass is not excluded. Superficial skin thickening in the left abdominal wall at the level of   the umbilicus. Underlying small abscess measures 8 mm.    prior hospital charts reviewed [  ]  primary team notes reviewed [ x ]  other consultant notes reviewed [ x ]    PAST MEDICAL & SURGICAL HISTORY:  Anemia      Pulmonary hypertension      Asthma  On home O2 nightly at 2L via NC      PE (pulmonary thromboembolism)  on Xarelto 10 mg daily      Sinusitis      Right heart failure      H/O pulmonary hypertension      Pulmonary embolism      History of tachycardia      On supplemental oxygen by nasal cannula  O2 @ 2L      Pacemaker      Right atrial thrombus      Pacemaker      History of pacemaker   - [employer] model Ingevity 4469          Allergies  vancomycin (Rash)  penicillin (Rash)  adhesives (Rash)    ANTIMICROBIALS (past 90 days)  MEDICATIONS  (STANDING):  cefepime   IVPB   100 mL/Hr IV Intermittent (25 @ 12:42)    linezolid  IVPB   300 mL/Hr IV Intermittent (25 @ 15:01)    linezolid  IVPB   300 mL/Hr IV Intermittent (25 @ 03:15)      ANTIMICROBIALS:    linezolid  IVPB 600 every 12 hours    OTHER MEDS: MEDICATIONS  (STANDING):  acetaminophen     Tablet .. 1000 every 6 hours PRN  albuterol    90 MICROgram(s) HFA Inhaler 2 every 6 hours PRN  albuterol/ipratropium for Nebulization 3 every 6 hours PRN  buDESOnide    Inhalation Suspension 0.5 daily  buMETAnide 1 daily  HYDROmorphone  Injectable 0.5 every 4 hours PRN  influenza   Vaccine 0.5 once  montelukast 10 daily  pantoprazole    Tablet 40 before breakfast  sildenafil (REVATIO) 20 every 8 hours  spironolactone 50 two times a day    SOCIAL HISTORY: no smoking or etoh use    FAMILY HISTORY:  Family history of diabetes mellitus  in brother      REVIEW OF SYSTEMS  [  ] ROS unobtainable because:    [ x ] All other systems negative except as noted below:	    Constitutional:  [ ] fever [ ] chills  [ ] weight loss  [ ] weakness  Skin:  [x ] rash [ ] phlebitis	  Eyes: [ ] icterus [ ] pain  [ ] discharge	  ENMT: [ ] sore throat  [ ] thrush [ ] ulcers [ ] exudates  Respiratory: [ ] dyspnea [ ] hemoptysis [ ] cough [ ] sputum	  Cardiovascular:  [ ] chest pain [ ] palpitations [ ] edema	  Gastrointestinal:  [ ] nausea [ ] vomiting [ ] diarrhea [ ] constipation [ ] pain	  Genitourinary:  [ ] dysuria [ ] frequency [ ] hematuria [ ] discharge [ ] flank pain  [ ] incontinence  Musculoskeletal:  [ ] myalgias [ ] arthralgias [ ] arthritis  [ ] back pain  Neurological:  [ ] headache [ ] seizures  [ ] confusion/altered mental status  Psychiatric:  [ ] anxiety [ ] depression	  Hematology/Lymphatics:  [ ] lymphadenopathy  Endocrine:  [ ] adrenal [ ] thyroid  Allergic/Immunologic:	 [ ] transplant [ ] seasonal    Vital Signs Last 24 Hrs  T(F): 97.7 (25 @ 04:47), Max: 98.1 (25 @ 15:43)  Vital Signs Last 24 Hrs  HR: 96 (25 @ 04:47) (77 - 100)  BP: 92/68 (25 @ 04:47) (89/62 - 153/103)  RR: 18 (25 @ 04:47)  SpO2: 98% (25 @ 04:47) (96% - 99%)  Wt(kg): --    PHYSICAL EXAMINATION:  General: Alert and Awake, NAD  HEENT: PERRL, EOMI, No subconjunctival hemorrhages, Oropharynx Clear, MMM  Neck: Supple, No PHOENIX  Cardiac: RRR, No M/R/G  Resp: CTAB, No Wh/Rh/Ra  Abdomen: +left sided infusion pump (no induration around that site). Right sided large abdominal wall abscess with significant associated tenderness to palpation, NBS, No HSM, No rigidity or guarding  MSK: No LE edema. No stigmata of IE. No evidence of phlebitis. No evidence of synovitis.  : No lucas  Skin: As per abdominal section above. Skin is warm and dry to the touch.   Neuro: Alert and Awake. CN 2-12 Grossly intact. Moves all four extremities spontaneously.  Psych: Calm, Pleasant, Cooperative                          13.7   8.36  )-----------( 317      ( 2025 12:35 )             43.4     04-    138  |  104  |  6[L]  ----------------------------<  84  3.9   |  17[L]  |  0.82    Ca    8.9      2025 12:35    TPro  7.3  /  Alb  3.8  /  TBili  0.5  /  DBili  x   /  AST  16  /  ALT  10  /  AlkPhos  64  04-02    Urinalysis Basic - ( 2025 15:13 )    Color: Yellow / Appearance: Clear / S.017 / pH: x  Gluc: x / Ketone: Trace mg/dL  / Bili: Negative / Urobili: 1.0 mg/dL   Blood: x / Protein: Negative mg/dL / Nitrite: Negative   Leuk Esterase: Negative / RBC: x / WBC x   Sq Epi: x / Non Sq Epi: x / Bacteria: x    MICROBIOLOGY:    Urinalysis with Rflx Culture (collected 2025 15:13)    Toxoplasma IgG Screen: <3.00 IU/mL (10-25-24 @ 07:14)  HIV-1 RNA Quantitative, Viral Load Log: NOT DET. lg /mL (10-22-24 @ 17:39)  CMV IgG Antibody: >10.00 U/mL (10-22-24 @ 15:11)    RADIOLOGY:    <The imaging below has been reviewed and visualized by me independently. Findings as detailed in report below>    < from: CT Abdomen and Pelvis w/ IV Cont (25 @ 22:08) >  IMPRESSION:  Peripherally enhancing lesion in the right mid abdominal wall subjacent   to diffuse skin thickening measuring 5.0 x 2.1 x 4.0 cm consistent with   phlegmon/abscess. Underlying mass is not excluded.    Superficial skin thickening in the left abdominal wall at the level of   the umbilicus. Underlying small abscess measures 8 mm.    Groundglass opacity at the lung bases consistent with     < end of copied text >

## 2025-04-03 NOTE — PROGRESS NOTE ADULT - SUBJECTIVE AND OBJECTIVE BOX
CHIEF COMPLAINT:    Interval Events:    For bedside I&D today    REVIEW OF SYSTEMS:  [ x ] All other systems negative  [ ] Unable to assess ROS because ________    OBJECTIVE:  ICU Vital Signs Last 24 Hrs  T(C): 36.5 (03 Apr 2025 11:12), Max: 36.7 (02 Apr 2025 21:03)  T(F): 97.7 (03 Apr 2025 11:12), Max: 98 (02 Apr 2025 21:03)  HR: 85 (03 Apr 2025 11:12) (77 - 96)  BP: 95/65 (03 Apr 2025 11:12) (89/62 - 112/78)  BP(mean): --  ABP: --  ABP(mean): --  RR: 18 (03 Apr 2025 11:12) (18 - 18)  SpO2: 94% (03 Apr 2025 11:12) (94% - 98%)    O2 Parameters below as of 03 Apr 2025 11:12  Patient On (Oxygen Delivery Method): nasal cannula  O2 Flow (L/min): 2            04-03 @ 07:01  -  04-03 @ 17:45  --------------------------------------------------------  IN: 840 mL / OUT: 0 mL / NET: 840 mL      CAPILLARY BLOOD GLUCOSE          PHYSICAL EXAM:  General: NAD  HEENT: Normal sclera  Lymph Nodes: No LAD  Respiratory: CTABL  Cardiovascular: Regular rate and rhythm no m/r/g  Abdomen: Nontender nondistended  Extremities: No peripheral edema  Skin: No rashes  Neurological: No appreciable neurologic deficits  Psychiatry: Appropriate mood and affect    HOSPITAL MEDICATIONS:  MEDICATIONS  (STANDING):  buDESOnide    Inhalation Suspension 0.5 milliGRAM(s) Inhalation daily  buMETAnide 1 milliGRAM(s) Oral daily  fluticasone propionate 50 MICROgram(s)/spray Nasal Spray 1 Spray(s) Both Nostrils two times a day  influenza   Vaccine 0.5 milliLiter(s) IntraMuscular once  lidocaine 1%/epinephrine 1:100,000 Inj 20 milliLiter(s) Local Injection once  linezolid  IVPB 600 milliGRAM(s) IV Intermittent every 12 hours  montelukast 10 milliGRAM(s) Oral daily  pantoprazole    Tablet 40 milliGRAM(s) Oral before breakfast  sildenafil (REVATIO) 20 milliGRAM(s) Oral every 8 hours  spironolactone 50 milliGRAM(s) Oral two times a day    MEDICATIONS  (PRN):  acetaminophen     Tablet .. 1000 milliGRAM(s) Oral every 6 hours PRN Mild Pain (1 - 3), Moderate Pain (4 - 6)  albuterol    90 MICROgram(s) HFA Inhaler 2 Puff(s) Inhalation every 6 hours PRN Shortness of Breath  albuterol/ipratropium for Nebulization 3 milliLiter(s) Nebulizer every 6 hours PRN Shortness of Breath  HYDROmorphone  Injectable 0.5 milliGRAM(s) IV Push every 4 hours PRN Severe Pain (7 - 10)      LABS:                        11.7   6.62  )-----------( 336      ( 03 Apr 2025 11:32 )             37.6     Hgb Trend: 11.7<--, 13.7<--  04-03    135  |  103  |  5[L]  ----------------------------<  77  3.9   |  20[L]  |  0.87    Ca    8.6      03 Apr 2025 11:32  Phos  3.9     04-03  Mg     1.8     04-03    TPro  7.3  /  Alb  3.8  /  TBili  0.5  /  DBili  x   /  AST  16  /  ALT  10  /  AlkPhos  64  04-02    Creatinine Trend: 0.87<--, 0.82<--  PT/INR - ( 02 Apr 2025 13:35 )   PT: 13.0 sec;   INR: 1.14 ratio         PTT - ( 02 Apr 2025 14:27 )  PTT:36.8 sec  Urinalysis Basic - ( 03 Apr 2025 11:32 )    Color: x / Appearance: x / SG: x / pH: x  Gluc: 77 mg/dL / Ketone: x  / Bili: x / Urobili: x   Blood: x / Protein: x / Nitrite: x   Leuk Esterase: x / RBC: x / WBC x   Sq Epi: x / Non Sq Epi: x / Bacteria: x        Venous Blood Gas:  04-03 @ 10:45  7.36/42/56/24/85.9  VBG Lactate: 1.0  Venous Blood Gas:  04-02 @ 14:27  7.35/44/40/24/61.3  VBG Lactate: 0.7      MICROBIOLOGY:     Urinalysis with Rflx Culture (collected 02 Apr 2025 15:13)        RADIOLOGY:  [ x ] Reviewed and interpreted by me    PULMONARY FUNCTION TESTS:    EKG:

## 2025-04-03 NOTE — PROGRESS NOTE ADULT - PROBLEM SELECTOR PLAN 4
On Xarelto 15 outpatient for previus DVT     PLAN  - hold for now pending surgery Eval On Xarelto 15 outpatient for previus DVT     PLAN  - hold for now pending surgery Eval  - restart after I&D pending

## 2025-04-03 NOTE — PROCEDURAL SAFETY CHECKLIST WITH OR WITHOUT SEDATION - VERIFY POSITION
Subjective:       Patient ID: Nitin Mcconnell is a 45 y.o. female.    Chief Complaint: Lupus and gout    HPI:  Nitin Mcconnell is a 45 y.o. female diagnosed at age 31 with lupus.  Started as upper respiratory symptoms that did not respond to antibiotics.  She had fever and low BP as well as SOB.  She was intubated and on ventilator for a week.  She was at [facility].  Dr. Martin diagnosed lupus.  Hospitalized 3/8/07 to 3/30/07.  She had elevated pressure in eyes, kidney involvement.  She was discharged with a PICC line for antibiotics.  She was found to be allergic to heparin due to thrombocytopenia that developed after flushing PICC line with heparin.  She had hair loss.  Had enlarged lymph node under arm in past and biopsy was normal.  Was treated with Plaquenil and steroids.  Only took steroid for 1 year.     Since last visit saw derm.  They did biopsy, gave hair vitamin, daily hail oil and hair foam.   She is awaiting biopsy result.   No joint pain currently.  She denies any flares since last visit.  Ulcer in mouth two months ago.  Being evaluated for sleep apnea.  No gout attacks.   Restarting exercise 4-5 days a week for 30 minutes on treadmill or elliptical.   Still with swelling in ankles for over 2 years.   Diagnosed with lymph edema and will start therapy.          Lupus Review of Systems  Alopecia: yes  Photosensitivity: no   Raynaud's: no  Oral or nasal ulcers: occasional oral ulcers  Rashes:  Intermittent malar rash; occasional rash in crease of arm and neck  No pleurisy or pericarditis.  No seizures, psychosis, or stroke.  No venous or arterial clots.  History of being on coumadin for 3 months as a precaution.   Pregnancy hx (if applicable): no miscarriages (never pregnant)        Review of Systems   Constitutional: Negative.  Negative for fever and unexpected weight change.   HENT: Negative.  Negative for mouth sores and trouble swallowing.    Eyes: Negative for redness.   Respiratory:  Negative for cough and shortness of breath.    Cardiovascular: Positive for leg swelling. Negative for chest pain.   Gastrointestinal: Negative for constipation and diarrhea.   Endocrine: Negative.    Genitourinary: Negative.  Negative for dysuria and genital sores.   Musculoskeletal: Negative.    Skin: Negative for rash.        Alopecia     Allergic/Immunologic: Negative.    Neurological: Negative.  Negative for headaches.   Hematological: Negative.  Does not bruise/bleed easily.   Psychiatric/Behavioral: Negative.          Objective:   LMP 07/13/2013      Physical Exam   Constitutional: She is oriented to person, place, and time and well-developed, well-nourished, and in no distress.   HENT:   Head: Normocephalic and atraumatic.   Eyes: Conjunctivae and EOM are normal.   Neurological: She is alert and oriented to person, place, and time.   Psychiatric: Mood and affect normal.            LABS    Component      Latest Ref Rng & Units 9/19/2020 6/2/2020   WBC      3.90 - 12.70 K/uL 4.98 4.51   RBC      4.00 - 5.40 M/uL 4.62 4.58   Hemoglobin      12.0 - 16.0 g/dL 12.7 12.4   Hematocrit      37.0 - 48.5 % 42.1 40.3   MCV      82 - 98 fL 91 88   MCH      27.0 - 31.0 pg 27.5 27.1   MCHC      32.0 - 36.0 g/dL 30.2 (L) 30.8 (L)   RDW      11.5 - 14.5 % 12.9 12.5   Platelets      150 - 350 K/uL 218 250   MPV      9.2 - 12.9 fL 11.7 10.9   Immature Granulocytes      0.0 - 0.5 % 0.2 0.2   Gran # (ANC)      1.8 - 7.7 K/uL 2.9 2.0   Immature Grans (Abs)      0.00 - 0.04 K/uL 0.01 0.01   Lymph #      1.0 - 4.8 K/uL 1.4 1.9   Mono #      0.3 - 1.0 K/uL 0.5 0.5   Eos #      0.0 - 0.5 K/uL 0.1 0.1   Baso #      0.00 - 0.20 K/uL 0.04 0.05   nRBC      0 /100 WBC 0 0   Gran %      38.0 - 73.0 % 58.7 43.9   Lymph %      18.0 - 48.0 % 28.9 41.5   Mono %      4.0 - 15.0 % 10.2 11.1   Eosinophil %      0.0 - 8.0 % 1.2 2.2   Basophil %      0.0 - 1.9 % 0.8 1.1   Differential Method       Automated Automated   Sodium      136 - 145 mmol/L  done 139 137   Potassium      3.5 - 5.1 mmol/L 4.1 3.9   Chloride      95 - 110 mmol/L 102 100   CO2      23 - 29 mmol/L 27 27   Glucose      70 - 110 mg/dL 86 76   BUN      6 - 20 mg/dL 17 22 (H)   Creatinine      0.5 - 1.4 mg/dL 1.0 0.93   Calcium      8.7 - 10.5 mg/dL 8.9 9.3   PROTEIN TOTAL      6.0 - 8.4 g/dL 7.5 7.9   Albumin      3.5 - 5.2 g/dL 4.0 4.4   BILIRUBIN TOTAL      0.1 - 1.0 mg/dL 0.5 0.6   Alkaline Phosphatase      55 - 135 U/L 73 73   AST      10 - 40 U/L 30 36   ALT      10 - 44 U/L 40 34   Anion Gap      8 - 16 mmol/L 10 10   eGFR if African American      >60 mL/min/1.73 m:2 >60.0 >60.0   eGFR if non African American      >60 mL/min/1.73 m:2 >60.0 >60.0   Specimen UA       Urine, Unspecified Urine, Clean Catch   Color, UA      Yellow, Straw, Annabelle Yellow Yellow   Appearance, UA      Clear Hazy (A) Clear   pH, UA      5.0 - 8.0 5.0 5.0   Specific Gravity, UA      1.005 - 1.030 1.025 1.020   Protein, UA      Negative Negative Negative   Glucose, UA      Negative Negative Negative   Ketones, UA      Negative Negative Negative   Bilirubin (UA)      Negative Negative Negative   Occult Blood UA      Negative Negative Negative   NITRITE UA      Negative Negative Negative   UROBILINOGEN UA      <2.0 EU/dL  Negative   Leukocytes, UA      Negative 1+ (A) 1+ (A)   Cholesterol      120 - 199 mg/dL 178    Triglycerides      30 - 150 mg/dL 100    HDL      40 - 75 mg/dL 54    LDL Cholesterol External      63.0 - 159.0 mg/dL 104.0    HDL/Cholesterol Ratio      20.0 - 50.0 % 30.3    Total Cholesterol/HDL Ratio      2.0 - 5.0 3.3    Non-HDL Cholesterol      mg/dL 124    Iron      30 - 160 ug/dL 95    Transferrin      200 - 375 mg/dL 243    TIBC      250 - 450 ug/dL 360    Saturated Iron      20 - 50 % 26    RBC, UA      0 - 4 /hpf 0    WBC, UA      0 - 5 /hpf 2 6 (H)   Squam Epithel, UA      /hpf 6    Microscopic Comment       SEE COMMENT SEE COMMENT   Protein, Urine Random      0 - 15 mg/dL  <7   Creatinine, Urine       15.0 - 325.0 mg/dL  114.3   Prot/Creat Ratio, Urine      0.00 - 0.20  Unable to calculate   Hemoglobin A1C External      4.0 - 5.6 % 5.0    Estimated Avg Glucose      68 - 131 mg/dL 97    CCP Antibodies      <5.0 U/mL  <0.5   ds DNA Ab      Negative 1:10  Negative 1:10   Complement (C-3)      50 - 180 mg/dL  146   Complement (C-4)      11 - 44 mg/dL  47 (H)   CPK      55 - 170 U/L  149   Uric Acid      2.4 - 5.7 mg/dL  8.9 (H)   CRP      0.00 - 1.00 mg/dL  0.24   Sed Rate      0 - 20 mm/Hr  25 (H)   TSH      0.400 - 4.000 uIU/mL 1.833    Ferritin      20.0 - 300.0 ng/mL 294    Folate      4.0 - 24.0 ng/mL 14.2    Vitamin B-12      210 - 950 pg/mL 293    Vit D, 25-Hydroxy      30 - 96 ng/mL 35    Thiamine      38 - 122 ug/L 41      Assessment:       1.  SLE.  Stable  2.  Immunosuppression.  Eye exam for Plaquenil will have appointment today  3.  Elevated pressure in eyes  4.  Obesity.  Gastric bypass 2009.  Started exercising.  5.  Thyroid nodules.  Being monitored.  Aspiration was normal  6.  Mother with RA  7.  Ankle swelling bilateral.  Grandmother and mother had CHF    8.  Alopecia.  Family history of hair loss.   9.  Pulmonary HTN  10.  SOB intermittent  11.  Lymph edema.  Will start therapy  12.  Gout left 1st toe.  X-ray with possible erosion at first MTP reviewed by me. No gout attacks    Plan:       1.  Labs  2.  Will need repeat echo Feb 2021 for pulmonary HTN per Dr. Felton  3.  Follow with current dermatology to evaluate   4.  Patient to encouraged to reconsider Weight [employer]/Noom.  5.  Started on exercising.     6.  Speak with wellness dietician through insurance  7.  Medrol pack prn for gout attacks  8. NOOM for her to discuss with her nutrition  9. Will start therapy for lymph edema  10.  Patient had flu vaccination      RTO 3 months/prn;     The patient location is: home  The chief complaint leading to consultation is: lupus    Visit type: TELE AUDIOVISUAL:32549    Face to Face time with patient:  20 minutes  25 minutes of total time spent on the encounter, which includes face to face time and non-face to face time preparing to see the patient (eg, review of tests), Obtaining and/or reviewing separately obtained history, Documenting clinical information in the electronic or other health record, Independently interpreting results (not separately reported) and communicating results to the patient/family/caregiver, or Care coordination (not separately reported).         Each patient to whom he or she provides medical services by telemedicine is:  (1) informed of the relationship between the physician and patient and the respective role of any other health care provider with respect to management of the patient; and (2) notified that he or she may decline to receive medical services by telemedicine and may withdraw from such care at any time.    Notes: see above

## 2025-04-04 LAB
ANION GAP SERPL CALC-SCNC: 16 MMOL/L — SIGNIFICANT CHANGE UP (ref 5–17)
APCR PPP: 2.6 RATIO — SIGNIFICANT CHANGE UP
AT III ACT/NOR PPP CHRO: 76 % — LOW (ref 85–135)
BUN SERPL-MCNC: 7 MG/DL — SIGNIFICANT CHANGE UP (ref 7–23)
CALCIUM SERPL-MCNC: 8.3 MG/DL — LOW (ref 8.4–10.5)
CHLORIDE SERPL-SCNC: 101 MMOL/L — SIGNIFICANT CHANGE UP (ref 96–108)
CO2 SERPL-SCNC: 18 MMOL/L — LOW (ref 22–31)
CREAT SERPL-MCNC: 0.86 MG/DL — SIGNIFICANT CHANGE UP (ref 0.5–1.3)
DRVVT RATIO: 0.86 RATIO — SIGNIFICANT CHANGE UP (ref 0–1.21)
DRVVT SCREEN TO CONFIRM RATIO: SIGNIFICANT CHANGE UP
EGFR: 85 ML/MIN/1.73M2 — SIGNIFICANT CHANGE UP
EGFR: 85 ML/MIN/1.73M2 — SIGNIFICANT CHANGE UP
GLUCOSE SERPL-MCNC: 105 MG/DL — HIGH (ref 70–99)
GRAM STN FLD: ABNORMAL
HCT VFR BLD CALC: 36.9 % — SIGNIFICANT CHANGE UP (ref 34.5–45)
HGB BLD-MCNC: 11.3 G/DL — LOW (ref 11.5–15.5)
MAGNESIUM SERPL-MCNC: 2 MG/DL — SIGNIFICANT CHANGE UP (ref 1.6–2.6)
MCHC RBC-ENTMCNC: 22 PG — LOW (ref 27–34)
MCHC RBC-ENTMCNC: 30.6 G/DL — LOW (ref 32–36)
MCV RBC AUTO: 71.8 FL — LOW (ref 80–100)
NRBC BLD AUTO-RTO: 0 /100 WBCS — SIGNIFICANT CHANGE UP (ref 0–0)
PHOSPHATE SERPL-MCNC: 3.7 MG/DL — SIGNIFICANT CHANGE UP (ref 2.5–4.5)
PLATELET # BLD AUTO: 337 K/UL — SIGNIFICANT CHANGE UP (ref 150–400)
POTASSIUM SERPL-MCNC: 4.1 MMOL/L — SIGNIFICANT CHANGE UP (ref 3.5–5.3)
POTASSIUM SERPL-SCNC: 4.1 MMOL/L — SIGNIFICANT CHANGE UP (ref 3.5–5.3)
PROT C ACT/NOR PPP: 80 % — SIGNIFICANT CHANGE UP (ref 74–150)
RBC # BLD: 5.14 M/UL — SIGNIFICANT CHANGE UP (ref 3.8–5.2)
RBC # FLD: 17 % — HIGH (ref 10.3–14.5)
SODIUM SERPL-SCNC: 135 MMOL/L — SIGNIFICANT CHANGE UP (ref 135–145)
WBC # BLD: 7.55 K/UL — SIGNIFICANT CHANGE UP (ref 3.8–10.5)
WBC # FLD AUTO: 7.55 K/UL — SIGNIFICANT CHANGE UP (ref 3.8–10.5)

## 2025-04-04 PROCEDURE — 99233 SBSQ HOSP IP/OBS HIGH 50: CPT | Mod: GC

## 2025-04-04 PROCEDURE — 99232 SBSQ HOSP IP/OBS MODERATE 35: CPT

## 2025-04-04 RX ORDER — HYDROMORPHONE/SOD CHLOR,ISO/PF 2 MG/10 ML
0.5 SYRINGE (ML) INJECTION EVERY 4 HOURS
Refills: 0 | Status: DISCONTINUED | OUTPATIENT
Start: 2025-04-04 | End: 2025-04-07

## 2025-04-04 RX ORDER — RIVAROXABAN 10 MG/1
15 TABLET, FILM COATED ORAL
Refills: 0 | Status: DISCONTINUED | OUTPATIENT
Start: 2025-04-04 | End: 2025-04-12

## 2025-04-04 RX ORDER — HYDROMORPHONE/SOD CHLOR,ISO/PF 2 MG/10 ML
0.5 SYRINGE (ML) INJECTION ONCE
Refills: 0 | Status: DISCONTINUED | OUTPATIENT
Start: 2025-04-04 | End: 2025-04-04

## 2025-04-04 RX ADMIN — Medication 0.5 MILLIGRAM(S): at 06:50

## 2025-04-04 RX ADMIN — Medication 0.5 MILLIGRAM(S): at 09:19

## 2025-04-04 RX ADMIN — Medication 1000 MILLIGRAM(S): at 21:36

## 2025-04-04 RX ADMIN — FLUTICASONE PROPIONATE 1 SPRAY(S): 50 SPRAY, METERED NASAL at 17:37

## 2025-04-04 RX ADMIN — Medication 0.5 MILLIGRAM(S): at 19:25

## 2025-04-04 RX ADMIN — Medication 1000 MILLIGRAM(S): at 17:30

## 2025-04-04 RX ADMIN — MONTELUKAST SODIUM 10 MILLIGRAM(S): 10 TABLET ORAL at 11:37

## 2025-04-04 RX ADMIN — Medication 0.5 MILLIGRAM(S): at 23:38

## 2025-04-04 RX ADMIN — FLUTICASONE PROPIONATE 1 SPRAY(S): 50 SPRAY, METERED NASAL at 05:54

## 2025-04-04 RX ADMIN — BUMETANIDE 1 MILLIGRAM(S): 1 TABLET ORAL at 05:53

## 2025-04-04 RX ADMIN — SILDENAFIL 20 MILLIGRAM(S): 50 TABLET, FILM COATED ORAL at 05:53

## 2025-04-04 RX ADMIN — RIVAROXABAN 15 MILLIGRAM(S): 10 TABLET, FILM COATED ORAL at 17:36

## 2025-04-04 RX ADMIN — Medication 40 MILLIGRAM(S): at 05:53

## 2025-04-04 RX ADMIN — SILDENAFIL 20 MILLIGRAM(S): 50 TABLET, FILM COATED ORAL at 21:36

## 2025-04-04 RX ADMIN — Medication 0.5 MILLIGRAM(S): at 19:55

## 2025-04-04 RX ADMIN — LINEZOLID 300 MILLIGRAM(S): 2 INJECTION, SOLUTION INTRAVENOUS at 14:16

## 2025-04-04 RX ADMIN — Medication 0.5 MILLIGRAM(S): at 04:35

## 2025-04-04 RX ADMIN — IPRATROPIUM BROMIDE AND ALBUTEROL SULFATE 3 MILLILITER(S): .5; 2.5 SOLUTION RESPIRATORY (INHALATION) at 14:20

## 2025-04-04 RX ADMIN — Medication 0.5 MILLIGRAM(S): at 23:40

## 2025-04-04 RX ADMIN — Medication 0.5 MILLIGRAM(S): at 04:03

## 2025-04-04 RX ADMIN — Medication 1000 MILLIGRAM(S): at 22:15

## 2025-04-04 RX ADMIN — Medication 1000 MILLIGRAM(S): at 14:39

## 2025-04-04 RX ADMIN — LINEZOLID 300 MILLIGRAM(S): 2 INJECTION, SOLUTION INTRAVENOUS at 04:04

## 2025-04-04 NOTE — PROGRESS NOTE ADULT - ATTENDING COMMENTS
Abdominal wall abscess s/p bedside I&D- continue Linezolid- surgery and transplant ID following  Chronic hypoxic respiratory failure on home O2 due to pHTN with right sided heart failure- pulmonary following- continue Remodulin, Sildenafil, diuretics  Asthma- continue Pulmicort, Singulair, Duonebs PRN, Flonase  DVT history- Xarelto held I&D, continue to hold pending decision of possible further surgical intervention- resume Lovenox for AC Abdominal wall abscess s/p bedside I&D- continue Linezolid- surgery and transplant ID following  Chronic hypoxic respiratory failure on home O2 due to pHTN with right sided heart failure- pulmonary following- continue Remodulin, Sildenafil, diuretics  Asthma- continue Pulmicort, Singulair, Duonebs PRN, Flonase  DVT history- resume Xarelto post I&D

## 2025-04-04 NOTE — DISCHARGE NOTE PROVIDER - NSDCCAREPROVSEEN_GEN_ALL_CORE_FT
[facility] Team 5 [facility] Team 5  Rogelio Gonsales, John Diamond, Jose Bella [facility] Team 5  Ilda, Rogelio Vera, John Diamond, Zackary Godwin, Jose Alexandre, Triston Rodrigez, Brandon Carlos, Boone Jauregui, Aleja JOE

## 2025-04-04 NOTE — PROGRESS NOTE ADULT - ASSESSMENT
44F w/ PMH of pHTN on Remodulin sq, sildenafil, UE DVT on Xarelto, Asthma, CHF  She is also following with transplant pulmonary and is finalizing pre-transplant evaluation.  Comes to the ED with concern with concern for abdominal abscesses. She was treated for this in the outpatient setting with doxycycline for the past 5 days without improvement.  Now being admitted for I&D and likely IV abx.  Skin lesions first started about 2 weeks ago. No fevers, chills, however local tenderness.    # pHTN  Patient has a known history of PAH on sildenfail, remodulin, diuretics. Also one O2 at home, 2-3lpm.   Appears stable from baseline.  - continue home sildenafil 20mg TID  - continue home Remodulin 51ng/kg/min on home pump  - continue home bumex, aldactone  - monitor K, Na, Mg  - I&D performed on 4/3, awaiting cultures  - in case of major surgery plans, please let pulmonary know, as patient might need adjustment to these medications  - no inpatient pulmonary plans    # asthma  Patient with known asthma, last received Fasenra on 4/1. PFTs from 1/2025 w/ severe obstruction, low DLCO.  - continue home equivalent pulmicort, spiriva, PRN atrovent nebs  - continue flonase

## 2025-04-04 NOTE — PROGRESS NOTE ADULT - SUBJECTIVE AND OBJECTIVE BOX
No acute events overnight. Seen at bedside and is doing well. No concerns after I&D. No fevers, rashes, chills, N/V, weight loss.     MEDICATIONS  (STANDING):  buDESOnide    Inhalation Suspension 0.5 milliGRAM(s) Inhalation daily  buMETAnide 1 milliGRAM(s) Oral daily  influenza   Vaccine 0.5 milliLiter(s) IntraMuscular once  linezolid  IVPB 600 milliGRAM(s) IV Intermittent every 12 hours  montelukast 10 milliGRAM(s) Oral daily  pantoprazole    Tablet 40 milliGRAM(s) Oral before breakfast  sildenafil (REVATIO) 20 milliGRAM(s) Oral every 8 hours  spironolactone 50 milliGRAM(s) Oral two times a day    MEDICATIONS  (PRN):  acetaminophen     Tablet .. 1000 milliGRAM(s) Oral every 6 hours PRN Mild Pain (1 - 3), Moderate Pain (4 - 6)  albuterol    90 MICROgram(s) HFA Inhaler 2 Puff(s) Inhalation every 6 hours PRN Shortness of Breath  albuterol/ipratropium for Nebulization 3 milliLiter(s) Nebulizer every 6 hours PRN Shortness of Breath  HYDROmorphone  Injectable 0.5 milliGRAM(s) IV Push every 4 hours PRN Severe Pain (7 - 10)      CAPILLARY BLOOD GLUCOSE        I&O's Summary      PHYSICAL EXAM:  Vital Signs Last 24 Hrs  T(C): 36.8 (2025 04:09), Max: 37 (2025 00:09)  T(F): 98.3 (2025 04:09), Max: 98.6 (2025 00:09)  HR: 104 (2025 04:09) (85 - 105)  BP: 104/70 (2025 04:09) (94/64 - 104/70)  BP(mean): --  RR: 18 (2025 04:09) (18 - 18)  SpO2: 96% (2025 04:09) (94% - 100%)    Parameters below as of 2025 04:09  Patient On (Oxygen Delivery Method): nasal cannula  O2 Flow (L/min): 2    CONSTITUTIONAL: NAD  HEET: MMM, EOMI, PERRLA  NECK: supple  RESPIRATORY: Normal respiratory effort; lungs are clear to auscultation bilaterally  CARDIOVASCULAR: Regular rate and rhythm, normal S1 and S2, no murmur/rub/gallop; No lower extremity edema; Peripheral pulses are 2+ bilaterally  ABDOMEN: Nontender to palpation, normoactive bowel sounds, no rebound/guarding; No hepatosplenomegaly  MUSCULOSKELETAL: no clubbing or cyanosis of digits; no joint swelling or tenderness to palpation  PSYCH: A+O to person, place, and time; affect appropriate  SKIN: No rash    LABS:                          11.3   7.55  )-----------( 337      ( 2025 06:19 )             36.9     04-04    135  |  101  |  7   ----------------------------<  105[H]  4.1   |  18[L]  |  0.86    Ca    8.3[L]      2025 06:18  Phos  3.7     04-04  Mg     2.0     04-04    TPro  7.3  /  Alb  3.8  /  TBili  0.5  /  DBili  x   /  AST  16  /  ALT  10  /  AlkPhos  64  04-02      PT/INR - ( 2025 13:35 )   PT: 13.0 sec;   INR: 1.14 ratio         PTT - ( 2025 14:27 )  PTT:36.8 sec      Urinalysis Basic - ( 2025 15:13 )    Color: Yellow / Appearance: Clear / S.017 / pH: x  Gluc: x / Ketone: Trace mg/dL  / Bili: Negative / Urobili: 1.0 mg/dL   Blood: x / Protein: Negative mg/dL / Nitrite: Negative   Leuk Esterase: Negative / RBC: x / WBC x   Sq Epi: x / Non Sq Epi: x / Bacteria: x    Urinalysis with Rflx Culture (collected 2025 15:13)      Tele Reviewed:    RADIOLOGY & ADDITIONAL TESTS:  Results Reviewed:   Imaging Personally Reviewed:  Electrocardiogram Personally Reviewed:     No acute events overnight. Seen at bedside and is doing well. No concerns after I&D. Slight pain after procedure but well controlled. No fevers, rashes, chills, N/V, weight loss.     MEDICATIONS  (STANDING):  buDESOnide    Inhalation Suspension 0.5 milliGRAM(s) Inhalation daily  buMETAnide 1 milliGRAM(s) Oral daily  influenza   Vaccine 0.5 milliLiter(s) IntraMuscular once  linezolid  IVPB 600 milliGRAM(s) IV Intermittent every 12 hours  montelukast 10 milliGRAM(s) Oral daily  pantoprazole    Tablet 40 milliGRAM(s) Oral before breakfast  sildenafil (REVATIO) 20 milliGRAM(s) Oral every 8 hours  spironolactone 50 milliGRAM(s) Oral two times a day    MEDICATIONS  (PRN):  acetaminophen     Tablet .. 1000 milliGRAM(s) Oral every 6 hours PRN Mild Pain (1 - 3), Moderate Pain (4 - 6)  albuterol    90 MICROgram(s) HFA Inhaler 2 Puff(s) Inhalation every 6 hours PRN Shortness of Breath  albuterol/ipratropium for Nebulization 3 milliLiter(s) Nebulizer every 6 hours PRN Shortness of Breath  HYDROmorphone  Injectable 0.5 milliGRAM(s) IV Push every 4 hours PRN Severe Pain (7 - 10)      CAPILLARY BLOOD GLUCOSE        I&O's Summary      PHYSICAL EXAM:  Vital Signs Last 24 Hrs  T(C): 36.8 (2025 04:09), Max: 37 (2025 00:09)  T(F): 98.3 (2025 04:09), Max: 98.6 (2025 00:09)  HR: 104 (2025 04:09) (85 - 105)  BP: 104/70 (2025 04:09) (94/64 - 104/70)  BP(mean): --  RR: 18 (2025 04:09) (18 - 18)  SpO2: 96% (2025 04:09) (94% - 100%)    Parameters below as of 2025 04:09  Patient On (Oxygen Delivery Method): nasal cannula  O2 Flow (L/min): 2    CONSTITUTIONAL: NAD  HEET: MMM, EOMI, PERRLA  NECK: supple  RESPIRATORY: Normal respiratory effort; lungs are clear to auscultation bilaterally  CARDIOVASCULAR: Regular rate and rhythm, normal S1 and S2, no murmur/rub/gallop; No lower extremity edema; Peripheral pulses are 2+ bilaterally  ABDOMEN: Nontender to palpation, normoactive bowel sounds, no rebound/guarding; No hepatosplenomegaly  MUSCULOSKELETAL: no clubbing or cyanosis of digits; no joint swelling or tenderness to palpation  PSYCH: A+O to person, place, and time; affect appropriate  SKIN: No rash    LABS:                          11.3   7.55  )-----------( 337      ( 2025 06:19 )             36.9     04-04    135  |  101  |  7   ----------------------------<  105[H]  4.1   |  18[L]  |  0.86    Ca    8.3[L]      2025 06:18  Phos  3.7     04-04  Mg     2.0     04-04    TPro  7.3  /  Alb  3.8  /  TBili  0.5  /  DBili  x   /  AST  16  /  ALT  10  /  AlkPhos  64  04-02      PT/INR - ( 2025 13:35 )   PT: 13.0 sec;   INR: 1.14 ratio         PTT - ( 2025 14:27 )  PTT:36.8 sec      Urinalysis Basic - ( 2025 15:13 )    Color: Yellow / Appearance: Clear / S.017 / pH: x  Gluc: x / Ketone: Trace mg/dL  / Bili: Negative / Urobili: 1.0 mg/dL   Blood: x / Protein: Negative mg/dL / Nitrite: Negative   Leuk Esterase: Negative / RBC: x / WBC x   Sq Epi: x / Non Sq Epi: x / Bacteria: x    Urinalysis with Rflx Culture (collected 2025 15:13)      Tele Reviewed:    RADIOLOGY & ADDITIONAL TESTS:  Results Reviewed:   Imaging Personally Reviewed:  Electrocardiogram Personally Reviewed:     No acute events overnight. Seen at bedside and is doing well. No concerns after I&D. Slight pain after procedure but well controlled. No fevers, rashes, chills, N/V, weight loss.   Also with epsidoe of hypotension 88/60, seen at bedside and evaled and after curbside with pulm fellow, no fluids given, encouraged PO intake     MEDICATIONS  (STANDING):  buDESOnide    Inhalation Suspension 0.5 milliGRAM(s) Inhalation daily  buMETAnide 1 milliGRAM(s) Oral daily  influenza   Vaccine 0.5 milliLiter(s) IntraMuscular once  linezolid  IVPB 600 milliGRAM(s) IV Intermittent every 12 hours  montelukast 10 milliGRAM(s) Oral daily  pantoprazole    Tablet 40 milliGRAM(s) Oral before breakfast  sildenafil (REVATIO) 20 milliGRAM(s) Oral every 8 hours  spironolactone 50 milliGRAM(s) Oral two times a day    MEDICATIONS  (PRN):  acetaminophen     Tablet .. 1000 milliGRAM(s) Oral every 6 hours PRN Mild Pain (1 - 3), Moderate Pain (4 - 6)  albuterol    90 MICROgram(s) HFA Inhaler 2 Puff(s) Inhalation every 6 hours PRN Shortness of Breath  albuterol/ipratropium for Nebulization 3 milliLiter(s) Nebulizer every 6 hours PRN Shortness of Breath  HYDROmorphone  Injectable 0.5 milliGRAM(s) IV Push every 4 hours PRN Severe Pain (7 - 10)      CAPILLARY BLOOD GLUCOSE        I&O's Summary      PHYSICAL EXAM:  Vital Signs Last 24 Hrs  T(C): 36.8 (2025 04:09), Max: 37 (2025 00:09)  T(F): 98.3 (2025 04:09), Max: 98.6 (2025 00:09)  HR: 104 (2025 04:09) (85 - 105)  BP: 104/70 (2025 04:09) (94/64 - 104/70)  BP(mean): --  RR: 18 (2025 04:09) (18 - 18)  SpO2: 96% (2025 04:09) (94% - 100%)    Parameters below as of 2025 04:09  Patient On (Oxygen Delivery Method): nasal cannula  O2 Flow (L/min): 2    CONSTITUTIONAL: NAD  HEET: MMM, EOMI, PERRLA  NECK: supple  RESPIRATORY: Normal respiratory effort; lungs are clear to auscultation bilaterally  CARDIOVASCULAR: Regular rate and rhythm, normal S1 and S2, no murmur/rub/gallop; No lower extremity edema; Peripheral pulses are 2+ bilaterally  ABDOMEN: Nontender to palpation, normoactive bowel sounds, no rebound/guarding; No hepatosplenomegaly  MUSCULOSKELETAL: no clubbing or cyanosis of digits; no joint swelling or tenderness to palpation  PSYCH: A+O to person, place, and time; affect appropriate  SKIN: No rash    LABS:                          11.3   7.55  )-----------( 337      ( 2025 06:19 )             36.9     04-04    135  |  101  |  7   ----------------------------<  105[H]  4.1   |  18[L]  |  0.86    Ca    8.3[L]      2025 06:18  Phos  3.7     04-04  Mg     2.0     04-04    TPro  7.3  /  Alb  3.8  /  TBili  0.5  /  DBili  x   /  AST  16  /  ALT  10  /  AlkPhos  64  04-02      PT/INR - ( 2025 13:35 )   PT: 13.0 sec;   INR: 1.14 ratio         PTT - ( 2025 14:27 )  PTT:36.8 sec      Urinalysis Basic - ( 2025 15:13 )    Color: Yellow / Appearance: Clear / S.017 / pH: x  Gluc: x / Ketone: Trace mg/dL  / Bili: Negative / Urobili: 1.0 mg/dL   Blood: x / Protein: Negative mg/dL / Nitrite: Negative   Leuk Esterase: Negative / RBC: x / WBC x   Sq Epi: x / Non Sq Epi: x / Bacteria: x    Urinalysis with Rflx Culture (collected 2025 15:13)      Tele Reviewed:    RADIOLOGY & ADDITIONAL TESTS:  Results Reviewed:   Imaging Personally Reviewed:  Electrocardiogram Personally Reviewed:

## 2025-04-04 NOTE — DISCHARGE NOTE PROVIDER - NSDCMRMEDTOKEN_GEN_ALL_CORE_FT
Atrovent 18 mcg/inh inhalation aerosol: 1 inhaled 4 times a day as needed for  shortness of breath and/or wheezing  bumetanide 1 mg oral tablet: 1 tab(s) orally once a day  montelukast 10 mg oral tablet: 1 tab(s) orally once a day  NIFEdipine 30 mg oral tablet, extended release: 1 tab(s) orally once a day at noon  omeprazole 40 mg oral delayed release capsule: 1 cap(s) orally once a day  predniSONE 5 mg oral tablet: 1 tab(s) orally once a day  Remodulin 5 mg/mL injectable solution: 1 application injectable once a day via her own SQ PUMP  sildenafil 20 mg oral tablet: 1 tab(s) orally every 8 hours  spironolactone 25 mg oral tablet: 2 tab(s) orally 2 times a day  Xarelto 15 mg oral tablet: 1 tab(s) orally once a day resume tomorrow 2/6   Atrovent 18 mcg/inh inhalation aerosol: 1 inhaled 4 times a day as needed for  shortness of breath and/or wheezing  bumetanide 1 mg oral tablet: 1 tab(s) orally once a day  Hibiclens 4% topical soap: Apply topically to affected area 3 to 4 times a week Lather from neck down body and let it sit for 30seconds to  1 minute then rinse with water. Do this three times a week at least  linezolid 600 mg oral tablet: 1 tab(s) orally every 12 hours  linezolid 600 mg oral tablet: 1 tab(s) orally 2 times a day  montelukast 10 mg oral tablet: 1 tab(s) orally once a day  NIFEdipine 30 mg oral tablet, extended release: 1 tab(s) orally once a day at noon  omeprazole 40 mg oral delayed release capsule: 1 cap(s) orally once a day  predniSONE 5 mg oral tablet: 1 tab(s) orally once a day  Remodulin 5 mg/mL injectable solution: 1 application injectable once a day via her own SQ PUMP  sildenafil 20 mg oral tablet: 1 tab(s) orally every 8 hours  spironolactone 25 mg oral tablet: 2 tab(s) orally 2 times a day  Xarelto 15 mg oral tablet: 1 tab(s) orally once a day resume tomorrow 2/6   acetaminophen 500 mg oral tablet: 2 tab(s) orally every 6 hours As needed Mild Pain (1 - 3)  Atrovent 18 mcg/inh inhalation aerosol: 1 inhaled 4 times a day as needed for  shortness of breath and/or wheezing  bumetanide 1 mg oral tablet: 1 tab(s) orally once a day  diphenhydrAMINE 25 mg oral capsule: 1 cap(s) orally every 4 hours as needed for Rash and/or Itching  Hibiclens 4% topical soap: Apply topically to affected area 3 to 4 times a week Lather from neck down body and let it sit for 30seconds to  1 minute then rinse with water. Do this three times a week at least  meclizine 12.5 mg oral tablet: 1 tab(s) orally once a day as needed for Dizziness  metoclopramide 10 mg oral tablet: 1 tab(s) orally every 8 hours as needed for Migraine  montelukast 10 mg oral tablet: 1 tab(s) orally once a day  NIFEdipine 30 mg oral tablet, extended release: 1 tab(s) orally once a day at noon  omeprazole 40 mg oral delayed release capsule: 1 cap(s) orally once a day  physical therapy: outpatient PT evaluate and treat  polyethylene glycol 3350 oral powder for reconstitution: 17 gram(s) orally once a day as needed for Constipation  predniSONE 5 mg oral tablet: 1 tab(s) orally once a day  Remodulin 5 mg/mL injectable solution: 1 application injectable once a day via her own SQ PUMP  sildenafil 20 mg oral tablet: 1 tab(s) orally every 8 hours  spironolactone 25 mg oral tablet: 2 tab(s) orally 2 times a day  Vestibular Rehab: evaluate and treat  Xarelto 15 mg oral tablet: 1 tab(s) orally once a day resume tomorrow 2/6   acetaminophen 500 mg oral tablet: 2 tab(s) orally every 6 hours As needed Mild Pain (1 - 3)  Atrovent 18 mcg/inh inhalation aerosol: 1 inhaled 4 times a day as needed for  shortness of breath and/or wheezing  diphenhydrAMINE 25 mg oral capsule: 1 cap(s) orally every 4 hours as needed for Rash and/or Itching  Hibiclens 4% topical soap: Apply topically to affected area 3 to 4 times a week Lather from neck down body and let it sit for 30seconds to  1 minute then rinse with water. Do this three times a week at least  meclizine 12.5 mg oral tablet: 1 tab(s) orally once a day as needed for Dizziness  metoclopramide 10 mg oral tablet: 1 tab(s) orally every 8 hours as needed for Migraine  montelukast 10 mg oral tablet: 1 tab(s) orally once a day  omeprazole 40 mg oral delayed release capsule: 1 cap(s) orally once a day  physical therapy: outpatient PT evaluate and treat  polyethylene glycol 3350 oral powder for reconstitution: 17 gram(s) orally once a day as needed for Constipation  predniSONE 5 mg oral tablet: 1 tab(s) orally once a day  Remodulin 5 mg/mL injectable solution: 1 application injectable once a day via her own SQ PUMP  sildenafil 20 mg oral tablet: 1 tab(s) orally every 8 hours Hold for now , Dr. Yoon will adjust in office  Vestibular Rehab: evaluate and treat  Xarelto 15 mg oral tablet: 1 tab(s) orally once a day resume tomorrow 2/6

## 2025-04-04 NOTE — DISCHARGE NOTE PROVIDER - PROVIDER TOKENS
PROVIDER:[TOKEN:[[id number]],FOLLOWUP:[1 week],ESTABLISHEDPATIENT:[T]] PROVIDER:[TOKEN:[[id number]],FOLLOWUP:[1 week],ESTABLISHEDPATIENT:[T]],PROVIDER:[[id number]],FOLLOWUP:[1 week]]

## 2025-04-04 NOTE — PROGRESS NOTE ADULT - ATTENDING COMMENTS
Patient examined by me on AM rounds. Reports improvement in symptoms after drainage of anterior abdominal wall abscess yesterday. F/up cx. ACS to sign off.

## 2025-04-04 NOTE — PROGRESS NOTE ADULT - SUBJECTIVE AND OBJECTIVE BOX
Follow Up:  intraabdominal abscess    Interval History: s/p I&D of intraabdominal abscess on 4/3. afebrile.     REVIEW OF SYSTEMS  [  ] ROS unobtainable because:    [ x ] All other systems negative except as noted below    Constitutional:  [ ] fever [ ] chills  [ ] weight loss  [ ] weakness  Skin:  [ ] rash [ ] phlebitis	  Eyes: [ ] icterus [ ] pain  [ ] discharge	  ENMT: [ ] sore throat  [ ] thrush [ ] ulcers [ ] exudates  Respiratory: [ ] dyspnea [ ] hemoptysis [ ] cough [ ] sputum	  Cardiovascular:  [ ] chest pain [ ] palpitations [ ] edema	  Gastrointestinal:  [ ] nausea [ ] vomiting [ ] diarrhea [ ] constipation [ ] pain	  Genitourinary:  [ ] dysuria [ ] frequency [ ] hematuria [ ] discharge [ ] flank pain  [ ] incontinence  Musculoskeletal:  [ ] myalgias [ ] arthralgias [ ] arthritis  [ ] back pain  Neurological:  [ ] headache [ ] seizures  [ ] confusion/altered mental status    Allergies  vancomycin (Rash)  penicillin (Rash)  adhesives (Rash)        ANTIMICROBIALS:  linezolid  IVPB 600 every 12 hours      OTHER MEDS:  MEDICATIONS  (STANDING):  acetaminophen     Tablet .. 1000 every 6 hours PRN  albuterol    90 MICROgram(s) HFA Inhaler 2 every 6 hours PRN  albuterol/ipratropium for Nebulization 3 every 6 hours PRN  buDESOnide    Inhalation Suspension 0.5 daily  buMETAnide 1 daily  HYDROmorphone  Injectable 0.5 every 4 hours PRN  influenza   Vaccine 0.5 once  montelukast 10 daily  pantoprazole    Tablet 40 before breakfast  sildenafil (REVATIO) 20 every 8 hours  spironolactone 50 two times a day      Vital Signs Last 24 Hrs  T(C): 36.6 (04 Apr 2025 11:11), Max: 37 (04 Apr 2025 00:09)  T(F): 97.9 (04 Apr 2025 11:11), Max: 98.6 (04 Apr 2025 00:09)  HR: 101 (04 Apr 2025 14:28) (81 - 105)  BP: 88/62 (04 Apr 2025 14:28) (88/62 - 104/70)  BP(mean): --  RR: 18 (04 Apr 2025 14:28) (18 - 19)  SpO2: 99% (04 Apr 2025 14:28) (96% - 100%)    Parameters below as of 04 Apr 2025 14:28  Patient On (Oxygen Delivery Method): nasal cannula  O2 Flow (L/min): 2    PHYSICAL EXAMINATION:  General: Alert and Awake, NAD  HEENT: Normocephalic / Atraumatic  Resp: No accessory muscles of respiration utilized  Abdomen: +left sided infusion pump (no induration around that site). Dressing over right sided abdominal wall abscess , NBS, No HSM, No rigidity or guarding  MSK: No LE edema.   : No lucas  Skin: No rashes or lesions.    Neuro: Alert and Awake. CN 2-12 Grossly intact. Moves all four extremities spontaneously.  Psych: Calm, Pleasant, Cooperative                          11.3   7.55  )-----------( 337      ( 04 Apr 2025 06:19 )             36.9       04-04    135  |  101  |  7   ----------------------------<  105[H]  4.1   |  18[L]  |  0.86    Ca    8.3[L]      04 Apr 2025 06:18  Phos  3.7     04-04  Mg     2.0     04-04        Urinalysis Basic - ( 04 Apr 2025 06:18 )    Color: x / Appearance: x / SG: x / pH: x  Gluc: 105 mg/dL / Ketone: x  / Bili: x / Urobili: x   Blood: x / Protein: x / Nitrite: x   Leuk Esterase: x / RBC: x / WBC x   Sq Epi: x / Non Sq Epi: x / Bacteria: x        MICROBIOLOGY:  v  Abscess abdominal wall  04-03-25 --  --    Moderate polymorphonuclear leukocytes per low power field  No organisms seen per oil power field      Blood Blood-Peripheral  04-02-25   No growth at 24 hours  --  --      Blood Blood-Peripheral  04-02-25   No growth at 24 hours  --  --        Toxoplasma IgG Screen: <3.00 IU/mL (10-25-24 @ 07:14)  HIV-1 RNA Quantitative, Viral Load Log: NOT DET. lg /mL (10-22-24 @ 17:39)  CMV IgG Antibody: >10.00 U/mL (10-22-24 @ 15:11)          RADIOLOGY:    <The imaging below has been reviewed and visualized by me independently. Findings as detailed in report below>    < from: CT Abdomen and Pelvis w/ IV Cont (04.02.25 @ 22:08) >  IMPRESSION:  Peripherally enhancing lesion in the right mid abdominal wall subjacent   to diffuse skin thickening measuring 5.0 x 2.1 x 4.0 cm consistent with   phlegmon/abscess. Underlying mass is not excluded.    Superficial skin thickening in the left abdominal wall at the level of   the umbilicus. Underlying small abscess measures 8 mm.    Groundglass opacity at the lung bases consistent with   infectious/inflammatory etiology.    < end of copied text >

## 2025-04-04 NOTE — PROGRESS NOTE ADULT - SUBJECTIVE AND OBJECTIVE BOX
CHIEF COMPLAINT:    Interval Events:    s/p I&D yesterday, awaiting cultures    REVIEW OF SYSTEMS:  [ x ] All other systems negative  [ ] Unable to assess ROS because ________    OBJECTIVE:  ICU Vital Signs Last 24 Hrs  T(C): 36.8 (04 Apr 2025 04:09), Max: 37 (04 Apr 2025 00:09)  T(F): 98.3 (04 Apr 2025 04:09), Max: 98.6 (04 Apr 2025 00:09)  HR: 104 (04 Apr 2025 04:09) (85 - 105)  BP: 104/70 (04 Apr 2025 04:09) (94/64 - 104/70)  BP(mean): --  ABP: --  ABP(mean): --  RR: 18 (04 Apr 2025 04:09) (18 - 18)  SpO2: 96% (04 Apr 2025 04:09) (94% - 100%)    O2 Parameters below as of 04 Apr 2025 04:09  Patient On (Oxygen Delivery Method): nasal cannula  O2 Flow (L/min): 2            04-03 @ 07:01  -  04-04 @ 07:00  --------------------------------------------------------  IN: 840 mL / OUT: 0 mL / NET: 840 mL      CAPILLARY BLOOD GLUCOSE          PHYSICAL EXAM:  General: NAD  HEENT: Normal sclera  Lymph Nodes: No LAD  Respiratory: CTABL  Cardiovascular: Regular rate and rhythm no m/r/g  Abdomen: Nontender nondistended  Extremities: No peripheral edema  Skin: No rashes  Neurological: No appreciable neurologic deficits  Psychiatry: Appropriate mood and affect    HOSPITAL MEDICATIONS:  MEDICATIONS  (STANDING):  buDESOnide    Inhalation Suspension 0.5 milliGRAM(s) Inhalation daily  buMETAnide 1 milliGRAM(s) Oral daily  fluticasone propionate 50 MICROgram(s)/spray Nasal Spray 1 Spray(s) Both Nostrils two times a day  influenza   Vaccine 0.5 milliLiter(s) IntraMuscular once  lidocaine 1%/epinephrine 1:100,000 Inj 20 milliLiter(s) Local Injection once  linezolid  IVPB 600 milliGRAM(s) IV Intermittent every 12 hours  montelukast 10 milliGRAM(s) Oral daily  pantoprazole    Tablet 40 milliGRAM(s) Oral before breakfast  remodulin (treprostinil) SubQ infusion 1 Dose(s) 1 Dose(s) SubCutaneous Continuous Pump  sildenafil (REVATIO) 20 milliGRAM(s) Oral every 8 hours  spironolactone 50 milliGRAM(s) Oral two times a day    MEDICATIONS  (PRN):  acetaminophen     Tablet .. 1000 milliGRAM(s) Oral every 6 hours PRN Mild Pain (1 - 3), Moderate Pain (4 - 6)  albuterol    90 MICROgram(s) HFA Inhaler 2 Puff(s) Inhalation every 6 hours PRN Shortness of Breath  albuterol/ipratropium for Nebulization 3 milliLiter(s) Nebulizer every 6 hours PRN Shortness of Breath  HYDROmorphone  Injectable 0.5 milliGRAM(s) IV Push every 4 hours PRN Severe Pain (7 - 10)      LABS:                        11.3   7.55  )-----------( 337      ( 04 Apr 2025 06:19 )             36.9     Hgb Trend: 11.3<--, 11.7<--, 13.7<--  04-04    135  |  101  |  7   ----------------------------<  105[H]  4.1   |  18[L]  |  0.86    Ca    8.3[L]      04 Apr 2025 06:18  Phos  3.7     04-04  Mg     2.0     04-04    TPro  7.3  /  Alb  3.8  /  TBili  0.5  /  DBili  x   /  AST  16  /  ALT  10  /  AlkPhos  64  04-02    Creatinine Trend: 0.86<--, 0.87<--, 0.82<--  PT/INR - ( 02 Apr 2025 13:35 )   PT: 13.0 sec;   INR: 1.14 ratio         PTT - ( 02 Apr 2025 14:27 )  PTT:36.8 sec  Urinalysis Basic - ( 04 Apr 2025 06:18 )    Color: x / Appearance: x / SG: x / pH: x  Gluc: 105 mg/dL / Ketone: x  / Bili: x / Urobili: x   Blood: x / Protein: x / Nitrite: x   Leuk Esterase: x / RBC: x / WBC x   Sq Epi: x / Non Sq Epi: x / Bacteria: x        Venous Blood Gas:  04-03 @ 10:45  7.36/42/56/24/85.9  VBG Lactate: 1.0  Venous Blood Gas:  04-02 @ 14:27  7.35/44/40/24/61.3  VBG Lactate: 0.7      MICROBIOLOGY:     Culture - Abscess with Gram Stain (collected 03 Apr 2025 14:15)  Source: Abscess abdominal wall  Gram Stain (03 Apr 2025 23:03):    Moderate polymorphonuclear leukocytes per low power field    No organisms seen per oil power field    Urinalysis with Rflx Culture (collected 02 Apr 2025 15:13)    Culture - Blood (collected 02 Apr 2025 12:30)  Source: Blood Blood-Peripheral  Preliminary Report (03 Apr 2025 18:02):    No growth at 24 hours    Culture - Blood (collected 02 Apr 2025 12:15)  Source: Blood Blood-Peripheral  Preliminary Report (03 Apr 2025 18:02):    No growth at 24 hours        RADIOLOGY:  [ x ] Reviewed and interpreted by me    PULMONARY FUNCTION TESTS:    EKG:

## 2025-04-04 NOTE — DISCHARGE NOTE PROVIDER - CARE PROVIDER_API CALL
Vivian Yoon  Pulmonary Disease  [address], [facility] [address]  [place] [address]  Phone: [phone]  Fax: [phone]  Established Patient  Follow Up Time: 1 week   Vivian Yoon  Pulmonary Disease  [address] [facility], [address]  [place] [address]  Phone: [phone]  Fax: [phone]  Established Patient  Follow Up Time: 1 week    Margo Fernandez  Infectious Disease  [address] [place] [address]  Phone: [phone]  Fax: [phone]  Follow Up Time: 1 week

## 2025-04-04 NOTE — PROGRESS NOTE ADULT - PROBLEM SELECTOR PLAN 3
Seen outpatient by Dr Yoon who sent into the hospital   Has Remodulin pump near sight of infection  Also take sildenafil     PLAN  - Pulmonary following   - -continue home sildenafil 20mg TID  - continue home Remodulin 51ng/kg/min on home pump  - continue home bumex, aldactone Seen outpatient by Dr Yoon who sent into the hospital   Has Remodulin pump near sight of infection  Also take sildenafil     PLAN  - Pulmonary following   - -continue home sildenafil 20mg TID  - continue home Remodulin 51ng/kg/min on home pump  - continue home Bumex, aldactone

## 2025-04-04 NOTE — PROGRESS NOTE ADULT - PROBLEM SELECTOR PLAN 5
2/2 to pulmonary hypertension   Seen by Dr Yoon outpatient       PLAN  - Hold home nifedipine for low BP  - continue home bumex and aldactone per pulm 2/2 to pulmonary hypertension   Seen by Dr Yoon outpatient       PLAN  - Hold home nifedipine for low BP  - continue home Bumex and aldactone per pulm

## 2025-04-04 NOTE — DISCHARGE NOTE PROVIDER - HOSPITAL COURSE
HPI:  44y F , with PMH of pHTN (on treprostinil infusion still continuing ), Right   Heart failure(s/p PPM dual chamber ), chronic PE (dx ) on DOAC, SVT (s/p ablation 2020), asthma, h/o MRSA abdominal wall abscesses last , and JULIA. was admitted after being sent in from outpatient pulmonologist. for concern of worsening abdominal abscess in proximity to medication pump that has been unresponsive to doxycyline. Per patient she started having abdominal pain and increased swelling two weeks ago. Went into her outpatient transplant ID doctor Dr Ash and was given a course of doxycyline yesterday she presented to her pulmonologist DR Yoon who was concerned about the size of the abscess and that it was not improving. She was sent in for further evaluation Patient denied cough, fevers, myalgias vomiting ot diarrhea during the time period of the abscess formations     ED course   Tmax 97.7, , /56 on 2L home   Not meeting SIRS criteria   Linezolid and Cefepime, Ofirmev, Morphine   (2025 16:47)    Hospital Course:  The patient presented after two weeks of a progressively enlarging abdominal wall abscess that had not responded to oral antibiotics.  On examination, a large abdominal wall abscess was noted.  She was started on linezolid and cefepime, and a bedside incision and drainage was performed by the surgical service, who also sent a wound culture.  Infectious Disease was consulted and recommended continuing the current antibiotic regimen pending culture results.  Due to a history of pulmonary hypertension, the patient was also evaluated by the pulmonary service, who recommended continuing all home medications. The patient experienced a brief episode of hypotension, which is common for her, and was not given fluids due to the underlying pulmonary hypertension.        Important Medication Changes and Reason:  -     Active or Pending Issues Requiring Follow-up:    Advanced Directives:   [X] Full code  [ ] DNR  [ ] Hospice    Discharge Diagnoses:         HPI:  44y F , with PMH of pHTN (on treprostinil infusion still continuing ), Right   Heart failure(s/p PPM dual chamber ), chronic PE (dx ) on DOAC, SVT (s/p ablation 2020), asthma, h/o MRSA abdominal wall abscesses last , and JULIA. was admitted after being sent in from outpatient pulmonologist. for concern of worsening abdominal abscess in proximity to medication pump that has been unresponsive to doxycyline. Per patient she started having abdominal pain and increased swelling two weeks ago. Went into her outpatient transplant ID doctor Dr Ash and was given a course of doxycyline yesterday she presented to her pulmonologist DR Yoon who was concerned about the size of the abscess and that it was not improving. She was sent in for further evaluation Patient denied cough, fevers, myalgias vomiting ot diarrhea during the time period of the abscess formations     ED course   Tmax 97.7, , /56 on 2L home   Not meeting SIRS criteria   Linezolid and Cefepime, Ofirmev, Morphine   (2025 16:47)    Hospital Course:  The patient presented after two weeks of a progressively enlarging abdominal wall abscess that had not responded to oral antibiotics.  On examination, a large abdominal wall abscess was noted.  She was started on linezolid and cefepime, and a bedside incision and drainage was performed by the surgical service, who also sent a wound culture.  Infectious Disease was consulted and recommended continuing the current antibiotic regimen pending culture results.  Due to a history of pulmonary hypertension, the patient was also evaluated by the pulmonary service, who recommended continuing all home medications. The patient experienced a brief episode of hypotension, which is common for her, and was not given fluids due to the underlying pulmonary hypertension. Abscess cultures showed MRSA bacterial growth and patient was transisioned to PO linezolid for 10 more days for total 14 day course         Important Medication Changes and Reason:  - PO Linezolid 600 Q12 for 10 more days 25 - 25)    Active or Pending Issues Requiring Follow-up:  - ID follow up with DR Ash  - Pulm follow up with Dr Yoon    Advanced Directives:   [X] Full code  [ ] DNR  [ ] Hospice    Discharge Diagnoses:  - MRSA abscess          HPI:  44y F , with PMH of pHTN (on treprostinil infusion still continuing ), Right   Heart failure(s/p PPM dual chamber ), chronic PE (dx ) on DOAC, SVT (s/p ablation 2020), asthma, h/o MRSA abdominal wall abscesses last , and JULIA. was admitted after being sent in from outpatient pulmonologist. for concern of worsening abdominal abscess in proximity to medication pump that has been unresponsive to doxycyline. Per patient she started having abdominal pain and increased swelling two weeks ago. Went into her outpatient transplant ID doctor Dr Ash and was given a course of doxycyline yesterday she presented to her pulmonologist DR Yoon who was concerned about the size of the abscess and that it was not improving. She was sent in for further evaluation Patient denied cough, fevers, myalgias vomiting ot diarrhea during the time period of the abscess formations     ED course   Tmax 97.7, , /56 on 2L home   Not meeting SIRS criteria   Linezolid and Cefepime, Ofirmev, Morphine   (2025 16:47)    Hospital Course:  The patient presented after two weeks of a progressively enlarging abdominal wall abscess that had not responded to oral antibiotics.  On examination, a large abdominal wall abscess was noted.  She was started on linezolid and cefepime, and a bedside incision and drainage was performed by the surgical service, who also sent a wound culture.  Infectious Disease was consulted and recommended continuing the current antibiotic regimen pending culture results.  Due to a history of pulmonary hypertension, the patient was also evaluated by the pulmonary service, who recommended continuing all home medications. The patient experienced a brief episode of hypotension, which is common for her, and was not given fluids due to the underlying pulmonary hypertension. Abscess cultures showed MRSA bacterial growth and patient was transitioned to PO linezolid for 10 more days for total 14 day course         Important Medication Changes and Reason:      Active or Pending Issues Requiring Follow-up:  - ID follow up with Dr. Ash  - Pulm follow up with Dr Yoon    Advanced Directives:   [X] Full code  [ ] DNR  [ ] Hospice    Discharge Diagnoses:  - MRSA abscess   -Pulmonary HTN  -CHF         HPI:  44y F , with PMH of pHTN (on treprostinil infusion still continuing ), Right   Heart failure(s/p PPM dual chamber ), chronic PE (dx ) on DOAC, SVT (s/p ablation 2020), asthma, h/o MRSA abdominal wall abscesses last , and JULIA. was admitted after being sent in from outpatient pulmonologist. for concern of worsening abdominal abscess in proximity to medication pump that has been unresponsive to doxycyline. Per patient she started having abdominal pain and increased swelling two weeks ago. Went into her outpatient transplant ID doctor Dr Ash and was given a course of doxycyline yesterday she presented to her pulmonologist DR Yoon who was concerned about the size of the abscess and that it was not improving. She was sent in for further evaluation Patient denied cough, fevers, myalgias vomiting ot diarrhea during the time period of the abscess formations     ED course   Tmax 97.7, , /56 on 2L home   Not meeting SIRS criteria   Linezolid and Cefepime, Ofirmev, Morphine   (2025 16:47)    Hospital Course:  The patient presented after two weeks of a progressively enlarging abdominal wall abscess that had not responded to oral antibiotics.  On examination, a large abdominal wall abscess was noted.  She was started on linezolid and cefepime, and a bedside incision and drainage was performed by the surgical service, who also sent a wound culture.  Infectious Disease was consulted and recommended continuing the current antibiotic regimen pending culture results.  Due to a history of pulmonary hypertension, the patient was also evaluated by the pulmonary service, who recommended continuing all home medications. The patient experienced a brief episode of hypotension, which is common for her, and was not given fluids due to the underlying pulmonary hypertension. Abscess cultures showed MRSA bacterial growth and patient was transitioned to PO linezolid for 10 more days for total 14 day course.  Initially started on Linezolid switched to Daptomycin. S/p bedside aspiration of LLQ collection  Cx growing Pseudomonas, switched to Cefepime. Currently ongoing lung transplant and penicillin desensitization testing conducted on .   Patient is stable for discharge         Important Medication Changes and Reason:      Active or Pending Issues Requiring Follow-up:  - ID follow up with Dr. Ash  - Pulm follow up with Dr Yoon    Advanced Directives:   [X] Full code  [ ] DNR  [ ] Hospice    Discharge Diagnoses:  - MRSA abscess   -Pulmonary HTN  -CHF         HPI:  44y F , with PMH of pHTN (on treprostinil infusion still continuing ), Right   Heart failure(s/p PPM dual chamber ), chronic PE (dx ) on DOAC, SVT (s/p ablation 2020), asthma, h/o MRSA abdominal wall abscesses last , and JULIA. was admitted after being sent in from outpatient pulmonologist. for concern of worsening abdominal abscess in proximity to medication pump that has been unresponsive to doxycyline. Per patient she started having abdominal pain and increased swelling two weeks ago. Went into her outpatient transplant ID doctor Dr Ash and was given a course of doxycyline yesterday she presented to her pulmonologist DR Yoon who was concerned about the size of the abscess and that it was not improving. She was sent in for further evaluation Patient denied cough, fevers, myalgias vomiting ot diarrhea during the time period of the abscess formations     ED course   Tmax 97.7, , /56 on 2L home   Not meeting SIRS criteria   Linezolid and Cefepime, Ofirmev, Morphine   (2025 16:47)    Hospital Course:  The patient presented after two weeks of a progressively enlarging abdominal wall abscess that had not responded to oral antibiotics.  On examination, a large abdominal wall abscess was noted.  She was started on linezolid and cefepime, and a bedside incision and drainage was performed by the surgical service, who also sent a wound culture.  Infectious Disease was consulted and recommended continuing the current antibiotic regimen pending culture results.  Due to a history of pulmonary hypertension, the patient was also evaluated by the pulmonary service, who recommended continuing all home medications. The patient experienced a brief episode of hypotension, which is common for her, and was not given fluids due to the underlying pulmonary hypertension. Abscess cultures showed MRSA bacterial growth and patient was transitioned to PO linezolid for 10 more days for total 14 day course.  Initially started on Linezolid switched to Daptomycin. S/p bedside aspiration of LLQ collection  Cx growing Pseudomonas, switched to Cefepime. Currently ongoing lung transplant and penicillin desensitization testing conducted on .   Patient is stable for discharge         Important Medication Changes and Reason:  Hold Procardia for now until seen by Cardiologist   Side      Active or Pending Issues Requiring Follow-up:  - ID follow up with Dr. Ash  - Pulm follow up with Dr Yoon    Advanced Directives:   [X] Full code  [ ] DNR  [ ] Hospice    Discharge Diagnoses:  - MRSA abscess   -Pulmonary HTN  -CHF

## 2025-04-04 NOTE — CHART NOTE - NSCHARTNOTEFT_GEN_A_CORE
Called to bedside as patient had manual BP of 88/56, patient seen at bedside and did endorse some dizziness and headache. The dizziness was new by the headache was chronic and happens periodically of this quality. Curbsided Pulm fellow Max and after discussion decided to not give fluid given patient right heart failure and pulmonary hypertension. Saw patient at bedside and she voiced understanding. Encouraged PO intake with fluid as have helped her in the morning. Will continue to monitor

## 2025-04-04 NOTE — PROGRESS NOTE ADULT - ASSESSMENT
43 y/o female, PMH pHTN (on treprostinil infusion), HF (s/p PPM dual chamber 2016), chronic PE (dx 2017) on DOAC, SVT (s/p ablation 05/2020), asthma, h/o MRSA abdominal wall abscesses last 2/'25, and JULIA. was admitted to [facility] 10/13/24-10/29/24 for pre-transplant expedited workup. Sent to ED 4/2/25 from MD for abdominal abscess    CXR (4/2) Perihilar opacities, likely represents enlarged pulmonary arteries. Otherwise clear lungs.    CT A/P (4/2) Peripherally enhancing lesion in the right mid abdominal wall subjacent to diffuse skin thickening measuring 5.0 x 2.1 x 4.0 cm consistent with phlegmon/abscess. Underlying mass is not excluded. Superficial skin thickening in the left abdominal wall at the level of   the umbilicus. Underlying small abscess measures 8 mm.    s/p bedside abdominal wall abscess I&D on 4/3    #MRSA Abdominal Wall Abscess  --Follow up on 4/3 abdominal wall abscess I&D  --Continue Linezolid 600 BID. 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   --Continue to follow CBC with diff  --Continue to follow temperature curve  --Follow up on preliminary blood cultures    #Pre-Lung Transplant Evaluation  COVID19 Nucleocapsid Antibody Negative  COVID19 Rajinder Antibody Positive  HAV IgG Negative  HBVs Ab Negative  HBVsAg Negative  HBVc Ab Negative  HCV Ab Negative  HSV 1 IgG Positive  HSV 2 IgG Negative  EBV IgG Positive  CMV IgG Positive  VZV IgG Positive  Measles IgG Positive  Mumps IgG Positive  Rubella IgG Positive  Quantiferon Gold Negative  HIV Ag/Ab by [facility] Negative  Syphilis Screen Negative  Toxoplasma IgG Negative  Strongyloides Ab Negative  Trypanosoma cruzi Ab Negative  --ID clearance for lung transplant pending treatment of abdominal wall abscess    #Encounter to Vaccinate Patient  COVID19: Would benefit from COVID19 [id number] Vaccine Dose  Influenza: needs this , can be given at pulmonology office, deferring today not to give 2 reactogenic vaccines  RSV: Arexvy 3/6/25  Pneumococcal: States she had pneumococcal vaccination ~2022  HAV: Would benefit from Havrix  HBV: Heplisav Dose 1 3/6/25. Dose 2 of Heplisav prior to discharge  MMR: Rubella IgG pending. Mumps and Measles immune  Varicella: Immune will not require further vaccination  Shingles: Will require Shingrix  Tdap: Will require Tdap    Dr. Margo Ash will be covering the patient starting from tomorrow. Please reach out to her for further questions and follow up.     Rogelio Gonsales M.D.  [facility] Division of Infectious Disease  8AM-5PM Monday - Friday: Available on [employer] Teams  After Hours and Holidays (or if no response on [employer] Teams): Please contact the Infectious Diseases Office at [phone]     The above assessment and plan were discussed with medicine resident

## 2025-04-04 NOTE — PROGRESS NOTE ADULT - ATTENDING COMMENTS
44F w/ PMH of pHTN on Remodulin sq, sildenafil, UE DVT on Xarelto, Asthma, CHF  She is also following with transplant pulmonary and is finalizing pre-transplant evaluation.  Comes to the ED with abdominal abscesses. She was treated for this in the outpatient setting with doxycycline for the past 5 days without improvement.  Now being admitted for I&D and likely IV abx.  Skin lesions first started about 2 weeks ago. No fevers, chills, however local tenderness.  From the pulmonary standpoint she is clinically at her baseline and without complaints.  Abdominal CT showed abdominal wall abscess and GGO in lung bases, seen previously on CT in 10/24 and may represent mosaic attenuation from PH.   She had debridement of abscess with cultures sent.   Today she is afebrile with stable VS. Had some lightheadedness this morning which she attributes to not eating and taking pain meds.   Lungs are clear on exam, VSS she is afebrile  previously grew MRSA from abdominal wall abscess in 2/2025. Cultures pending  Continue Linezolid as per ID.  Agree with plan as outlined above.

## 2025-04-04 NOTE — PROGRESS NOTE ADULT - SUBJECTIVE AND OBJECTIVE BOX
Surgery Progress Note    SUBJECTIVE: Pt seen and examined at bedside. Patient comfortable and in no-apparent distress. S/p I&D of abdominal wall abscess 4/3.    Vital Signs Last 24 Hrs  T(C): 36.8 (04 Apr 2025 04:09), Max: 37 (04 Apr 2025 00:09)  T(F): 98.3 (04 Apr 2025 04:09), Max: 98.6 (04 Apr 2025 00:09)  HR: 81 (04 Apr 2025 08:38) (81 - 105)  BP: 89/60 (04 Apr 2025 08:38) (89/60 - 104/70)  BP(mean): --  RR: 19 (04 Apr 2025 08:38) (18 - 19)  SpO2: 97% (04 Apr 2025 08:38) (94% - 100%)    Parameters below as of 04 Apr 2025 08:38  Patient On (Oxygen Delivery Method): nasal cannula  O2 Flow (L/min): 2      Physical Exam:  General Appearance: NAD  Respiratory: breathing comfortably on nasal cannula  CV: Pulse regularly present  Abdomen: Soft, abscess site packing removed, clean, no pus or blood, appropriately tender to palpation    LABS:                        11.3   7.55  )-----------( 337      ( 04 Apr 2025 06:19 )             36.9     04-04    135  |  101  |  7   ----------------------------<  105[H]  4.1   |  18[L]  |  0.86    Ca    8.3[L]      04 Apr 2025 06:18  Phos  3.7     04-04  Mg     2.0     04-04    TPro  7.3  /  Alb  3.8  /  TBili  0.5  /  DBili  x   /  AST  16  /  ALT  10  /  AlkPhos  64  04-02    PT/INR - ( 02 Apr 2025 13:35 )   PT: 13.0 sec;   INR: 1.14 ratio         PTT - ( 02 Apr 2025 14:27 )  PTT:36.8 sec  Urinalysis Basic - ( 04 Apr 2025 06:18 )    Color: x / Appearance: x / SG: x / pH: x  Gluc: 105 mg/dL / Ketone: x  / Bili: x / Urobili: x   Blood: x / Protein: x / Nitrite: x   Leuk Esterase: x / RBC: x / WBC x   Sq Epi: x / Non Sq Epi: x / Bacteria: x        INs and OUTs:    04-03-25 @ 07:01  -  04-04-25 @ 07:00  --------------------------------------------------------  IN: 840 mL / OUT: 0 mL / NET: 840 mL

## 2025-04-04 NOTE — PROGRESS NOTE ADULT - PROBLEM SELECTOR PLAN 1
Sent in by outside pulmonary doctor for concern that abscess was not resolving   Patient has Remodulin pump in the area of the abscess   Started two weeks ago and was started on doxycycline   Denies fevers, chills, vomiting, myalgias during this time period   Had chronic diarrhea and occasional vomiting as a known side effects of some of her medications   Not currently meeting SIRS criteria or septic   Patient is allergic to vancomycin   - CT abdomen pelvis Peripherally enhancing lesion in the right mid abdominal wall subjacent to diffuse skin thickening measuring 5.0 x 2.1 x 4.0 cm consistent with phlegmon/abscess  Superficial skin thickening in the left abdominal wall at the level of the umbilicus. Underlying small abscess measures 8 mm.    PLAN  - Linezolid 600 Q12 hours for now   - If patient begins to decompensate, can switch to Daptomycin 6mg/kg  - Blood cultures x2 - pending  - F/u wound cultures Sent in by outside pulmonary doctor for concern that abscess was not resolving   Patient has Remodulin pump in the area of the abscess   Started two weeks ago and was started on doxycycline   Denies fevers, chills, vomiting, myalgias during this time period   Had chronic diarrhea and occasional vomiting as a known side effects of some of her medications   Not currently meeting SIRS criteria or septic   Patient is allergic to vancomycin   - CT abdomen pelvis Peripherally enhancing lesion in the right mid abdominal wall subjacent to diffuse skin thickening measuring 5.0 x 2.1 x 4.0 cm consistent with phlegmon/abscess  Superficial skin thickening in the left abdominal wall at the level of the umbilicus. Underlying small abscess measures 8 mm.    PLAN  - Linezolid 600 Q12 hours for now   - If patient begins to decompensate, can switch to Daptomycin 6mg/kg  - Blood cultures x2 - NGTD  - F/u wound cultures done 4/4  - s/p I&D - ID following

## 2025-04-04 NOTE — PROGRESS NOTE ADULT - PROBLEM SELECTOR PLAN 4
On Xarelto 15 outpatient for previus DVT     PLAN  - hold for now pending surgery Eval  - restart after I&D pending On Xarelto 15 outpatient for previus DVT     PLAN  - restarting per I&D On Xarelto 15 outpatient for previus DVT     PLAN  - restarting per surgery

## 2025-04-04 NOTE — DISCHARGE NOTE PROVIDER - NSDCCPCAREPLAN_GEN_ALL_CORE_FT
PRINCIPAL DISCHARGE DIAGNOSIS  Diagnosis: Abdominal visceral abscess  Assessment and Plan of Treatment: You came to the hospital because you had a painful lump growing under the skin of your belly for two weeks that didn't get better with antibiotics you took by mouth. The doctors determined it was a large abscess (a pocket of infection). They made a small cut to drain the infection and gave you different antibiotics through your IV. They also took a sample to test which specific germ is causing the infection.  You will be going home with XXX for XX weeks and following up with infectious disease outpatient   What to watch for when you go home:  Increased pain or swelling: If the area where the abscess was drained becomes more painful, red, swollen, or starts to leak pus, it could mean the infection is returning.  Fever: A temperature of 100.4°F (38°C) or higher could indicate infection.  General feeling of being unwell: If you start feeling much worse overall, like you have the flu, it could be a sign of a spreading infection.  If you experience any of these, please contact your doctor or return to the hospital immediately.      SECONDARY DISCHARGE DIAGNOSES  Diagnosis: Hypotension  Assessment and Plan of Treatment: You had a brief episode of low blood pressure, which is common for you. Because of your pulmonary hypertension, you weren't given extra fluids through your IV. We encouraged you to drink plenty of water to help keep your blood pressure stable.  **What to watch for when you go home:**  Dizziness or lightheadedness:  These can be signs of low blood pressure.  Fainting or near-fainting:  If you feel like you might faint, lie down and elevate your legs.  *Blurred vision or weakness: These can also be symptoms of low blood pressure.  If you experience any of these symptoms, especially if they are severe or persistent, contact your doctor immediately.  Make sure you are drinking plenty of fluids, especially water, throughout the day.  Avoid activities that could lead to dehydration, especially in hot weather.      Diagnosis: Pulmonary hypertension  Assessment and Plan of Treatment: Your lungs have high blood pressure (pulmonary hypertension), which is why the lung specialist saw you. They recommended that you continue taking all your regular medications for this condition.  **What to watch for when you go home:**  * **Shortness of breath:**  Pay attention to any worsening or new shortness of breath, especially with activity.  * **Chest pain:**  Although not always present, chest pain can be a symptom of pulmonary hypertension.  * **Fatigue or weakness:**  Increased fatigue or weakness can be a sign that your pulmonary hypertension is not well controlled.  * **Swelling in your ankles or legs:**  This can indicate fluid buildup, which can be associated with heart or lung problems.  If you experience any of these symptoms, especially if they worsen significantly, contact your doctor immediately.  It's important to follow up with your lung specialist as scheduled and to take your medications as prescribed.       PRINCIPAL DISCHARGE DIAGNOSIS  Diagnosis: Abdominal visceral abscess  Assessment and Plan of Treatment: You came to the hospital because you had a painful lump growing under the skin of your belly for two weeks that didn't get better with antibiotics you took by mouth. The doctors determined it was a large abscess (a pocket of infection). They made a small cut to drain the infection and gave you different antibiotics through your IV. They also took a sample to test which specific germ is causing the infection.  You will be going home with oral linezolid 600 mg twice a day for 10 more days (4/7/25 to 4/16/25)  please be sure to follow up with infectious disease Dr Ash outpatient in 1 week   What to watch for when you go home:  Increased pain or swelling: If the area where the abscess was drained becomes more painful, red, swollen, or starts to leak pus, it could mean the infection is returning.  Fever: A temperature of 100.4°F (38°C) or higher could indicate infection.  General feeling of being unwell: If you start feeling much worse overall, like you have the flu, it could be a sign of a spreading infection.  If you experience any of these, please contact your doctor or return to the hospital immediately.      SECONDARY DISCHARGE DIAGNOSES  Diagnosis: Hypotension  Assessment and Plan of Treatment: You had a brief episode of low blood pressure, which is common for you. Because of your pulmonary hypertension, you weren't given extra fluids through your IV. We encouraged you to drink plenty of water to help keep your blood pressure stable.  **What to watch for when you go home:**  Dizziness or lightheadedness:  These can be signs of low blood pressure.  Fainting or near-fainting:  If you feel like you might faint, lie down and elevate your legs.  *Blurred vision or weakness: These can also be symptoms of low blood pressure.  If you experience any of these symptoms, especially if they are severe or persistent, contact your doctor immediately.  Make sure you are drinking plenty of fluids, especially water, throughout the day.  Avoid activities that could lead to dehydration, especially in hot weather.      Diagnosis: Pulmonary hypertension  Assessment and Plan of Treatment: Your lungs have high blood pressure (pulmonary hypertension), which is why the lung specialist saw you. They recommended that you continue taking all your regular medications for this condition.  **What to watch for when you go home:**  * **Shortness of breath:**  Pay attention to any worsening or new shortness of breath, especially with activity.  * **Chest pain:**  Although not always present, chest pain can be a symptom of pulmonary hypertension.  * **Fatigue or weakness:**  Increased fatigue or weakness can be a sign that your pulmonary hypertension is not well controlled.  * **Swelling in your ankles or legs:**  This can indicate fluid buildup, which can be associated with heart or lung problems.  If you experience any of these symptoms, especially if they worsen significantly, contact your doctor immediately.  It's important to follow up with your lung specialist as scheduled and to take your medications as prescribed.       PRINCIPAL DISCHARGE DIAGNOSIS  Diagnosis: Abdominal visceral abscess  Assessment and Plan of Treatment: You came to the hospital because you had a painful lump growing under the skin of your belly for two weeks that didn't get better with antibiotics you took by mouth. The doctors determined it was a large abscess (a pocket of infection). They made a small cut to drain the infection and gave you different antibiotics through your IV. They also took a sample to test which specific germ is causing the infection.   Initially started on Linezolid switched to Daptomycin. S/p bedside aspiration of LLQ collection 4/13 Cx growing Pseudomonas, switched to Cefepime. Currently ongoing lung transplant and penicillin desensitization testing conducted on 4/22.   please be sure to follow up with infectious disease Dr Ash outpatient in 1 week   What to watch for when you go home:  Increased pain or swelling: If the area where the abscess was drained becomes more painful, red, swollen, or starts to leak pus, it could mean the infection is returning.  Fever: A temperature of 100.4°F (38°C) or higher could indicate infection.  General feeling of being unwell: If you start feeling much worse overall, like you have the flu, it could be a sign of a spreading infection.  If you experience any of these, please contact your doctor or return to the hospital immediately.      SECONDARY DISCHARGE DIAGNOSES  Diagnosis: Hypotension  Assessment and Plan of Treatment: You had a brief episode of low blood pressure, which is common for you. Because of your pulmonary hypertension, you weren't given extra fluids through your IV. We encouraged you to drink plenty of water to help keep your blood pressure stable.  **What to watch for when you go home:**  Dizziness or lightheadedness:  These can be signs of low blood pressure.  Fainting or near-fainting:  If you feel like you might faint, lie down and elevate your legs.  *Blurred vision or weakness: These can also be symptoms of low blood pressure.  If you experience any of these symptoms, especially if they are severe or persistent, contact your doctor immediately.  Make sure you are drinking plenty of fluids, especially water, throughout the day.  Avoid activities that could lead to dehydration, especially in hot weather.      Diagnosis: Pulmonary hypertension  Assessment and Plan of Treatment: Your lungs have high blood pressure (pulmonary hypertension), which is why the lung specialist saw you. They recommended that you continue taking all your regular medications for this condition.  **What to watch for when you go home:**  * **Shortness of breath:**  Pay attention to any worsening or new shortness of breath, especially with activity.  * **Chest pain:**  Although not always present, chest pain can be a symptom of pulmonary hypertension.  * **Fatigue or weakness:**  Increased fatigue or weakness can be a sign that your pulmonary hypertension is not well controlled.  * **Swelling in your ankles or legs:**  This can indicate fluid buildup, which can be associated with heart or lung problems.  If you experience any of these symptoms, especially if they worsen significantly, contact your doctor immediately.  It's important to follow up with your lung specialist as scheduled and to take your medications as prescribed.       PRINCIPAL DISCHARGE DIAGNOSIS  Diagnosis: Abdominal visceral abscess  Assessment and Plan of Treatment: You came to the hospital because you had a painful lump growing under the skin of your belly for two weeks that didn't get better with antibiotics you took by mouth. The doctors determined it was a large abscess (a pocket of infection). They made a small cut to drain the infection and gave you different antibiotics through your IV. They also took a sample to test which specific germ is causing the infection.   Initially started on Linezolid switched to Daptomycin. S/p bedside aspiration of LLQ collection 4/13 Cx growing Pseudomonas, switched to Cefepime. Currently ongoing lung transplant and penicillin desensitization testing conducted on 4/22.   please be sure to follow up with infectious disease Dr Ash outpatient in 1 week   What to watch for when you go home:  Increased pain or swelling: If the area where the abscess was drained becomes more painful, red, swollen, or starts to leak pus, it could mean the infection is returning.  Fever: A temperature of 100.4°F (38°C) or higher could indicate infection.  General feeling of being unwell: If you start feeling much worse overall, like you have the flu, it could be a sign of a spreading infection.  If you experience any of these, please contact your doctor or return to the hospital immediately.      SECONDARY DISCHARGE DIAGNOSES  Diagnosis: Hypotension  Assessment and Plan of Treatment: You had a brief episode of low blood pressure, which is common for you. Because of your pulmonary hypertension, you weren't given extra fluids through your IV. We encouraged you to drink plenty of water to help keep your blood pressure stable.  **What to watch for when you go home:**  Dizziness or lightheadedness:  These can be signs of low blood pressure.  Fainting or near-fainting:  If you feel like you might faint, lie down and elevate your legs.  *Blurred vision or weakness: These can also be symptoms of low blood pressure.  If you experience any of these symptoms, especially if they are severe or persistent, contact your doctor immediately.  Make sure you are drinking plenty of fluids, especially water, throughout the day.  Avoid activities that could lead to dehydration, especially in hot weather.      Diagnosis: Pulmonary hypertension  Assessment and Plan of Treatment: Your lungs have high blood pressure (pulmonary hypertension), which is why the lung specialist saw you. They recommended that you continue taking all your regular medications for this condition.  **What to watch for when you go home:**  * **Shortness of breath:**  Pay attention to any worsening or new shortness of breath, especially with activity.  * **Chest pain:**  Although not always present, chest pain can be a symptom of pulmonary hypertension.  * **Fatigue or weakness:**  Increased fatigue or weakness can be a sign that your pulmonary hypertension is not well controlled.  * **Swelling in your ankles or legs:**  This can indicate fluid buildup, which can be associated with heart or lung problems.  If you experience any of these symptoms, especially if they worsen significantly, contact your doctor immediately.  It's important to follow up with your lung specialist as scheduled and to take your medications as prescribed.  Outpatient follow up with Dr. Yoon      Diagnosis: Abdominal visceral abscess  Assessment and Plan of Treatment: s/p incision and drainage by plastic   Outpatient follow up with Plastic   Dressing change daily    Diagnosis: Right heart failure  Assessment and Plan of Treatment: Weigh yourself daily.  If you gain 3lbs in 3 days, or 5lbs in a week call your Health Care Provider.  Do not eat or drink foods containing more than 2000mg of salt (sodium) in your diet every day.  Call your Health Care Provider if you have any swelling or increased swelling in your feet, ankles, and/or stomach.  Take all of your medication as directed.  If you become dizzy call your Health Care Provider.  Hold Bumex /Aldactone for now   BNP 94 today   Outpatient follow up with cardiology

## 2025-04-04 NOTE — DISCHARGE NOTE PROVIDER - CARE PROVIDERS DIRECT ADDRESSES
[email or web address]

## 2025-04-04 NOTE — PROGRESS NOTE ADULT - PROBLEM SELECTOR PLAN 2
At home takes Atrovert nebulizer, spiriva, pulmicort?  Pulmonary medicine is following     PLAN  - continue therapeutic interchange atrovent nebulizer ipratropium PRN   - continue home equivalent pulmicort, spiriva, PRN nebs  - continue flonase  - continue on baseline 2L O2   - continue to watch respiratory status At home takes Atrovent nebulizer, Spiriva, Pulmicort?  Pulmonary medicine is following     PLAN  - continue therapeutic interchange Atrovent nebulizer ipratropium PRN   - continue home equivalent Pulmicort, Spiriva, PRN nebs  - continue Flonase  - continue on baseline 2L O2   - continue to watch respiratory status

## 2025-04-04 NOTE — DISCHARGE NOTE PROVIDER - NSDCFUADDAPPT_GEN_ALL_CORE_FT
APPTS ARE READY TO BE MADE: [ ] YES    Best Family or Patient Contact (if needed):    Additional Information about above appointments (if needed):    1: pulm?  2: ID?  3:     Other comments or requests:    APPTS ARE READY TO BE MADE: [X] YES    Best Family or Patient Contact (if needed):    Additional Information about above appointments (if needed):    1: Dr. Yoon for pulmonary   2: Dr. Ash in ID  3:     Other comments or requests:    APPTS ARE READY TO BE MADE: [X] YES    Best Family or Patient Contact (if needed):    Additional Information about above appointments (if needed):    1: Dr. Yoon for pulmonary   2: Dr. Ash in ID  3:     Other comments or requests:     Prior to outreaching the patient, it was visible that the patient has secured a follow up appointment which was not scheduled by our team. Patient is scheduled with Dr. Yoon on 4/25 at [address] [place] [address]

## 2025-04-04 NOTE — PROGRESS NOTE ADULT - ASSESSMENT
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 Patient p/w right sided abdominal pain x2 weeks and recurrent c/f abscess, was started on doxycycline outpatient however pain and swelling have worsened. Now s/p I&D of abdominal wall abscess 4/3.    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 Patient p/w right sided abdominal pain x2 weeks and recurrent c/f abscess, was started on doxycycline outpatient however pain and swelling have worsened. Now s/p I&D of abdominal wall abscess 4/3.    Recommendations:  - okay to restart Xarelto today  - wound care nursing orders for packing changes placed    please reconsult as needed    ACS  [id number]

## 2025-04-04 NOTE — PROGRESS NOTE ADULT - ASSESSMENT
44y F, with PMH of pHTN (on treprostinil infusion still continuing ), Right  Heart failure(s/p PPM dual chamber ), chronic PE (dx ) on DOAC, SVT (s/p ablation 2020), asthma, h/o MRSA abdominal wall abscesses last , and JULIA presenting for treatement of new abdominal abscess s/p I&D pending ID consult 44y F, with PMH of pHTN (on treprostinil infusion still continuing ), Right  Heart failure(s/p PPM dual chamber ), chronic PE (dx ) on DOAC, SVT (s/p ablation 2020), asthma, h/o MRSA abdominal wall abscesses last , and JULIA presenting for treatment of new abdominal abscess s/p I&D drainage on abx treatment. 44y F, with PMH of pHTN (on treprostinil infusion still continuing ), Right  Heart failure(s/p PPM dual chamber ), chronic PE (dx ) on DOAC, SVT (s/p ablation 2020), asthma, h/o MRSA abdominal wall abscesses last , and JULIA presenting for treatment of new abdominal abscess s/p I&D drainage on abx treatment. pending wound cultures and home abx course

## 2025-04-05 LAB
-  CLINDAMYCIN: SIGNIFICANT CHANGE UP
-  DAPTOMYCIN: SIGNIFICANT CHANGE UP
-  ERYTHROMYCIN: SIGNIFICANT CHANGE UP
-  GENTAMICIN: SIGNIFICANT CHANGE UP
-  LINEZOLID: SIGNIFICANT CHANGE UP
-  OXACILLIN: SIGNIFICANT CHANGE UP
-  PENICILLIN: SIGNIFICANT CHANGE UP
-  RIFAMPIN: SIGNIFICANT CHANGE UP
-  TETRACYCLINE: SIGNIFICANT CHANGE UP
-  TRIMETHOPRIM/SULFAMETHOXAZOLE: SIGNIFICANT CHANGE UP
-  VANCOMYCIN: SIGNIFICANT CHANGE UP
ANION GAP SERPL CALC-SCNC: 12 MMOL/L — SIGNIFICANT CHANGE UP (ref 5–17)
BUN SERPL-MCNC: 6 MG/DL — LOW (ref 7–23)
CALCIUM SERPL-MCNC: 8.1 MG/DL — LOW (ref 8.4–10.5)
CHLORIDE SERPL-SCNC: 101 MMOL/L — SIGNIFICANT CHANGE UP (ref 96–108)
CHOLEST SERPL-MCNC: 126 MG/DL — SIGNIFICANT CHANGE UP
CO2 SERPL-SCNC: 23 MMOL/L — SIGNIFICANT CHANGE UP (ref 22–31)
CREAT SERPL-MCNC: 0.91 MG/DL — SIGNIFICANT CHANGE UP (ref 0.5–1.3)
EGFR: 80 ML/MIN/1.73M2 — SIGNIFICANT CHANGE UP
EGFR: 80 ML/MIN/1.73M2 — SIGNIFICANT CHANGE UP
GLUCOSE SERPL-MCNC: 75 MG/DL — SIGNIFICANT CHANGE UP (ref 70–99)
HCT VFR BLD CALC: 38.2 % — SIGNIFICANT CHANGE UP (ref 34.5–45)
HDLC SERPL-MCNC: 37 MG/DL — LOW
HGB BLD-MCNC: 11.9 G/DL — SIGNIFICANT CHANGE UP (ref 11.5–15.5)
LDLC SERPL-MCNC: 76 MG/DL — SIGNIFICANT CHANGE UP
LIPID PNL WITH DIRECT LDL SERPL: 76 MG/DL — SIGNIFICANT CHANGE UP
MCHC RBC-ENTMCNC: 22.1 PG — LOW (ref 27–34)
MCHC RBC-ENTMCNC: 31.2 G/DL — LOW (ref 32–36)
MCV RBC AUTO: 71 FL — LOW (ref 80–100)
METHOD TYPE: SIGNIFICANT CHANGE UP
NONHDLC SERPL-MCNC: 89 MG/DL — SIGNIFICANT CHANGE UP
NRBC BLD AUTO-RTO: 0 /100 WBCS — SIGNIFICANT CHANGE UP (ref 0–0)
PHOSPHATE SERPL-MCNC: 3.1 MG/DL — SIGNIFICANT CHANGE UP (ref 2.5–4.5)
PLATELET # BLD AUTO: 363 K/UL — SIGNIFICANT CHANGE UP (ref 150–400)
POTASSIUM SERPL-MCNC: 3.6 MMOL/L — SIGNIFICANT CHANGE UP (ref 3.5–5.3)
POTASSIUM SERPL-SCNC: 3.6 MMOL/L — SIGNIFICANT CHANGE UP (ref 3.5–5.3)
RBC # BLD: 5.38 M/UL — HIGH (ref 3.8–5.2)
RBC # FLD: 16.7 % — HIGH (ref 10.3–14.5)
SODIUM SERPL-SCNC: 136 MMOL/L — SIGNIFICANT CHANGE UP (ref 135–145)
TRIGL SERPL-MCNC: 58 MG/DL — SIGNIFICANT CHANGE UP
WBC # BLD: 6.11 K/UL — SIGNIFICANT CHANGE UP (ref 3.8–10.5)
WBC # FLD AUTO: 6.11 K/UL — SIGNIFICANT CHANGE UP (ref 3.8–10.5)

## 2025-04-05 PROCEDURE — 99232 SBSQ HOSP IP/OBS MODERATE 35: CPT | Mod: GC

## 2025-04-05 RX ORDER — ACETAMINOPHEN 500 MG/5ML
1000 LIQUID (ML) ORAL ONCE
Refills: 0 | Status: DISCONTINUED | OUTPATIENT
Start: 2025-04-05 | End: 2025-04-05

## 2025-04-05 RX ORDER — TRAMADOL HYDROCHLORIDE AND ACETAMINOPHEN 37.5; 325 MG/1; MG/1
1 TABLET ORAL ONCE
Refills: 0 | Status: DISCONTINUED | OUTPATIENT
Start: 2025-04-05 | End: 2025-04-05

## 2025-04-05 RX ORDER — ONDANSETRON HCL/PF 4 MG/2 ML
4 VIAL (ML) INJECTION ONCE
Refills: 0 | Status: COMPLETED | OUTPATIENT
Start: 2025-04-05 | End: 2025-04-05

## 2025-04-05 RX ADMIN — Medication 0.5 MILLIGRAM(S): at 14:05

## 2025-04-05 RX ADMIN — SILDENAFIL 20 MILLIGRAM(S): 50 TABLET, FILM COATED ORAL at 05:12

## 2025-04-05 RX ADMIN — Medication 40 MILLIEQUIVALENT(S): at 11:39

## 2025-04-05 RX ADMIN — SILDENAFIL 20 MILLIGRAM(S): 50 TABLET, FILM COATED ORAL at 21:21

## 2025-04-05 RX ADMIN — FLUTICASONE PROPIONATE 1 SPRAY(S): 50 SPRAY, METERED NASAL at 17:04

## 2025-04-05 RX ADMIN — Medication 0.5 MILLIGRAM(S): at 00:15

## 2025-04-05 RX ADMIN — LINEZOLID 300 MILLIGRAM(S): 2 INJECTION, SOLUTION INTRAVENOUS at 03:05

## 2025-04-05 RX ADMIN — Medication 4 MILLIGRAM(S): at 06:53

## 2025-04-05 RX ADMIN — Medication 1000 MILLIGRAM(S): at 17:41

## 2025-04-05 RX ADMIN — SILDENAFIL 20 MILLIGRAM(S): 50 TABLET, FILM COATED ORAL at 13:42

## 2025-04-05 RX ADMIN — Medication 0.5 MILLIGRAM(S): at 05:12

## 2025-04-05 RX ADMIN — RIVAROXABAN 15 MILLIGRAM(S): 10 TABLET, FILM COATED ORAL at 17:04

## 2025-04-05 RX ADMIN — Medication 1000 MILLIGRAM(S): at 08:30

## 2025-04-05 RX ADMIN — FLUTICASONE PROPIONATE 1 SPRAY(S): 50 SPRAY, METERED NASAL at 05:12

## 2025-04-05 RX ADMIN — Medication 0.5 MILLIGRAM(S): at 13:48

## 2025-04-05 RX ADMIN — Medication 1000 MILLIGRAM(S): at 18:27

## 2025-04-05 RX ADMIN — MONTELUKAST SODIUM 10 MILLIGRAM(S): 10 TABLET ORAL at 11:40

## 2025-04-05 RX ADMIN — TRAMADOL HYDROCHLORIDE AND ACETAMINOPHEN 1 TABLET(S): 37.5; 325 TABLET ORAL at 23:08

## 2025-04-05 RX ADMIN — Medication 0.5 MILLIGRAM(S): at 19:47

## 2025-04-05 RX ADMIN — Medication 1000 MILLIGRAM(S): at 09:30

## 2025-04-05 RX ADMIN — Medication 0.5 MILLIGRAM(S): at 19:52

## 2025-04-05 RX ADMIN — LINEZOLID 300 MILLIGRAM(S): 2 INJECTION, SOLUTION INTRAVENOUS at 16:00

## 2025-04-05 RX ADMIN — Medication 40 MILLIGRAM(S): at 05:57

## 2025-04-05 NOTE — PROGRESS NOTE ADULT - PROBLEM SELECTOR PLAN 1
Sent in by outside pulmonary doctor for concern that abscess was not resolving   Patient has Remodulin pump in the area of the abscess   Started two weeks ago and was started on doxycycline   Denies fevers, chills, vomiting, myalgias during this time period   Had chronic diarrhea and occasional vomiting as a known side effects of some of her medications   Not currently meeting SIRS criteria or septic   Patient is allergic to vancomycin   - CT abdomen pelvis Peripherally enhancing lesion in the right mid abdominal wall subjacent to diffuse skin thickening measuring 5.0 x 2.1 x 4.0 cm consistent with phlegmon/abscess  Superficial skin thickening in the left abdominal wall at the level of the umbilicus. Underlying small abscess measures 8 mm.    PLAN  - Linezolid 600 Q12 hours for now   - If patient begins to decompensate, can switch to Daptomycin 6mg/kg  - Blood cultures x2 - NGTD  - F/u wound cultures done 4/4  - s/p I&D - ID following

## 2025-04-05 NOTE — PROGRESS NOTE ADULT - PROBLEM SELECTOR PLAN 5
2/2 to pulmonary hypertension   Seen by Dr Yoon outpatient       PLAN  - Hold home nifedipine for low BP  - continue home Bumex and aldactone per pulm

## 2025-04-05 NOTE — PROGRESS NOTE ADULT - PROBLEM SELECTOR PLAN 2
At home takes Atrovent nebulizer, Spiriva, Pulmicort?  Pulmonary medicine is following     PLAN  - continue therapeutic interchange Atrovent nebulizer ipratropium PRN   - continue home equivalent Pulmicort, Spiriva, PRN nebs  - continue Flonase  - continue on baseline 2L O2   - continue to watch respiratory status

## 2025-04-05 NOTE — PROGRESS NOTE ADULT - SUBJECTIVE AND OBJECTIVE BOX
Internal Medicine Progress Note    Patient is a 44y old  Female who presents with a chief complaint of MRSA Abdominal Wall Abscess (2025 11:06)    OVERNIGHT EVENTS/SUBJECTIVE:    OBJECTIVE:  Vital Signs Last 24 Hrs  T(C): 36.6 (2025 04:20), Max: 36.8 (2025 23:43)  T(F): 97.8 (2025 04:20), Max: 98.2 (2025 23:43)  HR: 93 (2025 04:20) (81 - 101)  BP: 103/69 (2025 04:20) (88/62 - 104/70)  BP(mean): --  RR: 18 (2025 04:20) (18 - 19)  SpO2: 96% (2025 04:20) (96% - 100%)    Parameters below as of 2025 04:20  Patient On (Oxygen Delivery Method): nasal cannula  O2 Flow (L/min): 2    I&O's Detail    2025 07:01  -  2025 07:00  --------------------------------------------------------  IN:    Oral Fluid: 1120 mL  Total IN: 1120 mL    OUT:    Voided (mL): 700 mL  Total OUT: 700 mL    Total NET: 420 mL        Daily     Daily Weight in k.6 (2025 14:49)  Physical Exam:  General: NAD, resting comfortably in bed  Neuro: A&Ox4, 5/5 strength in all ext  HEENT: NC/AT, EOMI, normal hearing, oral mucosa moist, no oral lesions noted, no pharyngeal erythema, uvula midline  Neck: supple, thyroid not enlarged, no LAD  Resp: Breathing comfortably on RA, LCTA b/l  CV: Normal sinus rhythm, S1 and S2, no r/m/g  Abd: soft, non-distended, non-tender. No HSM.  Ext: ROMIx4, no edema, +2 pulses bilaterally  Skin: Warm and dry, no rashes or discolorations  Psych: Appropriate affect    Medications:  MEDICATIONS  (STANDING):  buDESOnide    Inhalation Suspension 0.5 milliGRAM(s) Inhalation daily  buMETAnide 1 milliGRAM(s) Oral daily  fluticasone propionate 50 MICROgram(s)/spray Nasal Spray 1 Spray(s) Both Nostrils two times a day  influenza   Vaccine 0.5 milliLiter(s) IntraMuscular once  lidocaine 1%/epinephrine 1:100,000 Inj 20 milliLiter(s) Local Injection once  linezolid  IVPB 600 milliGRAM(s) IV Intermittent every 12 hours  montelukast 10 milliGRAM(s) Oral daily  pantoprazole    Tablet 40 milliGRAM(s) Oral before breakfast  remodulin (treprostinil) SubQ infusion 1 Dose(s) 1 Dose(s) SubCutaneous Continuous Pump  rivaroxaban 15 milliGRAM(s) Oral with dinner  sildenafil (REVATIO) 20 milliGRAM(s) Oral every 8 hours  spironolactone 50 milliGRAM(s) Oral two times a day    MEDICATIONS  (PRN):  acetaminophen     Tablet .. 1000 milliGRAM(s) Oral every 6 hours PRN Mild Pain (1 - 3), Moderate Pain (4 - 6)  albuterol    90 MICROgram(s) HFA Inhaler 2 Puff(s) Inhalation every 6 hours PRN Shortness of Breath  albuterol/ipratropium for Nebulization 3 milliLiter(s) Nebulizer every 6 hours PRN Shortness of Breath  HYDROmorphone  Injectable 0.5 milliGRAM(s) IV Push every 4 hours PRN Severe Pain (7 - 10)      Labs:                        11.3   7.55  )-----------( 337      ( 2025 06:19 )             36.9     -04    135  |  101  |  7   ----------------------------<  105[H]  4.1   |  18[L]  |  0.86    Ca    8.3[L]      2025 06:18  Phos  3.7     04-04  Mg     2.0     04-04        Urinalysis Basic - ( 2025 06:18 )    Color: x / Appearance: x / SG: x / pH: x  Gluc: 105 mg/dL / Ketone: x  / Bili: x / Urobili: x   Blood: x / Protein: x / Nitrite: x   Leuk Esterase: x / RBC: x / WBC x   Sq Epi: x / Non Sq Epi: x / Bacteria: x      SARS-CoV-2: NotDetec (23 Oct 2024 18:12)  SARS-CoV-2: NotDetec (14 Oct 2024 13:58)      Radiology: Internal Medicine Progress Note    Patient is a 44y old  Female who presents with a chief complaint of MRSA Abdominal Wall Abscess (2025 11:06)    OVERNIGHT EVENTS/SUBJECTIVE: No overnight events. Pt feels well besides a mild headache which is resolving. Denies cp, sob, abd pain, n/v/d.     OBJECTIVE:  Vital Signs Last 24 Hrs  T(C): 36.6 (2025 04:20), Max: 36.8 (2025 23:43)  T(F): 97.8 (2025 04:20), Max: 98.2 (2025 23:43)  HR: 93 (2025 04:20) (81 - 101)  BP: 103/69 (2025 04:20) (88/62 - 104/70)  BP(mean): --  RR: 18 (2025 04:20) (18 - 19)  SpO2: 96% (2025 04:20) (96% - 100%)    Parameters below as of 2025 04:20  Patient On (Oxygen Delivery Method): nasal cannula  O2 Flow (L/min): 2    I&O's Detail    2025 07:01  -  2025 07:00  --------------------------------------------------------  IN:    Oral Fluid: 1120 mL  Total IN: 1120 mL    OUT:    Voided (mL): 700 mL  Total OUT: 700 mL    Total NET: 420 mL        Daily     Daily Weight in k.6 (2025 14:49)  Physical Exam:  General: NAD, resting comfortably in bed  Neuro: A&Ox4  HEENT: NC/AT, EOMI, normal hearing, oral mucosa moist  Resp: Breathing comfortably on RA, LCTA b/l  CV: Normal sinus rhythm, S1 and S2, no r/m/g  Abd: soft, non-distended, non-tender. No HSM.  Ext: no edema, +2 pulses bilaterally  Skin: Warm and dry, no rashes or discolorations  Psych: Appropriate affect    Medications:  MEDICATIONS  (STANDING):  buDESOnide    Inhalation Suspension 0.5 milliGRAM(s) Inhalation daily  buMETAnide 1 milliGRAM(s) Oral daily  fluticasone propionate 50 MICROgram(s)/spray Nasal Spray 1 Spray(s) Both Nostrils two times a day  influenza   Vaccine 0.5 milliLiter(s) IntraMuscular once  lidocaine 1%/epinephrine 1:100,000 Inj 20 milliLiter(s) Local Injection once  linezolid  IVPB 600 milliGRAM(s) IV Intermittent every 12 hours  montelukast 10 milliGRAM(s) Oral daily  pantoprazole    Tablet 40 milliGRAM(s) Oral before breakfast  remodulin (treprostinil) SubQ infusion 1 Dose(s) 1 Dose(s) SubCutaneous Continuous Pump  rivaroxaban 15 milliGRAM(s) Oral with dinner  sildenafil (REVATIO) 20 milliGRAM(s) Oral every 8 hours  spironolactone 50 milliGRAM(s) Oral two times a day    MEDICATIONS  (PRN):  acetaminophen     Tablet .. 1000 milliGRAM(s) Oral every 6 hours PRN Mild Pain (1 - 3), Moderate Pain (4 - 6)  albuterol    90 MICROgram(s) HFA Inhaler 2 Puff(s) Inhalation every 6 hours PRN Shortness of Breath  albuterol/ipratropium for Nebulization 3 milliLiter(s) Nebulizer every 6 hours PRN Shortness of Breath  HYDROmorphone  Injectable 0.5 milliGRAM(s) IV Push every 4 hours PRN Severe Pain (7 - 10)      Labs:                        11.3   7.55  )-----------( 337      ( 2025 06:19 )             36.9     -04    135  |  101  |  7   ----------------------------<  105[H]  4.1   |  18[L]  |  0.86    Ca    8.3[L]      2025 06:18  Phos  3.7     04-04  Mg     2.0     04-04        Urinalysis Basic - ( 2025 06:18 )    Color: x / Appearance: x / SG: x / pH: x  Gluc: 105 mg/dL / Ketone: x  / Bili: x / Urobili: x   Blood: x / Protein: x / Nitrite: x   Leuk Esterase: x / RBC: x / WBC x   Sq Epi: x / Non Sq Epi: x / Bacteria: x      SARS-CoV-2: NotDetec (23 Oct 2024 18:12)  SARS-CoV-2: NotDetec (14 Oct 2024 13:58)      Radiology:

## 2025-04-05 NOTE — PROGRESS NOTE ADULT - ATTENDING COMMENTS
Abdominal wall abscess s/p bedside I&D- continue Linezolid- surgery and transplant ID following- wound culture with MRSA, awaiting final culture results from I&D  Chronic hypoxic respiratory failure on home O2 due to pHTN with right sided heart failure- pulmonary following- continue Remodulin, Sildenafil, diuretics  Asthma- continue Pulmicort, Singulair, Duonebs PRN, Flonase  DVT history- continue Xarelto

## 2025-04-05 NOTE — PROGRESS NOTE ADULT - ASSESSMENT
44y F, with PMH of pHTN (on treprostinil infusion still continuing ), Right  Heart failure(s/p PPM dual chamber ), chronic PE (dx ) on DOAC, SVT (s/p ablation 2020), asthma, h/o MRSA abdominal wall abscesses last , and JULIA presenting for treatment of new abdominal abscess s/p I&D drainage on abx treatment. pending wound cultures and home abx course

## 2025-04-06 LAB
ANION GAP SERPL CALC-SCNC: 10 MMOL/L — SIGNIFICANT CHANGE UP (ref 5–17)
BUN SERPL-MCNC: 7 MG/DL — SIGNIFICANT CHANGE UP (ref 7–23)
CALCIUM SERPL-MCNC: 8.3 MG/DL — LOW (ref 8.4–10.5)
CHLORIDE SERPL-SCNC: 104 MMOL/L — SIGNIFICANT CHANGE UP (ref 96–108)
CHOLEST SERPL-MCNC: 122 MG/DL — SIGNIFICANT CHANGE UP
CO2 SERPL-SCNC: 21 MMOL/L — LOW (ref 22–31)
CREAT SERPL-MCNC: 0.95 MG/DL — SIGNIFICANT CHANGE UP (ref 0.5–1.3)
EGFR: 76 ML/MIN/1.73M2 — SIGNIFICANT CHANGE UP
EGFR: 76 ML/MIN/1.73M2 — SIGNIFICANT CHANGE UP
GLUCOSE SERPL-MCNC: 89 MG/DL — SIGNIFICANT CHANGE UP (ref 70–99)
HCT VFR BLD CALC: 37.4 % — SIGNIFICANT CHANGE UP (ref 34.5–45)
HDLC SERPL-MCNC: 37 MG/DL — LOW
HGB BLD-MCNC: 11.4 G/DL — LOW (ref 11.5–15.5)
LDLC SERPL-MCNC: 71 MG/DL — SIGNIFICANT CHANGE UP
LIPID PNL WITH DIRECT LDL SERPL: 71 MG/DL — SIGNIFICANT CHANGE UP
MCHC RBC-ENTMCNC: 22 PG — LOW (ref 27–34)
MCHC RBC-ENTMCNC: 30.5 G/DL — LOW (ref 32–36)
MCV RBC AUTO: 72.2 FL — LOW (ref 80–100)
MRSA PCR RESULT.: SIGNIFICANT CHANGE UP
NONHDLC SERPL-MCNC: 85 MG/DL — SIGNIFICANT CHANGE UP
NRBC BLD AUTO-RTO: 0 /100 WBCS — SIGNIFICANT CHANGE UP (ref 0–0)
PHOSPHATE SERPL-MCNC: 2.7 MG/DL — SIGNIFICANT CHANGE UP (ref 2.5–4.5)
PLATELET # BLD AUTO: 360 K/UL — SIGNIFICANT CHANGE UP (ref 150–400)
POTASSIUM SERPL-MCNC: 4.3 MMOL/L — SIGNIFICANT CHANGE UP (ref 3.5–5.3)
POTASSIUM SERPL-SCNC: 4.3 MMOL/L — SIGNIFICANT CHANGE UP (ref 3.5–5.3)
PROT S FREE PPP-ACNC: 51 % — LOW (ref 63–140)
RBC # BLD: 5.18 M/UL — SIGNIFICANT CHANGE UP (ref 3.8–5.2)
RBC # FLD: 16.5 % — HIGH (ref 10.3–14.5)
S AUREUS DNA NOSE QL NAA+PROBE: SIGNIFICANT CHANGE UP
SODIUM SERPL-SCNC: 135 MMOL/L — SIGNIFICANT CHANGE UP (ref 135–145)
TRIGL SERPL-MCNC: 69 MG/DL — SIGNIFICANT CHANGE UP
WBC # BLD: 6.45 K/UL — SIGNIFICANT CHANGE UP (ref 3.8–10.5)
WBC # FLD AUTO: 6.45 K/UL — SIGNIFICANT CHANGE UP (ref 3.8–10.5)

## 2025-04-06 PROCEDURE — 99232 SBSQ HOSP IP/OBS MODERATE 35: CPT

## 2025-04-06 PROCEDURE — 99232 SBSQ HOSP IP/OBS MODERATE 35: CPT | Mod: GC

## 2025-04-06 RX ORDER — SILDENAFIL 50 MG/1
20 TABLET, FILM COATED ORAL EVERY 8 HOURS
Refills: 0 | Status: DISCONTINUED | OUTPATIENT
Start: 2025-04-07 | End: 2025-04-08

## 2025-04-06 RX ORDER — ONDANSETRON HCL/PF 4 MG/2 ML
4 VIAL (ML) INJECTION ONCE
Refills: 0 | Status: COMPLETED | OUTPATIENT
Start: 2025-04-06 | End: 2025-04-06

## 2025-04-06 RX ORDER — LIDOCAINE HYDROCHLORIDE 20 MG/ML
1 JELLY TOPICAL DAILY
Refills: 0 | Status: DISCONTINUED | OUTPATIENT
Start: 2025-04-06 | End: 2025-04-13

## 2025-04-06 RX ORDER — MELATONIN 5 MG
5 TABLET ORAL AT BEDTIME
Refills: 0 | Status: DISCONTINUED | OUTPATIENT
Start: 2025-04-06 | End: 2025-04-10

## 2025-04-06 RX ORDER — LINEZOLID 2 MG/ML
1 INJECTION, SOLUTION INTRAVENOUS
Qty: 20 | Refills: 0
Start: 2025-04-06 | End: 2025-04-15

## 2025-04-06 RX ORDER — LINEZOLID 2 MG/ML
600 INJECTION, SOLUTION INTRAVENOUS EVERY 12 HOURS
Refills: 0 | Status: DISCONTINUED | OUTPATIENT
Start: 2025-04-06 | End: 2025-04-11

## 2025-04-06 RX ORDER — LINEZOLID 2 MG/ML
1 INJECTION, SOLUTION INTRAVENOUS
Qty: 22 | Refills: 0
Start: 2025-04-06 | End: 2025-04-16

## 2025-04-06 RX ADMIN — MONTELUKAST SODIUM 10 MILLIGRAM(S): 10 TABLET ORAL at 11:50

## 2025-04-06 RX ADMIN — SILDENAFIL 20 MILLIGRAM(S): 50 TABLET, FILM COATED ORAL at 06:55

## 2025-04-06 RX ADMIN — SILDENAFIL 20 MILLIGRAM(S): 50 TABLET, FILM COATED ORAL at 21:07

## 2025-04-06 RX ADMIN — Medication 4 MILLIGRAM(S): at 05:50

## 2025-04-06 RX ADMIN — Medication 0.5 MILLIGRAM(S): at 08:26

## 2025-04-06 RX ADMIN — LINEZOLID 300 MILLIGRAM(S): 2 INJECTION, SOLUTION INTRAVENOUS at 03:46

## 2025-04-06 RX ADMIN — LINEZOLID 600 MILLIGRAM(S): 2 INJECTION, SOLUTION INTRAVENOUS at 16:16

## 2025-04-06 RX ADMIN — Medication 50 MILLIGRAM(S): at 08:26

## 2025-04-06 RX ADMIN — LIDOCAINE HYDROCHLORIDE 1 PATCH: 20 JELLY TOPICAL at 20:25

## 2025-04-06 RX ADMIN — SILDENAFIL 20 MILLIGRAM(S): 50 TABLET, FILM COATED ORAL at 13:04

## 2025-04-06 RX ADMIN — BUMETANIDE 1 MILLIGRAM(S): 1 TABLET ORAL at 08:26

## 2025-04-06 RX ADMIN — FLUTICASONE PROPIONATE 1 SPRAY(S): 50 SPRAY, METERED NASAL at 17:16

## 2025-04-06 RX ADMIN — FLUTICASONE PROPIONATE 1 SPRAY(S): 50 SPRAY, METERED NASAL at 06:56

## 2025-04-06 RX ADMIN — Medication 0.5 MILLIGRAM(S): at 14:42

## 2025-04-06 RX ADMIN — RIVAROXABAN 15 MILLIGRAM(S): 10 TABLET, FILM COATED ORAL at 17:16

## 2025-04-06 RX ADMIN — Medication 0.5 MILLIGRAM(S): at 00:36

## 2025-04-06 RX ADMIN — LIDOCAINE HYDROCHLORIDE 1 PATCH: 20 JELLY TOPICAL at 11:49

## 2025-04-06 RX ADMIN — Medication 0.5 MILLIGRAM(S): at 08:40

## 2025-04-06 RX ADMIN — Medication 40 MILLIGRAM(S): at 06:54

## 2025-04-06 RX ADMIN — IPRATROPIUM BROMIDE AND ALBUTEROL SULFATE 3 MILLILITER(S): .5; 2.5 SOLUTION RESPIRATORY (INHALATION) at 05:04

## 2025-04-06 RX ADMIN — Medication 0.5 MILLIGRAM(S): at 01:32

## 2025-04-06 RX ADMIN — TRAMADOL HYDROCHLORIDE AND ACETAMINOPHEN 1 TABLET(S): 37.5; 325 TABLET ORAL at 00:38

## 2025-04-06 RX ADMIN — Medication 0.5 MILLIGRAM(S): at 15:00

## 2025-04-06 RX ADMIN — Medication 0.5 MILLIGRAM(S): at 21:08

## 2025-04-06 RX ADMIN — Medication 1 APPLICATION(S): at 11:50

## 2025-04-06 RX ADMIN — Medication 0.5 MILLIGRAM(S): at 20:22

## 2025-04-06 NOTE — PROGRESS NOTE ADULT - ATTENDING COMMENTS
Abdominal wall abscess s/p bedside I&D- on Linezolid- culture results demonstrate sensitivity to multiple oral options including Linezolid, tetracycline, and Bactrim- await ID f/u  Chronic hypoxic respiratory failure on home O2 due to pHTN with right sided heart failure- pulmonary following- continue Remodulin, Sildenafil, diuretics  Chronic HAs/dizziness- patient reports Dolores Yoon was in process of sending her for CT head- will order  Asthma- continue Pulmicort, Singulair, Duonebs PRN, Flonase  DVT history- continue Xarelto

## 2025-04-06 NOTE — PROGRESS NOTE ADULT - ASSESSMENT
43 y/o female, PMH pHTN (on treprostinil infusion), HF (s/p PPM dual chamber 2016), chronic PE (dx 2017) on DOAC, SVT (s/p ablation 05/2020), asthma, h/o MRSA abdominal wall abscesses last 2/'25, and JULIA. was admitted to [facility] 10/13/24-10/29/24 for pre-transplant expedited workup. Sent to ED 4/2/25 from MD for abdominal abscess    CXR (4/2) Perihilar opacities, likely represents enlarged pulmonary arteries. Otherwise clear lungs.    CT A/P (4/2) Peripherally enhancing lesion in the right mid abdominal wall subjacent to diffuse skin thickening measuring 5.0 x 2.1 x 4.0 cm consistent with phlegmon/abscess. Underlying mass is not excluded. Superficial skin thickening in the left abdominal wall at the level of   the umbilicus. Underlying small abscess measures 8 mm.    s/p bedside abdominal wall abscess I&D on 4/3    #MRSA Abdominal Wall Abscess  -- 4/3 s/p abdominal wall abscess I&D- cx with MRSA . doxy/linezolid susceptible  --Continue Linezolid 600 BID but po. 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   --Continue to follow CBC with diff  --Continue to follow temperature curve  --Follow up on preliminary blood cultures  -- anticipate 10-14 day course  -- once healed, will attempt hibiclens decolonization, instructions given to patient. please prescribe prior to discharge    #Pre-Lung Transplant Evaluation  COVID19 Nucleocapsid Antibody Negative  COVID19 Rajinder Antibody Positive  HAV IgG Negative  HBVs Ab Negative  HBVsAg Negative  HBVc Ab Negative  HCV Ab Negative  HSV 1 IgG Positive  HSV 2 IgG Negative  EBV IgG Positive  CMV IgG Positive  VZV IgG Positive  Measles IgG Positive  Mumps IgG Positive  Rubella IgG Positive  Quantiferon Gold Negative  HIV Ag/Ab by [facility]IA Negative  Syphilis Screen Negative  Toxoplasma IgG Negative  Strongyloides Ab Negative  Trypanosoma cruzi Ab Negative  --ID clearance for lung transplant pending treatment of abdominal wall abscess    #Encounter to Vaccinate Patient  COVID19: Would benefit from COVID19 [id number] Vaccine Dose  Influenza: needs this , can be given at pulmonology office, deferring today not to give 2 reactogenic vaccines  RSV: Arexvy 3/6/25  Pneumococcal: States she had pneumococcal vaccination ~2022  HAV: Would benefit from Havrix  HBV: Heplisav Dose 1 3/6/25. Dose 2 of Heplisav prior to discharge  MMR: Rubella IgG pending. Mumps and Measles immune  Varicella: Immune will not require further vaccination  Shingles: Will require Shingrix  Tdap: Will require Tdap      Thank you for involving us in the care of this patient  Transplant ID will continue to follow  Please call or page with additional questions  Pager; #[phone]  Teams: from 8 am to 5 pm  Margo Ash MD

## 2025-04-06 NOTE — PROGRESS NOTE ADULT - PROBLEM SELECTOR PLAN 1
Sent in by outside pulmonary doctor for concern that abscess was not resolving   Patient has Remodulin pump in the area of the abscess   Started two weeks ago and was started on doxycycline   Denies fevers, chills, vomiting, myalgias during this time period   Had chronic diarrhea and occasional vomiting as a known side effects of some of her medications   Not currently meeting SIRS criteria or septic   Patient is allergic to vancomycin   - CT abdomen pelvis Peripherally enhancing lesion in the right mid abdominal wall subjacent to diffuse skin thickening measuring 5.0 x 2.1 x 4.0 cm consistent with phlegmon/abscess  Superficial skin thickening in the left abdominal wall at the level of the umbilicus. Underlying small abscess measures 8 mm.    PLAN  - Linezolid 600 Q12 hours for now   - If patient begins to decompensate, can switch to Daptomycin 6mg/kg  - Blood cultures x2 - NGTD  - F/u wound cultures done 4/4  - s/p I&D - ID following Sent in by outside pulmonary doctor for concern that abscess was not resolving   Patient has Remodulin pump in the area of the abscess   Started two weeks ago and was started on doxycycline   Denies fevers, chills, vomiting, myalgias during this time period   Had chronic diarrhea and occasional vomiting as a known side effects of some of her medications   Not currently meeting SIRS criteria or septic   Patient is allergic to vancomycin   - CT abdomen pelvis Peripherally enhancing lesion in the right mid abdominal wall subjacent to diffuse skin thickening measuring 5.0 x 2.1 x 4.0 cm consistent with phlegmon/abscess  Superficial skin thickening in the left abdominal wall at the level of the umbilicus. Underlying small abscess measures 8 mm.  MRSA on wound culture     PLAN  - Linezolid 600 Q12 hours for now   - can continue linezolid 600 q12 oral for total 10 to 14 days   - If patient begins to decompensate, can switch to Daptomycin 6mg/kg  - Blood cultures x2 - NGTD  - s/p I&D - ID following

## 2025-04-06 NOTE — PROGRESS NOTE ADULT - SUBJECTIVE AND OBJECTIVE BOX
PROGRESS NOTE:   Authored by Dr. Rivera Carter MD     Patient is a 44y old  Female who presents with a chief complaint of MRSA Abdominal Wall Abscess (03 Apr 2025 11:06)      SUBJECTIVE / OVERNIGHT EVENTS:  No acute events overnight.     MEDICATIONS  (STANDING):  buDESOnide    Inhalation Suspension 0.5 milliGRAM(s) Inhalation daily  buMETAnide 1 milliGRAM(s) Oral daily  chlorhexidine 2% Cloths 1 Application(s) Topical daily  fluticasone propionate 50 MICROgram(s)/spray Nasal Spray 1 Spray(s) Both Nostrils two times a day  influenza   Vaccine 0.5 milliLiter(s) IntraMuscular once  lidocaine 1%/epinephrine 1:100,000 Inj 20 milliLiter(s) Local Injection once  linezolid  IVPB 600 milliGRAM(s) IV Intermittent every 12 hours  montelukast 10 milliGRAM(s) Oral daily  pantoprazole    Tablet 40 milliGRAM(s) Oral before breakfast  remodulin (treprostinil) SubQ infusion 1 Dose(s) 1 Dose(s) SubCutaneous Continuous Pump  rivaroxaban 15 milliGRAM(s) Oral with dinner  sildenafil (REVATIO) 20 milliGRAM(s) Oral every 8 hours  spironolactone 50 milliGRAM(s) Oral two times a day    MEDICATIONS  (PRN):  acetaminophen     Tablet .. 1000 milliGRAM(s) Oral every 6 hours PRN Mild Pain (1 - 3), Moderate Pain (4 - 6)  albuterol    90 MICROgram(s) HFA Inhaler 2 Puff(s) Inhalation every 6 hours PRN Shortness of Breath  albuterol/ipratropium for Nebulization 3 milliLiter(s) Nebulizer every 6 hours PRN Shortness of Breath  HYDROmorphone  Injectable 0.5 milliGRAM(s) IV Push every 4 hours PRN Severe Pain (7 - 10)      CAPILLARY BLOOD GLUCOSE        I&O's Summary    05 Apr 2025 07:01  -  06 Apr 2025 07:00  --------------------------------------------------------  IN: 358 mL / OUT: 1250 mL / NET: -892 mL        PHYSICAL EXAM:  Vital Signs Last 24 Hrs  T(C): 36.7 (06 Apr 2025 05:15), Max: 36.8 (05 Apr 2025 11:27)  T(F): 98 (06 Apr 2025 05:15), Max: 98.2 (05 Apr 2025 11:27)  HR: 89 (06 Apr 2025 05:15) (72 - 89)  BP: 103/70 (06 Apr 2025 05:15) (90/61 - 103/70)  BP(mean): --  RR: 18 (06 Apr 2025 05:15) (18 - 18)  SpO2: 99% (06 Apr 2025 05:15) (96% - 100%)    Parameters below as of 06 Apr 2025 05:15  Patient On (Oxygen Delivery Method): nasal cannula        CONSTITUTIONAL: NAD  HEET: MMM, EOMI, PERRLA  NECK: supple  RESPIRATORY: Normal respiratory effort; lungs are clear to auscultation bilaterally  CARDIOVASCULAR: Regular rate and rhythm, normal S1 and S2, no murmur/rub/gallop; No lower extremity edema; Peripheral pulses are 2+ bilaterally  ABDOMEN: Nontender to palpation, normoactive bowel sounds, no rebound/guarding; No hepatosplenomegaly  MUSCULOSKELETAL: no clubbing or cyanosis of digits; no joint swelling or tenderness to palpation  PSYCH: A+O to person, place, and time; affect appropriate  SKIN: No rash    LABS:                        11.9   6.11  )-----------( 363      ( 05 Apr 2025 10:20 )             38.2     04-05    136  |  101  |  6[L]  ----------------------------<  75  3.6   |  23  |  0.91    Ca    8.1[L]      05 Apr 2025 10:20  Phos  3.1     04-05            Urinalysis Basic - ( 05 Apr 2025 10:20 )    Color: x / Appearance: x / SG: x / pH: x  Gluc: 75 mg/dL / Ketone: x  / Bili: x / Urobili: x   Blood: x / Protein: x / Nitrite: x   Leuk Esterase: x / RBC: x / WBC x   Sq Epi: x / Non Sq Epi: x / Bacteria: x        Culture - Abscess with Gram Stain (collected 03 Apr 2025 14:15)  Source: Abscess abdominal wall  Gram Stain (04 Apr 2025 22:48):    Moderate polymorphonuclear leukocytes per low power field    No organisms seen per oil power field  Preliminary Report (05 Apr 2025 19:41):    Moderate Methicillin Resistant Staphylococcus aureus  Organism: Methicillin resistant Staphylococcus aureus (05 Apr 2025 19:41)  Organism: Methicillin resistant Staphylococcus aureus (05 Apr 2025 19:41)        Tele Reviewed:    RADIOLOGY & ADDITIONAL TESTS:  Results Reviewed:   Imaging Personally Reviewed:  Electrocardiogram Personally Reviewed:     PROGRESS NOTE:   Authored by Dr. Rivera Carter MD     Patient is a 44y old  Female who presents with a chief complaint of MRSA Abdominal Wall Abscess (03 Apr 2025 11:06)      SUBJECTIVE / OVERNIGHT EVENTS:  No acute events overnight.   Seen at bedside still feeling dizzy and has a headache, otherwise is feeling okay. Onboard with plan for inpatient [facility]     MEDICATIONS  (STANDING):  buDESOnide    Inhalation Suspension 0.5 milliGRAM(s) Inhalation daily  buMETAnide 1 milliGRAM(s) Oral daily  chlorhexidine 2% Cloths 1 Application(s) Topical daily  fluticasone propionate 50 MICROgram(s)/spray Nasal Spray 1 Spray(s) Both Nostrils two times a day  influenza   Vaccine 0.5 milliLiter(s) IntraMuscular once  lidocaine 1%/epinephrine 1:100,000 Inj 20 milliLiter(s) Local Injection once  linezolid  IVPB 600 milliGRAM(s) IV Intermittent every 12 hours  montelukast 10 milliGRAM(s) Oral daily  pantoprazole    Tablet 40 milliGRAM(s) Oral before breakfast  remodulin (treprostinil) SubQ infusion 1 Dose(s) 1 Dose(s) SubCutaneous Continuous Pump  rivaroxaban 15 milliGRAM(s) Oral with dinner  sildenafil (REVATIO) 20 milliGRAM(s) Oral every 8 hours  spironolactone 50 milliGRAM(s) Oral two times a day    MEDICATIONS  (PRN):  acetaminophen     Tablet .. 1000 milliGRAM(s) Oral every 6 hours PRN Mild Pain (1 - 3), Moderate Pain (4 - 6)  albuterol    90 MICROgram(s) HFA Inhaler 2 Puff(s) Inhalation every 6 hours PRN Shortness of Breath  albuterol/ipratropium for Nebulization 3 milliLiter(s) Nebulizer every 6 hours PRN Shortness of Breath  HYDROmorphone  Injectable 0.5 milliGRAM(s) IV Push every 4 hours PRN Severe Pain (7 - 10)      CAPILLARY BLOOD GLUCOSE        I&O's Summary    05 Apr 2025 07:01  -  06 Apr 2025 07:00  --------------------------------------------------------  IN: 358 mL / OUT: 1250 mL / NET: -892 mL        PHYSICAL EXAM:  Vital Signs Last 24 Hrs  T(C): 36.7 (06 Apr 2025 05:15), Max: 36.8 (05 Apr 2025 11:27)  T(F): 98 (06 Apr 2025 05:15), Max: 98.2 (05 Apr 2025 11:27)  HR: 89 (06 Apr 2025 05:15) (72 - 89)  BP: 103/70 (06 Apr 2025 05:15) (90/61 - 103/70)  BP(mean): --  RR: 18 (06 Apr 2025 05:15) (18 - 18)  SpO2: 99% (06 Apr 2025 05:15) (96% - 100%)    Parameters below as of 06 Apr 2025 05:15  Patient On (Oxygen Delivery Method): nasal cannula        CONSTITUTIONAL: NAD  HEET: MMM, EOMI, PERRLA  NECK: supple  RESPIRATORY: Normal respiratory effort; lungs are clear to auscultation bilaterally  CARDIOVASCULAR: Regular rate and rhythm, normal S1 and S2, no murmur/rub/gallop; No lower extremity edema; Peripheral pulses are 2+ bilaterally  ABDOMEN: Nontender to palpation, normoactive bowel sounds, no rebound/guarding; No hepatosplenomegaly  MUSCULOSKELETAL: no clubbing or cyanosis of digits; no joint swelling or tenderness to palpation  PSYCH: A+O to person, place, and time; affect appropriate  SKIN: No rash    LABS:                        11.9   6.11  )-----------( 363      ( 05 Apr 2025 10:20 )             38.2     04-05    136  |  101  |  6[L]  ----------------------------<  75  3.6   |  23  |  0.91    Ca    8.1[L]      05 Apr 2025 10:20  Phos  3.1     04-05            Urinalysis Basic - ( 05 Apr 2025 10:20 )    Color: x / Appearance: x / SG: x / pH: x  Gluc: 75 mg/dL / Ketone: x  / Bili: x / Urobili: x   Blood: x / Protein: x / Nitrite: x   Leuk Esterase: x / RBC: x / WBC x   Sq Epi: x / Non Sq Epi: x / Bacteria: x        Culture - Abscess with Gram Stain (collected 03 Apr 2025 14:15)  Source: Abscess abdominal wall  Gram Stain (04 Apr 2025 22:48):    Moderate polymorphonuclear leukocytes per low power field    No organisms seen per oil power field  Preliminary Report (05 Apr 2025 19:41):    Moderate Methicillin Resistant Staphylococcus aureus  Organism: Methicillin resistant Staphylococcus aureus (05 Apr 2025 19:41)  Organism: Methicillin resistant Staphylococcus aureus (05 Apr 2025 19:41)        Tele Reviewed:    RADIOLOGY & ADDITIONAL TESTS:  Results Reviewed:   Imaging Personally Reviewed:  Electrocardiogram Personally Reviewed:

## 2025-04-06 NOTE — PROGRESS NOTE ADULT - SUBJECTIVE AND OBJECTIVE BOX
Follow Up:  intraabdominal abscess    Interval History: s/p I&D of intraabdominal abscess on 4/3. afebrile.   reporting nausea in am when linezolid is given iv without food    Vital Signs Last 24 Hrs  T(C): 36.9 (06 Apr 2025 10:50), Max: 36.9 (06 Apr 2025 10:50)  T(F): 98.5 (06 Apr 2025 10:50), Max: 98.5 (06 Apr 2025 10:50)  HR: 91 (06 Apr 2025 17:13) (80 - 91)  BP: 92/67 (06 Apr 2025 17:13) (90/61 - 103/70)  BP(mean): --  RR: 18 (06 Apr 2025 10:50) (18 - 18)  SpO2: 99% (06 Apr 2025 10:50) (96% - 100%)    Parameters below as of 06 Apr 2025 05:15  Patient On (Oxygen Delivery Method): nasal cannula        PHYSICAL EXAMINATION:  General: Alert and Awake, NAD  HEENT: Normocephalic / Atraumatic  Resp: No accessory muscles of respiration utilized  Abdomen: +left sided infusion pump (no induration around that site). Dressing over right sided abdominal wall abscess , NBS, No HSM, No rigidity or guarding  MSK: No LE edema.   : No lucas  Skin: No rashes or lesions.    Neuro: Alert and Awake. CN 2-12 Grossly intact. Moves all four extremities spontaneously.  Psych: Calm, Pleasant, Cooperative                 ____________________________________________________  ROS  GENERAL: denies chills, , night sweats, weight loss.   PSYCH: denies depression, anxiety, suicidal ideation, hallucination, and delusions  SKIN: no rash or lesions; no color changes, no abnormal nevi,no  dryness, and nojaundice    EYES: denies visual changes, floaters, pain, inflammation, blurred vision, and discharge  ENT: denies tinnitus, vertigo, epistaxis, oral lesion, and decreased acuity  PULM: denies, hemoptysis, pleurisy  CVS: denies angina, palpitations,+ orthopnea, no syncope, or heart murmur  GI: denies constipation, diarrhea, melena, abdominal pain, nausea.   : denies dysuria, frequency, discharge, incontinence, stones or macroscopic hematuria  MS: no arthralgias, no erythema or swelling, no myalgias, noedema, or lower back pain.   CNS: denies numbness, dizziness, seizure, or tremor  ENDO: denies heat/cold intolerance, polyuria, polydipsia, malaise.    HEME: denies bruising, bleeding, lymphadenopathy, anemia, and calf pain    Allergies  vancomycin (Rash)  penicillin (Rash)  adhesives (Rash)    __________________________________________________  MEDS:  MEDICATIONS  (STANDING):  acetaminophen     Tablet .. 1000 every 6 hours PRN  albuterol    90 MICROgram(s) HFA Inhaler 2 every 6 hours PRN  albuterol/ipratropium for Nebulization 3 every 6 hours PRN  buDESOnide    Inhalation Suspension 0.5 daily  buMETAnide 1 daily  HYDROmorphone  Injectable 0.5 every 4 hours PRN  influenza   Vaccine 0.5 once  melatonin 5 at bedtime PRN  montelukast 10 daily  pantoprazole    Tablet 40 before breakfast  rivaroxaban 15 with dinner  sildenafil (REVATIO) 20 every 8 hours  spironolactone 50 two times a day    _________________________________________________  ANTIMICOBIALS  influenza   Vaccine 0.5 once  linezolid    Tablet 600 every 12 hours      GENERAL LABS              11.4                 135  | 21   | 7            6.45  >-----------< 360     ------------------------< 89                    37.4                 4.3  | 104  | 0.95                                         Ca 8.3   Mg x     Ph 2.7        Urinalysis Basic - ( 06 Apr 2025 09:25 )    Color: x / Appearance: x / SG: x / pH: x  Gluc: 89 mg/dL / Ketone: x  / Bili: x / Urobili: x   Blood: x / Protein: x / Nitrite: x   Leuk Esterase: x / RBC: x / WBC x   Sq Epi: x / Non Sq Epi: x / Bacteria: x        _________________________________________________  MICROBIOLOGY  -----------    Culture - Abscess with Gram Stain (collected 03 Apr 2025 14:15)  Source: Abscess abdominal wall  Gram Stain (04 Apr 2025 22:48):    Moderate polymorphonuclear leukocytes per low power field    No organisms seen per oil power field  Preliminary Report (05 Apr 2025 19:41):    Moderate Methicillin Resistant Staphylococcus aureus  Organism: Methicillin resistant Staphylococcus aureus (05 Apr 2025 19:41)  Organism: Methicillin resistant Staphylococcus aureus (05 Apr 2025 19:41)      Method Type: BRIAN      -  Clindamycin: R >4      -  Daptomycin: S <=0.5      -  Erythromycin: R >4      -  Gentamicin: S <=4 Should not be used as monotherapy      -  Linezolid: S <=1      -  Oxacillin: R >2      -  Penicillin: R >2      -  Rifampin: S <=1 Should not be used as monotherapy      -  Tetracycline: S <=4      -  Trimethoprim/Sulfamethoxazole: S <=0.5/9.5      -  Vancomycin: S 1    Urinalysis with Rflx Culture (collected 02 Apr 2025 15:13)    Culture - Blood (collected 02 Apr 2025 12:30)  Source: Blood Blood-Peripheral  Preliminary Report (06 Apr 2025 18:01):    No growth at 4 days    Culture - Blood (collected 02 Apr 2025 12:15)  Source: Blood Blood-Peripheral  Preliminary Report (06 Apr 2025 18:01):    No growth at 4 days                    Fungitell:   _______________________________________________  PERTINENT IMAGING

## 2025-04-06 NOTE — PROGRESS NOTE ADULT - ASSESSMENT
44y F, with PMH of pHTN (on treprostinil infusion still continuing ), Right  Heart failure(s/p PPM dual chamber ), chronic PE (dx ) on DOAC, SVT (s/p ablation 2020), asthma, h/o MRSA abdominal wall abscesses last , and JULIA presenting for treatment of new abdominal abscess s/p I&D drainage on abx treatment. pending wound cultures and home abx course  44y F, with PMH of pHTN (on treprostinil infusion still continuing ), Right  Heart failure(s/p PPM dual chamber ), chronic PE (dx ) on DOAC, SVT (s/p ablation 2020), asthma, h/o MRSA abdominal wall abscesses last , and JULIA presenting for treatment of new abdominal abscess s/p I&D drainage on abx treatment wound culture MRSA and pending dispo with oral linezolid

## 2025-04-07 LAB
ANION GAP SERPL CALC-SCNC: 14 MMOL/L — SIGNIFICANT CHANGE UP (ref 5–17)
BUN SERPL-MCNC: 11 MG/DL — SIGNIFICANT CHANGE UP (ref 7–23)
CALCIUM SERPL-MCNC: 9 MG/DL — SIGNIFICANT CHANGE UP (ref 8.4–10.5)
CHLORIDE SERPL-SCNC: 100 MMOL/L — SIGNIFICANT CHANGE UP (ref 96–108)
CO2 SERPL-SCNC: 23 MMOL/L — SIGNIFICANT CHANGE UP (ref 22–31)
CREAT SERPL-MCNC: 1.08 MG/DL — SIGNIFICANT CHANGE UP (ref 0.5–1.3)
CULTURE RESULTS: SIGNIFICANT CHANGE UP
CULTURE RESULTS: SIGNIFICANT CHANGE UP
DNA PLOIDY SPEC FC-IMP: SIGNIFICANT CHANGE UP
EGFR: 65 ML/MIN/1.73M2 — SIGNIFICANT CHANGE UP
EGFR: 65 ML/MIN/1.73M2 — SIGNIFICANT CHANGE UP
GLUCOSE SERPL-MCNC: 79 MG/DL — SIGNIFICANT CHANGE UP (ref 70–99)
HCT VFR BLD CALC: 40.7 % — SIGNIFICANT CHANGE UP (ref 34.5–45)
HGB BLD-MCNC: 12.5 G/DL — SIGNIFICANT CHANGE UP (ref 11.5–15.5)
MAGNESIUM SERPL-MCNC: 1.9 MG/DL — SIGNIFICANT CHANGE UP (ref 1.6–2.6)
MCHC RBC-ENTMCNC: 21.8 PG — LOW (ref 27–34)
MCHC RBC-ENTMCNC: 30.7 G/DL — LOW (ref 32–36)
MCV RBC AUTO: 71 FL — LOW (ref 80–100)
NRBC BLD AUTO-RTO: 0 /100 WBCS — SIGNIFICANT CHANGE UP (ref 0–0)
PHOSPHATE SERPL-MCNC: 3.3 MG/DL — SIGNIFICANT CHANGE UP (ref 2.5–4.5)
PLATELET # BLD AUTO: 440 K/UL — HIGH (ref 150–400)
POTASSIUM SERPL-MCNC: 4.4 MMOL/L — SIGNIFICANT CHANGE UP (ref 3.5–5.3)
POTASSIUM SERPL-SCNC: 4.4 MMOL/L — SIGNIFICANT CHANGE UP (ref 3.5–5.3)
RBC # BLD: 5.73 M/UL — HIGH (ref 3.8–5.2)
RBC # FLD: 16.8 % — HIGH (ref 10.3–14.5)
SODIUM SERPL-SCNC: 137 MMOL/L — SIGNIFICANT CHANGE UP (ref 135–145)
SPECIMEN SOURCE: SIGNIFICANT CHANGE UP
SPECIMEN SOURCE: SIGNIFICANT CHANGE UP
WBC # BLD: 9.39 K/UL — SIGNIFICANT CHANGE UP (ref 3.8–10.5)
WBC # FLD AUTO: 9.39 K/UL — SIGNIFICANT CHANGE UP (ref 3.8–10.5)

## 2025-04-07 PROCEDURE — 99232 SBSQ HOSP IP/OBS MODERATE 35: CPT

## 2025-04-07 PROCEDURE — 99232 SBSQ HOSP IP/OBS MODERATE 35: CPT | Mod: GC

## 2025-04-07 PROCEDURE — G0452: CPT | Mod: 26

## 2025-04-07 PROCEDURE — 70450 CT HEAD/BRAIN W/O DYE: CPT | Mod: 26

## 2025-04-07 PROCEDURE — 99233 SBSQ HOSP IP/OBS HIGH 50: CPT | Mod: GC

## 2025-04-07 RX ORDER — OXYCODONE HYDROCHLORIDE 30 MG/1
7.5 TABLET ORAL EVERY 6 HOURS
Refills: 0 | Status: DISCONTINUED | OUTPATIENT
Start: 2025-04-07 | End: 2025-04-07

## 2025-04-07 RX ORDER — OXYCODONE HYDROCHLORIDE 30 MG/1
2.5 TABLET ORAL EVERY 6 HOURS
Refills: 0 | Status: DISCONTINUED | OUTPATIENT
Start: 2025-04-07 | End: 2025-04-07

## 2025-04-07 RX ORDER — SENNA 187 MG
2 TABLET ORAL AT BEDTIME
Refills: 0 | Status: DISCONTINUED | OUTPATIENT
Start: 2025-04-07 | End: 2025-04-18

## 2025-04-07 RX ORDER — POLYETHYLENE GLYCOL 3350 17 G/17G
17 POWDER, FOR SOLUTION ORAL DAILY
Refills: 0 | Status: DISCONTINUED | OUTPATIENT
Start: 2025-04-07 | End: 2025-04-07

## 2025-04-07 RX ORDER — BISACODYL 5 MG
5 TABLET, DELAYED RELEASE (ENTERIC COATED) ORAL AT BEDTIME
Refills: 0 | Status: DISCONTINUED | OUTPATIENT
Start: 2025-04-07 | End: 2025-04-08

## 2025-04-07 RX ORDER — OXYCODONE HYDROCHLORIDE 30 MG/1
2.5 TABLET ORAL EVERY 6 HOURS
Refills: 0 | Status: DISCONTINUED | OUTPATIENT
Start: 2025-04-07 | End: 2025-04-09

## 2025-04-07 RX ORDER — POLYETHYLENE GLYCOL 3350 17 G/17G
17 POWDER, FOR SOLUTION ORAL
Refills: 0 | Status: DISCONTINUED | OUTPATIENT
Start: 2025-04-07 | End: 2025-04-18

## 2025-04-07 RX ORDER — OXYCODONE HYDROCHLORIDE 30 MG/1
2.5 TABLET ORAL ONCE
Refills: 0 | Status: DISCONTINUED | OUTPATIENT
Start: 2025-04-07 | End: 2025-04-07

## 2025-04-07 RX ADMIN — Medication 50 MILLIGRAM(S): at 06:45

## 2025-04-07 RX ADMIN — Medication 0.5 MILLIGRAM(S): at 06:52

## 2025-04-07 RX ADMIN — LIDOCAINE HYDROCHLORIDE 1 PATCH: 20 JELLY TOPICAL at 05:06

## 2025-04-07 RX ADMIN — POLYETHYLENE GLYCOL 3350 17 GRAM(S): 17 POWDER, FOR SOLUTION ORAL at 11:29

## 2025-04-07 RX ADMIN — SILDENAFIL 20 MILLIGRAM(S): 50 TABLET, FILM COATED ORAL at 08:47

## 2025-04-07 RX ADMIN — POLYETHYLENE GLYCOL 3350 17 GRAM(S): 17 POWDER, FOR SOLUTION ORAL at 14:23

## 2025-04-07 RX ADMIN — Medication 0.5 MILLIGRAM(S): at 11:55

## 2025-04-07 RX ADMIN — Medication 40 MILLIGRAM(S): at 06:45

## 2025-04-07 RX ADMIN — OXYCODONE HYDROCHLORIDE 2.5 MILLIGRAM(S): 30 TABLET ORAL at 19:02

## 2025-04-07 RX ADMIN — OXYCODONE HYDROCHLORIDE 2.5 MILLIGRAM(S): 30 TABLET ORAL at 14:25

## 2025-04-07 RX ADMIN — RIVAROXABAN 15 MILLIGRAM(S): 10 TABLET, FILM COATED ORAL at 18:28

## 2025-04-07 RX ADMIN — Medication 0.5 MILLIGRAM(S): at 06:49

## 2025-04-07 RX ADMIN — Medication 5 MILLIGRAM(S): at 20:47

## 2025-04-07 RX ADMIN — MONTELUKAST SODIUM 10 MILLIGRAM(S): 10 TABLET ORAL at 11:30

## 2025-04-07 RX ADMIN — OXYCODONE HYDROCHLORIDE 2.5 MILLIGRAM(S): 30 TABLET ORAL at 18:28

## 2025-04-07 RX ADMIN — LIDOCAINE HYDROCHLORIDE 1 PATCH: 20 JELLY TOPICAL at 19:29

## 2025-04-07 RX ADMIN — IPRATROPIUM BROMIDE AND ALBUTEROL SULFATE 3 MILLILITER(S): .5; 2.5 SOLUTION RESPIRATORY (INHALATION) at 00:33

## 2025-04-07 RX ADMIN — FLUTICASONE PROPIONATE 1 SPRAY(S): 50 SPRAY, METERED NASAL at 18:29

## 2025-04-07 RX ADMIN — OXYCODONE HYDROCHLORIDE 2.5 MILLIGRAM(S): 30 TABLET ORAL at 15:25

## 2025-04-07 RX ADMIN — LINEZOLID 600 MILLIGRAM(S): 2 INJECTION, SOLUTION INTRAVENOUS at 18:28

## 2025-04-07 RX ADMIN — LIDOCAINE HYDROCHLORIDE 1 PATCH: 20 JELLY TOPICAL at 11:31

## 2025-04-07 RX ADMIN — Medication 1 APPLICATION(S): at 11:30

## 2025-04-07 RX ADMIN — Medication 0.5 MILLIGRAM(S): at 11:32

## 2025-04-07 RX ADMIN — Medication 5 MILLIGRAM(S): at 00:49

## 2025-04-07 RX ADMIN — SILDENAFIL 20 MILLIGRAM(S): 50 TABLET, FILM COATED ORAL at 18:27

## 2025-04-07 RX ADMIN — Medication 2 TABLET(S): at 12:50

## 2025-04-07 RX ADMIN — LINEZOLID 600 MILLIGRAM(S): 2 INJECTION, SOLUTION INTRAVENOUS at 06:47

## 2025-04-07 NOTE — PROGRESS NOTE ADULT - PROBLEM SELECTOR PLAN 1
Sent in by outside pulmonary doctor for concern that abscess was not resolving   Patient has Remodulin pump in the area of the abscess   Started two weeks ago and was started on doxycycline   Denies fevers, chills, vomiting, myalgias during this time period   Had chronic diarrhea and occasional vomiting as a known side effects of some of her medications   Not currently meeting SIRS criteria or septic   Patient is allergic to vancomycin   - CT abdomen pelvis Peripherally enhancing lesion in the right mid abdominal wall subjacent to diffuse skin thickening measuring 5.0 x 2.1 x 4.0 cm consistent with phlegmon/abscess  Superficial skin thickening in the left abdominal wall at the level of the umbilicus. Underlying small abscess measures 8 mm.  MRSA on wound culture     PLAN  - Linezolid 600 Q12 hours for now   - can continue linezolid 600 q12 oral for total 10 to 14 days   - If patient begins to decompensate, can switch to Daptomycin 6mg/kg  - Blood cultures x2 - NGTD  - s/p I&D - ID following Sent in by outside pulmonary doctor for concern that abscess was not resolving   Patient has Remodulin pump in the area of the abscess   Started two weeks ago and was started on doxycycline   Denies fevers, chills, vomiting, myalgias during this time period   Had chronic diarrhea and occasional vomiting as a known side effects of some of her medications   Not currently meeting SIRS criteria or septic   Patient is allergic to vancomycin   - CT abdomen pelvis Peripherally enhancing lesion in the right mid abdominal wall subjacent to diffuse skin thickening measuring 5.0 x 2.1 x 4.0 cm consistent with phlegmon/abscess  Superficial skin thickening in the left abdominal wall at the level of the umbilicus. Underlying small abscess measures 8 mm.  MRSA on wound culture     PLAN  - Linezolid 600 Q12 hours for now   - can continue linezolid 600 q12 oral for total 10 to 14 days - pending PA  - If patient begins to decompensate, can switch to Daptomycin 6mg/kg  - Blood cultures x2 - NGTD  - s/p I&D - ID following

## 2025-04-07 NOTE — PROGRESS NOTE ADULT - ATTENDING COMMENTS
44F w/ PMH of pHTN (on Remodulin sq and sildenafil), UE DVT on Xarelto, Asthma and CHF p/w abd wall abscess s/p drainage.     Pt with mild hypotension and orthostatic sx which is new.  Can hold diuresis today and monitor sx  cont sildenafil and remodulin  repeat studies as above  cont NC to goal sat approx 95%  cont ICS and BD tx (on fasenra as outpt)  d/w Phtn team  outpt f/u with [facility]

## 2025-04-07 NOTE — PROVIDER CONTACT NOTE (OTHER) - ACTION/TREATMENT ORDERED:
Provider aware & at bedside. As per provider, will order halved dose of sildenafil to be given. Provider aware & at bedside. As per provider, recheck BP in 1 hour.

## 2025-04-07 NOTE — PROGRESS NOTE ADULT - SUBJECTIVE AND OBJECTIVE BOX
Follow Up:  intraabdominal abscess    Interval History: pain at the prior abscess site is improving. afebrile. no other acute events.     REVIEW OF SYSTEMS  [  ] ROS unobtainable because:    [ x ] All other systems negative except as noted below    Constitutional:  [ ] fever [ ] chills  [ ] weight loss  [ ] weakness  Skin:  [ x] rash [ ] phlebitis	  Eyes: [ ] icterus [ ] pain  [ ] discharge	  ENMT: [ ] sore throat  [ ] thrush [ ] ulcers [ ] exudates  Respiratory: [ ] dyspnea [ ] hemoptysis [ ] cough [ ] sputum	  Cardiovascular:  [ ] chest pain [ ] palpitations [ ] edema	  Gastrointestinal:  [ ] nausea [ ] vomiting [ ] diarrhea [ ] constipation [ ] pain	  Genitourinary:  [ ] dysuria [ ] frequency [ ] hematuria [ ] discharge [ ] flank pain  [ ] incontinence  Musculoskeletal:  [ ] myalgias [ ] arthralgias [ ] arthritis  [ ] back pain  Neurological:  [ ] headache [ ] seizures  [ ] confusion/altered mental status    Allergies  vancomycin (Rash)  penicillin (Rash)  adhesives (Rash)        ANTIMICROBIALS:  linezolid    Tablet 600 every 12 hours      OTHER MEDS:  MEDICATIONS  (STANDING):  acetaminophen     Tablet .. 1000 every 6 hours PRN  albuterol    90 MICROgram(s) HFA Inhaler 2 every 6 hours PRN  albuterol/ipratropium for Nebulization 3 every 6 hours PRN  buDESOnide    Inhalation Suspension 0.5 daily  influenza   Vaccine 0.5 once  melatonin 5 at bedtime PRN  montelukast 10 daily  oxyCODONE    IR 2.5 every 6 hours PRN  pantoprazole    Tablet 40 before breakfast  polyethylene glycol 3350 17 two times a day  rivaroxaban 15 with dinner  senna 2 at bedtime  sildenafil (REVATIO) 20 every 8 hours  spironolactone 50 two times a day      Vital Signs Last 24 Hrs  T(C): 36.6 (07 Apr 2025 11:24), Max: 37.2 (07 Apr 2025 00:13)  T(F): 97.8 (07 Apr 2025 11:24), Max: 99 (07 Apr 2025 00:13)  HR: 97 (07 Apr 2025 11:24) (84 - 100)  BP: 106/69 (07 Apr 2025 11:24) (89/61 - 115/74)  BP(mean): --  RR: 18 (07 Apr 2025 11:24) (18 - 18)  SpO2: 96% (07 Apr 2025 11:24) (96% - 98%)    Parameters below as of 07 Apr 2025 11:24  Patient On (Oxygen Delivery Method): nasal cannula  O2 Flow (L/min): 2      PHYSICAL EXAMINATION:  General: Alert and Awake, NAD  HEENT: Normocephalic / Atraumatic  Resp: No accessory muscles of respiration utilized  Abdomen: +left sided infusion pump (no induration around that site). Dressing over right sided abdominal wall abscess , NBS, No HSM, No rigidity or guarding  MSK: No LE edema.   : No lucas  Skin: No rashes or lesions.    Neuro: Alert and Awake. CN 2-12 Grossly intact. Moves all four extremities spontaneously.  Psych: Calm, Pleasant, Cooperative                          12.5   9.39  )-----------( 440      ( 07 Apr 2025 06:17 )             40.7       04-07    137  |  100  |  11  ----------------------------<  79  4.4   |  23  |  1.08    Ca    9.0      07 Apr 2025 06:18  Phos  3.3     04-07  Mg     1.9     04-07        Urinalysis Basic - ( 07 Apr 2025 06:18 )    Color: x / Appearance: x / SG: x / pH: x  Gluc: 79 mg/dL / Ketone: x  / Bili: x / Urobili: x   Blood: x / Protein: x / Nitrite: x   Leuk Esterase: x / RBC: x / WBC x   Sq Epi: x / Non Sq Epi: x / Bacteria: x        MICROBIOLOGY:  v  Abscess abdominal wall  04-03-25   Moderate Methicillin Resistant Staphylococcus aureus  --  Methicillin resistant Staphylococcus aureus      Blood Blood-Peripheral  04-02-25   No growth at 4 days  --  --      Blood Blood-Peripheral  04-02-25   No growth at 4 days  --  --        Toxoplasma IgG Screen: <3.00 IU/mL (10-25-24 @ 07:14)  HIV-1 RNA Quantitative, Viral Load Log: NOT DET. lg /mL (10-22-24 @ 17:39)  CMV IgG Antibody: >10.00 U/mL (10-22-24 @ 15:11)          RADIOLOGY:    <The imaging below has been reviewed and visualized by me independently. Findings as detailed in report below> Follow Up:  intraabdominal abscess    Interval History: pain at the prior abscess site is improving. afebrile. no other acute events.     REVIEW OF SYSTEMS  [  ] ROS unobtainable because:    [ x ] All other systems negative except as noted below    Constitutional:  [ ] fever [ ] chills  [ ] weight loss  [ ] weakness  Skin:  [ x] rash [ ] phlebitis	  Eyes: [ ] icterus [ ] pain  [ ] discharge	  ENMT: [ ] sore throat  [ ] thrush [ ] ulcers [ ] exudates  Respiratory: [ ] dyspnea [ ] hemoptysis [ ] cough [ ] sputum	  Cardiovascular:  [ ] chest pain [ ] palpitations [ ] edema	  Gastrointestinal:  [ ] nausea [ ] vomiting [ ] diarrhea [ ] constipation [ ] pain	  Genitourinary:  [ ] dysuria [ ] frequency [ ] hematuria [ ] discharge [ ] flank pain  [ ] incontinence  Musculoskeletal:  [ ] myalgias [ ] arthralgias [ ] arthritis  [ ] back pain  Neurological:  [ ] headache [ ] seizures  [ ] confusion/altered mental status    Allergies  vancomycin (Rash)  penicillin (Rash)  adhesives (Rash)        ANTIMICROBIALS:  linezolid    Tablet 600 every 12 hours      OTHER MEDS:  MEDICATIONS  (STANDING):  acetaminophen     Tablet .. 1000 every 6 hours PRN  albuterol    90 MICROgram(s) HFA Inhaler 2 every 6 hours PRN  albuterol/ipratropium for Nebulization 3 every 6 hours PRN  buDESOnide    Inhalation Suspension 0.5 daily  influenza   Vaccine 0.5 once  melatonin 5 at bedtime PRN  montelukast 10 daily  oxyCODONE    IR 2.5 every 6 hours PRN  pantoprazole    Tablet 40 before breakfast  polyethylene glycol 3350 17 two times a day  rivaroxaban 15 with dinner  senna 2 at bedtime  sildenafil (REVATIO) 20 every 8 hours  spironolactone 50 two times a day      Vital Signs Last 24 Hrs  T(C): 36.6 (07 Apr 2025 11:24), Max: 37.2 (07 Apr 2025 00:13)  T(F): 97.8 (07 Apr 2025 11:24), Max: 99 (07 Apr 2025 00:13)  HR: 97 (07 Apr 2025 11:24) (84 - 100)  BP: 106/69 (07 Apr 2025 11:24) (89/61 - 115/74)  BP(mean): --  RR: 18 (07 Apr 2025 11:24) (18 - 18)  SpO2: 96% (07 Apr 2025 11:24) (96% - 98%)    Parameters below as of 07 Apr 2025 11:24  Patient On (Oxygen Delivery Method): nasal cannula  O2 Flow (L/min): 2      PHYSICAL EXAMINATION:  General: Alert and Awake, NAD  HEENT: Normocephalic / Atraumatic  Resp: No accessory muscles of respiration utilized  Abdomen: +left sided infusion pump (no induration around that site). Dressing over right sided abdominal wall abscess , NBS, No HSM, No rigidity or guarding  MSK: No LE edema.   : No lucas  Skin: No rashes or lesions.    Neuro: Alert and Awake. CN 2-12 Grossly intact. Moves all four extremities spontaneously.  Psych: Calm, Pleasant, Cooperative                          12.5   9.39  )-----------( 440      ( 07 Apr 2025 06:17 )             40.7       04-07    137  |  100  |  11  ----------------------------<  79  4.4   |  23  |  1.08    Ca    9.0      07 Apr 2025 06:18  Phos  3.3     04-07  Mg     1.9     04-07        Urinalysis Basic - ( 07 Apr 2025 06:18 )    Color: x / Appearance: x / SG: x / pH: x  Gluc: 79 mg/dL / Ketone: x  / Bili: x / Urobili: x   Blood: x / Protein: x / Nitrite: x   Leuk Esterase: x / RBC: x / WBC x   Sq Epi: x / Non Sq Epi: x / Bacteria: x        MICROBIOLOGY:  v  Abscess abdominal wall  04-03-25   Moderate Methicillin Resistant Staphylococcus aureus  --  Methicillin resistant Staphylococcus aureus      Blood Blood-Peripheral  04-02-25   No growth at 4 days  --  --      Blood Blood-Peripheral  04-02-25   No growth at 4 days  --  --        Toxoplasma IgG Screen: <3.00 IU/mL (10-25-24 @ 07:14)  HIV-1 RNA Quantitative, Viral Load Log: NOT DET. lg /mL (10-22-24 @ 17:39)  CMV IgG Antibody: >10.00 U/mL (10-22-24 @ 15:11)          RADIOLOGY:    <The imaging below has been reviewed and visualized by me independently. Findings as detailed in report below>    < from: CT Head No Cont (04.07.25 @ 10:22) >  IMPRESSION:  No acute intracranial hemorrhage, mass effect, or midline shift.    < end of copied text >

## 2025-04-07 NOTE — PROGRESS NOTE ADULT - SUBJECTIVE AND OBJECTIVE BOX
Pulmonary Consult Follow up     Interval Events:          REVIEW OF SYSTEMS:  [x] All other systems negative except per HPI   [ ] Unable to assess ROS because ________    OBJECTIVE:  ICU Vital Signs Last 24 Hrs  T(C): 36.6 (07 Apr 2025 11:24), Max: 37.2 (07 Apr 2025 00:13)  T(F): 97.8 (07 Apr 2025 11:24), Max: 99 (07 Apr 2025 00:13)  HR: 86 (07 Apr 2025 16:06) (84 - 100)  BP: 88/62 (07 Apr 2025 16:06) (88/62 - 115/74)  BP(mean): --  ABP: --  ABP(mean): --  RR: 18 (07 Apr 2025 11:24) (18 - 18)  SpO2: 96% (07 Apr 2025 11:24) (96% - 98%)    O2 Parameters below as of 07 Apr 2025 11:24  Patient On (Oxygen Delivery Method): nasal cannula  O2 Flow (L/min): 2            04-06 @ 07:01  -  04-07 @ 07:00  --------------------------------------------------------  IN: 240 mL / OUT: 400 mL / NET: -160 mL        PHYSICAL EXAM:  GENERAL: NAD, well-groomed, well-developed  HEAD:  Atraumatic, Normocephalic  EYES: EOMI, conjunctiva and sclera clear  ENMT: Moist mucous membranes  CHEST/LUNG: Clear to auscultation bilaterally; No rales, rhonchi, wheezing, or rubs  HEART: Regular rate and rhythm; No murmurs, rubs, or gallops  ABDOMEN: Nondistended  VASCULAR: No  cyanosis, or edema  SKIN: No rashes or lesions  NERVOUS SYSTEM:  Alert & Oriented X3, Good concentration    HOSPITAL MEDICATIONS:  MEDICATIONS  (STANDING):  bisacodyl 5 milliGRAM(s) Oral at bedtime  buDESOnide    Inhalation Suspension 0.5 milliGRAM(s) Inhalation daily  chlorhexidine 2% Cloths 1 Application(s) Topical daily  fluticasone propionate 50 MICROgram(s)/spray Nasal Spray 1 Spray(s) Both Nostrils two times a day  influenza   Vaccine 0.5 milliLiter(s) IntraMuscular once  lidocaine   4% Patch 1 Patch Transdermal daily  lidocaine 1%/epinephrine 1:100,000 Inj 20 milliLiter(s) Local Injection once  linezolid    Tablet 600 milliGRAM(s) Oral every 12 hours  montelukast 10 milliGRAM(s) Oral daily  pantoprazole    Tablet 40 milliGRAM(s) Oral before breakfast  polyethylene glycol 3350 17 Gram(s) Oral two times a day  remodulin (treprostinil) SubQ infusion 1 Dose(s) 1 Dose(s) SubCutaneous Continuous Pump  rivaroxaban 15 milliGRAM(s) Oral with dinner  senna 2 Tablet(s) Oral at bedtime  sildenafil (REVATIO) 20 milliGRAM(s) Oral every 8 hours  spironolactone 50 milliGRAM(s) Oral two times a day    MEDICATIONS  (PRN):  acetaminophen     Tablet .. 1000 milliGRAM(s) Oral every 6 hours PRN Mild Pain (1 - 3), Moderate Pain (4 - 6)  albuterol    90 MICROgram(s) HFA Inhaler 2 Puff(s) Inhalation every 6 hours PRN Shortness of Breath  albuterol/ipratropium for Nebulization 3 milliLiter(s) Nebulizer every 6 hours PRN Shortness of Breath  melatonin 5 milliGRAM(s) Oral at bedtime PRN Insomnia  oxyCODONE    IR 2.5 milliGRAM(s) Oral every 6 hours PRN Severe Pain (7 - 10)      LABS:  04-07    137  |  100  |  11  ----------------------------<  79  4.4   |  23  |  1.08  04-06    135  |  104  |  7   ----------------------------<  89  4.3   |  21[L]  |  0.95  04-05    136  |  101  |  6[L]  ----------------------------<  75  3.6   |  23  |  0.91    Ca    9.0      07 Apr 2025 06:18  Ca    8.3[L]      06 Apr 2025 09:25  Ca    8.1[L]      05 Apr 2025 10:20  Phos  3.3     04-07  Mg     1.9     04-07      Magnesium: 1.9 mg/dL (04-07-25 @ 06:18)    Phosphorus: 3.3 mg/dL (04-07-25 @ 06:18)  Phosphorus: 2.7 mg/dL (04-06-25 @ 09:25)  Phosphorus: 3.1 mg/dL (04-05-25 @ 10:20)                    Urinalysis Basic - ( 07 Apr 2025 06:18 )    Color: x / Appearance: x / SG: x / pH: x  Gluc: 79 mg/dL / Ketone: x  / Bili: x / Urobili: x   Blood: x / Protein: x / Nitrite: x   Leuk Esterase: x / RBC: x / WBC x   Sq Epi: x / Non Sq Epi: x / Bacteria: x                              12.5   9.39  )-----------( 440      ( 07 Apr 2025 06:17 )             40.7                         11.4   6.45  )-----------( 360      ( 06 Apr 2025 09:25 )             37.4                         11.9   6.11  )-----------( 363      ( 05 Apr 2025 10:20 )             38.2     CAPILLARY BLOOD GLUCOSE         Pulmonary Consult Follow up     Interval Events:    Pt reports increased lightheaded and dizziness with exertion.     REVIEW OF SYSTEMS:  [x] All other systems negative except per HPI   [ ] Unable to assess ROS because ________    OBJECTIVE:  ICU Vital Signs Last 24 Hrs  T(C): 36.6 (07 Apr 2025 11:24), Max: 37.2 (07 Apr 2025 00:13)  T(F): 97.8 (07 Apr 2025 11:24), Max: 99 (07 Apr 2025 00:13)  HR: 86 (07 Apr 2025 16:06) (84 - 100)  BP: 88/62 (07 Apr 2025 16:06) (88/62 - 115/74)  BP(mean): --  ABP: --  ABP(mean): --  RR: 18 (07 Apr 2025 11:24) (18 - 18)  SpO2: 96% (07 Apr 2025 11:24) (96% - 98%)    O2 Parameters below as of 07 Apr 2025 11:24  Patient On (Oxygen Delivery Method): nasal cannula  O2 Flow (L/min): 2            04-06 @ 07:01  -  04-07 @ 07:00  --------------------------------------------------------  IN: 240 mL / OUT: 400 mL / NET: -160 mL        PHYSICAL EXAM:  GENERAL: NAD, well-groomed, well-developed  HEAD:  Atraumatic, Normocephalic  EYES: EOMI, conjunctiva and sclera clear  ENMT: Moist mucous membranes  CHEST/LUNG: Clear to auscultation bilaterally  HEART: Regular rate and rhythm   ABDOMEN: Nondistended. dressing clean dry intact.  VASCULAR: No  cyanosis, or edema  SKIN: No rashes or lesions  NERVOUS SYSTEM:  Alert & Oriented X3, Good concentration    HOSPITAL MEDICATIONS:  MEDICATIONS  (STANDING):  bisacodyl 5 milliGRAM(s) Oral at bedtime  buDESOnide    Inhalation Suspension 0.5 milliGRAM(s) Inhalation daily  chlorhexidine 2% Cloths 1 Application(s) Topical daily  fluticasone propionate 50 MICROgram(s)/spray Nasal Spray 1 Spray(s) Both Nostrils two times a day  influenza   Vaccine 0.5 milliLiter(s) IntraMuscular once  lidocaine   4% Patch 1 Patch Transdermal daily  lidocaine 1%/epinephrine 1:100,000 Inj 20 milliLiter(s) Local Injection once  linezolid    Tablet 600 milliGRAM(s) Oral every 12 hours  montelukast 10 milliGRAM(s) Oral daily  pantoprazole    Tablet 40 milliGRAM(s) Oral before breakfast  polyethylene glycol 3350 17 Gram(s) Oral two times a day  remodulin (treprostinil) SubQ infusion 1 Dose(s) 1 Dose(s) SubCutaneous Continuous Pump  rivaroxaban 15 milliGRAM(s) Oral with dinner  senna 2 Tablet(s) Oral at bedtime  sildenafil (REVATIO) 20 milliGRAM(s) Oral every 8 hours  spironolactone 50 milliGRAM(s) Oral two times a day    MEDICATIONS  (PRN):  acetaminophen     Tablet .. 1000 milliGRAM(s) Oral every 6 hours PRN Mild Pain (1 - 3), Moderate Pain (4 - 6)  albuterol    90 MICROgram(s) HFA Inhaler 2 Puff(s) Inhalation every 6 hours PRN Shortness of Breath  albuterol/ipratropium for Nebulization 3 milliLiter(s) Nebulizer every 6 hours PRN Shortness of Breath  melatonin 5 milliGRAM(s) Oral at bedtime PRN Insomnia  oxyCODONE    IR 2.5 milliGRAM(s) Oral every 6 hours PRN Severe Pain (7 - 10)      LABS:  04-07    137  |  100  |  11  ----------------------------<  79  4.4   |  23  |  1.08  04-06    135  |  104  |  7   ----------------------------<  89  4.3   |  21[L]  |  0.95  04-05    136  |  101  |  6[L]  ----------------------------<  75  3.6   |  23  |  0.91    Ca    9.0      07 Apr 2025 06:18  Ca    8.3[L]      06 Apr 2025 09:25  Ca    8.1[L]      05 Apr 2025 10:20  Phos  3.3     04-07  Mg     1.9     04-07      Magnesium: 1.9 mg/dL (04-07-25 @ 06:18)    Phosphorus: 3.3 mg/dL (04-07-25 @ 06:18)  Phosphorus: 2.7 mg/dL (04-06-25 @ 09:25)  Phosphorus: 3.1 mg/dL (04-05-25 @ 10:20)                    Urinalysis Basic - ( 07 Apr 2025 06:18 )    Color: x / Appearance: x / SG: x / pH: x  Gluc: 79 mg/dL / Ketone: x  / Bili: x / Urobili: x   Blood: x / Protein: x / Nitrite: x   Leuk Esterase: x / RBC: x / WBC x   Sq Epi: x / Non Sq Epi: x / Bacteria: x                              12.5   9.39  )-----------( 440      ( 07 Apr 2025 06:17 )             40.7                         11.4   6.45  )-----------( 360      ( 06 Apr 2025 09:25 )             37.4                         11.9   6.11  )-----------( 363      ( 05 Apr 2025 10:20 )             38.2     CAPILLARY BLOOD GLUCOSE

## 2025-04-07 NOTE — PROGRESS NOTE ADULT - ATTENDING COMMENTS
Abdominal wall abscess s/p bedside I&D- cultures confirm MRSA- continue Linezolid now oral  Chronic hypoxic respiratory failure on home O2 due to pHTN with right sided heart failure- pulmonary following- continue Remodulin, Sildenafil, diuretics  Chronic HAs/dizziness- CT head unremarkable  Asthma- continue Pulmicort, Singulair, Duonebs PRN, Flonase  DVT history- continue Xarelto  D/C home pending prior authorization for PO linezolid

## 2025-04-07 NOTE — PROGRESS NOTE ADULT - ASSESSMENT
43 y/o female, PMH pHTN (on treprostinil infusion), HF (s/p PPM dual chamber 2016), chronic PE (dx 2017) on DOAC, SVT (s/p ablation 05/2020), asthma, h/o MRSA abdominal wall abscesses last 2/'25, and JULIA. was admitted to [facility] 10/13/24-10/29/24 for pre-transplant expedited workup. Sent to ED 4/2/25 from MD for abdominal abscess    CXR (4/2) Perihilar opacities, likely represents enlarged pulmonary arteries. Otherwise clear lungs.    CT A/P (4/2) Peripherally enhancing lesion in the right mid abdominal wall subjacent to diffuse skin thickening measuring 5.0 x 2.1 x 4.0 cm consistent with phlegmon/abscess. Underlying mass is not excluded. Superficial skin thickening in the left abdominal wall at the level of   the umbilicus. Underlying small abscess measures 8 mm.    s/p bedside abdominal wall abscess I&D on 4/3    #MRSA Abdominal Wall Abscess  -- 4/3 s/p abdominal wall abscess I&D- cx with MRSA . doxy/linezolid susceptible  --Continue Linezolid 600 BID but po. 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   --Continue to follow CBC with diff  --Continue to follow temperature curve  --Follow up on preliminary blood cultures  -- anticipate treating with Linezolid through 4/16/25 (14 day course from I&D)  -- once healed, will attempt hibiclens decolonization, instructions given to patient. please prescribe prior to discharge    #Pre-Lung Transplant Evaluation  COVID19 Nucleocapsid Antibody Negative  COVID19 Rajinder Antibody Positive  HAV IgG Negative  HBVs Ab Negative  HBVsAg Negative  HBVc Ab Negative  HCV Ab Negative  HSV 1 IgG Positive  HSV 2 IgG Negative  EBV IgG Positive  CMV IgG Positive  VZV IgG Positive  Measles IgG Positive  Mumps IgG Positive  Rubella IgG Positive  Quantiferon Gold Negative  HIV Ag/Ab by [facility] Negative  Syphilis Screen Negative  Toxoplasma IgG Negative  Strongyloides Ab Negative  Trypanosoma cruzi Ab Negative  --ID clearance for lung transplant pending treatment of abdominal wall abscess    #Encounter to Vaccinate Patient  COVID19: Would benefit from COVID19 4860-6474 Vaccine Dose  Influenza: needs this , can be given at pulmonology office, deferring today not to give 2 reactogenic vaccines  RSV: Arexvy 3/6/25  Pneumococcal: States she had pneumococcal vaccination ~2022  HAV: Would benefit from Havrix  HBV: Heplisav Dose 1 3/6/25. Dose 2 of Heplisav prior to discharge  MMR: Rubella IgG pending. Mumps and Measles immune  Varicella: Immune will not require further vaccination  Shingles: Will require Shingrix  Tdap: Will require Tdap    Transplant ID will not follow patient regularly going forward. Please feel free to reach out with any further questions or concerns.    Rogelio Gonsales M.D.  [facility] Division of Infectious Disease  8AM-5PM Monday - Friday: Available on [employer] Teams  After Hours and Holidays (or if no response on [employer] Teams): Please contact the Infectious Diseases Office at [phone]

## 2025-04-07 NOTE — PROGRESS NOTE ADULT - PROBLEM SELECTOR PLAN 3
Seen outpatient by Dr Yoon who sent into the hospital   Has Remodulin pump near sight of infection  Also take sildenafil     PLAN  - Pulmonary following   - -continue home sildenafil 20mg TID  - continue home Remodulin 51ng/kg/min on home pump  - continue home Bumex, aldactone Seen outpatient by Dr Yoon who sent into the hospital   Has Remodulin pump near sight of infection  Also take sildenafil     PLAN  - Pulmonary following   - -continue home sildenafil 20mg TID  - continue home Remodulin 51ng/kg/min on home pump  - continue home aldactone  - HOLDING bumex for hypotension and dizziness

## 2025-04-07 NOTE — PROGRESS NOTE ADULT - ASSESSMENT
44F w/ PMH of pHTN on Remodulin sq, sildenafil, UE DVT on Xarelto, Asthma, CHF  She is also following with transplant pulmonary and is finalizing pre-transplant evaluation.  Comes to the ED with concern with concern for abdominal abscesses. She was treated for this in the outpatient setting with doxycycline for the past 5 days without improvement.  Now being admitted for I&D and likely IV abx.  Skin lesions first started about 2 weeks ago. No fevers, chills, however local tenderness.    # pHTN  Patient has a known history of PAH on sildenfail, remodulin, diuretics. Also on O2 at home, 2-3lpm.   - continue home sildenafil 20mg TID  - continue home Remodulin 51ng/kg/min on home pump  - Please hold diuretics given hypotension and dizziness. Pt appears euvolemic.  - monitor K, Na, Mg  - I&D performed on 4/3, awaiting cultures  - in case of major surgery plans, please let pulmonary know, as patient might need adjustment to these medications  - no inpatient pulmonary plans    # asthma  Patient with known asthma, last received Fasenra on 4/1. PFTs from 1/2025 w/ severe obstruction, low DLCO.  - continue home equivalent pulmicort, spiriva, PRN atrovent nebs  - continue flonase   44F w/ PMH of pHTN on Remodulin sq, sildenafil, UE DVT on Xarelto, Asthma, CHF  She is also following with transplant pulmonary and is finalizing pre-transplant evaluation.  Comes to the ED with concern with concern for abdominal abscesses. She was treated for this in the outpatient setting with doxycycline for the past 5 days without improvement.  Now being admitted for I&D and likely IV abx.  Skin lesions first started about 2 weeks ago. No fevers, chills, however local tenderness.    # pHTN  Patient has a known history of PAH on sildenfail, remodulin, diuretics. Also on O2 at home, 2-3lpm.   - continue home sildenafil 20mg TID  - continue home Remodulin 51ng/kg/min on home pump  - Please hold diuretics given hypotension and dizziness. Pt appears euvolemic.  - Please check BNP, TTE, Blood cultures, CRP, ESR, Ferritin  - Please consult heart failure  - monitor K, Na, Mg  - I&D performed on 4/3, cultures with MRSA  - in case of major surgery plans, please let pulmonary know, as patient might need adjustment to these medications       # asthma  Patient with known asthma, last received Fasenra on 4/1. PFTs from 1/2025 w/ severe obstruction, low DLCO.  - continue home equivalent pulmicort, spiriva, PRN atrovent nebs  - continue flonase

## 2025-04-07 NOTE — PROCEDURE NOTE - NSSIZEINFR_GEN_A_CORE
Plantar fasciitis     Bilateral feet    Restless legs syndrome     Found out at sleep study. Became worse after back surgery snd now take Gabopentin at bedstime    Salzmann's nodular dystrophy     B/L eyes     SURGICAL HISTORY:  Past Surgical History:   Procedure Laterality Date    BREAST SURGERY  1980    Reduction    CARPAL TUNNEL RELEASE      B/L    CATARACT EXTRACTION Right 09/11/2024    CATARACT REMOVAL Left 12/2021    CHOLECYSTECTOMY, LAPAROSCOPIC N/A 09/28/2022    LAP CHOLECYSTECTOMY POSS OPEN performed by Luksa Cazares DO at [facility] OR    EYE SURGERY Left 10/2021    Debridement due to napoleon nodular dystrophy    EYE SURGERY Right 01/2022    Debridement due to napoleon nodular dystrophy    EYE SURGERY  [id number]    Bilateral debridement for Napoleon Nodular Dystrophy and left eye.cataract    FINGER SURGERY      Trigger thumb left hand    FINGER TRIGGER RELEASE  11/2017    HYSTERECTOMY (CERVIX STATUS UNKNOWN)  1987    BSO, total    HYSTERECTOMY, TOTAL ABDOMINAL (CERVIX REMOVED)  1987    JOINT REPLACEMENT Left 11/15/2024    Dr. Wright    KNEE SURGERY      KNEE SURGERY Left 7/13-20    dr wright     LAMINECTOMY  08/17/2020    Dr Nash     LAPAROTOMY N/A 02/12/2024    EXPLORATORY LAPAROTOMY, RELEASE OF OBSTRUCTION performed by Lukas Cazares DO at [facility] OR    OVARY REMOVAL Bilateral 1987    total    TONSILLECTOMY AND ADENOIDECTOMY       SOCIAL HISTORY:  Social History     Tobacco Use    Smoking status: Never    Smokeless tobacco: Never   Vaping Use    Vaping status: Never Used   Substance Use Topics    Alcohol use: Not Currently     Comment: Maybe one drink a month    Drug use: No     ALLERGIES:  Allergies   Allergen Reactions    Neomycin Itching     Eye swelling  Eye swelling    Other      Opthaine--Causes eye swelling.     FAMILY HISTORY:  Family History   Problem Relation Age of Onset    Heart Disease Mother         Complete heart block. Had pacemaker    Arthritis Mother     Vision Loss Mother         Dry  7

## 2025-04-07 NOTE — PROGRESS NOTE ADULT - SUBJECTIVE AND OBJECTIVE BOX
PROGRESS NOTE:   Authored by Dr. Rivera Carter MD     Patient is a 44y old  Female who presents with a chief complaint of MRSA Abdominal Wall Abscess (03 Apr 2025 11:06)      SUBJECTIVE / OVERNIGHT EVENTS:  No acute events overnight.     MEDICATIONS  (STANDING):  buDESOnide    Inhalation Suspension 0.5 milliGRAM(s) Inhalation daily  buMETAnide 1 milliGRAM(s) Oral daily  chlorhexidine 2% Cloths 1 Application(s) Topical daily  fluticasone propionate 50 MICROgram(s)/spray Nasal Spray 1 Spray(s) Both Nostrils two times a day  influenza   Vaccine 0.5 milliLiter(s) IntraMuscular once  lidocaine   4% Patch 1 Patch Transdermal daily  lidocaine 1%/epinephrine 1:100,000 Inj 20 milliLiter(s) Local Injection once  linezolid    Tablet 600 milliGRAM(s) Oral every 12 hours  montelukast 10 milliGRAM(s) Oral daily  pantoprazole    Tablet 40 milliGRAM(s) Oral before breakfast  remodulin (treprostinil) SubQ infusion 1 Dose(s) 1 Dose(s) SubCutaneous Continuous Pump  rivaroxaban 15 milliGRAM(s) Oral with dinner  sildenafil (REVATIO) 20 milliGRAM(s) Oral every 8 hours  spironolactone 50 milliGRAM(s) Oral two times a day    MEDICATIONS  (PRN):  acetaminophen     Tablet .. 1000 milliGRAM(s) Oral every 6 hours PRN Mild Pain (1 - 3), Moderate Pain (4 - 6)  albuterol    90 MICROgram(s) HFA Inhaler 2 Puff(s) Inhalation every 6 hours PRN Shortness of Breath  albuterol/ipratropium for Nebulization 3 milliLiter(s) Nebulizer every 6 hours PRN Shortness of Breath  HYDROmorphone  Injectable 0.5 milliGRAM(s) IV Push every 4 hours PRN Severe Pain (7 - 10)  melatonin 5 milliGRAM(s) Oral at bedtime PRN Insomnia      CAPILLARY BLOOD GLUCOSE        I&O's Summary    06 Apr 2025 07:01  -  07 Apr 2025 07:00  --------------------------------------------------------  IN: 240 mL / OUT: 400 mL / NET: -160 mL        PHYSICAL EXAM:  Vital Signs Last 24 Hrs  T(C): 36.8 (07 Apr 2025 05:37), Max: 37.2 (07 Apr 2025 00:13)  T(F): 98.3 (07 Apr 2025 05:37), Max: 99 (07 Apr 2025 00:13)  HR: 95 (07 Apr 2025 05:37) (80 - 95)  BP: 115/74 (07 Apr 2025 05:37) (92/67 - 115/74)  BP(mean): --  RR: 18 (07 Apr 2025 05:37) (18 - 18)  SpO2: 97% (07 Apr 2025 05:37) (97% - 99%)    Parameters below as of 07 Apr 2025 05:37  Patient On (Oxygen Delivery Method): nasal cannula        CONSTITUTIONAL: NAD  HEET: MMM, EOMI, PERRLA  NECK: supple  RESPIRATORY: Normal respiratory effort; lungs are clear to auscultation bilaterally  CARDIOVASCULAR: Regular rate and rhythm, normal S1 and S2, no murmur/rub/gallop; No lower extremity edema; Peripheral pulses are 2+ bilaterally  ABDOMEN: Nontender to palpation, normoactive bowel sounds, no rebound/guarding; No hepatosplenomegaly  MUSCULOSKELETAL: no clubbing or cyanosis of digits; no joint swelling or tenderness to palpation  PSYCH: A+O to person, place, and time; affect appropriate  SKIN: No rash    LABS:                        12.5   9.39  )-----------( 440      ( 07 Apr 2025 06:17 )             40.7     04-07    137  |  100  |  11  ----------------------------<  79  4.4   |  23  |  1.08    Ca    9.0      07 Apr 2025 06:18  Phos  3.3     04-07  Mg     1.9     04-07            Urinalysis Basic - ( 07 Apr 2025 06:18 )    Color: x / Appearance: x / SG: x / pH: x  Gluc: 79 mg/dL / Ketone: x  / Bili: x / Urobili: x   Blood: x / Protein: x / Nitrite: x   Leuk Esterase: x / RBC: x / WBC x   Sq Epi: x / Non Sq Epi: x / Bacteria: x          Tele Reviewed:    RADIOLOGY & ADDITIONAL TESTS:  Results Reviewed:   Imaging Personally Reviewed:  Electrocardiogram Personally Reviewed:     PROGRESS NOTE:   Authored by Dr. Rivera Carter MD     Patient is a 44y old  Female who presents with a chief complaint of MRSA Abdominal Wall Abscess (03 Apr 2025 11:06)      SUBJECTIVE / OVERNIGHT EVENTS:  No acute events overnight.   Seen at bedside still having headaches and dizziness and has also not had a BM but could go home today if the prior auth    MEDICATIONS  (STANDING):  buDESOnide    Inhalation Suspension 0.5 milliGRAM(s) Inhalation daily  buMETAnide 1 milliGRAM(s) Oral daily  chlorhexidine 2% Cloths 1 Application(s) Topical daily  fluticasone propionate 50 MICROgram(s)/spray Nasal Spray 1 Spray(s) Both Nostrils two times a day  influenza   Vaccine 0.5 milliLiter(s) IntraMuscular once  lidocaine   4% Patch 1 Patch Transdermal daily  lidocaine 1%/epinephrine 1:100,000 Inj 20 milliLiter(s) Local Injection once  linezolid    Tablet 600 milliGRAM(s) Oral every 12 hours  montelukast 10 milliGRAM(s) Oral daily  pantoprazole    Tablet 40 milliGRAM(s) Oral before breakfast  remodulin (treprostinil) SubQ infusion 1 Dose(s) 1 Dose(s) SubCutaneous Continuous Pump  rivaroxaban 15 milliGRAM(s) Oral with dinner  sildenafil (REVATIO) 20 milliGRAM(s) Oral every 8 hours  spironolactone 50 milliGRAM(s) Oral two times a day    MEDICATIONS  (PRN):  acetaminophen     Tablet .. 1000 milliGRAM(s) Oral every 6 hours PRN Mild Pain (1 - 3), Moderate Pain (4 - 6)  albuterol    90 MICROgram(s) HFA Inhaler 2 Puff(s) Inhalation every 6 hours PRN Shortness of Breath  albuterol/ipratropium for Nebulization 3 milliLiter(s) Nebulizer every 6 hours PRN Shortness of Breath  HYDROmorphone  Injectable 0.5 milliGRAM(s) IV Push every 4 hours PRN Severe Pain (7 - 10)  melatonin 5 milliGRAM(s) Oral at bedtime PRN Insomnia      CAPILLARY BLOOD GLUCOSE        I&O's Summary    06 Apr 2025 07:01  -  07 Apr 2025 07:00  --------------------------------------------------------  IN: 240 mL / OUT: 400 mL / NET: -160 mL        PHYSICAL EXAM:  Vital Signs Last 24 Hrs  T(C): 36.8 (07 Apr 2025 05:37), Max: 37.2 (07 Apr 2025 00:13)  T(F): 98.3 (07 Apr 2025 05:37), Max: 99 (07 Apr 2025 00:13)  HR: 95 (07 Apr 2025 05:37) (80 - 95)  BP: 115/74 (07 Apr 2025 05:37) (92/67 - 115/74)  BP(mean): --  RR: 18 (07 Apr 2025 05:37) (18 - 18)  SpO2: 97% (07 Apr 2025 05:37) (97% - 99%)    Parameters below as of 07 Apr 2025 05:37  Patient On (Oxygen Delivery Method): nasal cannula        CONSTITUTIONAL: NAD  HEET: MMM, EOMI, PERRLA  NECK: supple  RESPIRATORY: Normal respiratory effort; lungs are clear to auscultation bilaterally  CARDIOVASCULAR: Regular rate and rhythm, normal S1 and S2, no murmur/rub/gallop; No lower extremity edema; Peripheral pulses are 2+ bilaterally  ABDOMEN: Nontender to palpation, normoactive bowel sounds, no rebound/guarding; No hepatosplenomegaly  MUSCULOSKELETAL: no clubbing or cyanosis of digits; no joint swelling or tenderness to palpation  PSYCH: A+O to person, place, and time; affect appropriate  SKIN: No rash    LABS:                        12.5   9.39  )-----------( 440      ( 07 Apr 2025 06:17 )             40.7     04-07    137  |  100  |  11  ----------------------------<  79  4.4   |  23  |  1.08    Ca    9.0      07 Apr 2025 06:18  Phos  3.3     04-07  Mg     1.9     04-07            Urinalysis Basic - ( 07 Apr 2025 06:18 )    Color: x / Appearance: x / SG: x / pH: x  Gluc: 79 mg/dL / Ketone: x  / Bili: x / Urobili: x   Blood: x / Protein: x / Nitrite: x   Leuk Esterase: x / RBC: x / WBC x   Sq Epi: x / Non Sq Epi: x / Bacteria: x          Tele Reviewed:    RADIOLOGY & ADDITIONAL TESTS:  Results Reviewed:   Imaging Personally Reviewed:  Electrocardiogram Personally Reviewed:

## 2025-04-07 NOTE — PROGRESS NOTE ADULT - PROBLEM SELECTOR PLAN 5
2/2 to pulmonary hypertension   Seen by Dr Yoon outpatient       PLAN  - Hold home nifedipine for low BP  - continue home Bumex and aldactone per pulm 2/2 to pulmonary hypertension   Seen by Dr Yoon outpatient       PLAN  - Hold home nifedipine for low BP  - continue aldactone per pulm  - holding bumex for hypotension

## 2025-04-07 NOTE — PROGRESS NOTE ADULT - ASSESSMENT
44y F, with PMH of pHTN (on treprostinil infusion still continuing ), Right  Heart failure(s/p PPM dual chamber ), chronic PE (dx ) on DOAC, SVT (s/p ablation 2020), asthma, h/o MRSA abdominal wall abscesses last , and JULIA presenting for treatment of new abdominal abscess s/p I&D drainage on abx treatment wound culture MRSA and pending dispo with oral linezolid

## 2025-04-08 DIAGNOSIS — I95.9 HYPOTENSION, UNSPECIFIED: ICD-10-CM

## 2025-04-08 DIAGNOSIS — K59.00 CONSTIPATION, UNSPECIFIED: ICD-10-CM

## 2025-04-08 LAB
ADD ON TEST-SPECIMEN IN LAB: SIGNIFICANT CHANGE UP
ANION GAP SERPL CALC-SCNC: 14 MMOL/L — SIGNIFICANT CHANGE UP (ref 5–17)
AT III AG PPP IA-MCNC: 22 MG/DL — SIGNIFICANT CHANGE UP (ref 19–31)
BUN SERPL-MCNC: 9 MG/DL — SIGNIFICANT CHANGE UP (ref 7–23)
CALCIUM SERPL-MCNC: 9.1 MG/DL — SIGNIFICANT CHANGE UP (ref 8.4–10.5)
CHLORIDE SERPL-SCNC: 99 MMOL/L — SIGNIFICANT CHANGE UP (ref 96–108)
CO2 SERPL-SCNC: 23 MMOL/L — SIGNIFICANT CHANGE UP (ref 22–31)
CREAT SERPL-MCNC: 0.98 MG/DL — SIGNIFICANT CHANGE UP (ref 0.5–1.3)
CRP SERPL-MCNC: <3 MG/L — SIGNIFICANT CHANGE UP (ref 0–4)
CULTURE RESULTS: ABNORMAL
EGFR: 73 ML/MIN/1.73M2 — SIGNIFICANT CHANGE UP
EGFR: 73 ML/MIN/1.73M2 — SIGNIFICANT CHANGE UP
ERYTHROCYTE [SEDIMENTATION RATE] IN BLOOD: 44 MM/HR — HIGH (ref 0–15)
FERRITIN SERPL-MCNC: 15 NG/ML — SIGNIFICANT CHANGE UP (ref 15–150)
GLUCOSE SERPL-MCNC: 68 MG/DL — LOW (ref 70–99)
HCT VFR BLD CALC: 39.3 % — SIGNIFICANT CHANGE UP (ref 34.5–45)
HGB BLD-MCNC: 12.5 G/DL — SIGNIFICANT CHANGE UP (ref 11.5–15.5)
MAGNESIUM SERPL-MCNC: 1.9 MG/DL — SIGNIFICANT CHANGE UP (ref 1.6–2.6)
MCHC RBC-ENTMCNC: 22.4 PG — LOW (ref 27–34)
MCHC RBC-ENTMCNC: 31.8 G/DL — LOW (ref 32–36)
MCV RBC AUTO: 70.3 FL — LOW (ref 80–100)
NRBC BLD AUTO-RTO: 0 /100 WBCS — SIGNIFICANT CHANGE UP (ref 0–0)
NT-PROBNP SERPL-SCNC: 53 PG/ML — SIGNIFICANT CHANGE UP (ref 0–300)
ORGANISM # SPEC MICROSCOPIC CNT: ABNORMAL
ORGANISM # SPEC MICROSCOPIC CNT: ABNORMAL
PHOSPHATE SERPL-MCNC: 3 MG/DL — SIGNIFICANT CHANGE UP (ref 2.5–4.5)
PLATELET # BLD AUTO: 400 K/UL — SIGNIFICANT CHANGE UP (ref 150–400)
POTASSIUM SERPL-MCNC: 4.5 MMOL/L — SIGNIFICANT CHANGE UP (ref 3.5–5.3)
POTASSIUM SERPL-SCNC: 4.5 MMOL/L — SIGNIFICANT CHANGE UP (ref 3.5–5.3)
PTR INTERPRETATION: SIGNIFICANT CHANGE UP
RBC # BLD: 5.59 M/UL — HIGH (ref 3.8–5.2)
RBC # FLD: 16.6 % — HIGH (ref 10.3–14.5)
SODIUM SERPL-SCNC: 136 MMOL/L — SIGNIFICANT CHANGE UP (ref 135–145)
SPECIMEN SOURCE: SIGNIFICANT CHANGE UP
WBC # BLD: 7.71 K/UL — SIGNIFICANT CHANGE UP (ref 3.8–10.5)
WBC # FLD AUTO: 7.71 K/UL — SIGNIFICANT CHANGE UP (ref 3.8–10.5)

## 2025-04-08 PROCEDURE — 99233 SBSQ HOSP IP/OBS HIGH 50: CPT | Mod: GC

## 2025-04-08 PROCEDURE — 99223 1ST HOSP IP/OBS HIGH 75: CPT

## 2025-04-08 PROCEDURE — G0452: CPT | Mod: 26

## 2025-04-08 PROCEDURE — 71046 X-RAY EXAM CHEST 2 VIEWS: CPT | Mod: 26

## 2025-04-08 RX ORDER — ACETAMINOPHEN 500 MG/5ML
1000 LIQUID (ML) ORAL ONCE
Refills: 0 | Status: COMPLETED | OUTPATIENT
Start: 2025-04-08 | End: 2025-04-08

## 2025-04-08 RX ORDER — ACETAMINOPHEN 500 MG/5ML
1000 LIQUID (ML) ORAL ONCE
Refills: 0 | Status: COMPLETED | OUTPATIENT
Start: 2025-04-08 | End: 2025-04-11

## 2025-04-08 RX ORDER — HYDROMORPHONE/SOD CHLOR,ISO/PF 2 MG/10 ML
0.5 SYRINGE (ML) INJECTION ONCE
Refills: 0 | Status: DISCONTINUED | OUTPATIENT
Start: 2025-04-08 | End: 2025-04-08

## 2025-04-08 RX ORDER — LORAZEPAM 4 MG/ML
0.5 VIAL (ML) INJECTION ONCE
Refills: 0 | Status: COMPLETED | OUTPATIENT
Start: 2025-04-08

## 2025-04-08 RX ADMIN — Medication 0.5 MILLIGRAM(S): at 21:39

## 2025-04-08 RX ADMIN — IPRATROPIUM BROMIDE AND ALBUTEROL SULFATE 3 MILLILITER(S): .5; 2.5 SOLUTION RESPIRATORY (INHALATION) at 00:04

## 2025-04-08 RX ADMIN — LINEZOLID 600 MILLIGRAM(S): 2 INJECTION, SOLUTION INTRAVENOUS at 09:14

## 2025-04-08 RX ADMIN — SILDENAFIL 20 MILLIGRAM(S): 50 TABLET, FILM COATED ORAL at 13:11

## 2025-04-08 RX ADMIN — FLUTICASONE PROPIONATE 1 SPRAY(S): 50 SPRAY, METERED NASAL at 09:14

## 2025-04-08 RX ADMIN — RIVAROXABAN 15 MILLIGRAM(S): 10 TABLET, FILM COATED ORAL at 17:27

## 2025-04-08 RX ADMIN — LIDOCAINE HYDROCHLORIDE 1 PATCH: 20 JELLY TOPICAL at 04:51

## 2025-04-08 RX ADMIN — FLUTICASONE PROPIONATE 1 SPRAY(S): 50 SPRAY, METERED NASAL at 17:28

## 2025-04-08 RX ADMIN — Medication 1 APPLICATION(S): at 12:19

## 2025-04-08 RX ADMIN — MONTELUKAST SODIUM 10 MILLIGRAM(S): 10 TABLET ORAL at 12:16

## 2025-04-08 RX ADMIN — LINEZOLID 600 MILLIGRAM(S): 2 INJECTION, SOLUTION INTRAVENOUS at 17:28

## 2025-04-08 RX ADMIN — Medication 0.5 MILLIGRAM(S): at 20:58

## 2025-04-08 RX ADMIN — IPRATROPIUM BROMIDE AND ALBUTEROL SULFATE 3 MILLILITER(S): .5; 2.5 SOLUTION RESPIRATORY (INHALATION) at 12:16

## 2025-04-08 RX ADMIN — Medication 40 MILLIGRAM(S): at 09:14

## 2025-04-08 RX ADMIN — Medication 5 MILLIGRAM(S): at 00:04

## 2025-04-08 RX ADMIN — Medication 1000 MILLIGRAM(S): at 15:55

## 2025-04-08 RX ADMIN — Medication 400 MILLIGRAM(S): at 15:20

## 2025-04-08 NOTE — PROGRESS NOTE ADULT - SUBJECTIVE AND OBJECTIVE BOX
Pulmonary Consult Follow up     Interval Events:    Still complaining of dizziness and light headedness. Has felt like she might fall when she's trying to ambulate to the bathroom.     Complains of new site of infection in the abdominal wall at the site of her remodulin pump - it is being changed to her arm.     REVIEW OF SYSTEMS:  [x] All other systems negative except per HPI   [ ] Unable to assess ROS because ________    OBJECTIVE:  ICU Vital Signs Last 24 Hrs  T(C): 36.6 (08 Apr 2025 14:26), Max: 37 (07 Apr 2025 20:13)  T(F): 97.9 (08 Apr 2025 14:26), Max: 98.6 (07 Apr 2025 20:13)  HR: 98 (08 Apr 2025 14:26) (86 - 105)  BP: 93/63 (08 Apr 2025 14:26) (83/61 - 109/63)  BP(mean): --  ABP: --  ABP(mean): --  RR: 18 (08 Apr 2025 14:26) (18 - 18)  SpO2: 94% (08 Apr 2025 14:26) (94% - 97%)    O2 Parameters below as of 08 Apr 2025 14:26  Patient On (Oxygen Delivery Method): room air              04-07 @ 07:01  -  04-08 @ 07:00  --------------------------------------------------------  IN: 120 mL / OUT: 0 mL / NET: 120 mL        PHYSICAL EXAM:  GENERAL: NAD, well-groomed, well-developed  HEAD:  Atraumatic, Normocephalic  EYES: EOMI, conjunctiva and sclera clear  ENMT: Moist mucous membranes  CHEST/LUNG: Clear to auscultation bilaterally   HEART: Regular rate and rhythm   ABDOMEN: Area of tenderness at the site of the remodulin pump insertion site with dried blood under the dressing.   VASCULAR: No  cyanosis, or edema  SKIN: No rashes or lesions  NERVOUS SYSTEM:  Alert & Oriented X3, Good concentration    HOSPITAL MEDICATIONS:  MEDICATIONS  (STANDING):  acetaminophen   IVPB .. 1000 milliGRAM(s) IV Intermittent once  buDESOnide    Inhalation Suspension 0.5 milliGRAM(s) Inhalation daily  chlorhexidine 2% Cloths 1 Application(s) Topical daily  fluticasone propionate 50 MICROgram(s)/spray Nasal Spray 1 Spray(s) Both Nostrils two times a day  influenza   Vaccine 0.5 milliLiter(s) IntraMuscular once  lidocaine   4% Patch 1 Patch Transdermal daily  lidocaine 1%/epinephrine 1:100,000 Inj 20 milliLiter(s) Local Injection once  linezolid    Tablet 600 milliGRAM(s) Oral every 12 hours  LORazepam   Injectable 0.5 milliGRAM(s) IV Push once  montelukast 10 milliGRAM(s) Oral daily  pantoprazole    Tablet 40 milliGRAM(s) Oral before breakfast  polyethylene glycol 3350 17 Gram(s) Oral two times a day  remodulin (treprostinil) SubQ infusion 1 Dose(s) 1 Dose(s) SubCutaneous Continuous Pump  rivaroxaban 15 milliGRAM(s) Oral with dinner  senna 2 Tablet(s) Oral at bedtime  sildenafil (REVATIO) 20 milliGRAM(s) Oral every 8 hours    MEDICATIONS  (PRN):  acetaminophen     Tablet .. 1000 milliGRAM(s) Oral every 6 hours PRN Mild Pain (1 - 3), Moderate Pain (4 - 6)  albuterol    90 MICROgram(s) HFA Inhaler 2 Puff(s) Inhalation every 6 hours PRN Shortness of Breath  albuterol/ipratropium for Nebulization 3 milliLiter(s) Nebulizer every 6 hours PRN Shortness of Breath  melatonin 5 milliGRAM(s) Oral at bedtime PRN Insomnia  oxyCODONE    IR 2.5 milliGRAM(s) Oral every 6 hours PRN Severe Pain (7 - 10)      LABS:  04-08    136  |  99  |  9   ----------------------------<  68[L]  4.5   |  23  |  0.98  04-07    137  |  100  |  11  ----------------------------<  79  4.4   |  23  |  1.08  04-06    135  |  104  |  7   ----------------------------<  89  4.3   |  21[L]  |  0.95    Ca    9.1      08 Apr 2025 10:36  Ca    9.0      07 Apr 2025 06:18  Ca    8.3[L]      06 Apr 2025 09:25  Phos  3.0     04-08  Mg     1.9     04-08      Magnesium: 1.9 mg/dL (04-08-25 @ 10:36)  Magnesium: 1.9 mg/dL (04-07-25 @ 06:18)    Phosphorus: 3.0 mg/dL (04-08-25 @ 10:36)  Phosphorus: 3.3 mg/dL (04-07-25 @ 06:18)  Phosphorus: 2.7 mg/dL (04-06-25 @ 09:25)                    Urinalysis Basic - ( 08 Apr 2025 10:36 )    Color: x / Appearance: x / SG: x / pH: x  Gluc: 68 mg/dL / Ketone: x  / Bili: x / Urobili: x   Blood: x / Protein: x / Nitrite: x   Leuk Esterase: x / RBC: x / WBC x   Sq Epi: x / Non Sq Epi: x / Bacteria: x                              12.5   7.71  )-----------( 400      ( 08 Apr 2025 10:36 )             39.3                         12.5   9.39  )-----------( 440      ( 07 Apr 2025 06:17 )             40.7                         11.4   6.45  )-----------( 360      ( 06 Apr 2025 09:25 )             37.4     CAPILLARY BLOOD GLUCOSE

## 2025-04-08 NOTE — CONSULT NOTE ADULT - SUBJECTIVE AND OBJECTIVE BOX
Patient is a 44y old  Female who presents with a chief complaint of MRSA Abdominal Wall Abscess (2025 11:06)    HPI:  43 y/o F h/o PE on Xarelto, pulmonary HTN (likely group I/III; on remodulin/sildenafil), recurrent absceses at site of remodulin, ILD (on home O2), JULIA, obesity (improved), prior AVNRT s/p ablation (), bradycardia s/p dual chamber PPM, prior RA clot s/p aspiration () who presented on  wiht abdominal pain found to have abcess. Requied I&D and line switched to arm. Was receiving bumex and became hypotensive today. Feels otherwise well. Undergoing eval for lung transplant. Denies fevers/chills.         PAST MEDICAL & SURGICAL HISTORY:  Anemia      Pulmonary hypertension      Asthma  On home O2 nightly at 2L via NC      PE (pulmonary thromboembolism)  on Xarelto 10 mg daily      Sinusitis      Right heart failure      H/O pulmonary hypertension      Pulmonary embolism      History of tachycardia      On supplemental oxygen by nasal cannula  O2 @ 2L      Pacemaker      Right atrial thrombus      Hypotension      Pacemaker      History of pacemaker   - [employer] model Ingevity 4469          FAMILY HISTORY:  Family history of diabetes mellitus  in brother        Home Medications:  Atrovent 18 mcg/inh inhalation aerosol: 1 inhaled 4 times a day as needed for  shortness of breath and/or wheezing (2025 16:24)  omeprazole 40 mg oral delayed release capsule: 1 cap(s) orally once a day (2025 16:24)  Remodulin 5 mg/mL injectable solution: 1 application injectable once a day via her own SQ PUMP (2025 16:24)  sildenafil 20 mg oral tablet: 1 tab(s) orally every 8 hours (2025 16:24)  Xarelto 15 mg oral tablet: 1 tab(s) orally once a day resume tomorrow  (2025 16:24)      Medications:  acetaminophen     Tablet .. 1000 milliGRAM(s) Oral every 6 hours PRN  acetaminophen   IVPB .. 1000 milliGRAM(s) IV Intermittent once  albuterol    90 MICROgram(s) HFA Inhaler 2 Puff(s) Inhalation every 6 hours PRN  albuterol/ipratropium for Nebulization 3 milliLiter(s) Nebulizer every 6 hours PRN  buDESOnide    Inhalation Suspension 0.5 milliGRAM(s) Inhalation daily  chlorhexidine 2% Cloths 1 Application(s) Topical daily  fluticasone propionate 50 MICROgram(s)/spray Nasal Spray 1 Spray(s) Both Nostrils two times a day  influenza   Vaccine 0.5 milliLiter(s) IntraMuscular once  lidocaine   4% Patch 1 Patch Transdermal daily  lidocaine 1%/epinephrine 1:100,000 Inj 20 milliLiter(s) Local Injection once  linezolid    Tablet 600 milliGRAM(s) Oral every 12 hours  LORazepam   Injectable 0.5 milliGRAM(s) IV Push once  melatonin 5 milliGRAM(s) Oral at bedtime PRN  montelukast 10 milliGRAM(s) Oral daily  oxyCODONE    IR 2.5 milliGRAM(s) Oral every 6 hours PRN  pantoprazole    Tablet 40 milliGRAM(s) Oral before breakfast  polyethylene glycol 3350 17 Gram(s) Oral two times a day  remodulin (treprostinil) SubQ infusion 1 Dose(s) 1 Dose(s) SubCutaneous Continuous Pump  rivaroxaban 15 milliGRAM(s) Oral with dinner  senna 2 Tablet(s) Oral at bedtime      Review of systems:  10 point review of systems completed and are negative unless noted in HPI    Vitals:  T(C): 36.7 (25 @ 21:35), Max: 36.8 (25 @ 23:34)  HR: 87 (25 @ 21:35) (87 - 105)  BP: 103/74 (25 @ 21:35) (83/61 - 103/74)  BP(mean): --  RR: 18 (25 @ 21:35) (18 - 18)  SpO2: 96% (25 @ 21:35) (94% - 97%)    Daily     Daily Weight in k.6 (2025 09:29)        I&O's Summary    2025 07:01  -  2025 07:00  --------------------------------------------------------  IN: 120 mL / OUT: 0 mL / NET: 120 mL    2025 07:01  -  2025 21:44  --------------------------------------------------------  IN: 1000 mL / OUT: 0 mL / NET: 1000 mL        Physical Exam:  Appearance: No Acute Distress  HEENT: PERRL  Neck: JVP 6 w/o HJR  Cardiovascular: Normal S1 S2, No murmurs/rubs/gallops  Respiratory: Clear to auscultation bilaterally  Gastrointestinal: Soft, Non-tender	  Skin: No cyanosis	  Neurologic: Non-focal  Extremities: No LE edema  Psychiatry: A & O x 3, Mood & affect appropriate    Labs:                        12.5   7.71  )-----------( 400      ( 2025 10:36 )             39.3     04-08    136  |  99  |  9   ----------------------------<  68[L]  4.5   |  23  |  0.98    Ca    9.1      2025 10:36  Phos  3.0     04-08  Mg     1.9     04-08

## 2025-04-08 NOTE — CONSULT NOTE ADULT - ASSESSMENT
45 y/o F h/o PE on Xarelto, pulmonary HTN (likely group I/III; on remodulin/sildenafil), recurrent absceses at site of remodulin, ILD (on home O2), JULIA, obesity (improved), prior AVNRT s/p ablation (2020), bradycardia s/p dual chamber PPM, prior RA clot s/p aspiration (2022) who presented on 4/2 wiht abdominal pain found to have abcess. Requied I&D 4/3 and line switched to arm. Was receiving bumex and became hypotensive today. Feels otherwise well. Undergoing eval for lung transplant.     Pertinent studies:  10/14/24 TTE - nl EF, mod TR, mod-severe RV dysfunction, LVOT VTI 19 cm, IVC 1.4 cm  10/14/24 - CTA - no PE; dilated PA; b/l patchy GGO  7/17/24 - ECG - sinus, biatrial enlargement, RVH, R axis deviation, RV strain  8/16/23 - RA 3, RV 81/3, PA 79/43/57, PCWP 36, LVEDP not obtained; CO/CI 4.3/2.01  7/25/14 - mRCA 40%; /85, RA 13, /27, /52/77, LVEDP 10; Marcela PA 58/30/42 with improvement CI from 1.54 to 2.33  6/2/15 - V/Q scan - low probability of PE

## 2025-04-08 NOTE — PROGRESS NOTE ADULT - PROBLEM SELECTOR PLAN 4
On Xarelto 15 outpatient for previus DVT     PLAN  - restarting per surgery Seen outpatient by Dr Yoon who sent into the hospital   Has Remodulin pump near sight of infection  Also take sildenafil     PLAN  - Pulmonary following   - per pulm - Please check BNP, TTE, Blood cultures, CRP, ESR, Ferritin  - -continue home sildenafil 20mg TID  - continue home Remodulin 51ng/kg/min on home pump  - HOLDING home aldactone  - HOLDING bumex for hypotension and dizziness

## 2025-04-08 NOTE — PROGRESS NOTE ADULT - ASSESSMENT
44y F, with PMH of pHTN (on treprostinil infusion still continuing ), Right  Heart failure(s/p PPM dual chamber ), chronic PE (dx ) on DOAC, SVT (s/p ablation 2020), asthma, h/o MRSA abdominal wall abscesses last , and JULIA presenting for treatment of new abdominal abscess s/p I&D drainage on abx treatment wound culture MRSA and pending dispo with oral linezolid  44y F, with PMH of pHTN (on treprostinil infusion still continuing ), Right  Heart failure(s/p PPM dual chamber ), chronic PE (dx ) on DOAC, SVT (s/p ablation 2020), asthma, h/o MRSA abdominal wall abscesses last , and JULIA presenting for treatment of new abdominal abscess s/p I&D drainage on abx treatment wound culture MRSA and pending dispo with oral linezolid now with new hypotension iso of R sided heart failure and diuretic use

## 2025-04-08 NOTE — PROGRESS NOTE ADULT - PROBLEM SELECTOR PLAN 7
dvt - NONE FOR now pending I&D  Diet - regular   dispo - pending - ongoing pre-lung transplant workup  - seen by pulm transplant service     PLAN  - no inpatient recommendations for now   - home supplemental O2 2L NC, keep sats > 92%, avoid hyperoxia   - consider Cardiac MRI while inpatient as a part of ongoing transplant evaluation

## 2025-04-08 NOTE — PROGRESS NOTE ADULT - PROBLEM SELECTOR PLAN 3
Seen outpatient by Dr Yoon who sent into the hospital   Has Remodulin pump near sight of infection  Also take sildenafil     PLAN  - Pulmonary following   - -continue home sildenafil 20mg TID  - continue home Remodulin 51ng/kg/min on home pump  - continue home aldactone  - HOLDING bumex for hypotension and dizziness At home takes Atrovent nebulizer, Spiriva, Pulmicort?  Pulmonary medicine is following     PLAN  - continue therapeutic interchange Atrovent nebulizer ipratropium PRN   - continue home equivalent Pulmicort, Spiriva, PRN nebs  - continue Flonase  - continue on baseline 2L O2   - continue to watch respiratory status

## 2025-04-08 NOTE — PROGRESS NOTE ADULT - ATTENDING COMMENTS
Abdominal wall abscess s/p bedside I&D- cultures confirmed MRSA- continue Linezolid now oral  Chronic hypoxic respiratory failure on home O2 due to pHTN with right sided heart failure- pulmonary following- continue Remodulin, Sildenafil  Chronic HAs/dizziness- CT head unremarkable  Hypotension- persistent- holding diuretics, check TTE- HF eval requested  Asthma- continue Pulmicort, Singulair, Duonebs PRN, Flonase  DVT history- continue Xarelto  D/C home pending improvement in BP- PA for linezolid obtained

## 2025-04-08 NOTE — PROGRESS NOTE ADULT - SUBJECTIVE AND OBJECTIVE BOX
PROGRESS NOTE:   Authored by Dr. Rivera Carter MD     Patient is a 44y old  Female who presents with a chief complaint of MRSA Abdominal Wall Abscess (03 Apr 2025 11:06)      SUBJECTIVE / OVERNIGHT EVENTS:  No acute events overnight.     MEDICATIONS  (STANDING):  bisacodyl 5 milliGRAM(s) Oral at bedtime  buDESOnide    Inhalation Suspension 0.5 milliGRAM(s) Inhalation daily  chlorhexidine 2% Cloths 1 Application(s) Topical daily  fluticasone propionate 50 MICROgram(s)/spray Nasal Spray 1 Spray(s) Both Nostrils two times a day  influenza   Vaccine 0.5 milliLiter(s) IntraMuscular once  lidocaine   4% Patch 1 Patch Transdermal daily  lidocaine 1%/epinephrine 1:100,000 Inj 20 milliLiter(s) Local Injection once  linezolid    Tablet 600 milliGRAM(s) Oral every 12 hours  montelukast 10 milliGRAM(s) Oral daily  pantoprazole    Tablet 40 milliGRAM(s) Oral before breakfast  polyethylene glycol 3350 17 Gram(s) Oral two times a day  remodulin (treprostinil) SubQ infusion 1 Dose(s) 1 Dose(s) SubCutaneous Continuous Pump  rivaroxaban 15 milliGRAM(s) Oral with dinner  senna 2 Tablet(s) Oral at bedtime  sildenafil (REVATIO) 20 milliGRAM(s) Oral every 8 hours    MEDICATIONS  (PRN):  acetaminophen     Tablet .. 1000 milliGRAM(s) Oral every 6 hours PRN Mild Pain (1 - 3), Moderate Pain (4 - 6)  albuterol    90 MICROgram(s) HFA Inhaler 2 Puff(s) Inhalation every 6 hours PRN Shortness of Breath  albuterol/ipratropium for Nebulization 3 milliLiter(s) Nebulizer every 6 hours PRN Shortness of Breath  melatonin 5 milliGRAM(s) Oral at bedtime PRN Insomnia  oxyCODONE    IR 2.5 milliGRAM(s) Oral every 6 hours PRN Severe Pain (7 - 10)      CAPILLARY BLOOD GLUCOSE        I&O's Summary    07 Apr 2025 07:01  -  08 Apr 2025 07:00  --------------------------------------------------------  IN: 120 mL / OUT: 0 mL / NET: 120 mL        PHYSICAL EXAM:  Vital Signs Last 24 Hrs  T(C): 36.6 (08 Apr 2025 04:19), Max: 37 (07 Apr 2025 20:13)  T(F): 97.9 (08 Apr 2025 04:19), Max: 98.6 (07 Apr 2025 20:13)  HR: 93 (08 Apr 2025 04:19) (86 - 100)  BP: 89/65 (08 Apr 2025 04:19) (86/59 - 109/63)  BP(mean): --  RR: 18 (08 Apr 2025 04:19) (18 - 18)  SpO2: 97% (08 Apr 2025 04:19) (94% - 98%)    Parameters below as of 08 Apr 2025 04:19  Patient On (Oxygen Delivery Method): nasal cannula  O2 Flow (L/min): 2      CONSTITUTIONAL: NAD  HEET: MMM, EOMI, PERRLA  NECK: supple  RESPIRATORY: Normal respiratory effort; lungs are clear to auscultation bilaterally  CARDIOVASCULAR: Regular rate and rhythm, normal S1 and S2, no murmur/rub/gallop; No lower extremity edema; Peripheral pulses are 2+ bilaterally  ABDOMEN: Nontender to palpation, normoactive bowel sounds, no rebound/guarding; No hepatosplenomegaly  MUSCULOSKELETAL: no clubbing or cyanosis of digits; no joint swelling or tenderness to palpation  PSYCH: A+O to person, place, and time; affect appropriate  SKIN: No rash    LABS:                        12.5   9.39  )-----------( 440      ( 07 Apr 2025 06:17 )             40.7     04-07    137  |  100  |  11  ----------------------------<  79  4.4   |  23  |  1.08    Ca    9.0      07 Apr 2025 06:18  Phos  3.3     04-07  Mg     1.9     04-07            Urinalysis Basic - ( 07 Apr 2025 06:18 )    Color: x / Appearance: x / SG: x / pH: x  Gluc: 79 mg/dL / Ketone: x  / Bili: x / Urobili: x   Blood: x / Protein: x / Nitrite: x   Leuk Esterase: x / RBC: x / WBC x   Sq Epi: x / Non Sq Epi: x / Bacteria: x          Tele Reviewed:    RADIOLOGY & ADDITIONAL TESTS:  Results Reviewed:   Imaging Personally Reviewed:  Electrocardiogram Personally Reviewed:     PROGRESS NOTE:   Authored by Dr. Rivera Carter MD     Patient is a 44y old  Female who presents with a chief complaint of MRSA Abdominal Wall Abscess (03 Apr 2025 11:06)      SUBJECTIVE / OVERNIGHT EVENTS:  No acute events overnight.   Seen at bedside and headache improving with minor dizziness, is having some new pain and minor bleeding at the sign of her pump insertion     MEDICATIONS  (STANDING):  bisacodyl 5 milliGRAM(s) Oral at bedtime  buDESOnide    Inhalation Suspension 0.5 milliGRAM(s) Inhalation daily  chlorhexidine 2% Cloths 1 Application(s) Topical daily  fluticasone propionate 50 MICROgram(s)/spray Nasal Spray 1 Spray(s) Both Nostrils two times a day  influenza   Vaccine 0.5 milliLiter(s) IntraMuscular once  lidocaine   4% Patch 1 Patch Transdermal daily  lidocaine 1%/epinephrine 1:100,000 Inj 20 milliLiter(s) Local Injection once  linezolid    Tablet 600 milliGRAM(s) Oral every 12 hours  montelukast 10 milliGRAM(s) Oral daily  pantoprazole    Tablet 40 milliGRAM(s) Oral before breakfast  polyethylene glycol 3350 17 Gram(s) Oral two times a day  remodulin (treprostinil) SubQ infusion 1 Dose(s) 1 Dose(s) SubCutaneous Continuous Pump  rivaroxaban 15 milliGRAM(s) Oral with dinner  senna 2 Tablet(s) Oral at bedtime  sildenafil (REVATIO) 20 milliGRAM(s) Oral every 8 hours    MEDICATIONS  (PRN):  acetaminophen     Tablet .. 1000 milliGRAM(s) Oral every 6 hours PRN Mild Pain (1 - 3), Moderate Pain (4 - 6)  albuterol    90 MICROgram(s) HFA Inhaler 2 Puff(s) Inhalation every 6 hours PRN Shortness of Breath  albuterol/ipratropium for Nebulization 3 milliLiter(s) Nebulizer every 6 hours PRN Shortness of Breath  melatonin 5 milliGRAM(s) Oral at bedtime PRN Insomnia  oxyCODONE    IR 2.5 milliGRAM(s) Oral every 6 hours PRN Severe Pain (7 - 10)      CAPILLARY BLOOD GLUCOSE        I&O's Summary    07 Apr 2025 07:01  -  08 Apr 2025 07:00  --------------------------------------------------------  IN: 120 mL / OUT: 0 mL / NET: 120 mL        PHYSICAL EXAM:  Vital Signs Last 24 Hrs  T(C): 36.6 (08 Apr 2025 04:19), Max: 37 (07 Apr 2025 20:13)  T(F): 97.9 (08 Apr 2025 04:19), Max: 98.6 (07 Apr 2025 20:13)  HR: 93 (08 Apr 2025 04:19) (86 - 100)  BP: 89/65 (08 Apr 2025 04:19) (86/59 - 109/63)  BP(mean): --  RR: 18 (08 Apr 2025 04:19) (18 - 18)  SpO2: 97% (08 Apr 2025 04:19) (94% - 98%)    Parameters below as of 08 Apr 2025 04:19  Patient On (Oxygen Delivery Method): nasal cannula  O2 Flow (L/min): 2      CONSTITUTIONAL: NAD  HEET: MMM, EOMI, PERRLA  NECK: supple  RESPIRATORY: Normal respiratory effort; lungs are clear to auscultation bilaterally  CARDIOVASCULAR: Regular rate and rhythm, normal S1 and S2, no murmur/rub/gallop; No lower extremity edema; Peripheral pulses are 2+ bilaterally  ABDOMEN: Nontender to palpation, normoactive bowel sounds, no rebound/guarding; No hepatosplenomegaly  MUSCULOSKELETAL: no clubbing or cyanosis of digits; no joint swelling or tenderness to palpation  PSYCH: A+O to person, place, and time; affect appropriate  SKIN: No rash    LABS:                        12.5   9.39  )-----------( 440      ( 07 Apr 2025 06:17 )             40.7     04-07    137  |  100  |  11  ----------------------------<  79  4.4   |  23  |  1.08    Ca    9.0      07 Apr 2025 06:18  Phos  3.3     04-07  Mg     1.9     04-07            Urinalysis Basic - ( 07 Apr 2025 06:18 )    Color: x / Appearance: x / SG: x / pH: x  Gluc: 79 mg/dL / Ketone: x  / Bili: x / Urobili: x   Blood: x / Protein: x / Nitrite: x   Leuk Esterase: x / RBC: x / WBC x   Sq Epi: x / Non Sq Epi: x / Bacteria: x          Tele Reviewed:    RADIOLOGY & ADDITIONAL TESTS:  Results Reviewed:   Imaging Personally Reviewed:  Electrocardiogram Personally Reviewed:

## 2025-04-08 NOTE — PROGRESS NOTE ADULT - ASSESSMENT
44F w/ PMH of pHTN on Remodulin sq, sildenafil, UE DVT on Xarelto, Asthma, CHF  She is also following with transplant pulmonary and is finalizing pre-transplant evaluation.  Comes to the ED with concern with concern for abdominal abscesses. She was treated for this in the outpatient setting with doxycycline for the past 5 days without improvement.  Now being admitted for I&D and likely IV abx.  Skin lesions first started about 2 weeks ago. No fevers, chills, however local tenderness.    # pHTN  Patient has a known history of PAH on sildenfail, remodulin, diuretics. Also on O2 at home, 2-3lpm.   - continue home sildenafil 20mg TID  - continue home Remodulin 51ng/kg/min on home pump  - Please hold diuretics given hypotension and dizziness. Pt appears euvolemic.  - Await BNP, TTE, Blood cultures, CRP, ESR, Ferritin  - Please consult heart failure  - Will see if changing remodulin pump site helps improve symptoms - please ensure the site is changed promptly.  - monitor K, Na, Mg  - I&D performed on 4/3, cultures with MRSA  - in case of major surgery plans, please let pulmonary know, as patient might need adjustment to these medications       # asthma  Patient with known asthma, last received Fasenra on 4/1. PFTs from 1/2025 w/ severe obstruction, low DLCO.  - continue home equivalent pulmicort, spiriva, PRN atrovent nebs  - continue flonase   44F w/ PMH of pHTN on Remodulin sq, sildenafil, UE DVT on Xarelto, Asthma, CHF  She is also following with transplant pulmonary and is finalizing pre-transplant evaluation.  Comes to the ED with concern with concern for abdominal abscesses. She was treated for this in the outpatient setting with doxycycline for the past 5 days without improvement.  Now being admitted for I&D and likely IV abx.  Skin lesions first started about 2 weeks ago. No fevers, chills, however local tenderness.    # pHTN  Patient has a known history of PAH on sildenfail, remodulin, diuretics. Also on O2 at home, 2-3lpm.   - hold sildenafil 20mg TID  - continue home Remodulin 51ng/kg/min on home pump  - Please hold diuretics given hypotension and dizziness. Pt appears euvolemic.  - Await BNP, TTE, Blood cultures, CRP, ESR, Ferritin  - Please consult heart failure  - Will see if changing remodulin pump site helps improve symptoms - please ensure the site is changed promptly.  - monitor K, Na, Mg  - I&D performed on 4/3, cultures with MRSA  - in case of major surgery plans, please let pulmonary know, as patient might need adjustment to these medications       # asthma  Patient with known asthma, last received Fasenra on 4/1. PFTs from 1/2025 w/ severe obstruction, low DLCO.  - continue home equivalent pulmicort, spiriva, PRN atrovent nebs  - continue flonase

## 2025-04-08 NOTE — PROGRESS NOTE ADULT - ATTENDING COMMENTS
44F w/ PMH of pHTN (on Remodulin sq and sildenafil), UE DVT on Xarelto, Asthma and CHF p/w abd wall abscess s/p drainage.    Cont deny fever. Does have abdominal pain at remodulin insertion site- she thinks may be getting infected       Pt BP slightly worse this AM but now improving in afternoon and has some orthostatic sx    -cont to hold diuresis today and monitor sx  - cont hold parameters for sildenafil and cont remodulin  - f/u inflammatory markers as outlined in fellows note    - f/u echo  - will need cardiac MRI when stable  -cont NC to goal sat approx 95%  -cont ICS and BD tx (on fasenra as outpt)    d/w Phtn team  outpt f/u with Transplant Pulm and Phtn clinic 44F w/ PMH of pHTN (on Remodulin sq and sildenafil), UE DVT on Xarelto, Asthma and CHF p/w abd wall abscess s/p drainage.    Cont deny fever. Does have abdominal pain at remodulin insertion site- she thinks may be getting infected       Pt BP slightly worse this AM but now improving in afternoon and has some orthostatic sx    -cont to hold diuresis today and monitor sx  - hold  sildenafil today given BP  -  cont remodulin  - f/u inflammatory markers as outlined in fellows note    - f/u echo  - will need cardiac MRI when stable  -cont NC to goal sat approx 95%  -cont ICS and BD tx (on fasenra as outpt)    d/w Phtn team  outpt f/u with Transplant Pulm and Phtn clinic

## 2025-04-08 NOTE — PROGRESS NOTE ADULT - PROBLEM SELECTOR PLAN 2
At home takes Atrovent nebulizer, Spiriva, Pulmicort?  Pulmonary medicine is following     PLAN  - continue therapeutic interchange Atrovent nebulizer ipratropium PRN   - continue home equivalent Pulmicort, Spiriva, PRN nebs  - continue Flonase  - continue on baseline 2L O2   - continue to watch respiratory status Sent in by outside pulmonary doctor for concern that abscess was not resolving   Patient has Remodulin pump in the area of the abscess   Started two weeks ago and was started on doxycycline   Denies fevers, chills, vomiting, myalgias during this time period   Had chronic diarrhea and occasional vomiting as a known side effects of some of her medications   Patient is allergic to vancomycin   - CT abdomen pelvis Peripherally enhancing lesion in the right mid abdominal wall subjacent to diffuse skin thickening measuring 5.0 x 2.1 x 4.0 cm consistent with phlegmon/abscess  Superficial skin thickening in the left abdominal wall at the level of the umbilicus. Underlying small abscess measures 8 mm.  MRSA on wound culture     PLAN  - patient now endorsing new pain overlying the insertion site of the pump, will monitor for now pending new workup   - Linezolid 600 Q12 PO hours for   - can continue linezolid 600 q12 oral for total 10 to 14 days - PA approved, will have to resent once she is closer to dc  - If patient begins to decompensate, can switch to Daptomycin 6mg/kg  - Blood cultures x2 - NGTD  - s/p I&D - ID following

## 2025-04-08 NOTE — PROGRESS NOTE ADULT - PROBLEM SELECTOR PLAN 6
- ongoing pre-lung transplant workup  - seen by pulm transplant service     PLAN  - no inpatient recommendations for now   - home supplemental O2 2L NC, keep sats > 92%, avoid hyperoxia   - consider Cardiac MRI while inpatient as a part of ongoing transplant evaluation 2/2 to pulmonary hypertension   Seen by Dr Yoon outpatient       PLAN  - Hold home nifedipine for low BP  - holding aldactone per pulm  - holding bumex for hypotension

## 2025-04-08 NOTE — PROGRESS NOTE ADULT - PROBLEM SELECTOR PLAN 1
Sent in by outside pulmonary doctor for concern that abscess was not resolving   Patient has Remodulin pump in the area of the abscess   Started two weeks ago and was started on doxycycline   Denies fevers, chills, vomiting, myalgias during this time period   Had chronic diarrhea and occasional vomiting as a known side effects of some of her medications   Not currently meeting SIRS criteria or septic   Patient is allergic to vancomycin   - CT abdomen pelvis Peripherally enhancing lesion in the right mid abdominal wall subjacent to diffuse skin thickening measuring 5.0 x 2.1 x 4.0 cm consistent with phlegmon/abscess  Superficial skin thickening in the left abdominal wall at the level of the umbilicus. Underlying small abscess measures 8 mm.  MRSA on wound culture     PLAN  - Linezolid 600 Q12 hours for now   - can continue linezolid 600 q12 oral for total 10 to 14 days - pending PA  - If patient begins to decompensate, can switch to Daptomycin 6mg/kg  - Blood cultures x2 - NGTD  - s/p I&D - ID following Patient does run low 90s   Has been in the mid to low 80s recently   Per the patient, she does not always consistently take diuretics at home   He is prescribed Bumex and aldactone at home   Has also had headaches during these periods of hypotension     PLAN  - HF consult - known to Dr Paramjit Chavis  - will hold bumex and aldactone   - per pulmonary - Please check BNP, TTE, Blood cultures, CRP, ESR, Ferritin  - oxycodone PRN 2.5 Q6 for headache

## 2025-04-08 NOTE — PROGRESS NOTE ADULT - PROBLEM SELECTOR PROBLEM 7
Regional Procedure  Technique:  Spinal    Patient Preparation  Location: Operating Room  Preanesthetic Checklist:  Risks and Benefits Discussed, Patient &/Or Fetus Hemodynamically Stable, Post-Op Pain Mgt at Surgeon Request, Timeout Performed, Monitors and Equipment Checked and Patient Identified  Patient Position:  Sitting  Sedation:  Midazolam  2 mg     Fentanyl  100 mcg  Monitors Used:  EKG, NIBP and Pulse Oximetry  Oxygen Supplement:  Room Air  Prep:  Chlorhexidine with alcohol  Max Sterile Barrier Technique:  Hand Washing, Cap/Mask, Sterile gown, Sterile gloves and Sterile drape    Regional Basics  Local Infiltration:  2% Lidocaine  Local Infiltration Volume:  3mL  Approach:  Midline    Block Details  Technique:  Single Shot  Interspace:  L3-4  Ultrasound Used:  No  X-Ray Used:  No  Spinal Needle Type: Yves  Spinal Needle Gauge: 25  Spinal Needle Length: 3.5 in  CSF obtained.Initial Local Loading Anesthetic:  Bupivacaine  Initial Local Loading Concentration:  .5%  Initial Local Loading Volume:  2.8mL  Initial Local Narcotic Used:  None    Intrathecal Narcotics    Labor Regional Notes    Regional Anesthesia Note         Prophylactic measure Pre-transplant evaluation for lung transplant

## 2025-04-08 NOTE — PROGRESS NOTE ADULT - PROBLEM SELECTOR PLAN 5
2/2 to pulmonary hypertension   Seen by Dr Yoon outpatient       PLAN  - Hold home nifedipine for low BP  - continue aldactone per pulm  - holding bumex for hypotension On Xarelto 15 outpatient for previus DVT     PLAN  - restarting per surgery

## 2025-04-08 NOTE — PROGRESS NOTE ADULT - PROBLEM SELECTOR PLAN 8
No BM since admission   Has been using opiods for headache management     PLAN  - continue Miralax BID and apryl

## 2025-04-09 ENCOUNTER — RESULT REVIEW (OUTPATIENT)
Age: 45
End: 2025-04-09

## 2025-04-09 DIAGNOSIS — H81.10 BENIGN PAROXYSMAL VERTIGO, UNSPECIFIED EAR: ICD-10-CM

## 2025-04-09 PROCEDURE — 99234 HOSP IP/OBS SM DT SF/LOW 45: CPT

## 2025-04-09 PROCEDURE — 93306 TTE W/DOPPLER COMPLETE: CPT | Mod: 26

## 2025-04-09 PROCEDURE — 99233 SBSQ HOSP IP/OBS HIGH 50: CPT | Mod: GC

## 2025-04-09 RX ORDER — MIDODRINE HYDROCHLORIDE 5 MG/1
10 TABLET ORAL THREE TIMES A DAY
Refills: 0 | Status: DISCONTINUED | OUTPATIENT
Start: 2025-04-09 | End: 2025-04-10

## 2025-04-09 RX ORDER — LORAZEPAM 4 MG/ML
0.5 VIAL (ML) INJECTION ONCE
Refills: 0 | Status: DISCONTINUED | OUTPATIENT
Start: 2025-04-09 | End: 2025-04-09

## 2025-04-09 RX ORDER — MECLIZINE HCL 12.5 MG
12.5 TABLET ORAL ONCE
Refills: 0 | Status: COMPLETED | OUTPATIENT
Start: 2025-04-09 | End: 2025-04-09

## 2025-04-09 RX ADMIN — MONTELUKAST SODIUM 10 MILLIGRAM(S): 10 TABLET ORAL at 11:07

## 2025-04-09 RX ADMIN — OXYCODONE HYDROCHLORIDE 2.5 MILLIGRAM(S): 30 TABLET ORAL at 21:44

## 2025-04-09 RX ADMIN — OXYCODONE HYDROCHLORIDE 2.5 MILLIGRAM(S): 30 TABLET ORAL at 08:12

## 2025-04-09 RX ADMIN — Medication 5 MILLIGRAM(S): at 22:59

## 2025-04-09 RX ADMIN — Medication 40 MILLIGRAM(S): at 05:41

## 2025-04-09 RX ADMIN — LINEZOLID 600 MILLIGRAM(S): 2 INJECTION, SOLUTION INTRAVENOUS at 05:41

## 2025-04-09 RX ADMIN — Medication 12.5 MILLIGRAM(S): at 11:07

## 2025-04-09 RX ADMIN — RIVAROXABAN 15 MILLIGRAM(S): 10 TABLET, FILM COATED ORAL at 17:38

## 2025-04-09 RX ADMIN — Medication 1 APPLICATION(S): at 11:08

## 2025-04-09 RX ADMIN — MIDODRINE HYDROCHLORIDE 10 MILLIGRAM(S): 5 TABLET ORAL at 17:38

## 2025-04-09 RX ADMIN — Medication 0.5 MILLIGRAM(S): at 12:52

## 2025-04-09 RX ADMIN — OXYCODONE HYDROCHLORIDE 2.5 MILLIGRAM(S): 30 TABLET ORAL at 08:45

## 2025-04-09 RX ADMIN — OXYCODONE HYDROCHLORIDE 2.5 MILLIGRAM(S): 30 TABLET ORAL at 20:44

## 2025-04-09 RX ADMIN — LINEZOLID 600 MILLIGRAM(S): 2 INJECTION, SOLUTION INTRAVENOUS at 17:38

## 2025-04-09 NOTE — CHART NOTE - NSCHARTNOTEFT_GEN_A_CORE
44y F , with PMH of pHTN (on treprostinil infusion still continuing ), Right   Heart failure(s/p PPM dual chamber ), chronic PE (dx ) on DOAC, SVT (s/p ablation 2020), asthma, h/o MRSA abdominal wall abscesses last , and JUILA. was admitted after being sent in from outpatient pulmonologist. for concern of worsening abdominal abscess in proximity to medication pump that has been unresponsive to doxycyline. Per patient she started having abdominal pain and increased swelling two weeks ago. Went into her outpatient transplant ID doctor Dr Ash and was given a course of doxycyline yesterday she presented to her pulmonologist DR Yoon who was concerned about the size of the abscess and that it was not improving. She was sent in for further evaluation Patient denied cough, fevers, myalgias vomiting ot diarrhea during the time period of the abscess formations     ED course   Tmax 97.7, , /56 on 2L home   Not meeting SIRS criteria   Linezolid and Cefepime, Ofirmev, Morphine   (2025 16:47)      Pt recently on transplant waiting list - has documented pcn allergy from 2019 outpatient chart as "rash". 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 Reports hives from adhesives at contact sites.  Spoke w/patient over phone as well as  for collateral - reaction has occurred more than 5 years ago. Did not occur in outpatient setting; states that her reactions were at another hospital. Denies any lip/throat swelling or airway occlusion requiring advanced treatment with amoxicillin or penicillins.  Hx of asthma now on montelukast. No reported hx nasal polyps, anosmia, NSAID hypersensitivity, low concern AERD.    Based on answers, pt would be low risk for pcn allergy challenge, however  requests further hx to be obtained. Case reviewed with Dr. Starkey - given need for amox ppx or rx i/s/o post-transplant care, will further clarify hx w/pt tomorrow and can potentially proceed with oral amox challenge inpatient if no further hx concerning.    Please message on MS Teams with any questions or concerns.   If after 5PM please page fellow on call.     Keith Fajardo MD  Fellow, Division of Allergy/Immunology  [phone]

## 2025-04-09 NOTE — PROVIDER CONTACT NOTE (OTHER) - SITUATION
Pt verbalized headache, nausea, room spinning
BP 88/62, pt is due for dose of PO sildenafil 20mg.
BP 88/ 62 manual

## 2025-04-09 NOTE — PROGRESS NOTE ADULT - PROBLEM SELECTOR PLAN 4
Seen outpatient by Dr Yoon who sent into the hospital   Has Remodulin pump near sight of infection  Also take sildenafil     PLAN  - Pulmonary following   - per pulm - Please check BNP, TTE, Blood cultures, CRP, ESR, Ferritin  - -continue home sildenafil 20mg TID  - continue home Remodulin 51ng/kg/min on home pump  - HOLDING home aldactone  - HOLDING bumex for hypotension and dizziness At home takes Atrovent nebulizer, Spiriva, Pulmicort?  Pulmonary medicine is following     PLAN  - continue therapeutic interchange Atrovent nebulizer ipratropium PRN   - continue home equivalent Pulmicort, Spiriva, PRN nebs  - continue Flonase  - continue on baseline 2L O2   - continue to watch respiratory status

## 2025-04-09 NOTE — CONSULT NOTE ADULT - SUBJECTIVE AND OBJECTIVE BOX
History:  ROXI MARIA is a 44year old Woman with PMHx pulmonary hypertension, right sided hear failure with dual chamber PPM (2016), chronic PE (dx 2017), on rivaroxaban, asthma, hx MRSA abdominal wall abscess feb /25 presented 4/2/25 for abdominl abscess, new hypotension iso right heart failure ongoing transplant eval. This hospitalization she has had hypotension to low 80s (starting midodrine 4/9)    Neurology consulted 4/9/25 for headaches and vertigo.    She has chronic headaches for last 2 years. Headaches were holocephalic, perhaps worse posteriorly, throbbing, a/w nausea + photophobia, up to 10/10 in severity and occured ~weekly. This hospitalization she has similar quality in headaches up to 10/10 near constantly but improved with medications. No headache at time of encounter. Here she is being treated with opioids and acetaminophen for these headaches    Throughout hospitalization, she notes sensation of light headed a/w room spinning. These are intermittent and worse with movement especially on standing. Improved with meclizine 12.5mg x 1 today.     No diplopia, no worsening of chronic blurry vision, no tinnitus, no pulsatile tinnitus, no ear pain/pressure, no facial numbness/weakness, no weakness/numbness, no language difficulty or voice changes, no confusion.  No falls. No clumsiness.     PMHx:  Asthma  Pulmonary hypertension  Tachycardia  Anemia  Pulmonary hypertension  Pulmonary embolism  Asthma  Sinus tachycardia  Asthma with status asthmaticus, unspecified asthma severity  PE (pulmonary thromboembolism)  Keratoconus  Sinusitis  Corneal disorder  Right heart failure  CAD (coronary artery disease)  H/O pulmonary hypertension  Pulmonary embolis  Asthma  History of tachycardia  On supplemental oxygen by nasal cannula  Pacemaker  Skin mass  Right atrial thrombus  Hypotension    Social History:  Denies ETOH  Denies tobacco use  Denies IVDu use (02 Apr 2025 16:47)    Medications  Home Medications:  Atrovent 18 mcg/inh inhalation aerosol: 1 inhaled 4 times a day as needed for  shortness of breath and/or wheezing (02 Apr 2025 16:24)  omeprazole 40 mg oral delayed release capsule: 1 cap(s) orally once a day (02 Apr 2025 16:24)  Remodulin 5 mg/mL injectable solution: 1 application injectable once a day via her own SQ PUMP (02 Apr 2025 16:24)  sildenafil 20 mg oral tablet: 1 tab(s) orally every 8 hours (02 Apr 2025 16:24)  Xarelto 15 mg oral tablet: 1 tab(s) orally once a day resume tomorrow 2/6 (02 Apr 2025 16:24)    MEDICATIONS  (STANDING):  acetaminophen   IVPB .. 1000 milliGRAM(s) IV Intermittent once  buDESOnide    Inhalation Suspension 0.5 milliGRAM(s) Inhalation daily  chlorhexidine 2% Cloths 1 Application(s) Topical daily  fluticasone propionate 50 MICROgram(s)/spray Nasal Spray 1 Spray(s) Both Nostrils two times a day  influenza   Vaccine 0.5 milliLiter(s) IntraMuscular once  lidocaine   4% Patch 1 Patch Transdermal daily  lidocaine 1%/epinephrine 1:100,000 Inj 20 milliLiter(s) Local Injection once  linezolid    Tablet 600 milliGRAM(s) Oral every 12 hours  midodrine. 10 milliGRAM(s) Oral three times a day  montelukast 10 milliGRAM(s) Oral daily  pantoprazole    Tablet 40 milliGRAM(s) Oral before breakfast  polyethylene glycol 3350 17 Gram(s) Oral two times a day  remodulin (treprostinil) SubQ infusion 1 Dose(s) 1 Dose(s) SubCutaneous Continuous Pump  rivaroxaban 15 milliGRAM(s) Oral with dinner  senna 2 Tablet(s) Oral at bedtime    MEDICATIONS  (PRN):  acetaminophen     Tablet .. 1000 milliGRAM(s) Oral every 6 hours PRN Mild Pain (1 - 3), Moderate Pain (4 - 6)  albuterol    90 MICROgram(s) HFA Inhaler 2 Puff(s) Inhalation every 6 hours PRN Shortness of Breath  albuterol/ipratropium for Nebulization 3 milliLiter(s) Nebulizer every 6 hours PRN Shortness of Breath  melatonin 5 milliGRAM(s) Oral at bedtime PRN Insomnia  oxyCODONE    IR 2.5 milliGRAM(s) Oral every 6 hours PRN Severe Pain (7 - 10)      Exam:  Vitals:  T(C): 36.7 (04-09-25 @ 10:30), Max: 36.7 (04-08-25 @ 21:35)  T(F): 98.1 (04-09-25 @ 10:30), Max: 98.1 (04-09-25 @ 10:30)  HR: 96 (04-09-25 @ 10:30) (87 - 96)  BP: 105/69 (04-09-25 @ 10:30) (90/64 - 105/69)  RR: 18 (04-09-25 @ 10:30) (18 - 18)  SpO2: 97% (04-09-25 @ 10:30) (96% - 99%)  I&O's Summary    08 Apr 2025 07:01  -  09 Apr 2025 07:00  --------------------------------------------------------  IN: 1100 mL / OUT: 0 mL / NET: 1100 mL      Physical and Neurological Examination:   - General: supine in bed on NC    - Mental Status:   level of consciousness: Awake, alert  Orientation: Oriented to person, age, place, and time  Language: Speech is fluent with intact naming, repetition, and ability to follow commands (including simple commands and complex cross commands)  Cortical Signs:  no hemineglect.   Content: Good overall fund of knowledge (aware of current events,and relevant past history)    HINTS+  HI: left corrective saccade on right head impulse  N: only end gaze fatigable nystagmus  TS: negative  Hearing intact and  symmetric to finger rub b/l as above    - Cranial Nerves:   PERRL, VFF, EOMI, facial sensation is intact in the V1-V3 distribution bilaterally; face is symmetric with augusto smile; hearing is intact to finger rub bilaterally and equally; uvula is midline and soft palate rises symmetrically; shoulder shrug intact; tongue protrudes in the midline with intact lateral movements    - Motor Testing:  Bulk: Normal   Tone: Normal  Strength:  There is no pronator drift b/l  There is no leg drift b/l  NO orbiting  intact rapid finger rapping                              Right           Left  Should-Abduct      5                   5   -some pain limitations  Elbow-Flex             5                   5  Elbow-Ext              5                   5                        5                   5    Hip-Flex                 5                   5  Knee-Flex              5                   5  Knee-Ext                5                   5  Dorsiflex                5                   5  Plantarflex             5                   5    - Reflexes:              Right           Left  Triceps                     2+                2+  Biceps                      2+                 2+  Brachioradialis         2+                2+  Patellar                    2+                 2+    requires distraction to elicit all reflexes    - Sensory: Intact throughout to light touch.   - Coordination: Finger-nose-finger intact without dysmetria.   ROmberg negative   - Gait: Normal steps, base, arm swing, and turning.  Able to tandem walk. Able to heel and toe walk.    Objective Studies  Labs:                          12.5   7.71  )-----------( 400      ( 08 Apr 2025 10:36 )             39.3       04-08    136  |  99  |  9   ----------------------------<  68[L]  4.5   |  23  |  0.98    Ca    9.1      08 Apr 2025 10:36  Phos  3.0     04-08  Mg     1.9     04-08            Urinalysis Basic - ( 08 Apr 2025 10:36 )    Color: x / Appearance: x / SG: x / pH: x  Gluc: 68 mg/dL / Ketone: x  / Bili: x / Urobili: x   Blood: x / Protein: x / Nitrite: x   Leuk Esterase: x / RBC: x / WBC x   Sq Epi: x / Non Sq Epi: x / Bacteria: x            Lactate Trend            CAPILLARY BLOOD GLUCOSE            Culture Results:   Moderate Methicillin Resistant Staphylococcus aureus (04-03 @ 14:15)  Culture Results:   No growth at 5 days (04-02 @ 12:30)  Culture Results:   No growth at 5 days (04-02 @ 12:15)        CSF:                CNS Imaging:  CT Head No Cont:  (07 Apr 2025 10:22)        < from: CT Head No Cont (04.07.25 @ 10:22) >    ACC: [id number] EXAM:  CT BRAIN   ORDERED BY:  SERINA BOWEN     PROCEDURE DATE:  04/07/2025          INTERPRETATION:  CLINICAL INFORMATION: headaches    COMPARISON: CT head 10/23/2024    CONTRAST:  IV Contrast: NONE  .    TECHNIQUE:  Serial axial images were obtained from the skull base to the   vertex using multi-slice helical technique. Sagittal and coronal   reformats were obtained.    FINDINGS:    VENTRICLES AND SULCI: Normal in size and configuration.  INTRA-AXIAL: No mass effect, acute hemorrhage, or midline shift.  EXTRA-AXIAL: No mass or fluid collection. Basal cisterns are normal in   appearance.    VISUALIZED SINUSES:  Clear.  TYMPANOMASTOID CAVITIES:  Clear.  VISUALIZED ORBITS: Normal.  CALVARIUM: Intact.    MISCELLANEOUS: None.      IMPRESSION:  No acute intracranial hemorrhage, mass effect, or midline shift.        --- End of Report ---            AAKASH SIMON MD; Attending Radiologist  This document has been electronically signed. Apr 7 2025 11:05AM    < end of copied text >

## 2025-04-09 NOTE — CONSULT NOTE ADULT - SUBJECTIVE AND OBJECTIVE BOX
CC: vertigo     HPI:  45 y/o, F, with PMHx of pHTN, right Heart failure(s/p PPM dual chamber 2016), chronic PE (dx 2017) on DOAC, SVT (s/p ablation 05/2020), asthma, h/o MRSA abdominal wall abscesses last 2/'25, and JULIA, was sent in from outpatient pulmonologist for concern of worsening abdominal abscess, with new hypotension 2/2 right HF (with ongoing heart transplant eval). ENT consulted for dizziness and vertigo. per patient, when she had the dizzy spells, she experienced room spinning sensation. Symptoms mostly triggered by getting up from sitting or sleeping positions, or sudden head turning. The vertigo episodes lasted from seconds to minutes, sometime felt longer, but not days to weeks. Denies hearing loss, but at times with pulsating sensation on the ear drum.  Currently, pt denies SOB, rhinorrhea, odynophagia, dysphonia, changes in voice or inability to tolerate secretions.    PAST MEDICAL & SURGICAL HISTORY:  Anemia  Pulmonary hypertension  Asthma  On home O2 nightly at 2L via NC  PE (pulmonary thromboembolism)  on Xarelto 10 mg daily  Sinusitis  Right heart failure  H/O pulmonary hypertension  Pulmonary embolism  History of tachycardia  On supplemental oxygen by nasal cannula  O2 @ 2L  Pacemaker  Right atrial thrombus  Hypotension  Pacemaker  History of pacemaker  12/19 - [employer] model Ingevity 4469    Allergies  vancomycin (Rash)  penicillin (Rash)  adhesives (Rash)    Intolerances    MEDICATIONS  (STANDING):  acetaminophen   IVPB .. 1000 milliGRAM(s) IV Intermittent once  buDESOnide    Inhalation Suspension 0.5 milliGRAM(s) Inhalation daily  chlorhexidine 2% Cloths 1 Application(s) Topical daily  fluticasone propionate 50 MICROgram(s)/spray Nasal Spray 1 Spray(s) Both Nostrils two times a day  influenza   Vaccine 0.5 milliLiter(s) IntraMuscular once  lidocaine   4% Patch 1 Patch Transdermal daily  lidocaine 1%/epinephrine 1:100,000 Inj 20 milliLiter(s) Local Injection once  linezolid    Tablet 600 milliGRAM(s) Oral every 12 hours  midodrine. 10 milliGRAM(s) Oral three times a day  montelukast 10 milliGRAM(s) Oral daily  pantoprazole    Tablet 40 milliGRAM(s) Oral before breakfast  polyethylene glycol 3350 17 Gram(s) Oral two times a day  remodulin (treprostinil) SubQ infusion 1 Dose(s) 1 Dose(s) SubCutaneous Continuous Pump  rivaroxaban 15 milliGRAM(s) Oral with dinner  senna 2 Tablet(s) Oral at bedtime    MEDICATIONS  (PRN):  acetaminophen     Tablet .. 1000 milliGRAM(s) Oral every 6 hours PRN Mild Pain (1 - 3), Moderate Pain (4 - 6)  albuterol    90 MICROgram(s) HFA Inhaler 2 Puff(s) Inhalation every 6 hours PRN Shortness of Breath  albuterol/ipratropium for Nebulization 3 milliLiter(s) Nebulizer every 6 hours PRN Shortness of Breath  melatonin 5 milliGRAM(s) Oral at bedtime PRN Insomnia  oxyCODONE    IR 2.5 milliGRAM(s) Oral every 6 hours PRN Severe Pain (7 - 10)    Social History: denies smoking or ETOH usage   Family history: no pertinent family history     ROS:   ENT: all negative except as noted in HPI   CV: denies palpitations  Pulm: denies SOB, cough, hemoptysis  GI: endorses poor appetite   : denies pertinent urinary symptoms, urgency  Neuro: denies numbness/tingling, loss of sensation  Psych: denies anxiety  MS: endorses instability when vertigo happens   Heme: denies easy bruising or bleeding  Endo: denies heat/cold intolerance, excessive sweating    Vital Signs Last 24 Hrs  T(C): 36.6 (09 Apr 2025 20:30), Max: 36.9 (09 Apr 2025 16:56)  T(F): 97.9 (09 Apr 2025 20:30), Max: 98.5 (09 Apr 2025 16:56)  HR: 85 (09 Apr 2025 20:30) (85 - 96)  BP: 91/62 (09 Apr 2025 20:30) (90/64 - 105/69)  BP(mean): --  RR: 18 (09 Apr 2025 20:30) (18 - 18)  SpO2: 99% (09 Apr 2025 20:30) (97% - 100%)    Parameters below as of 09 Apr 2025 20:30  Patient On (Oxygen Delivery Method): nasal cannula  O2 Flow (L/min): 2                     12.5   7.71  )-----------( 400      ( 08 Apr 2025 10:36 )             39.3    04-08    136  |  99  |  9   ----------------------------<  68[L]  4.5   |  23  |  0.98    Ca    9.1      08 Apr 2025 10:36  Phos  3.0     04-08  Mg     1.9     04-08      PHYSICAL EXAM:  Gen: NAD  Skin: No rashes, bruises, or lesions  Head: Normocephalic, Atraumatic  Face: no edema, erythema, or fluctuance. Parotid glands soft without mass  Eyes: no scleral injection  Ears: Right: ear canal clear, TM intact without effusion or erythema. No evidence of any fluid drainage. No mastoid tenderness, erythema, or ear bulging            Left: ear canal clear, TM intact without effusion or erythema. No evidence of any fluid drainage. No mastoid tenderness, erythema, or ear bulging  Nose: Nares bilaterally patent, no discharge  Mouth: No Stridor / Drooling / Trismus.  Mucosa moist, tongue/uvula midline, oropharynx clear  Neck: Flat, supple, no lymphadenopathy, trachea midline, no masses  Lymphatic: No lymphadenopathy  Resp: breathing comfortably at room air, no stridor  CV: no peripheral edema/cyanosis  GI: nondistended   Neuro: facial nerve intact, no facial droop

## 2025-04-09 NOTE — PROGRESS NOTE ADULT - ASSESSMENT
44y F, with PMH of pHTN (on treprostinil infusion still continuing ), Right  Heart failure(s/p PPM dual chamber ), chronic PE (dx ) on DOAC, SVT (s/p ablation 2020), asthma, h/o MRSA abdominal wall abscesses last , and JULIA presenting for treatment of new abdominal abscess s/p I&D drainage on abx treatment wound culture MRSA and pending dispo with oral linezolid now with new hypotension iso of R sided heart failure and diuretic use  44y F, with PMH of pHTN (on treprostinil infusion still continuing ), Right  Heart failure(s/p PPM dual chamber ), chronic PE (dx ) on DOAC, SVT (s/p ablation 2020), asthma, h/o MRSA abdominal wall abscesses last , and JULIA presenting for treatment of new abdominal abscess s/p I&D drainage on abx treatment wound culture MRSA and pending dispo with oral linezolid now with new hypotension iso RHF now pending ongoing transplant evaluation

## 2025-04-09 NOTE — PROGRESS NOTE ADULT - SUBJECTIVE AND OBJECTIVE BOX
Pulmonary Consult Follow up     Interval Events:    Having room spinning dizziness this morning. Difficulty with getting out of bed due to dizziness.    REVIEW OF SYSTEMS:  [x] All other systems negative except per HPI   [ ] Unable to assess ROS because ________    OBJECTIVE:  ICU Vital Signs Last 24 Hrs  T(C): 36.9 (09 Apr 2025 16:56), Max: 36.9 (09 Apr 2025 16:56)  T(F): 98.5 (09 Apr 2025 16:56), Max: 98.5 (09 Apr 2025 16:56)  HR: 88 (09 Apr 2025 16:56) (87 - 96)  BP: 101/70 (09 Apr 2025 16:56) (90/64 - 105/69)  BP(mean): --  ABP: --  ABP(mean): --  RR: 18 (09 Apr 2025 16:56) (18 - 18)  SpO2: 100% (09 Apr 2025 16:56) (96% - 100%)    O2 Parameters below as of 09 Apr 2025 16:56  Patient On (Oxygen Delivery Method): nasal cannula  O2 Flow (L/min): 2            04-08 @ 07:01  -  04-09 @ 07:00  --------------------------------------------------------  IN: 1100 mL / OUT: 0 mL / NET: 1100 mL        PHYSICAL EXAM:  GENERAL: NAD, well-groomed, well-developed  HEAD:  Atraumatic, Normocephalic  EYES: EOMI, conjunctiva and sclera clear  ENMT: Moist mucous membranes  CHEST/LUNG: Clear to auscultation bilaterally; No rales, rhonchi, wheezing, or rubs  HEART: Regular rate and rhythm; No murmurs, rubs, or gallops  ABDOMEN: Nondistended. no tenderness today.   VASCULAR: No  cyanosis, or edema  SKIN: No rashes or lesions  NERVOUS SYSTEM:  Alert & Oriented X3, Good concentration    HOSPITAL MEDICATIONS:  MEDICATIONS  (STANDING):  acetaminophen   IVPB .. 1000 milliGRAM(s) IV Intermittent once  buDESOnide    Inhalation Suspension 0.5 milliGRAM(s) Inhalation daily  chlorhexidine 2% Cloths 1 Application(s) Topical daily  fluticasone propionate 50 MICROgram(s)/spray Nasal Spray 1 Spray(s) Both Nostrils two times a day  influenza   Vaccine 0.5 milliLiter(s) IntraMuscular once  lidocaine   4% Patch 1 Patch Transdermal daily  lidocaine 1%/epinephrine 1:100,000 Inj 20 milliLiter(s) Local Injection once  linezolid    Tablet 600 milliGRAM(s) Oral every 12 hours  midodrine. 10 milliGRAM(s) Oral three times a day  montelukast 10 milliGRAM(s) Oral daily  pantoprazole    Tablet 40 milliGRAM(s) Oral before breakfast  polyethylene glycol 3350 17 Gram(s) Oral two times a day  remodulin (treprostinil) SubQ infusion 1 Dose(s) 1 Dose(s) SubCutaneous Continuous Pump  rivaroxaban 15 milliGRAM(s) Oral with dinner  senna 2 Tablet(s) Oral at bedtime    MEDICATIONS  (PRN):  acetaminophen     Tablet .. 1000 milliGRAM(s) Oral every 6 hours PRN Mild Pain (1 - 3), Moderate Pain (4 - 6)  albuterol    90 MICROgram(s) HFA Inhaler 2 Puff(s) Inhalation every 6 hours PRN Shortness of Breath  albuterol/ipratropium for Nebulization 3 milliLiter(s) Nebulizer every 6 hours PRN Shortness of Breath  melatonin 5 milliGRAM(s) Oral at bedtime PRN Insomnia  oxyCODONE    IR 2.5 milliGRAM(s) Oral every 6 hours PRN Severe Pain (7 - 10)      LABS:  04-08    136  |  99  |  9   ----------------------------<  68[L]  4.5   |  23  |  0.98  04-07    137  |  100  |  11  ----------------------------<  79  4.4   |  23  |  1.08    Ca    9.1      08 Apr 2025 10:36  Ca    9.0      07 Apr 2025 06:18  Phos  3.0     04-08  Mg     1.9     04-08      Magnesium: 1.9 mg/dL (04-08-25 @ 10:36)  Magnesium: 1.9 mg/dL (04-07-25 @ 06:18)    Phosphorus: 3.0 mg/dL (04-08-25 @ 10:36)  Phosphorus: 3.3 mg/dL (04-07-25 @ 06:18)                    Urinalysis Basic - ( 08 Apr 2025 10:36 )    Color: x / Appearance: x / SG: x / pH: x  Gluc: 68 mg/dL / Ketone: x  / Bili: x / Urobili: x   Blood: x / Protein: x / Nitrite: x   Leuk Esterase: x / RBC: x / WBC x   Sq Epi: x / Non Sq Epi: x / Bacteria: x                              12.5   7.71  )-----------( 400      ( 08 Apr 2025 10:36 )             39.3                         12.5   9.39  )-----------( 440      ( 07 Apr 2025 06:17 )             40.7     CAPILLARY BLOOD GLUCOSE

## 2025-04-09 NOTE — PROGRESS NOTE ADULT - PROBLEM SELECTOR PLAN 5
On Xarelto 15 outpatient for previus DVT     PLAN  - restarting per surgery Seen outpatient by Dr Yoon who sent into the hospital   Has Remodulin pump near sight of infection    PLAN  - Pulmonary following   - per pulm - Please check BNP, TTE, Blood cultures, CRP, ESR, Ferritin  - -continue home sildenafil 20mg TID  - continue home Remodulin 51ng/kg/min on home pump  - HOLDING home aldactone  - HOLDING bumex for hypotension and dizziness  - HOLDING Sildenaphil

## 2025-04-09 NOTE — CONSULT NOTE ADULT - ASSESSMENT
45 y/o, F, with PMHx of pHTN, right Heart failure(s/p PPM dual chamber 2016), chronic PE (dx 2017) on DOAC, SVT (s/p ablation 05/2020), asthma, h/o MRSA abdominal wall abscesses last 2/'25, and JULIA, was sent in from outpatient pulmonologist for concern of worsening abdominal abscess, with new hypotension 2/2 right HF (with ongoing heart transplant eval). ENT consulted for dizziness and vertigo. per patient, when she had the dizzy spells, she experienced room spinning sensation. Symptoms mostly triggered by getting up from sitting or sleeping positions, or sudden head turning. The vertigo episodes lasted from seconds to minutes, sometime felt longer, but not days to weeks. Denies hearing loss, but at times with pulsating sensation on the ear drum.  Ear exams are unremarkable b/l. [place] hallpike maneuver negative. findings are c/w BPPV

## 2025-04-09 NOTE — PROGRESS NOTE ADULT - ATTENDING COMMENTS
Abdominal wall abscess s/p I&D- MRSA on PO Linezolid  Chronic hypoxic respiratory failure on home O2 due to pHTN with right sided heart failure- pulmonary following- continue Remodulin  Chronic HAs/dizziness- CT head unremarkable  Hypotension- due to over diuresis- improved with holding diuretics and sildenafil- TTE with EF 70%, severely enlarged RV and mild pHTN- pending cMRI  Asthma- continue Pulmicort, Singulair, Duonebs PRN, Flonase  DVT history- continue Xarelto  D/C home pending cMRI and continued mprovement in BP- PA for linezolid obtained

## 2025-04-09 NOTE — PROGRESS NOTE ADULT - ASSESSMENT
44F w/ PMH of pHTN on Remodulin sq, sildenafil, UE DVT on Xarelto, Asthma, CHF  She is also following with transplant pulmonary and is finalizing pre-transplant evaluation.  Comes to the ED with concern with concern for abdominal abscesses. She was treated for this in the outpatient setting with doxycycline for the past 5 days without improvement.  Now being admitted for I&D and likely IV abx.  Skin lesions first started about 2 weeks ago. No fevers, chills, however local tenderness.    # pHTN  Patient has a known history of PAH on sildenfail, remodulin, diuretics. Also on O2 at home, 2-3lpm.   - hold sildenafil 20mg TID  - continue home Remodulin 51ng/kg/min on home pump  - monitor K, Na, Mg  - I&D performed on 4/3, cultures with MRSA  - in case of major surgery plans, please let pulmonary know, as patient might need adjustment to these medications  - Please hold diuretics given hypotension and dizziness. Pt appears euvolemic.  - Please hold sildenafil for now  - Please get neurology and ent eval for her dizziness with +nystagmus  - Unable to get MRI due to remodulin pump - do not remove or stop pump   - Will pursue MYRIAM and MUGA scan when she is clinically improved.    # asthma  Patient with known asthma, last received Fasenra on 4/1. PFTs from 1/2025 w/ severe obstruction, low DLCO.  - continue home equivalent pulmicort, spiriva, PRN atrovent nebs  - continue flonase

## 2025-04-09 NOTE — PROGRESS NOTE ADULT - PROBLEM SELECTOR PLAN 1
Patient does run low 90s   Has been in the mid to low 80s recently   Per the patient, she does not always consistently take diuretics at home   He is prescribed Bumex and aldactone at home   Has also had headaches during these periods of hypotension     PLAN  - HF consult - known to Dr Paramjit Chavis  - will hold bumex and aldactone   - per pulmonary - Please check BNP, TTE, Blood cultures, CRP, ESR, Ferritin  - oxycodone PRN 2.5 Q6 for headache - ongoing pre-lung transplant workup  - seen by pulm transplant service   - Patient is now being considered for transplant inpatient per Dr Wise    PLAN  - Allergy consult for penicillin desensitization for post transplant treatment \  - needs cardiac MRI but needs to be transitioned to an MRI compatible pump before that can happen   - home supplemental O2 2L NC, keep sats > 92%, avoid hyperoxia

## 2025-04-09 NOTE — CONSULT NOTE ADULT - PROBLEM SELECTOR RECOMMENDATION 9
- symptomatic controll with anti-emetics  - please check orthostatic vitals.  - rec vestibular rehab as outpatient.   - fall precaution  - avoid rapid head movements during recovery  - outpatient ENT followup.   - Fall precaution   - Please follow up outpatient with [facility] clinic by calling [phone]. Add: [address] , [place] [address]
s/p I&D 4/3  appreciate ID input  c/w linezolid

## 2025-04-09 NOTE — CONSULT NOTE ADULT - TIME BILLING
Personal review of data, imaging and discussion with medical team.
chart and data review, clinical assessment, and coordination of care. This excludes any time spent on separate procedures or teaching.
-review of the history and records  -general and neurological examination  -generation of assessment and treatment plan  -communication with the patient/family members  -coordination of care.
- Review of chart and consultation notes  - Exam and discussion with patient  - Review of laboratory and imaging   - Establishing a care plan for patient  - Communication with other providers

## 2025-04-09 NOTE — PROGRESS NOTE ADULT - SUBJECTIVE AND OBJECTIVE BOX
PROGRESS NOTE:   Authored by Dr. Rivera Carter MD     Patient is a 44y old  Female who presents with a chief complaint of abscess (08 Apr 2025 21:43)      SUBJECTIVE / OVERNIGHT EVENTS:  No acute events overnight.     MEDICATIONS  (STANDING):  acetaminophen   IVPB .. 1000 milliGRAM(s) IV Intermittent once  buDESOnide    Inhalation Suspension 0.5 milliGRAM(s) Inhalation daily  chlorhexidine 2% Cloths 1 Application(s) Topical daily  fluticasone propionate 50 MICROgram(s)/spray Nasal Spray 1 Spray(s) Both Nostrils two times a day  influenza   Vaccine 0.5 milliLiter(s) IntraMuscular once  lidocaine   4% Patch 1 Patch Transdermal daily  lidocaine 1%/epinephrine 1:100,000 Inj 20 milliLiter(s) Local Injection once  linezolid    Tablet 600 milliGRAM(s) Oral every 12 hours  LORazepam   Injectable 0.5 milliGRAM(s) IV Push once  montelukast 10 milliGRAM(s) Oral daily  pantoprazole    Tablet 40 milliGRAM(s) Oral before breakfast  polyethylene glycol 3350 17 Gram(s) Oral two times a day  remodulin (treprostinil) SubQ infusion 1 Dose(s) 1 Dose(s) SubCutaneous Continuous Pump  rivaroxaban 15 milliGRAM(s) Oral with dinner  senna 2 Tablet(s) Oral at bedtime    MEDICATIONS  (PRN):  acetaminophen     Tablet .. 1000 milliGRAM(s) Oral every 6 hours PRN Mild Pain (1 - 3), Moderate Pain (4 - 6)  albuterol    90 MICROgram(s) HFA Inhaler 2 Puff(s) Inhalation every 6 hours PRN Shortness of Breath  albuterol/ipratropium for Nebulization 3 milliLiter(s) Nebulizer every 6 hours PRN Shortness of Breath  melatonin 5 milliGRAM(s) Oral at bedtime PRN Insomnia  oxyCODONE    IR 2.5 milliGRAM(s) Oral every 6 hours PRN Severe Pain (7 - 10)      CAPILLARY BLOOD GLUCOSE        I&O's Summary    08 Apr 2025 07:01  -  09 Apr 2025 07:00  --------------------------------------------------------  IN: 1100 mL / OUT: 0 mL / NET: 1100 mL        PHYSICAL EXAM:  Vital Signs Last 24 Hrs  T(C): 36.6 (09 Apr 2025 04:38), Max: 36.7 (08 Apr 2025 09:00)  T(F): 97.9 (09 Apr 2025 04:38), Max: 98.1 (08 Apr 2025 09:00)  HR: 92 (09 Apr 2025 04:38) (87 - 105)  BP: 90/64 (09 Apr 2025 04:38) (83/61 - 103/74)  BP(mean): --  RR: 18 (09 Apr 2025 04:38) (18 - 18)  SpO2: 99% (09 Apr 2025 04:38) (94% - 99%)    Parameters below as of 08 Apr 2025 21:35  Patient On (Oxygen Delivery Method): nasal cannula  O2 Flow (L/min): 2      CONSTITUTIONAL: NAD  HEET: MMM, EOMI, PERRLA  NECK: supple  RESPIRATORY: Normal respiratory effort; lungs are clear to auscultation bilaterally  CARDIOVASCULAR: Regular rate and rhythm, normal S1 and S2, no murmur/rub/gallop; No lower extremity edema; Peripheral pulses are 2+ bilaterally  ABDOMEN: Nontender to palpation, normoactive bowel sounds, no rebound/guarding; No hepatosplenomegaly  MUSCULOSKELETAL: no clubbing or cyanosis of digits; no joint swelling or tenderness to palpation  PSYCH: A+O to person, place, and time; affect appropriate  SKIN: No rash    LABS:                        12.5   7.71  )-----------( 400      ( 08 Apr 2025 10:36 )             39.3     04-08    136  |  99  |  9   ----------------------------<  68[L]  4.5   |  23  |  0.98    Ca    9.1      08 Apr 2025 10:36  Phos  3.0     04-08  Mg     1.9     04-08            Urinalysis Basic - ( 08 Apr 2025 10:36 )    Color: x / Appearance: x / SG: x / pH: x  Gluc: 68 mg/dL / Ketone: x  / Bili: x / Urobili: x   Blood: x / Protein: x / Nitrite: x   Leuk Esterase: x / RBC: x / WBC x   Sq Epi: x / Non Sq Epi: x / Bacteria: x          Tele Reviewed:    RADIOLOGY & ADDITIONAL TESTS:  Results Reviewed:   Imaging Personally Reviewed:  Electrocardiogram Personally Reviewed:     PROGRESS NOTE:   Authored by Dr. Rivera Carter MD     Patient is a 44y old  Female who presents with a chief complaint of abscess (08 Apr 2025 21:43)      SUBJECTIVE / OVERNIGHT EVENTS:  No acute events overnight.   Seen at bedside and is still having a headache and some dizziness, but overall doing okay. She does have some pain inher arm from where she removed and replaced the pump    MEDICATIONS  (STANDING):  acetaminophen   IVPB .. 1000 milliGRAM(s) IV Intermittent once  buDESOnide    Inhalation Suspension 0.5 milliGRAM(s) Inhalation daily  chlorhexidine 2% Cloths 1 Application(s) Topical daily  fluticasone propionate 50 MICROgram(s)/spray Nasal Spray 1 Spray(s) Both Nostrils two times a day  influenza   Vaccine 0.5 milliLiter(s) IntraMuscular once  lidocaine   4% Patch 1 Patch Transdermal daily  lidocaine 1%/epinephrine 1:100,000 Inj 20 milliLiter(s) Local Injection once  linezolid    Tablet 600 milliGRAM(s) Oral every 12 hours  LORazepam   Injectable 0.5 milliGRAM(s) IV Push once  montelukast 10 milliGRAM(s) Oral daily  pantoprazole    Tablet 40 milliGRAM(s) Oral before breakfast  polyethylene glycol 3350 17 Gram(s) Oral two times a day  remodulin (treprostinil) SubQ infusion 1 Dose(s) 1 Dose(s) SubCutaneous Continuous Pump  rivaroxaban 15 milliGRAM(s) Oral with dinner  senna 2 Tablet(s) Oral at bedtime    MEDICATIONS  (PRN):  acetaminophen     Tablet .. 1000 milliGRAM(s) Oral every 6 hours PRN Mild Pain (1 - 3), Moderate Pain (4 - 6)  albuterol    90 MICROgram(s) HFA Inhaler 2 Puff(s) Inhalation every 6 hours PRN Shortness of Breath  albuterol/ipratropium for Nebulization 3 milliLiter(s) Nebulizer every 6 hours PRN Shortness of Breath  melatonin 5 milliGRAM(s) Oral at bedtime PRN Insomnia  oxyCODONE    IR 2.5 milliGRAM(s) Oral every 6 hours PRN Severe Pain (7 - 10)      CAPILLARY BLOOD GLUCOSE        I&O's Summary    08 Apr 2025 07:01  -  09 Apr 2025 07:00  --------------------------------------------------------  IN: 1100 mL / OUT: 0 mL / NET: 1100 mL        PHYSICAL EXAM:  Vital Signs Last 24 Hrs  T(C): 36.6 (09 Apr 2025 04:38), Max: 36.7 (08 Apr 2025 09:00)  T(F): 97.9 (09 Apr 2025 04:38), Max: 98.1 (08 Apr 2025 09:00)  HR: 92 (09 Apr 2025 04:38) (87 - 105)  BP: 90/64 (09 Apr 2025 04:38) (83/61 - 103/74)  BP(mean): --  RR: 18 (09 Apr 2025 04:38) (18 - 18)  SpO2: 99% (09 Apr 2025 04:38) (94% - 99%)    Parameters below as of 08 Apr 2025 21:35  Patient On (Oxygen Delivery Method): nasal cannula  O2 Flow (L/min): 2      CONSTITUTIONAL: NAD  HEET: MMM, EOMI, PERRLA  NECK: supple  RESPIRATORY: Normal respiratory effort; lungs are clear to auscultation bilaterally  CARDIOVASCULAR: Regular rate and rhythm, normal S1 and S2, no murmur/rub/gallop; No lower extremity edema; Peripheral pulses are 2+ bilaterally  ABDOMEN: Nontender to palpation, normoactive bowel sounds, no rebound/guarding; No hepatosplenomegaly  MUSCULOSKELETAL: no clubbing or cyanosis of digits; no joint swelling or tenderness to palpation  PSYCH: A+O to person, place, and time; affect appropriate  SKIN: No rash    LABS:                        12.5   7.71  )-----------( 400      ( 08 Apr 2025 10:36 )             39.3     04-08    136  |  99  |  9   ----------------------------<  68[L]  4.5   |  23  |  0.98    Ca    9.1      08 Apr 2025 10:36  Phos  3.0     04-08  Mg     1.9     04-08            Urinalysis Basic - ( 08 Apr 2025 10:36 )    Color: x / Appearance: x / SG: x / pH: x  Gluc: 68 mg/dL / Ketone: x  / Bili: x / Urobili: x   Blood: x / Protein: x / Nitrite: x   Leuk Esterase: x / RBC: x / WBC x   Sq Epi: x / Non Sq Epi: x / Bacteria: x          Tele Reviewed:    RADIOLOGY & ADDITIONAL TESTS:  Results Reviewed:   Imaging Personally Reviewed:  Electrocardiogram Personally Reviewed:

## 2025-04-09 NOTE — PROGRESS NOTE ADULT - PROBLEM SELECTOR PLAN 2
Sent in by outside pulmonary doctor for concern that abscess was not resolving   Patient has Remodulin pump in the area of the abscess   Started two weeks ago and was started on doxycycline   Denies fevers, chills, vomiting, myalgias during this time period   Had chronic diarrhea and occasional vomiting as a known side effects of some of her medications   Patient is allergic to vancomycin   - CT abdomen pelvis Peripherally enhancing lesion in the right mid abdominal wall subjacent to diffuse skin thickening measuring 5.0 x 2.1 x 4.0 cm consistent with phlegmon/abscess  Superficial skin thickening in the left abdominal wall at the level of the umbilicus. Underlying small abscess measures 8 mm.  MRSA on wound culture     PLAN  - patient now endorsing new pain overlying the insertion site of the pump, will monitor for now pending new workup   - Linezolid 600 Q12 PO hours for   - can continue linezolid 600 q12 oral for total 10 to 14 days - PA approved, will have to resent once she is closer to dc  - If patient begins to decompensate, can switch to Daptomycin 6mg/kg  - Blood cultures x2 - NGTD  - s/p I&D - ID following Patient does run low 90s   Has been in the mid to low 80s recently   Per the patient, she does not always consistently take diuretics at home   He is prescribed Bumex and aldactone at home   Has also had headaches during these periods of hypotension     PLAN  - HF consult - holding all Diuretics   - will hold bumex and aldactone   - per pulmonary - Please check BNP, TTE, Blood cultures, CRP, ESR, Ferritin  - oxycodone PRN 2.5 Q6 for headache

## 2025-04-09 NOTE — PROGRESS NOTE ADULT - PROBLEM SELECTOR PLAN 8
No BM since admission   Has been using opiods for headache management     PLAN  - continue Miralax BID and apryl No BM since admission   Has been using opiods for headache management   2 BMs on 4/8    PLAN  - continue Miralax BID and apryl

## 2025-04-09 NOTE — PROGRESS NOTE ADULT - PROBLEM SELECTOR PLAN 6
2/2 to pulmonary hypertension   Seen by Dr Yoon outpatient       PLAN  - Hold home nifedipine for low BP  - holding aldactone per pulm  - holding bumex for hypotension On Xarelto 15 outpatient for previus DVT     PLAN  - restarting per surgery

## 2025-04-09 NOTE — PROGRESS NOTE ADULT - ATTENDING COMMENTS
44F w/ PMH of pHTN (on Remodulin sq and sildenafil), UE DVT on Xarelto, Asthma and CHF p/w abd wall abscess s/p drainage.    Cont deny fever. abdominal pain resolved    Appears comfortable in bed but has vertigo w/ nystagmus  Seen by Neuro and appears to be peripheral vertigo      -cont to hold diuresis today and monitor sx  - cont midodrine 10mg for now  - cont hold  sildenafil today given BP  -  cont remodulin  - f/u inflammatory markers - all negative  - ENT eval for peripheral vertigo eval as per Neuro  -cont NC to goal sat approx 95%  -cont ICS and BD tx (on fasenra as outpt)  - f/u Transplant Pulm reccs     d/w Phtn team  outpt f/u with Transplant Pulm and Phtn clinic 44F w/ PMH of pHTN (on Remodulin sq and sildenafil), UE DVT on Xarelto, Asthma and CHF p/w abd wall abscess s/p drainage.    Cont deny fever. abdominal pain resolved    Appears comfortable in bed but has vertigo w/ nystagmus  Seen by Neuro and appears to be peripheral vertigo      -cont to hold diuresis today and monitor sx  - cont midodrine 10mg for now  - cont hold  sildenafil today given BP  -  cont remodulin  - f/u inflammatory markers - all negative and bcx ngtd  - on linezolid  - ENT eval for peripheral vertigo eval as per Neuro  -cont NC to goal sat approx 95%  -cont ICS and BD tx (on fasenra as outpt)  - f/u Transplant Pulm reccs   - on a/c for VTE hx    d/w Phtn team  outpt f/u with Transplant Pulm and Phtn clinic

## 2025-04-09 NOTE — PROGRESS NOTE ADULT - PROBLEM SELECTOR PLAN 3
At home takes Atrovent nebulizer, Spiriva, Pulmicort?  Pulmonary medicine is following     PLAN  - continue therapeutic interchange Atrovent nebulizer ipratropium PRN   - continue home equivalent Pulmicort, Spiriva, PRN nebs  - continue Flonase  - continue on baseline 2L O2   - continue to watch respiratory status Sent in by outside pulmonary doctor for concern that abscess was not resolving   Patient has Remodulin pump in the area of the abscess   Started two weeks ago and was started on doxycycline   Denies fevers, chills, vomiting, myalgias during this time period   Had chronic diarrhea and occasional vomiting as a known side effects of some of her medications   Patient is allergic to vancomycin   - CT abdomen pelvis Peripherally enhancing lesion in the right mid abdominal wall subjacent to diffuse skin thickening measuring 5.0 x 2.1 x 4.0 cm consistent with phlegmon/abscess  Superficial skin thickening in the left abdominal wall at the level of the umbilicus. Underlying small abscess measures 8 mm.  MRSA on wound culture     PLAN  - patient now endorsing new pain overlying the insertion site of the pump, will monitor for now pending new workup   - Linezolid 600 Q12 PO hours end of date 4/16/25  - can continue linezolid 600 q12 oral for total 10 to 14 days - PA approved, will have to resent once she is closer to dc  - If patient begins to decompensate, can switch to Daptomycin 6mg/kg  - Blood cultures x2 - NGTD  - s/p I&D - ID following

## 2025-04-09 NOTE — CONSULT NOTE ADULT - ASSESSMENT
Assessment/Summary:  -44W  Hx pulmonary hypertension, right sided hear failure with dual chamber PPM (2016), chronic PE (dx 2017), on rivaroxaban, asthma, hx MRSA abdominal wall abscess feb /25 -presented 4/2/25 for abdominal abscess, new hypotension iso right heart failure ongoing transplant eval. This hospitalization she has had hypotension to low 80s (starting midodrine 4/9)  -Neurology consulted 4/9/25 for headaches and vertigo.  -Headaches similar to prior but more frequent and more severe. +throbbing +photophobia . Responds to oxycodone / hydromorphone but not acetaminophen  -Vertigo is positional and paroxysmal. Responded well to meclizine 12.5mg x 1  -Neuro exam with left corrective saccade on right head impulse, only end gaze nystagmus, and no skew   She has chronic headaches for last 2 years. Headaches were holocephalic, perhaps worse posteriorly, throbb  -CTH (4/7/25) unrevealing    Impression:  Migraine without aura, worse in setting of acute medical illness  Peripheral vertigo    Recommendations  #Migraine without aura  Baseline 1 episode per week, now daily and more intense while hospitalized responsive to opioids here, less so to acetaminophen   -Consider non-opioid treatment of head pain  -First line: recommend migraine medications (to be given all together at the same time if no contraindications form comorbitities): ketorolac (Toradol) 30mg IV q8h PRN (or acetaminophen 1g IV q8h PRN), metoclopramide (Reglan) 10mg q8h PRN, diphenhydramine (Benadryl) 25mg IV q8h PRN. Please repeat at least 2-3 cycles for medications to be effective.  -IV hydration, Mg 2g IV x1    #Peripheral Vertigo  -Physical therapy  -Vestibular Rehab Referral  -ENT Referral  -[email or web address] for patient education  -Check orthostatic vitals   -IV Fluids as safe/able (after orthostatic vitals) consider bolus and then maintenance fluids if no contraindications  -Meclizine PRN, she responded well to 1x dose of 12.5mg

## 2025-04-10 LAB
ALBUMIN SERPL ELPH-MCNC: 3.9 G/DL — SIGNIFICANT CHANGE UP (ref 3.3–5)
ALP SERPL-CCNC: 63 U/L — SIGNIFICANT CHANGE UP (ref 40–120)
ALT FLD-CCNC: 5 U/L — LOW (ref 10–45)
ANION GAP SERPL CALC-SCNC: 12 MMOL/L — SIGNIFICANT CHANGE UP (ref 5–17)
AST SERPL-CCNC: 9 U/L — LOW (ref 10–40)
BASOPHILS # BLD AUTO: 0.07 K/UL — SIGNIFICANT CHANGE UP (ref 0–0.2)
BASOPHILS NFR BLD AUTO: 0.7 % — SIGNIFICANT CHANGE UP (ref 0–2)
BILIRUB SERPL-MCNC: 0.9 MG/DL — SIGNIFICANT CHANGE UP (ref 0.2–1.2)
BUN SERPL-MCNC: 8 MG/DL — SIGNIFICANT CHANGE UP (ref 7–23)
CALCIUM SERPL-MCNC: 8.7 MG/DL — SIGNIFICANT CHANGE UP (ref 8.4–10.5)
CHLORIDE SERPL-SCNC: 99 MMOL/L — SIGNIFICANT CHANGE UP (ref 96–108)
CO2 SERPL-SCNC: 22 MMOL/L — SIGNIFICANT CHANGE UP (ref 22–31)
CREAT SERPL-MCNC: 1.01 MG/DL — SIGNIFICANT CHANGE UP (ref 0.5–1.3)
EGFR: 70 ML/MIN/1.73M2 — SIGNIFICANT CHANGE UP
EGFR: 70 ML/MIN/1.73M2 — SIGNIFICANT CHANGE UP
EOSINOPHIL # BLD AUTO: 0 K/UL — SIGNIFICANT CHANGE UP (ref 0–0.5)
EOSINOPHIL NFR BLD AUTO: 0 % — SIGNIFICANT CHANGE UP (ref 0–6)
GLUCOSE SERPL-MCNC: 85 MG/DL — SIGNIFICANT CHANGE UP (ref 70–99)
HCT VFR BLD CALC: 39.9 % — SIGNIFICANT CHANGE UP (ref 34.5–45)
HGB BLD-MCNC: 12.5 G/DL — SIGNIFICANT CHANGE UP (ref 11.5–15.5)
IMM GRANULOCYTES NFR BLD AUTO: 0.4 % — SIGNIFICANT CHANGE UP (ref 0–0.9)
LYMPHOCYTES # BLD AUTO: 2.23 K/UL — SIGNIFICANT CHANGE UP (ref 1–3.3)
LYMPHOCYTES # BLD AUTO: 20.9 % — SIGNIFICANT CHANGE UP (ref 13–44)
MAGNESIUM SERPL-MCNC: 2 MG/DL — SIGNIFICANT CHANGE UP (ref 1.6–2.6)
MCHC RBC-ENTMCNC: 22.2 PG — LOW (ref 27–34)
MCHC RBC-ENTMCNC: 31.3 G/DL — LOW (ref 32–36)
MCV RBC AUTO: 70.7 FL — LOW (ref 80–100)
MONOCYTES # BLD AUTO: 0.75 K/UL — SIGNIFICANT CHANGE UP (ref 0–0.9)
MONOCYTES NFR BLD AUTO: 7 % — SIGNIFICANT CHANGE UP (ref 2–14)
NEUTROPHILS # BLD AUTO: 7.58 K/UL — HIGH (ref 1.8–7.4)
NEUTROPHILS NFR BLD AUTO: 71 % — SIGNIFICANT CHANGE UP (ref 43–77)
NRBC BLD AUTO-RTO: 0 /100 WBCS — SIGNIFICANT CHANGE UP (ref 0–0)
PHOSPHATE SERPL-MCNC: 2.7 MG/DL — SIGNIFICANT CHANGE UP (ref 2.5–4.5)
PLATELET # BLD AUTO: 429 K/UL — HIGH (ref 150–400)
POTASSIUM SERPL-MCNC: 4.4 MMOL/L — SIGNIFICANT CHANGE UP (ref 3.5–5.3)
POTASSIUM SERPL-SCNC: 4.4 MMOL/L — SIGNIFICANT CHANGE UP (ref 3.5–5.3)
PROT SERPL-MCNC: 7.1 G/DL — SIGNIFICANT CHANGE UP (ref 6–8.3)
RBC # BLD: 5.64 M/UL — HIGH (ref 3.8–5.2)
RBC # FLD: 16.8 % — HIGH (ref 10.3–14.5)
SODIUM SERPL-SCNC: 133 MMOL/L — LOW (ref 135–145)
WBC # BLD: 10.67 K/UL — HIGH (ref 3.8–10.5)
WBC # FLD AUTO: 10.67 K/UL — HIGH (ref 3.8–10.5)

## 2025-04-10 PROCEDURE — 99233 SBSQ HOSP IP/OBS HIGH 50: CPT | Mod: GC

## 2025-04-10 PROCEDURE — 76705 ECHO EXAM OF ABDOMEN: CPT | Mod: 26

## 2025-04-10 PROCEDURE — 93010 ELECTROCARDIOGRAM REPORT: CPT

## 2025-04-10 PROCEDURE — 99233 SBSQ HOSP IP/OBS HIGH 50: CPT

## 2025-04-10 RX ORDER — SODIUM CHLORIDE 9 G/1000ML
250 INJECTION, SOLUTION INTRAVENOUS ONCE
Refills: 0 | Status: COMPLETED | OUTPATIENT
Start: 2025-04-10 | End: 2025-04-10

## 2025-04-10 RX ORDER — MIDODRINE HYDROCHLORIDE 5 MG/1
10 TABLET ORAL ONCE
Refills: 0 | Status: COMPLETED | OUTPATIENT
Start: 2025-04-10 | End: 2025-04-10

## 2025-04-10 RX ORDER — ONDANSETRON HCL/PF 4 MG/2 ML
4 VIAL (ML) INJECTION ONCE
Refills: 0 | Status: COMPLETED | OUTPATIENT
Start: 2025-04-10 | End: 2025-04-10

## 2025-04-10 RX ORDER — DIPHENHYDRAMINE HCL 12.5MG/5ML
25 ELIXIR ORAL ONCE
Refills: 0 | Status: COMPLETED | OUTPATIENT
Start: 2025-04-10 | End: 2025-04-10

## 2025-04-10 RX ORDER — KETOROLAC TROMETHAMINE 30 MG/ML
15 INJECTION, SOLUTION INTRAMUSCULAR; INTRAVENOUS ONCE
Refills: 0 | Status: DISCONTINUED | OUTPATIENT
Start: 2025-04-10 | End: 2025-04-10

## 2025-04-10 RX ORDER — METOCLOPRAMIDE HCL 10 MG
10 TABLET ORAL ONCE
Refills: 0 | Status: COMPLETED | OUTPATIENT
Start: 2025-04-10 | End: 2025-04-10

## 2025-04-10 RX ORDER — KETOROLAC TROMETHAMINE 30 MG/ML
30 INJECTION, SOLUTION INTRAMUSCULAR; INTRAVENOUS ONCE
Refills: 0 | Status: DISCONTINUED | OUTPATIENT
Start: 2025-04-10 | End: 2025-04-10

## 2025-04-10 RX ORDER — MIDODRINE HYDROCHLORIDE 5 MG/1
20 TABLET ORAL THREE TIMES A DAY
Refills: 0 | Status: DISCONTINUED | OUTPATIENT
Start: 2025-04-10 | End: 2025-04-17

## 2025-04-10 RX ORDER — MELATONIN 5 MG
9 TABLET ORAL AT BEDTIME
Refills: 0 | Status: DISCONTINUED | OUTPATIENT
Start: 2025-04-10 | End: 2025-04-24

## 2025-04-10 RX ADMIN — KETOROLAC TROMETHAMINE 30 MILLIGRAM(S): 30 INJECTION, SOLUTION INTRAMUSCULAR; INTRAVENOUS at 13:25

## 2025-04-10 RX ADMIN — Medication 10 MILLIGRAM(S): at 13:25

## 2025-04-10 RX ADMIN — KETOROLAC TROMETHAMINE 15 MILLIGRAM(S): 30 INJECTION, SOLUTION INTRAMUSCULAR; INTRAVENOUS at 21:40

## 2025-04-10 RX ADMIN — Medication 25 MILLIGRAM(S): at 13:25

## 2025-04-10 RX ADMIN — KETOROLAC TROMETHAMINE 30 MILLIGRAM(S): 30 INJECTION, SOLUTION INTRAMUSCULAR; INTRAVENOUS at 14:00

## 2025-04-10 RX ADMIN — LINEZOLID 600 MILLIGRAM(S): 2 INJECTION, SOLUTION INTRAVENOUS at 05:12

## 2025-04-10 RX ADMIN — MONTELUKAST SODIUM 10 MILLIGRAM(S): 10 TABLET ORAL at 11:38

## 2025-04-10 RX ADMIN — IPRATROPIUM BROMIDE AND ALBUTEROL SULFATE 3 MILLILITER(S): .5; 2.5 SOLUTION RESPIRATORY (INHALATION) at 05:12

## 2025-04-10 RX ADMIN — KETOROLAC TROMETHAMINE 15 MILLIGRAM(S): 30 INJECTION, SOLUTION INTRAMUSCULAR; INTRAVENOUS at 21:07

## 2025-04-10 RX ADMIN — Medication 1 APPLICATION(S): at 11:40

## 2025-04-10 RX ADMIN — Medication 9 MILLIGRAM(S): at 21:09

## 2025-04-10 RX ADMIN — MIDODRINE HYDROCHLORIDE 10 MILLIGRAM(S): 5 TABLET ORAL at 05:12

## 2025-04-10 RX ADMIN — MIDODRINE HYDROCHLORIDE 20 MILLIGRAM(S): 5 TABLET ORAL at 17:55

## 2025-04-10 RX ADMIN — MIDODRINE HYDROCHLORIDE 20 MILLIGRAM(S): 5 TABLET ORAL at 11:36

## 2025-04-10 RX ADMIN — RIVAROXABAN 15 MILLIGRAM(S): 10 TABLET, FILM COATED ORAL at 18:45

## 2025-04-10 RX ADMIN — LINEZOLID 600 MILLIGRAM(S): 2 INJECTION, SOLUTION INTRAVENOUS at 18:44

## 2025-04-10 RX ADMIN — Medication 25 MILLIGRAM(S): at 21:08

## 2025-04-10 RX ADMIN — FLUTICASONE PROPIONATE 1 SPRAY(S): 50 SPRAY, METERED NASAL at 05:13

## 2025-04-10 RX ADMIN — Medication 40 MILLIGRAM(S): at 05:13

## 2025-04-10 RX ADMIN — Medication 4 MILLIGRAM(S): at 21:07

## 2025-04-10 NOTE — PHYSICAL THERAPY INITIAL EVALUATION ADULT - PERTINENT HX OF CURRENT PROBLEM, REHAB EVAL
44y F, with PMH of pHTN (on treprostinil infusion still continuing ), Right  Heart failure(s/p PPM dual chamber ), chronic PE (dx ) on DOAC, SVT (s/p ablation 2020), asthma, h/o MRSA abdominal wall abscesses last , and JULIA presenting for treatment of new abdominal abscess s/p I&D drainage on abx treatment wound culture MRSA and pending dispo with oral linezolid. ENT consulted for dizziness and vertigo. per patient, when she had the dizzy spells, she experienced room spinning sensation. CTH (25) unrevealing Impression:  Migraine without aura, worse in setting of acute medical illness, Peripheral vertigo 44y F, with PMH of pHTN (on treprostinil infusion still continuing ), Right side Heart failure(s/p PPM dual chamber 2016), chronic PE (dx 2017) on DOAC, SVT (s/p ablation 05/2020), asthma, h/o MRSA abdominal wall abscesses last 2/'25, and JULIA presenting for treatment of new abdominal abscess s/p I&D drainage on abx treatment wound culture MRSA and pending dispo with oral linezolid. ENT consulted for dizziness and vertigo. per patient, when she had the dizzy spells, she experienced room spinning sensation. CTH (4/7/25) unrevealing Impression:  Migraine without aura, worse in setting of acute medical illness, Peripheral vertigo + hypotension and placed on midodrine. 44y F, with PMH of pHTN (on treprostinil infusion still continuing ), Right side Heart failure(s/p PPM dual chamber 2016), chronic PE (dx 2017) on DOAC, SVT (s/p ablation 05/2020), asthma, h/o MRSA abdominal wall abscesses last 2/'25, and JULIA presenting for treatment of new abdominal abscess s/p I&D drainage on abx treatment wound culture MRSA and pending dispo with oral linezolid. ENT consulted for dizziness and vertigo. per patient, when she had the dizzy spells, she experienced room spinning sensation. CTH (4/7/25) unrevealing Impression:  Migraine without aura, worse in setting of acute medical illness, Peripheral vertigo + hypotension and placed on midodrine.   CT abdomen pelvis Peripherally enhancing lesion in the right mid abdominal wall subjacent to diffuse skin thickening measuring 5.0 x 2.1 x 4.0 cm consistent with phlegmon/abscess  Superficial skin thickening in the left abdominal wall at the level of the umbilicus. Underlying small abscess measures 8 mm. 44y F, with PMH of pHTN (on treprostinil infusion still continuing ), Right side Heart failure(s/p PPM dual chamber 2016), chronic PE (dx 2017) on DOAC, SVT (s/p ablation 05/2020), asthma, h/o MRSA abdominal wall abscesses last 2/'25, and JULIA presenting for treatment of new abdominal abscess s/p I&D drainage on abx treatment wound culture MRSA and pending dispo with oral linezolid. ENT consulted for dizziness and vertigo. per patient, when she had the dizzy spells, she experienced room spinning sensation. CTH (4/7/25) unrevealing Impression:  Migraine without aura, worse in setting of acute medical illness, Peripheral vertigo + hypotension and placed on midodrine.   CT abdomen pelvis Peripherally enhancing lesion in the right mid abdominal wall subjacent to diffuse skin thickening measuring 5.0 x 2.1 x 4.0 cm consistent with phlegmon/abscess  Superficial skin thickening in the left abdominal wall at the level of the umbilicus. Underlying small abscess measures 8 mm. CXR 4/8 no focal consolidations 44y F, with PMH of pHTN (on treprostinil infusion still continuing ), Right side Heart failure(s/p PPM dual chamber 2016), chronic PE (dx 2017) on DOAC, SVT (s/p ablation 05/2020), asthma, h/o MRSA abdominal wall abscesses last 2/'25, and JULIA presenting for treatment of new abdominal abscess s/p I&D drainage on abx treatment wound culture MRSA and pending dispo with oral linezolid. ENT consulted for dizziness and vertigo. per patient, when she had the dizzy spells, she experienced room spinning sensation. CTH (4/7/25) unrevealing Impression:  Migraine without aura, worse in setting of acute medical illness, Peripheral vertigo + hypotension and placed on midodrine.   CT abdomen pelvis Peripherally enhancing lesion in the right mid abdominal wall subjacent to diffuse skin thickening measuring 5.0 x 2.1 x 4.0 cm consistent with phlegmon/abscess  Superficial skin thickening in the left abdominal wall at the level of the umbilicus. Underlying small abscess measures 8 mm. CXR 4/8 no focal consolidations //CT head (-)

## 2025-04-10 NOTE — PROGRESS NOTE ADULT - SUBJECTIVE AND OBJECTIVE BOX
PROGRESS NOTE:   Authored by Dr. Rivera Carter MD     Patient is a 44y old  Female who presents with a chief complaint of Dizziness and heacache (09 Apr 2025 16:21)      SUBJECTIVE / OVERNIGHT EVENTS:  No acute events overnight.     MEDICATIONS  (STANDING):  acetaminophen   IVPB .. 1000 milliGRAM(s) IV Intermittent once  buDESOnide    Inhalation Suspension 0.5 milliGRAM(s) Inhalation daily  chlorhexidine 2% Cloths 1 Application(s) Topical daily  fluticasone propionate 50 MICROgram(s)/spray Nasal Spray 1 Spray(s) Both Nostrils two times a day  influenza   Vaccine 0.5 milliLiter(s) IntraMuscular once  lidocaine   4% Patch 1 Patch Transdermal daily  lidocaine 1%/epinephrine 1:100,000 Inj 20 milliLiter(s) Local Injection once  linezolid    Tablet 600 milliGRAM(s) Oral every 12 hours  midodrine. 10 milliGRAM(s) Oral three times a day  montelukast 10 milliGRAM(s) Oral daily  pantoprazole    Tablet 40 milliGRAM(s) Oral before breakfast  polyethylene glycol 3350 17 Gram(s) Oral two times a day  remodulin (treprostinil) SubQ infusion 1 Dose(s) 1 Dose(s) SubCutaneous Continuous Pump  rivaroxaban 15 milliGRAM(s) Oral with dinner  senna 2 Tablet(s) Oral at bedtime    MEDICATIONS  (PRN):  acetaminophen     Tablet .. 1000 milliGRAM(s) Oral every 6 hours PRN Mild Pain (1 - 3), Moderate Pain (4 - 6)  albuterol    90 MICROgram(s) HFA Inhaler 2 Puff(s) Inhalation every 6 hours PRN Shortness of Breath  albuterol/ipratropium for Nebulization 3 milliLiter(s) Nebulizer every 6 hours PRN Shortness of Breath  melatonin 5 milliGRAM(s) Oral at bedtime PRN Insomnia  oxyCODONE    IR 2.5 milliGRAM(s) Oral every 6 hours PRN Severe Pain (7 - 10)      CAPILLARY BLOOD GLUCOSE        I&O's Summary      PHYSICAL EXAM:  Vital Signs Last 24 Hrs  T(C): 36.8 (10 Apr 2025 04:12), Max: 37.1 (10 Apr 2025 00:08)  T(F): 98.3 (10 Apr 2025 04:12), Max: 98.7 (10 Apr 2025 00:08)  HR: 89 (10 Apr 2025 04:12) (85 - 96)  BP: 94/63 (10 Apr 2025 04:12) (91/62 - 105/69)  BP(mean): --  RR: 18 (10 Apr 2025 04:12) (18 - 18)  SpO2: 99% (10 Apr 2025 04:12) (97% - 100%)    Parameters below as of 10 Apr 2025 04:12  Patient On (Oxygen Delivery Method): nasal cannula        CONSTITUTIONAL: NAD  HEET: MMM, EOMI, PERRLA  NECK: supple  RESPIRATORY: Normal respiratory effort; lungs are clear to auscultation bilaterally  CARDIOVASCULAR: Regular rate and rhythm, normal S1 and S2, no murmur/rub/gallop; No lower extremity edema; Peripheral pulses are 2+ bilaterally  ABDOMEN: Nontender to palpation, normoactive bowel sounds, no rebound/guarding; No hepatosplenomegaly  MUSCULOSKELETAL: no clubbing or cyanosis of digits; no joint swelling or tenderness to palpation  PSYCH: A+O to person, place, and time; affect appropriate  SKIN: No rash    LABS:                        12.5   7.71  )-----------( 400      ( 08 Apr 2025 10:36 )             39.3     04-08    136  |  99  |  9   ----------------------------<  68[L]  4.5   |  23  |  0.98    Ca    9.1      08 Apr 2025 10:36  Phos  3.0     04-08  Mg     1.9     04-08            Urinalysis Basic - ( 08 Apr 2025 10:36 )    Color: x / Appearance: x / SG: x / pH: x  Gluc: 68 mg/dL / Ketone: x  / Bili: x / Urobili: x   Blood: x / Protein: x / Nitrite: x   Leuk Esterase: x / RBC: x / WBC x   Sq Epi: x / Non Sq Epi: x / Bacteria: x        Culture - Blood (collected 08 Apr 2025 13:00)  Source: Blood Blood  Preliminary Report (09 Apr 2025 18:01):    No growth at 24 hours    Culture - Blood (collected 08 Apr 2025 12:55)  Source: Blood Blood  Preliminary Report (09 Apr 2025 18:01):    No growth at 24 hours        Tele Reviewed:    RADIOLOGY & ADDITIONAL TESTS:  Results Reviewed:   Imaging Personally Reviewed:  Electrocardiogram Personally Reviewed:     PROGRESS NOTE:   Authored by Dr. Rivera Carter MD     Patient is a 44y old  Female who presents with a chief complaint of Dizziness and heacache (09 Apr 2025 16:21)      SUBJECTIVE / OVERNIGHT EVENTS:  No acute events overnight.   Seen at bedside, headache improved, has been understanding of process thus far     MEDICATIONS  (STANDING):  acetaminophen   IVPB .. 1000 milliGRAM(s) IV Intermittent once  buDESOnide    Inhalation Suspension 0.5 milliGRAM(s) Inhalation daily  chlorhexidine 2% Cloths 1 Application(s) Topical daily  fluticasone propionate 50 MICROgram(s)/spray Nasal Spray 1 Spray(s) Both Nostrils two times a day  influenza   Vaccine 0.5 milliLiter(s) IntraMuscular once  lidocaine   4% Patch 1 Patch Transdermal daily  lidocaine 1%/epinephrine 1:100,000 Inj 20 milliLiter(s) Local Injection once  linezolid    Tablet 600 milliGRAM(s) Oral every 12 hours  midodrine. 10 milliGRAM(s) Oral three times a day  montelukast 10 milliGRAM(s) Oral daily  pantoprazole    Tablet 40 milliGRAM(s) Oral before breakfast  polyethylene glycol 3350 17 Gram(s) Oral two times a day  remodulin (treprostinil) SubQ infusion 1 Dose(s) 1 Dose(s) SubCutaneous Continuous Pump  rivaroxaban 15 milliGRAM(s) Oral with dinner  senna 2 Tablet(s) Oral at bedtime    MEDICATIONS  (PRN):  acetaminophen     Tablet .. 1000 milliGRAM(s) Oral every 6 hours PRN Mild Pain (1 - 3), Moderate Pain (4 - 6)  albuterol    90 MICROgram(s) HFA Inhaler 2 Puff(s) Inhalation every 6 hours PRN Shortness of Breath  albuterol/ipratropium for Nebulization 3 milliLiter(s) Nebulizer every 6 hours PRN Shortness of Breath  melatonin 5 milliGRAM(s) Oral at bedtime PRN Insomnia  oxyCODONE    IR 2.5 milliGRAM(s) Oral every 6 hours PRN Severe Pain (7 - 10)      CAPILLARY BLOOD GLUCOSE        I&O's Summary      PHYSICAL EXAM:  Vital Signs Last 24 Hrs  T(C): 36.8 (10 Apr 2025 04:12), Max: 37.1 (10 Apr 2025 00:08)  T(F): 98.3 (10 Apr 2025 04:12), Max: 98.7 (10 Apr 2025 00:08)  HR: 89 (10 Apr 2025 04:12) (85 - 96)  BP: 94/63 (10 Apr 2025 04:12) (91/62 - 105/69)  BP(mean): --  RR: 18 (10 Apr 2025 04:12) (18 - 18)  SpO2: 99% (10 Apr 2025 04:12) (97% - 100%)    Parameters below as of 10 Apr 2025 04:12  Patient On (Oxygen Delivery Method): nasal cannula        CONSTITUTIONAL: NAD  HEET: MMM, EOMI, PERRLA  NECK: supple  RESPIRATORY: Normal respiratory effort; lungs are clear to auscultation bilaterally  CARDIOVASCULAR: Regular rate and rhythm, normal S1 and S2, no murmur/rub/gallop; No lower extremity edema; Peripheral pulses are 2+ bilaterally  ABDOMEN: Nontender to palpation, normoactive bowel sounds, no rebound/guarding; No hepatosplenomegaly  MUSCULOSKELETAL: no clubbing or cyanosis of digits; no joint swelling or tenderness to palpation  PSYCH: A+O to person, place, and time; affect appropriate  SKIN: No rash    LABS:                        12.5   7.71  )-----------( 400      ( 08 Apr 2025 10:36 )             39.3     04-08    136  |  99  |  9   ----------------------------<  68[L]  4.5   |  23  |  0.98    Ca    9.1      08 Apr 2025 10:36  Phos  3.0     04-08  Mg     1.9     04-08            Urinalysis Basic - ( 08 Apr 2025 10:36 )    Color: x / Appearance: x / SG: x / pH: x  Gluc: 68 mg/dL / Ketone: x  / Bili: x / Urobili: x   Blood: x / Protein: x / Nitrite: x   Leuk Esterase: x / RBC: x / WBC x   Sq Epi: x / Non Sq Epi: x / Bacteria: x        Culture - Blood (collected 08 Apr 2025 13:00)  Source: Blood Blood  Preliminary Report (09 Apr 2025 18:01):    No growth at 24 hours    Culture - Blood (collected 08 Apr 2025 12:55)  Source: Blood Blood  Preliminary Report (09 Apr 2025 18:01):    No growth at 24 hours        Tele Reviewed:    RADIOLOGY & ADDITIONAL TESTS:  Results Reviewed:   Imaging Personally Reviewed:  Electrocardiogram Personally Reviewed:

## 2025-04-10 NOTE — CONSULT NOTE ADULT - ASSESSMENT
44yoF with pHTN, chronic PE, asthma undergoing pre-transplant evaluation found to have penicillin allergy (rash) and vancomycin infusion-related reaction in medical chart. Review of chart and questioning of patient and family shows no concerning features of reaction - reaction occurred, >5 years ago, did not involve angioedema/airway occlusion or require hospital referral for treatment and did not require treatment that family can recall. By PENFAST scoring, patient is low risk of true IgE-mediated hypersensitivity. Risks and benefits discussed with patient, and benefits of performing amoxicillin oral challenge outweigh low risks of reaction.     Recommendations:  -  44yoF with pHTN, chronic PE, asthma undergoing pre-transplant evaluation found to have penicillin allergy (rash) and vancomycin infusion-related reaction in medical chart. Review of chart and questioning of patient and family shows no concerning features of reaction - reaction occurred, >5 years ago, did not involve angioedema/airway occlusion or require hospital referral for treatment and did not require treatment that family can recall. By PENFAST scoring, patient is low risk of true IgE-mediated hypersensitivity. Counseled pt and family that >90% of penicillin allergies are mislabeled and pts likely outgrow or never had true allergy. Risks and benefits discussed with patient, and benefits of performing amoxicillin oral challenge outweigh low risks of reaction.      Recommendations:  - patient can undergo graded amoxicillin challenge while inpatient     - administer 10% of standard amoxicillin dose, then observe for 30 minutes     - if tolerated, administer remaining 90% of standard amoxicillin dose, then observe for 60 minutes  - if patient does not develop immediate reactions then is considered to be not allergic and penicillin allergy can be delabeled  - recommend having antihistamine and Epinephrine available PRN in case of reactions  - continue to avoid vancomycin - can be addressed outpatient in [facility]     Please feel free to message on MS Teams with any questions or concerns.   If after 5PM please page fellow on call.     Keith Fajardo MD  Fellow, Division of Allergy/Immunology  [phone]  44yoF with pHTN, chronic PE, asthma undergoing pre-transplant evaluation found to have penicillin allergy (rash) and vancomycin infusion-related reaction in medical chart. Review of chart and questioning of patient and family shows no concerning features of reaction - reaction occurred, >5 years ago, did not involve angioedema/airway occlusion or require hospital referral for treatment and did not require treatment that family can recall. By PENFAST scoring, patient is low risk of true IgE-mediated hypersensitivity. Counseled pt and family that >90% of penicillin allergies are mislabeled and pts likely outgrow or never had true allergy. Risks and benefits discussed with patient, and benefits of performing amoxicillin oral challenge outweigh low risks of reaction.      Recommendations:  - patient can undergo graded amoxicillin challenge while inpatient     - administer 10% of standard amoxicillin dose, then observe for 30 minutes     - if tolerated, administer remaining 90% of standard amoxicillin dose, then observe for 60 minutes  - if patient does not develop immediate reactions then is considered to be not allergic and penicillin allergy can be delabeled  - recommend having antihistamine and Epinephrine available PRN in case of reactions  - recommend to perform challenge during daytime with staffing available for monitoring  - continue to avoid vancomycin - can be addressed outpatient in our [facility]     Please feel free to message on MS Teams with any questions or concerns.   If after 5PM please page fellow on call.     Keith Fajardo MD  Fellow, Division of Allergy/Immunology  [phone]

## 2025-04-10 NOTE — PHYSICAL THERAPY INITIAL EVALUATION ADULT - ADDITIONAL COMMENTS
Lives in apartment on 5th floor with elevator. 5 stairs with railings on either side to enter building. Lives in apartment on 5th floor with elevator. 5 stairs with railings on either side to enter building. has rolling walker for long distances, contact, hearing good and right handed

## 2025-04-10 NOTE — PROGRESS NOTE ADULT - PROBLEM SELECTOR PLAN 2
Patient does run low 90s   Has been in the mid to low 80s recently   Per the patient, she does not always consistently take diuretics at home   He is prescribed Bumex and aldactone at home   Has also had headaches during these periods of hypotension     PLAN  - HF consult - holding all Diuretics   - will hold bumex and aldactone   - per pulmonary - Please check BNP, TTE, Blood cultures, CRP, ESR, Ferritin  - oxycodone PRN 2.5 Q6 for headache Patient does run low 90s   Has been in the mid to low 80s recently   Per the patient, she does not always consistently take diuretics at home   He is prescribed Bumex and aldactone at home   Has also had headaches during these periods of hypotension     PLAN  - HF consult - holding all Diuretics, holding sildenaphil   - will hold bumex and aldactone   - midodrine 20 TID now   - oxycodone PRN 2.5 Q6 for headache

## 2025-04-10 NOTE — PROGRESS NOTE ADULT - SUBJECTIVE AND OBJECTIVE BOX
Pulmonary Consult Follow up     Interval Events:    Feeling better. dizziness has improved with meclizine. Able to ambulate today.     REVIEW OF SYSTEMS:  [x] All other systems negative except per HPI   [ ] Unable to assess ROS because ________    OBJECTIVE:  ICU Vital Signs Last 24 Hrs  T(C): 36.7 (10 Apr 2025 10:53), Max: 37.1 (10 Apr 2025 00:08)  T(F): 98 (10 Apr 2025 10:53), Max: 98.7 (10 Apr 2025 00:08)  HR: 92 (10 Apr 2025 10:53) (82 - 92)  BP: 111/71 (10 Apr 2025 10:53) (91/62 - 111/71)  BP(mean): --  ABP: --  ABP(mean): --  RR: 18 (10 Apr 2025 10:53) (18 - 18)  SpO2: 98% (10 Apr 2025 10:53) (98% - 100%)    O2 Parameters below as of 10 Apr 2025 10:53  Patient On (Oxygen Delivery Method): nasal cannula  O2 Flow (L/min): 2            04-10 @ 07:01  -  04-10 @ 17:20  --------------------------------------------------------  IN: 480 mL / OUT: 0 mL / NET: 480 mL        PHYSICAL EXAM:  GENERAL: NAD, well-groomed, well-developed  HEAD:  Atraumatic, Normocephalic  EYES: EOMI, conjunctiva and sclera clear  ENMT: Moist mucous membranes  CHEST/LUNG: Clear to auscultation bilaterally; No rales, rhonchi, wheezing, or rubs  HEART: Regular rate and rhythm; No murmurs, rubs, or gallops  ABDOMEN: Nontender  VASCULAR: No  cyanosis   SKIN: No rashes or lesions  NERVOUS SYSTEM:  Alert & Oriented X3, Good concentration    HOSPITAL MEDICATIONS:  MEDICATIONS  (STANDING):  acetaminophen   IVPB .. 1000 milliGRAM(s) IV Intermittent once  buDESOnide    Inhalation Suspension 0.5 milliGRAM(s) Inhalation daily  chlorhexidine 2% Cloths 1 Application(s) Topical daily  fluticasone propionate 50 MICROgram(s)/spray Nasal Spray 1 Spray(s) Both Nostrils two times a day  influenza   Vaccine 0.5 milliLiter(s) IntraMuscular once  lactated ringers Bolus 250 milliLiter(s) IV Bolus once  lidocaine   4% Patch 1 Patch Transdermal daily  lidocaine 1%/epinephrine 1:100,000 Inj 20 milliLiter(s) Local Injection once  linezolid    Tablet 600 milliGRAM(s) Oral every 12 hours  midodrine. 20 milliGRAM(s) Oral three times a day  montelukast 10 milliGRAM(s) Oral daily  pantoprazole    Tablet 40 milliGRAM(s) Oral before breakfast  polyethylene glycol 3350 17 Gram(s) Oral two times a day  remodulin (treprostinil) SubQ infusion 1 Dose(s) 1 Dose(s) SubCutaneous Continuous Pump  rivaroxaban 15 milliGRAM(s) Oral with dinner  senna 2 Tablet(s) Oral at bedtime    MEDICATIONS  (PRN):  acetaminophen     Tablet .. 1000 milliGRAM(s) Oral every 6 hours PRN Mild Pain (1 - 3), Moderate Pain (4 - 6)  albuterol    90 MICROgram(s) HFA Inhaler 2 Puff(s) Inhalation every 6 hours PRN Shortness of Breath  albuterol/ipratropium for Nebulization 3 milliLiter(s) Nebulizer every 6 hours PRN Shortness of Breath  melatonin 9 milliGRAM(s) Oral at bedtime PRN Insomnia  oxyCODONE    IR 2.5 milliGRAM(s) Oral every 6 hours PRN Severe Pain (7 - 10)      LABS:  04-10    133[L]  |  99  |  8   ----------------------------<  85  4.4   |  22  |  1.01  04-08    136  |  99  |  9   ----------------------------<  68[L]  4.5   |  23  |  0.98    Ca    8.7      10 Apr 2025 14:05  Ca    9.1      08 Apr 2025 10:36  Phos  2.7     04-10  Mg     2.0     04-10    TPro  7.1  /  Alb  3.9  /  TBili  0.9  /  DBili  x   /  AST  9[L]  /  ALT  5[L]  /  AlkPhos  63  04-10    Magnesium: 2.0 mg/dL (04-10-25 @ 14:05)  Magnesium: 1.9 mg/dL (04-08-25 @ 10:36)    Phosphorus: 2.7 mg/dL (04-10-25 @ 14:05)  Phosphorus: 3.0 mg/dL (04-08-25 @ 10:36)                    Urinalysis Basic - ( 10 Apr 2025 14:05 )    Color: x / Appearance: x / SG: x / pH: x  Gluc: 85 mg/dL / Ketone: x  / Bili: x / Urobili: x   Blood: x / Protein: x / Nitrite: x   Leuk Esterase: x / RBC: x / WBC x   Sq Epi: x / Non Sq Epi: x / Bacteria: x                              12.5   10.67 )-----------( 429      ( 10 Apr 2025 14:05 )             39.9                         12.5   7.71  )-----------( 400      ( 08 Apr 2025 10:36 )             39.3     CAPILLARY BLOOD GLUCOSE

## 2025-04-10 NOTE — PROGRESS NOTE ADULT - ATTENDING COMMENTS
Abdominal wall abscess s/p I&D- MRSA on PO Linezolid  Chronic hypoxic respiratory failure on home O2 due to pHTN with right sided heart failure- pulmonary following- continue Remodulin  Chronic HAs/dizziness- CT head unremarkable- seen by ENT and neurology- treating for vestibulitis and migraines  Hypotension- improved with holding diuretics and sildenafil- started on midodrine  R sided heart failure- TTE with EF 70%, severely enlarged RV and mild pHTN- pending cMRI  Asthma- continue Pulmicort, Singulair, Duonebs PRN, Flonase  DVT history- continue Xarelto  D/w transplant pulmonary- requesting patient remain hospitalized for full transplant assessment

## 2025-04-10 NOTE — PROGRESS NOTE ADULT - ATTENDING COMMENTS
44F w/ PMH of pHTN (on Remodulin sq and sildenafil), UE DVT on Xarelto, Asthma and CHF p/w abd wall abscess s/p drainage.    Cont deny fever. abdominal pain resolved    Seen by Neuro and appears to be peripheral vertigo  Vertigo and LH sx resolved   Feels much better today- ambulating in hallway without difficulty but did have +orthostatic changes       -cont to hold diuresis today and monitor sx  - cont midodrine    - will give small bolus and monitor response  - cont hold sildenafil today   -  cont remodulin  - f/u inflammatory markers - all negative and bcx ngtd  - on linezolid, f/u ID reccs  - ENT eval for peripheral vertigo eval as per Neuro  -cont NC to goal sat approx 95%  -cont ICS and BD tx (on fasenra as outpt)  - f/u Transplant Pulm reccs   - on a/c for VTE hx  - will need further w/u including cardiac mri, RHC this admission once stable    d/w Phtn team  outpt f/u with Transplant Pulm and Phtn clinic 44F w/ PMH of pHTN (on Remodulin sq and sildenafil), UE DVT on Xarelto, Asthma and CHF p/w abd wall abscess s/p drainage.    Cont deny fever. abdominal pain resolved    Seen by Neuro and appears to be peripheral vertigo  Vertigo and LH sx resolved   Feels much better today- ambulating in hallway without difficulty but did have +orthostatic changes     -wbc slightly increased, ctm    -cont to hold diuresis today and monitor sx  - cont midodrine    - will give small bolus and monitor response  - cont hold sildenafil today   -  cont remodulin  - f/u inflammatory markers - all negative and bcx ngtd  - on linezolid, f/u ID reccs  - ENT eval for peripheral vertigo eval as per Neuro  -cont NC to goal sat approx 95%  -cont ICS and BD tx (on fasenra as outpt)  - f/u Transplant Pulm reccs   - on a/c for VTE hx  - will need further w/u including cardiac mri, RHC this admission once stable    d/w Phtn team  outpt f/u with Transplant Pulm and Phtn clinic MEDICATIONS  (STANDING):  budesonide  80 MICROgram(s)/formoterol 4.5 MICROgram(s) Inhaler 2 Puff(s) Inhalation two times a day  FLUoxetine 10 milliGRAM(s) Oral daily    MEDICATIONS  (PRN):  ALBUTerol    90 MICROgram(s) HFA Inhaler 2 Puff(s) Inhalation every 6 hours PRN Shortness of Breath and/or Wheezing  AQUAPHOR (petrolatum Ointment) 1 Application(s) Topical three times a day PRN eczema  diphenhydrAMINE 25 milliGRAM(s) Oral every 6 hours PRN Rash and/or Itching  EPINEPHrine     1 mG/mL Injectable 0.3 milliGRAM(s) IntraMuscular once PRN ANAPHYLAXIS  melatonin. 3 milliGRAM(s) Oral at bedtime PRN Insomnia

## 2025-04-10 NOTE — PROGRESS NOTE ADULT - SUBJECTIVE AND OBJECTIVE BOX
Morning Surgical Progress Note  Patient is a 44y old  Female who presents with a chief complaint of Dizziness and heacache (09 Apr 2025 16:21)    SUBJECTIVE: Patient seen and examined at bedside with surgical team. Patient reports drainage from LLQ where a previous infusion site was.     Vital Signs Last 24 Hrs  T(C): 36.7 (10 Apr 2025 10:53), Max: 37.1 (10 Apr 2025 00:08)  T(F): 98 (10 Apr 2025 10:53), Max: 98.7 (10 Apr 2025 00:08)  HR: 92 (10 Apr 2025 10:53) (82 - 92)  BP: 111/71 (10 Apr 2025 10:53) (91/62 - 111/71)  BP(mean): --  RR: 18 (10 Apr 2025 10:53) (18 - 18)  SpO2: 98% (10 Apr 2025 10:53) (98% - 100%)    Parameters below as of 10 Apr 2025 10:53  Patient On (Oxygen Delivery Method): nasal cannula  O2 Flow (L/min): 2  I&O's Detail    10 Apr 2025 07:01  -  10 Apr 2025 17:10  --------------------------------------------------------  IN:    Oral Fluid: 480 mL  Total IN: 480 mL    OUT:  Total OUT: 0 mL    Total NET: 480 mL        Medications  MEDICATIONS  (STANDING):  acetaminophen   IVPB .. 1000 milliGRAM(s) IV Intermittent once  buDESOnide    Inhalation Suspension 0.5 milliGRAM(s) Inhalation daily  chlorhexidine 2% Cloths 1 Application(s) Topical daily  fluticasone propionate 50 MICROgram(s)/spray Nasal Spray 1 Spray(s) Both Nostrils two times a day  influenza   Vaccine 0.5 milliLiter(s) IntraMuscular once  lactated ringers Bolus 250 milliLiter(s) IV Bolus once  lidocaine   4% Patch 1 Patch Transdermal daily  lidocaine 1%/epinephrine 1:100,000 Inj 20 milliLiter(s) Local Injection once  linezolid    Tablet 600 milliGRAM(s) Oral every 12 hours  midodrine. 20 milliGRAM(s) Oral three times a day  montelukast 10 milliGRAM(s) Oral daily  pantoprazole    Tablet 40 milliGRAM(s) Oral before breakfast  polyethylene glycol 3350 17 Gram(s) Oral two times a day  remodulin (treprostinil) SubQ infusion 1 Dose(s) 1 Dose(s) SubCutaneous Continuous Pump  rivaroxaban 15 milliGRAM(s) Oral with dinner  senna 2 Tablet(s) Oral at bedtime    MEDICATIONS  (PRN):  acetaminophen     Tablet .. 1000 milliGRAM(s) Oral every 6 hours PRN Mild Pain (1 - 3), Moderate Pain (4 - 6)  albuterol    90 MICROgram(s) HFA Inhaler 2 Puff(s) Inhalation every 6 hours PRN Shortness of Breath  albuterol/ipratropium for Nebulization 3 milliLiter(s) Nebulizer every 6 hours PRN Shortness of Breath  melatonin 9 milliGRAM(s) Oral at bedtime PRN Insomnia  oxyCODONE    IR 2.5 milliGRAM(s) Oral every 6 hours PRN Severe Pain (7 - 10)    Physical Exam  General: No acute distress  Respiratory: Nonlabored on 2L NC   Cardiovascular: Regular Rate  Abdominal: Soft, nondistended. previous InD packed, dressing changed. New site in LLQ with some induration and had some drainage, no expressible fluid. Some hyperpigmentation no erythema. Non-tender to palpation.  Extremities: Warm    LABS:                        12.5   10.67 )-----------( 429      ( 10 Apr 2025 14:05 )             39.9     04-10    133[L]  |  99  |  8   ----------------------------<  85  4.4   |  22  |  1.01    Ca    8.7      10 Apr 2025 14:05  Phos  2.7     04-10  Mg     2.0     04-10    TPro  7.1  /  Alb  3.9  /  TBili  0.9  /  DBili  x   /  AST  9[L]  /  ALT  5[L]  /  AlkPhos  63  04-10      LIVER FUNCTIONS - ( 10 Apr 2025 14:05 )  Alb: 3.9 g/dL / Pro: 7.1 g/dL / ALK PHOS: 63 U/L / ALT: 5 U/L / AST: 9 U/L / GGT: x           Urinalysis Basic - ( 10 Apr 2025 14:05 )    Color: x / Appearance: x / SG: x / pH: x  Gluc: 85 mg/dL / Ketone: x  / Bili: x / Urobili: x   Blood: x / Protein: x / Nitrite: x   Leuk Esterase: x / RBC: x / WBC x   Sq Epi: x / Non Sq Epi: x / Bacteria: x

## 2025-04-10 NOTE — CONSULT NOTE ADULT - SUBJECTIVE AND OBJECTIVE BOX
Patient is a 44y old  Female who presents with a chief complaint of Dizziness and heacache (2025 16:21)    HPI:  44y F , with PMH of pHTN (on treprostinil infusion still continuing ), Right sided  Heart failure(s/p PPM dual chamber ), chronic PE (dx ) on DOAC, SVT (s/p ablation 2020), asthma, h/o MRSA abdominal wall abscesses last , and JULIA. was admitted after being sent in from outpatient pulmonologist. for concern of worsening abdominal abscess in proximity to medication pump that has been unresponsive to doxycyline. Per patient she started having abdominal pain and increased swelling two weeks ago. Went into her outpatient transplant ID doctor Dr Ash and was given a course of doxycyline yesterday she presented to her pulmonologist DR Yoon who was concerned about the size of the abscess and that it was not improving. She was sent in for further evaluation Patient denied cough, fevers, myalgias vomiting ot diarrhea during the time period of the abscess formations     ED course   Tmax 97.7, , /56 on 2L home   Not meeting SIRS criteria   Linezolid and Cefepime, Ofirmev, Morphine   (2025 16:47)    During course of admission, patient was referred for lung transplant; allergy consulted due to history of vancomycin and penicillin allergy, need for prophylactic antibiotic during post-transplant period.    Hx obtained via patient, , extensive inpatient and outpatient chart review.    She has documented penicillin allergy from 2019 outpatient chart as "rash", first reported then. No further information but has been instructed to avoid since that time. Also w/hx vancomycin-related infusion reaction causing redness, reported concurrently but also "hives" that were itchy all over despite rate adjustment. Reports hives from adhesives at contact sites. Otherwise no history of other known allergies. She and  both deny her ever needing an epipen for a serious allergic rxn.   Spoke w/patient as well as  over phone for collateral - reaction has occurred more than 5 years ago. Did not occur in outpatient setting; states that her reactions were at another hospital. She denies requiring hospital referral for an outpatient allergic reaction. Denies any lip/throat swelling or airway occlusion requiring advanced treatment with amoxicillin or penicillins. She states that approximately 2-3 years ago when admitted to another hospital, she may have been given amoxicillin but reports no reactions from that time.   Hx of asthma now on montelukast. No reported hx nasal polyps, anosmia, NSAID hypersensitivity. Documented allergic rhinitis in outpatient charge.         PAST MEDICAL & SURGICAL HISTORY:  Anemia      Pulmonary hypertension      Asthma  On home O2 nightly at 2L via NC      PE (pulmonary thromboembolism)  on Xarelto 10 mg daily      Sinusitis      Right heart failure      H/O pulmonary hypertension      Pulmonary embolism      History of tachycardia      On supplemental oxygen by nasal cannula  O2 @ 2L      Pacemaker      Right atrial thrombus      Hypotension      Pacemaker      History of pacemaker   - Brandle model Ingevity 4469            Allergies    vancomycin (Rash)  penicillin (Rash)  adhesives (Rash)    Intolerances      MEDICATIONS  (STANDING):  acetaminophen   IVPB .. 1000 milliGRAM(s) IV Intermittent once  buDESOnide    Inhalation Suspension 0.5 milliGRAM(s) Inhalation daily  chlorhexidine 2% Cloths 1 Application(s) Topical daily  fluticasone propionate 50 MICROgram(s)/spray Nasal Spray 1 Spray(s) Both Nostrils two times a day  influenza   Vaccine 0.5 milliLiter(s) IntraMuscular once  lidocaine   4% Patch 1 Patch Transdermal daily  lidocaine 1%/epinephrine 1:100,000 Inj 20 milliLiter(s) Local Injection once  linezolid    Tablet 600 milliGRAM(s) Oral every 12 hours  midodrine. 20 milliGRAM(s) Oral three times a day  montelukast 10 milliGRAM(s) Oral daily  pantoprazole    Tablet 40 milliGRAM(s) Oral before breakfast  polyethylene glycol 3350 17 Gram(s) Oral two times a day  remodulin (treprostinil) SubQ infusion 1 Dose(s) 1 Dose(s) SubCutaneous Continuous Pump  rivaroxaban 15 milliGRAM(s) Oral with dinner  senna 2 Tablet(s) Oral at bedtime    MEDICATIONS  (PRN):  acetaminophen     Tablet .. 1000 milliGRAM(s) Oral every 6 hours PRN Mild Pain (1 - 3), Moderate Pain (4 - 6)  albuterol    90 MICROgram(s) HFA Inhaler 2 Puff(s) Inhalation every 6 hours PRN Shortness of Breath  albuterol/ipratropium for Nebulization 3 milliLiter(s) Nebulizer every 6 hours PRN Shortness of Breath  melatonin 9 milliGRAM(s) Oral at bedtime PRN Insomnia  oxyCODONE    IR 2.5 milliGRAM(s) Oral every 6 hours PRN Severe Pain (7 - 10)      FAMILY HISTORY:  Family history of diabetes mellitus  in brother        Daily     Daily Weight in k.3 (10 Apr 2025 10:53)  Vital Signs Last 24 Hrs  T(C): 36.7 (10 Apr 2025 10:53), Max: 37.1 (10 Apr 2025 00:08)  T(F): 98 (10 Apr 2025 10:53), Max: 98.7 (10 Apr 2025 00:08)  HR: 92 (10 Apr 2025 10:53) (82 - 92)  BP: 111/71 (10 Apr 2025 10:53) (91/62 - 111/71)  BP(mean): --  RR: 18 (10 Apr 2025 10:53) (18 - 18)  SpO2: 98% (10 Apr 2025 10:53) (98% - 100%)    Parameters below as of 10 Apr 2025 10:53  Patient On (Oxygen Delivery Method): nasal cannula  O2 Flow (L/min): 2      Physical Exam:  General:	                    No apparent distress  HEENT:	                    Normocephalic atraumatic; nasal cannula in place; no angioedema  Respiratory	Breathing comfortably with oxygen, no retractions  Abdominal	+abdominal bandage midline; no drainage noted  Extremities	No cyanosis or edema  Skin		Intact and not indurated, no rash  Neurologic	Normal:      Grossly Intact    Lab Results:                        12.5   10.67 )-----------( 429      ( 10 Apr 2025 14:05 )             39.9                         12.5   7.71  )-----------( 400      ( 2025 10:36 )             39.3     10 Apr 2025 14:05    133    |  99     |  8      ----------------------------<  85     4.4     |  22     |  1.01   2025 10:36    136    |  99     |  9      ----------------------------<  68     4.5     |  23     |  0.98     Ca    8.7        10 Apr 2025 14:05  Ca    9.1        2025 10:36  Phos  2.7       10 Apr 2025 14:05  Phos  3.0       2025 10:36  Mg     2.0       10 Apr 2025 14:05  Mg     1.9       2025 10:36    TPro  7.1    /  Alb  3.9    /  TBili  0.9    /  DBili  x      /  AST  9      /  ALT  5      /  AlkPhos  63     10 Apr 2025 14:05    LIVER FUNCTIONS - ( 10 Apr 2025 14:05 )  Alb: 3.9 g/dL / Pro: 7.1 g/dL / ALK PHOS: 63 U/L / ALT: 5 U/L / AST: 9 U/L / GGT: x           Urinalysis Basic - ( 10 Apr 2025 14:05 )    Color: x / Appearance: x / SG: x / pH: x  Gluc: 85 mg/dL / Ketone: x  / Bili: x / Urobili: x   Blood: x / Protein: x / Nitrite: x   Leuk Esterase: x / RBC: x / WBC x   Sq Epi: x / Non Sq Epi: x / Bacteria: x

## 2025-04-10 NOTE — PROGRESS NOTE ADULT - PROBLEM SELECTOR PLAN 5
Seen outpatient by Dr Yoon who sent into the hospital   Has Remodulin pump near sight of infection    PLAN  - Pulmonary following   - per pulm - Please check BNP, TTE, Blood cultures, CRP, ESR, Ferritin  - -continue home sildenafil 20mg TID  - continue home Remodulin 51ng/kg/min on home pump  - HOLDING home aldactone  - HOLDING bumex for hypotension and dizziness  - HOLDING Sildenaphil

## 2025-04-10 NOTE — PHYSICAL THERAPY INITIAL EVALUATION ADULT - GENERAL OBSERVATIONS, REHAB EVAL
Pt rec'd in semifowlers in bed, +IV lock, +NC2L NAD Pt rec'd in semifowlers in bed, +IV lock, +NCO2, RUE pump, removed from abd and placed in right arm,  NAD

## 2025-04-10 NOTE — PROGRESS NOTE ADULT - ASSESSMENT
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

## 2025-04-10 NOTE — PROGRESS NOTE ADULT - PROBLEM SELECTOR PLAN 7
2/2 to pulmonary hypertension   Seen by Dr Yoon outpatient       PLAN  - Hold home nifedipine for low BP  - holding aldactone per pulm  - holding bumex for hypotension

## 2025-04-10 NOTE — PROGRESS NOTE ADULT - ASSESSMENT
44F w/ PMH of pHTN on Remodulin sq, sildenafil, UE DVT on Xarelto, Asthma, CHF  She is also following with transplant pulmonary and is finalizing pre-transplant evaluation.  Comes to the ED with concern with concern for abdominal abscesses. She was treated for this in the outpatient setting with doxycycline for the past 5 days without improvement.  Now being admitted for I&D and likely IV abx.  Skin lesions first started about 2 weeks ago. No fevers, chills, however local tenderness.    # pHTN  Patient has a known history of PAH on sildenfail, remodulin, diuretics. Also on O2 at home, 2-3lpm.   - hold sildenafil 20mg TID  - continue home Remodulin 51ng/kg/min on home pump  - monitor K, Na, Mg  - I&D performed on 4/3, cultures with MRSA  - in case of major surgery plans, please let pulmonary know, as patient might need adjustment to these medications  - Please hold diuretics given hypotension and dizziness. Pt appears euvolemic.  - Please hold sildenafil for now  - Orthostatic vitals are positive.  - Please given 250cc of LR and repeat orthostatics.      # asthma  Patient with known asthma, last received Fasenra on 4/1. PFTs from 1/2025 w/ severe obstruction, low DLCO.  - continue home equivalent pulmicort, spiriva, PRN atrovent nebs  - continue flonase

## 2025-04-10 NOTE — PROGRESS NOTE ADULT - ASSESSMENT
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 Patient p/w right sided abdominal pain x2 weeks and recurrent c/f abscess, was started on doxycycline outpatient however pain and swelling have worsened. Surgery performed an InD on 4/3. New site has some induration palpable. Previous CT on 4/2 noted some small collection in that area but no recent imaging to define collections.     Recommendations:   - Abd U/S for eval of collection   - InD pending U/S findings   - Please change packing in previous site daily for wound care.   - Discussed w/ patient and primary team     ACS/Trauma  [id number]

## 2025-04-10 NOTE — CONSULT NOTE ADULT - ATTENDING COMMENTS
DOS 4/10  Damian      44 F with pulmonary hypertension, right sided hear failure with dual chamber PPM (2016), chronic PE (dx 2017), on rivaroxaban, asthma, hx MRSA abdominal wall abscess feb /25 -presented 4/2/25 for abdominal abscess, new hypotension iso right heart failure ongoing transplant eval. This hospitalization she has had hypotension to low 80s (starting midodrine 4/9)  Neurology consulted 4/9/25 for headaches and vertigo.  -Headaches similar to prior but more frequent and more severe. +throbbing +photophobia . Responds to oxycodone / hydromorphone but not acetaminophen  -Vertigo is positional and paroxysmal. Responded well to meclizine 12.5mg x 1  -Neuro exam with left corrective saccade on right head impulse, only end gaze nystagmus, and no skew   She has chronic headaches for last 2 years. Headaches were holocephalic, perhaps worse posteriorly, throbb  CTH (4/7/25) unrevealing    Impression:  1) Migraine without aura, worse in setting of acute medical illness  2) Peripheral vertigo    Recommendations  #Migraine without aura  Baseline 1 episode per week, now daily and more intense while hospitalized responsive to opioids here, less so to acetaminophen   -Consider non-opioid treatment of head pain  -First line: recommend migraine medications (to be given all together at the same time if no contraindications form comorbitities): ketorolac (Toradol) 30mg IV q8h PRN (or acetaminophen 1g IV q8h PRN), metoclopramide (Reglan) 10mg q8h PRN, diphenhydramine (Benadryl) 25mg IV q8h PRN. Please repeat at least 2-3 cycles for medications to be effective.  -IV hydration, Mg 2g IV x1    #Peripheral Vertigo  -Physical therapy  -Vestibular Rehab Referral  -ENT Referral  -[email or web address] for patient education  -Check orthostatic vitals   -IV Fluids as safe/able (after orthostatic vitals) consider bolus and then maintenance fluids if no contraindications  -Meclizine PRN, she responded well to 1x dose of 12.5mg    Triston Alexandre MD  Vascular Neurology  Office: [phone]
Pt seen and examined. Agree with A/P. All relevant labs and imaging reviewed. Requires I&D of abdominal wall abscess. Hold anticoagulation.
44F w/ PMH of pHTN on Remodulin sq, sildenafil, UE DVT on Xarelto, Asthma, CHF  She is also following with transplant pulmonary and is finalizing pre-transplant evaluation.  Comes to the ED with concern with concern for abdominal abscesses. She was treated for this in the outpatient setting with doxycycline for the past 5 days without improvement.  Now being admitted for I&D and likely IV abx.  Skin lesions first started about 2 weeks ago. No fevers, chills, however local tenderness.  From the pulmonary standpoint she is clinically at her baseline and without complaints.  Agree with plan as outlined above.
Agree with fellow's assessment and plan as noted above. Pt seen  via telehealth and consulted  per note by Dr. Fajardo.

## 2025-04-10 NOTE — PROGRESS NOTE ADULT - PROBLEM SELECTOR PLAN 1
- ongoing pre-lung transplant workup  - seen by pulm transplant service   - Patient is now being considered for transplant inpatient per Dr Wise    PLAN  - Allergy consult for penicillin desensitization for post transplant treatment \  - needs cardiac MRI but needs to be transitioned to an MRI compatible pump before that can happen   - home supplemental O2 2L NC, keep sats > 92%, avoid hyperoxia - ongoing pre-lung transplant workup  - seen by pul transplant service   - Patient is now being considered for transplant inpatient per Dr Wise    PLAN  - Allergy consult - will further clarify hx w/pt tomorrow and can potentially proceed with oral amox challenge inpatient if no further hx concerning.  - needs cardiac MRI but needs to be transitioned to an MRI compatible pump before that can happen, pulm transplant is working in it   - ENT consult for vertigo - ortho vitals and OP consult   - Neuro consult for headache - prescribed headache , see note but has worked for her   - Pulm following - ongoing assessment of hypotension iso pulm HTN, midodrine increased to 20 TID   - general surgery consulted - for new fluid collection and maybe drainage?  - ID following - wanting a GS consult as above  - home supplemental O2 2L NC, keep sats > 92%, avoid hyperoxia

## 2025-04-10 NOTE — PROGRESS NOTE ADULT - ASSESSMENT
43 y/o female, PMH pHTN (on treprostinil infusion), HF (s/p PPM dual chamber 2016), chronic PE (dx 2017) on DOAC, SVT (s/p ablation 05/2020), asthma, h/o MRSA abdominal wall abscesses last 2/'25, and JULIA. was admitted to [facility] 10/13/24-10/29/24 for pre-transplant expedited workup. Sent to ED 4/2/25 from MD for abdominal abscess    CXR (4/2) Perihilar opacities, likely represents enlarged pulmonary arteries. Otherwise clear lungs.    CT A/P (4/2) Peripherally enhancing lesion in the right mid abdominal wall subjacent to diffuse skin thickening measuring 5.0 x 2.1 x 4.0 cm consistent with phlegmon/abscess. Underlying mass is not excluded. Superficial skin thickening in the left abdominal wall at the level of   the umbilicus. Underlying small abscess measures 8 mm.    s/p bedside abdominal wall abscess I&D on 4/3    #MRSA Abdominal Wall Abscess, Hypotension  --Recommend Surgery evaluation of left sided abdominal wall abscess for I&D  --Please obtain CBC and CMP; no labwork since  --Continue Linezolid 600 BID but po. 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     #Pre-Lung Transplant Evaluation  COVID19 Nucleocapsid Antibody Negative  COVID19 Rajinder Antibody Positive  HAV IgG Negative  HBVs Ab Negative  HBVsAg Negative  HBVc Ab Negative  HCV Ab Negative  HSV 1 IgG Positive  HSV 2 IgG Negative  EBV IgG Positive  CMV IgG Positive  VZV IgG Positive  Measles IgG Positive  Mumps IgG Positive  Rubella IgG Positive  Quantiferon Gold Negative  HIV Ag/Ab by CMIA Negative  Syphilis Screen Negative  Toxoplasma IgG Negative  Strongyloides Ab Negative  Trypanosoma cruzi Ab Negative  --ID clearance for lung transplant pending treatment of abdominal wall abscess    #Encounter to Vaccinate Patient  COVID19: Would benefit from COVID19 [id number] Vaccine Dose  Influenza: needs this , can be given at pulmonology office, deferring today not to give 2 reactogenic vaccines  RSV: Arexvy 3/6/25  Pneumococcal: States she had pneumococcal vaccination ~2022  HAV: Would benefit from Havrix  HBV: Heplisav Dose 1 3/6/25. Dose 2 of Heplisav prior to discharge  MMR: Rubella IgG pending. Mumps and Measles immune  Varicella: Immune will not require further vaccination  Shingles: Will require Shingrix  Tdap: Will require Tdap    I will continue to follow. Please feel free to contact me with any further questions.    Rogelio Gonsales M.D.  [facility] Division of Infectious Disease  8AM-5PM Monday - Friday: Available on [employer] Teams  After Hours and Holidays (or if no response on [employer] Teams): Please contact the Infectious Diseases Office at [phone]    The above assessment and plan were discussed with medicine resident

## 2025-04-10 NOTE — PROGRESS NOTE ADULT - PROBLEM SELECTOR PLAN 8
No BM since admission   Has been using opiods for headache management   2 BMs on 4/8    PLAN  - continue Miralax BID and apryl

## 2025-04-11 LAB
ALBUMIN SERPL ELPH-MCNC: 3.9 G/DL — SIGNIFICANT CHANGE UP (ref 3.3–5)
ALBUMIN SERPL ELPH-MCNC: 3.9 G/DL — SIGNIFICANT CHANGE UP (ref 3.3–5)
ALP SERPL-CCNC: 63 U/L — SIGNIFICANT CHANGE UP (ref 40–120)
ALP SERPL-CCNC: 64 U/L — SIGNIFICANT CHANGE UP (ref 40–120)
ALT FLD-CCNC: 12 U/L — SIGNIFICANT CHANGE UP (ref 10–45)
ALT FLD-CCNC: 9 U/L — LOW (ref 10–45)
ANION GAP SERPL CALC-SCNC: 13 MMOL/L — SIGNIFICANT CHANGE UP (ref 5–17)
ANION GAP SERPL CALC-SCNC: 15 MMOL/L — SIGNIFICANT CHANGE UP (ref 5–17)
AST SERPL-CCNC: 11 U/L — SIGNIFICANT CHANGE UP (ref 10–40)
AST SERPL-CCNC: 13 U/L — SIGNIFICANT CHANGE UP (ref 10–40)
BASOPHILS # BLD AUTO: 0.07 K/UL — SIGNIFICANT CHANGE UP (ref 0–0.2)
BASOPHILS NFR BLD AUTO: 0.7 % — SIGNIFICANT CHANGE UP (ref 0–2)
BILIRUB SERPL-MCNC: 0.8 MG/DL — SIGNIFICANT CHANGE UP (ref 0.2–1.2)
BILIRUB SERPL-MCNC: 1.1 MG/DL — SIGNIFICANT CHANGE UP (ref 0.2–1.2)
BUN SERPL-MCNC: 10 MG/DL — SIGNIFICANT CHANGE UP (ref 7–23)
BUN SERPL-MCNC: 10 MG/DL — SIGNIFICANT CHANGE UP (ref 7–23)
CALCIUM SERPL-MCNC: 8.6 MG/DL — SIGNIFICANT CHANGE UP (ref 8.4–10.5)
CALCIUM SERPL-MCNC: 8.7 MG/DL — SIGNIFICANT CHANGE UP (ref 8.4–10.5)
CHLORIDE SERPL-SCNC: 99 MMOL/L — SIGNIFICANT CHANGE UP (ref 96–108)
CHLORIDE SERPL-SCNC: 99 MMOL/L — SIGNIFICANT CHANGE UP (ref 96–108)
CO2 SERPL-SCNC: 21 MMOL/L — LOW (ref 22–31)
CO2 SERPL-SCNC: 21 MMOL/L — LOW (ref 22–31)
CREAT SERPL-MCNC: 1.09 MG/DL — SIGNIFICANT CHANGE UP (ref 0.5–1.3)
CREAT SERPL-MCNC: 1.1 MG/DL — SIGNIFICANT CHANGE UP (ref 0.5–1.3)
EGFR: 64 ML/MIN/1.73M2 — SIGNIFICANT CHANGE UP
EOSINOPHIL # BLD AUTO: 0 K/UL — SIGNIFICANT CHANGE UP (ref 0–0.5)
EOSINOPHIL NFR BLD AUTO: 0 % — SIGNIFICANT CHANGE UP (ref 0–6)
GLUCOSE SERPL-MCNC: 74 MG/DL — SIGNIFICANT CHANGE UP (ref 70–99)
GLUCOSE SERPL-MCNC: 88 MG/DL — SIGNIFICANT CHANGE UP (ref 70–99)
HCT VFR BLD CALC: 39.3 % — SIGNIFICANT CHANGE UP (ref 34.5–45)
HGB BLD-MCNC: 12.5 G/DL — SIGNIFICANT CHANGE UP (ref 11.5–15.5)
IMM GRANULOCYTES NFR BLD AUTO: 0.4 % — SIGNIFICANT CHANGE UP (ref 0–0.9)
LYMPHOCYTES # BLD AUTO: 1.86 K/UL — SIGNIFICANT CHANGE UP (ref 1–3.3)
LYMPHOCYTES # BLD AUTO: 17.4 % — SIGNIFICANT CHANGE UP (ref 13–44)
MAGNESIUM SERPL-MCNC: 2.1 MG/DL — SIGNIFICANT CHANGE UP (ref 1.6–2.6)
MCHC RBC-ENTMCNC: 22.4 PG — LOW (ref 27–34)
MCHC RBC-ENTMCNC: 31.8 G/DL — LOW (ref 32–36)
MCV RBC AUTO: 70.6 FL — LOW (ref 80–100)
MONOCYTES # BLD AUTO: 0.73 K/UL — SIGNIFICANT CHANGE UP (ref 0–0.9)
MONOCYTES NFR BLD AUTO: 6.8 % — SIGNIFICANT CHANGE UP (ref 2–14)
NEUTROPHILS # BLD AUTO: 7.96 K/UL — HIGH (ref 1.8–7.4)
NEUTROPHILS NFR BLD AUTO: 74.7 % — SIGNIFICANT CHANGE UP (ref 43–77)
NRBC BLD AUTO-RTO: 0 /100 WBCS — SIGNIFICANT CHANGE UP (ref 0–0)
PHOSPHATE SERPL-MCNC: 3.5 MG/DL — SIGNIFICANT CHANGE UP (ref 2.5–4.5)
PLATELET # BLD AUTO: 380 K/UL — SIGNIFICANT CHANGE UP (ref 150–400)
POTASSIUM SERPL-MCNC: 4.2 MMOL/L — SIGNIFICANT CHANGE UP (ref 3.5–5.3)
POTASSIUM SERPL-MCNC: 4.3 MMOL/L — SIGNIFICANT CHANGE UP (ref 3.5–5.3)
POTASSIUM SERPL-SCNC: 4.2 MMOL/L — SIGNIFICANT CHANGE UP (ref 3.5–5.3)
POTASSIUM SERPL-SCNC: 4.3 MMOL/L — SIGNIFICANT CHANGE UP (ref 3.5–5.3)
PROT SERPL-MCNC: 6.6 G/DL — SIGNIFICANT CHANGE UP (ref 6–8.3)
PROT SERPL-MCNC: 7 G/DL — SIGNIFICANT CHANGE UP (ref 6–8.3)
RBC # BLD: 5.57 M/UL — HIGH (ref 3.8–5.2)
RBC # FLD: 16.8 % — HIGH (ref 10.3–14.5)
SODIUM SERPL-SCNC: 133 MMOL/L — LOW (ref 135–145)
SODIUM SERPL-SCNC: 135 MMOL/L — SIGNIFICANT CHANGE UP (ref 135–145)
WBC # BLD: 10.66 K/UL — HIGH (ref 3.8–10.5)
WBC # FLD AUTO: 10.66 K/UL — HIGH (ref 3.8–10.5)

## 2025-04-11 PROCEDURE — 99232 SBSQ HOSP IP/OBS MODERATE 35: CPT

## 2025-04-11 PROCEDURE — 36410 VNPNXR 3YR/> PHY/QHP DX/THER: CPT

## 2025-04-11 PROCEDURE — 36410 VNPNXR 3YR/> PHY/QHP DX/THER: CPT | Mod: LT

## 2025-04-11 PROCEDURE — 99221 1ST HOSP IP/OBS SF/LOW 40: CPT

## 2025-04-11 PROCEDURE — 76937 US GUIDE VASCULAR ACCESS: CPT | Mod: 26

## 2025-04-11 PROCEDURE — 99233 SBSQ HOSP IP/OBS HIGH 50: CPT | Mod: GC

## 2025-04-11 PROCEDURE — 77001 FLUOROGUIDE FOR VEIN DEVICE: CPT | Mod: 26

## 2025-04-11 RX ORDER — FLUCONAZOLE 150 MG
150 TABLET ORAL ONCE
Refills: 0 | Status: COMPLETED | OUTPATIENT
Start: 2025-04-11 | End: 2025-04-11

## 2025-04-11 RX ORDER — KETOROLAC TROMETHAMINE 30 MG/ML
15 INJECTION, SOLUTION INTRAMUSCULAR; INTRAVENOUS ONCE
Refills: 0 | Status: DISCONTINUED | OUTPATIENT
Start: 2025-04-11 | End: 2025-04-11

## 2025-04-11 RX ORDER — DIPHENHYDRAMINE HCL 12.5MG/5ML
25 ELIXIR ORAL ONCE
Refills: 0 | Status: COMPLETED | OUTPATIENT
Start: 2025-04-11 | End: 2025-04-11

## 2025-04-11 RX ORDER — MECLIZINE HCL 12.5 MG
12.5 TABLET ORAL ONCE
Refills: 0 | Status: COMPLETED | OUTPATIENT
Start: 2025-04-11 | End: 2025-04-11

## 2025-04-11 RX ORDER — ACETAMINOPHEN 500 MG/5ML
1000 LIQUID (ML) ORAL ONCE
Refills: 0 | Status: DISCONTINUED | OUTPATIENT
Start: 2025-04-11 | End: 2025-04-13

## 2025-04-11 RX ORDER — MECLIZINE HCL 12.5 MG
12.5 TABLET ORAL ONCE
Refills: 0 | Status: DISCONTINUED | OUTPATIENT
Start: 2025-04-11 | End: 2025-04-24

## 2025-04-11 RX ORDER — METOCLOPRAMIDE HCL 10 MG
10 TABLET ORAL ONCE
Refills: 0 | Status: COMPLETED | OUTPATIENT
Start: 2025-04-11 | End: 2025-04-11

## 2025-04-11 RX ORDER — DAPTOMYCIN 500 MG/10ML
600 INJECTION, POWDER, LYOPHILIZED, FOR SOLUTION INTRAVENOUS EVERY 24 HOURS
Refills: 0 | Status: COMPLETED | OUTPATIENT
Start: 2025-04-12 | End: 2025-04-18

## 2025-04-11 RX ORDER — DAPTOMYCIN 500 MG/10ML
600 INJECTION, POWDER, LYOPHILIZED, FOR SOLUTION INTRAVENOUS ONCE
Refills: 0 | Status: COMPLETED | OUTPATIENT
Start: 2025-04-11 | End: 2025-04-11

## 2025-04-11 RX ORDER — DAPTOMYCIN 500 MG/10ML
INJECTION, POWDER, LYOPHILIZED, FOR SOLUTION INTRAVENOUS
Refills: 0 | Status: COMPLETED | OUTPATIENT
Start: 2025-04-11 | End: 2025-04-19

## 2025-04-11 RX ADMIN — LINEZOLID 600 MILLIGRAM(S): 2 INJECTION, SOLUTION INTRAVENOUS at 05:34

## 2025-04-11 RX ADMIN — Medication 400 MILLIGRAM(S): at 19:52

## 2025-04-11 RX ADMIN — DAPTOMYCIN 124 MILLIGRAM(S): 500 INJECTION, POWDER, LYOPHILIZED, FOR SOLUTION INTRAVENOUS at 17:29

## 2025-04-11 RX ADMIN — KETOROLAC TROMETHAMINE 15 MILLIGRAM(S): 30 INJECTION, SOLUTION INTRAMUSCULAR; INTRAVENOUS at 20:15

## 2025-04-11 RX ADMIN — IPRATROPIUM BROMIDE AND ALBUTEROL SULFATE 3 MILLILITER(S): .5; 2.5 SOLUTION RESPIRATORY (INHALATION) at 12:35

## 2025-04-11 RX ADMIN — MIDODRINE HYDROCHLORIDE 20 MILLIGRAM(S): 5 TABLET ORAL at 17:22

## 2025-04-11 RX ADMIN — Medication 1000 MILLIGRAM(S): at 20:15

## 2025-04-11 RX ADMIN — Medication 10 MILLIGRAM(S): at 13:14

## 2025-04-11 RX ADMIN — Medication 40 MILLIGRAM(S): at 05:33

## 2025-04-11 RX ADMIN — Medication 9 MILLIGRAM(S): at 21:03

## 2025-04-11 RX ADMIN — MIDODRINE HYDROCHLORIDE 20 MILLIGRAM(S): 5 TABLET ORAL at 12:34

## 2025-04-11 RX ADMIN — MIDODRINE HYDROCHLORIDE 20 MILLIGRAM(S): 5 TABLET ORAL at 05:33

## 2025-04-11 RX ADMIN — Medication 1 APPLICATION(S): at 12:35

## 2025-04-11 RX ADMIN — MONTELUKAST SODIUM 10 MILLIGRAM(S): 10 TABLET ORAL at 12:34

## 2025-04-11 RX ADMIN — RIVAROXABAN 15 MILLIGRAM(S): 10 TABLET, FILM COATED ORAL at 17:23

## 2025-04-11 RX ADMIN — FLUTICASONE PROPIONATE 1 SPRAY(S): 50 SPRAY, METERED NASAL at 05:37

## 2025-04-11 RX ADMIN — Medication 25 MILLIGRAM(S): at 19:52

## 2025-04-11 RX ADMIN — Medication 150 MILLIGRAM(S): at 21:04

## 2025-04-11 RX ADMIN — Medication 12.5 MILLIGRAM(S): at 14:25

## 2025-04-11 RX ADMIN — KETOROLAC TROMETHAMINE 15 MILLIGRAM(S): 30 INJECTION, SOLUTION INTRAMUSCULAR; INTRAVENOUS at 19:55

## 2025-04-11 NOTE — CONSULT NOTE ADULT - SUBJECTIVE AND OBJECTIVE BOX
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 Patient with difficult venous access, VAT team unsuccessful with placing midline at the bedside - unable to advance wire past 2-3in in RUE. Patient currently has no IV access. IR now consulted for PICC line for IV access as well as transition from SQ to IV pHTN medication.    Allergies: vancomycin (Rash)  penicillin (Rash)  adhesives (Rash)    Medications (Abx/Cardiac/Anticoagulation/Blood Products)    linezolid    Tablet: 600 milliGRAM(s) Oral (04-11 @ 05:34)  midodrine.: 10 milliGRAM(s) Oral (04-10 @ 05:12)  midodrine.: 20 milliGRAM(s) Oral (04-11 @ 05:33)  rivaroxaban: 15 milliGRAM(s) Oral (04-10 @ 18:45)    Data:  T(C): 36.4  HR: 78  BP: 90/60  RR: 18  SpO2: 99%    -WBC 10.66 / HgB 12.5 / Hct 39.3 / Plt 380  -Na 135 / Cl 99 / BUN 10 / Glucose 88  -K 4.2 / CO2 21 / Cr 1.09  -ALT 9 / Alk Phos 63 / T.Bili 0.8  -INR -- / PTT 36.8    Assessment/Plan:   45 y/o female, PMH pHTN (on treprostinil SQ pump) currently on lung transplant list, HF (s/p PPM dual chamber 2016), chronic PE (dx 2017) on DOAC, SVT (s/p ablation 05/2020), asthma, 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 Patient with difficult venous access, VAT team unsuccessful with placing midline at the bedside - unable to advance wire past 2-3in in RUE. Patient currently has no IV access. IR now consulted for PICC line for IV access as well as transition from SQ to IV pHTN medication.    - case reviewed tentatively approved for double-lumen PICC placement today 4/11 pending ID clearance  - previous BCx negative 4/2 and 4/8 however pt now with new WBC elevation in the past 2 days, require ID clearance prior to placement  - please place IR procedure order under [facility] (free text)   - STAT labs in AM (cbc,coags, bmp, T&S)  - Pt on Xarelto, last dose 4/10 18:45; no need to hold  - No need to be NPO  - d/w primary team    RAYMOND Lane-MINE  Interventional Radiology  Available on TEAMS/ IR ext. [id number]    - Non-emergent consults: Place IR consult order in [place]  - Emergent issues (pager): [facility] [phone]; [facility] [phone]; [phone]  - Scheduling questions: [facility] [phone]; [facility] [phone]  - Clinic/outpatient booking: [facility] [phone]; [facility] [phone].

## 2025-04-11 NOTE — PROGRESS NOTE ADULT - PROBLEM SELECTOR PLAN 2
Patient does run low 90s   Has been in the mid to low 80s recently   Per the patient, she does not always consistently take diuretics at home   He is prescribed Bumex and aldactone at home   Has also had headaches during these periods of hypotension     PLAN  - HF consult - holding all Diuretics, holding sildenaphil   - will hold bumex and aldactone   - midodrine 20 TID now   - oxycodone PRN 2.5 Q6 for headache

## 2025-04-11 NOTE — PROGRESS NOTE ADULT - PROBLEM SELECTOR PROBLEM 4
Asthma Topical Clindamycin Counseling: Patient counseled that this medication may cause skin irritation or allergic reactions.  In the event of skin irritation, the patient was advised to reduce the amount of the drug applied or use it less frequently.   The patient verbalized understanding of the proper use and possible adverse effects of clindamycin.  All of the patient's questions and concerns were addressed.

## 2025-04-11 NOTE — PROGRESS NOTE ADULT - SUBJECTIVE AND OBJECTIVE BOX
Nikki Felder is a 68 y.o. female.   1948    HPI:   Location: right hip  Quality: shooting, aching and sharp  Severity: 4/10  Timing: constant  Alleviating: pain medication and lying down  Aggravating: ambulating  and increased activity     States right hip is major issue, however she points to right gluteus region. Patient reports that the opioid medication still provides her good relief and allows increased activity than she would have without the opioid medication, however, it does not cover the day fully.  Would like better coverage if possible.  She denies side effects.          The following portions of the patient's history were reviewed by me and updated as appropriate: allergies, current medications, past family history, past medical history, past social history, past surgical history and problem list.    Past Medical History:   Diagnosis Date   • Acute bronchitis    • Allergic    • Allergic rhinitis    • Anxiety state    • Anxiety state     Anxiety state, unspecified      • Arthritis    • Bacterial pneumonia    • Chronic pain    • Chronic pain     Other chronic pain      • Contact dermatitis    • COPD (chronic obstructive pulmonary disease)    • Coronary arteriosclerosis    • Depressive disorder    • Depressive disorder    • Diabetes mellitus due to underlying condition with diabetic autonomic neuropathy     Diabetes mellitus due to underlying condition with diabetic autonomic (poly)neuropathy      • Drug therapy     Long-term drug therapy      • Dysuria    • Encounter for immunization    • Encounter for screening for malignant neoplasm of colon    • Essential hypertension    • Folliculitis    • Headache    • Heart murmur    • Herpes zoster    • Herpes zoster with nervous system complication    • History of screening mammography    • Hyperlipidemia    • Insomnia    • Insomnia    • Kidney stone    • Lumbar radiculopathy    • Lung nodule    • Migraine    • Migraine     Migraine, unspecified, without  Follow Up:      Interval History:    REVIEW OF SYSTEMS  [  ] ROS unobtainable because:    [  ] All other systems negative except as noted below    Constitutional:  [ ] fever [ ] chills  [ ] weight loss  [ ] weakness  Skin:  [ ] rash [ ] phlebitis	  Eyes: [ ] icterus [ ] pain  [ ] discharge	  ENMT: [ ] sore throat  [ ] thrush [ ] ulcers [ ] exudates  Respiratory: [ ] dyspnea [ ] hemoptysis [ ] cough [ ] sputum	  Cardiovascular:  [ ] chest pain [ ] palpitations [ ] edema	  Gastrointestinal:  [ ] nausea [ ] vomiting [ ] diarrhea [ ] constipation [ ] pain	  Genitourinary:  [ ] dysuria [ ] frequency [ ] hematuria [ ] discharge [ ] flank pain  [ ] incontinence  Musculoskeletal:  [ ] myalgias [ ] arthralgias [ ] arthritis  [ ] back pain  Neurological:  [ ] headache [ ] seizures  [ ] confusion/altered mental status    Allergies  vancomycin (Rash)  penicillin (Rash)  adhesives (Rash)        ANTIMICROBIALS:  DAPTOmycin IVPB    fluconAZOLE   Tablet 150 once      OTHER MEDS:  MEDICATIONS  (STANDING):  acetaminophen     Tablet .. 1000 every 6 hours PRN  acetaminophen   IVPB .. 1000 once  albuterol    90 MICROgram(s) HFA Inhaler 2 every 6 hours PRN  albuterol/ipratropium for Nebulization 3 every 6 hours PRN  buDESOnide    Inhalation Suspension 0.5 daily  influenza   Vaccine 0.5 once  melatonin 9 at bedtime PRN  midodrine. 20 three times a day  montelukast 10 daily  oxyCODONE    IR 2.5 every 6 hours PRN  pantoprazole    Tablet 40 before breakfast  polyethylene glycol 3350 17 two times a day  rivaroxaban 15 with dinner  senna 2 at bedtime      Vital Signs Last 24 Hrs  T(C): 36.2 (11 Apr 2025 15:58), Max: 36.6 (10 Apr 2025 23:55)  T(F): 97.2 (11 Apr 2025 15:58), Max: 97.9 (10 Apr 2025 23:55)  HR: 81 (11 Apr 2025 17:20) (66 - 85)  BP: 107/73 (11 Apr 2025 17:20) (83/57 - 115/66)  BP(mean): --  RR: 18 (11 Apr 2025 15:58) (18 - 18)  SpO2: 98% (11 Apr 2025 15:58) (98% - 100%)    Parameters below as of 11 Apr 2025 11:49  Patient On (Oxygen Delivery Method): nasal cannula  O2 Flow (L/min): 2      PHYSICAL EXAMINATION:  General: Alert and Awake, NAD  HEENT: PERRL, EOMI  Neck: Supple  Cardiac: RRR, No M/R/G  Resp: CTAB, No Wh/Rh/Ra  Abdomen: NBS, NT/ND, No HSM, No rigidity or guarding  MSK: No LE edema. No Calf tenderness  : No lucas  Skin: No rashes or lesions. Skin is warm and dry to the touch.   Neuro: Alert and Awake. CN 2-12 Grossly intact. Moves all four extremities spontaneously.  Psych: Calm, Pleasant, Cooperative                          12.5   10.66 )-----------( 380      ( 11 Apr 2025 09:55 )             39.3       04-11    135  |  99  |  10  ----------------------------<  88  4.2   |  21[L]  |  1.09    Ca    8.6      11 Apr 2025 09:55  Phos  3.5     04-11  Mg     2.1     04-11    TPro  6.6  /  Alb  3.9  /  TBili  0.8  /  DBili  x   /  AST  11  /  ALT  9[L]  /  AlkPhos  63  04-11      Urinalysis Basic - ( 11 Apr 2025 09:55 )    Color: x / Appearance: x / SG: x / pH: x  Gluc: 88 mg/dL / Ketone: x  / Bili: x / Urobili: x   Blood: x / Protein: x / Nitrite: x   Leuk Esterase: x / RBC: x / WBC x   Sq Epi: x / Non Sq Epi: x / Bacteria: x        MICROBIOLOGY:  v  Blood Blood  04-08-25   No growth at 72 Hours  --  --      Blood Blood  04-08-25   No growth at 72 Hours  --  --      Abscess abdominal wall  04-03-25   Moderate Methicillin Resistant Staphylococcus aureus  --  Methicillin resistant Staphylococcus aureus      Blood Blood-Peripheral  04-02-25   No growth at 5 days  --  --      Blood Blood-Peripheral  04-02-25   No growth at 5 days  --  --        Toxoplasma IgG Screen: <3.00 IU/mL (10-25-24 @ 07:14)  HIV-1 RNA Quantitative, Viral Load Log: NOT DET. lg /mL (10-22-24 @ 17:39)  CMV IgG Antibody: >10.00 U/mL (10-22-24 @ 15:11)          RADIOLOGY:    <The imaging below has been reviewed and visualized by me independently. Findings as detailed in report below> mention of intractable migraine, without mention of status migrainosus      • Migraine    • Myocardial infarction    • Nausea and vomiting    • Nausea with vomiting    • Neck pain    • Need for immunization against influenza    • Need for prophylactic vaccination and inoculation against diphtheria alone    • Need for prophylactic vaccination and inoculation against unspecified single disease     Need for prophylactic vaccination and inoculation against Streptococcus pneumoniae [pneumococcus] and influenza      • Non-alcoholic fatty liver disease    • Rectus sheath hematoma    • Senile osteoporosis    • Shoulder pain    • Solitary pulmonary nodule present on computed tomography of lung    • Spasm of back muscles    • Spinal stenosis of lumbar region    • Systolic congestive heart failure    • TIA (transient ischemic attack)    • Tobacco dependence syndrome    • Type 2 diabetes mellitus    • Urinary tract infectious disease    • Viral gastroenteritis    • Vitamin D deficiency     Unspecified vitamin D deficiency      • Vitamin D deficiency        Social History     Social History   • Marital status:      Spouse name: N/A   • Number of children: N/A   • Years of education: N/A     Occupational History   • Not on file.     Social History Main Topics   • Smoking status: Current Every Day Smoker     Packs/day: 0.50     Years: 45.00     Types: Cigarettes   • Smokeless tobacco: Never Used   • Alcohol use No   • Drug use: No   • Sexual activity: Defer     Other Topics Concern   • Not on file     Social History Narrative    Previously worked in [employer] at [employer] [facility] [employer] – [place].  Lives with daughter.        Family History   Problem Relation Age of Onset   • Alzheimer's disease Mother    • Diabetes Brother    • Alzheimer's disease Other    • Diabetes Other    • Lung cancer Other    • Pulmonary embolism Other    • Hypertension Father    • Depression Daughter    • Hypertension Daughter    • Anxiety disorder Daughter    • Lung  cancer Brother          Current Outpatient Prescriptions:   •  aspirin 81 MG tablet, Take 1 tablet by mouth Daily., Disp: 30 tablet, Rfl: 5  •  atorvastatin (LIPITOR) 20 MG tablet, Take 1 tablet by mouth Every Night., Disp: 90 tablet, Rfl: 3  •  busPIRone (BUSPAR) 15 MG tablet, Take 15 mg by mouth Every Night., Disp: , Rfl:   •  butalbital-acetaminophen-caffeine (FIORICET, ESGIC) -40 MG per tablet, Take 1 tablet by mouth Every 6 (Six) Hours As Needed for Headache., Disp: 30 tablet, Rfl: 0  •  carvedilol (COREG) 3.125 MG tablet, Take 1 tablet by mouth 2 (Two) Times a Day., Disp: 180 tablet, Rfl: 3  •  clopidogrel (PLAVIX) 75 MG tablet, Take 75 mg by mouth Daily., Disp: , Rfl:   •  escitalopram (LEXAPRO) 20 MG tablet, Take 1 tablet by mouth Daily. (Patient taking differently: Take 20 mg by mouth Every Night.), Disp: 90 tablet, Rfl: 3  •  FINGERSTIX LANCETS misc, To check sugar tid dx code E11.65, Disp: 200 each, Rfl: 5  •  gabapentin (NEURONTIN) 400 MG capsule, Take 400 mg by mouth 3 (Three) Times a Day. Pt takes 400 mg + 800 mg three times daily., Disp: , Rfl:   •  gabapentin (NEURONTIN) 800 MG tablet, Take 800 mg by mouth 3 (Three) Times a Day. Pt takes 400 mg + 800 mg three times daily., Disp: , Rfl:   •  glipiZIDE (GLUCOTROL) 5 MG tablet, Take 1 tablet by mouth 2 (Two) Times a Day Before Meals., Disp: 180 tablet, Rfl: 3  •  glucose blood test strip, To check sugar tid dx code E11.65, Disp: 100 each, Rfl: 12  •  glucose monitor monitoring kit, 1 each As Needed (to check sugar)., Disp: 1 each, Rfl: 1  •  HYDROcodone-acetaminophen (NORCO) 7.5-325 MG per tablet, Take 1 tablet by mouth 3 (three) times a day as needed for moderate pain (4-6)., Disp: , Rfl:   •  insulin detemir (LEVEMIR) 100 UNIT/ML injection, Inject 14 Units under the skin Every Night., Disp: 2 pen, Rfl: 5  •  Insulin Pen Needle 33G X 8 MM misc, 1 Units Every Night., Disp: 100 each, Rfl: 3  •  lisinopril (ZESTRIL) 5 MG tablet, Take 1 tablet by  Follow Up:  intraabdominal abscess    Interval History: Afebrile.  No acute events    REVIEW OF SYSTEMS  [  ] ROS unobtainable because:    [ x ] All other systems negative except as noted below    Constitutional:  [ ] fever [ ] chills  [ ] weight loss  [ ] weakness  Skin:  [ ] rash [ ] phlebitis +noting drainage from left sided wound (prior infusion site)  Eyes: [ ] icterus [ ] pain  [ ] discharge	  ENMT: [ ] sore throat  [ ] thrush [ ] ulcers [ ] exudates  Respiratory: [ ] dyspnea [ ] hemoptysis [ ] cough [ ] sputum	  Cardiovascular:  [ ] chest pain [ ] palpitations [ ] edema	  Gastrointestinal:  [ ] nausea [ ] vomiting [ ] diarrhea [ ] constipation [ ] pain	  Genitourinary:  [ ] dysuria [ ] frequency [ ] hematuria [ ] discharge [ ] flank pain  [ ] incontinence  Musculoskeletal:  [ ] myalgias [ ] arthralgias [ ] arthritis  [ ] back pain  Neurological:  [ ] headache [ ] seizures  [ ] confusion/altered mental status    Allergies  vancomycin (Rash)  penicillin (Rash)  adhesives (Rash)        ANTIMICROBIALS:  DAPTOmycin IVPB    fluconAZOLE   Tablet 150 once      OTHER MEDS:  MEDICATIONS  (STANDING):  acetaminophen     Tablet .. 1000 every 6 hours PRN  acetaminophen   IVPB .. 1000 once  albuterol    90 MICROgram(s) HFA Inhaler 2 every 6 hours PRN  albuterol/ipratropium for Nebulization 3 every 6 hours PRN  buDESOnide    Inhalation Suspension 0.5 daily  influenza   Vaccine 0.5 once  melatonin 9 at bedtime PRN  midodrine. 20 three times a day  montelukast 10 daily  oxyCODONE    IR 2.5 every 6 hours PRN  pantoprazole    Tablet 40 before breakfast  polyethylene glycol 3350 17 two times a day  rivaroxaban 15 with dinner  senna 2 at bedtime      Vital Signs Last 24 Hrs  T(C): 36.2 (11 Apr 2025 15:58), Max: 36.6 (10 Apr 2025 23:55)  T(F): 97.2 (11 Apr 2025 15:58), Max: 97.9 (10 Apr 2025 23:55)  HR: 81 (11 Apr 2025 17:20) (66 - 85)  BP: 107/73 (11 Apr 2025 17:20) (83/57 - 115/66)  BP(mean): --  RR: 18 (11 Apr 2025 15:58) (18 - 18)  SpO2: 98% (11 Apr 2025 15:58) (98% - 100%)    Parameters below as of 11 Apr 2025 11:49  Patient On (Oxygen Delivery Method): nasal cannula  O2 Flow (L/min): 2      PHYSICAL EXAMINATION:  General: Alert and Awake, NAD  HEENT: Normocephalic / Atraumatic  Resp: No accessory muscles of respiration utilized  Abdomen: +left sided prior infusion pump site with hemopurulent drainage. Dressing over right sided abdominal wall abscess NBS, No HSM, No rigidity or guarding  MSK: No LE edema.   : No lucas  Skin: No rashes or lesions.    Neuro: Alert and Awake. CN 2-12 Grossly intact. Moves all four extremities spontaneously.  Psych: Calm, Pleasant, Cooperative                          12.5   10.66 )-----------( 380      ( 11 Apr 2025 09:55 )             39.3       04-11    135  |  99  |  10  ----------------------------<  88  4.2   |  21[L]  |  1.09    Ca    8.6      11 Apr 2025 09:55  Phos  3.5     04-11  Mg     2.1     04-11    TPro  6.6  /  Alb  3.9  /  TBili  0.8  /  DBili  x   /  AST  11  /  ALT  9[L]  /  AlkPhos  63  04-11      Urinalysis Basic - ( 11 Apr 2025 09:55 )    Color: x / Appearance: x / SG: x / pH: x  Gluc: 88 mg/dL / Ketone: x  / Bili: x / Urobili: x   Blood: x / Protein: x / Nitrite: x   Leuk Esterase: x / RBC: x / WBC x   Sq Epi: x / Non Sq Epi: x / Bacteria: x        MICROBIOLOGY:  v  Blood Blood  04-08-25   No growth at 72 Hours  --  --      Blood Blood  04-08-25   No growth at 72 Hours  --  --      Abscess abdominal wall  04-03-25   Moderate Methicillin Resistant Staphylococcus aureus  --  Methicillin resistant Staphylococcus aureus      Blood Blood-Peripheral  04-02-25   No growth at 5 days  --  --      Blood Blood-Peripheral  04-02-25   No growth at 5 days  --  --        Toxoplasma IgG Screen: <3.00 IU/mL (10-25-24 @ 07:14)  HIV-1 RNA Quantitative, Viral Load Log: NOT DET. lg /mL (10-22-24 @ 17:39)  CMV IgG Antibody: >10.00 U/mL (10-22-24 @ 15:11)          RADIOLOGY:    <The imaging below has been reviewed and visualized by me independently. Findings as detailed in report below>    < from: US Abdomen Limited (04.10.25 @ 20:44) >  IMPRESSION:  1.7 cm complex collection noted in the subcutaneous tissue at the   patient's site concern with hypervascularity, abscess cannot be excluded.    < end of copied text >   mouth Daily. (Patient taking differently: Take 5 mg by mouth Every Night.), Disp: 30 tablet, Rfl: 2  •  nitroglycerin (NITRODUR) 0.2 MG/HR patch, Place 1 patch on the skin Daily., Disp: 90 patch, Rfl: 12  •  HYDROcodone-acetaminophen (NORCO) 7.5-325 MG per tablet, Take 1 tablet by mouth 4 (Four) Times a Day for 30 days., Disp: 120 tablet, Rfl: 0  •  HYDROcodone-acetaminophen (NORCO) 7.5-325 MG per tablet, Take 1 tablet by mouth 4 (Four) Times a Day for 30 days., Disp: 120 tablet, Rfl: 0    No Known Allergies    Review of Systems   Musculoskeletal:        Right hip pain   All other systems reviewed and are negative.    All systems reviewed and negative except for above.    Physical Exam   Constitutional: She appears well-developed and well-nourished. No distress.   Musculoskeletal:        Lumbar back: She exhibits decreased range of motion (r lumbar facet loading with ext limited to approx 10 deg due to pain.  flexoin 60 deg).   Neurological: She is alert.   Psychiatric: She has a normal mood and affect. Her behavior is normal. Judgment normal.       Nikki was seen today for hip pain.    Diagnoses and all orders for this visit:    Lumbosacral spondylosis without myelopathy  -     ToxASSURE Select 13 (MW)    Sacroiliitis  -     ToxASSURE Select 13 (MW)    Lumbar stenosis  -     ToxASSURE Select 13 (MW)    High risk medications (not anticoagulants) long-term use  -     ToxASSURE Select 13 (MW)    Other orders  -     HYDROcodone-acetaminophen (NORCO) 7.5-325 MG per tablet; Take 1 tablet by mouth 4 (Four) Times a Day for 30 days.  -     HYDROcodone-acetaminophen (NORCO) 7.5-325 MG per tablet; Take 1 tablet by mouth 4 (Four) Times a Day for 30 days.        Medication: Patient reports no negative side effects, Patient reports appropriate usage and storage habits, Patient's opioid provides enough reflief to be more active and perform activities of daily living with less discomfort. and Refill opioid medication as above.   This is an increase to gain coverage.     Interventional: none at this time.  Could be interventional candidate, however is on plavix for cardiac stent <1 year ago.     Rehab: none at this time    Behavioral: No aberrant behavior noted. PRUDENCE Report #[id number]  was reviewed and is consistent with stated history    Urine drug screen Ordered today to test for drugs of abuse and prescribed medications          This document has been electronically signed by Yan Melendez MD on June 22, 2017 9:50 AM

## 2025-04-11 NOTE — CHART NOTE - NSCHARTNOTEFT_GEN_A_CORE
I was asked to comment on clearance for PICC line placement.  Patient has new abdominal wall abscess with uptrending white blood cell count and continued relative hypotension.  At this point cannot completely exclude that patient may have positive blood cultures.  However, patient does not have intravenous access at this time and given this, benefits of placing midline (As a temporary form of intravenous access) would outweigh risks.  In the interim I will switch patient's linezolid to daptomycin for coverage of MRSA    I will continue to follow. Please feel free to contact me with any further questions.    Rogelio Gonsales M.D.  [facility] Division of Infectious Disease  8AM-5PM Monday - Friday: Available on [employer] Teams  After Hours and Holidays (or if no response on [employer] Teams): Please contact the Infectious Diseases Office at [phone]

## 2025-04-11 NOTE — CONSULT NOTE ADULT - CONSULT REASON
penicillin allergy
vertigo
Dizziness and headache
PICC placement
eval abdominal wall abscess
pulmonary hypertension
MRSA Abdominal Wall Abscess
Patient undergoing lung transplant evaluation
hypotension

## 2025-04-11 NOTE — PROGRESS NOTE ADULT - SUBJECTIVE AND OBJECTIVE BOX
Pulmonary Consult Follow up     Interval Events:    Still remains hypotensive despite midodrine 20mg tid, but without dizziness now.  Left sided abdominal wall also with collection - needs another I&D     Unable to get fluid boluses due to IV access issues.     REVIEW OF SYSTEMS:  [x] All other systems negative except per HPI   [ ] Unable to assess ROS because ________    OBJECTIVE:  ICU Vital Signs Last 24 Hrs  T(C): 36.2 (11 Apr 2025 15:58), Max: 36.6 (10 Apr 2025 23:55)  T(F): 97.2 (11 Apr 2025 15:58), Max: 97.9 (10 Apr 2025 23:55)  HR: 81 (11 Apr 2025 17:20) (66 - 85)  BP: 107/73 (11 Apr 2025 17:20) (83/57 - 115/66)  BP(mean): --  ABP: --  ABP(mean): --  RR: 18 (11 Apr 2025 15:58) (18 - 18)  SpO2: 98% (11 Apr 2025 15:58) (98% - 100%)    O2 Parameters below as of 11 Apr 2025 11:49  Patient On (Oxygen Delivery Method): nasal cannula  O2 Flow (L/min): 2            04-10 @ 07:01  -  04-11 @ 07:00  --------------------------------------------------------  IN: 480 mL / OUT: 0 mL / NET: 480 mL        PHYSICAL EXAM:  GENERAL: NAD, well-groomed, well-developed  HEAD:  Atraumatic, Normocephalic  EYES: EOMI, conjunctiva and sclera clear  ENMT: Moist mucous membranes  CHEST/LUNG: Clear to auscultation bilaterally   HEART: Regular rate and rhythm   ABDOMEN: Nondistended  VASCULAR: No  cyanosis, or edema  SKIN: No rashes or lesions  NERVOUS SYSTEM:  Alert & Oriented X3, Good concentration    HOSPITAL MEDICATIONS:  MEDICATIONS  (STANDING):  acetaminophen   IVPB .. 1000 milliGRAM(s) IV Intermittent once  buDESOnide    Inhalation Suspension 0.5 milliGRAM(s) Inhalation daily  chlorhexidine 2% Cloths 1 Application(s) Topical daily  DAPTOmycin IVPB      fluconAZOLE   Tablet 150 milliGRAM(s) Oral once  fluticasone propionate 50 MICROgram(s)/spray Nasal Spray 1 Spray(s) Both Nostrils two times a day  influenza   Vaccine 0.5 milliLiter(s) IntraMuscular once  lidocaine   4% Patch 1 Patch Transdermal daily  lidocaine 1%/epinephrine 1:100,000 Inj 20 milliLiter(s) Local Injection once  midodrine. 20 milliGRAM(s) Oral three times a day  montelukast 10 milliGRAM(s) Oral daily  pantoprazole    Tablet 40 milliGRAM(s) Oral before breakfast  polyethylene glycol 3350 17 Gram(s) Oral two times a day  remodulin (treprostinil) SubQ infusion 1 Dose(s) 1 Dose(s) SubCutaneous Continuous Pump  rivaroxaban 15 milliGRAM(s) Oral with dinner  senna 2 Tablet(s) Oral at bedtime    MEDICATIONS  (PRN):  acetaminophen     Tablet .. 1000 milliGRAM(s) Oral every 6 hours PRN Mild Pain (1 - 3), Moderate Pain (4 - 6)  albuterol    90 MICROgram(s) HFA Inhaler 2 Puff(s) Inhalation every 6 hours PRN Shortness of Breath  albuterol/ipratropium for Nebulization 3 milliLiter(s) Nebulizer every 6 hours PRN Shortness of Breath  melatonin 9 milliGRAM(s) Oral at bedtime PRN Insomnia  oxyCODONE    IR 2.5 milliGRAM(s) Oral every 6 hours PRN Severe Pain (7 - 10)      LABS:  04-11    135  |  99  |  10  ----------------------------<  88  4.2   |  21[L]  |  1.09  04-10    133[L]  |  99  |  8   ----------------------------<  85  4.4   |  22  |  1.01    Ca    8.6      11 Apr 2025 09:55  Ca    8.7      10 Apr 2025 14:05  Phos  3.5     04-11  Mg     2.1     04-11    TPro  6.6  /  Alb  3.9  /  TBili  0.8  /  DBili  x   /  AST  11  /  ALT  9[L]  /  AlkPhos  63  04-11  TPro  7.1  /  Alb  3.9  /  TBili  0.9  /  DBili  x   /  AST  9[L]  /  ALT  5[L]  /  AlkPhos  63  04-10    Magnesium: 2.1 mg/dL (04-11-25 @ 09:55)  Magnesium: 2.0 mg/dL (04-10-25 @ 14:05)    Phosphorus: 3.5 mg/dL (04-11-25 @ 09:55)  Phosphorus: 2.7 mg/dL (04-10-25 @ 14:05)                    Urinalysis Basic - ( 11 Apr 2025 09:55 )    Color: x / Appearance: x / SG: x / pH: x  Gluc: 88 mg/dL / Ketone: x  / Bili: x / Urobili: x   Blood: x / Protein: x / Nitrite: x   Leuk Esterase: x / RBC: x / WBC x   Sq Epi: x / Non Sq Epi: x / Bacteria: x                              12.5   10.66 )-----------( 380      ( 11 Apr 2025 09:55 )             39.3                         12.5   10.67 )-----------( 429      ( 10 Apr 2025 14:05 )             39.9     CAPILLARY BLOOD GLUCOSE

## 2025-04-11 NOTE — PRE PROCEDURE NOTE - PRE PROCEDURE EVALUATION
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 Patient with difficult venous access, VAT team unsuccessful with placing midline at the bedside - unable to advance wire past 2-3in in RUE. Patient currently has no IV access. IR now consulted for PICC line for IV access as well as transition from SQ to IV pHTN medication.    Allergies: vancomycin (Rash)  penicillin (Rash)  adhesives (Rash)    Medications (Abx/Cardiac/Anticoagulation/Blood Products)    linezolid    Tablet: 600 milliGRAM(s) Oral (04-11 @ 05:34)  midodrine.: 10 milliGRAM(s) Oral (04-10 @ 05:12)  midodrine.: 20 milliGRAM(s) Oral (04-11 @ 12:34)  rivaroxaban: 15 milliGRAM(s) Oral (04-10 @ 18:45)    Data:    T(C): 36.4  HR: 67  BP: 100/70  RR: 18  SpO2: 100%    Exam  General: No acute distress  Chest: Non labored breathing  Abdomen: Non-distended  Extremities: No swelling, warm    -WBC 10.66 / HgB 12.5 / Hct 39.3 / Plt 380  -Na 135 / Cl 99 / BUN 10 / Glucose 88  -K 4.2 / CO2 21 / Cr 1.09  -ALT 9 / Alk Phos 63 / T.Bili 0.8    Imaging:     Plan: 44y Female presents for midline placement. Will hold off on PICC iso elevated WBC.    -Risks/Benefits/alternatives explained with the patient and/or healthcare proxy and witnessed informed consent obtained.

## 2025-04-11 NOTE — PROGRESS NOTE ADULT - SUBJECTIVE AND OBJECTIVE BOX
Surgery Progress Note    S: Patient seen and examined. No acute events overnight. Denies fevers, chills or abdominal pain.    O:  Physical Exam:  Gen: Laying in bed, NAD  Resp: Unlabored breathing  Abd: soft, non-distended, non-tender, ~2cm soft mass at LLQ, nontender to touch, no skin opening, no drainage  Ext: Moves 4 extremities spontaneously    Vital Signs Last 24 Hrs  T(C): 36.4 (11 Apr 2025 04:55), Max: 36.6 (10 Apr 2025 23:55)  T(F): 97.5 (11 Apr 2025 04:55), Max: 97.9 (10 Apr 2025 23:55)  HR: 78 (11 Apr 2025 05:45) (78 - 85)  BP: 90/60 (11 Apr 2025 05:45) (83/57 - 90/60)  BP(mean): --  RR: 18 (11 Apr 2025 04:55) (18 - 18)  SpO2: 99% (11 Apr 2025 04:55) (98% - 99%)    Parameters below as of 11 Apr 2025 04:55  Patient On (Oxygen Delivery Method): nasal cannula  O2 Flow (L/min): 2      I&O's Detail    10 Apr 2025 07:01  -  11 Apr 2025 07:00  --------------------------------------------------------  IN:    Oral Fluid: 480 mL  Total IN: 480 mL    OUT:  Total OUT: 0 mL    Total NET: 480 mL                                12.5   10.66 )-----------( 380      ( 11 Apr 2025 09:55 )             39.3       04-11    135  |  99  |  10  ----------------------------<  88  4.2   |  21[L]  |  1.09    Ca    8.6      11 Apr 2025 09:55  Phos  3.5     04-11  Mg     2.1     04-11    TPro  6.6  /  Alb  3.9  /  TBili  0.8  /  DBili  x   /  AST  11  /  ALT  9[L]  /  AlkPhos  63  04-11

## 2025-04-11 NOTE — CONSULT NOTE ADULT - CONSULT REQUESTED DATE/TIME
02-Apr-2025 19:11
11-Apr-2025 11:32
08-Apr-2025 17:44
10-Apr-2025 17:00
02-Apr-2025 14:00
03-Apr-2025 11:06
09-Apr-2025 16:23
09-Apr-2025 21:54
02-Apr-2025 17:53

## 2025-04-11 NOTE — PROGRESS NOTE ADULT - ASSESSMENT
44F w/ PMH of pHTN on Remodulin sq, sildenafil, UE DVT on Xarelto, Asthma, CHF  She is also following with transplant pulmonary and is finalizing pre-transplant evaluation.  Comes to the ED with concern with concern for abdominal abscesses. She was treated for this in the outpatient setting with doxycycline for the past 5 days without improvement.  Now being admitted for I&D and likely IV abx.  Skin lesions first started about 2 weeks ago. No fevers, chills, however local tenderness.    # pHTN  Patient has a known history of PAH on sildenfail, remodulin, diuretics. Also on O2 at home, 2-3lpm.   - hold sildenafil 20mg TID  - continue home Remodulin 51ng/kg/min on home pump  - monitor K, Na, Mg  - I&D performed on 4/3, cultures with MRSA  - in case of major surgery plans, please let pulmonary know, as patient might need adjustment to these medications  - Please hold diuretics given hypotension and dizziness. Pt appears euvolemic.  - Please hold sildenafil for now  - Please given 250cc of LR and repeat orthostatics.    - Needs access - pt to get access with IR   - Will need cardiac MRI once she is more stable (ideally monday or tuesday prior to thursday selection meeting)   - MICU evaluation    # asthma  Patient with known asthma, last received Fasenra on 4/1. PFTs from 1/2025 w/ severe obstruction, low DLCO.  - continue home equivalent pulmicort, spiriva, PRN atrovent nebs  - continue flonase

## 2025-04-11 NOTE — PROGRESS NOTE ADULT - ASSESSMENT
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 This hospitalization she has had hypotension to low 80s (starting midodrine 4/9)  Neurology consulted 4/9/25 for headaches and vertigo.  -Headaches similar to prior but more frequent and more severe. +throbbing +photophobia . Responds to oxycodone / hydromorphone but not acetaminophen  -Vertigo is positional and paroxysmal. Responded well to meclizine 12.5mg x 1  -Neuro exam with left corrective saccade on right head impulse, only end gaze nystagmus, and no skew   She has chronic headaches for last 2 years. Headaches were holocephalic, perhaps worse posteriorly, throbb  CTH (4/7/25) unrevealing    Impression:  1) Migraine without aura, worse in setting of acute medical illness; imporved   2) Peripheral vertigo; duarte r    Recommendations  #Migraine without aura  Baseline 1 episode per week, now daily and more intense while hospitalized responsive to opioids here, less so to acetaminophen   -Consider non-opioid treatment of head pain  -First line: recommend migraine medications (to be given all together at the same time if no contraindications form comorbitities): ketorolac (Toradol) 30mg IV q8h PRN (or acetaminophen 1g IV q8h PRN), metoclopramide (Reglan) 10mg q8h PRN, diphenhydramine (Benadryl) 25mg IV q8h PRN. Please repeat at least 2-3 cycles for medications to be effective.  -IV hydration, Mg 2g IV x1    #Peripheral Vertigo  -Physical therapy  -Vestibular Rehab Referral  -ENT Referral  -[email or web address] for patient education  -Check orthostatic vitals   -IV Fluids as safe/able (after orthostatic vitals) consider bolus and then maintenance fluids if no contraindications  -Meclizine PRN, she responded well to 1x dose of 12.5mg    Triston Alexandre MD  Vascular Neurology  Office: [phone] .

## 2025-04-11 NOTE — PROGRESS NOTE ADULT - ASSESSMENT
43 y/o female, PMH pHTN (on treprostinil infusion), HF (s/p PPM dual chamber 2016), chronic PE (dx 2017) on DOAC, SVT (s/p ablation 05/2020), asthma, h/o MRSA abdominal wall abscesses last 2/'25, and JULIA. was admitted to [facility] 10/13/24-10/29/24 for pre-transplant expedited workup. Sent to ED 4/2/25 from MD for abdominal abscess    CXR (4/2) Perihilar opacities, likely represents enlarged pulmonary arteries. Otherwise clear lungs.    CT A/P (4/2) Peripherally enhancing lesion in the right mid abdominal wall subjacent to diffuse skin thickening measuring 5.0 x 2.1 x 4.0 cm consistent with phlegmon/abscess. Underlying mass is not excluded. Superficial skin thickening in the left abdominal wall at the level of   the umbilicus. Underlying small abscess measures 8 mm.    s/p bedside abdominal wall abscess I&D on 4/3    #MRSA Abdominal Wall Abscess, Hypotension  --General surgery without plans for incision and drainage of the left-sided abdominal wall fluid collection  --To optimize treatment for MRSA we will switch linezolid to daptomycin 600 mg IV every 24 hours.  Would check 2 sets of blood cultures today  --I was asked to comment on clearance for PICC line placement.  Patient has new abdominal wall abscess with uptrending white blood cell count and continued relative hypotension.  At this point cannot completely exclude that patient may have positive blood cultures.  However, patient does not have intravenous access at this time and given this, benefits of placing midline (As a temporary form of intravenous access) would outweigh risks.  In the interim I will switch patient's linezolid to daptomycin for coverage of MRSA      #Pre-Lung Transplant Evaluation  COVID19 Nucleocapsid Antibody Negative  COVID19 Rajinder Antibody Positive  HAV IgG Negative  HBVs Ab Negative  HBVsAg Negative  HBVc Ab Negative  HCV Ab Negative  HSV 1 IgG Positive  HSV 2 IgG Negative  EBV IgG Positive  CMV IgG Positive  VZV IgG Positive  Measles IgG Positive  Mumps IgG Positive  Rubella IgG Positive  Quantiferon Gold Negative  HIV Ag/Ab by CMIA Negative  Syphilis Screen Negative  Toxoplasma IgG Negative  Strongyloides Ab Negative  Trypanosoma cruzi Ab Negative  --ID clearance for lung transplant pending treatment of abdominal wall abscess    #Encounter to Vaccinate Patient  COVID19: Would benefit from COVID19 8412-3202 Vaccine Dose  Influenza: needs this , can be given at pulmonology office, deferring today not to give 2 reactogenic vaccines  RSV: Arexvy 3/6/25  Pneumococcal: States she had pneumococcal vaccination ~2022  HAV: Would benefit from Havrix  HBV: Heplisav Dose 1 3/6/25. Dose 2 of Heplisav prior to discharge  MMR: Rubella IgG pending. Mumps and Measles immune  Varicella: Immune will not require further vaccination  Shingles: Will require Shingrix  Tdap: Will require Tdap    I will continue to follow. Please feel free to contact me with any further questions.    Rogelio Gonsales M.D.  [facility] Division of Infectious Disease  8AM-5PM Monday - Friday: Available on [employer] Teams  After Hours and Holidays (or if no response on [employer] Teams): Please contact the Infectious Diseases Office at [phone]

## 2025-04-11 NOTE — PROGRESS NOTE ADULT - PROBLEM SELECTOR PLAN 9
dvt - home Xeralto  Diet - regular   dispo - pending

## 2025-04-11 NOTE — PROGRESS NOTE ADULT - ATTENDING COMMENTS
45 yo F h/o pHTN on treprostinil infusion awaiting lung transplant with prior MRSA abdominal wall abscesses now with induration at LLQ at previous pump site.      No fevers   No pain at site    She does not think this feels like her prior infections      Abdomen soft, RLQ I+D site with packing, LLQ pump site with some induration, no erythema, no warmth, no fluctuance      WBC 10.6      US abd    1.6 x 1.4 x 1.7cm collection in subcu space      Given no signs of infection, recommend no intervention at this point   Fluid on US may be seroma or even medication from pump    If change in exam or concern for infection, please reconsult      I have seen and examined this patient with the resident housestaff. I have reviewed all labs, imaging and reports. I have participated in formulating the plan, and have read and agree with the history, ROS, exam, assessment and plan as stated above.      Total time spent in the care of this patient today (excluding critical care, teaching, & procedures): 40 minutes                   Over 50% of the total time was spent on counseling and coordination of care.     Bailee Reed MD   Trauma and Acute Care Surgery

## 2025-04-11 NOTE — PROGRESS NOTE ADULT - ATTENDING COMMENTS
44F w/ PMH of pHTN (on Remodulin sq and sildenafil), UE DVT on Xarelto, Asthma and CHF p/w abd wall abscess s/p drainage.    Cont deny fever. abdominal pain resolved    Seen by Neuro and ENT appears to be peripheral vertigo  Vertigo and LH sx resolved      Feels better but still w/ orthostatic changes and hypotensive despite midodrine   wbc remains slightly increased         - cont to hold diuresis today and monitor sx  - planned for small fluid challenge today given pocus findings  - cont midodrine but hold if sbp>100, decrease dose as tolerated  - cont hold sildenafil today, reinitiate when able  -  cont remodulin  - depending on clinical course may need flolan transition  - f/u inflammatory markers - all negative and bcx ngtd however as LLQ collection which surgery cannot I+D  - recall Surgery for poss I+D if has any changes in LLQ fluid collection  - on linezolid, f/u ID reccs- now changed to daptomycin  - cont NC to goal sat approx 95%  -cont ICS and BD tx (on fasenra as outpt)  - f/u Transplant Pulm reccs   - on a/c for VTE hx    - will need further w/u including cardiac mri (likely on Monday) and RHC this admission once stable    d/w Phtn team  outpt f/u with Transplant Pulm and Phtn clinic

## 2025-04-11 NOTE — PROGRESS NOTE ADULT - SUBJECTIVE AND OBJECTIVE BOX
PROGRESS NOTE:   Authored by Dr. Rivera Carter MD     Patient is a 44y old  Female who presents with a chief complaint of abscess (10 Apr 2025 19:07)      SUBJECTIVE / OVERNIGHT EVENTS:  No acute events overnight.     MEDICATIONS  (STANDING):  acetaminophen   IVPB .. 1000 milliGRAM(s) IV Intermittent once  buDESOnide    Inhalation Suspension 0.5 milliGRAM(s) Inhalation daily  chlorhexidine 2% Cloths 1 Application(s) Topical daily  fluticasone propionate 50 MICROgram(s)/spray Nasal Spray 1 Spray(s) Both Nostrils two times a day  influenza   Vaccine 0.5 milliLiter(s) IntraMuscular once  lidocaine   4% Patch 1 Patch Transdermal daily  lidocaine 1%/epinephrine 1:100,000 Inj 20 milliLiter(s) Local Injection once  linezolid    Tablet 600 milliGRAM(s) Oral every 12 hours  midodrine. 20 milliGRAM(s) Oral three times a day  montelukast 10 milliGRAM(s) Oral daily  pantoprazole    Tablet 40 milliGRAM(s) Oral before breakfast  polyethylene glycol 3350 17 Gram(s) Oral two times a day  remodulin (treprostinil) SubQ infusion 1 Dose(s) 1 Dose(s) SubCutaneous Continuous Pump  rivaroxaban 15 milliGRAM(s) Oral with dinner  senna 2 Tablet(s) Oral at bedtime    MEDICATIONS  (PRN):  acetaminophen     Tablet .. 1000 milliGRAM(s) Oral every 6 hours PRN Mild Pain (1 - 3), Moderate Pain (4 - 6)  albuterol    90 MICROgram(s) HFA Inhaler 2 Puff(s) Inhalation every 6 hours PRN Shortness of Breath  albuterol/ipratropium for Nebulization 3 milliLiter(s) Nebulizer every 6 hours PRN Shortness of Breath  melatonin 9 milliGRAM(s) Oral at bedtime PRN Insomnia  oxyCODONE    IR 2.5 milliGRAM(s) Oral every 6 hours PRN Severe Pain (7 - 10)      CAPILLARY BLOOD GLUCOSE        I&O's Summary    10 Apr 2025 07:01  -  11 Apr 2025 07:00  --------------------------------------------------------  IN: 480 mL / OUT: 0 mL / NET: 480 mL        PHYSICAL EXAM:  Vital Signs Last 24 Hrs  T(C): 36.4 (11 Apr 2025 04:55), Max: 36.7 (10 Apr 2025 10:53)  T(F): 97.5 (11 Apr 2025 04:55), Max: 98 (10 Apr 2025 10:53)  HR: 78 (11 Apr 2025 05:45) (78 - 92)  BP: 90/60 (11 Apr 2025 05:45) (83/57 - 111/71)  BP(mean): --  RR: 18 (11 Apr 2025 04:55) (18 - 18)  SpO2: 99% (11 Apr 2025 04:55) (98% - 99%)    Parameters below as of 11 Apr 2025 04:55  Patient On (Oxygen Delivery Method): nasal cannula  O2 Flow (L/min): 2      CONSTITUTIONAL: NAD  HEET: MMM, EOMI, PERRLA  NECK: supple  RESPIRATORY: Normal respiratory effort; lungs are clear to auscultation bilaterally  CARDIOVASCULAR: Regular rate and rhythm, normal S1 and S2, no murmur/rub/gallop; No lower extremity edema; Peripheral pulses are 2+ bilaterally  ABDOMEN: Nontender to palpation, normoactive bowel sounds, no rebound/guarding; No hepatosplenomegaly  MUSCULOSKELETAL: no clubbing or cyanosis of digits; no joint swelling or tenderness to palpation  PSYCH: A+O to person, place, and time; affect appropriate  SKIN: No rash    LABS:                        12.5   10.67 )-----------( 429      ( 10 Apr 2025 14:05 )             39.9     04-10    133[L]  |  99  |  8   ----------------------------<  85  4.4   |  22  |  1.01    Ca    8.7      10 Apr 2025 14:05  Phos  2.7     04-10  Mg     2.0     04-10    TPro  7.1  /  Alb  3.9  /  TBili  0.9  /  DBili  x   /  AST  9[L]  /  ALT  5[L]  /  AlkPhos  63  04-10          Urinalysis Basic - ( 10 Apr 2025 14:05 )    Color: x / Appearance: x / SG: x / pH: x  Gluc: 85 mg/dL / Ketone: x  / Bili: x / Urobili: x   Blood: x / Protein: x / Nitrite: x   Leuk Esterase: x / RBC: x / WBC x   Sq Epi: x / Non Sq Epi: x / Bacteria: x        Culture - Blood (collected 08 Apr 2025 13:00)  Source: Blood Blood  Preliminary Report (10 Apr 2025 18:01):    No growth at 48 Hours    Culture - Blood (collected 08 Apr 2025 12:55)  Source: Blood Blood  Preliminary Report (10 Apr 2025 18:01):    No growth at 48 Hours        Tele Reviewed:    RADIOLOGY & ADDITIONAL TESTS:  Results Reviewed:   Imaging Personally Reviewed:  Electrocardiogram Personally Reviewed:

## 2025-04-11 NOTE — CONSULT NOTE ADULT - CONSULT REQUESTED BY NAME
Dr Yoon
Medicine
lung transplant eval
Rivera Carter
Dr Jv Teresa
medicine
Medicine
primary
Medicine/Transplant

## 2025-04-11 NOTE — ADVANCED PRACTICE NURSE CONSULT - REASON FOR CONSULT
Midline Catheter Insertion Note    Catheter type: 4F  : Bard  Power Injectable: Yes  Ref#: [id number]  Procedure assisted by: Ayaz Lambert RN    Time out was preformed, confirming the patient's first and last name, date of birth, procedure, and correct site prior to state of procedure.  Patient was placed with HOB 30 degrees. Patient placement site was prepped with chlorhexidine solution, then draped using maximum sterile barrier protection. Using the Bard Site Rite 8, the catheter was attempted via the Modified Seldinger Technique. The area was injected with 2 ml of 1% lidocaine. Strict adherence to outline aseptic technique including handwashing, glove and gown, utilizing mask and cap, plus draping the patient with a sterile drape was observed. Unable to place MIDLINE at this time, referred to IR. Pt tolerated procedure well.   All materials used for catheter insertion attempt, including the intact guide wires, were accounted for at the end of the procedure.    Number of attempts: 2  Complications/Comments: unable to attempt L Cephalic (disappears), unable to thread guidewire through L Brachial and L Basilic veins; referred to IR.  Emergency Placement: no    Site: new site

## 2025-04-11 NOTE — PROGRESS NOTE ADULT - SUBJECTIVE AND OBJECTIVE BOX
Neurology Progress Note    S: Patient seen and examined. No new events overnight. patient denied CP, SOB, HA or pain.     Medication:  acetaminophen     Tablet .. 1000 milliGRAM(s) Oral every 6 hours PRN  acetaminophen   IVPB .. 1000 milliGRAM(s) IV Intermittent once  albuterol    90 MICROgram(s) HFA Inhaler 2 Puff(s) Inhalation every 6 hours PRN  albuterol/ipratropium for Nebulization 3 milliLiter(s) Nebulizer every 6 hours PRN  buDESOnide    Inhalation Suspension 0.5 milliGRAM(s) Inhalation daily  chlorhexidine 2% Cloths 1 Application(s) Topical daily  fluticasone propionate 50 MICROgram(s)/spray Nasal Spray 1 Spray(s) Both Nostrils two times a day  influenza   Vaccine 0.5 milliLiter(s) IntraMuscular once  lidocaine   4% Patch 1 Patch Transdermal daily  lidocaine 1%/epinephrine 1:100,000 Inj 20 milliLiter(s) Local Injection once  linezolid    Tablet 600 milliGRAM(s) Oral every 12 hours  melatonin 9 milliGRAM(s) Oral at bedtime PRN  midodrine. 20 milliGRAM(s) Oral three times a day  montelukast 10 milliGRAM(s) Oral daily  oxyCODONE    IR 2.5 milliGRAM(s) Oral every 6 hours PRN  pantoprazole    Tablet 40 milliGRAM(s) Oral before breakfast  polyethylene glycol 3350 17 Gram(s) Oral two times a day  remodulin (treprostinil) SubQ infusion 1 Dose(s) 1 Dose(s) SubCutaneous Continuous Pump  rivaroxaban 15 milliGRAM(s) Oral with dinner  senna 2 Tablet(s) Oral at bedtime      Vitals:  Vital Signs Last 24 Hrs  T(C): 36.4 (11 Apr 2025 04:55), Max: 36.6 (10 Apr 2025 23:55)  T(F): 97.5 (11 Apr 2025 04:55), Max: 97.9 (10 Apr 2025 23:55)  HR: 78 (11 Apr 2025 05:45) (78 - 85)  BP: 90/60 (11 Apr 2025 05:45) (83/57 - 90/60)  BP(mean): --  RR: 18 (11 Apr 2025 04:55) (18 - 18)  SpO2: 99% (11 Apr 2025 04:55) (98% - 99%)    Parameters below as of 11 Apr 2025 04:55  Patient On (Oxygen Delivery Method): nasal cannula  O2 Flow (L/min): 2      General Exam:   General Appearance: Appropriately dressed and in no acute distress       Head: Normocephalic, atraumatic and no dysmorphic features  Ear, Nose, and Throat: Moist mucous membranes  CVS: S1S2+  Resp: No SOB, no wheeze or rhonchi  Abd: soft NTND  Extremities: No edema, no cyanosis  Skin: No bruises, no rashes     Neurological Exam:    level of consciousness: Awake, alert  Orientation: Oriented to person, age, place, and time  Language: Speech is fluent with intact naming, repetition, and ability to follow commands (including simple commands and complex cross commands)  Cortical Signs:  no hemineglect.   Content: Good overall fund of knowledge (aware of current events,and relevant past history)    HINTS+  HI: left corrective saccade on right head impulse  N: only end gaze fatigable nystagmus  TS: negative  Hearing intact and  symmetric to finger rub b/l as above    - Cranial Nerves:   PERRL, VFF, EOMI, facial sensation is intact in the V1-V3 distribution bilaterally; face is symmetric with augusto smile; hearing is intact to finger rub bilaterally and equally; uvula is midline and soft palate rises symmetrically; shoulder shrug intact; tongue protrudes in the midline with intact lateral movements    - Motor Testing:  Bulk: Normal   Tone: Normal  Strength:  There is no pronator drift b/l  There is no leg drift b/l  NO orbiting  intact rapid finger rapping                              Right           Left  Should-Abduct      5                   5   -some pain limitations  Elbow-Flex             5                   5  Elbow-Ext              5                   5                        5                   5    Hip-Flex                 5                   5  Knee-Flex              5                   5  Knee-Ext                5                   5  Dorsiflex                5                   5  Plantarflex             5                   5    - Reflexes:              Right           Left  Triceps                     2+                2+  Biceps                      2+                 2+  Brachioradialis         2+                2+  Patellar                    2+                 2+    requires distraction to elicit all reflexes    - Sensory: Intact throughout to light touch.   - Coordination: Finger-nose-finger intact without dysmetria.   ROmberg negative   - Gait: Normal steps, base, arm swing, and turning.  Able to tandem walk. Able to heel and toe walk.    I personally reviewed the below data/images/labs:      CBC Full  -  ( 11 Apr 2025 09:55 )  WBC Count : 10.66 K/uL  RBC Count : 5.57 M/uL  Hemoglobin : 12.5 g/dL  Hematocrit : 39.3 %  Platelet Count - Automated : 380 K/uL  Mean Cell Volume : 70.6 fl  Mean Cell Hemoglobin : 22.4 pg  Mean Cell Hemoglobin Concentration : 31.8 g/dL  Auto Neutrophil # : x  Auto Lymphocyte # : x  Auto Monocyte # : x  Auto Eosinophil # : x  Auto Basophil # : x  Auto Neutrophil % : x  Auto Lymphocyte % : x  Auto Monocyte % : x  Auto Eosinophil % : x  Auto Basophil % : x    04-11    135  |  99  |  10  ----------------------------<  88  4.2   |  21[L]  |  1.09    Ca    8.6      11 Apr 2025 09:55  Phos  3.5     04-11  Mg     2.1     04-11    TPro  6.6  /  Alb  3.9  /  TBili  0.8  /  DBili  x   /  AST  11  /  ALT  9[L]  /  AlkPhos  63  04-11    LIVER FUNCTIONS - ( 11 Apr 2025 09:55 )  Alb: 3.9 g/dL / Pro: 6.6 g/dL / ALK PHOS: 63 U/L / ALT: 9 U/L / AST: 11 U/L / GGT: x             Urinalysis Basic - ( 11 Apr 2025 09:55 )    Color: x / Appearance: x / SG: x / pH: x  Gluc: 88 mg/dL / Ketone: x  / Bili: x / Urobili: x   Blood: x / Protein: x / Nitrite: x   Leuk Esterase: x / RBC: x / WBC x   Sq Epi: x / Non Sq Epi: x / Bacteria: x      < from: CT Head No Cont (04.07.25 @ 10:22) >    ACC: [id number] EXAM:  CT BRAIN   ORDERED BY:  SERINA BOWEN     PROCEDURE DATE:  04/07/2025          INTERPRETATION:  CLINICAL INFORMATION: headaches    COMPARISON: CT head 10/23/2024    CONTRAST:  IV Contrast: NONE  .    TECHNIQUE:  Serial axial images were obtained from the skull base to the   vertex using multi-slice helical technique. Sagittal and coronal   reformats were obtained.    FINDINGS:    VENTRICLES AND SULCI: Normal in size and configuration.  INTRA-AXIAL: No mass effect, acute hemorrhage, or midline shift.  EXTRA-AXIAL: No mass or fluid collection. Basal cisterns are normal in   appearance.    VISUALIZED SINUSES:  Clear.  TYMPANOMASTOID CAVITIES:  Clear.  VISUALIZED ORBITS: Normal.  CALVARIUM: Intact.    MISCELLANEOUS: None.      IMPRESSION:  No acute intracranial hemorrhage, mass effect, or midline shift.        --- End of Report ---            AAKASH SIMON MD; Attending Radiologist  This document has been electronically signed. Apr 7 2025 11:05AM    < end of copied text >

## 2025-04-11 NOTE — PROGRESS NOTE ADULT - ASSESSMENT
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 Patient p/w right sided abdominal pain x2 weeks and recurrent c/f abscess, was started on doxycycline outpatient however pain and swelling have worsened. Surgery performed an InD on 4/3. New site has some induration palpable. Previous CT on 4/2 noted some small collection in that area but no recent imaging to define collections.     Recommendations:   - No surgical intervention indicated for LLQ collection  - If patient becomes symptomatic from the collection, please reconsult at that time  - Please change packing in previous site daily for wound care.   - Discussed w/ patient and primary team     ACS/Trauma  [id number]

## 2025-04-11 NOTE — CHART NOTE - NSCHARTNOTEFT_GEN_A_CORE
Discussed with ID attending Dr. Gonsales. Per Dr. Gonsales, cannot exclude the possibility of bacteremia and the placement of a PICC would be a risk vs. benefit discussion. Dr. Gonsales suggested a midline as a temporary placement instead. Discussed with IR, will place midline instead and draw BCx in order to rule out bacteremia.     Lg Chowdhury MD PGY-3

## 2025-04-11 NOTE — PROGRESS NOTE ADULT - PROBLEM SELECTOR PLAN 1
- ongoing pre-lung transplant workup  - seen by pul transplant service   - Patient is now being considered for transplant inpatient per Dr Wise    PLAN  - Allergy consult - will further clarify hx w/pt tomorrow and can potentially proceed with oral amox challenge inpatient if no further hx concerning.  - needs cardiac MRI but needs to be transitioned to an MRI compatible pump before that can happen, pulm transplant is working in it   - ENT consult for vertigo - ortho vitals and OP consult   - Neuro consult for headache - prescribed headache , see note but has worked for her   - Pulm following - ongoing assessment of hypotension iso pulm HTN, midodrine increased to 20 TID   - general surgery consulted - for new fluid collection and maybe drainage?  - ID following - wanting a GS consult as above  - home supplemental O2 2L NC, keep sats > 92%, avoid hyperoxia

## 2025-04-11 NOTE — PROGRESS NOTE ADULT - PROBLEM SELECTOR PLAN 3
Sent in by outside pulmonary doctor for concern that abscess was not resolving   Patient has Remodulin pump in the area of the abscess   Started two weeks ago and was started on doxycycline   Denies fevers, chills, vomiting, myalgias during this time period   Had chronic diarrhea and occasional vomiting as a known side effects of some of her medications   Patient is allergic to vancomycin   - CT abdomen pelvis Peripherally enhancing lesion in the right mid abdominal wall subjacent to diffuse skin thickening measuring 5.0 x 2.1 x 4.0 cm consistent with phlegmon/abscess  Superficial skin thickening in the left abdominal wall at the level of the umbilicus. Underlying small abscess measures 8 mm.  MRSA on wound culture     PLAN  - patient now endorsing new pain overlying the insertion site of the pump- pending  GS consult for drainage   - Linezolid 600 Q12 PO hours end of date 4/16/25  - can continue linezolid 600 q12 oral for total 10 to 14 days - PA approved, will have to resent once she is closer to dc  - If patient begins to decompensate, can switch to Daptomycin 6mg/kg  - Blood cultures x2 - NGTD  - s/p I&D - ID following

## 2025-04-11 NOTE — PROCEDURE NOTE - ADDITIONAL PROCEDURE DETAILS
tip in SVC, ok to use. Difficult passing wire past left brachiocephalic vein. If requesting long term central access, consider tunneled central venous catheter.

## 2025-04-11 NOTE — PROGRESS NOTE ADULT - ATTENDING COMMENTS
Abdominal wall abscess s/p I&D- MRSA- transplant ID following, abx changed from PO Linezolid to daptomycin IV  Vaginal candidiasis- fluconazole ordered  Chronic hypoxic respiratory failure on home O2 due to pHTN with right sided heart failure- pulmonary following- continue Remodulin  Chronic HAs/dizziness- CT head unremarkable- seen by ENT and neurology- treating for vestibulitis and migraines- improved   Hypotension- improved with holding diuretics and sildenafil- continue midodrine  R sided heart failure- TTE with EF 70%, severely enlarged RV and mild pHTN- pending cMRI  Asthma- continue Pulmicort, Singulair, Duonebs PRN, Flonase  DVT history- continue Xarelto  Patient to remain hospitalized for full transplant assessment

## 2025-04-11 NOTE — CHART NOTE - NSCHARTNOTEFT_GEN_A_CORE
Assessment and Plan:    Cardiovascular:    Hold bumetanide, spironolactone, sildenafil, and nifedipine due to hypotension  Continue midodrine 20mg TID  Heart failure (HF) consult obtained  Cardiac MRI needed for transplant evaluation (requires transition to MRI-compatible pump)    Pulmonary:    Continue home respiratory medications  Continue supplemental O2 2L NC, maintain SpO2 > 92%  Pulmonary Hypertension: Continue home Remodulin; pulmonologist following    Infectious Disease:    Continue linezolid 600mg PO q12h for 10-14 days  Consider daptomycin if she decompensates  General surgery consult for possible drainage of new fluid collection  Infectious Disease (ID) following    Gastrointestinal:    Continue Miralax BID and senna    Neurologic:    Neurology consult for headaches  Currently managed with oxycodone PRN  ENT consult for vertigo; orthostatic vitals and ophthalmology consult  Hematology/Oncology:    Address iron deficiency anemia once infection and other acute issues are stabilized. (No specific plan provided in original note).    Transplant:    Lung transplant evaluation ongoing  Consideration for inpatient transplant workup per Dr. Wise  Allergy consult for amoxicillin allergy evaluation  Thromboembolic Disease:    Restarting rivaroxaban (Xarelto) for DVT prophylaxis HPI:   44y F , with PMH of pHTN (on treprostinil infusion still continuing ), Right   Heart failure(s/p PPM dual chamber ), chronic PE (dx ) on DOAC, SVT (s/p ablation 2020), asthma, h/o MRSA abdominal wall abscesses last , and JULIA. was admitted after being sent in from outpatient pulmonologist. for concern of worsening abdominal abscess in proximity to medication pump that has been unresponsive to doxycyline. Per patient she started having abdominal pain and increased swelling two weeks ago. Went into her outpatient transplant ID doctor Dr Ash and was given a course of doxycyline yesterday she presented to her pulmonologist DR Yoon who was concerned about the size of the abscess and that it was not improving. She was sent in for further evaluation Patient denied cough, fevers, myalgias vomiting, diarrhea during the time period of the abscess formations.      Assessment and Plan:    Cardiovascular:    Hold bumetanide, spironolactone, sildenafil, and nifedipine due to hypotension  Continue midodrine 20mg TID  Heart failure (HF) consult obtained  Cardiac MRI needed for transplant evaluation (requires transition to MRI-compatible pump)    Pulmonary:    Continue home respiratory medications  Continue supplemental O2 2L NC, maintain SpO2 > 92%  Pulmonary Hypertension: Continue home Remodulin; pulmonologist following    Infectious Disease:    Continue linezolid 600mg PO q12h for 10-14 days  Consider daptomycin if she decompensates  General surgery consult for possible drainage of new fluid collection  Infectious Disease (ID) following    Gastrointestinal:    Continue Miralax BID and senna    Neurologic:    Neurology consult for headaches  Currently managed with oxycodone PRN  ENT consult for vertigo; orthostatic vitals and ophthalmology consult  Hematology/Oncology:    Address iron deficiency anemia once infection and other acute issues are stabilized. (No specific plan provided in original note).    Transplant:    Lung transplant evaluation ongoing  Consideration for inpatient transplant workup per Dr. Wise  Allergy consult for amoxicillin allergy evaluation  Thromboembolic Disease:    Restarting rivaroxaban (Xarelto) for DVT prophylaxis HPI:   44y F , with PMH of pHTN (on treprostinil infusion still continuing ), Right   Heart failure(s/p PPM dual chamber ), chronic PE (dx ) on DOAC, SVT (s/p ablation 2020), asthma, h/o MRSA abdominal wall abscesses last , and JULIA. was admitted after being sent in from outpatient pulmonologist. for concern of worsening abdominal abscess in proximity to medication pump that has been unresponsive to doxycyline. Per patient she started having abdominal pain and increased swelling two weeks ago. Went into her outpatient transplant ID doctor Dr Ash and was given a course of doxycyline yesterday she presented to her pulmonologist DR Yoon who was concerned about the size of the abscess and that it was not improving. She was sent in for further evaluation Patient denied cough, fevers, myalgias vomiting, diarrhea during the time period of the abscess formations.      Assessment and Plan:    Neurologic:  -AAOx3    #migraines  -Headache cocktail per neuro: "-First line: recommend migraine medications (to be given all together at the same time if no contraindications form comorbitities): ketorolac (Toradol) 30mg IV q8h PRN (or acetaminophen 1g IV q8h PRN), metoclopramide (Reglan) 10mg q8h PRN, diphenhydramine (Benadryl) 25mg IV q8h PRN. Please repeat at least 2-3 cycles for medications to be effective."  -Currently managed with oxycodone PRN    #peripheral vertigo  -meclizine 12.5 mg PRN     Cardiovascular:    Hold bumetanide, spironolactone, sildenafil, and nifedipine due to hypotension  Continue midodrine 20mg TID  Heart failure (HF) consult obtained  Cardiac MRI needed for transplant evaluation (requires transition to MRI-compatible pump)    Pulmonary:    Continue home respiratory medications  Continue supplemental O2 2L NC, maintain SpO2 > 92%  Pulmonary Hypertension: Continue home Remodulin; pulmonologist following    Infectious Disease:    Continue linezolid 600mg PO q12h for 10-14 days  Consider daptomycin if she decompensates  General surgery consult for possible drainage of new fluid collection  Infectious Disease (ID) following    Gastrointestinal:    Continue Miralax BID and senna      Hematology/Oncology:    Address iron deficiency anemia once infection and other acute issues are stabilized. (No specific plan provided in original note).    Transplant:    Lung transplant evaluation ongoing  Consideration for inpatient transplant workup per Dr. Wise  Allergy consult for amoxicillin allergy evaluation  Thromboembolic Disease:    Restarting rivaroxaban (Xarelto) for DVT prophylaxis HPI:   44y F , with PMH of pHTN (on treprostinil infusion still continuing ), Right   Heart failure(s/p PPM dual chamber ), chronic PE (dx ) on DOAC, SVT (s/p ablation 2020), asthma, h/o MRSA abdominal wall abscesses last , and JULIA. was admitted after being sent in from outpatient pulmonologist. for concern of worsening abdominal abscess in proximity to medication pump that has been unresponsive to doxycyline. Per patient she started having abdominal pain and increased swelling two weeks ago. Went into her outpatient transplant ID doctor Dr Ahs and was given a course of doxycyline yesterday she presented to her pulmonologist DR Yoon who was concerned about the size of the abscess and that it was not improving. She was sent in for further evaluation Patient denied cough, fevers, myalgias vomiting, diarrhea during the time period of the abscess formations.      Assessment and Plan:    Neurologic:  -AAOx3    #migraines  -Headache cocktail per neuro: "-First line: recommend migraine medications (to be given all together at the same time if no contraindications form comorbitities): ketorolac (Toradol) 30mg IV q8h PRN (or acetaminophen 1g IV q8h PRN), metoclopramide (Reglan) 10mg q8h PRN, diphenhydramine (Benadryl) 25mg IV q8h PRN. Please repeat at least 2-3 cycles for medications to be effective."  -Currently managed with oxycodone PRN    #peripheral vertigo  -meclizine 12.5 mg PRN     Cardiovascular:    Hold bumetanide, spironolactone, sildenafil, and nifedipine due to hypotension  Continue midodrine 20mg TID  Heart failure (HF) consult obtained  Cardiac MRI needed for transplant evaluation (requires transition to MRI-compatible pump)    Pulmonary:    Continue home respiratory medications  Continue supplemental O2 2L NC, maintain SpO2 > 92%  Pulmonary Hypertension: Continue home Remodulin; pulmonologist following    Infectious Disease:    Continue linezolid 600mg PO q12h for 10-14 days  Consider daptomycin if she decompensates  General surgery consult for possible drainage of new fluid collection  Infectious Disease (ID) following    Gastrointestinal:    Continue Miralax BID and senna      Hematology/Oncology:    Address iron deficiency anemia once infection and other acute issues are stabilized. (No specific plan provided in original note).    Transplant:    Lung transplant evaluation ongoing  Consideration for inpatient transplant workup per Dr. Wise  Allergy consult for amoxicillin allergy evaluation  Thromboembolic Disease:    Restarting rivaroxaban (Xarelto) for DVT prophylaxis                Cardiovascular:    Hypotension:  Hold bumetanide, spironolactone, sildenafil, and nifedipine.  Start midodrine 20 mg TID.  Heart failure consult obtained.  Right Heart Failure:  Hold bumetanide, spironolactone, and nifedipine.  Pulmonary Hypertension:  Continue home Remodulin 51 ng/kg/min.  Check BNP, TTE.  Pulmonology following.  History of SVT, DVT:  Restart rivaroxaban (Xarelto) per surgery recommendations.  Pulmonary:    Asthma:  Continue home ipratropium (Atrovent) nebulizer PRN.  Continue home budesonide/formoterol (Pulmicort), tiotropium (Spiriva), and PRN nebulizers.  Continue fluticasone (Flonase).  Maintain supplemental O2 2L NC, keep SpO2 > 92%, avoid hyperoxia.  Chronic PE:  On rivaroxaban (Xarelto); restarted per surgery.  Infectious Disease:    Abdominal Abscess:  Continue linezolid 600 mg PO q12h for 10-14 days.  If the patient decompensates, switch to daptomycin 6 mg/kg.  General surgery consult for possible drainage of new fluid collection.  Infectious Disease following.  History of MRSA:  Monitor for signs of worsening infection.  Gastrointestinal:    Constipation:  Continue polyethylene glycol (Miralax) BID and senna.  Neurologic:    Headache:  Neurology consult obtained.  Prescribe oxycodone 2.5 mg q6h PRN for headache.  Vertigo:  ENT consult obtained.  Orthostatic vital signs and ophthalmology consult.  Hematology:    Iron Deficiency Anemia (JULIA): Address once acute issues are stabilized. (No specific plan in the original note).  Transplant:    Lung Transplant Evaluation:  Ongoing pre-lung transplant workup.  Seen by pulmonary transplant service.  Patient is being considered for inpatient transplant workup per Dr. Wise.  Needs cardiac MRI (requires transition to MRI-compatible pump—pulmonary transplant team working on this).  Allergy consult to clarify amoxicillin allergy history and potentially proceed with oral amoxicillin challenge if no concerning history.  Other:    Disposition: Pending. HPI:   44y F , with PMH of pHTN (on treprostinil infusion still continuing ), Right   Heart failure(s/p PPM dual chamber ), chronic PE (dx ) on DOAC, SVT (s/p ablation 2020), asthma, h/o MRSA abdominal wall abscesses last , and JULIA. was admitted after being sent in from outpatient pulmonologist. for concern of worsening abdominal abscess in proximity to medication pump that has been unresponsive to doxycyline. Per patient she started having abdominal pain and increased swelling two weeks ago. Went into her outpatient transplant ID doctor Dr Ash and was given a course of doxycyline yesterday she presented to her pulmonologist DR Yoon who was concerned about the size of the abscess and that it was not improving. She was sent in for further evaluation Patient denied cough, fevers, myalgias vomiting, diarrhea during the time period of the abscess formations.      Assessment and Plan:    Neurologic:  -AAOx3    #migraines  -Headache cocktail per neuro: "-First line: recommend migraine medications (to be given all together at the same time if no contraindications form comorbitities): ketorolac (Toradol) 30mg IV q8h PRN (or acetaminophen 1g IV q8h PRN), metoclopramide (Reglan) 10mg q8h PRN, diphenhydramine (Benadryl) 25mg IV q8h PRN. Please repeat at least 2-3 cycles for medications to be effective."  -Currently managed with oxycodone PRN    #peripheral vertigo  -meclizine 12.5 mg PRN     Cardiovascular:  #Hypotension:  -Hold bumetanide, spironolactone, sildenafil, and nifedipine.  -Start midodrine 20 mg TID.  -Heart failure consult obtained.    #Right Heart Failure:  -Hold bumetanide, spironolactone, and nifedipine.    #Pulmonary Hypertension:  -Continue home Remodulin 51 ng/kg/min.  -PICC line in place by IR on   -Check BNP, TTE.  -Pulmonology following.    #History of SVT, DVT:  -Restart rivaroxaban (Xarelto) per surgery recommendations.    Pulmonary:    Continue home respiratory medications  Continue supplemental O2 2L NC, maintain SpO2 > 92%  Pulmonary Hypertension: Continue home Remodulin; pulmonologist following    Infectious Disease:    Continue linezolid 600mg PO q12h for 10-14 days  Consider daptomycin if she decompensates  General surgery consult for possible drainage of new fluid collection  Infectious Disease (ID) following    Gastrointestinal:    Continue Miralax BID and senna      Hematology/Oncology:    Address iron deficiency anemia once infection and other acute issues are stabilized. (No specific plan provided in original note).    Transplant:    Lung transplant evaluation ongoing  Consideration for inpatient transplant workup per Dr. Wise  Allergy consult for amoxicillin allergy evaluation  Thromboembolic Disease:    Restarting rivaroxaban (Xarelto) for DVT prophylaxis              Pulmonary:    Asthma:  Continue home ipratropium (Atrovent) nebulizer PRN.  Continue home budesonide/formoterol (Pulmicort), tiotropium (Spiriva), and PRN nebulizers.  Continue fluticasone (Flonase).  Maintain supplemental O2 2L NC, keep SpO2 > 92%, avoid hyperoxia.  Chronic PE:  On rivaroxaban (Xarelto); restarted per surgery.  Infectious Disease:    Abdominal Abscess:  Continue linezolid 600 mg PO q12h for 10-14 days.  If the patient decompensates, switch to daptomycin 6 mg/kg.  General surgery consult for possible drainage of new fluid collection.  Infectious Disease following.  History of MRSA:  Monitor for signs of worsening infection.  Gastrointestinal:    Constipation:  Continue polyethylene glycol (Miralax) BID and senna.  Neurologic:    Headache:  Neurology consult obtained.  Prescribe oxycodone 2.5 mg q6h PRN for headache.  Vertigo:  ENT consult obtained.  Orthostatic vital signs and ophthalmology consult.  Hematology:    Iron Deficiency Anemia (JULIA): Address once acute issues are stabilized. (No specific plan in the original note).  Transplant:    Lung Transplant Evaluation:  Ongoing pre-lung transplant workup.  Seen by pulmonary transplant service.  Patient is being considered for inpatient transplant workup per Dr. Wise.  Needs cardiac MRI (requires transition to MRI-compatible pump—pulmonary transplant team working on this).  Allergy consult to clarify amoxicillin allergy history and potentially proceed with oral amoxicillin challenge if no concerning history.  Other:    Disposition: Pending. HPI:   44y F , with PMH of pHTN (on treprostinil infusion still continuing ), Right   Heart failure(s/p PPM dual chamber ), chronic PE (dx ) on DOAC, SVT (s/p ablation 2020), asthma, h/o MRSA abdominal wall abscesses last , and JULIA. was admitted after being sent in from outpatient pulmonologist. for concern of worsening abdominal abscess in proximity to medication pump that has been unresponsive to doxycyline. Per patient she started having abdominal pain and increased swelling two weeks ago. Went into her outpatient transplant ID doctor Dr Ash and was given a course of doxycyline yesterday she presented to her pulmonologist DR Yoon who was concerned about the size of the abscess and that it was not improving. She was sent in for further evaluation Patient denied cough, fevers, myalgias vomiting, diarrhea during the time period of the abscess formations.    ROS: negative except per HPI    MEDICATIONS  (STANDING):  acetaminophen   IVPB .. 1000 milliGRAM(s) IV Intermittent once  acetaminophen   IVPB .. 1000 milliGRAM(s) IV Intermittent once  buDESOnide    Inhalation Suspension 0.5 milliGRAM(s) Inhalation daily  chlorhexidine 2% Cloths 1 Application(s) Topical daily  chlorhexidine 2% Cloths 1 Application(s) Topical <User Schedule>  DAPTOmycin IVPB      fluconAZOLE   Tablet 150 milliGRAM(s) Oral once  fluticasone propionate 50 MICROgram(s)/spray Nasal Spray 1 Spray(s) Both Nostrils two times a day  influenza   Vaccine 0.5 milliLiter(s) IntraMuscular once  ketorolac   Injectable 15 milliGRAM(s) IV Push once  lidocaine   4% Patch 1 Patch Transdermal daily  lidocaine 1%/epinephrine 1:100,000 Inj 20 milliLiter(s) Local Injection once  midodrine. 20 milliGRAM(s) Oral three times a day  montelukast 10 milliGRAM(s) Oral daily  pantoprazole    Tablet 40 milliGRAM(s) Oral before breakfast  polyethylene glycol 3350 17 Gram(s) Oral two times a day  remodulin (treprostinil) SubQ infusion 1 Dose(s) 1 Dose(s) SubCutaneous Continuous Pump  rivaroxaban 15 milliGRAM(s) Oral with dinner  senna 2 Tablet(s) Oral at bedtime    MEDICATIONS  (PRN):  acetaminophen     Tablet .. 1000 milliGRAM(s) Oral every 6 hours PRN Mild Pain (1 - 3), Moderate Pain (4 - 6)  albuterol    90 MICROgram(s) HFA Inhaler 2 Puff(s) Inhalation every 6 hours PRN Shortness of Breath  albuterol/ipratropium for Nebulization 3 milliLiter(s) Nebulizer every 6 hours PRN Shortness of Breath  meclizine 12.5 milliGRAM(s) Oral once PRN Dizziness  melatonin 9 milliGRAM(s) Oral at bedtime PRN Insomnia  oxyCODONE    IR 2.5 milliGRAM(s) Oral every 6 hours PRN Severe Pain (7 - 10)      CAPILLARY BLOOD GLUCOSE        I&O's Summary    10 Apr 2025 07:01  -  2025 07:00  --------------------------------------------------------  IN: 480 mL / OUT: 0 mL / NET: 480 mL        PHYSICAL EXAM:  Vital Signs Last 24 Hrs  T(C): 36.6 (2025 17:56), Max: 36.6 (10 Apr 2025 23:55)  T(F): 97.9 (2025 17:56), Max: 97.9 (10 Apr 2025 23:55)  HR: 67 (2025 17:56) (66 - 85)  BP: 114/69 (2025 17:56) (83/57 - 115/66)  BP(mean): 86 (2025 17:56) (86 - 86)  RR: 20 (2025 17:56) (18 - 20)  SpO2: 99% (2025 17:56) (98% - 100%)    Parameters below as of 2025 17:56  Patient On (Oxygen Delivery Method): nasal cannula  O2 Flow (L/min): 2      PHYSICAL EXAM:  GENERAL: NAD, lying in bed comfortably  HEENT: NC/AT  NECK: Supple, No JVD  CHEST/LUNG: CTAB, no increased WOB  HEART: RRR, no m/r/g  ABDOMEN: soft, NT, ND, BS+  EXTREMITIES:  2+ peripheral pulses, no edema  NERVOUS SYSTEM:  A&Ox3  MSK: FROM all 4 extremities, full and equal strength  SKIN: No rashes or lesions    LABS:                        12.5   10.66 )-----------( 380      ( 2025 09:55 )             39.3     -    135  |  99  |  10  ----------------------------<  88  4.2   |  21[L]  |  1.09    Ca    8.6      2025 09:55  Phos  3.5     -  Mg     2.1         TPro  6.6  /  Alb  3.9  /  TBili  0.8  /  DBili  x   /  AST  11  /  ALT  9[L]  /  AlkPhos  63            Urinalysis Basic - ( 2025 09:55 )    Color: x / Appearance: x / SG: x / pH: x  Gluc: 88 mg/dL / Ketone: x  / Bili: x / Urobili: x   Blood: x / Protein: x / Nitrite: x   Leuk Esterase: x / RBC: x / WBC x   Sq Epi: x / Non Sq Epi: x / Bacteria: x      Assessment and Plan:    Neurologic:  -AAOx3    #migraines  -Headache cocktail per neuro: "-First line: recommend migraine medications (to be given all together at the same time if no contraindications form comorbitities): ketorolac (Toradol) 30mg IV q8h PRN (or acetaminophen 1g IV q8h PRN), metoclopramide (Reglan) 10mg q8h PRN, diphenhydramine (Benadryl) 25mg IV q8h PRN. Please repeat at least 2-3 cycles for medications to be effective."  -Currently managed with oxycodone PRN    #peripheral vertigo  -meclizine 12.5 mg PRN     Cardiovascular:  #Hypotension:  -Hold bumetanide, spironolactone, sildenafil, and nifedipine.  -Start midodrine 20 mg TID.  -Heart failure consult obtained.    #Right Heart Failure:  -Hold bumetanide, spironolactone, and nifedipine.    #Pulmonary Hypertension:  -Continue home Remodulin 51 ng/kg/min.  -PICC line in place by IR on   -Check BNP, TTE.  -Pulmonology following.    #Chronic PE  -Restart rivaroxaban (Xarelto) per surgery recommendations.    Pulmonary:  #Pulmonary Hypertension:  -Continue home Remodulin 51 ng/kg/min.  -PICC line in place by IR on   -Check BNP, TTE.  -Pulmonology following.    #Asthma  Continue home ipratropium (Atrovent) nebulizer PRN.  Continue home budesonide/formoterol (Pulmicort), tiotropium (Spiriva), and PRN nebulizers.  Continue fluticasone (Flonase).  Maintain supplemental O2 2L NC, keep SpO2 > 92%, avoid hyperoxia.    #Chronic PE:  On rivaroxaban (Xarelto); restarted per surgery.    #Lung Transplant Evaluation  Ongoing pre-lung transplant workup.  Seen by pulmonary transplant service.  Patient is being considered for inpatient transplant workup per Dr. Wise.  Needs cardiac MRI (requires transition to MRI-compatible pump—pulmonary transplant team working on this).    /Renal:  -no active issues  -replete electrolytes as needed     GI:  #Abdominal Abscess  linezolid 600 mg PO q12h for 10-14 days until  was dc'ed by ID and switched to daptomycin 6 mg/kg until   Per general surgery, no acute surgical intervention at this time for abscess.     #constipation  -Continue Miralax BID and senna    Hematology/Onc:  #Iron Deficiency Anemia (JULIA)  -Address once acute issues are stabilized. (No specific plan in the original note).    ID:  #Abdominal Abscess  linezolid 600 mg PO q12h for 10-14 days until  was dc'ed by ID and switched to daptomycin 6 mg/kg until   f/u ID recommendations HPI:   44y F , with PMH of pHTN (on treprostinil infusion still continuing ), Right Heart failure(s/p PPM dual chamber 2016), chronic PE (dx 2017) on DOAC, SVT (s/p ablation 05/2020), asthma, h/o MRSA abdominal wall abscesses last 2/'25, and JULIA. was admitted after being sent in from outpatient pulmonologist. for concern of worsening abdominal abscess in proximity to medication pump that has been unresponsive to doxycyline. Per patient she started having abdominal pain and increased swelling two weeks ago. Went into her outpatient transplant ID doctor Dr Ash and was given a course of doxycyline yesterday she presented to her pulmonologist DR Yoon who was concerned about the size of the abscess and that it was not improving. She was sent in for further evaluation Patient denied cough, fevers, myalgias vomiting, diarrhea during the time period of the abscess formations.    ROS: negative except per HPI    MEDICATIONS  (STANDING):  acetaminophen   IVPB .. 1000 milliGRAM(s) IV Intermittent once  acetaminophen   IVPB .. 1000 milliGRAM(s) IV Intermittent once  buDESOnide    Inhalation Suspension 0.5 milliGRAM(s) Inhalation daily  chlorhexidine 2% Cloths 1 Application(s) Topical daily  chlorhexidine 2% Cloths 1 Application(s) Topical <User Schedule>  DAPTOmycin IVPB      fluconAZOLE   Tablet 150 milliGRAM(s) Oral once  fluticasone propionate 50 MICROgram(s)/spray Nasal Spray 1 Spray(s) Both Nostrils two times a day  influenza   Vaccine 0.5 milliLiter(s) IntraMuscular once  ketorolac   Injectable 15 milliGRAM(s) IV Push once  lidocaine   4% Patch 1 Patch Transdermal daily  lidocaine 1%/epinephrine 1:100,000 Inj 20 milliLiter(s) Local Injection once  midodrine. 20 milliGRAM(s) Oral three times a day  montelukast 10 milliGRAM(s) Oral daily  pantoprazole    Tablet 40 milliGRAM(s) Oral before breakfast  polyethylene glycol 3350 17 Gram(s) Oral two times a day  remodulin (treprostinil) SubQ infusion 1 Dose(s) 1 Dose(s) SubCutaneous Continuous Pump  rivaroxaban 15 milliGRAM(s) Oral with dinner  senna 2 Tablet(s) Oral at bedtime    MEDICATIONS  (PRN):  acetaminophen     Tablet .. 1000 milliGRAM(s) Oral every 6 hours PRN Mild Pain (1 - 3), Moderate Pain (4 - 6)  albuterol    90 MICROgram(s) HFA Inhaler 2 Puff(s) Inhalation every 6 hours PRN Shortness of Breath  albuterol/ipratropium for Nebulization 3 milliLiter(s) Nebulizer every 6 hours PRN Shortness of Breath  meclizine 12.5 milliGRAM(s) Oral once PRN Dizziness  melatonin 9 milliGRAM(s) Oral at bedtime PRN Insomnia  oxyCODONE    IR 2.5 milliGRAM(s) Oral every 6 hours PRN Severe Pain (7 - 10)      CAPILLARY BLOOD GLUCOSE        I&O's Summary    10 Apr 2025 07:01  -  11 Apr 2025 07:00  --------------------------------------------------------  IN: 480 mL / OUT: 0 mL / NET: 480 mL        PHYSICAL EXAM:  Vital Signs Last 24 Hrs  T(C): 36.6 (11 Apr 2025 17:56), Max: 36.6 (10 Apr 2025 23:55)  T(F): 97.9 (11 Apr 2025 17:56), Max: 97.9 (10 Apr 2025 23:55)  HR: 67 (11 Apr 2025 17:56) (66 - 85)  BP: 114/69 (11 Apr 2025 17:56) (83/57 - 115/66)  BP(mean): 86 (11 Apr 2025 17:56) (86 - 86)  RR: 20 (11 Apr 2025 17:56) (18 - 20)  SpO2: 99% (11 Apr 2025 17:56) (98% - 100%)    Parameters below as of 11 Apr 2025 17:56  Patient On (Oxygen Delivery Method): nasal cannula  O2 Flow (L/min): 2      PHYSICAL EXAM:  GENERAL: NAD, lying in bed comfortably  HEENT: NC/AT  NECK: Supple, No JVD  CHEST/LUNG: CTAB, no increased WOB  HEART: RRR, no m/r/g  ABDOMEN: soft, NT, ND, BS+  EXTREMITIES:  2+ peripheral pulses, no edema  NERVOUS SYSTEM:  A&Ox3  MSK: FROM all 4 extremities, full and equal strength  SKIN: No rashes or lesions    LABS:                        12.5   10.66 )-----------( 380      ( 11 Apr 2025 09:55 )             39.3     04-11    135  |  99  |  10  ----------------------------<  88  4.2   |  21[L]  |  1.09    Ca    8.6      11 Apr 2025 09:55  Phos  3.5     04-11  Mg     2.1     04-11    TPro  6.6  /  Alb  3.9  /  TBili  0.8  /  DBili  x   /  AST  11  /  ALT  9[L]  /  AlkPhos  63  04-11          Urinalysis Basic - ( 11 Apr 2025 09:55 )    Color: x / Appearance: x / SG: x / pH: x  Gluc: 88 mg/dL / Ketone: x  / Bili: x / Urobili: x   Blood: x / Protein: x / Nitrite: x   Leuk Esterase: x / RBC: x / WBC x   Sq Epi: x / Non Sq Epi: x / Bacteria: x      Assessment and Plan:    Neurologic:  -AAOx3    #migraines  -Headache cocktail per neuro: "-First line: recommend migraine medications (to be given all together at the same time if no contraindications form comorbitities): ketorolac (Toradol) 30mg IV q8h PRN (or acetaminophen 1g IV q8h PRN), metoclopramide (Reglan) 10mg q8h PRN, diphenhydramine (Benadryl) 25mg IV q8h PRN. Please repeat at least 2-3 cycles for medications to be effective."  -Currently managed with oxycodone PRN    #peripheral vertigo  -meclizine 12.5 mg PRN     Cardiovascular:  #Hypotension:  -Hold bumetanide, spironolactone, sildenafil, and nifedipine.  -Start midodrine 20 mg TID.  -Heart failure consult obtained.    #Right Heart Failure:  -Hold bumetanide, spironolactone, and nifedipine.    #Pulmonary Hypertension:  -Continue home Remodulin 51 ng/kg/min.  -PICC line in place by IR on 4/11  -Check BNP, TTE.  -Pulmonology following.    #Chronic PE  -Restart rivaroxaban (Xarelto) per surgery recommendations.    Pulmonary:  #Pulmonary Hypertension:  -Continue home Remodulin 51 ng/kg/min.  -PICC line in place by IR on 4/11  -Check BNP, TTE.  -Pulmonology following.    #Asthma  Continue home ipratropium (Atrovent) nebulizer PRN.  Continue home budesonide/formoterol (Pulmicort), tiotropium (Spiriva), and PRN nebulizers.  Continue fluticasone (Flonase).  Maintain supplemental O2 2L NC, keep SpO2 > 92%, avoid hyperoxia.    #Chronic PE:  On rivaroxaban (Xarelto); restarted per surgery.    #Lung Transplant Evaluation  Ongoing pre-lung transplant workup.  Seen by pulmonary transplant service.  Patient is being considered for inpatient transplant workup per Dr. Wise.  Needs cardiac MRI (requires transition to MRI-compatible pump—pulmonary transplant team working on this).    /Renal:  -no active issues  -replete electrolytes as needed     GI:  #Abdominal Abscess  linezolid 600 mg PO q12h for 10-14 days until 4/16 was dc'ed by ID and switched to daptomycin 6 mg/kg until 4/19  Per general surgery, no acute surgical intervention at this time for abscess.     #constipation  -Continue Miralax BID and senna    Hematology/Onc:  #Iron Deficiency Anemia (JULIA)  -Address once acute issues are stabilized. (No specific plan in the original note).    ID:  #Abdominal Abscess  linezolid 600 mg PO q12h for 10-14 days until 4/16 was dc'ed by ID and switched to daptomycin 6 mg/kg until 4/19  f/u ID recommendations    ----------------------------------------  44F PMH pHTN (on Remodulin sq and sildenafil), UE DVT on Xarelto, Asthma and CHF p/w recurrent abdominal wall abscesses s/p drainage  - Admitted to medicine, but still with orthostasis and hypotension  - Unclear if this represents sequelae of pulmonary hypertension or persistent / worsening infection.  - Minimal leukocytosis, afebrile. Abscess cultures growing MRSA, on daptomycin. Blood cultures negative. Consider trending procalcitonin.  - Will attempt to initiate Remodulin IV therapy, with close hemodynamic monitoring, possibly augmented with pressors.  - c/w Xarelto for DVT / PE  - Transfer to ICU for Remodulin IV initiation    I have personally provided 40 minutes of critical care time, excluding time spent on separate procedures. HPI:   44y F , with PMH of pHTN (on treprostinil infusion still continuing ), Right Heart failure(s/p PPM dual chamber 2016), chronic PE (dx 2017) on DOAC, SVT (s/p ablation 05/2020), asthma, h/o MRSA abdominal wall abscesses last 2/'25, and JULIA. was admitted after being sent in from outpatient pulmonologist. for concern of worsening abdominal abscess in proximity to medication pump that has been unresponsive to doxycyline. Per patient she started having abdominal pain and increased swelling two weeks ago. Went into her outpatient transplant ID doctor Dr Ash and was given a course of doxycyline yesterday she presented to her pulmonologist DR Yoon who was concerned about the size of the abscess and that it was not improving. She was sent in for further evaluation Patient denied cough, fevers, myalgias vomiting, diarrhea during the time period of the abscess formations.    ROS: negative except per HPI    MEDICATIONS  (STANDING):  acetaminophen   IVPB .. 1000 milliGRAM(s) IV Intermittent once  acetaminophen   IVPB .. 1000 milliGRAM(s) IV Intermittent once  buDESOnide    Inhalation Suspension 0.5 milliGRAM(s) Inhalation daily  chlorhexidine 2% Cloths 1 Application(s) Topical daily  chlorhexidine 2% Cloths 1 Application(s) Topical <User Schedule>  DAPTOmycin IVPB      fluconAZOLE   Tablet 150 milliGRAM(s) Oral once  fluticasone propionate 50 MICROgram(s)/spray Nasal Spray 1 Spray(s) Both Nostrils two times a day  influenza   Vaccine 0.5 milliLiter(s) IntraMuscular once  ketorolac   Injectable 15 milliGRAM(s) IV Push once  lidocaine   4% Patch 1 Patch Transdermal daily  lidocaine 1%/epinephrine 1:100,000 Inj 20 milliLiter(s) Local Injection once  midodrine. 20 milliGRAM(s) Oral three times a day  montelukast 10 milliGRAM(s) Oral daily  pantoprazole    Tablet 40 milliGRAM(s) Oral before breakfast  polyethylene glycol 3350 17 Gram(s) Oral two times a day  remodulin (treprostinil) SubQ infusion 1 Dose(s) 1 Dose(s) SubCutaneous Continuous Pump  rivaroxaban 15 milliGRAM(s) Oral with dinner  senna 2 Tablet(s) Oral at bedtime    MEDICATIONS  (PRN):  acetaminophen     Tablet .. 1000 milliGRAM(s) Oral every 6 hours PRN Mild Pain (1 - 3), Moderate Pain (4 - 6)  albuterol    90 MICROgram(s) HFA Inhaler 2 Puff(s) Inhalation every 6 hours PRN Shortness of Breath  albuterol/ipratropium for Nebulization 3 milliLiter(s) Nebulizer every 6 hours PRN Shortness of Breath  meclizine 12.5 milliGRAM(s) Oral once PRN Dizziness  melatonin 9 milliGRAM(s) Oral at bedtime PRN Insomnia  oxyCODONE    IR 2.5 milliGRAM(s) Oral every 6 hours PRN Severe Pain (7 - 10)      CAPILLARY BLOOD GLUCOSE        I&O's Summary    10 Apr 2025 07:01  -  11 Apr 2025 07:00  --------------------------------------------------------  IN: 480 mL / OUT: 0 mL / NET: 480 mL        PHYSICAL EXAM:  Vital Signs Last 24 Hrs  T(C): 36.6 (11 Apr 2025 17:56), Max: 36.6 (10 Apr 2025 23:55)  T(F): 97.9 (11 Apr 2025 17:56), Max: 97.9 (10 Apr 2025 23:55)  HR: 67 (11 Apr 2025 17:56) (66 - 85)  BP: 114/69 (11 Apr 2025 17:56) (83/57 - 115/66)  BP(mean): 86 (11 Apr 2025 17:56) (86 - 86)  RR: 20 (11 Apr 2025 17:56) (18 - 20)  SpO2: 99% (11 Apr 2025 17:56) (98% - 100%)    Parameters below as of 11 Apr 2025 17:56  Patient On (Oxygen Delivery Method): nasal cannula  O2 Flow (L/min): 2      PHYSICAL EXAM:  GENERAL: NAD, lying in bed comfortably  HEENT: NC/AT  NECK: Supple, No JVD  CHEST/LUNG: CTAB, no increased WOB  HEART: RRR, no m/r/g  ABDOMEN: soft, NT, ND, BS+  EXTREMITIES:  2+ peripheral pulses, no edema  NERVOUS SYSTEM:  A&Ox3  MSK: FROM all 4 extremities, full and equal strength  SKIN: No rashes or lesions    LABS:                        12.5   10.66 )-----------( 380      ( 11 Apr 2025 09:55 )             39.3     04-11    135  |  99  |  10  ----------------------------<  88  4.2   |  21[L]  |  1.09    Ca    8.6      11 Apr 2025 09:55  Phos  3.5     04-11  Mg     2.1     04-11    TPro  6.6  /  Alb  3.9  /  TBili  0.8  /  DBili  x   /  AST  11  /  ALT  9[L]  /  AlkPhos  63  04-11          Urinalysis Basic - ( 11 Apr 2025 09:55 )    Color: x / Appearance: x / SG: x / pH: x  Gluc: 88 mg/dL / Ketone: x  / Bili: x / Urobili: x   Blood: x / Protein: x / Nitrite: x   Leuk Esterase: x / RBC: x / WBC x   Sq Epi: x / Non Sq Epi: x / Bacteria: x      Assessment and Plan:    Neurologic:  -AAOx3    #migraines  -Headache cocktail per neuro: "-First line: recommend migraine medications (to be given all together at the same time if no contraindications form comorbitities): ketorolac (Toradol) 30mg IV q8h PRN (or acetaminophen 1g IV q8h PRN), metoclopramide (Reglan) 10mg q8h PRN, diphenhydramine (Benadryl) 25mg IV q8h PRN. Please repeat at least 2-3 cycles for medications to be effective."  -Currently managed with oxycodone PRN    #peripheral vertigo  -meclizine 12.5 mg PRN     Cardiovascular:  #Hypotension:  -Hold bumetanide, spironolactone, sildenafil, and nifedipine.  -Start midodrine 20 mg TID.  -Heart failure consult obtained.    #Right Heart Failure:  -Hold bumetanide, spironolactone, and nifedipine.    #Pulmonary Hypertension:  -Continue home Remodulin 51 ng/kg/min.  -PICC line in place by IR on 4/11  -Check BNP, TTE.  -Pulmonology following.    #Chronic PE  -Restart rivaroxaban (Xarelto) per surgery recommendations.    Pulmonary:  #Pulmonary Hypertension:  -Continue home Remodulin 51 ng/kg/min.  -PICC line in place by IR on 4/11  -Check BNP, TTE.  -Pulmonology following.    #Asthma  Continue home ipratropium (Atrovent) nebulizer PRN.  Continue home budesonide/formoterol (Pulmicort), tiotropium (Spiriva), and PRN nebulizers.  Continue fluticasone (Flonase).  Maintain supplemental O2 2L NC, keep SpO2 > 92%, avoid hyperoxia.    #Chronic PE:  On rivaroxaban (Xarelto); restarted per surgery.    #Lung Transplant Evaluation  Ongoing pre-lung transplant workup.  Seen by pulmonary transplant service.  Patient is being considered for inpatient transplant workup per Dr. Wise.  Needs cardiac MRI (requires transition to MRI-compatible pump—pulmonary transplant team working on this).    /Renal:  -no active issues  -replete electrolytes as needed     GI:  #Abdominal Abscess  linezolid 600 mg PO q12h for 10-14 days until 4/16 was dc'ed by ID and switched to daptomycin 6 mg/kg until 4/19  Per general surgery, no acute surgical intervention at this time for abscess.     #constipation  -Continue Miralax BID and senna    Hematology/Onc:  #Iron Deficiency Anemia (JULIA)  -Address once acute issues are stabilized. (No specific plan in the original note).    ID:  #Abdominal Abscess  linezolid 600 mg PO q12h for 10-14 days until 4/16 was dc'ed by ID and switched to daptomycin 6 mg/kg until 4/19  f/u ID recommendations

## 2025-04-12 LAB
ADD ON TEST-SPECIMEN IN LAB: SIGNIFICANT CHANGE UP
ALBUMIN SERPL ELPH-MCNC: 3.4 G/DL — SIGNIFICANT CHANGE UP (ref 3.3–5)
ALP SERPL-CCNC: 59 U/L — SIGNIFICANT CHANGE UP (ref 40–120)
ALT FLD-CCNC: 10 U/L — SIGNIFICANT CHANGE UP (ref 10–45)
ANION GAP SERPL CALC-SCNC: 15 MMOL/L — SIGNIFICANT CHANGE UP (ref 5–17)
APTT BLD: 48.1 SEC — HIGH (ref 24.5–35.6)
AST SERPL-CCNC: 11 U/L — SIGNIFICANT CHANGE UP (ref 10–40)
BILIRUB SERPL-MCNC: 1 MG/DL — SIGNIFICANT CHANGE UP (ref 0.2–1.2)
BUN SERPL-MCNC: 10 MG/DL — SIGNIFICANT CHANGE UP (ref 7–23)
CALCIUM SERPL-MCNC: 8.3 MG/DL — LOW (ref 8.4–10.5)
CHLORIDE SERPL-SCNC: 101 MMOL/L — SIGNIFICANT CHANGE UP (ref 96–108)
CK SERPL-CCNC: 72 U/L — SIGNIFICANT CHANGE UP (ref 25–170)
CO2 SERPL-SCNC: 20 MMOL/L — LOW (ref 22–31)
CREAT SERPL-MCNC: 1.06 MG/DL — SIGNIFICANT CHANGE UP (ref 0.5–1.3)
EGFR: 66 ML/MIN/1.73M2 — SIGNIFICANT CHANGE UP
EGFR: 66 ML/MIN/1.73M2 — SIGNIFICANT CHANGE UP
GLUCOSE SERPL-MCNC: 76 MG/DL — SIGNIFICANT CHANGE UP (ref 70–99)
HCT VFR BLD CALC: 35.5 % — SIGNIFICANT CHANGE UP (ref 34.5–45)
HGB BLD-MCNC: 11.1 G/DL — LOW (ref 11.5–15.5)
INR BLD: 2.38 RATIO — HIGH (ref 0.85–1.16)
MAGNESIUM SERPL-MCNC: 2.1 MG/DL — SIGNIFICANT CHANGE UP (ref 1.6–2.6)
MCHC RBC-ENTMCNC: 22.2 PG — LOW (ref 27–34)
MCHC RBC-ENTMCNC: 31.3 G/DL — LOW (ref 32–36)
MCV RBC AUTO: 71.1 FL — LOW (ref 80–100)
NRBC BLD AUTO-RTO: 0 /100 WBCS — SIGNIFICANT CHANGE UP (ref 0–0)
PHOSPHATE SERPL-MCNC: 3.2 MG/DL — SIGNIFICANT CHANGE UP (ref 2.5–4.5)
PLATELET # BLD AUTO: 385 K/UL — SIGNIFICANT CHANGE UP (ref 150–400)
POTASSIUM SERPL-MCNC: 3.9 MMOL/L — SIGNIFICANT CHANGE UP (ref 3.5–5.3)
POTASSIUM SERPL-SCNC: 3.9 MMOL/L — SIGNIFICANT CHANGE UP (ref 3.5–5.3)
PROT SERPL-MCNC: 6.4 G/DL — SIGNIFICANT CHANGE UP (ref 6–8.3)
PROTHROM AB SERPL-ACNC: 27.1 SEC — HIGH (ref 9.9–13.4)
RBC # BLD: 4.99 M/UL — SIGNIFICANT CHANGE UP (ref 3.8–5.2)
RBC # FLD: 16.6 % — HIGH (ref 10.3–14.5)
SODIUM SERPL-SCNC: 136 MMOL/L — SIGNIFICANT CHANGE UP (ref 135–145)
WBC # BLD: 11.46 K/UL — HIGH (ref 3.8–10.5)
WBC # FLD AUTO: 11.46 K/UL — HIGH (ref 3.8–10.5)

## 2025-04-12 PROCEDURE — 99291 CRITICAL CARE FIRST HOUR: CPT | Mod: GC

## 2025-04-12 PROCEDURE — 71046 X-RAY EXAM CHEST 2 VIEWS: CPT | Mod: 26

## 2025-04-12 PROCEDURE — 99232 SBSQ HOSP IP/OBS MODERATE 35: CPT

## 2025-04-12 PROCEDURE — 99233 SBSQ HOSP IP/OBS HIGH 50: CPT

## 2025-04-12 PROCEDURE — 71045 X-RAY EXAM CHEST 1 VIEW: CPT | Mod: 26

## 2025-04-12 RX ORDER — HEPARIN SODIUM 1000 [USP'U]/ML
7500 INJECTION INTRAVENOUS; SUBCUTANEOUS EVERY 6 HOURS
Refills: 0 | Status: DISCONTINUED | OUTPATIENT
Start: 2025-04-12 | End: 2025-04-12

## 2025-04-12 RX ORDER — DIPHENHYDRAMINE HCL 12.5MG/5ML
25 ELIXIR ORAL ONCE
Refills: 0 | Status: COMPLETED | OUTPATIENT
Start: 2025-04-12 | End: 2025-04-12

## 2025-04-12 RX ORDER — METOCLOPRAMIDE HCL 10 MG
10 TABLET ORAL EVERY 8 HOURS
Refills: 0 | Status: DISCONTINUED | OUTPATIENT
Start: 2025-04-12 | End: 2025-04-24

## 2025-04-12 RX ORDER — KETOROLAC TROMETHAMINE 30 MG/ML
30 INJECTION, SOLUTION INTRAMUSCULAR; INTRAVENOUS EVERY 8 HOURS
Refills: 0 | Status: DISCONTINUED | OUTPATIENT
Start: 2025-04-12 | End: 2025-04-17

## 2025-04-12 RX ORDER — HEPARIN SODIUM 1000 [USP'U]/ML
3500 INJECTION INTRAVENOUS; SUBCUTANEOUS EVERY 6 HOURS
Refills: 0 | Status: DISCONTINUED | OUTPATIENT
Start: 2025-04-12 | End: 2025-04-12

## 2025-04-12 RX ORDER — HEPARIN SODIUM 1000 [USP'U]/ML
INJECTION INTRAVENOUS; SUBCUTANEOUS
Qty: 25000 | Refills: 0 | Status: DISCONTINUED | OUTPATIENT
Start: 2025-04-12 | End: 2025-04-12

## 2025-04-12 RX ORDER — DIPHENHYDRAMINE HCL 12.5MG/5ML
25 ELIXIR ORAL EVERY 8 HOURS
Refills: 0 | Status: DISCONTINUED | OUTPATIENT
Start: 2025-04-12 | End: 2025-04-16

## 2025-04-12 RX ADMIN — Medication 10 MILLIGRAM(S): at 05:09

## 2025-04-12 RX ADMIN — Medication 9 MILLIGRAM(S): at 22:19

## 2025-04-12 RX ADMIN — Medication 10 MILLIGRAM(S): at 13:59

## 2025-04-12 RX ADMIN — FLUTICASONE PROPIONATE 1 SPRAY(S): 50 SPRAY, METERED NASAL at 17:35

## 2025-04-12 RX ADMIN — Medication 1 APPLICATION(S): at 05:20

## 2025-04-12 RX ADMIN — Medication 25 MILLIGRAM(S): at 05:09

## 2025-04-12 RX ADMIN — MIDODRINE HYDROCHLORIDE 20 MILLIGRAM(S): 5 TABLET ORAL at 11:48

## 2025-04-12 RX ADMIN — KETOROLAC TROMETHAMINE 30 MILLIGRAM(S): 30 INJECTION, SOLUTION INTRAMUSCULAR; INTRAVENOUS at 05:09

## 2025-04-12 RX ADMIN — Medication 1 APPLICATION(S): at 11:49

## 2025-04-12 RX ADMIN — KETOROLAC TROMETHAMINE 30 MILLIGRAM(S): 30 INJECTION, SOLUTION INTRAMUSCULAR; INTRAVENOUS at 14:00

## 2025-04-12 RX ADMIN — MONTELUKAST SODIUM 10 MILLIGRAM(S): 10 TABLET ORAL at 11:48

## 2025-04-12 RX ADMIN — Medication 25 MILLIGRAM(S): at 13:59

## 2025-04-12 RX ADMIN — Medication 25 MILLIGRAM(S): at 09:30

## 2025-04-12 RX ADMIN — KETOROLAC TROMETHAMINE 30 MILLIGRAM(S): 30 INJECTION, SOLUTION INTRAMUSCULAR; INTRAVENOUS at 05:30

## 2025-04-12 RX ADMIN — FLUTICASONE PROPIONATE 1 SPRAY(S): 50 SPRAY, METERED NASAL at 05:18

## 2025-04-12 RX ADMIN — KETOROLAC TROMETHAMINE 30 MILLIGRAM(S): 30 INJECTION, SOLUTION INTRAMUSCULAR; INTRAVENOUS at 22:19

## 2025-04-12 RX ADMIN — KETOROLAC TROMETHAMINE 30 MILLIGRAM(S): 30 INJECTION, SOLUTION INTRAMUSCULAR; INTRAVENOUS at 14:22

## 2025-04-12 RX ADMIN — Medication 25 MILLIGRAM(S): at 22:19

## 2025-04-12 RX ADMIN — MIDODRINE HYDROCHLORIDE 20 MILLIGRAM(S): 5 TABLET ORAL at 05:17

## 2025-04-12 RX ADMIN — Medication 10 MILLIGRAM(S): at 22:19

## 2025-04-12 RX ADMIN — MIDODRINE HYDROCHLORIDE 20 MILLIGRAM(S): 5 TABLET ORAL at 17:35

## 2025-04-12 RX ADMIN — Medication 40 MILLIGRAM(S): at 06:12

## 2025-04-12 RX ADMIN — DAPTOMYCIN 124 MILLIGRAM(S): 500 INJECTION, POWDER, LYOPHILIZED, FOR SOLUTION INTRAVENOUS at 11:49

## 2025-04-12 NOTE — PROGRESS NOTE ADULT - ASSESSMENT
43 y/o female, PMH pHTN (on treprostinil infusion), HF (s/p PPM dual chamber 2016), chronic PE (dx 2017) on DOAC, SVT (s/p ablation 05/2020), asthma, h/o MRSA abdominal wall abscesses last 2/'25, and JULIA. was admitted to [facility] 10/13/24-10/29/24 for pre-transplant expedited workup. Sent to ED 4/2/25 from MD for abdominal abscess    CXR (4/2) Perihilar opacities, likely represents enlarged pulmonary arteries. Otherwise clear lungs.    CT A/P (4/2) Peripherally enhancing lesion in the right mid abdominal wall subjacent to diffuse skin thickening measuring 5.0 x 2.1 x 4.0 cm consistent with phlegmon/abscess. Underlying mass is not excluded. Superficial skin thickening in the left abdominal wall at the level of   the umbilicus. Underlying small abscess measures 8 mm.    s/p bedside abdominal wall abscess I&D on 4/3    #MRSA Abdominal Wall Abscess, Hypotension  --General surgery without plans for incision and drainage of the left-sided abdominal wall fluid collection  --Continue daptomycin 600 mg IV every 24 hours. Adding on CPK with AM labs  --Follow up on preliminary blood cultures    #Pre-Lung Transplant Evaluation  COVID19 Nucleocapsid Antibody Negative  COVID19 Rajinder Antibody Positive  HAV IgG Negative  HBVs Ab Negative  HBVsAg Negative  HBVc Ab Negative  HCV Ab Negative  HSV 1 IgG Positive  HSV 2 IgG Negative  EBV IgG Positive  CMV IgG Positive  VZV IgG Positive  Measles IgG Positive  Mumps IgG Positive  Rubella IgG Positive  Quantiferon Gold Negative  HIV Ag/Ab by CMIA Negative  Syphilis Screen Negative  Toxoplasma IgG Negative  Strongyloides Ab Negative  Trypanosoma cruzi Ab Negative  --ID clearance for lung transplant pending treatment of abdominal wall abscess    #Encounter to Vaccinate Patient  COVID19: Would benefit from COVID19 [id number] Vaccine Dose  Influenza: needs this , can be given at pulmonology office, deferring today not to give 2 reactogenic vaccines  RSV: Arexvy 3/6/25  Pneumococcal: States she had pneumococcal vaccination ~2022  HAV: Would benefit from Havrix  HBV: Heplisav Dose 1 3/6/25. Dose 2 of Heplisav prior to discharge  MMR: Rubella IgG pending. Mumps and Measles immune  Varicella: Immune will not require further vaccination  Shingles: Will require Shingrix  Tdap: Will require Tdap    I will continue to follow. Please feel free to contact me with any further questions.    Rogelio Gonsales M.D.  [facility] Division of Infectious Disease  8AM-5PM Monday - Friday: Available on [employer] Teams  After Hours and Holidays (or if no response on [employer] Teams): Please contact the Infectious Diseases Office at [phone]    The above assessment and plan were discussed with SICU Resident

## 2025-04-12 NOTE — PROGRESS NOTE ADULT - ASSESSMENT
45 y/o F h/o PE on Xarelto, pulmonary HTN (likely group I/III; on remodulin/sildenafil), recurrent absceses at site of remodulin, ILD (on home O2), JULIA, obesity (improved), prior AVNRT s/p ablation (2020), bradycardia s/p dual chamber PPM, prior RA clot s/p aspiration (2022) who presented on 4/2 wiht abdominal pain found to have abcess. Requied I&D 4/3 and line switched to arm. Was receiving bumex and became hypotensive. Received fluids. Remains hypovolemic. Undergoing eval for lung transplant.     Pertinent studies:  10/14/24 TTE - nl EF, mod TR, mod-severe RV dysfunction, LVOT VTI 19 cm, IVC 1.4 cm  10/14/24 - CTA - no PE; dilated PA; b/l patchy GGO  7/17/24 - ECG - sinus, biatrial enlargement, RVH, R axis deviation, RV strain  8/16/23 - RA 3, RV 81/3, PA 79/43/57, PCWP 36, LVEDP not obtained; CO/CI 4.3/2.01  7/25/14 - mRCA 40%; /85, RA 13, /27, /52/77, LVEDP 10; Marcela PA 58/30/42 with improvement CI from 1.54 to 2.33  6/2/15 - V/Q scan - low probability of PE

## 2025-04-12 NOTE — PROGRESS NOTE ADULT - PROBLEM SELECTOR PLAN 2
followed by pulm  would change hold parameters for midodrine >100  hold diuretics; encourage hydration; can give gentle fluids if needed  repeat TTE reviewed; no major change  will sign off; please call with any questions no

## 2025-04-12 NOTE — PROVIDER CONTACT NOTE (OTHER) - ACTION/TREATMENT ORDERED:
MD @ bedside to change dressing & apply surgicell wasn't bleeding from MARA, from suture site? which is under anchor device. No replacement anchor device available dressed w available port dressing

## 2025-04-12 NOTE — PROVIDER CONTACT NOTE (OTHER) - ASSESSMENT
Bleeding from under dressing, enough to get on bedsheet and patient gown. No painreported and no swelling. Pt with good pulses/cap refill. Positive blood return
Pt AOx4. VSS on 2L NC. No signs of cp, acute SOB or distress at this time
Pt a&ox4. VSS except BP 88/62. Pt denies lightheadedness or dizziness.
Patient is A&Ox4. Patient feels lightheadedness. Patient denies SOB, chest pain.

## 2025-04-12 NOTE — PROGRESS NOTE ADULT - SUBJECTIVE AND OBJECTIVE BOX
Interval History:  reports headache and N/V  denies complaints    Medications:  acetaminophen     Tablet .. 1000 milliGRAM(s) Oral every 6 hours PRN  acetaminophen   IVPB .. 1000 milliGRAM(s) IV Intermittent once  albuterol    90 MICROgram(s) HFA Inhaler 2 Puff(s) Inhalation every 6 hours PRN  albuterol/ipratropium for Nebulization 3 milliLiter(s) Nebulizer every 6 hours PRN  buDESOnide    Inhalation Suspension 0.5 milliGRAM(s) Inhalation daily  chlorhexidine 2% Cloths 1 Application(s) Topical daily  chlorhexidine 2% Cloths 1 Application(s) Topical <User Schedule>  DAPTOmycin IVPB      DAPTOmycin IVPB 600 milliGRAM(s) IV Intermittent every 24 hours  fluticasone propionate 50 MICROgram(s)/spray Nasal Spray 1 Spray(s) Both Nostrils two times a day  influenza   Vaccine 0.5 milliLiter(s) IntraMuscular once  lidocaine   4% Patch 1 Patch Transdermal daily  lidocaine 1%/epinephrine 1:100,000 Inj 20 milliLiter(s) Local Injection once  meclizine 12.5 milliGRAM(s) Oral once PRN  melatonin 9 milliGRAM(s) Oral at bedtime PRN  midodrine. 20 milliGRAM(s) Oral three times a day  montelukast 10 milliGRAM(s) Oral daily  oxyCODONE    IR 2.5 milliGRAM(s) Oral every 6 hours PRN  pantoprazole    Tablet 40 milliGRAM(s) Oral before breakfast  polyethylene glycol 3350 17 Gram(s) Oral two times a day  remodulin (treprostinil) SubQ infusion 1 Dose(s) 1 Dose(s) SubCutaneous Continuous Pump  rivaroxaban 15 milliGRAM(s) Oral with dinner  senna 2 Tablet(s) Oral at bedtime      Vitals:  T(C): 36.6 (25 @ 20:00), Max: 36.6 (25 @ 17:56)  HR: 75 (25 @ 00:00) (66 - 81)  BP: 93/52 (25 @ 00:00) (83/57 - 151/83)  BP(mean): 68 (25 @ 00:00) (68 - 109)  RR: 17 (25 @ 00:00) (14 - 21)  SpO2: 99% (25 @ 00:00) (98% - 100%)    Daily     Daily Weight in k.8 (2025 17:56)    Weight (kg): 92.8 ( @ 17:56)    I&O's Summary    10 Apr 2025 07:01  -  2025 07:00  --------------------------------------------------------  IN: 480 mL / OUT: 0 mL / NET: 480 mL    2025 07:01  -  2025 01:03  --------------------------------------------------------  IN: 150 mL / OUT: 0 mL / NET: 150 mL        Physical Exam:  Appearance: No Acute Distress  HEENT: PERRL  Neck: No JVD  Cardiovascular: Normal S1 S2, No murmurs/rubs/gallops; prominent P2  Respiratory: Clear to auscultation bilaterally  Gastrointestinal: Soft, Non-tender	  Skin: No cyanosis	  Neurologic: Non-focal  Extremities: No LE edema  Psychiatry: A & O x 3, Mood & affect appropriate    Labs:                        11.1   11.46 )-----------( 385      ( 2025 00:20 )             35.5     04-12    136  |  101  |  10  ----------------------------<  76  3.9   |  20[L]  |  1.06    Ca    8.3[L]      2025 00:20  Phos  3.2     04-12  Mg     2.1     04-12    TPro  6.4  /  Alb  3.4  /  TBili  1.0  /  DBili  x   /  AST  11  /  ALT  10  /  AlkPhos  59  04-12    PT/INR - ( 2025 00:20 )   PT: 27.1 sec;   INR: 2.38 ratio         PTT - ( 2025 00:20 )  PTT:48.1 sec

## 2025-04-12 NOTE — PROGRESS NOTE ADULT - SUBJECTIVE AND OBJECTIVE BOX
Follow Up:      Interval History:    REVIEW OF SYSTEMS  [  ] ROS unobtainable because:    [  ] All other systems negative except as noted below    Constitutional:  [ ] fever [ ] chills  [ ] weight loss  [ ] weakness  Skin:  [ ] rash [ ] phlebitis	  Eyes: [ ] icterus [ ] pain  [ ] discharge	  ENMT: [ ] sore throat  [ ] thrush [ ] ulcers [ ] exudates  Respiratory: [ ] dyspnea [ ] hemoptysis [ ] cough [ ] sputum	  Cardiovascular:  [ ] chest pain [ ] palpitations [ ] edema	  Gastrointestinal:  [ ] nausea [ ] vomiting [ ] diarrhea [ ] constipation [ ] pain	  Genitourinary:  [ ] dysuria [ ] frequency [ ] hematuria [ ] discharge [ ] flank pain  [ ] incontinence  Musculoskeletal:  [ ] myalgias [ ] arthralgias [ ] arthritis  [ ] back pain  Neurological:  [ ] headache [ ] seizures  [ ] confusion/altered mental status    Allergies  vancomycin (Rash)  penicillin (Rash)  adhesives (Rash)        ANTIMICROBIALS:  DAPTOmycin IVPB    DAPTOmycin IVPB 600 every 24 hours      OTHER MEDS:  MEDICATIONS  (STANDING):  acetaminophen     Tablet .. 1000 every 6 hours PRN  acetaminophen   IVPB .. 1000 once  albuterol    90 MICROgram(s) HFA Inhaler 2 every 6 hours PRN  albuterol/ipratropium for Nebulization 3 every 6 hours PRN  buDESOnide    Inhalation Suspension 0.5 daily  diphenhydrAMINE Injectable 25 every 8 hours PRN  influenza   Vaccine 0.5 once  ketorolac   Injectable 30 every 8 hours PRN  meclizine 12.5 once PRN  melatonin 9 at bedtime PRN  metoclopramide 10 every 8 hours PRN  midodrine. 20 three times a day  montelukast 10 daily  oxyCODONE    IR 2.5 every 6 hours PRN  pantoprazole    Tablet 40 before breakfast  polyethylene glycol 3350 17 two times a day  senna 2 at bedtime      Vital Signs Last 24 Hrs  T(C): 36.6 (12 Apr 2025 12:00), Max: 36.8 (12 Apr 2025 04:00)  T(F): 97.9 (12 Apr 2025 12:00), Max: 98.2 (12 Apr 2025 04:00)  HR: 66 (12 Apr 2025 14:00) (66 - 98)  BP: 122/78 (12 Apr 2025 14:00) (92/57 - 151/83)  BP(mean): 96 (12 Apr 2025 14:00) (68 - 109)  RR: 23 (12 Apr 2025 14:00) (11 - 23)  SpO2: 98% (12 Apr 2025 14:00) (96% - 100%)    Parameters below as of 12 Apr 2025 08:00  Patient On (Oxygen Delivery Method): nasal cannula  O2 Flow (L/min): 2      PHYSICAL EXAMINATION:  General: Alert and Awake, NAD  HEENT: PERRL, EOMI  Neck: Supple  Cardiac: RRR, No M/R/G  Resp: CTAB, No Wh/Rh/Ra  Abdomen: NBS, NT/ND, No HSM, No rigidity or guarding  MSK: No LE edema. No Calf tenderness  : No lucas  Skin: No rashes or lesions. Skin is warm and dry to the touch.   Neuro: Alert and Awake. CN 2-12 Grossly intact. Moves all four extremities spontaneously.  Psych: Calm, Pleasant, Cooperative                          11.1   11.46 )-----------( 385      ( 12 Apr 2025 00:20 )             35.5       04-12    136  |  101  |  10  ----------------------------<  76  3.9   |  20[L]  |  1.06    Ca    8.3[L]      12 Apr 2025 00:20  Phos  3.2     04-12  Mg     2.1     04-12    TPro  6.4  /  Alb  3.4  /  TBili  1.0  /  DBili  x   /  AST  11  /  ALT  10  /  AlkPhos  59  04-12      Urinalysis Basic - ( 12 Apr 2025 00:20 )    Color: x / Appearance: x / SG: x / pH: x  Gluc: 76 mg/dL / Ketone: x  / Bili: x / Urobili: x   Blood: x / Protein: x / Nitrite: x   Leuk Esterase: x / RBC: x / WBC x   Sq Epi: x / Non Sq Epi: x / Bacteria: x        MICROBIOLOGY:  v  Blood Blood  04-08-25   No growth at 72 Hours  --  --      Blood Blood  04-08-25   No growth at 72 Hours  --  --      Abscess abdominal wall  04-03-25   Moderate Methicillin Resistant Staphylococcus aureus  --  Methicillin resistant Staphylococcus aureus      Blood Blood-Peripheral  04-02-25   No growth at 5 days  --  --      Blood Blood-Peripheral  04-02-25   No growth at 5 days  --  --        Toxoplasma IgG Screen: <3.00 IU/mL (10-25-24 @ 07:14)  HIV-1 RNA Quantitative, Viral Load Log: NOT DET. lg /mL (10-22-24 @ 17:39)  CMV IgG Antibody: >10.00 U/mL (10-22-24 @ 15:11)          RADIOLOGY:    <The imaging below has been reviewed and visualized by me independently. Findings as detailed in report below> Follow Up:  MRSA Abscess    Interval History: afebrile. moved to ICU for closer monitoring.     REVIEW OF SYSTEMS  [  ] ROS unobtainable because:    [ x ] All other systems negative except as noted below    Constitutional:  [ ] fever [ ] chills  [ ] weight loss  [ ] weakness  Skin:  [ ] rash [ ] phlebitis	  Eyes: [ ] icterus [ ] pain  [ ] discharge	  ENMT: [ ] sore throat  [ ] thrush [ ] ulcers [ ] exudates  Respiratory: [ ] dyspnea [ ] hemoptysis [ ] cough [ ] sputum	  Cardiovascular:  [ ] chest pain [ ] palpitations [ ] edema	  Gastrointestinal:  [ ] nausea [ ] vomiting [ ] diarrhea [ ] constipation [ ] pain	  Genitourinary:  [ ] dysuria [ ] frequency [ ] hematuria [ ] discharge [ ] flank pain  [ ] incontinence  Musculoskeletal:  [ ] myalgias [ ] arthralgias [ ] arthritis  [ ] back pain  Neurological:  [ ] headache [ ] seizures  [ ] confusion/altered mental status    Allergies  vancomycin (Rash)  penicillin (Rash)  adhesives (Rash)        ANTIMICROBIALS:  DAPTOmycin IVPB    DAPTOmycin IVPB 600 every 24 hours      OTHER MEDS:  MEDICATIONS  (STANDING):  acetaminophen     Tablet .. 1000 every 6 hours PRN  acetaminophen   IVPB .. 1000 once  albuterol    90 MICROgram(s) HFA Inhaler 2 every 6 hours PRN  albuterol/ipratropium for Nebulization 3 every 6 hours PRN  buDESOnide    Inhalation Suspension 0.5 daily  diphenhydrAMINE Injectable 25 every 8 hours PRN  influenza   Vaccine 0.5 once  ketorolac   Injectable 30 every 8 hours PRN  meclizine 12.5 once PRN  melatonin 9 at bedtime PRN  metoclopramide 10 every 8 hours PRN  midodrine. 20 three times a day  montelukast 10 daily  oxyCODONE    IR 2.5 every 6 hours PRN  pantoprazole    Tablet 40 before breakfast  polyethylene glycol 3350 17 two times a day  senna 2 at bedtime      Vital Signs Last 24 Hrs  T(C): 36.6 (12 Apr 2025 12:00), Max: 36.8 (12 Apr 2025 04:00)  T(F): 97.9 (12 Apr 2025 12:00), Max: 98.2 (12 Apr 2025 04:00)  HR: 66 (12 Apr 2025 14:00) (66 - 98)  BP: 122/78 (12 Apr 2025 14:00) (92/57 - 151/83)  BP(mean): 96 (12 Apr 2025 14:00) (68 - 109)  RR: 23 (12 Apr 2025 14:00) (11 - 23)  SpO2: 98% (12 Apr 2025 14:00) (96% - 100%)    Parameters below as of 12 Apr 2025 08:00  Patient On (Oxygen Delivery Method): nasal cannula  O2 Flow (L/min): 2    PHYSICAL EXAMINATION:  General: Alert and Awake, NAD  HEENT: Normocephalic / Atraumatic  Resp: No accessory muscles of respiration utilized  Abdomen: +left sided prior infusion pump site with no further drainage but with area of induration. right sided abdominal wall abscess with resolution of induration / fluctuance but still with purulent drainage from packing site. NBS, No HSM, No rigidity or guarding  MSK: No LE edema.   : No lucas  Skin: No rashes or lesions.    Neuro: Alert and Awake. CN 2-12 Grossly intact. Moves all four extremities spontaneously.  Psych: Calm, Pleasant, Cooperative                            11.1   11.46 )-----------( 385      ( 12 Apr 2025 00:20 )             35.5       04-12    136  |  101  |  10  ----------------------------<  76  3.9   |  20[L]  |  1.06    Ca    8.3[L]      12 Apr 2025 00:20  Phos  3.2     04-12  Mg     2.1     04-12    TPro  6.4  /  Alb  3.4  /  TBili  1.0  /  DBili  x   /  AST  11  /  ALT  10  /  AlkPhos  59  04-12      Urinalysis Basic - ( 12 Apr 2025 00:20 )    Color: x / Appearance: x / SG: x / pH: x  Gluc: 76 mg/dL / Ketone: x  / Bili: x / Urobili: x   Blood: x / Protein: x / Nitrite: x   Leuk Esterase: x / RBC: x / WBC x   Sq Epi: x / Non Sq Epi: x / Bacteria: x        MICROBIOLOGY:  v  Blood Blood  04-08-25   No growth at 72 Hours  --  --      Blood Blood  04-08-25   No growth at 72 Hours  --  --      Abscess abdominal wall  04-03-25   Moderate Methicillin Resistant Staphylococcus aureus  --  Methicillin resistant Staphylococcus aureus      Blood Blood-Peripheral  04-02-25   No growth at 5 days  --  --      Blood Blood-Peripheral  04-02-25   No growth at 5 days  --  --        Toxoplasma IgG Screen: <3.00 IU/mL (10-25-24 @ 07:14)  HIV-1 RNA Quantitative, Viral Load Log: NOT DET. lg /mL (10-22-24 @ 17:39)  CMV IgG Antibody: >10.00 U/mL (10-22-24 @ 15:11)          RADIOLOGY:    <The imaging below has been reviewed and visualized by me independently. Findings as detailed in report below>    < from: Xray Chest 1 View- PORTABLE-Urgent (Xray Chest 1 View- PORTABLE-Urgent .) (04.12.25 @ 10:25) >  IMPRESSION:  Left midline catheter terminates at the left axilla.    < end of copied text >

## 2025-04-12 NOTE — PROGRESS NOTE ADULT - ATTENDING COMMENTS
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 was admitted after being sent in from outpatient pulmonologist, for concern of worsening abdominal abscess in proximity to medication pump that has been unresponsive to doxycyline.    Had I&D of the right sided abscess. Surgery does not think the LLQ collection is an abscess and does not recommend drainage. At this time, while source control would be ideal given transplant listing pending, we will continue medical management with antibiotics. Transplant ID is following. In addition, she needs a cMRI and Abdominal MRI to for transplant work up. She does not have central access for intravenous pulmonary vasodilators, and with possible active infection, is not cleared to get one. Her remodulin pump is not MRI compatible. After extensive disucssion with pHTN, transplant teams, we will obtain MRI one at a time, off the pump given half life of remodulin is 4 hours and monitor her to ensure she does not get hypotensive. Continue midodrine. Will continue to optimize pHTN. In addition, as per disucssion with pHTN team, the indication of AC is soft as it was for a previous UE DVT. Given she may require procedures, they recommended holding ac for now. Discussed with patient at length. She is critically ill and intermittently has required vasopressors. Overall prognosis is guarded. Frequent bedside visits and discussion with consulting teams.

## 2025-04-12 NOTE — PROGRESS NOTE ADULT - ASSESSMENT
Assessment and Plan:    Neurologic:  -AAOx3    #migraines  -Headache cocktail per neuro: "-First line: recommend migraine medications (to be given all together at the same time if no contraindications form comorbitities): ketorolac (Toradol) 30mg IV q8h PRN (or acetaminophen 1g IV q8h PRN), metoclopramide (Reglan) 10mg q8h PRN, diphenhydramine (Benadryl) 25mg IV q8h PRN. Please repeat at least 2-3 cycles for medications to be effective."  -Currently managed with oxycodone PRN    #peripheral vertigo  -meclizine 12.5 mg PRN     Cardiovascular:  #Hypotension:  -Hold bumetanide, spironolactone, sildenafil, and nifedipine.  -Start midodrine 20 mg TID.  -Heart failure consult obtained.    #Right Heart Failure:  -Hold bumetanide, spironolactone, and nifedipine.    #Pulmonary Hypertension:  -Continue home Remodulin 51 ng/kg/min.  -PICC line in place by IR on 4/11  -Check BNP, TTE.  -Pulmonology following.    #Chronic PE  -Restart rivaroxaban (Xarelto) per surgery recommendations.    Pulmonary:  #Pulmonary Hypertension:  -Continue home Remodulin 51 ng/kg/min.  -PICC line in place by IR on 4/11  -Check BNP, TTE.  -Pulmonology following.    #Asthma  Continue home ipratropium (Atrovent) nebulizer PRN.  Continue home budesonide/formoterol (Pulmicort), tiotropium (Spiriva), and PRN nebulizers.  Continue fluticasone (Flonase).  Maintain supplemental O2 2L NC, keep SpO2 > 92%, avoid hyperoxia.    #Chronic PE:  On rivaroxaban (Xarelto); restarted per surgery.    #Lung Transplant Evaluation  Ongoing pre-lung transplant workup.  Seen by pulmonary transplant service.  Patient is being considered for inpatient transplant workup per Dr. Wise.  Needs cardiac MRI (requires transition to MRI-compatible pump—pulmonary transplant team working on this).    /Renal:  -no active issues  -replete electrolytes as needed     GI:  #Abdominal Abscess  linezolid 600 mg PO q12h for 10-14 days until 4/16 was dc'ed by ID and switched to daptomycin 6 mg/kg until 4/19  Per general surgery, no acute surgical intervention at this time for abscess.     #constipation  -Continue Miralax BID and senna    Hematology/Onc:  #Iron Deficiency Anemia (JULIA)  -Address once acute issues are stabilized. (No specific plan in the original note).    ID:  #Abdominal Abscess  linezolid 600 mg PO q12h for 10-14 days until 4/16 was dc'ed by ID and switched to daptomycin 6 mg/kg until 4/19  f/u ID recommendations.   ************************  Amirah Rowe,    Emergency Medicine PGY-1  ************************    Patient is a 44y old  Female who presents with a chief complaint of abdom abscess (12 Apr 2025 01:03)    Overnight bleeding from midline site, dressing replaced.     MEDICATIONS  (STANDING):  acetaminophen   IVPB .. 1000 milliGRAM(s) IV Intermittent once  buDESOnide    Inhalation Suspension 0.5 milliGRAM(s) Inhalation daily  chlorhexidine 2% Cloths 1 Application(s) Topical daily  chlorhexidine 2% Cloths 1 Application(s) Topical <User Schedule>  DAPTOmycin IVPB      DAPTOmycin IVPB 600 milliGRAM(s) IV Intermittent every 24 hours  fluticasone propionate 50 MICROgram(s)/spray Nasal Spray 1 Spray(s) Both Nostrils two times a day  influenza   Vaccine 0.5 milliLiter(s) IntraMuscular once  lidocaine   4% Patch 1 Patch Transdermal daily  midodrine. 20 milliGRAM(s) Oral three times a day  montelukast 10 milliGRAM(s) Oral daily  pantoprazole    Tablet 40 milliGRAM(s) Oral before breakfast  polyethylene glycol 3350 17 Gram(s) Oral two times a day  remodulin (treprostinil) SubQ infusion 1 Dose(s) 1 Dose(s) SubCutaneous Continuous Pump  senna 2 Tablet(s) Oral at bedtime    MEDICATIONS  (PRN):  acetaminophen     Tablet .. 1000 milliGRAM(s) Oral every 6 hours PRN Mild Pain (1 - 3), Moderate Pain (4 - 6)  albuterol    90 MICROgram(s) HFA Inhaler 2 Puff(s) Inhalation every 6 hours PRN Shortness of Breath  albuterol/ipratropium for Nebulization 3 milliLiter(s) Nebulizer every 6 hours PRN Shortness of Breath  diphenhydrAMINE Injectable 25 milliGRAM(s) IV Push every 8 hours PRN Migraine  ketorolac   Injectable 30 milliGRAM(s) IV Push every 8 hours PRN Migraine  meclizine 12.5 milliGRAM(s) Oral once PRN Dizziness  melatonin 9 milliGRAM(s) Oral at bedtime PRN Insomnia  metoclopramide 10 milliGRAM(s) Oral every 8 hours PRN Migraine  oxyCODONE    IR 2.5 milliGRAM(s) Oral every 6 hours PRN Severe Pain (7 - 10)      CAPILLARY BLOOD GLUCOSE        I&O's Summary    11 Apr 2025 07:01  -  12 Apr 2025 07:00  --------------------------------------------------------  IN: 150 mL / OUT: 300 mL / NET: -150 mL    12 Apr 2025 07:01  -  12 Apr 2025 12:16  --------------------------------------------------------  IN: 100 mL / OUT: 0 mL / NET: 100 mL        PHYSICAL EXAM:  Vital Signs Last 24 Hrs  T(C): 36.7 (12 Apr 2025 08:00), Max: 36.8 (12 Apr 2025 04:00)  T(F): 98.1 (12 Apr 2025 08:00), Max: 98.2 (12 Apr 2025 04:00)  HR: 72 (12 Apr 2025 11:00) (66 - 98)  BP: 109/63 (12 Apr 2025 11:00) (92/57 - 151/83)  BP(mean): 80 (12 Apr 2025 11:00) (68 - 109)  RR: 19 (12 Apr 2025 11:00) (11 - 21)  SpO2: 98% (12 Apr 2025 11:00) (96% - 100%)    Parameters below as of 12 Apr 2025 08:00  Patient On (Oxygen Delivery Method): nasal cannula  O2 Flow (L/min): 2      PHYSICAL EXAM:  GENERAL: NAD, lying in bed comfortably  HEENT: NC/AT  NECK: Supple, No JVD  CHEST/LUNG: CTAB, no increased WOB  HEART: RRR, no m/r/g  ABDOMEN: soft, NT, ND, BS+  EXTREMITIES:  2+ peripheral pulses, no edema  NERVOUS SYSTEM:  A&Ox3  MSK: FROM all 4 extremities, full and equal strength  SKIN: No rashes or lesions    LABS:                        11.1   11.46 )-----------( 385      ( 12 Apr 2025 00:20 )             35.5     04-12    136  |  101  |  10  ----------------------------<  76  3.9   |  20[L]  |  1.06    Ca    8.3[L]      12 Apr 2025 00:20  Phos  3.2     04-12  Mg     2.1     04-12    TPro  6.4  /  Alb  3.4  /  TBili  1.0  /  DBili  x   /  AST  11  /  ALT  10  /  AlkPhos  59  04-12    PT/INR - ( 12 Apr 2025 00:20 )   PT: 27.1 sec;   INR: 2.38 ratio         PTT - ( 12 Apr 2025 00:20 )  PTT:48.1 sec      Urinalysis Basic - ( 12 Apr 2025 00:20 )    Color: x / Appearance: x / SG: x / pH: x  Gluc: 76 mg/dL / Ketone: x  / Bili: x / Urobili: x   Blood: x / Protein: x / Nitrite: x   Leuk Esterase: x / RBC: x / WBC x   Sq Epi: x / Non Sq Epi: x / Bacteria: x            Assessment and Plan:    Neurologic:  -AAOx3    #migraines  -Headache cocktail per neuro: "-First line: recommend migraine medications (to be given all together at the same time if no contraindications form comorbitities): ketorolac (Toradol) 30mg IV q8h PRN (or acetaminophen 1g IV q8h PRN), metoclopramide (Reglan) 10mg q8h PRN, diphenhydramine (Benadryl) 25mg IV q8h PRN. Please repeat at least 2-3 cycles for medications to be effective."  -Currently managed with oxycodone PRN    #peripheral vertigo  -meclizine 12.5 mg PRN     Cardiovascular:  #Hypotension:  -Hold bumetanide, spironolactone, sildenafil, and nifedipine.  -midodrine 20 mg TID.  -Heart failure signing off as repeat TTE similar to prior     #Right Heart Failure:  -Hold bumetanide, spironolactone, and nifedipine.  -Heart failure signing off as repeat TTE similar to prior       #Pulmonary Hypertension:  -Continue home Remodulin subQ 51 ng/kg/min.  -midline in place by IR on 4/11  -Check BNP  -TTE similar to prior   -Pulmonology following    #Chronic PE  - Xarelto dc'ed in anticipation potential I&D    Pulmonary:  #Pulmonary Hypertension:  -Continue home Remodulin subQ 51 ng/kg/min.  -midline in place by IR on 4/11  -Check BNP  -TTE similar to prior   -Pulmonology following    #Asthma  Continue home ipratropium (Atrovent) nebulizer PRN.  Continue home budesonide/formoterol (Pulmicort), tiotropium (Spiriva), and PRN nebulizers.  Continue fluticasone (Flonase).  Maintain supplemental O2 2L NC, keep SpO2 > 92%, avoid hyperoxia.    #Chronic PE:  - Xarelto dc'ed in anticipation potential I&D    #Lung Transplant Evaluation  Ongoing pre-lung transplant workup.  Transplant Pulm requesting LLQ abscess I&D for medical optimization for lung transplant candidacy   Seen by pulmonary transplant service.  Patient is being considered for inpatient transplant workup per Dr. Wise.  Needs cardiac MRI (requires transition to MRI-compatible pump—pulmonary transplant team working on this).  f/u with MRI regarding cardiac MRI time/date     /Renal:  -no active issues  -replete electrolytes as needed     GI:  #Abdominal Abscess  linezolid 600 mg PO q12h for 10-14 days until 4/16 was dc'ed by ID and switched to daptomycin 6 mg/kg until 4/19  Per general surgery, no acute surgical intervention at this time for abscess.   Transplant Pulm requesting LLQ abscess I&D for medical optimization for lung transplant candidacy   f/u with surgery regarding potential I&D    #constipation  -Continue Miralax BID and senna    Hematology/Onc:  #Iron Deficiency Anemia (JULIA)  -Address once acute issues are stabilized. (No specific plan in the original note).    ID:  #Abdominal Abscess  linezolid 600 mg PO q12h for 10-14 days until 4/16 was dc'ed by ID and switched to daptomycin 6 mg/kg until 4/19  f/u ID recommendations.   ************************  Amirah Rowe,    Emergency Medicine PGY-1  ************************    Patient is a 44y old  Female who presents with a chief complaint of abdom abscess (12 Apr 2025 01:03)    Overnight bleeding from midline site, dressing replaced.     MEDICATIONS  (STANDING):  acetaminophen   IVPB .. 1000 milliGRAM(s) IV Intermittent once  buDESOnide    Inhalation Suspension 0.5 milliGRAM(s) Inhalation daily  chlorhexidine 2% Cloths 1 Application(s) Topical daily  chlorhexidine 2% Cloths 1 Application(s) Topical <User Schedule>  DAPTOmycin IVPB      DAPTOmycin IVPB 600 milliGRAM(s) IV Intermittent every 24 hours  fluticasone propionate 50 MICROgram(s)/spray Nasal Spray 1 Spray(s) Both Nostrils two times a day  influenza   Vaccine 0.5 milliLiter(s) IntraMuscular once  lidocaine   4% Patch 1 Patch Transdermal daily  midodrine. 20 milliGRAM(s) Oral three times a day  montelukast 10 milliGRAM(s) Oral daily  pantoprazole    Tablet 40 milliGRAM(s) Oral before breakfast  polyethylene glycol 3350 17 Gram(s) Oral two times a day  remodulin (treprostinil) SubQ infusion 1 Dose(s) 1 Dose(s) SubCutaneous Continuous Pump  senna 2 Tablet(s) Oral at bedtime    MEDICATIONS  (PRN):  acetaminophen     Tablet .. 1000 milliGRAM(s) Oral every 6 hours PRN Mild Pain (1 - 3), Moderate Pain (4 - 6)  albuterol    90 MICROgram(s) HFA Inhaler 2 Puff(s) Inhalation every 6 hours PRN Shortness of Breath  albuterol/ipratropium for Nebulization 3 milliLiter(s) Nebulizer every 6 hours PRN Shortness of Breath  diphenhydrAMINE Injectable 25 milliGRAM(s) IV Push every 8 hours PRN Migraine  ketorolac   Injectable 30 milliGRAM(s) IV Push every 8 hours PRN Migraine  meclizine 12.5 milliGRAM(s) Oral once PRN Dizziness  melatonin 9 milliGRAM(s) Oral at bedtime PRN Insomnia  metoclopramide 10 milliGRAM(s) Oral every 8 hours PRN Migraine  oxyCODONE    IR 2.5 milliGRAM(s) Oral every 6 hours PRN Severe Pain (7 - 10)      CAPILLARY BLOOD GLUCOSE        I&O's Summary    11 Apr 2025 07:01  -  12 Apr 2025 07:00  --------------------------------------------------------  IN: 150 mL / OUT: 300 mL / NET: -150 mL    12 Apr 2025 07:01  -  12 Apr 2025 12:16  --------------------------------------------------------  IN: 100 mL / OUT: 0 mL / NET: 100 mL        PHYSICAL EXAM:  Vital Signs Last 24 Hrs  T(C): 36.7 (12 Apr 2025 08:00), Max: 36.8 (12 Apr 2025 04:00)  T(F): 98.1 (12 Apr 2025 08:00), Max: 98.2 (12 Apr 2025 04:00)  HR: 72 (12 Apr 2025 11:00) (66 - 98)  BP: 109/63 (12 Apr 2025 11:00) (92/57 - 151/83)  BP(mean): 80 (12 Apr 2025 11:00) (68 - 109)  RR: 19 (12 Apr 2025 11:00) (11 - 21)  SpO2: 98% (12 Apr 2025 11:00) (96% - 100%)    Parameters below as of 12 Apr 2025 08:00  Patient On (Oxygen Delivery Method): nasal cannula  O2 Flow (L/min): 2      PHYSICAL EXAM:  GENERAL: NAD, lying in bed comfortably  HEENT: NC/AT  NECK: Supple, No JVD  CHEST/LUNG: CTAB, no increased WOB  HEART: RRR, no m/r/g  ABDOMEN: soft, NT, ND, BS+  EXTREMITIES:  2+ peripheral pulses, no edema  NERVOUS SYSTEM:  A&Ox3  MSK: FROM all 4 extremities, full and equal strength  SKIN: No rashes or lesions    LABS:                        11.1   11.46 )-----------( 385      ( 12 Apr 2025 00:20 )             35.5     04-12    136  |  101  |  10  ----------------------------<  76  3.9   |  20[L]  |  1.06    Ca    8.3[L]      12 Apr 2025 00:20  Phos  3.2     04-12  Mg     2.1     04-12    TPro  6.4  /  Alb  3.4  /  TBili  1.0  /  DBili  x   /  AST  11  /  ALT  10  /  AlkPhos  59  04-12    PT/INR - ( 12 Apr 2025 00:20 )   PT: 27.1 sec;   INR: 2.38 ratio         PTT - ( 12 Apr 2025 00:20 )  PTT:48.1 sec      Urinalysis Basic - ( 12 Apr 2025 00:20 )    Color: x / Appearance: x / SG: x / pH: x  Gluc: 76 mg/dL / Ketone: x  / Bili: x / Urobili: x   Blood: x / Protein: x / Nitrite: x   Leuk Esterase: x / RBC: x / WBC x   Sq Epi: x / Non Sq Epi: x / Bacteria: x            Assessment and Plan:    Neurologic:  -AAOx3    #migraines  -Headache cocktail per neuro: "-First line: recommend migraine medications (to be given all together at the same time if no contraindications form comorbitities): ketorolac (Toradol) 30mg IV q8h PRN (or acetaminophen 1g IV q8h PRN), metoclopramide (Reglan) 10mg q8h PRN, diphenhydramine (Benadryl) 25mg IV q8h PRN. Please repeat at least 2-3 cycles for medications to be effective."  -Currently managed with oxycodone PRN    #peripheral vertigo  -meclizine 12.5 mg PRN     Cardiovascular:  #Hypotension:  -Hold bumetanide, spironolactone, sildenafil, and nifedipine.  -midodrine 20 mg TID.  -Heart failure signing off as repeat TTE similar to prior     #Right Heart Failure:  -Hold bumetanide, spironolactone, and nifedipine.  -Heart failure signing off as repeat TTE similar to prior       #Pulmonary Hypertension:  -Continue home Remodulin subQ 51 ng/kg/min.  -midline in place by IR on 4/11  -Check BNP  -TTE similar to prior   -Pulmonology following    #Chronic PE  - Xarelto dc'ed in anticipation potential I&D    Pulmonary:  #Pulmonary Hypertension:  -Continue home Remodulin subQ 51 ng/kg/min.  -midline in place by IR on 4/11  -Check BNP  -TTE similar to prior   -Pulmonology following    #Asthma  Continue home ipratropium (Atrovent) nebulizer PRN.  Continue home budesonide/formoterol (Pulmicort), tiotropium (Spiriva), and PRN nebulizers.  Continue fluticasone (Flonase).  Maintain supplemental O2 2L NC, keep SpO2 > 92%, avoid hyperoxia.    #Chronic PE:  - Xarelto dc'ed in anticipation potential I&D    #Lung Transplant Evaluation  Ongoing pre-lung transplant workup.  Transplant Pulm requesting LLQ abscess I&D for medical optimization for lung transplant candidacy   Seen by pulmonary transplant service.  Patient is being considered for inpatient transplant workup per Dr. Wise.  Needs cardiac MRI (requires transition to MRI-compatible pump—pulmonary transplant team working on this).  cardiac MRI on 4/14 @ 10:00 am     /Renal:  -no active issues  -replete electrolytes as needed     GI:  #Abdominal Abscess  linezolid 600 mg PO q12h for 10-14 days until 4/16 was dc'ed by ID and switched to daptomycin 6 mg/kg until 4/19  Per general surgery, no acute surgical intervention at this time for abscess.   Transplant Pulm requesting LLQ abscess I&D for medical optimization for lung transplant candidacy   f/u with surgery regarding potential I&D    #constipation  -Continue Miralax BID and senna    Hematology/Onc:  #Iron Deficiency Anemia (JULIA)  -Address once acute issues are stabilized. (No specific plan in the original note).    ID:  #Abdominal Abscess  linezolid 600 mg PO q12h for 10-14 days until 4/16 was dc'ed by ID and switched to daptomycin 6 mg/kg until 4/19  f/u ID recommendations.

## 2025-04-12 NOTE — PROVIDER CONTACT NOTE (OTHER) - BACKGROUND
Pt with new midline placed today in IR.
Pt admitted for abdominal abscess. PMH pHTN, HF, chronic PE, SVT, asthma, MRSA abdominal wall abscess.
Patient admitted for cellulitis
Pt diagnosed with abdominal abcess

## 2025-04-12 NOTE — PROVIDER CONTACT NOTE (OTHER) - REASON
BP 88/ 62 manual
BP 88/62
Bleeding from newly placed midline site
Pt verbalized headache, nausea room spinning

## 2025-04-13 LAB
ALBUMIN SERPL ELPH-MCNC: 3.7 G/DL — SIGNIFICANT CHANGE UP (ref 3.3–5)
ALBUMIN SERPL ELPH-MCNC: 3.8 G/DL — SIGNIFICANT CHANGE UP (ref 3.3–5)
ALP SERPL-CCNC: 62 U/L — SIGNIFICANT CHANGE UP (ref 40–120)
ALP SERPL-CCNC: 63 U/L — SIGNIFICANT CHANGE UP (ref 40–120)
ALT FLD-CCNC: 11 U/L — SIGNIFICANT CHANGE UP (ref 10–45)
ALT FLD-CCNC: 7 U/L — LOW (ref 10–45)
ANION GAP SERPL CALC-SCNC: 12 MMOL/L — SIGNIFICANT CHANGE UP (ref 5–17)
ANION GAP SERPL CALC-SCNC: 15 MMOL/L — SIGNIFICANT CHANGE UP (ref 5–17)
APTT BLD: 39.6 SEC — HIGH (ref 24.5–35.6)
APTT BLD: 43.7 SEC — HIGH (ref 24.5–35.6)
AST SERPL-CCNC: 10 U/L — SIGNIFICANT CHANGE UP (ref 10–40)
AST SERPL-CCNC: 11 U/L — SIGNIFICANT CHANGE UP (ref 10–40)
BILIRUB SERPL-MCNC: 0.8 MG/DL — SIGNIFICANT CHANGE UP (ref 0.2–1.2)
BILIRUB SERPL-MCNC: 0.8 MG/DL — SIGNIFICANT CHANGE UP (ref 0.2–1.2)
BUN SERPL-MCNC: 11 MG/DL — SIGNIFICANT CHANGE UP (ref 7–23)
BUN SERPL-MCNC: 13 MG/DL — SIGNIFICANT CHANGE UP (ref 7–23)
CALCIUM SERPL-MCNC: 8.4 MG/DL — SIGNIFICANT CHANGE UP (ref 8.4–10.5)
CALCIUM SERPL-MCNC: 8.5 MG/DL — SIGNIFICANT CHANGE UP (ref 8.4–10.5)
CHLORIDE SERPL-SCNC: 102 MMOL/L — SIGNIFICANT CHANGE UP (ref 96–108)
CHLORIDE SERPL-SCNC: 103 MMOL/L — SIGNIFICANT CHANGE UP (ref 96–108)
CO2 SERPL-SCNC: 20 MMOL/L — LOW (ref 22–31)
CO2 SERPL-SCNC: 22 MMOL/L — SIGNIFICANT CHANGE UP (ref 22–31)
CREAT SERPL-MCNC: 1.06 MG/DL — SIGNIFICANT CHANGE UP (ref 0.5–1.3)
CREAT SERPL-MCNC: 1.18 MG/DL — SIGNIFICANT CHANGE UP (ref 0.5–1.3)
CULTURE RESULTS: SIGNIFICANT CHANGE UP
CULTURE RESULTS: SIGNIFICANT CHANGE UP
EGFR: 58 ML/MIN/1.73M2 — LOW
EGFR: 58 ML/MIN/1.73M2 — LOW
EGFR: 66 ML/MIN/1.73M2 — SIGNIFICANT CHANGE UP
EGFR: 66 ML/MIN/1.73M2 — SIGNIFICANT CHANGE UP
GAS PNL BLDV: SIGNIFICANT CHANGE UP
GLUCOSE SERPL-MCNC: 100 MG/DL — HIGH (ref 70–99)
GLUCOSE SERPL-MCNC: 84 MG/DL — SIGNIFICANT CHANGE UP (ref 70–99)
HCT VFR BLD CALC: 33.8 % — LOW (ref 34.5–45)
HCT VFR BLD CALC: 34.3 % — LOW (ref 34.5–45)
HGB BLD-MCNC: 10.8 G/DL — LOW (ref 11.5–15.5)
HGB BLD-MCNC: 11 G/DL — LOW (ref 11.5–15.5)
INR BLD: 1.19 RATIO — HIGH (ref 0.85–1.16)
INR BLD: 1.31 RATIO — HIGH (ref 0.85–1.16)
MAGNESIUM SERPL-MCNC: 2.1 MG/DL — SIGNIFICANT CHANGE UP (ref 1.6–2.6)
MAGNESIUM SERPL-MCNC: 2.1 MG/DL — SIGNIFICANT CHANGE UP (ref 1.6–2.6)
MCHC RBC-ENTMCNC: 22.5 PG — LOW (ref 27–34)
MCHC RBC-ENTMCNC: 22.6 PG — LOW (ref 27–34)
MCHC RBC-ENTMCNC: 32 G/DL — SIGNIFICANT CHANGE UP (ref 32–36)
MCHC RBC-ENTMCNC: 32.1 G/DL — SIGNIFICANT CHANGE UP (ref 32–36)
MCV RBC AUTO: 70.4 FL — LOW (ref 80–100)
MCV RBC AUTO: 70.4 FL — LOW (ref 80–100)
NRBC BLD AUTO-RTO: 0 /100 WBCS — SIGNIFICANT CHANGE UP (ref 0–0)
NRBC BLD AUTO-RTO: 0 /100 WBCS — SIGNIFICANT CHANGE UP (ref 0–0)
PHOSPHATE SERPL-MCNC: 2.9 MG/DL — SIGNIFICANT CHANGE UP (ref 2.5–4.5)
PHOSPHATE SERPL-MCNC: 3 MG/DL — SIGNIFICANT CHANGE UP (ref 2.5–4.5)
PLATELET # BLD AUTO: 337 K/UL — SIGNIFICANT CHANGE UP (ref 150–400)
PLATELET # BLD AUTO: 339 K/UL — SIGNIFICANT CHANGE UP (ref 150–400)
POTASSIUM SERPL-MCNC: 4 MMOL/L — SIGNIFICANT CHANGE UP (ref 3.5–5.3)
POTASSIUM SERPL-MCNC: 4.3 MMOL/L — SIGNIFICANT CHANGE UP (ref 3.5–5.3)
POTASSIUM SERPL-SCNC: 4 MMOL/L — SIGNIFICANT CHANGE UP (ref 3.5–5.3)
POTASSIUM SERPL-SCNC: 4.3 MMOL/L — SIGNIFICANT CHANGE UP (ref 3.5–5.3)
PROT SERPL-MCNC: 6.4 G/DL — SIGNIFICANT CHANGE UP (ref 6–8.3)
PROT SERPL-MCNC: 6.8 G/DL — SIGNIFICANT CHANGE UP (ref 6–8.3)
PROTHROM AB SERPL-ACNC: 13.7 SEC — HIGH (ref 9.9–13.4)
PROTHROM AB SERPL-ACNC: 14.9 SEC — HIGH (ref 9.9–13.4)
RBC # BLD: 4.8 M/UL — SIGNIFICANT CHANGE UP (ref 3.8–5.2)
RBC # BLD: 4.87 M/UL — SIGNIFICANT CHANGE UP (ref 3.8–5.2)
RBC # FLD: 16.3 % — HIGH (ref 10.3–14.5)
RBC # FLD: 16.3 % — HIGH (ref 10.3–14.5)
SODIUM SERPL-SCNC: 137 MMOL/L — SIGNIFICANT CHANGE UP (ref 135–145)
SODIUM SERPL-SCNC: 137 MMOL/L — SIGNIFICANT CHANGE UP (ref 135–145)
SPECIMEN SOURCE: SIGNIFICANT CHANGE UP
SPECIMEN SOURCE: SIGNIFICANT CHANGE UP
WBC # BLD: 11.69 K/UL — HIGH (ref 3.8–10.5)
WBC # BLD: 9.61 K/UL — SIGNIFICANT CHANGE UP (ref 3.8–10.5)
WBC # FLD AUTO: 11.69 K/UL — HIGH (ref 3.8–10.5)
WBC # FLD AUTO: 9.61 K/UL — SIGNIFICANT CHANGE UP (ref 3.8–10.5)

## 2025-04-13 PROCEDURE — 99233 SBSQ HOSP IP/OBS HIGH 50: CPT | Mod: GC

## 2025-04-13 PROCEDURE — 99291 CRITICAL CARE FIRST HOUR: CPT | Mod: GC,25

## 2025-04-13 PROCEDURE — 10005 FNA BX W/US GDN 1ST LES: CPT

## 2025-04-13 RX ORDER — DIPHENHYDRAMINE HCL 12.5MG/5ML
50 ELIXIR ORAL ONCE
Refills: 0 | Status: COMPLETED | OUTPATIENT
Start: 2025-04-13 | End: 2025-04-13

## 2025-04-13 RX ORDER — HYDROMORPHONE/SOD CHLOR,ISO/PF 2 MG/10 ML
0.25 SYRINGE (ML) INJECTION ONCE
Refills: 0 | Status: DISCONTINUED | OUTPATIENT
Start: 2025-04-13 | End: 2025-04-13

## 2025-04-13 RX ORDER — LIDOCAINE HCL/PF 10 MG/ML
20 VIAL (ML) INJECTION ONCE
Refills: 0 | Status: COMPLETED | OUTPATIENT
Start: 2025-04-13 | End: 2025-04-13

## 2025-04-13 RX ORDER — HEPARIN SODIUM 1000 [USP'U]/ML
5000 INJECTION INTRAVENOUS; SUBCUTANEOUS EVERY 8 HOURS
Refills: 0 | Status: DISCONTINUED | OUTPATIENT
Start: 2025-04-14 | End: 2025-04-14

## 2025-04-13 RX ORDER — DIPHENHYDRAMINE HCL 12.5MG/5ML
25 ELIXIR ORAL ONCE
Refills: 0 | Status: COMPLETED | OUTPATIENT
Start: 2025-04-13 | End: 2025-04-13

## 2025-04-13 RX ADMIN — KETOROLAC TROMETHAMINE 30 MILLIGRAM(S): 30 INJECTION, SOLUTION INTRAMUSCULAR; INTRAVENOUS at 11:45

## 2025-04-13 RX ADMIN — Medication 25 MILLIGRAM(S): at 14:31

## 2025-04-13 RX ADMIN — FLUTICASONE PROPIONATE 1 SPRAY(S): 50 SPRAY, METERED NASAL at 17:08

## 2025-04-13 RX ADMIN — KETOROLAC TROMETHAMINE 30 MILLIGRAM(S): 30 INJECTION, SOLUTION INTRAMUSCULAR; INTRAVENOUS at 22:31

## 2025-04-13 RX ADMIN — Medication 9 MILLIGRAM(S): at 21:58

## 2025-04-13 RX ADMIN — Medication 1 APPLICATION(S): at 11:46

## 2025-04-13 RX ADMIN — FLUTICASONE PROPIONATE 1 SPRAY(S): 50 SPRAY, METERED NASAL at 05:38

## 2025-04-13 RX ADMIN — Medication 1 APPLICATION(S): at 05:39

## 2025-04-13 RX ADMIN — Medication 50 MILLIGRAM(S): at 00:14

## 2025-04-13 RX ADMIN — MONTELUKAST SODIUM 10 MILLIGRAM(S): 10 TABLET ORAL at 11:45

## 2025-04-13 RX ADMIN — Medication 25 MILLIGRAM(S): at 21:58

## 2025-04-13 RX ADMIN — Medication 20 MILLILITER(S): at 12:30

## 2025-04-13 RX ADMIN — MIDODRINE HYDROCHLORIDE 20 MILLIGRAM(S): 5 TABLET ORAL at 17:08

## 2025-04-13 RX ADMIN — Medication 10 MILLIGRAM(S): at 11:45

## 2025-04-13 RX ADMIN — DAPTOMYCIN 124 MILLIGRAM(S): 500 INJECTION, POWDER, LYOPHILIZED, FOR SOLUTION INTRAVENOUS at 11:44

## 2025-04-13 RX ADMIN — MIDODRINE HYDROCHLORIDE 20 MILLIGRAM(S): 5 TABLET ORAL at 11:45

## 2025-04-13 RX ADMIN — KETOROLAC TROMETHAMINE 30 MILLIGRAM(S): 30 INJECTION, SOLUTION INTRAMUSCULAR; INTRAVENOUS at 12:30

## 2025-04-13 RX ADMIN — Medication 25 MILLIGRAM(S): at 11:45

## 2025-04-13 RX ADMIN — KETOROLAC TROMETHAMINE 30 MILLIGRAM(S): 30 INJECTION, SOLUTION INTRAMUSCULAR; INTRAVENOUS at 21:57

## 2025-04-13 RX ADMIN — Medication 10 MILLIGRAM(S): at 21:58

## 2025-04-13 RX ADMIN — MIDODRINE HYDROCHLORIDE 20 MILLIGRAM(S): 5 TABLET ORAL at 05:38

## 2025-04-13 RX ADMIN — Medication 40 MILLIGRAM(S): at 06:02

## 2025-04-13 NOTE — PROGRESS NOTE ADULT - SUBJECTIVE AND OBJECTIVE BOX
Patient is a 44y old  Female who presents with a chief complaint of abdom abscess (12 Apr 2025 01:03)      SUBJECTIVE / OVERNIGHT EVENTS:  Patient seen and evaluated at bedside.    Denies any fevers, chills, CP, or SOB.      REVIEW OF SYSTEMS:    CONSTITUTIONAL:  No weakness, fevers or chills  EYES/ENT:  No visual changes;  No vertigo or throat pain   NECK:  No pain or stiffness  RESPIRATORY:  No cough, wheezing, hemoptysis; No shortness of breath  CARDIOVASCULAR:  No chest pain or palpitations  GASTROINTESTINAL:  No abdominal or epigastric pain. No nausea, vomiting, or hematemesis; No diarrhea or constipation. No melena or hematochezia.  GENITOURINARY:  No dysuria, frequency or hematuria  NEUROLOGICAL:  No numbness or weakness  SKIN:  No itching, rashes        MEDICATIONS:  acetaminophen     Tablet .. 1000 milliGRAM(s) Oral every 6 hours PRN Mild Pain (1 - 3), Moderate Pain (4 - 6)  acetaminophen   IVPB .. 1000 milliGRAM(s) IV Intermittent once  albuterol    90 MICROgram(s) HFA Inhaler 2 Puff(s) Inhalation every 6 hours PRN Shortness of Breath  albuterol/ipratropium for Nebulization 3 milliLiter(s) Nebulizer every 6 hours PRN Shortness of Breath  buDESOnide    Inhalation Suspension 0.5 milliGRAM(s) Inhalation daily  chlorhexidine 2% Cloths 1 Application(s) Topical daily  chlorhexidine 2% Cloths 1 Application(s) Topical <User Schedule>  DAPTOmycin IVPB      DAPTOmycin IVPB 600 milliGRAM(s) IV Intermittent every 24 hours  diphenhydrAMINE Injectable 25 milliGRAM(s) IV Push every 8 hours PRN Migraine  fluticasone propionate 50 MICROgram(s)/spray Nasal Spray 1 Spray(s) Both Nostrils two times a day  influenza   Vaccine 0.5 milliLiter(s) IntraMuscular once  ketorolac   Injectable 30 milliGRAM(s) IV Push every 8 hours PRN Migraine  lidocaine   4% Patch 1 Patch Transdermal daily  meclizine 12.5 milliGRAM(s) Oral once PRN Dizziness  melatonin 9 milliGRAM(s) Oral at bedtime PRN Insomnia  metoclopramide 10 milliGRAM(s) Oral every 8 hours PRN Migraine  midodrine. 20 milliGRAM(s) Oral three times a day  montelukast 10 milliGRAM(s) Oral daily  oxyCODONE    IR 2.5 milliGRAM(s) Oral every 6 hours PRN Severe Pain (7 - 10)  pantoprazole    Tablet 40 milliGRAM(s) Oral before breakfast  polyethylene glycol 3350 17 Gram(s) Oral two times a day  remodulin (treprostinil) SubQ infusion 1 Dose(s) 1 Dose(s) SubCutaneous Continuous Pump  senna 2 Tablet(s) Oral at bedtime        Vital Signs Last 24 Hrs  T(C): 36.7 (13 Apr 2025 04:00), Max: 36.8 (12 Apr 2025 16:00)  T(F): 98 (13 Apr 2025 04:00), Max: 98.2 (12 Apr 2025 16:00)  HR: 85 (13 Apr 2025 07:00) (66 - 90)  BP: 100/71 (13 Apr 2025 07:00) (88/53 - 136/75)  BP(mean): 81 (13 Apr 2025 07:00) (64 - 98)  RR: 16 (13 Apr 2025 07:00) (15 - 30)  SpO2: 100% (13 Apr 2025 07:00) (74% - 100%)    Parameters below as of 12 Apr 2025 20:00  Patient On (Oxygen Delivery Method): nasal cannula  O2 Flow (L/min): 2        PHYSICAL EXAM:  GENERAL: NAD, lying in bed comfortably, conversational  HEAD:  Atraumatic, normocephalic  EYES: EOMI, PERRLA, conjunctiva and sclera clear  NECK: Supple, trachea midline, no JVD  HEART: +S1/S2, RRR, no murmurs, rubs, or gallops  LUNGS: Unlabored respirations. CTA b/l, no crackles, wheezing, or rhonchi  ABDOMEN: Soft, NTND, +BS. No masses or hernia palpated  EXTREMITIES: 2+ peripheral pulses bilaterally. No clubbing, cyanosis, or edema  MSK: no gross deformity in extremities, no step off or deformity in C/T/L spine, normal muscle strength/tone  NERVOUS SYSTEM: CN II-XII intact, A&Ox3, moving all extremities spontaneously, no focal deficits, sensation intact and equal in b/l extremities  SKIN: No rashes or lesions      LABS:                        10.8   11.69 )-----------( 339      ( 13 Apr 2025 00:15 )             33.8     Hgb Trend: 10.8<--, 11.1<--, 12.5<--, 12.5<--, 12.5<--  04-13    137  |  102  |  13  ----------------------------<  100[H]  4.0   |  20[L]  |  1.18    Ca    8.4      13 Apr 2025 00:15  Phos  2.9     04-13  Mg     2.1     04-13    TPro  6.4  /  Alb  3.7  /  TBili  0.8  /  DBili  x   /  AST  10  /  ALT  11  /  AlkPhos  63  04-13    Creatinine Trend: 1.18<--, 1.06<--, 1.10<--, 1.09<--, 1.01<--, 0.98<--      LIVER FUNCTIONS - ( 13 Apr 2025 00:15 )  Alb: 3.7 g/dL / Pro: 6.4 g/dL / ALK PHOS: 63 U/L / ALT: 11 U/L / AST: 10 U/L / GGT: x           PT/INR - ( 13 Apr 2025 00:15 )   PT: 14.9 sec;   INR: 1.31 ratio         PTT - ( 13 Apr 2025 00:15 )  PTT:43.7 sec  Urinalysis Basic - ( 13 Apr 2025 00:15 )    Color: x / Appearance: x / SG: x / pH: x  Gluc: 100 mg/dL / Ketone: x  / Bili: x / Urobili: x   Blood: x / Protein: x / Nitrite: x   Leuk Esterase: x / RBC: x / WBC x   Sq Epi: x / Non Sq Epi: x / Bacteria: x     Patient is a 44y old  Female who presents with a chief complaint of abdom abscess (12 Apr 2025 01:03)      SUBJECTIVE / OVERNIGHT EVENTS:  Patient seen and evaluated at bedside. No complaints overnight. Denies any fevers, chills, CP, or SOB.      REVIEW OF SYSTEMS:    CONSTITUTIONAL:  No fevers or chills  RESPIRATORY:  No cough, wheezing; No shortness of breath  CARDIOVASCULAR:  No chest pain or palpitations  GASTROINTESTINAL:  No abdominal pain. No nausea, vomiting; No diarrhea or constipation  GENITOURINARY:  No dysuria, frequency or hematuria      MEDICATIONS:  acetaminophen     Tablet .. 1000 milliGRAM(s) Oral every 6 hours PRN Mild Pain (1 - 3), Moderate Pain (4 - 6)  acetaminophen   IVPB .. 1000 milliGRAM(s) IV Intermittent once  albuterol    90 MICROgram(s) HFA Inhaler 2 Puff(s) Inhalation every 6 hours PRN Shortness of Breath  albuterol/ipratropium for Nebulization 3 milliLiter(s) Nebulizer every 6 hours PRN Shortness of Breath  buDESOnide    Inhalation Suspension 0.5 milliGRAM(s) Inhalation daily  chlorhexidine 2% Cloths 1 Application(s) Topical daily  chlorhexidine 2% Cloths 1 Application(s) Topical <User Schedule>  DAPTOmycin IVPB      DAPTOmycin IVPB 600 milliGRAM(s) IV Intermittent every 24 hours  diphenhydrAMINE Injectable 25 milliGRAM(s) IV Push every 8 hours PRN Migraine  fluticasone propionate 50 MICROgram(s)/spray Nasal Spray 1 Spray(s) Both Nostrils two times a day  influenza   Vaccine 0.5 milliLiter(s) IntraMuscular once  ketorolac   Injectable 30 milliGRAM(s) IV Push every 8 hours PRN Migraine  lidocaine   4% Patch 1 Patch Transdermal daily  meclizine 12.5 milliGRAM(s) Oral once PRN Dizziness  melatonin 9 milliGRAM(s) Oral at bedtime PRN Insomnia  metoclopramide 10 milliGRAM(s) Oral every 8 hours PRN Migraine  midodrine. 20 milliGRAM(s) Oral three times a day  montelukast 10 milliGRAM(s) Oral daily  oxyCODONE    IR 2.5 milliGRAM(s) Oral every 6 hours PRN Severe Pain (7 - 10)  pantoprazole    Tablet 40 milliGRAM(s) Oral before breakfast  polyethylene glycol 3350 17 Gram(s) Oral two times a day  remodulin (treprostinil) SubQ infusion 1 Dose(s) 1 Dose(s) SubCutaneous Continuous Pump  senna 2 Tablet(s) Oral at bedtime        Vital Signs Last 24 Hrs  T(C): 36.7 (13 Apr 2025 04:00), Max: 36.8 (12 Apr 2025 16:00)  T(F): 98 (13 Apr 2025 04:00), Max: 98.2 (12 Apr 2025 16:00)  HR: 85 (13 Apr 2025 07:00) (66 - 90)  BP: 100/71 (13 Apr 2025 07:00) (88/53 - 136/75)  BP(mean): 81 (13 Apr 2025 07:00) (64 - 98)  RR: 16 (13 Apr 2025 07:00) (15 - 30)  SpO2: 100% (13 Apr 2025 07:00) (74% - 100%)    Parameters below as of 12 Apr 2025 20:00  Patient On (Oxygen Delivery Method): nasal cannula  O2 Flow (L/min): 2        PHYSICAL EXAM:  GENERAL: NAD, lying in bed comfortably, conversational  HEAD:  Atraumatic, normocephalic  EYES: EOMI, PERRLA, conjunctiva and sclera clear  HEART: +S1/S2, RRR, no murmurs, rubs, or gallops  LUNGS: Unlabored respirations. CTA b/l  ABDOMEN: Soft, NTND, +BS  EXTREMITIES: 2+ peripheral pulses bilaterally  MSK: no gross deformity in extremities, normal muscle strength/tone  NERVOUS SYSTEM: A&Ox3, moving all extremities spontaneously      LABS:                        10.8   11.69 )-----------( 339      ( 13 Apr 2025 00:15 )             33.8     Hgb Trend: 10.8<--, 11.1<--, 12.5<--, 12.5<--, 12.5<--  04-13    137  |  102  |  13  ----------------------------<  100[H]  4.0   |  20[L]  |  1.18    Ca    8.4      13 Apr 2025 00:15  Phos  2.9     04-13  Mg     2.1     04-13    TPro  6.4  /  Alb  3.7  /  TBili  0.8  /  DBili  x   /  AST  10  /  ALT  11  /  AlkPhos  63  04-13    Creatinine Trend: 1.18<--, 1.06<--, 1.10<--, 1.09<--, 1.01<--, 0.98<--      LIVER FUNCTIONS - ( 13 Apr 2025 00:15 )  Alb: 3.7 g/dL / Pro: 6.4 g/dL / ALK PHOS: 63 U/L / ALT: 11 U/L / AST: 10 U/L / GGT: x           PT/INR - ( 13 Apr 2025 00:15 )   PT: 14.9 sec;   INR: 1.31 ratio         PTT - ( 13 Apr 2025 00:15 )  PTT:43.7 sec  Urinalysis Basic - ( 13 Apr 2025 00:15 )    Color: x / Appearance: x / SG: x / pH: x  Gluc: 100 mg/dL / Ketone: x  / Bili: x / Urobili: x   Blood: x / Protein: x / Nitrite: x   Leuk Esterase: x / RBC: x / WBC x   Sq Epi: x / Non Sq Epi: x / Bacteria: x

## 2025-04-13 NOTE — PROGRESS NOTE ADULT - ASSESSMENT
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 Patient p/w right sided abdominal pain x2 weeks and recurrent c/f abscess, was started on doxycycline outpatient however pain and swelling have worsened. Surgery performed an InD on 4/3. New site has some induration palpable. Previous CT on 4/2 noted some small collection in that area but no recent imaging to define collections.     Recommendations:   - Will perform bedside aspiration today  - Plan to send culture, f/u culture results   - If patient becomes symptomatic from the collection, please reconsult at that time  - Please change packing in previous site daily for wound care.   - Discussed w/ patient and primary team     ACS/Trauma  [id number]

## 2025-04-13 NOTE — PROGRESS NOTE ADULT - ASSESSMENT
Assessment and Plan:    Neurologic:  -AAOx3    #migraines  -Headache cocktail per neuro: "-First line: recommend migraine medications (to be given all together at the same time if no contraindications form comorbitities): ketorolac (Toradol) 30mg IV q8h PRN (or acetaminophen 1g IV q8h PRN), metoclopramide (Reglan) 10mg q8h PRN, diphenhydramine (Benadryl) 25mg IV q8h PRN. Please repeat at least 2-3 cycles for medications to be effective."  -Currently managed with oxycodone PRN    #peripheral vertigo  -meclizine 12.5 mg PRN     Cardiovascular:  #Hypotension:  -Hold bumetanide, spironolactone, sildenafil, and nifedipine.  -midodrine 20 mg TID.  -Heart failure signing off as repeat TTE similar to prior     #Right Heart Failure:  -Hold bumetanide, spironolactone, and nifedipine.  -Heart failure signing off as repeat TTE similar to prior       #Pulmonary Hypertension:  -Continue home Remodulin subQ 51 ng/kg/min.  -midline in place by IR on 4/11  -Check BNP  -TTE similar to prior   -Pulmonology following    #Chronic PE  - Xarelto dc'ed in anticipation potential I&D    Pulmonary:  #Pulmonary Hypertension:  -Continue home Remodulin subQ 51 ng/kg/min.  -midline in place by IR on 4/11  -Check BNP  -TTE similar to prior   -Pulmonology following    #Asthma  Continue home ipratropium (Atrovent) nebulizer PRN.  Continue home budesonide/formoterol (Pulmicort), tiotropium (Spiriva), and PRN nebulizers.  Continue fluticasone (Flonase).  Maintain supplemental O2 2L NC, keep SpO2 > 92%, avoid hyperoxia.    #Chronic PE:  - Xarelto dc'ed in anticipation potential I&D    #Lung Transplant Evaluation  Ongoing pre-lung transplant workup.  Transplant Pulm requesting LLQ abscess I&D for medical optimization for lung transplant candidacy   Seen by pulmonary transplant service.  Patient is being considered for inpatient transplant workup per Dr. Wise.  Needs cardiac MRI (requires transition to MRI-compatible pump—pulmonary transplant team working on this).  cardiac MRI on 4/14 @ 10:00 am     /Renal:  -no active issues  -replete electrolytes as needed     GI:  #Abdominal Abscess  linezolid 600 mg PO q12h for 10-14 days until 4/16 was dc'ed by ID and switched to daptomycin 6 mg/kg until 4/19  Per general surgery, no acute surgical intervention at this time for abscess.   Transplant Pulm requesting LLQ abscess I&D for medical optimization for lung transplant candidacy   f/u with surgery regarding potential I&D    #constipation  -Continue Miralax BID and senna    Hematology/Onc:  #Iron Deficiency Anemia (JULIA)  -Address once acute issues are stabilized. (No specific plan in the original note).    ID:  #Abdominal Abscess  linezolid 600 mg PO q12h for 10-14 days until 4/16 was dc'ed by ID and switched to daptomycin 6 mg/kg until 4/19  f/u ID recommendations.   Assessment and Plan:    Neurologic:  -AAOx3    #migraines  -Headache cocktail per neuro: "-First line: recommend migraine medications (to be given all together at the same time if no contraindications form comorbitities): ketorolac (Toradol) 30mg IV q8h PRN (or acetaminophen 1g IV q8h PRN), metoclopramide (Reglan) 10mg q8h PRN, diphenhydramine (Benadryl) 25mg IV q8h PRN. Please repeat at least 2-3 cycles for medications to be effective."  -Currently managed with oxycodone PRN    #peripheral vertigo  -meclizine 12.5 mg PRN     Cardiovascular:  #Hypotension:  -Hold bumetanide, spironolactone, sildenafil, and nifedipine.  -midodrine 20 mg TID.  -Heart failure signing off as repeat TTE similar to prior     #Right Heart Failure:  -Hold bumetanide, spironolactone, and nifedipine.  -Heart failure signing off as repeat TTE similar to prior       #Pulmonary Hypertension:  -Continue home Remodulin subQ 51 ng/kg/min.  -midline in place by IR on 4/11  -Check BNP  -TTE similar to prior   -Pulmonology following    #Chronic PE  - Xarelto dc'ed in anticipation potential I&D    Pulmonary:  #Pulmonary Hypertension:  -Continue home Remodulin subQ 51 ng/kg/min.  -midline in place by IR on 4/11  -Check BNP  -TTE similar to prior   -Pulmonology following    #Asthma  Continue home ipratropium (Atrovent) nebulizer PRN.  Continue home budesonide/formoterol (Pulmicort), tiotropium (Spiriva), and PRN nebulizers.  Continue fluticasone (Flonase).  Maintain supplemental O2 2L NC, keep SpO2 > 92%, avoid hyperoxia.    #Chronic PE:  - Xarelto dc'ed in anticipation potential I&D    #Lung Transplant Evaluation  Ongoing pre-lung transplant workup.  Transplant Pulm requesting LLQ abscess I&D for medical optimization for lung transplant candidacy   Seen by pulmonary transplant service.  Patient is being considered for inpatient transplant workup per Dr. Wise.  Needs cardiac MRI (requires transition to MRI-compatible pump—pulmonary transplant team working on this).  cardiac MRI on 4/14 @ 01:00 PM     /Renal:  -no active issues  -replete electrolytes as needed     GI:  #Abdominal Abscess  linezolid 600 mg PO q12h for 10-14 days until 4/16 was dc'ed by ID and switched to daptomycin 6 mg/kg until 4/19  Per general surgery, no acute surgical intervention at this time for abscess.   Transplant Pulm requesting LLQ abscess I&D for medical optimization for lung transplant candidacy   f/u with surgery regarding potential I&D    #constipation  -Continue Miralax BID and senna    Hematology/Onc:  #Iron Deficiency Anemia (JULIA)  -Address once acute issues are stabilized. (No specific plan in the original note).    ID:  #Abdominal Abscess  linezolid 600 mg PO q12h for 10-14 days until 4/16 was dc'ed by ID and switched to daptomycin 6 mg/kg until 4/19  f/u ID recommendations.

## 2025-04-13 NOTE — PROGRESS NOTE ADULT - SUBJECTIVE AND OBJECTIVE BOX
Morning Surgical Progress Note  Patient is a 44y old  Female who presents with a chief complaint of Abdominal abscess (13 Apr 2025 07:19)    SUBJECTIVE: Patient seen and examined at bedside with surgical team. Patient denies tenderness at site and states that she feels fine.     Vital Signs Last 24 Hrs  T(C): 36.6 (13 Apr 2025 12:00), Max: 36.8 (12 Apr 2025 16:00)  T(F): 97.9 (13 Apr 2025 12:00), Max: 98.2 (12 Apr 2025 16:00)  HR: 75 (13 Apr 2025 14:00) (67 - 90)  BP: 108/57 (13 Apr 2025 14:00) (88/53 - 114/67)  BP(mean): 77 (13 Apr 2025 14:00) (64 - 83)  RR: 16 (13 Apr 2025 14:00) (14 - 30)  SpO2: 99% (13 Apr 2025 14:00) (74% - 100%)    Parameters below as of 13 Apr 2025 08:00  Patient On (Oxygen Delivery Method): nasal cannula  O2 Flow (L/min): 2  I&O's Detail    12 Apr 2025 07:01  -  13 Apr 2025 07:00  --------------------------------------------------------  IN:    IV PiggyBack: 50 mL    Oral Fluid: 350 mL  Total IN: 400 mL    OUT:    Voided (mL): 250 mL  Total OUT: 250 mL    Total NET: 150 mL      13 Apr 2025 07:01  -  13 Apr 2025 15:08  --------------------------------------------------------  IN:    IV PiggyBack: 50 mL    Oral Fluid: 200 mL  Total IN: 250 mL    OUT:  Total OUT: 0 mL    Total NET: 250 mL        Medications  MEDICATIONS  (STANDING):  acetaminophen   IVPB .. 1000 milliGRAM(s) IV Intermittent once  buDESOnide    Inhalation Suspension 0.5 milliGRAM(s) Inhalation daily  chlorhexidine 2% Cloths 1 Application(s) Topical daily  chlorhexidine 2% Cloths 1 Application(s) Topical <User Schedule>  DAPTOmycin IVPB      DAPTOmycin IVPB 600 milliGRAM(s) IV Intermittent every 24 hours  fluticasone propionate 50 MICROgram(s)/spray Nasal Spray 1 Spray(s) Both Nostrils two times a day  influenza   Vaccine 0.5 milliLiter(s) IntraMuscular once  lidocaine 1% Injectable 20 milliLiter(s) Local Injection once  midodrine. 20 milliGRAM(s) Oral three times a day  montelukast 10 milliGRAM(s) Oral daily  pantoprazole    Tablet 40 milliGRAM(s) Oral before breakfast  polyethylene glycol 3350 17 Gram(s) Oral two times a day  remodulin (treprostinil) SubQ infusion 1 Dose(s) 1 Dose(s) SubCutaneous Continuous Pump  senna 2 Tablet(s) Oral at bedtime    MEDICATIONS  (PRN):  acetaminophen     Tablet .. 1000 milliGRAM(s) Oral every 6 hours PRN Mild Pain (1 - 3), Moderate Pain (4 - 6)  albuterol    90 MICROgram(s) HFA Inhaler 2 Puff(s) Inhalation every 6 hours PRN Shortness of Breath  albuterol/ipratropium for Nebulization 3 milliLiter(s) Nebulizer every 6 hours PRN Shortness of Breath  diphenhydrAMINE Injectable 25 milliGRAM(s) IV Push every 8 hours PRN Migraine  ketorolac   Injectable 30 milliGRAM(s) IV Push every 8 hours PRN Migraine  meclizine 12.5 milliGRAM(s) Oral once PRN Dizziness  melatonin 9 milliGRAM(s) Oral at bedtime PRN Insomnia  metoclopramide 10 milliGRAM(s) Oral every 8 hours PRN Migraine  oxyCODONE    IR 2.5 milliGRAM(s) Oral every 6 hours PRN Severe Pain (7 - 10)    Physical Exam  Physical Exam:  Gen: Laying in bed, NAD  Resp: Unlabored breathing  Abd: soft, non-distended, non-tender, ~1cm soft mass at LLQ, nontender to touch, no skin opening, no drainage  Ext: Moves 4 extremities spontaneously    LABS:                        10.8   11.69 )-----------( 339      ( 13 Apr 2025 00:15 )             33.8     04-13    137  |  102  |  13  ----------------------------<  100[H]  4.0   |  20[L]  |  1.18    Ca    8.4      13 Apr 2025 00:15  Phos  2.9     04-13  Mg     2.1     04-13    TPro  6.4  /  Alb  3.7  /  TBili  0.8  /  DBili  x   /  AST  10  /  ALT  11  /  AlkPhos  63  04-13    PT/INR - ( 13 Apr 2025 00:15 )   PT: 14.9 sec;   INR: 1.31 ratio         PTT - ( 13 Apr 2025 00:15 )  PTT:43.7 sec  LIVER FUNCTIONS - ( 13 Apr 2025 00:15 )  Alb: 3.7 g/dL / Pro: 6.4 g/dL / ALK PHOS: 63 U/L / ALT: 11 U/L / AST: 10 U/L / GGT: x           Urinalysis Basic - ( 13 Apr 2025 00:15 )    Color: x / Appearance: x / SG: x / pH: x  Gluc: 100 mg/dL / Ketone: x  / Bili: x / Urobili: x   Blood: x / Protein: x / Nitrite: x   Leuk Esterase: x / RBC: x / WBC x   Sq Epi: x / Non Sq Epi: x / Bacteria: x

## 2025-04-13 NOTE — PROGRESS NOTE ADULT - ATTENDING COMMENTS
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 was admitted after being sent in from outpatient pulmonologist, for concern of worsening abdominal abscess in proximity to medication pump that has been unresponsive to doxycyline.    Had I&D of the right sided abscess. Surgery does not think the LLQ collection is an abscess and does not recommend drainage. At this time, while source control would be ideal given transplant listing pending, we will continue medical management with antibiotics. Transplant ID is following. In addition, she needs a cMRI and Abdominal MRI to for transplant work up. She does not have central access for intravenous pulmonary vasodilators, and with possible active infection, is not cleared to get one. Her remodulin pump is not MRI compatible. After extensive discussion with pHTN, transplant teams, we will obtain MRI one at a time, off the pump given half life of remodulin is 4 hours and monitor her to ensure she does not get hypotensive. Continue midodrine. Will continue to optimize pHTN. In addition, as per discussion with pHTN team, the indication of AC is soft as it was for a previous UE DVT. Given she may require procedures, they recommended holding ac for now. Discussed with patient at length. Overall prognosis is guarded.

## 2025-04-13 NOTE — PROCEDURE NOTE - ADDITIONAL PROCEDURE DETAILS
After lidocaine injection, site was spontaneously draining. Ultrasound was used and 7cc of fluid were aspirated from site and sent for culture.

## 2025-04-13 NOTE — PROCEDURE NOTE - NSINFORMCONSENT_GEN_A_CORE
Benefits, risks, and possible complications of procedure explained to patient/caregiver who verbalized understanding and gave written consent.
Benefits, risks, and possible complications of procedure explained to patient/caregiver who verbalized understanding and gave written consent.
In Chart/Benefits, risks, and possible complications of procedure explained to patient/caregiver who verbalized understanding and gave written consent.

## 2025-04-14 PROCEDURE — 75561 CARDIAC MRI FOR MORPH W/DYE: CPT | Mod: 26

## 2025-04-14 PROCEDURE — 99233 SBSQ HOSP IP/OBS HIGH 50: CPT | Mod: GC

## 2025-04-14 PROCEDURE — 93286 PERI-PX EVAL PM/LDLS PM IP: CPT | Mod: 26,76

## 2025-04-14 RX ORDER — LORAZEPAM 4 MG/ML
0.5 VIAL (ML) INJECTION ONCE
Refills: 0 | Status: DISCONTINUED | OUTPATIENT
Start: 2025-04-14 | End: 2025-04-14

## 2025-04-14 RX ORDER — RIVAROXABAN 10 MG/1
15 TABLET, FILM COATED ORAL
Refills: 0 | Status: DISCONTINUED | OUTPATIENT
Start: 2025-04-14 | End: 2025-04-24

## 2025-04-14 RX ORDER — LORAZEPAM 4 MG/ML
2 VIAL (ML) INJECTION ONCE
Refills: 0 | Status: DISCONTINUED | OUTPATIENT
Start: 2025-04-14 | End: 2025-04-14

## 2025-04-14 RX ORDER — LORAZEPAM 4 MG/ML
1 VIAL (ML) INJECTION ONCE
Refills: 0 | Status: DISCONTINUED | OUTPATIENT
Start: 2025-04-14 | End: 2025-04-14

## 2025-04-14 RX ADMIN — KETOROLAC TROMETHAMINE 30 MILLIGRAM(S): 30 INJECTION, SOLUTION INTRAMUSCULAR; INTRAVENOUS at 18:39

## 2025-04-14 RX ADMIN — Medication 10 MILLIGRAM(S): at 18:07

## 2025-04-14 RX ADMIN — RIVAROXABAN 15 MILLIGRAM(S): 10 TABLET, FILM COATED ORAL at 18:07

## 2025-04-14 RX ADMIN — MIDODRINE HYDROCHLORIDE 20 MILLIGRAM(S): 5 TABLET ORAL at 11:36

## 2025-04-14 RX ADMIN — Medication 1 APPLICATION(S): at 05:20

## 2025-04-14 RX ADMIN — Medication 1 MILLIGRAM(S): at 16:14

## 2025-04-14 RX ADMIN — Medication 0.5 MILLIGRAM(S): at 16:14

## 2025-04-14 RX ADMIN — KETOROLAC TROMETHAMINE 30 MILLIGRAM(S): 30 INJECTION, SOLUTION INTRAMUSCULAR; INTRAVENOUS at 23:30

## 2025-04-14 RX ADMIN — Medication 40 MILLIGRAM(S): at 05:19

## 2025-04-14 RX ADMIN — Medication 10 MILLIGRAM(S): at 08:15

## 2025-04-14 RX ADMIN — MONTELUKAST SODIUM 10 MILLIGRAM(S): 10 TABLET ORAL at 11:36

## 2025-04-14 RX ADMIN — FLUTICASONE PROPIONATE 1 SPRAY(S): 50 SPRAY, METERED NASAL at 05:20

## 2025-04-14 RX ADMIN — KETOROLAC TROMETHAMINE 30 MILLIGRAM(S): 30 INJECTION, SOLUTION INTRAMUSCULAR; INTRAVENOUS at 18:09

## 2025-04-14 RX ADMIN — KETOROLAC TROMETHAMINE 30 MILLIGRAM(S): 30 INJECTION, SOLUTION INTRAMUSCULAR; INTRAVENOUS at 08:15

## 2025-04-14 RX ADMIN — FLUTICASONE PROPIONATE 1 SPRAY(S): 50 SPRAY, METERED NASAL at 18:09

## 2025-04-14 RX ADMIN — Medication 25 MILLIGRAM(S): at 08:13

## 2025-04-14 RX ADMIN — MIDODRINE HYDROCHLORIDE 20 MILLIGRAM(S): 5 TABLET ORAL at 18:06

## 2025-04-14 RX ADMIN — Medication 25 MILLIGRAM(S): at 18:07

## 2025-04-14 RX ADMIN — Medication 25 MILLIGRAM(S): at 23:30

## 2025-04-14 RX ADMIN — DAPTOMYCIN 124 MILLIGRAM(S): 500 INJECTION, POWDER, LYOPHILIZED, FOR SOLUTION INTRAVENOUS at 13:07

## 2025-04-14 RX ADMIN — HEPARIN SODIUM 5000 UNIT(S): 1000 INJECTION INTRAVENOUS; SUBCUTANEOUS at 05:19

## 2025-04-14 RX ADMIN — MIDODRINE HYDROCHLORIDE 20 MILLIGRAM(S): 5 TABLET ORAL at 05:19

## 2025-04-14 RX ADMIN — KETOROLAC TROMETHAMINE 30 MILLIGRAM(S): 30 INJECTION, SOLUTION INTRAMUSCULAR; INTRAVENOUS at 08:30

## 2025-04-14 RX ADMIN — Medication 9 MILLIGRAM(S): at 21:40

## 2025-04-14 NOTE — PROCEDURE NOTE - ADDITIONAL PROCEDURE DETAILS
Post MRI  1) Presenting rhythm: SR in the 60's  2) Measured data WNL, NL PM function, Not PM dependent.  3) Since 12/14/2022: AP <1%,  <1%, AT/AF burden <1%  4) Changes made: MRI Protection mode turned off and pacing mode changed back to DDD (AAI with VVI backup) 60-130bpm    AVRIL Corey, NP-C  [id number]

## 2025-04-14 NOTE — PROGRESS NOTE ADULT - ASSESSMENT
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 This hospitalization she has had hypotension to low 80s (starting midodrine 4/9)  Neurology consulted 4/9/25 for headaches and vertigo.  -Headaches similar to prior but more frequent and more severe. +throbbing +photophobia . Responds to oxycodone / hydromorphone but not acetaminophen  -Vertigo is positional and paroxysmal. Responded well to meclizine 12.5mg x 1  -Neuro exam with left corrective saccade on right head impulse, only end gaze nystagmus, and no skew   She has chronic headaches for last 2 years. Headaches were holocephalic, perhaps worse posteriorly, throbb  CTH (4/7/25) unrevealing  s/p LLQ collection aspiration     Impression:  1) Migraine without aura, worse in setting of acute medical illness; imporved   2) Peripheral vertigo; better    Recommendations  #Migraine without aura  Baseline 1 episode per week, now daily and more intense while hospitalized responsive to opioids here, less so to acetaminophen   -Consider non-opioid treatment of head pain  -First line: recommend migraine medications (to be given all together at the same time if no contraindications form comorbitities): ketorolac (Toradol) 30mg IV q8h PRN (or acetaminophen 1g IV q8h PRN), metoclopramide (Reglan) 10mg q8h PRN, diphenhydramine (Benadryl) 25mg IV q8h PRN. Please repeat at least 2-3 cycles for medications to be effective.  -IV hydration, Mg 2g IV x1    #Peripheral Vertigo  -Physical therapy  -Vestibular Rehab Referral  -ENT Referral  -[email or web address] for patient education  -Check orthostatic vitals   -IV Fluids as safe/able (after orthostatic vitals) consider bolus and then maintenance fluids if no contraindications  -Meclizine PRN, she responded well to 1x dose of 12.5mg    -remaining per primary team/ICU   Triston Alexandre MD  Vascular Neurology  Office: [phone] .

## 2025-04-14 NOTE — PROGRESS NOTE ADULT - ASSESSMENT
************************  Amirah Rowe,    Emergency Medicine PGY-1  ************************    Patient is a 44y old  Female who presents with a chief complaint of Abdominal abscess (13 Apr 2025 07:19)    Overnight heparin subQ restarted s/p abscess aspiration on 4/13 in the afternoon. 7cc of fluid drained from abscess.     MEDICATIONS  (STANDING):  buDESOnide    Inhalation Suspension 0.5 milliGRAM(s) Inhalation daily  chlorhexidine 2% Cloths 1 Application(s) Topical <User Schedule>  DAPTOmycin IVPB      DAPTOmycin IVPB 600 milliGRAM(s) IV Intermittent every 24 hours  fluticasone propionate 50 MICROgram(s)/spray Nasal Spray 1 Spray(s) Both Nostrils two times a day  heparin   Injectable 5000 Unit(s) SubCutaneous every 8 hours  influenza   Vaccine 0.5 milliLiter(s) IntraMuscular once  midodrine. 20 milliGRAM(s) Oral three times a day  montelukast 10 milliGRAM(s) Oral daily  pantoprazole    Tablet 40 milliGRAM(s) Oral before breakfast  polyethylene glycol 3350 17 Gram(s) Oral two times a day  remodulin (treprostinil) SubQ infusion 1 Dose(s) 1 Dose(s) SubCutaneous Continuous Pump  senna 2 Tablet(s) Oral at bedtime    MEDICATIONS  (PRN):  acetaminophen     Tablet .. 1000 milliGRAM(s) Oral every 6 hours PRN Mild Pain (1 - 3), Moderate Pain (4 - 6)  albuterol/ipratropium for Nebulization 3 milliLiter(s) Nebulizer every 6 hours PRN Shortness of Breath  diphenhydrAMINE Injectable 25 milliGRAM(s) IV Push every 8 hours PRN Migraine  ketorolac   Injectable 30 milliGRAM(s) IV Push every 8 hours PRN Migraine  meclizine 12.5 milliGRAM(s) Oral once PRN Dizziness  melatonin 9 milliGRAM(s) Oral at bedtime PRN Insomnia  metoclopramide 10 milliGRAM(s) Oral every 8 hours PRN Migraine  oxyCODONE    IR 2.5 milliGRAM(s) Oral every 6 hours PRN Severe Pain (7 - 10)      CAPILLARY BLOOD GLUCOSE        I&O's Summary    13 Apr 2025 07:01  -  14 Apr 2025 07:00  --------------------------------------------------------  IN: 650 mL / OUT: 400 mL / NET: 250 mL        PHYSICAL EXAM:  Vital Signs Last 24 Hrs  T(C): 36.7 (14 Apr 2025 04:00), Max: 36.9 (13 Apr 2025 16:00)  T(F): 98 (14 Apr 2025 04:00), Max: 98.4 (13 Apr 2025 16:00)  HR: 83 (14 Apr 2025 07:00) (66 - 91)  BP: 107/62 (14 Apr 2025 07:00) (81/46 - 120/57)  BP(mean): 81 (14 Apr 2025 07:00) (65 - 82)  RR: 17 (14 Apr 2025 07:00) (11 - 45)  SpO2: 100% (14 Apr 2025 07:00) (92% - 100%)    Parameters below as of 13 Apr 2025 20:00  Patient On (Oxygen Delivery Method): room air        PHYSICAL EXAM:  GENERAL: NAD, lying in bed comfortably  HEENT: NC/AT  NECK: Supple, No JVD  CHEST/LUNG: CTAB, no increased WOB  HEART: RRR, no m/r/g  ABDOMEN: soft, NT, ND, BS+  EXTREMITIES:  2+ peripheral pulses, no edema  NERVOUS SYSTEM:  A&Ox3  MSK: FROM all 4 extremities, full and equal strength  SKIN: No rashes or lesions    LABS:                        11.0   9.61  )-----------( 337      ( 13 Apr 2025 22:36 )             34.3     04-13    137  |  103  |  11  ----------------------------<  84  4.3   |  22  |  1.06    Ca    8.5      13 Apr 2025 22:36  Phos  3.0     04-13  Mg     2.1     04-13    TPro  6.8  /  Alb  3.8  /  TBili  0.8  /  DBili  x   /  AST  11  /  ALT  7[L]  /  AlkPhos  62  04-13    PT/INR - ( 13 Apr 2025 22:36 )   PT: 13.7 sec;   INR: 1.19 ratio         PTT - ( 13 Apr 2025 22:36 )  PTT:39.6 sec      Urinalysis Basic - ( 13 Apr 2025 22:36 )    Color: x / Appearance: x / SG: x / pH: x  Gluc: 84 mg/dL / Ketone: x  / Bili: x / Urobili: x   Blood: x / Protein: x / Nitrite: x   Leuk Esterase: x / RBC: x / WBC x   Sq Epi: x / Non Sq Epi: x / Bacteria: x        Culture - Blood (collected 11 Apr 2025 16:30)  Source: Blood Blood  Preliminary Report (13 Apr 2025 22:01):    No growth at 48 Hours    Culture - Blood (collected 11 Apr 2025 16:25)  Source: Blood Blood  Preliminary Report (13 Apr 2025 22:01):    No growth at 48 Hours    Assessment and Plan:    Neurologic:  -AAOx3    #migraines  -Headache cocktail per neuro: "-First line: recommend migraine medications (to be given all together at the same time if no contraindications form comorbitities): ketorolac (Toradol) 30mg IV q8h PRN (or acetaminophen 1g IV q8h PRN), metoclopramide (Reglan) 10mg q8h PRN, diphenhydramine (Benadryl) 25mg IV q8h PRN. Please repeat at least 2-3 cycles for medications to be effective."  -Currently managed with oxycodone PRN    #peripheral vertigo  -meclizine 12.5 mg PRN     Cardiovascular:  #Hypotension:  -Hold bumetanide, spironolactone, sildenafil, and nifedipine.  -midodrine 20 mg TID.  -Heart failure signing off as repeat TTE similar to prior     #Right Heart Failure:  -Hold bumetanide, spironolactone, and nifedipine.  -Heart failure signing off as repeat TTE similar to prior       #Pulmonary Hypertension:  -Continue home Remodulin subQ 51 ng/kg/min.  -midline in place by IR on 4/11  -Check BNP  -TTE similar to prior   -Pulmonology following    #Chronic PE  - Xarelto dc'ed in anticipation potential I&D  - Xarelto restarted on 4/14 post surgical aspiration of LLQ collection    Pulmonary:  #Pulmonary Hypertension:  -Continue home Remodulin subQ 51 ng/kg/min.  -midline in place by IR on 4/11  -Check BNP  -TTE similar to prior   -Pulmonology following    #Asthma  Continue home ipratropium (Atrovent) nebulizer PRN.  Continue home budesonide/formoterol (Pulmicort), tiotropium (Spiriva), and PRN nebulizers.  Continue fluticasone (Flonase).  Maintain supplemental O2 2L NC, keep SpO2 > 92%, avoid hyperoxia.    #Chronic PE:  - Xarelto dc'ed in anticipation potential I&D  - Xarelto restarted on 4/14 post surgical aspiration of LLQ collection      #Lung Transplant Evaluation  Ongoing pre-lung transplant workup.  Transplant Pulm requesting LLQ abscess I&D for medical optimization for lung transplant candidacy   Seen by pulmonary transplant service.  Patient is being considered for inpatient transplant workup per Dr. Wise.  Needs cardiac MRI (requires transition to MRI-compatible pump—pulmonary transplant team working on this).  cardiac MRI on 4/14 @ 01:00 PM     /Renal:  -no active issues  -replete electrolytes as needed     GI:  #Abdominal Abscess  -linezolid 600 mg PO q12h for 10-14 days until 4/16 was dc'ed by ID and switched to daptomycin 6 mg/kg until 4/19  -Per general surgery, no acute surgical intervention at this time for abscess.   -Transplant Pulm requesting LLQ abscess I&D for medical optimization for lung transplant candidacy   -s/p LLQ collection aspiration on 4/13    #constipation  -Continue Miralax BID and senna    Hematology/Onc:  #Iron Deficiency Anemia (JULIA)  -Address once acute issues are stabilized. (No specific plan in the original note).    ID:  #Abdominal Abscess  linezolid 600 mg PO q12h for 10-14 days until 4/16 was dc'ed by ID and switched to daptomycin 6 mg/kg until 4/19  f/u ID recommendations.       ************************  Amirah Rowe,    Emergency Medicine PGY-1  ************************    Patient is a 44y old  Female who presents with a chief complaint of Abdominal abscess (13 Apr 2025 07:19)    Overnight heparin subQ restarted s/p abscess aspiration on 4/13 in the afternoon. 7cc of fluid drained from abscess.     MEDICATIONS  (STANDING):  buDESOnide    Inhalation Suspension 0.5 milliGRAM(s) Inhalation daily  chlorhexidine 2% Cloths 1 Application(s) Topical <User Schedule>  DAPTOmycin IVPB      DAPTOmycin IVPB 600 milliGRAM(s) IV Intermittent every 24 hours  fluticasone propionate 50 MICROgram(s)/spray Nasal Spray 1 Spray(s) Both Nostrils two times a day  heparin   Injectable 5000 Unit(s) SubCutaneous every 8 hours  influenza   Vaccine 0.5 milliLiter(s) IntraMuscular once  midodrine. 20 milliGRAM(s) Oral three times a day  montelukast 10 milliGRAM(s) Oral daily  pantoprazole    Tablet 40 milliGRAM(s) Oral before breakfast  polyethylene glycol 3350 17 Gram(s) Oral two times a day  remodulin (treprostinil) SubQ infusion 1 Dose(s) 1 Dose(s) SubCutaneous Continuous Pump  senna 2 Tablet(s) Oral at bedtime    MEDICATIONS  (PRN):  acetaminophen     Tablet .. 1000 milliGRAM(s) Oral every 6 hours PRN Mild Pain (1 - 3), Moderate Pain (4 - 6)  albuterol/ipratropium for Nebulization 3 milliLiter(s) Nebulizer every 6 hours PRN Shortness of Breath  diphenhydrAMINE Injectable 25 milliGRAM(s) IV Push every 8 hours PRN Migraine  ketorolac   Injectable 30 milliGRAM(s) IV Push every 8 hours PRN Migraine  meclizine 12.5 milliGRAM(s) Oral once PRN Dizziness  melatonin 9 milliGRAM(s) Oral at bedtime PRN Insomnia  metoclopramide 10 milliGRAM(s) Oral every 8 hours PRN Migraine  oxyCODONE    IR 2.5 milliGRAM(s) Oral every 6 hours PRN Severe Pain (7 - 10)      CAPILLARY BLOOD GLUCOSE        I&O's Summary    13 Apr 2025 07:01  -  14 Apr 2025 07:00  --------------------------------------------------------  IN: 650 mL / OUT: 400 mL / NET: 250 mL        PHYSICAL EXAM:  Vital Signs Last 24 Hrs  T(C): 36.7 (14 Apr 2025 04:00), Max: 36.9 (13 Apr 2025 16:00)  T(F): 98 (14 Apr 2025 04:00), Max: 98.4 (13 Apr 2025 16:00)  HR: 83 (14 Apr 2025 07:00) (66 - 91)  BP: 107/62 (14 Apr 2025 07:00) (81/46 - 120/57)  BP(mean): 81 (14 Apr 2025 07:00) (65 - 82)  RR: 17 (14 Apr 2025 07:00) (11 - 45)  SpO2: 100% (14 Apr 2025 07:00) (92% - 100%)    Parameters below as of 13 Apr 2025 20:00  Patient On (Oxygen Delivery Method): room air        PHYSICAL EXAM:  GENERAL: NAD, lying in bed comfortably  HEENT: NC/AT  CHEST/LUNG: CTAB, no increased WOB  HEART: RRR, no m/r/g  ABDOMEN: soft, NT, ND, BS+  NERVOUS SYSTEM:  A&Ox3    LABS:                        11.0   9.61  )-----------( 337      ( 13 Apr 2025 22:36 )             34.3     04-13    137  |  103  |  11  ----------------------------<  84  4.3   |  22  |  1.06    Ca    8.5      13 Apr 2025 22:36  Phos  3.0     04-13  Mg     2.1     04-13    TPro  6.8  /  Alb  3.8  /  TBili  0.8  /  DBili  x   /  AST  11  /  ALT  7[L]  /  AlkPhos  62  04-13    PT/INR - ( 13 Apr 2025 22:36 )   PT: 13.7 sec;   INR: 1.19 ratio         PTT - ( 13 Apr 2025 22:36 )  PTT:39.6 sec      Urinalysis Basic - ( 13 Apr 2025 22:36 )    Color: x / Appearance: x / SG: x / pH: x  Gluc: 84 mg/dL / Ketone: x  / Bili: x / Urobili: x   Blood: x / Protein: x / Nitrite: x   Leuk Esterase: x / RBC: x / WBC x   Sq Epi: x / Non Sq Epi: x / Bacteria: x        Culture - Blood (collected 11 Apr 2025 16:30)  Source: Blood Blood  Preliminary Report (13 Apr 2025 22:01):    No growth at 48 Hours    Culture - Blood (collected 11 Apr 2025 16:25)  Source: Blood Blood  Preliminary Report (13 Apr 2025 22:01):    No growth at 48 Hours    Assessment and Plan:    Neurologic:  -AAOx3    #migraines  -Headache cocktail per neuro: "-First line: recommend migraine medications (to be given all together at the same time if no contraindications form comorbitities): ketorolac (Toradol) 30mg IV q8h PRN (or acetaminophen 1g IV q8h PRN), metoclopramide (Reglan) 10mg q8h PRN, diphenhydramine (Benadryl) 25mg IV q8h PRN. Please repeat at least 2-3 cycles for medications to be effective."  -Currently managed with oxycodone PRN    #peripheral vertigo  -meclizine 12.5 mg PRN     Cardiovascular:  #Hypotension:  -Hold bumetanide, spironolactone, sildenafil, and nifedipine.  -midodrine 20 mg TID.  -Heart failure signing off as repeat TTE similar to prior     #Right Heart Failure:  -Hold bumetanide, spironolactone, and nifedipine.  -Heart failure signing off as repeat TTE similar to prior       #Pulmonary Hypertension:  -Continue home Remodulin subQ 51 ng/kg/min.  -midline in place by IR on 4/11  -Check BNP  -TTE similar to prior   -Pulmonology following    #Chronic PE  - Xarelto dc'ed in anticipation potential I&D  - Xarelto restarted on 4/14 post surgical aspiration of LLQ collection    Pulmonary:  #Pulmonary Hypertension:  -Continue home Remodulin subQ 51 ng/kg/min.  -midline in place by IR on 4/11  -Check BNP  -TTE similar to prior   -Pulmonology following    #Asthma  Continue home ipratropium (Atrovent) nebulizer PRN.  Continue home budesonide/formoterol (Pulmicort), tiotropium (Spiriva), and PRN nebulizers.  Continue fluticasone (Flonase).  Maintain supplemental O2 2L NC, keep SpO2 > 92%, avoid hyperoxia.    #Chronic PE:  - Xarelto dc'ed in anticipation potential I&D  - Xarelto restarted on 4/14 post surgical aspiration of LLQ collection      #Lung Transplant Evaluation  Ongoing pre-lung transplant workup.  Transplant Pulm requesting LLQ abscess I&D for medical optimization for lung transplant candidacy   Seen by pulmonary transplant service.  Patient is being considered for inpatient transplant workup per Dr. Wise.  Needs cardiac MRI (requires transition to MRI-compatible pump—pulmonary transplant team working on this).  cardiac MRI on 4/14 @ 01:00 PM     /Renal:  -no active issues  -replete electrolytes as needed     GI:  #Abdominal Abscess  -linezolid 600 mg PO q12h for 10-14 days until 4/16 was dc'ed by ID and switched to daptomycin 6 mg/kg until 4/19  -Per general surgery, no acute surgical intervention at this time for abscess.   -Transplant Pulm requesting LLQ abscess I&D for medical optimization for lung transplant candidacy   -s/p LLQ collection aspiration on 4/13    #constipation  -Continue Miralax BID and senna    Hematology/Onc:  #Iron Deficiency Anemia (JULIA)  -Address once acute issues are stabilized. (No specific plan in the original note).    ID:  #Abdominal Abscess  linezolid 600 mg PO q12h for 10-14 days until 4/16 was dc'ed by ID and switched to daptomycin 6 mg/kg until 4/19  f/u ID recommendations.

## 2025-04-14 NOTE — PROCEDURE NOTE - ADDITIONAL PROCEDURE DETAILS
PRE MRI  1) Presenting rhythm: SR in the 60's  2) Measured data WNL, NL PM function, Not PM dependent.  3) Since 12/14/2022: AP <1%,  <1%, AT/AF burden <1%  4) Changes made: MRI Protection mode turned on and pacing mode changed to off  5) Call post MRI to reprogram pacemaker.     AVRIL Corey, NP-C  [id number]

## 2025-04-14 NOTE — PROGRESS NOTE ADULT - SUBJECTIVE AND OBJECTIVE BOX
Surgery Progress Note    S: Patient seen and examined. No acute events overnight. Denies fever, chills, abdominal pain or pain at the LLQ aspiration site.    O:  Physical Exam:  Gen: Laying in bed, NAD  Resp: Unlabored breathing  Abd: soft, non-distended, non-tender, LLQ aspiration site with small amount of bloody drainage from needle site, non-tender  Ext: Moves 4 extremities spontaneously    Vital Signs Last 24 Hrs  T(C): 36.6 (14 Apr 2025 08:00), Max: 36.9 (13 Apr 2025 16:00)  T(F): 97.8 (14 Apr 2025 08:00), Max: 98.4 (13 Apr 2025 16:00)  HR: 75 (14 Apr 2025 09:00) (66 - 91)  BP: 90/54 (14 Apr 2025 09:00) (81/46 - 120/57)  BP(mean): 69 (14 Apr 2025 09:00) (65 - 82)  RR: 20 (14 Apr 2025 09:00) (11 - 45)  SpO2: 100% (14 Apr 2025 09:00) (92% - 100%)    Parameters below as of 14 Apr 2025 08:00  Patient On (Oxygen Delivery Method): nasal cannula  O2 Flow (L/min): 2      I&O's Detail    13 Apr 2025 07:01  -  14 Apr 2025 07:00  --------------------------------------------------------  IN:    IV PiggyBack: 50 mL    Oral Fluid: 600 mL  Total IN: 650 mL    OUT:    Voided (mL): 400 mL  Total OUT: 400 mL    Total NET: 250 mL                                11.0   9.61  )-----------( 337      ( 13 Apr 2025 22:36 )             34.3       04-13    137  |  103  |  11  ----------------------------<  84  4.3   |  22  |  1.06    Ca    8.5      13 Apr 2025 22:36  Phos  3.0     04-13  Mg     2.1     04-13    TPro  6.8  /  Alb  3.8  /  TBili  0.8  /  DBili  x   /  AST  11  /  ALT  7[L]  /  AlkPhos  62  04-13

## 2025-04-14 NOTE — PROGRESS NOTE ADULT - ASSESSMENT
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 Patient p/w right sided abdominal pain x2 weeks and recurrent c/f abscess, was started on doxycycline outpatient however pain and swelling have worsened. Surgery performed an InD on 4/3. New site has some induration palpable. Previous CT on 4/2 noted some small collection in that area but no recent imaging to define collections.     Recommendations:   - S/p bedside aspiration of LLQ collection 4/13  - Cultures sent  - Surgery to sign off. Please reconsult as needed.    ACS/Trauma  [id number]

## 2025-04-15 LAB
-  AZTREONAM: SIGNIFICANT CHANGE UP
-  CEFEPIME: SIGNIFICANT CHANGE UP
-  CEFTAZIDIME: SIGNIFICANT CHANGE UP
-  CIPROFLOXACIN: SIGNIFICANT CHANGE UP
-  IMIPENEM: SIGNIFICANT CHANGE UP
-  LEVOFLOXACIN: SIGNIFICANT CHANGE UP
-  MEROPENEM: SIGNIFICANT CHANGE UP
-  PIPERACILLIN/TAZOBACTAM: SIGNIFICANT CHANGE UP
ALBUMIN SERPL ELPH-MCNC: 3.5 G/DL — SIGNIFICANT CHANGE UP (ref 3.3–5)
ALP SERPL-CCNC: 53 U/L — SIGNIFICANT CHANGE UP (ref 40–120)
ALT FLD-CCNC: 7 U/L — LOW (ref 10–45)
ANION GAP SERPL CALC-SCNC: 13 MMOL/L — SIGNIFICANT CHANGE UP (ref 5–17)
APTT BLD: 51.3 SEC — HIGH (ref 24.5–35.6)
AST SERPL-CCNC: 11 U/L — SIGNIFICANT CHANGE UP (ref 10–40)
BILIRUB SERPL-MCNC: 0.6 MG/DL — SIGNIFICANT CHANGE UP (ref 0.2–1.2)
BUN SERPL-MCNC: 9 MG/DL — SIGNIFICANT CHANGE UP (ref 7–23)
CALCIUM SERPL-MCNC: 8.2 MG/DL — LOW (ref 8.4–10.5)
CHLORIDE SERPL-SCNC: 103 MMOL/L — SIGNIFICANT CHANGE UP (ref 96–108)
CO2 SERPL-SCNC: 20 MMOL/L — LOW (ref 22–31)
CREAT SERPL-MCNC: 1 MG/DL — SIGNIFICANT CHANGE UP (ref 0.5–1.3)
EGFR: 71 ML/MIN/1.73M2 — SIGNIFICANT CHANGE UP
EGFR: 71 ML/MIN/1.73M2 — SIGNIFICANT CHANGE UP
FERRITIN SERPL-MCNC: 91 NG/ML — SIGNIFICANT CHANGE UP (ref 15–150)
GAS PNL BLDV: SIGNIFICANT CHANGE UP
GLUCOSE SERPL-MCNC: 77 MG/DL — SIGNIFICANT CHANGE UP (ref 70–99)
HCT VFR BLD CALC: 31.8 % — LOW (ref 34.5–45)
HGB BLD-MCNC: 10 G/DL — LOW (ref 11.5–15.5)
INR BLD: 2.21 RATIO — HIGH (ref 0.85–1.16)
IRON SATN MFR SERPL: 13 % — LOW (ref 14–50)
IRON SATN MFR SERPL: 46 UG/DL — SIGNIFICANT CHANGE UP (ref 30–160)
MAGNESIUM SERPL-MCNC: 2.1 MG/DL — SIGNIFICANT CHANGE UP (ref 1.6–2.6)
MCHC RBC-ENTMCNC: 22.4 PG — LOW (ref 27–34)
MCHC RBC-ENTMCNC: 31.4 G/DL — LOW (ref 32–36)
MCV RBC AUTO: 71.1 FL — LOW (ref 80–100)
METHOD TYPE: SIGNIFICANT CHANGE UP
NRBC BLD AUTO-RTO: 0 /100 WBCS — SIGNIFICANT CHANGE UP (ref 0–0)
PHOSPHATE SERPL-MCNC: 2.6 MG/DL — SIGNIFICANT CHANGE UP (ref 2.5–4.5)
PLATELET # BLD AUTO: 273 K/UL — SIGNIFICANT CHANGE UP (ref 150–400)
POTASSIUM SERPL-MCNC: 4 MMOL/L — SIGNIFICANT CHANGE UP (ref 3.5–5.3)
POTASSIUM SERPL-SCNC: 4 MMOL/L — SIGNIFICANT CHANGE UP (ref 3.5–5.3)
PROT SERPL-MCNC: 6.1 G/DL — SIGNIFICANT CHANGE UP (ref 6–8.3)
PROTHROM AB SERPL-ACNC: 25 SEC — HIGH (ref 9.9–13.4)
RBC # BLD: 4.47 M/UL — SIGNIFICANT CHANGE UP (ref 3.8–5.2)
RBC # FLD: 16.2 % — HIGH (ref 10.3–14.5)
SODIUM SERPL-SCNC: 136 MMOL/L — SIGNIFICANT CHANGE UP (ref 135–145)
TIBC SERPL-MCNC: 355 UG/DL — SIGNIFICANT CHANGE UP (ref 220–430)
UIBC SERPL-MCNC: 310 UG/DL — SIGNIFICANT CHANGE UP (ref 110–370)
WBC # BLD: 10.69 K/UL — HIGH (ref 3.8–10.5)
WBC # FLD AUTO: 10.69 K/UL — HIGH (ref 3.8–10.5)

## 2025-04-15 PROCEDURE — 74181 MRI ABDOMEN W/O CONTRAST: CPT | Mod: 26

## 2025-04-15 PROCEDURE — 99232 SBSQ HOSP IP/OBS MODERATE 35: CPT

## 2025-04-15 PROCEDURE — 99233 SBSQ HOSP IP/OBS HIGH 50: CPT | Mod: GC

## 2025-04-15 PROCEDURE — 83020 HEMOGLOBIN ELECTROPHORESIS: CPT | Mod: 26

## 2025-04-15 PROCEDURE — 93286 PERI-PX EVAL PM/LDLS PM IP: CPT | Mod: 26,76

## 2025-04-15 PROCEDURE — 99233 SBSQ HOSP IP/OBS HIGH 50: CPT

## 2025-04-15 RX ORDER — LORAZEPAM 4 MG/ML
2 VIAL (ML) INJECTION ONCE
Refills: 0 | Status: DISCONTINUED | OUTPATIENT
Start: 2025-04-15 | End: 2025-04-15

## 2025-04-15 RX ORDER — LORAZEPAM 4 MG/ML
1 VIAL (ML) INJECTION ONCE
Refills: 0 | Status: DISCONTINUED | OUTPATIENT
Start: 2025-04-15 | End: 2025-04-15

## 2025-04-15 RX ORDER — FLUCONAZOLE 150 MG
150 TABLET ORAL ONCE
Refills: 0 | Status: COMPLETED | OUTPATIENT
Start: 2025-04-15 | End: 2025-04-15

## 2025-04-15 RX ORDER — CEFEPIME 2 G/20ML
INJECTION, POWDER, FOR SOLUTION INTRAVENOUS
Refills: 0 | Status: COMPLETED | OUTPATIENT
Start: 2025-04-15 | End: 2025-04-21

## 2025-04-15 RX ORDER — IRON SUCROSE 20 MG/ML
300 INJECTION, SOLUTION INTRAVENOUS ONCE
Refills: 0 | Status: COMPLETED | OUTPATIENT
Start: 2025-04-15 | End: 2025-04-15

## 2025-04-15 RX ORDER — CEFEPIME 2 G/20ML
2000 INJECTION, POWDER, FOR SOLUTION INTRAVENOUS ONCE
Refills: 0 | Status: COMPLETED | OUTPATIENT
Start: 2025-04-15 | End: 2025-04-15

## 2025-04-15 RX ORDER — CEFEPIME 2 G/20ML
2000 INJECTION, POWDER, FOR SOLUTION INTRAVENOUS EVERY 8 HOURS
Refills: 0 | Status: COMPLETED | OUTPATIENT
Start: 2025-04-16 | End: 2025-04-21

## 2025-04-15 RX ORDER — ACETAMINOPHEN 500 MG/5ML
1000 LIQUID (ML) ORAL ONCE
Refills: 0 | Status: COMPLETED | OUTPATIENT
Start: 2025-04-15 | End: 2025-04-15

## 2025-04-15 RX ADMIN — Medication 2 MILLIGRAM(S): at 16:52

## 2025-04-15 RX ADMIN — Medication 9 MILLIGRAM(S): at 22:47

## 2025-04-15 RX ADMIN — CEFEPIME 100 MILLIGRAM(S): 2 INJECTION, POWDER, FOR SOLUTION INTRAVENOUS at 19:06

## 2025-04-15 RX ADMIN — FLUTICASONE PROPIONATE 1 SPRAY(S): 50 SPRAY, METERED NASAL at 05:29

## 2025-04-15 RX ADMIN — IRON SUCROSE 176.67 MILLIGRAM(S): 20 INJECTION, SOLUTION INTRAVENOUS at 17:31

## 2025-04-15 RX ADMIN — RIVAROXABAN 15 MILLIGRAM(S): 10 TABLET, FILM COATED ORAL at 17:24

## 2025-04-15 RX ADMIN — Medication 40 MILLIGRAM(S): at 05:29

## 2025-04-15 RX ADMIN — Medication 10 MILLIGRAM(S): at 17:24

## 2025-04-15 RX ADMIN — KETOROLAC TROMETHAMINE 30 MILLIGRAM(S): 30 INJECTION, SOLUTION INTRAMUSCULAR; INTRAVENOUS at 17:24

## 2025-04-15 RX ADMIN — MIDODRINE HYDROCHLORIDE 20 MILLIGRAM(S): 5 TABLET ORAL at 05:29

## 2025-04-15 RX ADMIN — KETOROLAC TROMETHAMINE 30 MILLIGRAM(S): 30 INJECTION, SOLUTION INTRAMUSCULAR; INTRAVENOUS at 17:50

## 2025-04-15 RX ADMIN — Medication 25 MILLIGRAM(S): at 08:55

## 2025-04-15 RX ADMIN — Medication 1 APPLICATION(S): at 05:30

## 2025-04-15 RX ADMIN — MONTELUKAST SODIUM 10 MILLIGRAM(S): 10 TABLET ORAL at 11:06

## 2025-04-15 RX ADMIN — MIDODRINE HYDROCHLORIDE 20 MILLIGRAM(S): 5 TABLET ORAL at 11:06

## 2025-04-15 RX ADMIN — Medication 1000 MILLIGRAM(S): at 13:15

## 2025-04-15 RX ADMIN — KETOROLAC TROMETHAMINE 30 MILLIGRAM(S): 30 INJECTION, SOLUTION INTRAMUSCULAR; INTRAVENOUS at 09:14

## 2025-04-15 RX ADMIN — FLUTICASONE PROPIONATE 1 SPRAY(S): 50 SPRAY, METERED NASAL at 17:34

## 2025-04-15 RX ADMIN — DAPTOMYCIN 124 MILLIGRAM(S): 500 INJECTION, POWDER, LYOPHILIZED, FOR SOLUTION INTRAVENOUS at 11:47

## 2025-04-15 RX ADMIN — KETOROLAC TROMETHAMINE 30 MILLIGRAM(S): 30 INJECTION, SOLUTION INTRAMUSCULAR; INTRAVENOUS at 00:00

## 2025-04-15 RX ADMIN — Medication 25 MILLIGRAM(S): at 17:24

## 2025-04-15 RX ADMIN — Medication 10 MILLIGRAM(S): at 02:00

## 2025-04-15 RX ADMIN — MIDODRINE HYDROCHLORIDE 20 MILLIGRAM(S): 5 TABLET ORAL at 17:23

## 2025-04-15 RX ADMIN — Medication 150 MILLIGRAM(S): at 10:22

## 2025-04-15 RX ADMIN — Medication 400 MILLIGRAM(S): at 12:54

## 2025-04-15 RX ADMIN — KETOROLAC TROMETHAMINE 30 MILLIGRAM(S): 30 INJECTION, SOLUTION INTRAMUSCULAR; INTRAVENOUS at 08:54

## 2025-04-15 NOTE — PROGRESS NOTE ADULT - SUBJECTIVE AND OBJECTIVE BOX
Lung Transplant Progress Note  =====================================================  24 hour events:     T(C): 36.4 (04-15-25 @ 12:00), Max: 37.1 (04-15-25 @ 04:00)  HR: 79 (04-15-25 @ 14:00) (73 - 93)  BP: 127/67 (04-15-25 @ 14:00) (81/46 - 144/86)  RR: 16 (04-15-25 @ 14:00) (13 - 49)  SpO2: 100% (04-15-25 @ 14:00) (97% - 100%)    PAST MEDICAL & SURGICAL HISTORY:  Anemia      Pulmonary hypertension      Asthma  On home O2 nightly at 2L via NC      PE (pulmonary thromboembolism)  on Xarelto 10 mg daily      Sinusitis      Right heart failure      H/O pulmonary hypertension      Pulmonary embolism      History of tachycardia      On supplemental oxygen by nasal cannula  O2 @ 2L      Pacemaker      Right atrial thrombus      Hypotension      Pacemaker      History of pacemaker  12/19 - [email or web address] model Ingevity 4469    Home Meds: Home Medications:  Atrovent 18 mcg/inh inhalation aerosol: 1 inhaled 4 times a day as needed for  shortness of breath and/or wheezing (02 Apr 2025 16:24)  omeprazole 40 mg oral delayed release capsule: 1 cap(s) orally once a day (02 Apr 2025 16:24)  Remodulin 5 mg/mL injectable solution: 1 application injectable once a day via her own SQ PUMP (02 Apr 2025 16:24)  sildenafil 20 mg oral tablet: 1 tab(s) orally every 8 hours (02 Apr 2025 16:24)  Xarelto 15 mg oral tablet: 1 tab(s) orally once a day resume tomorrow 2/6 (02 Apr 2025 16:24)    Allergies: Allergies    vancomycin (Rash)  penicillin (Rash)  adhesives (Rash)    Intolerances    REVIEW OF SYSTEMS  [ ] A ten-point review of systems was otherwise negative except as noted.  [ ] Due to altered mental status/intubation, subjective information were not able to be obtained from the patient. History was obtained, to the extent possible, from review of the chart and collateral sources of information.      CURRENT MEDICATIONS:   Neurologic Medications  acetaminophen     Tablet .. 1000 milliGRAM(s) Oral every 6 hours PRN Mild Pain (1 - 3), Moderate Pain (4 - 6)  ketorolac   Injectable 30 milliGRAM(s) IV Push every 8 hours PRN Migraine  LORazepam   Injectable 1 milliGRAM(s) IV Push once  LORazepam   Injectable 1 milliGRAM(s) IV Push once PRN Anxiety  meclizine 12.5 milliGRAM(s) Oral once PRN Dizziness  melatonin 9 milliGRAM(s) Oral at bedtime PRN Insomnia    Respiratory Medications  albuterol/ipratropium for Nebulization 3 milliLiter(s) Nebulizer every 6 hours PRN Shortness of Breath  diphenhydrAMINE Injectable 25 milliGRAM(s) IV Push every 8 hours PRN Migraine  montelukast 10 milliGRAM(s) Oral daily    Cardiovascular Medications  midodrine. 20 milliGRAM(s) Oral three times a day    Gastrointestinal Medications  metoclopramide 10 milliGRAM(s) Oral every 8 hours PRN Migraine  pantoprazole    Tablet 40 milliGRAM(s) Oral before breakfast  polyethylene glycol 3350 17 Gram(s) Oral two times a day  senna 2 Tablet(s) Oral at bedtime    Genitourinary Medications    Hematologic/Oncologic Medications  influenza   Vaccine 0.5 milliLiter(s) IntraMuscular once  rivaroxaban 15 milliGRAM(s) Oral with dinner    Antimicrobial/Immunologic Medications  DAPTOmycin IVPB      DAPTOmycin IVPB 600 milliGRAM(s) IV Intermittent every 24 hours    Endocrine/Metabolic Medications    Topical/Other Medications  chlorhexidine 2% Cloths 1 Application(s) Topical <User Schedule>  fluticasone propionate 50 MICROgram(s)/spray Nasal Spray 1 Spray(s) Both Nostrils two times a day  remodulin (treprostinil) SubQ infusion 1 Dose(s) 1 Dose(s) SubCutaneous Continuous Pump      INS/OUTS (last 24 hours):  I&O's Summary    14 Apr 2025 07:01  -  15 Apr 2025 07:00  --------------------------------------------------------  IN: 850 mL / OUT: 500 mL / NET: 350 mL    15 Apr 2025 07:01  -  15 Apr 2025 14:40  --------------------------------------------------------  IN: 100 mL / OUT: 400 mL / NET: -300 mL      --------------------------------------------------------------------------------------  PHYSICAL EXAM:  GENERAL: NAD  HEAD:  Atraumatic, normocephalic  EYES: EOMI, PERRLA, conjunctiva and sclera clear  NECK: Supple  HEART: RRR. +S1/S2  ABDOMEN: Soft, nondistended, nontender  EXTREMITIES: No edema, 2+ peripheral pulses  PSYCH: A&O x3  NEUROLOGY: Non-focal, motor & sensory grossly intact  SKIN: Warm & dry, no rashes or lesions    LABS:                        10.0   10.69 )-----------( 273      ( 15 Apr 2025 00:25 )             31.8     04-15    136  |  103  |  9   ----------------------------<  77  4.0   |  20[L]  |  1.00    Ca    8.2[L]      15 Apr 2025 00:25  Phos  2.6     04-15  Mg     2.1     04-15    TPro  6.1  /  Alb  3.5  /  TBili  0.6  /  DBili  x   /  AST  11  /  ALT  7[L]  /  AlkPhos  53  04-15    PT/INR - ( 15 Apr 2025 00:25 )   PT: 25.0 sec;   INR: 2.21 ratio         PTT - ( 15 Apr 2025 00:25 )  PTT:51.3 sec  Urinalysis Basic - ( 15 Apr 2025 00:25 )    Color: x / Appearance: x / SG: x / pH: x  Gluc: 77 mg/dL / Ketone: x  / Bili: x / Urobili: x   Blood: x / Protein: x / Nitrite: x   Leuk Esterase: x / RBC: x / WBC x   Sq Epi: x / Non Sq Epi: x / Bacteria: x        Culture - Wound Aerobic/Anaerobic (collected 13 Apr 2025 14:21)  Source: Exudate Incision  Preliminary Report (14 Apr 2025 19:42):    Rare Pseudomonas aeruginosa      CAPILLARY BLOOD GLUCOSE          RADIOLOGY:   Lung Transplant Progress Note  =====================================================  24 hour events:     T(C): 36.4 (04-15-25 @ 12:00), Max: 37.1 (04-15-25 @ 04:00)  HR: 79 (04-15-25 @ 14:00) (73 - 93)  BP: 127/67 (04-15-25 @ 14:00) (81/46 - 144/86)  RR: 16 (04-15-25 @ 14:00) (13 - 49)  SpO2: 100% (04-15-25 @ 14:00) (97% - 100%)    PAST MEDICAL & SURGICAL HISTORY:  Anemia      Pulmonary hypertension      Asthma  On home O2 nightly at 2L via NC      PE (pulmonary thromboembolism)  on Xarelto 10 mg daily      Sinusitis      Right heart failure      H/O pulmonary hypertension      Pulmonary embolism      History of tachycardia      On supplemental oxygen by nasal cannula  O2 @ 2L      Pacemaker      Right atrial thrombus      Hypotension      Pacemaker      History of pacemaker  12/19 - [employer] model Ingevity 4469    Home Meds: Home Medications:  Atrovent 18 mcg/inh inhalation aerosol: 1 inhaled 4 times a day as needed for  shortness of breath and/or wheezing (02 Apr 2025 16:24)  omeprazole 40 mg oral delayed release capsule: 1 cap(s) orally once a day (02 Apr 2025 16:24)  Remodulin 5 mg/mL injectable solution: 1 application injectable once a day via her own SQ PUMP (02 Apr 2025 16:24)  sildenafil 20 mg oral tablet: 1 tab(s) orally every 8 hours (02 Apr 2025 16:24)  Xarelto 15 mg oral tablet: 1 tab(s) orally once a day resume tomorrow 2/6 (02 Apr 2025 16:24)    Allergies: Allergies    vancomycin (Rash)  penicillin (Rash)  adhesives (Rash)    Intolerances    REVIEW OF SYSTEMS  [ ] A ten-point review of systems was otherwise negative except as noted.  [ ] Due to altered mental status/intubation, subjective information were not able to be obtained from the patient. History was obtained, to the extent possible, from review of the chart and collateral sources of information.      CURRENT MEDICATIONS:   Neurologic Medications  acetaminophen     Tablet .. 1000 milliGRAM(s) Oral every 6 hours PRN Mild Pain (1 - 3), Moderate Pain (4 - 6)  ketorolac   Injectable 30 milliGRAM(s) IV Push every 8 hours PRN Migraine  LORazepam   Injectable 1 milliGRAM(s) IV Push once  LORazepam   Injectable 1 milliGRAM(s) IV Push once PRN Anxiety  meclizine 12.5 milliGRAM(s) Oral once PRN Dizziness  melatonin 9 milliGRAM(s) Oral at bedtime PRN Insomnia    Respiratory Medications  albuterol/ipratropium for Nebulization 3 milliLiter(s) Nebulizer every 6 hours PRN Shortness of Breath  diphenhydrAMINE Injectable 25 milliGRAM(s) IV Push every 8 hours PRN Migraine  montelukast 10 milliGRAM(s) Oral daily    Cardiovascular Medications  midodrine. 20 milliGRAM(s) Oral three times a day    Gastrointestinal Medications  metoclopramide 10 milliGRAM(s) Oral every 8 hours PRN Migraine  pantoprazole    Tablet 40 milliGRAM(s) Oral before breakfast  polyethylene glycol 3350 17 Gram(s) Oral two times a day  senna 2 Tablet(s) Oral at bedtime    Genitourinary Medications    Hematologic/Oncologic Medications  influenza   Vaccine 0.5 milliLiter(s) IntraMuscular once  rivaroxaban 15 milliGRAM(s) Oral with dinner    Antimicrobial/Immunologic Medications  DAPTOmycin IVPB      DAPTOmycin IVPB 600 milliGRAM(s) IV Intermittent every 24 hours    Endocrine/Metabolic Medications    Topical/Other Medications  chlorhexidine 2% Cloths 1 Application(s) Topical <User Schedule>  fluticasone propionate 50 MICROgram(s)/spray Nasal Spray 1 Spray(s) Both Nostrils two times a day  remodulin (treprostinil) SubQ infusion 1 Dose(s) 1 Dose(s) SubCutaneous Continuous Pump      INS/OUTS (last 24 hours):  I&O's Summary    14 Apr 2025 07:01  -  15 Apr 2025 07:00  --------------------------------------------------------  IN: 850 mL / OUT: 500 mL / NET: 350 mL    15 Apr 2025 07:01  -  15 Apr 2025 14:40  --------------------------------------------------------  IN: 100 mL / OUT: 400 mL / NET: -300 mL      --------------------------------------------------------------------------------------  PHYSICAL EXAM:  GENERAL: NAD  HEAD:  Atraumatic, normocephalic  EYES: EOMI, PERRLA, conjunctiva and sclera clear  NECK: Supple  HEART: RRR. +S1/S2  ABDOMEN: Soft, nondistended, nontender  EXTREMITIES: No edema, 2+  SKIN: Warm & dry, no rashes or lesions    LABS:                        10.0   10.69 )-----------( 273      ( 15 Apr 2025 00:25 )             31.8     04-15    136  |  103  |  9   ----------------------------<  77  4.0   |  20[L]  |  1.00    Ca    8.2[L]      15 Apr 2025 00:25  Phos  2.6     04-15  Mg     2.1     04-15    TPro  6.1  /  Alb  3.5  /  TBili  0.6  /  DBili  x   /  AST  11  /  ALT  7[L]  /  AlkPhos  53  04-15    PT/INR - ( 15 Apr 2025 00:25 )   PT: 25.0 sec;   INR: 2.21 ratio         PTT - ( 15 Apr 2025 00:25 )  PTT:51.3 sec  Urinalysis Basic - ( 15 Apr 2025 00:25 )    Color: x / Appearance: x / SG: x / pH: x  Gluc: 77 mg/dL / Ketone: x  / Bili: x / Urobili: x   Blood: x / Protein: x / Nitrite: x   Leuk Esterase: x / RBC: x / WBC x   Sq Epi: x / Non Sq Epi: x / Bacteria: x        Culture - Wound Aerobic/Anaerobic (collected 13 Apr 2025 14:21)  Source: Exudate Incision  Preliminary Report (14 Apr 2025 19:42):    Rare Pseudomonas aeruginosa      CAPILLARY BLOOD GLUCOSE          RADIOLOGY:

## 2025-04-15 NOTE — DIETITIAN INITIAL EVALUATION ADULT - PERSON TAUGHT/METHOD
Discussed strategies to promote adequate protein and micronutrient intake while on Ozempic. Encouraged prioritizing protein, small frequent meals. Pt expressed understanding, all questions/concerns addressed a this time./verbal instruction/teach back - (Patient repeats in own words)/patient instructed

## 2025-04-15 NOTE — DIETITIAN INITIAL EVALUATION ADULT - NSICDXPASTSURGICALHX_GEN_ALL_CORE_FT
PAST SURGICAL HISTORY:  History of pacemaker 12/19 - [employer] model Ingevity 4469    Pacemaker

## 2025-04-15 NOTE — DIETITIAN INITIAL EVALUATION ADULT - OTHER INFO
- Under evaluation for lung transplant (nutrition eval completed 10/2024)  - HbA1c 5.5%  - On Ozempic at home for weight loss; received dose inpatient on 4/4.     Weight Hx:  - Pt states prior to starting Ozempic she was 220lbs. Per RD note 10/2025 pt 219lbs at time of evaluation. Endorses some weight loss since. Dosing weight 204.5lbs (4/11). Current weight 208.2lbs via bedscale. ?accuracy given possible fluid shifts, bedscale discrepancies.

## 2025-04-15 NOTE — DIETITIAN INITIAL EVALUATION ADULT - ORAL INTAKE PTA/DIET HISTORY
PTA pt reports decreased appetite since starting Ozempic in December. Typically eats 1 meal per day, has started to try Premier protein shakes. No micronutrient supplementation PTA. NKFA or intolerances. No chewing/swallowing difficulty reported.

## 2025-04-15 NOTE — PROGRESS NOTE ADULT - ASSESSMENT
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 was admitted to [facility] 10/13/24-10/29/24 for pre-transplant expedited workup. Sent to ED 4/2/25 from [facility] for abdominal abscess    CXR (4/2) Perihilar opacities, likely represents enlarged pulmonary arteries. Otherwise clear lungs.    CT A/P (4/2) Peripherally enhancing lesion in the right mid abdominal wall subjacent to diffuse skin thickening measuring 5.0 x 2.1 x 4.0 cm consistent with phlegmon/abscess. Underlying mass is not excluded. Superficial skin thickening in the left abdominal wall at the level of   the umbilicus. Underlying small abscess measures 8 mm.    s/p bedside abdominal wall abscess I&D on 4/3    #MRSA Abdominal Wall Abscess, Hypotension  --General surgery without plans for incision and drainage of the left-sided abdominal wall fluid collection  --Continue daptomycin 600 mg IV every 24 hours. Adding on CPK with AM labs  --Follow up on preliminary blood cultures    #Pre-Lung Transplant Evaluation  COVID19 Nucleocapsid Antibody Negative  COVID19 Rajinder Antibody Positive  HAV IgG Negative  HBVs Ab Negative  HBVsAg Negative  HBVc Ab Negative  HCV Ab Negative  HSV 1 IgG Positive  HSV 2 IgG Negative  EBV IgG Positive  CMV IgG Positive  VZV IgG Positive  Measles IgG Positive  Mumps IgG Positive  Rubella IgG Positive  Quantiferon Gold Negative  HIV Ag/Ab by CMIA Negative  Syphilis Screen Negative  Toxoplasma IgG Negative  Strongyloides Ab Negative  Trypanosoma cruzi Ab Negative  --ID clearance for lung transplant pending treatment of abdominal wall abscess    #Encounter to Vaccinate Patient  COVID19: Would benefit from COVID19 [id number] Vaccine Dose  Influenza: needs this , can be given at pulmonology office, deferring today not to give 2 reactogenic vaccines  RSV: Arexvy 3/6/25  Pneumococcal: States she had pneumococcal vaccination ~2022  HAV: Would benefit from Havrix  HBV: Heplisav Dose 1 3/6/25. Dose 2 of Heplisav prior to discharge  MMR: Rubella IgG pending. Mumps and Measles immune  Varicella: Immune will not require further vaccination  Shingles: Will require Shingrix  Tdap: Will require Tdap    I will continue to follow. Please feel free to contact me with any further questions.    Rogelio Gonsales M.D.  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 was admitted to [facility] 10/13/24-10/29/24 for pre-transplant expedited workup. Sent to ED 4/2/25 from [facility] for abdominal abscess    CXR (4/2) Perihilar opacities, likely represents enlarged pulmonary arteries. Otherwise clear lungs.    CT A/P (4/2) Peripherally enhancing lesion in the right mid abdominal wall subjacent to diffuse skin thickening measuring 5.0 x 2.1 x 4.0 cm consistent with phlegmon/abscess. Underlying mass is not excluded. Superficial skin thickening in the left abdominal wall at the level of   the umbilicus. Underlying small abscess measures 8 mm.    s/p bedside abdominal wall abscess I&D on 4/3    #MRSA Abdominal Wall Abscess, Hypotension  --General surgery without plans for incision and drainage of the left-sided abdominal wall fluid collection  --Continue daptomycin 600 mg IV every 24 hours. Adding on CPK with AM labs  --Follow up on  blood cultures are negative  -- abdominal wall wound growing pseudomonas  would begin Cefepime 2gm IV q 8hr    #Pre-Lung Transplant Evaluation  COVID19 Nucleocapsid Antibody Negative  COVID19 Rajinder Antibody Positive  HAV IgG Negative  HBVs Ab Negative  HBVsAg Negative  HBVc Ab Negative  HCV Ab Negative  HSV 1 IgG Positive  HSV 2 IgG Negative  EBV IgG Positive  CMV IgG Positive  VZV IgG Positive  Measles IgG Positive  Mumps IgG Positive  Rubella IgG Positive  Quantiferon Gold Negative  HIV Ag/Ab by CMIA Negative  Syphilis Screen Negative  Toxoplasma IgG Negative  Strongyloides Ab Negative  Trypanosoma cruzi Ab Negative  --ID clearance for lung transplant pending treatment of abdominal wall abscess    #Encounter to Vaccinate Patient  COVID19: Would benefit from COVID19 [id number] Vaccine Dose  Influenza: needs this , can be given at pulmonology office, deferring today not to give 2 reactogenic vaccines  RSV: Arexvy 3/6/25  Pneumococcal: States she had pneumococcal vaccination ~2022  HAV: Would benefit from Havrix  HBV: Heplisav Dose 1 3/6/25. Dose 2 of Heplisav prior to discharge  MMR: Rubella IgG pending. Mumps and Measles immune  Varicella: Immune will not require further vaccination  Shingles: Will require Shingrix  Tdap: Will require Tdap    I will continue to follow. Please feel free to contact me with any further questions.    Sreekanth Jauregui MD  Can be called via Teams  After 5pm/weekends [phone]

## 2025-04-15 NOTE — PROCEDURE NOTE - NSPERFORMEDBY_GEN_A_CORE
BSC Representative/Other
Resident
[place] Scientific rep/Other
[employer] Scientific rep/Other
[facility] Representative/Other
Dr. Tello/Attending
RAYMOND Hall-C/Myself/PA

## 2025-04-15 NOTE — DIETITIAN INITIAL EVALUATION ADULT - ADD RECOMMEND
Recommend daily multivitamin to prevent micronutrient deficiencies. Reinforce nutrition education as needed - RD remains available.

## 2025-04-15 NOTE — DIETITIAN INITIAL EVALUATION ADULT - PERTINENT LABORATORY DATA
04-15    136  |  103  |  9   ----------------------------<  77  4.0   |  20[L]  |  1.00    Ca    8.2[L]      15 Apr 2025 00:25  Phos  2.6     04-15  Mg     2.1     04-15    TPro  6.1  /  Alb  3.5  /  TBili  0.6  /  DBili  x   /  AST  11  /  ALT  7[L]  /  AlkPhos  53  04-15  A1C with Estimated Average Glucose Result: 5.5 % (10-22-24 @ 15:11)  A1C with Estimated Average Glucose Result: 5.6 % (10-21-24 @ 08:42)

## 2025-04-15 NOTE — PROGRESS NOTE ADULT - ASSESSMENT
************************  Amirah Rowe DO   Emergency Medicine PGY-1  ************************    Patient is a 44y old  Female who presents with a chief complaint of Abdominal abscess (13 Apr 2025 07:19)    Overnight no acute events    MEDICATIONS  (STANDING):  buDESOnide    Inhalation Suspension 0.5 milliGRAM(s) Inhalation daily  chlorhexidine 2% Cloths 1 Application(s) Topical <User Schedule>  DAPTOmycin IVPB      DAPTOmycin IVPB 600 milliGRAM(s) IV Intermittent every 24 hours  fluticasone propionate 50 MICROgram(s)/spray Nasal Spray 1 Spray(s) Both Nostrils two times a day  heparin   Injectable 5000 Unit(s) SubCutaneous every 8 hours  influenza   Vaccine 0.5 milliLiter(s) IntraMuscular once  midodrine. 20 milliGRAM(s) Oral three times a day  montelukast 10 milliGRAM(s) Oral daily  pantoprazole    Tablet 40 milliGRAM(s) Oral before breakfast  polyethylene glycol 3350 17 Gram(s) Oral two times a day  remodulin (treprostinil) SubQ infusion 1 Dose(s) 1 Dose(s) SubCutaneous Continuous Pump  senna 2 Tablet(s) Oral at bedtime    MEDICATIONS  (PRN):  acetaminophen     Tablet .. 1000 milliGRAM(s) Oral every 6 hours PRN Mild Pain (1 - 3), Moderate Pain (4 - 6)  albuterol/ipratropium for Nebulization 3 milliLiter(s) Nebulizer every 6 hours PRN Shortness of Breath  diphenhydrAMINE Injectable 25 milliGRAM(s) IV Push every 8 hours PRN Migraine  ketorolac   Injectable 30 milliGRAM(s) IV Push every 8 hours PRN Migraine  meclizine 12.5 milliGRAM(s) Oral once PRN Dizziness  melatonin 9 milliGRAM(s) Oral at bedtime PRN Insomnia  metoclopramide 10 milliGRAM(s) Oral every 8 hours PRN Migraine  oxyCODONE    IR 2.5 milliGRAM(s) Oral every 6 hours PRN Severe Pain (7 - 10)      CAPILLARY BLOOD GLUCOSE        I&O's Summary    13 Apr 2025 07:01  -  14 Apr 2025 07:00  --------------------------------------------------------  IN: 650 mL / OUT: 400 mL / NET: 250 mL        PHYSICAL EXAM:  Vital Signs Last 24 Hrs  T(C): 36.7 (14 Apr 2025 04:00), Max: 36.9 (13 Apr 2025 16:00)  T(F): 98 (14 Apr 2025 04:00), Max: 98.4 (13 Apr 2025 16:00)  HR: 83 (14 Apr 2025 07:00) (66 - 91)  BP: 107/62 (14 Apr 2025 07:00) (81/46 - 120/57)  BP(mean): 81 (14 Apr 2025 07:00) (65 - 82)  RR: 17 (14 Apr 2025 07:00) (11 - 45)  SpO2: 100% (14 Apr 2025 07:00) (92% - 100%)    Parameters below as of 13 Apr 2025 20:00  Patient On (Oxygen Delivery Method): room air        PHYSICAL EXAM:  GENERAL: NAD, lying in bed comfortably  HEENT: NC/AT  CHEST/LUNG: CTAB, no increased WOB  HEART: RRR, no m/r/g  ABDOMEN: soft, NT, ND, BS+  NERVOUS SYSTEM:  A&Ox3    LABS:                        11.0   9.61  )-----------( 337      ( 13 Apr 2025 22:36 )             34.3     04-13    137  |  103  |  11  ----------------------------<  84  4.3   |  22  |  1.06    Ca    8.5      13 Apr 2025 22:36  Phos  3.0     04-13  Mg     2.1     04-13    TPro  6.8  /  Alb  3.8  /  TBili  0.8  /  DBili  x   /  AST  11  /  ALT  7[L]  /  AlkPhos  62  04-13    PT/INR - ( 13 Apr 2025 22:36 )   PT: 13.7 sec;   INR: 1.19 ratio         PTT - ( 13 Apr 2025 22:36 )  PTT:39.6 sec      Urinalysis Basic - ( 13 Apr 2025 22:36 )    Color: x / Appearance: x / SG: x / pH: x  Gluc: 84 mg/dL / Ketone: x  / Bili: x / Urobili: x   Blood: x / Protein: x / Nitrite: x   Leuk Esterase: x / RBC: x / WBC x   Sq Epi: x / Non Sq Epi: x / Bacteria: x        Culture - Blood (collected 11 Apr 2025 16:30)  Source: Blood Blood  Preliminary Report (13 Apr 2025 22:01):    No growth at 48 Hours    Culture - Blood (collected 11 Apr 2025 16:25)  Source: Blood Blood  Preliminary Report (13 Apr 2025 22:01):    No growth at 48 Hours    Assessment and Plan:    Neurologic:  -AAOx3    #migraines  -Headache cocktail per neuro: "-First line: recommend migraine medications (to be given all together at the same time if no contraindications form comorbitities): ketorolac (Toradol) 30mg IV q8h PRN (or acetaminophen 1g IV q8h PRN), metoclopramide (Reglan) 10mg q8h PRN, diphenhydramine (Benadryl) 25mg IV q8h PRN. Please repeat at least 2-3 cycles for medications to be effective."  -Currently managed with oxycodone PRN    #peripheral vertigo  -meclizine 12.5 mg PRN     Cardiovascular:  #Hypotension:  -Hold bumetanide, spironolactone, sildenafil, and nifedipine.  -midodrine 20 mg TID.  -Heart failure signing off as repeat TTE similar to prior     #Right Heart Failure:  -Hold bumetanide, spironolactone, and nifedipine.  -Heart failure signing off as repeat TTE similar to prior   - s/p TTE  -MYRIAM for pulmonic valve eval pending      #Pulmonary Hypertension:  -Continue home Remodulin subQ 51 ng/kg/min.  -midline in place by IR on 4/11  -Check BNP  - s/p TTE  -MYRIAM for pulmonic valve eval pending  - Transplant Pulmonology following    #Chronic PE  - Xarelto dc'ed in anticipation potential I&D  - Xarelto restarted on 4/14 post surgical aspiration of LLQ collection    Pulmonary:  #Pulmonary Hypertension:  -Continue home Remodulin subQ 51 ng/kg/min.  -midline in place by IR on 4/11  -Check BNP  - s/p TTE  -MYRIAM for pulmonic valve eval pending  - Transplant Pulmonology following    #Asthma  Continue home ipratropium (Atrovent) nebulizer PRN.  Continue home budesonide/formoterol (Pulmicort), tiotropium (Spiriva), and PRN nebulizers.  Continue fluticasone (Flonase).  Maintain supplemental O2 2L NC, keep SpO2 > 92%, avoid hyperoxia.    #Chronic PE:  - Xarelto dc'ed in anticipation potential I&D  - Xarelto restarted on 4/14 post surgical aspiration of LLQ collection      #Lung Transplant Evaluation  Ongoing pre-lung transplant workup.  Transplant Pulm requesting LLQ abscess I&D for medical optimization for lung transplant candidacy   Seen by pulmonary transplant service.  Patient is being considered for inpatient transplant workup per Dr. Wise.  Needs cardiac MRI (requires transition to MRI-compatible pump—pulmonary transplant team working on this).  cardiac MRI on 4/14 @ 01:00 PM   -s/p cardiac MRI  - MRCP on 4/15  - f/u MYRIAM  - f/u CT colongraphy for further workup for iron deficiency anemia     /Renal:  -no active issues  -replete electrolytes as needed     GI:  #Abdominal Abscess  -linezolid 600 mg PO q12h for 10-14 days until 4/16 was dc'ed by ID and switched to daptomycin 6 mg/kg until 4/19  -Per general surgery, no acute surgical intervention at this time for abscess.   -Transplant Pulm requesting LLQ abscess I&D for medical optimization for lung transplant candidacy   -s/p LLQ collection aspiration on 4/13    #constipation  -Continue Miralax BID and senna    Hematology/Onc:  #Iron Deficiency Anemia (JULIA)  -outpatient HIE note from 03/21/25 with dx of microcytic anemia (iron deficiency vs hemoglobinopathy)  - f/u Hb electrophoresis   - f/u CT colonography     ID:  #Abdominal Abscess  linezolid 600 mg PO q12h for 10-14 days until 4/16 was dc'ed by ID and switched to daptomycin 6 mg/kg until 4/19  f/u ID recommendations.    #penicillin allergy   -per allergy note on 4/10 from inpatient team was recommending a graded challenge, appears to not have occurred as of yet     - administer 10% of standard amoxicillin dose, then observe for 30 minutes     - if tolerated, administer remaining 90% of standard amoxicillin dose, then observe for 60 minutes  - if patient does not develop immediate reactions then is considered to be not allergic and penicillin allergy can be delabeled  -will trial on 4/16 ^     ************************  Amirah Rowe DO   Emergency Medicine PGY-1  ************************    Patient is a 44y old  Female who presents with a chief complaint of Abdominal abscess (13 Apr 2025 07:19)    Overnight no acute events    MEDICATIONS  (STANDING):  buDESOnide    Inhalation Suspension 0.5 milliGRAM(s) Inhalation daily  chlorhexidine 2% Cloths 1 Application(s) Topical <User Schedule>  DAPTOmycin IVPB      DAPTOmycin IVPB 600 milliGRAM(s) IV Intermittent every 24 hours  fluticasone propionate 50 MICROgram(s)/spray Nasal Spray 1 Spray(s) Both Nostrils two times a day  heparin   Injectable 5000 Unit(s) SubCutaneous every 8 hours  influenza   Vaccine 0.5 milliLiter(s) IntraMuscular once  midodrine. 20 milliGRAM(s) Oral three times a day  montelukast 10 milliGRAM(s) Oral daily  pantoprazole    Tablet 40 milliGRAM(s) Oral before breakfast  polyethylene glycol 3350 17 Gram(s) Oral two times a day  remodulin (treprostinil) SubQ infusion 1 Dose(s) 1 Dose(s) SubCutaneous Continuous Pump  senna 2 Tablet(s) Oral at bedtime    MEDICATIONS  (PRN):  acetaminophen     Tablet .. 1000 milliGRAM(s) Oral every 6 hours PRN Mild Pain (1 - 3), Moderate Pain (4 - 6)  albuterol/ipratropium for Nebulization 3 milliLiter(s) Nebulizer every 6 hours PRN Shortness of Breath  diphenhydrAMINE Injectable 25 milliGRAM(s) IV Push every 8 hours PRN Migraine  ketorolac   Injectable 30 milliGRAM(s) IV Push every 8 hours PRN Migraine  meclizine 12.5 milliGRAM(s) Oral once PRN Dizziness  melatonin 9 milliGRAM(s) Oral at bedtime PRN Insomnia  metoclopramide 10 milliGRAM(s) Oral every 8 hours PRN Migraine  oxyCODONE    IR 2.5 milliGRAM(s) Oral every 6 hours PRN Severe Pain (7 - 10)      CAPILLARY BLOOD GLUCOSE        I&O's Summary    13 Apr 2025 07:01  -  14 Apr 2025 07:00  --------------------------------------------------------  IN: 650 mL / OUT: 400 mL / NET: 250 mL        PHYSICAL EXAM:  Vital Signs Last 24 Hrs  T(C): 36.7 (14 Apr 2025 04:00), Max: 36.9 (13 Apr 2025 16:00)  T(F): 98 (14 Apr 2025 04:00), Max: 98.4 (13 Apr 2025 16:00)  HR: 83 (14 Apr 2025 07:00) (66 - 91)  BP: 107/62 (14 Apr 2025 07:00) (81/46 - 120/57)  BP(mean): 81 (14 Apr 2025 07:00) (65 - 82)  RR: 17 (14 Apr 2025 07:00) (11 - 45)  SpO2: 100% (14 Apr 2025 07:00) (92% - 100%)    Parameters below as of 13 Apr 2025 20:00  Patient On (Oxygen Delivery Method): room air        PHYSICAL EXAM:  GENERAL: NAD, lying in bed comfortably  HEENT: NC/AT  CHEST/LUNG: CTAB, no increased WOB  HEART: RRR, no m/r/g  ABDOMEN: soft, NT, ND, BS+  NERVOUS SYSTEM:  A&Ox3    LABS:                        11.0   9.61  )-----------( 337      ( 13 Apr 2025 22:36 )             34.3     04-13    137  |  103  |  11  ----------------------------<  84  4.3   |  22  |  1.06    Ca    8.5      13 Apr 2025 22:36  Phos  3.0     04-13  Mg     2.1     04-13    TPro  6.8  /  Alb  3.8  /  TBili  0.8  /  DBili  x   /  AST  11  /  ALT  7[L]  /  AlkPhos  62  04-13    PT/INR - ( 13 Apr 2025 22:36 )   PT: 13.7 sec;   INR: 1.19 ratio         PTT - ( 13 Apr 2025 22:36 )  PTT:39.6 sec      Urinalysis Basic - ( 13 Apr 2025 22:36 )    Color: x / Appearance: x / SG: x / pH: x  Gluc: 84 mg/dL / Ketone: x  / Bili: x / Urobili: x   Blood: x / Protein: x / Nitrite: x   Leuk Esterase: x / RBC: x / WBC x   Sq Epi: x / Non Sq Epi: x / Bacteria: x        Culture - Blood (collected 11 Apr 2025 16:30)  Source: Blood Blood  Preliminary Report (13 Apr 2025 22:01):    No growth at 48 Hours    Culture - Blood (collected 11 Apr 2025 16:25)  Source: Blood Blood  Preliminary Report (13 Apr 2025 22:01):    No growth at 48 Hours    Assessment and Plan:    Neurologic:  -AAOx3    #migraines  -Headache cocktail per neuro: "-First line: recommend migraine medications (to be given all together at the same time if no contraindications form comorbitities): ketorolac (Toradol) 30mg IV q8h PRN (or acetaminophen 1g IV q8h PRN), metoclopramide (Reglan) 10mg q8h PRN, diphenhydramine (Benadryl) 25mg IV q8h PRN. Please repeat at least 2-3 cycles for medications to be effective."  -Currently managed with oxycodone PRN    #peripheral vertigo  -meclizine 12.5 mg PRN     Cardiovascular:  #Hypotension:  -Hold bumetanide, spironolactone, sildenafil, and nifedipine.  -midodrine 20 mg TID.  -Heart failure signing off as repeat TTE similar to prior     #Right Heart Failure:  -Hold bumetanide, spironolactone, and nifedipine.  -Heart failure signing off as repeat TTE similar to prior   - s/p TTE  -MYRIAM for pulmonic valve eval pending      #Pulmonary Hypertension:  -Continue home Remodulin subQ 51 ng/kg/min.  -midline in place by IR on 4/11  -Check BNP  - s/p TTE  -MYRIAM for pulmonic valve eval pending  - Transplant Pulmonology following    #Chronic PE  - Xarelto dc'ed in anticipation potential I&D  - Xarelto restarted on 4/14 post surgical aspiration of LLQ collection    Pulmonary:  #Pulmonary Hypertension:  -Continue home Remodulin subQ 51 ng/kg/min.  -midline in place by IR on 4/11  -Check BNP  - s/p TTE  -MYRIAM for pulmonic valve eval pending  - Transplant Pulmonology following    #Asthma  Continue home ipratropium (Atrovent) nebulizer PRN.  Continue home budesonide/formoterol (Pulmicort), tiotropium (Spiriva), and PRN nebulizers.  Continue fluticasone (Flonase).  Maintain supplemental O2 2L NC, keep SpO2 > 92%, avoid hyperoxia.    #Chronic PE:  - Xarelto dc'ed in anticipation potential I&D  - Xarelto restarted on 4/14 post surgical aspiration of LLQ collection      #Lung Transplant Evaluation  Ongoing pre-lung transplant workup.  Transplant Pulm requesting LLQ abscess I&D for medical optimization for lung transplant candidacy   Seen by pulmonary transplant service.  Patient is being considered for inpatient transplant workup per Dr. Wise.  Needs cardiac MRI (requires transition to MRI-compatible pump—pulmonary transplant team working on this).  cardiac MRI on 4/14 @ 01:00 PM   -s/p cardiac MRI  - MRCP on 4/15  - f/u MYRIAM  - f/u CT colongraphy for further workup for iron deficiency anemia     /Renal:  -no active issues  -replete electrolytes as needed     GI:  #Abdominal Abscess  -linezolid 600 mg PO q12h for 10-14 days until 4/16 was dc'ed by ID and switched to daptomycin 6 mg/kg until 4/19  -Per general surgery, no acute surgical intervention at this time for abscess.   -Transplant Pulm requesting LLQ abscess I&D for medical optimization for lung transplant candidacy   -s/p LLQ collection aspiration on 4/13    #yeast infection  -diflucan 150 mg given    #constipation  -Continue Miralax BID and senna    Hematology/Onc:  #Iron Deficiency Anemia (JULIA)  -outpatient HIE note from 03/21/25 with dx of microcytic anemia (iron deficiency vs hemoglobinopathy)  - venofer x 3 doses to end on 4/17  - f/u Hb electrophoresis   - f/u CT colonography     ID:  #Abdominal Abscess  linezolid 600 mg PO q12h for 10-14 days until 4/16 was dc'ed by ID and switched to daptomycin 6 mg/kg until 4/19  f/u ID recommendations.    #penicillin allergy   -per allergy note on 4/10 from inpatient team was recommending a graded challenge, appears to not have occurred as of yet     - administer 10% of standard amoxicillin dose, then observe for 30 minutes     - if tolerated, administer remaining 90% of standard amoxicillin dose, then observe for 60 minutes  - if patient does not develop immediate reactions then is considered to be not allergic and penicillin allergy can be delabeled  -will trial on 4/16 ^

## 2025-04-15 NOTE — PROGRESS NOTE ADULT - ASSESSMENT
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 This hospitalization she has had hypotension to low 80s (starting midodrine 4/9)  Neurology consulted 4/9/25 for headaches and vertigo.  -Headaches similar to prior but more frequent and more severe. +throbbing +photophobia . Responds to oxycodone / hydromorphone but not acetaminophen  -Vertigo is positional and paroxysmal. Responded well to meclizine 12.5mg x 1  -Neuro exam with left corrective saccade on right head impulse, only end gaze nystagmus, and no skew   She has chronic headaches for last 2 years. Headaches were holocephalic, perhaps worse posteriorly, throbb  CTH (4/7/25) unrevealing  s/p LLQ collection aspiration     Impression:  1) Migraine without aura, worse in setting of acute medical illness; imporved   2) Peripheral vertigo; better    Recommendations  #Migraine without aura  Baseline 1 episode per week, now daily and more intense while hospitalized responsive to opioids here, less so to acetaminophen   -Consider non-opioid treatment of head pain  -First line: recommend migraine medications (to be given all together at the same time if no contraindications form comorbitities): ketorolac (Toradol) 30mg IV q8h PRN (or acetaminophen 1g IV q8h PRN), metoclopramide (Reglan) 10mg q8h PRN, diphenhydramine (Benadryl) 25mg IV q8h PRN. Please repeat at least 2-3 cycles for medications to be effective.  -IV hydration, Mg 2g IV x1    #Peripheral Vertigo  -Physical therapy  -Vestibular Rehab Referral  -ENT Referral  -[email or web address] for patient education  -Check orthostatic vitals   -IV Fluids as safe/able (after orthostatic vitals) consider bolus and then maintenance fluids if no contraindications  -Meclizine PRN, she responded well to 1x dose of 12.5mg    -remaining per primary team/ICU   Triston Alexandre MD  Vascular Neurology  Office: [phone] .

## 2025-04-15 NOTE — PROCEDURE NOTE - ADDITIONAL PROCEDURE DETAILS
Pre MRI reprogramming    Estimated remaining battery longevity: ~ 8.5 years   <1%; AP <1%    MRI Protection mode turned ON. Pacing turned OFF.     - Meri Bentley PA-C Indication: Pre MRI reprogramming    Estimated remaining battery longevity: ~ 8.5 years   <1%; AP <1%    MRI Protection mode turned ON. Pacing turned OFF.     - Meri Bentley PA-C

## 2025-04-15 NOTE — PROGRESS NOTE ADULT - ATTENDING COMMENTS
1. Pulmonary HTN. Continue Remodulin SQ. Will remove for 1-2 hours so pt can get MRI abd..   2.ID. Continue Daptomycin for superficial abd abscesses. Drained yesterday.  3.Asthma: Continue inhaled steroids and bronchodilators.  4. Migraine headaches. Continue headache regimen prn  5. Lung transplant work up. For ABD MRI today. Will need MYRIAM to evaluate PV valve in light of pulmonary aneurysm  6.DVT prophylaxis: Continue Xarelto  7. H/O  PE.  Continue Xarelto  8. GOC. Full code

## 2025-04-15 NOTE — PROGRESS NOTE ADULT - SUBJECTIVE AND OBJECTIVE BOX
INFECTIOUS DISEASES FOLLOW UP-- Aleja Jauregui  [phone]    This is a follow up note for this  44yFemale with  Cellulitis        ROS:  CONSTITUTIONAL:  No fever, good appetite  CARDIOVASCULAR:  No chest pain or palpitations  RESPIRATORY:  No dyspnea  GASTROINTESTINAL:  No nausea, vomiting, diarrhea, or abdominal pain  GENITOURINARY:  No dysuria  NEUROLOGIC:  No headache,     Allergies    vancomycin (Rash)  penicillin (Rash)  adhesives (Rash)    Intolerances        ANTIBIOTICS/RELEVANT:  antimicrobials  DAPTOmycin IVPB      DAPTOmycin IVPB 600 milliGRAM(s) IV Intermittent every 24 hours    immunologic:  influenza   Vaccine 0.5 milliLiter(s) IntraMuscular once    OTHER:  acetaminophen     Tablet .. 1000 milliGRAM(s) Oral every 6 hours PRN  albuterol/ipratropium for Nebulization 3 milliLiter(s) Nebulizer every 6 hours PRN  chlorhexidine 2% Cloths 1 Application(s) Topical <User Schedule>  diphenhydrAMINE Injectable 25 milliGRAM(s) IV Push every 8 hours PRN  fluticasone propionate 50 MICROgram(s)/spray Nasal Spray 1 Spray(s) Both Nostrils two times a day  iron sucrose IVPB 300 milliGRAM(s) IV Intermittent once  ketorolac   Injectable 30 milliGRAM(s) IV Push every 8 hours PRN  LORazepam   Injectable 2 milliGRAM(s) IV Push once PRN  LORazepam   Injectable 2 milliGRAM(s) IV Push once PRN  meclizine 12.5 milliGRAM(s) Oral once PRN  melatonin 9 milliGRAM(s) Oral at bedtime PRN  metoclopramide 10 milliGRAM(s) Oral every 8 hours PRN  midodrine. 20 milliGRAM(s) Oral three times a day  montelukast 10 milliGRAM(s) Oral daily  pantoprazole    Tablet 40 milliGRAM(s) Oral before breakfast  polyethylene glycol 3350 17 Gram(s) Oral two times a day  remodulin (treprostinil) SubQ infusion 1 Dose(s) 1 Dose(s) SubCutaneous Continuous Pump  rivaroxaban 15 milliGRAM(s) Oral with dinner  senna 2 Tablet(s) Oral at bedtime      Objective:  Vital Signs Last 24 Hrs  T(C): 36.4 (15 Apr 2025 12:00), Max: 37.1 (15 Apr 2025 04:00)  T(F): 97.5 (15 Apr 2025 12:00), Max: 98.7 (15 Apr 2025 04:00)  HR: 87 (15 Apr 2025 15:00) (73 - 93)  BP: 129/57 (15 Apr 2025 15:00) (81/46 - 144/86)  BP(mean): 82 (15 Apr 2025 15:00) (65 - 110)  RR: 37 (15 Apr 2025 15:00) (13 - 49)  SpO2: 100% (15 Apr 2025 14:00) (97% - 100%)    Parameters below as of 15 Apr 2025 12:00  Patient On (Oxygen Delivery Method): room air        PHYSICAL EXAM:  Constitutional:no acute distress  Eyes:DEMETRIUS, EOMI  Ear/Nose/Throat: no oral lesions, 	  Respiratory: clear BL  Cardiovascular: S1S2  Gastrointestinal:soft, (+) BS, no tenderness  Extremities:no e/e/c  No Lymphadenopathy  IV sites not inflammed.    LABS:                        10.0   10.69 )-----------( 273      ( 15 Apr 2025 00:25 )             31.8     04-15    136  |  103  |  9   ----------------------------<  77  4.0   |  20[L]  |  1.00    Ca    8.2[L]      15 Apr 2025 00:25  Phos  2.6     04-15  Mg     2.1     04-15    TPro  6.1  /  Alb  3.5  /  TBili  0.6  /  DBili  x   /  AST  11  /  ALT  7[L]  /  AlkPhos  53  04-15    PT/INR - ( 15 Apr 2025 00:25 )   PT: 25.0 sec;   INR: 2.21 ratio         PTT - ( 15 Apr 2025 00:25 )  PTT:51.3 sec  Urinalysis Basic - ( 15 Apr 2025 00:25 )    Color: x / Appearance: x / SG: x / pH: x  Gluc: 77 mg/dL / Ketone: x  / Bili: x / Urobili: x   Blood: x / Protein: x / Nitrite: x   Leuk Esterase: x / RBC: x / WBC x   Sq Epi: x / Non Sq Epi: x / Bacteria: x        MICROBIOLOGY:            RECENT CULTURES:  04-13 @ 14:21  Exudate Incision  --  --  --    Rare Pseudomonas aeruginosa  --  04-11 @ 16:30  Blood Blood  --  --  --    No growth at 72 Hours  --  04-11 @ 16:25  Blood Blood  --  --  --    No growth at 72 Hours  --      RADIOLOGY & ADDITIONAL STUDIES:    < from: MR Cardiac w/wo IV Cont (04.14.25 @ 15:41) >  IMPRESSION:    MAIN PULMONARY ARTERY: Severely enlarged pulmonary trunk measuring 5.2 cm   with central flow artifact on black blood images, reflecting known   pulmonary arterial hypertension. Bilateral main pulmonary arteries are   also significantly dilated and likely aneurysmal in the left, better seen   in prior chest CT from 10/30/2024. Phase contrast was not performed.    LEFT VENTRICLE: Normal in size based on LVEDVI. Systolic function is   globally normal, 65% ejection fraction. Septal flattening/bounce is   likely related to elevated right heart pressures. No clear evidence of   myocardial edema, though T2 evaluation is limited by artifact. Delayed   enhancement is noted along inferior artery insertional site, nonischemic.    RIGHT VENTRICLE: Mildly enlarged based on RVEDVI. Systolic function is   globally mildly depressed, 42% ejection fraction. There is right   ventricular hypertrophy. Abnormal septal motion as described above.   Delayed signal noted at the RVOT/pulmonary arterial tree.    VALVES: Flow acceleration across the pulmonic valve with prominent   pulmonic valve regurgitant jet.    NON-CARDIAC: Trace right pleural effusion. Lung parenchyma is   incompletely characterized on MRI. Correlate with dedicated chest CT as   clinically warranted.    --- End of Report ---    < end of copied text >   INFECTIOUS DISEASES FOLLOW UP-- Aleja Jauregui  [phone]    This is a follow up note for this  44yFemale with  Cellulitis of the abdominal wall    s/p MRCP today    abdominal drainage with MRSA and pseuidomonas        ROS:  CONSTITUTIONAL:  No fever, good appetite  CARDIOVASCULAR:  No chest pain or palpitations  RESPIRATORY:  No dyspnea  GASTROINTESTINAL:  No nausea, vomiting, diarrhea, or abdominal pain  GENITOURINARY:  No dysuria  NEUROLOGIC:  No headache,     Allergies    vancomycin (Rash)  penicillin (Rash)  adhesives (Rash)    Intolerances        ANTIBIOTICS/RELEVANT:  antimicrobials  DAPTOmycin IVPB      DAPTOmycin IVPB 600 milliGRAM(s) IV Intermittent every 24 hours    immunologic:  influenza   Vaccine 0.5 milliLiter(s) IntraMuscular once    OTHER:  acetaminophen     Tablet .. 1000 milliGRAM(s) Oral every 6 hours PRN  albuterol/ipratropium for Nebulization 3 milliLiter(s) Nebulizer every 6 hours PRN  chlorhexidine 2% Cloths 1 Application(s) Topical <User Schedule>  diphenhydrAMINE Injectable 25 milliGRAM(s) IV Push every 8 hours PRN  fluticasone propionate 50 MICROgram(s)/spray Nasal Spray 1 Spray(s) Both Nostrils two times a day  iron sucrose IVPB 300 milliGRAM(s) IV Intermittent once  ketorolac   Injectable 30 milliGRAM(s) IV Push every 8 hours PRN  LORazepam   Injectable 2 milliGRAM(s) IV Push once PRN  LORazepam   Injectable 2 milliGRAM(s) IV Push once PRN  meclizine 12.5 milliGRAM(s) Oral once PRN  melatonin 9 milliGRAM(s) Oral at bedtime PRN  metoclopramide 10 milliGRAM(s) Oral every 8 hours PRN  midodrine. 20 milliGRAM(s) Oral three times a day  montelukast 10 milliGRAM(s) Oral daily  pantoprazole    Tablet 40 milliGRAM(s) Oral before breakfast  polyethylene glycol 3350 17 Gram(s) Oral two times a day  remodulin (treprostinil) SubQ infusion 1 Dose(s) 1 Dose(s) SubCutaneous Continuous Pump  rivaroxaban 15 milliGRAM(s) Oral with dinner  senna 2 Tablet(s) Oral at bedtime      Objective:  Vital Signs Last 24 Hrs  T(C): 36.4 (15 Apr 2025 12:00), Max: 37.1 (15 Apr 2025 04:00)  T(F): 97.5 (15 Apr 2025 12:00), Max: 98.7 (15 Apr 2025 04:00)  HR: 87 (15 Apr 2025 15:00) (73 - 93)  BP: 129/57 (15 Apr 2025 15:00) (81/46 - 144/86)  BP(mean): 82 (15 Apr 2025 15:00) (65 - 110)  RR: 37 (15 Apr 2025 15:00) (13 - 49)  SpO2: 100% (15 Apr 2025 14:00) (97% - 100%)    Parameters below as of 15 Apr 2025 12:00  Patient On (Oxygen Delivery Method): room air        PHYSICAL EXAM:  Constitutional:no acute distress  Eyes:DEMETRIUS, EOMI  Ear/Nose/Throat: no oral lesions, 	  Respiratory: clear BL  Cardiovascular: S1S2  Gastrointestinal:soft, (+) BS, no tenderness two area of I and D either side of umbilicus  Extremities:no e/e/c  No Lymphadenopathy  IV sites not inflammed.    LABS:                        10.0   10.69 )-----------( 273      ( 15 Apr 2025 00:25 )             31.8     04-15    136  |  103  |  9   ----------------------------<  77  4.0   |  20[L]  |  1.00    Ca    8.2[L]      15 Apr 2025 00:25  Phos  2.6     04-15  Mg     2.1     04-15    TPro  6.1  /  Alb  3.5  /  TBili  0.6  /  DBili  x   /  AST  11  /  ALT  7[L]  /  AlkPhos  53  04-15    PT/INR - ( 15 Apr 2025 00:25 )   PT: 25.0 sec;   INR: 2.21 ratio         PTT - ( 15 Apr 2025 00:25 )  PTT:51.3 sec  Urinalysis Basic - ( 15 Apr 2025 00:25 )    Color: x / Appearance: x / SG: x / pH: x  Gluc: 77 mg/dL / Ketone: x  / Bili: x / Urobili: x   Blood: x / Protein: x / Nitrite: x   Leuk Esterase: x / RBC: x / WBC x   Sq Epi: x / Non Sq Epi: x / Bacteria: x        MICROBIOLOGY:            RECENT CULTURES:  04-13 @ 14:21  Exudate Incision  --  --  --    Rare Pseudomonas aeruginosa  --  04-11 @ 16:30  Blood Blood  --  --  --    No growth at 72 Hours  --  04-11 @ 16:25  Blood Blood  --  --  --    No growth at 72 Hours  --      RADIOLOGY & ADDITIONAL STUDIES:    < from: MR Cardiac w/wo IV Cont (04.14.25 @ 15:41) >  IMPRESSION:    MAIN PULMONARY ARTERY: Severely enlarged pulmonary trunk measuring 5.2 cm   with central flow artifact on black blood images, reflecting known   pulmonary arterial hypertension. Bilateral main pulmonary arteries are   also significantly dilated and likely aneurysmal in the left, better seen   in prior chest CT from 10/30/2024. Phase contrast was not performed.    LEFT VENTRICLE: Normal in size based on LVEDVI. Systolic function is   globally normal, 65% ejection fraction. Septal flattening/bounce is   likely related to elevated right heart pressures. No clear evidence of   myocardial edema, though T2 evaluation is limited by artifact. Delayed   enhancement is noted along inferior artery insertional site, nonischemic.    RIGHT VENTRICLE: Mildly enlarged based on RVEDVI. Systolic function is   globally mildly depressed, 42% ejection fraction. There is right   ventricular hypertrophy. Abnormal septal motion as described above.   Delayed signal noted at the RVOT/pulmonary arterial tree.    VALVES: Flow acceleration across the pulmonic valve with prominent   pulmonic valve regurgitant jet.    NON-CARDIAC: Trace right pleural effusion. Lung parenchyma is   incompletely characterized on MRI. Correlate with dedicated chest CT as   clinically warranted.    --- End of Report ---    < end of copied text >

## 2025-04-15 NOTE — DIETITIAN INITIAL EVALUATION ADULT - ENERGY INTAKE
Pt states she is trying to eat but appetite remains decreased. Amenable to oral nutrition supplements.  Fair (50-75%)

## 2025-04-15 NOTE — PROCEDURE NOTE - ADDITIONAL PROCEDURE DETAILS
Post MRI reprogramming    Estimated remaining battery longevity: ~ 8.5 years    MRI Protection mode turned OFF. Mode turned back to DDD 60-130bpm.     - Meri Bentley PA-C Indication: Post MRI reprogramming    Estimated remaining battery longevity: ~ 8.5 years    MRI Protection mode turned OFF. Mode turned back to DDD 60-130bpm.     - Meri Bentley PA-C

## 2025-04-15 NOTE — PROGRESS NOTE ADULT - NS ATTEND AMEND GEN_ALL_CORE FT
Seen and examined patient with NP/PA/ Fellow physician.  Independently verified the review of systems, physical examination, labs, radiological imaging. Also discussed with consultants to formulate eventual assessment and plan.      Assessment and plan:  - Undergoing lung transplant evaluation  - Discussed with thoracic surgery, cardiac MRI today.  Reviewed.  Request for MYRIAM to look at integrity of pulmonary valve given pulmonary regurgitation and severely dilated PA.   - Needs PRA  - Allergy consult requested   - HemOnc consult         Mckay Fritz MD, MPH   Lung Transplantation  , Pulmonary and Critical care Medicine  [facility]

## 2025-04-15 NOTE — DIETITIAN INITIAL EVALUATION ADULT - REASON INDICATOR FOR ASSESSMENT
Assessment warranted for length of stay. Note pt under evaluation for lung transplant.   Information obtained from pt, medical record.

## 2025-04-15 NOTE — PHARMACOTHERAPY INTERVENTION NOTE - COMMENTS
ROXI MARIA, 44y Female with hx of MRSA abdominal wall abscesses 2/25 sent to ED on 4/2/25 from MD office for abdominal abscess. Imaging on 4/2 showed 5.0 x 2.1 x 4.0 cm collection consistent with plegmon/abscess. I&D from 4/3 growing MRSA started on daptomycin. 4/13 I&D cultures growing Pseudomonas aeruginosa pending susceptibilities.     Recommendation(s):  - Start cefepime 2g q8h pending final susceptibilities     Ronald Sharp, PharmD  PGY-2 ID Pharmacy Resident  Available on [employer] Teams

## 2025-04-15 NOTE — PROCEDURE NOTE - NSINTMANUFACTURE_CARD_ALL_CORE
[facility]

## 2025-04-15 NOTE — PROGRESS NOTE ADULT - SUBJECTIVE AND OBJECTIVE BOX
Neurology Progress Note    S: Patient seen and examined.   in ICU. no HA       Medications: MEDICATIONS  (STANDING):  cefepime   IVPB 2000 milliGRAM(s) IV Intermittent once  cefepime   IVPB      chlorhexidine 2% Cloths 1 Application(s) Topical <User Schedule>  DAPTOmycin IVPB      DAPTOmycin IVPB 600 milliGRAM(s) IV Intermittent every 24 hours  fluticasone propionate 50 MICROgram(s)/spray Nasal Spray 1 Spray(s) Both Nostrils two times a day  influenza   Vaccine 0.5 milliLiter(s) IntraMuscular once  midodrine. 20 milliGRAM(s) Oral three times a day  montelukast 10 milliGRAM(s) Oral daily  pantoprazole    Tablet 40 milliGRAM(s) Oral before breakfast  polyethylene glycol 3350 17 Gram(s) Oral two times a day  remodulin (treprostinil) SubQ infusion 1 Dose(s) 1 Dose(s) SubCutaneous Continuous Pump  rivaroxaban 15 milliGRAM(s) Oral with dinner  senna 2 Tablet(s) Oral at bedtime    MEDICATIONS  (PRN):  acetaminophen     Tablet .. 1000 milliGRAM(s) Oral every 6 hours PRN Mild Pain (1 - 3), Moderate Pain (4 - 6)  albuterol/ipratropium for Nebulization 3 milliLiter(s) Nebulizer every 6 hours PRN Shortness of Breath  diphenhydrAMINE Injectable 25 milliGRAM(s) IV Push every 8 hours PRN Migraine  ketorolac   Injectable 30 milliGRAM(s) IV Push every 8 hours PRN Migraine  meclizine 12.5 milliGRAM(s) Oral once PRN Dizziness  melatonin 9 milliGRAM(s) Oral at bedtime PRN Insomnia  metoclopramide 10 milliGRAM(s) Oral every 8 hours PRN Migraine       Vitals:  Vital Signs Last 24 Hrs  T(C): 36.7 (15 Apr 2025 17:00), Max: 37.1 (15 Apr 2025 04:00)  T(F): 98.1 (15 Apr 2025 17:00), Max: 98.7 (15 Apr 2025 04:00)  HR: 97 (15 Apr 2025 17:00) (73 - 97)  BP: 109/56 (15 Apr 2025 17:00) (81/46 - 144/86)  BP(mean): 77 (15 Apr 2025 17:00) (65 - 110)  RR: 25 (15 Apr 2025 17:00) (13 - 49)  SpO2: 100% (15 Apr 2025 17:00) (97% - 100%)    Parameters below as of 15 Apr 2025 17:00  Patient On (Oxygen Delivery Method): room air          General Exam:   General Appearance: Appropriately dressed and in no acute distress       Head: Normocephalic, atraumatic and no dysmorphic features  Ear, Nose, and Throat: Moist mucous membranes  CVS: S1S2+  Resp: No SOB, no wheeze or rhonchi  Abd: soft NTND  Extremities: No edema, no cyanosis  Skin: No bruises, no rashes     Neurological Exam:    level of consciousness: Awake, alert  Orientation: Oriented to person, age, place, and time  Language: Speech is fluent with intact naming, repetition, and ability to follow commands (including simple commands and complex cross commands)  Cortical Signs:  no hemineglect.   Content: Good overall fund of knowledge (aware of current events,and relevant past history)    HINTS+; improved      Hearing intact and  symmetric to finger rub b/l as above    - Cranial Nerves:   PERRL, VFF, EOMI, facial sensation is intact in the V1-V3 distribution bilaterally; face is symmetric with augusto smile; hearing is intact to finger rub bilaterally and equally; uvula is midline and soft palate rises symmetrically; shoulder shrug intact; tongue protrudes in the midline with intact lateral movements    - Motor Testing:  Bulk: Normal   Tone: Normal  Strength:  There is no pronator drift b/l  There is no leg drift b/l  NO orbiting  intact rapid finger rapping                              Right           Left  Should-Abduct      5                   5   -some pain limitations  Elbow-Flex             5                   5  Elbow-Ext              5                   5                        5                   5    Hip-Flex                 5                   5  Knee-Flex              5                   5  Knee-Ext                5                   5  Dorsiflex                5                   5  Plantarflex             5                   5    - Reflexes:              Right           Left  Triceps                     2+                2+  Biceps                      2+                 2+  Brachioradialis         2+                2+  Patellar                    2+                 2+    requires distraction to elicit all reflexes    - Sensory: Intact throughout to light touch.   - Coordination: Finger-nose-finger intact without dysmetria.   ROmberg negative   - Gait: Normal steps, base, arm swing, and turning.  Able to tandem walk. Able to heel and toe walk.    I personally reviewed the below data/images/labs:        LABS:                          10.0   10.69 )-----------( 273      ( 15 Apr 2025 00:25 )             31.8     04-15    136  |  103  |  9   ----------------------------<  77  4.0   |  20[L]  |  1.00    Ca    8.2[L]      15 Apr 2025 00:25  Phos  2.6     04-15  Mg     2.1     04-15    TPro  6.1  /  Alb  3.5  /  TBili  0.6  /  DBili  x   /  AST  11  /  ALT  7[L]  /  AlkPhos  53  04-15    LIVER FUNCTIONS - ( 15 Apr 2025 00:25 )  Alb: 3.5 g/dL / Pro: 6.1 g/dL / ALK PHOS: 53 U/L / ALT: 7 U/L / AST: 11 U/L / GGT: x           PT/INR - ( 15 Apr 2025 00:25 )   PT: 25.0 sec;   INR: 2.21 ratio         PTT - ( 15 Apr 2025 00:25 )  PTT:51.3 sec  Urinalysis Basic - ( 15 Apr 2025 00:25 )    Color: x / Appearance: x / SG: x / pH: x  Gluc: 77 mg/dL / Ketone: x  / Bili: x / Urobili: x   Blood: x / Protein: x / Nitrite: x   Leuk Esterase: x / RBC: x / WBC x   Sq Epi: x / Non Sq Epi: x / Bacteria: x          < from: CT Head No Cont (04.07.25 @ 10:22) >    ACC: [id number] EXAM:  CT BRAIN   ORDERED BY:  SERINA BOWEN     PROCEDURE DATE:  04/07/2025          INTERPRETATION:  CLINICAL INFORMATION: headaches    COMPARISON: CT head 10/23/2024    CONTRAST:  IV Contrast: NONE  .    TECHNIQUE:  Serial axial images were obtained from the skull base to the   vertex using multi-slice helical technique. Sagittal and coronal   reformats were obtained.    FINDINGS:    VENTRICLES AND SULCI: Normal in size and configuration.  INTRA-AXIAL: No mass effect, acute hemorrhage, or midline shift.  EXTRA-AXIAL: No mass or fluid collection. Basal cisterns are normal in   appearance.    VISUALIZED SINUSES:  Clear.  TYMPANOMASTOID CAVITIES:  Clear.  VISUALIZED ORBITS: Normal.  CALVARIUM: Intact.    MISCELLANEOUS: None.      IMPRESSION:  No acute intracranial hemorrhage, mass effect, or midline shift.        --- End of Report ---            AAKASH SIMON MD; Attending Radiologist  This document has been electronically signed. Apr 7 2025 11:05AM    < end of copied text >

## 2025-04-15 NOTE — PROCEDURE NOTE - INTERROGATION NOTE: COMMENTS
Battery longevity: 8.5 years remaining

## 2025-04-15 NOTE — PROCEDURE NOTE - NSPROCNAME_GEN_A_CORE
PPM Interrogation Note
Incision & Drainage
PPM Interrogation Note
Midline Insertion
Incision & Drainage

## 2025-04-15 NOTE — DIETITIAN INITIAL EVALUATION ADULT - PERTINENT MEDS FT
MEDICATIONS  (STANDING):  chlorhexidine 2% Cloths 1 Application(s) Topical <User Schedule>  DAPTOmycin IVPB      DAPTOmycin IVPB 600 milliGRAM(s) IV Intermittent every 24 hours  fluticasone propionate 50 MICROgram(s)/spray Nasal Spray 1 Spray(s) Both Nostrils two times a day  influenza   Vaccine 0.5 milliLiter(s) IntraMuscular once  midodrine. 20 milliGRAM(s) Oral three times a day  montelukast 10 milliGRAM(s) Oral daily  pantoprazole    Tablet 40 milliGRAM(s) Oral before breakfast  polyethylene glycol 3350 17 Gram(s) Oral two times a day  remodulin (treprostinil) SubQ infusion 1 Dose(s) 1 Dose(s) SubCutaneous Continuous Pump  rivaroxaban 15 milliGRAM(s) Oral with dinner  senna 2 Tablet(s) Oral at bedtime    MEDICATIONS  (PRN):  acetaminophen     Tablet .. 1000 milliGRAM(s) Oral every 6 hours PRN Mild Pain (1 - 3), Moderate Pain (4 - 6)  albuterol/ipratropium for Nebulization 3 milliLiter(s) Nebulizer every 6 hours PRN Shortness of Breath  diphenhydrAMINE Injectable 25 milliGRAM(s) IV Push every 8 hours PRN Migraine  ketorolac   Injectable 30 milliGRAM(s) IV Push every 8 hours PRN Migraine  meclizine 12.5 milliGRAM(s) Oral once PRN Dizziness  melatonin 9 milliGRAM(s) Oral at bedtime PRN Insomnia  metoclopramide 10 milliGRAM(s) Oral every 8 hours PRN Migraine   Health Care Proxy (HCP)

## 2025-04-15 NOTE — PROGRESS NOTE ADULT - ASSESSMENT
44 year old female with history of chronic hypoxic respiratory failure, Group 1 PAH on treprostinil infusion, HF s/p PPM dual chamber (2016), chronic PE (2017) on DOAC, SVT s/p ablation (5/2020), asthma, and MRSA abdominal wall abscesses (last 2/25). Was previously admitted to [facility] 10/13/24-10/29/24 for pre-transplant expedited workup. Sent to ED 4/2/25 for worsening abdominal abscess.    Pre-lung transplant:  - Ongoing work up  - Cardiac MRI done, MYRIAM pending  - MRCP pending  - Anemia/heme work up requested [ ]  - PCN allergy - requested allergy consult [ ]

## 2025-04-16 ENCOUNTER — RESULT REVIEW (OUTPATIENT)
Age: 45
End: 2025-04-16

## 2025-04-16 LAB
ALBUMIN SERPL ELPH-MCNC: 3.5 G/DL — SIGNIFICANT CHANGE UP (ref 3.3–5)
ALP SERPL-CCNC: 57 U/L — SIGNIFICANT CHANGE UP (ref 40–120)
ALT FLD-CCNC: 6 U/L — LOW (ref 10–45)
ANION GAP SERPL CALC-SCNC: 12 MMOL/L — SIGNIFICANT CHANGE UP (ref 5–17)
APTT BLD: 53.8 SEC — HIGH (ref 24.5–35.6)
AST SERPL-CCNC: 12 U/L — SIGNIFICANT CHANGE UP (ref 10–40)
BILIRUB SERPL-MCNC: 0.5 MG/DL — SIGNIFICANT CHANGE UP (ref 0.2–1.2)
BUN SERPL-MCNC: 8 MG/DL — SIGNIFICANT CHANGE UP (ref 7–23)
CALCIUM SERPL-MCNC: 8.6 MG/DL — SIGNIFICANT CHANGE UP (ref 8.4–10.5)
CHLORIDE SERPL-SCNC: 101 MMOL/L — SIGNIFICANT CHANGE UP (ref 96–108)
CK SERPL-CCNC: 94 U/L — SIGNIFICANT CHANGE UP (ref 25–170)
CO2 SERPL-SCNC: 19 MMOL/L — LOW (ref 22–31)
CREAT SERPL-MCNC: 0.98 MG/DL — SIGNIFICANT CHANGE UP (ref 0.5–1.3)
CULTURE RESULTS: SIGNIFICANT CHANGE UP
CULTURE RESULTS: SIGNIFICANT CHANGE UP
EGFR: 73 ML/MIN/1.73M2 — SIGNIFICANT CHANGE UP
EGFR: 73 ML/MIN/1.73M2 — SIGNIFICANT CHANGE UP
GAS PNL BLDV: SIGNIFICANT CHANGE UP
GLUCOSE SERPL-MCNC: 78 MG/DL — SIGNIFICANT CHANGE UP (ref 70–99)
HCT VFR BLD CALC: 32.4 % — LOW (ref 34.5–45)
HEMOGLOBIN INTERPRETATION: SIGNIFICANT CHANGE UP
HGB A MFR BLD: 97.6 % — SIGNIFICANT CHANGE UP (ref 95.8–98)
HGB A2 MFR BLD: 2.4 % — SIGNIFICANT CHANGE UP (ref 2–3.2)
HGB BLD-MCNC: 10 G/DL — LOW (ref 11.5–15.5)
INR BLD: 2.43 RATIO — HIGH (ref 0.85–1.16)
MAGNESIUM SERPL-MCNC: 1.9 MG/DL — SIGNIFICANT CHANGE UP (ref 1.6–2.6)
MCHC RBC-ENTMCNC: 22.2 PG — LOW (ref 27–34)
MCHC RBC-ENTMCNC: 30.9 G/DL — LOW (ref 32–36)
MCV RBC AUTO: 71.8 FL — LOW (ref 80–100)
NRBC BLD AUTO-RTO: 0 /100 WBCS — SIGNIFICANT CHANGE UP (ref 0–0)
PHOSPHATE SERPL-MCNC: 3 MG/DL — SIGNIFICANT CHANGE UP (ref 2.5–4.5)
PLATELET # BLD AUTO: 263 K/UL — SIGNIFICANT CHANGE UP (ref 150–400)
POTASSIUM SERPL-MCNC: 3.9 MMOL/L — SIGNIFICANT CHANGE UP (ref 3.5–5.3)
POTASSIUM SERPL-SCNC: 3.9 MMOL/L — SIGNIFICANT CHANGE UP (ref 3.5–5.3)
PROT SERPL-MCNC: 6.2 G/DL — SIGNIFICANT CHANGE UP (ref 6–8.3)
PROTHROM AB SERPL-ACNC: 27.7 SEC — HIGH (ref 9.9–13.4)
RBC # BLD: 4.51 M/UL — SIGNIFICANT CHANGE UP (ref 3.8–5.2)
RBC # FLD: 16.2 % — HIGH (ref 10.3–14.5)
SODIUM SERPL-SCNC: 132 MMOL/L — LOW (ref 135–145)
SPECIMEN SOURCE: SIGNIFICANT CHANGE UP
SPECIMEN SOURCE: SIGNIFICANT CHANGE UP
WBC # BLD: 9.57 K/UL — SIGNIFICANT CHANGE UP (ref 3.8–10.5)
WBC # FLD AUTO: 9.57 K/UL — SIGNIFICANT CHANGE UP (ref 3.8–10.5)

## 2025-04-16 PROCEDURE — 93325 DOPPLER ECHO COLOR FLOW MAPG: CPT | Mod: 26

## 2025-04-16 PROCEDURE — 99233 SBSQ HOSP IP/OBS HIGH 50: CPT | Mod: GC

## 2025-04-16 PROCEDURE — 93312 ECHO TRANSESOPHAGEAL: CPT | Mod: 26

## 2025-04-16 PROCEDURE — 93320 DOPPLER ECHO COMPLETE: CPT | Mod: 26

## 2025-04-16 RX ORDER — DIPHENHYDRAMINE HCL 12.5MG/5ML
50 ELIXIR ORAL ONCE
Refills: 0 | Status: COMPLETED | OUTPATIENT
Start: 2025-04-16 | End: 2025-04-16

## 2025-04-16 RX ORDER — DIPHENHYDRAMINE HCL 12.5MG/5ML
25 ELIXIR ORAL ONCE
Refills: 0 | Status: COMPLETED | OUTPATIENT
Start: 2025-04-16 | End: 2025-04-16

## 2025-04-16 RX ORDER — DIPHENHYDRAMINE HCL 12.5MG/5ML
50 ELIXIR ORAL EVERY 8 HOURS
Refills: 0 | Status: DISCONTINUED | OUTPATIENT
Start: 2025-04-16 | End: 2025-04-19

## 2025-04-16 RX ORDER — DIPHENHYDRAMINE HCL 12.5MG/5ML
50 ELIXIR ORAL EVERY 8 HOURS
Refills: 0 | Status: DISCONTINUED | OUTPATIENT
Start: 2025-04-16 | End: 2025-04-16

## 2025-04-16 RX ORDER — DIPHENHYDRAMINE HCL 12.5MG/5ML
25 ELIXIR ORAL ONCE
Refills: 0 | Status: DISCONTINUED | OUTPATIENT
Start: 2025-04-16 | End: 2025-04-16

## 2025-04-16 RX ADMIN — DAPTOMYCIN 124 MILLIGRAM(S): 500 INJECTION, POWDER, LYOPHILIZED, FOR SOLUTION INTRAVENOUS at 12:04

## 2025-04-16 RX ADMIN — MIDODRINE HYDROCHLORIDE 20 MILLIGRAM(S): 5 TABLET ORAL at 16:40

## 2025-04-16 RX ADMIN — Medication 10 MILLIGRAM(S): at 01:28

## 2025-04-16 RX ADMIN — Medication 9 MILLIGRAM(S): at 19:34

## 2025-04-16 RX ADMIN — RIVAROXABAN 15 MILLIGRAM(S): 10 TABLET, FILM COATED ORAL at 16:40

## 2025-04-16 RX ADMIN — CEFEPIME 100 MILLIGRAM(S): 2 INJECTION, POWDER, FOR SOLUTION INTRAVENOUS at 22:43

## 2025-04-16 RX ADMIN — KETOROLAC TROMETHAMINE 30 MILLIGRAM(S): 30 INJECTION, SOLUTION INTRAMUSCULAR; INTRAVENOUS at 09:14

## 2025-04-16 RX ADMIN — Medication 25 MILLIGRAM(S): at 11:01

## 2025-04-16 RX ADMIN — Medication 25 MILLIGRAM(S): at 01:28

## 2025-04-16 RX ADMIN — Medication 25 MILLIGRAM(S): at 01:37

## 2025-04-16 RX ADMIN — MIDODRINE HYDROCHLORIDE 20 MILLIGRAM(S): 5 TABLET ORAL at 05:34

## 2025-04-16 RX ADMIN — Medication 40 MILLIGRAM(S): at 05:40

## 2025-04-16 RX ADMIN — Medication 1 APPLICATION(S): at 05:40

## 2025-04-16 RX ADMIN — KETOROLAC TROMETHAMINE 30 MILLIGRAM(S): 30 INJECTION, SOLUTION INTRAMUSCULAR; INTRAVENOUS at 01:28

## 2025-04-16 RX ADMIN — Medication 25 MILLIGRAM(S): at 09:14

## 2025-04-16 RX ADMIN — KETOROLAC TROMETHAMINE 30 MILLIGRAM(S): 30 INJECTION, SOLUTION INTRAMUSCULAR; INTRAVENOUS at 09:30

## 2025-04-16 RX ADMIN — KETOROLAC TROMETHAMINE 30 MILLIGRAM(S): 30 INJECTION, SOLUTION INTRAMUSCULAR; INTRAVENOUS at 19:34

## 2025-04-16 RX ADMIN — Medication 50 MILLIGRAM(S): at 19:33

## 2025-04-16 RX ADMIN — FLUTICASONE PROPIONATE 1 SPRAY(S): 50 SPRAY, METERED NASAL at 05:37

## 2025-04-16 RX ADMIN — Medication 10 MILLIGRAM(S): at 19:33

## 2025-04-16 RX ADMIN — KETOROLAC TROMETHAMINE 30 MILLIGRAM(S): 30 INJECTION, SOLUTION INTRAMUSCULAR; INTRAVENOUS at 02:00

## 2025-04-16 RX ADMIN — KETOROLAC TROMETHAMINE 30 MILLIGRAM(S): 30 INJECTION, SOLUTION INTRAMUSCULAR; INTRAVENOUS at 20:00

## 2025-04-16 RX ADMIN — Medication 50 MILLIGRAM(S): at 23:02

## 2025-04-16 RX ADMIN — CEFEPIME 100 MILLIGRAM(S): 2 INJECTION, POWDER, FOR SOLUTION INTRAVENOUS at 16:18

## 2025-04-16 RX ADMIN — CEFEPIME 100 MILLIGRAM(S): 2 INJECTION, POWDER, FOR SOLUTION INTRAVENOUS at 05:35

## 2025-04-16 NOTE — PROGRESS NOTE ADULT - ASSESSMENT
************************  Amirah Rowe DO   Emergency Medicine PGY-1  ************************    Patient is a 44y old  Female who presents with a chief complaint of Abdominal abscess (13 Apr 2025 07:19)    Overnight no acute events    MEDICATIONS  (STANDING):  buDESOnide    Inhalation Suspension 0.5 milliGRAM(s) Inhalation daily  chlorhexidine 2% Cloths 1 Application(s) Topical <User Schedule>  DAPTOmycin IVPB      DAPTOmycin IVPB 600 milliGRAM(s) IV Intermittent every 24 hours  fluticasone propionate 50 MICROgram(s)/spray Nasal Spray 1 Spray(s) Both Nostrils two times a day  heparin   Injectable 5000 Unit(s) SubCutaneous every 8 hours  influenza   Vaccine 0.5 milliLiter(s) IntraMuscular once  midodrine. 20 milliGRAM(s) Oral three times a day  montelukast 10 milliGRAM(s) Oral daily  pantoprazole    Tablet 40 milliGRAM(s) Oral before breakfast  polyethylene glycol 3350 17 Gram(s) Oral two times a day  remodulin (treprostinil) SubQ infusion 1 Dose(s) 1 Dose(s) SubCutaneous Continuous Pump  senna 2 Tablet(s) Oral at bedtime    MEDICATIONS  (PRN):  acetaminophen     Tablet .. 1000 milliGRAM(s) Oral every 6 hours PRN Mild Pain (1 - 3), Moderate Pain (4 - 6)  albuterol/ipratropium for Nebulization 3 milliLiter(s) Nebulizer every 6 hours PRN Shortness of Breath  diphenhydrAMINE Injectable 25 milliGRAM(s) IV Push every 8 hours PRN Migraine  ketorolac   Injectable 30 milliGRAM(s) IV Push every 8 hours PRN Migraine  meclizine 12.5 milliGRAM(s) Oral once PRN Dizziness  melatonin 9 milliGRAM(s) Oral at bedtime PRN Insomnia  metoclopramide 10 milliGRAM(s) Oral every 8 hours PRN Migraine  oxyCODONE    IR 2.5 milliGRAM(s) Oral every 6 hours PRN Severe Pain (7 - 10)      CAPILLARY BLOOD GLUCOSE        I&O's Summary    13 Apr 2025 07:01  -  14 Apr 2025 07:00  --------------------------------------------------------  IN: 650 mL / OUT: 400 mL / NET: 250 mL        PHYSICAL EXAM:  Vital Signs Last 24 Hrs  T(C): 36.7 (14 Apr 2025 04:00), Max: 36.9 (13 Apr 2025 16:00)  T(F): 98 (14 Apr 2025 04:00), Max: 98.4 (13 Apr 2025 16:00)  HR: 83 (14 Apr 2025 07:00) (66 - 91)  BP: 107/62 (14 Apr 2025 07:00) (81/46 - 120/57)  BP(mean): 81 (14 Apr 2025 07:00) (65 - 82)  RR: 17 (14 Apr 2025 07:00) (11 - 45)  SpO2: 100% (14 Apr 2025 07:00) (92% - 100%)    Parameters below as of 13 Apr 2025 20:00  Patient On (Oxygen Delivery Method): room air        PHYSICAL EXAM:  GENERAL: NAD, lying in bed comfortably  HEENT: NC/AT  CHEST/LUNG: CTAB, no increased WOB  HEART: RRR, no m/r/g  ABDOMEN: soft, NT, ND, BS+  NERVOUS SYSTEM:  A&Ox3    LABS:                        11.0   9.61  )-----------( 337      ( 13 Apr 2025 22:36 )             34.3     04-13    137  |  103  |  11  ----------------------------<  84  4.3   |  22  |  1.06    Ca    8.5      13 Apr 2025 22:36  Phos  3.0     04-13  Mg     2.1     04-13    TPro  6.8  /  Alb  3.8  /  TBili  0.8  /  DBili  x   /  AST  11  /  ALT  7[L]  /  AlkPhos  62  04-13    PT/INR - ( 13 Apr 2025 22:36 )   PT: 13.7 sec;   INR: 1.19 ratio         PTT - ( 13 Apr 2025 22:36 )  PTT:39.6 sec      Urinalysis Basic - ( 13 Apr 2025 22:36 )    Color: x / Appearance: x / SG: x / pH: x  Gluc: 84 mg/dL / Ketone: x  / Bili: x / Urobili: x   Blood: x / Protein: x / Nitrite: x   Leuk Esterase: x / RBC: x / WBC x   Sq Epi: x / Non Sq Epi: x / Bacteria: x        Culture - Blood (collected 11 Apr 2025 16:30)  Source: Blood Blood  Preliminary Report (13 Apr 2025 22:01):    No growth at 48 Hours    Culture - Blood (collected 11 Apr 2025 16:25)  Source: Blood Blood  Preliminary Report (13 Apr 2025 22:01):    No growth at 48 Hours    Assessment and Plan:    Neurologic:  -AAOx3    #migraines  -Headache cocktail per neuro: "-First line: recommend migraine medications (to be given all together at the same time if no contraindications form comorbitities): ketorolac (Toradol) 30mg IV q8h PRN (or acetaminophen 1g IV q8h PRN), metoclopramide (Reglan) 10mg q8h PRN, diphenhydramine (Benadryl) 25mg IV q8h PRN. Please repeat at least 2-3 cycles for medications to be effective."  -Currently managed with oxycodone PRN    #peripheral vertigo  -meclizine 12.5 mg PRN     Cardiovascular:  #Hypotension:  -Hold bumetanide, spironolactone, sildenafil, and nifedipine.  -midodrine 20 mg TID.  -Heart failure signing off as repeat TTE similar to prior     #Right Heart Failure:  -Hold bumetanide, spironolactone, and nifedipine.  -Heart failure signing off as repeat TTE similar to prior   - s/p TTE  -MYRIAM for pulmonic valve eval pending      #Pulmonary Hypertension:  -Continue home Remodulin subQ 51 ng/kg/min.  -midline in place by IR on 4/11  -Check BNP  - s/p TTE  -MYRIAM for pulmonic valve eval pending  - Transplant Pulmonology following    #Chronic PE  - Xarelto dc'ed in anticipation potential I&D  - Xarelto restarted on 4/14 post surgical aspiration of LLQ collection    Pulmonary:  #Pulmonary Hypertension:  -Continue home Remodulin subQ 51 ng/kg/min.  -midline in place by IR on 4/11  -Check BNP  - s/p TTE  -MYRIAM for pulmonic valve eval pending  - Transplant Pulmonology following    #Asthma  Continue home ipratropium (Atrovent) nebulizer PRN.  Continue home budesonide/formoterol (Pulmicort), tiotropium (Spiriva), and PRN nebulizers.  Continue fluticasone (Flonase).  Maintain supplemental O2 2L NC, keep SpO2 > 92%, avoid hyperoxia.    #Chronic PE:  - Xarelto dc'ed in anticipation potential I&D  - Xarelto restarted on 4/14 post surgical aspiration of LLQ collection      #Lung Transplant Evaluation  Ongoing pre-lung transplant workup.  Transplant Pulm requesting LLQ abscess I&D for medical optimization for lung transplant candidacy   Seen by pulmonary transplant service.  Patient is being considered for inpatient transplant workup per Dr. Wise.  Needs cardiac MRI (requires transition to MRI-compatible pump—pulmonary transplant team working on this).  cardiac MRI on 4/14 @ 01:00 PM   -s/p cardiac MRI  - MRCP on 4/15  - f/u MYRIAM  - f/u CT colongraphy for further workup for iron deficiency anemia     /Renal:  -no active issues  -replete electrolytes as needed     GI:  #Abdominal Abscess  -linezolid 600 mg PO q12h for 10-14 days until 4/16 was dc'ed by ID and switched to daptomycin 6 mg/kg until 4/19  -Per general surgery, no acute surgical intervention at this time for abscess.   -Transplant Pulm requesting LLQ abscess I&D for medical optimization for lung transplant candidacy   -s/p LLQ collection aspiration on 4/13  -wound cultures grew pseudomonas -> switched to IV cefepime 4/16     #yeast infection  -diflucan 150 mg given    #constipation  -Continue Miralax BID and senna    Hematology/Onc:  #Iron Deficiency Anemia (JULIA)  -outpatient HIE note from 03/21/25 with dx of microcytic anemia (iron deficiency vs hemoglobinopathy)  - venofer x 3 doses to end on 4/17  - f/u Hb electrophoresis   - f/u CT colonography     ID:  #Abdominal Abscess  linezolid 600 mg PO q12h for 10-14 days until 4/16 was dc'ed by ID and switched to daptomycin 6 mg/kg until 4/19  wound cultures grew pseudomonas -> switched to IV cefepime 4/16     #penicillin allergy   -per allergy note on 4/10 from inpatient team was recommending a graded challenge, appears to not have occurred as of yet     - administer 10% of standard amoxicillin dose, then observe for 30 minutes     - if tolerated, administer remaining 90% of standard amoxicillin dose, then observe for 60 minutes  - if patient does not develop immediate reactions then is considered to be not allergic and penicillin allergy can be delabeled  -will trial on 4/16 ^     ************************  Amirah Rowe,    Emergency Medicine PGY-1  ************************    Patient is a 44y old  Female who presents with a chief complaint of Abdominal abscess (13 Apr 2025 07:19)    Overnight Benadryl given for pruritis 2/2 EKG lead stickers.     MEDICATIONS  (STANDING):  buDESOnide    Inhalation Suspension 0.5 milliGRAM(s) Inhalation daily  chlorhexidine 2% Cloths 1 Application(s) Topical <User Schedule>  DAPTOmycin IVPB      DAPTOmycin IVPB 600 milliGRAM(s) IV Intermittent every 24 hours  fluticasone propionate 50 MICROgram(s)/spray Nasal Spray 1 Spray(s) Both Nostrils two times a day  heparin   Injectable 5000 Unit(s) SubCutaneous every 8 hours  influenza   Vaccine 0.5 milliLiter(s) IntraMuscular once  midodrine. 20 milliGRAM(s) Oral three times a day  montelukast 10 milliGRAM(s) Oral daily  pantoprazole    Tablet 40 milliGRAM(s) Oral before breakfast  polyethylene glycol 3350 17 Gram(s) Oral two times a day  remodulin (treprostinil) SubQ infusion 1 Dose(s) 1 Dose(s) SubCutaneous Continuous Pump  senna 2 Tablet(s) Oral at bedtime    MEDICATIONS  (PRN):  acetaminophen     Tablet .. 1000 milliGRAM(s) Oral every 6 hours PRN Mild Pain (1 - 3), Moderate Pain (4 - 6)  albuterol/ipratropium for Nebulization 3 milliLiter(s) Nebulizer every 6 hours PRN Shortness of Breath  diphenhydrAMINE Injectable 25 milliGRAM(s) IV Push every 8 hours PRN Migraine  ketorolac   Injectable 30 milliGRAM(s) IV Push every 8 hours PRN Migraine  meclizine 12.5 milliGRAM(s) Oral once PRN Dizziness  melatonin 9 milliGRAM(s) Oral at bedtime PRN Insomnia  metoclopramide 10 milliGRAM(s) Oral every 8 hours PRN Migraine  oxyCODONE    IR 2.5 milliGRAM(s) Oral every 6 hours PRN Severe Pain (7 - 10)      CAPILLARY BLOOD GLUCOSE        I&O's Summary    13 Apr 2025 07:01  -  14 Apr 2025 07:00  --------------------------------------------------------  IN: 650 mL / OUT: 400 mL / NET: 250 mL        PHYSICAL EXAM:  Vital Signs Last 24 Hrs  T(C): 36.7 (14 Apr 2025 04:00), Max: 36.9 (13 Apr 2025 16:00)  T(F): 98 (14 Apr 2025 04:00), Max: 98.4 (13 Apr 2025 16:00)  HR: 83 (14 Apr 2025 07:00) (66 - 91)  BP: 107/62 (14 Apr 2025 07:00) (81/46 - 120/57)  BP(mean): 81 (14 Apr 2025 07:00) (65 - 82)  RR: 17 (14 Apr 2025 07:00) (11 - 45)  SpO2: 100% (14 Apr 2025 07:00) (92% - 100%)    Parameters below as of 13 Apr 2025 20:00  Patient On (Oxygen Delivery Method): room air        PHYSICAL EXAM:  GENERAL: NAD, lying in bed comfortably  HEENT: NC/AT  CHEST/LUNG: CTAB, no increased WOB  HEART: RRR, no m/r/g  ABDOMEN: soft, NT, ND, BS+  NERVOUS SYSTEM:  A&Ox3    LABS:                        11.0   9.61  )-----------( 337      ( 13 Apr 2025 22:36 )             34.3     04-13    137  |  103  |  11  ----------------------------<  84  4.3   |  22  |  1.06    Ca    8.5      13 Apr 2025 22:36  Phos  3.0     04-13  Mg     2.1     04-13    TPro  6.8  /  Alb  3.8  /  TBili  0.8  /  DBili  x   /  AST  11  /  ALT  7[L]  /  AlkPhos  62  04-13    PT/INR - ( 13 Apr 2025 22:36 )   PT: 13.7 sec;   INR: 1.19 ratio         PTT - ( 13 Apr 2025 22:36 )  PTT:39.6 sec      Urinalysis Basic - ( 13 Apr 2025 22:36 )    Color: x / Appearance: x / SG: x / pH: x  Gluc: 84 mg/dL / Ketone: x  / Bili: x / Urobili: x   Blood: x / Protein: x / Nitrite: x   Leuk Esterase: x / RBC: x / WBC x   Sq Epi: x / Non Sq Epi: x / Bacteria: x        Culture - Blood (collected 11 Apr 2025 16:30)  Source: Blood Blood  Preliminary Report (13 Apr 2025 22:01):    No growth at 48 Hours    Culture - Blood (collected 11 Apr 2025 16:25)  Source: Blood Blood  Preliminary Report (13 Apr 2025 22:01):    No growth at 48 Hours    Assessment and Plan:    Neurologic:  -AAOx3    #migraines  -Headache cocktail per neuro: "-First line: recommend migraine medications (to be given all together at the same time if no contraindications form comorbitities): ketorolac (Toradol) 30mg IV q8h PRN (or acetaminophen 1g IV q8h PRN), metoclopramide (Reglan) 10mg q8h PRN, diphenhydramine (Benadryl) 25mg IV q8h PRN. Please repeat at least 2-3 cycles for medications to be effective."  -Currently managed with oxycodone PRN    #peripheral vertigo  -meclizine 12.5 mg PRN     Cardiovascular:  #Hypotension:  -Hold bumetanide, spironolactone, sildenafil, and nifedipine.  -midodrine 20 mg TID.  -Heart failure signing off as repeat TTE similar to prior     #Right Heart Failure:  -Hold bumetanide, spironolactone, and nifedipine.  -Heart failure signing off as repeat TTE similar to prior   - s/p TTE  -MYRIAM on 4/16 for pulmonic valve eval pending      #Pulmonary Hypertension:  -Continue home Remodulin subQ 51 ng/kg/min.  -midline in place by IR on 4/11  -Check BNP  - s/p TTE  - MYRIAM on 4/16 for pulmonic valve eval pending  - Transplant Pulmonology following    #Chronic PE  - Xarelto dc'ed in anticipation potential I&D  - Xarelto restarted on 4/14 post surgical aspiration of LLQ collection    Pulmonary:  #Pulmonary Hypertension:  -Continue home Remodulin subQ 51 ng/kg/min.  -midline in place by IR on 4/11  -Check BNP  - s/p TTE  -MYRIAM on 4/16 for pulmonic valve eval pending  - Transplant Pulmonology following    #Asthma  Continue home ipratropium (Atrovent) nebulizer PRN.  Continue home budesonide/formoterol (Pulmicort), tiotropium (Spiriva), and PRN nebulizers.  Continue fluticasone (Flonase).  Maintain supplemental O2 2L NC, keep SpO2 > 92%, avoid hyperoxia.    #Chronic PE:  - Xarelto dc'ed in anticipation potential I&D  - Xarelto restarted on 4/14 post surgical aspiration of LLQ collection      #Lung Transplant Evaluation  Ongoing pre-lung transplant workup.  Transplant Pulm requesting LLQ abscess I&D for medical optimization for lung transplant candidacy   Seen by pulmonary transplant service.  Patient is being considered for inpatient transplant workup per Dr. Wise.  Needs cardiac MRI (requires transition to MRI-compatible pump—pulmonary transplant team working on this).  cardiac MRI on 4/14 @ 01:00 PM   -s/p cardiac MRI  - MRCP on 4/15  - MYRIAM  - f/u CT colongraphy for further workup for iron deficiency anemia     /Renal:  -no active issues  -replete electrolytes as needed     GI:  #Abdominal Abscess  -linezolid 600 mg PO q12h for 10-14 days until 4/16 was dc'ed by ID and switched to daptomycin 6 mg/kg until 4/19  -Per general surgery, no acute surgical intervention at this time for abscess.   -Transplant Pulm requesting LLQ abscess I&D for medical optimization for lung transplant candidacy   -s/p LLQ collection aspiration on 4/13  -wound cultures grew pseudomonas -> added IV cefepime 4/16     #yeast infection  -diflucan 150 mg given on 4/15    #constipation  -Continue Miralax BID and senna    Hematology/Onc:  #Iron Deficiency Anemia (JULIA)  -outpatient HIE note from 03/21/25 with dx of microcytic anemia (iron deficiency vs hemoglobinopathy)  - venofer x 3 doses to end on 4/17  - f/u Hb electrophoresis   - f/u CT colonography     ID:  #Abdominal Abscess  linezolid 600 mg PO q12h for 10-14 days until 4/16 was dc'ed by ID and switched to daptomycin 6 mg/kg until 4/19  wound cultures grew pseudomonas -> added IV cefepime 4/16     #penicillin allergy   -per allergy note on 4/10 from inpatient team was recommending a graded challenge, appears to not have occurred as of yet     - administer 10% of standard amoxicillin dose, then observe for 30 minutes     - if tolerated, administer remaining 90% of standard amoxicillin dose, then observe for 60 minutes  - if patient does not develop immediate reactions then is considered to be not allergic and penicillin allergy can be delabeled  -will trial on 4/17 ^     ************************  Amirah Rowe,    Emergency Medicine PGY-1  ************************    Patient is a 44y old  Female who presents with a chief complaint of Abdominal abscess (13 Apr 2025 07:19)    Overnight no acute events.     MEDICATIONS  (STANDING):  buDESOnide    Inhalation Suspension 0.5 milliGRAM(s) Inhalation daily  chlorhexidine 2% Cloths 1 Application(s) Topical <User Schedule>  DAPTOmycin IVPB      DAPTOmycin IVPB 600 milliGRAM(s) IV Intermittent every 24 hours  fluticasone propionate 50 MICROgram(s)/spray Nasal Spray 1 Spray(s) Both Nostrils two times a day  heparin   Injectable 5000 Unit(s) SubCutaneous every 8 hours  influenza   Vaccine 0.5 milliLiter(s) IntraMuscular once  midodrine. 20 milliGRAM(s) Oral three times a day  montelukast 10 milliGRAM(s) Oral daily  pantoprazole    Tablet 40 milliGRAM(s) Oral before breakfast  polyethylene glycol 3350 17 Gram(s) Oral two times a day  remodulin (treprostinil) SubQ infusion 1 Dose(s) 1 Dose(s) SubCutaneous Continuous Pump  senna 2 Tablet(s) Oral at bedtime    MEDICATIONS  (PRN):  acetaminophen     Tablet .. 1000 milliGRAM(s) Oral every 6 hours PRN Mild Pain (1 - 3), Moderate Pain (4 - 6)  albuterol/ipratropium for Nebulization 3 milliLiter(s) Nebulizer every 6 hours PRN Shortness of Breath  diphenhydrAMINE Injectable 25 milliGRAM(s) IV Push every 8 hours PRN Migraine  ketorolac   Injectable 30 milliGRAM(s) IV Push every 8 hours PRN Migraine  meclizine 12.5 milliGRAM(s) Oral once PRN Dizziness  melatonin 9 milliGRAM(s) Oral at bedtime PRN Insomnia  metoclopramide 10 milliGRAM(s) Oral every 8 hours PRN Migraine  oxyCODONE    IR 2.5 milliGRAM(s) Oral every 6 hours PRN Severe Pain (7 - 10)      CAPILLARY BLOOD GLUCOSE        I&O's Summary    13 Apr 2025 07:01  -  14 Apr 2025 07:00  --------------------------------------------------------  IN: 650 mL / OUT: 400 mL / NET: 250 mL        PHYSICAL EXAM:  Vital Signs Last 24 Hrs  T(C): 36.7 (14 Apr 2025 04:00), Max: 36.9 (13 Apr 2025 16:00)  T(F): 98 (14 Apr 2025 04:00), Max: 98.4 (13 Apr 2025 16:00)  HR: 83 (14 Apr 2025 07:00) (66 - 91)  BP: 107/62 (14 Apr 2025 07:00) (81/46 - 120/57)  BP(mean): 81 (14 Apr 2025 07:00) (65 - 82)  RR: 17 (14 Apr 2025 07:00) (11 - 45)  SpO2: 100% (14 Apr 2025 07:00) (92% - 100%)    Parameters below as of 13 Apr 2025 20:00  Patient On (Oxygen Delivery Method): room air        PHYSICAL EXAM:  GENERAL: NAD, lying in bed comfortably  HEENT: NC/AT  CHEST/LUNG: CTAB, no increased WOB  HEART: RRR, no m/r/g  ABDOMEN: soft, NT, ND, BS+  NERVOUS SYSTEM:  A&Ox3    LABS:                        11.0   9.61  )-----------( 337      ( 13 Apr 2025 22:36 )             34.3     04-13    137  |  103  |  11  ----------------------------<  84  4.3   |  22  |  1.06    Ca    8.5      13 Apr 2025 22:36  Phos  3.0     04-13  Mg     2.1     04-13    TPro  6.8  /  Alb  3.8  /  TBili  0.8  /  DBili  x   /  AST  11  /  ALT  7[L]  /  AlkPhos  62  04-13    PT/INR - ( 13 Apr 2025 22:36 )   PT: 13.7 sec;   INR: 1.19 ratio         PTT - ( 13 Apr 2025 22:36 )  PTT:39.6 sec      Urinalysis Basic - ( 13 Apr 2025 22:36 )    Color: x / Appearance: x / SG: x / pH: x  Gluc: 84 mg/dL / Ketone: x  / Bili: x / Urobili: x   Blood: x / Protein: x / Nitrite: x   Leuk Esterase: x / RBC: x / WBC x   Sq Epi: x / Non Sq Epi: x / Bacteria: x        Culture - Blood (collected 11 Apr 2025 16:30)  Source: Blood Blood  Preliminary Report (13 Apr 2025 22:01):    No growth at 48 Hours    Culture - Blood (collected 11 Apr 2025 16:25)  Source: Blood Blood  Preliminary Report (13 Apr 2025 22:01):    No growth at 48 Hours    Assessment and Plan:    Neurologic:  -AAOx3    #migraines  -Headache cocktail per neuro: "-First line: recommend migraine medications (to be given all together at the same time if no contraindications form comorbitities): ketorolac (Toradol) 30mg IV q8h PRN (or acetaminophen 1g IV q8h PRN), metoclopramide (Reglan) 10mg q8h PRN, diphenhydramine (Benadryl) 25mg IV q8h PRN. Please repeat at least 2-3 cycles for medications to be effective."  -Currently managed with oxycodone PRN    #peripheral vertigo  -meclizine 12.5 mg PRN     Cardiovascular:  #Hypotension:  -Hold bumetanide, spironolactone, sildenafil, and nifedipine.  -midodrine 20 mg TID.  -Heart failure signing off as repeat TTE similar to prior     #Right Heart Failure:  -Hold bumetanide, spironolactone, and nifedipine.  -Heart failure signing off as repeat TTE similar to prior   - s/p TTE  -MYRIAM on 4/16 for pulmonic valve eval pending      #Pulmonary Hypertension:  -Continue home Remodulin subQ 51 ng/kg/min.  -midline in place by IR on 4/11  -Check BNP  - s/p TTE  - MYRIAM on 4/16 for pulmonic valve eval pending  - Transplant Pulmonology following    #Chronic PE  - Xarelto dc'ed in anticipation potential I&D  - Xarelto restarted on 4/14 post surgical aspiration of LLQ collection    Pulmonary:  #Pulmonary Hypertension:  -Continue home Remodulin subQ 51 ng/kg/min.  -midline in place by IR on 4/11  -Check BNP  - s/p TTE  -MYRIAM on 4/16 for pulmonic valve eval pending  - Transplant Pulmonology following    #Asthma  Continue home ipratropium (Atrovent) nebulizer PRN.  Continue home budesonide/formoterol (Pulmicort), tiotropium (Spiriva), and PRN nebulizers.  Continue fluticasone (Flonase).  Maintain supplemental O2 2L NC, keep SpO2 > 92%, avoid hyperoxia.    #Chronic PE:  - Xarelto dc'ed in anticipation potential I&D  - Xarelto restarted on 4/14 post surgical aspiration of LLQ collection      #Lung Transplant Evaluation  Ongoing pre-lung transplant workup.  Transplant Pulm requesting LLQ abscess I&D for medical optimization for lung transplant candidacy   Seen by pulmonary transplant service.  Patient is being considered for inpatient transplant workup per Dr. Wise.  Needs cardiac MRI (requires transition to MRI-compatible pump—pulmonary transplant team working on this).  cardiac MRI on 4/14 @ 01:00 PM   -s/p cardiac MRI  - MRCP on 4/15  - MYRIAM  - CT colongraphy on 4/18 for further workup for iron deficiency anemia     /Renal:  -no active issues  -replete electrolytes as needed     GI:  #Abdominal Abscess  -linezolid 600 mg PO q12h for 10-14 days until 4/16 was dc'ed by ID and switched to daptomycin 6 mg/kg until 4/19  -Per general surgery, no acute surgical intervention at this time for abscess.   -Transplant Pulm requesting LLQ abscess I&D for medical optimization for lung transplant candidacy   -s/p LLQ collection aspiration on 4/13  -wound cultures grew pseudomonas -> added IV cefepime 4/16     #yeast infection  -diflucan 150 mg given on 4/15    #constipation  -Continue Miralax BID and senna    Hematology/Onc:  #Iron Deficiency Anemia (JULIA)  -outpatient HIE note from 03/21/25 with dx of microcytic anemia (iron deficiency vs hemoglobinopathy)  - venofer x 3 doses to end on 4/17  - f/u Hb electrophoresis   - CT colongraphy on 4/18 for further workup for iron deficiency anemia     ID:  #Abdominal Abscess  linezolid 600 mg PO q12h for 10-14 days until 4/16 was dc'ed by ID and switched to daptomycin 6 mg/kg until 4/19  wound cultures grew pseudomonas -> added IV cefepime 4/16     #penicillin allergy   -per allergy note on 4/10 from inpatient team was recommending a graded challenge, appears to not have occurred as of yet     - administer 10% of standard amoxicillin dose, then observe for 30 minutes     - if tolerated, administer remaining 90% of standard amoxicillin dose, then observe for 60 minutes  - if patient does not develop immediate reactions then is considered to be not allergic and penicillin allergy can be delabeled  -will trial after conclusion of imaging^

## 2025-04-16 NOTE — PROGRESS NOTE ADULT - ASSESSMENT
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 This hospitalization she has had hypotension to low 80s (starting midodrine 4/9)  Neurology consulted 4/9/25 for headaches and vertigo.  -Headaches similar to prior but more frequent and more severe. +throbbing +photophobia . Responds to oxycodone / hydromorphone but not acetaminophen  -Vertigo is positional and paroxysmal. Responded well to meclizine 12.5mg x 1  -Neuro exam with left corrective saccade on right head impulse, only end gaze nystagmus, and no skew   She has chronic headaches for last 2 years. Headaches were holocephalic, perhaps worse posteriorly, throbb  CTH (4/7/25) unrevealing  s/p LLQ collection aspiration     Impression:  1) Migraine without aura, worse in setting of acute medical illness; imporved   2) Peripheral vertigo; better    Recommendations  #Migraine without aura  Baseline 1 episode per week, now daily and more intense while hospitalized responsive to opioids here, less so to acetaminophen   -Consider non-opioid treatment of head pain  -First line: recommend migraine medications (to be given all together at the same time if no contraindications form comorbitities): ketorolac (Toradol) 30mg IV q8h PRN (or acetaminophen 1g IV q8h PRN), metoclopramide (Reglan) 10mg q8h PRN, diphenhydramine (Benadryl) 25mg IV q8h PRN. Please repeat at least 2-3 cycles for medications to be effective.  -IV hydration, Mg 2g IV x1    #Peripheral Vertigo  -Physical therapy  -Vestibular Rehab Referral  -ENT Referral  -[email or web address] for patient education  -Check orthostatic vitals   -IV Fluids as safe/able (after orthostatic vitals) consider bolus and then maintenance fluids if no contraindications  -Meclizine PRN, she responded well to 1x dose of 12.5mg    -remaining per primary team/ICU   - lung transplant evaluation in progress   Triston Alexandre MD  Vascular Neurology  Office: [phone] .

## 2025-04-16 NOTE — PROGRESS NOTE ADULT - SUBJECTIVE AND OBJECTIVE BOX
Neurology Progress Note    S: Patient seen and examined.   in ICU. no HA         Medications: MEDICATIONS  (STANDING):  cefepime   IVPB 2000 milliGRAM(s) IV Intermittent every 8 hours  cefepime   IVPB      chlorhexidine 2% Cloths 1 Application(s) Topical <User Schedule>  DAPTOmycin IVPB      DAPTOmycin IVPB 600 milliGRAM(s) IV Intermittent every 24 hours  fluticasone propionate 50 MICROgram(s)/spray Nasal Spray 1 Spray(s) Both Nostrils two times a day  influenza   Vaccine 0.5 milliLiter(s) IntraMuscular once  midodrine. 20 milliGRAM(s) Oral three times a day  montelukast 10 milliGRAM(s) Oral daily  pantoprazole    Tablet 40 milliGRAM(s) Oral before breakfast  polyethylene glycol 3350 17 Gram(s) Oral two times a day  remodulin (treprostinil) SubQ infusion 1 Dose(s) 1 Dose(s) SubCutaneous Continuous Pump  rivaroxaban 15 milliGRAM(s) Oral with dinner  senna 2 Tablet(s) Oral at bedtime    MEDICATIONS  (PRN):  acetaminophen     Tablet .. 1000 milliGRAM(s) Oral every 6 hours PRN Mild Pain (1 - 3), Moderate Pain (4 - 6)  albuterol/ipratropium for Nebulization 3 milliLiter(s) Nebulizer every 6 hours PRN Shortness of Breath  diphenhydrAMINE Injectable 25 milliGRAM(s) IV Push every 8 hours PRN Migraine  ketorolac   Injectable 30 milliGRAM(s) IV Push every 8 hours PRN Migraine  meclizine 12.5 milliGRAM(s) Oral once PRN Dizziness  melatonin 9 milliGRAM(s) Oral at bedtime PRN Insomnia  metoclopramide 10 milliGRAM(s) Oral every 8 hours PRN Migraine       Vitals:  Vital Signs Last 24 Hrs  T(C): 36.8 (16 Apr 2025 08:00), Max: 36.8 (16 Apr 2025 00:00)  T(F): 98.2 (16 Apr 2025 08:00), Max: 98.3 (16 Apr 2025 00:00)  HR: 79 (16 Apr 2025 09:00) (74 - 103)  BP: 129/77 (16 Apr 2025 09:00) (84/50 - 146/86)  BP(mean): 97 (16 Apr 2025 09:00) (65 - 110)  RR: 23 (16 Apr 2025 09:00) (14 - 49)  SpO2: 100% (16 Apr 2025 09:00) (96% - 100%)    Parameters below as of 16 Apr 2025 08:00  Patient On (Oxygen Delivery Method): nasal cannula  O2 Flow (L/min): 2              General Exam:   General Appearance: Appropriately dressed and in no acute distress       Head: Normocephalic, atraumatic and no dysmorphic features  Ear, Nose, and Throat: Moist mucous membranes  CVS: S1S2+  Resp: No SOB, no wheeze or rhonchi  Abd: soft NTND  Extremities: No edema, no cyanosis  Skin: No bruises, no rashes     Neurological Exam:    level of consciousness: Awake, alert  Orientation: Oriented to person, age, place, and time  Language: Speech is fluent with intact naming, repetition, and ability to follow commands (including simple commands and complex cross commands)  Cortical Signs:  no hemineglect.   Content: Good overall fund of knowledge (aware of current events,and relevant past history)    HINTS+; improved      Hearing intact and  symmetric to finger rub b/l as above    - Cranial Nerves:   PERRL, VFF, EOMI, facial sensation is intact in the V1-V3 distribution bilaterally; face is symmetric with augusto smile; hearing is intact to finger rub bilaterally and equally; uvula is midline and soft palate rises symmetrically; shoulder shrug intact; tongue protrudes in the midline with intact lateral movements    - Motor Testing:  Bulk: Normal   Tone: Normal  Strength:  There is no pronator drift b/l  There is no leg drift b/l  NO orbiting  intact rapid finger rapping                              Right           Left  Should-Abduct      5                   5   -some pain limitations  Elbow-Flex             5                   5  Elbow-Ext              5                   5                        5                   5    Hip-Flex                 5                   5  Knee-Flex              5                   5  Knee-Ext                5                   5  Dorsiflex                5                   5  Plantarflex             5                   5    - Reflexes:              Right           Left  Triceps                     2+                2+  Biceps                      2+                 2+  Brachioradialis         2+                2+  Patellar                    2+                 2+    requires distraction to elicit all reflexes    - Sensory: Intact throughout to light touch.   - Coordination: Finger-nose-finger intact without dysmetria.   ROmberg negative   - Gait: Normal steps, base, arm swing, and turning.  Able to tandem walk. Able to heel and toe walk.    I personally reviewed the below data/images/labs:     LABS:                          10.0   9.57  )-----------( 263      ( 16 Apr 2025 00:41 )             32.4     04-16    132[L]  |  101  |  8   ----------------------------<  78  3.9   |  19[L]  |  0.98    Ca    8.6      16 Apr 2025 00:41  Phos  3.0     04-16  Mg     1.9     04-16    TPro  6.2  /  Alb  3.5  /  TBili  0.5  /  DBili  x   /  AST  12  /  ALT  6[L]  /  AlkPhos  57  04-16    LIVER FUNCTIONS - ( 16 Apr 2025 00:41 )  Alb: 3.5 g/dL / Pro: 6.2 g/dL / ALK PHOS: 57 U/L / ALT: 6 U/L / AST: 12 U/L / GGT: x           PT/INR - ( 16 Apr 2025 00:41 )   PT: 27.7 sec;   INR: 2.43 ratio         PTT - ( 16 Apr 2025 00:41 )  PTT:53.8 sec  Urinalysis Basic - ( 16 Apr 2025 00:41 )    Color: x / Appearance: x / SG: x / pH: x  Gluc: 78 mg/dL / Ketone: x  / Bili: x / Urobili: x   Blood: x / Protein: x / Nitrite: x   Leuk Esterase: x / RBC: x / WBC x   Sq Epi: x / Non Sq Epi: x / Bacteria: x            < from: CT Head No Cont (04.07.25 @ 10:22) >    ACC: [id number] EXAM:  CT BRAIN   ORDERED BY:  SERINA BOWEN     PROCEDURE DATE:  04/07/2025          INTERPRETATION:  CLINICAL INFORMATION: headaches    COMPARISON: CT head 10/23/2024    CONTRAST:  IV Contrast: NONE  .    TECHNIQUE:  Serial axial images were obtained from the skull base to the   vertex using multi-slice helical technique. Sagittal and coronal   reformats were obtained.    FINDINGS:    VENTRICLES AND SULCI: Normal in size and configuration.  INTRA-AXIAL: No mass effect, acute hemorrhage, or midline shift.  EXTRA-AXIAL: No mass or fluid collection. Basal cisterns are normal in   appearance.    VISUALIZED SINUSES:  Clear.  TYMPANOMASTOID CAVITIES:  Clear.  VISUALIZED ORBITS: Normal.  CALVARIUM: Intact.    MISCELLANEOUS: None.      IMPRESSION:  No acute intracranial hemorrhage, mass effect, or midline shift.        --- End of Report ---            AAKASH SIMON MD; Attending Radiologist  This document has been electronically signed. Apr 7 2025 11:05AM    < end of copied text >

## 2025-04-16 NOTE — PROGRESS NOTE ADULT - ATTENDING COMMENTS
1. Pulmonary HTN. Continue Remodulin SQ. Will remove for 1-2 hours so pt can get MRI abd..   2.ID. Continue Daptomycin for superficial abd abscesses. Drained yesterday.  3.Asthma: Continue inhaled steroids and bronchodilators.  4. Migraine headaches. Continue headache regimen prn  5. Lung transplant work up. For ABD MRI today. Will need MYRIAM to evaluate PV valve in light of pulmonary aneurysm  6.DVT prophylaxis: Continue Xarelto  7. H/O  PE.  Continue Xarelto  8. GOC. Full code 1. Pulmonary HTN. Continue Remodulin SQ. Will remove for 1-2 hours so pt can get MRI abd..   2.ID. Continue Daptomycin for superficial abd abscesses. Drained yesterday.  3.Asthma: Continue inhaled steroids and bronchodilators.  4. Migraine headaches. Continue headache regimen prn  5. Lung transplant work up. For ABD MRI today. Will need MYRIAM to evaluate PV valve in light of pulmonary aneurysm  6.DVT prophylaxis: Continue Xarelto  7. H/O  PE.  Continue Xarelto  8. GOC. Full code.

## 2025-04-17 LAB
ALBUMIN SERPL ELPH-MCNC: 3.7 G/DL — SIGNIFICANT CHANGE UP (ref 3.3–5)
ALBUMIN SERPL ELPH-MCNC: 3.7 G/DL — SIGNIFICANT CHANGE UP (ref 3.3–5)
ALP SERPL-CCNC: 53 U/L — SIGNIFICANT CHANGE UP (ref 40–120)
ALP SERPL-CCNC: 57 U/L — SIGNIFICANT CHANGE UP (ref 40–120)
ALT FLD-CCNC: 6 U/L — LOW (ref 10–45)
ALT FLD-CCNC: 7 U/L — LOW (ref 10–45)
ANION GAP SERPL CALC-SCNC: 14 MMOL/L — SIGNIFICANT CHANGE UP (ref 5–17)
ANION GAP SERPL CALC-SCNC: 14 MMOL/L — SIGNIFICANT CHANGE UP (ref 5–17)
APTT BLD: 48 SEC — HIGH (ref 24.5–35.6)
APTT BLD: 48.2 SEC — HIGH (ref 24.5–35.6)
AST SERPL-CCNC: 11 U/L — SIGNIFICANT CHANGE UP (ref 10–40)
AST SERPL-CCNC: 14 U/L — SIGNIFICANT CHANGE UP (ref 10–40)
BILIRUB SERPL-MCNC: 0.5 MG/DL — SIGNIFICANT CHANGE UP (ref 0.2–1.2)
BILIRUB SERPL-MCNC: 0.6 MG/DL — SIGNIFICANT CHANGE UP (ref 0.2–1.2)
BLD GP AB SCN SERPL QL: NEGATIVE — SIGNIFICANT CHANGE UP
BUN SERPL-MCNC: 5 MG/DL — LOW (ref 7–23)
BUN SERPL-MCNC: 7 MG/DL — SIGNIFICANT CHANGE UP (ref 7–23)
CALCIUM SERPL-MCNC: 8.4 MG/DL — SIGNIFICANT CHANGE UP (ref 8.4–10.5)
CALCIUM SERPL-MCNC: 8.8 MG/DL — SIGNIFICANT CHANGE UP (ref 8.4–10.5)
CHLORIDE SERPL-SCNC: 105 MMOL/L — SIGNIFICANT CHANGE UP (ref 96–108)
CHLORIDE SERPL-SCNC: 106 MMOL/L — SIGNIFICANT CHANGE UP (ref 96–108)
CO2 SERPL-SCNC: 17 MMOL/L — LOW (ref 22–31)
CO2 SERPL-SCNC: 19 MMOL/L — LOW (ref 22–31)
CREAT SERPL-MCNC: 0.96 MG/DL — SIGNIFICANT CHANGE UP (ref 0.5–1.3)
CREAT SERPL-MCNC: 1.04 MG/DL — SIGNIFICANT CHANGE UP (ref 0.5–1.3)
EGFR: 68 ML/MIN/1.73M2 — SIGNIFICANT CHANGE UP
EGFR: 68 ML/MIN/1.73M2 — SIGNIFICANT CHANGE UP
EGFR: 75 ML/MIN/1.73M2 — SIGNIFICANT CHANGE UP
EGFR: 75 ML/MIN/1.73M2 — SIGNIFICANT CHANGE UP
GLUCOSE SERPL-MCNC: 77 MG/DL — SIGNIFICANT CHANGE UP (ref 70–99)
GLUCOSE SERPL-MCNC: 95 MG/DL — SIGNIFICANT CHANGE UP (ref 70–99)
HCT VFR BLD CALC: 31 % — LOW (ref 34.5–45)
HCT VFR BLD CALC: 32.2 % — LOW (ref 34.5–45)
HGB BLD-MCNC: 10 G/DL — LOW (ref 11.5–15.5)
HGB BLD-MCNC: 9.8 G/DL — LOW (ref 11.5–15.5)
INR BLD: 1.8 RATIO — HIGH (ref 0.85–1.16)
INR BLD: 2.15 RATIO — HIGH (ref 0.85–1.16)
MAGNESIUM SERPL-MCNC: 1.7 MG/DL — SIGNIFICANT CHANGE UP (ref 1.6–2.6)
MAGNESIUM SERPL-MCNC: 1.8 MG/DL — SIGNIFICANT CHANGE UP (ref 1.6–2.6)
MCHC RBC-ENTMCNC: 22 PG — LOW (ref 27–34)
MCHC RBC-ENTMCNC: 22.4 PG — LOW (ref 27–34)
MCHC RBC-ENTMCNC: 31.1 G/DL — LOW (ref 32–36)
MCHC RBC-ENTMCNC: 31.6 G/DL — LOW (ref 32–36)
MCV RBC AUTO: 70.8 FL — LOW (ref 80–100)
MCV RBC AUTO: 70.9 FL — LOW (ref 80–100)
NRBC BLD AUTO-RTO: 0 /100 WBCS — SIGNIFICANT CHANGE UP (ref 0–0)
NRBC BLD AUTO-RTO: 0 /100 WBCS — SIGNIFICANT CHANGE UP (ref 0–0)
PHOSPHATE SERPL-MCNC: 2.6 MG/DL — SIGNIFICANT CHANGE UP (ref 2.5–4.5)
PHOSPHATE SERPL-MCNC: 3.2 MG/DL — SIGNIFICANT CHANGE UP (ref 2.5–4.5)
PLATELET # BLD AUTO: 211 K/UL — SIGNIFICANT CHANGE UP (ref 150–400)
PLATELET # BLD AUTO: 242 K/UL — SIGNIFICANT CHANGE UP (ref 150–400)
POTASSIUM SERPL-MCNC: 3.7 MMOL/L — SIGNIFICANT CHANGE UP (ref 3.5–5.3)
POTASSIUM SERPL-MCNC: 4.2 MMOL/L — SIGNIFICANT CHANGE UP (ref 3.5–5.3)
POTASSIUM SERPL-SCNC: 3.7 MMOL/L — SIGNIFICANT CHANGE UP (ref 3.5–5.3)
POTASSIUM SERPL-SCNC: 4.2 MMOL/L — SIGNIFICANT CHANGE UP (ref 3.5–5.3)
PROT SERPL-MCNC: 6.5 G/DL — SIGNIFICANT CHANGE UP (ref 6–8.3)
PROT SERPL-MCNC: 6.6 G/DL — SIGNIFICANT CHANGE UP (ref 6–8.3)
PROTHROM AB SERPL-ACNC: 20.4 SEC — HIGH (ref 9.9–13.4)
PROTHROM AB SERPL-ACNC: 24.5 SEC — HIGH (ref 9.9–13.4)
RBC # BLD: 4.38 M/UL — SIGNIFICANT CHANGE UP (ref 3.8–5.2)
RBC # BLD: 4.54 M/UL — SIGNIFICANT CHANGE UP (ref 3.8–5.2)
RBC # FLD: 16.4 % — HIGH (ref 10.3–14.5)
RBC # FLD: 17 % — HIGH (ref 10.3–14.5)
RH IG SCN BLD-IMP: POSITIVE — SIGNIFICANT CHANGE UP
SODIUM SERPL-SCNC: 137 MMOL/L — SIGNIFICANT CHANGE UP (ref 135–145)
SODIUM SERPL-SCNC: 138 MMOL/L — SIGNIFICANT CHANGE UP (ref 135–145)
WBC # BLD: 6.3 K/UL — SIGNIFICANT CHANGE UP (ref 3.8–10.5)
WBC # BLD: 8.2 K/UL — SIGNIFICANT CHANGE UP (ref 3.8–10.5)
WBC # FLD AUTO: 6.3 K/UL — SIGNIFICANT CHANGE UP (ref 3.8–10.5)
WBC # FLD AUTO: 8.2 K/UL — SIGNIFICANT CHANGE UP (ref 3.8–10.5)

## 2025-04-17 PROCEDURE — 99232 SBSQ HOSP IP/OBS MODERATE 35: CPT

## 2025-04-17 PROCEDURE — 99232 SBSQ HOSP IP/OBS MODERATE 35: CPT | Mod: GC

## 2025-04-17 RX ORDER — MIDODRINE HYDROCHLORIDE 5 MG/1
10 TABLET ORAL THREE TIMES A DAY
Refills: 0 | Status: DISCONTINUED | OUTPATIENT
Start: 2025-04-17 | End: 2025-04-18

## 2025-04-17 RX ORDER — DIPHENHYDRAMINE HCL 12.5MG/5ML
25 ELIXIR ORAL ONCE
Refills: 0 | Status: COMPLETED | OUTPATIENT
Start: 2025-04-17 | End: 2025-04-17

## 2025-04-17 RX ORDER — MELATONIN 5 MG
9 TABLET ORAL ONCE
Refills: 0 | Status: COMPLETED | OUTPATIENT
Start: 2025-04-17 | End: 2025-04-17

## 2025-04-17 RX ADMIN — MIDODRINE HYDROCHLORIDE 20 MILLIGRAM(S): 5 TABLET ORAL at 05:15

## 2025-04-17 RX ADMIN — KETOROLAC TROMETHAMINE 30 MILLIGRAM(S): 30 INJECTION, SOLUTION INTRAMUSCULAR; INTRAVENOUS at 14:36

## 2025-04-17 RX ADMIN — Medication 25 MILLIGRAM(S): at 05:47

## 2025-04-17 RX ADMIN — Medication 9 MILLIGRAM(S): at 22:41

## 2025-04-17 RX ADMIN — Medication 9 MILLIGRAM(S): at 01:37

## 2025-04-17 RX ADMIN — RIVAROXABAN 15 MILLIGRAM(S): 10 TABLET, FILM COATED ORAL at 17:04

## 2025-04-17 RX ADMIN — CEFEPIME 100 MILLIGRAM(S): 2 INJECTION, POWDER, FOR SOLUTION INTRAVENOUS at 13:09

## 2025-04-17 RX ADMIN — MONTELUKAST SODIUM 10 MILLIGRAM(S): 10 TABLET ORAL at 11:25

## 2025-04-17 RX ADMIN — Medication 40 MILLIGRAM(S): at 07:16

## 2025-04-17 RX ADMIN — DAPTOMYCIN 124 MILLIGRAM(S): 500 INJECTION, POWDER, LYOPHILIZED, FOR SOLUTION INTRAVENOUS at 11:35

## 2025-04-17 RX ADMIN — KETOROLAC TROMETHAMINE 30 MILLIGRAM(S): 30 INJECTION, SOLUTION INTRAMUSCULAR; INTRAVENOUS at 16:27

## 2025-04-17 RX ADMIN — CEFEPIME 100 MILLIGRAM(S): 2 INJECTION, POWDER, FOR SOLUTION INTRAVENOUS at 21:02

## 2025-04-17 RX ADMIN — Medication 25 MILLIGRAM(S): at 10:10

## 2025-04-17 RX ADMIN — FLUTICASONE PROPIONATE 1 SPRAY(S): 50 SPRAY, METERED NASAL at 17:03

## 2025-04-17 RX ADMIN — KETOROLAC TROMETHAMINE 30 MILLIGRAM(S): 30 INJECTION, SOLUTION INTRAMUSCULAR; INTRAVENOUS at 23:00

## 2025-04-17 RX ADMIN — Medication 10 MILLIGRAM(S): at 05:47

## 2025-04-17 RX ADMIN — KETOROLAC TROMETHAMINE 30 MILLIGRAM(S): 30 INJECTION, SOLUTION INTRAMUSCULAR; INTRAVENOUS at 06:43

## 2025-04-17 RX ADMIN — Medication 10 MILLIGRAM(S): at 14:36

## 2025-04-17 RX ADMIN — KETOROLAC TROMETHAMINE 30 MILLIGRAM(S): 30 INJECTION, SOLUTION INTRAMUSCULAR; INTRAVENOUS at 22:41

## 2025-04-17 RX ADMIN — Medication 1 APPLICATION(S): at 05:26

## 2025-04-17 RX ADMIN — MIDODRINE HYDROCHLORIDE 20 MILLIGRAM(S): 5 TABLET ORAL at 11:25

## 2025-04-17 RX ADMIN — Medication 50 MILLIGRAM(S): at 22:41

## 2025-04-17 RX ADMIN — Medication 10 MILLIGRAM(S): at 22:41

## 2025-04-17 RX ADMIN — CEFEPIME 100 MILLIGRAM(S): 2 INJECTION, POWDER, FOR SOLUTION INTRAVENOUS at 05:15

## 2025-04-17 RX ADMIN — Medication 50 MILLIGRAM(S): at 14:36

## 2025-04-17 RX ADMIN — KETOROLAC TROMETHAMINE 30 MILLIGRAM(S): 30 INJECTION, SOLUTION INTRAMUSCULAR; INTRAVENOUS at 05:47

## 2025-04-17 RX ADMIN — FLUTICASONE PROPIONATE 1 SPRAY(S): 50 SPRAY, METERED NASAL at 05:16

## 2025-04-17 NOTE — PROGRESS NOTE ADULT - SUBJECTIVE AND OBJECTIVE BOX
Neurology Progress Note    S: Patient seen and examined.   in ICU. no HA        Medications: MEDICATIONS  (STANDING):  cefepime   IVPB 2000 milliGRAM(s) IV Intermittent every 8 hours  cefepime   IVPB      chlorhexidine 2% Cloths 1 Application(s) Topical <User Schedule>  DAPTOmycin IVPB      DAPTOmycin IVPB 600 milliGRAM(s) IV Intermittent every 24 hours  fluticasone propionate 50 MICROgram(s)/spray Nasal Spray 1 Spray(s) Both Nostrils two times a day  influenza   Vaccine 0.5 milliLiter(s) IntraMuscular once  midodrine. 20 milliGRAM(s) Oral three times a day  montelukast 10 milliGRAM(s) Oral daily  pantoprazole    Tablet 40 milliGRAM(s) Oral before breakfast  polyethylene glycol 3350 17 Gram(s) Oral two times a day  remodulin (treprostinil) SubQ infusion 1 Dose(s) 1 Dose(s) SubCutaneous Continuous Pump  rivaroxaban 15 milliGRAM(s) Oral with dinner  senna 2 Tablet(s) Oral at bedtime    MEDICATIONS  (PRN):  acetaminophen     Tablet .. 1000 milliGRAM(s) Oral every 6 hours PRN Mild Pain (1 - 3), Moderate Pain (4 - 6)  albuterol/ipratropium for Nebulization 3 milliLiter(s) Nebulizer every 6 hours PRN Shortness of Breath  diphenhydrAMINE Injectable 50 milliGRAM(s) IV Push every 8 hours PRN Itching  ketorolac   Injectable 30 milliGRAM(s) IV Push every 8 hours PRN Migraine  meclizine 12.5 milliGRAM(s) Oral once PRN Dizziness  melatonin 9 milliGRAM(s) Oral at bedtime PRN Insomnia  metoclopramide 10 milliGRAM(s) Oral every 8 hours PRN Migraine       Vitals:  Vital Signs Last 24 Hrs  T(C): 36.7 (17 Apr 2025 12:00), Max: 36.7 (16 Apr 2025 20:00)  T(F): 98.1 (17 Apr 2025 12:00), Max: 98.1 (16 Apr 2025 20:00)  HR: 70 (17 Apr 2025 16:00) (62 - 105)  BP: 161/97 (17 Apr 2025 16:00) (12/71 - 161/97)  BP(mean): 121 (17 Apr 2025 16:00) (67 - 121)  RR: 21 (17 Apr 2025 16:00) (12 - 30)  SpO2: 100% (17 Apr 2025 16:00) (99% - 100%)    Parameters below as of 17 Apr 2025 07:00  Patient On (Oxygen Delivery Method): nasal cannula    O2 Concentration (%): 2            General Exam:   General Appearance: Appropriately dressed and in no acute distress       Head: Normocephalic, atraumatic and no dysmorphic features  Ear, Nose, and Throat: Moist mucous membranes  CVS: S1S2+  Resp: No SOB, no wheeze or rhonchi  Abd: soft NTND  Extremities: No edema, no cyanosis  Skin: No bruises, no rashes     Neurological Exam:       level of consciousness: Awake, alert  Orientation: Oriented to person, age, place, and time  Language: Speech is fluent with intact naming, repetition, and ability to follow commands (including simple commands and complex cross commands)  Cortical Signs:  no hemineglect.   Content: Good overall fund of knowledge (aware of current events,and relevant past history)    HINTS+; improved      Hearing intact and  symmetric to finger rub b/l as above    - Cranial Nerves:   PERRL, VFF, EOMI, facial sensation is intact in the V1-V3 distribution bilaterally; face is symmetric with augusto smile; hearing is intact to finger rub bilaterally and equally; uvula is midline and soft palate rises symmetrically; shoulder shrug intact; tongue protrudes in the midline with intact lateral movements    - Motor Testing:  Bulk: Normal   Tone: Normal  Strength:  There is no pronator drift b/l  There is no leg drift b/l  NO orbiting  intact rapid finger rapping                              Right           Left  Should-Abduct      5                   5   -some pain limitations  Elbow-Flex             5                   5  Elbow-Ext              5                   5                        5                   5    Hip-Flex                 5                   5  Knee-Flex              5                   5  Knee-Ext                5                   5  Dorsiflex                5                   5  Plantarflex             5                   5    - Reflexes:              Right           Left  Triceps                     2+                2+  Biceps                      2+                 2+  Brachioradialis         2+                2+  Patellar                    2+                 2+    requires distraction to elicit all reflexes    - Sensory: Intact throughout to light touch.   - Coordination: Finger-nose-finger intact without dysmetria.   ROmberg negative   - Gait: Normal steps, base, arm swing, and turning.  Able to tandem walk. Able to heel and toe walk.    I personally reviewed the below data/images/labs:      LABS:                          10.0   8.20  )-----------( 242      ( 17 Apr 2025 00:24 )             32.2     04-17    138  |  105  |  7   ----------------------------<  77  4.2   |  19[L]  |  1.04    Ca    8.8      17 Apr 2025 00:24  Phos  3.2     04-17  Mg     1.8     04-17    TPro  6.5  /  Alb  3.7  /  TBili  0.5  /  DBili  x   /  AST  11  /  ALT  6[L]  /  AlkPhos  57  04-17    LIVER FUNCTIONS - ( 17 Apr 2025 00:24 )  Alb: 3.7 g/dL / Pro: 6.5 g/dL / ALK PHOS: 57 U/L / ALT: 6 U/L / AST: 11 U/L / GGT: x           PT/INR - ( 17 Apr 2025 00:28 )   PT: 20.4 sec;   INR: 1.80 ratio         PTT - ( 17 Apr 2025 00:28 )  PTT:48.2 sec  Urinalysis Basic - ( 17 Apr 2025 00:24 )    Color: x / Appearance: x / SG: x / pH: x  Gluc: 77 mg/dL / Ketone: x  / Bili: x / Urobili: x   Blood: x / Protein: x / Nitrite: x   Leuk Esterase: x / RBC: x / WBC x   Sq Epi: x / Non Sq Epi: x / Bacteria: x              < from: CT Head No Cont (04.07.25 @ 10:22) >    ACC: [id number] EXAM:  CT BRAIN   ORDERED BY:  SERINA BOWEN     PROCEDURE DATE:  04/07/2025          INTERPRETATION:  CLINICAL INFORMATION: headaches    COMPARISON: CT head 10/23/2024    CONTRAST:  IV Contrast: NONE  .    TECHNIQUE:  Serial axial images were obtained from the skull base to the   vertex using multi-slice helical technique. Sagittal and coronal   reformats were obtained.    FINDINGS:    VENTRICLES AND SULCI: Normal in size and configuration.  INTRA-AXIAL: No mass effect, acute hemorrhage, or midline shift.  EXTRA-AXIAL: No mass or fluid collection. Basal cisterns are normal in   appearance.    VISUALIZED SINUSES:  Clear.  TYMPANOMASTOID CAVITIES:  Clear.  VISUALIZED ORBITS: Normal.  CALVARIUM: Intact.    MISCELLANEOUS: None.      IMPRESSION:  No acute intracranial hemorrhage, mass effect, or midline shift.        --- End of Report ---            AAKASH SIMON MD; Attending Radiologist  This document has been electronically signed. Apr 7 2025 11:05AM    < end of copied text >

## 2025-04-17 NOTE — PROGRESS NOTE ADULT - ASSESSMENT
43 y/o female, PMH pHTN (on treprostinil infusion), HF (s/p PPM dual chamber 2016), chronic PE (dx 2017) on DOAC, SVT (s/p ablation 05/2020), asthma, h/o MRSA abdominal wall abscesses last 2/'25, and JULIA. was admitted to [facility] 10/13/24-10/29/24 for pre-transplant expedited workup. Sent to ED 4/2/25 from MD for abdominal abscess    CXR (4/2) Perihilar opacities, likely represents enlarged pulmonary arteries. Otherwise clear lungs.    CT A/P (4/2) Peripherally enhancing lesion in the right mid abdominal wall subjacent to diffuse skin thickening measuring 5.0 x 2.1 x 4.0 cm consistent with phlegmon/abscess. Underlying mass is not excluded. Superficial skin thickening in the left abdominal wall at the level of   the umbilicus. Underlying small abscess measures 8 mm.    s/p bedside abdominal wall abscess I&D on 4/3    #MRSA Abdominal Wall Abscess, Hypotension  --General surgery without plans for incision and drainage of the left-sided abdominal wall fluid collection  --wounds improving  --Continue daptomycin 600 mg IV every 24 hours.  Creatine Kinase: 94 U/L (04.16.25 @ 00:41)  -- abdominal wall wound growing pseudomonas continue Cefepime 2gm IV q 8hr  --plan to complete about 7days of abx    #Pre-Lung Transplant Evaluation  COVID19 Nucleocapsid Antibody Negative  COVID19 Rajinder Antibody Positive  HAV IgG Negative  HBVs Ab Negative  HBVsAg Negative  HBVc Ab Negative  HCV Ab Negative  HSV 1 IgG Positive  HSV 2 IgG Negative  EBV IgG Positive  CMV IgG Positive  VZV IgG Positive  Measles IgG Positive  Mumps IgG Positive  Rubella IgG Positive  Quantiferon Gold Negative  HIV Ag/Ab by CMIA Negative  Syphilis Screen Negative  Toxoplasma IgG Negative  Strongyloides Ab Negative  Trypanosoma cruzi Ab Negative  --ID clearance for lung transplant pending treatment of abdominal wall abscess    #Encounter to Vaccinate Patient  COVID19: Would benefit from COVID19 [id number] Vaccine Dose  Influenza: needs this , can be given at pulmonology office, deferring today not to give 2 reactogenic vaccines  RSV: Arexvy 3/6/25  Pneumococcal: States she had pneumococcal vaccination ~2022  HAV: Would benefit from Havrix  HBV: Heplisav Dose 1 3/6/25. Dose 2 of Heplisav prior to discharge  MMR: Rubella IgG pending. Mumps and Measles immune  Varicella: Immune will not require further vaccination  Shingles: Will require Shingrix  Tdap: Will require Tdap    I will continue to follow. Please feel free to contact me with any further questions.    Sreekanth Jauregui MD  Can be called via Teams  After 5pm/weekends [phone]

## 2025-04-17 NOTE — ED PROVIDER NOTE - NSTIMEPROVIDERCAREINITIATE_GEN_ER
Head,  normocephalic,  atraumatic,  Face,  Face within normal limits,  Ears,  External ears within normal limits,  Nose/Nasopharynx,  External nose  normal appearance Lips,  Appearance normal
12-Aug-2023 08:40

## 2025-04-17 NOTE — PROGRESS NOTE ADULT - SUBJECTIVE AND OBJECTIVE BOX
INFECTIOUS DISEASES FOLLOW UP-- Aleja Jauregui  [phone]    This is a follow up note for this  44yFemale with  Cellulitis of abdominal wall    undergoing bowel prep for virtual colonoscopy    ROS:  CONSTITUTIONAL:  No fever, good appetite  CARDIOVASCULAR:  No chest pain or palpitations  RESPIRATORY:  No dyspnea  GASTROINTESTINAL:  No nausea, vomiting, notes diarrhea, or abdominal pain  GENITOURINARY:  No dysuria  NEUROLOGIC:  No headache,     Allergies    vancomycin (Rash)  penicillin (Rash)  adhesives (Rash)    Intolerances        ANTIBIOTICS/RELEVANT:  antimicrobials  cefepime   IVPB 2000 milliGRAM(s) IV Intermittent every 8 hours  cefepime   IVPB      DAPTOmycin IVPB      DAPTOmycin IVPB 600 milliGRAM(s) IV Intermittent every 24 hours    immunologic:  influenza   Vaccine 0.5 milliLiter(s) IntraMuscular once    OTHER:  acetaminophen     Tablet .. 1000 milliGRAM(s) Oral every 6 hours PRN  albuterol/ipratropium for Nebulization 3 milliLiter(s) Nebulizer every 6 hours PRN  chlorhexidine 2% Cloths 1 Application(s) Topical <User Schedule>  diphenhydrAMINE Injectable 50 milliGRAM(s) IV Push every 8 hours PRN  fluticasone propionate 50 MICROgram(s)/spray Nasal Spray 1 Spray(s) Both Nostrils two times a day  ketorolac   Injectable 30 milliGRAM(s) IV Push every 8 hours PRN  meclizine 12.5 milliGRAM(s) Oral once PRN  melatonin 9 milliGRAM(s) Oral at bedtime PRN  metoclopramide 10 milliGRAM(s) Oral every 8 hours PRN  midodrine. 10 milliGRAM(s) Oral three times a day  montelukast 10 milliGRAM(s) Oral daily  pantoprazole    Tablet 40 milliGRAM(s) Oral before breakfast  polyethylene glycol 3350 17 Gram(s) Oral two times a day  remodulin (treprostinil) SubQ infusion 1 Dose(s) 1 Dose(s) SubCutaneous Continuous Pump  rivaroxaban 15 milliGRAM(s) Oral with dinner  senna 2 Tablet(s) Oral at bedtime      Objective:  Vital Signs Last 24 Hrs  T(C): 36.4 (17 Apr 2025 20:00), Max: 36.7 (17 Apr 2025 00:00)  T(F): 97.5 (17 Apr 2025 20:00), Max: 98.1 (17 Apr 2025 06:00)  HR: 87 (17 Apr 2025 22:00) (62 - 105)  BP: 131/84 (17 Apr 2025 22:00) (12/71 - 161/97)  BP(mean): 102 (17 Apr 2025 22:00) (67 - 121)  RR: 26 (17 Apr 2025 22:00) (12 - 30)  SpO2: 98% (17 Apr 2025 22:00) (95% - 100%)    Parameters below as of 17 Apr 2025 20:00  Patient On (Oxygen Delivery Method): nasal cannula  O2 Flow (L/min): 2      PHYSICAL EXAM:  Constitutional:no acute distress  Eyes:DEMETRIUS, EOMI  Ear/Nose/Throat: no oral lesions, 	  Respiratory: clear BL  Cardiovascular: S1S2  Gastrointestinal:soft, (+) BS, no tenderness  abdominal wounds healing  Extremities:no e/e/c  No Lymphadenopathy  IV sites not inflammed.    LABS:                        10.0   8.20  )-----------( 242      ( 17 Apr 2025 00:24 )             32.2     04-17    138  |  105  |  7   ----------------------------<  77  4.2   |  19[L]  |  1.04    Ca    8.8      17 Apr 2025 00:24  Phos  3.2     04-17  Mg     1.8     04-17    TPro  6.5  /  Alb  3.7  /  TBili  0.5  /  DBili  x   /  AST  11  /  ALT  6[L]  /  AlkPhos  57  04-17    PT/INR - ( 17 Apr 2025 00:28 )   PT: 20.4 sec;   INR: 1.80 ratio         PTT - ( 17 Apr 2025 00:28 )  PTT:48.2 sec  Urinalysis Basic - ( 17 Apr 2025 00:24 )    Color: x / Appearance: x / SG: x / pH: x  Gluc: 77 mg/dL / Ketone: x  / Bili: x / Urobili: x   Blood: x / Protein: x / Nitrite: x   Leuk Esterase: x / RBC: x / WBC x   Sq Epi: x / Non Sq Epi: x / Bacteria: x        MICROBIOLOGY:            RECENT CULTURES:  04-13 @ 14:21  Exudate Incision  Pseudomonas aeruginosa  Pseudomonas aeruginosa  BRIAN    Rare Pseudomonas aeruginosa  --  04-11 @ 16:30  Blood Blood  --  --  --    No growth at 5 days  --  04-11 @ 16:25  Blood Blood  --  --  --    No growth at 5 days  --      RADIOLOGY & ADDITIONAL STUDIES:

## 2025-04-17 NOTE — PROGRESS NOTE ADULT - ASSESSMENT
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 This hospitalization she has had hypotension to low 80s (starting midodrine 4/9)  Neurology consulted 4/9/25 for headaches and vertigo.  -Headaches similar to prior but more frequent and more severe. +throbbing +photophobia . Responds to oxycodone / hydromorphone but not acetaminophen  -Vertigo is positional and paroxysmal. Responded well to meclizine 12.5mg x 1  -Neuro exam with left corrective saccade on right head impulse, only end gaze nystagmus, and no skew   She has chronic headaches for last 2 years. Headaches were holocephalic, perhaps worse posteriorly, throbb  CTH (4/7/25) unrevealing  s/p LLQ collection aspiration   s/p MRCP 4/15     Impression:  1) Migraine without aura, worse in setting of acute medical illness; imporved   2) Peripheral vertigo; better    Recommendations  #Migraine without aura  Baseline 1 episode per week, now daily and more intense while hospitalized responsive to opioids here, less so to acetaminophen   -Consider non-opioid treatment of head pain  -First line: recommend migraine medications (to be given all together at the same time if no contraindications form comorbitities): ketorolac (Toradol) 30mg IV q8h PRN (or acetaminophen 1g IV q8h PRN), metoclopramide (Reglan) 10mg q8h PRN, diphenhydramine (Benadryl) 25mg IV q8h PRN. Please repeat at least 2-3 cycles for medications to be effective.  -IV hydration, Mg 2g IV x1    #Peripheral Vertigo  -Physical therapy  -Vestibular Rehab Referral  -ENT Referral  -[email or web address] for patient education  -Check orthostatic vitals   -IV Fluids as safe/able (after orthostatic vitals) consider bolus and then maintenance fluids if no contraindications  -Meclizine PRN, she responded well to 1x dose of 12.5mg    -remaining per primary team/ICU   - lung transplant evaluation in progress   - cefepime/dapto per ID   - xarelto for PE   Triston Wynnhanidar, MD  Vascular Neurology  Office: [phone] .

## 2025-04-17 NOTE — PROGRESS NOTE ADULT - ATTENDING COMMENTS
1. Pulmonary HTN. Continue Remodulin SQ. Will remove for 1-2 hours so pt can get MRI abd..   2.ID. Continue Daptomycin for superficial abd abscesses. Drained yesterday.  3.Asthma: Continue inhaled steroids and bronchodilators.  4. Migraine headaches. Continue headache regimen prn  5. Lung transplant work up. For CT colonoscopy tomorrow.  6.DVT prophylaxis: Continue Xarelto  7. H/O  PE.  Continue Xarelto  8. GOC. Full code

## 2025-04-17 NOTE — PROGRESS NOTE ADULT - ASSESSMENT
************************  Amirah Rowe,    Emergency Medicine PGY-1  ************************    Patient is a 44y old  Female who presents with a chief complaint of Abdominal abscess (13 Apr 2025 07:19)    Overnight no acute events.     MEDICATIONS  (STANDING):  buDESOnide    Inhalation Suspension 0.5 milliGRAM(s) Inhalation daily  chlorhexidine 2% Cloths 1 Application(s) Topical <User Schedule>  DAPTOmycin IVPB      DAPTOmycin IVPB 600 milliGRAM(s) IV Intermittent every 24 hours  fluticasone propionate 50 MICROgram(s)/spray Nasal Spray 1 Spray(s) Both Nostrils two times a day  heparin   Injectable 5000 Unit(s) SubCutaneous every 8 hours  influenza   Vaccine 0.5 milliLiter(s) IntraMuscular once  midodrine. 20 milliGRAM(s) Oral three times a day  montelukast 10 milliGRAM(s) Oral daily  pantoprazole    Tablet 40 milliGRAM(s) Oral before breakfast  polyethylene glycol 3350 17 Gram(s) Oral two times a day  remodulin (treprostinil) SubQ infusion 1 Dose(s) 1 Dose(s) SubCutaneous Continuous Pump  senna 2 Tablet(s) Oral at bedtime    MEDICATIONS  (PRN):  acetaminophen     Tablet .. 1000 milliGRAM(s) Oral every 6 hours PRN Mild Pain (1 - 3), Moderate Pain (4 - 6)  albuterol/ipratropium for Nebulization 3 milliLiter(s) Nebulizer every 6 hours PRN Shortness of Breath  diphenhydrAMINE Injectable 25 milliGRAM(s) IV Push every 8 hours PRN Migraine  ketorolac   Injectable 30 milliGRAM(s) IV Push every 8 hours PRN Migraine  meclizine 12.5 milliGRAM(s) Oral once PRN Dizziness  melatonin 9 milliGRAM(s) Oral at bedtime PRN Insomnia  metoclopramide 10 milliGRAM(s) Oral every 8 hours PRN Migraine  oxyCODONE    IR 2.5 milliGRAM(s) Oral every 6 hours PRN Severe Pain (7 - 10)      CAPILLARY BLOOD GLUCOSE        I&O's Summary    13 Apr 2025 07:01  -  14 Apr 2025 07:00  --------------------------------------------------------  IN: 650 mL / OUT: 400 mL / NET: 250 mL        PHYSICAL EXAM:  Vital Signs Last 24 Hrs  T(C): 36.7 (14 Apr 2025 04:00), Max: 36.9 (13 Apr 2025 16:00)  T(F): 98 (14 Apr 2025 04:00), Max: 98.4 (13 Apr 2025 16:00)  HR: 83 (14 Apr 2025 07:00) (66 - 91)  BP: 107/62 (14 Apr 2025 07:00) (81/46 - 120/57)  BP(mean): 81 (14 Apr 2025 07:00) (65 - 82)  RR: 17 (14 Apr 2025 07:00) (11 - 45)  SpO2: 100% (14 Apr 2025 07:00) (92% - 100%)    Parameters below as of 13 Apr 2025 20:00  Patient On (Oxygen Delivery Method): room air        PHYSICAL EXAM:  GENERAL: NAD, lying in bed comfortably  HEENT: NC/AT  CHEST/LUNG: CTAB, no increased WOB  HEART: RRR, no m/r/g  ABDOMEN: soft, NT, ND, BS+  NERVOUS SYSTEM:  A&Ox3    LABS:                        11.0   9.61  )-----------( 337      ( 13 Apr 2025 22:36 )             34.3     04-13    137  |  103  |  11  ----------------------------<  84  4.3   |  22  |  1.06    Ca    8.5      13 Apr 2025 22:36  Phos  3.0     04-13  Mg     2.1     04-13    TPro  6.8  /  Alb  3.8  /  TBili  0.8  /  DBili  x   /  AST  11  /  ALT  7[L]  /  AlkPhos  62  04-13    PT/INR - ( 13 Apr 2025 22:36 )   PT: 13.7 sec;   INR: 1.19 ratio         PTT - ( 13 Apr 2025 22:36 )  PTT:39.6 sec      Urinalysis Basic - ( 13 Apr 2025 22:36 )    Color: x / Appearance: x / SG: x / pH: x  Gluc: 84 mg/dL / Ketone: x  / Bili: x / Urobili: x   Blood: x / Protein: x / Nitrite: x   Leuk Esterase: x / RBC: x / WBC x   Sq Epi: x / Non Sq Epi: x / Bacteria: x        Culture - Blood (collected 11 Apr 2025 16:30)  Source: Blood Blood  Preliminary Report (13 Apr 2025 22:01):    No growth at 48 Hours    Culture - Blood (collected 11 Apr 2025 16:25)  Source: Blood Blood  Preliminary Report (13 Apr 2025 22:01):    No growth at 48 Hours    Assessment and Plan:    Neurologic:  -AAOx3    #migraines  -Headache cocktail per neuro: "-First line: recommend migraine medications (to be given all together at the same time if no contraindications form comorbitities): ketorolac (Toradol) 30mg IV q8h PRN (or acetaminophen 1g IV q8h PRN), metoclopramide (Reglan) 10mg q8h PRN, diphenhydramine (Benadryl) 25mg IV q8h PRN. Please repeat at least 2-3 cycles for medications to be effective."  -Currently managed with oxycodone PRN    #peripheral vertigo  -meclizine 12.5 mg PRN     Cardiovascular:  #Hypotension:  -Hold bumetanide, spironolactone, sildenafil, and nifedipine.  -midodrine 20 mg TID.  -Heart failure signing off as repeat TTE similar to prior     #Right Heart Failure:  -Hold bumetanide, spironolactone, and nifedipine.  -Heart failure signing off as repeat TTE similar to prior   - s/p TTE  -MYRIAM on 4/16 for pulmonic valve eval pending      #Pulmonary Hypertension:  -Continue home Remodulin subQ 51 ng/kg/min.  -midline in place by IR on 4/11  -Check BNP  - s/p TTE  - MYRIAM on 4/16 without evidence of pulmonic damage   - Transplant Pulmonology following    #Chronic PE  - Xarelto dc'ed in anticipation potential I&D  - Xarelto restarted on 4/14 post surgical aspiration of LLQ collection    Pulmonary:  #Pulmonary Hypertension:  -Continue home Remodulin subQ 51 ng/kg/min.  -midline in place by IR on 4/11  -Check BNP  - s/p TTE  -MYRIAM on 4/16 for pulmonic valve eval pending  - Transplant Pulmonology following    #Asthma  Continue home ipratropium (Atrovent) nebulizer PRN.  Continue home budesonide/formoterol (Pulmicort), tiotropium (Spiriva), and PRN nebulizers.  Continue fluticasone (Flonase).  Maintain supplemental O2 2L NC, keep SpO2 > 92%, avoid hyperoxia.    #Chronic PE:  - Xarelto dc'ed in anticipation potential I&D  - Xarelto restarted on 4/14 post surgical aspiration of LLQ collection      #Lung Transplant Evaluation  Ongoing pre-lung transplant workup.  Transplant Pulm requesting LLQ abscess I&D for medical optimization for lung transplant candidacy   Seen by pulmonary transplant service.  Patient is being considered for inpatient transplant workup per Dr. Wise.  Needs cardiac MRI (requires transition to MRI-compatible pump—pulmonary transplant team working on this).  cardiac MRI on 4/14 @ 01:00 PM   -s/p cardiac MRI  - MRCP on 4/15  - MYRIAM  - CT colongraphy on 4/18 for further workup for iron deficiency anemia     /Renal:  -no active issues  -replete electrolytes as needed     GI:  #Abdominal Abscess  -linezolid 600 mg PO q12h for 10-14 days until 4/16 was dc'ed by ID and switched to daptomycin 6 mg/kg until 4/19  -Per general surgery, no acute surgical intervention at this time for abscess.   -Transplant Pulm requesting LLQ abscess I&D for medical optimization for lung transplant candidacy   -s/p LLQ collection aspiration on 4/13  -wound cultures grew pseudomonas -> added IV cefepime 4/16     #yeast infection  -diflucan 150 mg given on 4/15    #constipation  -Continue Miralax BID and senna    Hematology/Onc:  #Iron Deficiency Anemia (JULIA)  -outpatient HIE note from 03/21/25 with dx of microcytic anemia (iron deficiency vs hemoglobinopathy)  - venofer x 3 doses to end on 4/17  - f/u Hb electrophoresis   - CT colongraphy on 4/18 for further workup for iron deficiency anemia     ID:  #Abdominal Abscess  linezolid 600 mg PO q12h for 10-14 days until 4/16 was dc'ed by ID and switched to daptomycin 6 mg/kg until 4/19  wound cultures grew pseudomonas -> added IV cefepime 4/16     #penicillin allergy   -per allergy note on 4/10 from inpatient team was recommending a graded challenge, appears to not have occurred as of yet     - administer 10% of standard amoxicillin dose, then observe for 30 minutes     - if tolerated, administer remaining 90% of standard amoxicillin dose, then observe for 60 minutes  - if patient does not develop immediate reactions then is considered to be not allergic and penicillin allergy can be delabeled  -will trial after conclusion of imaging^

## 2025-04-18 LAB
ALBUMIN SERPL ELPH-MCNC: 3.6 G/DL — SIGNIFICANT CHANGE UP (ref 3.3–5)
ALP SERPL-CCNC: 52 U/L — SIGNIFICANT CHANGE UP (ref 40–120)
ALT FLD-CCNC: 9 U/L — LOW (ref 10–45)
ANION GAP SERPL CALC-SCNC: 12 MMOL/L — SIGNIFICANT CHANGE UP (ref 5–17)
APTT BLD: 49.2 SEC — HIGH (ref 24.5–35.6)
AST SERPL-CCNC: 16 U/L — SIGNIFICANT CHANGE UP (ref 10–40)
BILIRUB SERPL-MCNC: 0.4 MG/DL — SIGNIFICANT CHANGE UP (ref 0.2–1.2)
BUN SERPL-MCNC: 5 MG/DL — LOW (ref 7–23)
CALCIUM SERPL-MCNC: 8.1 MG/DL — LOW (ref 8.4–10.5)
CHLORIDE SERPL-SCNC: 111 MMOL/L — HIGH (ref 96–108)
CO2 SERPL-SCNC: 17 MMOL/L — LOW (ref 22–31)
CREAT SERPL-MCNC: 0.94 MG/DL — SIGNIFICANT CHANGE UP (ref 0.5–1.3)
CULTURE RESULTS: ABNORMAL
EGFR: 77 ML/MIN/1.73M2 — SIGNIFICANT CHANGE UP
EGFR: 77 ML/MIN/1.73M2 — SIGNIFICANT CHANGE UP
GLUCOSE SERPL-MCNC: 107 MG/DL — HIGH (ref 70–99)
HCT VFR BLD CALC: 31.5 % — LOW (ref 34.5–45)
HGB BLD-MCNC: 9.7 G/DL — LOW (ref 11.5–15.5)
IGG FLD-MCNC: 1044 MG/DL — SIGNIFICANT CHANGE UP (ref 610–1660)
IGG1 SER-MCNC: 503 MG/DL — SIGNIFICANT CHANGE UP (ref 240–1118)
IGG2 SER-MCNC: 486 MG/DL — SIGNIFICANT CHANGE UP (ref 124–549)
IGG3 SER-MCNC: 36.3 MG/DL — SIGNIFICANT CHANGE UP (ref 15–102)
IGG4 SER-MCNC: 22.9 MG/DL — SIGNIFICANT CHANGE UP (ref 1–123)
INR BLD: 1.61 RATIO — HIGH (ref 0.85–1.16)
MAGNESIUM SERPL-MCNC: 2 MG/DL — SIGNIFICANT CHANGE UP (ref 1.6–2.6)
MCHC RBC-ENTMCNC: 22.2 PG — LOW (ref 27–34)
MCHC RBC-ENTMCNC: 30.8 G/DL — LOW (ref 32–36)
MCV RBC AUTO: 72.1 FL — LOW (ref 80–100)
NRBC BLD AUTO-RTO: 0 /100 WBCS — SIGNIFICANT CHANGE UP (ref 0–0)
ORGANISM # SPEC MICROSCOPIC CNT: ABNORMAL
ORGANISM # SPEC MICROSCOPIC CNT: ABNORMAL
PHOSPHATE SERPL-MCNC: 2.6 MG/DL — SIGNIFICANT CHANGE UP (ref 2.5–4.5)
PLATELET # BLD AUTO: 205 K/UL — SIGNIFICANT CHANGE UP (ref 150–400)
POTASSIUM SERPL-MCNC: 3.6 MMOL/L — SIGNIFICANT CHANGE UP (ref 3.5–5.3)
POTASSIUM SERPL-SCNC: 3.6 MMOL/L — SIGNIFICANT CHANGE UP (ref 3.5–5.3)
PROT SERPL-MCNC: 6.3 G/DL — SIGNIFICANT CHANGE UP (ref 6–8.3)
PROTHROM AB SERPL-ACNC: 18.3 SEC — HIGH (ref 9.9–13.4)
RBC # BLD: 4.37 M/UL — SIGNIFICANT CHANGE UP (ref 3.8–5.2)
RBC # FLD: 17.5 % — HIGH (ref 10.3–14.5)
SODIUM SERPL-SCNC: 140 MMOL/L — SIGNIFICANT CHANGE UP (ref 135–145)
SPECIMEN SOURCE: SIGNIFICANT CHANGE UP
WBC # BLD: 6.9 K/UL — SIGNIFICANT CHANGE UP (ref 3.8–10.5)
WBC # FLD AUTO: 6.9 K/UL — SIGNIFICANT CHANGE UP (ref 3.8–10.5)

## 2025-04-18 PROCEDURE — 99232 SBSQ HOSP IP/OBS MODERATE 35: CPT

## 2025-04-18 PROCEDURE — 74261 CT COLONOGRAPHY DX: CPT | Mod: 26

## 2025-04-18 PROCEDURE — 99233 SBSQ HOSP IP/OBS HIGH 50: CPT | Mod: GC

## 2025-04-18 RX ORDER — KETOROLAC TROMETHAMINE 30 MG/ML
15 INJECTION, SOLUTION INTRAMUSCULAR; INTRAVENOUS ONCE
Refills: 0 | Status: DISCONTINUED | OUTPATIENT
Start: 2025-04-18 | End: 2025-04-18

## 2025-04-18 RX ORDER — KETOROLAC TROMETHAMINE 30 MG/ML
15 INJECTION, SOLUTION INTRAMUSCULAR; INTRAVENOUS EVERY 8 HOURS
Refills: 0 | Status: DISCONTINUED | OUTPATIENT
Start: 2025-04-18 | End: 2025-04-19

## 2025-04-18 RX ORDER — MAGNESIUM SULFATE 500 MG/ML
2 SYRINGE (ML) INJECTION ONCE
Refills: 0 | Status: COMPLETED | OUTPATIENT
Start: 2025-04-17 | End: 2025-04-18

## 2025-04-18 RX ADMIN — CEFEPIME 100 MILLIGRAM(S): 2 INJECTION, POWDER, FOR SOLUTION INTRAVENOUS at 05:08

## 2025-04-18 RX ADMIN — KETOROLAC TROMETHAMINE 15 MILLIGRAM(S): 30 INJECTION, SOLUTION INTRAMUSCULAR; INTRAVENOUS at 08:00

## 2025-04-18 RX ADMIN — KETOROLAC TROMETHAMINE 15 MILLIGRAM(S): 30 INJECTION, SOLUTION INTRAMUSCULAR; INTRAVENOUS at 15:41

## 2025-04-18 RX ADMIN — FLUTICASONE PROPIONATE 1 SPRAY(S): 50 SPRAY, METERED NASAL at 15:42

## 2025-04-18 RX ADMIN — Medication 25 GRAM(S): at 05:08

## 2025-04-18 RX ADMIN — CEFEPIME 100 MILLIGRAM(S): 2 INJECTION, POWDER, FOR SOLUTION INTRAVENOUS at 22:11

## 2025-04-18 RX ADMIN — KETOROLAC TROMETHAMINE 15 MILLIGRAM(S): 30 INJECTION, SOLUTION INTRAMUSCULAR; INTRAVENOUS at 23:00

## 2025-04-18 RX ADMIN — Medication 50 MILLIGRAM(S): at 07:31

## 2025-04-18 RX ADMIN — KETOROLAC TROMETHAMINE 15 MILLIGRAM(S): 30 INJECTION, SOLUTION INTRAMUSCULAR; INTRAVENOUS at 07:31

## 2025-04-18 RX ADMIN — KETOROLAC TROMETHAMINE 15 MILLIGRAM(S): 30 INJECTION, SOLUTION INTRAMUSCULAR; INTRAVENOUS at 16:00

## 2025-04-18 RX ADMIN — Medication 50 MILLIGRAM(S): at 23:00

## 2025-04-18 RX ADMIN — Medication 10 MILLIGRAM(S): at 07:31

## 2025-04-18 RX ADMIN — Medication 10 MILLIGRAM(S): at 15:42

## 2025-04-18 RX ADMIN — CEFEPIME 100 MILLIGRAM(S): 2 INJECTION, POWDER, FOR SOLUTION INTRAVENOUS at 14:19

## 2025-04-18 RX ADMIN — Medication 9 MILLIGRAM(S): at 22:11

## 2025-04-18 RX ADMIN — Medication 1 APPLICATION(S): at 05:08

## 2025-04-18 RX ADMIN — Medication 10 MILLIGRAM(S): at 23:00

## 2025-04-18 RX ADMIN — Medication 40 MILLIGRAM(S): at 07:31

## 2025-04-18 RX ADMIN — KETOROLAC TROMETHAMINE 15 MILLIGRAM(S): 30 INJECTION, SOLUTION INTRAMUSCULAR; INTRAVENOUS at 23:30

## 2025-04-18 RX ADMIN — FLUTICASONE PROPIONATE 1 SPRAY(S): 50 SPRAY, METERED NASAL at 05:09

## 2025-04-18 RX ADMIN — RIVAROXABAN 15 MILLIGRAM(S): 10 TABLET, FILM COATED ORAL at 15:42

## 2025-04-18 RX ADMIN — Medication 50 MILLIGRAM(S): at 15:41

## 2025-04-18 RX ADMIN — MONTELUKAST SODIUM 10 MILLIGRAM(S): 10 TABLET ORAL at 12:32

## 2025-04-18 RX ADMIN — DAPTOMYCIN 124 MILLIGRAM(S): 500 INJECTION, POWDER, LYOPHILIZED, FOR SOLUTION INTRAVENOUS at 12:32

## 2025-04-18 NOTE — PROGRESS NOTE ADULT - ATTENDING COMMENTS
1. Pulmonary HTN. Continue Remodulin SQ.   2.ID. Continue cefepime for superficial abd abscesses  3.Asthma: Continue inhaled steroids and bronchodilators.  4. Migraine headaches. Continue headache regimen prn  5. Lung transplant work up. For CT colonoscopy today  6.DVT prophylaxis: Continue Xarelto  7. H/O  PE.  Continue Xarelto  8. GOC. Full code

## 2025-04-18 NOTE — PROGRESS NOTE ADULT - SUBJECTIVE AND OBJECTIVE BOX
Neurology Progress Note    S: Patient seen and examined.   in ICU. no HA         Medications: MEDICATIONS  (STANDING):  cefepime   IVPB 2000 milliGRAM(s) IV Intermittent every 8 hours  cefepime   IVPB      chlorhexidine 2% Cloths 1 Application(s) Topical <User Schedule>  fluticasone propionate 50 MICROgram(s)/spray Nasal Spray 1 Spray(s) Both Nostrils two times a day  montelukast 10 milliGRAM(s) Oral daily  pantoprazole    Tablet 40 milliGRAM(s) Oral before breakfast  remodulin (treprostinil) SubQ infusion 1 Dose(s) 1 Dose(s) SubCutaneous Continuous Pump  rivaroxaban 15 milliGRAM(s) Oral with dinner    MEDICATIONS  (PRN):  acetaminophen     Tablet .. 1000 milliGRAM(s) Oral every 6 hours PRN Mild Pain (1 - 3), Moderate Pain (4 - 6)  albuterol/ipratropium for Nebulization 3 milliLiter(s) Nebulizer every 6 hours PRN Shortness of Breath  diphenhydrAMINE Injectable 50 milliGRAM(s) IV Push every 8 hours PRN Itching  meclizine 12.5 milliGRAM(s) Oral once PRN Dizziness  melatonin 9 milliGRAM(s) Oral at bedtime PRN Insomnia  metoclopramide 10 milliGRAM(s) Oral every 8 hours PRN Migraine       Vitals:  Vital Signs Last 24 Hrs  T(C): 36.3 (18 Apr 2025 15:50), Max: 36.4 (17 Apr 2025 20:00)  T(F): 97.3 (18 Apr 2025 15:50), Max: 97.5 (17 Apr 2025 20:00)  HR: 113 (18 Apr 2025 16:40) (80 - 113)  BP: 118/74 (18 Apr 2025 15:50) (99/62 - 145/91)  BP(mean): 93 (18 Apr 2025 15:50) (74 - 112)  RR: 22 (18 Apr 2025 16:40) (14 - 30)  SpO2: 100% (18 Apr 2025 16:40) (94% - 100%)    Parameters below as of 18 Apr 2025 12:16  Patient On (Oxygen Delivery Method): nasal cannula  O2 Flow (L/min): 2       General Exam:   General Appearance: Appropriately dressed and in no acute distress       Head: Normocephalic, atraumatic and no dysmorphic features  Ear, Nose, and Throat: Moist mucous membranes  CVS: S1S2+  Resp: No SOB, no wheeze or rhonchi  Abd: soft NTND  Extremities: No edema, no cyanosis  Skin: No bruises, no rashes     Neurological Exam:       level of consciousness: Awake, alert  Orientation: Oriented to person, age, place, and time  Language: Speech is fluent with intact naming, repetition, and ability to follow commands (including simple commands and complex cross commands)  Cortical Signs:  no hemineglect.   Content: Good overall fund of knowledge (aware of current events,and relevant past history)    HINTS+; improved      Hearing intact and  symmetric to finger rub b/l as above    - Cranial Nerves:   PERRL, VFF, EOMI, facial sensation is intact in the V1-V3 distribution bilaterally; face is symmetric with augusto smile; hearing is intact to finger rub bilaterally and equally; uvula is midline and soft palate rises symmetrically; shoulder shrug intact; tongue protrudes in the midline with intact lateral movements    - Motor Testing:  Bulk: Normal   Tone: Normal  Strength:  There is no pronator drift b/l  There is no leg drift b/l  NO orbiting  intact rapid finger rapping                              Right           Left  Should-Abduct      5                   5   -some pain limitations  Elbow-Flex             5                   5  Elbow-Ext              5                   5                        5                   5    Hip-Flex                 5                   5  Knee-Flex              5                   5  Knee-Ext                5                   5  Dorsiflex                5                   5  Plantarflex             5                   5    - Reflexes:              Right           Left  Triceps                     2+                2+  Biceps                      2+                 2+  Brachioradialis         2+                2+  Patellar                    2+                 2+    requires distraction to elicit all reflexes    - Sensory: Intact throughout to light touch.   - Coordination: Finger-nose-finger intact without dysmetria.   ROmberg negative   - Gait: Normal steps, base, arm swing, and turning.  Able to tandem walk. Able to heel and toe walk.    I personally reviewed the below data/images/labs:       LABS:                          9.8    6.30  )-----------( 211      ( 17 Apr 2025 23:01 )             31.0     04-17    137  |  106  |  5[L]  ----------------------------<  95  3.7   |  17[L]  |  0.96    Ca    8.4      17 Apr 2025 23:01  Phos  2.6     04-17  Mg     1.7     04-17    TPro  6.6  /  Alb  3.7  /  TBili  0.6  /  DBili  x   /  AST  14  /  ALT  7[L]  /  AlkPhos  53  04-17    LIVER FUNCTIONS - ( 17 Apr 2025 23:01 )  Alb: 3.7 g/dL / Pro: 6.6 g/dL / ALK PHOS: 53 U/L / ALT: 7 U/L / AST: 14 U/L / GGT: x           PT/INR - ( 17 Apr 2025 23:01 )   PT: 24.5 sec;   INR: 2.15 ratio         PTT - ( 17 Apr 2025 23:01 )  PTT:48.0 sec  Urinalysis Basic - ( 17 Apr 2025 23:01 )    Color: x / Appearance: x / SG: x / pH: x  Gluc: 95 mg/dL / Ketone: x  / Bili: x / Urobili: x   Blood: x / Protein: x / Nitrite: x   Leuk Esterase: x / RBC: x / WBC x   Sq Epi: x / Non Sq Epi: x / Bacteria: x                    < from: CT Head No Cont (04.07.25 @ 10:22) >    ACC: [id number] EXAM:  CT BRAIN   ORDERED BY:  SERINA BOWEN     PROCEDURE DATE:  04/07/2025          INTERPRETATION:  CLINICAL INFORMATION: headaches    COMPARISON: CT head 10/23/2024    CONTRAST:  IV Contrast: NONE  .    TECHNIQUE:  Serial axial images were obtained from the skull base to the   vertex using multi-slice helical technique. Sagittal and coronal   reformats were obtained.    FINDINGS:    VENTRICLES AND SULCI: Normal in size and configuration.  INTRA-AXIAL: No mass effect, acute hemorrhage, or midline shift.  EXTRA-AXIAL: No mass or fluid collection. Basal cisterns are normal in   appearance.    VISUALIZED SINUSES:  Clear.  TYMPANOMASTOID CAVITIES:  Clear.  VISUALIZED ORBITS: Normal.  CALVARIUM: Intact.    MISCELLANEOUS: None.      IMPRESSION:  No acute intracranial hemorrhage, mass effect, or midline shift.        --- End of Report ---            AAKASH SIMON MD; Attending Radiologist  This document has been electronically signed. Apr 7 2025 11:05AM    < end of copied text >

## 2025-04-18 NOTE — PROGRESS NOTE ADULT - ASSESSMENT
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 This hospitalization she has had hypotension to low 80s (starting midodrine 4/9)  Neurology consulted 4/9/25 for headaches and vertigo.  -Headaches similar to prior but more frequent and more severe. +throbbing +photophobia . Responds to oxycodone / hydromorphone but not acetaminophen  -Vertigo is positional and paroxysmal. Responded well to meclizine 12.5mg x 1  -Neuro exam with left corrective saccade on right head impulse, only end gaze nystagmus, and no skew   She has chronic headaches for last 2 years. Headaches were holocephalic, perhaps worse posteriorly, throbb  CTH (4/7/25) unrevealing  s/p LLQ collection aspiration   s/p MRCP 4/15     Impression:  1) Migraine without aura, worse in setting of acute medical illness; imporved   2) Peripheral vertigo; better    Recommendations  #Migraine without aura  Baseline 1 episode per week, now daily and more intense while hospitalized responsive to opioids here, less so to acetaminophen   -Consider non-opioid treatment of head pain  -First line: recommend migraine medications (to be given all together at the same time if no contraindications form comorbitities): ketorolac (Toradol) 30mg IV q8h PRN (or acetaminophen 1g IV q8h PRN), metoclopramide (Reglan) 10mg q8h PRN, diphenhydramine (Benadryl) 25mg IV q8h PRN. Please repeat at least 2-3 cycles for medications to be effective.  -IV hydration, Mg 2g IV x1    #Peripheral Vertigo  -Physical therapy  -Vestibular Rehab Referral  -ENT Referral  -[email or web address] for patient education  -Check orthostatic vitals   -IV Fluids as safe/able (after orthostatic vitals) consider bolus and then maintenance fluids if no contraindications  -Meclizine PRN, she responded well to 1x dose of 12.5mg    -remaining per primary team/ICU   - lung transplant evaluation in progress   - cefepime per ID   - xarelto for PE   Triston Alexandre, MD  Vascular Neurology  Office: [phone] .

## 2025-04-18 NOTE — PROGRESS NOTE ADULT - ASSESSMENT
************************  Amirah Rowe,    Emergency Medicine PGY-1  ************************    Patient is a 44y old  Female who presents with a chief complaint of Abdominal abscess (13 Apr 2025 07:19)    Overnight no acute events.     MEDICATIONS  (STANDING):  buDESOnide    Inhalation Suspension 0.5 milliGRAM(s) Inhalation daily  chlorhexidine 2% Cloths 1 Application(s) Topical <User Schedule>  DAPTOmycin IVPB      DAPTOmycin IVPB 600 milliGRAM(s) IV Intermittent every 24 hours  fluticasone propionate 50 MICROgram(s)/spray Nasal Spray 1 Spray(s) Both Nostrils two times a day  heparin   Injectable 5000 Unit(s) SubCutaneous every 8 hours  influenza   Vaccine 0.5 milliLiter(s) IntraMuscular once  midodrine. 20 milliGRAM(s) Oral three times a day  montelukast 10 milliGRAM(s) Oral daily  pantoprazole    Tablet 40 milliGRAM(s) Oral before breakfast  polyethylene glycol 3350 17 Gram(s) Oral two times a day  remodulin (treprostinil) SubQ infusion 1 Dose(s) 1 Dose(s) SubCutaneous Continuous Pump  senna 2 Tablet(s) Oral at bedtime    MEDICATIONS  (PRN):  acetaminophen     Tablet .. 1000 milliGRAM(s) Oral every 6 hours PRN Mild Pain (1 - 3), Moderate Pain (4 - 6)  albuterol/ipratropium for Nebulization 3 milliLiter(s) Nebulizer every 6 hours PRN Shortness of Breath  diphenhydrAMINE Injectable 25 milliGRAM(s) IV Push every 8 hours PRN Migraine  ketorolac   Injectable 30 milliGRAM(s) IV Push every 8 hours PRN Migraine  meclizine 12.5 milliGRAM(s) Oral once PRN Dizziness  melatonin 9 milliGRAM(s) Oral at bedtime PRN Insomnia  metoclopramide 10 milliGRAM(s) Oral every 8 hours PRN Migraine  oxyCODONE    IR 2.5 milliGRAM(s) Oral every 6 hours PRN Severe Pain (7 - 10)      CAPILLARY BLOOD GLUCOSE        I&O's Summary    13 Apr 2025 07:01  -  14 Apr 2025 07:00  --------------------------------------------------------  IN: 650 mL / OUT: 400 mL / NET: 250 mL        PHYSICAL EXAM:  Vital Signs Last 24 Hrs  T(C): 36.7 (14 Apr 2025 04:00), Max: 36.9 (13 Apr 2025 16:00)  T(F): 98 (14 Apr 2025 04:00), Max: 98.4 (13 Apr 2025 16:00)  HR: 83 (14 Apr 2025 07:00) (66 - 91)  BP: 107/62 (14 Apr 2025 07:00) (81/46 - 120/57)  BP(mean): 81 (14 Apr 2025 07:00) (65 - 82)  RR: 17 (14 Apr 2025 07:00) (11 - 45)  SpO2: 100% (14 Apr 2025 07:00) (92% - 100%)    Parameters below as of 13 Apr 2025 20:00  Patient On (Oxygen Delivery Method): room air        PHYSICAL EXAM:  GENERAL: NAD, lying in bed comfortably  HEENT: NC/AT  CHEST/LUNG: CTAB, no increased WOB  HEART: RRR, no m/r/g  ABDOMEN: soft, NT, ND, BS+  NERVOUS SYSTEM:  A&Ox3    LABS:                        11.0   9.61  )-----------( 337      ( 13 Apr 2025 22:36 )             34.3     04-13    137  |  103  |  11  ----------------------------<  84  4.3   |  22  |  1.06    Ca    8.5      13 Apr 2025 22:36  Phos  3.0     04-13  Mg     2.1     04-13    TPro  6.8  /  Alb  3.8  /  TBili  0.8  /  DBili  x   /  AST  11  /  ALT  7[L]  /  AlkPhos  62  04-13    PT/INR - ( 13 Apr 2025 22:36 )   PT: 13.7 sec;   INR: 1.19 ratio         PTT - ( 13 Apr 2025 22:36 )  PTT:39.6 sec      Urinalysis Basic - ( 13 Apr 2025 22:36 )    Color: x / Appearance: x / SG: x / pH: x  Gluc: 84 mg/dL / Ketone: x  / Bili: x / Urobili: x   Blood: x / Protein: x / Nitrite: x   Leuk Esterase: x / RBC: x / WBC x   Sq Epi: x / Non Sq Epi: x / Bacteria: x        Culture - Blood (collected 11 Apr 2025 16:30)  Source: Blood Blood  Preliminary Report (13 Apr 2025 22:01):    No growth at 48 Hours    Culture - Blood (collected 11 Apr 2025 16:25)  Source: Blood Blood  Preliminary Report (13 Apr 2025 22:01):    No growth at 48 Hours    Assessment and Plan:    Neurologic:  -AAOx3    #migraines  -Headache cocktail per neuro: "-First line: recommend migraine medications (to be given all together at the same time if no contraindications form comorbitities): ketorolac (Toradol) 30mg IV q8h PRN (or acetaminophen 1g IV q8h PRN), metoclopramide (Reglan) 10mg q8h PRN, diphenhydramine (Benadryl) 25mg IV q8h PRN. Please repeat at least 2-3 cycles for medications to be effective."  -Currently managed with oxycodone PRN    #peripheral vertigo  -meclizine 12.5 mg PRN     Cardiovascular:  #Hypotension:  -Hold bumetanide, spironolactone, sildenafil, and nifedipine.  -midodrine 20 mg TID.  -Heart failure signing off as repeat TTE similar to prior     #Right Heart Failure:  -Hold bumetanide, spironolactone, and nifedipine.  -Heart failure signing off as repeat TTE similar to prior   - s/p TTE  -MYRIAM on 4/16 for pulmonic valve eval pending      #Pulmonary Hypertension:  -Continue home Remodulin subQ 51 ng/kg/min.  -midline in place by IR on 4/11  -Check BNP  - s/p TTE  - MYRIAM on 4/16 without evidence of pulmonic damage   - Transplant Pulmonology following    #Chronic PE  - Xarelto dc'ed in anticipation potential I&D  - Xarelto restarted on 4/14 post surgical aspiration of LLQ collection    Pulmonary:  #Pulmonary Hypertension:  -Continue home Remodulin subQ 51 ng/kg/min.  -midline in place by IR on 4/11  -Check BNP  - s/p TTE  -MYRIAM on 4/16 for pulmonic valve eval pending  - Transplant Pulmonology following    #Asthma  Continue home ipratropium (Atrovent) nebulizer PRN.  Continue home budesonide/formoterol (Pulmicort), tiotropium (Spiriva), and PRN nebulizers.  Continue fluticasone (Flonase).  Maintain supplemental O2 2L NC, keep SpO2 > 92%, avoid hyperoxia.    #Chronic PE:  - Xarelto dc'ed in anticipation potential I&D  - Xarelto restarted on 4/14 post surgical aspiration of LLQ collection      #Lung Transplant Evaluation  Ongoing pre-lung transplant workup.  Transplant Pulm requesting LLQ abscess I&D for medical optimization for lung transplant candidacy   Seen by pulmonary transplant service.  Patient is being considered for inpatient transplant workup per Dr. Wise.  Needs cardiac MRI (requires transition to MRI-compatible pump—pulmonary transplant team working on this).  cardiac MRI on 4/14 @ 01:00 PM   -s/p cardiac MRI  - MRCP on 4/15  - MYRIAM  - CT colongraphy on 4/18 for further workup for iron deficiency anemia     /Renal:  -no active issues  -replete electrolytes as needed     GI:  #Abdominal Abscess  -linezolid 600 mg PO q12h for 10-14 days until 4/16 was dc'ed by ID and switched to daptomycin 6 mg/kg until 4/19  -Per general surgery, no acute surgical intervention at this time for abscess.   -Transplant Pulm requesting LLQ abscess I&D for medical optimization for lung transplant candidacy   -s/p LLQ collection aspiration on 4/13  -wound cultures grew pseudomonas -> added IV cefepime 4/16     #yeast infection  -diflucan 150 mg given on 4/15    #constipation  -Continue Miralax BID and senna    Hematology/Onc:  #Iron Deficiency Anemia (JULIA)  -outpatient HIE note from 03/21/25 with dx of microcytic anemia (iron deficiency vs hemoglobinopathy)  - venofer x 3 doses to end on 4/17  - f/u Hb electrophoresis   - CT colongraphy on 4/18 for further workup for iron deficiency anemia     ID:  #Abdominal Abscess  linezolid 600 mg PO q12h for 10-14 days until 4/16 was dc'ed by ID and switched to daptomycin 6 mg/kg until 4/19  wound cultures grew pseudomonas -> added IV cefepime 4/16     #penicillin allergy   -per allergy note on 4/10 from inpatient team was recommending a graded challenge, appears to not have occurred as of yet     - administer 10% of standard amoxicillin dose, then observe for 30 minutes     - if tolerated, administer remaining 90% of standard amoxicillin dose, then observe for 60 minutes  - if patient does not develop immediate reactions then is considered to be not allergic and penicillin allergy can be delabeled  -will trial after conclusion of imaging^     ************************  Amirah Rowe,    Emergency Medicine PGY-1  ************************    Patient is a 44y old  Female who presents with a chief complaint of Abdominal abscess (13 Apr 2025 07:19)    Overnight no acute events.     MEDICATIONS  (STANDING):  buDESOnide    Inhalation Suspension 0.5 milliGRAM(s) Inhalation daily  chlorhexidine 2% Cloths 1 Application(s) Topical <User Schedule>  DAPTOmycin IVPB      DAPTOmycin IVPB 600 milliGRAM(s) IV Intermittent every 24 hours  fluticasone propionate 50 MICROgram(s)/spray Nasal Spray 1 Spray(s) Both Nostrils two times a day  heparin   Injectable 5000 Unit(s) SubCutaneous every 8 hours  influenza   Vaccine 0.5 milliLiter(s) IntraMuscular once  midodrine. 20 milliGRAM(s) Oral three times a day  montelukast 10 milliGRAM(s) Oral daily  pantoprazole    Tablet 40 milliGRAM(s) Oral before breakfast  polyethylene glycol 3350 17 Gram(s) Oral two times a day  remodulin (treprostinil) SubQ infusion 1 Dose(s) 1 Dose(s) SubCutaneous Continuous Pump  senna 2 Tablet(s) Oral at bedtime    MEDICATIONS  (PRN):  acetaminophen     Tablet .. 1000 milliGRAM(s) Oral every 6 hours PRN Mild Pain (1 - 3), Moderate Pain (4 - 6)  albuterol/ipratropium for Nebulization 3 milliLiter(s) Nebulizer every 6 hours PRN Shortness of Breath  diphenhydrAMINE Injectable 25 milliGRAM(s) IV Push every 8 hours PRN Migraine  ketorolac   Injectable 30 milliGRAM(s) IV Push every 8 hours PRN Migraine  meclizine 12.5 milliGRAM(s) Oral once PRN Dizziness  melatonin 9 milliGRAM(s) Oral at bedtime PRN Insomnia  metoclopramide 10 milliGRAM(s) Oral every 8 hours PRN Migraine  oxyCODONE    IR 2.5 milliGRAM(s) Oral every 6 hours PRN Severe Pain (7 - 10)      CAPILLARY BLOOD GLUCOSE        I&O's Summary    13 Apr 2025 07:01  -  14 Apr 2025 07:00  --------------------------------------------------------  IN: 650 mL / OUT: 400 mL / NET: 250 mL        PHYSICAL EXAM:  Vital Signs Last 24 Hrs  T(C): 36.7 (14 Apr 2025 04:00), Max: 36.9 (13 Apr 2025 16:00)  T(F): 98 (14 Apr 2025 04:00), Max: 98.4 (13 Apr 2025 16:00)  HR: 83 (14 Apr 2025 07:00) (66 - 91)  BP: 107/62 (14 Apr 2025 07:00) (81/46 - 120/57)  BP(mean): 81 (14 Apr 2025 07:00) (65 - 82)  RR: 17 (14 Apr 2025 07:00) (11 - 45)  SpO2: 100% (14 Apr 2025 07:00) (92% - 100%)    Parameters below as of 13 Apr 2025 20:00  Patient On (Oxygen Delivery Method): room air        PHYSICAL EXAM:  GENERAL: NAD, lying in bed comfortably  HEENT: NC/AT  CHEST/LUNG: CTAB, no increased WOB  HEART: RRR, no m/r/g  ABDOMEN: soft, NT, ND, BS+  NERVOUS SYSTEM:  A&Ox3    LABS:                        11.0   9.61  )-----------( 337      ( 13 Apr 2025 22:36 )             34.3     04-13    137  |  103  |  11  ----------------------------<  84  4.3   |  22  |  1.06    Ca    8.5      13 Apr 2025 22:36  Phos  3.0     04-13  Mg     2.1     04-13    TPro  6.8  /  Alb  3.8  /  TBili  0.8  /  DBili  x   /  AST  11  /  ALT  7[L]  /  AlkPhos  62  04-13    PT/INR - ( 13 Apr 2025 22:36 )   PT: 13.7 sec;   INR: 1.19 ratio         PTT - ( 13 Apr 2025 22:36 )  PTT:39.6 sec      Urinalysis Basic - ( 13 Apr 2025 22:36 )    Color: x / Appearance: x / SG: x / pH: x  Gluc: 84 mg/dL / Ketone: x  / Bili: x / Urobili: x   Blood: x / Protein: x / Nitrite: x   Leuk Esterase: x / RBC: x / WBC x   Sq Epi: x / Non Sq Epi: x / Bacteria: x        Culture - Blood (collected 11 Apr 2025 16:30)  Source: Blood Blood  Preliminary Report (13 Apr 2025 22:01):    No growth at 48 Hours    Culture - Blood (collected 11 Apr 2025 16:25)  Source: Blood Blood  Preliminary Report (13 Apr 2025 22:01):    No growth at 48 Hours    Assessment and Plan:    Neurologic:  -AAOx3    #migraines  -Headache cocktail per neuro: "-First line: recommend migraine medications (to be given all together at the same time if no contraindications form comorbitities): ketorolac (Toradol) 30mg IV q8h PRN (or acetaminophen 1g IV q8h PRN), metoclopramide (Reglan) 10mg q8h PRN, diphenhydramine (Benadryl) 25mg IV q8h PRN. Please repeat at least 2-3 cycles for medications to be effective."  -Currently managed with oxycodone PRN    #peripheral vertigo  -meclizine 12.5 mg PRN     Cardiovascular:  #Hypotension:  -Hold bumetanide, spironolactone, sildenafil, and nifedipine.  -midodrine 20 mg TID held as not needed   -Heart failure signing off as repeat TTE similar to prior     #Right Heart Failure:  -Hold bumetanide, spironolactone, and nifedipine.  -Heart failure signing off as repeat TTE similar to prior   - s/p TTE  -MYRIAM on 4/16 for pulmonic valve eval pending      #Pulmonary Hypertension:  -Continue home Remodulin subQ 51 ng/kg/min.  -midline in place by IR on 4/11  -Check BNP  - s/p TTE  - MYRIAM on 4/16 without evidence of pulmonic damage   - Transplant Pulmonology following    #Chronic PE  - Xarelto dc'ed in anticipation potential I&D  - Xarelto restarted on 4/14 post surgical aspiration of LLQ collection    Pulmonary:  #Pulmonary Hypertension:  -Continue home Remodulin subQ 51 ng/kg/min.  -midline in place by IR on 4/11  -Check BNP  - s/p TTE  -MYRIAM on 4/16 for pulmonic valve eval pending  - Transplant Pulmonology following    #Asthma  Continue home ipratropium (Atrovent) nebulizer PRN.  Continue home budesonide/formoterol (Pulmicort), tiotropium (Spiriva), and PRN nebulizers.  Continue fluticasone (Flonase).  Maintain supplemental O2 2L NC, keep SpO2 > 92%, avoid hyperoxia.    #Chronic PE:  - Xarelto dc'ed in anticipation potential I&D  - Xarelto restarted on 4/14 post surgical aspiration of LLQ collection      #Lung Transplant Evaluation  Ongoing pre-lung transplant workup.  Transplant Pulm requesting LLQ abscess I&D for medical optimization for lung transplant candidacy   Seen by pulmonary transplant service.  Patient is being considered for inpatient transplant workup per Dr. Wise.  Needs cardiac MRI (requires transition to MRI-compatible pump—pulmonary transplant team working on this).  cardiac MRI on 4/14 @ 01:00 PM   -s/p cardiac MRI  - MRCP on 4/15  - MYRIAM  - CT colongraphy on 4/18 for further workup for iron deficiency anemia     /Renal:  -no active issues  -replete electrolytes as needed     GI:  #Abdominal Abscess  -linezolid 600 mg PO q12h for 10-14 days until 4/16 was dc'ed by ID and switched to daptomycin 6 mg/kg until 4/19  -Per general surgery, no acute surgical intervention at this time for abscess.   -Transplant Pulm requesting LLQ abscess I&D for medical optimization for lung transplant candidacy   -s/p LLQ collection aspiration on 4/13  -wound cultures grew pseudomonas -> added IV cefepime 4/16 end on 4/21    #yeast infection  -diflucan 150 mg given on 4/15    #weight loss   -on ozempic  -can restart    Hematology/Onc:  #Iron Deficiency Anemia (JULIA)  -outpatient HIE note from 03/21/25 with dx of microcytic anemia (iron deficiency vs hemoglobinopathy)  - venofer x 3 doses to end on 4/17  - f/u Hb electrophoresis   - CT colongraphy on 4/18 for further workup for iron deficiency anemia     ID:  #Abdominal Abscess  linezolid 600 mg PO q12h for 10-14 days until 4/16 was dc'ed by ID and switched to daptomycin 6 mg/kg until 4/19  wound cultures grew pseudomonas -> added IV cefepime 4/16 end on 4/21    #penicillin allergy   -per allergy note on 4/10 from inpatient team was recommending a graded challenge, appears to not have occurred as of yet     - administer 10% of standard amoxicillin dose, then observe for 30 minutes     - if tolerated, administer remaining 90% of standard amoxicillin dose, then observe for 60 minutes  - if patient does not develop immediate reactions then is considered to be not allergic and penicillin allergy can be delabeled  -will trial after conclusion of imaging on 4/18^    SKIN:  #pedunculated mass on L anterior thigh  - evaluated by derm on 10/2024, no concern for malignancy, was told to follow up outpatient for removal   - plastics consulted

## 2025-04-19 LAB
ALBUMIN SERPL ELPH-MCNC: 3.4 G/DL — SIGNIFICANT CHANGE UP (ref 3.3–5)
ALP SERPL-CCNC: 51 U/L — SIGNIFICANT CHANGE UP (ref 40–120)
ALT FLD-CCNC: 11 U/L — SIGNIFICANT CHANGE UP (ref 10–45)
ANION GAP SERPL CALC-SCNC: 10 MMOL/L — SIGNIFICANT CHANGE UP (ref 5–17)
APTT BLD: 50.9 SEC — HIGH (ref 24.5–35.6)
AST SERPL-CCNC: 15 U/L — SIGNIFICANT CHANGE UP (ref 10–40)
BILIRUB SERPL-MCNC: 0.3 MG/DL — SIGNIFICANT CHANGE UP (ref 0.2–1.2)
BUN SERPL-MCNC: 6 MG/DL — LOW (ref 7–23)
CALCIUM SERPL-MCNC: 7.6 MG/DL — LOW (ref 8.4–10.5)
CHLORIDE SERPL-SCNC: 110 MMOL/L — HIGH (ref 96–108)
CO2 SERPL-SCNC: 17 MMOL/L — LOW (ref 22–31)
CREAT SERPL-MCNC: 0.91 MG/DL — SIGNIFICANT CHANGE UP (ref 0.5–1.3)
EGFR: 80 ML/MIN/1.73M2 — SIGNIFICANT CHANGE UP
EGFR: 80 ML/MIN/1.73M2 — SIGNIFICANT CHANGE UP
GLUCOSE SERPL-MCNC: 89 MG/DL — SIGNIFICANT CHANGE UP (ref 70–99)
HCT VFR BLD CALC: 29.9 % — LOW (ref 34.5–45)
HGB BLD-MCNC: 9.3 G/DL — LOW (ref 11.5–15.5)
INR BLD: 1.95 RATIO — HIGH (ref 0.85–1.16)
MAGNESIUM SERPL-MCNC: 1.9 MG/DL — SIGNIFICANT CHANGE UP (ref 1.6–2.6)
MCHC RBC-ENTMCNC: 22.6 PG — LOW (ref 27–34)
MCHC RBC-ENTMCNC: 31.1 G/DL — LOW (ref 32–36)
MCV RBC AUTO: 72.6 FL — LOW (ref 80–100)
NRBC BLD AUTO-RTO: 0 /100 WBCS — SIGNIFICANT CHANGE UP (ref 0–0)
PHOSPHATE SERPL-MCNC: 2.4 MG/DL — LOW (ref 2.5–4.5)
PLATELET # BLD AUTO: 193 K/UL — SIGNIFICANT CHANGE UP (ref 150–400)
POTASSIUM SERPL-MCNC: 3.9 MMOL/L — SIGNIFICANT CHANGE UP (ref 3.5–5.3)
POTASSIUM SERPL-SCNC: 3.9 MMOL/L — SIGNIFICANT CHANGE UP (ref 3.5–5.3)
PROT SERPL-MCNC: 5.9 G/DL — LOW (ref 6–8.3)
PROTHROM AB SERPL-ACNC: 22.3 SEC — HIGH (ref 9.9–13.4)
RBC # BLD: 4.12 M/UL — SIGNIFICANT CHANGE UP (ref 3.8–5.2)
RBC # FLD: 18.2 % — HIGH (ref 10.3–14.5)
SODIUM SERPL-SCNC: 137 MMOL/L — SIGNIFICANT CHANGE UP (ref 135–145)
WBC # BLD: 5.79 K/UL — SIGNIFICANT CHANGE UP (ref 3.8–10.5)
WBC # FLD AUTO: 5.79 K/UL — SIGNIFICANT CHANGE UP (ref 3.8–10.5)

## 2025-04-19 PROCEDURE — 99232 SBSQ HOSP IP/OBS MODERATE 35: CPT | Mod: GC

## 2025-04-19 RX ORDER — KETOROLAC TROMETHAMINE 30 MG/ML
30 INJECTION, SOLUTION INTRAMUSCULAR; INTRAVENOUS EVERY 8 HOURS
Refills: 0 | Status: DISCONTINUED | OUTPATIENT
Start: 2025-04-19 | End: 2025-04-19

## 2025-04-19 RX ORDER — HYDROCORTISONE 10 MG/G
1 CREAM TOPICAL ONCE
Refills: 0 | Status: COMPLETED | OUTPATIENT
Start: 2025-04-19 | End: 2025-04-19

## 2025-04-19 RX ORDER — KETOROLAC TROMETHAMINE 30 MG/ML
30 INJECTION, SOLUTION INTRAMUSCULAR; INTRAVENOUS ONCE
Refills: 0 | Status: DISCONTINUED | OUTPATIENT
Start: 2025-04-19 | End: 2025-04-19

## 2025-04-19 RX ADMIN — Medication 10 MILLIGRAM(S): at 06:00

## 2025-04-19 RX ADMIN — FLUTICASONE PROPIONATE 1 SPRAY(S): 50 SPRAY, METERED NASAL at 18:24

## 2025-04-19 RX ADMIN — KETOROLAC TROMETHAMINE 30 MILLIGRAM(S): 30 INJECTION, SOLUTION INTRAMUSCULAR; INTRAVENOUS at 21:59

## 2025-04-19 RX ADMIN — KETOROLAC TROMETHAMINE 15 MILLIGRAM(S): 30 INJECTION, SOLUTION INTRAMUSCULAR; INTRAVENOUS at 06:00

## 2025-04-19 RX ADMIN — CEFEPIME 100 MILLIGRAM(S): 2 INJECTION, POWDER, FOR SOLUTION INTRAVENOUS at 13:30

## 2025-04-19 RX ADMIN — Medication 1 APPLICATION(S): at 05:11

## 2025-04-19 RX ADMIN — HYDROCORTISONE 1 APPLICATION(S): 10 CREAM TOPICAL at 18:24

## 2025-04-19 RX ADMIN — KETOROLAC TROMETHAMINE 15 MILLIGRAM(S): 30 INJECTION, SOLUTION INTRAMUSCULAR; INTRAVENOUS at 06:30

## 2025-04-19 RX ADMIN — KETOROLAC TROMETHAMINE 15 MILLIGRAM(S): 30 INJECTION, SOLUTION INTRAMUSCULAR; INTRAVENOUS at 14:01

## 2025-04-19 RX ADMIN — Medication 50 MILLIGRAM(S): at 06:00

## 2025-04-19 RX ADMIN — KETOROLAC TROMETHAMINE 30 MILLIGRAM(S): 30 INJECTION, SOLUTION INTRAMUSCULAR; INTRAVENOUS at 22:30

## 2025-04-19 RX ADMIN — Medication 9 MILLIGRAM(S): at 21:59

## 2025-04-19 RX ADMIN — Medication 40 MILLIEQUIVALENT(S): at 05:10

## 2025-04-19 RX ADMIN — Medication 40 MILLIGRAM(S): at 05:10

## 2025-04-19 RX ADMIN — MONTELUKAST SODIUM 10 MILLIGRAM(S): 10 TABLET ORAL at 11:42

## 2025-04-19 RX ADMIN — RIVAROXABAN 15 MILLIGRAM(S): 10 TABLET, FILM COATED ORAL at 18:24

## 2025-04-19 RX ADMIN — CEFEPIME 100 MILLIGRAM(S): 2 INJECTION, POWDER, FOR SOLUTION INTRAVENOUS at 05:11

## 2025-04-19 RX ADMIN — CEFEPIME 100 MILLIGRAM(S): 2 INJECTION, POWDER, FOR SOLUTION INTRAVENOUS at 21:59

## 2025-04-19 RX ADMIN — KETOROLAC TROMETHAMINE 15 MILLIGRAM(S): 30 INJECTION, SOLUTION INTRAMUSCULAR; INTRAVENOUS at 14:15

## 2025-04-19 RX ADMIN — Medication 500 MICROGRAM(S): at 11:21

## 2025-04-19 RX ADMIN — FLUTICASONE PROPIONATE 1 SPRAY(S): 50 SPRAY, METERED NASAL at 05:11

## 2025-04-19 NOTE — PROGRESS NOTE ADULT - ATTENDING COMMENTS
1. Pulmonary HTN. Continue Remodulin SQ.   2.ID. Continue cefepime and Daptomycin for superficial abd abscesses  3.Asthma: Continue inhaled steroids and bronchodilators.  4. Migraine headaches. Continue headache regimen prn  5. Lung transplant work up. For CT colonoscopy today  6.DVT prophylaxis: Continue Xarelto  7. H/O  PE.  Continue Xarelto  8. GOC. Full code

## 2025-04-19 NOTE — CONSULT NOTE ADULT - PROVIDER SPECIALTY LIST ADULT
Plastic Surgery
Transplant ID
ENT
Surgery
Transplant Pulmonology
Intervent Radiology
Neurology
Pulmonology
Heart Failure
Allergy/Immunology

## 2025-04-19 NOTE — CONSULT NOTE ADULT - SUBJECTIVE AND OBJECTIVE BOX
See dictated note.  Left thigh mass likely benign, however, at risk of sudden torsion and/or avulsion and gangrene and subsequent infx due to its' heavy nature and pedunculated stalk.  Pt desires to have it removed during this hospitalization and likely can be performed at bedside.  Options/alts/risks/cxs discussed.  [facility] providers, please call me directly to discuss and advise re: safety of performing the bedside procedure ASAP.  Thank you.  Cell [phone].

## 2025-04-19 NOTE — PROGRESS NOTE ADULT - ASSESSMENT
************************  Amirah Rowe,    Emergency Medicine PGY-1  ************************    Patient is a 44y old  Female who presents with a chief complaint of Abdominal abscess (13 Apr 2025 07:19)    Overnight no acute events.     MEDICATIONS  (STANDING):  buDESOnide    Inhalation Suspension 0.5 milliGRAM(s) Inhalation daily  chlorhexidine 2% Cloths 1 Application(s) Topical <User Schedule>  DAPTOmycin IVPB      DAPTOmycin IVPB 600 milliGRAM(s) IV Intermittent every 24 hours  fluticasone propionate 50 MICROgram(s)/spray Nasal Spray 1 Spray(s) Both Nostrils two times a day  heparin   Injectable 5000 Unit(s) SubCutaneous every 8 hours  influenza   Vaccine 0.5 milliLiter(s) IntraMuscular once  midodrine. 20 milliGRAM(s) Oral three times a day  montelukast 10 milliGRAM(s) Oral daily  pantoprazole    Tablet 40 milliGRAM(s) Oral before breakfast  polyethylene glycol 3350 17 Gram(s) Oral two times a day  remodulin (treprostinil) SubQ infusion 1 Dose(s) 1 Dose(s) SubCutaneous Continuous Pump  senna 2 Tablet(s) Oral at bedtime    MEDICATIONS  (PRN):  acetaminophen     Tablet .. 1000 milliGRAM(s) Oral every 6 hours PRN Mild Pain (1 - 3), Moderate Pain (4 - 6)  albuterol/ipratropium for Nebulization 3 milliLiter(s) Nebulizer every 6 hours PRN Shortness of Breath  diphenhydrAMINE Injectable 25 milliGRAM(s) IV Push every 8 hours PRN Migraine  ketorolac   Injectable 30 milliGRAM(s) IV Push every 8 hours PRN Migraine  meclizine 12.5 milliGRAM(s) Oral once PRN Dizziness  melatonin 9 milliGRAM(s) Oral at bedtime PRN Insomnia  metoclopramide 10 milliGRAM(s) Oral every 8 hours PRN Migraine  oxyCODONE    IR 2.5 milliGRAM(s) Oral every 6 hours PRN Severe Pain (7 - 10)      CAPILLARY BLOOD GLUCOSE        I&O's Summary    13 Apr 2025 07:01  -  14 Apr 2025 07:00  --------------------------------------------------------  IN: 650 mL / OUT: 400 mL / NET: 250 mL        PHYSICAL EXAM:  Vital Signs Last 24 Hrs  T(C): 36.7 (14 Apr 2025 04:00), Max: 36.9 (13 Apr 2025 16:00)  T(F): 98 (14 Apr 2025 04:00), Max: 98.4 (13 Apr 2025 16:00)  HR: 83 (14 Apr 2025 07:00) (66 - 91)  BP: 107/62 (14 Apr 2025 07:00) (81/46 - 120/57)  BP(mean): 81 (14 Apr 2025 07:00) (65 - 82)  RR: 17 (14 Apr 2025 07:00) (11 - 45)  SpO2: 100% (14 Apr 2025 07:00) (92% - 100%)    Parameters below as of 13 Apr 2025 20:00  Patient On (Oxygen Delivery Method): room air        PHYSICAL EXAM:  GENERAL: NAD, lying in bed comfortably  HEENT: NC/AT  CHEST/LUNG: CTAB, no increased WOB  HEART: RRR, no m/r/g  ABDOMEN: soft, NT, ND, BS+  NERVOUS SYSTEM:  A&Ox3    LABS:                        11.0   9.61  )-----------( 337      ( 13 Apr 2025 22:36 )             34.3     04-13    137  |  103  |  11  ----------------------------<  84  4.3   |  22  |  1.06    Ca    8.5      13 Apr 2025 22:36  Phos  3.0     04-13  Mg     2.1     04-13    TPro  6.8  /  Alb  3.8  /  TBili  0.8  /  DBili  x   /  AST  11  /  ALT  7[L]  /  AlkPhos  62  04-13    PT/INR - ( 13 Apr 2025 22:36 )   PT: 13.7 sec;   INR: 1.19 ratio         PTT - ( 13 Apr 2025 22:36 )  PTT:39.6 sec      Urinalysis Basic - ( 13 Apr 2025 22:36 )    Color: x / Appearance: x / SG: x / pH: x  Gluc: 84 mg/dL / Ketone: x  / Bili: x / Urobili: x   Blood: x / Protein: x / Nitrite: x   Leuk Esterase: x / RBC: x / WBC x   Sq Epi: x / Non Sq Epi: x / Bacteria: x        Culture - Blood (collected 11 Apr 2025 16:30)  Source: Blood Blood  Preliminary Report (13 Apr 2025 22:01):    No growth at 48 Hours    Culture - Blood (collected 11 Apr 2025 16:25)  Source: Blood Blood  Preliminary Report (13 Apr 2025 22:01):    No growth at 48 Hours    Assessment and Plan:    Neurologic:  -AAOx3    #migraines  -Headache cocktail per neuro: "-First line: recommend migraine medications (to be given all together at the same time if no contraindications form comorbitities): ketorolac (Toradol) 30mg IV q8h PRN (or acetaminophen 1g IV q8h PRN), metoclopramide (Reglan) 10mg q8h PRN, diphenhydramine (Benadryl) 25mg IV q8h PRN. Please repeat at least 2-3 cycles for medications to be effective."  -Currently managed with oxycodone PRN    #peripheral vertigo  -meclizine 12.5 mg PRN     Cardiovascular:  #Hypotension:  -Hold bumetanide, spironolactone, sildenafil, and nifedipine.  -midodrine 20 mg TID held as not needed   -Heart failure signing off as repeat TTE similar to prior     #Right Heart Failure:  -Hold bumetanide, spironolactone, and nifedipine.  -Heart failure signing off as repeat TTE similar to prior   - s/p TTE  -MYRIAM on 4/16 for pulmonic valve eval pending      #Pulmonary Hypertension:  -Continue home Remodulin subQ 51 ng/kg/min.  -midline in place by IR on 4/11  -Check BNP  - s/p TTE  - MYRIAM on 4/16 without evidence of pulmonic damage   - Transplant Pulmonology following    #Chronic PE  - Xarelto dc'ed in anticipation potential I&D  - Xarelto restarted on 4/14 post surgical aspiration of LLQ collection    Pulmonary:  #Pulmonary Hypertension:  -Continue home Remodulin subQ 51 ng/kg/min.  -midline in place by IR on 4/11  -Check BNP  - s/p TTE  -MYRIAM on 4/16 for pulmonic valve eval pending  - Transplant Pulmonology following    #Asthma  Continue home ipratropium (Atrovent) nebulizer PRN.  Continue home budesonide/formoterol (Pulmicort), tiotropium (Spiriva), and PRN nebulizers.  Continue fluticasone (Flonase).  Maintain supplemental O2 2L NC, keep SpO2 > 92%, avoid hyperoxia.    #Chronic PE:  - Xarelto dc'ed in anticipation potential I&D  - Xarelto restarted on 4/14 post surgical aspiration of LLQ collection      #Lung Transplant Evaluation  Ongoing pre-lung transplant workup.  Transplant Pulm requesting LLQ abscess I&D for medical optimization for lung transplant candidacy   Seen by pulmonary transplant service.  Patient is being considered for inpatient transplant workup per Dr. Wise.  Needs cardiac MRI (requires transition to MRI-compatible pump—pulmonary transplant team working on this).  cardiac MRI on 4/14 @ 01:00 PM   -s/p cardiac MRI  - MRCP on 4/15  - MYRIAM  - CT colongraphy on 4/18 for further workup for iron deficiency anemia     /Renal:  -no active issues  -replete electrolytes as needed     GI:  #Abdominal Abscess  -linezolid 600 mg PO q12h for 10-14 days until 4/16 was dc'ed by ID and switched to daptomycin 6 mg/kg until 4/19  -Per general surgery, no acute surgical intervention at this time for abscess.   -Transplant Pulm requesting LLQ abscess I&D for medical optimization for lung transplant candidacy   -s/p LLQ collection aspiration on 4/13  -wound cultures grew pseudomonas -> added IV cefepime 4/16 end on 4/21    #yeast infection  -diflucan 150 mg given on 4/15    #weight loss   -on ozempic  -can restart    Hematology/Onc:  #Iron Deficiency Anemia (JULIA)  -outpatient HIE note from 03/21/25 with dx of microcytic anemia (iron deficiency vs hemoglobinopathy)  - venofer x 3 doses to end on 4/17  - f/u Hb electrophoresis   - CT colongraphy on 4/18 for further workup for iron deficiency anemia     ID:  #Abdominal Abscess  linezolid 600 mg PO q12h for 10-14 days until 4/16 was dc'ed by ID and switched to daptomycin 6 mg/kg until 4/19  wound cultures grew pseudomonas -> added IV cefepime 4/16 end on 4/21    #penicillin allergy   -per allergy note on 4/10 from inpatient team was recommending a graded challenge, appears to not have occurred as of yet     - administer 10% of standard amoxicillin dose, then observe for 30 minutes     - if tolerated, administer remaining 90% of standard amoxicillin dose, then observe for 60 minutes  - if patient does not develop immediate reactions then is considered to be not allergic and penicillin allergy can be delabeled  -will trial after conclusion of imaging on 4/18^    SKIN:  #pedunculated mass on L anterior thigh  - evaluated by derm on 10/2024, no concern for malignancy, was told to follow up outpatient for removal   - plastics consulted    ************************  Amirah Rowe,    Emergency Medicine PGY-1  ************************    Patient is a 44y old  Female who presents with a chief complaint of Abdominal abscess (13 Apr 2025 07:19)    Overnight no acute events.     MEDICATIONS  (STANDING):  buDESOnide    Inhalation Suspension 0.5 milliGRAM(s) Inhalation daily  chlorhexidine 2% Cloths 1 Application(s) Topical <User Schedule>  DAPTOmycin IVPB      DAPTOmycin IVPB 600 milliGRAM(s) IV Intermittent every 24 hours  fluticasone propionate 50 MICROgram(s)/spray Nasal Spray 1 Spray(s) Both Nostrils two times a day  heparin   Injectable 5000 Unit(s) SubCutaneous every 8 hours  influenza   Vaccine 0.5 milliLiter(s) IntraMuscular once  midodrine. 20 milliGRAM(s) Oral three times a day  montelukast 10 milliGRAM(s) Oral daily  pantoprazole    Tablet 40 milliGRAM(s) Oral before breakfast  polyethylene glycol 3350 17 Gram(s) Oral two times a day  remodulin (treprostinil) SubQ infusion 1 Dose(s) 1 Dose(s) SubCutaneous Continuous Pump  senna 2 Tablet(s) Oral at bedtime    MEDICATIONS  (PRN):  acetaminophen     Tablet .. 1000 milliGRAM(s) Oral every 6 hours PRN Mild Pain (1 - 3), Moderate Pain (4 - 6)  albuterol/ipratropium for Nebulization 3 milliLiter(s) Nebulizer every 6 hours PRN Shortness of Breath  diphenhydrAMINE Injectable 25 milliGRAM(s) IV Push every 8 hours PRN Migraine  ketorolac   Injectable 30 milliGRAM(s) IV Push every 8 hours PRN Migraine  meclizine 12.5 milliGRAM(s) Oral once PRN Dizziness  melatonin 9 milliGRAM(s) Oral at bedtime PRN Insomnia  metoclopramide 10 milliGRAM(s) Oral every 8 hours PRN Migraine  oxyCODONE    IR 2.5 milliGRAM(s) Oral every 6 hours PRN Severe Pain (7 - 10)      CAPILLARY BLOOD GLUCOSE        I&O's Summary    13 Apr 2025 07:01  -  14 Apr 2025 07:00  --------------------------------------------------------  IN: 650 mL / OUT: 400 mL / NET: 250 mL        PHYSICAL EXAM:  Vital Signs Last 24 Hrs  T(C): 36.7 (14 Apr 2025 04:00), Max: 36.9 (13 Apr 2025 16:00)  T(F): 98 (14 Apr 2025 04:00), Max: 98.4 (13 Apr 2025 16:00)  HR: 83 (14 Apr 2025 07:00) (66 - 91)  BP: 107/62 (14 Apr 2025 07:00) (81/46 - 120/57)  BP(mean): 81 (14 Apr 2025 07:00) (65 - 82)  RR: 17 (14 Apr 2025 07:00) (11 - 45)  SpO2: 100% (14 Apr 2025 07:00) (92% - 100%)    Parameters below as of 13 Apr 2025 20:00  Patient On (Oxygen Delivery Method): room air        PHYSICAL EXAM:  GENERAL: NAD, lying in bed comfortably  HEENT: NC/AT  CHEST/LUNG: CTAB, no increased WOB  HEART: RRR, no m/r/g  ABDOMEN: soft, NT, ND, BS+  NERVOUS SYSTEM:  A&Ox3    LABS:                        11.0   9.61  )-----------( 337      ( 13 Apr 2025 22:36 )             34.3     04-13    137  |  103  |  11  ----------------------------<  84  4.3   |  22  |  1.06    Ca    8.5      13 Apr 2025 22:36  Phos  3.0     04-13  Mg     2.1     04-13    TPro  6.8  /  Alb  3.8  /  TBili  0.8  /  DBili  x   /  AST  11  /  ALT  7[L]  /  AlkPhos  62  04-13    PT/INR - ( 13 Apr 2025 22:36 )   PT: 13.7 sec;   INR: 1.19 ratio         PTT - ( 13 Apr 2025 22:36 )  PTT:39.6 sec      Urinalysis Basic - ( 13 Apr 2025 22:36 )    Color: x / Appearance: x / SG: x / pH: x  Gluc: 84 mg/dL / Ketone: x  / Bili: x / Urobili: x   Blood: x / Protein: x / Nitrite: x   Leuk Esterase: x / RBC: x / WBC x   Sq Epi: x / Non Sq Epi: x / Bacteria: x        Culture - Blood (collected 11 Apr 2025 16:30)  Source: Blood Blood  Preliminary Report (13 Apr 2025 22:01):    No growth at 48 Hours    Culture - Blood (collected 11 Apr 2025 16:25)  Source: Blood Blood  Preliminary Report (13 Apr 2025 22:01):    No growth at 48 Hours    Assessment and Plan:    Neurologic:  -AAOx3    #migraines  -Headache cocktail per neuro: "-First line: recommend migraine medications (to be given all together at the same time if no contraindications form comorbitities): ketorolac (Toradol) 30mg IV q8h PRN (or acetaminophen 1g IV q8h PRN), metoclopramide (Reglan) 10mg q8h PRN  -hold all benadryl until after allergy testing on 4/22    #peripheral vertigo  -meclizine 12.5 mg PRN     Cardiovascular:  #Hypotension:  -Hold bumetanide, spironolactone, sildenafil, and nifedipine.  -midodrine 20 mg TID held as not needed   -Heart failure signing off as repeat TTE similar to prior     #Right Heart Failure:  -Hold bumetanide, spironolactone, and nifedipine.  -Heart failure signing off as repeat TTE similar to prior   - s/p TTE  -MYRIAM on 4/16 for pulmonic valve eval pending      #Pulmonary Hypertension:  -Continue home Remodulin subQ 51 ng/kg/min.  -midline in place by IR on 4/11  -Check BNP  - s/p TTE  - MYRIAM on 4/16 without evidence of pulmonic damage   - Transplant Pulmonology following    #Chronic PE  - Xarelto dc'ed in anticipation potential I&D  - Xarelto restarted on 4/14 post surgical aspiration of LLQ collection    Pulmonary:  #Pulmonary Hypertension:  -Continue home Remodulin subQ 51 ng/kg/min.  -midline in place by IR on 4/11  -Check BNP  - s/p TTE  -MYRIAM on 4/16 for pulmonic valve eval pending  - Transplant Pulmonology following    #Asthma  Continue home ipratropium (Atrovent) nebulizer PRN.  Continue home budesonide/formoterol (Pulmicort), tiotropium (Spiriva), and PRN nebulizers.  Continue fluticasone (Flonase).  Maintain supplemental O2 2L NC, keep SpO2 > 92%, avoid hyperoxia.    #Chronic PE:  - Xarelto dc'ed in anticipation potential I&D  - Xarelto restarted on 4/14 post surgical aspiration of LLQ collection      #Lung Transplant Evaluation  Ongoing pre-lung transplant workup.  Transplant Pulm requesting LLQ abscess I&D for medical optimization for lung transplant candidacy   Seen by pulmonary transplant service.  Patient is being considered for inpatient transplant workup per Dr. Wise.  Needs cardiac MRI (requires transition to MRI-compatible pump—pulmonary transplant team working on this).  cardiac MRI on 4/14 @ 01:00 PM   -s/p cardiac MRI  - MRCP on 4/15  - MYRIAM  - CT colongraphy on 4/18 for further workup for iron deficiency anemia   - no evidence of polyps on CT colonography      /Renal:  -no active issues  -replete electrolytes as needed     GI:  #Abdominal Abscess  -linezolid 600 mg PO q12h for 10-14 days until 4/16 was dc'ed by ID and switched to daptomycin 6 mg/kg until 4/19  -Per general surgery, no acute surgical intervention at this time for abscess.   -Transplant Pulm requesting LLQ abscess I&D for medical optimization for lung transplant candidacy   -s/p LLQ collection aspiration on 4/13  -wound cultures grew pseudomonas -> added IV cefepime 4/16 end on 4/21    #yeast infection  -diflucan 150 mg given on 4/15    #weight loss   -on ozempic  -can restart    Hematology/Onc:  #Iron Deficiency Anemia (JULIA)  -outpatient HIE note from 03/21/25 with dx of microcytic anemia (iron deficiency vs hemoglobinopathy)  - venofer x 3 doses to end on 4/17  - f/u Hb electrophoresis   - CT colongraphy on 4/18 for further workup for iron deficiency anemia   - no evidence of polyps on CT colonography    ID:  #Abdominal Abscess  linezolid 600 mg PO q12h for 10-14 days until 4/16 was dc'ed by ID and switched to daptomycin 6 mg/kg until 4/19  wound cultures grew pseudomonas -> added IV cefepime 4/16 end on 4/21    #penicillin allergy   -per allergy note on 4/10 from inpatient team was recommending a graded challenge, appears to not have occurred as of yet     - administer 10% of standard amoxicillin dose, then observe for 30 minutes     - if tolerated, administer remaining 90% of standard amoxicillin dose, then observe for 60 minutes  - if patient does not develop immediate reactions then is considered to be not allergic and penicillin allergy can be delabeled  - Reach out to allergy fellow with results as they said they would also drop a chart note with results  -Testing can happen on 9/22 after 6 am as pt was last given benadryl on 9/16 @ 6 am as part of her headache cocktail  -Hold all antihistamines until after allergy testing  -topical corticosteroids are okay for itching in the interterm       SKIN:  #pedunculated mass on L anterior thigh  - evaluated by derm on 10/2024, no concern for malignancy, was told to follow up outpatient for removal   - plastics consulted

## 2025-04-20 PROCEDURE — 99233 SBSQ HOSP IP/OBS HIGH 50: CPT | Mod: GC

## 2025-04-20 PROCEDURE — 88305 TISSUE EXAM BY PATHOLOGIST: CPT | Mod: 26

## 2025-04-20 RX ORDER — KETOROLAC TROMETHAMINE 30 MG/ML
30 INJECTION, SOLUTION INTRAMUSCULAR; INTRAVENOUS EVERY 8 HOURS
Refills: 0 | Status: DISCONTINUED | OUTPATIENT
Start: 2025-04-20 | End: 2025-04-24

## 2025-04-20 RX ORDER — ACETAMINOPHEN 500 MG/5ML
1000 LIQUID (ML) ORAL ONCE
Refills: 0 | Status: COMPLETED | OUTPATIENT
Start: 2025-04-20 | End: 2025-04-20

## 2025-04-20 RX ORDER — ACETAMINOPHEN 500 MG/5ML
1000 LIQUID (ML) ORAL EVERY 6 HOURS
Refills: 0 | Status: DISCONTINUED | OUTPATIENT
Start: 2025-04-20 | End: 2025-04-24

## 2025-04-20 RX ORDER — KETOROLAC TROMETHAMINE 30 MG/ML
30 INJECTION, SOLUTION INTRAMUSCULAR; INTRAVENOUS ONCE
Refills: 0 | Status: DISCONTINUED | OUTPATIENT
Start: 2025-04-20 | End: 2025-04-20

## 2025-04-20 RX ORDER — POTASSIUM PHOSPHATE, MONOBASIC POTASSIUM PHOSPHATE, DIBASIC INJECTION, 236; 224 MG/ML; MG/ML
15 SOLUTION, CONCENTRATE INTRAVENOUS ONCE
Refills: 0 | Status: COMPLETED | OUTPATIENT
Start: 2025-04-20 | End: 2025-04-20

## 2025-04-20 RX ORDER — ACETAMINOPHEN 500 MG/5ML
650 LIQUID (ML) ORAL ONCE
Refills: 0 | Status: COMPLETED | OUTPATIENT
Start: 2025-04-20 | End: 2025-04-20

## 2025-04-20 RX ADMIN — KETOROLAC TROMETHAMINE 30 MILLIGRAM(S): 30 INJECTION, SOLUTION INTRAMUSCULAR; INTRAVENOUS at 19:01

## 2025-04-20 RX ADMIN — MONTELUKAST SODIUM 10 MILLIGRAM(S): 10 TABLET ORAL at 11:27

## 2025-04-20 RX ADMIN — CEFEPIME 100 MILLIGRAM(S): 2 INJECTION, POWDER, FOR SOLUTION INTRAVENOUS at 14:50

## 2025-04-20 RX ADMIN — Medication 9 MILLIGRAM(S): at 21:28

## 2025-04-20 RX ADMIN — Medication 500 MICROGRAM(S): at 23:10

## 2025-04-20 RX ADMIN — Medication 40 MILLIGRAM(S): at 05:12

## 2025-04-20 RX ADMIN — Medication 650 MILLIGRAM(S): at 21:28

## 2025-04-20 RX ADMIN — Medication 1000 MILLIGRAM(S): at 11:57

## 2025-04-20 RX ADMIN — FLUTICASONE PROPIONATE 1 SPRAY(S): 50 SPRAY, METERED NASAL at 05:13

## 2025-04-20 RX ADMIN — Medication 1 APPLICATION(S): at 05:12

## 2025-04-20 RX ADMIN — FLUTICASONE PROPIONATE 1 SPRAY(S): 50 SPRAY, METERED NASAL at 17:40

## 2025-04-20 RX ADMIN — KETOROLAC TROMETHAMINE 30 MILLIGRAM(S): 30 INJECTION, SOLUTION INTRAMUSCULAR; INTRAVENOUS at 18:31

## 2025-04-20 RX ADMIN — POTASSIUM PHOSPHATE, MONOBASIC POTASSIUM PHOSPHATE, DIBASIC INJECTION, 62.5 MILLIMOLE(S): 236; 224 SOLUTION, CONCENTRATE INTRAVENOUS at 05:11

## 2025-04-20 RX ADMIN — KETOROLAC TROMETHAMINE 30 MILLIGRAM(S): 30 INJECTION, SOLUTION INTRAMUSCULAR; INTRAVENOUS at 06:05

## 2025-04-20 RX ADMIN — RIVAROXABAN 15 MILLIGRAM(S): 10 TABLET, FILM COATED ORAL at 17:40

## 2025-04-20 RX ADMIN — CEFEPIME 100 MILLIGRAM(S): 2 INJECTION, POWDER, FOR SOLUTION INTRAVENOUS at 05:12

## 2025-04-20 RX ADMIN — Medication 650 MILLIGRAM(S): at 22:16

## 2025-04-20 RX ADMIN — Medication 500 MICROGRAM(S): at 11:33

## 2025-04-20 RX ADMIN — Medication 400 MILLIGRAM(S): at 11:27

## 2025-04-20 RX ADMIN — CEFEPIME 100 MILLIGRAM(S): 2 INJECTION, POWDER, FOR SOLUTION INTRAVENOUS at 21:28

## 2025-04-20 NOTE — PROGRESS NOTE ADULT - ATTENDING COMMENTS
1. Pulmonary HTN. Continue Remodulin SQ.   2.ID. Continue cefepime and Daptomycin for superficial abd abscesses  3.Asthma: Continue inhaled steroids and bronchodilators.  4. Migraine headaches. Continue headache regimen except take out benadryl prn  5. Lung transplant work up. CT colonoscopy  wnl.  6.DVT prophylaxis: Continue Xarelto  7. H/O  PE.  Continue Xarelto  8. GOC. Full code  9. ? Allergy to penicillin. For allergy testing as per protocol given by Allergy once off benadryl for 48-72 hours. Tomorrow afternoon or Tuesday.   10. Removal of large skin tag by Plastics today.

## 2025-04-20 NOTE — PROGRESS NOTE ADULT - ASSESSMENT
Patient is a 44y old  Female who presents with a chief complaint of Abdominal abscess     Overnight no acute events.     MEDICATIONS  (STANDING):  buDESOnide    Inhalation Suspension 0.5 milliGRAM(s) Inhalation daily  chlorhexidine 2% Cloths 1 Application(s) Topical <User Schedule>  DAPTOmycin IVPB      DAPTOmycin IVPB 600 milliGRAM(s) IV Intermittent every 24 hours  fluticasone propionate 50 MICROgram(s)/spray Nasal Spray 1 Spray(s) Both Nostrils two times a day  heparin   Injectable 5000 Unit(s) SubCutaneous every 8 hours  influenza   Vaccine 0.5 milliLiter(s) IntraMuscular once  midodrine. 20 milliGRAM(s) Oral three times a day  montelukast 10 milliGRAM(s) Oral daily  pantoprazole    Tablet 40 milliGRAM(s) Oral before breakfast  polyethylene glycol 3350 17 Gram(s) Oral two times a day  remodulin (treprostinil) SubQ infusion 1 Dose(s) 1 Dose(s) SubCutaneous Continuous Pump  senna 2 Tablet(s) Oral at bedtime    MEDICATIONS  (PRN):  acetaminophen     Tablet .. 1000 milliGRAM(s) Oral every 6 hours PRN Mild Pain (1 - 3), Moderate Pain (4 - 6)  albuterol/ipratropium for Nebulization 3 milliLiter(s) Nebulizer every 6 hours PRN Shortness of Breath  diphenhydrAMINE Injectable 25 milliGRAM(s) IV Push every 8 hours PRN Migraine  ketorolac   Injectable 30 milliGRAM(s) IV Push every 8 hours PRN Migraine  meclizine 12.5 milliGRAM(s) Oral once PRN Dizziness  melatonin 9 milliGRAM(s) Oral at bedtime PRN Insomnia  metoclopramide 10 milliGRAM(s) Oral every 8 hours PRN Migraine  oxyCODONE    IR 2.5 milliGRAM(s) Oral every 6 hours PRN Severe Pain (7 - 10)      CAPILLARY BLOOD GLUCOSE        I&O's Summary    13 Apr 2025 07:01  -  14 Apr 2025 07:00  --------------------------------------------------------  IN: 650 mL / OUT: 400 mL / NET: 250 mL        PHYSICAL EXAM:  Vital Signs Last 24 Hrs  T(C): 36.7 (14 Apr 2025 04:00), Max: 36.9 (13 Apr 2025 16:00)  T(F): 98 (14 Apr 2025 04:00), Max: 98.4 (13 Apr 2025 16:00)  HR: 83 (14 Apr 2025 07:00) (66 - 91)  BP: 107/62 (14 Apr 2025 07:00) (81/46 - 120/57)  BP(mean): 81 (14 Apr 2025 07:00) (65 - 82)  RR: 17 (14 Apr 2025 07:00) (11 - 45)  SpO2: 100% (14 Apr 2025 07:00) (92% - 100%)    Parameters below as of 13 Apr 2025 20:00  Patient On (Oxygen Delivery Method): room air        PHYSICAL EXAM:  GENERAL: NAD, lying in bed comfortably  HEENT: NC/AT  CHEST/LUNG: CTAB, no increased WOB  HEART: RRR, no m/r/g  ABDOMEN: soft, NT, ND, BS+  NERVOUS SYSTEM:  A&Ox3    LABS:                        11.0   9.61  )-----------( 337      ( 13 Apr 2025 22:36 )             34.3     04-13    137  |  103  |  11  ----------------------------<  84  4.3   |  22  |  1.06    Ca    8.5      13 Apr 2025 22:36  Phos  3.0     04-13  Mg     2.1     04-13    TPro  6.8  /  Alb  3.8  /  TBili  0.8  /  DBili  x   /  AST  11  /  ALT  7[L]  /  AlkPhos  62  04-13    PT/INR - ( 13 Apr 2025 22:36 )   PT: 13.7 sec;   INR: 1.19 ratio         PTT - ( 13 Apr 2025 22:36 )  PTT:39.6 sec      Urinalysis Basic - ( 13 Apr 2025 22:36 )    Color: x / Appearance: x / SG: x / pH: x  Gluc: 84 mg/dL / Ketone: x  / Bili: x / Urobili: x   Blood: x / Protein: x / Nitrite: x   Leuk Esterase: x / RBC: x / WBC x   Sq Epi: x / Non Sq Epi: x / Bacteria: x        Culture - Blood (collected 11 Apr 2025 16:30)  Source: Blood Blood  Preliminary Report (13 Apr 2025 22:01):    No growth at 48 Hours    Culture - Blood (collected 11 Apr 2025 16:25)  Source: Blood Blood  Preliminary Report (13 Apr 2025 22:01):    No growth at 48 Hours    Assessment and Plan:    Neurologic:  -AAOx3    #migraines  -Headache cocktail per neuro: "-First line: recommend migraine medications (to be given all together at the same time if no contraindications form comorbitities): ketorolac (Toradol) 30mg IV q8h PRN (or acetaminophen 1g IV q8h PRN), metoclopramide (Reglan) 10mg q8h PRN  -hold all benadryl until after allergy testing on 4/22    #peripheral vertigo  -meclizine 12.5 mg PRN     Cardiovascular:  #Hypotension:  -Hold bumetanide, spironolactone, sildenafil, and nifedipine.  -midodrine 20 mg TID held as not needed   -Heart failure signing off as repeat TTE similar to prior     #Right Heart Failure:  -Hold bumetanide, spironolactone, and nifedipine.  -Heart failure signing off as repeat TTE similar to prior   - s/p TTE  -MYRIAM on 4/16 for pulmonic valve eval pending      #Pulmonary Hypertension:  -Continue home Remodulin subQ 51 ng/kg/min.  -midline in place by IR on 4/11  -Check BNP  - s/p TTE  - MYRIAM on 4/16 without evidence of pulmonic damage   - Transplant Pulmonology following    #Chronic PE  - Xarelto dc'ed in anticipation potential I&D  - Xarelto restarted on 4/14 post surgical aspiration of LLQ collection    Pulmonary:  #Pulmonary Hypertension:  -Continue home Remodulin subQ 51 ng/kg/min.  -midline in place by IR on 4/11  -Check BNP  - s/p TTE  -MYRIAM on 4/16 for pulmonic valve eval pending  - Transplant Pulmonology following    #Asthma  Continue home ipratropium (Atrovent) nebulizer PRN.  Continue home budesonide/formoterol (Pulmicort), tiotropium (Spiriva), and PRN nebulizers.  Continue fluticasone (Flonase).  Maintain supplemental O2 2L NC, keep SpO2 > 92%, avoid hyperoxia.    #Chronic PE:  - Xarelto dc'ed in anticipation potential I&D  - Xarelto restarted on 4/14 post surgical aspiration of LLQ collection      #Lung Transplant Evaluation  Ongoing pre-lung transplant workup.  Transplant Pulm requesting LLQ abscess I&D for medical optimization for lung transplant candidacy   Seen by pulmonary transplant service.  Patient is being considered for inpatient transplant workup per Dr. Wise.  Needs cardiac MRI (requires transition to MRI-compatible pump—pulmonary transplant team working on this).  cardiac MRI on 4/14 @ 01:00 PM   -s/p cardiac MRI  - MRCP on 4/15  - MYRIAM  - CT colongraphy on 4/18 for further workup for iron deficiency anemia   - no evidence of polyps on CT colonography      /Renal:  -no active issues  -replete electrolytes as needed     GI:  #Abdominal Abscess  -linezolid 600 mg PO q12h for 10-14 days until 4/16 was dc'ed by ID and switched to daptomycin 6 mg/kg until 4/19  -Per general surgery, no acute surgical intervention at this time for abscess.   -Transplant Pulm requesting LLQ abscess I&D for medical optimization for lung transplant candidacy   -s/p LLQ collection aspiration on 4/13  -wound cultures grew pseudomonas -> added IV cefepime 4/16 end on 4/21    #yeast infection  -diflucan 150 mg given on 4/15    #weight loss   -on ozempic  -can restart    Hematology/Onc:  #Iron Deficiency Anemia (JULIA)  -outpatient HIE note from 03/21/25 with dx of microcytic anemia (iron deficiency vs hemoglobinopathy)  - venofer x 3 doses to end on 4/17  - f/u Hb electrophoresis   - CT colongraphy on 4/18 for further workup for iron deficiency anemia   - no evidence of polyps on CT colonography    ID:  #Abdominal Abscess  linezolid 600 mg PO q12h for 10-14 days until 4/16 was dc'ed by ID and switched to daptomycin 6 mg/kg until 4/19  wound cultures grew pseudomonas -> added IV cefepime 4/16 end on 4/21    #penicillin allergy   -per allergy note on 4/10 from inpatient team was recommending a graded challenge, appears to not have occurred as of yet     - administer 10% of standard amoxicillin dose, then observe for 30 minutes     - if tolerated, administer remaining 90% of standard amoxicillin dose, then observe for 60 minutes  - if patient does not develop immediate reactions then is considered to be not allergic and penicillin allergy can be delabeled  - Reach out to allergy fellow with results as they said they would also drop a chart note with results  -Testing can happen on 9/22 after 6 am as pt was last given benadryl on 9/16 @ 6 am as part of her headache cocktail  -Hold all antihistamines until after allergy testing  -topical corticosteroids are okay for itching in the interterm       SKIN:  #pedunculated mass on L anterior thigh  - evaluated by derm on 10/2024, no concern for malignancy, was told to follow up outpatient for removal   - plastics consulted      Patient is a 44y old  Female who presents with a chief complaint of Abdominal abscess     Overnight no acute events. Pt reports feeling fine.     MEDICATIONS  (STANDING):  buDESOnide    Inhalation Suspension 0.5 milliGRAM(s) Inhalation daily  chlorhexidine 2% Cloths 1 Application(s) Topical <User Schedule>  DAPTOmycin IVPB      DAPTOmycin IVPB 600 milliGRAM(s) IV Intermittent every 24 hours  fluticasone propionate 50 MICROgram(s)/spray Nasal Spray 1 Spray(s) Both Nostrils two times a day  heparin   Injectable 5000 Unit(s) SubCutaneous every 8 hours  influenza   Vaccine 0.5 milliLiter(s) IntraMuscular once  midodrine. 20 milliGRAM(s) Oral three times a day  montelukast 10 milliGRAM(s) Oral daily  pantoprazole    Tablet 40 milliGRAM(s) Oral before breakfast  polyethylene glycol 3350 17 Gram(s) Oral two times a day  remodulin (treprostinil) SubQ infusion 1 Dose(s) 1 Dose(s) SubCutaneous Continuous Pump  senna 2 Tablet(s) Oral at bedtime    MEDICATIONS  (PRN):  acetaminophen     Tablet .. 1000 milliGRAM(s) Oral every 6 hours PRN Mild Pain (1 - 3), Moderate Pain (4 - 6)  albuterol/ipratropium for Nebulization 3 milliLiter(s) Nebulizer every 6 hours PRN Shortness of Breath  diphenhydrAMINE Injectable 25 milliGRAM(s) IV Push every 8 hours PRN Migraine  ketorolac   Injectable 30 milliGRAM(s) IV Push every 8 hours PRN Migraine  meclizine 12.5 milliGRAM(s) Oral once PRN Dizziness  melatonin 9 milliGRAM(s) Oral at bedtime PRN Insomnia  metoclopramide 10 milliGRAM(s) Oral every 8 hours PRN Migraine  oxyCODONE    IR 2.5 milliGRAM(s) Oral every 6 hours PRN Severe Pain (7 - 10)      CAPILLARY BLOOD GLUCOSE        I&O's Summary    13 Apr 2025 07:01  -  14 Apr 2025 07:00  --------------------------------------------------------  IN: 650 mL / OUT: 400 mL / NET: 250 mL        PHYSICAL EXAM:  Vital Signs Last 24 Hrs  T(C): 36.7 (14 Apr 2025 04:00), Max: 36.9 (13 Apr 2025 16:00)  T(F): 98 (14 Apr 2025 04:00), Max: 98.4 (13 Apr 2025 16:00)  HR: 83 (14 Apr 2025 07:00) (66 - 91)  BP: 107/62 (14 Apr 2025 07:00) (81/46 - 120/57)  BP(mean): 81 (14 Apr 2025 07:00) (65 - 82)  RR: 17 (14 Apr 2025 07:00) (11 - 45)  SpO2: 100% (14 Apr 2025 07:00) (92% - 100%)    Parameters below as of 13 Apr 2025 20:00  Patient On (Oxygen Delivery Method): room air        PHYSICAL EXAM:  GENERAL: NAD, lying in bed comfortably  HEENT: NC/AT  CHEST/LUNG: CTAB, no increased WOB  HEART: RRR, no m/r/g  ABDOMEN: soft, NT, ND, BS+  NERVOUS SYSTEM:  A&Ox3    LABS:                        11.0   9.61  )-----------( 337      ( 13 Apr 2025 22:36 )             34.3     04-13    137  |  103  |  11  ----------------------------<  84  4.3   |  22  |  1.06    Ca    8.5      13 Apr 2025 22:36  Phos  3.0     04-13  Mg     2.1     04-13    TPro  6.8  /  Alb  3.8  /  TBili  0.8  /  DBili  x   /  AST  11  /  ALT  7[L]  /  AlkPhos  62  04-13    PT/INR - ( 13 Apr 2025 22:36 )   PT: 13.7 sec;   INR: 1.19 ratio         PTT - ( 13 Apr 2025 22:36 )  PTT:39.6 sec      Urinalysis Basic - ( 13 Apr 2025 22:36 )    Color: x / Appearance: x / SG: x / pH: x  Gluc: 84 mg/dL / Ketone: x  / Bili: x / Urobili: x   Blood: x / Protein: x / Nitrite: x   Leuk Esterase: x / RBC: x / WBC x   Sq Epi: x / Non Sq Epi: x / Bacteria: x        Culture - Blood (collected 11 Apr 2025 16:30)  Source: Blood Blood  Preliminary Report (13 Apr 2025 22:01):    No growth at 48 Hours    Culture - Blood (collected 11 Apr 2025 16:25)  Source: Blood Blood  Preliminary Report (13 Apr 2025 22:01):    No growth at 48 Hours    Assessment and Plan:    Neurologic:  -AAOx3    #migraines  -Headache cocktail per neuro: "-First line: recommend migraine medications (to be given all together at the same time if no contraindications form comorbitities): ketorolac (Toradol) 30mg IV q8h PRN (or acetaminophen 1g IV q8h PRN), metoclopramide (Reglan) 10mg q8h PRN  -hold all benadryl until after allergy testing on 4/22    #peripheral vertigo  -meclizine 12.5 mg PRN     Cardiovascular:  #Hypotension:  -Hold bumetanide, spironolactone, sildenafil, and nifedipine.  -midodrine 20 mg TID held as not needed   -Heart failure signing off as repeat TTE similar to prior     #Right Heart Failure:  -Hold bumetanide, spironolactone, and nifedipine.  -Heart failure signing off as repeat TTE similar to prior   - s/p TTE  -MYRIAM on 4/16 for pulmonic valve eval pending      #Pulmonary Hypertension:  -Continue home Remodulin subQ 51 ng/kg/min.  -midline in place by IR on 4/11  -Check BNP  - s/p TTE  - MYRIAM on 4/16 without evidence of pulmonic damage   - Transplant Pulmonology following    #Chronic PE  - Xarelto dc'ed in anticipation potential I&D  - Xarelto restarted on 4/14 post surgical aspiration of LLQ collection    Pulmonary:  #Pulmonary Hypertension:  -Continue home Remodulin subQ 51 ng/kg/min.  -midline in place by IR on 4/11  -Check BNP  - s/p TTE  -MYRIAM on 4/16 for pulmonic valve eval pending  - Transplant Pulmonology following    #Asthma  Continue home ipratropium (Atrovent) nebulizer PRN.  Continue home budesonide/formoterol (Pulmicort), tiotropium (Spiriva), and PRN nebulizers.  Continue fluticasone (Flonase).  Maintain supplemental O2 2L NC, keep SpO2 > 92%, avoid hyperoxia.    #Chronic PE:  - Xarelto dc'ed in anticipation potential I&D  - Xarelto restarted on 4/14 post surgical aspiration of LLQ collection      #Lung Transplant Evaluation  Ongoing pre-lung transplant workup.  Transplant Pulm requesting LLQ abscess I&D for medical optimization for lung transplant candidacy   Seen by pulmonary transplant service.  Patient is being considered for inpatient transplant workup per Dr. Wise.  Needs cardiac MRI (requires transition to MRI-compatible pump—pulmonary transplant team working on this).  cardiac MRI on 4/14 @ 01:00 PM   -s/p cardiac MRI  - MRCP on 4/15  - MYRIAM  - CT colongraphy on 4/18 for further workup for iron deficiency anemia   - no evidence of polyps on CT colonography      /Renal:  -no active issues  -replete electrolytes as needed     GI:  #Abdominal Abscess  -linezolid 600 mg PO q12h for 10-14 days until 4/16 was dc'ed by ID and switched to daptomycin 6 mg/kg until 4/19  -Per general surgery, no acute surgical intervention at this time for abscess.   -Transplant Pulm requesting LLQ abscess I&D for medical optimization for lung transplant candidacy   -s/p LLQ collection aspiration on 4/13  -wound cultures grew pseudomonas -> added IV cefepime 4/16 end on 4/21    #yeast infection  -diflucan 150 mg given on 4/15    #weight loss   -on ozempic  -can restart    Hematology/Onc:  #Iron Deficiency Anemia (JULIA)  -outpatient HIE note from 03/21/25 with dx of microcytic anemia (iron deficiency vs hemoglobinopathy)  - venofer x 3 doses to end on 4/17  - f/u Hb electrophoresis   - CT colongraphy on 4/18 for further workup for iron deficiency anemia   - no evidence of polyps on CT colonography    ID:  #Abdominal Abscess  linezolid 600 mg PO q12h for 10-14 days until 4/16 was dc'ed by ID and switched to daptomycin 6 mg/kg until 4/19  wound cultures grew pseudomonas -> added IV cefepime 4/16 end on 4/21    #penicillin allergy   -per allergy note on 4/10 from inpatient team was recommending a graded challenge, appears to not have occurred as of yet     - administer 10% of standard amoxicillin dose, then observe for 30 minutes     - if tolerated, administer remaining 90% of standard amoxicillin dose, then observe for 60 minutes  - if patient does not develop immediate reactions then is considered to be not allergic and penicillin allergy can be delabeled  - Pending Penicillin allergy test on 4/22  -Hold all antihistamines until after allergy testing  -topical corticosteroids are okay for itching in the interterm       SKIN:  #pedunculated mass on L anterior thigh  - evaluated by derm on 10/2024, no concern for malignancy, was told to follow up outpatient for removal   - plastics consulted      Patient is a 44y old  Female who presents with a chief complaint of Abdominal abscess     Overnight no acute events. Pt reports feeling fine.     MEDICATIONS  (STANDING):  buDESOnide    Inhalation Suspension 0.5 milliGRAM(s) Inhalation daily  chlorhexidine 2% Cloths 1 Application(s) Topical <User Schedule>  DAPTOmycin IVPB      DAPTOmycin IVPB 600 milliGRAM(s) IV Intermittent every 24 hours  fluticasone propionate 50 MICROgram(s)/spray Nasal Spray 1 Spray(s) Both Nostrils two times a day  heparin   Injectable 5000 Unit(s) SubCutaneous every 8 hours  influenza   Vaccine 0.5 milliLiter(s) IntraMuscular once  midodrine. 20 milliGRAM(s) Oral three times a day  montelukast 10 milliGRAM(s) Oral daily  pantoprazole    Tablet 40 milliGRAM(s) Oral before breakfast  polyethylene glycol 3350 17 Gram(s) Oral two times a day  remodulin (treprostinil) SubQ infusion 1 Dose(s) 1 Dose(s) SubCutaneous Continuous Pump  senna 2 Tablet(s) Oral at bedtime    MEDICATIONS  (PRN):  acetaminophen     Tablet .. 1000 milliGRAM(s) Oral every 6 hours PRN Mild Pain (1 - 3), Moderate Pain (4 - 6)  albuterol/ipratropium for Nebulization 3 milliLiter(s) Nebulizer every 6 hours PRN Shortness of Breath  diphenhydrAMINE Injectable 25 milliGRAM(s) IV Push every 8 hours PRN Migraine  ketorolac   Injectable 30 milliGRAM(s) IV Push every 8 hours PRN Migraine  meclizine 12.5 milliGRAM(s) Oral once PRN Dizziness  melatonin 9 milliGRAM(s) Oral at bedtime PRN Insomnia  metoclopramide 10 milliGRAM(s) Oral every 8 hours PRN Migraine  oxyCODONE    IR 2.5 milliGRAM(s) Oral every 6 hours PRN Severe Pain (7 - 10)      CAPILLARY BLOOD GLUCOSE        I&O's Summary    13 Apr 2025 07:01  -  14 Apr 2025 07:00  --------------------------------------------------------  IN: 650 mL / OUT: 400 mL / NET: 250 mL        PHYSICAL EXAM:  Vital Signs Last 24 Hrs  T(C): 36.7 (14 Apr 2025 04:00), Max: 36.9 (13 Apr 2025 16:00)  T(F): 98 (14 Apr 2025 04:00), Max: 98.4 (13 Apr 2025 16:00)  HR: 83 (14 Apr 2025 07:00) (66 - 91)  BP: 107/62 (14 Apr 2025 07:00) (81/46 - 120/57)  BP(mean): 81 (14 Apr 2025 07:00) (65 - 82)  RR: 17 (14 Apr 2025 07:00) (11 - 45)  SpO2: 100% (14 Apr 2025 07:00) (92% - 100%)    Parameters below as of 13 Apr 2025 20:00  Patient On (Oxygen Delivery Method): room air        PHYSICAL EXAM:  GENERAL: NAD, lying in bed comfortably  HEENT: NC/AT  CHEST/LUNG: CTAB, no increased WOB  HEART: RRR, no m/r/g  ABDOMEN: soft, NT, ND, BS+  NERVOUS SYSTEM:  A&Ox3    LABS:                        11.0   9.61  )-----------( 337      ( 13 Apr 2025 22:36 )             34.3     04-13    137  |  103  |  11  ----------------------------<  84  4.3   |  22  |  1.06    Ca    8.5      13 Apr 2025 22:36  Phos  3.0     04-13  Mg     2.1     04-13    TPro  6.8  /  Alb  3.8  /  TBili  0.8  /  DBili  x   /  AST  11  /  ALT  7[L]  /  AlkPhos  62  04-13    PT/INR - ( 13 Apr 2025 22:36 )   PT: 13.7 sec;   INR: 1.19 ratio         PTT - ( 13 Apr 2025 22:36 )  PTT:39.6 sec      Urinalysis Basic - ( 13 Apr 2025 22:36 )    Color: x / Appearance: x / SG: x / pH: x  Gluc: 84 mg/dL / Ketone: x  / Bili: x / Urobili: x   Blood: x / Protein: x / Nitrite: x   Leuk Esterase: x / RBC: x / WBC x   Sq Epi: x / Non Sq Epi: x / Bacteria: x        Culture - Blood (collected 11 Apr 2025 16:30)  Source: Blood Blood  Preliminary Report (13 Apr 2025 22:01):    No growth at 48 Hours    Culture - Blood (collected 11 Apr 2025 16:25)  Source: Blood Blood  Preliminary Report (13 Apr 2025 22:01):    No growth at 48 Hours    Assessment and Plan:    Neurologic:  -AAOx3    #migraines  -Headache cocktail per neuro: "-First line: recommend migraine medications (to be given all together at the same time if no contraindications form comorbitities): ketorolac (Toradol) 30mg IV q8h PRN (or acetaminophen 1g IV q8h PRN), metoclopramide (Reglan) 10mg q8h PRN  -hold all benadryl until after allergy testing on 4/22    #peripheral vertigo  -meclizine 12.5 mg PRN     Cardiovascular:  #Hypotension:  -Hold bumetanide, spironolactone, sildenafil, and nifedipine.  -midodrine 20 mg TID held as not needed   -Heart failure signing off as repeat TTE similar to prior     #Right Heart Failure:  -Hold bumetanide, spironolactone, and nifedipine.  -Heart failure signing off as repeat TTE similar to prior   - s/p TTE  -MYRIAM on 4/16 for pulmonic valve eval pending      #Pulmonary Hypertension:  -Continue home Remodulin subQ 51 ng/kg/min.  -midline in place by IR on 4/11  -Check BNP  - s/p TTE  - MYRIAM on 4/16 without evidence of pulmonic damage   - Transplant Pulmonology following    #Chronic PE  - Xarelto dc'ed in anticipation potential I&D  - Xarelto restarted on 4/14 post surgical aspiration of LLQ collection    Pulmonary:  #Pulmonary Hypertension:  -Continue home Remodulin subQ 51 ng/kg/min.  -midline in place by IR on 4/11  -Check BNP  - s/p TTE  -MYRIAM on 4/16 for pulmonic valve eval pending  - Transplant Pulmonology following    #Asthma  Continue home ipratropium (Atrovent) nebulizer PRN.  Continue home budesonide/formoterol (Pulmicort), tiotropium (Spiriva), and PRN nebulizers.  Continue fluticasone (Flonase).  Maintain supplemental O2 2L NC, keep SpO2 > 92%, avoid hyperoxia.    #Chronic PE:  - Xarelto dc'ed in anticipation potential I&D  - Xarelto restarted on 4/14 post surgical aspiration of LLQ collection      #Lung Transplant Evaluation  Ongoing pre-lung transplant workup.  Transplant Pulm requesting LLQ abscess I&D for medical optimization for lung transplant candidacy   Seen by pulmonary transplant service.  Patient is being considered for inpatient transplant workup per Dr. Wise.  Needs cardiac MRI (requires transition to MRI-compatible pump—pulmonary transplant team working on this).  cardiac MRI on 4/14 @ 01:00 PM   -s/p cardiac MRI  - MRCP on 4/15  - MYRIAM  - CT colongraphy on 4/18 for further workup for iron deficiency anemia   - no evidence of polyps on CT colonography      /Renal:  -no active issues  -replete electrolytes as needed     GI:  #Abdominal Abscess  -linezolid 600 mg PO q12h for 10-14 days until 4/16 was dc'ed by ID and switched to daptomycin 6 mg/kg until 4/19  -Per general surgery, no acute surgical intervention at this time for abscess.   -Transplant Pulm requesting LLQ abscess I&D for medical optimization for lung transplant candidacy   -s/p LLQ collection aspiration on 4/13  -wound cultures grew pseudomonas -> added IV cefepime 4/16 end on 4/21    #yeast infection  -diflucan 150 mg given on 4/15    #weight loss   -on ozempic  -can restart    Hematology/Onc:  #Iron Deficiency Anemia (JULIA)  -outpatient HIE note from 03/21/25 with dx of microcytic anemia (iron deficiency vs hemoglobinopathy)  - venofer x 3 doses to end on 4/17  - f/u Hb electrophoresis   - CT colongraphy on 4/18 for further workup for iron deficiency anemia   - no evidence of polyps on CT colonography    ID:  #Abdominal Abscess  linezolid 600 mg PO q12h for 10-14 days until 4/16 was dc'ed by ID and switched to daptomycin 6 mg/kg until 4/19  wound cultures grew pseudomonas -> added IV cefepime 4/16 end on 4/21    #penicillin allergy   -per allergy note on 4/10 from inpatient team was recommending a graded challenge, appears to not have occurred as of yet     - administer 10% of standard amoxicillin dose, then observe for 30 minutes     - if tolerated, administer remaining 90% of standard amoxicillin dose, then observe for 60 minutes  - if patient does not develop immediate reactions then is considered to be not allergic and penicillin allergy can be delabeled  - Pending Penicillin allergy test on 4/22  -Hold all antihistamines until after allergy testing  -topical corticosteroids are okay for itching in the interterm s      SKIN:  #pedunculated mass on L anterior thigh  - evaluated by derm on 10/2024, no concern for malignancy, was told to follow up outpatient for removal   - plastics consulted

## 2025-04-20 NOTE — PROGRESS NOTE ADULT - SUBJECTIVE AND OBJECTIVE BOX
Discussed with MICU staff who cleared pt.  Options/alts/risks/cxs discussed and formal written consent obtained.  Tolerated excision of large mass of the left thigh and with excellent hemostasis and gentle pressure dressing appled.  May shower tomorrow.  Abx as already in progress as per primary team.  I personally obtained formalin from and brought and hand-delivered the specimen to the path lab.  Tylenol prn.  Follow up next week in my office as outpt.  Thank you.  Cell [phone].

## 2025-04-21 ENCOUNTER — NON-APPOINTMENT (OUTPATIENT)
Age: 45
End: 2025-04-21

## 2025-04-21 LAB
BLD GP AB SCN SERPL QL: NEGATIVE — SIGNIFICANT CHANGE UP
GLUCOSE BLDC GLUCOMTR-MCNC: 100 MG/DL — HIGH (ref 70–99)
RH IG SCN BLD-IMP: POSITIVE — SIGNIFICANT CHANGE UP

## 2025-04-21 PROCEDURE — 99233 SBSQ HOSP IP/OBS HIGH 50: CPT | Mod: GC

## 2025-04-21 RX ORDER — ACETAMINOPHEN 500 MG/5ML
650 LIQUID (ML) ORAL ONCE
Refills: 0 | Status: COMPLETED | OUTPATIENT
Start: 2025-04-21 | End: 2025-04-21

## 2025-04-21 RX ORDER — ACETAMINOPHEN 500 MG/5ML
1000 LIQUID (ML) ORAL ONCE
Refills: 0 | Status: COMPLETED | OUTPATIENT
Start: 2025-04-21 | End: 2025-04-21

## 2025-04-21 RX ORDER — KETOROLAC TROMETHAMINE 30 MG/ML
30 INJECTION, SOLUTION INTRAMUSCULAR; INTRAVENOUS ONCE
Refills: 0 | Status: DISCONTINUED | OUTPATIENT
Start: 2025-04-21 | End: 2025-04-21

## 2025-04-21 RX ADMIN — KETOROLAC TROMETHAMINE 30 MILLIGRAM(S): 30 INJECTION, SOLUTION INTRAMUSCULAR; INTRAVENOUS at 17:30

## 2025-04-21 RX ADMIN — Medication 650 MILLIGRAM(S): at 04:00

## 2025-04-21 RX ADMIN — CEFEPIME 100 MILLIGRAM(S): 2 INJECTION, POWDER, FOR SOLUTION INTRAVENOUS at 15:12

## 2025-04-21 RX ADMIN — RIVAROXABAN 15 MILLIGRAM(S): 10 TABLET, FILM COATED ORAL at 17:12

## 2025-04-21 RX ADMIN — KETOROLAC TROMETHAMINE 30 MILLIGRAM(S): 30 INJECTION, SOLUTION INTRAMUSCULAR; INTRAVENOUS at 12:30

## 2025-04-21 RX ADMIN — Medication 1000 MILLIGRAM(S): at 22:48

## 2025-04-21 RX ADMIN — CEFEPIME 100 MILLIGRAM(S): 2 INJECTION, POWDER, FOR SOLUTION INTRAVENOUS at 21:20

## 2025-04-21 RX ADMIN — KETOROLAC TROMETHAMINE 30 MILLIGRAM(S): 30 INJECTION, SOLUTION INTRAMUSCULAR; INTRAVENOUS at 12:00

## 2025-04-21 RX ADMIN — Medication 400 MILLIGRAM(S): at 06:20

## 2025-04-21 RX ADMIN — Medication 400 MILLIGRAM(S): at 21:00

## 2025-04-21 RX ADMIN — KETOROLAC TROMETHAMINE 30 MILLIGRAM(S): 30 INJECTION, SOLUTION INTRAMUSCULAR; INTRAVENOUS at 17:00

## 2025-04-21 RX ADMIN — MONTELUKAST SODIUM 10 MILLIGRAM(S): 10 TABLET ORAL at 11:56

## 2025-04-21 RX ADMIN — Medication 9 MILLIGRAM(S): at 21:20

## 2025-04-21 RX ADMIN — Medication 1 APPLICATION(S): at 05:50

## 2025-04-21 RX ADMIN — Medication 500 MICROGRAM(S): at 17:34

## 2025-04-21 RX ADMIN — Medication 40 MILLIGRAM(S): at 05:49

## 2025-04-21 RX ADMIN — Medication 650 MILLIGRAM(S): at 05:05

## 2025-04-21 RX ADMIN — Medication 1000 MILLIGRAM(S): at 06:20

## 2025-04-21 RX ADMIN — FLUTICASONE PROPIONATE 1 SPRAY(S): 50 SPRAY, METERED NASAL at 17:13

## 2025-04-21 RX ADMIN — CEFEPIME 100 MILLIGRAM(S): 2 INJECTION, POWDER, FOR SOLUTION INTRAVENOUS at 05:49

## 2025-04-21 RX ADMIN — FLUTICASONE PROPIONATE 1 SPRAY(S): 50 SPRAY, METERED NASAL at 05:50

## 2025-04-21 NOTE — PROGRESS NOTE ADULT - SUBJECTIVE AND OBJECTIVE BOX
INTERVAL HPI/OVERNIGHT EVENTS:    SUBJECTIVE: Patient seen and examined at bedside.     ROS: All negative except as listed above.    VITAL SIGNS:  ICU Vital Signs Last 24 Hrs  T(C): 36.5 (21 Apr 2025 04:00), Max: 36.9 (20 Apr 2025 16:00)  T(F): 97.7 (21 Apr 2025 04:00), Max: 98.4 (20 Apr 2025 16:00)  HR: 103 (21 Apr 2025 04:00) (83 - 103)  BP: 100/72 (21 Apr 2025 04:00) (100/72 - 119/74)  BP(mean): 80 (21 Apr 2025 04:00) (72 - 92)  ABP: --  ABP(mean): --  RR: 16 (21 Apr 2025 04:00) (16 - 23)  SpO2: 94% (21 Apr 2025 04:00) (93% - 100%)    O2 Parameters below as of 20 Apr 2025 23:09  Patient On (Oxygen Delivery Method): nasal cannula            Plateau pressure:   P/F ratio:     04-19 @ 07:01  -  04-20 @ 07:00  --------------------------------------------------------  IN: 600 mL / OUT: 400 mL / NET: 200 mL    04-20 @ 07:01  -  04-21 @ 06:53  --------------------------------------------------------  IN: 850 mL / OUT: 0 mL / NET: 850 mL      CAPILLARY BLOOD GLUCOSE      POCT Blood Glucose.: 100 mg/dL (21 Apr 2025 06:26)      ECG: reviewed.    PHYSICAL EXAM:    GENERAL: NAD, lying in bed comfortably  HEAD:  Atraumatic, normocephalic  EYES: EOMI, PERRLA, conjunctiva and sclera clear  NECK: Supple, trachea midline, no JVD  HEART: Regular rate and rhythm, no murmurs, rubs, or gallops  LUNGS: Unlabored respirations.  Clear to auscultation bilaterally, no crackles, wheezing, or rhonchi  ABDOMEN: Soft, nontender, nondistended, +BS  EXTREMITIES: 2+ peripheral pulses bilaterally, cap refill<2 secs. No clubbing, cyanosis, or edema  NERVOUS SYSTEM:  A&Ox3, following commands, moving all extremities, no focal deficits   SKIN: No rashes or lesions    MEDICATIONS:  MEDICATIONS  (STANDING):  cefepime   IVPB 2000 milliGRAM(s) IV Intermittent every 8 hours  cefepime   IVPB      chlorhexidine 2% Cloths 1 Application(s) Topical <User Schedule>  fluticasone propionate 50 MICROgram(s)/spray Nasal Spray 1 Spray(s) Both Nostrils two times a day  ipratropium    for Nebulization 500 MICROGram(s) Nebulizer every 6 hours  montelukast 10 milliGRAM(s) Oral daily  pantoprazole    Tablet 40 milliGRAM(s) Oral before breakfast  remodulin (treprostinil) SubQ infusion 1 Dose(s) 1 Dose(s) SubCutaneous Continuous Pump  rivaroxaban 15 milliGRAM(s) Oral with dinner    MEDICATIONS  (PRN):  acetaminophen     Tablet .. 1000 milliGRAM(s) Oral every 6 hours PRN Mild Pain (1 - 3)  ketorolac   Injectable 30 milliGRAM(s) IV Push every 8 hours PRN Moderate Pain (4 - 6)  meclizine 12.5 milliGRAM(s) Oral once PRN Dizziness  melatonin 9 milliGRAM(s) Oral at bedtime PRN Insomnia  metoclopramide 10 milliGRAM(s) Oral every 8 hours PRN Migraine      ALLERGIES:  Allergies    vancomycin (Rash)  penicillin (Rash)  adhesives (Rash)    Intolerances        LABS:                        9.3    5.79  )-----------( 193      ( 19 Apr 2025 22:40 )             29.9     04-19    137  |  110[H]  |  6[L]  ----------------------------<  89  3.9   |  17[L]  |  0.91    Ca    7.6[L]      19 Apr 2025 22:40  Phos  2.4     04-19  Mg     1.9     04-19    TPro  5.9[L]  /  Alb  3.4  /  TBili  0.3  /  DBili  x   /  AST  15  /  ALT  11  /  AlkPhos  51  04-19    PT/INR - ( 19 Apr 2025 22:40 )   PT: 22.3 sec;   INR: 1.95 ratio         PTT - ( 19 Apr 2025 22:40 )  PTT:50.9 sec  Urinalysis Basic - ( 19 Apr 2025 22:40 )    Color: x / Appearance: x / SG: x / pH: x  Gluc: 89 mg/dL / Ketone: x  / Bili: x / Urobili: x   Blood: x / Protein: x / Nitrite: x   Leuk Esterase: x / RBC: x / WBC x   Sq Epi: x / Non Sq Epi: x / Bacteria: x      ABG:      vBG:    Micro:    Culture - Blood (collected 04-11-25 @ 16:30)  Source: Blood Blood  Final Report (04-16-25 @ 22:00):    No growth at 5 days    Culture - Blood (collected 04-11-25 @ 16:25)  Source: Blood Blood  Final Report (04-16-25 @ 22:00):    No growth at 5 days    Culture - Blood (collected 04-08-25 @ 13:00)  Source: Blood Blood  Final Report (04-13-25 @ 18:00):    No growth at 5 days    Culture - Blood (collected 04-08-25 @ 12:55)  Source: Blood Blood  Final Report (04-13-25 @ 18:00):    No growth at 5 days    Culture - Blood (collected 04-02-25 @ 12:30)  Source: Blood Blood-Peripheral  Final Report (04-07-25 @ 18:01):    No growth at 5 days    Culture - Blood (collected 04-02-25 @ 12:15)  Source: Blood Blood-Peripheral  Final Report (04-07-25 @ 18:01):    No growth at 5 days       Urinalysis with Rflx Culture (collected 04-02-25 @ 15:13)         RADIOLOGY & ADDITIONAL TESTS: Reviewed.   INTERVAL HPI/OVERNIGHT EVENTS: Tylenol x2 ON for increasingly pain    SUBJECTIVE: Patient seen and examined at bedside. Continued pain in the lower extremity    ROS: All negative except as listed above.    VITAL SIGNS:  ICU Vital Signs Last 24 Hrs  T(C): 36.5 (21 Apr 2025 04:00), Max: 36.9 (20 Apr 2025 16:00)  T(F): 97.7 (21 Apr 2025 04:00), Max: 98.4 (20 Apr 2025 16:00)  HR: 103 (21 Apr 2025 04:00) (83 - 103)  BP: 100/72 (21 Apr 2025 04:00) (100/72 - 119/74)  BP(mean): 80 (21 Apr 2025 04:00) (72 - 92)  ABP: --  ABP(mean): --  RR: 16 (21 Apr 2025 04:00) (16 - 23)  SpO2: 94% (21 Apr 2025 04:00) (93% - 100%)    O2 Parameters below as of 20 Apr 2025 23:09  Patient On (Oxygen Delivery Method): nasal cannula            Plateau pressure:   P/F ratio:     04-19 @ 07:01  -  04-20 @ 07:00  --------------------------------------------------------  IN: 600 mL / OUT: 400 mL / NET: 200 mL    04-20 @ 07:01  -  04-21 @ 06:53  --------------------------------------------------------  IN: 850 mL / OUT: 0 mL / NET: 850 mL      CAPILLARY BLOOD GLUCOSE      POCT Blood Glucose.: 100 mg/dL (21 Apr 2025 06:26)      ECG: reviewed.    PHYSICAL EXAM:    GENERAL: NAD, lying in bed comfortably  HEAD:  Atraumatic, normocephalic  EYES: EOMI, PERRLA, conjunctiva and sclera clear  NECK: Supple, trachea midline, no JVD  HEART: Regular rate and rhythm, no murmurs, rubs, or gallops  LUNGS: Unlabored respirations.  Clear to auscultation bilaterally, no crackles, wheezing, or rhonchi  ABDOMEN: Soft, nontender, nondistended, +BS  EXTREMITIES: 2+ peripheral pulses bilaterally, cap refill<2 secs. No clubbing, cyanosis, or edema  NERVOUS SYSTEM:  A&Ox3, following commands, moving all extremities, no focal deficits   SKIN: No rashes or lesions    MEDICATIONS:  MEDICATIONS  (STANDING):  cefepime   IVPB 2000 milliGRAM(s) IV Intermittent every 8 hours  cefepime   IVPB      chlorhexidine 2% Cloths 1 Application(s) Topical <User Schedule>  fluticasone propionate 50 MICROgram(s)/spray Nasal Spray 1 Spray(s) Both Nostrils two times a day  ipratropium    for Nebulization 500 MICROGram(s) Nebulizer every 6 hours  montelukast 10 milliGRAM(s) Oral daily  pantoprazole    Tablet 40 milliGRAM(s) Oral before breakfast  remodulin (treprostinil) SubQ infusion 1 Dose(s) 1 Dose(s) SubCutaneous Continuous Pump  rivaroxaban 15 milliGRAM(s) Oral with dinner    MEDICATIONS  (PRN):  acetaminophen     Tablet .. 1000 milliGRAM(s) Oral every 6 hours PRN Mild Pain (1 - 3)  ketorolac   Injectable 30 milliGRAM(s) IV Push every 8 hours PRN Moderate Pain (4 - 6)  meclizine 12.5 milliGRAM(s) Oral once PRN Dizziness  melatonin 9 milliGRAM(s) Oral at bedtime PRN Insomnia  metoclopramide 10 milliGRAM(s) Oral every 8 hours PRN Migraine      ALLERGIES:  Allergies    vancomycin (Rash)  penicillin (Rash)  adhesives (Rash)    Intolerances        LABS:                        9.3    5.79  )-----------( 193      ( 19 Apr 2025 22:40 )             29.9     04-19    137  |  110[H]  |  6[L]  ----------------------------<  89  3.9   |  17[L]  |  0.91    Ca    7.6[L]      19 Apr 2025 22:40  Phos  2.4     04-19  Mg     1.9     04-19    TPro  5.9[L]  /  Alb  3.4  /  TBili  0.3  /  DBili  x   /  AST  15  /  ALT  11  /  AlkPhos  51  04-19    PT/INR - ( 19 Apr 2025 22:40 )   PT: 22.3 sec;   INR: 1.95 ratio         PTT - ( 19 Apr 2025 22:40 )  PTT:50.9 sec  Urinalysis Basic - ( 19 Apr 2025 22:40 )    Color: x / Appearance: x / SG: x / pH: x  Gluc: 89 mg/dL / Ketone: x  / Bili: x / Urobili: x   Blood: x / Protein: x / Nitrite: x   Leuk Esterase: x / RBC: x / WBC x   Sq Epi: x / Non Sq Epi: x / Bacteria: x      ABG:      vBG:    Micro:    Culture - Blood (collected 04-11-25 @ 16:30)  Source: Blood Blood  Final Report (04-16-25 @ 22:00):    No growth at 5 days    Culture - Blood (collected 04-11-25 @ 16:25)  Source: Blood Blood  Final Report (04-16-25 @ 22:00):    No growth at 5 days    Culture - Blood (collected 04-08-25 @ 13:00)  Source: Blood Blood  Final Report (04-13-25 @ 18:00):    No growth at 5 days    Culture - Blood (collected 04-08-25 @ 12:55)  Source: Blood Blood  Final Report (04-13-25 @ 18:00):    No growth at 5 days    Culture - Blood (collected 04-02-25 @ 12:30)  Source: Blood Blood-Peripheral  Final Report (04-07-25 @ 18:01):    No growth at 5 days    Culture - Blood (collected 04-02-25 @ 12:15)  Source: Blood Blood-Peripheral  Final Report (04-07-25 @ 18:01):    No growth at 5 days       Urinalysis with Rflx Culture (collected 04-02-25 @ 15:13)         RADIOLOGY & ADDITIONAL TESTS: Reviewed. Spontaneous, unlabored and symmetrical

## 2025-04-21 NOTE — PROGRESS NOTE ADULT - ATTENDING COMMENTS
44F w/ Group 1 PAH on treprostinil infusion, s/p PPM, chronic PE, SVT s/p ablation, abdominal wall abscess w/ MRSA. Presents w/ worsening abdominal abscess s/p I&D w/ cx growing MRSA. COntinues to be admitted for ongoing lung transplant evaluation. Transferred to MICU for close monitoring while getting imaging while off treprostinil and now for PCN desensitization.     #Neuro - awake and alert; pain regimen, please avoid benadryl  #CV - HD stable   #Pulm - continue treprostinil infusion for PAH at current dosing; SpO2 acceptable on 2LNC; continue atrovent, singulair and flonase; lung transplant evaluation ongoing  #ID- currently on cefepime for pseudomonas growing in wound culture - last day today, s/p daptomycin for MRSA in I&D culture; planning for PCN desensitization per allergy recs likely tomorrow - f/u today  #Renal/metabolic - normal renal function; monitor I/Os and electrolytes  #GI- PO diet as tolerated, PPI  #Heme - slowly decreasing hgb, no active bleeding, monitor for now; continue apixiban for hx of DVT  #Endo - monitor BG  #Skin - s/p removal of skin tag/mass on L thigh by plastics - dressing changes and surgical site care per plastics   #PPx - apixiban  #Dispo- remains in ICU for close monitoring and for PCN desensitization; prognosis guarded; full code; OOB to chair, ambulation

## 2025-04-21 NOTE — PROGRESS NOTE ADULT - ASSESSMENT
Patient is a 44y old  Female who presents with a chief complaint of Abdominal abscess     Overnight no acute events. Pt reports feeling fine.     MEDICATIONS  (STANDING):  buDESOnide    Inhalation Suspension 0.5 milliGRAM(s) Inhalation daily  chlorhexidine 2% Cloths 1 Application(s) Topical <User Schedule>  DAPTOmycin IVPB      DAPTOmycin IVPB 600 milliGRAM(s) IV Intermittent every 24 hours  fluticasone propionate 50 MICROgram(s)/spray Nasal Spray 1 Spray(s) Both Nostrils two times a day  heparin   Injectable 5000 Unit(s) SubCutaneous every 8 hours  influenza   Vaccine 0.5 milliLiter(s) IntraMuscular once  midodrine. 20 milliGRAM(s) Oral three times a day  montelukast 10 milliGRAM(s) Oral daily  pantoprazole    Tablet 40 milliGRAM(s) Oral before breakfast  polyethylene glycol 3350 17 Gram(s) Oral two times a day  remodulin (treprostinil) SubQ infusion 1 Dose(s) 1 Dose(s) SubCutaneous Continuous Pump  senna 2 Tablet(s) Oral at bedtime    MEDICATIONS  (PRN):  acetaminophen     Tablet .. 1000 milliGRAM(s) Oral every 6 hours PRN Mild Pain (1 - 3), Moderate Pain (4 - 6)  albuterol/ipratropium for Nebulization 3 milliLiter(s) Nebulizer every 6 hours PRN Shortness of Breath  diphenhydrAMINE Injectable 25 milliGRAM(s) IV Push every 8 hours PRN Migraine  ketorolac   Injectable 30 milliGRAM(s) IV Push every 8 hours PRN Migraine  meclizine 12.5 milliGRAM(s) Oral once PRN Dizziness  melatonin 9 milliGRAM(s) Oral at bedtime PRN Insomnia  metoclopramide 10 milliGRAM(s) Oral every 8 hours PRN Migraine  oxyCODONE    IR 2.5 milliGRAM(s) Oral every 6 hours PRN Severe Pain (7 - 10)      CAPILLARY BLOOD GLUCOSE        I&O's Summary    13 Apr 2025 07:01  -  14 Apr 2025 07:00  --------------------------------------------------------  IN: 650 mL / OUT: 400 mL / NET: 250 mL        PHYSICAL EXAM:  Vital Signs Last 24 Hrs  T(C): 36.7 (14 Apr 2025 04:00), Max: 36.9 (13 Apr 2025 16:00)  T(F): 98 (14 Apr 2025 04:00), Max: 98.4 (13 Apr 2025 16:00)  HR: 83 (14 Apr 2025 07:00) (66 - 91)  BP: 107/62 (14 Apr 2025 07:00) (81/46 - 120/57)  BP(mean): 81 (14 Apr 2025 07:00) (65 - 82)  RR: 17 (14 Apr 2025 07:00) (11 - 45)  SpO2: 100% (14 Apr 2025 07:00) (92% - 100%)    Parameters below as of 13 Apr 2025 20:00  Patient On (Oxygen Delivery Method): room air        PHYSICAL EXAM:  GENERAL: NAD, lying in bed comfortably  HEENT: NC/AT  CHEST/LUNG: CTAB, no increased WOB  HEART: RRR, no m/r/g  ABDOMEN: soft, NT, ND, BS+  NERVOUS SYSTEM:  A&Ox3    LABS:                        11.0   9.61  )-----------( 337      ( 13 Apr 2025 22:36 )             34.3     04-13    137  |  103  |  11  ----------------------------<  84  4.3   |  22  |  1.06    Ca    8.5      13 Apr 2025 22:36  Phos  3.0     04-13  Mg     2.1     04-13    TPro  6.8  /  Alb  3.8  /  TBili  0.8  /  DBili  x   /  AST  11  /  ALT  7[L]  /  AlkPhos  62  04-13    PT/INR - ( 13 Apr 2025 22:36 )   PT: 13.7 sec;   INR: 1.19 ratio         PTT - ( 13 Apr 2025 22:36 )  PTT:39.6 sec      Urinalysis Basic - ( 13 Apr 2025 22:36 )    Color: x / Appearance: x / SG: x / pH: x  Gluc: 84 mg/dL / Ketone: x  / Bili: x / Urobili: x   Blood: x / Protein: x / Nitrite: x   Leuk Esterase: x / RBC: x / WBC x   Sq Epi: x / Non Sq Epi: x / Bacteria: x        Culture - Blood (collected 11 Apr 2025 16:30)  Source: Blood Blood  Preliminary Report (13 Apr 2025 22:01):    No growth at 48 Hours    Culture - Blood (collected 11 Apr 2025 16:25)  Source: Blood Blood  Preliminary Report (13 Apr 2025 22:01):    No growth at 48 Hours    Assessment and Plan:    Neurologic:  -AAOx3    #migraines  -Headache cocktail per neuro: "-First line: recommend migraine medications (to be given all together at the same time if no contraindications form comorbitities): ketorolac (Toradol) 30mg IV q8h PRN (or acetaminophen 1g IV q8h PRN), metoclopramide (Reglan) 10mg q8h PRN  -hold all benadryl until after allergy testing on 4/22    #peripheral vertigo  -meclizine 12.5 mg PRN     Cardiovascular:  #Hypotension:  -Hold bumetanide, spironolactone, sildenafil, and nifedipine.  -midodrine 20 mg TID held as not needed   -Heart failure signing off as repeat TTE similar to prior     #Right Heart Failure:  -Hold bumetanide, spironolactone, and nifedipine.  -Heart failure signing off as repeat TTE similar to prior   - s/p TTE  -MYRIAM on 4/16 for pulmonic valve eval pending      #Pulmonary Hypertension:  -Continue home Remodulin subQ 51 ng/kg/min.  -midline in place by IR on 4/11  -Check BNP  - s/p TTE  - MYRIAM on 4/16 without evidence of pulmonic damage   - Transplant Pulmonology following    #Chronic PE  - Xarelto dc'ed in anticipation potential I&D  - Xarelto restarted on 4/14 post surgical aspiration of LLQ collection    Pulmonary:  #Pulmonary Hypertension:  -Continue home Remodulin subQ 51 ng/kg/min.  -midline in place by IR on 4/11  -Check BNP  - s/p TTE  -MYRIAM on 4/16 for pulmonic valve eval pending  - Transplant Pulmonology following    #Asthma  Continue home ipratropium (Atrovent) nebulizer PRN.  Continue home budesonide/formoterol (Pulmicort), tiotropium (Spiriva), and PRN nebulizers.  Continue fluticasone (Flonase).  Maintain supplemental O2 2L NC, keep SpO2 > 92%, avoid hyperoxia.    #Chronic PE:  - Xarelto dc'ed in anticipation potential I&D  - Xarelto restarted on 4/14 post surgical aspiration of LLQ collection      #Lung Transplant Evaluation  Ongoing pre-lung transplant workup.  Transplant Pulm requesting LLQ abscess I&D for medical optimization for lung transplant candidacy   Seen by pulmonary transplant service.  Patient is being considered for inpatient transplant workup per Dr. Wise.  Needs cardiac MRI (requires transition to MRI-compatible pump—pulmonary transplant team working on this).  cardiac MRI on 4/14 @ 01:00 PM   -s/p cardiac MRI  - MRCP on 4/15  - MYRIAM  - CT colongraphy on 4/18 for further workup for iron deficiency anemia   - no evidence of polyps on CT colonography      /Renal:  -no active issues  -replete electrolytes as needed     GI:  #Abdominal Abscess  -linezolid 600 mg PO q12h for 10-14 days until 4/16 was dc'ed by ID and switched to daptomycin 6 mg/kg until 4/19  -Per general surgery, no acute surgical intervention at this time for abscess.   -Transplant Pulm requesting LLQ abscess I&D for medical optimization for lung transplant candidacy   -s/p LLQ collection aspiration on 4/13  -wound cultures grew pseudomonas -> added IV cefepime 4/16 end on 4/21    #yeast infection  -diflucan 150 mg given on 4/15    #weight loss   -on ozempic  -can restart    Hematology/Onc:  #Iron Deficiency Anemia (JULIA)  -outpatient HIE note from 03/21/25 with dx of microcytic anemia (iron deficiency vs hemoglobinopathy)  - venofer x 3 doses to end on 4/17  - f/u Hb electrophoresis   - CT colongraphy on 4/18 for further workup for iron deficiency anemia   - no evidence of polyps on CT colonography    ID:  #Abdominal Abscess  linezolid 600 mg PO q12h for 10-14 days until 4/16 was dc'ed by ID and switched to daptomycin 6 mg/kg until 4/19  wound cultures grew pseudomonas -> added IV cefepime 4/16 end on 4/21    #penicillin allergy   -per allergy note on 4/10 from inpatient team was recommending a graded challenge, appears to not have occurred as of yet     - administer 10% of standard amoxicillin dose, then observe for 30 minutes     - if tolerated, administer remaining 90% of standard amoxicillin dose, then observe for 60 minutes  - if patient does not develop immediate reactions then is considered to be not allergic and penicillin allergy can be delabeled  - Pending Penicillin allergy test on 4/22  -Hold all antihistamines until after allergy testing  -topical corticosteroids are okay for itching in the interterm s      SKIN:  #pedunculated mass on L anterior thigh  - evaluated by derm on 10/2024, no concern for malignancy, was told to follow up outpatient for removal   - plastics consulted      Patient is a 44y old  Female who presents with a chief complaint of Abdominal abscess     Overnight no acute events. Pt reports feeling fine.     MEDICATIONS  (STANDING):  buDESOnide    Inhalation Suspension 0.5 milliGRAM(s) Inhalation daily  chlorhexidine 2% Cloths 1 Application(s) Topical <User Schedule>  DAPTOmycin IVPB      DAPTOmycin IVPB 600 milliGRAM(s) IV Intermittent every 24 hours  fluticasone propionate 50 MICROgram(s)/spray Nasal Spray 1 Spray(s) Both Nostrils two times a day  heparin   Injectable 5000 Unit(s) SubCutaneous every 8 hours  influenza   Vaccine 0.5 milliLiter(s) IntraMuscular once  midodrine. 20 milliGRAM(s) Oral three times a day  montelukast 10 milliGRAM(s) Oral daily  pantoprazole    Tablet 40 milliGRAM(s) Oral before breakfast  polyethylene glycol 3350 17 Gram(s) Oral two times a day  remodulin (treprostinil) SubQ infusion 1 Dose(s) 1 Dose(s) SubCutaneous Continuous Pump  senna 2 Tablet(s) Oral at bedtime    MEDICATIONS  (PRN):  acetaminophen     Tablet .. 1000 milliGRAM(s) Oral every 6 hours PRN Mild Pain (1 - 3), Moderate Pain (4 - 6)  albuterol/ipratropium for Nebulization 3 milliLiter(s) Nebulizer every 6 hours PRN Shortness of Breath  diphenhydrAMINE Injectable 25 milliGRAM(s) IV Push every 8 hours PRN Migraine  ketorolac   Injectable 30 milliGRAM(s) IV Push every 8 hours PRN Migraine  meclizine 12.5 milliGRAM(s) Oral once PRN Dizziness  melatonin 9 milliGRAM(s) Oral at bedtime PRN Insomnia  metoclopramide 10 milliGRAM(s) Oral every 8 hours PRN Migraine  oxyCODONE    IR 2.5 milliGRAM(s) Oral every 6 hours PRN Severe Pain (7 - 10)      CAPILLARY BLOOD GLUCOSE        I&O's Summary    13 Apr 2025 07:01  -  14 Apr 2025 07:00  --------------------------------------------------------  IN: 650 mL / OUT: 400 mL / NET: 250 mL        PHYSICAL EXAM:  Vital Signs Last 24 Hrs  T(C): 36.7 (14 Apr 2025 04:00), Max: 36.9 (13 Apr 2025 16:00)  T(F): 98 (14 Apr 2025 04:00), Max: 98.4 (13 Apr 2025 16:00)  HR: 83 (14 Apr 2025 07:00) (66 - 91)  BP: 107/62 (14 Apr 2025 07:00) (81/46 - 120/57)  BP(mean): 81 (14 Apr 2025 07:00) (65 - 82)  RR: 17 (14 Apr 2025 07:00) (11 - 45)  SpO2: 100% (14 Apr 2025 07:00) (92% - 100%)    Parameters below as of 13 Apr 2025 20:00  Patient On (Oxygen Delivery Method): room air        PHYSICAL EXAM:  GENERAL: NAD, lying in bed comfortably  HEENT: NC/AT  CHEST/LUNG: CTAB, no increased WOB  HEART: RRR, no m/r/g  ABDOMEN: soft, NT, ND, BS+  NERVOUS SYSTEM:  A&Ox3    LABS:                        11.0   9.61  )-----------( 337      ( 13 Apr 2025 22:36 )             34.3     04-13    137  |  103  |  11  ----------------------------<  84  4.3   |  22  |  1.06    Ca    8.5      13 Apr 2025 22:36  Phos  3.0     04-13  Mg     2.1     04-13    TPro  6.8  /  Alb  3.8  /  TBili  0.8  /  DBili  x   /  AST  11  /  ALT  7[L]  /  AlkPhos  62  04-13    PT/INR - ( 13 Apr 2025 22:36 )   PT: 13.7 sec;   INR: 1.19 ratio         PTT - ( 13 Apr 2025 22:36 )  PTT:39.6 sec      Urinalysis Basic - ( 13 Apr 2025 22:36 )    Color: x / Appearance: x / SG: x / pH: x  Gluc: 84 mg/dL / Ketone: x  / Bili: x / Urobili: x   Blood: x / Protein: x / Nitrite: x   Leuk Esterase: x / RBC: x / WBC x   Sq Epi: x / Non Sq Epi: x / Bacteria: x        Culture - Blood (collected 11 Apr 2025 16:30)  Source: Blood Blood  Preliminary Report (13 Apr 2025 22:01):    No growth at 48 Hours    Culture - Blood (collected 11 Apr 2025 16:25)  Source: Blood Blood  Preliminary Report (13 Apr 2025 22:01):    No growth at 48 Hours    Assessment and Plan:    Mrs. 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 Initially started on Linezolid switched to Daptomycin. S/p bedside aspiration of LLQ collection 4/13 Cx growing Pseudomonas, switched to Cefepime. On going lung transplant,     ===Neuro===  -AAOx3  #migraines  - ketorolac (Toradol) 30mg IV q8h PRN (or acetaminophen 1g IV q8h PRN), metoclopramide (Reglan) 10mg q8h PRN  -hold all benadryl until after allergy testing on 4/22    #peripheral vertigo  -meclizine 12.5 mg PRN     Cardiovascular:  #Hypotension:  -Hold bumetanide, spironolactone, sildenafil, and nifedipine.  -midodrine 20 mg TID held as not needed   -Heart failure signing off as repeat TTE similar to prior   - Resolved    #Right Heart Failure:  -Hold bumetanide, spironolactone, and nifedipine.  -Heart failure signing off as repeat TTE similar to prior   - TTE: 60-65% paradoxical septal motion consistent with right ventricular volume overload, Moderate MN      #Pulmonary Hypertension:  -Continue home Remodulin subQ 51 ng/kg/min.  -Check BNP  - s/p TTE  - MYRIAM on 4/16 without evidence of pulmonic damage   - Transplant Pulmonology following    #Chronic PE  - Xarelto dc'ed in anticipation potential I&D  - Xarelto restarted on 4/14 post surgical aspiration of LLQ collection    Pulmonary:  #Pulmonary Hypertension:  -Continue home Remodulin subQ 51 ng/kg/min.  -midline in place by IR on 4/11  -Check BNP  - s/p TTE  -MYRIAM on 4/16 for pulmonic valve eval pending  - Transplant Pulmonology following    #Asthma  Continue home ipratropium (Atrovent) nebulizer PRN.  Continue home budesonide/formoterol (Pulmicort), tiotropium (Spiriva), and PRN nebulizers.  Continue fluticasone (Flonase).  Maintain supplemental O2 2L NC, keep SpO2 > 92%, avoid hyperoxia.    #Chronic PE:  - Xarelto dc'ed in anticipation potential I&D  - Xarelto restarted on 4/14 post surgical aspiration of LLQ collection      #Lung Transplant Evaluation  Ongoing pre-lung transplant workup.  Transplant Pulm requesting LLQ abscess I&D for medical optimization for lung transplant candidacy   Seen by pulmonary transplant service.  Patient is being considered for inpatient transplant workup per Dr. Wise.  Needs cardiac MRI (requires transition to MRI-compatible pump—pulmonary transplant team working on this).  cardiac MRI on 4/14 @ 01:00 PM   -s/p cardiac MRI  - MRCP on 4/15  - MYRIAM  - CT colongraphy on 4/18 for further workup for iron deficiency anemia   - no evidence of polyps on CT colonography      /Renal:  -no active issues  -replete electrolytes as needed     GI:  #Abdominal Abscess  -linezolid 600 mg PO q12h for 10-14 days until 4/16 was dc'ed by ID and switched to daptomycin 6 mg/kg until 4/19  -Per general surgery, no acute surgical intervention at this time for abscess.   -Transplant Pulm requesting LLQ abscess I&D for medical optimization for lung transplant candidacy   -s/p LLQ collection aspiration on 4/13  -wound cultures grew pseudomonas -> added IV cefepime 4/16 end on 4/21    #yeast infection  -diflucan 150 mg given on 4/15    #weight loss   -on ozempic  -can restart    Hematology/Onc:  #Iron Deficiency Anemia (JULIA)  -outpatient HIE note from 03/21/25 with dx of microcytic anemia (iron deficiency vs hemoglobinopathy)  - venofer x 3 doses to end on 4/17  - f/u Hb electrophoresis   - CT colongraphy on 4/18 for further workup for iron deficiency anemia   - no evidence of polyps on CT colonography    ID:  #Abdominal Abscess  linezolid 600 mg PO q12h for 10-14 days until 4/16 was dc'ed by ID and switched to daptomycin 6 mg/kg until 4/19  wound cultures grew pseudomonas -> added IV cefepime 4/16 end on 4/21    #penicillin allergy   -per allergy note on 4/10 from inpatient team was recommending a graded challenge, appears to not have occurred as of yet     - administer 10% of standard amoxicillin dose, then observe for 30 minutes     - if tolerated, administer remaining 90% of standard amoxicillin dose, then observe for 60 minutes  - if patient does not develop immediate reactions then is considered to be not allergic and penicillin allergy can be delabeled  - Pending Penicillin allergy test on 4/22  -Hold all antihistamines until after allergy testing  -topical corticosteroids are okay for itching in the interterm s      SKIN:  #pedunculated mass on L anterior thigh  - evaluated by derm on 10/2024, no concern for malignancy, was told to follow up outpatient for removal   - plastics consulted      Patient is a 44y old  Female who presents with a chief complaint of Abdominal abscess     Overnight no acute events. Pt reports feeling fine.     MEDICATIONS  (STANDING):  buDESOnide    Inhalation Suspension 0.5 milliGRAM(s) Inhalation daily  chlorhexidine 2% Cloths 1 Application(s) Topical <User Schedule>  DAPTOmycin IVPB      DAPTOmycin IVPB 600 milliGRAM(s) IV Intermittent every 24 hours  fluticasone propionate 50 MICROgram(s)/spray Nasal Spray 1 Spray(s) Both Nostrils two times a day  heparin   Injectable 5000 Unit(s) SubCutaneous every 8 hours  influenza   Vaccine 0.5 milliLiter(s) IntraMuscular once  midodrine. 20 milliGRAM(s) Oral three times a day  montelukast 10 milliGRAM(s) Oral daily  pantoprazole    Tablet 40 milliGRAM(s) Oral before breakfast  polyethylene glycol 3350 17 Gram(s) Oral two times a day  remodulin (treprostinil) SubQ infusion 1 Dose(s) 1 Dose(s) SubCutaneous Continuous Pump  senna 2 Tablet(s) Oral at bedtime    MEDICATIONS  (PRN):  acetaminophen     Tablet .. 1000 milliGRAM(s) Oral every 6 hours PRN Mild Pain (1 - 3), Moderate Pain (4 - 6)  albuterol/ipratropium for Nebulization 3 milliLiter(s) Nebulizer every 6 hours PRN Shortness of Breath  diphenhydrAMINE Injectable 25 milliGRAM(s) IV Push every 8 hours PRN Migraine  ketorolac   Injectable 30 milliGRAM(s) IV Push every 8 hours PRN Migraine  meclizine 12.5 milliGRAM(s) Oral once PRN Dizziness  melatonin 9 milliGRAM(s) Oral at bedtime PRN Insomnia  metoclopramide 10 milliGRAM(s) Oral every 8 hours PRN Migraine  oxyCODONE    IR 2.5 milliGRAM(s) Oral every 6 hours PRN Severe Pain (7 - 10)      CAPILLARY BLOOD GLUCOSE        I&O's Summary    13 Apr 2025 07:01  -  14 Apr 2025 07:00  --------------------------------------------------------  IN: 650 mL / OUT: 400 mL / NET: 250 mL        PHYSICAL EXAM:  Vital Signs Last 24 Hrs  T(C): 36.7 (14 Apr 2025 04:00), Max: 36.9 (13 Apr 2025 16:00)  T(F): 98 (14 Apr 2025 04:00), Max: 98.4 (13 Apr 2025 16:00)  HR: 83 (14 Apr 2025 07:00) (66 - 91)  BP: 107/62 (14 Apr 2025 07:00) (81/46 - 120/57)  BP(mean): 81 (14 Apr 2025 07:00) (65 - 82)  RR: 17 (14 Apr 2025 07:00) (11 - 45)  SpO2: 100% (14 Apr 2025 07:00) (92% - 100%)    Parameters below as of 13 Apr 2025 20:00  Patient On (Oxygen Delivery Method): room air        PHYSICAL EXAM:  GENERAL: NAD, lying in bed comfortably  HEENT: NC/AT  CHEST/LUNG: CTAB, no increased WOB  HEART: RRR, no m/r/g  ABDOMEN: soft, NT, ND, BS+  NERVOUS SYSTEM:  A&Ox3    LABS:                        11.0   9.61  )-----------( 337      ( 13 Apr 2025 22:36 )             34.3     04-13    137  |  103  |  11  ----------------------------<  84  4.3   |  22  |  1.06    Ca    8.5      13 Apr 2025 22:36  Phos  3.0     04-13  Mg     2.1     04-13    TPro  6.8  /  Alb  3.8  /  TBili  0.8  /  DBili  x   /  AST  11  /  ALT  7[L]  /  AlkPhos  62  04-13    PT/INR - ( 13 Apr 2025 22:36 )   PT: 13.7 sec;   INR: 1.19 ratio         PTT - ( 13 Apr 2025 22:36 )  PTT:39.6 sec      Urinalysis Basic - ( 13 Apr 2025 22:36 )    Color: x / Appearance: x / SG: x / pH: x  Gluc: 84 mg/dL / Ketone: x  / Bili: x / Urobili: x   Blood: x / Protein: x / Nitrite: x   Leuk Esterase: x / RBC: x / WBC x   Sq Epi: x / Non Sq Epi: x / Bacteria: x        Culture - Blood (collected 11 Apr 2025 16:30)  Source: Blood Blood  Preliminary Report (13 Apr 2025 22:01):    No growth at 48 Hours    Culture - Blood (collected 11 Apr 2025 16:25)  Source: Blood Blood  Preliminary Report (13 Apr 2025 22:01):    No growth at 48 Hours    Assessment and Plan:    Mrs. 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 Initially started on Linezolid switched to Daptomycin. S/p bedside aspiration of LLQ collection 4/13 Cx growing Pseudomonas, switched to Cefepime. Currently  ongoing lung transplant and penicillin desensitization testing to be conducted 4/22    ===Neuro===  -AAOx3  #migraines  - ketorolac (Toradol) 30mg IV q8h PRN (or acetaminophen 1g IV q8h PRN), metoclopramide (Reglan) 10mg q8h PRN  -hold all benadryl until after allergy testing on 4/22    #peripheral vertigo  -meclizine 12.5 mg PRN     ===Cardiovascular===  #Hypotension:  -Hold bumetanide, spironolactone, sildenafil, and nifedipine.  -midodrine 20 mg TID held as not needed   -Heart failure signing off as repeat TTE similar to prior   - Resolved    Right Heart Failure  -Hold bumetanide, spironolactone, and nifedipine.  -Heart failure signing off as repeat TTE similar to prior   - TTE: 60-65% paradoxical septal motion consistent with right ventricular volume overload, Moderate DE      #Pulmonary Hypertension:  -Continue home Remodulin subQ 51 ng/kg/min.  - MYRIAM on 4/16 without evidence of pulmonic damage   - Transplant Pulmonology following    #Chronic PE  - On Xerlato    ===Pulmonary===  #Pulmonary Hypertension:  -Continue home Remodulin subQ 51 ng/kg/min.  - Midline 4/11  - Transplant Pulmonology following    #Asthma  Continue home ipratropium (Atrovent) nebulizer PRN.  Continue home budesonide/formoterol (Pulmicort), tiotropium (Spiriva), and PRN nebulizers.  Continue fluticasone (Flonase).  Maintain supplemental O2 2L NC, keep SpO2 > 92%, avoid hyperoxia.    #Chronic PE:  - Xarelto dc'ed in anticipation potential I&D  - Xarelto restarted on 4/14 post surgical aspiration of LLQ collection      #Lung Transplant Evaluation  Ongoing pre-lung transplant workup.  Transplant Pulm requesting LLQ abscess I&D for medical optimization for lung transplant candidacy   Seen by pulmonary transplant service.  Patient is being considered for inpatient transplant workup per Dr. Wise.  -s/p cardiac MRI  - MRCP on 4/15  - MYRIAM  - CT colongraphy on 4/18 for further workup for iron deficiency anemia   - no evidence of polyps on CT colonography      /Renal:  -no active issues  -replete electrolytes as needed     GI:  #Abdominal Abscess  -linezolid 600 mg PO q12h for 10-14 days until 4/16 was dc'ed by ID and switched to daptomycin 6 mg/kg until 4/19  -s/p LLQ collection aspiration on 4/13  -wound cultures grew pseudomonas -> added IV cefepime 4/16 end on 4/21    #yeast infection  -diflucan 150 mg given on 4/15    #weight loss   -on ozempic  -can restart    Hematology/Onc:  #Iron Deficiency Anemia (JULIA)  -outpatient HIE note from 03/21/25 with dx of microcytic anemia (iron deficiency vs hemoglobinopathy)  - CT colongraphy on 4/18 for further workup for iron deficiency anemia   - no evidence of polyps on CT colonography    ID:  #Abdominal Abscess  linezolid 600 mg PO q12h for 10-14 days until 4/16 was dc'ed by ID and switched to daptomycin 6 mg/kg until 4/19  wound cultures grew pseudomonas -> added IV cefepime 4/16 end on 4/21    #penicillin allergy   -per allergy note on 4/10 from inpatient team was recommending a graded challenge, appears to not have occurred as of yet     - administer 10% of standard amoxicillin dose, then observe for 30 minutes     - if tolerated, administer remaining 90% of standard amoxicillin dose, then observe for 60 minutes  - if patient does not develop immediate reactions then is considered to be not allergic and penicillin allergy can be delabeled  - Pending Penicillin allergy test on 4/22  -Hold all antihistamines until after allergy testing  -topical corticosteroids are okay for itching in the interterm s      SKIN:  #pedunculated mass on L anterior thigh  - evaluated by derm on 10/2024, no concern for malignancy, was told to follow up outpatient for removal   - plastics consulted        MEDICATIONS  (STANDING):  buDESOnide    Inhalation Suspension 0.5 milliGRAM(s) Inhalation daily  chlorhexidine 2% Cloths 1 Application(s) Topical <User Schedule>  DAPTOmycin IVPB      DAPTOmycin IVPB 600 milliGRAM(s) IV Intermittent every 24 hours  fluticasone propionate 50 MICROgram(s)/spray Nasal Spray 1 Spray(s) Both Nostrils two times a day  heparin   Injectable 5000 Unit(s) SubCutaneous every 8 hours  influenza   Vaccine 0.5 milliLiter(s) IntraMuscular once  midodrine. 20 milliGRAM(s) Oral three times a day  montelukast 10 milliGRAM(s) Oral daily  pantoprazole    Tablet 40 milliGRAM(s) Oral before breakfast  polyethylene glycol  3350 17 Gram(s) Oral two times a day  remodulin (treprostinil) SubQ infusion 1 Dose(s) 1 Dose(s) SubCutaneous Continuous Pump  senna 2 Tablet(s) Oral at bedtime    MEDICATIONS  (PRN):  acetaminophen     Tablet .. 1000 milliGRAM(s) Oral every 6 hours PRN Mild Pain (1 - 3), Moderate Pain (4 - 6)  albuterol/ipratropium for Nebulization 3 milliLiter(s) Nebulizer every 6 hours PRN Shortness of Breath  diphenhydrAMINE Injectable 25 milliGRAM(s) IV Push every 8 hours PRN Migraine  ketorolac   Injectable 30 milliGRAM(s) IV Push every 8 hours PRN Migraine  meclizine 12.5 milliGRAM(s) Oral once PRN Dizziness  melatonin 9 milliGRAM(s) Oral at bedtime PRN Insomnia  metoclopramide 10 milliGRAM(s) Oral every 8 hours PRN Migraine  oxyCODONE    IR 2.5 milliGRAM(s) Oral every 6 hours PRN Severe Pain (7 - 10)      CAPILLARY BLOOD GLUCOSE        I&O's Summary    13 Apr 2025 07:01  -  14 Apr 2025 07:00  --------------------------------------------------------  IN: 650 mL / OUT: 400 mL / NET: 250 mL        PHYSICAL EXAM:  Vital Signs Last 24 Hrs  T(C): 36.7 (14 Apr 2025 04:00), Max: 36.9 (13 Apr 2025 16:00)  T(F): 98 (14 Apr 2025 04:00), Max: 98.4 (13 Apr 2025 16:00)  HR: 83 (14 Apr 2025 07:00) (66 - 91)  BP: 107/62 (14 Apr 2025 07:00) (81/46 - 120/57)  BP(mean): 81 (14 Apr 2025 07:00) (65 - 82)  RR: 17 (14 Apr 2025 07:00) (11 - 45)  SpO2: 100% (14 Apr 2025 07:00) (92% - 100%)    Parameters below as of 13 Apr 2025 20:00  Patient On (Oxygen Delivery Method): room air        PHYSICAL EXAM:  GENERAL: NAD, lying in bed comfortably  HEENT: NC/AT  CHEST/LUNG: CTAB, no increased WOB  HEART: RRR, no m/r/g  ABDOMEN: soft, NT, ND, BS+  NERVOUS SYSTEM:  A&Ox3    LABS:                        11.0   9.61  )-----------( 337      ( 13 Apr 2025 22:36 )             34.3     04-13    137  |  103  |  11  ----------------------------<  84  4.3   |  22  |  1.06    Ca    8.5      13 Apr 2025 22:36  Phos  3.0     04-13  Mg     2.1     04-13    TPro  6.8  /  Alb  3.8  /  TBili  0.8  /  DBili  x   /  AST  11  /  ALT  7[L]  /  AlkPhos  62  04-13    PT/INR - ( 13 Apr 2025 22:36 )   PT: 13.7 sec;   INR: 1.19 ratio         PTT - ( 13 Apr 2025 22:36 )  PTT:39.6 sec      Urinalysis Basic - ( 13 Apr 2025 22:36 )    Color: x / Appearance: x / SG: x / pH: x  Gluc: 84 mg/dL / Ketone: x  / Bili: x / Urobili: x   Blood: x / Protein: x / Nitrite: x   Leuk Esterase: x / RBC: x / WBC x   Sq Epi: x / Non Sq Epi: x / Bacteria: x        Culture - Blood (collected 11 Apr 2025 16:30)  Source: Blood Blood  Preliminary Report (13 Apr 2025 22:01):    No growth at 48 Hours    Culture - Blood (collected 11 Apr 2025 16:25)  Source: Blood Blood  Preliminary Report (13 Apr 2025 22:01):    No growth at 48 Hours    Assessment and Plan:    Mrs. 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 Initially started on Linezolid switched to Daptomycin. S/p bedside aspiration of LLQ collection 4/13 Cx growing Pseudomonas, switched to Cefepime. Currently  ongoing lung transplant and penicillin desensitization testing to be conducted 4/22    ===Neuro===  -AAOx3  #migraines  - ketorolac (Toradol) 30mg IV q8h PRN (or acetaminophen 1g IV q8h PRN), metoclopramide (Reglan) 10mg q8h PRN  -hold all benadryl until after allergy testing on 4/22    #peripheral vertigo  -meclizine 12.5 mg PRN     ===Cardiovascular===    # Hx of Right Heart Failure  -Hold bumetanide, spironolactone, and nifedipine.  -Heart failure signing off as repeat TTE similar to prior   - TTE: 60-65% paradoxical septal motion consistent with right ventricular volume overload, Moderate NH      #Pulmonary Hypertension:  -Continue home Remodulin subQ 51 ng/kg/min.  - MYRIAM on 4/16 without evidence of pulmonic damage   - Transplant Pulmonology following    #Chronic PE  - On Xarelto    ===Pulmonary===  #Pulmonary Hypertension:  -Continue home Remodulin subQ 51 ng/kg/min.  - Midline 4/11  - Transplant Pulmonology following    #Asthma  Continue home ipratropium (Atrovent) nebulizer PRN.  Continue home budesonide/formoterol (Pulmicort), tiotropium (Spiriva), and PRN nebulizers.  Continue fluticasone (Flonase).  Maintain supplemental O2 2L NC, keep SpO2 > 92%, avoid hyperoxia.      #Lung Transplant Evaluation  Ongoing pre-lung transplant workup.  Transplant Pulm requesting LLQ abscess I&D for medical optimization for lung transplant candidacy   Seen by pulmonary transplant service.  Patient is being considered for inpatient transplant workup per Dr. Wise.  -s/p cardiac MRI  -s/p CT colo    ===/Renal===  - no active issues    ===GI===  - no active issues    ===Hematology/Onc===  #Iron Deficiency Anemia (JULIA)  - CT colongraphy on 4/18 for further workup for iron deficiency anemia   - no evidence of polyps on CT colonography    ===ID===  #Abdominal Abscess  - Previously on linezolid 600 mg PO q12h 4/16, switched to daptomycin 6 mg/kg until 4/19  - s/p LLQ collection aspiration on 4/13 Cx growing pseudomonas, Cefepime until 4/21    #penicillin allergy   - Pending Penicillin allergy test on 4/22  -per allergy note on 4/10 from inpatient team was recommending a graded challenge, appears to not have occurred as of yet  - administer 10% of standard amoxicillin dose, then observe for 30 minutes  - if tolerated, administer remaining 90% of standard amoxicillin dose, then observe for 60 minutes  - if patient does not develop immediate reactions then is considered to be not allergic and penicillin allergy can be delabeled  -topical corticosteroids are okay for itching in the interterm s      SKIN:  #pedunculated mass on L anterior thigh  - evaluated by derm on 10/2024, no concern for malignancy, was told to follow up outpatient for removal   - ketorolac (Toradol) 30mg IV q8h PRN (or acetaminophen 1g IV q8h PRN), for pain relief

## 2025-04-21 NOTE — PROGRESS NOTE ADULT - ASSESSMENT
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 This hospitalization she has had hypotension to low 80s (starting midodrine 4/9)  Neurology consulted 4/9/25 for headaches and vertigo.  -Headaches similar to prior but more frequent and more severe. +throbbing +photophobia . Responds to oxycodone / hydromorphone but not acetaminophen  -Vertigo is positional and paroxysmal. Responded well to meclizine 12.5mg x 1  -Neuro exam with left corrective saccade on right head impulse, only end gaze nystagmus, and no skew   She has chronic headaches for last 2 years. Headaches were holocephalic, perhaps worse posteriorly, throbb  CTH (4/7/25) unrevealing  s/p LLQ collection aspiration   s/p MRCP 4/15     Impression:  1) Migraine without aura, worse in setting of acute medical illness; imporved   2) Peripheral vertigo; better    Recommendations  #Migraine without aura  Baseline 1 episode per week, now daily and more intense while hospitalized responsive to opioids here, less so to acetaminophen   -Consider non-opioid treatment of head pain  -First line: recommend migraine medications (to be given all together at the same time if no contraindications form comorbitities): ketorolac (Toradol) 30mg IV q8h PRN (or acetaminophen 1g IV q8h PRN), metoclopramide (Reglan) 10mg q8h PRN, diphenhydramine (Benadryl) 25mg IV q8h PRN. Please repeat at least 2-3 cycles for medications to be effective.  -IV hydration, Mg 2g IV x1    #Peripheral Vertigo  -Physical therapy  -Vestibular Rehab Referral  -ENT Referral  -[email or web address] for patient education  -Check orthostatic vitals   -IV Fluids as safe/able (after orthostatic vitals) consider bolus and then maintenance fluids if no contraindications  -Meclizine PRN, she responded well to 1x dose of 12.5mg    -remaining per primary team/ICU   - lung transplant evaluation in progress   - cefepime per ID   - xarelto for PE   Triston Alexandre, MD  Vascular Neurology  Office: [phone] .

## 2025-04-21 NOTE — PROGRESS NOTE ADULT - SUBJECTIVE AND OBJECTIVE BOX
Neurology Progress Note    S: Patient seen and examined.   in ICU. no HA           Medications: MEDICATIONS  (STANDING):  cefepime   IVPB 2000 milliGRAM(s) IV Intermittent every 8 hours  cefepime   IVPB      chlorhexidine 2% Cloths 1 Application(s) Topical <User Schedule>  fluticasone propionate 50 MICROgram(s)/spray Nasal Spray 1 Spray(s) Both Nostrils two times a day  ipratropium    for Nebulization 500 MICROGram(s) Nebulizer every 6 hours  ketorolac   Injectable 30 milliGRAM(s) IV Push once  montelukast 10 milliGRAM(s) Oral daily  pantoprazole    Tablet 40 milliGRAM(s) Oral before breakfast  remodulin (treprostinil) SubQ infusion 1 Dose(s) 1 Dose(s) SubCutaneous Continuous Pump  rivaroxaban 15 milliGRAM(s) Oral with dinner    MEDICATIONS  (PRN):  acetaminophen     Tablet .. 1000 milliGRAM(s) Oral every 6 hours PRN Mild Pain (1 - 3)  ketorolac   Injectable 30 milliGRAM(s) IV Push every 8 hours PRN Moderate Pain (4 - 6)  meclizine 12.5 milliGRAM(s) Oral once PRN Dizziness  melatonin 9 milliGRAM(s) Oral at bedtime PRN Insomnia  metoclopramide 10 milliGRAM(s) Oral every 8 hours PRN Migraine       Vitals:  Vital Signs Last 24 Hrs  T(C): 36.5 (21 Apr 2025 04:00), Max: 36.9 (20 Apr 2025 16:00)  T(F): 97.7 (21 Apr 2025 04:00), Max: 98.4 (20 Apr 2025 16:00)  HR: 79 (21 Apr 2025 08:00) (79 - 103)  BP: 113/71 (21 Apr 2025 08:00) (100/72 - 119/74)  BP(mean): 86 (21 Apr 2025 08:00) (72 - 92)  RR: 20 (21 Apr 2025 08:00) (16 - 23)  SpO2: 99% (21 Apr 2025 08:00) (93% - 100%)    Parameters below as of 20 Apr 2025 23:09  Patient On (Oxygen Delivery Method): nasal cannula          General Exam:   General Appearance: Appropriately dressed and in no acute distress       Head: Normocephalic, atraumatic and no dysmorphic features  Ear, Nose, and Throat: Moist mucous membranes  CVS: S1S2+  Resp: No SOB, no wheeze or rhonchi  Abd: soft NTND  Extremities: No edema, no cyanosis  Skin: No bruises, no rashes     Neurological Exam:       level of consciousness: Awake, alert  Orientation: Oriented to person, age, place, and time  Language: Speech is fluent with intact naming, repetition, and ability to follow commands (including simple commands and complex cross commands)  Cortical Signs:  no hemineglect.   Content: Good overall fund of knowledge (aware of current events,and relevant past history)    HINTS+; improved      Hearing intact and  symmetric to finger rub b/l as above    - Cranial Nerves:   PERRL, VFF, EOMI, facial sensation is intact in the V1-V3 distribution bilaterally; face is symmetric with augusto smile; hearing is intact to finger rub bilaterally and equally; uvula is midline and soft palate rises symmetrically; shoulder shrug intact; tongue protrudes in the midline with intact lateral movements    - Motor Testing:  Bulk: Normal   Tone: Normal  Strength:  There is no pronator drift b/l  There is no leg drift b/l  NO orbiting  intact rapid finger rapping                              Right           Left  Should-Abduct      5                   5   -some pain limitations  Elbow-Flex             5                   5  Elbow-Ext              5                   5                        5                   5    Hip-Flex                 5                   5  Knee-Flex              5                   5  Knee-Ext                5                   5  Dorsiflex                5                   5  Plantarflex             5                   5    - Reflexes:              Right           Left  Triceps                     2+                2+  Biceps                      2+                 2+  Brachioradialis         2+                2+  Patellar                    2+                 2+    requires distraction to elicit all reflexes    - Sensory: Intact throughout to light touch.   - Coordination: Finger-nose-finger intact without dysmetria.   ROmberg negative   - Gait: Normal steps, base, arm swing, and turning.  Able to tandem walk. Able to heel and toe walk.    I personally reviewed the below data/images/labs:        LABS:                          9.3    5.79  )-----------( 193      ( 19 Apr 2025 22:40 )             29.9     04-19    137  |  110[H]  |  6[L]  ----------------------------<  89  3.9   |  17[L]  |  0.91    Ca    7.6[L]      19 Apr 2025 22:40  Phos  2.4     04-19  Mg     1.9     04-19    TPro  5.9[L]  /  Alb  3.4  /  TBili  0.3  /  DBili  x   /  AST  15  /  ALT  11  /  AlkPhos  51  04-19    LIVER FUNCTIONS - ( 19 Apr 2025 22:40 )  Alb: 3.4 g/dL / Pro: 5.9 g/dL / ALK PHOS: 51 U/L / ALT: 11 U/L / AST: 15 U/L / GGT: x           PT/INR - ( 19 Apr 2025 22:40 )   PT: 22.3 sec;   INR: 1.95 ratio         PTT - ( 19 Apr 2025 22:40 )  PTT:50.9 sec  Urinalysis Basic - ( 19 Apr 2025 22:40 )    Color: x / Appearance: x / SG: x / pH: x  Gluc: 89 mg/dL / Ketone: x  / Bili: x / Urobili: x   Blood: x / Protein: x / Nitrite: x   Leuk Esterase: x / RBC: x / WBC x   Sq Epi: x / Non Sq Epi: x / Bacteria: x                  < from: CT Head No Cont (04.07.25 @ 10:22) >    ACC: [id number] EXAM:  CT BRAIN   ORDERED BY:  SERINA BOWEN     PROCEDURE DATE:  04/07/2025          INTERPRETATION:  CLINICAL INFORMATION: headaches    COMPARISON: CT head 10/23/2024    CONTRAST:  IV Contrast: NONE  .    TECHNIQUE:  Serial axial images were obtained from the skull base to the   vertex using multi-slice helical technique. Sagittal and coronal   reformats were obtained.    FINDINGS:    VENTRICLES AND SULCI: Normal in size and configuration.  INTRA-AXIAL: No mass effect, acute hemorrhage, or midline shift.  EXTRA-AXIAL: No mass or fluid collection. Basal cisterns are normal in   appearance.    VISUALIZED SINUSES:  Clear.  TYMPANOMASTOID CAVITIES:  Clear.  VISUALIZED ORBITS: Normal.  CALVARIUM: Intact.    MISCELLANEOUS: None.      IMPRESSION:  No acute intracranial hemorrhage, mass effect, or midline shift.        --- End of Report ---            AAKASH SIMON MD; Attending Radiologist  This document has been electronically signed. Apr 7 2025 11:05AM    < end of copied text >

## 2025-04-21 NOTE — CHART NOTE - NSCHARTNOTEFT_GEN_A_CORE
NUTRITION FOLLOW UP NOTE    PATIENT SEEN FOR: nutrition follow up     SOURCE: [x] Patient  [x] Current Medical Record  [x] RN  [] Family/support person at bedside  [] Patient unavailable/inappropriate  [] Other:    CHART REVIEWED/EVENTS NOTED.  [] No changes to nutrition care plan to note  [x] Nutrition Status:  - Hx R HF  - Hx pHTN, under evaluation for lung transplant    DIET ORDER:   Diet, Regular (25)    CURRENT DIET ORDER IS:  [] Appropriate:  [] Inadequate:  [x] Other: see recommendations     NUTRITION INTAKE/PROVISION:  [x] PO: Pt reports good appetite/po intake, observed 100% intake of breakfast tray at time of visit. Pt still amenable to receive Ensure Max as recommended by RD on 4/15.   [] Enteral Nutrition:  [] Parenteral Nutrition:    ANTHROPOMETRICS:  Drug Dosing Weight  Height (cm): 165.1 (2025 12:53)  Weight (kg): 92.8 (2025 12:53)  BMI (kg/m2): 34 (2025 12:53)    Weights:   Daily/weekly weights in k.3 (, standing), 94.9 (, standing)  RD to continue to monitor weight trends as able/available.      MEDICATIONS:  MEDICATIONS  (STANDING):  cefepime   IVPB 2000 milliGRAM(s) IV Intermittent every 8 hours  cefepime   IVPB      chlorhexidine 2% Cloths 1 Application(s) Topical <User Schedule>  fluticasone propionate 50 MICROgram(s)/spray Nasal Spray 1 Spray(s) Both Nostrils two times a day  ipratropium    for Nebulization 500 MICROGram(s) Nebulizer every 6 hours  montelukast 10 milliGRAM(s) Oral daily  pantoprazole    Tablet 40 milliGRAM(s) Oral before breakfast  remodulin (treprostinil) SubQ infusion 1 Dose(s) 1 Dose(s) SubCutaneous Continuous Pump  rivaroxaban 15 milliGRAM(s) Oral with dinner    MEDICATIONS  (PRN):  acetaminophen     Tablet .. 1000 milliGRAM(s) Oral every 6 hours PRN Mild Pain (1 - 3)  ketorolac   Injectable 30 milliGRAM(s) IV Push every 8 hours PRN Moderate Pain (4 - 6)  meclizine 12.5 milliGRAM(s) Oral once PRN Dizziness  melatonin 9 milliGRAM(s) Oral at bedtime PRN Insomnia  metoclopramide 10 milliGRAM(s) Oral every 8 hours PRN Migraine      NUTRITIONALLY PERTINENT LABS:   Na137 mmol/L Glu 89 mg/dL K+ 3.9 mmol/L Cr  0.91 mg/dL BUN 6 mg/dL[L]    Phos 2.4 mg/dL[L]   Alb 3.4 g/dL    Chol 122 mg/dL LDL --    HDL 37 mg/dL[L] Trig 69 mg/dL   ALT 11 U/L AST 15 U/L Alkaline Phosphatase 51 U/L    Finger Sticks:  POCT Blood Glucose.: 100 mg/dL ( @ 06:26)      NUTRITIONALLY PERTINENT MEDICATIONS/LABS:  [x] Reviewed  [x] Relevant notes on medications/labs:  - Per chart, pt ok to self administer home Ozempic    EDEMA:  [x] Reviewed  [x] Relevant notes:  - None noted as per flowsheets    GI/ I&O:  [x] Reviewed  [] Relevant notes:  [] Other:    SKIN:   [x] No pressure injuries documented, per nursing flowsheet  [] Pressure injury previously noted  [] Change in pressure injury documentation:  [] Other:    ESTIMATED NEEDS:  [x] No change:  [] Updated:  Energy:  0978-2566 kcal/day (27-32 kcal/kg)  Protein:  68-80 g/day (1.2-1.4 g/kg)  Fluid:   ml/day or [x] defer to team  Based on:  lbs/56.6 kg    NUTRITION DIAGNOSIS:  [x] Prior Dx: Inadequate Protein Energy Intake  [] New Dx:    EDUCATION:  [x] Yes: Discussed importance of adequate protein-energy consumption to meet estimated nutrient needs. Encouraged self-limiting of sodium intake. Also instructed pt on meal ordering procedures. Pt noted with good comprehension and made aware RD remains available for further questions/concerns.   [] Not appropriate/warranted    NUTRITION CARE PLAN:  1. Diet: Continue Regular diet   2. Supplements: Add Ensure Max 1x daily to promote optimal protein intake.     [] Achieved - Continue current nutrition intervention(s)  [] Current medical condition precludes nutrition intervention at this time.    MONITORING AND EVALUATION:   RD remains available upon request and will follow up per protocol.    Nancy Gale, SULTANAN, CDN (Available via [employer] TEAMS)

## 2025-04-22 PROCEDURE — 99233 SBSQ HOSP IP/OBS HIGH 50: CPT | Mod: GC

## 2025-04-22 PROCEDURE — 99232 SBSQ HOSP IP/OBS MODERATE 35: CPT

## 2025-04-22 RX ORDER — ACETAMINOPHEN 500 MG/5ML
1000 LIQUID (ML) ORAL ONCE
Refills: 0 | Status: COMPLETED | OUTPATIENT
Start: 2025-04-22 | End: 2025-04-23

## 2025-04-22 RX ORDER — AMOXICILLIN 500 MG/1
50 CAPSULE ORAL ONCE
Refills: 0 | Status: COMPLETED | OUTPATIENT
Start: 2025-04-22 | End: 2025-04-22

## 2025-04-22 RX ORDER — AMOXICILLIN 500 MG/1
450 CAPSULE ORAL ONCE
Refills: 0 | Status: COMPLETED | OUTPATIENT
Start: 2025-04-22 | End: 2025-04-22

## 2025-04-22 RX ORDER — AMOXICILLIN 500 MG/1
1 CAPSULE ORAL ONCE
Refills: 0 | Status: DISCONTINUED | OUTPATIENT
Start: 2025-04-22 | End: 2025-04-22

## 2025-04-22 RX ORDER — DIPHENHYDRAMINE HCL 12.5MG/5ML
25 ELIXIR ORAL EVERY 4 HOURS
Refills: 0 | Status: DISCONTINUED | OUTPATIENT
Start: 2025-04-22 | End: 2025-04-24

## 2025-04-22 RX ORDER — POLYETHYLENE GLYCOL 3350 17 G/17G
17 POWDER, FOR SOLUTION ORAL DAILY
Refills: 0 | Status: DISCONTINUED | OUTPATIENT
Start: 2025-04-22 | End: 2025-04-24

## 2025-04-22 RX ADMIN — Medication 9 MILLIGRAM(S): at 21:04

## 2025-04-22 RX ADMIN — Medication 40 MILLIGRAM(S): at 06:04

## 2025-04-22 RX ADMIN — AMOXICILLIN 50 MILLIGRAM(S): 500 CAPSULE ORAL at 12:33

## 2025-04-22 RX ADMIN — Medication 10 MILLIGRAM(S): at 18:55

## 2025-04-22 RX ADMIN — KETOROLAC TROMETHAMINE 30 MILLIGRAM(S): 30 INJECTION, SOLUTION INTRAMUSCULAR; INTRAVENOUS at 18:56

## 2025-04-22 RX ADMIN — MONTELUKAST SODIUM 10 MILLIGRAM(S): 10 TABLET ORAL at 12:49

## 2025-04-22 RX ADMIN — Medication 500 MICROGRAM(S): at 11:11

## 2025-04-22 RX ADMIN — AMOXICILLIN 450 MILLIGRAM(S): 500 CAPSULE ORAL at 13:11

## 2025-04-22 RX ADMIN — RIVAROXABAN 15 MILLIGRAM(S): 10 TABLET, FILM COATED ORAL at 17:29

## 2025-04-22 RX ADMIN — FLUTICASONE PROPIONATE 1 SPRAY(S): 50 SPRAY, METERED NASAL at 17:29

## 2025-04-22 RX ADMIN — Medication 1 APPLICATION(S): at 06:04

## 2025-04-22 RX ADMIN — Medication 500 MICROGRAM(S): at 23:23

## 2025-04-22 RX ADMIN — Medication 500 MICROGRAM(S): at 05:37

## 2025-04-22 RX ADMIN — FLUTICASONE PROPIONATE 1 SPRAY(S): 50 SPRAY, METERED NASAL at 06:04

## 2025-04-22 RX ADMIN — Medication 25 MILLIGRAM(S): at 18:56

## 2025-04-22 RX ADMIN — KETOROLAC TROMETHAMINE 30 MILLIGRAM(S): 30 INJECTION, SOLUTION INTRAMUSCULAR; INTRAVENOUS at 21:01

## 2025-04-22 NOTE — PROGRESS NOTE ADULT - ASSESSMENT
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 This hospitalization she has had hypotension to low 80s (starting midodrine 4/9)  Neurology consulted 4/9/25 for headaches and vertigo.  -Headaches similar to prior but more frequent and more severe. +throbbing +photophobia . Responds to oxycodone / hydromorphone but not acetaminophen  -Vertigo is positional and paroxysmal. Responded well to meclizine 12.5mg x 1  -Neuro exam with left corrective saccade on right head impulse, only end gaze nystagmus, and no skew   She has chronic headaches for last 2 years. Headaches were holocephalic, perhaps worse posteriorly, throbb  CTH (4/7/25) unrevealing  s/p LLQ collection aspiration   s/p MRCP 4/15   c/o LE pain     Impression:  1) Migraine without aura, worse in setting of acute medical illness; imporved   2) Peripheral vertigo; better    Recommendations  #Migraine without aura  Baseline 1 episode per week, now daily and more intense while hospitalized responsive to opioids here, less so to acetaminophen   -Consider non-opioid treatment of head pain  -First line: recommend migraine medications (to be given all together at the same time if no contraindications form comorbitities): ketorolac (Toradol) 30mg IV q8h PRN (or acetaminophen 1g IV q8h PRN), metoclopramide (Reglan) 10mg q8h PRN, diphenhydramine (Benadryl) 25mg IV q8h PRN. Please repeat at least 2-3 cycles for medications to be effective.  -IV hydration, Mg 2g IV x1    #Peripheral Vertigo  -Physical therapy  -Vestibular Rehab Referral  -ENT Referral  -[email or web address] for patient education  -Check orthostatic vitals   -IV Fluids as safe/able (after orthostatic vitals) consider bolus and then maintenance fluids if no contraindications  -Meclizine PRN, she responded well to 1x dose of 12.5mg    -remaining per primary team/ICU   - lung transplant evaluation in progress   - cefepime per ID   - xarelto for PE   Triston DEL TORO. Pishanidar, MD  Vascular Neurology  Office: [phone] .

## 2025-04-22 NOTE — PROGRESS NOTE ADULT - SUBJECTIVE AND OBJECTIVE BOX
INFECTIOUS DISEASES FOLLOW UP-- Aleja Jauregui  [phone]    This is a follow up note for this  44yFemale with  Cellulitis        ROS:  CONSTITUTIONAL:  No fever, good appetite  CARDIOVASCULAR:  No chest pain or palpitations  RESPIRATORY:  No dyspnea  GASTROINTESTINAL:  No nausea, vomiting, diarrhea, or abdominal pain  GENITOURINARY:  No dysuria  NEUROLOGIC:  No headache,     Allergies    vancomycin (Rash)  adhesives (Rash)    Intolerances        ANTIBIOTICS/RELEVANT:  antimicrobials    immunologic:    OTHER:  acetaminophen     Tablet .. 1000 milliGRAM(s) Oral every 6 hours PRN  cetirizine 10 milliGRAM(s) Oral daily PRN  chlorhexidine 2% Cloths 1 Application(s) Topical <User Schedule>  diphenhydrAMINE 25 milliGRAM(s) Oral every 4 hours PRN  EPINEPHrine     1 mG/mL Injectable 0.3 milliGRAM(s) IntraMuscular once PRN  fluticasone propionate 50 MICROgram(s)/spray Nasal Spray 1 Spray(s) Both Nostrils two times a day  ipratropium    for Nebulization 500 MICROGram(s) Nebulizer every 6 hours  ketorolac   Injectable 30 milliGRAM(s) IV Push every 8 hours PRN  meclizine 12.5 milliGRAM(s) Oral once PRN  melatonin 9 milliGRAM(s) Oral at bedtime PRN  metoclopramide 10 milliGRAM(s) Oral every 8 hours PRN  montelukast 10 milliGRAM(s) Oral daily  pantoprazole    Tablet 40 milliGRAM(s) Oral before breakfast  polyethylene glycol 3350 17 Gram(s) Oral daily PRN  remodulin (treprostinil) SubQ infusion 1 Dose(s) 1 Dose(s) SubCutaneous Continuous Pump  rivaroxaban 15 milliGRAM(s) Oral with dinner      Objective:  Vital Signs Last 24 Hrs  T(C): 37.5 (22 Apr 2025 18:00), Max: 37.7 (22 Apr 2025 08:00)  T(F): 99.5 (22 Apr 2025 18:00), Max: 99.8 (22 Apr 2025 08:00)  HR: 103 (22 Apr 2025 18:00) (76 - 104)  BP: 104/67 (22 Apr 2025 18:00) (93/61 - 114/75)  BP(mean): 81 (22 Apr 2025 18:00) (72 - 88)  RR: 31 (22 Apr 2025 18:00) (13 - 42)  SpO2: 95% (22 Apr 2025 18:00) (95% - 100%)    Parameters below as of 22 Apr 2025 11:23  Patient On (Oxygen Delivery Method): room air        PHYSICAL EXAM:  Constitutional:no acute distress  Eyes:DEMETRIUS, EOMI  Ear/Nose/Throat: no oral lesions, 	  Respiratory: clear BL  Cardiovascular: S1S2  Gastrointestinal:soft, (+) BS, no tenderness  granulation tissue under open abdominal wound/sores  Extremities:no e/e/c  No Lymphadenopathy  IV sites not inflammed.    LABS:    none today            MICROBIOLOGY:      Culture - Wound Aerobic/Anaerobic (04.13.25 @ 14:21)    -  Meropenem: S <=1   -  Piperacillin/Tazobactam: S <=8   -  Aztreonam: S <=4   -  Cefepime: S <=2   -  Ceftazidime: S <=1   -  Ciprofloxacin: S <=0.25   -  Imipenem: S <=1   -  Levofloxacin: S <=0.5   Specimen Source: Exudate Incision   Culture Results:   Rare Pseudomonas aeruginosa   Organism Identification: Pseudomonas aeruginosa   Organism: Pseudomonas aeruginosa   Method Type: BRIAN          RECENT CULTURES:      RADIOLOGY & ADDITIONAL STUDIES:    < from: CT Colonography, Diagnosis (04.18.25 @ 16:41) >    IMPRESSION:  No colonic polyp or mass.    < end of copied text >  < from: MR Cardiac w/wo IV Cont (04.14.25 @ 15:41) >  IMPRESSION:    MAIN PULMONARY ARTERY: Severely enlarged pulmonary trunk measuring 5.2 cm   with central flow artifact on black blood images, reflecting known   pulmonary arterial hypertension. Bilateral main pulmonary arteries are   also significantly dilated and likely aneurysmal in the left, better seen   in prior chest CT from 10/30/2024. Phase contrast was not performed.    LEFT VENTRICLE: Normal in size based on LVEDVI. Systolic function is   globally normal, 65% ejection fraction. Septal flattening/bounce is   likely related to elevated right heart pressures. No clear evidence of   myocardial edema, though T2 evaluation is limited by artifact. Delayed   enhancement is noted along inferior artery insertional site, nonischemic.    RIGHT VENTRICLE: Mildly enlarged based on RVEDVI. Systolic function is   globally mildly depressed, 42% ejection fraction. There is right   ventricular hypertrophy. Abnormal septal motion as described above.   Delayed signal noted at the RVOT/pulmonary arterial tree.    VALVES: Flow acceleration across the pulmonic valve with prominent   pulmonic valve regurgitant jet.    NON-CARDIAC: Trace right pleural effusion. Lung parenchyma is   incompletely characterized on MRI. Correlate with dedicated chest CT as   clinically warranted.    < end of copied text >

## 2025-04-22 NOTE — PROGRESS NOTE ADULT - SUBJECTIVE AND OBJECTIVE BOX
INTERVAL HPI/OVERNIGHT EVENTS: 1x Tylenol ON for pain      SUBJECTIVE: Patient seen and examined at bedside. Continued pain in the lower extremity    ROS: All negative except as listed above.    VITAL SIGNS:  ICU Vital Signs Last 24 Hrs  T(C): 36.5 (21 Apr 2025 04:00), Max: 36.9 (20 Apr 2025 16:00)  T(F): 97.7 (21 Apr 2025 04:00), Max: 98.4 (20 Apr 2025 16:00)  HR: 103 (21 Apr 2025 04:00) (83 - 103)  BP: 100/72 (21 Apr 2025 04:00) (100/72 - 119/74)  BP(mean): 80 (21 Apr 2025 04:00) (72 - 92)  ABP: --  ABP(mean): --  RR: 16 (21 Apr 2025 04:00) (16 - 23)  SpO2: 94% (21 Apr 2025 04:00) (93% - 100%)    O2 Parameters below as of 20 Apr 2025 23:09  Patient On (Oxygen Delivery Method): nasal cannula            Plateau pressure:   P/F ratio:     04-19 @ 07:01  -  04-20 @ 07:00  --------------------------------------------------------  IN: 600 mL / OUT: 400 mL / NET: 200 mL    04-20 @ 07:01  -  04-21 @ 06:53  --------------------------------------------------------  IN: 850 mL / OUT: 0 mL / NET: 850 mL      CAPILLARY BLOOD GLUCOSE      POCT Blood Glucose.: 100 mg/dL (21 Apr 2025 06:26)      ECG: reviewed.    PHYSICAL EXAM:    GENERAL: NAD, lying in bed comfortably  HEAD:  Atraumatic, normocephalic  EYES: EOMI, PERRLA, conjunctiva and sclera clear  NECK: Supple, trachea midline, no JVD  HEART: Regular rate and rhythm, no murmurs, rubs, or gallops  LUNGS: Unlabored respirations.  Clear to auscultation bilaterally, no crackles, wheezing, or rhonchi  ABDOMEN: Soft, nontender, nondistended, +BS  EXTREMITIES: 2+ peripheral pulses bilaterally, cap refill<2 secs. No clubbing, cyanosis, or edema  NERVOUS SYSTEM:  A&Ox3, following commands, moving all extremities, no focal deficits   SKIN: No rashes or lesions    MEDICATIONS:  MEDICATIONS  (STANDING):  cefepime   IVPB 2000 milliGRAM(s) IV Intermittent every 8 hours  cefepime   IVPB      chlorhexidine 2% Cloths 1 Application(s) Topical <User Schedule>  fluticasone propionate 50 MICROgram(s)/spray Nasal Spray 1 Spray(s) Both Nostrils two times a day  ipratropium    for Nebulization 500 MICROGram(s) Nebulizer every 6 hours  montelukast 10 milliGRAM(s) Oral daily  pantoprazole    Tablet 40 milliGRAM(s) Oral before breakfast  remodulin (treprostinil) SubQ infusion 1 Dose(s) 1 Dose(s) SubCutaneous Continuous Pump  rivaroxaban 15 milliGRAM(s) Oral with dinner    MEDICATIONS  (PRN):  acetaminophen     Tablet .. 1000 milliGRAM(s) Oral every 6 hours PRN Mild Pain (1 - 3)  ketorolac   Injectable 30 milliGRAM(s) IV Push every 8 hours PRN Moderate Pain (4 - 6)  meclizine 12.5 milliGRAM(s) Oral once PRN Dizziness  melatonin 9 milliGRAM(s) Oral at bedtime PRN Insomnia  metoclopramide 10 milliGRAM(s) Oral every 8 hours PRN Migraine      ALLERGIES:  Allergies    vancomycin (Rash)  penicillin (Rash)  adhesives (Rash)    Intolerances        LABS:                        9.3    5.79  )-----------( 193      ( 19 Apr 2025 22:40 )             29.9     04-19    137  |  110[H]  |  6[L]  ----------------------------<  89  3.9   |  17[L]  |  0.91    Ca    7.6[L]      19 Apr 2025 22:40  Phos  2.4     04-19  Mg     1.9     04-19    TPro  5.9[L]  /  Alb  3.4  /  TBili  0.3  /  DBili  x   /  AST  15  /  ALT  11  /  AlkPhos  51  04-19    PT/INR - ( 19 Apr 2025 22:40 )   PT: 22.3 sec;   INR: 1.95 ratio         PTT - ( 19 Apr 2025 22:40 )  PTT:50.9 sec  Urinalysis Basic - ( 19 Apr 2025 22:40 )    Color: x / Appearance: x / SG: x / pH: x  Gluc: 89 mg/dL / Ketone: x  / Bili: x / Urobili: x   Blood: x / Protein: x / Nitrite: x   Leuk Esterase: x / RBC: x / WBC x   Sq Epi: x / Non Sq Epi: x / Bacteria: x      ABG:      vBG:    Micro:    Culture - Blood (collected 04-11-25 @ 16:30)  Source: Blood Blood  Final Report (04-16-25 @ 22:00):    No growth at 5 days    Culture - Blood (collected 04-11-25 @ 16:25)  Source: Blood Blood  Final Report (04-16-25 @ 22:00):    No growth at 5 days    Culture - Blood (collected 04-08-25 @ 13:00)  Source: Blood Blood  Final Report (04-13-25 @ 18:00):    No growth at 5 days    Culture - Blood (collected 04-08-25 @ 12:55)  Source: Blood Blood  Final Report (04-13-25 @ 18:00):    No growth at 5 days    Culture - Blood (collected 04-02-25 @ 12:30)  Source: Blood Blood-Peripheral  Final Report (04-07-25 @ 18:01):    No growth at 5 days    Culture - Blood (collected 04-02-25 @ 12:15)  Source: Blood Blood-Peripheral  Final Report (04-07-25 @ 18:01):    No growth at 5 days       Urinalysis with Rflx Culture (collected 04-02-25 @ 15:13)         RADIOLOGY & ADDITIONAL TESTS: Reviewed.   INTERVAL HPI/OVERNIGHT EVENTS: 1x Tylenol ON for pain      SUBJECTIVE: Patient seen and examined at bedside. No acute concerns at bedside    ROS: All negative except as listed above.    VITAL SIGNS:  ICU Vital Signs Last 24 Hrs  T(C): 36.5 (21 Apr 2025 04:00), Max: 36.9 (20 Apr 2025 16:00)  T(F): 97.7 (21 Apr 2025 04:00), Max: 98.4 (20 Apr 2025 16:00)  HR: 103 (21 Apr 2025 04:00) (83 - 103)  BP: 100/72 (21 Apr 2025 04:00) (100/72 - 119/74)  BP(mean): 80 (21 Apr 2025 04:00) (72 - 92)  ABP: --  ABP(mean): --  RR: 16 (21 Apr 2025 04:00) (16 - 23)  SpO2: 94% (21 Apr 2025 04:00) (93% - 100%)    O2 Parameters below as of 20 Apr 2025 23:09  Patient On (Oxygen Delivery Method): nasal cannula            Plateau pressure:   P/F ratio:     04-19 @ 07:01  -  04-20 @ 07:00  --------------------------------------------------------  IN: 600 mL / OUT: 400 mL / NET: 200 mL    04-20 @ 07:01  -  04-21 @ 06:53  --------------------------------------------------------  IN: 850 mL / OUT: 0 mL / NET: 850 mL      CAPILLARY BLOOD GLUCOSE      POCT Blood Glucose.: 100 mg/dL (21 Apr 2025 06:26)      ECG: reviewed.    PHYSICAL EXAM:    GENERAL: NAD, lying in bed comfortably  HEAD:  Atraumatic, normocephalic  EYES: EOMI, PERRLA, conjunctiva and sclera clear  NECK: Supple, trachea midline, no JVD  HEART: Regular rate and rhythm, no murmurs, rubs, or gallops  LUNGS: Unlabored respirations.  Clear to auscultation bilaterally, no crackles, wheezing, or rhonchi  ABDOMEN: Soft, nontender, nondistended, +BS  EXTREMITIES: 2+ peripheral pulses bilaterally, cap refill<2 secs. No clubbing, cyanosis, or edema  NERVOUS SYSTEM:  A&Ox3, following commands, moving all extremities, no focal deficits   SKIN: No rashes or lesions    MEDICATIONS:  MEDICATIONS  (STANDING):  cefepime   IVPB 2000 milliGRAM(s) IV Intermittent every 8 hours  cefepime   IVPB      chlorhexidine 2% Cloths 1 Application(s) Topical <User Schedule>  fluticasone propionate 50 MICROgram(s)/spray Nasal Spray 1 Spray(s) Both Nostrils two times a day  ipratropium    for Nebulization 500 MICROGram(s) Nebulizer every 6 hours  montelukast 10 milliGRAM(s) Oral daily  pantoprazole    Tablet 40 milliGRAM(s) Oral before breakfast  remodulin (treprostinil) SubQ infusion 1 Dose(s) 1 Dose(s) SubCutaneous Continuous Pump  rivaroxaban 15 milliGRAM(s) Oral with dinner    MEDICATIONS  (PRN):  acetaminophen     Tablet .. 1000 milliGRAM(s) Oral every 6 hours PRN Mild Pain (1 - 3)  ketorolac   Injectable 30 milliGRAM(s) IV Push every 8 hours PRN Moderate Pain (4 - 6)  meclizine 12.5 milliGRAM(s) Oral once PRN Dizziness  melatonin 9 milliGRAM(s) Oral at bedtime PRN Insomnia  metoclopramide 10 milliGRAM(s) Oral every 8 hours PRN Migraine      ALLERGIES:  Allergies    vancomycin (Rash)  penicillin (Rash)  adhesives (Rash)    Intolerances        LABS:                        9.3    5.79  )-----------( 193      ( 19 Apr 2025 22:40 )             29.9     04-19    137  |  110[H]  |  6[L]  ----------------------------<  89  3.9   |  17[L]  |  0.91    Ca    7.6[L]      19 Apr 2025 22:40  Phos  2.4     04-19  Mg     1.9     04-19    TPro  5.9[L]  /  Alb  3.4  /  TBili  0.3  /  DBili  x   /  AST  15  /  ALT  11  /  AlkPhos  51  04-19    PT/INR - ( 19 Apr 2025 22:40 )   PT: 22.3 sec;   INR: 1.95 ratio         PTT - ( 19 Apr 2025 22:40 )  PTT:50.9 sec  Urinalysis Basic - ( 19 Apr 2025 22:40 )    Color: x / Appearance: x / SG: x / pH: x  Gluc: 89 mg/dL / Ketone: x  / Bili: x / Urobili: x   Blood: x / Protein: x / Nitrite: x   Leuk Esterase: x / RBC: x / WBC x   Sq Epi: x / Non Sq Epi: x / Bacteria: x      ABG:      vBG:    Micro:    Culture - Blood (collected 04-11-25 @ 16:30)  Source: Blood Blood  Final Report (04-16-25 @ 22:00):    No growth at 5 days    Culture - Blood (collected 04-11-25 @ 16:25)  Source: Blood Blood  Final Report (04-16-25 @ 22:00):    No growth at 5 days    Culture - Blood (collected 04-08-25 @ 13:00)  Source: Blood Blood  Final Report (04-13-25 @ 18:00):    No growth at 5 days    Culture - Blood (collected 04-08-25 @ 12:55)  Source: Blood Blood  Final Report (04-13-25 @ 18:00):    No growth at 5 days    Culture - Blood (collected 04-02-25 @ 12:30)  Source: Blood Blood-Peripheral  Final Report (04-07-25 @ 18:01):    No growth at 5 days    Culture - Blood (collected 04-02-25 @ 12:15)  Source: Blood Blood-Peripheral  Final Report (04-07-25 @ 18:01):    No growth at 5 days       Urinalysis with Rflx Culture (collected 04-02-25 @ 15:13)         RADIOLOGY & ADDITIONAL TESTS: Reviewed.

## 2025-04-22 NOTE — PROGRESS NOTE ADULT - ASSESSMENT
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 was admitted to [facility] 10/13/24-10/29/24 for pre-transplant expedited workup. Sent to ED 4/2/25 from MD for abdominal abscess    CXR (4/2) Perihilar opacities, likely represents enlarged pulmonary arteries. Otherwise clear lungs.    CT A/P (4/2) Peripherally enhancing lesion in the right mid abdominal wall subjacent to diffuse skin thickening measuring 5.0 x 2.1 x 4.0 cm consistent with phlegmon/abscess. Underlying mass is not excluded. Superficial skin thickening in the left abdominal wall at the level of   the umbilicus. Underlying small abscess measures 8 mm.    s/p bedside abdominal wall abscess I&D on 4/3    #MRSA Abdominal Wall Abscess, Hypotension  --General surgery without plans for incision and drainage of the left-sided abdominal wall fluid collection  --wounds improving with pink granulation tissue  -- abdominal wall wound grew pseudomonas   --she completed 7days of IV Cefepime    #Pre-Lung Transplant Evaluation  COVID19 Nucleocapsid Antibody Negative  COVID19 Rajinder Antibody Positive  HAV IgG Negative  HBVs Ab Negative  HBVsAg Negative  HBVc Ab Negative  HCV Ab Negative  HSV 1 IgG Positive  HSV 2 IgG Negative  EBV IgG Positive  CMV IgG Positive  VZV IgG Positive  Measles IgG Positive  Mumps IgG Positive  Rubella IgG Positive  Quantiferon Gold Negative  HIV Ag/Ab by CMIA Negative  Syphilis Screen Negative  Toxoplasma IgG Negative  Strongyloides Ab Negative  Trypanosoma cruzi Ab Negative  --ID clearance for lung transplant -no absolute contra indication to listing at this point as abdominal wounds almost healed    #Encounter to Vaccinate Patient  COVID19: Would benefit from COVID19 [id number] Vaccine Dose  Influenza: needs this next season ,   RSV: Arexvy 3/6/25  Pneumococcal: States she had pneumococcal vaccination ~2022  HAV: Would benefit from Havrix  HBV: Heplisav Dose 1 3/6/25. Dose 2 of Heplisav ordered  MMR: Rubella IgG pending. Mumps and Measles immune  Varicella: Immune will not require further vaccination  Shingles: Will recommend Shingrix  Tdap: Will recommend Tdap    I will c    Sreekanth Jauregui MD  Can be called via Teams  After 5pm/weekends [phone]

## 2025-04-22 NOTE — PROGRESS NOTE ADULT - SUBJECTIVE AND OBJECTIVE BOX
Neurology Progress Note    S: Patient seen and examined.   in ICU. no HA ; sitting in chair          Medications: MEDICATIONS  (STANDING):  chlorhexidine 2% Cloths 1 Application(s) Topical <User Schedule>  fluticasone propionate 50 MICROgram(s)/spray Nasal Spray 1 Spray(s) Both Nostrils two times a day  ipratropium    for Nebulization 500 MICROGram(s) Nebulizer every 6 hours  montelukast 10 milliGRAM(s) Oral daily  pantoprazole    Tablet 40 milliGRAM(s) Oral before breakfast  remodulin (treprostinil) SubQ infusion 1 Dose(s) 1 Dose(s) SubCutaneous Continuous Pump  rivaroxaban 15 milliGRAM(s) Oral with dinner    MEDICATIONS  (PRN):  acetaminophen     Tablet .. 1000 milliGRAM(s) Oral every 6 hours PRN Mild Pain (1 - 3)  cetirizine 10 milliGRAM(s) Oral daily PRN Amoxicillin Challenge  EPINEPHrine     1 mG/mL Injectable 0.3 milliGRAM(s) IntraMuscular once PRN Amoxicllin Challenge  ketorolac   Injectable 30 milliGRAM(s) IV Push every 8 hours PRN Moderate Pain (4 - 6)  meclizine 12.5 milliGRAM(s) Oral once PRN Dizziness  melatonin 9 milliGRAM(s) Oral at bedtime PRN Insomnia  metoclopramide 10 milliGRAM(s) Oral every 8 hours PRN Migraine       Vitals:  Vital Signs Last 24 Hrs  T(C): 37.7 (22 Apr 2025 08:00), Max: 37.7 (22 Apr 2025 08:00)  T(F): 99.8 (22 Apr 2025 08:00), Max: 99.8 (22 Apr 2025 08:00)  HR: 76 (22 Apr 2025 08:00) (76 - 102)  BP: 99/61 (22 Apr 2025 08:00) (99/61 - 111/67)  BP(mean): 74 (22 Apr 2025 08:00) (74 - 83)  RR: 15 (22 Apr 2025 08:00) (13 - 30)  SpO2: 99% (22 Apr 2025 08:00) (95% - 100%)    Parameters below as of 22 Apr 2025 08:00  Patient On (Oxygen Delivery Method): room air              General Exam:   General Appearance: Appropriately dressed and in no acute distress       Head: Normocephalic, atraumatic and no dysmorphic features  Ear, Nose, and Throat: Moist mucous membranes  CVS: S1S2+  Resp: No SOB, no wheeze or rhonchi  Abd: soft NTND  Extremities: No edema, no cyanosis  Skin: No bruises, no rashes     Neurological Exam:       level of consciousness: Awake, alert  Orientation: Oriented to person, age, place, and time  Language: Speech is fluent with intact naming, repetition, and ability to follow commands (including simple commands and complex cross commands)  Cortical Signs:  no hemineglect.   Content: Good overall fund of knowledge (aware of current events,and relevant past history)    HINTS was +; improved      Hearing intact and  symmetric to finger rub b/l as above    - Cranial Nerves:   PERRL, VFF, EOMI, facial sensation is intact in the V1-V3 distribution bilaterally; face is symmetric with augusto smile; hearing is intact to finger rub bilaterally and equally; uvula is midline and soft palate rises symmetrically; shoulder shrug intact; tongue protrudes in the midline with intact lateral movements    - Motor Testing:  Bulk: Normal   Tone: Normal  Strength:  There is no pronator drift b/l  There is no leg drift b/l  NO orbiting  intact rapid finger rapping                              Right           Left  Should-Abduct      5                   5   -some pain limitations  Elbow-Flex             5                   5  Elbow-Ext              5                   5                        5                   5    Hip-Flex                 5                   5  Knee-Flex              5                   5  Knee-Ext                5                   5  Dorsiflex                5                   5  Plantarflex             5                   5    - Reflexes:              Right           Left  Triceps                     2+                2+  Biceps                      2+                 2+  Brachioradialis         2+                2+  Patellar                    2+                 2+    requires distraction to elicit all reflexes    - Sensory: Intact throughout to light touch.   - Coordination: Finger-nose-finger intact without dysmetria.   ROmberg negative   - Gait: Normal steps, base, arm swing, and turning.  Able to tandem walk. Able to heel and toe walk.    I personally reviewed the below data/images/labs:         LABS:        no new labs today           < from: CT Head No Cont (04.07.25 @ 10:22) >    ACC: [id number] EXAM:  CT BRAIN   ORDERED BY:  SERINA BOWEN     PROCEDURE DATE:  04/07/2025          INTERPRETATION:  CLINICAL INFORMATION: headaches    COMPARISON: CT head 10/23/2024    CONTRAST:  IV Contrast: NONE  .    TECHNIQUE:  Serial axial images were obtained from the skull base to the   vertex using multi-slice helical technique. Sagittal and coronal   reformats were obtained.    FINDINGS:    VENTRICLES AND SULCI: Normal in size and configuration.  INTRA-AXIAL: No mass effect, acute hemorrhage, or midline shift.  EXTRA-AXIAL: No mass or fluid collection. Basal cisterns are normal in   appearance.    VISUALIZED SINUSES:  Clear.  TYMPANOMASTOID CAVITIES:  Clear.  VISUALIZED ORBITS: Normal.  CALVARIUM: Intact.    MISCELLANEOUS: None.      IMPRESSION:  No acute intracranial hemorrhage, mass effect, or midline shift.        --- End of Report ---            AAKASH SIMON MD; Attending Radiologist  This document has been electronically signed. Apr 7 2025 11:05AM    < end of copied text >

## 2025-04-22 NOTE — PROGRESS NOTE ADULT - ATTENDING COMMENTS
44F w/ Group 1 PAH on treprostinil infusion, s/p PPM, chronic PE, SVT s/p ablation, abdominal wall abscess w/ MRSA. Presents w/ worsening abdominal abscess s/p I&D w/ cx growing MRSA. COntinues to be admitted for ongoing lung transplant evaluation. Transferred to MICU for close monitoring while getting imaging while off treprostinil and now for PCN desensitization.     #Neuro - awake and alert; pain regimen, please avoid benadryl  #CV - HD stable   #Pulm - continue treprostinil infusion for PAH at current dosing; SpO2 acceptable on 2LNC; continue atrovent, singulair and flonase; lung transplant evaluation ongoing  #ID- s/p cefepime for pseudomonas growing in wound culture, s/p daptomycin for MRSA in I&D culture; planning for PCN desensitization per allergy recs today  #Renal/metabolic - normal renal function; monitor I/Os and electrolytes  #GI- PO diet as tolerated, PPI  #Heme - slowly decreasing hgb, no active bleeding, monitor for now; continue apixiban for hx of DVT  #Endo - monitor BG  #Skin - s/p removal of skin tag/mass on L thigh by plastics - dressing changes and surgical site care per plastics   #PPx - apixiban  #Dispo- remains in ICU for PCN desensitization, can transfer after complete; prognosis guarded; full code; OOB to chair, ambulation

## 2025-04-22 NOTE — PROGRESS NOTE ADULT - ASSESSMENT
MEDICATIONS  (STANDING):  buDESOnide    Inhalation Suspension 0.5 milliGRAM(s) Inhalation daily  chlorhexidine 2% Cloths 1 Application(s) Topical <User Schedule>  DAPTOmycin IVPB      DAPTOmycin IVPB 600 milliGRAM(s) IV Intermittent every 24 hours  fluticasone propionate 50 MICROgram(s)/spray Nasal Spray 1 Spray(s) Both Nostrils two times a day  heparin   Injectable 5000 Unit(s) SubCutaneous every 8 hours  influenza   Vaccine 0.5 milliLiter(s) IntraMuscular once  midodrine. 20 milliGRAM(s) Oral three times a day  montelukast 10 milliGRAM(s) Oral daily  pantoprazole    Tablet 40 milliGRAM(s) Oral before breakfast  polyethylene glycol  3350 17 Gram(s) Oral two times a day  remodulin (treprostinil) SubQ infusion 1 Dose(s) 1 Dose(s) SubCutaneous Continuous Pump  senna 2 Tablet(s) Oral at bedtime    MEDICATIONS  (PRN):  acetaminophen     Tablet .. 1000 milliGRAM(s) Oral every 6 hours PRN Mild Pain (1 - 3), Moderate Pain (4 - 6)  albuterol/ipratropium for Nebulization 3 milliLiter(s) Nebulizer every 6 hours PRN Shortness of Breath  diphenhydrAMINE Injectable 25 milliGRAM(s) IV Push every 8 hours PRN Migraine  ketorolac   Injectable 30 milliGRAM(s) IV Push every 8 hours PRN Migraine  meclizine 12.5 milliGRAM(s) Oral once PRN Dizziness  melatonin 9 milliGRAM(s) Oral at bedtime PRN Insomnia  metoclopramide 10 milliGRAM(s) Oral every 8 hours PRN Migraine  oxyCODONE    IR 2.5 milliGRAM(s) Oral every 6 hours PRN Severe Pain (7 - 10)      CAPILLARY BLOOD GLUCOSE        I&O's Summary    13 Apr 2025 07:01  -  14 Apr 2025 07:00  --------------------------------------------------------  IN: 650 mL / OUT: 400 mL / NET: 250 mL        PHYSICAL EXAM:  Vital Signs Last 24 Hrs  T(C): 36.7 (14 Apr 2025 04:00), Max: 36.9 (13 Apr 2025 16:00)  T(F): 98 (14 Apr 2025 04:00), Max: 98.4 (13 Apr 2025 16:00)  HR: 83 (14 Apr 2025 07:00) (66 - 91)  BP: 107/62 (14 Apr 2025 07:00) (81/46 - 120/57)  BP(mean): 81 (14 Apr 2025 07:00) (65 - 82)  RR: 17 (14 Apr 2025 07:00) (11 - 45)  SpO2: 100% (14 Apr 2025 07:00) (92% - 100%)    Parameters below as of 13 Apr 2025 20:00  Patient On (Oxygen Delivery Method): room air        PHYSICAL EXAM:  GENERAL: NAD, lying in bed comfortably  HEENT: NC/AT  CHEST/LUNG: CTAB, no increased WOB  HEART: RRR, no m/r/g  ABDOMEN: soft, NT, ND, BS+  NERVOUS SYSTEM:  A&Ox3    LABS:                        11.0   9.61  )-----------( 337      ( 13 Apr 2025 22:36 )             34.3     04-13    137  |  103  |  11  ----------------------------<  84  4.3   |  22  |  1.06    Ca    8.5      13 Apr 2025 22:36  Phos  3.0     04-13  Mg     2.1     04-13    TPro  6.8  /  Alb  3.8  /  TBili  0.8  /  DBili  x   /  AST  11  /  ALT  7[L]  /  AlkPhos  62  04-13    PT/INR - ( 13 Apr 2025 22:36 )   PT: 13.7 sec;   INR: 1.19 ratio         PTT - ( 13 Apr 2025 22:36 )  PTT:39.6 sec      Urinalysis Basic - ( 13 Apr 2025 22:36 )    Color: x / Appearance: x / SG: x / pH: x  Gluc: 84 mg/dL / Ketone: x  / Bili: x / Urobili: x   Blood: x / Protein: x / Nitrite: x   Leuk Esterase: x / RBC: x / WBC x   Sq Epi: x / Non Sq Epi: x / Bacteria: x        Culture - Blood (collected 11 Apr 2025 16:30)  Source: Blood Blood  Preliminary Report (13 Apr 2025 22:01):    No growth at 48 Hours    Culture - Blood (collected 11 Apr 2025 16:25)  Source: Blood Blood  Preliminary Report (13 Apr 2025 22:01):    No growth at 48 Hours    Assessment and Plan:    Mrs. 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 Initially started on Linezolid switched to Daptomycin. S/p bedside aspiration of LLQ collection 4/13 Cx growing Pseudomonas, switched to Cefepime. Currently  ongoing lung transplant and penicillin desensitization testing to be conducted 4/22    ===Neuro===  -AAOx3  #migraines  - ketorolac (Toradol) 30mg IV q8h PRN (or acetaminophen 1g IV q8h PRN), metoclopramide (Reglan) 10mg q8h PRN  -hold all benadryl until after allergy testing on 4/22    #peripheral vertigo  -meclizine 12.5 mg PRN     ===Cardiovascular===    # Hx of Right Heart Failure  -Hold bumetanide, spironolactone, and nifedipine.  -Heart failure signing off as repeat TTE similar to prior   - TTE: 60-65% paradoxical septal motion consistent with right ventricular volume overload, Moderate SC      #Pulmonary Hypertension:  -Continue home Remodulin subQ 51 ng/kg/min.  - MYRIAM on 4/16 without evidence of pulmonic damage   - Transplant Pulmonology following    #Chronic PE  - On Xarelto    ===Pulmonary===  #Pulmonary Hypertension:  -Continue home Remodulin subQ 51 ng/kg/min.  - Midline 4/11  - Transplant Pulmonology following    #Asthma  Continue home ipratropium (Atrovent) nebulizer PRN.  Continue home budesonide/formoterol (Pulmicort), tiotropium (Spiriva), and PRN nebulizers.  Continue fluticasone (Flonase).  Maintain supplemental O2 2L NC, keep SpO2 > 92%, avoid hyperoxia.      #Lung Transplant Evaluation  Ongoing pre-lung transplant workup.  Transplant Pulm requesting LLQ abscess I&D for medical optimization for lung transplant candidacy   Seen by pulmonary transplant service.  Patient is being considered for inpatient transplant workup per Dr. Wise.  -s/p cardiac MRI  -s/p CT colo    ===/Renal===  - no active issues    ===GI===  - no active issues    ===Hematology/Onc===  #Iron Deficiency Anemia (JULIA)  - CT colongraphy on 4/18 for further workup for iron deficiency anemia   - no evidence of polyps on CT colonography    ===ID===  #Abdominal Abscess  - Previously on linezolid 600 mg PO q12h 4/16, switched to daptomycin 6 mg/kg until 4/19  - s/p LLQ collection aspiration on 4/13 Cx growing pseudomonas, Cefepime until 4/21    #penicillin allergy   - Pending Penicillin allergy test on 4/22  -per allergy note on 4/10 from inpatient team was recommending a graded challenge, appears to not have occurred as of yet  - administer 10% of standard amoxicillin dose, then observe for 30 minutes  - if tolerated, administer remaining 90% of standard amoxicillin dose, then observe for 60 minutes  - if patient does not develop immediate reactions then is considered to be not allergic and penicillin allergy can be delabeled  -topical corticosteroids are okay for itching in the interterm s      SKIN:  #pedunculated mass on L anterior thigh  - evaluated by derm on 10/2024, no concern for malignancy, was told to follow up outpatient for removal   - ketorolac (Toradol) 30mg IV q8h PRN (or acetaminophen 1g IV q8h PRN), for pain relief       MEDICATIONS  (STANDING):  buDESOnide    Inhalation Suspension 0.5 milliGRAM(s) Inhalation daily  chlorhexidine 2% Cloths 1 Application(s) Topical <User Schedule>  DAPTOmycin IVPB      DAPTOmycin IVPB 600 milliGRAM(s) IV Intermittent every 24 hours  fluticasone propionate 50 MICROgram(s)/spray Nasal Spray 1 Spray(s) Both Nostrils two times a day  heparin   Injectable 5000 Unit(s) SubCutaneous every 8 hours  influenza   Vaccine 0.5 milliLiter(s) IntraMuscular once  midodrine. 20 milliGRAM(s) Oral three times a day  montelukast 10 milliGRAM(s) Oral daily  pantoprazole    Tablet 40 milliGRAM(s) Oral before breakfast  polyethylene glycol  3350 17 Gram(s) Oral two times a day  remodulin (treprostinil) SubQ infusion 1 Dose(s) 1 Dose(s) SubCutaneous Continuous Pump  senna 2 Tablet(s) Oral at bedtime    MEDICATIONS  (PRN):  acetaminophen     Tablet .. 1000 milliGRAM(s) Oral every 6 hours PRN Mild Pain (1 - 3), Moderate Pain (4 - 6)  albuterol/ipratropium for Nebulization 3 milliLiter(s) Nebulizer every 6 hours PRN Shortness of Breath  diphenhydrAMINE Injectable 25 milliGRAM(s) IV Push every 8 hours PRN Migraine  ketorolac   Injectable 30 milliGRAM(s) IV Push every 8 hours PRN Migraine  meclizine 12.5 milliGRAM(s) Oral once PRN Dizziness  melatonin 9 milliGRAM(s) Oral at bedtime PRN Insomnia  metoclopramide 10 milliGRAM(s) Oral every 8 hours PRN Migraine  oxyCODONE    IR 2.5 milliGRAM(s) Oral every 6 hours PRN Severe Pain (7 - 10)      CAPILLARY BLOOD GLUCOSE        I&O's Summary    13 Apr 2025 07:01  -  14 Apr 2025 07:00  --------------------------------------------------------  IN: 650 mL / OUT: 400 mL / NET: 250 mL        PHYSICAL EXAM:  Vital Signs Last 24 Hrs  T(C): 36.7 (14 Apr 2025 04:00), Max: 36.9 (13 Apr 2025 16:00)  T(F): 98 (14 Apr 2025 04:00), Max: 98.4 (13 Apr 2025 16:00)  HR: 83 (14 Apr 2025 07:00) (66 - 91)  BP: 107/62 (14 Apr 2025 07:00) (81/46 - 120/57)  BP(mean): 81 (14 Apr 2025 07:00) (65 - 82)  RR: 17 (14 Apr 2025 07:00) (11 - 45)  SpO2: 100% (14 Apr 2025 07:00) (92% - 100%)    Parameters below as of 13 Apr 2025 20:00  Patient On (Oxygen Delivery Method): room air        PHYSICAL EXAM:  GENERAL: NAD, lying in bed comfortably  HEENT: NC/AT  CHEST/LUNG: CTAB, no increased WOB  HEART: RRR, no m/r/g  ABDOMEN: soft, NT, ND, BS+  NERVOUS SYSTEM:  A&Ox3    LABS:                        11.0   9.61  )-----------( 337      ( 13 Apr 2025 22:36 )             34.3     04-13    137  |  103  |  11  ----------------------------<  84  4.3   |  22  |  1.06    Ca    8.5      13 Apr 2025 22:36  Phos  3.0     04-13  Mg     2.1     04-13    TPro  6.8  /  Alb  3.8  /  TBili  0.8  /  DBili  x   /  AST  11  /  ALT  7[L]  /  AlkPhos  62  04-13    PT/INR - ( 13 Apr 2025 22:36 )   PT: 13.7 sec;   INR: 1.19 ratio         PTT - ( 13 Apr 2025 22:36 )  PTT:39.6 sec      Urinalysis Basic - ( 13 Apr 2025 22:36 )    Color: x / Appearance: x / SG: x / pH: x  Gluc: 84 mg/dL / Ketone: x  / Bili: x / Urobili: x   Blood: x / Protein: x / Nitrite: x   Leuk Esterase: x / RBC: x / WBC x   Sq Epi: x / Non Sq Epi: x / Bacteria: x        Culture - Blood (collected 11 Apr 2025 16:30)  Source: Blood Blood  Preliminary Report (13 Apr 2025 22:01):    No growth at 48 Hours    Culture - Blood (collected 11 Apr 2025 16:25)  Source: Blood Blood  Preliminary Report (13 Apr 2025 22:01):    No growth at 48 Hours    Assessment and Plan:    Mrs. 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 Initially started on Linezolid switched to Daptomycin. S/p bedside aspiration of LLQ collection 4/13 Cx growing Pseudomonas, switched to Cefepime. Currently  ongoing lung transplant and penicillin desensitization testing to be conducted 4/22    ===Neuro===  -AAOx3  #migraines  - ketorolac (Toradol) 30mg IV q8h PRN (or acetaminophen 1g IV q8h PRN), metoclopramide (Reglan) 10mg q8h PRN  - hold all benadryl     #peripheral vertigo  -meclizine 12.5 mg PRN     ===Cardiovascular===    # Hx of Right Heart Failure  -Hold bumetanide, spironolactone, and nifedipine.  -Heart failure signing off as repeat TTE similar to prior   - TTE: 60-65% paradoxical septal motion consistent with right ventricular volume overload, Moderate DE      #Pulmonary Hypertension:  -Continue home Remodulin subQ 51 ng/kg/min.  - MYRIAM on 4/16 without evidence of pulmonic damage   - Transplant Pulmonology following    #Chronic PE  - On Xarelto    ===Pulmonary===  #Pulmonary Hypertension:  -Continue home Remodulin subQ 51 ng/kg/min.  - Midline 4/11  - Transplant Pulmonology following    #Asthma  Continue home ipratropium (Atrovent) nebulizer PRN.  Continue home budesonide/formoterol (Pulmicort), tiotropium (Spiriva), and PRN nebulizers.  Continue fluticasone (Flonase).  Maintain supplemental O2 2L NC, keep SpO2 > 92%, avoid hyperoxia.      #Lung Transplant Evaluation  Ongoing pre-lung transplant workup.  Transplant Pulm requesting LLQ abscess I&D for medical optimization for lung transplant candidacy   Seen by pulmonary transplant service.  Patient is being considered for inpatient transplant workup per Dr. Wise.  -s/p cardiac MRI  -s/p CT colo    ===/Renal===  - no active issues    ===GI===  - no active issues    ===Hematology/Onc===  #Iron Deficiency Anemia (JULIA)  - CT colongraphy on 4/18 for further workup for iron deficiency anemia   - no evidence of polyps on CT colonography    ===ID===  #Abdominal Abscess  - Previously on linezolid 600 mg PO q12h 4/16, switched to daptomycin 6 mg/kg until 4/19  - s/p LLQ collection aspiration on 4/13 Cx growing pseudomonas, Cefepime until 4/21    #penicillin allergy   - Amoxicillin challenge today  -per allergy note on 4/10 from inpatient team was recommending a graded challenge, appears to not have occurred as of yet  - administer 10% of standard amoxicillin dose, then observe for 30 minutes  - if tolerated, administer remaining 90% of standard amoxicillin dose, then observe for 60 minutes  - if patient does not develop immediate reactions then is considered to be not allergic and penicillin allergy can be delabeled  -topical corticosteroids are okay for itching in the interterm s      SKIN:  #pedunculated mass on L anterior thigh  - evaluated by derm on 10/2024, no concern for malignancy, was told to follow up outpatient for removal   - ketorolac (Toradol) 30mg IV q8h PRN (or acetaminophen 1g IV q8h PRN), for pain relief

## 2025-04-22 NOTE — CHART NOTE - NSCHARTNOTEFT_GEN_A_CORE
MICU Downgrade Note  ---------------------------    Transfer from: MICU  Transfer to:  (X) Medicine    (  ) Telemetry    (  ) RCU    (  ) Palliative    (  ) Stroke Unit    (  ) _______________  Accepting Physician:      MICU COURSE    Patient is a 44y old  Female who presents with a chief complaint of pre transplant evaluation (2025 22:30)      HPI:  44y F , with PMH of pHTN (on treprostinil infusion still continuing ), Right   Heart failure(s/p PPM dual chamber ), chronic PE (dx ) on DOAC, SVT (s/p ablation 2020), asthma, h/o MRSA abdominal wall abscesses last , and JULIA. was admitted after being sent in from outpatient pulmonologist. for concern of worsening abdominal abscess in proximity to medication pump that has been unresponsive to doxycyline. Per patient she started having abdominal pain and increased swelling two weeks ago. Went into her outpatient transplant ID doctor Dr Ash and was given a course of doxycyline yesterday she presented to her pulmonologist DR Yoon who was concerned about the size of the abscess and that it was not improving. She was sent in for further evaluation Patient denied cough, fevers, myalgias vomiting ot diarrhea during the time period of the abscess formations     ED course   Tmax 97.7, , /56 on 2L home   Not meeting SIRS criteria   Linezolid and Cefepime, Ofirmev, Morphine   (2025 16:47)      MICU Course:        REVIEW OF SYSTEMS:  CONSTITUTIONAL: No fever, chills  HEENT:  No blurry vision No sinus or throat pain  NECK: No pain or stiffness  RESPIRATORY: No cough, wheezing, chills or hemoptysis; No shortness of breath  CARDIOVASCULAR: No chest pain, palpitations  GASTROINTESTINAL: No abdominal pain. No nausea, vomiting, or diarrhea  GENITOURINARY: No dysuria  NEUROLOGICAL: No HA, No focal weakness  SKIN: No itching, burning, rashes, or lesions   MUSCULOSKELETAL: No joint pain or swelling; No muscle, back, or extremity pain    MEDICATIONS:  acetaminophen     Tablet .. 1000 milliGRAM(s) Oral every 6 hours PRN  cetirizine 10 milliGRAM(s) Oral daily PRN  chlorhexidine 2% Cloths 1 Application(s) Topical <User Schedule>  EPINEPHrine     1 mG/mL Injectable 0.3 milliGRAM(s) IntraMuscular once PRN  fluticasone propionate 50 MICROgram(s)/spray Nasal Spray 1 Spray(s) Both Nostrils two times a day  ipratropium    for Nebulization 500 MICROGram(s) Nebulizer every 6 hours  ketorolac   Injectable 30 milliGRAM(s) IV Push every 8 hours PRN  meclizine 12.5 milliGRAM(s) Oral once PRN  melatonin 9 milliGRAM(s) Oral at bedtime PRN  metoclopramide 10 milliGRAM(s) Oral every 8 hours PRN  montelukast 10 milliGRAM(s) Oral daily  pantoprazole    Tablet 40 milliGRAM(s) Oral before breakfast  remodulin (treprostinil) SubQ infusion 1 Dose(s) 1 Dose(s) SubCutaneous Continuous Pump  rivaroxaban 15 milliGRAM(s) Oral with dinner      T(C): 36.6 (25 @ 13:00), Max: 37.7 (25 @ 08:00)  HR: 90 (25 @ 13:30) (76 - 100)  BP: 113/69 (25 @ 13:30) (93/61 - 114/75)  RR: 22 (25 @ 13:30) (13 - 29)  SpO2: 100% (25 @ 13:30) (95% - 100%)  Wt(kg): --Vital Signs Last 24 Hrs  T(C): 36.6 (2025 13:00), Max: 37.7 (2025 08:00)  T(F): 97.9 (2025 13:00), Max: 99.8 (2025 08:00)  HR: 90 (2025 13:30) (76 - 100)  BP: 113/69 (2025 13:30) (93/61 - 114/75)  BP(mean): 86 (2025 13:30) (72 - 88)  RR: 22 (:30) (13 - 29)  SpO2: 100% (2025 13:30) (95% - 100%)    Parameters below as of 2025 11:23  Patient On (Oxygen Delivery Method): room air        PHYSICAL EXAM:  GENERAL: NAD, well-groomed, well-developed  HEAD:  Atraumatic, Normocephalic  EYES: EOMI, PERRLA, conjunctiva and sclera clear  ENMT:  Moist mucous membranes  NECK: Supple, No JVD,  CHEST/LUNG: Clear to auscultation bilaterally; No rales, rhonchi, wheezing, or rubs  HEART: Regular rate and rhythm; No murmurs, rubs, or gallops  ABDOMEN: Soft, Nontender, Nondistended; Bowel sounds present  NEURO: Alert & Oriented X3  EXTREMITIES: No LE edema, no calf tenderness  LYMPH: No lymphadenopathy noted  SKIN: No rashes or lesions    Consultant(s) Notes Reviewed:  [x ] YES  [ ] NO  Care Discussed with Consultants/Other Providers [ x] YES  [ ] NO    LABS:              CAPILLARY BLOOD GLUCOSE                RADIOLOGY & ADDITIONAL TESTS:    Imaging Personally Reviewed:  [x ] YES  [ ] NO    For Follow-Up:  [ ]   [ ] MICU Downgrade Note  ---------------------------    Transfer from: MICU  Transfer to:  (X) Medicine    (  ) Telemetry    (  ) RCU    (  ) Palliative    (  ) Stroke Unit    (  ) _______________  Accepting Physician:      MICU COURSE    Patient is a 44y old  Female who presents with a chief complaint of pre transplant evaluation (2025 22:30)      HPI:  44y F , with PMH of pHTN (on treprostinil infusion still continuing ), Right   Heart failure(s/p PPM dual chamber ), chronic PE (dx ) on DOAC, SVT (s/p ablation 2020), asthma, h/o MRSA abdominal wall abscesses last , and JULIA. was admitted after being sent in from outpatient pulmonologist. for concern of worsening abdominal abscess in proximity to medication pump that has been unresponsive to doxycyline. Per patient she started having abdominal pain and increased swelling two weeks ago. Went into her outpatient transplant ID doctor Dr Ash and was given a course of doxycyline yesterday she presented to her pulmonologist DR Yoon who was concerned about the size of the abscess and that it was not improving. She was sent in for further evaluation Patient denied cough, fevers, myalgias vomiting ot diarrhea during the time period of the abscess formations     ED course   Tmax 97.7, , /56 on 2L home   Not meeting SIRS criteria   Linezolid and Cefepime, Ofirmev, Morphine   (2025 16:47)      MICU Course:  Upgraded to MICU  for management of consideration for lung transplant. Surgery consulted 4/10 for abscess drainage conducted on . Patient initially managed on Linezolid, Daptomycin. abscess cultures positive for pseudomonas, and patient switched to Cefepime until . cMRI for transplant eval conducted , Abdominal MRI conducted 4/15. CT colonography conducted :  No colonic polyp or mass. Patient documented to have penicillin allergy, Allergy consulted and recommended amoxicillin challenge which was conducted on  which was revealed as negative. Stable for floor transfer.           REVIEW OF SYSTEMS:  CONSTITUTIONAL: No fever, chills  HEENT:  No blurry vision No sinus or throat pain  NECK: No pain or stiffness  RESPIRATORY: No cough, wheezing, chills or hemoptysis; No shortness of breath  CARDIOVASCULAR: No chest pain, palpitations  GASTROINTESTINAL: No abdominal pain. No nausea, vomiting, or diarrhea  GENITOURINARY: No dysuria  NEUROLOGICAL: No HA, No focal weakness  SKIN: No itching, burning, rashes, or lesions   MUSCULOSKELETAL: No joint pain or swelling; No muscle, back, or extremity pain    MEDICATIONS:  acetaminophen     Tablet .. 1000 milliGRAM(s) Oral every 6 hours PRN  cetirizine 10 milliGRAM(s) Oral daily PRN  chlorhexidine 2% Cloths 1 Application(s) Topical <User Schedule>  EPINEPHrine     1 mG/mL Injectable 0.3 milliGRAM(s) IntraMuscular once PRN  fluticasone propionate 50 MICROgram(s)/spray Nasal Spray 1 Spray(s) Both Nostrils two times a day  ipratropium    for Nebulization 500 MICROGram(s) Nebulizer every 6 hours  ketorolac   Injectable 30 milliGRAM(s) IV Push every 8 hours PRN  meclizine 12.5 milliGRAM(s) Oral once PRN  melatonin 9 milliGRAM(s) Oral at bedtime PRN  metoclopramide 10 milliGRAM(s) Oral every 8 hours PRN  montelukast 10 milliGRAM(s) Oral daily  pantoprazole    Tablet 40 milliGRAM(s) Oral before breakfast  remodulin (treprostinil) SubQ infusion 1 Dose(s) 1 Dose(s) SubCutaneous Continuous Pump  rivaroxaban 15 milliGRAM(s) Oral with dinner      T(C): 36.6 (25 @ 13:00), Max: 37.7 (25 @ 08:00)  HR: 90 (25 @ 13:30) (76 - 100)  BP: 113/69 (25 @ 13:30) (93/61 - 114/75)  RR: 22 (25 @ 13:30) (13 - 29)  SpO2: 100% (25 @ 13:30) (95% - 100%)  Wt(kg): --Vital Signs Last 24 Hrs  T(C): 36.6 (2025 13:00), Max: 37.7 (2025 08:00)  T(F): 97.9 (2025 13:00), Max: 99.8 (2025 08:00)  HR: 90 (2025 13:30) (76 - 100)  BP: 113/69 (2025 13:30) (93/61 - 114/75)  BP(mean): 86 (2025 13:30) (72 - 88)  RR: 22 (2025 13:30) (13 - 29)  SpO2: 100% (2025 13:30) (95% - 100%)    Parameters below as of 2025 11:23  Patient On (Oxygen Delivery Method): room air        PHYSICAL EXAM:  GENERAL: NAD, well-groomed, well-developed  HEAD:  Atraumatic, Normocephalic  EYES: EOMI, PERRLA, conjunctiva and sclera clear  ENMT:  Moist mucous membranes  NECK: Supple, No JVD,  CHEST/LUNG: Clear to auscultation bilaterally; No rales, rhonchi, wheezing, or rubs  HEART: Regular rate and rhythm; No murmurs, rubs, or gallops  ABDOMEN: Soft, Nontender, Nondistended; Bowel sounds present  NEURO: Alert & Oriented X3  EXTREMITIES: No LE edema, no calf tenderness  LYMPH: No lymphadenopathy noted  SKIN: No rashes or lesions    Consultant(s) Notes Reviewed:  [x ] YES  [ ] NO  Care Discussed with Consultants/Other Providers [ x] YES  [ ] NO    LABS:              CAPILLARY BLOOD GLUCOSE                RADIOLOGY & ADDITIONAL TESTS:    Imaging Personally Reviewed:  [x ] YES  [ ] NO    Assessment and Plan:    Mrs. 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 Initially started on Linezolid switched to Daptomycin. S/p bedside aspiration of LLQ collection  Cx growing Pseudomonas, switched to Cefepime. Currently  ongoing lung transplant and penicillin desensitization testing to be conducted     ===Neuro===  -AAOx3  #migraines  - ketorolac (Toradol) 30mg IV q8h PRN (or acetaminophen 1g IV q8h PRN), metoclopramide (Reglan) 10mg q8h PRN  - hold all benadryl     #peripheral vertigo  -meclizine 12.5 mg PRN     ===Cardiovascular===    # Hx of Right Heart Failure  -Hold bumetanide, spironolactone, and nifedipine.  -Heart failure signing off as repeat TTE similar to prior   - TTE: 60-65% paradoxical septal motion consistent with right ventricular volume overload, Moderate ID      #Pulmonary Hypertension:  -Continue home Remodulin subQ 51 ng/kg/min.  - MYRIAM on  without evidence of pulmonic damage   - Transplant Pulmonology following    #Chronic PE  - On Xarelto    ===Pulmonary===  #Pulmonary Hypertension:  -Continue home Remodulin subQ 51 ng/kg/min.  - Midline   - Transplant Pulmonology following    #Asthma  Continue home ipratropium (Atrovent) nebulizer PRN.  Continue home budesonide/formoterol (Pulmicort), tiotropium (Spiriva), and PRN nebulizers.  Continue fluticasone (Flonase).  Maintain supplemental O2 2L NC, keep SpO2 > 92%, avoid hyperoxia.      #Lung Transplant Evaluation  Ongoing pre-lung transplant workup.  Transplant Pulm requesting LLQ abscess I&D for medical optimization for lung transplant candidacy   Seen by pulmonary transplant service.  Patient is being considered for inpatient transplant workup per Dr. Wise.  -s/p cardiac MRI  -s/p CT colo    ===/Renal===  - no active issues    ===GI===  - no active issues    ===Hematology/Onc===  #Iron Deficiency Anemia (JULIA)  - CT colongraphy on  for further workup for iron deficiency anemia   - no evidence of polyps on CT colonography    ===ID===  #Abdominal Abscess  - Previously on linezolid 600 mg PO q12h , switched to daptomycin 6 mg/kg until   - s/p LLQ collection aspiration on  Cx growing pseudomonas, Cefepime until     #penicillin allergy   - Amoxicillin challenge today  -per allergy note on 4/10 from inpatient team was recommending a graded challenge, appears to not have occurred as of yet  - administer 10% of standard amoxicillin dose, then observe for 30 minutes  - if tolerated, administer remaining 90% of standard amoxicillin dose, then observe for 60 minutes  - if patient does not develop immediate reactions then is considered to be not allergic and penicillin allergy can be delabeled  -topical corticosteroids are okay for itching in the interterm s      SKIN:  #pedunculated mass on L anterior thigh  - evaluated by derm on 10/2024, no concern for malignancy, was told to follow up outpatient for removal   - ketorolac (Toradol) 30mg IV q8h PRN (or acetaminophen 1g IV q8h PRN), for pain relief        For Follow-Up:  [ ] F/u Transplant   [ ] Imaging for abscess resolution MICU Downgrade Note  ---------------------------    Transfer from: MICU  Transfer to:  (X) Medicine    (  ) Telemetry    (  ) RCU    (  ) Palliative    (  ) Stroke Unit    (  ) _______________  Accepting Physician: Dr. 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 was admitted after being sent in from outpatient pulmonologist. for concern of worsening abdominal abscess in proximity to medication pump that has been unresponsive to doxycyline. Per patient she started having abdominal pain and increased swelling two weeks ago. Went into her outpatient transplant ID doctor Dr Ash and was given a course of doxycyline yesterday she presented to her pulmonologist DR Yoon who was concerned about the size of the abscess and that it was not improving. She was sent in for further evaluation Patient denied cough, fevers, myalgias vomiting ot diarrhea during the time period of the abscess formations     ED course   Tmax 97.7, , /56 on 2L home   Not meeting SIRS criteria   Linezolid and Cefepime, Ofirmev, Morphine   (2025 16:47)      MICU Course:  Upgraded to MICU  for management of consideration for lung transplant. Surgery consulted 4/10 for abscess drainage conducted on . Patient initially managed on Linezolid, Daptomycin. abscess cultures positive for pseudomonas, and patient switched to Cefepime until . cMRI for transplant eval conducted , Abdominal MRI conducted 4/15. CT colonography conducted :  No colonic polyp or mass. Patient documented to have penicillin allergy, Allergy consulted and recommended amoxicillin challenge which was conducted on  which was revealed as negative. Stable for floor transfer.           REVIEW OF SYSTEMS:  CONSTITUTIONAL: No fever, chills  HEENT:  No blurry vision No sinus or throat pain  NECK: No pain or stiffness  RESPIRATORY: No cough, wheezing, chills or hemoptysis; No shortness of breath  CARDIOVASCULAR: No chest pain, palpitations  GASTROINTESTINAL: No abdominal pain. No nausea, vomiting, or diarrhea  GENITOURINARY: No dysuria  NEUROLOGICAL: No HA, No focal weakness  SKIN: No itching, burning, rashes, or lesions   MUSCULOSKELETAL: No joint pain or swelling; No muscle, back, or extremity pain    MEDICATIONS:  acetaminophen     Tablet .. 1000 milliGRAM(s) Oral every 6 hours PRN  cetirizine 10 milliGRAM(s) Oral daily PRN  chlorhexidine 2% Cloths 1 Application(s) Topical <User Schedule>  EPINEPHrine     1 mG/mL Injectable 0.3 milliGRAM(s) IntraMuscular once PRN  fluticasone propionate 50 MICROgram(s)/spray Nasal Spray 1 Spray(s) Both Nostrils two times a day  ipratropium    for Nebulization 500 MICROGram(s) Nebulizer every 6 hours  ketorolac   Injectable 30 milliGRAM(s) IV Push every 8 hours PRN  meclizine 12.5 milliGRAM(s) Oral once PRN  melatonin 9 milliGRAM(s) Oral at bedtime PRN  metoclopramide 10 milliGRAM(s) Oral every 8 hours PRN  montelukast 10 milliGRAM(s) Oral daily  pantoprazole    Tablet 40 milliGRAM(s) Oral before breakfast  remodulin (treprostinil) SubQ infusion 1 Dose(s) 1 Dose(s) SubCutaneous Continuous Pump  rivaroxaban 15 milliGRAM(s) Oral with dinner      T(C): 36.6 (25 @ 13:00), Max: 37.7 (25 @ 08:00)  HR: 90 (25 @ 13:30) (76 - 100)  BP: 113/69 (25 @ 13:30) (93/61 - 114/75)  RR: 22 (25 @ 13:30) (13 - 29)  SpO2: 100% (25 @ 13:30) (95% - 100%)  Wt(kg): --Vital Signs Last 24 Hrs  T(C): 36.6 (2025 13:00), Max: 37.7 (2025 08:00)  T(F): 97.9 (2025 13:00), Max: 99.8 (2025 08:00)  HR: 90 (2025 13:30) (76 - 100)  BP: 113/69 (2025 13:30) (93/61 - 114/75)  BP(mean): 86 (2025 13:30) (72 - 88)  RR: 22 (2025 13:30) (13 - 29)  SpO2: 100% (2025 13:30) (95% - 100%)    Parameters below as of 2025 11:23  Patient On (Oxygen Delivery Method): room air        PHYSICAL EXAM:  GENERAL: NAD, well-groomed, well-developed  HEAD:  Atraumatic, Normocephalic  EYES: EOMI, PERRLA, conjunctiva and sclera clear  ENMT:  Moist mucous membranes  NECK: Supple, No JVD,  CHEST/LUNG: Clear to auscultation bilaterally; No rales, rhonchi, wheezing, or rubs  HEART: Regular rate and rhythm; No murmurs, rubs, or gallops  ABDOMEN: Soft, Nontender, Nondistended; Bowel sounds present  NEURO: Alert & Oriented X3  EXTREMITIES: No LE edema, no calf tenderness  LYMPH: No lymphadenopathy noted  SKIN: No rashes or lesions    Consultant(s) Notes Reviewed:  [x ] YES  [ ] NO  Care Discussed with Consultants/Other Providers [ x] YES  [ ] NO    LABS:              CAPILLARY BLOOD GLUCOSE                RADIOLOGY & ADDITIONAL TESTS:    Imaging Personally Reviewed:  [x ] YES  [ ] NO    Assessment and Plan:    Mrs. 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 Initially started on Linezolid switched to Daptomycin. S/p bedside aspiration of LLQ collection  Cx growing Pseudomonas, switched to Cefepime. Currently  ongoing lung transplant and penicillin desensitization testing to be conducted     ===Neuro===  -AAOx3  #migraines  - ketorolac (Toradol) 30mg IV q8h PRN (or acetaminophen 1g IV q8h PRN), metoclopramide (Reglan) 10mg q8h PRN  - hold all benadryl     #peripheral vertigo  -meclizine 12.5 mg PRN     ===Cardiovascular===    # Hx of Right Heart Failure  -Hold bumetanide, spironolactone, and nifedipine.  -Heart failure signing off as repeat TTE similar to prior   - TTE: 60-65% paradoxical septal motion consistent with right ventricular volume overload, Moderate NV      #Pulmonary Hypertension:  -Continue home Remodulin subQ 51 ng/kg/min.  - MYRIAM on  without evidence of pulmonic damage   - Transplant Pulmonology following    #Chronic PE  - On Xarelto    ===Pulmonary===  #Pulmonary Hypertension:  -Continue home Remodulin subQ 51 ng/kg/min.  - Midline   - Transplant Pulmonology following    #Asthma  Continue home ipratropium (Atrovent) nebulizer PRN.  Continue home budesonide/formoterol (Pulmicort), tiotropium (Spiriva), and PRN nebulizers.  Continue fluticasone (Flonase).  Maintain supplemental O2 2L NC, keep SpO2 > 92%, avoid hyperoxia.      #Lung Transplant Evaluation  Ongoing pre-lung transplant workup.  Transplant Pulm requesting LLQ abscess I&D for medical optimization for lung transplant candidacy   Seen by pulmonary transplant service.  Patient is being considered for inpatient transplant workup per Dr. Wise.  -s/p cardiac MRI  -s/p CT colo    ===/Renal===  - no active issues    ===GI===  - no active issues    ===Hematology/Onc===  #Iron Deficiency Anemia (JULIA)  - CT colongraphy on  for further workup for iron deficiency anemia   - no evidence of polyps on CT colonography    ===ID===  #Abdominal Abscess  - Previously on linezolid 600 mg PO q12h , switched to daptomycin 6 mg/kg until   - s/p LLQ collection aspiration on  Cx growing pseudomonas, Cefepime until     #penicillin allergy   - Amoxicillin challenge today  -per allergy note on 4/10 from inpatient team was recommending a graded challenge, appears to not have occurred as of yet  - administer 10% of standard amoxicillin dose, then observe for 30 minutes  - if tolerated, administer remaining 90% of standard amoxicillin dose, then observe for 60 minutes  - if patient does not develop immediate reactions then is considered to be not allergic and penicillin allergy can be delabeled  -topical corticosteroids are okay for itching in the interterm s      SKIN:  #pedunculated mass on L anterior thigh  - evaluated by derm on 10/2024, no concern for malignancy, was told to follow up outpatient for removal   - ketorolac (Toradol) 30mg IV q8h PRN (or acetaminophen 1g IV q8h PRN), for pain relief        For Follow-Up:  [ ] F/u Transplant   [ ] Imaging for abscess resolution MICU Downgrade Note  ---------------------------    Transfer from: MICU  Transfer to:  (X) Medicine    (  ) Telemetry    (  ) RCU    (  ) Palliative    (  ) Stroke Unit    (  ) _______________  Accepting Physician: Dr. Burns      MICU COURSE    Patient is a 44y old  Female who presents with a chief complaint of pre transplant evaluation (17 Apr 2025 22:30)      HPI:  45 yo F PMH pHTN (on treprostinil infusion currently), Right sided heart failure(s/p PPM dual chamber 2016), chronic PE (dx 2017) on DOAC, SVT (s/p ablation 05/2020), asthma, h/o MRSA abdominal wall abscesses last 2/'25, and JULIA. was admitted after being sent in from outpatient pulmonologist for concern of worsening abdominal abscess in proximity to medication pump that has been unresponsive to doxycyline. Per patient she started having abdominal pain and increased swelling two weeks ago. Went into her outpatient transplant ID doctor Dr Ash and was given a course of doxycyline yesterday she presented to her pulmonologist Dr Yoon who was concerned about the size of the abscess and that it was not improving. She was sent in for further evaluation Patient denied cough, fevers, myalgias vomiting ot diarrhea during the time period of the abscess formations     ED course   Tmax 97.7, , /56 on 2L home   Not meeting SIRS criteria   Linezolid and Cefepime, Ofirmev, Morphine   (02 Apr 2025 16:47)      MICU Course:  Upgraded to MICU 4/11 lung transplant evaluation. Surgery consulted 4/10 for abscess drainage, I&D done 4/13. Initially managed on Linezolid, Daptomycin.  Abscess cultures positive for pseudomonas, and patient switched to Cefepime until 4/22.  Cardiac MRI for transplant eval conducted 4/14, Abdominal MRI conducted 4/15. CT colonography conducted 4/18:  No colonic polyp or mass. Patient documented to have penicillin allergy, Allergy consulted and recommended amoxicillin challenge which was conducted on 4/21 which was revealed as negative. Stable for floor transfer.           REVIEW OF SYSTEMS:  CONSTITUTIONAL: No fever, chills  HEENT:  No blurry vision No sinus or throat pain  NECK: No pain or stiffness  RESPIRATORY: No cough, wheezing, chills or hemoptysis; No shortness of breath  CARDIOVASCULAR: No chest pain, palpitations  GASTROINTESTINAL: No abdominal pain. No nausea, vomiting, or diarrhea  GENITOURINARY: No dysuria  NEUROLOGICAL: No HA, No focal weakness  SKIN: No itching, burning, rashes, or lesions   MUSCULOSKELETAL: No joint pain or swelling; No muscle, back, or extremity pain    MEDICATIONS:  acetaminophen     Tablet .. 1000 milliGRAM(s) Oral every 6 hours PRN  cetirizine 10 milliGRAM(s) Oral daily PRN  chlorhexidine 2% Cloths 1 Application(s) Topical <User Schedule>  EPINEPHrine     1 mG/mL Injectable 0.3 milliGRAM(s) IntraMuscular once PRN  fluticasone propionate 50 MICROgram(s)/spray Nasal Spray 1 Spray(s) Both Nostrils two times a day  ipratropium    for Nebulization 500 MICROGram(s) Nebulizer every 6 hours  ketorolac   Injectable 30 milliGRAM(s) IV Push every 8 hours PRN  meclizine 12.5 milliGRAM(s) Oral once PRN  melatonin 9 milliGRAM(s) Oral at bedtime PRN  metoclopramide 10 milliGRAM(s) Oral every 8 hours PRN  montelukast 10 milliGRAM(s) Oral daily  pantoprazole    Tablet 40 milliGRAM(s) Oral before breakfast  remodulin (treprostinil) SubQ infusion 1 Dose(s) 1 Dose(s) SubCutaneous Continuous Pump  rivaroxaban 15 milliGRAM(s) Oral with dinner      T(C): 36.6 (04-22-25 @ 13:00), Max: 37.7 (04-22-25 @ 08:00)  HR: 90 (04-22-25 @ 13:30) (76 - 100)  BP: 113/69 (04-22-25 @ 13:30) (93/61 - 114/75)  RR: 22 (04-22-25 @ 13:30) (13 - 29)  SpO2: 100% (04-22-25 @ 13:30) (95% - 100%)  Wt(kg): --Vital Signs Last 24 Hrs  T(C): 36.6 (22 Apr 2025 13:00), Max: 37.7 (22 Apr 2025 08:00)  T(F): 97.9 (22 Apr 2025 13:00), Max: 99.8 (22 Apr 2025 08:00)  HR: 90 (22 Apr 2025 13:30) (76 - 100)  BP: 113/69 (22 Apr 2025 13:30) (93/61 - 114/75)  BP(mean): 86 (22 Apr 2025 13:30) (72 - 88)  RR: 22 (22 Apr 2025 13:30) (13 - 29)  SpO2: 100% (22 Apr 2025 13:30) (95% - 100%)    Parameters below as of 22 Apr 2025 11:23  Patient On (Oxygen Delivery Method): room air        PHYSICAL EXAM:  GENERAL: NAD, well-groomed, well-developed  HEAD:  Atraumatic, Normocephalic  EYES: EOMI, PERRLA, conjunctiva and sclera clear  ENMT:  Moist mucous membranes  NECK: Supple, No JVD,  CHEST/LUNG: Clear to auscultation bilaterally; No rales, rhonchi, wheezing, or rubs  HEART: Regular rate and rhythm; No murmurs, rubs, or gallops  ABDOMEN: Soft, Nontender, Nondistended; Bowel sounds present  NEURO: Alert & Oriented X3  EXTREMITIES: No LE edema, no calf tenderness  LYMPH: No lymphadenopathy noted  SKIN: No rashes or lesions    Consultant(s) Notes Reviewed:  [x ] YES  [ ] NO  Care Discussed with Consultants/Other Providers [ x] YES  [ ] NO    LABS:              CAPILLARY BLOOD GLUCOSE                RADIOLOGY & ADDITIONAL TESTS:    Imaging Personally Reviewed:  [x ] YES  [ ] NO    Assessment and Plan:    Mrs. 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 Initially started on Linezolid switched to Daptomycin. S/p bedside aspiration of LLQ collection 4/13 Cx growing Pseudomonas, switched to Cefepime. Currently  ongoing lung transplant and penicillin desensitization testing to be conducted 4/22    ===Neuro===  -AAOx3  #migraines  - ketorolac (Toradol) 30mg IV q8h PRN (or acetaminophen 1g IV q8h PRN), metoclopramide (Reglan) 10mg q8h PRN  - hold all benadryl     #peripheral vertigo  -meclizine 12.5 mg PRN     ===Cardiovascular===    # Hx of Right Heart Failure  -Hold bumetanide, spironolactone, and nifedipine.  -Heart failure signing off as repeat TTE similar to prior   - TTE: 60-65% paradoxical septal motion consistent with right ventricular volume overload, Moderate LA      #Pulmonary Hypertension:  -Continue home Remodulin subQ 51 ng/kg/min.  - MYRIAM on 4/16 without evidence of pulmonic damage   - Transplant Pulmonology following    #Chronic PE  - On Xarelto    ===Pulmonary===  #Pulmonary Hypertension:  -Continue home Remodulin subQ 51 ng/kg/min.  - Midline 4/11  - Transplant Pulmonology following    #Asthma  Continue home ipratropium (Atrovent) nebulizer PRN.  Continue home budesonide/formoterol (Pulmicort), tiotropium (Spiriva), and PRN nebulizers.  Continue fluticasone (Flonase).  Maintain supplemental O2 2L NC, keep SpO2 > 92%, avoid hyperoxia.      #Lung Transplant Evaluation  Ongoing pre-lung transplant workup.  Transplant Pulm requesting LLQ abscess I&D for medical optimization for lung transplant candidacy   Seen by pulmonary transplant service.  Patient is being considered for inpatient transplant workup per Dr. Wise.  -s/p cardiac MRI  -s/p CT colo    ===/Renal===  - no active issues    ===GI===  - no active issues    ===Hematology/Onc===  #Iron Deficiency Anemia (JULIA)  - CT colongraphy on 4/18 for further workup for iron deficiency anemia   - no evidence of polyps on CT colonography    ===ID===  #Abdominal Abscess  - Previously on linezolid 600 mg PO q12h 4/16, switched to daptomycin 6 mg/kg until 4/19  - s/p LLQ collection aspiration on 4/13 Cx growing pseudomonas, Cefepime until 4/21    #penicillin allergy   - Amoxicillin challenge today  -per allergy note on 4/10 from inpatient team was recommending a graded challenge, appears to not have occurred as of yet  - administer 10% of standard amoxicillin dose, then observe for 30 minutes  - if tolerated, administer remaining 90% of standard amoxicillin dose, then observe for 60 minutes  - if patient does not develop immediate reactions then is considered to be not allergic and penicillin allergy can be delabeled  -topical corticosteroids are okay for itching in the interterm s      SKIN:  #pedunculated mass on L anterior thigh  - evaluated by derm on 10/2024, no concern for malignancy, was told to follow up outpatient for removal   - ketorolac (Toradol) 30mg IV q8h PRN (or acetaminophen 1g IV q8h PRN), for pain relief        For Follow-Up:  [ ] F/u Transplant   [ ] Imaging for abscess resolution

## 2025-04-23 PROCEDURE — 99233 SBSQ HOSP IP/OBS HIGH 50: CPT

## 2025-04-23 RX ORDER — METOCLOPRAMIDE HCL 10 MG
10 TABLET ORAL ONCE
Refills: 0 | Status: COMPLETED | OUTPATIENT
Start: 2025-04-23 | End: 2025-04-23

## 2025-04-23 RX ORDER — DIPHENHYDRAMINE HCL 12.5MG/5ML
25 ELIXIR ORAL ONCE
Refills: 0 | Status: COMPLETED | OUTPATIENT
Start: 2025-04-23 | End: 2025-04-23

## 2025-04-23 RX ORDER — DIPHENHYDRAMINE HCL 12.5MG/5ML
50 ELIXIR ORAL ONCE
Refills: 0 | Status: COMPLETED | OUTPATIENT
Start: 2025-04-23 | End: 2025-04-23

## 2025-04-23 RX ADMIN — Medication 1 APPLICATION(S): at 05:36

## 2025-04-23 RX ADMIN — FLUTICASONE PROPIONATE 1 SPRAY(S): 50 SPRAY, METERED NASAL at 17:32

## 2025-04-23 RX ADMIN — MONTELUKAST SODIUM 10 MILLIGRAM(S): 10 TABLET ORAL at 10:55

## 2025-04-23 RX ADMIN — KETOROLAC TROMETHAMINE 30 MILLIGRAM(S): 30 INJECTION, SOLUTION INTRAMUSCULAR; INTRAVENOUS at 10:54

## 2025-04-23 RX ADMIN — Medication 25 MILLIGRAM(S): at 10:53

## 2025-04-23 RX ADMIN — Medication 400 MILLIGRAM(S): at 00:00

## 2025-04-23 RX ADMIN — Medication 40 MILLIGRAM(S): at 05:35

## 2025-04-23 RX ADMIN — Medication 25 MILLIGRAM(S): at 15:09

## 2025-04-23 RX ADMIN — RIVAROXABAN 15 MILLIGRAM(S): 10 TABLET, FILM COATED ORAL at 17:32

## 2025-04-23 RX ADMIN — Medication 1000 MILLIGRAM(S): at 00:30

## 2025-04-23 RX ADMIN — Medication 10 MILLIGRAM(S): at 10:52

## 2025-04-23 RX ADMIN — Medication 50 MILLIGRAM(S): at 22:00

## 2025-04-23 RX ADMIN — Medication 20 MICROGRAM(S): at 14:55

## 2025-04-23 RX ADMIN — Medication 500 MICROGRAM(S): at 08:29

## 2025-04-23 RX ADMIN — KETOROLAC TROMETHAMINE 30 MILLIGRAM(S): 30 INJECTION, SOLUTION INTRAMUSCULAR; INTRAVENOUS at 11:59

## 2025-04-23 NOTE — PROGRESS NOTE ADULT - SUBJECTIVE AND OBJECTIVE BOX
Patient is a 44y old  Female who presents with a chief complaint of lung transplant eval (23 Apr 2025 11:11)        SUBJECTIVE / OVERNIGHT EVENTS: reports migraine HA. otherwise denies sob or cp. feels ok otherwise.       MEDICATIONS  (STANDING):  chlorhexidine 2% Cloths 1 Application(s) Topical <User Schedule>  fluticasone propionate 50 MICROgram(s)/spray Nasal Spray 1 Spray(s) Both Nostrils two times a day  hepatitis B Vaccine - Adult (HEPLISAV-B) 20 MICROGram(s) IntraMuscular once  ipratropium    for Nebulization 500 MICROGram(s) Nebulizer every 6 hours  montelukast 10 milliGRAM(s) Oral daily  pantoprazole    Tablet 40 milliGRAM(s) Oral before breakfast  remodulin (treprostinil) SubQ infusion 1 Dose(s) 1 Dose(s) SubCutaneous Continuous Pump  rivaroxaban 15 milliGRAM(s) Oral with dinner    MEDICATIONS  (PRN):  acetaminophen     Tablet .. 1000 milliGRAM(s) Oral every 6 hours PRN Mild Pain (1 - 3)  cetirizine 10 milliGRAM(s) Oral daily PRN Amoxicillin Challenge  diphenhydrAMINE 25 milliGRAM(s) Oral every 4 hours PRN Rash and/or Itching  EPINEPHrine     1 mG/mL Injectable 0.3 milliGRAM(s) IntraMuscular once PRN Amoxicllin Challenge  ketorolac   Injectable 30 milliGRAM(s) IV Push every 8 hours PRN Moderate Pain (4 - 6)  meclizine 12.5 milliGRAM(s) Oral once PRN Dizziness  melatonin 9 milliGRAM(s) Oral at bedtime PRN Insomnia  metoclopramide 10 milliGRAM(s) Oral every 8 hours PRN Migraine  polyethylene glycol 3350 17 Gram(s) Oral daily PRN Constipation      Vital Signs Last 24 Hrs  T(C): 36.7 (23 Apr 2025 10:54), Max: 37.5 (22 Apr 2025 18:00)  T(F): 98.1 (23 Apr 2025 10:54), Max: 99.5 (22 Apr 2025 18:00)  HR: 90 (23 Apr 2025 10:54) (82 - 108)  BP: 105/68 (23 Apr 2025 10:54) (96/63 - 114/75)  BP(mean): 82 (22 Apr 2025 20:00) (73 - 88)  RR: 18 (23 Apr 2025 10:54) (14 - 42)  SpO2: 100% (23 Apr 2025 10:54) (95% - 100%)    Parameters below as of 23 Apr 2025 10:54  Patient On (Oxygen Delivery Method): nasal cannula  O2 Flow (L/min): 2    CAPILLARY BLOOD GLUCOSE        I&O's Summary    22 Apr 2025 07:01  -  23 Apr 2025 07:00  --------------------------------------------------------  IN: 1040 mL / OUT: 0 mL / NET: 1040 mL    23 Apr 2025 07:01  -  23 Apr 2025 12:26  --------------------------------------------------------  IN: 720 mL / OUT: 0 mL / NET: 720 mL          PHYSICAL EXAM:   GENERAL: NAD   HEAD:  Atraumatic, Normocephalic  EYES:  conjunctiva and sclera clear  NECK: Supple,   CHEST/LUNG: Clear to auscultation bilaterally; No wheeze  HEART: S1S2 normal. Regular rate and rhythm; No murmurs, rubs, or gallops  ABDOMEN: Soft, Nontender, Nondistended; Bowel sounds present; healing small abd wound.   EXTREMITIES:  No clubbing, cyanosis, or edema  PSYCH/Neuro: AAOx3. Non-focal.       LABS:                < from: MR MRCP No Cont (04.15.25 @ 16:42) >  IMPRESSION:  Normal pancreas. No main pancreatic duct dilatation.    Decreased right ventral abdominal fluid collection.    Right external iliac and bilateral inguinal lymphadenopathy, similar to   recent prior CT 4/2/2025.    < end of copied text >  < from: SHANNAN W or WO Ultrasound Enhancing Agent (04.16.25 @ 16:32) >  CONCLUSIONS:      1. Left ventricular wall thickness is normal. There is paradoxical septal motion consistent with right ventricular volume overload. Left ventricular systolic function is normal with an ejection fraction visually estimated at 60 to 65 %. There are no regional wall motion abnormalities seen.   2. Mildly enlarged right ventricular cavity size and mildly reduced right ventricular systolic function.   3. Normal left and right atrial size.   4. Moderate pulmonic regurgitation.   5. Severely dilated main pulmonary artery.   6. Unspecified wire seen in right heart is visualized.   7. Structurally normal pulmonic valve with normal leaflet excursion.   8. Mild focal atheroma in the visualized portions of the descending aorta.    < end of copied text >        RADIOLOGY & ADDITIONAL TESTS:    Imaging Personally Reviewed: mr and shannan reports  Consultant(s) Notes Reviewed:  MICU, pulm txp, ID  Care Discussed with Consultants/Other Providers: pulm tx attending

## 2025-04-23 NOTE — PROGRESS NOTE ADULT - ASSESSMENT
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 Initially started on Linezolid switched to Daptomycin. S/p bedside aspiration of LLQ collection 4/13 Cx growing Pseudomonas, switched to Cefepime. Currently ongoing lung transplant and penicillin desensitization testing conducted on 4/22.

## 2025-04-23 NOTE — PROGRESS NOTE ADULT - PROBLEM SELECTOR PLAN 5
-On Xarelto.   -OOB to chair/ambulate.   #pedunculated mass on L anterior thigh. -S/p removal by plastics on 4/20, f/u path, f/u outpatient in 1 week with Dr. Ashraf.     6. Plan discussed with patient and RN and Dr. Adriana Velez txp and -On Xarelto.   -OOB to chair/ambulate.   #pedunculated mass on L anterior thigh. -S/p removal by plastics on 4/20, f/u path, f/u outpatient in 1 week with Dr. Ashraf.     6. Plan discussed with patient and RN and Dr. Adriana dominguez and CASSIUS Lakhani.

## 2025-04-23 NOTE — PROGRESS NOTE ADULT - PROBLEM SELECTOR PLAN 2
# Hx of Right Heart Failure  -Hold bumetanide, spironolactone, and nifedipine.  -Heart failure signing off as repeat TTE similar to prior   - TTE: 60-65% paradoxical septal motion consistent with right ventricular volume overload, Moderate CT      #Pulmonary Hypertension:  -Continue home Remodulin subQ 51 ng/kg/min.  - MYRIAM on 4/16 without evidence of pulmonic damage   - Transplant Pulmonology following  -On 2L O2 via NC baseline.     #Chronic PE  -C/w xarelto.

## 2025-04-23 NOTE — PROGRESS NOTE ADULT - PROBLEM SELECTOR PLAN 1
Ongoing pre-lung transplant workup.  Transplant Pulm requesting LLQ abscess I&D for medical optimization for lung transplant candidacy   Seen by pulmonary transplant service. -F/u txp ID recs. HBV vaccine ordered.   Patient is being considered for inpatient transplant workup per Dr. Wise.  -s/p cardiac MRI  -s/p CT colo  -S/p PCN desensitization on 4/22 in MICU. Tolerated.  - Transplant Pulmonology following; they discussed with Dr. Yoon who is ok with patient being DC'ed tomorrow.  #h/o Asthma  Continue home ipratropium (Atrovent) nebulizer   -home budesonide/formoterol (Pulmicort), tiotropium (Spiriva), and PRN nebulizers - currently not ordered.  Continue fluticasone (Flonase).  Maintain home supplemental O2 2L NC, keep SpO2 > 92%, avoid hyperoxia.

## 2025-04-23 NOTE — PROGRESS NOTE ADULT - PROBLEM SELECTOR PLAN 4
#migraines  - ketorolac (Toradol) 30mg IV q8h PRN (or acetaminophen 1g IV q8h PRN), metoclopramide (Reglan) 10mg q8h PRN  - getting prn po benadryl now.     #peripheral vertigo  -meclizine 12.5 mg PRN

## 2025-04-23 NOTE — PROGRESS NOTE ADULT - PROBLEM SELECTOR PLAN 3
#Abdominal Abscess  - Previously on linezolid 600 mg PO q12h 4/16, switched to daptomycin 6 mg/kg until 4/19  - s/p LLQ collection aspiration on 4/13 Cx growing pseudomonas, Cefepime until 4/21  -F/u Txp ID recs.     #Iron Deficiency Anemia (JULIA)  - CT colonography on 4/18 for further workup for iron deficiency anemia   - no evidence of polyps on CT colonography  -Monitor Hgb.

## 2025-04-24 ENCOUNTER — TRANSCRIPTION ENCOUNTER (OUTPATIENT)
Age: 45
End: 2025-04-24

## 2025-04-24 LAB — NT-PROBNP SERPL-SCNC: 176 PG/ML — SIGNIFICANT CHANGE UP (ref 0–300)

## 2025-04-24 PROCEDURE — 82947 ASSAY GLUCOSE BLOOD QUANT: CPT

## 2025-04-24 PROCEDURE — 76937 US GUIDE VASCULAR ACCESS: CPT

## 2025-04-24 PROCEDURE — 84702 CHORIONIC GONADOTROPIN TEST: CPT

## 2025-04-24 PROCEDURE — 83540 ASSAY OF IRON: CPT

## 2025-04-24 PROCEDURE — 80053 COMPREHEN METABOLIC PANEL: CPT

## 2025-04-24 PROCEDURE — 83020 HEMOGLOBIN ELECTROPHORESIS: CPT

## 2025-04-24 PROCEDURE — 82787 IGG 1 2 3 OR 4 EACH: CPT

## 2025-04-24 PROCEDURE — 36415 COLL VENOUS BLD VENIPUNCTURE: CPT

## 2025-04-24 PROCEDURE — 83605 ASSAY OF LACTIC ACID: CPT

## 2025-04-24 PROCEDURE — 82330 ASSAY OF CALCIUM: CPT

## 2025-04-24 PROCEDURE — 90739 HEPB VACC 2/4 DOSE ADULT IM: CPT

## 2025-04-24 PROCEDURE — 83735 ASSAY OF MAGNESIUM: CPT

## 2025-04-24 PROCEDURE — 99233 SBSQ HOSP IP/OBS HIGH 50: CPT

## 2025-04-24 PROCEDURE — 84100 ASSAY OF PHOSPHORUS: CPT

## 2025-04-24 PROCEDURE — 94640 AIRWAY INHALATION TREATMENT: CPT

## 2025-04-24 PROCEDURE — 96375 TX/PRO/DX INJ NEW DRUG ADDON: CPT

## 2025-04-24 PROCEDURE — 85301 ANTITHROMBIN III ANTIGEN: CPT

## 2025-04-24 PROCEDURE — 74181 MRI ABDOMEN W/O CONTRAST: CPT | Mod: MC

## 2025-04-24 PROCEDURE — 99285 EMERGENCY DEPT VISIT HI MDM: CPT | Mod: 25

## 2025-04-24 PROCEDURE — 74177 CT ABD & PELVIS W/CONTRAST: CPT | Mod: MC

## 2025-04-24 PROCEDURE — 85613 RUSSELL VIPER VENOM DILUTED: CPT

## 2025-04-24 PROCEDURE — 82803 BLOOD GASES ANY COMBINATION: CPT

## 2025-04-24 PROCEDURE — 87641 MR-STAPH DNA AMP PROBE: CPT

## 2025-04-24 PROCEDURE — 85025 COMPLETE CBC W/AUTO DIFF WBC: CPT

## 2025-04-24 PROCEDURE — 87205 SMEAR GRAM STAIN: CPT

## 2025-04-24 PROCEDURE — 93306 TTE W/DOPPLER COMPLETE: CPT

## 2025-04-24 PROCEDURE — 85307 ASSAY ACTIVATED PROTEIN C: CPT

## 2025-04-24 PROCEDURE — 86765 RUBEOLA ANTIBODY: CPT

## 2025-04-24 PROCEDURE — 74261 CT COLONOGRAPHY DX: CPT | Mod: MC

## 2025-04-24 PROCEDURE — 85652 RBC SED RATE AUTOMATED: CPT

## 2025-04-24 PROCEDURE — 86901 BLOOD TYPING SEROLOGIC RH(D): CPT

## 2025-04-24 PROCEDURE — 87640 STAPH A DNA AMP PROBE: CPT

## 2025-04-24 PROCEDURE — 71046 X-RAY EXAM CHEST 2 VIEWS: CPT

## 2025-04-24 PROCEDURE — C1751: CPT

## 2025-04-24 PROCEDURE — 83090 ASSAY OF HOMOCYSTEINE: CPT

## 2025-04-24 PROCEDURE — C1769: CPT

## 2025-04-24 PROCEDURE — 86850 RBC ANTIBODY SCREEN: CPT

## 2025-04-24 PROCEDURE — 85610 PROTHROMBIN TIME: CPT

## 2025-04-24 PROCEDURE — 99239 HOSP IP/OBS DSCHRG MGMT >30: CPT

## 2025-04-24 PROCEDURE — 97161 PT EVAL LOW COMPLEX 20 MIN: CPT

## 2025-04-24 PROCEDURE — 80048 BASIC METABOLIC PNL TOTAL CA: CPT

## 2025-04-24 PROCEDURE — 83880 ASSAY OF NATRIURETIC PEPTIDE: CPT

## 2025-04-24 PROCEDURE — 86900 BLOOD TYPING SEROLOGIC ABO: CPT

## 2025-04-24 PROCEDURE — 86762 RUBELLA ANTIBODY: CPT

## 2025-04-24 PROCEDURE — 87077 CULTURE AEROBIC IDENTIFY: CPT

## 2025-04-24 PROCEDURE — 82962 GLUCOSE BLOOD TEST: CPT

## 2025-04-24 PROCEDURE — A9585: CPT

## 2025-04-24 PROCEDURE — 86787 VARICELLA-ZOSTER ANTIBODY: CPT

## 2025-04-24 PROCEDURE — 87040 BLOOD CULTURE FOR BACTERIA: CPT

## 2025-04-24 PROCEDURE — 36569 INSJ PICC 5 YR+ W/O IMAGING: CPT

## 2025-04-24 PROCEDURE — 85018 HEMOGLOBIN: CPT

## 2025-04-24 PROCEDURE — 81003 URINALYSIS AUTO W/O SCOPE: CPT

## 2025-04-24 PROCEDURE — 36410 VNPNXR 3YR/> PHY/QHP DX/THER: CPT

## 2025-04-24 PROCEDURE — 82550 ASSAY OF CK (CPK): CPT

## 2025-04-24 PROCEDURE — 85014 HEMATOCRIT: CPT

## 2025-04-24 PROCEDURE — 86140 C-REACTIVE PROTEIN: CPT

## 2025-04-24 PROCEDURE — 87070 CULTURE OTHR SPECIMN AEROBIC: CPT

## 2025-04-24 PROCEDURE — 87186 SC STD MICRODIL/AGAR DIL: CPT

## 2025-04-24 PROCEDURE — 85027 COMPLETE CBC AUTOMATED: CPT

## 2025-04-24 PROCEDURE — 87075 CULTR BACTERIA EXCEPT BLOOD: CPT

## 2025-04-24 PROCEDURE — C1894: CPT

## 2025-04-24 PROCEDURE — 85303 CLOT INHIBIT PROT C ACTIVITY: CPT

## 2025-04-24 PROCEDURE — 84295 ASSAY OF SERUM SODIUM: CPT

## 2025-04-24 PROCEDURE — 81241 F5 GENE: CPT

## 2025-04-24 PROCEDURE — 86735 MUMPS ANTIBODY: CPT

## 2025-04-24 PROCEDURE — 80061 LIPID PANEL: CPT

## 2025-04-24 PROCEDURE — 86146 BETA-2 GLYCOPROTEIN ANTIBODY: CPT

## 2025-04-24 PROCEDURE — 88305 TISSUE EXAM BY PATHOLOGIST: CPT

## 2025-04-24 PROCEDURE — 75561 CARDIAC MRI FOR MORPH W/DYE: CPT

## 2025-04-24 PROCEDURE — 84132 ASSAY OF SERUM POTASSIUM: CPT

## 2025-04-24 PROCEDURE — 85300 ANTITHROMBIN III ACTIVITY: CPT

## 2025-04-24 PROCEDURE — 93325 DOPPLER ECHO COLOR FLOW MAPG: CPT

## 2025-04-24 PROCEDURE — 96374 THER/PROPH/DIAG INJ IV PUSH: CPT

## 2025-04-24 PROCEDURE — 81240 F2 GENE: CPT

## 2025-04-24 PROCEDURE — 85730 THROMBOPLASTIN TIME PARTIAL: CPT

## 2025-04-24 PROCEDURE — 83550 IRON BINDING TEST: CPT

## 2025-04-24 PROCEDURE — 76705 ECHO EXAM OF ABDOMEN: CPT

## 2025-04-24 PROCEDURE — 93312 ECHO TRANSESOPHAGEAL: CPT

## 2025-04-24 PROCEDURE — 93005 ELECTROCARDIOGRAM TRACING: CPT

## 2025-04-24 PROCEDURE — 70450 CT HEAD/BRAIN W/O DYE: CPT | Mod: MC

## 2025-04-24 PROCEDURE — 86147 CARDIOLIPIN ANTIBODY EA IG: CPT

## 2025-04-24 PROCEDURE — 77001 FLUOROGUIDE FOR VEIN DEVICE: CPT

## 2025-04-24 PROCEDURE — 93320 DOPPLER ECHO COMPLETE: CPT

## 2025-04-24 PROCEDURE — 71045 X-RAY EXAM CHEST 1 VIEW: CPT

## 2025-04-24 PROCEDURE — 82728 ASSAY OF FERRITIN: CPT

## 2025-04-24 PROCEDURE — 85306 CLOT INHIBIT PROT S FREE: CPT

## 2025-04-24 PROCEDURE — 82435 ASSAY OF BLOOD CHLORIDE: CPT

## 2025-04-24 PROCEDURE — 82784 ASSAY IGA/IGD/IGG/IGM EACH: CPT

## 2025-04-24 RX ORDER — METOCLOPRAMIDE HCL 10 MG
1 TABLET ORAL
Qty: 90 | Refills: 0
Start: 2025-04-24 | End: 2025-05-23

## 2025-04-24 RX ORDER — ACETAMINOPHEN 500 MG/5ML
2 LIQUID (ML) ORAL
Qty: 0 | Refills: 0 | DISCHARGE
Start: 2025-04-24

## 2025-04-24 RX ORDER — MECLIZINE HCL 12.5 MG
1 TABLET ORAL
Qty: 0 | Refills: 0 | DISCHARGE
Start: 2025-04-24

## 2025-04-24 RX ORDER — BUMETANIDE 1 MG/1
1 TABLET ORAL
Qty: 30 | Refills: 0
Start: 2025-04-24 | End: 2025-05-23

## 2025-04-24 RX ORDER — POLYETHYLENE GLYCOL 3350 17 G/17G
17 POWDER, FOR SOLUTION ORAL
Qty: 0 | Refills: 0 | DISCHARGE
Start: 2025-04-24

## 2025-04-24 RX ORDER — MECLIZINE HCL 12.5 MG
1 TABLET ORAL
Qty: 30 | Refills: 0
Start: 2025-04-24 | End: 2025-05-23

## 2025-04-24 RX ORDER — SPIRONOLACTONE 25 MG
2 TABLET ORAL
Qty: 120 | Refills: 0
Start: 2025-04-24 | End: 2025-05-23

## 2025-04-24 RX ORDER — POLYETHYLENE GLYCOL 3350 17 G/17G
17 POWDER, FOR SOLUTION ORAL
Qty: 510 | Refills: 0
Start: 2025-04-24 | End: 2025-05-23

## 2025-04-24 RX ORDER — NIFEDIPINE 30 MG
1 TABLET, EXTENDED RELEASE 24 HR ORAL
Qty: 30 | Refills: 0
Start: 2025-04-24 | End: 2025-05-23

## 2025-04-24 RX ORDER — DIPHENHYDRAMINE HCL 12.5MG/5ML
1 ELIXIR ORAL
Qty: 0 | Refills: 0 | DISCHARGE
Start: 2025-04-24

## 2025-04-24 RX ORDER — DIPHENHYDRAMINE HCL 12.5MG/5ML
1 ELIXIR ORAL
Qty: 180 | Refills: 0
Start: 2025-04-24 | End: 2025-05-23

## 2025-04-24 RX ORDER — METOCLOPRAMIDE HCL 10 MG
1 TABLET ORAL
Qty: 0 | Refills: 0 | DISCHARGE
Start: 2025-04-24

## 2025-04-24 RX ADMIN — MONTELUKAST SODIUM 10 MILLIGRAM(S): 10 TABLET ORAL at 10:34

## 2025-04-24 RX ADMIN — Medication 25 MILLIGRAM(S): at 08:53

## 2025-04-24 RX ADMIN — Medication 40 MILLIGRAM(S): at 05:08

## 2025-04-24 RX ADMIN — Medication 1 APPLICATION(S): at 05:08

## 2025-04-24 NOTE — PROGRESS NOTE ADULT - PROBLEM SELECTOR PLAN 3
#Abdominal Abscess  - Previously on linezolid 600 mg PO q12h 4/16, switched to daptomycin 6 mg/kg until 4/19  - s/p LLQ collection aspiration on 4/13 Cx growing pseudomonas, Cefepime until 4/21 -completed abx per ID.   -F/u Txp ID recs.     #Iron Deficiency Anemia (JULIA)  - CT colonography on 4/18 for further workup for iron deficiency anemia   - no evidence of polyps on CT colonography  -Monitor Hgb.

## 2025-04-24 NOTE — DISCHARGE NOTE NURSING/CASE MANAGEMENT/SOCIAL WORK - NSDCVIVACCINE_GEN_ALL_CORE_FT
HepB-CpG; 23-Apr-2025 14:55; Sagar Hansen); [employer] [id number] (Exp. Date: 31-Jan-2027); IntraMuscular; Deltoid Left.; 20 MICROGram(s); VIS (VIS Published: 31-Jan-2025, VIS Presented: 23-Apr-2025);

## 2025-04-24 NOTE — DISCHARGE NOTE NURSING/CASE MANAGEMENT/SOCIAL WORK - PATIENT PORTAL LINK FT
You can access the FollowMyHealth Patient Portal offered by [facility] by registering at the following website: [email or web address]. By joining [email or web address]’s FollowMyHealth portal, you will also be able to view your health information using other applications (apps) compatible with our system.

## 2025-04-24 NOTE — PROGRESS NOTE ADULT - ASSESSMENT
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 This hospitalization she has had hypotension to low 80s (starting midodrine 4/9)  Neurology consulted 4/9/25 for headaches and vertigo.  -Headaches similar to prior but more frequent and more severe. +throbbing +photophobia . Responds to oxycodone / hydromorphone but not acetaminophen  -Vertigo is positional and paroxysmal. Responded well to meclizine 12.5mg x 1  -Neuro exam with left corrective saccade on right head impulse, only end gaze nystagmus, and no skew   She has chronic headaches for last 2 years. Headaches were holocephalic, perhaps worse posteriorly, throbb  CTH (4/7/25) unrevealing  s/p LLQ collection aspiration   s/p MRCP 4/15   c/o LE pain     Impression:  1) Migraine without aura, worse in setting of acute medical illness; imporved   2) Peripheral vertigo; better    Recommendations  #Migraine without aura  Baseline 1 episode per week, now daily and more intense while hospitalized responsive to opioids here, less so to acetaminophen   -Consider non-opioid treatment of head pain  -First line: recommend migraine medications (to be given all together at the same time if no contraindications form comorbitities): ketorolac (Toradol) 30mg IV q8h PRN (or acetaminophen 1g IV q8h PRN), metoclopramide (Reglan) 10mg q8h PRN, diphenhydramine (Benadryl) 25mg IV q8h PRN. Please repeat at least 2-3 cycles for medications to be effective.  -IV hydration, Mg 2g IV x1    #Peripheral Vertigo  -Physical therapy  -Vestibular Rehab Referral  -ENT Referral  -[email or web address] for patient education  -Check orthostatic vitals   -IV Fluids as safe/able (after orthostatic vitals) consider bolus and then maintenance fluids if no contraindications  -Meclizine PRN, she responded well to 1x dose of 12.5mg    -remaining per primary team/ICU   - lung transplant evaluation in progress   - xarelto for PE    dc planning   Triston Alexandre MD  Vascular Neurology  Office: [phone] .

## 2025-04-24 NOTE — PROGRESS NOTE ADULT - PROBLEM/PLAN-2
DISPLAY PLAN FREE TEXT
Next office visit: 5/5/22  Last office visit: 12/23/21, canceled 3/24/22    Last refill: 3/1/22  Medication(s) Requested:   lisdexamfetamine (VYVANSE) 50 MG capsule  Take 1 capsule by mouth every morning      Is the patient due for refill of this medication(s): Yes  PDMP review: Criteria met. Forwarded to Physician/HERMINIO for signature.     Last Note Reviewed: She agrees to continue Vyvanse 50 mg daily  RTC 1 month       
DISPLAY PLAN FREE TEXT

## 2025-04-24 NOTE — PROGRESS NOTE ADULT - SUBJECTIVE AND OBJECTIVE BOX
CHIEF COMPLAINT:     Interval Events:    REVIEW OF SYSTEMS:  Constitutional: [ ] negative [ ] fevers [ ] chills [ ] weight loss [ ] weight gain  HEENT: [ ] negative [ ] dry eyes [ ] eye irritation [ ] postnasal drip [ ] nasal congestion  CV: [ ] negative  [ ] chest pain [ ] orthopnea [ ] palpitations [ ] murmur  Resp: [ ] negative [ ] cough [ ] shortness of breath [ ] dyspnea [ ] wheezing [ ] sputum [ ] hemoptysis  GI: [ ] negative [ ] nausea [ ] vomiting [ ] diarrhea [ ] constipation [ ] abd pain [ ] dysphagia   : [ ] negative [ ] dysuria [ ] nocturia [ ] hematuria [ ] increased urinary frequency  Musculoskeletal: [ ] negative [ ] back pain [ ] myalgias [ ] arthralgias [ ] fracture  Skin: [ ] negative [ ] rash [ ] itch  Neurological: [ ] negative [ ] headache [ ] dizziness [ ] syncope [ ] weakness [ ] numbness  Psychiatric: [ ] negative [ ] anxiety [ ] depression  Endocrine: [ ] negative [ ] diabetes [ ] thyroid problem  Hematologic/Lymphatic: [ ] negative [ ] anemia [ ] bleeding problem  Allergic/Immunologic: [ ] negative [ ] itchy eyes [ ] nasal discharge [ ] hives [ ] angioedema  [ ] All other systems negative  [ ] Unable to assess ROS because ________    OBJECTIVE:  ICU Vital Signs Last 24 Hrs  T(C): 36.9 (24 Apr 2025 11:15), Max: 37.1 (23 Apr 2025 23:43)  T(F): 98.4 (24 Apr 2025 11:15), Max: 98.8 (23 Apr 2025 23:43)  HR: 99 (24 Apr 2025 11:15) (92 - 99)  BP: 106/77 (24 Apr 2025 11:15) (101/71 - 106/77)  BP(mean): --  ABP: --  ABP(mean): --  RR: 18 (24 Apr 2025 11:15) (18 - 18)  SpO2: 100% (24 Apr 2025 11:15) (93% - 100%)    O2 Parameters below as of 24 Apr 2025 11:15  Patient On (Oxygen Delivery Method): nasal cannula  O2 Flow (L/min): 2            04-23 @ 07:01  -  04-24 @ 07:00  --------------------------------------------------------  IN: 720 mL / OUT: 0 mL / NET: 720 mL    04-24 @ 07:01  - 04-24 @ 12:06  --------------------------------------------------------  IN: 600 mL / OUT: 0 mL / NET: 600 mL      CAPILLARY BLOOD GLUCOSE          PHYSICAL EXAM:  General:   HEENT:   Lymph Nodes:  Neck:   Respiratory:   Cardiovascular:   Abdomen:   Extremities:   Skin:   Neurological:  Psychiatry:    HOSPITAL MEDICATIONS:  MEDICATIONS  (STANDING):  chlorhexidine 2% Cloths 1 Application(s) Topical <User Schedule>  fluticasone propionate 50 MICROgram(s)/spray Nasal Spray 1 Spray(s) Both Nostrils two times a day  ipratropium    for Nebulization 500 MICROGram(s) Nebulizer every 6 hours  montelukast 10 milliGRAM(s) Oral daily  pantoprazole    Tablet 40 milliGRAM(s) Oral before breakfast  remodulin (treprostinil) SubQ infusion 1 Dose(s) 1 Dose(s) SubCutaneous Continuous Pump  rivaroxaban 15 milliGRAM(s) Oral with dinner    MEDICATIONS  (PRN):  acetaminophen     Tablet .. 1000 milliGRAM(s) Oral every 6 hours PRN Mild Pain (1 - 3)  cetirizine 10 milliGRAM(s) Oral daily PRN Amoxicillin Challenge  diphenhydrAMINE 25 milliGRAM(s) Oral every 4 hours PRN Rash and/or Itching  EPINEPHrine     1 mG/mL Injectable 0.3 milliGRAM(s) IntraMuscular once PRN Amoxicllin Challenge  ketorolac   Injectable 30 milliGRAM(s) IV Push every 8 hours PRN Moderate Pain (4 - 6)  meclizine 12.5 milliGRAM(s) Oral once PRN Dizziness  melatonin 9 milliGRAM(s) Oral at bedtime PRN Insomnia  metoclopramide 10 milliGRAM(s) Oral every 8 hours PRN Migraine  polyethylene glycol 3350 17 Gram(s) Oral daily PRN Constipation      LABS:    Hgb Trend: 9.3<--, 9.7<--, 9.8<--        Creatinine Trend: 0.91<--, 0.94<--, 0.96<--, 1.04<--, 0.98<--, 1.00<--            MICROBIOLOGY:       RADIOLOGY:  [ ] Reviewed and interpreted by me    PULMONARY FUNCTION TESTS:    EKG: CHIEF COMPLAINT: Abdominal pain    Interval Events: No acute events    REVIEW OF SYSTEMS:  Constitutional: [ ] negative [ ] fevers [ ] chills [ ] weight loss [ ] weight gain  HEENT: [ ] negative [ ] dry eyes [ ] eye irritation [ ] postnasal drip [ ] nasal congestion  CV: [ ] negative  [ ] chest pain [ ] orthopnea [ ] palpitations [ ] murmur  Resp: [ ] negative [ ] cough [ ] shortness of breath [ ] dyspnea [ ] wheezing [ ] sputum [ ] hemoptysis  GI: [ ] negative [ ] nausea [ ] vomiting [ ] diarrhea [ ] constipation [ ] abd pain [ ] dysphagia   : [ ] negative [ ] dysuria [ ] nocturia [ ] hematuria [ ] increased urinary frequency  Musculoskeletal: [ ] negative [ ] back pain [ ] myalgias [ ] arthralgias [ ] fracture  Skin: [ ] negative [ ] rash [ ] itch  Neurological: [ ] negative [ ] headache [ ] dizziness [ ] syncope [ ] weakness [ ] numbness  Psychiatric: [ ] negative [ ] anxiety [ ] depression  Endocrine: [ ] negative [ ] diabetes [ ] thyroid problem  Hematologic/Lymphatic: [ ] negative [ ] anemia [ ] bleeding problem  Allergic/Immunologic: [ ] negative [ ] itchy eyes [ ] nasal discharge [ ] hives [ ] angioedema  [X] All other systems negative  [ ] Unable to assess ROS because ________    OBJECTIVE:  ICU Vital Signs Last 24 Hrs  T(C): 36.9 (24 Apr 2025 11:15), Max: 37.1 (23 Apr 2025 23:43)  T(F): 98.4 (24 Apr 2025 11:15), Max: 98.8 (23 Apr 2025 23:43)  HR: 99 (24 Apr 2025 11:15) (92 - 99)  BP: 106/77 (24 Apr 2025 11:15) (101/71 - 106/77)  BP(mean): --  ABP: --  ABP(mean): --  RR: 18 (24 Apr 2025 11:15) (18 - 18)  SpO2: 100% (24 Apr 2025 11:15) (93% - 100%)    O2 Parameters below as of 24 Apr 2025 11:15  Patient On (Oxygen Delivery Method): nasal cannula  O2 Flow (L/min): 2            04-23 @ 07:01  -  04-24 @ 07:00  --------------------------------------------------------  IN: 720 mL / OUT: 0 mL / NET: 720 mL    04-24 @ 07:01  - 04-24 @ 12:06  --------------------------------------------------------  IN: 600 mL / OUT: 0 mL / NET: 600 mL      CAPILLARY BLOOD GLUCOSE          PHYSICAL EXAM:  General: NAD, resting comforatbly  HEENT: EOMI, PERRLA  Neck: Supple  Respiratory: CTAB  Cardiovascular: S1S2, RRR  Abdomen: Soft, NTND, BS+. Bandage noted anteriorly, non tender  Extremities: No edema  Skin: Warma nd dry  Neurological: No gross focal deficits    HOSPITAL MEDICATIONS:  MEDICATIONS  (STANDING):  chlorhexidine 2% Cloths 1 Application(s) Topical <User Schedule>  fluticasone propionate 50 MICROgram(s)/spray Nasal Spray 1 Spray(s) Both Nostrils two times a day  ipratropium    for Nebulization 500 MICROGram(s) Nebulizer every 6 hours  montelukast 10 milliGRAM(s) Oral daily  pantoprazole    Tablet 40 milliGRAM(s) Oral before breakfast  remodulin (treprostinil) SubQ infusion 1 Dose(s) 1 Dose(s) SubCutaneous Continuous Pump  rivaroxaban 15 milliGRAM(s) Oral with dinner    MEDICATIONS  (PRN):  acetaminophen     Tablet .. 1000 milliGRAM(s) Oral every 6 hours PRN Mild Pain (1 - 3)  cetirizine 10 milliGRAM(s) Oral daily PRN Amoxicillin Challenge  diphenhydrAMINE 25 milliGRAM(s) Oral every 4 hours PRN Rash and/or Itching  EPINEPHrine     1 mG/mL Injectable 0.3 milliGRAM(s) IntraMuscular once PRN Amoxicllin Challenge  ketorolac   Injectable 30 milliGRAM(s) IV Push every 8 hours PRN Moderate Pain (4 - 6)  meclizine 12.5 milliGRAM(s) Oral once PRN Dizziness  melatonin 9 milliGRAM(s) Oral at bedtime PRN Insomnia  metoclopramide 10 milliGRAM(s) Oral every 8 hours PRN Migraine  polyethylene glycol 3350 17 Gram(s) Oral daily PRN Constipation      LABS:    Hgb Trend: 9.3<--, 9.7<--, 9.8<--        Creatinine Trend: 0.91<--, 0.94<--, 0.96<--, 1.04<--, 0.98<--, 1.00<--            MICROBIOLOGY:       RADIOLOGY:  [ ] Reviewed and interpreted by me    PULMONARY FUNCTION TESTS:    EKG:

## 2025-04-24 NOTE — PROGRESS NOTE ADULT - PROBLEM SELECTOR PLAN 1
Ongoing pre-lung transplant workup.  Transplant Pulm requesting LLQ abscess I&D for medical optimization for lung transplant candidacy   Seen by pulmonary transplant service. -F/u txp ID recs. HBV vaccine ordered -given dose 4/23.   Patient is being considered for inpatient transplant workup per Dr. Wise.  -s/p cardiac MRI  -s/p CT colo  -S/p PCN desensitization on 4/22 in MICU. Tolerated.  - Transplant Pulmonology following; they discussed with Dr. Yoon who is ok with patient being DC'ed today.  #h/o Asthma  Continue home ipratropium (Atrovent) nebulizer   -home budesonide/formoterol (Pulmicort), tiotropium (Spiriva), and PRN nebulizers - currently not ordered. -f/u outpt.   Continue fluticasone (Flonase).  Maintain home supplemental O2 2L NC, keep SpO2 > 92%, avoid hyperoxia.

## 2025-04-24 NOTE — PROGRESS NOTE ADULT - ASSESSMENT
Patient is a 43 yo F w/ pHTN (Group 1 PAH, on Treprostinil and Sildenafil), chronic PE/UE DVT on Xarelto, Asthma, CHF s/p PPM, SVT s/p ablation, MRSA abdominal wall abscess who was initially admitted on 4/2 after p/w worsening abdominal abscess near Trepostinil pump site, unresponsive to Doxycycline. During this hospital course, she underwent I&D on 4/13 with abscess cultures positive for Pseudomonas and treated with Cefepime through 4/22. She also had amoxicilin challenge as per Allergy for documented penicillin allergy which was negative. Patient also had testing for lung transplant workup including cardiac MRI, abdominal MRI, and CT colonography    #pHTN - On home Trepostinil 51ng/kg/min, Sildenafil 20mg TID, diuretics  #Chronic hypoxemic respiratory failure - On 2 to 3 L nc O2  #Asthma - On Fasenra  - c/w home Remodulin 51ng/kg/min   - Please check BNP now to determine if diuretics should be resumed prior to discharge  - Will resume and uptitrate Sildenafil as outpatient  - c/w home asthma regimen    Lg Lucas MD  Pulmonary & Critical Care   Patient is a 43 yo F w/ pHTN (Group 1 PAH, on Treprostinil and Sildenafil), chronic PE/UE DVT on Xarelto, Asthma, CHF s/p PPM, SVT s/p ablation, MRSA abdominal wall abscess who was initially admitted on  after p/w worsening abdominal abscess near Trepostinil pump site, unresponsive to Doxycycline. During this hospital course, she underwent I&D on  with abscess cultures positive for Pseudomonas and treated with Cefepime through . She also had amoxicilin challenge as per Allergy for documented penicillin allergy which was negative. Patient also had testing for lung transplant workup including cardiac MRI, abdominal MRI, and CT colonography    #pHTN - On home Trepostinil 51ng/kg/min, Sildenafil 20mg TID, diuretics  #Chronic hypoxemic respiratory failure - On 2 to 3 L nc O2  #Asthma - On Fasenra  - c/w home Remodulin 51ng/kg/min   - , will hold diuretics until outpatient followup  - Will resume and uptitrate Sildenafil as outpatient  - c/w home asthma regimen  - Needs outpatient pulmonary follow up with Dr. Yoon upon discharge at [address] [place] [address], [address] ([phone]).  Please request "HOME Pulmonary Follow-up" in the discharge token or Email [email or web address]" to arrange televisit two days after discharge, with outpatient pulmonary follow-up afterwards.  Please include the name, , and a phone number for you and the patient).      Lg Lucas MD  Pulmonary & Critical Care

## 2025-04-24 NOTE — PROGRESS NOTE ADULT - PROBLEM SELECTOR PLAN 2
# Hx of Right Heart Failure  -Hold bumetanide, spironolactone, and nifedipine. -d/w pulm, f/u outpt for possible resumption.   -Heart failure signing off as repeat TTE similar to prior   - TTE: 60-65% paradoxical septal motion consistent with right ventricular volume overload, Moderate HI      #Pulmonary Hypertension:  -Continue home Remodulin subQ 51 ng/kg/min.  - MYRIAM on 4/16 without evidence of pulmonic damage   - Transplant Pulmonology following  -On 2L O2 via NC baseline.   -Home sildenafil on hold, f/u outpt for possible resumption per d/w pulm.     #Chronic PE  -C/w xarelto.

## 2025-04-24 NOTE — PROGRESS NOTE ADULT - SUBJECTIVE AND OBJECTIVE BOX
Patient is a 44y old  Female who presents with a chief complaint of lung transplant eval (23 Apr 2025 11:11)        SUBJECTIVE / OVERNIGHT EVENTS: no complaints today.       MEDICATIONS  (STANDING):  chlorhexidine 2% Cloths 1 Application(s) Topical <User Schedule>  fluticasone propionate 50 MICROgram(s)/spray Nasal Spray 1 Spray(s) Both Nostrils two times a day  ipratropium    for Nebulization 500 MICROGram(s) Nebulizer every 6 hours  montelukast 10 milliGRAM(s) Oral daily  pantoprazole    Tablet 40 milliGRAM(s) Oral before breakfast  remodulin (treprostinil) SubQ infusion 1 Dose(s) 1 Dose(s) SubCutaneous Continuous Pump  rivaroxaban 15 milliGRAM(s) Oral with dinner    MEDICATIONS  (PRN):  acetaminophen     Tablet .. 1000 milliGRAM(s) Oral every 6 hours PRN Mild Pain (1 - 3)  cetirizine 10 milliGRAM(s) Oral daily PRN Amoxicillin Challenge  diphenhydrAMINE 25 milliGRAM(s) Oral every 4 hours PRN Rash and/or Itching  EPINEPHrine     1 mG/mL Injectable 0.3 milliGRAM(s) IntraMuscular once PRN Amoxicllin Challenge  ketorolac   Injectable 30 milliGRAM(s) IV Push every 8 hours PRN Moderate Pain (4 - 6)  meclizine 12.5 milliGRAM(s) Oral once PRN Dizziness  melatonin 9 milliGRAM(s) Oral at bedtime PRN Insomnia  metoclopramide 10 milliGRAM(s) Oral every 8 hours PRN Migraine  polyethylene glycol 3350 17 Gram(s) Oral daily PRN Constipation      Vital Signs Last 24 Hrs  T(C): 36.9 (24 Apr 2025 11:15), Max: 37.1 (23 Apr 2025 23:43)  T(F): 98.4 (24 Apr 2025 11:15), Max: 98.8 (23 Apr 2025 23:43)  HR: 99 (24 Apr 2025 11:15) (92 - 99)  BP: 106/77 (24 Apr 2025 11:15) (104/73 - 106/77)  BP(mean): --  RR: 18 (24 Apr 2025 11:15) (18 - 18)  SpO2: 100% (24 Apr 2025 11:15) (93% - 100%)    Parameters below as of 24 Apr 2025 11:15  Patient On (Oxygen Delivery Method): nasal cannula  O2 Flow (L/min): 2    CAPILLARY BLOOD GLUCOSE        I&O's Summary    23 Apr 2025 07:01  -  24 Apr 2025 07:00  --------------------------------------------------------  IN: 720 mL / OUT: 0 mL / NET: 720 mL    24 Apr 2025 07:01  -  24 Apr 2025 18:05  --------------------------------------------------------  IN: 600 mL / OUT: 0 mL / NET: 600 mL          PHYSICAL EXAM:   GENERAL: NAD, well-developed  HEAD:  Atraumatic, Normocephalic  EYES: EOMI, PERRLA, conjunctiva and sclera clear  NECK: Supple, No JVD  CHEST/LUNG: Clear to auscultation bilaterally; No wheeze  HEART: S1S2 normal. Regular rate and rhythm; No murmurs, rubs, or gallops  ABDOMEN: Soft, Nontender, Nondistended; Bowel sounds present  EXTREMITIES:  2+ Peripheral Pulses, No clubbing, cyanosis, or edema  PSYCH/Neuro: AAOx3. Non-focal.   SKIN: No rashes or lesions      LABS:                      RADIOLOGY & ADDITIONAL TESTS:    Imaging Personally Reviewed:  Consultant(s) Notes Reviewed:  pulm  Care Discussed with Consultants/Other Providers: pulm

## 2025-04-24 NOTE — DISCHARGE NOTE NURSING/CASE MANAGEMENT/SOCIAL WORK - FINANCIAL ASSISTANCE
[facility] provides services at a reduced cost to those who are determined to be eligible through [facility]’s financial assistance program. Information regarding [facility]’s financial assistance program can be found by going to [email or web address] or by calling [phone].

## 2025-04-24 NOTE — PROGRESS NOTE ADULT - SUBJECTIVE AND OBJECTIVE BOX
Neurology Progress Note    S: Patient seen and examined. teto pearson dc planning      Medications: MEDICATIONS  (STANDING):  chlorhexidine 2% Cloths 1 Application(s) Topical <User Schedule>  fluticasone propionate 50 MICROgram(s)/spray Nasal Spray 1 Spray(s) Both Nostrils two times a day  ipratropium    for Nebulization 500 MICROGram(s) Nebulizer every 6 hours  montelukast 10 milliGRAM(s) Oral daily  pantoprazole    Tablet 40 milliGRAM(s) Oral before breakfast  remodulin (treprostinil) SubQ infusion 1 Dose(s) 1 Dose(s) SubCutaneous Continuous Pump  rivaroxaban 15 milliGRAM(s) Oral with dinner    MEDICATIONS  (PRN):  acetaminophen     Tablet .. 1000 milliGRAM(s) Oral every 6 hours PRN Mild Pain (1 - 3)  cetirizine 10 milliGRAM(s) Oral daily PRN Amoxicillin Challenge  diphenhydrAMINE 25 milliGRAM(s) Oral every 4 hours PRN Rash and/or Itching  EPINEPHrine     1 mG/mL Injectable 0.3 milliGRAM(s) IntraMuscular once PRN Amoxicllin Challenge  ketorolac   Injectable 30 milliGRAM(s) IV Push every 8 hours PRN Moderate Pain (4 - 6)  meclizine 12.5 milliGRAM(s) Oral once PRN Dizziness  melatonin 9 milliGRAM(s) Oral at bedtime PRN Insomnia  metoclopramide 10 milliGRAM(s) Oral every 8 hours PRN Migraine  polyethylene glycol 3350 17 Gram(s) Oral daily PRN Constipation       Vitals:  Vital Signs Last 24 Hrs  T(C): 36.9 (24 Apr 2025 11:15), Max: 37.1 (23 Apr 2025 23:43)  T(F): 98.4 (24 Apr 2025 11:15), Max: 98.8 (23 Apr 2025 23:43)  HR: 99 (24 Apr 2025 11:15) (92 - 99)  BP: 106/77 (24 Apr 2025 11:15) (101/71 - 106/77)  BP(mean): --  RR: 18 (24 Apr 2025 11:15) (18 - 18)  SpO2: 100% (24 Apr 2025 11:15) (93% - 100%)    Parameters below as of 24 Apr 2025 11:15  Patient On (Oxygen Delivery Method): nasal cannula  O2 Flow (L/min): 2                General Exam:   General Appearance: Appropriately dressed and in no acute distress       Head: Normocephalic, atraumatic and no dysmorphic features  Ear, Nose, and Throat: Moist mucous membranes  CVS: S1S2+  Resp: No SOB, no wheeze or rhonchi  Abd: soft NTND  Extremities: No edema, no cyanosis  Skin: No bruises, no rashes     Neurological Exam:       level of consciousness: Awake, alert  Orientation: Oriented to person, age, place, and time  Language: Speech is fluent with intact naming, repetition, and ability to follow commands (including simple commands and complex cross commands)  Cortical Signs:  no hemineglect.   Content: Good overall fund of knowledge (aware of current events,and relevant past history)    HINTS was +; improved      Hearing intact and  symmetric to finger rub b/l as above    - Cranial Nerves:   PERRL, VFF, EOMI, facial sensation is intact in the V1-V3 distribution bilaterally; face is symmetric with augusto smile; hearing is intact to finger rub bilaterally and equally; uvula is midline and soft palate rises symmetrically; shoulder shrug intact; tongue protrudes in the midline with intact lateral movements    - Motor Testing:  Bulk: Normal   Tone: Normal  Strength:  There is no pronator drift b/l  There is no leg drift b/l  NO orbiting  intact rapid finger rapping                              Right           Left  Should-Abduct      5                   5   -some pain limitations  Elbow-Flex             5                   5  Elbow-Ext              5                   5                        5                   5    Hip-Flex                 5                   5  Knee-Flex              5                   5  Knee-Ext                5                   5  Dorsiflex                5                   5  Plantarflex             5                   5    - Reflexes:              Right           Left  Triceps                     2+                2+  Biceps                      2+                 2+  Brachioradialis         2+                2+  Patellar                    2+                 2+    requires distraction to elicit all reflexes    - Sensory: Intact throughout to light touch.   - Coordination: Finger-nose-finger intact without dysmetria.   ROmberg negative   - Gait: Normal steps, base, arm swing, and turning.  Able to tandem walk. Able to heel and toe walk.    I personally reviewed the below data/images/labs:         LABS:       LABS:    no new labs             < from: CT Head No Cont (04.07.25 @ 10:22) >    ACC: [id number] EXAM:  CT BRAIN   ORDERED BY:  SERINA BOWEN     PROCEDURE DATE:  04/07/2025          INTERPRETATION:  CLINICAL INFORMATION: headaches    COMPARISON: CT head 10/23/2024    CONTRAST:  IV Contrast: NONE  .    TECHNIQUE:  Serial axial images were obtained from the skull base to the   vertex using multi-slice helical technique. Sagittal and coronal   reformats were obtained.    FINDINGS:    VENTRICLES AND SULCI: Normal in size and configuration.  INTRA-AXIAL: No mass effect, acute hemorrhage, or midline shift.  EXTRA-AXIAL: No mass or fluid collection. Basal cisterns are normal in   appearance.    VISUALIZED SINUSES:  Clear.  TYMPANOMASTOID CAVITIES:  Clear.  VISUALIZED ORBITS: Normal.  CALVARIUM: Intact.    MISCELLANEOUS: None.      IMPRESSION:  No acute intracranial hemorrhage, mass effect, or midline shift.        --- End of Report ---            AAKASH SIMON MD; Attending Radiologist  This document has been electronically signed. Apr 7 2025 11:05AM    < end of copied text >

## 2025-04-24 NOTE — PROGRESS NOTE ADULT - PROVIDER SPECIALTY LIST ADULT
Hospitalist
Hospitalist
Internal Medicine
MICU
Neurology
Plastic Surgery
Pulmonology
Surgery
Surgery
Transplant ID
Heart Failure
MICU
Neurology
Pulmonology
Surgery
Transplant ID
Transplant ID
Transplant Pulmonology
Transplant Pulmonology
Internal Medicine
MICU
Neurology
Neurology
Pulmonology
Surgery
Surgery
Transplant ID
Neurology
Neurology
Transplant ID
Neurology
Internal Medicine
Pulmonology
Internal Medicine

## 2025-04-24 NOTE — PROGRESS NOTE ADULT - REASON FOR ADMISSION
abdominal abscess
pre transplant evaluation
lung transplant eval
Abdominal abscess
pulmonary HTN
abdom abscess
lung txp eval

## 2025-04-24 NOTE — PROGRESS NOTE ADULT - PROBLEM SELECTOR PLAN 5
-On Xarelto.   -OOB to chair/ambulate.   #pedunculated mass on L anterior thigh. -S/p removal by plastics on 4/20, f/u path, f/u outpatient in 1 week with Dr. Ashraf.   -outpt PT.     6. Plan discussed with patient and Dr. Adriana dominguez and CASSIUS Lakhani.  -Stable for DC today with outpt f/u. -35 minutes spent on DC process.

## 2025-04-24 NOTE — PROGRESS NOTE ADULT - ATTENDING SUPERVISION STATEMENT
Resident
Resident
Fellow
Resident
Fellow
Resident
Fellow
Resident
Resident/Student

## 2025-04-24 NOTE — PROGRESS NOTE ADULT - TIME BILLING
Medical management as above, reviewing chart and coordinating care with primary team/staff, as well as reviewing vitals, radiology, medication list, recent labs, and prior records.    Does not include teaching time.
chart and data review, clinical assessment, and coordination of care. This excludes any time spent on separate procedures or teaching.
chart and data review, clinical assessment, and coordination of care. This excludes any time spent on separate procedures or teaching.
Personal review of data, imaging and discussion with medical team.
Personal review of data, imaging and discussion with medical team.
chart and data review, clinical assessment, and coordination of care. This excludes any time spent on separate procedures or teaching.
face-to-face encounter, review of extensive medical records in this and prior charts, laboratory findings, radiographic and microbiology results; documentation as noted above and discussion of diagnostic impressions and plan with the patient and team
review of laboratory data, radiology results, discussion with primary team\patient, and monitoring for potential decompensation. Interventions were performed as documented above
chart and data review, clinical assessment, and coordination of care. This excludes any time spent on separate procedures or teaching.
review of laboratory data, radiology results, discussion with primary team\patient, and monitoring for potential decompensation. Interventions were performed as documented above
chart and data review, clinical assessment, and coordination of care. This excludes any time spent on separate procedures or teaching.
conducting Interview, examination, reviewing vitals, laboratory and radiological images; besides coordinating care with primary team and consultants.
Medical management as above, review of results/records, discussion with patient and primary team, documentation.
review of laboratory data, radiology results, discussion with primary team\patient, and monitoring for potential decompensation. Interventions were performed as documented above.
Medical management as above, review of results/records, discussion with patient and primary team, documentation.
- Review of chart and consultation notes  - Exam and discussion with patient  - Review of laboratory and imaging   - Establishing a care plan for patient  - Communication with other providers
Time-based billing (NON-critical care).     The necessity of the time spent during the encounter on this date of service was due to:     - Ordering, reviewing, and interpreting labs, testing, and imaging.  - Independently obtaining a review of systems and performing a physical exam  - Reviewing prior hospitalization and where necessary, outpatient records.  - Counselling and educating patient and/or family regarding interpretation of aforementioned items and plan of care.

## 2025-04-24 NOTE — DISCHARGE NOTE NURSING/CASE MANAGEMENT/SOCIAL WORK - NSDCFUADDAPPT_GEN_ALL_CORE_FT
APPTS ARE READY TO BE MADE: [X] YES    Best Family or Patient Contact (if needed):    Additional Information about above appointments (if needed):    1: Dr. Yoon for pulmonary   2: Dr. Ash in ID  3:     Other comments or requests:

## 2025-04-24 NOTE — PROGRESS NOTE ADULT - ASSESSMENT
44 year old female with history of chronic hypoxic respiratory failure, Group 1 PAH on treprostinil infusion, HF s/p PPM dual chamber (2016), chronic PE (2017) on DOAC, SVT s/p ablation (5/2020), asthma, and MRSA abdominal wall abscesses (last 2/25). Was previously admitted to [facility] 10/13/24-10/29/24 for pre-transplant expedited workup. Sent to ED 4/2/25 for worsening abdominal abscess.    - On going lung transplant work up  - No contraindications for discharge from a lung transplant standpoint  - Pt aware that she needs to continue to follow up in the outpatient clinic as well as with her primary pulmonologist

## 2025-04-24 NOTE — PROGRESS NOTE ADULT - SUBJECTIVE AND OBJECTIVE BOX
Lung Transplant Progress Note  =====================================================  24 hour events: No complaints this morning    T(C): 36.9 (04-24-25 @ 11:15), Max: 37.1 (04-23-25 @ 23:43)  HR: 99 (04-24-25 @ 11:15) (92 - 99)  BP: 106/77 (04-24-25 @ 11:15) (101/71 - 106/77)  RR: 18 (04-24-25 @ 11:15) (18 - 18)  SpO2: 100% (04-24-25 @ 11:15) (93% - 100%)    PAST MEDICAL & SURGICAL HISTORY:  Anemia      Pulmonary hypertension      Asthma  On home O2 nightly at 2L via NC      PE (pulmonary thromboembolism)  on Xarelto 10 mg daily      Sinusitis      Right heart failure      H/O pulmonary hypertension      Pulmonary embolism      History of tachycardia      On supplemental oxygen by nasal cannula  O2 @ 2L      Pacemaker      Right atrial thrombus      Hypotension      Pacemaker      History of pacemaker  12/19 - [employer] model Ingevity 4469    Home Meds: Home Medications:  acetaminophen 500 mg oral tablet: 2 tab(s) orally every 6 hours As needed Mild Pain (1 - 3) (24 Apr 2025 11:22)  Atrovent 18 mcg/inh inhalation aerosol: 1 inhaled 4 times a day as needed for  shortness of breath and/or wheezing (02 Apr 2025 16:24)  omeprazole 40 mg oral delayed release capsule: 1 cap(s) orally once a day (02 Apr 2025 16:24)  Remodulin 5 mg/mL injectable solution: 1 application injectable once a day via her own SQ PUMP (02 Apr 2025 16:24)  sildenafil 20 mg oral tablet: 1 tab(s) orally every 8 hours (02 Apr 2025 16:24)  Xarelto 15 mg oral tablet: 1 tab(s) orally once a day resume tomorrow 2/6 (02 Apr 2025 16:24)    Allergies: Allergies    vancomycin (Rash)  adhesives (Rash)    Intolerances    REVIEW OF SYSTEMS  [x] A ten-point review of systems was otherwise negative except as noted.  [ ] Due to altered mental status/intubation, subjective information were not able to be obtained from the patient. History was obtained, to the extent possible, from review of the chart and collateral sources of information.      CURRENT MEDICATIONS:   Neurologic Medications  acetaminophen     Tablet .. 1000 milliGRAM(s) Oral every 6 hours PRN Mild Pain (1 - 3)  diphenhydrAMINE 25 milliGRAM(s) Oral every 4 hours PRN Rash and/or Itching  ketorolac   Injectable 30 milliGRAM(s) IV Push every 8 hours PRN Moderate Pain (4 - 6)  meclizine 12.5 milliGRAM(s) Oral once PRN Dizziness  melatonin 9 milliGRAM(s) Oral at bedtime PRN Insomnia    Respiratory Medications  cetirizine 10 milliGRAM(s) Oral daily PRN Amoxicillin Challenge  ipratropium    for Nebulization 500 MICROGram(s) Nebulizer every 6 hours  montelukast 10 milliGRAM(s) Oral daily    Cardiovascular Medications  EPINEPHrine     1 mG/mL Injectable 0.3 milliGRAM(s) IntraMuscular once PRN Amoxicllin Challenge    Gastrointestinal Medications  metoclopramide 10 milliGRAM(s) Oral every 8 hours PRN Migraine  pantoprazole    Tablet 40 milliGRAM(s) Oral before breakfast  polyethylene glycol 3350 17 Gram(s) Oral daily PRN Constipation    Genitourinary Medications    Hematologic/Oncologic Medications  rivaroxaban 15 milliGRAM(s) Oral with dinner    Antimicrobial/Immunologic Medications    Endocrine/Metabolic Medications    Topical/Other Medications  chlorhexidine 2% Cloths 1 Application(s) Topical <User Schedule>  fluticasone propionate 50 MICROgram(s)/spray Nasal Spray 1 Spray(s) Both Nostrils two times a day  remodulin (treprostinil) SubQ infusion 1 Dose(s) 1 Dose(s) SubCutaneous Continuous Pump      INS/OUTS (last 24 hours):  I&O's Summary    23 Apr 2025 07:01  -  24 Apr 2025 07:00  --------------------------------------------------------  IN: 720 mL / OUT: 0 mL / NET: 720 mL      --------------------------------------------------------------------------------------    PHYSICAL EXAM:  GENERAL: NAD  HEAD:  Atraumatic, normocephalic  EYES: EOMI, PERRLA, conjunctiva and sclera clear  NECK: Supple  CHEST/LUNG: CTA B/L  HEART: RRR. +S1/S2  ABDOMEN: Soft, nondistended, nontender  EXTREMITIES: 2+ peripheral pulses  PSYCH: A&O x3  NEUROLOGY: Non-focal, motor & sensory grossly intact  SKIN: Warm & dry, no rashes or lesions    LABS:                CAPILLARY BLOOD GLUCOSE          RADIOLOGY:

## 2025-04-24 NOTE — PROGRESS NOTE ADULT - NS ATTEND AMEND GEN_ALL_CORE FT
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 Admitted for recurrent abdominal wall abscess s/p I&D. Cultures grew MRSA and pseudomonas. Patient completed antibiotic therapy. She underwent additional work up for transplant including MYRIAM, cMRI, MRCP, and CT colonography. Also received a graded amoxicillin challenge on 4/22/25 without incident.    Patient is planned for discharge home today  We discussed importance of losing additional weight with target BMI < 32. Ideally < 30.  Will need close follow up with transplant pulmonary as outpatient as well as with her primary pulmonologist Dr. Yoon

## 2025-04-24 NOTE — PROGRESS NOTE ADULT - PROBLEM SELECTOR PLAN 4
#migraines  - ketorolac (Toradol) 30mg IV q8h PRN (or acetaminophen 1g IV q8h PRN), metoclopramide (Reglan) 10mg q8h PRN  - getting prn po benadryl now. -advised patient to f/u with neuro outpt for further mgmt but for now can switch to oral nsaids in addition to the above.     #peripheral vertigo  -meclizine 12.5 mg PRN  -vestibular rehab outpt.

## 2025-04-25 ENCOUNTER — APPOINTMENT (OUTPATIENT)
Dept: PULMONOLOGY | Facility: CLINIC | Age: 45
End: 2025-04-25
Payer: MEDICAID

## 2025-04-25 ENCOUNTER — NON-APPOINTMENT (OUTPATIENT)
Age: 45
End: 2025-04-25

## 2025-04-25 VITALS
TEMPERATURE: 98 F | HEART RATE: 58 BPM | RESPIRATION RATE: 18 BRPM | OXYGEN SATURATION: 99 % | DIASTOLIC BLOOD PRESSURE: 57 MMHG | SYSTOLIC BLOOD PRESSURE: 153 MMHG

## 2025-04-25 PROBLEM — I95.9 HYPOTENSION, UNSPECIFIED: Chronic | Status: ACTIVE | Noted: 2025-04-08

## 2025-04-25 PROCEDURE — 99496 TRANSJ CARE MGMT HIGH F2F 7D: CPT | Mod: 95

## 2025-04-26 ENCOUNTER — NON-APPOINTMENT (OUTPATIENT)
Age: 45
End: 2025-04-26

## 2025-04-29 ENCOUNTER — NON-APPOINTMENT (OUTPATIENT)
Age: 45
End: 2025-04-29

## 2025-04-29 ENCOUNTER — APPOINTMENT (OUTPATIENT)
Dept: PEDIATRIC ALLERGY IMMUNOLOGY | Facility: CLINIC | Age: 45
End: 2025-04-29

## 2025-04-29 LAB — SURGICAL PATHOLOGY STUDY: SIGNIFICANT CHANGE UP

## 2025-04-30 ENCOUNTER — APPOINTMENT (OUTPATIENT)
Dept: INFECTIOUS DISEASE | Facility: CLINIC | Age: 45
End: 2025-04-30

## 2025-05-01 ENCOUNTER — APPOINTMENT (OUTPATIENT)
Dept: PULMONOLOGY | Facility: CLINIC | Age: 45
End: 2025-05-01

## 2025-05-02 ENCOUNTER — APPOINTMENT (OUTPATIENT)
Dept: PULMONOLOGY | Facility: CLINIC | Age: 45
End: 2025-05-02

## 2025-05-02 ENCOUNTER — LABORATORY RESULT (OUTPATIENT)
Age: 45
End: 2025-05-02

## 2025-05-02 VITALS
BODY MASS INDEX: 34.99 KG/M2 | TEMPERATURE: 98.1 F | DIASTOLIC BLOOD PRESSURE: 83 MMHG | WEIGHT: 210 LBS | HEIGHT: 65 IN | SYSTOLIC BLOOD PRESSURE: 131 MMHG | OXYGEN SATURATION: 92 % | HEART RATE: 133 BPM

## 2025-05-02 DIAGNOSIS — D64.9 ANEMIA, UNSPECIFIED: ICD-10-CM

## 2025-05-02 PROCEDURE — 99214 OFFICE O/P EST MOD 30 MIN: CPT | Mod: 25

## 2025-05-02 PROCEDURE — 96372 THER/PROPH/DIAG INJ SC/IM: CPT

## 2025-05-02 RX ORDER — BENRALIZUMAB 30 MG/ML
30 INJECTION, SOLUTION SUBCUTANEOUS
Refills: 0 | Status: COMPLETED | OUTPATIENT
Start: 2025-05-02

## 2025-05-02 RX ADMIN — BENRALIZUMAB 30 MG/ML: 30 INJECTION, SOLUTION SUBCUTANEOUS at 00:00

## 2025-05-03 ENCOUNTER — NON-APPOINTMENT (OUTPATIENT)
Age: 45
End: 2025-05-03

## 2025-05-04 LAB
ALBUMIN SERPL ELPH-MCNC: 4.3 G/DL
ALP BLD-CCNC: 66 U/L
ALT SERPL-CCNC: 21 U/L
ANION GAP SERPL CALC-SCNC: 15 MMOL/L
AST SERPL-CCNC: 19 U/L
BASOPHILS # BLD AUTO: 0.05 K/UL
BASOPHILS NFR BLD AUTO: 0.9 %
BILIRUB SERPL-MCNC: 0.7 MG/DL
BUN SERPL-MCNC: 5 MG/DL
CALCIUM SERPL-MCNC: 9.2 MG/DL
CHLORIDE SERPL-SCNC: 105 MMOL/L
CO2 SERPL-SCNC: 20 MMOL/L
CREAT SERPL-MCNC: 0.78 MG/DL
EGFRCR SERPLBLD CKD-EPI 2021: 96 ML/MIN/1.73M2
EOSINOPHIL # BLD AUTO: 0.15 K/UL
EOSINOPHIL NFR BLD AUTO: 2.7 %
GLUCOSE SERPL-MCNC: 86 MG/DL
HCT VFR BLD CALC: 39.7 %
HGB BLD-MCNC: 12.2 G/DL
IMM GRANULOCYTES NFR BLD AUTO: 0.2 %
LYMPHOCYTES # BLD AUTO: 1.64 K/UL
LYMPHOCYTES NFR BLD AUTO: 29.3 %
MAN DIFF?: NORMAL
MCHC RBC-ENTMCNC: 22.8 PG
MCHC RBC-ENTMCNC: 30.7 G/DL
MCV RBC AUTO: 74.1 FL
MONOCYTES # BLD AUTO: 0.47 K/UL
MONOCYTES NFR BLD AUTO: 8.4 %
NEUTROPHILS # BLD AUTO: 3.27 K/UL
NEUTROPHILS NFR BLD AUTO: 58.5 %
NT-PROBNP SERPL-MCNC: 59 PG/ML
PLATELET # BLD AUTO: 398 K/UL
POTASSIUM SERPL-SCNC: 3.8 MMOL/L
PROT SERPL-MCNC: 7.2 G/DL
RBC # BLD: 5.36 M/UL
RBC # FLD: 21.7 %
SODIUM SERPL-SCNC: 140 MMOL/L
WBC # FLD AUTO: 5.59 K/UL

## 2025-05-06 ENCOUNTER — NON-APPOINTMENT (OUTPATIENT)
Age: 45
End: 2025-05-06

## 2025-05-07 RX ORDER — TREPROSTINIL 0.12 MG/1
0.12 TABLET, EXTENDED RELEASE ORAL
Qty: 180 | Refills: 2 | Status: ACTIVE | COMMUNITY
Start: 2025-05-02

## 2025-05-07 RX ORDER — TREPROSTINIL 0.25 MG/1
0.25 TABLET, EXTENDED RELEASE ORAL
Qty: 270 | Refills: 3 | Status: ACTIVE | COMMUNITY
Start: 2025-05-02

## 2025-05-07 RX ORDER — TREPROSTINIL 2.5 MG/1
2.5 TABLET, EXTENDED RELEASE ORAL
Qty: 180 | Refills: 5 | Status: ACTIVE | COMMUNITY
Start: 2025-05-02

## 2025-05-07 RX ORDER — TREPROSTINIL 1 MG/1
1 TABLET, EXTENDED RELEASE ORAL
Qty: 270 | Refills: 3 | Status: ACTIVE | COMMUNITY
Start: 2025-05-02

## 2025-05-08 RX ORDER — SOTATERCEPT-CSRK 90 MG
2 X 45 KIT SUBCUTANEOUS
Qty: 1 | Refills: 11 | Status: ACTIVE | COMMUNITY
Start: 2025-05-08 | End: 1900-01-01

## 2025-05-08 RX ORDER — SOTATERCEPT-CSRK 45 MG
45 KIT SUBCUTANEOUS
Qty: 1 | Refills: 0 | Status: ACTIVE | COMMUNITY
Start: 2025-05-02 | End: 1900-01-01

## 2025-05-09 ENCOUNTER — APPOINTMENT (OUTPATIENT)
Dept: PULMONOLOGY | Facility: CLINIC | Age: 45
End: 2025-05-09

## 2025-05-09 ENCOUNTER — NON-APPOINTMENT (OUTPATIENT)
Age: 45
End: 2025-05-09

## 2025-05-13 ENCOUNTER — APPOINTMENT (OUTPATIENT)
Dept: INTERNAL MEDICINE | Facility: CLINIC | Age: 45
End: 2025-05-13

## 2025-05-13 ENCOUNTER — OUTPATIENT (OUTPATIENT)
Dept: OUTPATIENT SERVICES | Facility: HOSPITAL | Age: 45
LOS: 1 days | End: 2025-05-13

## 2025-05-13 VITALS — BODY MASS INDEX: 34.16 KG/M2 | WEIGHT: 205 LBS | HEIGHT: 65 IN

## 2025-05-13 DIAGNOSIS — E66.01 OBESITY, CLASS 2: ICD-10-CM

## 2025-05-13 DIAGNOSIS — I10 ESSENTIAL (PRIMARY) HYPERTENSION: ICD-10-CM

## 2025-05-13 DIAGNOSIS — E66.812 OBESITY, CLASS 2: ICD-10-CM

## 2025-05-13 DIAGNOSIS — J45.909 UNSPECIFIED ASTHMA, UNCOMPLICATED: ICD-10-CM

## 2025-05-13 DIAGNOSIS — J84.9 INTERSTITIAL PULMONARY DISEASE, UNSPECIFIED: ICD-10-CM

## 2025-05-13 DIAGNOSIS — I50.810 RIGHT HEART FAILURE, UNSPECIFIED: ICD-10-CM

## 2025-05-13 PROCEDURE — 99213 OFFICE O/P EST LOW 20 MIN: CPT | Mod: GC

## 2025-05-13 PROCEDURE — G2211 COMPLEX E/M VISIT ADD ON: CPT | Mod: NC

## 2025-05-15 DIAGNOSIS — E66.813 OBESITY, CLASS 3: ICD-10-CM

## 2025-05-20 DIAGNOSIS — I27.20 PULMONARY HYPERTENSION, UNSPECIFIED: ICD-10-CM

## 2025-05-27 ENCOUNTER — APPOINTMENT (OUTPATIENT)
Dept: PULMONOLOGY | Facility: CLINIC | Age: 45
End: 2025-05-27

## 2025-05-27 PROCEDURE — 99214 OFFICE O/P EST MOD 30 MIN: CPT | Mod: 95

## 2025-05-28 ENCOUNTER — NON-APPOINTMENT (OUTPATIENT)
Age: 45
End: 2025-05-28

## 2025-05-28 DIAGNOSIS — E61.1 IRON DEFICIENCY: ICD-10-CM

## 2025-05-28 RX ORDER — AZITHROMYCIN 250 MG/1
250 TABLET, FILM COATED ORAL
Qty: 1 | Refills: 0 | Status: ACTIVE | COMMUNITY
Start: 2025-05-27 | End: 1900-01-01

## 2025-05-30 ENCOUNTER — INPATIENT (INPATIENT)
Facility: HOSPITAL | Age: 45
LOS: 11 days | Discharge: HOME HEALTH | DRG: 982 | End: 2025-06-11
Attending: STUDENT IN AN ORGANIZED HEALTH CARE EDUCATION/TRAINING PROGRAM | Admitting: HOSPITALIST
Payer: MEDICAID

## 2025-05-30 ENCOUNTER — APPOINTMENT (OUTPATIENT)
Dept: PULMONOLOGY | Facility: CLINIC | Age: 45
End: 2025-05-30
Payer: MEDICAID

## 2025-05-30 VITALS
HEART RATE: 117 BPM | WEIGHT: 205.91 LBS | RESPIRATION RATE: 19 BRPM | HEIGHT: 65 IN | OXYGEN SATURATION: 93 % | TEMPERATURE: 98 F | DIASTOLIC BLOOD PRESSURE: 74 MMHG | SYSTOLIC BLOOD PRESSURE: 119 MMHG

## 2025-05-30 VITALS
OXYGEN SATURATION: 95 % | TEMPERATURE: 98 F | DIASTOLIC BLOOD PRESSURE: 86 MMHG | RESPIRATION RATE: 16 BRPM | HEIGHT: 65 IN | WEIGHT: 206 LBS | HEART RATE: 109 BPM | BODY MASS INDEX: 34.32 KG/M2 | SYSTOLIC BLOOD PRESSURE: 136 MMHG

## 2025-05-30 DIAGNOSIS — Z95.0 PRESENCE OF CARDIAC PACEMAKER: Chronic | ICD-10-CM

## 2025-05-30 PROCEDURE — 99215 OFFICE O/P EST HI 40 MIN: CPT

## 2025-05-30 PROCEDURE — 10061 I&D ABSCESS COMP/MULTIPLE: CPT

## 2025-05-30 PROCEDURE — 99285 EMERGENCY DEPT VISIT HI MDM: CPT | Mod: 25

## 2025-05-30 RX ORDER — DOXYCYCLINE HYCLATE 100 MG
100 TABLET ORAL EVERY 12 HOURS
Refills: 0 | Status: DISCONTINUED | OUTPATIENT
Start: 2025-05-31 | End: 2025-05-31

## 2025-05-30 RX ORDER — DOXYCYCLINE HYCLATE 100 MG
TABLET ORAL
Refills: 0 | Status: DISCONTINUED | OUTPATIENT
Start: 2025-05-31 | End: 2025-05-31

## 2025-05-30 RX ORDER — DOXYCYCLINE HYCLATE 100 MG
100 TABLET ORAL ONCE
Refills: 0 | Status: COMPLETED | OUTPATIENT
Start: 2025-05-30 | End: 2025-05-31

## 2025-05-30 RX ADMIN — Medication 500 MICROGRAM(S): at 23:00

## 2025-05-30 NOTE — ED ADULT TRIAGE NOTE - BP NONINVASIVE SYSTOLIC (MM HG)
Keep the wound dry for 24 hours. After that, you may shower or bathe as normal with gentle soap, but do not scrub the wound or submerge in water. You may apply topical OTC antibiotic ointment and a bandaid to the wound. You may use Ibuprofen and Tylenol for pain. Your sutures will absorb/fall out and do not need to be removed. If they are not gone in the next 6-7 days, follow up with pediatrician. Return to ER if signs of infection occur such as redness, swelling or drainage. 119

## 2025-05-30 NOTE — ED ADULT NURSE NOTE - OBJECTIVE STATEMENT
45 y/o F , AXOX4, with a PMH of pulmonary HTN, presents to the ED reporting L deltoid abscess x 1 week, Pt send by PCP for drainage. Pt on 2L NC due to dyspnea. Pt currently dx with a cold. Pt denies chest pain, dyspnea, N/V, dizziness, headache. Pt breathing unlabored on room air, speaking in complete sentences, strong and equal strength in all extremities, sensations intact, abd soft non tender non distended, no edema noted. Safety and comfort measures maintained.

## 2025-05-31 DIAGNOSIS — L02.91 CUTANEOUS ABSCESS, UNSPECIFIED: ICD-10-CM

## 2025-05-31 DIAGNOSIS — J45.909 UNSPECIFIED ASTHMA, UNCOMPLICATED: ICD-10-CM

## 2025-05-31 DIAGNOSIS — L02.414 CUTANEOUS ABSCESS OF LEFT UPPER LIMB: ICD-10-CM

## 2025-05-31 DIAGNOSIS — Z29.9 ENCOUNTER FOR PROPHYLACTIC MEASURES, UNSPECIFIED: ICD-10-CM

## 2025-05-31 DIAGNOSIS — I27.20 PULMONARY HYPERTENSION, UNSPECIFIED: ICD-10-CM

## 2025-05-31 LAB
ACANTHOCYTES BLD QL SMEAR: SLIGHT — SIGNIFICANT CHANGE UP
ALBUMIN SERPL ELPH-MCNC: 4 G/DL — SIGNIFICANT CHANGE UP (ref 3.3–5)
ALP SERPL-CCNC: 63 U/L — SIGNIFICANT CHANGE UP (ref 40–120)
ALT FLD-CCNC: 11 U/L — SIGNIFICANT CHANGE UP (ref 10–45)
ANION GAP SERPL CALC-SCNC: 14 MMOL/L — SIGNIFICANT CHANGE UP (ref 5–17)
ANISOCYTOSIS BLD QL: SLIGHT — SIGNIFICANT CHANGE UP
APTT BLD: 32.5 SEC — SIGNIFICANT CHANGE UP (ref 26.1–36.8)
AST SERPL-CCNC: 14 U/L — SIGNIFICANT CHANGE UP (ref 10–40)
BASOPHILS # BLD AUTO: 0 K/UL — SIGNIFICANT CHANGE UP (ref 0–0.2)
BASOPHILS NFR BLD AUTO: 0 % — SIGNIFICANT CHANGE UP (ref 0–2)
BILIRUB SERPL-MCNC: 0.4 MG/DL — SIGNIFICANT CHANGE UP (ref 0.2–1.2)
BUN SERPL-MCNC: 14 MG/DL — SIGNIFICANT CHANGE UP (ref 7–23)
CALCIUM SERPL-MCNC: 8.7 MG/DL — SIGNIFICANT CHANGE UP (ref 8.4–10.5)
CHLORIDE SERPL-SCNC: 103 MMOL/L — SIGNIFICANT CHANGE UP (ref 96–108)
CO2 SERPL-SCNC: 22 MMOL/L — SIGNIFICANT CHANGE UP (ref 22–31)
CREAT SERPL-MCNC: 0.97 MG/DL — SIGNIFICANT CHANGE UP (ref 0.5–1.3)
EGFR: 74 ML/MIN/1.73M2 — SIGNIFICANT CHANGE UP
EGFR: 74 ML/MIN/1.73M2 — SIGNIFICANT CHANGE UP
ELLIPTOCYTES BLD QL SMEAR: SIGNIFICANT CHANGE UP
EOSINOPHIL # BLD AUTO: 0 K/UL — SIGNIFICANT CHANGE UP (ref 0–0.5)
EOSINOPHIL NFR BLD AUTO: 0 % — SIGNIFICANT CHANGE UP (ref 0–6)
FLUAV AG NPH QL: SIGNIFICANT CHANGE UP
FLUBV AG NPH QL: SIGNIFICANT CHANGE UP
GAS PNL BLDV: SIGNIFICANT CHANGE UP
GLUCOSE SERPL-MCNC: 111 MG/DL — HIGH (ref 70–99)
HCT VFR BLD CALC: 41.1 % — SIGNIFICANT CHANGE UP (ref 34.5–45)
HGB BLD-MCNC: 13 G/DL — SIGNIFICANT CHANGE UP (ref 11.5–15.5)
HMPV RNA SPEC QL NAA+PROBE: DETECTED
HYPOCHROMIA BLD QL: SLIGHT — SIGNIFICANT CHANGE UP
INR BLD: 1.04 RATIO — SIGNIFICANT CHANGE UP (ref 0.85–1.16)
LYMPHOCYTES # BLD AUTO: 1.3 K/UL — SIGNIFICANT CHANGE UP (ref 1–3.3)
LYMPHOCYTES # BLD AUTO: 11.4 % — LOW (ref 13–44)
MANUAL SMEAR VERIFICATION: SIGNIFICANT CHANGE UP
MCHC RBC-ENTMCNC: 23.2 PG — LOW (ref 27–34)
MCHC RBC-ENTMCNC: 31.6 G/DL — LOW (ref 32–36)
MCV RBC AUTO: 73.4 FL — LOW (ref 80–100)
MICROCYTES BLD QL: SLIGHT — SIGNIFICANT CHANGE UP
MONOCYTES # BLD AUTO: 0.5 K/UL — SIGNIFICANT CHANGE UP (ref 0–0.9)
MONOCYTES NFR BLD AUTO: 4.4 % — SIGNIFICANT CHANGE UP (ref 2–14)
NEUTROPHILS # BLD AUTO: 9.6 K/UL — HIGH (ref 1.8–7.4)
NEUTROPHILS NFR BLD AUTO: 84.2 % — HIGH (ref 43–77)
PLAT MORPH BLD: NORMAL — SIGNIFICANT CHANGE UP
PLATELET # BLD AUTO: 359 K/UL — SIGNIFICANT CHANGE UP (ref 150–400)
POIKILOCYTOSIS BLD QL AUTO: SIGNIFICANT CHANGE UP
POTASSIUM SERPL-MCNC: 3.9 MMOL/L — SIGNIFICANT CHANGE UP (ref 3.5–5.3)
POTASSIUM SERPL-SCNC: 3.9 MMOL/L — SIGNIFICANT CHANGE UP (ref 3.5–5.3)
PROT SERPL-MCNC: 7 G/DL — SIGNIFICANT CHANGE UP (ref 6–8.3)
PROTHROM AB SERPL-ACNC: 11.8 SEC — SIGNIFICANT CHANGE UP (ref 9.9–13.4)
RAPID RVP RESULT: DETECTED
RBC # BLD: 5.6 M/UL — HIGH (ref 3.8–5.2)
RBC # FLD: 18.4 % — HIGH (ref 10.3–14.5)
RBC BLD AUTO: ABNORMAL
RSV RNA NPH QL NAA+NON-PROBE: SIGNIFICANT CHANGE UP
SARS-COV-2 RNA SPEC QL NAA+PROBE: SIGNIFICANT CHANGE UP
SARS-COV-2 RNA SPEC QL NAA+PROBE: SIGNIFICANT CHANGE UP
SCHISTOCYTES BLD QL AUTO: SLIGHT — SIGNIFICANT CHANGE UP
SODIUM SERPL-SCNC: 139 MMOL/L — SIGNIFICANT CHANGE UP (ref 135–145)
SOURCE RESPIRATORY: SIGNIFICANT CHANGE UP
WBC # BLD: 11.4 K/UL — HIGH (ref 3.8–10.5)
WBC # FLD AUTO: 11.4 K/UL — HIGH (ref 3.8–10.5)

## 2025-05-31 PROCEDURE — 93010 ELECTROCARDIOGRAM REPORT: CPT

## 2025-05-31 PROCEDURE — 99223 1ST HOSP IP/OBS HIGH 75: CPT

## 2025-05-31 RX ORDER — DAPTOMYCIN 500 MG/10ML
600 INJECTION, POWDER, LYOPHILIZED, FOR SOLUTION INTRAVENOUS EVERY 24 HOURS
Refills: 0 | Status: DISCONTINUED | OUTPATIENT
Start: 2025-06-01 | End: 2025-06-03

## 2025-05-31 RX ORDER — DAPTOMYCIN 500 MG/10ML
600 INJECTION, POWDER, LYOPHILIZED, FOR SOLUTION INTRAVENOUS ONCE
Refills: 0 | Status: COMPLETED | OUTPATIENT
Start: 2025-05-31 | End: 2025-05-31

## 2025-05-31 RX ORDER — HEPARIN SODIUM 1000 [USP'U]/ML
5000 INJECTION INTRAVENOUS; SUBCUTANEOUS EVERY 8 HOURS
Refills: 0 | Status: DISCONTINUED | OUTPATIENT
Start: 2025-05-31 | End: 2025-06-03

## 2025-05-31 RX ORDER — HYDROMORPHONE/SOD CHLOR,ISO/PF 2 MG/10 ML
1 SYRINGE (ML) INJECTION ONCE
Refills: 0 | Status: DISCONTINUED | OUTPATIENT
Start: 2025-05-31 | End: 2025-05-31

## 2025-05-31 RX ORDER — HYDROMORPHONE/SOD CHLOR,ISO/PF 2 MG/10 ML
0.2 SYRINGE (ML) INJECTION ONCE
Refills: 0 | Status: DISCONTINUED | OUTPATIENT
Start: 2025-05-31 | End: 2025-05-31

## 2025-05-31 RX ORDER — ACETAMINOPHEN 500 MG/5ML
1000 LIQUID (ML) ORAL ONCE
Refills: 0 | Status: COMPLETED | OUTPATIENT
Start: 2025-05-31 | End: 2025-05-31

## 2025-05-31 RX ORDER — PREDNISONE 20 MG/1
15 TABLET ORAL DAILY
Refills: 0 | Status: COMPLETED | OUTPATIENT
Start: 2025-05-31 | End: 2025-05-31

## 2025-05-31 RX ORDER — ACETAMINOPHEN 500 MG/5ML
650 LIQUID (ML) ORAL ONCE
Refills: 0 | Status: COMPLETED | OUTPATIENT
Start: 2025-05-31 | End: 2025-05-31

## 2025-05-31 RX ORDER — AZITHROMYCIN 250 MG
250 CAPSULE ORAL DAILY
Refills: 0 | Status: COMPLETED | OUTPATIENT
Start: 2025-05-31 | End: 2025-06-02

## 2025-05-31 RX ORDER — IPRATROPIUM BROMIDE AND ALBUTEROL SULFATE .5; 2.5 MG/3ML; MG/3ML
3 SOLUTION RESPIRATORY (INHALATION) EVERY 6 HOURS
Refills: 0 | Status: DISCONTINUED | OUTPATIENT
Start: 2025-05-31 | End: 2025-05-31

## 2025-05-31 RX ORDER — PREDNISONE 20 MG/1
10 TABLET ORAL DAILY
Refills: 0 | Status: COMPLETED | OUTPATIENT
Start: 2025-06-01 | End: 2025-06-03

## 2025-05-31 RX ORDER — OXYCODONE HYDROCHLORIDE 30 MG/1
5 TABLET ORAL ONCE
Refills: 0 | Status: DISCONTINUED | OUTPATIENT
Start: 2025-05-31 | End: 2025-05-31

## 2025-05-31 RX ORDER — PREDNISONE 20 MG/1
5 TABLET ORAL DAILY
Refills: 0 | Status: CANCELLED | OUTPATIENT
Start: 2025-06-04 | End: 2025-06-03

## 2025-05-31 RX ORDER — DAPTOMYCIN 500 MG/10ML
INJECTION, POWDER, LYOPHILIZED, FOR SOLUTION INTRAVENOUS
Refills: 0 | Status: DISCONTINUED | OUTPATIENT
Start: 2025-05-31 | End: 2025-06-03

## 2025-05-31 RX ADMIN — Medication 0.2 MILLIGRAM(S): at 16:14

## 2025-05-31 RX ADMIN — Medication 40 MILLIGRAM(S): at 12:19

## 2025-05-31 RX ADMIN — Medication 650 MILLIGRAM(S): at 01:37

## 2025-05-31 RX ADMIN — DAPTOMYCIN 124 MILLIGRAM(S): 500 INJECTION, POWDER, LYOPHILIZED, FOR SOLUTION INTRAVENOUS at 15:46

## 2025-05-31 RX ADMIN — Medication 100 MILLIGRAM(S): at 09:54

## 2025-05-31 RX ADMIN — Medication 250 MILLIGRAM(S): at 18:34

## 2025-05-31 RX ADMIN — Medication 1 MILLIGRAM(S): at 13:05

## 2025-05-31 RX ADMIN — Medication 400 MILLIGRAM(S): at 06:49

## 2025-05-31 RX ADMIN — Medication 100 MILLIGRAM(S): at 00:18

## 2025-05-31 RX ADMIN — Medication 500 MICROGRAM(S): at 18:34

## 2025-05-31 RX ADMIN — PREDNISONE 15 MILLIGRAM(S): 20 TABLET ORAL at 12:18

## 2025-05-31 RX ADMIN — Medication 500 MICROGRAM(S): at 12:22

## 2025-05-31 RX ADMIN — OXYCODONE HYDROCHLORIDE 5 MILLIGRAM(S): 30 TABLET ORAL at 01:36

## 2025-05-31 RX ADMIN — Medication 0.2 MILLIGRAM(S): at 18:20

## 2025-05-31 NOTE — H&P ADULT - ASSESSMENT
45 y/o female, PMH pHTN (on treprostinil SQ pump) currently on lung transplant list, HF (s/p PPM dual chamber 2016), chronic PE (dx 2017) no longer on DOAC, SVT (s/p ablation 05/2020), asthma, with recurrent abscesses (abdominal wall, UE, most recently MRSA) p/w upper extremity abscess

## 2025-05-31 NOTE — ED PROVIDER NOTE - ATTENDING CONTRIBUTION TO CARE
Hx: pt with severe pulmonary hypertension on Ritoxin infusion, recent admission for MRSA abscess, prior bacteremia with Staph Lugdunensis in 2022 presenting with one week of abscess of LEFT upper arm at site of previous Ritoxin infusion, on Azithro for cold symptoms withotu relief of abscess, here for eval.  No reported fever.      PE: well appaering, nontoxic, mild tachypnea, slight wheezing, tachycardic, ab soft, nt.  LUE: tricep area with 3-4cm indurated and fluctuant area with discolored/darkened skin overlying c/w abscess with overlying skin changes.      MDM: recurrent abscess, highly suspicious for MRSA abscess, at risk for bacteremia, at risk for serious infection, needs I&D, check cbc r/o anemia or leukocytosis, check bmp to r/o metabolic derangement and lyte imbalance, lactate r/o sepsis, start IV doxycycline, would consider inpatient given pt's high risk for serious infection and requiring IV antibiotics.

## 2025-05-31 NOTE — PATIENT PROFILE ADULT - FUNCTIONAL ASSESSMENT - BASIC MOBILITY 3.
Patient Education     Kidney Stone (with Pain)    The sharp cramping pain on either side of your lower back and nausea/vomiting that you have are because of a small stone that has formed in the kidney. It is now passing down a narrow tube (ureter) on its way to your bladder. Once the stone reaches your bladder, the pain will often stop. But it may come back as the stone continues to pass out of the bladder and through the urethra. The stone may pass in your urine stream in one piece. The size may be 1/16 inch to 1/4 inch (1 to 6 mm). Or, the stone may break up into tavia fragments that you may not even notice.  Once you have had a kidney stone, you are at risk of getting another one in the future. There are 4 types of kidney stones. Eighty percent are calcium stones--mostly calcium oxalate but also some with calcium phosphate. The other 3 types include uric acid stones, struvite stones (from a preceding infection), and rarely, cystine stones.  Most stones will pass on their own, but may take from a few hours to a few days. Sometimes the stone is too large to pass by itself. In that case, the health care provider will need to use other ways to remove the stone. These techniques include:  · Lithotripsy. This uses ultrasound waves to break up the stone.  · Ureteroscopy. This pushes a basket-like instrument through the urethra and bladder and into the ureter to pull out the stone.  · Various types of direct surgery through the skin  Home care  The following are general care guidelines:  · Drink plenty of fluids. This means at least 12, 8-ounce glasses of fluid--mostly water--a day.  · Each time you urinate, do so in a jar. Pour the urine from the jar through the strainer and into the toilet. Continue doing this until 24 hours after your pain stops. By then, if there was a kidney stone, it should pass from your bladder. Some stones dissolve into sand-like particles and pass right through the strainer. In that case, you  won’t ever see a stone.  · Save any stone that you find in the strainer and bring it to your doctor to look at. It may be possible to stop certain types of stones from forming. For this reason, it is important to know what kind of stone you have.  · Try to stay as active as possible. This will help the stone pass. Don't stay in bed unless your pain keeps you from getting up. You may notice a red, pink, or brown color to your urine. This is normal while passing a kidney stone.  · If you develop pain, you may take ibuprofen or naproxen for pain, unless another medicine was prescribed. If you have chronic liver or kidney disease, talk with your health care provider before taking these medicines. Also talk with your provider if you've had a stomach ulcer or GI bleeding.  Preventing stones  Each year for the next 5 to 7 years, you are at risk that a new stone will form. Your risk is a 50% chance over this time period. The risk is higher if you have a family history of kidney stones or have certain chronic illnesses like hypertension, obesity, or diabetes. But you can make changes to your lifestyle and diet that can lower your risk for another stone.  Most kidney stones are made of calcium. The following is advice for preventing another calcium stone. If you don’t know the type of stone you have, follow this advice until the cause of your stone is found.  Things that help:  · The most important thing you can do is to drink plenty of fluids each day. See home care above.   · Eat foods that contain phytates. These include wheat, rice, rye, barley, and beans. Phytates are substances that may lower your risk for any type of stone for form.  · Eat more fruits and vegetables. Choose those that are high in potassium.  · Eat foods high in natural citrate like fruit and low-sugar fruit juices.  · Having too little calcium in your diet can put you at risk for calcium kidney stones. Eat a normal amount of calcium in your diet and  talk with your health care provider if you are taking calcium supplements. Cutting back on your calcium intake may raise your risk. New research shows that eating calcium-rich and oxalate-rich foods together lowers your risk for stones by binding the minerals in the stomach and intestines before they can reach the kidneys.    · Limit salt intake to 2 grams (1 teaspoon) per day. Use limited amounts when cooking, and don’t add salt at the table. Processed and canned foods are usually high in salt.   · Spinach, rhubarb, peanuts, cashews, almonds, grapefruit, and grapefruit juice are all high oxalate foods. You should limit how much of these you eat. Or eat them with calcium-rich foods. These include dairy products, dark leafy greens, soy products, and calcium-enriched foods.  · Reducing the amount of animal meat and high protein foods in your diet may lower your risk of uric acid stones.  · Avoid excess sugar (sucrose) and fructose (sweetener in many soft drinks) in your diet.   · If you take vitamin C as a supplement, don't take more than 1,000 mg a day.  · A dietitian or your health provider can give you information about changes in your diet that will help stop more kidney stone from forming.  Follow-up care  Follow up with your health care provider, or as advised, if the pain lasts more than 48 hours. Talk with your provider about urine and blood tests to find out the cause of your stone. If you had an X-ray, CT scan, or other diagnostic test, it will be looked at by a specialist. You will be told of any new findings that may affect your care.  When to seek medical advice  Call your health care provider right away if any of these occur:  · Pain that is not controlled by the medicine given  · Repeated vomiting or unable to keep down fluids  · Weakness, dizziness, or fainting  · Fever of 100.4ºF (38ºC) or higher, or as directed by your health care provider  · Passage of solid red or brown urine (can't see through it)  or urine with lots of blood clots  · Foul-smelling or cloudy urine  · Unable to pass urine for 8 hours and increasing bladder pressure  © 7246-6330 [employer] [address] [place] [address], [place] [address]. All rights reserved. This information is not intended as a substitute for professional medical care. Always follow your healthcare professional's instructions.            4 = No assist / stand by assistance

## 2025-05-31 NOTE — CONSULT NOTE ADULT - SUBJECTIVE AND OBJECTIVE BOX
CHIEF COMPLAINT:    HPI:    PAST MEDICAL & SURGICAL HISTORY:  Anemia      Pulmonary hypertension      Asthma  On home O2 nightly at 2L via NC      PE (pulmonary thromboembolism)  on Xarelto 10 mg daily      Sinusitis      Right heart failure      H/O pulmonary hypertension      Pulmonary embolism      History of tachycardia      On supplemental oxygen by nasal cannula  O2 @ 2L      Pacemaker      Right atrial thrombus      Hypotension      Pacemaker      History of pacemaker  12/19 - [employer] model Ingevity 4400          FAMILY HISTORY:  Family history of diabetes mellitus  in brother        SOCIAL HISTORY:  Smoking: __ packs x ___ years  EtOH Use:  Marital Status:  Occupation:  Recent Travel:  Country of Birth:  Advance Directives:    Allergies    vancomycin (Rash)  adhesives (Rash)    Intolerances        HOME MEDICATIONS:    REVIEW OF SYSTEMS:  Constitutional:   Eyes:  ENT:  CV:  Resp:  GI:  :  MSK:  Integumentary:  Neurological:  Psychiatric:  Endocrine:  Hematologic/Lymphatic:  Allergic/Immunologic:  [ ] All other systems negative  [ ] Unable to assess ROS because ________    OBJECTIVE:  ICU Vital Signs Last 24 Hrs  T(C): 36.4 (31 May 2025 04:21), Max: 36.6 (30 May 2025 19:18)  T(F): 97.5 (31 May 2025 04:21), Max: 97.9 (30 May 2025 19:18)  HR: 112 (31 May 2025 04:21) (98 - 117)  BP: 111/86 (31 May 2025 04:21) (111/74 - 119/74)  BP(mean): 86 (31 May 2025 03:45) (86 - 86)  ABP: --  ABP(mean): --  RR: 20 (31 May 2025 04:21) (19 - 20)  SpO2: 97% (31 May 2025 04:21) (93% - 97%)    O2 Parameters below as of 31 May 2025 04:21  Patient On (Oxygen Delivery Method): nasal cannula  O2 Flow (L/min): 3            CAPILLARY BLOOD GLUCOSE          PHYSICAL EXAM:  General:  AAOx3  HEENT: EOMI, PERRL  Lymph Nodes: No LAD  Neck: JVP 4-6cm  Respiratory:   Cardiovascular: RRR, no m/r/g  Abdomen: soft, NT/ND, no HSM  Extremities: no c/c/e  Skin: nl. skin turgor  Neurological: no deficitis      HOSPITAL MEDICATIONS:  MEDICATIONS  (STANDING):  doxycycline IVPB 100 milliGRAM(s) IV Intermittent every 12 hours  doxycycline IVPB        MEDICATIONS  (PRN):      LABS:                        13.0   11.40 )-----------( 359      ( 31 May 2025 00:39 )             41.1     05-31    139  |  103  |  14  ----------------------------<  111[H]  3.9   |  22  |  0.97    Ca    8.7      31 May 2025 00:39    TPro  7.0  /  Alb  4.0  /  TBili  0.4  /  DBili  x   /  AST  14  /  ALT  11  /  AlkPhos  63  05-31    PT/INR - ( 31 May 2025 00:39 )   PT: 11.8 sec;   INR: 1.04 ratio         PTT - ( 31 May 2025 00:39 )  PTT:32.5 sec  Urinalysis Basic - ( 31 May 2025 00:39 )    Color: x / Appearance: x / SG: x / pH: x  Gluc: 111 mg/dL / Ketone: x  / Bili: x / Urobili: x   Blood: x / Protein: x / Nitrite: x   Leuk Esterase: x / RBC: x / WBC x   Sq Epi: x / Non Sq Epi: x / Bacteria: x            MICROBIOLOGY:     RADIOLOGY:  [ ] Reviewed and interpreted by me    PULMONARY FUNCTION TESTS:    EKG: HPI:  43 y/o female, PMH pHTN (on treprostinil SQ pump) currently on lung transplant list, HF (s/p PPM dual chamber 2016), chronic PE (dx 2017) on DOAC, SVT (s/p ablation 05/2020), asthma, h/o MRSA abdominal wall abscesses last 2/25, and JULIA presenting for treatment of new abdominal abscess s/p I&D drainage 4/3 on abx treatment, wound culture +MRSA here with LUE abscess s/p I&D.      PAST MEDICAL & SURGICAL HISTORY:  Anemia      Pulmonary hypertension      Asthma  On home O2 nightly at 2L via NC      PE (pulmonary thromboembolism)  on Xarelto 10 mg daily      Sinusitis      Right heart failure      H/O pulmonary hypertension      Pulmonary embolism      History of tachycardia      On supplemental oxygen by nasal cannula  O2 @ 2L      Pacemaker      Right atrial thrombus      Hypotension      Pacemaker      History of pacemaker  12/19 - [employer] model Ingevity 4413          FAMILY HISTORY:  Family history of diabetes mellitus  in brother        SOCIAL HISTORY:  Smoking: denies  EtOH Use: denies    Allergies    vancomycin (Rash)  adhesives (Rash)    Intolerances        HOME MEDICATIONS:  · 	NIFEdipine 30 mg oral tablet, extended release: Last Dose Taken:  , 1 tab(s) orally once a day at noon  · 	spironolactone 25 mg oral tablet: Last Dose Taken:  , 2 tab(s) orally 2 times a day  · 	bumetanide 1 mg oral tablet: Last Dose Taken:  , 1 tab(s) orally once a day  · 	predniSONE 5 mg oral tablet: Last Dose Taken:  , 1 tab(s) orally once a day  · 	montelukast 10 mg oral tablet: Last Dose Taken:  , 1 tab(s) orally once a day  · 	sildenafil 20 mg oral tablet: Last Dose Taken:  , 1 tab(s) orally every 8 hours  · 	Xarelto 15 mg oral tablet: Last Dose Taken:  , 1 tab(s) orally once a day resume tomorrow 2/6  · 	Atrovent 18 mcg/inh inhalation aerosol: Last Dose Taken:  , 1 inhaled 4 times a day as needed for  shortness of breath and/or wheezing  · 	omeprazole 40 mg oral delayed release capsule: Last Dose Taken:  , 1 cap(s) orally once a day  · 	Remodulin 5 mg/mL injectable solution: Last Dose Taken:  , 1 application injectable once a day via her own SQ PUMP    REVIEW OF SYSTEMS:  Constitutional:   Eyes:  ENT:  CV:  Resp:  GI:  :  MSK: MARTHAE pain  Integumentary:  Neurological:  Psychiatric:  Endocrine:  Hematologic/Lymphatic:  Allergic/Immunologic:  [x] All other systems negative  [ ] Unable to assess ROS because ________    OBJECTIVE:  ICU Vital Signs Last 24 Hrs  T(C): 36.4 (31 May 2025 04:21), Max: 36.6 (30 May 2025 19:18)  T(F): 97.5 (31 May 2025 04:21), Max: 97.9 (30 May 2025 19:18)  HR: 112 (31 May 2025 04:21) (98 - 117)  BP: 111/86 (31 May 2025 04:21) (111/74 - 119/74)  BP(mean): 86 (31 May 2025 03:45) (86 - 86)  ABP: --  ABP(mean): --  RR: 20 (31 May 2025 04:21) (19 - 20)  SpO2: 97% (31 May 2025 04:21) (93% - 97%)    O2 Parameters below as of 31 May 2025 04:21  Patient On (Oxygen Delivery Method): nasal cannula  O2 Flow (L/min): 3            CAPILLARY BLOOD GLUCOSE          PHYSICAL EXAM:  General:  AAOx3  HEENT: EOMI, PERRL  Lymph Nodes: No LAD  Neck: JVP 4-6cm  Respiratory: decreased bs at the bases  Cardiovascular: RRR, no m/r/g  Abdomen: soft, NT/ND, no HSM  Extremities: no c/c/e  Skin: nl. skin turgor, LUE wrapped   Neurological: no deficitis      HOSPITAL MEDICATIONS:  MEDICATIONS  (STANDING):  doxycycline IVPB 100 milliGRAM(s) IV Intermittent every 12 hours  doxycycline IVPB        MEDICATIONS  (PRN):      LABS:                        13.0   11.40 )-----------( 359      ( 31 May 2025 00:39 )             41.1     05-31    139  |  103  |  14  ----------------------------<  111[H]  3.9   |  22  |  0.97    Ca    8.7      31 May 2025 00:39    TPro  7.0  /  Alb  4.0  /  TBili  0.4  /  DBili  x   /  AST  14  /  ALT  11  /  AlkPhos  63  05-31    PT/INR - ( 31 May 2025 00:39 )   PT: 11.8 sec;   INR: 1.04 ratio         PTT - ( 31 May 2025 00:39 )  PTT:32.5 sec  Urinalysis Basic - ( 31 May 2025 00:39 )    Color: x / Appearance: x / SG: x / pH: x  Gluc: 111 mg/dL / Ketone: x  / Bili: x / Urobili: x   Blood: x / Protein: x / Nitrite: x   Leuk Esterase: x / RBC: x / WBC x   Sq Epi: x / Non Sq Epi: x / Bacteria: x            MICROBIOLOGY:     RADIOLOGY:  [ ] Reviewed and interpreted by me    PULMONARY FUNCTION TESTS:    EKG:

## 2025-05-31 NOTE — CONSULT NOTE ADULT - ASSESSMENT
44 year old female, PMH pHTN (on treprostinil SQ pump) currently on lung transplant list, HF (s/p PPM dual chamber 2016), chronic PE (dx 2017) no longer on DOAC, SVT (s/p ablation 05/2020), asthma, with recurrent abscesses (abdominal wall, UE, most recently MRSA) p/w upper extremity abscess.    #Skin Abscess  --While awaiting incision and drainage culture and susceptibility will broaden from doxycycline to daptomycin 600 mg IV every 24 hours.  --Follow up on wound culture    #URI Symptoms  --Will check RVP    #Pre-Lung Transplant Evaluation  COVID19 Nucleocapsid Antibody Negative  COVID19 Rajinder Antibody Positive  HAV IgG Negative  HBVs Ab Negative  HBVsAg Negative  HBVc Ab Negative  HCV Ab Negative  HSV 1 IgG Positive  HSV 2 IgG Negative  EBV IgG Positive  CMV IgG Positive  VZV IgG Positive  Measles IgG Positive  Mumps IgG Positive  Rubella IgG Positive  Quantiferon Gold Negative  HIV Ag/Ab by CMIA Negative  Syphilis Screen Negative  Toxoplasma IgG Negative  Strongyloides Ab Negative  Trypanosoma cruzi Ab Negative  --ID clearance for lung transplant pending resolution of LUE arm abscess    #Encounter to Vaccinate Patient  COVID19: Would benefit from COVID19 [id number] Vaccine Dose  Influenza: needs this next season ,   RSV: Arexvy 3/6/25  Pneumococcal: States she had pneumococcal vaccination ~2022  HAV: Would benefit from Havrix  HBV: Heplisav Dose 1 3/6/25. Dose 2 of Heplisav ordered  MMR: Rubella IgG pending. Mumps and Measles immune  Varicella: Immune will not require further vaccination  Shingles: Will recommend Shingrix  Tdap: Will recommend Tdap    I will continue to follow. Please feel free to contact me with any further questions.    Rogelio Gonsales M.D.  [facility] Division of Infectious Disease  8AM-5PM Monday - Friday: Available on [employer] Teams  After Hours and Holidays (or if no response on [employer] Teams): Please contact the Infectious Diseases Office at [phone]    The above assessment and plan were discussed with Dr Miranda (hospitalist).

## 2025-05-31 NOTE — PATIENT PROFILE ADULT - FALL HARM RISK - HARM RISK INTERVENTIONS

## 2025-05-31 NOTE — H&P ADULT - PROBLEM SELECTOR PLAN 1
Has a history of abscesses notably abdominal (grown MRSA and pseudomonas in the past). Current location LUE.   -s/p I&D, f/u culture  -continue doxycyline for now for c/f MRSA  -ID following appreciate recs Has a history of abscesses notably abdominal (grown MRSA and pseudomonas in the past). Current location LUE.   -s/p I&D, f/u culture  -dapto pending cultures results  -ID following appreciate recs

## 2025-05-31 NOTE — H&P ADULT - HISTORY OF PRESENT ILLNESS
43 y/o female, PMH pHTN (on treprostinil SQ pump) currently on lung transplant list, HF (s/p PPM dual chamber 2016), chronic PE (dx 2017) no longer on DOAC, SVT (s/p ablation 05/2020), asthma, with recurrent abscesses (abdominal wall, UE, most recently MRSA) p/w upper extremity abscess. Patient recently presented to outpatient pulm for sob. Started on prednisone and z pack. At that time also noted to have worsening L upper extremity redness and pain, radiating down the extremity. Denies recent trauma to the area, recent travel, fevers/chills/drainage. Given the presentation, advised to present to the ED.    In the ED afeb hds. Labs notable for wbc 11.4 (84% neutrophils), BMP wnl. s/p I&D in the ER. Started on doxycycline.

## 2025-05-31 NOTE — CONSULT NOTE ADULT - ASSESSMENT
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 Admitted for recurrent abdominal wall abscess s/p I&D. Cultures grew MRSA and pseudomonas. Patient completed antibiotic therapy. She underwent additional work up for transplant including MYRIAM, cMRI, MRCP, and CT colonography. Also received a graded amoxicillin challenge on 4/22/25 without incident.    We discussed importance of losing additional weight with target BMI < 32. Ideally < 30.  Will need close follow up with transplant pulmonary as outpatient as well as with her primary pulmonologist Dr. Yoon.  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 Admitted for recurrent abdominal wall abscess s/p I&D. Cultures grew MRSA and pseudomonas. Patient completed antibiotic therapy. She underwent additional work up for transplant including MYRIAM, cMRI, MRCP, and CT colonography, here now with LUE abscess.    We discussed importance of losing additional weight with target BMI < 32. Ideally < 30 as this is a major barrier to transplant  Patient's candidacy was reviewed yesterday and decision to close lung transplant evaluation was made due to the following criteria:  ?  1. nutritional/metabolic issues  2. active/recurrent infections    Will need close follow up with transplant pulmonary as outpatient as well as with her primary pulmonologist Dr. Yoon.   She will continue to follow up with our team on a monthly basis moving forward with the focus on optimizing the above concerns.    Abscess therapy as per primary team.

## 2025-05-31 NOTE — H&P ADULT - PROBLEM SELECTOR PLAN 3
recently started on pred taper and z pack for exacerbation  -continue azithro  -continue pred taper (15mg 1 day->10mg 3days->5mg 3days)

## 2025-05-31 NOTE — H&P ADULT - NSICDXPASTSURGICALHX_GEN_ALL_CORE_FT
PAST SURGICAL HISTORY:  History of pacemaker 12/19 - [employer] model Ingevity 4469    Pacemaker

## 2025-05-31 NOTE — H&P ADULT - NSICDXPASTMEDICALHX_GEN_ALL_CORE_FT
PAST MEDICAL HISTORY:  Anemia     Asthma On home O2 nightly at 2L via NC    H/O pulmonary hypertension     History of tachycardia     Hypotension     On supplemental oxygen by nasal cannula O2 @ 2L    Pacemaker     PE (pulmonary thromboembolism) on Xarelto 10 mg daily    Pulmonary embolism     Pulmonary hypertension     Right atrial thrombus     Right heart failure     Sinusitis

## 2025-05-31 NOTE — H&P ADULT - PROBLEM SELECTOR PLAN 2
-off of xarelto, nifedipine, diuretics  -continue remodulin  -continue atrovent  -continue NC  -Transplant candidacy reviewed, currently evaluation closed until BMI closer to target in addition to recurrent infections more under control

## 2025-06-01 LAB
ANION GAP SERPL CALC-SCNC: 14 MMOL/L — SIGNIFICANT CHANGE UP (ref 5–17)
BASOPHILS # BLD AUTO: 0.07 K/UL — SIGNIFICANT CHANGE UP (ref 0–0.2)
BASOPHILS NFR BLD AUTO: 0.7 % — SIGNIFICANT CHANGE UP (ref 0–2)
BUN SERPL-MCNC: 10 MG/DL — SIGNIFICANT CHANGE UP (ref 7–23)
CALCIUM SERPL-MCNC: 8.3 MG/DL — LOW (ref 8.4–10.5)
CHLORIDE SERPL-SCNC: 103 MMOL/L — SIGNIFICANT CHANGE UP (ref 96–108)
CK SERPL-CCNC: 40 U/L — SIGNIFICANT CHANGE UP (ref 25–170)
CO2 SERPL-SCNC: 19 MMOL/L — LOW (ref 22–31)
CREAT SERPL-MCNC: 0.74 MG/DL — SIGNIFICANT CHANGE UP (ref 0.5–1.3)
EGFR: 102 ML/MIN/1.73M2 — SIGNIFICANT CHANGE UP
EGFR: 102 ML/MIN/1.73M2 — SIGNIFICANT CHANGE UP
EOSINOPHIL # BLD AUTO: 0 K/UL — SIGNIFICANT CHANGE UP (ref 0–0.5)
EOSINOPHIL NFR BLD AUTO: 0 % — SIGNIFICANT CHANGE UP (ref 0–6)
GLUCOSE SERPL-MCNC: 79 MG/DL — SIGNIFICANT CHANGE UP (ref 70–99)
GRAM STN FLD: ABNORMAL
HCT VFR BLD CALC: 38.8 % — SIGNIFICANT CHANGE UP (ref 34.5–45)
HGB BLD-MCNC: 11.8 G/DL — SIGNIFICANT CHANGE UP (ref 11.5–15.5)
IMM GRANULOCYTES NFR BLD AUTO: 0.2 % — SIGNIFICANT CHANGE UP (ref 0–0.9)
LYMPHOCYTES # BLD AUTO: 2 K/UL — SIGNIFICANT CHANGE UP (ref 1–3.3)
LYMPHOCYTES # BLD AUTO: 20.2 % — SIGNIFICANT CHANGE UP (ref 13–44)
MCHC RBC-ENTMCNC: 22.6 PG — LOW (ref 27–34)
MCHC RBC-ENTMCNC: 30.4 G/DL — LOW (ref 32–36)
MCV RBC AUTO: 74.5 FL — LOW (ref 80–100)
METHOD TYPE: SIGNIFICANT CHANGE UP
MONOCYTES # BLD AUTO: 0.83 K/UL — SIGNIFICANT CHANGE UP (ref 0–0.9)
MONOCYTES NFR BLD AUTO: 8.4 % — SIGNIFICANT CHANGE UP (ref 2–14)
MRSA PCR RESULT.: DETECTED
MRSA SPEC QL CULT: SIGNIFICANT CHANGE UP
NEUTROPHILS # BLD AUTO: 6.98 K/UL — SIGNIFICANT CHANGE UP (ref 1.8–7.4)
NEUTROPHILS NFR BLD AUTO: 70.5 % — SIGNIFICANT CHANGE UP (ref 43–77)
NRBC BLD AUTO-RTO: 0 /100 WBCS — SIGNIFICANT CHANGE UP (ref 0–0)
PLATELET # BLD AUTO: 311 K/UL — SIGNIFICANT CHANGE UP (ref 150–400)
POTASSIUM SERPL-MCNC: 3.7 MMOL/L — SIGNIFICANT CHANGE UP (ref 3.5–5.3)
POTASSIUM SERPL-SCNC: 3.7 MMOL/L — SIGNIFICANT CHANGE UP (ref 3.5–5.3)
RBC # BLD: 5.21 M/UL — HIGH (ref 3.8–5.2)
RBC # FLD: 17.5 % — HIGH (ref 10.3–14.5)
S AUREUS DNA NOSE QL NAA+PROBE: DETECTED
SODIUM SERPL-SCNC: 136 MMOL/L — SIGNIFICANT CHANGE UP (ref 135–145)
SPECIMEN SOURCE: SIGNIFICANT CHANGE UP
WBC # BLD: 9.9 K/UL — SIGNIFICANT CHANGE UP (ref 3.8–10.5)
WBC # FLD AUTO: 9.9 K/UL — SIGNIFICANT CHANGE UP (ref 3.8–10.5)

## 2025-06-01 PROCEDURE — 99233 SBSQ HOSP IP/OBS HIGH 50: CPT

## 2025-06-01 PROCEDURE — 99222 1ST HOSP IP/OBS MODERATE 55: CPT | Mod: GC

## 2025-06-01 RX ORDER — CEFTRIAXONE 500 MG/1
2000 INJECTION, POWDER, FOR SOLUTION INTRAMUSCULAR; INTRAVENOUS EVERY 24 HOURS
Refills: 0 | Status: DISCONTINUED | OUTPATIENT
Start: 2025-06-01 | End: 2025-06-02

## 2025-06-01 RX ORDER — MUPIROCIN CALCIUM 20 MG/G
1 CREAM TOPICAL
Refills: 0 | Status: DISCONTINUED | OUTPATIENT
Start: 2025-06-01 | End: 2025-06-03

## 2025-06-01 RX ADMIN — Medication 1 APPLICATION(S): at 11:50

## 2025-06-01 RX ADMIN — Medication 500 MICROGRAM(S): at 17:45

## 2025-06-01 RX ADMIN — Medication 500 MICROGRAM(S): at 06:37

## 2025-06-01 RX ADMIN — Medication 40 MILLIGRAM(S): at 06:37

## 2025-06-01 RX ADMIN — DAPTOMYCIN 124 MILLIGRAM(S): 500 INJECTION, POWDER, LYOPHILIZED, FOR SOLUTION INTRAVENOUS at 12:29

## 2025-06-01 RX ADMIN — Medication 250 MILLIGRAM(S): at 11:36

## 2025-06-01 RX ADMIN — CEFTRIAXONE 100 MILLIGRAM(S): 500 INJECTION, POWDER, FOR SOLUTION INTRAMUSCULAR; INTRAVENOUS at 11:36

## 2025-06-01 RX ADMIN — Medication 20 MILLIEQUIVALENT(S): at 11:35

## 2025-06-01 RX ADMIN — Medication 500 MICROGRAM(S): at 21:35

## 2025-06-01 RX ADMIN — PREDNISONE 10 MILLIGRAM(S): 20 TABLET ORAL at 06:37

## 2025-06-01 RX ADMIN — Medication 500 MICROGRAM(S): at 11:43

## 2025-06-01 NOTE — PROGRESS NOTE ADULT - SUBJECTIVE AND OBJECTIVE BOX
Follow Up:      Interval History:    REVIEW OF SYSTEMS  [  ] ROS unobtainable because:    [  ] All other systems negative except as noted below    Constitutional:  [ ] fever [ ] chills  [ ] weight loss  [ ] weakness  Skin:  [ ] rash [ ] phlebitis	  Eyes: [ ] icterus [ ] pain  [ ] discharge	  ENMT: [ ] sore throat  [ ] thrush [ ] ulcers [ ] exudates  Respiratory: [ ] dyspnea [ ] hemoptysis [ ] cough [ ] sputum	  Cardiovascular:  [ ] chest pain [ ] palpitations [ ] edema	  Gastrointestinal:  [ ] nausea [ ] vomiting [ ] diarrhea [ ] constipation [ ] pain	  Genitourinary:  [ ] dysuria [ ] frequency [ ] hematuria [ ] discharge [ ] flank pain  [ ] incontinence  Musculoskeletal:  [ ] myalgias [ ] arthralgias [ ] arthritis  [ ] back pain  Neurological:  [ ] headache [ ] seizures  [ ] confusion/altered mental status    Allergies  vancomycin (Rash)  adhesives (Rash)        ANTIMICROBIALS:  azithromycin   Tablet 250 daily  cefTRIAXone   IVPB 2000 every 24 hours  DAPTOmycin IVPB    DAPTOmycin IVPB 600 every 24 hours      OTHER MEDS:  MEDICATIONS  (STANDING):  heparin   Injectable 5000 every 8 hours  ipratropium    for Nebulization 500 every 6 hours  oxycodone    5 mG/acetaminophen 325 mG 2 every 6 hours PRN  pantoprazole    Tablet 40 before breakfast  predniSONE   Tablet 10 daily      Vital Signs Last 24 Hrs  T(C): 36.4 (01 Jun 2025 13:24), Max: 36.6 (31 May 2025 16:13)  T(F): 97.6 (01 Jun 2025 13:24), Max: 97.8 (31 May 2025 16:13)  HR: 108 (01 Jun 2025 13:24) (89 - 108)  BP: 117/78 (01 Jun 2025 13:24) (102/69 - 128/81)  BP(mean): --  RR: 18 (01 Jun 2025 13:24) (18 - 19)  SpO2: 94% (01 Jun 2025 13:24) (93% - 98%)    Parameters below as of 01 Jun 2025 13:24  Patient On (Oxygen Delivery Method): nasal cannula  O2 Flow (L/min): 3      PHYSICAL EXAMINATION:  General: Alert and Awake, NAD  HEENT: PERRL, EOMI  Neck: Supple  Cardiac: RRR, No M/R/G  Resp: CTAB, No Wh/Rh/Ra  Abdomen: NBS, NT/ND, No HSM, No rigidity or guarding  MSK: No LE edema. No Calf tenderness  : No lucas  Skin: No rashes or lesions. Skin is warm and dry to the touch.   Neuro: Alert and Awake. CN 2-12 Grossly intact. Moves all four extremities spontaneously.  Psych: Calm, Pleasant, Cooperative                          11.8   9.90  )-----------( 311      ( 01 Jun 2025 06:14 )             38.8       06-01    136  |  103  |  10  ----------------------------<  79  3.7   |  19[L]  |  0.74    Ca    8.3[L]      01 Jun 2025 06:12    TPro  7.0  /  Alb  4.0  /  TBili  0.4  /  DBili  x   /  AST  14  /  ALT  11  /  AlkPhos  63  05-31      Urinalysis Basic - ( 01 Jun 2025 06:12 )    Color: x / Appearance: x / SG: x / pH: x  Gluc: 79 mg/dL / Ketone: x  / Bili: x / Urobili: x   Blood: x / Protein: x / Nitrite: x   Leuk Esterase: x / RBC: x / WBC x   Sq Epi: x / Non Sq Epi: x / Bacteria: x        MICROBIOLOGY:  v  Blood Blood-Peripheral  05-31-25   Growth in anaerobic bottle: Gram Positive Cocci in Clusters  Direct identification is available within approximately 3-5  hours either by Blood Panel Multiplexed PCR or Direct  MALDI-TOF. Details: [email or web address]  --  Blood Culture PCR      Incision Incision  05-30-25   Numerous Staphylococcus aureus  Numerous Klebsiella pneumoniae  --  --        Toxoplasma IgG Screen: <3.00 IU/mL (10-25-24 @ 07:14)  HIV-1 RNA Quantitative, Viral Load Log: NOT DET. lg /mL (10-22-24 @ 17:39)  CMV IgG Antibody: >10.00 U/mL (10-22-24 @ 15:11)    Rapid RVP Result: Detected (05-31 @ 13:07)        RADIOLOGY:    <The imaging below has been reviewed and visualized by me independently. Findings as detailed in report below> Follow Up:  MRSA Bacteremia    Interval History: Admission blood cultures resulted with MRSA.  RVP resulted with human metapneumovirus    REVIEW OF SYSTEMS  [  ] ROS unobtainable because:    [ x ] All other systems negative except as noted below    Constitutional:  [ ] fever [ ] chills  [ ] weight loss  [ ] weakness  Skin:  [ ] rash [ ] phlebitis	  Eyes: [ ] icterus [ ] pain  [ ] discharge	  ENMT: [ ] sore throat  [ ] thrush [ ] ulcers [ ] exudates  Respiratory: [ ] dyspnea [ ] hemoptysis [ ] cough [ ] sputum	  Cardiovascular:  [ ] chest pain [ ] palpitations [ ] edema	  Gastrointestinal:  [ ] nausea [ ] vomiting [ ] diarrhea [ ] constipation [ ] pain	  Genitourinary:  [ ] dysuria [ ] frequency [ ] hematuria [ ] discharge [ ] flank pain  [ ] incontinence  Musculoskeletal:  [ ] myalgias [ ] arthralgias [ ] arthritis  [ ] back pain  Neurological:  [ ] headache [ ] seizures  [ ] confusion/altered mental status    Allergies  vancomycin (Rash)  adhesives (Rash)        ANTIMICROBIALS:  azithromycin   Tablet 250 daily  cefTRIAXone   IVPB 2000 every 24 hours  DAPTOmycin IVPB    DAPTOmycin IVPB 600 every 24 hours      OTHER MEDS:  MEDICATIONS  (STANDING):  heparin   Injectable 5000 every 8 hours  ipratropium    for Nebulization 500 every 6 hours  oxycodone    5 mG/acetaminophen 325 mG 2 every 6 hours PRN  pantoprazole    Tablet 40 before breakfast  predniSONE   Tablet 10 daily      Vital Signs Last 24 Hrs  T(C): 36.4 (01 Jun 2025 13:24), Max: 36.6 (31 May 2025 16:13)  T(F): 97.6 (01 Jun 2025 13:24), Max: 97.8 (31 May 2025 16:13)  HR: 108 (01 Jun 2025 13:24) (89 - 108)  BP: 117/78 (01 Jun 2025 13:24) (102/69 - 128/81)  BP(mean): --  RR: 18 (01 Jun 2025 13:24) (18 - 19)  SpO2: 94% (01 Jun 2025 13:24) (93% - 98%)    Parameters below as of 01 Jun 2025 13:24  Patient On (Oxygen Delivery Method): nasal cannula  O2 Flow (L/min): 3    PHYSICAL EXAMINATION:  General: Alert and Awake, NAD  HEENT: Normocephalic / Atraumatic  Resp: No accessory muscles of respiration utilized  Abdomen: Not distended.  MSK: +abscess with packing at lateral left arm. No LE edema. No stigmata of IE. No evidence of phlebitis. No evidence of synovitis.  : No lucas  Skin: No rashes or lesions.    Neuro: Alert and Awake. CN 2-12 Grossly intact. Moves all four extremities spontaneously.  Psych: Calm, Pleasant, Cooperative                          11.8   9.90  )-----------( 311      ( 01 Jun 2025 06:14 )             38.8       06-01    136  |  103  |  10  ----------------------------<  79  3.7   |  19[L]  |  0.74    Ca    8.3[L]      01 Jun 2025 06:12    TPro  7.0  /  Alb  4.0  /  TBili  0.4  /  DBili  x   /  AST  14  /  ALT  11  /  AlkPhos  63  05-31      Urinalysis Basic - ( 01 Jun 2025 06:12 )    Color: x / Appearance: x / SG: x / pH: x  Gluc: 79 mg/dL / Ketone: x  / Bili: x / Urobili: x   Blood: x / Protein: x / Nitrite: x   Leuk Esterase: x / RBC: x / WBC x   Sq Epi: x / Non Sq Epi: x / Bacteria: x        MICROBIOLOGY:  v  Blood Blood-Peripheral  05-31-25   Growth in anaerobic bottle: Gram Positive Cocci in Clusters  Direct identification is available within approximately 3-5  hours either by Blood Panel Multiplexed PCR or Direct  MALDI-TOF. Details: [email or web address]  --  Blood Culture PCR      Incision Incision  05-30-25   Numerous Staphylococcus aureus  Numerous Klebsiella pneumoniae  --  --        Toxoplasma IgG Screen: <3.00 IU/mL (10-25-24 @ 07:14)  HIV-1 RNA Quantitative, Viral Load Log: NOT DET. lg /mL (10-22-24 @ 17:39)  CMV IgG Antibody: >10.00 U/mL (10-22-24 @ 15:11)    Rapid RVP Result: Detected (05-31 @ 13:07)        RADIOLOGY:    <The imaging below has been reviewed and visualized by me independently. Findings as detailed in report below>    < from: MR MRCP No Cont (04.15.25 @ 16:42) >  IMPRESSION:  Normal pancreas. No main pancreatic duct dilatation.    Decreased right ventral abdominal fluid collection.    Right external iliac and bilateral inguinal lymphadenopathy, similar to   recent prior CT 4/2/2025.    < end of copied text >

## 2025-06-01 NOTE — PROGRESS NOTE ADULT - SUBJECTIVE AND OBJECTIVE BOX
[facility] Division of Hospital Medicine  Tiffanie Rea MD  M-F, 8A-5P: MS Teams  Other Times: HIC Extension / HIC Pager      Patient is a 44y old  Female who presents with a chief complaint of Abscess (31 May 2025 15:32)      SUBJECTIVE / OVERNIGHT EVENTS:  Patient was examined today. She is stating that has some left arm pain which is better after getting pain meds. She denies fever, chills, dizziness, chest pain, nausea, vomiting, abd pain.       ADDITIONAL REVIEW OF SYSTEMS:    MEDICATIONS  (STANDING):  azithromycin   Tablet 250 milliGRAM(s) Oral daily  cefTRIAXone   IVPB 2000 milliGRAM(s) IV Intermittent every 24 hours  chlorhexidine 2% Cloths 1 Application(s) Topical daily  DAPTOmycin IVPB      DAPTOmycin IVPB 600 milliGRAM(s) IV Intermittent every 24 hours  heparin   Injectable 5000 Unit(s) SubCutaneous every 8 hours  ipratropium    for Nebulization 500 MICROGram(s) Nebulizer every 6 hours  pantoprazole    Tablet 40 milliGRAM(s) Oral before breakfast  predniSONE   Tablet 10 milliGRAM(s) Oral daily  Treprostinil Continuous SubQ Infusion 1 Dose(s) 1 Dose(s) SubCutaneous Continuous Pump    MEDICATIONS  (PRN):  oxycodone    5 mG/acetaminophen 325 mG 2 Tablet(s) Oral every 6 hours PRN Moderate Pain (4 - 6)      CAPILLARY BLOOD GLUCOSE          I&O's Summary      Daily     Daily     PHYSICAL EXAM:  Vital Signs Last 24 Hrs  T(C): 36.4 (01 Jun 2025 08:04), Max: 36.6 (31 May 2025 16:13)  T(F): 97.6 (01 Jun 2025 08:04), Max: 97.8 (31 May 2025 16:13)  HR: 96 (01 Jun 2025 08:04) (89 - 100)  BP: 102/71 (01 Jun 2025 08:04) (102/69 - 128/81)  BP(mean): --  RR: 18 (01 Jun 2025 08:04) (18 - 19)  SpO2: 95% (01 Jun 2025 08:04) (93% - 98%)    Parameters below as of 01 Jun 2025 08:04  Patient On (Oxygen Delivery Method): nasal cannula  O2 Flow (L/min): 3    CONSTITUTIONAL: NAD,   EYES: PERRLA; conjunctiva and sclera clear  ENMT: Moist oral mucosa,   RESPIRATORY: Normal respiratory effort; lungs are clear to auscultation bilaterally  CARDIOVASCULAR: Regular rate and rhythm, normal S1 and S2, no murmur/rub/gallop; No lower extremity edema; Peripheral pulses are 2+ bilaterally  ABDOMEN: Nontender to palpation, normoactive bowel sounds,   MUSCULOSKELETAL:  no clubbing or cyanosis of digits; no joint swelling or tenderness to palpation, moving all extremities   PSYCH: A+O to person, place, and time; affect appropriate  NEUROLOGY: CN 2-12 are intact and symmetric; no gross sensory/motor deficits   SKIN: LUE lesion wrapped in ACE badanage    LABS:                        11.8   9.90  )-----------( 311      ( 01 Jun 2025 06:14 )             38.8     06-01    136  |  103  |  10  ----------------------------<  79  3.7   |  19[L]  |  0.74    Ca    8.3[L]      01 Jun 2025 06:12    TPro  7.0  /  Alb  4.0  /  TBili  0.4  /  DBili  x   /  AST  14  /  ALT  11  /  AlkPhos  63  05-31    LIVER FUNCTIONS - ( 31 May 2025 00:39 )  Alb: 4.0 g/dL / Pro: 7.0 g/dL / ALK PHOS: 63 U/L / ALT: 11 U/L / AST: 14 U/L / GGT: x           PT/INR - ( 31 May 2025 00:39 )   PT: 11.8 sec;   INR: 1.04 ratio         PTT - ( 31 May 2025 00:39 )  PTT:32.5 sec      Urinalysis Basic - ( 01 Jun 2025 06:12 )    Color: x / Appearance: x / SG: x / pH: x  Gluc: 79 mg/dL / Ketone: x  / Bili: x / Urobili: x   Blood: x / Protein: x / Nitrite: x   Leuk Esterase: x / RBC: x / WBC x   Sq Epi: x / Non Sq Epi: x / Bacteria: x        Culture - Blood (collected 31 May 2025 00:10)  Source: Blood Blood-Peripheral  Gram Stain (01 Jun 2025 00:53):    Growth in anaerobic bottle: Gram Positive Cocci in Clusters  Preliminary Report (01 Jun 2025 00:53):    Growth in anaerobic bottle: Gram Positive Cocci in Clusters    Direct identification is available within approximately 3-5    hours either by Blood Panel Multiplexed PCR or Direct    MALDI-TOF. Details: [email or web address]  Organism: Blood Culture PCR (01 Jun 2025 03:06)  Organism: Blood Culture PCR (01 Jun 2025 03:06)    Culture - Blood (collected 31 May 2025 00:10)  Source: Blood Blood-Peripheral  Gram Stain (01 Jun 2025 04:45):    Growth in anaerobic bottle: Gram Positive Cocci in Clusters  Preliminary Report (01 Jun 2025 04:45):    Growth in anaerobic bottle: Gram Positive Cocci in Clusters    Culture - Wound Aerobic (collected 30 May 2025 23:49)  Source: Incision Incision  Preliminary Report (01 Jun 2025 10:20):    Numerous Staphylococcus aureus    Numerous Klebsiella pneumoniae      SARS-CoV-2: NotDetec (31 May 2025 13:07)      RADIOLOGY & ADDITIONAL TESTS:  Results Reviewed:   Imaging Personally Reviewed:  Electrocardiogram Personally Reviewed:    COORDINATION OF CARE:  Care Discussed with Consultants/Other Providers [Y/N]:  Prior or Outpatient Records Reviewed [Y/N]:

## 2025-06-01 NOTE — PROGRESS NOTE ADULT - PROBLEM SELECTOR PLAN 1
Has a history of abscesses notably abdominal (grown MRSA and pseudomonas in the past). Current location LUE.   -s/p I&D, f/u culture  >    > 6/1 : blood cx + GPC, MRSA pcr  > added Ceftraixone per ID recs, discussed with ID attending.  repeat blood cx x2 . Check TTE. Check LUE CT with IV contrast.   - on Dapto , Azithro per ID  - F/U ID team for further recs

## 2025-06-01 NOTE — CONSULT NOTE ADULT - SUBJECTIVE AND OBJECTIVE BOX
CHIEF COMPLAINT:    HPI:    44F w/ PMH of pHTN on Remodulin sq, UE DVT on Xarelto, Asthma, CHF  She is also following with transplant pulmonary though currently not a candidate.  Comes to the ED with concern with concern for UE abscesses.   Now being admitted for I&D and likely IV abx.  No fevers, chills, however local tenderness. Of note, she has a history of recurrent skin abscesses in the past.    Pulmonary consulted given patient's longstanding pulmonary hypertension.    Patient afebrile, no leukocytosis, electrolytes, kidney function at baseline.    PAST MEDICAL & SURGICAL HISTORY:  Anemia      Pulmonary hypertension      Asthma  On home O2 nightly at 2L via NC      PE (pulmonary thromboembolism)  on Xarelto 10 mg daily      Sinusitis      Right heart failure      H/O pulmonary hypertension      Pulmonary embolism      History of tachycardia      On supplemental oxygen by nasal cannula  O2 @ 2L      Pacemaker      Right atrial thrombus      Hypotension      Pacemaker      History of pacemaker  12/19 - [employer] model Ingevity 4467          FAMILY HISTORY:  Family history of diabetes mellitus  in brother        SOCIAL HISTORY:  Smoking: [ ] Never Smoked [ ] Former Smoker (__ packs x ___ years) [ ] Current Smoker  (__ packs x ___ years)  Substance Use: [ ] Never Used [ ] Used ____  EtOH Use:  Marital Status: [ ] Single [ ]  [ ]  [ ]   Sexual History:   Occupation:  Recent Travel:  Country of Birth:  Advance Directives:    Allergies    vancomycin (Rash)  adhesives (Rash)    Intolerances        HOME MEDICATIONS:  Home Medications:  acetaminophen 500 mg oral tablet: 2 tab(s) orally every 6 hours As needed Mild Pain (1 - 3) (24 Apr 2025 11:22)  Atrovent 18 mcg/inh inhalation aerosol: 1 inhaled 4 times a day as needed for  shortness of breath and/or wheezing (02 Apr 2025 16:24)  omeprazole 40 mg oral delayed release capsule: 1 cap(s) orally once a day (02 Apr 2025 16:24)  Remodulin 5 mg/mL injectable solution: 1 application injectable once a day via her own SQ PUMP (02 Apr 2025 16:24)      REVIEW OF SYSTEMS:  [ x ] All other systems negative  [ ] Unable to assess ROS because ________    OBJECTIVE:  ICU Vital Signs Last 24 Hrs  T(C): 36.4 (01 Jun 2025 13:24), Max: 36.6 (31 May 2025 16:13)  T(F): 97.6 (01 Jun 2025 13:24), Max: 97.8 (31 May 2025 16:13)  HR: 108 (01 Jun 2025 13:24) (89 - 108)  BP: 117/78 (01 Jun 2025 13:24) (102/71 - 128/81)  BP(mean): --  ABP: --  ABP(mean): --  RR: 18 (01 Jun 2025 13:24) (18 - 18)  SpO2: 94% (01 Jun 2025 13:24) (94% - 98%)    O2 Parameters below as of 01 Jun 2025 13:24  Patient On (Oxygen Delivery Method): nasal cannula  O2 Flow (L/min): 3            06-01 @ 07:01  -  06-01 @ 14:29  --------------------------------------------------------  IN: 480 mL / OUT: 0 mL / NET: 480 mL      CAPILLARY BLOOD GLUCOSE          PHYSICAL EXAM:  General: NAD  HEENT: Normal sclera  Lymph Nodes: No LAD  Respiratory: CTABL  Cardiovascular: Regular rate and rhythm no m/r/g  Abdomen: Nontender nondistended  Extremities: No peripheral edema  Skin: No rashes  Neurological: No appreciable neurologic deficits  Psychiatry: Appropriate mood and affect    LINES:     HOSPITAL MEDICATIONS:  Standing Meds:  azithromycin   Tablet 250 milliGRAM(s) Oral daily  cefTRIAXone   IVPB 2000 milliGRAM(s) IV Intermittent every 24 hours  chlorhexidine 2% Cloths 1 Application(s) Topical daily  DAPTOmycin IVPB      DAPTOmycin IVPB 600 milliGRAM(s) IV Intermittent every 24 hours  heparin   Injectable 5000 Unit(s) SubCutaneous every 8 hours  ipratropium    for Nebulization 500 MICROGram(s) Nebulizer every 6 hours  pantoprazole    Tablet 40 milliGRAM(s) Oral before breakfast  predniSONE   Tablet 10 milliGRAM(s) Oral daily  Treprostinil Continuous SubQ Infusion 1 Dose(s) 1 Dose(s) SubCutaneous Continuous Pump      PRN Meds:  oxycodone    5 mG/acetaminophen 325 mG 2 Tablet(s) Oral every 6 hours PRN      LABS:                        11.8   9.90  )-----------( 311      ( 01 Jun 2025 06:14 )             38.8     Hgb Trend: 11.8<--, 13.0<--  06-01    136  |  103  |  10  ----------------------------<  79  3.7   |  19[L]  |  0.74    Ca    8.3[L]      01 Jun 2025 06:12    TPro  7.0  /  Alb  4.0  /  TBili  0.4  /  DBili  x   /  AST  14  /  ALT  11  /  AlkPhos  63  05-31    Creatinine Trend: 0.74<--, 0.97<--  PT/INR - ( 31 May 2025 00:39 )   PT: 11.8 sec;   INR: 1.04 ratio         PTT - ( 31 May 2025 00:39 )  PTT:32.5 sec  Urinalysis Basic - ( 01 Jun 2025 06:12 )    Color: x / Appearance: x / SG: x / pH: x  Gluc: 79 mg/dL / Ketone: x  / Bili: x / Urobili: x   Blood: x / Protein: x / Nitrite: x   Leuk Esterase: x / RBC: x / WBC x   Sq Epi: x / Non Sq Epi: x / Bacteria: x            MICROBIOLOGY:     Culture - Blood (collected 31 May 2025 00:10)  Source: Blood Blood-Peripheral  Gram Stain (01 Jun 2025 00:53):    Growth in anaerobic bottle: Gram Positive Cocci in Clusters  Preliminary Report (01 Jun 2025 00:53):    Growth in anaerobic bottle: Gram Positive Cocci in Clusters    Direct identification is available within approximately 3-5    hours either by Blood Panel Multiplexed PCR or Direct    MALDI-TOF. Details: [email or web address]  Organism: Blood Culture PCR (01 Jun 2025 03:06)  Organism: Blood Culture PCR (01 Jun 2025 03:06)    Culture - Blood (collected 31 May 2025 00:10)  Source: Blood Blood-Peripheral  Gram Stain (01 Jun 2025 04:45):    Growth in anaerobic bottle: Gram Positive Cocci in Clusters  Preliminary Report (01 Jun 2025 04:45):    Growth in anaerobic bottle: Gram Positive Cocci in Clusters    Culture - Wound Aerobic (collected 30 May 2025 23:49)  Source: Incision Incision  Preliminary Report (01 Jun 2025 10:20):    Numerous Staphylococcus aureus    Numerous Klebsiella pneumoniae        RADIOLOGY:  [ x ] Reviewed and interpreted by me    PULMONARY FUNCTION TESTS:    EKG:

## 2025-06-01 NOTE — CONSULT NOTE ADULT - ATTENDING COMMENTS
45 yo F PMH pHTN on remodulin, DVT on AC, asthma, HF, and prior recurrent abscesses presenting for UE abscess. Pulmonary consulted for pHTN management.    - 45 yo F PMH pHTN on remodulin, DVT on AC, asthma, HF, and prior recurrent abscesses presenting for UE abscess. Pulmonary consulted for pHTN management.    - c/w home redomulin  - pt follows with Dr. Yoon, plan for transition to [facility]  - c/w bronchodilators  - supplemental O2 for goal >92%  - on abx, appreciate transplant ID recs

## 2025-06-01 NOTE — CONSULT NOTE ADULT - TIME BILLING
reviewing chart and coordinating care with primary team/staff, as well as reviewing vitals, radiology, medication list, recent labs, and prior records.
Reviewing the EMR, vitals, imaging, medication list recent labs, prior records, and coordinating care with medical providers. This time excludes procedures and teaching.

## 2025-06-01 NOTE — CONSULT NOTE ADULT - ASSESSMENT
44F w/ PMH of pHTN on Remodulin sq, UE DVT on Xarelto, Asthma, CHF  She is also following with transplant pulmonary though currently not a candidate.  Comes to the ED with concern with concern for UE abscess.  Now being admitted for I&D and likely IV abx.    # pHTN  Patient has a known history of PAH on remodulin. Also one O2 at home, 2-3lpm. Tentative plan to transition from remodulin to winrevair soon per Dr Yoon  Appears stable from baseline. Previously also on Xarelto, Bumex, Aldactone  - continue home Remodulin 51ng/kg/min on home pump  - monitor volume status, might need diuretics  - monitor K, Na, Mg  - in case of major surgery plans, please let pulmonary know, as patient might need adjustment to these medications  - discussed with Dr Yoon    # asthma  Patient with known asthma, On Fasenra. PFTs from 1/2025 w/ severe obstruction, low DLCO.  - continue home equivalent ipratropium, levalbuterol (off budesonide due to facial rash)  - continue flonase  - PRN levalbuterol for wheezing, q6h

## 2025-06-01 NOTE — PROGRESS NOTE ADULT - PROBLEM SELECTOR PLAN 2
-off of xarelto, nifedipine, diuretics  -continue remodulin  -continue atrovent  -continue NC  -Transplant candidacy reviewed, currently evaluation closed until BMI closer to target in addition to recurrent infections more under control  - F/U transplant pulm team for further recs

## 2025-06-01 NOTE — PROVIDER CONTACT NOTE (CRITICAL VALUE NOTIFICATION) - BACKGROUND
Patient dx with Cutaneous abscess of left upper extremity. Hx of severe pulmonary hypertension on Ritoxin infusion
Patient dx with Cutaneous abscess of left upper extremity. Hx of severe pulmonary hypertension on Ritoxin infusion

## 2025-06-01 NOTE — PROVIDER CONTACT NOTE (CRITICAL VALUE NOTIFICATION) - ASSESSMENT
A&Ox4. No complains of pain, discomfort, chest pain, SOB, and palpitations.
A&Ox4. No complains of pain, discomfort, chest pain, SOB, and palpitations.

## 2025-06-02 LAB
-  AMPICILLIN/SULBACTAM: SIGNIFICANT CHANGE UP
-  AMPICILLIN: SIGNIFICANT CHANGE UP
-  AZTREONAM: SIGNIFICANT CHANGE UP
-  CEFAZOLIN: SIGNIFICANT CHANGE UP
-  CEFEPIME: SIGNIFICANT CHANGE UP
-  CEFTRIAXONE: SIGNIFICANT CHANGE UP
-  CIPROFLOXACIN: SIGNIFICANT CHANGE UP
-  CLINDAMYCIN: SIGNIFICANT CHANGE UP
-  CLINDAMYCIN: SIGNIFICANT CHANGE UP
-  DAPTOMYCIN: SIGNIFICANT CHANGE UP
-  DAPTOMYCIN: SIGNIFICANT CHANGE UP
-  ERTAPENEM: SIGNIFICANT CHANGE UP
-  ERYTHROMYCIN: SIGNIFICANT CHANGE UP
-  ERYTHROMYCIN: SIGNIFICANT CHANGE UP
-  GENTAMICIN: SIGNIFICANT CHANGE UP
-  IMIPENEM: SIGNIFICANT CHANGE UP
-  LEVOFLOXACIN: SIGNIFICANT CHANGE UP
-  LINEZOLID: SIGNIFICANT CHANGE UP
-  LINEZOLID: SIGNIFICANT CHANGE UP
-  MEROPENEM: SIGNIFICANT CHANGE UP
-  OXACILLIN: SIGNIFICANT CHANGE UP
-  OXACILLIN: SIGNIFICANT CHANGE UP
-  PENICILLIN: SIGNIFICANT CHANGE UP
-  PENICILLIN: SIGNIFICANT CHANGE UP
-  PIPERACILLIN/TAZOBACTAM: SIGNIFICANT CHANGE UP
-  RIFAMPIN: SIGNIFICANT CHANGE UP
-  RIFAMPIN: SIGNIFICANT CHANGE UP
-  TETRACYCLINE: SIGNIFICANT CHANGE UP
-  TETRACYCLINE: SIGNIFICANT CHANGE UP
-  TIGECYCLINE: SIGNIFICANT CHANGE UP
-  TOBRAMYCIN: SIGNIFICANT CHANGE UP
-  TRIMETHOPRIM/SULFAMETHOXAZOLE: SIGNIFICANT CHANGE UP
-  VANCOMYCIN: SIGNIFICANT CHANGE UP
-  VANCOMYCIN: SIGNIFICANT CHANGE UP
CULTURE RESULTS: ABNORMAL
METHOD TYPE: SIGNIFICANT CHANGE UP
ORGANISM # SPEC MICROSCOPIC CNT: ABNORMAL
SPECIMEN SOURCE: SIGNIFICANT CHANGE UP

## 2025-06-02 PROCEDURE — 99233 SBSQ HOSP IP/OBS HIGH 50: CPT

## 2025-06-02 PROCEDURE — 99223 1ST HOSP IP/OBS HIGH 75: CPT

## 2025-06-02 PROCEDURE — 99233 SBSQ HOSP IP/OBS HIGH 50: CPT | Mod: GC

## 2025-06-02 RX ORDER — FLUTICASONE PROPIONATE 50 UG/1
1 SPRAY, METERED NASAL
Refills: 0 | Status: DISCONTINUED | OUTPATIENT
Start: 2025-06-02 | End: 2025-06-03

## 2025-06-02 RX ORDER — ERTAPENEM SODIUM 1 G/1
1000 INJECTION, POWDER, LYOPHILIZED, FOR SOLUTION INTRAMUSCULAR; INTRAVENOUS EVERY 24 HOURS
Refills: 0 | Status: DISCONTINUED | OUTPATIENT
Start: 2025-06-02 | End: 2025-06-03

## 2025-06-02 RX ADMIN — Medication 500 MICROGRAM(S): at 05:06

## 2025-06-02 RX ADMIN — MUPIROCIN CALCIUM 1 APPLICATION(S): 20 CREAM TOPICAL at 05:07

## 2025-06-02 RX ADMIN — Medication 250 MILLIGRAM(S): at 11:40

## 2025-06-02 RX ADMIN — Medication 1 APPLICATION(S): at 13:40

## 2025-06-02 RX ADMIN — Medication 500 MICROGRAM(S): at 18:50

## 2025-06-02 RX ADMIN — FLUTICASONE PROPIONATE 1 SPRAY(S): 50 SPRAY, METERED NASAL at 18:49

## 2025-06-02 RX ADMIN — Medication 500 MICROGRAM(S): at 11:41

## 2025-06-02 RX ADMIN — Medication 40 MILLIGRAM(S): at 05:06

## 2025-06-02 RX ADMIN — PREDNISONE 10 MILLIGRAM(S): 20 TABLET ORAL at 05:06

## 2025-06-02 RX ADMIN — ERTAPENEM SODIUM 100 MILLIGRAM(S): 1 INJECTION, POWDER, LYOPHILIZED, FOR SOLUTION INTRAMUSCULAR; INTRAVENOUS at 11:41

## 2025-06-02 NOTE — PROGRESS NOTE ADULT - ASSESSMENT
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 Admitted for recurrent abdominal wall abscess s/p I&D. Cultures grew MRSA and pseudomonas. Patient completed antibiotic therapy. She underwent additional work up for transplant including MYRIAM, cMRI, MRCP, and CT colonography, here now with LUE abscess.    #pre lung transplant evaluation  Possible barriers to transplant:  - weight loss t with target BMI < 32. Ideally < 30 as this is a major barrier to transplant  -  nutritional/metabolic issues  - active/recurrent infections    Pulm  -will discuss with Pulm team if any alternative to therapy not via pump  -given skin infections/irritation consult derm to assess for allergy to adhesives and different options for surgical dressing for future      Plan for Ms Conway to follow up with our team on a monthly basis moving forward with the focus on optimizing the above concerns. 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 Admitted for recurrent abdominal wall aabscess s/p I&D. Cultures grew MRSA and pseudomonas. Patient completed antibiotic therapy. She underwent additional work up for transplant including MYRIAM, cMRI, MRCP, and CT colonography, here now with LUE abscess.    #pre lung transplant evaluation  Possible barriers to transplant:  - weight loss t with target BMI < 32. Ideally < 30 as this is a major barrier to transplant  -  nutritional/metabolic issues  - active/recurrent infections    Pulm  -will discuss with Pulm team if any alternative to therapy not via pump  -given skin infections/irritation consult derm to assess for allergy to adhesives and different options for surgical dressing for future      Plan for Ms Conway to follow up with our team on a monthly basis moving forward with the focus on optimizing the above concerns.

## 2025-06-02 NOTE — PROGRESS NOTE ADULT - SUBJECTIVE AND OBJECTIVE BOX
Lung Transplant Progress Note  =====================================================  24 hour events: Pt seen and examined bedside, resting comfortably on 3 L NC    T(C): 36.6 (06-02-25 @ 11:20), Max: 36.6 (06-01-25 @ 23:47)  HR: 90 (06-02-25 @ 11:20) (79 - 108)  BP: 97/60 (06-02-25 @ 11:20) (95/62 - 131/82)  RR: 18 (06-02-25 @ 11:20) (18 - 18)  SpO2: 95% (06-02-25 @ 11:20) (95% - 98%)    PAST MEDICAL & SURGICAL HISTORY:  Anemia      Pulmonary hypertension      Asthma  On home O2 nightly at 2L via NC      PE (pulmonary thromboembolism)  on Xarelto 10 mg daily      Sinusitis      Right heart failure      H/O pulmonary hypertension      Pulmonary embolism      History of tachycardia      On supplemental oxygen by nasal cannula  O2 @ 2L      Pacemaker      Right atrial thrombus      Hypotension      Pacemaker      History of pacemaker  12/19 - [employer] model Ingevity 4469        Home Meds: Home Medications:  acetaminophen 500 mg oral tablet: 2 tab(s) orally every 6 hours As needed Mild Pain (1 - 3) (24 Apr 2025 11:22)  Atrovent 18 mcg/inh inhalation aerosol: 1 inhaled 4 times a day as needed for  shortness of breath and/or wheezing (02 Apr 2025 16:24)  omeprazole 40 mg oral delayed release capsule: 1 cap(s) orally once a day (02 Apr 2025 16:24)  Remodulin 5 mg/mL injectable solution: 1 application injectable once a day via her own SQ PUMP (02 Apr 2025 16:24)    Allergies: Allergies    vancomycin (Rash)  adhesives (Rash)    Intolerances        REVIEW OF SYSTEMS    [x ] A ten-point review of systems was otherwise negative except as noted.  [ ] Due to altered mental status/intubation, subjective information were not able to be obtained from the patient. History was obtained, to the extent possible, from review of the chart and collateral sources of information.      CURRENT MEDICATIONS:   Neurologic Medications  oxycodone    5 mG/acetaminophen 325 mG 2 Tablet(s) Oral every 6 hours PRN Moderate Pain (4 - 6)    Respiratory Medications  ipratropium    for Nebulization 500 MICROGram(s) Nebulizer every 6 hours    Cardiovascular Medications    Gastrointestinal Medications  pantoprazole    Tablet 40 milliGRAM(s) Oral before breakfast    Genitourinary Medications    Hematologic/Oncologic Medications  heparin   Injectable 5000 Unit(s) SubCutaneous every 8 hours    Antimicrobial/Immunologic Medications  DAPTOmycin IVPB      DAPTOmycin IVPB 600 milliGRAM(s) IV Intermittent every 24 hours  ertapenem  IVPB 1000 milliGRAM(s) IV Intermittent every 24 hours    Endocrine/Metabolic Medications  predniSONE   Tablet 10 milliGRAM(s) Oral daily    Topical/Other Medications  chlorhexidine 2% Cloths 1 Application(s) Topical daily  fluticasone propionate 50 MICROgram(s)/spray Nasal Spray 1 Spray(s) Both Nostrils two times a day  mupirocin 2% Nasal 1 Application(s) Both Nostrils two times a day  Treprostinil Continuous SubQ Infusion 1 Dose(s) 1 Dose(s) SubCutaneous Continuous Pump      INS/OUTS (last 24 hours):  I&O's Summary    01 Jun 2025 07:01  -  02 Jun 2025 07:00  --------------------------------------------------------  IN: 480 mL / OUT: 0 mL / NET: 480 mL    02 Jun 2025 07:01  -  02 Jun 2025 19:54  --------------------------------------------------------  IN: 1180 mL / OUT: 0 mL / NET: 1180 mL      --------------------------------------------------------------------------------------    PHYSICAL EXAM:  GENERAL: NAD, resting comfortably in bed  HEAD:  Atraumatic, normocephalic  EYES: EOMI, PERRLA, conjunctiva and sclera clear  NECK: Supple  CHEST/LUNG: crackles B/L, good inspiratory effort  HEART: RRR. +S1/S2  ABDOMEN: Soft, nondistended, nontender to palpation  EXTREMITIES: No clubbing, cyanosis, or edema  PSYCH: A&O x3  NEUROLOGY: Non-focal, motor & sensory grossly intact  SKIN: Warm & dry, no rashes or lesions    LABS:                        11.8   9.90  )-----------( 311      ( 01 Jun 2025 06:14 )             38.8     06-01    136  |  103  |  10  ----------------------------<  79  3.7   |  19[L]  |  0.74    Ca    8.3[L]      01 Jun 2025 06:12        Urinalysis Basic - ( 01 Jun 2025 06:12 )    Color: x / Appearance: x / SG: x / pH: x  Gluc: 79 mg/dL / Ketone: x  / Bili: x / Urobili: x   Blood: x / Protein: x / Nitrite: x   Leuk Esterase: x / RBC: x / WBC x   Sq Epi: x / Non Sq Epi: x / Bacteria: x        Culture - Blood (collected 31 May 2025 00:10)  Source: Blood Blood-Peripheral  Gram Stain (01 Jun 2025 00:53):    Growth in anaerobic bottle: Gram Positive Cocci in Clusters  Final Report (02 Jun 2025 17:46):    Growth in anaerobic bottle: Methicillin Resistant Staphylococcus aureus    Direct identification is available within approximately 3-5    hours either by Blood Panel Multiplexed PCR or Direct    MALDI-TOF. Details: [email or web address]  Organism: Methicillin resistant Staphylococcus aureus (02 Jun 2025 17:46)  Organism: Blood Culture PCR  Methicillin resistant Staphylococcus aureus (02 Jun 2025 17:46)  Organism: Blood Culture PCR (02 Jun 2025 17:46)    Culture - Blood (collected 31 May 2025 00:10)  Source: Blood Blood-Peripheral  Gram Stain (01 Jun 2025 18:30):    Growth in anaerobic bottle: Gram Positive Cocci in Clusters    Growth in aerobic bottle: Gram Positive Cocci in Clusters  Final Report (02 Jun 2025 17:47):    Growth in aerobic and anaerobic bottles: Methicillin Resistant    Staphylococcus aureus    See previous culture [id number]    Culture - Wound Aerobic (collected 30 May 2025 23:49)  Source: Incision Incision  Final Report (02 Jun 2025 07:54):    Numerous Methicillin Resistant Staphylococcus aureus    Numerous Klebsiella pneumoniae ESBL  Organism: Methicillin resistant Staphylococcus aureus  Klebsiella pneumoniae ESBL (02 Jun 2025 07:54)  Organism: Methicillin resistant Staphylococcus aureus (02 Jun 2025 07:54)  Organism: Klebsiella pneumoniae ESBL (02 Jun 2025 07:54)      CAPILLARY BLOOD GLUCOSE          RADIOLOGY:

## 2025-06-02 NOTE — PROGRESS NOTE ADULT - ASSESSMENT
43 y/o female, PMH pHTN (on treprostinil SQ pump) currently on lung transplant list, HF (s/p PPM dual chamber 2016), chronic PE (dx 2017) no longer on DOAC, SVT (s/p ablation 05/2020), asthma, with recurrent abscesses (abdominal wall, UE, most recently MRSA) p/w upper extremity abscess

## 2025-06-02 NOTE — CONSULT NOTE ADULT - SUBJECTIVE AND OBJECTIVE BOX
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 In the ED afebrile and hemodynamically stable. Labs notable for wbc 11.4 (84% neutrophils), BMP wnl. s/p I&D in the ER and started on abx. Wound cx and blood cx (4/4 bottles) + for MRSA and ESBL klebisella. Tx ID following.     Seen and examined at bedside, said LUE feeling better post I&D. Regarding PPM, said she was admitted to [facility] in 05/2018 wherein during hospital stay her HR was "low" and she underwent PPM placement. Denied LOC at that time. Her device was interrogated today at bedside, AP and  <1%. Has episodes of -170 bpm that she reports symptoms with, most recently 5/2/25. Tele: Sinus in 90s-100s    Device: [employer] MRI EL [id number]  Leads:  RA - [employer] 4469; SN: [id number], implanted 5/29/2018  RV - [employer] Ingevity 7741; SN: [id number]; implanted 5/29/2018      MYRIAM 4/16/25:   1. Left ventricular wall thickness is normal. There is paradoxical septal motion consistent with right ventricular volume overload. Left ventricular systolic function is normal with an ejection fraction visually estimated at 60 to 65 %. There are no regional wall motion abnormalities seen.   2. Mildly enlarged right ventricular cavity size and mildly reduced right ventricular systolic function.   3. Normal left and right atrial size.   4. Moderate pulmonic regurgitation.   5. Severely dilated main pulmonary artery.   6. Unspecified wire seen in right heart is visualized.   7. Structurally normal pulmonic valve with normal leaflet excursion.   8. Mild focal atheroma in the visualized portions of the descending aorta.    TTE 4/9/25:   1. Left ventricular systolic function is normal with an ejection fraction visually estimated at 70 %. There are no regional wall motion abnormalities seen.   2. Severely enlarged right ventricular cavity size and mildly reduced right ventricular systolic function.   3. Severely dilated main pulmonary artery.   4. Mild pulmonary hypertension.   5. Device lead is visualized in the right heart.    PMHx:   Asthma    Pulmonary hypertension    Tachycardia    Anemia    Pulmonary hypertension    Pulmonary embolism    Asthma    Sinus tachycardia    Asthma with status asthmaticus, unspecified asthma severity    PE (pulmonary thromboembolism)    Keratoconus    Sinusitis    Corneal disorder    Right heart failure    CAD (coronary artery disease)    H/O pulmonary hypertension    Pulmonary embolism    Asthma    History of tachycardia    On supplemental oxygen by nasal cannula    Pacemaker    Skin mass    Right atrial thrombus    Hypotension        PSHx:   History of tubal ligation    No significant past surgical history    Pacemaker    H/O tubal ligation    History of pacemaker        Allergies:  vancomycin (Rash)  adhesives (Rash)      Medications:   chlorhexidine 2% Cloths 1 Application(s) Topical daily  DAPTOmycin IVPB      DAPTOmycin IVPB 600 milliGRAM(s) IV Intermittent every 24 hours  ertapenem  IVPB 1000 milliGRAM(s) IV Intermittent every 24 hours  heparin   Injectable 5000 Unit(s) SubCutaneous every 8 hours  ipratropium    for Nebulization 500 MICROGram(s) Nebulizer every 6 hours  mupirocin 2% Nasal 1 Application(s) Both Nostrils two times a day  oxycodone    5 mG/acetaminophen 325 mG 2 Tablet(s) Oral every 6 hours PRN  pantoprazole    Tablet 40 milliGRAM(s) Oral before breakfast  predniSONE   Tablet 10 milliGRAM(s) Oral daily  Treprostinil Continuous SubQ Infusion 1 Dose(s) 1 Dose(s) SubCutaneous Continuous Pump      FAMILY HISTORY:  Family history of diabetes mellitus  in brother        Social History:  Smoking History:  Alcohol Use:  Drug Use:    Review of Systems:  REVIEW OF SYSTEMS:  CONSTITUTIONAL: No weakness, fevers or chills  EYES/ENT: No visual changes;  No dysphagia  NECK: No pain or stiffness  RESPIRATORY: No cough, wheezing, hemoptysis; No shortness of breath  CARDIOVASCULAR: No chest pain or palpitations; No lower extremity edema  GASTROINTESTINAL: No abdominal or epigastric pain. No nausea, vomiting, or hematemesis; No diarrhea or constipation. No melena or hematochezia.  BACK: No back pain  GENITOURINARY: No dysuria, frequency or hematuria  NEUROLOGICAL: No numbness or weakness  SKIN: No itching, burning, rashes, or lesions   All other review of systems is negative unless indicated above.    Physical Exam:  T(F): 97.8 (06-02), Max: 97.8 (06-01)  HR: 90 (06-02) (79 - 108)  BP: 97/60 (06-02) (95/62 - 131/82)  RR: 18 (06-02)  SpO2: 95% (06-02)  GENERAL: No acute distress, well-developed  HEAD:  Atraumatic, Normocephalic  ENT: EOMI, PERRLA, conjunctiva and sclera clear, Neck supple, No JVD, moist mucosa  CHEST/LUNG: Clear to auscultation bilaterally; No wheeze, equal breath sounds bilaterally   BACK: No spinal tenderness  HEART: Regular rate and rhythm; No murmurs, rubs, or gallops  ABDOMEN: Soft, Nontender, Nondistended; Bowel sounds present  EXTREMITIES:  No clubbing, cyanosis, or edema  PSYCH: Nl behavior, nl affect  NEUROLOGY: AAOx3, non-focal, cranial nerves intact  SKIN: Normal color, No rashes or lesions  LINES:    Cardiovascular Diagnostic Testing:    ECG: Personally reviewed:    Echo: Personally reviewed:    Stress Testing:    Cath:    Imaging:    CXR: Personally reviewed    Labs: Personally reviewed                        11.8   9.90  )-----------( 311      ( 01 Jun 2025 06:14 )             38.8     06-01    136  |  103  |  10  ----------------------------<  79  3.7   |  19[L]  |  0.74    Ca    8.3[L]      01 Jun 2025 06:12

## 2025-06-02 NOTE — PROGRESS NOTE ADULT - ASSESSMENT
44 year old female, PMH pHTN (on treprostinil SQ pump) currently on lung transplant list, HF (s/p PPM dual chamber 2016), chronic PE (dx 2017) no longer on DOAC, SVT (s/p ablation 05/2020), asthma, with recurrent abscesses (abdominal wall, UE, most recently MRSA) p/w upper extremity abscess.    #MRSA bacteremia  --Recommend transthoracic echocardiogram  --Recommend 2 sets of repeat blood cultures with a.m. labs  --Continue daptomycin 600 mg IV every 24 hours    #Skin Abscess, Positive culture finding  --Recommend addition of ceftriaxone 2 g IV every 24 hours given growth of Klebsiella on abscess cultures  --Recommend pursuing CT of left upper extremity to exclude residual collection (in context of MRSA bacteremia)  --Continue daptomycin as above  --Follow up on prelim wound culture    #Human metapneumovirus infection  --Supportive care  --Contact isolation per infection control policy    #Pre-Lung Transplant Evaluation  COVID19 Nucleocapsid Antibody Negative  COVID19 Rajinder Antibody Positive  HAV IgG Negative  HBVs Ab Negative  HBVsAg Negative  HBVc Ab Negative  HCV Ab Negative  HSV 1 IgG Positive  HSV 2 IgG Negative  EBV IgG Positive  CMV IgG Positive  VZV IgG Positive  Measles IgG Positive  Mumps IgG Positive  Rubella IgG Positive  Quantiferon Gold Negative  HIV Ag/Ab by CMIA Negative  Syphilis Screen Negative  Toxoplasma IgG Negative  Strongyloides Ab Negative  Trypanosoma cruzi Ab Negative  --ID clearance for lung transplant pending resolution of LUE arm abscess and treatment of MRSA bacteremia    #Encounter to Vaccinate Patient  COVID19: Would benefit from COVID19 [id number] Vaccine Dose  Influenza: needs this next season ,   RSV: Arexvy 3/6/25  Pneumococcal: States she had pneumococcal vaccination ~2022  HAV: Would benefit from Havrix  HBV: Heplisav Dose 1 3/6/25. Dose 2 of Heplisav ordered  MMR: Rubella IgG pending. Mumps and Measles immune  Varicella: Immune will not require further vaccination  Shingles: Will recommend Shingrix  Tdap: Will recommend Tdap    Interval events, labs, documents, radiology, microbiology and infection control needs reviewed    Sreekanth Jauregui MD  Can be called via Teams  After 5pm/weekends [phone]

## 2025-06-02 NOTE — PROGRESS NOTE ADULT - SUBJECTIVE AND OBJECTIVE BOX
INFECTIOUS DISEASES FOLLOW UP-- Aleja Jauregui  [phone]    This is a follow up note for this  44yFemale with  Cutaneous abscess of left upper extremity        ROS:  CONSTITUTIONAL:  No fever, good appetite  CARDIOVASCULAR:  No chest pain or palpitations  RESPIRATORY:  No dyspnea  GASTROINTESTINAL:  No nausea, vomiting, diarrhea, or abdominal pain  GENITOURINARY:  No dysuria  NEUROLOGIC:  No headache,     Allergies    vancomycin (Rash)  adhesives (Rash)    Intolerances        ANTIBIOTICS/RELEVANT:  antimicrobials  DAPTOmycin IVPB      DAPTOmycin IVPB 600 milliGRAM(s) IV Intermittent every 24 hours  ertapenem  IVPB 1000 milliGRAM(s) IV Intermittent every 24 hours    immunologic:    OTHER:  chlorhexidine 2% Cloths 1 Application(s) Topical daily  fluticasone propionate 50 MICROgram(s)/spray Nasal Spray 1 Spray(s) Both Nostrils two times a day  heparin   Injectable 5000 Unit(s) SubCutaneous every 8 hours  ipratropium    for Nebulization 500 MICROGram(s) Nebulizer every 6 hours  mupirocin 2% Nasal 1 Application(s) Both Nostrils two times a day  oxycodone    5 mG/acetaminophen 325 mG 2 Tablet(s) Oral every 6 hours PRN  pantoprazole    Tablet 40 milliGRAM(s) Oral before breakfast  predniSONE   Tablet 10 milliGRAM(s) Oral daily  Treprostinil Continuous SubQ Infusion 1 Dose(s) 1 Dose(s) SubCutaneous Continuous Pump      Objective:  Vital Signs Last 24 Hrs  T(C): 36.6 (02 Jun 2025 11:20), Max: 36.6 (01 Jun 2025 23:47)  T(F): 97.8 (02 Jun 2025 11:20), Max: 97.8 (01 Jun 2025 23:47)  HR: 90 (02 Jun 2025 11:20) (79 - 108)  BP: 97/60 (02 Jun 2025 11:20) (95/62 - 131/82)  BP(mean): --  RR: 18 (02 Jun 2025 11:20) (18 - 18)  SpO2: 95% (02 Jun 2025 11:20) (95% - 98%)    Parameters below as of 02 Jun 2025 11:20  Patient On (Oxygen Delivery Method): room air        PHYSICAL EXAM:  Constitutional:no acute distress  Eyes:DEMETRIUS, EOMI  Ear/Nose/Throat: no oral lesions, 	  Respiratory: clear BL  Cardiovascular: S1S2  Gastrointestinal:soft, (+) BS, no tenderness  Extremities:no e/e/c   with ace bandage  No Lymphadenopathy  IV sites not inflammed.    LABS:                        11.8   9.90  )-----------( 311      ( 01 Jun 2025 06:14 )             38.8     06-01    136  |  103  |  10  ----------------------------<  79  3.7   |  19[L]  |  0.74    Ca    8.3[L]      01 Jun 2025 06:12        Urinalysis Basic - ( 01 Jun 2025 06:12 )    Color: x / Appearance: x / SG: x / pH: x  Gluc: 79 mg/dL / Ketone: x  / Bili: x / Urobili: x   Blood: x / Protein: x / Nitrite: x   Leuk Esterase: x / RBC: x / WBC x   Sq Epi: x / Non Sq Epi: x / Bacteria: x        MICROBIOLOGY:      MRSA/MSSA PCR (06.01.25 @ 10:48)    MRSA PCR Result.: Detected: The results of this test should be interpreted with consideration of  clinical context.  Not Detected result indicates the absence of organisms or that the number  of organisms is below the assay limit of detection.  Detected result indicates the presence of organism nucleic acid.  Indeterminate result may indicate the presence of amplification  inhibitors in specimen; presence or absence of organisms cannot be  determined. Consider collecting new specimen if further testing is still  needed.  This qualitative PCR assay is FDA-approved, and its performance was  established by [facility], [place], [place].   Staph aureus PCR Result: Detected          RECENT CULTURES:  05-31 @ 00:10  Blood Blood-Peripheral  Blood Culture PCR  Methicillin resistant Staphylococcus aureus  Blood Culture PCR  PCR    Growth in anaerobic bottle: Methicillin Resistant Staphylococcus aureus  Direct identification is available within approximately 3-5  hours either by Blood Panel Multiplexed PCR or Direct  MALDI-TOF. Details: [email or web address]  --  05-30 @ 23:49  Incision Incision  Methicillin resistant Staphylococcus aureus  Klebsiella pneumoniae ESBL  Methicillin resistant Staphylococcus aureus  BRIAN    Numerous Methicillin Resistant Staphylococcus aureus  Numerous Klebsiella pneumoniae ESBL  --      RADIOLOGY & ADDITIONAL STUDIES:

## 2025-06-02 NOTE — PROGRESS NOTE ADULT - PROBLEM SELECTOR PLAN 2
-off of xarelto, nifedipine, diuretics  -continue remodulin - f/u with pulm/ID if pump needs to be adjusted with infections.   -continue atrovent  -continue NC  -Transplant candidacy reviewed, currently evaluation closed until BMI closer to target in addition to recurrent infections more under control  - F/U transplant pulm team for further recs

## 2025-06-02 NOTE — PROGRESS NOTE ADULT - ASSESSMENT
44F w/ PMH of pHTN on Remodulin sq, UE DVT on Xarelto, Asthma, CHF  She is also following with transplant pulmonary though currently not a candidate.  Comes to the ED with concern with concern for UE abscess.  Now being admitted for I&D and likely IV abx.    # pHTN  Patient has a known history of PAH on remodulin. Also one O2 at home, 2-3lpm. Tentative plan to transition from remodulin to winrevair or add it as adjunct soon per Dr Yoon, but not during current state of infection. Appears stable from baseline. Previously also on Xarelto, Bumex, Aldactone  - continue home Remodulin 51ng/kg/min on home pump  - monitor volume status, diurese as needed to keep net even  - monitor K, Na, Mg  - supply with chlorhexidine wipes instead of alcohol wipes to use when injecting remodulin to try to prevent further SSTIs  - 3/6 holosystolic murmur heard today on exam -- pt reported having hx of murmur but given MRSA bacteremia, would recd obtaining repeat TTE    # asthma  Patient with known asthma, On Fasenra. PFTs from 1/2025 w/ severe obstruction, low DLCO.  - continue home equivalent ipratropium, levalbuterol (off budesonide due to facial rash)  - continue flonase  - PRN levalbuterol for wheezing, q6h       44F w/ PMH of pHTN on Remodulin sq, UE DVT on Xarelto, Asthma, CHF  She is also following with transplant pulmonary though currently not a candidate.  Comes to the ED with concern with concern for UE abscess.  Now being admitted for I&D and likely IV abx.    # pHTN  Patient has a known history of PAH on remodulin. Also one O2 at home, 2-3lpm. Tentative plan to transition from remodulin to winrevair or add it as adjunct soon per Dr Yoon, but not during current state of infection. Appears stable from baseline. Previously also on Xarelto, Bumex, Aldactone  - continue home Remodulin 51ng/kg/min on home pump  - monitor volume status, diurese as needed to keep net even  - monitor K, Na, Mg  -  consider trial of chlorhexidine wipes instead of alcohol wipes to use when injecting remodulin to try to prevent further SSTIs  - 3/6 holosystolic murmur heard today on exam -- pt reported having hx of murmur but given MRSA bacteremia, would recd obtaining repeat TTE    # asthma  Patient with known asthma, On Fasenra. PFTs from 1/2025 w/ severe obstruction, low DLCO.  - continue home equivalent ipratropium, levalbuterol (off budesonide due to facial rash)  - continue flonase  - PRN levalbuterol for wheezing, q6h

## 2025-06-02 NOTE — PROGRESS NOTE ADULT - SUBJECTIVE AND OBJECTIVE BOX
Interval Events: continued on remodulin. wound cx also growing ESBL kleb    PHYSICAL EXAM:  General: nad  HEENT: MMM, PERRLA  Respiratory: ctab  Cardiovascular: 3/6 holosystolic murmur present, RRR  Abdomen: NTND  Extremities: warm, abscess site dressed and clean  Neurological: AOx3, no gross deficits    REVIEW OF SYSTEMS:  All systems negative unless described below:    OBJECTIVE:  ICU Vital Signs Last 24 Hrs  T(C): 36.6 (02 Jun 2025 11:20), Max: 36.6 (01 Jun 2025 16:29)  T(F): 97.8 (02 Jun 2025 11:20), Max: 97.8 (01 Jun 2025 16:29)  HR: 90 (02 Jun 2025 11:20) (79 - 108)  BP: 97/60 (02 Jun 2025 11:20) (95/62 - 131/82)  BP(mean): --  ABP: --  ABP(mean): --  RR: 18 (02 Jun 2025 11:20) (18 - 18)  SpO2: 95% (02 Jun 2025 11:20) (95% - 98%)    O2 Parameters below as of 02 Jun 2025 11:20  Patient On (Oxygen Delivery Method): room air              06-01 @ 07:01  -  06-02 @ 07:00  --------------------------------------------------------  IN: 480 mL / OUT: 0 mL / NET: 480 mL    06-02 @ 07:01  -  06-02 @ 16:19  --------------------------------------------------------  IN: 1180 mL / OUT: 0 mL / NET: 1180 mL      CAPILLARY BLOOD GLUCOSE              HOSPITAL MEDICATIONS:  heparin   Injectable 5000 Unit(s) SubCutaneous every 8 hours    DAPTOmycin IVPB      DAPTOmycin IVPB 600 milliGRAM(s) IV Intermittent every 24 hours  ertapenem  IVPB 1000 milliGRAM(s) IV Intermittent every 24 hours      predniSONE   Tablet 10 milliGRAM(s) Oral daily    ipratropium    for Nebulization 500 MICROGram(s) Nebulizer every 6 hours    oxycodone    5 mG/acetaminophen 325 mG 2 Tablet(s) Oral every 6 hours PRN    pantoprazole    Tablet 40 milliGRAM(s) Oral before breakfast            chlorhexidine 2% Cloths 1 Application(s) Topical daily  mupirocin 2% Nasal 1 Application(s) Both Nostrils two times a day    Treprostinil Continuous SubQ Infusion 1 Dose(s) 1 Dose(s) SubCutaneous Continuous Pump      LABS:                        11.8   9.90  )-----------( 311      ( 01 Jun 2025 06:14 )             38.8     Hgb Trend: 11.8<--, 13.0<--  06-01    136  |  103  |  10  ----------------------------<  79  3.7   |  19[L]  |  0.74    Ca    8.3[L]      01 Jun 2025 06:12      Creatinine Trend: 0.74<--, 0.97<--    Urinalysis Basic - ( 01 Jun 2025 06:12 )    Color: x / Appearance: x / SG: x / pH: x  Gluc: 79 mg/dL / Ketone: x  / Bili: x / Urobili: x   Blood: x / Protein: x / Nitrite: x   Leuk Esterase: x / RBC: x / WBC x   Sq Epi: x / Non Sq Epi: x / Bacteria: x            MICROBIOLOGY:     RADIOLOGY:  [x] Reviewed and interpreted by me

## 2025-06-02 NOTE — PROGRESS NOTE ADULT - PROBLEM SELECTOR PLAN 1
Has a history of abscesses notably abdominal (grown MRSA and pseudomonas in the past). Current location LUE.   -s/p I&D, f/u culture - MRSA and ESBL Klebsiella growing.   >    > 6/1 : blood cx + GPC, MRSA pcr pos > s/p Ceftriaxone per ID recs. -repeat blood cx x2 - testing. Check TTE. Check LUE CT with IV contrast.   -Abx now dapto and ertapenem.   - on Dapto, s/p Azithro per ID  - F/U ID team for further recs  -MRSA bacteremia. F/u repeat blood cultures. -EP for PPM removal tomorrow. -MYRIAM intraop.   -tele.

## 2025-06-02 NOTE — PROGRESS NOTE ADULT - SUBJECTIVE AND OBJECTIVE BOX
Patient is a 44y old  Female who presents with a chief complaint of Abscess (02 Jun 2025 17:51)        SUBJECTIVE / OVERNIGHT EVENTS: no complaints.       MEDICATIONS  (STANDING):  chlorhexidine 2% Cloths 1 Application(s) Topical daily  DAPTOmycin IVPB      DAPTOmycin IVPB 600 milliGRAM(s) IV Intermittent every 24 hours  ertapenem  IVPB 1000 milliGRAM(s) IV Intermittent every 24 hours  fluticasone propionate 50 MICROgram(s)/spray Nasal Spray 1 Spray(s) Both Nostrils two times a day  heparin   Injectable 5000 Unit(s) SubCutaneous every 8 hours  ipratropium    for Nebulization 500 MICROGram(s) Nebulizer every 6 hours  mupirocin 2% Nasal 1 Application(s) Both Nostrils two times a day  pantoprazole    Tablet 40 milliGRAM(s) Oral before breakfast  predniSONE   Tablet 10 milliGRAM(s) Oral daily  Treprostinil Continuous SubQ Infusion 1 Dose(s) 1 Dose(s) SubCutaneous Continuous Pump    MEDICATIONS  (PRN):  oxycodone    5 mG/acetaminophen 325 mG 2 Tablet(s) Oral every 6 hours PRN Moderate Pain (4 - 6)      Vital Signs Last 24 Hrs  T(C): 36.6 (02 Jun 2025 11:20), Max: 36.6 (01 Jun 2025 23:47)  T(F): 97.8 (02 Jun 2025 11:20), Max: 97.8 (01 Jun 2025 23:47)  HR: 90 (02 Jun 2025 11:20) (79 - 108)  BP: 97/60 (02 Jun 2025 11:20) (95/62 - 131/82)  BP(mean): --  RR: 18 (02 Jun 2025 11:20) (18 - 18)  SpO2: 95% (02 Jun 2025 11:20) (95% - 98%)    Parameters below as of 02 Jun 2025 11:20  Patient On (Oxygen Delivery Method): room air      CAPILLARY BLOOD GLUCOSE        I&O's Summary    01 Jun 2025 07:01  -  02 Jun 2025 07:00  --------------------------------------------------------  IN: 480 mL / OUT: 0 mL / NET: 480 mL    02 Jun 2025 07:01  -  02 Jun 2025 19:03  --------------------------------------------------------  IN: 1180 mL / OUT: 0 mL / NET: 1180 mL          PHYSICAL EXAM:   GENERAL: NAD, well-developed  HEAD:  Atraumatic, Normocephalic  EYES: EOMI, PERRLA, conjunctiva and sclera clear  NECK: Supple, No JVD  CHEST/LUNG: Clear to auscultation bilaterally; No wheeze  HEART: S1S2 normal. Regular rate and rhythm; No murmurs, rubs, or gallops  ABDOMEN: Soft, Nontender, Nondistended; Bowel sounds present  EXTREMITIES:  2+ Peripheral Pulses, No clubbing, cyanosis, or edema  PSYCH/Neuro: AAOx3. Non-focal.   SKIN: LUE wrapped.       LABS:                        11.8   9.90  )-----------( 311      ( 01 Jun 2025 06:14 )             38.8     06-01    136  |  103  |  10  ----------------------------<  79  3.7   |  19[L]  |  0.74    Ca    8.3[L]      01 Jun 2025 06:12            Urinalysis Basic - ( 01 Jun 2025 06:12 )    Color: x / Appearance: x / SG: x / pH: x  Gluc: 79 mg/dL / Ketone: x  / Bili: x / Urobili: x   Blood: x / Protein: x / Nitrite: x   Leuk Esterase: x / RBC: x / WBC x   Sq Epi: x / Non Sq Epi: x / Bacteria: x          RADIOLOGY & ADDITIONAL TESTS:    Imaging Personally Reviewed:  Consultant(s) Notes Reviewed:  EP and pulm and ID  Care Discussed with Consultants/Other Providers:

## 2025-06-02 NOTE — PHARMACOTHERAPY INTERVENTION NOTE - NSPHARMCOMMASP
Patient Education     Sty (or Stye)  A sty is an infection of the oil gland of the eyelid. It may develop into a small pocket of pus (an abscess). This can cause pain, redness, and swelling. In early stages, a sty is treated with antibiotic cream, eye drops, or a small towel soaked in warm water (a warm compress). More severe cases may need to be opened and drained by a healthcare provider.  Home care  · Eye drops or ointment are usually prescribed to treat the infection. Use these as directed.   · Artificial tears may also be used to lubricate the eye and make it more comfortable. You can buy these over the counter without a prescription. Talk with your healthcare provider before using any over-the-counter treatment for a sty.  · Apply a warm, damp towel to the affected eye for at least 5 minutes, 3 to 4 times a day for a week. Warm compresses open the pores and speed the healing. But if the compresses are too hot, they may burn your eyelid.  · Sometimes the sty will drain with this treatment alone. If this happens, keep using the antibiotic until all the redness and swelling are gone.  · Wash your hands before and after touching the infected eyelid to avoid spreading the infection.  · Don’t squeeze or try to break open the sty.  Follow-up care  Follow up with your healthcare provider, or as advised.   When to seek medical advice  Call your healthcare provider right away if any of these occur:  · Increase in swelling or redness around the eyelid after 48 to 72 hours  · Increase in eye pain or the eyelid blisters  · Increase in warmth--the eyelid feels hot  · Drainage of blood or thick pus from the sty  · Blister on the eyelid  · Inability to open the eyelid due to swelling  · Fever of 100.4°F (38°C) or above, or as directed by your provider  · Vision changes  · Headache or stiff neck  · The sty comes back  Date Last Reviewed: 8/1/2017  © 8670-0537 [employer]. [address] [facility], [place], [place]  [address]. All rights reserved. This information is not intended as a substitute for professional medical care. Always follow your healthcare professional's instructions.            ASP - Drug-Bug mismatch

## 2025-06-02 NOTE — CONSULT NOTE ADULT - ASSESSMENT
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 In the ED afebrile and hemodynamically stable. Labs notable for wbc 11.4 (84% neutrophils), BMP wnl. s/p I&D in the ER and started on abx. Wound cx and blood cx (4/4 bottles) + for MRSA and ESBL klebisella. Tx ID following, TTE 04/25 with normal LVEF, reduced RV fx, mod RI, severe mPA dilation, mild pHTN. EP consulted for extraction.     -She is not PPM dependent and has been <1% AP/ over the past year  -Discussed the risks and benefits of PPM extraction in detail and she is amenable  -NPO past midnight tonight for PPM extraction tomorrow with Dr. Powell in the OR, no reimplantation  -Type and Screen x 2 for tomorrow AM  -C/w IV abx per ID   -F/u repeat cx    D/w Dr. Christensen and Dr. Powell

## 2025-06-03 ENCOUNTER — APPOINTMENT (OUTPATIENT)
Dept: MRI IMAGING | Facility: HOSPITAL | Age: 45
End: 2025-06-03

## 2025-06-03 ENCOUNTER — RESULT REVIEW (OUTPATIENT)
Age: 45
End: 2025-06-03

## 2025-06-03 ENCOUNTER — NON-APPOINTMENT (OUTPATIENT)
Age: 45
End: 2025-06-03

## 2025-06-03 LAB
ANION GAP SERPL CALC-SCNC: 11 MMOL/L — SIGNIFICANT CHANGE UP (ref 5–17)
BLD GP AB SCN SERPL QL: NEGATIVE — SIGNIFICANT CHANGE UP
BUN SERPL-MCNC: 11 MG/DL — SIGNIFICANT CHANGE UP (ref 7–23)
CALCIUM SERPL-MCNC: 8.2 MG/DL — LOW (ref 8.4–10.5)
CHLORIDE SERPL-SCNC: 103 MMOL/L — SIGNIFICANT CHANGE UP (ref 96–108)
CO2 SERPL-SCNC: 23 MMOL/L — SIGNIFICANT CHANGE UP (ref 22–31)
CREAT SERPL-MCNC: 0.72 MG/DL — SIGNIFICANT CHANGE UP (ref 0.5–1.3)
CRP SERPL-MCNC: 5 MG/L — HIGH (ref 0–4)
EGFR: 106 ML/MIN/1.73M2 — SIGNIFICANT CHANGE UP
EGFR: 106 ML/MIN/1.73M2 — SIGNIFICANT CHANGE UP
ERYTHROCYTE [SEDIMENTATION RATE] IN BLOOD: 12 MM/HR — SIGNIFICANT CHANGE UP (ref 0–20)
GLUCOSE SERPL-MCNC: 88 MG/DL — SIGNIFICANT CHANGE UP (ref 70–99)
HCG UR QL: NEGATIVE — SIGNIFICANT CHANGE UP
HCT VFR BLD CALC: 36.7 % — SIGNIFICANT CHANGE UP (ref 34.5–45)
HGB BLD-MCNC: 11.6 G/DL — SIGNIFICANT CHANGE UP (ref 11.5–15.5)
INR BLD: 1.05 RATIO — SIGNIFICANT CHANGE UP (ref 0.85–1.16)
MCHC RBC-ENTMCNC: 22.9 PG — LOW (ref 27–34)
MCHC RBC-ENTMCNC: 31.6 G/DL — LOW (ref 32–36)
MCV RBC AUTO: 72.5 FL — LOW (ref 80–100)
NRBC BLD AUTO-RTO: 0 /100 WBCS — SIGNIFICANT CHANGE UP (ref 0–0)
PLATELET # BLD AUTO: 344 K/UL — SIGNIFICANT CHANGE UP (ref 150–400)
POTASSIUM SERPL-MCNC: 3.3 MMOL/L — LOW (ref 3.5–5.3)
POTASSIUM SERPL-SCNC: 3.3 MMOL/L — LOW (ref 3.5–5.3)
PROCALCITONIN SERPL-MCNC: 0.06 NG/ML — SIGNIFICANT CHANGE UP (ref 0.02–0.1)
PROTHROM AB SERPL-ACNC: 12 SEC — SIGNIFICANT CHANGE UP (ref 9.9–13.4)
RBC # BLD: 5.06 M/UL — SIGNIFICANT CHANGE UP (ref 3.8–5.2)
RBC # FLD: 17.3 % — HIGH (ref 10.3–14.5)
RH IG SCN BLD-IMP: POSITIVE — SIGNIFICANT CHANGE UP
SODIUM SERPL-SCNC: 137 MMOL/L — SIGNIFICANT CHANGE UP (ref 135–145)
WBC # BLD: 9.84 K/UL — SIGNIFICANT CHANGE UP (ref 3.8–10.5)
WBC # FLD AUTO: 9.84 K/UL — SIGNIFICANT CHANGE UP (ref 3.8–10.5)

## 2025-06-03 PROCEDURE — 71045 X-RAY EXAM CHEST 1 VIEW: CPT | Mod: 26

## 2025-06-03 PROCEDURE — 99232 SBSQ HOSP IP/OBS MODERATE 35: CPT

## 2025-06-03 PROCEDURE — 33235 REMOVAL PACEMAKER ELECTRODE: CPT

## 2025-06-03 PROCEDURE — 73201 CT UPPER EXTREMITY W/DYE: CPT | Mod: 26,LT

## 2025-06-03 DEVICE — LEAD LOK DVC
Type: IMPLANTABLE DEVICE | Site: LEFT | Status: NON-FUNCTIONAL
Removed: 2025-06-03

## 2025-06-03 DEVICE — KIT A-LINE 1LUM 20G X 12CM SAFE KIT
Type: IMPLANTABLE DEVICE | Site: LEFT | Status: NON-FUNCTIONAL
Removed: 2025-06-03

## 2025-06-03 DEVICE — DEVICE LOCKING LOCKING LLD EZ CLEARS
Type: IMPLANTABLE DEVICE | Site: LEFT | Status: NON-FUNCTIONAL
Removed: 2025-06-03

## 2025-06-03 DEVICE — KIT BRIDGE ACESSORY
Type: IMPLANTABLE DEVICE | Site: LEFT | Status: NON-FUNCTIONAL
Removed: 2025-06-03

## 2025-06-03 DEVICE — SHEATH GLIDELIGHT LASER 16FR
Type: IMPLANTABLE DEVICE | Site: LEFT | Status: NON-FUNCTIONAL
Removed: 2025-06-03

## 2025-06-03 DEVICE — CATH VASC EXPO MULTIPURPOSE A CURVE MPA1 5FR X 100CMM
Type: IMPLANTABLE DEVICE | Site: LEFT | Status: NON-FUNCTIONAL
Removed: 2025-06-03

## 2025-06-03 DEVICE — GUIDEWIRE GLIDEWIRE TERUMO 0.035" X 180 CM, 3CM ANGLE TIP
Type: IMPLANTABLE DEVICE | Site: LEFT | Status: NON-FUNCTIONAL
Removed: 2025-06-03

## 2025-06-03 DEVICE — IMPLANTABLE DEVICE
Type: IMPLANTABLE DEVICE | Site: LEFT | Status: NON-FUNCTIONAL
Removed: 2025-06-03

## 2025-06-03 RX ORDER — IPRATROPIUM BROMIDE AND ALBUTEROL SULFATE .5; 2.5 MG/3ML; MG/3ML
3 SOLUTION RESPIRATORY (INHALATION) ONCE
Refills: 0 | Status: COMPLETED | OUTPATIENT
Start: 2025-06-03 | End: 2025-06-03

## 2025-06-03 RX ORDER — FLUTICASONE PROPIONATE 50 UG/1
1 SPRAY, METERED NASAL
Refills: 0 | Status: DISCONTINUED | OUTPATIENT
Start: 2025-06-03 | End: 2025-06-11

## 2025-06-03 RX ORDER — OXYCODONE HYDROCHLORIDE 30 MG/1
5 TABLET ORAL ONCE
Refills: 0 | Status: DISCONTINUED | OUTPATIENT
Start: 2025-06-03 | End: 2025-06-07

## 2025-06-03 RX ORDER — OXYCODONE HYDROCHLORIDE 30 MG/1
10 TABLET ORAL EVERY 6 HOURS
Refills: 0 | Status: DISCONTINUED | OUTPATIENT
Start: 2025-06-03 | End: 2025-06-04

## 2025-06-03 RX ORDER — SODIUM CHLORIDE 9 G/1000ML
1000 INJECTION, SOLUTION INTRAVENOUS
Refills: 0 | Status: DISCONTINUED | OUTPATIENT
Start: 2025-06-03 | End: 2025-06-03

## 2025-06-03 RX ORDER — OXYCODONE HYDROCHLORIDE 30 MG/1
5 TABLET ORAL EVERY 4 HOURS
Refills: 0 | Status: DISCONTINUED | OUTPATIENT
Start: 2025-06-03 | End: 2025-06-10

## 2025-06-03 RX ORDER — ERTAPENEM SODIUM 1 G/1
1000 INJECTION, POWDER, LYOPHILIZED, FOR SOLUTION INTRAMUSCULAR; INTRAVENOUS EVERY 24 HOURS
Refills: 0 | Status: COMPLETED | OUTPATIENT
Start: 2025-06-03 | End: 2025-06-09

## 2025-06-03 RX ORDER — ACETAMINOPHEN 500 MG/5ML
650 LIQUID (ML) ORAL EVERY 6 HOURS
Refills: 0 | Status: DISCONTINUED | OUTPATIENT
Start: 2025-06-03 | End: 2025-06-11

## 2025-06-03 RX ORDER — HEPARIN SODIUM 1000 [USP'U]/ML
5000 INJECTION INTRAVENOUS; SUBCUTANEOUS EVERY 8 HOURS
Refills: 0 | Status: DISCONTINUED | OUTPATIENT
Start: 2025-06-03 | End: 2025-06-04

## 2025-06-03 RX ORDER — FENTANYL CITRATE-0.9 % NACL/PF 100MCG/2ML
25 SYRINGE (ML) INTRAVENOUS
Refills: 0 | Status: DISCONTINUED | OUTPATIENT
Start: 2025-06-03 | End: 2025-06-03

## 2025-06-03 RX ORDER — HYDROMORPHONE/SOD CHLOR,ISO/PF 2 MG/10 ML
0.5 SYRINGE (ML) INJECTION
Refills: 0 | Status: DISCONTINUED | OUTPATIENT
Start: 2025-06-03 | End: 2025-06-03

## 2025-06-03 RX ORDER — MUPIROCIN CALCIUM 20 MG/G
1 CREAM TOPICAL
Refills: 0 | Status: COMPLETED | OUTPATIENT
Start: 2025-06-03 | End: 2025-06-08

## 2025-06-03 RX ORDER — ACETAMINOPHEN 500 MG/5ML
1000 LIQUID (ML) ORAL ONCE
Refills: 0 | Status: COMPLETED | OUTPATIENT
Start: 2025-06-03 | End: 2025-06-03

## 2025-06-03 RX ORDER — KETOROLAC TROMETHAMINE 30 MG/ML
15 INJECTION, SOLUTION INTRAMUSCULAR; INTRAVENOUS ONCE
Refills: 0 | Status: DISCONTINUED | OUTPATIENT
Start: 2025-06-03 | End: 2025-06-04

## 2025-06-03 RX ORDER — ONDANSETRON HCL/PF 4 MG/2 ML
4 VIAL (ML) INJECTION ONCE
Refills: 0 | Status: DISCONTINUED | OUTPATIENT
Start: 2025-06-03 | End: 2025-06-03

## 2025-06-03 RX ORDER — PREDNISONE 20 MG/1
5 TABLET ORAL DAILY
Refills: 0 | Status: COMPLETED | OUTPATIENT
Start: 2025-06-03 | End: 2025-06-06

## 2025-06-03 RX ORDER — DAPTOMYCIN 500 MG/10ML
600 INJECTION, POWDER, LYOPHILIZED, FOR SOLUTION INTRAVENOUS
Refills: 0 | Status: DISCONTINUED | OUTPATIENT
Start: 2025-06-03 | End: 2025-06-08

## 2025-06-03 RX ORDER — DAPTOMYCIN 500 MG/10ML
INJECTION, POWDER, LYOPHILIZED, FOR SOLUTION INTRAVENOUS
Refills: 0 | Status: DISCONTINUED | OUTPATIENT
Start: 2025-06-03 | End: 2025-06-03

## 2025-06-03 RX ADMIN — Medication 1 APPLICATION(S): at 17:09

## 2025-06-03 RX ADMIN — DAPTOMYCIN 124 MILLIGRAM(S): 500 INJECTION, POWDER, LYOPHILIZED, FOR SOLUTION INTRAVENOUS at 01:34

## 2025-06-03 RX ADMIN — Medication 500 MICROGRAM(S): at 05:53

## 2025-06-03 RX ADMIN — ERTAPENEM SODIUM 100 MILLIGRAM(S): 1 INJECTION, POWDER, LYOPHILIZED, FOR SOLUTION INTRAMUSCULAR; INTRAVENOUS at 17:12

## 2025-06-03 RX ADMIN — IPRATROPIUM BROMIDE AND ALBUTEROL SULFATE 3 MILLILITER(S): .5; 2.5 SOLUTION RESPIRATORY (INHALATION) at 10:20

## 2025-06-03 RX ADMIN — MUPIROCIN CALCIUM 1 APPLICATION(S): 20 CREAM TOPICAL at 17:07

## 2025-06-03 RX ADMIN — Medication 1000 MILLIGRAM(S): at 22:48

## 2025-06-03 RX ADMIN — IPRATROPIUM BROMIDE AND ALBUTEROL SULFATE 3 MILLILITER(S): .5; 2.5 SOLUTION RESPIRATORY (INHALATION) at 21:46

## 2025-06-03 RX ADMIN — Medication 400 MILLIGRAM(S): at 21:40

## 2025-06-03 RX ADMIN — FLUTICASONE PROPIONATE 1 SPRAY(S): 50 SPRAY, METERED NASAL at 17:07

## 2025-06-03 RX ADMIN — PREDNISONE 10 MILLIGRAM(S): 20 TABLET ORAL at 05:53

## 2025-06-03 RX ADMIN — Medication 40 MILLIGRAM(S): at 05:53

## 2025-06-03 RX ADMIN — Medication 40 MILLIEQUIVALENT(S): at 22:24

## 2025-06-03 RX ADMIN — Medication 500 MICROGRAM(S): at 01:37

## 2025-06-03 RX ADMIN — DAPTOMYCIN 124 MILLIGRAM(S): 500 INJECTION, POWDER, LYOPHILIZED, FOR SOLUTION INTRAVENOUS at 22:53

## 2025-06-03 RX ADMIN — OXYCODONE HYDROCHLORIDE 10 MILLIGRAM(S): 30 TABLET ORAL at 18:00

## 2025-06-03 RX ADMIN — Medication 40 MILLIEQUIVALENT(S): at 17:06

## 2025-06-03 RX ADMIN — OXYCODONE HYDROCHLORIDE 10 MILLIGRAM(S): 30 TABLET ORAL at 17:06

## 2025-06-03 NOTE — CONSULT NOTE ADULT - ASSESSMENT
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 Allergy consulted for sensitivity to adhesive tape    Recommendations:      44 year old female with history of chronic hypoxic respiratory failure, Group 1 PAH on Treprostinil infusion, HF s/p PPM dual chamber (2016), PE (2017) on DOAC, SVT s/p ablation (5/2020), asthma, and recurrent abdominal wall abscesses. Allergy consulted for sensitivity to adhesive tape    Recommendations:   - Can look into less sensitive options for adhesive sensitivity including [id number], Mepitac (transparent dressing), Mepilex/Mepitel/Safetac by Juan Hypafix (fabric tape)   recommend looking into less sensitive options for adhesive sensitivity including: [id number], mepitac, molnylcke, hypafix 44 year old female with history of chronic hypoxic respiratory failure, Group 1 PAH on Treprostinil infusion, HF s/p PPM dual chamber (2016), PE (2017) on DOAC, SVT s/p ablation (5/2020), asthma, and recurrent abdominal wall abscesses. Allergy consulted for sensitivity to adhesive tape.    CONTACT DERMATITIS TO ADHESIVE TAPE    - This should not preclude her from obtaining lung transplant for her deteriorating disease. To better control symptoms, would recommend triamcinolone cream 0.1% to place around tegaderm, and start Zyrtec daily to control pruritus around site of tapes. Please look into less sensitive options for adhesive sensitivity including [id number], Mepitac (transparent dressing), Mepilex/Mepitel/Safetac by Juan, Hypafix (fabric tape)  44 year old female with history of chronic hypoxic respiratory failure, Group 1 PAH on Treprostinil infusion, HF s/p PPM dual chamber (2016), PE (2017) on DOAC, SVT s/p ablation (5/2020), asthma, and recurrent abscesses (abdomen/LUE). Allergy consulted for sensitivity to adhesive tape.    CONTACT DERMATITIS TO ADHESIVE TAPE    - This should not preclude her from obtaining lung transplant for her deteriorating disease. To better control symptoms, would recommend triamcinolone cream 0.1% to place around tegaderm, and start Zyrtec daily to control pruritus around site of tapes. Please look into less sensitive options for adhesive sensitivity including [id number], Mepitac (transparent dressing), Mepilex/Mepitel/Safetac by Juan, Hypafix (fabric tape)  44 year old female with history of chronic hypoxic respiratory failure, Group 1 PAH on Treprostinil infusion, HF s/p PPM dual chamber (2016), PE (2017) on DOAC, SVT s/p ablation (5/2020), asthma, and 2 skin infections/abscess in the areas of prior contact dermatitis). Allergy consulted for sensitivity to adhesive tape.    CONTACT DERMATITIS TO ADHESIVE TAPE    - This should not preclude her from obtaining lung transplant for her deteriorating disease. To better control symptoms, would recommend triamcinolone cream 0.1% to place around tegaderm, and start Zyrtec daily to control pruritus around site of tapes. Please look into less irritating options for adhesive sensitivity including [id number], Mepitac (transparent dressing), Mepilex/Mepitel/Safetac by Juan, Hypafix (fabric tape)

## 2025-06-03 NOTE — CONSULT NOTE ADULT - ATTENDING COMMENTS
Briefly this is a 44 year old woman with chronic hypoxic respiratory failure, and grade 1 pulmonary arterial HTN, on lung transplant wait list, HF s/p PPM dual chamber (2016), PE (2017) on DOAC, SVT s/p ablation (5/2020), asthma, and 2 skin infections/abscess in the areas of prior contact dermatitis. patient has adhesive sensitivity resulting in erythema, pruritus, and on 2 occasions, skin infections presumably due to aggressive scratching.  As this patient will require many more adhesives in the future to secure critical lines, especially in the setting of possible lung transplant, recommend finding an alternative (see above for possibilities) dressing that she may tolerate.  Do not suspect that her skin infections are due to a defect in her immune system, rather, they are probably a result of the scratching in the setting of contact dermatitis. If we can provide her with an alternative dressing we can minimize chances of infection that might otherwise compromise her transplant candidacy.

## 2025-06-03 NOTE — PROVIDER CONTACT NOTE (CRITICAL VALUE NOTIFICATION) - TEST AND RESULT REPORTED:
Preliminary results: Positive blood cultures from 5/31 - Growth in Anaerobic bottle - Gram positive cocci in clusters
Final culture results
Preliminary results: Positive blood cultures from 5/31 - Growth in Anaerobic bottle - Gram positive cocci in clusters

## 2025-06-03 NOTE — PROVIDER CONTACT NOTE (CRITICAL VALUE NOTIFICATION) - ACTION/TREATMENT ORDERED:
Provider notified and aware. No new interventions at this time.
Patient currently in OR
Provider notified and aware. No new interventions at this time.

## 2025-06-03 NOTE — PRE-ANESTHESIA EVALUATION ADULT - NSRADCARDRESULTSFT_GEN_ALL_CORE
< from: MYRIAM W or WO Ultrasound Enhancing Agent (04.16.25 @ 16:32) >    CONCLUSIONS:      1. Left ventricular wall thickness is normal. There is paradoxical septal motion consistent with right ventricular volume overload. Left ventricular systolic function is normal with an ejection fraction visually estimated at 60 to 65 %. There are no regional wall motion abnormalities seen.   2. Mildly enlarged right ventricular cavity size and mildly reduced right ventricular systolic function.   3. Normal left and right atrial size.   4. Moderate pulmonic regurgitation.   5. Severely dilated main pulmonary artery.   6. Unspecified wire seen in right heart is visualized.   7. Structurally normal pulmonic valve with normal leaflet excursion.   8. Mild focal atheroma in the visualized portions of the descending aorta.    < end of copied text >

## 2025-06-03 NOTE — PROVIDER CONTACT NOTE (CRITICAL VALUE NOTIFICATION) - SITUATION
Preliminary results: Positive blood cultures from 5/31 - Growth in Anaerobic bottle - Gram positive cocci in clusters
5/31 blood culture set #1: +MRSA anaerobic bottle  5/31 blood culture set #2: +MRSA in both bottles  5/30 wound culture: +MRSA and numerous klebsiella pneumoniae
Preliminary results: Positive blood cultures from 5/31 - Growth in Anaerobic bottle - Gram positive cocci in clusters

## 2025-06-03 NOTE — PROGRESS NOTE ADULT - PROBLEM SELECTOR PLAN 1
Has a history of abscesses notably abdominal (grown MRSA and pseudomonas in the past). Current location LUE.   -s/p I&D, f/u culture - MRSA and ESBL Klebsiella growing.   >    > 6/1 : blood cx + GPC, MRSA pcr pos > s/p Ceftriaxone per ID recs. -repeat blood cx x2 from 6/2 - NGTD. Check TTE pending. Check LUE CT with IV contrast pending.   -Abx now dapto and ertapenem.   - on Dapto, s/p Azithro per ID  - F/U ID team for further recs  -MRSA bacteremia. F/u repeat blood cultures - 6/2 cultures NGTD. -EP for PPM removal today. -MYRIAM intraop per EP.   -tele.

## 2025-06-03 NOTE — CONSULT NOTE ADULT - SUBJECTIVE AND OBJECTIVE BOX
HISTORY OF PRESENT ILLNESS:  44 year old female with history of chronic hypoxic respiratory failure, Group 1 PAH on Treprostinil infusion, HF s/p PPM dual chamber (2016), PE (2017) on DOAC, SVT s/p ablation (5/2020), asthma, and recurrent abdominal wall abscesses. Allergy consulted for possible reaction to adhesive tape    Of note, allergy and immunology was consulted in April for penicillin allergy and vancomycin-related infusion reaction causing redness. It seems that she underwent graded amoxicillin challenge.     She reports hives from adhesives at contact sites.     MEDICATIONS  (STANDING):  chlorhexidine 2% Cloths 1 Application(s) Topical daily  DAPTOmycin IVPB      DAPTOmycin IVPB 600 milliGRAM(s) IV Intermittent every 24 hours  ertapenem  IVPB 1000 milliGRAM(s) IV Intermittent every 24 hours  fluticasone propionate 50 MICROgram(s)/spray Nasal Spray 1 Spray(s) Both Nostrils two times a day  heparin   Injectable 5000 Unit(s) SubCutaneous every 8 hours  ipratropium    for Nebulization 500 MICROGram(s) Nebulizer every 6 hours  mupirocin 2% Nasal 1 Application(s) Both Nostrils two times a day  pantoprazole    Tablet 40 milliGRAM(s) Oral before breakfast  potassium chloride    Tablet ER 20 milliEquivalent(s) Oral once  Treprostinil Continuous SubQ Infusion 1 Dose(s) 1 Dose(s) SubCutaneous Continuous Pump    MEDICATIONS  (PRN):  oxycodone    5 mG/acetaminophen 325 mG 2 Tablet(s) Oral every 6 hours PRN Moderate Pain (4 - 6)    Allergies    vancomycin (Rash)  adhesives (Rash)    PAST MEDICAL & SURGICAL HISTORY:  Anemia  Pulmonary hypertension  Asthma  On home O2 nightly at 2L via NC  PE (pulmonary thromboembolism)  on Xarelto 10 mg daily  Sinusitis  Right heart failure  H/O pulmonary hypertension  Pulmonary embolism  History of tachycardia  On supplemental oxygen by nasal cannula  O2 @ 2L  Pacemaker  Right atrial thrombus  Hypotension  Pacemaker  History of pacemaker  12/19 - [employer] model Ingevity 4469        FAMILY HISTORY:  Family history of diabetes mellitus  in brother      SOCIAL HISTORY:    REVIEW OF SYSTEMS  Please see above    Vital Signs Last 24 Hrs  T(C): 36.6 (03 Jun 2025 10:25), Max: 36.6 (02 Jun 2025 11:20)  T(F): 97.9 (03 Jun 2025 10:12), Max: 97.9 (03 Jun 2025 05:11)  HR: 98 (03 Jun 2025 10:25) (87 - 98)  BP: 113/71 (03 Jun 2025 10:25) (97/60 - 113/71)  BP(mean): 86 (03 Jun 2025 10:25) (86 - 86)  RR: 16 (03 Jun 2025 10:25) (16 - 18)  SpO2: 96% (03 Jun 2025 10:25) (95% - 100%)    Parameters below as of 03 Jun 2025 10:25    O2 Flow (L/min): 2      PHYSICAL EXAM:  GENERAL: NAD, resting comfortably in bed  HEAD:  Atraumatic, normocephalic  EYES: EOMI, PERRLA, conjunctiva and sclera clear  NECK: Supple  CHEST/LUNG: crackles B/L, good inspiratory effort  HEART: RRR. +S1/S2  ABDOMEN: Soft, nondistended, nontender to palpation  EXTREMITIES: No clubbing, cyanosis, or edema  PSYCH: A&O x3  NEUROLOGY: Non-focal, motor & sensory grossly intact  SKIN: Warm & dry, no rashes or lesions    LABS:                        11.6   9.84  )-----------( 344      ( 03 Jun 2025 04:37 )             36.7     06-03    137  |  103  |  11  ----------------------------<  88  3.3[L]   |  23  |  0.72    Ca    8.2[L]      03 Jun 2025 04:37      PT/INR - ( 03 Jun 2025 04:37 )   PT: 12.0 sec;   INR: 1.05 ratio      Urinalysis Basic - ( 03 Jun 2025 04:37 )    Color: x / Appearance: x / SG: x / pH: x  Gluc: 88 mg/dL / Ketone: x  / Bili: x / Urobili: x   Blood: x / Protein: x / Nitrite: x   Leuk Esterase: x / RBC: x / WBC x   Sq Epi: x / Non Sq Epi: x / Bacteria: x   HISTORY OF PRESENT ILLNESS:  44 year old female with history of chronic hypoxic respiratory failure, Group 1 PAH on Treprostinil infusion, HF s/p PPM dual chamber (2016), PE (2017) on DOAC, SVT s/p ablation (5/2020), asthma, and recurrent abdominal wall abscesses. Allergy consulted for possible reaction to adhesive tape    Of note, allergy and immunology was consulted in April for penicillin allergy and vancomycin-related infusion reaction causing redness. It seems that she underwent graded amoxicillin challenge.     She reports hives from adhesives at contact sites. History from  - patient has had longstanding sensitivity to adhesives (clear tegaderms, etc). It takes a few days for irritation to occur, and about a week to resolve. They have tried using different types of tapes/paper tapes however they are efficient in keeping lines in place/clean. They have tried benadryl in the past that helps with the pruritus, but the irritation still occurs. She does not get blisters/open wounds at the site where the adhesive is. Steroid creams have helped before as well.     MEDICATIONS  (STANDING):  chlorhexidine 2% Cloths 1 Application(s) Topical daily  DAPTOmycin IVPB      DAPTOmycin IVPB 600 milliGRAM(s) IV Intermittent every 24 hours  ertapenem  IVPB 1000 milliGRAM(s) IV Intermittent every 24 hours  fluticasone propionate 50 MICROgram(s)/spray Nasal Spray 1 Spray(s) Both Nostrils two times a day  heparin   Injectable 5000 Unit(s) SubCutaneous every 8 hours  ipratropium    for Nebulization 500 MICROGram(s) Nebulizer every 6 hours  mupirocin 2% Nasal 1 Application(s) Both Nostrils two times a day  pantoprazole    Tablet 40 milliGRAM(s) Oral before breakfast  potassium chloride    Tablet ER 20 milliEquivalent(s) Oral once  Treprostinil Continuous SubQ Infusion 1 Dose(s) 1 Dose(s) SubCutaneous Continuous Pump    MEDICATIONS  (PRN):  oxycodone    5 mG/acetaminophen 325 mG 2 Tablet(s) Oral every 6 hours PRN Moderate Pain (4 - 6)    Allergies    vancomycin (Rash)  adhesives (Rash)    PAST MEDICAL & SURGICAL HISTORY:  Anemia  Pulmonary hypertension  Asthma  On home O2 nightly at 2L via NC  PE (pulmonary thromboembolism)  on Xarelto 10 mg daily  Sinusitis  Right heart failure  H/O pulmonary hypertension  Pulmonary embolism  History of tachycardia  On supplemental oxygen by nasal cannula  O2 @ 2L  Pacemaker  Right atrial thrombus  Hypotension  Pacemaker  History of pacemaker  12/19 - [place] [employer] model Ingevity 4447        FAMILY HISTORY:  Family history of diabetes mellitus  in brother      SOCIAL HISTORY:    REVIEW OF SYSTEMS  Please see above    Vital Signs Last 24 Hrs  T(C): 36.6 (03 Jun 2025 10:25), Max: 36.6 (02 Jun 2025 11:20)  T(F): 97.9 (03 Jun 2025 10:12), Max: 97.9 (03 Jun 2025 05:11)  HR: 98 (03 Jun 2025 10:25) (87 - 98)  BP: 113/71 (03 Jun 2025 10:25) (97/60 - 113/71)  BP(mean): 86 (03 Jun 2025 10:25) (86 - 86)  RR: 16 (03 Jun 2025 10:25) (16 - 18)  SpO2: 96% (03 Jun 2025 10:25) (95% - 100%)    Parameters below as of 03 Jun 2025 10:25    O2 Flow (L/min): 2      PHYSICAL EXAM:  GENERAL: NAD, resting comfortably in bed  HEAD:  Atraumatic, normocephalic  EYES: EOMI, PERRLA, conjunctiva and sclera clear  NECK: Supple  CHEST/LUNG: crackles B/L, good inspiratory effort  HEART: RRR. +S1/S2  ABDOMEN: Soft, nondistended, nontender to palpation  EXTREMITIES: No clubbing, cyanosis, or edema  PSYCH: A&O x3  NEUROLOGY: Non-focal, motor & sensory grossly intact  SKIN: Warm & dry, no rashes or lesions    LABS:                        11.6   9.84  )-----------( 344      ( 03 Jun 2025 04:37 )             36.7     06-03    137  |  103  |  11  ----------------------------<  88  3.3[L]   |  23  |  0.72    Ca    8.2[L]      03 Jun 2025 04:37      PT/INR - ( 03 Jun 2025 04:37 )   PT: 12.0 sec;   INR: 1.05 ratio      Urinalysis Basic - ( 03 Jun 2025 04:37 )    Color: x / Appearance: x / SG: x / pH: x  Gluc: 88 mg/dL / Ketone: x  / Bili: x / Urobili: x   Blood: x / Protein: x / Nitrite: x   Leuk Esterase: x / RBC: x / WBC x   Sq Epi: x / Non Sq Epi: x / Bacteria: x   HISTORY OF PRESENT ILLNESS:  44 year old female with history of chronic hypoxic respiratory failure, Group 1 PAH on Treprostinil infusion, HF s/p PPM dual chamber (2016), PE (2017) on DOAC, SVT s/p ablation (5/2020), asthma, and recurrent abdominal wall abscesses. Allergy consulted for possible reaction to adhesive tape in setting of history of multiple skin infections    Of note, allergy and immunology was consulted in April for penicillin allergy and vancomycin-related infusion reaction causing redness. It seems that she underwent graded amoxicillin challenge.     She reports hives from adhesives at contact sites. Patient has had reaction to adhesives for about two years (clear tegaderms, etc). It takes 2-3 days for irritation to occur, and about a week to resolve. They have tried using paper tapes; however, they are not efficient in keeping lines in place/clean. They have tried benadryl in the past that helps with the pruritus, but the irritation still occurs. She does not get blisters/open wounds at the site where the adhesive is, but team is concerned that she gets abscesses and skin infections where her port is placed as she may be itching too hard. Hydrocortisone does not help. She reports being itchy at the cardiac monitor sticker sites at this time as well.     MEDICATIONS  (STANDING):  chlorhexidine 2% Cloths 1 Application(s) Topical daily  DAPTOmycin IVPB      DAPTOmycin IVPB 600 milliGRAM(s) IV Intermittent every 24 hours  ertapenem  IVPB 1000 milliGRAM(s) IV Intermittent every 24 hours  fluticasone propionate 50 MICROgram(s)/spray Nasal Spray 1 Spray(s) Both Nostrils two times a day  heparin   Injectable 5000 Unit(s) SubCutaneous every 8 hours  ipratropium    for Nebulization 500 MICROGram(s) Nebulizer every 6 hours  mupirocin 2% Nasal 1 Application(s) Both Nostrils two times a day  pantoprazole    Tablet 40 milliGRAM(s) Oral before breakfast  potassium chloride    Tablet ER 20 milliEquivalent(s) Oral once  Treprostinil Continuous SubQ Infusion 1 Dose(s) 1 Dose(s) SubCutaneous Continuous Pump    MEDICATIONS  (PRN):  oxycodone    5 mG/acetaminophen 325 mG 2 Tablet(s) Oral every 6 hours PRN Moderate Pain (4 - 6)    Allergies    vancomycin (Rash)  adhesives (Rash)    PAST MEDICAL & SURGICAL HISTORY:  Anemia  Pulmonary hypertension  Asthma  On home O2 nightly at 2L via NC  PE (pulmonary thromboembolism)  on Xarelto 10 mg daily  Sinusitis  Right heart failure  H/O pulmonary hypertension  Pulmonary embolism  History of tachycardia  On supplemental oxygen by nasal cannula  O2 @ 2L  Pacemaker  Right atrial thrombus  Hypotension  Pacemaker  History of pacemaker  12/19 - [place] [employer] model Ingevity 4419        FAMILY HISTORY:  Family history of diabetes mellitus  in brother      SOCIAL HISTORY:    REVIEW OF SYSTEMS  Please see above    Vital Signs Last 24 Hrs  T(C): 36.6 (03 Jun 2025 10:25), Max: 36.6 (02 Jun 2025 11:20)  T(F): 97.9 (03 Jun 2025 10:12), Max: 97.9 (03 Jun 2025 05:11)  HR: 98 (03 Jun 2025 10:25) (87 - 98)  BP: 113/71 (03 Jun 2025 10:25) (97/60 - 113/71)  BP(mean): 86 (03 Jun 2025 10:25) (86 - 86)  RR: 16 (03 Jun 2025 10:25) (16 - 18)  SpO2: 96% (03 Jun 2025 10:25) (95% - 100%)    Parameters below as of 03 Jun 2025 10:25    O2 Flow (L/min): 2      PHYSICAL EXAM:  GENERAL: NAD, resting comfortably in bed  HEAD:  Atraumatic, normocephalic  EYES: EOMI, PERRLA, conjunctiva and sclera clear  NECK: Supple  CHEST/LUNG: crackles B/L, good inspiratory effort  HEART: RRR. +S1/S2  ABDOMEN: Soft, nondistended, nontender to palpation  EXTREMITIES: No clubbing, cyanosis, or edema  PSYCH: A&O x3  NEUROLOGY: Non-focal, motor & sensory grossly intact  SKIN: Warm & dry, REDNESS AROUND TEGARDERM PRESENT ON UNDER ARM WHERE LINE IS PLACED    LABS:                        11.6   9.84  )-----------( 344      ( 03 Jun 2025 04:37 )             36.7     06-03    137  |  103  |  11  ----------------------------<  88  3.3[L]   |  23  |  0.72    Ca    8.2[L]      03 Jun 2025 04:37      PT/INR - ( 03 Jun 2025 04:37 )   PT: 12.0 sec;   INR: 1.05 ratio      Urinalysis Basic - ( 03 Jun 2025 04:37 )    Color: x / Appearance: x / SG: x / pH: x  Gluc: 88 mg/dL / Ketone: x  / Bili: x / Urobili: x   Blood: x / Protein: x / Nitrite: x   Leuk Esterase: x / RBC: x / WBC x   Sq Epi: x / Non Sq Epi: x / Bacteria: x   HISTORY OF PRESENT ILLNESS:  44 year old female with history of chronic hypoxic respiratory failure, Group 1 PAH on Treprostinil infusion, HF s/p PPM dual chamber (2016), PE (2017) on DOAC, SVT s/p ablation (5/2020), asthma, and recurrent abscesses (abdomen/LUE). Allergy consulted for possible reaction to adhesive tape in setting of history of multiple skin infections    Of note, allergy and immunology was consulted in April for penicillin allergy and vancomycin-related infusion reaction causing redness. It seems that she underwent graded amoxicillin challenge.     She reports hives from adhesives at contact sites. Patient has had reaction to adhesives for about two years (clear tegaderms, etc). It takes 2-3 days for irritation to occur, and about a week to resolve. They have tried using paper tapes; however, they are not efficient in keeping lines in place/clean. They have tried benadryl in the past that helps with the pruritus, but the irritation still occurs. She does not get blisters/open wounds at the site where the adhesive is, but team is concerned that she gets abscesses and skin infections where her port is placed as she may be itching too hard. Hydrocortisone does not help. She reports being itchy at the cardiac monitor sticker sites at this time as well.     MEDICATIONS  (STANDING):  chlorhexidine 2% Cloths 1 Application(s) Topical daily  DAPTOmycin IVPB      DAPTOmycin IVPB 600 milliGRAM(s) IV Intermittent every 24 hours  ertapenem  IVPB 1000 milliGRAM(s) IV Intermittent every 24 hours  fluticasone propionate 50 MICROgram(s)/spray Nasal Spray 1 Spray(s) Both Nostrils two times a day  heparin   Injectable 5000 Unit(s) SubCutaneous every 8 hours  ipratropium    for Nebulization 500 MICROGram(s) Nebulizer every 6 hours  mupirocin 2% Nasal 1 Application(s) Both Nostrils two times a day  pantoprazole    Tablet 40 milliGRAM(s) Oral before breakfast  potassium chloride    Tablet ER 20 milliEquivalent(s) Oral once  Treprostinil Continuous SubQ Infusion 1 Dose(s) 1 Dose(s) SubCutaneous Continuous Pump    MEDICATIONS  (PRN):  oxycodone    5 mG/acetaminophen 325 mG 2 Tablet(s) Oral every 6 hours PRN Moderate Pain (4 - 6)    Allergies  vancomycin (Rash)  adhesives (Rash)    PAST MEDICAL & SURGICAL HISTORY:  Anemia  Pulmonary hypertension  Asthma  On home O2 nightly at 2L via NC  PE (pulmonary thromboembolism)  on Xarelto 10 mg daily  Sinusitis  Right heart failure  H/O pulmonary hypertension  Pulmonary embolism  History of tachycardia  On supplemental oxygen by nasal cannula  O2 @ 2L  Pacemaker  Right atrial thrombus  Hypotension  Pacemaker  History of pacemaker  12/19 - [place] model Ingevity 4427        FAMILY HISTORY:  Family history of diabetes mellitus  in brother      SOCIAL HISTORY:    REVIEW OF SYSTEMS  Please see above    Vital Signs Last 24 Hrs  T(C): 36.6 (03 Jun 2025 10:25), Max: 36.6 (02 Jun 2025 11:20)  T(F): 97.9 (03 Jun 2025 10:12), Max: 97.9 (03 Jun 2025 05:11)  HR: 98 (03 Jun 2025 10:25) (87 - 98)  BP: 113/71 (03 Jun 2025 10:25) (97/60 - 113/71)  BP(mean): 86 (03 Jun 2025 10:25) (86 - 86)  RR: 16 (03 Jun 2025 10:25) (16 - 18)  SpO2: 96% (03 Jun 2025 10:25) (95% - 100%)    Parameters below as of 03 Jun 2025 10:25    O2 Flow (L/min): 2      PHYSICAL EXAM:  GENERAL: NAD, resting comfortably in bed  HEAD:  Atraumatic, normocephalic  EYES: EOMI, PERRLA, conjunctiva and sclera clear  NECK: Supple  CHEST/LUNG: crackles B/L, good inspiratory effort  HEART: RRR. +S1/S2  ABDOMEN: Soft, nondistended, nontender to palpation  EXTREMITIES: No clubbing, cyanosis, or edema  PSYCH: A&O x3  NEUROLOGY: Non-focal, motor & sensory grossly intact  SKIN: Warm & dry, REDNESS AROUND TEGARDERM PRESENT ON UNDER ARM WHERE LINE IS PLACED    LABS:                        11.6   9.84  )-----------( 344      ( 03 Jun 2025 04:37 )             36.7     06-03    137  |  103  |  11  ----------------------------<  88  3.3[L]   |  23  |  0.72    Ca    8.2[L]      03 Jun 2025 04:37      PT/INR - ( 03 Jun 2025 04:37 )   PT: 12.0 sec;   INR: 1.05 ratio      Urinalysis Basic - ( 03 Jun 2025 04:37 )    Color: x / Appearance: x / SG: x / pH: x  Gluc: 88 mg/dL / Ketone: x  / Bili: x / Urobili: x   Blood: x / Protein: x / Nitrite: x   Leuk Esterase: x / RBC: x / WBC x   Sq Epi: x / Non Sq Epi: x / Bacteria: x   HISTORY OF PRESENT ILLNESS:  44 year old female with history of chronic hypoxic respiratory failure, Group 1 PAH on Treprostinil infusion, HF s/p PPM dual chamber (2016), PE (2017) on DOAC, SVT s/p ablation (5/2020), asthma, and recurrent abscesses (abdomen/LUE). Allergy consulted for possible reaction to adhesive tape in setting of history of multiple skin infections    Of note, allergy and immunology was consulted in April for penicillin allergy and vancomycin-related infusion reaction causing redness. It seems that she underwent graded amoxicillin challenge.     She reports irritation and itching from adhesives at contact sites. Patient has had reaction to adhesives for about two years (clear tegaderms, etc). It takes 2-3 days for irritation to occur, and about a week to resolve. They have tried using paper tapes; however, they are not efficient in keeping lines in place/clean. She has tried benadryl in the past that helps with the pruritus, but the irritation still occurs. She does not get blisters/open wounds at the site where the adhesive is, but team is concerned that she gets abscesses and skin infections where her port is placed as she may be scratching too hard. Hydrocortisone does not help. She reports being itchy at the cardiac monitor sticker sites at this time as well.     MEDICATIONS  (STANDING):  chlorhexidine 2% Cloths 1 Application(s) Topical daily  DAPTOmycin IVPB      DAPTOmycin IVPB 600 milliGRAM(s) IV Intermittent every 24 hours  ertapenem  IVPB 1000 milliGRAM(s) IV Intermittent every 24 hours  fluticasone propionate 50 MICROgram(s)/spray Nasal Spray 1 Spray(s) Both Nostrils two times a day  heparin   Injectable 5000 Unit(s) SubCutaneous every 8 hours  ipratropium    for Nebulization 500 MICROGram(s) Nebulizer every 6 hours  mupirocin 2% Nasal 1 Application(s) Both Nostrils two times a day  pantoprazole    Tablet 40 milliGRAM(s) Oral before breakfast  potassium chloride    Tablet ER 20 milliEquivalent(s) Oral once  Treprostinil Continuous SubQ Infusion 1 Dose(s) 1 Dose(s) SubCutaneous Continuous Pump    MEDICATIONS  (PRN):  oxycodone    5 mG/acetaminophen 325 mG 2 Tablet(s) Oral every 6 hours PRN Moderate Pain (4 - 6)    Allergies  vancomycin (Rash)  adhesives (Rash)    PAST MEDICAL & SURGICAL HISTORY:  Anemia  Pulmonary hypertension  Asthma  On home O2 nightly at 2L via NC  PE (pulmonary thromboembolism)  on Xarelto 10 mg daily  Sinusitis  Right heart failure  H/O pulmonary hypertension  Pulmonary embolism  History of tachycardia  On supplemental oxygen by nasal cannula  O2 @ 2L  Pacemaker  Right atrial thrombus  Hypotension  Pacemaker  History of pacemaker  12/19 - [place] [employer] model Ingevity 4478        FAMILY HISTORY:  Family history of diabetes mellitus  in brother      SOCIAL HISTORY:    REVIEW OF SYSTEMS  Please see above    Vital Signs Last 24 Hrs  T(C): 36.6 (03 Jun 2025 10:25), Max: 36.6 (02 Jun 2025 11:20)  T(F): 97.9 (03 Jun 2025 10:12), Max: 97.9 (03 Jun 2025 05:11)  HR: 98 (03 Jun 2025 10:25) (87 - 98)  BP: 113/71 (03 Jun 2025 10:25) (97/60 - 113/71)  BP(mean): 86 (03 Jun 2025 10:25) (86 - 86)  RR: 16 (03 Jun 2025 10:25) (16 - 18)  SpO2: 96% (03 Jun 2025 10:25) (95% - 100%)    Parameters below as of 03 Jun 2025 10:25    O2 Flow (L/min): 2      PHYSICAL EXAM:  GENERAL: NAD, resting comfortably in bed  HEAD:  Atraumatic, normocephalic  EYES: EOMI, PERRLA, conjunctiva and sclera clear  NECK: Supple  CHEST/LUNG: crackles B/L, good inspiratory effort  HEART: RRR. +S1/S2  ABDOMEN: Soft, nondistended, nontender to palpation  EXTREMITIES: No clubbing, cyanosis, or edema  PSYCH: A&O x3  NEUROLOGY: Non-focal, motor & sensory grossly intact  SKIN: Warm & dry, REDNESS AROUND TEGARDERM PRESENT ON UNDER ARM WHERE LINE IS PLACED    LABS:                        11.6   9.84  )-----------( 344      ( 03 Jun 2025 04:37 )             36.7     06-03    137  |  103  |  11  ----------------------------<  88  3.3[L]   |  23  |  0.72    Ca    8.2[L]      03 Jun 2025 04:37      PT/INR - ( 03 Jun 2025 04:37 )   PT: 12.0 sec;   INR: 1.05 ratio      Urinalysis Basic - ( 03 Jun 2025 04:37 )    Color: x / Appearance: x / SG: x / pH: x  Gluc: 88 mg/dL / Ketone: x  / Bili: x / Urobili: x   Blood: x / Protein: x / Nitrite: x   Leuk Esterase: x / RBC: x / WBC x   Sq Epi: x / Non Sq Epi: x / Bacteria: x

## 2025-06-03 NOTE — PROGRESS NOTE ADULT - PROBLEM SELECTOR PLAN 2
-off of xarelto, nifedipine, diuretics  -continue remodulin - f/u with pulm/ID if pump needs to be adjusted with recurrent skin infections. -They are looking into this outpatient.    -continue atrovent  -continue NC  -Transplant candidacy reviewed, currently evaluation closed until BMI closer to target in addition to recurrent infections more under control. Consider GLP-1 agonist upon DC.  - F/U transplant pulm team for further recs

## 2025-06-03 NOTE — PROGRESS NOTE ADULT - SUBJECTIVE AND OBJECTIVE BOX
Patient is a 44y old  Female who presents with a chief complaint of Abscess (03 Jun 2025 10:47)        SUBJECTIVE / OVERNIGHT EVENTS: Patient feels ok but just nervous prior to procedure today.       MEDICATIONS  (STANDING):  albuterol/ipratropium for Nebulization. 3 milliLiter(s) Nebulizer once  chlorhexidine 2% Cloths 1 Application(s) Topical daily  DAPTOmycin IVPB 600 milliGRAM(s) IV Intermittent <User Schedule>  ertapenem  IVPB 1000 milliGRAM(s) IV Intermittent every 24 hours  fluticasone propionate 50 MICROgram(s)/spray Nasal Spray 1 Spray(s) Both Nostrils two times a day  heparin   Injectable 5000 Unit(s) SubCutaneous every 8 hours  mupirocin 2% Nasal 1 Application(s) Both Nostrils two times a day  pantoprazole    Tablet 40 milliGRAM(s) Oral before breakfast  potassium chloride    Tablet ER 20 milliEquivalent(s) Oral once  potassium chloride    Tablet ER 40 milliEquivalent(s) Oral every 4 hours  predniSONE   Tablet 5 milliGRAM(s) Oral daily    MEDICATIONS  (PRN):  acetaminophen     Tablet .. 650 milliGRAM(s) Oral every 6 hours PRN Mild Pain (1 - 3), Moderate Pain (4 - 6)  oxyCODONE    IR 10 milliGRAM(s) Oral every 6 hours PRN Severe Pain (7 - 10)  oxyCODONE    IR 5 milliGRAM(s) Oral every 4 hours PRN Moderate Pain (4 - 6)      Vital Signs Last 24 Hrs  T(C): 36.4 (03 Jun 2025 16:08), Max: 36.6 (03 Jun 2025 00:56)  T(F): 97.5 (03 Jun 2025 16:08), Max: 97.9 (03 Jun 2025 05:11)  HR: 90 (03 Jun 2025 16:08) (87 - 102)  BP: 110/74 (03 Jun 2025 16:08) (97/65 - 115/67)  BP(mean): 86 (03 Jun 2025 10:25) (86 - 86)  RR: 18 (03 Jun 2025 16:08) (15 - 18)  SpO2: 95% (03 Jun 2025 16:08) (91% - 100%)    Parameters below as of 03 Jun 2025 16:08  Patient On (Oxygen Delivery Method): nasal cannula  O2 Flow (L/min): 2    CAPILLARY BLOOD GLUCOSE        I&O's Summary    02 Jun 2025 07:01  -  03 Jun 2025 07:00  --------------------------------------------------------  IN: 1180 mL / OUT: 0 mL / NET: 1180 mL    03 Jun 2025 07:01  -  03 Jun 2025 18:11  --------------------------------------------------------  IN: 0 mL / OUT: 300 mL / NET: -300 mL          PHYSICAL EXAM:   GENERAL: NAD, well-developed  HEAD:  Atraumatic, Normocephalic  EYES:   conjunctiva and sclera clear  NECK: Supple   CHEST/LUNG: Clear to auscultation bilaterally; No wheeze  HEART: S1S2 normal. Regular rate and rhythm; No murmurs, rubs, or gallops  ABDOMEN: Soft, Nontender, Nondistended; Bowel sounds present  EXTREMITIES:    No clubbing, cyanosis, or edema. LUE wrapped.   PSYCH/Neuro: AAOx3. Non-focal.   SKIN: LUE wrapped.       LABS:                        11.6   9.84  )-----------( 344      ( 03 Jun 2025 04:37 )             36.7     06-03    137  |  103  |  11  ----------------------------<  88  3.3[L]   |  23  |  0.72    Ca    8.2[L]      03 Jun 2025 04:37      PT/INR - ( 03 Jun 2025 04:37 )   PT: 12.0 sec;   INR: 1.05 ratio               Urinalysis Basic - ( 03 Jun 2025 04:37 )    Color: x / Appearance: x / SG: x / pH: x  Gluc: 88 mg/dL / Ketone: x  / Bili: x / Urobili: x   Blood: x / Protein: x / Nitrite: x   Leuk Esterase: x / RBC: x / WBC x   Sq Epi: x / Non Sq Epi: x / Bacteria: x          RADIOLOGY & ADDITIONAL TESTS:    Imaging Personally Reviewed:  Consultant(s) Notes Reviewed:  pulm, EP, ID  Care Discussed with Consultants/Other Providers:

## 2025-06-03 NOTE — PRE-ANESTHESIA EVALUATION ADULT - NSANTHPMHFT_GEN_ALL_CORE
45 yo F w/ PMHx of severe pHTN (on treprostinil SQ pump) currently on lung transplant list, Right HF (s/p PPM dual chamber 2016), chronic PE (dx 2017) no longer on DOAC, SVT (s/p ablation 05/2020), asthma, with recurrent abscesses (abdominal wall, UE, most recently MRSA) p/w upper extremity abscess. Now w/ MRSA bacteremia, presenting now for PPM lead and device extraction.    Denies active CP/SOB/Orthopnea  Denies hx of MI 43 yo F w/ PMHx of asthma, severe pHTN (on treprostinil SQ pump) currently on lung transplant list on home O2 (2 L/min), Right HF (s/p PPM dual chamber 2016), chronic PE (dx 2017) no longer on DOAC, SVT (s/p ablation 05/2020), asthma, with recurrent abscesses (abdominal wall, UE, most recently MRSA) p/w upper extremity abscess. Now w/ MRSA bacteremia, presenting now for PPM lead and device extraction.    Denies active CP/SOB/Orthopnea  Denies hx of MI

## 2025-06-04 LAB
ANION GAP SERPL CALC-SCNC: 12 MMOL/L — SIGNIFICANT CHANGE UP (ref 5–17)
BUN SERPL-MCNC: 7 MG/DL — SIGNIFICANT CHANGE UP (ref 7–23)
CALCIUM SERPL-MCNC: 8.5 MG/DL — SIGNIFICANT CHANGE UP (ref 8.4–10.5)
CHLORIDE SERPL-SCNC: 104 MMOL/L — SIGNIFICANT CHANGE UP (ref 96–108)
CO2 SERPL-SCNC: 23 MMOL/L — SIGNIFICANT CHANGE UP (ref 22–31)
CREAT SERPL-MCNC: 0.77 MG/DL — SIGNIFICANT CHANGE UP (ref 0.5–1.3)
EGFR: 97 ML/MIN/1.73M2 — SIGNIFICANT CHANGE UP
EGFR: 97 ML/MIN/1.73M2 — SIGNIFICANT CHANGE UP
GLUCOSE SERPL-MCNC: 97 MG/DL — SIGNIFICANT CHANGE UP (ref 70–99)
POTASSIUM SERPL-MCNC: 4.4 MMOL/L — SIGNIFICANT CHANGE UP (ref 3.5–5.3)
POTASSIUM SERPL-SCNC: 4.4 MMOL/L — SIGNIFICANT CHANGE UP (ref 3.5–5.3)
SODIUM SERPL-SCNC: 139 MMOL/L — SIGNIFICANT CHANGE UP (ref 135–145)

## 2025-06-04 PROCEDURE — 99232 SBSQ HOSP IP/OBS MODERATE 35: CPT

## 2025-06-04 PROCEDURE — 99233 SBSQ HOSP IP/OBS HIGH 50: CPT

## 2025-06-04 RX ORDER — MONTELUKAST SODIUM 10 MG/1
1 TABLET ORAL
Refills: 0 | DISCHARGE

## 2025-06-04 RX ORDER — HYDROMORPHONE/SOD CHLOR,ISO/PF 2 MG/10 ML
0.5 SYRINGE (ML) INJECTION EVERY 6 HOURS
Refills: 0 | Status: DISCONTINUED | OUTPATIENT
Start: 2025-06-04 | End: 2025-06-05

## 2025-06-04 RX ORDER — FLUTICASONE PROPIONATE 50 UG/1
1 SPRAY, METERED NASAL
Refills: 0 | DISCHARGE

## 2025-06-04 RX ORDER — HYDROMORPHONE/SOD CHLOR,ISO/PF 2 MG/10 ML
0.5 SYRINGE (ML) INJECTION ONCE
Refills: 0 | Status: DISCONTINUED | OUTPATIENT
Start: 2025-06-04 | End: 2025-06-04

## 2025-06-04 RX ORDER — SEMAGLUTIDE 1 MG/.5ML
1 INJECTION, SOLUTION SUBCUTANEOUS
Refills: 0 | DISCHARGE

## 2025-06-04 RX ORDER — BENRALIZUMAB 30 MG/ML
30 INJECTION, SOLUTION SUBCUTANEOUS
Refills: 0 | DISCHARGE

## 2025-06-04 RX ORDER — KETOROLAC TROMETHAMINE 30 MG/ML
15 INJECTION, SOLUTION INTRAMUSCULAR; INTRAVENOUS ONCE
Refills: 0 | Status: DISCONTINUED | OUTPATIENT
Start: 2025-06-04 | End: 2025-06-04

## 2025-06-04 RX ORDER — TRIAMCINOLONE ACETONIDE 1 MG/G
1 CREAM TOPICAL
Refills: 0 | Status: DISCONTINUED | OUTPATIENT
Start: 2025-06-04 | End: 2025-06-11

## 2025-06-04 RX ADMIN — Medication 500 MICROGRAM(S): at 10:17

## 2025-06-04 RX ADMIN — Medication 0.5 MILLIGRAM(S): at 10:17

## 2025-06-04 RX ADMIN — Medication 5 MILLIGRAM(S): at 16:29

## 2025-06-04 RX ADMIN — Medication 0.5 MILLIGRAM(S): at 16:29

## 2025-06-04 RX ADMIN — Medication 1 APPLICATION(S): at 10:00

## 2025-06-04 RX ADMIN — FLUTICASONE PROPIONATE 1 SPRAY(S): 50 SPRAY, METERED NASAL at 18:42

## 2025-06-04 RX ADMIN — Medication 0.5 MILLIGRAM(S): at 22:33

## 2025-06-04 RX ADMIN — TRIAMCINOLONE ACETONIDE 1 APPLICATION(S): 1 CREAM TOPICAL at 22:33

## 2025-06-04 RX ADMIN — Medication 0.5 MILLIGRAM(S): at 16:44

## 2025-06-04 RX ADMIN — KETOROLAC TROMETHAMINE 15 MILLIGRAM(S): 30 INJECTION, SOLUTION INTRAMUSCULAR; INTRAVENOUS at 03:45

## 2025-06-04 RX ADMIN — Medication 0.5 MILLIGRAM(S): at 10:33

## 2025-06-04 RX ADMIN — ERTAPENEM SODIUM 100 MILLIGRAM(S): 1 INJECTION, POWDER, LYOPHILIZED, FOR SOLUTION INTRAMUSCULAR; INTRAVENOUS at 18:42

## 2025-06-04 RX ADMIN — FLUTICASONE PROPIONATE 1 SPRAY(S): 50 SPRAY, METERED NASAL at 06:36

## 2025-06-04 RX ADMIN — KETOROLAC TROMETHAMINE 15 MILLIGRAM(S): 30 INJECTION, SOLUTION INTRAMUSCULAR; INTRAVENOUS at 02:44

## 2025-06-04 RX ADMIN — PREDNISONE 5 MILLIGRAM(S): 20 TABLET ORAL at 06:36

## 2025-06-04 RX ADMIN — Medication 40 MILLIGRAM(S): at 06:36

## 2025-06-04 NOTE — PROGRESS NOTE ADULT - SUBJECTIVE AND OBJECTIVE BOX
Lung Transplant Progress Note  =====================================================  24 hour events: Pt seen and examined bedside, breathing comfortably on room air    T(C): 36.7 (06-04-25 @ 04:51), Max: 36.7 (06-04-25 @ 04:51)  HR: 97 (06-04-25 @ 08:00) (90 - 108)  BP: 105/67 (06-04-25 @ 04:51) (105/67 - 115/70)  RR: 18 (06-04-25 @ 08:00) (15 - 19)  SpO2: 94% (06-04-25 @ 08:00) (91% - 96%)    PAST MEDICAL & SURGICAL HISTORY:  Anemia      Pulmonary hypertension      Asthma  On home O2 nightly at 2L via NC      PE (pulmonary thromboembolism)  on Xarelto 10 mg daily      Sinusitis      Right heart failure      H/O pulmonary hypertension      Pulmonary embolism      History of tachycardia      On supplemental oxygen by nasal cannula  O2 @ 2L      Pacemaker      Right atrial thrombus      Hypotension      Pacemaker      History of pacemaker  12/19 - [employer] model Ingevity [id number]        Home Meds: Home Medications:  acetaminophen 500 mg oral tablet: 2 tab(s) orally every 6 hours As needed Mild Pain (1 - 3) (24 Apr 2025 11:22)  Atrovent 18 mcg/inh inhalation aerosol: 1 inhaled 4 times a day as needed for  shortness of breath and/or wheezing (02 Apr 2025 16:24)  omeprazole 40 mg oral delayed release capsule: 1 cap(s) orally once a day (02 Apr 2025 16:24)  Remodulin 5 mg/mL injectable solution: 1 application injectable once a day via her own SQ PUMP (02 Apr 2025 16:24)    Allergies: Allergies    vancomycin (Rash)  adhesives (Rash)    Intolerances        REVIEW OF SYSTEMS    [x] A ten-point review of systems was otherwise negative except as noted.  [ ] Due to altered mental status/intubation, subjective information were not able to be obtained from the patient. History was obtained, to the extent possible, from review of the chart and collateral sources of information.      CURRENT MEDICATIONS:   Neurologic Medications  acetaminophen     Tablet .. 650 milliGRAM(s) Oral every 6 hours PRN Mild Pain (1 - 3), Moderate Pain (4 - 6)  oxyCODONE    IR 10 milliGRAM(s) Oral every 6 hours PRN Severe Pain (7 - 10)  oxyCODONE    IR 5 milliGRAM(s) Oral every 4 hours PRN Moderate Pain (4 - 6)  oxyCODONE    IR 5 milliGRAM(s) Oral once    Respiratory Medications    Cardiovascular Medications    Gastrointestinal Medications  pantoprazole    Tablet 40 milliGRAM(s) Oral before breakfast  potassium chloride    Tablet ER 20 milliEquivalent(s) Oral once    Genitourinary Medications    Hematologic/Oncologic Medications    Antimicrobial/Immunologic Medications  DAPTOmycin IVPB 600 milliGRAM(s) IV Intermittent <User Schedule>  ertapenem  IVPB 1000 milliGRAM(s) IV Intermittent every 24 hours    Endocrine/Metabolic Medications  predniSONE   Tablet 5 milliGRAM(s) Oral daily    Topical/Other Medications  chlorhexidine 2% Cloths 1 Application(s) Topical daily  fluticasone propionate 50 MICROgram(s)/spray Nasal Spray 1 Spray(s) Both Nostrils two times a day  mupirocin 2% Nasal 1 Application(s) Both Nostrils two times a day      INS/OUTS (last 24 hours):  I&O's Summary    03 Jun 2025 07:01  -  04 Jun 2025 07:00  --------------------------------------------------------  IN: 0 mL / OUT: 300 mL / NET: -300 mL      --------------------------------------------------------------------------------------    PHYSICAL EXAM:  GENERAL: NAD, resting comfortably in bed  HEAD:  Atraumatic, normocephalic  EYES: EOMI, conjunctiva and sclera clear  NECK: Supple  CHEST/LUNG: CTA B/L, good inspiratory effort  HEART: RRR. +S1/S2  ABDOMEN: Soft, nondistended, nontender to palpation  EXTREMITIES: No clubbing, cyanosis, or edema  PSYCH: A&O x3  NEUROLOGY: Non-focal, motor & sensory grossly intact  SKIN: Warm & dry, no rashes or lesions    LABS:                        11.6   9.84  )-----------( 344      ( 03 Jun 2025 04:37 )             36.7     06-04    139  |  104  |  7   ----------------------------<  97  4.4   |  23  |  0.77    Ca    8.5      04 Jun 2025 06:07      PT/INR - ( 03 Jun 2025 04:37 )   PT: 12.0 sec;   INR: 1.05 ratio           Urinalysis Basic - ( 04 Jun 2025 06:07 )    Color: x / Appearance: x / SG: x / pH: x  Gluc: 97 mg/dL / Ketone: x  / Bili: x / Urobili: x   Blood: x / Protein: x / Nitrite: x   Leuk Esterase: x / RBC: x / WBC x   Sq Epi: x / Non Sq Epi: x / Bacteria: x        Culture - Blood (collected 02 Jun 2025 07:16)  Source: Blood Blood-Peripheral  Preliminary Report (03 Jun 2025 12:01):    No growth at 24 hours    Culture - Blood (collected 02 Jun 2025 07:16)  Source: Blood Blood-Peripheral  Preliminary Report (03 Jun 2025 12:01):    No growth at 24 hours      CAPILLARY BLOOD GLUCOSE          RADIOLOGY:

## 2025-06-04 NOTE — PROGRESS NOTE ADULT - SUBJECTIVE AND OBJECTIVE BOX
24H hour events: No acute events    MEDICATIONS:  heparin   Injectable 5000 Unit(s) SubCutaneous every 8 hours - not given  DAPTOmycin IVPB 600 milliGRAM(s) IV Intermittent <User Schedule>  ertapenem  IVPB 1000 milliGRAM(s) IV Intermittent every 24 hours  acetaminophen     Tablet .. 650 milliGRAM(s) Oral every 6 hours PRN  oxyCODONE    IR 10 milliGRAM(s) Oral every 6 hours PRN  oxyCODONE    IR 5 milliGRAM(s) Oral every 4 hours PRN  oxyCODONE    IR 5 milliGRAM(s) Oral once  pantoprazole    Tablet 40 milliGRAM(s) Oral before breakfast  predniSONE   Tablet 5 milliGRAM(s) Oral daily  chlorhexidine 2% Cloths 1 Application(s) Topical daily  fluticasone propionate 50 MICROgram(s)/spray Nasal Spray 1 Spray(s) Both Nostrils two times a day  mupirocin 2% Nasal 1 Application(s) Both Nostrils two times a day  potassium chloride    Tablet ER 20 milliEquivalent(s) Oral once      REVIEW OF SYSTEMS:  Complete 12 point ROS negative.    PHYSICAL EXAM:  T(C): 36.7 (06-04-25 @ 04:51), Max: 36.7 (06-04-25 @ 04:51)  HR: 108 (06-04-25 @ 04:51) (90 - 108)  BP: 105/67 (06-04-25 @ 04:51) (104/74 - 115/70)  RR: 19 (06-04-25 @ 04:51) (15 - 19)  SpO2: 91% (06-04-25 @ 04:51) (91% - 100%)  Wt(kg): --  I&O's Summary    03 Jun 2025 07:01  -  04 Jun 2025 07:00  --------------------------------------------------------  IN: 0 mL / OUT: 300 mL / NET: -300 mL        Appearance: Normal	  HEENT:  PERRL, EOMI	  Cardiovascular: Normal S1 S2, No JVD, No murmurs, No edema  Respiratory: Lungs clear to auscultation	  Psychiatry: A & O x 3, Mood & affect appropriate  Gastrointestinal:  Soft, Non-tender, + BS	  Skin: Left chest incision C/D/I, No rashes, No ecchymoses, No hematoma	  Neurologic: Non-focal  Extremities: Right groin C/D/I, No hematoma  Vascular: Peripheral pulses palpable 2+ bilaterally        LABS:	 	    CBC Full  -  ( 03 Jun 2025 04:37 )  WBC Count : 9.84 K/uL  Hemoglobin : 11.6 g/dL  Hematocrit : 36.7 %  Platelet Count - Automated : 344 K/uL  Mean Cell Volume : 72.5 fl  Mean Cell Hemoglobin : 22.9 pg  Mean Cell Hemoglobin Concentration : 31.6 g/dL  Auto Neutrophil # : x  Auto Lymphocyte # : x  Auto Monocyte # : x  Auto Eosinophil # : x  Auto Basophil # : x  Auto Neutrophil % : x  Auto Lymphocyte % : x  Auto Monocyte % : x  Auto Eosinophil % : x  Auto Basophil % : x    06-04    139  |  104  |  7   ----------------------------<  97  4.4   |  23  |  0.77  06-03    137  |  103  |  11  ----------------------------<  88  3.3[L]   |  23  |  0.72    Ca    8.5      04 Jun 2025 06:07  Ca    8.2[L]      03 Jun 2025 04:37      TELEMETRY: SR 90-100s	      < from: MYRIAM W or WO Ultrasound Enhancing Agent (04.16.25 @ 16:32) >  TRANSESOPHAGEAL ECHOCARDIOGRAM REPORT  ________________________________________________________________________________                                      _______       Pt. Name:       ROXI MARIA Study Date:    4/16/2025  MRN:            [id number]       YOB: 1980  Accession #:    [id number]        Age:           44 years  Account#:       [id number]     Gender:        F  Heart Rate:                      Height:        65.00 in (165.10 cm)  Rhythm:Weight:        205.00 lb (92.99 kg)  Blood Pressure: /                BSA/BMI:       2.00 m² / 34.11 kg/m²  ________________________________________________________________________________________  Referring Physician:    [id number] Brandon Rodrigez  Interpreting Physician: Kye Prasad  Primary Sonographer:    YONNY    CPT:               ECHO TRANSESOPH W/O CON - 86089.m;DOPPLER ECHO COMP W SPECT -                     38554.m;DOPPL ECHO COLOR FLOW - 70879.m  Indication(s):     Nonrheumatic pulmonary valve insufficiency - I37.1  Procedure:         Transesophageal echocardiogram performed with 2D, M-mode and                     complete spectral and color flow Doppler.  Ordering Location: [facility]  Admission Status:  Inpatient  Study Information: Image quality for this study is adequate.    _______________________________________________________________________________________     CONCLUSIONS:      1. Left ventricular wall thickness is normal. There is paradoxical septal motion consistent with right ventricular volume overload. Left ventricular systolic function is normal with an ejection fraction visually estimated at 60 to 65 %. There are no regional wall motion abnormalities seen.   2. Mildly enlarged right ventricular cavity size and mildly reduced right ventricular systolic function.   3. Normal left and right atrial size.   4. Moderate pulmonic regurgitation.   5. Severely dilated main pulmonary artery.   6. Unspecified wire seen in right heart is visualized.   7. Structurally normal pulmonic valve with normal leaflet excursion.   8. Mild focal atheroma in the visualized portions of the descending aorta.    ________________________________________________________________________________________  FINDINGS:     Left Ventricle:  Left ventricular wall thickness is normal. The interventricular septum is flattened in diastole ('D' shaped left ventricle) consistent with right ventricular volume overload. Left ventricular systolic function is normal with an ejection fraction visually estimated at 60 to 65%. There are no regional wall motion abnormalities seen.     Right Ventricle:  The right ventricular cavity is mildly enlarged in size and right ventricular systolic function is mildly reduced. Tricuspid annular planesystolic excursion (TAPSE) is 1.7 cm (normal >=1.7 cm). Unspecified wire seen in right heart is visualized.     Right Atrium:  The right atrium is normal in size.     Interatrial Septum:  The interatrial septum appears intact.     Aortic Valve:  The aortic valve is tricuspid with normal leaflet excursion. There is no aortic valve stenosis. There is no evidence of aortic regurgitation.     Mitral Valve:  Structurally normal mitral valve with normal leaflet excursion. There is trace mitral regurgitation.     Tricuspid Valve:  Structurally normal tricuspid valve with normal leaflet excursion.     Pulmonic Valve:  Structurally normal pulmonic valve with normal leaflet excursion. There is moderate pulmonic regurgitation.     Pulmonary Artery:  Themain pulmonary artery is severely dilated.     Aorta:  The aortic root appears normal in size. There is mild focal atheroma in the visualized portions of the descending aorta.     Systemic Veins:    ____________________________________________________________________  QUANTITATIVE DATA:  Left Ventricle Measurements: (Indexed to BSA)     Visualized LV EF%: 60 to 65%    Pulmonary Artery:  +-------------+-------+  |MPA Diam (s):|4.60 cm|  +-------------+-------+  |RPA Diam:    |3.10 cm|  +-------------+-------+             Right Ventricle Measurements:     TAPSE: 1.7 cm       Tricuspid Valve Measurements:     RA Pressure: 3 mmHg       Pulmonic Valve Measurments:  PV Vmax:          1.9 m/s  PV Peak Gradient: 14.0 mmHg  PV Mean Gradient: 5.0 mmHg    < end of copied text >

## 2025-06-04 NOTE — PROGRESS NOTE ADULT - PROBLEM SELECTOR PLAN 1
Has a history of abscesses notably abdominal (grown MRSA and pseudomonas in the past). Current location LUE.   -s/p I&D, f/u culture - MRSA and ESBL Klebsiella growing.   >    > 6/1 : blood cx + GPC, MRSA pcr pos > s/p Ceftriaxone per ID recs. -repeat blood cx x2 from 6/2 - NGTD. -Check TTE pending. Check LUE CT with IV contrast -f/u results.   -Abx now dapto and ertapenem.   - on Dapto, s/p Azithro per ID  - F/U ID team for further recs  -MRSA bacteremia. F/u repeat blood cultures - 6/2 cultures NGTD. -s/p PPM removal 6/3 by EP. -MYRIAM intraop per EP.   -tele.

## 2025-06-04 NOTE — PHARMACOTHERAPY INTERVENTION NOTE - COMMENTS
ROXI MARIA, 44y Female with MRSA bacteremia, source likely secondary to LUE abscess s/p I&D on admission. Patient was started on daptomycin 8 mg/kg IV Q24H empirically (hx of vancomycin allergy, listed as rash). Then the LUE abscess culture grew MRSA and K. pneumoniae, so ceftriaxone 2 grams IV Q24H added by transplant ID pending susceptibility testing.    The K. pneumoniae came back as ESBL this morning 6/2.    Recommendation(s):  Discussed with transplant ID, would change ceftriaxone to ertapenem 1 gram IV Q24H, c/w daptomycin for the MRSA bacteremia.    CK was not added initially when daptomycin was started, I added it on to the patient's AM labs as STAT lab add-on as per policy for baseline CK.    With kind regards,  Jermaine Yan, PharmD, BCIDP  Infectious Diseases Clinical Pharmacist  Available on [employer] Teams  .
Collected medication history from patient and pharmacy. Home medication list updated in prescription writer/ outpatient medication review.    Home medications:  Atrovent 18 mcg/inh inhalation aerosol: 1 inhaled 4 times a day as needed for  shortness of breath and/or wheezing  Fasenra Prefilled Syringe 30 mg/mL subcutaneous solution: 30 milligram(s) subcutaneously every 4 weeks . Next dose planned for Friday 6/6/25  Flonase 50 mcg/inh nasal spray: 1 spray(s) in each nostril once a day  montelukast 10 mg oral tablet: 1 tab(s) orally once a day  Remodulin 5 mg/mL injectable solution: 1 application injectable once a day via her own SQ PUMP  ·	Patient's dose is 51 ng/kg/min. Confirmed with patient and outpatient pulmonologist Dr. Yoon  semaglutide 1 mg/0.5 mL (1 mg dose) subcutaneous solution: 1 milligram(s) subcutaneously once a week on Fridays    Patient has been continuing her home Remodulin subcutaneous pump while inpatient. S/w Dr. Braun, and order was placed. Patient will self-administer her own medication via her own pump.     Jens Merida, PharmD, BCPS  Clinical Pharmacy Specialist  Available on [employer]  Cell: [phone]

## 2025-06-04 NOTE — PROGRESS NOTE ADULT - ASSESSMENT
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 Admitted for recurrent abdominal wall abscess s/p I&D. Cultures grew MRSA and pseudomonas. Patient completed antibiotic therapy. She underwent additional work up for transplant including MYRIAM, cMRI, MRCP, and CT colonography, here now with LUE abscess.    #pre lung transplant evaluation  Possible barriers to transplant:  - weight loss with target BMI < 32. Ideally < 30 as this is a major barrier to transplant  - nutritional/metabolic issues  - active/recurrent infections  - have pt contact weight loss clinic for ozempic (last dose 5/30/25) coverage; weekly dose  - minimize calories for consumption for goal of weight loss    Pulm  -discuss with Pulm team if any alternative to therapy not via pump to avoid adhesive   -given skin infections/irritation pt received Allergy evaluation 6/3/25 for specific adhesive alternatives/ different options for surgical dressing for future  - pHTN therapy per pulm team      Plan for Ms Conway to follow up with our team on a monthly basis moving forward with the focus on optimizing the above concerns.

## 2025-06-04 NOTE — PROGRESS NOTE ADULT - SUBJECTIVE AND OBJECTIVE BOX
INFECTIOUS DISEASES FOLLOW UP-- Aleja Jauregui  [phone]    This is a follow up note for this  44yFemale with  Cutaneous abscess of left upper extremity        ROS:  CONSTITUTIONAL:  No fever, good appetite  CARDIOVASCULAR:  No chest pain or palpitations  RESPIRATORY:  No dyspnea  GASTROINTESTINAL:  No nausea, vomiting, diarrhea, or abdominal pain  GENITOURINARY:  No dysuria  NEUROLOGIC:  No headache,     Allergies    vancomycin (Rash)  adhesives (Rash)    Intolerances        ANTIBIOTICS/RELEVANT:  antimicrobials  DAPTOmycin IVPB 600 milliGRAM(s) IV Intermittent <User Schedule>  ertapenem  IVPB 1000 milliGRAM(s) IV Intermittent every 24 hours    immunologic:    OTHER:  acetaminophen     Tablet .. 650 milliGRAM(s) Oral every 6 hours PRN  cetirizine 5 milliGRAM(s) Oral daily  chlorhexidine 2% Cloths 1 Application(s) Topical daily  fluticasone propionate 50 MICROgram(s)/spray Nasal Spray 1 Spray(s) Both Nostrils two times a day  HYDROmorphone  Injectable 0.5 milliGRAM(s) IV Push every 6 hours PRN  mupirocin 2% Nasal 1 Application(s) Both Nostrils two times a day  oxyCODONE    IR 5 milliGRAM(s) Oral every 4 hours PRN  oxyCODONE    IR 5 milliGRAM(s) Oral once  pantoprazole    Tablet 40 milliGRAM(s) Oral before breakfast  potassium chloride    Tablet ER 20 milliEquivalent(s) Oral once  predniSONE   Tablet 5 milliGRAM(s) Oral daily  Treprostinil (Remodulin) subcutaneous continuous infusion 51 NANOgrams/kg/min 1 Pump(s) 1 Each SubCutaneous Continuous Pump  triamcinolone 0.1% Cream 1 Application(s) Topical two times a day      Objective:  Vital Signs Last 24 Hrs  T(C): 36.6 (04 Jun 2025 14:38), Max: 36.7 (04 Jun 2025 04:51)  T(F): 97.8 (04 Jun 2025 14:38), Max: 98 (04 Jun 2025 04:51)  HR: 105 (04 Jun 2025 14:38) (97 - 108)  BP: 93/69 (04 Jun 2025 14:38) (93/69 - 115/70)  BP(mean): --  RR: 18 (04 Jun 2025 14:38) (18 - 19)  SpO2: 98% (04 Jun 2025 14:38) (91% - 98%)    Parameters below as of 04 Jun 2025 14:38  Patient On (Oxygen Delivery Method): nasal cannula  O2 Flow (L/min): 2      PHYSICAL EXAM:  Constitutional:no acute distress  Eyes:DEMETRIUS, EOMI  Ear/Nose/Throat: no oral lesions, 	  Respiratory: clear BL  Cardiovascular: S1S2  Gastrointestinal:soft, (+) BS, no tenderness  Extremities:no e/e/c  No Lymphadenopathy  IV sites not inflammed.    LABS:                        11.6   9.84  )-----------( 344      ( 03 Jun 2025 04:37 )             36.7     06-04    139  |  104  |  7   ----------------------------<  97  4.4   |  23  |  0.77    Ca    8.5      04 Jun 2025 06:07      PT/INR - ( 03 Jun 2025 04:37 )   PT: 12.0 sec;   INR: 1.05 ratio           Urinalysis Basic - ( 04 Jun 2025 06:07 )    Color: x / Appearance: x / SG: x / pH: x  Gluc: 97 mg/dL / Ketone: x  / Bili: x / Urobili: x   Blood: x / Protein: x / Nitrite: x   Leuk Esterase: x / RBC: x / WBC x   Sq Epi: x / Non Sq Epi: x / Bacteria: x        MICROBIOLOGY:            RECENT CULTURES:  06-02 @ 07:16  Blood Blood-Peripheral  --  --  --    No growth at 48 Hours  --  05-31 @ 00:10  Blood Blood-Peripheral  Blood Culture PCR  Methicillin resistant Staphylococcus aureus  Blood Culture PCR  PCR    Growth in anaerobic bottle: Methicillin Resistant Staphylococcus aureus  Direct identification is available within approximately 3-5  hours either by Blood Panel Multiplexed PCR or Direct  MALDI-TOF. Details: [email or web address]  --  05-30 @ 23:49  Incision Incision  Methicillin resistant Staphylococcus aureus  Klebsiella pneumoniae ESBL  Methicillin resistant Staphylococcus aureus  BRIAN    Numerous Methicillin Resistant Staphylococcus aureus  Numerous Klebsiella pneumoniae ESBL  --      RADIOLOGY & ADDITIONAL STUDIES:    < from: CT Upper Extremity w/ IV Cont, Left (06.03.25 @ 20:21) >  IMPRESSION:    CT of the left upper extremity demonstrates scattered areas of edema and   nodular foci within example at the subcutaneous tissues at the level of   the midhumerus along the skin surface posteriorly measuring up to 1.4 cm.   Finding is nonspecific and may be inflammatory/infectious in etiology.   Recommend correlation with physical exam and consideration of focused   ultrasound.    Subcutaneous stranding and foci of gas along the partial included   anterior chest wall, finding may be iatrogenic from recent vascular   access, however, recommend correlation with patient history and physical   exam.    Negative for fracture or dislocation.    --- End of Report ---    < end of copied text >   INFECTIOUS DISEASES FOLLOW UP-- Aleja Jauregui  [phone]    This is a follow up note for this  44yFemale with  Cutaneous abscess of left upper extremity  left arm ace bandage      ROS:  CONSTITUTIONAL:  No fever, good appetite  CARDIOVASCULAR:  No chest pain or palpitations  RESPIRATORY:  No dyspnea  GASTROINTESTINAL:  No nausea, vomiting, diarrhea, or abdominal pain  GENITOURINARY:  No dysuria  NEUROLOGIC:  No headache,     Allergies    vancomycin (Rash)  adhesives (Rash)    Intolerances        ANTIBIOTICS/RELEVANT:  antimicrobials  DAPTOmycin IVPB 600 milliGRAM(s) IV Intermittent <User Schedule>  ertapenem  IVPB 1000 milliGRAM(s) IV Intermittent every 24 hours    immunologic:    OTHER:  acetaminophen     Tablet .. 650 milliGRAM(s) Oral every 6 hours PRN  cetirizine 5 milliGRAM(s) Oral daily  chlorhexidine 2% Cloths 1 Application(s) Topical daily  fluticasone propionate 50 MICROgram(s)/spray Nasal Spray 1 Spray(s) Both Nostrils two times a day  HYDROmorphone  Injectable 0.5 milliGRAM(s) IV Push every 6 hours PRN  mupirocin 2% Nasal 1 Application(s) Both Nostrils two times a day  oxyCODONE    IR 5 milliGRAM(s) Oral every 4 hours PRN  oxyCODONE    IR 5 milliGRAM(s) Oral once  pantoprazole    Tablet 40 milliGRAM(s) Oral before breakfast  potassium chloride    Tablet ER 20 milliEquivalent(s) Oral once  predniSONE   Tablet 5 milliGRAM(s) Oral daily  Treprostinil (Remodulin) subcutaneous continuous infusion 51 NANOgrams/kg/min 1 Pump(s) 1 Each SubCutaneous Continuous Pump  triamcinolone 0.1% Cream 1 Application(s) Topical two times a day      Objective:  Vital Signs Last 24 Hrs  T(C): 36.6 (04 Jun 2025 14:38), Max: 36.7 (04 Jun 2025 04:51)  T(F): 97.8 (04 Jun 2025 14:38), Max: 98 (04 Jun 2025 04:51)  HR: 105 (04 Jun 2025 14:38) (97 - 108)  BP: 93/69 (04 Jun 2025 14:38) (93/69 - 115/70)  BP(mean): --  RR: 18 (04 Jun 2025 14:38) (18 - 19)  SpO2: 98% (04 Jun 2025 14:38) (91% - 98%)    Parameters below as of 04 Jun 2025 14:38  Patient On (Oxygen Delivery Method): nasal cannula  O2 Flow (L/min): 2      PHYSICAL EXAM:  Constitutional:no acute distress  Eyes:DEMETRIUS, EOMI  Ear/Nose/Throat: no oral lesions, 	  Respiratory: clear BL  Cardiovascular: S1S2  Gastrointestinal:soft, (+) BS, no tenderness  Extremities:no e/e/c  LUE bandage in place  No Lymphadenopathy  IV sites not inflammed.    LABS:                        11.6   9.84  )-----------( 344      ( 03 Jun 2025 04:37 )             36.7     06-04    139  |  104  |  7   ----------------------------<  97  4.4   |  23  |  0.77    Ca    8.5      04 Jun 2025 06:07      PT/INR - ( 03 Jun 2025 04:37 )   PT: 12.0 sec;   INR: 1.05 ratio           Urinalysis Basic - ( 04 Jun 2025 06:07 )    Color: x / Appearance: x / SG: x / pH: x  Gluc: 97 mg/dL / Ketone: x  / Bili: x / Urobili: x   Blood: x / Protein: x / Nitrite: x   Leuk Esterase: x / RBC: x / WBC x   Sq Epi: x / Non Sq Epi: x / Bacteria: x        MICROBIOLOGY:            RECENT CULTURES:  06-02 @ 07:16  Blood Blood-Peripheral  --  --  --    No growth at 48 Hours  --  05-31 @ 00:10  Blood Blood-Peripheral  Blood Culture PCR  Methicillin resistant Staphylococcus aureus  Blood Culture PCR  PCR    Growth in anaerobic bottle: Methicillin Resistant Staphylococcus aureus  Direct identification is available within approximately 3-5  hours either by Blood Panel Multiplexed PCR or Direct  MALDI-TOF. Details: [email or web address]  --  05-30 @ 23:49  Incision Incision  Methicillin resistant Staphylococcus aureus  Klebsiella pneumoniae ESBL  Methicillin resistant Staphylococcus aureus  BRIAN    Numerous Methicillin Resistant Staphylococcus aureus  Numerous Klebsiella pneumoniae ESBL  --      RADIOLOGY & ADDITIONAL STUDIES:    < from: CT Upper Extremity w/ IV Cont, Left (06.03.25 @ 20:21) >  IMPRESSION:    CT of the left upper extremity demonstrates scattered areas of edema and   nodular foci within example at the subcutaneous tissues at the level of   the midhumerus along the skin surface posteriorly measuring up to 1.4 cm.   Finding is nonspecific and may be inflammatory/infectious in etiology.   Recommend correlation with physical exam and consideration of focused   ultrasound.    Subcutaneous stranding and foci of gas along the partial included   anterior chest wall, finding may be iatrogenic from recent vascular   access, however, recommend correlation with patient history and physical   exam.    Negative for fracture or dislocation.    --- End of Report ---    < end of copied text >

## 2025-06-04 NOTE — PROGRESS NOTE ADULT - ASSESSMENT
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 In the ED afebrile and hemodynamically stable. Labs notable for wbc 11.4 (84% neutrophils), BMP wnl. s/p I&D in the ER and started on abx. Wound cx and blood cx (4/4 bottles) + for MRSA and ESBL klebisella. Tx ID following, TTE 04/25 with normal LVEF, reduced RV fx, mod AL, severe mPA dilation, mild pHTN. EP consulted for extraction.     1. MRSA bacteremia    - POD #1 s/p [employer] dual chamber PPM (2018) extraction  - Post extraction teaching enforced with patient   - No Heparin or Lovenox post procedure  - Patient to follow up in EP clinic for wound check as scheduled on 6/16/25 at 11:40 AM  - Antibiotics per ID  - EP to sign off, reconsult as needed

## 2025-06-04 NOTE — PROGRESS NOTE ADULT - ASSESSMENT
44 year old female, PMH pHTN (on treprostinil SQ pump) currently on lung transplant list, HF (s/p PPM dual chamber 2016), chronic PE (dx 2017) no longer on DOAC, SVT (s/p ablation 05/2020), asthma, with recurrent abscesses (abdominal wall, UE, most recently MRSA) p/w upper extremity abscess.    #MRSA bacteremia  --Continue daptomycin 600 mg IV every 24 hours  --please obtain TTE to evaluate valves    #Skin Abscess, Positive culture finding  --Continue daptomycin as above    --6/1/25 creatine kinase =40  --Follow up on wound culture of forearm abscess shows ESBL klebsiella and abx changed to ertapenem    #Human metapneumovirus infection  --Supportive care  --Contact isolation per infection control policy    #Pre-Lung Transplant Evaluation  COVID19 Nucleocapsid Antibody Negative  COVID19 Rajinder Antibody Positive  HAV IgG Negative  HBVs Ab Negative  HBVsAg Negative  HBVc Ab Negative  HCV Ab Negative  HSV 1 IgG Positive  HSV 2 IgG Negative  EBV IgG Positive  CMV IgG Positive  VZV IgG Positive  Measles IgG Positive  Mumps IgG Positive  Rubella IgG Positive  Quantiferon Gold Negative  HIV Ag/Ab by CMIA Negative  Syphilis Screen Negative  Toxoplasma IgG Negative  Strongyloides Ab Negative  Trypanosoma cruzi Ab Negative  --ID clearance for lung transplant pending resolution of LUE arm abscess and treatment of MRSA bacteremia    #Encounter to Vaccinate Patient  COVID19: Would benefit from COVID19 [id number] Vaccine Dose  Influenza: needs this next season ,   RSV: Arexvy 3/6/25  Pneumococcal: States she had pneumococcal vaccination ~2022  HAV: Would benefit from Havrix  HBV: Heplisav Dose 1 3/6/25. Dose 2 of Heplisav ordered  MMR: Rubella IgG pending. Mumps and Measles immune  Varicella: Immune will not require further vaccination  Shingles: Will recommend Shingrix  Tdap: Will recommend Tdap    Interval events, labs, documents, radiology, microbiology and infection control needs reviewed    Sreekanth Jauregui MD  Can be called via Teams  After 5pm/weekends [phone]   44 year old female, PMH pHTN (on treprostinil SQ pump) currently on lung transplant list, HF (s/p PPM dual chamber 2016), chronic PE (dx 2017) no longer on DOAC, SVT (s/p ablation 05/2020), asthma, with recurrent abscesses (abdominal wall, UE, most recently MRSA) p/w upper extremity abscess.    #MRSA bacteremia  --Continue daptomycin 600 mg IV every 24 hours  --please obtain TTE to evaluate valves    #Skin Abscess, Positive culture finding  --Continue daptomycin as above    --6/1/25 creatine kinase =40  --Follow up on wound culture of forearm abscess shows ESBL klebsiella and abx changed to ertapenem    #Human metapneumovirus infection  --Supportive care  --Contact isolation per infection control policy    #Pre-Lung Transplant Evaluation  COVID19 Nucleocapsid Antibody Negative  COVID19 Rajinder Antibody Positive  HAV IgG Negative  HBVs Ab Negative  HBVsAg Negative  HBVc Ab Negative  HCV Ab Negative  HSV 1 IgG Positive  HSV 2 IgG Negative  EBV IgG Positive  CMV IgG Positive  VZV IgG Positive  Measles IgG Positive  Mumps IgG Positive  Rubella IgG Positive  Quantiferon Gold Negative  HIV Ag/Ab by CMIA Negative  Syphilis Screen Negative  Toxoplasma IgG Negative  Strongyloides Ab Negative  Trypanosoma cruzi Ab Negative  --ID clearance for lung transplant pending resolution of LUE arm abscess and treatment of MRSA bacteremia    #Encounter to Vaccinate Patient  COVID19: Would benefit from COVID19 [id number] Vaccine Dose  Influenza: needs this next season ,   RSV: Arexvy 3/6/25  Pneumococcal: States she had pneumococcal vaccination ~2022  HAV: Would benefit from Havrix  HBV: Heplisav Dose 1 3/6/25. Dose 2 of Heplisav ordered  MMR: Rubella IgG pending. Mumps and Measles immune  Varicella: Immune will not require further vaccination  Shingles: Will recommend Shingrix  Tdap: Will recommend Tdap    Once she completes therapy will try to decolonize with mupiricin and chlorhexidine    Interval events, labs, documents, radiology, microbiology and infection control needs reviewed    Sreekanth Jauregui MD  Can be called via Teams  After 5pm/weekends [phone]

## 2025-06-04 NOTE — PROGRESS NOTE ADULT - SUBJECTIVE AND OBJECTIVE BOX
Patient is a 44y old  Female who presents with a chief complaint of Abscess (04 Jun 2025 18:44)        SUBJECTIVE / OVERNIGHT EVENTS: reports pain at chest wall ppm site where removed.       MEDICATIONS  (STANDING):  cetirizine 5 milliGRAM(s) Oral daily  chlorhexidine 2% Cloths 1 Application(s) Topical daily  DAPTOmycin IVPB 600 milliGRAM(s) IV Intermittent <User Schedule>  ertapenem  IVPB 1000 milliGRAM(s) IV Intermittent every 24 hours  fluticasone propionate 50 MICROgram(s)/spray Nasal Spray 1 Spray(s) Both Nostrils two times a day  mupirocin 2% Nasal 1 Application(s) Both Nostrils two times a day  oxyCODONE    IR 5 milliGRAM(s) Oral once  pantoprazole    Tablet 40 milliGRAM(s) Oral before breakfast  potassium chloride    Tablet ER 20 milliEquivalent(s) Oral once  predniSONE   Tablet 5 milliGRAM(s) Oral daily  Treprostinil (Remodulin) subcutaneous continuous infusion 51 NANOgrams/kg/min 1 Pump(s) 1 Each SubCutaneous Continuous Pump  triamcinolone 0.1% Cream 1 Application(s) Topical two times a day    MEDICATIONS  (PRN):  acetaminophen     Tablet .. 650 milliGRAM(s) Oral every 6 hours PRN Mild Pain (1 - 3), Moderate Pain (4 - 6)  HYDROmorphone  Injectable 0.5 milliGRAM(s) IV Push every 6 hours PRN Severe Pain (7 - 10)  oxyCODONE    IR 5 milliGRAM(s) Oral every 4 hours PRN Moderate Pain (4 - 6)      Vital Signs Last 24 Hrs  T(C): 36.7 (04 Jun 2025 20:45), Max: 36.7 (04 Jun 2025 04:51)  T(F): 98.1 (04 Jun 2025 20:45), Max: 98.1 (04 Jun 2025 20:45)  HR: 105 (04 Jun 2025 20:45) (97 - 108)  BP: 112/69 (04 Jun 2025 20:45) (93/69 - 112/69)  BP(mean): --  RR: 19 (04 Jun 2025 20:45) (18 - 19)  SpO2: 97% (04 Jun 2025 20:45) (91% - 98%)    Parameters below as of 04 Jun 2025 20:45  Patient On (Oxygen Delivery Method): nasal cannula  O2 Flow (L/min): 2    CAPILLARY BLOOD GLUCOSE        I&O's Summary    03 Jun 2025 07:01  -  04 Jun 2025 07:00  --------------------------------------------------------  IN: 0 mL / OUT: 300 mL / NET: -300 mL    04 Jun 2025 07:01  -  04 Jun 2025 20:59  --------------------------------------------------------  IN: 1010 mL / OUT: 700 mL / NET: 310 mL          PHYSICAL EXAM:   GENERAL: NAD,    HEAD:  Atraumatic, Normocephalic  EYES: conjunctiva and sclera clear  NECK: Supple,   CHEST/LUNG: Clear to auscultation bilaterally; No wheeze  HEART: S1S2 normal. Regular rate and rhythm;   ABDOMEN: Soft, Nontender, Nondistended; Bowel sounds present  EXTREMITIES:   No clubbing, cyanosis, or edema  PSYCH/Neuro: AAOx3. Non-focal.   SKIN: left chest wall ppm site intact.      LABS:                        11.6   9.84  )-----------( 344      ( 03 Jun 2025 04:37 )             36.7     06-04    139  |  104  |  7   ----------------------------<  97  4.4   |  23  |  0.77    Ca    8.5      04 Jun 2025 06:07      PT/INR - ( 03 Jun 2025 04:37 )   PT: 12.0 sec;   INR: 1.05 ratio               Urinalysis Basic - ( 04 Jun 2025 06:07 )    Color: x / Appearance: x / SG: x / pH: x  Gluc: 97 mg/dL / Ketone: x  / Bili: x / Urobili: x   Blood: x / Protein: x / Nitrite: x   Leuk Esterase: x / RBC: x / WBC x   Sq Epi: x / Non Sq Epi: x / Bacteria: x          RADIOLOGY & ADDITIONAL TESTS:    Imaging Personally Reviewed:  Consultant(s) Notes Reviewed:  pulm, EP, allergy  Care Discussed with Consultants/Other Providers: angelica pulm

## 2025-06-04 NOTE — PROGRESS NOTE ADULT - SUBJECTIVE AND OBJECTIVE BOX
Interval Events: s/p PPM lead extraction. Bcx clear from 6/2.    PHYSICAL EXAM:  General: nad  HEENT: MMM, PERRLA  Respiratory: ctab  Cardiovascular: 3/6 holosystolic murmur , RRR  Abdomen: NTND  Extremities: warm, abscess site dressed and clean  Neurological: AOx3, no gross deficits    REVIEW OF SYSTEMS:  All systems negative unless described below:    OBJECTIVE:  ICU Vital Signs Last 24 Hrs  T(C): 36.7 (04 Jun 2025 04:51), Max: 36.7 (04 Jun 2025 04:51)  T(F): 98 (04 Jun 2025 04:51), Max: 98 (04 Jun 2025 04:51)  HR: 108 (04 Jun 2025 04:51) (90 - 108)  BP: 105/67 (04 Jun 2025 04:51) (104/74 - 115/70)  BP(mean): 86 (03 Jun 2025 10:25) (86 - 86)  ABP: 118/63 (03 Jun 2025 15:15) (114/56 - 121/59)  ABP(mean): 79 (03 Jun 2025 15:15) (71 - 79)  RR: 19 (04 Jun 2025 04:51) (15 - 19)  SpO2: 91% (04 Jun 2025 04:51) (91% - 100%)    O2 Parameters below as of 04 Jun 2025 04:51  Patient On (Oxygen Delivery Method): room air              06-03 @ 07:01  -  06-04 @ 07:00  --------------------------------------------------------  IN: 0 mL / OUT: 300 mL / NET: -300 mL      CAPILLARY BLOOD GLUCOSE              HOSPITAL MEDICATIONS:    DAPTOmycin IVPB 600 milliGRAM(s) IV Intermittent <User Schedule>  ertapenem  IVPB 1000 milliGRAM(s) IV Intermittent every 24 hours      predniSONE   Tablet 5 milliGRAM(s) Oral daily      acetaminophen     Tablet .. 650 milliGRAM(s) Oral every 6 hours PRN  oxyCODONE    IR 10 milliGRAM(s) Oral every 6 hours PRN  oxyCODONE    IR 5 milliGRAM(s) Oral every 4 hours PRN  oxyCODONE    IR 5 milliGRAM(s) Oral once    pantoprazole    Tablet 40 milliGRAM(s) Oral before breakfast        potassium chloride    Tablet ER 20 milliEquivalent(s) Oral once      chlorhexidine 2% Cloths 1 Application(s) Topical daily  fluticasone propionate 50 MICROgram(s)/spray Nasal Spray 1 Spray(s) Both Nostrils two times a day  mupirocin 2% Nasal 1 Application(s) Both Nostrils two times a day        LABS:                        11.6   9.84  )-----------( 344      ( 03 Jun 2025 04:37 )             36.7     Hgb Trend: 11.6<--, 11.8<--, 13.0<--  06-04    139  |  104  |  7   ----------------------------<  97  4.4   |  23  |  0.77    Ca    8.5      04 Jun 2025 06:07      Creatinine Trend: 0.77<--, 0.72<--, 0.74<--, 0.97<--  PT/INR - ( 03 Jun 2025 04:37 )   PT: 12.0 sec;   INR: 1.05 ratio           Urinalysis Basic - ( 04 Jun 2025 06:07 )    Color: x / Appearance: x / SG: x / pH: x  Gluc: 97 mg/dL / Ketone: x  / Bili: x / Urobili: x   Blood: x / Protein: x / Nitrite: x   Leuk Esterase: x / RBC: x / WBC x   Sq Epi: x / Non Sq Epi: x / Bacteria: x            MICROBIOLOGY:     RADIOLOGY:  [x] Reviewed and interpreted by me

## 2025-06-04 NOTE — PROGRESS NOTE ADULT - SUBJECTIVE AND OBJECTIVE BOX
Lung Transplant Progress Note  =====================================================  24 hour events: Pt seen and examined bedside, breathing comfortably on 3L NC    T(C): 36.7 (06-04-25 @ 04:51), Max: 36.7 (06-04-25 @ 04:51)  HR: 97 (06-04-25 @ 08:00) (90 - 108)  BP: 105/67 (06-04-25 @ 04:51) (105/67 - 115/70)  RR: 18 (06-04-25 @ 08:00) (15 - 19)  SpO2: 94% (06-04-25 @ 08:00) (91% - 96%)    PAST MEDICAL & SURGICAL HISTORY:  Anemia      Pulmonary hypertension      Asthma  On home O2 nightly at 2L via NC      PE (pulmonary thromboembolism)  on Xarelto 10 mg daily      Sinusitis      Right heart failure      H/O pulmonary hypertension      Pulmonary embolism      History of tachycardia      On supplemental oxygen by nasal cannula  O2 @ 2L      Pacemaker      Right atrial thrombus      Hypotension      Pacemaker      History of pacemaker  12/19 - [employer] model Ingevity 4469        Home Meds: Home Medications:  acetaminophen 500 mg oral tablet: 2 tab(s) orally every 6 hours As needed Mild Pain (1 - 3) (24 Apr 2025 11:22)  Atrovent 18 mcg/inh inhalation aerosol: 1 inhaled 4 times a day as needed for  shortness of breath and/or wheezing (02 Apr 2025 16:24)  omeprazole 40 mg oral delayed release capsule: 1 cap(s) orally once a day (02 Apr 2025 16:24)  Remodulin 5 mg/mL injectable solution: 1 application injectable once a day via her own SQ PUMP (02 Apr 2025 16:24)    Allergies: Allergies    vancomycin (Rash)  adhesives (Rash)    Intolerances        REVIEW OF SYSTEMS    [x] A ten-point review of systems was otherwise negative except as noted.  [ ] Due to altered mental status/intubation, subjective information were not able to be obtained from the patient. History was obtained, to the extent possible, from review of the chart and collateral sources of information.      CURRENT MEDICATIONS:   Neurologic Medications  acetaminophen     Tablet .. 650 milliGRAM(s) Oral every 6 hours PRN Mild Pain (1 - 3), Moderate Pain (4 - 6)  oxyCODONE    IR 10 milliGRAM(s) Oral every 6 hours PRN Severe Pain (7 - 10)  oxyCODONE    IR 5 milliGRAM(s) Oral every 4 hours PRN Moderate Pain (4 - 6)  oxyCODONE    IR 5 milliGRAM(s) Oral once    Respiratory Medications    Cardiovascular Medications    Gastrointestinal Medications  pantoprazole    Tablet 40 milliGRAM(s) Oral before breakfast  potassium chloride    Tablet ER 20 milliEquivalent(s) Oral once    Genitourinary Medications    Hematologic/Oncologic Medications    Antimicrobial/Immunologic Medications  DAPTOmycin IVPB 600 milliGRAM(s) IV Intermittent <User Schedule>  ertapenem  IVPB 1000 milliGRAM(s) IV Intermittent every 24 hours    Endocrine/Metabolic Medications  predniSONE   Tablet 5 milliGRAM(s) Oral daily    Topical/Other Medications  chlorhexidine 2% Cloths 1 Application(s) Topical daily  fluticasone propionate 50 MICROgram(s)/spray Nasal Spray 1 Spray(s) Both Nostrils two times a day  mupirocin 2% Nasal 1 Application(s) Both Nostrils two times a day      INS/OUTS (last 24 hours):  I&O's Summary    03 Jun 2025 07:01  -  04 Jun 2025 07:00  --------------------------------------------------------  IN: 0 mL / OUT: 300 mL / NET: -300 mL      --------------------------------------------------------------------------------------    PHYSICAL EXAM:  GENERAL: NAD, resting comfortably in bed  HEAD:  Atraumatic, normocephalic  EYES: EOMI, conjunctiva and sclera clear  NECK: Supple  CHEST/LUNG: CTA B/L, good inspiratory effort  HEART: RRR. +S1/S2  ABDOMEN: Soft, nondistended, nontender to palpation  EXTREMITIES: No clubbing, cyanosis, or edema  PSYCH: A&O x3  NEUROLOGY: Non-focal, motor & sensory grossly intact  SKIN: Warm & dry, no rashes or lesions    LABS:                        11.6   9.84  )-----------( 344      ( 03 Jun 2025 04:37 )             36.7     06-04    139  |  104  |  7   ----------------------------<  97  4.4   |  23  |  0.77    Ca    8.5      04 Jun 2025 06:07      PT/INR - ( 03 Jun 2025 04:37 )   PT: 12.0 sec;   INR: 1.05 ratio           Urinalysis Basic - ( 04 Jun 2025 06:07 )    Color: x / Appearance: x / SG: x / pH: x  Gluc: 97 mg/dL / Ketone: x  / Bili: x / Urobili: x   Blood: x / Protein: x / Nitrite: x   Leuk Esterase: x / RBC: x / WBC x   Sq Epi: x / Non Sq Epi: x / Bacteria: x        Culture - Blood (collected 02 Jun 2025 07:16)  Source: Blood Blood-Peripheral  Preliminary Report (03 Jun 2025 12:01):    No growth at 24 hours    Culture - Blood (collected 02 Jun 2025 07:16)  Source: Blood Blood-Peripheral  Preliminary Report (03 Jun 2025 12:01):    No growth at 24 hours      CAPILLARY BLOOD GLUCOSE          RADIOLOGY:

## 2025-06-04 NOTE — PROGRESS NOTE ADULT - PROBLEM SELECTOR PLAN 2
-off of xarelto, nifedipine, diuretics  -continue remodulin - f/u with pulm/ID if pump needs to be adjusted with recurrent skin infections. -They are looking into this for outpatient.   -Allergy appreciated; Zyrtec daily added. Can try different adhesives.    -continue atrovent  -continue NC  -Transplant candidacy reviewed, currently evaluation closed until BMI closer to target in addition to recurrent infections more under control. -Resume Ozempic once able. -Outpatient weight loss clinic f/u.   - F/U transplant pulm team for further recs

## 2025-06-04 NOTE — PROGRESS NOTE ADULT - ASSESSMENT
44F w/ PMH of pHTN on Remodulin sq, UE DVT on Xarelto, Asthma, CHF  She is also following with transplant pulmonary though currently not a candidate.  Comes to the ED with concern with concern for UE abscess, S/p I&D    # pHTN  Patient has a known history of PAH on remodulin. Also one O2 at home, 2-3lpm. Tentative plan to transition from remodulin to winrevair or add it as adjunct soon per Dr Yoon, but not during current state of infection. Appears stable from baseline. Previously also on Xarelto, Bumex, Aldactone  - continue home Remodulin 51ng/kg/min on home pump  - monitor volume status, diurese as needed to keep net even  - monitor K, Na, Mg  - appreciate allergy recs    - 3/6 holosystolic murmur present, ISO MRSA bacteremia would strongly recd obtaining repeat TTE    # asthma  Patient with known asthma, On Fasenra. PFTs from 1/2025 w/ severe obstruction, low DLCO.  - continue home equivalent ipratropium, levalbuterol (off budesonide due to facial rash)  - continue flonase  - PRN levalbuterol for wheezing, q6h   44F w/ PMH of pHTN on Remodulin sq, UE DVT on Xarelto, Asthma, CHF  She is also following with transplant pulmonary though currently not a candidate.  Comes to the ED with concern with concern for UE abscess, S/p I&D    # pHTN  Patient has a known history of PAH on remodulin. Also one O2 at home, 2-3lpm. Tentative plan to transition from remodulin to winrevair or add it as adjunct soon per Dr Yoon, but not during current state of infection. Appears stable from baseline. Previously also on Xarelto, Bumex, Aldactone  - continue home Remodulin 51ng/kg/min on home pump  - monitor volume status, diurese as needed to keep net even  - monitor K, Na, Mg  - appreciate allergy recs    - 3/6 holosystolic murmur present, ISO MRSA bacteremia would strongly recd obtaining repeat TTE  - cont w/ pain control, please add lidocaine patch on anterior chest to permit proper inspiration to prevent atelectasis  - please order one dose of IV Lasix with daily reassessments as she is slightly volume overloaded today  - incentive spirometer    # asthma  Patient with known asthma, On Fasenra. PFTs from 1/2025 w/ severe obstruction, low DLCO.  - continue home equivalent ipratropium, levalbuterol (off budesonide due to facial rash)  - continue flonase  - PRN levalbuterol for wheezing, q6h

## 2025-06-04 NOTE — PROGRESS NOTE ADULT - ASSESSMENT
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 Admitted for recurrent abdominal wall aabscess s/p I&D. Cultures grew MRSA and pseudomonas. Patient completed antibiotic therapy. She underwent additional work up for transplant including MYRIAM, cMRI, MRCP, and CT colonography, here now with LUE abscess.    #pre lung transplant evaluation  Possible barriers to transplant:  - weight loss with target BMI < 32. Ideally < 30 as this is a major barrier to transplant  - nutritional/metabolic issues  - active/recurrent infections  - please have weight loss clinic reorder Ozempic coverage as outpatient (last dose 5/30), weekly dosing    Pulm  -will discuss with Pulm team if any alternative to therapy not via pump  -given skin infections/irritation Allergy team saw pt to recommend specific adhesives and different options for surgical dressing for future (seen 6/3/25)      Plan for Ms Conway to follow up with our team on a monthly basis moving forward with the focus on optimizing the above concerns.

## 2025-06-05 ENCOUNTER — TRANSCRIPTION ENCOUNTER (OUTPATIENT)
Age: 45
End: 2025-06-05

## 2025-06-05 PROCEDURE — 99233 SBSQ HOSP IP/OBS HIGH 50: CPT

## 2025-06-05 PROCEDURE — 99232 SBSQ HOSP IP/OBS MODERATE 35: CPT

## 2025-06-05 RX ORDER — HYDROMORPHONE/SOD CHLOR,ISO/PF 2 MG/10 ML
0.5 SYRINGE (ML) INJECTION EVERY 4 HOURS
Refills: 0 | Status: DISCONTINUED | OUTPATIENT
Start: 2025-06-05 | End: 2025-06-10

## 2025-06-05 RX ORDER — DIPHENHYDRAMINE HCL 12.5MG/5ML
50 ELIXIR ORAL EVERY 4 HOURS
Refills: 0 | Status: DISCONTINUED | OUTPATIENT
Start: 2025-06-05 | End: 2025-06-11

## 2025-06-05 RX ORDER — LACTOBACILLUS ACIDOPHILUS/PECT 75 MM-100
1 CAPSULE ORAL
Refills: 0 | Status: DISCONTINUED | OUTPATIENT
Start: 2025-06-05 | End: 2025-06-11

## 2025-06-05 RX ORDER — FLUCONAZOLE 150 MG
150 TABLET ORAL ONCE
Refills: 0 | Status: COMPLETED | OUTPATIENT
Start: 2025-06-05 | End: 2025-06-05

## 2025-06-05 RX ORDER — DIPHENHYDRAMINE HCL 12.5MG/5ML
25 ELIXIR ORAL EVERY 6 HOURS
Refills: 0 | Status: DISCONTINUED | OUTPATIENT
Start: 2025-06-05 | End: 2025-06-05

## 2025-06-05 RX ORDER — FUROSEMIDE 10 MG/ML
20 INJECTION INTRAMUSCULAR; INTRAVENOUS ONCE
Refills: 0 | Status: COMPLETED | OUTPATIENT
Start: 2025-06-05 | End: 2025-06-05

## 2025-06-05 RX ADMIN — DAPTOMYCIN 124 MILLIGRAM(S): 500 INJECTION, POWDER, LYOPHILIZED, FOR SOLUTION INTRAVENOUS at 01:16

## 2025-06-05 RX ADMIN — Medication 0.5 MILLIGRAM(S): at 18:10

## 2025-06-05 RX ADMIN — Medication 0.5 MILLIGRAM(S): at 13:10

## 2025-06-05 RX ADMIN — Medication 500 MICROGRAM(S): at 01:38

## 2025-06-05 RX ADMIN — Medication 0.5 MILLIGRAM(S): at 01:18

## 2025-06-05 RX ADMIN — ERTAPENEM SODIUM 100 MILLIGRAM(S): 1 INJECTION, POWDER, LYOPHILIZED, FOR SOLUTION INTRAMUSCULAR; INTRAVENOUS at 18:00

## 2025-06-05 RX ADMIN — Medication 50 MILLIGRAM(S): at 14:41

## 2025-06-05 RX ADMIN — Medication 0.5 MILLIGRAM(S): at 05:35

## 2025-06-05 RX ADMIN — Medication 650 MILLIGRAM(S): at 12:51

## 2025-06-05 RX ADMIN — Medication 0.5 MILLIGRAM(S): at 12:56

## 2025-06-05 RX ADMIN — Medication 150 MILLIGRAM(S): at 14:42

## 2025-06-05 RX ADMIN — FLUTICASONE PROPIONATE 1 SPRAY(S): 50 SPRAY, METERED NASAL at 05:35

## 2025-06-05 RX ADMIN — DAPTOMYCIN 124 MILLIGRAM(S): 500 INJECTION, POWDER, LYOPHILIZED, FOR SOLUTION INTRAVENOUS at 22:48

## 2025-06-05 RX ADMIN — Medication 0.5 MILLIGRAM(S): at 06:23

## 2025-06-05 RX ADMIN — Medication 0.5 MILLIGRAM(S): at 23:29

## 2025-06-05 RX ADMIN — Medication 500 MICROGRAM(S): at 22:46

## 2025-06-05 RX ADMIN — Medication 5 MILLIGRAM(S): at 12:51

## 2025-06-05 RX ADMIN — Medication 1 APPLICATION(S): at 12:49

## 2025-06-05 RX ADMIN — Medication 500 MICROGRAM(S): at 10:00

## 2025-06-05 RX ADMIN — FLUTICASONE PROPIONATE 1 SPRAY(S): 50 SPRAY, METERED NASAL at 18:07

## 2025-06-05 RX ADMIN — FUROSEMIDE 20 MILLIGRAM(S): 10 INJECTION INTRAMUSCULAR; INTRAVENOUS at 14:30

## 2025-06-05 RX ADMIN — PREDNISONE 5 MILLIGRAM(S): 20 TABLET ORAL at 05:39

## 2025-06-05 RX ADMIN — MUPIROCIN CALCIUM 1 APPLICATION(S): 20 CREAM TOPICAL at 05:36

## 2025-06-05 RX ADMIN — Medication 650 MILLIGRAM(S): at 13:39

## 2025-06-05 NOTE — PROGRESS NOTE ADULT - SUBJECTIVE AND OBJECTIVE BOX
Lung Transplant Progress Note  =====================================================  24 hour events: Pt seen and examined bedside, breathing comfortably on NC    T(C): 36.6 (06-05-25 @ 08:25), Max: 36.7 (06-04-25 @ 20:45)  HR: 114 (06-05-25 @ 08:25) (90 - 114)  BP: 118/76 (06-05-25 @ 08:25) (93/69 - 118/76)  RR: 19 (06-05-25 @ 08:25) (18 - 19)  SpO2: 93% (06-05-25 @ 08:25) (93% - 98%)    PAST MEDICAL & SURGICAL HISTORY:  Anemia      Pulmonary hypertension      Asthma  On home O2 nightly at 2L via NC      PE (pulmonary thromboembolism)  on Xarelto 10 mg daily      Sinusitis      Right heart failure      H/O pulmonary hypertension      Pulmonary embolism      History of tachycardia      On supplemental oxygen by nasal cannula  O2 @ 2L      Pacemaker      Right atrial thrombus      Hypotension      Pacemaker      History of pacemaker  12/19 - [employer] model Ingevity 4469        Home Meds: Home Medications:  Atrovent 18 mcg/inh inhalation aerosol: 1 inhaled 4 times a day as needed for  shortness of breath and/or wheezing (04 Jun 2025 14:05)  Fasenra Prefilled Syringe 30 mg/mL subcutaneous solution: 30 milligram(s) subcutaneously every 4 weeks . Next dose planned for Friday 6/6/25 (04 Jun 2025 14:05)  Flonase 50 mcg/inh nasal spray: 1 spray(s) in each nostril once a day (04 Jun 2025 14:05)  montelukast 10 mg oral tablet: 1 tab(s) orally once a day (04 Jun 2025 14:05)  Remodulin 5 mg/mL injectable solution: 1 application injectable once a day via her own SQ PUMP (04 Jun 2025 14:05)  semaglutide 1 mg/0.5 mL (1 mg dose) subcutaneous solution: 1 milligram(s) subcutaneously once a week on Fridays (04 Jun 2025 14:05)    Allergies: Allergies    vancomycin (Rash)  adhesives (Rash)    Intolerances        REVIEW OF SYSTEMS    [x] A ten-point review of systems was otherwise negative except as noted.  [ ] Due to altered mental status/intubation, subjective information were not able to be obtained from the patient. History was obtained, to the extent possible, from review of the chart and collateral sources of information.      CURRENT MEDICATIONS:   Neurologic Medications  acetaminophen     Tablet .. 650 milliGRAM(s) Oral every 6 hours PRN Mild Pain (1 - 3), Moderate Pain (4 - 6)  diphenhydrAMINE 25 milliGRAM(s) Oral every 6 hours PRN Rash and/or Itching  HYDROmorphone  Injectable 0.5 milliGRAM(s) IV Push every 6 hours PRN Severe Pain (7 - 10)  oxyCODONE    IR 5 milliGRAM(s) Oral every 4 hours PRN Moderate Pain (4 - 6)  oxyCODONE    IR 5 milliGRAM(s) Oral once    Respiratory Medications  cetirizine 5 milliGRAM(s) Oral daily    Cardiovascular Medications    Gastrointestinal Medications  pantoprazole    Tablet 40 milliGRAM(s) Oral before breakfast  potassium chloride    Tablet ER 20 milliEquivalent(s) Oral once    Genitourinary Medications    Hematologic/Oncologic Medications    Antimicrobial/Immunologic Medications  DAPTOmycin IVPB 600 milliGRAM(s) IV Intermittent <User Schedule>  ertapenem  IVPB 1000 milliGRAM(s) IV Intermittent every 24 hours    Endocrine/Metabolic Medications  predniSONE   Tablet 5 milliGRAM(s) Oral daily    Topical/Other Medications  chlorhexidine 2% Cloths 1 Application(s) Topical daily  fluticasone propionate 50 MICROgram(s)/spray Nasal Spray 1 Spray(s) Both Nostrils two times a day  mupirocin 2% Nasal 1 Application(s) Both Nostrils two times a day  Treprostinil (Remodulin) subcutaneous continuous infusion 51 NANOgrams/kg/min 1 Pump(s) 1 Each SubCutaneous Continuous Pump  triamcinolone 0.1% Cream 1 Application(s) Topical two times a day      INS/OUTS (last 24 hours):  I&O's Summary    04 Jun 2025 07:01  -  05 Jun 2025 07:00  --------------------------------------------------------  IN: 1010 mL / OUT: 1400 mL / NET: -390 mL    05 Jun 2025 07:01  -  05 Jun 2025 11:34  --------------------------------------------------------  IN: 0 mL / OUT: 300 mL / NET: -300 mL      --------------------------------------------------------------------------------------    PHYSICAL EXAM:  GENERAL: NAD, resting comfortably in bed  HEAD:  Atraumatic, normocephalic  EYES: EOMI, conjunctiva and sclera clear  CHEST/LUNG: CTA B/L, good inspiratory effort  HEART: RRR. +S1/S2  ABDOMEN: Soft, nondistended, nontender to palpation  EXTREMITIES: No clubbing, cyanosis, or edema  PSYCH: A&O x3  NEUROLOGY: Non-focal, motor & sensory grossly intact  SKIN: Warm & dry, no rashes or lesions    LABS:    06-04    139  |  104  |  7   ----------------------------<  97  4.4   |  23  |  0.77    Ca    8.5      04 Jun 2025 06:07        Urinalysis Basic - ( 04 Jun 2025 06:07 )    Color: x / Appearance: x / SG: x / pH: x  Gluc: 97 mg/dL / Ketone: x  / Bili: x / Urobili: x   Blood: x / Protein: x / Nitrite: x   Leuk Esterase: x / RBC: x / WBC x   Sq Epi: x / Non Sq Epi: x / Bacteria: x        CAPILLARY BLOOD GLUCOSE          RADIOLOGY:

## 2025-06-05 NOTE — PROGRESS NOTE ADULT - PROBLEM SELECTOR PLAN 1
Has a history of abscesses notably abdominal (grown MRSA and pseudomonas in the past). Current location LUE.   -s/p I&D, f/u culture - MRSA and ESBL Klebsiella growing.   >    > 6/1 : blood cx + GPC, MRSA pcr pos > s/p Ceftriaxone per ID recs. -repeat blood cx x2 from 6/2 - NGTD. -Check TTE pending read. Check LUE CT with IV contrast -f/u ID recs if further imaging or mgmt needed.   -Abx now dapto and ertapenem.   - on Dapto, s/p Azithro per ID  - F/U ID team for further recs  -MRSA bacteremia. F/u repeat blood cultures - 6/2 cultures NGTD. -s/p PPM removal 6/3 by EP. -MYRIAM intraop per EP.   -tele.  -Fluconazole 150mg po x 1 for vaginal yeast infection. Probiotics added. Has a history of abscesses notably abdominal (grown MRSA and pseudomonas in the past). Current location LUE.   -s/p I&D, f/u culture - MRSA and ESBL Klebsiella growing.   >    > 6/1 : blood cx + GPC, MRSA pcr pos > s/p Ceftriaxone per ID recs. -repeat blood cx x2 from 6/2 - NGTD. -Check TTE pending read. Check LUE CT with IV contrast -f/u ID recs if further imaging or mgmt needed.   -Abx now dapto and ertapenem.   - on Dapto, s/p Azithro per ID  - F/U ID team for further recs  -MRSA bacteremia. F/u repeat blood cultures - 6/2 cultures NGTD. -s/p PPM removal 6/3 by EP. -MYRIAM intraop per EP.   -Pain control prn. Ice packs.   -Tele.  -Fluconazole 150mg po x 1 for vaginal yeast infection. Probiotics added.

## 2025-06-05 NOTE — PROGRESS NOTE ADULT - SUBJECTIVE AND OBJECTIVE BOX
[FreeTextEntry1] : 48-year-old male with a 3 mm left UVJ stone which he has passed. Will send for stone analysis. We reviewed his CT which demonstrates an indeterminate right renal cyst. He will get a CT abd with and without contrast to better evaluate.  Plan Urine culture from 2/28 reviewed and negative Reviewed CMP from 2/28: Cr 1.15 Reviewed images of patient's CT abd/pelvis and discussed results with patient demonstrating a 3 mm left uvj stone with minimal hydronephrosis and 2.3 cm right indeterminate renal cyst Stone sent for analysis CT abd with and without contrast to better assess indeterminate lesion FU in 4 weeks to discuss stone analysis, stone prevention, and CT results   Patient is being seen today for evaluation and management of a chronic and longitudinal ongoing condition and I am of the primary treating physician.  Patient is a 44y old  Female who presents with a chief complaint of Abscess (05 Jun 2025 13:08)        SUBJECTIVE / OVERNIGHT EVENTS: reports pain and soreness in left chest wall near prior PPM site.       MEDICATIONS  (STANDING):  cetirizine 5 milliGRAM(s) Oral daily  chlorhexidine 2% Cloths 1 Application(s) Topical daily  DAPTOmycin IVPB 600 milliGRAM(s) IV Intermittent <User Schedule>  ertapenem  IVPB 1000 milliGRAM(s) IV Intermittent every 24 hours  fluticasone propionate 50 MICROgram(s)/spray Nasal Spray 1 Spray(s) Both Nostrils two times a day  mupirocin 2% Nasal 1 Application(s) Both Nostrils two times a day  oxyCODONE    IR 5 milliGRAM(s) Oral once  pantoprazole    Tablet 40 milliGRAM(s) Oral before breakfast  potassium chloride    Tablet ER 20 milliEquivalent(s) Oral once  predniSONE   Tablet 5 milliGRAM(s) Oral daily  Treprostinil (Remodulin) subcutaneous continuous infusion 51 NANOgrams/kg/min 1 Pump(s) 1 Each SubCutaneous Continuous Pump  triamcinolone 0.1% Cream 1 Application(s) Topical two times a day    MEDICATIONS  (PRN):  acetaminophen     Tablet .. 650 milliGRAM(s) Oral every 6 hours PRN Mild Pain (1 - 3), Moderate Pain (4 - 6)  diphenhydrAMINE 50 milliGRAM(s) Oral every 4 hours PRN Rash and/or Itching  HYDROmorphone  Injectable 0.5 milliGRAM(s) IV Push every 4 hours PRN Severe Pain (7 - 10)  oxyCODONE    IR 5 milliGRAM(s) Oral every 4 hours PRN Moderate Pain (4 - 6)      Vital Signs Last 24 Hrs  T(C): 36.8 (05 Jun 2025 11:47), Max: 36.8 (05 Jun 2025 11:47)  T(F): 98.2 (05 Jun 2025 11:47), Max: 98.2 (05 Jun 2025 11:47)  HR: 106 (05 Jun 2025 12:00) (90 - 122)  BP: 141/72 (05 Jun 2025 11:47) (112/69 - 141/72)  BP(mean): --  RR: 19 (05 Jun 2025 11:47) (18 - 19)  SpO2: 93% (05 Jun 2025 11:47) (93% - 97%)    Parameters below as of 05 Jun 2025 11:47  Patient On (Oxygen Delivery Method): nasal cannula  O2 Flow (L/min): 2    CAPILLARY BLOOD GLUCOSE        I&O's Summary    04 Jun 2025 07:01  -  05 Jun 2025 07:00  --------------------------------------------------------  IN: 1010 mL / OUT: 1400 mL / NET: -390 mL    05 Jun 2025 07:01  -  05 Jun 2025 18:33  --------------------------------------------------------  IN: 1030 mL / OUT: 2000 mL / NET: -970 mL          PHYSICAL EXAM:   GENERAL: NAD, well-developed  HEAD:  Atraumatic, Normocephalic  EYES:  conjunctiva and sclera clear  NECK: Supple,  CHEST/LUNG: distant bs  HEART: S1S2 normal. Regular rate and rhythm; No murmurs, rubs, or gallops  ABDOMEN: Soft, Nontender, Nondistended; Bowel sounds present  EXTREMITIES:  mild LE edema  PSYCH/Neuro: AAOx3. Non-focal.   SKIN: left upper chest wall mild swelling and tender.       LABS:    06-04    139  |  104  |  7   ----------------------------<  97  4.4   |  23  |  0.77    Ca    8.5      04 Jun 2025 06:07            Urinalysis Basic - ( 04 Jun 2025 06:07 )    Color: x / Appearance: x / SG: x / pH: x  Gluc: 97 mg/dL / Ketone: x  / Bili: x / Urobili: x   Blood: x / Protein: x / Nitrite: x   Leuk Esterase: x / RBC: x / WBC x   Sq Epi: x / Non Sq Epi: x / Bacteria: x          RADIOLOGY & ADDITIONAL TESTS:    Imaging Personally Reviewed: ct  Consultant(s) Notes Reviewed:  transplant pulm  Care Discussed with Consultants/Other Providers:   Partially impaired: cannot see medication labels or newsprint, but can see obstacles in path, and the surrounding layout; can count fingers at arm's length

## 2025-06-05 NOTE — PROGRESS NOTE ADULT - PROBLEM SELECTOR PLAN 2
-off of xarelto, nifedipine, diuretics  -continue remodulin - f/u with pulm/ID if pump needs to be adjusted with recurrent skin infections. -They are looking into this for outpatient.   -Allergy appreciated; Zyrtec daily was added. Can try different adhesives. -Benadryl 50mg po q4h prn.   -continue atrovent  -continue NC  -Transplant candidacy reviewed, currently evaluation closed until BMI closer to target in addition to recurrent infections more under control. -Resume Ozempic once able. -Outpatient weight loss clinic f/u.   - F/U transplant pulm team for further recs -off of xarelto, nifedipine, diuretics  -continue remodulin - f/u with pulm/ID if pump needs to be adjusted with recurrent skin infections. -They are looking into this for outpatient.   -Allergy appreciated; Zyrtec daily was added. Can try different adhesives. -Benadryl 50mg po q4h prn.   -continue atrovent  -continue NC  -Transplant candidacy reviewed, currently evaluation closed until BMI closer to target in addition to recurrent infections more under control. -Resume Ozempic once able. -Outpatient weight loss clinic f/u.   - F/U transplant pulm team for further recs  -Lasix 20mg IV x 1 today given for LE edema per pulm.

## 2025-06-06 ENCOUNTER — APPOINTMENT (OUTPATIENT)
Dept: PULMONOLOGY | Facility: CLINIC | Age: 45
End: 2025-06-06

## 2025-06-06 ENCOUNTER — RESULT REVIEW (OUTPATIENT)
Age: 45
End: 2025-06-06

## 2025-06-06 LAB
ANION GAP SERPL CALC-SCNC: 15 MMOL/L — SIGNIFICANT CHANGE UP (ref 5–17)
BUN SERPL-MCNC: 8 MG/DL — SIGNIFICANT CHANGE UP (ref 7–23)
CALCIUM SERPL-MCNC: 8.2 MG/DL — LOW (ref 8.4–10.5)
CHLORIDE SERPL-SCNC: 101 MMOL/L — SIGNIFICANT CHANGE UP (ref 96–108)
CO2 SERPL-SCNC: 20 MMOL/L — LOW (ref 22–31)
CREAT SERPL-MCNC: 0.81 MG/DL — SIGNIFICANT CHANGE UP (ref 0.5–1.3)
EGFR: 92 ML/MIN/1.73M2 — SIGNIFICANT CHANGE UP
EGFR: 92 ML/MIN/1.73M2 — SIGNIFICANT CHANGE UP
GLUCOSE SERPL-MCNC: 67 MG/DL — LOW (ref 70–99)
HCT VFR BLD CALC: 36.5 % — SIGNIFICANT CHANGE UP (ref 34.5–45)
HGB BLD-MCNC: 11.4 G/DL — LOW (ref 11.5–15.5)
MCHC RBC-ENTMCNC: 22.9 PG — LOW (ref 27–34)
MCHC RBC-ENTMCNC: 31.2 G/DL — LOW (ref 32–36)
MCV RBC AUTO: 73.4 FL — LOW (ref 80–100)
NRBC BLD AUTO-RTO: 0 /100 WBCS — SIGNIFICANT CHANGE UP (ref 0–0)
PLATELET # BLD AUTO: 168 K/UL — SIGNIFICANT CHANGE UP (ref 150–400)
POTASSIUM SERPL-MCNC: 4.1 MMOL/L — SIGNIFICANT CHANGE UP (ref 3.5–5.3)
POTASSIUM SERPL-SCNC: 4.1 MMOL/L — SIGNIFICANT CHANGE UP (ref 3.5–5.3)
RBC # BLD: 4.97 M/UL — SIGNIFICANT CHANGE UP (ref 3.8–5.2)
RBC # FLD: 17.7 % — HIGH (ref 10.3–14.5)
SODIUM SERPL-SCNC: 136 MMOL/L — SIGNIFICANT CHANGE UP (ref 135–145)
WBC # BLD: 10.26 K/UL — SIGNIFICANT CHANGE UP (ref 3.8–10.5)
WBC # FLD AUTO: 10.26 K/UL — SIGNIFICANT CHANGE UP (ref 3.8–10.5)

## 2025-06-06 PROCEDURE — 99232 SBSQ HOSP IP/OBS MODERATE 35: CPT

## 2025-06-06 PROCEDURE — 99232 SBSQ HOSP IP/OBS MODERATE 35: CPT | Mod: GC

## 2025-06-06 PROCEDURE — 93306 TTE W/DOPPLER COMPLETE: CPT | Mod: 26

## 2025-06-06 RX ORDER — LIDOCAINE HYDROCHLORIDE 20 MG/ML
1 JELLY TOPICAL DAILY
Refills: 0 | Status: DISCONTINUED | OUTPATIENT
Start: 2025-06-06 | End: 2025-06-11

## 2025-06-06 RX ADMIN — Medication 0.5 MILLIGRAM(S): at 17:06

## 2025-06-06 RX ADMIN — Medication 40 MILLIGRAM(S): at 05:34

## 2025-06-06 RX ADMIN — ERTAPENEM SODIUM 100 MILLIGRAM(S): 1 INJECTION, POWDER, LYOPHILIZED, FOR SOLUTION INTRAMUSCULAR; INTRAVENOUS at 17:05

## 2025-06-06 RX ADMIN — Medication 1 APPLICATION(S): at 11:02

## 2025-06-06 RX ADMIN — Medication 0.5 MILLIGRAM(S): at 05:36

## 2025-06-06 RX ADMIN — Medication 50 MILLIGRAM(S): at 12:22

## 2025-06-06 RX ADMIN — DAPTOMYCIN 124 MILLIGRAM(S): 500 INJECTION, POWDER, LYOPHILIZED, FOR SOLUTION INTRAVENOUS at 21:38

## 2025-06-06 RX ADMIN — Medication 0.5 MILLIGRAM(S): at 12:15

## 2025-06-06 RX ADMIN — Medication 500 MICROGRAM(S): at 23:05

## 2025-06-06 RX ADMIN — FLUTICASONE PROPIONATE 1 SPRAY(S): 50 SPRAY, METERED NASAL at 05:35

## 2025-06-06 RX ADMIN — Medication 0.5 MILLIGRAM(S): at 00:32

## 2025-06-06 RX ADMIN — PREDNISONE 5 MILLIGRAM(S): 20 TABLET ORAL at 05:34

## 2025-06-06 RX ADMIN — FLUTICASONE PROPIONATE 1 SPRAY(S): 50 SPRAY, METERED NASAL at 17:05

## 2025-06-06 RX ADMIN — Medication 0.5 MILLIGRAM(S): at 07:14

## 2025-06-06 RX ADMIN — Medication 500 MICROGRAM(S): at 10:58

## 2025-06-06 RX ADMIN — Medication 0.5 MILLIGRAM(S): at 11:00

## 2025-06-06 RX ADMIN — TRIAMCINOLONE ACETONIDE 1 APPLICATION(S): 1 CREAM TOPICAL at 17:05

## 2025-06-06 RX ADMIN — Medication 1 TABLET(S): at 17:05

## 2025-06-06 RX ADMIN — TRIAMCINOLONE ACETONIDE 1 APPLICATION(S): 1 CREAM TOPICAL at 05:36

## 2025-06-06 RX ADMIN — Medication 5 MILLIGRAM(S): at 11:00

## 2025-06-06 RX ADMIN — Medication 0.5 MILLIGRAM(S): at 18:07

## 2025-06-06 RX ADMIN — Medication 0.5 MILLIGRAM(S): at 21:38

## 2025-06-06 RX ADMIN — Medication 0.5 MILLIGRAM(S): at 23:00

## 2025-06-06 RX ADMIN — Medication 1 TABLET(S): at 08:40

## 2025-06-06 NOTE — PROGRESS NOTE ADULT - ASSESSMENT
44 year old female, PMH pHTN (on treprostinil SQ pump) currently on lung transplant list, HF (s/p PPM dual chamber 2016), chronic PE (dx 2017) no longer on DOAC, SVT (s/p ablation 05/2020), asthma, with recurrent abscesses (abdominal wall, UE, most recently MRSA) p/w upper extremity abscess.    #MRSA bacteremia  --Continue daptomycin 600 mg IV every 24 hours  --please obtain TTE to evaluate valves- no evidence of vegetations  --will plan for a prolonged course given high grade bacteremia    #Skin Abscess, Positive culture finding  --Continue daptomycin as above  --klebsiella ESBL in wound completing ertapenem for 5-7days    --6/1/25 creatine kinase =40 next due 6/8  --Follow up on wound culture of forearm abscess shows ESBL klebsiella and abx changed to ertapenem    #Human metapneumovirus infection  --Supportive care  --Contact isolation per infection control policy    #Pre-Lung Transplant Evaluation  COVID19 Nucleocapsid Antibody Negative  COVID19 Rajinder Antibody Positive  HAV IgG Negative  HBVs Ab Negative  HBVsAg Negative  HBVc Ab Negative  HCV Ab Negative  HSV 1 IgG Positive  HSV 2 IgG Negative  EBV IgG Positive  CMV IgG Positive  VZV IgG Positive  Measles IgG Positive  Mumps IgG Positive  Rubella IgG Positive  Quantiferon Gold Negative  HIV Ag/Ab by CMIA Negative  Syphilis Screen Negative  Toxoplasma IgG Negative  Strongyloides Ab Negative  Trypanosoma cruzi Ab Negative  --ID clearance for lung transplant pending resolution of LUE arm abscess and treatment of MRSA bacteremia    #Encounter to Vaccinate Patient  COVID19: Would benefit from COVID19 [id number] Vaccine Dose  Influenza: needs this next season ,   RSV: Arexvy 3/6/25  Pneumococcal: States she had pneumococcal vaccination ~2022  HAV: Would benefit from Havrix  HBV: Heplisav Dose 1 3/6/25. Dose 2 of Heplisav ordered  MMR: Rubella IgG pending. Mumps and Measles immune  Varicella: Immune will not require further vaccination  Shingles: Will recommend Shingrix  Tdap: Will recommend Tdap    Once she completes therapy will try to decolonize with mupiricin and chlorhexidine    Interval events, labs, documents, radiology, microbiology and infection control needs reviewed    Sreekanth Jauregui MD  Can be called via Teams  After 5pm/weekends [phone]

## 2025-06-06 NOTE — PROGRESS NOTE ADULT - SUBJECTIVE AND OBJECTIVE BOX
Interval Events: NAEON    PHYSICAL EXAM:  General: nad  HEENT: MMM, PERRLA  Respiratory: ctab  Cardiovascular: 3/6 holosystolic murmur , RRR  Abdomen: NTND  Extremities: warm, abscess site dressed and clean  Neurological: AOx3, no gross     REVIEW OF SYSTEMS:  All systems negative unless described below:    OBJECTIVE:  ICU Vital Signs Last 24 Hrs  T(C): 36.8 (06 Jun 2025 05:23), Max: 36.8 (05 Jun 2025 11:47)  T(F): 98.2 (06 Jun 2025 05:23), Max: 98.2 (05 Jun 2025 11:47)  HR: 103 (06 Jun 2025 05:36) (102 - 122)  BP: 105/67 (06 Jun 2025 05:36) (98/65 - 141/72)  BP(mean): --  ABP: --  ABP(mean): --  RR: 18 (06 Jun 2025 07:21) (18 - 19)  SpO2: 95% (06 Jun 2025 07:21) (93% - 97%)    O2 Parameters below as of 06 Jun 2025 07:21  Patient On (Oxygen Delivery Method): nasal cannula  O2 Flow (L/min): 2            06-05 @ 07:01  -  06-06 @ 07:00  --------------------------------------------------------  IN: 1030 mL / OUT: 2200 mL / NET: -1170 mL    06-06 @ 07:01  -  06-06 @ 09:23  --------------------------------------------------------  IN: 240 mL / OUT: 0 mL / NET: 240 mL      CAPILLARY BLOOD GLUCOSE              HOSPITAL MEDICATIONS:    DAPTOmycin IVPB 600 milliGRAM(s) IV Intermittent <User Schedule>  ertapenem  IVPB 1000 milliGRAM(s) IV Intermittent every 24 hours        cetirizine 5 milliGRAM(s) Oral daily    acetaminophen     Tablet .. 650 milliGRAM(s) Oral every 6 hours PRN  diphenhydrAMINE 50 milliGRAM(s) Oral every 4 hours PRN  HYDROmorphone  Injectable 0.5 milliGRAM(s) IV Push every 4 hours PRN  oxyCODONE    IR 5 milliGRAM(s) Oral every 4 hours PRN  oxyCODONE    IR 5 milliGRAM(s) Oral once    pantoprazole    Tablet 40 milliGRAM(s) Oral before breakfast        potassium chloride    Tablet ER 20 milliEquivalent(s) Oral once      chlorhexidine 2% Cloths 1 Application(s) Topical daily  fluticasone propionate 50 MICROgram(s)/spray Nasal Spray 1 Spray(s) Both Nostrils two times a day  mupirocin 2% Nasal 1 Application(s) Both Nostrils two times a day  triamcinolone 0.1% Cream 1 Application(s) Topical two times a day    lactobacillus acidophilus 1 Tablet(s) Oral two times a day with meals  Treprostinil (Remodulin) subcutaneous continuous infusion 51 NANOgrams/kg/min 1 Pump(s) 1 Each SubCutaneous Continuous Pump      LABS:                        11.4   10.26 )-----------( 168      ( 06 Jun 2025 07:13 )             36.5     Hgb Trend: 11.4<--, 11.6<--, 11.8<--, 13.0<--  06-06    136  |  101  |  8   ----------------------------<  67[L]  4.1   |  20[L]  |  0.81    Ca    8.2[L]      06 Jun 2025 07:13      Creatinine Trend: 0.81<--, 0.77<--, 0.72<--, 0.74<--, 0.97<--    Urinalysis Basic - ( 06 Jun 2025 07:13 )    Color: x / Appearance: x / SG: x / pH: x  Gluc: 67 mg/dL / Ketone: x  / Bili: x / Urobili: x   Blood: x / Protein: x / Nitrite: x   Leuk Esterase: x / RBC: x / WBC x   Sq Epi: x / Non Sq Epi: x / Bacteria: x            MICROBIOLOGY:     RADIOLOGY:  [x] Reviewed and interpreted by me

## 2025-06-06 NOTE — PROGRESS NOTE ADULT - ASSESSMENT
44F w/ PMH of pHTN on Remodulin sq, UE DVT on Xarelto, Asthma, CHF  She is also following with transplant pulmonary though currently not a candidate.  Comes to the ED with concern with concern for UE abscess, S/p I&D    # pHTN  Patient has a known history of PAH on remodulin. Also one O2 at home, 2-3lpm. Tentative plan to transition from remodulin to winrevair or add it as adjunct soon per Dr Yoon, but not during current state of infection. Appears stable from baseline. Previously also on Xarelto, Bumex, Aldactone  - continue home Remodulin 51ng/kg/min on home pump  - monitor volume status, diurese as needed to keep net even  - monitor K, Na, Mg  - appreciate allergy recs    - 3/6 holosystolic murmur present, ISO MRSA bacteremia -- cannot find MYRIAM report, but supposedly done during PPM extraction.   - cont w/ pain control, please add lidocaine patch on anterior chest to permit proper inspiration to prevent atelectasis  - daily assessment of need for diuretic dose  - incentive spirometer    # asthma  Patient with known asthma, On Fasenra. PFTs from 1/2025 w/ severe obstruction, low DLCO.  - continue home equivalent ipratropium, levalbuterol (off budesonide due to facial rash)  - continue flonase  - PRN levalbuterol for wheezing, q6h

## 2025-06-06 NOTE — PROGRESS NOTE ADULT - SUBJECTIVE AND OBJECTIVE BOX
Patient is a 44y old  Female who presents with a chief complaint of Abscess (06 Jun 2025 18:54)        SUBJECTIVE / OVERNIGHT EVENTS: reports pain a little better. le swelling down.      MEDICATIONS  (STANDING):  cetirizine 5 milliGRAM(s) Oral daily  chlorhexidine 2% Cloths 1 Application(s) Topical daily  DAPTOmycin IVPB 600 milliGRAM(s) IV Intermittent <User Schedule>  ertapenem  IVPB 1000 milliGRAM(s) IV Intermittent every 24 hours  fluticasone propionate 50 MICROgram(s)/spray Nasal Spray 1 Spray(s) Both Nostrils two times a day  ipratropium    for Nebulization 500 MICROGram(s) Nebulizer every 6 hours  lactobacillus acidophilus 1 Tablet(s) Oral two times a day with meals  lidocaine   4% Patch 1 Patch Transdermal daily  mupirocin 2% Nasal 1 Application(s) Both Nostrils two times a day  oxyCODONE    IR 5 milliGRAM(s) Oral once  pantoprazole    Tablet 40 milliGRAM(s) Oral before breakfast  potassium chloride    Tablet ER 20 milliEquivalent(s) Oral once  Treprostinil (Remodulin) subcutaneous continuous infusion 51 NANOgrams/kg/min 1 Pump(s) 1 Each SubCutaneous Continuous Pump  triamcinolone 0.1% Cream 1 Application(s) Topical two times a day    MEDICATIONS  (PRN):  acetaminophen     Tablet .. 650 milliGRAM(s) Oral every 6 hours PRN Mild Pain (1 - 3), Moderate Pain (4 - 6)  diphenhydrAMINE 50 milliGRAM(s) Oral every 4 hours PRN Rash and/or Itching  HYDROmorphone  Injectable 0.5 milliGRAM(s) IV Push every 4 hours PRN Severe Pain (7 - 10)  oxyCODONE    IR 5 milliGRAM(s) Oral every 4 hours PRN Moderate Pain (4 - 6)      Vital Signs Last 24 Hrs  T(C): 36.6 (06 Jun 2025 11:32), Max: 36.8 (05 Jun 2025 20:19)  T(F): 97.8 (06 Jun 2025 11:32), Max: 98.2 (05 Jun 2025 20:19)  HR: 104 (06 Jun 2025 11:32) (102 - 118)  BP: 103/65 (06 Jun 2025 11:32) (98/65 - 110/67)  BP(mean): --  RR: 18 (06 Jun 2025 11:32) (18 - 19)  SpO2: 96% (06 Jun 2025 11:32) (93% - 97%)    Parameters below as of 06 Jun 2025 11:32  Patient On (Oxygen Delivery Method): nasal cannula  O2 Flow (L/min): 2    CAPILLARY BLOOD GLUCOSE        I&O's Summary    05 Jun 2025 07:01  -  06 Jun 2025 07:00  --------------------------------------------------------  IN: 1030 mL / OUT: 2200 mL / NET: -1170 mL    06 Jun 2025 07:01  -  06 Jun 2025 19:51  --------------------------------------------------------  IN: 720 mL / OUT: 0 mL / NET: 720 mL          PHYSICAL EXAM:   GENERAL: NAD, well-developed  HEAD:  Atraumatic, Normocephalic  EYES:   conjunctiva and sclera clear  NECK: Supple   CHEST/LUNG: Clear to auscultation bilaterally; No wheeze  HEART: S1S2 normal. Regular rate and rhythm; No murmurs, rubs, or gallops  ABDOMEN: Soft, Nontender, Nondistended; Bowel sounds present  EXTREMITIES:  trace LE edema  PSYCH/Neuro: AAOx3. Non-focal.   SKIN: left chest wall ppm site clean mild swelling.       LABS:                        11.4   10.26 )-----------( 168      ( 06 Jun 2025 07:13 )             36.5     06-06    136  |  101  |  8   ----------------------------<  67[L]  4.1   |  20[L]  |  0.81    Ca    8.2[L]      06 Jun 2025 07:13            Urinalysis Basic - ( 06 Jun 2025 07:13 )    Color: x / Appearance: x / SG: x / pH: x  Gluc: 67 mg/dL / Ketone: x  / Bili: x / Urobili: x   Blood: x / Protein: x / Nitrite: x   Leuk Esterase: x / RBC: x / WBC x   Sq Epi: x / Non Sq Epi: x / Bacteria: x      < from: TTE W or WO Ultrasound Enhancing Agent (06.06.25 @ 08:49) >  CONCLUSIONS:      1. Left ventricular cavity is normal in size. The interventricular septum is flattened in diastole ('D' shaped left ventricle) consistent with right ventricular volume overload. Left ventricular systolic function is hyperdynamic with an ejection fraction visually estimated at 70 to 75 %.   2. Normal right ventricular cavity size and normal right ventricular systolic function.   3. Mild to moderate pulmonic regurgitation.   4. Trace pericardial effusion.   5. Compared to the transesophageal echocardiogram performed on 4/16/2025, there have been no significant interval changes.    < end of copied text >      RADIOLOGY & ADDITIONAL TESTS:    Imaging Personally Reviewed: TTE report  Consultant(s) Notes Reviewed:  pulm  Care Discussed with Consultants/Other Providers:

## 2025-06-06 NOTE — PROGRESS NOTE ADULT - SUBJECTIVE AND OBJECTIVE BOX
INFECTIOUS DISEASES FOLLOW UP-- Aleja Jauregui  [phone]    This is a follow up note for this  44yFemale with  Cutaneous abscess of left upper extremity        ROS:  CONSTITUTIONAL:  No fever, good appetite  CARDIOVASCULAR:  No chest pain or palpitations  RESPIRATORY:  No dyspnea  GASTROINTESTINAL:  No nausea, vomiting, diarrhea, or abdominal pain  GENITOURINARY:  No dysuria  NEUROLOGIC:  No headache,     Allergies    vancomycin (Rash)  adhesives (Rash)    Intolerances        ANTIBIOTICS/RELEVANT:  antimicrobials  DAPTOmycin IVPB 600 milliGRAM(s) IV Intermittent <User Schedule>  ertapenem  IVPB 1000 milliGRAM(s) IV Intermittent every 24 hours    immunologic:    OTHER:  acetaminophen     Tablet .. 650 milliGRAM(s) Oral every 6 hours PRN  cetirizine 5 milliGRAM(s) Oral daily  chlorhexidine 2% Cloths 1 Application(s) Topical daily  diphenhydrAMINE 50 milliGRAM(s) Oral every 4 hours PRN  fluticasone propionate 50 MICROgram(s)/spray Nasal Spray 1 Spray(s) Both Nostrils two times a day  HYDROmorphone  Injectable 0.5 milliGRAM(s) IV Push every 4 hours PRN  ipratropium    for Nebulization 500 MICROGram(s) Nebulizer every 6 hours  lactobacillus acidophilus 1 Tablet(s) Oral two times a day with meals  lidocaine   4% Patch 1 Patch Transdermal daily  mupirocin 2% Nasal 1 Application(s) Both Nostrils two times a day  oxyCODONE    IR 5 milliGRAM(s) Oral every 4 hours PRN  oxyCODONE    IR 5 milliGRAM(s) Oral once  pantoprazole    Tablet 40 milliGRAM(s) Oral before breakfast  potassium chloride    Tablet ER 20 milliEquivalent(s) Oral once  Treprostinil (Remodulin) subcutaneous continuous infusion 51 NANOgrams/kg/min 1 Pump(s) 1 Each SubCutaneous Continuous Pump  triamcinolone 0.1% Cream 1 Application(s) Topical two times a day      Objective:  Vital Signs Last 24 Hrs  T(C): 36.6 (06 Jun 2025 11:32), Max: 36.8 (05 Jun 2025 20:19)  T(F): 97.8 (06 Jun 2025 11:32), Max: 98.2 (05 Jun 2025 20:19)  HR: 104 (06 Jun 2025 11:32) (102 - 118)  BP: 103/65 (06 Jun 2025 11:32) (98/65 - 110/67)  BP(mean): --  RR: 18 (06 Jun 2025 11:32) (18 - 19)  SpO2: 96% (06 Jun 2025 11:32) (93% - 97%)    Parameters below as of 06 Jun 2025 11:32  Patient On (Oxygen Delivery Method): nasal cannula  O2 Flow (L/min): 2      PHYSICAL EXAM:  Constitutional:no acute distress  Eyes:DEMETRIUS, EOMI  Ear/Nose/Throat: no oral lesions, 	  Respiratory: clear BL  Cardiovascular: S1S2  Gastrointestinal:soft, (+) BS, no tenderness  Extremities:no  LUE ace bandage wrapped  No Lymphadenopathy  IV sites not inflammed.    LABS:                        11.4   10.26 )-----------( 168      ( 06 Jun 2025 07:13 )             36.5     06-06    136  |  101  |  8   ----------------------------<  67[L]  4.1   |  20[L]  |  0.81    Ca    8.2[L]      06 Jun 2025 07:13        Urinalysis Basic - ( 06 Jun 2025 07:13 )    Color: x / Appearance: x / SG: x / pH: x  Gluc: 67 mg/dL / Ketone: x  / Bili: x / Urobili: x   Blood: x / Protein: x / Nitrite: x   Leuk Esterase: x / RBC: x / WBC x   Sq Epi: x / Non Sq Epi: x / Bacteria: x        MICROBIOLOGY:            RECENT CULTURES:  06-02 @ 07:16  Blood Blood-Peripheral  --  --  --    No growth at 4 days  --  05-31 @ 00:10  Blood Blood-Peripheral  Blood Culture PCR  Methicillin resistant Staphylococcus aureus  Blood Culture PCR  PCR    Growth in anaerobic bottle: Methicillin Resistant Staphylococcus aureus  Direct identification is available within approximately 3-5  hours either by Blood Panel Multiplexed PCR or Direct  MALDI-TOF. Details: [email or web address]  --  05-30 @ 23:49  Incision Incision  Methicillin resistant Staphylococcus aureus  Klebsiella pneumoniae ESBL  Methicillin resistant Staphylococcus aureus  BRIAN    Numerous Methicillin Resistant Staphylococcus aureus  Numerous Klebsiella pneumoniae ESBL  --      RADIOLOGY & ADDITIONAL STUDIES:    < from: CT Upper Extremity w/ IV Cont, Left (06.03.25 @ 20:21) >  IMPRESSION:    CT of the left upper extremity demonstrates scattered areas of edema and   nodular foci within example at the subcutaneous tissues at the level of   the midhumerus along the skin surface posteriorly measuring up to 1.4 cm.   Finding is nonspecific and may be inflammatory/infectious in etiology.   Recommend correlation with physical exam and consideration of focused   ultrasound.    Subcutaneous stranding and foci of gas along the partial included   anterior chest wall, finding may be iatrogenic from recent vascular   access, however, recommend correlation with patient history and physical   exam.  < from: TTE W or WO Ultrasound Enhancing Agent (06.06.25 @ 08:49) >    CONCLUSIONS:      1. Left ventricular cavity is normal in size. The interventricular septum is flattened in diastole ('D' shaped left ventricle) consistent with right ventricular volume overload. Left ventricular systolic function is hyperdynamic with an ejection fraction visually estimated at 70 to 75 %.   2. Normal right ventricular cavity size and normal right ventricular systolic function.   3. Mild to moderate pulmonic regurgitation.   4. Trace pericardial effusion.   5. Compared to the transesophageal echocardiogram performed on 4/16/2025, there have been no significant interval changes.    < end of copied text >    Negative for fracture or dislocation.    --- End of Report ---    < end of copied text >

## 2025-06-06 NOTE — PROGRESS NOTE ADULT - PROBLEM SELECTOR PLAN 1
Has a history of abscesses notably abdominal (grown MRSA and pseudomonas in the past). Current location LUE.   -s/p I&D, f/u culture - MRSA and ESBL Klebsiella growing.   >    > 6/1 : blood cx + GPC, MRSA pcr pos > s/p Ceftriaxone per ID recs. -repeat blood cx x2 from 6/2 - NGTD. -Check TTE - no vegetations seen. Check LUE CT with IV contrast -f/u ID recs if further imaging or mgmt needed.   -Abx now dapto and ertapenem. -Ertapenem 7 day course per ID. -Dapto likely prolonged course.    - on Dapto, s/p Azithro per ID  - F/U ID team for further recs  -MRSA bacteremia. F/u repeat blood cultures - 6/2 cultures NGTD. -s/p PPM removal 6/3 by EP. -MYRIAM intraop per EP - did not mention anything though not much detail in EP procedure report.   -Pain control prn. Ice packs. Lidocaine topical.   -Tele.  -Fluconazole 150mg po x 1 for vaginal yeast infection. Probiotics added.

## 2025-06-06 NOTE — PROGRESS NOTE ADULT - PROBLEM SELECTOR PLAN 2
-off of xarelto, nifedipine, diuretics  -continue remodulin - f/u with pulm/ID if pump needs to be adjusted with recurrent skin infections. -They are looking into this for outpatient - plan to change to winrevair or add it as adjunct soon per Dr Yoon as outpatient.   -Allergy appreciated; Zyrtec daily was added. Can try different adhesives. -Benadryl 50mg po q4h prn.   -continue atrovent  -continue NC  -Transplant candidacy reviewed, currently evaluation closed until BMI closer to target in addition to recurrent infections more under control. -Resume Ozempic once able. -Outpatient weight loss clinic f/u.   - F/U transplant pulm team for further recs  -Lasix 20mg IV x 1 6/5 given for LE edema per pulm.

## 2025-06-07 LAB
ANION GAP SERPL CALC-SCNC: 14 MMOL/L — SIGNIFICANT CHANGE UP (ref 5–17)
BUN SERPL-MCNC: 7 MG/DL — SIGNIFICANT CHANGE UP (ref 7–23)
CALCIUM SERPL-MCNC: 8.3 MG/DL — LOW (ref 8.4–10.5)
CHLORIDE SERPL-SCNC: 100 MMOL/L — SIGNIFICANT CHANGE UP (ref 96–108)
CO2 SERPL-SCNC: 23 MMOL/L — SIGNIFICANT CHANGE UP (ref 22–31)
CREAT SERPL-MCNC: 0.8 MG/DL — SIGNIFICANT CHANGE UP (ref 0.5–1.3)
CULTURE RESULTS: SIGNIFICANT CHANGE UP
CULTURE RESULTS: SIGNIFICANT CHANGE UP
EGFR: 93 ML/MIN/1.73M2 — SIGNIFICANT CHANGE UP
EGFR: 93 ML/MIN/1.73M2 — SIGNIFICANT CHANGE UP
GLUCOSE SERPL-MCNC: 82 MG/DL — SIGNIFICANT CHANGE UP (ref 70–99)
HCT VFR BLD CALC: 35.9 % — SIGNIFICANT CHANGE UP (ref 34.5–45)
HGB BLD-MCNC: 11.3 G/DL — LOW (ref 11.5–15.5)
MCHC RBC-ENTMCNC: 23 PG — LOW (ref 27–34)
MCHC RBC-ENTMCNC: 31.5 G/DL — LOW (ref 32–36)
MCV RBC AUTO: 73.1 FL — LOW (ref 80–100)
NRBC BLD AUTO-RTO: 0 /100 WBCS — SIGNIFICANT CHANGE UP (ref 0–0)
PLATELET # BLD AUTO: 323 K/UL — SIGNIFICANT CHANGE UP (ref 150–400)
POTASSIUM SERPL-MCNC: 3.6 MMOL/L — SIGNIFICANT CHANGE UP (ref 3.5–5.3)
POTASSIUM SERPL-SCNC: 3.6 MMOL/L — SIGNIFICANT CHANGE UP (ref 3.5–5.3)
RBC # BLD: 4.91 M/UL — SIGNIFICANT CHANGE UP (ref 3.8–5.2)
RBC # FLD: 17.4 % — HIGH (ref 10.3–14.5)
SODIUM SERPL-SCNC: 137 MMOL/L — SIGNIFICANT CHANGE UP (ref 135–145)
SPECIMEN SOURCE: SIGNIFICANT CHANGE UP
SPECIMEN SOURCE: SIGNIFICANT CHANGE UP
WBC # BLD: 10.53 K/UL — HIGH (ref 3.8–10.5)
WBC # FLD AUTO: 10.53 K/UL — HIGH (ref 3.8–10.5)

## 2025-06-07 PROCEDURE — 99232 SBSQ HOSP IP/OBS MODERATE 35: CPT

## 2025-06-07 RX ORDER — DIPHENHYDRAMINE HCL 12.5MG/5ML
25 ELIXIR ORAL ONCE
Refills: 0 | Status: COMPLETED | OUTPATIENT
Start: 2025-06-07 | End: 2025-06-07

## 2025-06-07 RX ADMIN — Medication 0.5 MILLIGRAM(S): at 12:23

## 2025-06-07 RX ADMIN — Medication 40 MILLIGRAM(S): at 05:57

## 2025-06-07 RX ADMIN — Medication 1 APPLICATION(S): at 11:07

## 2025-06-07 RX ADMIN — ERTAPENEM SODIUM 100 MILLIGRAM(S): 1 INJECTION, POWDER, LYOPHILIZED, FOR SOLUTION INTRAMUSCULAR; INTRAVENOUS at 17:05

## 2025-06-07 RX ADMIN — Medication 0.5 MILLIGRAM(S): at 22:00

## 2025-06-07 RX ADMIN — Medication 0.5 MILLIGRAM(S): at 21:18

## 2025-06-07 RX ADMIN — Medication 5 MILLIGRAM(S): at 11:06

## 2025-06-07 RX ADMIN — Medication 1 TABLET(S): at 17:06

## 2025-06-07 RX ADMIN — Medication 0.5 MILLIGRAM(S): at 05:57

## 2025-06-07 RX ADMIN — Medication 0.5 MILLIGRAM(S): at 18:35

## 2025-06-07 RX ADMIN — Medication 500 MICROGRAM(S): at 05:57

## 2025-06-07 RX ADMIN — DAPTOMYCIN 124 MILLIGRAM(S): 500 INJECTION, POWDER, LYOPHILIZED, FOR SOLUTION INTRAVENOUS at 22:46

## 2025-06-07 RX ADMIN — Medication 1 TABLET(S): at 08:42

## 2025-06-07 RX ADMIN — FLUTICASONE PROPIONATE 1 SPRAY(S): 50 SPRAY, METERED NASAL at 17:06

## 2025-06-07 RX ADMIN — TRIAMCINOLONE ACETONIDE 1 APPLICATION(S): 1 CREAM TOPICAL at 05:57

## 2025-06-07 RX ADMIN — Medication 25 MILLIGRAM(S): at 12:29

## 2025-06-07 RX ADMIN — TRIAMCINOLONE ACETONIDE 1 APPLICATION(S): 1 CREAM TOPICAL at 17:06

## 2025-06-07 RX ADMIN — Medication 50 MILLIGRAM(S): at 22:47

## 2025-06-07 RX ADMIN — Medication 500 MICROGRAM(S): at 11:06

## 2025-06-07 RX ADMIN — FLUTICASONE PROPIONATE 1 SPRAY(S): 50 SPRAY, METERED NASAL at 05:57

## 2025-06-07 RX ADMIN — Medication 0.5 MILLIGRAM(S): at 08:36

## 2025-06-07 RX ADMIN — Medication 0.5 MILLIGRAM(S): at 17:03

## 2025-06-07 RX ADMIN — Medication 0.5 MILLIGRAM(S): at 11:05

## 2025-06-07 RX ADMIN — Medication 500 MICROGRAM(S): at 23:47

## 2025-06-07 NOTE — PROGRESS NOTE ADULT - SUBJECTIVE AND OBJECTIVE BOX
Patient is a 44y old  Female who presents with a chief complaint of Abscess (07 Jun 2025 12:15)        SUBJECTIVE / OVERNIGHT EVENTS: reports left chest wall pain better. still itchy. breathing ok.       MEDICATIONS  (STANDING):  cetirizine 5 milliGRAM(s) Oral daily  chlorhexidine 2% Cloths 1 Application(s) Topical daily  DAPTOmycin IVPB 600 milliGRAM(s) IV Intermittent <User Schedule>  ertapenem  IVPB 1000 milliGRAM(s) IV Intermittent every 24 hours  fluticasone propionate 50 MICROgram(s)/spray Nasal Spray 1 Spray(s) Both Nostrils two times a day  ipratropium    for Nebulization 500 MICROGram(s) Nebulizer every 6 hours  lactobacillus acidophilus 1 Tablet(s) Oral two times a day with meals  lidocaine   4% Patch 1 Patch Transdermal daily  mupirocin 2% Nasal 1 Application(s) Both Nostrils two times a day  oxyCODONE    IR 5 milliGRAM(s) Oral once  pantoprazole    Tablet 40 milliGRAM(s) Oral before breakfast  potassium chloride    Tablet ER 20 milliEquivalent(s) Oral once  Treprostinil (Remodulin) subcutaneous continuous infusion 51 NANOgrams/kg/min 1 Pump(s) 1 Each SubCutaneous Continuous Pump  triamcinolone 0.1% Cream 1 Application(s) Topical two times a day    MEDICATIONS  (PRN):  acetaminophen     Tablet .. 650 milliGRAM(s) Oral every 6 hours PRN Mild Pain (1 - 3), Moderate Pain (4 - 6)  diphenhydrAMINE 50 milliGRAM(s) Oral every 4 hours PRN Rash and/or Itching  HYDROmorphone  Injectable 0.5 milliGRAM(s) IV Push every 4 hours PRN Severe Pain (7 - 10)  oxyCODONE    IR 5 milliGRAM(s) Oral every 4 hours PRN Moderate Pain (4 - 6)      Vital Signs Last 24 Hrs  T(C): 36.7 (07 Jun 2025 11:30), Max: 36.7 (06 Jun 2025 20:43)  T(F): 98.1 (07 Jun 2025 11:30), Max: 98.1 (07 Jun 2025 11:30)  HR: 99 (07 Jun 2025 11:30) (89 - 99)  BP: 103/64 (07 Jun 2025 11:30) (103/64 - 120/76)  BP(mean): --  RR: 18 (07 Jun 2025 11:30) (18 - 18)  SpO2: 98% (07 Jun 2025 11:30) (97% - 99%)    Parameters below as of 07 Jun 2025 11:30  Patient On (Oxygen Delivery Method): nasal cannula  O2 Flow (L/min): 2    CAPILLARY BLOOD GLUCOSE        I&O's Summary    06 Jun 2025 07:01  -  07 Jun 2025 07:00  --------------------------------------------------------  IN: 720 mL / OUT: 0 mL / NET: 720 mL    07 Jun 2025 07:01  -  07 Jun 2025 19:44  --------------------------------------------------------  IN: 480 mL / OUT: 0 mL / NET: 480 mL          PHYSICAL EXAM:   GENERAL: NAD, well-developed  HEAD:  Atraumatic, Normocephalic  EYES: conjunctiva and sclera clear  NECK: Supple,   CHEST/LUNG: Clear to auscultation bilaterally; No wheeze  HEART: S1S2 normal. Regular rate and rhythm; No murmurs, rubs, or gallops  ABDOMEN: Soft, Nontender, Nondistended; Bowel sounds present  EXTREMITIES:    No clubbing, cyanosis, or edema  PSYCH/Neuro: AAOx3. Non-focal.   SKIN: left chest wall near ppm site mild swelling.       LABS:                        11.3   10.53 )-----------( 323      ( 07 Jun 2025 06:14 )             35.9     06-07    137  |  100  |  7   ----------------------------<  82  3.6   |  23  |  0.80    Ca    8.3[L]      07 Jun 2025 06:13            Urinalysis Basic - ( 07 Jun 2025 06:13 )    Color: x / Appearance: x / SG: x / pH: x  Gluc: 82 mg/dL / Ketone: x  / Bili: x / Urobili: x   Blood: x / Protein: x / Nitrite: x   Leuk Esterase: x / RBC: x / WBC x   Sq Epi: x / Non Sq Epi: x / Bacteria: x          RADIOLOGY & ADDITIONAL TESTS:    Imaging Personally Reviewed:   Consultant(s) Notes Reviewed:    Care Discussed with Consultants/Other Providers:

## 2025-06-07 NOTE — PROGRESS NOTE ADULT - PROBLEM SELECTOR PLAN 1
Has a history of abscesses notably abdominal (grown MRSA and pseudomonas in the past). Current location LUE.   -s/p I&D, f/u culture - MRSA and ESBL Klebsiella growing.   >    > 6/1 : blood cx + GPC, MRSA pcr pos > s/p Ceftriaxone per ID recs. -repeat blood cx x2 from 6/2 - NGTD. -Check TTE - no vegetations seen. Check LUE CT with IV contrast -f/u ID recs if further imaging or mgmt needed.   -Abx now dapto and ertapenem. -Ertapenem 7 day course per ID - started 6/2. -Dapto likely prolonged course.    - on Dapto, s/p Azithro per ID  - F/U ID team for further recs  -MRSA bacteremia. F/u repeat blood cultures - 6/2 cultures NGTD. -s/p PPM removal 6/3 by EP. -MYRIAM intraop per EP - did not mention anything though not much detail in EP procedure report.   -Pain control prn. Ice packs. Lidocaine topical. -transition to oral pain control soon.  -Tele.  -Fluconazole 150mg po x 1 for vaginal yeast infection. Probiotics added.

## 2025-06-08 LAB
ANION GAP SERPL CALC-SCNC: 13 MMOL/L — SIGNIFICANT CHANGE UP (ref 5–17)
BUN SERPL-MCNC: 6 MG/DL — LOW (ref 7–23)
CALCIUM SERPL-MCNC: 8.4 MG/DL — SIGNIFICANT CHANGE UP (ref 8.4–10.5)
CHLORIDE SERPL-SCNC: 99 MMOL/L — SIGNIFICANT CHANGE UP (ref 96–108)
CK SERPL-CCNC: 56 U/L — SIGNIFICANT CHANGE UP (ref 25–170)
CO2 SERPL-SCNC: 22 MMOL/L — SIGNIFICANT CHANGE UP (ref 22–31)
CREAT SERPL-MCNC: 0.84 MG/DL — SIGNIFICANT CHANGE UP (ref 0.5–1.3)
EGFR: 88 ML/MIN/1.73M2 — SIGNIFICANT CHANGE UP
EGFR: 88 ML/MIN/1.73M2 — SIGNIFICANT CHANGE UP
GLUCOSE SERPL-MCNC: 68 MG/DL — LOW (ref 70–99)
HCT VFR BLD CALC: 37.5 % — SIGNIFICANT CHANGE UP (ref 34.5–45)
HGB BLD-MCNC: 11.6 G/DL — SIGNIFICANT CHANGE UP (ref 11.5–15.5)
MCHC RBC-ENTMCNC: 22.7 PG — LOW (ref 27–34)
MCHC RBC-ENTMCNC: 30.9 G/DL — LOW (ref 32–36)
MCV RBC AUTO: 73.5 FL — LOW (ref 80–100)
NRBC BLD AUTO-RTO: 0 /100 WBCS — SIGNIFICANT CHANGE UP (ref 0–0)
PLATELET # BLD AUTO: 361 K/UL — SIGNIFICANT CHANGE UP (ref 150–400)
POTASSIUM SERPL-MCNC: 4.4 MMOL/L — SIGNIFICANT CHANGE UP (ref 3.5–5.3)
POTASSIUM SERPL-SCNC: 4.4 MMOL/L — SIGNIFICANT CHANGE UP (ref 3.5–5.3)
RBC # BLD: 5.1 M/UL — SIGNIFICANT CHANGE UP (ref 3.8–5.2)
RBC # FLD: 17.1 % — HIGH (ref 10.3–14.5)
SODIUM SERPL-SCNC: 134 MMOL/L — LOW (ref 135–145)
WBC # BLD: 10.34 K/UL — SIGNIFICANT CHANGE UP (ref 3.8–10.5)
WBC # FLD AUTO: 10.34 K/UL — SIGNIFICANT CHANGE UP (ref 3.8–10.5)

## 2025-06-08 PROCEDURE — 99232 SBSQ HOSP IP/OBS MODERATE 35: CPT

## 2025-06-08 RX ORDER — DAPTOMYCIN 500 MG/10ML
750 INJECTION, POWDER, LYOPHILIZED, FOR SOLUTION INTRAVENOUS EVERY 24 HOURS
Refills: 0 | Status: DISCONTINUED | OUTPATIENT
Start: 2025-06-08 | End: 2025-06-08

## 2025-06-08 RX ORDER — DAPTOMYCIN 500 MG/10ML
750 INJECTION, POWDER, LYOPHILIZED, FOR SOLUTION INTRAVENOUS EVERY 24 HOURS
Refills: 0 | Status: DISCONTINUED | OUTPATIENT
Start: 2025-06-09 | End: 2025-06-10

## 2025-06-08 RX ORDER — LINEZOLID 2 MG/ML
600 INJECTION, SOLUTION INTRAVENOUS EVERY 12 HOURS
Refills: 0 | Status: COMPLETED | OUTPATIENT
Start: 2025-06-08 | End: 2025-06-09

## 2025-06-08 RX ADMIN — Medication 650 MILLIGRAM(S): at 18:01

## 2025-06-08 RX ADMIN — Medication 5 MILLIGRAM(S): at 09:06

## 2025-06-08 RX ADMIN — Medication 0.5 MILLIGRAM(S): at 14:33

## 2025-06-08 RX ADMIN — Medication 500 MICROGRAM(S): at 23:32

## 2025-06-08 RX ADMIN — Medication 0.5 MILLIGRAM(S): at 05:10

## 2025-06-08 RX ADMIN — Medication 50 MILLIGRAM(S): at 17:52

## 2025-06-08 RX ADMIN — Medication 0.5 MILLIGRAM(S): at 00:28

## 2025-06-08 RX ADMIN — Medication 50 MILLIGRAM(S): at 05:51

## 2025-06-08 RX ADMIN — ERTAPENEM SODIUM 100 MILLIGRAM(S): 1 INJECTION, POWDER, LYOPHILIZED, FOR SOLUTION INTRAMUSCULAR; INTRAVENOUS at 23:32

## 2025-06-08 RX ADMIN — FLUTICASONE PROPIONATE 1 SPRAY(S): 50 SPRAY, METERED NASAL at 05:11

## 2025-06-08 RX ADMIN — Medication 0.5 MILLIGRAM(S): at 23:32

## 2025-06-08 RX ADMIN — Medication 650 MILLIGRAM(S): at 18:30

## 2025-06-08 RX ADMIN — Medication 0.5 MILLIGRAM(S): at 06:00

## 2025-06-08 RX ADMIN — LINEZOLID 600 MILLIGRAM(S): 2 INJECTION, SOLUTION INTRAVENOUS at 21:51

## 2025-06-08 RX ADMIN — Medication 0.5 MILLIGRAM(S): at 01:00

## 2025-06-08 RX ADMIN — Medication 500 MICROGRAM(S): at 13:18

## 2025-06-08 RX ADMIN — Medication 0.5 MILLIGRAM(S): at 11:22

## 2025-06-08 RX ADMIN — Medication 1 TABLET(S): at 09:05

## 2025-06-08 RX ADMIN — Medication 0.5 MILLIGRAM(S): at 15:30

## 2025-06-08 RX ADMIN — Medication 40 MILLIGRAM(S): at 09:04

## 2025-06-08 RX ADMIN — Medication 0.5 MILLIGRAM(S): at 10:22

## 2025-06-08 RX ADMIN — Medication 1 APPLICATION(S): at 09:10

## 2025-06-08 RX ADMIN — Medication 500 MICROGRAM(S): at 17:53

## 2025-06-08 RX ADMIN — Medication 1 TABLET(S): at 17:53

## 2025-06-08 NOTE — CONSULT NOTE ADULT - PROVIDER SPECIALTY LIST ADULT
Electrophysiology
Intervent Radiology
Pulmonology
Transplant ID
Transplant Pulmonology
Allergy/Immunology

## 2025-06-08 NOTE — CONSULT NOTE ADULT - ASSESSMENT
Interventional Radiology    Evaluate for Procedure: PICC    HPI: 45 y/o female, PMH pHTN (on treprostinil SQ pump) currently on lung transplant list, HF (s/p PPM dual chamber 2016), chronic PE (dx 2017) no longer on DOAC, SVT (s/p ablation 05/2020), asthma, with recurrent abscesses (abdominal wall, UE, most recently MRSA) p/w upper extremity abscess  now requiring PICC for antibiotic administration.    Allergies: vancomycin (Rash)  adhesives (Rash)    Medications (Abx/Cardiac/Anticoagulation/Blood Products)    DAPTOmycin IVPB: 124 mL/Hr IV Intermittent (06-07 @ 22:46)  ertapenem  IVPB: 100 mL/Hr IV Intermittent (06-07 @ 17:05)    Data:    T(C): 36.4  HR: 103  BP: 102/67  RR: 18  SpO2: 96%    -WBC 10.34 / HgB 11.6 / Hct 37.5 / Plt 361  -Na 134 / Cl 99 / BUN 6 / Glucose 68  -K 4.4 / CO2 22 / Cr 0.84  -INR 1.05       Radiology: Reviewed    Assessment/Plan:   45 y/o female, PMH pHTN (on treprostinil SQ pump) currently on lung transplant list, HF (s/p PPM dual chamber 2016), chronic PE (dx 2017) no longer on DOAC, SVT (s/p ablation 05/2020), asthma, with recurrent abscesses (abdominal wall, UE, most recently MRSA) p/w upper extremity abscess  now requiring PICC for antibiotic administration. PICC team unsuccessful.    -- IR will plan to perform PICC placement Monday 6/9.  -- hold a.m. anticoagulation and obtain AM labs on 6/9.  -- please place IR procedure request order under Dr. Alvarez.    --  Adolfo Alvarez, PGY-5  Vascular and Interventional Radiology   Available on [employer] Teams    - Non-emergent consults: Place IR consult order in [facility]  - Emergent issues (pager): [facility] [phone]; [facility] [phone]; [phone]  - Scheduling questions: [facility] [phone]; [facility] [phone]  - Clinic/outpatient booking: [facility] [phone]; [facility] [phone]

## 2025-06-08 NOTE — CHART NOTE - NSCHARTNOTEFT_GEN_A_CORE
Lung transplant note    Continues to recover well from soft tissue infection/bacteremia with antibiotics.  Paper tape adhesives have been less of a problem.  However still reports itching with EKG leads  Continues to work with pulmonary hypertension team for PH management    Mckay Fritz MD, MPH   Lung Transplantation  , Pulmonary and Critical care Medicine  [facility]

## 2025-06-08 NOTE — PROGRESS NOTE ADULT - SUBJECTIVE AND OBJECTIVE BOX
Patient is a 44y old  Female who presents with a chief complaint of Abscess (08 Jun 2025 14:52)        SUBJECTIVE / OVERNIGHT EVENTS: patient reports still some pain at left chest wall site.       MEDICATIONS  (STANDING):  cetirizine 5 milliGRAM(s) Oral daily  chlorhexidine 2% Cloths 1 Application(s) Topical daily  DAPTOmycin IVPB 750 milliGRAM(s) IV Intermittent every 24 hours  ertapenem  IVPB 1000 milliGRAM(s) IV Intermittent every 24 hours  fluticasone propionate 50 MICROgram(s)/spray Nasal Spray 1 Spray(s) Both Nostrils two times a day  ipratropium    for Nebulization 500 MICROGram(s) Nebulizer every 6 hours  lactobacillus acidophilus 1 Tablet(s) Oral two times a day with meals  lidocaine   4% Patch 1 Patch Transdermal daily  pantoprazole    Tablet 40 milliGRAM(s) Oral before breakfast  potassium chloride    Tablet ER 20 milliEquivalent(s) Oral once  Treprostinil (Remodulin) subcutaneous continuous infusion 51 NANOgrams/kg/min 1 Pump(s) 1 Each SubCutaneous Continuous Pump  triamcinolone 0.1% Cream 1 Application(s) Topical two times a day    MEDICATIONS  (PRN):  acetaminophen     Tablet .. 650 milliGRAM(s) Oral every 6 hours PRN Mild Pain (1 - 3), Moderate Pain (4 - 6)  diphenhydrAMINE 50 milliGRAM(s) Oral every 4 hours PRN Rash and/or Itching  HYDROmorphone  Injectable 0.5 milliGRAM(s) IV Push every 4 hours PRN Severe Pain (7 - 10)  oxyCODONE    IR 5 milliGRAM(s) Oral every 4 hours PRN Moderate Pain (4 - 6)      Vital Signs Last 24 Hrs  T(C): 36.9 (08 Jun 2025 11:57), Max: 36.9 (08 Jun 2025 11:57)  T(F): 98.4 (08 Jun 2025 11:57), Max: 98.4 (08 Jun 2025 11:57)  HR: 103 (08 Jun 2025 14:30) (88 - 103)  BP: 100/59 (08 Jun 2025 14:30) (96/67 - 100/69)  BP(mean): --  RR: 18 (08 Jun 2025 11:57) (18 - 18)  SpO2: 98% (08 Jun 2025 11:57) (97% - 99%)    Parameters below as of 08 Jun 2025 11:57  Patient On (Oxygen Delivery Method): nasal cannula      CAPILLARY BLOOD GLUCOSE        I&O's Summary    07 Jun 2025 07:01  -  08 Jun 2025 07:00  --------------------------------------------------------  IN: 480 mL / OUT: 0 mL / NET: 480 mL          PHYSICAL EXAM:   GENERAL: NAD, well-developed  HEAD:  Atraumatic, Normocephalic  EYES: EOMI, PERRLA, conjunctiva and sclera clear  NECK: Supple,    CHEST/LUNG: Clear to auscultation bilaterally; No wheeze  HEART: S1S2 normal. Regular rate and rhythm; No murmurs, rubs, or gallops  ABDOMEN: Soft, Nontender, Nondistended; Bowel sounds present  EXTREMITIES:   No clubbing, cyanosis, or edema  PSYCH/Neuro: AAOx3. Non-focal.   SKIN: mild swelling and tenderness near left upper chest wall ppm extraction site.       LABS:                        11.6   10.34 )-----------( 361      ( 08 Jun 2025 11:55 )             37.5     06-08    134[L]  |  99  |  6[L]  ----------------------------<  68[L]  4.4   |  22  |  0.84    Ca    8.4      08 Jun 2025 11:55            Urinalysis Basic - ( 08 Jun 2025 11:55 )    Color: x / Appearance: x / SG: x / pH: x  Gluc: 68 mg/dL / Ketone: x  / Bili: x / Urobili: x   Blood: x / Protein: x / Nitrite: x   Leuk Esterase: x / RBC: x / WBC x   Sq Epi: x / Non Sq Epi: x / Bacteria: x          RADIOLOGY & ADDITIONAL TESTS:    Imaging Personally Reviewed:  Consultant(s) Notes Reviewed:  txp pulm  Care Discussed with Consultants/Other Providers: ID

## 2025-06-08 NOTE — PROGRESS NOTE ADULT - PROBLEM SELECTOR PLAN 1
Has a history of abscesses notably abdominal (grown MRSA and pseudomonas in the past). Current location LUE.   -s/p I&D, f/u culture - MRSA and ESBL Klebsiella growing.   >    > 6/1 : blood cx + GPC, MRSA pcr pos > s/p Ceftriaxone per ID recs. -repeat blood cx x2 from 6/2 - NGTD. -Check TTE - no vegetations seen. Check LUE CT with IV contrast -f/u ID recs if further imaging or mgmt needed.   -Abx now dapto and ertapenem. -Ertapenem 7 day course per ID - started 6/2. -Dapto likely prolonged course; per ID, needs a PICC.    - on Dapto, s/p Azithro per ID. -Weekly CK and LFT's while on dapto.  - F/U ID team for further recs  -MRSA bacteremia. F/u repeat blood cultures - 6/2 cultures NGTD. -s/p PPM removal 6/3 by EP. -MYRIAM intraop per EP - did not mention anything though not much detail in EP procedure report.   -Pain control prn. Ice packs. Lidocaine topical. -transition to oral pain control soon.  -Tele.  -Fluconazole 150mg po x 1 for vaginal yeast infection. Probiotics added.  -IV line blew and midline/PIV couldn't be placed by PICC team. Needs IR for PICC for long term abx, regimen per ID. Linezolid 600mg po bid for now until line replaced then dapto again.

## 2025-06-08 NOTE — PROGRESS NOTE ADULT - PROBLEM SELECTOR PLAN 2
-off of xarelto, nifedipine, diuretics  -continue remodulin - f/u with pulm/ID if pump needs to be adjusted with recurrent skin infections. -They are looking into this for outpatient - plan to change to winrevair or add it as adjunct soon per Dr Yoon as outpatient.   -Allergy appreciated; Zyrtec daily was added. Can try different adhesives. -Benadryl 50mg po q4h prn for now.   -continue atrovent  -continue NC  -Transplant candidacy reviewed, currently evaluation closed until BMI closer to target in addition to recurrent infections more under control. -Resume Ozempic once able. -Outpatient weight loss clinic f/u.   - F/U transplant pulm team for further recs  -Lasix 20mg IV x 1 6/5 given for LE edema per pulm.

## 2025-06-09 PROCEDURE — 99232 SBSQ HOSP IP/OBS MODERATE 35: CPT

## 2025-06-09 PROCEDURE — 99232 SBSQ HOSP IP/OBS MODERATE 35: CPT | Mod: GC

## 2025-06-09 PROCEDURE — 36573 INSJ PICC RS&I 5 YR+: CPT

## 2025-06-09 PROCEDURE — 71045 X-RAY EXAM CHEST 1 VIEW: CPT | Mod: 26

## 2025-06-09 RX ORDER — DIPHENHYDRAMINE HCL 12.5MG/5ML
25 ELIXIR ORAL ONCE
Refills: 0 | Status: COMPLETED | OUTPATIENT
Start: 2025-06-09 | End: 2025-06-09

## 2025-06-09 RX ADMIN — Medication 0.5 MILLIGRAM(S): at 06:58

## 2025-06-09 RX ADMIN — ERTAPENEM SODIUM 100 MILLIGRAM(S): 1 INJECTION, POWDER, LYOPHILIZED, FOR SOLUTION INTRAMUSCULAR; INTRAVENOUS at 17:03

## 2025-06-09 RX ADMIN — Medication 1 APPLICATION(S): at 14:34

## 2025-06-09 RX ADMIN — Medication 500 MICROGRAM(S): at 17:09

## 2025-06-09 RX ADMIN — Medication 40 MILLIGRAM(S): at 06:15

## 2025-06-09 RX ADMIN — Medication 0.5 MILLIGRAM(S): at 18:36

## 2025-06-09 RX ADMIN — LINEZOLID 600 MILLIGRAM(S): 2 INJECTION, SOLUTION INTRAVENOUS at 06:16

## 2025-06-09 RX ADMIN — Medication 500 MICROGRAM(S): at 22:39

## 2025-06-09 RX ADMIN — Medication 0.5 MILLIGRAM(S): at 15:30

## 2025-06-09 RX ADMIN — Medication 1 APPLICATION(S): at 14:02

## 2025-06-09 RX ADMIN — Medication 25 MILLIGRAM(S): at 13:11

## 2025-06-09 RX ADMIN — Medication 0.5 MILLIGRAM(S): at 22:39

## 2025-06-09 RX ADMIN — Medication 0.5 MILLIGRAM(S): at 10:28

## 2025-06-09 RX ADMIN — Medication 0.5 MILLIGRAM(S): at 06:14

## 2025-06-09 RX ADMIN — DAPTOMYCIN 130 MILLIGRAM(S): 500 INJECTION, POWDER, LYOPHILIZED, FOR SOLUTION INTRAVENOUS at 21:39

## 2025-06-09 RX ADMIN — Medication 0.5 MILLIGRAM(S): at 23:08

## 2025-06-09 RX ADMIN — Medication 5 MILLIGRAM(S): at 13:12

## 2025-06-09 RX ADMIN — Medication 0.5 MILLIGRAM(S): at 14:33

## 2025-06-09 RX ADMIN — Medication 500 MICROGRAM(S): at 13:12

## 2025-06-09 RX ADMIN — Medication 0.5 MILLIGRAM(S): at 11:30

## 2025-06-09 RX ADMIN — Medication 1 TABLET(S): at 17:09

## 2025-06-09 RX ADMIN — Medication 0.5 MILLIGRAM(S): at 19:18

## 2025-06-09 RX ADMIN — FLUTICASONE PROPIONATE 1 SPRAY(S): 50 SPRAY, METERED NASAL at 17:09

## 2025-06-09 RX ADMIN — Medication 1 TABLET(S): at 09:07

## 2025-06-09 RX ADMIN — Medication 0.5 MILLIGRAM(S): at 00:07

## 2025-06-09 RX ADMIN — Medication 20 MILLIEQUIVALENT(S): at 17:03

## 2025-06-09 NOTE — PROGRESS NOTE ADULT - SUBJECTIVE AND OBJECTIVE BOX
Lung Transplant Progress Note  =====================================================  24 hour events: Pt seen and examined bedside, breathing comfortably on NC    T(C): 36.4 (06-09-25 @ 11:08), Max: 36.5 (06-09-25 @ 05:28)  HR: 97 (06-09-25 @ 11:08) (89 - 103)  BP: 101/65 (06-09-25 @ 11:08) (100/59 - 102/67)  RR: 18 (06-09-25 @ 11:08) (18 - 18)  SpO2: 99% (06-09-25 @ 11:08) (96% - 99%)    PAST MEDICAL & SURGICAL HISTORY:  Anemia      Pulmonary hypertension      Asthma  On home O2 nightly at 2L via NC      PE (pulmonary thromboembolism)  on Xarelto 10 mg daily      Sinusitis      Right heart failure      H/O pulmonary hypertension      Pulmonary embolism      History of tachycardia      On supplemental oxygen by nasal cannula  O2 @ 2L      Pacemaker      Right atrial thrombus      Hypotension      Pacemaker      History of pacemaker  12/19 - [employer] model Ingevity 4469        Home Meds: Home Medications:  Atrovent 18 mcg/inh inhalation aerosol: 1 inhaled 4 times a day as needed for  shortness of breath and/or wheezing (04 Jun 2025 14:05)  Fasenra Prefilled Syringe 30 mg/mL subcutaneous solution: 30 milligram(s) subcutaneously every 4 weeks . Next dose planned for Friday 6/6/25 (04 Jun 2025 14:05)  Flonase 50 mcg/inh nasal spray: 1 spray(s) in each nostril once a day (04 Jun 2025 14:05)  montelukast 10 mg oral tablet: 1 tab(s) orally once a day (04 Jun 2025 14:05)  Remodulin 5 mg/mL injectable solution: 1 application injectable once a day via her own SQ PUMP (04 Jun 2025 14:05)  semaglutide 1 mg/0.5 mL (1 mg dose) subcutaneous solution: 1 milligram(s) subcutaneously once a week on Fridays (04 Jun 2025 14:05)    Allergies: Allergies    vancomycin (Rash)  adhesives (Rash)    Intolerances        REVIEW OF SYSTEMS    [x] A ten-point review of systems was otherwise negative except as noted.  [ ] Due to altered mental status/intubation, subjective information were not able to be obtained from the patient. History was obtained, to the extent possible, from review of the chart and collateral sources of information.      CURRENT MEDICATIONS:   Neurologic Medications  acetaminophen     Tablet .. 650 milliGRAM(s) Oral every 6 hours PRN Mild Pain (1 - 3), Moderate Pain (4 - 6)  diphenhydrAMINE 50 milliGRAM(s) Oral every 4 hours PRN Rash and/or Itching  HYDROmorphone  Injectable 0.5 milliGRAM(s) IV Push every 4 hours PRN Severe Pain (7 - 10)  oxyCODONE    IR 5 milliGRAM(s) Oral every 4 hours PRN Moderate Pain (4 - 6)    Respiratory Medications  cetirizine 5 milliGRAM(s) Oral daily  ipratropium    for Nebulization 500 MICROGram(s) Nebulizer every 6 hours    Cardiovascular Medications    Gastrointestinal Medications  pantoprazole    Tablet 40 milliGRAM(s) Oral before breakfast  potassium chloride    Tablet ER 20 milliEquivalent(s) Oral once    Genitourinary Medications    Hematologic/Oncologic Medications    Antimicrobial/Immunologic Medications  DAPTOmycin IVPB 750 milliGRAM(s) IV Intermittent every 24 hours  ertapenem  IVPB 1000 milliGRAM(s) IV Intermittent every 24 hours    Endocrine/Metabolic Medications    Topical/Other Medications  chlorhexidine 2% Cloths 1 Application(s) Topical daily  fluticasone propionate 50 MICROgram(s)/spray Nasal Spray 1 Spray(s) Both Nostrils two times a day  lactobacillus acidophilus 1 Tablet(s) Oral two times a day with meals  lidocaine   4% Patch 1 Patch Transdermal daily  Treprostinil (Remodulin) subcutaneous continuous infusion 51 NANOgrams/kg/min 1 Pump(s) 1 Each SubCutaneous Continuous Pump  triamcinolone 0.1% Cream 1 Application(s) Topical two times a day      INS/OUTS (last 24 hours):  I&O's Summary    --------------------------------------------------------------------------------------    PHYSICAL EXAM:  GENERAL: NAD, resting comfortably in bed  HEAD:  Atraumatic, normocephalic  EYES: EOMI, conjunctiva and sclera clear  CHEST/LUNG: CTA B/L, good inspiratory effort  HEART: RRR. +S1/S2  ABDOMEN: Soft, nondistended, nontender to palpation  EXTREMITIES: No clubbing, cyanosis, or edema  PSYCH: A&O  NEUROLOGY: Non-focal, motor & sensory grossly intact  SKIN: Warm & dry, no rashes or lesions    LABS:                        11.6   10.34 )-----------( 361      ( 08 Jun 2025 11:55 )             37.5     06-08    134[L]  |  99  |  6[L]  ----------------------------<  68[L]  4.4   |  22  |  0.84    Ca    8.4      08 Jun 2025 11:55        Urinalysis Basic - ( 08 Jun 2025 11:55 )    Color: x / Appearance: x / SG: x / pH: x  Gluc: 68 mg/dL / Ketone: x  / Bili: x / Urobili: x   Blood: x / Protein: x / Nitrite: x   Leuk Esterase: x / RBC: x / WBC x   Sq Epi: x / Non Sq Epi: x / Bacteria: x        CAPILLARY BLOOD GLUCOSE          RADIOLOGY:

## 2025-06-09 NOTE — PROGRESS NOTE ADULT - ASSESSMENT
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 Admitted for recurrent abdominal wall abscess s/p I&D. Cultures grew MRSA and pseudomonas. Patient completed antibiotic therapy. She underwent additional work up for transplant including MYRIAM, cMRI, MRCP, and CT colonography, here now with LUE abscess.    #pre lung transplant evaluation  Possible barriers to transplant:  - weight loss with target BMI < 32. Ideally < 30 as this is a major barrier to transplant  - nutritional/metabolic issues  - active/recurrent infections  - have pt contact weight loss clinic for ozempic (last dose 5/30/25) coverage; weekly dose  - minimize calories for consumption for goal of weight loss    Pulm  -discuss with Pulm team if any alternative to therapy not via pump to avoid adhesive   -given skin infections/irritation pt received Allergy evaluation 6/3/25 for specific adhesive alternatives/ different options for surgical dressing for future  - pHTN therapy per pulm team      Plan for Ms Conway to follow up with our team on a monthly basis moving forward with the focus on optimizing the above concerns. 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 Admitted for recurrent abdominal wall abscess s/p I&D. Cultures grew MRSA and pseudomonas. Patient completed antibiotic therapy. She underwent additional work up for transplant including MYRIAM, cMRI, MRCP, and CT colonography, here now with LUE abscess.    #pre lung transplant evaluation  Barriers to transplant:  - weight loss with target BMI < 32. Ideally < 30  - nutritional/metabolic issues  - active/recurrent infections  - pt will contact weight loss clinic for ozempic (last dose 5/30/25) coverage; weekly dose    Pulm  -discuss with Pulm team if any alternative to therapy not via pump to avoid adhesive   -given skin infections/irritation pt received Allergy evaluation 6/3/25 for specific adhesive alternatives/ different options for surgical dressing for future  - pHTN therapy per pulm team      Plan for Ms Conway to follow up with our team on a monthly basis moving forward with the focus on optimizing the above concerns.

## 2025-06-09 NOTE — PROGRESS NOTE ADULT - ASSESSMENT
44F w/ PMH of pHTN on Remodulin sq, UE DVT on Xarelto, Asthma, CHF  She is also following with transplant pulmonary though currently not a candidate.  Comes to the ED with concern with concern for UE abscess, S/p I&D    # pHTN  Patient has a known history of PAH on remodulin. Also one O2 at home, 2-3lpm. Tentative plan to transition from remodulin to winrevair or add it as adjunct soon per Dr Yoon, but not during current state of infection. Appears stable from baseline. Previously also on Xarelto, Bumex, Aldactone  - continue home Remodulin 51ng/kg/min on home pump  - monitor volume status, diurese as needed to keep net even  - monitor K, Na, Mg  - appreciate allergy recs    - cont w/ pain control, please add lidocaine patch on anterior chest to permit proper inspiration to prevent atelectasis  - incentive spirometer  - TTE w/o signs of new valvular insufficiency, known PI    # asthma  Patient with known asthma, On Fasenra. PFTs from 1/2025 w/ severe obstruction, low DLCO.  - continue home equivalent ipratropium, levalbuterol (off budesonide due to facial rash)  - continue flonase  - PRN levalbuterol for wheezing, q6h

## 2025-06-09 NOTE — PROGRESS NOTE ADULT - ATTENDING COMMENTS
44F w/ PMH of pHTN on Remodulin sq, UE DVT on Xarelto, Asthma, CHF  She is also following with transplant pulmonary though currently not a candidate.  Comes to the ED with concern with concern for UE abscess.  Now being admitted for I&D and likely IV abx.  Site infection, of note NEW ESBL result communicated with [facility] team about altering abx.      # pHTN  Patient has a known history of PAH on remodulin. Also one O2 at home, 2-3lpm. Tentative plan to transition from remodulin to winrevair or add it as adjunct soon per Dr Yoon  - continue home Remodulin 51ng/kg/min on home pump for now  - given frequent infection despite cleaning consider discharge home with cholrohexadine/alcohol wipes potentially better site sterility until able to switch meds.    - apreciate allergy recs.   - echo w/o veigitation apreciated.  - please keep daily weight, was feeling "bloated and puffy".  baseline weight is 94-96kg s/p diureses back to her baseline keep daily wieghts while here.
44F w/ PMH of pHTN on Remodulin sq, UE DVT on Xarelto, Asthma, CHF  She is also following with transplant pulmonary though currently not a candidate.  Comes to the ED with concern with concern for UE abscess.  Now being admitted for I&D and likely IV abx.  Site infection, of note NEW ESBL result communicated with [facility] team about altering abx.      # pHTN  Patient has a known history of PAH on remodulin. Also one O2 at home, 2-3lpm. Tentative plan to transition from remodulin to winrevair or add it as adjunct soon per Dr Yoon  - continue home Remodulin 51ng/kg/min on home pump for now  - given frequent infection despite cleaning consider discharge home with cholrohexadine/alcohol wipes potentially better site sterility.   - consider repeat ECHO to r/o vegitations    # asthma  Patient with known asthma, On Fasenra. PFTs from 1/2025 w/ severe obstruction, low DLCO.  - continue home equivalent ipratropium, levalbuterol (off budesonide due to facial rash)  - continue flonase  - PRN levalbuterol for wheezing, q6h
44 F pulm htn on Remodulin sq, on xarelto for DVT, here with abscess with speiss due to mrsa bacteremia    c/w remodulin as above  outpatient plan to change meds  f/u after PICC, discharge planning
44F w/ PMH of pHTN on Remodulin sq, UE DVT on Xarelto, Asthma, CHF  She is also following with transplant pulmonary though currently not a candidate.  Comes to the ED with concern with concern for UE abscess.  Now being admitted for I&D and likely IV abx.  Site infection, of note NEW ESBL result communicated with [facility] team about altering abx.      # pHTN  Patient has a known history of PAH on remodulin. Also one O2 at home, 2-3lpm. Tentative plan to transition from remodulin to winrevair or add it as adjunct soon per Dr Yoon  - continue home Remodulin 51ng/kg/min on home pump for now  - given frequent infection despite cleaning consider discharge home with cholrohexadine/alcohol wipes potentially better site sterility until able to switch meds.    - apreciate allergy recs.   - consider repeat ECHO to r/o vegitations  - please keep daily weight, patient feels she is more swollen and has gained ~2kg since admission but per chart roughly at her baseline, if weight goes up would consider diureses. baseline weight is 94-96kg.

## 2025-06-09 NOTE — PRE PROCEDURE NOTE - PRE PROCEDURE EVALUATION
Interventional Radiology    HPI: 43 y/o female, PMH pHTN (on treprostinil SQ pump) currently on lung transplant list, HF (s/p PPM dual chamber 2016), chronic PE (dx 2017) no longer on DOAC, SVT (s/p ablation 05/2020), asthma, with recurrent abscesses (abdominal wall, UE, most recently MRSA) p/w upper extremity abscess  now requiring PICC for antibiotic administration.    Allergies: vancomycin (Rash)  adhesives (Rash)    Medications (Abx/Cardiac/Anticoagulation/Blood Products)    DAPTOmycin IVPB: 124 mL/Hr IV Intermittent (06-07 @ 22:46)  ertapenem  IVPB: 100 mL/Hr IV Intermittent (06-08 @ 23:32)  linezolid    Tablet: 600 milliGRAM(s) Oral (06-09 @ 06:16)    Data:    T(C): 36.5  HR: 89  BP: 102/66  RR: 18  SpO2: 97%    Exam  General: No acute distress  Chest: Non labored breathing      -WBC 10.34 / HgB 11.6 / Hct 37.5 / Plt 361  -Na 134 / Cl 99 / BUN 6 / Glucose 68  -K 4.4 / CO2 22 / Cr 0.84  -ALT -- / Alk Phos -- / T.Bili --    Imaging:     Plan: 44y Female presents for PICC line placement   -Risks/Benefits/alternatives explained with the patient and/or healthcare proxy and witnessed informed consent obtained.    Interventional Radiology    HPI: 43 y/o female, PMH pHTN (on treprostinil SQ pump) currently on lung transplant list, HF (s/p PPM dual chamber 2016), chronic PE (dx 2017) no longer on DOAC, SVT (s/p ablation 05/2020), asthma, with recurrent abscesses (abdominal wall, UE, most recently MRSA) p/w upper extremity abscess  now requiring PICC for antibiotic administration.    Case d/w pulmonary fellow, ok to place in right arm.     Allergies: vancomycin (Rash)  adhesives (Rash)    Medications (Abx/Cardiac/Anticoagulation/Blood Products)    DAPTOmycin IVPB: 124 mL/Hr IV Intermittent (06-07 @ 22:46)  ertapenem  IVPB: 100 mL/Hr IV Intermittent (06-08 @ 23:32)  linezolid    Tablet: 600 milliGRAM(s) Oral (06-09 @ 06:16)    Data:    T(C): 36.5  HR: 89  BP: 102/66  RR: 18  SpO2: 97%    Exam  General: No acute distress  Chest: Non labored breathing      -WBC 10.34 / HgB 11.6 / Hct 37.5 / Plt 361  -Na 134 / Cl 99 / BUN 6 / Glucose 68  -K 4.4 / CO2 22 / Cr 0.84  -ALT -- / Alk Phos -- / T.Bili --    Imaging:     Plan: 44y Female presents for PICC line placement   -Risks/Benefits/alternatives explained with the patient and/or healthcare proxy and witnessed informed consent obtained.

## 2025-06-09 NOTE — PROGRESS NOTE ADULT - PROBLEM SELECTOR PLAN 1
Has a history of abscesses notably abdominal (grown MRSA and pseudomonas in the past). Current location LUE.   - s/p I&D, culture growing MRSA and ESBL Klebsiella   - blood cx growing MRSA, PPM removed 6/3  -Abx now dapto and ertapenem. -Ertapenem 7 day course per ID - started 6/2. -Dapto likely prolonged course; per ID, needs a PICC.    -Weekly CK and LFT's while on dapto  -Pain control prn. Ice packs. Lidocaine topical. -transition to oral pain control soon.  -Fluconazole 150mg po x 1 for vaginal yeast infection. Probiotics added.  -s/p picc 6/9, f/u for final antibiotic duration w/ID

## 2025-06-09 NOTE — PROCEDURE NOTE - PICC: IMPLANT LOT NUMBER
Patients son Danial Patel called this RN, per Danial Patel patient had diarrhea over the weekend, Danial Patel states they called the oncall number and got RX for Lomotil and patient states he was feeling better today and stools were becoming more formed. Danial Patel was also stating they would like to look into home health. Dr. Viola Mejia updated on patients condition, patient has an appointment with Dr. Viola Mejia today, she will evaluate. Patient to be put in private room for treatment for comfort. Dianna 78 orders deferred to PCP, Danial Patel voices understanding, agreeable to POC.
BARD MCKEON

## 2025-06-09 NOTE — PROGRESS NOTE ADULT - SUBJECTIVE AND OBJECTIVE BOX
CHIEF COMPLAINT:    Interval Events:    NAEON.    REVIEW OF SYSTEMS:  [ x ] All other systems negative  [ ] Unable to assess ROS because ________    OBJECTIVE:  ICU Vital Signs Last 24 Hrs  T(C): 36.5 (09 Jun 2025 05:28), Max: 36.9 (08 Jun 2025 11:57)  T(F): 97.7 (09 Jun 2025 05:28), Max: 98.4 (08 Jun 2025 11:57)  HR: 89 (09 Jun 2025 05:28) (88 - 103)  BP: 102/66 (09 Jun 2025 05:28) (100/59 - 102/67)  BP(mean): --  ABP: --  ABP(mean): --  RR: 18 (09 Jun 2025 05:28) (18 - 18)  SpO2: 97% (09 Jun 2025 05:28) (96% - 98%)    O2 Parameters below as of 09 Jun 2025 05:28  Patient On (Oxygen Delivery Method): nasal cannula  O2 Flow (L/min): 2            CAPILLARY BLOOD GLUCOSE          PHYSICAL EXAM:  General: NAD  HEENT: Normal sclera  Lymph Nodes: No LAD  Respiratory: CTABL  Cardiovascular: Regular rate and rhythm no m/r/g  Abdomen: Nontender nondistended  Extremities: No peripheral edema  Skin: No rashes  Neurological: No appreciable neurologic deficits  Psychiatry: Appropriate mood and affect    HOSPITAL MEDICATIONS:  MEDICATIONS  (STANDING):  cetirizine 5 milliGRAM(s) Oral daily  chlorhexidine 2% Cloths 1 Application(s) Topical daily  DAPTOmycin IVPB 750 milliGRAM(s) IV Intermittent every 24 hours  ertapenem  IVPB 1000 milliGRAM(s) IV Intermittent every 24 hours  fluticasone propionate 50 MICROgram(s)/spray Nasal Spray 1 Spray(s) Both Nostrils two times a day  ipratropium    for Nebulization 500 MICROGram(s) Nebulizer every 6 hours  lactobacillus acidophilus 1 Tablet(s) Oral two times a day with meals  lidocaine   4% Patch 1 Patch Transdermal daily  pantoprazole    Tablet 40 milliGRAM(s) Oral before breakfast  potassium chloride    Tablet ER 20 milliEquivalent(s) Oral once  Treprostinil (Remodulin) subcutaneous continuous infusion 51 NANOgrams/kg/min 1 Pump(s) 1 Each SubCutaneous Continuous Pump  triamcinolone 0.1% Cream 1 Application(s) Topical two times a day    MEDICATIONS  (PRN):  acetaminophen     Tablet .. 650 milliGRAM(s) Oral every 6 hours PRN Mild Pain (1 - 3), Moderate Pain (4 - 6)  diphenhydrAMINE 50 milliGRAM(s) Oral every 4 hours PRN Rash and/or Itching  HYDROmorphone  Injectable 0.5 milliGRAM(s) IV Push every 4 hours PRN Severe Pain (7 - 10)  oxyCODONE    IR 5 milliGRAM(s) Oral every 4 hours PRN Moderate Pain (4 - 6)      LABS:                        11.6   10.34 )-----------( 361      ( 08 Jun 2025 11:55 )             37.5     Hgb Trend: 11.6<--, 11.3<--, 11.4<--, 11.6<--  06-08    134[L]  |  99  |  6[L]  ----------------------------<  68[L]  4.4   |  22  |  0.84    Ca    8.4      08 Jun 2025 11:55      Creatinine Trend: 0.84<--, 0.80<--, 0.81<--, 0.77<--, 0.72<--, 0.74<--    Urinalysis Basic - ( 08 Jun 2025 11:55 )    Color: x / Appearance: x / SG: x / pH: x  Gluc: 68 mg/dL / Ketone: x  / Bili: x / Urobili: x   Blood: x / Protein: x / Nitrite: x   Leuk Esterase: x / RBC: x / WBC x   Sq Epi: x / Non Sq Epi: x / Bacteria: x            MICROBIOLOGY:       RADIOLOGY:  [ x ] Reviewed and interpreted by me    PULMONARY FUNCTION TESTS:    EKG:

## 2025-06-10 ENCOUNTER — NON-APPOINTMENT (OUTPATIENT)
Age: 45
End: 2025-06-10

## 2025-06-10 ENCOUNTER — LABORATORY RESULT (OUTPATIENT)
Age: 45
End: 2025-06-10

## 2025-06-10 LAB
ANION GAP SERPL CALC-SCNC: 11 MMOL/L — SIGNIFICANT CHANGE UP (ref 5–17)
BUN SERPL-MCNC: 5 MG/DL — LOW (ref 7–23)
CALCIUM SERPL-MCNC: 8.2 MG/DL — LOW (ref 8.4–10.5)
CHLORIDE SERPL-SCNC: 101 MMOL/L — SIGNIFICANT CHANGE UP (ref 96–108)
CO2 SERPL-SCNC: 22 MMOL/L — SIGNIFICANT CHANGE UP (ref 22–31)
CREAT SERPL-MCNC: 0.82 MG/DL — SIGNIFICANT CHANGE UP (ref 0.5–1.3)
EGFR: 90 ML/MIN/1.73M2 — SIGNIFICANT CHANGE UP
EGFR: 90 ML/MIN/1.73M2 — SIGNIFICANT CHANGE UP
GLUCOSE SERPL-MCNC: 81 MG/DL — SIGNIFICANT CHANGE UP (ref 70–99)
HCT VFR BLD CALC: 35.2 % — SIGNIFICANT CHANGE UP (ref 34.5–45)
HGB BLD-MCNC: 10.8 G/DL — LOW (ref 11.5–15.5)
MAGNESIUM SERPL-MCNC: 2 MG/DL — SIGNIFICANT CHANGE UP (ref 1.6–2.6)
MCHC RBC-ENTMCNC: 22.6 PG — LOW (ref 27–34)
MCHC RBC-ENTMCNC: 30.7 G/DL — LOW (ref 32–36)
MCV RBC AUTO: 73.6 FL — LOW (ref 80–100)
NRBC BLD AUTO-RTO: 0 /100 WBCS — SIGNIFICANT CHANGE UP (ref 0–0)
PHOSPHATE SERPL-MCNC: 2.5 MG/DL — SIGNIFICANT CHANGE UP (ref 2.5–4.5)
PLATELET # BLD AUTO: 339 K/UL — SIGNIFICANT CHANGE UP (ref 150–400)
POTASSIUM SERPL-MCNC: 4.2 MMOL/L — SIGNIFICANT CHANGE UP (ref 3.5–5.3)
POTASSIUM SERPL-SCNC: 4.2 MMOL/L — SIGNIFICANT CHANGE UP (ref 3.5–5.3)
RBC # BLD: 4.78 M/UL — SIGNIFICANT CHANGE UP (ref 3.8–5.2)
RBC # FLD: 17.2 % — HIGH (ref 10.3–14.5)
SODIUM SERPL-SCNC: 134 MMOL/L — LOW (ref 135–145)
WBC # BLD: 9.94 K/UL — SIGNIFICANT CHANGE UP (ref 3.8–10.5)
WBC # FLD AUTO: 9.94 K/UL — SIGNIFICANT CHANGE UP (ref 3.8–10.5)

## 2025-06-10 PROCEDURE — 99232 SBSQ HOSP IP/OBS MODERATE 35: CPT

## 2025-06-10 PROCEDURE — 99233 SBSQ HOSP IP/OBS HIGH 50: CPT

## 2025-06-10 RX ORDER — SODIUM CHLORIDE 9 G/1000ML
250 INJECTION, SOLUTION INTRAVENOUS ONCE
Refills: 0 | Status: COMPLETED | OUTPATIENT
Start: 2025-06-10 | End: 2025-06-10

## 2025-06-10 RX ORDER — SOD PHOS DI, MONO/K PHOS MONO 250 MG
1 TABLET ORAL EVERY 4 HOURS
Refills: 0 | Status: COMPLETED | OUTPATIENT
Start: 2025-06-10 | End: 2025-06-10

## 2025-06-10 RX ORDER — SODIUM CHLORIDE 9 G/1000ML
500 INJECTION, SOLUTION INTRAVENOUS ONCE
Refills: 0 | Status: COMPLETED | OUTPATIENT
Start: 2025-06-10 | End: 2025-06-10

## 2025-06-10 RX ORDER — HYDROMORPHONE/SOD CHLOR,ISO/PF 2 MG/10 ML
0.5 SYRINGE (ML) INJECTION EVERY 4 HOURS
Refills: 0 | Status: DISCONTINUED | OUTPATIENT
Start: 2025-06-10 | End: 2025-06-11

## 2025-06-10 RX ORDER — DAPTOMYCIN 500 MG/10ML
600 INJECTION, POWDER, LYOPHILIZED, FOR SOLUTION INTRAVENOUS EVERY 24 HOURS
Refills: 0 | Status: DISCONTINUED | OUTPATIENT
Start: 2025-06-10 | End: 2025-06-11

## 2025-06-10 RX ORDER — HYDROMORPHONE/SOD CHLOR,ISO/PF 2 MG/10 ML
2 SYRINGE (ML) INJECTION EVERY 4 HOURS
Refills: 0 | Status: DISCONTINUED | OUTPATIENT
Start: 2025-06-10 | End: 2025-06-11

## 2025-06-10 RX ADMIN — Medication 500 MICROGRAM(S): at 05:42

## 2025-06-10 RX ADMIN — Medication 1 PACKET(S): at 13:40

## 2025-06-10 RX ADMIN — Medication 1 APPLICATION(S): at 05:42

## 2025-06-10 RX ADMIN — Medication 0.5 MILLIGRAM(S): at 09:41

## 2025-06-10 RX ADMIN — Medication 1 TABLET(S): at 18:08

## 2025-06-10 RX ADMIN — Medication 40 MILLIGRAM(S): at 05:42

## 2025-06-10 RX ADMIN — Medication 500 MICROGRAM(S): at 23:24

## 2025-06-10 RX ADMIN — Medication 1 APPLICATION(S): at 11:52

## 2025-06-10 RX ADMIN — FLUTICASONE PROPIONATE 1 SPRAY(S): 50 SPRAY, METERED NASAL at 18:08

## 2025-06-10 RX ADMIN — Medication 500 MICROGRAM(S): at 11:49

## 2025-06-10 RX ADMIN — Medication 2 MILLIGRAM(S): at 22:46

## 2025-06-10 RX ADMIN — SODIUM CHLORIDE 500 MILLILITER(S): 9 INJECTION, SOLUTION INTRAVENOUS at 16:43

## 2025-06-10 RX ADMIN — Medication 5 MILLIGRAM(S): at 11:49

## 2025-06-10 RX ADMIN — Medication 1 PACKET(S): at 09:18

## 2025-06-10 RX ADMIN — Medication 0.5 MILLIGRAM(S): at 04:02

## 2025-06-10 RX ADMIN — Medication 0.5 MILLIGRAM(S): at 09:18

## 2025-06-10 RX ADMIN — Medication 1 TABLET(S): at 09:02

## 2025-06-10 RX ADMIN — Medication 500 MICROGRAM(S): at 18:08

## 2025-06-10 RX ADMIN — Medication 2 MILLIGRAM(S): at 22:16

## 2025-06-10 RX ADMIN — Medication 0.5 MILLIGRAM(S): at 03:18

## 2025-06-10 RX ADMIN — SODIUM CHLORIDE 500 MILLILITER(S): 9 INJECTION, SOLUTION INTRAVENOUS at 14:19

## 2025-06-10 RX ADMIN — DAPTOMYCIN 124 MILLIGRAM(S): 500 INJECTION, POWDER, LYOPHILIZED, FOR SOLUTION INTRAVENOUS at 23:20

## 2025-06-10 NOTE — PROGRESS NOTE ADULT - ASSESSMENT
44 year old female, PMH pHTN (on treprostinil SQ pump) currently on lung transplant list, HF (s/p PPM dual chamber 2016), chronic PE (dx 2017) no longer on DOAC, SVT (s/p ablation 05/2020), asthma, with recurrent abscesses (abdominal wall, UE, most recently MRSA) p/w upper extremity abscess.    #MRSA bacteremia  --Continue daptomycin 600 mg IV every 24 hours  --please obtain TTE to evaluate valves- no evidence of vegetations  --will plan for a prolonged course given high grade bacteremia  - anticipate 4 weeks of daptomycin 6/2- 6/30/25    OPAT note:  Indication: MRSA bacteremia  Antibiotic; daptomycin 600 mg every 24 hours  Duration: through 6/30/25  Labs needed: CBC with diff, CMP, CPK weekly   Access; Left arm Picc line  Home infusion company:   Transplant ID follow up : to be scheduled with Dr Ash within 2-3 weeks       #Skin Abscess, Positive culture finding  --Continue daptomycin as above  --klebsiella ESBL in wound completed ertapenem for 7days- can stop now    --6/1/25 creatine kinase =40 next due 6/8= 56    #Human metapneumovirus infection  --Supportive care  --Contact isolation per infection control policy    #Pre-Lung Transplant Evaluation  COVID19 Nucleocapsid Antibody Negative  COVID19 Rajinder Antibody Positive  HAV IgG Negative  HBVs Ab Negative  HBVsAg Negative  HBVc Ab Negative  HCV Ab Negative  HSV 1 IgG Positive  HSV 2 IgG Negative  EBV IgG Positive  CMV IgG Positive  VZV IgG Positive  Measles IgG Positive  Mumps IgG Positive  Rubella IgG Positive  Quantiferon Gold Negative  HIV Ag/Ab by CMIA Negative  Syphilis Screen Negative  Toxoplasma IgG Negative  Strongyloides Ab Negative  Trypanosoma cruzi Ab Negative  --ID clearance for lung transplant pending resolution of LUE arm abscess and treatment of MRSA bacteremia    #Encounter to Vaccinate Patient  COVID19: Would benefit from COVID19 [id number] Vaccine Dose  Influenza: needs this next season ,   RSV: Arexvy 3/6/25  Pneumococcal: States she had pneumococcal vaccination ~2022  HAV: Would benefit from Havrix  HBV: Heplisav Dose 1 3/6/25. Dose 2 of Heplisav ordered  MMR: Rubella IgG pending. Mumps and Measles immune  Varicella: Immune will not require further vaccination  Shingles: Will recommend Shingrix  Tdap: Will recommend Tdap        Thank you for involving us in the care of this patient  Transplant ID will not continue following daily   Please call or page with additional questions  Pager; #[phone]  Teams: from 8 am to 5 pm  Margo Ash MD

## 2025-06-10 NOTE — PROGRESS NOTE ADULT - PROBLEM SELECTOR PLAN 1
Has a history of abscesses notably abdominal (grown MRSA and pseudomonas in the past). Current location LUE.   - s/p I&D, culture growing MRSA and ESBL Klebsiella   - blood cx growing MRSA, PPM removed 6/3  -completed 7 days of ertapenem  -continue on daptomycin, will need 4 week course  -ID recs appreciated  -Weekly CK and LFT's while on dapto  -Pain control prn. Ice packs. Lidocaine topical. -transition to oral pain control soon.  -Fluconazole 150mg po x 1 for vaginal yeast infection. Probiotics added.  -s/p picc 6/9

## 2025-06-10 NOTE — PROGRESS NOTE ADULT - SUBJECTIVE AND OBJECTIVE BOX
Transplant ID    Afebrile,   Picc line placed yesterday PM  having lose stools 2 Bm a day     Vital Signs Last 24 Hrs  T(C): 36.5 (10 Sudarshan 2025 09:10), Max: 36.8 (09 Jun 2025 20:46)  T(F): 97.7 (10 Sudarshan 2025 09:10), Max: 98.3 (09 Jun 2025 20:46)  HR: 98 (10 Sudarshan 2025 09:10) (93 - 102)  BP: 101/71 (10 Sudarshan 2025 09:10) (101/65 - 113/79)  BP(mean): --  RR: 18 (10 Sudarshan 2025 09:10) (18 - 18)  SpO2: 98% (10 Sudarshan 2025 09:10) (98% - 100%)    Parameters below as of 10 Sudarshan 2025 09:10  Patient On (Oxygen Delivery Method): nasal cannula  O2 Flow (L/min): 2      PHYSICAL EXAM:  Constitutional: no acute distress  Eyes:DEMETRIUS, EOMI  Ear/Nose/Throat: no oral lesions, 	  Respiratory: clear BL  Cardiovascular: S1S2  Gastrointestinal:soft, (+) BS, no tenderness  Extremities:no  LUE ace bandage wrapped  No Lymphadenopathy  IV sites not inflammed.  < end of copied text >

## 2025-06-10 NOTE — PROGRESS NOTE ADULT - PROBLEM SELECTOR PLAN 4
-previous on xarelto for VTE, stopped, continue SQH - likely can change to lovenox if stable.    5. Discussed plan with ACP Jaycee.
-previous on xarelto for VTE, stopped, continue SQH - likely can change to lovenox if stable.    5. Discussed plan with CASSIUS Olmos.
-previous on xarelto for VTE, stopped, continue SQH - likely can change to lovenox if stable - held post PPM removal, F/u EP when/if can be resumed.     5. Discussed plan with CASSIUS Craft and chris txp team.
-previous on xarelto for VTE, stopped, continue SQH - likely can change to lovenox if stable - held post PPM removal, F/u EP when/if can be resumed. -SCD's for now. OOB to chair.     5. Discussed plan with ACP Anju and ID.
-previous on xarelto for VTE, stopped, continue SQH - likely can change to lovenox if stable - held post PPM removal, F/u EP when/if can be resumed.     5. Discussed plan with CASSIUS Coyle.
-previous on xarelto for VTE, stopped, continue SQH - likely can change to lovenox if stable - held post PPM removal, F/u EP when/if can be resumed.     5. Discussed plan with CASSIUS Colvin and SHY.
-previous on xarelto for VTE, stopped prior to this admission, continue SQH - likely can change to lovenox if stable - held post PPM removal, F/u EP when/if can be resumed (per their last note, rec no heparin or lovenox post procedure and then signed off). -SCD's for now. OOB to chair.
-previous on xarelto for VTE, stopped, continue SQH - likely can change to lovenox if stable - held post PPM removal, F/u EP when/if can be resumed (per their last note, rec no heparin or lovenox post procedure and then signed off). -SCD's for now. OOB to chair.
-previous on xarelto for VTE, stopped, continue SQH - likely can change to lovenox if stable - held post PPM removal, F/u EP when/if can be resumed. -SCD's for now. OOB to chair.     5. Discussed plan with ACP Anju.
-previous on xarelto for VTE, stopped, continue SQH

## 2025-06-10 NOTE — PROGRESS NOTE ADULT - PROBLEM SELECTOR PROBLEM 2
Pulmonary hypertension, unspecified

## 2025-06-10 NOTE — PROGRESS NOTE ADULT - PROBLEM SELECTOR PLAN 2
-off of xarelto, nifedipine, diuretics  -continue remodulin - f/u with pulm/ID if pump needs to be adjusted with recurrent skin infections. -They are looking into this for outpatient - plan to change to winrevair or add it as adjunct soon per Dr Yoon as outpatient.   -Allergy appreciated; Zyrtec daily was added. Can try different adhesives. -Benadryl 50mg po q4h prn for now.   -continue atrovent  -continue 2L NC (baseline)  -Transplant candidacy reviewed, currently evaluation closed until BMI closer to target in addition to recurrent infections more under control. -Resume Ozempic once able. -Outpatient weight loss clinic f/u.   - F/U transplant pulm team for further recs  -Lasix 20mg IV x 1 6/5 given for LE edema per pulm.

## 2025-06-10 NOTE — PROGRESS NOTE ADULT - PROBLEM SELECTOR PLAN 3
recently started on pred taper and z pack for exacerbation  -s/p azithro  -continue pred taper (15mg 1 day->10mg 3days->5mg 3days) -completed.   -f/u pulm
recently started on pred taper and z pack for exacerbation  -s/p azithro  -continue pred taper (15mg 1 day->10mg 3days->5mg 3days) -completed.   -f/u pulm
recently started on pred taper and z pack for exacerbation  -s/p azithro  -continue pred taper (15mg 1 day->10mg 3days->5mg 3days)  -f/u pulm
recently started on pred taper and z pack for exacerbation  -s/p azithro  -continue pred taper (15mg 1 day->10mg 3days->5mg 3days) -completed.   -f/u pulm
recently started on pred taper and z pack for exacerbation  -s/p azithro  -continue pred taper (15mg 1 day->10mg 3days->5mg 3days)  -f/u pulm
recently started on pred taper and z pack for exacerbation  -s/p azithro  -continue pred taper (15mg 1 day->10mg 3days->5mg 3days) -completed.   -f/u pulm
recently started on pred taper and z pack for exacerbation  -continue azithro  -continue pred taper (15mg 1 day->10mg 3days->5mg 3days)
recently started on pred taper and z pack for exacerbation  -s/p azithro  -continue pred taper (15mg 1 day->10mg 3days->5mg 3days)  -f/u pulm
recently started on pred taper and z pack for exacerbation  -s/p azithro  -continue pred taper (15mg 1 day->10mg 3days->5mg 3days)  -f/u pulm
recently started on pred taper and z pack for exacerbation  -s/p azithro  -continue pred taper (15mg 1 day->10mg 3days->5mg 3days) -completed.   -f/u pulm

## 2025-06-10 NOTE — PROGRESS NOTE ADULT - SUBJECTIVE AND OBJECTIVE BOX
[facility] Division of Hospital Medicine  Sandrita Camara DO  Available on Teams Cathie    Patient is a 44y old  Female who presents with a chief complaint of Abscess (10 Sudarshan 2025 10:53)      SUBJECTIVE / OVERNIGHT EVENTS: None. Still having pain in her left upper chest where the PPM was removed.   ADDITIONAL REVIEW OF SYSTEMS: negative    MEDICATIONS  (STANDING):  cetirizine 5 milliGRAM(s) Oral daily  chlorhexidine 2% Cloths 1 Application(s) Topical daily  chlorhexidine 4% Liquid 1 Application(s) Topical <User Schedule>  DAPTOmycin IVPB 600 milliGRAM(s) IV Intermittent every 24 hours  fluticasone propionate 50 MICROgram(s)/spray Nasal Spray 1 Spray(s) Both Nostrils two times a day  ipratropium    for Nebulization 500 MICROGram(s) Nebulizer every 6 hours  lactobacillus acidophilus 1 Tablet(s) Oral two times a day with meals  lidocaine   4% Patch 1 Patch Transdermal daily  pantoprazole    Tablet 40 milliGRAM(s) Oral before breakfast  Treprostinil (Remodulin) subcutaneous continuous infusion 51 NANOgrams/kg/min 1 Pump(s) 1 Each SubCutaneous Continuous Pump  triamcinolone 0.1% Cream 1 Application(s) Topical two times a day    MEDICATIONS  (PRN):  acetaminophen     Tablet .. 650 milliGRAM(s) Oral every 6 hours PRN Mild Pain (1 - 3), Moderate Pain (4 - 6)  diphenhydrAMINE 50 milliGRAM(s) Oral every 4 hours PRN Rash and/or Itching  HYDROmorphone   Tablet 2 milliGRAM(s) Oral every 4 hours PRN Severe Pain (7 - 10)  HYDROmorphone  Injectable 0.5 milliGRAM(s) IV Push every 4 hours PRN breakthrough pain  sodium chloride 0.9% lock flush 10 milliLiter(s) IV Push every 1 hour PRN Pre/post blood products, medications, blood draw, and to maintain line patency      CAPILLARY BLOOD GLUCOSE        I&O's Summary      PHYSICAL EXAM:  Vital Signs Last 24 Hrs  T(C): 36.7 (10 Sudarshan 2025 11:51), Max: 36.8 (09 Jun 2025 20:46)  T(F): 98.1 (10 Sudarshan 2025 11:51), Max: 98.3 (09 Jun 2025 20:46)  HR: 102 (10 Sudarshan 2025 13:49) (93 - 102)  BP: 87/64 (10 Sudarshan 2025 13:49) (87/64 - 113/79)  BP(mean): --  RR: 18 (10 Sudarshan 2025 11:51) (18 - 18)  SpO2: 98% (10 Sudarshan 2025 11:51) (98% - 100%)    Parameters below as of 10 Sudarshan 2025 11:51  Patient On (Oxygen Delivery Method): nasal cannula  O2 Flow (L/min): 2      CONSTITUTIONAL: Well-groomed, in no apparent distress  EYES: No conjunctival or scleral injection, non-icteric; PERRLA and symmetric  ENMT: No external nasal lesions; no pharyngeal injection or exudates, oral mucosa with moist membranes  NECK: Trachea midline without palpable neck mass; thyroid not enlarged and non-tender  RESPIRATORY: Breathing comfortably; lungs CTA without wheeze/rhonchi/rales  CARDIOVASCULAR: +S1S2, RRR, no M/G/R; pedal pulses full and symmetric; no lower extremity edema. Left upper chest wall site of extracted PPM is clean/dry but tender to palpation  GASTROINTESTINAL: No palpable masses or tenderness, +BS throughout, no rebound/guarding  MUSCULOSKELETAL: no digital clubbing or cyanosis; no paraspinal tenderness; normal strength and tone of extremities. RUE w/infusion line  SKIN: No rashes or ulcers noted; no subcutaneous nodules or induration palpable  NEUROLOGIC: CN II-XII intact; sensation intact in LEs b/l to light touch  PSYCHIATRIC: A+O x 3; mood and affect appropriate; appropriate insight and judgment    LABS:                        10.8   9.94  )-----------( 339      ( 10 Sudarshan 2025 06:27 )             35.2     06-10    134[L]  |  101  |  5[L]  ----------------------------<  81  4.2   |  22  |  0.82    Ca    8.2[L]      10 Sudarshan 2025 06:27  Phos  2.5     06-10  Mg     2.0     06-10            Urinalysis Basic - ( 10 Sudarshan 2025 06:27 )    Color: x / Appearance: x / SG: x / pH: x  Gluc: 81 mg/dL / Ketone: x  / Bili: x / Urobili: x   Blood: x / Protein: x / Nitrite: x   Leuk Esterase: x / RBC: x / WBC x   Sq Epi: x / Non Sq Epi: x / Bacteria: x

## 2025-06-11 ENCOUNTER — TRANSCRIPTION ENCOUNTER (OUTPATIENT)
Age: 45
End: 2025-06-11

## 2025-06-11 VITALS
HEART RATE: 110 BPM | SYSTOLIC BLOOD PRESSURE: 115 MMHG | TEMPERATURE: 98 F | RESPIRATION RATE: 20 BRPM | DIASTOLIC BLOOD PRESSURE: 76 MMHG | OXYGEN SATURATION: 98 %

## 2025-06-11 LAB
ANION GAP SERPL CALC-SCNC: 11 MMOL/L — SIGNIFICANT CHANGE UP (ref 5–17)
BUN SERPL-MCNC: <4 MG/DL — LOW (ref 7–23)
CALCIUM SERPL-MCNC: 8.1 MG/DL — LOW (ref 8.4–10.5)
CHLORIDE SERPL-SCNC: 107 MMOL/L — SIGNIFICANT CHANGE UP (ref 96–108)
CO2 SERPL-SCNC: 22 MMOL/L — SIGNIFICANT CHANGE UP (ref 22–31)
CREAT SERPL-MCNC: 0.8 MG/DL — SIGNIFICANT CHANGE UP (ref 0.5–1.3)
EGFR: 93 ML/MIN/1.73M2 — SIGNIFICANT CHANGE UP
EGFR: 93 ML/MIN/1.73M2 — SIGNIFICANT CHANGE UP
GLUCOSE SERPL-MCNC: 80 MG/DL — SIGNIFICANT CHANGE UP (ref 70–99)
HCT VFR BLD CALC: 34.2 % — LOW (ref 34.5–45)
HGB BLD-MCNC: 10.6 G/DL — LOW (ref 11.5–15.5)
MAGNESIUM SERPL-MCNC: 2.1 MG/DL — SIGNIFICANT CHANGE UP (ref 1.6–2.6)
MCHC RBC-ENTMCNC: 22.8 PG — LOW (ref 27–34)
MCHC RBC-ENTMCNC: 31 G/DL — LOW (ref 32–36)
MCV RBC AUTO: 73.5 FL — LOW (ref 80–100)
NRBC BLD AUTO-RTO: 0 /100 WBCS — SIGNIFICANT CHANGE UP (ref 0–0)
PHOSPHATE SERPL-MCNC: 3.2 MG/DL — SIGNIFICANT CHANGE UP (ref 2.5–4.5)
PLATELET # BLD AUTO: 368 K/UL — SIGNIFICANT CHANGE UP (ref 150–400)
POTASSIUM SERPL-MCNC: 4 MMOL/L — SIGNIFICANT CHANGE UP (ref 3.5–5.3)
POTASSIUM SERPL-SCNC: 4 MMOL/L — SIGNIFICANT CHANGE UP (ref 3.5–5.3)
RBC # BLD: 4.65 M/UL — SIGNIFICANT CHANGE UP (ref 3.8–5.2)
RBC # FLD: 17.3 % — HIGH (ref 10.3–14.5)
SODIUM SERPL-SCNC: 140 MMOL/L — SIGNIFICANT CHANGE UP (ref 135–145)
WBC # BLD: 10.02 K/UL — SIGNIFICANT CHANGE UP (ref 3.8–10.5)
WBC # FLD AUTO: 10.02 K/UL — SIGNIFICANT CHANGE UP (ref 3.8–10.5)

## 2025-06-11 PROCEDURE — 87324 CLOSTRIDIUM AG IA: CPT

## 2025-06-11 PROCEDURE — C2629: CPT

## 2025-06-11 PROCEDURE — 82550 ASSAY OF CK (CPK): CPT

## 2025-06-11 PROCEDURE — 87640 STAPH A DNA AMP PROBE: CPT

## 2025-06-11 PROCEDURE — 93325 DOPPLER ECHO COLOR FLOW MAPG: CPT

## 2025-06-11 PROCEDURE — C1887: CPT

## 2025-06-11 PROCEDURE — 99232 SBSQ HOSP IP/OBS MODERATE 35: CPT

## 2025-06-11 PROCEDURE — 83735 ASSAY OF MAGNESIUM: CPT

## 2025-06-11 PROCEDURE — 36415 COLL VENOUS BLD VENIPUNCTURE: CPT

## 2025-06-11 PROCEDURE — 86901 BLOOD TYPING SEROLOGIC RH(D): CPT

## 2025-06-11 PROCEDURE — 86140 C-REACTIVE PROTEIN: CPT

## 2025-06-11 PROCEDURE — 96375 TX/PRO/DX INJ NEW DRUG ADDON: CPT

## 2025-06-11 PROCEDURE — 99285 EMERGENCY DEPT VISIT HI MDM: CPT | Mod: 25

## 2025-06-11 PROCEDURE — 85027 COMPLETE CBC AUTOMATED: CPT

## 2025-06-11 PROCEDURE — 94640 AIRWAY INHALATION TREATMENT: CPT

## 2025-06-11 PROCEDURE — 86850 RBC ANTIBODY SCREEN: CPT

## 2025-06-11 PROCEDURE — C1773: CPT

## 2025-06-11 PROCEDURE — 84100 ASSAY OF PHOSPHORUS: CPT

## 2025-06-11 PROCEDURE — 36573 INSJ PICC RS&I 5 YR+: CPT

## 2025-06-11 PROCEDURE — C1892: CPT

## 2025-06-11 PROCEDURE — 93320 DOPPLER ECHO COMPLETE: CPT

## 2025-06-11 PROCEDURE — 87040 BLOOD CULTURE FOR BACTERIA: CPT

## 2025-06-11 PROCEDURE — C1769: CPT

## 2025-06-11 PROCEDURE — 76000 FLUOROSCOPY <1 HR PHYS/QHP: CPT

## 2025-06-11 PROCEDURE — 0241U: CPT

## 2025-06-11 PROCEDURE — 93005 ELECTROCARDIOGRAM TRACING: CPT

## 2025-06-11 PROCEDURE — 93306 TTE W/DOPPLER COMPLETE: CPT

## 2025-06-11 PROCEDURE — 87637 SARSCOV2&INF A&B&RSV AMP PRB: CPT

## 2025-06-11 PROCEDURE — 85610 PROTHROMBIN TIME: CPT

## 2025-06-11 PROCEDURE — C1894: CPT

## 2025-06-11 PROCEDURE — 80048 BASIC METABOLIC PNL TOTAL CA: CPT

## 2025-06-11 PROCEDURE — 87070 CULTURE OTHR SPECIMN AEROBIC: CPT

## 2025-06-11 PROCEDURE — 85025 COMPLETE CBC W/AUTO DIFF WBC: CPT

## 2025-06-11 PROCEDURE — 87150 DNA/RNA AMPLIFIED PROBE: CPT

## 2025-06-11 PROCEDURE — 96374 THER/PROPH/DIAG INJ IV PUSH: CPT

## 2025-06-11 PROCEDURE — 80053 COMPREHEN METABOLIC PANEL: CPT

## 2025-06-11 PROCEDURE — 85652 RBC SED RATE AUTOMATED: CPT

## 2025-06-11 PROCEDURE — 86900 BLOOD TYPING SEROLOGIC ABO: CPT

## 2025-06-11 PROCEDURE — 86923 COMPATIBILITY TEST ELECTRIC: CPT

## 2025-06-11 PROCEDURE — 0225U NFCT DS DNA&RNA 21 SARSCOV2: CPT

## 2025-06-11 PROCEDURE — 85730 THROMBOPLASTIN TIME PARTIAL: CPT

## 2025-06-11 PROCEDURE — 73201 CT UPPER EXTREMITY W/DYE: CPT

## 2025-06-11 PROCEDURE — C9399: CPT

## 2025-06-11 PROCEDURE — 99239 HOSP IP/OBS DSCHRG MGMT >30: CPT

## 2025-06-11 PROCEDURE — 84145 PROCALCITONIN (PCT): CPT

## 2025-06-11 PROCEDURE — 71045 X-RAY EXAM CHEST 1 VIEW: CPT

## 2025-06-11 PROCEDURE — 87077 CULTURE AEROBIC IDENTIFY: CPT

## 2025-06-11 PROCEDURE — 87186 SC STD MICRODIL/AGAR DIL: CPT

## 2025-06-11 PROCEDURE — 81025 URINE PREGNANCY TEST: CPT

## 2025-06-11 PROCEDURE — 87641 MR-STAPH DNA AMP PROBE: CPT

## 2025-06-11 PROCEDURE — 87449 NOS EACH ORGANISM AG IA: CPT

## 2025-06-11 RX ORDER — LACTOBACILLUS ACIDOPHILUS/PECT 75 MM-100
1 CAPSULE ORAL
Qty: 30 | Refills: 0
Start: 2025-06-11 | End: 2025-07-10

## 2025-06-11 RX ADMIN — Medication 500 MICROGRAM(S): at 11:10

## 2025-06-11 RX ADMIN — Medication 500 MICROGRAM(S): at 17:12

## 2025-06-11 RX ADMIN — Medication 40 MILLIGRAM(S): at 05:15

## 2025-06-11 RX ADMIN — Medication 1 TABLET(S): at 17:12

## 2025-06-11 RX ADMIN — Medication 1 APPLICATION(S): at 11:10

## 2025-06-11 RX ADMIN — FLUTICASONE PROPIONATE 1 SPRAY(S): 50 SPRAY, METERED NASAL at 05:16

## 2025-06-11 RX ADMIN — Medication 1 APPLICATION(S): at 07:01

## 2025-06-11 RX ADMIN — Medication 1 TABLET(S): at 08:44

## 2025-06-11 RX ADMIN — Medication 5 MILLIGRAM(S): at 11:10

## 2025-06-11 RX ADMIN — Medication 500 MICROGRAM(S): at 05:16

## 2025-06-11 RX ADMIN — DAPTOMYCIN 124 MILLIGRAM(S): 500 INJECTION, POWDER, LYOPHILIZED, FOR SOLUTION INTRAVENOUS at 17:11

## 2025-06-11 NOTE — DISCHARGE NOTE PROVIDER - NSDCMRMEDTOKEN_GEN_ALL_CORE_FT
Atrovent 18 mcg/inh inhalation aerosol: 1 inhaled 4 times a day as needed for  shortness of breath and/or wheezing  Daptomycin 600 mg IV every 24 hrs (6/2- 6/30),: As Directed. Access: Left Arm PICC line  Fasenra Prefilled Syringe 30 mg/mL subcutaneous solution: 30 milligram(s) subcutaneously every 4 weeks . Next dose planned for Friday 6/6/25  Flonase 50 mcg/inh nasal spray: 1 spray(s) in each nostril once a day  montelukast 10 mg oral tablet: 1 tab(s) orally once a day  Please draw labs weekly: CBC with diff, CMP, CPK and send it to Margo Johnston: As Directed  Remodulin 5 mg/mL injectable solution: 1 application injectable once a day via her own SQ PUMP  semaglutide 1 mg/0.5 mL (1 mg dose) subcutaneous solution: 1 milligram(s) subcutaneously once a week on Fridays   Atrovent 18 mcg/inh inhalation aerosol: 1 inhaled 4 times a day as needed for  shortness of breath and/or wheezing  cetirizine 5 mg oral tablet: 1 tab(s) orally once a day  Daptomycin 600 mg IV every 24 hrs (6/2- 6/30),: As Directed. Access: Left Arm PICC line  Fasenra Prefilled Syringe 30 mg/mL subcutaneous solution: 30 milligram(s) subcutaneously every 4 weeks . Next dose planned for Friday 6/6/25  Flonase 50 mcg/inh nasal spray: 1 spray(s) in each nostril once a day  lactobacillus acidophilus oral capsule: 1 tablet with sensor orally once a day  montelukast 10 mg oral tablet: 1 tab(s) orally once a day  pantoprazole 40 mg oral delayed release tablet: 1 tab(s) orally once a day (before a meal)  Please draw labs weekly: CBC with diff, CMP, CPK and send it to Margo Johnston: As Directed  Remodulin 5 mg/mL injectable solution: 1 application injectable once a day via her own SQ PUMP  semaglutide 1 mg/0.5 mL (1 mg dose) subcutaneous solution: 1 milligram(s) subcutaneously once a week on Fridays

## 2025-06-11 NOTE — DISCHARGE NOTE PROVIDER - NSDCFUSCHEDAPPT_GEN_ALL_CORE_FT
[facility]  ELECTROPH [address]  Scheduled Appointment: 06/16/2025    Cherelle Harvey  [facility]  [facility]  Scheduled Appointment: 06/27/2025    Mckay Fritz  [facility]  PULMMED [address]  Scheduled Appointment: 06/27/2025    Patricia Monae  [facility]  PEDALLERGY [address] [facility]  Scheduled Appointment: 08/08/2025

## 2025-06-11 NOTE — PROGRESS NOTE ADULT - REASON FOR ADMISSION
Abscess

## 2025-06-11 NOTE — PROGRESS NOTE ADULT - SUBJECTIVE AND OBJECTIVE BOX
Lung Transplant Progress Note  =====================================================  24 hour events: Pt seen and examined bedside, breathing comfortably on 2LNC    T(C): 36.4 (06-11-25 @ 05:12), Max: 36.9 (06-11-25 @ 00:00)  HR: 101 (06-11-25 @ 05:12) (97 - 110)  BP: 100/63 (06-11-25 @ 05:12) (86/62 - 109/74)  RR: 18 (06-11-25 @ 05:12) (18 - 20)  SpO2: 99% (06-11-25 @ 05:12) (98% - 100%)    PAST MEDICAL & SURGICAL HISTORY:  Anemia      Pulmonary hypertension      Asthma  On home O2 nightly at 2L via NC      PE (pulmonary thromboembolism)  on Xarelto 10 mg daily      Sinusitis      Right heart failure      H/O pulmonary hypertension      Pulmonary embolism      History of tachycardia      On supplemental oxygen by nasal cannula  O2 @ 2L      Pacemaker      Right atrial thrombus      Hypotension      Pacemaker      History of pacemaker  12/19 - [employer] model Ingevity 4469        Home Meds: Home Medications:  Atrovent 18 mcg/inh inhalation aerosol: 1 inhaled 4 times a day as needed for  shortness of breath and/or wheezing (04 Jun 2025 14:05)  Fasenra Prefilled Syringe 30 mg/mL subcutaneous solution: 30 milligram(s) subcutaneously every 4 weeks . Next dose planned for Friday 6/6/25 (04 Jun 2025 14:05)  Flonase 50 mcg/inh nasal spray: 1 spray(s) in each nostril once a day (04 Jun 2025 14:05)  montelukast 10 mg oral tablet: 1 tab(s) orally once a day (04 Jun 2025 14:05)  Remodulin 5 mg/mL injectable solution: 1 application injectable once a day via her own SQ PUMP (04 Jun 2025 14:05)  semaglutide 1 mg/0.5 mL (1 mg dose) subcutaneous solution: 1 milligram(s) subcutaneously once a week on Fridays (04 Jun 2025 14:05)    Allergies: Allergies    vancomycin (Rash)  adhesives (Rash)    Intolerances        REVIEW OF SYSTEMS    [x] A ten-point review of systems was otherwise negative except as noted.  [ ] Due to altered mental status/intubation, subjective information were not able to be obtained from the patient. History was obtained, to the extent possible, from review of the chart and collateral sources of information.      CURRENT MEDICATIONS:   Neurologic Medications  acetaminophen     Tablet .. 650 milliGRAM(s) Oral every 6 hours PRN Mild Pain (1 - 3), Moderate Pain (4 - 6)  diphenhydrAMINE 50 milliGRAM(s) Oral every 4 hours PRN Rash and/or Itching  HYDROmorphone   Tablet 2 milliGRAM(s) Oral every 4 hours PRN Severe Pain (7 - 10)  HYDROmorphone  Injectable 0.5 milliGRAM(s) IV Push every 4 hours PRN breakthrough pain    Respiratory Medications  cetirizine 5 milliGRAM(s) Oral daily  ipratropium    for Nebulization 500 MICROGram(s) Nebulizer every 6 hours    Cardiovascular Medications    Gastrointestinal Medications  pantoprazole    Tablet 40 milliGRAM(s) Oral before breakfast  sodium chloride 0.9% lock flush 10 milliLiter(s) IV Push every 1 hour PRN Pre/post blood products, medications, blood draw, and to maintain line patency    Genitourinary Medications    Hematologic/Oncologic Medications    Antimicrobial/Immunologic Medications  DAPTOmycin IVPB 600 milliGRAM(s) IV Intermittent every 24 hours    Endocrine/Metabolic Medications    Topical/Other Medications  chlorhexidine 2% Cloths 1 Application(s) Topical daily  chlorhexidine 4% Liquid 1 Application(s) Topical <User Schedule>  fluticasone propionate 50 MICROgram(s)/spray Nasal Spray 1 Spray(s) Both Nostrils two times a day  lactobacillus acidophilus 1 Tablet(s) Oral two times a day with meals  lidocaine   4% Patch 1 Patch Transdermal daily  Treprostinil (Remodulin) subcutaneous continuous infusion 51 NANOgrams/kg/min 1 Pump(s) 1 Each SubCutaneous Continuous Pump  triamcinolone 0.1% Cream 1 Application(s) Topical two times a day      INS/OUTS (last 24 hours):  I&O's Summary    --------------------------------------------------------------------------------------    PHYSICAL EXAM:  GENERAL: NAD, resting comfortably in bed  HEAD:  Atraumatic, normocephalic  EYES: conjunctiva and sclera clear  NECK: Supple  CHEST/LUNG: CTA B/L, good inspiratory effort  HEART: RRR. +S1/S2  ABDOMEN: Soft, nondistended, nontender to palpation  EXTREMITIES: No clubbing, cyanosis, or edema  PSYCH: A&O   NEUROLOGY: Non-focal, motor & sensory grossly intact  SKIN: Warm & dry, no rashes or lesions    LABS:                        10.6   10.02 )-----------( 368      ( 11 Jun 2025 06:01 )             34.2     06-11    140  |  107  |  <4[L]  ----------------------------<  80  4.0   |  22  |  0.80    Ca    8.1[L]      11 Jun 2025 06:01  Phos  3.2     06-11  Mg     2.1     06-11        Urinalysis Basic - ( 11 Jun 2025 06:01 )    Color: x / Appearance: x / SG: x / pH: x  Gluc: 80 mg/dL / Ketone: x  / Bili: x / Urobili: x   Blood: x / Protein: x / Nitrite: x   Leuk Esterase: x / RBC: x / WBC x   Sq Epi: x / Non Sq Epi: x / Bacteria: x        CAPILLARY BLOOD GLUCOSE          RADIOLOGY:

## 2025-06-11 NOTE — PROVIDER CONTACT NOTE (OTHER) - SITUATION
Patient has no IV access. Unable to give DAPTOMYCIN 6/2
pt is refusing bed alarm
pt hypotensive 86/62 after bolus 250ml
pt hypotensive 87/64

## 2025-06-11 NOTE — DISCHARGE NOTE NURSING/CASE MANAGEMENT/SOCIAL WORK - NSSCCONTNUM_GEN_ALL_CORE
[facility] Phone: [phone]  /[facility] ([facility]) Phone: [phone]

## 2025-06-11 NOTE — DISCHARGE NOTE NURSING/CASE MANAGEMENT/SOCIAL WORK - FINANCIAL ASSISTANCE
[facility] provides services at a reduced cost to those who are determined to be eligible through [facility]’s financial assistance program. Information regarding [facility]’s financial assistance program can be found by going to [email or web address] or by calling [phone].

## 2025-06-11 NOTE — DISCHARGE NOTE NURSING/CASE MANAGEMENT/SOCIAL WORK - PATIENT PORTAL LINK FT
You can access the FollowMyHealth Patient Portal offered by [facility] by registering at the following website: [email or web address]. By joining [employer]’s FollowMyHealth portal, you will also be able to view your health information using other applications (apps) compatible with our system.

## 2025-06-11 NOTE — DISCHARGE NOTE PROVIDER - CARE PROVIDER_API CALL
Vivian Yoon  Pulmonary Disease  [address], [address]  [place] [address]  Phone: [phone]  Fax: [phone]  Follow Up Time: 1 week    Margo Fernandez  Infectious Disease  [address] [place] [address]  Phone: [phone]  Fax: [phone]  Follow Up Time:    Vivian Yoon  Pulmonary Disease  [address], [address]  [place] [address]  Phone: [phone]  Fax: [phone]  Follow Up Time: 1 week    Margo Fernandez  Infectious Disease  [address] [place] [address]  Phone: [phone]  Fax: [phone]  Follow Up Time:     Hal Nguyen  Cardiac Electrophysiology  [address] [place] [address]  Phone: [phone]  Fax: [phone]  Scheduled Appointment: 06/16/2025 11:40 AM    Alejandra Powell  Cardiac Electrophysiology  [address] [place] [address], [address]  [place] [address]  Phone: [phone]  Fax: [phone]  Scheduled Appointment: 06/17/2025 09:40 AM

## 2025-06-11 NOTE — PROGRESS NOTE ADULT - TIME BILLING
face-to-face encounter, review of extensive medical records in this and prior charts, laboratory findings, radiographic and microbiology results; documentation as noted above and discussion of diagnostic impressions and plan with the patient and team
Medical management as above, review of results/records, discussion with patient and primary team, documentation.
chart and data review, clinical assessment, and coordination of care. This excludes any time spent on separate procedures or teaching.
chart and data review, clinical assessment, and coordination of care. This excludes any time spent on separate procedures or teaching.
conducting Interview, examination, reviewing vitals, laboratory and radiological images; besides coordinating care with primary team and consultants.
reviewing chart and coordinating care with primary team/staff, as well as reviewing vitals, radiology, medication list, recent labs, and prior records.
Medical management as above, review of results/records, discussion with patient and primary team, documentation.
Medical management as above, review of results/records, discussion with patient and primary team, documentation.
Time-based billing (NON-critical care).     The necessity of the time spent during the encounter on this date of service was due to:     - Ordering, reviewing, and interpreting labs, testing, and imaging.  - Independently obtaining a review of systems and performing a physical exam  - Reviewing prior hospitalization and where necessary, outpatient records.  - Counselling and educating patient and family regarding interpretation of aforementioned items and plan of care.
conducting Interview, examination, reviewing vitals, laboratory and radiological images; besides coordinating care with primary team and consultants.
conducting Interview, examination, reviewing vitals, laboratory and radiological images; besides coordinating care with primary team and consultants.

## 2025-06-11 NOTE — DISCHARGE NOTE PROVIDER - CARE PROVIDERS DIRECT ADDRESSES
[email or web address]

## 2025-06-11 NOTE — DISCHARGE NOTE NURSING/CASE MANAGEMENT/SOCIAL WORK - NSDCVIVACCINE_GEN_ALL_CORE_FT
HepB-CpG; 23-Apr-2025 14:55; Sagar Hansen); [employer] [id number] (Exp. Date: 31-Jan-2027); IntraMuscular; Deltoid Left.; 20 MICROGram(s); VIS (VIS Published: 31-Jan-2025, VIS Presented: 23-Apr-2025);

## 2025-06-11 NOTE — PROVIDER CONTACT NOTE (OTHER) - BACKGROUND
Patient dx with Cutaneous abscess of left upper extremity. HX of severe pulmonary hypertension
Pt admitted for abscess of L arm. PMH severe pulmonary HTN on home remodulin infusion.

## 2025-06-11 NOTE — PROVIDER CONTACT NOTE (OTHER) - ACTION/TREATMENT ORDERED:
Provider notified and aware. bolus 500 ml order
Pt educated on fall risk & importance of bed alarm for pt safety. Provider aware, okay to allow refusal at this time.
Provider notified and aware. Awaiting response from clinical impact team for IV access.
Provider notified and aware. bolus 250 ml order

## 2025-06-11 NOTE — DISCHARGE NOTE PROVIDER - HOSPITAL COURSE
HPI:  45 y/o female, PMH pHTN (on treprostinil SQ pump) currently on lung transplant list, HF (s/p PPM dual chamber 2016), chronic PE (dx 2017) no longer on DOAC, SVT (s/p ablation 05/2020), asthma, with recurrent abscesses (abdominal wall, UE, most recently MRSA) p/w upper extremity abscess. Patient recently presented to outpatient pulm for sob. Started on prednisone and z pack. At that time also noted to have worsening L upper extremity redness and pain, radiating down the extremity. Denies recent trauma to the area, recent travel, fevers/chills/drainage. Given the presentation, advised to present to the ED.    In the ED afeb hds. Labs notable for wbc 11.4 (84% neutrophils), BMP wnl. s/p I&D in the ER. Started on doxycycline.  (31 May 2025 11:34)    Hospital Course: Patient was admitted for further evaluation of left upper extremity abscess. Patient has a history of abscesses, notably abdominal, with prior cultures growing MRSA and Pseudomonas. I&D of the current LUE abscess yielded cultures positive for MRSA and ESBL-producing Klebsiella. Blood cultures also grew MRSA. Patient's PPM was removed on 6/3, and patient completed a 7-day course of ertapenem. Patient will continue on daptomycin for a 4-week course, per ID recommendations. Weekly creatine kinase and liver function tests to be monitored while on daptomycin. Patient was also treated for a vaginal yeast infection with a single dose of fluconazole 150mg and probiotics. A new PICC line was placed on 6/9.    Patient also has pulmonary hypertension.  Xarelto, nifedipine, and diuretics were discontinued during this admission, while remodulin was continued.  There are outpatient plans to switch to or add winrevair as an adjunct, per Dr. oYon.  Due to a documented allergy, patient was started on daily Zyrtec and PRN Benadryl 50mg every 4 hours.  Different adhesive options to be explored.  Patient continues on atrovent and 2L nasal cannula (baseline).  Transplant candidacy was reviewed, but evaluation is currently closed until patient's BMI is closer to target and recurrent infections are better controlled. Patient will resume Ozempic when feasible and has a follow-up scheduled with an outpatient weight loss clinic.  The transplant pulmonary team to also follow up.  Lasix 20mg IV was given on 6/5 for lower extremity edema, per Pulmonary.    Patient also has asthma, recently treated with an azithromycin course and prednisone taper for an exacerbation, both of which are now complete. For DVT prophylaxis, prior home xarelto (held pre-admission and post-PPM removal) pending further recommendations from Electrophysiology?? on when to re-instate. Currently, patient is using sequential compression devices (SCDs) and is out of bed to chair.    Discharge/Dispo/Med rec discussed with attending  ____. Patient medically cleared for discharge with outpatient follow up      Important Medication Changes and Reason:  - to continue on daptomycin for a 4-week course, per ID recommendations. Weekly creatine kinase (CK) and liver function tests (LFTs) to be monitored while on daptomycin.     Active or Pending Issues Requiring Follow-up:  - Continue follow up as outpatient with lung transplant team to monitor for recurrence of infection and monitor for weight loss.  - Outpatient follow up with weight loss clinic and pursue continued therapy with Ozempic  - Outpatient follow up with Dr. Yoon for PH management.  - Follow-up with primary care physician within 1 week.   - follow up with ID    Advanced Directives:   [ ] Full code  [ ] DNR  [ ] Hospice    Discharge Diagnoses:         HPI:  43 y/o female, PMH pHTN (on treprostinil SQ pump) currently on lung transplant list, HF (s/p PPM dual chamber 2016), chronic PE (dx 2017) no longer on DOAC, SVT (s/p ablation 05/2020), asthma, with recurrent abscesses (abdominal wall, UE, most recently MRSA) p/w upper extremity abscess. Patient recently presented to outpatient pulm for sob. Started on prednisone and z pack. At that time also noted to have worsening L upper extremity redness and pain, radiating down the extremity. Denies recent trauma to the area, recent travel, fevers/chills/drainage. Given the presentation, advised to present to the ED.    In the ED afeb hds. Labs notable for wbc 11.4 (84% neutrophils), BMP wnl. s/p I&D in the ER. Started on doxycycline.  (31 May 2025 11:34)    Hospital Course: Patient was admitted for further evaluation of left upper extremity abscess. Patient has a history of abscesses, notably abdominal, with prior cultures growing MRSA and Pseudomonas. I&D of the current LUE abscess yielded cultures positive for MRSA and ESBL-producing Klebsiella. Blood cultures also grew MRSA. Patient's PPM was removed on 6/3, and patient completed a 7-day course of ertapenem. Patient will continue on daptomycin for a 4-week course, per ID recommendations. Weekly creatine kinase and liver function tests to be monitored while on daptomycin. Patient was also treated for a vaginal yeast infection with a single dose of fluconazole 150mg and probiotics. A new PICC line was placed on 6/9.    Patient also has pulmonary hypertension.  Xarelto, nifedipine, and diuretics were discontinued during this admission, while remodulin was continued.  There are outpatient plans to switch to or add winrevair as an adjunct, per Dr. Yoon.  Due to a documented allergy, patient was started on daily Zyrtec and PRN Benadryl 50mg every 4 hours.  Different adhesive options to be explored.  Patient continues on atrovent and 2L nasal cannula (baseline).  Transplant candidacy was reviewed, but evaluation is currently closed until patient's BMI is closer to target and recurrent infections are better controlled. Patient will resume Ozempic when feasible and has a follow-up scheduled with an outpatient weight loss clinic.  The transplant pulmonary team to also follow up.  Lasix 20mg IV was given on 6/5 for lower extremity edema, per Pulmonary.    Patient also has asthma, recently treated with an azithromycin course and prednisone taper for an exacerbation, both of which are now complete. For DVT prophylaxis, patient is using sequential compression devices (SCDs). No longer on DOAC since 04/25.    Important Medication Changes and Reason:  - to continue on daptomycin for a 4-week course, per ID recommendations. Weekly creatine kinase (CK) and liver function tests (LFTs) to be monitored while on daptomycin.     Active or Pending Issues Requiring Follow-up:  - Continue follow up as outpatient with lung transplant team to monitor for recurrence of infection and monitor for weight loss.  - Outpatient follow up with weight loss clinic and pursue continued therapy with Ozempic  - Outpatient follow up with Dr. Yoon for PH management.  - Follow-up with primary care physician within 1 week.   - follow up with ID  - follow up in EP clinic for wound check as scheduled on 6/16/25 at 11:40 AM    Advanced Directives:   [x] Full code  [ ] DNR  [ ] Hospice    Discharge Diagnoses:  Left arm abscess  MRSA bacteremia  pulmonary hypertension  asthma   HPI:  43 y/o female, PMH pHTN (on treprostinil SQ pump) currently on lung transplant list, HF (s/p PPM dual chamber 2016), chronic PE (dx 2017) no longer on DOAC, SVT (s/p ablation 05/2020), asthma, with recurrent abscesses (abdominal wall, UE, most recently MRSA) p/w upper extremity abscess. Patient recently presented to outpatient pulm for sob. Started on prednisone and z pack. At that time also noted to have worsening L upper extremity redness and pain, radiating down the extremity. Denies recent trauma to the area, recent travel, fevers/chills/drainage. Given the presentation, advised to present to the ED.    In the ED afeb hds. Labs notable for wbc 11.4 (84% neutrophils), BMP wnl. s/p I&D in the ER. Started on doxycycline.  (31 May 2025 11:34)    Hospital Course: Patient was admitted for further evaluation of left upper extremity abscess. Patient has a history of abscesses, notably abdominal, with prior cultures growing MRSA and Pseudomonas. I&D of the current LUE abscess yielded cultures positive for MRSA and ESBL-producing Klebsiella. Blood cultures also grew MRSA. Patient's PPM was removed on 6/3, and patient completed a 7-day course of ertapenem. Patient will continue on daptomycin for a 4-week course, per ID recommendations. Weekly creatine kinase and liver function tests to be monitored while on daptomycin. Patient was also treated for a vaginal yeast infection with a single dose of fluconazole 150mg and probiotics. A new PICC line was placed on 6/9.    Patient also has pulmonary hypertension.  Xarelto, nifedipine, and diuretics were discontinued during this admission, while remodulin was continued.  There are outpatient plans to switch to or add winrevair as an adjunct, per Dr. Yoon.  Due to a documented allergy, patient was started on daily Zyrtec and PRN Benadryl 50mg every 4 hours.  Different adhesive options to be explored.  Patient continues on atrovent and 2L nasal cannula (baseline).  Transplant candidacy was reviewed, but evaluation is currently closed until patient's BMI is closer to target and recurrent infections are better controlled. Patient will resume Ozempic when feasible and has a follow-up scheduled with an outpatient weight loss clinic.  The transplant pulmonary team to also follow up.  Lasix 20mg IV was given on 6/5 for lower extremity edema, per Pulmonary.    Patient also has asthma, recently treated with an azithromycin course and prednisone taper for an exacerbation, both of which are now complete. For DVT prophylaxis, patient is using sequential compression devices (SCDs). No longer on DOAC since 04/25.    Important Medication Changes and Reason:  - to continue on daptomycin for a 4-week course, per ID recommendations. Weekly creatine kinase (CK) and liver function tests (LFTs) to be monitored while on daptomycin.     Active or Pending Issues Requiring Follow-up:  - Continue follow up as outpatient with lung transplant team to monitor for recurrence of infection and monitor for weight loss.  - Outpatient follow up with weight loss clinic and pursue continued therapy with Ozempic  - Outpatient follow up with Dr. Yoon for PH management.  - Follow-up with primary care physician within 1 week.   - follow up with ID  - follow up in EP clinic for wound check as scheduled on 6/16/25 at 11:40 AM  - follow up with EP for ppm reimplantation eval    Advanced Directives:   [x] Full code  [ ] DNR  [ ] Hospice    Discharge Diagnoses:  Left arm abscess  MRSA bacteremia  pulmonary hypertension  asthma   HPI:  43 y/o female, PMH pHTN (on treprostinil SQ pump) currently on lung transplant list, HF (s/p PPM dual chamber 2016), chronic PE (dx 2017) no longer on DOAC, SVT (s/p ablation 05/2020), asthma, with recurrent abscesses (abdominal wall, UE, most recently MRSA) p/w upper extremity abscess. Patient recently presented to outpatient pulm for sob. Started on prednisone and z pack. At that time also noted to have worsening L upper extremity redness and pain, radiating down the extremity. Denies recent trauma to the area, recent travel, fevers/chills/drainage. Given the presentation, advised to present to the ED.    In the ED afeb hds. Labs notable for wbc 11.4 (84% neutrophils), BMP wnl. s/p I&D in the ER. Started on doxycycline.  (31 May 2025 11:34)    Hospital Course: Patient was admitted for further evaluation of left upper extremity abscess. Patient has a history of abscesses, notably abdominal, with prior cultures growing MRSA and Pseudomonas. I&D of the current LUE abscess yielded cultures positive for MRSA and ESBL-producing Klebsiella. Blood cultures also grew MRSA. Patient's PPM was removed on 6/3, and patient completed a 7-day course of ertapenem. Patient will continue on daptomycin for a 4-week course, per ID recommendations. Weekly creatine kinase and liver function tests to be monitored while on daptomycin. Patient was also treated for a vaginal yeast infection with a single dose of fluconazole 150mg and probiotics. A new PICC line was placed on 6/9. Continue with daptomycin until 6/30/25. Follow up with ID in 7-10 days from discharge.     Patient also has pulmonary hypertension. Patient's home spironolactone, nifedipine, and Bumex were stopped during this admission; patient is to continue holding these medications on discharge and follow up with pulmonology in order to determine if there are any medication changes that could be made as an outpatient to further optimize the patient. Transplant candidacy was reviewed, but evaluation is currently closed until patient's BMI is closer to target and recurrent infections are better controlled. Patient will resume Ozempic when feasible and has a follow-up scheduled with an outpatient weight loss clinic. The transplant pulmonary team to also follow up in 5-7 days from discharge. Lasix 20mg IV was given on 6/5 for lower extremity edema, per Pulmonary.    Patient also has asthma, recently treated with an azithromycin course and prednisone taper for an exacerbation, both of which are now complete.     Patient with diarrhea since starting daptomycin. Asked to be tested for C diff and informed of the results after discharge.     Important Medication Changes and Reason:  - to continue on daptomycin for a 4-week course, per ID recommendations. Weekly creatine kinase (CK) and liver function tests (LFTs) to be monitored while on daptomycin.     Active or Pending Issues Requiring Follow-up:  - Continue follow up as outpatient with lung transplant team to monitor for recurrence of infection and monitor for weight loss; follow up in 10-14 days   - Outpatient follow up with weight loss clinic and pursue continued therapy with Ozempic; follow up in 7-10 days   - Outpatient follow up with Dr. Yoon for PH management; follow up in 5-7 days   - Follow-up with primary care physician within 1 week.   - follow up with ID within 7-10 days from discharge   - follow up in EP clinic for wound check as scheduled on 6/16/25 at 11:40 AM  - follow up with EP for ppm reimplantation eval in 7-10 days     Advanced Directives:   [x] Full code  [ ] DNR  [ ] Hospice    Discharge Diagnoses:  Left arm abscess  MRSA bacteremia  pulmonary hypertension  asthma  diarrhea

## 2025-06-11 NOTE — PROVIDER CONTACT NOTE (OTHER) - ASSESSMENT
Pt a&ox4. VSS but was hypotensive yesterday. Pt is now refusing bed alarm, stating that she doesn't need it, she feels fine, denies dizziness. Pt is standby assist OOB.
A&OX4. VS 87/64 hr 102
A&OX4. Patient has no access since shift change. IV Team came during day shift, patient unable to get a new IV. Attempted x1 by me, attempted x2 by another RN on floor, attempted x2 by KELL Lorenzo. Contacted clinical impact for IV access.
A&OX4. VS 86/62 hr 10

## 2025-06-11 NOTE — PROGRESS NOTE ADULT - NS ATTEND AMEND GEN_ALL_CORE FT
Seen and examined patient with NP/PA/ Fellow physician.  Independently verified the review of systems, physical examination, labs, radiological imaging. Also discussed with consultants to formulate eventual assessment and plan.      Assessment and plan:     Allergy consult appreciated-currently using paper tapes and tolerating better  Underwent pacemaker removal-reports continued discomfort at surgical site  No further fevers  Continues weight loss efforts. Advise reaching out to weight loss medicine office for refill of Ozempic  Pulmonary hypertension team continues to work on adjustment of PH medications    Mckay Fritz MD, MPH   Lung Transplantation  , Pulmonary and Critical care Medicine  [facility].
44 year old female with chronic hypoxic respiratory failure, group 1 PH on Trepostinil, PE (2017) on DOAC, SVT s/p ablation, HF s/p PPM, obesity, and recurrent abdominal wall abscesses presents for recurrent infection s/p I&D. Patient is improved on antibiotic therapy.     SCM decision 5/30/25 - evaluation closed due to ongoing infections and nutritional/metabolic issues.   Plan to continue follow up as outpatient with lung transplant team to monitor for recurrence of infection and monitor for weight loss.  Outpatient follow up with weight loss clinic and pursue continued therapy with Ozempic,  Outpatient follow up with Dr. Yoon for PH management
Seen and examined patient with NP/PA/ Fellow physician.  Independently verified the review of systems, physical examination, labs, radiological imaging. Also discussed with consultants to formulate eventual assessment and plan.      Assessment and plan:    Allergy consult appreciated-currently using paper tapes and tolerating better  Underwent pacemaker removal-reports discomfort at surgical site  No further fevers  Continues weight loss efforts  Pulmonary hypertension team continues to work on adjustment of PH medications    Mckay Fritz MD, MPH   Lung Transplantation  , Pulmonary and Critical care Medicine  [facility]
Seen and examined patient with NP/PA/ Fellow physician.  Independently verified the review of systems, physical exmination, labs, radiological imaging. Also discussed with consultants to formulate eventual assessment and plan.      Assessment and plan:       44-year-old female with a history of pulmonary hypertension currently admitted due to recurrent cellulitis and soft tissue infection in the arm at the site of IV infusion for pulmonary hypertension medications.  Also noted to have bacteremia  - Pulmonary hypertension team adjusting medications  - Allergy consult requested for concern of adhesive allergy possibly leading to skin breakdown and infections at the sites of use of medical tapes  - Continues working on weight loss    Mckay Fritz MD, MPH   Lung Transplantation  , Pulmonary and Critical care Medicine  [facility]
44 year old female with chronic hypoxic respiratory failure, group 1 PH on Trepostinil, PE (2017) on DOAC, SVT s/p ablation, HF s/p PPM, obesity, and recurrent abdominal wall abscesses presents for recurrent infection s/p I&D. Patient is improved on antibiotic therapy.     SCM decision 5/30/25 - evaluation closed due to ongoing infections and nutritional/metabolic issues.   Plan to continue follow up as outpatient with lung transplant team to monitor for recurrence of infection and monitor for weight loss.  [facility]patient follow [facility] weight loss clinic [facility]  Outpatient follow up with Dr. Yoon for PH management. Being considered for alternative PH regimen given issue with recurrent infections at infusion sites.

## 2025-06-11 NOTE — PROGRESS NOTE ADULT - ASSESSMENT
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 Admitted for recurrent abdominal wall abscess s/p I&D. Cultures grew MRSA and pseudomonas. Patient completed antibiotic therapy. She underwent additional work up for transplant including MYRIAM, cMRI, MRCP, and CT colonography, here now with LUE abscess.    #pre lung transplant evaluation  Barriers to transplant:  - weight loss with target BMI < 32. Ideally < 30  - nutritional/metabolic issues  - active/recurrent infections  - pt will contact weight loss clinic for ozempic (last dose 5/30/25) coverage; weekly dose    Pulm  -discuss with Pulm team if any alternative to therapy not via pump to avoid adhesive   -given skin infections/irritation pt received Allergy evaluation 6/3/25 for specific adhesive alternatives/ different options for surgical dressing for future  - pHTN therapy per pulm team      Plan for Ms Conway to follow up with our team on a monthly basis moving forward with the focus on optimizing the above concerns.

## 2025-06-11 NOTE — DISCHARGE NOTE PROVIDER - NSDCFUADDAPPT_GEN_ALL_CORE_FT
APPTS ARE READY TO BE MADE: [X] YES    Best Family or Patient Contact (if needed):    Additional Information about above appointments (if needed):    1:   2:   3:     Other comments or requests:    APPTS ARE READY TO BE MADE: [X] YES    Best Family or Patient Contact (if needed):    Additional Information about above appointments (if needed):    1: PCP   2: Pulmonology   3: Weight loss   4: ID  5: EP cardiology     Other comments or requests:    APPTS ARE READY TO BE MADE: [X] YES    Best Family or Patient Contact (if needed):    Additional Information about above appointments (if needed):    1: PCP   2: Pulmonology   3: Weight loss   4: ID  5: EP cardiology     Other comments or requests:   Prior to outreaching the patient, it was visible that the patient has secured a follow up appointment which was not scheduled by our team.  06/27 11:30a with Dr. Fritz; Pulm at [address]  06/16 11:40a with EP at [address]    Patient advised they did not want to proceed with scheduling appointments with the providers on their referrals. They will coordinate care on their own.

## 2025-06-11 NOTE — PROGRESS NOTE ADULT - PROVIDER SPECIALTY LIST ADULT
Hospitalist
Pulmonology
Pulmonology
Transplant Pulmonology
Hospitalist
Pulmonology
Pulmonology
Transplant ID
Transplant ID
Transplant Pulmonology
Transplant Pulmonology
Transplant ID
Transplant Pulmonology
Electrophysiology
Internal Medicine
Transplant ID
Transplant ID
Internal Medicine
Internal Medicine

## 2025-06-11 NOTE — DISCHARGE NOTE PROVIDER - PROVIDER TOKENS
PROVIDER:[TOKEN:[[id number]],FOLLOWUP:[1 week]],PROVIDER:[TOKEN:[[id number]]] PROVIDER:[TOKEN:[[id number]],FOLLOWUP:[1 week]],PROVIDER:[TOKEN:[[id number]]],PROVIDER:[TOKEN:[[id number]],SCHEDULEDAPPT:[06/16/2025],SCHEDULEDAPPTTIME:[11:40 AM]],PROVIDER:[TOKEN:[[id number]],SCHEDULEDAPPT:[06/17/2025],SCHEDULEDAPPTTIME:[09:40 AM]]

## 2025-06-11 NOTE — CHART NOTE - NSCHARTNOTEFT_GEN_A_CORE
Briefly, this is a 44 year old female with MRSA bacteremia secondary to LUE abscess now on daptomycin.     Patient evaluated during morning rounds. HPI and hospital course reviewed with the patient in detail. Patient reports no new complaints at this time, although she does report that she has had four episodes of watery stool daily since starting daptomycin, as well as mild abdominal pain. Agreeable to check C diff at this time and follow up on results as an outpatient.     14 point ROS negative unless otherwise stated above.     VITAL SIGNS:  T(C): 36.6 (06-11 @ 11:30), Max: 36.9 (06-11 @ 00:00)   HR: 108   BP: 113/63   RR: 18   SpO2: 98%    PHYSICAL EXAM:  CONSTITUTIONAL: non-toxic appearing, breathing comfortably on nasal canula   EYES: anicteric   HENT: oropharynx clear and moist, normocephalic  ABDOMEN: positive bowel sounds, non-tender, non-distended, no organomegaly   : no Cutler catheter in place  MUSCULOSKELETAL: moving all four extremities   NEUROLOGY: awake, alert, and oriented to self, place, date, and situation; good fund of knowledge   PSYCH: normal affect     Patient appears well enough for discharge. Patient is agreeable to discharge.     Hospital course, medication reconciliation, and follow up appointments/instructions reviewed with patient in detail. All questions answered. She agrees with the plan. Patient to update family.

## 2025-06-13 ENCOUNTER — APPOINTMENT (OUTPATIENT)
Dept: PULMONOLOGY | Facility: CLINIC | Age: 45
End: 2025-06-13
Payer: MEDICAID

## 2025-06-13 PROCEDURE — 99496 TRANSJ CARE MGMT HIGH F2F 7D: CPT | Mod: 95

## 2025-06-13 PROCEDURE — 99495 TRANSJ CARE MGMT MOD F2F 14D: CPT

## 2025-06-16 ENCOUNTER — APPOINTMENT (OUTPATIENT)
Dept: ELECTROPHYSIOLOGY | Facility: CLINIC | Age: 45
End: 2025-06-16

## 2025-06-17 ENCOUNTER — LABORATORY RESULT (OUTPATIENT)
Age: 45
End: 2025-06-17

## 2025-06-17 ENCOUNTER — APPOINTMENT (OUTPATIENT)
Dept: ELECTROPHYSIOLOGY | Facility: CLINIC | Age: 45
End: 2025-06-17

## 2025-06-18 ENCOUNTER — NON-APPOINTMENT (OUTPATIENT)
Age: 45
End: 2025-06-18

## 2025-06-26 ENCOUNTER — NON-APPOINTMENT (OUTPATIENT)
Age: 45
End: 2025-06-26

## 2025-06-26 ENCOUNTER — EMERGENCY (EMERGENCY)
Facility: HOSPITAL | Age: 45
LOS: 1 days | End: 2025-06-26
Attending: EMERGENCY MEDICINE
Payer: MEDICAID

## 2025-06-26 ENCOUNTER — OUTPATIENT (OUTPATIENT)
Dept: OUTPATIENT SERVICES | Facility: HOSPITAL | Age: 45
LOS: 1 days | Discharge: HOME | End: 2025-06-26

## 2025-06-26 VITALS
DIASTOLIC BLOOD PRESSURE: 74 MMHG | WEIGHT: 205.91 LBS | HEART RATE: 103 BPM | OXYGEN SATURATION: 99 % | SYSTOLIC BLOOD PRESSURE: 109 MMHG | TEMPERATURE: 98 F | HEIGHT: 65 IN | RESPIRATION RATE: 20 BRPM

## 2025-06-26 VITALS
TEMPERATURE: 98 F | DIASTOLIC BLOOD PRESSURE: 87 MMHG | SYSTOLIC BLOOD PRESSURE: 125 MMHG | OXYGEN SATURATION: 100 % | RESPIRATION RATE: 20 BRPM | HEART RATE: 93 BPM

## 2025-06-26 DIAGNOSIS — Z95.0 PRESENCE OF CARDIAC PACEMAKER: Chronic | ICD-10-CM

## 2025-06-26 DIAGNOSIS — D64.9 ANEMIA, UNSPECIFIED: ICD-10-CM

## 2025-06-26 LAB
ANION GAP SERPL CALC-SCNC: 13 MMOL/L — SIGNIFICANT CHANGE UP (ref 5–17)
BUN SERPL-MCNC: 8 MG/DL — SIGNIFICANT CHANGE UP (ref 7–23)
CALCIUM SERPL-MCNC: 8.9 MG/DL — SIGNIFICANT CHANGE UP (ref 8.4–10.5)
CHLORIDE SERPL-SCNC: 108 MMOL/L — SIGNIFICANT CHANGE UP (ref 96–108)
CO2 SERPL-SCNC: 14 MMOL/L — LOW (ref 22–31)
CREAT SERPL-MCNC: 0.73 MG/DL — SIGNIFICANT CHANGE UP (ref 0.5–1.3)
EGFR: 104 ML/MIN/1.73M2 — SIGNIFICANT CHANGE UP
EGFR: 104 ML/MIN/1.73M2 — SIGNIFICANT CHANGE UP
GAS PNL BLDV: SIGNIFICANT CHANGE UP
GLUCOSE SERPL-MCNC: 94 MG/DL — SIGNIFICANT CHANGE UP (ref 70–99)
POTASSIUM SERPL-MCNC: 5.1 MMOL/L — SIGNIFICANT CHANGE UP (ref 3.5–5.3)
POTASSIUM SERPL-SCNC: 5.1 MMOL/L — SIGNIFICANT CHANGE UP (ref 3.5–5.3)
SODIUM SERPL-SCNC: 135 MMOL/L — SIGNIFICANT CHANGE UP (ref 135–145)

## 2025-06-26 PROCEDURE — 82962 GLUCOSE BLOOD TEST: CPT

## 2025-06-26 PROCEDURE — 99285 EMERGENCY DEPT VISIT HI MDM: CPT

## 2025-06-26 PROCEDURE — 85018 HEMOGLOBIN: CPT

## 2025-06-26 PROCEDURE — 82947 ASSAY GLUCOSE BLOOD QUANT: CPT

## 2025-06-26 PROCEDURE — 80048 BASIC METABOLIC PNL TOTAL CA: CPT

## 2025-06-26 PROCEDURE — 93010 ELECTROCARDIOGRAM REPORT: CPT

## 2025-06-26 PROCEDURE — 85014 HEMATOCRIT: CPT

## 2025-06-26 PROCEDURE — 84132 ASSAY OF SERUM POTASSIUM: CPT

## 2025-06-26 PROCEDURE — 99283 EMERGENCY DEPT VISIT LOW MDM: CPT | Mod: 25

## 2025-06-26 PROCEDURE — 93005 ELECTROCARDIOGRAM TRACING: CPT

## 2025-06-26 PROCEDURE — 83605 ASSAY OF LACTIC ACID: CPT

## 2025-06-26 PROCEDURE — 82330 ASSAY OF CALCIUM: CPT

## 2025-06-26 PROCEDURE — 84295 ASSAY OF SERUM SODIUM: CPT

## 2025-06-26 PROCEDURE — 82435 ASSAY OF BLOOD CHLORIDE: CPT

## 2025-06-26 PROCEDURE — 82803 BLOOD GASES ANY COMBINATION: CPT

## 2025-06-26 RX ORDER — ACETAMINOPHEN 500 MG/5ML
650 LIQUID (ML) ORAL ONCE
Refills: 0 | Status: COMPLETED | OUTPATIENT
Start: 2025-06-26 | End: 2025-06-26

## 2025-06-26 RX ADMIN — Medication 650 MILLIGRAM(S): at 21:23

## 2025-06-26 NOTE — ED PROVIDER NOTE - CLINICAL SUMMARY MEDICAL DECISION MAKING FREE TEXT BOX
44 female past medical history pulmonary hypertension, CHF, on 2 L nasal cannula at home sent in for outpatient blood work showing a potassium level of 9.  Patient denies any chest pain, palpitations, shortness of breath, generalized weakness, abdominal pain, nausea, vomiting.  Vitals nonactionable.  On exam: Patient sitting comfortable in bed in no acute distress.  Heart regular rate.  Lungs with symmetric chest expansion bilaterally.  EKG not concerning for hyperkalemia.  Differential diagnosis includes but not limited to: Hyperkalemia, lab error.  Plan: Blood work.  Will reassess.

## 2025-06-26 NOTE — ED ADULT TRIAGE NOTE - CHIEF COMPLAINT QUOTE
Pt received phone call for hyperkalemia 9.3 nonhemolyzed. Pt uses 2L NC at baseline. right picc line for IV antibiotics. Pt denies CP.

## 2025-06-26 NOTE — ED PROVIDER NOTE - PATIENT PORTAL LINK FT
You can access the FollowMyHealth Patient Portal offered by [facility] by registering at the following website: [email or web address]. By joining [facility]’s FollowMyHealth portal, you will also be able to view your health information using other applications (apps) compatible with our system.

## 2025-06-26 NOTE — ED ADULT NURSE NOTE - OBJECTIVE STATEMENT
44 year old female coming to ED complaining of abnormal lab results. A&Ox4. Ambulatory independently. PMH HF, coronary HTN, baseline 2L NC. Patient has PICC line for IV antibiotics in right bicep. Patient states she had routine blood work done with her PCP who called her today and sent her in for elevated potassium, Patient denies any symptoms at this time. Pt denies chest pain, SOB, N/V/D, fever/chills, abdominal pain, HA/vision changes or GI/ symptoms.

## 2025-06-26 NOTE — ED PROVIDER NOTE - NSFOLLOWUPINSTRUCTIONS_ED_ALL_ED_FT
You have been evaluated in the Emergency Department today for outpatient lab work showing elevated potassium. Your potassium level was normal.    Please schedule an appointment with your primary care physician, within 48-72 hours if possible.    Return to the Emergency Department if you experience chest pain, palpitations, difficulty breathing, muscle weakness, nausea, vomiting, or any other concerning symptoms.    Thank you for choosing us for your care.

## 2025-06-27 ENCOUNTER — APPOINTMENT (OUTPATIENT)
Dept: HEMATOLOGY ONCOLOGY | Facility: CLINIC | Age: 45
End: 2025-06-27

## 2025-06-27 ENCOUNTER — APPOINTMENT (OUTPATIENT)
Dept: PULMONOLOGY | Facility: CLINIC | Age: 45
End: 2025-06-27
Payer: MEDICAID

## 2025-06-27 VITALS
DIASTOLIC BLOOD PRESSURE: 83 MMHG | SYSTOLIC BLOOD PRESSURE: 129 MMHG | HEART RATE: 114 BPM | RESPIRATION RATE: 17 BRPM | OXYGEN SATURATION: 95 %

## 2025-06-27 VITALS
HEART RATE: 109 BPM | BODY MASS INDEX: 33.9 KG/M2 | WEIGHT: 203.47 LBS | TEMPERATURE: 97.5 F | SYSTOLIC BLOOD PRESSURE: 116 MMHG | HEIGHT: 65 IN | DIASTOLIC BLOOD PRESSURE: 83 MMHG | RESPIRATION RATE: 16 BRPM | OXYGEN SATURATION: 97 %

## 2025-06-27 PROCEDURE — 99215 OFFICE O/P EST HI 40 MIN: CPT

## 2025-06-27 PROCEDURE — 96372 THER/PROPH/DIAG INJ SC/IM: CPT

## 2025-06-27 RX ORDER — BENRALIZUMAB 30 MG/ML
30 INJECTION, SOLUTION SUBCUTANEOUS
Refills: 0 | Status: COMPLETED | OUTPATIENT
Start: 2025-06-27

## 2025-06-27 RX ADMIN — BENRALIZUMAB 30 MG/ML: 30 INJECTION, SOLUTION SUBCUTANEOUS at 00:00

## 2025-06-30 ENCOUNTER — NON-APPOINTMENT (OUTPATIENT)
Age: 45
End: 2025-06-30

## 2025-06-30 ENCOUNTER — LABORATORY RESULT (OUTPATIENT)
Age: 45
End: 2025-06-30

## 2025-06-30 LAB
ESTIMATED AVERAGE GLUCOSE: 103 MG/DL
HBA1C MFR BLD HPLC: 5.2 %

## 2025-07-01 ENCOUNTER — TRANSCRIPTION ENCOUNTER (OUTPATIENT)
Age: 45
End: 2025-07-01

## 2025-07-02 PROBLEM — G43.909 MIGRAINE: Status: ACTIVE | Noted: 2025-07-02

## 2025-07-02 RX ORDER — ACETAMINOPHEN AND CODEINE PHOSPHATE 300; 15 MG/1; MG/1
300-15 TABLET ORAL
Qty: 7 | Refills: 0 | Status: ACTIVE | COMMUNITY
Start: 2025-07-02 | End: 1900-01-01

## 2025-07-06 ENCOUNTER — INPATIENT (INPATIENT)
Facility: HOSPITAL | Age: 45
LOS: 12 days | Discharge: HOME HEALTH | DRG: 871 | End: 2025-07-19
Attending: STUDENT IN AN ORGANIZED HEALTH CARE EDUCATION/TRAINING PROGRAM | Admitting: STUDENT IN AN ORGANIZED HEALTH CARE EDUCATION/TRAINING PROGRAM
Payer: MEDICAID

## 2025-07-06 VITALS
RESPIRATION RATE: 18 BRPM | WEIGHT: 203.05 LBS | HEART RATE: 127 BPM | OXYGEN SATURATION: 97 % | DIASTOLIC BLOOD PRESSURE: 74 MMHG | SYSTOLIC BLOOD PRESSURE: 107 MMHG | TEMPERATURE: 99 F | HEIGHT: 65 IN

## 2025-07-06 DIAGNOSIS — Z95.0 PRESENCE OF CARDIAC PACEMAKER: Chronic | ICD-10-CM

## 2025-07-06 DIAGNOSIS — T78.3XXA ANGIONEUROTIC EDEMA, INITIAL ENCOUNTER: ICD-10-CM

## 2025-07-06 LAB
ALBUMIN SERPL ELPH-MCNC: 3.6 G/DL — SIGNIFICANT CHANGE UP (ref 3.3–5)
ALP SERPL-CCNC: 70 U/L — SIGNIFICANT CHANGE UP (ref 40–120)
ALT FLD-CCNC: 11 U/L — SIGNIFICANT CHANGE UP (ref 10–45)
ANION GAP SERPL CALC-SCNC: 16 MMOL/L — SIGNIFICANT CHANGE UP (ref 5–17)
ANISOCYTOSIS BLD QL: SLIGHT — SIGNIFICANT CHANGE UP
AST SERPL-CCNC: 10 U/L — SIGNIFICANT CHANGE UP (ref 10–40)
BASOPHILS # BLD AUTO: 0.06 K/UL — SIGNIFICANT CHANGE UP (ref 0–0.2)
BASOPHILS # BLD MANUAL: 0 K/UL — SIGNIFICANT CHANGE UP (ref 0–0.2)
BASOPHILS NFR BLD AUTO: 0.4 % — SIGNIFICANT CHANGE UP (ref 0–2)
BASOPHILS NFR BLD MANUAL: 0 % — SIGNIFICANT CHANGE UP (ref 0–2)
BILIRUB SERPL-MCNC: 1.1 MG/DL — SIGNIFICANT CHANGE UP (ref 0.2–1.2)
BUN SERPL-MCNC: 6 MG/DL — LOW (ref 7–23)
BURR CELLS BLD QL SMEAR: SLIGHT — SIGNIFICANT CHANGE UP
CALCIUM SERPL-MCNC: 8.5 MG/DL — SIGNIFICANT CHANGE UP (ref 8.4–10.5)
CHLORIDE SERPL-SCNC: 102 MMOL/L — SIGNIFICANT CHANGE UP (ref 96–108)
CO2 SERPL-SCNC: 19 MMOL/L — LOW (ref 22–31)
CREAT SERPL-MCNC: 0.94 MG/DL — SIGNIFICANT CHANGE UP (ref 0.5–1.3)
DACRYOCYTES BLD QL SMEAR: SLIGHT — SIGNIFICANT CHANGE UP
EGFR: 76 ML/MIN/1.73M2 — SIGNIFICANT CHANGE UP
EGFR: 76 ML/MIN/1.73M2 — SIGNIFICANT CHANGE UP
ELLIPTOCYTES BLD QL SMEAR: SLIGHT — SIGNIFICANT CHANGE UP
EOSINOPHIL # BLD AUTO: 0 K/UL — SIGNIFICANT CHANGE UP (ref 0–0.5)
EOSINOPHIL # BLD MANUAL: 0 K/UL — SIGNIFICANT CHANGE UP (ref 0–0.5)
EOSINOPHIL NFR BLD AUTO: 0 % — SIGNIFICANT CHANGE UP (ref 0–6)
EOSINOPHIL NFR BLD MANUAL: 0 % — SIGNIFICANT CHANGE UP (ref 0–6)
GIANT PLATELETS BLD QL SMEAR: PRESENT
GLUCOSE SERPL-MCNC: 81 MG/DL — SIGNIFICANT CHANGE UP (ref 70–99)
HCG SERPL-ACNC: <2 MIU/ML — SIGNIFICANT CHANGE UP
HCT VFR BLD CALC: 40.8 % — SIGNIFICANT CHANGE UP (ref 34.5–45)
HGB BLD-MCNC: 12.6 G/DL — SIGNIFICANT CHANGE UP (ref 11.5–15.5)
IMM GRANULOCYTES # BLD AUTO: 0.06 K/UL — SIGNIFICANT CHANGE UP (ref 0–0.07)
IMM GRANULOCYTES NFR BLD AUTO: 0.4 % — SIGNIFICANT CHANGE UP (ref 0–0.9)
LYMPHOCYTES # BLD AUTO: 2.25 K/UL — SIGNIFICANT CHANGE UP (ref 1–3.3)
LYMPHOCYTES # BLD MANUAL: 2.39 K/UL — SIGNIFICANT CHANGE UP (ref 1–3.3)
LYMPHOCYTES NFR BLD AUTO: 16.8 % — SIGNIFICANT CHANGE UP (ref 13–44)
LYMPHOCYTES NFR BLD MANUAL: 17.9 % — SIGNIFICANT CHANGE UP (ref 13–44)
MACROCYTES BLD QL: SLIGHT — SIGNIFICANT CHANGE UP
MCHC RBC-ENTMCNC: 22 PG — LOW (ref 27–34)
MCHC RBC-ENTMCNC: 30.9 G/DL — LOW (ref 32–36)
MCV RBC AUTO: 71.2 FL — LOW (ref 80–100)
MICROCYTES BLD QL: SLIGHT — SIGNIFICANT CHANGE UP
MONOCYTES # BLD AUTO: 1.16 K/UL — HIGH (ref 0–0.9)
MONOCYTES # BLD MANUAL: 0.45 K/UL — SIGNIFICANT CHANGE UP (ref 0–0.9)
MONOCYTES NFR BLD AUTO: 8.7 % — SIGNIFICANT CHANGE UP (ref 2–14)
MONOCYTES NFR BLD MANUAL: 3.4 % — SIGNIFICANT CHANGE UP (ref 2–14)
NEUTROPHILS # BLD AUTO: 9.84 K/UL — HIGH (ref 1.8–7.4)
NEUTROPHILS # BLD MANUAL: 10.52 K/UL — HIGH (ref 1.8–7.4)
NEUTROPHILS NFR BLD AUTO: 73.7 % — SIGNIFICANT CHANGE UP (ref 43–77)
NEUTROPHILS NFR BLD MANUAL: 78.7 % — HIGH (ref 43–77)
NRBC # BLD AUTO: 0 K/UL — SIGNIFICANT CHANGE UP (ref 0–0)
NRBC # FLD: 0 K/UL — SIGNIFICANT CHANGE UP (ref 0–0)
NRBC BLD AUTO-RTO: 0 /100 WBCS — SIGNIFICANT CHANGE UP (ref 0–0)
OVALOCYTES BLD QL SMEAR: SLIGHT — SIGNIFICANT CHANGE UP
PLAT MORPH BLD: ABNORMAL
PLATELET # BLD AUTO: 440 K/UL — HIGH (ref 150–400)
PMV BLD: 10.5 FL — SIGNIFICANT CHANGE UP (ref 7–13)
POIKILOCYTOSIS BLD QL AUTO: SLIGHT — SIGNIFICANT CHANGE UP
POLYCHROMASIA BLD QL SMEAR: SLIGHT — SIGNIFICANT CHANGE UP
POTASSIUM SERPL-MCNC: 3.3 MMOL/L — LOW (ref 3.5–5.3)
POTASSIUM SERPL-SCNC: 3.3 MMOL/L — LOW (ref 3.5–5.3)
PROT SERPL-MCNC: 7.1 G/DL — SIGNIFICANT CHANGE UP (ref 6–8.3)
RBC # BLD: 5.73 M/UL — HIGH (ref 3.8–5.2)
RBC # FLD: 16.4 % — HIGH (ref 10.3–14.5)
RBC BLD AUTO: ABNORMAL
SODIUM SERPL-SCNC: 137 MMOL/L — SIGNIFICANT CHANGE UP (ref 135–145)
WBC # BLD: 13.37 K/UL — HIGH (ref 3.8–10.5)
WBC # FLD AUTO: 13.37 K/UL — HIGH (ref 3.8–10.5)

## 2025-07-06 PROCEDURE — 99285 EMERGENCY DEPT VISIT HI MDM: CPT

## 2025-07-06 PROCEDURE — 93010 ELECTROCARDIOGRAM REPORT: CPT

## 2025-07-06 PROCEDURE — 84702 CHORIONIC GONADOTROPIN TEST: CPT

## 2025-07-06 PROCEDURE — 36410 VNPNXR 3YR/> PHY/QHP DX/THER: CPT

## 2025-07-06 PROCEDURE — 80053 COMPREHEN METABOLIC PANEL: CPT

## 2025-07-06 PROCEDURE — 85025 COMPLETE CBC W/AUTO DIFF WBC: CPT

## 2025-07-06 PROCEDURE — 36415 COLL VENOUS BLD VENIPUNCTURE: CPT

## 2025-07-06 PROCEDURE — 99223 1ST HOSP IP/OBS HIGH 75: CPT

## 2025-07-06 RX ORDER — DIPHENHYDRAMINE HCL 12.5MG/5ML
25 ELIXIR ORAL ONCE
Refills: 0 | Status: COMPLETED | OUTPATIENT
Start: 2025-07-06 | End: 2025-07-06

## 2025-07-06 RX ORDER — ACETAMINOPHEN 500 MG/5ML
1000 LIQUID (ML) ORAL ONCE
Refills: 0 | Status: COMPLETED | OUTPATIENT
Start: 2025-07-06 | End: 2025-07-06

## 2025-07-06 RX ORDER — METHYLPREDNISOLONE ACETATE 80 MG/ML
125 INJECTION, SUSPENSION INTRA-ARTICULAR; INTRALESIONAL; INTRAMUSCULAR; SOFT TISSUE ONCE
Refills: 0 | Status: COMPLETED | OUTPATIENT
Start: 2025-07-06 | End: 2025-07-06

## 2025-07-06 RX ADMIN — Medication 1000 MILLIGRAM(S): at 23:04

## 2025-07-06 RX ADMIN — Medication 400 MILLIGRAM(S): at 22:34

## 2025-07-06 RX ADMIN — Medication 25 MILLIGRAM(S): at 17:45

## 2025-07-06 RX ADMIN — METHYLPREDNISOLONE ACETATE 125 MILLIGRAM(S): 80 INJECTION, SUSPENSION INTRA-ARTICULAR; INTRALESIONAL; INTRAMUSCULAR; SOFT TISSUE at 17:45

## 2025-07-07 DIAGNOSIS — I27.0 PRIMARY PULMONARY HYPERTENSION: ICD-10-CM

## 2025-07-07 DIAGNOSIS — T78.3XXA ANGIONEUROTIC EDEMA, INITIAL ENCOUNTER: ICD-10-CM

## 2025-07-07 DIAGNOSIS — I50.21 ACUTE SYSTOLIC (CONGESTIVE) HEART FAILURE: ICD-10-CM

## 2025-07-07 DIAGNOSIS — Z29.9 ENCOUNTER FOR PROPHYLACTIC MEASURES, UNSPECIFIED: ICD-10-CM

## 2025-07-07 DIAGNOSIS — I82.629 ACUTE EMBOLISM AND THROMBOSIS OF DEEP VEINS OF UNSPECIFIED UPPER EXTREMITY: ICD-10-CM

## 2025-07-07 DIAGNOSIS — L02.91 CUTANEOUS ABSCESS, UNSPECIFIED: ICD-10-CM

## 2025-07-07 LAB
ANION GAP SERPL CALC-SCNC: 13 MMOL/L — SIGNIFICANT CHANGE UP (ref 5–17)
BUN SERPL-MCNC: 11 MG/DL — SIGNIFICANT CHANGE UP (ref 7–23)
CALCIUM SERPL-MCNC: 8.6 MG/DL — SIGNIFICANT CHANGE UP (ref 8.4–10.5)
CHLORIDE SERPL-SCNC: 102 MMOL/L — SIGNIFICANT CHANGE UP (ref 96–108)
CO2 SERPL-SCNC: 21 MMOL/L — LOW (ref 22–31)
CREAT SERPL-MCNC: 0.81 MG/DL — SIGNIFICANT CHANGE UP (ref 0.5–1.3)
EGFR: 91 ML/MIN/1.73M2 — SIGNIFICANT CHANGE UP
EGFR: 91 ML/MIN/1.73M2 — SIGNIFICANT CHANGE UP
GLUCOSE SERPL-MCNC: 144 MG/DL — HIGH (ref 70–99)
HCT VFR BLD CALC: 39.1 % — SIGNIFICANT CHANGE UP (ref 34.5–45)
HGB BLD-MCNC: 12.1 G/DL — SIGNIFICANT CHANGE UP (ref 11.5–15.5)
MAGNESIUM SERPL-MCNC: 2 MG/DL — SIGNIFICANT CHANGE UP (ref 1.6–2.6)
MCHC RBC-ENTMCNC: 22 PG — LOW (ref 27–34)
MCHC RBC-ENTMCNC: 30.9 G/DL — LOW (ref 32–36)
MCV RBC AUTO: 71.2 FL — LOW (ref 80–100)
NRBC # BLD AUTO: 0 K/UL — SIGNIFICANT CHANGE UP (ref 0–0)
NRBC # FLD: 0 K/UL — SIGNIFICANT CHANGE UP (ref 0–0)
NRBC BLD AUTO-RTO: 0 /100 WBCS — SIGNIFICANT CHANGE UP (ref 0–0)
PHOSPHATE SERPL-MCNC: 3 MG/DL — SIGNIFICANT CHANGE UP (ref 2.5–4.5)
PLATELET # BLD AUTO: 424 K/UL — HIGH (ref 150–400)
PMV BLD: 10.2 FL — SIGNIFICANT CHANGE UP (ref 7–13)
POTASSIUM SERPL-MCNC: 4 MMOL/L — SIGNIFICANT CHANGE UP (ref 3.5–5.3)
POTASSIUM SERPL-SCNC: 4 MMOL/L — SIGNIFICANT CHANGE UP (ref 3.5–5.3)
RBC # BLD: 5.49 M/UL — HIGH (ref 3.8–5.2)
RBC # FLD: 16.1 % — HIGH (ref 10.3–14.5)
SODIUM SERPL-SCNC: 136 MMOL/L — SIGNIFICANT CHANGE UP (ref 135–145)
WBC # BLD: 9.98 K/UL — SIGNIFICANT CHANGE UP (ref 3.8–10.5)
WBC # FLD AUTO: 9.98 K/UL — SIGNIFICANT CHANGE UP (ref 3.8–10.5)

## 2025-07-07 PROCEDURE — 85025 COMPLETE CBC W/AUTO DIFF WBC: CPT

## 2025-07-07 PROCEDURE — 99223 1ST HOSP IP/OBS HIGH 75: CPT

## 2025-07-07 PROCEDURE — 99233 SBSQ HOSP IP/OBS HIGH 50: CPT

## 2025-07-07 PROCEDURE — 84100 ASSAY OF PHOSPHORUS: CPT

## 2025-07-07 PROCEDURE — 87040 BLOOD CULTURE FOR BACTERIA: CPT

## 2025-07-07 PROCEDURE — 83735 ASSAY OF MAGNESIUM: CPT

## 2025-07-07 PROCEDURE — 84702 CHORIONIC GONADOTROPIN TEST: CPT

## 2025-07-07 PROCEDURE — 80053 COMPREHEN METABOLIC PANEL: CPT

## 2025-07-07 PROCEDURE — 86160 COMPLEMENT ANTIGEN: CPT

## 2025-07-07 PROCEDURE — 94640 AIRWAY INHALATION TREATMENT: CPT

## 2025-07-07 PROCEDURE — 93971 EXTREMITY STUDY: CPT | Mod: 26,LT

## 2025-07-07 PROCEDURE — 36415 COLL VENOUS BLD VENIPUNCTURE: CPT

## 2025-07-07 PROCEDURE — 85027 COMPLETE CBC AUTOMATED: CPT

## 2025-07-07 PROCEDURE — 86161 COMPLEMENT/FUNCTION ACTIVITY: CPT

## 2025-07-07 PROCEDURE — 93971 EXTREMITY STUDY: CPT

## 2025-07-07 PROCEDURE — 80048 BASIC METABOLIC PNL TOTAL CA: CPT

## 2025-07-07 RX ORDER — DIPHENHYDRAMINE HCL 12.5MG/5ML
25 ELIXIR ORAL EVERY 4 HOURS
Refills: 0 | Status: DISCONTINUED | OUTPATIENT
Start: 2025-07-07 | End: 2025-07-08

## 2025-07-07 RX ORDER — ERTAPENEM SODIUM 1 G/1
1000 INJECTION, POWDER, LYOPHILIZED, FOR SOLUTION INTRAMUSCULAR; INTRAVENOUS EVERY 24 HOURS
Refills: 0 | Status: DISCONTINUED | OUTPATIENT
Start: 2025-07-08 | End: 2025-07-11

## 2025-07-07 RX ORDER — HYDROMORPHONE/SOD CHLOR,ISO/PF 2 MG/10 ML
1 SYRINGE (ML) INJECTION EVERY 4 HOURS
Refills: 0 | Status: DISCONTINUED | OUTPATIENT
Start: 2025-07-07 | End: 2025-07-14

## 2025-07-07 RX ORDER — DAPTOMYCIN 500 MG/10ML
284 INJECTION, POWDER, LYOPHILIZED, FOR SOLUTION INTRAVENOUS ONCE
Refills: 0 | Status: DISCONTINUED | OUTPATIENT
Start: 2025-07-07 | End: 2025-07-07

## 2025-07-07 RX ORDER — DAPTOMYCIN 500 MG/10ML
INJECTION, POWDER, LYOPHILIZED, FOR SOLUTION INTRAVENOUS
Refills: 0 | Status: DISCONTINUED | OUTPATIENT
Start: 2025-07-07 | End: 2025-07-07

## 2025-07-07 RX ORDER — ERTAPENEM SODIUM 1 G/1
INJECTION, POWDER, LYOPHILIZED, FOR SOLUTION INTRAMUSCULAR; INTRAVENOUS
Refills: 0 | Status: DISCONTINUED | OUTPATIENT
Start: 2025-07-07 | End: 2025-07-11

## 2025-07-07 RX ORDER — DAPTOMYCIN 500 MG/10ML
300 INJECTION, POWDER, LYOPHILIZED, FOR SOLUTION INTRAVENOUS ONCE
Refills: 0 | Status: COMPLETED | OUTPATIENT
Start: 2025-07-07 | End: 2025-07-07

## 2025-07-07 RX ORDER — ACETAMINOPHEN 500 MG/5ML
650 LIQUID (ML) ORAL EVERY 6 HOURS
Refills: 0 | Status: DISCONTINUED | OUTPATIENT
Start: 2025-07-07 | End: 2025-07-19

## 2025-07-07 RX ORDER — HEPARIN SODIUM 1000 [USP'U]/ML
5000 INJECTION INTRAVENOUS; SUBCUTANEOUS EVERY 8 HOURS
Refills: 0 | Status: DISCONTINUED | OUTPATIENT
Start: 2025-07-07 | End: 2025-07-14

## 2025-07-07 RX ORDER — PREDNISONE 20 MG/1
40 TABLET ORAL DAILY
Refills: 0 | Status: DISCONTINUED | OUTPATIENT
Start: 2025-07-07 | End: 2025-07-08

## 2025-07-07 RX ORDER — MELATONIN 5 MG
3 TABLET ORAL AT BEDTIME
Refills: 0 | Status: DISCONTINUED | OUTPATIENT
Start: 2025-07-07 | End: 2025-07-19

## 2025-07-07 RX ORDER — DAPTOMYCIN 500 MG/10ML
600 INJECTION, POWDER, LYOPHILIZED, FOR SOLUTION INTRAVENOUS EVERY 24 HOURS
Refills: 0 | Status: DISCONTINUED | OUTPATIENT
Start: 2025-07-08 | End: 2025-07-13

## 2025-07-07 RX ORDER — HYDROMORPHONE/SOD CHLOR,ISO/PF 2 MG/10 ML
2 SYRINGE (ML) INJECTION EVERY 4 HOURS
Refills: 0 | Status: DISCONTINUED | OUTPATIENT
Start: 2025-07-07 | End: 2025-07-14

## 2025-07-07 RX ORDER — DIPHENHYDRAMINE HCL 12.5MG/5ML
25 ELIXIR ORAL EVERY 4 HOURS
Refills: 0 | Status: DISCONTINUED | OUTPATIENT
Start: 2025-07-07 | End: 2025-07-07

## 2025-07-07 RX ORDER — ONDANSETRON HCL/PF 4 MG/2 ML
4 VIAL (ML) INJECTION EVERY 8 HOURS
Refills: 0 | Status: DISCONTINUED | OUTPATIENT
Start: 2025-07-07 | End: 2025-07-19

## 2025-07-07 RX ORDER — HYDROMORPHONE/SOD CHLOR,ISO/PF 2 MG/10 ML
0.5 SYRINGE (ML) INJECTION ONCE
Refills: 0 | Status: DISCONTINUED | OUTPATIENT
Start: 2025-07-07 | End: 2025-07-07

## 2025-07-07 RX ORDER — ERTAPENEM SODIUM 1 G/1
1000 INJECTION, POWDER, LYOPHILIZED, FOR SOLUTION INTRAMUSCULAR; INTRAVENOUS ONCE
Refills: 0 | Status: COMPLETED | OUTPATIENT
Start: 2025-07-07 | End: 2025-07-07

## 2025-07-07 RX ORDER — MONTELUKAST SODIUM 10 MG/1
10 TABLET ORAL DAILY
Refills: 0 | Status: DISCONTINUED | OUTPATIENT
Start: 2025-07-07 | End: 2025-07-19

## 2025-07-07 RX ORDER — MAGNESIUM, ALUMINUM HYDROXIDE 200-200 MG
30 TABLET,CHEWABLE ORAL EVERY 4 HOURS
Refills: 0 | Status: DISCONTINUED | OUTPATIENT
Start: 2025-07-07 | End: 2025-07-19

## 2025-07-07 RX ADMIN — Medication 2 MILLIGRAM(S): at 14:00

## 2025-07-07 RX ADMIN — ERTAPENEM SODIUM 100 MILLIGRAM(S): 1 INJECTION, POWDER, LYOPHILIZED, FOR SOLUTION INTRAMUSCULAR; INTRAVENOUS at 14:00

## 2025-07-07 RX ADMIN — Medication 1 MILLIGRAM(S): at 08:50

## 2025-07-07 RX ADMIN — Medication 2 MILLIGRAM(S): at 01:13

## 2025-07-07 RX ADMIN — Medication 2 MILLIGRAM(S): at 23:38

## 2025-07-07 RX ADMIN — Medication 25 MILLIGRAM(S): at 21:57

## 2025-07-07 RX ADMIN — PREDNISONE 40 MILLIGRAM(S): 20 TABLET ORAL at 11:34

## 2025-07-07 RX ADMIN — DAPTOMYCIN 112 MILLIGRAM(S): 500 INJECTION, POWDER, LYOPHILIZED, FOR SOLUTION INTRAVENOUS at 20:51

## 2025-07-07 RX ADMIN — Medication 20 MILLIGRAM(S): at 17:03

## 2025-07-07 RX ADMIN — Medication 2 MILLIGRAM(S): at 19:37

## 2025-07-07 RX ADMIN — DAPTOMYCIN 112 MILLIGRAM(S): 500 INJECTION, POWDER, LYOPHILIZED, FOR SOLUTION INTRAVENOUS at 17:07

## 2025-07-07 RX ADMIN — Medication 25 MILLIGRAM(S): at 05:54

## 2025-07-07 RX ADMIN — Medication 0.5 MILLIGRAM(S): at 00:31

## 2025-07-07 RX ADMIN — Medication 25 MILLIGRAM(S): at 17:02

## 2025-07-07 RX ADMIN — Medication 2 MILLIGRAM(S): at 01:43

## 2025-07-07 RX ADMIN — Medication 2 MILLIGRAM(S): at 09:01

## 2025-07-07 RX ADMIN — Medication 2 MILLIGRAM(S): at 20:00

## 2025-07-07 RX ADMIN — Medication 40 MILLIGRAM(S): at 08:06

## 2025-07-07 RX ADMIN — Medication 2 MILLIGRAM(S): at 09:30

## 2025-07-07 RX ADMIN — Medication 25 MILLIGRAM(S): at 10:17

## 2025-07-07 RX ADMIN — Medication 1 MILLIGRAM(S): at 08:06

## 2025-07-07 RX ADMIN — Medication 500 MICROGRAM(S): at 23:00

## 2025-07-07 RX ADMIN — Medication 0.5 MILLIGRAM(S): at 01:01

## 2025-07-07 RX ADMIN — MONTELUKAST SODIUM 10 MILLIGRAM(S): 10 TABLET ORAL at 08:06

## 2025-07-08 ENCOUNTER — RESULT REVIEW (OUTPATIENT)
Age: 45
End: 2025-07-08

## 2025-07-08 DIAGNOSIS — R78.81 BACTEREMIA: ICD-10-CM

## 2025-07-08 LAB — CK SERPL-CCNC: 93 U/L — SIGNIFICANT CHANGE UP (ref 25–170)

## 2025-07-08 PROCEDURE — 83520 IMMUNOASSAY QUANT NOS NONAB: CPT

## 2025-07-08 PROCEDURE — 84702 CHORIONIC GONADOTROPIN TEST: CPT

## 2025-07-08 PROCEDURE — 80053 COMPREHEN METABOLIC PANEL: CPT

## 2025-07-08 PROCEDURE — 36415 COLL VENOUS BLD VENIPUNCTURE: CPT

## 2025-07-08 PROCEDURE — 86161 COMPLEMENT/FUNCTION ACTIVITY: CPT

## 2025-07-08 PROCEDURE — 80048 BASIC METABOLIC PNL TOTAL CA: CPT

## 2025-07-08 PROCEDURE — 99233 SBSQ HOSP IP/OBS HIGH 50: CPT

## 2025-07-08 PROCEDURE — 76882 US LMTD JT/FCL EVL NVASC XTR: CPT

## 2025-07-08 PROCEDURE — 85025 COMPLETE CBC W/AUTO DIFF WBC: CPT

## 2025-07-08 PROCEDURE — 76882 US LMTD JT/FCL EVL NVASC XTR: CPT | Mod: 26,LT

## 2025-07-08 PROCEDURE — 84100 ASSAY OF PHOSPHORUS: CPT

## 2025-07-08 PROCEDURE — 93308 TTE F-UP OR LMTD: CPT | Mod: 26

## 2025-07-08 PROCEDURE — 99232 SBSQ HOSP IP/OBS MODERATE 35: CPT

## 2025-07-08 PROCEDURE — 93971 EXTREMITY STUDY: CPT

## 2025-07-08 PROCEDURE — 93321 DOPPLER ECHO F-UP/LMTD STD: CPT | Mod: 26

## 2025-07-08 PROCEDURE — 82550 ASSAY OF CK (CPK): CPT

## 2025-07-08 PROCEDURE — 83735 ASSAY OF MAGNESIUM: CPT

## 2025-07-08 PROCEDURE — 99221 1ST HOSP IP/OBS SF/LOW 40: CPT

## 2025-07-08 PROCEDURE — 93325 DOPPLER ECHO COLOR FLOW MAPG: CPT | Mod: 26

## 2025-07-08 PROCEDURE — 85027 COMPLETE CBC AUTOMATED: CPT

## 2025-07-08 PROCEDURE — 94640 AIRWAY INHALATION TREATMENT: CPT

## 2025-07-08 PROCEDURE — 87070 CULTURE OTHR SPECIMN AEROBIC: CPT

## 2025-07-08 PROCEDURE — 87040 BLOOD CULTURE FOR BACTERIA: CPT

## 2025-07-08 PROCEDURE — 86160 COMPLEMENT ANTIGEN: CPT

## 2025-07-08 PROCEDURE — 99222 1ST HOSP IP/OBS MODERATE 55: CPT

## 2025-07-08 PROCEDURE — 93005 ELECTROCARDIOGRAM TRACING: CPT

## 2025-07-08 RX ORDER — PREDNISONE 20 MG/1
20 TABLET ORAL DAILY
Refills: 0 | Status: DISCONTINUED | OUTPATIENT
Start: 2025-07-09 | End: 2025-07-17

## 2025-07-08 RX ORDER — DIPHENHYDRAMINE HCL 12.5MG/5ML
50 ELIXIR ORAL EVERY 6 HOURS
Refills: 0 | Status: DISCONTINUED | OUTPATIENT
Start: 2025-07-08 | End: 2025-07-19

## 2025-07-08 RX ORDER — LIDOCAINE HCL/PF 10 MG/ML
20 VIAL (ML) INJECTION ONCE
Refills: 0 | Status: DISCONTINUED | OUTPATIENT
Start: 2025-07-08 | End: 2025-07-14

## 2025-07-08 RX ORDER — KETOROLAC TROMETHAMINE 30 MG/ML
15 INJECTION, SOLUTION INTRAMUSCULAR; INTRAVENOUS ONCE
Refills: 0 | Status: DISCONTINUED | OUTPATIENT
Start: 2025-07-08 | End: 2025-07-08

## 2025-07-08 RX ORDER — HYDROMORPHONE/SOD CHLOR,ISO/PF 2 MG/10 ML
0.5 SYRINGE (ML) INJECTION ONCE
Refills: 0 | Status: DISCONTINUED | OUTPATIENT
Start: 2025-07-08 | End: 2025-07-08

## 2025-07-08 RX ADMIN — Medication 2 MILLIGRAM(S): at 15:21

## 2025-07-08 RX ADMIN — Medication 25 MILLIGRAM(S): at 06:07

## 2025-07-08 RX ADMIN — ERTAPENEM SODIUM 100 MILLIGRAM(S): 1 INJECTION, POWDER, LYOPHILIZED, FOR SOLUTION INTRAMUSCULAR; INTRAVENOUS at 12:49

## 2025-07-08 RX ADMIN — Medication 2 MILLIGRAM(S): at 04:39

## 2025-07-08 RX ADMIN — Medication 0.5 MILLIGRAM(S): at 01:09

## 2025-07-08 RX ADMIN — Medication 2 MILLIGRAM(S): at 17:40

## 2025-07-08 RX ADMIN — Medication 2 MILLIGRAM(S): at 23:34

## 2025-07-08 RX ADMIN — Medication 2 MILLIGRAM(S): at 22:22

## 2025-07-08 RX ADMIN — Medication 20 MILLIGRAM(S): at 17:07

## 2025-07-08 RX ADMIN — MONTELUKAST SODIUM 10 MILLIGRAM(S): 10 TABLET ORAL at 12:02

## 2025-07-08 RX ADMIN — Medication 50 MILLIGRAM(S): at 12:49

## 2025-07-08 RX ADMIN — Medication 50 MILLIGRAM(S): at 18:58

## 2025-07-08 RX ADMIN — Medication 2 MILLIGRAM(S): at 17:10

## 2025-07-08 RX ADMIN — Medication 20 MILLIGRAM(S): at 05:43

## 2025-07-08 RX ADMIN — Medication 2 MILLIGRAM(S): at 08:49

## 2025-07-08 RX ADMIN — Medication 0.5 MILLIGRAM(S): at 01:04

## 2025-07-08 RX ADMIN — KETOROLAC TROMETHAMINE 15 MILLIGRAM(S): 30 INJECTION, SOLUTION INTRAMUSCULAR; INTRAVENOUS at 07:39

## 2025-07-08 RX ADMIN — Medication 650 MILLIGRAM(S): at 05:43

## 2025-07-08 RX ADMIN — Medication 2 MILLIGRAM(S): at 03:42

## 2025-07-08 RX ADMIN — DAPTOMYCIN 124 MILLIGRAM(S): 500 INJECTION, POWDER, LYOPHILIZED, FOR SOLUTION INTRAVENOUS at 18:49

## 2025-07-08 RX ADMIN — Medication 2 MILLIGRAM(S): at 09:20

## 2025-07-08 RX ADMIN — PREDNISONE 40 MILLIGRAM(S): 20 TABLET ORAL at 05:44

## 2025-07-08 RX ADMIN — Medication 40 MILLIGRAM(S): at 05:44

## 2025-07-08 RX ADMIN — Medication 2 MILLIGRAM(S): at 00:35

## 2025-07-08 RX ADMIN — Medication 2 MILLIGRAM(S): at 14:51

## 2025-07-08 RX ADMIN — Medication 650 MILLIGRAM(S): at 06:39

## 2025-07-08 RX ADMIN — KETOROLAC TROMETHAMINE 15 MILLIGRAM(S): 30 INJECTION, SOLUTION INTRAMUSCULAR; INTRAVENOUS at 06:33

## 2025-07-09 DIAGNOSIS — I37.1 NONRHEUMATIC PULMONARY VALVE INSUFFICIENCY: ICD-10-CM

## 2025-07-09 LAB
C1INH FUNCTIONAL FLD-MCNC: 82 — SIGNIFICANT CHANGE UP
C1INH FUNCTIONAL/C1INH TOTAL MFR SERPL: 28 MG/DL — SIGNIFICANT CHANGE UP (ref 21–39)
GRAM STN FLD: ABNORMAL
SPECIMEN SOURCE: SIGNIFICANT CHANGE UP

## 2025-07-09 PROCEDURE — 76937 US GUIDE VASCULAR ACCESS: CPT | Mod: 26

## 2025-07-09 PROCEDURE — 99233 SBSQ HOSP IP/OBS HIGH 50: CPT

## 2025-07-09 PROCEDURE — 99223 1ST HOSP IP/OBS HIGH 75: CPT

## 2025-07-09 RX ORDER — BUTYROSPERMUM PARKII(SHEA BUTTER), SIMMONDSIA CHINENSIS (JOJOBA) SEED OIL, ALOE BARBADENSIS LEAF EXTRACT .01; 1; 3.5 G/100G; G/100G; G/100G
250 LIQUID TOPICAL
Refills: 0 | Status: DISCONTINUED | OUTPATIENT
Start: 2025-07-09 | End: 2025-07-19

## 2025-07-09 RX ADMIN — Medication 2 MILLIGRAM(S): at 17:42

## 2025-07-09 RX ADMIN — Medication 20 MILLIGRAM(S): at 05:23

## 2025-07-09 RX ADMIN — Medication 2 MILLIGRAM(S): at 22:24

## 2025-07-09 RX ADMIN — Medication 1 MILLIGRAM(S): at 07:40

## 2025-07-09 RX ADMIN — BUTYROSPERMUM PARKII(SHEA BUTTER), SIMMONDSIA CHINENSIS (JOJOBA) SEED OIL, ALOE BARBADENSIS LEAF EXTRACT 250 MILLIGRAM(S): .01; 1; 3.5 LIQUID TOPICAL at 17:37

## 2025-07-09 RX ADMIN — Medication 2 MILLIGRAM(S): at 09:40

## 2025-07-09 RX ADMIN — ERTAPENEM SODIUM 100 MILLIGRAM(S): 1 INJECTION, POWDER, LYOPHILIZED, FOR SOLUTION INTRAMUSCULAR; INTRAVENOUS at 12:52

## 2025-07-09 RX ADMIN — Medication 40 MILLIGRAM(S): at 05:23

## 2025-07-09 RX ADMIN — Medication 2 MILLIGRAM(S): at 21:33

## 2025-07-09 RX ADMIN — Medication 1 MILLIGRAM(S): at 07:24

## 2025-07-09 RX ADMIN — Medication 2 MILLIGRAM(S): at 07:01

## 2025-07-09 RX ADMIN — Medication 50 MILLIGRAM(S): at 15:42

## 2025-07-09 RX ADMIN — Medication 2 MILLIGRAM(S): at 05:24

## 2025-07-09 RX ADMIN — DAPTOMYCIN 124 MILLIGRAM(S): 500 INJECTION, POWDER, LYOPHILIZED, FOR SOLUTION INTRAVENOUS at 17:07

## 2025-07-09 RX ADMIN — Medication 2 MILLIGRAM(S): at 13:03

## 2025-07-09 RX ADMIN — Medication 50 MILLIGRAM(S): at 08:25

## 2025-07-09 RX ADMIN — Medication 2 MILLIGRAM(S): at 13:18

## 2025-07-09 RX ADMIN — Medication 50 MILLIGRAM(S): at 22:07

## 2025-07-09 RX ADMIN — PREDNISONE 20 MILLIGRAM(S): 20 TABLET ORAL at 05:23

## 2025-07-09 RX ADMIN — Medication 50 MILLIGRAM(S): at 00:30

## 2025-07-09 RX ADMIN — Medication 2 MILLIGRAM(S): at 09:55

## 2025-07-09 RX ADMIN — Medication 2 MILLIGRAM(S): at 18:00

## 2025-07-09 RX ADMIN — Medication 20 MILLIGRAM(S): at 17:06

## 2025-07-09 RX ADMIN — MONTELUKAST SODIUM 10 MILLIGRAM(S): 10 TABLET ORAL at 13:03

## 2025-07-10 LAB
-  AMIKACIN: SIGNIFICANT CHANGE UP
-  AZTREONAM: SIGNIFICANT CHANGE UP
-  CEFEPIME: SIGNIFICANT CHANGE UP
-  CEFTAZIDIME: SIGNIFICANT CHANGE UP
-  CIPROFLOXACIN: SIGNIFICANT CHANGE UP
-  IMIPENEM: SIGNIFICANT CHANGE UP
-  LEVOFLOXACIN: SIGNIFICANT CHANGE UP
-  MEROPENEM: SIGNIFICANT CHANGE UP
-  PIPERACILLIN/TAZOBACTAM: SIGNIFICANT CHANGE UP
ANION GAP SERPL CALC-SCNC: 11 MMOL/L — SIGNIFICANT CHANGE UP (ref 5–17)
BUN SERPL-MCNC: 11 MG/DL — SIGNIFICANT CHANGE UP (ref 7–23)
CALCIUM SERPL-MCNC: 8.3 MG/DL — LOW (ref 8.4–10.5)
CHLORIDE SERPL-SCNC: 102 MMOL/L — SIGNIFICANT CHANGE UP (ref 96–108)
CO2 SERPL-SCNC: 23 MMOL/L — SIGNIFICANT CHANGE UP (ref 22–31)
CREAT SERPL-MCNC: 0.84 MG/DL — SIGNIFICANT CHANGE UP (ref 0.5–1.3)
EGFR: 87 ML/MIN/1.73M2 — SIGNIFICANT CHANGE UP
EGFR: 87 ML/MIN/1.73M2 — SIGNIFICANT CHANGE UP
GLUCOSE SERPL-MCNC: 110 MG/DL — HIGH (ref 70–99)
HCT VFR BLD CALC: 38.9 % — SIGNIFICANT CHANGE UP (ref 34.5–45)
HGB BLD-MCNC: 11.8 G/DL — SIGNIFICANT CHANGE UP (ref 11.5–15.5)
MCHC RBC-ENTMCNC: 21.9 PG — LOW (ref 27–34)
MCHC RBC-ENTMCNC: 30.3 G/DL — LOW (ref 32–36)
MCV RBC AUTO: 72.3 FL — LOW (ref 80–100)
METHOD TYPE: SIGNIFICANT CHANGE UP
NRBC # BLD AUTO: 0 K/UL — SIGNIFICANT CHANGE UP (ref 0–0)
NRBC # FLD: 0 K/UL — SIGNIFICANT CHANGE UP (ref 0–0)
NRBC BLD AUTO-RTO: 0 /100 WBCS — SIGNIFICANT CHANGE UP (ref 0–0)
PLATELET # BLD AUTO: 381 K/UL — SIGNIFICANT CHANGE UP (ref 150–400)
PMV BLD: 10.2 FL — SIGNIFICANT CHANGE UP (ref 7–13)
POTASSIUM SERPL-MCNC: 3.8 MMOL/L — SIGNIFICANT CHANGE UP (ref 3.5–5.3)
POTASSIUM SERPL-SCNC: 3.8 MMOL/L — SIGNIFICANT CHANGE UP (ref 3.5–5.3)
RBC # BLD: 5.38 M/UL — HIGH (ref 3.8–5.2)
RBC # FLD: 16 % — HIGH (ref 10.3–14.5)
SODIUM SERPL-SCNC: 136 MMOL/L — SIGNIFICANT CHANGE UP (ref 135–145)
TRYPTASE SERPL-MCNC: 3.6 UG/L — SIGNIFICANT CHANGE UP
WBC # BLD: 10.02 K/UL — SIGNIFICANT CHANGE UP (ref 3.8–10.5)
WBC # FLD AUTO: 10.02 K/UL — SIGNIFICANT CHANGE UP (ref 3.8–10.5)

## 2025-07-10 PROCEDURE — 99233 SBSQ HOSP IP/OBS HIGH 50: CPT

## 2025-07-10 PROCEDURE — 36410 VNPNXR 3YR/> PHY/QHP DX/THER: CPT | Mod: RT

## 2025-07-10 PROCEDURE — 76937 US GUIDE VASCULAR ACCESS: CPT | Mod: 26

## 2025-07-10 PROCEDURE — 77001 FLUOROGUIDE FOR VEIN DEVICE: CPT | Mod: 26

## 2025-07-10 PROCEDURE — 99232 SBSQ HOSP IP/OBS MODERATE 35: CPT

## 2025-07-10 PROCEDURE — 99223 1ST HOSP IP/OBS HIGH 75: CPT

## 2025-07-10 PROCEDURE — 36410 VNPNXR 3YR/> PHY/QHP DX/THER: CPT

## 2025-07-10 RX ORDER — NYSTATIN 100000 [USP'U]/G
1 CREAM TOPICAL
Refills: 0 | Status: DISCONTINUED | OUTPATIENT
Start: 2025-07-10 | End: 2025-07-19

## 2025-07-10 RX ADMIN — Medication 2 MILLIGRAM(S): at 23:19

## 2025-07-10 RX ADMIN — Medication 40 MILLIGRAM(S): at 04:39

## 2025-07-10 RX ADMIN — Medication 20 MILLIGRAM(S): at 18:15

## 2025-07-10 RX ADMIN — Medication 10 MILLIGRAM(S): at 16:57

## 2025-07-10 RX ADMIN — Medication 2 MILLIGRAM(S): at 10:30

## 2025-07-10 RX ADMIN — Medication 500 MICROGRAM(S): at 04:40

## 2025-07-10 RX ADMIN — Medication 20 MILLIGRAM(S): at 04:39

## 2025-07-10 RX ADMIN — DAPTOMYCIN 124 MILLIGRAM(S): 500 INJECTION, POWDER, LYOPHILIZED, FOR SOLUTION INTRAVENOUS at 18:15

## 2025-07-10 RX ADMIN — NYSTATIN 1 APPLICATION(S): 100000 CREAM TOPICAL at 18:17

## 2025-07-10 RX ADMIN — MONTELUKAST SODIUM 10 MILLIGRAM(S): 10 TABLET ORAL at 11:50

## 2025-07-10 RX ADMIN — PREDNISONE 20 MILLIGRAM(S): 20 TABLET ORAL at 04:40

## 2025-07-10 RX ADMIN — ERTAPENEM SODIUM 100 MILLIGRAM(S): 1 INJECTION, POWDER, LYOPHILIZED, FOR SOLUTION INTRAMUSCULAR; INTRAVENOUS at 13:00

## 2025-07-10 RX ADMIN — Medication 50 MILLIGRAM(S): at 21:27

## 2025-07-10 RX ADMIN — Medication 2 MILLIGRAM(S): at 18:08

## 2025-07-10 RX ADMIN — Medication 50 MILLIGRAM(S): at 04:41

## 2025-07-10 RX ADMIN — BUTYROSPERMUM PARKII(SHEA BUTTER), SIMMONDSIA CHINENSIS (JOJOBA) SEED OIL, ALOE BARBADENSIS LEAF EXTRACT 250 MILLIGRAM(S): .01; 1; 3.5 LIQUID TOPICAL at 06:22

## 2025-07-10 RX ADMIN — Medication 2 MILLIGRAM(S): at 23:58

## 2025-07-10 RX ADMIN — BUTYROSPERMUM PARKII(SHEA BUTTER), SIMMONDSIA CHINENSIS (JOJOBA) SEED OIL, ALOE BARBADENSIS LEAF EXTRACT 250 MILLIGRAM(S): .01; 1; 3.5 LIQUID TOPICAL at 18:17

## 2025-07-10 RX ADMIN — Medication 2 MILLIGRAM(S): at 18:22

## 2025-07-10 RX ADMIN — Medication 50 MILLIGRAM(S): at 11:49

## 2025-07-10 RX ADMIN — Medication 20 MILLIEQUIVALENT(S): at 10:30

## 2025-07-10 RX ADMIN — Medication 2 MILLIGRAM(S): at 10:45

## 2025-07-10 RX ADMIN — Medication 2 MILLIGRAM(S): at 08:13

## 2025-07-10 RX ADMIN — Medication 2 MILLIGRAM(S): at 06:23

## 2025-07-11 LAB
C3 SERPL-MCNC: 105 MG/DL — SIGNIFICANT CHANGE UP (ref 81–157)
C4 SERPL-MCNC: 29 MG/DL — SIGNIFICANT CHANGE UP (ref 13–39)

## 2025-07-11 PROCEDURE — 99233 SBSQ HOSP IP/OBS HIGH 50: CPT

## 2025-07-11 RX ORDER — CEFEPIME 2 G/20ML
INJECTION, POWDER, FOR SOLUTION INTRAVENOUS
Refills: 0 | Status: DISCONTINUED | OUTPATIENT
Start: 2025-07-11 | End: 2025-07-19

## 2025-07-11 RX ORDER — CEFEPIME 2 G/20ML
2000 INJECTION, POWDER, FOR SOLUTION INTRAVENOUS EVERY 8 HOURS
Refills: 0 | Status: DISCONTINUED | OUTPATIENT
Start: 2025-07-11 | End: 2025-07-19

## 2025-07-11 RX ORDER — CEFEPIME 2 G/20ML
2000 INJECTION, POWDER, FOR SOLUTION INTRAVENOUS ONCE
Refills: 0 | Status: COMPLETED | OUTPATIENT
Start: 2025-07-11 | End: 2025-07-11

## 2025-07-11 RX ADMIN — Medication 2 MILLIGRAM(S): at 16:14

## 2025-07-11 RX ADMIN — Medication 40 MILLIGRAM(S): at 05:22

## 2025-07-11 RX ADMIN — Medication 2 MILLIGRAM(S): at 15:51

## 2025-07-11 RX ADMIN — BUTYROSPERMUM PARKII(SHEA BUTTER), SIMMONDSIA CHINENSIS (JOJOBA) SEED OIL, ALOE BARBADENSIS LEAF EXTRACT 250 MILLIGRAM(S): .01; 1; 3.5 LIQUID TOPICAL at 05:23

## 2025-07-11 RX ADMIN — Medication 2 MILLIGRAM(S): at 05:21

## 2025-07-11 RX ADMIN — NYSTATIN 1 APPLICATION(S): 100000 CREAM TOPICAL at 05:23

## 2025-07-11 RX ADMIN — Medication 1 APPLICATION(S): at 12:52

## 2025-07-11 RX ADMIN — Medication 2 MILLIGRAM(S): at 06:03

## 2025-07-11 RX ADMIN — NYSTATIN 1 APPLICATION(S): 100000 CREAM TOPICAL at 18:55

## 2025-07-11 RX ADMIN — CEFEPIME 100 MILLIGRAM(S): 2 INJECTION, POWDER, FOR SOLUTION INTRAVENOUS at 12:41

## 2025-07-11 RX ADMIN — BUTYROSPERMUM PARKII(SHEA BUTTER), SIMMONDSIA CHINENSIS (JOJOBA) SEED OIL, ALOE BARBADENSIS LEAF EXTRACT 250 MILLIGRAM(S): .01; 1; 3.5 LIQUID TOPICAL at 18:54

## 2025-07-11 RX ADMIN — Medication 2 MILLIGRAM(S): at 20:01

## 2025-07-11 RX ADMIN — Medication 20 MILLIGRAM(S): at 05:21

## 2025-07-11 RX ADMIN — Medication 2 MILLIGRAM(S): at 10:48

## 2025-07-11 RX ADMIN — CEFEPIME 100 MILLIGRAM(S): 2 INJECTION, POWDER, FOR SOLUTION INTRAVENOUS at 22:20

## 2025-07-11 RX ADMIN — Medication 2 MILLIGRAM(S): at 20:55

## 2025-07-11 RX ADMIN — Medication 50 MILLIGRAM(S): at 18:54

## 2025-07-11 RX ADMIN — Medication 2 MILLIGRAM(S): at 11:02

## 2025-07-11 RX ADMIN — Medication 20 MILLIGRAM(S): at 18:54

## 2025-07-11 RX ADMIN — PREDNISONE 20 MILLIGRAM(S): 20 TABLET ORAL at 05:22

## 2025-07-11 RX ADMIN — Medication 50 MILLIGRAM(S): at 12:42

## 2025-07-11 RX ADMIN — MONTELUKAST SODIUM 10 MILLIGRAM(S): 10 TABLET ORAL at 12:43

## 2025-07-11 RX ADMIN — Medication 50 MILLIGRAM(S): at 06:52

## 2025-07-11 RX ADMIN — Medication 10 MILLIGRAM(S): at 12:42

## 2025-07-11 RX ADMIN — DAPTOMYCIN 124 MILLIGRAM(S): 500 INJECTION, POWDER, LYOPHILIZED, FOR SOLUTION INTRAVENOUS at 18:53

## 2025-07-12 LAB
ANION GAP SERPL CALC-SCNC: 13 MMOL/L — SIGNIFICANT CHANGE UP (ref 5–17)
BUN SERPL-MCNC: 13 MG/DL — SIGNIFICANT CHANGE UP (ref 7–23)
CALCIUM SERPL-MCNC: 8.2 MG/DL — LOW (ref 8.4–10.5)
CHLORIDE SERPL-SCNC: 99 MMOL/L — SIGNIFICANT CHANGE UP (ref 96–108)
CO2 SERPL-SCNC: 25 MMOL/L — SIGNIFICANT CHANGE UP (ref 22–31)
CREAT SERPL-MCNC: 0.98 MG/DL — SIGNIFICANT CHANGE UP (ref 0.5–1.3)
CULTURE RESULTS: SIGNIFICANT CHANGE UP
CULTURE RESULTS: SIGNIFICANT CHANGE UP
EGFR: 73 ML/MIN/1.73M2 — SIGNIFICANT CHANGE UP
EGFR: 73 ML/MIN/1.73M2 — SIGNIFICANT CHANGE UP
GLUCOSE SERPL-MCNC: 111 MG/DL — HIGH (ref 70–99)
HCT VFR BLD CALC: 38.7 % — SIGNIFICANT CHANGE UP (ref 34.5–45)
HGB BLD-MCNC: 11.6 G/DL — SIGNIFICANT CHANGE UP (ref 11.5–15.5)
IGE SERPL-ACNC: 2537 KU/L — HIGH
MCHC RBC-ENTMCNC: 21.7 PG — LOW (ref 27–34)
MCHC RBC-ENTMCNC: 30 G/DL — LOW (ref 32–36)
MCV RBC AUTO: 72.3 FL — LOW (ref 80–100)
NRBC # BLD AUTO: 0 K/UL — SIGNIFICANT CHANGE UP (ref 0–0)
NRBC # FLD: 0 K/UL — SIGNIFICANT CHANGE UP (ref 0–0)
NRBC BLD AUTO-RTO: 0 /100 WBCS — SIGNIFICANT CHANGE UP (ref 0–0)
PLATELET # BLD AUTO: 410 K/UL — HIGH (ref 150–400)
PMV BLD: 10.8 FL — SIGNIFICANT CHANGE UP (ref 7–13)
POTASSIUM SERPL-MCNC: 4.6 MMOL/L — SIGNIFICANT CHANGE UP (ref 3.5–5.3)
POTASSIUM SERPL-SCNC: 4.6 MMOL/L — SIGNIFICANT CHANGE UP (ref 3.5–5.3)
PROT SERPL-MCNC: 6.1 G/DL — SIGNIFICANT CHANGE UP (ref 6–8.3)
RBC # BLD: 5.35 M/UL — HIGH (ref 3.8–5.2)
RBC # FLD: 16.6 % — HIGH (ref 10.3–14.5)
SODIUM SERPL-SCNC: 137 MMOL/L — SIGNIFICANT CHANGE UP (ref 135–145)
SPECIMEN SOURCE: SIGNIFICANT CHANGE UP
SPECIMEN SOURCE: SIGNIFICANT CHANGE UP
TRYPTASE SERPL-MCNC: 5.5 UG/L — SIGNIFICANT CHANGE UP
WBC # BLD: 12.4 K/UL — HIGH (ref 3.8–10.5)
WBC # FLD AUTO: 12.4 K/UL — HIGH (ref 3.8–10.5)

## 2025-07-12 PROCEDURE — 99233 SBSQ HOSP IP/OBS HIGH 50: CPT

## 2025-07-12 RX ADMIN — Medication 50 MILLIGRAM(S): at 01:13

## 2025-07-12 RX ADMIN — Medication 500000 UNIT(S): at 21:09

## 2025-07-12 RX ADMIN — Medication 2 MILLIGRAM(S): at 19:30

## 2025-07-12 RX ADMIN — Medication 2 MILLIGRAM(S): at 19:00

## 2025-07-12 RX ADMIN — Medication 1 APPLICATION(S): at 06:35

## 2025-07-12 RX ADMIN — Medication 2 MILLIGRAM(S): at 22:15

## 2025-07-12 RX ADMIN — Medication 50 MILLIGRAM(S): at 08:20

## 2025-07-12 RX ADMIN — Medication 500000 UNIT(S): at 14:11

## 2025-07-12 RX ADMIN — DAPTOMYCIN 124 MILLIGRAM(S): 500 INJECTION, POWDER, LYOPHILIZED, FOR SOLUTION INTRAVENOUS at 18:14

## 2025-07-12 RX ADMIN — CEFEPIME 100 MILLIGRAM(S): 2 INJECTION, POWDER, FOR SOLUTION INTRAVENOUS at 14:12

## 2025-07-12 RX ADMIN — Medication 2 MILLIGRAM(S): at 10:26

## 2025-07-12 RX ADMIN — NYSTATIN 1 APPLICATION(S): 100000 CREAM TOPICAL at 06:35

## 2025-07-12 RX ADMIN — BUTYROSPERMUM PARKII(SHEA BUTTER), SIMMONDSIA CHINENSIS (JOJOBA) SEED OIL, ALOE BARBADENSIS LEAF EXTRACT 250 MILLIGRAM(S): .01; 1; 3.5 LIQUID TOPICAL at 18:14

## 2025-07-12 RX ADMIN — Medication 20 MILLIGRAM(S): at 18:14

## 2025-07-12 RX ADMIN — MONTELUKAST SODIUM 10 MILLIGRAM(S): 10 TABLET ORAL at 12:11

## 2025-07-12 RX ADMIN — Medication 500 MICROGRAM(S): at 18:13

## 2025-07-12 RX ADMIN — Medication 2 MILLIGRAM(S): at 00:10

## 2025-07-12 RX ADMIN — Medication 2 MILLIGRAM(S): at 14:48

## 2025-07-12 RX ADMIN — Medication 2 MILLIGRAM(S): at 08:00

## 2025-07-12 RX ADMIN — PREDNISONE 20 MILLIGRAM(S): 20 TABLET ORAL at 06:34

## 2025-07-12 RX ADMIN — Medication 50 MILLIGRAM(S): at 14:52

## 2025-07-12 RX ADMIN — Medication 2 MILLIGRAM(S): at 23:00

## 2025-07-12 RX ADMIN — Medication 50 MILLIGRAM(S): at 21:06

## 2025-07-12 RX ADMIN — BUTYROSPERMUM PARKII(SHEA BUTTER), SIMMONDSIA CHINENSIS (JOJOBA) SEED OIL, ALOE BARBADENSIS LEAF EXTRACT 250 MILLIGRAM(S): .01; 1; 3.5 LIQUID TOPICAL at 06:34

## 2025-07-12 RX ADMIN — Medication 10 MILLIGRAM(S): at 12:11

## 2025-07-12 RX ADMIN — CEFEPIME 100 MILLIGRAM(S): 2 INJECTION, POWDER, FOR SOLUTION INTRAVENOUS at 06:35

## 2025-07-12 RX ADMIN — Medication 2 MILLIGRAM(S): at 01:01

## 2025-07-12 RX ADMIN — Medication 2 MILLIGRAM(S): at 06:34

## 2025-07-12 RX ADMIN — Medication 2 MILLIGRAM(S): at 09:56

## 2025-07-12 RX ADMIN — Medication 40 MILLIGRAM(S): at 06:34

## 2025-07-12 RX ADMIN — CEFEPIME 100 MILLIGRAM(S): 2 INJECTION, POWDER, FOR SOLUTION INTRAVENOUS at 21:10

## 2025-07-12 RX ADMIN — NYSTATIN 1 APPLICATION(S): 100000 CREAM TOPICAL at 18:15

## 2025-07-12 RX ADMIN — Medication 2 MILLIGRAM(S): at 14:18

## 2025-07-12 RX ADMIN — Medication 20 MILLIGRAM(S): at 06:34

## 2025-07-13 LAB
BASOPHILS # BLD AUTO: 0.06 K/UL — SIGNIFICANT CHANGE UP (ref 0–0.2)
BASOPHILS NFR BLD AUTO: 0.6 % — SIGNIFICANT CHANGE UP (ref 0–2)
CULTURE RESULTS: ABNORMAL
EOSINOPHIL # BLD AUTO: 0 K/UL — SIGNIFICANT CHANGE UP (ref 0–0.5)
EOSINOPHIL NFR BLD AUTO: 0 % — SIGNIFICANT CHANGE UP (ref 0–6)
HCT VFR BLD CALC: 37 % — SIGNIFICANT CHANGE UP (ref 34.5–45)
HGB BLD-MCNC: 11.1 G/DL — LOW (ref 11.5–15.5)
IMM GRANULOCYTES # BLD AUTO: 0.07 K/UL — SIGNIFICANT CHANGE UP (ref 0–0.07)
IMM GRANULOCYTES NFR BLD AUTO: 0.8 % — SIGNIFICANT CHANGE UP (ref 0–0.9)
LYMPHOCYTES # BLD AUTO: 2.1 K/UL — SIGNIFICANT CHANGE UP (ref 1–3.3)
LYMPHOCYTES NFR BLD AUTO: 22.5 % — SIGNIFICANT CHANGE UP (ref 13–44)
MCHC RBC-ENTMCNC: 21.9 PG — LOW (ref 27–34)
MCHC RBC-ENTMCNC: 30 G/DL — LOW (ref 32–36)
MCV RBC AUTO: 73 FL — LOW (ref 80–100)
MONOCYTES # BLD AUTO: 0.45 K/UL — SIGNIFICANT CHANGE UP (ref 0–0.9)
MONOCYTES NFR BLD AUTO: 4.8 % — SIGNIFICANT CHANGE UP (ref 2–14)
NEUTROPHILS # BLD AUTO: 6.64 K/UL — SIGNIFICANT CHANGE UP (ref 1.8–7.4)
NEUTROPHILS NFR BLD AUTO: 71.3 % — SIGNIFICANT CHANGE UP (ref 43–77)
NRBC # BLD AUTO: 0 K/UL — SIGNIFICANT CHANGE UP (ref 0–0)
NRBC # FLD: 0 K/UL — SIGNIFICANT CHANGE UP (ref 0–0)
NRBC BLD AUTO-RTO: 0 /100 WBCS — SIGNIFICANT CHANGE UP (ref 0–0)
ORGANISM # SPEC MICROSCOPIC CNT: ABNORMAL
ORGANISM # SPEC MICROSCOPIC CNT: ABNORMAL
PLATELET # BLD AUTO: 333 K/UL — SIGNIFICANT CHANGE UP (ref 150–400)
PMV BLD: 9.8 FL — SIGNIFICANT CHANGE UP (ref 7–13)
RBC # BLD: 5.07 M/UL — SIGNIFICANT CHANGE UP (ref 3.8–5.2)
RBC # FLD: 16.4 % — HIGH (ref 10.3–14.5)
SPECIMEN SOURCE: SIGNIFICANT CHANGE UP
WBC # BLD: 9.32 K/UL — SIGNIFICANT CHANGE UP (ref 3.8–10.5)
WBC # FLD AUTO: 9.32 K/UL — SIGNIFICANT CHANGE UP (ref 3.8–10.5)

## 2025-07-13 PROCEDURE — 99233 SBSQ HOSP IP/OBS HIGH 50: CPT

## 2025-07-13 RX ORDER — AMMONIUM LACTATE 12 %
1 LOTION (ML) TOPICAL
Refills: 0 | Status: DISCONTINUED | OUTPATIENT
Start: 2025-07-13 | End: 2025-07-19

## 2025-07-13 RX ADMIN — Medication 2 MILLIGRAM(S): at 07:25

## 2025-07-13 RX ADMIN — BUTYROSPERMUM PARKII(SHEA BUTTER), SIMMONDSIA CHINENSIS (JOJOBA) SEED OIL, ALOE BARBADENSIS LEAF EXTRACT 250 MILLIGRAM(S): .01; 1; 3.5 LIQUID TOPICAL at 09:18

## 2025-07-13 RX ADMIN — Medication 10 MILLIGRAM(S): at 11:42

## 2025-07-13 RX ADMIN — Medication 2 MILLIGRAM(S): at 20:37

## 2025-07-13 RX ADMIN — CEFEPIME 100 MILLIGRAM(S): 2 INJECTION, POWDER, FOR SOLUTION INTRAVENOUS at 06:54

## 2025-07-13 RX ADMIN — Medication 500000 UNIT(S): at 06:53

## 2025-07-13 RX ADMIN — Medication 2 MILLIGRAM(S): at 02:36

## 2025-07-13 RX ADMIN — Medication 500000 UNIT(S): at 21:48

## 2025-07-13 RX ADMIN — Medication 50 MILLIGRAM(S): at 09:18

## 2025-07-13 RX ADMIN — Medication 2 MILLIGRAM(S): at 11:42

## 2025-07-13 RX ADMIN — Medication 20 MILLIGRAM(S): at 06:52

## 2025-07-13 RX ADMIN — Medication 1 APPLICATION(S): at 18:07

## 2025-07-13 RX ADMIN — Medication 50 MILLIGRAM(S): at 15:32

## 2025-07-13 RX ADMIN — Medication 2 MILLIGRAM(S): at 12:12

## 2025-07-13 RX ADMIN — Medication 2 MILLIGRAM(S): at 21:37

## 2025-07-13 RX ADMIN — Medication 500000 UNIT(S): at 15:32

## 2025-07-13 RX ADMIN — CEFEPIME 100 MILLIGRAM(S): 2 INJECTION, POWDER, FOR SOLUTION INTRAVENOUS at 16:22

## 2025-07-13 RX ADMIN — Medication 2 MILLIGRAM(S): at 16:23

## 2025-07-13 RX ADMIN — Medication 50 MILLIGRAM(S): at 03:00

## 2025-07-13 RX ADMIN — PREDNISONE 20 MILLIGRAM(S): 20 TABLET ORAL at 06:52

## 2025-07-13 RX ADMIN — Medication 20 MILLIGRAM(S): at 18:07

## 2025-07-13 RX ADMIN — Medication 40 MILLIGRAM(S): at 06:52

## 2025-07-13 RX ADMIN — Medication 2 MILLIGRAM(S): at 03:10

## 2025-07-13 RX ADMIN — CEFEPIME 100 MILLIGRAM(S): 2 INJECTION, POWDER, FOR SOLUTION INTRAVENOUS at 21:46

## 2025-07-13 RX ADMIN — Medication 2 MILLIGRAM(S): at 06:55

## 2025-07-13 RX ADMIN — Medication 50 MILLIGRAM(S): at 21:41

## 2025-07-13 RX ADMIN — MONTELUKAST SODIUM 10 MILLIGRAM(S): 10 TABLET ORAL at 11:41

## 2025-07-13 RX ADMIN — Medication 2 MILLIGRAM(S): at 16:53

## 2025-07-13 RX ADMIN — BUTYROSPERMUM PARKII(SHEA BUTTER), SIMMONDSIA CHINENSIS (JOJOBA) SEED OIL, ALOE BARBADENSIS LEAF EXTRACT 250 MILLIGRAM(S): .01; 1; 3.5 LIQUID TOPICAL at 18:07

## 2025-07-14 LAB
% ALBUMIN: 54.4 % — SIGNIFICANT CHANGE UP
% ALPHA 1: 4.8 % — SIGNIFICANT CHANGE UP
% ALPHA 2: 12 % — SIGNIFICANT CHANGE UP
% BETA: 11.5 % — SIGNIFICANT CHANGE UP
% GAMMA: 17.3 % — SIGNIFICANT CHANGE UP
ALBUMIN SERPL ELPH-MCNC: 3.3 G/DL — LOW (ref 3.6–5.5)
ALBUMIN/GLOB SERPL ELPH: 1.2 RATIO — SIGNIFICANT CHANGE UP
ALPHA1 GLOB SERPL ELPH-MCNC: 0.3 G/DL — SIGNIFICANT CHANGE UP (ref 0.1–0.4)
ALPHA2 GLOB SERPL ELPH-MCNC: 0.7 G/DL — SIGNIFICANT CHANGE UP (ref 0.5–1)
ANION GAP SERPL CALC-SCNC: 10 MMOL/L — SIGNIFICANT CHANGE UP (ref 5–17)
B-GLOBULIN SERPL ELPH-MCNC: 0.7 G/DL — SIGNIFICANT CHANGE UP (ref 0.5–1)
BUN SERPL-MCNC: 13 MG/DL — SIGNIFICANT CHANGE UP (ref 7–23)
CALCIUM SERPL-MCNC: 8.3 MG/DL — LOW (ref 8.4–10.5)
CHLORIDE SERPL-SCNC: 99 MMOL/L — SIGNIFICANT CHANGE UP (ref 96–108)
CO2 SERPL-SCNC: 27 MMOL/L — SIGNIFICANT CHANGE UP (ref 22–31)
CREAT SERPL-MCNC: 0.87 MG/DL — SIGNIFICANT CHANGE UP (ref 0.5–1.3)
EGFR: 84 ML/MIN/1.73M2 — SIGNIFICANT CHANGE UP
EGFR: 84 ML/MIN/1.73M2 — SIGNIFICANT CHANGE UP
GAMMA GLOBULIN: 1.1 G/DL — SIGNIFICANT CHANGE UP (ref 0.6–1.6)
GLUCOSE SERPL-MCNC: 84 MG/DL — SIGNIFICANT CHANGE UP (ref 70–99)
HCT VFR BLD CALC: 39.6 % — SIGNIFICANT CHANGE UP (ref 34.5–45)
HGB BLD-MCNC: 12 G/DL — SIGNIFICANT CHANGE UP (ref 11.5–15.5)
MCHC RBC-ENTMCNC: 22.2 PG — LOW (ref 27–34)
MCHC RBC-ENTMCNC: 30.3 G/DL — LOW (ref 32–36)
MCV RBC AUTO: 73.3 FL — LOW (ref 80–100)
NRBC # BLD AUTO: 0 K/UL — SIGNIFICANT CHANGE UP (ref 0–0)
NRBC # FLD: 0 K/UL — SIGNIFICANT CHANGE UP (ref 0–0)
NRBC BLD AUTO-RTO: 0 /100 WBCS — SIGNIFICANT CHANGE UP (ref 0–0)
PLATELET # BLD AUTO: 360 K/UL — SIGNIFICANT CHANGE UP (ref 150–400)
PMV BLD: 10.8 FL — SIGNIFICANT CHANGE UP (ref 7–13)
POTASSIUM SERPL-MCNC: 4.6 MMOL/L — SIGNIFICANT CHANGE UP (ref 3.5–5.3)
POTASSIUM SERPL-SCNC: 4.6 MMOL/L — SIGNIFICANT CHANGE UP (ref 3.5–5.3)
PROT PATTERN SERPL ELPH-IMP: SIGNIFICANT CHANGE UP
PROT SERPL-MCNC: 6.1 G/DL — SIGNIFICANT CHANGE UP (ref 6–8.3)
RBC # BLD: 5.4 M/UL — HIGH (ref 3.8–5.2)
RBC # FLD: 16.2 % — HIGH (ref 10.3–14.5)
SODIUM SERPL-SCNC: 136 MMOL/L — SIGNIFICANT CHANGE UP (ref 135–145)
WBC # BLD: 9.39 K/UL — SIGNIFICANT CHANGE UP (ref 3.8–10.5)
WBC # FLD AUTO: 9.39 K/UL — SIGNIFICANT CHANGE UP (ref 3.8–10.5)

## 2025-07-14 PROCEDURE — 93321 DOPPLER ECHO F-UP/LMTD STD: CPT

## 2025-07-14 PROCEDURE — 71275 CT ANGIOGRAPHY CHEST: CPT | Mod: 26

## 2025-07-14 PROCEDURE — 80053 COMPREHEN METABOLIC PANEL: CPT

## 2025-07-14 PROCEDURE — 87186 SC STD MICRODIL/AGAR DIL: CPT

## 2025-07-14 PROCEDURE — 84702 CHORIONIC GONADOTROPIN TEST: CPT

## 2025-07-14 PROCEDURE — C1751: CPT

## 2025-07-14 PROCEDURE — 75574 CT ANGIO HRT W/3D IMAGE: CPT

## 2025-07-14 PROCEDURE — 84165 PROTEIN E-PHORESIS SERUM: CPT

## 2025-07-14 PROCEDURE — 94640 AIRWAY INHALATION TREATMENT: CPT

## 2025-07-14 PROCEDURE — 86160 COMPLEMENT ANTIGEN: CPT

## 2025-07-14 PROCEDURE — 71275 CT ANGIOGRAPHY CHEST: CPT

## 2025-07-14 PROCEDURE — 83735 ASSAY OF MAGNESIUM: CPT

## 2025-07-14 PROCEDURE — 84155 ASSAY OF PROTEIN SERUM: CPT

## 2025-07-14 PROCEDURE — 99233 SBSQ HOSP IP/OBS HIGH 50: CPT

## 2025-07-14 PROCEDURE — 87077 CULTURE AEROBIC IDENTIFY: CPT

## 2025-07-14 PROCEDURE — 75574 CT ANGIO HRT W/3D IMAGE: CPT | Mod: 26

## 2025-07-14 PROCEDURE — 36415 COLL VENOUS BLD VENIPUNCTURE: CPT

## 2025-07-14 PROCEDURE — 93005 ELECTROCARDIOGRAM TRACING: CPT

## 2025-07-14 PROCEDURE — 87205 SMEAR GRAM STAIN: CPT

## 2025-07-14 PROCEDURE — 93308 TTE F-UP OR LMTD: CPT

## 2025-07-14 PROCEDURE — 99233 SBSQ HOSP IP/OBS HIGH 50: CPT | Mod: GC

## 2025-07-14 PROCEDURE — 87040 BLOOD CULTURE FOR BACTERIA: CPT

## 2025-07-14 PROCEDURE — 83520 IMMUNOASSAY QUANT NOS NONAB: CPT

## 2025-07-14 PROCEDURE — 97161 PT EVAL LOW COMPLEX 20 MIN: CPT

## 2025-07-14 PROCEDURE — 85027 COMPLETE CBC AUTOMATED: CPT

## 2025-07-14 PROCEDURE — 86161 COMPLEMENT/FUNCTION ACTIVITY: CPT

## 2025-07-14 PROCEDURE — 87070 CULTURE OTHR SPECIMN AEROBIC: CPT

## 2025-07-14 PROCEDURE — 82785 ASSAY OF IGE: CPT

## 2025-07-14 PROCEDURE — 84100 ASSAY OF PHOSPHORUS: CPT

## 2025-07-14 PROCEDURE — 74174 CTA ABD&PLVS W/CONTRAST: CPT | Mod: 26

## 2025-07-14 PROCEDURE — 80048 BASIC METABOLIC PNL TOTAL CA: CPT

## 2025-07-14 PROCEDURE — 82550 ASSAY OF CK (CPK): CPT

## 2025-07-14 PROCEDURE — 74174 CTA ABD&PLVS W/CONTRAST: CPT

## 2025-07-14 PROCEDURE — 76937 US GUIDE VASCULAR ACCESS: CPT

## 2025-07-14 PROCEDURE — 76882 US LMTD JT/FCL EVL NVASC XTR: CPT

## 2025-07-14 PROCEDURE — 93325 DOPPLER ECHO COLOR FLOW MAPG: CPT

## 2025-07-14 PROCEDURE — 85025 COMPLETE CBC W/AUTO DIFF WBC: CPT

## 2025-07-14 PROCEDURE — 93971 EXTREMITY STUDY: CPT

## 2025-07-14 RX ORDER — HEPARIN SODIUM 1000 [USP'U]/ML
5000 INJECTION INTRAVENOUS; SUBCUTANEOUS EVERY 12 HOURS
Refills: 0 | Status: DISCONTINUED | OUTPATIENT
Start: 2025-07-14 | End: 2025-07-19

## 2025-07-14 RX ORDER — HYDROMORPHONE/SOD CHLOR,ISO/PF 2 MG/10 ML
1 SYRINGE (ML) INJECTION EVERY 4 HOURS
Refills: 0 | Status: DISCONTINUED | OUTPATIENT
Start: 2025-07-14 | End: 2025-07-19

## 2025-07-14 RX ORDER — LIDOCAINE HYDROCHLORIDE 20 MG/ML
1 JELLY TOPICAL ONCE
Refills: 0 | Status: COMPLETED | OUTPATIENT
Start: 2025-07-14 | End: 2025-07-14

## 2025-07-14 RX ADMIN — Medication 2 MILLIGRAM(S): at 09:54

## 2025-07-14 RX ADMIN — CEFEPIME 100 MILLIGRAM(S): 2 INJECTION, POWDER, FOR SOLUTION INTRAVENOUS at 13:47

## 2025-07-14 RX ADMIN — Medication 500 MICROGRAM(S): at 18:20

## 2025-07-14 RX ADMIN — MONTELUKAST SODIUM 10 MILLIGRAM(S): 10 TABLET ORAL at 12:09

## 2025-07-14 RX ADMIN — PREDNISONE 20 MILLIGRAM(S): 20 TABLET ORAL at 05:40

## 2025-07-14 RX ADMIN — Medication 2 MILLIGRAM(S): at 21:30

## 2025-07-14 RX ADMIN — Medication 50 MILLIGRAM(S): at 05:39

## 2025-07-14 RX ADMIN — Medication 10 MILLIGRAM(S): at 13:48

## 2025-07-14 RX ADMIN — LIDOCAINE HYDROCHLORIDE 1 PATCH: 20 JELLY TOPICAL at 19:30

## 2025-07-14 RX ADMIN — Medication 20 MILLIGRAM(S): at 18:20

## 2025-07-14 RX ADMIN — Medication 500 MICROGRAM(S): at 12:08

## 2025-07-14 RX ADMIN — BUTYROSPERMUM PARKII(SHEA BUTTER), SIMMONDSIA CHINENSIS (JOJOBA) SEED OIL, ALOE BARBADENSIS LEAF EXTRACT 250 MILLIGRAM(S): .01; 1; 3.5 LIQUID TOPICAL at 09:54

## 2025-07-14 RX ADMIN — Medication 2 MILLIGRAM(S): at 10:54

## 2025-07-14 RX ADMIN — Medication 50 MILLIGRAM(S): at 18:37

## 2025-07-14 RX ADMIN — Medication 2 MILLIGRAM(S): at 00:46

## 2025-07-14 RX ADMIN — BUTYROSPERMUM PARKII(SHEA BUTTER), SIMMONDSIA CHINENSIS (JOJOBA) SEED OIL, ALOE BARBADENSIS LEAF EXTRACT 250 MILLIGRAM(S): .01; 1; 3.5 LIQUID TOPICAL at 18:20

## 2025-07-14 RX ADMIN — Medication 500000 UNIT(S): at 05:41

## 2025-07-14 RX ADMIN — Medication 2 MILLIGRAM(S): at 20:53

## 2025-07-14 RX ADMIN — CEFEPIME 100 MILLIGRAM(S): 2 INJECTION, POWDER, FOR SOLUTION INTRAVENOUS at 23:01

## 2025-07-14 RX ADMIN — Medication 20 MILLIGRAM(S): at 05:41

## 2025-07-14 RX ADMIN — Medication 2 MILLIGRAM(S): at 14:58

## 2025-07-14 RX ADMIN — Medication 50 MILLIGRAM(S): at 12:09

## 2025-07-14 RX ADMIN — CEFEPIME 100 MILLIGRAM(S): 2 INJECTION, POWDER, FOR SOLUTION INTRAVENOUS at 05:50

## 2025-07-14 RX ADMIN — NYSTATIN 1 APPLICATION(S): 100000 CREAM TOPICAL at 18:26

## 2025-07-14 RX ADMIN — LIDOCAINE HYDROCHLORIDE 1 PATCH: 20 JELLY TOPICAL at 12:06

## 2025-07-14 RX ADMIN — Medication 2 MILLIGRAM(S): at 01:00

## 2025-07-14 RX ADMIN — Medication 2 MILLIGRAM(S): at 15:58

## 2025-07-14 RX ADMIN — Medication 2 MILLIGRAM(S): at 04:51

## 2025-07-14 RX ADMIN — Medication 40 MILLIGRAM(S): at 05:41

## 2025-07-14 RX ADMIN — Medication 500000 UNIT(S): at 13:48

## 2025-07-14 RX ADMIN — Medication 2 MILLIGRAM(S): at 05:21

## 2025-07-15 LAB
ALBUMIN SERPL ELPH-MCNC: 3.5 G/DL — SIGNIFICANT CHANGE UP (ref 3.3–5)
ALP SERPL-CCNC: 56 U/L — SIGNIFICANT CHANGE UP (ref 40–120)
ALT FLD-CCNC: 16 U/L — SIGNIFICANT CHANGE UP (ref 10–45)
ANION GAP SERPL CALC-SCNC: 10 MMOL/L — SIGNIFICANT CHANGE UP (ref 5–17)
AST SERPL-CCNC: 12 U/L — SIGNIFICANT CHANGE UP (ref 10–40)
BILIRUB SERPL-MCNC: 0.4 MG/DL — SIGNIFICANT CHANGE UP (ref 0.2–1.2)
BUN SERPL-MCNC: 14 MG/DL — SIGNIFICANT CHANGE UP (ref 7–23)
CALCIUM SERPL-MCNC: 8.4 MG/DL — SIGNIFICANT CHANGE UP (ref 8.4–10.5)
CHLORIDE SERPL-SCNC: 100 MMOL/L — SIGNIFICANT CHANGE UP (ref 96–108)
CO2 SERPL-SCNC: 26 MMOL/L — SIGNIFICANT CHANGE UP (ref 22–31)
CREAT SERPL-MCNC: 0.91 MG/DL — SIGNIFICANT CHANGE UP (ref 0.5–1.3)
EGFR: 79 ML/MIN/1.73M2 — SIGNIFICANT CHANGE UP
EGFR: 79 ML/MIN/1.73M2 — SIGNIFICANT CHANGE UP
GLUCOSE SERPL-MCNC: 90 MG/DL — SIGNIFICANT CHANGE UP (ref 70–99)
HCT VFR BLD CALC: 36.8 % — SIGNIFICANT CHANGE UP (ref 34.5–45)
HGB BLD-MCNC: 11.2 G/DL — LOW (ref 11.5–15.5)
MCHC RBC-ENTMCNC: 22 PG — LOW (ref 27–34)
MCHC RBC-ENTMCNC: 30.4 G/DL — LOW (ref 32–36)
MCV RBC AUTO: 72.4 FL — LOW (ref 80–100)
NRBC # BLD AUTO: 0 K/UL — SIGNIFICANT CHANGE UP (ref 0–0)
NRBC # FLD: 0 K/UL — SIGNIFICANT CHANGE UP (ref 0–0)
NRBC BLD AUTO-RTO: 0 /100 WBCS — SIGNIFICANT CHANGE UP (ref 0–0)
PLATELET # BLD AUTO: 351 K/UL — SIGNIFICANT CHANGE UP (ref 150–400)
PMV BLD: 10.6 FL — SIGNIFICANT CHANGE UP (ref 7–13)
POTASSIUM SERPL-MCNC: 4.3 MMOL/L — SIGNIFICANT CHANGE UP (ref 3.5–5.3)
POTASSIUM SERPL-SCNC: 4.3 MMOL/L — SIGNIFICANT CHANGE UP (ref 3.5–5.3)
PROT SERPL-MCNC: 6 G/DL — SIGNIFICANT CHANGE UP (ref 6–8.3)
RBC # BLD: 5.08 M/UL — SIGNIFICANT CHANGE UP (ref 3.8–5.2)
RBC # FLD: 16.3 % — HIGH (ref 10.3–14.5)
SODIUM SERPL-SCNC: 136 MMOL/L — SIGNIFICANT CHANGE UP (ref 135–145)
WBC # BLD: 9.78 K/UL — SIGNIFICANT CHANGE UP (ref 3.8–10.5)
WBC # FLD AUTO: 9.78 K/UL — SIGNIFICANT CHANGE UP (ref 3.8–10.5)

## 2025-07-15 PROCEDURE — 99232 SBSQ HOSP IP/OBS MODERATE 35: CPT | Mod: GC

## 2025-07-15 PROCEDURE — 99232 SBSQ HOSP IP/OBS MODERATE 35: CPT

## 2025-07-15 PROCEDURE — 99233 SBSQ HOSP IP/OBS HIGH 50: CPT

## 2025-07-15 RX ORDER — HYDROMORPHONE/SOD CHLOR,ISO/PF 2 MG/10 ML
2 SYRINGE (ML) INJECTION EVERY 4 HOURS
Refills: 0 | Status: DISCONTINUED | OUTPATIENT
Start: 2025-07-15 | End: 2025-07-19

## 2025-07-15 RX ADMIN — Medication 2 MILLIGRAM(S): at 12:53

## 2025-07-15 RX ADMIN — Medication 500 MICROGRAM(S): at 12:44

## 2025-07-15 RX ADMIN — PREDNISONE 20 MILLIGRAM(S): 20 TABLET ORAL at 06:44

## 2025-07-15 RX ADMIN — BUTYROSPERMUM PARKII(SHEA BUTTER), SIMMONDSIA CHINENSIS (JOJOBA) SEED OIL, ALOE BARBADENSIS LEAF EXTRACT 250 MILLIGRAM(S): .01; 1; 3.5 LIQUID TOPICAL at 08:26

## 2025-07-15 RX ADMIN — Medication 20 MILLIGRAM(S): at 18:08

## 2025-07-15 RX ADMIN — CEFEPIME 100 MILLIGRAM(S): 2 INJECTION, POWDER, FOR SOLUTION INTRAVENOUS at 06:45

## 2025-07-15 RX ADMIN — Medication 1 MILLIGRAM(S): at 07:13

## 2025-07-15 RX ADMIN — Medication 500000 UNIT(S): at 13:56

## 2025-07-15 RX ADMIN — Medication 1 APPLICATION(S): at 07:05

## 2025-07-15 RX ADMIN — Medication 10 MILLIGRAM(S): at 12:44

## 2025-07-15 RX ADMIN — Medication 50 MILLIGRAM(S): at 15:06

## 2025-07-15 RX ADMIN — Medication 2 MILLIGRAM(S): at 23:37

## 2025-07-15 RX ADMIN — Medication 50 MILLIGRAM(S): at 21:33

## 2025-07-15 RX ADMIN — Medication 2 MILLIGRAM(S): at 22:38

## 2025-07-15 RX ADMIN — Medication 500000 UNIT(S): at 06:44

## 2025-07-15 RX ADMIN — CEFEPIME 100 MILLIGRAM(S): 2 INJECTION, POWDER, FOR SOLUTION INTRAVENOUS at 13:55

## 2025-07-15 RX ADMIN — Medication 1 APPLICATION(S): at 06:47

## 2025-07-15 RX ADMIN — MONTELUKAST SODIUM 10 MILLIGRAM(S): 10 TABLET ORAL at 12:44

## 2025-07-15 RX ADMIN — NYSTATIN 1 APPLICATION(S): 100000 CREAM TOPICAL at 08:33

## 2025-07-15 RX ADMIN — Medication 2 MILLIGRAM(S): at 13:53

## 2025-07-15 RX ADMIN — Medication 50 MILLIGRAM(S): at 01:29

## 2025-07-15 RX ADMIN — Medication 2 MILLIGRAM(S): at 18:10

## 2025-07-15 RX ADMIN — LIDOCAINE HYDROCHLORIDE 1 PATCH: 20 JELLY TOPICAL at 00:06

## 2025-07-15 RX ADMIN — CEFEPIME 100 MILLIGRAM(S): 2 INJECTION, POWDER, FOR SOLUTION INTRAVENOUS at 21:33

## 2025-07-15 RX ADMIN — Medication 2 MILLIGRAM(S): at 19:10

## 2025-07-15 RX ADMIN — BUTYROSPERMUM PARKII(SHEA BUTTER), SIMMONDSIA CHINENSIS (JOJOBA) SEED OIL, ALOE BARBADENSIS LEAF EXTRACT 250 MILLIGRAM(S): .01; 1; 3.5 LIQUID TOPICAL at 18:09

## 2025-07-15 RX ADMIN — NYSTATIN 1 APPLICATION(S): 100000 CREAM TOPICAL at 18:13

## 2025-07-15 RX ADMIN — Medication 1 MILLIGRAM(S): at 00:50

## 2025-07-15 RX ADMIN — Medication 1 MILLIGRAM(S): at 06:43

## 2025-07-15 RX ADMIN — Medication 1 APPLICATION(S): at 18:14

## 2025-07-15 RX ADMIN — Medication 40 MILLIGRAM(S): at 06:44

## 2025-07-15 RX ADMIN — Medication 500 MICROGRAM(S): at 00:50

## 2025-07-15 RX ADMIN — Medication 1 MILLIGRAM(S): at 01:30

## 2025-07-15 RX ADMIN — Medication 50 MILLIGRAM(S): at 08:26

## 2025-07-15 RX ADMIN — Medication 20 MILLIGRAM(S): at 06:44

## 2025-07-16 LAB
ANION GAP SERPL CALC-SCNC: 10 MMOL/L — SIGNIFICANT CHANGE UP (ref 5–17)
BUN SERPL-MCNC: 11 MG/DL — SIGNIFICANT CHANGE UP (ref 7–23)
C1Q AG SERPL RAJI CELL-ACNC: 15 MG/DL — SIGNIFICANT CHANGE UP (ref 10.3–20.5)
CALCIUM SERPL-MCNC: 8.2 MG/DL — LOW (ref 8.4–10.5)
CHLORIDE SERPL-SCNC: 100 MMOL/L — SIGNIFICANT CHANGE UP (ref 96–108)
CO2 SERPL-SCNC: 26 MMOL/L — SIGNIFICANT CHANGE UP (ref 22–31)
CORTIS AM PEAK SERPL-MCNC: 2.4 UG/DL — LOW (ref 6–18.4)
CREAT SERPL-MCNC: 0.88 MG/DL — SIGNIFICANT CHANGE UP (ref 0.5–1.3)
CULTURE RESULTS: SIGNIFICANT CHANGE UP
EGFR: 83 ML/MIN/1.73M2 — SIGNIFICANT CHANGE UP
EGFR: 83 ML/MIN/1.73M2 — SIGNIFICANT CHANGE UP
GLUCOSE SERPL-MCNC: 103 MG/DL — HIGH (ref 70–99)
HCT VFR BLD CALC: 37.2 % — SIGNIFICANT CHANGE UP (ref 34.5–45)
HGB BLD-MCNC: 11.3 G/DL — LOW (ref 11.5–15.5)
HGB BLDA-MCNC: 11.6 G/DL — LOW (ref 11.7–16.1)
HGB FLD-MCNC: 11.6 G/DL — LOW (ref 11.7–16.5)
MCHC RBC-ENTMCNC: 21.9 PG — LOW (ref 27–34)
MCHC RBC-ENTMCNC: 30.4 G/DL — LOW (ref 32–36)
MCV RBC AUTO: 72 FL — LOW (ref 80–100)
NRBC # BLD AUTO: 0 K/UL — SIGNIFICANT CHANGE UP (ref 0–0)
NRBC # FLD: 0 K/UL — SIGNIFICANT CHANGE UP (ref 0–0)
NRBC BLD AUTO-RTO: 0 /100 WBCS — SIGNIFICANT CHANGE UP (ref 0–0)
OXYHGB MFR BLDA: 96.1 % — HIGH (ref 90–95)
OXYHGB MFR BLDMV: 68.9 % — SIGNIFICANT CHANGE UP
PLATELET # BLD AUTO: 337 K/UL — SIGNIFICANT CHANGE UP (ref 150–400)
PMV BLD: 10.1 FL — SIGNIFICANT CHANGE UP (ref 7–13)
POTASSIUM SERPL-MCNC: 4.1 MMOL/L — SIGNIFICANT CHANGE UP (ref 3.5–5.3)
POTASSIUM SERPL-SCNC: 4.1 MMOL/L — SIGNIFICANT CHANGE UP (ref 3.5–5.3)
RBC # BLD: 5.17 M/UL — SIGNIFICANT CHANGE UP (ref 3.8–5.2)
RBC # FLD: 16.4 % — HIGH (ref 10.3–14.5)
SAO2 % BLD: 70.8 % — SIGNIFICANT CHANGE UP (ref 60–90)
SAO2 % BLDA: 98.6 % — HIGH (ref 94–98)
SODIUM SERPL-SCNC: 136 MMOL/L — SIGNIFICANT CHANGE UP (ref 135–145)
SPECIMEN SOURCE: SIGNIFICANT CHANGE UP
T3 SERPL-MCNC: 67 NG/DL — LOW (ref 80–200)
T4 AB SER-ACNC: 6.3 UG/DL — SIGNIFICANT CHANGE UP (ref 4.6–12)
TSH SERPL-MCNC: 2.4 UIU/ML — SIGNIFICANT CHANGE UP (ref 0.27–4.2)
WBC # BLD: 9.23 K/UL — SIGNIFICANT CHANGE UP (ref 3.8–10.5)
WBC # FLD AUTO: 9.23 K/UL — SIGNIFICANT CHANGE UP (ref 3.8–10.5)

## 2025-07-16 PROCEDURE — 84155 ASSAY OF PROTEIN SERUM: CPT

## 2025-07-16 PROCEDURE — 80048 BASIC METABOLIC PNL TOTAL CA: CPT

## 2025-07-16 PROCEDURE — 93971 EXTREMITY STUDY: CPT

## 2025-07-16 PROCEDURE — 87040 BLOOD CULTURE FOR BACTERIA: CPT

## 2025-07-16 PROCEDURE — 84436 ASSAY OF TOTAL THYROXINE: CPT

## 2025-07-16 PROCEDURE — 97161 PT EVAL LOW COMPLEX 20 MIN: CPT

## 2025-07-16 PROCEDURE — 82533 TOTAL CORTISOL: CPT

## 2025-07-16 PROCEDURE — 84100 ASSAY OF PHOSPHORUS: CPT

## 2025-07-16 PROCEDURE — 99152 MOD SED SAME PHYS/QHP 5/>YRS: CPT

## 2025-07-16 PROCEDURE — 82803 BLOOD GASES ANY COMBINATION: CPT

## 2025-07-16 PROCEDURE — 80053 COMPREHEN METABOLIC PANEL: CPT

## 2025-07-16 PROCEDURE — 76882 US LMTD JT/FCL EVL NVASC XTR: CPT

## 2025-07-16 PROCEDURE — 75574 CT ANGIO HRT W/3D IMAGE: CPT

## 2025-07-16 PROCEDURE — 83735 ASSAY OF MAGNESIUM: CPT

## 2025-07-16 PROCEDURE — 87186 SC STD MICRODIL/AGAR DIL: CPT

## 2025-07-16 PROCEDURE — 93325 DOPPLER ECHO COLOR FLOW MAPG: CPT

## 2025-07-16 PROCEDURE — 82785 ASSAY OF IGE: CPT

## 2025-07-16 PROCEDURE — 84702 CHORIONIC GONADOTROPIN TEST: CPT

## 2025-07-16 PROCEDURE — 93308 TTE F-UP OR LMTD: CPT

## 2025-07-16 PROCEDURE — 93010 ELECTROCARDIOGRAM REPORT: CPT

## 2025-07-16 PROCEDURE — 99233 SBSQ HOSP IP/OBS HIGH 50: CPT | Mod: GC

## 2025-07-16 PROCEDURE — 93453 R&L HRT CATH W/VENTRICLGRPHY: CPT | Mod: 26

## 2025-07-16 PROCEDURE — 74174 CTA ABD&PLVS W/CONTRAST: CPT

## 2025-07-16 PROCEDURE — 87077 CULTURE AEROBIC IDENTIFY: CPT

## 2025-07-16 PROCEDURE — 85025 COMPLETE CBC W/AUTO DIFF WBC: CPT

## 2025-07-16 PROCEDURE — 94640 AIRWAY INHALATION TREATMENT: CPT

## 2025-07-16 PROCEDURE — 84480 ASSAY TRIIODOTHYRONINE (T3): CPT

## 2025-07-16 PROCEDURE — 93321 DOPPLER ECHO F-UP/LMTD STD: CPT

## 2025-07-16 PROCEDURE — 87205 SMEAR GRAM STAIN: CPT

## 2025-07-16 PROCEDURE — 83520 IMMUNOASSAY QUANT NOS NONAB: CPT

## 2025-07-16 PROCEDURE — C1751: CPT

## 2025-07-16 PROCEDURE — 76937 US GUIDE VASCULAR ACCESS: CPT

## 2025-07-16 PROCEDURE — 99232 SBSQ HOSP IP/OBS MODERATE 35: CPT

## 2025-07-16 PROCEDURE — 86161 COMPLEMENT/FUNCTION ACTIVITY: CPT

## 2025-07-16 PROCEDURE — 84165 PROTEIN E-PHORESIS SERUM: CPT

## 2025-07-16 PROCEDURE — 87070 CULTURE OTHR SPECIMN AEROBIC: CPT

## 2025-07-16 PROCEDURE — 86160 COMPLEMENT ANTIGEN: CPT

## 2025-07-16 PROCEDURE — 93005 ELECTROCARDIOGRAM TRACING: CPT

## 2025-07-16 PROCEDURE — 36415 COLL VENOUS BLD VENIPUNCTURE: CPT

## 2025-07-16 PROCEDURE — 82550 ASSAY OF CK (CPK): CPT

## 2025-07-16 PROCEDURE — 84443 ASSAY THYROID STIM HORMONE: CPT

## 2025-07-16 PROCEDURE — 71275 CT ANGIOGRAPHY CHEST: CPT

## 2025-07-16 PROCEDURE — 85027 COMPLETE CBC AUTOMATED: CPT

## 2025-07-16 RX ORDER — FUROSEMIDE 10 MG/ML
20 INJECTION INTRAMUSCULAR; INTRAVENOUS ONCE
Refills: 0 | Status: COMPLETED | OUTPATIENT
Start: 2025-07-16 | End: 2025-07-16

## 2025-07-16 RX ADMIN — Medication 1 APPLICATION(S): at 07:06

## 2025-07-16 RX ADMIN — Medication 500000 UNIT(S): at 06:37

## 2025-07-16 RX ADMIN — Medication 2 MILLIGRAM(S): at 22:06

## 2025-07-16 RX ADMIN — Medication 1 MILLIGRAM(S): at 19:53

## 2025-07-16 RX ADMIN — Medication 2 MILLIGRAM(S): at 05:06

## 2025-07-16 RX ADMIN — BUTYROSPERMUM PARKII(SHEA BUTTER), SIMMONDSIA CHINENSIS (JOJOBA) SEED OIL, ALOE BARBADENSIS LEAF EXTRACT 250 MILLIGRAM(S): .01; 1; 3.5 LIQUID TOPICAL at 06:37

## 2025-07-16 RX ADMIN — Medication 10 MILLIGRAM(S): at 11:19

## 2025-07-16 RX ADMIN — CEFEPIME 100 MILLIGRAM(S): 2 INJECTION, POWDER, FOR SOLUTION INTRAVENOUS at 15:11

## 2025-07-16 RX ADMIN — Medication 500 MICROGRAM(S): at 11:19

## 2025-07-16 RX ADMIN — MONTELUKAST SODIUM 10 MILLIGRAM(S): 10 TABLET ORAL at 11:19

## 2025-07-16 RX ADMIN — Medication 75 MILLILITER(S): at 12:01

## 2025-07-16 RX ADMIN — Medication 40 MILLIGRAM(S): at 06:38

## 2025-07-16 RX ADMIN — Medication 50 MILLIGRAM(S): at 06:37

## 2025-07-16 RX ADMIN — Medication 2 MILLIGRAM(S): at 09:29

## 2025-07-16 RX ADMIN — Medication 1 MILLIGRAM(S): at 19:58

## 2025-07-16 RX ADMIN — Medication 50 MILLIGRAM(S): at 15:12

## 2025-07-16 RX ADMIN — CEFEPIME 100 MILLIGRAM(S): 2 INJECTION, POWDER, FOR SOLUTION INTRAVENOUS at 21:22

## 2025-07-16 RX ADMIN — Medication 750 MILLILITER(S): at 12:01

## 2025-07-16 RX ADMIN — Medication 20 MILLIGRAM(S): at 17:38

## 2025-07-16 RX ADMIN — PREDNISONE 20 MILLIGRAM(S): 20 TABLET ORAL at 06:37

## 2025-07-16 RX ADMIN — Medication 2 MILLIGRAM(S): at 16:41

## 2025-07-16 RX ADMIN — Medication 500000 UNIT(S): at 21:22

## 2025-07-16 RX ADMIN — Medication 2 MILLIGRAM(S): at 17:11

## 2025-07-16 RX ADMIN — Medication 20 MILLIGRAM(S): at 06:38

## 2025-07-16 RX ADMIN — NYSTATIN 1 APPLICATION(S): 100000 CREAM TOPICAL at 17:40

## 2025-07-16 RX ADMIN — Medication 50 MILLIGRAM(S): at 21:22

## 2025-07-16 RX ADMIN — CEFEPIME 100 MILLIGRAM(S): 2 INJECTION, POWDER, FOR SOLUTION INTRAVENOUS at 06:37

## 2025-07-16 RX ADMIN — FUROSEMIDE 20 MILLIGRAM(S): 10 INJECTION INTRAMUSCULAR; INTRAVENOUS at 16:13

## 2025-07-16 RX ADMIN — BUTYROSPERMUM PARKII(SHEA BUTTER), SIMMONDSIA CHINENSIS (JOJOBA) SEED OIL, ALOE BARBADENSIS LEAF EXTRACT 250 MILLIGRAM(S): .01; 1; 3.5 LIQUID TOPICAL at 17:39

## 2025-07-16 RX ADMIN — Medication 2 MILLIGRAM(S): at 08:59

## 2025-07-16 RX ADMIN — Medication 1 APPLICATION(S): at 17:40

## 2025-07-16 RX ADMIN — Medication 2 MILLIGRAM(S): at 03:43

## 2025-07-17 LAB
ANION GAP SERPL CALC-SCNC: 13 MMOL/L — SIGNIFICANT CHANGE UP (ref 5–17)
BUN SERPL-MCNC: 12 MG/DL — SIGNIFICANT CHANGE UP (ref 7–23)
CALCIUM SERPL-MCNC: 8.2 MG/DL — LOW (ref 8.4–10.5)
CHLORIDE SERPL-SCNC: 97 MMOL/L — SIGNIFICANT CHANGE UP (ref 96–108)
CO2 SERPL-SCNC: 25 MMOL/L — SIGNIFICANT CHANGE UP (ref 22–31)
CREAT SERPL-MCNC: 0.85 MG/DL — SIGNIFICANT CHANGE UP (ref 0.5–1.3)
EGFR: 86 ML/MIN/1.73M2 — SIGNIFICANT CHANGE UP
EGFR: 86 ML/MIN/1.73M2 — SIGNIFICANT CHANGE UP
GLUCOSE SERPL-MCNC: 101 MG/DL — HIGH (ref 70–99)
HCT VFR BLD CALC: 38.1 % — SIGNIFICANT CHANGE UP (ref 34.5–45)
HGB BLD-MCNC: 11.5 G/DL — SIGNIFICANT CHANGE UP (ref 11.5–15.5)
MAGNESIUM SERPL-MCNC: 1.8 MG/DL — SIGNIFICANT CHANGE UP (ref 1.6–2.6)
MCHC RBC-ENTMCNC: 21.7 PG — LOW (ref 27–34)
MCHC RBC-ENTMCNC: 30.2 G/DL — LOW (ref 32–36)
MCV RBC AUTO: 72 FL — LOW (ref 80–100)
NRBC # BLD AUTO: 0 K/UL — SIGNIFICANT CHANGE UP (ref 0–0)
NRBC # FLD: 0 K/UL — SIGNIFICANT CHANGE UP (ref 0–0)
NRBC BLD AUTO-RTO: 0 /100 WBCS — SIGNIFICANT CHANGE UP (ref 0–0)
PHOSPHATE SERPL-MCNC: 4.6 MG/DL — HIGH (ref 2.5–4.5)
PLATELET # BLD AUTO: 356 K/UL — SIGNIFICANT CHANGE UP (ref 150–400)
PMV BLD: 10.5 FL — SIGNIFICANT CHANGE UP (ref 7–13)
POTASSIUM SERPL-MCNC: 4.1 MMOL/L — SIGNIFICANT CHANGE UP (ref 3.5–5.3)
POTASSIUM SERPL-SCNC: 4.1 MMOL/L — SIGNIFICANT CHANGE UP (ref 3.5–5.3)
RBC # BLD: 5.29 M/UL — HIGH (ref 3.8–5.2)
RBC # FLD: 16.6 % — HIGH (ref 10.3–14.5)
SODIUM SERPL-SCNC: 135 MMOL/L — SIGNIFICANT CHANGE UP (ref 135–145)
WBC # BLD: 11.85 K/UL — HIGH (ref 3.8–10.5)
WBC # FLD AUTO: 11.85 K/UL — HIGH (ref 3.8–10.5)

## 2025-07-17 PROCEDURE — 99233 SBSQ HOSP IP/OBS HIGH 50: CPT | Mod: GC

## 2025-07-17 PROCEDURE — 99233 SBSQ HOSP IP/OBS HIGH 50: CPT

## 2025-07-17 RX ORDER — MAGNESIUM OXIDE 400 MG
400 TABLET ORAL ONCE
Refills: 0 | Status: COMPLETED | OUTPATIENT
Start: 2025-07-17 | End: 2025-07-17

## 2025-07-17 RX ORDER — FUROSEMIDE 10 MG/ML
20 INJECTION INTRAMUSCULAR; INTRAVENOUS DAILY
Refills: 0 | Status: DISCONTINUED | OUTPATIENT
Start: 2025-07-17 | End: 2025-07-18

## 2025-07-17 RX ORDER — PREDNISONE 20 MG/1
10 TABLET ORAL DAILY
Refills: 0 | Status: DISCONTINUED | OUTPATIENT
Start: 2025-07-18 | End: 2025-07-19

## 2025-07-17 RX ADMIN — Medication 500 MICROGRAM(S): at 06:04

## 2025-07-17 RX ADMIN — NYSTATIN 1 APPLICATION(S): 100000 CREAM TOPICAL at 17:51

## 2025-07-17 RX ADMIN — FUROSEMIDE 20 MILLIGRAM(S): 10 INJECTION INTRAMUSCULAR; INTRAVENOUS at 08:38

## 2025-07-17 RX ADMIN — Medication 1 APPLICATION(S): at 06:26

## 2025-07-17 RX ADMIN — Medication 40 MILLIGRAM(S): at 06:26

## 2025-07-17 RX ADMIN — MONTELUKAST SODIUM 10 MILLIGRAM(S): 10 TABLET ORAL at 13:01

## 2025-07-17 RX ADMIN — Medication 10 MILLIGRAM(S): at 13:01

## 2025-07-17 RX ADMIN — Medication 2 MILLIGRAM(S): at 13:32

## 2025-07-17 RX ADMIN — Medication 500000 UNIT(S): at 21:57

## 2025-07-17 RX ADMIN — Medication 2 MILLIGRAM(S): at 23:38

## 2025-07-17 RX ADMIN — Medication 20 MILLIGRAM(S): at 06:04

## 2025-07-17 RX ADMIN — BUTYROSPERMUM PARKII(SHEA BUTTER), SIMMONDSIA CHINENSIS (JOJOBA) SEED OIL, ALOE BARBADENSIS LEAF EXTRACT 250 MILLIGRAM(S): .01; 1; 3.5 LIQUID TOPICAL at 06:04

## 2025-07-17 RX ADMIN — CEFEPIME 100 MILLIGRAM(S): 2 INJECTION, POWDER, FOR SOLUTION INTRAVENOUS at 14:24

## 2025-07-17 RX ADMIN — PREDNISONE 20 MILLIGRAM(S): 20 TABLET ORAL at 06:04

## 2025-07-17 RX ADMIN — Medication 500 MICROGRAM(S): at 23:40

## 2025-07-17 RX ADMIN — CEFEPIME 100 MILLIGRAM(S): 2 INJECTION, POWDER, FOR SOLUTION INTRAVENOUS at 21:58

## 2025-07-17 RX ADMIN — Medication 500000 UNIT(S): at 06:04

## 2025-07-17 RX ADMIN — Medication 2 MILLIGRAM(S): at 03:51

## 2025-07-17 RX ADMIN — Medication 50 MILLIGRAM(S): at 14:23

## 2025-07-17 RX ADMIN — Medication 2 MILLIGRAM(S): at 03:16

## 2025-07-17 RX ADMIN — CEFEPIME 100 MILLIGRAM(S): 2 INJECTION, POWDER, FOR SOLUTION INTRAVENOUS at 06:04

## 2025-07-17 RX ADMIN — Medication 2 MILLIGRAM(S): at 13:02

## 2025-07-17 RX ADMIN — Medication 2 MILLIGRAM(S): at 17:49

## 2025-07-17 RX ADMIN — Medication 2 MILLIGRAM(S): at 18:19

## 2025-07-17 RX ADMIN — Medication 20 MILLIGRAM(S): at 17:51

## 2025-07-17 RX ADMIN — Medication 2 MILLIGRAM(S): at 07:54

## 2025-07-17 RX ADMIN — Medication 50 MILLIGRAM(S): at 21:55

## 2025-07-17 RX ADMIN — Medication 2 MILLIGRAM(S): at 07:24

## 2025-07-17 RX ADMIN — Medication 1 APPLICATION(S): at 06:05

## 2025-07-17 RX ADMIN — BUTYROSPERMUM PARKII(SHEA BUTTER), SIMMONDSIA CHINENSIS (JOJOBA) SEED OIL, ALOE BARBADENSIS LEAF EXTRACT 250 MILLIGRAM(S): .01; 1; 3.5 LIQUID TOPICAL at 17:51

## 2025-07-17 RX ADMIN — Medication 50 MILLIGRAM(S): at 06:04

## 2025-07-17 RX ADMIN — Medication 2 MILLIGRAM(S): at 00:03

## 2025-07-17 RX ADMIN — Medication 400 MILLIGRAM(S): at 13:01

## 2025-07-18 LAB
ANION GAP SERPL CALC-SCNC: 16 MMOL/L — SIGNIFICANT CHANGE UP (ref 5–17)
BUN SERPL-MCNC: 13 MG/DL — SIGNIFICANT CHANGE UP (ref 7–23)
CALCIUM SERPL-MCNC: 8.2 MG/DL — LOW (ref 8.4–10.5)
CHLORIDE SERPL-SCNC: 99 MMOL/L — SIGNIFICANT CHANGE UP (ref 96–108)
CO2 SERPL-SCNC: 24 MMOL/L — SIGNIFICANT CHANGE UP (ref 22–31)
CREAT SERPL-MCNC: 0.88 MG/DL — SIGNIFICANT CHANGE UP (ref 0.5–1.3)
EGFR: 83 ML/MIN/1.73M2 — SIGNIFICANT CHANGE UP
EGFR: 83 ML/MIN/1.73M2 — SIGNIFICANT CHANGE UP
GLUCOSE SERPL-MCNC: 106 MG/DL — HIGH (ref 70–99)
HCT VFR BLD CALC: 37.9 % — SIGNIFICANT CHANGE UP (ref 34.5–45)
HGB BLD-MCNC: 11.3 G/DL — LOW (ref 11.5–15.5)
MCHC RBC-ENTMCNC: 21.5 PG — LOW (ref 27–34)
MCHC RBC-ENTMCNC: 29.8 G/DL — LOW (ref 32–36)
MCV RBC AUTO: 72.1 FL — LOW (ref 80–100)
NRBC # BLD AUTO: 0 K/UL — SIGNIFICANT CHANGE UP (ref 0–0)
NRBC # FLD: 0 K/UL — SIGNIFICANT CHANGE UP (ref 0–0)
NRBC BLD AUTO-RTO: 0 /100 WBCS — SIGNIFICANT CHANGE UP (ref 0–0)
PLATELET # BLD AUTO: 329 K/UL — SIGNIFICANT CHANGE UP (ref 150–400)
PMV BLD: 10.4 FL — SIGNIFICANT CHANGE UP (ref 7–13)
POTASSIUM SERPL-MCNC: 3.8 MMOL/L — SIGNIFICANT CHANGE UP (ref 3.5–5.3)
POTASSIUM SERPL-SCNC: 3.8 MMOL/L — SIGNIFICANT CHANGE UP (ref 3.5–5.3)
RBC # BLD: 5.26 M/UL — HIGH (ref 3.8–5.2)
RBC # FLD: 16.6 % — HIGH (ref 10.3–14.5)
SODIUM SERPL-SCNC: 139 MMOL/L — SIGNIFICANT CHANGE UP (ref 135–145)
WBC # BLD: 11.35 K/UL — HIGH (ref 3.8–10.5)
WBC # FLD AUTO: 11.35 K/UL — HIGH (ref 3.8–10.5)

## 2025-07-18 PROCEDURE — 99233 SBSQ HOSP IP/OBS HIGH 50: CPT

## 2025-07-18 PROCEDURE — 99233 SBSQ HOSP IP/OBS HIGH 50: CPT | Mod: GC

## 2025-07-18 PROCEDURE — 99232 SBSQ HOSP IP/OBS MODERATE 35: CPT

## 2025-07-18 RX ORDER — BUMETANIDE 1 MG/1
1 TABLET ORAL DAILY
Refills: 0 | Status: DISCONTINUED | OUTPATIENT
Start: 2025-07-19 | End: 2025-07-19

## 2025-07-18 RX ORDER — SIMETHICONE 80 MG
80 TABLET,CHEWABLE ORAL
Refills: 0 | Status: DISCONTINUED | OUTPATIENT
Start: 2025-07-18 | End: 2025-07-19

## 2025-07-18 RX ADMIN — Medication 50 MILLIGRAM(S): at 17:19

## 2025-07-18 RX ADMIN — Medication 2 MILLIGRAM(S): at 22:50

## 2025-07-18 RX ADMIN — Medication 2 MILLIGRAM(S): at 16:10

## 2025-07-18 RX ADMIN — Medication 40 MILLIGRAM(S): at 05:51

## 2025-07-18 RX ADMIN — Medication 500000 UNIT(S): at 05:52

## 2025-07-18 RX ADMIN — Medication 1 APPLICATION(S): at 06:02

## 2025-07-18 RX ADMIN — PREDNISONE 10 MILLIGRAM(S): 20 TABLET ORAL at 05:51

## 2025-07-18 RX ADMIN — Medication 500 MICROGRAM(S): at 05:56

## 2025-07-18 RX ADMIN — CEFEPIME 100 MILLIGRAM(S): 2 INJECTION, POWDER, FOR SOLUTION INTRAVENOUS at 05:56

## 2025-07-18 RX ADMIN — Medication 2 MILLIGRAM(S): at 22:01

## 2025-07-18 RX ADMIN — BUTYROSPERMUM PARKII(SHEA BUTTER), SIMMONDSIA CHINENSIS (JOJOBA) SEED OIL, ALOE BARBADENSIS LEAF EXTRACT 250 MILLIGRAM(S): .01; 1; 3.5 LIQUID TOPICAL at 05:50

## 2025-07-18 RX ADMIN — Medication 50 MILLIGRAM(S): at 23:27

## 2025-07-18 RX ADMIN — Medication 20 MILLIGRAM(S): at 17:20

## 2025-07-18 RX ADMIN — MONTELUKAST SODIUM 10 MILLIGRAM(S): 10 TABLET ORAL at 11:56

## 2025-07-18 RX ADMIN — Medication 2 MILLIGRAM(S): at 11:56

## 2025-07-18 RX ADMIN — Medication 2 MILLIGRAM(S): at 12:10

## 2025-07-18 RX ADMIN — Medication 10 MILLIGRAM(S): at 11:56

## 2025-07-18 RX ADMIN — CEFEPIME 100 MILLIGRAM(S): 2 INJECTION, POWDER, FOR SOLUTION INTRAVENOUS at 22:00

## 2025-07-18 RX ADMIN — Medication 2 MILLIGRAM(S): at 08:16

## 2025-07-18 RX ADMIN — BUTYROSPERMUM PARKII(SHEA BUTTER), SIMMONDSIA CHINENSIS (JOJOBA) SEED OIL, ALOE BARBADENSIS LEAF EXTRACT 250 MILLIGRAM(S): .01; 1; 3.5 LIQUID TOPICAL at 17:20

## 2025-07-18 RX ADMIN — Medication 2 MILLIGRAM(S): at 05:12

## 2025-07-18 RX ADMIN — CEFEPIME 100 MILLIGRAM(S): 2 INJECTION, POWDER, FOR SOLUTION INTRAVENOUS at 13:30

## 2025-07-18 RX ADMIN — Medication 500 MICROGRAM(S): at 23:28

## 2025-07-18 RX ADMIN — NYSTATIN 1 APPLICATION(S): 100000 CREAM TOPICAL at 17:19

## 2025-07-18 RX ADMIN — Medication 50 MILLIGRAM(S): at 05:52

## 2025-07-18 RX ADMIN — Medication 2 MILLIGRAM(S): at 00:40

## 2025-07-18 RX ADMIN — Medication 2 MILLIGRAM(S): at 08:05

## 2025-07-18 RX ADMIN — Medication 20 MILLIGRAM(S): at 05:50

## 2025-07-18 RX ADMIN — Medication 1 APPLICATION(S): at 06:01

## 2025-07-18 RX ADMIN — NYSTATIN 1 APPLICATION(S): 100000 CREAM TOPICAL at 05:59

## 2025-07-18 RX ADMIN — Medication 80 MILLIGRAM(S): at 17:19

## 2025-07-18 RX ADMIN — Medication 500 MICROGRAM(S): at 11:56

## 2025-07-18 RX ADMIN — Medication 1 APPLICATION(S): at 17:23

## 2025-07-18 RX ADMIN — FUROSEMIDE 20 MILLIGRAM(S): 10 INJECTION INTRAMUSCULAR; INTRAVENOUS at 05:54

## 2025-07-18 RX ADMIN — Medication 50 MILLIGRAM(S): at 12:08

## 2025-07-18 RX ADMIN — Medication 2 MILLIGRAM(S): at 04:07

## 2025-07-18 RX ADMIN — Medication 2 MILLIGRAM(S): at 16:07

## 2025-07-18 RX ADMIN — Medication 500000 UNIT(S): at 13:30

## 2025-07-19 ENCOUNTER — TRANSCRIPTION ENCOUNTER (OUTPATIENT)
Age: 45
End: 2025-07-19

## 2025-07-19 VITALS — HEART RATE: 85 BPM | SYSTOLIC BLOOD PRESSURE: 115 MMHG | DIASTOLIC BLOOD PRESSURE: 74 MMHG

## 2025-07-19 PROCEDURE — 93325 DOPPLER ECHO COLOR FLOW MAPG: CPT

## 2025-07-19 PROCEDURE — 94664 DEMO&/EVAL PT USE INHALER: CPT

## 2025-07-19 PROCEDURE — 80053 COMPREHEN METABOLIC PANEL: CPT

## 2025-07-19 PROCEDURE — 94640 AIRWAY INHALATION TREATMENT: CPT

## 2025-07-19 PROCEDURE — 83735 ASSAY OF MAGNESIUM: CPT

## 2025-07-19 PROCEDURE — 82785 ASSAY OF IGE: CPT

## 2025-07-19 PROCEDURE — 97161 PT EVAL LOW COMPLEX 20 MIN: CPT

## 2025-07-19 PROCEDURE — 84100 ASSAY OF PHOSPHORUS: CPT

## 2025-07-19 PROCEDURE — 87040 BLOOD CULTURE FOR BACTERIA: CPT

## 2025-07-19 PROCEDURE — 93321 DOPPLER ECHO F-UP/LMTD STD: CPT

## 2025-07-19 PROCEDURE — 82550 ASSAY OF CK (CPK): CPT

## 2025-07-19 PROCEDURE — C1751: CPT

## 2025-07-19 PROCEDURE — 87205 SMEAR GRAM STAIN: CPT

## 2025-07-19 PROCEDURE — 84480 ASSAY TRIIODOTHYRONINE (T3): CPT

## 2025-07-19 PROCEDURE — 87070 CULTURE OTHR SPECIMN AEROBIC: CPT

## 2025-07-19 PROCEDURE — C1894: CPT

## 2025-07-19 PROCEDURE — 84702 CHORIONIC GONADOTROPIN TEST: CPT

## 2025-07-19 PROCEDURE — 99285 EMERGENCY DEPT VISIT HI MDM: CPT | Mod: 25

## 2025-07-19 PROCEDURE — C1769: CPT

## 2025-07-19 PROCEDURE — 93453 R&L HRT CATH W/VENTRICLGRPHY: CPT

## 2025-07-19 PROCEDURE — 84443 ASSAY THYROID STIM HORMONE: CPT

## 2025-07-19 PROCEDURE — 80048 BASIC METABOLIC PNL TOTAL CA: CPT

## 2025-07-19 PROCEDURE — 85027 COMPLETE CBC AUTOMATED: CPT

## 2025-07-19 PROCEDURE — 71275 CT ANGIOGRAPHY CHEST: CPT

## 2025-07-19 PROCEDURE — 93308 TTE F-UP OR LMTD: CPT

## 2025-07-19 PROCEDURE — 82803 BLOOD GASES ANY COMBINATION: CPT

## 2025-07-19 PROCEDURE — 99239 HOSP IP/OBS DSCHRG MGMT >30: CPT

## 2025-07-19 PROCEDURE — 36415 COLL VENOUS BLD VENIPUNCTURE: CPT

## 2025-07-19 PROCEDURE — 83520 IMMUNOASSAY QUANT NOS NONAB: CPT

## 2025-07-19 PROCEDURE — 85025 COMPLETE CBC W/AUTO DIFF WBC: CPT

## 2025-07-19 PROCEDURE — 75574 CT ANGIO HRT W/3D IMAGE: CPT

## 2025-07-19 PROCEDURE — 87077 CULTURE AEROBIC IDENTIFY: CPT

## 2025-07-19 PROCEDURE — 87186 SC STD MICRODIL/AGAR DIL: CPT

## 2025-07-19 PROCEDURE — 93005 ELECTROCARDIOGRAM TRACING: CPT

## 2025-07-19 PROCEDURE — 86160 COMPLEMENT ANTIGEN: CPT

## 2025-07-19 PROCEDURE — 82533 TOTAL CORTISOL: CPT

## 2025-07-19 PROCEDURE — C1887: CPT

## 2025-07-19 PROCEDURE — C1889: CPT

## 2025-07-19 PROCEDURE — 76937 US GUIDE VASCULAR ACCESS: CPT

## 2025-07-19 PROCEDURE — 84165 PROTEIN E-PHORESIS SERUM: CPT

## 2025-07-19 PROCEDURE — 96374 THER/PROPH/DIAG INJ IV PUSH: CPT

## 2025-07-19 PROCEDURE — 93971 EXTREMITY STUDY: CPT

## 2025-07-19 PROCEDURE — 86161 COMPLEMENT/FUNCTION ACTIVITY: CPT

## 2025-07-19 PROCEDURE — 96375 TX/PRO/DX INJ NEW DRUG ADDON: CPT

## 2025-07-19 PROCEDURE — 84436 ASSAY OF TOTAL THYROXINE: CPT

## 2025-07-19 PROCEDURE — 76882 US LMTD JT/FCL EVL NVASC XTR: CPT

## 2025-07-19 PROCEDURE — 74174 CTA ABD&PLVS W/CONTRAST: CPT

## 2025-07-19 PROCEDURE — 84155 ASSAY OF PROTEIN SERUM: CPT

## 2025-07-19 RX ORDER — BUMETANIDE 1 MG/1
1 TABLET ORAL
Qty: 30 | Refills: 0
Start: 2025-07-19 | End: 2025-08-17

## 2025-07-19 RX ORDER — PREDNISONE 20 MG/1
2 TABLET ORAL
Qty: 20 | Refills: 0
Start: 2025-07-19 | End: 2025-07-28

## 2025-07-19 RX ADMIN — MONTELUKAST SODIUM 10 MILLIGRAM(S): 10 TABLET ORAL at 11:36

## 2025-07-19 RX ADMIN — Medication 10 MILLIGRAM(S): at 11:36

## 2025-07-19 RX ADMIN — Medication 1 APPLICATION(S): at 06:45

## 2025-07-19 RX ADMIN — Medication 500000 UNIT(S): at 06:45

## 2025-07-19 RX ADMIN — Medication 40 MILLIGRAM(S): at 06:49

## 2025-07-19 RX ADMIN — Medication 2 MILLIGRAM(S): at 08:33

## 2025-07-19 RX ADMIN — PREDNISONE 10 MILLIGRAM(S): 20 TABLET ORAL at 06:46

## 2025-07-19 RX ADMIN — CEFEPIME 100 MILLIGRAM(S): 2 INJECTION, POWDER, FOR SOLUTION INTRAVENOUS at 06:43

## 2025-07-19 RX ADMIN — Medication 1 MILLIGRAM(S): at 14:15

## 2025-07-19 RX ADMIN — Medication 2 MILLIGRAM(S): at 05:11

## 2025-07-19 RX ADMIN — Medication 20 MILLIGRAM(S): at 06:44

## 2025-07-19 RX ADMIN — Medication 50 MILLIGRAM(S): at 06:24

## 2025-07-19 RX ADMIN — Medication 500 MICROGRAM(S): at 06:50

## 2025-07-19 RX ADMIN — BUMETANIDE 1 MILLIGRAM(S): 1 TABLET ORAL at 06:46

## 2025-07-19 RX ADMIN — BUTYROSPERMUM PARKII(SHEA BUTTER), SIMMONDSIA CHINENSIS (JOJOBA) SEED OIL, ALOE BARBADENSIS LEAF EXTRACT 250 MILLIGRAM(S): .01; 1; 3.5 LIQUID TOPICAL at 06:44

## 2025-07-19 RX ADMIN — Medication 2 MILLIGRAM(S): at 11:52

## 2025-07-19 RX ADMIN — Medication 2 MILLIGRAM(S): at 11:35

## 2025-07-19 RX ADMIN — Medication 1 MILLIGRAM(S): at 14:10

## 2025-07-19 RX ADMIN — Medication 2 MILLIGRAM(S): at 07:26

## 2025-07-19 RX ADMIN — Medication 50 MILLIGRAM(S): at 10:26

## 2025-07-19 RX ADMIN — Medication 50 MILLIGRAM(S): at 15:59

## 2025-07-19 RX ADMIN — NYSTATIN 1 APPLICATION(S): 100000 CREAM TOPICAL at 06:44

## 2025-07-19 RX ADMIN — Medication 2 MILLIGRAM(S): at 03:45

## 2025-07-20 RX ORDER — PREDNISONE 20 MG/1
2 TABLET ORAL
Qty: 15 | Refills: 0
Start: 2025-07-20 | End: 2025-07-29

## 2025-07-21 ENCOUNTER — LABORATORY RESULT (OUTPATIENT)
Age: 45
End: 2025-07-21

## 2025-07-22 ENCOUNTER — APPOINTMENT (OUTPATIENT)
Dept: PULMONOLOGY | Facility: CLINIC | Age: 45
End: 2025-07-22
Payer: MEDICAID

## 2025-07-22 DIAGNOSIS — I10 ESSENTIAL (PRIMARY) HYPERTENSION: ICD-10-CM

## 2025-07-22 PROCEDURE — 99496 TRANSJ CARE MGMT HIGH F2F 7D: CPT | Mod: 95

## 2025-07-23 RX ORDER — AMLODIPINE BESYLATE 2.5 MG/1
2.5 TABLET ORAL DAILY
Qty: 30 | Refills: 2 | Status: ACTIVE | COMMUNITY
Start: 2025-07-22 | End: 1900-01-01

## 2025-07-25 ENCOUNTER — LABORATORY RESULT (OUTPATIENT)
Age: 45
End: 2025-07-25

## 2025-07-26 ENCOUNTER — NON-APPOINTMENT (OUTPATIENT)
Age: 45
End: 2025-07-26

## 2025-07-28 ENCOUNTER — NON-APPOINTMENT (OUTPATIENT)
Age: 45
End: 2025-07-28

## 2025-07-29 ENCOUNTER — APPOINTMENT (OUTPATIENT)
Dept: PULMONOLOGY | Facility: CLINIC | Age: 45
End: 2025-07-29

## 2025-07-29 ENCOUNTER — NON-APPOINTMENT (OUTPATIENT)
Age: 45
End: 2025-07-29

## 2025-07-29 ENCOUNTER — EMERGENCY (EMERGENCY)
Facility: HOSPITAL | Age: 45
LOS: 1 days | End: 2025-07-29
Attending: EMERGENCY MEDICINE
Payer: MEDICAID

## 2025-07-29 VITALS
TEMPERATURE: 98 F | HEART RATE: 111 BPM | OXYGEN SATURATION: 96 % | DIASTOLIC BLOOD PRESSURE: 64 MMHG | HEIGHT: 65 IN | RESPIRATION RATE: 18 BRPM | WEIGHT: 220.02 LBS | SYSTOLIC BLOOD PRESSURE: 117 MMHG

## 2025-07-29 DIAGNOSIS — Z95.0 PRESENCE OF CARDIAC PACEMAKER: Chronic | ICD-10-CM

## 2025-07-29 LAB
ALBUMIN SERPL ELPH-MCNC: 4.1 G/DL — SIGNIFICANT CHANGE UP (ref 3.3–5)
ALP SERPL-CCNC: 80 U/L — SIGNIFICANT CHANGE UP (ref 40–120)
ALT FLD-CCNC: 18 U/L — SIGNIFICANT CHANGE UP (ref 10–45)
ANION GAP SERPL CALC-SCNC: 14 MMOL/L — SIGNIFICANT CHANGE UP (ref 5–17)
ANISOCYTOSIS BLD QL: SLIGHT — SIGNIFICANT CHANGE UP
AST SERPL-CCNC: 47 U/L — HIGH (ref 10–40)
BASOPHILS # BLD AUTO: 0.04 K/UL — SIGNIFICANT CHANGE UP (ref 0–0.2)
BASOPHILS # BLD MANUAL: 0 K/UL — SIGNIFICANT CHANGE UP (ref 0–0.2)
BASOPHILS NFR BLD AUTO: 0.4 % — SIGNIFICANT CHANGE UP (ref 0–2)
BASOPHILS NFR BLD MANUAL: 0 % — SIGNIFICANT CHANGE UP (ref 0–2)
BILIRUB SERPL-MCNC: 0.5 MG/DL — SIGNIFICANT CHANGE UP (ref 0.2–1.2)
BUN SERPL-MCNC: 18 MG/DL — SIGNIFICANT CHANGE UP (ref 7–23)
BURR CELLS BLD QL SMEAR: SLIGHT — SIGNIFICANT CHANGE UP
CALCIUM SERPL-MCNC: 9 MG/DL — SIGNIFICANT CHANGE UP (ref 8.4–10.5)
CHLORIDE SERPL-SCNC: 107 MMOL/L — SIGNIFICANT CHANGE UP (ref 96–108)
CO2 SERPL-SCNC: 16 MMOL/L — LOW (ref 22–31)
CREAT SERPL-MCNC: 0.75 MG/DL — SIGNIFICANT CHANGE UP (ref 0.5–1.3)
CRP SERPL-MCNC: 5 MG/L — HIGH (ref 0–4)
DACRYOCYTES BLD QL SMEAR: SLIGHT — SIGNIFICANT CHANGE UP
EGFR: 100 ML/MIN/1.73M2 — SIGNIFICANT CHANGE UP
EGFR: 100 ML/MIN/1.73M2 — SIGNIFICANT CHANGE UP
EOSINOPHIL # BLD AUTO: 0 K/UL — SIGNIFICANT CHANGE UP (ref 0–0.5)
EOSINOPHIL # BLD MANUAL: 0 K/UL — SIGNIFICANT CHANGE UP (ref 0–0.5)
EOSINOPHIL NFR BLD AUTO: 0 % — SIGNIFICANT CHANGE UP (ref 0–6)
EOSINOPHIL NFR BLD MANUAL: 0 % — SIGNIFICANT CHANGE UP (ref 0–6)
GIANT PLATELETS BLD QL SMEAR: PRESENT
GLUCOSE SERPL-MCNC: 92 MG/DL — SIGNIFICANT CHANGE UP (ref 70–99)
HCG SERPL-ACNC: <2 MIU/ML — SIGNIFICANT CHANGE UP
HCT VFR BLD CALC: 42 % — SIGNIFICANT CHANGE UP (ref 34.5–45)
HGB BLD-MCNC: 12.6 G/DL — SIGNIFICANT CHANGE UP (ref 11.5–15.5)
IMM GRANULOCYTES # BLD AUTO: 0.03 K/UL — SIGNIFICANT CHANGE UP (ref 0–0.07)
IMM GRANULOCYTES NFR BLD AUTO: 0.3 % — SIGNIFICANT CHANGE UP (ref 0–0.9)
LYMPHOCYTES # BLD AUTO: 1.78 K/UL — SIGNIFICANT CHANGE UP (ref 1–3.3)
LYMPHOCYTES # BLD MANUAL: 1.68 K/UL — SIGNIFICANT CHANGE UP (ref 1–3.3)
LYMPHOCYTES NFR BLD AUTO: 20 % — SIGNIFICANT CHANGE UP (ref 13–44)
LYMPHOCYTES NFR BLD MANUAL: 18.8 % — SIGNIFICANT CHANGE UP (ref 13–44)
MACROCYTES BLD QL: SLIGHT — SIGNIFICANT CHANGE UP
MCHC RBC-ENTMCNC: 21.5 PG — LOW (ref 27–34)
MCHC RBC-ENTMCNC: 30 G/DL — LOW (ref 32–36)
MCV RBC AUTO: 71.7 FL — LOW (ref 80–100)
MICROCYTES BLD QL: SLIGHT — SIGNIFICANT CHANGE UP
MONOCYTES # BLD AUTO: 0.61 K/UL — SIGNIFICANT CHANGE UP (ref 0–0.9)
MONOCYTES # BLD MANUAL: 0.3 K/UL — SIGNIFICANT CHANGE UP (ref 0–0.9)
MONOCYTES NFR BLD AUTO: 6.8 % — SIGNIFICANT CHANGE UP (ref 2–14)
MONOCYTES NFR BLD MANUAL: 3.4 % — SIGNIFICANT CHANGE UP (ref 2–14)
NEUTROPHILS # BLD AUTO: 6.46 K/UL — SIGNIFICANT CHANGE UP (ref 1.8–7.4)
NEUTROPHILS # BLD MANUAL: 6.94 K/UL — SIGNIFICANT CHANGE UP (ref 1.8–7.4)
NEUTROPHILS NFR BLD AUTO: 72.5 % — SIGNIFICANT CHANGE UP (ref 43–77)
NEUTROPHILS NFR BLD MANUAL: 77.8 % — HIGH (ref 43–77)
NRBC # BLD AUTO: 0 K/UL — SIGNIFICANT CHANGE UP (ref 0–0)
NRBC # FLD: 0 K/UL — SIGNIFICANT CHANGE UP (ref 0–0)
NRBC BLD AUTO-RTO: 0 /100 WBCS — SIGNIFICANT CHANGE UP (ref 0–0)
OVALOCYTES BLD QL SMEAR: SLIGHT — SIGNIFICANT CHANGE UP
PLAT MORPH BLD: ABNORMAL
PLATELET # BLD AUTO: 429 K/UL — HIGH (ref 150–400)
PMV BLD: 11 FL — SIGNIFICANT CHANGE UP (ref 7–13)
POIKILOCYTOSIS BLD QL AUTO: SLIGHT — SIGNIFICANT CHANGE UP
POLYCHROMASIA BLD QL SMEAR: SLIGHT — SIGNIFICANT CHANGE UP
POTASSIUM SERPL-MCNC: 4.3 MMOL/L — SIGNIFICANT CHANGE UP (ref 3.5–5.3)
POTASSIUM SERPL-MCNC: 5.4 MMOL/L — HIGH (ref 3.5–5.3)
POTASSIUM SERPL-SCNC: 4.3 MMOL/L — SIGNIFICANT CHANGE UP (ref 3.5–5.3)
POTASSIUM SERPL-SCNC: 5.4 MMOL/L — HIGH (ref 3.5–5.3)
PROT SERPL-MCNC: 7.4 G/DL — SIGNIFICANT CHANGE UP (ref 6–8.3)
RBC # BLD: 5.86 M/UL — HIGH (ref 3.8–5.2)
RBC # FLD: 16.9 % — HIGH (ref 10.3–14.5)
RBC BLD AUTO: ABNORMAL
SODIUM SERPL-SCNC: 137 MMOL/L — SIGNIFICANT CHANGE UP (ref 135–145)
WBC # BLD: 8.92 K/UL — SIGNIFICANT CHANGE UP (ref 3.8–10.5)
WBC # FLD AUTO: 8.92 K/UL — SIGNIFICANT CHANGE UP (ref 3.8–10.5)

## 2025-07-29 PROCEDURE — 36410 VNPNXR 3YR/> PHY/QHP DX/THER: CPT

## 2025-07-29 PROCEDURE — 99223 1ST HOSP IP/OBS HIGH 75: CPT

## 2025-07-29 PROCEDURE — 85025 COMPLETE CBC W/AUTO DIFF WBC: CPT

## 2025-07-29 PROCEDURE — 84702 CHORIONIC GONADOTROPIN TEST: CPT

## 2025-07-29 PROCEDURE — 80053 COMPREHEN METABOLIC PANEL: CPT

## 2025-07-29 PROCEDURE — 99284 EMERGENCY DEPT VISIT MOD MDM: CPT

## 2025-07-29 PROCEDURE — 86140 C-REACTIVE PROTEIN: CPT

## 2025-07-29 PROCEDURE — 94640 AIRWAY INHALATION TREATMENT: CPT

## 2025-07-29 PROCEDURE — 84132 ASSAY OF SERUM POTASSIUM: CPT

## 2025-07-29 PROCEDURE — 85652 RBC SED RATE AUTOMATED: CPT

## 2025-07-29 RX ORDER — METHYLPREDNISOLONE ACETATE 80 MG/ML
125 INJECTION, SUSPENSION INTRA-ARTICULAR; INTRALESIONAL; INTRAMUSCULAR; SOFT TISSUE ONCE
Refills: 0 | Status: COMPLETED | OUTPATIENT
Start: 2025-07-29 | End: 2025-07-29

## 2025-07-29 RX ORDER — MONTELUKAST SODIUM 10 MG/1
10 TABLET ORAL DAILY
Refills: 0 | Status: DISCONTINUED | OUTPATIENT
Start: 2025-07-29 | End: 2025-08-02

## 2025-07-29 RX ORDER — DIPHENHYDRAMINE HCL 12.5MG/5ML
25 ELIXIR ORAL ONCE
Refills: 0 | Status: COMPLETED | OUTPATIENT
Start: 2025-07-29 | End: 2025-07-29

## 2025-07-29 RX ORDER — FLUTICASONE PROPIONATE 50 UG/1
1 SPRAY, METERED NASAL
Refills: 0 | Status: DISCONTINUED | OUTPATIENT
Start: 2025-07-29 | End: 2025-08-02

## 2025-07-29 RX ORDER — BUMETANIDE 1 MG/1
1 TABLET ORAL DAILY
Refills: 0 | Status: DISCONTINUED | OUTPATIENT
Start: 2025-07-29 | End: 2025-08-02

## 2025-07-29 RX ORDER — AMLODIPINE BESYLATE 10 MG/1
2.5 TABLET ORAL DAILY
Refills: 0 | Status: DISCONTINUED | OUTPATIENT
Start: 2025-07-29 | End: 2025-08-02

## 2025-07-29 RX ADMIN — METHYLPREDNISOLONE ACETATE 125 MILLIGRAM(S): 80 INJECTION, SUSPENSION INTRA-ARTICULAR; INTRALESIONAL; INTRAMUSCULAR; SOFT TISSUE at 14:21

## 2025-07-29 RX ADMIN — Medication 25 MILLIGRAM(S): at 14:21

## 2025-07-29 RX ADMIN — Medication 20 MILLIGRAM(S): at 19:57

## 2025-07-29 RX ADMIN — MONTELUKAST SODIUM 10 MILLIGRAM(S): 10 TABLET ORAL at 19:57

## 2025-07-29 RX ADMIN — Medication 40 MILLIGRAM(S): at 19:56

## 2025-07-29 RX ADMIN — Medication 500 MICROGRAM(S): at 19:43

## 2025-07-29 RX ADMIN — BUMETANIDE 1 MILLIGRAM(S): 1 TABLET ORAL at 19:56

## 2025-07-29 RX ADMIN — Medication 10 MILLIGRAM(S): at 19:56

## 2025-07-29 NOTE — ED ADULT TRIAGE NOTE - CHIEF COMPLAINT QUOTE
pt awoke with right eye swollen shut itchy swelling eyelid and under eye with some drainage denies any injury hx pulmonary htn and recently started on htn med unsure of name

## 2025-07-29 NOTE — ED PROVIDER NOTE - PROGRESS NOTE DETAILS
Spoke to immunology attending Dr. Torres. They will see patient today in ED after outpatient schedule is complete. Will place pt in CDU for observation. Basia Millan PA-C

## 2025-07-29 NOTE — ED PROVIDER NOTE - ATTENDING APP SHARED VISIT CONTRIBUTION OF CARE
PMD Car Pulmonary, [facility]  45-year-old female PMH pulmonary hypertension, HF status post PPM, chronic hypoxia on 3 L nasal cannula, chronic PE no longer on DOAC, SVT status post ablation, asthma, recurrent cutaneous abscesses, last admitted 531 for left lower extremity abscesses, patient returned to room 7/6/ I do 25 with lip swelling, and left arm abscess, status post I&D by surgery.  Angioedema was treated by allergy with prednisone taper, montelukast and famotidine, patient now returns with a day history of right eye periorbital swelling mild pruritus, without fever chills short of breath chest pain stridor lip swelling.  Adult female awake alert GCS 15  HEENT normocephalic atraumatic, right periorbital edema, without erythema, tenderness, vesicles.  EOM intact.  Chest clear A&P.  CV no discussed murmur.  With soft positive bowel sounds  Neuro GCS 15 speech moves all extremities  Rene Bearden MD, [facility]

## 2025-07-29 NOTE — ED CDU PROVIDER INITIAL DAY NOTE - CHIEF COMPLAINT
The patient is a 45y Female complaining of  The patient is a 45y Female complaining of R periorbital swelling and itching.

## 2025-07-29 NOTE — ED CDU PROVIDER INITIAL DAY NOTE - NSICDXPASTSURGICALHX_GEN_ALL_CORE_FT
PAST SURGICAL HISTORY:  History of pacemaker 12/19 - [employer] model Ingevity 4469    Pacemaker

## 2025-07-29 NOTE — ED PROVIDER NOTE - PHYSICAL EXAMINATION
CONSTITUTIONAL: Patient is awake, alert and oriented x 3, wears 02,   HEAD: NCAT  EYES: PERRL bilaterally, EOMI, (+) edema to right orbit, ttp to right lateral orbit, no pain with EOM,   ENT: Airway patent,   NECK: Supple,   LUNGS: CTA B/L,   HEART: RRR.+S1S2   MSK: FROM upper and lower ext b/l,   SKIN: No rash or lesions  NEURO: No focal deficits,

## 2025-07-29 NOTE — ED PROCEDURE NOTE - ATTENDING APP SHARED VISIT CONTRIBUTION OF CARE
immediately available for procedure assistance  Rene Bearden MD, [facility]

## 2025-07-29 NOTE — CONSULT NOTE ADULT - ASSESSMENT
45 year old female with history of chronic hypoxic respiratory failure (on 2LNC at home) Group 1/IV PAH, HF s/p PPM, PE , SVT s/p ablation, asthma, and recurrent abscesses at site of remouldin infusion presented to the hospital for swelling of right upper eyelid s/p Benadryl and solumedrol in the ED. This is her 4th episode of angioedema over the past year. A/I consulted for further evaluation of recurrent angioedema.     Patient had extensive work up for hereditary angioedema including functional assay for C1 inhibitor 2 weeks ago that was unremarkable. Her elevated IgE levels from July 10th suggest  that she is severely atopic. There is high suspicion for idiopathic angioedema since she has responded to steroids and antihistamines.     Recommendations:   - No indication to repeat the work up for hereditary angioedema  - Start Cetirzine 10mg BID  - Pepcid 20mg QD  - Consider steroid taper and monitor swelling resolution    - Follow up with Allergy/ Immunology outpatient after discharge    Patient and plan discussed with Dr. Gagandeep Torres. Attending addendum to follow.

## 2025-07-29 NOTE — ED ADULT NURSE REASSESSMENT NOTE - NS ED NURSE REASSESS COMMENT FT1
pt given meal tray, resting comfortably
Pt received from SHY Mann at 19:00hrs. Pt A&O x 4. Pt in CDU for continuous pulse ox monitoring, and antihistamine. Pt denies any chest pain, dizziness or palpitations. Atrovent nebulizer given for SOB with good relief. V/S stable, O2 sat % on O2 2L NC. IV 18G Lt upper arm, patent and free of signs of infiltration. Pt resting in bed. Puerwick applied for urination as patient requested. Safety & comfort measures maintained. Call bell in reach. Will continue to monitor.

## 2025-07-29 NOTE — ED CDU PROVIDER INITIAL DAY NOTE - CLINICAL SUMMARY MEDICAL DECISION MAKING FREE TEXT BOX
Adult female complicated medical hx pw c/o rt periorbital swelling pruritis. Pt recently admitted for cutaneous abscess w sepsis  c/b angioedema. C/F recurrence of same. Not cw cellulitis. no fever and chills,erythema.ttp. Plan steroids, benadryl and allergy cs.  Rene Bearden MD, [facility]

## 2025-07-29 NOTE — ED CDU PROVIDER INITIAL DAY NOTE - DETAILS
frequent reeval, vitals per routine, continuous pulse oximetry, anti-histamine, allergy and pulm following.

## 2025-07-29 NOTE — ED PROVIDER NOTE - HOW PATIENT ADDRESSED, PROFILE
Liz Hemigard Intro: Due to skin fragility and wound tension, it was decided to use HEMIGARD adhesive retention suture devices to permit a linear closure. The skin was cleaned and dried for a 6cm distance away from the wound. Excessive hair, if present, was removed to allow for adhesion.

## 2025-07-29 NOTE — CONSULT NOTE ADULT - GENERAL
Endoscope was pre-cleaned at bedside immediately following procedure by ALEJANDRO/RUPAL Aceves negative

## 2025-07-29 NOTE — ED PROVIDER NOTE - CLINICAL SUMMARY MEDICAL DECISION MAKING FREE TEXT BOX
Adult female multiple medical problems as above recent admission for cutaneous abscesses , with angioedema.  Now presents with right eye periorbital swelling with mild pruritus without trauma, fever chills, redness warmth pain.Concerns for recurrent angioedema, will check basic labs ESR CRP, consult allergy/immunology.  Rene Bearden MD, [facility]

## 2025-07-29 NOTE — ED PROVIDER NOTE - NSICDXPASTSURGICALHX_GEN_ALL_CORE_FT
PAST SURGICAL HISTORY:  History of pacemaker 12/19 - [employer] model Ingevity 4469    Pacemaker

## 2025-07-29 NOTE — ED CDU PROVIDER INITIAL DAY NOTE - OBJECTIVE STATEMENT
46 y/o female with PMHx of HTN, asthma, HF, pulmonary HTN, chronic hypoxia on 3LNC, chronic PE no longer on DOAC, SVT s/p ablation, recurrent abscesses, recent admission to [place] from July 6, 2025 to July 19, 2025 for sepsis due to left arm abscess presents to the ED with swelling to right eye. Recent swelling to the right eye today.  +itching to r eye. no pain. Denies any trauma to the area.  Denies any discharge in the eye.  Denies any change in vision.  hospitalization was complicated by angioedema of lips.  Patient was discharged home with a steroid taper which she has completed.  Patient was not discharged on any antibiotics.  States that her skin infection is significantly improved.  She has been feeling well lately.  States she has had this in the past around Qi time but never found out the cause.  Patient denies any fevers or chills or other complaints.  pt reports improvement while in ED, as swelling improved mildly but is starting to get more itchy again. no visual change.  pt states tachycardia at baseline.

## 2025-07-29 NOTE — ED ADULT NURSE NOTE - NSICDXPASTSURGICALHX_GEN_ALL_CORE_FT
PAST SURGICAL HISTORY:  History of pacemaker 12/19 - [employer] model Ingevity 4469    Pacemaker

## 2025-07-29 NOTE — ED CDU PROVIDER INITIAL DAY NOTE - ATTENDING APP SHARED VISIT CONTRIBUTION OF CARE
I have personally performed a face to face diagnostic evaluation on this patient.  I have reviewed the ACP note and agree with the history, exam, and plan of care, except as noted.  History and Exam by me shows  See Ed provider note  Rene Bearden MD, Facep I have personally performed a face to face diagnostic evaluation on this patient.  I have reviewed the ACP note and agree with the history, exam, and plan of care, except as noted.  History and Exam by me shows  See Ed provider note.  Rene Bearden MD, [facility]ep

## 2025-07-29 NOTE — ED CDU PROVIDER INITIAL DAY NOTE - PHYSICAL EXAMINATION
CONSTITUTIONAL: Patient is awake, alert and oriented x 3, wears 02,   HEAD: NCAT  EYES: PERRL bilaterally, EOMI, (+) edema to right orbit, ttp to right lateral orbit, no pain with EOM,   ENT: Airway patent,   NECK: Supple,   LUNGS: CTA B/L,   HEART: tachycardic,+S1S2   MSK: FROM upper and lower ext b/l,   SKIN: No rash or lesions  NEURO: No focal deficits,

## 2025-07-29 NOTE — CONSULT NOTE ADULT - SUBJECTIVE AND OBJECTIVE BOX
Patient is a 45 year old female with history of chronic hypoxic respiratory failure (on 2L NC at home), Group 1/IV PAH, HF s/p PPM, PE , SVT s/p ablation, asthma, and recurrent abscesses at site of remouldin infusion presented to the ED on 07/29 due to swelling of right upper eyelid. Patient said she woke up this morning and noticed her right upper eyelid was swollen with associated discomfort and mild pruritus. Patient denied swelling of lips, throat tightness, stridor, difficulty clearing secretions, hives, shortness of breath, hypotension, dizziness or lightheadedness. This is her 4th episode of swelling over the past year.  In Sept 2024, she noted swelling of b/l eyes. In Dec 2024, she developed swelling of her lips. Most recent one was 2 weeks ago and involved lips. Each episode lasts less than 24 hours and usually responds to steroids and antihistamines. Patient denies family history of angioedema, allergic rhinitis or asthma. She has 2 children, one of the daughters has eczema. Patient states she has perennial allergies however has never been tested. In the ED, patient received a dose of methylprednisolone and benadryl.     Allergy/Immunology consulted for evaluation of recurrent angioedema.    Of note, allergy previously contacted on 6/30 consulted for contact dermatitis adhesive tape in setting of history of multiple skin infections. Patient states dermatitis is improved with paper tape.      Pertinent Labs:   07/10/2025: C1 esterase inhibitor 15, C3 105, C4 29  IgE 2537 (suggests severe atopy)  06/03/2025: ESR 12

## 2025-07-29 NOTE — ED PROVIDER NOTE - OBJECTIVE STATEMENT
46 y/o female with PMHx of HTN, asthma, HF, pulmonary HTN, chronic hypoxia on 3LNC, chronic PE no longer on DOAC, SVT s/p ablation, recurrent abscesses, recent admission to [place] from July 6, 2025 to July 19, 2025 for sepsis due to left arm abscess presents to the ED with swelling to right eye. Recent swelling to the right eye today.  States that the corner of the right eye is painful.  Also complains that the skin feels itchy around her right eye.  Denies any trauma to the area.  Denies any discharge in the eye.  Denies any change in vision.  hospitalization was complicated by angioedema of lips.  Patient was discharged home with a steroid taper which she has completed.  Patient was not discharged on any antibiotics.  States that her skin infection is significantly improved.  She has been feeling well lately.  Today states she woke up with denies wearing contacts.  States she has had this in the past around Qi time but never found out the cause.  Patient denies any fevers or chills or other complaints.

## 2025-07-29 NOTE — ED ADULT NURSE NOTE - OBJECTIVE STATEMENT
Pt came to the ED with swelling to right eye. Recent swelling to the right eye today.  States that the corner of the right eye is painful.  Also complains that the skin feels itchy around her right eye.  Denies any trauma to the area. Pt is in no distress. Breathing easy and non labored. Pt uses oxygen at home due to chronic hypoxia.

## 2025-07-29 NOTE — ED CDU PROVIDER INITIAL DAY NOTE - PROGRESS NOTE DETAILS
CDU PROGRESS NOTE RAYMOND ROTH: Received pt at 1900 sign-out. Case/plan reviewed. Pt resting in stretcher in NAD. VSS. EYES: PERRL bilaterally, EOMI, (+) right periorbital edema, no pain with EOM, visual acuity intact. Left eye wnl. Pt reports swelling has improved, will continue to monitor overnight.

## 2025-07-29 NOTE — CONSULT NOTE ADULT - ATTENDING COMMENTS
45 year old female with history of chronic hypoxic respiratory failure (on 2LNC at home) Group 1/IV PAH, HF s/p PPM, PE , SVT s/p ablation, asthma, and recurrent abscesses at site of Remodulin infusion for primary pulmonary hypertension who again presented to the ED for swelling of her face, this time her right upper eyelid. She was treated with Benadryl and solumedrol in the ED. This is her 4th episode of angioedema over the past year. A/I consulted for further evaluation of recurrent angioedema.  Past ED visit and A&I consult work up was negative for hereditary angioedema with a normal C3, C3, C1q, with an elevated IgE supportive of atopic disease.  Pt is to be treated with antihistamines and steroids on discharge and return for an outpatient A&I visit with Dr. Fry for further evaluation of what may be idiopathic angioedema.

## 2025-07-30 VITALS
HEART RATE: 101 BPM | OXYGEN SATURATION: 97 % | DIASTOLIC BLOOD PRESSURE: 74 MMHG | RESPIRATION RATE: 16 BRPM | SYSTOLIC BLOOD PRESSURE: 107 MMHG | TEMPERATURE: 98 F

## 2025-07-30 PROCEDURE — 85025 COMPLETE CBC W/AUTO DIFF WBC: CPT

## 2025-07-30 PROCEDURE — 86140 C-REACTIVE PROTEIN: CPT

## 2025-07-30 PROCEDURE — 84702 CHORIONIC GONADOTROPIN TEST: CPT

## 2025-07-30 PROCEDURE — G0378: CPT

## 2025-07-30 PROCEDURE — 99238 HOSP IP/OBS DSCHRG MGMT 30/<: CPT

## 2025-07-30 PROCEDURE — 96374 THER/PROPH/DIAG INJ IV PUSH: CPT

## 2025-07-30 PROCEDURE — 94640 AIRWAY INHALATION TREATMENT: CPT

## 2025-07-30 PROCEDURE — 96375 TX/PRO/DX INJ NEW DRUG ADDON: CPT

## 2025-07-30 PROCEDURE — 85652 RBC SED RATE AUTOMATED: CPT

## 2025-07-30 PROCEDURE — 99284 EMERGENCY DEPT VISIT MOD MDM: CPT | Mod: 25

## 2025-07-30 PROCEDURE — 96376 TX/PRO/DX INJ SAME DRUG ADON: CPT

## 2025-07-30 PROCEDURE — 84132 ASSAY OF SERUM POTASSIUM: CPT

## 2025-07-30 PROCEDURE — 80053 COMPREHEN METABOLIC PANEL: CPT

## 2025-07-30 RX ORDER — ACETAMINOPHEN 500 MG/5ML
650 LIQUID (ML) ORAL ONCE
Refills: 0 | Status: COMPLETED | OUTPATIENT
Start: 2025-07-30 | End: 2025-07-30

## 2025-07-30 RX ORDER — METHYLPREDNISOLONE ACETATE 80 MG/ML
40 INJECTION, SUSPENSION INTRA-ARTICULAR; INTRALESIONAL; INTRAMUSCULAR; SOFT TISSUE ONCE
Refills: 0 | Status: COMPLETED | OUTPATIENT
Start: 2025-07-30 | End: 2025-07-30

## 2025-07-30 RX ORDER — METHYLPREDNISOLONE ACETATE 80 MG/ML
1 INJECTION, SUSPENSION INTRA-ARTICULAR; INTRALESIONAL; INTRAMUSCULAR; SOFT TISSUE
Qty: 1 | Refills: 0
Start: 2025-07-30

## 2025-07-30 RX ADMIN — Medication 40 MILLIGRAM(S): at 09:07

## 2025-07-30 RX ADMIN — AMLODIPINE BESYLATE 2.5 MILLIGRAM(S): 10 TABLET ORAL at 09:07

## 2025-07-30 RX ADMIN — Medication 20 MILLIGRAM(S): at 11:57

## 2025-07-30 RX ADMIN — FLUTICASONE PROPIONATE 1 SPRAY(S): 50 SPRAY, METERED NASAL at 09:07

## 2025-07-30 RX ADMIN — METHYLPREDNISOLONE ACETATE 40 MILLIGRAM(S): 80 INJECTION, SUSPENSION INTRA-ARTICULAR; INTRALESIONAL; INTRAMUSCULAR; SOFT TISSUE at 10:00

## 2025-07-30 RX ADMIN — Medication 10 MILLIGRAM(S): at 10:00

## 2025-07-30 RX ADMIN — Medication 650 MILLIGRAM(S): at 10:20

## 2025-07-30 RX ADMIN — MONTELUKAST SODIUM 10 MILLIGRAM(S): 10 TABLET ORAL at 11:57

## 2025-07-30 RX ADMIN — Medication 10 MILLIGRAM(S): at 11:57

## 2025-07-30 NOTE — ED CDU PROVIDER DISPOSITION NOTE - CLINICAL COURSE
46 y/o female with PMHx of HTN, asthma, HF, pulmonary HTN, chronic hypoxia on 3LNC, chronic PE no longer on DOAC, SVT s/p ablation, recurrent abscesses, recent admission to [place] from July 6, 2025 to July 19, 2025 for sepsis due to left arm abscess presents to the ED with swelling to right eye. Recent swelling to the right eye today.  +itching to r eye. no pain. Denies any trauma to the area.  Denies any discharge in the eye.  Denies any change in vision.  hospitalization was complicated by angioedema of lips.  Patient was discharged home with a steroid taper which she has completed.  Patient was not discharged on any antibiotics.  States that her skin infection is significantly improved.  She has been feeling well lately.  States she has had this in the past around Qi time but never found out the cause.  Patient denies any fevers or chills or other complaints.  pt reports improvement while in ED, as swelling improved mildly but is starting to get more itchy again. no visual change. Pt seen by Allergy/Immunology and  treated with antihistamines and steroids in CDU. Plan for an outpatient A&I visit with Dr. Fry for further evaluation of what may be idiopathic angioedema. 44 y/o female with PMHx of HTN, asthma, HF, pulmonary HTN, chronic hypoxia on 3LNC, chronic PE no longer on DOAC, SVT s/p ablation, recurrent abscesses, recent admission to [place] from July 6, 2025 to July 19, 2025 for sepsis due to left arm abscess presents to the ED with swelling to right eye. Recent swelling to the right eye today.  +itching to r eye. no pain. Denies any trauma to the area.  Denies any discharge in the eye.  Denies any change in vision.  hospitalization was complicated by angioedema of lips.  Patient was discharged home with a steroid taper which she has completed.  Patient was not discharged on any antibiotics.  States that her skin infection is significantly improved.  She has been feeling well lately.  States she has had this in the past around Qi time but never found out the cause.  Patient denies any fevers or chills or other complaints.  pt reports improvement while in ED, as swelling improved mildly but is starting to get more itchy again. no visual change. Pt seen by Allergy/Immunology and  treated with antihistamines and steroids in CDU. Plan for an outpatient A&I visit with Dr. Fry for further evaluation of what may be idiopathic angioedema.  mproved. pt with angioedema R periorbital area - improved after steroids and antihistamines.  pt was seen by All/Immun - pt cleared for d/c with medrol dose pk and Zyrtec.  P/E -  sl edema R orbit.  pt has had multiple episodes similar in the past.

## 2025-07-30 NOTE — ED CDU PROVIDER DISPOSITION NOTE - PATIENT PORTAL LINK FT
You can access the FollowMyHealth Patient Portal offered by [facility] by registering at the following website: [email or web address]. By joining [employer]’s FollowMyHealth portal, you will also be able to view your health information using other applications (apps) compatible with our system.

## 2025-07-30 NOTE — ED CDU PROVIDER DISPOSITION NOTE - NSFOLLOWUPINSTRUCTIONS_ED_ALL_ED_FT
1) Follow-up with your Primary Medical Doctor or referred doctor. Call today / next business day for prompt follow-up.    Continue antihistamines and steroids as prescribed on discharge and return for an outpatient visit with Dr. Fry for further evaluation of what may be idiopathic angioedema    2) Return to Emergency room for any worsening or persistent pain, weakness, fever, or any other concerning symptoms.    3) See attached instruction sheets for additional information, including information regarding signs and symptoms to look out for, reasons to seek immediate care and other important instructions. 1) Follow-up with your Primary Medical Doctor or referred doctor. Call today / next business day for prompt follow-up.    Continue antihistamines and steroids as prescribed on discharge and return for an outpatient visit with Dr. Fry for further evaluation of what may be idiopathic angioedema; Medrol dose pack- start the dosage tomorrow. Over the counter Zyrtec.     2) Return to Emergency room for any worsening or persistent pain, weakness, fever, or any other concerning symptoms.    3) See attached instruction sheets for additional information, including information regarding signs and symptoms to look out for, reasons to seek immediate care and other important instructions.    Julissa Fry)  Internal Medicine  [address] [facility] [address], [address]  [place] [address]  Phone: [phone]

## 2025-07-30 NOTE — ED CDU PROVIDER SUBSEQUENT DAY NOTE - PHYSICAL EXAMINATION
CONSTITUTIONAL: Patient is awake, alert and oriented x 3, wears 02,   HEAD: NCAT  EYES: PERRL bilaterally, EOMI, (+) mild right periorbital edema, no pain with EOM, visual acuity intact  ENT: Airway patent,   NECK: Supple,   LUNGS: CTA B/L,   HEART: +S1S2   MSK: FROM upper and lower ext b/l,   SKIN: No rash or lesions  NEURO: No focal deficits,

## 2025-07-30 NOTE — ED CDU PROVIDER SUBSEQUENT DAY NOTE - NSICDXPASTSURGICALHX_GEN_ALL_CORE_FT
PAST SURGICAL HISTORY:  History of pacemaker 12/19 - [employer] model [employer]gevity 4469    Pacemaker

## 2025-07-30 NOTE — ED CDU PROVIDER SUBSEQUENT DAY NOTE - HISTORY
CDU PROGRESS NOTE PA IRA: Pt resting in stretcher in NAD. Exam w/ some improvement compared to prior. EYES: PERRL bilaterally, EOMI, (+) mild right periorbital edema, no pain with EOM, visual acuity intact. Left eye wnl. Allergy/Immunology recs appreciated likely dispo on steroid taper, Cetrizine 10mg bid and Pepcid 20mg qd.

## 2025-07-30 NOTE — ED CDU PROVIDER SUBSEQUENT DAY NOTE - PROGRESS NOTE DETAILS
pt with angioedema R periorbital area - improved after steroids and antihistamines.  pt was seen by [facility] - pt cleared for d/c with medrol dose pk and Zyrtec.  P/E -  sl edema R orbit.  pt has had multiple episodes similar in the past.

## 2025-07-30 NOTE — ED CDU PROVIDER DISPOSITION NOTE - CARE PROVIDER_API CALL
Julissa Fry)  Internal Medicine  [address], [address]  [place] [address]  Phone: [phone]  Fax: [phone]  Follow Up Time:

## 2025-07-31 NOTE — PATIENT PROFILE ADULT - HAVE YOU EXPERIENCED VIOLENCE OR A TRAUMATIC EVENT?
07/31/25 1630   Physical Activity   On average, how many days per week do you engage in moderate to strenuous exercise (like a brisk walk)? 0 days  (pt sts that he is very active)   On average, how many minutes do you engage in exercise at this level? 0 min   Financial Resource Strain   How hard is it for you to pay for the very basics like food, housing, medical care, and heating? Not hard   Housing Stability   In the last 12 months, was there a time when you were not able to pay the mortgage or rent on time? N   In the past 12 months, how many times have you moved where you were living? 0   At any time in the past 12 months, were you homeless or living in a shelter (including now)? N   Transportation Needs   In the past 12 months, has lack of transportation kept you from medical appointments or from getting medications? no   In the past 12 months, has lack of transportation kept you from meetings, work, or from getting things needed for daily living? No   Food Insecurity   Within the past 12 months, you worried that your food would run out before you got the money to buy more. Never true   Within the past 12 months, the food you bought just didn't last and you didn't have money to get more. Never true   Stress   Do you feel stress - tense, restless, nervous, or anxious, or unable to sleep at night because your mind is troubled all the time - these days? Not at all   Social Connections   In a typical week, how many times do you talk on the phone with family, friends, or neighbors? More than 3   How often do you get together with friends or relatives? More than 3   How often do you attend Religion or [employer] services? Never   Do you belong to any clubs or organizations such as Religion groups, unions, fraternal or athletic groups, or school groups? No   How often do you attend meetings of the clubs or organizations you belong to? Never   Are you , , , , never , or living with a  partner?    Intimate Partner Violence   Within the last year, have you been afraid of your partner or ex-partner? No   Within the last year, have you been humiliated or emotionally abused in other ways by your partner or ex-partner? No   Within the last year, have you been kicked, hit, slapped, or otherwise physically hurt by your partner or ex-partner? No   Within the last year, have you been raped or forced to have any kind of sexual activity by your partner or ex-partner? No   Alcohol Use   Q1: How often do you have a drink containing alcohol? 4 or more ti   Q2: How many drinks containing alcohol do you have on a typical day when you are drinking? 1 or 2   Q3: How often do you have six or more drinks on one occasion? Never   Utilities   In the past 12 months has the electric, gas, oil, or water company threatened to shut off services in your home? No   Health Literacy   How often do you need to have someone help you when you read instructions, pamphlets, or other written material from your doctor or pharmacy? Never   Follow-Ups   We make community resources available to all of our patients to assist with everyday needs. We may be able to connect you with those resources. Would you be interested? N        no

## 2025-07-31 NOTE — ED POST DISCHARGE NOTE - RESULT SUMMARY
Lab called, ESR cancelled. Spoke w/ patient, informed her of this cancellation and recommended PMD/pulm f/u as advised. she acknowledged understanding and was appreciative of call. - Morales William PA-C

## 2025-08-08 ENCOUNTER — APPOINTMENT (OUTPATIENT)
Dept: PEDIATRIC ALLERGY IMMUNOLOGY | Facility: CLINIC | Age: 45
End: 2025-08-08
Payer: MEDICAID

## 2025-08-08 DIAGNOSIS — H10.13 ACUTE ATOPIC CONJUNCTIVITIS, BILATERAL: ICD-10-CM

## 2025-08-08 DIAGNOSIS — T36.8X5S: ICD-10-CM

## 2025-08-08 DIAGNOSIS — J31.0 CHRONIC RHINITIS: ICD-10-CM

## 2025-08-08 PROCEDURE — 99204 OFFICE O/P NEW MOD 45 MIN: CPT | Mod: 95

## 2025-08-08 RX ORDER — OLOPATADINE HYDROCHLORIDE OPHTHALMIC 1 MG/ML
0.1 SOLUTION OPHTHALMIC TWICE DAILY
Qty: 3 | Refills: 1 | Status: ACTIVE | COMMUNITY
Start: 2025-08-08 | End: 1900-01-01

## 2025-08-08 RX ORDER — FLUTICASONE PROPIONATE 50 UG/1
50 SPRAY NASAL DAILY
Qty: 3 | Refills: 1 | Status: ACTIVE | COMMUNITY
Start: 2025-08-08 | End: 1900-01-01

## 2025-08-08 RX ORDER — CETIRIZINE HYDROCHLORIDE 10 MG/1
10 TABLET, COATED ORAL
Qty: 1 | Refills: 1 | Status: ACTIVE | COMMUNITY
Start: 2025-08-08 | End: 1900-01-01

## 2025-08-12 ENCOUNTER — APPOINTMENT (OUTPATIENT)
Dept: HEMATOLOGY ONCOLOGY | Facility: CLINIC | Age: 45
End: 2025-08-12
Payer: MEDICAID

## 2025-08-12 DIAGNOSIS — D64.9 ANEMIA, UNSPECIFIED: ICD-10-CM

## 2025-08-12 PROCEDURE — 99214 OFFICE O/P EST MOD 30 MIN: CPT | Mod: 95

## 2025-08-14 ENCOUNTER — APPOINTMENT (OUTPATIENT)
Dept: PULMONOLOGY | Facility: CLINIC | Age: 45
End: 2025-08-14

## 2025-08-14 VITALS
OXYGEN SATURATION: 95 % | HEART RATE: 111 BPM | TEMPERATURE: 97.9 F | WEIGHT: 220 LBS | BODY MASS INDEX: 36.65 KG/M2 | SYSTOLIC BLOOD PRESSURE: 120 MMHG | HEIGHT: 65 IN | DIASTOLIC BLOOD PRESSURE: 81 MMHG

## 2025-08-14 PROCEDURE — 96372 THER/PROPH/DIAG INJ SC/IM: CPT

## 2025-08-14 PROCEDURE — 99214 OFFICE O/P EST MOD 30 MIN: CPT | Mod: 25

## 2025-08-14 RX ORDER — BENRALIZUMAB 30 MG/ML
30 INJECTION, SOLUTION SUBCUTANEOUS
Refills: 0 | Status: COMPLETED | OUTPATIENT
Start: 2025-08-14

## 2025-08-14 RX ADMIN — BENRALIZUMAB 30 MG/ML: 30 INJECTION, SOLUTION SUBCUTANEOUS at 00:00

## 2025-08-15 ENCOUNTER — INPATIENT (INPATIENT)
Facility: HOSPITAL | Age: 45
LOS: 0 days | Discharge: HOME | DRG: 916 | End: 2025-08-16
Attending: STUDENT IN AN ORGANIZED HEALTH CARE EDUCATION/TRAINING PROGRAM | Admitting: STUDENT IN AN ORGANIZED HEALTH CARE EDUCATION/TRAINING PROGRAM
Payer: MEDICAID

## 2025-08-15 VITALS
DIASTOLIC BLOOD PRESSURE: 95 MMHG | TEMPERATURE: 99 F | RESPIRATION RATE: 20 BRPM | HEIGHT: 65 IN | HEART RATE: 105 BPM | OXYGEN SATURATION: 97 % | WEIGHT: 240.08 LBS | SYSTOLIC BLOOD PRESSURE: 121 MMHG

## 2025-08-15 DIAGNOSIS — T78.3XXA ANGIONEUROTIC EDEMA, INITIAL ENCOUNTER: ICD-10-CM

## 2025-08-15 DIAGNOSIS — Z95.0 PRESENCE OF CARDIAC PACEMAKER: Chronic | ICD-10-CM

## 2025-08-15 DIAGNOSIS — J96.11 CHRONIC RESPIRATORY FAILURE WITH HYPOXIA: ICD-10-CM

## 2025-08-15 DIAGNOSIS — Z86.711 PERSONAL HISTORY OF PULMONARY EMBOLISM: ICD-10-CM

## 2025-08-15 DIAGNOSIS — J45.909 UNSPECIFIED ASTHMA, UNCOMPLICATED: ICD-10-CM

## 2025-08-15 DIAGNOSIS — I10 ESSENTIAL (PRIMARY) HYPERTENSION: ICD-10-CM

## 2025-08-15 DIAGNOSIS — Z29.9 ENCOUNTER FOR PROPHYLACTIC MEASURES, UNSPECIFIED: ICD-10-CM

## 2025-08-15 DIAGNOSIS — I50.22 CHRONIC SYSTOLIC (CONGESTIVE) HEART FAILURE: ICD-10-CM

## 2025-08-15 LAB
ANION GAP SERPL CALC-SCNC: 12 MMOL/L — SIGNIFICANT CHANGE UP (ref 5–17)
BUN SERPL-MCNC: 7 MG/DL — SIGNIFICANT CHANGE UP (ref 7–23)
CALCIUM SERPL-MCNC: 9 MG/DL — SIGNIFICANT CHANGE UP (ref 8.4–10.5)
CHLORIDE SERPL-SCNC: 105 MMOL/L — SIGNIFICANT CHANGE UP (ref 96–108)
CO2 SERPL-SCNC: 19 MMOL/L — LOW (ref 22–31)
CREAT SERPL-MCNC: 0.74 MG/DL — SIGNIFICANT CHANGE UP (ref 0.5–1.3)
EGFR: 102 ML/MIN/1.73M2 — SIGNIFICANT CHANGE UP
EGFR: 102 ML/MIN/1.73M2 — SIGNIFICANT CHANGE UP
GLUCOSE SERPL-MCNC: 117 MG/DL — HIGH (ref 70–99)
HCT VFR BLD CALC: 41 % — SIGNIFICANT CHANGE UP (ref 34.5–45)
HGB BLD-MCNC: 12.6 G/DL — SIGNIFICANT CHANGE UP (ref 11.5–15.5)
MCHC RBC-ENTMCNC: 21.1 PG — LOW (ref 27–34)
MCHC RBC-ENTMCNC: 30.7 G/DL — LOW (ref 32–36)
MCV RBC AUTO: 68.6 FL — LOW (ref 80–100)
NRBC # BLD AUTO: 0 K/UL — SIGNIFICANT CHANGE UP (ref 0–0)
NRBC # FLD: 0 K/UL — SIGNIFICANT CHANGE UP (ref 0–0)
NRBC BLD AUTO-RTO: 0 /100 WBCS — SIGNIFICANT CHANGE UP (ref 0–0)
PLATELET # BLD AUTO: 377 K/UL — SIGNIFICANT CHANGE UP (ref 150–400)
PMV BLD: 10.3 FL — SIGNIFICANT CHANGE UP (ref 7–13)
POTASSIUM SERPL-MCNC: 4.4 MMOL/L — SIGNIFICANT CHANGE UP (ref 3.5–5.3)
POTASSIUM SERPL-SCNC: 4.4 MMOL/L — SIGNIFICANT CHANGE UP (ref 3.5–5.3)
RBC # BLD: 5.98 M/UL — HIGH (ref 3.8–5.2)
RBC # FLD: 17.9 % — HIGH (ref 10.3–14.5)
SODIUM SERPL-SCNC: 136 MMOL/L — SIGNIFICANT CHANGE UP (ref 135–145)
WBC # BLD: 8.63 K/UL — SIGNIFICANT CHANGE UP (ref 3.8–10.5)
WBC # FLD AUTO: 8.63 K/UL — SIGNIFICANT CHANGE UP (ref 3.8–10.5)

## 2025-08-15 PROCEDURE — 85027 COMPLETE CBC AUTOMATED: CPT

## 2025-08-15 PROCEDURE — 36415 COLL VENOUS BLD VENIPUNCTURE: CPT

## 2025-08-15 PROCEDURE — 99223 1ST HOSP IP/OBS HIGH 75: CPT

## 2025-08-15 PROCEDURE — 99223 1ST HOSP IP/OBS HIGH 75: CPT | Mod: GC

## 2025-08-15 PROCEDURE — 80048 BASIC METABOLIC PNL TOTAL CA: CPT

## 2025-08-15 PROCEDURE — 99285 EMERGENCY DEPT VISIT HI MDM: CPT

## 2025-08-15 PROCEDURE — 94640 AIRWAY INHALATION TREATMENT: CPT

## 2025-08-15 RX ORDER — LANOLIN/MINERAL OIL/PETROLATUM
1 OINTMENT (GRAM) OPHTHALMIC (EYE) DAILY
Refills: 0 | Status: DISCONTINUED | OUTPATIENT
Start: 2025-08-15 | End: 2025-08-16

## 2025-08-15 RX ORDER — ENOXAPARIN SODIUM 100 MG/ML
40 INJECTION SUBCUTANEOUS EVERY 24 HOURS
Refills: 0 | Status: DISCONTINUED | OUTPATIENT
Start: 2025-08-15 | End: 2025-08-16

## 2025-08-15 RX ORDER — DIPHENHYDRAMINE HCL 12.5MG/5ML
50 ELIXIR ORAL ONCE
Refills: 0 | Status: COMPLETED | OUTPATIENT
Start: 2025-08-15 | End: 2025-08-15

## 2025-08-15 RX ORDER — MELATONIN 5 MG
3 TABLET ORAL AT BEDTIME
Refills: 0 | Status: DISCONTINUED | OUTPATIENT
Start: 2025-08-15 | End: 2025-08-16

## 2025-08-15 RX ORDER — IPRATROPIUM BROMIDE AND ALBUTEROL SULFATE .5; 2.5 MG/3ML; MG/3ML
3 SOLUTION RESPIRATORY (INHALATION) EVERY 6 HOURS
Refills: 0 | Status: DISCONTINUED | OUTPATIENT
Start: 2025-08-15 | End: 2025-08-16

## 2025-08-15 RX ORDER — BUMETANIDE 1 MG/1
1 TABLET ORAL DAILY
Refills: 0 | Status: DISCONTINUED | OUTPATIENT
Start: 2025-08-15 | End: 2025-08-16

## 2025-08-15 RX ORDER — FLUTICASONE PROPIONATE 50 UG/1
1 SPRAY, METERED NASAL
Refills: 0 | Status: DISCONTINUED | OUTPATIENT
Start: 2025-08-15 | End: 2025-08-16

## 2025-08-15 RX ORDER — DEXAMETHASONE 0.5 MG/1
6 TABLET ORAL ONCE
Refills: 0 | Status: COMPLETED | OUTPATIENT
Start: 2025-08-15 | End: 2025-08-15

## 2025-08-15 RX ORDER — ACETAMINOPHEN 500 MG/5ML
1000 LIQUID (ML) ORAL ONCE
Refills: 0 | Status: COMPLETED | OUTPATIENT
Start: 2025-08-15 | End: 2025-08-15

## 2025-08-15 RX ORDER — MAGNESIUM, ALUMINUM HYDROXIDE 200-200 MG
30 TABLET,CHEWABLE ORAL EVERY 4 HOURS
Refills: 0 | Status: DISCONTINUED | OUTPATIENT
Start: 2025-08-15 | End: 2025-08-16

## 2025-08-15 RX ORDER — ONDANSETRON HCL/PF 4 MG/2 ML
4 VIAL (ML) INJECTION EVERY 8 HOURS
Refills: 0 | Status: DISCONTINUED | OUTPATIENT
Start: 2025-08-15 | End: 2025-08-16

## 2025-08-15 RX ORDER — DIPHENHYDRAMINE HCL 12.5MG/5ML
25 ELIXIR ORAL ONCE
Refills: 0 | Status: COMPLETED | OUTPATIENT
Start: 2025-08-15 | End: 2025-08-15

## 2025-08-15 RX ORDER — ACETAMINOPHEN 500 MG/5ML
650 LIQUID (ML) ORAL EVERY 6 HOURS
Refills: 0 | Status: DISCONTINUED | OUTPATIENT
Start: 2025-08-15 | End: 2025-08-16

## 2025-08-15 RX ORDER — AMLODIPINE BESYLATE 10 MG/1
2.5 TABLET ORAL DAILY
Refills: 0 | Status: DISCONTINUED | OUTPATIENT
Start: 2025-08-15 | End: 2025-08-16

## 2025-08-15 RX ORDER — TREPROSTINIL 20 MG/20ML
0 INJECTION, SOLUTION INTRAVENOUS; SUBCUTANEOUS
Refills: 0 | DISCHARGE

## 2025-08-15 RX ORDER — TIOTROPIUM BROMIDE INHALATION SPRAY 3.12 UG/1
2 SPRAY, METERED RESPIRATORY (INHALATION) DAILY
Refills: 0 | Status: DISCONTINUED | OUTPATIENT
Start: 2025-08-15 | End: 2025-08-16

## 2025-08-15 RX ADMIN — Medication 50 MILLIGRAM(S): at 02:35

## 2025-08-15 RX ADMIN — Medication 1000 MILLIGRAM(S): at 04:37

## 2025-08-15 RX ADMIN — Medication 1000 MILLIGRAM(S): at 22:35

## 2025-08-15 RX ADMIN — BUMETANIDE 1 MILLIGRAM(S): 1 TABLET ORAL at 13:16

## 2025-08-15 RX ADMIN — AMLODIPINE BESYLATE 2.5 MILLIGRAM(S): 10 TABLET ORAL at 13:16

## 2025-08-15 RX ADMIN — IPRATROPIUM BROMIDE AND ALBUTEROL SULFATE 3 MILLILITER(S): .5; 2.5 SOLUTION RESPIRATORY (INHALATION) at 23:43

## 2025-08-15 RX ADMIN — FLUTICASONE PROPIONATE 1 SPRAY(S): 50 SPRAY, METERED NASAL at 19:01

## 2025-08-15 RX ADMIN — Medication 30 MILLILITER(S): at 16:03

## 2025-08-15 RX ADMIN — Medication 10 MILLIGRAM(S): at 13:16

## 2025-08-15 RX ADMIN — Medication 20 MILLIGRAM(S): at 13:17

## 2025-08-15 RX ADMIN — Medication 25 MILLIGRAM(S): at 19:58

## 2025-08-15 RX ADMIN — IPRATROPIUM BROMIDE AND ALBUTEROL SULFATE 3 MILLILITER(S): .5; 2.5 SOLUTION RESPIRATORY (INHALATION) at 16:09

## 2025-08-15 RX ADMIN — DEXAMETHASONE 6 MILLIGRAM(S): 0.5 TABLET ORAL at 02:35

## 2025-08-15 RX ADMIN — Medication 20 MILLIGRAM(S): at 02:35

## 2025-08-15 RX ADMIN — Medication 1 DROP(S): at 20:12

## 2025-08-15 RX ADMIN — Medication 1 DROP(S): at 13:16

## 2025-08-15 RX ADMIN — Medication 400 MILLIGRAM(S): at 21:39

## 2025-08-15 RX ADMIN — Medication 400 MILLIGRAM(S): at 02:35

## 2025-08-16 ENCOUNTER — TRANSCRIPTION ENCOUNTER (OUTPATIENT)
Age: 45
End: 2025-08-16

## 2025-08-16 VITALS — DIASTOLIC BLOOD PRESSURE: 82 MMHG | SYSTOLIC BLOOD PRESSURE: 127 MMHG

## 2025-08-16 LAB
ADD ON TEST-SPECIMEN IN LAB: SIGNIFICANT CHANGE UP
ANION GAP SERPL CALC-SCNC: 14 MMOL/L — SIGNIFICANT CHANGE UP (ref 5–17)
BASOPHILS # BLD AUTO: 0.07 K/UL — SIGNIFICANT CHANGE UP (ref 0–0.2)
BASOPHILS NFR BLD AUTO: 0.6 % — SIGNIFICANT CHANGE UP (ref 0–2)
BUN SERPL-MCNC: 12 MG/DL — SIGNIFICANT CHANGE UP (ref 7–23)
CALCIUM SERPL-MCNC: 9.1 MG/DL — SIGNIFICANT CHANGE UP (ref 8.4–10.5)
CHLORIDE SERPL-SCNC: 102 MMOL/L — SIGNIFICANT CHANGE UP (ref 96–108)
CO2 SERPL-SCNC: 20 MMOL/L — LOW (ref 22–31)
CREAT SERPL-MCNC: 0.83 MG/DL — SIGNIFICANT CHANGE UP (ref 0.5–1.3)
EGFR: 89 ML/MIN/1.73M2 — SIGNIFICANT CHANGE UP
EGFR: 89 ML/MIN/1.73M2 — SIGNIFICANT CHANGE UP
EOSINOPHIL # BLD AUTO: 0 K/UL — SIGNIFICANT CHANGE UP (ref 0–0.5)
EOSINOPHIL NFR BLD AUTO: 0 % — SIGNIFICANT CHANGE UP (ref 0–6)
FLUAV AG NPH QL: SIGNIFICANT CHANGE UP
FLUBV AG NPH QL: SIGNIFICANT CHANGE UP
GLUCOSE SERPL-MCNC: 89 MG/DL — SIGNIFICANT CHANGE UP (ref 70–99)
HCT VFR BLD CALC: 40.9 % — SIGNIFICANT CHANGE UP (ref 34.5–45)
HCT VFR BLD CALC: 40.9 % — SIGNIFICANT CHANGE UP (ref 34.5–45)
HGB BLD-MCNC: 12.4 G/DL — SIGNIFICANT CHANGE UP (ref 11.5–15.5)
HGB BLD-MCNC: 12.4 G/DL — SIGNIFICANT CHANGE UP (ref 11.5–15.5)
IMM GRANULOCYTES # BLD AUTO: 0.09 K/UL — HIGH (ref 0–0.07)
IMM GRANULOCYTES NFR BLD AUTO: 0.8 % — SIGNIFICANT CHANGE UP (ref 0–0.9)
LYMPHOCYTES # BLD AUTO: 2.59 K/UL — SIGNIFICANT CHANGE UP (ref 1–3.3)
LYMPHOCYTES NFR BLD AUTO: 23 % — SIGNIFICANT CHANGE UP (ref 13–44)
MAGNESIUM SERPL-MCNC: 2 MG/DL — SIGNIFICANT CHANGE UP (ref 1.6–2.6)
MCHC RBC-ENTMCNC: 20.7 PG — LOW (ref 27–34)
MCHC RBC-ENTMCNC: 20.7 PG — LOW (ref 27–34)
MCHC RBC-ENTMCNC: 30.3 G/DL — LOW (ref 32–36)
MCHC RBC-ENTMCNC: 30.3 G/DL — LOW (ref 32–36)
MCV RBC AUTO: 68.3 FL — LOW (ref 80–100)
MCV RBC AUTO: 68.3 FL — LOW (ref 80–100)
MONOCYTES # BLD AUTO: 0.77 K/UL — SIGNIFICANT CHANGE UP (ref 0–0.9)
MONOCYTES NFR BLD AUTO: 6.8 % — SIGNIFICANT CHANGE UP (ref 2–14)
NEUTROPHILS # BLD AUTO: 7.74 K/UL — HIGH (ref 1.8–7.4)
NEUTROPHILS NFR BLD AUTO: 68.8 % — SIGNIFICANT CHANGE UP (ref 43–77)
NRBC # BLD AUTO: 0 K/UL — SIGNIFICANT CHANGE UP (ref 0–0)
NRBC # FLD: 0 K/UL — SIGNIFICANT CHANGE UP (ref 0–0)
NRBC BLD AUTO-RTO: 0 /100 WBCS — SIGNIFICANT CHANGE UP (ref 0–0)
NRBC BLD AUTO-RTO: 0 /100 WBCS — SIGNIFICANT CHANGE UP (ref 0–0)
PHOSPHATE SERPL-MCNC: 3.8 MG/DL — SIGNIFICANT CHANGE UP (ref 2.5–4.5)
PLATELET # BLD AUTO: 393 K/UL — SIGNIFICANT CHANGE UP (ref 150–400)
PLATELET # BLD AUTO: 393 K/UL — SIGNIFICANT CHANGE UP (ref 150–400)
PMV BLD: 10.2 FL — SIGNIFICANT CHANGE UP (ref 7–13)
POTASSIUM SERPL-MCNC: 3.6 MMOL/L — SIGNIFICANT CHANGE UP (ref 3.5–5.3)
POTASSIUM SERPL-SCNC: 3.6 MMOL/L — SIGNIFICANT CHANGE UP (ref 3.5–5.3)
RBC # BLD: 5.99 M/UL — HIGH (ref 3.8–5.2)
RBC # BLD: 5.99 M/UL — HIGH (ref 3.8–5.2)
RBC # FLD: 17.6 % — HIGH (ref 10.3–14.5)
RBC # FLD: 17.6 % — HIGH (ref 10.3–14.5)
RSV RNA NPH QL NAA+NON-PROBE: SIGNIFICANT CHANGE UP
SARS-COV-2 RNA SPEC QL NAA+PROBE: SIGNIFICANT CHANGE UP
SODIUM SERPL-SCNC: 136 MMOL/L — SIGNIFICANT CHANGE UP (ref 135–145)
SOURCE RESPIRATORY: SIGNIFICANT CHANGE UP
WBC # BLD: 10.92 K/UL — HIGH (ref 3.8–10.5)
WBC # BLD: 10.92 K/UL — HIGH (ref 3.8–10.5)
WBC # FLD AUTO: 10.92 K/UL — HIGH (ref 3.8–10.5)
WBC # FLD AUTO: 10.92 K/UL — HIGH (ref 3.8–10.5)

## 2025-08-16 PROCEDURE — 94640 AIRWAY INHALATION TREATMENT: CPT

## 2025-08-16 PROCEDURE — 36415 COLL VENOUS BLD VENIPUNCTURE: CPT

## 2025-08-16 PROCEDURE — 71045 X-RAY EXAM CHEST 1 VIEW: CPT

## 2025-08-16 PROCEDURE — 80048 BASIC METABOLIC PNL TOTAL CA: CPT

## 2025-08-16 PROCEDURE — 85025 COMPLETE CBC W/AUTO DIFF WBC: CPT

## 2025-08-16 PROCEDURE — 99285 EMERGENCY DEPT VISIT HI MDM: CPT

## 2025-08-16 PROCEDURE — 83735 ASSAY OF MAGNESIUM: CPT

## 2025-08-16 PROCEDURE — 71045 X-RAY EXAM CHEST 1 VIEW: CPT | Mod: 26

## 2025-08-16 PROCEDURE — 96374 THER/PROPH/DIAG INJ IV PUSH: CPT

## 2025-08-16 PROCEDURE — 99239 HOSP IP/OBS DSCHRG MGMT >30: CPT

## 2025-08-16 PROCEDURE — 96375 TX/PRO/DX INJ NEW DRUG ADDON: CPT

## 2025-08-16 PROCEDURE — 85027 COMPLETE CBC AUTOMATED: CPT

## 2025-08-16 PROCEDURE — 87637 SARSCOV2&INF A&B&RSV AMP PRB: CPT

## 2025-08-16 PROCEDURE — 84100 ASSAY OF PHOSPHORUS: CPT

## 2025-08-16 RX ORDER — AMLODIPINE BESYLATE 10 MG/1
1 TABLET ORAL
Qty: 30 | Refills: 0
Start: 2025-08-16 | End: 2025-09-14

## 2025-08-16 RX ORDER — IPRATROPIUM BROMIDE AND ALBUTEROL SULFATE .5; 2.5 MG/3ML; MG/3ML
3 SOLUTION RESPIRATORY (INHALATION) ONCE
Refills: 0 | Status: COMPLETED | OUTPATIENT
Start: 2025-08-16 | End: 2025-08-16

## 2025-08-16 RX ADMIN — Medication 1 DROP(S): at 11:25

## 2025-08-16 RX ADMIN — FLUTICASONE PROPIONATE 1 SPRAY(S): 50 SPRAY, METERED NASAL at 06:19

## 2025-08-16 RX ADMIN — IPRATROPIUM BROMIDE AND ALBUTEROL SULFATE 3 MILLILITER(S): .5; 2.5 SOLUTION RESPIRATORY (INHALATION) at 06:18

## 2025-08-16 RX ADMIN — AMLODIPINE BESYLATE 2.5 MILLIGRAM(S): 10 TABLET ORAL at 06:19

## 2025-08-16 RX ADMIN — IPRATROPIUM BROMIDE AND ALBUTEROL SULFATE 3 MILLILITER(S): .5; 2.5 SOLUTION RESPIRATORY (INHALATION) at 02:02

## 2025-08-16 RX ADMIN — Medication 10 MILLIGRAM(S): at 08:15

## 2025-08-16 RX ADMIN — Medication 20 MILLIGRAM(S): at 11:26

## 2025-08-16 RX ADMIN — IPRATROPIUM BROMIDE AND ALBUTEROL SULFATE 3 MILLILITER(S): .5; 2.5 SOLUTION RESPIRATORY (INHALATION) at 11:25

## 2025-08-18 ENCOUNTER — NON-APPOINTMENT (OUTPATIENT)
Age: 45
End: 2025-08-18

## 2025-08-18 ENCOUNTER — APPOINTMENT (OUTPATIENT)
Dept: PULMONOLOGY | Facility: CLINIC | Age: 45
End: 2025-08-18
Payer: MEDICAID

## 2025-08-18 VITALS
HEART RATE: 106 BPM | BODY MASS INDEX: 36.82 KG/M2 | DIASTOLIC BLOOD PRESSURE: 75 MMHG | HEIGHT: 65 IN | WEIGHT: 221 LBS | RESPIRATION RATE: 17 BRPM | SYSTOLIC BLOOD PRESSURE: 101 MMHG | OXYGEN SATURATION: 96 % | TEMPERATURE: 97.9 F

## 2025-08-18 DIAGNOSIS — Z01.818 ENCOUNTER FOR OTHER PREPROCEDURAL EXAMINATION: ICD-10-CM

## 2025-08-18 PROCEDURE — ZZZZZ: CPT

## 2025-08-18 PROCEDURE — 99215 OFFICE O/P EST HI 40 MIN: CPT | Mod: 25

## 2025-08-18 PROCEDURE — 94726 PLETHYSMOGRAPHY LUNG VOLUMES: CPT

## 2025-08-18 PROCEDURE — 94729 DIFFUSING CAPACITY: CPT

## 2025-08-18 PROCEDURE — 94010 BREATHING CAPACITY TEST: CPT

## 2025-08-20 ENCOUNTER — APPOINTMENT (OUTPATIENT)
Dept: PEDIATRIC ALLERGY IMMUNOLOGY | Facility: CLINIC | Age: 45
End: 2025-08-20

## 2025-08-25 ENCOUNTER — APPOINTMENT (OUTPATIENT)
Dept: INTERNAL MEDICINE | Facility: CLINIC | Age: 45
End: 2025-08-25
Payer: MEDICAID

## 2025-08-25 ENCOUNTER — OUTPATIENT (OUTPATIENT)
Dept: OUTPATIENT SERVICES | Facility: HOSPITAL | Age: 45
LOS: 1 days | End: 2025-08-25
Payer: MEDICAID

## 2025-08-25 ENCOUNTER — APPOINTMENT (OUTPATIENT)
Dept: PEDIATRIC ALLERGY IMMUNOLOGY | Facility: CLINIC | Age: 45
End: 2025-08-25
Payer: MEDICAID

## 2025-08-25 ENCOUNTER — LABORATORY RESULT (OUTPATIENT)
Age: 45
End: 2025-08-25

## 2025-08-25 VITALS
SYSTOLIC BLOOD PRESSURE: 127 MMHG | HEART RATE: 86 BPM | OXYGEN SATURATION: 94 % | HEIGHT: 65 IN | BODY MASS INDEX: 36.99 KG/M2 | DIASTOLIC BLOOD PRESSURE: 88 MMHG | WEIGHT: 222 LBS

## 2025-08-25 VITALS
HEART RATE: 100 BPM | DIASTOLIC BLOOD PRESSURE: 70 MMHG | HEIGHT: 65 IN | BODY MASS INDEX: 36.99 KG/M2 | WEIGHT: 222 LBS | SYSTOLIC BLOOD PRESSURE: 110 MMHG | OXYGEN SATURATION: 95 %

## 2025-08-25 DIAGNOSIS — R76.8 OTHER SPECIFIED ABNORMAL IMMUNOLOGICAL FINDINGS IN SERUM: ICD-10-CM

## 2025-08-25 DIAGNOSIS — T78.3XXA ANGIONEUROTIC EDEMA, INITIAL ENCOUNTER: ICD-10-CM

## 2025-08-25 DIAGNOSIS — E66.812 OBESITY, CLASS 2: ICD-10-CM

## 2025-08-25 DIAGNOSIS — H10.13 ACUTE ATOPIC CONJUNCTIVITIS, BILATERAL: ICD-10-CM

## 2025-08-25 DIAGNOSIS — E66.01 OBESITY, CLASS 2: ICD-10-CM

## 2025-08-25 DIAGNOSIS — I10 ESSENTIAL (PRIMARY) HYPERTENSION: ICD-10-CM

## 2025-08-25 DIAGNOSIS — T78.3XXD ANGIONEUROTIC EDEMA, SUBSEQUENT ENCOUNTER: ICD-10-CM

## 2025-08-25 DIAGNOSIS — J84.9 INTERSTITIAL PULMONARY DISEASE, UNSPECIFIED: ICD-10-CM

## 2025-08-25 PROCEDURE — G0463: CPT

## 2025-08-25 PROCEDURE — G2211 COMPLEX E/M VISIT ADD ON: CPT | Mod: NC

## 2025-08-25 PROCEDURE — 99215 OFFICE O/P EST HI 40 MIN: CPT | Mod: 25

## 2025-08-25 PROCEDURE — 99214 OFFICE O/P EST MOD 30 MIN: CPT | Mod: GC

## 2025-08-25 RX ORDER — CETIRIZINE HYDROCHLORIDE ORAL SOLUTION 1 MG/ML
1 SOLUTION ORAL
Qty: 600 | Refills: 2 | Status: ACTIVE | COMMUNITY
Start: 2025-08-25 | End: 1900-01-01

## 2025-08-25 RX ORDER — FAMOTIDINE 20 MG/1
20 TABLET, FILM COATED ORAL
Qty: 2 | Refills: 2 | Status: ACTIVE | COMMUNITY
Start: 2025-08-25 | End: 1900-01-01

## 2025-08-25 RX ORDER — MONTELUKAST 10 MG/1
10 TABLET, FILM COATED ORAL
Qty: 30 | Refills: 1 | Status: ACTIVE | COMMUNITY
Start: 2025-08-25 | End: 1900-01-01

## 2025-08-26 LAB
C4 SERPL-MCNC: 38 MG/DL
TOTAL IGE SMQN RAST: 1594 KU/L

## 2025-08-28 LAB
A FUMIGATUS IGE QN: <0.1 KUA/L
C HERBARUM IGE QN: <0.1 KUA/L
CEDAR IGE QN: <0.1 KUA/L
CMN PIGWEED IGE QN: <0.1 KUA/L
COCKLEBUR IGE QN: <0.1 KUA/L
D PTERONYSS IGE QN: 0.11 KUA/L
DEPRECATED A FUMIGATUS IGE RAST QL: 0
DEPRECATED C HERBARUM IGE RAST QL: 0
DEPRECATED CEDAR IGE RAST QL: 0
DEPRECATED COCKLEBUR IGE RAST QL: 0
DEPRECATED COMMON PIGWEED IGE RAST QL: 0
DEPRECATED D PTERONYSS IGE RAST QL: NORMAL
DEPRECATED DOG DANDER IGE RAST QL: 0
DEPRECATED F MONILIFORME IGE RAST QL: 0
DEPRECATED KENT BLUE GRASS IGE RAST QL: NORMAL
DEPRECATED LONDON PLANE IGE RAST QL: 0
DEPRECATED P NOTATUM IGE RAST QL: 0
DEPRECATED ROACH IGE RAST QL: 0
DEPRECATED WHITE ELM IGE RAST QL: NORMAL
DEPRECATED WHITE HICKORY IGE RAST QL: 0
DOG DANDER IGE QN: <0.1 KUA/L
F MONILIFORME IGE QN: <0.1 KUA/L
KENT BLUE GRASS IGE QN: 0.14 KUA/L
LONDON PLANE IGE QN: <0.1 KUA/L
P NOTATUM IGE QN: <0.1 KUA/L
ROACH IGE QN: <0.1 KUA/L
WHITE ELM IGE QN: 0.13 KUA/L
WHITE HICKORY IGE QN: <0.1 KUA/L

## 2025-08-31 LAB
A ALTERNATA IGE QN: <0.1 KUA/L
AMER BEECH IGE QN: 0
BOXELDER IGE QN: <0.1 KUA/L
C LUNATA IGE QN: <0.1 KUA/L
CAT DANDER IGE QN: <0.1 KUA/L
COMMON RAGWEED IGE QN: <0.1 KUA/L
D FARINAE IGE QN: 0.11 KUA/L
DEPRECATED A ALTERNATA IGE RAST QL: 0
DEPRECATED A PULLULANS IGE RAST QL: 0
DEPRECATED AMER BEECH IGE RAST QL: <0.1 KUA/L
DEPRECATED BOXELDER IGE RAST QL: 0
DEPRECATED C LUNATA IGE RAST QL: 0
DEPRECATED CAT DANDER IGE RAST QL: 0
DEPRECATED COMMON RAGWEED IGE RAST QL: 0
DEPRECATED D FARINAE IGE RAST QL: NORMAL
DEPRECATED GOOSE FEATHER IGE RAST QL: 0
DEPRECATED GOOSEFOOT IGE RAST QL: 0
DEPRECATED M RACEMOSUS IGE RAST QL: 0
DEPRECATED MUGWORT IGE RAST QL: 0
DEPRECATED R NIGRICANS IGE RAST QL: 0
DEPRECATED SILVER BIRCH IGE RAST QL: 0
DEPRECATED TIMOTHY IGE RAST QL: 0
DEPRECATED WHITE MULBERRY IGE RAST QL: 0
GOOSE FEATHER IGE QN: <0.1 KUA/L
GOOSEFOOT IGE QN: <0.1 KUA/L
Lab: <0.1 KUA/L
M RACEMOSUS IGE QN: <0.1 KUA/L
MUGWORT IGE QN: <0.1 KUA/L
R NIGRICANS IGE QN: <0.1 KUA/L
SILVER BIRCH IGE QN: <0.1 KUA/L
TIMOTHY IGE QN: <0.1 KUA/L
WHITE MULBERRY IGE QN: <0.1 KUA/L

## 2025-09-02 LAB
DEPRECATED WHITE ASH IGE RAST QL: 0
DEPRECATED WHITE OAK IGE RAST QL: 0
WHITE ASH IGE QN: <0.1 KUA/L
WHITE OAK IGE QN: <0.1 KUA/L

## 2025-09-03 DIAGNOSIS — E66.812 OBESITY, CLASS 2: ICD-10-CM

## 2025-09-03 DIAGNOSIS — T78.3XXA ANGIONEUROTIC EDEMA, INITIAL ENCOUNTER: ICD-10-CM

## 2025-09-03 DIAGNOSIS — E66.01 MORBID (SEVERE) OBESITY DUE TO EXCESS CALORIES: ICD-10-CM

## 2025-09-04 ENCOUNTER — NON-APPOINTMENT (OUTPATIENT)
Age: 45
End: 2025-09-04

## 2025-09-04 LAB — C1Q SERPL-MCNC: 15.1 MG/DL

## 2025-09-05 ENCOUNTER — APPOINTMENT (OUTPATIENT)
Dept: PEDIATRIC ALLERGY IMMUNOLOGY | Facility: CLINIC | Age: 45
End: 2025-09-05

## 2025-09-05 LAB — CHRONIC URTICARIA PANEL (CU INDEX): 7

## 2025-09-10 ENCOUNTER — APPOINTMENT (OUTPATIENT)
Dept: SLEEP CENTER | Facility: CLINIC | Age: 45
End: 2025-09-10

## 2025-09-18 ENCOUNTER — NON-APPOINTMENT (OUTPATIENT)
Age: 45
End: 2025-09-18

## 2025-09-19 ENCOUNTER — APPOINTMENT (OUTPATIENT)
Dept: PULMONOLOGY | Facility: CLINIC | Age: 45
End: 2025-09-19

## 2025-09-19 RX ORDER — TREPROSTINIL 20 MG/20ML
1 INJECTION, SOLUTION INTRAVENOUS; SUBCUTANEOUS
Refills: 0 | DISCHARGE

## 2025-09-19 RX ORDER — SOTATERCEPT-CSRK 90 MG
0.7 KIT SUBCUTANEOUS
Refills: 0 | DISCHARGE

## (undated) DEVICE — DRAPE MAYO STAND 30"

## (undated) DEVICE — LAP PAD 18 X 18"

## (undated) DEVICE — SUT PLEDGET PRE PUNCH 4.8 X 9.5 X 1.5 MM

## (undated) DEVICE — SUT SURGICAL STEEL 6 30" BP-1

## (undated) DEVICE — CATH IV SAFE BC 20G X 1.16" (PINK)

## (undated) DEVICE — DRSG DERMABOND PRINEO 60CM

## (undated) DEVICE — SUT SOFSILK 0 18" TIES

## (undated) DEVICE — TUBING SUCTION 20FT

## (undated) DEVICE — WRENCH TORQUE BIDIRECTIONAL DISP

## (undated) DEVICE — PACK CARDIAC MINOR

## (undated) DEVICE — DRSG CURITY GAUZE SPONGE 4 X 4" 12-PLY

## (undated) DEVICE — BALLOON US ENDO

## (undated) DEVICE — ELCTR BOVIE TIP CLEANER SCRATCH PAD

## (undated) DEVICE — VISITEC 4X4

## (undated) DEVICE — WARMING BLANKET DUO-THERM HYPER/HYPOTHERM ADULT

## (undated) DEVICE — SYR LUER LOK 10CC

## (undated) DEVICE — SUT POLYSORB 2-0 30" GS-21 UNDYED

## (undated) DEVICE — SUT SOFSILK 0 30" V-20

## (undated) DEVICE — ULTRASOUND TRANSDUCER COVER

## (undated) DEVICE — NDL HYPO SAFE 25G X 5/8" (ORANGE)

## (undated) DEVICE — CATH IV SAFE BC 22G X 1" (BLUE)

## (undated) DEVICE — DRSG OPSITE 13.75 X 4"

## (undated) DEVICE — STAPLER SKIN VISI-STAT 35 WIDE

## (undated) DEVICE — POSITIONER FOAM EGG CRATE ULNAR 2PCS (PINK)

## (undated) DEVICE — SOL INJ NS 0.9% 500ML 2 PORT

## (undated) DEVICE — SUT SILK 5-0 60" TIES

## (undated) DEVICE — SUT VICRYL 3-0 27" CT-1

## (undated) DEVICE — SUT BLUNT SZ 5

## (undated) DEVICE — DRSG TEGADERM 2.5 X 3"

## (undated) DEVICE — DRAPE INSTRUMENT POUCH 6.75" X 11"

## (undated) DEVICE — SAW BLADE STRYKER STERNUM 31MM X 6.27 X .79

## (undated) DEVICE — WARMING BLANKET FULL UNDERBODY

## (undated) DEVICE — SPECIMEN CONTAINER 100ML

## (undated) DEVICE — SYR LUER LOK 20CC

## (undated) DEVICE — PACK UNIVERSAL CARDIAC

## (undated) DEVICE — DRAPE 1/2 SHEET 40X57"

## (undated) DEVICE — DRAPE TOWEL BLUE 17" X 24"

## (undated) DEVICE — BLADE SCALPEL SAFETYLOCK #11

## (undated) DEVICE — SUCTION YANKAUER NO CONTROL VENT

## (undated) DEVICE — SPECIMEN CONTAINER 4OZ

## (undated) DEVICE — GLV 7.5 PROTEXIS (BLUE)

## (undated) DEVICE — DRAPE IOBAN 23" X 23"

## (undated) DEVICE — SYR IV FLUSH SALINE 10ML 30/TY

## (undated) DEVICE — MEDICATION LABELS W MARKER

## (undated) DEVICE — FOLEY TRAY 16FR 5CC LF LUBRISIL ADVANCE TEMP CLOSED

## (undated) DEVICE — SYR ALLIANCE II INFLATION 60ML

## (undated) DEVICE — STEALTH CLAMP INSERT FIBRA/FIBRA 60MM

## (undated) DEVICE — BLADE SCALPEL SAFETYLOCK #15

## (undated) DEVICE — SUT POLYSORB 0 36" GS-25 UNDYED

## (undated) DEVICE — SUT STAINLESS STEEL 5 18" SCC

## (undated) DEVICE — ELCTR DEFIB PAD PRO-PADZ W 10FT LEAD WIRES ADULT

## (undated) DEVICE — DRAPE 3/4 SHEET W REINFORCEMENT 56X77"

## (undated) DEVICE — DRSG MASTISOL

## (undated) DEVICE — SYR ASEPTO

## (undated) DEVICE — BITE BLOCK ADULT 20 X 27MM (GREEN)

## (undated) DEVICE — SUT DOUBLE 6 WIRE STERNAL

## (undated) DEVICE — SENSOR O2 FINGER ADULT

## (undated) DEVICE — SUT ETHIBOND 0 44" EN3

## (undated) DEVICE — CATH BLLN ULTRASONIC ENSOSCOPE

## (undated) DEVICE — NDL HYPO SAFE 22G X 1.5" (BLACK)

## (undated) DEVICE — SUT SILK 0 30" CT-1

## (undated) DEVICE — IRRIGATOR BIO SHIELD

## (undated) DEVICE — TUBING CONTRAST INJECTION HIGH PRESSURE 1200PSI 72"

## (undated) DEVICE — PREP CHLORAPREP HI-LITE ORANGE 26ML

## (undated) DEVICE — SOL NORMOSOL-R PH7.4 1000ML

## (undated) DEVICE — DRAPE CAMERA HANDLE COVER

## (undated) DEVICE — TUBING IV SET GRAVITY 3Y 100" MACRO

## (undated) DEVICE — SUT BIOSYN 4-0 18" P-12

## (undated) DEVICE — FORCEP RADIAL JAW 4 JUMBO 2.8MM 3.2MM 240CM ORANGE DISP

## (undated) DEVICE — SUT SOFSILK 2-0 18" TIES

## (undated) DEVICE — PACK IV START WITH CHG

## (undated) DEVICE — FIXATION TOOL

## (undated) DEVICE — NDL HYPO REGULAR BEVEL 22G X 1.5" (TURQUOISE)

## (undated) DEVICE — ELCTR BOVIE PENCIL HANDPIECE ROCKER SWITCH 15FT

## (undated) DEVICE — SOL IRR POUR H2O 250ML

## (undated) DEVICE — SOL IRR POUR NS 0.9% 500ML

## (undated) DEVICE — STEALTH CLAMP INSERT FIBRA/FIBRA 90MM

## (undated) DEVICE — TUBE VENOUS BLOOD COLLECTION LIGHT GREEN TOP

## (undated) DEVICE — VENODYNE/SCD SLEEVE CALF MEDIUM

## (undated) DEVICE — BIOPSY FORCEP RADIAL JAW 4 STANDARD WITH NEEDLE

## (undated) DEVICE — BLADE SCALPEL SAFETYLOCK #10

## (undated) DEVICE — FOLEY HOLDER STATLOCK 2 WAY ADULT

## (undated) DEVICE — TUBING SUCTION CONN 6FT STERILE

## (undated) DEVICE — DRAPE C ARM UNIVERSAL

## (undated) DEVICE — SUT MONOCRYL 4-0 18" PS-2

## (undated) DEVICE — SUT VICRYL 2-0 27" CT-1 UNDYED